# Patient Record
Sex: FEMALE | Race: WHITE
[De-identification: names, ages, dates, MRNs, and addresses within clinical notes are randomized per-mention and may not be internally consistent; named-entity substitution may affect disease eponyms.]

---

## 2017-01-11 ENCOUNTER — HOSPITAL ENCOUNTER (OUTPATIENT)
Dept: HOSPITAL 84 - D.FANS | Age: 57
Discharge: HOME | End: 2017-01-11
Attending: SURGERY
Payer: MEDICARE

## 2017-01-11 VITALS — BODY MASS INDEX: 45.7 KG/M2

## 2017-01-11 DIAGNOSIS — Z01.818: Primary | ICD-10-CM

## 2017-01-11 NOTE — NUR
Nutrition education for bariatric surgery (gastric bypass):
S: Pt unable to walk to office for education; requested a wheelchair.  Pt
states her blood sugar was low and asked me to buy her a coke.  Pt did provide
the money.  Pt reports she drinks regular Sprite, some water and SF drinks.
Pt states she skips meals and does not eat very much when she does eat.
Pt states she is unable to exercise due to leg pain.
 
O:  Dx:  morbid obesity, DMT2
    Ht: 5'8"   Wt: 308#   IBW: 140# +/-10%   BMI: 46.8
    PMH:  DMT2, DJD with knee replacement
 
A: Pt in denial re: how much soda she is drinking in a day;  Pt is also in
denial re: how much food she is consuming daily.  Pt food recall for last 24
hours reveals pt is only eating ~1200 kcal/day; however, pt reports she
continues to gain wt.  RDLOIS was standing behind pt in the dining room and she
ordered Lasagna, mashed potatoes, fried zucchini, gravy, large Dr. Pepper for
lunch.  Pt did not see me behind her.  Pt did not even attempt to practice and
of the homework she had just learned to prepare herself for bariatric surgery.
 
P: Reviewed pre/post op dietary control; clear liquid/full
liquid/puree/soft/regular diet progression and reviewed sample menus;
discussed no liquids with meals; 1/2 cup meal size; dumping syndrome; no
straws or carbonated drinks; protein requirements; clear liquid protein drinks
and protein powder, premixed protein supplements; reviewed sample menus; daily
multivitamin; pouch stretching and provided pt with printed diet information
and RDN name and phone number.
OSWALDON feels pt is not ready to make the necessary changes to be successful for
the long-term.
RDN will be available if needed.
Thank you for the consult.

## 2017-03-22 ENCOUNTER — HOSPITAL ENCOUNTER (OUTPATIENT)
Dept: HOSPITAL 84 - D.RT | Age: 57
Discharge: HOME | End: 2017-03-22
Attending: INTERNAL MEDICINE
Payer: MEDICARE

## 2017-03-22 VITALS — BODY MASS INDEX: 45.7 KG/M2

## 2017-03-22 DIAGNOSIS — R06.09: Primary | ICD-10-CM

## 2017-10-24 ENCOUNTER — HOSPITAL ENCOUNTER (OUTPATIENT)
Dept: HOSPITAL 84 - D.CATH | Age: 57
Discharge: HOME | End: 2017-10-24
Attending: INTERNAL MEDICINE
Payer: MEDICARE

## 2017-10-24 VITALS — DIASTOLIC BLOOD PRESSURE: 54 MMHG | SYSTOLIC BLOOD PRESSURE: 130 MMHG

## 2017-10-24 VITALS — BODY MASS INDEX: 47.5 KG/M2

## 2017-10-24 DIAGNOSIS — I10: ICD-10-CM

## 2017-10-24 DIAGNOSIS — E78.5: ICD-10-CM

## 2017-10-24 DIAGNOSIS — Z01.812: ICD-10-CM

## 2017-10-24 DIAGNOSIS — I25.119: Primary | ICD-10-CM

## 2017-10-24 LAB
ANION GAP SERPL CALC-SCNC: 14.5 MMOL/L (ref 8–16)
BASOPHILS NFR BLD AUTO: 0.4 % (ref 0–2)
BUN SERPL-MCNC: 34 MG/DL (ref 7–18)
CALCIUM SERPL-MCNC: 8.9 MG/DL (ref 8.5–10.1)
CHLORIDE SERPL-SCNC: 99 MMOL/L (ref 98–107)
CO2 SERPL-SCNC: 26.8 MMOL/L (ref 21–32)
CREAT SERPL-MCNC: 1.5 MG/DL (ref 0.6–1.3)
EOSINOPHIL NFR BLD: 5.8 % (ref 0–7)
ERYTHROCYTE [DISTWIDTH] IN BLOOD BY AUTOMATED COUNT: 13.1 % (ref 11.5–14.5)
GLUCOSE SERPL-MCNC: 316 MG/DL (ref 74–106)
HCT VFR BLD CALC: 35 % (ref 36–48)
HGB BLD-MCNC: 11.8 G/DL (ref 12–16)
IMM GRANULOCYTES NFR BLD: 0.4 % (ref 0–5)
LYMPHOCYTES NFR BLD AUTO: 24.6 % (ref 15–50)
MCH RBC QN AUTO: 32.7 PG (ref 26–34)
MCHC RBC AUTO-ENTMCNC: 33.7 G/DL (ref 31–37)
MCV RBC: 97 FL (ref 80–100)
MONOCYTES NFR BLD: 8.3 % (ref 2–11)
NEUTROPHILS NFR BLD AUTO: 60.5 % (ref 40–80)
OSMOLALITY SERPL CALC.SUM OF ELEC: 291 MOSM/KG (ref 275–300)
PLATELET # BLD: 222 10X3/UL (ref 130–400)
PMV BLD AUTO: 9.3 FL (ref 7.4–10.4)
POTASSIUM SERPL-SCNC: 4.3 MMOL/L (ref 3.5–5.1)
RBC # BLD AUTO: 3.61 10X6/UL (ref 4–5.4)
SODIUM SERPL-SCNC: 136 MMOL/L (ref 136–145)
WBC # BLD AUTO: 8 10X3/UL (ref 4.8–10.8)

## 2017-10-24 PROCEDURE — 93458 L HRT ARTERY/VENTRICLE ANGIO: CPT

## 2017-10-24 NOTE — NUR
2L NC, NO RESP DISTRESS. RIGHT WRIST TR BAND CDI, NO BLEEDING OR
HEMATOMA NOTED. NO C/O CHEST PAIN OR NAUSEA. VSS. FAMILY AT BEDSIDE,
CALL LIGHT WITHIN REACH.

## 2017-10-24 NOTE — NUR
LEFT HAND PIV D/C'D WITH CATHETER INTACT, BAND AID TO SITE. 3CC OF
AIR REMOVED FROM TR BAND, NO BLEEDING NOTED. UP TO BEDSIDE TO GET
DRESSED.

## 2017-10-24 NOTE — HEMODYNAMI
PATIENT:MIREYA BENOIT                               MEDICAL RECORD: [id number]
: 64                                            LOCATION:D.CAT          
ACCT# [id number]                                       ADMISSION DATE: 10/24/17
 
 
 Generatedon:10/24/660624:17
Patient name: MIREYA BENOIT Patient #: [id number] Visit #: [id number] SSN: [id number]
- :
1960 Date of study: 10/24/2017
Page: Of
Hemodynamic Procedure Report
****************************
Patient Data
Patient Demographics
Procedure consent was obtained
First Name: MIREYA          Gender: Female
Last Name: KAYCEE           : 1960
Middle Initial: ASHOK        Age: 57 year(s)
Patient #: [id number]       Race: 
Visit #: [id number]
SSN: 
Accession #:
[id number]
Additional ID: [id number]
Contact details
Address: [address] [place] [address]       Phone: [phone]
State: [place]
City: [place]
Zip code: [id number]
Past Medical History
Allergies
Allergen        Reaction        Date         Comments
Reported
Codeine                         2016
Other allergy                   2016    TYLENOL
Admission
Admission Data
Admission Date: 10/24/2017  Admission Time: 9:31
Arrival Date: 10/24/2017    Arrival Time: 11:30
Admit Source: Other         Insurance Payor: Medicare
Height (in.): 68            BSA: 2.47 (m2)
Height (cm.): 172.72        BMI: 47.44 (kg/m2)
Weight (lbs.): 312
Weight (kg.): 141.52
Lab Results
Lab Result Date: 10/24/2017 Lab Result Time: 0:00
Biochemistry
Name         Units    Result                Min      Max
BUN          mg/dl    34       --(----)-*   7        18
Creatinine   mg/dl    1.5      --(----)-*   0.6      1.3
CBC
Name         Units    Result                Min      Max
Hemoglobin   g/dl     11.8     *-(----)--   13.5     17.5
Procedure
Procedure Types
Cath Procedure
Diagnostic Procedure
LHC
LHC w/Coronaries
 
PCI Procedure
Coronary Stent Initial
Miscellaneous Procedures
Moderate Sedation up to 15 minutes
Procedure Description
Procedure Date
Procedure Date: 10/24/2017
Procedure Start Time: 13:01
Procedure End Time: 13:15
Procedure Staff
Name                            Function
Nakul Horowitz MD                Performing Physician
Kelly Walls RT               Scrub
Giovani Lewis RN                Nurse
Kelly Pearson RT                    Monitor
Procedure Data
Cath Procedure
Fluoroscopy
Diagnostic fluoroscopy      Total fluoroscopy Time: 2.9
time: 2.9 min               min
Diagnostic fluoroscopy      Total fluoroscopy dose:
dose: 1161 mGy              1161 mGy
Contrast Material
Contrast Material Type                       Amount (ml)
Isovue 300                                   139
Entry Location
Entry     Primary  Successful  Side  Size  Upsize Upsize Entry    Closure     Henao
ccessful  Closure
Location                             (Fr)  1 (Fr) 2 (Fr) Remarks  Device        
          Remarks
Radial                         Right 6 Fr                         Mechanical
artery                               Short                        Compression
Estimated blood loss: 5 ml
Diagnostic catheters
Device Type               Used For           End Catheter
Placement
Diagnostic Terumo 5Fr     Multi-vessel
Tiger 110cm catheter      Angiography
Procedure Complications
No complications
Procedure Medications
Medication           Administration Route Dosage
Oxygen               NC                   2 l/min
Heparin Flush Bag    added to field       2 bags
(1000units/500ml NS)
0.9% NaCl            I.V.                 100 ml/hr
Radial Cocktail      added to field       1 syringe
(Verapomil 2mg/Nitro
400mcg/Heparin
1500units)
Fentanyl             I.V.                 50 mcg
Versed               I.V.                 1 mg
Fentanyl             I.V.                 50 mcg
Versed               I.V.                 1 mg
Fentanyl             I.V.                 50 mcg
Radial Cocktail      I.A.                 1 syringe
(Verapomil 2mg/Nitro
400mcg/Heparin
1500units)
Heparin Bolus        I.V.                 4000 units
 
Hemodynamics
Rest
BSA: 2.47 (m2) HGB: 11.8 (g/dl) O2 Consumption: Estimated: 240.03 (ml/min) O2 Co
nsumption indexed:
Estimated:97.18 (ml/min/m) Heart Rate: 72 (bpm)
Pressure Samples
Time     Site     Value (mmHg) Purpose      Heart      Use
Rate(bpm)
13:03    LV       75/2,17      Snapshot     52
Snapshots
Pre Cath      Intra         NCS           Post Cath
Vital Signs
Time     Heart  Resp   SPO2 etCO2   NIBP (mmHg) Rhythm  Pain    Sedation
Rate   (ipm)  (%)  (mmHg)                      Status  Level
(bpm)
12:44:00 71     18     97   0       128/68(93)  NSR     0 (11)  10(A)
, No
pain
12:48:40 70     19     97   46.6    129/68(107) NSR     0 (11)  10(A)
, No
pain
12:53:21 69     18     95   39.1    122/67(90)  NSR     0 (11)  10(A)
, No
pain
12:58:06 71     18     94   40.6    112/54(80)  NSR     0 (11)  10(A)
, No
pain
13:02:38 71     18     94   42.1    114/66(91)  NSR     0 (11)  10(A)
, No
pain
13:07:16 70     18     95   30.8    107/59(77)  NSR     0 (11)  10(A)
, No
pain
13:12:03 80     18     96   22.5    121/42(71)  NSR     0 (11)  10(A)
, No
pain
13:16:58 70     18     95   33.8    111/48(80)  NSR     0 (11)  10(A)
, No
pain
Medications
Time     Medication       Route  Dose    Verified Delivered Reason          Note
s  Effectiveness
by       by
12:47:24 Oxygen           NC     2 l/min Nakul Matute    Per physician
Lor Lewis RN
12:47:33 Heparin Flush    added  2 bags  Nakul Matute    used for
Bag              to             Lor Lewis RN procedure
(1000units/500ml field
NS)
12:47:44 0.9% NaCl        I.V.   100     Nakul Matute    Per physician
ml/hr   Lor Lewis RN
12:47:51 Radial Cocktail  added  1       Nakul Matute    used for
(Verapomil       to     syringe Lor Lewis RN procedure
2mg/Nitro        field
400mcg/Heparin
1500units)
12:55:19 Fentanyl         I.V.   50 mcg  Nakul Matute    for sedation
Lor Lewis RN
12:55:25 Versed           I.V.   1 mg    Nakul Matute    for sedation
Lor Lewis RN
 
12:56:53 Fentanyl         I.V.   50 mcg  Nakul Matute    for sedation
Lor Lewis RN
12:56:59 Versed           I.V.   1 mg    Nakul Matute    for sedation
Lor Lewis RN
13:03:01 Fentanyl         I.V.   50 mcg  Nakul Crumy    for sedation
Lor Lewis RN
13:03:14 Radial Cocktail  I.A.   1       Nakul Mota   for
(Verapomil              syringe Lor Horowitz MD  vasodilation
2mg/Nitro
400mcg/Heparin
1500units)
13:07:43 Heparin Bolus    I.V.   4000    Nakul Matute    for
units   Lor Lewis RN anticoagulation
Procedure Log
Time     Note
12:25:00 Giovani Lewis RN sent for patient. Start room use.
12:31:18 Informed consent obtained and on chart
12::31 Diagnostic Cath Status : Elective
12:32:01 Time tracking: Regular hours
12:32:06 Plan of Care:Hemodynamics will remain stable., Cardiac
rhythm will remain stable., Comfort level will be
maintained., Respiratory function will remain
adequate., Patient/ family verbilizes understanding of
procedure., Procedure tolerated without complication.,
Recovers from procedure without complications..
12:37:32 Patient received from Pre/Post Procedure Room to [facility] 2
Alert and oriented. Tansferred to table in Supine
position.
12:37:33 Warm blankets applied, and anne marie hugger turned on for
patient comfort.
12:37:34 Correct patient and procedure confirmed by team.
12:37:35 ECG and BP/O2 sat monitors applied to patient.
12:42:17 Vital chart was started
12:47:24 Oxygen 2 l/min NC was administered by Giovani Lewis
RN; Per physician;
12:47:33 Heparin Flush Bag (1000units/500ml NS) 2 bags added to
field was administered by Giovani Lewis RN; used for
procedure;
12:47:33 Baseline sample Acquired.
12:47:36 Rhythm: sinus rhythm
12:47:38 Full Disclosure recording started
12:47:43 H&P Date Dictated: 10/24/2017 Within 30 days and on
chart., H&P Addendum completed by physician on day of
procedure. (MUST COMPLETE FOR ALL OUTPATIENTS).
12:47:44 0.9% NaCl 100 ml/hr I.V. was administered by Giovani Lewis RN; Per physician;
12:47:45 Pre-procedure instructions explained to patient.
12:47:45 Pre-op teaching completed and patient verbalized
understanding.
12:47:46 Family in waiting room.
12:47:48 Patient NPO since Midnight.
12:47:51 Radial Cocktail (Verapomil 2mg/Nitro 400mcg/Heparin
1500units) 1 syringe added to field was administered
by Giovani Lewis RN; used for procedure;
12:47:51 Is the patient allergic to Iodine/contrast media? No.
12:47:52 Was the patient premedicated? No
12:47:55 Is patient on blood thinner?Yes
12:48:04 **ACC** The patient was administered the following
blood thiners within the last 24 hours: **ACC**Plavix
12:48:25 Patient diabetic? Yes.
 
12:48:26 If diabetic: On Metformin? No
12:48:28 Previous problem with sedation/anesthesia? No ?
12:48:30 Snore? Yes
12:48:31 Sleep apnea? Yes
12:48:31 Deviated septum? No
12:48:32 Opens mouth fully? Yes
12:48:33 Sticks out tongue? Yes
12:48:52 Airway obstruction? No ?
12:48:56 Dentures? No ?
12:48:59 Pre procedure: right dorsailis pedis pulse 1+
Palpable, but thready & weak; easily obliterated
12:49:02 Pre procedure: left dorsailis pedis pulse 1+ Palpable,
but thready & weak; easily obliterated
12:49:08 Patient pain scale 0/10 ?.
12:49:22 IV patent on arrival in left forearm with 0.9% NaCl at
[facility].
12:50:03 Lab Result : BUN 34 mg/dl
12:50:03 Lab Result : Creatinine 1.5 mg/dl
12:50:03 Lab Result : Hemoglobin 11.8 g/dl
12:50:06 Lab results completed and on chart.
12:50:10 Right Radial & Right Groin area was prepped with
chlora-prep and draped in sterile fashion
12:50:12 Alarms reviewed by R. N.
12:50:12 Sharps counted by scrub and verified by R.N.
12:50:28 Physician arrived
12:50:29 --------ALL STOP TIME OUT------
12:50:29 Final Timeout: patient, procedure, and site verified
with staff and physician. All members of the team are
in agreement.
12:50:32 Right Radial & Right Groin site verified by team.
12:50:34 Physical assessment completed. ASA score P 2 - A
patient with mild systemic disease as per Nakul Horowitz MD.
12:50:37 Sedation plan: IV Moderate Sedation Versed, Fentanyl
12:50:45 Use device set Radial Dx
12:50:46 Acist Syringe opened to sterile field.
12:50:46 Medline Cath Pack opened to sterile field.
12:50:46 Bag Decanter opened to sterile field.
12:50:47 Terumo 6Fr Slender Glidesheath opened to sterile
field.
12:50:47 St Guillermo 260cm J .035 wire opened to sterile field.
12:50:48 Acist Hand Control opened to sterile field.
12:50:48 Acist Manifold opened to sterile field.
12:50:48 Tegaderm 4 x 4 opened to sterile field.
12:50:49 MBrace Wrist Support opened to sterile field.
12:54:24 Admit Source: Other
12:54:30 Arrival Date: 10/24/2017 11:30:00 AM
12:54:36 Insurance Payor : Medicare
12:54:41 Patient Height : 68 inches
12:54:46 Patient Weight : 312 lbs
12:55:19 Fentanyl 50 mcg I.V. was administered by Giovani Lewis RN; for sedation;
12:55:25 Versed 1 mg I.V. was administered by Giovani Lewis RN;
for sedation;
12:56:06 Zero performed for pressure channel P1
12:56:14 Zero performed for pressure channel P1
12:56:53 Fentanyl 50 mcg I.V. was administered by Giovani Lewis RN; for sedation;
12:56:59 Versed 1 mg I.V. was administered by Giovani Lewis RN;
for sedation;
 
13:00:58 Procedure started.
13:01:04 Local anesthetic to right radial artery with Lidocaine
2% by Nakul Horowitz MD.**INITIAL ACCESS ONLY**
13:02:02 A 6 Fr Short sheath was inserted into the Right Radial
artery
13:02:22 A Diagnostic Terumo 5Fr [facility] 110cm catheter was
advanced over the wire and used for Multi-vessel
Angiography.
13:03:01 Fentanyl 50 mcg I.V. was administered by Giovani Lewis RN; for sedation;
13:03:14 Radial Cocktail (Verapomil 2mg/Nitro 400mcg/Heparin
1500units) 1 syringe I.A. was administered by Nakul Horowitz MD; for vasodilation;
13:03:50 LV hemodynamics recorded.
13:03:51 LV gram done using DELUCA
13:03:55 Injector settings: Ml/sec: 5, Volume: 15,
13:04:00 EF : 55 %
13:04:05 LCA angiography performed.
13:04:09 Injector settings: Ml/sec: 3, Volume: 6,
13:05:29 Liriano Whisper J 300cm 0.014 guide wire opened to
sterile field.
13:05:30 Merit BasixCompak Inflation Kit opened to sterile
field.
13:06:32 Cordis 6FR XBLAD 3.5 guide catheter opened to sterile
field.
13:06:43 RCA angiography performed.
13:06:45 Catheter removed.
13:06:56 6 Fr xblad 3.5 guide catheter was inserted over the
wire
13:07:43 Heparin Bolus 4000 units I.V. was administered by
Giovani Lewis RN; for anticoagulation;
13:08:11 whisper wire advanced.
13:11:00 Inflation Number: 1 A Lucerne RX 3.0 x 38 stent was
prepped and advanced across the Prox LAD. The stent
was deployed at 15 RAJIV for 0:10 (min:sec).
13:11:22 Stent catheter was removed intact over wire.
13:11:22 Wire removed.
13:11:23 Guide catheter removed.
13:12:08 Terumo TR Band Standard opened to sterile field.
13:13:19 Sheath removed intact; hemostasis achieved with
Mechanical Compression to the Right Radial artery.
13:13:32 Procedure ended.(Physican Out)
13:13:49 Fluoroscopy time 02.90 minutes.
13:13:53 Fluoroscopy dose: 1161 mGy
13:13:53 Flurop Dose total: 1161
13:14:11 Contrast amount:Isovue 300 139ml.
13:14:13 Sharps counted by scrub and verified by R.N.
13:14:15 TR band inflated with 10cc of air.
13:14:17 Insertion/operative site no bleeding no hematoma.
13:14:20 Post right radial artery:stable
13:14:21 Post Procedure Pulses reassessed and unchanged
13:14:24 Post procedure rhythm: unchanged.
13:14:27 Estimated blood loss: 5 ml
13:14:28 Post procedure instruction explained to
patient.Patient verbalizes understanding.
13:14:30 Patient needs reinforcement of post procedure
teaching.
13:14:46 Procedure type changed to Cath procedure, Diagnostic
procedure, LHC, LHC w/Coronaries, PCI procedure,
Coronary Stent Initial, Miscellaneous Procedures,
 
Moderate Sedation up to 15 minutes
13:14:47 Procedure and supply charges have been captured,
reviewed, submitted and are correct.
13:14:52 Procedure Complication : No complications
13:14:54 Vital chart was stopped
13:14:59 See physician's report for complete and final results.
13:15:01 Report given to Pre/Post Procedure Room.
13:15:04 Patient transfered to Pre/Post Procedure Room with
Stretcher.
13:15:06 Procedure ended.
13:15:06 Full Disclosure recording stopped
13:15:14 **ACC-PCI Only** Patient was given prescriptions, or
instructed by Nakul Horowitz MD to start/continue the
following medications upon discharge: Plavix
13:15:16 End room use (Document Last)
Intervention Summary
Intervention Notes
Time     ActionType  Lesion and  Equipment Action#  Pressure  Duration
Attributes  Used
13:11:00 Place stent Prox LAD    Andrea RX   1        15        00:10
3.0 x 38
stent
Device Usage
Item Name   Manufacture  Quantity  Catalog       Hospital Part     Current Minim
al Lot# /
Number        Charge   Number   Stock   Stock   Serial#
Code
Acist       Acist        1         23352         333650   727601   967811  20
Syringe     Medical
Systems Inc
Medline     Cardinal     1         [id number]     273430   33916    822101  5
Cath Pack   Health
Bag         Microtek     1         2002S         153327   56891    228350  5
Evver Inc.
Terumo 6Fr  Terumo       1         [id number]    301886   454289   390118  40
Slender
Glidesheath
St Guillermo     St Guillermo      1         933253        616926   922340   426406  30
260cm J
.035 wire
Acist Hand  Acist        1         05059         245605   771029   780805  5
Control     Medical
Systems Inc
Acist       Acist        1         39787         122919   313945   704841  5
Manifold    Medical
Systems Inc
Tegaderm 4  3M           1         1626W         334188   165781   323533  5
x 4
MBrace      Advanced     1         140-0250-00   922491   16163    823398  5
Wrist       Vascular
Support     Dynamics
Diagnostic  Terumo       1                253280   693215   304495  5
Terumo 5Fr
[facility] 110cm
catheter
Liriano      Abbott       1         [id number]     533879   502900   062300  5
Whisper J   Vascular
300cm 0.014
guide wire
Merit       Merit        1         [id number]        144122   008547   461080  15
 
adicate timeadspaKanshu Medical
Inflation
Kit
Cordis 6FR  Cardinal     1         [id number]      189002   193258   715224  10
XBLAD 3.5   Health
guide
catheter
Lucerne RX 3.0 Medtronic    1         [id number]  794068   2729804  344192  5    
   [id number]
x 38 stent
Terumo TR   Terumo       1         [id number]     049646   396242   922488  40
Band
Standard
Signature Audit [place]
Stage           Time        Signature      Unsigned
Intra-Procedure 10/24/2017  Kelly Pearson
1:17:34 PM  RT(R)
Signatures
Monitor : Kelly Pearson RT   Signature :
______________________________
Date : ______________ Time :
______________
 
 
 
 
 
 
 
 
 
 
 
 
 
 
 
 
 
 
 
 
 
 
 
 
 
 
 
 
 
 
 
 
 
[address] [place] [address]

## 2017-10-24 NOTE — NUR
SANDWICH TRAY AND DRINK GIVEN. NO C/O NAUSEA. RIGHT WRIST TR BAND
CDI, NO BLEEDING NOTED. 2L NC, NO RESP DISTRESS. VSS. WILL CONTINUE
TO MONITOR.

## 2017-10-24 NOTE — NUR
2L NC, NO RESP DISTRESS NOTED. RIGHT WRIST TR BAND CDI WITH NO
BLEEDING OR HEMATOMA. VSS. NO C/O NAUSEA OR PAIN. FAMILY AT BEDSIDE,
CALL LIGHT WITHIN REACH.

## 2017-10-24 NOTE — NUR
REMAINING AIR REMOVED FROM TR BAND, DRESSING TO SITE. DISCHARGE
INSTRUCTIONS GIVEN, VERBALIZED UNDERSTANDING.

## 2017-10-24 NOTE — NUR
RESTING QUIETLY WITH EYES CLOSED. RIGHT WRIST TR BAND CDI, NO
BLEEDING NOTED. 2L NC, NO RESP DISTRESS. NO C/O PAIN. VSS. CALL LIGHT
WITHIN REACH.

## 2017-10-27 NOTE — OP
PATIENT NAME:  MIREYA BENOIT                        MEDICAL RECORD: [id number]
:64                                             LOCATION:D.CAT          
                                                         ADMISSION DATE:        
SURGEON:  MUNA SANTOS MD             
 
 
DATE OF OPERATION:  10/24/2017
 
PROCEDURES:
1.  PTCA stent LAD.
2.  Left heart catheterization.
3.  Selective coronary angiography.
4.  Left ventriculogram.
 
INDICATION:  Chest pain compatible with angina and coronary artery disease.
 
PROCEDURE IN DETAIL:  After informed consent was obtained and after a detailed
explanation of risks, benefits as well as alternative therapies, the patient
elected to proceed with angiogram and angioplasty.  The right radial area is
prepped and draped in normal sterile fashion.  The right radial artery was
cannulated via modified Seldinger technique with placement of a 6-Montserratian sheath.
 All catheters exchanged through this sheath.
 
FINDINGS:  The left ventriculogram was performed in the standard 30-degree DELUCA
view reveals good cardiac wall motion throughout all segments.  Overall ejection
fraction estimated at 60%.
 
SELECTIVE CORONARY ANGIOGRAPHY:
1.  Left main showed no significant angiographic disease.
2.  Left anterior descending has a long area of 70% to 75% stenosis proximally,
then a previously placed stent.  This is widely patent and mild irregularities.
3.  Left circumflex shows moderate irregularities, but no flow-limiting
stenosis.
4.  Right coronary is small and diffusely diseased, nondominant.
 
PTCA STENT OF THE LAD:  The stent used is a 3.0 x 38 mm [place] taken to 15
atmospheres.  Result was 0% residual stenosis.
 
OVERALL IMPRESSION:  Successful percutaneous transluminal coronary angioplasty
stent of the left anterior descending going from a long area of 70% to 75%
initial stenosis to 0% residual stenosis.
 
TRANSINT:[id number] Voice Confirmation ID: [id number] DOCUMENT ID: [id number]
                                           
                                           MUNA SANTOS MD             
 
 
 
Electronically Signed by MUNA SANTOS on 10/27/17 at 1656
 
CC:                                                             [id number]
DICTATION DATE: 10/24/17 131     :     10/24/17 1351      [facility] CLI 
                                                                      10/24/17
[address] [address]

## 2017-12-01 ENCOUNTER — HOSPITAL ENCOUNTER (OUTPATIENT)
Dept: HOSPITAL 84 - D.MAMMO | Age: 57
Discharge: HOME | End: 2017-12-01
Attending: FAMILY MEDICINE
Payer: MEDICARE

## 2017-12-01 VITALS — BODY MASS INDEX: 47.5 KG/M2

## 2017-12-01 DIAGNOSIS — Z12.31: Primary | ICD-10-CM

## 2017-12-04 ENCOUNTER — HOSPITAL ENCOUNTER (EMERGENCY)
Dept: HOSPITAL 84 - D.ER | Age: 57
Discharge: HOME | End: 2017-12-04
Payer: MEDICARE

## 2017-12-04 VITALS — BODY MASS INDEX: 47.5 KG/M2
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
## 2017-12-04 DIAGNOSIS — S80.02XA: Primary | ICD-10-CM

## 2017-12-04 DIAGNOSIS — S70.02XA: ICD-10-CM
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
## 2017-12-04 DIAGNOSIS — Z79.4: ICD-10-CM

## 2017-12-04 DIAGNOSIS — E11.9: ICD-10-CM

## 2017-12-27 ENCOUNTER — HOSPITAL ENCOUNTER (INPATIENT)
Dept: HOSPITAL 84 - D.MS | Age: 57
LOS: 9 days | Discharge: HOME HEALTH | DRG: 629 | End: 2018-01-05
Attending: FAMILY MEDICINE | Admitting: FAMILY MEDICINE
Payer: MEDICARE

## 2017-12-27 VITALS
BODY MASS INDEX: 44.41 KG/M2 | HEIGHT: 68 IN | HEIGHT: 68 IN | WEIGHT: 293 LBS | BODY MASS INDEX: 44.41 KG/M2 | WEIGHT: 293 LBS | BODY MASS INDEX: 44.41 KG/M2 | BODY MASS INDEX: 44.41 KG/M2

## 2017-12-27 DIAGNOSIS — E88.81: ICD-10-CM

## 2017-12-27 DIAGNOSIS — B95.61: ICD-10-CM

## 2017-12-27 DIAGNOSIS — E87.5: ICD-10-CM

## 2017-12-27 DIAGNOSIS — E66.01: ICD-10-CM

## 2017-12-27 DIAGNOSIS — L03.012: ICD-10-CM

## 2017-12-27 DIAGNOSIS — E11.69: Primary | ICD-10-CM

## 2017-12-27 DIAGNOSIS — M86.9: ICD-10-CM

## 2017-12-27 DIAGNOSIS — E11.40: ICD-10-CM

## 2017-12-27 DIAGNOSIS — M65.18: ICD-10-CM

## 2017-12-27 DIAGNOSIS — N17.9: ICD-10-CM

## 2017-12-27 LAB
ANION GAP SERPL CALC-SCNC: 14.7 MMOL/L (ref 8–16)
BASOPHILS NFR BLD AUTO: 0.2 % (ref 0–2)
BUN SERPL-MCNC: 38 MG/DL (ref 7–18)
CALCIUM SERPL-MCNC: 9.2 MG/DL (ref 8.5–10.1)
CHLORIDE SERPL-SCNC: 93 MMOL/L (ref 98–107)
CO2 SERPL-SCNC: 26.6 MMOL/L (ref 21–32)
CREAT SERPL-MCNC: 1.6 MG/DL (ref 0.6–1.3)
EOSINOPHIL NFR BLD: 1.4 % (ref 0–7)
ERYTHROCYTE [DISTWIDTH] IN BLOOD BY AUTOMATED COUNT: 12.8 % (ref 11.5–14.5)
ERYTHROCYTE [SEDIMENTATION RATE] IN BLOOD: 15 MM/HR (ref 0–30)
GLUCOSE SERPL-MCNC: 306 MG/DL (ref 74–106)
HCT VFR BLD CALC: 32.4 % (ref 36–48)
HGB BLD-MCNC: 10.7 G/DL (ref 12–16)
IMM GRANULOCYTES NFR BLD: 0.3 % (ref 0–5)
LYMPHOCYTES NFR BLD AUTO: 10.6 % (ref 15–50)
MCH RBC QN AUTO: 31.8 PG (ref 26–34)
MCHC RBC AUTO-ENTMCNC: 33 G/DL (ref 31–37)
MCV RBC: 96.1 FL (ref 80–100)
MONOCYTES NFR BLD: 9.1 % (ref 2–11)
NEUTROPHILS NFR BLD AUTO: 78.4 % (ref 40–80)
OSMOLALITY SERPL CALC.SUM OF ELEC: 279 MOSM/KG (ref 275–300)
PLATELET # BLD: 295 10X3/UL (ref 130–400)
PMV BLD AUTO: 9.2 FL (ref 7.4–10.4)
POTASSIUM SERPL-SCNC: 5.3 MMOL/L (ref 3.5–5.1)
RBC # BLD AUTO: 3.37 10X6/UL (ref 4–5.4)
SODIUM SERPL-SCNC: 129 MMOL/L (ref 136–145)
WBC # BLD AUTO: 13.2 10X3/UL (ref 4.8–10.8)

## 2017-12-28 VITALS — DIASTOLIC BLOOD PRESSURE: 51 MMHG | SYSTOLIC BLOOD PRESSURE: 125 MMHG

## 2017-12-28 VITALS — DIASTOLIC BLOOD PRESSURE: 64 MMHG | SYSTOLIC BLOOD PRESSURE: 137 MMHG

## 2017-12-28 VITALS — DIASTOLIC BLOOD PRESSURE: 39 MMHG | SYSTOLIC BLOOD PRESSURE: 112 MMHG

## 2017-12-28 VITALS — SYSTOLIC BLOOD PRESSURE: 120 MMHG | DIASTOLIC BLOOD PRESSURE: 64 MMHG

## 2017-12-28 VITALS — DIASTOLIC BLOOD PRESSURE: 50 MMHG | SYSTOLIC BLOOD PRESSURE: 102 MMHG

## 2017-12-28 LAB
ANION GAP SERPL CALC-SCNC: 13.1 MMOL/L (ref 8–16)
BUN SERPL-MCNC: 37 MG/DL (ref 7–18)
CALCIUM SERPL-MCNC: 8.6 MG/DL (ref 8.5–10.1)
CHLORIDE SERPL-SCNC: 97 MMOL/L (ref 98–107)
CO2 SERPL-SCNC: 26.5 MMOL/L (ref 21–32)
CREAT SERPL-MCNC: 1.4 MG/DL (ref 0.6–1.3)
GLUCOSE SERPL-MCNC: 248 MG/DL (ref 74–106)
OSMOLALITY SERPL CALC.SUM OF ELEC: 281 MOSM/KG (ref 275–300)
POTASSIUM SERPL-SCNC: 4.6 MMOL/L (ref 3.5–5.1)
SODIUM SERPL-SCNC: 132 MMOL/L (ref 136–145)

## 2017-12-28 PROCEDURE — 0PBV0ZZ EXCISION OF LEFT FINGER PHALANX, OPEN APPROACH: ICD-10-PCS | Performed by: ORTHOPAEDIC SURGERY

## 2017-12-29 VITALS — SYSTOLIC BLOOD PRESSURE: 110 MMHG | DIASTOLIC BLOOD PRESSURE: 60 MMHG

## 2017-12-29 VITALS — SYSTOLIC BLOOD PRESSURE: 134 MMHG | DIASTOLIC BLOOD PRESSURE: 71 MMHG

## 2017-12-29 VITALS — SYSTOLIC BLOOD PRESSURE: 106 MMHG | DIASTOLIC BLOOD PRESSURE: 55 MMHG

## 2017-12-29 VITALS — SYSTOLIC BLOOD PRESSURE: 159 MMHG | DIASTOLIC BLOOD PRESSURE: 69 MMHG

## 2017-12-29 LAB
ANION GAP SERPL CALC-SCNC: 12 MMOL/L (ref 8–16)
BUN SERPL-MCNC: 27 MG/DL (ref 7–18)
CALCIUM SERPL-MCNC: 8.7 MG/DL (ref 8.5–10.1)
CHLORIDE SERPL-SCNC: 101 MMOL/L (ref 98–107)
CO2 SERPL-SCNC: 26.4 MMOL/L (ref 21–32)
CREAT SERPL-MCNC: 1.3 MG/DL (ref 0.6–1.3)
ERYTHROCYTE [DISTWIDTH] IN BLOOD BY AUTOMATED COUNT: 13 % (ref 11.5–14.5)
GLUCOSE SERPL-MCNC: 165 MG/DL (ref 74–106)
HCT VFR BLD CALC: 29.1 % (ref 36–48)
HGB BLD-MCNC: 9.5 G/DL (ref 12–16)
MCH RBC QN AUTO: 31.4 PG (ref 26–34)
MCHC RBC AUTO-ENTMCNC: 32.6 G/DL (ref 31–37)
MCV RBC: 96 FL (ref 80–100)
OSMOLALITY SERPL CALC.SUM OF ELEC: 278 MOSM/KG (ref 275–300)
PLATELET # BLD: 287 10X3/UL (ref 130–400)
PMV BLD AUTO: 8.8 FL (ref 7.4–10.4)
POTASSIUM SERPL-SCNC: 4.4 MMOL/L (ref 3.5–5.1)
RBC # BLD AUTO: 3.03 10X6/UL (ref 4–5.4)
SODIUM SERPL-SCNC: 135 MMOL/L (ref 136–145)
WBC # BLD AUTO: 10.2 10X3/UL (ref 4.8–10.8)

## 2017-12-30 VITALS — DIASTOLIC BLOOD PRESSURE: 80 MMHG | SYSTOLIC BLOOD PRESSURE: 169 MMHG

## 2017-12-30 VITALS — SYSTOLIC BLOOD PRESSURE: 135 MMHG | DIASTOLIC BLOOD PRESSURE: 67 MMHG

## 2017-12-30 VITALS — DIASTOLIC BLOOD PRESSURE: 62 MMHG | SYSTOLIC BLOOD PRESSURE: 142 MMHG

## 2017-12-30 VITALS — SYSTOLIC BLOOD PRESSURE: 134 MMHG | DIASTOLIC BLOOD PRESSURE: 69 MMHG

## 2017-12-30 LAB
ANION GAP SERPL CALC-SCNC: 13.8 MMOL/L (ref 8–16)
APPEARANCE UR: CLEAR
BACTERIA #/AREA URNS HPF: (no result) /HPF
BILIRUB SERPL-MCNC: NEGATIVE MG/DL
BUN SERPL-MCNC: 22 MG/DL (ref 7–18)
CALCIUM SERPL-MCNC: 8.8 MG/DL (ref 8.5–10.1)
CHLORIDE SERPL-SCNC: 102 MMOL/L (ref 98–107)
CO2 SERPL-SCNC: 26.7 MMOL/L (ref 21–32)
COLOR UR: (no result)
CREAT SERPL-MCNC: 1.1 MG/DL (ref 0.6–1.3)
ERYTHROCYTE [DISTWIDTH] IN BLOOD BY AUTOMATED COUNT: 12.9 % (ref 11.5–14.5)
GLUCOSE SERPL-MCNC: 184 MG/DL (ref 74–106)
GLUCOSE SERPL-MCNC: 250 MG/DL
HCT VFR BLD CALC: 30.7 % (ref 36–48)
HGB BLD-MCNC: 10 G/DL (ref 12–16)
KETONES UR STRIP-MCNC: NEGATIVE MG/DL
MCH RBC QN AUTO: 31.3 PG (ref 26–34)
MCHC RBC AUTO-ENTMCNC: 32.6 G/DL (ref 31–37)
MCV RBC: 96.2 FL (ref 80–100)
NITRITE UR-MCNC: NEGATIVE MG/ML
OSMOLALITY SERPL CALC.SUM OF ELEC: 283 MOSM/KG (ref 275–300)
PH UR STRIP: 5 [PH] (ref 5–6)
PLATELET # BLD: 301 10X3/UL (ref 130–400)
PMV BLD AUTO: 9 FL (ref 7.4–10.4)
POTASSIUM SERPL-SCNC: 4.5 MMOL/L (ref 3.5–5.1)
PROT UR-MCNC: NEGATIVE MG/DL
RBC # BLD AUTO: 3.19 10X6/UL (ref 4–5.4)
RBC #/AREA URNS HPF: (no result) /HPF (ref 0–5)
SODIUM SERPL-SCNC: 138 MMOL/L (ref 136–145)
SP GR UR STRIP: 1.01 (ref 1–1.02)
SQUAMOUS #/AREA URNS HPF: (no result) /HPF (ref 0–5)
UROBILINOGEN UR-MCNC: NORMAL MG/DL
WBC # BLD AUTO: 8.5 10X3/UL (ref 4.8–10.8)
WBC #/AREA URNS HPF: (no result) /HPF (ref 0–5)

## 2017-12-31 VITALS — SYSTOLIC BLOOD PRESSURE: 157 MMHG | DIASTOLIC BLOOD PRESSURE: 74 MMHG

## 2017-12-31 VITALS — SYSTOLIC BLOOD PRESSURE: 134 MMHG | DIASTOLIC BLOOD PRESSURE: 64 MMHG

## 2017-12-31 VITALS — DIASTOLIC BLOOD PRESSURE: 77 MMHG | SYSTOLIC BLOOD PRESSURE: 173 MMHG

## 2017-12-31 VITALS — DIASTOLIC BLOOD PRESSURE: 88 MMHG | SYSTOLIC BLOOD PRESSURE: 189 MMHG

## 2017-12-31 VITALS — SYSTOLIC BLOOD PRESSURE: 152 MMHG | DIASTOLIC BLOOD PRESSURE: 63 MMHG

## 2018-01-01 VITALS — SYSTOLIC BLOOD PRESSURE: 159 MMHG | DIASTOLIC BLOOD PRESSURE: 61 MMHG

## 2018-01-01 VITALS — SYSTOLIC BLOOD PRESSURE: 176 MMHG | DIASTOLIC BLOOD PRESSURE: 73 MMHG

## 2018-01-01 VITALS — DIASTOLIC BLOOD PRESSURE: 81 MMHG | SYSTOLIC BLOOD PRESSURE: 177 MMHG

## 2018-01-01 VITALS — DIASTOLIC BLOOD PRESSURE: 69 MMHG | SYSTOLIC BLOOD PRESSURE: 159 MMHG

## 2018-01-01 VITALS — DIASTOLIC BLOOD PRESSURE: 70 MMHG | SYSTOLIC BLOOD PRESSURE: 159 MMHG

## 2018-01-01 LAB
ANION GAP SERPL CALC-SCNC: 12.3 MMOL/L (ref 8–16)
BASOPHILS NFR BLD AUTO: 0.1 % (ref 0–2)
BUN SERPL-MCNC: 14 MG/DL (ref 7–18)
CALCIUM SERPL-MCNC: 8.8 MG/DL (ref 8.5–10.1)
CHLORIDE SERPL-SCNC: 104 MMOL/L (ref 98–107)
CO2 SERPL-SCNC: 26.6 MMOL/L (ref 21–32)
CREAT SERPL-MCNC: 0.9 MG/DL (ref 0.6–1.3)
EOSINOPHIL NFR BLD: 3.2 % (ref 0–7)
ERYTHROCYTE [DISTWIDTH] IN BLOOD BY AUTOMATED COUNT: 12.9 % (ref 11.5–14.5)
GLUCOSE SERPL-MCNC: 114 MG/DL (ref 74–106)
HCT VFR BLD CALC: 29.1 % (ref 36–48)
HGB BLD-MCNC: 9.5 G/DL (ref 12–16)
IMM GRANULOCYTES NFR BLD: 0.5 % (ref 0–5)
LYMPHOCYTES NFR BLD AUTO: 22.5 % (ref 15–50)
MCH RBC QN AUTO: 31.3 PG (ref 26–34)
MCHC RBC AUTO-ENTMCNC: 32.6 G/DL (ref 31–37)
MCV RBC: 95.7 FL (ref 80–100)
MONOCYTES NFR BLD: 8.1 % (ref 2–11)
NEUTROPHILS NFR BLD AUTO: 65.6 % (ref 40–80)
OSMOLALITY SERPL CALC.SUM OF ELEC: 279 MOSM/KG (ref 275–300)
PLATELET # BLD: 313 10X3/UL (ref 130–400)
PMV BLD AUTO: 8.7 FL (ref 7.4–10.4)
POTASSIUM SERPL-SCNC: 3.9 MMOL/L (ref 3.5–5.1)
RBC # BLD AUTO: 3.04 10X6/UL (ref 4–5.4)
SODIUM SERPL-SCNC: 139 MMOL/L (ref 136–145)
WBC # BLD AUTO: 8.2 10X3/UL (ref 4.8–10.8)

## 2018-01-02 VITALS — SYSTOLIC BLOOD PRESSURE: 195 MMHG | DIASTOLIC BLOOD PRESSURE: 71 MMHG

## 2018-01-02 VITALS — DIASTOLIC BLOOD PRESSURE: 70 MMHG | SYSTOLIC BLOOD PRESSURE: 174 MMHG

## 2018-01-02 VITALS — DIASTOLIC BLOOD PRESSURE: 76 MMHG | SYSTOLIC BLOOD PRESSURE: 175 MMHG

## 2018-01-02 VITALS — SYSTOLIC BLOOD PRESSURE: 166 MMHG | DIASTOLIC BLOOD PRESSURE: 70 MMHG

## 2018-01-02 VITALS — DIASTOLIC BLOOD PRESSURE: 81 MMHG | SYSTOLIC BLOOD PRESSURE: 169 MMHG

## 2018-01-03 VITALS — DIASTOLIC BLOOD PRESSURE: 79 MMHG | SYSTOLIC BLOOD PRESSURE: 148 MMHG

## 2018-01-03 VITALS — DIASTOLIC BLOOD PRESSURE: 77 MMHG | SYSTOLIC BLOOD PRESSURE: 181 MMHG

## 2018-01-03 VITALS — DIASTOLIC BLOOD PRESSURE: 99 MMHG | SYSTOLIC BLOOD PRESSURE: 189 MMHG

## 2018-01-03 VITALS — DIASTOLIC BLOOD PRESSURE: 82 MMHG | SYSTOLIC BLOOD PRESSURE: 155 MMHG

## 2018-01-03 VITALS — SYSTOLIC BLOOD PRESSURE: 159 MMHG | DIASTOLIC BLOOD PRESSURE: 77 MMHG

## 2018-01-03 VITALS — DIASTOLIC BLOOD PRESSURE: 73 MMHG | SYSTOLIC BLOOD PRESSURE: 187 MMHG

## 2018-01-03 PROCEDURE — B548ZZA ULTRASONOGRAPHY OF SUPERIOR VENA CAVA, GUIDANCE: ICD-10-PCS | Performed by: ORTHOPAEDIC SURGERY

## 2018-01-03 PROCEDURE — 02HV33Z INSERTION OF INFUSION DEVICE INTO SUPERIOR VENA CAVA, PERCUTANEOUS APPROACH: ICD-10-PCS | Performed by: ORTHOPAEDIC SURGERY

## 2018-01-04 VITALS — SYSTOLIC BLOOD PRESSURE: 190 MMHG | DIASTOLIC BLOOD PRESSURE: 88 MMHG

## 2018-01-04 VITALS — DIASTOLIC BLOOD PRESSURE: 77 MMHG | SYSTOLIC BLOOD PRESSURE: 149 MMHG

## 2018-01-04 VITALS — DIASTOLIC BLOOD PRESSURE: 87 MMHG | SYSTOLIC BLOOD PRESSURE: 126 MMHG

## 2018-01-04 VITALS — SYSTOLIC BLOOD PRESSURE: 163 MMHG | DIASTOLIC BLOOD PRESSURE: 69 MMHG

## 2018-01-04 VITALS — DIASTOLIC BLOOD PRESSURE: 68 MMHG | SYSTOLIC BLOOD PRESSURE: 118 MMHG

## 2018-01-04 LAB
ANION GAP SERPL CALC-SCNC: 10.4 MMOL/L (ref 8–16)
BASOPHILS NFR BLD AUTO: 0.2 % (ref 0–2)
BUN SERPL-MCNC: 19 MG/DL (ref 7–18)
CALCIUM SERPL-MCNC: 8.3 MG/DL (ref 8.5–10.1)
CHLORIDE SERPL-SCNC: 107 MMOL/L (ref 98–107)
CO2 SERPL-SCNC: 29.5 MMOL/L (ref 21–32)
CREAT SERPL-MCNC: 0.9 MG/DL (ref 0.6–1.3)
EOSINOPHIL NFR BLD: 2.8 % (ref 0–7)
ERYTHROCYTE [DISTWIDTH] IN BLOOD BY AUTOMATED COUNT: 13.2 % (ref 11.5–14.5)
GLUCOSE SERPL-MCNC: 85 MG/DL (ref 74–106)
HCT VFR BLD CALC: 28.7 % (ref 36–48)
HGB BLD-MCNC: 9.3 G/DL (ref 12–16)
IMM GRANULOCYTES NFR BLD: 1.1 % (ref 0–5)
LYMPHOCYTES NFR BLD AUTO: 30.2 % (ref 15–50)
MCH RBC QN AUTO: 31.4 PG (ref 26–34)
MCHC RBC AUTO-ENTMCNC: 32.4 G/DL (ref 31–37)
MCV RBC: 97 FL (ref 80–100)
MONOCYTES NFR BLD: 8 % (ref 2–11)
NEUTROPHILS NFR BLD AUTO: 57.7 % (ref 40–80)
OSMOLALITY SERPL CALC.SUM OF ELEC: 285 MOSM/KG (ref 275–300)
PLATELET # BLD: 312 10X3/UL (ref 130–400)
PMV BLD AUTO: 8.7 FL (ref 7.4–10.4)
POTASSIUM SERPL-SCNC: 3.9 MMOL/L (ref 3.5–5.1)
RBC # BLD AUTO: 2.96 10X6/UL (ref 4–5.4)
SODIUM SERPL-SCNC: 143 MMOL/L (ref 136–145)
WBC # BLD AUTO: 8.3 10X3/UL (ref 4.8–10.8)

## 2018-01-04 PROCEDURE — 0PBV0ZZ EXCISION OF LEFT FINGER PHALANX, OPEN APPROACH: ICD-10-PCS | Performed by: ORTHOPAEDIC SURGERY

## 2018-01-05 VITALS — SYSTOLIC BLOOD PRESSURE: 144 MMHG | DIASTOLIC BLOOD PRESSURE: 60 MMHG

## 2018-01-05 VITALS — SYSTOLIC BLOOD PRESSURE: 98 MMHG | DIASTOLIC BLOOD PRESSURE: 58 MMHG

## 2018-01-05 LAB
BASOPHILS NFR BLD AUTO: 0.1 % (ref 0–2)
EOSINOPHIL NFR BLD: 2.8 % (ref 0–7)
ERYTHROCYTE [DISTWIDTH] IN BLOOD BY AUTOMATED COUNT: 13.4 % (ref 11.5–14.5)
HCT VFR BLD CALC: 27.3 % (ref 36–48)
HGB BLD-MCNC: 8.8 G/DL (ref 12–16)
IMM GRANULOCYTES NFR BLD: 0.6 % (ref 0–5)
LYMPHOCYTES NFR BLD AUTO: 25.4 % (ref 15–50)
MCH RBC QN AUTO: 31.4 PG (ref 26–34)
MCHC RBC AUTO-ENTMCNC: 32.2 G/DL (ref 31–37)
MCV RBC: 97.5 FL (ref 80–100)
MONOCYTES NFR BLD: 7.1 % (ref 2–11)
NEUTROPHILS NFR BLD AUTO: 64 % (ref 40–80)
PLATELET # BLD: 286 10X3/UL (ref 130–400)
PMV BLD AUTO: 8.6 FL (ref 7.4–10.4)
RBC # BLD AUTO: 2.8 10X6/UL (ref 4–5.4)
WBC # BLD AUTO: 8.4 10X3/UL (ref 4.8–10.8)

## 2018-01-15 ENCOUNTER — HOSPITAL ENCOUNTER (OUTPATIENT)
Dept: HOSPITAL 84 - D.LABREF | Age: 58
Discharge: HOME | End: 2018-01-15
Attending: FAMILY MEDICINE
Payer: MEDICARE

## 2018-01-15 VITALS — BODY MASS INDEX: 45.6 KG/M2

## 2018-01-15 DIAGNOSIS — L03.114: Primary | ICD-10-CM

## 2018-01-15 LAB
BASOPHILS NFR BLD AUTO: 0.5 % (ref 0–2)
BUN SERPL-MCNC: 20 MG/DL (ref 7–18)
CREAT SERPL-MCNC: 0.9 MG/DL (ref 0.6–1.3)
CRP SERPL-MCNC: < 0.2 MG/DL (ref 0–0.9)
EOSINOPHIL NFR BLD: 4.6 % (ref 0–7)
ERYTHROCYTE [DISTWIDTH] IN BLOOD BY AUTOMATED COUNT: 13.8 % (ref 11.5–14.5)
ERYTHROCYTE [SEDIMENTATION RATE] IN BLOOD: 42 MM/HR (ref 0–30)
HCT VFR BLD CALC: 32 % (ref 36–48)
HGB BLD-MCNC: 10.1 G/DL (ref 12–16)
IMM GRANULOCYTES NFR BLD: 0.2 % (ref 0–5)
LYMPHOCYTES NFR BLD AUTO: 30.6 % (ref 15–50)
MCH RBC QN AUTO: 31.1 PG (ref 26–34)
MCHC RBC AUTO-ENTMCNC: 31.6 G/DL (ref 31–37)
MCV RBC: 98.5 FL (ref 80–100)
MONOCYTES NFR BLD: 10.4 % (ref 2–11)
NEUTROPHILS NFR BLD AUTO: 53.7 % (ref 40–80)
PLATELET # BLD: 220 10X3/UL (ref 130–400)
PMV BLD AUTO: 9.4 FL (ref 7.4–10.4)
RBC # BLD AUTO: 3.25 10X6/UL (ref 4–5.4)
WBC # BLD AUTO: 6.5 10X3/UL (ref 4.8–10.8)

## 2018-01-22 ENCOUNTER — HOSPITAL ENCOUNTER (OUTPATIENT)
Dept: HOSPITAL 84 - D.LABREF | Age: 58
Discharge: HOME | End: 2018-01-22
Attending: FAMILY MEDICINE
Payer: MEDICARE

## 2018-01-22 VITALS — BODY MASS INDEX: 45.6 KG/M2

## 2018-01-22 DIAGNOSIS — L03.012: Primary | ICD-10-CM

## 2018-01-22 LAB
BASOPHILS NFR BLD AUTO: 0 % (ref 0–2)
BUN SERPL-MCNC: 20 MG/DL (ref 7–18)
CREAT SERPL-MCNC: 0.9 MG/DL (ref 0.6–1.3)
CRP SERPL-MCNC: 1.8 MG/DL (ref 0–0.9)
EOSINOPHIL NFR BLD: 6.8 % (ref 0–7)
ERYTHROCYTE [DISTWIDTH] IN BLOOD BY AUTOMATED COUNT: 13.8 % (ref 11.5–14.5)
ERYTHROCYTE [SEDIMENTATION RATE] IN BLOOD: 5 MM/HR (ref 0–30)
HCT VFR BLD CALC: 34.8 % (ref 36–48)
HGB BLD-MCNC: 10.7 G/DL (ref 12–16)
IMM GRANULOCYTES NFR BLD: 0.5 % (ref 0–5)
LYMPHOCYTES NFR BLD AUTO: 36 % (ref 15–50)
MCH RBC QN AUTO: 30.4 PG (ref 26–34)
MCHC RBC AUTO-ENTMCNC: 30.7 G/DL (ref 31–37)
MCV RBC: 98.9 FL (ref 80–100)
MONOCYTES NFR BLD: 11 % (ref 2–11)
NEUTROPHILS NFR BLD AUTO: 45.7 % (ref 40–80)
PLATELET # BLD: 143 10X3/UL (ref 130–400)
PMV BLD AUTO: 9.8 FL (ref 7.4–10.4)
RBC # BLD AUTO: 3.52 10X6/UL (ref 4–5.4)
WBC # BLD AUTO: 4 10X3/UL (ref 4.8–10.8)

## 2018-01-29 ENCOUNTER — HOSPITAL ENCOUNTER (OUTPATIENT)
Dept: HOSPITAL 84 - D.LABREF | Age: 58
Discharge: HOME | End: 2018-01-29
Attending: INTERNAL MEDICINE
Payer: MEDICARE

## 2018-01-29 VITALS — BODY MASS INDEX: 45.6 KG/M2

## 2018-01-29 DIAGNOSIS — I10: ICD-10-CM

## 2018-01-29 DIAGNOSIS — Z79.2: ICD-10-CM

## 2018-01-29 DIAGNOSIS — L03.012: Primary | ICD-10-CM

## 2018-01-29 DIAGNOSIS — Z51.81: ICD-10-CM

## 2018-01-29 LAB
BASOPHILS NFR BLD AUTO: 0.2 % (ref 0–2)
BUN SERPL-MCNC: 18 MG/DL (ref 7–18)
CREAT SERPL-MCNC: 0.8 MG/DL (ref 0.6–1.3)
CRP SERPL-MCNC: 1.3 MG/DL (ref 0–0.9)
EOSINOPHIL NFR BLD: 5.2 % (ref 0–7)
ERYTHROCYTE [DISTWIDTH] IN BLOOD BY AUTOMATED COUNT: 13.9 % (ref 11.5–14.5)
ERYTHROCYTE [SEDIMENTATION RATE] IN BLOOD: 49 MM/HR (ref 0–30)
HCT VFR BLD CALC: 31.7 % (ref 36–48)
HGB BLD-MCNC: 10 G/DL (ref 12–16)
IMM GRANULOCYTES NFR BLD: 0.3 % (ref 0–5)
LYMPHOCYTES NFR BLD AUTO: 27.3 % (ref 15–50)
MCH RBC QN AUTO: 31.1 PG (ref 26–34)
MCHC RBC AUTO-ENTMCNC: 31.5 G/DL (ref 31–37)
MCV RBC: 98.4 FL (ref 80–100)
MONOCYTES NFR BLD: 10 % (ref 2–11)
NEUTROPHILS NFR BLD AUTO: 57 % (ref 40–80)
PLATELET # BLD: 220 10X3/UL (ref 130–400)
PMV BLD AUTO: 9.4 FL (ref 7.4–10.4)
RBC # BLD AUTO: 3.22 10X6/UL (ref 4–5.4)
WBC # BLD AUTO: 6.4 10X3/UL (ref 4.8–10.8)

## 2018-02-02 ENCOUNTER — HOSPITAL ENCOUNTER (OUTPATIENT)
Dept: HOSPITAL 84 - D.LABREF | Age: 58
Discharge: HOME | End: 2018-02-02
Attending: INTERNAL MEDICINE
Payer: MEDICARE

## 2018-02-02 VITALS — BODY MASS INDEX: 45.6 KG/M2

## 2018-02-02 DIAGNOSIS — Z51.81: ICD-10-CM

## 2018-02-02 DIAGNOSIS — Z79.2: ICD-10-CM

## 2018-02-02 DIAGNOSIS — M19.042: Primary | ICD-10-CM

## 2018-02-02 LAB
BASOPHILS NFR BLD AUTO: 0.2 % (ref 0–2)
BUN SERPL-MCNC: 17 MG/DL (ref 7–18)
CREAT SERPL-MCNC: 1 MG/DL (ref 0.6–1.3)
CRP SERPL-MCNC: 0.4 MG/DL (ref 0–0.9)
EOSINOPHIL NFR BLD: 2.7 % (ref 0–7)
ERYTHROCYTE [DISTWIDTH] IN BLOOD BY AUTOMATED COUNT: 13.9 % (ref 11.5–14.5)
ERYTHROCYTE [SEDIMENTATION RATE] IN BLOOD: 47 MM/HR (ref 0–30)
HCT VFR BLD CALC: 34 % (ref 36–48)
HGB BLD-MCNC: 10.9 G/DL (ref 12–16)
IMM GRANULOCYTES NFR BLD: 0.4 % (ref 0–5)
LYMPHOCYTES NFR BLD AUTO: 19.9 % (ref 15–50)
MCH RBC QN AUTO: 31.4 PG (ref 26–34)
MCHC RBC AUTO-ENTMCNC: 32.1 G/DL (ref 31–37)
MCV RBC: 98 FL (ref 80–100)
MONOCYTES NFR BLD: 7.5 % (ref 2–11)
NEUTROPHILS NFR BLD AUTO: 69.3 % (ref 40–80)
PLATELET # BLD: 245 10X3/UL (ref 130–400)
PMV BLD AUTO: 9.6 FL (ref 7.4–10.4)
RBC # BLD AUTO: 3.47 10X6/UL (ref 4–5.4)
WBC # BLD AUTO: 8 10X3/UL (ref 4.8–10.8)

## 2018-03-27 ENCOUNTER — HOSPITAL ENCOUNTER (INPATIENT)
Dept: HOSPITAL 84 - D.ER | Age: 58
LOS: 3 days | Discharge: HOME | DRG: 683 | End: 2018-03-30
Attending: FAMILY MEDICINE | Admitting: FAMILY MEDICINE
Payer: MEDICARE

## 2018-03-27 VITALS
HEIGHT: 68 IN | BODY MASS INDEX: 44.41 KG/M2 | WEIGHT: 293 LBS | WEIGHT: 293 LBS | HEIGHT: 68 IN | BODY MASS INDEX: 44.41 KG/M2 | BODY MASS INDEX: 44.41 KG/M2

## 2018-03-27 DIAGNOSIS — I25.10: ICD-10-CM

## 2018-03-27 DIAGNOSIS — N39.0: ICD-10-CM

## 2018-03-27 DIAGNOSIS — E11.51: ICD-10-CM

## 2018-03-27 DIAGNOSIS — R41.0: ICD-10-CM

## 2018-03-27 DIAGNOSIS — N17.9: Primary | ICD-10-CM

## 2018-03-27 DIAGNOSIS — I95.1: ICD-10-CM

## 2018-03-27 DIAGNOSIS — D64.9: ICD-10-CM

## 2018-03-27 DIAGNOSIS — I10: ICD-10-CM

## 2018-03-27 LAB
ALBUMIN SERPL-MCNC: 3.2 G/DL (ref 3.4–5)
ALP SERPL-CCNC: 113 U/L (ref 46–116)
ALT SERPL-CCNC: 50 U/L (ref 10–68)
ANION GAP SERPL CALC-SCNC: 18.1 MMOL/L (ref 8–16)
APPEARANCE UR: CLEAR
BACTERIA #/AREA URNS HPF: (no result) /HPF
BASOPHILS NFR BLD AUTO: 0.5 % (ref 0–2)
BILIRUB SERPL-MCNC: 0.43 MG/DL (ref 0.2–1.3)
BILIRUB SERPL-MCNC: NEGATIVE MG/DL
BUN SERPL-MCNC: 48 MG/DL (ref 7–18)
CALCIUM SERPL-MCNC: 7.3 MG/DL (ref 8.5–10.1)
CHLORIDE SERPL-SCNC: 101 MMOL/L (ref 98–107)
CK MB SERPL-MCNC: 18.5 U/L (ref 0–3.6)
CO2 SERPL-SCNC: 22.1 MMOL/L (ref 21–32)
COLOR UR: YELLOW
CREAT SERPL-MCNC: 2.7 MG/DL (ref 0.6–1.3)
EOSINOPHIL NFR BLD: 5.3 % (ref 0–7)
ERYTHROCYTE [DISTWIDTH] IN BLOOD BY AUTOMATED COUNT: 13.9 % (ref 11.5–14.5)
GLOBULIN SER-MCNC: 3.4 G/L
GLUCOSE SERPL-MCNC: 115 MG/DL (ref 74–106)
GLUCOSE SERPL-MCNC: NEGATIVE MG/DL
HCT VFR BLD CALC: 33.4 % (ref 36–48)
HGB BLD-MCNC: 11.1 G/DL (ref 12–16)
IMM GRANULOCYTES NFR BLD: 0.3 % (ref 0–5)
KETONES UR STRIP-MCNC: NEGATIVE MG/DL
LYMPHOCYTES NFR BLD AUTO: 28.7 % (ref 15–50)
MCH RBC QN AUTO: 31.3 PG (ref 26–34)
MCHC RBC AUTO-ENTMCNC: 33.2 G/DL (ref 31–37)
MCV RBC: 94.1 FL (ref 80–100)
MONOCYTES NFR BLD: 8.3 % (ref 2–11)
NEUTROPHILS NFR BLD AUTO: 56.9 % (ref 40–80)
NITRITE UR-MCNC: NEGATIVE MG/ML
OSMOLALITY SERPL CALC.SUM OF ELEC: 287 MOSM/KG (ref 275–300)
PH UR STRIP: 5 [PH] (ref 5–6)
PLATELET # BLD: 201 10X3/UL (ref 130–400)
PMV BLD AUTO: 9.1 FL (ref 7.4–10.4)
POTASSIUM SERPL-SCNC: 4.2 MMOL/L (ref 3.5–5.1)
PROT SERPL-MCNC: 6.6 G/DL (ref 6.4–8.2)
PROT UR-MCNC: NEGATIVE MG/DL
RBC # BLD AUTO: 3.55 10X6/UL (ref 4–5.4)
RBC #/AREA URNS HPF: (no result) /HPF (ref 0–5)
SODIUM SERPL-SCNC: 137 MMOL/L (ref 136–145)
SP GR UR STRIP: 1.02 (ref 1–1.02)
SQUAMOUS #/AREA URNS HPF: (no result) /HPF (ref 0–5)
TROPONIN I SERPL-MCNC: < 0.017 NG/ML (ref 0–0.06)
UROBILINOGEN UR-MCNC: NORMAL MG/DL
WBC # BLD AUTO: 8.8 10X3/UL (ref 4.8–10.8)
WBC #/AREA URNS HPF: (no result) /HPF (ref 0–5)

## 2018-03-28 VITALS — DIASTOLIC BLOOD PRESSURE: 38 MMHG | SYSTOLIC BLOOD PRESSURE: 110 MMHG

## 2018-03-28 VITALS — SYSTOLIC BLOOD PRESSURE: 102 MMHG | DIASTOLIC BLOOD PRESSURE: 40 MMHG

## 2018-03-28 VITALS — SYSTOLIC BLOOD PRESSURE: 125 MMHG | DIASTOLIC BLOOD PRESSURE: 53 MMHG

## 2018-03-28 VITALS — SYSTOLIC BLOOD PRESSURE: 110 MMHG | DIASTOLIC BLOOD PRESSURE: 38 MMHG

## 2018-03-28 LAB
ALBUMIN SERPL-MCNC: 3 G/DL (ref 3.4–5)
ALP SERPL-CCNC: 97 U/L (ref 46–116)
ALT SERPL-CCNC: 45 U/L (ref 10–68)
ANION GAP SERPL CALC-SCNC: 16.4 MMOL/L (ref 8–16)
BASOPHILS NFR BLD AUTO: 0.5 % (ref 0–2)
BILIRUB SERPL-MCNC: 0.4 MG/DL (ref 0.2–1.3)
BUN SERPL-MCNC: 52 MG/DL (ref 7–18)
CALCIUM SERPL-MCNC: 7.1 MG/DL (ref 8.5–10.1)
CHLORIDE SERPL-SCNC: 99 MMOL/L (ref 98–107)
CO2 SERPL-SCNC: 20.6 MMOL/L (ref 21–32)
CREAT SERPL-MCNC: 3 MG/DL (ref 0.6–1.3)
EOSINOPHIL NFR BLD: 4.5 % (ref 0–7)
ERYTHROCYTE [DISTWIDTH] IN BLOOD BY AUTOMATED COUNT: 13.8 % (ref 11.5–14.5)
GLOBULIN SER-MCNC: 3.1 G/L
GLUCOSE SERPL-MCNC: 270 MG/DL (ref 74–106)
HCT VFR BLD CALC: 31.7 % (ref 36–48)
HGB BLD-MCNC: 10.6 G/DL (ref 12–16)
IMM GRANULOCYTES NFR BLD: 0.4 % (ref 0–5)
LYMPHOCYTES NFR BLD AUTO: 28.4 % (ref 15–50)
MCH RBC QN AUTO: 31.5 PG (ref 26–34)
MCHC RBC AUTO-ENTMCNC: 33.4 G/DL (ref 31–37)
MCV RBC: 94.3 FL (ref 80–100)
MONOCYTES NFR BLD: 8.8 % (ref 2–11)
NEUTROPHILS NFR BLD AUTO: 57.4 % (ref 40–80)
OSMOLALITY SERPL CALC.SUM OF ELEC: 286 MOSM/KG (ref 275–300)
PLATELET # BLD: 176 10X3/UL (ref 130–400)
PMV BLD AUTO: 9.6 FL (ref 7.4–10.4)
POTASSIUM SERPL-SCNC: 5 MMOL/L (ref 3.5–5.1)
PROT SERPL-MCNC: 6.1 G/DL (ref 6.4–8.2)
RBC # BLD AUTO: 3.36 10X6/UL (ref 4–5.4)
SODIUM SERPL-SCNC: 131 MMOL/L (ref 136–145)
WBC # BLD AUTO: 7.8 10X3/UL (ref 4.8–10.8)

## 2018-03-29 VITALS — DIASTOLIC BLOOD PRESSURE: 45 MMHG | SYSTOLIC BLOOD PRESSURE: 152 MMHG

## 2018-03-29 VITALS — DIASTOLIC BLOOD PRESSURE: 65 MMHG | SYSTOLIC BLOOD PRESSURE: 174 MMHG

## 2018-03-29 VITALS — DIASTOLIC BLOOD PRESSURE: 43 MMHG | SYSTOLIC BLOOD PRESSURE: 124 MMHG

## 2018-03-29 VITALS — SYSTOLIC BLOOD PRESSURE: 147 MMHG | DIASTOLIC BLOOD PRESSURE: 57 MMHG

## 2018-03-29 VITALS — DIASTOLIC BLOOD PRESSURE: 72 MMHG | SYSTOLIC BLOOD PRESSURE: 134 MMHG

## 2018-03-29 LAB
ALBUMIN SERPL-MCNC: 2.8 G/DL (ref 3.4–5)
ALP SERPL-CCNC: 98 U/L (ref 46–116)
ALT SERPL-CCNC: 41 U/L (ref 10–68)
ANION GAP SERPL CALC-SCNC: 12.5 MMOL/L (ref 8–16)
BASOPHILS NFR BLD AUTO: 0.4 % (ref 0–2)
BILIRUB SERPL-MCNC: 0.2 MG/DL (ref 0.2–1.3)
BUN SERPL-MCNC: 48 MG/DL (ref 7–18)
CALCIUM SERPL-MCNC: 7.2 MG/DL (ref 8.5–10.1)
CHLORIDE SERPL-SCNC: 104 MMOL/L (ref 98–107)
CO2 SERPL-SCNC: 23.5 MMOL/L (ref 21–32)
CREAT SERPL-MCNC: 1.8 MG/DL (ref 0.6–1.3)
EOSINOPHIL NFR BLD: 5.7 % (ref 0–7)
ERYTHROCYTE [DISTWIDTH] IN BLOOD BY AUTOMATED COUNT: 13.7 % (ref 11.5–14.5)
GLOBULIN SER-MCNC: 3.4 G/L
GLUCOSE SERPL-MCNC: 308 MG/DL (ref 74–106)
HCT VFR BLD CALC: 32.3 % (ref 36–48)
HGB BLD-MCNC: 10.5 G/DL (ref 12–16)
IMM GRANULOCYTES NFR BLD: 0.6 % (ref 0–5)
LYMPHOCYTES NFR BLD AUTO: 37.3 % (ref 15–50)
MCH RBC QN AUTO: 30.5 PG (ref 26–34)
MCHC RBC AUTO-ENTMCNC: 32.5 G/DL (ref 31–37)
MCV RBC: 93.9 FL (ref 80–100)
MONOCYTES NFR BLD: 11.7 % (ref 2–11)
NEUTROPHILS NFR BLD AUTO: 44.3 % (ref 40–80)
OSMOLALITY SERPL CALC.SUM OF ELEC: 294 MOSM/KG (ref 275–300)
PLATELET # BLD: 183 10X3/UL (ref 130–400)
PMV BLD AUTO: 9.6 FL (ref 7.4–10.4)
POTASSIUM SERPL-SCNC: 5 MMOL/L (ref 3.5–5.1)
PROT SERPL-MCNC: 6.2 G/DL (ref 6.4–8.2)
RBC # BLD AUTO: 3.44 10X6/UL (ref 4–5.4)
SODIUM SERPL-SCNC: 135 MMOL/L (ref 136–145)
WBC # BLD AUTO: 5.1 10X3/UL (ref 4.8–10.8)

## 2018-03-30 VITALS — SYSTOLIC BLOOD PRESSURE: 149 MMHG | DIASTOLIC BLOOD PRESSURE: 68 MMHG

## 2018-03-30 VITALS — SYSTOLIC BLOOD PRESSURE: 145 MMHG | DIASTOLIC BLOOD PRESSURE: 49 MMHG

## 2018-03-30 VITALS — DIASTOLIC BLOOD PRESSURE: 57 MMHG | SYSTOLIC BLOOD PRESSURE: 150 MMHG

## 2018-03-30 LAB
ALBUMIN SERPL-MCNC: 2.8 G/DL (ref 3.4–5)
ALP SERPL-CCNC: 95 U/L (ref 46–116)
ALT SERPL-CCNC: 35 U/L (ref 10–68)
ANION GAP SERPL CALC-SCNC: 15 MMOL/L (ref 8–16)
BASOPHILS NFR BLD AUTO: 0.4 % (ref 0–2)
BILIRUB SERPL-MCNC: 0.2 MG/DL (ref 0.2–1.3)
BUN SERPL-MCNC: 34 MG/DL (ref 7–18)
CALCIUM SERPL-MCNC: 7.4 MG/DL (ref 8.5–10.1)
CHLORIDE SERPL-SCNC: 106 MMOL/L (ref 98–107)
CO2 SERPL-SCNC: 22.6 MMOL/L (ref 21–32)
CREAT SERPL-MCNC: 1.2 MG/DL (ref 0.6–1.3)
EOSINOPHIL NFR BLD: 6 % (ref 0–7)
ERYTHROCYTE [DISTWIDTH] IN BLOOD BY AUTOMATED COUNT: 13.9 % (ref 11.5–14.5)
GLOBULIN SER-MCNC: 3.2 G/L
GLUCOSE SERPL-MCNC: 277 MG/DL (ref 74–106)
HCT VFR BLD CALC: 30.5 % (ref 36–48)
HGB BLD-MCNC: 10.1 G/DL (ref 12–16)
IMM GRANULOCYTES NFR BLD: 0.2 % (ref 0–5)
LYMPHOCYTES NFR BLD AUTO: 39.6 % (ref 15–50)
MCH RBC QN AUTO: 31.1 PG (ref 26–34)
MCHC RBC AUTO-ENTMCNC: 33.1 G/DL (ref 31–37)
MCV RBC: 93.8 FL (ref 80–100)
MONOCYTES NFR BLD: 11.1 % (ref 2–11)
NEUTROPHILS NFR BLD AUTO: 42.7 % (ref 40–80)
OSMOLALITY SERPL CALC.SUM OF ELEC: 295 MOSM/KG (ref 275–300)
PLATELET # BLD: 152 10X3/UL (ref 130–400)
PMV BLD AUTO: 9.5 FL (ref 7.4–10.4)
POTASSIUM SERPL-SCNC: 4.6 MMOL/L (ref 3.5–5.1)
PROT SERPL-MCNC: 6 G/DL (ref 6.4–8.2)
RBC # BLD AUTO: 3.25 10X6/UL (ref 4–5.4)
SODIUM SERPL-SCNC: 139 MMOL/L (ref 136–145)
WBC # BLD AUTO: 4.7 10X3/UL (ref 4.8–10.8)

## 2018-09-20 ENCOUNTER — HOSPITAL ENCOUNTER (EMERGENCY)
Dept: HOSPITAL 84 - D.ER | Age: 58
Discharge: HOME | End: 2018-09-20
Payer: MEDICARE

## 2018-09-20 VITALS — BODY MASS INDEX: 44.41 KG/M2 | WEIGHT: 293 LBS | HEIGHT: 68 IN

## 2018-09-20 VITALS — DIASTOLIC BLOOD PRESSURE: 72 MMHG | SYSTOLIC BLOOD PRESSURE: 178 MMHG

## 2018-09-20 DIAGNOSIS — F17.200: ICD-10-CM

## 2018-09-20 DIAGNOSIS — G47.30: ICD-10-CM

## 2018-09-20 DIAGNOSIS — Z79.4: ICD-10-CM

## 2018-09-20 DIAGNOSIS — E11.65: Primary | ICD-10-CM

## 2018-09-20 DIAGNOSIS — I10: ICD-10-CM

## 2018-09-20 LAB
ALBUMIN SERPL-MCNC: 2.8 G/DL (ref 3.4–5)
ALP SERPL-CCNC: 118 U/L (ref 46–116)
ALT SERPL-CCNC: 69 U/L (ref 10–68)
ANION GAP SERPL CALC-SCNC: 13.3 MMOL/L (ref 8–16)
APPEARANCE UR: CLEAR
BACTERIA #/AREA URNS HPF: (no result) /HPF
BASOPHILS NFR BLD AUTO: 0.2 % (ref 0–2)
BILIRUB SERPL-MCNC: 0.25 MG/DL (ref 0.2–1.3)
BILIRUB SERPL-MCNC: NEGATIVE MG/DL
BUN SERPL-MCNC: 28 MG/DL (ref 7–18)
CALCIUM SERPL-MCNC: 8.5 MG/DL (ref 8.5–10.1)
CHLORIDE SERPL-SCNC: 100 MMOL/L (ref 98–107)
CK MB SERPL-MCNC: 5 U/L (ref 0–3.6)
CK SERPL-CCNC: 224 UL (ref 21–215)
CO2 SERPL-SCNC: 28.5 MMOL/L (ref 21–32)
COLOR UR: YELLOW
CREAT SERPL-MCNC: 1.3 MG/DL (ref 0.6–1.3)
EOSINOPHIL NFR BLD: 4 % (ref 0–7)
ERYTHROCYTE [DISTWIDTH] IN BLOOD BY AUTOMATED COUNT: 13.4 % (ref 11.5–14.5)
GLOBULIN SER-MCNC: 3.4 G/L
GLUCOSE SERPL-MCNC: 1000 MG/DL
GLUCOSE SERPL-MCNC: 422 MG/DL (ref 74–106)
HCT VFR BLD CALC: 31.6 % (ref 36–48)
HGB BLD-MCNC: 10.3 G/DL (ref 12–16)
IMM GRANULOCYTES NFR BLD: 0.3 % (ref 0–5)
KETONES SERPL-MCNC: NEGATIVE MG/DL
KETONES UR STRIP-MCNC: NEGATIVE MG/DL
LYMPHOCYTES NFR BLD AUTO: 26 % (ref 15–50)
MCH RBC QN AUTO: 31.5 PG (ref 26–34)
MCHC RBC AUTO-ENTMCNC: 32.6 G/DL (ref 31–37)
MCV RBC: 96.6 FL (ref 80–100)
MONOCYTES NFR BLD: 10.8 % (ref 2–11)
NEUTROPHILS NFR BLD AUTO: 58.7 % (ref 40–80)
NITRITE UR-MCNC: NEGATIVE MG/ML
OSMOLALITY SERPL CALC.SUM OF ELEC: 297 MOSM/KG (ref 275–300)
PH UR STRIP: 5 [PH] (ref 5–6)
PLATELET # BLD: 201 10X3/UL (ref 130–400)
PMV BLD AUTO: 9 FL (ref 7.4–10.4)
POTASSIUM SERPL-SCNC: 4.8 MMOL/L (ref 3.5–5.1)
PROT SERPL-MCNC: 6.2 G/DL (ref 6.4–8.2)
PROT UR-MCNC: (no result) MG/DL
RBC # BLD AUTO: 3.27 10X6/UL (ref 4–5.4)
RBC #/AREA URNS HPF: (no result) /HPF (ref 0–5)
SODIUM SERPL-SCNC: 137 MMOL/L (ref 136–145)
SP GR UR STRIP: 1.01 (ref 1–1.02)
SQUAMOUS #/AREA URNS HPF: (no result) /HPF (ref 0–5)
TROPONIN I SERPL-MCNC: < 0.017 NG/ML (ref 0–0.06)
UROBILINOGEN UR-MCNC: NORMAL MG/DL
WBC # BLD AUTO: 6.2 10X3/UL (ref 4.8–10.8)
WBC #/AREA URNS HPF: (no result) /HPF (ref 0–5)

## 2018-11-17 ENCOUNTER — HOSPITAL ENCOUNTER (INPATIENT)
Dept: HOSPITAL 84 - D.ER | Age: 58
LOS: 3 days | Discharge: HOME | DRG: 638 | End: 2018-11-20
Attending: FAMILY MEDICINE | Admitting: FAMILY MEDICINE
Payer: COMMERCIAL

## 2018-11-17 VITALS
BODY MASS INDEX: 44.41 KG/M2 | WEIGHT: 293 LBS | BODY MASS INDEX: 44.41 KG/M2 | HEIGHT: 68 IN | HEIGHT: 68 IN | BODY MASS INDEX: 44.41 KG/M2 | WEIGHT: 293 LBS

## 2018-11-17 VITALS — SYSTOLIC BLOOD PRESSURE: 86 MMHG | DIASTOLIC BLOOD PRESSURE: 45 MMHG

## 2018-11-17 VITALS — SYSTOLIC BLOOD PRESSURE: 103 MMHG | DIASTOLIC BLOOD PRESSURE: 53 MMHG

## 2018-11-17 VITALS — SYSTOLIC BLOOD PRESSURE: 90 MMHG | DIASTOLIC BLOOD PRESSURE: 46 MMHG

## 2018-11-17 DIAGNOSIS — E11.622: ICD-10-CM

## 2018-11-17 DIAGNOSIS — L03.116: ICD-10-CM

## 2018-11-17 DIAGNOSIS — E11.628: Primary | ICD-10-CM

## 2018-11-17 DIAGNOSIS — I12.9: ICD-10-CM

## 2018-11-17 DIAGNOSIS — L97.212: ICD-10-CM

## 2018-11-17 DIAGNOSIS — N18.9: ICD-10-CM

## 2018-11-17 DIAGNOSIS — Z87.891: ICD-10-CM

## 2018-11-17 DIAGNOSIS — E11.22: ICD-10-CM

## 2018-11-17 DIAGNOSIS — Z79.4: ICD-10-CM

## 2018-11-17 LAB
ALBUMIN SERPL-MCNC: 3 G/DL (ref 3.4–5)
ALP SERPL-CCNC: 142 U/L (ref 46–116)
ALT SERPL-CCNC: 38 U/L (ref 10–68)
ANION GAP SERPL CALC-SCNC: 12.2 MMOL/L (ref 8–16)
BASOPHILS NFR BLD AUTO: 0.2 % (ref 0–2)
BILIRUB SERPL-MCNC: 0.44 MG/DL (ref 0.2–1.3)
BUN SERPL-MCNC: 64 MG/DL (ref 7–18)
CALCIUM SERPL-MCNC: 9.2 MG/DL (ref 8.5–10.1)
CHLORIDE SERPL-SCNC: 94 MMOL/L (ref 98–107)
CO2 SERPL-SCNC: 26.5 MMOL/L (ref 21–32)
CREAT SERPL-MCNC: 2.2 MG/DL (ref 0.6–1.3)
CRP SERPL-MCNC: 18.9 MG/DL (ref 0–0.9)
EOSINOPHIL NFR BLD: 7.5 % (ref 0–7)
ERYTHROCYTE [DISTWIDTH] IN BLOOD BY AUTOMATED COUNT: 13.2 % (ref 11.5–14.5)
GLOBULIN SER-MCNC: 4.2 G/L
GLUCOSE SERPL-MCNC: 396 MG/DL (ref 74–106)
HCT VFR BLD CALC: 32.1 % (ref 36–48)
HGB BLD-MCNC: 10.6 G/DL (ref 12–16)
IMM GRANULOCYTES NFR BLD: 0.5 % (ref 0–5)
LYMPHOCYTES NFR BLD AUTO: 17.5 % (ref 15–50)
MCH RBC QN AUTO: 30.9 PG (ref 26–34)
MCHC RBC AUTO-ENTMCNC: 33 G/DL (ref 31–37)
MCV RBC: 93.6 FL (ref 80–100)
MONOCYTES NFR BLD: 7 % (ref 2–11)
NEUTROPHILS NFR BLD AUTO: 67.3 % (ref 40–80)
OSMOLALITY SERPL CALC.SUM OF ELEC: 291 MOSM/KG (ref 275–300)
PLATELET # BLD: 259 10X3/UL (ref 130–400)
PMV BLD AUTO: 9.6 FL (ref 7.4–10.4)
POTASSIUM SERPL-SCNC: 4.7 MMOL/L (ref 3.5–5.1)
PROT SERPL-MCNC: 7.2 G/DL (ref 6.4–8.2)
RBC # BLD AUTO: 3.43 10X6/UL (ref 4–5.4)
SODIUM SERPL-SCNC: 128 MMOL/L (ref 136–145)
WBC # BLD AUTO: 12.7 10X3/UL (ref 4.8–10.8)

## 2018-11-18 VITALS — SYSTOLIC BLOOD PRESSURE: 92 MMHG | DIASTOLIC BLOOD PRESSURE: 46 MMHG

## 2018-11-18 VITALS — DIASTOLIC BLOOD PRESSURE: 46 MMHG | SYSTOLIC BLOOD PRESSURE: 90 MMHG

## 2018-11-18 VITALS — DIASTOLIC BLOOD PRESSURE: 53 MMHG | SYSTOLIC BLOOD PRESSURE: 107 MMHG

## 2018-11-18 VITALS — DIASTOLIC BLOOD PRESSURE: 72 MMHG | SYSTOLIC BLOOD PRESSURE: 139 MMHG

## 2018-11-18 VITALS — SYSTOLIC BLOOD PRESSURE: 100 MMHG | DIASTOLIC BLOOD PRESSURE: 49 MMHG

## 2018-11-18 LAB
ALBUMIN SERPL-MCNC: 2.6 G/DL (ref 3.4–5)
ALP SERPL-CCNC: 122 U/L (ref 46–116)
ALT SERPL-CCNC: 29 U/L (ref 10–68)
ANION GAP SERPL CALC-SCNC: 12.3 MMOL/L (ref 8–16)
APPEARANCE UR: (no result)
BASOPHILS NFR BLD AUTO: 0.1 % (ref 0–2)
BILIRUB SERPL-MCNC: 0.44 MG/DL (ref 0.2–1.3)
BILIRUB SERPL-MCNC: NEGATIVE MG/DL
BUN SERPL-MCNC: 60 MG/DL (ref 7–18)
CALCIUM SERPL-MCNC: 8.2 MG/DL (ref 8.5–10.1)
CHLORIDE SERPL-SCNC: 96 MMOL/L (ref 98–107)
CO2 SERPL-SCNC: 25.8 MMOL/L (ref 21–32)
COLOR UR: YELLOW
CREAT SERPL-MCNC: 2.1 MG/DL (ref 0.6–1.3)
EOSINOPHIL NFR BLD: 9.1 % (ref 0–7)
ERYTHROCYTE [DISTWIDTH] IN BLOOD BY AUTOMATED COUNT: 13.5 % (ref 11.5–14.5)
GLOBULIN SER-MCNC: 3.2 G/L
GLUCOSE SERPL-MCNC: 250 MG/DL
GLUCOSE SERPL-MCNC: 392 MG/DL (ref 74–106)
HCT VFR BLD CALC: 29.6 % (ref 36–48)
HGB BLD-MCNC: 9.7 G/DL (ref 12–16)
IMM GRANULOCYTES NFR BLD: 0.6 % (ref 0–5)
KETONES UR STRIP-MCNC: NEGATIVE MG/DL
LYMPHOCYTES NFR BLD AUTO: 19.6 % (ref 15–50)
MCH RBC QN AUTO: 30.9 PG (ref 26–34)
MCHC RBC AUTO-ENTMCNC: 32.8 G/DL (ref 31–37)
MCV RBC: 94.3 FL (ref 80–100)
MONOCYTES NFR BLD: 8.6 % (ref 2–11)
NEUTROPHILS NFR BLD AUTO: 62 % (ref 40–80)
NITRITE UR-MCNC: NEGATIVE MG/ML
OSMOLALITY SERPL CALC.SUM OF ELEC: 291 MOSM/KG (ref 275–300)
PH UR STRIP: 5 [PH] (ref 5–6)
PLATELET # BLD: 242 10X3/UL (ref 130–400)
PMV BLD AUTO: 9.9 FL (ref 7.4–10.4)
POTASSIUM SERPL-SCNC: 5.1 MMOL/L (ref 3.5–5.1)
PROT SERPL-MCNC: 5.8 G/DL (ref 6.4–8.2)
PROT UR-MCNC: NEGATIVE MG/DL
RBC # BLD AUTO: 3.14 10X6/UL (ref 4–5.4)
SODIUM SERPL-SCNC: 129 MMOL/L (ref 136–145)
SP GR UR STRIP: 1.01 (ref 1–1.02)
UROBILINOGEN UR-MCNC: NORMAL MG/DL
WBC # BLD AUTO: 12.3 10X3/UL (ref 4.8–10.8)

## 2018-11-19 VITALS — DIASTOLIC BLOOD PRESSURE: 74 MMHG | SYSTOLIC BLOOD PRESSURE: 152 MMHG

## 2018-11-19 VITALS — DIASTOLIC BLOOD PRESSURE: 56 MMHG | SYSTOLIC BLOOD PRESSURE: 133 MMHG

## 2018-11-19 VITALS — SYSTOLIC BLOOD PRESSURE: 111 MMHG | DIASTOLIC BLOOD PRESSURE: 45 MMHG

## 2018-11-19 VITALS — DIASTOLIC BLOOD PRESSURE: 62 MMHG | SYSTOLIC BLOOD PRESSURE: 131 MMHG

## 2018-11-19 VITALS — DIASTOLIC BLOOD PRESSURE: 39 MMHG | SYSTOLIC BLOOD PRESSURE: 140 MMHG

## 2018-11-19 VITALS — SYSTOLIC BLOOD PRESSURE: 129 MMHG | DIASTOLIC BLOOD PRESSURE: 63 MMHG

## 2018-11-19 LAB
ANION GAP SERPL CALC-SCNC: 10.6 MMOL/L (ref 8–16)
BASOPHILS NFR BLD AUTO: 0.1 % (ref 0–2)
BUN SERPL-MCNC: 62 MG/DL (ref 7–18)
CALCIUM SERPL-MCNC: 8.3 MG/DL (ref 8.5–10.1)
CHLORIDE SERPL-SCNC: 98 MMOL/L (ref 98–107)
CO2 SERPL-SCNC: 27.2 MMOL/L (ref 21–32)
CREAT SERPL-MCNC: 1.8 MG/DL (ref 0.6–1.3)
EOSINOPHIL NFR BLD: 13.7 % (ref 0–7)
ERYTHROCYTE [DISTWIDTH] IN BLOOD BY AUTOMATED COUNT: 13.5 % (ref 11.5–14.5)
GLUCOSE SERPL-MCNC: 426 MG/DL (ref 74–106)
HCT VFR BLD CALC: 29.4 % (ref 36–48)
HGB BLD-MCNC: 9.4 G/DL (ref 12–16)
IMM GRANULOCYTES NFR BLD: 0.9 % (ref 0–5)
LYMPHOCYTES NFR BLD AUTO: 25.1 % (ref 15–50)
MCH RBC QN AUTO: 30.7 PG (ref 26–34)
MCHC RBC AUTO-ENTMCNC: 32 G/DL (ref 31–37)
MCV RBC: 96.1 FL (ref 80–100)
MONOCYTES NFR BLD: 8.3 % (ref 2–11)
NEUTROPHILS NFR BLD AUTO: 51.9 % (ref 40–80)
OSMOLALITY SERPL CALC.SUM OF ELEC: 298 MOSM/KG (ref 275–300)
PLATELET # BLD: 229 10X3/UL (ref 130–400)
PMV BLD AUTO: 9.8 FL (ref 7.4–10.4)
POTASSIUM SERPL-SCNC: 4.8 MMOL/L (ref 3.5–5.1)
RBC # BLD AUTO: 3.06 10X6/UL (ref 4–5.4)
SODIUM SERPL-SCNC: 131 MMOL/L (ref 136–145)
WBC # BLD AUTO: 8.6 10X3/UL (ref 4.8–10.8)

## 2018-11-20 VITALS — DIASTOLIC BLOOD PRESSURE: 65 MMHG | SYSTOLIC BLOOD PRESSURE: 117 MMHG

## 2018-11-20 VITALS — SYSTOLIC BLOOD PRESSURE: 125 MMHG | DIASTOLIC BLOOD PRESSURE: 62 MMHG

## 2018-11-20 VITALS — DIASTOLIC BLOOD PRESSURE: 56 MMHG | SYSTOLIC BLOOD PRESSURE: 145 MMHG

## 2018-11-20 VITALS — SYSTOLIC BLOOD PRESSURE: 156 MMHG | DIASTOLIC BLOOD PRESSURE: 61 MMHG

## 2019-04-08 ENCOUNTER — HOSPITAL ENCOUNTER (INPATIENT)
Dept: HOSPITAL 84 - D.ER | Age: 59
LOS: 5 days | Discharge: HOME | DRG: 871 | End: 2019-04-13
Attending: FAMILY MEDICINE | Admitting: FAMILY MEDICINE
Payer: COMMERCIAL

## 2019-04-08 VITALS — SYSTOLIC BLOOD PRESSURE: 92 MMHG | DIASTOLIC BLOOD PRESSURE: 71 MMHG

## 2019-04-08 VITALS — SYSTOLIC BLOOD PRESSURE: 84 MMHG | DIASTOLIC BLOOD PRESSURE: 70 MMHG

## 2019-04-08 VITALS — SYSTOLIC BLOOD PRESSURE: 142 MMHG | DIASTOLIC BLOOD PRESSURE: 53 MMHG

## 2019-04-08 VITALS — DIASTOLIC BLOOD PRESSURE: 41 MMHG | SYSTOLIC BLOOD PRESSURE: 121 MMHG

## 2019-04-08 VITALS — DIASTOLIC BLOOD PRESSURE: 46 MMHG | SYSTOLIC BLOOD PRESSURE: 91 MMHG

## 2019-04-08 VITALS — DIASTOLIC BLOOD PRESSURE: 55 MMHG | SYSTOLIC BLOOD PRESSURE: 95 MMHG

## 2019-04-08 VITALS — DIASTOLIC BLOOD PRESSURE: 54 MMHG | SYSTOLIC BLOOD PRESSURE: 127 MMHG

## 2019-04-08 VITALS — DIASTOLIC BLOOD PRESSURE: 60 MMHG | SYSTOLIC BLOOD PRESSURE: 111 MMHG

## 2019-04-08 VITALS — SYSTOLIC BLOOD PRESSURE: 110 MMHG | DIASTOLIC BLOOD PRESSURE: 45 MMHG

## 2019-04-08 VITALS — DIASTOLIC BLOOD PRESSURE: 55 MMHG | SYSTOLIC BLOOD PRESSURE: 125 MMHG

## 2019-04-08 VITALS — DIASTOLIC BLOOD PRESSURE: 42 MMHG | SYSTOLIC BLOOD PRESSURE: 109 MMHG

## 2019-04-08 VITALS — DIASTOLIC BLOOD PRESSURE: 47 MMHG | SYSTOLIC BLOOD PRESSURE: 113 MMHG

## 2019-04-08 VITALS — DIASTOLIC BLOOD PRESSURE: 54 MMHG | SYSTOLIC BLOOD PRESSURE: 96 MMHG

## 2019-04-08 VITALS — DIASTOLIC BLOOD PRESSURE: 64 MMHG | SYSTOLIC BLOOD PRESSURE: 97 MMHG

## 2019-04-08 VITALS — DIASTOLIC BLOOD PRESSURE: 57 MMHG | SYSTOLIC BLOOD PRESSURE: 111 MMHG

## 2019-04-08 VITALS — DIASTOLIC BLOOD PRESSURE: 54 MMHG | SYSTOLIC BLOOD PRESSURE: 115 MMHG

## 2019-04-08 VITALS
BODY MASS INDEX: 39.57 KG/M2 | HEIGHT: 68 IN | WEIGHT: 261.11 LBS | HEIGHT: 68 IN | BODY MASS INDEX: 39.57 KG/M2 | WEIGHT: 261.11 LBS | BODY MASS INDEX: 39.57 KG/M2

## 2019-04-08 VITALS — DIASTOLIC BLOOD PRESSURE: 70 MMHG | SYSTOLIC BLOOD PRESSURE: 146 MMHG

## 2019-04-08 VITALS — DIASTOLIC BLOOD PRESSURE: 37 MMHG | SYSTOLIC BLOOD PRESSURE: 85 MMHG

## 2019-04-08 VITALS — SYSTOLIC BLOOD PRESSURE: 104 MMHG | DIASTOLIC BLOOD PRESSURE: 73 MMHG

## 2019-04-08 VITALS — DIASTOLIC BLOOD PRESSURE: 45 MMHG | SYSTOLIC BLOOD PRESSURE: 87 MMHG

## 2019-04-08 VITALS — DIASTOLIC BLOOD PRESSURE: 54 MMHG | SYSTOLIC BLOOD PRESSURE: 130 MMHG

## 2019-04-08 VITALS — SYSTOLIC BLOOD PRESSURE: 80 MMHG | DIASTOLIC BLOOD PRESSURE: 63 MMHG

## 2019-04-08 VITALS — DIASTOLIC BLOOD PRESSURE: 54 MMHG | SYSTOLIC BLOOD PRESSURE: 108 MMHG

## 2019-04-08 VITALS — DIASTOLIC BLOOD PRESSURE: 63 MMHG | SYSTOLIC BLOOD PRESSURE: 91 MMHG

## 2019-04-08 VITALS — DIASTOLIC BLOOD PRESSURE: 66 MMHG | SYSTOLIC BLOOD PRESSURE: 129 MMHG

## 2019-04-08 VITALS — SYSTOLIC BLOOD PRESSURE: 107 MMHG | DIASTOLIC BLOOD PRESSURE: 45 MMHG

## 2019-04-08 VITALS — SYSTOLIC BLOOD PRESSURE: 107 MMHG | DIASTOLIC BLOOD PRESSURE: 51 MMHG

## 2019-04-08 VITALS — DIASTOLIC BLOOD PRESSURE: 53 MMHG | SYSTOLIC BLOOD PRESSURE: 106 MMHG

## 2019-04-08 VITALS — DIASTOLIC BLOOD PRESSURE: 48 MMHG | SYSTOLIC BLOOD PRESSURE: 110 MMHG

## 2019-04-08 VITALS — SYSTOLIC BLOOD PRESSURE: 94 MMHG | DIASTOLIC BLOOD PRESSURE: 42 MMHG

## 2019-04-08 VITALS — DIASTOLIC BLOOD PRESSURE: 61 MMHG | SYSTOLIC BLOOD PRESSURE: 82 MMHG

## 2019-04-08 VITALS — SYSTOLIC BLOOD PRESSURE: 108 MMHG | DIASTOLIC BLOOD PRESSURE: 54 MMHG

## 2019-04-08 VITALS — SYSTOLIC BLOOD PRESSURE: 103 MMHG | DIASTOLIC BLOOD PRESSURE: 52 MMHG

## 2019-04-08 VITALS — DIASTOLIC BLOOD PRESSURE: 64 MMHG | SYSTOLIC BLOOD PRESSURE: 142 MMHG

## 2019-04-08 VITALS — DIASTOLIC BLOOD PRESSURE: 43 MMHG | SYSTOLIC BLOOD PRESSURE: 89 MMHG

## 2019-04-08 VITALS — DIASTOLIC BLOOD PRESSURE: 60 MMHG | SYSTOLIC BLOOD PRESSURE: 120 MMHG

## 2019-04-08 VITALS — SYSTOLIC BLOOD PRESSURE: 107 MMHG | DIASTOLIC BLOOD PRESSURE: 72 MMHG

## 2019-04-08 VITALS — SYSTOLIC BLOOD PRESSURE: 141 MMHG | DIASTOLIC BLOOD PRESSURE: 71 MMHG

## 2019-04-08 VITALS — DIASTOLIC BLOOD PRESSURE: 57 MMHG | SYSTOLIC BLOOD PRESSURE: 106 MMHG

## 2019-04-08 VITALS — SYSTOLIC BLOOD PRESSURE: 118 MMHG | DIASTOLIC BLOOD PRESSURE: 50 MMHG

## 2019-04-08 VITALS — SYSTOLIC BLOOD PRESSURE: 74 MMHG | DIASTOLIC BLOOD PRESSURE: 40 MMHG

## 2019-04-08 VITALS — DIASTOLIC BLOOD PRESSURE: 51 MMHG | SYSTOLIC BLOOD PRESSURE: 116 MMHG

## 2019-04-08 VITALS — DIASTOLIC BLOOD PRESSURE: 56 MMHG | SYSTOLIC BLOOD PRESSURE: 81 MMHG

## 2019-04-08 VITALS — SYSTOLIC BLOOD PRESSURE: 127 MMHG | DIASTOLIC BLOOD PRESSURE: 50 MMHG

## 2019-04-08 VITALS — SYSTOLIC BLOOD PRESSURE: 95 MMHG | DIASTOLIC BLOOD PRESSURE: 74 MMHG

## 2019-04-08 VITALS — SYSTOLIC BLOOD PRESSURE: 69 MMHG | DIASTOLIC BLOOD PRESSURE: 34 MMHG

## 2019-04-08 VITALS — DIASTOLIC BLOOD PRESSURE: 31 MMHG | SYSTOLIC BLOOD PRESSURE: 111 MMHG

## 2019-04-08 VITALS — DIASTOLIC BLOOD PRESSURE: 70 MMHG | SYSTOLIC BLOOD PRESSURE: 113 MMHG

## 2019-04-08 VITALS — SYSTOLIC BLOOD PRESSURE: 126 MMHG | DIASTOLIC BLOOD PRESSURE: 57 MMHG

## 2019-04-08 VITALS — SYSTOLIC BLOOD PRESSURE: 112 MMHG | DIASTOLIC BLOOD PRESSURE: 92 MMHG

## 2019-04-08 VITALS — DIASTOLIC BLOOD PRESSURE: 66 MMHG | SYSTOLIC BLOOD PRESSURE: 98 MMHG

## 2019-04-08 VITALS — DIASTOLIC BLOOD PRESSURE: 72 MMHG | SYSTOLIC BLOOD PRESSURE: 98 MMHG

## 2019-04-08 VITALS — SYSTOLIC BLOOD PRESSURE: 139 MMHG | DIASTOLIC BLOOD PRESSURE: 55 MMHG

## 2019-04-08 VITALS — DIASTOLIC BLOOD PRESSURE: 73 MMHG | SYSTOLIC BLOOD PRESSURE: 126 MMHG

## 2019-04-08 VITALS — DIASTOLIC BLOOD PRESSURE: 40 MMHG | SYSTOLIC BLOOD PRESSURE: 87 MMHG

## 2019-04-08 VITALS — SYSTOLIC BLOOD PRESSURE: 151 MMHG | DIASTOLIC BLOOD PRESSURE: 74 MMHG

## 2019-04-08 VITALS — DIASTOLIC BLOOD PRESSURE: 39 MMHG | SYSTOLIC BLOOD PRESSURE: 72 MMHG

## 2019-04-08 VITALS — DIASTOLIC BLOOD PRESSURE: 38 MMHG | SYSTOLIC BLOOD PRESSURE: 90 MMHG

## 2019-04-08 VITALS — SYSTOLIC BLOOD PRESSURE: 104 MMHG | DIASTOLIC BLOOD PRESSURE: 53 MMHG

## 2019-04-08 VITALS — SYSTOLIC BLOOD PRESSURE: 90 MMHG | DIASTOLIC BLOOD PRESSURE: 37 MMHG

## 2019-04-08 VITALS — DIASTOLIC BLOOD PRESSURE: 45 MMHG | SYSTOLIC BLOOD PRESSURE: 120 MMHG

## 2019-04-08 VITALS — SYSTOLIC BLOOD PRESSURE: 74 MMHG | DIASTOLIC BLOOD PRESSURE: 45 MMHG

## 2019-04-08 VITALS — DIASTOLIC BLOOD PRESSURE: 32 MMHG | SYSTOLIC BLOOD PRESSURE: 90 MMHG

## 2019-04-08 VITALS — DIASTOLIC BLOOD PRESSURE: 55 MMHG | SYSTOLIC BLOOD PRESSURE: 92 MMHG

## 2019-04-08 VITALS — DIASTOLIC BLOOD PRESSURE: 42 MMHG | SYSTOLIC BLOOD PRESSURE: 106 MMHG

## 2019-04-08 VITALS — DIASTOLIC BLOOD PRESSURE: 45 MMHG | SYSTOLIC BLOOD PRESSURE: 88 MMHG

## 2019-04-08 VITALS — DIASTOLIC BLOOD PRESSURE: 39 MMHG | SYSTOLIC BLOOD PRESSURE: 111 MMHG

## 2019-04-08 VITALS — SYSTOLIC BLOOD PRESSURE: 110 MMHG | DIASTOLIC BLOOD PRESSURE: 64 MMHG

## 2019-04-08 VITALS — DIASTOLIC BLOOD PRESSURE: 55 MMHG | SYSTOLIC BLOOD PRESSURE: 117 MMHG

## 2019-04-08 VITALS — DIASTOLIC BLOOD PRESSURE: 38 MMHG | SYSTOLIC BLOOD PRESSURE: 99 MMHG

## 2019-04-08 VITALS — SYSTOLIC BLOOD PRESSURE: 99 MMHG | DIASTOLIC BLOOD PRESSURE: 44 MMHG

## 2019-04-08 VITALS — SYSTOLIC BLOOD PRESSURE: 90 MMHG | DIASTOLIC BLOOD PRESSURE: 56 MMHG

## 2019-04-08 VITALS — SYSTOLIC BLOOD PRESSURE: 109 MMHG | DIASTOLIC BLOOD PRESSURE: 42 MMHG

## 2019-04-08 VITALS — SYSTOLIC BLOOD PRESSURE: 81 MMHG | DIASTOLIC BLOOD PRESSURE: 56 MMHG

## 2019-04-08 VITALS — DIASTOLIC BLOOD PRESSURE: 40 MMHG | SYSTOLIC BLOOD PRESSURE: 79 MMHG

## 2019-04-08 VITALS — SYSTOLIC BLOOD PRESSURE: 110 MMHG | DIASTOLIC BLOOD PRESSURE: 51 MMHG

## 2019-04-08 VITALS — DIASTOLIC BLOOD PRESSURE: 71 MMHG | SYSTOLIC BLOOD PRESSURE: 119 MMHG

## 2019-04-08 VITALS — DIASTOLIC BLOOD PRESSURE: 48 MMHG | SYSTOLIC BLOOD PRESSURE: 107 MMHG

## 2019-04-08 VITALS — SYSTOLIC BLOOD PRESSURE: 114 MMHG | DIASTOLIC BLOOD PRESSURE: 50 MMHG

## 2019-04-08 VITALS — SYSTOLIC BLOOD PRESSURE: 135 MMHG | DIASTOLIC BLOOD PRESSURE: 48 MMHG

## 2019-04-08 DIAGNOSIS — E11.622: ICD-10-CM

## 2019-04-08 DIAGNOSIS — Z79.4: ICD-10-CM

## 2019-04-08 DIAGNOSIS — A41.9: Primary | ICD-10-CM

## 2019-04-08 DIAGNOSIS — N28.9: ICD-10-CM

## 2019-04-08 DIAGNOSIS — E11.10: ICD-10-CM

## 2019-04-08 DIAGNOSIS — B37.2: ICD-10-CM

## 2019-04-08 DIAGNOSIS — L05.01: ICD-10-CM

## 2019-04-08 DIAGNOSIS — L97.212: ICD-10-CM

## 2019-04-08 DIAGNOSIS — E11.00: ICD-10-CM

## 2019-04-08 LAB
ALBUMIN SERPL-MCNC: 2.5 G/DL (ref 3.4–5)
ALP SERPL-CCNC: 132 U/L (ref 46–116)
ALT SERPL-CCNC: 40 U/L (ref 10–68)
ANION GAP SERPL CALC-SCNC: 12.8 MMOL/L (ref 8–16)
ANION GAP SERPL CALC-SCNC: 13 MMOL/L (ref 8–16)
ANION GAP SERPL CALC-SCNC: 14.7 MMOL/L (ref 8–16)
ANION GAP SERPL CALC-SCNC: 15.6 MMOL/L (ref 8–16)
ANION GAP SERPL CALC-SCNC: 17.8 MMOL/L (ref 8–16)
APPEARANCE UR: CLEAR
APTT BLD: 28.7 SECONDS (ref 22.8–39.4)
BASOPHILS NFR BLD AUTO: 0.4 % (ref 0–2)
BILIRUB SERPL-MCNC: 0.15 MG/DL (ref 0.2–1.3)
BILIRUB SERPL-MCNC: NEGATIVE MG/DL
BUN SERPL-MCNC: 82 MG/DL (ref 7–18)
BUN SERPL-MCNC: 83 MG/DL (ref 7–18)
BUN SERPL-MCNC: 86 MG/DL (ref 7–18)
CALCIUM SERPL-MCNC: 7.1 MG/DL (ref 8.5–10.1)
CALCIUM SERPL-MCNC: 7.3 MG/DL (ref 8.5–10.1)
CALCIUM SERPL-MCNC: 7.4 MG/DL (ref 8.5–10.1)
CALCIUM SERPL-MCNC: 7.5 MG/DL (ref 8.5–10.1)
CALCIUM SERPL-MCNC: 7.5 MG/DL (ref 8.5–10.1)
CHLORIDE SERPL-SCNC: 100 MMOL/L (ref 98–107)
CHLORIDE SERPL-SCNC: 89 MMOL/L (ref 98–107)
CHLORIDE SERPL-SCNC: 93 MMOL/L (ref 98–107)
CHLORIDE SERPL-SCNC: 96 MMOL/L (ref 98–107)
CHLORIDE SERPL-SCNC: 98 MMOL/L (ref 98–107)
CK MB SERPL-MCNC: 22.9 U/L (ref 0–3.6)
CK SERPL-CCNC: 1119 UL (ref 21–215)
CO2 SERPL-SCNC: 22 MMOL/L (ref 21–32)
CO2 SERPL-SCNC: 22.4 MMOL/L (ref 21–32)
CO2 SERPL-SCNC: 23.2 MMOL/L (ref 21–32)
CO2 SERPL-SCNC: 24.2 MMOL/L (ref 21–32)
CO2 SERPL-SCNC: 24.5 MMOL/L (ref 21–32)
COLOR UR: YELLOW
CREAT SERPL-MCNC: 4.7 MG/DL (ref 0.6–1.3)
CREAT SERPL-MCNC: 5.1 MG/DL (ref 0.6–1.3)
CREAT SERPL-MCNC: 5.2 MG/DL (ref 0.6–1.3)
EOSINOPHIL NFR BLD: 2.3 % (ref 0–7)
ERYTHROCYTE [DISTWIDTH] IN BLOOD BY AUTOMATED COUNT: 13.9 % (ref 11.5–14.5)
GLOBULIN SER-MCNC: 3.8 G/L
GLUCOSE SERPL-MCNC: 229 MG/DL (ref 74–106)
GLUCOSE SERPL-MCNC: 393 MG/DL (ref 74–106)
GLUCOSE SERPL-MCNC: 428 MG/DL (ref 74–106)
GLUCOSE SERPL-MCNC: 500 MG/DL
GLUCOSE SERPL-MCNC: 532 MG/DL (ref 74–106)
GLUCOSE SERPL-MCNC: 625 MG/DL (ref 74–106)
HCT VFR BLD CALC: 31.2 % (ref 36–48)
HGB BLD-MCNC: 10.3 G/DL (ref 12–16)
IMM GRANULOCYTES NFR BLD: 0.3 % (ref 0–5)
INR PPP: 1.03 (ref 0.85–1.17)
KETONES UR STRIP-MCNC: NEGATIVE MG/DL
LYMPHOCYTES NFR BLD AUTO: 22.5 % (ref 15–50)
MAGNESIUM SERPL-MCNC: 2.3 MG/DL (ref 1.8–2.4)
MAGNESIUM SERPL-MCNC: 2.4 MG/DL (ref 1.8–2.4)
MAGNESIUM SERPL-MCNC: 2.6 MG/DL (ref 1.8–2.4)
MCH RBC QN AUTO: 30.6 PG (ref 26–34)
MCHC RBC AUTO-ENTMCNC: 33 G/DL (ref 31–37)
MCV RBC: 92.6 FL (ref 80–100)
MONOCYTES NFR BLD: 10.3 % (ref 2–11)
NEUTROPHILS NFR BLD AUTO: 64.2 % (ref 40–80)
NITRITE UR-MCNC: NEGATIVE MG/ML
OSMOLALITY SERPL CALC.SUM OF ELEC: 294 MOSM/KG (ref 275–300)
OSMOLALITY SERPL CALC.SUM OF ELEC: 300 MOSM/KG (ref 275–300)
OSMOLALITY SERPL CALC.SUM OF ELEC: 301 MOSM/KG (ref 275–300)
OSMOLALITY SERPL CALC.SUM OF ELEC: 302 MOSM/KG (ref 275–300)
OSMOLALITY SERPL CALC.SUM OF ELEC: 307 MOSM/KG (ref 275–300)
PH UR STRIP: 5 [PH] (ref 5–6)
PLATELET # BLD: 239 10X3/UL (ref 130–400)
PMV BLD AUTO: 10 FL (ref 7.4–10.4)
POTASSIUM SERPL-SCNC: 4.9 MMOL/L (ref 3.5–5.1)
POTASSIUM SERPL-SCNC: 5 MMOL/L (ref 3.5–5.1)
POTASSIUM SERPL-SCNC: 5 MMOL/L (ref 3.5–5.1)
POTASSIUM SERPL-SCNC: 5.5 MMOL/L (ref 3.5–5.1)
POTASSIUM SERPL-SCNC: 5.8 MMOL/L (ref 3.5–5.1)
PROT SERPL-MCNC: 6.3 G/DL (ref 6.4–8.2)
PROT UR-MCNC: NEGATIVE MG/DL
PROTHROMBIN TIME: 13 SECONDS (ref 11.6–15)
RBC # BLD AUTO: 3.37 10X6/UL (ref 4–5.4)
SODIUM SERPL-SCNC: 123 MMOL/L (ref 136–145)
SODIUM SERPL-SCNC: 125 MMOL/L (ref 136–145)
SODIUM SERPL-SCNC: 129 MMOL/L (ref 136–145)
SODIUM SERPL-SCNC: 131 MMOL/L (ref 136–145)
SODIUM SERPL-SCNC: 132 MMOL/L (ref 136–145)
SP GR UR STRIP: 1.02 (ref 1–1.02)
TROPONIN I SERPL-MCNC: < 0.017 NG/ML (ref 0–0.06)
UROBILINOGEN UR-MCNC: NORMAL MG/DL
WBC # BLD AUTO: 9 10X3/UL (ref 4.8–10.8)

## 2019-04-08 PROCEDURE — 0HBKXZZ EXCISION OF RIGHT LOWER LEG SKIN, EXTERNAL APPROACH: ICD-10-PCS | Performed by: PODIATRIST

## 2019-04-09 VITALS — SYSTOLIC BLOOD PRESSURE: 82 MMHG | DIASTOLIC BLOOD PRESSURE: 41 MMHG

## 2019-04-09 VITALS — SYSTOLIC BLOOD PRESSURE: 92 MMHG | DIASTOLIC BLOOD PRESSURE: 45 MMHG

## 2019-04-09 VITALS — SYSTOLIC BLOOD PRESSURE: 120 MMHG | DIASTOLIC BLOOD PRESSURE: 53 MMHG

## 2019-04-09 VITALS — DIASTOLIC BLOOD PRESSURE: 59 MMHG | SYSTOLIC BLOOD PRESSURE: 107 MMHG

## 2019-04-09 VITALS — DIASTOLIC BLOOD PRESSURE: 56 MMHG | SYSTOLIC BLOOD PRESSURE: 81 MMHG

## 2019-04-09 VITALS — DIASTOLIC BLOOD PRESSURE: 48 MMHG | SYSTOLIC BLOOD PRESSURE: 119 MMHG

## 2019-04-09 VITALS — SYSTOLIC BLOOD PRESSURE: 99 MMHG | DIASTOLIC BLOOD PRESSURE: 49 MMHG

## 2019-04-09 VITALS — SYSTOLIC BLOOD PRESSURE: 74 MMHG | DIASTOLIC BLOOD PRESSURE: 44 MMHG

## 2019-04-09 VITALS — DIASTOLIC BLOOD PRESSURE: 50 MMHG | SYSTOLIC BLOOD PRESSURE: 101 MMHG

## 2019-04-09 VITALS — DIASTOLIC BLOOD PRESSURE: 50 MMHG | SYSTOLIC BLOOD PRESSURE: 108 MMHG

## 2019-04-09 VITALS — DIASTOLIC BLOOD PRESSURE: 69 MMHG | SYSTOLIC BLOOD PRESSURE: 126 MMHG

## 2019-04-09 VITALS — SYSTOLIC BLOOD PRESSURE: 88 MMHG | DIASTOLIC BLOOD PRESSURE: 33 MMHG

## 2019-04-09 VITALS — SYSTOLIC BLOOD PRESSURE: 104 MMHG | DIASTOLIC BLOOD PRESSURE: 60 MMHG

## 2019-04-09 VITALS — DIASTOLIC BLOOD PRESSURE: 62 MMHG | SYSTOLIC BLOOD PRESSURE: 112 MMHG

## 2019-04-09 VITALS — DIASTOLIC BLOOD PRESSURE: 58 MMHG | SYSTOLIC BLOOD PRESSURE: 136 MMHG

## 2019-04-09 VITALS — SYSTOLIC BLOOD PRESSURE: 81 MMHG | DIASTOLIC BLOOD PRESSURE: 60 MMHG

## 2019-04-09 VITALS — DIASTOLIC BLOOD PRESSURE: 45 MMHG | SYSTOLIC BLOOD PRESSURE: 97 MMHG

## 2019-04-09 VITALS — SYSTOLIC BLOOD PRESSURE: 15 MMHG | DIASTOLIC BLOOD PRESSURE: 50 MMHG

## 2019-04-09 VITALS — DIASTOLIC BLOOD PRESSURE: 58 MMHG | SYSTOLIC BLOOD PRESSURE: 77 MMHG

## 2019-04-09 VITALS — SYSTOLIC BLOOD PRESSURE: 102 MMHG | DIASTOLIC BLOOD PRESSURE: 32 MMHG

## 2019-04-09 VITALS — DIASTOLIC BLOOD PRESSURE: 56 MMHG | SYSTOLIC BLOOD PRESSURE: 109 MMHG

## 2019-04-09 VITALS — DIASTOLIC BLOOD PRESSURE: 49 MMHG | SYSTOLIC BLOOD PRESSURE: 81 MMHG

## 2019-04-09 VITALS — SYSTOLIC BLOOD PRESSURE: 110 MMHG | DIASTOLIC BLOOD PRESSURE: 41 MMHG

## 2019-04-09 VITALS — SYSTOLIC BLOOD PRESSURE: 100 MMHG | DIASTOLIC BLOOD PRESSURE: 43 MMHG

## 2019-04-09 VITALS — SYSTOLIC BLOOD PRESSURE: 81 MMHG | DIASTOLIC BLOOD PRESSURE: 53 MMHG

## 2019-04-09 VITALS — DIASTOLIC BLOOD PRESSURE: 56 MMHG | SYSTOLIC BLOOD PRESSURE: 110 MMHG

## 2019-04-09 VITALS — DIASTOLIC BLOOD PRESSURE: 51 MMHG | SYSTOLIC BLOOD PRESSURE: 90 MMHG

## 2019-04-09 VITALS — SYSTOLIC BLOOD PRESSURE: 100 MMHG | DIASTOLIC BLOOD PRESSURE: 46 MMHG

## 2019-04-09 VITALS — SYSTOLIC BLOOD PRESSURE: 109 MMHG | DIASTOLIC BLOOD PRESSURE: 58 MMHG

## 2019-04-09 VITALS — SYSTOLIC BLOOD PRESSURE: 109 MMHG | DIASTOLIC BLOOD PRESSURE: 53 MMHG

## 2019-04-09 VITALS — SYSTOLIC BLOOD PRESSURE: 63 MMHG | DIASTOLIC BLOOD PRESSURE: 28 MMHG

## 2019-04-09 VITALS — SYSTOLIC BLOOD PRESSURE: 130 MMHG | DIASTOLIC BLOOD PRESSURE: 58 MMHG

## 2019-04-09 VITALS — DIASTOLIC BLOOD PRESSURE: 56 MMHG | SYSTOLIC BLOOD PRESSURE: 94 MMHG

## 2019-04-09 VITALS — DIASTOLIC BLOOD PRESSURE: 58 MMHG | SYSTOLIC BLOOD PRESSURE: 112 MMHG

## 2019-04-09 VITALS — SYSTOLIC BLOOD PRESSURE: 95 MMHG | DIASTOLIC BLOOD PRESSURE: 72 MMHG

## 2019-04-09 LAB
ALBUMIN SERPL-MCNC: 2.3 G/DL (ref 3.4–5)
ALP SERPL-CCNC: 115 U/L (ref 46–116)
ALT SERPL-CCNC: 33 U/L (ref 10–68)
ANION GAP SERPL CALC-SCNC: 13.9 MMOL/L (ref 8–16)
ANION GAP SERPL CALC-SCNC: 15.2 MMOL/L (ref 8–16)
BILIRUB SERPL-MCNC: 0.22 MG/DL (ref 0.2–1.3)
BUN SERPL-MCNC: 77 MG/DL (ref 7–18)
BUN SERPL-MCNC: 80 MG/DL (ref 7–18)
CALCIUM SERPL-MCNC: 7.2 MG/DL (ref 8.5–10.1)
CALCIUM SERPL-MCNC: 7.4 MG/DL (ref 8.5–10.1)
CHLORIDE SERPL-SCNC: 102 MMOL/L (ref 98–107)
CHLORIDE SERPL-SCNC: 103 MMOL/L (ref 98–107)
CO2 SERPL-SCNC: 21.8 MMOL/L (ref 21–32)
CO2 SERPL-SCNC: 22.4 MMOL/L (ref 21–32)
CREAT SERPL-MCNC: 3.9 MG/DL (ref 0.6–1.3)
CREAT SERPL-MCNC: 4.3 MG/DL (ref 0.6–1.3)
ERYTHROCYTE [DISTWIDTH] IN BLOOD BY AUTOMATED COUNT: 13.8 % (ref 11.5–14.5)
GLOBULIN SER-MCNC: 3.9 G/L
GLUCOSE SERPL-MCNC: 154 MG/DL (ref 74–106)
GLUCOSE SERPL-MCNC: 97 MG/DL (ref 74–106)
HCT VFR BLD CALC: 32.7 % (ref 36–48)
HGB BLD-MCNC: 10.8 G/DL (ref 12–16)
LYMPHOCYTES NFR BLD AUTO: 20 % (ref 15–50)
MAGNESIUM SERPL-MCNC: 2.3 MG/DL (ref 1.8–2.4)
MCH RBC QN AUTO: 30.3 PG (ref 26–34)
MCHC RBC AUTO-ENTMCNC: 33 G/DL (ref 31–37)
MCV RBC: 91.9 FL (ref 80–100)
MONOCYTES NFR BLD: 9 % (ref 2–11)
NEUTROPHILS NFR BLD AUTO: 71 % (ref 40–80)
OSMOLALITY SERPL CALC.SUM OF ELEC: 292 MOSM/KG (ref 275–300)
OSMOLALITY SERPL CALC.SUM OF ELEC: 292 MOSM/KG (ref 275–300)
PLAT MORPH BLD: (no result)
PLATELET # BLD EST: NORMAL 10*3/UL
PLATELET # BLD: 251 10X3/UL (ref 130–400)
PMV BLD AUTO: 9.6 FL (ref 7.4–10.4)
POTASSIUM SERPL-SCNC: 5 MMOL/L (ref 3.5–5.1)
POTASSIUM SERPL-SCNC: 5.3 MMOL/L (ref 3.5–5.1)
PROT SERPL-MCNC: 6.2 G/DL (ref 6.4–8.2)
RBC # BLD AUTO: 3.56 10X6/UL (ref 4–5.4)
SODIUM SERPL-SCNC: 133 MMOL/L (ref 136–145)
SODIUM SERPL-SCNC: 135 MMOL/L (ref 136–145)
WBC # BLD AUTO: 8.9 10X3/UL (ref 4.8–10.8)

## 2019-04-10 VITALS — DIASTOLIC BLOOD PRESSURE: 54 MMHG | SYSTOLIC BLOOD PRESSURE: 139 MMHG

## 2019-04-10 VITALS — DIASTOLIC BLOOD PRESSURE: 66 MMHG | SYSTOLIC BLOOD PRESSURE: 134 MMHG

## 2019-04-10 VITALS — SYSTOLIC BLOOD PRESSURE: 132 MMHG | DIASTOLIC BLOOD PRESSURE: 55 MMHG

## 2019-04-10 VITALS — DIASTOLIC BLOOD PRESSURE: 50 MMHG | SYSTOLIC BLOOD PRESSURE: 111 MMHG

## 2019-04-10 VITALS — SYSTOLIC BLOOD PRESSURE: 145 MMHG | DIASTOLIC BLOOD PRESSURE: 47 MMHG

## 2019-04-10 LAB
ALBUMIN SERPL-MCNC: 2.3 G/DL (ref 3.4–5)
ALP SERPL-CCNC: 108 U/L (ref 46–116)
ALT SERPL-CCNC: 30 U/L (ref 10–68)
ANION GAP SERPL CALC-SCNC: 11.8 MMOL/L (ref 8–16)
BASOPHILS NFR BLD AUTO: 0.2 % (ref 0–2)
BILIRUB SERPL-MCNC: 0.26 MG/DL (ref 0.2–1.3)
BUN SERPL-MCNC: 52 MG/DL (ref 7–18)
CALCIUM SERPL-MCNC: 8 MG/DL (ref 8.5–10.1)
CHLORIDE SERPL-SCNC: 105 MMOL/L (ref 98–107)
CO2 SERPL-SCNC: 24.1 MMOL/L (ref 21–32)
CREAT SERPL-MCNC: 2 MG/DL (ref 0.6–1.3)
EOSINOPHIL NFR BLD: 0.8 % (ref 0–7)
ERYTHROCYTE [DISTWIDTH] IN BLOOD BY AUTOMATED COUNT: 14.4 % (ref 11.5–14.5)
GLOBULIN SER-MCNC: 3.8 G/L
GLUCOSE SERPL-MCNC: 174 MG/DL (ref 74–106)
HCT VFR BLD CALC: 28.9 % (ref 36–48)
HGB BLD-MCNC: 9.3 G/DL (ref 12–16)
IMM GRANULOCYTES NFR BLD: 0 % (ref 0–5)
LYMPHOCYTES NFR BLD AUTO: 32.5 % (ref 15–50)
MCH RBC QN AUTO: 29.8 PG (ref 26–34)
MCHC RBC AUTO-ENTMCNC: 32.2 G/DL (ref 31–37)
MCV RBC: 92.6 FL (ref 80–100)
MONOCYTES NFR BLD: 10.4 % (ref 2–11)
NEUTROPHILS NFR BLD AUTO: 56.1 % (ref 40–80)
OSMOLALITY SERPL CALC.SUM OF ELEC: 289 MOSM/KG (ref 275–300)
PLATELET # BLD: 222 10X3/UL (ref 130–400)
PMV BLD AUTO: 9.8 FL (ref 7.4–10.4)
POTASSIUM SERPL-SCNC: 4.9 MMOL/L (ref 3.5–5.1)
PROT SERPL-MCNC: 6.1 G/DL (ref 6.4–8.2)
RBC # BLD AUTO: 3.12 10X6/UL (ref 4–5.4)
SODIUM SERPL-SCNC: 136 MMOL/L (ref 136–145)
WBC # BLD AUTO: 6.4 10X3/UL (ref 4.8–10.8)

## 2019-04-10 NOTE — MORECARE
CASE MANAGEMENT DISCHARGE SUMMARY
 
 
PATIENT: MIREYA BENOIT                  UNIT: [id number]
ACCOUNT#: [id number]                       ADM DATE: 19
AGE: 58     : [birth year]  SEX: F            ROOM/BED: D.1202    
AUTHOR: TEO FIELDS                             PHYSICIAN:                               
 
REFERRING PHYSICIAN: JOAQUINA BARFIELD MD    
DATE OF SERVICE: 04/10/19
Discharge Plan
 
 
Patient Name: MIREYA BENOIT
Facility: [facility]
Encounter #: [id number]
Medical Record #: [id number]
: 1960
Planned Disposition: Home
Anticipated Discharge Date: 
 
Discharge Date: 
Expected LOS: 
Initial Reviewer: [id number]
Initial Review Date: 2019
Generated: 4/10/19   2:18 pm 
  
 
 
 
 
 
 
 
Patient Name: MIREYA BENOIT
 
Encounter #: [id number]
Page [id number]
 
 
 
 
 
Electronically Signed by TEO FIELDS on 04/10/19 at 1318
 
 
 
 
 
 
**All edits/amendments must be made on the electronic document**
 
DICTATION DATE: 04/10/19 1317     : ROSALES  04/10/19 1317     
RPT#: [id number]                                DC DATE:        
                                               STATUS: ADM IN  
[facility]
[address] [place] [address]
***END OF REPORT***

## 2019-04-10 NOTE — MORECARE
CASE MANAGEMENT DISCHARGE SUMMARY
 
 
PATIENT: MIREYA BENOIT                  UNIT: [id number]
ACCOUNT#: [id number]                       ADM DATE: 19
AGE: 58     : [birth year]  SEX: F            ROOM/BED: D.1202    
AUTHOR: DIAMOND,DOC                             PHYSICIAN:                               
 
REFERRING PHYSICIAN: JOAQUINA BARFIELD MD    
DATE OF SERVICE: 04/10/19
Discharge Plan
 
 
Patient Name: MIREYA BENOIT
Facility: [facility]:[place]
Encounter #: [id number]
Medical Record #: [id number]
: 1960
Planned Disposition: Home
Anticipated Discharge Date: 
 
Discharge Date: 
Expected LOS: 
Initial Reviewer: [id number]
Initial Review Date: 2019
Generated: 4/10/19   2:26 pm 
Comments
 
DCP- Discharge Planning
 
Updated by [id number]: Emili Louise on 4/10/19  12:23 pm CT
LATE ENTRY  19 @ 1230  
  
Patient Name: MIREYA BENOIT                                     
Admission Status: ER   
Accout number: [id number]                              
Admission Date: 2019   
: 1960                                                        
Admission Diagnosis:   
Attending: JOAQUINA BARFIELD                                                
Current LOS:  2   
  
Anticipated DC Date:    
Planned Disposition: Home   
Primary Insurance: [employer]   
  
  
Discharge Planning Comments: CM met with patient at bedside after explaining 
CM role and obtaining verbal consent. Patient lives at home with her 19 yr 
old daughter and plans to return there upon discharge.  Patient feels this 
 
would be a safe discharge.  CM discussed availability / needs of home health 
and medical equipment. Patient states she has a CPAP, walker and a cane. 
Patient also states that she has 10 steps going into her home.  Patient 
denies any discharge needs at this time. Patient states he will have her 
family drive her home upon discharge.  CM will continue to follow and assist 
as needed with discharge planning / needs.  
  
  
  
  
  
  
: Emili Louise
 DCPIA - Discharge Planning Initial Assessment
 
Updated by [email or web address]: Emili Louise on 4/10/19   1:18 pm
*  Is the patient Alert and Oriented?
Yes
*  How many steps to enter\exit or inside your home? 10 *  PCP [facility] *  Pharmacy
[facility]
*  Preadmission Environment
Home with Family
*  ADLs
Independent
*  Other Equipment
CPAP, WALKER, CANE
*  List name and contact numbers for known caregivers / representatives who 
currently or will assist patient after discharge:
NERY PATEL [phone], [phone] SON AND DAUGHTER-N-LAW
*  Verbal permission to speak to the caregivers and representatives has been 
obtained from the patient.
Yes
*  Community resources currently utilized
None
*  Additional services required to return to the preadmission environment?
No
*  Can the patient safely return to the preadmission environment?
Yes
*  Has this patient been hospitalized within the prior 30 days at any 
hospital?
No
 
 
 
 
 
 
 
Last DP export: 4/10/19  12:18 p
Patient Name: MIREYA BENOIT
 
Encounter #: [id number]
Page [id number]
 
 
 
 
 
Electronically Signed by TEO FIELDS on 04/10/19 at 1327
 
 
 
 
 
 
**All edits/amendments must be made on the electronic document**
 
DICTATION DATE: 04/10/19 1326     : ROSALES  04/10/19 1326     
RPT#: [id number]                                DC DATE:        
                                               STATUS: ADM IN  
[facility]
 [place] [address]
***END OF REPORT***

## 2019-04-11 VITALS — SYSTOLIC BLOOD PRESSURE: 121 MMHG | DIASTOLIC BLOOD PRESSURE: 43 MMHG

## 2019-04-11 VITALS — SYSTOLIC BLOOD PRESSURE: 131 MMHG | DIASTOLIC BLOOD PRESSURE: 51 MMHG

## 2019-04-11 VITALS — SYSTOLIC BLOOD PRESSURE: 150 MMHG | DIASTOLIC BLOOD PRESSURE: 61 MMHG

## 2019-04-11 VITALS — DIASTOLIC BLOOD PRESSURE: 50 MMHG | SYSTOLIC BLOOD PRESSURE: 117 MMHG

## 2019-04-11 VITALS — SYSTOLIC BLOOD PRESSURE: 142 MMHG | DIASTOLIC BLOOD PRESSURE: 46 MMHG

## 2019-04-11 VITALS — DIASTOLIC BLOOD PRESSURE: 63 MMHG | SYSTOLIC BLOOD PRESSURE: 142 MMHG

## 2019-04-11 LAB
ALBUMIN SERPL-MCNC: 2.3 G/DL (ref 3.4–5)
ALP SERPL-CCNC: 98 U/L (ref 46–116)
ALT SERPL-CCNC: 29 U/L (ref 10–68)
ANION GAP SERPL CALC-SCNC: 10.3 MMOL/L (ref 8–16)
BASOPHILS NFR BLD AUTO: 0.2 % (ref 0–2)
BILIRUB SERPL-MCNC: 0.29 MG/DL (ref 0.2–1.3)
BUN SERPL-MCNC: 37 MG/DL (ref 7–18)
CALCIUM SERPL-MCNC: 8.3 MG/DL (ref 8.5–10.1)
CHLORIDE SERPL-SCNC: 108 MMOL/L (ref 98–107)
CO2 SERPL-SCNC: 26.5 MMOL/L (ref 21–32)
CREAT SERPL-MCNC: 1.4 MG/DL (ref 0.6–1.3)
EOSINOPHIL NFR BLD: 1.4 % (ref 0–7)
ERYTHROCYTE [DISTWIDTH] IN BLOOD BY AUTOMATED COUNT: 14.1 % (ref 11.5–14.5)
GLOBULIN SER-MCNC: 3.7 G/L
GLUCOSE SERPL-MCNC: 108 MG/DL (ref 74–106)
HCT VFR BLD CALC: 28.8 % (ref 36–48)
HGB BLD-MCNC: 9.4 G/DL (ref 12–16)
IMM GRANULOCYTES NFR BLD: 0.2 % (ref 0–5)
LYMPHOCYTES NFR BLD AUTO: 36.2 % (ref 15–50)
MCH RBC QN AUTO: 29.8 PG (ref 26–34)
MCHC RBC AUTO-ENTMCNC: 32.6 G/DL (ref 31–37)
MCV RBC: 91.4 FL (ref 80–100)
MONOCYTES NFR BLD: 9.6 % (ref 2–11)
NEUTROPHILS NFR BLD AUTO: 52.4 % (ref 40–80)
OSMOLALITY SERPL CALC.SUM OF ELEC: 288 MOSM/KG (ref 275–300)
PLATELET # BLD: 232 10X3/UL (ref 130–400)
PMV BLD AUTO: 9.3 FL (ref 7.4–10.4)
POTASSIUM SERPL-SCNC: 4.8 MMOL/L (ref 3.5–5.1)
PROT SERPL-MCNC: 6 G/DL (ref 6.4–8.2)
RBC # BLD AUTO: 3.15 10X6/UL (ref 4–5.4)
SODIUM SERPL-SCNC: 140 MMOL/L (ref 136–145)
WBC # BLD AUTO: 6.2 10X3/UL (ref 4.8–10.8)

## 2019-04-12 VITALS — DIASTOLIC BLOOD PRESSURE: 63 MMHG | SYSTOLIC BLOOD PRESSURE: 161 MMHG

## 2019-04-12 VITALS — SYSTOLIC BLOOD PRESSURE: 148 MMHG | DIASTOLIC BLOOD PRESSURE: 55 MMHG

## 2019-04-12 VITALS — SYSTOLIC BLOOD PRESSURE: 149 MMHG | DIASTOLIC BLOOD PRESSURE: 72 MMHG

## 2019-04-12 VITALS — SYSTOLIC BLOOD PRESSURE: 136 MMHG | DIASTOLIC BLOOD PRESSURE: 57 MMHG

## 2019-04-12 LAB
ALBUMIN SERPL-MCNC: 2.4 G/DL (ref 3.4–5)
ALP SERPL-CCNC: 101 U/L (ref 46–116)
ALT SERPL-CCNC: 34 U/L (ref 10–68)
ANION GAP SERPL CALC-SCNC: 15.3 MMOL/L (ref 8–16)
BASOPHILS NFR BLD AUTO: 0.2 % (ref 0–2)
BILIRUB SERPL-MCNC: 0.23 MG/DL (ref 0.2–1.3)
BUN SERPL-MCNC: 26 MG/DL (ref 7–18)
CALCIUM SERPL-MCNC: 8.2 MG/DL (ref 8.5–10.1)
CHLORIDE SERPL-SCNC: 107 MMOL/L (ref 98–107)
CO2 SERPL-SCNC: 23.4 MMOL/L (ref 21–32)
CREAT SERPL-MCNC: 1.2 MG/DL (ref 0.6–1.3)
EOSINOPHIL NFR BLD: 1.4 % (ref 0–7)
ERYTHROCYTE [DISTWIDTH] IN BLOOD BY AUTOMATED COUNT: 14.2 % (ref 11.5–14.5)
GLOBULIN SER-MCNC: 4 G/L
GLUCOSE SERPL-MCNC: 90 MG/DL (ref 74–106)
HCT VFR BLD CALC: 31.5 % (ref 36–48)
HGB BLD-MCNC: 10.2 G/DL (ref 12–16)
IMM GRANULOCYTES NFR BLD: 0.3 % (ref 0–5)
LYMPHOCYTES NFR BLD AUTO: 42.5 % (ref 15–50)
MCH RBC QN AUTO: 29.9 PG (ref 26–34)
MCHC RBC AUTO-ENTMCNC: 32.4 G/DL (ref 31–37)
MCV RBC: 92.4 FL (ref 80–100)
MONOCYTES NFR BLD: 8.5 % (ref 2–11)
NEUTROPHILS NFR BLD AUTO: 47.1 % (ref 40–80)
OSMOLALITY SERPL CALC.SUM OF ELEC: 285 MOSM/KG (ref 275–300)
PLATELET # BLD: 280 10X3/UL (ref 130–400)
PMV BLD AUTO: 9.4 FL (ref 7.4–10.4)
POTASSIUM SERPL-SCNC: 4.7 MMOL/L (ref 3.5–5.1)
PROT SERPL-MCNC: 6.4 G/DL (ref 6.4–8.2)
RBC # BLD AUTO: 3.41 10X6/UL (ref 4–5.4)
SODIUM SERPL-SCNC: 141 MMOL/L (ref 136–145)
WBC # BLD AUTO: 9.5 10X3/UL (ref 4.8–10.8)

## 2019-04-13 VITALS — DIASTOLIC BLOOD PRESSURE: 63 MMHG | SYSTOLIC BLOOD PRESSURE: 126 MMHG

## 2019-04-13 VITALS — SYSTOLIC BLOOD PRESSURE: 157 MMHG | DIASTOLIC BLOOD PRESSURE: 49 MMHG

## 2019-04-13 VITALS — DIASTOLIC BLOOD PRESSURE: 88 MMHG | SYSTOLIC BLOOD PRESSURE: 129 MMHG

## 2019-04-13 VITALS — DIASTOLIC BLOOD PRESSURE: 56 MMHG | SYSTOLIC BLOOD PRESSURE: 148 MMHG

## 2019-04-13 LAB
ALBUMIN SERPL-MCNC: 2.3 G/DL (ref 3.4–5)
ALP SERPL-CCNC: 89 U/L (ref 46–116)
ALT SERPL-CCNC: 32 U/L (ref 10–68)
ANION GAP SERPL CALC-SCNC: 10.8 MMOL/L (ref 8–16)
BASOPHILS NFR BLD AUTO: 0.1 % (ref 0–2)
BILIRUB SERPL-MCNC: 0.18 MG/DL (ref 0.2–1.3)
BUN SERPL-MCNC: 20 MG/DL (ref 7–18)
CALCIUM SERPL-MCNC: 8.3 MG/DL (ref 8.5–10.1)
CHLORIDE SERPL-SCNC: 110 MMOL/L (ref 98–107)
CO2 SERPL-SCNC: 28 MMOL/L (ref 21–32)
CREAT SERPL-MCNC: 1.1 MG/DL (ref 0.6–1.3)
EOSINOPHIL NFR BLD: 1 % (ref 0–7)
ERYTHROCYTE [DISTWIDTH] IN BLOOD BY AUTOMATED COUNT: 13.8 % (ref 11.5–14.5)
GLOBULIN SER-MCNC: 3.6 G/L
GLUCOSE SERPL-MCNC: 93 MG/DL (ref 74–106)
HCT VFR BLD CALC: 28.4 % (ref 36–48)
HGB BLD-MCNC: 9.2 G/DL (ref 12–16)
IMM GRANULOCYTES NFR BLD: 0.4 % (ref 0–5)
LYMPHOCYTES NFR BLD AUTO: 41.4 % (ref 15–50)
MCH RBC QN AUTO: 30 PG (ref 26–34)
MCHC RBC AUTO-ENTMCNC: 32.4 G/DL (ref 31–37)
MCV RBC: 92.5 FL (ref 80–100)
MONOCYTES NFR BLD: 8.2 % (ref 2–11)
NEUTROPHILS NFR BLD AUTO: 48.9 % (ref 40–80)
OSMOLALITY SERPL CALC.SUM OF ELEC: 289 MOSM/KG (ref 275–300)
PLATELET # BLD: 230 10X3/UL (ref 130–400)
PMV BLD AUTO: 9.2 FL (ref 7.4–10.4)
POTASSIUM SERPL-SCNC: 4.8 MMOL/L (ref 3.5–5.1)
PROT SERPL-MCNC: 5.9 G/DL (ref 6.4–8.2)
RBC # BLD AUTO: 3.07 10X6/UL (ref 4–5.4)
SODIUM SERPL-SCNC: 144 MMOL/L (ref 136–145)
WBC # BLD AUTO: 7.8 10X3/UL (ref 4.8–10.8)

## 2019-04-13 NOTE — MORECARE
CASE MANAGEMENT DISCHARGE SUMMARY
 
 
PATIENT: MIREYA BENOIT                  UNIT: [id number]
ACCOUNT#: [id number]                       ADM DATE: 19
AGE: 58     : 64  SEX: F            ROOM/BED: D.1202    
AUTHOR: DIAMOND,DOC                             PHYSICIAN:                               
 
REFERRING PHYSICIAN: JOAQUINA BARFIELD MD    
DATE OF SERVICE: 19
Discharge Plan
 
 
Patient Name: MIREYA BENOIT
Facility: [facility]:[place]
Encounter #: [id number]
Medical Record #: [id number]
: 1960
Planned Disposition: Home
Anticipated Discharge Date: 19
 
Discharge Date: 
Expected LOS: 5
Initial Reviewer: [id number]
Initial Review Date: 2019
Generated: 19   5:18 pm 
Comments
 
DCP- Discharge Planning
 
Updated by [email or web address]: Arianna Kearns on 19   3:12 pm CT
CM RECEIVED A TELEPHONE CALL FROM CARLY , THAT PATIENT WAS 
BEING DISCHARGED TO HOME W/ HOME HEALTH. MD STATED SHE HAD HOME HEALTH 
HOWEVER PATIENT STATED THEY NEVER CONTACTED HER.  
TC TO [facility]- SPOKE W/ ON CALL. RAUL - NO REFERRAL  
DIXIE HAD SEEN THE PATIENT PREVIOUSLY AND SHE WAS DISCHARGED FROM SERVICE IN 
JANUARY.  
TC TO [facility] - SPOKE W/ ON CALL, ANAIS - NO REFERRAL.  
ME[facility] VISITED W/ THE PATIENT. DISCUSSED HOME HEALTH. PROVIDED THE HOME HEALTH 
PROVIDERS LIST. [facility] SINCE SHE HAD THEM PREVIOUSLY.  
[facility] THE VISIT WILL BE MONDAY.  
PATIENT CHOICE FORM SIGNED. ORIGINAL COPY TO THE PATIENT. ORIGINAL COPY TO 
HARD COVER CHART.  
DISCHARGE IMM EXPLAINED. SIGNATURE OBTAINED. COPY TO THE PATIENT. SIGNED COPY 
TO  
THE HARD COVER CHART.  
ADVISED  OF PATIENT CHOICE FOR H/H.  
ADVISED PATIENT THE VISIT WOULD BE ON MONDAY.  
SPOKE WITH PRIMARY NURSE, BRIAN. REQUESTED SHE SEND DRESSINGS FOR 4 DAYS OF 
CHANGES.
 
DCP- Discharge Planning
 
Updated by [id number]: Emili Louise on 4/10/19  12:23 pm CT
LATE ENTRY  19 @ 1230  
  
Patient Name: MIREYA BENOIT                                     
Admission Status: ER   
Accout number: [id number]                              
Admission Date: 2019   
: 1960                                                        
Admission Diagnosis:   
Attending: JOAQUINA BARFIELD                                                
Current LOS:  2   
  
Anticipated DC Date:    
Planned Disposition: Home   
Primary Insurance: [employer]   
  
  
Discharge Planning Comments: CM met with patient at bedside after explaining 
CM role and obtaining verbal consent. Patient lives at home with her 19 yr 
old daughter and plans to return there upon discharge.  Patient feels this 
would be a safe discharge.  CM discussed availability / needs of home health 
and medical equipment. Patient states she has a CPAP, walker and a cane. 
Patient also states that she has 10 steps going into her home.  Patient 
denies any discharge needs at this time. Patient states he will have her 
family drive her home upon discharge.  CM will continue to follow and assist 
as needed with discharge planning / needs.  
  
  
  
  
  
  
: Emili Louise
 DCPIA - Discharge Planning Initial Assessment
 
Updated by [id number]: Emili Louise on 4/10/19   1:18 pm
*  Is the patient Alert and Oriented?
Yes
*  How many steps to enter\exit or inside your home? 10 *  [facility] *  Pharmacy
[facility]
*  Preadmission Environment
Home with Family
*  ADLs
Independent
*  Other Equipment
CPAP, WALKER, CANE
*  List name and contact numbers for known caregivers / representatives who 
currently or will assist patient after discharge:
NERY PATEL [phone], [phone] SON AND DAUGHTER-N-LAW
*  Verbal permission to speak to the caregivers and representatives has been 
 
obtained from the patient.
Yes
*  Community resources currently utilized
None
*  Additional services required to return to the preadmission environment?
No
*  Can the patient safely return to the preadmission environment?
Yes
*  Has this patient been hospitalized within the prior 30 days at any 
hospital?
No
 
 
 
 
 
Coverage Notice
 
Reviewer: [email or web address] Obdulio Kearns
 
Notice Issued Date-Time: 2019  15:55
Notice Type: IM Discharge Notice
 
Notice Delivered To: Patient
Relationship to Patient: Self
Representative Name: 
 
Delivery Method: HAND - Hand Delivered
Elba Days:
Prior Verbal Notification: 
 
Recipient Understood Notice: Yes
Recipient Signature: Yes
Med Rec Note Co-signed by Attending:
 
Coverage Notice Comment:  DISCHARGE IMM SERVED.
 
Last DP export: 19   2:57 p
Patient Name: MIREYA BENOIT
 
Encounter #: [id number]
Page [phone]
 
 
 
 
 
Electronically Signed by TEO FIELDS on 19 at 1618
 
 
 
 
 
 
**All edits/amendments must be made on the electronic document**
 
DICTATION DATE: 19     : ROSALES  19     
RPT#: [id number]                                DC DATE:        
                                               STATUS: ADM IN  
[facility]
 [place] [address]
***END OF REPORT***

## 2019-04-13 NOTE — MORECARE
CASE MANAGEMENT DISCHARGE SUMMARY
 
 
PATIENT: MIREYA BENOIT                  UNIT: [id number]
ACCOUNT#: [id number]                       ADM DATE: 19
AGE: 58     : 64  SEX: F            ROOM/BED: D.1202    
AUTHOR: DIAMOND,DOC                             PHYSICIAN:                               
 
REFERRING PHYSICIAN: JOAQUINA BARFIELD MD    
DATE OF SERVICE: 19
Discharge Plan
 
 
Patient Name: MIREYA BENOIT
Facility: [facility]:[place]
Encounter #: [id number]
Medical Record #: [id number]
: 1960
Planned Disposition: Home
Anticipated Discharge Date: 19
 
Discharge Date: 
Expected LOS: 5
Initial Reviewer: [id number]
Initial Review Date: 2019
Generated: 19   4:57 pm 
Comments
 
DCP- Discharge Planning
 
Updated by [id number]: Emili Louise on 4/10/19  12:23 pm CT
LATE ENTRY  19 @ 1230  
  
Patient Name: MIREYA BENOIT                                     
Admission Status: ER   
Accout number: [id number]                              
Admission Date: 2019   
: 1960                                                        
Admission Diagnosis:   
Attending: JOAQUINA BARFIELD                                                
Current LOS:  2   
  
Anticipated DC Date:    
Planned Disposition: Home   
Primary Insurance: [employer]   
  
  
Discharge Planning Comments: CM met with patient at bedside after explaining 
CM role and obtaining verbal consent. Patient lives at home with her 19 yr 
old daughter and plans to return there upon discharge.  Patient feels this 
 
would be a safe discharge.  CM discussed availability / needs of home health 
and medical equipment. Patient states she has a CPAP, walker and a cane. 
Patient also states that she has 10 steps going into her home.  Patient 
denies any discharge needs at this time. Patient states he will have her 
family drive her home upon discharge.  CM will continue to follow and assist 
as needed with discharge planning / needs.  
  
  
  
  
  
  
: Emili Louise
 DCPIA - Discharge Planning Initial Assessment
 
Updated by [email or web address]: Emili Louise on 4/10/19   1:18 pm
*  Is the patient Alert and Oriented?
Yes
*  How many steps to enter\exit or inside your home? 10 *  [facility] *  Pharmacy
[facility]
*  Preadmission Environment
Home with Family
*  ADLs
Independent
*  Other Equipment
CPAP, WALKER, CANE
*  List name and contact numbers for known caregivers / representatives who 
currently or will assist patient after discharge:
NERY PATEL [phone], [phone] SON AND DAUGHTER-N-LAW
*  Verbal permission to speak to the caregivers and representatives has been 
obtained from the patient.
Yes
*  Community resources currently utilized
None
*  Additional services required to return to the preadmission environment?
No
*  Can the patient safely return to the preadmission environment?
Yes
*  Has this patient been hospitalized within the prior 30 days at any 
hospital?
No
 
 
 
 
 
 
 
Last DP export: 4/10/19  12:26 p
Patient Name: MIREYA BENOIT
 
Encounter #: [id number]
Page [id number]
 
 
 
 
 
Electronically Signed by TEO FIELDS on 19 at 1558
 
 
 
 
 
 
**All edits/amendments must be made on the electronic document**
 
DICTATION DATE: 19     : ROSALES  19     
RPT#: [id number]                                DC DATE:        
                                               STATUS: ADM IN  
[facility]
 [place] [address]
***END OF REPORT***

## 2019-04-15 NOTE — MORECARE
CASE MANAGEMENT DISCHARGE SUMMARY
 
 
PATIENT: MIREYA BENOIT                  UNIT: [id number]
ACCOUNT#: [id number]                       ADM DATE: 19
AGE: 58     : 64  SEX: F            ROOM/BED: D.1202    
AUTHOR: DIAMOND,DOC                             PHYSICIAN:                               
 
REFERRING PHYSICIAN: JOAQUINA BARFIELD MD    
DATE OF SERVICE: 04/15/19
Discharge Plan
 
 
Patient Name: MIREYA BENOIT
Facility: [facility]:[place]
Encounter #: [id number]
Medical Record #: [id number]
: 1960
Planned Disposition: Home
Anticipated Discharge Date: 19
 
Discharge Date: 2019
Expected LOS: 5
Initial Reviewer: [id number]
Initial Review Date: 2019
Generated: 4/15/19  10:25 am 
Comments
 
DCP- Discharge Planning
 
Updated by [id number]: Arianna Kearns on 19   3:12 pm CT
CM RECEIVED A TELEPHONE CALL FROM CARLY , THAT PATIENT WAS 
BEING DISCHARGED TO HOME W/ HOME HEALTH. MD STATED SHE HAD HOME HEALTH 
HOWEVER PATIENT STATED THEY NEVER CONTACTED HER.  
TC TO [facility]- SPOKE W/ ON CALL. RAUL - NO REFERRAL  
DIXIE HAD SEEN THE PATIENT PREVIOUSLY AND SHE WAS DISCHARGED FROM SERVICE IN 
JANUARY.  
TC TO [facility] - SPOKE W/ ON CALL, ANAIS - NO REFERRAL.  
CM VISITED W/ THE PATIENT. DISCUSSED HOME HEALTH. PROVIDED THE HOME HEALTH 
PROVIDERS LIST. [facility]CE SHE [facility] THEM PREVIOUSLY.  
[facility] THE VISIT WILL BE MONDAY.  
PATIENT CHOICE FORM SIGNED. ORIGINAL COPY TO THE PATIENT. ORIGINAL COPY TO 
HARD COVER CHART.  
DISCHARGE IMM EXPLAINED. SIGNATURE OBTAINED. COPY TO THE PATIENT. SIGNED COPY 
TO  
THE HARD COVER CHART.  
ADVISED  OF PATIENT CHOICE FOR H/H.  
ADVISED PATIENT THE VISIT WOULD BE ON MONDAY.  
SPOKE WITH PRIMARY NURSE, BRIAN. REQUESTED SHE SEND DRESSINGS FOR 4 DAYS OF 
CHANGES.
 
DCP- Discharge Planning
 
Updated by [id number]: Emili Louise on 4/10/19  12:23 pm CT
LATE ENTRY  19 @ 1230  
  
Patient Name: MIREYA BENOIT                                     
Admission Status: ER   
Accout number: [id number]                              
Admission Date: 2019   
: 1960                                                        
Admission Diagnosis:   
Attending: JOAQUINA BARFIELD                                                
Current LOS:  2   
  
Anticipated DC Date:    
Planned Disposition: Home   
Primary Insurance: [employer]   
  
  
Discharge Planning Comments: CM met with patient at bedside after explaining 
CM role and obtaining verbal consent. Patient lives at home with her 19 yr 
old daughter and plans to return there upon discharge.  Patient feels this 
would be a safe discharge.  CM discussed availability / needs of home health 
and medical equipment. Patient states she has a CPAP, walker and a cane. 
Patient also states that she has 10 steps going into her home.  Patient 
denies any discharge needs at this time. Patient states he will have her 
family drive her home upon discharge.  CM will continue to follow and assist 
as needed with discharge planning / needs.  
  
  
  
  
  
  
: Emili Louise
 DCPIA - Discharge Planning Initial Assessment
 
Updated by [email or web address]: Emili Louise on 4/10/19   1:18 pm
*  Is the patient Alert and Oriented?
Yes
*  How many steps to enter\exit or inside your home? 10 *  [facility] *  Pharmacy
[facility]
*  Preadmission Environment
Home with Family
*  ADLs
Independent
*  Other Equipment
CPAP, WALKER, CANE
*  List name and contact numbers for known caregivers / representatives who 
currently or will assist patient after discharge:
ALEXANDR Porter ANDRESSAAUDELIA PATEL [phone], [phone] SON AND DAUGHTER-N-LAW
*  Verbal permission to speak to the caregivers and representatives has been 
 
obtained from the patient.
Yes
*  Community resources currently utilized
None
*  Additional services required to return to the preadmission environment?
No
*  Can the patient safely return to the preadmission environment?
Yes
*  Has this patient been hospitalized within the prior 30 days at any 
hospital?
No
 
 
 
 
 
Coverage Notice
 
Reviewer: [email or web address] Obdulio Kearns
 
Notice Issued Date-Time: 2019  15:55
Notice Type: IM Discharge Notice
 
Notice Delivered To: Patient
Relationship to Patient: Self
Representative Name: 
 
Delivery Method: HAND - Hand Delivered
Elba Days:
Prior Verbal Notification: 
 
Recipient Understood Notice: Yes
Recipient Signature: Yes
Med Rec Note Co-signed by Attending:
 
Coverage Notice Comment:  DISCHARGE IMM SERVED.
 
Last DP export: 19   3:18 p
Patient Name: MIREYA BENOIT
 
Encounter #: [id number]
Page [phone]
 
 
 
 
 
Electronically Signed by TEO FIELDS on 04/15/19 at 0926
 
 
 
 
 
 
**All edits/amendments must be made on the electronic document**
 
DICTATION DATE: 04/15/19 0925     : ROSALES  04/15/19 0925     
RPT#: [id number]                                DC DATE:19
                                               STATUS: DIS IN  
[facility]
 [place] [address]
***END OF REPORT***

## 2019-06-18 ENCOUNTER — HOSPITAL ENCOUNTER (OUTPATIENT)
Dept: HOSPITAL 84 - D.LAB | Age: 59
Discharge: HOME | End: 2019-06-18
Attending: SURGERY
Payer: COMMERCIAL

## 2019-06-18 VITALS — BODY MASS INDEX: 45.6 KG/M2

## 2019-06-18 DIAGNOSIS — Z01.812: ICD-10-CM

## 2019-06-18 DIAGNOSIS — E66.01: ICD-10-CM

## 2019-06-18 DIAGNOSIS — M10.9: ICD-10-CM

## 2019-06-18 DIAGNOSIS — E11.9: Primary | ICD-10-CM

## 2019-06-18 LAB
ALBUMIN SERPL-MCNC: 3.3 G/DL (ref 3.4–5)
ALP SERPL-CCNC: 135 U/L (ref 46–116)
ALT SERPL-CCNC: 30 U/L (ref 10–68)
ANION GAP SERPL CALC-SCNC: 16.2 MMOL/L (ref 8–16)
APTT BLD: 25.4 SECONDS (ref 22.8–39.4)
BASOPHILS NFR BLD AUTO: 0.2 % (ref 0–2)
BILIRUB DIRECT SERPL-MCNC: 0.12 MG/DL (ref 0–0.3)
BILIRUB INDIRECT SERPL-MCNC: 0.35 MG/DL (ref 0–1)
BILIRUB SERPL-MCNC: 0.47 MG/DL (ref 0.2–1.3)
BUN SERPL-MCNC: 42 MG/DL (ref 7–18)
CALCIUM SERPL-MCNC: 8.5 MG/DL (ref 8.5–10.1)
CHLORIDE SERPL-SCNC: 91 MMOL/L (ref 98–107)
CHOLEST/HDLC SERPL: 6.3 RATIO (ref 2.3–4.1)
CO2 SERPL-SCNC: 26.1 MMOL/L (ref 21–32)
CREAT SERPL-MCNC: 1.7 MG/DL (ref 0.6–1.3)
EOSINOPHIL NFR BLD: 0.5 % (ref 0–7)
ERYTHROCYTE [DISTWIDTH] IN BLOOD BY AUTOMATED COUNT: 14.3 % (ref 11.5–14.5)
GLOBULIN SER-MCNC: 3.7 G/L
GLUCOSE SERPL-MCNC: 696 MG/DL (ref 74–106)
H PYLORI IGG SER IA-ACNC: NEGATIVE
HCT VFR BLD CALC: 33.9 % (ref 36–48)
HDLC SERPL-MCNC: 30 MG/DL (ref 32–96)
HGB BLD-MCNC: 11.5 G/DL (ref 12–16)
IMM GRANULOCYTES NFR BLD: 0.2 % (ref 0–5)
INR PPP: 1.04 (ref 0.85–1.17)
LDLC SERPL-MCNC: (no result) MG/DL (ref 0–100)
LYMPHOCYTES NFR BLD AUTO: 23.4 % (ref 15–50)
MCH RBC QN AUTO: 31.1 PG (ref 26–34)
MCHC RBC AUTO-ENTMCNC: 33.9 G/DL (ref 31–37)
MCV RBC: 91.6 FL (ref 80–100)
MONOCYTES NFR BLD: 5.8 % (ref 2–11)
NEUTROPHILS NFR BLD AUTO: 69.9 % (ref 40–80)
OSMOLALITY SERPL CALC.SUM OF ELEC: 302 MOSM/KG (ref 275–300)
PLATELET # BLD: 215 10X3/UL (ref 130–400)
PMV BLD AUTO: 9.8 FL (ref 7.4–10.4)
POTASSIUM SERPL-SCNC: 4.3 MMOL/L (ref 3.5–5.1)
PROT SERPL-MCNC: 7 G/DL (ref 6.4–8.2)
PROTHROMBIN TIME: 13.1 SECONDS (ref 11.6–15)
RBC # BLD AUTO: 3.7 10X6/UL (ref 4–5.4)
SODIUM SERPL-SCNC: 129 MMOL/L (ref 136–145)
T4 FREE SERPL-MCNC: 1.06 NG/DL (ref 0.76–1.46)
TRIGL SERPL-MCNC: 594 MG/DL (ref 30–200)
TSH SERPL-ACNC: 3.62 UIU/ML (ref 0.36–3.74)
WBC # BLD AUTO: 8.1 10X3/UL (ref 4.8–10.8)

## 2019-06-19 ENCOUNTER — HOSPITAL ENCOUNTER (EMERGENCY)
Dept: HOSPITAL 84 - D.ER | Age: 59
Discharge: HOME | End: 2019-06-19
Payer: COMMERCIAL

## 2019-06-19 VITALS — DIASTOLIC BLOOD PRESSURE: 75 MMHG | SYSTOLIC BLOOD PRESSURE: 129 MMHG

## 2019-06-19 VITALS
WEIGHT: 288.6 LBS | BODY MASS INDEX: 43.74 KG/M2 | BODY MASS INDEX: 43.74 KG/M2 | WEIGHT: 288.6 LBS | HEIGHT: 68 IN | HEIGHT: 68 IN

## 2019-06-19 DIAGNOSIS — Z86.79: ICD-10-CM

## 2019-06-19 DIAGNOSIS — N28.9: ICD-10-CM

## 2019-06-19 DIAGNOSIS — E11.9: Primary | ICD-10-CM

## 2019-06-19 DIAGNOSIS — I10: ICD-10-CM

## 2019-06-19 LAB
ALBUMIN SERPL-MCNC: 3.3 G/DL (ref 3.4–5)
ALP SERPL-CCNC: 136 U/L (ref 46–116)
ALT SERPL-CCNC: 31 U/L (ref 10–68)
ANION GAP SERPL CALC-SCNC: 13.9 MMOL/L (ref 8–16)
APPEARANCE UR: CLEAR
BASOPHILS NFR BLD AUTO: 0.2 % (ref 0–2)
BILIRUB SERPL-MCNC: 0.36 MG/DL (ref 0.2–1.3)
BILIRUB SERPL-MCNC: NEGATIVE MG/DL
BUN SERPL-MCNC: 43 MG/DL (ref 7–18)
CALCIUM SERPL-MCNC: 8.6 MG/DL (ref 8.5–10.1)
CHLORIDE SERPL-SCNC: 95 MMOL/L (ref 98–107)
CO2 SERPL-SCNC: 26.9 MMOL/L (ref 21–32)
COLOR UR: YELLOW
CREAT SERPL-MCNC: 1.7 MG/DL (ref 0.6–1.3)
EOSINOPHIL NFR BLD: 0.7 % (ref 0–7)
ERYTHROCYTE [DISTWIDTH] IN BLOOD BY AUTOMATED COUNT: 14.3 % (ref 11.5–14.5)
GLOBULIN SER-MCNC: 3.2 G/L
GLUCOSE SERPL-MCNC: 1000 MG/DL
GLUCOSE SERPL-MCNC: 542 MG/DL (ref 74–106)
HCT VFR BLD CALC: 33.6 % (ref 36–48)
HGB BLD-MCNC: 11.4 G/DL (ref 12–16)
IMM GRANULOCYTES NFR BLD: 0.2 % (ref 0–5)
KETONES SERPL-MCNC: NEGATIVE MG/DL
KETONES UR STRIP-MCNC: NEGATIVE MG/DL
LYMPHOCYTES NFR BLD AUTO: 20.4 % (ref 15–50)
MAGNESIUM SERPL-MCNC: 1.8 MG/DL (ref 1.8–2.4)
MCH RBC QN AUTO: 30.8 PG (ref 26–34)
MCHC RBC AUTO-ENTMCNC: 33.9 G/DL (ref 31–37)
MCV RBC: 90.8 FL (ref 80–100)
MONOCYTES NFR BLD: 6.2 % (ref 2–11)
NEUTROPHILS NFR BLD AUTO: 72.3 % (ref 40–80)
NITRITE UR-MCNC: NEGATIVE MG/ML
OSMOLALITY SERPL CALC.SUM OF ELEC: 298 MOSM/KG (ref 275–300)
PH UR STRIP: 5 [PH] (ref 5–6)
PLATELET # BLD: 199 10X3/UL (ref 130–400)
PMV BLD AUTO: 9.4 FL (ref 7.4–10.4)
POTASSIUM SERPL-SCNC: 4.8 MMOL/L (ref 3.5–5.1)
PROT SERPL-MCNC: 6.5 G/DL (ref 6.4–8.2)
PROT UR-MCNC: (no result) MG/DL
RBC # BLD AUTO: 3.7 10X6/UL (ref 4–5.4)
SODIUM SERPL-SCNC: 131 MMOL/L (ref 136–145)
SP GR UR STRIP: 1.01 (ref 1–1.02)
UROBILINOGEN UR-MCNC: NORMAL MG/DL
WBC # BLD AUTO: 9.6 10X3/UL (ref 4.8–10.8)

## 2019-06-20 LAB
25(OH)D3 SERPL-MCNC: <4 NG/ML (ref 30–100)
T3FREE SERPL-MCNC: 2 PG/ML (ref 2–4.4)

## 2019-08-24 ENCOUNTER — HOSPITAL ENCOUNTER (INPATIENT)
Dept: HOSPITAL 84 - D.ER | Age: 59
LOS: 5 days | Discharge: HOME | DRG: 854 | End: 2019-08-29
Attending: FAMILY MEDICINE | Admitting: FAMILY MEDICINE
Payer: COMMERCIAL

## 2019-08-24 VITALS
HEIGHT: 68 IN | BODY MASS INDEX: 42.93 KG/M2 | WEIGHT: 283.3 LBS | BODY MASS INDEX: 42.93 KG/M2 | HEIGHT: 68 IN | BODY MASS INDEX: 42.93 KG/M2 | WEIGHT: 283.3 LBS

## 2019-08-24 DIAGNOSIS — I12.9: ICD-10-CM

## 2019-08-24 DIAGNOSIS — E66.01: ICD-10-CM

## 2019-08-24 DIAGNOSIS — N18.9: ICD-10-CM

## 2019-08-24 DIAGNOSIS — N76.4: ICD-10-CM

## 2019-08-24 DIAGNOSIS — E11.22: ICD-10-CM

## 2019-08-24 DIAGNOSIS — E11.65: ICD-10-CM

## 2019-08-24 DIAGNOSIS — N17.9: ICD-10-CM

## 2019-08-24 DIAGNOSIS — I25.10: ICD-10-CM

## 2019-08-24 DIAGNOSIS — A41.9: Primary | ICD-10-CM

## 2019-08-24 LAB
ALBUMIN SERPL-MCNC: 2.7 G/DL (ref 3.4–5)
ALP SERPL-CCNC: 147 U/L (ref 46–116)
ALT SERPL-CCNC: 17 U/L (ref 10–68)
ANION GAP SERPL CALC-SCNC: 16.8 MMOL/L (ref 8–16)
APPEARANCE UR: (no result)
APTT BLD: 28.3 SECONDS (ref 22.8–39.4)
BACTERIA #/AREA URNS HPF: (no result) /HPF
BASOPHILS NFR BLD AUTO: 0.3 % (ref 0–2)
BILIRUB SERPL-MCNC: 0.28 MG/DL (ref 0.2–1.3)
BILIRUB SERPL-MCNC: NEGATIVE MG/DL
BUN SERPL-MCNC: 58 MG/DL (ref 7–18)
CALCIUM SERPL-MCNC: 8.1 MG/DL (ref 8.5–10.1)
CHLORIDE SERPL-SCNC: 94 MMOL/L (ref 98–107)
CK SERPL-CCNC: 174 UL (ref 21–215)
CO2 SERPL-SCNC: 22.5 MMOL/L (ref 21–32)
COLOR UR: YELLOW
CREAT SERPL-MCNC: 4.9 MG/DL (ref 0.6–1.3)
CRP SERPL-MCNC: 20.4 MG/DL (ref 0–0.9)
EOSINOPHIL NFR BLD: 2 % (ref 0–7)
ERYTHROCYTE [DISTWIDTH] IN BLOOD BY AUTOMATED COUNT: 13 % (ref 11.5–14.5)
GLOBULIN SER-MCNC: 4.4 G/L
GLUCOSE SERPL-MCNC: 218 MG/DL (ref 74–106)
GLUCOSE SERPL-MCNC: NEGATIVE MG/DL
HCT VFR BLD CALC: 34.6 % (ref 36–48)
HGB BLD-MCNC: 11.5 G/DL (ref 12–16)
IMM GRANULOCYTES NFR BLD: 0.3 % (ref 0–5)
INR PPP: 1.12 (ref 0.85–1.17)
KETONES UR STRIP-MCNC: (no result) MG/DL
LYMPHOCYTES NFR BLD AUTO: 16.4 % (ref 15–50)
MCH RBC QN AUTO: 30.6 PG (ref 26–34)
MCHC RBC AUTO-ENTMCNC: 33.2 G/DL (ref 31–37)
MCV RBC: 92 FL (ref 80–100)
MONOCYTES NFR BLD: 8.8 % (ref 2–11)
NEUTROPHILS NFR BLD AUTO: 72.2 % (ref 40–80)
NITRITE UR-MCNC: POSITIVE MG/ML
OSMOLALITY SERPL CALC.SUM OF ELEC: 281 MOSM/KG (ref 275–300)
PH UR STRIP: 5 [PH] (ref 5–6)
PLATELET # BLD: 254 10X3/UL (ref 130–400)
PMV BLD AUTO: 9.7 FL (ref 7.4–10.4)
POTASSIUM SERPL-SCNC: 4.3 MMOL/L (ref 3.5–5.1)
PROT SERPL-MCNC: 7.1 G/DL (ref 6.4–8.2)
PROT UR-MCNC: (no result) MG/DL
PROTHROMBIN TIME: 13.9 SECONDS (ref 11.6–15)
RBC # BLD AUTO: 3.76 10X6/UL (ref 4–5.4)
RBC #/AREA URNS HPF: (no result) /HPF (ref 0–5)
SODIUM SERPL-SCNC: 129 MMOL/L (ref 136–145)
SP GR UR STRIP: 1.02 (ref 1–1.02)
SQUAMOUS #/AREA URNS HPF: (no result) /HPF (ref 0–5)
TROPONIN I SERPL-MCNC: < 0.017 NG/ML (ref 0–0.06)
UROBILINOGEN UR-MCNC: NORMAL MG/DL
WBC # BLD AUTO: 15.7 10X3/UL (ref 4.8–10.8)
WBC #/AREA URNS HPF: >50 /HPF (ref 0–5)

## 2019-08-25 VITALS — SYSTOLIC BLOOD PRESSURE: 96 MMHG | DIASTOLIC BLOOD PRESSURE: 41 MMHG

## 2019-08-25 VITALS — SYSTOLIC BLOOD PRESSURE: 116 MMHG | DIASTOLIC BLOOD PRESSURE: 67 MMHG

## 2019-08-25 VITALS — DIASTOLIC BLOOD PRESSURE: 55 MMHG | SYSTOLIC BLOOD PRESSURE: 99 MMHG

## 2019-08-25 VITALS — SYSTOLIC BLOOD PRESSURE: 127 MMHG | DIASTOLIC BLOOD PRESSURE: 62 MMHG

## 2019-08-25 VITALS — SYSTOLIC BLOOD PRESSURE: 90 MMHG | DIASTOLIC BLOOD PRESSURE: 54 MMHG

## 2019-08-25 VITALS — DIASTOLIC BLOOD PRESSURE: 53 MMHG | SYSTOLIC BLOOD PRESSURE: 108 MMHG

## 2019-08-25 VITALS — DIASTOLIC BLOOD PRESSURE: 66 MMHG | SYSTOLIC BLOOD PRESSURE: 110 MMHG

## 2019-08-25 VITALS — SYSTOLIC BLOOD PRESSURE: 107 MMHG | DIASTOLIC BLOOD PRESSURE: 50 MMHG

## 2019-08-25 VITALS — SYSTOLIC BLOOD PRESSURE: 86 MMHG | DIASTOLIC BLOOD PRESSURE: 64 MMHG

## 2019-08-25 VITALS — DIASTOLIC BLOOD PRESSURE: 96 MMHG | SYSTOLIC BLOOD PRESSURE: 120 MMHG

## 2019-08-25 VITALS — DIASTOLIC BLOOD PRESSURE: 57 MMHG | SYSTOLIC BLOOD PRESSURE: 88 MMHG

## 2019-08-25 VITALS — DIASTOLIC BLOOD PRESSURE: 50 MMHG | SYSTOLIC BLOOD PRESSURE: 94 MMHG

## 2019-08-25 VITALS — SYSTOLIC BLOOD PRESSURE: 99 MMHG | DIASTOLIC BLOOD PRESSURE: 66 MMHG

## 2019-08-25 VITALS — SYSTOLIC BLOOD PRESSURE: 98 MMHG | DIASTOLIC BLOOD PRESSURE: 61 MMHG

## 2019-08-25 VITALS — SYSTOLIC BLOOD PRESSURE: 92 MMHG | DIASTOLIC BLOOD PRESSURE: 66 MMHG

## 2019-08-25 VITALS — DIASTOLIC BLOOD PRESSURE: 65 MMHG | SYSTOLIC BLOOD PRESSURE: 119 MMHG

## 2019-08-25 VITALS — SYSTOLIC BLOOD PRESSURE: 133 MMHG | DIASTOLIC BLOOD PRESSURE: 100 MMHG

## 2019-08-25 VITALS — DIASTOLIC BLOOD PRESSURE: 53 MMHG | SYSTOLIC BLOOD PRESSURE: 91 MMHG

## 2019-08-25 VITALS — DIASTOLIC BLOOD PRESSURE: 88 MMHG | SYSTOLIC BLOOD PRESSURE: 111 MMHG

## 2019-08-25 VITALS — DIASTOLIC BLOOD PRESSURE: 72 MMHG | SYSTOLIC BLOOD PRESSURE: 114 MMHG

## 2019-08-25 VITALS — DIASTOLIC BLOOD PRESSURE: 49 MMHG | SYSTOLIC BLOOD PRESSURE: 114 MMHG

## 2019-08-25 VITALS — SYSTOLIC BLOOD PRESSURE: 94 MMHG | DIASTOLIC BLOOD PRESSURE: 43 MMHG

## 2019-08-25 VITALS — SYSTOLIC BLOOD PRESSURE: 120 MMHG | DIASTOLIC BLOOD PRESSURE: 61 MMHG

## 2019-08-25 NOTE — NUR
PT REPOSITIONED UP IN BED, C/O PAIN AT L LABIA SITE, DANIEL-CARE AND ABD PAD
PLACED.  ADMIN PRN NORCO - SEE EMAR.  PT DENIES OTHER NEEDS.

## 2019-08-25 NOTE — NUR
SPOKE TO DR. HSIEH CONCERNING PATIENT RATING PAIN 8/10, ADVISED TO RESTART
PATIENTS HOME MED HYDROCODONE 10/325 Q6H PRN. PATIENT DURING VOIDING C/O PAIN
TO ABCESS THAT IS ON LEFT EXTERNAL LABIA/GROIN. ADVISED TO INSERT DEVINE CATH.
ORDER PLACED.

## 2019-08-25 NOTE — NUR
CONSENT OBTAINED FROM PATIENT FOR EXCISIONAL DEBRIDEMENT OF LEFT LABIAL AND
PERINEAL ABSCESS WITH PACKING AND PLACED ON CHART.

## 2019-08-25 NOTE — NUR
REC'D TO CARE, RN ASSESSMENT PER FLOWSHEET.  PT RESTING QUIETLY, VSS. GROIN
AREA REDNESS/SORE AS NOTED.  PT VERBALIZES NPO AFTER MN FOR O.R. IN AM.
DENIES NEEDS.  C/L IN REACH.

## 2019-08-25 NOTE — NUR
RECEIVED PATIENT FROM ED VIA BED ACCOMPANIED BY ED RN. TRANSFERRED TO ICU
BED 2300. MONITORS CONNECTED TO PATIENT WITH ALARMS SET. VSS. ADMIT ASSESSMENT
COMPLETED AT THIS TIME PER FLOW SHEET. NO ACUTE DISTRESS OBSERVED AT THIS
TIME. PATIENT ORIENTED TO ICU ROOM/SCHEDULE/CALL LIGHT. CALL LIGHT WITHIN
REACH AND ABLE TO UTILIZE TO MAKE NEEDS KNOWN.

## 2019-08-25 NOTE — NUR
REASSESSMENT PER FLOWSHEET, NO ACUTE CHANGES.  PT REPORTS ADEQUATE PAIN
RELIEF.  SMALL AMT BROWN/PINK DRAINAGE ON ABD PAD NOTED.  C/L IN REACH.

## 2019-08-26 VITALS — SYSTOLIC BLOOD PRESSURE: 111 MMHG | DIASTOLIC BLOOD PRESSURE: 53 MMHG

## 2019-08-26 VITALS — DIASTOLIC BLOOD PRESSURE: 58 MMHG | SYSTOLIC BLOOD PRESSURE: 105 MMHG

## 2019-08-26 VITALS — SYSTOLIC BLOOD PRESSURE: 118 MMHG | DIASTOLIC BLOOD PRESSURE: 44 MMHG

## 2019-08-26 VITALS — SYSTOLIC BLOOD PRESSURE: 115 MMHG | DIASTOLIC BLOOD PRESSURE: 57 MMHG

## 2019-08-26 VITALS — DIASTOLIC BLOOD PRESSURE: 80 MMHG | SYSTOLIC BLOOD PRESSURE: 130 MMHG

## 2019-08-26 VITALS — SYSTOLIC BLOOD PRESSURE: 157 MMHG | DIASTOLIC BLOOD PRESSURE: 73 MMHG

## 2019-08-26 VITALS — DIASTOLIC BLOOD PRESSURE: 62 MMHG | SYSTOLIC BLOOD PRESSURE: 132 MMHG

## 2019-08-26 VITALS — DIASTOLIC BLOOD PRESSURE: 63 MMHG | SYSTOLIC BLOOD PRESSURE: 109 MMHG

## 2019-08-26 VITALS — DIASTOLIC BLOOD PRESSURE: 43 MMHG | SYSTOLIC BLOOD PRESSURE: 105 MMHG

## 2019-08-26 VITALS — SYSTOLIC BLOOD PRESSURE: 132 MMHG | DIASTOLIC BLOOD PRESSURE: 66 MMHG

## 2019-08-26 VITALS — SYSTOLIC BLOOD PRESSURE: 108 MMHG | DIASTOLIC BLOOD PRESSURE: 55 MMHG

## 2019-08-26 VITALS — SYSTOLIC BLOOD PRESSURE: 127 MMHG | DIASTOLIC BLOOD PRESSURE: 54 MMHG

## 2019-08-26 VITALS — DIASTOLIC BLOOD PRESSURE: 56 MMHG | SYSTOLIC BLOOD PRESSURE: 123 MMHG

## 2019-08-26 LAB
ALBUMIN SERPL-MCNC: 2 G/DL (ref 3.4–5)
ALP SERPL-CCNC: 107 U/L (ref 46–116)
ALT SERPL-CCNC: 10 U/L (ref 10–68)
ANION GAP SERPL CALC-SCNC: 10.6 MMOL/L (ref 8–16)
BASOPHILS NFR BLD AUTO: 0.1 % (ref 0–2)
BILIRUB SERPL-MCNC: 0.26 MG/DL (ref 0.2–1.3)
BUN SERPL-MCNC: 48 MG/DL (ref 7–18)
CALCIUM SERPL-MCNC: 7.5 MG/DL (ref 8.5–10.1)
CHLORIDE SERPL-SCNC: 106 MMOL/L (ref 98–107)
CO2 SERPL-SCNC: 25.8 MMOL/L (ref 21–32)
CREAT SERPL-MCNC: 2.1 MG/DL (ref 0.6–1.3)
EOSINOPHIL NFR BLD: 6.5 % (ref 0–7)
ERYTHROCYTE [DISTWIDTH] IN BLOOD BY AUTOMATED COUNT: 12.9 % (ref 11.5–14.5)
GLOBULIN SER-MCNC: 3.5 G/L
GLUCOSE SERPL-MCNC: 148 MG/DL (ref 74–106)
HCT VFR BLD CALC: 29.4 % (ref 36–48)
HGB BLD-MCNC: 9.7 G/DL (ref 12–16)
IMM GRANULOCYTES NFR BLD: 0.4 % (ref 0–5)
LYMPHOCYTES NFR BLD AUTO: 24.1 % (ref 15–50)
MCH RBC QN AUTO: 30 PG (ref 26–34)
MCHC RBC AUTO-ENTMCNC: 33 G/DL (ref 31–37)
MCV RBC: 91 FL (ref 80–100)
MONOCYTES NFR BLD: 9.4 % (ref 2–11)
NEUTROPHILS NFR BLD AUTO: 59.5 % (ref 40–80)
OSMOLALITY SERPL CALC.SUM OF ELEC: 291 MOSM/KG (ref 275–300)
PLATELET # BLD: 200 10X3/UL (ref 130–400)
PMV BLD AUTO: 9.1 FL (ref 7.4–10.4)
POTASSIUM SERPL-SCNC: 4.4 MMOL/L (ref 3.5–5.1)
PROT SERPL-MCNC: 5.5 G/DL (ref 6.4–8.2)
RBC # BLD AUTO: 3.23 10X6/UL (ref 4–5.4)
SODIUM SERPL-SCNC: 138 MMOL/L (ref 136–145)
TROPONIN I SERPL-MCNC: < 0.017 NG/ML (ref 0–0.06)
VANCOMYCIN SERPL-MCNC: 5.8 UG/ML (ref 10–20)
WBC # BLD AUTO: 8.5 10X3/UL (ref 4.8–10.8)

## 2019-08-26 PROCEDURE — 0UBM0ZZ EXCISION OF VULVA, OPEN APPROACH: ICD-10-PCS | Performed by: SURGERY

## 2019-08-26 NOTE — NUR
PATIENT RESTING. STATES RELIEF OF PAIN WITH NORCO GIVEN. NO OTHER COMPLAINS AT
THIS TIME. NO DISTRESS NOTED. NO CHANGES. WILL CONTINUE TO MONITOR.

## 2019-08-26 NOTE — NUR
A&O X 4. FAMILY AT BEDSIDE. ORIENTED X 4, PT IS VERY LETHARGIC. PACKING
TO LEFT GROIN C/D/I. DENIES
NEEDS
AT THIS TIME. WILL CONTINUE TO MONITOR.

## 2019-08-26 NOTE — NUR
PATIENT IS ALERT AND ORIENTED. NO PAIN AT THIS TIME. DENIES NEEDS AT THIS
TIME. PATIENT NPO. CALL LIGHT WITHIN REACH. BED LOW AND LOCKED. AWAITING PREOP
ORDRES. WILL CONITINUE TO MONITOR. SHIFT ASSESSMENT COMPLETED.

## 2019-08-26 NOTE — NUR
RECEIVED PT FROM ICU. ALERT AND ORIENTED. NO C/O PAIN. NO S/S OF ACUTE
DISTRESS NOTED. BROUGHT BY BED. IV IN LEFT HAND OUT. IV IN RIGHT FOREARM
INFILTRATED. DISCONTINUED BOTH IV'S, CATHETER TIP INTACT. PT DENIES ANY NEEDS.

## 2019-08-26 NOTE — NUR
PATIENT SLEEPING. EASILY AROUSED. VSS. NO COMPLAINTS AT THIS TIME. NO DISTRESS
NOTED. NO CHANGES. REASSESSMENT COMPLTED.

## 2019-08-26 NOTE — NUR
PATIENT SLEEPING. NPO. CLEAR LUNGS BILAT. PALP PULSES. NO DISTRESS NOTED. NO
CHANGES FROM SHIFT ASSESSMENT. DRAINAGE ON ABD PAD ON PERINEAL AREA. CHANGED
ABD PAD. VSS. WILL CONTINUE TO MONITOR

## 2019-08-27 VITALS — SYSTOLIC BLOOD PRESSURE: 159 MMHG | DIASTOLIC BLOOD PRESSURE: 58 MMHG

## 2019-08-27 VITALS — SYSTOLIC BLOOD PRESSURE: 143 MMHG | DIASTOLIC BLOOD PRESSURE: 69 MMHG

## 2019-08-27 VITALS — SYSTOLIC BLOOD PRESSURE: 121 MMHG | DIASTOLIC BLOOD PRESSURE: 49 MMHG

## 2019-08-27 VITALS — DIASTOLIC BLOOD PRESSURE: 48 MMHG | SYSTOLIC BLOOD PRESSURE: 133 MMHG

## 2019-08-27 VITALS — SYSTOLIC BLOOD PRESSURE: 136 MMHG | DIASTOLIC BLOOD PRESSURE: 47 MMHG

## 2019-08-27 VITALS — DIASTOLIC BLOOD PRESSURE: 51 MMHG | SYSTOLIC BLOOD PRESSURE: 152 MMHG

## 2019-08-27 NOTE — NUR
PT RESTING IN BED WITH EYES OPEN, ALERT AND ORIENTED. IV LOCATED TO LEFT
FOREARM RUNNING NS @ 125ML. DENIES ANY NEEDS AT THIS TIME, WILL CONTINUE TO
MONITOR. BED LOW, CALL LIGHT IN REACH, RAILS UP X 2.

## 2019-08-27 NOTE — NUR
RESTING IN BED EYES CLOSED, RESP UNLABORED, AROUSES TO VOICE AND TOUCH, BUT
APPEARS VERY DROWSY WILL MONITOR, SEE SHIFT ASSESSMENT, CALL LIGHT IN REACH

## 2019-08-28 VITALS — SYSTOLIC BLOOD PRESSURE: 180 MMHG | DIASTOLIC BLOOD PRESSURE: 64 MMHG

## 2019-08-28 VITALS — SYSTOLIC BLOOD PRESSURE: 152 MMHG | DIASTOLIC BLOOD PRESSURE: 45 MMHG

## 2019-08-28 VITALS — SYSTOLIC BLOOD PRESSURE: 130 MMHG | DIASTOLIC BLOOD PRESSURE: 44 MMHG

## 2019-08-28 VITALS — SYSTOLIC BLOOD PRESSURE: 155 MMHG | DIASTOLIC BLOOD PRESSURE: 60 MMHG

## 2019-08-28 VITALS — DIASTOLIC BLOOD PRESSURE: 69 MMHG | SYSTOLIC BLOOD PRESSURE: 150 MMHG

## 2019-08-28 NOTE — NUR
Nutrition follow-up:
Diet: ADA consistent CHO
PO poor to fair; pt has been NPO for surgery 8/26 and has had periods of
lethagy per nursing.
labs reviewed
Wt: 283#
NS @ 125 ml/hr
Pt would benefit from ProcalAmine PPN @ 125 ml/hr due to poor po intake after
surgery.
RDN following.

## 2019-08-28 NOTE — OP
PATIENT NAME:  MIREYA BENOIT                        MEDICAL RECORD: [id number]
:[birth year]                                             LOCATION:[id number]
                                                         ADMISSION DATE:19
SURGEON:  EDU HORAN MD            
 
 
DATE OF OPERATION:  2019
 
PREOPERATIVE DIAGNOSIS:  Left labial abscess.
 
POSTOPERATIVE DIAGNOSIS:  Left labial abscess.
 
PROCEDURES:  Excisional debridement with marsupialization and packing of left
labial abscess.  Dimensions of debridement, including margins, measured 3.2 x
3.4 cm included skin and subcutaneous tissue as well as abscess cavity.
 
SURGEON:  Edu Horan MD
 
ASSISTANT:  None.
 
BLOOD LOSS:  Minimal.
 
ANESTHESIA:  General.
 
COMPLICATIONS:  None.
 
OPERATIVE COURSE:  The patient was conveyed to the operating room electively on
2019.  General anesthesia was induced by the anesthesia staff.  The
patient was positioned in the lithotomy position.  The perineum was sterilely
prepped and draped.
 
Utilizing a scalpel, I excised a roughly circular defect overlying the labia
majora on the left.  I dissected down to an abscess cavity.  Cultures were
obtained.  The excised dimensions are listed above.  I curetted out the abscess
cavity.  I then irrigated with hydrogen peroxide.  I marsupialized the wound
with a running locking 3-0 Vicryl Rapide suture.  I then packed the wound with
Kerlix that was soaked in quarter-strength Dakin's.  A sterile dressing was
applied.
 
The patient was then extubated and conveyed to post-anesthesia care unit where
she was in stable condition.
 
TRANSINT:[id number] Voice Confirmation ID: [id number] DOCUMENT ID: [id number]
                                           
                                           EDU HORAN MD            
 
 
 
Electronically Signed by EDU HORAN on 19 at 1034
 
CC: JOAQUINA BARFIELD MD                                  [phone]
DICTATION DATE: 19 1627     :     19 2243      ADM IN  
                                                                              
[place] [address]

## 2019-08-28 NOTE — NUR
LYING IN BED WITH TV ON, ISOLATION PRECAUTIONS IN PLACE. REQUESTS PHONE TO BE
PLUGGED UP, IT WAS, REQUESTS LARGE CUP OF ICE WATER, GRANTED. WILL NOTE ANY
FURTHER CHANGE.

## 2019-08-28 NOTE — MORECARE
CASE MANAGEMENT DISCHARGE SUMMARY
 
 
PATIENT: MIREYA BENOIT                  UNIT: [id number]
ACCOUNT#: [id number]                       ADM DATE: 19
AGE: 58     : [birth year]  SEX: F            ROOM/BED: D.2239    
AUTHOR: DIAMOND,DOC                             PHYSICIAN:                               
 
REFERRING PHYSICIAN: JOAQUINA BARFIELD MD    
DATE OF SERVICE: 19
Discharge Plan
 
 
Patient Name: MIREYA BENOIT
Facility: [facility]:[place]
Encounter #: [id number]
Medical Record #: [id number]
: 1960
Planned Disposition: Home with Home Health
Anticipated Discharge Date: 19
 
Discharge Date: 
Expected LOS: 5
Initial Reviewer: [email or web address]
Initial Review Date: 2019
Generated: 19   5:38 pm 
Comments
 
DCP- Discharge Planning
 
Updated by [id number]: Emily Whiting on 19   3:35 pm CT
Patient Name: MIREYA BENOIT                                     
Admission Status: ER   
Accout number: [id number]                              
Admission Date: 2019   
: 1960                                                        
Admission Diagnosis:   
Attending: JOAQUINA BARFIELD                                                
Current LOS:  3   
  
Anticipated DC Date: 2019   
Planned Disposition: Home with Home Health   
Primary Insurance: [employer]   
  
  
Discharge Planning Comments: CM met with patient to discuss discharge 
planning/needs. She states her daughter (Franci) is living with her at this 
time and would be a teachable care giver for home health to teach dressing 
changes. She states she uses her cane for ambulation, otherwise is 
independent with all ADL's and AIDL's. I discussed availability of rehab, SNF,
 
 home health and DME. She states she would like to use [facility] for dressing 
changes. She denies other needs at this time. CM will continue to follow and 
assist with discharge planning/needs.  
  
  
  
  
  
  
: Emily Whiting
DCP- Discharge Planning
 
Updated by [id number]: Emili Louise on 19   4:23 pm CT
CM attempted to see patient for discharge planning. Patient is currently in 
surgery CM will come back at a later time for d/c planning eval. CM will 
continue to follow and assist as needed with discharge planning / needs.
 DCPIA - Discharge Planning Initial Assessment
 
Updated by [email or web address]: Emily Whiting on 19   4:32 pm
*  Is the patient Alert and Oriented?
Yes
*  How many steps to enter\exit or inside your home? 10/1 *  PCP Dr. Barfield * 
Pharmacy
[facility]
*  Preadmission Environment
Home with Family
*  ADLs
Partial Dependent
*  Partial ADLs (Assistance needed)
Ambulation
*  Equipment
Cane
CPAP
Walker
*  List name and contact numbers for known caregivers / representatives who 
currently or will assist patient after discharge:
Frank Angel - [phone] or [phone]  Franci - DTR - no phone
 
*  Verbal permission to speak to the caregivers and representatives has been 
obtained from the patient.
Yes
*  Community resources currently utilized
None
*  Additional services required to return to the preadmission environment?
Yes
*  Can the patient safely return to the preadmission environment?
Yes
*  Has this patient been hospitalized within the prior 30 days at any 
hospital?
No
 
External Providers
 
External Provider: [facility]
 
Next Contact Date: 
Service Request Date: 
Service Type: 
Resolution: 
 
Reviewer: 
Comments: 
 
 
 
 
Coverage Notice
 
Reviewer: [email or web address] Whiting
 
Notice Issued Date-Time: 2019  16:28
Notice Type: Patient Choice Letter
 
Notice Delivered To: Patient
Relationship to Patient: Self
Representative Name: 
 
Delivery Method: HAND - Hand Delivered
Elba Days:
Prior Verbal Notification: 
 
Recipient Understood Notice: Yes
Recipient Signature: Yes
Med Rec Note Co-signed by Attending:
 
Coverage Notice Comment:  [facility] for [facility]
Reviewer: [email or web address] Whiting
 
Notice Issued Date-Time: 2019  16:28
Notice Type: IM Discharge Notice
 
Notice Delivered To: Patient
Relationship to Patient: Self
Representative Name: 
 
Delivery Method: HAND - Hand Delivered
Elba Days:
Prior Verbal Notification: 
 
Recipient Understood Notice: Yes
Recipient Signature: Yes
Med Rec Note Co-signed by Attending:
 
Coverage Notice Comment:  IMM explained, signed, given, copy placed in Mr
Patient Name: MIREYA BENOIT
 
Encounter #: [id number]
Page [id number]
 
 
 
 
 
Electronically Signed by TEO FIELDS on 19 at 1638
 
 
 
 
 
 
**All edits/amendments must be made on the electronic document**
 
DICTATION DATE: 19     : ROSALES  198     
RPT#: [id number]                                DC DATE:        
                                               STATUS: ADM IN  
[facility]
 [facility] [place] [address]
***END OF REPORT***

## 2019-08-29 VITALS — SYSTOLIC BLOOD PRESSURE: 148 MMHG | DIASTOLIC BLOOD PRESSURE: 53 MMHG

## 2019-08-29 VITALS — DIASTOLIC BLOOD PRESSURE: 61 MMHG | SYSTOLIC BLOOD PRESSURE: 155 MMHG

## 2019-08-29 VITALS — SYSTOLIC BLOOD PRESSURE: 131 MMHG | DIASTOLIC BLOOD PRESSURE: 59 MMHG

## 2019-08-29 LAB
ANION GAP SERPL CALC-SCNC: 11.1 MMOL/L (ref 8–16)
BASOPHILS NFR BLD AUTO: 0.3 % (ref 0–2)
BUN SERPL-MCNC: 12 MG/DL (ref 7–18)
CALCIUM SERPL-MCNC: 7.5 MG/DL (ref 8.5–10.1)
CHLORIDE SERPL-SCNC: 110 MMOL/L (ref 98–107)
CO2 SERPL-SCNC: 28.5 MMOL/L (ref 21–32)
CREAT SERPL-MCNC: 0.9 MG/DL (ref 0.6–1.3)
EOSINOPHIL NFR BLD: 4.7 % (ref 0–7)
ERYTHROCYTE [DISTWIDTH] IN BLOOD BY AUTOMATED COUNT: 13.1 % (ref 11.5–14.5)
GLUCOSE SERPL-MCNC: 101 MG/DL (ref 74–106)
HCT VFR BLD CALC: 28.5 % (ref 36–48)
HGB BLD-MCNC: 9.2 G/DL (ref 12–16)
IMM GRANULOCYTES NFR BLD: 0.4 % (ref 0–5)
LYMPHOCYTES NFR BLD AUTO: 33.1 % (ref 15–50)
MCH RBC QN AUTO: 30.1 PG (ref 26–34)
MCHC RBC AUTO-ENTMCNC: 32.3 G/DL (ref 31–37)
MCV RBC: 93.1 FL (ref 80–100)
MONOCYTES NFR BLD: 9.2 % (ref 2–11)
NEUTROPHILS NFR BLD AUTO: 52.3 % (ref 40–80)
OSMOLALITY SERPL CALC.SUM OF ELEC: 288 MOSM/KG (ref 275–300)
PLATELET # BLD: 182 10X3/UL (ref 130–400)
PMV BLD AUTO: 8.9 FL (ref 7.4–10.4)
POTASSIUM SERPL-SCNC: 4.6 MMOL/L (ref 3.5–5.1)
RBC # BLD AUTO: 3.06 10X6/UL (ref 4–5.4)
SODIUM SERPL-SCNC: 145 MMOL/L (ref 136–145)
WBC # BLD AUTO: 7.4 10X3/UL (ref 4.8–10.8)

## 2019-08-29 NOTE — NUR
CALL WAS PLACED TO ON CALL ABOUT PT BEING DC HOME AND SPOKE WITH DR. KILPATRICK
AND REC'D VERBAL ORDERS FOR PT TO BE DC HOME AND THAT FOR HER TO CALL DR. GUILLEN ON TOMORROW TO ASK IF HE WANTS HER ON ANY ABTX THERAPY.

## 2019-08-29 NOTE — NUR
PT DISCHARGED VIA FAMILY MEMBER, ALL INSTRUCTIONS REGARDING DISCHARGE WERE
VERBALLY UNDERSTOOD, ALL BELONGINGS TOOK WITH.

## 2019-08-29 NOTE — NUR
UPON REASSESSMENT, PT IS RESTING COMFORTABLY IN BED WITH NO S/S OF ANY ACUTE
DISTRESS OR DISCOMFORT. WILL NOTE ANY CHANGE.

## 2019-08-29 NOTE — NUR
C/O PERINEAL PAIN RATING  6/10 ON PAIN SCALE. MEDICATED WITH NORCO PER ORDERS.
C/L IN REACH AT BEDSIDE

## 2019-08-29 NOTE — MORECARE
CASE MANAGEMENT DISCHARGE SUMMARY
 
 
PATIENT: MIREYA BENOIT                  UNIT: [id number]
ACCOUNT#: [id number]                       ADM DATE: 19
AGE: 58     : [birth year]  SEX: F            ROOM/BED: D.2239    
AUTHOR: DIAMOND,DOC                             PHYSICIAN:                               
 
REFERRING PHYSICIAN: JOAQUINA BARFIELD MD    
DATE OF SERVICE: 19
Discharge Plan
 
 
Patient Name: MIREYA BENOIT
Facility: [facility]:[place]
Encounter #: [id number]
Medical Record #: [id number]
: 1960
Planned Disposition: Home with Home Health
Anticipated Discharge Date: 19
 
Discharge Date: 
Expected LOS: 5
Initial Reviewer: [email or web address]
Initial Review Date: 2019
Generated: 19   4:57 pm 
Comments
 
DCP- Discharge Planning
 
Updated by [id number]: Emily Vasquezuriah on 19   2:52 pm CT
Received discharge orders, she is no longer needing packing. She states she 
no longer needs home health. I called Juliana with "[employer] [facility] and informed her 
that she no longer wants home health.
DCP- Discharge Planning
 
Updated by [id number]: Emily Whiting on 19   3:35 pm CT
Patient Name: MIREYA BENOIT                                     
Admission Status: ER   
Accout number: [id number]                              
Admission Date: 2019   
: 1960                                                        
Admission Diagnosis:   
Attending: JOAQUINA BARFIELD                                                
Current LOS:  3   
  
Anticipated DC Date: 2019   
Planned Disposition: Home with Home Health   
Primary Insurance: [facility]   
  
 
  
Discharge Planning Comments: CM met with patient to discuss discharge 
planning/needs. She states her daughter (Franci) is living with her at this 
time and would be a teachable care giver for home health to teach dressing 
changes. She states she uses her cane for ambulation, otherwise is 
independent with all ADL's and AIDL's. I discussed availability of rehab, SNF,
 home health and DME. She states she would like to use [facility] for dressing 
changes. She denies other needs at this time. CM will continue to follow and 
assist with discharge planning/needs.  
  
  
  
  
  
  
: Emily Whiting
DCP- Discharge Planning
 
Updated by [id number]: Emili Louise on 19   4:23 pm CT
CM attempted to see patient for discharge planning. Patient is currently in 
surgery CM will come back at a later time for d/c planning eval. CM will 
continue to follow and assist as needed with discharge planning / needs.
 DCPIA - Discharge Planning Initial Assessment
 
Updated by [email or web address]: Emily Whiting on 19   4:32 pm
*  Is the patient Alert and Oriented?
Yes
*  How many steps to enter\exit or inside your home? 10/1 *  PCP Dr. Barfield * 
Pharmacy
[facility]
*  Preadmission Environment
Home with Family
*  ADLs
Partial Dependent
*  Partial ADLs (Assistance needed)
Ambulation
*  Equipment
Cane
CPAP
Walker
*  List name and contact numbers for known caregivers / representatives who 
currently or will assist patient after discharge:
Frank Ortiz - [phone] or [phone]  Franci - DTR - no phone
 
*  Verbal permission to speak to the caregivers and representatives has been 
obtained from the patient.
Yes
*  Community resources currently utilized
None
*  Additional services required to return to the preadmission environment?
Yes
*  Can the patient safely return to the preadmission environment?
 
Yes
*  Has this patient been hospitalized within the prior 30 days at any 
hospital?
No
 
 
 
 
 
Coverage Notice
 
Reviewer: [email or web address] Obdulio Whiting
 
Notice Issued Date-Time: 2019  16:28
Notice Type: Patient Choice Letter
 
Notice Delivered To: Patient
Relationship to Patient: Self
Representative Name: 
 
Delivery Method: HAND - Hand Delivered
Elba Days:
Prior Verbal Notification: 
 
Recipient Understood Notice: Yes
Recipient Signature: Yes
Med Rec Note Co-signed by Attending:
 
Coverage Notice Comment:  [facility] for [facility]
Reviewer: [id number] Obdulio Whiting
 
Notice Issued Date-Time: 2019  16:28
Notice Type: IM Discharge Notice
 
Notice Delivered To: Patient
Relationship to Patient: Self
Representative Name: 
 
Delivery Method: HAND - Hand Delivered
Elba Days:
Prior Verbal Notification: 
 
Recipient Understood Notice: Yes
Recipient Signature: Yes
Med Rec Note Co-signed by Attending:
 
Coverage Notice Comment:  IMM explained, signed, given, copy placed in Mr
 
Last DP export: 19   3:38 p
Patient Name: MIREYA BENOIT
 
Encounter #: [id number]
Page [id number]
 
 
 
 
 
Electronically Signed by TEO FIELDS on 19 at 1557
 
 
 
 
 
 
**All edits/amendments must be made on the electronic document**
 
DICTATION DATE: 19     : ROSALES  19     
RPT#: [id number]                                DC DATE:        
                                               STATUS: ADM IN  
[facility]
 [facility] [place] [address]
***END OF REPORT***

## 2019-08-29 NOTE — NUR
HAS RESTED WELL THIS SHIFT, IS IN BED AT THIS TIME, EASILY AROUSED, IV TO LEFT
FOREARM IS INFUSING NS  PER ORDERS, SHOWS NO S/S OF ANY ACUTE DISTRESS.
WILL NOTE ANY CHANGE.

## 2019-08-31 NOTE — MORECARE
CASE MANAGEMENT DISCHARGE SUMMARY
 
 
PATIENT: MIREYA BENOIT                  UNIT: [id number]
ACCOUNT#: [id number]                       ADM DATE: 19
AGE: 58     : 64  SEX: F            ROOM/BED: D.2239    
AUTHOR: DIAMOND,DOC                             PHYSICIAN:                               
 
REFERRING PHYSICIAN: JOAQUINA BARFIELD MD    
DATE OF SERVICE: 19
Discharge Plan
 
 
Patient Name: MIREYA BENOIT
Facility: [facility]:[place]
Encounter #: [id number]
Medical Record #: [id number]
: 1960
Planned Disposition: Home with Home Health
Anticipated Discharge Date: 19
 
Discharge Date: 2019
Expected LOS: 5
Initial Reviewer: [email or web address]
Initial Review Date: 2019
Generated: 19   1:06 pm 
Comments
 
DCP- Discharge Planning
 
Updated by [id number]: Emily Whiting on 19   2:52 pm CT
Received discharge orders, she is no longer needing packing. She states she 
no longer needs home health. I called Juliana with [facility] and informed her 
that she no longer wants home health.
DCP- Discharge Planning
 
Updated by [id number]: Emily Whiting on 19   3:35 pm CT
Patient Name: MIREYA BENOIT                                     
Admission Status: ER   
Accout number: [id number]                              
Admission Date: 2019   
: 1960                                                        
Admission Diagnosis:   
Attending: JOAQUINA BARFIELD                                                
Current LOS:  3   
  
Anticipated DC Date: 2019   
Planned Disposition: Home with Home Health   
Primary Insurance: [facility]   
  
 
  
Discharge Planning Comments: CM met with patient to discuss discharge 
planning/needs. She states her daughter (Franci) is living with her at this 
time and would be a teachable care giver for home health to teach dressing 
changes. She states she uses her cane for ambulation, otherwise is 
independent with all ADL's and AIDL's. I discussed availability of rehab, SNF,
 home health and DME. She states she would like to use [facility] for dressing 
changes. She denies other needs at this time. CM will continue to follow and 
assist with discharge planning/needs.  
  
  
  
  
  
  
: Emily Whiting
DCP- Discharge Planning
 
Updated by [id number]: Emili Louise on 19   4:23 pm CT
CM attempted to see patient for discharge planning. Patient is currently in 
surgery CM will come back at a later time for d/c planning eval. CM will 
continue to follow and assist as needed with discharge planning / needs.
 DCPIA - Discharge Planning Initial Assessment
 
Updated by [id number]: Emily Whiting on 19   4:32 pm
*  Is the patient Alert and Oriented?
Yes
*  How many steps to enter\exit or inside your home? 10/1 *  PCP Dr. Barfield * 
Pharmacy
[facility]
*  Preadmission Environment
Home with Family
*  ADLs
Partial Dependent
*  Partial ADLs (Assistance needed)
Ambulation
*  Equipment
Cane
CPAP
Walker
*  List name and contact numbers for known caregivers / representatives who 
currently or will assist patient after discharge:
Frank Ortiz - [phone] or [phone]  Franci - DTR - no phone
 
*  Verbal permission to speak to the caregivers and representatives has been 
obtained from the patient.
Yes
*  Community resources currently utilized
None
*  Additional services required to return to the preadmission environment?
Yes
*  Can the patient safely return to the preadmission environment?
 
Yes
*  Has this patient been hospitalized within the prior 30 days at any 
hospital?
No
 
 
 
 
 
Coverage Notice
 
Reviewer: [email or web address] Obdulio Whiting
 
Notice Issued Date-Time: 2019  16:28
Notice Type: Patient Choice Letter
 
Notice Delivered To: Patient
Relationship to Patient: Self
Representative Name: 
 
Delivery Method: HAND - Hand Delivered
Elba Days:
Prior Verbal Notification: 
 
Recipient Understood Notice: Yes
Recipient Signature: Yes
Med Rec Note Co-signed by Attending:
 
Coverage Notice Comment:  [facility] for [facility]
Reviewer: [email or web address] Obdulio Whiting
 
Notice Issued Date-Time: 2019  16:28
Notice Type: IM Discharge Notice
 
Notice Delivered To: Patient
Relationship to Patient: Self
Representative Name: 
 
Delivery Method: HAND - Hand Delivered
Elba Days:
Prior Verbal Notification: 
 
Recipient Understood Notice: Yes
Recipient Signature: Yes
Med Rec Note Co-signed by Attending:
 
Coverage Notice Comment:  IMM explained, signed, given, copy placed in Mr
 
Last DP export: 19   2:57 p
Patient Name: MIREYA BENOIT
 
Encounter #: [id number]
Page [id number]
 
 
 
 
 
Electronically Signed by TEO FIELDS on 19 at 1206
 
 
 
 
 
 
**All edits/amendments must be made on the electronic document**
 
DICTATION DATE: 19     : ROSALES  196     
RPT#: [id number]                                DC DATE:19
                                               STATUS: DIS IN  
[facility]
 [place] [place] [address]
***END OF REPORT***

## 2019-09-05 LAB — HBA1C MFR BLD HPLC: 14.1 %

## 2019-09-21 ENCOUNTER — HOSPITAL ENCOUNTER (EMERGENCY)
Dept: HOSPITAL 84 - D.ER | Age: 59
Discharge: HOME | End: 2019-09-21
Payer: COMMERCIAL

## 2019-09-21 VITALS — BODY MASS INDEX: 44.41 KG/M2 | HEIGHT: 68 IN | WEIGHT: 293 LBS

## 2019-09-21 VITALS — DIASTOLIC BLOOD PRESSURE: 50 MMHG | SYSTOLIC BLOOD PRESSURE: 128 MMHG

## 2019-09-21 DIAGNOSIS — I10: ICD-10-CM

## 2019-09-21 DIAGNOSIS — E11.9: ICD-10-CM

## 2019-09-21 DIAGNOSIS — R42: Primary | ICD-10-CM

## 2019-09-21 LAB
ALBUMIN SERPL-MCNC: 3.6 G/DL (ref 3.4–5)
ALP SERPL-CCNC: 160 U/L (ref 46–116)
ALT SERPL-CCNC: 20 U/L (ref 10–68)
ANION GAP SERPL CALC-SCNC: 15.5 MMOL/L (ref 8–16)
APTT BLD: 25.3 SECONDS (ref 22.8–39.4)
BASOPHILS NFR BLD AUTO: 0.5 % (ref 0–2)
BILIRUB SERPL-MCNC: 0.3 MG/DL (ref 0.2–1.3)
BUN SERPL-MCNC: 42 MG/DL (ref 7–18)
CALCIUM SERPL-MCNC: 8.2 MG/DL (ref 8.5–10.1)
CHLORIDE SERPL-SCNC: 99 MMOL/L (ref 98–107)
CK MB SERPL-MCNC: 3.7 U/L (ref 0–3.6)
CK SERPL-CCNC: 162 UL (ref 21–215)
CO2 SERPL-SCNC: 27.1 MMOL/L (ref 21–32)
CREAT SERPL-MCNC: 3 MG/DL (ref 0.6–1.3)
EOSINOPHIL NFR BLD: 2 % (ref 0–7)
ERYTHROCYTE [DISTWIDTH] IN BLOOD BY AUTOMATED COUNT: 13.6 % (ref 11.5–14.5)
GLOBULIN SER-MCNC: 3.5 G/L
GLUCOSE SERPL-MCNC: 388 MG/DL (ref 74–106)
HCT VFR BLD CALC: 37.9 % (ref 36–48)
HGB BLD-MCNC: 13 G/DL (ref 12–16)
IMM GRANULOCYTES NFR BLD: 0.5 % (ref 0–5)
INR PPP: 1.01 (ref 0.85–1.17)
LYMPHOCYTES NFR BLD AUTO: 25.6 % (ref 15–50)
MAGNESIUM SERPL-MCNC: 1.8 MG/DL (ref 1.8–2.4)
MCH RBC QN AUTO: 30.7 PG (ref 26–34)
MCHC RBC AUTO-ENTMCNC: 34.3 G/DL (ref 31–37)
MCV RBC: 89.4 FL (ref 80–100)
MONOCYTES NFR BLD: 8.2 % (ref 2–11)
NEUTROPHILS NFR BLD AUTO: 63.2 % (ref 40–80)
OSMOLALITY SERPL CALC.SUM OF ELEC: 302 MOSM/KG (ref 275–300)
PLATELET # BLD: 276 10X3/UL (ref 130–400)
PMV BLD AUTO: 9.5 FL (ref 7.4–10.4)
POTASSIUM SERPL-SCNC: 3.6 MMOL/L (ref 3.5–5.1)
PROT SERPL-MCNC: 7.1 G/DL (ref 6.4–8.2)
PROTHROMBIN TIME: 12.8 SECONDS (ref 11.6–15)
RBC # BLD AUTO: 4.24 10X6/UL (ref 4–5.4)
SODIUM SERPL-SCNC: 138 MMOL/L (ref 136–145)
TROPONIN I SERPL-MCNC: < 0.017 NG/ML (ref 0–0.06)
TSH SERPL-ACNC: 2.77 UIU/ML (ref 0.36–3.74)
WBC # BLD AUTO: 13.2 10X3/UL (ref 4.8–10.8)

## 2019-11-09 ENCOUNTER — HOSPITAL ENCOUNTER (INPATIENT)
Dept: HOSPITAL 84 - D.ER | Age: 59
LOS: 1 days | Discharge: ANOTHER HOSPITAL | DRG: 746 | End: 2019-11-10
Attending: FAMILY MEDICINE | Admitting: FAMILY MEDICINE
Payer: COMMERCIAL

## 2019-11-09 VITALS — DIASTOLIC BLOOD PRESSURE: 59 MMHG | SYSTOLIC BLOOD PRESSURE: 135 MMHG

## 2019-11-09 VITALS
WEIGHT: 284.6 LBS | HEIGHT: 68 IN | WEIGHT: 284.6 LBS | HEIGHT: 68 IN | BODY MASS INDEX: 43.13 KG/M2 | BODY MASS INDEX: 43.13 KG/M2 | BODY MASS INDEX: 43.13 KG/M2

## 2019-11-09 VITALS — DIASTOLIC BLOOD PRESSURE: 56 MMHG | SYSTOLIC BLOOD PRESSURE: 127 MMHG

## 2019-11-09 VITALS — SYSTOLIC BLOOD PRESSURE: 125 MMHG | DIASTOLIC BLOOD PRESSURE: 50 MMHG

## 2019-11-09 VITALS — SYSTOLIC BLOOD PRESSURE: 116 MMHG | DIASTOLIC BLOOD PRESSURE: 51 MMHG

## 2019-11-09 VITALS — DIASTOLIC BLOOD PRESSURE: 57 MMHG | SYSTOLIC BLOOD PRESSURE: 114 MMHG

## 2019-11-09 VITALS — SYSTOLIC BLOOD PRESSURE: 142 MMHG | DIASTOLIC BLOOD PRESSURE: 66 MMHG

## 2019-11-09 DIAGNOSIS — I10: ICD-10-CM

## 2019-11-09 DIAGNOSIS — G82.20: ICD-10-CM

## 2019-11-09 DIAGNOSIS — E66.01: ICD-10-CM

## 2019-11-09 DIAGNOSIS — N76.4: Primary | ICD-10-CM

## 2019-11-09 DIAGNOSIS — L97.519: ICD-10-CM

## 2019-11-09 DIAGNOSIS — E11.621: ICD-10-CM

## 2019-11-09 DIAGNOSIS — G37.3: ICD-10-CM

## 2019-11-09 DIAGNOSIS — E11.40: ICD-10-CM

## 2019-11-09 DIAGNOSIS — E11.65: ICD-10-CM

## 2019-11-09 DIAGNOSIS — E11.51: ICD-10-CM

## 2019-11-09 LAB
ALBUMIN SERPL-MCNC: 2.6 G/DL (ref 3.4–5)
ALP SERPL-CCNC: 162 U/L (ref 46–116)
ALT SERPL-CCNC: 10 U/L (ref 10–68)
ANION GAP SERPL CALC-SCNC: 15.5 MMOL/L (ref 8–16)
APPEARANCE UR: CLEAR
BASOPHILS NFR BLD AUTO: 0.2 % (ref 0–2)
BILIRUB SERPL-MCNC: 0.3 MG/DL (ref 0.2–1.3)
BILIRUB SERPL-MCNC: NEGATIVE MG/DL
BUN SERPL-MCNC: 36 MG/DL (ref 7–18)
CALCIUM SERPL-MCNC: 8.3 MG/DL (ref 8.5–10.1)
CHLORIDE SERPL-SCNC: 90 MMOL/L (ref 98–107)
CK SERPL-CCNC: 158 UL (ref 21–215)
CO2 SERPL-SCNC: 28.5 MMOL/L (ref 21–32)
COLOR UR: YELLOW
CREAT SERPL-MCNC: 1.8 MG/DL (ref 0.6–1.3)
CRP SERPL-MCNC: 13.3 MG/DL (ref 0–0.9)
EOSINOPHIL NFR BLD: 4.2 % (ref 0–7)
ERYTHROCYTE [DISTWIDTH] IN BLOOD BY AUTOMATED COUNT: 13.7 % (ref 11.5–14.5)
GLOBULIN SER-MCNC: 4.4 G/L
GLUCOSE SERPL-MCNC: 1000 MG/DL
GLUCOSE SERPL-MCNC: 476 MG/DL (ref 74–106)
HCT VFR BLD CALC: 32.4 % (ref 36–48)
HGB BLD-MCNC: 10.7 G/DL (ref 12–16)
IMM GRANULOCYTES NFR BLD: 0.4 % (ref 0–5)
KETONES UR STRIP-MCNC: NEGATIVE MG/DL
LYMPHOCYTES NFR BLD AUTO: 18.6 % (ref 15–50)
MCH RBC QN AUTO: 30.1 PG (ref 26–34)
MCHC RBC AUTO-ENTMCNC: 33 G/DL (ref 31–37)
MCV RBC: 91.3 FL (ref 80–100)
MONOCYTES NFR BLD: 7.8 % (ref 2–11)
NEUTROPHILS NFR BLD AUTO: 68.8 % (ref 40–80)
NITRITE UR-MCNC: NEGATIVE MG/ML
OSMOLALITY SERPL CALC.SUM OF ELEC: 289 MOSM/KG (ref 275–300)
PH UR STRIP: 5 [PH] (ref 5–6)
PLATELET # BLD: 306 10X3/UL (ref 130–400)
PMV BLD AUTO: 8.9 FL (ref 7.4–10.4)
POTASSIUM SERPL-SCNC: 4 MMOL/L (ref 3.5–5.1)
PROT SERPL-MCNC: 7 G/DL (ref 6.4–8.2)
PROT UR-MCNC: (no result) MG/DL
RBC # BLD AUTO: 3.55 10X6/UL (ref 4–5.4)
RBC #/AREA URNS HPF: (no result) /HPF (ref 0–5)
SODIUM SERPL-SCNC: 130 MMOL/L (ref 136–145)
SP GR UR STRIP: 1.01 (ref 1–1.02)
UROBILINOGEN UR-MCNC: NORMAL MG/DL
WBC # BLD AUTO: 9.1 10X3/UL (ref 4.8–10.8)
WBC #/AREA URNS HPF: (no result) /HPF

## 2019-11-09 PROCEDURE — 0JBQ0ZZ EXCISION OF RIGHT FOOT SUBCUTANEOUS TISSUE AND FASCIA, OPEN APPROACH: ICD-10-PCS | Performed by: PODIATRIST

## 2019-11-09 PROCEDURE — 0UBM0ZZ EXCISION OF VULVA, OPEN APPROACH: ICD-10-PCS | Performed by: SURGERY

## 2019-11-09 NOTE — NUR
RECEIVED PATIENT. STABLE. MINIMAL COMPLAINTS OF PAIN. INCISION TO THE RIGHT
SIDED DANIEL AREA/GROIN. PACKED WITH KERLEX AND 4X4S OVERLAPPING WITH MESH
UNDERWEAR. RIGHT FOREARM IV RESTARTED WITH NS. PATIENT ALERT AND ORIENTED.
FAMILY AT BEDSIDE. POST OP VITAL SIGNS IN Anabaptism. DENIES FURTHER NEEDS AT THIS
TIME. CALL LIGHT IN REACH. CPOC.

## 2019-11-09 NOTE — NUR
HOUSE SUPERVISOR ON FLOOR. SPOKE WITH HOUSE SUPERVISOR AND CHARGE NURSE ABOUT
PATIENT COMPLAINS OF LEG NUMBNESS. ASKED SUPERVISOR IF THIS NURSE SHOULD CALL
DOCTOR BREVING OVER SITUATION. HOUSE SUPERVISOR STATED NOT TO AT THIS TIME, TO
INSTRUCT PATIENT TO TAKE IT UP WITH DOCTOR WHEN HE MAKES ROUNDS. REASSESSED
PATIENT. SLEEPING WITH NO DISTRESS NOTED. ASSESSED PATIENT AND PATIENT
RESPONDED TO PHYSICAL STIMULI WHEN ASSESSING BILATERAL LOWER EXTREMETIES.
PATIENT STATED "I DIDN'T KNOW YOU WERE THERE." BUT WOKE AND JERKED WHEN
TOUCHING LEGS FOR ASSESSMENT.

## 2019-11-09 NOTE — NUR
PT RESTING IN BED. NO SIGNS OF DISTRESS. IV TO RIGHT FORARM PATENT NO REDNESS
OR TENDERNESS. ON TELEMETRY 64 SR. HAS REDENDDED AREA TO GRION, GARCIA AND RIGHT
GREAT TOE. DRESSING ON TOE CLEAN AND INTACT. DENIES ANY FURTHER NEED AT THIS
TIME. CALL LIGHT IN REACH. BED LOW POSITION. NO FAMILY AT BEDSIDE AT THIS
TIME.

## 2019-11-10 VITALS — DIASTOLIC BLOOD PRESSURE: 74 MMHG | SYSTOLIC BLOOD PRESSURE: 156 MMHG

## 2019-11-10 VITALS — SYSTOLIC BLOOD PRESSURE: 140 MMHG | DIASTOLIC BLOOD PRESSURE: 42 MMHG

## 2019-11-10 VITALS — DIASTOLIC BLOOD PRESSURE: 49 MMHG | SYSTOLIC BLOOD PRESSURE: 100 MMHG

## 2019-11-10 NOTE — NUR
HOUSE SUPERVISOR ON FLOOR. TOLD HS OVER PATIENT COMPLAINTS AND IF THIS NURSE
SHOULD INFORM DOCTOR. HOUSE SUPERVISOR INSTRUCTED THIS NURSE NOT TO AND
INSTRUCTED THIS NURSE TO TELL PATIENT TO "TAKE IT UP WITH THE DOCTOR". WENT
BACK TO ASSESS PATIENT AND PATIENT SLEEPING WITH NO SIGNS OF DISTRESS.
ASSESSED PATIENT BILATERAL LOWER EXTREMETIES WHERE PATIENT WAS COMPLAINING.
PATIENT WOKE AND RESPONDED TO PHYSICAL STIMULI. PATIENT STATED "I DIDN'T KNOW
YOU WERE THERE BUT I FEEL IT." DENIES ANY OTHER DISTRESS AT THIS TIME. CALL
LIGHT IN REACH.

## 2019-11-10 NOTE — NUR
SPOKE WITH TRELL AT PHYSICIAN CALL CENTER FOR [facility] FOR TRANSFER OF PT. NO
FURTHER ACTION NEEDED FROM [facility] AT THIS TIME PER TRELL. WILL WAIT FOR ROOM
NUMBER FOR TRANSFER.

## 2019-11-10 NOTE — NUR
MRI TECH CALLED AND NOTIFIED THIS NURSE OF PATIENT INABILITY TO STAY STILL. DR KILPATRICK PAGED TO NOTIFY OF INABILITY TO PROPERLY COMPLETE MRI DUE TO PT
"UNCONTROLLABLE MOVEMENTS". TELEPHONE ORDERS RECD ARE 1MG IV ATIVAN ONE TIME
NOW. WILL PLACE ORDERS.

## 2019-11-10 NOTE — NUR
PIV TO RIGHT FA LEAKING. PIV REMOVED WITH CATHETER TIP INTACT. DRESSING
APPLIED. 22G PIV RESITED TO LOWER RIGHT FA X 1 ATTEMPT. PT TOLERATED WELL.
BANANA BAG INFUSING WITHOUT DIFFICULTY. BED IS IN THE LOWEST POSITION. CALL
LIGHT AND BEDSIDE TABLE ARE WITHIN REACH. SIDE RAILS X 2. FAMILY AT BEDSIDE.

## 2019-11-10 NOTE — NUR
PT SLEEPING. VITALS SIGNS STABLE. FAXED TRANSFER BACK AGREEMENT AND CALLED
TRANSFER CENTER FOR CONFIRMATION. AROUSED PT TO SIGN FORMS FOR TRANSFER.
COMPLETE ASSESSMENT PER FLOW-SHEET. NO OTHER NEEDS. WILL CONTINUE TO MONITOR.

## 2019-11-10 NOTE — NUR
[facility] ARRIVED TO TRANSPORT PT TO [facility]. PT ALERT & ORIENTED. VITALS
SIGNS STABLE. PT'S PERSONNEL ITEMS WITH CELL PHONE, , GLASSESS AND CANE
CHECKED BY NURSE, EMS AND PT ARE IN BAG AND LEFT FLOOR WITH PT.

## 2019-11-10 NOTE — NUR
ASSESSING PATIENT. PATIENT APPEARS TO HAVE ANXIETY. ASSESSED VITAL SIGNS.
STABLE. SUGARS STABLE. ASSESSED PULSES, BILATERAL PEDAL PULSES PALPABLE.
BILATERAL LOWER EXTREMETIES WARM TO TOUCH AND CAPILLARY REFILL LESS THAN THREE
SECONDS. PATIENT MOVED TO SIDE OF BED AND WANTED TO URINATE. PATIENT STATES
SHE IS HAVING TROUBLE FEELING HER LEGS. ASKED CHARGE NURSE TO COME IN ROOM.
CHARGE NURSE IN ROOM ASSESSED PATIENT. PATIENT STATES "I HAVE NEUROPATHY."
PATIENT ASKED TO CALL SON. ASSISTED PATIENT IN CALLING SON. SPOKE WITH SON
ABOUT VITAL SIGNS AND BLOOD SUGARS. PATIENT TALKED WITH SON FOR SEVERAL
MINUTES. AFTER PHONE CALL HELPED PATIENT READJUST IN BED. PLACED COLD RAG ON
HEAD. PATIENT THANKED. CALL LIGHT IN REACH. CPOC

## 2019-11-10 NOTE — NUR
16 FR DEVINE CATHETER INSERTED USING STERILE TECHNIQUE. 10ML USED TO INFLATE
STABILIZATION BALLOON. 700ML CLEAR YELLOW URINE NOTED TO COLLECTION BAG WITH
CLEAR YELLOW URINE STILL FILLING TUBING. CATHETER TUBING CLAMPED AT THIS TIME.
STAT LOCK INPLACE SECURING DEVINE TUBING. PT TOLERATED WELL. FAMILY IS AT
BEDSIDE. BED IS IN THE LOWEST POSITION. CALL LIGHT AND BEDSIDE TABLE ARE
WITHIN REACH. SIDE RAILS X 2. PT DENIES FURTHER NEEDS.

## 2019-11-10 NOTE — NUR
PT IS RESTING IN BED WTIH EYES OPEN. PT WITH TEARS AND STATING THAT SHE IS
UNABLE TO "FEEL" HER LEGS. PT REPORTS THAT SHE IS UNABLE TO MOVE RLE AND LLE
WILL "JERK". PT DOES NOT RESPOND TO PAINFUL STIMULI TO BLE. PEDAL PULSES ARE
PALPABLE. CAP REFILL TO BLE IS <3. SKIN IS WARM TO TOUCH. NOTED 2ND TOE ON
LEFT FOOT AMPUTATED PT REPORTS "BECAUSE OF STAPH". PT WITH MODERATE LEVEL OF
ANXIETY AND TACHYPNEA STATING "I CAN'T MOVE. I CAN'T FEEL MY LEGS AND I FEEL
LIKE IT IS GOING UP MY BODY. MY TAIL BONE IS KIND OF HURTING". VSS. /65
HEARTRATE SINUS RHYTHM 79. PT IS AAO X 4. DR ENGLISH PAGED AND NOTIFIED OF PT
STATUS. NO NEW ORDERS RECD AT THIS TIME.

## 2019-11-10 NOTE — NUR
PT ENCOURAGED TO TAKE RELAXING BREATHS PT STARTS CRYING STATING "I JUST LOST
MY MOTHER 2 WEEKS AGO AND GARRICK NEVER HAD THIS FEELING BEFORE. IT FEELS LIKE I
AM FLOATING OR LIKE MY BODY IS ON JELL-O". PT IS AAO X 4 AND IS TALING ON CELL
PHONE TO FAMILY MEMBER PER PT. PT NOTIFIED THAT DR KILPATRICK IS ON HIS WAY TO
ASSESS PT. PT THANKS ME FOR ASSISTANCE AND DENIES FURTHER NEEDS AT THIS TIME.
BED IS IN THE LOWEST POSITION. CALL LIGHT AND BEDSIDE TABLE ARE WITHIN REACH.
SIDE RAILS X 2. WILL CONT TO MONITOR.

## 2019-11-10 NOTE — NUR
ATIVAN ADMINISTERED AT MRI WITH MRI TECH AT BEDSIDE. PT TOLERATED WELL. PT
RESPIRATIONS ARE EVEN AND UNLABORED.

## 2019-11-10 NOTE — HP
PATIENT: MIREYA BENOIT                              MEDICAL RECORD: [id number]
ACCOUNT: [id number]                                    LOCATION:D.MS [id number]
: 64                                            ADMISSION DATE: 19
                                                         PCP: JOAQUINA BARFIELD
 
                             HISTORY AND PHYSICAL EXAMINATION
 
 
CHIEF COMPLAINT:  Right groin pain and draining wound and uncontrolled diabetes.
 
HISTORY OF PRESENT ILLNESS:  The patient is a 59-year-old  female,
followed by Dr. Radames Barfield, who had increasing erythema and pain in her
right groin for the last week.  She saw him in the office yesterday and was felt
to have cellulitis versus early furuncle.  She had an I and D on the left side
back in August of this year and had a complicated medical course.  She was
placed on clindamycin and consult was made to Dr. Shankar, but the patient's
pain became worse last night.  For that reason, presented to the ED this
morning.  She denies fever and chills, shortness of breath or chest pain.  Her
blood sugar in the ED was over 400, but she was nonketotic.
 
PAST MEDICAL HISTORY:  Cellulitis of the left third finger in 2017. 
Diabetic foot ulcer, right great toe and left second toe.  Type 2 diabetes
mellitus, poorly controlled with A1c most recently of 14.  Essential
hypertension.  History of asthma . Depression.  Arthritis.  Hyperlipidemia. 
Morbid obesity.  Umbilical hernia.  Coronary artery disease with a stent in the
left circumflex in 2016.  PTCA of the LAD in 2016.  Total
occlusion of the RCA remotely.  Recurrent staph infections of her skin.
 
ALLERGIES:  None known.
 
FAMILY HISTORY:  Mother recently , had history of CAD, chronic pain syndrome
and  in hospice.  One brother and father both have diabetes.
 
SOCIAL HISTORY:  Former smoker.  Does not use drugs or alcohol.
 
PAST SURGICAL HISTORY:  Cholecystectomy, hysterectomy, I&D of left groin for
Jose Antonio's, cellulitis, PTCA times 2, left and right knee replacement.  She has
had tendon surgery on the right great toe and amputation of the left second toe.
 
HOME MEDICATIONS:  Lantus insulin 60 units in the morning and 100 in the
evening.  Clindamycin 300 mg q.i.d.  Diflucan 150 mg p.o. daily.  Narcan 10/325
one every 6 hours for severe pain.  Januvia 100 mg a day.  Allopurinol 300 mg a
day.  Olanzapine 5 mg tablets at bedtime.  Lisinopril HCT 10/12.5 one p.o. at
bedtime.  Atorvastatin 20 mg at bedtime.  Citalopram 40 mg a day.  Benadryl 25
mg every 4 hours p.r.n. sleep.  Aspirin 81 mg p.o. daily.  Coreg 6.25 mg p.o.
twice a day.  Gabapentin 300 mg 2 capsules by mouth t.i.d.  NovoLog sliding
scale.  Nitrostat 0.4 sublingual p.r.n. chest pain.
 
REVIEW OF SYSTEMS:
GENERAL:  She is chronically fatigued, has been somewhat depressed due to her
mother's recent death.  No recent fever.
HEENT:  No recent visual change, sinus congestion, or sore throat.
RESPIRATORY:  No SOB or cough.
CARDIAC:  No exertional chest pain, claudication, or edema.
GASTROINTESTINAL:  No nausea, vomiting, change in stools, blood per rectum.
GENITOURINARY:  Pain, swelling, and erythema, now draining wound at her right
femoral triangle and groin.  Denies vaginal bleeding.
MUSCULOSKELETAL:  Chronic arthralgia in knees and hips.
 
 
 
HISTORY AND PHYSICAL                           [id number]    MIREYA BENOIT      
 
 
PSYCHIATRIC:  Admits to depressed mood.  No suicidal ideation.
SKIN:  As mentioned above.
 
PHYSICAL EXAMINATION:
VITAL SIGNS:  Temperature 97.9, pulse 77 and regular, respirations are 15, blood
pressure 102/66 with a sat of 97% on room air.
GENERAL:  The patient is alert and oriented times 3.  Eyes are clear. 
Oropharynx unremarkable.
NECK:  Supple.
CHEST:  Distant breath sounds without wheeze or rales.
HEART:  Regular rate and rhythm.
ABDOMEN:  Morbidly obese, soft, nontender throughout.
GENITOURINARY:  She has marked erythema.  A firm right perivulvar abscess that
is draining bloody discharge.
LOWER EXTREMITIES:  Show 2+ pretibial edema bilaterally.  She has amputation of
left second toe.  Her right great toe shows an ulcer on the tip and plantar
aspect.
NEUROLOGIC:  She is oriented to person, place, and time.  Cranial nerves are
intact.  Sensory, decreased sensation to touch in both feet bilaterally.
 
LABORATORY DATA:  White count of 9000, H&H of 10.7 and 32.4, reflecting her
chronic anemia.  Sodium was 130, creatinine is 1.8 and her baseline 1.3, BUN is
36, glucose is 476.  Lactic acid was 2.1.  C-reactive protein is 13.3, alkaline
phosphatase 162.  CT of the abdomen is currently pending.
 
ASSESSMENT:  Probable Jose Antonio cellulitis in right groin, uncontrolled diabetes
mellitus type 2, morbid obesity, CAD clinically stable, chronic pain,
osteoarthritis, hyperlipidemia, depression, diabetic neuropathy.
 
PLAN:  The patient is admitted to the floor, has been placed on IV antibiotics. 
Dr. Shankar of surgery has been consulted.  We will place on high dose sliding
scale insulin.  Further workup pending clinical course.
 
TRANSINT:[id number] Voice Confirmation ID: [id number] DOCUMENT ID: [id number]
 
 
                                           
                                           ALLY KILPATRICK MD           
 
 
 
Electronically Signed by ALLY KILPATRICK on 11/10/19 at 0845
 
 
 
 
 
CC:                                                             [id number]
DICTATION DATE: 19     :     19 0746      ADM IN  
                                                                              
[id number] [place] [id number]

## 2019-11-10 NOTE — NUR
REASSESSED PATIENT. PATIENT COMPLANIED OF LEGS AGAIN. NO CHANGES IN VITAL
SIGNS OR SUGARS AT THIS TIME. BILATERAL EXTREMETIES REMAIN WARM TO TOUCH AND
PATIENT RESPONDS WHEN TOUCHED AT ANKLE AREA. SPOKE WITH CHARGE NURSE, DIXON CARBONE
ABOUT PATIENT AGAIN. CHARGE NURSE STATED ITS POSSIBLE MEDICATIONS THAT HAS
PATIENT UPSET.

## 2019-11-10 NOTE — OP
PATIENT NAME:  MIREYA BENOIT                        MEDICAL RECORD: [id number]
:64                                             LOCATION:D.MS [id number]
                                                         ADMISSION DATE:19
SURGEON:  MARILUZ HORAN MD            
 
 
DATE OF OPERATION:  2019
 
PREOPERATIVE DIAGNOSIS:  Right labial abscess, rule out Jose Antonio's gangrene.
 
POSTOPERATIVE DIAGNOSES:  Right labial abscess without Jose Antonio's gangrene.
 
PROCEDURE:  Excisional debridement of right labial abscess.
 
The dimensions of debridement, including margins, measured 3.5 x 4.0 cm included
skin and subcutaneous tissue as well as a portion of the abscess cavity.
 
I then marsupialized and packed the wound.
 
OPERATIVE COURSE:  The patient was conveyed to the operating room electively on
2019.  General anesthesia was induced by anesthesia staff.  The patient
was placed in the lithotomy position.  The perineum was sterilely prepped and
draped.  Utilizing scalpel, a roughly circular incision was accomplished around
the area that was pointing overlying the right labia majora.
 
I dissected down to a purulent cavity.  Cultures were obtained.  I then excised
the cap.  The dimensions of the excision are listed above.  I excised back to
healthy bleeding tissue.
 
Meticulous hemostasis was achieved with the electrocautery.  I then curetted out
the abscess cavity with some bone curettes.  I marsupialized the wound with a
running locking 3-0 Vicryl Rapide suture.  I irrigated with hydrogen peroxide. 
Meticulous hemostasis was achieved with the electrocautery.  I then packed the
wound with a Kerlix soaked in Dakin's solution.  A sterile dressing was applied.
 
The patient was then extubated and conveyed to post-anesthesia care unit where
she was in stable condition.
 
TRANSINT:[id number] Voice Confirmation ID: [id number] DOCUMENT ID: [id number]
                                           
                                           MARILUZ HORAN MD            
 
 
 
Electronically Signed by MARILUZ HORAN on 11/10/19 at 1112
 
 
 
 
 
CC: JOAQUINA BARFIELD MD                                  [phone]
DICTATION DATE: 19     :     11/10/19 0022      ADM IN  
                                                                              
[id number] [place] [address]

## 2019-11-10 NOTE — NUR
[facility]SFER [facility]ENTER CALLED WITH ROOM NUMBER FOR [facility]. CALLED AND GAVE REPORT TO
LUCIO DEWEY RN. CALLED [facility]. [facility] HAS TRUCK DOWN AND IS SENDING
[facility] TO DO TRANSPORT. [facility] CALLED AND SAID [facility] IS ON THE
WAY.

## 2019-11-10 NOTE — NUR
PT HAS NOT VOIDED DURING THIS SHIFT. SPOKE WITH DR KILPATRICK TELEPHONE ORDERS
RECD TO INSERT DEVINE CATHETER.

## 2019-11-10 NOTE — NUR
TELEPHONE ORDERS RECD FROM DR KILPATRICK ARE ORDER BANANA BAG CONTINUOUS RUNNING
/HR. HALF THE PATIENTS LANTUS DOSE THAT IS ORDERED. ONE TIME DOSE OF VIT
B12 1ML SUBQ. ORDERS PLACED.

## 2019-11-13 ENCOUNTER — HOSPITAL ENCOUNTER (INPATIENT)
Dept: HOSPITAL 84 - D.MS | Age: 59
LOS: 9 days | Discharge: NURSING, REHAB OR LONG-TERM CARE | DRG: 982 | End: 2019-11-22
Attending: FAMILY MEDICINE | Admitting: FAMILY MEDICINE
Payer: COMMERCIAL

## 2019-11-13 VITALS — DIASTOLIC BLOOD PRESSURE: 62 MMHG | SYSTOLIC BLOOD PRESSURE: 166 MMHG

## 2019-11-13 VITALS
BODY MASS INDEX: 43.04 KG/M2 | HEIGHT: 68 IN | BODY MASS INDEX: 43.04 KG/M2 | WEIGHT: 284 LBS | BODY MASS INDEX: 43.04 KG/M2 | WEIGHT: 284 LBS | HEIGHT: 68 IN

## 2019-11-13 VITALS — DIASTOLIC BLOOD PRESSURE: 61 MMHG | SYSTOLIC BLOOD PRESSURE: 169 MMHG

## 2019-11-13 DIAGNOSIS — M86.9: ICD-10-CM

## 2019-11-13 DIAGNOSIS — K31.84: ICD-10-CM

## 2019-11-13 DIAGNOSIS — E11.65: ICD-10-CM

## 2019-11-13 DIAGNOSIS — E11.43: ICD-10-CM

## 2019-11-13 DIAGNOSIS — I25.10: ICD-10-CM

## 2019-11-13 DIAGNOSIS — N18.9: ICD-10-CM

## 2019-11-13 DIAGNOSIS — E66.01: ICD-10-CM

## 2019-11-13 DIAGNOSIS — E11.51: ICD-10-CM

## 2019-11-13 DIAGNOSIS — K59.09: ICD-10-CM

## 2019-11-13 DIAGNOSIS — M50.123: ICD-10-CM

## 2019-11-13 DIAGNOSIS — N76.89: Primary | ICD-10-CM

## 2019-11-13 DIAGNOSIS — E11.69: ICD-10-CM

## 2019-11-13 DIAGNOSIS — L97.519: ICD-10-CM

## 2019-11-13 DIAGNOSIS — E11.22: ICD-10-CM

## 2019-11-13 DIAGNOSIS — I12.9: ICD-10-CM

## 2019-11-13 DIAGNOSIS — E11.621: ICD-10-CM

## 2019-11-13 NOTE — NUR
PT SITTING UP IN BED WITHOUT DISTRESS, AOX4. IV RIGHT FA SL, FLUSHES EASILY.
WEANKESS TO BILAT LOWER EXT. DENIES PAIN AT THIS TIME. CL IN REACH, WILL CTM

## 2019-11-13 NOTE — NUR
PATIENT RECIEVED BACK FORM [facility] FOLLOWING TRANSFER TO THEIR FACILITY 2 DAYS
AGO FOR UNEXPLAINED WEAKNESS TO BLE. PATIENT CONTINUES TO HAVE WEAKNESS WITH
MINIMAL IMPROVEMENT. IV RESTARTED TO RIGHT FOREARM DUE TO UNABLE TO FLUSH IV
PATIENT HAD ON TRANSFER.

## 2019-11-14 VITALS — DIASTOLIC BLOOD PRESSURE: 65 MMHG | SYSTOLIC BLOOD PRESSURE: 143 MMHG

## 2019-11-14 VITALS — SYSTOLIC BLOOD PRESSURE: 116 MMHG | DIASTOLIC BLOOD PRESSURE: 74 MMHG

## 2019-11-14 VITALS — SYSTOLIC BLOOD PRESSURE: 138 MMHG | DIASTOLIC BLOOD PRESSURE: 57 MMHG

## 2019-11-14 VITALS — SYSTOLIC BLOOD PRESSURE: 133 MMHG | DIASTOLIC BLOOD PRESSURE: 66 MMHG

## 2019-11-14 LAB
ANION GAP SERPL CALC-SCNC: 11.1 MMOL/L (ref 8–16)
BASOPHILS NFR BLD AUTO: 0.2 % (ref 0–2)
BUN SERPL-MCNC: 20 MG/DL (ref 7–18)
CALCIUM SERPL-MCNC: 8.4 MG/DL (ref 8.5–10.1)
CHLORIDE SERPL-SCNC: 102 MMOL/L (ref 98–107)
CO2 SERPL-SCNC: 27.8 MMOL/L (ref 21–32)
CREAT SERPL-MCNC: 1.1 MG/DL (ref 0.6–1.3)
EOSINOPHIL NFR BLD: 4.7 % (ref 0–7)
ERYTHROCYTE [DISTWIDTH] IN BLOOD BY AUTOMATED COUNT: 14.2 % (ref 11.5–14.5)
GLUCOSE SERPL-MCNC: 220 MG/DL (ref 74–106)
HCT VFR BLD CALC: 32.4 % (ref 36–48)
HGB BLD-MCNC: 10.7 G/DL (ref 12–16)
IMM GRANULOCYTES NFR BLD: 0.5 % (ref 0–5)
LYMPHOCYTES NFR BLD AUTO: 31.4 % (ref 15–50)
MCH RBC QN AUTO: 30.9 PG (ref 26–34)
MCHC RBC AUTO-ENTMCNC: 33 G/DL (ref 31–37)
MCV RBC: 93.6 FL (ref 80–100)
MONOCYTES NFR BLD: 7.3 % (ref 2–11)
NEUTROPHILS NFR BLD AUTO: 55.9 % (ref 40–80)
OSMOLALITY SERPL CALC.SUM OF ELEC: 283 MOSM/KG (ref 275–300)
PLATELET # BLD: 281 10X3/UL (ref 130–400)
PMV BLD AUTO: 9.1 FL (ref 7.4–10.4)
POTASSIUM SERPL-SCNC: 3.9 MMOL/L (ref 3.5–5.1)
RBC # BLD AUTO: 3.46 10X6/UL (ref 4–5.4)
SODIUM SERPL-SCNC: 137 MMOL/L (ref 136–145)
WBC # BLD AUTO: 8.3 10X3/UL (ref 4.8–10.8)

## 2019-11-14 NOTE — NUR
PT RESTING IN BED. NO SIGNS OF DISTRESS. IV TO RIGHT FORARM PATENT NO REDNESS
OR TENDERNESS. HAS DEVINE NO KINKS PATENT. DENIES ANY FURTHER NEED AT THIS
TIME. CALL LIGHT IN REACH. BED LOW POSITION. NO FAMILY AT BEDSIDE AT THIS
TIME.

## 2019-11-14 NOTE — NUR
PT STATES [facility] PULLED HER DEVINE AT 1PM 11/13/19 AND SHE HAS NO GONE TO
BATHROOM SINCE. ASSISTED PT ONTO BEDPAN TO VOID. COULD NOT VOID BUT FELT URGE
TO GO. LOWER ABD TENDER TO TOUCH, SLIGHLTY DISTENDED. BLADDER SCAN 229ML BUT
PT ALMOST IN TEARS IN PAIN. CALLED DR HSIEH, ORDERS TO PLACE DEVINE. 18F DEVINE
PLACED. 1200ML IMMEDIATE OUTPUT. PT STATES SHE FEELS MUCH BETTER. WILL CTM

## 2019-11-15 VITALS — DIASTOLIC BLOOD PRESSURE: 63 MMHG | SYSTOLIC BLOOD PRESSURE: 109 MMHG

## 2019-11-15 VITALS — DIASTOLIC BLOOD PRESSURE: 60 MMHG | SYSTOLIC BLOOD PRESSURE: 124 MMHG

## 2019-11-15 VITALS — DIASTOLIC BLOOD PRESSURE: 55 MMHG | SYSTOLIC BLOOD PRESSURE: 159 MMHG

## 2019-11-15 VITALS — DIASTOLIC BLOOD PRESSURE: 53 MMHG | SYSTOLIC BLOOD PRESSURE: 120 MMHG

## 2019-11-15 VITALS — DIASTOLIC BLOOD PRESSURE: 64 MMHG | SYSTOLIC BLOOD PRESSURE: 168 MMHG

## 2019-11-15 VITALS — DIASTOLIC BLOOD PRESSURE: 58 MMHG | SYSTOLIC BLOOD PRESSURE: 143 MMHG

## 2019-11-15 LAB
ANION GAP SERPL CALC-SCNC: 11.6 MMOL/L (ref 8–16)
BASOPHILS NFR BLD AUTO: 0.2 % (ref 0–2)
BUN SERPL-MCNC: 24 MG/DL (ref 7–18)
CALCIUM SERPL-MCNC: 8.2 MG/DL (ref 8.5–10.1)
CHLORIDE SERPL-SCNC: 100 MMOL/L (ref 98–107)
CO2 SERPL-SCNC: 30.1 MMOL/L (ref 21–32)
CREAT SERPL-MCNC: 1.4 MG/DL (ref 0.6–1.3)
EOSINOPHIL NFR BLD: 5.4 % (ref 0–7)
ERYTHROCYTE [DISTWIDTH] IN BLOOD BY AUTOMATED COUNT: 14.2 % (ref 11.5–14.5)
GLUCOSE SERPL-MCNC: 219 MG/DL (ref 74–106)
HCT VFR BLD CALC: 33.4 % (ref 36–48)
HGB BLD-MCNC: 10.4 G/DL (ref 12–16)
IMM GRANULOCYTES NFR BLD: 0.9 % (ref 0–5)
LYMPHOCYTES NFR BLD AUTO: 25.4 % (ref 15–50)
MCH RBC QN AUTO: 29.4 PG (ref 26–34)
MCHC RBC AUTO-ENTMCNC: 31.1 G/DL (ref 31–37)
MCV RBC: 94.4 FL (ref 80–100)
MONOCYTES NFR BLD: 7.4 % (ref 2–11)
NEUTROPHILS NFR BLD AUTO: 60.7 % (ref 40–80)
OSMOLALITY SERPL CALC.SUM OF ELEC: 286 MOSM/KG (ref 275–300)
PLATELET # BLD: 361 10X3/UL (ref 130–400)
PMV BLD AUTO: 8.9 FL (ref 7.4–10.4)
POTASSIUM SERPL-SCNC: 3.7 MMOL/L (ref 3.5–5.1)
RBC # BLD AUTO: 3.54 10X6/UL (ref 4–5.4)
SODIUM SERPL-SCNC: 138 MMOL/L (ref 136–145)
WBC # BLD AUTO: 8.8 10X3/UL (ref 4.8–10.8)

## 2019-11-15 NOTE — MORECARE
CASE MANAGEMENT DISCHARGE SUMMARY
 
 
PATIENT: MIREYA BENOIT                  UNIT: [id number]
ACCOUNT#: [id number]                       ADM DATE: 19
AGE: 59     : 64  SEX: F            ROOM/BED: D.2217    
AUTHOR: TEO FIELDS                             PHYSICIAN:                               
 
REFERRING PHYSICIAN: JOAQUINA BARFIELD MD    
DATE OF SERVICE: 11/15/19
Discharge Plan
 
 
Patient Name: MIREYA BENOIT
Facility: [facility]:[place]
Encounter #: [id number]
Medical Record #: [id number]
: 1960
Planned Disposition: Inpatient Rehab
Anticipated Discharge Date: 
 
Discharge Date: 
Expected LOS: 
Initial Reviewer: [id number]
Initial Review Date: 2019
Generated: 11/15/19  11:56 am 
  
 
 
 
 
 
 
 
Patient Name: MIREYA BENOIT
 
Encounter #: [id number]
Page [id number]
 
 
 
 
 
Electronically Signed by TEO FIELDS on 11/15/19 at 1057
 
 
 
 
 
 
**All edits/amendments must be made on the electronic document**
 
DICTATION DATE: 11/15/19 1056     : ROSALES  11/15/19 1056     
RPT#: [id number]                                DC DATE:        
                                               STATUS: ADM IN  
[facility]
 [place] [address]
***END OF REPORT***

## 2019-11-15 NOTE — MORECARE
CASE MANAGEMENT DISCHARGE SUMMARY
 
 
PATIENT: MIREYA BENOIT                  UNIT: [id number]
ACCOUNT#: [id number]                       ADM DATE: 19
AGE: 59     : 64  SEX: F            ROOM/BED: D.2217    
AUTHOR: TEO FIELDS                             PHYSICIAN:                               
 
REFERRING PHYSICIAN: JOAQUINA BARFIELD MD    
DATE OF SERVICE: 11/15/19
Discharge Plan
 
 
Patient Name: MIREYA BENOIT
Facility: [facility]:[place]
Encounter #: [id number]
Medical Record #: [id number]
: 1960
Planned Disposition: Inpatient Rehab
Anticipated Discharge Date: 
 
Discharge Date: 
Expected LOS: 
Initial Reviewer: [id number]
Initial Review Date: 2019
Generated: 11/15/19  12:06 pm 
 DCPIA - Discharge Planning Initial Assessment
 
Updated by [id number]: Jaci Pavon on 11/15/19  11:00 am
*  Is the patient Alert and Oriented?
Yes
*  How many steps to enter\exit or inside your home? RAMP *  PCP [facility] *  Pharmacy
[facility]
*  Preadmission Environment
Home with Family
*  ADLs
Partial Dependent
*  Partial ADLs (Assistance needed)
Ambulation
*  Equipment
Glucometer
Rolling Walker
Shower Chair
*  List name and contact numbers for known caregivers / representatives who 
currently or will assist patient after discharge:
ANDRESSA (SON) [phone]
*  Verbal permission to speak to the caregivers and representatives has been 
obtained from the patient.
N/A
*  Community resources currently utilized
 
None
*  Additional services required to return to the preadmission environment?
Yes
*  Can the patient safely return to the preadmission environment?
No
*  Has this patient been hospitalized within the prior 30 days at any 
hospital?
Yes
 
 
 
 
 
 
 
Last DP export: 11/15/19   9:57 
Patient Name: MIREYA BENOIT
Encounter #: [id number]
Page [id number]
 
 
 
 
 
Electronically Signed by TEO FIELDS on 11/15/19 at 1107
 
 
 
 
 
 
**All edits/amendments must be made on the electronic document**
 
DICTATION DATE: 11/15/19 1106     : ROSALES  11/15/19 1106     
RPT#: [id number]                                DC DATE:        
                                               STATUS: ADM IN  
[facility]
[address] [place] [address]
***END OF REPORT***

## 2019-11-15 NOTE — NUR
Rehab Prescreening Consult recieved and the chart has been reviewed.  She is
[employer] and will require a preauth for the ARU.  Discussed with
the CM Almita Pavon RN.
Aisha Kang RN
Clinical Liaison, Rehab

## 2019-11-15 NOTE — NUR
PT CRYING AND COMPLAINING OF "DEVINE" PAIN, ITCHING AND BURNING. PT DEMANDS THE
DEVINE BE TAKEN OUT. EXPLAINED TO THE PT THAT THE DEVINE WAS PLACED DUE TO HER
INABILITY TO VOID AND THAT WE MAY NOT BE ABLE TO GET HER UP (PT WAS HARD FOR 2
MALES TO GET HER UP ON DAYSHIFT), THAT SHE WOULD POSSIBLY HAVE TO USE BEDPAN.
PT STATED HER ACCEPTANCE AND STATED SHE STILL WANTED DEVINE OUT. REMOVED DEVINE
CATHETER AT THIS TIME AND EMPTIED 800 CC'S URINE FROM CATHETER. GAVE SCHEDULED
MEDS AND PAIN MEDICINE. NO OTHER NEEDS. COMPLETE ASSESSMENT PER FLOW-SHEET.
WILL REASSESS AND CONTINUE TO MONITOR.

## 2019-11-15 NOTE — MORECARE
CASE MANAGEMENT DISCHARGE SUMMARY
 
 
PATIENT: MIREYA BENOIT                  UNIT: [id number]
ACCOUNT#: [id number]                       ADM DATE: 19
AGE: 59     : 64  SEX: F            ROOM/BED: D.2217    
AUTHOR: TEO FIELDS                             PHYSICIAN:                               
 
REFERRING PHYSICIAN: JOAQUINA BARFIELD MD    
DATE OF SERVICE: 11/15/19
Discharge Plan
 
 
Patient Name: MIREYA BENOIT
Facility: [facility]:[place]
Encounter #: [id number]
Medical Record #: [id number]
: 1960
Planned Disposition: Inpatient Rehab
Anticipated Discharge Date: 
 
Discharge Date: 
Expected LOS: 
Initial Reviewer: [id number]
Initial Review Date: 2019
Generated: 11/15/19  12:13 pm 
Comments
 
DCP- Discharge Planning
 
Updated by [id number]: Jaci Pavon on 11/15/19  10:08 am CT
Patient Name: MIREYA BENOIT                                     
Admission Status: Urgent   
Accout number: [id number]                              
Admission Date: 2019   
: 1960                                                        
Admission Diagnosis:   
Attending: JOAQUINA BARFIELD                                                
Current LOS:  2   
  
Anticipated DC Date:    
Planned Disposition: Inpatient Rehab   
Primary Insurance: [employer]   
  
  
Discharge Planning Comments:   
  
CM met with patient to complete initial dc planning assessment.  CM educated 
patient on the CM role and verbal consent given by patient to complete 
assessment.   Patient lives at home with her great niece and her  
 
where she WAS independent with her care.  At discharge patient needs some 
type of rehab & feels this is a safe discharge.  CM discussed availability 
of 
home health, rehab services, and medical equipment. She would like to know 
why her legs are not working. PT and OT are in there, we will wait to see 
what they have to say to have a good discharge plan. She has a walker, shower 
chair, ramp at home. She had a cane, but has lost it between the transfers to 
and from [facility].  She stated when she does get to go home either her son or 
nephew, Kirill will be her  home.  Patient denied known discharge needs 
at this time. CM will continue to follow and will assist as needed with dc 
plans/needs.    
  
  
  
  
: Jaci Pavon
 DCPIA - Discharge Planning Initial Assessment
 
Updated by [id number]: Jaci Pavon on 11/15/19  11:00 am
*  Is the patient Alert and Oriented?
Yes
*  How many steps to enter\exit or inside your home? RAMP *  PCP [facility] *  Pharmacy
[facility]
*  Preadmission Environment
Home with Family
*  ADLs
Partial Dependent
*  Partial ADLs (Assistance needed)
Ambulation
*  Equipment
Glucometer
Rolling Walker
Shower Chair
*  List name and contact numbers for known caregivers / representatives who 
currently or will assist patient after discharge:
ANDRESSA (SON) [phone]
*  Verbal permission to speak to the caregivers and representatives has been 
obtained from the patient.
N/A
*  Community resources currently utilized
None
*  Additional services required to return to the preadmission environment?
Yes
*  Can the patient safely return to the preadmission environment?
No
*  Has this patient been hospitalized within the prior 30 days at any 
hospital?
Yes
 
 
 
 
 
 
 
 
Last DP export: 11/15/19  10:07 
Patient Name: MIREYA BENOIT
Encounter #: [id number]
Page [phone]
 
 
 
 
 
Electronically Signed by TEO FIELDS on 11/15/19 at 1113
 
 
 
 
 
 
**All edits/amendments must be made on the electronic document**
 
DICTATION DATE: 11/15/19 1113     : ROSALES  11/15/19 1113     
RPT#: [id number]                                DC DATE:        
                                               STATUS: ADM IN  
[facility]
 [place] [address]
***END OF REPORT***

## 2019-11-16 VITALS — SYSTOLIC BLOOD PRESSURE: 122 MMHG | DIASTOLIC BLOOD PRESSURE: 62 MMHG

## 2019-11-16 VITALS — DIASTOLIC BLOOD PRESSURE: 50 MMHG | SYSTOLIC BLOOD PRESSURE: 143 MMHG

## 2019-11-16 VITALS — DIASTOLIC BLOOD PRESSURE: 50 MMHG | SYSTOLIC BLOOD PRESSURE: 157 MMHG

## 2019-11-16 VITALS — DIASTOLIC BLOOD PRESSURE: 55 MMHG | SYSTOLIC BLOOD PRESSURE: 105 MMHG

## 2019-11-16 VITALS — DIASTOLIC BLOOD PRESSURE: 51 MMHG | SYSTOLIC BLOOD PRESSURE: 108 MMHG

## 2019-11-16 LAB
ANION GAP SERPL CALC-SCNC: 11.2 MMOL/L (ref 8–16)
BASOPHILS NFR BLD AUTO: 0.2 % (ref 0–2)
BUN SERPL-MCNC: 27 MG/DL (ref 7–18)
CALCIUM SERPL-MCNC: 8.2 MG/DL (ref 8.5–10.1)
CHLORIDE SERPL-SCNC: 101 MMOL/L (ref 98–107)
CO2 SERPL-SCNC: 27.9 MMOL/L (ref 21–32)
CREAT SERPL-MCNC: 1.2 MG/DL (ref 0.6–1.3)
EOSINOPHIL NFR BLD: 4.2 % (ref 0–7)
ERYTHROCYTE [DISTWIDTH] IN BLOOD BY AUTOMATED COUNT: 14.4 % (ref 11.5–14.5)
GLUCOSE SERPL-MCNC: 233 MG/DL (ref 74–106)
HCT VFR BLD CALC: 32 % (ref 36–48)
HGB BLD-MCNC: 10.2 G/DL (ref 12–16)
IMM GRANULOCYTES NFR BLD: 0.7 % (ref 0–5)
LYMPHOCYTES NFR BLD AUTO: 22 % (ref 15–50)
MCH RBC QN AUTO: 29.9 PG (ref 26–34)
MCHC RBC AUTO-ENTMCNC: 31.9 G/DL (ref 31–37)
MCV RBC: 93.8 FL (ref 80–100)
MONOCYTES NFR BLD: 6.9 % (ref 2–11)
NEUTROPHILS NFR BLD AUTO: 66 % (ref 40–80)
OSMOLALITY SERPL CALC.SUM OF ELEC: 283 MOSM/KG (ref 275–300)
PLATELET # BLD: 345 10X3/UL (ref 130–400)
PMV BLD AUTO: 8.9 FL (ref 7.4–10.4)
POTASSIUM SERPL-SCNC: 4.1 MMOL/L (ref 3.5–5.1)
RBC # BLD AUTO: 3.41 10X6/UL (ref 4–5.4)
SODIUM SERPL-SCNC: 136 MMOL/L (ref 136–145)
WBC # BLD AUTO: 9.1 10X3/UL (ref 4.8–10.8)

## 2019-11-16 NOTE — NUR
PT C/O PAIN 8/10. GAVE NORCO-10 PO AND SCHEDULED MEDS. FSBS 404 - TREATED WITH
28 UNITS REG INSULIN PER SS. GAVE SNACK. PT STATES SHE "FEELS MUCH BETTER
TODAY." NO OTHER NEEDS. COMPLETE ASSESSMENT PER FLOW-SHEET. WILL REASSESS AND
CONTINUE TO MONITOR.

## 2019-11-17 VITALS — DIASTOLIC BLOOD PRESSURE: 51 MMHG | SYSTOLIC BLOOD PRESSURE: 140 MMHG

## 2019-11-17 VITALS — DIASTOLIC BLOOD PRESSURE: 45 MMHG | SYSTOLIC BLOOD PRESSURE: 112 MMHG

## 2019-11-17 VITALS — SYSTOLIC BLOOD PRESSURE: 94 MMHG | DIASTOLIC BLOOD PRESSURE: 53 MMHG

## 2019-11-17 VITALS — DIASTOLIC BLOOD PRESSURE: 56 MMHG | SYSTOLIC BLOOD PRESSURE: 148 MMHG

## 2019-11-17 LAB
ANION GAP SERPL CALC-SCNC: 12.5 MMOL/L (ref 8–16)
APPEARANCE UR: CLEAR
BACTERIA #/AREA URNS HPF: (no result) /HPF
BASOPHILS NFR BLD AUTO: 0.1 % (ref 0–2)
BILIRUB SERPL-MCNC: NEGATIVE MG/DL
BUN SERPL-MCNC: 26 MG/DL (ref 7–18)
CALCIUM SERPL-MCNC: 8.4 MG/DL (ref 8.5–10.1)
CHLORIDE SERPL-SCNC: 103 MMOL/L (ref 98–107)
CO2 SERPL-SCNC: 26.7 MMOL/L (ref 21–32)
COLOR UR: (no result)
CREAT SERPL-MCNC: 1.1 MG/DL (ref 0.6–1.3)
EOSINOPHIL NFR BLD: 4 % (ref 0–7)
ERYTHROCYTE [DISTWIDTH] IN BLOOD BY AUTOMATED COUNT: 14.3 % (ref 11.5–14.5)
GLUCOSE SERPL-MCNC: 192 MG/DL (ref 74–106)
GLUCOSE SERPL-MCNC: 250 MG/DL
HCT VFR BLD CALC: 32 % (ref 36–48)
HGB BLD-MCNC: 10.1 G/DL (ref 12–16)
IMM GRANULOCYTES NFR BLD: 0.8 % (ref 0–5)
KETONES UR STRIP-MCNC: NEGATIVE MG/DL
LYMPHOCYTES NFR BLD AUTO: 28.6 % (ref 15–50)
MCH RBC QN AUTO: 29.6 PG (ref 26–34)
MCHC RBC AUTO-ENTMCNC: 31.6 G/DL (ref 31–37)
MCV RBC: 93.8 FL (ref 80–100)
MONOCYTES NFR BLD: 7.3 % (ref 2–11)
NEUTROPHILS NFR BLD AUTO: 59.2 % (ref 40–80)
NITRITE UR-MCNC: NEGATIVE MG/ML
OSMOLALITY SERPL CALC.SUM OF ELEC: 285 MOSM/KG (ref 275–300)
PH UR STRIP: 5 [PH] (ref 5–6)
PLATELET # BLD: 334 10X3/UL (ref 130–400)
PMV BLD AUTO: 8.4 FL (ref 7.4–10.4)
POTASSIUM SERPL-SCNC: 4.2 MMOL/L (ref 3.5–5.1)
PROT UR-MCNC: (no result) MG/DL
RBC # BLD AUTO: 3.41 10X6/UL (ref 4–5.4)
SODIUM SERPL-SCNC: 138 MMOL/L (ref 136–145)
SP GR UR STRIP: 1.01 (ref 1–1.02)
UROBILINOGEN UR-MCNC: NORMAL MG/DL
WBC # BLD AUTO: 8.9 10X3/UL (ref 4.8–10.8)
WBC #/AREA URNS HPF: (no result) /HPF

## 2019-11-17 NOTE — NUR
PT C/O DEVINE IRRITATING HER, STATES SHE HAS A BURNING FEELING THAT IS
UNCOMFORTABLE, DEVINE PLACED YESTERDAY AFTERNOON WITH NO COMPLICATION.
ENCOURAGED PT TO MENTION BURNING SENSATION TO DR WILL GET [facility] IF NEEDED.
CONTINUE WITH PLAN OF CARE. CL IN REACH

## 2019-11-17 NOTE — NUR
FSBS 505 - PT HAS HAD SNACK AND DRINKING COLA. GAVE 28 UNITS REG INSULIN PER
SS AND SCHEDULED 100 UNITS LANTUS. GAVE DIABETIC SNACK. PT C/O PAIN 7/10. GAVE
NORCO-10 1 TAB PO AND SCHEDULED MEDS. NO OTHER NEEDS. ASSESSMENT COMPLETE PER
FLOW-SHEET. WILL REASSESS AND CONTINUE TO MONITOR.

## 2019-11-18 VITALS — DIASTOLIC BLOOD PRESSURE: 51 MMHG | SYSTOLIC BLOOD PRESSURE: 102 MMHG

## 2019-11-18 VITALS — SYSTOLIC BLOOD PRESSURE: 97 MMHG | DIASTOLIC BLOOD PRESSURE: 40 MMHG

## 2019-11-18 VITALS — DIASTOLIC BLOOD PRESSURE: 47 MMHG | SYSTOLIC BLOOD PRESSURE: 135 MMHG

## 2019-11-18 VITALS — SYSTOLIC BLOOD PRESSURE: 127 MMHG | DIASTOLIC BLOOD PRESSURE: 65 MMHG

## 2019-11-18 VITALS — DIASTOLIC BLOOD PRESSURE: 54 MMHG | SYSTOLIC BLOOD PRESSURE: 128 MMHG

## 2019-11-18 VITALS — SYSTOLIC BLOOD PRESSURE: 141 MMHG | DIASTOLIC BLOOD PRESSURE: 46 MMHG

## 2019-11-18 LAB
ANION GAP SERPL CALC-SCNC: 9.9 MMOL/L (ref 8–16)
BASOPHILS NFR BLD AUTO: 0.1 % (ref 0–2)
BUN SERPL-MCNC: 27 MG/DL (ref 7–18)
CALCIUM SERPL-MCNC: 8.1 MG/DL (ref 8.5–10.1)
CHLORIDE SERPL-SCNC: 101 MMOL/L (ref 98–107)
CO2 SERPL-SCNC: 28.8 MMOL/L (ref 21–32)
CREAT SERPL-MCNC: 1 MG/DL (ref 0.6–1.3)
EOSINOPHIL NFR BLD: 4.7 % (ref 0–7)
ERYTHROCYTE [DISTWIDTH] IN BLOOD BY AUTOMATED COUNT: 14.1 % (ref 11.5–14.5)
GLUCOSE SERPL-MCNC: 326 MG/DL (ref 74–106)
HCT VFR BLD CALC: 31.1 % (ref 36–48)
HGB BLD-MCNC: 10.2 G/DL (ref 12–16)
IMM GRANULOCYTES NFR BLD: 0.7 % (ref 0–5)
LYMPHOCYTES NFR BLD AUTO: 29.9 % (ref 15–50)
MCH RBC QN AUTO: 30.4 PG (ref 26–34)
MCHC RBC AUTO-ENTMCNC: 32.8 G/DL (ref 31–37)
MCV RBC: 92.8 FL (ref 80–100)
MONOCYTES NFR BLD: 9.5 % (ref 2–11)
NEUTROPHILS NFR BLD AUTO: 55.1 % (ref 40–80)
OSMOLALITY SERPL CALC.SUM OF ELEC: 287 MOSM/KG (ref 275–300)
PLATELET # BLD: 317 10X3/UL (ref 130–400)
PMV BLD AUTO: 9.2 FL (ref 7.4–10.4)
POTASSIUM SERPL-SCNC: 4.7 MMOL/L (ref 3.5–5.1)
RBC # BLD AUTO: 3.35 10X6/UL (ref 4–5.4)
SODIUM SERPL-SCNC: 135 MMOL/L (ref 136–145)
WBC # BLD AUTO: 7.7 10X3/UL (ref 4.8–10.8)

## 2019-11-18 NOTE — NUR
PATIENT IN BED WITH IV INTACT. NO COMPLAINT OR SIGNS OF DISTRESS. FAMILY AT
BEDSIDE. CALL LIGHT WITHIN REACH.

## 2019-11-18 NOTE — NUR
OT NOTE: PT COMPLETED BED MOB TASKS WITH SPV. PT COMPLETED EOB SITTING WITH
SBA. PT COMPLETED [facility] HYGIENE TASK FACE/HAND WASH WITH SET UP.
THANK YOU, KENIA OTTO

## 2019-11-18 NOTE — NUR
PT C/O OF ITCHING AND BURNING IN PERIAREA. CNA PERFORMED DEVINE CARE. RIGHT
GROIN INCISION MODERATE TAN AND PINK TINGED DRAINAGE. CHANGED DRESSING WET TO
DRY. NO OTHER NEEDS. WILL CONTINUE TO MONITOR.

## 2019-11-18 NOTE — NUR
TRIED TO FIX NORCO CHARTING ON EMAR. DID NOT GIVE AT 1305 WAS ONLY GIVEN AT
1040. UNABLE TO FIX. CALLED PHARMACIST AND THEY STATED THERE WAS NO WAY TO
DELETE IT. WAS UNABLE TO CHART AT 1040 IN HER ROOM ON COMPUTER BECAUSE IT WAS
NOT WORKING AT THAT TIME.

## 2019-11-18 NOTE — NUR
OT NOTE: PT PERFOREMD WELL TODAY. BED MOB WITH SPV; ABLE TO SIT UP ON EOB WITH
GOOD SITTING BALANCE. ABLE TO WASH FACE, HANDS , AND CHEST WITH WASHCLOTH; MOD
ASSIST TO BATHE BACK AND PERINEAL AREA. MAX ASSIST TO DELANO SOCKS. ABLE TO
TRANSFER WITH WALKER AND MOD ASSIST X 2 DUE TO POTENTIAL KNEE BUCKLING. PT WAS
ABLE TO MOVE R FOOT TODAY WITH INCREASED UE SUPPORT.
DI SANTOS, OTR/L

## 2019-11-18 NOTE — NUR
PT SITTING UP ON SIDE OF BED WITHOUT DISTRESS, AOX4. IV LEFT AC SL, FLUSHES
EASILY. DRESSING TO RIGHT GROIN CDI. REMINDED PT SHE IS NPO AFTER MN. CONSENTS
SIGNED FOR SURGERY. DENIES OTHER NEEDS. CL IN REACH, WILL CTM

## 2019-11-19 VITALS — DIASTOLIC BLOOD PRESSURE: 71 MMHG | SYSTOLIC BLOOD PRESSURE: 151 MMHG

## 2019-11-19 VITALS — SYSTOLIC BLOOD PRESSURE: 129 MMHG | DIASTOLIC BLOOD PRESSURE: 64 MMHG

## 2019-11-19 VITALS — SYSTOLIC BLOOD PRESSURE: 154 MMHG | DIASTOLIC BLOOD PRESSURE: 77 MMHG

## 2019-11-19 VITALS — DIASTOLIC BLOOD PRESSURE: 66 MMHG | SYSTOLIC BLOOD PRESSURE: 133 MMHG

## 2019-11-19 VITALS — SYSTOLIC BLOOD PRESSURE: 130 MMHG | DIASTOLIC BLOOD PRESSURE: 64 MMHG

## 2019-11-19 VITALS — DIASTOLIC BLOOD PRESSURE: 67 MMHG | SYSTOLIC BLOOD PRESSURE: 147 MMHG

## 2019-11-19 VITALS — SYSTOLIC BLOOD PRESSURE: 150 MMHG | DIASTOLIC BLOOD PRESSURE: 74 MMHG

## 2019-11-19 VITALS — SYSTOLIC BLOOD PRESSURE: 123 MMHG | DIASTOLIC BLOOD PRESSURE: 64 MMHG

## 2019-11-19 VITALS — SYSTOLIC BLOOD PRESSURE: 162 MMHG | DIASTOLIC BLOOD PRESSURE: 77 MMHG

## 2019-11-19 VITALS — DIASTOLIC BLOOD PRESSURE: 72 MMHG | SYSTOLIC BLOOD PRESSURE: 146 MMHG

## 2019-11-19 VITALS — SYSTOLIC BLOOD PRESSURE: 154 MMHG | DIASTOLIC BLOOD PRESSURE: 74 MMHG

## 2019-11-19 VITALS — DIASTOLIC BLOOD PRESSURE: 62 MMHG | SYSTOLIC BLOOD PRESSURE: 121 MMHG

## 2019-11-19 VITALS — DIASTOLIC BLOOD PRESSURE: 70 MMHG | SYSTOLIC BLOOD PRESSURE: 147 MMHG

## 2019-11-19 VITALS — SYSTOLIC BLOOD PRESSURE: 126 MMHG | DIASTOLIC BLOOD PRESSURE: 63 MMHG

## 2019-11-19 VITALS — DIASTOLIC BLOOD PRESSURE: 66 MMHG | SYSTOLIC BLOOD PRESSURE: 139 MMHG

## 2019-11-19 VITALS — DIASTOLIC BLOOD PRESSURE: 46 MMHG | SYSTOLIC BLOOD PRESSURE: 109 MMHG

## 2019-11-19 VITALS — DIASTOLIC BLOOD PRESSURE: 72 MMHG | SYSTOLIC BLOOD PRESSURE: 155 MMHG

## 2019-11-19 VITALS — SYSTOLIC BLOOD PRESSURE: 127 MMHG | DIASTOLIC BLOOD PRESSURE: 64 MMHG

## 2019-11-19 VITALS — DIASTOLIC BLOOD PRESSURE: 58 MMHG | SYSTOLIC BLOOD PRESSURE: 135 MMHG

## 2019-11-19 LAB
ANION GAP SERPL CALC-SCNC: 11.8 MMOL/L (ref 8–16)
BASOPHILS NFR BLD AUTO: 0.3 % (ref 0–2)
BUN SERPL-MCNC: 27 MG/DL (ref 7–18)
CALCIUM SERPL-MCNC: 8.1 MG/DL (ref 8.5–10.1)
CHLORIDE SERPL-SCNC: 100 MMOL/L (ref 98–107)
CO2 SERPL-SCNC: 26.7 MMOL/L (ref 21–32)
CREAT SERPL-MCNC: 1.1 MG/DL (ref 0.6–1.3)
EOSINOPHIL NFR BLD: 4.2 % (ref 0–7)
ERYTHROCYTE [DISTWIDTH] IN BLOOD BY AUTOMATED COUNT: 14 % (ref 11.5–14.5)
GLUCOSE SERPL-MCNC: 252 MG/DL (ref 74–106)
HCT VFR BLD CALC: 30.7 % (ref 36–48)
HGB BLD-MCNC: 9.9 G/DL (ref 12–16)
IMM GRANULOCYTES NFR BLD: 0.4 % (ref 0–5)
LYMPHOCYTES NFR BLD AUTO: 31.6 % (ref 15–50)
MCH RBC QN AUTO: 29.6 PG (ref 26–34)
MCHC RBC AUTO-ENTMCNC: 32.2 G/DL (ref 31–37)
MCV RBC: 91.6 FL (ref 80–100)
MONOCYTES NFR BLD: 9 % (ref 2–11)
NEUTROPHILS NFR BLD AUTO: 54.5 % (ref 40–80)
OSMOLALITY SERPL CALC.SUM OF ELEC: 281 MOSM/KG (ref 275–300)
PLATELET # BLD: 310 10X3/UL (ref 130–400)
PMV BLD AUTO: 9.1 FL (ref 7.4–10.4)
POTASSIUM SERPL-SCNC: 4.5 MMOL/L (ref 3.5–5.1)
RBC # BLD AUTO: 3.35 10X6/UL (ref 4–5.4)
SODIUM SERPL-SCNC: 134 MMOL/L (ref 136–145)
WBC # BLD AUTO: 6.9 10X3/UL (ref 4.8–10.8)

## 2019-11-19 PROCEDURE — 0RG10A0 FUSION OF CERVICAL VERTEBRAL JOINT WITH INTERBODY FUSION DEVICE, ANTERIOR APPROACH, ANTERIOR COLUMN, OPEN APPROACH: ICD-10-PCS | Performed by: NEUROLOGICAL SURGERY

## 2019-11-19 PROCEDURE — 0RB30ZZ EXCISION OF CERVICAL VERTEBRAL DISC, OPEN APPROACH: ICD-10-PCS | Performed by: NEUROLOGICAL SURGERY

## 2019-11-19 NOTE — NUR
Rehab continues to follow this patient.  She is having surgery today.  She is
[employer] managed [employer] and will require a preauth for the acute rehab. Post
OP she will need to be re-evaluated by PT and OT to submit to the insurance
for their review.
Aisha Kang RN CL

## 2019-11-19 NOTE — NUR
PATEINT RESTING QUIETLY, SNORING, VSS. PATIENT EASILY AWAKENED BY VOICE,
STATES DOES NOT WANT LUNCH TRAY.

## 2019-11-19 NOTE — NUR
----- Message from Jeanne Martin sent at 11/7/2018  3:09 PM EST -----  Regarding: /Telephone  Contact: [phone]  Pt is requesting a refill on  Alprazolam 0.5mg and uses the [facility] on file. Best contact [phone]. Corby Menjivar   Requested Prescriptions     Pending Prescriptions Disp Refills    lisinopril (PRINIVIL, ZESTRIL) 10 mg tablet [Pharmacy Med Name: LISINOPRIL 10MG TABLETS] 90 Tab 5     Sig: TAKE 1 TABLET BY MOUTH DAILY    ALPRAZolam (XANAX) 0.5 mg tablet 60 Tab 0 PATIENT GIVEN DINNER TRAY AND ASSISTED IN SET UP. VSS.

## 2019-11-19 NOTE — NUR
TRANSFERRED TO ICU PER ORDER. PT AWAKE AND RESTING QUIETLY. CALLED
PTS ROOM AND NO FAMILY IN THERE OR IN OUTPATIENT SURGERY WAITING.
WILL LET ICU KNOW AND TRY TO CONTACT THEM FOR ROOM NUMBER CHANGE.

## 2019-11-19 NOTE — MORECARE
CASE MANAGEMENT DISCHARGE SUMMARY
 
 
PATIENT: MIREYA BENOIT                  UNIT: [id number]
ACCOUNT#: [id number]                       ADM DATE: 19
AGE: 59     : [birth year]  SEX: F            ROOM/BED: D.2303    
AUTHOR: TEO FIELDS                             PHYSICIAN:                               
 
REFERRING PHYSICIAN: JOAQUINA BARFIELD MD    
DATE OF SERVICE: 19
Discharge Plan
 
 
Patient Name: MIREYA BENOIT
Facility: [facility]:[place]
Encounter #: [id number]
Medical Record #: [id number]
: 1960
Planned Disposition: Inpatient Rehab
Anticipated Discharge Date: 
 
Discharge Date: 
Expected LOS: 
Initial Reviewer: [email or web address]
Initial Review Date: 2019
Generated: 19   8:47 pm 
DCP- Discharge Planning
 
Updated by [id number]: Jaci Pavon on 11/15/19  10:08 am CT
Patient Name: MIREYA BENOIT                                     
Admission Status: Urgent   
Accout number: [id number]                              
Admission Date: 2019   
: 1960                                                        
Admission Diagnosis:   
Attending: JOAQUINA BARFIELD                                                
Current LOS:  2   
  
Anticipated DC Date:    
Planned Disposition: Inpatient Rehab   
Primary Insurance: [employer]   
  
  
Discharge Planning Comments:   
  
CM met with patient to complete initial dc planning assessment.  CM educated 
patient on the CM role and verbal consent given by patient to complete 
assessment.   Patient lives at home with her great niece and her  
where she WAS independent with her care.  At discharge patient needs some 
type of rehab & feels this is a safe discharge.  CM discussed availability 
 
of 
home health, rehab services, and medical equipment. She would like to know 
why her legs are not working. PT and OT are in there, we will wait to see 
what they have to say to have a good discharge plan. She has a walker, shower 
chair, ramp at home. She had a cane, but has lost it between the transfers to 
and from [facility].  She stated when she does get to go home either her son or 
nephew, Kirill will be her  home.  Patient denied known discharge needs 
at this time. CM will continue to follow and will assist as needed with dc 
plans/needs.    
  
  
  
  
: Jaci Pavon
 DCPIA - Discharge Planning Initial Assessment
 
Updated by [id number]: Jaci Pavon on 11/15/19  11:00 am
*  Is the patient Alert and Oriented?
Yes
*  How many steps to enter\exit or inside your home? RAMP *  PCP LICO *  Pharmacy
[facility]
*  Preadmission Environment
Home with Family
*  ADLs
Partial Dependent
*  Partial ADLs (Assistance needed)
Ambulation
*  Equipment
Glucometer
Rolling Walker
Shower Chair
*  List name and contact numbers for known caregivers / representatives who 
currently or will assist patient after discharge:
ANDRESSA (SON) [phone]
*  Verbal permission to speak to the caregivers and representatives has been 
obtained from the patient.
N/A
*  Community resources currently utilized
None
*  Additional services required to return to the preadmission environment?
Yes
*  Can the patient safely return to the preadmission environment?
No
*  Has this patient been hospitalized within the prior 30 days at any 
hospital?
Yes
 
 
 
 
 
 
 
 
Last DP export: 11/15/19  10:13 
Patient Name: MIREYA BENOIT
Encounter #: [id number]
Page [id number]
 
 
 
 
 
Electronically Signed by TEO FIELDS on 19 at 1947
 
 
 
 
 
 
**All edits/amendments must be made on the electronic document**
 
DICTATION DATE: 19     : ROSALES  19     
RPT#: [id number]                                DC DATE:        
                                               STATUS: ADM IN  
[facility]
[address] [place] [address]
***END OF REPORT***

## 2019-11-19 NOTE — NUR
REASSESSMENT COMPLETE, SEE FLOWSHEET. MEDS GIVEN PER MAR. REPOSITIONED SELF IN
BED, HOB LOWERED PER PT REQUEST. DENIES ANY OTHER NEEDS, CALL LIGHT IN REACH,
BED IN LOWEST POSITION. WILL MONITOR.

## 2019-11-19 NOTE — NUR
MEDS GIVEN PER MAR AND TOLERATED BY PT. REPOSITIONED SELF IN BED. EDUCATION ON
PROTECTING L NECK INCISION COMPLETE. CALL LIGHT IN REACH, WILL MONITOR.

## 2019-11-19 NOTE — NUR
PATIENT RECEIVED FROM PACU VIA BED, ALERT AND ORIENTED X 4, S/P 2 LEVEL ACF.
PATIENT C/O PAIN 7/10 TO NECK, NO SWELLING NOTED, INCISION WELL APPROXIMATED
AND NO DRAINAGE. VSS. SPO2 - 94% ON RA, CM SHOWS NSR RATE 64. BBS - CLEAR AND
EQUAL, DIMINISHE IN THE BASES. IV 20 GA TO LEFT FA, NSL, FLUSHES EASILY WITH
POSITIVE BLOOD RETURN NOTED. DEVINE CATH IN PLACE WITH 1450 ML YELLOW CLEAR
UOP. HEAD TO TOE ASSESSMENT COMPLETED UPON ADMISSION TO ICU.

## 2019-11-19 NOTE — NUR
PATIENT TAKEN TO SURGERYBEFORE REPORT TAKEN, ASSUME PATIENT CARE The patient is a 42y Female complaining of back pain general.

## 2019-11-20 VITALS — DIASTOLIC BLOOD PRESSURE: 56 MMHG | SYSTOLIC BLOOD PRESSURE: 101 MMHG

## 2019-11-20 VITALS — SYSTOLIC BLOOD PRESSURE: 100 MMHG | DIASTOLIC BLOOD PRESSURE: 64 MMHG

## 2019-11-20 VITALS — SYSTOLIC BLOOD PRESSURE: 116 MMHG | DIASTOLIC BLOOD PRESSURE: 59 MMHG

## 2019-11-20 VITALS — SYSTOLIC BLOOD PRESSURE: 134 MMHG | DIASTOLIC BLOOD PRESSURE: 67 MMHG

## 2019-11-20 VITALS — DIASTOLIC BLOOD PRESSURE: 58 MMHG | SYSTOLIC BLOOD PRESSURE: 99 MMHG

## 2019-11-20 VITALS — DIASTOLIC BLOOD PRESSURE: 64 MMHG | SYSTOLIC BLOOD PRESSURE: 171 MMHG

## 2019-11-20 VITALS — SYSTOLIC BLOOD PRESSURE: 141 MMHG | DIASTOLIC BLOOD PRESSURE: 65 MMHG

## 2019-11-20 VITALS — SYSTOLIC BLOOD PRESSURE: 109 MMHG | DIASTOLIC BLOOD PRESSURE: 60 MMHG

## 2019-11-20 VITALS — SYSTOLIC BLOOD PRESSURE: 138 MMHG | DIASTOLIC BLOOD PRESSURE: 61 MMHG

## 2019-11-20 VITALS — SYSTOLIC BLOOD PRESSURE: 97 MMHG | DIASTOLIC BLOOD PRESSURE: 55 MMHG

## 2019-11-20 VITALS — DIASTOLIC BLOOD PRESSURE: 56 MMHG | SYSTOLIC BLOOD PRESSURE: 116 MMHG

## 2019-11-20 VITALS — SYSTOLIC BLOOD PRESSURE: 145 MMHG | DIASTOLIC BLOOD PRESSURE: 62 MMHG

## 2019-11-20 VITALS — SYSTOLIC BLOOD PRESSURE: 137 MMHG | DIASTOLIC BLOOD PRESSURE: 66 MMHG

## 2019-11-20 VITALS — SYSTOLIC BLOOD PRESSURE: 167 MMHG | DIASTOLIC BLOOD PRESSURE: 89 MMHG

## 2019-11-20 VITALS — SYSTOLIC BLOOD PRESSURE: 133 MMHG | DIASTOLIC BLOOD PRESSURE: 72 MMHG

## 2019-11-20 VITALS — SYSTOLIC BLOOD PRESSURE: 122 MMHG | DIASTOLIC BLOOD PRESSURE: 61 MMHG

## 2019-11-20 VITALS — SYSTOLIC BLOOD PRESSURE: 116 MMHG | DIASTOLIC BLOOD PRESSURE: 55 MMHG

## 2019-11-20 NOTE — NUR
AWAKE AND ALERT. LEFT NECK INCISION INTACT. NO REDNESS OR DRAINAGE. STATES HER
THROAT IS SORE. RIGHT GROIN DRESSING DRY AND INTACT. IV LEFT FOREARM INFUSING
WITH NS AT 5 ML HOUR FIR IVPB. NO REDNESS OR SWELLING. DEVINE CATH PATENT
DRAINING CLEAR YELLOW URINE. MOVING LEGS AND ARMS DENIES ANY NUMBNESS OR
TINGLING IN EXTREMITITES. HEAD OF BED ELEVATED 30 DEGREES.

## 2019-11-20 NOTE — NUR
RECEIVED PATIENT FROM ICU. ALERT AND ORIENTED, VIA BED ACCOMPANIED BY STAFF.
NO C/O PAIN. NO S/S OF ACUTE DISTRESS NOTED. DEVINE CATHETER PRESENT. IV TO
LEFT FOREARM, SL. SITE PATENT WITHOUT REDNESS OR SWELLING. ON CONTACT
ISOLATION FOR WOUND. DENIES ANY NEEDS AT THIS TIME. CALL LIGHT IN REACH. WILL
CONTINUE TO MONITOR.

## 2019-11-20 NOTE — NUR
BREAKFAST SERVED ATE WELL. DR. GAMBOA HERE STATES PATIENT CAN GO TO REHAB TODAY
IF OK WITH PRIMARY PHYSICAN.

## 2019-11-20 NOTE — NUR
COMPLETE BED BATH GIVEN WITH HIBCLENS. PATIENT TOLERATED WELL DEVINE CATH CARE
DONE. RIGHT GROIN WOUND DRESSING REMOVED FOUL ODOR NOTED. HALF DOLLAR SIZE
AREA FLAT NO DEPTH PINK RED IN COLOR YELLOW DRAINAGE NOTED ON DRESSING, CLEAN
WITH NS, WET TO DRY DRESSING APPLIED SECURE WITH TAPE. PATIENT TOLERATED WELL.

## 2019-11-20 NOTE — NUR
ALERT AND ORIENTED, RESTING IN BED EYES OPEN. NO C/O PAIN. NO S/S OF ACUTE
DISTRESS NOTED. CALL LIGHT IN REACH. DENIES ANY NEEDS AT THIS TIME. WILL
CONTINUE TO MONITOR.

## 2019-11-20 NOTE — NUR
Rehab Note- PreAuth initiated & clinicals faxed to Nereida/Mee at
[phone], pending Auth#[id number]. Will continue to await determiniation
for possible inpatient acute rehab stay. Thank you for this referral!
Mayra Goldstein RN
Clinical Liaison, [facility]

## 2019-11-20 NOTE — NUR
Nutrition follow-up:
Pt s/p ACDF
Diet: Consistent CHO
PO Intake 100% of most meals
Labs reviewed; glucose elevated
Will continue to provide food choices with selective menus and honor food
preferences within diet restrictions.
RDN following.

## 2019-11-20 NOTE — NUR
Rehab Note- We have been following the patient since referral on 11/15, was
awaiting treatment plan. She has [employer] insurance and will require a PreAuth
so we will begin the PreAuth process. She has a pending PT & OT Evals post
procedure, will also need these for PreAuth process. WIll follow at this time.
Thank you for this referral!
Mayra Goldstein RN
Clinical Liaison, [facility]

## 2019-11-21 VITALS — DIASTOLIC BLOOD PRESSURE: 61 MMHG | SYSTOLIC BLOOD PRESSURE: 136 MMHG

## 2019-11-21 VITALS — DIASTOLIC BLOOD PRESSURE: 72 MMHG | SYSTOLIC BLOOD PRESSURE: 160 MMHG

## 2019-11-21 VITALS — SYSTOLIC BLOOD PRESSURE: 125 MMHG | DIASTOLIC BLOOD PRESSURE: 66 MMHG

## 2019-11-21 VITALS — SYSTOLIC BLOOD PRESSURE: 112 MMHG | DIASTOLIC BLOOD PRESSURE: 67 MMHG

## 2019-11-21 VITALS — SYSTOLIC BLOOD PRESSURE: 128 MMHG | DIASTOLIC BLOOD PRESSURE: 73 MMHG

## 2019-11-21 VITALS — DIASTOLIC BLOOD PRESSURE: 55 MMHG | SYSTOLIC BLOOD PRESSURE: 101 MMHG

## 2019-11-21 LAB
ALBUMIN SERPL-MCNC: 2.3 G/DL (ref 3.4–5)
ALP SERPL-CCNC: 106 U/L (ref 46–116)
ALT SERPL-CCNC: 15 U/L (ref 10–68)
ANION GAP SERPL CALC-SCNC: 10.3 MMOL/L (ref 8–16)
BASOPHILS NFR BLD AUTO: 0.1 % (ref 0–2)
BILIRUB SERPL-MCNC: 0.27 MG/DL (ref 0.2–1.3)
BUN SERPL-MCNC: 32 MG/DL (ref 7–18)
CALCIUM SERPL-MCNC: 8.3 MG/DL (ref 8.5–10.1)
CHLORIDE SERPL-SCNC: 98 MMOL/L (ref 98–107)
CO2 SERPL-SCNC: 28.3 MMOL/L (ref 21–32)
CREAT SERPL-MCNC: 1.3 MG/DL (ref 0.6–1.3)
EOSINOPHIL NFR BLD: 3.5 % (ref 0–7)
ERYTHROCYTE [DISTWIDTH] IN BLOOD BY AUTOMATED COUNT: 14.1 % (ref 11.5–14.5)
GLOBULIN SER-MCNC: 3.9 G/L
GLUCOSE SERPL-MCNC: 326 MG/DL (ref 74–106)
HCT VFR BLD CALC: 30.4 % (ref 36–48)
HGB BLD-MCNC: 9.6 G/DL (ref 12–16)
IMM GRANULOCYTES NFR BLD: 0.5 % (ref 0–5)
LYMPHOCYTES NFR BLD AUTO: 26.3 % (ref 15–50)
MCH RBC QN AUTO: 29.4 PG (ref 26–34)
MCHC RBC AUTO-ENTMCNC: 31.6 G/DL (ref 31–37)
MCV RBC: 93 FL (ref 80–100)
MONOCYTES NFR BLD: 11.7 % (ref 2–11)
NEUTROPHILS NFR BLD AUTO: 57.9 % (ref 40–80)
OSMOLALITY SERPL CALC.SUM OF ELEC: 284 MOSM/KG (ref 275–300)
PLATELET # BLD: 292 10X3/UL (ref 130–400)
PMV BLD AUTO: 9.4 FL (ref 7.4–10.4)
POTASSIUM SERPL-SCNC: 4.6 MMOL/L (ref 3.5–5.1)
PROT SERPL-MCNC: 6.2 G/DL (ref 6.4–8.2)
RBC # BLD AUTO: 3.27 10X6/UL (ref 4–5.4)
SODIUM SERPL-SCNC: 132 MMOL/L (ref 136–145)
WBC # BLD AUTO: 7.8 10X3/UL (ref 4.8–10.8)

## 2019-11-21 NOTE — NUR
PT FSBS HAS BEEN ELEVATED TODAY. FOUND PT ONGLYZA TABLET IN THE BED. DISPOSED
OF TABLET DUE TO PROXIMITY OF NEXT DOSE.

## 2019-11-21 NOTE — NUR
PT ALERT X 4. BREATH SOUNDS CLEAR BILAT. BOWEL SOUNDS HYPO X 4. IV TO LEFT
FOREARM, SALINE AJ. PT REPORTING PAIN OF 5/10, MEDICATED PER ORDERS, WILL
MONITOR. REDDENED AREA TO ABDOMINAL FOLD. DRESSING TO LABIAL AREA CDI. BED
LOW, CALL LIGHT IN REACH. NO OTHER NEEDS AT THIS TIME.

## 2019-11-21 NOTE — NUR
OT NOTE:  PT PERFORMED VERY WELL TODAY. PT DESPERATELY WANTING TO GET BETTER
SO THAT SHE CAN GO HOME AND TAKE CARE OF HERSELF AND HER BILLS. PT ABLE TO
PERFORM UE/LE AROM EXS IN AM.... IN PM, PT ABLE TO DOFF AND DELANO SOCKS. ABLE
TO PERFORM FEEDING WITH SET UP OF TRAY. ABLE TO WASH FACE AND HANDS WITH WASH
CLOTH. MIN ASSIST WITH UE DRESSING BUT UNABLE TO FASTEN SNAPS ON GOWN. UNABLE
TO OPEN CAN OF DRINK OR CERTAIN PKGS DUE TO WEAKNESS IN HANDS AND DECREASED
FINE MOTOR SKILLS. PERFORMED SIT TO STAND WITH WALKER AND MOD ASSIST X 4
TRIALS. AMB WITH OT AND PT WITH WALKER AND GAIT BELT AND MOD ASSIST X 40 FT.
PT REMAINS WEAK IN B UE/LE . PT IS VERY MOTIVATED AND WILL DO WELL IN IP
REHAB.
DI SANTOS,  OTR/L

## 2019-11-21 NOTE — NUR
OT NOTE: PT COMPLETED ADL MOB WITH CGA. PT COMPLETED BED MOB WITH CGA/MIN A.
PT COMPLETED UE AROM AXS. PT COMPLETED HYGIENE TASK WITH MIN A.
THANK YOU, KENIA OTTO

## 2019-11-22 ENCOUNTER — HOSPITAL ENCOUNTER (INPATIENT)
Dept: HOSPITAL 84 - D.REHAB | Age: 59
LOS: 13 days | Discharge: HOME HEALTH | DRG: 98 | End: 2019-12-05
Attending: FAMILY MEDICINE | Admitting: FAMILY MEDICINE
Payer: MEDICARE

## 2019-11-22 VITALS — DIASTOLIC BLOOD PRESSURE: 60 MMHG | SYSTOLIC BLOOD PRESSURE: 164 MMHG

## 2019-11-22 VITALS — DIASTOLIC BLOOD PRESSURE: 64 MMHG | SYSTOLIC BLOOD PRESSURE: 174 MMHG

## 2019-11-22 VITALS — SYSTOLIC BLOOD PRESSURE: 146 MMHG | DIASTOLIC BLOOD PRESSURE: 67 MMHG

## 2019-11-22 VITALS
WEIGHT: 284 LBS | WEIGHT: 284 LBS | BODY MASS INDEX: 43.04 KG/M2 | BODY MASS INDEX: 43.04 KG/M2 | HEIGHT: 68 IN | HEIGHT: 68 IN | BODY MASS INDEX: 43.04 KG/M2

## 2019-11-22 VITALS — SYSTOLIC BLOOD PRESSURE: 139 MMHG | DIASTOLIC BLOOD PRESSURE: 64 MMHG

## 2019-11-22 DIAGNOSIS — E11.65: ICD-10-CM

## 2019-11-22 DIAGNOSIS — R53.83: ICD-10-CM

## 2019-11-22 DIAGNOSIS — R33.9: ICD-10-CM

## 2019-11-22 DIAGNOSIS — D64.9: ICD-10-CM

## 2019-11-22 DIAGNOSIS — E87.1: ICD-10-CM

## 2019-11-22 DIAGNOSIS — R53.1: ICD-10-CM

## 2019-11-22 DIAGNOSIS — E11.43: ICD-10-CM

## 2019-11-22 DIAGNOSIS — G04.89: Primary | ICD-10-CM

## 2019-11-22 DIAGNOSIS — Z73.6: ICD-10-CM

## 2019-11-22 DIAGNOSIS — Z48.811: ICD-10-CM

## 2019-11-22 DIAGNOSIS — E11.621: ICD-10-CM

## 2019-11-22 DIAGNOSIS — E11.69: ICD-10-CM

## 2019-11-22 DIAGNOSIS — I25.10: ICD-10-CM

## 2019-11-22 DIAGNOSIS — M50.123: ICD-10-CM

## 2019-11-22 DIAGNOSIS — M86.9: ICD-10-CM

## 2019-11-22 DIAGNOSIS — L97.519: ICD-10-CM

## 2019-11-22 DIAGNOSIS — I10: ICD-10-CM

## 2019-11-22 DIAGNOSIS — E66.01: ICD-10-CM

## 2019-11-22 DIAGNOSIS — N76.89: ICD-10-CM

## 2019-11-22 LAB
%HYPO/RBC NFR BLD AUTO: (no result) %
ALBUMIN SERPL-MCNC: 2.3 G/DL (ref 3.4–5)
ALP SERPL-CCNC: 107 U/L (ref 46–116)
ALT SERPL-CCNC: 16 U/L (ref 10–68)
ANION GAP SERPL CALC-SCNC: 8.2 MMOL/L (ref 8–16)
ANISOCYTOSIS BLD QL SMEAR: (no result)
BILIRUB SERPL-MCNC: 0.25 MG/DL (ref 0.2–1.3)
BUN SERPL-MCNC: 35 MG/DL (ref 7–18)
CALCIUM SERPL-MCNC: 8.7 MG/DL (ref 8.5–10.1)
CHLORIDE SERPL-SCNC: 100 MMOL/L (ref 98–107)
CO2 SERPL-SCNC: 30.2 MMOL/L (ref 21–32)
CREAT SERPL-MCNC: 1.4 MG/DL (ref 0.6–1.3)
EOSINOPHIL NFR BLD: 4 % (ref 0–7)
ERYTHROCYTE [DISTWIDTH] IN BLOOD BY AUTOMATED COUNT: 14 % (ref 11.5–14.5)
GLOBULIN SER-MCNC: 4.1 G/L
GLUCOSE SERPL-MCNC: 299 MG/DL (ref 74–106)
HCT VFR BLD CALC: 30.6 % (ref 36–48)
HGB BLD-MCNC: 9.7 G/DL (ref 12–16)
LYMPHOCYTES NFR BLD AUTO: 24 % (ref 15–50)
MCH RBC QN AUTO: 29.3 PG (ref 26–34)
MCHC RBC AUTO-ENTMCNC: 31.7 G/DL (ref 31–37)
MCV RBC: 92.4 FL (ref 80–100)
MONOCYTES NFR BLD: 8 % (ref 2–11)
NEUTROPHILS NFR BLD AUTO: 62 % (ref 40–80)
OSMOLALITY SERPL CALC.SUM OF ELEC: 286 MOSM/KG (ref 275–300)
PLATELET # BLD EST: NORMAL 10*3/UL
PLATELET # BLD: 260 10X3/UL (ref 130–400)
PMV BLD AUTO: 9.3 FL (ref 7.4–10.4)
POTASSIUM SERPL-SCNC: 4.4 MMOL/L (ref 3.5–5.1)
PROT SERPL-MCNC: 6.4 G/DL (ref 6.4–8.2)
RBC # BLD AUTO: 3.31 10X6/UL (ref 4–5.4)
SMUDGE CELLS # BLD: (no result) 10*3/UL
SODIUM SERPL-SCNC: 134 MMOL/L (ref 136–145)
WBC # BLD AUTO: 6.6 10X3/UL (ref 4.8–10.8)

## 2019-11-22 NOTE — NUR
OT NOTE: PT DOING WELL; ABLE TO AMB TO BATHROOM WITH MIN ASSIST, HOWEVER, GAIT
BELT AND PREPARED FOR MOD ASSIST DUE TO 1 INCIDENT OF LEGS BUCKLING. TOILET
TRANSFER WITH MIN ASSIST; HYGIENE WITH SET UP. PTS GROSS AND FINE MOTOR
COORDINATION ARE IMPROVING. SET UP TO DELANO SOCKS. AMB 12 FT FROM TOIILET TO
OTHER SIDE OF BED IWTH MIN/MOD ASSIST AND WALKER. REQUIRED APPROX 5 MIN REST
BREAK, THEN AMB ANOTHER 50 FT INTO HALLWAY. PT ABLE TO WASH FACE AND HANDS
WITH SET UP. PT EAGER TO GO TO REHAB. WILL PROVIDE THERABAND FOR UE
STRENGTHENING EXS.
DI SANTOS, OTR/L

## 2019-11-22 NOTE — NUR
PT SITTING UP IN WHEELCHAIR. VISITORS IN ROOM. PT ARRIVED TO UNIT ABOUT 1830
AND PT IS ON CONTACT ISOLATION. ISOLATIONS PRECAUTIONS EDUCATION PROVIDED TO
VISITORS AND PROTOCOL WAS FOLLOWED BY THEM. DENIES NEEDS AT THIS TIME. CL IN
REACH. WILL CONTINUE TO MONITOR. A/O X4.

## 2019-11-22 NOTE — NUR
PT TEARFUL FROM PAIN IN HER BLADDER AREA FROM NOT BEING ABLE TO URINATE. DEVINE
PLACED BACK IN AND 1500CC OF URINE IN DEVINE BAG. STERILE TECHNIQUE USED. 16
GUAGE DEVINE ADMINISTERED. URINE COLOR WNL. NO FOUL ODOR. WILL CONTINUE TO
MONITOR. CL IN REACH.

## 2019-11-22 NOTE — MORECARE
CASE MANAGEMENT DISCHARGE SUMMARY
 
 
PATIENT: MIREYA BENOIT                  UNIT: [id number]
ACCOUNT#: [id number]                       ADM DATE: 19
AGE: 59     : 64  SEX: F            ROOM/BED: D.2236    
AUTHOR: TEO FIELDS                             PHYSICIAN:                               
 
REFERRING PHYSICIAN: JOAQUINA BARFIELD MD    
DATE OF SERVICE: 19
Discharge Plan
 
 
Patient Name: MIREYA BENOIT
Facility: [facility]:[place]
Encounter #: [id number]
Medical Record #: [id number]
: 1960
Planned Disposition: Inpatient Rehab
Anticipated Discharge Date: 
 
Discharge Date: 
Expected LOS: 
Initial Reviewer: [id number]
Initial Review Date: 2019
Generated: 19   2:26 pm 
Comments
 
DCP- Discharge Planning
 
Updated by [id number]: Emily Whiting on 19  12:24 pm CT
Patient Name:  MIREYA BENOIT   
Encounter No:  [id number]   
:  1960   
Primary Insurance:  [employer]  
Anticipated DC Date:    
Planned Disposition:  Inpatient Rehab  
External Planned Provider: :   
  
  
DCP follow-up note: Patient  in agreement with discharge plan. No changes to 
plan. Discharging to inpatient rehab today. Marci in inpatient rehab 
informed. Case management will follow and assist as needed.  
Emily Whiting
DCP- Discharge Planning
 
Updated by [email or web address]: Emily Whiting on 19  10:05 am CT
Aisha from inpatient rehab states they have insurance authorization for 
inpatient rehab. I called and informed Dr. Barfield. He states he will be 
here at lunch time to write discharge orders. I informed the patient and she 
 
is agreeable to discharge to inpatient rehab today.
DCP- Discharge Planning
 
Updated by [id number]: Jaci Pavon on 11/15/19  10:08 am CT
Patient Name: MIREYA BENOIT                                     
Admission Status: Urgent   
Accout number: [id number]                              
Admission Date: 2019   
: 1960                                                        
Admission Diagnosis:   
Attending: JOAQUINA BARFIELD                                                
Current LOS:  2   
  
Anticipated DC Date:    
Planned Disposition: Inpatient Rehab   
Primary Insurance: [employer]   
  
  
Discharge Planning Comments:   
  
CM met with patient to complete initial dc planning assessment.  CM educated 
patient on the CM role and verbal consent given by patient to complete 
assessment.   Patient lives at home with her great niece and her  
where she WAS independent with her care.  At discharge patient needs some 
type of rehab & feels this is a safe discharge.  CM discussed availability 
of 
home health, rehab services, and medical equipment. She would like to know 
why her legs are not working. PT and OT are in there, we will wait to see 
what they have to say to have a good discharge plan. She has a walker, shower 
chair, ramp at home. She had a cane, but has lost it between the transfers to 
and from [facility].  She stated when she does get to go home either her son or 
nephew, Kirill will be her  home.  Patient denied known discharge needs 
at this time. CM will continue to follow and will assist as needed with dc 
plans/needs.    
  
  
  
  
: Jaci Pavon
 DCPIA - Discharge Planning Initial Assessment
 
Updated by [email or web address]: Jaci Pavon on 11/15/19  11:00 am
*  Is the patient Alert and Oriented?
Yes
*  How many steps to enter\exit or inside your home? RAMP *  PCP [facility] *  Pharmacy
[facility]
*  Preadmission Environment
Home with Family
*  ADLs
Partial Dependent
*  Partial ADLs (Assistance needed)
Ambulation
 
*  Equipment
Glucometer
Rolling Walker
Shower Chair
*  List name and contact numbers for known caregivers / representatives who 
currently or will assist patient after discharge:
ANDRESSA (SON) [phone]
*  Verbal permission to speak to the caregivers and representatives has been 
obtained from the patient.
N/A
*  Community resources currently utilized
None
*  Additional services required to return to the preadmission environment?
Yes
*  Can the patient safely return to the preadmission environment?
No
*  Has this patient been hospitalized within the prior 30 days at any 
hospital?
Yes
 
 
 
 
 
Coverage Notice
 
Reviewer: [email or web address] Obdulio Whiting
 
Notice Issued Date-Time: 2019  11:01
Notice Type: IM Discharge Notice
 
Notice Delivered To: Patient
Relationship to Patient: Self
Representative Name: 
 
Delivery Method: HAND - Hand Delivered
Elba Days:
Prior Verbal Notification: 
 
Recipient Understood Notice: Yes
Recipient Signature: Yes
Med Rec Note Co-signed by Attending:
 
Coverage Notice Comment:  IMM explained, signed, given, copy placed in MR
 
Last DP export: 19  10:07 
Patient Name: MIREYA BENOIT
 
Encounter #: [id number]
Page [id number]
 
 
 
 
 
Electronically Signed by TEO FIELDS on 19 at 1326
 
 
 
 
 
 
**All edits/amendments must be made on the electronic document**
 
DICTATION DATE: 19     : ROSALES  196     
RPT#: [id number]                                DC DATE:        
                                               STATUS: ADM IN  
[facility]
 [place] [address]
***END OF REPORT***

## 2019-11-22 NOTE — MORECARE
CASE MANAGEMENT DISCHARGE SUMMARY
 
 
PATIENT: MIREYA BENOIT                  UNIT: [id number]
ACCOUNT#: [id number]                       ADM DATE: 19
AGE: 59     : 64  SEX: F            ROOM/BED: D.2236    
AUTHOR: DIAMOND,DOC                             PHYSICIAN:                               
 
REFERRING PHYSICIAN: JOAQUINA BARFIELD MD    
DATE OF SERVICE: 19
Discharge Plan
 
 
Patient Name: MIREYA BENOIT
Facility: [facility]:[place]
Encounter #: [id number]
Medical Record #: [id number]
: 1960
Planned Disposition: Inpatient Rehab
Anticipated Discharge Date: 
 
Discharge Date: 
Expected LOS: 
Initial Reviewer: [email or web address]
Initial Review Date: 2019
Generated: 19  12:07 pm 
Comments
 
DCP- Discharge Planning
 
Updated by [id number]: Emily Whiting on 19  10:05 am CT
Aisha from inpatient rehab states they have insurance authorization for 
inpatient rehab. I called and informed Dr. Barfield. He states he will be 
here at lunch time to write discharge orders. I informed the patient and she 
is agreeable to discharge to inpatient rehab today.
DCP- Discharge Planning
 
Updated by [id number]: Jaci Pavon on 11/15/19  10:08 am CT
Patient Name: MIREYA BENOIT                                     
Admission Status: Urgent   
Accout number: [id number]                              
Admission Date: 2019   
: 1960                                                        
Admission Diagnosis:   
Attending: JOAQUINA BARFIELD                                                
Current LOS:  2   
  
Anticipated DC Date:    
Planned Disposition: Inpatient Rehab   
[facility]   
 
  
  
Discharge Planning Comments:   
  
CM met with patient to complete initial dc planning assessment.  CM educated 
patient on the CM role and verbal consent given by patient to complete 
assessment.   Patient lives at home with her great niece and her  
where she WAS independent with her care.  At discharge patient needs some 
type of rehab & feels this is a safe discharge.  CM discussed availability 
of 
home health, rehab services, and medical equipment. She would like to know 
why her legs are not working. PT and OT are in there, we will wait to see 
what they have to say to have a good discharge plan. She has a walker, shower 
chair, ramp at home. She had a cane, but has lost it between the transfers to 
and from [facility].  She stated when she does get to go home either her son or 
nephew, Kirill will be her  home.  Patient denied known discharge needs 
at this time. CM will continue to follow and will assist as needed with dc 
plans/needs.    
  
  
  
  
: Jaci Pavon
 DCPIA - Discharge Planning Initial Assessment
 
Updated by [id number]: Jaci Pavon on 11/15/19  11:00 am
*  Is the patient Alert and Oriented?
Yes
*  How many steps to enter\exit or inside your home? RAMP *  PCP LICO *  Pharmacy
[facility]
*  Preadmission Environment
Home with Family
*  ADLs
Partial Dependent
*  Partial ADLs (Assistance needed)
Ambulation
*  Equipment
Glucometer
Rolling Walker
Shower Chair
*  List name and contact numbers for known caregivers / representatives who 
currently or will assist patient after discharge:
ANDRESSA (SON) [phone]
*  Verbal permission to speak to the caregivers and representatives has been 
obtained from the patient.
N/A
*  Community resources currently utilized
None
*  Additional services required to return to the preadmission environment?
Yes
*  Can the patient safely return to the preadmission environment?
No
 
*  Has this patient been hospitalized within the prior 30 days at any 
hospital?
Yes
 
 
 
 
 
Coverage Notice
 
Reviewer: [email or web address] Obdulio Whiting
 
Notice Issued Date-Time: 2019  11:01
Notice Type: IM Discharge Notice
 
Notice Delivered To: Patient
Relationship to Patient: Self
Representative Name: 
 
Delivery Method: HAND - Hand Delivered
Elba Days:
Prior Verbal Notification: 
 
Recipient Understood Notice: Yes
Recipient Signature: Yes
Med Rec Note Co-signed by Attending:
 
Coverage Notice Comment:  IMM explained, signed, given, copy placed in MR
 
Last DP export: 19   6:47 
Patient Name: MIREYA BENOIT
Encounter #: [id number]
Page 60043
 
 
 
 
 
Electronically Signed by TEO FIELDS on 19 at 1107
 
 
 
 
 
 
**All edits/amendments must be made on the electronic document**
 
DICTATION DATE: 19     : ROSALES  19     
RPT#: [id number]                                DC DATE:        
                                               STATUS: ADM IN  
[facility]
 [place] [address]
***END OF REPORT***

## 2019-11-22 NOTE — NUR
PT ATTEMPTED TO URINATE TWICE SINCE 1930 AND HAS NOT HAD ANY SUCCESS. PT IS
IN PAIN FROM NOT BEING ABLE TO URINATE SINCE DEVINE WAS REMOVED EARLIER TODAY
AT 1345. AT 2145 IT WILL BE 8 HRS SINCE PT HAD NOT URINATED WILL CONTINUE TO
MONITOR UNTIL THEN. PT IS ON COMMODE NOW ATTEMPTING TO URINATE.

## 2019-11-22 NOTE — NUR
LEFT FA 20G IV DC'D WITH CATH INTACT. DISCHARGE INSTRUCTIONS GIVEN TO PT. PT
HAS NO FURTHER QUESTIONS. CHART COPY SIGNED. CALLED REPORT TO SIM CARBONE IN
REHAB. PT IS GOING TO ROOM 1116. PT IS GOING TO EAT DINNER THEN TRANSFER
DOWN TO REHAB.

## 2019-11-22 NOTE — NUR
WET TO DRY DRESSING DONE ON RIGHT GROIN ABSCESS. OLD DRESSING HAD A MODERATE
AMOUNT OF BLOOD. DC'D DEVINE CATHETER. PT TOLERATED WELL. PT STATES SHE FELT
LIKE SHE NEEDED TO USE THE BATHROOM. ASSISTED PT TO BATHROOM. STATED TO PT TO
PULL CORD WHEN SHE IS FINISHED. PT VERBALIZED UNDERSTANDING. WILL MONITOR FOR
PT TO URINATE.

## 2019-11-23 VITALS — SYSTOLIC BLOOD PRESSURE: 142 MMHG | DIASTOLIC BLOOD PRESSURE: 61 MMHG

## 2019-11-23 VITALS — SYSTOLIC BLOOD PRESSURE: 174 MMHG | DIASTOLIC BLOOD PRESSURE: 64 MMHG

## 2019-11-23 VITALS — DIASTOLIC BLOOD PRESSURE: 67 MMHG | SYSTOLIC BLOOD PRESSURE: 125 MMHG

## 2019-11-23 LAB
ANION GAP SERPL CALC-SCNC: 11.5 MMOL/L (ref 8–16)
BASOPHILS NFR BLD AUTO: 0.4 % (ref 0–2)
BUN SERPL-MCNC: 35 MG/DL (ref 7–18)
CALCIUM SERPL-MCNC: 9 MG/DL (ref 8.5–10.1)
CHLORIDE SERPL-SCNC: 100 MMOL/L (ref 98–107)
CO2 SERPL-SCNC: 29.4 MMOL/L (ref 21–32)
CREAT SERPL-MCNC: 1.2 MG/DL (ref 0.6–1.3)
EOSINOPHIL NFR BLD: 4.6 % (ref 0–7)
ERYTHROCYTE [DISTWIDTH] IN BLOOD BY AUTOMATED COUNT: 14.1 % (ref 11.5–14.5)
GLUCOSE SERPL-MCNC: 280 MG/DL (ref 74–106)
HCT VFR BLD CALC: 29.2 % (ref 36–48)
HGB BLD-MCNC: 9.4 G/DL (ref 12–16)
IMM GRANULOCYTES NFR BLD: 0.1 % (ref 0–5)
LYMPHOCYTES NFR BLD AUTO: 22 % (ref 15–50)
MCH RBC QN AUTO: 29.8 PG (ref 26–34)
MCHC RBC AUTO-ENTMCNC: 32.2 G/DL (ref 31–37)
MCV RBC: 92.7 FL (ref 80–100)
MONOCYTES NFR BLD: 12.9 % (ref 2–11)
NEUTROPHILS NFR BLD AUTO: 60 % (ref 40–80)
OSMOLALITY SERPL CALC.SUM OF ELEC: 289 MOSM/KG (ref 275–300)
PLATELET # BLD: 248 10X3/UL (ref 130–400)
PMV BLD AUTO: 9.2 FL (ref 7.4–10.4)
POTASSIUM SERPL-SCNC: 4.9 MMOL/L (ref 3.5–5.1)
RBC # BLD AUTO: 3.15 10X6/UL (ref 4–5.4)
SODIUM SERPL-SCNC: 136 MMOL/L (ref 136–145)
WBC # BLD AUTO: 7.4 10X3/UL (ref 4.8–10.8)

## 2019-11-23 NOTE — NUR
HAS BEEN UP WORKING WITH THERAPY THIS AM. WEARS SOFT C-COLLAR WHEN UP F/C
PATENT WITH CLOUDY URINE. DSG INTACT TO RT GROIN. USES WC FOR MOTION ASST

## 2019-11-23 NOTE — NUR
PT SITTING UP IN WHEELCHAIR. CL IN REACH. PT EYES CLOSED. NO DISTRESS NOTED.
CHAIR ALARM ON. WHEELCHAIR LOCKED. DEVINE INTACT, URINE COLOR WNL WITH NO ODOR.
RESP EVEN AND UNLABORED. LUNGS CLEAR. BOWEL ACTIVE X4. WILL CONTINUE TO
MONITOR.

## 2019-11-23 NOTE — NUR
LAYING IN BED RESTING QUIETLY. HAS BEEN UP MOST OF DAY. HAS WORKED WITH
PT AND OT. C/O TO NECK AND GROIN AREA. DSG IN PLACE TO GROIN AREA. WEARS SOFT
C-COLLAR WHEN UP. USES WC FOR NAVIGATION. F/C PATENT WITH CLOUDY YELLOW URINE.
CALL LIGHT IN REACH

## 2019-11-23 NOTE — NUR
ADMISSION ASSESSMENT COMPLETED. DRESSING ON DANIEL AREA WOUND REMOVED AND
CLEANED AND NEW WET TO DRY DRESSING APPLIED. PT. TOLERATED PROCEDURE.

## 2019-11-23 NOTE — MORECARE
CASE MANAGEMENT DISCHARGE SUMMARY
 
 
PATIENT: MIREYA BENOIT                  UNIT: [id number]
ACCOUNT#: [id number]                       ADM DATE: 19
AGE: 59     : [birth year]  SEX: F            ROOM/BED: D.2236    
AUTHOR: TEO FIELDS                             PHYSICIAN:                               
 
REFERRING PHYSICIAN: JOAQUINA BARFIELD MD    
DATE OF SERVICE: 19
Discharge Plan
 
 
Patient Name: MIREYA BENOIT
Facility: [facility]:[place]
Encounter #: [id number]
Medical Record #: [id number]
: 1960
Planned Disposition: Inpatient Rehab
Anticipated Discharge Date: 
 
Discharge Date: 2019
Expected LOS: 
Initial Reviewer: [id number]
Initial Review Date: 2019
Generated: 19   4:23 pm 
Comments
 
DCP- Discharge Planning
 
Updated by [id number]: Emily Whiting on 19  12:24 pm CT
Patient Name:  MIREYA BENOIT   
Encounter No:  [id number]   
:  1960   
Primary Insurance:  NOV[employer]MCR  
Anticipated DC Date:    
Planned Disposition:  Inpatient Rehab  
External Planned Provider: :   
  
  
DCP follow-up note: Patient  in agreement with discharge plan. No changes to 
plan. Discharging to inpatient rehab today. Marci in inpatient rehab 
informed. Case management will follow and assist as needed.  
Emily Whiting
DCP- Discharge Planning
 
Updated by [id number]: Emily Whiting on 19  10:05 am CT
Aisha from inpatient rehab states they have insurance authorization for 
inpatient rehab. I called and informed Dr. Barfield. He states he will be 
here at lunch time to write discharge orders. I informed the patient and she 
 
is agreeable to discharge to inpatient rehab today.
DCP- Discharge Planning
 
Updated by [email or web address]: Jaci Pavon on 11/15/19  10:08 am CT
Patient Name: MIREYA BENOIT                                     
Admission Status: Urgent   
Accout number: [id number]                              
Admission Date: 2019   
: 1960                                                        
Admission Diagnosis:   
Attending: JOAQUINA BARFIELD                                                
Current LOS:  2   
  
Anticipated DC Date:    
Planned Disposition: Inpatient Rehab   
Primary Insurance: NOVASYSMCR   
  
  
Discharge Planning Comments:   
  
CM met with patient to complete initial dc planning assessment.  CM educated 
patient on the CM role and verbal consent given by patient to complete 
assessment.   Patient lives at home with her great niece and her  
where she WAS independent with her care.  At discharge patient needs some 
type of rehab & feels this is a safe discharge.  CM discussed availability 
of 
home health, rehab services, and medical equipment. She would like to know 
why her legs are not working. PT and OT are in there, we will wait to see 
what they have to say to have a good discharge plan. She has a walker, shower 
chair, ramp at home. She had a cane, but has lost it between the transfers to 
and from [facility].  She stated when she does get to go home either her son or 
nephew, Kirill will be her  home.  Patient denied known discharge needs 
at this time. CM will continue to follow and will assist as needed with dc 
plans/needs.    
  
  
  
  
: Jaci Pavon
 DCPIA - Discharge Planning Initial Assessment
 
Updated by [id number]: Jaci Pavon on 11/15/19  11:00 am
*  Is the patient Alert and Oriented?
Yes
*  How many steps to enter\exit or inside your home? RAMP *  PCP [facility] *  Pharmacy
[facility]
*  Preadmission Environment
Home with Family
*  ADLs
Partial Dependent
*  Partial ADLs (Assistance needed)
Ambulation
 
*  Equipment
Glucometer
Rolling Walker
Shower Chair
*  List name and contact numbers for known caregivers / representatives who 
currently or will assist patient after discharge:
ANDRESSA (SON) [phone]
*  Verbal permission to speak to the caregivers and representatives has been 
obtained from the patient.
N/A
*  Community resources currently utilized
None
*  Additional services required to return to the preadmission environment?
Yes
*  Can the patient safely return to the preadmission environment?
No
*  Has this patient been hospitalized within the prior 30 days at any 
hospital?
Yes
 
 
 
 
 
Coverage Notice
 
Reviewer: [email or web address] - Emily Whiting
 
Notice Issued Date-Time: 2019  11:01
Notice Type: IM Discharge Notice
 
Notice Delivered To: Patient
Relationship to Patient: Self
Representative Name: 
 
Delivery Method: HAND - Hand Delivered
Elba Days:
Prior Verbal Notification: 
 
Recipient Understood Notice: Yes
Recipient Signature: Yes
Med Rec Note Co-signed by Attending:
 
Coverage Notice Comment:  IMM explained, signed, given, copy placed in MR
 
Last DP export: 19  12:26 
Patient Name: MIREYA BENOIT
 
Encounter #: [id number]
Page [id number]
 
 
 
 
 
Electronically Signed by TEO FIELDS on 19 at 1523
 
 
 
 
 
 
**All edits/amendments must be made on the electronic document**
 
DICTATION DATE: 19     : ROSALES  19     
RPT#: [id number]                                DC DATE:19
                                               STATUS: DIS IN  
[facility]
[address] [place] [address]
***END OF REPORT***

## 2019-11-24 VITALS — SYSTOLIC BLOOD PRESSURE: 143 MMHG | DIASTOLIC BLOOD PRESSURE: 74 MMHG

## 2019-11-24 VITALS — SYSTOLIC BLOOD PRESSURE: 143 MMHG | DIASTOLIC BLOOD PRESSURE: 72 MMHG

## 2019-11-24 NOTE — NUR
QUIET HOURS. PT SITTING UP IN W/C WATCHING TV. DENIES ANY NEEDS OR PAIN. NO
SIGNS OF ACUTE DISTRESS NOTED. CL IN REACH

## 2019-11-24 NOTE — NUR
PT SITTING UP IN WHEELCHAIR WATCHING TV. CL IN REACH. DENIES NEEDS AT THIS
TIME. PT IS COMPLAINING OF URGENCY AND PAIN LIKE NEEDING TO URINATE BUT PT HAS
DEVINE. WILL CHECK DEVINE FOR KINKS AND IRRIGATE IF NEEDED. URINE COLOR WNL.
RESP EVEN AND UNLABORED. A/O X4. LUNGS CLEAR. BOWEL ACTIVE X4. WILL CONTINUE
TO MONITOR.

## 2019-11-24 NOTE — NUR
PT TEARFUL AND IN PAIN FROM DEVINE CATH, IRRIGATED CATH TWICE AND PT STATED IT
WAS VERY PAINFUL AND ASKED NOT TO DO IT AGAIN. ADJUSTED DEVINE AND PT
COMPLAINED OF PAIN WHEN MOVING CATHETAR. CHARGE NURSE ASSISTED THIS NURSE AND
DECIDED TO REMOVE DEVINE DUE TO PREVIOUS DAY NURSE CLEANING AROUND THE WOUND
AND DEVINE EARLIER TODAY, DEVINE CATH MIGHT HAVE BEEN NOT IN THE BLADDER
ANYMORE. DEVINE REMOVED INTACT. PT HAD 1200CC IN COLLECTION BAG. PT DOES STATE
IT FEELS A LITTLE BETTER JUST FEELS LIKE SHE STILL HAS TO URINATE WILL
CONTINUE TO MONITOR.

## 2019-11-25 VITALS — SYSTOLIC BLOOD PRESSURE: 137 MMHG | DIASTOLIC BLOOD PRESSURE: 58 MMHG

## 2019-11-25 VITALS — SYSTOLIC BLOOD PRESSURE: 104 MMHG | DIASTOLIC BLOOD PRESSURE: 53 MMHG

## 2019-11-25 LAB
ANION GAP SERPL CALC-SCNC: 11.9 MMOL/L (ref 8–16)
BASOPHILS NFR BLD AUTO: 0.4 % (ref 0–2)
BUN SERPL-MCNC: 38 MG/DL (ref 7–18)
CALCIUM SERPL-MCNC: 8.5 MG/DL (ref 8.5–10.1)
CHLORIDE SERPL-SCNC: 100 MMOL/L (ref 98–107)
CO2 SERPL-SCNC: 27.2 MMOL/L (ref 21–32)
CREAT SERPL-MCNC: 1.3 MG/DL (ref 0.6–1.3)
EOSINOPHIL NFR BLD: 9.1 % (ref 0–7)
ERYTHROCYTE [DISTWIDTH] IN BLOOD BY AUTOMATED COUNT: 13.9 % (ref 11.5–14.5)
EST. AVERAGE GLUCOSE BLD GHB EST-MCNC: 349 MG/DL (ref 74–154)
GLUCOSE SERPL-MCNC: 300 MG/DL (ref 74–106)
HCT VFR BLD CALC: 29.7 % (ref 36–48)
HGB BLD-MCNC: 9.4 G/DL (ref 12–16)
IMM GRANULOCYTES NFR BLD: 0 % (ref 0–5)
LYMPHOCYTES NFR BLD AUTO: 26.1 % (ref 15–50)
MCH RBC QN AUTO: 29.2 PG (ref 26–34)
MCHC RBC AUTO-ENTMCNC: 31.6 G/DL (ref 31–37)
MCV RBC: 92.2 FL (ref 80–100)
MONOCYTES NFR BLD: 9.5 % (ref 2–11)
NEUTROPHILS NFR BLD AUTO: 54.9 % (ref 40–80)
OSMOLALITY SERPL CALC.SUM OF ELEC: 287 MOSM/KG (ref 275–300)
PLATELET # BLD: 263 10X3/UL (ref 130–400)
PMV BLD AUTO: 9.2 FL (ref 7.4–10.4)
POTASSIUM SERPL-SCNC: 5.1 MMOL/L (ref 3.5–5.1)
RBC # BLD AUTO: 3.22 10X6/UL (ref 4–5.4)
SODIUM SERPL-SCNC: 134 MMOL/L (ref 136–145)
WBC # BLD AUTO: 4.8 10X3/UL (ref 4.8–10.8)

## 2019-11-25 NOTE — NUR
PATIENT ADMITTED TO REHAB FROM [facility]. DME AT HOME IS A WALKER, SHOWER
CHAIR AND SHE DOES HAVE A RAMP. HER PCP IS DR. BARFIELD AND AT DISCHARGE
SHE PLANS ON RETURNING HOME WITH HER FAMILY. WILL CONTINUE TO FOLLOW WITH
PATIENT

## 2019-11-25 NOTE — NUR
Wound located on right groin measures 3cm x 3cm. There is no depth. The wound
bed is red and beefy.
Recommended puracol plus for daily dressing changes.
Wound care will monitor.

## 2019-11-25 NOTE — NUR
PT SITTING UP IN WHEELCHAIR. CL IN REACH. DENIES NEEDS AT THIS TIME. BED IN
LOW SIDE RAILS X2. A/O X4. LUNGS CLEAR. BOWEL ACTIVE X4. RESP EVEN AND
UNLABORED. WILL CONTINUE TO MONITOR.

## 2019-11-25 NOTE — NUR
PT SITTING IN HER WC IN HER ROOM WATCHING TV. HAD C/O CONSTIPATION AND
INABILITY TO VOID TILL EMPTY. MD NOTIFIED, ORDERS GIVEN. RIGHT BEFORE PT WAS
TO GET MEDS TO HELP WITH BM SHE REPORTED SHE VOIDED TILL EMPTY AND HAD LARGE
BM. SOME DRAINAGE NOTED FROM RT GROIN. WOUND CARE NURSE HAS BEEN CONSULTED. DR LOMBARDI HAS BEEN CONSLULTED FOR LEFT BIG TOE WOUND.

## 2019-11-25 NOTE — NUR
SITTING UP IN WC VISITING WITH FAMILY AND EATING SUPPER. STILL VOIDING AND
HAVING BM'S. DENIES NEED FOR INTERVENTION. WOUND CARE NURSE CAME BY AND SAW PT
AND WROTE ORDERS.

## 2019-11-26 VITALS — SYSTOLIC BLOOD PRESSURE: 102 MMHG | DIASTOLIC BLOOD PRESSURE: 50 MMHG

## 2019-11-26 VITALS — DIASTOLIC BLOOD PRESSURE: 53 MMHG | SYSTOLIC BLOOD PRESSURE: 113 MMHG

## 2019-11-26 NOTE — NUR
GREETED PATIENT AND INTRODUCED MYSELF AS HER NURSE. PATIENT IS SITTING IN
WHEELCHAIR WATCHING TV. RESPIRATIONS EVEN. NO S/S OF DISTRESS. CALL LIGHT IN
REACH. DENIES ANY NEEDS AT THIS TIME.

## 2019-11-26 NOTE — NUR
CLINICAL UPDTAES FAXED TO [employer]. [phone] , AUTH. # [id number] WITH
CONFORMATION RECIEVED.

## 2019-11-26 NOTE — NUR
Nutrition Follow-up:
Pt remains on Isolation at this time. Chart reviewed. Noted wound care note:
DM foot ulcer to right big toe, debridement yesterday. Blood sugars running
higher d/t infection per MD notes. MRSA.
Diet: Diabetic
PO intake: ~89% average x last 9 meals
Last BM: 11/25/19 x 2. Wt: 284# (11/23/19)
Significant meds: bactrim, lantus, onglyza. Labs noted: Glu 251
Continue diabetic diet. Will order Ashish BID to nutritionally aid in wound
healing. RD Following.

## 2019-11-27 VITALS — SYSTOLIC BLOOD PRESSURE: 120 MMHG | DIASTOLIC BLOOD PRESSURE: 57 MMHG

## 2019-11-27 VITALS — DIASTOLIC BLOOD PRESSURE: 57 MMHG | SYSTOLIC BLOOD PRESSURE: 150 MMHG

## 2019-11-27 LAB
ANION GAP SERPL CALC-SCNC: 12.6 MMOL/L (ref 8–16)
BASOPHILS NFR BLD AUTO: 0.3 % (ref 0–2)
BUN SERPL-MCNC: 47 MG/DL (ref 7–18)
CALCIUM SERPL-MCNC: 8.7 MG/DL (ref 8.5–10.1)
CHLORIDE SERPL-SCNC: 102 MMOL/L (ref 98–107)
CO2 SERPL-SCNC: 26 MMOL/L (ref 21–32)
CREAT SERPL-MCNC: 1.7 MG/DL (ref 0.6–1.3)
EOSINOPHIL NFR BLD: 10.1 % (ref 0–7)
ERYTHROCYTE [DISTWIDTH] IN BLOOD BY AUTOMATED COUNT: 14.1 % (ref 11.5–14.5)
GLUCOSE SERPL-MCNC: 164 MG/DL (ref 74–106)
HCT VFR BLD CALC: 29.9 % (ref 36–48)
HGB BLD-MCNC: 9.5 G/DL (ref 12–16)
IMM GRANULOCYTES NFR BLD: 0.2 % (ref 0–5)
LYMPHOCYTES NFR BLD AUTO: 35 % (ref 15–50)
MCH RBC QN AUTO: 29.5 PG (ref 26–34)
MCHC RBC AUTO-ENTMCNC: 31.8 G/DL (ref 31–37)
MCV RBC: 92.9 FL (ref 80–100)
MONOCYTES NFR BLD: 8.5 % (ref 2–11)
NEUTROPHILS NFR BLD AUTO: 45.9 % (ref 40–80)
OSMOLALITY SERPL CALC.SUM OF ELEC: 285 MOSM/KG (ref 275–300)
PLATELET # BLD: 256 10X3/UL (ref 130–400)
PMV BLD AUTO: 9.1 FL (ref 7.4–10.4)
POTASSIUM SERPL-SCNC: 5.6 MMOL/L (ref 3.5–5.1)
RBC # BLD AUTO: 3.22 10X6/UL (ref 4–5.4)
SODIUM SERPL-SCNC: 135 MMOL/L (ref 136–145)
WBC # BLD AUTO: 5.9 10X3/UL (ref 4.8–10.8)

## 2019-11-27 NOTE — NUR
PATIENT IS IN CONTACT ISOLATION. ALERT/ORIENT. CALL LIGHT WITHIN REACH. VOICES
NO NEEDS AT THIS TIME. WILL CONTINUE WITH PLAN OF CARE

## 2019-11-27 NOTE — NUR
DR LOMBARDI INTO SEE PATIENT. CHANGED DRESSING TO RIGHT BIG TOE. STATED THAT IS
PATIENT HAS A SHOWER, DRESSING CAN BE CHANGED. DR LOMBARDI STATED DRESSING CAN
BE CHANGED EVERY TWO TO THREE DAYS. STATED HE WILL COME DOWN AND SEE PATIENT
NEXT WEEK

## 2019-11-27 NOTE — NUR
GREETED PATIENT AND INTRODUCED MYSELF AS HIS NURSE. PATIENT IS SITTING IN
WHEELCHAIR AT THIS TIME. RESPIRATIONS EVEN. NO S/S OF DISTRESS. STATES THAT
PAIN IS 8/10 IN BACK AND SHOULDERS. CALL LIGHT IN REACH.

## 2019-11-27 NOTE — NUR
PT. RESTING QUIETLY WITH EYES CLOSED. RESPIRATIONS EVEN. NO S/S OF DISTRESS.
SR UP X 2. BED IN LOWEST POSITION. CALL LIGHT IN REACH.

## 2019-11-27 NOTE — NUR
CARE TEAM MEETING:
PATIENT DOING WELL IN THERAPY. TENATIVE DSICHARGE DATE IS 12/5/19. WILL
CONTINUE TO FOLLOW WITH PATIENT.

## 2019-11-28 VITALS — DIASTOLIC BLOOD PRESSURE: 67 MMHG | SYSTOLIC BLOOD PRESSURE: 154 MMHG

## 2019-11-28 VITALS — DIASTOLIC BLOOD PRESSURE: 45 MMHG | SYSTOLIC BLOOD PRESSURE: 126 MMHG

## 2019-11-28 LAB
ANION GAP SERPL CALC-SCNC: 13 MMOL/L (ref 8–16)
BUN SERPL-MCNC: 45 MG/DL (ref 7–18)
CALCIUM SERPL-MCNC: 8.3 MG/DL (ref 8.5–10.1)
CHLORIDE SERPL-SCNC: 104 MMOL/L (ref 98–107)
CO2 SERPL-SCNC: 26.5 MMOL/L (ref 21–32)
CREAT SERPL-MCNC: 1.4 MG/DL (ref 0.6–1.3)
GLUCOSE SERPL-MCNC: 146 MG/DL (ref 74–106)
OSMOLALITY SERPL CALC.SUM OF ELEC: 290 MOSM/KG (ref 275–300)
POTASSIUM SERPL-SCNC: 5.5 MMOL/L (ref 3.5–5.1)
SODIUM SERPL-SCNC: 138 MMOL/L (ref 136–145)

## 2019-11-29 VITALS — DIASTOLIC BLOOD PRESSURE: 62 MMHG | SYSTOLIC BLOOD PRESSURE: 151 MMHG

## 2019-11-29 VITALS — SYSTOLIC BLOOD PRESSURE: 124 MMHG | DIASTOLIC BLOOD PRESSURE: 42 MMHG

## 2019-11-29 NOTE — NUR
SITTING UP IN WC IN ROOM FINISHING LUNCH. DENIES NEEDS OR C/O. REMAINS ON
CONTACT ISOLATION. WOUND TO RT GROIN IS STILL DRAINING BUT ODOR IS LESS. NO
S/S WORSENING WOUND CONDITION. SHE USES WC FOR NAVIGATION MOST OF TIME. CALL
LIGHT IN REACH

## 2019-11-29 NOTE — NUR
PT RESTING IN ROOM WITH EYES OPEN. ALERT AND ORIENTED X 3. DENIES ACUTE
DISCOMFORT AT THIS TIME. DIEGO RN IS GOING TO CHANGE HER DRESSING TO HER
GROIN. ISOLATION PRECAUTIONS OBSERVED. SR'S ARE UP X 2 WHILE IN BED. CALL
LIGHT AND BEDSIDE TABLE ARE WITHIN EASY REACH.

## 2019-11-30 VITALS — SYSTOLIC BLOOD PRESSURE: 143 MMHG | DIASTOLIC BLOOD PRESSURE: 51 MMHG

## 2019-11-30 VITALS — DIASTOLIC BLOOD PRESSURE: 72 MMHG | SYSTOLIC BLOOD PRESSURE: 138 MMHG

## 2019-11-30 NOTE — NUR
PT RANG CALL LIGHT TO SAY SOMETHING ON HER BED RAIL BROKE. UPON INSPECTION, I
NOTED THE UPPER LEFT RAIL HAD COME OFF THE LOWER HINGE. PT STATED IT DID NOT
CAUSE HER TO FALL, BUT WAS WORRIED ABOUT IT. BED IMMEDIATELY EXCHANGED. PT
SATISFIED WITH RESULTS.

## 2019-12-01 VITALS — DIASTOLIC BLOOD PRESSURE: 74 MMHG | SYSTOLIC BLOOD PRESSURE: 141 MMHG

## 2019-12-01 VITALS — DIASTOLIC BLOOD PRESSURE: 77 MMHG | SYSTOLIC BLOOD PRESSURE: 148 MMHG

## 2019-12-01 NOTE — NUR
PT. SITTING IN DINNER AND VISITING WITH FAMILY MEMBERS. DENIES ANY NEEDS AT
THIS TIME. CALL LIGHT IN REACH.

## 2019-12-01 NOTE — NUR
GREETED PATIENT AND INTRODUCED MYSELF AS HER NURSE. PATIENT IS LAYING IN BED
RESTING AT THIS TIME. RESPIRATIONS EVEN. NO S/S OF DISTRESS. CALL LIGHT IN
REACH.

## 2019-12-02 VITALS — SYSTOLIC BLOOD PRESSURE: 133 MMHG | DIASTOLIC BLOOD PRESSURE: 56 MMHG

## 2019-12-02 VITALS — SYSTOLIC BLOOD PRESSURE: 140 MMHG | DIASTOLIC BLOOD PRESSURE: 51 MMHG

## 2019-12-02 LAB
ANION GAP SERPL CALC-SCNC: 14.1 MMOL/L (ref 8–16)
BASOPHILS NFR BLD AUTO: 0.2 % (ref 0–2)
BUN SERPL-MCNC: 47 MG/DL (ref 7–18)
CALCIUM SERPL-MCNC: 8.6 MG/DL (ref 8.5–10.1)
CHLORIDE SERPL-SCNC: 104 MMOL/L (ref 98–107)
CO2 SERPL-SCNC: 24 MMOL/L (ref 21–32)
CREAT SERPL-MCNC: 1.2 MG/DL (ref 0.6–1.3)
EOSINOPHIL NFR BLD: 5.8 % (ref 0–7)
ERYTHROCYTE [DISTWIDTH] IN BLOOD BY AUTOMATED COUNT: 14.3 % (ref 11.5–14.5)
GLUCOSE SERPL-MCNC: 162 MG/DL (ref 74–106)
HCT VFR BLD CALC: 30.6 % (ref 36–48)
HGB BLD-MCNC: 9.7 G/DL (ref 12–16)
IMM GRANULOCYTES NFR BLD: 0.2 % (ref 0–5)
LYMPHOCYTES NFR BLD AUTO: 40 % (ref 15–50)
MCH RBC QN AUTO: 29.5 PG (ref 26–34)
MCHC RBC AUTO-ENTMCNC: 31.7 G/DL (ref 31–37)
MCV RBC: 93 FL (ref 80–100)
MONOCYTES NFR BLD: 8.4 % (ref 2–11)
NEUTROPHILS NFR BLD AUTO: 45.4 % (ref 40–80)
OSMOLALITY SERPL CALC.SUM OF ELEC: 287 MOSM/KG (ref 275–300)
PLATELET # BLD: 277 10X3/UL (ref 130–400)
PMV BLD AUTO: 9.2 FL (ref 7.4–10.4)
POTASSIUM SERPL-SCNC: 6.1 MMOL/L (ref 3.5–5.1)
RBC # BLD AUTO: 3.29 10X6/UL (ref 4–5.4)
SODIUM SERPL-SCNC: 136 MMOL/L (ref 136–145)
WBC # BLD AUTO: 4.6 10X3/UL (ref 4.8–10.8)

## 2019-12-02 NOTE — NUR
SITTING UP IN  ROOM FOR LUNCH. HAS BEEN UP ALL MORNING VISITING WITH FAMILY
AND DOIGN THERAPY. DENIES INCREASED PAIN. CALL LIGHT IN REACH

## 2019-12-02 NOTE — NUR
LATE ENTRY FOR 11/27/19,
CLINICAL UPDTES FAXED TO [phone], AUTH . # [id number], FAXED ONE DAYS
NOTE PER CHANNING DUE TO HOLIDAY. CONFORMATION RECIEVED

## 2019-12-02 NOTE — NUR
PATIENT RECEIVED SITTING UP IN WHEELCHAIR AT BEDSIDE. FRIEND IN ROOM.
ASSESMENT & VITAL SIGNS DONE. NO C/O PAIN OR DISTRESS AT THIS TIME. CALL LIGHT
WITHIN REACH. WILL CONTINUE TO MONITOR.

## 2019-12-02 NOTE — NUR
NUTRITION F/U
PT REMAINS IN CONTACT ISOLATION. CHART REVIEWED. 100% INTAKE RECENT MEALS.
BM RECORDED ON 12/1/19. WILL CONTINUE TO PROVIDE DIABETIC DIET, MONITOR PO
INTAKE.
RD FOLLOWING

## 2019-12-02 NOTE — NUR
ASSISTED PT WITH SHOWER. DSG CHANGED TO RT GROIN/DANIEL AREA. WOUND BED IS FLUSH
WITH SKIN AND IS BEEFY RED. NO S/S POOR SKIN ATTACHMENT.

## 2019-12-02 NOTE — NUR
PT OFFERED A SHOWER THIS AM. HER ROOM IS VERY COLD, AND SHE STATED SHE DID
NOT WANT TO TAKE ONE AND FREEZE. THE HEATER IS BLOWING COLD AIR ONLY. WORK
ORDER PLACED. PT STATES SHE WILL ASK THE DAY NURSE TO LET HER SHOWER LATER.

## 2019-12-03 VITALS — SYSTOLIC BLOOD PRESSURE: 144 MMHG | DIASTOLIC BLOOD PRESSURE: 66 MMHG

## 2019-12-03 VITALS — SYSTOLIC BLOOD PRESSURE: 132 MMHG | DIASTOLIC BLOOD PRESSURE: 74 MMHG

## 2019-12-03 LAB
ANION GAP SERPL CALC-SCNC: 13.9 MMOL/L (ref 8–16)
BUN SERPL-MCNC: 42 MG/DL (ref 7–18)
CALCIUM SERPL-MCNC: 8.5 MG/DL (ref 8.5–10.1)
CHLORIDE SERPL-SCNC: 104 MMOL/L (ref 98–107)
CO2 SERPL-SCNC: 25.5 MMOL/L (ref 21–32)
CREAT SERPL-MCNC: 1.4 MG/DL (ref 0.6–1.3)
GLUCOSE SERPL-MCNC: 111 MG/DL (ref 74–106)
OSMOLALITY SERPL CALC.SUM OF ELEC: 287 MOSM/KG (ref 275–300)
POTASSIUM SERPL-SCNC: 5.4 MMOL/L (ref 3.5–5.1)
SODIUM SERPL-SCNC: 138 MMOL/L (ref 136–145)

## 2019-12-03 NOTE — NUR
DSG CHANGED AS ORDERED. WOUND BED IS STILL BEEFY RED. SHE HAS MODERATE
DRAINAGE ON HER DRESSING. NO INCREASED ODOR NOTED. SKIN STILL HEALTHY LOOKING.
CALL LIGHT IN REACH.

## 2019-12-03 NOTE — NUR
SITTING UP IN WC TALKING ON PHONE IN HER ROOM. DENIES NEEDS OR C/O. ROLLS
HERSELF AROUND IN ROOM. BALANCE AND ENDURANCE ARE POOR WHEN STANDING. NO
DRESSING TO LEFT BIG TOE. NO S/S WORSENING SKIN BREAKDOWN TO LEFT BIG TOE BUT
IT STILL HAS A CIRCULAR SCAB ON END OF TOE. HER TOE IS PERMANTELY BENT SO END
OF TOE STRIKES FLOOR.

## 2019-12-03 NOTE — RHP
PATIENT: MIREYA BENOIT                              MEDICAL RECORD: [id number]
ACCOUNT: [id number]                                    LOCATION:[facility]
: [birth year]                                            ADMISSION DATE: 19
                                                         
 
                     REHABILITATION HISTORY AND PHYSICAL EXAMINATION
                         POST ADMISSION PHYSICIAN EXAMINATION
 
 
ADMITTING DIAGNOSES:  Right inguinal abscess and developing gangrene.
 
HISTORY OF PRESENT ILLNESS:  The patient is a 59-year-old female patient with
poorly controlled diabetes and has had some noncompliance with treatment, who
came in, had a status post incision and drainage of a labial abscess on
2019.  The wound culture was positive for MRSA.  She then developed a
neurological assistance and inability to move or feel her legs.  She was
transferred to [facility] for neurology consult on 2019.  EMS performed an MRI
of the spine and pelvis, found an area of osteomyelitis in the pelvis that
seemed to communicate with the open abscess.  The impression was that her
neurological symptoms were due to infection.  She was transferred back to
[place] for continued IV therapy.  The patient continues to have weakness
and tingling and numbness in all extremities; however, some better since IV
antibiotics.  Dr. Chapman was consulted.  He did do an MRI of her C-spine.  She is
able to stand with assistance, but has weakness, unable to dress herself or feed
herself independently.  MRI of her C-spine showed bulging discs and central
protrusion of C5-C6 and cord effacement.  She was taken to the OR for an ACDF at
C5-C7 on 2019.  On postop day, she had improvement in motor and sensory
function in all 4 extremities.  Previously, the weakness in her lower
extremities were affecting her tolerance to PT.  She is very fatigued, has
limited flexion and extension of her lower extremities.  Proximal muscle
strength is decreased and mod-to-max assist for ADLs, mod-to-max assist for
sit-to-stand and bed-to-chair.  She is highly motivated and has good family
support to regain her strength and actually return home.
 
COMORBIDITIES:  Include a Jose Antonio gangrene with infection extending to the bone
and osteomyelitis, labial abscess, osteomyelitis, diabetic foot ulcers, chronic
renal insufficiency, morbid obesity, diabetes, atherosclerosis, disc herniation
status post ACDF, coronary artery disease, hypertension, candidal skin
infection, urinary tract infections, hyperglycemia, elevated renal functions,
cervical myopathy and fatigue and weakness.
 
PAST MEDICAL HISTORY:  Significant for diabetes, got a history of noncompliance,
got a history of acid reflux, arthritis, depression and anxiety.
 
PAST SURGICAL HISTORY:  Includes gallbladder surgery, hysterectomy.  She has had
stents.  She has had knee replacement, carpal tunnel, trigger finger release,
anterior cervical fusion, exploratory surgeries, DC and tubal.
 
ALLERGIES:  CODEINE.
 
CURRENT MEDICATIONS:  Include insulin 60 units daily of the Lantus, she is on
Coreg 3.125 mg b.i.d. with meals, Zyprexa 5 mg at bedtime, hydrochlorothiazide
12.5 mg at bedtime, lisinopril 10 mg daily, she is on Onglyza 5 mg at bedtime,
Enulose 30 cc b.i.d., she is on Humalog low-resistant sliding scale, Neurontin
600 mg t.i.d., Klonopin 1 mg b.i.d., citalopram 40 mg at bedtime, atorvastatin
20 mg at bedtime, aspirin 81 mg daily, allopurinol 300 mg at bedtime, is on a
glucose replacement protocol, nitroglycerin 0.4 mg every 5 minutes p.r.n., Norco
10/325 one tab every 6 hours p.r.n. and Benadryl 25 mg every 4 hours p.r.n.
 
 
 
HISTORY AND PHYSICAL                           [id number]    MIREYA BENOIT      
 
 
 
HABITS:  No current alcohol or tobacco use.
 
FAMILY HISTORY:  Noncontributory.
 
SOCIAL HISTORY:  The patient hopes to return back home and get back to her prior
level of functioning.
 
REVIEW OF SYSTEMS:
GENERAL:  Does complain of weakness and fatigue, but has actually improved since
recent surgery.
HEENT:  Denies cold, cough, or congestion.
CARDIOVASCULAR:  Denies any chest pain.
LUNGS:  Does not complain of any shortness of breath.
 
PHYSICAL EXAMINATION:
VITAL SIGNS:  Stable.  She is afebrile.
GENERAL:  A morbidly obese female in no acute distress, alert upon exam.
HEENT:  Normocephalic and atraumatic.  Mucosa moist.
NECK:  Supple.  No lymphadenopathy.
LUNGS:  Clear at this time.  No wheezing, rhonchi or rales.
HEART:  Regular rate and rhythm.  No murmurs, rubs or gallops.
ABDOMEN:  Soft, obese.
EXTREMITIES:  No clubbing, cyanosis or edema.
NEUROLOGIC:  She does have noted weakness, it is diffuse, but does have 3/5
strength in her upper extremities and 2/5 in her lower.
 
LABORATORY DATA:  White count is 7.4, H&H of 9.4 and 29.2 and platelet count is
248.  Sodium is 136, potassium 4.9, BUN and creatinine of 35 and 1.2, and blood
sugar is noted to be 280.
 
ASSESSMENT:  This 59-year-old female patient admitted to rehab with a working
diagnosis of a labial abscess that turned into a transverse myelitis secondary
to osteomyelitis.  The patient has potential to make improvement.  We instituted
the following multidisciplinary therapies including, but not limited to
physical, occupational, respiratory, speech, nutritional services, prosthetics
and orthotics.  Given her complex medical condition and risk for more
complications, rehabilitation services cannot be provided at a low level of care
such as skilled nurse facility.
 
PLAN:
1.  Admit to [facility] for intensive inpatient therapy to include the
following disciplines:
A.  Physical therapy to improve gait, all transfer skills and bed mobility to a
modified independent level.
B.  Occupational therapy to a modified independent level.
C.  Case management to assist with discharge planning and placement options.
D.  Nutrition to assist with nutritional needs.
E.  Rehabilitation nursing to assist in monitoring the patient's underlying
medical condition and to assist with any type of bowel or bladder management.
2.  We will go ahead and watch her blood sugars closely.  Treat appropriately. 
Keep on current medications and I will see again in the a.m. on Monday.
 
TRANSINT:[id number] Voice Confirmation ID: [id number] DOCUMENT ID: [id number]
 
 
 
 
HISTORY AND PHYSICAL                           [id number]    MIREYA BENOIT notes whether there has been none or any medical/functional
change since admission:
- No change since preadmission screen.                                  
 
 
BRAYDEN attests patient continues to be appropriate for IRF:
- Continues to be appropriate.                                          
 
 
                                           
                                           JOAQUINA EL MD           
 
 
 
Electronically Signed by JOAQUINA EL on 19 at 0958
 
 
 
 
 
 
 
 
 
 
 
 
 
 
 
 
 
 
 
 
 
 
 
 
 
 
 
 
 
 
 
 
CC:                                                             [id number]
DICTATION DATE: 19     :     19      ADM IN  
                                                                              
[id number] [place] [address]

## 2019-12-03 NOTE — NUR
SITTING UP IN  FOR LUNCH. DOES OWN TASKS FOR HERSELF (OPENING LIDS, CLOSING
DRAWERS AND WASHING HANDS...ETC). DENIES INCREASED PAIN.

## 2019-12-03 NOTE — NUR
PATIENT FSBS 89. JOVANNYBERT GIVEN PER PATIENT REQUEST. TOILETED & HAD VOID. CALL
LIGHT WITHIN REACH. WILL CONTINUE TO MONITOR.

## 2019-12-03 NOTE — NUR
PATIENT RECEIVED SITTING UP IN WHEELCHAIR. FRIEND IN ROOM. VITAL SIGNS &
ASSESSMENT DONE. BED LOW. CALL LIGHT WITHIN REACH. WILL CONTINUE TO MONITOR.

## 2019-12-04 VITALS — SYSTOLIC BLOOD PRESSURE: 156 MMHG | DIASTOLIC BLOOD PRESSURE: 59 MMHG

## 2019-12-04 VITALS — SYSTOLIC BLOOD PRESSURE: 136 MMHG | DIASTOLIC BLOOD PRESSURE: 50 MMHG

## 2019-12-04 LAB
ANION GAP SERPL CALC-SCNC: 13.4 MMOL/L (ref 8–16)
BUN SERPL-MCNC: 35 MG/DL (ref 7–18)
CALCIUM SERPL-MCNC: 8.9 MG/DL (ref 8.5–10.1)
CHLORIDE SERPL-SCNC: 105 MMOL/L (ref 98–107)
CO2 SERPL-SCNC: 26.6 MMOL/L (ref 21–32)
CREAT SERPL-MCNC: 1.3 MG/DL (ref 0.6–1.3)
ERYTHROCYTE [DISTWIDTH] IN BLOOD BY AUTOMATED COUNT: 14.5 % (ref 11.5–14.5)
GLUCOSE SERPL-MCNC: 154 MG/DL (ref 74–106)
HCT VFR BLD CALC: 29.7 % (ref 36–48)
HGB BLD-MCNC: 9.9 G/DL (ref 12–16)
LYMPHOCYTES NFR BLD AUTO: 37 % (ref 15–50)
MCH RBC QN AUTO: 30.6 PG (ref 26–34)
MCHC RBC AUTO-ENTMCNC: 33.3 G/DL (ref 31–37)
MCV RBC: 91.7 FL (ref 80–100)
NEUTROPHILS NFR BLD AUTO: 49.9 % (ref 40–80)
OSMOLALITY SERPL CALC.SUM OF ELEC: 288 MOSM/KG (ref 275–300)
PLATELET # BLD: 293 10X3/UL (ref 130–400)
PMV BLD AUTO: 8.2 FL (ref 7.4–10.4)
POTASSIUM SERPL-SCNC: 6 MMOL/L (ref 3.5–5.1)
RBC # BLD AUTO: 3.24 10X6/UL (ref 4–5.4)
SODIUM SERPL-SCNC: 139 MMOL/L (ref 136–145)
WBC # BLD AUTO: 4.7 10X3/UL (ref 4.8–10.8)

## 2019-12-04 NOTE — NUR
PATIENT EYES CLOSED. RESPIRATIONS 18 & EVEN. BED LOW. ALARM ON. CALL LIGHT
WITHIN REACH. WILL CONTINUE TO MONITOR.

## 2019-12-04 NOTE — NUR
CONTACTED LAB FOR THE SECOND TIME TO COME DRAW BLOOD GLUCOSE. LAB RESPONDED
THAT THEY WOULD BE THERE SHORTLY BUT THAT THEY WHERE SHORT HANDED AT THIS
TIME.

## 2019-12-04 NOTE — NUR
GREETED PATIENTAND INTRODUCED MYSELF AS HER NURSE. PATIENT IS SITTIN IN
WHEELCHAIR WATCHING TV. RESPIRATIONS EVEN. NO S/S OF DISTRESS. STATES THAT
PAIN IS 6/10 IN BILATERAL SHOULDERS AND LOWER BACK. DENIES ANY FURTHER NEEDS
AT THIS TIME. CALL LIGHT IN REACH.

## 2019-12-04 NOTE — NUR
KARINA GROSSMAN RETURNED MY PAGE. HE INSTRUCTED THIS NURSE TO ADMINISTER 14
UNITS OF HUMALOG  UNITS OF LANTUS AND RECHECK BS AT 0100 AT TREAT
ACCORDING TO SLIDING SCALE.

## 2019-12-05 VITALS — DIASTOLIC BLOOD PRESSURE: 46 MMHG | SYSTOLIC BLOOD PRESSURE: 129 MMHG

## 2019-12-05 NOTE — NUR
PATIENT IS ALERT/ORIENT. SITTING UP AT BEDSIDE TO EAT BREAKFAST. CALL LIGHT
WITHIN REACH. VOICES NO NEEDS AT THIS TIME. PLAN IS TO DISCHARGE PATIENT HOME
TODAY

## 2019-12-05 NOTE — NUR
DISCHARGE INSTRUCTIONS GONE OVER WITH PATIENT. DISCHARGE MEDICATIONS CALLED
[facility]TO [facility]. FAMILY TOOK PATIENT HOME.

## 2019-12-05 NOTE — NUR
PATIENT IS ALERT/ORIENT. CALL LIGHT WITHIN REACH. VOICES NO NEEDS AT THIS
TIME. PLAN IS TO DISCHARGE TO THE [facility] AND REHAB TODAY

## 2019-12-05 NOTE — NUR
PATIENT DISCHARGING HOME TODAY WITH FAMILY. [facility] WILL PROVIDE
THERAPY AT HOME.NO NEW DME NEEDED AT THIS TIME. DR. BARFIELD 12/11/19 @
10:30, DR. CHEUNG 12/11/19 @ 1:00. PATIENT CHOICE FORM WITH COMPARE DATA
GIVEN AND REFVIEWED WITH PATIENT AND PATIENT VOICED UNDERSTANDING, [facility] FORM
SIGNED, COPY GIVEN TO PATIENT AND FILED IN CHART. DISCHARGE INSTRUCTIONS FAXED
TO PCP, HOME HEALTH TO PATIENT INSURANCE, [phone], AUTH. #
[id number], AND REVIEWED WITH PATIENT.

## 2019-12-06 NOTE — OP
PATIENT NAME:  MIREYA BENOIT                        MEDICAL RECORD: [id number]
:64                                             LOCATION:[id number]
                                                         ADMISSION DATE:19
SURGEON:  JOAQUINA GAMBOA MD                 
 
 
DATE OF OPERATION:  2019
 
SURGEON:  Joaquina Gamboa MD
 
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
compression.
 
PROCEDURE:  Anterior cervical discectomy and fusion with Zavation anterior
cervical plate and screws, Shauna bone allograft, separate PEEK interbody cage.
 
DESCRIPTION OF TECHNIQUE:  After induction of general endotracheal anesthesia,
the patient was positioned supine on the operating table.  Neck was prepped and
draped in usual sterile fashion.  Fluoroscopic x-ray and Stinnett dissector
localized the C6-C7 interspace.  After infiltration of 1:100,000 epinephrine and
1% lidocaine, a transverse skin incision was carried out from the midline to the
sternocleidomastoid muscle.  The platysma was divided with #15-blade.  Using
blunt and sharp dissection with Metzenbaum scissors, I proceeded in the
avascular plane medial to the carotid sheath.  The C6-C7 interspace was now
identified with fluoroscopic x-ray and a spinal needle.  The longus colli
muscles were elevated from bodies of C6 and C7.  Osteophytes were removed
anteriorly with Adson rongeurs.  The self-retaining retractor was placed deep to
the longus colli muscles.  Mize distracting pins placed in body of C6 and C7. 
Disc space was incised with #11 blade, and pituitary rongeurs and curettes were
used to clear the disc space of disc material.  The bony endplates were prepared
with curettes.  Posteriorly osteophytes were drilled away under microscopic
illumination with a Midas-Lester drill.  The posterior longitudinal ligament was
removed with Cloward rongeurs.  A large fragment of disc material was removed
from the chinedu hole in the posterior longitudinal ligament.  This decompressed
the dura well.  The foraminotomies were carried out bilaterally with Cloward
rongeurs.  Following this, the nerve roots were decompressed as well as the
central dura.  A PEEK interbody cage 7 mm in height was filled with Shauna bone
allograft and placed in the disc space under distraction.  The Mize pins
removed and a 16-mm plate was used to span the C6-C7 interspace.  The 16-mm
screws were placed in the holes in the plate.  The locking cams were tightened
down over the screw heads.  Good position of hardware was confirmed with
fluoroscopic x-ray.  Meticulous hemostasis was maintained throughout the wound. 
Wound was irrigated with copious amounts of Ancef irrigant solution.  The
platysma and subdermal layer were closed with interrupted 3-0 Vicryl suture. 
The skin was reapproximated with Steri-Strips and benzoin.  A sterile dressing
was applied to the wound.  The patient was awakened in good condition and taken
to recovery.  All counts were reported as correct.  Estimated blood loss was
minimal.
 
TRANSINT:[id number] Voice Confirmation ID: [id number] DOCUMENT ID: [id number]
 
 
 
OPERATIVE REPORT                               [id number]    MIREYA BENOIT JOHN MD                 
 
 
 
Electronically Signed by JOAQUINA GAMBOA on 19 at 0947
 
 
 
 
 
 
 
 
 
 
 
 
 
 
 
 
 
 
 
 
 
 
 
 
 
 
 
 
 
 
 
 
 
 
 
 
 
 
 
 
 
CC:                                                             [id number]
DICTATION DATE: 19     :     19 0651      DIS IN  
                                                                      19
[address] [place] [address]

## 2019-12-16 LAB — HBA1C MFR BLD HPLC: 11.5 %

## 2020-03-19 LAB — HBA1C MFR BLD HPLC: 12.7 %

## 2020-05-04 ENCOUNTER — HOSPITAL ENCOUNTER (INPATIENT)
Dept: HOSPITAL 84 - D.MS | Age: 60
LOS: 8 days | Discharge: HOME HEALTH | DRG: 600 | End: 2020-05-12
Attending: FAMILY MEDICINE | Admitting: FAMILY MEDICINE
Payer: COMMERCIAL

## 2020-05-04 VITALS
WEIGHT: 293 LBS | BODY MASS INDEX: 44.41 KG/M2 | WEIGHT: 293 LBS | HEIGHT: 68 IN | BODY MASS INDEX: 44.41 KG/M2 | BODY MASS INDEX: 44.41 KG/M2 | HEIGHT: 68 IN | BODY MASS INDEX: 44.41 KG/M2

## 2020-05-04 VITALS — DIASTOLIC BLOOD PRESSURE: 61 MMHG | SYSTOLIC BLOOD PRESSURE: 109 MMHG

## 2020-05-04 VITALS — SYSTOLIC BLOOD PRESSURE: 118 MMHG | DIASTOLIC BLOOD PRESSURE: 53 MMHG

## 2020-05-04 DIAGNOSIS — I12.9: ICD-10-CM

## 2020-05-04 DIAGNOSIS — E11.22: ICD-10-CM

## 2020-05-04 DIAGNOSIS — E86.9: ICD-10-CM

## 2020-05-04 DIAGNOSIS — I25.10: ICD-10-CM

## 2020-05-04 DIAGNOSIS — E87.1: ICD-10-CM

## 2020-05-04 DIAGNOSIS — N18.3: ICD-10-CM

## 2020-05-04 DIAGNOSIS — N17.0: ICD-10-CM

## 2020-05-04 DIAGNOSIS — N61.1: Primary | ICD-10-CM

## 2020-05-04 DIAGNOSIS — E66.01: ICD-10-CM

## 2020-05-04 LAB
ANION GAP SERPL CALC-SCNC: 14.9 MMOL/L (ref 8–16)
BASOPHILS NFR BLD AUTO: 0.1 % (ref 0–2)
BUN SERPL-MCNC: 47 MG/DL (ref 7–18)
CALCIUM SERPL-MCNC: 7.1 MG/DL (ref 8.5–10.1)
CHLORIDE SERPL-SCNC: 93 MMOL/L (ref 98–107)
CO2 SERPL-SCNC: 23.6 MMOL/L (ref 21–32)
CREAT SERPL-MCNC: 2.5 MG/DL (ref 0.6–1.3)
EOSINOPHIL NFR BLD: 0.5 % (ref 0–7)
ERYTHROCYTE [DISTWIDTH] IN BLOOD BY AUTOMATED COUNT: 13.1 % (ref 11.5–14.5)
GLUCOSE SERPL-MCNC: 370 MG/DL (ref 74–106)
HCT VFR BLD CALC: 28 % (ref 36–48)
HGB BLD-MCNC: 8.9 G/DL (ref 12–16)
IMM GRANULOCYTES NFR BLD: 1.2 % (ref 0–5)
LYMPHOCYTES NFR BLD AUTO: 10.7 % (ref 15–50)
MCH RBC QN AUTO: 29.3 PG (ref 26–34)
MCHC RBC AUTO-ENTMCNC: 31.8 G/DL (ref 31–37)
MCV RBC: 92.1 FL (ref 80–100)
MONOCYTES NFR BLD: 9.4 % (ref 2–11)
NEUTROPHILS NFR BLD AUTO: 78.1 % (ref 40–80)
OSMOLALITY SERPL CALC.SUM OF ELEC: 281 MOSM/KG (ref 275–300)
PLATELET # BLD: 276 10X3/UL (ref 130–400)
PMV BLD AUTO: 9 FL (ref 7.4–10.4)
POTASSIUM SERPL-SCNC: 4.5 MMOL/L (ref 3.5–5.1)
RBC # BLD AUTO: 3.04 10X6/UL (ref 4–5.4)
SODIUM SERPL-SCNC: 127 MMOL/L (ref 136–145)
WBC # BLD AUTO: 18.1 10X3/UL (ref 4.8–10.8)

## 2020-05-05 VITALS — DIASTOLIC BLOOD PRESSURE: 60 MMHG | SYSTOLIC BLOOD PRESSURE: 124 MMHG

## 2020-05-05 VITALS — SYSTOLIC BLOOD PRESSURE: 102 MMHG | DIASTOLIC BLOOD PRESSURE: 51 MMHG

## 2020-05-05 VITALS — SYSTOLIC BLOOD PRESSURE: 105 MMHG | DIASTOLIC BLOOD PRESSURE: 63 MMHG

## 2020-05-05 VITALS — DIASTOLIC BLOOD PRESSURE: 51 MMHG | SYSTOLIC BLOOD PRESSURE: 100 MMHG

## 2020-05-05 VITALS — DIASTOLIC BLOOD PRESSURE: 54 MMHG | SYSTOLIC BLOOD PRESSURE: 133 MMHG

## 2020-05-05 VITALS — SYSTOLIC BLOOD PRESSURE: 151 MMHG | DIASTOLIC BLOOD PRESSURE: 61 MMHG

## 2020-05-05 LAB
ALBUMIN SERPL-MCNC: 1.8 G/DL (ref 3.4–5)
ALP SERPL-CCNC: 146 U/L (ref 30–120)
ALT SERPL-CCNC: 16 U/L (ref 10–68)
ANION GAP SERPL CALC-SCNC: 12.7 MMOL/L (ref 8–16)
APTT BLD: 37.7 SECONDS (ref 22.8–39.4)
BILIRUB SERPL-MCNC: 0.34 MG/DL (ref 0.2–1.3)
BUN SERPL-MCNC: 50 MG/DL (ref 7–18)
CALCIUM SERPL-MCNC: 7.8 MG/DL (ref 8.5–10.1)
CHLORIDE SERPL-SCNC: 95 MMOL/L (ref 98–107)
CO2 SERPL-SCNC: 25.2 MMOL/L (ref 21–32)
CREAT SERPL-MCNC: 2.2 MG/DL (ref 0.6–1.3)
GLOBULIN SER-MCNC: 4.5 G/L
GLUCOSE SERPL-MCNC: 233 MG/DL (ref 74–106)
INR PPP: 1.13 (ref 0.85–1.17)
OSMOLALITY SERPL CALC.SUM OF ELEC: 278 MOSM/KG (ref 275–300)
POTASSIUM SERPL-SCNC: 3.9 MMOL/L (ref 3.5–5.1)
PROT SERPL-MCNC: 6.3 G/DL (ref 6.4–8.2)
PROTHROMBIN TIME: 14.4 SECONDS (ref 11.6–15)
SODIUM SERPL-SCNC: 129 MMOL/L (ref 136–145)

## 2020-05-05 PROCEDURE — 0H95XZZ DRAINAGE OF CHEST SKIN, EXTERNAL APPROACH: ICD-10-PCS | Performed by: SURGERY

## 2020-05-05 NOTE — NUR
PT LYING IN BED SLEEPING WITHOUT DISTRESS, VSS. IV RIGHT FA INFUSING NS @ 200.
O2 1L/NC. LEFT BREAST DRESSING CDI. SPOKE WITH DAUGHTER OVER PHONE, UPDATE
GIVEN. WILL CTM

## 2020-05-06 VITALS — DIASTOLIC BLOOD PRESSURE: 46 MMHG | SYSTOLIC BLOOD PRESSURE: 123 MMHG

## 2020-05-06 VITALS — DIASTOLIC BLOOD PRESSURE: 51 MMHG | SYSTOLIC BLOOD PRESSURE: 111 MMHG

## 2020-05-06 VITALS — DIASTOLIC BLOOD PRESSURE: 67 MMHG | SYSTOLIC BLOOD PRESSURE: 132 MMHG

## 2020-05-06 VITALS — DIASTOLIC BLOOD PRESSURE: 67 MMHG | SYSTOLIC BLOOD PRESSURE: 117 MMHG

## 2020-05-06 LAB
ANION GAP SERPL CALC-SCNC: 11.6 MMOL/L (ref 8–16)
BASOPHILS NFR BLD AUTO: 0.1 % (ref 0–2)
BUN SERPL-MCNC: 59 MG/DL (ref 7–18)
CALCIUM SERPL-MCNC: 7.7 MG/DL (ref 8.5–10.1)
CHLORIDE SERPL-SCNC: 99 MMOL/L (ref 98–107)
CO2 SERPL-SCNC: 25.6 MMOL/L (ref 21–32)
CREAT SERPL-MCNC: 3.1 MG/DL (ref 0.6–1.3)
EOSINOPHIL NFR BLD: 3.4 % (ref 0–7)
ERYTHROCYTE [DISTWIDTH] IN BLOOD BY AUTOMATED COUNT: 13.4 % (ref 11.5–14.5)
GLUCOSE SERPL-MCNC: 202 MG/DL (ref 74–106)
HCT VFR BLD CALC: 26.7 % (ref 36–48)
HGB BLD-MCNC: 8.2 G/DL (ref 12–16)
IMM GRANULOCYTES NFR BLD: 1 % (ref 0–5)
LYMPHOCYTES NFR BLD AUTO: 10.5 % (ref 15–50)
MCH RBC QN AUTO: 28.6 PG (ref 26–34)
MCHC RBC AUTO-ENTMCNC: 30.7 G/DL (ref 31–37)
MCV RBC: 93 FL (ref 80–100)
MONOCYTES NFR BLD: 8.2 % (ref 2–11)
NEUTROPHILS NFR BLD AUTO: 76.8 % (ref 40–80)
OSMOLALITY SERPL CALC.SUM OF ELEC: 287 MOSM/KG (ref 275–300)
PLATELET # BLD: 278 10X3/UL (ref 130–400)
PMV BLD AUTO: 8.9 FL (ref 7.4–10.4)
POTASSIUM SERPL-SCNC: 4.2 MMOL/L (ref 3.5–5.1)
RBC # BLD AUTO: 2.87 10X6/UL (ref 4–5.4)
SODIUM SERPL-SCNC: 132 MMOL/L (ref 136–145)
WBC # BLD AUTO: 13.9 10X3/UL (ref 4.8–10.8)

## 2020-05-06 NOTE — OP
PATIENT NAME:  MIREYA BENOIT                        MEDICAL RECORD: [id number]
:64                                             LOCATION:[id number]
                                                         ADMISSION DATE:20
SURGEON:  MÓNICA BOURNE MD             
 
 
DATE OF OPERATION:  2020
 
SURGEON:  Mónica Bourne MD
 
PREOPERATIVE DIAGNOSIS:  Left breast abscess with cellulitis.
 
POSTOPERATIVE DIAGNOSIS:  Left breast abscess with cellulitis.
 
PROCEDURE PERFORMED:  Incision and drainage of complex multiloculated left
breast abscess 10 x 6 x 5 cm.
 
ANESTHESIA:  General.
 
COMPLICATIONS:  None.
 
SPECIMENS:
1.  Skin biopsy, left breast.
2.  Anaerobic and aerobic Gram stains.
 
Case was grossly contaminated.
 
OPERATIVE COURSE:  Consent was obtained, the patient was taken to the operating
room and placed in supine position on the operating table.  Next, general
anesthesia was given via endotracheal intubation after a timeout was performed
that confirmed the correct patient and procedure.  The left breast was prepped
and draped in typical sterile fashion.  An elliptical skin incision was made
with a 15-blade scalpel over the area of greatest fluctuance.  Once the skin was
excised, it was sent for permanent pathology to rule out malignancy. 
Immediately thereafter approximately 150 cc of purulent brown fluid were
expressed from the abscess cavity.  Culture swabs were obtained for Gram stain,
anaerobic, and aerobic culture with sensitivity.  The wound was copiously
irrigated with saline.  The multiloculated abscess cavity was broken with blunt
finger dissection.  Once the wound was copiously irrigated, cleaned, it was
packed with Kerlix soaked in peroxide and Betadine.  The wound was then covered
with sterile gauze dressings and tape.  At the end of procedure, all needle and
instrument counts were correct.  No complications occurred.  The patient was
extubated and transferred to the PACU in stable condition.
 
TRANSINT:[id number] Voice Confirmation ID: [id number] DOCUMENT ID: [id number]
                                           
                                           MÓNICA BOURNE MD             
 
 
 
Electronically Signed by MÓNICA BOURNE on 20 at 0843
CC:                                                             [id number]
DICTATION DATE: 20 142     :     20      ADM IN  
                                                                              
[id number] [place] [address]

## 2020-05-06 NOTE — NUR
PT SITTING UP IN BED WITHOUT DISTRESS, AOX4. LEFT BREAST DRESSING CDI. LEFT
UPPER ARM IV INFUSING NS @ 200. DENIES PAIN OR NEEDS AT THIS TIME. CL IN
REACH, WILL CTM

## 2020-05-06 NOTE — NUR
FSBS 165, COVERAGE PER SS. SEE MAR. ASSISTED PT WITH CANE TO BATHROOM. PT
UNABLE TO VOID. PT STATES IT HAS BEEN ALMOST 24 HOURS SINCE VOIDING. ABD NON
TENDER, DOES NOT FEEL URGE BUT SHE WORRIES IT HAS BEEN TOO LONG WITH GETTING
SO MANY FLUIDS AND DRINKING SO MUCH WATER. UNABLE TO GET BLADDER SCANNER. PT
IN AND OUT CATH D/T ALMOST BEING 24 HOURS SINCE VOID. 400ML OUT. PT STATES SHE
FEELS "LIGHTER" AFTER. WILL CTM

## 2020-05-06 NOTE — NUR
PT C/O PAIN IN RT ARM. PATIENT IV INFILTRATED AND ARM IS SWOLLEN, REMOVED IV
WITH CATHETER INTACT, ELEVATED PT ARM AND APPLIED WARM PACK TO SITE,
ADMINISTER PRN MEDICATIONS WITH SCHEDULED MEDICATIONS, CONTINUE WITH PLAN OF
CARE

## 2020-05-06 NOTE — NUR
PT LYING IN BED ASLEEP, CL N REACH, DRESSING ON LT BREAST INTACT, IV TO RT FA,
CDI, LUNG SOUNDS CTA, NO NEEDS VOICED AT THIS TIME, ASSUME PT CARE

## 2020-05-07 VITALS — DIASTOLIC BLOOD PRESSURE: 55 MMHG | SYSTOLIC BLOOD PRESSURE: 149 MMHG

## 2020-05-07 VITALS — SYSTOLIC BLOOD PRESSURE: 155 MMHG | DIASTOLIC BLOOD PRESSURE: 75 MMHG

## 2020-05-07 VITALS — SYSTOLIC BLOOD PRESSURE: 143 MMHG | DIASTOLIC BLOOD PRESSURE: 65 MMHG

## 2020-05-07 VITALS — SYSTOLIC BLOOD PRESSURE: 145 MMHG | DIASTOLIC BLOOD PRESSURE: 64 MMHG

## 2020-05-07 VITALS — DIASTOLIC BLOOD PRESSURE: 55 MMHG | SYSTOLIC BLOOD PRESSURE: 111 MMHG

## 2020-05-07 LAB
ANION GAP SERPL CALC-SCNC: 16.3 MMOL/L (ref 8–16)
BASOPHILS NFR BLD AUTO: 0.1 % (ref 0–2)
BUN SERPL-MCNC: 58 MG/DL (ref 7–18)
CALCIUM SERPL-MCNC: 7.2 MG/DL (ref 8.5–10.1)
CHLORIDE SERPL-SCNC: 101 MMOL/L (ref 98–107)
CO2 SERPL-SCNC: 21.1 MMOL/L (ref 21–32)
CREAT SERPL-MCNC: 3.9 MG/DL (ref 0.6–1.3)
EOSINOPHIL NFR BLD: 2.2 % (ref 0–7)
ERYTHROCYTE [DISTWIDTH] IN BLOOD BY AUTOMATED COUNT: 13.6 % (ref 11.5–14.5)
GLUCOSE SERPL-MCNC: 92 MG/DL (ref 74–106)
HCT VFR BLD CALC: 27.8 % (ref 36–48)
HGB BLD-MCNC: 8.6 G/DL (ref 12–16)
IMM GRANULOCYTES NFR BLD: 0.8 % (ref 0–5)
LYMPHOCYTES NFR BLD AUTO: 12.8 % (ref 15–50)
MCH RBC QN AUTO: 29 PG (ref 26–34)
MCHC RBC AUTO-ENTMCNC: 30.9 G/DL (ref 31–37)
MCV RBC: 93.6 FL (ref 80–100)
MONOCYTES NFR BLD: 7.2 % (ref 2–11)
NEUTROPHILS NFR BLD AUTO: 76.9 % (ref 40–80)
OSMOLALITY SERPL CALC.SUM OF ELEC: 283 MOSM/KG (ref 275–300)
PLATELET # BLD: 342 10X3/UL (ref 130–400)
PMV BLD AUTO: 9.1 FL (ref 7.4–10.4)
POTASSIUM SERPL-SCNC: 4.4 MMOL/L (ref 3.5–5.1)
RBC # BLD AUTO: 2.97 10X6/UL (ref 4–5.4)
SODIUM SERPL-SCNC: 134 MMOL/L (ref 136–145)
VANCOMYCIN SERPL-MCNC: 28.7 UG/ML (ref 10–20)
WBC # BLD AUTO: 10 10X3/UL (ref 4.8–10.8)

## 2020-05-07 NOTE — NUR
BLADDER SCANNED PT AFTER SHE VOIDED, PT STATED SHE NEEDED OR FELT THE NEED TO
URINATE AND DID NOT FEEL AS IF SHE EMPTIED HER BLADDER, SCAN SHOWED 0, ADVISED
PT I WILL DO ANOTHER SCAN IN ABOUT 3 HOURS TO SEE WHERE WE ARE AT. CL IN
REACH, CONTINUE WITH PLAN OF CARE

## 2020-05-07 NOTE — NUR
PT HAS SORE ON RT GREAT TOE THAT IS OPEN AND STARTED TO BLEED, CLEANED WOUND
AND APPLIED DRESSING. CONTINUE WITH PLAN OF CARE

## 2020-05-07 NOTE — NUR
PLACED PT DEVINE AS ORDERED AND RECEIVED OUTPUT , PT STATED SHE FELT MUCH
BETTER, NO OTHER NEEDS VOICED AT THIS TIME, CONTINUE WITH PLAN OF CARE

## 2020-05-08 VITALS — DIASTOLIC BLOOD PRESSURE: 67 MMHG | SYSTOLIC BLOOD PRESSURE: 144 MMHG

## 2020-05-08 VITALS — SYSTOLIC BLOOD PRESSURE: 130 MMHG | DIASTOLIC BLOOD PRESSURE: 65 MMHG

## 2020-05-08 VITALS — SYSTOLIC BLOOD PRESSURE: 136 MMHG | DIASTOLIC BLOOD PRESSURE: 76 MMHG

## 2020-05-08 VITALS — SYSTOLIC BLOOD PRESSURE: 155 MMHG | DIASTOLIC BLOOD PRESSURE: 76 MMHG

## 2020-05-08 VITALS — SYSTOLIC BLOOD PRESSURE: 145 MMHG | DIASTOLIC BLOOD PRESSURE: 60 MMHG

## 2020-05-08 VITALS — SYSTOLIC BLOOD PRESSURE: 165 MMHG | DIASTOLIC BLOOD PRESSURE: 76 MMHG

## 2020-05-08 LAB
ANION GAP SERPL CALC-SCNC: 19.5 MMOL/L (ref 8–16)
BACTERIA #/AREA URNS HPF: (no result) /HPF
BASOPHILS NFR BLD AUTO: 0 % (ref 0–2)
BILIRUB SERPL-MCNC: NEGATIVE MG/DL
BUN SERPL-MCNC: 60 MG/DL (ref 7–18)
CALCIUM SERPL-MCNC: 6.7 MG/DL (ref 8.5–10.1)
CHLORIDE SERPL-SCNC: 101 MMOL/L (ref 98–107)
CO2 SERPL-SCNC: 20 MMOL/L (ref 21–32)
CREAT SERPL-MCNC: 3.9 MG/DL (ref 0.6–1.3)
CREATININE - URINE: 57.3 MG/DL (ref 30–125)
EOSINOPHIL NFR BLD: 3.4 % (ref 0–7)
ERYTHROCYTE [DISTWIDTH] IN BLOOD BY AUTOMATED COUNT: 13.6 % (ref 11.5–14.5)
GLUCOSE SERPL-MCNC: 137 MG/DL (ref 74–106)
GLUCOSE SERPL-MCNC: NEGATIVE MG/DL
HCT VFR BLD CALC: 26.1 % (ref 36–48)
HGB BLD-MCNC: 8.2 G/DL (ref 12–16)
IMM GRANULOCYTES NFR BLD: 1 % (ref 0–5)
KETONES UR STRIP-MCNC: NEGATIVE MG/DL
LYMPHOCYTES NFR BLD AUTO: 11.2 % (ref 15–50)
MCH RBC QN AUTO: 29.1 PG (ref 26–34)
MCHC RBC AUTO-ENTMCNC: 31.4 G/DL (ref 31–37)
MCV RBC: 92.6 FL (ref 80–100)
MONOCYTES NFR BLD: 6.6 % (ref 2–11)
NEUTROPHILS NFR BLD AUTO: 77.8 % (ref 40–80)
NITRITE UR-MCNC: NEGATIVE MG/ML
OSMOLALITY SERPL CALC.SUM OF ELEC: 290 MOSM/KG (ref 275–300)
PH UR STRIP: 5 [PH] (ref 5–6)
PLATELET # BLD: 357 10X3/UL (ref 130–400)
PMV BLD AUTO: 8.6 FL (ref 7.4–10.4)
POTASSIUM SERPL-SCNC: 4.5 MMOL/L (ref 3.5–5.1)
PROT UR-MCNC: 35.2 MG/DL (ref 0–11.9)
RBC # BLD AUTO: 2.82 10X6/UL (ref 4–5.4)
RBC #/AREA URNS HPF: (no result) /HPF (ref 0–5)
SODIUM SERPL-SCNC: 136 MMOL/L (ref 136–145)
SP GR UR STRIP: 1.01 (ref 1–1.02)
SQUAMOUS #/AREA URNS HPF: (no result) /HPF (ref 0–5)
UROBILINOGEN UR-MCNC: NORMAL MG/DL
VANCOMYCIN SERPL-MCNC: 19 UG/ML (ref 10–20)
WBC # BLD AUTO: 9.4 10X3/UL (ref 4.8–10.8)
WBC #/AREA URNS HPF: (no result) /HPF
YEAST #/AREA URNS HPF: (no result) /HPF

## 2020-05-08 NOTE — NUR
LAB CALLED PATIENT HAS CALCIUM 6.7 THIS AM. CALL FOR ON CALL WAS DR KILPATRICK.
DR KILPATRICK STATED DR BARFIELD WILL LOOK AT IT WHEN HE ROUNDS THIS AM.

## 2020-05-08 NOTE — NUR
PATIENT RESTING IN BED WITH EYES OPEN. NO S/S OF ACUTE DISTRESS. NO C/O AT
THIS TIME. PATIENT HAS IV IN LEFT UPPER ARM, NORMAL SALINE @ 200 ML/HR. IV IS
PATENT WITHOUT REDNESS, SWELLING, OR TENDERNESS. PATIENT HAS TELEMTRY: 58
SINUS ELIUD BBB. PATIENT IS POST-OP DAY 3 OF A LEFT BREAST I&D, DRESSING IS
C/D/I. PATIENT HAS DRESSING C/D/I TO RIGHT GREATER TOE. PATIENT HAS SCABS, AND
SORES BILAT LOWER LEGS. PATIENT CAN AMUBLATE WITH CANE AND PARTIAL ASSIST,
PATIENT IS VERY SHAKEY THOUGH. PATIENT HAS DEVINE. CALL LIGHT WITHIN REACH.
WILL CONTINUE TO MONITOR.

## 2020-05-08 NOTE — NUR
RESTING IN BED WITH EYES CLOSED. RESPIRATIONS EVEN AND UNLABORED. UP WITH
CANE. SCDS PRESENT. DRESSING TO LEFT BREAST, C/D/I. POD #3 I&D LEFT BREAST.
DEVINE CATHETER PRESENT. DRESSING TO RIGHT FOOT BIG TOE. IV TO LEFT UPPER ARM,
NS INFUSING @ 200ML/HR. SITE PATENT WITHOUT REDNESS OR SWELLING. DENIES ANY
NEEDS AT THIS TIME. CALL LIGHT IN REACH. WILL CONTINUE TO MONITOR.

## 2020-05-08 NOTE — NUR
Nutrition follow-up:
Diet: ADA consistent CHO
PO intake poor at this time
Labs reviewed
Will offer nutritional supplements and encourage increased po intake
RDN following.

## 2020-05-09 VITALS — DIASTOLIC BLOOD PRESSURE: 74 MMHG | SYSTOLIC BLOOD PRESSURE: 160 MMHG

## 2020-05-09 VITALS — DIASTOLIC BLOOD PRESSURE: 70 MMHG | SYSTOLIC BLOOD PRESSURE: 165 MMHG

## 2020-05-09 VITALS — SYSTOLIC BLOOD PRESSURE: 139 MMHG | DIASTOLIC BLOOD PRESSURE: 47 MMHG

## 2020-05-09 VITALS — DIASTOLIC BLOOD PRESSURE: 52 MMHG | SYSTOLIC BLOOD PRESSURE: 179 MMHG

## 2020-05-09 VITALS — DIASTOLIC BLOOD PRESSURE: 64 MMHG | SYSTOLIC BLOOD PRESSURE: 183 MMHG

## 2020-05-09 VITALS — DIASTOLIC BLOOD PRESSURE: 70 MMHG | SYSTOLIC BLOOD PRESSURE: 172 MMHG

## 2020-05-09 LAB
ANION GAP SERPL CALC-SCNC: 15.5 MMOL/L (ref 8–16)
BUN SERPL-MCNC: 55 MG/DL (ref 7–18)
CALCIUM SERPL-MCNC: 7.2 MG/DL (ref 8.5–10.1)
CHLORIDE SERPL-SCNC: 108 MMOL/L (ref 98–107)
CO2 SERPL-SCNC: 19.1 MMOL/L (ref 21–32)
CREAT SERPL-MCNC: 3.3 MG/DL (ref 0.6–1.3)
ERYTHROCYTE [DISTWIDTH] IN BLOOD BY AUTOMATED COUNT: 13.5 % (ref 11.5–14.5)
GLUCOSE SERPL-MCNC: 155 MG/DL (ref 74–106)
HCT VFR BLD CALC: 26.4 % (ref 36–48)
HGB BLD-MCNC: 8.6 G/DL (ref 12–16)
LYMPHOCYTES NFR BLD AUTO: 16.7 % (ref 15–50)
MCH RBC QN AUTO: 29.9 PG (ref 26–34)
MCHC RBC AUTO-ENTMCNC: 32.6 G/DL (ref 31–37)
MCV RBC: 91.7 FL (ref 80–100)
NEUTROPHILS NFR BLD AUTO: 73.3 % (ref 40–80)
OSMOLALITY SERPL CALC.SUM OF ELEC: 293 MOSM/KG (ref 275–300)
PLATELET # BLD: 361 10X3/UL (ref 130–400)
PMV BLD AUTO: 8.3 FL (ref 7.4–10.4)
POTASSIUM SERPL-SCNC: 4.6 MMOL/L (ref 3.5–5.1)
RBC # BLD AUTO: 2.88 10X6/UL (ref 4–5.4)
SODIUM SERPL-SCNC: 138 MMOL/L (ref 136–145)
VANCOMYCIN SERPL-MCNC: 13.7 UG/ML (ref 10–20)
WBC # BLD AUTO: 8.6 10X3/UL (ref 4.8–10.8)

## 2020-05-09 NOTE — NUR
ALERT AND ORIENTED, RESTING IN BED WITH EYES OPEN. NO C/O PAIN. NO S/S OF
ACUTE DISTRESS NOTED. UP WITH ASSIST. POD #4 I&D LEFT BREAST, DRESSING C/D/I.
IV TO LEFT UPPER ARM, NS INFUSING @ 200ML/HR. SITE PATENT WITHOUT REDNESS OR
SWELLING. ON TELEMETRY 56 SB. DEVINE CATHETER PRESENT. DENIES ANY NEEDS AT THIS
TIME. CALL LIGHT IN REACH. WILL CONTINUE TO MONITOR.

## 2020-05-10 VITALS — DIASTOLIC BLOOD PRESSURE: 59 MMHG | SYSTOLIC BLOOD PRESSURE: 174 MMHG

## 2020-05-10 VITALS — SYSTOLIC BLOOD PRESSURE: 137 MMHG | DIASTOLIC BLOOD PRESSURE: 62 MMHG

## 2020-05-10 VITALS — DIASTOLIC BLOOD PRESSURE: 60 MMHG | SYSTOLIC BLOOD PRESSURE: 170 MMHG

## 2020-05-10 VITALS — DIASTOLIC BLOOD PRESSURE: 64 MMHG | SYSTOLIC BLOOD PRESSURE: 187 MMHG

## 2020-05-10 VITALS — DIASTOLIC BLOOD PRESSURE: 60 MMHG | SYSTOLIC BLOOD PRESSURE: 177 MMHG

## 2020-05-10 VITALS — SYSTOLIC BLOOD PRESSURE: 180 MMHG | DIASTOLIC BLOOD PRESSURE: 63 MMHG

## 2020-05-10 LAB
ANION GAP SERPL CALC-SCNC: 16.1 MMOL/L (ref 8–16)
BUN SERPL-MCNC: 50 MG/DL (ref 7–18)
CALCIUM SERPL-MCNC: 7.7 MG/DL (ref 8.5–10.1)
CHLORIDE SERPL-SCNC: 109 MMOL/L (ref 98–107)
CO2 SERPL-SCNC: 20.3 MMOL/L (ref 21–32)
CREAT SERPL-MCNC: 2.8 MG/DL (ref 0.6–1.3)
ERYTHROCYTE [DISTWIDTH] IN BLOOD BY AUTOMATED COUNT: 13.3 % (ref 11.5–14.5)
GLUCOSE SERPL-MCNC: 137 MG/DL (ref 74–106)
HCT VFR BLD CALC: 27.8 % (ref 36–48)
HGB BLD-MCNC: 9 G/DL (ref 12–16)
LYMPHOCYTES NFR BLD AUTO: 18.5 % (ref 15–50)
MCH RBC QN AUTO: 29.5 PG (ref 26–34)
MCHC RBC AUTO-ENTMCNC: 32.4 G/DL (ref 31–37)
MCV RBC: 91.1 FL (ref 80–100)
NEUTROPHILS NFR BLD AUTO: 73.3 % (ref 40–80)
OSMOLALITY SERPL CALC.SUM OF ELEC: 295 MOSM/KG (ref 275–300)
PLATELET # BLD: 450 10X3/UL (ref 130–400)
PMV BLD AUTO: 8.2 FL (ref 7.4–10.4)
POTASSIUM SERPL-SCNC: 4.4 MMOL/L (ref 3.5–5.1)
RBC # BLD AUTO: 3.05 10X6/UL (ref 4–5.4)
SODIUM SERPL-SCNC: 141 MMOL/L (ref 136–145)
VANCOMYCIN SERPL-MCNC: 11.2 UG/ML (ref 10–20)
WBC # BLD AUTO: 9.9 10X3/UL (ref 4.8–10.8)

## 2020-05-10 NOTE — NUR
ALERT AND ORIENTED, RESTING IN BED WITH EYES OPEN. NO C/O PAIN. NO S/S OF
ACUTE DISTRESS NOTED. UP WITH ASSIST. POD #5 I&D TO LEFT BREAST. DEVINE
CATHETER. SCDS. IV TO LEFT UPPER ARM, NS INFUSING @ 75ML/HR. SITE PATENT
WITHOUT REDNESS OR SWELLING. DRESSING TO LEFT BREAST C/D/I. DENIES ANY NEEDS
AT THIS TIME. CALL LIGHT IN REACH. WILL CONTINUE TO MONITOR.

## 2020-05-10 NOTE — NUR
RESTING IN BED WITH EYES CLOSED. RESPIRATIONS EVEN AND UNLABORED. NO S/S OF
ACUTE DISTRESS NOTED. CALL LIGHT IN REACH. WILL CONTINUE TO MONITOR.

## 2020-05-10 NOTE — NUR
CHANGED DRESSING TO LEFT BREAST. REMOVED OLD DRESSING. REMOVED PACKING.
CLEANSED WOUND. PACKED WOUND WITH WET KERLEX. APPLIED DRY 4X4 AND TAPED OVER.

## 2020-05-11 VITALS — DIASTOLIC BLOOD PRESSURE: 68 MMHG | SYSTOLIC BLOOD PRESSURE: 174 MMHG

## 2020-05-11 VITALS — DIASTOLIC BLOOD PRESSURE: 59 MMHG | SYSTOLIC BLOOD PRESSURE: 182 MMHG

## 2020-05-11 VITALS — SYSTOLIC BLOOD PRESSURE: 169 MMHG | DIASTOLIC BLOOD PRESSURE: 63 MMHG

## 2020-05-11 VITALS — SYSTOLIC BLOOD PRESSURE: 161 MMHG | DIASTOLIC BLOOD PRESSURE: 59 MMHG

## 2020-05-11 VITALS — SYSTOLIC BLOOD PRESSURE: 142 MMHG | DIASTOLIC BLOOD PRESSURE: 50 MMHG

## 2020-05-11 VITALS — DIASTOLIC BLOOD PRESSURE: 51 MMHG | SYSTOLIC BLOOD PRESSURE: 159 MMHG

## 2020-05-11 LAB
ANION GAP SERPL CALC-SCNC: 13.2 MMOL/L (ref 8–16)
BUN SERPL-MCNC: 43 MG/DL (ref 7–18)
CALCIUM SERPL-MCNC: 7.8 MG/DL (ref 8.5–10.1)
CHLORIDE SERPL-SCNC: 110 MMOL/L (ref 98–107)
CO2 SERPL-SCNC: 23.5 MMOL/L (ref 21–32)
CREAT SERPL-MCNC: 2.3 MG/DL (ref 0.6–1.3)
ERYTHROCYTE [DISTWIDTH] IN BLOOD BY AUTOMATED COUNT: 14 % (ref 11.5–14.5)
GLUCOSE SERPL-MCNC: 142 MG/DL (ref 74–106)
HCT VFR BLD CALC: 26.5 % (ref 36–48)
HGB BLD-MCNC: 8.6 G/DL (ref 12–16)
LYMPHOCYTES NFR BLD AUTO: 24.2 % (ref 15–50)
MCH RBC QN AUTO: 29.5 PG (ref 26–34)
MCHC RBC AUTO-ENTMCNC: 32.5 G/DL (ref 31–37)
MCV RBC: 90.8 FL (ref 80–100)
NEUTROPHILS NFR BLD AUTO: 67.1 % (ref 40–80)
OSMOLALITY SERPL CALC.SUM OF ELEC: 295 MOSM/KG (ref 275–300)
PLATELET # BLD: 358 10X3/UL (ref 130–400)
PMV BLD AUTO: 7.8 FL (ref 7.4–10.4)
POTASSIUM SERPL-SCNC: 4.7 MMOL/L (ref 3.5–5.1)
RBC # BLD AUTO: 2.92 10X6/UL (ref 4–5.4)
SODIUM SERPL-SCNC: 142 MMOL/L (ref 136–145)
WBC # BLD AUTO: 8.3 10X3/UL (ref 4.8–10.8)

## 2020-05-11 NOTE — NUR
Nuitrition follow-up:
Diet: ADA consistent CHO
PO intake ~50% average of last 6 meals; improved
Labs reviewed
Wt: 295#
RDN following.

## 2020-05-11 NOTE — NUR
SITTING UP IN BED WATCHING TV. ALERT AND ORIENTED X4. RESP EVEN AND
NONLABORED. NONPROD COUGH NOTED. SCDS IN USE BILAT. DRSG C/D/I TO LT BREAST.
RATES PAIN AS 4 IN LT BREAST WOUND. DRSG NOTED TO RT GREAT TOE IS C/D/I. 2ND
TOE ON LT FOOT IS AMPUTATES. EDEMA NOTED TO BLE. AMB WITH CANE WITH ASSIST.
SALINE LOCK NOTED TO LT UPPER ARM. SR ELEVATED X2. CL IN REACH.

## 2020-05-11 NOTE — NUR
PATIENT STATES FEELS URGE TO VOID. DEVINE UNCLAMPED. ABOUT 50CC DRAINED. DEVINE
REMOVED PER ORDER. WILL CONTINUE TO MONITOR.

## 2020-05-11 NOTE — NUR
SPOKE WITH DR BARFIELD WHO STATES HOPES TO GET PATIENT DISCHARGED TOMORROW
MORNING. NOTIFIED THAT PATIENT SEES DR LOMBARDI OUTSIDE OF HOSPITAL FOR RIGHT
GREAT TOE AND WANTS TO SEE DR LOMBARDI IF STAYING LONGER THAN TONIGHT. DR BARFIELD STATES OK AND TO START BLADDER TRAINING TO REMOVE PATIENT'S DEVINE
PRIOR TO DC.

## 2020-05-11 NOTE — NUR
ALERT AND ORIENTED. LUNGS CLEAR BILATERALLY. HEART SOUNDS S1 AND S2 HEARD IN
ALL FIELDS. BOWEL SOUNDS ACTIVE X 4. SKIN INTACT WITHOUT REDNESS. IV TO LEILA
PATENT WITHOUT REDNESS. DRSG TO LEFT BREAST C/D/I. WILL CHANGE TODAY. DENIES
PAIN. DENIES NEEDS. BED LOW. CALL LAMB AND PERSONAL ITEMS IN REACH. WILL
CONTINUE TO MONITOR.

## 2020-05-11 NOTE — NUR
DRSG CHANGED TO LEFT BREAST PER ORDER. DRSG CHANGED TO RIGHT GREAT TOE.
PATIENT NORMAL SEES DR LOMBARDI FOR NECROTIC AREA TO RIGHT GREAT TOE. WILL
SPEAK WITH MD ABOUT CONSULTING DR LOMBARDI IF STAYING ANOTHER NIGHT PER PATIENT
REQUEST.

## 2020-05-12 VITALS — SYSTOLIC BLOOD PRESSURE: 137 MMHG | DIASTOLIC BLOOD PRESSURE: 59 MMHG

## 2020-05-12 VITALS — SYSTOLIC BLOOD PRESSURE: 159 MMHG | DIASTOLIC BLOOD PRESSURE: 67 MMHG

## 2020-05-12 VITALS — DIASTOLIC BLOOD PRESSURE: 50 MMHG | SYSTOLIC BLOOD PRESSURE: 148 MMHG

## 2020-05-12 LAB
ALBUMIN SERPL-MCNC: 2.3 G/DL (ref 3.4–5)
ALP SERPL-CCNC: 120 U/L (ref 30–120)
ALT SERPL-CCNC: 22 U/L (ref 10–68)
ANION GAP SERPL CALC-SCNC: 15.7 MMOL/L (ref 8–16)
BILIRUB SERPL-MCNC: 0.29 MG/DL (ref 0.2–1.3)
BUN SERPL-MCNC: 38 MG/DL (ref 7–18)
CALCIUM SERPL-MCNC: 8.3 MG/DL (ref 8.5–10.1)
CHLORIDE SERPL-SCNC: 107 MMOL/L (ref 98–107)
CO2 SERPL-SCNC: 23.7 MMOL/L (ref 21–32)
CREAT SERPL-MCNC: 2 MG/DL (ref 0.6–1.3)
ERYTHROCYTE [DISTWIDTH] IN BLOOD BY AUTOMATED COUNT: 14 % (ref 11.5–14.5)
GLOBULIN SER-MCNC: 4.8 G/L
GLUCOSE SERPL-MCNC: 95 MG/DL (ref 74–106)
HCT VFR BLD CALC: 31.6 % (ref 36–48)
HGB BLD-MCNC: 10.3 G/DL (ref 12–16)
LYMPHOCYTES NFR BLD AUTO: 23.6 % (ref 15–50)
MCH RBC QN AUTO: 29.3 PG (ref 26–34)
MCHC RBC AUTO-ENTMCNC: 32.6 G/DL (ref 31–37)
MCV RBC: 90 FL (ref 80–100)
NEUTROPHILS NFR BLD AUTO: 66.4 % (ref 40–80)
OSMOLALITY SERPL CALC.SUM OF ELEC: 291 MOSM/KG (ref 275–300)
PLATELET # BLD: 534 10X3/UL (ref 130–400)
PMV BLD AUTO: 7.9 FL (ref 7.4–10.4)
POTASSIUM SERPL-SCNC: 4.4 MMOL/L (ref 3.5–5.1)
PROT SERPL-MCNC: 7.1 G/DL (ref 6.4–8.2)
RBC # BLD AUTO: 3.51 10X6/UL (ref 4–5.4)
SODIUM SERPL-SCNC: 142 MMOL/L (ref 136–145)
WBC # BLD AUTO: 12.2 10X3/UL (ref 4.8–10.8)

## 2020-05-12 NOTE — NUR
PATIENT'S IV OCCLUDED TO LEILA. ABX UNABLE TO RUN. SPOKE WITH DR IQBAL ON UNIT
WHO IS ON CALL FOR DR BARFIELD. STATES PATIENT DISCHARGING TODAY AND NO
NEED FOR NEW IV. STATES PLACING DC ORDER NOW.

## 2020-05-12 NOTE — NUR
ASSISTED UP TO BR TO VOID AND THEN BACK TO BED. C/O "SHAKINESS. FSBS 98. ISNT
USED TO IT BEING WNL. REQUESTS SNACK. INSULIN WAS HELD LAST NIGHT DUE TO FEAR
OF DROPPING THIS A.M.

## 2020-05-12 NOTE — NUR
ALERT AND ORIENTED. LUNGS CLEAR BILATERALLY. HEART SOUNDS S1 AND S2 HEARD IN
ALL FIELDS. BOWEL SOUNDS ACTIVE X 4. DRSG TO LEFT BREAST C/D/I. WILL CHANGE
TODAY. DRSG TO RIGHT GREAT TOE C/D/I. IV TO LEILA PATENT WITOUT REDNESS. DENIES
PAIN. DENIES NEEDS. BED LOW. CALL LAMB AND PERSONAL ITEMS IN REACH. WILL
CONTINUE TO MONITOR.

## 2020-05-12 NOTE — NUR
DISCHARGE EDUCATION PROVIDED BOTH WRITTEN AND VERBAL. VERBALIZED
UNDERSTANDING. DENIES FURTHER QUESTIONS. PATIENT WAITING RIDE.

## 2020-05-15 ENCOUNTER — HOSPITAL ENCOUNTER (INPATIENT)
Dept: HOSPITAL 84 - D.ER | Age: 60
LOS: 5 days | Discharge: HOME | DRG: 280 | End: 2020-05-20
Attending: FAMILY MEDICINE | Admitting: FAMILY MEDICINE
Payer: COMMERCIAL

## 2020-05-15 VITALS
BODY MASS INDEX: 43.65 KG/M2 | BODY MASS INDEX: 43.65 KG/M2 | HEIGHT: 68 IN | HEIGHT: 68 IN | WEIGHT: 288.04 LBS | BODY MASS INDEX: 43.65 KG/M2 | WEIGHT: 288.04 LBS

## 2020-05-15 DIAGNOSIS — E11.22: ICD-10-CM

## 2020-05-15 DIAGNOSIS — I25.10: ICD-10-CM

## 2020-05-15 DIAGNOSIS — N18.9: ICD-10-CM

## 2020-05-15 DIAGNOSIS — E83.42: ICD-10-CM

## 2020-05-15 DIAGNOSIS — E66.01: ICD-10-CM

## 2020-05-15 DIAGNOSIS — N17.9: ICD-10-CM

## 2020-05-15 DIAGNOSIS — I21.4: Primary | ICD-10-CM

## 2020-05-15 DIAGNOSIS — I12.9: ICD-10-CM

## 2020-05-15 DIAGNOSIS — E86.0: ICD-10-CM

## 2020-05-15 DIAGNOSIS — E78.5: ICD-10-CM

## 2020-05-15 DIAGNOSIS — E11.40: ICD-10-CM

## 2020-05-15 DIAGNOSIS — G93.41: ICD-10-CM

## 2020-05-15 LAB
ALBUMIN SERPL-MCNC: 2.9 G/DL (ref 3.4–5)
ALP SERPL-CCNC: 120 U/L (ref 30–120)
ALT SERPL-CCNC: 21 U/L (ref 10–68)
AMPHETAMINES UR QL SCN: NEGATIVE QUAL
ANION GAP SERPL CALC-SCNC: 19.2 MMOL/L (ref 8–16)
APTT BLD: 33 SECONDS (ref 22.8–39.4)
BACTERIA #/AREA URNS HPF: (no result) /HPF
BARBITURATES UR QL SCN: NEGATIVE QUAL
BASOPHILS NFR BLD AUTO: 0.1 % (ref 0–2)
BENZODIAZ UR QL SCN: NEGATIVE QUAL
BILIRUB SERPL-MCNC: 0.58 MG/DL (ref 0.2–1.3)
BILIRUB SERPL-MCNC: NEGATIVE MG/DL
BUN SERPL-MCNC: 30 MG/DL (ref 7–18)
BZE UR QL SCN: NEGATIVE QUAL
CALCIUM SERPL-MCNC: 8.4 MG/DL (ref 8.5–10.1)
CANNABINOIDS UR QL SCN: NEGATIVE QUAL
CHLORIDE SERPL-SCNC: 100 MMOL/L (ref 98–107)
CK MB SERPL-MCNC: 5.2 U/L (ref 0–3.6)
CK SERPL-CCNC: 308 UL (ref 21–215)
CO2 SERPL-SCNC: 21.8 MMOL/L (ref 21–32)
CREAT SERPL-MCNC: 1.7 MG/DL (ref 0.6–1.3)
EOSINOPHIL NFR BLD: 0.3 % (ref 0–7)
ERYTHROCYTE [DISTWIDTH] IN BLOOD BY AUTOMATED COUNT: 14 % (ref 11.5–14.5)
GLOBULIN SER-MCNC: 4.4 G/L
GLUCOSE SERPL-MCNC: 1000 MG/DL
GLUCOSE SERPL-MCNC: 345 MG/DL (ref 74–106)
HCT VFR BLD CALC: 30.7 % (ref 36–48)
HGB BLD-MCNC: 9.9 G/DL (ref 12–16)
IMM GRANULOCYTES NFR BLD: 0.6 % (ref 0–5)
INR PPP: 1.11 (ref 0.85–1.17)
KETONES UR STRIP-MCNC: (no result) MG/DL
LYMPHOCYTES NFR BLD AUTO: 15 % (ref 15–50)
MAGNESIUM SERPL-MCNC: 1.1 MG/DL (ref 1.8–2.4)
MCH RBC QN AUTO: 29.3 PG (ref 26–34)
MCHC RBC AUTO-ENTMCNC: 32.2 G/DL (ref 31–37)
MCV RBC: 90.8 FL (ref 80–100)
MONOCYTES NFR BLD: 5.4 % (ref 2–11)
NEUTROPHILS NFR BLD AUTO: 78.6 % (ref 40–80)
NITRITE UR-MCNC: NEGATIVE MG/ML
OPIATES UR QL SCN: POSITIVE QUAL
OSMOLALITY SERPL CALC.SUM OF ELEC: 293 MOSM/KG (ref 275–300)
PCP UR QL SCN: NEGATIVE QUAL
PH UR STRIP: 7 [PH] (ref 5–6)
PLATELET # BLD: 314 10X3/UL (ref 130–400)
PMV BLD AUTO: 8.3 FL (ref 7.4–10.4)
POTASSIUM SERPL-SCNC: 4 MMOL/L (ref 3.5–5.1)
PROT SERPL-MCNC: 7.3 G/DL (ref 6.4–8.2)
PROTHROMBIN TIME: 14.2 SECONDS (ref 11.6–15)
RBC # BLD AUTO: 3.38 10X6/UL (ref 4–5.4)
RBC #/AREA URNS HPF: (no result) /HPF (ref 0–5)
SODIUM SERPL-SCNC: 137 MMOL/L (ref 136–145)
SP GR UR STRIP: 1.01 (ref 1–1.02)
TROPONIN I SERPL-MCNC: < 0.017 NG/ML (ref 0–0.06)
TSH SERPL-ACNC: 4.91 UIU/ML (ref 0.36–3.74)
UROBILINOGEN UR-MCNC: NORMAL MG/DL
WBC # BLD AUTO: 14.1 10X3/UL (ref 4.8–10.8)
WBC #/AREA URNS HPF: (no result) /HPF

## 2020-05-15 NOTE — NUR
PT STATES "I NEED TO USE THE RESTROOM." PT ASSISTED WITH BEDPAN. CLEAN DRY
GOWN AND BRIEF PLACED ON PT. PT TOLERATED WELL.

## 2020-05-15 NOTE — HEMODYNAMI
PATIENT:MIREYA BENOIT                               MEDICAL RECORD: [id number]
: 64                                            LOCATION:D.     D.2109
[facility]# [id number]                                       ADMISSION DATE: 20
 
 
 Generatedon:20209:38
Patient name: MIREYA BENOIT Patient #: [id number] Visit #: [id number] SSN: [id number]
- :
1960 Date of study: 2020
Page: Of
Hemodynamic Procedure Report
****************************
Patient Data
Patient Demographics
Procedure consent was obtained
First Name: MIREYA          Gender: Female
Last Name: KAYCEE           : 1960
[facility] Initial: ASHOK        Age: 59 year(s)
Patient #: [id number]       Race: 
Visit #: [id number]
SSN: 
Accession #:
[id number]
Additional ID: [id number]
Contact details
Address: [address] [place] [address]       Phone: [phone]
State: [place]
City: [place]
Zip code: [address]
Past Medical History
Allergies
Allergen        Reaction        Date         Comments
Reported
Codeine                         2016
Other allergy                   2016    TYLENOL
Codeine                         2020
Admission
Admission Data
Admission Date: 2020   Admission Time: 0:56
Arrival Date: 2020     Arrival Time: 0:00
Admit Source: Other         Insurance Payor: Private
Room #: D.2109              health insurance
[facility] #: [id number]
Height (in.): 67.72         BSA: 2.37 (m2)
Height (cm.): 172           BMI: 43.6 (kg/m2)
Weight (lbs.): 284.4
Weight (kg.): 129
Lab Results
Lab Result Date: 2020  Lab Result Time: 0:00
Biochemistry
Name         Units    Result                Min      Max
BUN          mg/dl    35       --(----)-*   7        18
Creatinine   mg/dl    1.4      --(----)*-   0.6      1.3
CBC
Name         Units    Result                Min      Max
Hemoglobin   g/dl     9.6      *-(----)--   13.5     17.5
Procedure
Procedure Types
Cath Procedure
 
Diagnostic Procedure
LTAC, located within [facility] w/Coronaries
Sedation Charges
Moderate Sedation up to 15 minutes
Procedure Description
Procedure Date
Procedure Date: 2020
Procedure Start Time: 9:21
Procedure End Time: 9:37
Procedure Staff
Name                            Function
Rich Carranza MD              Performing Physician
Daisy Palmer RN                Monitor
Aliza Rios RT               Monitor
Diasy Palmer RN                Nurse
Kelly Walls RT               Scrub
Procedure Data
Cath Procedure
Fluoroscopy
Diagnostic fluoroscopy      Total fluoroscopy Time: 3.3
time: 3.3 min               min
Diagnostic fluoroscopy      Total fluoroscopy dose: 844
dose: 844 mGy               mGy
Contrast Material
Contrast Material Type                       Amount (ml)
Isovue 300                                   68
Entry Location
Entry     Primary  Successful  Side  Size  Upsize Upsize Entry    Closure     Henao
ccessful  Closure
Location                             (Fr)  1 (Fr) 2 (Fr) Remarks  Device        
          Remarks
Radial                         Right 6 Fr                         Mechanical
artery                               Short                        Compression
Estimated blood loss: 5 ml
Diagnostic catheters
Device Type               Used For           End Catheter
Placement
DIAGNOSTIC AR2 MOD 5 Fr   Procedure
catheter ([id number])
DIAGNOSTIC Tiger 110cm 5  Procedure
Fr catheter (712310)
Procedure Complications
No complications
Procedure Medications
Medication           Administration Route Dosage
0.9% NaCl            I.V.                 100 ml/hr
Oxygen               etCO2 Nasal cannula  2 l/min
Lidocaine 2%         added to field       20
Heparin Flush Bag    added to field       2 bags
(1000units/500ml NS)
Radial Cocktail      added to field       1 syringe
(Verapamil 2mg/Nitro
400mcg/Heparin
1500units)
Versed               I.V.                 2 mg
Fentanyl             I.V.                 50 mcg
Fentanyl             I.V.                 50 mcg
Hemodynamics
Rest
 
BSA: 2.37 (m2) HGB: 9.6 (g/dl) O2 Consumption: Estimated: 218.36 (ml/min) O2 Con
sumption indexed:
Estimated:92.14 (ml/min/m) Heart Rate: 61 (bpm)
Pressure Samples
Time     Site     Value (mmHg) Purpose      Heart      Use
Rate(bpm)
9:26     LV       165/14,24    Snapshot     51
Gradients
Valve  Time  Site Site Mean    SEP/DFP    Peak To Heart  Use
1    2    (mmHg)  (sec/min)  Peak    Rate
(mmHg)  (bpm)
Aortic 9:27  LV   AO                              61
Snapshots
Pre Cath      Intra         NCS           Post Cath
Vital Signs
Time    Heart  Resp   SPO2 etCO2   NIBP (mmHg) Rhythm  Pain    Sedation
Rate   (ipm)  (%)  (mmHg)                      Status  Level
(bpm)
9:15:30 59     15     100  32.1    Measuring   SB      0 (11)  10(A)
, No
pain
9:15:50 59     15     100  34.3    186/80(150) SB      0 (11)  10(A)
, No
pain
9:20:49 54     13     97   40.3    Measuring   SB      0 (11)  10(A)
, No
pain
9:21:14 54     13     97   31.4    168/72(137) SB      0 (11)  10(A)
, No
pain
9:26:09 61     11     98   38      144/62(113) NSR     0 (11)  10(A)
, No
pain
9:31:02 55     15     97   38.8    166/73(139) NSR     0 (11)  10(A)
, No
pain
9:36:01 56     11     95   38.9    Measuring   NSR     0 (11)  10(A)
, No
pain
9:36:07 56     10     96   38.8    157/68(120) NSR     0 (11)  10(A)
, No
pain
Medications
Time    Medication       Route   Dose    Verified Delivered Reason     Notes  Ef
fectiveness
by       by
9:13:51 0.9% NaCl        I.V.    100     Rich Villa     used for
ml/hr   Tere  Spencer   procedure
MD CARBONE
9:13:58 Oxygen           etCO2   2 l/min Rich Villa     used for
Nasal           Tere  Spencer   procedure
cannula         MD       RN
9:14:03 Lidocaine 2%     added   20ml    Rich Villanueva   for local
to      vial    Tere  Tere   anesthetic
field           MD       MD
9:14:07 Heparin Flush    added   2 bags  Rich Villanueva   used for
Bag              to              Tere  Tere   procedure
(1000units/500ml field MD SMART
NS)
9:14:13 Radial Cocktail  added   1       Rich Villanueva   used for
 
(Verapamil       to      syringe Tere  Tere   procedure
2mg/Nitro        field           MD       MD
400mcg/Heparin
1500units)
9:19:01 Fentanyl         I.V.    50 mcg  Rich Arevaloa     for
Tere  Spencer   sedation
MD CARBONE
9:19:51 Versed           I.V.    2 mg    Rich Arevaloa     for
Tere  Spencer   sedation
MD CARBONE
9:27:32 Fentanyl         I.V.    50 mcg  Rich Romeroyla     for
Tere  Spencer   sedation
MD CARBONE
Procedure Log
Time     Note
8:44:06  Arrival Date: 2020 12:00:00 AM
8:44:28  Insurance Payor : Private health insurance
8:44:30  Admit Source: Other
8:44:35  Patient Height : 67.72 inches
8:44:42  Patient Weight : 284.4 lbs
8:45:15  Lab Result : Hemoglobin 9.6 g/dl
8:45:15  Lab Result : Creatinine 1.4 mg/dl
8:45:15  Lab Result : BUN 35 mg/dl
8:46:00  Procedure Status Urgent Heart Cath (IP).
8:49:40  Kelly Katzur RT(R) sent for patient. Start room use.
9::40  Vital chart was started
9:13:41  Time tracking: Regular hours (M-F 7:00 - 5:00)
9::45  Plan of Care:Hemodynamics will remain stable., Cardiac
rhythm will remain stable., Comfort level will be
maintained., Respiratory function will remain
adequate., Patient/ family verbilizes understanding of
procedure., Procedure tolerated without complication.,
Recovers from procedure without complications..
9:13:50  Patient received from Med II to CCL 1 Alert and
oriented. Tansferred to table in Supine position.
9:13:51  0.9% NaCl 100 ml/hr I.V. was administered by Daisy Palmer RN; used for procedure; Verbal order read back
and verified.
9:13:53  Signed procedure consent form obtained from patient.
9:13:58  Oxygen 2 l/min etCO2 Nasal cannula was administered by
Daisy Palmer RN; used for procedure; Verbal order
read back and verified.
9:14:03  Lidocaine 2% 20ml vial added to field was administered
by Rich Carranza MD; for local anesthetic; Verbal
order read back and verified.
9:14:07  Heparin Flush Bag (1000units/500ml NS) 2 bags added to
field was administered by Rich Carranza MD; used for
procedure; Verbal order read back and verified.
9:14:13  Radial Cocktail (Verapamil 2mg/Nitro 400mcg/Heparin
1500units) 1 syringe added to field was administered
by Rich Carranza MD; used for procedure; Verbal
order read back and verified.
9:15:13  Warm blankets applied, and anne marie hugger turned on for
patient comfort.
9:15:13  Correct patient and procedure confirmed by team.
9:15:14  Baseline sample Acquired.
9:15:14  ECG and BP/O2 sat monitors applied to patient.
9:15:19  Rhythm: sinus bradycardia
9:15:25  Full Disclosure recording started
9:15:28  H&P Date Dictated: 2020 ER History on chart..
 
9:15:28  Pre-procedure instructions explained to patient.
9:15:28  Pre-op teaching completed and patient verbalized
understanding.
9:15:30  Family unavailable.
9:15:35  Patient NPO since Midnight.
9:15:44  Patient allergic to Codeine
9:15:48  Is the patient allergic to Iodine/contrast media? No.
9:15:50  Is patient on blood thinner?No
9:15:52  Patient diabetic? Yes.
9:15:55  Previous problem with sedation/anesthesia? No ?
9:15:55  Snore? Yes
9:15:57  Sleep apnea? No
9:15:59  Deviated septum? No
9:16:00  Opens mouth fully? Yes
9:16:00  Sticks out tongue? Yes
9:16:02  Airway obstruction? No ?
9:16:05  Dentures? No ?
9:16:08  Pre procedure: right dorsailis pedis pulse 1+
Palpable, but thready & weak; easily obliterated
9:16:10  Modified Kyle's test Ulnar < 7 seconds
9:16:12  Patient pain scale 0/10 ?.
9:16:16  IV patent on arrival in left forearm with 0.9% NaCl at
O.
9:16:19  Lab results completed and on chart.
9:16:22  Right Radial & Right Groin area was prepped with
chlora-prep and draped in sterile fashion
9:16:23  Alarms reviewed by R. N.
9:16:23  Sharps counted by scrub and verified by R.N.
9:18:27  --------ALL STOP TIME OUT------
9:18:28  Final Timeout: patient, procedure, and site verified
with staff and physician. All members of the team are
in agreement.
9:18:29  Right Radial & Right Groin site verified by team.
9:18:38  Fire Safety Assessment: A--An alcohol-based skin
anteseptic being used preoperatively., C--Open oxygen
or nitrous oxide is being used., D--An ESU, laser, or
fiber-optic light is being used.
9:18:41  Physical assessment completed. ASA score P 2 - A
patient with mild systemic disease as per Rich Carranza MD.
9:18:45  3b) 30-44 Moderately reduced kidney function.
9:18:47  Maximum allowable contrast dose (3.7 X eGFR X 0.75)114
ml.
9:18:50  Sedation plan: IV Moderate Sedation Medication:Versed,
Fentanyl
9:19:01  Fentanyl 50 mcg I.V. was administered by Daisy Palmer
RN; for sedation; Verbal order read back and verified.
9:19:51  Versed 2 mg I.V. was administered by Daisy Palmer RN;
for sedation; Verbal order read back and verified.
9:21:23  Zero performed for pressure channel P1
9:21:40  Procedure started.
9:21:42  Use device set Radial Dx or PCI
9:21:44  ACIST Syringe (65769) opened to sterile field.
9:21:44  Bag Decanter (2002S) opened to sterile field.
9:21:45  ACIST Hand Control (44704) opened to sterile field.
9:21:45  ACIST Manifold (42771) opened to sterile field.
9:21:46  Tegaderm 4 x 4 (1626W) opened to sterile field.
9:21:47  Medline Cath Pack ([id number]) opened to sterile field.
9:21:48  MBrace Wrist Support ([id number]) opened to sterile
field.
 
9:21:49  EMERALD Guide Wire (062-478) opened to sterile field.
9:21:50  SHEATH 6FR RAIN (4170192) opened to sterile field.
9:21:59  Local anesthetic to right radial artery with Lidocaine
2% by Rich Carranza MD.**INITIAL ACCESS ONLY**
9:22:59  A 6 Fr Short sheath was inserted into the Right Radial
artery
9:23:30  A DIAGNOSTIC Tiger 110cm 5 Fr catheter (471849) was
advanced over the wire and used for Procedure.
9:24:27  LV gram done using DELUCA
9:24:30  Injector settings: Ml/sec: 5, Volume: 15,
9:26:37  LV hemodynamics recorded.
9:27:16  EF : 55 %
9:27:32  Fentanyl 50 mcg I.V. was administered by Daisy Palmer
RN; for sedation; Verbal order read back and verified.
9:28:51  LCA angiography performed.
9:30:18  Catheter exchanged over wire.
9:30:26  UNABLE TO ENGAGE RCA
9:32:14  A DIAGNOSTIC AR2 MOD 5 Fr catheter ([id number]) was
advanced over the wire and used for Procedure.
9:32:19  RCA angiography performed.
9:32:20  **ACC**Dominant side:Left
9:32:22  Catheter removed.
9:32:33  Procedure ended.(Physican Out)
9:33:36  ZEPHYR REGULAR TR BAND (405690) opened to sterile
field.
9:33:49  Sheath removed intact; hemostasis achieved with
Mechanical Compression to the Right Radial artery.
9:34:18  Fluoroscopy time 03.30 minutes.
9:34:25  Contrast amount:Isovue 300 68ml.
9:34:30  Flurop Dose total: 844
9:34:30  Fluoroscopy dose: 844 mGy
9:34:36  Dose Area Product 18513 mGy/cm.
9:34:39  Maximum allowable dose exceeded? No.
9:34:40  Sharps counted by scrub and verified by R.N.
9:34:53  Crestline band inflated with 10cc of air.
9:35:00  Insertion/operative site no bleeding no hematoma.
9:35:04  Post-procedure physical assessment completed. ASA
score P 2 - A patient with mild systemic disease as
per Rich Carranza MD.
9:35:08  Post procedure rhythm: sinus bradycardia
9:35:10  Estimated blood loss: 5 ml
9:35:11  Post procedure instruction explained to
patient.Patient verbalizes understanding.
9:35:11  Patient needs reinforcement of post procedure
teaching.
9:35:24  Patient needs reinforcement of post procedure
teaching.
9:35:30  Procedure type changed to Cath procedure, Diagnostic
procedure, LHC, C w/Coronaries, Sedation Charges,
Moderate Sedation up to 15 minutes
9:36:35  Procedure and supply charges have been captured,
reviewed, submitted and are correct.
9:36:37  Procedure Complication : No complications
9:36:39  Vital chart was stopped
9:36:40  [facility] Findings: mild to moderate CAD (<70%)
9:36:42  Operative report dictated upon procedure completion.
9:36:43  See physician's report for complete and final results.
9:36:52  Report given to [facility].
9:37:09  Patient transfered to [facility] with Bed.
9:37:10  Procedure ended.
 
9:37:10  Full Disclosure recording stopped
9:37:13  End room use (Document Last)
9:37:53  End room use (Document Last)
9:38:12  End room use (Document Last)
Device Usage
Item Name   Manufacture  Quantity  Catalog      Hospital Part     Current Minima
l Lot# /
Number       Charge   Number   Stock   Stock   Serial#
Code
ACIST       Acist        1         57075        075910   106150   144782  20
Syringe     Medical
(70091)     Systems Inc
Bag         Microtek     1         S        539751   36316    965843  5
Decanter    Medical Inc.
()
ACIST Hand  Acist        1         69949        001394   843003   954638  5
Control     Medical
(92762)     Systems Inc
ACIST       Acist        1         51428        669240   481507   375785  5
Manifold    Medical
(09486)     Systems Inc
Tegaderm 4  3M           1         1626W        812908   647488   137946  5
x 4 (1626W)
Medline     Medline      1         [id number]    698216   87858    292555  5
Cath Pack
([id number])
MBrace      Advanced     1         [id number]  682899   38322    378374  5
Wrist       Vascular
Support     Dynamics
([id number])
EMERALD     Cardinal     1         502-455      983592   812873   220548  5
Guide Wire  Health
(502-455)
SHEATH 6FR  Cardinal     1         5720657      655145   7869300  060469  5
RAIN        Health
(0074699)
DIAGNOSTIC  Cardinal     1         715012Q      508245   186312   371960  20
AR2 MOD 5   Health
Fr catheter
(140642M)
ZEPHYR      Cardinal     1         762651       191429   8974470  800906  5
REGULAR TR  Health
BAND
(662771)
DIAGNOSTIC  Terumo       1               868209   636342   176811  5
Tiger 110cm
5 Fr
catheter
(595867)
Signature Audit [place]
Stage           Time        Signature      Unsigned
Intra-Procedure 2020   Aliza Rios
9:37:53 AM  RT(R)
Intra-Procedure 2020   Daisy Palmer
9:38:12 AM  RN
 
Intra-Procedure 2020   Rich Shah
9:38:48 AM  Gary SMART
 
 
 
 
 
 
 
 
 
 
 
 
 
 
 
 
 
 
 
 
 
 
 
 
 
 
 
 
 
 
 
 
 
 
 
 
 
 
 
 
 
 
 
 
 
 
 
 
 
 
 
 
 
[facility]                                          
[address] [place] [address]

## 2020-05-16 VITALS — DIASTOLIC BLOOD PRESSURE: 77 MMHG | SYSTOLIC BLOOD PRESSURE: 182 MMHG

## 2020-05-16 VITALS — DIASTOLIC BLOOD PRESSURE: 77 MMHG | SYSTOLIC BLOOD PRESSURE: 200 MMHG

## 2020-05-16 VITALS — DIASTOLIC BLOOD PRESSURE: 59 MMHG | SYSTOLIC BLOOD PRESSURE: 164 MMHG

## 2020-05-16 VITALS — DIASTOLIC BLOOD PRESSURE: 87 MMHG | SYSTOLIC BLOOD PRESSURE: 182 MMHG

## 2020-05-16 VITALS — SYSTOLIC BLOOD PRESSURE: 158 MMHG | DIASTOLIC BLOOD PRESSURE: 77 MMHG

## 2020-05-16 VITALS — SYSTOLIC BLOOD PRESSURE: 141 MMHG | DIASTOLIC BLOOD PRESSURE: 60 MMHG

## 2020-05-16 VITALS — SYSTOLIC BLOOD PRESSURE: 114 MMHG | DIASTOLIC BLOOD PRESSURE: 48 MMHG

## 2020-05-16 LAB
ALBUMIN SERPL-MCNC: 2.6 G/DL (ref 3.4–5)
ALP SERPL-CCNC: 113 U/L (ref 30–120)
ALT SERPL-CCNC: 22 U/L (ref 10–68)
ANION GAP SERPL CALC-SCNC: 19.1 MMOL/L (ref 8–16)
BASOPHILS NFR BLD AUTO: 0.1 % (ref 0–2)
BILIRUB SERPL-MCNC: 0.55 MG/DL (ref 0.2–1.3)
BUN SERPL-MCNC: 36 MG/DL (ref 7–18)
CALCIUM SERPL-MCNC: 7.7 MG/DL (ref 8.5–10.1)
CHLORIDE SERPL-SCNC: 98 MMOL/L (ref 98–107)
CK MB SERPL-MCNC: 3.9 U/L (ref 0–3.6)
CK SERPL-CCNC: 222 UL (ref 21–215)
CO2 SERPL-SCNC: 20.3 MMOL/L (ref 21–32)
CREAT SERPL-MCNC: 1.8 MG/DL (ref 0.6–1.3)
EOSINOPHIL NFR BLD: 0 % (ref 0–7)
ERYTHROCYTE [DISTWIDTH] IN BLOOD BY AUTOMATED COUNT: 14 % (ref 11.5–14.5)
GLOBULIN SER-MCNC: 3.8 G/L
GLUCOSE SERPL-MCNC: 488 MG/DL (ref 74–106)
HCT VFR BLD CALC: 27.5 % (ref 36–48)
HGB BLD-MCNC: 8.9 G/DL (ref 12–16)
IMM GRANULOCYTES NFR BLD: 0.5 % (ref 0–5)
LYMPHOCYTES NFR BLD AUTO: 4.8 % (ref 15–50)
MAGNESIUM SERPL-MCNC: 1.4 MG/DL (ref 1.8–2.4)
MCH RBC QN AUTO: 29.1 PG (ref 26–34)
MCHC RBC AUTO-ENTMCNC: 32.4 G/DL (ref 31–37)
MCV RBC: 89.9 FL (ref 80–100)
MONOCYTES NFR BLD: 1.4 % (ref 2–11)
NEUTROPHILS NFR BLD AUTO: 93.2 % (ref 40–80)
OSMOLALITY SERPL CALC.SUM OF ELEC: 295 MOSM/KG (ref 275–300)
PHOSPHATE SERPL-MCNC: 4.1 MG/DL (ref 2.5–4.9)
PLATELET # BLD: 252 10X3/UL (ref 130–400)
PMV BLD AUTO: 8.3 FL (ref 7.4–10.4)
POTASSIUM SERPL-SCNC: 4.4 MMOL/L (ref 3.5–5.1)
PROT SERPL-MCNC: 6.4 G/DL (ref 6.4–8.2)
RBC # BLD AUTO: 3.06 10X6/UL (ref 4–5.4)
SODIUM SERPL-SCNC: 133 MMOL/L (ref 136–145)
TROPONIN I SERPL-MCNC: 0.12 NG/ML (ref 0–0.06)
WBC # BLD AUTO: 16.5 10X3/UL (ref 4.8–10.8)

## 2020-05-16 NOTE — NUR
ROUNDING DONE WITH PATIENT UNABLE TO TELL ME HER DATE OF BIRTH OR WHAT YEAR IT
IS. SPILLED WATER ON HERSELF, GOWN CHANGED. LEFT AC PIV SEEN WITH NS INFUSING
 CC/HR. BILATERAL SCD'S ARE ON ALONG WITH ALL FALL RISK. BED ALARM ON.
LAB TO CALL WITH CRITICAL GLUCOSE  AND TROPOIN OF 0.120. PAGE INTO DR IQBAL, AWAITING CALL BACK. MORBID OBESE PATIENT. DRESSING DRY AND INTACT SEEN
TO LEFT BREAST. UMBILICAL HERNIA SEEN. ON 2L PER NC. SMALL HEALING SCABS SEEN
TO BILATERAL LOWER EXTREMITES. CALL LIGHT IN PLACE.

## 2020-05-16 NOTE — NUR
UNABLE TO COMPLETE ASSESSMENTS. CONFUSED TO PERSON, TIME AND SITUATION. KNOWS
SHE IS IN HOSPITAL. REPORTED RECENTLY DISCHARGED ON 5/12/20. WHEN ASKED HER
FIRST AND LAST NAME SHE STARTS SPELLING FIRST NAME. STATES "I DON'T KNOW WHEN
ASKED THE YEAR AND MUMBLES WHEN ASKED WHY SHE IS HERE. INCONT OF URINE WHEN
TRANSFERED FROM [facility]. INSISTED ON GOING TO B/R LATER ON. UNSTEADY ON HER
FEET. DID USE TOILET TO URINATE. BED ALARM IN PLACE FOR SAFTY. SCD,S IN PLACE.
SCABED AREA TO KRISTINE ARMS AND LEFT LEG. DSG TO LT BREAST REMOVED AND PACKING
WAS DRY. AREA CLEANSED WITH WOUND CLEANSED. WET TO DRY DSG PLACED USING NS FOR
PACKING. SLOUGH ON ISIDE OF WOUND. DSG NOT DATED OR SIGNED. IV TO LT ARM WITH
NS INFUSING AT 125CC/HR. NO REDNESS OR SWELLING TO SITE. DSG INTACT. NO S/S OF
DISTRESS OBSERVED. UNABLE TO ANSWER QUESTIONS AT THIS TIME.

## 2020-05-16 NOTE — NUR
PT REQUESTING WATER, EDP INFORMED, STATES SHE MAY HAVE IF SHE CAN TOLERATE IT.
PT GIVEN ICE WATER, TOLERATED WELL.

## 2020-05-16 NOTE — NUR
RECEIVED BEDSIDE SHIFT REPORT. UP IN BED WITH EYES OPEN. ALERT AND CONT TO BE
CONFUSED. SPEECH MUCH CLEARER. O2@ 2 LITERS PER N/C. IV TO LT AC WITH NS AT
125CC/HR. UMBILICAL HERNIA AND SCABS TO ARMS AND RT LEG. SCD'S OFF AT THIS
TIME. BEDSIDE COMMODE IN PLACE. WILL CONT. POC.

## 2020-05-16 NOTE — HP
PATIENT: MIREYA BENOIT                              MEDICAL RECORD: [id number]
ACCOUNT: [id number]                                    LOCATION:[address]
: 64                                            ADMISSION DATE: 20
                                                         PCP: JOAQUINA BARFIELD
 
                             HISTORY AND PHYSICAL EXAMINATION
 
 
DATE OF ADMISSION:  2020
 
CHIEF COMPLAINT:  Confusion, high sugar.
 
HISTORY OF PRESENT ILLNESS:  This is a 59-year-old female followed by Dr. Barfield in our clinic.  She has a history of uncontrolled diabetes, obesity,
hypertension.  She was just admitted for over a week here with a breast abscess
that required I&D by surgery.  She was discharged home on 2020 where she
lives with her daughter.  She was brought to the ER, the evening of 05/15/2020,
daughter had noticed that she had decreased p.o. intake during the day, then
started refusing medicines, then became confused.  The story is that when this
happens her kidneys are usually "shutting down."  Actually, in the ER, her
creatinine was 1.7, which is really pretty good for her.  It is noted that when
she was in the hospital with her breast abscess, her kidney function actually
was as low as 2.0 and as high as 3.9, so the 1.7 is actually pretty good.  Her
magnesium was down to 1.1.  Her glucose was high at 379.  White count was a
little high.  CAT scan of her head did not show any acute abnormalities.  She is
admitted with uncontrolled diabetes, hypomagnesemia, metabolic encephalopathy.
 
PAST MEDICAL AND SURGICAL HISTORY:  Gained from her chart and speaking to her
son, Ran, on the phone shows she has diabetes (last A1c in the office was 12.7
in March), morbid obesity, hypertension, hyperlipidemia, osteoarthritis,
obstructive sleep apnea and depression.
 
PAST SURGICAL HISTORY:  Hysterectomy, cholecystectomy, right total knee
arthroplasty, coronary artery stents, toe amputation, bilateral carpal tunnel
release, trigger finger release,  section, anterior cervical fusion.
 
HABITS:  Former smoker, no alcohol or drugs.
 
ALLERGIES:  No known drug allergies.
 
MEDICATIONS:  (These were gained from the discharge orders on 2020)
Augmentin 875 b.i.d., olanzapine 5 mg once a day, clonazepam 1 mg twice a day,
atorvastatin 20 mg once a day, gabapentin 600 mg t.i.d., lisinopril
10/hydrochlorothiazide 12.5 once a day, aspirin 81 mg a day, citalopram 40 mg a
day, carvedilol 3.125 mg twice a day, Jardiance 10 mg once a day (when I looked
up her medications on the PBM through our office, she was on venlafaxine instead
of citalopram, she was on Januvia instead of Jardiance, she was on allopurinol,
but that has been stopped).  Other active medication is Lantus 60 units every
morning.
 
FAMILY HISTORY:  Unknown.
 
SOCIAL HISTORY:  She is disabled, lives with her daughter.
 
REVIEW OF SYSTEMS:  Unable to obtain because the patient is confused.
 
PHYSICAL EXAMINATION:
VITAL SIGNS:  Today, temperature 98.1, pulse 78, respirations 20, blood pressure
 
 
 
HISTORY AND PHYSICAL                           [id number]    MIREYA BENOIT      
 
 
182/87, O2 sats 97%.
GENERAL:  The patient is awake.  She appears lethargic.  She will look at me. 
She will follow commands.  When I asked where she is, does she know where she
is, she states yes, but she would not tell me that she is in the hospital.
SKIN:  Warm and dry.
HEENT:  Grossly within normal limits.
NECK:  Supple.
HEART:  Regular rate and rhythm without murmur.
LUNGS:  Clear.
ABDOMEN:  Soft, obese.
EXTREMITIES:  No edema.
NEUROLOGIC:  Again, lethargic, confused.  She is not oriented to time or place.
 
LABORATORY AND DIAGNOSTIC DATA:  Urinalysis is okay.  Urine drug screen is
positive for opiates (patient is on hydrocodone 10 four times a day).  Ammonia
level was 4.  CBC with a white count of 14,100, hemoglobin 9.9, hematocrit 30.7.
 Basic metabolic panel; sodium 137, potassium 4.0, chloride 100, CO2 21.8, BUN
30, creatinine 1.7, glucose 345, calcium 8.4.  Liver functions are all normal. 
Her ammonia level is 4.  Magnesium level was 1.1 down in the ER, troponin was
less than 0.017 in the ER, but this morning her troponin is elevated at 0.120. 
TSH a little elevated at 4.91.  INR 1.11.  CT of the head was done in the ER
showing no acute process.  Chest x-ray showing no acute process.
 
ASSESSMENT:
1.  Metabolic encephalopathy.
2.  Uncontrolled diabetes.
3.  Hypomagnesemia.
4.  Chronic kidney disease.
5.  Morbid obesity.
 
PLAN:  Cardiology has been consulted for her elevated troponin.  We will give
her fluids.  We will monitor her sugar and put her on sliding scale, replace
magnesium.  She is on an electrolyte protocol.  Other tests or procedures as
warranted.
 
TRANSINT:[id number] Voice Confirmation ID: [id number] DOCUMENT ID: [id number]
 
 
                                           
                                           YECENIA IQBAL MD              
 
 
 
Electronically Signed by YECENIA IQBAL on 20 at 1826
 
 
 
CC:                                                             [id number]
DICTATION DATE: 20 1151     :     20 1317      ADM IN  
                                                                              
[facility]                                          
[address] [id number]

## 2020-05-17 VITALS — DIASTOLIC BLOOD PRESSURE: 88 MMHG | SYSTOLIC BLOOD PRESSURE: 187 MMHG

## 2020-05-17 VITALS — SYSTOLIC BLOOD PRESSURE: 183 MMHG | DIASTOLIC BLOOD PRESSURE: 66 MMHG

## 2020-05-17 VITALS — SYSTOLIC BLOOD PRESSURE: 177 MMHG | DIASTOLIC BLOOD PRESSURE: 84 MMHG

## 2020-05-17 VITALS — SYSTOLIC BLOOD PRESSURE: 161 MMHG | DIASTOLIC BLOOD PRESSURE: 74 MMHG

## 2020-05-17 VITALS — SYSTOLIC BLOOD PRESSURE: 163 MMHG | DIASTOLIC BLOOD PRESSURE: 75 MMHG

## 2020-05-17 VITALS — DIASTOLIC BLOOD PRESSURE: 68 MMHG | SYSTOLIC BLOOD PRESSURE: 157 MMHG

## 2020-05-17 LAB
ALT SERPL-CCNC: 20 U/L (ref 10–68)
ANION GAP SERPL CALC-SCNC: 14.2 MMOL/L (ref 8–16)
BASOPHILS NFR BLD AUTO: 0.1 % (ref 0–2)
BUN SERPL-MCNC: 40 MG/DL (ref 7–18)
CALCIUM SERPL-MCNC: 7.3 MG/DL (ref 8.5–10.1)
CHLORIDE SERPL-SCNC: 103 MMOL/L (ref 98–107)
CHOLEST/HDLC SERPL: 5.2 RATIO (ref 2.3–4.1)
CO2 SERPL-SCNC: 24.4 MMOL/L (ref 21–32)
CREAT SERPL-MCNC: 1.8 MG/DL (ref 0.6–1.3)
EOSINOPHIL NFR BLD: 0.1 % (ref 0–7)
ERYTHROCYTE [DISTWIDTH] IN BLOOD BY AUTOMATED COUNT: 14.5 % (ref 11.5–14.5)
GLUCOSE SERPL-MCNC: 289 MG/DL (ref 74–106)
HCT VFR BLD CALC: 27.6 % (ref 36–48)
HDLC SERPL-MCNC: 38 MG/DL (ref 32–96)
HGB BLD-MCNC: 8.9 G/DL (ref 12–16)
IMM GRANULOCYTES NFR BLD: 0.5 % (ref 0–5)
LDL-HDL RATIO: 3.4 RATIO (ref 1.5–3.5)
LDLC SERPL-MCNC: 130 MG/DL (ref 0–100)
LYMPHOCYTES NFR BLD AUTO: 14.7 % (ref 15–50)
MAGNESIUM SERPL-MCNC: 1.9 MG/DL (ref 1.8–2.4)
MCH RBC QN AUTO: 29.1 PG (ref 26–34)
MCHC RBC AUTO-ENTMCNC: 32.2 G/DL (ref 31–37)
MCV RBC: 90.2 FL (ref 80–100)
MONOCYTES NFR BLD: 6.6 % (ref 2–11)
NEUTROPHILS NFR BLD AUTO: 78 % (ref 40–80)
OSMOLALITY SERPL CALC.SUM OF ELEC: 296 MOSM/KG (ref 275–300)
PLATELET # BLD: 224 10X3/UL (ref 130–400)
PMV BLD AUTO: 8.8 FL (ref 7.4–10.4)
POTASSIUM SERPL-SCNC: 3.6 MMOL/L (ref 3.5–5.1)
RBC # BLD AUTO: 3.06 10X6/UL (ref 4–5.4)
SODIUM SERPL-SCNC: 138 MMOL/L (ref 136–145)
TRIGL SERPL-MCNC: 146 MG/DL (ref 30–200)
WBC # BLD AUTO: 15.5 10X3/UL (ref 4.8–10.8)

## 2020-05-17 NOTE — NUR
ALERT AND ORIENTED X4. SITTING UP IN BED WATCHING TV. CONSENTS FOR CATH LAB
SIGNED ON CHART. DENIES ANY NEEDS AT THIS TIME. CONTINUE PLAN OF CARE AND
SAFETY PRECAUTIONS.

## 2020-05-17 NOTE — NUR
RECIEVED UP IN BED WITH EYES OPEN AND TV ON. ALERT AND ORIETNED AT THIS TIME.
UNABLE TO REMEMBER WHY SHE IS HERE. IV TO LT AC WITH NS /HR. TELEMETRY
IN PLACE. DSG TO LT BREAST. WILL CHANGE THIS SHIFT. RT GREAT TOE NECROTIC AREA
AND LT SECOND TO PATIAL AMP..WHITE COLOR SCAB TO LT HAND MIDDLE FINGER RED AND
SWOLLEN. EDUCATED ON NPO STATUS AFTER MN AND CATH SCHEDULED. DENIES ANY NEEDS
AT THIS TIME.

## 2020-05-17 NOTE — NUR
RECIEVE REPORT. RESTING IN BED WITH EYES CLOSED. NO SIGNS OF DISTRESS.
CONTINUE PLAN OF CARE AND SAFETY PRECAUTIONS.

## 2020-05-18 VITALS — DIASTOLIC BLOOD PRESSURE: 73 MMHG | SYSTOLIC BLOOD PRESSURE: 157 MMHG

## 2020-05-18 VITALS — DIASTOLIC BLOOD PRESSURE: 73 MMHG | SYSTOLIC BLOOD PRESSURE: 178 MMHG

## 2020-05-18 VITALS — SYSTOLIC BLOOD PRESSURE: 154 MMHG | DIASTOLIC BLOOD PRESSURE: 83 MMHG

## 2020-05-18 VITALS — SYSTOLIC BLOOD PRESSURE: 161 MMHG | DIASTOLIC BLOOD PRESSURE: 51 MMHG

## 2020-05-18 VITALS — DIASTOLIC BLOOD PRESSURE: 72 MMHG | SYSTOLIC BLOOD PRESSURE: 191 MMHG

## 2020-05-18 VITALS — DIASTOLIC BLOOD PRESSURE: 86 MMHG | SYSTOLIC BLOOD PRESSURE: 162 MMHG

## 2020-05-18 LAB
ANION GAP SERPL CALC-SCNC: 15.2 MMOL/L (ref 8–16)
BASOPHILS NFR BLD AUTO: 0.2 % (ref 0–2)
BUN SERPL-MCNC: 35 MG/DL (ref 7–18)
CALCIUM SERPL-MCNC: 7.4 MG/DL (ref 8.5–10.1)
CHLORIDE SERPL-SCNC: 109 MMOL/L (ref 98–107)
CO2 SERPL-SCNC: 21.3 MMOL/L (ref 21–32)
CREAT SERPL-MCNC: 1.4 MG/DL (ref 0.6–1.3)
EOSINOPHIL NFR BLD: 1.3 % (ref 0–7)
ERYTHROCYTE [DISTWIDTH] IN BLOOD BY AUTOMATED COUNT: 14.6 % (ref 11.5–14.5)
GLUCOSE SERPL-MCNC: 160 MG/DL (ref 74–106)
HCT VFR BLD CALC: 30.9 % (ref 36–48)
HGB BLD-MCNC: 9.6 G/DL (ref 12–16)
IMM GRANULOCYTES NFR BLD: 0.3 % (ref 0–5)
LYMPHOCYTES NFR BLD AUTO: 20.3 % (ref 15–50)
MCH RBC QN AUTO: 28.7 PG (ref 26–34)
MCHC RBC AUTO-ENTMCNC: 31.1 G/DL (ref 31–37)
MCV RBC: 92.5 FL (ref 80–100)
MONOCYTES NFR BLD: 7.6 % (ref 2–11)
NEUTROPHILS NFR BLD AUTO: 70.3 % (ref 40–80)
OSMOLALITY SERPL CALC.SUM OF ELEC: 293 MOSM/KG (ref 275–300)
PLATELET # BLD: 210 10X3/UL (ref 130–400)
PMV BLD AUTO: 9.1 FL (ref 7.4–10.4)
POTASSIUM SERPL-SCNC: 3.5 MMOL/L (ref 3.5–5.1)
RBC # BLD AUTO: 3.34 10X6/UL (ref 4–5.4)
SODIUM SERPL-SCNC: 142 MMOL/L (ref 136–145)
WBC # BLD AUTO: 12 10X3/UL (ref 4.8–10.8)

## 2020-05-18 PROCEDURE — B2151ZZ FLUOROSCOPY OF LEFT HEART USING LOW OSMOLAR CONTRAST: ICD-10-PCS | Performed by: INTERNAL MEDICINE

## 2020-05-18 PROCEDURE — 4A023N7 MEASUREMENT OF CARDIAC SAMPLING AND PRESSURE, LEFT HEART, PERCUTANEOUS APPROACH: ICD-10-PCS | Performed by: INTERNAL MEDICINE

## 2020-05-18 PROCEDURE — B2111ZZ FLUOROSCOPY OF MULTIPLE CORONARY ARTERIES USING LOW OSMOLAR CONTRAST: ICD-10-PCS | Performed by: INTERNAL MEDICINE

## 2020-05-18 NOTE — EC
PATIENT:MIREYA BENOIT              DATE OF SERVICE: 05/16/20
SEX: F                                  MEDICAL RECORD: [id number]
DATE OF BIRTH: [birth year]                        LOCATION:D.M2      D.210
AGE OF PATIENT: 59                             ADMISSION DATE: 05/16/20
 
REFERRING PHYSICIAN:                               
 
INTERPRETING PHYSICIAN: WILD STEVEN MD          
 
 
 
                             ECHOCARDIOGRAM REPORT
  ECHO CHARGES 4               ECHO COMPLETE                 Date: 05/15/20
 
 
 
CLINICAL DIAGNOSIS: NSTEMI                        
 
                         ECHOCARDIOGRAPHIC MEASUREMENTS
      (adult normal given)
   AC root (d.<3.7cm) 2.4  cm   LV Septum d (<1.2 cm> 1.3  cm
      Valve Excursion 1.0  cm     LV Septum (systole) 1.8  cm
Left Atria (s.<4.0cm> 3.8  cm          LVPW d(<1.2cm) 0.9  cm
        RV (d.<2.3cm) 2.7  cm           LVPW (sytole) 1.0  cm
  LV diastole(<5.6CM) 5.6  cm       MV E-F(>70mm/sec)      cm
           LV systole 4.2  cm           LVOT Diameter 2.0  cm
       MV exc.(>10mm)      cm
Est.ejection fraction (50-75%)     %
 
   DOPPLER:
     LVIT      cm/sec A 131  cm/sec E 121   cm/sec
       LA      cm/sec      RVSP 24.8 mmHg
     LVOT 128  cm/sec   AOP1/2T      m/s
  Asc. Ao 213  cm/sec
     RVOT 95   cm/sec
       RA      cm/sec
       PA 97   cm/sec
 AV Gradient Peak 18.1 mmHg  AV Mean 9.2  mmHg  AV Area 2.0  cm
 MV Gradient Peak 7.1  mmHg  MV Mean 4.3  mmHg  MV Area      cm
   COMMENTS:                                              
 
 
 Cardiac Sonographer: Lynn REYES             
      Cardiologist: 3          Dr. Lott             
             TAPE# PACS           
                                       Pericardial Effusion N                        
 
 
DATE OF SERVICE:  
 
Adequate 2D, color flow imaging, spectral Doppler, and M-Mode.
 
Borderline LVH.  LV internal dimensions appears normal.  LV function appears to
be lower limits of normal, mildly reduced.  Estimated EF 45% to 50%.  Aortic
valve is tricuspid.  No evidence of stenosis by Doppler interrogation.  Left
atrium is normal at 3.8 cm.  Mitral valve shows no prolapse.  Trace MR. 
Right-sided chambers are grossly normal.  Trace TR.
 
 
 
 
ECHOCARDIOGRAM REPORT                          [id number]    MIREYA BENOIT      
 
 
TRANSINT:[id number] Voice Confirmation ID: [id number] DOCUMENT ID: [id number]
                                           
                                           WILD STEVEN MD          
 
 
 
Electronically Signed by WILD STEVEN on 05/18/20 at 0858
 
 
 
 
 
 
 
 
 
 
 
 
 
 
 
 
 
 
 
 
 
 
 
 
 
 
 
 
 
 
 
 
 
 
 
 
 
 
 
CC:                                                             [id number]
DICTATION DATE: 05/17/20 1016     :     05/17/20 1358      ADM IN  
                                                                              
[facility]                                          
[address] [place] [address]

## 2020-05-18 NOTE — NUR
SURGICAL INCISION LEFT BREAST.  CURRENT TX IS WET TO DRY DRESSINGS WITH
SALINE.
WOUND CARE CONTINUES MONITORING.

## 2020-05-18 NOTE — NUR
ALERT AND ORIENTED X4. SITTING UP IN BED. RT WRIST FREE FROM BLEEDING. FREE
FROM HEMATOME. DRESSING C/D/I. SINUS ELIUD ON TELEMETRY. DENIES ANY NEEDS AT
THIS TIME. CONTINUE PLAN OF CARE AND SAFETY PRECAUTIONS.

## 2020-05-18 NOTE — NUR
ASSESSMENT COMPLETE, PT A&O.  RESPERATIONS EVEN ON RA.  IV TO LEFT AC SL, SITE
CLEAN AND DRY, NO SWELLING OR BLEEDING NOTED.  DRSG TO RIGHT WRIST FROM HEART
CATH, C/D/I.  NO SWELLING, BLEEDING OR HEMATOMA NOTED.   SPLINT STILL IN PLACE
TO HELP REMIND PT NOT TO BEND WRIST.   DRSG TO LEFT BREAST C/D/I.  NO DRAINAGE
NOTED.   PT CURRENTLY DENIES PAIN.  UP WITH ASSIST TO BR.

## 2020-05-18 NOTE — NUR
RETURN TO ROOM VIA BED FROM CATH LAB. ALERT AND ORIENTED X4. ZYTHROBAND RT
WRIST CLEAN DRY INTACT. FREE FROM BLEEDING. FREE FROM HEMATOMA. BP-162/83,
HR-54 SINUS ELIUD, O2-98% 2L NC. CLEAN CATH. ENCOURAGE TO REMAIN IN BED FOR
NEXT 2 HOURS. CONTINUE PLAN OF CARE AND SAFETY PRECAUTIONS.

## 2020-05-18 NOTE — NUR
HS MEDS GIVEN  WITH FRESH ICE WATER.  , COVERED PER S/S.  PT DENIES PAIN
OR NEEDS.  HS SNACK PROVIDED.

## 2020-05-18 NOTE — CN
PATIENT NAME:MIREYA BENOIT                            MEDICAL RECORD: [id number]
: 64                                              LOCATION:D.M2 D.2109
ADMIT DATE: 20                                       ACCOUNT: [id number]
CONSULTING PHYSICIAN:    WILD STEVEN MD          
                                               
REFERRING PHYSICIAN:     YECENIA IQBAL MD              
 
 
DATE OF CONSULTATION:  2020
 
HISTORY OF PRESENT ILLNESS:  A 59-year-old female with a history of coronary
artery disease, status post intervention, most recently had LAD with Dr. Horowitz
in 2017, was recently discharged from the hospital with breast abscess, admitted
with confusion, generalized malaise, found to have a marked electrolyte
abnormalities as well as hyperglycemia, had an increase in troponin consistent
with NSTEMI, decreased troponin despite the improvement in BUN and creatinine
with IV hydration.  We are asked to see her concerning her cardiovascular
status.
 
PAST MEDICAL HISTORY:  Includes;
1.  History of diabetes mellitus.
2.  Hypertension.
3.  Hyperlipidemia.
4.  Recent breast abscess.
5.  Coronary artery disease as described above.
6.  Diabetic neuropathy.
 
MEDICATIONS:  Include insulin Jardiance 10 mg p.o. every day, Klonopin 1 mg p.o.
b.i.d., Neurontin 600 p.o. t.i.d., Norco 10/325 every 6 hours p.r.n., Zyprexa 5
mg p.o. at bedtime, Celexa 40 mg p.o. at bedtime, aspirin 81 every day,
atorvastatin 20 every day, carvedilol 3.125 every day, lisinopril 10/12.5 every
day.
 
SOCIAL HISTORY:  Lives with daughter.  Nonsmoker, nondrinker.  Typically able to
care of all her ADLs up until current illness.
 
PHYSICAL EXAMINATION:
GENERAL:  Pleasant female in no acute distress, appears stated age.  Answers
questions, but somewhat slowly.
VITAL SIGNS:  Blood pressure 141/60, pulse is 70 and regular.
HEENT:  Normocephalic, atraumatic.
NECK:  No JVD or bruit.
HEART:  Regular.  Questionable S4 gallop.
LUNGS:  Good air excursion.
ABDOMEN:  Soft, nontender.
EXTREMITIES:  Pulses 2+.  No edema.
 
IMPRESSION:  Non-ST segment elevation myocardial infarction with elevation in
troponins despite improvement in creatinine.  At this point in time, we will
continue IV hydration, correction of underlying electrolyte abnormalities.  We
will plan for diagnostic angiography.  Further recommendations based on the
above.
 
TRANSINT:[id number] Voice Confirmation ID: [id number] DOCUMENT ID: [id number]
 
 
 
CONSULT REPORT                                 [id number]    MIREYA BENOIT GREGORY A MD          
 
 
 
Electronically Signed by WILD STEVEN on 20 at 0858
 
 
 
 
 
 
 
 
 
 
 
 
 
 
 
 
 
 
 
 
 
 
 
 
 
 
 
 
 
 
 
 
 
 
 
 
 
 
 
 
 
CC:                                                             [id number]
DICTATION DATE: 20 1218     :     20 1725      ADM IN  
                                                                              
[address] [place] [address]

## 2020-05-18 NOTE — NUR
RECIEVE REPORT. RESTING IN BED WITH EYES CLOSED. NO SIGNS OF DISTRESS. SINUS
RYTHM ON TELEMETRY. CONTINUE PLAN OF CARE NAD SAFETY PRECAUTIONS.

## 2020-05-19 VITALS — SYSTOLIC BLOOD PRESSURE: 189 MMHG | DIASTOLIC BLOOD PRESSURE: 77 MMHG

## 2020-05-19 VITALS — DIASTOLIC BLOOD PRESSURE: 78 MMHG | SYSTOLIC BLOOD PRESSURE: 153 MMHG

## 2020-05-19 VITALS — DIASTOLIC BLOOD PRESSURE: 90 MMHG | SYSTOLIC BLOOD PRESSURE: 138 MMHG

## 2020-05-19 VITALS — SYSTOLIC BLOOD PRESSURE: 174 MMHG | DIASTOLIC BLOOD PRESSURE: 75 MMHG

## 2020-05-19 VITALS — SYSTOLIC BLOOD PRESSURE: 193 MMHG | DIASTOLIC BLOOD PRESSURE: 75 MMHG

## 2020-05-19 VITALS — DIASTOLIC BLOOD PRESSURE: 76 MMHG | SYSTOLIC BLOOD PRESSURE: 198 MMHG

## 2020-05-19 VITALS — SYSTOLIC BLOOD PRESSURE: 179 MMHG | DIASTOLIC BLOOD PRESSURE: 56 MMHG

## 2020-05-19 LAB
ANION GAP SERPL CALC-SCNC: 13.4 MMOL/L (ref 8–16)
BASOPHILS NFR BLD AUTO: 0.3 % (ref 0–2)
BUN SERPL-MCNC: 29 MG/DL (ref 7–18)
CALCIUM SERPL-MCNC: 7.9 MG/DL (ref 8.5–10.1)
CHLORIDE SERPL-SCNC: 109 MMOL/L (ref 98–107)
CO2 SERPL-SCNC: 24.2 MMOL/L (ref 21–32)
CREAT SERPL-MCNC: 1.5 MG/DL (ref 0.6–1.3)
EOSINOPHIL NFR BLD: 0.7 % (ref 0–7)
ERYTHROCYTE [DISTWIDTH] IN BLOOD BY AUTOMATED COUNT: 14.6 % (ref 11.5–14.5)
GLUCOSE SERPL-MCNC: 189 MG/DL (ref 74–106)
HCT VFR BLD CALC: 29 % (ref 36–48)
HGB BLD-MCNC: 9.2 G/DL (ref 12–16)
IMM GRANULOCYTES NFR BLD: 0.3 % (ref 0–5)
LYMPHOCYTES NFR BLD AUTO: 21 % (ref 15–50)
MCH RBC QN AUTO: 29.1 PG (ref 26–34)
MCHC RBC AUTO-ENTMCNC: 31.7 G/DL (ref 31–37)
MCV RBC: 91.8 FL (ref 80–100)
MONOCYTES NFR BLD: 7.2 % (ref 2–11)
NEUTROPHILS NFR BLD AUTO: 70.5 % (ref 40–80)
OSMOLALITY SERPL CALC.SUM OF ELEC: 295 MOSM/KG (ref 275–300)
PLATELET # BLD: 217 10X3/UL (ref 130–400)
PMV BLD AUTO: 9.2 FL (ref 7.4–10.4)
POTASSIUM SERPL-SCNC: 3.6 MMOL/L (ref 3.5–5.1)
RBC # BLD AUTO: 3.16 10X6/UL (ref 4–5.4)
SODIUM SERPL-SCNC: 143 MMOL/L (ref 136–145)
WBC # BLD AUTO: 9.7 10X3/UL (ref 4.8–10.8)

## 2020-05-19 NOTE — NUR
Nutrition Follow-up:
Poor appetite. Breakfast tray appeared mostly untouched. C/o slight nausea
without vomiting. Agreed to try Glucerna.
Diet: Diabetic
PO intake: 0-20%
Wt: 288# (5/19); 286# (5/16)
Last BM: possibly yesterday per pt
Labs noted: Glu 189, Ca 7.9
Meds noted: Lantus, Humalog, HCTZ, Protonix, electrolyte protocol
-Encourage PO intake and honor food preferences within diet restrictions.
-Glucerna sent with lunch today for pt trial.
-Monitor wt; noted daily wts ordered.
-RD following.

## 2020-05-19 NOTE — NUR
RECIEVE REPORT. RESTING IN BED WITH EYES CLOSED. SINUS ELIUD 59 ON TELEMETRY.
RT WRIST IN IMMOBILIZER. NO SIGNS OF DISTRESS. CONTINUE PLAN OF CARE AND
SAFETY PRECAUTIONS.

## 2020-05-19 NOTE — NUR
REPORT RECEIVED, WILL CONTINUE POC. PATIENT IS AAOX4, LYING IN SEMI-FOWLERS
POSITION. NO S/S OF DISTRESS OBSERVED, RR EVEN AND UNLABORED ON ROOM AIR. PIV
TO LT AC SL, DRSG C/D/I. DRSG UNDERNEATH LT BREAST C/D/I. PATIENT DENIES NEEDS
AT THIS TIME. CL IN REACH, BED LOCKED AND LOWERED. WILL CTM.

## 2020-05-19 NOTE — NUR
ALERT AND ORIENTED X4. SITTING UP IN BED. REPORTED BP-193/75. MANUALLY CHECK
BP-182/90. REPORT BP TO .

## 2020-05-20 VITALS — SYSTOLIC BLOOD PRESSURE: 160 MMHG | DIASTOLIC BLOOD PRESSURE: 58 MMHG

## 2020-05-20 VITALS — SYSTOLIC BLOOD PRESSURE: 172 MMHG | DIASTOLIC BLOOD PRESSURE: 65 MMHG

## 2020-05-20 VITALS — SYSTOLIC BLOOD PRESSURE: 189 MMHG | DIASTOLIC BLOOD PRESSURE: 66 MMHG

## 2020-05-20 VITALS — DIASTOLIC BLOOD PRESSURE: 82 MMHG | SYSTOLIC BLOOD PRESSURE: 196 MMHG

## 2020-05-20 VITALS — DIASTOLIC BLOOD PRESSURE: 81 MMHG | SYSTOLIC BLOOD PRESSURE: 123 MMHG

## 2020-05-20 LAB
ANION GAP SERPL CALC-SCNC: 12 MMOL/L (ref 8–16)
BASOPHILS NFR BLD AUTO: 0.4 % (ref 0–2)
BUN SERPL-MCNC: 26 MG/DL (ref 7–18)
CALCIUM SERPL-MCNC: 7.9 MG/DL (ref 8.5–10.1)
CHLORIDE SERPL-SCNC: 109 MMOL/L (ref 98–107)
CO2 SERPL-SCNC: 25 MMOL/L (ref 21–32)
CREAT SERPL-MCNC: 1.4 MG/DL (ref 0.6–1.3)
EOSINOPHIL NFR BLD: 0.6 % (ref 0–7)
ERYTHROCYTE [DISTWIDTH] IN BLOOD BY AUTOMATED COUNT: 14.8 % (ref 11.5–14.5)
GLUCOSE SERPL-MCNC: 91 MG/DL (ref 74–106)
HCT VFR BLD CALC: 31.4 % (ref 36–48)
HGB BLD-MCNC: 10 G/DL (ref 12–16)
IMM GRANULOCYTES NFR BLD: 0.3 % (ref 0–5)
LYMPHOCYTES NFR BLD AUTO: 17.6 % (ref 15–50)
MCH RBC QN AUTO: 29.2 PG (ref 26–34)
MCHC RBC AUTO-ENTMCNC: 31.8 G/DL (ref 31–37)
MCV RBC: 91.8 FL (ref 80–100)
MONOCYTES NFR BLD: 9.4 % (ref 2–11)
NEUTROPHILS NFR BLD AUTO: 71.7 % (ref 40–80)
OSMOLALITY SERPL CALC.SUM OF ELEC: 289 MOSM/KG (ref 275–300)
PLATELET # BLD: 206 10X3/UL (ref 130–400)
PMV BLD AUTO: 9.4 FL (ref 7.4–10.4)
POTASSIUM SERPL-SCNC: 3 MMOL/L (ref 3.5–5.1)
RBC # BLD AUTO: 3.42 10X6/UL (ref 4–5.4)
SODIUM SERPL-SCNC: 143 MMOL/L (ref 136–145)
WBC # BLD AUTO: 10.2 10X3/UL (ref 4.8–10.8)

## 2020-05-20 NOTE — NUR
PATIENT SITTING UP IN BED TALKING ON PHONE. PATIENT DENIES ANY NEEDS OR PAIN.
ASSESMENT COMPLETED. WILL CONTINUE WITH PLAN OF CARE. SR UP X 2 BED IN LOW
POSITION AND CALL LIGHT IN REACH.

## 2020-05-20 NOTE — MORECARE
CASE MANAGEMENT DISCHARGE SUMMARY
 
 
PATIENT: MIREYA BENOIT                  UNIT: [id number]
ACCOUNT#: [id number]                       ADM DATE: 20
AGE: 59     : [birth year]  SEX: F            ROOM/BED: D.2104    
AUTHOR: TEO FIELDS                             PHYSICIAN:                               
 
REFERRING PHYSICIAN: JOAQUINA BARFIELD MD    
DATE OF SERVICE: 20
Discharge Plan
 
 
Patient Name: MIREYA BENOIT
Facility: [facility]:[place]
Encounter #: [id number]
Medical Record #: [id number]
: 1960
Planned Disposition: Home
Anticipated Discharge Date: 20
 
Discharge Date: 
Expected LOS: 4
Initial Reviewer: [email or web address]
Initial Review Date: 2020
Generated: 20   5:16 pm 
 DCPIA - Discharge Planning Initial Assessment
 
Updated by [id number]: Emily Whiting on 20   4:15 pm
*  Is the patient Alert and Oriented?
Yes
*  How many steps to enter\exit or inside your home? 8-10/0 *  PCP Dr. Barfield * 
Pharmacy
[facility] [address]
*  Preadmission Environment
Home with Family
*  ADLs
Independent
*  Equipment
Cane
Glucometer
Shower Chair
Walker
*  List name and contact numbers for known caregivers / representatives who 
currently or will assist patient after discharge:
Franci Benoit [facility] - [phone]  Kelly Ryanmery  [phone]
 
*  Verbal permission to speak to the caregivers and representatives has been 
obtained from the patient.
Yes
 
*  Community resources currently utilized
None
*  Additional services required to return to the preadmission environment?
No
*  Can the patient safely return to the preadmission environment?
Yes
*  Has this patient been hospitalized within the prior 30 days at any 
hospital?
Yes
 
 
 
 
 
 
Patient Name: MIREYA BENOIT
Encounter #: [id number]
Page [id number]
 
 
 
 
 
Electronically Signed by TEO FIELDS on 20 at 1616
 
 
 
 
 
 
**All edits/amendments must be made on the electronic document**
 
DICTATION DATE: 20     : ROSALES  20     
RPT#: [id number]                                DC DATE:        
                                               STATUS: ADM IN  
[facility]
 [address] [id number]
***END OF REPORT***

## 2020-05-20 NOTE — NUR
PATIENT LAYING IN BED SON AT BS. ANSWERED QUESTIONS TO PT AND SON
SATISFACTION. PATIENT DENIES ANY NEEDS OR  PAIN. ASESSMENT COMPLETED. WILL
CONTINUE TO MONITOR. SR UPX 2 BED IN LOW POSITION AND CALL LIGHT IN REACH.

## 2020-05-20 NOTE — MORECARE
CASE MANAGEMENT DISCHARGE SUMMARY
 
 
PATIENT: MIREYA BENOIT                  UNIT: [id number]
ACCOUNT#: [id number]                       ADM DATE: 20
AGE: 59     : 64  SEX: F            ROOM/BED: D.2563    
AUTHOR: DIAMONDDOC                             PHYSICIAN:                               
 
REFERRING PHYSICIAN: JOAQUINA BARFIELD MD    
DATE OF SERVICE: 20
Discharge Plan
 
 
Patient Name: MIREYA BENOIT
Facility: [facility]:[place]
Encounter #: [id number]
Medical Record #: [id number]
: 1960
Planned Disposition: Home
Anticipated Discharge Date: 20
 
Discharge Date: 
Expected LOS: 4
Initial Reviewer: [email or web address]
Initial Review Date: 2020
Generated: 20   5:26 pm 
Comments
 
DCP- Discharge Planning
 
Updated by [id number]: Emily Whiting on 20   3:20 pm CT
Patient Name: MIREYA BENOIT                                     
Admission Status: ER   
Accout number: [id number]                              
Admission Date: 2020   
: 1960                                                        
Admission Diagnosis:DISORIENTATION, UNSPECIFIED   
Attending: JOAQUINA BARFIELD                                                
Current LOS:  4   
  
Anticipated DC Date: 2020   
Planned Disposition: Home   
[facility]   
  
  
Discharge Planning Comments: CM met with patient to discuss discharge 
planning/needs. States she lives with her daughter, Franci. States Franci assists 
her with her care. She states she is independent with her care, but then 
states Franci brings her medication to her and helps with wound care. I 
discussed availability of home health, DME and rehab and she declines. I 
 
informed her that Dr. Barfield ordered home health and she states her 
daughter can help her with her care. I called [facility] and spoke with 
Nora (she was set up with home health last week according to notes) and 
Nora states that they tried several times to admit the patient, but the 
patient kept refusing for them to come out. Declination for home health 
signed by patient. She states she will have someone pick her up for discharge,
 she isn't sure whom yet. Declines needs.   
  
  
  
  
  
  
: Emily Whiting
 DCPIA - Discharge Planning Initial Assessment
 
Updated by [email or web address]: Emily Whiting on 20   4:15 pm
*  Is the patient Alert and Oriented?
Yes
*  How many steps to enter\exit or inside your home? 8-100 *  PCP Dr. Barfield * 
Pharmacy
[facility] [address]
*  Preadmission Environment
Home with Family
*  ADLs
Independent
*  Equipment
Cane
Glucometer
Shower Chair
Walker
*  List name and contact numbers for known caregivers / representatives who 
currently or will assist patient after discharge:
Franci Benoit [facility] - [phone]  Kelly Ryanmery  [phone]
 
*  Verbal permission to speak to the caregivers and representatives has been 
obtained from the patient.
Yes
*  Community resources currently utilized
None
*  Additional services required to return to the preadmission environment?
No
*  Can the patient safely return to the preadmission environment?
Yes
*  Has this patient been hospitalized within the prior 30 days at any 
hospital?
Yes
 
 
 
 
 
 
Coverage Notice
 
Reviewer: [email or web address] Whiting
 
Notice Issued Date-Time: 2020  16:20
Notice Type: IM Discharge Notice
 
Notice Delivered To: Patient
Relationship to Patient: Self
Representative Name: 
 
Delivery Method: HAND - Hand Delivered
Elba Days:
Prior Verbal Notification: 
 
Recipient Understood Notice: Yes
Recipient Signature: Yes
Med Rec Note Co-signed by Attending:
 
Coverage Notice Comment:  IMM explained, signed, given, copy placed in MR
Reviewer: [email or web address] Whiting
 
Notice Issued Date-Time: 2020  16:20
Notice Type: Patient Choice Letter
 
Notice Delivered To: Patient
Relationship to Patient: Self
Representative Name: 
 
Delivery Method: HAND - Hand Delivered
Elba Days:
Prior Verbal Notification: 
 
Recipient Understood Notice: Yes
Recipient Signature: Yes
Med Rec Note Co-signed by Attending:
 
Coverage Notice Comment:  JACKY FOR DECLINATION OF HHS SIGNED
 
Last DP export: 20   3:16 p
Patient Name: MIREYA BENOIT
 
Encounter #: [id number]
Page [id number]
 
 
 
 
 
Electronically Signed by TEO FIELDS on 20 at 1627
 
 
 
 
 
 
**All edits/amendments must be made on the electronic document**
 
DICTATION DATE: 20     : ROSALES  20     
RPT#: [id number]                                DC DATE:        
                                               STATUS: ADM IN  
[facility]
 [place] [address]
***END OF REPORT***

## 2020-05-20 NOTE — NUR
PATEINT IS STABLE AND VSS. PATIENT DENIES ANY NEEDS OR PAIN. ORDERS RECEIVED
FOR DC. WRITTEN AND VERBAL INSTRUCTIONS GIVEN . PATIENT VERBALIZED
UNDERSTANDING AND SIGNED PAPERWORK. IV DCD WITHOUT DIFFICULTY AND PRESSURE
DRSG APPLIED. PATIENT TO FRONT DOOR VIA WC TO PRIVATE VEHICLE DRIVEN BY
DAUGHTER.

## 2020-05-20 NOTE — NUR
REPORT RECEIVED FROM NIGHT SHIFT AND PATEINT CARE ASSUMED.PATIENT LAYING IN
BED ON RT SIDE WITH EYES CLOSED AND BREATHING EVENLY. WILL CONTINUE WITH PLAN
OF CARE. SR UPX 2 BED IN LOW POSITION AND CALL LIGHT IN REACH.

## 2020-05-21 ENCOUNTER — HOSPITAL ENCOUNTER (EMERGENCY)
Dept: HOSPITAL 84 - D.ER | Age: 60
LOS: 1 days | Discharge: HOME | End: 2020-05-22
Payer: COMMERCIAL

## 2020-05-21 VITALS — HEIGHT: 68 IN | WEIGHT: 293 LBS | BODY MASS INDEX: 44.41 KG/M2

## 2020-05-21 DIAGNOSIS — E87.6: ICD-10-CM

## 2020-05-21 DIAGNOSIS — E11.9: ICD-10-CM

## 2020-05-21 DIAGNOSIS — G93.41: Primary | ICD-10-CM

## 2020-05-21 DIAGNOSIS — R11.0: ICD-10-CM

## 2020-05-21 DIAGNOSIS — Z79.4: ICD-10-CM

## 2020-05-21 LAB
ALBUMIN SERPL-MCNC: 2.7 G/DL (ref 3.4–5)
ALP SERPL-CCNC: 122 U/L (ref 30–120)
ALT SERPL-CCNC: 20 U/L (ref 10–68)
AMYLASE SERPL-CCNC: 25 U/L (ref 25–115)
ANION GAP SERPL CALC-SCNC: 9.6 MMOL/L (ref 8–16)
BACTERIA #/AREA URNS HPF: (no result) /HPF
BASOPHILS NFR BLD AUTO: 0.2 % (ref 0–2)
BILIRUB SERPL-MCNC: 0.76 MG/DL (ref 0.2–1.3)
BILIRUB SERPL-MCNC: NEGATIVE MG/DL
BUN SERPL-MCNC: 22 MG/DL (ref 7–18)
CALCIUM SERPL-MCNC: 8.6 MG/DL (ref 8.5–10.1)
CHLORIDE SERPL-SCNC: 106 MMOL/L (ref 98–107)
CO2 SERPL-SCNC: 28.6 MMOL/L (ref 21–32)
CREAT SERPL-MCNC: 1.5 MG/DL (ref 0.6–1.3)
EOSINOPHIL NFR BLD: 0.7 % (ref 0–7)
ERYTHROCYTE [DISTWIDTH] IN BLOOD BY AUTOMATED COUNT: 15 % (ref 11.5–14.5)
GLOBULIN SER-MCNC: 3.9 G/L
GLUCOSE SERPL-MCNC: 160 MG/DL (ref 74–106)
GLUCOSE SERPL-MCNC: NEGATIVE MG/DL
HCT VFR BLD CALC: 31.9 % (ref 36–48)
HGB BLD-MCNC: 10.2 G/DL (ref 12–16)
IMM GRANULOCYTES NFR BLD: 0.2 % (ref 0–5)
KETONES UR STRIP-MCNC: NEGATIVE MG/DL
LIPASE SERPL-CCNC: 36 U/L (ref 73–393)
LYMPHOCYTES NFR BLD AUTO: 22.6 % (ref 15–50)
MCH RBC QN AUTO: 29.1 PG (ref 26–34)
MCHC RBC AUTO-ENTMCNC: 32 G/DL (ref 31–37)
MCV RBC: 91.1 FL (ref 80–100)
MONOCYTES NFR BLD: 8.5 % (ref 2–11)
NEUTROPHILS NFR BLD AUTO: 67.8 % (ref 40–80)
NITRITE UR-MCNC: NEGATIVE MG/ML
OSMOLALITY SERPL CALC.SUM OF ELEC: 286 MOSM/KG (ref 275–300)
PH UR STRIP: 6 [PH] (ref 5–6)
PLATELET # BLD: 182 10X3/UL (ref 130–400)
PMV BLD AUTO: 9.1 FL (ref 7.4–10.4)
POTASSIUM SERPL-SCNC: 3.2 MMOL/L (ref 3.5–5.1)
PROT SERPL-MCNC: 6.6 G/DL (ref 6.4–8.2)
RBC # BLD AUTO: 3.5 10X6/UL (ref 4–5.4)
RBC #/AREA URNS HPF: (no result) /HPF (ref 0–5)
SODIUM SERPL-SCNC: 141 MMOL/L (ref 136–145)
SP GR UR STRIP: 1.01 (ref 1–1.02)
SQUAMOUS #/AREA URNS HPF: (no result) /HPF (ref 0–5)
TROPONIN I SERPL-MCNC: < 0.017 NG/ML (ref 0–0.06)
UROBILINOGEN UR-MCNC: NORMAL MG/DL
WBC # BLD AUTO: 10.5 10X3/UL (ref 4.8–10.8)
WBC #/AREA URNS HPF: (no result) /HPF

## 2020-05-21 NOTE — OP
PATIENT NAME:  MIREYA BENOIT                        MEDICAL RECORD: [id number]
:[birth year]                                             LOCATION:D.M2     D.2109
                                                         ADMISSION DATE:20
SURGEON:  WILD STEVEN MD          
 
 
DATE OF OPERATION:  2020
 
PROCEDURE:  Left heart catheterization, selective coronary angiography, right
radial approach.
 
CATHETERS:  Radial sheath, Tiger catheter.
 
The procedure was well tolerated.  The patient returned to the minor.  Sheath
removed.  TR band was placed.
 
FINDINGS:  Left ventriculography in 30-degree DELUCA view:  Normal wall motion. 
Normal systolic function.
 
CORONARY ANATOMY:
Left main:  Left main is free of disease.
LAD:  LAD An in the area of previous stenting, both distally and proximally
widely patent.  No evidence of progression of disease.
CIRCUMFLEX:  Left dominant system with circumflex gives rise to PDA.  This is
free of disease.
Right coronary is rudimentary and has luminal irregularities.
 
IMPRESSION:  Widely patent stents.  No significant progression of previous
coronary artery disease.  Medical management.  Suspect troponin elevation
secondary to enzyme leak and renal insufficiency.
 
TRANSINT:[id number] Voice Confirmation ID: [id number] DOCUMENT ID: [id number]
                                           
                                           WILD STEVEN MD          
 
 
 
Electronically Signed by WILD STEVEN on 20 at 0804
 
 
 
 
 
 
 
 
 
 
 
 
CC:                                                             [id number]
DICTATION DATE: 20 0938     :     20 1527      DIS IN  
                                                                      20
[facility]                                          
[address] [place] [address]

## 2020-05-22 VITALS — SYSTOLIC BLOOD PRESSURE: 148 MMHG | DIASTOLIC BLOOD PRESSURE: 86 MMHG

## 2020-05-22 NOTE — MORECARE
CASE MANAGEMENT DISCHARGE SUMMARY
 
 
PATIENT: MIREYA BENOIT                  UNIT: [id number]
ACCOUNT#: [id number]                       ADM DATE: 20
AGE: 59     : 64  SEX: F            ROOM/BED: D.2369    
AUTHOR: DIAMONDDOC                             PHYSICIAN:                               
 
REFERRING PHYSICIAN: JOAQUINA BARFIELD MD    
DATE OF SERVICE: 20
Discharge Plan
 
 
Patient Name: MIREYA BENOIT
Facility: [facility]:[place]
Encounter #: [id number]
Medical Record #: [id number]
: 1960
Planned Disposition: Home
Anticipated Discharge Date: 20
 
Discharge Date: 2020
Expected LOS: 4
Initial Reviewer: [email or web address]
Initial Review Date: 2020
Generated: 20   5:49 pm 
Comments
 
DCP- Discharge Planning
 
Updated by [id number]: Emily Whiting on 20   3:20 pm CT
Patient Name: MIREYA BENOIT                                     
Admission Status: ER   
Accout number: [id number]                              
Admission Date: 2020   
: 1960                                                        
Admission Diagnosis:DISORIENTATION, UNSPECIFIED   
Attending: JOAQUINA BARFIELD                                                
Current LOS:  4   
  
Anticipated DC Date: 2020   
Planned Disposition: Home   
[facility] Insurance: [facility]   
  
  
Discharge Planning Comments: CM met with patient to discuss discharge 
planning/needs. States she lives with her daughter, Franci. States Franci assists 
her with her care. She states she is independent with her care, but then 
states Franci brings her medication to her and helps with wound care. I 
discussed availability of home health, DME and rehab and she declines. I 
 
informed her that Dr. Barfield ordered home health and she states her 
daughter can help her with her care. I called [facility] and spoke with 
Nora (she was set up with home health last week according to notes) and 
Nora states that they tried several times to admit the patient, but the 
patient kept refusing for them to come out. Declination for home health 
signed by patient. She states she will have someone pick her up for discharge,
 she isn't sure whom yet. Declines needs.   
  
  
  
  
  
  
: Emily Whiting
 DCPIA - Discharge Planning Initial Assessment
 
Updated by [email or web address]: Emily Whiting on 20   4:15 pm
*  Is the patient Alert and Oriented?
Yes
*  How many steps to enter\exit or inside your home? 8-10/0 *  PCP Dr. Barfield * 
Pharmacy
[facility] [address]
*  Preadmission Environment
Home with Family
*  ADLs
Independent
*  Equipment
Cane
Glucometer
Shower Chair
Walker
*  List name and contact numbers for known caregivers / representatives who 
currently or will assist patient after discharge:
Franci Benoit [facility] - [phone]  Kelly LamaOrtiz  [phone]
 
*  Verbal permission to speak to the caregivers and representatives has been 
obtained from the patient.
Yes
*  Community resources currently utilized
None
*  Additional services required to return to the preadmission environment?
No
*  Can the patient safely return to the preadmission environment?
Yes
*  Has this patient been hospitalized within the prior 30 days at any 
hospital?
Yes
 
 
 
 
 
 
Coverage Notice
 
Reviewer: [email or web address] Whiting
 
Notice Issued Date-Time: 2020  16:20
Notice Type: IM Discharge Notice
 
Notice Delivered To: Patient
Relationship to Patient: Self
Representative Name: 
 
Delivery Method: HAND - Hand Delivered
Elba Days:
Prior Verbal Notification: 
 
Recipient Understood Notice: Yes
Recipient Signature: Yes
Med Rec Note Co-signed by Attending:
 
Coverage Notice Comment:  IMM explained, signed, given, copy placed in MR
Reviewer: [id number] Obdulio Whiting
 
Notice Issued Date-Time: 2020  16:20
Notice Type: Patient Choice Letter
 
Notice Delivered To: Patient
Relationship to Patient: Self
Representative Name: 
 
Delivery Method: HAND - Hand Delivered
Elba Days:
Prior Verbal Notification: 
 
Recipient Understood Notice: Yes
Recipient Signature: Yes
Med Rec Note Co-signed by Attending:
 
Coverage Notice Comment:  JACKY FOR DECLINATION OF HHS SIGNED
 
Last DP export: 20   3:27 p
Patient Name: MIREYA BENOIT
 
Encounter #: [id number]
Page [phone]
 
 
 
 
 
Electronically Signed by TEO FIELDS on 20 at 1650
 
 
 
 
 
 
**All edits/amendments must be made on the electronic document**
 
DICTATION DATE: 20     : ROSALES  20     
RPT#: [id number]                                DC DATE:20
                                               STATUS: DIS IN  
[facility]
[address] [place] [address]
***END OF REPORT***

## 2020-07-14 ENCOUNTER — HOSPITAL ENCOUNTER (INPATIENT)
Dept: HOSPITAL 84 - D.ER | Age: 60
LOS: 6 days | Discharge: HOME | DRG: 640 | End: 2020-07-20
Attending: FAMILY MEDICINE | Admitting: FAMILY MEDICINE
Payer: COMMERCIAL

## 2020-07-14 VITALS
BODY MASS INDEX: 34.86 KG/M2 | HEIGHT: 68 IN | BODY MASS INDEX: 34.86 KG/M2 | WEIGHT: 230 LBS | HEIGHT: 68 IN | WEIGHT: 230 LBS | BODY MASS INDEX: 34.86 KG/M2

## 2020-07-14 VITALS — DIASTOLIC BLOOD PRESSURE: 69 MMHG | SYSTOLIC BLOOD PRESSURE: 160 MMHG

## 2020-07-14 DIAGNOSIS — G93.41: ICD-10-CM

## 2020-07-14 DIAGNOSIS — E66.01: ICD-10-CM

## 2020-07-14 DIAGNOSIS — E11.65: ICD-10-CM

## 2020-07-14 DIAGNOSIS — E87.6: Primary | ICD-10-CM

## 2020-07-14 DIAGNOSIS — K72.90: ICD-10-CM

## 2020-07-14 DIAGNOSIS — R10.31: ICD-10-CM

## 2020-07-14 DIAGNOSIS — K21.9: ICD-10-CM

## 2020-07-14 DIAGNOSIS — N17.9: ICD-10-CM

## 2020-07-14 DIAGNOSIS — I10: ICD-10-CM

## 2020-07-14 DIAGNOSIS — I25.10: ICD-10-CM

## 2020-07-14 LAB
ALBUMIN SERPL-MCNC: 2.8 G/DL (ref 3.4–5)
ALP SERPL-CCNC: 154 U/L (ref 30–120)
ALT SERPL-CCNC: 20 U/L (ref 10–68)
AMPHETAMINES UR QL SCN: NEGATIVE QUAL
ANION GAP SERPL CALC-SCNC: 13.9 MMOL/L (ref 8–16)
APTT BLD: 28.9 SECONDS (ref 22.8–39.4)
BACTERIA #/AREA URNS HPF: (no result) /HPF
BARBITURATES UR QL SCN: NEGATIVE QUAL
BENZODIAZ UR QL SCN: NEGATIVE QUAL
BILIRUB SERPL-MCNC: 0.78 MG/DL (ref 0.2–1.3)
BILIRUB SERPL-MCNC: NEGATIVE MG/DL
BUN SERPL-MCNC: 21 MG/DL (ref 7–18)
BZE UR QL SCN: NEGATIVE QUAL
CALCIUM SERPL-MCNC: 8 MG/DL (ref 8.5–10.1)
CANNABINOIDS UR QL SCN: NEGATIVE QUAL
CHLORIDE SERPL-SCNC: 98 MMOL/L (ref 98–107)
CK MB SERPL-MCNC: 8.7 U/L (ref 0–3.6)
CK SERPL-CCNC: 766 UL (ref 21–215)
CO2 SERPL-SCNC: 27 MMOL/L (ref 21–32)
CREAT SERPL-MCNC: 1.5 MG/DL (ref 0.6–1.3)
ERYTHROCYTE [DISTWIDTH] IN BLOOD BY AUTOMATED COUNT: 14.7 % (ref 11.5–14.5)
GLOBULIN SER-MCNC: 3.2 G/L
GLUCOSE SERPL-MCNC: 276 MG/DL (ref 74–106)
GLUCOSE SERPL-MCNC: NEGATIVE MG/DL
HCT VFR BLD CALC: 36.3 % (ref 36–48)
HGB BLD-MCNC: 12.1 G/DL (ref 12–16)
INR PPP: 0.99 (ref 0.85–1.17)
KETONES UR STRIP-MCNC: NEGATIVE MG/DL
LYMPHOCYTES NFR BLD AUTO: 17.1 % (ref 15–50)
MAGNESIUM SERPL-MCNC: 1.5 MG/DL (ref 1.8–2.4)
MCH RBC QN AUTO: 29.6 PG (ref 26–34)
MCHC RBC AUTO-ENTMCNC: 33.3 G/DL (ref 31–37)
MCV RBC: 88.8 FL (ref 80–100)
NEUTROPHILS NFR BLD AUTO: 77.1 % (ref 40–80)
NITRITE UR-MCNC: NEGATIVE MG/ML
OPIATES UR QL SCN: POSITIVE QUAL
OSMOLALITY SERPL CALC.SUM OF ELEC: 280 MOSM/KG (ref 275–300)
PCP UR QL SCN: NEGATIVE QUAL
PH UR STRIP: 8 [PH] (ref 5–6)
PLATELET # BLD: 389 10X3/UL (ref 130–400)
PMV BLD AUTO: 8.7 FL (ref 7.4–10.4)
POTASSIUM SERPL-SCNC: 4.9 MMOL/L (ref 3.5–5.1)
PROT SERPL-MCNC: 6 G/DL (ref 6.4–8.2)
PROTHROMBIN TIME: 13.1 SECONDS (ref 11.6–15)
RBC # BLD AUTO: 4.09 10X6/UL (ref 4–5.4)
RBC #/AREA URNS HPF: (no result) /HPF (ref 0–5)
SODIUM SERPL-SCNC: 134 MMOL/L (ref 136–145)
SP GR UR STRIP: 1.01 (ref 1–1.02)
SQUAMOUS #/AREA URNS HPF: (no result) /HPF (ref 0–5)
TROPONIN I SERPL-MCNC: 0.02 NG/ML (ref 0–0.06)
TSH SERPL-ACNC: 5.6 UIU/ML (ref 0.36–3.74)
UROBILINOGEN UR-MCNC: NORMAL MG/DL
WBC # BLD AUTO: 13.4 10X3/UL (ref 4.8–10.8)
WBC #/AREA URNS HPF: (no result) /HPF

## 2020-07-15 VITALS — DIASTOLIC BLOOD PRESSURE: 73 MMHG | SYSTOLIC BLOOD PRESSURE: 150 MMHG

## 2020-07-15 VITALS — SYSTOLIC BLOOD PRESSURE: 158 MMHG | DIASTOLIC BLOOD PRESSURE: 79 MMHG

## 2020-07-15 VITALS — DIASTOLIC BLOOD PRESSURE: 90 MMHG | SYSTOLIC BLOOD PRESSURE: 147 MMHG

## 2020-07-15 VITALS — SYSTOLIC BLOOD PRESSURE: 193 MMHG | DIASTOLIC BLOOD PRESSURE: 83 MMHG

## 2020-07-15 VITALS — DIASTOLIC BLOOD PRESSURE: 91 MMHG | SYSTOLIC BLOOD PRESSURE: 119 MMHG

## 2020-07-15 VITALS — SYSTOLIC BLOOD PRESSURE: 150 MMHG | DIASTOLIC BLOOD PRESSURE: 64 MMHG

## 2020-07-15 VITALS — DIASTOLIC BLOOD PRESSURE: 67 MMHG | SYSTOLIC BLOOD PRESSURE: 157 MMHG

## 2020-07-15 VITALS — DIASTOLIC BLOOD PRESSURE: 75 MMHG | SYSTOLIC BLOOD PRESSURE: 177 MMHG

## 2020-07-15 VITALS — SYSTOLIC BLOOD PRESSURE: 167 MMHG | DIASTOLIC BLOOD PRESSURE: 81 MMHG

## 2020-07-15 LAB
ALBUMIN SERPL-MCNC: 2.9 G/DL (ref 3.4–5)
ALP SERPL-CCNC: 148 U/L (ref 30–120)
ALT SERPL-CCNC: 17 U/L (ref 10–68)
ANION GAP SERPL CALC-SCNC: 11.2 MMOL/L (ref 8–16)
BILIRUB SERPL-MCNC: 0.54 MG/DL (ref 0.2–1.3)
BUN SERPL-MCNC: 24 MG/DL (ref 7–18)
CALCIUM SERPL-MCNC: 8.1 MG/DL (ref 8.5–10.1)
CHLORIDE SERPL-SCNC: 102 MMOL/L (ref 98–107)
CO2 SERPL-SCNC: 30 MMOL/L (ref 21–32)
CREAT SERPL-MCNC: 2.1 MG/DL (ref 0.6–1.3)
ERYTHROCYTE [DISTWIDTH] IN BLOOD BY AUTOMATED COUNT: 14.6 % (ref 11.5–14.5)
GLOBULIN SER-MCNC: 2.8 G/L
GLUCOSE SERPL-MCNC: 166 MG/DL (ref 74–106)
HCT VFR BLD CALC: 33.6 % (ref 36–48)
HGB BLD-MCNC: 11.4 G/DL (ref 12–16)
LYMPHOCYTES NFR BLD AUTO: 25.3 % (ref 15–50)
MCH RBC QN AUTO: 30.6 PG (ref 26–34)
MCHC RBC AUTO-ENTMCNC: 33.9 G/DL (ref 31–37)
MCV RBC: 90.1 FL (ref 80–100)
NEUTROPHILS NFR BLD AUTO: 67.2 % (ref 40–80)
OSMOLALITY SERPL CALC.SUM OF ELEC: 286 MOSM/KG (ref 275–300)
PLATELET # BLD: 365 10X3/UL (ref 130–400)
PMV BLD AUTO: 8.7 FL (ref 7.4–10.4)
POTASSIUM SERPL-SCNC: 3.2 MMOL/L (ref 3.5–5.1)
PROT SERPL-MCNC: 5.7 G/DL (ref 6.4–8.2)
RBC # BLD AUTO: 3.73 10X6/UL (ref 4–5.4)
SODIUM SERPL-SCNC: 140 MMOL/L (ref 136–145)
WBC # BLD AUTO: 12.9 10X3/UL (ref 4.8–10.8)

## 2020-07-15 NOTE — NUR
RECEIVED PATIENT FROM ER. ALERT AND ORIENTED X3. NO C/O PAIN. NO S/S OF ACUTE
DISTRESS NOTED. IV TO RIGHT FOREARM, SL. SITE PATENT WITHOUT REDNESS OR
SWELLING. UP WITH ASSIST. VITALS STABLE, EXCEPT /83. DENIES ANY NEEDS AT
THIS TIME. CALL LIGHT IN REACH. POSEY ALARM ON. WILL CONTINUE TO MONITOR.

## 2020-07-15 NOTE — NUR
PT STATES SHE IS TIRED NOW. ASSISTED PT BACK INTO BED, VSS. PT REQUESTING
LIGHTS OFF. DENIES ANY OTHER NEEDS. RESPIRATIONS EVEN AND UNLABORED.

## 2020-07-15 NOTE — NUR
PT GIVEN TURKEY SANDWICH AND ICE WATER WITH ORANGE SLICES. PT PICKED AT ORANGE
SLICES AND ATE MOST OF THEM. TOOK ONE BITE OUT OF SANDWICH. 
PT ORIENTED TO SELF AND PLACE. UNAWARE OF DATE OR WHY SHE IS HERE. SHE DOES
KNOW WHO OUR PRESIDENT IS.

## 2020-07-15 NOTE — NUR
RESTING IN BED WITH EYES OPEN. NO C/O PAIN. NO S/S OF ACUTE DISTRESS NOTED.
DENIES ANY NEEDS AT THIS TIME. CALL LIGHT IN REACH. WILL CONTINUE TO MONITOR.

## 2020-07-15 NOTE — NUR
PT RESTING RIGHT SIDE LYING, ARROUSES TO VERBAL AND TACTILE STIMULI. SHE
ANSWERS QUESTIONS APPROPRIATELY AND DENIES NEEDS. STILL ORIENTED TO SELF AND
PLACE. NO SIGNS DISTRESS NOTED.

## 2020-07-16 VITALS — SYSTOLIC BLOOD PRESSURE: 148 MMHG | DIASTOLIC BLOOD PRESSURE: 57 MMHG

## 2020-07-16 VITALS — SYSTOLIC BLOOD PRESSURE: 173 MMHG | DIASTOLIC BLOOD PRESSURE: 99 MMHG

## 2020-07-16 VITALS — SYSTOLIC BLOOD PRESSURE: 168 MMHG | DIASTOLIC BLOOD PRESSURE: 73 MMHG

## 2020-07-16 VITALS — DIASTOLIC BLOOD PRESSURE: 76 MMHG | SYSTOLIC BLOOD PRESSURE: 165 MMHG

## 2020-07-16 VITALS — DIASTOLIC BLOOD PRESSURE: 75 MMHG | SYSTOLIC BLOOD PRESSURE: 163 MMHG

## 2020-07-16 VITALS — DIASTOLIC BLOOD PRESSURE: 70 MMHG | SYSTOLIC BLOOD PRESSURE: 151 MMHG

## 2020-07-16 NOTE — NUR
PT RESTING IN BED. EYES CLOSED. NO SIGNS OF DISTRESS. BREATHING EVEN AND
UNLABORED. IV SITE RT FA DRESSING CLEAN DRY AND INTACT. NO SIGNS OF INFECTION
OR INFULTRATION. LUNG SOUNDS CLEAR. BOWEL SOUNDS ACTIVE. NAUSEA AT TIMES. SKIN
CLEAN DRY AND INTACT. SKIN CLEAN DRY AND INTACT. WILL CONTINUE PLAN OF CARE.
CALL LIGHT IN REACH. BED LOWERED AND LOCKED. BED RAILS UPX2.

## 2020-07-16 NOTE — NUR
PT RESTING IN BED WITH EYES CLOSED.  EASILY AROUSED WITH NAME CALLED.  RESP
EVEN AND UNLABORED.  SALINE LOC TO RIGHT FOREARM SITE WITHOUT REDNESS OR
EDEMA.  DENIES FURTHER NEEDS AT THIS TIME.  CL WITHIN REACH.  ENCOURAGED TO
CALL WITH NEEDS.  CONTINUE POC

## 2020-07-17 VITALS — DIASTOLIC BLOOD PRESSURE: 81 MMHG | SYSTOLIC BLOOD PRESSURE: 178 MMHG

## 2020-07-17 VITALS — SYSTOLIC BLOOD PRESSURE: 193 MMHG | DIASTOLIC BLOOD PRESSURE: 77 MMHG

## 2020-07-17 VITALS — SYSTOLIC BLOOD PRESSURE: 191 MMHG | DIASTOLIC BLOOD PRESSURE: 84 MMHG

## 2020-07-17 VITALS — DIASTOLIC BLOOD PRESSURE: 89 MMHG | SYSTOLIC BLOOD PRESSURE: 149 MMHG

## 2020-07-17 VITALS — DIASTOLIC BLOOD PRESSURE: 67 MMHG | SYSTOLIC BLOOD PRESSURE: 156 MMHG

## 2020-07-17 LAB
ALBUMIN SERPL-MCNC: 2.5 G/DL (ref 3.4–5)
ALP SERPL-CCNC: 132 U/L (ref 30–120)
ALT SERPL-CCNC: 20 U/L (ref 10–68)
ANION GAP SERPL CALC-SCNC: 11.1 MMOL/L (ref 8–16)
BILIRUB SERPL-MCNC: 0.39 MG/DL (ref 0.2–1.3)
BUN SERPL-MCNC: 28 MG/DL (ref 7–18)
CALCIUM SERPL-MCNC: 7.6 MG/DL (ref 8.5–10.1)
CHLORIDE SERPL-SCNC: 102 MMOL/L (ref 98–107)
CO2 SERPL-SCNC: 27.8 MMOL/L (ref 21–32)
CREAT SERPL-MCNC: 2.3 MG/DL (ref 0.6–1.3)
ERYTHROCYTE [DISTWIDTH] IN BLOOD BY AUTOMATED COUNT: 14.8 % (ref 11.5–14.5)
GLOBULIN SER-MCNC: 3.5 G/L
GLUCOSE SERPL-MCNC: 170 MG/DL (ref 74–106)
HCT VFR BLD CALC: 33.8 % (ref 36–48)
HGB BLD-MCNC: 11 G/DL (ref 12–16)
LYMPHOCYTES NFR BLD AUTO: 28.5 % (ref 15–50)
MAGNESIUM SERPL-MCNC: 2 MG/DL (ref 1.8–2.4)
MCH RBC QN AUTO: 30.1 PG (ref 26–34)
MCHC RBC AUTO-ENTMCNC: 32.5 G/DL (ref 31–37)
MCV RBC: 92.3 FL (ref 80–100)
NEUTROPHILS NFR BLD AUTO: 63.4 % (ref 40–80)
OSMOLALITY SERPL CALC.SUM OF ELEC: 285 MOSM/KG (ref 275–300)
PLATELET # BLD: 321 10X3/UL (ref 130–400)
PMV BLD AUTO: 9.1 FL (ref 7.4–10.4)
POTASSIUM SERPL-SCNC: 2.9 MMOL/L (ref 3.5–5.1)
PROT SERPL-MCNC: 6 G/DL (ref 6.4–8.2)
RBC # BLD AUTO: 3.66 10X6/UL (ref 4–5.4)
SODIUM SERPL-SCNC: 138 MMOL/L (ref 136–145)
WBC # BLD AUTO: 12.7 10X3/UL (ref 4.8–10.8)

## 2020-07-17 NOTE — NUR
HER IV IS OUT, REMOVED. 2 ATTEMPTS TO START ANOTHER ONE. VASCULAR ACCESS NURSE
CONSULTED. THE CALL LIGHT IS WITHIN REACH AND THE BED ALARM IS ON.

## 2020-07-17 NOTE — NUR
PT RESTING IN BED. EYES CLOSED. NO SIGNS OF DISTRESS. BREATHING EVEN AND
UNLABORED. IV SITE LRT FA DRESSING CLEAN DRY AND INTACT. NO SIGNS OF INFECTION
OR INFULTRATION. LUNG SOUNDS CLEAR. BOWEL SOUNDS ACTIVE. SKIN CLEAN DRY AND
INTACT. WILL CONTINUE PLAN OF CARE. CALL LIGHT IN REACH. BED LOWERED AND
LOCKED. BED RAILS UPX1.

## 2020-07-18 VITALS — DIASTOLIC BLOOD PRESSURE: 76 MMHG | SYSTOLIC BLOOD PRESSURE: 152 MMHG

## 2020-07-18 VITALS — DIASTOLIC BLOOD PRESSURE: 81 MMHG | SYSTOLIC BLOOD PRESSURE: 172 MMHG

## 2020-07-18 VITALS — DIASTOLIC BLOOD PRESSURE: 78 MMHG | SYSTOLIC BLOOD PRESSURE: 153 MMHG

## 2020-07-18 VITALS — SYSTOLIC BLOOD PRESSURE: 148 MMHG | DIASTOLIC BLOOD PRESSURE: 74 MMHG

## 2020-07-18 VITALS — DIASTOLIC BLOOD PRESSURE: 86 MMHG | SYSTOLIC BLOOD PRESSURE: 177 MMHG

## 2020-07-18 VITALS — SYSTOLIC BLOOD PRESSURE: 191 MMHG | DIASTOLIC BLOOD PRESSURE: 76 MMHG

## 2020-07-18 LAB
ALBUMIN SERPL-MCNC: 2.5 G/DL (ref 3.4–5)
ALP SERPL-CCNC: 140 U/L (ref 30–120)
ALT SERPL-CCNC: 19 U/L (ref 10–68)
ANION GAP SERPL CALC-SCNC: 12.4 MMOL/L (ref 8–16)
BASOPHILS NFR BLD AUTO: 0.2 % (ref 0–2)
BILIRUB SERPL-MCNC: 0.52 MG/DL (ref 0.2–1.3)
BUN SERPL-MCNC: 22 MG/DL (ref 7–18)
CALCIUM SERPL-MCNC: 8.1 MG/DL (ref 8.5–10.1)
CHLORIDE SERPL-SCNC: 103 MMOL/L (ref 98–107)
CO2 SERPL-SCNC: 25.8 MMOL/L (ref 21–32)
CREAT SERPL-MCNC: 1.7 MG/DL (ref 0.6–1.3)
EOSINOPHIL NFR BLD: 1.6 % (ref 0–7)
ERYTHROCYTE [DISTWIDTH] IN BLOOD BY AUTOMATED COUNT: 14.6 % (ref 11.5–14.5)
GLOBULIN SER-MCNC: 3.4 G/L
GLUCOSE SERPL-MCNC: 237 MG/DL (ref 74–106)
HCT VFR BLD CALC: 33.7 % (ref 36–48)
HGB BLD-MCNC: 10.8 G/DL (ref 12–16)
IMM GRANULOCYTES NFR BLD: 0.2 % (ref 0–5)
LYMPHOCYTES NFR BLD AUTO: 24.7 % (ref 15–50)
MAGNESIUM SERPL-MCNC: 2 MG/DL (ref 1.8–2.4)
MCH RBC QN AUTO: 29.4 PG (ref 26–34)
MCHC RBC AUTO-ENTMCNC: 32 G/DL (ref 31–37)
MCV RBC: 91.8 FL (ref 80–100)
MONOCYTES NFR BLD: 7 % (ref 2–11)
NEUTROPHILS NFR BLD AUTO: 66.3 % (ref 40–80)
OSMOLALITY SERPL CALC.SUM OF ELEC: 286 MOSM/KG (ref 275–300)
PLATELET # BLD: 301 10X3/UL (ref 130–400)
PMV BLD AUTO: 9.1 FL (ref 7.4–10.4)
POTASSIUM SERPL-SCNC: 3.2 MMOL/L (ref 3.5–5.1)
PROT SERPL-MCNC: 5.9 G/DL (ref 6.4–8.2)
RBC # BLD AUTO: 3.67 10X6/UL (ref 4–5.4)
SODIUM SERPL-SCNC: 138 MMOL/L (ref 136–145)
WBC # BLD AUTO: 12.5 10X3/UL (ref 4.8–10.8)

## 2020-07-18 NOTE — NUR
PT ALERT X 4. BREATH SOUNDS CLEAR BILAT. IV TO LEFT HAND, PATENT, DRESSING
CDI. SORES TO LOWER EXTREMITIES. PT REPORTING PAIN OF 5/10, MEDICATED PER
ORDERS, WILL CONTINUE TO MONITOR. BED LOW, CALL LIGHT IN REACH. NO OTHER NEEDS
AT THIS TIME.

## 2020-07-19 VITALS — SYSTOLIC BLOOD PRESSURE: 180 MMHG | DIASTOLIC BLOOD PRESSURE: 79 MMHG

## 2020-07-19 VITALS — SYSTOLIC BLOOD PRESSURE: 176 MMHG | DIASTOLIC BLOOD PRESSURE: 81 MMHG

## 2020-07-19 VITALS — SYSTOLIC BLOOD PRESSURE: 118 MMHG | DIASTOLIC BLOOD PRESSURE: 67 MMHG

## 2020-07-19 VITALS — SYSTOLIC BLOOD PRESSURE: 116 MMHG | DIASTOLIC BLOOD PRESSURE: 57 MMHG

## 2020-07-19 VITALS — SYSTOLIC BLOOD PRESSURE: 183 MMHG | DIASTOLIC BLOOD PRESSURE: 65 MMHG

## 2020-07-19 VITALS — SYSTOLIC BLOOD PRESSURE: 123 MMHG | DIASTOLIC BLOOD PRESSURE: 61 MMHG

## 2020-07-19 LAB
ANION GAP SERPL CALC-SCNC: 11.4 MMOL/L (ref 8–16)
BASOPHILS NFR BLD AUTO: 0.2 % (ref 0–2)
BUN SERPL-MCNC: 19 MG/DL (ref 7–18)
CALCIUM SERPL-MCNC: 8.4 MG/DL (ref 8.5–10.1)
CHLORIDE SERPL-SCNC: 105 MMOL/L (ref 98–107)
CO2 SERPL-SCNC: 25.8 MMOL/L (ref 21–32)
CREAT SERPL-MCNC: 1.5 MG/DL (ref 0.6–1.3)
EOSINOPHIL NFR BLD: 2.9 % (ref 0–7)
ERYTHROCYTE [DISTWIDTH] IN BLOOD BY AUTOMATED COUNT: 14.5 % (ref 11.5–14.5)
GLUCOSE SERPL-MCNC: 251 MG/DL (ref 74–106)
HCT VFR BLD CALC: 32 % (ref 36–48)
HGB BLD-MCNC: 10.3 G/DL (ref 12–16)
IMM GRANULOCYTES NFR BLD: 0.3 % (ref 0–5)
LYMPHOCYTES NFR BLD AUTO: 27.9 % (ref 15–50)
MAGNESIUM SERPL-MCNC: 2 MG/DL (ref 1.8–2.4)
MCH RBC QN AUTO: 29.4 PG (ref 26–34)
MCHC RBC AUTO-ENTMCNC: 32.2 G/DL (ref 31–37)
MCV RBC: 91.4 FL (ref 80–100)
MONOCYTES NFR BLD: 7.8 % (ref 2–11)
NEUTROPHILS NFR BLD AUTO: 60.9 % (ref 40–80)
OSMOLALITY SERPL CALC.SUM OF ELEC: 287 MOSM/KG (ref 275–300)
PLATELET # BLD: 268 10X3/UL (ref 130–400)
PMV BLD AUTO: 9.2 FL (ref 7.4–10.4)
POTASSIUM SERPL-SCNC: 3.2 MMOL/L (ref 3.5–5.1)
RBC # BLD AUTO: 3.5 10X6/UL (ref 4–5.4)
SODIUM SERPL-SCNC: 139 MMOL/L (ref 136–145)
WBC # BLD AUTO: 9.7 10X3/UL (ref 4.8–10.8)

## 2020-07-19 NOTE — NUR
WATCHING TV. NO DISTRESS NOTED. SL TO LEFT HAND INTACT WIHTOUT REDNESS OR
EDEMA NOTED.MILD EDEMA NOTED TO EXTREMITIES. CL IN REACH

## 2020-07-20 VITALS — SYSTOLIC BLOOD PRESSURE: 70 MMHG | DIASTOLIC BLOOD PRESSURE: 50 MMHG

## 2020-07-20 VITALS — SYSTOLIC BLOOD PRESSURE: 135 MMHG | DIASTOLIC BLOOD PRESSURE: 62 MMHG

## 2020-07-20 VITALS — SYSTOLIC BLOOD PRESSURE: 156 MMHG | DIASTOLIC BLOOD PRESSURE: 68 MMHG

## 2020-07-20 LAB
ANION GAP SERPL CALC-SCNC: 7.2 MMOL/L (ref 8–16)
BASOPHILS NFR BLD AUTO: 0.2 % (ref 0–2)
BUN SERPL-MCNC: 22 MG/DL (ref 7–18)
CALCIUM SERPL-MCNC: 7.8 MG/DL (ref 8.5–10.1)
CHLORIDE SERPL-SCNC: 103 MMOL/L (ref 98–107)
CO2 SERPL-SCNC: 28.1 MMOL/L (ref 21–32)
CREAT SERPL-MCNC: 1.9 MG/DL (ref 0.6–1.3)
EOSINOPHIL NFR BLD: 2.6 % (ref 0–7)
ERYTHROCYTE [DISTWIDTH] IN BLOOD BY AUTOMATED COUNT: 14.6 % (ref 11.5–14.5)
GLUCOSE SERPL-MCNC: 240 MG/DL (ref 74–106)
HCT VFR BLD CALC: 30.7 % (ref 36–48)
HGB BLD-MCNC: 9.7 G/DL (ref 12–16)
IMM GRANULOCYTES NFR BLD: 0.2 % (ref 0–5)
LYMPHOCYTES NFR BLD AUTO: 28.3 % (ref 15–50)
MAGNESIUM SERPL-MCNC: 1.9 MG/DL (ref 1.8–2.4)
MCH RBC QN AUTO: 29.3 PG (ref 26–34)
MCHC RBC AUTO-ENTMCNC: 31.6 G/DL (ref 31–37)
MCV RBC: 92.7 FL (ref 80–100)
MONOCYTES NFR BLD: 8.6 % (ref 2–11)
NEUTROPHILS NFR BLD AUTO: 60.1 % (ref 40–80)
OSMOLALITY SERPL CALC.SUM OF ELEC: 280 MOSM/KG (ref 275–300)
PLATELET # BLD: 268 10X3/UL (ref 130–400)
PMV BLD AUTO: 9.5 FL (ref 7.4–10.4)
POTASSIUM SERPL-SCNC: 3.3 MMOL/L (ref 3.5–5.1)
RBC # BLD AUTO: 3.31 10X6/UL (ref 4–5.4)
SODIUM SERPL-SCNC: 135 MMOL/L (ref 136–145)
WBC # BLD AUTO: 9.6 10X3/UL (ref 4.8–10.8)

## 2020-07-20 NOTE — MORECARE
CASE MANAGEMENT DISCHARGE SUMMARY
 
 
PATIENT: MIREYA BENOIT                  UNIT: [id number]
ACCOUNT#: [id number]                       ADM DATE: 20
AGE: 59     : 64  SEX: F            ROOM/BED: D.2211    
AUTHOR: DIAMOND,DOC                             PHYSICIAN:                               
 
REFERRING PHYSICIAN: JOAQUINA BARFIELD MD    
DATE OF SERVICE: 20
Discharge Plan
 
 
Patient Name: MIREYA BENOIT
Facility: [facility]:[place]
Encounter #: [id number]
Medical Record #: [id number]
: 1960
Planned Disposition: Home or Self Care
Anticipated Discharge Date: 
 
Discharge Date: 
Expected LOS: 
Initial Reviewer: [email or web address]
Initial Review Date: 07/15/2020
Generated: 20   4:56 pm 
Comments
 
DCP- Discharge Planning
 
Updated by [id number]: Jaci Pavon on 20   2:50 pm CT
Patient Name: MIREYA BENOIT                                     
Admission Status: ER   
Accout number: [id number]                              
Admission Date: 2020   
: 1960                                                        
Admission Diagnosis:METABOLIC ENCEPHALOPATHY   
Attending: JOAQUINA BARFIELD                                                
Current LOS:  4   
  
Anticipated DC Date:    
Planned Disposition: Home or Self Care   
Primary Insurance: [employer]   
  
  
Discharge Planning Comments:   
  
CM met with patient to complete initial dc planning assessment.  CM educated 
patient on the CM role and verbal consent given by patient to complete 
assessment.   Patient lives at home with her daughter and  roommate where she 
 
states she is independent with her care.  At discharge patient plans to 
return home and feels this is a safe discharge. Patient plans to take a UBER 
home.   CM discussed availability of home health, rehab services, and medical 
equipment. Patient did not want home health, refusal signed. IMM served and 
explained.  Patient has a CPAP, walker, cane and shower chair at home. 
Patient denied known discharge needs at this time. CM will continue to follow 
and will assist as needed with dc plans/needs.    
  
  
  
  
: Jaci Pavon
 DCPIA - Discharge Planning Initial Assessment
 
Updated by [id number]: Jaci Pavon on 20   3:47 pm
*  Is the patient Alert and Oriented?
Yes
*  How many steps to enter\exit or inside your home? 8/10 *  PCP [facility] *  Pharmacy
[facility]
*  Preadmission Environment
Home with Family
*  ADLs
Independent
*  Equipment
Cane
CPAP
Rolling Walker
Shower Chair
*  List name and contact numbers for known caregivers / representatives who 
currently or will assist patient after discharge:
RAUDEL ( DAUGHTER) [phone]
*  Verbal permission to speak to the caregivers and representatives has been 
obtained from the patient.
N/A
*  Community resources currently utilized
None
*  Additional services required to return to the preadmission environment?
No
*  Can the patient safely return to the preadmission environment?
Yes
*  Has this patient been hospitalized within the prior 30 days at any 
hospital?
No
 
 
 
 
 
Coverage Notice
 
Reviewer: [email or web address] Obdulio Butler
 
 
Notice Issued Date-Time: 07/15/2020  17:24
Notice Type: Medicare Outpatient Observation Notice
 
Notice Delivered To: Patient
Relationship to Patient: 
Representative Name: 
 
Delivery Method: HAND - Hand Delivered
Elba Days:
Prior Verbal Notification: 
 
Recipient Understood Notice: Yes
Recipient Signature: Yes
Med Rec Note Co-signed by Attending:
 
Coverage Notice Comment:  
Reviewer: [email or web address] Obdulio Pavon
 
Notice Issued Date-Time: 2020  15:30
Notice Type: IM Discharge Notice
 
Notice Delivered To: Patient
Relationship to Patient: 
Representative Name: 
 
Delivery Method: HAND - Hand Delivered
Elba Days:
Prior Verbal Notification: 
 
Recipient Understood Notice: Yes
Recipient Signature: Yes
Med Rec Note Co-signed by Attending:
 
Coverage Notice Comment:  
Reviewer: [email or web address] Obdulio Pavon
 
Notice Issued Date-Time: 2020  15:30
Notice Type: Patient Choice Letter
 
Notice Delivered To: Patient
Relationship to Patient: 
Representative Name: 
 
Delivery Method: HAND - Hand Delivered
Elba Days:
Prior Verbal Notification: 
 
Recipient Understood Notice: Yes
Recipient Signature: Yes
Med Rec Note Co-signed by Attending:
 
Coverage Notice Comment:  refusal of hh
 
 
Last DP export: 20   2:48 p
Patient Name: MIREYA BENOIT
Encounter #: [id number]
Page [id number]
 
 
 
 
 
Electronically Signed by TEO FIELDS on 20 at 1557
 
 
 
 
 
 
**All edits/amendments must be made on the electronic document**
 
DICTATION DATE: 20     : ROSALES  20     
RPT#: [id number]                                DC DATE:        
                                               STATUS: ADM IN  
[facility]
[address] [place] [address]
***END OF REPORT***

## 2020-07-20 NOTE — MORECARE
CASE MANAGEMENT DISCHARGE SUMMARY
 
 
PATIENT: MIREYA BENOIT                  UNIT: [id number]
ACCOUNT#: [id number]                       ADM DATE: 20
AGE: 59     : 64  SEX: F            ROOM/BED: D.2211    
AUTHOR: TEO FIELDS                             PHYSICIAN:                               
 
REFERRING PHYSICIAN: JOAQUINA BARFIELD MD    
DATE OF SERVICE: 20
Discharge Plan
 
 
Patient Name: MIREYA BENOIT
Facility: [facility]
Encounter #: [id number]
Medical Record #: [id number]
: 1960
Planned Disposition: Home or Self Care
Anticipated Discharge Date: 
 
Discharge Date: 
Expected LOS: 
Initial Reviewer: [email or web address]
Initial Review Date: 07/15/2020
Generated: 20   4:47 pm 
 DCPIA - Discharge Planning Initial Assessment
 
Updated by [id number]: Jaci Pavon on 20   3:46 pm
*  Is the patient Alert and Oriented?
Yes
*  How many steps to enter\exit or inside your home? 8/10 *  [facility]P [facility] *  Pharmacy
[facility]
*  Preadmission Environment
Home with Family
*  ADLs
Independent
*  Equipment
Cane
CPAP
Rolling Walker
Shower Chair
*  List name and contact numbers for known caregivers / representatives who 
currently or will assist patient after discharge:
RAUDEL ( DAUGHTER) [phone]
*  Verbal permission to speak to the caregivers and representatives has been 
obtained from the patient.
N/A
*  Community resources currently utilized
None
 
*  Additional services required to return to the preadmission environment?
No
*  Can the patient safely return to the preadmission environment?
Yes
*  Has this patient been hospitalized within the prior 30 days at any 
hospital?
Yes
 
 
 
 
 
Coverage Notice
 
Reviewer: [email or web address] Obdulio Butler
 
Notice Issued Date-Time: 07/15/2020  17:24
Notice Type: Medicare Outpatient Observation Notice
 
Notice Delivered To: Patient
Relationship to Patient: 
Representative Name: 
 
Delivery Method: HAND - Hand Delivered
Elba Days:
Prior Verbal Notification: 
 
Recipient Understood Notice: Yes
Recipient Signature: Yes
Med Rec Note Co-signed by Attending:
 
Coverage Notice Comment:  
Patient Name: MIREYA BENOIT
Encounter #: [id number]
Page [id number]
 
 
 
 
 
Electronically Signed by TEO FIELDS on 20 at 1548
 
 
 
 
 
 
**All edits/amendments must be made on the electronic document**
 
DICTATION DATE: 20 154     : DM  20 1547     
RPT#: [id number]                                DC DATE:        
                                               STATUS: ADM IN  
[facility]
 [place] [address]
***END OF REPORT***

## 2020-07-22 NOTE — MORECARE
CASE MANAGEMENT DISCHARGE SUMMARY
 
 
PATIENT: MIREYA BENOIT                  UNIT: [id number]
ACCOUNT#: [id number]                       ADM DATE: 20
AGE: 59     : 64  SEX: F            ROOM/BED: D.2211    
AUTHOR: DIAMOND,DOC                             PHYSICIAN:                               
 
REFERRING PHYSICIAN: JOAQUINA BARFIELD MD    
DATE OF SERVICE: 20
Discharge Plan
 
 
Patient Name: MIREYA BENOIT
Facility: [facility]:[place]
Encounter #: [id number]
Medical Record #: [id number]
: 1960
Planned Disposition: Home or Self Care
Anticipated Discharge Date: 
 
Discharge Date: 2020
Expected LOS: 
Initial Reviewer: [email or web address]
Initial Review Date: 07/15/2020
Generated: 20   5:54 pm 
Comments
 
DCP- Discharge Planning
 
Updated by [id number]: Jaci Pavon on 20   2:50 pm CT
Patient Name: MIREYA BENOIT                                     
Admission Status: ER   
Accout number: [id number]                              
Admission Date: 2020   
: 1960                                                        
Admission Diagnosis:METABOLIC ENCEPHALOPATHY   
Attending: JOAQUINA BARFIELD                                                
Current LOS:  4   
  
Anticipated DC Date:    
Planned Disposition: Home or Self Care   
Primary Insurance: [employer]   
  
  
Discharge Planning Comments:   
  
CM met with patient to complete initial dc planning assessment.  CM educated 
patient on the CM role and verbal consent given by patient to complete 
assessment.   Patient lives at home with her daughter and  roommate where she 
 
states she is independent with her care.  At discharge patient plans to 
return home and feels this is a safe discharge. Patient plans to take a UBER 
home.   CM discussed availability of home health, rehab services, and medical 
equipment. Patient did not want home health, refusal signed. IMM served and 
explained.  Patient has a CPAP, walker, cane and shower chair at home. 
Patient denied known discharge needs at this time. CM will continue to follow 
and will assist as needed with dc plans/needs.    
  
  
  
  
: Jaci Pavon
 DCPIA - Discharge Planning Initial Assessment
 
Updated by [email or web address]: Jaci Pavon on 20   3:47 pm
*  Is the patient Alert and Oriented?
Yes
*  How many steps to enter\exit or inside your home? 8/10 *  PCP [facility] *  Pharmacy
[facility]
*  Preadmission Environment
Home with Family
*  ADLs
Independent
*  Equipment
Cane
CPAP
Rolling Walker
Shower Chair
*  List name and contact numbers for known caregivers / representatives who 
currently or will assist patient after discharge:
RAUDEL ( DAUGHTER) [phone]
*  Verbal permission to speak to the caregivers and representatives has been 
obtained from the patient.
N/A
*  Community resources currently utilized
None
*  Additional services required to return to the preadmission environment?
No
*  Can the patient safely return to the preadmission environment?
Yes
*  Has this patient been hospitalized within the prior 30 days at any 
hospital?
No
 
 
 
 
 
Coverage Notice
 
Reviewer: [id number] Obdulio Butler
 
 
Notice Issued Date-Time: 07/15/2020  17:24
Notice Type: Medicare Outpatient Observation Notice
 
Notice Delivered To: Patient
Relationship to Patient: 
Representative Name: 
 
Delivery Method: HAND - Hand Delivered
Elba Days:
Prior Verbal Notification: 
 
Recipient Understood Notice: Yes
Recipient Signature: Yes
Med Rec Note Co-signed by Attending:
 
Coverage Notice Comment:  
Reviewer: [email or web address] Obdulio Pavon
 
Notice Issued Date-Time: 2020  15:30
Notice Type: IM Discharge Notice
 
Notice Delivered To: Patient
Relationship to Patient: 
Representative Name: 
 
Delivery Method: HAND - Hand Delivered
Elba Days:
Prior Verbal Notification: 
 
Recipient Understood Notice: Yes
Recipient Signature: Yes
Med Rec Note Co-signed by Attending:
 
Coverage Notice Comment:  
Reviewer: [id number] Pavon
 
Notice Issued Date-Time: 2020  15:30
Notice Type: Patient Choice Letter
 
Notice Delivered To: Patient
Relationship to Patient: 
Representative Name: 
 
Delivery Method: HAND - Hand Delivered
Elba Days:
Prior Verbal Notification: 
 
Recipient Understood Notice: Yes
Recipient Signature: Yes
Med Rec Note Co-signed by Attending:
 
Coverage Notice Comment:  refusal of hh
 
 
Last DP export: 20   2:57 p
Patient Name: MIREYA BENOIT
Encounter #: [id number]
Page [phone]
 
 
 
 
 
Electronically Signed by TEO FIELDS on 20 at 1654
 
 
 
 
 
 
**All edits/amendments must be made on the electronic document**
 
DICTATION DATE: 20     : ROSALES  20     
RPT#: [id number]                                DC DATE:20
                                               STATUS: DIS IN  
[address] [place] [address]
***END OF REPORT***

## 2020-08-05 ENCOUNTER — HOSPITAL ENCOUNTER (INPATIENT)
Dept: HOSPITAL 84 - D.ER | Age: 60
LOS: 2 days | Discharge: HOME | DRG: 683 | End: 2020-08-07
Attending: FAMILY MEDICINE | Admitting: FAMILY MEDICINE
Payer: COMMERCIAL

## 2020-08-05 VITALS
HEIGHT: 68 IN | HEIGHT: 68 IN | WEIGHT: 261 LBS | BODY MASS INDEX: 39.56 KG/M2 | BODY MASS INDEX: 39.56 KG/M2 | WEIGHT: 261 LBS | BODY MASS INDEX: 39.56 KG/M2

## 2020-08-05 VITALS — DIASTOLIC BLOOD PRESSURE: 74 MMHG | SYSTOLIC BLOOD PRESSURE: 152 MMHG

## 2020-08-05 VITALS — DIASTOLIC BLOOD PRESSURE: 63 MMHG | SYSTOLIC BLOOD PRESSURE: 120 MMHG

## 2020-08-05 VITALS — SYSTOLIC BLOOD PRESSURE: 121 MMHG | DIASTOLIC BLOOD PRESSURE: 67 MMHG

## 2020-08-05 DIAGNOSIS — E86.0: ICD-10-CM

## 2020-08-05 DIAGNOSIS — E11.40: ICD-10-CM

## 2020-08-05 DIAGNOSIS — R53.1: ICD-10-CM

## 2020-08-05 DIAGNOSIS — H72.92: ICD-10-CM

## 2020-08-05 DIAGNOSIS — N17.9: Primary | ICD-10-CM

## 2020-08-05 DIAGNOSIS — N39.0: ICD-10-CM

## 2020-08-05 DIAGNOSIS — Z91.81: ICD-10-CM

## 2020-08-05 LAB
ALBUMIN SERPL-MCNC: 2.7 G/DL (ref 3.4–5)
ALP SERPL-CCNC: 142 U/L (ref 30–120)
ALT SERPL-CCNC: 17 U/L (ref 10–68)
AMORPHOUS SEDIMENT: (no result) /LPF
ANION GAP SERPL CALC-SCNC: 14 MMOL/L (ref 8–16)
BACTERIA #/AREA URNS HPF: (no result) /HPF
BASOPHILS NFR BLD AUTO: 0.2 % (ref 0–2)
BILIRUB SERPL-MCNC: 0.15 MG/DL (ref 0.2–1.3)
BILIRUB SERPL-MCNC: NEGATIVE MG/DL
BUN SERPL-MCNC: 48 MG/DL (ref 7–18)
CALCIUM SERPL-MCNC: 8.9 MG/DL (ref 8.5–10.1)
CHLORIDE SERPL-SCNC: 98 MMOL/L (ref 98–107)
CO2 SERPL-SCNC: 25.8 MMOL/L (ref 21–32)
CREAT SERPL-MCNC: 3.3 MG/DL (ref 0.6–1.3)
EOSINOPHIL NFR BLD: 2.1 % (ref 0–7)
ERYTHROCYTE [DISTWIDTH] IN BLOOD BY AUTOMATED COUNT: 14.3 % (ref 11.5–14.5)
GLOBULIN SER-MCNC: 3.7 G/L
GLUCOSE SERPL-MCNC: 1000 MG/DL
GLUCOSE SERPL-MCNC: 226 MG/DL (ref 74–106)
HCT VFR BLD CALC: 33.9 % (ref 36–48)
HGB BLD-MCNC: 10.7 G/DL (ref 12–16)
IMM GRANULOCYTES NFR BLD: 0.2 % (ref 0–5)
KETONES UR STRIP-MCNC: NEGATIVE MG/DL
LYMPHOCYTES NFR BLD AUTO: 11.1 % (ref 15–50)
MCH RBC QN AUTO: 29.3 PG (ref 26–34)
MCHC RBC AUTO-ENTMCNC: 31.6 G/DL (ref 31–37)
MCV RBC: 92.9 FL (ref 80–100)
MONOCYTES NFR BLD: 5.1 % (ref 2–11)
NEUTROPHILS NFR BLD AUTO: 81.3 % (ref 40–80)
NITRITE UR-MCNC: NEGATIVE MG/ML
OSMOLALITY SERPL CALC.SUM OF ELEC: 287 MOSM/KG (ref 275–300)
PH UR STRIP: 6 [PH] (ref 5–6)
PLATELET # BLD: 279 10X3/UL (ref 130–400)
PMV BLD AUTO: 10 FL (ref 7.4–10.4)
POTASSIUM SERPL-SCNC: 3.8 MMOL/L (ref 3.5–5.1)
PROT SERPL-MCNC: 6.4 G/DL (ref 6.4–8.2)
RBC # BLD AUTO: 3.65 10X6/UL (ref 4–5.4)
RBC #/AREA URNS HPF: (no result) /HPF (ref 0–5)
SODIUM SERPL-SCNC: 134 MMOL/L (ref 136–145)
SQUAMOUS #/AREA URNS HPF: (no result) /HPF (ref 0–5)
UROBILINOGEN UR-MCNC: NORMAL MG/DL
WBC # BLD AUTO: 13.8 10X3/UL (ref 4.8–10.8)
WBC #/AREA URNS HPF: (no result) /HPF

## 2020-08-06 VITALS — DIASTOLIC BLOOD PRESSURE: 55 MMHG | SYSTOLIC BLOOD PRESSURE: 107 MMHG

## 2020-08-06 VITALS — DIASTOLIC BLOOD PRESSURE: 44 MMHG | SYSTOLIC BLOOD PRESSURE: 109 MMHG

## 2020-08-06 VITALS — DIASTOLIC BLOOD PRESSURE: 56 MMHG | SYSTOLIC BLOOD PRESSURE: 110 MMHG

## 2020-08-06 VITALS — DIASTOLIC BLOOD PRESSURE: 63 MMHG | SYSTOLIC BLOOD PRESSURE: 110 MMHG

## 2020-08-06 VITALS — SYSTOLIC BLOOD PRESSURE: 131 MMHG | DIASTOLIC BLOOD PRESSURE: 53 MMHG

## 2020-08-06 VITALS — SYSTOLIC BLOOD PRESSURE: 144 MMHG | DIASTOLIC BLOOD PRESSURE: 72 MMHG

## 2020-08-06 LAB
ANION GAP SERPL CALC-SCNC: 13.1 MMOL/L (ref 8–16)
BASOPHILS NFR BLD AUTO: 1 % (ref 0–2)
BUN SERPL-MCNC: 43 MG/DL (ref 7–18)
CALCIUM SERPL-MCNC: 8.1 MG/DL (ref 8.5–10.1)
CHLORIDE SERPL-SCNC: 101 MMOL/L (ref 98–107)
CO2 SERPL-SCNC: 22.7 MMOL/L (ref 21–32)
CREAT SERPL-MCNC: 2.2 MG/DL (ref 0.6–1.3)
ERYTHROCYTE [DISTWIDTH] IN BLOOD BY AUTOMATED COUNT: 14.2 % (ref 11.5–14.5)
GLUCOSE SERPL-MCNC: 151 MG/DL (ref 74–106)
HCT VFR BLD CALC: 30 % (ref 36–48)
HGB BLD-MCNC: 9.6 G/DL (ref 12–16)
LYMPHOCYTES NFR BLD AUTO: 22 % (ref 15–50)
MCH RBC QN AUTO: 29.2 PG (ref 26–34)
MCHC RBC AUTO-ENTMCNC: 32 G/DL (ref 31–37)
MCV RBC: 91.2 FL (ref 80–100)
NEUTROPHILS NFR BLD AUTO: 77 % (ref 40–80)
OSMOLALITY SERPL CALC.SUM OF ELEC: 279 MOSM/KG (ref 275–300)
PLATELET # BLD EST: NORMAL 10*3/UL
PLATELET # BLD: 252 10X3/UL (ref 130–400)
PMV BLD AUTO: 10.3 FL (ref 7.4–10.4)
POTASSIUM SERPL-SCNC: 3.8 MMOL/L (ref 3.5–5.1)
RBC # BLD AUTO: 3.29 10X6/UL (ref 4–5.4)
SODIUM SERPL-SCNC: 133 MMOL/L (ref 136–145)
WBC # BLD AUTO: 11.1 10X3/UL (ref 4.8–10.8)

## 2020-08-06 NOTE — HP
PATIENT: MIREYA BENOIT                              MEDICAL RECORD: [id number]
ACCOUNT: [id number]                                    LOCATION:D.MS [id number]
: 64                                            ADMISSION DATE: 20
                                                         PCP: No PCP                 
 
                             HISTORY AND PHYSICAL EXAMINATION
 
 
CHIEF COMPLAINT:  Frequent falling and acute renal failure.
 
HISTORY OF PRESENT ILLNESS:  This is a 59-year-old morbidly obese female
followed by Dr. Ward, who has had frequent falls recently.  She fell last
night and this morning, she got up and felt real good, went to the bathroom, sat
on the commode.  Apparently, the commode seat slipped and she fell and hit her
left ear and left knee on the bathtub, also has other aches and pains on elbow
and hip.  She reports a decreased p.o. intake, but states she has been drinking
plenty of fluids.  In the Emergency Department, her lactic acid level was 5.2
initially.  Urine was yellow and clear with 3+ protein, few bacteria, 5-10 white
blood cells.  Her CBC showed a white count of 13,800, hemoglobin 10.7.  Basic
metabolic panel is okay except glucose was 226, BUN 48, and creatinine 3.3
(baseline creatinine around 1.7 to 1.9).  Liver functions were normal.  CT of
the head and spine showed nothing acute.  X-rays of the hip, elbow, chest, and
knee showed no acute fractures.  She is admitted for frequent falls and
dehydration with acute kidney injury.
 
PAST MEDICAL HISTORY:  Diabetes with neuropathy, hypertension, morbid obesity,
metabolic encephalopathy.
 
PAST SURGICAL HISTORY:  Cholecystectomy, hysterectomy, total knee replacement. 
She has had toe amputations.  She has had bilateral carpal tunnel release,
trigger finger release,  times 2, anterior cervical fusion, and
exploratory surgeries.
 
ALLERGIES:  CODEINE.
 
HOME MEDICATIONS:  Lantus 60 units in the morning and 100 units in the evening,
gabapentin 600 mg t.i.d., aspirin 81 mg at bedtime, citalopram 40 mg at bedtime,
atorvastatin 20 mg once a day, Jardiance 10 mg once a day, Mag-Ox 500 mg once a
day, allopurinol 300 mg once a day, venlafaxine 75 mg once a day, carvedilol
6.25 mg twice a day, lisinopril 10 mg once a day, lactulose 2 table spoons once
a day, potassium K-Dur 20 mEq once a day, Norco 10 four times a day.
 
ALLERGIES:  CODEINE.
 
SOCIAL HISTORY:  She lives with family.
 
HABITS:  No tobacco, alcohol or drugs.
 
REVIEW OF SYSTEMS:
GENERAL:  No major weight changes.
HEENT:  No particular sinus or allergy problems.
RESPIRATORY:  No history of emphysema or asthma.
CARDIAC:  No known coronary disease.
GASTROINTESTINAL:  She has had some heartburn.
GENITOURINARY:  Occasional urinary tract infections.
MUSCULOSKELETAL:  Has chronic back and neck pains.
NEUROLOGIC:  She has had metabolic encephalopathy.  No seizures.  No migraines.
PSYCHIATRIC:  She has depression.
 
 
 
HISTORY AND PHYSICAL                           [id number]    MIREYA BENOIT      
 
 
 
PHYSICAL EXAMINATION:
VITAL SIGNS:  Temperature 98.6, pulse 87, respirations 20, blood pressure
152/74, and O2 sat 96%.
GENERAL:  Morbidly obese white female, does not appear in acute distress.  She
is awake and alert.
SKIN:  Warm and dry.
HEENT:  Grossly within normal limits.
NECK:  Supple.  No JVD or bruit.
HEART:  Regular rate and rhythm.
LUNGS:  Fairly clear.
ABDOMEN:  Soft, flat, nontender.
EXTREMITIES:  No significant pitting edema.
 
LABORATORY DATA:  Lactic acid initially was 5.2, now down to 1.1.  Urinalysis;
trace blood, 3+ protein, trace leukocyte esterase, few bacteria, and 5-10 white
blood cells.  CBC with a white count of 13,800, hemoglobin 10.7, hematocrit
33.9.  Basic metabolic panel:  Sodium 134, potassium 3.8, chloride 98, CO2 25.8,
BUN 48, creatinine 3.3, glucose 226, calcium 8.9.  Liver functions were normal.
 
DIAGNOSTIC DATA:  EKG normal sinus rhythm.  X-ray of the hip, elbow, chest, and
knee showed no acute fracture is seen.  CT of the head showed no acute
abnormalities seen.  CT of the cervical spine did not show anything acute.
 
ASSESSMENT:
1.  Weakness with frequent falls.
2.  Acute renal insufficiency.
3.  Diabetes with peripheral neuropathy
4.  History of encephalopathy.
 
PLAN:  Blood cultures and urine cultures have been done.  Started on IV fluids
and IV antibiotics.  We will check an ammonia level tomorrow.  Other tests or
procedures as warranted.
 
TRANSINT:[id number] Voice Confirmation ID: [id number] DOCUMENT ID: [id number]
 
 
                                           
                                           YECENIA IQBAL MD              
 
 
 
Electronically Signed by YECENIA IQBAL on 20 at 1308
 
 
 
 
CC:                                                             [id number]
DICTATION DATE: 20 2251     :     20 0935      ADM IN  
                                                                              
[facility]                                          
[address] [id number]

## 2020-08-06 NOTE — NUR
ALERT AND ORIENTED X3. DENIES ANY PAIN OR DISCOMFORT AT THIS TIME. IVF
INFUSING AT PRESCRIBEDRATE TO LEFT F/A WITH NO S/S OF INFECTION/INFILTRATION.
DEVINE CATH PATENT WITH CLEAR GUSTAVO URINE NOTED. BED ALARM IN PLACE AT THIS
TIME AND ENCOURAGED TO USE CALL LIGHT FOR ASSSIT.

## 2020-08-06 NOTE — NUR
DR. IQBAL'S OFFICE CALLED REGARDING PATIENT BLOOD SUGAR  W/O SLIDING
SCALE OR BLOOD SUGAR MONITORING. STATED WOULD PUT IN ORDERS LATER.

## 2020-08-06 NOTE — NUR
IV DISCONTINUED AND VERBALIZED UNDERSTANDING OF DISCHARGE INSTRUCTIONS. STABLE
AT TIME OF DISCHARGE.

## 2020-08-06 NOTE — NUR
. SCD'S ON WITH NO S/S OF DVT NOTED. PERCOCET GIVEN FOR 7/10 BACK PAIN.
ENCOURAGED TO USE CALL LIGHT FOR ASSIST.

## 2020-08-07 VITALS — DIASTOLIC BLOOD PRESSURE: 54 MMHG | SYSTOLIC BLOOD PRESSURE: 124 MMHG

## 2020-08-07 VITALS — SYSTOLIC BLOOD PRESSURE: 137 MMHG | DIASTOLIC BLOOD PRESSURE: 71 MMHG

## 2020-08-07 VITALS — SYSTOLIC BLOOD PRESSURE: 130 MMHG | DIASTOLIC BLOOD PRESSURE: 52 MMHG

## 2020-08-07 LAB
ANION GAP SERPL CALC-SCNC: 10.5 MMOL/L (ref 8–16)
BASOPHILS NFR BLD AUTO: 0.3 % (ref 0–2)
BUN SERPL-MCNC: 36 MG/DL (ref 7–18)
CALCIUM SERPL-MCNC: 7.6 MG/DL (ref 8.5–10.1)
CHLORIDE SERPL-SCNC: 107 MMOL/L (ref 98–107)
CO2 SERPL-SCNC: 24.3 MMOL/L (ref 21–32)
CREAT SERPL-MCNC: 1.8 MG/DL (ref 0.6–1.3)
EOSINOPHIL NFR BLD: 4.7 % (ref 0–7)
ERYTHROCYTE [DISTWIDTH] IN BLOOD BY AUTOMATED COUNT: 14.5 % (ref 11.5–14.5)
GLUCOSE SERPL-MCNC: 99 MG/DL (ref 74–106)
HCT VFR BLD CALC: 28.2 % (ref 36–48)
HGB BLD-MCNC: 8.9 G/DL (ref 12–16)
IMM GRANULOCYTES NFR BLD: 0.3 % (ref 0–5)
LYMPHOCYTES NFR BLD AUTO: 32.6 % (ref 15–50)
MCH RBC QN AUTO: 29 PG (ref 26–34)
MCHC RBC AUTO-ENTMCNC: 31.6 G/DL (ref 31–37)
MCV RBC: 91.9 FL (ref 80–100)
MONOCYTES NFR BLD: 10.1 % (ref 2–11)
NEUTROPHILS NFR BLD AUTO: 52 % (ref 40–80)
OSMOLALITY SERPL CALC.SUM OF ELEC: 283 MOSM/KG (ref 275–300)
PLATELET # BLD: 239 10X3/UL (ref 130–400)
PMV BLD AUTO: 9.7 FL (ref 7.4–10.4)
POTASSIUM SERPL-SCNC: 3.8 MMOL/L (ref 3.5–5.1)
RBC # BLD AUTO: 3.07 10X6/UL (ref 4–5.4)
SODIUM SERPL-SCNC: 138 MMOL/L (ref 136–145)
WBC # BLD AUTO: 6.3 10X3/UL (ref 4.8–10.8)

## 2020-08-07 NOTE — MORECARE
CASE MANAGEMENT DISCHARGE SUMMARY
 
 
PATIENT: MIREYA BENOIT                  UNIT: [id number]
ACCOUNT#: [id number]                       ADM DATE: 20
AGE: 59     : 64  SEX: F            ROOM/BED: D.2200    
AUTHOR: DIAMONDDOC                             PHYSICIAN:                               
 
REFERRING PHYSICIAN: YECENIA IQBAL MD              
DATE OF SERVICE: 20
Discharge Plan
 
 
Patient Name: MIREYA BENOIT
Facility: [facility]:[place]
Encounter #: [id number]
Medical Record #: [id number]
: 1960
Planned Disposition: Home or Self Care
Anticipated Discharge Date: 
 
Discharge Date: 
Expected LOS: 
Initial Reviewer: [id number]
Initial Review Date: 2020
Generated: 20   2:09 pm 
Comments
 
DCP- Discharge Planning
 
Updated by [email or web address]: Jaci Pavon on 20  12:07 pm CT
Patient Name: MIREYA BENOIT                                     
Admission Status: ER   
Accout number: [id number]                              
Admission Date: 2020   
: 1960                                                        
Admission Diagnosis:   
Attending: YECENIA IQBAL                                                
Current LOS:  2   
  
Anticipated DC Date:    
Planned Disposition: Home or Self Care   
Primary Insurance: [employer]   
  
  
Discharge Planning Comments:   
  
  
CM met with patient to complete initial dc planning assessment.  CM educated 
patient on the CM role and verbal consent given by patient to complete 
 
assessment.   Patient lives at home with her daughter where she is 
independent with her care.  At discharge patient plans to return home and 
feels this is a safe discharge.  CM discussed availability of home health, 
rehab services, and medical equipment. She stated that last time she was here 
she was in a different mind set and now she wants to get better. She stated 
that her daughter Franci are going to be moving to [place] next month. Patient has a 
cane, rolling walker and shower chair and CPAP.   Patient denied known 
discharge needs at this time. CM will continue to follow and will assist as 
needed with dc plans/needs.    
  
  
  
: Jcai Pavon
 DCPIA - Discharge Planning Initial Assessment
 
Updated by [email or web address]: Jaci Pavon on 20  12:57 pm
*  Is the patient Alert and Oriented?
Yes
*  How many steps to enter\exit or inside your home? 8/10 *  PCP [facility] *  Pharmacy
[facility]
*  Preadmission Environment
Home with Family
*  ADLs
Independent
*  Equipment
Cane
CPAP
Rolling Walker
Shower Chair
*  List name and contact numbers for known caregivers / representatives who 
currently or will assist patient after discharge:
FRANCI [phone]
*  Verbal permission to speak to the caregivers and representatives has been 
obtained from the patient.
Yes
*  Community resources currently utilized
None
*  Additional services required to return to the preadmission environment?
No
*  Can the patient safely return to the preadmission environment?
Yes
*  Has this patient been hospitalized within the prior 30 days at any 
hospital?
Yes
 
 
 
 
 
 
 
Last DP export: 20  12:01 pm
 
Patient Name: MIREYA BENOIT
Encounter #: [id number]
Page [id number]
 
 
 
 
 
Electronically Signed by TEO FIELDS on 20 at 1309
 
 
 
 
 
 
**All edits/amendments must be made on the electronic document**
 
DICTATION DATE: 20 1307     : ROSALES  20 1309     
RPT#: [id number]                                DC DATE:        
                                               STATUS: ADM IN  
[facility]
 [place] [address]
***END OF REPORT***

## 2020-08-07 NOTE — NUR
PT RESTING QUIETLY IN BED.  RESP EVEN AND UNLABORED.  REPORTS PAIN 8/10 AT
THIS TIME, PAIN MEDICATION TO BE ADMINISTERED.  IV TO LEFT FOREARM WITH NS @
100ML/HR INFUSING VIA PUMP.  SITE WITHOUT REDNESS OR EDEMA.  F/C PATENT TO
GRAVITY, DRAINING YELLOW URINE.  DENIES FURTHER NEEDS AT THIS TIME.  CL WITHIN
REACH.  ENCOURAGED TO CALL WITH NEEDS.  CONTINUE POC

## 2020-08-07 NOTE — NUR
ASSESSED AT THE BEGINNING OF THE SHIFT. PT IS ALERT AND ORIENTED, AABLE TO
VERBALIZE NEEDS. SHE WAS ASSISTED UP TO THE BATHROOM TO SHOWER AND HAD HER
LINENS CHANGED EARY IN THE EVENING. SHE HAS A DEVINE AND HAS BEEN C/O BACK AND
KNEE PAIN EARLIER BUT AFTER HER ONE PAIN MED SHE HAS NOT REQUESTED AGAIN. SHE
DID SAY SHE HAD SLEPT TOO MUCH DURING THE DAY AND WAS NOW AWAKE WATCHING TV.

## 2020-08-07 NOTE — MORECARE
CASE MANAGEMENT DISCHARGE SUMMARY
 
 
PATIENT: MIREYA BENOIT                  UNIT: [id number]
ACCOUNT#: [id number]                       ADM DATE: 20
AGE: 59     : 64  SEX: F            ROOM/BED: D.2207    
AUTHOR: TEO FIELDS                             PHYSICIAN:                               
 
REFERRING PHYSICIAN: YECENIA IQBAL MD              
DATE OF SERVICE: 20
Discharge Plan
 
 
Patient Name: MIREYA BENOIT
Facility: [facility]:[place]
Encounter #: [id number]
Medical Record #: [id number]
: 1960
Planned Disposition: Home or Self Care
Anticipated Discharge Date: 
 
Discharge Date: 
Expected LOS: 
Initial Reviewer: [email or web address]
Initial Review Date: 2020
Generated: 20   2:01 pm 
 DCPIA - Discharge Planning Initial Assessment
 
Updated by [email or web address]: Jaci Pavon on 20  12:57 pm
*  Is the patient Alert and Oriented?
Yes
*  How many steps to enter\exit or inside your home? 8/10 *  PCP [facility] *  Pharmacy
[facility]
*  Preadmission Environment
Home with Family
*  ADLs
Independent
*  Equipment
Cane
CPAP
Rolling Walker
Shower Chair
*  List name and contact numbers for known caregivers / representatives who 
currently or will assist patient after discharge:
RAUDEL [phone]
*  Verbal permission to speak to the caregivers and representatives has been 
obtained from the patient.
Yes
*  Community resources currently utilized
None
 
*  Additional services required to return to the preadmission environment?
No
*  Can the patient safely return to the preadmission environment?
Yes
*  Has this patient been hospitalized within the prior 30 days at any 
hospital?
Yes
 
 
 
 
 
 
 
Last DP export: 20  11:52 am
Patient Name: MIREYA BENOIT
Encounter #: [id number]
Page [phone]
 
 
 
 
 
Electronically Signed by TEO FIELDS on 20 at 1301
 
 
 
 
 
 
**All edits/amendments must be made on the electronic document**
 
DICTATION DATE: 20 1301     : ROSALES  20 1301     
RPT#: [id number]                                DC DATE:        
                                               STATUS: ADM IN  
[facility]
[address] [place] [address]
***END OF REPORT***

## 2020-08-07 NOTE — MORECARE
CASE MANAGEMENT DISCHARGE SUMMARY
 
 
PATIENT: MIREYA BENOIT                  UNIT: [id number]
ACCOUNT#: [id number]                       ADM DATE: 20
AGE: 59     : 64  SEX: F            ROOM/BED: D.2207    
AUTHOR: TEO FIELDS                             PHYSICIAN:                               
 
REFERRING PHYSICIAN: YECENIA IQBAL MD              
DATE OF SERVICE: 20
Discharge Plan
 
 
Patient Name: MIREYA BENOIT
Facility: [facility]:[place]
Encounter #: [id number]
Medical Record #: [id number]
: 1960
Planned Disposition: Home or Self Care
Anticipated Discharge Date: 
 
Discharge Date: 
Expected LOS: 
Initial Reviewer: [email or web address]
Initial Review Date: 2020
Generated: 20   1:51 pm 
  
 
 
 
 
 
 
 
Patient Name: MIREYA BENOIT
 
Encounter #: [id number]
Page [phone]
 
 
 
 
 
Electronically Signed by TEO FIELDS on 20 at 1252
 
 
 
 
 
 
**All edits/amendments must be made on the electronic document**
 
DICTATION DATE: 20 1251     : ROSALES  20 1251     
RPT#: [id number]                                DC DATE:        
                                               STATUS: ADM IN  
[facility]
[address] [place] [address]
***END OF REPORT***

## 2020-08-08 NOTE — MORECARE
CASE MANAGEMENT DISCHARGE SUMMARY
 
 
PATIENT: MIREYA BENOIT                  UNIT: [id number]
ACCOUNT#: [id number]                       ADM DATE: 20
AGE: 59     : 64  SEX: F            ROOM/BED: D.9675    
AUTHOR: TEO FIELDS                             PHYSICIAN:                               
 
REFERRING PHYSICIAN: YECENIA IQBAL MD              
DATE OF SERVICE: 20
Discharge Plan
 
 
Patient Name: MIREYA BENOIT
Facility: [facility]:[place]
Encounter #: [id number]
Medical Record #: [id number]
: 1960
Planned Disposition: Home or Self Care
Anticipated Discharge Date: 
 
Discharge Date: 2020
Expected LOS: 
Initial Reviewer: [email or web address]
Initial Review Date: 2020
Generated: 20   2:09 pm 
Comments
 
DCP- Discharge Planning
 
Updated by [email or web address]: Jaci Pavon on 20  12:07 pm CT
Patient Name: MIREYA BENOIT                                     
Admission Status: ER   
Accout number: [id number]                              
Admission Date: 2020   
: 1960                                                        
Admission Diagnosis:   
Attending: YECENIA IQBAL                                                
Current LOS:  2   
  
Anticipated DC Date:    
Planned Disposition: Home or Self Care   
Primary Insurance: [employer]   
  
  
Discharge Planning Comments:   
  
  
CM met with patient to complete initial dc planning assessment.  CM educated 
patient on the CM role and verbal consent given by patient to complete 
 
assessment.   Patient lives at home with her daughter where she is 
independent with her care.  At discharge patient plans to return home and 
feels this is a safe discharge.  CM discussed availability of home health, 
rehab services, and medical equipment. She stated that last time she was here 
she was in a different mind set and now she wants to get better. She stated 
that her daughter Franci are going to be moving to [place] next month. Patient has a 
cane, rolling walker and shower chair and CPAP.   Patient denied known 
discharge needs at this time. CM will continue to follow and will assist as 
needed with dc plans/needs.    
  
  
  
: Jaci Pavon
 DCPIA - Discharge Planning Initial Assessment
 
Updated by [id number]: Jaci Pavon on 20  12:57 pm
*  Is the patient Alert and Oriented?
Yes
*  How many steps to enter\exit or inside your home? 8/10 *  [facility] *  Pharmacy
[facility]
*  Preadmission Environment
Home with Family
*  ADLs
Independent
*  Equipment
Cane
CPAP
Rolling Walker
Shower Chair
*  List name and contact numbers for known caregivers / representatives who 
currently or will assist patient after discharge:
FRANCI [phone]
*  Verbal permission to speak to the caregivers and representatives has been 
obtained from the patient.
Yes
*  Community resources currently utilized
None
*  Additional services required to return to the preadmission environment?
No
*  Can the patient safely return to the preadmission environment?
Yes
*  Has this patient been hospitalized within the prior 30 days at any 
hospital?
Yes
 
 
 
 
 
 
 
Last DP export: 20  12:09 pm
 
Patient Name: MIREYA BENOIT
Encounter #: [id number]
Page [phone]
 
 
 
 
 
Electronically Signed by TEO FIELDS on 20 at 1310
 
 
 
 
 
 
**All edits/amendments must be made on the electronic document**
 
DICTATION DATE: 20 1309     : ROSALES  20 1309     
RPT#: [id number]                                DC DATE:20
                                               STATUS: DIS IN  
[facility]
 [place] [address]
***END OF REPORT***

## 2020-08-14 LAB — HBA1C MFR BLD HPLC: 8.7 %

## 2020-08-24 ENCOUNTER — HOSPITAL ENCOUNTER (EMERGENCY)
Facility: HOSPITAL | Age: 60
Discharge: HOME | End: 2020-08-24
Attending: EMERGENCY MEDICINE | Admitting: EMERGENCY MEDICINE
Payer: MEDICARE

## 2020-08-24 VITALS
BODY MASS INDEX: 39.99 KG/M2 | WEIGHT: 263.9 LBS | OXYGEN SATURATION: 98 % | SYSTOLIC BLOOD PRESSURE: 125 MMHG | HEART RATE: 87 BPM | TEMPERATURE: 98.8 F | HEIGHT: 68 IN | RESPIRATION RATE: 18 BRPM | DIASTOLIC BLOOD PRESSURE: 67 MMHG

## 2020-08-24 DIAGNOSIS — L03.011 CELLULITIS OF FINGER OF RIGHT HAND: Primary | ICD-10-CM

## 2020-08-24 LAB
ALBUMIN SERPL BCP-MCNC: 3.5 G/DL (ref 3.4–4.8)
ALP SERPL-CCNC: 121.1 U/L (ref 35–140)
ALT SERPL W P-5'-P-CCNC: 9 U/L (ref 5–54)
ANION GAP SERPL CALCULATED.3IONS-SCNC: 10 MMOL/L (ref 4–13)
AST SERPL W P-5'-P-CCNC: 12 U/L (ref 15–41)
BASOPHILS # BLD AUTO: 0.04 THOUSANDS/ΜL (ref 0–0.1)
BASOPHILS NFR BLD AUTO: 0 % (ref 0–1)
BILIRUB SERPL-MCNC: 0.44 MG/DL (ref 0.3–1.2)
BUN SERPL-MCNC: 25 MG/DL (ref 6–20)
CALCIUM SERPL-MCNC: 9.1 MG/DL (ref 8.4–10.2)
CHLORIDE SERPL-SCNC: 102 MMOL/L (ref 96–108)
CO2 SERPL-SCNC: 26 MMOL/L (ref 22–33)
CREAT SERPL-MCNC: 1.64 MG/DL (ref 0.4–1.1)
CRP SERPL QL: 6.9 MG/L (ref 0–1)
EOSINOPHIL # BLD AUTO: 0.13 THOUSAND/ΜL (ref 0–0.61)
EOSINOPHIL NFR BLD AUTO: 1 % (ref 0–6)
ERYTHROCYTE [DISTWIDTH] IN BLOOD BY AUTOMATED COUNT: 14.3 % (ref 11.6–15.1)
ERYTHROCYTE [SEDIMENTATION RATE] IN BLOOD: 52 MM/HOUR (ref 0–30)
GFR SERPL CREATININE-BSD FRML MDRD: 34 ML/MIN/1.73SQ M
GLUCOSE SERPL-MCNC: 196 MG/DL (ref 65–140)
HCT VFR BLD AUTO: 34.5 % (ref 34.8–46.1)
HGB BLD-MCNC: 11.3 G/DL (ref 11.5–15.4)
IMM GRANULOCYTES # BLD AUTO: 0.05 THOUSAND/UL (ref 0–0.2)
IMM GRANULOCYTES NFR BLD AUTO: 0 % (ref 0–2)
LYMPHOCYTES # BLD AUTO: 2.3 THOUSANDS/ΜL (ref 0.6–4.47)
LYMPHOCYTES NFR BLD AUTO: 18 % (ref 14–44)
MCH RBC QN AUTO: 29.7 PG (ref 26.8–34.3)
MCHC RBC AUTO-ENTMCNC: 32.8 G/DL (ref 31.4–37.4)
MCV RBC AUTO: 91 FL (ref 82–98)
MONOCYTES # BLD AUTO: 1.04 THOUSAND/ΜL (ref 0.17–1.22)
MONOCYTES NFR BLD AUTO: 8 % (ref 4–12)
NEUTROPHILS # BLD AUTO: 9.3 THOUSANDS/ΜL (ref 1.85–7.62)
NEUTS SEG NFR BLD AUTO: 73 % (ref 43–75)
PLATELET # BLD AUTO: 339 THOUSANDS/UL (ref 149–390)
PMV BLD AUTO: 9.5 FL (ref 8.9–12.7)
POTASSIUM SERPL-SCNC: 4.4 MMOL/L (ref 3.5–5)
PROT SERPL-MCNC: 6.5 G/DL (ref 6.4–8.3)
RBC # BLD AUTO: 3.81 MILLION/UL (ref 3.81–5.12)
SODIUM SERPL-SCNC: 138 MMOL/L (ref 133–145)
WBC # BLD AUTO: 12.86 THOUSAND/UL (ref 4.31–10.16)

## 2020-08-24 PROCEDURE — 87147 CULTURE TYPE IMMUNOLOGIC: CPT | Performed by: EMERGENCY MEDICINE

## 2020-08-24 PROCEDURE — 99284 EMERGENCY DEPT VISIT MOD MDM: CPT | Performed by: EMERGENCY MEDICINE

## 2020-08-24 PROCEDURE — 87070 CULTURE OTHR SPECIMN AEROBIC: CPT | Performed by: EMERGENCY MEDICINE

## 2020-08-24 PROCEDURE — 87186 SC STD MICRODIL/AGAR DIL: CPT | Performed by: EMERGENCY MEDICINE

## 2020-08-24 PROCEDURE — 85652 RBC SED RATE AUTOMATED: CPT | Performed by: EMERGENCY MEDICINE

## 2020-08-24 PROCEDURE — 85025 COMPLETE CBC W/AUTO DIFF WBC: CPT | Performed by: EMERGENCY MEDICINE

## 2020-08-24 PROCEDURE — 80053 COMPREHEN METABOLIC PANEL: CPT | Performed by: EMERGENCY MEDICINE

## 2020-08-24 PROCEDURE — 99283 EMERGENCY DEPT VISIT LOW MDM: CPT

## 2020-08-24 PROCEDURE — 36415 COLL VENOUS BLD VENIPUNCTURE: CPT | Performed by: EMERGENCY MEDICINE

## 2020-08-24 PROCEDURE — 86140 C-REACTIVE PROTEIN: CPT | Performed by: EMERGENCY MEDICINE

## 2020-08-24 PROCEDURE — 87205 SMEAR GRAM STAIN: CPT | Performed by: EMERGENCY MEDICINE

## 2020-08-24 PROCEDURE — 96365 THER/PROPH/DIAG IV INF INIT: CPT

## 2020-08-24 PROCEDURE — 96361 HYDRATE IV INFUSION ADD-ON: CPT

## 2020-08-24 RX ORDER — DOXYCYCLINE HYCLATE 100 MG/1
100 CAPSULE ORAL ONCE
Status: DISCONTINUED | OUTPATIENT
Start: 2020-08-24 | End: 2021-06-04

## 2020-08-24 RX ORDER — INSULIN GLARGINE 100 [IU]/ML
40 INJECTION, SOLUTION SUBCUTANEOUS 2 TIMES DAILY
COMMUNITY
End: 2020-11-10 | Stop reason: SDUPTHER

## 2020-08-24 RX ORDER — LISINOPRIL 10 MG/1
10 TABLET ORAL DAILY
COMMUNITY
End: 2020-11-10 | Stop reason: SDUPTHER

## 2020-08-24 RX ORDER — HYDROCODONE BITARTRATE AND ACETAMINOPHEN 5; 325 MG/1; MG/1
1 TABLET ORAL ONCE
Status: COMPLETED | OUTPATIENT
Start: 2020-08-24 | End: 2020-08-24

## 2020-08-24 RX ADMIN — HYDROCODONE BITARTRATE AND ACETAMINOPHEN 1 TABLET: 5; 325 TABLET ORAL at 17:14

## 2020-08-24 RX ADMIN — Medication 100 MG: at 18:46

## 2020-08-24 RX ADMIN — SODIUM CHLORIDE 1000 ML: 0.9 INJECTION, SOLUTION INTRAVENOUS at 17:14

## 2020-08-24 NOTE — ED PROVIDER NOTES
History  Chief Complaint   Patient presents with    Finger Swelling     [facility] of right 2nd digit cellulitis and swelling with pain radiating to right mid forearm     51-year-old female presenting with right finger redness and discharge  Patient cut her finger approximately 2 weeks ago  Since then, the redness and discharged under the nail bed has gotten worse  The nail did fall off  She denies fevers  She states she has a history of chronic kidney disease and diabetes  She went to urgent care for evaluation and they sent her to the emergency department          Prior to Admission Medications   Prescriptions Last Dose Informant Patient Reported? Taking?   insulin glargine (LANTUS) 100 units/mL subcutaneous injection 8/24/2020 at Unknown time  Yes Yes   Sig: Inject 60 Units under the skin 2 (two) times a day 60 units AM, 100 units PM   insulin lispro (HumaLOG) 100 units/mL injection 8/24/2020 at Unknown time  Yes Yes   Sig: Inject under the skin 3 (three) times a day before meals Between 10-20 units, sliding scale  lisinopril (ZESTRIL) 10 mg tablet 8/24/2020 at Unknown time  Yes Yes   Sig: Take 10 mg by mouth daily   magnesium oxide (MAG-OX) 400 mg 8/24/2020 at Unknown time  Yes Yes   Sig: Take 400 mg by mouth 2 (two) times a day      Facility-Administered Medications: None       Past Medical History:   Diagnosis Date    Anemia     Diabetes mellitus ([address] )     Hypertension     Renal disorder        History reviewed  No pertinent surgical history  History reviewed  No pertinent family history  I have reviewed and agree with the history as documented  E-Cigarette/Vaping    E-Cigarette Use Never User      E-Cigarette/Vaping Substances     Social History     Tobacco Use    Smoking status: Former Smoker    Smokeless tobacco: Never Used   Substance Use Topics    Alcohol use: Not Currently    Drug use: Never       Review of Systems   Constitutional: Negative for fever     HENT: Negative for congestion  Eyes: Negative for visual disturbance  Respiratory: Negative for shortness of breath  Cardiovascular: Negative for chest pain  Gastrointestinal: Negative for abdominal pain  Musculoskeletal: Positive for myalgias (Finger swelling)  Skin: Negative for rash  Neurological: Negative for headaches  Psychiatric/Behavioral: The patient is not nervous/anxious  Physical Exam  Physical Exam  Vitals signs and nursing note reviewed  Constitutional:       General: She is not in acute distress  HENT:      Head: Normocephalic and atraumatic  Mouth/Throat:      Pharynx: No posterior oropharyngeal erythema  Eyes:      Conjunctiva/sclera: Conjunctivae normal    Neck:      Musculoskeletal: No neck rigidity  Cardiovascular:      Rate and Rhythm: Regular rhythm  Pulmonary:      Effort: Pulmonary effort is normal  No respiratory distress  Abdominal:      General: There is no distension  Musculoskeletal:         General: Swelling (Swelling to right index finger with pus draining from nail bed  Pulp soft  No fusiform swelling or Pain with extension) present  Skin:     General: Skin is warm and dry  Neurological:      Mental Status: She is alert  Mental status is at baseline     Psychiatric:         Mood and Affect: Mood normal          Vital Signs  ED Triage Vitals [08/24/20 1648]   Temperature Pulse Respirations Blood Pressure SpO2   98 8 °F (37 1 °C) 95 18 146/70 100 %      Temp Source Heart Rate Source Patient Position - Orthostatic VS BP Location FiO2 (%)   Tympanic Monitor Sitting Left arm --      Pain Score       7           Vitals:    08/24/20 1648 08/24/20 1829   BP: 146/70 125/67   Pulse: 95 87   Patient Position - Orthostatic VS: Sitting Sitting         Visual Acuity      ED Medications  Medications   doxycycline (VIBRAMYCIN) 100 mg in sodium chloride 0 9 % 100 mL IVPB (100 mg Intravenous New Bag 8/24/20 3883)   sodium chloride 0 9 % bolus 1,000 mL (1,000 mL Intravenous New Bag 8/24/20 1714)   HYDROcodone-acetaminophen (NORCO) 5-325 mg per tablet 1 tablet (1 tablet Oral Given 8/24/20 1714)       Diagnostic Studies  Results Reviewed     Procedure Component Value Units Date/Time    C-reactive protein [[id number]]  (Abnormal) Collected:  08/24/20 1710    Lab Status:  Final result Specimen:  Blood from Arm, Left Updated:  08/24/20 1835     CRP 6 9 mg/L     Sedimentation rate, automated [[id number]]  (Abnormal) Collected:  08/24/20 1710    Lab Status:  Final result Specimen:  Blood from Arm, Left Updated:  08/24/20 1807     Sed Rate 52 mm/hour     Narrative:       New method- Test performed using  automated Rheology Technology  If following a patient's inflammatory disease during treatment, a new baseline should be established      Comprehensive metabolic panel [[id number]]  (Abnormal) Collected:  08/24/20 1710    Lab Status:  Final result Specimen:  Blood from Arm, Left Updated:  08/24/20 1744     Sodium 138 mmol/L      Potassium 4 4 mmol/L      Chloride 102 mmol/L      CO2 26 mmol/L      ANION GAP 10 mmol/L      BUN 25 mg/dL      Creatinine 1 64 mg/dL      Glucose 196 mg/dL      Calcium 9 1 mg/dL      AST 12 U/L      ALT 9 U/L      Alkaline Phosphatase 121 1 U/L      Total Protein 6 5 g/dL      Albumin 3 5 g/dL      Total Bilirubin 0 44 mg/dL      eGFR 34 ml/min/1 73sq m     Narrative:       National Kidney Disease Foundation guidelines for Chronic Kidney Disease (CKD):     Stage 1 with normal or high GFR (GFR > 90 mL/min/1 73 square meters)    Stage 2 Mild CKD (GFR = 60-89 mL/min/1 73 square meters)    Stage 3A Moderate CKD (GFR = 45-59 mL/min/1 73 square meters)    Stage 3B Moderate CKD (GFR = 30-44 mL/min/1 73 square meters)    Stage 4 Severe CKD (GFR = 15-29 mL/min/1 73 square meters)    Stage 5 End Stage CKD (GFR <15 mL/min/1 73 square meters)  Note: GFR calculation is accurate only with a steady state creatinine    CBC and differential [[id number]]  (Abnormal) Collected:  08/24/20 1712    Lab Status:  Final result Specimen:  Blood from Arm, Left Updated:  08/24/20 1725     WBC 12 86 Thousand/uL      RBC 3 81 Million/uL      Hemoglobin 11 3 g/dL      Hematocrit 34 5 %      MCV 91 fL      MCH 29 7 pg      MCHC 32 8 g/dL      RDW 14 3 %      MPV 9 5 fL      Platelets 646 Thousands/uL      Neutrophils Relative 73 %      Immat GRANS % 0 %      Lymphocytes Relative 18 %      Monocytes Relative 8 %      Eosinophils Relative 1 %      Basophils Relative 0 %      Neutrophils Absolute 9 30 Thousands/µL      Immature Grans Absolute 0 05 Thousand/uL      Lymphocytes Absolute 2 30 Thousands/µL      Monocytes Absolute 1 04 Thousand/µL      Eosinophils Absolute 0 13 Thousand/µL      Basophils Absolute 0 04 Thousands/µL     Wound culture and Gram stain [[id number]] Collected:  08/24/20 1710    Lab Status: In process Specimen:  Wound from Hand, Left Updated:  08/24/20 1717                 No orders to display              Procedures  Procedures         ED Course  ED Course as of Aug 24 1851   [facility] Aug 24, 2020   4758 Creatinine(!): 1 64       US AUDIT      Most Recent Value   Initial Alcohol Screen: US AUDIT-C    1  How often do you have a drink containing alcohol?  0 Filed at: 08/24/2020 1650   2  How many drinks containing alcohol do you have on a typical day you are drinking? 0 Filed at: 08/24/2020 1650   3b  FEMALE Any Age, or MALE 65+: How often do you have 4 or more drinks on one occassion? 0 Filed at: 08/24/2020 1650   Audit-C Score  0 Filed at: 08/24/2020 1650                  FAISAL/DAST-10      Most Recent Value   How many times in the past year have you    Used an illegal drug or used a prescription medication for non-medical reasons? Never Filed at: 08/24/2020 1650                                MDM  Number of Diagnoses or Management Options  Diagnosis management comments: 51-year-old female presenting with right index finger redness and swelling    Patient cut her nail bed and the nail fell off  The exposed nail bed is erythematous going along her right index finger  No evidence to suggest  flexor tenosynovitis  Pus is drained from the nail bed  Culture obtained  The pulp is soft and no indication of felon or paronychia        Disposition  Final diagnoses:   Cellulitis of finger of right hand     Time reflects when diagnosis was documented in both MDM as applicable and the Disposition within this note     Time User Action Codes Description Comment    8/24/2020  6:43 PM Bridger Dire Add [L03 011] Cellulitis of finger of right hand       ED Disposition     ED Disposition Condition Date/Time Comment    Discharge Stable Mon Aug 24, 2020  6:42 PM Luana Galaviz discharge to home/self care  Follow-up Information     Follow up With Specialties Details Why Contact Info Additional [facility] Schedule an appointment as soon as possible for a visit in 2 days  [address] [phone]  [address]  , [place], [place], [address]    Doreen Han MD Plastic Surgery, Hand Surgery Schedule an appointment as soon as possible for a visit in 3 days  [address]  [address] [facility] [address]/ [address]             Patient's Medications   Discharge Prescriptions    No medications on file     No discharge procedures on file      PDMP Review       Value Time User    PDMP Reviewed  Yes 8/24/2020  6:43 PM Ying Brown DO          ED Provider  Electronically Signed by           Ying Brown DO  08/24/20 2577

## 2020-08-24 NOTE — DISCHARGE INSTRUCTIONS
Please take the antibiotics as prescribed  You must follow-up with a hand surgeon Dr Alvina Carrasco  Call for an appointment  He said he could see you in the office this week    Please return if the swelling or pain gets worse

## 2020-08-25 RX ORDER — OXYCODONE HYDROCHLORIDE AND ACETAMINOPHEN 5; 325 MG/1; MG/1
1 TABLET ORAL EVERY 4 HOURS PRN
Qty: 14 TABLET | Refills: 0 | Status: SHIPPED | OUTPATIENT
Start: 2020-08-25 | End: 2020-09-02 | Stop reason: ALTCHOICE

## 2020-08-26 ENCOUNTER — OFFICE VISIT (OUTPATIENT)
Dept: FAMILY MEDICINE CLINIC | Facility: CLINIC | Age: 60
End: 2020-08-26
Payer: MEDICARE

## 2020-08-26 VITALS
SYSTOLIC BLOOD PRESSURE: 110 MMHG | BODY MASS INDEX: 40.47 KG/M2 | TEMPERATURE: 99.3 F | HEIGHT: 68 IN | HEART RATE: 86 BPM | WEIGHT: 267 LBS | OXYGEN SATURATION: 96 % | DIASTOLIC BLOOD PRESSURE: 68 MMHG

## 2020-08-26 DIAGNOSIS — L03.011 CELLULITIS OF FINGER OF RIGHT HAND: Primary | ICD-10-CM

## 2020-08-26 LAB
BACTERIA WND AEROBE CULT: ABNORMAL
GRAM STN SPEC: ABNORMAL
GRAM STN SPEC: ABNORMAL

## 2020-08-26 PROCEDURE — 3008F BODY MASS INDEX DOCD: CPT | Performed by: FAMILY MEDICINE

## 2020-08-26 PROCEDURE — 1036F TOBACCO NON-USER: CPT | Performed by: FAMILY MEDICINE

## 2020-08-26 PROCEDURE — 99214 OFFICE O/P EST MOD 30 MIN: CPT | Performed by: FAMILY MEDICINE

## 2020-08-26 RX ORDER — DOXYCYCLINE HYCLATE 100 MG/1
100 CAPSULE ORAL EVERY 12 HOURS SCHEDULED
COMMUNITY
End: 2020-11-10

## 2020-08-26 RX ORDER — GABAPENTIN 300 MG/1
600 CAPSULE ORAL 3 TIMES DAILY
COMMUNITY
End: 2020-11-10 | Stop reason: SDUPTHER

## 2020-08-26 RX ORDER — CITALOPRAM 40 MG/1
40 TABLET ORAL DAILY
COMMUNITY
End: 2020-11-10 | Stop reason: SDUPTHER

## 2020-08-26 RX ORDER — ALLOPURINOL 300 MG/1
300 TABLET ORAL DAILY
COMMUNITY
End: 2021-06-04 | Stop reason: SDUPTHER

## 2020-08-26 RX ORDER — OLANZAPINE 5 MG/1
5 TABLET ORAL
COMMUNITY
End: 2021-03-10 | Stop reason: SDUPTHER

## 2020-08-26 RX ORDER — POT CHLORIDE/POT BICARB/CIT AC 25 MEQ
TABLET, EFFERVESCENT ORAL
COMMUNITY
End: 2021-02-18

## 2020-08-26 RX ORDER — ATORVASTATIN CALCIUM 20 MG/1
20 TABLET, FILM COATED ORAL DAILY
COMMUNITY
End: 2020-09-02 | Stop reason: SDUPTHER

## 2020-08-26 RX ORDER — EMPAGLIFLOZIN 10 MG/1
10 TABLET, FILM COATED ORAL EVERY MORNING
COMMUNITY
End: 2020-09-11 | Stop reason: ALTCHOICE

## 2020-08-26 RX ORDER — CARVEDILOL 6.25 MG/1
6.25 TABLET ORAL 2 TIMES DAILY WITH MEALS
COMMUNITY
End: 2020-11-10 | Stop reason: SDUPTHER

## 2020-08-26 RX ORDER — HYDROCODONE BITARTRATE AND ACETAMINOPHEN 10; 325 MG/15ML; MG/15ML
SOLUTION ORAL EVERY 6 HOURS PRN
COMMUNITY
End: 2020-09-02 | Stop reason: ALTCHOICE

## 2020-08-26 RX ORDER — DIPHENHYDRAMINE HCL 25 MG
25 TABLET ORAL EVERY 6 HOURS PRN
COMMUNITY
End: 2020-11-11

## 2020-08-26 RX ORDER — LACTULOSE 10 G/15ML
20 SOLUTION ORAL
COMMUNITY
End: 2021-09-01 | Stop reason: HOSPADM

## 2020-08-26 RX ORDER — CLONAZEPAM 1 MG/1
1 TABLET ORAL 2 TIMES DAILY
COMMUNITY
End: 2020-11-11

## 2020-08-26 RX ORDER — VENLAFAXINE HYDROCHLORIDE 75 MG/1
75 CAPSULE, EXTENDED RELEASE ORAL DAILY
COMMUNITY
End: 2020-11-10

## 2020-08-26 NOTE — PROGRESS NOTES
Family Medicine New Patient Office Visit  Katelynn Lei 64 y o  female   MRN: [id number] : 1960  ENCOUNTER: 2020 5:44 PM    Assessment and Plan   Cellulitis of finger of right hand  - Pt seen in ED on  for increased redness swelling in the 1st digit her right hand  - wound was cultured and grew MRSA  - patient discharged on 10 days doxycycline 100 mg b i d   - continue course of antibiotics until completed  - patient seen in office today erythema appears improved, there is no discharge, the nailbed is missing, positive for crusting, positive for warmth  - patient to follow-up with hand surgeon at appointment    - patient will follow-up in office with me in 2 weeks  - ED precautions given to patient-should she have increasing pain, swelling, discharge, loss of function in the in the finger, fevers chills return to ED  Chief Complaint     Chief Complaint   Patient presents with    Follow-up       History of Present Illness   Katelynn Lei is a 64y o -year-old female with past medical history of diabetes, hypertension, hyperlipidemia, anxiety, bipolar who presents today for establishment of care since moving from [place]  Patient had trouble with Medicare upon arrival appointment switched to an acute care visit for her recent ED discharge  Patient states cut her finger opening a diet [employer]ke can 2 weeks ago  Patient's nail fell off couple days after that  She has moved up to [place] in to be with family  For patient noticed increased redness streaking up to her elbow, increased pain, discharge and decided to go to the emergency department  Patient will follow-up in the office for new patient visit and establishment in 2 weeks  Patient denies fevers, chills, nausea, vomiting, diarrhea, constipation, dizziness, pain or continued discharge from the finger  Patient states redness has decreased significantly      Review of Systems   Review of Systems   Constitutional: Negative for chills, fatigue and fever  HENT: Negative for facial swelling, rhinorrhea, sinus pain, sneezing and sore throat  Eyes: Negative for pain and redness  Respiratory: Negative for cough, chest tightness, shortness of breath and wheezing  Cardiovascular: Negative for chest pain, palpitations and leg swelling  Gastrointestinal: Negative for abdominal pain, constipation, diarrhea, nausea and vomiting  Endocrine: Negative for polydipsia and polyuria  Genitourinary: Negative for difficulty urinating, dysuria and hematuria  Musculoskeletal: Negative for neck pain and neck stiffness  Skin: Positive for color change and wound  Neurological: Positive for numbness  Negative for dizziness, weakness and headaches  Active Problem List     Patient Active Problem List   Diagnosis    Cellulitis of finger of right hand       Past Medical History, Past Surgical History, Family History, and Social History were reviewed and updated today as appropriate  Objective   /68   Pulse 86   Temp 99 3 °F (37 4 °C)   Ht 5' 8" (1 727 m)   Wt 121 kg (267 lb)   SpO2 96%   BMI 40 60 kg/m²     Physical Exam  Vitals signs and nursing note reviewed  Constitutional:       General: She is not in acute distress  Appearance: She is obese  She is not diaphoretic  HENT:      Head: Normocephalic and atraumatic  Right Ear: External ear normal       Left Ear: External ear normal       Nose: Nose normal       Mouth/Throat:      Mouth: Mucous membranes are moist       Pharynx: No posterior oropharyngeal erythema  Eyes:      Extraocular Movements: Extraocular movements intact  Conjunctiva/sclera: Conjunctivae normal       Pupils: Pupils are equal, round, and reactive to light  Neck:      Musculoskeletal: Normal range of motion  Cardiovascular:      Rate and Rhythm: Normal rate and regular rhythm  Pulses: Normal pulses  Heart sounds: No murmur  No friction rub  No gallop      Pulmonary: Effort: Pulmonary effort is normal  No respiratory distress  Breath sounds: Normal breath sounds  No wheezing, rhonchi or rales  Abdominal:      General: Bowel sounds are normal  There is no distension  Palpations: Abdomen is soft  Tenderness: There is no abdominal tenderness  There is no guarding or rebound  Musculoskeletal:         General: Swelling and tenderness present  Comments: Right 1st digit- negative Knavels sign, negative tenderness to palpation down tendon sheath, no catching, patient had reduced range of motion in flexion due to swelling, full range of motion in extension, positive crepitus at distal portion, strength at 5/5  Skin:     General: Skin is warm  Capillary Refill: Capillary refill takes less than 2 seconds  Coloration: Skin is not pale  Findings: Erythema present  No rash  Neurological:      General: No focal deficit present  Mental Status: She is alert and oriented to person, place, and time  Cranial Nerves: No cranial nerve deficit  Psychiatric:         Mood and Affect: Mood normal        Diabetic Foot Exam    Pertinent Laboratory/Diagnostic Studies:  Lab Results   Component Value Date    BUN 25 (H) 08/24/2020    CREATININE 1 64 (H) 08/24/2020    CALCIUM 9 1 08/24/2020    K 4 4 08/24/2020    CO2 26 08/24/2020     08/24/2020     Lab Results   Component Value Date    ALT 9 08/24/2020    AST 12 (L) 08/24/2020    ALKPHOS 121 1 08/24/2020       Lab Results   Component Value Date    WBC 12 86 (H) 08/24/2020    HGB 11 3 (L) 08/24/2020    HCT 34 5 (L) 08/24/2020    MCV 91 08/24/2020     08/24/2020       No results found for: TSH    No results found for: CHOL  No results found for: TRIG  No results found for: HDL  No results found for: LDLCALC  No results found for: HGBA1C    Results for orders placed or performed during the hospital encounter of 08/24/20   Wound culture and Gram stain    Specimen: Hand, Left;  Wound   Result Value Ref Range    Wound Culture (A)      4+ Growth of Methicillin Resistant Staphylococcus aureus    Gram Stain Result No polys seen (A)     Gram Stain Result 4+ Gram positive cocci in clusters (A)        Susceptibility    Methicillin Resistant Staphylococcus aureus - ELVIN     Ampicillin ($$) >8 00 Resistant ug/ml     Cefazolin ($) <=4 00 Resistant ug/ml     Clindamycin ($) >4 00 Resistant ug/ml     Erythromycin ($$$$) >4 00 Resistant ug/ml     Gentamicin ($$) <=1 Susceptible ug/ml     Oxacillin >2 00 Resistant ug/ml     Tetracycline <=2 Susceptible ug/ml     Trimethoprim + Sulfamethoxazole ($$$) <=0 5/9 5 Susceptible ug/ml     Vancomycin ($) 1 00 Susceptible ug/ml   Comprehensive metabolic panel   Result Value Ref Range    Sodium 138 133 - 145 mmol/L    Potassium 4 4 3 5 - 5 0 mmol/L    Chloride 102 96 - 108 mmol/L    CO2 26 22 - 33 mmol/L    ANION GAP 10 4 - 13 mmol/L    BUN 25 (H) 6 - 20 mg/dL    Creatinine 1 64 (H) 0 40 - 1 10 mg/dL    Glucose 196 (H) 65 - 140 mg/dL    Calcium 9 1 8 4 - 10 2 mg/dL    AST 12 (L) 15 - 41 U/L    ALT 9 5 - 54 U/L    Alkaline Phosphatase 121 1 35 - 140 U/L    Total Protein 6 5 6 4 - 8 3 g/dL    Albumin 3 5 3 4 - 4 8 g/dL    Total Bilirubin 0 44 0 30 - 1 20 mg/dL    eGFR 34 ml/min/1 73sq m   CBC and differential   Result Value Ref Range    WBC 12 86 (H) 4 31 - 10 16 Thousand/uL    RBC 3 81 3 81 - 5 12 Million/uL    Hemoglobin 11 3 (L) 11 5 - 15 4 g/dL    Hematocrit 34 5 (L) 34 8 - 46 1 %    MCV 91 82 - 98 fL    MCH 29 7 26 8 - 34 3 pg    MCHC 32 8 31 4 - 37 4 g/dL    RDW 14 3 11 6 - 15 1 %    MPV 9 5 8 9 - 12 7 fL    Platelets 517 444 - 914 Thousands/uL    Neutrophils Relative 73 43 - 75 %    Immat GRANS % 0 0 - 2 %    Lymphocytes Relative 18 14 - 44 %    Monocytes Relative 8 4 - 12 %    Eosinophils Relative 1 0 - 6 %    Basophils Relative 0 0 - 1 %    Neutrophils Absolute 9 30 (H) 1 85 - 7 62 Thousands/µL    Immature Grans Absolute 0 05 0 00 - 0 20 Thousand/uL    Lymphocytes Absolute 2 30 0 60 - 4 47 Thousands/µL    Monocytes Absolute 1 04 0 17 - 1 22 Thousand/µL    Eosinophils Absolute 0 13 0 00 - 0 61 Thousand/µL    Basophils Absolute 0 04 0 00 - 0 10 Thousands/µL   Sedimentation rate, automated   Result Value Ref Range    Sed Rate 52 (H) 0 - 30 mm/hour   C-reactive protein   Result Value Ref Range    CRP 6 9 (H) 0 0 - 1 0 mg/L       No orders of the defined types were placed in this encounter          Current Medications     Current Outpatient Medications   Medication Sig Dispense Refill    allopurinol (ZYLOPRIM) 300 mg tablet Take 300 mg by mouth daily      atorvastatin (LIPITOR) 20 mg tablet Take 20 mg by mouth daily      carvedilol (COREG) 6 25 mg tablet Take 6 25 mg by mouth 2 (two) times a day with meals Take 1/2 tablet twice daily      citalopram (CeleXA) 40 mg tablet Take 40 mg by mouth daily      clonazePAM (KlonoPIN) 1 mg tablet Take 1 mg by mouth 2 (two) times a day      diphenhydrAMINE (BENADRYL) 25 mg tablet Take 25 mg by mouth every 6 (six) hours as needed for itching      doxycycline hyclate (VIBRAMYCIN) 100 mg capsule Take 100 mg by mouth every 12 (twelve) hours      Empagliflozin (Jardiance) 10 MG TABS Take 10 mg by mouth every morning      gabapentin (NEURONTIN) 300 mg capsule Take 600 mg by mouth 3 (three) times a day      HYDROcodone-acetaminophen (ZAMCET)  mg/15mL solution Take by mouth every 6 (six) hours as needed for moderate pain      lactulose (CHRONULAC) 10 g/15 mL solution Take 20 g by mouth daily at bedtime      magnesium oxide (MAG-OX) 400 mg Take 500 mg by mouth once       OLANZapine (ZyPREXA) 5 mg tablet Take 5 mg by mouth daily at bedtime      oxyCODONE-acetaminophen (PERCOCET) 5-325 mg per tablet Take 1 tablet by mouth every 4 (four) hours as needed for moderate pain for up to 14 dosesMax Daily Amount: 4 tablets 14 tablet 0    POT BICARB-POT CHLORIDE,25MEQ, 25 MEQ TBEF Take by mouth      sitaGLIPtin (JANUVIA) 100 mg tablet Take 100 mg by mouth daily      venlafaxine (EFFEXOR-XR) 75 mg 24 hr capsule Take 75 mg by mouth daily      insulin glargine (LANTUS) 100 units/mL subcutaneous injection Inject 60 Units under the skin 2 (two) times a day 60 units AM, 100 units PM      insulin lispro (HumaLOG) 100 units/mL injection Inject under the skin 3 (three) times a day before meals Between 10-20 units, sliding scale   lisinopril (ZESTRIL) 10 mg tablet Take 10 mg by mouth daily       No current facility-administered medications for this visit  Facility-Administered Medications Ordered in Other Visits   Medication Dose Route Frequency Provider Last Rate Last Dose    doxycycline hyclate (VIBRAMYCIN) capsule 100 mg  100 mg Oral Once Kirk Pulliam MD           ALLERGIES:  No Known Allergies    Health Maintenance     Health Maintenance   Topic Date Due    Hepatitis C Screening  1960    Medicare Annual Wellness Visit (AWV)  1960    MAMMOGRAM  1960    Depression Screening PHQ  09/28/1974    HIV Screening  09/28/1977    BMI: Followup Plan  09/28/1980    DTaP,Tdap,and Td Vaccines (1 - Tdap) 09/28/1983    Cervical Cancer Screening  09/28/1983    Colorectal Cancer Screening  09/28/2012    Influenza Vaccine  07/01/2020    BMI: Adult  08/26/2021    Pneumococcal Vaccine: Pediatrics (0 to 5 Years) and At-Risk Patients (6 to 59 Years)  Aged Out    HIB Vaccine  Aged Out    Hepatitis B Vaccine  Aged Out    IPV Vaccine  Aged Out    Hepatitis A Vaccine  Aged Out    Meningococcal ACWY Vaccine  Aged Out    HPV Vaccine  Aged Out       There is no immunization history on file for this patient  Pooja Carlson DO   [address]  8/26/2020  5:44 PM    Parts of this note were dictated using [employer] dictation software and may have sounds-like errors due to variation in pronunciation

## 2020-08-26 NOTE — ASSESSMENT & PLAN NOTE
- Pt seen in ED on 8/24 for increased redness swelling in the 1st digit her right hand  - wound was cultured and grew MRSA  - patient discharged on 10 days doxycycline 100 mg b i d   - continue course of antibiotics until completed  - patient seen in office today erythema appears improved, there is no discharge, the nailbed is missing, positive for crusting, positive for warmth  - patient to follow-up with hand surgeon at appointment 8/27   - patient will follow-up in office with me in 2 weeks  - ED precautions given to patient-should she have increasing pain, swelling, discharge, loss of function in the in the finger, fevers chills return to ED

## 2020-08-28 ENCOUNTER — TELEPHONE (OUTPATIENT)
Dept: FAMILY MEDICINE CLINIC | Facility: CLINIC | Age: 60
End: 2020-08-28

## 2020-08-28 NOTE — TELEPHONE ENCOUNTER
Patient needs office visit for evaluation of candidacy for home health services, as well as her chronic conditions that were not addressed at the acute visit for cellulitis  Please bring her back for a long appt with Dr Ever Resendez so they can review the paperwork at the time of the visit and obtain all of the information needed   She will also likely need a separate visit for her diabetes, etc  Thanks!  -Dr June Sloan

## 2020-08-28 NOTE — TELEPHONE ENCOUNTER
Fax received for Physician Certification Form for completion  Patient saw Dr Juan J Carvalho but needs attending signature  Form placed in Dr Juan J Carvalho' folder in precept room  Please advise  Thank you

## 2020-09-01 RX ORDER — HYDROCODONE BITARTRATE AND ACETAMINOPHEN 10; 325 MG/15ML; MG/15ML
SOLUTION ORAL EVERY 6 HOURS PRN
Status: CANCELLED | OUTPATIENT
Start: 2020-09-01

## 2020-09-02 ENCOUNTER — OFFICE VISIT (OUTPATIENT)
Dept: FAMILY MEDICINE CLINIC | Facility: CLINIC | Age: 60
End: 2020-09-02
Payer: MEDICARE

## 2020-09-02 VITALS
BODY MASS INDEX: 40.16 KG/M2 | TEMPERATURE: 99.7 F | SYSTOLIC BLOOD PRESSURE: 130 MMHG | WEIGHT: 265 LBS | OXYGEN SATURATION: 94 % | HEART RATE: 93 BPM | HEIGHT: 68 IN | DIASTOLIC BLOOD PRESSURE: 70 MMHG

## 2020-09-02 DIAGNOSIS — L03.011 CELLULITIS OF FINGER OF RIGHT HAND: ICD-10-CM

## 2020-09-02 DIAGNOSIS — M54.42 CHRONIC MIDLINE LOW BACK PAIN WITH BILATERAL SCIATICA: ICD-10-CM

## 2020-09-02 DIAGNOSIS — F41.9 ANXIETY: ICD-10-CM

## 2020-09-02 DIAGNOSIS — E11.42 TYPE 2 DIABETES MELLITUS WITH DIABETIC POLYNEUROPATHY, WITH LONG-TERM CURRENT USE OF INSULIN (HCC): ICD-10-CM

## 2020-09-02 DIAGNOSIS — G89.29 CHRONIC MIDLINE LOW BACK PAIN WITH BILATERAL SCIATICA: ICD-10-CM

## 2020-09-02 DIAGNOSIS — Z87.891 PERSONAL HISTORY OF NICOTINE DEPENDENCE: ICD-10-CM

## 2020-09-02 DIAGNOSIS — Z79.4 TYPE 2 DIABETES MELLITUS WITH DIABETIC POLYNEUROPATHY, WITH LONG-TERM CURRENT USE OF INSULIN (HCC): ICD-10-CM

## 2020-09-02 DIAGNOSIS — M17.12 PRIMARY OSTEOARTHRITIS OF LEFT KNEE: ICD-10-CM

## 2020-09-02 DIAGNOSIS — Z12.2 ENCOUNTER FOR SCREENING FOR LUNG CANCER: ICD-10-CM

## 2020-09-02 DIAGNOSIS — M54.41 CHRONIC MIDLINE LOW BACK PAIN WITH BILATERAL SCIATICA: ICD-10-CM

## 2020-09-02 DIAGNOSIS — Z12.31 ENCOUNTER FOR SCREENING MAMMOGRAM FOR MALIGNANT NEOPLASM OF BREAST: ICD-10-CM

## 2020-09-02 DIAGNOSIS — F33.2 SEVERE EPISODE OF RECURRENT MAJOR DEPRESSIVE DISORDER, WITHOUT PSYCHOTIC FEATURES (HCC): Primary | ICD-10-CM

## 2020-09-02 DIAGNOSIS — Z12.11 COLON CANCER SCREENING: ICD-10-CM

## 2020-09-02 DIAGNOSIS — I25.10 CORONARY ARTERY DISEASE INVOLVING NATIVE HEART WITHOUT ANGINA PECTORIS, UNSPECIFIED VESSEL OR LESION TYPE: ICD-10-CM

## 2020-09-02 DIAGNOSIS — Z12.39 BREAST CANCER SCREENING: ICD-10-CM

## 2020-09-02 PROBLEM — Z00.00 HEALTHCARE MAINTENANCE: Status: ACTIVE | Noted: 2020-09-02

## 2020-09-02 PROBLEM — E11.9 TYPE 2 DIABETES MELLITUS, WITH LONG-TERM CURRENT USE OF INSULIN (HCC): Status: ACTIVE | Noted: 2020-09-02

## 2020-09-02 PROBLEM — F32.9 MAJOR DEPRESSIVE DISORDER: Status: ACTIVE | Noted: 2020-09-02

## 2020-09-02 LAB
CREAT UR-MCNC: 86.5 MG/DL
MICROALBUMIN UR-MCNC: 2350 MG/L (ref 0–20)
MICROALBUMIN/CREAT 24H UR: 2717 MG/G CREATININE (ref 0–30)
SL AMB POCT HEMOGLOBIN AIC: 8.8 (ref ?–6.5)

## 2020-09-02 PROCEDURE — 82043 UR ALBUMIN QUANTITATIVE: CPT | Performed by: FAMILY MEDICINE

## 2020-09-02 PROCEDURE — 99214 OFFICE O/P EST MOD 30 MIN: CPT | Performed by: FAMILY MEDICINE

## 2020-09-02 PROCEDURE — 83036 HEMOGLOBIN GLYCOSYLATED A1C: CPT | Performed by: FAMILY MEDICINE

## 2020-09-02 PROCEDURE — 82570 ASSAY OF URINE CREATININE: CPT | Performed by: FAMILY MEDICINE

## 2020-09-02 RX ORDER — HYDROCODONE BITARTRATE AND ACETAMINOPHEN 10; 325 MG/1; MG/1
1 TABLET ORAL EVERY 8 HOURS PRN
Qty: 21 TABLET | Refills: 0 | Status: SHIPPED | OUTPATIENT
Start: 2020-09-02 | End: 2020-09-09 | Stop reason: ALTCHOICE

## 2020-09-02 RX ORDER — ATORVASTATIN CALCIUM 40 MG/1
40 TABLET, FILM COATED ORAL DAILY
Qty: 90 TABLET | Refills: 3 | Status: SHIPPED | OUTPATIENT
Start: 2020-09-02 | End: 2020-11-10 | Stop reason: SDUPTHER

## 2020-09-02 NOTE — ASSESSMENT & PLAN NOTE
- patient had encounter with hand surgeon who continued antibiotic treatment with clindamycin for another 10 days  - in office today erythema appears improved, there is no discharge from the finger, there is crusting, positive warmth, no loss of function  - patient to follow-up with hand surgeon at her next appointment  - continue current course of antibiotics until complete  - ED precautions given to the patient should she have increasing pain, swelling, discharge, redness, fevers, or chills please return to the ED

## 2020-09-02 NOTE — PROGRESS NOTES
Family Medicine New Patient Office Visit  Saulo Ross 64 y o  female   MRN: [id number] : 1960  ENCOUNTER: 2020 5:56 PM    Assessment and Plan   Major depressive disorder  -   PHQ-9 Depression Screening    PHQ-9:    Frequency of the following problems over the past two weeks:       Little interest or pleasure in doing things:  2 - more than half the days  Feeling down, depressed, or hopeless:  1 - several days  Trouble falling or staying asleep, or sleeping too much:  3 - nearly every day  Feeling tired or having little energy:  3 - nearly every day  Poor appetite or overeating:  3 - nearly every day  Feeling bad about yourself - or that you are a failure or have let yourself or your family down:  3 - nearly every day  Trouble concentrating on things, such as reading the newspaper or watching television:  2 - more than half the days  Moving or speaking so slowly that other people could have noticed  Or the opposite - being so fidgety or restless that you have been moving around a lot more than usual:  2 - more than half the days  Thoughts that you would be better off dead, or of hurting yourself in some way:  0 - not at all  PHQ-2 Score:  3  PHQ-9 Score:  19       - patient with history of major depressive disorder in [place] currently on Celexa 40 mg tabs daily and Zyprexa 5 mg   - patient feels depression is better controlled than it was in [place]  - patient does not want referral to psych or any therapist as she feels it did not help her   - patient denies any SI or HI  Osteoarthritis of left knee  - patient was following with Orthopedic surgery while in [place] for her osteoarthritis of left knee  - patient is status post knee replacement in on the right side in 2017   - patient signed for record release to be sent to us  - referral for [facility] orthopedics given today      Anxiety  - Patient with general anxiety and difficulty falling asleep was placed on Effexor extended release 75 mg daily and Klonopin 1 mg at night in [place] by her PCP there  - records release requested  - continue Klonopin 1 mg at bedtime as needed  - follow-up in office in 1 week  Cellulitis of finger of right hand  - patient had encounter with hand surgeon who continued antibiotic treatment with clindamycin for another 10 days  - in office today erythema appears improved, there is no discharge from the finger, there is crusting, positive warmth, no loss of function  - patient to follow-up with hand surgeon at her next appointment  - continue current course of antibiotics until complete  - ED precautions given to the patient should she have increasing pain, swelling, discharge, redness, fevers, or chills please return to the ED  Type 2 diabetes mellitus, with long-term current use of insulin ([facility])    Lab Results   Component Value Date    HGBA1C 8 8 (A) 09/02/2020     - Pt currently on 100 Units Lantus at night and 60 units in AM  Pt was not splitting the doses into multiple locations and likely was not absorbing full amount    - Decreased night time Lantus to 50 units and morning to 50 units   - Continue oral medications as prescribed  - Glucose numbers at home reportedly in the 200's  - POC A1c at 8 8 today  - Microalbumin/Creatnine ratio collected in clinic today  - Education on monitoring once when fasted in the morning and then once 2 hours after biggest meal was discussed  - Referral to endocrinology given  - Follow-up in office in 1 week  Chief Complaint     Chief Complaint   Patient presents with    Follow-up       History of Present Illness   Jeanne Brooke is a 64y o -year-old female w with past medical history type 2 diabetes on insulin, coronary artery disease status post stents in 2012 revised in 2017, major depressive disorder, anxiety, hypertension, lumbar radiculopathy and osteoarthritis  She presents to the office today to establish care    Patient recently moved to [place] in from [place]  She has requested her records released from previous PCP to us  Patient ambulates to the office today using cane due to pain in her left knee from osteoarthritis  Patient was supposed to have left knee replacement in [place] needs to be set with orthopedics here  Patient also complaining back pain which is chronic for her, patient was on hydrocodone in [place] and needs a week's worth before we can get her on pain contract moving forward  Patient states she has not been good about keeping up on her annual screenings or healthcare maintenance  Patient denies any fevers, chills, nausea, vomiting, diarrhea, constipation, shortness of breath, chest pain  Review of Systems   Review of Systems   Constitutional: Negative for chills, fatigue, fever and unexpected weight change  HENT: Negative for ear pain, postnasal drip, rhinorrhea, sinus pain and tinnitus  Eyes: Negative for pain, redness and itching  Respiratory: Negative for cough, chest tightness, shortness of breath and wheezing  Cardiovascular: Positive for leg swelling  Negative for chest pain and palpitations  Gastrointestinal: Negative for constipation, diarrhea, nausea and vomiting  Endocrine: Negative for polydipsia and polyuria  Genitourinary: Negative for difficulty urinating, dysuria and hematuria  Musculoskeletal: Positive for back pain and gait problem  Negative for neck pain and neck stiffness  Skin: Negative for rash  Redness in R index finger  Neurological: Negative for dizziness, weakness and headaches  Psychiatric/Behavioral: Negative for confusion and suicidal ideas  The patient is nervous/anxious          Active Problem List     Patient Active Problem List   Diagnosis    Cellulitis of finger of right hand    Major depressive disorder    Type 2 diabetes mellitus, with long-term current use of insulin (HCC)    Anxiety    CAD (coronary artery disease)    Osteoarthritis of left knee   Optimus3 maintenance       Past Medical History, Past Surgical History, Family History, and Social History were reviewed and updated today as appropriate  Objective   /70   Pulse 93   Temp 99 7 °F (37 6 °C)   Ht 5' 8" (1 727 m)   Wt 120 kg (265 lb)   SpO2 94%   BMI 40 29 kg/m²     Physical Exam  Vitals signs and nursing note reviewed  Constitutional:       General: She is not in acute distress  Appearance: Normal appearance  She is obese  She is not ill-appearing  HENT:      Head: Normocephalic and atraumatic  Right Ear: Tympanic membrane and external ear normal       Left Ear: Tympanic membrane and external ear normal       Nose: Nose normal  No congestion or rhinorrhea  Mouth/Throat:      Mouth: Mucous membranes are moist       Pharynx: No oropharyngeal exudate or posterior oropharyngeal erythema  Eyes:      General: No scleral icterus  Extraocular Movements: Extraocular movements intact  Conjunctiva/sclera: Conjunctivae normal       Pupils: Pupils are equal, round, and reactive to light  Neck:      Musculoskeletal: Normal range of motion  Vascular: No carotid bruit  Cardiovascular:      Rate and Rhythm: Normal rate and regular rhythm  Pulses: Normal pulses  Heart sounds: No murmur  No friction rub  No gallop  Pulmonary:      Effort: Pulmonary effort is normal  No respiratory distress  Breath sounds: Normal breath sounds  No wheezing, rhonchi or rales  Abdominal:      General: Bowel sounds are normal  There is no distension  Palpations: Abdomen is soft  Tenderness: There is no abdominal tenderness  There is no guarding or rebound  Musculoskeletal: Normal range of motion  Skin:     General: Skin is warm and dry  Findings: Erythema present  Comments: R index finger:  Positive erythema, positive crusting, positive warmth, negative discharge, full range of motion  Neurological:      General: No focal deficit present        Mental Status: She is alert and oriented to person, place, and time  Cranial Nerves: No cranial nerve deficit     Psychiatric:         Mood and Affect: Mood normal        Diabetic Foot Exam    Pertinent Laboratory/Diagnostic Studies:  Lab Results   Component Value Date    BUN 25 (H) 08/24/2020    CREATININE 1 64 (H) 08/24/2020    CALCIUM 9 1 08/24/2020    K 4 4 08/24/2020    CO2 26 08/24/2020     08/24/2020     Lab Results   Component Value Date    ALT 9 08/24/2020    AST 12 (L) 08/24/2020    ALKPHOS 121 1 08/24/2020       Lab Results   Component Value Date    WBC 12 86 (H) 08/24/2020    HGB 11 3 (L) 08/24/2020    HCT 34 5 (L) 08/24/2020    MCV 91 08/24/2020     08/24/2020       No results found for: TSH    No results found for: CHOL  No results found for: TRIG  No results found for: HDL  No results found for: [facility]  Lab Results   Component Value Date    HGBA1C 8 8 (A) 09/02/2020       Results for orders placed or performed in visit on 09/02/20   POCT hemoglobin A1c   Result Value Ref Range    Hemoglobin A1C 8 8 (A) 6 5       Orders Placed This Encounter   Procedures    Mammo screening bilateral w cad    CT lung screening program    Microalbumin / creatinine urine ratio    Ambulatory referral to Orthopedic Surgery    Ambulatory referral to Gastroenterology    Ambulatory referral to Endocrinology    POCT hemoglobin A1c         Current Medications     Current Outpatient Medications   Medication Sig Dispense Refill    allopurinol (ZYLOPRIM) 300 mg tablet Take 300 mg by mouth daily      atorvastatin (LIPITOR) 40 mg tablet Take 1 tablet (40 mg total) by mouth daily 90 tablet 3    carvedilol (COREG) 6 25 mg tablet Take 6 25 mg by mouth 2 (two) times a day with meals Take 1/2 tablet twice daily      citalopram (CeleXA) 40 mg tablet Take 40 mg by mouth daily      clonazePAM (KlonoPIN) 1 mg tablet Take 1 mg by mouth 2 (two) times a day      diphenhydrAMINE (BENADRYL) 25 mg tablet Take 25 mg by mouth every 6 (six) hours as needed for itching      doxycycline hyclate (VIBRAMYCIN) 100 mg capsule Take 100 mg by mouth every 12 (twelve) hours      Empagliflozin (Jardiance) 10 MG TABS Take 10 mg by mouth every morning      gabapentin (NEURONTIN) 300 mg capsule Take 600 mg by mouth 3 (three) times a day      HYDROcodone-acetaminophen (NORCO)  mg per tablet Take 1 tablet by mouth every 8 (eight) hours as needed for moderate pain for up to 7 daysMax Daily Amount: 3 tablets 21 tablet 0    insulin glargine (LANTUS) 100 units/mL subcutaneous injection Inject 60 Units under the skin 2 (two) times a day 60 units AM, 100 units PM      insulin lispro (HumaLOG) 100 units/mL injection Inject under the skin 3 (three) times a day before meals Between 10-20 units, sliding scale   lactulose (CHRONULAC) 10 g/15 mL solution Take 20 g by mouth daily at bedtime      lisinopril (ZESTRIL) 10 mg tablet Take 10 mg by mouth daily      magnesium oxide (MAG-OX) 400 mg Take 500 mg by mouth once       OLANZapine (ZyPREXA) 5 mg tablet Take 5 mg by mouth daily at bedtime      POT BICARB-POT CHLORIDE,25MEQ, 25 MEQ TBEF Take by mouth      sitaGLIPtin (JANUVIA) 100 mg tablet Take 100 mg by mouth daily      venlafaxine (EFFEXOR-XR) 75 mg 24 hr capsule Take 75 mg by mouth daily       No current facility-administered medications for this visit        Facility-Administered Medications Ordered in Other Visits   Medication Dose Route Frequency Provider Last Rate Last Dose    doxycycline hyclate (VIBRAMYCIN) capsule 100 mg  100 mg Oral Once eBau Mullen MD           ALLERGIES:  No Known Allergies    Health Maintenance     Health Maintenance   Topic Date Due    Hepatitis C Screening  1960    Medicare Annual Wellness Visit (AWV)  1960    MAMMOGRAM  1960    HEMOGLOBIN A1C  1960    Pneumococcal Vaccine: Pediatrics (0 to 5 Years) and At-Risk Patients (6 to 59 Years) (1 of 1 - PPSV23) 09/28/1968    Diabetic Foot Exam  09/28/1972    DM Eye Exam  09/28/1972    HIV Screening  09/28/1977    BMI: Followup Plan  09/28/1980    DTaP,Tdap,and Td Vaccines (1 - Tdap) 09/28/1983    Cervical Cancer Screening  09/28/1983    Colorectal Cancer Screening  09/28/2012    Influenza Vaccine  07/01/2020    BMI: Adult  09/02/2021    HIB Vaccine  Aged Out    Hepatitis B Vaccine  Aged Out    IPV Vaccine  Aged Out    Hepatitis A Vaccine  Aged Out    Meningococcal ACWY Vaccine  Aged Out    HPV Vaccine  Aged Out       There is no immunization history on file for this patient  Cezar Garcia DO   [address]  9/2/2020  5:56 PM    Parts of this note were dictated using [employer] dictation software and may have sounds-like errors due to variation in pronunciation

## 2020-09-02 NOTE — ASSESSMENT & PLAN NOTE
- Patient with general anxiety and difficulty falling asleep was placed on Effexor extended release 75 mg daily and Klonopin 1 mg at night in [place] by her PCP there  - records release requested  - continue Klonopin 1 mg at bedtime as needed  - follow-up in office in 1 week

## 2020-09-02 NOTE — ASSESSMENT & PLAN NOTE
Lab Results   Component Value Date    HGBA1C 8 8 (A) 09/02/2020     - Pt currently on 100 Units Lantus at night and 60 units in AM  Pt was not splitting the doses into multiple locations and likely was not absorbing full amount    - Decreased night time Lantus to 50 units and morning to 50 units   - Continue oral medications as prescribed  - Glucose numbers at home reportedly in the 200's  - POC A1c at 8 8 today  - Microalbumin/Creatnine ratio collected in clinic today  - Education on monitoring once when fasted in the morning and then once 2 hours after biggest meal was discussed  - Referral to endocrinology given  - Follow-up in office in 1 week

## 2020-09-02 NOTE — ASSESSMENT & PLAN NOTE
-   PHQ-9 Depression Screening    PHQ-9:    Frequency of the following problems over the past two weeks:       Little interest or pleasure in doing things:  2 - more than half the days  Feeling down, depressed, or hopeless:  1 - several days  Trouble falling or staying asleep, or sleeping too much:  3 - nearly every day  Feeling tired or having little energy:  3 - nearly every day  Poor appetite or overeating:  3 - nearly every day  Feeling bad about yourself - or that you are a failure or have let yourself or your family down:  3 - nearly every day  Trouble concentrating on things, such as reading the newspaper or watching television:  2 - more than half the days  Moving or speaking so slowly that other people could have noticed  Or the opposite - being so fidgety or restless that you have been moving around a lot more than usual:  2 - more than half the days  Thoughts that you would be better off dead, or of hurting yourself in some way:  0 - not at all  PHQ-2 Score:  3  PHQ-9 Score:  19       - patient with history of major depressive disorder in [place] currently on Celexa 40 mg tabs daily and Zyprexa 5 mg   - patient feels depression is better controlled than it was in [place]  - patient does not want referral to psych or any therapist as she feels it did not help her   - patient denies any SI or HI

## 2020-09-04 ENCOUNTER — TELEPHONE (OUTPATIENT)
Dept: INTERNAL MEDICINE CLINIC | Facility: CLINIC | Age: 60
End: 2020-09-04

## 2020-09-04 NOTE — TELEPHONE ENCOUNTER
----- Message from Tapan Foreman RN sent at 9/4/2020  8:55 AM EDT -----  Hi I was hoping this  patient could get set up for Endo clinic for next available new patient appointment   ASAP  Thank you

## 2020-09-09 ENCOUNTER — OFFICE VISIT (OUTPATIENT)
Dept: FAMILY MEDICINE CLINIC | Facility: CLINIC | Age: 60
End: 2020-09-09
Payer: MEDICARE

## 2020-09-09 VITALS
HEART RATE: 92 BPM | HEIGHT: 68 IN | WEIGHT: 264 LBS | RESPIRATION RATE: 16 BRPM | BODY MASS INDEX: 40.01 KG/M2 | TEMPERATURE: 97.8 F | SYSTOLIC BLOOD PRESSURE: 142 MMHG | DIASTOLIC BLOOD PRESSURE: 70 MMHG

## 2020-09-09 DIAGNOSIS — Z11.59 ENCOUNTER FOR HEPATITIS C VIRUS SCREENING TEST FOR HIGH RISK PATIENT: ICD-10-CM

## 2020-09-09 DIAGNOSIS — M17.12 PRIMARY OSTEOARTHRITIS OF LEFT KNEE: ICD-10-CM

## 2020-09-09 DIAGNOSIS — Z98.1 S/P CERVICAL SPINAL FUSION: ICD-10-CM

## 2020-09-09 DIAGNOSIS — E11.42 TYPE 2 DIABETES MELLITUS WITH DIABETIC POLYNEUROPATHY, WITH LONG-TERM CURRENT USE OF INSULIN (HCC): Primary | ICD-10-CM

## 2020-09-09 DIAGNOSIS — N18.30 CKD (CHRONIC KIDNEY DISEASE) STAGE 3, GFR 30-59 ML/MIN (HCC): ICD-10-CM

## 2020-09-09 DIAGNOSIS — D64.9 NORMOCHROMIC NORMOCYTIC ANEMIA: ICD-10-CM

## 2020-09-09 DIAGNOSIS — R79.89 ABNORMAL LFTS: ICD-10-CM

## 2020-09-09 DIAGNOSIS — Z91.89 ENCOUNTER FOR HEPATITIS C VIRUS SCREENING TEST FOR HIGH RISK PATIENT: ICD-10-CM

## 2020-09-09 DIAGNOSIS — Z79.4 TYPE 2 DIABETES MELLITUS WITH DIABETIC POLYNEUROPATHY, WITH LONG-TERM CURRENT USE OF INSULIN (HCC): Primary | ICD-10-CM

## 2020-09-09 PROCEDURE — 99214 OFFICE O/P EST MOD 30 MIN: CPT | Performed by: FAMILY MEDICINE

## 2020-09-09 NOTE — ASSESSMENT & PLAN NOTE
- Last CBC done in ER in August   - Will repeat CBC today  - Iron studies ordered  - Follow up in 1 week

## 2020-09-09 NOTE — PROGRESS NOTES
Family Medicine Follow-Up Office Visit  Randa Gates 64 y o  female   MRN: [id number] : 1960  ENCOUNTER: 2020 5:13 PM    Assessment and Plan   Type 2 diabetes mellitus, with long-term current use of insulin (Formerly [facility])    Lab Results   Component Value Date    HGBA1C 8 8 (A) 2020     - Pt taking her Lantus as 50 units in AM and 50 units PM and tolerating well  She states her sugars the last week on that regimen have stayed in the 200's  - Pt continues to take her Jardiance and Januvia as prescribed  - Microalbumin/Cr - 5693  - Pt is currently on Lisinopril 10 mg daily  Referral to Nephrology placed, will consider increasing dose  - Pt has Endocrine appointment on    - Pt given referral for Podiatry  - Follow up next week after Endocrine visit  CKD (chronic kidney disease) stage 3, GFR 30-59 ml/min (Formerly [facility])  - Pt with last CMP done in ER on   No prior hx to compare  Records requested from [place]  Pt denies any decrease in urine production and says she is always drinking plenty of water  - Cr - 1 6, eGFR at 31  - Pt currently on Lisinopril 10 mg, will consider increasing dose after referral to Nephrology  - Referred to Nephrology  - CMP ordered  Abnormal LFTs  - Pt with past hx of abnormal LFTs, remote hx of hospitalization with increased Ammonia and and placed on lactulose  - No records to compare   - LFTs ordered  - INR ordered  - Hepatitis C screen ordered  - Follow up in one week  Normochromic normocytic anemia  - Last CBC done in ER in August   - Will repeat CBC today  - Iron studies ordered  - Follow up in 1 week  S/P cervical spinal fusion  -S/p cervical fusion back in [place]   -Pt had been on Hydrocodone 10 mg(Norco) 4 times per day  -Prescribed Diclofenac gel   -Pt referred to Pain management for follow up  - Please follow up with our office if your appointment with pain management is delayed and your pain is really poorly controlled  Chief Complaint     Chief Complaint   Patient presents with    Follow-up       History of Present Illness   Jacolyn Sever is a 64y o -year-old female who presents today for follow up  Pt has been tolerating her new insulin regiment as prescribed without any issues  She states her sugars have maintained in the 200's  She admits to not taking her fasting levels in the morning but will try to remember to do so  Pt also concerned about refill of her pain medications  She was advised today that we will refer her to pain management to establish her relationship there  We also have asked her to call about her record release as we have no history as she has recently moved from [place]  Pt denies any fevers, chills, nausea, vomiting, diarrhea, increased fatigue, dizziness, palpitations, sob, chest pains today  Review of Systems   Review of Systems   Constitutional: Negative for chills, fatigue, fever and unexpected weight change  HENT: Negative for ear pain, postnasal drip, rhinorrhea, sneezing, sore throat and tinnitus  Eyes: Negative for pain and redness  Respiratory: Negative for cough, chest tightness, shortness of breath and wheezing  Cardiovascular: Negative for chest pain and palpitations  Gastrointestinal: Negative for abdominal pain, constipation, diarrhea, nausea and vomiting  Endocrine: Negative for polydipsia and polyuria  Genitourinary: Negative for difficulty urinating, dysuria, hematuria and vaginal pain  Musculoskeletal: Positive for back pain, neck pain and neck stiffness  Skin: Negative for pallor and rash  Neurological: Positive for numbness  Negative for syncope, weakness and headaches  Psychiatric/Behavioral: Negative for suicidal ideas  The patient is nervous/anxious          Active Problem List     Patient Active Problem List   Diagnosis    Cellulitis of finger of right hand    Major depressive disorder    Type 2 diabetes mellitus, with long-term current use of insulin (HCC)    Anxiety    CAD (coronary artery disease)    Osteoarthritis of left knee    Healthcare maintenance    Normochromic normocytic anemia    CKD (chronic kidney disease) stage 3, GFR 30-59 ml/min ([facility])    Abnormal LFTs    S/P cervical spinal fusion       Past Medical History, Past Surgical History, Family History, and Social History were reviewed and updated today as appropriate  Objective   /70   Pulse 92   Temp 97 8 °F (36 6 °C)   Resp 16   Ht 5' 8" (1 727 m)   Wt 120 kg (264 lb)   BMI 40 14 kg/m²     Physical Exam  Vitals signs and nursing note reviewed  Constitutional:       Appearance: Normal appearance  She is obese  She is not ill-appearing  HENT:      Head: Normocephalic and atraumatic  Right Ear: External ear normal       Left Ear: External ear normal       Nose: Nose normal       Mouth/Throat:      Mouth: Mucous membranes are dry  Eyes:      Extraocular Movements: Extraocular movements intact  Conjunctiva/sclera: Conjunctivae normal       Pupils: Pupils are equal, round, and reactive to light  Neck:      Musculoskeletal: Muscular tenderness present  Vascular: No carotid bruit  Comments: Decreased ROM in Flexion and Extension  Cardiovascular:      Rate and Rhythm: Normal rate and regular rhythm  Pulses: Normal pulses  Dorsalis pedis pulses are 2+ on the right side and 2+ on the left side  Posterior tibial pulses are 2+ on the right side and 2+ on the left side  Heart sounds: Normal heart sounds  No murmur  No friction rub  No gallop  Pulmonary:      Effort: Pulmonary effort is normal  No respiratory distress  Breath sounds: Normal breath sounds  No wheezing, rhonchi or rales  Abdominal:      General: Abdomen is flat  Bowel sounds are normal  There is no distension  Palpations: Abdomen is soft  Tenderness: There is no abdominal tenderness  There is no guarding or rebound     Musculoskeletal: General: Tenderness present  Right lower leg: No edema  Left lower leg: No edema  Skin:     General: Skin is warm and dry  Capillary Refill: Capillary refill takes less than 2 seconds  Coloration: Skin is not pale  Findings: No erythema or rash  Neurological:      General: No focal deficit present  Mental Status: She is alert and oriented to person, place, and time  Psychiatric:         Mood and Affect: Mood normal        Right Foot/Ankle   Right Foot Inspection  Skin Exam:                        Amputation: (Comments: R-2nd toe)    Sensory   Vibration: absent  Proprioception: absent   Monofilament testing: absent  Vascular    The right DP pulse is 2+  The right PT pulse is 2+  Left Foot/Ankle  Left Foot Inspection                                           Sensory   Vibration: absent  Proprioception: absent  Monofilament: absent  Vascular    The left DP pulse is 2+  The left PT pulse is 2+  Assign Risk Category:  Deformity present; Loss of protective sensation;        Risk: 3      Pertinent Laboratory/Diagnostic Studies:  Lab Results   Component Value Date    BUN 25 (H) 08/24/2020    CREATININE 1 64 (H) 08/24/2020    CALCIUM 9 1 08/24/2020    K 4 4 08/24/2020    CO2 26 08/24/2020     08/24/2020     Lab Results   Component Value Date    ALT 9 08/24/2020    AST 12 (L) 08/24/2020    ALKPHOS 121 1 08/24/2020       Lab Results   Component Value Date    WBC 12 86 (H) 08/24/2020    HGB 11 3 (L) 08/24/2020    HCT 34 5 (L) 08/24/2020    MCV 91 08/24/2020     08/24/2020       No results found for: TSH    No results found for: CHOL  No results found for: TRIG  No results found for: HDL  No results found for: [address]  Lab Results   Component Value Date    HGBA1C 8 8 (A) 09/02/2020       Results for orders placed or performed in visit on 09/02/20   Microalbumin / creatinine urine ratio   Result Value Ref Range    Creatinine, Ur 86 5 mg/dL    Microalbum  ,U,Random 2,350 0 (H) 0 0 - 20 0 mg/L    Microalb Creat Ratio 2,717 (H) 0 - 30 mg/g creatinine   POCT hemoglobin A1c   Result Value Ref Range    Hemoglobin A1C 8 8 (A) 6 5       Orders Placed This Encounter   Procedures    US liver    Hepatitis C antibody    Protime-INR    Comprehensive metabolic panel    Ambulatory referral to Nephrology    Ambulatory referral to Atilio Rodrigues Ambulatory referral to Pain Management         Current Medications     Current Outpatient Medications   Medication Sig Dispense Refill    allopurinol (ZYLOPRIM) 300 mg tablet Take 300 mg by mouth daily      atorvastatin (LIPITOR) 40 mg tablet Take 1 tablet (40 mg total) by mouth daily 90 tablet 3    carvedilol (COREG) 6 25 mg tablet Take 6 25 mg by mouth 2 (two) times a day with meals Take 1/2 tablet twice daily      citalopram (CeleXA) 40 mg tablet Take 40 mg by mouth daily      clonazePAM (KlonoPIN) 1 mg tablet Take 1 mg by mouth 2 (two) times a day      diclofenac sodium (VOLTAREN) 1 % Apply 2 g topically 4 (four) times a day 150 g 0    diphenhydrAMINE (BENADRYL) 25 mg tablet Take 25 mg by mouth every 6 (six) hours as needed for itching      doxycycline hyclate (VIBRAMYCIN) 100 mg capsule Take 100 mg by mouth every 12 (twelve) hours      Empagliflozin (Jardiance) 10 MG TABS Take 10 mg by mouth every morning      gabapentin (NEURONTIN) 300 mg capsule Take 600 mg by mouth 3 (three) times a day      HYDROcodone-acetaminophen (NORCO)  mg per tablet Take 1 tablet by mouth every 8 (eight) hours as needed for moderate pain for up to 7 daysMax Daily Amount: 3 tablets 21 tablet 0    insulin glargine (LANTUS) 100 units/mL subcutaneous injection Inject 60 Units under the skin 2 (two) times a day 60 units AM, 100 units PM      insulin lispro (HumaLOG) 100 units/mL injection Inject under the skin 3 (three) times a day before meals Between 10-20 units, sliding scale        lactulose (CHRONULAC) 10 g/15 mL solution Take 20 g by mouth daily at bedtime  lisinopril (ZESTRIL) 10 mg tablet Take 10 mg by mouth daily      magnesium oxide (MAG-OX) 400 mg Take 500 mg by mouth once       OLANZapine (ZyPREXA) 5 mg tablet Take 5 mg by mouth daily at bedtime      POT BICARB-POT CHLORIDE,25MEQ, 25 MEQ TBEF Take by mouth      sitaGLIPtin (JANUVIA) 100 mg tablet Take 100 mg by mouth daily      venlafaxine (EFFEXOR-XR) 75 mg 24 hr capsule Take 75 mg by mouth daily       No current facility-administered medications for this visit  Facility-Administered Medications Ordered in Other Visits   Medication Dose Route Frequency Provider Last Rate Last Dose    doxycycline hyclate (VIBRAMYCIN) capsule 100 mg  100 mg Oral Once Krystle Raza MD           ALLERGIES:  No Known Allergies    Health Maintenance     Health Maintenance   Topic Date Due    Hepatitis C Screening  1960    Medicare Annual Wellness Visit (AWV)  1960    MAMMOGRAM  1960    Pneumococcal Vaccine: Pediatrics (0 to 5 Years) and At-Risk Patients (6 to 59 Years) (1 of 1 - PPSV23) 09/28/1968    Diabetic Foot Exam  09/28/1972    DM Eye Exam  09/28/1972    HIV Screening  09/28/1977    BMI: Followup Plan  09/28/1980    Cervical Cancer Screening  09/28/1983    Colorectal Cancer Screening  09/28/2012    Influenza Vaccine  07/01/2020    DTaP,Tdap,and Td Vaccines (1 - Tdap) 09/09/2021 (Originally 9/28/1983)    HEMOGLOBIN A1C  03/02/2021    BMI: Adult  09/09/2021    HIB Vaccine  Aged Out    Hepatitis B Vaccine  Aged Out    IPV Vaccine  Aged Out    Hepatitis A Vaccine  Aged Out    Meningococcal ACWY Vaccine  Aged Out    HPV Vaccine  Aged Out       There is no immunization history on file for this patient  Tom Toledo DO   [address]  9/9/2020  5:13 PM    Parts of this note were dictated using [employer] dictation software and may have sounds-like errors due to variation in pronunciation

## 2020-09-09 NOTE — ASSESSMENT & PLAN NOTE
Lab Results   Component Value Date    HGBA1C 8 8 (A) 09/02/2020     - Pt taking her Lantus as 50 units in AM and 50 units PM and tolerating well  She states her sugars the last week on that regimen have stayed in the 200's  - Pt advised to take her fasting numbers in the AM as she has not been doing that  - Pt continues to take her Jardiance and Januvia as prescribed  - Microalbumin/Cr - 8487  - Pt is currently on Lisinopril 10 mg daily  Referral to Nephrology placed, will consider increasing dose  - Pt has Endocrine appointment on 9/11   - Pt given referral for Podiatry  - Follow up next week after Endocrine visit

## 2020-09-09 NOTE — ASSESSMENT & PLAN NOTE
- Pt with past hx of abnormal LFTs, remote hx of hospitalization with increased Ammonia and and placed on lactulose  - No records to compare   - LFTs ordered  - INR ordered  - Hepatitis C screen ordered  - Follow up in one week

## 2020-09-09 NOTE — ASSESSMENT & PLAN NOTE
-S/p cervical fusion back in [place]   -Pt had been on Hydrocodone 10 mg(Norco) 4 times per day  -Prescribed Diclofenac gel   -Pt referred to Pain management for follow up  - Please follow up with our office if your appointment with pain management is delayed and your pain is really poorly controlled

## 2020-09-09 NOTE — ASSESSMENT & PLAN NOTE
- Pt with last CMP done in ER on 8/28  No prior hx to compare  Records requested from [place]  Pt denies any decrease in urine production and says she is always drinking plenty of water  - Cr - 1 6, eGFR at 31  - Pt currently on Lisinopril 10 mg, will consider increasing dose after referral to Nephrology  - Referred to Nephrology  - CMP ordered

## 2020-09-11 ENCOUNTER — CONSULT (OUTPATIENT)
Dept: INTERNAL MEDICINE CLINIC | Facility: CLINIC | Age: 60
End: 2020-09-11
Payer: MEDICARE

## 2020-09-11 VITALS
WEIGHT: 263 LBS | DIASTOLIC BLOOD PRESSURE: 82 MMHG | HEIGHT: 68 IN | OXYGEN SATURATION: 98 % | SYSTOLIC BLOOD PRESSURE: 140 MMHG | BODY MASS INDEX: 39.86 KG/M2 | HEART RATE: 104 BPM | TEMPERATURE: 98.7 F

## 2020-09-11 DIAGNOSIS — Z79.4 TYPE 2 DIABETES MELLITUS WITH DIABETIC POLYNEUROPATHY, WITH LONG-TERM CURRENT USE OF INSULIN (HCC): Primary | ICD-10-CM

## 2020-09-11 DIAGNOSIS — E11.42 TYPE 2 DIABETES MELLITUS WITH DIABETIC POLYNEUROPATHY, WITH LONG-TERM CURRENT USE OF INSULIN (HCC): Primary | ICD-10-CM

## 2020-09-11 DIAGNOSIS — Z79.4 CURRENT USE OF INSULIN (HCC): ICD-10-CM

## 2020-09-11 PROCEDURE — 99205 OFFICE O/P NEW HI 60 MIN: CPT | Performed by: INTERNAL MEDICINE

## 2020-09-11 RX ORDER — LANCETS 30 GAUGE
EACH MISCELLANEOUS
Qty: 100 EACH | Refills: 2 | Status: SHIPPED | OUTPATIENT
Start: 2020-09-11 | End: 2020-11-10 | Stop reason: SDUPTHER

## 2020-09-11 NOTE — PATIENT INSTRUCTIONS
Please stop taking the Januvia and Jardiance  Please test your glucose level 3 times a day prior to meals  Take your short acting insulin (Humoolg) 10 units prior to meals  Lower the lantus to 40 twice a day  Start the trulicity 1 94 once a week  Remember to log your glucose levels for 2-3 weeks and send them to: [email or web address]

## 2020-09-11 NOTE — PROGRESS NOTES
Assessment/Plan:    Type 2 diabetes mellitus, with long-term current use of insulin (formerly [facility])    Lab Results   Component Value Date    HGBA1C 8 8 (A) 09/02/2020     Medications   Discontinue Jardiance due to CKD and low GFR  Discontinue Januvia in favor to Trulicity with history of CAD  Discussed about the risk of thyroid cancer and pancreatitis with this medication  Denies any history of pancreatitis  Basal insulin:decreased to 40 units BID  Prandial insulin: 10 units of Humalog 3 times before meals    SMBG frequency: 3 times daily  Stressed the importance of checking glucose level before meals    Exercise -limited due to left knee pain    Referral to Optho, podiatry,      Recommend cardiology referral based on history    Recommend vaccination HM     Rx for meter/supplies, etc   -refill  Running low         Diagnoses and all orders for this visit:    Type 2 diabetes mellitus with diabetic polyneuropathy, with long-term current use of insulin (formerly [facility])  -     Lipid panel; Future  -     Dulaglutide 0 75 MG/0 5ML SOPN; Inject 0 5 mL (0 75 mg total) under the skin once a week  -     glucose blood test strip; Use to test 3 times daily  -     OneTouch Delica Lancets 08N MISC; Use to test 3 times daily    Current use of insulin (formerly [facility])  -     Lipid panel; Future  -     Dulaglutide 0 75 MG/0 5ML SOPN; Inject 0 5 mL (0 75 mg total) under the skin once a week  -     glucose blood test strip; Use to test 3 times daily  -     OneTouch Delica Lancets 81K MISC; Use to test 3 times daily          Subjective:      Patient ID: Jose Lancaster is a 64 y o  female  46-year-old female with a history of type 2 diabetes, coronary artery disease, CKD and major depressive disorder who presents to the clinic on referral by PCP for uncontrolled diabetes  Type:  Type 2 diabetes  Onset: 27 years     Meds:   Unsure of what medication she takes  Daughter usually sets out her medication for her    As per chart review, patient is taking:   [place] 10 mg every morning  Januvia 100 mg daily  Basal insulin: Currently on 50 units BID  Was on 100 units in the morning 60 units in the evening of Lantus twice a day  Appears to have been developing multiple episodes of hypogylycemia on this regimen which she relates to inconsistent diet  Rerports about 5 episodes of this in the last 1 month  Patient has recently moved to [place] from [address] [place] [address] about 3 weeks ago  She currently is living with friends and has limited access to meals  Prandial insulin:   10 units of Humalog 3 times a day after meals  Has only been taking it about once a day because of limited meals  Blood sugars:   Frequency: Inconsistent  Checks when she remembers, usually checks 2 hours after meals  Time of the day: Usually after a meal, about once or twice a day  Range: 200 to 500 range in the last 1 month  Last night 250    Meter:  Brand: Unkown-thinks maybe ultratouch  Year obtained: 2019    Diabetes complications: Reports hisotry of multiple cardiac stents  Does complain of leg pain with ambulation  Lost a toe to infection      Ophthamology  Has not followed up with about 10 years  Does complain of worsening vision    Podiatry/Foot care  Used to follow up with a foot doctor   Last visit was about one year ago    Dentist:   Last followed up with a dentist about 2-3  No teeth on the top, about 8 teeth in the bottom    Vaccination:  Denies receiving this last Infulenza and pneumococcal          The following portions of the patient's history were reviewed and updated as appropriate: allergies, current medications, past family history, past medical history, past social history, past surgical history and problem list     Family History   Problem Relation Age of Onset    Stroke Father     Heart disease Father      Active Ambulatory Problems     Diagnosis Date Noted    Cellulitis of finger of right hand 08/26/2020    Major depressive disorder 09/02/2020    Type 2 diabetes mellitus, with long-term current use of insulin ([facility] [address] ) 09/02/2020    Anxiety 09/02/2020    CAD (coronary artery disease) 09/02/2020    Osteoarthritis of left knee 09/02/2020    Healthcare maintenance 09/02/2020    Normochromic normocytic anemia 09/09/2020    CKD (chronic kidney disease) stage 3, GFR 30-59 ml/min [facility] 09/09/2020    Abnormal LFTs 09/09/2020    S/P cervical spinal fusion 09/09/2020     Resolved Ambulatory Problems     Diagnosis Date Noted    No Resolved Ambulatory Problems     Past Medical History:   Diagnosis Date    Anemia     Chronic kidney disease     Coronary artery disease     Diabetes mellitus (HCC)     GERD (gastroesophageal reflux disease)     Hypertension     Renal disorder        Current Outpatient Medications:     allopurinol (ZYLOPRIM) 300 mg tablet, Take 300 mg by mouth daily, Disp: , Rfl:     atorvastatin (LIPITOR) 40 mg tablet, Take 1 tablet (40 mg total) by mouth daily, Disp: 90 tablet, Rfl: 3    carvedilol (COREG) 6 25 mg tablet, Take 6 25 mg by mouth 2 (two) times a day with meals Take 1/2 tablet twice daily, Disp: , Rfl:     citalopram (CeleXA) 40 mg tablet, Take 40 mg by mouth daily, Disp: , Rfl:     clonazePAM (KlonoPIN) 1 mg tablet, Take 1 mg by mouth 2 (two) times a day, Disp: , Rfl:     diclofenac sodium (VOLTAREN) 1 %, Apply 2 g topically 4 (four) times a day, Disp: 150 g, Rfl: 0    diphenhydrAMINE (BENADRYL) 25 mg tablet, Take 25 mg by mouth every 6 (six) hours as needed for itching, Disp: , Rfl:     gabapentin (NEURONTIN) 300 mg capsule, Take 600 mg by mouth 3 (three) times a day, Disp: , Rfl:     insulin glargine (LANTUS) 100 units/mL subcutaneous injection, Inject 40 Units under the skin 2 (two) times a day 40 units AM, 40 units PM, Disp: , Rfl:     insulin lispro (HumaLOG) 100 units/mL injection, Inject under the skin 3 (three) times a day before meals Between 10-20 units, sliding scale , Disp: , Rfl:     lactulose (CHRONULAC) 10 g/15 mL solution, Take 20 g by mouth daily at bedtime, Disp: , Rfl:     lisinopril (ZESTRIL) 10 mg tablet, Take 10 mg by mouth daily, Disp: , Rfl:     magnesium oxide (MAG-OX) 400 mg, Take 500 mg by mouth once , Disp: , Rfl:     OLANZapine (ZyPREXA) 5 mg tablet, Take 5 mg by mouth daily at bedtime, Disp: , Rfl:     POT BICARB-POT CHLORIDE,25MEQ, 25 MEQ TBEF, Take by mouth, Disp: , Rfl:     venlafaxine (EFFEXOR-XR) 75 mg 24 hr capsule, Take 75 mg by mouth daily, Disp: , Rfl:     doxycycline hyclate (VIBRAMYCIN) 100 mg capsule, Take 100 mg by mouth every 12 (twelve) hours, Disp: , Rfl:     Dulaglutide 0 75 MG/0 5ML SOPN, Inject 0 5 mL (0 75 mg total) under the skin once a week, Disp: 4 pen, Rfl: 2    glucose blood test strip, Use to test 3 times daily, Disp: 100 each, Rfl: 2    OneTouch Delica Lancets 39W MISC, Use to test 3 times daily, Disp: 100 each, Rfl: 2  No current facility-administered medications for this visit  Facility-Administered Medications Ordered in Other Visits:     doxycycline hyclate (VIBRAMYCIN) capsule 100 mg, 100 mg, Oral, Once, Sarah Hernandez MD    Review of Systems   Constitutional: Positive for activity change  Negative for appetite change, chills, diaphoresis, fatigue and fever  Respiratory: Positive for shortness of breath  Negative for cough, choking and wheezing  Cardiovascular: Negative for chest pain, palpitations and leg swelling  Gastrointestinal: Negative for abdominal distention, abdominal pain, diarrhea, nausea and vomiting  Musculoskeletal: Positive for arthralgias and back pain  Neurological: Positive for weakness and numbness  Negative for light-headedness and headaches  Objective:      /82 (BP Location: Left arm, Patient Position: Sitting, Cuff Size: Large)   Pulse 104   Temp 98 7 °F (37 1 °C)   Ht 5' 8" (1 727 m)   Wt 119 kg (263 lb)   SpO2 98%   BMI 39 99 kg/m²          Physical Exam  Vitals signs and nursing note reviewed     Constitutional:       General: She is not in acute distress  Appearance: She is well-developed  She is not diaphoretic  HENT:      Head: Normocephalic and atraumatic  Cardiovascular:      Rate and Rhythm: Normal rate and regular rhythm  Heart sounds: Murmur present  Pulmonary:      Effort: Pulmonary effort is normal       Breath sounds: Normal breath sounds  No wheezing or rales  Abdominal:      General: Bowel sounds are normal  There is no distension  Palpations: Abdomen is soft  Tenderness: There is no abdominal tenderness  Musculoskeletal:      Comments: Champagne bottle shaped legs  Healing ulcer on the 1st toe of the right foot  Decreased sensation bilaterally on the plantar and dorsal aspect of bilateral feet  Decreased sensation in the upper extremity involving the hands bilaterally  Skin:     General: Skin is warm and dry  Neurological:      Mental Status: She is alert and oriented to person, place, and time

## 2020-09-11 NOTE — ASSESSMENT & PLAN NOTE
Lab Results   Component Value Date    HGBA1C 8 8 (A) 09/02/2020     Medications   Discontinue Jardiance due to CKD and low GFR  Discontinue Januvia in favor to Trulicity with history of CAD  Discussed about the risk of thyroid cancer and pancreatitis with this medication  Denies any history of pancreatitis  Basal insulin:decreased to 40 units BID  Prandial insulin: 10 units of Humalog 3 times before meals    SMBG frequency: 3 times daily  Stressed the importance of checking glucose level before meals    Exercise -limited due to left knee pain    Referral to Optho, podiatry,      Recommend cardiology referral based on history    Recommend vaccination HM     Rx for meter/supplies, etc   -refill   Running low

## 2020-09-13 DIAGNOSIS — E11.42 TYPE 2 DIABETES MELLITUS WITH DIABETIC POLYNEUROPATHY, WITH LONG-TERM CURRENT USE OF INSULIN (HCC): ICD-10-CM

## 2020-09-13 DIAGNOSIS — Z79.4 CURRENT USE OF INSULIN (HCC): ICD-10-CM

## 2020-09-13 DIAGNOSIS — Z79.4 TYPE 2 DIABETES MELLITUS WITH DIABETIC POLYNEUROPATHY, WITH LONG-TERM CURRENT USE OF INSULIN (HCC): ICD-10-CM

## 2020-09-14 RX ORDER — BLOOD SUGAR DIAGNOSTIC
STRIP MISCELLANEOUS
Qty: 100 EACH | Refills: 2 | Status: SHIPPED | OUTPATIENT
Start: 2020-09-14 | End: 2020-11-10 | Stop reason: SDUPTHER

## 2020-09-15 ENCOUNTER — TELEPHONE (OUTPATIENT)
Dept: OBGYN CLINIC | Facility: CLINIC | Age: 60
End: 2020-09-15

## 2020-09-17 ENCOUNTER — TELEPHONE (OUTPATIENT)
Dept: FAMILY MEDICINE CLINIC | Facility: CLINIC | Age: 60
End: 2020-09-17

## 2020-09-22 ENCOUNTER — TELEPHONE (OUTPATIENT)
Dept: FAMILY MEDICINE CLINIC | Facility: CLINIC | Age: 60
End: 2020-09-22

## 2020-09-29 ENCOUNTER — PREP FOR PROCEDURE (OUTPATIENT)
Dept: GASTROENTEROLOGY | Facility: CLINIC | Age: 60
End: 2020-09-29

## 2020-09-29 DIAGNOSIS — Z12.11 ENCOUNTER FOR SCREENING COLONOSCOPY: Primary | ICD-10-CM

## 2020-10-08 RX ORDER — AMOXICILLIN AND CLAVULANATE POTASSIUM 875; 125 MG/1; MG/1
1 TABLET, FILM COATED ORAL DAILY
COMMUNITY
End: 2020-11-10

## 2020-10-08 RX ORDER — FLUCONAZOLE 100 MG/1
100 TABLET ORAL DAILY
COMMUNITY
End: 2020-11-10

## 2020-10-08 RX ORDER — HYDROCHLOROTHIAZIDE 12.5 MG/1
12.5 CAPSULE, GELATIN COATED ORAL DAILY
COMMUNITY
End: 2020-11-10 | Stop reason: SDUPTHER

## 2020-10-08 RX ORDER — FLUCONAZOLE 150 MG/1
150 TABLET ORAL DAILY
COMMUNITY
End: 2020-11-10

## 2020-10-08 RX ORDER — AMOXICILLIN AND CLAVULANATE POTASSIUM 500; 125 MG/1; MG/1
500 TABLET, FILM COATED ORAL DAILY
COMMUNITY
End: 2020-11-10

## 2020-10-08 RX ORDER — CLOPIDOGREL BISULFATE 75 MG/1
75 TABLET ORAL DAILY
COMMUNITY
End: 2020-11-10 | Stop reason: SDUPTHER

## 2020-10-08 RX ORDER — CLINDAMYCIN HYDROCHLORIDE 300 MG/1
300 CAPSULE ORAL DAILY
COMMUNITY
End: 2020-11-10

## 2020-10-08 RX ORDER — ASPIRIN 81 MG/1
81 TABLET ORAL DAILY
COMMUNITY
End: 2020-11-10 | Stop reason: SDUPTHER

## 2020-10-08 RX ORDER — CEPHALEXIN 500 MG/1
500 CAPSULE ORAL DAILY
COMMUNITY
End: 2020-11-10

## 2020-10-22 ENCOUNTER — TELEPHONE (OUTPATIENT)
Dept: GASTROENTEROLOGY | Facility: CLINIC | Age: 60
End: 2020-10-22

## 2020-10-27 ENCOUNTER — OFFICE VISIT (OUTPATIENT)
Dept: OBGYN CLINIC | Facility: MEDICAL CENTER | Age: 60
End: 2020-10-27
Payer: MEDICARE

## 2020-10-27 ENCOUNTER — APPOINTMENT (OUTPATIENT)
Dept: RADIOLOGY | Facility: MEDICAL CENTER | Age: 60
End: 2020-10-27
Payer: MEDICARE

## 2020-10-27 VITALS — DIASTOLIC BLOOD PRESSURE: 79 MMHG | SYSTOLIC BLOOD PRESSURE: 139 MMHG | HEART RATE: 89 BPM

## 2020-10-27 DIAGNOSIS — M17.12 PRIMARY OSTEOARTHRITIS OF LEFT KNEE: ICD-10-CM

## 2020-10-27 DIAGNOSIS — Z00.00 HEALTHCARE MAINTENANCE: ICD-10-CM

## 2020-10-27 DIAGNOSIS — M25.562 LEFT KNEE PAIN, UNSPECIFIED CHRONICITY: ICD-10-CM

## 2020-10-27 DIAGNOSIS — M25.562 LEFT KNEE PAIN, UNSPECIFIED CHRONICITY: Primary | ICD-10-CM

## 2020-10-27 DIAGNOSIS — Z01.89 ENCOUNTER FOR LOWER EXTREMITY COMPARISON IMAGING STUDY: ICD-10-CM

## 2020-10-27 PROCEDURE — 99203 OFFICE O/P NEW LOW 30 MIN: CPT | Performed by: ORTHOPAEDIC SURGERY

## 2020-10-27 PROCEDURE — 73562 X-RAY EXAM OF KNEE 3: CPT

## 2020-10-27 PROCEDURE — 73564 X-RAY EXAM KNEE 4 OR MORE: CPT

## 2020-11-10 ENCOUNTER — OFFICE VISIT (OUTPATIENT)
Dept: FAMILY MEDICINE CLINIC | Facility: CLINIC | Age: 60
End: 2020-11-10

## 2020-11-10 VITALS
RESPIRATION RATE: 18 BRPM | DIASTOLIC BLOOD PRESSURE: 80 MMHG | HEART RATE: 88 BPM | TEMPERATURE: 97.6 F | OXYGEN SATURATION: 97 % | SYSTOLIC BLOOD PRESSURE: 118 MMHG | WEIGHT: 247 LBS | BODY MASS INDEX: 37.56 KG/M2

## 2020-11-10 DIAGNOSIS — R22.0 HEAD LUMP: ICD-10-CM

## 2020-11-10 DIAGNOSIS — E11.42 TYPE 2 DIABETES MELLITUS WITH DIABETIC POLYNEUROPATHY, WITH LONG-TERM CURRENT USE OF INSULIN (HCC): Primary | ICD-10-CM

## 2020-11-10 DIAGNOSIS — R79.89 ABNORMAL LFTS: ICD-10-CM

## 2020-11-10 DIAGNOSIS — L03.011 CELLULITIS OF RIGHT FINGER: ICD-10-CM

## 2020-11-10 DIAGNOSIS — Z79.4 TYPE 2 DIABETES MELLITUS WITH DIABETIC POLYNEUROPATHY, WITH LONG-TERM CURRENT USE OF INSULIN (HCC): Primary | ICD-10-CM

## 2020-11-10 DIAGNOSIS — Z00.00 HEALTHCARE MAINTENANCE: ICD-10-CM

## 2020-11-10 DIAGNOSIS — N18.32 STAGE 3B CHRONIC KIDNEY DISEASE (HCC): ICD-10-CM

## 2020-11-10 DIAGNOSIS — Z79.4 CURRENT USE OF INSULIN (HCC): ICD-10-CM

## 2020-11-10 DIAGNOSIS — Z23 NEED FOR VACCINATION: ICD-10-CM

## 2020-11-10 DIAGNOSIS — I25.10 CORONARY ARTERY DISEASE INVOLVING NATIVE HEART WITHOUT ANGINA PECTORIS, UNSPECIFIED VESSEL OR LESION TYPE: ICD-10-CM

## 2020-11-10 PROCEDURE — 99205 OFFICE O/P NEW HI 60 MIN: CPT | Performed by: NURSE PRACTITIONER

## 2020-11-10 PROCEDURE — G0008 ADMIN INFLUENZA VIRUS VAC: HCPCS | Performed by: NURSE PRACTITIONER

## 2020-11-10 PROCEDURE — 90682 RIV4 VACC RECOMBINANT DNA IM: CPT | Performed by: NURSE PRACTITIONER

## 2020-11-10 RX ORDER — GABAPENTIN 300 MG/1
600 CAPSULE ORAL 3 TIMES DAILY
Qty: 180 CAPSULE | Refills: 0 | Status: SHIPPED | OUTPATIENT
Start: 2020-11-10 | End: 2020-11-25 | Stop reason: SDUPTHER

## 2020-11-10 RX ORDER — CITALOPRAM 40 MG/1
40 TABLET ORAL DAILY
Qty: 30 TABLET | Refills: 0 | Status: SHIPPED | OUTPATIENT
Start: 2020-11-10 | End: 2020-11-25 | Stop reason: SDUPTHER

## 2020-11-10 RX ORDER — CARVEDILOL 6.25 MG/1
6.25 TABLET ORAL 2 TIMES DAILY WITH MEALS
Qty: 60 TABLET | Refills: 0 | Status: SHIPPED | OUTPATIENT
Start: 2020-11-10 | End: 2020-11-25 | Stop reason: SDUPTHER

## 2020-11-10 RX ORDER — HYDROCHLOROTHIAZIDE 12.5 MG/1
12.5 CAPSULE, GELATIN COATED ORAL DAILY
Qty: 30 CAPSULE | Refills: 0 | Status: SHIPPED | OUTPATIENT
Start: 2020-11-10 | End: 2020-11-25 | Stop reason: SDUPTHER

## 2020-11-10 RX ORDER — LANCETS 30 GAUGE
EACH MISCELLANEOUS
Qty: 100 EACH | Refills: 2 | Status: SHIPPED | OUTPATIENT
Start: 2020-11-10 | End: 2021-02-15

## 2020-11-10 RX ORDER — INSULIN GLARGINE 100 [IU]/ML
40 INJECTION, SOLUTION SUBCUTANEOUS 2 TIMES DAILY
Qty: 10 ML | Refills: 0 | Status: SHIPPED | OUTPATIENT
Start: 2020-11-10 | End: 2020-11-25

## 2020-11-10 RX ORDER — ASPIRIN 81 MG/1
81 TABLET ORAL ONCE
Qty: 30 TABLET | Refills: 0 | Status: SHIPPED | OUTPATIENT
Start: 2020-11-10 | End: 2020-11-25 | Stop reason: SDUPTHER

## 2020-11-10 RX ORDER — CLOPIDOGREL BISULFATE 75 MG/1
75 TABLET ORAL DAILY
Qty: 30 TABLET | Refills: 0 | Status: SHIPPED | OUTPATIENT
Start: 2020-11-10 | End: 2020-11-25 | Stop reason: SDUPTHER

## 2020-11-10 RX ORDER — ATORVASTATIN CALCIUM 40 MG/1
40 TABLET, FILM COATED ORAL DAILY
Qty: 30 TABLET | Refills: 0 | Status: SHIPPED | OUTPATIENT
Start: 2020-11-10 | End: 2020-11-25 | Stop reason: SDUPTHER

## 2020-11-10 RX ORDER — BLOOD SUGAR DIAGNOSTIC
1 STRIP MISCELLANEOUS DAILY
Qty: 100 EACH | Refills: 2 | Status: SHIPPED | OUTPATIENT
Start: 2020-11-10 | End: 2021-01-21 | Stop reason: SDUPTHER

## 2020-11-10 RX ORDER — LISINOPRIL 10 MG/1
10 TABLET ORAL DAILY
Qty: 30 TABLET | Refills: 0 | Status: SHIPPED | OUTPATIENT
Start: 2020-11-10 | End: 2020-11-25 | Stop reason: SDUPTHER

## 2020-11-11 ENCOUNTER — PATIENT OUTREACH (OUTPATIENT)
Dept: FAMILY MEDICINE CLINIC | Facility: CLINIC | Age: 60
End: 2020-11-11

## 2020-11-11 PROBLEM — R22.0 HEAD LUMP: Status: ACTIVE | Noted: 2020-11-11

## 2020-11-11 PROBLEM — Z78.9 NEED FOR FOLLOW-UP BY SOCIAL WORKER: Status: ACTIVE | Noted: 2020-11-11

## 2020-11-11 RX ORDER — DULAGLUTIDE 0.75 MG/.5ML
0.75 INJECTION, SOLUTION SUBCUTANEOUS WEEKLY
Qty: 4 PEN | Refills: 1 | Status: SHIPPED | OUTPATIENT
Start: 2020-11-11 | End: 2020-11-25 | Stop reason: SDUPTHER

## 2020-11-17 ENCOUNTER — PATIENT OUTREACH (OUTPATIENT)
Dept: FAMILY MEDICINE CLINIC | Facility: CLINIC | Age: 60
End: 2020-11-17

## 2020-11-17 DIAGNOSIS — E11.42 TYPE 2 DIABETES MELLITUS WITH DIABETIC POLYNEUROPATHY, WITH LONG-TERM CURRENT USE OF INSULIN (HCC): Primary | ICD-10-CM

## 2020-11-17 DIAGNOSIS — Z79.4 TYPE 2 DIABETES MELLITUS WITH DIABETIC POLYNEUROPATHY, WITH LONG-TERM CURRENT USE OF INSULIN (HCC): Primary | ICD-10-CM

## 2020-11-18 ENCOUNTER — PATIENT OUTREACH (OUTPATIENT)
Dept: FAMILY MEDICINE CLINIC | Facility: CLINIC | Age: 60
End: 2020-11-18

## 2020-11-18 DIAGNOSIS — E11.42 TYPE 2 DIABETES MELLITUS WITH DIABETIC POLYNEUROPATHY, WITH LONG-TERM CURRENT USE OF INSULIN (HCC): Primary | ICD-10-CM

## 2020-11-18 DIAGNOSIS — Z79.4 TYPE 2 DIABETES MELLITUS WITH DIABETIC POLYNEUROPATHY, WITH LONG-TERM CURRENT USE OF INSULIN (HCC): Primary | ICD-10-CM

## 2020-11-20 ENCOUNTER — HOSPITAL ENCOUNTER (OUTPATIENT)
Dept: ULTRASOUND IMAGING | Facility: HOSPITAL | Age: 60
Discharge: HOME | End: 2020-11-20
Payer: MEDICARE

## 2020-11-20 DIAGNOSIS — R22.0 HEAD LUMP: ICD-10-CM

## 2020-11-20 PROCEDURE — 76536 US EXAM OF HEAD AND NECK: CPT

## 2020-11-23 ENCOUNTER — PATIENT OUTREACH (OUTPATIENT)
Dept: FAMILY MEDICINE CLINIC | Facility: CLINIC | Age: 60
End: 2020-11-23

## 2020-11-24 ENCOUNTER — HOSPITAL ENCOUNTER (OUTPATIENT)
Dept: MAMMOGRAPHY | Facility: MEDICAL CENTER | Age: 60
Discharge: HOME | End: 2020-11-24
Payer: MEDICARE

## 2020-11-24 VITALS — HEIGHT: 68 IN | WEIGHT: 247 LBS | BODY MASS INDEX: 37.44 KG/M2

## 2020-11-24 DIAGNOSIS — Z12.31 ENCOUNTER FOR SCREENING MAMMOGRAM FOR MALIGNANT NEOPLASM OF BREAST: ICD-10-CM

## 2020-11-24 DIAGNOSIS — M17.12 PRIMARY OSTEOARTHRITIS OF LEFT KNEE: ICD-10-CM

## 2020-11-24 DIAGNOSIS — E11.42 TYPE 2 DIABETES MELLITUS WITH DIABETIC POLYNEUROPATHY, WITH LONG-TERM CURRENT USE OF INSULIN (HCC): ICD-10-CM

## 2020-11-24 DIAGNOSIS — Z79.4 TYPE 2 DIABETES MELLITUS WITH DIABETIC POLYNEUROPATHY, WITH LONG-TERM CURRENT USE OF INSULIN (HCC): ICD-10-CM

## 2020-11-24 DIAGNOSIS — Z79.4 CURRENT USE OF INSULIN (HCC): ICD-10-CM

## 2020-11-24 DIAGNOSIS — I25.10 CORONARY ARTERY DISEASE INVOLVING NATIVE HEART WITHOUT ANGINA PECTORIS, UNSPECIFIED VESSEL OR LESION TYPE: ICD-10-CM

## 2020-11-24 DIAGNOSIS — N18.32 STAGE 3B CHRONIC KIDNEY DISEASE (HCC): ICD-10-CM

## 2020-11-24 DIAGNOSIS — Z12.39 BREAST CANCER SCREENING: ICD-10-CM

## 2020-11-24 PROCEDURE — 77063 BREAST TOMOSYNTHESIS BI: CPT

## 2020-11-24 PROCEDURE — 77067 SCR MAMMO BI INCL CAD: CPT

## 2020-11-24 RX ORDER — INSULIN GLARGINE 100 [IU]/ML
40 INJECTION, SOLUTION SUBCUTANEOUS 2 TIMES DAILY
Qty: 10 ML | Refills: 0 | Status: CANCELLED | OUTPATIENT
Start: 2020-11-24

## 2020-11-25 ENCOUNTER — TELEMEDICINE (OUTPATIENT)
Dept: FAMILY MEDICINE CLINIC | Facility: CLINIC | Age: 60
End: 2020-11-25

## 2020-11-25 VITALS
HEART RATE: 82 BPM | WEIGHT: 266 LBS | DIASTOLIC BLOOD PRESSURE: 80 MMHG | TEMPERATURE: 97.6 F | SYSTOLIC BLOOD PRESSURE: 140 MMHG | BODY MASS INDEX: 40.45 KG/M2 | OXYGEN SATURATION: 98 %

## 2020-11-25 DIAGNOSIS — S16.1XXA STRAIN OF NECK MUSCLE, INITIAL ENCOUNTER: ICD-10-CM

## 2020-11-25 DIAGNOSIS — N18.32 STAGE 3B CHRONIC KIDNEY DISEASE (HCC): ICD-10-CM

## 2020-11-25 DIAGNOSIS — L97.515 DIABETIC ULCER OF TOE OF RIGHT FOOT ASSOCIATED WITH TYPE 2 DIABETES MELLITUS, WITH MUSCLE INVOLVEMENT WITHOUT EVIDENCE OF NECROSIS (HCC): ICD-10-CM

## 2020-11-25 DIAGNOSIS — Z79.4 CURRENT USE OF INSULIN (HCC): ICD-10-CM

## 2020-11-25 DIAGNOSIS — I25.10 CORONARY ARTERY DISEASE INVOLVING NATIVE HEART WITHOUT ANGINA PECTORIS, UNSPECIFIED VESSEL OR LESION TYPE: ICD-10-CM

## 2020-11-25 DIAGNOSIS — E11.42 TYPE 2 DIABETES MELLITUS WITH DIABETIC POLYNEUROPATHY, WITH LONG-TERM CURRENT USE OF INSULIN (HCC): ICD-10-CM

## 2020-11-25 DIAGNOSIS — R22.0 HEAD LUMP: Primary | ICD-10-CM

## 2020-11-25 DIAGNOSIS — M17.12 PRIMARY OSTEOARTHRITIS OF LEFT KNEE: ICD-10-CM

## 2020-11-25 DIAGNOSIS — E11.621 DIABETIC ULCER OF TOE OF RIGHT FOOT ASSOCIATED WITH TYPE 2 DIABETES MELLITUS, WITH MUSCLE INVOLVEMENT WITHOUT EVIDENCE OF NECROSIS (HCC): ICD-10-CM

## 2020-11-25 DIAGNOSIS — Z79.4 TYPE 2 DIABETES MELLITUS WITH DIABETIC POLYNEUROPATHY, WITH LONG-TERM CURRENT USE OF INSULIN (HCC): ICD-10-CM

## 2020-11-25 PROCEDURE — 99214 OFFICE O/P EST MOD 30 MIN: CPT | Performed by: NURSE PRACTITIONER

## 2020-11-25 RX ORDER — INSULIN GLARGINE 100 [IU]/ML
40 INJECTION, SOLUTION SUBCUTANEOUS 2 TIMES DAILY
Qty: 10 ML | Refills: 0 | Status: CANCELLED | OUTPATIENT
Start: 2020-11-25

## 2020-11-25 RX ORDER — HYDROCODONE BITARTRATE AND ACETAMINOPHEN 5; 325 MG/1; MG/1
1 TABLET ORAL
Qty: 30 TABLET | Refills: 0 | Status: SHIPPED | OUTPATIENT
Start: 2020-11-25 | End: 2021-01-18 | Stop reason: SDUPTHER

## 2020-11-25 RX ORDER — CARVEDILOL 6.25 MG/1
6.25 TABLET ORAL 2 TIMES DAILY WITH MEALS
Qty: 60 TABLET | Refills: 0 | Status: ON HOLD | OUTPATIENT
Start: 2020-11-25 | End: 2020-12-21

## 2020-11-25 RX ORDER — INSULIN GLARGINE 100 [IU]/ML
45 INJECTION, SOLUTION SUBCUTANEOUS 2 TIMES DAILY
Qty: 30 ML | Refills: 1 | Status: SHIPPED | OUTPATIENT
Start: 2020-11-25 | End: 2020-12-21

## 2020-11-25 RX ORDER — LISINOPRIL 10 MG/1
10 TABLET ORAL DAILY
Qty: 30 TABLET | Refills: 0 | Status: SHIPPED | OUTPATIENT
Start: 2020-11-25 | End: 2020-12-05

## 2020-11-25 RX ORDER — CLOPIDOGREL BISULFATE 75 MG/1
75 TABLET ORAL DAILY
Qty: 30 TABLET | Refills: 0 | Status: SHIPPED | OUTPATIENT
Start: 2020-11-25 | End: 2021-01-05

## 2020-11-25 RX ORDER — CARVEDILOL 6.25 MG/1
6.25 TABLET ORAL 2 TIMES DAILY WITH MEALS
Qty: 60 TABLET | Refills: 0 | Status: SHIPPED | OUTPATIENT
Start: 2020-11-25 | End: 2020-11-25

## 2020-11-25 RX ORDER — GABAPENTIN 300 MG/1
600 CAPSULE ORAL 3 TIMES DAILY
Qty: 180 CAPSULE | Refills: 0 | Status: SHIPPED | OUTPATIENT
Start: 2020-11-25 | End: 2020-12-15 | Stop reason: SDUPTHER

## 2020-11-25 RX ORDER — CITALOPRAM 40 MG/1
40 TABLET ORAL DAILY
Qty: 30 TABLET | Refills: 0 | Status: SHIPPED | OUTPATIENT
Start: 2020-11-25 | End: 2020-12-05

## 2020-11-25 RX ORDER — POVIDONE-IODINE 10 MG/ML
SOLUTION TOPICAL ONCE AS NEEDED
Qty: 100 EACH | Refills: 0 | Status: SHIPPED | OUTPATIENT
Start: 2020-11-25 | End: 2021-06-04

## 2020-11-25 RX ORDER — ASPIRIN 81 MG/1
81 TABLET ORAL ONCE
Qty: 30 TABLET | Refills: 0 | Status: SHIPPED | OUTPATIENT
Start: 2020-11-25 | End: 2020-12-05

## 2020-11-25 RX ORDER — HYDROCODONE BITARTRATE AND ACETAMINOPHEN 5; 325 MG/1; MG/1
1 TABLET ORAL
Qty: 30 TABLET | Refills: 0 | OUTPATIENT
Start: 2020-11-25

## 2020-11-25 RX ORDER — ALLOPURINOL 300 MG/1
300 TABLET ORAL DAILY
Status: CANCELLED | OUTPATIENT
Start: 2020-11-25

## 2020-11-25 RX ORDER — DULAGLUTIDE 0.75 MG/.5ML
0.75 INJECTION, SOLUTION SUBCUTANEOUS WEEKLY
Qty: 4 PEN | Refills: 1 | Status: SHIPPED | OUTPATIENT
Start: 2020-11-25 | End: 2021-01-18

## 2020-11-25 RX ORDER — ATORVASTATIN CALCIUM 40 MG/1
40 TABLET, FILM COATED ORAL DAILY
Qty: 30 TABLET | Refills: 0 | Status: ON HOLD | OUTPATIENT
Start: 2020-11-25 | End: 2020-12-21

## 2020-11-25 RX ORDER — HYDROCHLOROTHIAZIDE 12.5 MG/1
12.5 CAPSULE, GELATIN COATED ORAL DAILY
Qty: 30 CAPSULE | Refills: 0 | Status: SHIPPED | OUTPATIENT
Start: 2020-11-25 | End: 2021-01-05

## 2020-12-01 ENCOUNTER — PATIENT OUTREACH (OUTPATIENT)
Dept: FAMILY MEDICINE CLINIC | Facility: CLINIC | Age: 60
End: 2020-12-01

## 2020-12-03 ENCOUNTER — PATIENT OUTREACH (OUTPATIENT)
Dept: FAMILY MEDICINE CLINIC | Facility: CLINIC | Age: 60
End: 2020-12-03

## 2020-12-05 DIAGNOSIS — E11.42 TYPE 2 DIABETES MELLITUS WITH DIABETIC POLYNEUROPATHY, WITH LONG-TERM CURRENT USE OF INSULIN (HCC): ICD-10-CM

## 2020-12-05 DIAGNOSIS — Z79.4 TYPE 2 DIABETES MELLITUS WITH DIABETIC POLYNEUROPATHY, WITH LONG-TERM CURRENT USE OF INSULIN (HCC): ICD-10-CM

## 2020-12-05 DIAGNOSIS — I25.10 CORONARY ARTERY DISEASE INVOLVING NATIVE HEART WITHOUT ANGINA PECTORIS, UNSPECIFIED VESSEL OR LESION TYPE: ICD-10-CM

## 2020-12-05 DIAGNOSIS — N18.32 STAGE 3B CHRONIC KIDNEY DISEASE (HCC): ICD-10-CM

## 2020-12-05 DIAGNOSIS — Z79.4 CURRENT USE OF INSULIN (HCC): ICD-10-CM

## 2020-12-05 RX ORDER — CITALOPRAM 40 MG/1
TABLET ORAL
Qty: 30 TABLET | Refills: 0 | Status: SHIPPED | OUTPATIENT
Start: 2020-12-05 | End: 2021-01-05

## 2020-12-05 RX ORDER — LISINOPRIL 10 MG/1
TABLET ORAL
Qty: 30 TABLET | Refills: 0 | Status: SHIPPED | OUTPATIENT
Start: 2020-12-05 | End: 2020-12-18 | Stop reason: SDUPTHER

## 2020-12-05 RX ORDER — ASPIRIN 81 MG/1
TABLET, COATED ORAL
Qty: 30 TABLET | Refills: 0 | Status: SHIPPED | OUTPATIENT
Start: 2020-12-05 | End: 2021-01-05

## 2020-12-07 ENCOUNTER — PATIENT OUTREACH (OUTPATIENT)
Dept: FAMILY MEDICINE CLINIC | Facility: CLINIC | Age: 60
End: 2020-12-07

## 2020-12-07 RX ORDER — CLOPIDOGREL BISULFATE 75 MG/1
TABLET ORAL
Qty: 30 TABLET | Refills: 0 | OUTPATIENT
Start: 2020-12-07

## 2020-12-07 RX ORDER — HYDROCHLOROTHIAZIDE 12.5 MG/1
CAPSULE, GELATIN COATED ORAL
Qty: 30 CAPSULE | Refills: 0 | OUTPATIENT
Start: 2020-12-07

## 2020-12-08 ENCOUNTER — CONSULT (OUTPATIENT)
Dept: SURGERY | Facility: CLINIC | Age: 60
End: 2020-12-08
Payer: MEDICARE

## 2020-12-08 VITALS
DIASTOLIC BLOOD PRESSURE: 86 MMHG | WEIGHT: 281 LBS | TEMPERATURE: 98.2 F | HEIGHT: 68 IN | SYSTOLIC BLOOD PRESSURE: 152 MMHG | BODY MASS INDEX: 42.59 KG/M2 | HEART RATE: 83 BPM

## 2020-12-08 DIAGNOSIS — D23.4 DERMOID CYST OF SCALP: Primary | ICD-10-CM

## 2020-12-08 DIAGNOSIS — R22.0 HEAD LUMP: ICD-10-CM

## 2020-12-08 PROCEDURE — 99204 OFFICE O/P NEW MOD 45 MIN: CPT | Performed by: SPECIALIST

## 2020-12-08 RX ORDER — LISINOPRIL AND HYDROCHLOROTHIAZIDE 12.5; 1 MG/1; MG/1
TABLET ORAL DAILY
COMMUNITY
End: 2020-12-15 | Stop reason: ALTCHOICE

## 2020-12-08 RX ORDER — RANITIDINE 300 MG/1
300 TABLET ORAL
COMMUNITY
End: 2020-12-15 | Stop reason: ALTCHOICE

## 2020-12-08 RX ORDER — INSULIN GLARGINE 100 [IU]/ML
INJECTION, SOLUTION SUBCUTANEOUS
COMMUNITY
End: 2020-12-18

## 2020-12-08 RX ORDER — OLANZAPINE 5 MG/1
5 TABLET ORAL DAILY
COMMUNITY
End: 2020-12-15 | Stop reason: ALTCHOICE

## 2020-12-08 RX ORDER — GABAPENTIN 600 MG/1
600 TABLET ORAL 3 TIMES DAILY
COMMUNITY
End: 2021-01-18 | Stop reason: SDUPTHER

## 2020-12-08 RX ORDER — OXYCODONE HYDROCHLORIDE 10 MG/1
10 TABLET ORAL EVERY 6 HOURS PRN
COMMUNITY
End: 2020-12-15 | Stop reason: ALTCHOICE

## 2020-12-08 RX ORDER — CITALOPRAM 40 MG/1
TABLET ORAL DAILY
COMMUNITY
End: 2020-12-15 | Stop reason: SDUPTHER

## 2020-12-08 RX ORDER — CARVEDILOL 3.12 MG/1
3.12 TABLET ORAL 2 TIMES DAILY
COMMUNITY
End: 2020-12-15 | Stop reason: ALTCHOICE

## 2020-12-08 RX ORDER — NITROGLYCERIN 0.4 MG/1
TABLET SUBLINGUAL
Status: ON HOLD | COMMUNITY
End: 2020-12-21

## 2020-12-08 RX ORDER — DIPHENHYDRAMINE HCL 25 MG
25 CAPSULE ORAL EVERY 4 HOURS PRN
COMMUNITY
End: 2022-03-29

## 2020-12-08 RX ORDER — CLONAZEPAM 1 MG/1
TABLET ORAL DAILY
COMMUNITY
End: 2020-12-15 | Stop reason: ALTCHOICE

## 2020-12-08 RX ORDER — HYDROCODONE BITARTRATE AND ACETAMINOPHEN 10; 325 MG/1; MG/1
TABLET ORAL
COMMUNITY
End: 2020-12-18

## 2020-12-08 RX ORDER — ATORVASTATIN CALCIUM 20 MG/1
40 TABLET, FILM COATED ORAL DAILY
COMMUNITY
End: 2020-12-15 | Stop reason: SDUPTHER

## 2020-12-09 ENCOUNTER — PATIENT OUTREACH (OUTPATIENT)
Dept: FAMILY MEDICINE CLINIC | Facility: CLINIC | Age: 60
End: 2020-12-09

## 2020-12-10 ENCOUNTER — TELEPHONE (OUTPATIENT)
Dept: FAMILY MEDICINE CLINIC | Facility: CLINIC | Age: 60
End: 2020-12-10

## 2020-12-11 ENCOUNTER — PATIENT OUTREACH (OUTPATIENT)
Dept: FAMILY MEDICINE CLINIC | Facility: CLINIC | Age: 60
End: 2020-12-11

## 2020-12-11 DIAGNOSIS — Z78.9 NEEDS ASSISTANCE WITH COMMUNITY RESOURCES: Primary | ICD-10-CM

## 2020-12-13 ENCOUNTER — NURSE TRIAGE (OUTPATIENT)
Dept: OTHER | Facility: OTHER | Age: 60
End: 2020-12-13

## 2020-12-13 DIAGNOSIS — E11.42 TYPE 2 DIABETES MELLITUS WITH DIABETIC POLYNEUROPATHY, WITH LONG-TERM CURRENT USE OF INSULIN (HCC): Primary | ICD-10-CM

## 2020-12-13 DIAGNOSIS — Z79.4 TYPE 2 DIABETES MELLITUS WITH DIABETIC POLYNEUROPATHY, WITH LONG-TERM CURRENT USE OF INSULIN (HCC): Primary | ICD-10-CM

## 2020-12-14 ENCOUNTER — OFFICE VISIT (OUTPATIENT)
Dept: FAMILY MEDICINE CLINIC | Facility: CLINIC | Age: 60
End: 2020-12-14

## 2020-12-14 VITALS
SYSTOLIC BLOOD PRESSURE: 170 MMHG | WEIGHT: 288 LBS | RESPIRATION RATE: 16 BRPM | HEIGHT: 68 IN | TEMPERATURE: 98 F | OXYGEN SATURATION: 95 % | HEART RATE: 82 BPM | BODY MASS INDEX: 43.65 KG/M2 | DIASTOLIC BLOOD PRESSURE: 90 MMHG

## 2020-12-14 DIAGNOSIS — Z79.4 TYPE 2 DIABETES MELLITUS WITH FOOT ULCER, WITH LONG-TERM CURRENT USE OF INSULIN (HCC): Primary | ICD-10-CM

## 2020-12-14 DIAGNOSIS — L97.515 DIABETIC ULCER OF TOE OF RIGHT FOOT ASSOCIATED WITH TYPE 2 DIABETES MELLITUS, WITH MUSCLE INVOLVEMENT WITHOUT EVIDENCE OF NECROSIS (HCC): ICD-10-CM

## 2020-12-14 DIAGNOSIS — E11.621 DIABETIC ULCER OF TOE OF RIGHT FOOT ASSOCIATED WITH TYPE 2 DIABETES MELLITUS, WITH MUSCLE INVOLVEMENT WITHOUT EVIDENCE OF NECROSIS (HCC): ICD-10-CM

## 2020-12-14 DIAGNOSIS — L97.509 TYPE 2 DIABETES MELLITUS WITH FOOT ULCER, WITH LONG-TERM CURRENT USE OF INSULIN (HCC): Primary | ICD-10-CM

## 2020-12-14 DIAGNOSIS — E11.621 TYPE 2 DIABETES MELLITUS WITH FOOT ULCER, WITH LONG-TERM CURRENT USE OF INSULIN (HCC): Primary | ICD-10-CM

## 2020-12-14 RX ORDER — SYRING-NEEDL,DISP,INSUL,0.3 ML 31GX15/64"
SYRINGE, EMPTY DISPOSABLE MISCELLANEOUS
Qty: 100 EACH | Refills: 2 | Status: SHIPPED | OUTPATIENT
Start: 2020-12-14 | End: 2021-01-20 | Stop reason: SDUPTHER

## 2020-12-16 ENCOUNTER — ANESTHESIA EVENT (OUTPATIENT)
Dept: PERIOP | Facility: HOSPITAL | Age: 60
End: 2020-12-16
Payer: MEDICARE

## 2020-12-16 ENCOUNTER — PATIENT OUTREACH (OUTPATIENT)
Dept: FAMILY MEDICINE CLINIC | Facility: CLINIC | Age: 60
End: 2020-12-16

## 2020-12-17 ENCOUNTER — PATIENT OUTREACH (OUTPATIENT)
Dept: FAMILY MEDICINE CLINIC | Facility: CLINIC | Age: 60
End: 2020-12-17

## 2020-12-18 ENCOUNTER — LAB (OUTPATIENT)
Dept: LAB | Facility: HOSPITAL | Age: 60
End: 2020-12-18
Payer: MEDICARE

## 2020-12-18 ENCOUNTER — CONSULT (OUTPATIENT)
Dept: CARDIOLOGY CLINIC | Facility: CLINIC | Age: 60
End: 2020-12-18
Payer: MEDICARE

## 2020-12-18 VITALS
DIASTOLIC BLOOD PRESSURE: 80 MMHG | SYSTOLIC BLOOD PRESSURE: 140 MMHG | BODY MASS INDEX: 42.44 KG/M2 | TEMPERATURE: 97.1 F | HEIGHT: 68 IN | WEIGHT: 280 LBS | HEART RATE: 76 BPM

## 2020-12-18 DIAGNOSIS — D23.4 DERMOID CYST OF SCALP: ICD-10-CM

## 2020-12-18 DIAGNOSIS — I25.10 CORONARY ARTERY DISEASE INVOLVING NATIVE HEART WITHOUT ANGINA PECTORIS, UNSPECIFIED VESSEL OR LESION TYPE: ICD-10-CM

## 2020-12-18 DIAGNOSIS — N18.32 STAGE 3B CHRONIC KIDNEY DISEASE (HCC): ICD-10-CM

## 2020-12-18 DIAGNOSIS — Z79.4 TYPE 2 DIABETES MELLITUS WITH DIABETIC POLYNEUROPATHY, WITH LONG-TERM CURRENT USE OF INSULIN (HCC): ICD-10-CM

## 2020-12-18 DIAGNOSIS — I10 HYPERTENSION, UNSPECIFIED TYPE: ICD-10-CM

## 2020-12-18 DIAGNOSIS — I25.10 CORONARY ARTERY DISEASE INVOLVING NATIVE HEART WITHOUT ANGINA PECTORIS, UNSPECIFIED VESSEL OR LESION TYPE: Primary | ICD-10-CM

## 2020-12-18 DIAGNOSIS — E11.42 TYPE 2 DIABETES MELLITUS WITH DIABETIC POLYNEUROPATHY, WITH LONG-TERM CURRENT USE OF INSULIN (HCC): ICD-10-CM

## 2020-12-18 DIAGNOSIS — Z79.4 CURRENT USE OF INSULIN (HCC): ICD-10-CM

## 2020-12-18 DIAGNOSIS — R79.89 ABNORMAL LFTS: ICD-10-CM

## 2020-12-18 DIAGNOSIS — R94.31 ABNORMAL EKG: ICD-10-CM

## 2020-12-18 LAB
ALBUMIN SERPL BCP-MCNC: 3.3 G/DL (ref 3–5.2)
ALP SERPL-CCNC: 116 U/L (ref 43–122)
ALT SERPL W P-5'-P-CCNC: 15 U/L (ref 9–52)
AMMONIA PLAS-SCNC: <9 UMOL/L (ref 9–33)
ANION GAP SERPL CALCULATED.3IONS-SCNC: 5 MMOL/L (ref 5–14)
AST SERPL W P-5'-P-CCNC: 20 U/L (ref 14–36)
BACTERIA UR QL AUTO: ABNORMAL /HPF
BILIRUB SERPL-MCNC: 0.7 MG/DL
BILIRUB UR QL STRIP: NEGATIVE
BUN SERPL-MCNC: 34 MG/DL (ref 5–25)
CALCIUM ALBUM COR SERPL-MCNC: 9.7 MG/DL (ref 8.3–10.1)
CALCIUM SERPL-MCNC: 9.1 MG/DL (ref 8.4–10.2)
CHLORIDE SERPL-SCNC: 105 MMOL/L (ref 97–108)
CHOLEST SERPL-MCNC: 199 MG/DL
CLARITY UR: CLEAR
CO2 SERPL-SCNC: 31 MMOL/L (ref 22–30)
COLOR UR: ABNORMAL
CREAT SERPL-MCNC: 1.4 MG/DL (ref 0.6–1.2)
GFR SERPL CREATININE-BSD FRML MDRD: 41 ML/MIN/1.73SQ M
GLUCOSE P FAST SERPL-MCNC: 165 MG/DL (ref 70–99)
GLUCOSE UR STRIP-MCNC: ABNORMAL MG/DL
HDLC SERPL-MCNC: 45 MG/DL
HGB UR QL STRIP.AUTO: 25
KETONES UR STRIP-MCNC: NEGATIVE MG/DL
LDLC SERPL CALC-MCNC: 122 MG/DL
LEUKOCYTE ESTERASE UR QL STRIP: NEGATIVE
NITRITE UR QL STRIP: NEGATIVE
NON-SQ EPI CELLS URNS QL MICRO: ABNORMAL /HPF
NONHDLC SERPL-MCNC: 154 MG/DL
PH UR STRIP.AUTO: 6.5 [PH]
POTASSIUM SERPL-SCNC: 4.6 MMOL/L (ref 3.6–5)
PROT SERPL-MCNC: 6.2 G/DL (ref 5.9–8.4)
PROT UR STRIP-MCNC: >=500 MG/DL
RBC #/AREA URNS AUTO: ABNORMAL /HPF
SODIUM SERPL-SCNC: 141 MMOL/L (ref 137–147)
SP GR UR STRIP.AUTO: 1.01 (ref 1–1.04)
TRIGL SERPL-MCNC: 162 MG/DL
TSH SERPL DL<=0.05 MIU/L-ACNC: 3.71 UIU/ML (ref 0.47–4.68)
UROBILINOGEN UA: NEGATIVE MG/DL
WBC #/AREA URNS AUTO: ABNORMAL /HPF

## 2020-12-18 PROCEDURE — 81003 URINALYSIS AUTO W/O SCOPE: CPT | Performed by: SPECIALIST

## 2020-12-18 PROCEDURE — 82570 ASSAY OF URINE CREATININE: CPT | Performed by: NURSE PRACTITIONER

## 2020-12-18 PROCEDURE — 99204 OFFICE O/P NEW MOD 45 MIN: CPT

## 2020-12-18 PROCEDURE — 84443 ASSAY THYROID STIM HORMONE: CPT

## 2020-12-18 PROCEDURE — 82140 ASSAY OF AMMONIA: CPT

## 2020-12-18 PROCEDURE — 82043 UR ALBUMIN QUANTITATIVE: CPT | Performed by: NURSE PRACTITIONER

## 2020-12-18 PROCEDURE — 83036 HEMOGLOBIN GLYCOSYLATED A1C: CPT

## 2020-12-18 PROCEDURE — 80061 LIPID PANEL: CPT

## 2020-12-18 PROCEDURE — 80053 COMPREHEN METABOLIC PANEL: CPT

## 2020-12-18 PROCEDURE — 36415 COLL VENOUS BLD VENIPUNCTURE: CPT

## 2020-12-18 PROCEDURE — 81001 URINALYSIS AUTO W/SCOPE: CPT | Performed by: SPECIALIST

## 2020-12-18 PROCEDURE — 93000 ELECTROCARDIOGRAM COMPLETE: CPT

## 2020-12-18 RX ORDER — LISINOPRIL 20 MG/1
20 TABLET ORAL DAILY
Qty: 30 TABLET | Refills: 6 | Status: SHIPPED | OUTPATIENT
Start: 2020-12-18 | End: 2021-01-05 | Stop reason: SDUPTHER

## 2020-12-19 LAB
CREAT UR-MCNC: 41.1 MG/DL
EST. AVERAGE GLUCOSE BLD GHB EST-MCNC: 306 MG/DL
HBA1C MFR BLD: 12.3 %
MICROALBUMIN UR-MCNC: 3340 MG/L (ref 0–20)
MICROALBUMIN/CREAT 24H UR: 8127 MG/G CREATININE (ref 0–30)

## 2020-12-20 DIAGNOSIS — Z79.4 TYPE 2 DIABETES MELLITUS WITH DIABETIC POLYNEUROPATHY, WITH LONG-TERM CURRENT USE OF INSULIN (HCC): ICD-10-CM

## 2020-12-20 DIAGNOSIS — E11.42 TYPE 2 DIABETES MELLITUS WITH DIABETIC POLYNEUROPATHY, WITH LONG-TERM CURRENT USE OF INSULIN (HCC): ICD-10-CM

## 2020-12-20 DIAGNOSIS — I25.10 CORONARY ARTERY DISEASE INVOLVING NATIVE HEART WITHOUT ANGINA PECTORIS, UNSPECIFIED VESSEL OR LESION TYPE: ICD-10-CM

## 2020-12-20 DIAGNOSIS — N18.32 STAGE 3B CHRONIC KIDNEY DISEASE (HCC): ICD-10-CM

## 2020-12-20 DIAGNOSIS — Z79.4 CURRENT USE OF INSULIN (HCC): ICD-10-CM

## 2020-12-21 ENCOUNTER — ANESTHESIA (OUTPATIENT)
Dept: PERIOP | Facility: HOSPITAL | Age: 60
End: 2020-12-21
Payer: MEDICARE

## 2020-12-21 ENCOUNTER — HOSPITAL ENCOUNTER (OUTPATIENT)
Facility: HOSPITAL | Age: 60
Setting detail: OUTPATIENT SURGERY
Discharge: HOME | End: 2020-12-21
Attending: SPECIALIST | Admitting: SPECIALIST
Payer: MEDICARE

## 2020-12-21 VITALS
RESPIRATION RATE: 18 BRPM | SYSTOLIC BLOOD PRESSURE: 134 MMHG | OXYGEN SATURATION: 91 % | TEMPERATURE: 97.7 F | DIASTOLIC BLOOD PRESSURE: 65 MMHG | HEART RATE: 74 BPM

## 2020-12-21 VITALS — HEART RATE: 77 BPM

## 2020-12-21 DIAGNOSIS — Z79.4 CURRENT USE OF INSULIN (HCC): ICD-10-CM

## 2020-12-21 DIAGNOSIS — E11.42 TYPE 2 DIABETES MELLITUS WITH DIABETIC POLYNEUROPATHY, WITH LONG-TERM CURRENT USE OF INSULIN (HCC): ICD-10-CM

## 2020-12-21 DIAGNOSIS — I25.10 CORONARY ARTERY DISEASE INVOLVING NATIVE HEART WITHOUT ANGINA PECTORIS, UNSPECIFIED VESSEL OR LESION TYPE: ICD-10-CM

## 2020-12-21 DIAGNOSIS — Z79.4 TYPE 2 DIABETES MELLITUS WITH DIABETIC POLYNEUROPATHY, WITH LONG-TERM CURRENT USE OF INSULIN (HCC): ICD-10-CM

## 2020-12-21 DIAGNOSIS — N18.32 STAGE 3B CHRONIC KIDNEY DISEASE (HCC): ICD-10-CM

## 2020-12-21 DIAGNOSIS — D23.4 DERMOID CYST OF SCALP: ICD-10-CM

## 2020-12-21 PROBLEM — I10 HTN (HYPERTENSION): Status: ACTIVE | Noted: 2020-12-21

## 2020-12-21 LAB
GLUCOSE SERPL-MCNC: 152 MG/DL (ref 65–140)
GLUCOSE SERPL-MCNC: 205 MG/DL (ref 65–140)
GLUCOSE SERPL-MCNC: 224 MG/DL (ref 65–140)

## 2020-12-21 PROCEDURE — 88305 TISSUE EXAM BY PATHOLOGIST: CPT | Performed by: PATHOLOGY

## 2020-12-21 PROCEDURE — 11421 EXC H-F-NK-SP B9+MARG 0.6-1: CPT | Performed by: SPECIALIST

## 2020-12-21 PROCEDURE — 82948 REAGENT STRIP/BLOOD GLUCOSE: CPT

## 2020-12-21 PROCEDURE — 11422 EXC H-F-NK-SP B9+MARG 1.1-2: CPT | Performed by: SPECIALIST

## 2020-12-21 RX ORDER — INSULIN GLARGINE 100 [IU]/ML
45 INJECTION, SOLUTION SUBCUTANEOUS 2 TIMES DAILY
Qty: 10 PEN | Refills: 1 | Status: SHIPPED | OUTPATIENT
Start: 2020-12-21 | End: 2021-01-18

## 2020-12-21 RX ORDER — LIDOCAINE HYDROCHLORIDE 10 MG/ML
INJECTION, SOLUTION EPIDURAL; INFILTRATION; INTRACAUDAL; PERINEURAL AS NEEDED
Status: DISCONTINUED | OUTPATIENT
Start: 2020-12-21 | End: 2020-12-21 | Stop reason: HOSPADM

## 2020-12-21 RX ORDER — FENTANYL CITRATE 50 UG/ML
INJECTION, SOLUTION INTRAMUSCULAR; INTRAVENOUS AS NEEDED
Status: DISCONTINUED | OUTPATIENT
Start: 2020-12-21 | End: 2020-12-21

## 2020-12-21 RX ORDER — FENTANYL CITRATE/PF 50 MCG/ML
50 SYRINGE (ML) INJECTION
Status: DISCONTINUED | OUTPATIENT
Start: 2020-12-21 | End: 2020-12-21 | Stop reason: HOSPADM

## 2020-12-21 RX ORDER — EPHEDRINE SULFATE 50 MG/ML
INJECTION INTRAVENOUS AS NEEDED
Status: DISCONTINUED | OUTPATIENT
Start: 2020-12-21 | End: 2020-12-21

## 2020-12-21 RX ORDER — LIDOCAINE HYDROCHLORIDE AND EPINEPHRINE 10; 10 MG/ML; UG/ML
INJECTION, SOLUTION INFILTRATION; PERINEURAL AS NEEDED
Status: DISCONTINUED | OUTPATIENT
Start: 2020-12-21 | End: 2020-12-21 | Stop reason: HOSPADM

## 2020-12-21 RX ORDER — DEXAMETHASONE SODIUM PHOSPHATE 4 MG/ML
INJECTION, SOLUTION INTRA-ARTICULAR; INTRALESIONAL; INTRAMUSCULAR; INTRAVENOUS; SOFT TISSUE AS NEEDED
Status: DISCONTINUED | OUTPATIENT
Start: 2020-12-21 | End: 2020-12-21

## 2020-12-21 RX ORDER — MAGNESIUM HYDROXIDE 1200 MG/15ML
LIQUID ORAL AS NEEDED
Status: DISCONTINUED | OUTPATIENT
Start: 2020-12-21 | End: 2020-12-21 | Stop reason: HOSPADM

## 2020-12-21 RX ORDER — CARVEDILOL 6.25 MG/1
6.25 TABLET ORAL 2 TIMES DAILY WITH MEALS
Qty: 60 TABLET | Refills: 0 | Status: SHIPPED | OUTPATIENT
Start: 2020-12-21 | End: 2021-01-18 | Stop reason: SDUPTHER

## 2020-12-21 RX ORDER — ATORVASTATIN CALCIUM 40 MG/1
TABLET, FILM COATED ORAL
Qty: 30 TABLET | Refills: 0 | Status: SHIPPED | OUTPATIENT
Start: 2020-12-21 | End: 2021-01-13

## 2020-12-21 RX ORDER — LIDOCAINE HYDROCHLORIDE 10 MG/ML
INJECTION, SOLUTION EPIDURAL; INFILTRATION; INTRACAUDAL; PERINEURAL AS NEEDED
Status: DISCONTINUED | OUTPATIENT
Start: 2020-12-21 | End: 2020-12-21

## 2020-12-21 RX ORDER — MIDAZOLAM HYDROCHLORIDE 2 MG/2ML
INJECTION, SOLUTION INTRAMUSCULAR; INTRAVENOUS AS NEEDED
Status: DISCONTINUED | OUTPATIENT
Start: 2020-12-21 | End: 2020-12-21

## 2020-12-21 RX ORDER — SODIUM CHLORIDE, SODIUM LACTATE, POTASSIUM CHLORIDE, CALCIUM CHLORIDE 600; 310; 30; 20 MG/100ML; MG/100ML; MG/100ML; MG/100ML
INJECTION, SOLUTION INTRAVENOUS CONTINUOUS PRN
Status: DISCONTINUED | OUTPATIENT
Start: 2020-12-21 | End: 2020-12-21

## 2020-12-21 RX ORDER — IBUPROFEN 400 MG/1
400 TABLET ORAL EVERY 6 HOURS PRN
Status: DISCONTINUED | OUTPATIENT
Start: 2020-12-21 | End: 2020-12-21 | Stop reason: HOSPADM

## 2020-12-21 RX ORDER — ONDANSETRON 2 MG/ML
4 INJECTION INTRAMUSCULAR; INTRAVENOUS EVERY 8 HOURS PRN
Status: DISCONTINUED | OUTPATIENT
Start: 2020-12-21 | End: 2020-12-21 | Stop reason: HOSPADM

## 2020-12-21 RX ORDER — PROPOFOL 10 MG/ML
INJECTION, EMULSION INTRAVENOUS CONTINUOUS PRN
Status: DISCONTINUED | OUTPATIENT
Start: 2020-12-21 | End: 2020-12-21

## 2020-12-21 RX ORDER — CEFAZOLIN SODIUM 2 G/50ML
2000 SOLUTION INTRAVENOUS ONCE
Status: COMPLETED | OUTPATIENT
Start: 2020-12-21 | End: 2020-12-21

## 2020-12-21 RX ORDER — METOCLOPRAMIDE HYDROCHLORIDE 5 MG/ML
10 INJECTION INTRAMUSCULAR; INTRAVENOUS ONCE AS NEEDED
Status: DISCONTINUED | OUTPATIENT
Start: 2020-12-21 | End: 2020-12-21 | Stop reason: HOSPADM

## 2020-12-21 RX ORDER — NITROGLYCERIN 0.4 MG/1
0.4 TABLET SUBLINGUAL
Qty: 25 TABLET | Refills: 1 | Status: SHIPPED | OUTPATIENT
Start: 2020-12-21 | End: 2021-04-26 | Stop reason: SDUPTHER

## 2020-12-21 RX ORDER — LABETALOL 20 MG/4 ML (5 MG/ML) INTRAVENOUS SYRINGE
10
Status: DISCONTINUED | OUTPATIENT
Start: 2020-12-21 | End: 2020-12-21 | Stop reason: HOSPADM

## 2020-12-21 RX ORDER — ONDANSETRON 2 MG/ML
4 INJECTION INTRAMUSCULAR; INTRAVENOUS ONCE AS NEEDED
Status: DISCONTINUED | OUTPATIENT
Start: 2020-12-21 | End: 2020-12-21 | Stop reason: HOSPADM

## 2020-12-21 RX ORDER — ONDANSETRON 2 MG/ML
INJECTION INTRAMUSCULAR; INTRAVENOUS AS NEEDED
Status: DISCONTINUED | OUTPATIENT
Start: 2020-12-21 | End: 2020-12-21

## 2020-12-21 RX ADMIN — ONDANSETRON HYDROCHLORIDE 4 MG: 2 INJECTION, SOLUTION INTRAMUSCULAR; INTRAVENOUS at 13:52

## 2020-12-21 RX ADMIN — INSULIN LISPRO 7 UNITS: 100 INJECTION, SOLUTION INTRAVENOUS; SUBCUTANEOUS at 11:50

## 2020-12-21 RX ADMIN — FENTANYL CITRATE 50 MCG: 50 INJECTION, SOLUTION INTRAMUSCULAR; INTRAVENOUS at 14:54

## 2020-12-21 RX ADMIN — SODIUM CHLORIDE, SODIUM LACTATE, POTASSIUM CHLORIDE, AND CALCIUM CHLORIDE: .6; .31; .03; .02 INJECTION, SOLUTION INTRAVENOUS at 13:22

## 2020-12-21 RX ADMIN — EPHEDRINE SULFATE 10 MG: 50 INJECTION, SOLUTION INTRAVENOUS at 14:14

## 2020-12-21 RX ADMIN — FENTANYL CITRATE 50 MCG: 50 INJECTION, SOLUTION INTRAMUSCULAR; INTRAVENOUS at 15:07

## 2020-12-21 RX ADMIN — LIDOCAINE HYDROCHLORIDE 25 MG: 10 INJECTION, SOLUTION EPIDURAL; INFILTRATION; INTRACAUDAL; PERINEURAL at 13:27

## 2020-12-21 RX ADMIN — MIDAZOLAM HYDROCHLORIDE 1 MG: 1 INJECTION, SOLUTION INTRAMUSCULAR; INTRAVENOUS at 13:28

## 2020-12-21 RX ADMIN — DEXAMETHASONE SODIUM PHOSPHATE 4 MG: 4 INJECTION, SOLUTION INTRA-ARTICULAR; INTRALESIONAL; INTRAMUSCULAR; INTRAVENOUS; SOFT TISSUE at 13:32

## 2020-12-21 RX ADMIN — CEFAZOLIN SODIUM 3000 MG: 2 SOLUTION INTRAVENOUS at 13:26

## 2020-12-21 RX ADMIN — PROPOFOL 50 MCG/KG/MIN: 10 INJECTION, EMULSION INTRAVENOUS at 13:30

## 2020-12-21 RX ADMIN — FENTANYL CITRATE 100 MCG: 50 INJECTION INTRAMUSCULAR; INTRAVENOUS at 13:25

## 2020-12-21 RX ADMIN — MIDAZOLAM HYDROCHLORIDE 1 MG: 1 INJECTION, SOLUTION INTRAMUSCULAR; INTRAVENOUS at 13:25

## 2020-12-22 ENCOUNTER — PATIENT OUTREACH (OUTPATIENT)
Dept: FAMILY MEDICINE CLINIC | Facility: CLINIC | Age: 60
End: 2020-12-22

## 2020-12-22 DIAGNOSIS — L97.509 TYPE 2 DIABETES MELLITUS WITH FOOT ULCER, WITH LONG-TERM CURRENT USE OF INSULIN (HCC): Primary | ICD-10-CM

## 2020-12-22 DIAGNOSIS — E11.621 TYPE 2 DIABETES MELLITUS WITH FOOT ULCER, WITH LONG-TERM CURRENT USE OF INSULIN (HCC): Primary | ICD-10-CM

## 2020-12-22 DIAGNOSIS — Z79.4 TYPE 2 DIABETES MELLITUS WITH FOOT ULCER, WITH LONG-TERM CURRENT USE OF INSULIN (HCC): Primary | ICD-10-CM

## 2020-12-24 DIAGNOSIS — Z79.4 TYPE 2 DIABETES MELLITUS WITH FOOT ULCER, WITH LONG-TERM CURRENT USE OF INSULIN (HCC): ICD-10-CM

## 2020-12-24 DIAGNOSIS — L97.509 TYPE 2 DIABETES MELLITUS WITH FOOT ULCER, WITH LONG-TERM CURRENT USE OF INSULIN (HCC): ICD-10-CM

## 2020-12-24 DIAGNOSIS — E11.621 TYPE 2 DIABETES MELLITUS WITH FOOT ULCER, WITH LONG-TERM CURRENT USE OF INSULIN (HCC): ICD-10-CM

## 2020-12-28 DIAGNOSIS — L97.509 TYPE 2 DIABETES MELLITUS WITH FOOT ULCER, WITH LONG-TERM CURRENT USE OF INSULIN (HCC): Primary | ICD-10-CM

## 2020-12-28 DIAGNOSIS — E11.621 TYPE 2 DIABETES MELLITUS WITH FOOT ULCER, WITH LONG-TERM CURRENT USE OF INSULIN (HCC): ICD-10-CM

## 2020-12-28 DIAGNOSIS — L97.509 TYPE 2 DIABETES MELLITUS WITH FOOT ULCER, WITH LONG-TERM CURRENT USE OF INSULIN (HCC): ICD-10-CM

## 2020-12-28 DIAGNOSIS — Z79.4 TYPE 2 DIABETES MELLITUS WITH FOOT ULCER, WITH LONG-TERM CURRENT USE OF INSULIN (HCC): ICD-10-CM

## 2020-12-28 DIAGNOSIS — Z79.4 TYPE 2 DIABETES MELLITUS WITH FOOT ULCER, WITH LONG-TERM CURRENT USE OF INSULIN (HCC): Primary | ICD-10-CM

## 2020-12-28 DIAGNOSIS — E11.621 TYPE 2 DIABETES MELLITUS WITH FOOT ULCER, WITH LONG-TERM CURRENT USE OF INSULIN (HCC): Primary | ICD-10-CM

## 2020-12-28 RX ORDER — INSULIN ASPART 100 [IU]/ML
13 INJECTION, SOLUTION INTRAVENOUS; SUBCUTANEOUS
Qty: 5 PEN | Refills: 2 | Status: SHIPPED | OUTPATIENT
Start: 2020-12-28 | End: 2021-01-18

## 2020-12-29 ENCOUNTER — OFFICE VISIT (OUTPATIENT)
Dept: SURGERY | Facility: CLINIC | Age: 60
End: 2020-12-29
Payer: MEDICARE

## 2020-12-29 ENCOUNTER — PATIENT OUTREACH (OUTPATIENT)
Dept: FAMILY MEDICINE CLINIC | Facility: CLINIC | Age: 60
End: 2020-12-29

## 2020-12-29 VITALS — TEMPERATURE: 97.2 F

## 2020-12-29 DIAGNOSIS — D23.4 DERMOID CYST OF SCALP: Primary | ICD-10-CM

## 2020-12-29 DIAGNOSIS — K42.0 UMBILICAL HERNIA WITH OBSTRUCTION, WITHOUT GANGRENE: ICD-10-CM

## 2020-12-29 DIAGNOSIS — K42.0 UMBILICAL HERNIA WITH OBSTRUCTION, WITHOUT GANGRENE: Primary | ICD-10-CM

## 2020-12-29 PROCEDURE — 99024 POSTOP FOLLOW-UP VISIT: CPT | Performed by: SPECIALIST

## 2020-12-29 PROCEDURE — 99214 OFFICE O/P EST MOD 30 MIN: CPT | Performed by: SPECIALIST

## 2020-12-30 ENCOUNTER — PATIENT OUTREACH (OUTPATIENT)
Dept: FAMILY MEDICINE CLINIC | Facility: CLINIC | Age: 60
End: 2020-12-30

## 2020-12-30 ENCOUNTER — OFFICE VISIT (OUTPATIENT)
Dept: OBGYN CLINIC | Facility: OTHER | Age: 60
End: 2020-12-30
Payer: MEDICARE

## 2020-12-30 VITALS
SYSTOLIC BLOOD PRESSURE: 158 MMHG | DIASTOLIC BLOOD PRESSURE: 80 MMHG | WEIGHT: 280 LBS | BODY MASS INDEX: 42.57 KG/M2 | HEART RATE: 80 BPM

## 2020-12-30 DIAGNOSIS — M17.12 PRIMARY OSTEOARTHRITIS OF LEFT KNEE: Primary | ICD-10-CM

## 2020-12-30 PROCEDURE — 99213 OFFICE O/P EST LOW 20 MIN: CPT | Performed by: ORTHOPAEDIC SURGERY

## 2021-01-05 ENCOUNTER — PATIENT OUTREACH (OUTPATIENT)
Dept: FAMILY MEDICINE CLINIC | Facility: CLINIC | Age: 61
End: 2021-01-05

## 2021-01-05 DIAGNOSIS — I25.10 CORONARY ARTERY DISEASE INVOLVING NATIVE HEART WITHOUT ANGINA PECTORIS, UNSPECIFIED VESSEL OR LESION TYPE: ICD-10-CM

## 2021-01-05 DIAGNOSIS — Z79.4 TYPE 2 DIABETES MELLITUS WITH DIABETIC POLYNEUROPATHY, WITH LONG-TERM CURRENT USE OF INSULIN (HCC): ICD-10-CM

## 2021-01-05 DIAGNOSIS — N18.32 STAGE 3B CHRONIC KIDNEY DISEASE (HCC): ICD-10-CM

## 2021-01-05 DIAGNOSIS — E11.42 TYPE 2 DIABETES MELLITUS WITH DIABETIC POLYNEUROPATHY, WITH LONG-TERM CURRENT USE OF INSULIN (HCC): ICD-10-CM

## 2021-01-05 DIAGNOSIS — Z79.4 CURRENT USE OF INSULIN (HCC): ICD-10-CM

## 2021-01-05 RX ORDER — LISINOPRIL 20 MG/1
20 TABLET ORAL DAILY
Qty: 30 TABLET | Refills: 6 | Status: SHIPPED | OUTPATIENT
Start: 2021-01-05 | End: 2021-01-21 | Stop reason: SDUPTHER

## 2021-01-05 RX ORDER — ASPIRIN 81 MG/1
TABLET, COATED ORAL
Qty: 30 TABLET | Refills: 0 | Status: SHIPPED | OUTPATIENT
Start: 2021-01-05 | End: 2021-02-04

## 2021-01-05 RX ORDER — CITALOPRAM 40 MG/1
TABLET ORAL
Qty: 30 TABLET | Refills: 0 | Status: SHIPPED | OUTPATIENT
Start: 2021-01-05 | End: 2021-01-18 | Stop reason: SDUPTHER

## 2021-01-05 RX ORDER — LISINOPRIL 10 MG/1
TABLET ORAL
Qty: 30 TABLET | Refills: 0 | OUTPATIENT
Start: 2021-01-05

## 2021-01-05 RX ORDER — CLOPIDOGREL BISULFATE 75 MG/1
TABLET ORAL
Qty: 30 TABLET | Refills: 0 | Status: SHIPPED | OUTPATIENT
Start: 2021-01-05 | End: 2021-02-04

## 2021-01-05 RX ORDER — HYDROCHLOROTHIAZIDE 12.5 MG/1
CAPSULE, GELATIN COATED ORAL
Qty: 30 CAPSULE | Refills: 0 | Status: SHIPPED | OUTPATIENT
Start: 2021-01-05 | End: 2021-01-18 | Stop reason: SDUPTHER

## 2021-01-05 NOTE — PROGRESS NOTES
Outgoing Call:  1/5/21    CHW did call pt and reminded her of upcoming appointment with her PCP on 1/7/21  Pt will meet with CHW after her appointment, at [facility] will provide pt with new [employer] consent forms for signatures  Pt denied receiving her copies which CHW had mailed to her in December  CHW did discuss housing details with pt  CHW asked pt to describe what she meant by "assisted living" facility  Previously pt had stated that she wanted to live in an assisted living facility  CHW explained to pt that an assisted living facility would be a nursing home  Pt stated that she was actually looking for financial assisted type housing  CHW explained that would be a subsidized housing program  Pt agreed  CHW did explain to pt that all senior high rises in the area require an applicant to be at least 58years old, and CHW explained that [employer] is closed to On license of [facility]  CHW also informed pt that she is not able to apply at any of the neighboring [facility] or [facility]'s subsidized  programs since she is a resident of [facility]  CHW informed pt that she would have to seek regular housing at a standard rate and that CHW could assist in seeking apartments for rent  Pt stated that she would give it some thought as she does not think that she could afford living on her own and pay a full rent  CHW informed pt that she would call [employer] and inquire about any other subsidized housing programs  Next outreach is scheduled for 1/7/21

## 2021-01-07 ENCOUNTER — PATIENT OUTREACH (OUTPATIENT)
Dept: FAMILY MEDICINE CLINIC | Facility: CLINIC | Age: 61
End: 2021-01-07

## 2021-01-07 NOTE — PROGRESS NOTES
Outgoing Call:  1/7/21    CHW did call pt at 12:35pm to ask if she had arrived early for her 1pm appointment with her PCP  Pt stated that she did not know she had an appointment today  CHW did remind pt that she had called her on 1/5/21, to remind her of her PCP appointment, and pt had agreed to meet with CHW and complete a [employer]anta application  Pt apologized and stated that she forgot about this appointment  CHW asked pt to please call [facility] to reschedule her appointment  Pt agreed and stated that she would meet with CHW at next PCP appointment  Next outreach is scheduled for 1/14/21

## 2021-01-13 ENCOUNTER — PATIENT OUTREACH (OUTPATIENT)
Dept: FAMILY MEDICINE CLINIC | Facility: CLINIC | Age: 61
End: 2021-01-13

## 2021-01-13 ENCOUNTER — TELEPHONE (OUTPATIENT)
Dept: CARDIOLOGY CLINIC | Facility: CLINIC | Age: 61
End: 2021-01-13

## 2021-01-13 DIAGNOSIS — I25.10 CORONARY ARTERY DISEASE INVOLVING NATIVE HEART WITHOUT ANGINA PECTORIS, UNSPECIFIED VESSEL OR LESION TYPE: ICD-10-CM

## 2021-01-13 DIAGNOSIS — N18.32 STAGE 3B CHRONIC KIDNEY DISEASE (HCC): ICD-10-CM

## 2021-01-13 DIAGNOSIS — Z79.4 CURRENT USE OF INSULIN (HCC): ICD-10-CM

## 2021-01-13 DIAGNOSIS — Z79.4 TYPE 2 DIABETES MELLITUS WITH DIABETIC POLYNEUROPATHY, WITH LONG-TERM CURRENT USE OF INSULIN (HCC): ICD-10-CM

## 2021-01-13 DIAGNOSIS — E11.42 TYPE 2 DIABETES MELLITUS WITH DIABETIC POLYNEUROPATHY, WITH LONG-TERM CURRENT USE OF INSULIN (HCC): ICD-10-CM

## 2021-01-13 RX ORDER — ATORVASTATIN CALCIUM 40 MG/1
TABLET, FILM COATED ORAL
Qty: 30 TABLET | Refills: 0 | Status: SHIPPED | OUTPATIENT
Start: 2021-01-13 | End: 2021-01-18 | Stop reason: SDUPTHER

## 2021-01-13 NOTE — PROGRESS NOTES
I called the patient to follow up before her upcoming surgery on Friday, 1/15  She states she is well although she was coughed a few times during our conversation  She states she gets a "tickle" in her throat every year at this time which causes her to cough  She also reported post nasal drip but denies any fever  Patient notes her blood sugars remain elevated with fasting readings 270-300 and afternoon readings ~200  She states she is trying to make changes but it is difficult  I will follow up with the patient after her surgery  She was grateful for this call

## 2021-01-13 NOTE — TELEPHONE ENCOUNTER
Fax received from [facility] preadmission testing requesting medication hold for Plavix and asa for upcoming hernia surgery 1/15/21    Phone call to preadmission testing ([phone]) we need this request to come from the performing surgeon  They will notify surgeon

## 2021-01-14 ENCOUNTER — PATIENT OUTREACH (OUTPATIENT)
Dept: FAMILY MEDICINE CLINIC | Facility: CLINIC | Age: 61
End: 2021-01-14

## 2021-01-14 ENCOUNTER — TELEPHONE (OUTPATIENT)
Dept: SURGERY | Facility: CLINIC | Age: 61
End: 2021-01-14

## 2021-01-14 NOTE — PROGRESS NOTES
Outgoing Call:  1/14/21    CHW did call pt to inquire about status of Chrissy Buck consent forms sent to her  Pt stated that she still had not received them  Pt looked over her mail while on the phone with CHW  CHW offered to mail out another set of consents for her to sign  CHW reviewed pt's address with her and noticed that the incorrect apt number was listed in her chart  Pt stated that this could be why she did not receive the letter  CHW did update pt's address in her chart  New consents mailed today  CHW reminded pt of her upcoming PCP appointment on 1/18/21  Next outreach is scheduled for 1/21/21

## 2021-01-14 NOTE — TELEPHONE ENCOUNTER
Phone call to patient  Per Dr Anabella Fagan, A1C is 12 3 and is too high for surgery  Called pt to let her know surgery will be cancelled for tomorrow and she must contact PCP today to get her blood sugar under control prior to rescheduling surgery

## 2021-01-18 ENCOUNTER — TELEMEDICINE (OUTPATIENT)
Dept: FAMILY MEDICINE CLINIC | Facility: CLINIC | Age: 61
End: 2021-01-18

## 2021-01-18 DIAGNOSIS — Z79.4 CURRENT USE OF INSULIN (HCC): ICD-10-CM

## 2021-01-18 DIAGNOSIS — Z98.1 S/P CERVICAL SPINAL FUSION: Primary | ICD-10-CM

## 2021-01-18 DIAGNOSIS — L97.509 TYPE 2 DIABETES MELLITUS WITH FOOT ULCER, WITH LONG-TERM CURRENT USE OF INSULIN (HCC): ICD-10-CM

## 2021-01-18 DIAGNOSIS — E11.42 TYPE 2 DIABETES MELLITUS WITH DIABETIC POLYNEUROPATHY, WITH LONG-TERM CURRENT USE OF INSULIN (HCC): ICD-10-CM

## 2021-01-18 DIAGNOSIS — M17.12 PRIMARY OSTEOARTHRITIS OF LEFT KNEE: ICD-10-CM

## 2021-01-18 DIAGNOSIS — I25.10 CORONARY ARTERY DISEASE INVOLVING NATIVE HEART WITHOUT ANGINA PECTORIS, UNSPECIFIED VESSEL OR LESION TYPE: ICD-10-CM

## 2021-01-18 DIAGNOSIS — F33.2 SEVERE EPISODE OF RECURRENT MAJOR DEPRESSIVE DISORDER, WITHOUT PSYCHOTIC FEATURES (HCC): ICD-10-CM

## 2021-01-18 DIAGNOSIS — E11.621 TYPE 2 DIABETES MELLITUS WITH FOOT ULCER, WITH LONG-TERM CURRENT USE OF INSULIN (HCC): ICD-10-CM

## 2021-01-18 DIAGNOSIS — Z79.4 TYPE 2 DIABETES MELLITUS WITH FOOT ULCER, WITH LONG-TERM CURRENT USE OF INSULIN (HCC): ICD-10-CM

## 2021-01-18 DIAGNOSIS — Z79.4 TYPE 2 DIABETES MELLITUS WITH DIABETIC POLYNEUROPATHY, WITH LONG-TERM CURRENT USE OF INSULIN (HCC): ICD-10-CM

## 2021-01-18 DIAGNOSIS — Q78.2 SEVERE OSTEOPETROSIS: ICD-10-CM

## 2021-01-18 DIAGNOSIS — N18.32 STAGE 3B CHRONIC KIDNEY DISEASE (HCC): ICD-10-CM

## 2021-01-18 PROCEDURE — G2025 DIS SITE TELE SVCS RHC/FQHC: HCPCS | Performed by: NURSE PRACTITIONER

## 2021-01-18 RX ORDER — GABAPENTIN 600 MG/1
600 TABLET ORAL 3 TIMES DAILY
Qty: 90 TABLET | Refills: 2 | Status: SHIPPED | OUTPATIENT
Start: 2021-01-18 | End: 2021-03-10 | Stop reason: SDUPTHER

## 2021-01-18 RX ORDER — ATORVASTATIN CALCIUM 40 MG/1
40 TABLET, FILM COATED ORAL DAILY
Qty: 30 TABLET | Refills: 2 | Status: SHIPPED | OUTPATIENT
Start: 2021-01-18 | End: 2021-03-10 | Stop reason: SDUPTHER

## 2021-01-18 RX ORDER — HYDROCODONE BITARTRATE AND ACETAMINOPHEN 5; 325 MG/1; MG/1
1 TABLET ORAL
Qty: 30 TABLET | Refills: 0 | Status: SHIPPED | OUTPATIENT
Start: 2021-01-18 | End: 2021-01-18

## 2021-01-18 RX ORDER — HYDROCODONE BITARTRATE AND ACETAMINOPHEN 5; 325 MG/1; MG/1
2 TABLET ORAL 2 TIMES DAILY PRN
Qty: 120 TABLET | Refills: 0 | Status: SHIPPED | OUTPATIENT
Start: 2021-01-18 | End: 2021-03-10 | Stop reason: SDUPTHER

## 2021-01-18 RX ORDER — INSULIN GLARGINE 100 [IU]/ML
50 INJECTION, SOLUTION SUBCUTANEOUS 2 TIMES DAILY
Qty: 10 PEN | Refills: 1 | Status: SHIPPED | OUTPATIENT
Start: 2021-01-18 | End: 2021-03-10 | Stop reason: SDUPTHER

## 2021-01-18 RX ORDER — CARVEDILOL 6.25 MG/1
6.25 TABLET ORAL 2 TIMES DAILY WITH MEALS
Qty: 60 TABLET | Refills: 2 | Status: SHIPPED | OUTPATIENT
Start: 2021-01-18 | End: 2021-03-10 | Stop reason: SDUPTHER

## 2021-01-18 RX ORDER — INSULIN ASPART 100 [IU]/ML
15 INJECTION, SOLUTION INTRAVENOUS; SUBCUTANEOUS
Qty: 5 PEN | Refills: 2 | Status: SHIPPED | OUTPATIENT
Start: 2021-01-18 | End: 2021-02-16

## 2021-01-18 RX ORDER — CITALOPRAM 40 MG/1
40 TABLET ORAL DAILY
Qty: 30 TABLET | Refills: 2 | Status: SHIPPED | OUTPATIENT
Start: 2021-01-18 | End: 2021-01-21

## 2021-01-18 RX ORDER — HYDROCHLOROTHIAZIDE 12.5 MG/1
12.5 CAPSULE, GELATIN COATED ORAL DAILY
Qty: 30 CAPSULE | Refills: 2 | Status: SHIPPED | OUTPATIENT
Start: 2021-01-18 | End: 2021-05-10

## 2021-01-18 NOTE — PROGRESS NOTES
Virtual Brief Visit    Assessment/Plan:    Problem List Items Addressed This Visit        Endocrine    Type 2 diabetes mellitus, with long-term current use of insulin (HCC)    Relevant Medications    Dulaglutide 1 5 MG/0 5ML SOPN    insulin glargine (Basaglar KwikPen) 100 units/mL injection pen    insulin aspart (NovoLOG FlexPen) 100 UNIT/ML injection pen    atorvastatin (LIPITOR) 40 mg tablet    carvedilol (COREG) 6 25 mg tablet    citalopram (CeleXA) 40 mg tablet    hydrochlorothiazide (MICROZIDE) 12 5 mg capsule       Cardiovascular and Mediastinum    CAD (coronary artery disease)    Relevant Medications    insulin glargine (Basaglar KwikPen) 100 units/mL injection pen    atorvastatin (LIPITOR) 40 mg tablet    carvedilol (COREG) 6 25 mg tablet    citalopram (CeleXA) 40 mg tablet    hydrochlorothiazide (MICROZIDE) 12 5 mg capsule       Musculoskeletal and Integument    Osteoarthritis of left knee    Relevant Medications    HYDROcodone-acetaminophen (NORCO) 5-325 mg per tablet       Genitourinary    CKD (chronic kidney disease) stage 3, GFR 30-59 ml/min    Relevant Medications    insulin glargine (Basaglar KwikPen) 100 units/mL injection pen    atorvastatin (LIPITOR) 40 mg tablet    carvedilol (COREG) 6 25 mg tablet    citalopram (CeleXA) 40 mg tablet    hydrochlorothiazide (MICROZIDE) 12 5 mg capsule       Other    Major depressive disorder    Relevant Medications    citalopram (CeleXA) 40 mg tablet    Other Relevant Orders    Ambulatory referral to social work care management program    S/P cervical spinal fusion - Primary    Relevant Medications    gabapentin (NEURONTIN) 600 MG tablet    HYDROcodone-acetaminophen (NORCO) 5-325 mg per tablet      Other Visit Diagnoses     Current use of insulin (HCC)        Relevant Medications    insulin glargine (Basaglar KwikPen) 100 units/mL injection pen    atorvastatin (LIPITOR) 40 mg tablet    carvedilol (COREG) 6 25 mg tablet    citalopram (CeleXA) 40 mg tablet hydrochlorothiazide (MICROZIDE) 12 5 mg capsule    Severe osteopetrosis            Adjustments made to insulins (increased)  Blood sugars are improving  Discussed diet changes  Patient admits that she often is not eating the best due to financial difficulties- referral placed to   Recommend FU in 1-2 weeks to check FBG  Reason for visit is   Chief Complaint   Patient presents with    Virtual Brief Visit        Encounter provider Purvi Macdonald    Provider located at [address] [place] [address]  [address] [address] [phone]    Recent Visits  No visits were found meeting these conditions  Showing recent visits within past 7 days and meeting all other requirements     Today's Visits  Date Type Provider Dept   01/18/21 Telemedicine Isra Macdonald 50 today's visits and meeting all other requirements     Future Appointments  No visits were found meeting these conditions  Showing future appointments within next 150 days and meeting all other requirements        After connecting through telephone, the patient was identified by name and date of birth  Danny Clark was informed that this is a telemedicine visit and that the visit is being conducted through telephone and patient was informed that this is not a secure, HIPAA-compliant platform  She agrees to proceed     My office door was closed  No one else was in the room  She acknowledged consent and understanding of privacy and security of the platform  The patient has agreed to participate and understands she can discontinue the visit at any time  It was my intention to perform this visit via video technology but the patient was not able to do a video connection so the visit was completed via telephone audio only  Patient is aware this is a billable service  Subjective    Danny Clark is a 64 y o  female who presents today for virtual follow up   Patient is a known poorly controlled diabetic with most recent A1c in December at 12 3  Today the patient reports that she is worried because her FBG has typically been around 200's - 300  She is taking all of her medications as prescribed  She does report that she eats a poor diet due to financial difficulties       Past Medical History:   Diagnosis Date    Abnormal liver function     Anemia     Anxiety     Arthritis     Chronic kidney disease     stage 3    Chronic narcotic dependence (HCC)     Chronic pain disorder     lower back, hands , neck and knees    Coronary artery disease     Depression     Diabetes mellitus ([address] )     GERD (gastroesophageal reflux disease)     no meds at present    Heart murmur     murmur    Hyperlipidemia     Hypertension     Open toe wound 12/2020    right big toe open calus but is dry at present    Renal disorder     Shortness of breath     exertion    Sleep apnea     doesn't use cpap       Past Surgical History:   Procedure Laterality Date    AMPUTATION      ANGIOPLASTY  2017 5    BREAST EXCISIONAL BIOPSY Left     BREAST SURGERY      CARDIAC CATHETERIZATION      CARPAL TUNNEL RELEASE Bilateral     CERVICAL FUSION      HYSTERECTOMY  2008    KNEE SURGERY      OOPHORECTOMY  2008    RI EXC SKIN BENIG 3 1-4 CM REMAINDR BODY N/A 12/21/2020    Procedure: EXCISION SEBACEOUS CYST X 5 SCALP;  Surgeon: Isaac Gupta MD;  Location: [address] [place] [address];  Service: General    TOE AMPUTATION Left     TRIGGER FINGER RELEASE Right     4th Finger       Current Outpatient Medications   Medication Sig Dispense Refill    allopurinol (ZYLOPRIM) 300 mg tablet Take 300 mg by mouth daily      Aspirin Low Dose 81 MG EC tablet TAKE 1 TABLET (81 MG TOTAL) BY MOUTH ONCE FOR 1 DOSE 30 tablet 0    atorvastatin (LIPITOR) 40 mg tablet Take 1 tablet (40 mg total) by mouth daily 30 tablet 2    BD Veo Insulin Syringe U/F 31G X 15/64" 0 5 ML MISC USE TO INJECT INSULIN DAILY 100 each 2    carvedilol (COREG) 6 25 mg tablet Take 1 tablet (6 25 mg total) by mouth 2 (two) times a day with meals 60 tablet 2    citalopram (CeleXA) 40 mg tablet Take 1 tablet (40 mg total) by mouth daily 30 tablet 2    clopidogrel (PLAVIX) 75 mg tablet TAKE 1 TABLET BY MOUTH EVERY DAY 30 tablet 0    Diclofenac Sodium (VOLTAREN) 1 % Apply 2 g topically 4 (four) times a day 100 g 1    diphenhydrAMINE (BENADRYL) 25 mg capsule Take 25 mg by mouth every 4 (four) hours as needed for allergies or sleep       Dulaglutide 1 5 MG/0 5ML SOPN Inject 0 5 mL (1 5 mg total) under the skin once a week 4 pen 3    gabapentin (NEURONTIN) 600 MG tablet Take 1 tablet (600 mg total) by mouth 3 (three) times a day 90 tablet 2    glucose blood (OneTouch Ultra) test strip Use 1 each daily Use as instructed 100 each 2    hydrochlorothiazide (MICROZIDE) 12 5 mg capsule Take 1 capsule (12 5 mg total) by mouth daily 30 capsule 2    HYDROcodone-acetaminophen (NORCO) 5-325 mg per tablet Take 2 tablets by mouth 2 (two) times a day as needed for painMax Daily Amount: 4 tablets 120 tablet 0    insulin aspart (NovoLOG FlexPen) 100 UNIT/ML injection pen Inject 15 Units under the skin 3 (three) times a day with meals 5 pen 2    insulin glargine (Basaglar KwikPen) 100 units/mL injection pen Inject 50 Units under the skin 2 (two) times a day 10 pen 1    lactulose (CHRONULAC) 10 g/15 mL solution Take 20 g by mouth daily at bedtime For elev Ammonia level      lisinopril (ZESTRIL) 20 mg tablet Take 1 tablet (20 mg total) by mouth daily 30 tablet 6    naloxone (NARCAN) 4 mg/0 1 mL nasal spray Administer 1 spray into a nostril  If breathing does not return to normal or if breathing difficulty resumes after 2-3 minutes, give another dose in the other nostril using a new spray   1 each 1    nitroglycerin (NITROSTAT) 0 4 mg SL tablet Place 1 tablet (0 4 mg total) under the tongue every 5 (five) minutes as needed for chest pain 25 tablet 1    OLANZapine (ZyPREXA) 5 mg tablet Take 5 mg by mouth daily at bedtime      OneTouch Delica Lancets 47Z MISC Use to test 3 times daily 100 each 2    POT BICARB-POT CHLORIDE,25MEQ, 25 MEQ TBEF Take by mouth      povidone-iodine 10 % Apply topically once as needed for wound care for up to 1 dose 100 each 0     No current facility-administered medications for this visit  Facility-Administered Medications Ordered in Other Visits   Medication Dose Route Frequency Provider Last Rate Last Admin    doxycycline hyclate (VIBRAMYCIN) capsule 100 mg  100 mg Oral Once Vilma Robertson MD            Allergies   Allergen Reactions    Codeine      Other reaction(s): Nausea and Vomiting       Review of Systems   Constitutional: Negative for chills and fever  HENT: Negative for ear pain and sore throat  Eyes: Negative for pain and visual disturbance  Respiratory: Negative for cough and shortness of breath  Cardiovascular: Negative for chest pain and palpitations  Gastrointestinal: Negative for abdominal pain and vomiting  Genitourinary: Negative for dysuria and hematuria  Musculoskeletal: Positive for arthralgias, back pain, gait problem and myalgias  Skin: Negative for color change and rash  Neurological: Negative for seizures and syncope  All other systems reviewed and are negative  There were no vitals filed for this visit  I spent 15 minutes directly with the patient during this visit    VIRTUAL VISIT DISCLAIMER    Messi Stack acknowledges that she has consented to an online visit or consultation  She understands that the online visit is based solely on information provided by her, and that, in the absence of a face-to-face physical evaluation by the physician, the diagnosis she receives is both limited and provisional in terms of accuracy and completeness  This is not intended to replace a full medical face-to-face evaluation by the physician  Messi Stack understands and accepts these terms

## 2021-01-20 DIAGNOSIS — L97.509 TYPE 2 DIABETES MELLITUS WITH FOOT ULCER, WITH LONG-TERM CURRENT USE OF INSULIN (HCC): ICD-10-CM

## 2021-01-20 DIAGNOSIS — Z79.4 CURRENT USE OF INSULIN (HCC): ICD-10-CM

## 2021-01-20 DIAGNOSIS — I25.10 CORONARY ARTERY DISEASE INVOLVING NATIVE HEART WITHOUT ANGINA PECTORIS, UNSPECIFIED VESSEL OR LESION TYPE: ICD-10-CM

## 2021-01-20 DIAGNOSIS — Z79.4 TYPE 2 DIABETES MELLITUS WITH DIABETIC POLYNEUROPATHY, WITH LONG-TERM CURRENT USE OF INSULIN (HCC): ICD-10-CM

## 2021-01-20 DIAGNOSIS — E11.621 TYPE 2 DIABETES MELLITUS WITH FOOT ULCER, WITH LONG-TERM CURRENT USE OF INSULIN (HCC): ICD-10-CM

## 2021-01-20 DIAGNOSIS — E11.42 TYPE 2 DIABETES MELLITUS WITH DIABETIC POLYNEUROPATHY, WITH LONG-TERM CURRENT USE OF INSULIN (HCC): ICD-10-CM

## 2021-01-20 DIAGNOSIS — N18.32 STAGE 3B CHRONIC KIDNEY DISEASE (HCC): ICD-10-CM

## 2021-01-20 DIAGNOSIS — Z79.4 TYPE 2 DIABETES MELLITUS WITH FOOT ULCER, WITH LONG-TERM CURRENT USE OF INSULIN (HCC): ICD-10-CM

## 2021-01-20 RX ORDER — INSULIN ASPART 100 [IU]/ML
15 INJECTION, SOLUTION INTRAVENOUS; SUBCUTANEOUS
Qty: 5 PEN | Refills: 2 | Status: CANCELLED | OUTPATIENT
Start: 2021-01-20

## 2021-01-20 RX ORDER — SYRING-NEEDL,DISP,INSUL,0.3 ML 31GX15/64"
SYRINGE, EMPTY DISPOSABLE MISCELLANEOUS 3 TIMES DAILY
Qty: 100 EACH | Refills: 2 | Status: SHIPPED | OUTPATIENT
Start: 2021-01-20 | End: 2021-03-10 | Stop reason: SDUPTHER

## 2021-01-20 RX ORDER — INSULIN GLARGINE 100 [IU]/ML
50 INJECTION, SOLUTION SUBCUTANEOUS 2 TIMES DAILY
Qty: 10 PEN | Refills: 1 | Status: CANCELLED | OUTPATIENT
Start: 2021-01-20

## 2021-01-21 ENCOUNTER — PATIENT OUTREACH (OUTPATIENT)
Dept: FAMILY MEDICINE CLINIC | Facility: CLINIC | Age: 61
End: 2021-01-21

## 2021-01-21 DIAGNOSIS — I25.10 CORONARY ARTERY DISEASE INVOLVING NATIVE HEART WITHOUT ANGINA PECTORIS, UNSPECIFIED VESSEL OR LESION TYPE: ICD-10-CM

## 2021-01-21 DIAGNOSIS — N18.32 STAGE 3B CHRONIC KIDNEY DISEASE (HCC): ICD-10-CM

## 2021-01-21 DIAGNOSIS — E11.42 TYPE 2 DIABETES MELLITUS WITH DIABETIC POLYNEUROPATHY, WITH LONG-TERM CURRENT USE OF INSULIN (HCC): ICD-10-CM

## 2021-01-21 DIAGNOSIS — Z79.4 TYPE 2 DIABETES MELLITUS WITH DIABETIC POLYNEUROPATHY, WITH LONG-TERM CURRENT USE OF INSULIN (HCC): ICD-10-CM

## 2021-01-21 DIAGNOSIS — Z79.4 CURRENT USE OF INSULIN (HCC): ICD-10-CM

## 2021-01-21 RX ORDER — LISINOPRIL 10 MG/1
TABLET ORAL
Qty: 30 TABLET | Refills: 0 | OUTPATIENT
Start: 2021-01-21

## 2021-01-21 RX ORDER — LISINOPRIL 20 MG/1
20 TABLET ORAL DAILY
Qty: 30 TABLET | Refills: 6 | Status: SHIPPED | OUTPATIENT
Start: 2021-01-21 | End: 2021-03-10 | Stop reason: SDUPTHER

## 2021-01-21 RX ORDER — CITALOPRAM 40 MG/1
TABLET ORAL
Qty: 30 TABLET | Refills: 2 | Status: SHIPPED | OUTPATIENT
Start: 2021-01-21 | End: 2021-03-10 | Stop reason: SDUPTHER

## 2021-01-21 RX ORDER — BLOOD SUGAR DIAGNOSTIC
1 STRIP MISCELLANEOUS DAILY
Qty: 100 EACH | Refills: 2 | Status: SHIPPED | OUTPATIENT
Start: 2021-01-21 | End: 2021-03-26 | Stop reason: SDUPTHER

## 2021-01-21 NOTE — PROGRESS NOTES
I called the patient to follow up  She states she is "working really hard" to get her blood sugars under control  She notes she was disappointed that her hernia surgery had to be cancelled because of her elevated sugar  She reports her fasting levels have improved after the recent increase of her insulin with most in the 100's (one was 304)  She notes she had a low of 69 one day  The patient expressed frustration, stating "it bothers me", that healthy foods/diabetic foods are so expensive and she is on a limited income  She notes she completed a SNAP benefit application but did not return it as she is unsure what to do  I will send a note to [facility], CHW, to contact the patient to assist since she has helped her in the past  Note: I spoke to [facility]  We discussed the case and she will reach out to the patient  Patient notes she would like to have bariatric surgery at some point in the future  She realizes she needs her sugars under control to have any major surgery  She reports she is stopping herself from cheating and trying to better her health so she can "live longer"  I will continue to support and provide encouragement  I reminded the patient of her Echo appointment next week and she plans on attending  She states her son will provide transportation  The patient was very grateful for this call  I will continue to follow

## 2021-01-21 NOTE — PROGRESS NOTES
Outgoing Call:  1/21/21    CHW did call pt and discussed [employer] application and SNAP application mentioned by Evelia Mcmillan, RN  Pt stated that she did receive CHW's letter yesterday with Franklyn Sandifer consent forms and signed them  Pt also stated that she completed a SNAP benefits application and needs to submit to [facility]  CHW agreed to meet with pt on 1/25/21 at [facility] , prior to her appointment at [address] [place] [address] will bring all documents mentioned  Pt thanked CHW for call  Next outreach is scheduled for 1/25/21

## 2021-01-22 ENCOUNTER — TELEPHONE (OUTPATIENT)
Dept: FAMILY MEDICINE CLINIC | Facility: CLINIC | Age: 61
End: 2021-01-22

## 2021-01-22 DIAGNOSIS — Z79.4 TYPE 2 DIABETES MELLITUS WITH FOOT ULCER, WITH LONG-TERM CURRENT USE OF INSULIN (HCC): Primary | ICD-10-CM

## 2021-01-22 DIAGNOSIS — L97.509 TYPE 2 DIABETES MELLITUS WITH FOOT ULCER, WITH LONG-TERM CURRENT USE OF INSULIN (HCC): Primary | ICD-10-CM

## 2021-01-22 DIAGNOSIS — E11.621 TYPE 2 DIABETES MELLITUS WITH FOOT ULCER, WITH LONG-TERM CURRENT USE OF INSULIN (HCC): Primary | ICD-10-CM

## 2021-01-22 RX ORDER — PEN NEEDLE, DIABETIC 32 GX 1/4"
NEEDLE, DISPOSABLE MISCELLANEOUS 3 TIMES DAILY
Qty: 100 EACH | Refills: 5 | Status: SHIPPED | OUTPATIENT
Start: 2021-01-22 | End: 2021-03-10 | Stop reason: SDUPTHER

## 2021-01-22 NOTE — TELEPHONE ENCOUNTER
Pt called in states syringe tips were sent to the pharmacy instead of the insulin pen tip, pt needs it to be sent as soon as possible as she does not have any tips for the weekend

## 2021-01-25 ENCOUNTER — PATIENT OUTREACH (OUTPATIENT)
Dept: FAMILY MEDICINE CLINIC | Facility: CLINIC | Age: 61
End: 2021-01-25

## 2021-01-25 ENCOUNTER — HOSPITAL ENCOUNTER (OUTPATIENT)
Dept: NON INVASIVE DIAGNOSTICS | Facility: HOSPITAL | Age: 61
Discharge: HOME | End: 2021-01-25
Payer: COMMERCIAL

## 2021-01-25 DIAGNOSIS — I25.10 CORONARY ARTERY DISEASE INVOLVING NATIVE HEART WITHOUT ANGINA PECTORIS, UNSPECIFIED VESSEL OR LESION TYPE: ICD-10-CM

## 2021-01-25 DIAGNOSIS — R94.31 ABNORMAL EKG: ICD-10-CM

## 2021-01-25 DIAGNOSIS — I10 HYPERTENSION, UNSPECIFIED TYPE: ICD-10-CM

## 2021-01-25 PROCEDURE — 93306 TTE W/DOPPLER COMPLETE: CPT

## 2021-01-25 PROCEDURE — 93306 TTE W/DOPPLER COMPLETE: CPT | Performed by: INTERNAL MEDICINE

## 2021-01-25 NOTE — PROGRESS NOTES
In Person:  1/25/21    CHW did meet with pt at [address] [place] [address], located at [address] [place] [address], to provide her with a social security card replacement application  Pt is unable to apply for Kana Vick since she has a [place] stated ID  Pt must be able to provide proof of [place] ID in order to apply  Pt informed CHW that she lost her SS card and has not been able to get a [place] state ID due to this  CHW did assist pt in completing the application and CHW did mail out on behalf of pt  Pt did also provide CHW with a completed SNAP benefits application  CHW did review the application and faxed it to [facility]  Fax confirmation was received  Next outreach is scheduled for 2/1/21

## 2021-02-01 ENCOUNTER — PATIENT OUTREACH (OUTPATIENT)
Dept: FAMILY MEDICINE CLINIC | Facility: CLINIC | Age: 61
End: 2021-02-01

## 2021-02-01 NOTE — PROGRESS NOTES
I called the patient to follow up  She states she is feeling "ok"  She notes she is in the process of relocating  She lives with her daughter and her boyfriend and they are moving to a nearby location (address changed in the chart)  Patient reports her blood sugars are "ok I guess"  She notes her fasting readings range from 100-upper 200's  She states she is trying to make changes but it is difficult  She is taking her medications as prescribed  The patient does not need any medication refills at this time and has no upcoming appointments  I will continue to follow  The patient was appreciative of this call
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
## 2021-02-04 DIAGNOSIS — E11.42 TYPE 2 DIABETES MELLITUS WITH DIABETIC POLYNEUROPATHY, WITH LONG-TERM CURRENT USE OF INSULIN (HCC): ICD-10-CM

## 2021-02-04 DIAGNOSIS — N18.32 STAGE 3B CHRONIC KIDNEY DISEASE (HCC): ICD-10-CM

## 2021-02-04 DIAGNOSIS — Z79.4 TYPE 2 DIABETES MELLITUS WITH DIABETIC POLYNEUROPATHY, WITH LONG-TERM CURRENT USE OF INSULIN (HCC): ICD-10-CM

## 2021-02-04 DIAGNOSIS — I25.10 CORONARY ARTERY DISEASE INVOLVING NATIVE HEART WITHOUT ANGINA PECTORIS, UNSPECIFIED VESSEL OR LESION TYPE: ICD-10-CM

## 2021-02-04 DIAGNOSIS — Z79.4 CURRENT USE OF INSULIN (HCC): ICD-10-CM

## 2021-02-04 RX ORDER — ASPIRIN 81 MG/1
TABLET ORAL
Qty: 30 TABLET | Refills: 2 | Status: SHIPPED | OUTPATIENT
Start: 2021-02-04 | End: 2021-03-10 | Stop reason: SDUPTHER

## 2021-02-04 RX ORDER — CLOPIDOGREL BISULFATE 75 MG/1
TABLET ORAL
Qty: 30 TABLET | Refills: 2 | Status: SHIPPED | OUTPATIENT
Start: 2021-02-04 | End: 2021-03-10 | Stop reason: SDUPTHER

## 2021-02-08 ENCOUNTER — PATIENT OUTREACH (OUTPATIENT)
Dept: FAMILY MEDICINE CLINIC | Facility: CLINIC | Age: 61
End: 2021-02-08

## 2021-02-08 NOTE — PROGRESS NOTES
Outgoing Call:  2/8/21    CHW did call pt to discuss status of DPW and SSA applications  Pt stated that she does not believe she has received anything as of yet  CHW offered pt to facilitate a conference call with both agencies to check on status  Pt asked for a call back in a couple of days as she was actively in the process of moving to her daughters home at the time of call  CHW agreed to call pt on 2/10/21 to check on status of [place] benefits and [place] card  Next outreach is scheduled for 2/10/21

## 2021-02-11 ENCOUNTER — PATIENT OUTREACH (OUTPATIENT)
Dept: FAMILY MEDICINE CLINIC | Facility: CLINIC | Age: 61
End: 2021-02-11

## 2021-02-11 NOTE — PROGRESS NOTES
Outgoing Call:  2/11/21    CHW did call pt to follow up on status of DPW and SSA applications submitted  CHW had called pt earlier this week but pt was unable to speak as she was moving from her son's home to her daughter's home  Pt agreed CHW should call her back later in the week  CHW did leave a voice message requesting a call in return  Next outreach is scheduled for 2/12/21

## 2021-02-12 ENCOUNTER — PATIENT OUTREACH (OUTPATIENT)
Dept: FAMILY MEDICINE CLINIC | Facility: CLINIC | Age: 61
End: 2021-02-12

## 2021-02-12 NOTE — PROGRESS NOTES
Outgoing Call:  2/12/21    CHW did call pt to follow up on status of DPW and SSA applications submitted  CHW did leave a voice message requesting a call in return as pt did not answer call  Next outreach is scheduled for 2/19/21

## 2021-02-15 ENCOUNTER — PATIENT OUTREACH (OUTPATIENT)
Dept: FAMILY MEDICINE CLINIC | Facility: CLINIC | Age: 61
End: 2021-02-15

## 2021-02-15 DIAGNOSIS — Z79.4 CURRENT USE OF INSULIN (HCC): ICD-10-CM

## 2021-02-15 DIAGNOSIS — E11.42 TYPE 2 DIABETES MELLITUS WITH DIABETIC POLYNEUROPATHY, WITH LONG-TERM CURRENT USE OF INSULIN (HCC): ICD-10-CM

## 2021-02-15 DIAGNOSIS — N18.32 STAGE 3B CHRONIC KIDNEY DISEASE (HCC): ICD-10-CM

## 2021-02-15 DIAGNOSIS — I25.10 CORONARY ARTERY DISEASE INVOLVING NATIVE HEART WITHOUT ANGINA PECTORIS, UNSPECIFIED VESSEL OR LESION TYPE: ICD-10-CM

## 2021-02-15 DIAGNOSIS — Z79.4 TYPE 2 DIABETES MELLITUS WITH DIABETIC POLYNEUROPATHY, WITH LONG-TERM CURRENT USE OF INSULIN (HCC): ICD-10-CM

## 2021-02-15 RX ORDER — LANCETS 33 GAUGE
EACH MISCELLANEOUS
Qty: 100 EACH | Refills: 2 | Status: SHIPPED | OUTPATIENT
Start: 2021-02-15 | End: 2021-03-10 | Stop reason: SDUPTHER

## 2021-02-15 NOTE — PROGRESS NOTES
Outgoing Call:  2/15/2021    CHW did call pt to discuss status of SS card and [place] application  Pt stated that she has not checked her mail at her son's home in a couple of days and that she does not believe she has received her new social security card  CHW offered to assist pt in calling tomorrow since government offices are closed today due to holiday  Pt agreed that would be a good idea and asked CHW if she could call around [address] agreed  Next outreach is scheduled for 2/16/2021

## 2021-02-16 ENCOUNTER — PATIENT OUTREACH (OUTPATIENT)
Dept: FAMILY MEDICINE CLINIC | Facility: CLINIC | Age: 61
End: 2021-02-16

## 2021-02-16 DIAGNOSIS — E11.621 TYPE 2 DIABETES MELLITUS WITH FOOT ULCER, WITH LONG-TERM CURRENT USE OF INSULIN (HCC): ICD-10-CM

## 2021-02-16 DIAGNOSIS — L97.509 TYPE 2 DIABETES MELLITUS WITH FOOT ULCER, WITH LONG-TERM CURRENT USE OF INSULIN (HCC): Primary | ICD-10-CM

## 2021-02-16 DIAGNOSIS — E11.42 TYPE 2 DIABETES MELLITUS WITH DIABETIC POLYNEUROPATHY, WITH LONG-TERM CURRENT USE OF INSULIN (HCC): ICD-10-CM

## 2021-02-16 DIAGNOSIS — I25.10 CORONARY ARTERY DISEASE INVOLVING NATIVE HEART WITHOUT ANGINA PECTORIS, UNSPECIFIED VESSEL OR LESION TYPE: ICD-10-CM

## 2021-02-16 DIAGNOSIS — L97.509 TYPE 2 DIABETES MELLITUS WITH FOOT ULCER, WITH LONG-TERM CURRENT USE OF INSULIN (HCC): ICD-10-CM

## 2021-02-16 DIAGNOSIS — N18.32 STAGE 3B CHRONIC KIDNEY DISEASE (HCC): ICD-10-CM

## 2021-02-16 DIAGNOSIS — Z79.4 TYPE 2 DIABETES MELLITUS WITH FOOT ULCER, WITH LONG-TERM CURRENT USE OF INSULIN (HCC): Primary | ICD-10-CM

## 2021-02-16 DIAGNOSIS — Z79.4 TYPE 2 DIABETES MELLITUS WITH FOOT ULCER, WITH LONG-TERM CURRENT USE OF INSULIN (HCC): ICD-10-CM

## 2021-02-16 DIAGNOSIS — Z79.4 CURRENT USE OF INSULIN (HCC): ICD-10-CM

## 2021-02-16 DIAGNOSIS — E11.621 TYPE 2 DIABETES MELLITUS WITH FOOT ULCER, WITH LONG-TERM CURRENT USE OF INSULIN (HCC): Primary | ICD-10-CM

## 2021-02-16 DIAGNOSIS — Z79.4 TYPE 2 DIABETES MELLITUS WITH DIABETIC POLYNEUROPATHY, WITH LONG-TERM CURRENT USE OF INSULIN (HCC): ICD-10-CM

## 2021-02-16 RX ORDER — INSULIN ASPART 100 [IU]/ML
INJECTION, SOLUTION INTRAVENOUS; SUBCUTANEOUS
Refills: 2 | OUTPATIENT
Start: 2021-02-16

## 2021-02-16 RX ORDER — INSULIN GLARGINE 100 [IU]/ML
50 INJECTION, SOLUTION SUBCUTANEOUS 2 TIMES DAILY
Qty: 5 PEN | Refills: 1 | OUTPATIENT
Start: 2021-02-16

## 2021-02-16 RX ORDER — INSULIN LISPRO 100 [IU]/ML
15 INJECTION, SOLUTION INTRAVENOUS; SUBCUTANEOUS
Qty: 5 PEN | Refills: 1 | Status: SHIPPED | OUTPATIENT
Start: 2021-02-16 | End: 2021-03-10 | Stop reason: SDUPTHER

## 2021-02-16 NOTE — PROGRESS NOTES
Outgoing Calls:  2/16/2021    CHW did call pt and was able to facilitate a conference call with LANRE SOLIZ  We were assisted by Hui Walker who did inform pt and CHW that the [place] application for pt which CHW scanned to [facility] on 1/25/21 was processed and approved  Pt will continue to have medical assistance coverage  Currently pt is not eligible for SNAP benefits until her daughter reaches the age of 25  Pt expressed understanding  Pt asked CHW if she could call back tomorrow for the call to [employer]  Pt was tired as we were on the phone for over half an hour waiting for a rep to answer  CHW agreed  Next outreach is scheduled for 2/17/2021

## 2021-02-17 ENCOUNTER — PATIENT OUTREACH (OUTPATIENT)
Dept: FAMILY MEDICINE CLINIC | Facility: CLINIC | Age: 61
End: 2021-02-17

## 2021-02-17 ENCOUNTER — OFFICE VISIT (OUTPATIENT)
Dept: FAMILY MEDICINE CLINIC | Facility: CLINIC | Age: 61
End: 2021-02-17

## 2021-02-17 ENCOUNTER — APPOINTMENT (OUTPATIENT)
Dept: LAB | Facility: HOSPITAL | Age: 61
End: 2021-02-17
Payer: COMMERCIAL

## 2021-02-17 VITALS
SYSTOLIC BLOOD PRESSURE: 140 MMHG | HEART RATE: 78 BPM | DIASTOLIC BLOOD PRESSURE: 76 MMHG | TEMPERATURE: 98 F | WEIGHT: 280 LBS | RESPIRATION RATE: 16 BRPM | OXYGEN SATURATION: 98 % | BODY MASS INDEX: 42.57 KG/M2

## 2021-02-17 DIAGNOSIS — R41.0 CONFUSION: ICD-10-CM

## 2021-02-17 DIAGNOSIS — R42 DIZZINESS: ICD-10-CM

## 2021-02-17 DIAGNOSIS — R42 DIZZINESS: Primary | ICD-10-CM

## 2021-02-17 LAB
ALBUMIN SERPL BCP-MCNC: 3.5 G/DL (ref 3–5.2)
ALP SERPL-CCNC: 107 U/L (ref 43–122)
ALT SERPL W P-5'-P-CCNC: 11 U/L (ref 9–52)
AMMONIA PLAS-SCNC: <9 UMOL/L (ref 9–33)
ANION GAP SERPL CALCULATED.3IONS-SCNC: 5 MMOL/L (ref 5–14)
AST SERPL W P-5'-P-CCNC: 29 U/L (ref 14–36)
BASOPHILS # BLD AUTO: 0.1 THOUSANDS/ΜL (ref 0–0.1)
BASOPHILS NFR BLD AUTO: 1 % (ref 0–1)
BILIRUB SERPL-MCNC: 0.3 MG/DL
BUN SERPL-MCNC: 38 MG/DL (ref 5–25)
CALCIUM SERPL-MCNC: 9.1 MG/DL (ref 8.4–10.2)
CHLORIDE SERPL-SCNC: 109 MMOL/L (ref 97–108)
CO2 SERPL-SCNC: 28 MMOL/L (ref 22–30)
CREAT SERPL-MCNC: 1.79 MG/DL (ref 0.6–1.2)
EOSINOPHIL # BLD AUTO: 0.3 THOUSAND/ΜL (ref 0–0.4)
EOSINOPHIL NFR BLD AUTO: 4 % (ref 0–6)
ERYTHROCYTE [DISTWIDTH] IN BLOOD BY AUTOMATED COUNT: 14.9 %
GFR SERPL CREATININE-BSD FRML MDRD: 31 ML/MIN/1.73SQ M
GLUCOSE SERPL-MCNC: 95 MG/DL (ref 70–99)
HCT VFR BLD AUTO: 33.1 % (ref 36–46)
HGB BLD-MCNC: 10.7 G/DL (ref 12–16)
LYMPHOCYTES # BLD AUTO: 2.4 THOUSANDS/ΜL (ref 0.5–4)
LYMPHOCYTES NFR BLD AUTO: 28 % (ref 25–45)
MAGNESIUM SERPL-MCNC: 2 MG/DL (ref 1.6–2.3)
MCH RBC QN AUTO: 30.6 PG (ref 26–34)
MCHC RBC AUTO-ENTMCNC: 32.2 G/DL (ref 31–36)
MCV RBC AUTO: 95 FL (ref 80–100)
MONOCYTES # BLD AUTO: 0.7 THOUSAND/ΜL (ref 0.2–0.9)
MONOCYTES NFR BLD AUTO: 8 % (ref 1–10)
NEUTROPHILS # BLD AUTO: 5.1 THOUSANDS/ΜL (ref 1.8–7.8)
NEUTS SEG NFR BLD AUTO: 60 % (ref 45–65)
PLATELET # BLD AUTO: 323 THOUSANDS/UL (ref 150–450)
PMV BLD AUTO: 7.8 FL (ref 8.9–12.7)
POTASSIUM SERPL-SCNC: 5.6 MMOL/L (ref 3.6–5)
PROT SERPL-MCNC: 6.4 G/DL (ref 5.9–8.4)
RBC # BLD AUTO: 3.48 MILLION/UL (ref 4–5.2)
SODIUM SERPL-SCNC: 142 MMOL/L (ref 137–147)
TSH SERPL DL<=0.05 MIU/L-ACNC: 6.03 UIU/ML (ref 0.47–4.68)
WBC # BLD AUTO: 8.5 THOUSAND/UL (ref 4.5–11)

## 2021-02-17 PROCEDURE — 82728 ASSAY OF FERRITIN: CPT

## 2021-02-17 PROCEDURE — 83735 ASSAY OF MAGNESIUM: CPT

## 2021-02-17 PROCEDURE — 83550 IRON BINDING TEST: CPT

## 2021-02-17 PROCEDURE — 36415 COLL VENOUS BLD VENIPUNCTURE: CPT

## 2021-02-17 PROCEDURE — 80053 COMPREHEN METABOLIC PANEL: CPT

## 2021-02-17 PROCEDURE — 82140 ASSAY OF AMMONIA: CPT

## 2021-02-17 PROCEDURE — 83540 ASSAY OF IRON: CPT

## 2021-02-17 PROCEDURE — 84439 ASSAY OF FREE THYROXINE: CPT

## 2021-02-17 PROCEDURE — 85025 COMPLETE CBC W/AUTO DIFF WBC: CPT

## 2021-02-17 PROCEDURE — 84443 ASSAY THYROID STIM HORMONE: CPT

## 2021-02-17 PROCEDURE — 99213 OFFICE O/P EST LOW 20 MIN: CPT | Performed by: PHYSICIAN ASSISTANT

## 2021-02-17 RX ORDER — LISINOPRIL 10 MG/1
10 TABLET ORAL DAILY
COMMUNITY
Start: 2020-12-29 | End: 2021-03-10

## 2021-02-17 NOTE — PROGRESS NOTES
Assessment/Plan:    Dizziness  - Advised to complete previously order labs by PCP-  CBC, CMP, TSH, iron panel, magnesium, ammonia to rule out causes of dizziness     - Advised to continue to use cane at all times to prevent falls  - Advised to follow-up with eye doctor as scheduled in April  Diagnoses and all orders for this visit:    Dizziness    Other orders  -     lisinopril (ZESTRIL) 10 mg tablet; Take 10 mg by mouth daily          All of patients questions were answered  Patient understands and agrees with the above plan  Return in about 4 weeks (around 3/17/2021) for Next scheduled follow up with PCP- dizziness, T2DM, memory difficulties in person  Yany Childress PA-C  02/17/21  [facility]           Subjective:     Patient ID: Mary Solis  is a 64 y o  female  has a past medical history of Abnormal liver function, Anemia, Anxiety, Arthritis, Chronic kidney disease, Chronic narcotic dependence ([facility] ), Chronic pain disorder, Coronary artery disease, Depression, Diabetes mellitus ([facility] [address] ), GERD (gastroesophageal reflux disease), Heart murmur, Hyperlipidemia, Hypertension, Open toe wound, Renal disorder, Shortness of breath, and Sleep apnea  who presents today in office for same-day visit for Dizziness      - Patient is a 64 y o  female who presents today for same-day visit for dizziness  Patient notes for the past few weeks she has been experiencing some dizziness and lightheadedness when walking  Patient notes she has also been unsteady on her feet  Patient notes usually she could walk around her apartment without using a cane, but more recently she has had to use the cane to steady herself  Patient notes she experiences the dizziness and lightheadedness when she is walking  Patient notes if she stops walking, then the symptoms resolved  Patient denies experiencing any symptoms while sitting  Patient denies any falls    Patient notes she does have chronic pain in her back, legs, knees due to fibromyalgia  Patient is a diabetic and has been working to improve her blood sugars  Patient notes her fasting blood sugar readings in the morning have been under 200, which is an improvement for her  Patient does follow with endocrinology  Patient notes it has been 4-5 years since her last eye exam   Patient notes she definitely needs new glasses  Patient notes she has a scheduled appointment with her eye doctor in April  The following portions of the patient's history were reviewed and updated as appropriate: allergies, current medications, past family history, past medical history, past social history, past surgical history and problem list         Review of Systems   Constitutional: Negative for chills, fatigue and fever  HENT: Negative for congestion and sore throat  Eyes: Negative for pain  Respiratory: Negative for cough, chest tightness and shortness of breath  Cardiovascular: Negative for chest pain  Gastrointestinal: Negative for abdominal pain, constipation, diarrhea, nausea and vomiting  Genitourinary: Negative for difficulty urinating and dysuria  Musculoskeletal: Positive for arthralgias and back pain  Skin: Negative for rash  Neurological: Positive for dizziness and light-headedness  Negative for headaches  Unsteady on feet                 Objective:   Vitals:    02/17/21 1608   BP: 140/76   BP Location: Right arm   Patient Position: Sitting   Cuff Size: Large   Pulse: 78   Resp: 16   Temp: 98 °F (36 7 °C)   TempSrc: Temporal   SpO2: 98%   Weight: 127 kg (280 lb)         Physical Exam  Vitals signs and nursing note reviewed  Constitutional:       Appearance: She is well-developed  Comments: Using cane with ambulation  HENT:      Head: Normocephalic and atraumatic        Right Ear: External ear normal       Left Ear: External ear normal       Nose: Nose normal    Eyes:      Conjunctiva/sclera: Conjunctivae normal    Neck:      Musculoskeletal: Normal range of motion and neck supple  Cardiovascular:      Rate and Rhythm: Normal rate and regular rhythm  Heart sounds: Normal heart sounds  No murmur  Pulmonary:      Effort: Pulmonary effort is normal       Breath sounds: Normal breath sounds  No wheezing  Skin:     General: Skin is warm and dry  Neurological:      Mental Status: She is alert and oriented to person, place, and time     Psychiatric:         Speech: Speech normal

## 2021-02-17 NOTE — PROGRESS NOTES
Chart Review:  2/17/2021    CHW did complete chart review and did see that pt had communicated with Valentin Barnes RN, that she was not feeling well  [place] was able to secure an appointment for pt to be seen today  Martha did advise CHW to hold off on calling pt today due to current health concern  CHW agreed  Next outreach is scheduled for 2/19/2021

## 2021-02-17 NOTE — PROGRESS NOTES
I called the patient to inform her she would need an in-office appointment to address her concern of dementia  She was relieved to hear this and proceeded to tell me her dizziness is slightly worse since I spoke with her 2 days ago  She notes she is now walking everywhere with her cane, where previously she would not need to  She also reports her left hand and left ankle are slightly swollen  She denies any numbness, tingling or any other symptoms  The patient had no slurring of words during our conversation and her speech was not impaired  I informed the patient if at any time she develops more symptoms or feels something does not feel right, to have her daughter take her to the ED or call the ambulance and she understood  In the meantime, I strongly urged her to consistently use her cane, avoid throw rugs and elevate her left leg if possible  I will continue to follow

## 2021-02-18 DIAGNOSIS — E03.9 HYPOTHYROIDISM, UNSPECIFIED TYPE: Primary | ICD-10-CM

## 2021-02-18 DIAGNOSIS — E87.5 HYPERKALEMIA: ICD-10-CM

## 2021-02-18 LAB
FERRITIN SERPL-MCNC: 32 NG/ML (ref 8–388)
IRON SATN MFR SERPL: 35 %
IRON SERPL-MCNC: 83 UG/DL (ref 50–170)
T4 FREE SERPL-MCNC: 0.7 NG/DL (ref 0.76–1.46)
TIBC SERPL-MCNC: 235 UG/DL (ref 250–450)

## 2021-02-18 RX ORDER — LEVOTHYROXINE SODIUM 0.05 MG/1
50 TABLET ORAL DAILY
Qty: 60 TABLET | Refills: 0 | Status: SHIPPED | OUTPATIENT
Start: 2021-02-18 | End: 2021-04-19

## 2021-02-18 RX ORDER — SODIUM POLYSTYRENE SULFONATE 15 G/60ML
15 SUSPENSION ORAL; RECTAL ONCE
Qty: 60 ML | Refills: 0 | Status: SHIPPED | OUTPATIENT
Start: 2021-02-18 | End: 2021-02-18

## 2021-02-23 ENCOUNTER — PATIENT OUTREACH (OUTPATIENT)
Dept: FAMILY MEDICINE CLINIC | Facility: CLINIC | Age: 61
End: 2021-02-23

## 2021-02-23 NOTE — PROGRESS NOTES
Outgoing Call:  2/23/2021    CHW did call pt to discuss status of social security card  Pt was not available at time of call  CHW did leave a detailed voice message requesting a call in return  Next outreach is scheduled for 2/25/2021

## 2021-02-24 ENCOUNTER — PATIENT OUTREACH (OUTPATIENT)
Dept: FAMILY MEDICINE CLINIC | Facility: CLINIC | Age: 61
End: 2021-02-24

## 2021-02-24 NOTE — PROGRESS NOTES
I called the patient but received voicemail  Message was left asking for a return call  I will make further outreach attempts

## 2021-02-25 ENCOUNTER — OFFICE VISIT (OUTPATIENT)
Dept: FAMILY MEDICINE CLINIC | Facility: CLINIC | Age: 61
End: 2021-02-25

## 2021-02-25 ENCOUNTER — PATIENT OUTREACH (OUTPATIENT)
Dept: FAMILY MEDICINE CLINIC | Facility: CLINIC | Age: 61
End: 2021-02-25

## 2021-02-25 ENCOUNTER — HOSPITAL ENCOUNTER (OUTPATIENT)
Dept: RADIOLOGY | Facility: HOSPITAL | Age: 61
Discharge: HOME | End: 2021-02-25
Payer: COMMERCIAL

## 2021-02-25 VITALS
HEART RATE: 82 BPM | WEIGHT: 279 LBS | SYSTOLIC BLOOD PRESSURE: 150 MMHG | BODY MASS INDEX: 42.42 KG/M2 | OXYGEN SATURATION: 95 % | DIASTOLIC BLOOD PRESSURE: 90 MMHG | RESPIRATION RATE: 16 BRPM | TEMPERATURE: 98 F

## 2021-02-25 DIAGNOSIS — L03.012 CELLULITIS OF FINGER OF LEFT HAND: Primary | ICD-10-CM

## 2021-02-25 DIAGNOSIS — L98.491 SKIN ULCER OF HAND, LIMITED TO BREAKDOWN OF SKIN (HCC): ICD-10-CM

## 2021-02-25 DIAGNOSIS — L03.012 CELLULITIS OF FINGER OF LEFT HAND: ICD-10-CM

## 2021-02-25 PROCEDURE — 99214 OFFICE O/P EST MOD 30 MIN: CPT | Performed by: PHYSICIAN ASSISTANT

## 2021-02-25 PROCEDURE — 73140 X-RAY EXAM OF FINGER(S): CPT

## 2021-02-25 RX ORDER — CEPHALEXIN 500 MG/1
500 CAPSULE ORAL EVERY 12 HOURS SCHEDULED
Qty: 20 CAPSULE | Refills: 0 | Status: SHIPPED | OUTPATIENT
Start: 2021-02-25 | End: 2021-03-07

## 2021-02-25 NOTE — ASSESSMENT & PLAN NOTE
Referred to wound care  Recommend keeping wound clean and dry   Xray ordered now to r/o osteomyelitis

## 2021-02-25 NOTE — PROGRESS NOTES
In Person:  2/25/2021    CHW did meet with pt at [facility] prior to her PCP appointment  CHW informed pt that she has attempted to contact her twice in the past week  Pt stated that she has not received her messages  CHW asked pt if she had received her social security card and pt stated that she had not  CHW asked pt if she would be available tomorrow to call [facility] and inquire about it, with CHW  Pt stated that it would be ok to call her after 1PM  CHW agreed to call and facilitate a conference call with [facility]  Next outreach is scheduled for 2/26/2021

## 2021-02-25 NOTE — PROGRESS NOTES
Assessment/Plan:    Skin ulcer of hand, limited to breakdown of skin ([address] )  Referred to wound care  Recommend keeping wound clean and dry  Xray ordered now to r/o osteomyelitis         Problem List Items Addressed This Visit        Musculoskeletal and Integument    Skin ulcer of hand, limited to breakdown of skin ([address] )     Referred to wound care  Recommend keeping wound clean and dry  Xray ordered now to r/o osteomyelitis         Relevant Orders    Ambulatory referral to Wound Care      Other Visit Diagnoses     Cellulitis of finger of left hand    -  Primary    Relevant Medications    cephalexin (KEFLEX) 500 mg capsule    Other Relevant Orders    XR finger left third digit-middle    Ambulatory referral to Wound Care            Subjective:      Patient ID: Messi Stack is a 64 y o  female  HPI  32-year-old female with type 2 diabetes with history skin complications here for a problem with her left middle finger  For about 2 weeks she has had redness, swelling and pain on left 3rd finger  She had prior surgery on this finger in the past   She does get frequent burns due to neuropathy and she thinks she could have burned herself on the oven  She had a blister in the area first and then it became a wound  No f/c  She is diabetic and BS is typically in the 200s  The following portions of the patient's history were reviewed and updated as appropriate:   She  has a past medical history of Abnormal liver function, Anemia, Anxiety, Arthritis, Chronic kidney disease, Chronic narcotic dependence ([address] ), Chronic pain disorder, Coronary artery disease, Depression, Diabetes mellitus ([address] ), GERD (gastroesophageal reflux disease), Heart murmur, Hyperlipidemia, Hypertension, Open toe wound (12/2020), Renal disorder, Shortness of breath, and Sleep apnea    She   Patient Active Problem List    Diagnosis Date Noted    Skin ulcer of hand, limited to breakdown of skin ([address] ) 02/25/2021    HTN (hypertension) 12/21/2020    Diabetic ulcer of toe of right foot associated with type 2 diabetes mellitus, with muscle involvement without evidence of necrosis ([facility] ) 11/25/2020    Head lump 11/11/2020    Need for follow-up by  11/11/2020    Normochromic normocytic anemia 09/09/2020    CKD (chronic kidney disease) stage 3, GFR 30-59 ml/min 09/09/2020    Abnormal LFTs 09/09/2020    S/P cervical spinal fusion 09/09/2020    Major depressive disorder 09/02/2020    Type 2 diabetes mellitus, with long-term current use of insulin ([facility] ) 09/02/2020    Anxiety 09/02/2020    CAD (coronary artery disease) 09/02/2020    Osteoarthritis of left knee 09/02/2020    Healthcare maintenance 09/02/2020    Cellulitis of finger of right hand 08/26/2020     She  has a past surgical history that includes Knee surgery; Carpal tunnel release (Bilateral); Trigger finger release (Right); Toe amputation (Left); Hysterectomy (2008); Oophorectomy (2008); Breast excisional biopsy (Left); Amputation; Breast surgery; Cardiac catheterization; Cervical fusion; Angioplasty (2017); and pr exc skin benig 3 1-4 cm remaindr body (N/A, 12/21/2020)  Her family history includes Heart disease in her father; No Known Problems in her daughter, maternal aunt, maternal aunt, maternal aunt, maternal aunt, maternal aunt, maternal aunt, maternal grandfather, maternal grandmother, mother, paternal aunt, paternal grandfather, paternal grandmother, and sister; Stroke in her father  She  reports that she quit smoking about 9 years ago  Her smoking use included cigarettes  She has a 35 00 pack-year smoking history  She has never used smokeless tobacco  She reports previous alcohol use  She reports that she does not use drugs    Current Outpatient Medications   Medication Sig Dispense Refill    allopurinol (ZYLOPRIM) 300 mg tablet Take 300 mg by mouth daily      aspirin (ECOTRIN LOW STRENGTH) 81 mg EC tablet TAKE 1 TABLET (81 MG TOTAL) BY MOUTH ONCE FOR 1 DOSE 30 tablet 2  atorvastatin (LIPITOR) 40 mg tablet Take 1 tablet (40 mg total) by mouth daily 30 tablet 2    carvedilol (COREG) 6 25 mg tablet Take 1 tablet (6 25 mg total) by mouth 2 (two) times a day with meals 60 tablet 2    citalopram (CeleXA) 40 mg tablet TAKE 1 TABLET BY MOUTH EVERY DAY 30 tablet 2    clopidogrel (PLAVIX) 75 mg tablet TAKE 1 TABLET BY MOUTH EVERY DAY 30 tablet 2    Diclofenac Sodium (VOLTAREN) 1 % Apply 2 g topically 4 (four) times a day 100 g 1    diphenhydrAMINE (BENADRYL) 25 mg capsule Take 25 mg by mouth every 4 (four) hours as needed for allergies or sleep       Dulaglutide 1 5 MG/0 5ML SOPN Inject 0 5 mL (1 5 mg total) under the skin once a week 4 pen 3    gabapentin (NEURONTIN) 600 MG tablet Take 1 tablet (600 mg total) by mouth 3 (three) times a day 90 tablet 2    glucose blood (OneTouch Ultra) test strip Use 1 each daily Use as instructed 100 each 2    hydrochlorothiazide (MICROZIDE) 12 5 mg capsule Take 1 capsule (12 5 mg total) by mouth daily 30 capsule 2    HYDROcodone-acetaminophen (NORCO) 5-325 mg per tablet Take 2 tablets by mouth 2 (two) times a day as needed for painMax Daily Amount: 4 tablets 120 tablet 0    insulin glargine (Basaglar KwikPen) 100 units/mL injection pen Inject 50 Units under the skin 2 (two) times a day 10 pen 1    insulin lispro (HumaLOG KwikPen) 100 units/mL injection pen Inject 15 Units under the skin 3 (three) times a day with meals 5 pen 1    Insulin Pen Needle (BD Pen Needle Micro U/F) 32G X 6 MM MISC Use 3 (three) times a day 100 each 5    Insulin Syringe-Needle U-100 (BD Veo Insulin Syringe U/F) 31G X 15/64" 0 5 ML MISC Inject under the skin 3 (three) times a day 100 each 2    Lancets (OneTouch Delica Plus [id number]) MISC USE TO TEST 3 TIMES DAILY 100 each 2    levothyroxine 50 mcg tablet Take 1 tablet (50 mcg total) by mouth daily 60 tablet 0    lisinopril (ZESTRIL) 10 mg tablet Take 10 mg by mouth daily      lisinopril (ZESTRIL) 20 mg tablet Take 1 tablet (20 mg total) by mouth daily 30 tablet 6    naloxone (NARCAN) 4 mg/0 1 mL nasal spray Administer 1 spray into a nostril  If breathing does not return to normal or if breathing difficulty resumes after 2-3 minutes, give another dose in the other nostril using a new spray  1 each 1    nitroglycerin (NITROSTAT) 0 4 mg SL tablet Place 1 tablet (0 4 mg total) under the tongue every 5 (five) minutes as needed for chest pain 25 tablet 1    OLANZapine (ZyPREXA) 5 mg tablet Take 5 mg by mouth daily at bedtime      cephalexin (KEFLEX) 500 mg capsule Take 1 capsule (500 mg total) by mouth every 12 (twelve) hours for 10 days 20 capsule 0    lactulose (CHRONULAC) 10 g/15 mL solution Take 20 g by mouth daily at bedtime For elev Ammonia level      povidone-iodine 10 % Apply topically once as needed for wound care for up to 1 dose (Patient not taking: Reported on 2/17/2021) 100 each 0     No current facility-administered medications for this visit        Facility-Administered Medications Ordered in Other Visits   Medication Dose Route Frequency Provider Last Rate Last Admin    doxycycline hyclate (VIBRAMYCIN) capsule 100 mg  100 mg Oral Once Mirella Craig MD         Current Outpatient Medications on File Prior to Visit   Medication Sig    allopurinol (ZYLOPRIM) 300 mg tablet Take 300 mg by mouth daily    aspirin (ECOTRIN LOW STRENGTH) 81 mg EC tablet TAKE 1 TABLET (81 MG TOTAL) BY MOUTH ONCE FOR 1 DOSE    atorvastatin (LIPITOR) 40 mg tablet Take 1 tablet (40 mg total) by mouth daily    carvedilol (COREG) 6 25 mg tablet Take 1 tablet (6 25 mg total) by mouth 2 (two) times a day with meals    citalopram (CeleXA) 40 mg tablet TAKE 1 TABLET BY MOUTH EVERY DAY    clopidogrel (PLAVIX) 75 mg tablet TAKE 1 TABLET BY MOUTH EVERY DAY    Diclofenac Sodium (VOLTAREN) 1 % Apply 2 g topically 4 (four) times a day    diphenhydrAMINE (BENADRYL) 25 mg capsule Take 25 mg by mouth every 4 (four) hours as needed for allergies or sleep     Dulaglutide 1 5 MG/0 5ML SOPN Inject 0 5 mL (1 5 mg total) under the skin once a week    gabapentin (NEURONTIN) 600 MG tablet Take 1 tablet (600 mg total) by mouth 3 (three) times a day    glucose blood (OneTouch Ultra) test strip Use 1 each daily Use as instructed    hydrochlorothiazide (MICROZIDE) 12 5 mg capsule Take 1 capsule (12 5 mg total) by mouth daily    HYDROcodone-acetaminophen (NORCO) 5-325 mg per tablet Take 2 tablets by mouth 2 (two) times a day as needed for painMax Daily Amount: 4 tablets    insulin glargine (Basaglar KwikPen) 100 units/mL injection pen Inject 50 Units under the skin 2 (two) times a day    insulin lispro (HumaLOG KwikPen) 100 units/mL injection pen Inject 15 Units under the skin 3 (three) times a day with meals    Insulin Pen Needle (BD Pen Needle Micro U/F) 32G X 6 MM MISC Use 3 (three) times a day    Insulin Syringe-Needle U-100 (BD Veo Insulin Syringe U/F) 31G X 15/64" 0 5 ML MISC Inject under the skin 3 (three) times a day    Lancets (OneTouch Delica Plus [id number]) MISC USE TO TEST 3 TIMES DAILY    levothyroxine 50 mcg tablet Take 1 tablet (50 mcg total) by mouth daily    lisinopril (ZESTRIL) 10 mg tablet Take 10 mg by mouth daily    lisinopril (ZESTRIL) 20 mg tablet Take 1 tablet (20 mg total) by mouth daily    naloxone (NARCAN) 4 mg/0 1 mL nasal spray Administer 1 spray into a nostril  If breathing does not return to normal or if breathing difficulty resumes after 2-3 minutes, give another dose in the other nostril using a new spray      nitroglycerin (NITROSTAT) 0 4 mg SL tablet Place 1 tablet (0 4 mg total) under the tongue every 5 (five) minutes as needed for chest pain    OLANZapine (ZyPREXA) 5 mg tablet Take 5 mg by mouth daily at bedtime    lactulose (CHRONULAC) 10 g/15 mL solution Take 20 g by mouth daily at bedtime For elev Ammonia level    povidone-iodine 10 % Apply topically once as needed for wound care for up to 1 dose (Patient not taking: Reported on 2/17/2021)     Current Facility-Administered Medications on File Prior to Visit   Medication    doxycycline hyclate (VIBRAMYCIN) capsule 100 mg     She is allergic to codeine       Review of Systems   Constitutional: Negative for chills and fever  HENT: Negative for sore throat  Respiratory: Negative for cough and shortness of breath  Cardiovascular: Negative for chest pain  Musculoskeletal: Positive for arthralgias  Skin: Positive for rash and wound  Objective:      /90 (BP Location: Left arm, Patient Position: Sitting, Cuff Size: Large)   Pulse 82   Temp 98 °F (36 7 °C) (Temporal)   Resp 16   Wt 127 kg (279 lb)   SpO2 95%   BMI 42 42 kg/m²          Physical Exam  Nursing note reviewed  Exam conducted with a chaperone present  Constitutional:       Appearance: She is obese  Pulmonary:      Effort: Pulmonary effort is normal    Skin:     Findings: Rash (left 3rd finger 1 5x1cm ulcer wtih surrounding redness/swelling/warmth) present  Neurological:      Mental Status: She is alert     Psychiatric:         Mood and Affect: Mood normal          Behavior: Behavior normal

## 2021-02-25 NOTE — PATIENT INSTRUCTIONS
Cellulitis   WHAT YOU NEED TO KNOW:   Cellulitis is a skin infection caused by bacteria  Cellulitis may go away on its own or you may need treatment  Your healthcare provider may draw a [place] around the outside edges of your cellulitis  If your cellulitis spreads, your healthcare provider will see it outside of the [place]  DISCHARGE INSTRUCTIONS:   Call 911 if:   · You have sudden trouble breathing or chest pain  Return to the emergency department if:   · Your wound gets larger and more painful  · You feel a crackling under your skin when you touch it  · You have purple dots or bumps on your skin, or you see bleeding under your skin  · You have new swelling and pain in your legs  · The red, warm, swollen area gets larger  · You see red streaks coming from the infected area  Contact your healthcare provider if:   · You have a fever  · Your fever or pain does not go away or gets worse  · The area does not get smaller after 2 days of antibiotics  · Your skin is flaking or peeling off  · You have questions or concerns about your condition or care  Medicines:   · Antibiotics  help treat the bacterial infection  · NSAIDs , such as ibuprofen, help decrease swelling, pain, and fever  NSAIDs can cause stomach bleeding or kidney problems in certain people  If you take blood thinner medicine, always ask if NSAIDs are safe for you  Always read the medicine label and follow directions  Do not give these medicines to children under 10months of age without direction from your child's healthcare provider  · Acetaminophen  decreases pain and fever  It is available without a doctor's order  Ask how much to take and how often to take it  Follow directions  Read the labels of all other medicines you are using to see if they also contain acetaminophen, or ask your doctor or pharmacist  Acetaminophen can cause liver damage if not taken correctly   Do not use more than 4 grams (4,000 milligrams) total of acetaminophen in one day  · Take your medicine as directed  Contact your healthcare provider if you think your medicine is not helping or if you have side effects  Tell him or her if you are allergic to any medicine  Keep a list of the medicines, vitamins, and herbs you take  Include the amounts, and when and why you take them  Bring the list or the pill bottles to follow-up visits  Carry your medicine list with you in case of an emergency  Self-care:   · Elevate the area above the level of your heart  as often as you can  This will help decrease swelling and pain  Prop the area on pillows or blankets to keep it elevated comfortably  · Clean the area daily until the wound scabs over  Gently wash the area with soap and water  Pat dry  Use dressings as directed  · Place cool or warm, wet cloths on the area as directed  Use clean cloths and clean water  Leave it on the area until the cloth is room temperature  Pat the area dry with a clean, dry cloth  The cloths may help decrease pain  Prevent cellulitis:   · Do not scratch bug bites or areas of injury  You increase your risk for cellulitis by scratching these areas  · Do not share personal items, such as towels, clothing, and razors  · Clean exercise equipment  with germ-killing  before and after you use it  · Wash your hands often  Use soap and water  Wash your hands after you use the bathroom, change a child's diapers, or sneeze  Wash your hands before you prepare or eat food  Use lotion to prevent dry, cracked skin  · Wear pressure stockings as directed  You may be told to wear the stockings if you have peripheral edema  The stockings improve blood flow and decrease swelling  · Treat athlete's foot  This can help prevent the spread of a bacterial skin infection  Follow up with your healthcare provider within 3 days, or as directed:   Your healthcare provider will check if your cellulitis is getting better  You may need different medicine  Write down your questions so you remember to ask them during your visits  © Copyright [address] Information is for End User's use only and may not be sold, redistributed or otherwise used for commercial purposes  All illustrations and images included in CareNotes® are the copyrighted property of A D A M , Inc  or [facility]   The above information is an  only  It is not intended as medical advice for individual conditions or treatments  Talk to your doctor, nurse or pharmacist before following any medical regimen to see if it is safe and effective for you

## 2021-02-25 NOTE — PROGRESS NOTES
I called the patient to follow up  She notes her dizziness has improved slightly and may attribute it to not moving as quickly  I asked her to continue taking her time to avoid any falls  The patient reports her blood sugars are remaining in the 200's  She states she continues to watch her diet  The patient was reminded to check her feet daily for any skin breakdown and to thoroughly dry her feet before putting on socks  She denies any skin changes at this time  The patient did not take the one time dose of kayexalate that was prescribed last week nor did she  her thyroid meds  She states she plans on obtaining them today  I educated her on taking her levothyroxine first thing in the morning, 30 minutes before she eats  The patient notes she has an appointment today for her finger  She reports she had a blister-like lesion on her finger for a few weeks which progressively worsened  She states it is now red and swollen  She does not know what may have precipitated this  I informed her that [place], CHW, plans on meeting up with the patient at her visit today and she was in agreement  She notes she has not yet received her SS card  I will continue to follow

## 2021-02-26 ENCOUNTER — PATIENT OUTREACH (OUTPATIENT)
Dept: FAMILY MEDICINE CLINIC | Facility: CLINIC | Age: 61
End: 2021-02-26

## 2021-02-26 NOTE — PROGRESS NOTES
Outgoing Calls:  2/26/2021    CHW did call Social Security Admin to inquire about status of pt's social security replacement card  which was ordered on 1/25/21 via mail  CHW was placed on hold for approximately 21 minutes and at this point CHW did add pt to the call via phone conference  CHW and pt were informed that pt will not be issued a new card unless she submits original proof of identity  Pt provided a copy of her ID and [employer] requires the original to be mailed to them  Pt is concerned by this as it is an out of state ID and does not want to risk it being lost in the mail  CHW did ask if pt could walk in or schedule an in person visit to complete this  We were informed that all SS offices are closed to the public due to [place]  Pt will need to wait until all offices are open to the public again  Currently they do not have a tentative date for re-opening to the public  CHW will not be able to assist pt in completing [employer] application since she needs to have copy of [place] state ID of which she does not have and is unable to get one until she gets a new social security card  CHW did offer to begin working on [employer]  Pt agreed to meet with CHW at [place] on 3/3/21 at 2:30PM, prior to her PCP appointment  Next outreach is scheduled for 3/3/2021

## 2021-03-02 ENCOUNTER — PATIENT OUTREACH (OUTPATIENT)
Dept: FAMILY MEDICINE CLINIC | Facility: CLINIC | Age: 61
End: 2021-03-02

## 2021-03-02 NOTE — PROGRESS NOTES
I called the patient to follow up on the ulcer on her finger   She states she continues taking her antibiotics and notes her finger looks "better"  The patient was reminded she has an appointment tomorrow to follow up on the finger and she plans on attending  The patient states she started taking her levothyroxine over the weekend and will be due for repeat TFT's ~4/10  I reminded her to take this medication in the morning about 30 minutes before she eats  Patient notes her blood sugars are "so-so" and are remaining in the 200's  I will continue educating on follow up calls

## 2021-03-03 ENCOUNTER — PATIENT OUTREACH (OUTPATIENT)
Dept: FAMILY MEDICINE CLINIC | Facility: CLINIC | Age: 61
End: 2021-03-03

## 2021-03-03 RX ORDER — SODIUM POLYSTYRENE SULFONATE 15 G/60ML
SUSPENSION ORAL; RECTAL
COMMUNITY
Start: 2021-02-18 | End: 2021-09-01 | Stop reason: HOSPADM

## 2021-03-03 NOTE — PROGRESS NOTES
Outgoing Call:  3/3/2021    CHW did call pt to f/u with her  Pt had previously agreed to meet with CHW at [facility] prior to her PCP appointment which was scheduled for today  Pt had cancelled her PCP appointment and rescheduled for 3/10/21  CHW did speak with pt and asked if she was well  Pt stated that she has not been feeling motivated and has been stuck in her bedroom all day  CHW did inform pt that she will mail her a [employer] packet so that she can review it and complete it if she feels comfortable doing this  CHW informed her that if she could not complete it on her own, CHW could assist in completing it  CHW did also inform pt that a copy of her birth certificate or statement of age letter from [employer] would be sufficient enough to apply for [employer]  services  Pt stated that she does not know if she has either of these documents but will look for them throughout the week and let CHW know if she finds them  CHW informed pt that she could call [employer] and request the statement of age letter in the event that she does not find it  Pt agreed she will do this if needed  Next outreach is scheduled for 3/8/21

## 2021-03-04 ENCOUNTER — PATIENT OUTREACH (OUTPATIENT)
Dept: FAMILY MEDICINE CLINIC | Facility: CLINIC | Age: 61
End: 2021-03-04

## 2021-03-04 NOTE — PROGRESS NOTES
CANDACE SNOW outreached to Pt by phone today to follow up  CANDACE SNOW received message of concern as Pt has expressed feelings of being "down" or unmotivated lately  Pt presented well by phone today  CANDACE Snow asked Pt how she was doing and she reported that she was doing well  Pt stated that she always struggles with depression and it is her baseline  CANDACE SNOW asked Pt if she has ever had therapy for her depression symptoms and Pt stated she had it a long time ago however at this time she is not interested  CANDACE SNOW expressed understanding  CANDACE SNOW asked Pt how she usually louann with her symptoms and she reported that she will talk with her daughter or call her friend from [place]  CANDACE SNOW provided emotional support through active listening and validation and Pt was thankful  CANDACE SNOW reminded Pt that if she was interested in therapy to let CANDACE SNOW know and educated that most clinics are still providing virtual services at this time  CANDACE SNOW encouraged Pt to reach out anytime she would like to talk or is feeling down and Pt expressed gratitude and understanding  CANDACE SNOW will continue to follow Pt

## 2021-03-08 ENCOUNTER — PATIENT OUTREACH (OUTPATIENT)
Dept: FAMILY MEDICINE CLINIC | Facility: CLINIC | Age: 61
End: 2021-03-08

## 2021-03-08 NOTE — PROGRESS NOTES
Outgoing Call:  3/8/2021    CHW did call pt to discuss [employer] and [employer] services  Pt stated that she did not receive the [employer] packet and that she has not located her birth certificate for [employer] application  CHW informed pt that if she did not receive it by her next PCP appointment on 3/10/21, CHW could meet wit her and complete at the practice  Pt agreed  Pt also stated that she did not find her [facility] but knows she has it somewhere in her home  She agreed to bring it with her on Wednesday to provide CHW with a copy for her [employer] application  Next outreach is scheduled for 3/10/21

## 2021-03-10 ENCOUNTER — OFFICE VISIT (OUTPATIENT)
Dept: FAMILY MEDICINE CLINIC | Facility: CLINIC | Age: 61
End: 2021-03-10

## 2021-03-10 ENCOUNTER — PATIENT OUTREACH (OUTPATIENT)
Dept: FAMILY MEDICINE CLINIC | Facility: CLINIC | Age: 61
End: 2021-03-10

## 2021-03-10 VITALS
RESPIRATION RATE: 18 BRPM | HEIGHT: 68 IN | BODY MASS INDEX: 39.57 KG/M2 | OXYGEN SATURATION: 98 % | SYSTOLIC BLOOD PRESSURE: 132 MMHG | HEART RATE: 82 BPM | TEMPERATURE: 96.5 F | WEIGHT: 261.1 LBS | DIASTOLIC BLOOD PRESSURE: 80 MMHG

## 2021-03-10 DIAGNOSIS — N18.32 STAGE 3B CHRONIC KIDNEY DISEASE (HCC): ICD-10-CM

## 2021-03-10 DIAGNOSIS — I25.10 CORONARY ARTERY DISEASE INVOLVING NATIVE HEART WITHOUT ANGINA PECTORIS, UNSPECIFIED VESSEL OR LESION TYPE: ICD-10-CM

## 2021-03-10 DIAGNOSIS — Z79.4 CURRENT USE OF INSULIN (HCC): ICD-10-CM

## 2021-03-10 DIAGNOSIS — L97.509 TYPE 2 DIABETES MELLITUS WITH FOOT ULCER, WITH LONG-TERM CURRENT USE OF INSULIN (HCC): Primary | ICD-10-CM

## 2021-03-10 DIAGNOSIS — S16.1XXA STRAIN OF NECK MUSCLE, INITIAL ENCOUNTER: ICD-10-CM

## 2021-03-10 DIAGNOSIS — L98.491 SKIN ULCER OF HAND, LIMITED TO BREAKDOWN OF SKIN (HCC): ICD-10-CM

## 2021-03-10 DIAGNOSIS — E11.42 TYPE 2 DIABETES MELLITUS WITH DIABETIC POLYNEUROPATHY, WITH LONG-TERM CURRENT USE OF INSULIN (HCC): ICD-10-CM

## 2021-03-10 DIAGNOSIS — M17.12 PRIMARY OSTEOARTHRITIS OF LEFT KNEE: ICD-10-CM

## 2021-03-10 DIAGNOSIS — F51.05 INSOMNIA SECONDARY TO ANXIETY: ICD-10-CM

## 2021-03-10 DIAGNOSIS — E11.621 TYPE 2 DIABETES MELLITUS WITH FOOT ULCER, WITH LONG-TERM CURRENT USE OF INSULIN (HCC): Primary | ICD-10-CM

## 2021-03-10 DIAGNOSIS — Z79.4 TYPE 2 DIABETES MELLITUS WITH DIABETIC POLYNEUROPATHY, WITH LONG-TERM CURRENT USE OF INSULIN (HCC): ICD-10-CM

## 2021-03-10 DIAGNOSIS — Z79.4 TYPE 2 DIABETES MELLITUS WITH FOOT ULCER, WITH LONG-TERM CURRENT USE OF INSULIN (HCC): Primary | ICD-10-CM

## 2021-03-10 DIAGNOSIS — F41.9 INSOMNIA SECONDARY TO ANXIETY: ICD-10-CM

## 2021-03-10 DIAGNOSIS — Z98.1 S/P CERVICAL SPINAL FUSION: ICD-10-CM

## 2021-03-10 LAB — SL AMB POCT HEMOGLOBIN AIC: 9.3 (ref ?–6.5)

## 2021-03-10 PROCEDURE — 83036 HEMOGLOBIN GLYCOSYLATED A1C: CPT | Performed by: NURSE PRACTITIONER

## 2021-03-10 PROCEDURE — 3046F HEMOGLOBIN A1C LEVEL >9.0%: CPT | Performed by: NURSE PRACTITIONER

## 2021-03-10 PROCEDURE — 3725F SCREEN DEPRESSION PERFORMED: CPT | Performed by: NURSE PRACTITIONER

## 2021-03-10 PROCEDURE — 4010F ACE/ARB THERAPY RXD/TAKEN: CPT | Performed by: NURSE PRACTITIONER

## 2021-03-10 PROCEDURE — 99214 OFFICE O/P EST MOD 30 MIN: CPT | Performed by: NURSE PRACTITIONER

## 2021-03-10 RX ORDER — PEN NEEDLE, DIABETIC 32 GX 1/4"
NEEDLE, DISPOSABLE MISCELLANEOUS 3 TIMES DAILY
Qty: 100 EACH | Refills: 5 | Status: SHIPPED | OUTPATIENT
Start: 2021-03-10 | End: 2021-12-14 | Stop reason: SDUPTHER

## 2021-03-10 RX ORDER — CLOPIDOGREL BISULFATE 75 MG/1
75 TABLET ORAL DAILY
Qty: 30 TABLET | Refills: 2 | Status: SHIPPED | OUTPATIENT
Start: 2021-03-10 | End: 2021-07-08

## 2021-03-10 RX ORDER — SYRING-NEEDL,DISP,INSUL,0.3 ML 31GX15/64"
SYRINGE, EMPTY DISPOSABLE MISCELLANEOUS 3 TIMES DAILY
Qty: 100 EACH | Refills: 2 | Status: SHIPPED | OUTPATIENT
Start: 2021-03-10 | End: 2022-03-29 | Stop reason: SDUPTHER

## 2021-03-10 RX ORDER — INSULIN LISPRO 100 [IU]/ML
15 INJECTION, SOLUTION INTRAVENOUS; SUBCUTANEOUS
Qty: 15 ML | Refills: 5 | Status: SHIPPED | OUTPATIENT
Start: 2021-03-10 | End: 2021-06-04

## 2021-03-10 RX ORDER — ATORVASTATIN CALCIUM 40 MG/1
40 TABLET, FILM COATED ORAL DAILY
Qty: 30 TABLET | Refills: 2 | Status: SHIPPED | OUTPATIENT
Start: 2021-03-10 | End: 2021-09-07

## 2021-03-10 RX ORDER — INSULIN GLARGINE 100 [IU]/ML
50 INJECTION, SOLUTION SUBCUTANEOUS 2 TIMES DAILY
Qty: 15 ML | Refills: 5 | Status: SHIPPED | OUTPATIENT
Start: 2021-03-10 | End: 2021-03-10

## 2021-03-10 RX ORDER — HYDROCODONE BITARTRATE AND ACETAMINOPHEN 5; 325 MG/1; MG/1
2 TABLET ORAL 2 TIMES DAILY PRN
Qty: 30 TABLET | Refills: 0 | Status: SHIPPED | OUTPATIENT
Start: 2021-03-10 | End: 2021-05-21 | Stop reason: SDUPTHER

## 2021-03-10 RX ORDER — NALOXONE HYDROCHLORIDE 4 MG/.1ML
SPRAY NASAL
Qty: 1 EACH | Refills: 1 | Status: SHIPPED | OUTPATIENT
Start: 2021-03-10

## 2021-03-10 RX ORDER — CARVEDILOL 6.25 MG/1
6.25 TABLET ORAL 2 TIMES DAILY WITH MEALS
Qty: 60 TABLET | Refills: 2 | Status: SHIPPED | OUTPATIENT
Start: 2021-03-10 | End: 2021-04-26 | Stop reason: SDUPTHER

## 2021-03-10 RX ORDER — CITALOPRAM 40 MG/1
40 TABLET ORAL DAILY
Qty: 30 TABLET | Refills: 2 | Status: SHIPPED | OUTPATIENT
Start: 2021-03-10 | End: 2021-06-14 | Stop reason: SDUPTHER

## 2021-03-10 RX ORDER — LANCETS 33 GAUGE
EACH MISCELLANEOUS
Qty: 100 EACH | Refills: 2 | Status: SHIPPED | OUTPATIENT
Start: 2021-03-10

## 2021-03-10 RX ORDER — LISINOPRIL 20 MG/1
20 TABLET ORAL DAILY
Qty: 30 TABLET | Refills: 6 | Status: SHIPPED | OUTPATIENT
Start: 2021-03-10 | End: 2021-06-04

## 2021-03-10 RX ORDER — ASPIRIN 81 MG/1
81 TABLET ORAL DAILY
Qty: 30 TABLET | Refills: 2 | Status: SHIPPED | OUTPATIENT
Start: 2021-03-10 | End: 2021-05-10

## 2021-03-10 RX ORDER — INSULIN GLARGINE 100 [IU]/ML
50 INJECTION, SOLUTION SUBCUTANEOUS 2 TIMES DAILY
Qty: 15 ML | Refills: 5 | Status: SHIPPED | OUTPATIENT
Start: 2021-03-10 | End: 2021-03-22

## 2021-03-10 RX ORDER — GABAPENTIN 600 MG/1
600 TABLET ORAL 3 TIMES DAILY
Qty: 90 TABLET | Refills: 2 | Status: SHIPPED | OUTPATIENT
Start: 2021-03-10 | End: 2021-03-17

## 2021-03-10 RX ORDER — OLANZAPINE 5 MG/1
5 TABLET ORAL
Qty: 30 TABLET | Refills: 2 | Status: SHIPPED | OUTPATIENT
Start: 2021-03-10 | End: 2021-06-07

## 2021-03-10 NOTE — ASSESSMENT & PLAN NOTE
A1C today is 9 3  3 point drop since 12/18/21  Lab Results   Component Value Date    HGBA1C 12 3 (H) 12/18/2020   -will keep same insulin regimen which was increased in September  -pt compliant with Trulicity - stay on 5 7ZJ weekly  -advised to continue to limit sugar/carb intake  -Endo visit on 4/1

## 2021-03-10 NOTE — PATIENT INSTRUCTIONS
Second-Degree Burn   WHAT YOU NEED TO KNOW:   A second-degree burn is also called a partial-thickness burn  A second-degree burn occurs when the first layer and some of the second layer of skin are burned  A superficial second-degree burn usually heals within 2 to 3 weeks with some scarring  A deep second-degree burn can take longer to heal  A second-degree burn can also get worse after a few days and become a third-degree burn  DISCHARGE INSTRUCTIONS:   Return to the emergency department if:   · You have a fast heartbeat or breathing  · You are not urinating  Call your doctor or burn specialist if:   · You have a fever  · You have increased redness, numbness, or swelling in the burn area  · Your wound or bandage is leaking pus and has a bad smell  · Your pain does not get better, or gets worse, even after you take pain medicine  · You have a dry mouth or eyes  · You are overly thirsty or tired  · You have dark yellow urine or urinate less than usual     · You have a headache or feel dizzy  · You have questions or concerns about your condition or care  Medicines:   · Medicines  may be given to decrease pain, prevent infection, or help your burn heal  They may be given as a pill or as an ointment applied to your skin  · Take your medicine as directed  Contact your healthcare provider if you think your medicine is not helping or if you have side effects  Tell him or her if you are allergic to any medicine  Keep a list of the medicines, vitamins, and herbs you take  Include the amounts, and when and why you take them  Bring the list or the pill bottles to follow-up visits  Carry your medicine list with you in case of an emergency  Burn care:   · Wash your hands with soap and water  Dry your hands with a clean towel or paper towel  · Remove old bandages  You may need to soak the bandage in water before you remove it so it will not stick to your wound      · Gently clean the burned area daily with mild soap and water  Pat the area dry  Look for any swelling or redness around the burn  Do not break closed blisters  You may cause a skin infection  · Apply cream or ointment to the burn with a cotton swab  Place a nonstick bandage over your burn  · Wrap a layer of gauze around the bandage to hold it in place  The wrap should be snug but not tight  It is too tight if you feel tingling or lose feeling in that area  · Apply gentle pressure for a few minutes if bleeding occurs  · Elevate your burned arm or leg above the level of your heart as often as you can  This will help decrease swelling and pain  Prop your burned arm or leg on pillows or blankets to keep it elevated comfortably  Self-care:   · Drink liquids as directed  You may need to drink extra liquid to help prevent dehydration  Ask how much liquid to drink each day and which liquids are best for you  · Go to physical therapy, if directed  Your muscles and joints may not work well after a second-degree burn  A physical therapist teaches you exercises to help improve movement and strength, and to decrease pain  Prevent second-degree burns:   · Do not leave cups, mugs, or bowls containing hot liquids at the edge of a table  Keep pot handles turned away from the stove front  · Do not leave a lit cigarette  Make sure it is no longer lit  Then dispose of it safely  · Store dangerous items out of the reach of children  Store cigarette lighters, matches, and chemicals where children cannot reach them  Use child safety latches on the door of the safe storage area  · Keep your water heater setting to low or medium  (90°F to 120°F, or 32°C to 48°C)  · Wear sunscreen that has a sun protectant factor (SPF) of 15 or higher  The sunscreen should also have ultraviolet A (UVA) and ultraviolet B (UVB) protection  Follow the directions on the label when you use sunscreen   Put on more sunscreen if you are in the sun for more than an hour  Reapply sunscreen often if you go swimming or are sweating  Follow up with your doctor or burn specialist as directed: You may need to return to have your wound checked and your bandage changed  Write down your questions so you remember to ask them during your visits  © Copyright [address] Information is for End User's use only and may not be sold, redistributed or otherwise used for commercial purposes  All illustrations and images included in CareNotes® are the copyrighted property of [employer]  or [facility] [employer]   The above information is an  only  It is not intended as medical advice for individual conditions or treatments  Talk to your doctor, nurse or pharmacist before following any medical regimen to see if it is safe and effective for you

## 2021-03-10 NOTE — ASSESSMENT & PLAN NOTE
F/u from two weeks ago  Healing, scabbed over (middle finger)  New burn on index and medial fourth (ring) finger  Index finger - scabbed over, no SOI  Ring finger - blister, open, no SOI  Suspect delayed healing due to diabetes  Wound care consult pending     -Silver cream and bandaid applied to open blistered fourth digit (ring finger)  -Appt with optometry to recheck glasses, may need update as she keeps burning herself on objects right in front of her

## 2021-03-10 NOTE — PROGRESS NOTES
Assessment/Plan:    Problem List Items Addressed This Visit        Endocrine    Type 2 diabetes mellitus, with long-term current use of insulin ([facility] ) - Primary     A1C today is 9 3  3 point drop since 12/18/21  Lab Results   Component Value Date    HGBA1C 12 3 (H) 12/18/2020   -will keep same insulin regimen which was increased in September  -pt compliant with Trulicity - stay on 6 0WJ weekly  -advised to continue to limit sugar/carb intake  -Endo visit on 4/1         Relevant Medications    aspirin (ECOTRIN LOW STRENGTH) 81 mg EC tablet    atorvastatin (LIPITOR) 40 mg tablet    carvedilol (COREG) 6 25 mg tablet    citalopram (CeleXA) 40 mg tablet    clopidogrel (PLAVIX) 75 mg tablet    Dulaglutide 1 5 MG/0 5ML SOPN    insulin lispro (HumaLOG KwikPen) 100 units/mL injection pen    Insulin Pen Needle (BD Pen Needle Micro U/F) 32G X 6 MM MISC    Insulin Syringe-Needle U-100 (BD Veo Insulin Syringe U/F) 31G X 15/64" 0 5 ML MISC    Lancets (OneTouch Delica Plus [id number]) MISC    lisinopril (ZESTRIL) 20 mg tablet    Other Relevant Orders    POCT hemoglobin A1c (Completed)       Cardiovascular and Mediastinum    CAD (coronary artery disease)    Relevant Medications    aspirin (ECOTRIN LOW STRENGTH) 81 mg EC tablet    atorvastatin (LIPITOR) 40 mg tablet    carvedilol (COREG) 6 25 mg tablet    citalopram (CeleXA) 40 mg tablet    clopidogrel (PLAVIX) 75 mg tablet    Lancets (OneTouch Delica Plus [id number]) MISC    lisinopril (ZESTRIL) 20 mg tablet       Musculoskeletal and Integument    Osteoarthritis of left knee    Relevant Medications    naloxone (NARCAN) 4 mg/0 1 mL nasal spray    HYDROcodone-acetaminophen (NORCO) 5-325 mg per tablet    Skin ulcer of hand, limited to breakdown of skin ([facility] )     F/u from two weeks ago  Healing, scabbed over (middle finger)  New burn on index and medial fourth (ring) finger  Index finger - scabbed over, no SOI  Ring finger - blister, open, no SOI    Suspect delayed healing due to diabetes  Wound care consult pending     -Silver cream and bandaid applied to open blistered fourth digit (ring finger)  -Appt with optometry to recheck glasses, may need update as she keeps burning herself on objects right in front of her  Relevant Medications    Diclofenac Sodium (VOLTAREN) 1 %    silver sulfadiazine (SILVADENE,SSD) 1 % cream    Other Relevant Orders    Ambulatory referral to Wound Care       Genitourinary    CKD (chronic kidney disease) stage 3, GFR 30-59 ml/min    Relevant Medications    aspirin (ECOTRIN LOW STRENGTH) 81 mg EC tablet    atorvastatin (LIPITOR) 40 mg tablet    carvedilol (COREG) 6 25 mg tablet    citalopram (CeleXA) 40 mg tablet    clopidogrel (PLAVIX) 75 mg tablet    Lancets (OneTouch Delica Plus [id number]) MISC    lisinopril (ZESTRIL) 20 mg tablet       Other    S/P cervical spinal fusion    Relevant Medications    gabapentin (NEURONTIN) 600 MG tablet    HYDROcodone-acetaminophen (NORCO) 5-325 mg per tablet      Other Visit Diagnoses     Current use of insulin (HCC)        Relevant Medications    aspirin (ECOTRIN LOW STRENGTH) 81 mg EC tablet    atorvastatin (LIPITOR) 40 mg tablet    carvedilol (COREG) 6 25 mg tablet    citalopram (CeleXA) 40 mg tablet    clopidogrel (PLAVIX) 75 mg tablet    Lancets (OneTouch Delica Plus [id number]) MISC    lisinopril (ZESTRIL) 20 mg tablet    Strain of neck muscle, initial encounter        Relevant Medications    Diclofenac Sodium (VOLTAREN) 1 %    Insomnia secondary to anxiety        Relevant Medications    citalopram (CeleXA) 40 mg tablet    OLANZapine (ZyPREXA) 5 mg tablet            Return for Next scheduled follow up        Subjective:     HPI: Lc Sanderson is a 64 y o  female who  has a past medical history of Abnormal liver function, Anemia, Anxiety, Arthritis, Chronic kidney disease, Chronic narcotic dependence ([address] ), Chronic pain disorder, Coronary artery disease, Depression, Diabetes mellitus ([address] ), GERD (gastroesophageal reflux disease), Heart murmur, Hyperlipidemia, Hypertension, Open toe wound, Renal disorder, Shortness of breath, and Sleep apnea  who presented to the office today for   A follow-up on a burn she received under middle finger 2 weeks ago  She was seen in office for this problem and referred to wound care advised to keep it clean and dry  Wound has been healing however delayed likely due to her diabetes  She now states she incurred to additional burns, 1 on the add Arturo and index finger and another burn on the adjacent ring finger  She states these were from a hot plate  She has what appears to be very old glasses however she does have an eye doctor's appointment in 3 weeks to update her prescription and possibly obtain new glasses  There are no signs of infection on these new wounds when she states they are not painful  Other than the wounds Sheryle Handing has no other new health concerns or questions but would like medication refills for most of her prescriptions  Her Norco was refilled in January and she states she has been able to make this last up until yesterday and she requests another refill  She has a history of opioid pain medication for chronic pain and has been titrated down from 10 mg to 5 mg by PCP  PDMP was reviewed  She will follow up with PCP at scheduled appointment next week      The following portions of the patient's history were reviewed and updated as appropriate: allergies, current medications, past family history, past medical history, past social history, past surgical history, and problem list     Current Outpatient Medications on File Prior to Visit   Medication Sig Dispense Refill    allopurinol (ZYLOPRIM) 300 mg tablet Take 300 mg by mouth daily      diphenhydrAMINE (BENADRYL) 25 mg capsule Take 25 mg by mouth every 4 (four) hours as needed for allergies or sleep       glucose blood (OneTouch Ultra) test strip Use 1 each daily Use as instructed 100 each 2    hydrochlorothiazide (MICROZIDE) 12 5 mg capsule Take 1 capsule (12 5 mg total) by mouth daily 30 capsule 2    levothyroxine 50 mcg tablet Take 1 tablet (50 mcg total) by mouth daily 60 tablet 0    nitroglycerin (NITROSTAT) 0 4 mg SL tablet Place 1 tablet (0 4 mg total) under the tongue every 5 (five) minutes as needed for chest pain 25 tablet 1    povidone-iodine 10 % Apply topically once as needed for wound care for up to 1 dose 100 each 0    [DISCONTINUED] aspirin (ECOTRIN LOW STRENGTH) 81 mg EC tablet TAKE 1 TABLET (81 MG TOTAL) BY MOUTH ONCE FOR 1 DOSE 30 tablet 2    [DISCONTINUED] atorvastatin (LIPITOR) 40 mg tablet Take 1 tablet (40 mg total) by mouth daily 30 tablet 2    [DISCONTINUED] carvedilol (COREG) 6 25 mg tablet Take 1 tablet (6 25 mg total) by mouth 2 (two) times a day with meals 60 tablet 2    [DISCONTINUED] citalopram (CeleXA) 40 mg tablet TAKE 1 TABLET BY MOUTH EVERY DAY 30 tablet 2    [DISCONTINUED] clopidogrel (PLAVIX) 75 mg tablet TAKE 1 TABLET BY MOUTH EVERY DAY 30 tablet 2    [DISCONTINUED] Diclofenac Sodium (VOLTAREN) 1 % Apply 2 g topically 4 (four) times a day 100 g 1    [DISCONTINUED] Dulaglutide 1 5 MG/0 5ML SOPN Inject 0 5 mL (1 5 mg total) under the skin once a week 4 pen 3    [DISCONTINUED] gabapentin (NEURONTIN) 600 MG tablet Take 1 tablet (600 mg total) by mouth 3 (three) times a day 90 tablet 2    [DISCONTINUED] HYDROcodone-acetaminophen (NORCO) 5-325 mg per tablet Take 2 tablets by mouth 2 (two) times a day as needed for painMax Daily Amount: 4 tablets 120 tablet 0    [DISCONTINUED] insulin glargine (Basaglar KwikPen) 100 units/mL injection pen Inject 50 Units under the skin 2 (two) times a day 10 pen 1    [DISCONTINUED] insulin lispro (HumaLOG KwikPen) 100 units/mL injection pen Inject 15 Units under the skin 3 (three) times a day with meals 5 pen 1    [DISCONTINUED] Insulin Pen Needle (BD Pen Needle Micro U/F) 32G X 6 MM MISC Use 3 (three) times a day 100 each 5    [DISCONTINUED] Insulin Syringe-Needle U-100 (BD Veo Insulin Syringe U/F) 31G X 15/64" 0 5 ML MISC Inject under the skin 3 (three) times a day 100 each 2    [DISCONTINUED] Lancets (OneTouch Delica Plus [id number]) MISC USE TO TEST 3 TIMES DAILY 100 each 2    [DISCONTINUED] lisinopril (ZESTRIL) 10 mg tablet Take 10 mg by mouth daily      [DISCONTINUED] lisinopril (ZESTRIL) 20 mg tablet Take 1 tablet (20 mg total) by mouth daily 30 tablet 6    [DISCONTINUED] naloxone (NARCAN) 4 mg/0 1 mL nasal spray Administer 1 spray into a nostril  If breathing does not return to normal or if breathing difficulty resumes after 2-3 minutes, give another dose in the other nostril using a new spray  1 each 1    [DISCONTINUED] OLANZapine (ZyPREXA) 5 mg tablet Take 5 mg by mouth daily at bedtime      lactulose (CHRONULAC) 10 g/15 mL solution Take 20 g by mouth daily at bedtime For elev Ammonia level      SPS 15 GM/60ML suspension        Current Facility-Administered Medications on File Prior to Visit   Medication Dose Route Frequency Provider Last Rate Last Admin    doxycycline hyclate (VIBRAMYCIN) capsule 100 mg  100 mg Oral Once Lorne Starks MD           Review of Systems   Constitutional: Positive for fatigue  Negative for fever and unexpected weight change  HENT: Negative for congestion, ear pain, rhinorrhea and sore throat  Eyes: Positive for visual disturbance  Respiratory: Negative for cough and shortness of breath  Cardiovascular: Positive for chest pain  Gastrointestinal: Positive for abdominal pain  Negative for blood in stool, constipation, diarrhea, nausea and vomiting  Endocrine: Negative  Genitourinary: Negative for dysuria and hematuria  Musculoskeletal: Positive for arthralgias, back pain, gait problem and myalgias  Negative for neck stiffness  Skin: Positive for wound  Negative for rash  Allergic/Immunologic: Negative  Neurological: Negative for dizziness, syncope, light-headedness and headaches  Hematological: Negative  Psychiatric/Behavioral: Negative  All other systems reviewed and are negative  Objective:    /80 (BP Location: Right arm, Patient Position: Sitting, Cuff Size: Standard)   Pulse 82   Temp (!) 96 5 °F (35 8 °C) (Temporal)   Resp 18   Ht 5' 8" (1 727 m)   Wt 118 kg (261 lb 1 6 oz)   SpO2 98%   Breastfeeding No   BMI 39 70 kg/m²     Physical Exam  Constitutional:       Appearance: She is obese  HENT:      Nose: Nose normal    Eyes:      Extraocular Movements: Extraocular movements intact  Pupils: Pupils are equal, round, and reactive to light  Cardiovascular:      Rate and Rhythm: Normal rate and regular rhythm  Heart sounds: Normal heart sounds  Pulmonary:      Effort: Pulmonary effort is normal       Breath sounds: Normal breath sounds  Musculoskeletal:         General: Tenderness present  Right ankle: She exhibits no swelling  Left ankle: She exhibits no swelling  Thoracic back: She exhibits pain  Lumbar back: She exhibits pain  Skin:     Findings: Wound present  Comments: Scabbed wounds lateral second and third left fingers (index and middle) No SOI  Open blister wound on fourth finger (ring), no SOI  Neurological:      Mental Status: She is alert  CHERELLE Hinkle  03/10/21  5:00 PM    Patient Instructions     Second-Degree Burn   WHAT YOU NEED TO KNOW:   A second-degree burn is also called a partial-thickness burn  A second-degree burn occurs when the first layer and some of the second layer of skin are burned  A superficial second-degree burn usually heals within 2 to 3 weeks with some scarring  A deep second-degree burn can take longer to heal  A second-degree burn can also get worse after a few days and become a third-degree burn  DISCHARGE INSTRUCTIONS:   Return to the emergency department if:   · You have a fast heartbeat or breathing  · You are not urinating      Call your doctor or burn specialist if:   · You have a fever  · You have increased redness, numbness, or swelling in the burn area  · Your wound or bandage is leaking pus and has a bad smell  · Your pain does not get better, or gets worse, even after you take pain medicine  · You have a dry mouth or eyes  · You are overly thirsty or tired  · You have dark yellow urine or urinate less than usual     · You have a headache or feel dizzy  · You have questions or concerns about your condition or care  Medicines:   · Medicines  may be given to decrease pain, prevent infection, or help your burn heal  They may be given as a pill or as an ointment applied to your skin  · Take your medicine as directed  Contact your healthcare provider if you think your medicine is not helping or if you have side effects  Tell him or her if you are allergic to any medicine  Keep a list of the medicines, vitamins, and herbs you take  Include the amounts, and when and why you take them  Bring the list or the pill bottles to follow-up visits  Carry your medicine list with you in case of an emergency  Burn care:   · Wash your hands with soap and water  Dry your hands with a clean towel or paper towel  · Remove old bandages  You may need to soak the bandage in water before you remove it so it will not stick to your wound  · Gently clean the burned area daily with mild soap and water  Pat the area dry  Look for any swelling or redness around the burn  Do not break closed blisters  You may cause a skin infection  · Apply cream or ointment to the burn with a cotton swab  Place a nonstick bandage over your burn  · Wrap a layer of gauze around the bandage to hold it in place  The wrap should be snug but not tight  It is too tight if you feel tingling or lose feeling in that area  · Apply gentle pressure for a few minutes if bleeding occurs      · Elevate your burned arm or leg above the level of your heart as often as you can   This will help decrease swelling and pain  Prop your burned arm or leg on pillows or blankets to keep it elevated comfortably  Self-care:   · Drink liquids as directed  You may need to drink extra liquid to help prevent dehydration  Ask how much liquid to drink each day and which liquids are best for you  · Go to physical therapy, if directed  Your muscles and joints may not work well after a second-degree burn  A physical therapist teaches you exercises to help improve movement and strength, and to decrease pain  Prevent second-degree burns:   · Do not leave cups, mugs, or bowls containing hot liquids at the edge of a table  Keep pot handles turned away from the stove front  · Do not leave a lit cigarette  Make sure it is no longer lit  Then dispose of it safely  · Store dangerous items out of the reach of children  Store cigarette lighters, matches, and chemicals where children cannot reach them  Use child safety latches on the door of the safe storage area  · Keep your water heater setting to low or medium  (90°F to 120°F, or 32°C to 48°C)  · Wear sunscreen that has a sun protectant factor (SPF) of 15 or higher  The sunscreen should also have ultraviolet A (UVA) and ultraviolet B (UVB) protection  Follow the directions on the label when you use sunscreen  Put on more sunscreen if you are in the sun for more than an hour  Reapply sunscreen often if you go swimming or are sweating  Follow up with your doctor or burn specialist as directed: You may need to return to have your wound checked and your bandage changed  Write down your questions so you remember to ask them during your visits  © Copyright [address] Information is for End User's use only and may not be sold, redistributed or otherwise used for commercial purposes   All illustrations and images included in CareNotes® are the copyrighted property of [employer]  or Yadira Malin  The above information is an  only  It is not intended as medical advice for individual conditions or treatments  Talk to your doctor, nurse or pharmacist before following any medical regimen to see if it is safe and effective for you  1) PCP Contacted on Admission: (Y/N) --> Name & Phone #: will need follow up with Dr. Rosado on discharge; contact in AM   2) Date of Contact with PCP:  3) PCP Contacted at Discharge: (Y/N)  4) Summary of Handoff Given to PCP:   5) Post-Discharge Appointment Date and Location:

## 2021-03-10 NOTE — PROGRESS NOTES
In Person:  3/10/2021    CHW did meet with pt while she was visiting with Dylan Pérez, [address]  Pt stated that she did forget to bring her birth certificate with her and apologized  CHW will need this to complete [employer] application  CHW informed her that she will call pt on 3/16/21 and remind her to bring it with her on her 3/17/21 appointment with Radha Rooney, [address]  Pt agreed she will bring it with her  Next outreach is scheduled for 3/16/2021

## 2021-03-11 ENCOUNTER — PATIENT OUTREACH (OUTPATIENT)
Dept: FAMILY MEDICINE CLINIC | Facility: CLINIC | Age: 61
End: 2021-03-11

## 2021-03-11 NOTE — PROGRESS NOTES
I called the patient to follow up and congratulate her on reducing her A1C 3 points from 12 3 to 9 3  The patient reported she also lost 20 pounds  I asked her to keep up the good work and stay on track  She notes her dizziness has decreased slightly but she is still afraid to go anywhere alone and going up and down stairs  She continues using her cane  The patient notes the ulcer on her finger has healed but she burned herself again on 2 other fingers  She states she has neuropathy and does not realize she burns herself until later in the day when she sees her fingers swollen  She notes she is trying to be extra alert when handling hot foods, plates, etc       Patient reports her fasting blood sugars have greatly improved  She notes today's reading was 154 and others this week were 114 and 117  She has made some changes to her diet and trying to adhere to restrictions  She is happy with the results and hopefully this will encourage her to continue on this path  Patient has a PCP appointment next week which she is aware  She is also aware she will be meeting with KATE Toro, on that day  The patient states she needs her toenails trimmed again  I advised her when she makes her follow up appointment with her PCP, to schedule it on a Monday when Podiatry is there and she states she will  I will continue to follow

## 2021-03-16 ENCOUNTER — PATIENT OUTREACH (OUTPATIENT)
Dept: FAMILY MEDICINE CLINIC | Facility: CLINIC | Age: 61
End: 2021-03-16

## 2021-03-16 NOTE — PROGRESS NOTES
Outgoing Call:  3/16/2021    CHW did call pt to remind her to bring her [facility] with her tomorrow when she visits with her PCP at [address] [place] [address] stated that she still has not located her [facility] and will have her daughter look for it today  Pt will need this in order to complete a [employer] application  Next outreach is scheduled for 3/17/2021  CHW will meet with pt at 3:30, prior to pt seeing PCP

## 2021-03-17 ENCOUNTER — OFFICE VISIT (OUTPATIENT)
Dept: FAMILY MEDICINE CLINIC | Facility: CLINIC | Age: 61
End: 2021-03-17

## 2021-03-17 ENCOUNTER — PATIENT OUTREACH (OUTPATIENT)
Dept: FAMILY MEDICINE CLINIC | Facility: CLINIC | Age: 61
End: 2021-03-17

## 2021-03-17 VITALS
HEIGHT: 68 IN | WEIGHT: 283 LBS | DIASTOLIC BLOOD PRESSURE: 80 MMHG | OXYGEN SATURATION: 95 % | BODY MASS INDEX: 42.89 KG/M2 | TEMPERATURE: 97.8 F | SYSTOLIC BLOOD PRESSURE: 140 MMHG | RESPIRATION RATE: 18 BRPM | HEART RATE: 93 BPM

## 2021-03-17 DIAGNOSIS — Z98.1 S/P CERVICAL SPINAL FUSION: ICD-10-CM

## 2021-03-17 DIAGNOSIS — E11.42 TYPE 2 DIABETES MELLITUS WITH DIABETIC POLYNEUROPATHY, WITH LONG-TERM CURRENT USE OF INSULIN (HCC): Primary | ICD-10-CM

## 2021-03-17 DIAGNOSIS — Z79.4 TYPE 2 DIABETES MELLITUS WITH DIABETIC POLYNEUROPATHY, WITH LONG-TERM CURRENT USE OF INSULIN (HCC): Primary | ICD-10-CM

## 2021-03-17 DIAGNOSIS — L03.011 CELLULITIS OF FINGER OF RIGHT HAND: ICD-10-CM

## 2021-03-17 DIAGNOSIS — R39.198 DIFFICULTY URINATING: ICD-10-CM

## 2021-03-17 LAB — SL AMB POCT GLUCOSE BLD: 96

## 2021-03-17 PROCEDURE — 1036F TOBACCO NON-USER: CPT | Performed by: NURSE PRACTITIONER

## 2021-03-17 PROCEDURE — 82948 REAGENT STRIP/BLOOD GLUCOSE: CPT | Performed by: NURSE PRACTITIONER

## 2021-03-17 PROCEDURE — 99214 OFFICE O/P EST MOD 30 MIN: CPT | Performed by: NURSE PRACTITIONER

## 2021-03-17 PROCEDURE — 3008F BODY MASS INDEX DOCD: CPT | Performed by: NURSE PRACTITIONER

## 2021-03-17 RX ORDER — GABAPENTIN 600 MG/1
600 TABLET ORAL 4 TIMES DAILY
Qty: 120 TABLET | Refills: 2 | Status: SHIPPED | OUTPATIENT
Start: 2021-03-17 | End: 2021-07-11

## 2021-03-17 RX ORDER — CEPHALEXIN 500 MG/1
500 CAPSULE ORAL EVERY 6 HOURS SCHEDULED
Qty: 28 CAPSULE | Refills: 0 | Status: SHIPPED | OUTPATIENT
Start: 2021-03-17 | End: 2021-03-24
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
## 2021-03-17 NOTE — PROGRESS NOTES
Incoming Call / In Person:  3/17/2021    CHW did meet with pt at [facility] while she was waiting to be seen by her PCP  CHW had received a call from pt earlier in the day stating that she did not find her BC  CHW informed pt that they could take time to call [employer] together and request a statement of age letter  Pt agreed we can work on this together tomorrow  Pt asked CHW if she knew if her daughter could claim her on her taxes  CHW offered to provide her with information from the IRS website and local IRS office  Pt also asked about how she could go about ordering a new BC  CHW offered to print out a [facility] application for pt  CHW did provide pt with a BC application, information to local IRS office and their website  Pt was appreciative of this  Pt also asked CHW if her daughter could work to care for her at home and be paid for it  CHW asked pt if she was interested or seeking waiver services  Pt stated that she has applied for the waiver sometime ago and was approved but has never received the services  CHW agreed to look into this with pt tomorrow  If pt has been approved for the waiver it is possible that her daughter could become her paid caretaker  Next outreach is scheduled for 3/18/2021

## 2021-03-17 NOTE — PATIENT INSTRUCTIONS
Call to schedule cardiology appointment tomorrow         Heart Healthy Diet   WHAT YOU NEED TO KNOW:   A heart healthy diet is an eating plan low in unhealthy fats and sodium (salt)  The plan is high in healthy fats and fiber  A heart healthy diet helps improve your cholesterol levels and lowers your risk for heart disease and stroke  A dietitian will teach you how to read and understand food labels  DISCHARGE INSTRUCTIONS:   Heart healthy diet guidelines to follow:   · Choose foods that contain healthy fats  ? Unsaturated fats  include monounsaturated and polyunsaturated fats  Unsaturated fat is found in foods such as soybean, canola, olive, corn, and safflower oils  It is also found in soft tub margarine that is made with liquid vegetable oil  ? Omega-3 fat  is found in certain fish, such as salmon, tuna, and trout, and in walnuts and flaxseed  Eat fish high in omega-3 fats at least 2 times a week  · Get 20 to 30 grams of fiber each day  Fruits, vegetables, whole-grain foods, and legumes (cooked beans) are good sources of fiber  · Limit or do not have unhealthy fats  ? Cholesterol  is found in animal foods, such as eggs and lobster, and in dairy products made from whole milk  Limit cholesterol to less than 200 mg each day  ? Saturated fat  is found in meats, such as monique and hamburger  It is also found in chicken or turkey skin, whole milk, and butter  Limit saturated fat to less than 7% of your total daily calories  ? Trans fat  is found in packaged foods, such as potato chips and cookies  It is also in hard margarine, some fried foods, and shortening  Do not eat foods that contain trans fats  · Limit sodium as directed  You may be told to limit sodium to 2,000 to 2,300 mg each day  Choose low-sodium or no-salt-added foods  Add little or no salt to food you prepare  Use herbs and spices in place of salt         Include the following in your heart healthy plan:  Ask your dietitian or healthcare provider how many servings to have from each of the following food groups:  · Grains:      ? Whole-wheat breads, cereals, and pastas, and brown rice    ? Low-fat, low-sodium crackers and chips    · Vegetables:      ? Broccoli, green beans, green peas, and spinach    ? Collards, kale, and lima beans    ? Carrots, sweet potatoes, tomatoes, and peppers    ? Canned vegetables with no salt added    · Fruits:      ? Bananas, peaches, pears, and pineapple    ? Grapes, raisins, and dates    ? Oranges, tangerines, grapefruit, orange juice, and grapefruit juice    ? Apricots, mangoes, melons, and papaya    ? Raspberries and strawberries    ? Canned fruit with no added sugar    · Low-fat dairy:      ? Nonfat (skim) milk, 1% milk, and low-fat almond, cashew, or soy milks fortified with calcium    ? Low-fat cheese, regular or frozen yogurt, and cottage cheese    · Meats and proteins:      ? Lean cuts of beef and pork (loin, leg, round), skinless chicken and turkey    ? Legumes, soy products, egg whites, or nuts    Limit or do not include the following in your heart healthy plan:   · Unhealthy fats and oils:      ? Whole or 2% milk, cream cheese, sour cream, or cheese    ? High-fat cuts of beef (T-bone steaks, ribs), chicken or turkey with skin, and organ meats such as liver    ? Butter, stick margarine, shortening, and cooking oils such as coconut or palm oil    · Foods and liquids high in sodium:      ? Packaged foods, such as frozen dinners, cookies, macaroni and cheese, and cereals with more than 300 mg of sodium per serving    ? Vegetables with added sodium, such as instant potatoes, vegetables with added sauces, or regular canned vegetables    ? Cured or smoked meats, such as hot dogs, monique, and sausage    ? High-sodium ketchup, barbecue sauce, salad dressing, pickles, olives, soy sauce, or miso    · Foods and liquids high in sugar:      ? Candy, cake, cookies, pies, or doughnuts    ?  Soft drinks (soda), sports drinks, or sweetened tea    ? Canned or dry mixes for cakes, soups, sauces, or gravies    Other healthy heart guidelines:   · Do not smoke  Nicotine and other chemicals in cigarettes and cigars can cause lung and heart damage  Ask your healthcare provider for information if you currently smoke and need help to quit  E-cigarettes or smokeless tobacco still contain nicotine  Talk to your healthcare provider before you use these products  · Limit or do not drink alcohol as directed  Alcohol can damage your heart and raise your blood pressure  Your healthcare provider may give you specific daily and weekly limits  The general recommended limit is 1 drink a day for women 21 or older and for men 72 or older  Do not have more than 3 drinks in a day or 7 in a week  The recommended limit is 2 drinks a day for men 24to 59years of age  Do not have more than 4 drinks in a day or 14 in a week  A drink of alcohol is 12 ounces of beer, 5 ounces of wine, or 1½ ounces of liquor  · Exercise regularly  Exercise can help you maintain a healthy weight and improve your blood pressure and cholesterol levels  Regular exercise can also decrease your risk for heart problems  Ask your healthcare provider about the best exercise plan for you  Do not start an exercise program without asking your healthcare provider  Follow up with your doctor or cardiologist as directed:  Write down your questions so you remember to ask them during your visits  © Copyright [address] Information is for End User's use only and may not be sold, redistributed or otherwise used for commercial purposes  All illustrations and images included in CareNotes® are the copyrighted property of A D A M , Inc  or [address] [place], [place] [address]   The above information is an  only  It is not intended as medical advice for individual conditions or treatments   Talk to your doctor, nurse or pharmacist before following any medical regimen to see if it is safe and effective for you

## 2021-03-18 ENCOUNTER — PATIENT OUTREACH (OUTPATIENT)
Dept: FAMILY MEDICINE CLINIC | Facility: CLINIC | Age: 61
End: 2021-03-18

## 2021-03-18 NOTE — ASSESSMENT & PLAN NOTE
Lab Results   Component Value Date    HGBA1C 9 3 (A) 03/10/2021     Current FBG around 100- 140 per patient   In office glucose 96   Continue current regimen Basaglar 50 units bid, Humalog 15 units tid, Trulicity 1 5 mg weekly

## 2021-03-18 NOTE — PROGRESS NOTES
Outgoing Call:  3/18/2021    CHW did call pt and did facilitate a conference call to [address] [place] [address]  CHW did inquire about waiver and if pt had previously been approved  Pt did give Mariano verbal consent for CHW to have full access to pt's waiver referral/account  CHW was informed that pt did begin the waiver application process but case was closed due to missing Physician's Certification form  CHW will forward this to her PCP  Pt was given an appointment for a re-assessment on 3/22/21 to apply for Community Based MA  Pt will receive a call from LANRE PEREIRA at [facility]      Pt was assessed by LANRE COSME on 8/22/20 and had her Functional Eligibility Assessment on 9/1/20  Next outreach is scheduled for 3/22/21

## 2021-03-18 NOTE — ASSESSMENT & PLAN NOTE
Blood pressure at goal 140/80   Continue with current regimen lisinopril 20 mg daily, hctz 12 5 mg daily, carvedilol 6 25 mg bid

## 2021-03-18 NOTE — ASSESSMENT & PLAN NOTE
Multiple stents placed in 2012 with revision in 2017  Currently taking ASA, plavix, statin   Following with cardiology   With episode of chest pain requiring ntg recommend she contact cardiology for follow up  In office EKG today unchanged from last done which showed Sinus rhythm with anteroseptal myocardial infarction age undetermined, 85 bpm   May need stress test

## 2021-03-19 ENCOUNTER — TELEPHONE (OUTPATIENT)
Dept: CARDIOLOGY CLINIC | Facility: CLINIC | Age: 61
End: 2021-03-19

## 2021-03-19 NOTE — TELEPHONE ENCOUNTER
Pt called fro return OV was given 4/26/21 but she has had 2 episodes of chest pain /discomfort once last week once this week occurring when just walking did take 1 nitro each time with relief--pain free today--described discomfort as a cramping (differnet than pain before prior stenting) Pt knows to go to ED if pain returns and not relieved by nitro  Did question her how her BP have been she states highest 140/80 and usually a bit lower  Please advise if any tests needed prior or needs sooner OV Thanks

## 2021-03-21 DIAGNOSIS — Z79.4 TYPE 2 DIABETES MELLITUS WITH DIABETIC POLYNEUROPATHY, WITH LONG-TERM CURRENT USE OF INSULIN (HCC): ICD-10-CM

## 2021-03-21 DIAGNOSIS — Z79.4 CURRENT USE OF INSULIN (HCC): ICD-10-CM

## 2021-03-21 DIAGNOSIS — I25.10 CORONARY ARTERY DISEASE INVOLVING NATIVE HEART WITHOUT ANGINA PECTORIS, UNSPECIFIED VESSEL OR LESION TYPE: ICD-10-CM

## 2021-03-21 DIAGNOSIS — N18.32 STAGE 3B CHRONIC KIDNEY DISEASE (HCC): ICD-10-CM

## 2021-03-21 DIAGNOSIS — E11.42 TYPE 2 DIABETES MELLITUS WITH DIABETIC POLYNEUROPATHY, WITH LONG-TERM CURRENT USE OF INSULIN (HCC): ICD-10-CM

## 2021-03-22 ENCOUNTER — PATIENT OUTREACH (OUTPATIENT)
Dept: FAMILY MEDICINE CLINIC | Facility: CLINIC | Age: 61
End: 2021-03-22

## 2021-03-22 DIAGNOSIS — L97.509 TYPE 2 DIABETES MELLITUS WITH FOOT ULCER, WITH LONG-TERM CURRENT USE OF INSULIN (HCC): Primary | ICD-10-CM

## 2021-03-22 DIAGNOSIS — E11.621 TYPE 2 DIABETES MELLITUS WITH FOOT ULCER, WITH LONG-TERM CURRENT USE OF INSULIN (HCC): Primary | ICD-10-CM

## 2021-03-22 DIAGNOSIS — Z79.4 TYPE 2 DIABETES MELLITUS WITH FOOT ULCER, WITH LONG-TERM CURRENT USE OF INSULIN (HCC): Primary | ICD-10-CM

## 2021-03-22 RX ORDER — INSULIN GLARGINE 100 [IU]/ML
50 INJECTION, SOLUTION SUBCUTANEOUS EVERY 12 HOURS SCHEDULED
Qty: 15 ML | Refills: 3 | Status: SHIPPED | OUTPATIENT
Start: 2021-03-22 | End: 2021-05-27

## 2021-03-22 RX ORDER — INSULIN GLARGINE 100 [IU]/ML
50 INJECTION, SOLUTION SUBCUTANEOUS 2 TIMES DAILY
Qty: 15 ML | Refills: 5 | Status: SHIPPED | OUTPATIENT
Start: 2021-03-22 | End: 2021-03-22

## 2021-03-22 NOTE — PROGRESS NOTES
Outgoing Call:  3/22/2021    CHW did call pt to discuss if she was able to complete her re-assessment today for the waiver program  Pt stated that she did complete it and was told that they will be in touch with her if they need any further information  CHW informed pt that she will call [employer] in three days for an update  Pt thanked CHW  CHW did ask pt if she has made any progress on getting a new BC and pt stated that she has been distracted but will start working on that again, this week  Pt needs her BC to complete [employer] application  Next outreach is scheduled for 3/25/2021

## 2021-03-24 ENCOUNTER — TELEPHONE (OUTPATIENT)
Dept: FAMILY MEDICINE CLINIC | Facility: CLINIC | Age: 61
End: 2021-03-24

## 2021-03-25 ENCOUNTER — OFFICE VISIT (OUTPATIENT)
Dept: WOUND CARE | Facility: HOSPITAL | Age: 61
End: 2021-03-25
Payer: COMMERCIAL

## 2021-03-25 ENCOUNTER — PATIENT OUTREACH (OUTPATIENT)
Dept: FAMILY MEDICINE CLINIC | Facility: CLINIC | Age: 61
End: 2021-03-25

## 2021-03-25 VITALS
HEART RATE: 88 BPM | DIASTOLIC BLOOD PRESSURE: 82 MMHG | TEMPERATURE: 98.9 F | SYSTOLIC BLOOD PRESSURE: 148 MMHG | RESPIRATION RATE: 18 BRPM

## 2021-03-25 DIAGNOSIS — Z79.4 TYPE 2 DIABETES MELLITUS WITH OTHER SKIN ULCER, WITH LONG-TERM CURRENT USE OF INSULIN (HCC): Primary | ICD-10-CM

## 2021-03-25 DIAGNOSIS — E11.622 TYPE 2 DIABETES MELLITUS WITH OTHER SKIN ULCER, WITH LONG-TERM CURRENT USE OF INSULIN (HCC): Primary | ICD-10-CM

## 2021-03-25 DIAGNOSIS — E11.42 TYPE 2 DIABETES MELLITUS WITH POLYNEUROPATHY (HCC): ICD-10-CM

## 2021-03-25 DIAGNOSIS — L98.491 SKIN ULCER OF HAND, LIMITED TO BREAKDOWN OF SKIN (HCC): ICD-10-CM

## 2021-03-25 PROCEDURE — 97597 DBRDMT OPN WND 1ST 20 CM/<: CPT | Performed by: FAMILY MEDICINE

## 2021-03-25 PROCEDURE — 99204 OFFICE O/P NEW MOD 45 MIN: CPT | Performed by: FAMILY MEDICINE

## 2021-03-25 PROCEDURE — 99213 OFFICE O/P EST LOW 20 MIN: CPT | Performed by: FAMILY MEDICINE

## 2021-03-25 RX ORDER — LIDOCAINE 40 MG/G
CREAM TOPICAL ONCE
Status: COMPLETED | OUTPATIENT
Start: 2021-03-25 | End: 2021-03-25

## 2021-03-25 RX ADMIN — LIDOCAINE 1 APPLICATION: 40 CREAM TOPICAL at 14:21

## 2021-03-25 NOTE — PATIENT INSTRUCTIONS
Orders Placed This Encounter   Procedures    Wound cleansing and dressings     Wash your hands with soap and water  Remove old dressing, discard into plastic bag and place in trash  Cleanse the wound with mild soap and water prior to applying a clean dressing  Do not use tissue or cotton balls  Do not scrub the wound  Pat dry using gauze    Shower yes   Apply mupirocin to the burn of left 3rd finger wound and other blisters on fingers   Secure with bandaid or gauze with tape and wear the white cotton glove liners at night after applying ointment to skin   Change dressing daily    This treatment was performed at [facility]     Use moiturizer on your skin every day, especially after your shower with aquaphor  Try using oven mits or gloves, white cotton glove liners can be soft and not restricting when using anything that is hot in the kitchen         Follow up in 1 week     Standing Status:   Future     Standing Expiration Date:   3/25/2022

## 2021-03-26 ENCOUNTER — PATIENT OUTREACH (OUTPATIENT)
Dept: FAMILY MEDICINE CLINIC | Facility: CLINIC | Age: 61
End: 2021-03-26

## 2021-03-26 DIAGNOSIS — Z79.4 TYPE 2 DIABETES MELLITUS WITH DIABETIC POLYNEUROPATHY, WITH LONG-TERM CURRENT USE OF INSULIN (HCC): ICD-10-CM

## 2021-03-26 DIAGNOSIS — Z79.4 CURRENT USE OF INSULIN (HCC): ICD-10-CM

## 2021-03-26 DIAGNOSIS — N18.32 STAGE 3B CHRONIC KIDNEY DISEASE (HCC): ICD-10-CM

## 2021-03-26 DIAGNOSIS — I25.10 CORONARY ARTERY DISEASE INVOLVING NATIVE HEART WITHOUT ANGINA PECTORIS, UNSPECIFIED VESSEL OR LESION TYPE: ICD-10-CM

## 2021-03-26 DIAGNOSIS — E11.42 TYPE 2 DIABETES MELLITUS WITH DIABETIC POLYNEUROPATHY, WITH LONG-TERM CURRENT USE OF INSULIN (HCC): ICD-10-CM

## 2021-03-26 RX ORDER — BLOOD SUGAR DIAGNOSTIC
1 STRIP MISCELLANEOUS DAILY
Qty: 100 EACH | Refills: 2 | Status: SHIPPED | OUTPATIENT
Start: 2021-03-26 | End: 2021-12-16 | Stop reason: SDUPTHER

## 2021-03-26 NOTE — PROGRESS NOTES
I called the patient to follow up  She states she is feeling well  She notes the wounds on her fingers are healing  She is using the Bactroban as instructed and has a follow up with Wound Care 4/1  Patient reports her blood sugars are hovering around 200  She notes she has only a few test strips remaining and the pharmacy will not allow her a refill until April; will send note to PCP  The patient states she is checking her sugars 2-4 times a day, depending on how she feels  I reminded the patient she also has an Endo appointment after her Wound Care appointment on 4/1  She was aware and plans on attending  I will continue to follow

## 2021-04-01 ENCOUNTER — TELEMEDICINE (OUTPATIENT)
Dept: ENDOCRINOLOGY | Facility: CLINIC | Age: 61
End: 2021-04-01
Payer: COMMERCIAL

## 2021-04-01 ENCOUNTER — PATIENT OUTREACH (OUTPATIENT)
Dept: FAMILY MEDICINE CLINIC | Facility: CLINIC | Age: 61
End: 2021-04-01

## 2021-04-01 DIAGNOSIS — E03.9 ACQUIRED HYPOTHYROIDISM: ICD-10-CM

## 2021-04-01 DIAGNOSIS — E78.2 MIXED HYPERLIPIDEMIA: ICD-10-CM

## 2021-04-01 DIAGNOSIS — Z79.4 TYPE 2 DIABETES MELLITUS WITH STAGE 4 CHRONIC KIDNEY DISEASE, WITH LONG-TERM CURRENT USE OF INSULIN (HCC): ICD-10-CM

## 2021-04-01 DIAGNOSIS — Z79.4 TYPE 2 DIABETES MELLITUS WITH HYPERGLYCEMIA, WITH LONG-TERM CURRENT USE OF INSULIN (HCC): Primary | ICD-10-CM

## 2021-04-01 DIAGNOSIS — E11.65 TYPE 2 DIABETES MELLITUS WITH HYPERGLYCEMIA, WITH LONG-TERM CURRENT USE OF INSULIN (HCC): ICD-10-CM

## 2021-04-01 DIAGNOSIS — Z79.4 TYPE 2 DIABETES MELLITUS WITH DIABETIC POLYNEUROPATHY, WITH LONG-TERM CURRENT USE OF INSULIN (HCC): ICD-10-CM

## 2021-04-01 DIAGNOSIS — E11.42 TYPE 2 DIABETES MELLITUS WITH DIABETIC POLYNEUROPATHY, WITH LONG-TERM CURRENT USE OF INSULIN (HCC): ICD-10-CM

## 2021-04-01 DIAGNOSIS — I10 ESSENTIAL HYPERTENSION: ICD-10-CM

## 2021-04-01 DIAGNOSIS — N18.4 TYPE 2 DIABETES MELLITUS WITH STAGE 4 CHRONIC KIDNEY DISEASE, WITH LONG-TERM CURRENT USE OF INSULIN (HCC): ICD-10-CM

## 2021-04-01 DIAGNOSIS — E11.22 TYPE 2 DIABETES MELLITUS WITH STAGE 4 CHRONIC KIDNEY DISEASE, WITH LONG-TERM CURRENT USE OF INSULIN (HCC): ICD-10-CM

## 2021-04-01 DIAGNOSIS — Z79.4 TYPE 2 DIABETES MELLITUS WITH HYPERGLYCEMIA, WITH LONG-TERM CURRENT USE OF INSULIN (HCC): ICD-10-CM

## 2021-04-01 DIAGNOSIS — E11.65 TYPE 2 DIABETES MELLITUS WITH HYPERGLYCEMIA, WITH LONG-TERM CURRENT USE OF INSULIN (HCC): Primary | ICD-10-CM

## 2021-04-01 PROBLEM — E11.9 TYPE 2 DIABETES MELLITUS, WITH LONG-TERM CURRENT USE OF INSULIN (HCC): Status: RESOLVED | Noted: 2020-09-02 | Resolved: 2021-04-01

## 2021-04-01 PROCEDURE — 99204 OFFICE O/P NEW MOD 45 MIN: CPT | Performed by: INTERNAL MEDICINE

## 2021-04-01 PROCEDURE — 3066F NEPHROPATHY DOC TX: CPT | Performed by: NURSE PRACTITIONER

## 2021-04-01 RX ORDER — FLASH GLUCOSE SENSOR
KIT MISCELLANEOUS
Qty: 2 EACH | Refills: 3 | Status: SHIPPED | OUTPATIENT
Start: 2021-04-01 | End: 2022-03-29

## 2021-04-01 RX ORDER — FLASH GLUCOSE SCANNING READER
EACH MISCELLANEOUS
Qty: 1 EACH | Refills: 0 | Status: SHIPPED | OUTPATIENT
Start: 2021-04-01 | End: 2022-03-29

## 2021-04-01 RX ORDER — FLASH GLUCOSE SENSOR
KIT MISCELLANEOUS
Qty: 2 EACH | Refills: 3 | Status: SHIPPED | OUTPATIENT
Start: 2021-04-01 | End: 2021-04-01

## 2021-04-01 NOTE — PROGRESS NOTES
Outgoing Call:  4/1/2021    CHW did call pt to discuss status of waiver and BC  Pt stated that she did receive her new BC in the mail and will submit a copy to CHW via email  CHW will need this to forward to [facility] for ride share services  CHW did ask pt if she received any calls from [facility] in reference to waiver  Pt stated that she did and forgot to call CHW  They are requesting copies of all bank statements from 2016 - current date  Pt will need to call her bank and request them  Pt agreed to do this  Pt is frustrated and stated that her health insurance is not covering all of her scripts and is considering changing her provider  CHW offered to assist  Pt stated that she appreciates that  Next outreach is scheduled for 4/2/2021, to begin seeking new health insurance provider

## 2021-04-01 NOTE — PATIENT INSTRUCTIONS
INSULIN DOSAGE INSTRUCTIONS    Name: Oscar Loomis                        : 1960  MRN #: [id number]    Your Current Insulin  and dose is: Before Breakfast Before Lunch Before Evening Meal Bedtime   Humalog    15 units      15 units    15 units     Regular, Apidra, Humalog orNovolog Sliding Scale:   <80              151-200 + 2 +2 +2    201-250 + 4 +4 +4 +   251-300 + 6 +6 +6 +   301-350 +8 +8 +8 +   >350 +10 +10 +10 +     Novolog 70/30 Insulin  Humalog Mix 75/25 Insulin  Novolin 70/30 Insulin                Lantus Insulin   50 units    50 units      Additional Instructions:   Please test your blood sugar:  _4_ Times per day  X_ Before Breakfast                _ Alternate Testing  X_ Before Lunch                _ 2 Hours After  Meal  X_ Before Evening Meal               _ 3 a m   x_ Before Bedtime Snack     Target Blood sugar range _70_to _140__  Call if your Jamee Ends  blood sugar is less than _60_ or greater than _400__  Today's Date: 2021      Hypoglycemia instructions   Oscar Loomis  2021  [id number]    Low Blood Sugar    Steps to treat low blood sugar  1  Test blood sugar if you have symptoms of low blood sugar:   Low Blood Sugar Symptoms:  o Sweaty  o Dizzy  o Rapid heartbeat  o Shaky    o Bad mood  o Hungry      2  Treat blood sugar less than 70 with 15 grams of fast-acting carbohydrate:   Examples of 15 grams Fast-Acting Carbohydrate:  o 4 oz juice  o 4 oz regular soda  o 3-4 glucose tablets (chew)  o 3-4 hard candies (chew)              3    Wait 15 minutes and test your blood sugar again           4   If blood sugar is less than 100, repeat steps 2-3       5  When your blood sugar is 100 or more, eat a snack if it will be longer than one hour until your next meal  The snack should be 15 grams of carbohydrate and a protein:   Examples of snacks:  o ½ sandwich  o 6 crackers with cheese  o Piece of fruit with cheese or peanut butter  o 6 crackers with peanut butter

## 2021-04-01 NOTE — PROGRESS NOTES
Virtual Regular Visit      Assessment/Plan:    Elidia Bates was seen today for virtual regular visit  Diagnoses and all orders for this visit:    Type 2 diabetes mellitus with hyperglycemia, with long-term current use of insulin ([address] )  Lab Results   Component Value Date    HGBA1C 9 3 (A) 03/10/2021   A1c is 9 3%   Blood sugars as per patient are in 150-250 range  Discussed about checking blood sugar before breakfast, before lunch and before dinner and keep log and send it in 1 week so we can review and adjust insulin dosing  Until then continue Lantus 50 units twice a day, Humalog 15 units with meals  I have also given her a sliding scale if blood sugar is above 150  Scale - 150-200 -2 units, 201-250-4 units, 251-300 -6 units, 301-350-8 units, > 350- 10 units   -will refer to nutrition therapy/diabetes education for carb consistency   -discussed hypoglycemia symptoms and treatment  -discussed importance of continues glucose monitor sensor in improving glycemic control as well as for fingerstick monitoring  Patient is agreeable to try continues glucose monitor sensor by [facility]   Discuss hypoglycemia symptoms and treatment  Discussed importance of follow-up with Ophthalmology and podiatry  -     Continuous Blood Gluc Sensor (FreeStyle [place] 14 Day Sensor) MISC; Use 1 sensor every 2 weeks  -     Continuous Blood Gluc  ([facility] [place]) YOUNG; Use as directed  -     Ambulatory referral to Diabetic Education; Future    Type 2 diabetes mellitus with diabetic polyneuropathy, with long-term current use of insulin ([address] )  -     Ambulatory referral to Endocrinology  -     Continuous Blood Gluc Sensor (FreeStyle [place] 14 Day Sensor) MISC; Use 1 sensor every 2 weeks  -     Continuous Blood Gluc  ([facility] [place]) YOUNG; Use as directed  -     Ambulatory referral to Diabetic Education; Future  -     Basic metabolic panel;  Future  -     Hemoglobin A1C; Future  -     Microalbumin / creatinine urine ratio; Future    Essential hypertension  As per patient blood pressure is usually controlled  Continue current management    Mixed hyperlipidemia  Continue statins  Goal for LDL is less than 100  Managed by PCP  -     Lipid panel; Future    Type 2 diabetes mellitus with stage 4 chronic kidney disease, with long-term current use of insulin ([facility])    Lab Results   Component Value Date    HGBA1C 9 3 (A) 03/10/2021   A1c is 9 3%  Educated about monitoring blood sugar 3-4 times daily and sending log to make further adjustment  Also discussed the goal for blood sugar is 80 to 160 mg/dL    Acquired hypothyroidism  Patient was recently started on levothyroxine 50 mcg daily  Educated to take levothyroxine on empty stomach 1 hour before breakfast   Continue levothyroxine 50 mcg daily for now  -     T4, free; Future  -     TSH, 3rd generation; Future  -     T4, free; Future  -     TSH, 3rd generation;  Future      Problem List Items Addressed This Visit        Endocrine    RESOLVED: Type 2 diabetes mellitus, with long-term current use of insulin ([facility])    Relevant Medications    Continuous Blood Gluc Sensor (FreeStyle [place] 14 Day Sensor) MISC    Continuous Blood Gluc  (FreeStyle [place] [address] [place]) YOUNG    Other Relevant Orders    Ambulatory referral to Diabetic Education    Ambulatory referral to Diabetic Education    Basic metabolic panel    Hemoglobin A1C    Microalbumin / creatinine urine ratio       Cardiovascular and Mediastinum    HTN (hypertension)      Other Visit Diagnoses     Type 2 diabetes mellitus with hyperglycemia, with long-term current use of insulin ([facility])    -  Primary    Relevant Medications    Continuous Blood Gluc Sensor (FreeStyle [place] 14 Day Sensor) MISC    Continuous Blood Gluc  (FreeStyle [place] [address] [place]) YOUNG    Other Relevant Orders    Ambulatory referral to Diabetic Education    Mixed hyperlipidemia        Relevant Orders    Lipid panel    Type 2 diabetes mellitus with stage 4 chronic kidney disease, with long-term current use of insulin (HCC)        Acquired hypothyroidism        Relevant Orders    T4, free    TSH, 3rd generation    T4, free    TSH, 3rd generation               Reason for visit is   Chief Complaint   Patient presents with    Virtual Regular Visit        Encounter provider Wilian Mcbride MD    Provider located at [address] [place] [id number]      Recent Visits  No visits were found meeting these conditions  Showing recent visits within past 7 days and meeting all other requirements     Today's Visits  Date Type Provider Dept   04/01/21 Telemedicine Wilian Mcbride MD [facility]   Showing today's visits and meeting all other requirements     Future Appointments  No visits were found meeting these conditions  Showing future appointments within next 150 days and meeting all other requirements        The patient was identified by name and date of birth  Jose Canada was informed that this is a telemedicine visit and that the visit is being conducted through [facility] and patient was informed that this is a secure, HIPAA-compliant platform  She agrees to proceed     My office door was closed  No one else was in the room  She acknowledged consent and understanding of privacy and security of the video platform  The patient has agreed to participate and understands they can discontinue the visit at any time  Patient is aware this is a billable service  Subjective  Jose Canada is a 64 y o  female  With medical HX of type 2 DM, for 30 yrs, and currently on insulin regimen for few years, hyperlipidemia and hypertension is here for evaluation of type 2 diabetes and management       Current insulin regimen-  Insulin lantus 50 units twice a day and humalog 15 units with meals +scale   She takes Trulicity 1 5 mg SQ once a week   She denies missing doses     She checks blood sugars 2-3 times daily , did not bring the log  With her   Fasting - 120- 180 mg/dl   During the day- 150-250 mg/dl     She denies hypoglycemia or hyperglycemia requiring ER admission or hospitalization     She feels symptoms of hypoglycemia , shakiness and dizziness       She takes Lipitor 20 mg   She takes Microzide , for hypertension     She was diagnosed with hypothyroidism, in Feb 2021, and was started on levothyroxine, she takes 50 mcg po daily 1 hour before breakfast      Reviewed blood work results   NA- 142, -in-6, chloride 109, bicarb 28, Na a, BUN 88 and creatinine 1 7, glucose 95 calcium 9 1, AST 29, ALT 11, alkaline phosphatase 107, albumin 3 5 GFR 31   Magnesium 2 0  Cholesterol 199, triglycerides 162, HDL 45,       Lab Results   Component Value Date    [id number] 6 030 (H) 02/17/2021       Lab Results   Component Value Date    HGBA1C 9 3 (A) 03/10/2021     No results found for: SMOOTHMUSCAB, MITOAB    Lab Results   Component Value Date    CREATININE 1 79 (H) 02/17/2021       Past Medical History:   Diagnosis Date    Abnormal liver function     Anemia     Anxiety     Arthritis     Chronic kidney disease     stage 3    Chronic narcotic dependence (HCC)     Chronic pain disorder     lower back, hands , neck and knees    Coronary artery disease     Depression     Diabetes mellitus ([address] )     GERD (gastroesophageal reflux disease)     no meds at present    Heart murmur     murmur    Hyperlipidemia     Hypertension     Open toe wound 12/2020    right big toe open calus but is dry at present    Renal disorder     Shortness of breath     exertion    Sleep apnea     doesn't use cpap       Past Surgical History:   Procedure Laterality Date    AMPUTATION      ANGIOPLASTY  2017    5    BREAST EXCISIONAL BIOPSY Left     BREAST SURGERY      CARDIAC CATHETERIZATION      CARPAL TUNNEL RELEASE Bilateral     CERVICAL FUSION      HYSTERECTOMY 2008    KNEE SURGERY      OOPHORECTOMY  2008    AZ EXC SKIN BENIG 3 1-4 CM REMAINDR BODY N/A 12/21/2020    Procedure: EXCISION SEBACEOUS CYST X 5 SCALP;  Surgeon: Jace King MD;  Location: [address] [place] [address] OR;  Service: General    TOE AMPUTATION Left     TRIGGER FINGER RELEASE Right     4th Finger       Current Outpatient Medications   Medication Sig Dispense Refill    allopurinol (ZYLOPRIM) 300 mg tablet Take 300 mg by mouth daily      aspirin (ECOTRIN LOW STRENGTH) 81 mg EC tablet Take 1 tablet (81 mg total) by mouth daily 30 tablet 2    atorvastatin (LIPITOR) 40 mg tablet Take 1 tablet (40 mg total) by mouth daily 30 tablet 2    carvedilol (COREG) 6 25 mg tablet Take 1 tablet (6 25 mg total) by mouth 2 (two) times a day with meals 60 tablet 2    citalopram (CeleXA) 40 mg tablet Take 1 tablet (40 mg total) by mouth daily 30 tablet 2    clopidogrel (PLAVIX) 75 mg tablet Take 1 tablet (75 mg total) by mouth daily 30 tablet 2    Continuous Blood Gluc  (FreeStyle [place] [address] [place]) YOUNG Use as directed 1 each 0    Continuous Blood Gluc Sensor (XunleiStyle Iraida 14 Day Sensor) MISC Use 1 sensor every 2 weeks 2 each 3    Diclofenac Sodium (VOLTAREN) 1 % Apply 2 g topically 4 (four) times a day 100 g 1    diphenhydrAMINE (BENADRYL) 25 mg capsule Take 25 mg by mouth every 4 (four) hours as needed for allergies or sleep       Dulaglutide 1 5 MG/0 5ML SOPN Inject 0 5 mL (1 5 mg total) under the skin once a week 4 pen 3    gabapentin (NEURONTIN) 600 MG tablet Take 1 tablet (600 mg total) by mouth 4 (four) times a day 120 tablet 2    glucose blood (OneTouch Ultra) test strip Use 1 each daily Use as instructed 100 each 2    hydrochlorothiazide (MICROZIDE) 12 5 mg capsule Take 1 capsule (12 5 mg total) by mouth daily 30 capsule 2    HYDROcodone-acetaminophen (NORCO) 5-325 mg per tablet Take 2 tablets by mouth 2 (two) times a day as needed for painMax Daily Amount: 4 tablets 30 tablet 0    insulin glargine (Lantus SoloStar) 100 units/mL injection pen Inject 50 Units under the skin every 12 (twelve) hours 15 mL 3    insulin lispro (HumaLOG KwikPen) 100 units/mL injection pen Inject 15 Units under the skin 3 (three) times a day with meals 15 mL 5    Insulin Pen Needle (BD Pen Needle Micro U/F) 32G X 6 MM MISC Use 3 (three) times a day 100 each 5    Insulin Syringe-Needle U-100 (BD Veo Insulin Syringe U/F) 31G X 15/64" 0 5 ML MISC Inject under the skin 3 (three) times a day 100 each 2    lactulose (CHRONULAC) 10 g/15 mL solution Take 20 g by mouth daily at bedtime For elev Ammonia level      Lancets (OneTouch Delica Plus GGHSDL89I) MISC USE TO TEST 3 TIMES DAILY 100 each 2    levothyroxine 50 mcg tablet Take 1 tablet (50 mcg total) by mouth daily 60 tablet 0    lisinopril (ZESTRIL) 20 mg tablet Take 1 tablet (20 mg total) by mouth daily 30 tablet 6    mupirocin (BACTROBAN) 2 % ointment Apply topically daily 22 g 0    naloxone (NARCAN) 4 mg/0 1 mL nasal spray Administer 1 spray into a nostril  If breathing does not return to normal or if breathing difficulty resumes after 2-3 minutes, give another dose in the other nostril using a new spray  1 each 1    nitroglycerin (NITROSTAT) 0 4 mg SL tablet Place 1 tablet (0 4 mg total) under the tongue every 5 (five) minutes as needed for chest pain 25 tablet 1    OLANZapine (ZyPREXA) 5 mg tablet Take 1 tablet (5 mg total) by mouth daily at bedtime 30 tablet 2    povidone-iodine 10 % Apply topically once as needed for wound care for up to 1 dose 100 each 0    silver sulfadiazine (SILVADENE,SSD) 1 % cream Apply topically daily To burns on fingers, cover w/band-aid  50 g 0    SPS 15 GM/60ML suspension        No current facility-administered medications for this visit        Facility-Administered Medications Ordered in Other Visits   Medication Dose Route Frequency Provider Last Rate Last Admin    doxycycline hyclate (VIBRAMYCIN) capsule 100 mg  100 mg Oral Once Sara GALLO MD [place]            Allergies   Allergen Reactions    Codeine      Other reaction(s): Nausea and Vomiting       Review of Systems   Constitutional: Negative  HENT: Negative  Respiratory: Negative  Cardiovascular: Negative  Gastrointestinal: Negative  Endocrine: Negative for cold intolerance, heat intolerance, polydipsia, polyphagia and polyuria  Genitourinary: Negative  Musculoskeletal: Positive for arthralgias, back pain and myalgias  Neurological: Positive for numbness  Hematological: Negative  Psychiatric/Behavioral: The patient is nervous/anxious  Video Exam    There were no vitals filed for this visit  Physical Exam  Constitutional:       General: She is not in acute distress  Appearance: Normal appearance  She is not ill-appearing  HENT:      Head: Normocephalic and atraumatic  Nose: Nose normal    Eyes:      Extraocular Movements: Extraocular movements intact  Conjunctiva/sclera: Conjunctivae normal    Neck:      Musculoskeletal: Normal range of motion  Pulmonary:      Effort: No respiratory distress  Neurological:      General: No focal deficit present  Mental Status: She is alert and oriented to person, place, and time  Psychiatric:         Mood and Affect: Mood normal          Behavior: Behavior normal           I spent 40 minutes directly with the patient during this visit      VIRTUAL VISIT DISCLAIMER    Enmanuel Smith acknowledges that she has consented to an online visit or consultation  She understands that the online visit is based solely on information provided by her, and that, in the absence of a face-to-face physical evaluation by the physician, the diagnosis she receives is both limited and provisional in terms of accuracy and completeness  This is not intended to replace a full medical face-to-face evaluation by the physician  Enmanuel Smith understands and accepts these terms

## 2021-04-02 ENCOUNTER — PATIENT OUTREACH (OUTPATIENT)
Dept: FAMILY MEDICINE CLINIC | Facility: CLINIC | Age: 61
End: 2021-04-02

## 2021-04-02 NOTE — PROGRESS NOTES
Outgoing Call:  4/2/2021    CHW did call pt to complete Medicare search for new insurance coverage  Pt not available  Next outreach is scheduled for 4/5/2021

## 2021-04-05 ENCOUNTER — PATIENT OUTREACH (OUTPATIENT)
Dept: FAMILY MEDICINE CLINIC | Facility: CLINIC | Age: 61
End: 2021-04-05

## 2021-04-05 NOTE — PROGRESS NOTES
Outgoing Calls:  4/5/2021    CHW did attempt to call Medicare to discuss a new prescription plan  After being placed on hold for 35 minutes call was disconnected  CHW did call pt and informed her that she should try calling early in the morning  CHW did provide Medicare number for pt  Pt agreed to try in the morning  CHW did discuss meeting with pt on 4/7/21 at [facility] when pt will be getting her COVID vaccine  CHW needs to scan a copy of pt's [facility] and get signatures for [employer] application  Pt agreed to call CHW as soon as she arrives at [facility]  Next outreach is scheduled for 4/7/2021

## 2021-04-06 ENCOUNTER — PATIENT OUTREACH (OUTPATIENT)
Dept: FAMILY MEDICINE CLINIC | Facility: CLINIC | Age: 61
End: 2021-04-06

## 2021-04-06 NOTE — PROGRESS NOTES
I called the patient to follow up  She states she is doing well but reports her blood sugar this morning was 296  She states she "owns it" and realizes it was her fault  She notes she placed food deliveries for Easter but they did not arrive on time which made her order again and ultimately received all the orders instead of just one  She states she will get back on track  She realizes she needs to keep her sugars under control because she has an eye exam on Friday, 4/9, and does not want to have any vision changes  The patient states she is trying to be more active but notes it is difficult because she needs something to hold onto and her neuropathy interferes with grabbing things  The patient states she tends to get depressed  I offered support and encouragement and asked her to take one day at a time  I reminded the patient she is due for lab work next week (recheck TFTs)  I provided her with the [email or web address] phone number because it is difficult for her to find transportation  She notes she will need to get an Lourdes Moss for her eye appointment on Friday  I will continue to follow

## 2021-04-07 ENCOUNTER — IMMUNIZATIONS (OUTPATIENT)
Dept: FAMILY MEDICINE CLINIC | Facility: HOSPITAL | Age: 61
End: 2021-04-07

## 2021-04-07 ENCOUNTER — PATIENT OUTREACH (OUTPATIENT)
Dept: FAMILY MEDICINE CLINIC | Facility: CLINIC | Age: 61
End: 2021-04-07

## 2021-04-07 DIAGNOSIS — Z23 ENCOUNTER FOR IMMUNIZATION: Primary | ICD-10-CM

## 2021-04-07 PROCEDURE — 91301 SARS-COV-2 / COVID-19 MRNA VACCINE (MODERNA) 100 MCG: CPT

## 2021-04-07 PROCEDURE — 0011A SARS-COV-2 / COVID-19 MRNA VACCINE (MODERNA) 100 MCG: CPT

## 2021-04-07 NOTE — PROGRESS NOTES
In Person / Leif Rehman Calls:  4/7/2021    CHW did meet with pt at [facility] after she received her COVID vaccine  CHW was able to collect signature from pt for [employer] application  Pt did also provide a copy of her [facility] and insurance cards for the application  CHW did complete application for pt  CHW did review a letter which pt did bring in with her from [address]  Pt is unsure of what she needs to provide [place] [employer] with to complete her waiver application  CHW did review letter with pt and informed pt that they are requesting 5 years worth of bank statements, proof of rental payments, proof of health insurance cards, and proof of any resources exceeding $500  CHW did inform pt that she would need to call her bank and request the statements  Pt agreed to do this  CHW did provide list of all things needed to complete waiver  Pt stated that this is all overwhelming for her and CHW reminded her that she would assist her throughout the entire process  Pt thanked CHW  CHW did discuss upcoming appointments with pt and lack of transportation  CHW did discuss with pt Michael Polina and [place] services of which pt stated that she could not afford to pay for  CHW offered to call her insurance company to inquire about any transportation services which she may be eligible for  CHW did call [employer] and was informed that pt is entitled to 50 one way trips per calendar year which is provided by [employer]  Pt would need to call [phone], three days in advance to schedule transportation  CHW did inform pt of this and she was grateful for the information  CHW did scan [employer] application and emailed it to Jose  Next outreach is scheduled for 4/14/2021

## 2021-04-09 LAB
LEFT EYE DIABETIC RETINOPATHY: NORMAL
RIGHT EYE DIABETIC RETINOPATHY: NORMAL

## 2021-04-13 ENCOUNTER — PATIENT OUTREACH (OUTPATIENT)
Dept: FAMILY MEDICINE CLINIC | Facility: CLINIC | Age: 61
End: 2021-04-13

## 2021-04-13 ENCOUNTER — TELEPHONE (OUTPATIENT)
Dept: LAB | Facility: HOSPITAL | Age: 61
End: 2021-04-13

## 2021-04-13 NOTE — PROGRESS NOTES
I called the patient to remind her she is due for labs (TFT's)  She forgot to schedule for the [facility] but has their number and states she will call  She was happy for the reminder  The patient reports she had an eye exam last week and was told she needs surgery for a new lens  She has a follow up appointment 5/5 and at that time she will receive the date of surgery  I will call the patient next week for my regular outreach

## 2021-04-14 ENCOUNTER — PATIENT OUTREACH (OUTPATIENT)
Dept: FAMILY MEDICINE CLINIC | Facility: CLINIC | Age: 61
End: 2021-04-14

## 2021-04-14 NOTE — PROGRESS NOTES
Patient called again stating she awoke early today because she had severe back pain and was crying  She tried CBD which made her "mellow"  She asked about the referral to Pain Management that her PCP ordered  I reviewed the chart and noted PM attempted to reach the patient  I provided her with the phone number for Pain Management and she will call to schedule

## 2021-04-14 NOTE — PROGRESS NOTES
I returned the patient's call  She stated she needed a medication refill  I reviewed the patient's med list and the med had 2 refills remaining but the patient states her bottle has zero refills  I called the pharmacy who informed me they dispensed #90 last month (not #30 with 2 refills as ordered)  The patient checked her bottle again and it states quantity 30/90  She states she will search if she has 2 other bottles in the home and call me back  I await her call  The patient notes she has her mobile lab draw scheduled for next week

## 2021-04-14 NOTE — PROGRESS NOTES
Outgoing Call / Email:  4/14/2021    CHW did call [employer] to discuss status of pt's [employer] application  CHW was informed that pt's [employer] number is missing  CHW did review previlously submitted application and was able to add the number and resubmit to [employer]  CHW did call pt to update her on this  Pt did not sound well  Pt informed CHW that she has been up since 4:30 this morning and has been crying in pain  Pt stated that she was recently informed that pain medication will not be prescribed to her however she has the option to receive injections  Pt stated that her mother dealt with the same thing for many years and it did not help her  Pt stated that she did not want to go through the same suffering her mother went through  Pt informed CHW that Marivel Ascencio RN, had called her earlier and made her feel better  Pt also stated that she believes she will eventually apply for a medical marijuana card  Pt stated that she has mentioned it to her PCP but never received any feedback in reference to this  Pt also stated that she did call a doctor that can prescribe her a medical marijuana card however she does not have a [place] state ID which is keeping her form applying at the moment  CHW expressed understanding and informed her that she will follow up with [employer] application next week  Pt thanked CHW for listening  Next outreach is scheduled for 4/21/2021

## 2021-04-14 NOTE — PROGRESS NOTES
The patient called back stating she found the 2 additional bottles and was quite relieved  She was very appreciative of my help

## 2021-04-18 DIAGNOSIS — E03.9 HYPOTHYROIDISM, UNSPECIFIED TYPE: ICD-10-CM

## 2021-04-19 RX ORDER — LEVOTHYROXINE SODIUM 0.05 MG/1
TABLET ORAL
Qty: 60 TABLET | Refills: 0 | Status: SHIPPED | OUTPATIENT
Start: 2021-04-19 | End: 2021-06-14

## 2021-04-20 ENCOUNTER — LAB (OUTPATIENT)
Dept: LAB | Facility: HOSPITAL | Age: 61
End: 2021-04-20
Attending: INTERNAL MEDICINE
Payer: COMMERCIAL

## 2021-04-20 DIAGNOSIS — E03.9 ACQUIRED HYPOTHYROIDISM: ICD-10-CM

## 2021-04-20 LAB
T4 FREE SERPL-MCNC: 0.94 NG/DL (ref 0.76–1.46)
TSH SERPL DL<=0.05 MIU/L-ACNC: 3.36 UIU/ML (ref 0.36–3.74)

## 2021-04-20 PROCEDURE — 36415 COLL VENOUS BLD VENIPUNCTURE: CPT

## 2021-04-20 PROCEDURE — 84439 ASSAY OF FREE THYROXINE: CPT

## 2021-04-20 PROCEDURE — 84443 ASSAY THYROID STIM HORMONE: CPT

## 2021-04-20 PROCEDURE — 86800 THYROGLOBULIN ANTIBODY: CPT

## 2021-04-20 PROCEDURE — 86376 MICROSOMAL ANTIBODY EACH: CPT

## 2021-04-21 ENCOUNTER — PATIENT OUTREACH (OUTPATIENT)
Dept: FAMILY MEDICINE CLINIC | Facility: CLINIC | Age: 61
End: 2021-04-21

## 2021-04-21 ENCOUNTER — TELEPHONE (OUTPATIENT)
Dept: ENDOCRINOLOGY | Facility: CLINIC | Age: 61
End: 2021-04-21

## 2021-04-21 LAB
THYROGLOB AB SERPL-ACNC: <1 IU/ML (ref 0–0.9)
THYROPEROXIDASE AB SERPL-ACNC: <9 IU/ML (ref 0–34)

## 2021-04-21 NOTE — TELEPHONE ENCOUNTER
----- Message from Morteza Kim MD sent at 4/21/2021 11:48 AM EDT -----  Please inform patient that thyroid blood work is normal, continue current dose of levothyroxine

## 2021-04-21 NOTE — PROGRESS NOTES
Outgoing Call:  4/21/2021    CHW did call pt to discuss status of [employer] application  Pt informed CHW that she does have a scheduled evaluation with Kun tomorrow at noon  CHW asked pt if she had received her bank statements  Pt stated that she had not  CHW offered to meet with her and attempt to create an account online so that she can retrieve statements needed to complete waiver application   Pt agreed to meet next week at [facility]     Next outreach is scheduled for 4/26/2021, at [facility] at [facility]

## 2021-04-23 ENCOUNTER — PATIENT OUTREACH (OUTPATIENT)
Dept: FAMILY MEDICINE CLINIC | Facility: CLINIC | Age: 61
End: 2021-04-23

## 2021-04-23 NOTE — PROGRESS NOTES
I called the patient but received voicemail  Message was left reminding her of her Cardiology appointment on Monday, 4/26 at 3pm   I informed her she was also supposed to meet with KATE Mcdonough, at this exact time  I left the message stating Mac Burgos agreed to meet her after her PCP appointment on 4/29 to avoid missing her Cardiology appointment on 4/26  If she has further questions, she has my contact information to call back

## 2021-04-26 ENCOUNTER — OFFICE VISIT (OUTPATIENT)
Dept: CARDIOLOGY CLINIC | Facility: CLINIC | Age: 61
End: 2021-04-26
Payer: COMMERCIAL

## 2021-04-26 ENCOUNTER — PATIENT OUTREACH (OUTPATIENT)
Dept: FAMILY MEDICINE CLINIC | Facility: CLINIC | Age: 61
End: 2021-04-26

## 2021-04-26 VITALS
SYSTOLIC BLOOD PRESSURE: 150 MMHG | HEART RATE: 75 BPM | BODY MASS INDEX: 42.89 KG/M2 | WEIGHT: 283 LBS | HEIGHT: 68 IN | DIASTOLIC BLOOD PRESSURE: 68 MMHG

## 2021-04-26 DIAGNOSIS — E11.42 TYPE 2 DIABETES MELLITUS WITH DIABETIC POLYNEUROPATHY, WITH LONG-TERM CURRENT USE OF INSULIN (HCC): ICD-10-CM

## 2021-04-26 DIAGNOSIS — Z79.4 TYPE 2 DIABETES MELLITUS WITH DIABETIC POLYNEUROPATHY, WITH LONG-TERM CURRENT USE OF INSULIN (HCC): ICD-10-CM

## 2021-04-26 DIAGNOSIS — I20.0 UNSTABLE ANGINA (HCC): ICD-10-CM

## 2021-04-26 DIAGNOSIS — Z79.4 CURRENT USE OF INSULIN (HCC): ICD-10-CM

## 2021-04-26 DIAGNOSIS — N18.32 STAGE 3B CHRONIC KIDNEY DISEASE (HCC): ICD-10-CM

## 2021-04-26 DIAGNOSIS — I25.10 CORONARY ARTERY DISEASE INVOLVING NATIVE HEART WITHOUT ANGINA PECTORIS, UNSPECIFIED VESSEL OR LESION TYPE: Primary | ICD-10-CM

## 2021-04-26 PROCEDURE — 3078F DIAST BP <80 MM HG: CPT

## 2021-04-26 PROCEDURE — 1036F TOBACCO NON-USER: CPT

## 2021-04-26 PROCEDURE — 99215 OFFICE O/P EST HI 40 MIN: CPT

## 2021-04-26 PROCEDURE — 3077F SYST BP >= 140 MM HG: CPT

## 2021-04-26 PROCEDURE — 3008F BODY MASS INDEX DOCD: CPT | Performed by: NURSE PRACTITIONER

## 2021-04-26 PROCEDURE — 3008F BODY MASS INDEX DOCD: CPT

## 2021-04-26 RX ORDER — CARVEDILOL 12.5 MG/1
12.5 TABLET ORAL 2 TIMES DAILY WITH MEALS
Qty: 60 TABLET | Refills: 3 | Status: SHIPPED | OUTPATIENT
Start: 2021-04-26 | End: 2021-06-04

## 2021-04-26 RX ORDER — NITROGLYCERIN 0.4 MG/1
0.4 TABLET SUBLINGUAL
Qty: 25 TABLET | Refills: 1 | Status: SHIPPED | OUTPATIENT
Start: 2021-04-26

## 2021-04-26 NOTE — PROGRESS NOTES
Cardiology   MD Abran Henley MD Delman Perkins, DO, [facility] MD Ngoc Thomas DO, Zack Rich DO, [facility] [place]  -------------------------------------------------------------------  [facility], [place] [place] [id number]  [phone]  [phone]  [phone]  04/26/21  Oscar Loomis  YOB: 1960   MRN: [id number]      Referring Physician: No referring provider defined for this encounter  HPI: Oscar Loomis is a 64 y o  female with coronary artery disease status post stents to unknown vessels in 2012 as well as 2017, diabetes, hypertension, dyslipidemia, osteoarthritis, obesity who presents today for re-evaluation  She states that she has 5 stents in her heart  Over the past several months she has had some episodes of chest pain/pressure that occurs at rest and with minor exertion and is relieved immediately with sublingual nitroglycerin  She states this feels similar to how it has felt in the past when she presented for care and ultimately required a stent to be placed  She states most recent episode was several days ago but was not bad enough for her to take a nitroglycerin  This has her very concerned  She had echo in January shows EF 60%    Review of Systems   Constitutional: Negative for chills and fever  HENT: Negative for facial swelling and sore throat  Eyes: Negative for visual disturbance  Respiratory: Negative for cough, chest tightness, shortness of breath and wheezing  Cardiovascular: Positive for chest pain  Negative for palpitations and leg swelling  Gastrointestinal: Negative for abdominal pain, blood in stool, constipation, diarrhea, nausea and vomiting  Endocrine: Negative for cold intolerance and heat intolerance  Genitourinary: Negative for decreased urine volume, difficulty urinating, dysuria and hematuria  Musculoskeletal: Negative for arthralgias, back pain and myalgias  Skin: Negative for rash  Neurological: Negative for dizziness, syncope, weakness and numbness  Psychiatric/Behavioral: Negative for agitation, behavioral problems and confusion  The patient is not nervous/anxious  OBJECTIVE  Vitals:    04/26/21 1450   BP: 150/68   Pulse: 75       Physical Exam  Constitutional: awake, alert and oriented, in no acute distress, no obvious deformities, obese female  Vital signs as above  Head: Normocephalic, without obvious abnormality, atraumatic  Eyes: conjunctivae clear and moist  Sclera anicteric  No xanthelasmas  Pupils equal bilaterally  Extraocular motions are full  Ear nose mouth and throat: ears are symmetrical bilaterally, hearing appears to be equal bilaterally, no nasal discharge or epistaxis, oropharynx is clear with moist mucous membranes  Neck:  Trachea is midline, neck is supple, no thyromegaly or significant lymphadenopathy, there is full range of motion  Lungs: clear to auscultation bilaterally, no wheezes, no rales, no rhonchi, no accessory muscle use, breathing is nonlabored  Heart: regular rate and rhythm, S1, S2 normal, no murmur, click, rub or gallop, no lower extremity edema  Abdomen:  Obese, soft, non-tender; bowel sounds normal; no masses,  no organomegaly  Psychiatric:  Patient is oriented to time, place, person, mood/affect is negative for depression, anxiety, agitation, appears to have appropriate insight  Skin: Skin is warm, dry, intact  No obvious rashes or lesions on exposed extremities  Nail beds are pink with no cyanosis or clubbing  EKG:  No results found for this visit on 04/26/21  IMPRESSION:  1  Coronary artery disease status post PCI x5 to unknown vessels in 2012 and 2017 in [place]   2  Diabetes, uncontrolled   3  Hypertension, uncontrolled   4  Dyslipidemia    DISCUSSION/RECOMMENDATIONS:  Berta Franklin She is now having symptoms are concerning for unstable angina    Chest pain occurring at rest, relieved immediately with sublingual nitroglycerin  This has happened now on 3 separate occasions, most recently several days ago  She has no chest pain today  She has limited ambulation with a cane   She describes her symptoms as similar to those which were card to stent in the past    She has uncontrolled diabetes as well as hypertension    Increase carvedilol to 12 5 mg b i d  today    Continue aspirin 81, Lipitor 40, Plavix 75, hydrochlorothiazide 12 5, lisinopril 20   Given her risk factors obesity, diabetes uncontrolled, hypertension uncontrolled, dyslipidemia, prior history of coronary disease with 5 stents, I believe her pretest probability of having significant underlying CAD is high given the symptoms that she describes which seemed to be consistent with unstable angina   She is not having symptoms today so I did not immediately refer her to the hospital this moment but I did describe to her if she does get the symptoms again she should immediately present to the emergency department   I believe we should investigate further given how typical the symptoms are and her history and risk factors with a cardiac catheterization and direct coronary visualization for further evaluation  Zoila Palacios DO, [facility] [place]  --------------------------------------------------------------------------------  TREADMILL STRESS  No results found for this or any previous visit    ----------------------------------------------------------------------------------------------  NUCLEAR STRESS TEST: No results found for this or any previous visit    No results found for this or any previous visit     --------------------------------------------------------------------------------  CATH:  No results found for this or any previous visit   --------------------------------------------------------------------------------  ECHO:   Results for orders placed during the hospital encounter of 01/25/21   Echo complete with contrast if indicated    Narrative [facility] [address]    Transthoracic Echocardiogram  2D, M-mode, Doppler, and Color Doppler    Study date:  2021    Patient: Lidia Izquierdo  MR number: [id number]  Account number: [de-identified]  : 1960  Age: 64 years  Gender: Female  Status: Outpatient  Location: [facility]  Height: 68 in  Weight: 279 4 lb  BP: 140/ 80 mmHg    Indications: CAD    Diagnoses: I25 83 - Coronary atherosclerosis due to lipid rich plaque    Sonographer:  Tereso Huerta RDCS  Interpreting Physician:  Patricia Huizar MD  Primary Physician:  NICOLE Briones  Referring Physician:  CHERELLE Zee  Group:  [facility]    IMPRESSIONS:  Technical quality: Good  Cardiac rhythm: Sinus with interventricular conduction delay    1  Normal left ventricular size and systolic function  Grade 1 diastolic dysfunction  EF around 60%  2  Normal right ventricular size and systolic function  3  Normal left and right atrial size  Aneurysmal interatrial septum without color-flow Doppler evidence of interatrial shunts  4  Mild aortic valve sclerosis, no aortic valve stenosis or regurgitation  5  Trace mitral and tricuspid valve regurgitation  6  No obvious pulmonary hypertension  7  Trace pericardial effusion  No previous echocardiogram is available for comparison  SUMMARY    LEFT VENTRICLE:  Normal left ventricular cavity size, mild concentric left ventricular hypertrophy, normal left ventricular systolic function and normal regional wall motion  Ejection fraction is estimated as around 60%  Doppler evaluation of left  ventricular suggests grade 1 diastolic dysfunction with normal estimated left atrial pressure  RIGHT VENTRICLE:  Normal right ventricular size and systolic function  Normal estimated right ventricular systolic pressure, 20 mmHg  LEFT ATRIUM:  Normal left atrial cavity size  Mild interatrial septal aneurysm with bidirectional motion  No color-flow Doppler evidence of interatrial shunts  RIGHT ATRIUM:  Normal right atrial cavity size  MITRAL VALVE:  Mild mitral valve leaflet sclerosis, adequate leaflet mobility  Trace mitral valve regurgitation  AORTIC VALVE:  Tricuspid aortic valve with mild sclerosis, adequate cuspal separation  No aortic valve stenosis or regurgitation  TRICUSPID VALVE:  Normal tricuspid valve morphology and leaflet separation  Trace tricuspid valve regurgitation  PULMONIC VALVE:  No significant pulmonic valve regurgitation  AORTA:  Aortic root and proximal ascending aorta are normal in size on 2D imaging  IVC, HEPATIC VEINS:  Inferior vena cava is normal in size and demonstrates appropriate respiratory phasic changes in diameter  PERICARDIUM:  Trace, hemodynamically insignificant pericardial effusion  HISTORY: PRIOR HISTORY: DM, CAD, HTN, CKD, Dyslipidemia Risk factors: morbid obesity  PROCEDURE: The study was performed in the [facility] OP  This was a routine study  The transthoracic approach was used  The study included complete 2D imaging, M-mode, complete spectral Doppler, and color Doppler  The heart rate was 84  bpm, at the start of the study  Image quality was adequate  LEFT VENTRICLE: Normal left ventricular cavity size, mild concentric left ventricular hypertrophy, normal left ventricular systolic function and normal regional wall motion  Ejection fraction is estimated as around 60%  Doppler evaluation  of left ventricular suggests grade 1 diastolic dysfunction with normal estimated left atrial pressure  RIGHT VENTRICLE: Normal right ventricular size and systolic function  Normal estimated right ventricular systolic pressure, 20 mmHg  LEFT ATRIUM: Normal left atrial cavity size  Mild interatrial septal aneurysm with bidirectional motion  No color-flow Doppler evidence of interatrial shunts  RIGHT ATRIUM: Normal right atrial cavity size      MITRAL VALVE: Mild mitral valve leaflet sclerosis, adequate leaflet mobility  Trace mitral valve regurgitation  AORTIC VALVE: Tricuspid aortic valve with mild sclerosis, adequate cuspal separation  No aortic valve stenosis or regurgitation  TRICUSPID VALVE: Normal tricuspid valve morphology and leaflet separation  Trace tricuspid valve regurgitation  PULMONIC VALVE: No significant pulmonic valve regurgitation  PERICARDIUM: Trace, hemodynamically insignificant pericardial effusion  AORTA: Aortic root and proximal ascending aorta are normal in size on 2D imaging  SYSTEMIC VEINS: IVC: Inferior vena cava is normal in size and demonstrates appropriate respiratory phasic changes in diameter  SYSTEM MEASUREMENT TABLES    2D  %FS: 31 08 %  Ao Diam: 3 02 cm  EDV(Teich): 112 85 ml  EF(Teich): 58 64 %  ESV(Teich): 46 68 ml  IVSd: 1 27 cm  LA Area: 16 71 cm2  LA Diam: 3 47 cm  LVEDV MOD A4C: 136 65 ml  LVEF MOD A4C: 61 17 %  LVESV MOD A4C: 53 06 ml  LVIDd: 4 9 cm  LVIDs: 3 38 cm  LVLd A4C: 9 8 cm  LVLs A4C: 8 41 cm  LVPWd: 1 33 cm  RA Area: 12 72 cm2  RVIDd: 2 73 cm  SV MOD A4C: 83 59 ml  SV(Teich): 66 17 ml    CW  TR Vmax: 1 6 m/s  TR maxPG: 10 22 mmHg    MM  TAPSE: 2 02 cm    PW  E' Sept: 0 08 m/s  E/E' Sept: 8 76  MV A Santosh: 1 19 m/s  MV Dec [place]: 2 26 m/s2  MV DecT: 329 84 ms  MV E Santosh: 0 74 m/s  MV E/A Ratio: 0 63  MV PHT: 95 65 ms  MVA By PHT: 2 3 cm2    [facility]    Prepared and electronically signed by    Grabiel Cazares MD  Signed 25-Jan-2021 18:10:36       No results found for this or any previous visit   --------------------------------------------------------------------------------  HOLTER  No results found for this or any previous visit  No results found for this or any previous visit   --------------------------------------------------------------------------------  CAROTIDS  No results found for this or any previous visit  --------------------------------------------------------------------------------  Diagnoses and all orders for this visit:    Coronary artery disease involving native heart without angina pectoris, unspecified vessel or lesion type  -     Cancel: NM myocardial perfusion spect (rx stress and/or rest); Future  -     Cardiac catheterization; Future  -     CBC and differential  -     Basic metabolic panel  -     Protime-INR; Future  -     carvedilol (COREG) 12 5 mg tablet; Take 1 tablet (12 5 mg total) by mouth 2 (two) times a day with meals  -     nitroglycerin (NITROSTAT) 0 4 mg SL tablet; Place 1 tablet (0 4 mg total) under the tongue every 5 (five) minutes as needed for chest pain    Unstable angina (HCC)  -     Cardiac catheterization; Future  -     CBC and differential  -     Basic metabolic panel  -     Protime-INR; Future    Type 2 diabetes mellitus with diabetic polyneuropathy, with long-term current use of insulin ([facility])  -     carvedilol (COREG) 12 5 mg tablet; Take 1 tablet (12 5 mg total) by mouth 2 (two) times a day with meals    Stage 3b chronic kidney disease (HCC)  -     carvedilol (COREG) 12 5 mg tablet; Take 1 tablet (12 5 mg total) by mouth 2 (two) times a day with meals    Current use of insulin ([facility])  -     carvedilol (COREG) 12 5 mg tablet;  Take 1 tablet (12 5 mg total) by mouth 2 (two) times a day with meals       ======================================================    Past Medical History:   Diagnosis Date    Abnormal liver function     Anemia     Anxiety     Arthritis     Chronic kidney disease     stage 3    Chronic narcotic dependence ([facility])     Chronic pain disorder     lower back, hands , neck and knees    Coronary artery disease     Depression     Diabetes mellitus ([facility] [address] )     GERD (gastroesophageal reflux disease)     no meds at present    Heart murmur     murmur    Hyperlipidemia     Hypertension     Open toe wound 12/2020    right big toe open calus but is dry at present  Renal disorder     Shortness of breath     exertion    Sleep apnea     doesn't use cpap     Past Surgical History:   Procedure Laterality Date    AMPUTATION      ANGIOPLASTY  2017    5    BREAST EXCISIONAL BIOPSY Left     BREAST SURGERY      CARDIAC CATHETERIZATION      CARPAL TUNNEL RELEASE Bilateral     CERVICAL FUSION      HYSTERECTOMY  2008    KNEE SURGERY      OOPHORECTOMY  2008    RI EXC SKIN BENIG 3 1-4 CM REMAINDR BODY N/A 12/21/2020    Procedure: EXCISION SEBACEOUS CYST X 5 SCALP;  Surgeon: Gerda Knutson MD;  Location: [address] [place] [address] MAIN OR;  Service: General    TOE AMPUTATION Left     TRIGGER FINGER RELEASE Right     4th Finger         Medications  Current Outpatient Medications   Medication Sig Dispense Refill    allopurinol (ZYLOPRIM) 300 mg tablet Take 300 mg by mouth daily      aspirin (ECOTRIN LOW STRENGTH) 81 mg EC tablet Take 1 tablet (81 mg total) by mouth daily 30 tablet 2    atorvastatin (LIPITOR) 40 mg tablet Take 1 tablet (40 mg total) by mouth daily 30 tablet 2    carvedilol (COREG) 12 5 mg tablet Take 1 tablet (12 5 mg total) by mouth 2 (two) times a day with meals 60 tablet 3    citalopram (CeleXA) 40 mg tablet Take 1 tablet (40 mg total) by mouth daily 30 tablet 2    clopidogrel (PLAVIX) 75 mg tablet Take 1 tablet (75 mg total) by mouth daily 30 tablet 2    Diclofenac Sodium (VOLTAREN) 1 % Apply 2 g topically 4 (four) times a day 100 g 1    diphenhydrAMINE (BENADRYL) 25 mg capsule Take 25 mg by mouth every 4 (four) hours as needed for allergies or sleep       gabapentin (NEURONTIN) 600 MG tablet Take 1 tablet (600 mg total) by mouth 4 (four) times a day 120 tablet 2    hydrochlorothiazide (MICROZIDE) 12 5 mg capsule Take 1 capsule (12 5 mg total) by mouth daily 30 capsule 2    HYDROcodone-acetaminophen (NORCO) 5-325 mg per tablet Take 2 tablets by mouth 2 (two) times a day as needed for painMax Daily Amount: 4 tablets 30 tablet 0    insulin glargine (Lantus SoloStar) 100 units/mL injection pen Inject 50 Units under the skin every 12 (twelve) hours 15 mL 3    insulin lispro (HumaLOG KwikPen) 100 units/mL injection pen Inject 15 Units under the skin 3 (three) times a day with meals 15 mL 5    levothyroxine 50 mcg tablet TAKE 1 TABLET BY MOUTH EVERY DAY 60 tablet 0    lisinopril (ZESTRIL) 20 mg tablet Take 1 tablet (20 mg total) by mouth daily 30 tablet 6    mupirocin (BACTROBAN) 2 % ointment Apply topically daily 22 g 0    nitroglycerin (NITROSTAT) 0 4 mg SL tablet Place 1 tablet (0 4 mg total) under the tongue every 5 (five) minutes as needed for chest pain 25 tablet 1    OLANZapine (ZyPREXA) 5 mg tablet Take 1 tablet (5 mg total) by mouth daily at bedtime 30 tablet 2    Continuous Blood Gluc  (5th Finger 2 [place]) YOUNG Use as directed 1 each 0    Continuous Blood Gluc Sensor (TARIS Biomed[employer]yle Iraida 14 Day Sensor) MISC USE 1 SENSOR EVERY 2 WEEKS 2 each 3    Dulaglutide 1 5 MG/0 5ML SOPN Inject 0 5 mL (1 5 mg total) under the skin once a week 4 pen 3    glucose blood (OneTouch Ultra) test strip Use 1 each daily Use as instructed 100 each 2    Insulin Pen Needle (BD Pen Needle Micro U/F) 32G X 6 MM MISC Use 3 (three) times a day 100 each 5    Insulin Syringe-Needle U-100 (BD Veo Insulin Syringe U/F) 31G X 15/64" 0 5 ML MISC Inject under the skin 3 (three) times a day 100 each 2    lactulose (CHRONULAC) 10 g/15 mL solution Take 20 g by mouth daily at bedtime For elev Ammonia level      Lancets (OneTouch Delica Plus [id number]) MISC USE TO TEST 3 TIMES DAILY 100 each 2    naloxone (NARCAN) 4 mg/0 1 mL nasal spray Administer 1 spray into a nostril  If breathing does not return to normal or if breathing difficulty resumes after 2-3 minutes, give another dose in the other nostril using a new spray   (Patient not taking: Reported on 4/26/2021) 1 each 1    povidone-iodine 10 % Apply topically once as needed for wound care for up to 1 dose (Patient not taking: Reported on 2021) 100 each 0    silver sulfadiazine (SILVADENE,SSD) 1 % cream Apply topically daily To burns on fingers, cover w/band-aid  (Patient not taking: Reported on 2021) 50 g 0    SPS 15 GM/60ML suspension        No current facility-administered medications for this visit        Facility-Administered Medications Ordered in Other Visits   Medication Dose Route Frequency Provider Last Rate Last Admin    doxycycline hyclate (VIBRAMYCIN) capsule 100 mg  100 mg Oral Once Jack Bonilla MD            Allergies   Allergen Reactions    Codeine      Other reaction(s): Nausea and Vomiting       Social History     Socioeconomic History    Marital status:      Spouse name: Not on file    Number of children: Not on file    Years of education: Not on file    Highest education level: Not on file   Occupational History    Not on file   Social Needs    Financial resource strain: Not on file    Food insecurity     Worry: Not on file     Inability: Not on file    Transportation needs     Medical: Not on file     Non-medical: Not on file   Tobacco Use    Smoking status: Former Smoker     Packs/day: 1 00     Years: 35 00     Pack years: 35 00     Types: Cigarettes     Quit date:      Years since quittin 3    Smokeless tobacco: Never Used   Substance and Sexual Activity    Alcohol use: Not Currently    Drug use: Never    Sexual activity: Not Currently   Lifestyle    Physical activity     Days per week: Not on file     Minutes per session: Not on file    Stress: Not on file   Relationships    Social connections     Talks on phone: Not on file     Gets together: Not on file     Attends Latter-day service: Not on file     Active member of club or organization: Not on file     Attends meetings of clubs or organizations: Not on file     Relationship status: Not on file    Intimate partner violence     Fear of current or ex partner: Not on file     Emotionally abused: Not on file     Physically abused: Not on file     Forced sexual activity: Not on file   Other Topics Concern    Not on file   Social History Narrative    Not on file        Family History   Problem Relation Age of Onset    Stroke Father     Heart disease Father     No Known Problems Mother     No Known Problems Sister     No Known Problems Daughter     No Known Problems Maternal Grandmother     No Known Problems Maternal Grandfather     No Known Problems Paternal Grandmother     No Known Problems Paternal Grandfather     No Known Problems Maternal Aunt     No Known Problems Maternal Aunt     No Known Problems Maternal Aunt     No Known Problems Maternal Aunt     No Known Problems Maternal Aunt     No Known Problems Maternal Aunt     No Known Problems Paternal Aunt        Lab Results   Component Value Date    WBC 8 50 02/17/2021    HGB 10 7 (L) 02/17/2021    HCT 33 1 (L) 02/17/2021    MCV 95 02/17/2021     02/17/2021      Lab Results   Component Value Date    SODIUM 142 02/17/2021    K 5 6 (H) 02/17/2021     (H) 02/17/2021    CO2 28 02/17/2021    BUN 38 (H) 02/17/2021    CREATININE 1 79 (H) 02/17/2021    GLUC 96 03/17/2021    CALCIUM 9 1 02/17/2021      Lab Results   Component Value Date    HGBA1C 9 3 (A) 03/10/2021      No results found for: CHOL  Lab Results   Component Value Date    HDL 45 12/18/2020     Lab Results   Component Value Date    LDLCALC 122 12/18/2020     Lab Results   Component Value Date    TRIG 162 (H) 12/18/2020     No results found for: CHOLHDL   No results found for: INR, PROTIME       Patient Active Problem List    Diagnosis Date Noted    Skin ulcer of hand, limited to breakdown of skin ([facility] [place] [address] ) 02/25/2021    HTN (hypertension) 12/21/2020    Diabetic ulcer of toe of right foot associated with type 2 diabetes mellitus, with muscle involvement without evidence of necrosis ([facility] [address] ) 11/25/2020    Head lump 11/11/2020    Need for follow-up by  11/11/2020    Normochromic normocytic anemia 09/09/2020    CKD (chronic kidney disease) stage 3, GFR 30-59 ml/min (HCC) 09/09/2020    Abnormal LFTs 09/09/2020    S/P cervical spinal fusion 09/09/2020    Major depressive disorder 09/02/2020    Anxiety 09/02/2020    CAD (coronary artery disease) 09/02/2020    Osteoarthritis of left knee 09/02/2020    Healthcare maintenance 09/02/2020    Cellulitis of finger of right hand 08/26/2020       Portions of the record may have been created with voice recognition software  Occasional wrong word or "sound a like" substitutions may have occurred due to the inherent limitations of voice recognition software  Read the chart carefully and recognize, using context, where substitutions have occurred          Anabela Contreras DO, [facility] [place]  4/26/2021 4:00 PM

## 2021-04-26 NOTE — PROGRESS NOTES
Outgoing Call:  4/26/2021    CHW did call pt to discuss meeting with her later this week as she has an appt with her Cardiologist today at the same time she had scheduled with CHW  Pt stated that she was not aware she had an appt today  CHW had informed her that her Nurse, Blanche Roper, had called her last week and left her a detailed message reminding her  Pt stated that she has not listened to her messages yet  Pt confirmed that she will be attending her cardiology appointment today  CHW was able to reschedule for this Wednesday       Next outreach is scheduled for 4/29/21 at 3:30PM, at [place]

## 2021-04-28 ENCOUNTER — TELEPHONE (OUTPATIENT)
Dept: CARDIOLOGY CLINIC | Facility: CLINIC | Age: 61
End: 2021-04-28

## 2021-04-28 ENCOUNTER — PATIENT OUTREACH (OUTPATIENT)
Dept: FAMILY MEDICINE CLINIC | Facility: CLINIC | Age: 61
End: 2021-04-28

## 2021-04-28 NOTE — TELEPHONE ENCOUNTER
Cath scheduled for 5/3/21 and all covid assessment questions answered "no"  by patient  Patient reminded to get labs done ASAP, but her response was "my cath is a ways off yet, I'll get it soon"  Called two more times to remind patient to get labs done  States she needs ride to get to laboratory  Second time patient states "oops, yeah I didn't go yet, I magdi forgot, but really don't have a ride " She is very nonchalant about the whole thing  As of Fri, 4/30, labs still not done  Per Waymond Gaucher in cath lab, Joselo Allan is going to bring her in early and do them morning of cath  We don't think she will attempt to go on her own

## 2021-04-28 NOTE — PROGRESS NOTES
I called the patient to remind her of her PCP appointment tomorrow, 4/29 at 4:20  I reminded the patient that KATE Chau plans on meeting her prior to her appointment so she is to arrive at 330 and she agreed  The patient is scheduled to have a cardiac catheterization on Monday, 5/3  She notes she has 5 stents and wants to make sure they are working properly  She states both her parents had cardiac disease and she wants to be proactive in making sure her heart is functioning as it should be  The patient notes she occasionally would experience chest pain and had to take Nitroglycerin which caused her concern  I will follow up next week after her procedure

## 2021-04-29 ENCOUNTER — PATIENT OUTREACH (OUTPATIENT)
Dept: FAMILY MEDICINE CLINIC | Facility: CLINIC | Age: 61
End: 2021-04-29

## 2021-04-29 NOTE — PROGRESS NOTES
Outgoing Call:  4/29/2021    CHW did call pt to confirm she had arrived at to [facility] for our scheduled meeting  Pt was upset and crying when CHW called  CHW asked pt if she was ok and pt stated that she was upset due to family issues back home  CHW asked if she would be ok and asked if she had someone with her for support  Pt stated that she would be fine and was just feeling emotional  Pt also stated that she was not alone and with her daughter who is being supportive  CHW asked pt if she would be attending her 4:20 appt today with CHERELLE Givens  Pt stated that she did not know she had an appointment  CHW informed her that she would reach out to clerical staff and have someone call her to reschedule this appointment  Pt thanked CHW  CHW did Rachaelrp to inform her pt has cancelled  Next outreach is scheduled for 5/6/2021

## 2021-04-30 ENCOUNTER — TELEPHONE (OUTPATIENT)
Dept: LAB | Facility: HOSPITAL | Age: 61
End: 2021-04-30

## 2021-04-30 ENCOUNTER — TELEPHONE (OUTPATIENT)
Dept: SURGERY | Facility: HOSPITAL | Age: 61
End: 2021-04-30

## 2021-04-30 RX ORDER — CLOPIDOGREL BISULFATE 75 MG/1
300 TABLET ORAL ONCE
Status: CANCELLED | OUTPATIENT
Start: 2021-04-30 | End: 2021-04-30

## 2021-04-30 RX ORDER — ASPIRIN 81 MG/1
324 TABLET, CHEWABLE ORAL ONCE
Status: CANCELLED | OUTPATIENT
Start: 2021-04-30 | End: 2021-04-30

## 2021-04-30 RX ORDER — SODIUM CHLORIDE 9 MG/ML
125 INJECTION, SOLUTION INTRAVENOUS CONTINUOUS
Status: CANCELLED | OUTPATIENT
Start: 2021-04-30

## 2021-04-30 NOTE — PROGRESS NOTES
Since she was so emotional, she must have forgotten that I spoke to her on 4/28 and reminded her of her PCP appointment

## 2021-05-03 ENCOUNTER — TELEPHONE (OUTPATIENT)
Dept: SURGERY | Facility: HOSPITAL | Age: 61
End: 2021-05-03

## 2021-05-03 ENCOUNTER — PATIENT OUTREACH (OUTPATIENT)
Dept: FAMILY MEDICINE CLINIC | Facility: CLINIC | Age: 61
End: 2021-05-03

## 2021-05-03 NOTE — PROGRESS NOTES
CANDACE SNOW outreached Pt by phone today as Pt was tearful with CHW during last outreach  CANDACE SNOW spoke with Pt, Pt states that she is doing much better today and is no longer sad  Pt had no other questions or concerns but was very appreciative of CANDACE SNOW outreach and communication  CANDACE SNOW encouraged Pt to reach out at anytime and Pt was thankful  CANDACE SNOW will remain available as needed for further assistance

## 2021-05-04 ENCOUNTER — PATIENT OUTREACH (OUTPATIENT)
Dept: FAMILY MEDICINE CLINIC | Facility: CLINIC | Age: 61
End: 2021-05-04

## 2021-05-04 NOTE — TELEPHONE ENCOUNTER
Called patient who is "thinking about it"  She is going to try to set up transportation and call back and give a few dates to pick from in the future  She does not have rides for procedures  She was  told to get the labs done, also, which she states she has no ride  Told patient about mobile lab that can come out to draw labs, prior to cath   Patient will call back         +  +

## 2021-05-04 NOTE — PROGRESS NOTES
I called the patient to follow up  I asked why the cardiac cath was not performed and she stated she cancelled it noting, "I wasn't ready"  When I spoke to the patient last week she was looking forward to have this done but she is now apprehensive  The patient was scheduled with the Mobile Lab to have labs drawn today but she stated they did not show up  I asked that she call back and reschedule  The patient's tone was somber during our conversation, unlike her usual cheery self  I asked if she was not feeling well and she did not respond right away, then finally answered "not really"  I asked if she did not feel well physically or emotionally and she stated "a little of both"  She denies SI or HI  I offered for someone to call and talk to her but she declined at this time  The patient did not check her blood sugar this morning but notes it had been running in the 200's  I reminded her of her Ophthalmology appointment for tomorrow, 5/5  I will continue to follow

## 2021-05-06 ENCOUNTER — PATIENT OUTREACH (OUTPATIENT)
Dept: FAMILY MEDICINE CLINIC | Facility: CLINIC | Age: 61
End: 2021-05-06

## 2021-05-06 NOTE — PROGRESS NOTES
Outgoing Call:  5/6/2021    CHW did call pt to discuss status of [facility] evaluation for [facility] services and also waiver application status  Pt informed CHW that she had not attended her [facility] evaluation and did not reschedule it  CHW informed pt she will need to call and reschedule as they will not call her to do this  Pt stated that she would  Pt also stated that she has not heard anything back from LANRE COSME in reference to her waiver application  CHW informed pt they can call next week if they had not reached out by then  Pt stated that would be fine  Net outreach is scheduled for 5/13/2021

## 2021-05-07 ENCOUNTER — PATIENT OUTREACH (OUTPATIENT)
Dept: FAMILY MEDICINE CLINIC | Facility: CLINIC | Age: 61
End: 2021-05-07
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
## 2021-05-08 DIAGNOSIS — Z79.4 TYPE 2 DIABETES MELLITUS WITH DIABETIC POLYNEUROPATHY, WITH LONG-TERM CURRENT USE OF INSULIN (HCC): ICD-10-CM

## 2021-05-08 DIAGNOSIS — E11.42 TYPE 2 DIABETES MELLITUS WITH DIABETIC POLYNEUROPATHY, WITH LONG-TERM CURRENT USE OF INSULIN (HCC): ICD-10-CM

## 2021-05-08 DIAGNOSIS — I25.10 CORONARY ARTERY DISEASE INVOLVING NATIVE HEART WITHOUT ANGINA PECTORIS, UNSPECIFIED VESSEL OR LESION TYPE: ICD-10-CM

## 2021-05-08 DIAGNOSIS — N18.32 STAGE 3B CHRONIC KIDNEY DISEASE (HCC): ICD-10-CM

## 2021-05-08 DIAGNOSIS — Z79.4 CURRENT USE OF INSULIN (HCC): ICD-10-CM

## 2021-05-09 DIAGNOSIS — E11.42 TYPE 2 DIABETES MELLITUS WITH DIABETIC POLYNEUROPATHY, WITH LONG-TERM CURRENT USE OF INSULIN (HCC): ICD-10-CM

## 2021-05-09 DIAGNOSIS — Z79.4 CURRENT USE OF INSULIN (HCC): ICD-10-CM

## 2021-05-09 DIAGNOSIS — Z79.4 TYPE 2 DIABETES MELLITUS WITH DIABETIC POLYNEUROPATHY, WITH LONG-TERM CURRENT USE OF INSULIN (HCC): ICD-10-CM

## 2021-05-09 DIAGNOSIS — N18.32 STAGE 3B CHRONIC KIDNEY DISEASE (HCC): ICD-10-CM

## 2021-05-09 DIAGNOSIS — I25.10 CORONARY ARTERY DISEASE INVOLVING NATIVE HEART WITHOUT ANGINA PECTORIS, UNSPECIFIED VESSEL OR LESION TYPE: ICD-10-CM

## 2021-05-10 ENCOUNTER — IMMUNIZATIONS (OUTPATIENT)
Dept: FAMILY MEDICINE CLINIC | Facility: HOSPITAL | Age: 61
End: 2021-05-10

## 2021-05-10 ENCOUNTER — PATIENT OUTREACH (OUTPATIENT)
Dept: FAMILY MEDICINE CLINIC | Facility: CLINIC | Age: 61
End: 2021-05-10

## 2021-05-10 DIAGNOSIS — Z23 ENCOUNTER FOR IMMUNIZATION: Primary | ICD-10-CM

## 2021-05-10 PROCEDURE — 91301 SARS-COV-2 / COVID-19 MRNA VACCINE (MODERNA) 100 MCG: CPT

## 2021-05-10 PROCEDURE — 0012A SARS-COV-2 / COVID-19 MRNA VACCINE (MODERNA) 100 MCG: CPT

## 2021-05-10 RX ORDER — ASPIRIN 81 MG/1
TABLET, COATED ORAL
Qty: 90 TABLET | Refills: 0 | Status: SHIPPED | OUTPATIENT
Start: 2021-05-10 | End: 2022-01-03

## 2021-05-10 RX ORDER — HYDROCHLOROTHIAZIDE 12.5 MG/1
CAPSULE, GELATIN COATED ORAL
Qty: 90 CAPSULE | Refills: 0 | Status: SHIPPED | OUTPATIENT
Start: 2021-05-10 | End: 2021-06-04

## 2021-05-10 NOTE — PROGRESS NOTES
In Person:  5/10/2021    CHW did meet with pt at [place] [id number]  Pt requested this location as she was being vaccinated at [facility]  Pt was with her daughter [place]  Pt did appear to be well and was positive in attitude  Pt did provide CHW with a letter received from [address] [place], [place] [address]es [place] which stated that she will no longer be eligible for MA if she does not produce documents needed prior to 5/28/21  CHW did make a list for pt and her daughter who agreed to assist in this process  Pt agreed that she will work on obtaining all the needed documents this week  Pt will need to provide copies of bank statements for the past five years, copies of both divorce decrees, letter of expenses and letter stating when she last filed her taxes with IRS  Next outreach is scheduled for 5/14/2021

## 2021-05-11 ENCOUNTER — PATIENT OUTREACH (OUTPATIENT)
Dept: FAMILY MEDICINE CLINIC | Facility: CLINIC | Age: 61
End: 2021-05-11

## 2021-05-11 NOTE — PROGRESS NOTES
CANDACE SNOW outreached to Pt by phone to follow up on Pts expressed interest in therapy  Pt has denied therapy in the past however recently expressed that she would like to connect to therapy in the community and stated she has been putting off her depression for a long time  CANDACE Snow expressed understanding  Pt stated she was just about to leave the house with her daughter but was in agreement for CANDACE SNOW to schedule an appt for her and contact her with the date and time  CANDACE SNOW outreached to [facility] and scheduled Pt intake appointment for  May 24th at 10:00am  CANDACE SNOW informed Pt that she will contact Pt the following day with information regarding the appointment and Pt was in agreement with this plan  CANDACE SNOW will remain available as needed  Update: 5/12/21 CANDACE SNOW contacted Pt to provide location and apt information and Pt expressed gratitude and understanding  CANDACE SNOW will remain available for further assistance as needed

## 2021-05-12 ENCOUNTER — PATIENT OUTREACH (OUTPATIENT)
Dept: FAMILY MEDICINE CLINIC | Facility: CLINIC | Age: 61
End: 2021-05-12

## 2021-05-12 ENCOUNTER — TELEPHONE (OUTPATIENT)
Dept: CARDIOLOGY CLINIC | Facility: CLINIC | Age: 61
End: 2021-05-12

## 2021-05-12 ENCOUNTER — TELEPHONE (OUTPATIENT)
Dept: LAB | Facility: HOSPITAL | Age: 61
End: 2021-05-12

## 2021-05-12 NOTE — TELEPHONE ENCOUNTER
Patient rescheduled for her heart cath on thurs 5/27/21  Quinn Nereyda answered "no" to all covid assessment questions and patient is having the mobile home lab to draw her blood work  Directions mailed to the patient on 5/12/21

## 2021-05-12 NOTE — PROGRESS NOTES
Outgoing Call:  2021    CHW did call pt and discussed status of paperwork needed to complete MA application  Pt stated that she did get a hold of someone in an [place] courthouse and ordered copies of both her divorce decrees  Pt stated that they are being mailed to her and she should receive them by 21  CHW asked about setting up an online account with her bank  Pt stated that she attempted to do this last night for some time and was unable to figure out how to do this  Pt will work on it tonight with her daughter's boyfriend  CHW informed pt that CANDACE Serrano, did schedule an appointment for her at [facility]  CHW provided time and date for this appointment  CHW also discussed other appointments that pt had missed in the past few weeks  Pt agreed to call to reschedule her missed diabetic education, cardiac cath, and PCP f/u appointments  Pt apologized for not keeping up with her appointments and stated that she is afraid to go to the cardio cath appointment because it reminds her of when her   during an angioplasty procedure  CHW expressed understanding  CHW did discuss setting up a My Chart account for pt to keep track of her appointments  CHW also offered to assist her in setting up an account and informed her that her daughter Kendy Dunn could call CHW and help to set up the fabiana on her smart phone  Pt agreed to speak to her daughter about this  After call with pt, CHW did call Tom at [facility] and rescheduled pt's missed evaluations  New appointment was rescheduled for 21 at 10:30AM  CHW did request round-trip transportation  CHW did call pt and informed her of this  Pt thanked CHW and agreed to attend appointment  Next outreach is scheduled for 2021

## 2021-05-18 ENCOUNTER — PATIENT OUTREACH (OUTPATIENT)
Dept: FAMILY MEDICINE CLINIC | Facility: CLINIC | Age: 61
End: 2021-05-18

## 2021-05-18 NOTE — PROGRESS NOTES
I called the patient to follow up  She states she is "ok I guess"  Her mood was much improved today  She is happy she is getting some things accomplished, ie, making appointments and getting needed paperwork together  The patient notes "I hurt so much"  She states she has arthritis, has a bulging disk in her back, neuropathy of her hands, along with knee and back pain  She states she will not see PM because all they offered for her was back injections which she refuses  She currently takes Tylenol without relief; will send note to PCP  The patient states in the past she took Norco 5mg  It was prescribed to take a few times during the day but she states she did not take it if she did not need it  She states she never abused it  She states she is willing to try anything for pain relief  The patient spoke of her depression  She states her chronic pain exacerbates her depression and vice versa  She admits she withdraws and stays in her room when she is depressed  She notes she is taking medication for her depression but does not think it is working; note sent to PCP  She is aware of her [facility]- appointment next week but would like me to call her to remind her  The patient was happy to report her fasting blood sugar this morning was 107  She admits she was off track recently but is trying her best to keep her numbers stable  The patient rescheduled her cardiac cath for next week  The patient spoke extensively about being "scared" about the cath  She stated "something is wrong" and is afraid of the outcome  She noted her mother had cardiac problems and her  passed away during the same procedure  I offered supportive listening  The patient had to reschedule her eye exam to next month  She has her [facility] evaluation tomorrow and they are providing transportation  I will continue to follow

## 2021-05-19 ENCOUNTER — TELEPHONE (OUTPATIENT)
Dept: LAB | Facility: HOSPITAL | Age: 61
End: 2021-05-19

## 2021-05-19 ENCOUNTER — PATIENT OUTREACH (OUTPATIENT)
Dept: FAMILY MEDICINE CLINIC | Facility: CLINIC | Age: 61
End: 2021-05-19

## 2021-05-19 NOTE — PROGRESS NOTES
[address] [place] [address] Call:  5/19/2021    CHW did call pt for f/u  Pt confirmed she was up and ready to go to her [facility] evaluation for [facility] services  Pt was waiting for Lanta to pick her up  Pt was positive in attitude and appeared to be motivated  Pt stated that she did receive her divorce decrees and would like to meet with CHW to provide copies and also to retrieve bank statements for her waiver as she has now created an online account to access her bank statements  Pt needs to provide last 5 years worth of statements for waiver application  Pt agreed to meet with CHW next week prior to her appointment with her PCP on 5/25/21  CHW will call her day prior to remind her  Next outreach is scheduled for 5/24/2021

## 2021-05-20 ENCOUNTER — PREP FOR PROCEDURE (OUTPATIENT)
Dept: INTERVENTIONAL RADIOLOGY/VASCULAR | Facility: CLINIC | Age: 61
End: 2021-05-20

## 2021-05-20 ENCOUNTER — OFFICE VISIT (OUTPATIENT)
Dept: UROLOGY | Facility: CLINIC | Age: 61
End: 2021-05-20
Payer: COMMERCIAL

## 2021-05-20 VITALS
BODY MASS INDEX: 43.35 KG/M2 | DIASTOLIC BLOOD PRESSURE: 100 MMHG | SYSTOLIC BLOOD PRESSURE: 160 MMHG | HEART RATE: 76 BPM | HEIGHT: 68 IN | WEIGHT: 286 LBS | RESPIRATION RATE: 20 BRPM

## 2021-05-20 DIAGNOSIS — R33.9 URINARY RETENTION: Primary | ICD-10-CM

## 2021-05-20 DIAGNOSIS — R39.11 URINARY HESITANCY: ICD-10-CM

## 2021-05-20 DIAGNOSIS — R39.198 DIFFICULTY URINATING: ICD-10-CM

## 2021-05-20 LAB
POST-VOID RESIDUAL VOLUME, ML POC: 479 ML
SL AMB  POCT GLUCOSE, UA: NORMAL
SL AMB LEUKOCYTE ESTERASE,UA: NORMAL
SL AMB POCT BILIRUBIN,UA: NORMAL
SL AMB POCT BLOOD,UA: NORMAL
SL AMB POCT CLARITY,UA: NORMAL
SL AMB POCT COLOR,UA: YELLOW
SL AMB POCT KETONES,UA: NORMAL
SL AMB POCT NITRITE,UA: NORMAL
SL AMB POCT PH,UA: 5
SL AMB POCT SPECIFIC GRAVITY,UA: 1.03
SL AMB POCT URINE PROTEIN: 300
SL AMB POCT UROBILINOGEN: NORMAL

## 2021-05-20 PROCEDURE — 99204 OFFICE O/P NEW MOD 45 MIN: CPT | Performed by: UROLOGY

## 2021-05-20 PROCEDURE — 51798 US URINE CAPACITY MEASURE: CPT | Performed by: UROLOGY

## 2021-05-20 PROCEDURE — 81002 URINALYSIS NONAUTO W/O SCOPE: CPT | Performed by: UROLOGY

## 2021-05-20 PROCEDURE — 3061F NEG MICROALBUMINURIA REV: CPT
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
## 2021-05-21 ENCOUNTER — TELEPHONE (OUTPATIENT)
Dept: UROLOGY | Facility: MEDICAL CENTER | Age: 61
End: 2021-05-21

## 2021-05-21 ENCOUNTER — PATIENT OUTREACH (OUTPATIENT)
Dept: FAMILY MEDICINE CLINIC | Facility: CLINIC | Age: 61
End: 2021-05-21

## 2021-05-21 DIAGNOSIS — S16.1XXA STRAIN OF NECK MUSCLE, INITIAL ENCOUNTER: ICD-10-CM

## 2021-05-21 DIAGNOSIS — M17.12 PRIMARY OSTEOARTHRITIS OF LEFT KNEE: ICD-10-CM

## 2021-05-21 DIAGNOSIS — Z98.1 S/P CERVICAL SPINAL FUSION: ICD-10-CM

## 2021-05-21 DIAGNOSIS — N32.89 BLADDER SPASM: Primary | ICD-10-CM

## 2021-05-21 RX ORDER — LIDOCAINE 50 MG/G
1 PATCH TOPICAL DAILY
Qty: 90 PATCH | Refills: 1 | Status: SHIPPED | OUTPATIENT
Start: 2021-05-21 | End: 2021-09-01 | Stop reason: HOSPADM

## 2021-05-21 RX ORDER — HYDROCODONE BITARTRATE AND ACETAMINOPHEN 5; 325 MG/1; MG/1
1 TABLET ORAL 2 TIMES DAILY PRN
Qty: 60 TABLET | Refills: 0 | Status: SHIPPED | OUTPATIENT
Start: 2021-05-21 | End: 2021-07-13 | Stop reason: SDUPTHER

## 2021-05-21 RX ORDER — LIDOCAINE 50 MG/G
1 PATCH TOPICAL DAILY
Qty: 90 PATCH | Refills: 1 | OUTPATIENT
Start: 2021-05-21

## 2021-05-21 NOTE — TELEPHONE ENCOUNTER
Patient of Dr Cazares Record seen in the [address] office  Patient is scheduled for a US and because she has a cathter she would like to know how she is suppose to have a full bladder for the US  Please advise

## 2021-05-21 NOTE — TELEPHONE ENCOUNTER
Called and spoke with patient  Informed patient to ignore the full bladder part since she has a morales catheter  Patient verbalized understanding

## 2021-05-21 NOTE — PROGRESS NOTES
I called the patient to inform her that pain patches and [place] were sent to the pharmacy  She was relieved to hear this and states she will use the Norco sparingly  I did mention to her about returning to PM but she again stated all they offered her were injections to her back and she does not want this  I suggested they could have alternative treatments as well  The patient had a catheter placed yesterday by Urology  She states it is bulky and will have to get used to it  I will be calling her Monday morning to remind her of her Bet-[facility] appointment and she agreed

## 2021-05-24 ENCOUNTER — PATIENT OUTREACH (OUTPATIENT)
Dept: FAMILY MEDICINE CLINIC | Facility: CLINIC | Age: 61
End: 2021-05-24

## 2021-05-24 ENCOUNTER — TELEPHONE (OUTPATIENT)
Dept: CARDIOLOGY CLINIC | Facility: CLINIC | Age: 61
End: 2021-05-24

## 2021-05-24 NOTE — PROGRESS NOTES
Outgoing Calls:  5/24/2021    CHW did call [place] to inquire about status of pt's [employer] application  Howie informed CHW that pt has been approved for [employer] and will not have to pay for any medical appointment trips  CHW did call pt and informed her of this and also explained to her how to utilize the service  CHW did also inform pt that she should be receiving a welcome packet from [facility]  Pt thanked CHW  CHW reminded pt of her appointments with her PCP and CHW tomorrow  Pt sounded surprised by this but then stated that she would be there for her appointments  CHW reminded pt to bring her user ID and password for her online banking account so that CHW can assist pt in downloading copies of 68 bank statements needed to complete waiver application requirements  Pt agreed       Next outreach is scheduled for 5/25/2021 at 2:45PM, at [place]

## 2021-05-24 NOTE — TELEPHONE ENCOUNTER
Called patient for the third time in one and a half weeks  Patient is to get labs done prior to cath  I gave her phone number for mobile home draw lab that is free of charge and totally available  Patient has yet to set up labs, and cath will need to be cancelled if she does not comply  Last cath scheduled, special arrangements were made for labs to be done day of cath and patient did not show for cath at all  Patient has transportation issues but promises to have labs done and show up this time, but again, labs are not done and it is three days prior to procedure

## 2021-05-24 NOTE — PROGRESS NOTES
I called the patient to remind her of her [facility] appointment this morning but she stated she was unable to make it because she did not have transportation  I provided her with Ilan's phone number to call and reschedule  I reminded her she can use [employer] for transportation for appointments  I also reminded the patient of her PCP appointment tomorrow as well as meeting with Sarthak Rodrigues, prior to the appointment; time confirmed at 245  She plans on attending  I will call the patient in 2 days to remind her of her cardiac cath

## 2021-05-24 NOTE — PROGRESS NOTES
I called the patient to remind her to call the [address] to have her labs drawn for her cardiac cath  I asked that if they cannot come to her home in time, she will need to go to a lab for the draw  She stated she will call now

## 2021-05-25 ENCOUNTER — PATIENT OUTREACH (OUTPATIENT)
Dept: FAMILY MEDICINE CLINIC | Facility: CLINIC | Age: 61
End: 2021-05-25

## 2021-05-25 ENCOUNTER — OFFICE VISIT (OUTPATIENT)
Dept: FAMILY MEDICINE CLINIC | Facility: CLINIC | Age: 61
End: 2021-05-25

## 2021-05-25 VITALS
BODY MASS INDEX: 42.27 KG/M2 | HEIGHT: 68 IN | SYSTOLIC BLOOD PRESSURE: 140 MMHG | OXYGEN SATURATION: 97 % | DIASTOLIC BLOOD PRESSURE: 82 MMHG | TEMPERATURE: 96 F | HEART RATE: 83 BPM | RESPIRATION RATE: 18 BRPM | WEIGHT: 278.9 LBS

## 2021-05-25 DIAGNOSIS — E11.42 TYPE 2 DIABETES MELLITUS WITH DIABETIC POLYNEUROPATHY, WITH LONG-TERM CURRENT USE OF INSULIN (HCC): Primary | ICD-10-CM

## 2021-05-25 DIAGNOSIS — R41.3 MEMORY IMPAIRMENT: ICD-10-CM

## 2021-05-25 DIAGNOSIS — R41.0 CONFUSION: ICD-10-CM

## 2021-05-25 DIAGNOSIS — Z79.4 TYPE 2 DIABETES MELLITUS WITH DIABETIC POLYNEUROPATHY, WITH LONG-TERM CURRENT USE OF INSULIN (HCC): Primary | ICD-10-CM

## 2021-05-25 DIAGNOSIS — J42 CHRONIC BRONCHITIS, UNSPECIFIED CHRONIC BRONCHITIS TYPE (HCC): ICD-10-CM

## 2021-05-25 DIAGNOSIS — F33.2 SEVERE EPISODE OF RECURRENT MAJOR DEPRESSIVE DISORDER, WITHOUT PSYCHOTIC FEATURES (HCC): ICD-10-CM

## 2021-05-25 LAB — SL AMB POCT GLUCOSE BLD: 80

## 2021-05-25 PROCEDURE — 82948 REAGENT STRIP/BLOOD GLUCOSE: CPT | Performed by: NURSE PRACTITIONER

## 2021-05-25 PROCEDURE — 99214 OFFICE O/P EST MOD 30 MIN: CPT | Performed by: NURSE PRACTITIONER

## 2021-05-25 NOTE — PROGRESS NOTES
I returned the patient's call  She could not remember why she left a message on my voicemail yesterday  We discussed the need to have labs drawn for her cardiac cath on Thursday  She did not recall if she contacted the [facility] to schedule  Upon further review of the chart, there is an appointment scheduled for a home lab draw tomorrow  I called  Mobile Lab and confirmed the patient is scheduled for a draw tomorrow morning at 1030  Johnan Cassette Patient informed me she is having leaking issues with her catheter and she wants to take it out  I suggested she call Urology so they are aware and hopefully provide assistance  The patient is aware of her PCP appointment later this afternoon and will be meeting [place], CHW, prior to the appointment; time confirmed at 245  [facility] inform [facility] patient of the patient's lab draw tomorrow at 1030

## 2021-05-25 NOTE — PROGRESS NOTES
The patient is currently meeting with KATE Garvey  The patient is here with her daughter, Laurie Lugo  Both mentioned that the patient has been falling out of bed; will send note to PCP  The patient denies any recollection of the events except for 1 time    Per her daughter, the events occur around 2 am

## 2021-05-25 NOTE — PROGRESS NOTES
I received a call from the patient stating she is having trouble with her catheter as it is leaking and asked what to do  I asked her if she remembered me speaking with her this morning when I advised her to call Urology  She stated she did not recall our conversation  She notes she could have been sleeping while she spoke to me  She denies any other symptoms or complaints  I provided Urology's phone number and she stated she will call right after our call ended  I again reminded her that she will be seeing KATE Guerra, this afternoon at 245 and then her PCP  I also informed her that I confirmed her lab draw for tomorrow morning at 1030  I will be calling her prior to then to remind her and to be sure she is awake
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
## 2021-05-25 NOTE — TELEPHONE ENCOUNTER
Called and spoke with patient  Patient states she took a nap and woke up and she was soaked in urine  Patient believes catheter may be out a little as she keeps leaking  Patient to head to [facility] office for 1:30 for nurse to check morales

## 2021-05-25 NOTE — TELEPHONE ENCOUNTER
Patient called stating her catheter came out somewhat  She states its leaking and wants to know how to proceed      She can be reached at [phone] (M)

## 2021-05-26 ENCOUNTER — TELEPHONE (OUTPATIENT)
Dept: SURGERY | Facility: HOSPITAL | Age: 61
End: 2021-05-26

## 2021-05-26 ENCOUNTER — TRANSCRIBE ORDERS (OUTPATIENT)
Dept: LAB | Facility: HOSPITAL | Age: 61
End: 2021-05-26

## 2021-05-26 ENCOUNTER — TELEPHONE (OUTPATIENT)
Dept: NON INVASIVE DIAGNOSTICS | Facility: HOSPITAL | Age: 61
End: 2021-05-26

## 2021-05-26 ENCOUNTER — APPOINTMENT (OUTPATIENT)
Dept: LAB | Facility: HOSPITAL | Age: 61
End: 2021-05-26
Payer: COMMERCIAL

## 2021-05-26 ENCOUNTER — TELEPHONE (OUTPATIENT)
Dept: CARDIOLOGY CLINIC | Facility: CLINIC | Age: 61
End: 2021-05-26

## 2021-05-26 ENCOUNTER — TELEPHONE (OUTPATIENT)
Dept: LAB | Facility: HOSPITAL | Age: 61
End: 2021-05-26

## 2021-05-26 ENCOUNTER — PATIENT OUTREACH (OUTPATIENT)
Dept: FAMILY MEDICINE CLINIC | Facility: CLINIC | Age: 61
End: 2021-05-26

## 2021-05-26 DIAGNOSIS — N18.32 STAGE 3B CHRONIC KIDNEY DISEASE (HCC): Primary | ICD-10-CM

## 2021-05-26 PROBLEM — R41.3 MEMORY IMPAIRMENT: Status: ACTIVE | Noted: 2021-05-26

## 2021-05-26 PROBLEM — E11.42 TYPE 2 DIABETES MELLITUS WITH DIABETIC POLYNEUROPATHY, WITH LONG-TERM CURRENT USE OF INSULIN (HCC): Status: ACTIVE | Noted: 2020-09-02

## 2021-05-26 LAB
ANION GAP SERPL CALCULATED.3IONS-SCNC: 12 MMOL/L (ref 4–13)
BASOPHILS # BLD AUTO: 0.07 THOUSANDS/ΜL (ref 0–0.1)
BASOPHILS NFR BLD AUTO: 1 % (ref 0–1)
BUN SERPL-MCNC: 44 MG/DL (ref 5–25)
CALCIUM SERPL-MCNC: 8.7 MG/DL (ref 8.3–10.1)
CHLORIDE SERPL-SCNC: 108 MMOL/L (ref 100–108)
CO2 SERPL-SCNC: 28 MMOL/L (ref 21–32)
CREAT SERPL-MCNC: 2.63 MG/DL (ref 0.6–1.3)
EOSINOPHIL # BLD AUTO: 0.28 THOUSAND/ΜL (ref 0–0.61)
EOSINOPHIL NFR BLD AUTO: 3 % (ref 0–6)
ERYTHROCYTE [DISTWIDTH] IN BLOOD BY AUTOMATED COUNT: 13.8 % (ref 11.6–15.1)
GFR SERPL CREATININE-BSD FRML MDRD: 19 ML/MIN/1.73SQ M
GLUCOSE SERPL-MCNC: 86 MG/DL (ref 65–140)
HCT VFR BLD AUTO: 33.5 % (ref 34.8–46.1)
HGB BLD-MCNC: 10.5 G/DL (ref 11.5–15.4)
IMM GRANULOCYTES # BLD AUTO: 0.05 THOUSAND/UL (ref 0–0.2)
IMM GRANULOCYTES NFR BLD AUTO: 1 % (ref 0–2)
LYMPHOCYTES # BLD AUTO: 2.89 THOUSANDS/ΜL (ref 0.6–4.47)
LYMPHOCYTES NFR BLD AUTO: 28 % (ref 14–44)
MCH RBC QN AUTO: 31.5 PG (ref 26.8–34.3)
MCHC RBC AUTO-ENTMCNC: 31.3 G/DL (ref 31.4–37.4)
MCV RBC AUTO: 101 FL (ref 82–98)
MONOCYTES # BLD AUTO: 0.84 THOUSAND/ΜL (ref 0.17–1.22)
MONOCYTES NFR BLD AUTO: 8 % (ref 4–12)
NEUTROPHILS # BLD AUTO: 6.38 THOUSANDS/ΜL (ref 1.85–7.62)
NEUTS SEG NFR BLD AUTO: 59 % (ref 43–75)
NRBC BLD AUTO-RTO: 0 /100 WBCS
PLATELET # BLD AUTO: 263 THOUSANDS/UL (ref 149–390)
PMV BLD AUTO: 10 FL (ref 8.9–12.7)
POTASSIUM SERPL-SCNC: 5.3 MMOL/L (ref 3.5–5.3)
RBC # BLD AUTO: 3.33 MILLION/UL (ref 3.81–5.12)
SODIUM SERPL-SCNC: 148 MMOL/L (ref 136–145)
WBC # BLD AUTO: 10.51 THOUSAND/UL (ref 4.31–10.16)

## 2021-05-26 PROCEDURE — 85025 COMPLETE CBC W/AUTO DIFF WBC: CPT

## 2021-05-26 PROCEDURE — 36415 COLL VENOUS BLD VENIPUNCTURE: CPT

## 2021-05-26 PROCEDURE — 80048 BASIC METABOLIC PNL TOTAL CA: CPT

## 2021-05-26 RX ORDER — ASPIRIN 81 MG/1
324 TABLET, CHEWABLE ORAL ONCE
Status: CANCELLED | OUTPATIENT
Start: 2021-06-03

## 2021-05-26 RX ORDER — CLOPIDOGREL BISULFATE 75 MG/1
300 TABLET ORAL ONCE
Status: CANCELLED | OUTPATIENT
Start: 2021-06-03

## 2021-05-26 NOTE — TELEPHONE ENCOUNTER
Labs drawn today by mobile lab and completed in epic system  Patient can proceed with heart cath on 5/27/21

## 2021-05-26 NOTE — TELEPHONE ENCOUNTER
Received a message about the patient's blood work today  Baseline creatinine is around 1 5 and today it is 2 63  I discussed the case with the [facility] cath lab and the patient's primary cardiologist   With her current renal function cardiac catheterization needs to be canceled for tomorrow and she needs to establish with a nephrologist prior to proceeding  I called the patient's primary number twice ([phone]) and left to voicemails reiterating the above explaining that her catheterization needs to be canceled and we are giving her a referral to Nephrology  If she has chest discomfort she should of course immediately come to the emergency department instead  I called the number listed for her daughter ([phone]) and there was no answer and the voice mailbox was full so I could not leave a message  I called the number listed as "home" ([phone]) and a male into the phone saying this was her "back up" number and requested that I call the 501 line as noted above  The patient's cardiac catheterization needs to be canceled  Discussed with cath lab referral placed for Nephrology

## 2021-05-26 NOTE — ASSESSMENT & PLAN NOTE
Lab Results   Component Value Date    HGBA1C 9 3 (A) 03/10/2021     In office glucose 83 today  Glucose levels have been much better controlled   Continue with current regimen: Basaglar 50 units bid, Humalog 15 units tid, Trulicity 1 5 mg weekly

## 2021-05-26 NOTE — PROGRESS NOTES
In Person / Rojelio Bran / Venkat Gomez / Email:  5/25/2021    CHW did meet with pt and her daughter Nicole Strange at [place] for scheduled appointment  CHW worked on retrieving 72 bank statements from [employer] account  CHW assisted pt in calling her bank to reset her online account as she provided inaccurate information to access account  Once this was completed it took CHW about one hour to retrieve and print all statements  CHW did also make copies of pt's divorce decrees needed to complete financial piece to complete waiver application  CHW did also type a letter for pt which states the amount of rent she is responsible for, that she is responsible for providing her own meals, and that she has not filed income taxes in numerous years  Once CHW completed mentioned tasks and after meeting with pt and her daughter, CHW did stamp each document (approx  205 pages) with pt's SSN  CHW did scan all documents in sections as the file would be too large to scan in one file, and did email to [facility]  Pt stated that she will need assistance in seeking health insurance for her daughter [place]  CHW informed pt that it can be worked on at next outreach  Next outreach is scheduled for 6/1/2021

## 2021-05-26 NOTE — ASSESSMENT & PLAN NOTE
Patient with progressively increasing forgetfulness   Based on encounters with complex case manager patient does appear to have some short term memory loss  MMSE exam administered in office today; patient scored 29/30  Will check ammonia level  Advised to journal throughout the day events, phone calls, etc so that she may look back for recall and to jog her memory

## 2021-05-26 NOTE — PROGRESS NOTES
I called [facility] to see if they racheal the patient's labs  I was told they would be at the home at some point between 10-12 but were unable to tell me if they were there  I called the patient and asked her if she remembers speaking with me earlier this morning and she replied "vaguely"  I again informed her the lab will be coming to her home this morning and she stated someone was already there  She noted they racheal about 3 vials (nothing is showing 'in process' at the time of this writing)  I reminded the patient of 2 appointments scheduled for tomorrow  I asked that she review them on her AVS from yesterday's PCP visit but she did not know where this was  I provided the time and address of both appointments and she wrote the info down  She stated her daughter and daughter's boyfriend will be taking her to these appointments  I will continue to follow  Case discussed with KATE Lee

## 2021-05-26 NOTE — PROGRESS NOTES
I called the patient to see if she can have the lab drawn that was ordered by her PCP yesterday ([address] unable to draw this morning since they did not have the proper tubes and it needed to be on ice) when she is at the hospital tomorrow for her procedure  I will continue to follow

## 2021-05-26 NOTE — ASSESSMENT & PLAN NOTE
Blood pressure at goal 140/82  Continue with current regimen lisinopril 20 mg daily, hctz 12 5 mg daily, carvedilol 6 25 mg bid

## 2021-05-27 ENCOUNTER — HOSPITAL ENCOUNTER (OUTPATIENT)
Dept: ULTRASOUND IMAGING | Facility: HOSPITAL | Age: 61
Discharge: HOME | End: 2021-05-27
Payer: COMMERCIAL

## 2021-05-27 DIAGNOSIS — Z79.4 TYPE 2 DIABETES MELLITUS WITH FOOT ULCER, WITH LONG-TERM CURRENT USE OF INSULIN (HCC): ICD-10-CM

## 2021-05-27 DIAGNOSIS — E11.621 TYPE 2 DIABETES MELLITUS WITH FOOT ULCER, WITH LONG-TERM CURRENT USE OF INSULIN (HCC): ICD-10-CM

## 2021-05-27 DIAGNOSIS — R29.6 FALLS FREQUENTLY: Primary | ICD-10-CM

## 2021-05-27 DIAGNOSIS — R33.9 URINARY RETENTION: ICD-10-CM

## 2021-05-27 DIAGNOSIS — L97.509 TYPE 2 DIABETES MELLITUS WITH FOOT ULCER, WITH LONG-TERM CURRENT USE OF INSULIN (HCC): ICD-10-CM

## 2021-05-27 PROCEDURE — 76770 US EXAM ABDO BACK WALL COMP: CPT

## 2021-05-27 RX ORDER — INSULIN GLARGINE 100 [IU]/ML
50 INJECTION, SOLUTION SUBCUTANEOUS EVERY 12 HOURS SCHEDULED
Qty: 15 ML | Refills: 3 | Status: SHIPPED | OUTPATIENT
Start: 2021-05-27 | End: 2021-06-04

## 2021-06-01 ENCOUNTER — PATIENT OUTREACH (OUTPATIENT)
Dept: FAMILY MEDICINE CLINIC | Facility: CLINIC | Age: 61
End: 2021-06-01

## 2021-06-01 RX ORDER — OXYBUTYNIN CHLORIDE 10 MG/1
10 TABLET, EXTENDED RELEASE ORAL
Qty: 30 TABLET | Refills: 3 | Status: ON HOLD | OUTPATIENT
Start: 2021-06-01 | End: 2021-08-27

## 2021-06-01 NOTE — PROGRESS NOTES
Outgoing Calls:  6/1/2021    CHW did call pt to discuss status of waiver documents CHW had submitted last week to [facility]  CHW did assist in calling to be sure all has been received and moving along well  CHW did facilitate a three-way call to [place] [employer]  CHW and pt were informed that they have not received or uploaded documents to their system and CHW should resubmit to both [facility]  CHW did forward all five emails previously submitted to both agencies  CHW did inquire about pt's daughter wanting to apply for health insurance  We were informed that she could apply and should apply on her own as she is currently 26 y o  and may be denied if she applies with pt due to income  CHW offered pt to have [place] call CHW and it could be done on [employer]  CHW did receive a call from pt stating that she needed the number for her Urologist as her catheter is leaking  CHW did provide this number  Pt also informed CHW that [place] is not interested in applying for health insurance at this time as she would rather focus on seeing if pt will be approved for waiver  Next outreach is scheduled for 6/8/2021

## 2021-06-01 NOTE — TELEPHONE ENCOUNTER
Called and spoke to pt she is having leakage from her cath and she stated it is a quick drip  Last cath change was two days ago  Pt noted there is times there is puddles   Please advise what pt should go

## 2021-06-01 NOTE — TELEPHONE ENCOUNTER
Patient called stating she is having issues with catheter  It continues to leak  She is not sure what else to do  She would like a call back      Patient can be reached at [phone] (M)

## 2021-06-02 ENCOUNTER — PATIENT OUTREACH (OUTPATIENT)
Dept: FAMILY MEDICINE CLINIC | Facility: CLINIC | Age: 61
End: 2021-06-02
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
## 2021-06-02 NOTE — TELEPHONE ENCOUNTER
Informed patient that she can start the oxybutynin that was sent to her pharmacy on file  Informed patient that it can cause dry mouth and constipation and to make sure she is staying hydrated  Patient verbalized understanding    Patient questioned whether a follow up was needed  I did not see that she was scheduled for anything at this time  She stated there her daughter changes her catheter at home  Please advise on follow up

## 2021-06-03 ENCOUNTER — HOSPITAL ENCOUNTER (OUTPATIENT)
Dept: CT IMAGING | Facility: HOSPITAL | Age: 61
Discharge: HOME | End: 2021-06-03
Attending: RADIOLOGY
Payer: COMMERCIAL

## 2021-06-03 DIAGNOSIS — R33.9 URINARY RETENTION: ICD-10-CM

## 2021-06-03 PROCEDURE — G1004 CDSM NDSC: HCPCS

## 2021-06-03 PROCEDURE — 74176 CT ABD & PELVIS W/O CONTRAST: CPT

## 2021-06-04 ENCOUNTER — APPOINTMENT (OUTPATIENT)
Dept: LAB | Facility: HOSPITAL | Age: 61
End: 2021-06-04
Payer: COMMERCIAL

## 2021-06-04 ENCOUNTER — OFFICE VISIT (OUTPATIENT)
Dept: FAMILY MEDICINE CLINIC | Facility: CLINIC | Age: 61
End: 2021-06-04

## 2021-06-04 VITALS
OXYGEN SATURATION: 97 % | HEIGHT: 68 IN | RESPIRATION RATE: 20 BRPM | SYSTOLIC BLOOD PRESSURE: 130 MMHG | DIASTOLIC BLOOD PRESSURE: 72 MMHG | BODY MASS INDEX: 42.83 KG/M2 | WEIGHT: 282.6 LBS | HEART RATE: 68 BPM | TEMPERATURE: 97.4 F

## 2021-06-04 DIAGNOSIS — R51.9 INTRACTABLE HEADACHE, UNSPECIFIED CHRONICITY PATTERN, UNSPECIFIED HEADACHE TYPE: ICD-10-CM

## 2021-06-04 DIAGNOSIS — L97.509 TYPE 2 DIABETES MELLITUS WITH FOOT ULCER, WITH LONG-TERM CURRENT USE OF INSULIN (HCC): Primary | ICD-10-CM

## 2021-06-04 DIAGNOSIS — R41.0 CONFUSION: ICD-10-CM

## 2021-06-04 DIAGNOSIS — R42 DIZZINESS: ICD-10-CM

## 2021-06-04 DIAGNOSIS — Z79.4 CURRENT USE OF INSULIN (HCC): ICD-10-CM

## 2021-06-04 DIAGNOSIS — I25.10 CORONARY ARTERY DISEASE INVOLVING NATIVE HEART WITHOUT ANGINA PECTORIS, UNSPECIFIED VESSEL OR LESION TYPE: ICD-10-CM

## 2021-06-04 DIAGNOSIS — E11.621 TYPE 2 DIABETES MELLITUS WITH FOOT ULCER, WITH LONG-TERM CURRENT USE OF INSULIN (HCC): Primary | ICD-10-CM

## 2021-06-04 DIAGNOSIS — Z79.4 TYPE 2 DIABETES MELLITUS WITH DIABETIC POLYNEUROPATHY, WITH LONG-TERM CURRENT USE OF INSULIN (HCC): ICD-10-CM

## 2021-06-04 DIAGNOSIS — N18.4 ACUTE RENAL FAILURE SUPERIMPOSED ON STAGE 4 CHRONIC KIDNEY DISEASE, UNSPECIFIED ACUTE RENAL FAILURE TYPE (HCC): ICD-10-CM

## 2021-06-04 DIAGNOSIS — N18.32 STAGE 3B CHRONIC KIDNEY DISEASE (HCC): ICD-10-CM

## 2021-06-04 DIAGNOSIS — N18.4 CHRONIC RENAL DISEASE, STAGE IV (HCC): ICD-10-CM

## 2021-06-04 DIAGNOSIS — E11.42 TYPE 2 DIABETES MELLITUS WITH DIABETIC POLYNEUROPATHY, WITH LONG-TERM CURRENT USE OF INSULIN (HCC): ICD-10-CM

## 2021-06-04 DIAGNOSIS — M10.9 GOUT, UNSPECIFIED CAUSE, UNSPECIFIED CHRONICITY, UNSPECIFIED SITE: ICD-10-CM

## 2021-06-04 DIAGNOSIS — Z79.4 TYPE 2 DIABETES MELLITUS WITH FOOT ULCER, WITH LONG-TERM CURRENT USE OF INSULIN (HCC): Primary | ICD-10-CM

## 2021-06-04 DIAGNOSIS — N17.9 ACUTE RENAL FAILURE SUPERIMPOSED ON STAGE 4 CHRONIC KIDNEY DISEASE, UNSPECIFIED ACUTE RENAL FAILURE TYPE (HCC): ICD-10-CM

## 2021-06-04 DIAGNOSIS — R26.89 BALANCE DISORDER: ICD-10-CM

## 2021-06-04 LAB
ALBUMIN SERPL BCP-MCNC: 3.6 G/DL (ref 3–5.2)
ALP SERPL-CCNC: 97 U/L (ref 43–122)
ALT SERPL W P-5'-P-CCNC: 22 U/L
AMMONIA PLAS-SCNC: <9 UMOL/L (ref 9–33)
ANION GAP SERPL CALCULATED.3IONS-SCNC: 8 MMOL/L (ref 5–14)
AST SERPL W P-5'-P-CCNC: 30 U/L (ref 14–36)
BASOPHILS # BLD AUTO: 0.1 THOUSANDS/ΜL (ref 0–0.1)
BASOPHILS NFR BLD AUTO: 1 % (ref 0–1)
BILIRUB SERPL-MCNC: 0.34 MG/DL
BUN SERPL-MCNC: 54 MG/DL (ref 5–25)
CALCIUM SERPL-MCNC: 8.9 MG/DL (ref 8.4–10.2)
CHLORIDE SERPL-SCNC: 110 MMOL/L (ref 97–108)
CO2 SERPL-SCNC: 23 MMOL/L (ref 22–30)
CREAT SERPL-MCNC: 2.44 MG/DL (ref 0.6–1.2)
EOSINOPHIL # BLD AUTO: 0.3 THOUSAND/ΜL (ref 0–0.4)
EOSINOPHIL NFR BLD AUTO: 3 % (ref 0–6)
ERYTHROCYTE [DISTWIDTH] IN BLOOD BY AUTOMATED COUNT: 14.2 %
GFR SERPL CREATININE-BSD FRML MDRD: 21 ML/MIN/1.73SQ M
GLUCOSE SERPL-MCNC: 50 MG/DL (ref 70–99)
HCT VFR BLD AUTO: 30.6 % (ref 36–46)
HGB BLD-MCNC: 10.4 G/DL (ref 12–16)
LYMPHOCYTES # BLD AUTO: 2.1 THOUSANDS/ΜL (ref 0.5–4)
LYMPHOCYTES NFR BLD AUTO: 20 % (ref 25–45)
MCH RBC QN AUTO: 32.1 PG (ref 26–34)
MCHC RBC AUTO-ENTMCNC: 33.8 G/DL (ref 31–36)
MCV RBC AUTO: 95 FL (ref 80–100)
MONOCYTES # BLD AUTO: 0.9 THOUSAND/ΜL (ref 0.2–0.9)
MONOCYTES NFR BLD AUTO: 9 % (ref 1–10)
NEUTROPHILS # BLD AUTO: 7 THOUSANDS/ΜL (ref 1.8–7.8)
NEUTS SEG NFR BLD AUTO: 67 % (ref 45–65)
PLATELET # BLD AUTO: 297 THOUSANDS/UL (ref 150–450)
PMV BLD AUTO: 7.7 FL (ref 8.9–12.7)
POTASSIUM SERPL-SCNC: 5.2 MMOL/L (ref 3.6–5)
PROT SERPL-MCNC: 6.4 G/DL (ref 5.9–8.4)
RBC # BLD AUTO: 3.23 MILLION/UL (ref 4–5.2)
SL AMB POCT GLUCOSE BLD: 67
SODIUM SERPL-SCNC: 141 MMOL/L (ref 137–147)
WBC # BLD AUTO: 10.3 THOUSAND/UL (ref 4.5–11)

## 2021-06-04 PROCEDURE — 80053 COMPREHEN METABOLIC PANEL: CPT

## 2021-06-04 PROCEDURE — 82140 ASSAY OF AMMONIA: CPT

## 2021-06-04 PROCEDURE — 3066F NEPHROPATHY DOC TX: CPT

## 2021-06-04 PROCEDURE — 99214 OFFICE O/P EST MOD 30 MIN: CPT | Performed by: NURSE PRACTITIONER

## 2021-06-04 PROCEDURE — 82948 REAGENT STRIP/BLOOD GLUCOSE: CPT | Performed by: NURSE PRACTITIONER

## 2021-06-04 PROCEDURE — 85025 COMPLETE CBC W/AUTO DIFF WBC: CPT

## 2021-06-04 PROCEDURE — 36415 COLL VENOUS BLD VENIPUNCTURE: CPT

## 2021-06-04 RX ORDER — INSULIN GLARGINE 100 [IU]/ML
40 INJECTION, SOLUTION SUBCUTANEOUS EVERY 12 HOURS SCHEDULED
Qty: 15 ML | Refills: 3 | Status: SHIPPED | OUTPATIENT
Start: 2021-06-04 | End: 2021-08-23

## 2021-06-04 RX ORDER — INSULIN LISPRO 100 [IU]/ML
10 INJECTION, SOLUTION INTRAVENOUS; SUBCUTANEOUS
Qty: 15 ML | Refills: 5 | Status: SHIPPED | OUTPATIENT
Start: 2021-06-04 | End: 2021-10-14

## 2021-06-04 RX ORDER — CARVEDILOL 25 MG/1
25 TABLET ORAL 2 TIMES DAILY WITH MEALS
Qty: 60 TABLET | Refills: 0 | Status: SHIPPED | OUTPATIENT
Start: 2021-06-04 | End: 2021-06-28

## 2021-06-04 RX ORDER — ALLOPURINOL 300 MG/1
300 TABLET ORAL DAILY
Qty: 90 TABLET | Refills: 1 | Status: SHIPPED | OUTPATIENT
Start: 2021-06-04 | End: 2021-11-29

## 2021-06-04 NOTE — ASSESSMENT & PLAN NOTE
Lab Results   Component Value Date    EGFR 21 (L) 06/04/2021    EGFR 19 05/26/2021    EGFR 31 (L) 02/17/2021    CREATININE 2 44 (H) 06/04/2021    CREATININE 2 63 (H) 05/26/2021    CREATININE 1 79 (H) 02/17/2021      On chart review labs drawn for cardiac cath showed decrease in kidney function form baseline   Advise patient to have stat labs repeated, discussed possible need to present to hospital if kidney function has not worsened or improved due to acute on chronic kidney disease

## 2021-06-04 NOTE — ASSESSMENT & PLAN NOTE
Lab Results   Component Value Date    HGBA1C 9 3 (A) 03/10/2021     Glucose often low 80's, today 60 then 59- required multiple glucose tabs and soda to get to 85  Decrease lantus to 40 units bid, decrease Humalog to 10 units TID w/ meals if WD>501  Continue Trulicity 1 5 mg weekly

## 2021-06-04 NOTE — LETTER
June 4, 2021     Patient: Narda Charles   YOB: 1960   Date of Visit: 6/4/2021       To Whom it May Concern:    Narda Charles was seen in my clinic on 6/4/2021  She was accompanied by Hong Sahu for safety  If you have any questions or concerns, please don't hesitate to call           Sincerely,          CHERELLE Elkins        CC: No Recipients
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
## 2021-06-04 NOTE — ASSESSMENT & PLAN NOTE
Blood pressure at goal 130/72  Given her declining kidney fx will hold lisinopril and hctz at this time and increase Coreg to 25 mg bid  Advise daughter and patient careful BP monitoring and to inform me of persistently >140/90

## 2021-06-05 DIAGNOSIS — F41.9 INSOMNIA SECONDARY TO ANXIETY: ICD-10-CM

## 2021-06-05 DIAGNOSIS — F51.05 INSOMNIA SECONDARY TO ANXIETY: ICD-10-CM

## 2021-06-07 RX ORDER — OLANZAPINE 5 MG/1
TABLET ORAL
Qty: 90 TABLET | Refills: 0 | Status: SHIPPED | OUTPATIENT
Start: 2021-06-07 | End: 2021-09-07

## 2021-06-07 NOTE — TELEPHONE ENCOUNTER
Spoke to patient and she is scheduled to see Gregor at [facility] on 9/7/21 at 2:30  Rifampin Pregnancy And Lactation Text: This medication is Pregnancy Category C and it isn't know if it is safe during pregnancy. It is also excreted in breast milk and should not be used if you are breast feeding.

## 2021-06-08 ENCOUNTER — PATIENT OUTREACH (OUTPATIENT)
Dept: FAMILY MEDICINE CLINIC | Facility: CLINIC | Age: 61
End: 2021-06-08

## 2021-06-08 ENCOUNTER — TELEPHONE (OUTPATIENT)
Dept: RADIOLOGY | Facility: HOSPITAL | Age: 61
End: 2021-06-08

## 2021-06-08 RX ORDER — SODIUM CHLORIDE 9 MG/ML
75 INJECTION, SOLUTION INTRAVENOUS CONTINUOUS
Status: CANCELLED | OUTPATIENT
Start: 2021-06-08

## 2021-06-08 NOTE — PROGRESS NOTES
Outgoing Calls:  6/8/2021    CHW did call [place] [facility] to inquire about emails/documents sent to them on behalf of pt for review and in reference to waiver services  CHW was on hold for 43 minutes and was informed that they are in receipt of the documents and were reviewed and pt has been financially approved for waiver services  CHW did call pt to discuss this and pt confirmed she also received a call yesterday informing her that her  will complete a phone interview with her today at [address] [place] [address] and hr daughter will be present for this call  CHW did explain what to expect moving forward  CHW did speak to pt in reference to [address]  Pt stated that she did complete [employer] packet but needs to look for it  Pt stated that she downloaded the [employer] but has not created an account  Pt stated that she will work on this with her daughter this week  Pt stated that she received her welcome packet from [place] [address] services  CHW reminded pt how to use the service and that she also has services through [facility] for free transportation as well  Next outreach is scheduled for 6/15/2021

## 2021-06-09 ENCOUNTER — PATIENT OUTREACH (OUTPATIENT)
Dept: FAMILY MEDICINE CLINIC | Facility: CLINIC | Age: 61
End: 2021-06-09

## 2021-06-09 NOTE — PROGRESS NOTES
I called the patient to remind her of Cardiology appointment tomorrow; she was aware and plans on attending  She states her daughter will be going with her  The patient reports sinus drainage which is causing her to have a sore throat  She denies any cough or fever  She has been using Claritin with some relief and using lozenges  I asked that if she worsens or feels her current meds are not helping adequately to contact us  The patient states her blood sugars are elevated again and went back to her usual dose of insulin  She states she had a reading in the 300's but now most are still in the 200's  The patient states she feels fine after lisinopril and HCTZ were discontinued  She notes her urine output is surprisingly large even after stopping her diuretic  She is scheduled for a suprapubic catheter next week and would like a reminder call on Monday  The patient questioned whether her placard has been completed noting it was requested but she has not heard an update; will send note to PCP  I will continue to follow

## 2021-06-10 ENCOUNTER — OFFICE VISIT (OUTPATIENT)
Dept: CARDIOLOGY CLINIC | Facility: CLINIC | Age: 61
End: 2021-06-10
Payer: COMMERCIAL

## 2021-06-10 ENCOUNTER — PATIENT OUTREACH (OUTPATIENT)
Dept: FAMILY MEDICINE CLINIC | Facility: CLINIC | Age: 61
End: 2021-06-10

## 2021-06-10 ENCOUNTER — TELEPHONE (OUTPATIENT)
Dept: FAMILY MEDICINE CLINIC | Facility: CLINIC | Age: 61
End: 2021-06-10

## 2021-06-10 VITALS
BODY MASS INDEX: 44.41 KG/M2 | DIASTOLIC BLOOD PRESSURE: 76 MMHG | WEIGHT: 293 LBS | HEIGHT: 68 IN | RESPIRATION RATE: 18 BRPM | HEART RATE: 76 BPM | SYSTOLIC BLOOD PRESSURE: 128 MMHG

## 2021-06-10 DIAGNOSIS — I25.10 CORONARY ARTERY DISEASE INVOLVING NATIVE HEART WITHOUT ANGINA PECTORIS, UNSPECIFIED VESSEL OR LESION TYPE: Primary | ICD-10-CM

## 2021-06-10 DIAGNOSIS — I20.8 STABLE ANGINA (HCC): ICD-10-CM

## 2021-06-10 PROCEDURE — 99214 OFFICE O/P EST MOD 30 MIN: CPT

## 2021-06-10 PROCEDURE — 3008F BODY MASS INDEX DOCD: CPT

## 2021-06-10 RX ORDER — ISOSORBIDE MONONITRATE 30 MG/1
30 TABLET, EXTENDED RELEASE ORAL DAILY
Qty: 30 TABLET | Refills: 6 | Status: SHIPPED | OUTPATIENT
Start: 2021-06-10 | End: 2021-09-15 | Stop reason: SDUPTHER
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
## 2021-06-10 NOTE — PROGRESS NOTES
Incoming Call:  6/10/2021    CHW did receive a call from pt stating that she has been expecting an email from her  but has not received it  The list should contain a list of A providers that would hire her daughter [place]  CHW asked her if she checked in her spam mail and pt stated that she did not know how to look into that part of her email  CHW asked if [place] could assist and she stated that she was still sleeping  CHW [facility] pt to [facility] and asked them who her SC is and what her direct number is as pt does not know this information  Pt agreed to call them and call CHW to inform her of any updates  Next outreach is scheduled for 6/11/2021

## 2021-06-10 NOTE — PROGRESS NOTES
Cardiology   MD Hector Fiore MD Enrico Lango, DO, MD Vinny Aranda DO, Lilian Kowalski DO, [facility] [place]  -------------------------------------------------------------------  [facility]  [address] [place] [id number]  [phone]  [phone]  06/10/21  Judy Nicole  YOB: 1960   MRN: [id number]      Referring Physician: Shayla Abdi, [facility] [address]     HPI: Judy Nicole is a 64 y o  female with coronary artery disease status post stents to unknown vessels in 2012 as well as 2017, diabetes, hypertension, dyslipidemia, osteoarthritis, obesity who presents today for re-evaluation  She states that she has 5 stents in her heart  Over the past several months she has had some episodes of chest pain/pressure that occurs at rest and with minor exertion and is relieved immediately with sublingual nitroglycerin  She states this feels similar to how it has felt in the past when she presented for care and ultimately required a stent to be placed  Because of her symptoms I had recommended a cardiac catheterization  Unfortunately her kidney function had worsened and she was having urinary retention  She has been seen by Urology and now has a [place] catheter placed and is wearing a bag  It seems like she is going to need a suprapubic catheter however  She has appointment with Nephrology scheduled in about 2 weeks  The thought from urology is that she has a neurogenic bladder secondary to her diabetes with diabetic neuropathy  She is still having significant dyspnea with exertion  Review of Systems   Constitutional: Negative for chills and fever  HENT: Negative for facial swelling and sore throat  Eyes: Negative for visual disturbance  Respiratory: Positive for chest tightness and shortness of breath  Negative for cough and wheezing      Cardiovascular: Positive for chest pain  Negative for palpitations and leg swelling  Gastrointestinal: Negative for abdominal pain, blood in stool, constipation, diarrhea, nausea and vomiting  Endocrine: Negative for cold intolerance and heat intolerance  Genitourinary: Negative for decreased urine volume, difficulty urinating, dysuria and hematuria  Musculoskeletal: Negative for arthralgias, back pain and myalgias  Skin: Negative for rash  Neurological: Negative for dizziness, syncope, weakness and numbness  Psychiatric/Behavioral: Negative for agitation, behavioral problems and confusion  The patient is not nervous/anxious  OBJECTIVE  Vitals:    06/10/21 1400   BP: 128/76   Pulse: 76   Resp: 18       Physical Exam  Constitutional: awake, alert and oriented, in no acute distress, no obvious deformities, obese female  Head: Normocephalic, without obvious abnormality, atraumatic  Eyes: conjunctivae clear and moist  Sclera anicteric  No xanthelasmas  Pupils equal bilaterally  Extraocular motions are full  Ear nose mouth and throat: ears are symmetrical bilaterally, hearing appears to be equal bilaterally, no nasal discharge or epistaxis, oropharynx is clear with moist mucous membranes  Neck:  Trachea is midline, neck is supple, no thyromegaly or significant lymphadenopathy, there is full range of motion  Lungs: clear to auscultation bilaterally, no wheezes, no rales, no rhonchi, no accessory muscle use, breathing is nonlabored  Heart: regular rate and rhythm, S1, S2 normal, no murmur, click, rub or gallop, no lower extremity edema  Abdomen:  Obese, soft, non-tender; bowel sounds normal; no masses,  no organomegaly  Psychiatric:  Patient is oriented to time, place, person, mood/affect is negative for depression, anxiety, agitation, appears to have appropriate insight  Skin: Skin is warm, dry, intact  No obvious rashes or lesions on exposed extremities    Nail beds are pink with no cyanosis or clubbing  EKG:  No results found for this visit on 06/10/21  IMPRESSION:  1  Coronary artery disease status post PCI x5 to unknown vessels in 2012 and 2017 in [place] , with symptoms that are concerning for angina  2  Diabetes, uncontrolled   3  Hypertension, uncontrolled   4  Dyslipidemia    DISCUSSION/RECOMMENDATIONS:  Armando Madrigal She states that she has not had to take her nitroglycerin since her last visit with me back in April  She does still continue to have symptoms of stable angina with dyspnea on exertion  She is currently on aspirin 81, Lipitor 40, carvedilol 25, Plavix 75 as well as sublingual nitroglycerin   She needs a cardiac catheterization however she has significant issues with her renal function and she is also going to need a suprapubic catheter placement  For now would continue with medical therapy for coronary disease while we have her renal function evaluated and optimized  Would add Imdur 30 mg daily today  May need to up titrate this dose depending on her symptoms and/or consider adding Ranexa in the future  Given her symptoms and the high likelihood of coronary disease I would like to keep a very close eye on her, plan for follow-up in about 1 month after she is evaluated again with urology and nephrology, with ultimate goal of cardiac catheterization occurring hopefully sometime in July as long as she remains stable  Reeta Cockayne, DO, [facility] [place]  --------------------------------------------------------------------------------  TREADMILL STRESS  No results found for this or any previous visit    ----------------------------------------------------------------------------------------------  NUCLEAR STRESS TEST: No results found for this or any previous visit    No results found for this or any previous visit     --------------------------------------------------------------------------------  CATH:  No results found for this or any previous visit   --------------------------------------------------------------------------------  ECHO:   Results for orders placed during the hospital encounter of 21   Echo complete with contrast if indicated    Narrative [facility] [address]    Transthoracic Echocardiogram  2D, M-mode, Doppler, and Color Doppler    Study date:  2021    Patient: Corine Viramontes  MR number: [id number]  Account number: [de-identified]  : 1960  Age: 64 years  Gender: Female  Status: Outpatient  Location: [facility]  Height: 68 in  Weight: 279 4 lb  BP: 140/ 80 mmHg    Indications: CAD    Diagnoses: I25 83 - Coronary atherosclerosis due to lipid rich plaque    Sonographer:  Consuelo Pérez RDCS  Interpreting Physician:  Bee Ortiz MD  Primary Physician:  NICOLE Zhang  Referring Physician:  CHERELLE Villanueva  Group:  [facility]    IMPRESSIONS:  Technical quality: Good  Cardiac rhythm: Sinus with interventricular conduction delay    1  Normal left ventricular size and systolic function  Grade 1 diastolic dysfunction  EF around 60%  2  Normal right ventricular size and systolic function  3  Normal left and right atrial size  Aneurysmal interatrial septum without color-flow Doppler evidence of interatrial shunts  4  Mild aortic valve sclerosis, no aortic valve stenosis or regurgitation  5  Trace mitral and tricuspid valve regurgitation  6  No obvious pulmonary hypertension  7  Trace pericardial effusion  No previous echocardiogram is available for comparison  SUMMARY    LEFT VENTRICLE:  Normal left ventricular cavity size, mild concentric left ventricular hypertrophy, normal left ventricular systolic function and normal regional wall motion  Ejection fraction is estimated as around 60%  Doppler evaluation of left  ventricular suggests grade 1 diastolic dysfunction with normal estimated left atrial pressure      RIGHT VENTRICLE:  Normal right ventricular size and systolic function  Normal estimated right ventricular systolic pressure, 20 mmHg  LEFT ATRIUM:  Normal left atrial cavity size  Mild interatrial septal aneurysm with bidirectional motion  No color-flow Doppler evidence of interatrial shunts  RIGHT ATRIUM:  Normal right atrial cavity size  MITRAL VALVE:  Mild mitral valve leaflet sclerosis, adequate leaflet mobility  Trace mitral valve regurgitation  AORTIC VALVE:  Tricuspid aortic valve with mild sclerosis, adequate cuspal separation  No aortic valve stenosis or regurgitation  TRICUSPID VALVE:  Normal tricuspid valve morphology and leaflet separation  Trace tricuspid valve regurgitation  PULMONIC VALVE:  No significant pulmonic valve regurgitation  AORTA:  Aortic root and proximal ascending aorta are normal in size on 2D imaging  IVC, HEPATIC VEINS:  Inferior vena cava is normal in size and demonstrates appropriate respiratory phasic changes in diameter  PERICARDIUM:  Trace, hemodynamically insignificant pericardial effusion  HISTORY: PRIOR HISTORY: DM, CAD, HTN, CKD, Dyslipidemia Risk factors: morbid obesity  PROCEDURE: The study was performed in the [facility] OP  This was a routine study  The transthoracic approach was used  The study included complete 2D imaging, M-mode, complete spectral Doppler, and color Doppler  The heart rate was 84  bpm, at the start of the study  Image quality was adequate  LEFT VENTRICLE: Normal left ventricular cavity size, mild concentric left ventricular hypertrophy, normal left ventricular systolic function and normal regional wall motion  Ejection fraction is estimated as around 60%  Doppler evaluation  of left ventricular suggests grade 1 diastolic dysfunction with normal estimated left atrial pressure  RIGHT VENTRICLE: Normal right ventricular size and systolic function  Normal estimated right ventricular systolic pressure, 20 mmHg      LEFT ATRIUM: Normal left atrial cavity size  Mild interatrial septal aneurysm with bidirectional motion  No color-flow Doppler evidence of interatrial shunts  RIGHT ATRIUM: Normal right atrial cavity size  MITRAL VALVE: Mild mitral valve leaflet sclerosis, adequate leaflet mobility  Trace mitral valve regurgitation  AORTIC VALVE: Tricuspid aortic valve with mild sclerosis, adequate cuspal separation  No aortic valve stenosis or regurgitation  TRICUSPID VALVE: Normal tricuspid valve morphology and leaflet separation  Trace tricuspid valve regurgitation  PULMONIC VALVE: No significant pulmonic valve regurgitation  PERICARDIUM: Trace, hemodynamically insignificant pericardial effusion  AORTA: Aortic root and proximal ascending aorta are normal in size on 2D imaging  SYSTEMIC VEINS: IVC: Inferior vena cava is normal in size and demonstrates appropriate respiratory phasic changes in diameter      SYSTEM MEASUREMENT TABLES    2D  %FS: 31 08 %  Ao Diam: 3 02 cm  EDV(Teich): 112 85 ml  EF(Teich): 58 64 %  ESV(Teich): 46 68 ml  IVSd: 1 27 cm  LA Area: 16 71 cm2  LA Diam: 3 47 cm  LVEDV MOD A4C: 136 65 ml  LVEF MOD A4C: 61 17 %  LVESV MOD A4C: 53 06 ml  LVIDd: 4 9 cm  LVIDs: 3 38 cm  LVLd A4C: 9 8 cm  LVLs A4C: 8 41 cm  LVPWd: 1 33 cm  RA Area: 12 72 cm2  RVIDd: 2 73 cm  SV MOD A4C: 83 59 ml  SV(Teich): 66 17 ml    CW  TR Vmax: 1 6 m/s  TR maxPG: 10 22 mmHg    MM  TAPSE: 2 02 cm    PW  E' Sept: 0 08 m/s  E/E' Sept: 8 76  MV A Santosh: 1 19 m/s  MV Dec Woodruff: 2 26 m/s2  MV DecT: 329 84 ms  MV E Santosh: 0 74 m/s  MV E/A Ratio: 0 63  MV PHT: 95 65 ms  MVA By PHT: 2 3 cm2    [facility]    Prepared and electronically signed by    Nica Fontana MD  Signed 25-Jan-2021 18:10:36       No results found for this or any previous visit   --------------------------------------------------------------------------------  HOLTER  No results found for this or any previous visit    No results found for this or any previous visit   --------------------------------------------------------------------------------  CAROTIDS  No results found for this or any previous visit    --------------------------------------------------------------------------------  There are no diagnoses linked to this encounter    ======================================================    Past Medical History:   Diagnosis Date    Abnormal liver function     Anemia     Anxiety     Arthritis     Chronic kidney disease     stage 3    Chronic narcotic dependence [facility]     Chronic pain disorder     lower back, hands , neck and knees    Coronary artery disease     Depression     Diabetes mellitus ([facility] )     GERD (gastroesophageal reflux disease)     no meds at present    Heart murmur     murmur    Hyperlipidemia     Hypertension     Open toe wound 12/2020    right big toe open calus but is dry at present    Renal disorder     Shortness of breath     exertion    Sleep apnea     doesn't use cpap     Past Surgical History:   Procedure Laterality Date    AMPUTATION      ANGIOPLASTY  2017 5    BREAST EXCISIONAL BIOPSY Left     BREAST SURGERY      CARDIAC CATHETERIZATION      CARPAL TUNNEL RELEASE Bilateral     CERVICAL FUSION      HYSTERECTOMY  2008    KNEE SURGERY      OOPHORECTOMY  2008    GA EXC SKIN BENIG 3 1-4 CM REMAINDR BODY N/A 12/21/2020    Procedure: EXCISION SEBACEOUS CYST X 5 SCALP;  Surgeon: Basia Covarrubias MD;  Location:  MAIN OR;  Service: General    TOE AMPUTATION Left     TRIGGER FINGER RELEASE Right     4th Finger         Medications  Current Outpatient Medications   Medication Sig Dispense Refill    allopurinol (ZYLOPRIM) 300 mg tablet Take 1 tablet (300 mg total) by mouth daily 90 tablet 1    Aspirin Low Dose 81 MG EC tablet TAKE 1 TABLET (81 MG TOTAL) BY MOUTH ONCE FOR 1 DOSE 90 tablet 0    atorvastatin (LIPITOR) 40 mg tablet Take 1 tablet (40 mg total) by mouth daily 30 tablet 2    carvedilol (COREG) 25 mg tablet Take 1 tablet (25 mg total) by mouth 2 (two) times a day with meals 60 tablet 0    citalopram (CeleXA) 40 mg tablet Take 1 tablet (40 mg total) by mouth daily 30 tablet 2    clopidogrel (PLAVIX) 75 mg tablet Take 1 tablet (75 mg total) by mouth daily 30 tablet 2    Continuous Blood Gluc  (FreeStyle [place] 2 [place]) YOUNG Use as directed 1 each 0    Continuous Blood Gluc Sensor (FreeStyle [place] 14 Day Sensor) MISC USE 1 SENSOR EVERY 2 WEEKS 2 each 3    Diclofenac Sodium (VOLTAREN) 1 % Apply 2 g topically 4 (four) times a day 100 g 1    diphenhydrAMINE (BENADRYL) 25 mg capsule Take 25 mg by mouth every 4 (four) hours as needed for allergies or sleep       Dulaglutide 1 5 MG/0 5ML SOPN Inject 0 5 mL (1 5 mg total) under the skin once a week 4 pen 3    gabapentin (NEURONTIN) 600 MG tablet Take 1 tablet (600 mg total) by mouth 4 (four) times a day 120 tablet 2    glucose blood (OneTouch Ultra) test strip Use 1 each daily Use as instructed 100 each 2    HYDROcodone-acetaminophen (NORCO) 5-325 mg per tablet Take 1 tablet by mouth 2 (two) times a day as needed for painMax Daily Amount: 2 tablets 60 tablet 0    insulin glargine (Lantus SoloStar) 100 units/mL injection pen Inject 40 Units under the skin every 12 (twelve) hours 15 mL 3    insulin lispro (HumaLOG KwikPen) 100 units/mL injection pen Inject 10 Units under the skin 3 (three) times a day with meals If blood glucose >150 15 mL 5    Insulin Pen Needle (BD Pen Needle Micro U/F) 32G X 6 MM MISC Use 3 (three) times a day 100 each 5    Insulin Syringe-Needle U-100 (BD Veo Insulin Syringe U/F) 31G X 15/64" 0 5 ML MISC Inject under the skin 3 (three) times a day 100 each 2    lactulose (CHRONULAC) 10 g/15 mL solution Take 20 g by mouth daily at bedtime For elev Ammonia level      Lancets (OneTouch Delica Plus NLODHF11O) MISC USE TO TEST 3 TIMES DAILY 100 each 2    levothyroxine 50 mcg tablet TAKE 1 TABLET BY MOUTH EVERY DAY 60 tablet 0    lidocaine (LIDODERM) 5 % Apply 1 patch topically daily Remove & Discard patch within 12 hours or as directed by MD Salazar patch 1    mupirocin (BACTROBAN) 2 % ointment Apply topically daily 22 g 0    naloxone (NARCAN) 4 mg/0 1 mL nasal spray Administer 1 spray into a nostril  If breathing does not return to normal or if breathing difficulty resumes after 2-3 minutes, give another dose in the other nostril using a new spray  1 each 1    nitroglycerin (NITROSTAT) 0 4 mg SL tablet Place 1 tablet (0 4 mg total) under the tongue every 5 (five) minutes as needed for chest pain 25 tablet 1    OLANZapine (ZyPREXA) 5 mg tablet TAKE 1 TABLET BY MOUTH AT BEDTIME 90 tablet 0    oxybutynin (DITROPAN-XL) 10 MG 24 hr tablet Take 1 tablet (10 mg total) by mouth daily at bedtime 30 tablet 3    silver sulfadiazine (SILVADENE,SSD) 1 % cream Apply topically daily To burns on fingers, cover w/band-aid  50 g 0    SPS 15 GM/60ML suspension        No current facility-administered medications for this visit           Allergies   Allergen Reactions    Codeine      Other reaction(s): Nausea and Vomiting       Social History     Socioeconomic History    Marital status:      Spouse name: Not on file    Number of children: Not on file    Years of education: Not on file    Highest education level: Not on file   Occupational History    Not on file   Social Needs    Financial resource strain: Not on file    Food insecurity     Worry: Not on file     Inability: Not on file    Transportation needs     Medical: Not on file     Non-medical: Not on file   Tobacco Use    Smoking status: Former Smoker     Packs/day: 1 00     Years: 35 00     Pack years: 35 00     Types: Cigarettes     Quit date:      Years since quittin 4    Smokeless tobacco: Never Used   Substance and Sexual Activity    Alcohol use: Not Currently    Drug use: Never    Sexual activity: Not Currently   Lifestyle    Physical activity     Days per week: Not on file     Minutes per session: Not on file    Stress: Not on file   Relationships    Social connections     Talks on phone: Not on file     Gets together: Not on file     Attends Yazidi service: Not on file     Active member of club or organization: Not on file     Attends meetings of clubs or organizations: Not on file     Relationship status: Not on file    Intimate partner violence     Fear of current or ex partner: Not on file     Emotionally abused: Not on file     Physically abused: Not on file     Forced sexual activity: Not on file   Other Topics Concern    Not on file   Social History Narrative    Not on file        Family History   Problem Relation Age of Onset    Stroke Father     Heart disease Father     No Known Problems Mother     No Known Problems Sister     No Known Problems Daughter     No Known Problems Maternal Grandmother     No Known Problems Maternal Grandfather     No Known Problems Paternal Grandmother     No Known Problems Paternal Grandfather     No Known Problems Maternal Aunt     No Known Problems Maternal Aunt     No Known Problems Maternal Aunt     No Known Problems Maternal Aunt     No Known Problems Maternal Aunt     No Known Problems Maternal Aunt     No Known Problems Paternal Aunt        Lab Results   Component Value Date    WBC 10 30 06/04/2021    HGB 10 4 (L) 06/04/2021    HCT 30 6 (L) 06/04/2021    MCV 95 06/04/2021     06/04/2021      Lab Results   Component Value Date    SODIUM 141 06/04/2021    K 5 2 (H) 06/04/2021     (H) 06/04/2021    CO2 23 06/04/2021    BUN 54 (H) 06/04/2021    CREATININE 2 44 (H) 06/04/2021    GLUC 50 (L) 06/04/2021    CALCIUM 8 9 06/04/2021      Lab Results   Component Value Date    HGBA1C 9 3 (A) 03/10/2021      No results found for: CHOL  Lab Results   Component Value Date    HDL 45 12/18/2020     Lab Results   Component Value Date    LDLCALC 122 12/18/2020     Lab Results Component Value Date    TRIG 162 (H) 12/18/2020     No results found for: [place], [place]   Lab Results   Component Value Date    INR 0 93 05/26/2021    PROTIME 12 3 05/26/2021          Patient Active Problem List    Diagnosis Date Noted    [facility], [facility] IV [facility] 06/04/2021    Acute renal failure superimposed on stage 4 chronic kidney disease ([facility] ) 06/04/2021    Memory impairment 05/26/2021    Chronic bronchitis ([facility] ) 05/25/2021    Severe episode of recurrent major depressive disorder, without psychotic features ([facility] ) 05/25/2021    Skin ulcer of hand, limited to breakdown of skin ([facility] ) 02/25/2021    HTN (hypertension) 12/21/2020    Diabetic ulcer of toe of right foot associated with type 2 diabetes mellitus, with muscle involvement without evidence of necrosis ([facility] ) 11/25/2020    Head lump 11/11/2020    Need for follow-up by  11/11/2020    Normochromic normocytic anemia 09/09/2020    CKD (chronic kidney disease) stage 3, GFR 30-59 ml/min ([facility]) 09/09/2020    Abnormal LFTs 09/09/2020    S/P cervical spinal fusion 09/09/2020    Major depressive disorder 09/02/2020    Type 2 diabetes mellitus with diabetic polyneuropathy, with long-term current use of insulin ([facility] ) 09/02/2020    Anxiety 09/02/2020    CAD (coronary artery disease) 09/02/2020    Osteoarthritis of left knee 09/02/2020    Healthcare maintenance 09/02/2020    Cellulitis of finger of right hand 08/26/2020       Portions of the record may have been created with voice recognition software  Occasional wrong word or "sound a like" substitutions may have occurred due to the inherent limitations of voice recognition software  Read the chart carefully and recognize, using context, where substitutions have occurred      Emmy Hinton DO, [facility] [place]  6/10/2021 2:31 PM

## 2021-06-11 ENCOUNTER — PATIENT OUTREACH (OUTPATIENT)
Dept: FAMILY MEDICINE CLINIC | Facility: CLINIC | Age: 61
End: 2021-06-11

## 2021-06-11 NOTE — PROGRESS NOTES
Outgoing Call:  6/11/2021    CHW did call pt to discuss status of [facility] to have them call CHW and provide list of [address] [place] agencies  Pt stated that she did call and provided all of CHW's infromation  CHW did not receive a call  Pt does have an appointment scheduled with the [facility] on Monday and pt will be able to request the documents from her at that time  CHW did ask pt if she was able to request assistance form her daughter Best Meyer, to set up her [employer] account  Pt stated that she did not get around to it but would ask her daughter's boyfriend as he is better able to assist with this  Pt is seeking a code via email in order to complete setting up her [employer] account  Pt agreed to call [facility]W after her evaluation with [facility] on Monday  Next outreach is scheduled for 6/14/2021

## 2021-06-11 NOTE — PROGRESS NOTES
I received a call from the patient stating she needs to return to [place] to "finish up some business"  She notes the trip is not planned until September 18 and wanted to know if she would be approved to travel  I informed her that would be up to her specialists/PCP to provide clearance  I asked her not to worry or be concerned at this point since the trip is not for a few months and she agreed  The patient noted she saw Cardiology yesterday and the doctor is reportedly concerned about her cardiac status  She is to have a cardiac cath in July  The patient requests to speak with KATE Chaney; message sent to Edgardo Hamilton to call the patient

## 2021-06-13 DIAGNOSIS — E03.9 HYPOTHYROIDISM, UNSPECIFIED TYPE: ICD-10-CM

## 2021-06-13 RX ORDER — SODIUM CHLORIDE 9 MG/ML
125 INJECTION, SOLUTION INTRAVENOUS CONTINUOUS
Status: CANCELLED | OUTPATIENT
Start: 2021-06-13

## 2021-06-13 RX ORDER — ONDANSETRON 2 MG/ML
4 INJECTION INTRAMUSCULAR; INTRAVENOUS ONCE AS NEEDED
Status: CANCELLED | OUTPATIENT
Start: 2021-06-13

## 2021-06-13 RX ORDER — FENTANYL CITRATE/PF 50 MCG/ML
25 SYRINGE (ML) INJECTION
Status: CANCELLED | OUTPATIENT
Start: 2021-06-13

## 2021-06-14 ENCOUNTER — PATIENT OUTREACH (OUTPATIENT)
Dept: FAMILY MEDICINE CLINIC | Facility: CLINIC | Age: 61
End: 2021-06-14

## 2021-06-14 ENCOUNTER — TELEPHONE (OUTPATIENT)
Dept: SURGERY | Facility: HOSPITAL | Age: 61
End: 2021-06-14

## 2021-06-14 ENCOUNTER — TELEPHONE (OUTPATIENT)
Dept: RADIOLOGY | Facility: HOSPITAL | Age: 61
End: 2021-06-14

## 2021-06-14 DIAGNOSIS — I25.10 CORONARY ARTERY DISEASE INVOLVING NATIVE HEART WITHOUT ANGINA PECTORIS, UNSPECIFIED VESSEL OR LESION TYPE: ICD-10-CM

## 2021-06-14 DIAGNOSIS — Z79.4 CURRENT USE OF INSULIN (HCC): ICD-10-CM

## 2021-06-14 DIAGNOSIS — E11.42 TYPE 2 DIABETES MELLITUS WITH DIABETIC POLYNEUROPATHY, WITH LONG-TERM CURRENT USE OF INSULIN (HCC): ICD-10-CM

## 2021-06-14 DIAGNOSIS — Z79.4 TYPE 2 DIABETES MELLITUS WITH DIABETIC POLYNEUROPATHY, WITH LONG-TERM CURRENT USE OF INSULIN (HCC): ICD-10-CM

## 2021-06-14 DIAGNOSIS — N18.32 STAGE 3B CHRONIC KIDNEY DISEASE (HCC): ICD-10-CM

## 2021-06-14 RX ORDER — CITALOPRAM 40 MG/1
40 TABLET ORAL DAILY
Qty: 30 TABLET | Refills: 2 | Status: SHIPPED | OUTPATIENT
Start: 2021-06-14 | End: 2022-02-25

## 2021-06-14 RX ORDER — LEVOTHYROXINE SODIUM 0.05 MG/1
TABLET ORAL
Qty: 60 TABLET | Refills: 0 | Status: SHIPPED | OUTPATIENT
Start: 2021-06-14 | End: 2021-08-06

## 2021-06-14 NOTE — PROGRESS NOTES
Incoming / Gurvinder Hun:  6/14/2021    Morning:  CHW did receive a call from Donovan Gray, [employer], from [facility]  Donovan Gray was returning a call in reference to missing email with a list of [facility] agencies  Pt stated last week that she had not received it  Donovan Gray stated that she had sent the email to [facility], pt's daughter's email  CHW asked Donovan Gray if she would be able to provide a list to pt today when she visits with pt  She agreed to do this  CHW did speak to Donovan Gray in reference to care [facility] will be providing to pt  Donovan Gray did inform CHW that pt has the right to choose any person or agency as her provider  Donovan Gray will monitor pt's case and ensure that services are being rendered and benefiting the pt  Should there be a decline in pt's health due to negligence or lack of care, SC will address the matter with pt and pt's caretaker  If pt is not satisfied with services she could always request another caretaker/agency to provide her care  Afternoon:  CHW did speak with pt as she was on her way to [facility]  Pt was excited for her trip  CHW asked pt if she met with her [facility] and pt confirmed that she did  Pt was informed that she should know by end of week how many hours she has been approved for  Once this has been completed [facility] should be able to begin applying with [facility] agencies and request she be hired  Pt was given a list by SC, today  CHW asked pt if she was able to complete her MyChart setup and also her Five Wishes packet  Pt stated that she did complete MyChart and was actually able to view test results  Pt also stated that she did complete her Five Wishes packet and will drop it off to CHW this week  Next outreach is scheduled for 6/21/2021

## 2021-06-14 NOTE — PROGRESS NOTES
I received a call from the patient requesting a medication refill; will submit to PCP  The patient asked again about receiving the shingles vaccine; will send note to PCP  The patient states she received a call asking her to go in earlier tomorrow for her suprapubic catheter placement  She will be going in for 0600  She states her daughter and boyfriend will be providing transportation  I will continue to follow

## 2021-06-15 ENCOUNTER — ANESTHESIA (OUTPATIENT)
Dept: RADIOLOGY | Facility: HOSPITAL | Age: 61
End: 2021-06-15

## 2021-06-15 ENCOUNTER — HOSPITAL ENCOUNTER (OUTPATIENT)
Dept: RADIOLOGY | Facility: HOSPITAL | Age: 61
Discharge: HOME | End: 2021-06-15
Attending: RADIOLOGY | Admitting: RADIOLOGY
Payer: COMMERCIAL

## 2021-06-15 ENCOUNTER — ANESTHESIA EVENT (OUTPATIENT)
Dept: RADIOLOGY | Facility: HOSPITAL | Age: 61
End: 2021-06-15

## 2021-06-15 VITALS
DIASTOLIC BLOOD PRESSURE: 57 MMHG | HEART RATE: 71 BPM | TEMPERATURE: 97.6 F | OXYGEN SATURATION: 96 % | HEIGHT: 68 IN | SYSTOLIC BLOOD PRESSURE: 117 MMHG | WEIGHT: 293 LBS | RESPIRATION RATE: 18 BRPM | BODY MASS INDEX: 44.41 KG/M2

## 2021-06-15 DIAGNOSIS — R33.9 URINARY RETENTION: Primary | ICD-10-CM

## 2021-06-15 PROBLEM — Z95.5 HISTORY OF HEART ARTERY STENT: Status: ACTIVE | Noted: 2021-06-15

## 2021-06-15 PROBLEM — E66.01 MORBID OBESITY WITH BMI OF 45.0-49.9, ADULT (HCC): Status: ACTIVE | Noted: 2021-06-15

## 2021-06-15 LAB
GLUCOSE SERPL-MCNC: 114 MG/DL (ref 65–140)
GLUCOSE SERPL-MCNC: 156 MG/DL (ref 65–140)

## 2021-06-15 PROCEDURE — 82948 REAGENT STRIP/BLOOD GLUCOSE: CPT

## 2021-06-15 PROCEDURE — C1769 GUIDE WIRE: HCPCS

## 2021-06-15 PROCEDURE — 51102 DRAIN BL W/CATH INSERTION: CPT | Performed by: RADIOLOGY

## 2021-06-15 PROCEDURE — 51102 DRAIN BL W/CATH INSERTION: CPT

## 2021-06-15 PROCEDURE — 76942 ECHO GUIDE FOR BIOPSY: CPT | Performed by: RADIOLOGY

## 2021-06-15 PROCEDURE — C1725 CATH, TRANSLUMIN NON-LASER: HCPCS

## 2021-06-15 PROCEDURE — 76937 US GUIDE VASCULAR ACCESS: CPT

## 2021-06-15 RX ORDER — FENTANYL CITRATE/PF 50 MCG/ML
25 SYRINGE (ML) INJECTION
Status: DISCONTINUED | OUTPATIENT
Start: 2021-06-15 | End: 2021-06-15 | Stop reason: HOSPADM

## 2021-06-15 RX ORDER — PROPOFOL 10 MG/ML
INJECTION, EMULSION INTRAVENOUS AS NEEDED
Status: DISCONTINUED | OUTPATIENT
Start: 2021-06-15 | End: 2021-06-15

## 2021-06-15 RX ORDER — EPHEDRINE SULFATE 50 MG/ML
INJECTION INTRAVENOUS AS NEEDED
Status: DISCONTINUED | OUTPATIENT
Start: 2021-06-15 | End: 2021-06-15

## 2021-06-15 RX ORDER — SODIUM CHLORIDE 9 MG/ML
125 INJECTION, SOLUTION INTRAVENOUS CONTINUOUS
Status: DISCONTINUED | OUTPATIENT
Start: 2021-06-15 | End: 2021-06-16 | Stop reason: HOSPADM

## 2021-06-15 RX ORDER — LIDOCAINE HYDROCHLORIDE 20 MG/ML
INJECTION, SOLUTION EPIDURAL; INFILTRATION; INTRACAUDAL; PERINEURAL AS NEEDED
Status: DISCONTINUED | OUTPATIENT
Start: 2021-06-15 | End: 2021-06-15

## 2021-06-15 RX ORDER — MIDAZOLAM HYDROCHLORIDE 2 MG/2ML
INJECTION, SOLUTION INTRAMUSCULAR; INTRAVENOUS AS NEEDED
Status: DISCONTINUED | OUTPATIENT
Start: 2021-06-15 | End: 2021-06-15

## 2021-06-15 RX ORDER — ONDANSETRON 2 MG/ML
INJECTION INTRAMUSCULAR; INTRAVENOUS AS NEEDED
Status: DISCONTINUED | OUTPATIENT
Start: 2021-06-15 | End: 2021-06-15

## 2021-06-15 RX ORDER — SODIUM CHLORIDE 9 MG/ML
INJECTION, SOLUTION INTRAVENOUS CONTINUOUS PRN
Status: DISCONTINUED | OUTPATIENT
Start: 2021-06-15 | End: 2021-06-15

## 2021-06-15 RX ORDER — KETAMINE HYDROCHLORIDE 50 MG/ML
INJECTION, SOLUTION, CONCENTRATE INTRAMUSCULAR; INTRAVENOUS AS NEEDED
Status: DISCONTINUED | OUTPATIENT
Start: 2021-06-15 | End: 2021-06-15

## 2021-06-15 RX ORDER — ONDANSETRON 2 MG/ML
4 INJECTION INTRAMUSCULAR; INTRAVENOUS ONCE AS NEEDED
Status: DISCONTINUED | OUTPATIENT
Start: 2021-06-15 | End: 2021-06-15 | Stop reason: HOSPADM

## 2021-06-15 RX ADMIN — KETAMINE HYDROCHLORIDE 20 MG: 50 INJECTION INTRAMUSCULAR; INTRAVENOUS at 08:22

## 2021-06-15 RX ADMIN — PROPOFOL 200 MG: 10 INJECTION, EMULSION INTRAVENOUS at 07:50

## 2021-06-15 RX ADMIN — IOHEXOL 10 ML: 350 INJECTION, SOLUTION INTRAVENOUS at 08:10

## 2021-06-15 RX ADMIN — EPHEDRINE SULFATE 20 MG: 50 INJECTION, SOLUTION INTRAVENOUS at 07:54

## 2021-06-15 RX ADMIN — KETAMINE HYDROCHLORIDE 30 MG: 50 INJECTION INTRAMUSCULAR; INTRAVENOUS at 08:09

## 2021-06-15 RX ADMIN — SODIUM CHLORIDE: 0.9 INJECTION, SOLUTION INTRAVENOUS at 07:42

## 2021-06-15 RX ADMIN — MIDAZOLAM 2 MG: 1 INJECTION INTRAMUSCULAR; INTRAVENOUS at 08:06

## 2021-06-15 RX ADMIN — FENTANYL CITRATE 25 MCG: 50 INJECTION INTRAMUSCULAR; INTRAVENOUS at 08:56

## 2021-06-15 RX ADMIN — Medication 3000 MG: at 07:36

## 2021-06-15 RX ADMIN — SODIUM CHLORIDE 125 ML/HR: 0.9 INJECTION, SOLUTION INTRAVENOUS at 09:12

## 2021-06-15 RX ADMIN — EPHEDRINE SULFATE 50 MG: 50 INJECTION, SOLUTION INTRAVENOUS at 08:12

## 2021-06-15 RX ADMIN — LIDOCAINE HYDROCHLORIDE 100 MG: 20 INJECTION, SOLUTION EPIDURAL; INFILTRATION; INTRACAUDAL; PERINEURAL at 07:50

## 2021-06-15 RX ADMIN — FENTANYL CITRATE 25 MCG: 50 INJECTION INTRAMUSCULAR; INTRAVENOUS at 08:48

## 2021-06-15 RX ADMIN — CEFAZOLIN SODIUM 3000 MG: 10 INJECTION, POWDER, FOR SOLUTION INTRAVENOUS at 07:11

## 2021-06-15 RX ADMIN — EPHEDRINE SULFATE 10 MG: 50 INJECTION, SOLUTION INTRAVENOUS at 08:04

## 2021-06-15 RX ADMIN — SODIUM CHLORIDE 125 ML/HR: 0.9 INJECTION, SOLUTION INTRAVENOUS at 06:48

## 2021-06-15 RX ADMIN — EPHEDRINE SULFATE 20 MG: 50 INJECTION, SOLUTION INTRAVENOUS at 08:00

## 2021-06-15 RX ADMIN — ONDANSETRON 4 MG: 2 INJECTION INTRAMUSCULAR; INTRAVENOUS at 07:59

## 2021-06-15 NOTE — CASE MANAGEMENT
As requested by attending and patient Eric Fonseca referral completed to [facility]

## 2021-06-15 NOTE — INTERVAL H&P NOTE
Update: (This section must be completed if the H&P was completed greater than 24 hrs to procedure or admission)    H&P reviewed  After examining the patient, I find no changed to the H&P since it had been written  /70   Pulse 71   Temp 98 2 °F (36 8 °C) (Temporal)   Resp 16   Ht 5' 8" (1 727 m)   Wt 136 kg (300 lb)   SpO2 97%   BMI 45 61 kg/m²      Patient re-evaluated   Accept as history and physical     Milena Hyman MD/Lubna 15, 2021/8:31 AM

## 2021-06-15 NOTE — DISCHARGE INSTRUCTIONS
Procedural Sedation   WHAT YOU NEED TO KNOW:   Procedural sedation is medicine used during procedures to help you feel relaxed and calm  You will remember little to none of the procedure  After sedation you may feel tired, weak, or unsteady on your feet  You may also have trouble concentrating or short-term memory loss  These symptoms should go away in 24 hours or less  DISCHARGE INSTRUCTIONS:   Call 911 or have someone else call for any of the following:   · You have sudden trouble breathing      · You cannot be woken  ·    Contact Interventional Radiology at [phone] PATIENTS: Contact Interventional Radiology at [phone] [facility] PATIENTS: Contact Interventional Radiology at [phone]) if any of the following occur:      · You have a severe headache or dizziness      · Your heart is beating faster than usual     · You have a fever or chills      · Your skin is itchy, swollen, or you have a rash      · You have nausea or are vomiting for more than 8 hours after the procedure       · You have questions or concerns about your condition or care  Self-care:   · Have someone stay with you for 24 hours  This person can drive you to errands and help you do things around the house  This person can also watch for problems       · Rest and do quiet activities for 24 hours  Do not exercise, ride a bike, or play sports  Stand up slowly to prevent dizziness and falls  Take short walks around the house with another person  Slowly return to your usual activities the next day       · Do not drive or use dangerous machines or tools for 24 hours  You may injure yourself or others  Examples include a lawnmower, saw, or drill  Do not return to work for 24 hours if you use dangerous machines or tools for work       · Do not make important decisions for 24 hours  For example, do not sign important papers or invest money       · Drink liquids as directed  Liquids help flush the sedation medicine out of your body  Ask how much liquid to drink each day and which liquids are best for you       · Eat small, frequent meals to prevent nausea and vomiting  Start with clear liquids such as juice or broth  If you do not vomit after clear liquids, you can eat your usual foods       · Do not drink alcohol or take medicines that make you drowsy  This includes medicines that help you sleep and anxiety medicines  Ask your healthcare provider if it is safe for you to take pain medicine  Follow up with your healthcare provider as directed: Write down your questions so you remember to ask them during your visits  Suprapubic Cystostomy     WHAT YOU NEED TO KNOW:   Suprapubic cystostomy is surgery to create a stoma (opening) through your abdomen into your bladder  This opening is where a catheter is inserted to drain urine  You may need a cystostomy if your urine flow is blocked  DISCHARGE INSTRUCTIONS:   Resume your normal diet  Small sips of flat soda will help with mild nausea  Follow up with your healthcare provider as directed: You may need to return to have your suprapubic catheter changed or removed  Write down your questions so you remember to ask them during your visits  Empty your urine drainage bag: Empty your urine drainage bag when it is ½ to ? full, or every 8 hours  If you have a smaller leg bag, empty it every 3 to 4 hours  Do the following when you empty your urine drainage bag:  · Hold the urine bag over a toilet or large container  · Remove the drain spout from its sleeve at the bottom of the urine bag  Do not touch the tip of the drain spout  Open the slide valve on the spout  · Let the urine flow out of the urine bag into the toilet or container  Do not let the drainage tube touch anything  · Clean the end of the drain spout with alcohol when the bag is empty  Ask which cleaning solution is best to use  · Close the slide valve and put the drain spout into its sleeve at the bottom of the urine bag  Write down how much urine was in your bag if you were asked to keep a record  Prevent an infection:   · Clean the stoma and skin around it daily  Wash your hands before and after cystostomy care  Put on a new pair of clean medical gloves  · Change your urine bag or clean reusable bags  Ask how often you need to change or clean your urine drainage bag  You may need to change your reusable bag at least once a week  · Keep the bag below your waist  This will prevent urine from flowing back into your bladder and causing an infection or other problems  Also, keep the tube free of kinks so the urine will drain properly  Do not pull on the catheter  This can cause pain and bleeding and may cause the catheter to come out  Contact Interventional Radiology at [phone] [place] PATIENTS: Contact Interventional Radiology at [phone]) Laura Santiago PATIENTS: Contact Interventional Radiology at [phone]) if any of the following occur:  · You have a fever or chills  · Persistent nausea or vomiting  · You have severe pain  · You have a burning pain in your stoma  · Your stoma is red, swollen, or draining pus  · You have blood in your urine  · You have questions or concerns about your condition or care    Seek care immediately or call 911 if:   · No urine is draining into the urine bag

## 2021-06-15 NOTE — BRIEF OP NOTE (RAD/CATH)
INTERVENTIONAL RADIOLOGY PROCEDURE NOTE    Date: 6/15/2021    Procedure: IR SUPRAPUBIC CATHETER PLACEMENT    Preoperative diagnosis:   1  Urinary retention         Postoperative diagnosis: Same  Surgeon: Milena Hymna MD     Assistant: None  No qualified resident was available  Blood loss: none    Specimens: none     Findings: 16 Fr suprapubic catheter inserted    Complications: None immediate      Anesthesia: local and general

## 2021-06-17 ENCOUNTER — DOCUMENTATION (OUTPATIENT)
Dept: SOCIAL WORK | Facility: HOSPITAL | Age: 61
End: 2021-06-17

## 2021-06-18 ENCOUNTER — TELEPHONE (OUTPATIENT)
Dept: UROLOGY | Facility: MEDICAL CENTER | Age: 61
End: 2021-06-18

## 2021-06-18 NOTE — PROGRESS NOTES
Admission Report at [facility] has admitted your patient to [facility] service with the following disciplines:      SN  This report is informational only, no responses is needed  Primary focus of home health care sp catheter management  diabetes management  Patient stated goals of care I would like to be more independent knowing I have this catheter the rest of my life I would like to be able to take care of it and not to have infections  Anticipated visit pattern and next visit date 2x/week then 1x/week 6 22 21  Significant clinical findings pt not consisitent with blood sugar checks   Request for additional disciplines none  Request for medication clarification pt has all medications in home  Request for other order clarifications no current orders for VNA to change sp catheter  Will need to get orders for catheter change frequency from urology  Needs follow up physician appointments scheduled all follow up appts made  Potential barriers to goal achievement memory impairment  Other pertinent information    Thank you for allowing us to participate in the care of your patient        Rashmi Malin RN

## 2021-06-18 NOTE — TELEPHONE ENCOUNTER
Pt managed by Dr Taavrez [facility], [facility] calling for orders,pt had recent IR sp catheter placement,please contact her

## 2021-06-21 ENCOUNTER — PATIENT OUTREACH (OUTPATIENT)
Dept: FAMILY MEDICINE CLINIC | Facility: CLINIC | Age: 61
End: 2021-06-21

## 2021-06-21 NOTE — PROGRESS NOTES
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  CHW did explain what she should expect moving forward with them and what the responsibilities as an HHA worker entail  CHW also informed her that she will be required to complete a TB test before being officially hired through them  Yenny expressed understanding  Pt informed CHW that she will be leaving to [place] at end of September for one week and [employer] [employer] [place] will be present with her  Pt asked if [employer] [place] [employer] [place] will still be paid for services  CHW informed her she would need to directly ask this at her interview tomorrow as CHW is not sure of this  CHW informed pt she will reach out at end of week to see if pt has dropped off Five Wishes packet  Next outreach is scheduled for 6/25/2001

## 2021-06-22 ENCOUNTER — PATIENT OUTREACH (OUTPATIENT)
Dept: FAMILY MEDICINE CLINIC | Facility: CLINIC | Age: 61
End: 2021-06-22

## 2021-06-22 NOTE — PROGRESS NOTES
I called the patient to remind her of her Neph appointment tomorrow; she was aware and plans on attending  She notes her catheterization went well  VNA was at her home today and will be seeing the patient once a week for a few weeks  The patient denies any fever or skin changes at the site of the catheter  The patient could not remember what her blood sugar was this morning but believes it was in the 100's  I informed the patient that I mailed her the order for the shingles vaccine which she can take to a local pharmacy  I will continue to follow

## 2021-06-23 ENCOUNTER — CONSULT (OUTPATIENT)
Dept: NEPHROLOGY | Facility: CLINIC | Age: 61
End: 2021-06-23
Payer: COMMERCIAL

## 2021-06-23 VITALS
HEART RATE: 70 BPM | HEIGHT: 68 IN | SYSTOLIC BLOOD PRESSURE: 132 MMHG | WEIGHT: 291 LBS | DIASTOLIC BLOOD PRESSURE: 78 MMHG | BODY MASS INDEX: 44.1 KG/M2 | RESPIRATION RATE: 18 BRPM

## 2021-06-23 DIAGNOSIS — N18.4 ACUTE RENAL FAILURE SUPERIMPOSED ON STAGE 4 CHRONIC KIDNEY DISEASE, UNSPECIFIED ACUTE RENAL FAILURE TYPE (HCC): Primary | ICD-10-CM

## 2021-06-23 DIAGNOSIS — N17.9 ACUTE RENAL FAILURE SUPERIMPOSED ON STAGE 4 CHRONIC KIDNEY DISEASE, UNSPECIFIED ACUTE RENAL FAILURE TYPE (HCC): Primary | ICD-10-CM

## 2021-06-23 DIAGNOSIS — N18.9 CHRONIC KIDNEY DISEASE, UNSPECIFIED CKD STAGE: ICD-10-CM

## 2021-06-23 DIAGNOSIS — N17.9 ACUTE RENAL FAILURE, UNSPECIFIED ACUTE RENAL FAILURE TYPE (HCC): ICD-10-CM

## 2021-06-23 DIAGNOSIS — N18.32 STAGE 3B CHRONIC KIDNEY DISEASE (HCC): ICD-10-CM

## 2021-06-23 LAB
SL AMB  POCT GLUCOSE, UA: NEGATIVE
SL AMB LEUKOCYTE ESTERASE,UA: ABNORMAL
SL AMB POCT BILIRUBIN,UA: NEGATIVE
SL AMB POCT BLOOD,UA: NEGATIVE
SL AMB POCT CLARITY,UA: CLEAR
SL AMB POCT COLOR,UA: YELLOW
SL AMB POCT KETONES,UA: NEGATIVE
SL AMB POCT NITRITE,UA: NEGATIVE
SL AMB POCT PH,UA: 6
SL AMB POCT SPECIFIC GRAVITY,UA: 1.01
SL AMB POCT URINE PROTEIN: ABNORMAL
SL AMB POCT UROBILINOGEN: 0.2

## 2021-06-23 PROCEDURE — 81002 URINALYSIS NONAUTO W/O SCOPE: CPT | Performed by: INTERNAL MEDICINE

## 2021-06-23 PROCEDURE — 99204 OFFICE O/P NEW MOD 45 MIN: CPT | Performed by: INTERNAL MEDICINE

## 2021-06-23 NOTE — PROGRESS NOTES
Consultation - Nephrology   Reilly Reyes 64 y o  female MRN: [id number]  Unit/Bed#:  Encounter: [id number]      Assessment/Plan     Assessment / Plan:  1  Renal    The patient is chronic kidney disease and was living in another [place] until about a year ago  In December of last year her creatinine was 1 4 and then in February of this year was 1 7  Reviewing her labs as well as talking to her she had significant proteinuria in the past and she is a diabetic so I suspect she has underlying diabetic nephropathy  Then in May her creatinine increased to 2 6  Somewhere in that time frame she was discovered to have urine retention and large postvoid residuals she had a [place] catheter in and now has a suprapubic tube in  Her last creatinine was 2 4  The question is whether not the bladder outlet dysfunction had something to do with increased creatinine or whether not this represents progression of her underlying kidney disease  One big difference is that her protein estimation had increased significantly to over 8 g so if the underlying intrinsic process has worsened it is possible she may have had a more rapid decline in kidney function resulting in increasing her creatinine  I am glad to see that between May and June it stabilized  I am going to give her slip to repeat labs in a couple weeks to see if the creatinine is improved now the suprapubic tube is placed  In talking to her and her daughter she had been on lisinopril in the past but this was held when they noticed the creatinine was elevated and for now I would continue to hold it  At some point once we determine her true baseline creatinine a likely will introduce that class of medication to help reduce proteinuria to delay progression  In the meantime I told her to focus on sugar control blood pressure control which is excellent she is on treatment for cholesterol      Will check SPEP, UPEP  Repeat BMP  Prior to visit will check urine protein to creatinine ratio and BMP as well              History of Present Illness   Physician Requesting Consult: No att  providers found  Reason for Consult / Principal Problem:  Acute on chronic kidney disease  Hx and PE limited by:   HPI: Rhys Villatoro is a 64y o  year old female who presents for her 1st visit  The patient is a longstanding diabetic and also has history of ischemic heart disease with stents placed in the past   She moved to this state last August and was noted that her creatinine had increased above her baseline over the last 5 months or so and she is here for evaluation  History obtained from chart review and the patient and her daughter who was present for the visit  Consults    Review of Systems   Constitutional: Negative for chills, diaphoresis, fatigue and fever  HENT: Negative  Eyes: Negative  Respiratory: Negative  Negative for cough, chest tightness, shortness of breath and wheezing  Cardiovascular: Negative  Negative for chest pain, palpitations and leg swelling  Gastrointestinal: Negative  Negative for abdominal distention, abdominal pain, diarrhea, nausea and vomiting  Genitourinary: Negative for hematuria  Suprapubic tube in place   Neurological: Negative  Negative for dizziness, syncope, weakness and headaches  Psychiatric/Behavioral: Negative  Negative for agitation, behavioral problems, confusion and decreased concentration         Historical Information   Patient Active Problem List   Diagnosis    Cellulitis of finger of right hand    Major depressive disorder    Type 2 diabetes mellitus with diabetic polyneuropathy, with long-term current use of insulin ([facility])    Anxiety    CAD (coronary artery disease)    Osteoarthritis of left knee    Healthcare maintenance    Normochromic normocytic anemia    CKD (chronic kidney disease) stage 3, GFR 30-59 ml/min ([facility])    Abnormal LFTs    S/P cervical spinal fusion    Head lump    Need for follow-up by     Diabetic ulcer of toe of right foot associated with type 2 diabetes mellitus, with muscle involvement without evidence of necrosis ([place] [address] )    HTN (hypertension)    Skin ulcer of hand, limited to breakdown of skin ([place] [address] )    Chronic bronchitis ([place] [address] )    Severe episode of recurrent major depressive disorder, without psychotic features ([place] [address] )    Memory impairment    Morbid obesity with BMI of 45 0-49 9, adult ([place] [address] )    History of heart artery stent    ARF (acute renal failure) ([place] [address] )     Past Medical History:   Diagnosis Date    Abnormal liver function     Anemia     Anxiety     Arthritis     Chronic kidney disease     stage 3    Chronic narcotic dependence (HCC)     Chronic pain disorder     lower back, hands , neck and knees    Coronary artery disease     Depression     Diabetes mellitus ([place] [address] )     GERD (gastroesophageal reflux disease)     no meds at present    Heart murmur     murmur    Hyperlipidemia     Hypertension     Open toe wound 12/2020    right big toe open calus but is dry at present    Renal disorder     Shortness of breath     exertion    Sleep apnea     doesn't use cpap     Past Surgical History:   Procedure Laterality Date    AMPUTATION      ANGIOPLASTY  2017    5    BREAST EXCISIONAL BIOPSY Left     BREAST SURGERY      CARDIAC CATHETERIZATION      CARPAL TUNNEL RELEASE Bilateral     CERVICAL FUSION      HYSTERECTOMY  2008    IR SUPRAPUBIC CATHETER PLACEMENT  6/15/2021    KNEE SURGERY      OOPHORECTOMY  2008    TN EXC SKIN BENIG 3 1-4 CM REMAINDR BODY N/A 12/21/2020    Procedure: EXCISION SEBACEOUS CYST X 5 SCALP;  Surgeon: Brett Concecpion MD;  Location:  MAIN OR;  Service: General    TOE AMPUTATION Left     TRIGGER FINGER RELEASE Right     4th Finger     Social History   Social History     Substance and Sexual Activity   Alcohol Use Not Currently     Social History     Substance and Sexual Activity   Drug Use Never     Social History     Tobacco Use Smoking Status Former Smoker    Packs/day: 1 00    Years: 35 00    Pack years: 35 00    Types: Cigarettes    Quit date:     Years since quittin 4   Smokeless Tobacco Never Used     Family History   Problem Relation Age of Onset    Stroke Father     Heart disease Father     No Known Problems Mother     No Known Problems Sister     No Known Problems Daughter     No Known Problems Maternal Grandmother     No Known Problems Maternal Grandfather     No Known Problems Paternal Grandmother     No Known Problems Paternal Grandfather     No Known Problems Maternal Aunt     No Known Problems Maternal Aunt     No Known Problems Maternal Aunt     No Known Problems Maternal Aunt     No Known Problems Maternal Aunt     No Known Problems Maternal Aunt     No Known Problems Paternal Aunt        Meds/Allergies   current meds:   No current facility-administered medications for this visit  Allergies   Allergen Reactions    Codeine      Other reaction(s): Nausea and Vomiting       Objective   [unfilled]  Body mass index is 44 25 kg/m²  Invasive Devices:        PHYSICAL EXAM:  /78 (BP Location: Left arm, Patient Position: Sitting, Cuff Size: Standard)   Pulse 70   Resp 18   Ht 5' 8" (1 727 m)   Wt 132 kg (291 lb)   BMI 44 25 kg/m²     Physical Exam  Constitutional:       General: She is not in acute distress  Appearance: She is not toxic-appearing or diaphoretic  HENT:      Head: Normocephalic and atraumatic  Nose:      Comments: No mask     Mouth/Throat:      Comments: No mask  Eyes:      General: No scleral icterus  Extraocular Movements: Extraocular movements intact  Cardiovascular:      Rate and Rhythm: Normal rate and regular rhythm  Heart sounds: No friction rub  No gallop  Comments: Mild edema  Pulmonary:      Effort: Pulmonary effort is normal  No respiratory distress  Breath sounds: Normal breath sounds  No wheezing, rhonchi or rales  Abdominal:      General: Bowel sounds are normal  There is no distension  Palpations: Abdomen is soft  Tenderness: There is no abdominal tenderness  There is no rebound  Musculoskeletal:      Cervical back: Normal range of motion and neck supple  Neurological:      General: No focal deficit present  Mental Status: She is alert and oriented to person, place, and time  Mental status is at baseline  Psychiatric:         Mood and Affect: Mood normal          Behavior: Behavior normal          Thought Content:  Thought content normal          Judgment: Judgment normal            Current Weight: Weight - Scale: 132 kg (291 lb)  First Weight: Weight - Scale: 132 kg (291 lb)    Lab Results:              Invalid input(s): LABGLOM        Invalid input(s): LABALBU

## 2021-06-23 NOTE — TELEPHONE ENCOUNTER
Patient had SPT placed on 6/15/21  Patient will be due to for first SPT change on or after 7/27/21  Returned call to Jose Appiah with [facility]  Advised SPT was just placed last week, catheter should remain in place a last 6 weeks to ensure track healing  Provided verbal orders for irrigation as needed and if catheter would become blocked and unable to irrigate prior to that 6 week matias, to place urethral catheter  Advised, first change needs to be done in the office, but after that first change, can be managed by VNA if they will still be seeing the patient going forward  Jose Appiah verbalized understanding  Requested written orders also be faxed to [facility]  Left message for patient to return call to schedule first SPT change in the office

## 2021-06-23 NOTE — LETTER
June 23, 2021     Kenny Patino, [address]  [address] [facility] [address]    Patient: Umair Montaño   YOB: 1960   Date of Visit: 6/23/2021       Dear Dr Kayode Maldonado: Thank you for referring Umair Montaño to me for evaluation  Below are my notes for this consultation  If you have questions, please do not hesitate to call me  I look forward to following your patient along with you  Sincerely,        Hung Au MD        CC: No Recipients  Hung Au MD  6/23/2021 12:26 PM  Sign when Signing Visit  Consultation - Nephrology   Umair Montaño 64 y o  female MRN: [id number]  Unit/Bed#:  Encounter: [id number]      Assessment/Plan     Assessment / Plan:  1  Renal    The patient is chronic kidney disease and [place] about [place]  In December of last year her creatinine was 1 4 and then in February of this year was 1 7  Reviewing her labs as well as talking to her she had significant proteinuria in the past and she is a diabetic so I suspect she has underlying diabetic nephropathy  Then in May her creatinine increased to 2 6  Somewhere in that time frame she was discovered to have urine retention and large postvoid residuals she had a [place] catheter in and now has a suprapubic tube in  Her last creatinine was 2 4  The question is whether not the bladder outlet dysfunction had something to do with increased creatinine or whether not this represents progression of her underlying kidney disease  One big difference is that her protein estimation had increased significantly to over 8 g so if the underlying intrinsic process has worsened it is possible she may have had a more rapid decline in kidney function resulting in increasing her creatinine  I am glad to see that between May and June it stabilized  I am going to give her slip to repeat labs in a couple weeks to see if the creatinine is improved now the suprapubic tube is placed      In talking to her and her daughter she had been on lisinopril in the past but this was held when they noticed the creatinine was elevated and for now I would continue to hold it  At some point once we determine her true baseline creatinine a likely will introduce that class of medication to help reduce proteinuria to delay progression  In the meantime I told her to focus on sugar control blood pressure control which is excellent she is on treatment for cholesterol  Will check SPEP, UPEP  Repeat BMP  Prior to visit will check urine protein to creatinine ratio and BMP as well              History of Present Illness   Physician Requesting Consult: No att  providers found  Reason for Consult / Principal Problem:  Acute on chronic kidney disease  Hx and PE limited by:   HPI: Narda Charles is a 64y o  year old female who presents for her 1st visit  The patient is a longstanding diabetic and also has history of ischemic heart disease with stents placed in the past   She moved to this state last August and was noted that her creatinine had increased above her baseline over the last 5 months or so and she is here for evaluation  History obtained from chart review and the patient and her daughter who was present for the visit  Consults    Review of Systems   Constitutional: Negative for chills, diaphoresis, fatigue and fever  HENT: Negative  Eyes: Negative  Respiratory: Negative  Negative for cough, chest tightness, shortness of breath and wheezing  Cardiovascular: Negative  Negative for chest pain, palpitations and leg swelling  Gastrointestinal: Negative  Negative for abdominal distention, abdominal pain, diarrhea, nausea and vomiting  Genitourinary: Negative for hematuria  Suprapubic tube in place   Neurological: Negative  Negative for dizziness, syncope, weakness and headaches  Psychiatric/Behavioral: Negative    Negative for agitation, behavioral problems, confusion and decreased concentration         Historical Information   Patient Active Problem List   Diagnosis    Cellulitis of finger of right hand    Major depressive disorder    Type 2 diabetes mellitus with diabetic polyneuropathy, with long-term current use of insulin ([facility])    Anxiety    CAD (coronary artery disease)    Osteoarthritis of left knee    Healthcare maintenance    Normochromic normocytic anemia    CKD (chronic kidney disease) stage 3, GFR 30-59 ml/min ([facility])    Abnormal LFTs    S/P cervical spinal fusion    Head lump    Need for follow-up by     Diabetic ulcer of toe of right foot associated with type 2 diabetes mellitus, with muscle involvement without evidence of necrosis ([address] )    HTN (hypertension)    Skin ulcer of hand, limited to breakdown of skin ([facility])    Chronic bronchitis ([address] )    Severe episode of recurrent major depressive disorder, without psychotic features ([address] )    Memory impairment    Morbid obesity with BMI of 45 0-49 9, adult ([address] )    History of heart artery stent    ARF (acute renal failure) ([address] )     Past Medical History:   Diagnosis Date    Abnormal liver function     Anemia     Anxiety     Arthritis     Chronic kidney disease     stage 3    Chronic narcotic dependence ([facility])     Chronic pain disorder     lower back, hands , neck and knees    Coronary artery disease     Depression     Diabetes mellitus ([address] )     GERD (gastroesophageal reflux disease)     no meds at present    Heart murmur     murmur    Hyperlipidemia     Hypertension     Open toe wound 12/2020    right big toe open calus but is dry at present    Renal disorder     Shortness of breath     exertion    Sleep apnea     doesn't use cpap     Past Surgical History:   Procedure Laterality Date    AMPUTATION      ANGIOPLASTY  2017    5    BREAST EXCISIONAL BIOPSY Left     BREAST SURGERY      CARDIAC CATHETERIZATION      CARPAL TUNNEL RELEASE Bilateral     CERVICAL FUSION      HYSTERECTOMY 2008    IR SUPRAPUBIC CATHETER PLACEMENT  6/15/2021    KNEE SURGERY      OOPHORECTOMY  2008    CT EXC SKIN BENIG 3 1-4 CM REMAINDR BODY N/A 2020    Procedure: EXCISION SEBACEOUS CYST X 5 SCALP;  Surgeon: Rosie Short MD;  Location:  MAIN OR;  Service: General    TOE AMPUTATION Left     TRIGGER FINGER RELEASE Right     4th Finger     Social History   Social History     Substance and Sexual Activity   Alcohol Use Not Currently     Social History     Substance and Sexual Activity   Drug Use Never     Social History     Tobacco Use   Smoking Status Former Smoker    Packs/day: 1 00    Years: 35 00    Pack years: 35 00    Types: Cigarettes    Quit date:     Years since quittin 4   Smokeless Tobacco Never Used     Family History   Problem Relation Age of Onset    Stroke Father     Heart disease Father     No Known Problems Mother     No Known Problems Sister     No Known Problems Daughter     No Known Problems Maternal Grandmother     No Known Problems Maternal Grandfather     No Known Problems Paternal Grandmother     No Known Problems Paternal Grandfather     No Known Problems Maternal Aunt     No Known Problems Maternal Aunt     No Known Problems Maternal Aunt     No Known Problems Maternal Aunt     No Known Problems Maternal Aunt     No Known Problems Maternal Aunt     No Known Problems Paternal Aunt        Meds/Allergies   current meds:   No current facility-administered medications for this visit  Allergies   Allergen Reactions    Codeine      Other reaction(s): Nausea and Vomiting       Objective   [unfilled]  Body mass index is 44 25 kg/m²  Invasive Devices:        PHYSICAL EXAM:  /78 (BP Location: Left arm, Patient Position: Sitting, Cuff Size: Standard)   Pulse 70   Resp 18   Ht 5' 8" (1 727 m)   Wt 132 kg (291 lb)   BMI 44 25 kg/m²     Physical Exam  Constitutional:       General: She is not in acute distress       Appearance: She is not toxic-appearing or diaphoretic  HENT:      Head: Normocephalic and atraumatic  Nose:      Comments: No mask     Mouth/Throat:      Comments: No mask  Eyes:      General: No scleral icterus  Extraocular Movements: Extraocular movements intact  Cardiovascular:      Rate and Rhythm: Normal rate and regular rhythm  Heart sounds: No friction rub  No gallop  Comments: Mild edema  Pulmonary:      Effort: Pulmonary effort is normal  No respiratory distress  Breath sounds: Normal breath sounds  No wheezing, rhonchi or rales  Abdominal:      General: Bowel sounds are normal  There is no distension  Palpations: Abdomen is soft  Tenderness: There is no abdominal tenderness  There is no rebound  Musculoskeletal:      Cervical back: Normal range of motion and neck supple  Neurological:      General: No focal deficit present  Mental Status: She is alert and oriented to person, place, and time  Mental status is at baseline  Psychiatric:         Mood and Affect: Mood normal          Behavior: Behavior normal          Thought Content:  Thought content normal          Judgment: Judgment normal            Current Weight: Weight - Scale: 132 kg (291 lb)  First Weight: Weight - Scale: 132 kg (291 lb)    Lab Results:              Invalid input(s): LABGLOM        Invalid input(s): LABALBU

## 2021-06-23 NOTE — TELEPHONE ENCOUNTER
Patient returned call   Scheduled for 7/29/21 at 1:00 in the [address] [facility] office for first SPT change

## 2021-06-25 ENCOUNTER — PATIENT OUTREACH (OUTPATIENT)
Dept: FAMILY MEDICINE CLINIC | Facility: CLINIC | Age: 61
End: 2021-06-25

## 2021-06-25 NOTE — PROGRESS NOTES
Outgoing Call:  6/25/2021    CHW did call pt to discuss status of waiver services  Pt stated that her daughter Galina Mcrae had her TB test completed and now needs to work on her completing FBI finger prints and Child Abuse Clearance  CHW informed her that she will need to have this completed before [facility] will hire her  Yenny expressed understanding  CHW did ask pt if she was able to complete her [employer] W[employer] packet and drop it off  Pt stated that it is completed and will drop it off on Monday after her CT scan at [facility]  Next outreach is scheduled for 6/28/2021

## 2021-06-26 DIAGNOSIS — N18.32 STAGE 3B CHRONIC KIDNEY DISEASE (HCC): ICD-10-CM

## 2021-06-26 DIAGNOSIS — Z79.4 CURRENT USE OF INSULIN (HCC): ICD-10-CM

## 2021-06-26 DIAGNOSIS — Z79.4 TYPE 2 DIABETES MELLITUS WITH DIABETIC POLYNEUROPATHY, WITH LONG-TERM CURRENT USE OF INSULIN (HCC): ICD-10-CM

## 2021-06-26 DIAGNOSIS — E11.42 TYPE 2 DIABETES MELLITUS WITH DIABETIC POLYNEUROPATHY, WITH LONG-TERM CURRENT USE OF INSULIN (HCC): ICD-10-CM

## 2021-06-26 DIAGNOSIS — I25.10 CORONARY ARTERY DISEASE INVOLVING NATIVE HEART WITHOUT ANGINA PECTORIS, UNSPECIFIED VESSEL OR LESION TYPE: ICD-10-CM

## 2021-06-28 ENCOUNTER — HOSPITAL ENCOUNTER (OUTPATIENT)
Dept: CT IMAGING | Facility: HOSPITAL | Age: 61
Discharge: HOME | End: 2021-06-28
Payer: COMMERCIAL

## 2021-06-28 ENCOUNTER — PATIENT OUTREACH (OUTPATIENT)
Dept: FAMILY MEDICINE CLINIC | Facility: CLINIC | Age: 61
End: 2021-06-28

## 2021-06-28 DIAGNOSIS — E27.9 LESION OF ADRENAL GLAND (HCC): ICD-10-CM

## 2021-06-28 DIAGNOSIS — R26.89 BALANCE DISORDER: ICD-10-CM

## 2021-06-28 DIAGNOSIS — R42 DIZZINESS: ICD-10-CM

## 2021-06-28 DIAGNOSIS — R51.9 INTRACTABLE HEADACHE, UNSPECIFIED CHRONICITY PATTERN, UNSPECIFIED HEADACHE TYPE: ICD-10-CM

## 2021-06-28 DIAGNOSIS — E27.8 ADRENAL NODULE (HCC): ICD-10-CM

## 2021-06-28 PROCEDURE — G1004 CDSM NDSC: HCPCS

## 2021-06-28 PROCEDURE — 74170 CT ABD WO CNTRST FLWD CNTRST: CPT

## 2021-06-28 PROCEDURE — 70450 CT HEAD/BRAIN W/O DYE: CPT

## 2021-06-28 RX ORDER — CARVEDILOL 25 MG/1
TABLET ORAL
Qty: 60 TABLET | Refills: 0 | Status: SHIPPED | OUTPATIENT
Start: 2021-06-28 | End: 2021-07-21

## 2021-06-28 RX ADMIN — IOHEXOL 100 ML: 350 INJECTION, SOLUTION INTRAVENOUS at 16:00

## 2021-06-28 NOTE — PROGRESS NOTES
Outgoing Call:  6/28/2021    CHW did call pt as she was leaving [facility]  Pt had completed her CT Scan  CHW did ask pt if she would be dropping off her Five Wishes packet  Pt stated that her daughter forgot to remind her  Pt stated that she will call CHW this week and drop off at her next appointment  Next outreach is scheduled for 7/6/2021

## 2021-06-29 ENCOUNTER — TELEPHONE (OUTPATIENT)
Dept: FAMILY MEDICINE CLINIC | Facility: CLINIC | Age: 61
End: 2021-06-29

## 2021-07-01 ENCOUNTER — TELEPHONE (OUTPATIENT)
Dept: FAMILY MEDICINE CLINIC | Facility: CLINIC | Age: 61
End: 2021-07-01

## 2021-07-01 NOTE — TELEPHONE ENCOUNTER
[facility] LM stating pt BP is 172/88, she has a slight HA and has been taking 4 benadryl every night to help with her stuffy nose  Taking other meds as directed    Spoke to pt, she states she's been doing that for years  I advised her not to take more than 50mg at a time but if she's needing to take that many to get relief to sleep, we should look at other treatments she could try (nasal spray, etc)  She has no other allergy meds on board  I requested she check her BP a little bit later and call us with the reading  ER precautions given

## 2021-07-02 ENCOUNTER — TELEPHONE (OUTPATIENT)
Dept: FAMILY MEDICINE CLINIC | Facility: CLINIC | Age: 61
End: 2021-07-02

## 2021-07-02 NOTE — TELEPHONE ENCOUNTER
SIGNATURES NEEDED FOR WOUND CARE ORDER  FORM RECEIVED VIA FAX  WILL BE PLACED IN FORM BIN FOR MARGARET Mccormack

## 2021-07-06 ENCOUNTER — PATIENT OUTREACH (OUTPATIENT)
Dept: FAMILY MEDICINE CLINIC | Facility: CLINIC | Age: 61
End: 2021-07-06

## 2021-07-06 NOTE — PROGRESS NOTES
Outgoing Call Chart Review:  7/6/2021    CHW did call pt and discussed status of pt's Five Wishes packet  Pt stated that she will drop it off tomorrow  CHW reviewed goals accomplished while working with pt  Pt agreed needs have been met for this referral  Transportation has been secured with [employer] and also with [employer] Medicare Complete  Waiver program has been approved  Medicare and SNAP benefits have been re-certified  [facility] as been created  Pt thanked CHW for her time and stated that she did appreciate all that CHW had assisted her with  CHW informed her she will continue to hear from her Nurse, Lydia Duval  Pt thanked CHW  His referral will be closed today

## 2021-07-08 DIAGNOSIS — N18.32 STAGE 3B CHRONIC KIDNEY DISEASE (HCC): ICD-10-CM

## 2021-07-08 DIAGNOSIS — E11.42 TYPE 2 DIABETES MELLITUS WITH DIABETIC POLYNEUROPATHY, WITH LONG-TERM CURRENT USE OF INSULIN (HCC): ICD-10-CM

## 2021-07-08 DIAGNOSIS — I25.10 CORONARY ARTERY DISEASE INVOLVING NATIVE HEART WITHOUT ANGINA PECTORIS, UNSPECIFIED VESSEL OR LESION TYPE: ICD-10-CM

## 2021-07-08 DIAGNOSIS — Z79.4 CURRENT USE OF INSULIN (HCC): ICD-10-CM

## 2021-07-08 DIAGNOSIS — Z79.4 TYPE 2 DIABETES MELLITUS WITH DIABETIC POLYNEUROPATHY, WITH LONG-TERM CURRENT USE OF INSULIN (HCC): ICD-10-CM

## 2021-07-08 RX ORDER — CLOPIDOGREL BISULFATE 75 MG/1
TABLET ORAL
Qty: 90 TABLET | Refills: 1 | Status: SHIPPED | OUTPATIENT
Start: 2021-07-08 | End: 2022-01-03

## 2021-07-09 ENCOUNTER — TELEPHONE (OUTPATIENT)
Dept: UROLOGY | Facility: AMBULATORY SURGERY CENTER | Age: 61
End: 2021-07-09

## 2021-07-09 ENCOUNTER — PATIENT OUTREACH (OUTPATIENT)
Dept: FAMILY MEDICINE CLINIC | Facility: CLINIC | Age: 61
End: 2021-07-09

## 2021-07-09 NOTE — TELEPHONE ENCOUNTER
Patient has upcoming appointment for nurse visit on 7/29/21 but she is having trouble keeping tube from leg to bag in place  She stated it keeps falling out  Needs advice

## 2021-07-09 NOTE — PROGRESS NOTES
I called the patient to remind her of her Endo appointment on Monday 7/12; PCP and Cards on Tuesday 7/13  She is aware and plans on attending all  The patient states she is feeling well  She reports an elevated blood sugar this morning of 290 which she attributes to eating pizza last night  She is drinking sugar free sprite and flavored water  I asked her to cut portion sizes and continue to keep strict watch of her carb intake  The patient expresses frustration with her catheter but is dealing with it the best she can  She does note the tubing coming apart at times  I asked her to have [facility] check this when they come to the home next week  The patient asked about the order for the shingles vaccine  I informed her I mailed it to her 6/22  She states she will continue looking for it  I told her if she cannot locate it, to have the office re-print the order when she goes to her PCP appointment next week  The patient is looking forward to her vacation in September and is hoping she does not have any serious cardiac issues to keep her from going  I offered support and encouragement  I will continue to follow

## 2021-07-09 NOTE — TELEPHONE ENCOUNTER
I called pt to see how she has been doing, pt states she is doing much better   I also reminded pt of her upcoming appt

## 2021-07-09 NOTE — TELEPHONE ENCOUNTER
Called and spoke with patient  Patient states that the tube that connects from her belly to the bag keeps coming undone  Patient given number for IR and will call Monday for new bag

## 2021-07-09 NOTE — PROGRESS NOTES
I received a call from the patient asking for her Urologist's phone number because she continues to have trouble with her catheter tubing; number provided  She was very appreciative of the help

## 2021-07-10 DIAGNOSIS — Z98.1 S/P CERVICAL SPINAL FUSION: ICD-10-CM

## 2021-07-11 RX ORDER — GABAPENTIN 600 MG/1
600 TABLET ORAL 4 TIMES DAILY
Qty: 120 TABLET | Refills: 2 | Status: SHIPPED | OUTPATIENT
Start: 2021-07-11 | End: 2021-10-25

## 2021-07-13 ENCOUNTER — OFFICE VISIT (OUTPATIENT)
Dept: FAMILY MEDICINE CLINIC | Facility: CLINIC | Age: 61
End: 2021-07-13

## 2021-07-13 ENCOUNTER — PATIENT OUTREACH (OUTPATIENT)
Dept: FAMILY MEDICINE CLINIC | Facility: CLINIC | Age: 61
End: 2021-07-13

## 2021-07-13 ENCOUNTER — OFFICE VISIT (OUTPATIENT)
Dept: CARDIOLOGY CLINIC | Facility: CLINIC | Age: 61
End: 2021-07-13
Payer: COMMERCIAL

## 2021-07-13 VITALS
DIASTOLIC BLOOD PRESSURE: 76 MMHG | SYSTOLIC BLOOD PRESSURE: 136 MMHG | BODY MASS INDEX: 43.38 KG/M2 | OXYGEN SATURATION: 92 % | HEIGHT: 68 IN | HEART RATE: 76 BPM | TEMPERATURE: 97.6 F | WEIGHT: 286.2 LBS | RESPIRATION RATE: 18 BRPM

## 2021-07-13 VITALS
HEART RATE: 77 BPM | SYSTOLIC BLOOD PRESSURE: 130 MMHG | OXYGEN SATURATION: 96 % | HEIGHT: 68 IN | WEIGHT: 283.2 LBS | DIASTOLIC BLOOD PRESSURE: 76 MMHG | BODY MASS INDEX: 42.92 KG/M2

## 2021-07-13 DIAGNOSIS — R29.6 FREQUENT FALLS: ICD-10-CM

## 2021-07-13 DIAGNOSIS — Z79.4 TYPE 2 DIABETES MELLITUS WITH DIABETIC POLYNEUROPATHY, WITH LONG-TERM CURRENT USE OF INSULIN (HCC): ICD-10-CM

## 2021-07-13 DIAGNOSIS — Z98.1 S/P CERVICAL SPINAL FUSION: ICD-10-CM

## 2021-07-13 DIAGNOSIS — K59.00 CONSTIPATION, UNSPECIFIED CONSTIPATION TYPE: ICD-10-CM

## 2021-07-13 DIAGNOSIS — M17.12 PRIMARY OSTEOARTHRITIS OF LEFT KNEE: ICD-10-CM

## 2021-07-13 DIAGNOSIS — I25.10 CORONARY ARTERY DISEASE INVOLVING NATIVE HEART WITHOUT ANGINA PECTORIS, UNSPECIFIED VESSEL OR LESION TYPE: Primary | ICD-10-CM

## 2021-07-13 DIAGNOSIS — Z79.4 TYPE 2 DIABETES MELLITUS WITH DIABETIC POLYNEUROPATHY, WITH LONG-TERM CURRENT USE OF INSULIN (HCC): Primary | ICD-10-CM

## 2021-07-13 DIAGNOSIS — N18.32 STAGE 3B CHRONIC KIDNEY DISEASE (HCC): ICD-10-CM

## 2021-07-13 DIAGNOSIS — E11.42 TYPE 2 DIABETES MELLITUS WITH DIABETIC POLYNEUROPATHY, WITH LONG-TERM CURRENT USE OF INSULIN (HCC): Primary | ICD-10-CM

## 2021-07-13 DIAGNOSIS — E11.621 DIABETIC ULCER OF TOE OF RIGHT FOOT ASSOCIATED WITH TYPE 2 DIABETES MELLITUS, WITH MUSCLE INVOLVEMENT WITHOUT EVIDENCE OF NECROSIS (HCC): ICD-10-CM

## 2021-07-13 DIAGNOSIS — L97.515 DIABETIC ULCER OF TOE OF RIGHT FOOT ASSOCIATED WITH TYPE 2 DIABETES MELLITUS, WITH MUSCLE INVOLVEMENT WITHOUT EVIDENCE OF NECROSIS (HCC): ICD-10-CM

## 2021-07-13 DIAGNOSIS — E11.42 TYPE 2 DIABETES MELLITUS WITH DIABETIC POLYNEUROPATHY, WITH LONG-TERM CURRENT USE OF INSULIN (HCC): ICD-10-CM

## 2021-07-13 DIAGNOSIS — I10 ESSENTIAL HYPERTENSION: ICD-10-CM

## 2021-07-13 LAB — SL AMB POCT HEMOGLOBIN AIC: 7.1 (ref ?–6.5)

## 2021-07-13 PROCEDURE — 99214 OFFICE O/P EST MOD 30 MIN: CPT

## 2021-07-13 PROCEDURE — 83036 HEMOGLOBIN GLYCOSYLATED A1C: CPT | Performed by: NURSE PRACTITIONER

## 2021-07-13 PROCEDURE — 99214 OFFICE O/P EST MOD 30 MIN: CPT | Performed by: NURSE PRACTITIONER

## 2021-07-13 RX ORDER — DOCUSATE SODIUM 100 MG/1
100 CAPSULE, LIQUID FILLED ORAL 2 TIMES DAILY
Qty: 10 CAPSULE | Refills: 0 | Status: SHIPPED | OUTPATIENT
Start: 2021-07-13 | End: 2022-03-29 | Stop reason: SDUPTHER

## 2021-07-13 RX ORDER — HYDROCODONE BITARTRATE AND ACETAMINOPHEN 5; 325 MG/1; MG/1
1 TABLET ORAL 2 TIMES DAILY PRN
Qty: 60 TABLET | Refills: 0 | Status: SHIPPED | OUTPATIENT
Start: 2021-07-13 | End: 2021-09-10 | Stop reason: SDUPTHER

## 2021-07-13 NOTE — PATIENT INSTRUCTIONS
Heart Healthy Diet   AMBULATORY CARE:   A heart healthy diet  is an eating plan low in unhealthy fats and sodium (salt)  The plan is high in healthy fats and fiber  A heart healthy diet helps improve your cholesterol levels and lowers your risk for heart disease and stroke  A dietitian will teach you how to read and understand food labels  Heart healthy diet guidelines to follow:   · Choose foods that contain healthy fats  ? Unsaturated fats  include monounsaturated and polyunsaturated fats  Unsaturated fat is found in foods such as soybean, canola, olive, corn, and safflower oils  It is also found in soft tub margarine that is made with liquid vegetable oil  ? Omega-3 fat  is found in certain fish, such as salmon, tuna, and trout, and in walnuts and flaxseed  Eat fish high in omega-3 fats at least 2 times a week  · Get 20 to 30 grams of fiber each day  Fruits, vegetables, whole-grain foods, and legumes (cooked beans) are good sources of fiber  · Limit or do not have unhealthy fats  ? Cholesterol  is found in animal foods, such as eggs and lobster, and in dairy products made from whole milk  Limit cholesterol to less than 200 mg each day  ? Saturated fat  is found in meats, such as monique and hamburger  It is also found in chicken or turkey skin, whole milk, and butter  Limit saturated fat to less than 7% of your total daily calories  ? Trans fat  is found in packaged foods, such as potato chips and cookies  It is also in hard margarine, some fried foods, and shortening  Do not eat foods that contain trans fats  · Limit sodium as directed  You may be told to limit sodium to 2,000 to 2,300 mg each day  Choose low-sodium or no-salt-added foods  Add little or no salt to food you prepare  Use herbs and spices in place of salt         Include the following in your heart healthy plan:  Ask your dietitian or healthcare provider how many servings to have from each of the following food groups:  · Grains:      ? Whole-wheat breads, cereals, and pastas, and brown rice    ? Low-fat, low-sodium crackers and chips    · Vegetables:      ? Broccoli, green beans, green peas, and spinach    ? Collards, kale, and lima beans    ? Carrots, sweet potatoes, tomatoes, and peppers    ? Canned vegetables with no salt added    · Fruits:      ? Bananas, peaches, pears, and pineapple    ? Grapes, raisins, and dates    ? Oranges, tangerines, grapefruit, orange juice, and grapefruit juice    ? Apricots, mangoes, melons, and papaya    ? Raspberries and strawberries    ? Canned fruit with no added sugar    · Low-fat dairy:      ? Nonfat (skim) milk, 1% milk, and low-fat almond, cashew, or soy milks fortified with calcium    ? Low-fat cheese, regular or frozen yogurt, and cottage cheese    · Meats and proteins:      ? Lean cuts of beef and pork (loin, leg, round), skinless chicken and turkey    ? Legumes, soy products, egg whites, or nuts    Limit or do not include the following in your heart healthy plan:   · Unhealthy fats and oils:      ? Whole or 2% milk, cream cheese, sour cream, or cheese    ? High-fat cuts of beef (T-bone steaks, ribs), chicken or turkey with skin, and organ meats such as liver    ? Butter, stick margarine, shortening, and cooking oils such as coconut or palm oil    · Foods and liquids high in sodium:      ? Packaged foods, such as frozen dinners, cookies, macaroni and cheese, and cereals with more than 300 mg of sodium per serving    ? Vegetables with added sodium, such as instant potatoes, vegetables with added sauces, or regular canned vegetables    ? Cured or smoked meats, such as hot dogs, monique, and sausage    ? High-sodium ketchup, barbecue sauce, salad dressing, pickles, olives, soy sauce, or miso    · Foods and liquids high in sugar:      ? Candy, cake, cookies, pies, or doughnuts    ? Soft drinks (soda), sports drinks, or sweetened tea    ?  Canned or dry mixes for cakes, soups, sauces, or gravies    Other healthy heart guidelines:   · Do not smoke  Nicotine and other chemicals in cigarettes and cigars can cause lung and heart damage  Ask your healthcare provider for information if you currently smoke and need help to quit  E-cigarettes or smokeless tobacco still contain nicotine  Talk to your healthcare provider before you use these products  · Limit or do not drink alcohol as directed  Alcohol can damage your heart and raise your blood pressure  Your healthcare provider may give you specific daily and weekly limits  The general recommended limit is 1 drink a day for women 21 or older and for men 72 or older  Do not have more than 3 drinks in a day or 7 in a week  The recommended limit is 2 drinks a day for men 24to 59years of age  Do not have more than 4 drinks in a day or 14 in a week  A drink of alcohol is 12 ounces of beer, 5 ounces of wine, or 1½ ounces of liquor  · Exercise regularly  Exercise can help you maintain a healthy weight and improve your blood pressure and cholesterol levels  Regular exercise can also decrease your risk for heart problems  Ask your healthcare provider about the best exercise plan for you  Do not start an exercise program without asking your healthcare provider  Follow up with your doctor or cardiologist as directed:  Write down your questions so you remember to ask them during your visits  © Copyright [address] Information is for End User's use only and may not be sold, redistributed or otherwise used for commercial purposes  All illustrations and images included in CareNotes® are the copyrighted property of [employer]  or [employer]  The above information is an  only  It is not intended as medical advice for individual conditions or treatments  Talk to your doctor, nurse or pharmacist before following any medical regimen to see if it is safe and effective for you
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
## 2021-07-13 NOTE — ASSESSMENT & PLAN NOTE
Lab Results   Component Value Date    HGBA1C 7 1 (A) 07/13/2021     Glucose has stabilized since last visit - generally around 100   She is taking Lantus 50 units bid and Humalog 12-20 units based on carb intake - advised her to take 12 units of Humalog at meals to avoid hypoglycemia which was previously frequently occurring   Continue with Trulicity 1 5 mg weekly   She has appointment with endocrinology in October

## 2021-07-13 NOTE — PROGRESS NOTES
CANDACE Snow completed chart review and noted that CHW has completed all social goals with Pt  CANDACE SNOW will close referral at this time and remain available as needed for further assistance
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
## 2021-07-14 NOTE — ASSESSMENT & PLAN NOTE
Blood pressure at goal 136/76  Continue to hold lisinopril and hctz at this time and continue with increased dose of Coreg 25 mg bid  Advise daughter and patient careful BP monitoring and to inform me of persistently >140/90

## 2021-07-15 ENCOUNTER — PATIENT OUTREACH (OUTPATIENT)
Dept: FAMILY MEDICINE CLINIC | Facility: CLINIC | Age: 61
End: 2021-07-15

## 2021-07-15 NOTE — PROGRESS NOTES
Incoming Mail / Chart Review / Danna Aguirre Call / Danna Aguirre Mail:  7/15/2021    CHW did receive [employer] packet from pt  CHW did review packet and noticed three pages have not been completed and pt did not sign off at end of packet  CHW did highlight areas that needed to be completed and did call pt to discuss with her  CHW did go over some of the questions that pt did not complete and gave her examples of what someone might say as she was confused and asked her to give it thought and complete it as she wishes  Pt agreed to complete  CHW did mail it out to pt and asked she returns via mail or in person when completed  Pt agreed and thanked CHW

## 2021-07-16 ENCOUNTER — TELEPHONE (OUTPATIENT)
Dept: FAMILY MEDICINE CLINIC | Facility: CLINIC | Age: 61
End: 2021-07-16

## 2021-07-16 NOTE — TELEPHONE ENCOUNTER
SIGNATURES NEEDED FOR [facility] patient care solution ( ) ()  FORM RECEIVED VIA FAX  WILL BE PLACED IN FORM BIN FOR MA PICKUP

## 2021-07-21 DIAGNOSIS — Z79.4 TYPE 2 DIABETES MELLITUS WITH DIABETIC POLYNEUROPATHY, WITH LONG-TERM CURRENT USE OF INSULIN (HCC): ICD-10-CM

## 2021-07-21 DIAGNOSIS — I25.10 CORONARY ARTERY DISEASE INVOLVING NATIVE HEART WITHOUT ANGINA PECTORIS, UNSPECIFIED VESSEL OR LESION TYPE: ICD-10-CM

## 2021-07-21 DIAGNOSIS — N18.32 STAGE 3B CHRONIC KIDNEY DISEASE (HCC): ICD-10-CM

## 2021-07-21 DIAGNOSIS — Z79.4 CURRENT USE OF INSULIN (HCC): ICD-10-CM

## 2021-07-21 DIAGNOSIS — E11.42 TYPE 2 DIABETES MELLITUS WITH DIABETIC POLYNEUROPATHY, WITH LONG-TERM CURRENT USE OF INSULIN (HCC): ICD-10-CM

## 2021-07-21 RX ORDER — CARVEDILOL 25 MG/1
TABLET ORAL
Qty: 60 TABLET | Refills: 0 | Status: SHIPPED | OUTPATIENT
Start: 2021-07-21 | End: 2021-08-13

## 2021-07-27 ENCOUNTER — PATIENT OUTREACH (OUTPATIENT)
Dept: FAMILY MEDICINE CLINIC | Facility: CLINIC | Age: 61
End: 2021-07-27

## 2021-07-27 NOTE — PROGRESS NOTES
I called the patient to follow up  She states she is doing well  I congratulated her on continuing to reduce her A1C steadily over the past 6 months  I offered continued encouragement and asked her to stay on this track  The patient reports her fasting sugar this morning was 127  The patient notes in the past her catheter tubing was coming apart; this has since been rectified to the point it is so tight she cannot loosen it  She has a follow up appointment next week with Urology and hopes to have this issue addressed  She also has a PCP the same day; she asks that I call her with a reminder

## 2021-08-01 DIAGNOSIS — I25.10 CORONARY ARTERY DISEASE INVOLVING NATIVE HEART WITHOUT ANGINA PECTORIS, UNSPECIFIED VESSEL OR LESION TYPE: ICD-10-CM

## 2021-08-01 DIAGNOSIS — Z79.4 TYPE 2 DIABETES MELLITUS WITH DIABETIC POLYNEUROPATHY, WITH LONG-TERM CURRENT USE OF INSULIN (HCC): ICD-10-CM

## 2021-08-01 DIAGNOSIS — Z79.4 CURRENT USE OF INSULIN (HCC): ICD-10-CM

## 2021-08-01 DIAGNOSIS — E11.42 TYPE 2 DIABETES MELLITUS WITH DIABETIC POLYNEUROPATHY, WITH LONG-TERM CURRENT USE OF INSULIN (HCC): ICD-10-CM

## 2021-08-01 DIAGNOSIS — N18.32 STAGE 3B CHRONIC KIDNEY DISEASE (HCC): ICD-10-CM

## 2021-08-01 RX ORDER — CARVEDILOL 12.5 MG/1
TABLET ORAL
Qty: 180 TABLET | Refills: 1 | Status: SHIPPED | OUTPATIENT
Start: 2021-08-01 | End: 2021-08-03

## 2021-08-02 ENCOUNTER — PATIENT OUTREACH (OUTPATIENT)
Dept: FAMILY MEDICINE CLINIC | Facility: CLINIC | Age: 61
End: 2021-08-02

## 2021-08-02 NOTE — PROGRESS NOTES
I called the patient to remind her of her Uro and PCP appointments scheduled for tomorrow; she was aware and plans on attending  The patient states her PCP appointment is a pre-op physical for cataract removal scheduled on 8/9 for OD and 8/23 for OS  The patient states hopefully after the surgeries she can get a new pair of eyeglasses noting the one lens "popped out" and she cannot locate it  I reminded the patient of having the staff re-print her shingles vaccine order at her appointment tomorrow  The patient states she is concerned about going to [place] next month because of the increasing Covid numbers  She spoke in detail about a family friend from [place] who passed away at 29 from [address] I will follow up with the patient after her surgery

## 2021-08-03 ENCOUNTER — LAB (OUTPATIENT)
Dept: LAB | Facility: HOSPITAL | Age: 61
End: 2021-08-03
Payer: COMMERCIAL

## 2021-08-03 ENCOUNTER — TELEPHONE (OUTPATIENT)
Dept: FAMILY MEDICINE CLINIC | Facility: CLINIC | Age: 61
End: 2021-08-03

## 2021-08-03 ENCOUNTER — TELEPHONE (OUTPATIENT)
Dept: UROLOGY | Facility: CLINIC | Age: 61
End: 2021-08-03

## 2021-08-03 ENCOUNTER — PROCEDURE VISIT (OUTPATIENT)
Dept: UROLOGY | Facility: CLINIC | Age: 61
End: 2021-08-03
Payer: COMMERCIAL

## 2021-08-03 ENCOUNTER — CONSULT (OUTPATIENT)
Dept: FAMILY MEDICINE CLINIC | Facility: CLINIC | Age: 61
End: 2021-08-03

## 2021-08-03 VITALS
DIASTOLIC BLOOD PRESSURE: 80 MMHG | SYSTOLIC BLOOD PRESSURE: 160 MMHG | WEIGHT: 290 LBS | BODY MASS INDEX: 44.09 KG/M2 | HEART RATE: 81 BPM | TEMPERATURE: 97.1 F | RESPIRATION RATE: 19 BRPM | OXYGEN SATURATION: 95 %

## 2021-08-03 VITALS
WEIGHT: 291 LBS | DIASTOLIC BLOOD PRESSURE: 90 MMHG | SYSTOLIC BLOOD PRESSURE: 160 MMHG | HEART RATE: 92 BPM | BODY MASS INDEX: 44.1 KG/M2 | RESPIRATION RATE: 20 BRPM | HEIGHT: 68 IN

## 2021-08-03 DIAGNOSIS — E03.9 HYPOTHYROIDISM, UNSPECIFIED TYPE: ICD-10-CM

## 2021-08-03 DIAGNOSIS — E11.42 TYPE 2 DIABETES MELLITUS WITH DIABETIC POLYNEUROPATHY, WITH LONG-TERM CURRENT USE OF INSULIN (HCC): ICD-10-CM

## 2021-08-03 DIAGNOSIS — Z79.4 TYPE 2 DIABETES MELLITUS WITH DIABETIC POLYNEUROPATHY, WITH LONG-TERM CURRENT USE OF INSULIN (HCC): ICD-10-CM

## 2021-08-03 DIAGNOSIS — E78.2 MIXED HYPERLIPIDEMIA: ICD-10-CM

## 2021-08-03 DIAGNOSIS — N31.9 NEUROGENIC BLADDER: Primary | ICD-10-CM

## 2021-08-03 DIAGNOSIS — E87.5 HYPERKALEMIA: ICD-10-CM

## 2021-08-03 DIAGNOSIS — E03.9 ACQUIRED HYPOTHYROIDISM: ICD-10-CM

## 2021-08-03 DIAGNOSIS — N18.9 CHRONIC KIDNEY DISEASE, UNSPECIFIED CKD STAGE: ICD-10-CM

## 2021-08-03 DIAGNOSIS — Z23 ENCOUNTER FOR IMMUNIZATION: Primary | ICD-10-CM

## 2021-08-03 DIAGNOSIS — I10 ESSENTIAL HYPERTENSION: ICD-10-CM

## 2021-08-03 LAB
ANION GAP SERPL CALCULATED.3IONS-SCNC: 9 MMOL/L (ref 5–14)
BUN SERPL-MCNC: 51 MG/DL (ref 5–25)
CALCIUM SERPL-MCNC: 9.2 MG/DL (ref 8.4–10.2)
CHLORIDE SERPL-SCNC: 107 MMOL/L (ref 97–108)
CHOLEST SERPL-MCNC: 152 MG/DL
CO2 SERPL-SCNC: 27 MMOL/L (ref 22–30)
CREAT SERPL-MCNC: 2.26 MG/DL (ref 0.6–1.2)
EST. AVERAGE GLUCOSE BLD GHB EST-MCNC: 157 MG/DL
GFR SERPL CREATININE-BSD FRML MDRD: 23 ML/MIN/1.73SQ M
GLUCOSE P FAST SERPL-MCNC: 88 MG/DL (ref 70–99)
HBA1C MFR BLD: 7.1 %
HDLC SERPL-MCNC: 30 MG/DL
LDLC SERPL CALC-MCNC: 86 MG/DL
NONHDLC SERPL-MCNC: 122 MG/DL
POTASSIUM SERPL-SCNC: 5.1 MMOL/L (ref 3.6–5)
SODIUM SERPL-SCNC: 143 MMOL/L (ref 137–147)
T4 FREE SERPL-MCNC: 0.8 NG/DL (ref 0.76–1.46)
TRIGL SERPL-MCNC: 180 MG/DL
TSH SERPL DL<=0.05 MIU/L-ACNC: 4.43 UIU/ML (ref 0.47–4.68)

## 2021-08-03 PROCEDURE — 84166 PROTEIN E-PHORESIS/URINE/CSF: CPT | Performed by: PATHOLOGY

## 2021-08-03 PROCEDURE — 51705 CHANGE OF BLADDER TUBE: CPT

## 2021-08-03 PROCEDURE — 83036 HEMOGLOBIN GLYCOSYLATED A1C: CPT

## 2021-08-03 PROCEDURE — 80061 LIPID PANEL: CPT

## 2021-08-03 PROCEDURE — 84439 ASSAY OF FREE THYROXINE: CPT

## 2021-08-03 PROCEDURE — 84156 ASSAY OF PROTEIN URINE: CPT

## 2021-08-03 PROCEDURE — 82043 UR ALBUMIN QUANTITATIVE: CPT

## 2021-08-03 PROCEDURE — 80048 BASIC METABOLIC PNL TOTAL CA: CPT

## 2021-08-03 PROCEDURE — 84166 PROTEIN E-PHORESIS/URINE/CSF: CPT | Performed by: INTERNAL MEDICINE

## 2021-08-03 PROCEDURE — 82570 ASSAY OF URINE CREATININE: CPT

## 2021-08-03 PROCEDURE — 86334 IMMUNOFIX E-PHORESIS SERUM: CPT | Performed by: PATHOLOGY

## 2021-08-03 PROCEDURE — 84165 PROTEIN E-PHORESIS SERUM: CPT

## 2021-08-03 PROCEDURE — 36415 COLL VENOUS BLD VENIPUNCTURE: CPT

## 2021-08-03 PROCEDURE — 99215 OFFICE O/P EST HI 40 MIN: CPT | Performed by: NURSE PRACTITIONER

## 2021-08-03 PROCEDURE — 84443 ASSAY THYROID STIM HORMONE: CPT

## 2021-08-03 PROCEDURE — 86334 IMMUNOFIX E-PHORESIS SERUM: CPT

## 2021-08-03 PROCEDURE — 84165 PROTEIN E-PHORESIS SERUM: CPT | Performed by: PATHOLOGY

## 2021-08-03 RX ORDER — AMLODIPINE BESYLATE 5 MG/1
5 TABLET ORAL DAILY
Qty: 30 TABLET | Refills: 0 | Status: ON HOLD | OUTPATIENT
Start: 2021-08-03 | End: 2021-08-26

## 2021-08-03 RX ORDER — ZOSTER VACCINE RECOMBINANT, ADJUVANTED 50 MCG/0.5
0.5 KIT INTRAMUSCULAR ONCE
Qty: 1 EACH | Refills: 1 | Status: SHIPPED | OUTPATIENT
Start: 2021-08-03 | End: 2021-08-03

## 2021-08-03 NOTE — TELEPHONE ENCOUNTER
Patient managed by Dr Roz Boxer for neurogenic bladder  Patient seen today for initial SPT change  Visiting nurse will assume SPT care and monthly changes  Patient placed on recall list for one year  Will send message to [facility] asking if imaging is needed prior to yearly appointment

## 2021-08-03 NOTE — PROGRESS NOTES
8/3/2021    Jim Porras  1960  [id number]    Diagnosis  Chief Complaint     Neurogenic Bladder; SPT CHANGE          Patient presents for SPT chnage managed by Dr Jean-Paul Perez  Visiting nurse will assume morales care including monthly changes  Patient placed on recall list for one year    Procedure: Suprapubic Tube Change         Cystostomy tube change     Date/Time 8/3/2021 1:49 PM     Performed by  Tere Vaughn RN     Authorized by Nae Acevedo MD      Universal Protocol   Consent: Verbal consent obtained  Consent given by: patient       Procedure Details   Patient tolerance: patient tolerated the procedure well with no immediate complications               Current catheter removed without difficulty after deflation of an intact balloon  Site prepped with Betadine, new 23O  silicone spt change via aseptic technique without incident, 10 ml balloon inflated with sterile water  irrigated easily for pink-tinged return, mild spasm noted  Patient tolerated well  Attached to drainage bag       Vitals:    08/03/21 1311   BP: 160/90   Pulse: 92   Resp: 20   Weight: 132 kg (291 lb)   Height: 5' 8" (1 727 m)         Tere Vaughn RN

## 2021-08-03 NOTE — PROGRESS NOTES
FAMILY PRACTICE PRE-OPERATIVE EVALUATION  [facility] PHYSICIAN GROUP [facility] MARYJANE    NAME: Venkata Hernandez  AGE: 64 y o  SEX: female  : 1960     DATE: 2021    Family Practice Pre-Operative Evaluation      Chief Complaint: Pre-operative Evaluation     Surgery: cataract extraction BL   Anticipated Date of Surgery: 2021 right eye, 2021 left eye      History of Present Illness:     Venkata Hernandez is a 64 y o  female who  has a past medical history of Abnormal liver function, Anemia, Anxiety, Arthritis, Chronic kidney disease, Chronic narcotic dependence ([address] ), Chronic pain disorder, Coronary artery disease, Depression, Diabetes mellitus ([facility] [address] ), GERD (gastroesophageal reflux disease), Heart murmur, Hyperlipidemia, Hypertension, Open toe wound, Renal disorder, Shortness of breath, and Sleep apnea  who presented to the office today for pre-operative evaluation  Planned procedure is BL cataract extraction performed under MAC anesthesia by [facility]  Patient has a bleeding risk of: no recent abnormal bleeding  Patient does not have objections to receiving blood products if needed  Current anti-platelet/anti-coagulation medications that the patient is prescribed includes: Aspirin, Plavix     Assessment of Chronic Conditions:   - Diabetes Mellitus: stable- recent A1c 7 1    - Coronary Artery Disease: hx of PCI x 5 in  and , following with cardiology 2/2 angina; plan is for CABG after she FU with renal and urology   - Renal Failure with serum creatinine >2mg/dL: following with nephrology   - COPD: stable  - Hypertension: currently elevated, her lisinopril and hctz were previously d/c'd due to worsening renal function  start amlodipine today       Assessment of Cardiac Risk:  · Denies unstable or severe angina or MI in the last 6 weeks or history of stent placement in the last year   · Denies decompensated heart failure (e g  New onset heart failure, NYHA functional class IV heart failure, or worsening existing heart failure)  · Denies significant arrhythmias such as high grade AV block, symptomatic ventricular arrhythmia, newly recognized ventricular tachycardia, supraventricular tachycardia with resting heart rate >100, or symptomatic bradycardia  Denies severe heart valve disease including aortic stenosis or symptomatic mitral stenosis     Exercise Capacity:  · Able to walk 4 blocks without symptoms?: No  · Able to walk 2 flights without symptoms?: No     Prior Anesthesia Reactions: No     Personal history of venous thromboembolic disease? No     History of steroid use for >2 weeks within last year? No          Data:      Pre-operative work-up     Laboratory Results: I have personally reviewed the pertinent laboratory results/reports        BMP     Ref Range & Units 8/3/21  4:13 PM   Sodium 137 - 147 mmol/L 143    Potassium 3 6 - 5 0 mmol/L 5 1High     Chloride 97 - 108 mmol/L 107    CO2 22 - 30 mmol/L 27    ANION GAP 5 - 14 mmol/L 9    BUN 5 - 25 mg/dL 51High     Creatinine 0 60 - 1 20 mg/dL 2  26High        Glucose, Fasting 70 - 99 mg/dL 88       Calcium 8 4 - 10 2 mg/dL 9 2    eGFR >60 ml/min/1 73sq m 23Low             EKG: I have personally reviewed pertinent reports  Chest x-ray: I have personally reviewed pertinent reports  Previous cardiopulmonary studies within the past year:  Echocardiogram: 1/2021   IMPRESSIONS:  Technical quality: Good  Cardiac rhythm: Sinus with interventricular conduction delay  1  Normal left ventricular size and systolic function  Grade 1 diastolic dysfunction  EF around 60%  2  Normal right ventricular size and systolic function  3  Normal left and right atrial size  Aneurysmal interatrial septum without color-flow Doppler evidence of interatrial shunts  4  Mild aortic valve sclerosis, no aortic valve stenosis or regurgitation  5  Trace mitral and tricuspid valve regurgitation    6  No obvious pulmonary hypertension  7  Trace pericardial effusion  · Cardiac Catheterization:none  · Stress Test: none  · Pulmonary Function Testing: none         Review of Systems:     Review of Systems   Constitutional: Negative for chills and fever  HENT: Negative for ear pain and sore throat  Eyes: Negative for pain and visual disturbance  Respiratory: Negative for cough and shortness of breath  Cardiovascular: Negative for chest pain and palpitations  Gastrointestinal: Negative for abdominal pain and vomiting  Genitourinary: Negative for dysuria and hematuria  Musculoskeletal: Positive for back pain  Negative for arthralgias  Skin: Negative for color change and rash  Neurological: Negative for dizziness, seizures and syncope  All other systems reviewed and are negative  Current Problem List:     Patient Active Problem List   Diagnosis    Cellulitis of finger of right hand    Major depressive disorder    Type 2 diabetes mellitus with diabetic polyneuropathy, with long-term current use of insulin ([facility])    Anxiety    CAD (coronary artery disease)    Osteoarthritis of left knee    Healthcare maintenance    Normochromic normocytic anemia    CKD (chronic kidney disease) stage 3, GFR 30-59 ml/min ([facility])    Abnormal LFTs    S/P cervical spinal fusion    Head lump    Need for follow-up by     Diabetic ulcer of toe of right foot associated with type 2 diabetes mellitus, with muscle involvement without evidence of necrosis ([facility] [address] )    HTN (hypertension)    Skin ulcer of hand, limited to breakdown of skin ([facility])    Chronic bronchitis ([facility] [address] )    Severe episode of recurrent major depressive disorder, without psychotic features ([facility] [address] )    Memory impairment    Morbid obesity with BMI of 45 0-49 9, adult ([facility] [address] )    History of heart artery stent    ARF (acute renal failure) ([facility] [address] )       Allergies:      Allergies   Allergen Reactions    Codeine      Other reaction(s): Nausea and Vomiting    Latex Itching       Current Medications:       Current Outpatient Medications:     allopurinol (ZYLOPRIM) 300 mg tablet, Take 1 tablet (300 mg total) by mouth daily, Disp: 90 tablet, Rfl: 1    Aspirin Low Dose 81 MG EC tablet, TAKE 1 TABLET (81 MG TOTAL) BY MOUTH ONCE FOR 1 DOSE, Disp: 90 tablet, Rfl: 0    atorvastatin (LIPITOR) 40 mg tablet, Take 1 tablet (40 mg total) by mouth daily, Disp: 30 tablet, Rfl: 2    carvedilol (COREG) 25 mg tablet, TAKE 1 TABLET BY MOUTH TWICE A DAY WITH FOOD, Disp: 60 tablet, Rfl: 0    citalopram (CeleXA) 40 mg tablet, Take 1 tablet (40 mg total) by mouth daily, Disp: 30 tablet, Rfl: 2    clopidogrel (PLAVIX) 75 mg tablet, TAKE 1 TABLET BY MOUTH EVERY DAY, Disp: 90 tablet, Rfl: 1    Continuous Blood Gluc  (FreeStyle [place] 2 [place]) YOUNG, Use as directed, Disp: 1 each, Rfl: 0    Continuous Blood Gluc Sensor (NetradaStyle Iraida 14 Day Sensor) MISC, USE 1 SENSOR EVERY 2 WEEKS, Disp: 2 each, Rfl: 3    Diclofenac Sodium (VOLTAREN) 1 %, Apply 2 g topically 4 (four) times a day (Patient taking differently: Apply 2 g topically as needed ), Disp: 100 g, Rfl: 1    diphenhydrAMINE (BENADRYL) 25 mg capsule, Take 25 mg by mouth every 4 (four) hours as needed for allergies or sleep , Disp: , Rfl:     docusate sodium (COLACE) 100 mg capsule, Take 1 capsule (100 mg total) by mouth 2 (two) times a day, Disp: 10 capsule, Rfl: 0    Dulaglutide 1 5 MG/0 5ML SOPN, Inject 0 5 mL (1 5 mg total) under the skin once a week, Disp: 4 pen, Rfl: 3    gabapentin (NEURONTIN) 600 MG tablet, TAKE 1 TABLET (600 MG TOTAL) BY MOUTH 4 (FOUR) TIMES A DAY, Disp: 120 tablet, Rfl: 2    glucose blood (OneTouch Ultra) test strip, Use 1 each daily Use as instructed, Disp: 100 each, Rfl: 2    HYDROcodone-acetaminophen (NORCO) 5-325 mg per tablet, Take 1 tablet by mouth 2 (two) times a day as needed for painMax Daily Amount: 2 tablets, Disp: 60 tablet, Rfl: 0    insulin glargine (Lantus SoloStar) 100 units/mL injection pen, Inject 40 Units under the skin every 12 (twelve) hours (Patient taking differently: Inject 50 Units under the skin every 12 (twelve) hours ), Disp: 15 mL, Rfl: 3    insulin lispro (HumaLOG KwikPen) 100 units/mL injection pen, Inject 10 Units under the skin 3 (three) times a day with meals If blood glucose >150, Disp: 15 mL, Rfl: 5    Insulin Pen Needle (BD Pen Needle Micro U/F) 32G X 6 MM MISC, Use 3 (three) times a day, Disp: 100 each, Rfl: 5    Insulin Syringe-Needle U-100 (BD Veo Insulin Syringe U/F) 31G X 15/64" 0 5 ML MISC, Inject under the skin 3 (three) times a day, Disp: 100 each, Rfl: 2    isosorbide mononitrate (IMDUR) 30 mg 24 hr tablet, Take 1 tablet (30 mg total) by mouth daily, Disp: 30 tablet, Rfl: 6    Lancets (OneTouch Delica Plus [id number]) MISC, USE TO TEST 3 TIMES DAILY, Disp: 100 each, Rfl: 2    levothyroxine 50 mcg tablet, TAKE 1 TABLET BY MOUTH EVERY DAY, Disp: 60 tablet, Rfl: 0    OLANZapine (ZyPREXA) 5 mg tablet, TAKE 1 TABLET BY MOUTH AT BEDTIME, Disp: 90 tablet, Rfl: 0    oxybutynin (DITROPAN-XL) 10 MG 24 hr tablet, Take 1 tablet (10 mg total) by mouth daily at bedtime, Disp: 30 tablet, Rfl: 3    amLODIPine (NORVASC) 5 mg tablet, Take 1 tablet (5 mg total) by mouth daily, Disp: 30 tablet, Rfl: 0    lactulose (CHRONULAC) 10 g/15 mL solution, Take 20 g by mouth daily at bedtime For elev Ammonia level (Patient not taking: Reported on 6/23/2021), Disp: , Rfl:     lidocaine (LIDODERM) 5 %, Apply 1 patch topically daily Remove & Discard patch within 12 hours or as directed by MD (Patient not taking: Reported on 7/13/2021), Disp: 90 patch, Rfl: 1    mupirocin (BACTROBAN) 2 % ointment, Apply topically daily (Patient not taking: Reported on 8/3/2021), Disp: 22 g, Rfl: 0    naloxone (NARCAN) 4 mg/0 1 mL nasal spray, Administer 1 spray into a nostril   If breathing does not return to normal or if breathing difficulty resumes after 2-3 minutes, give another dose in the other nostril using a new spray  (Patient not taking: Reported on 7/13/2021), Disp: 1 each, Rfl: 1    nitroglycerin (NITROSTAT) 0 4 mg SL tablet, Place 1 tablet (0 4 mg total) under the tongue every 5 (five) minutes as needed for chest pain (Patient not taking: Reported on 8/3/2021), Disp: 25 tablet, Rfl: 1    silver sulfadiazine (SILVADENE,SSD) 1 % cream, Apply topically daily To burns on fingers, cover w/band-aid   (Patient not taking: Reported on 8/3/2021), Disp: 50 g, Rfl: 0    SPS 15 GM/60ML suspension, , Disp: , Rfl:     Past Medical History:       Past Medical History:   Diagnosis Date    Abnormal liver function     Anemia     Anxiety     Arthritis     Chronic kidney disease     stage 3    Chronic narcotic dependence (HCC)     Chronic pain disorder     lower back, hands , neck and knees    Coronary artery disease     Depression     Diabetes mellitus ([facility] [address] )     GERD (gastroesophageal reflux disease)     no meds at present    Heart murmur     murmur    Hyperlipidemia     Hypertension     Neurogenic bladder     Open toe wound 12/2020    right big toe open calus but is dry at present    Renal disorder     Shortness of breath     exertion    Sleep apnea     doesn't use cpap    Suprapubic catheter [facility])         Past Surgical History:   Procedure Laterality Date    AMPUTATION      ANGIOPLASTY  2017    5    BREAST EXCISIONAL BIOPSY Left     BREAST SURGERY      CARDIAC CATHETERIZATION      CARPAL TUNNEL RELEASE Bilateral     CERVICAL FUSION      HYSTERECTOMY  2008    IR SUPRAPUBIC CATHETER PLACEMENT  6/15/2021    KNEE SURGERY      OOPHORECTOMY  2008    PA EXC SKIN BENIG 3 1-4 CM REMAINDR BODY N/A 12/21/2020    Procedure: EXCISION SEBACEOUS CYST X 5 SCALP;  Surgeon: Ye Michelle MD;  Location:  MAIN OR;  Service: General    TOE AMPUTATION Left     TRIGGER FINGER RELEASE Right     4th Finger        Family History   Problem Relation Age of Onset    Stroke Father     Heart disease Father  No Known Problems Mother     No Known Problems Sister     No Known Problems Daughter     No Known Problems Maternal Grandmother     No Known Problems Maternal Grandfather     No Known Problems Paternal Grandmother     No Known Problems Paternal Grandfather     No Known Problems Maternal Aunt     No Known Problems Maternal Aunt     No Known Problems Maternal Aunt     No Known Problems Maternal Aunt     No Known Problems Maternal Aunt     No Known Problems Maternal Aunt     No Known Problems Paternal Aunt         Social History     Socioeconomic History    Marital status:      Spouse name: Not on file    Number of children: Not on file    Years of education: Not on file    Highest education level: Not on file   Occupational History    Not on file   Tobacco Use    Smoking status: Former Smoker     Packs/day: 1 00     Years: 35 00     Pack years: 35 00     Types: Cigarettes     Quit date:      Years since quittin 5    Smokeless tobacco: Never Used   Vaping Use    Vaping Use: Never used   Substance and Sexual Activity    Alcohol use: Not Currently    Drug use: Never    Sexual activity: Not Currently   Other Topics Concern    Not on file   Social History Narrative    Not on file     Social Determinants of Health     Financial Resource Strain:     Difficulty of Paying Living Expenses:    Food Insecurity:     Worried About Running Out of Food in the Last Year:     Ran Out of Food in the Last Year:    Transportation Needs:     Lack of Transportation (Medical):      Lack of Transportation (Non-Medical):    Physical Activity:     Days of Exercise per Week:     Minutes of Exercise per Session:    Stress:     Feeling of Stress :    Social Connections:     Frequency of Communication with Friends and Family:     Frequency of Social Gatherings with Friends and Family:     Attends Sikhism Services:     Active Member of Clubs or Organizations:     Attends Club or Organization Meetings:     Marital Status:    Intimate Partner Violence:     Fear of Current or Ex-Partner:     Emotionally Abused:     Physically Abused:     Sexually Abused:         Physical Exam:     /80 (BP Location: Left arm, Patient Position: Sitting, Cuff Size: Adult)   Pulse 81   Temp (!) 97 1 °F (36 2 °C) (Temporal)   Resp 19   Wt 132 kg (290 lb)   SpO2 95%   BMI 44 09 kg/m²     Physical Exam  Vitals and nursing note reviewed  Constitutional:       General: She is not in acute distress  Appearance: She is well-developed  She is obese  She is not diaphoretic  HENT:      Head: Normocephalic and atraumatic  Right Ear: External ear normal       Left Ear: External ear normal    Eyes:      Conjunctiva/sclera: Conjunctivae normal       Pupils: Pupils are equal, round, and reactive to light  Cardiovascular:      Rate and Rhythm: Normal rate and regular rhythm  Pulmonary:      Effort: Pulmonary effort is normal  No respiratory distress  Breath sounds: Normal breath sounds  No wheezing  Abdominal:      General: Bowel sounds are normal  There is no distension  Palpations: Abdomen is soft  Tenderness: There is no abdominal tenderness  There is no guarding or rebound  Musculoskeletal:         General: No deformity  Cervical back: Neck supple  Tenderness present  Decreased range of motion  Thoracic back: Tenderness present  Decreased range of motion  Lumbar back: Tenderness present  Decreased range of motion  Lymphadenopathy:      Cervical: No cervical adenopathy  Skin:     General: Skin is warm and dry  Capillary Refill: Capillary refill takes less than 2 seconds  Findings: No rash  Neurological:      Mental Status: She is alert and oriented to person, place, and time  Mental status is at baseline     Psychiatric:         Mood and Affect: Mood normal          Behavior: Behavior normal          Operative site has been examined and clear of skin infection and inflammation  Assessment & Recommendations:     Pre-Op Evaluation Assessment  41 y o  female with planned surgery: cataract extractions   Known risk factors for perioperative complications: Coronary disease  Chronic pulmonary disease  Diabetes mellitus  Renal dysfunction  Pre-Op Evaluation Plan  1  Further preoperative workup as follows:   - she requires cardiac clearance by her cardiologist per surgery center's instructions      2  Medication Management/Recommendations:   - Patient has been instructed to avoid herbs or non-directed vitamins the week prior to surgery to ensure no drug interactions with perioperative surgical and anesthetic medications  - Patient has been instructed to avoid aspirin containing medications, Plavix, or non-steroidal anti-inflammatory drugs for the week preceding surgery  3  Patient requires further consultation with: As above, cardiology          Surgical Procedure risk category: low risk     Patient specific operative risk categegory: High Risk       All of patients questions were answered  Patient understands and agrees with the above plan       CHERELLE Villarreal  08/04/21

## 2021-08-03 NOTE — PATIENT INSTRUCTIONS
Hypertension   AMBULATORY CARE:   Hypertension  is high blood pressure  Your blood pressure is the force of your blood moving against the walls of your arteries  Hypertension causes your blood pressure to get so high that your heart has to work much harder than normal  This can damage your heart  The cause of hypertension may not be known  This is called essential or primary hypertension  Hypertension caused by another medical condition, such as kidney disease, is called secondary hypertension  Common symptoms include the following:   · Headache    · Blurred vision    · Chest pain    · Dizziness or weakness    · Trouble breathing    · Nosebleeds    Call your local emergency number (911 in the [address] [facility]) or have someone call if:   · You have chest pain  · You have any of the following signs of a heart attack:      ? Squeezing, pressure, or pain in your chest    ? You may  also have any of the following:     § Discomfort or pain in your back, neck, jaw, stomach, or arm    § Shortness of breath    § Nausea or vomiting    § Lightheadedness or a sudden cold sweat    · You become confused or have trouble speaking  · You suddenly feel lightheaded or have trouble breathing  Seek care immediately if:   · You have a severe headache or vision loss  · You have weakness in an arm or leg  Call your doctor if:   · You feel faint, dizzy, confused, or drowsy  · You have been taking your blood pressure medicine but your pressure is higher than your provider says it should be  · You have questions or concerns about your condition or care  What you need to know about the stages of hypertension:       · Normal blood pressure is 119/79 or lower   Your healthcare provider may only check your blood pressure each year if it stays at a normal level  · Elevated blood pressure is 120/79 to 129/79   This is sometimes called prehypertension   Your healthcare provider may suggest lifestyle changes to help lower your blood pressure to a normal level  He or she may then check it again in 3 to 6 months  · Stage 1 hypertension is 130/80  to 139/89   Your provider may recommend lifestyle changes, medication, and checks every 3 to 6 months until your blood pressure is controlled  · Stage 2 hypertension is 140/90 or higher   Your provider will recommend lifestyle changes and have you take 2 kinds of hypertension medicines  You will also need to have your blood pressure checked monthly until it is controlled  Treatment for hypertension  may include medicine to lower your blood pressure and lower your cholesterol level  A low cholesterol level helps prevent heart disease and makes it easier to control your blood pressure  Take your medicine exactly as directed  You may also need to make lifestyle changes  Manage hypertension:   · Check your blood pressure at home  Avoid smoking, caffeine, and exercise at least 30 minutes before checking your blood pressure  Sit and rest for 5 minutes before you take your blood pressure  Extend your arm and support it on a flat surface  Your arm should be at the same level as your heart  Follow the directions that came with your blood pressure monitor  Check your blood pressure 2 times, 1 minute apart, before you take your medicine in the morning  Also check your blood pressure before your evening meal  Keep a record of your readings and bring it to your follow-up visits  Ask your healthcare provider what your blood pressure should be  · Manage any other health conditions you have  Health conditions such as diabetes can increase your risk for hypertension  Follow your healthcare provider's instructions and take all your medicines as directed  · Ask about all medicines  Certain medicines can increase your blood pressure  Examples include oral birth control pills, decongestants, herbal supplements, and NSAIDs, such as ibuprofen   Your healthcare provider can tell you which medicines are safe for you to take  This includes prescription and over-the-counter medicines  Lifestyle changes you can make to manage hypertension:  Your healthcare provider may recommend you work with a team to manage hypertension  The team may include medical experts such as a dietitian, an exercise or physical therapist, and a behavior therapist  Your family members may be included in helping you create lifestyle changes  · Limit sodium (salt) as directed  Too much sodium can affect your fluid balance  Check labels to find low-sodium or no-salt-added foods  Some low-sodium foods use potassium salts for flavor  Too much potassium can also cause health problems  Your healthcare provider will tell you how much sodium and potassium are safe for you to have in a day  He or she may recommend that you limit sodium to 2,300 mg a day  · Follow the meal plan recommended by your healthcare provider  A dietitian or your provider can give you more information on low-sodium plans or the DASH (Dietary Approaches to Stop Hypertension) eating plan  The DASH plan is low in sodium, processed sugar, unhealthy fats, and total fat  It is high in potassium, calcium, and fiber  These can be found in vegetables, fruit, and whole-grain foods  · Be physically active throughout the day  Physical activity, such as exercise, can help control your blood pressure and your weight  Be physically active for at least 30 minutes per day, on most days of the week  Include aerobic activity, such as walking or riding a bicycle  Also include strength training at least 2 times each week  Your healthcare providers can help you create a physical activity plan  · Decrease stress  This may help lower your blood pressure  Learn ways to relax, such as deep breathing or listening to music  · Limit alcohol as directed  Alcohol can increase your blood pressure   A drink of alcohol is 12 ounces of beer, 5 ounces of wine, or 1½ ounces of liquor  · Do not smoke  Nicotine and other chemicals in cigarettes and cigars can increase your blood pressure and also cause lung damage  Ask your healthcare provider for information if you currently smoke and need help to quit  E-cigarettes or smokeless tobacco still contain nicotine  Talk to your healthcare provider before you use these products  Follow up with your doctor as directed: You will need to return to have your blood pressure checked and to have other lab tests done  Write down your questions so you remember to ask them during your visits  © Copyright [employer] 2021 Information is for End User's use only and may not be sold, redistributed or otherwise used for commercial purposes  All illustrations and images included in CareNotes® are the copyrighted property of [employer]  or [facility] [employer]   The above information is an  only  It is not intended as medical advice for individual conditions or treatments  Talk to your doctor, nurse or pharmacist before following any medical regimen to see if it is safe and effective for you

## 2021-08-04 ENCOUNTER — TELEPHONE (OUTPATIENT)
Dept: NEPHROLOGY | Facility: CLINIC | Age: 61
End: 2021-08-04

## 2021-08-04 LAB
CREAT UR-MCNC: 101 MG/DL
CREAT UR-MCNC: 105 MG/DL
MICROALBUMIN UR-MCNC: 5510 MG/L (ref 0–20)
MICROALBUMIN/CREAT 24H UR: 5248 MG/G CREATININE (ref 0–30)
PROT UR-MCNC: 580 MG/DL
PROT/CREAT UR: 5.74 MG/G{CREAT} (ref 0–0.1)

## 2021-08-04 NOTE — TELEPHONE ENCOUNTER
----- Message from Ambrosio Gaston MD sent at 8/4/2021  7:45 AM EDT -----  Please call and let her know creatinine a little better then last one and likely at baseline   Will review when I see her   ----- Message -----  From: Lab, Background User  Sent: 8/3/2021   5:50 PM EDT  To: Ambrosio Gaston MD

## 2021-08-04 NOTE — RESULT ENCOUNTER NOTE
This patient has upcoming appointment, no urgent lab results, will review with patient at that time  Patient has massive proteinuria, for which she is following with Nephrology  A1c improved to 7 1%  Will discuss in detail  during appointment

## 2021-08-05 ENCOUNTER — TELEPHONE (OUTPATIENT)
Dept: FAMILY MEDICINE CLINIC | Facility: CLINIC | Age: 61
End: 2021-08-05

## 2021-08-05 NOTE — TELEPHONE ENCOUNTER
SIGNATURES NEEDED FOR  [facility]  FORM RECEIVED VIA FAX  WILL BE PLACED IN FORM BIN FOR MA PICKUP

## 2021-08-06 DIAGNOSIS — E03.9 HYPOTHYROIDISM, UNSPECIFIED TYPE: ICD-10-CM

## 2021-08-06 LAB
ALBUMIN SERPL ELPH-MCNC: 3.08 G/DL (ref 3.5–5)
ALBUMIN SERPL ELPH-MCNC: 54.1 % (ref 52–65)
ALBUMIN UR ELPH-MCNC: 81.9 %
ALPHA1 GLOB MFR UR ELPH: 4.3 %
ALPHA1 GLOB SERPL ELPH-MCNC: 0.38 G/DL (ref 0.1–0.4)
ALPHA1 GLOB SERPL ELPH-MCNC: 6.7 % (ref 2.5–5)
ALPHA2 GLOB MFR UR ELPH: 4.3 %
ALPHA2 GLOB SERPL ELPH-MCNC: 1.13 G/DL (ref 0.4–1.2)
ALPHA2 GLOB SERPL ELPH-MCNC: 19.8 % (ref 7–13)
B-GLOBULIN MFR UR ELPH: 5.9 %
BETA GLOB ABNORMAL SERPL ELPH-MCNC: 0.3 G/DL (ref 0.4–0.8)
BETA1 GLOB SERPL ELPH-MCNC: 5.3 % (ref 5–13)
BETA2 GLOB SERPL ELPH-MCNC: 5.7 % (ref 2–8)
BETA2+GAMMA GLOB SERPL ELPH-MCNC: 0.32 G/DL (ref 0.2–0.5)
GAMMA GLOB ABNORMAL SERPL ELPH-MCNC: 0.48 G/DL (ref 0.5–1.6)
GAMMA GLOB MFR UR ELPH: 3.6 %
GAMMA GLOB SERPL ELPH-MCNC: 8.4 % (ref 12–22)
IGG/ALB SER: 1.18 {RATIO} (ref 1.1–1.8)
INTERPRETATION UR IFE-IMP: NORMAL
PROT PATTERN SERPL ELPH-IMP: ABNORMAL
PROT PATTERN UR ELPH-IMP: ABNORMAL
PROT SERPL-MCNC: 5.7 G/DL (ref 6.4–8.2)
PROT UR-MCNC: 593 MG/DL

## 2021-08-06 RX ORDER — LEVOTHYROXINE SODIUM 0.05 MG/1
TABLET ORAL
Qty: 60 TABLET | Refills: 0 | Status: SHIPPED | OUTPATIENT
Start: 2021-08-06 | End: 2021-10-11

## 2021-08-09 ENCOUNTER — TELEPHONE (OUTPATIENT)
Dept: NEPHROLOGY | Facility: CLINIC | Age: 61
End: 2021-08-09

## 2021-08-09 NOTE — TELEPHONE ENCOUNTER
Spoke with patient and made her aware recent labs results creatinine has improved a bit to 2 2  No changes at this time  Patient has no questions or concerns  Patient has apt scheduled for 9/14

## 2021-08-09 NOTE — TELEPHONE ENCOUNTER
----- Message from Mart Brar MD sent at 8/7/2021  9:46 AM EDT -----  Let her know creatinine was a little lower or better at 2 2  When is she on call back?

## 2021-08-12 ENCOUNTER — TELEPHONE (OUTPATIENT)
Dept: CARDIOLOGY CLINIC | Facility: CLINIC | Age: 61
End: 2021-08-12

## 2021-08-12 NOTE — TELEPHONE ENCOUNTER
Phone call for [facility]  She had faxed our office a cardiac risk assessment form 8/6/21  Patient sched for left cataract surgery Monday and need status of risk assessment  [place] texted Dr Marli Portillo  Per Dr Marli Portillo:   I wouldnt clear her to stop her meds or to have eye surgery right now    Notified [address] [facility] and informed Samira Ort I would have Dr Marli Portillo complete risk assessment tomorrow (Friday) when he is in office again

## 2021-08-13 ENCOUNTER — PATIENT OUTREACH (OUTPATIENT)
Dept: FAMILY MEDICINE CLINIC | Facility: CLINIC | Age: 61
End: 2021-08-13

## 2021-08-13 DIAGNOSIS — E11.42 TYPE 2 DIABETES MELLITUS WITH DIABETIC POLYNEUROPATHY, WITH LONG-TERM CURRENT USE OF INSULIN (HCC): ICD-10-CM

## 2021-08-13 DIAGNOSIS — Z79.4 CURRENT USE OF INSULIN (HCC): ICD-10-CM

## 2021-08-13 DIAGNOSIS — Z79.4 TYPE 2 DIABETES MELLITUS WITH DIABETIC POLYNEUROPATHY, WITH LONG-TERM CURRENT USE OF INSULIN (HCC): ICD-10-CM

## 2021-08-13 DIAGNOSIS — N18.32 STAGE 3B CHRONIC KIDNEY DISEASE (HCC): ICD-10-CM

## 2021-08-13 DIAGNOSIS — I25.10 CORONARY ARTERY DISEASE INVOLVING NATIVE HEART WITHOUT ANGINA PECTORIS, UNSPECIFIED VESSEL OR LESION TYPE: ICD-10-CM

## 2021-08-13 RX ORDER — CARVEDILOL 25 MG/1
TABLET ORAL
Qty: 60 TABLET | Refills: 0 | Status: SHIPPED | OUTPATIENT
Start: 2021-08-13 | End: 2021-09-13

## 2021-08-13 NOTE — PROGRESS NOTES
I called the patient to follow up  She reports her eye surgery was cancelled because Cardiology would not clear her  The patient is very upset about this  She notes again that her eyeglasses have only 1 lens as the other one popped out and cannot be located  She stated she will call Cards to see if she can have the cardiac cath rescheduled  She is willing to do what it takes in order to be cleared for the eye surgery  In the meantime I asked that she proceed with getting new eyeglasses since we do not know when her eye surgery will take place  The patient reports her fasting blood sugar was 103 this morning and is happy to finally have it under control  I will continue to follow

## 2021-08-17 ENCOUNTER — TELEPHONE (OUTPATIENT)
Dept: NEPHROLOGY | Facility: CLINIC | Age: 61
End: 2021-08-17

## 2021-08-17 NOTE — TELEPHONE ENCOUNTER
Patient has follow up scheduled with dr Donny Carmen on 9/14  Patient called stating she needs clearance from nephrology for cardiac cath placement  I have attempted to move patient's apt up but unfortunately Dr Donny Carmen has no availability sooner than 9/14  Made patient aware I will reach out to Dr Donny Carmen to discuss what he would like to do in order to determine clearance  Patient will await phone call

## 2021-08-20 NOTE — TELEPHONE ENCOUNTER
Patient called asking for an update  I have relayed the message that she does not need a sooner appointment and She had stated that her cardiologist is Dr Gayathri Nguyen  I also informed her that her creatinine is slightly better than before

## 2021-08-21 DIAGNOSIS — E11.621 TYPE 2 DIABETES MELLITUS WITH FOOT ULCER, WITH LONG-TERM CURRENT USE OF INSULIN (HCC): ICD-10-CM

## 2021-08-21 DIAGNOSIS — L97.509 TYPE 2 DIABETES MELLITUS WITH FOOT ULCER, WITH LONG-TERM CURRENT USE OF INSULIN (HCC): ICD-10-CM

## 2021-08-21 DIAGNOSIS — Z79.4 TYPE 2 DIABETES MELLITUS WITH FOOT ULCER, WITH LONG-TERM CURRENT USE OF INSULIN (HCC): ICD-10-CM

## 2021-08-23 ENCOUNTER — PATIENT OUTREACH (OUTPATIENT)
Dept: FAMILY MEDICINE CLINIC | Facility: CLINIC | Age: 61
End: 2021-08-23

## 2021-08-23 ENCOUNTER — APPOINTMENT (EMERGENCY)
Dept: RADIOLOGY | Facility: HOSPITAL | Age: 61
DRG: 698 | End: 2021-08-23
Payer: COMMERCIAL

## 2021-08-23 ENCOUNTER — HOSPITAL ENCOUNTER (INPATIENT)
Facility: HOSPITAL | Age: 61
LOS: 6 days | DRG: 698 | End: 2021-08-29
Attending: EMERGENCY MEDICINE | Admitting: FAMILY MEDICINE
Payer: COMMERCIAL

## 2021-08-23 DIAGNOSIS — I50.9 ACUTE EXACERBATION OF CHF (CONGESTIVE HEART FAILURE) (HCC): ICD-10-CM

## 2021-08-23 DIAGNOSIS — N18.9 CHRONIC KIDNEY DISEASE: ICD-10-CM

## 2021-08-23 DIAGNOSIS — I50.83 HIGH OUTPUT CONGESTIVE HEART FAILURE (HCC): ICD-10-CM

## 2021-08-23 DIAGNOSIS — I10 ESSENTIAL HYPERTENSION: ICD-10-CM

## 2021-08-23 DIAGNOSIS — R06.00 DYSPNEA ON EXERTION: ICD-10-CM

## 2021-08-23 DIAGNOSIS — N39.0 UTI (URINARY TRACT INFECTION): ICD-10-CM

## 2021-08-23 DIAGNOSIS — N18.4 CKD (CHRONIC KIDNEY DISEASE) STAGE 4, GFR 15-29 ML/MIN (HCC): ICD-10-CM

## 2021-08-23 DIAGNOSIS — I25.10 CORONARY ARTERY DISEASE INVOLVING NATIVE HEART WITHOUT ANGINA PECTORIS, UNSPECIFIED VESSEL OR LESION TYPE: ICD-10-CM

## 2021-08-23 DIAGNOSIS — I50.9 CHF (CONGESTIVE HEART FAILURE) (HCC): Primary | ICD-10-CM

## 2021-08-23 DIAGNOSIS — N18.32 STAGE 3B CHRONIC KIDNEY DISEASE (HCC): ICD-10-CM

## 2021-08-23 PROBLEM — T83.510A URINARY TRACT INFECTION ASSOCIATED WITH CYSTOSTOMY CATHETER (HCC): Status: ACTIVE | Noted: 2021-08-23

## 2021-08-23 PROBLEM — N31.9 NEUROGENIC BLADDER: Status: ACTIVE | Noted: 2021-08-23

## 2021-08-23 LAB
ALBUMIN SERPL BCP-MCNC: 3.6 G/DL (ref 3–5.2)
ALP SERPL-CCNC: 98 U/L (ref 43–122)
ALT SERPL W P-5'-P-CCNC: 16 U/L
ANION GAP SERPL CALCULATED.3IONS-SCNC: 8 MMOL/L (ref 5–14)
APTT PPP: 33 SECONDS (ref 23–37)
AST SERPL W P-5'-P-CCNC: 24 U/L (ref 14–36)
BACTERIA UR QL AUTO: ABNORMAL /HPF
BASOPHILS # BLD AUTO: 0.1 THOUSANDS/ΜL (ref 0–0.1)
BASOPHILS NFR BLD AUTO: 1 % (ref 0–1)
BILIRUB SERPL-MCNC: 0.33 MG/DL
BILIRUB UR QL STRIP: NEGATIVE
BUN SERPL-MCNC: 56 MG/DL (ref 5–25)
CALCIUM SERPL-MCNC: 8.3 MG/DL (ref 8.4–10.2)
CHLORIDE SERPL-SCNC: 111 MMOL/L (ref 97–108)
CLARITY UR: ABNORMAL
CO2 SERPL-SCNC: 22 MMOL/L (ref 22–30)
COARSE GRAN CASTS URNS QL MICRO: ABNORMAL /LPF
COLOR UR: YELLOW
CREAT SERPL-MCNC: 2.37 MG/DL (ref 0.6–1.2)
EOSINOPHIL # BLD AUTO: 0.3 THOUSAND/ΜL (ref 0–0.4)
EOSINOPHIL NFR BLD AUTO: 3 % (ref 0–6)
ERYTHROCYTE [DISTWIDTH] IN BLOOD BY AUTOMATED COUNT: 17 %
FLUAV RNA RESP QL NAA+PROBE: NEGATIVE
FLUBV RNA RESP QL NAA+PROBE: NEGATIVE
GFR SERPL CREATININE-BSD FRML MDRD: 22 ML/MIN/1.73SQ M
GLUCOSE SERPL-MCNC: 52 MG/DL (ref 70–99)
GLUCOSE UR STRIP-MCNC: NEGATIVE MG/DL
HCT VFR BLD AUTO: 25.7 % (ref 36–46)
HGB BLD-MCNC: 8.6 G/DL (ref 12–16)
HGB UR QL STRIP.AUTO: 250
INR PPP: 1.03 (ref 0.84–1.19)
KETONES UR STRIP-MCNC: NEGATIVE MG/DL
LEUKOCYTE ESTERASE UR QL STRIP: 500
LYMPHOCYTES # BLD AUTO: 1.4 THOUSANDS/ΜL (ref 0.5–4)
LYMPHOCYTES NFR BLD AUTO: 12 % (ref 25–45)
MAGNESIUM SERPL-MCNC: 2 MG/DL (ref 1.6–2.3)
MCH RBC QN AUTO: 32.2 PG (ref 26–34)
MCHC RBC AUTO-ENTMCNC: 33.3 G/DL (ref 31–36)
MCV RBC AUTO: 97 FL (ref 80–100)
MONOCYTES # BLD AUTO: 0.9 THOUSAND/ΜL (ref 0.2–0.9)
MONOCYTES NFR BLD AUTO: 8 % (ref 1–10)
MUCOUS THREADS UR QL AUTO: ABNORMAL
NEUTROPHILS # BLD AUTO: 8.4 THOUSANDS/ΜL (ref 1.8–7.8)
NEUTS SEG NFR BLD AUTO: 76 % (ref 45–65)
NITRITE UR QL STRIP: POSITIVE
NON-SQ EPI CELLS URNS QL MICRO: ABNORMAL /HPF
NT-PROBNP SERPL-MCNC: 985 PG/ML (ref 0–299)
PH UR STRIP.AUTO: 6 [PH]
PLATELET # BLD AUTO: 230 THOUSANDS/UL (ref 150–450)
PMV BLD AUTO: 7.2 FL (ref 8.9–12.7)
POTASSIUM SERPL-SCNC: 4.9 MMOL/L (ref 3.6–5)
PROT SERPL-MCNC: 6.4 G/DL (ref 5.9–8.4)
PROT UR STRIP-MCNC: >=500 MG/DL
PROTHROMBIN TIME: 13.6 SECONDS (ref 11.6–14.5)
RBC # BLD AUTO: 2.66 MILLION/UL (ref 4–5.2)
RBC #/AREA URNS AUTO: ABNORMAL /HPF
RSV RNA RESP QL NAA+PROBE: NEGATIVE
SARS-COV-2 RNA RESP QL NAA+PROBE: NEGATIVE
SODIUM SERPL-SCNC: 141 MMOL/L (ref 137–147)
SP GR UR STRIP.AUTO: 1.02 (ref 1–1.04)
TROPONIN I SERPL-MCNC: <0.01 NG/ML (ref 0–0.03)
UROBILINOGEN UA: NEGATIVE MG/DL
WBC # BLD AUTO: 11 THOUSAND/UL (ref 4.5–11)
WBC #/AREA URNS AUTO: ABNORMAL /HPF

## 2021-08-23 PROCEDURE — 81003 URINALYSIS AUTO W/O SCOPE: CPT | Performed by: PHYSICIAN ASSISTANT

## 2021-08-23 PROCEDURE — 87077 CULTURE AEROBIC IDENTIFY: CPT | Performed by: PHYSICIAN ASSISTANT

## 2021-08-23 PROCEDURE — 36415 COLL VENOUS BLD VENIPUNCTURE: CPT | Performed by: PHYSICIAN ASSISTANT

## 2021-08-23 PROCEDURE — 85730 THROMBOPLASTIN TIME PARTIAL: CPT | Performed by: PHYSICIAN ASSISTANT

## 2021-08-23 PROCEDURE — 85610 PROTHROMBIN TIME: CPT | Performed by: PHYSICIAN ASSISTANT

## 2021-08-23 PROCEDURE — 0241U HB NFCT DS VIR RESP RNA 4 TRGT: CPT | Performed by: PHYSICIAN ASSISTANT

## 2021-08-23 PROCEDURE — 93005 ELECTROCARDIOGRAM TRACING: CPT

## 2021-08-23 PROCEDURE — 83735 ASSAY OF MAGNESIUM: CPT | Performed by: PHYSICIAN ASSISTANT

## 2021-08-23 PROCEDURE — 71045 X-RAY EXAM CHEST 1 VIEW: CPT

## 2021-08-23 PROCEDURE — 99285 EMERGENCY DEPT VISIT HI MDM: CPT | Performed by: PHYSICIAN ASSISTANT

## 2021-08-23 PROCEDURE — 99285 EMERGENCY DEPT VISIT HI MDM: CPT

## 2021-08-23 PROCEDURE — 96374 THER/PROPH/DIAG INJ IV PUSH: CPT

## 2021-08-23 PROCEDURE — 83880 ASSAY OF NATRIURETIC PEPTIDE: CPT | Performed by: PHYSICIAN ASSISTANT

## 2021-08-23 PROCEDURE — 87186 SC STD MICRODIL/AGAR DIL: CPT | Performed by: PHYSICIAN ASSISTANT

## 2021-08-23 PROCEDURE — 87086 URINE CULTURE/COLONY COUNT: CPT | Performed by: PHYSICIAN ASSISTANT

## 2021-08-23 PROCEDURE — 85025 COMPLETE CBC W/AUTO DIFF WBC: CPT | Performed by: PHYSICIAN ASSISTANT

## 2021-08-23 PROCEDURE — 81001 URINALYSIS AUTO W/SCOPE: CPT | Performed by: PHYSICIAN ASSISTANT

## 2021-08-23 PROCEDURE — 84484 ASSAY OF TROPONIN QUANT: CPT | Performed by: PHYSICIAN ASSISTANT

## 2021-08-23 PROCEDURE — 80053 COMPREHEN METABOLIC PANEL: CPT | Performed by: PHYSICIAN ASSISTANT

## 2021-08-23 RX ORDER — NITROGLYCERIN 0.4 MG/1
0.4 TABLET SUBLINGUAL
Status: DISCONTINUED | OUTPATIENT
Start: 2021-08-23 | End: 2021-08-29 | Stop reason: HOSPADM

## 2021-08-23 RX ORDER — ISOSORBIDE MONONITRATE 30 MG/1
30 TABLET, EXTENDED RELEASE ORAL DAILY
Status: DISCONTINUED | OUTPATIENT
Start: 2021-08-24 | End: 2021-08-29 | Stop reason: HOSPADM

## 2021-08-23 RX ORDER — CLOPIDOGREL BISULFATE 75 MG/1
75 TABLET ORAL DAILY
Status: DISCONTINUED | OUTPATIENT
Start: 2021-08-24 | End: 2021-08-29 | Stop reason: HOSPADM

## 2021-08-23 RX ORDER — HEPARIN SODIUM 5000 [USP'U]/ML
5000 INJECTION, SOLUTION INTRAVENOUS; SUBCUTANEOUS EVERY 8 HOURS SCHEDULED
Status: DISCONTINUED | OUTPATIENT
Start: 2021-08-24 | End: 2021-08-29 | Stop reason: HOSPADM

## 2021-08-23 RX ORDER — AMLODIPINE BESYLATE 5 MG/1
5 TABLET ORAL DAILY
Status: DISCONTINUED | OUTPATIENT
Start: 2021-08-24 | End: 2021-08-25

## 2021-08-23 RX ORDER — INSULIN GLARGINE 100 [IU]/ML
50 INJECTION, SOLUTION SUBCUTANEOUS EVERY 12 HOURS SCHEDULED
Qty: 30 ML | Refills: 1 | Status: SHIPPED | OUTPATIENT
Start: 2021-08-23 | End: 2021-11-30 | Stop reason: SDUPTHER

## 2021-08-23 RX ORDER — FUROSEMIDE 10 MG/ML
40 INJECTION INTRAMUSCULAR; INTRAVENOUS ONCE
Status: COMPLETED | OUTPATIENT
Start: 2021-08-23 | End: 2021-08-23

## 2021-08-23 RX ORDER — INSULIN GLARGINE 100 [IU]/ML
50 INJECTION, SOLUTION SUBCUTANEOUS EVERY 12 HOURS SCHEDULED
Status: DISCONTINUED | OUTPATIENT
Start: 2021-08-24 | End: 2021-08-24

## 2021-08-23 RX ORDER — ATORVASTATIN CALCIUM 40 MG/1
40 TABLET, FILM COATED ORAL
Status: DISCONTINUED | OUTPATIENT
Start: 2021-08-24 | End: 2021-08-29 | Stop reason: HOSPADM

## 2021-08-23 RX ORDER — ASPIRIN 81 MG/1
81 TABLET ORAL DAILY
Status: DISCONTINUED | OUTPATIENT
Start: 2021-08-24 | End: 2021-08-29 | Stop reason: HOSPADM

## 2021-08-23 RX ORDER — CEFTRIAXONE 1 G/50ML
1000 INJECTION, SOLUTION INTRAVENOUS ONCE
Status: COMPLETED | OUTPATIENT
Start: 2021-08-23 | End: 2021-08-23

## 2021-08-23 RX ORDER — DOCUSATE SODIUM 100 MG/1
100 CAPSULE, LIQUID FILLED ORAL 2 TIMES DAILY
Status: DISCONTINUED | OUTPATIENT
Start: 2021-08-24 | End: 2021-08-29 | Stop reason: HOSPADM

## 2021-08-23 RX ORDER — ALLOPURINOL 300 MG/1
300 TABLET ORAL DAILY
Status: DISCONTINUED | OUTPATIENT
Start: 2021-08-24 | End: 2021-08-24

## 2021-08-23 RX ORDER — LEVOTHYROXINE SODIUM 0.05 MG/1
50 TABLET ORAL DAILY
Status: DISCONTINUED | OUTPATIENT
Start: 2021-08-24 | End: 2021-08-29 | Stop reason: HOSPADM

## 2021-08-23 RX ADMIN — CEFTRIAXONE 1000 MG: 1 INJECTION, SOLUTION INTRAVENOUS at 22:30

## 2021-08-23 RX ADMIN — FUROSEMIDE 40 MG: 10 INJECTION, SOLUTION INTRAVENOUS at 20:55

## 2021-08-23 NOTE — PROGRESS NOTES
I received a call from the patient asking for a Lantus refill  She states she ran out yesterday and today her blood sugar was >200  After chart review, I see the medication was already filled earlier today  The patient reports her left hand is swollen; denies any fall  She also reports her legs are "puffy for a few days"  The patient denies chest pain but notes she has shortness of breath with exertion  She notes she was "panting" after walking the stairs  She states she took a Nitro "to calm down"  The patient notes HCTZ was discontinued due to worsening kidney function; will send note to PCP  The patient had no conversational dyspnea  I asked the patient to elevate her legs and keep a strict watch on low sodium foods and she agreed  I will continue to follow

## 2021-08-23 NOTE — PROGRESS NOTES
I called the patient to inform her to go to the ED per PCP instruction  She states she will be going

## 2021-08-24 ENCOUNTER — APPOINTMENT (INPATIENT)
Dept: NON INVASIVE DIAGNOSTICS | Facility: HOSPITAL | Age: 61
DRG: 698 | End: 2021-08-24
Payer: COMMERCIAL

## 2021-08-24 PROBLEM — I50.9 ACUTE EXACERBATION OF CHF (CONGESTIVE HEART FAILURE) (HCC): Status: ACTIVE | Noted: 2021-08-24

## 2021-08-24 PROBLEM — N18.4 ANEMIA DUE TO STAGE 4 CHRONIC KIDNEY DISEASE (HCC): Status: ACTIVE | Noted: 2021-08-24

## 2021-08-24 PROBLEM — D63.1 ANEMIA DUE TO STAGE 4 CHRONIC KIDNEY DISEASE (HCC): Status: RESOLVED | Noted: 2021-08-24 | Resolved: 2021-08-24

## 2021-08-24 PROBLEM — N18.4 ANEMIA DUE TO STAGE 4 CHRONIC KIDNEY DISEASE (HCC): Status: RESOLVED | Noted: 2021-08-24 | Resolved: 2021-08-24

## 2021-08-24 PROBLEM — D63.1 ANEMIA DUE TO STAGE 4 CHRONIC KIDNEY DISEASE (HCC): Status: ACTIVE | Noted: 2021-08-24

## 2021-08-24 PROBLEM — I50.83 HIGH OUTPUT CONGESTIVE HEART FAILURE (HCC): Status: RESOLVED | Noted: 2021-08-23 | Resolved: 2021-08-24

## 2021-08-24 PROBLEM — E83.51 HYPOCALCEMIA: Status: ACTIVE | Noted: 2021-08-24

## 2021-08-24 PROBLEM — R94.31 PROLONGED QT INTERVAL: Status: ACTIVE | Noted: 2021-08-24

## 2021-08-24 LAB
25(OH)D3 SERPL-MCNC: 23.6 NG/ML (ref 30–100)
ALBUMIN SERPL BCP-MCNC: 3.5 G/DL (ref 3–5.2)
ALP SERPL-CCNC: 100 U/L (ref 43–122)
ALT SERPL W P-5'-P-CCNC: 16 U/L
ANION GAP SERPL CALCULATED.3IONS-SCNC: 8 MMOL/L (ref 5–14)
AST SERPL W P-5'-P-CCNC: 35 U/L (ref 14–36)
ATRIAL RATE: 71 BPM
ATRIAL RATE: 73 BPM
ATRIAL RATE: 73 BPM
ATRIAL RATE: 74 BPM
BASOPHILS # BLD AUTO: 0.1 THOUSANDS/ΜL (ref 0–0.1)
BASOPHILS NFR BLD AUTO: 1 % (ref 0–1)
BILIRUB SERPL-MCNC: 0.34 MG/DL
BUN SERPL-MCNC: 57 MG/DL (ref 5–25)
CA-I BLD-SCNC: 1.09 MMOL/L (ref 1.12–1.32)
CALCIUM SERPL-MCNC: 8.3 MG/DL (ref 8.4–10.2)
CHLORIDE SERPL-SCNC: 109 MMOL/L (ref 97–108)
CO2 SERPL-SCNC: 22 MMOL/L (ref 22–30)
CREAT SERPL-MCNC: 2.35 MG/DL (ref 0.6–1.2)
EOSINOPHIL # BLD AUTO: 0.3 THOUSAND/ΜL (ref 0–0.4)
EOSINOPHIL NFR BLD AUTO: 3 % (ref 0–6)
ERYTHROCYTE [DISTWIDTH] IN BLOOD BY AUTOMATED COUNT: 16.7 %
FERRITIN SERPL-MCNC: 44 NG/ML (ref 8–388)
GFR SERPL CREATININE-BSD FRML MDRD: 22 ML/MIN/1.73SQ M
GLUCOSE SERPL-MCNC: 155 MG/DL (ref 65–140)
GLUCOSE SERPL-MCNC: 155 MG/DL (ref 70–99)
GLUCOSE SERPL-MCNC: 167 MG/DL (ref 65–140)
GLUCOSE SERPL-MCNC: 212 MG/DL (ref 65–140)
GLUCOSE SERPL-MCNC: 244 MG/DL (ref 65–140)
GLUCOSE SERPL-MCNC: 251 MG/DL (ref 65–140)
HCT VFR BLD AUTO: 25.1 % (ref 36–46)
HGB BLD-MCNC: 8.5 G/DL (ref 12–16)
IRON SATN MFR SERPL: 20 %
IRON SERPL-MCNC: 46 UG/DL (ref 50–170)
LYMPHOCYTES # BLD AUTO: 1.7 THOUSANDS/ΜL (ref 0.5–4)
LYMPHOCYTES NFR BLD AUTO: 18 % (ref 25–45)
MAGNESIUM SERPL-MCNC: 2.2 MG/DL (ref 1.6–2.3)
MCH RBC QN AUTO: 32.4 PG (ref 26–34)
MCHC RBC AUTO-ENTMCNC: 33.9 G/DL (ref 31–36)
MCV RBC AUTO: 96 FL (ref 80–100)
MONOCYTES # BLD AUTO: 0.7 THOUSAND/ΜL (ref 0.2–0.9)
MONOCYTES NFR BLD AUTO: 7 % (ref 1–10)
NEUTROPHILS # BLD AUTO: 6.8 THOUSANDS/ΜL (ref 1.8–7.8)
NEUTS SEG NFR BLD AUTO: 71 % (ref 45–65)
P AXIS: 18 DEGREES
P AXIS: 35 DEGREES
P AXIS: 38 DEGREES
P AXIS: 5 DEGREES
PHOSPHATE SERPL-MCNC: 5.1 MG/DL (ref 2.5–4.8)
PLATELET # BLD AUTO: 220 THOUSANDS/UL (ref 150–450)
PMV BLD AUTO: 7.6 FL (ref 8.9–12.7)
POTASSIUM SERPL-SCNC: 4.5 MMOL/L (ref 3.6–5)
PR INTERVAL: 142 MS
PR INTERVAL: 152 MS
PR INTERVAL: 156 MS
PR INTERVAL: 162 MS
PROT SERPL-MCNC: 6.2 G/DL (ref 5.9–8.4)
PTH-INTACT SERPL-MCNC: 107.6 PG/ML (ref 18.4–80.1)
QRS AXIS: -5 DEGREES
QRS AXIS: -8 DEGREES
QRS AXIS: -8 DEGREES
QRS AXIS: 8 DEGREES
QRSD INTERVAL: 144 MS
QRSD INTERVAL: 152 MS
QT INTERVAL: 446 MS
QT INTERVAL: 446 MS
QT INTERVAL: 448 MS
QT INTERVAL: 450 MS
QTC INTERVAL: 486 MS
QTC INTERVAL: 491 MS
QTC INTERVAL: 495 MS
QTC INTERVAL: 495 MS
RBC # BLD AUTO: 2.62 MILLION/UL (ref 4–5.2)
SODIUM SERPL-SCNC: 139 MMOL/L (ref 137–147)
T WAVE AXIS: 38 DEGREES
T WAVE AXIS: 69 DEGREES
T WAVE AXIS: 75 DEGREES
T WAVE AXIS: 83 DEGREES
TIBC SERPL-MCNC: 234 UG/DL (ref 250–450)
TROPONIN I SERPL-MCNC: <0.01 NG/ML (ref 0–0.03)
TROPONIN I SERPL-MCNC: <0.01 NG/ML (ref 0–0.03)
VENTRICULAR RATE: 71 BPM
VENTRICULAR RATE: 73 BPM
VENTRICULAR RATE: 73 BPM
VENTRICULAR RATE: 74 BPM
WBC # BLD AUTO: 9.6 THOUSAND/UL (ref 4.5–11)

## 2021-08-24 PROCEDURE — 93005 ELECTROCARDIOGRAM TRACING: CPT

## 2021-08-24 PROCEDURE — 85025 COMPLETE CBC W/AUTO DIFF WBC: CPT | Performed by: STUDENT IN AN ORGANIZED HEALTH CARE EDUCATION/TRAINING PROGRAM

## 2021-08-24 PROCEDURE — 99222 1ST HOSP IP/OBS MODERATE 55: CPT

## 2021-08-24 PROCEDURE — 83540 ASSAY OF IRON: CPT | Performed by: FAMILY MEDICINE

## 2021-08-24 PROCEDURE — 84484 ASSAY OF TROPONIN QUANT: CPT | Performed by: STUDENT IN AN ORGANIZED HEALTH CARE EDUCATION/TRAINING PROGRAM

## 2021-08-24 PROCEDURE — 82306 VITAMIN D 25 HYDROXY: CPT

## 2021-08-24 PROCEDURE — 83550 IRON BINDING TEST: CPT | Performed by: FAMILY MEDICINE

## 2021-08-24 PROCEDURE — 99223 1ST HOSP IP/OBS HIGH 75: CPT | Performed by: FAMILY MEDICINE

## 2021-08-24 PROCEDURE — 93306 TTE W/DOPPLER COMPLETE: CPT

## 2021-08-24 PROCEDURE — 83970 ASSAY OF PARATHORMONE: CPT | Performed by: FAMILY MEDICINE

## 2021-08-24 PROCEDURE — 93010 ELECTROCARDIOGRAM REPORT: CPT

## 2021-08-24 PROCEDURE — 83735 ASSAY OF MAGNESIUM: CPT | Performed by: STUDENT IN AN ORGANIZED HEALTH CARE EDUCATION/TRAINING PROGRAM

## 2021-08-24 PROCEDURE — 97167 OT EVAL HIGH COMPLEX 60 MIN: CPT

## 2021-08-24 PROCEDURE — 80053 COMPREHEN METABOLIC PANEL: CPT | Performed by: STUDENT IN AN ORGANIZED HEALTH CARE EDUCATION/TRAINING PROGRAM

## 2021-08-24 PROCEDURE — 84100 ASSAY OF PHOSPHORUS: CPT | Performed by: STUDENT IN AN ORGANIZED HEALTH CARE EDUCATION/TRAINING PROGRAM

## 2021-08-24 PROCEDURE — 82728 ASSAY OF FERRITIN: CPT | Performed by: FAMILY MEDICINE

## 2021-08-24 PROCEDURE — 82330 ASSAY OF CALCIUM: CPT | Performed by: STUDENT IN AN ORGANIZED HEALTH CARE EDUCATION/TRAINING PROGRAM

## 2021-08-24 PROCEDURE — 82948 REAGENT STRIP/BLOOD GLUCOSE: CPT

## 2021-08-24 RX ORDER — FUROSEMIDE 10 MG/ML
40 INJECTION INTRAMUSCULAR; INTRAVENOUS ONCE
Status: COMPLETED | OUTPATIENT
Start: 2021-08-24 | End: 2021-08-24

## 2021-08-24 RX ORDER — CEFTRIAXONE 1 G/50ML
1000 INJECTION, SOLUTION INTRAVENOUS EVERY 24 HOURS
Status: DISCONTINUED | OUTPATIENT
Start: 2021-08-24 | End: 2021-08-28

## 2021-08-24 RX ORDER — INSULIN GLARGINE 100 [IU]/ML
30 INJECTION, SOLUTION SUBCUTANEOUS EVERY 12 HOURS SCHEDULED
Status: DISCONTINUED | OUTPATIENT
Start: 2021-08-24 | End: 2021-08-26

## 2021-08-24 RX ORDER — FUROSEMIDE 10 MG/ML
40 INJECTION INTRAMUSCULAR; INTRAVENOUS
Status: DISCONTINUED | OUTPATIENT
Start: 2021-08-24 | End: 2021-08-27

## 2021-08-24 RX ORDER — INSULIN GLARGINE 100 [IU]/ML
26 INJECTION, SOLUTION SUBCUTANEOUS EVERY 12 HOURS SCHEDULED
Status: DISCONTINUED | OUTPATIENT
Start: 2021-08-24 | End: 2021-08-24

## 2021-08-24 RX ORDER — CARVEDILOL 12.5 MG/1
25 TABLET ORAL 2 TIMES DAILY WITH MEALS
Status: DISCONTINUED | OUTPATIENT
Start: 2021-08-24 | End: 2021-08-29 | Stop reason: HOSPADM

## 2021-08-24 RX ORDER — HYDROCODONE BITARTRATE AND ACETAMINOPHEN 5; 325 MG/1; MG/1
1 TABLET ORAL 2 TIMES DAILY PRN
Status: DISCONTINUED | OUTPATIENT
Start: 2021-08-24 | End: 2021-08-29 | Stop reason: HOSPADM

## 2021-08-24 RX ORDER — CALCIUM GLUCONATE 20 MG/ML
1 INJECTION, SOLUTION INTRAVENOUS ONCE
Status: COMPLETED | OUTPATIENT
Start: 2021-08-24 | End: 2021-08-24

## 2021-08-24 RX ADMIN — INSULIN LISPRO 4 UNITS: 100 INJECTION, SOLUTION INTRAVENOUS; SUBCUTANEOUS at 12:11

## 2021-08-24 RX ADMIN — CEFTRIAXONE 1000 MG: 1 INJECTION, SOLUTION INTRAVENOUS at 22:10

## 2021-08-24 RX ADMIN — ASPIRIN 81 MG: 81 TABLET, COATED ORAL at 08:50

## 2021-08-24 RX ADMIN — CARVEDILOL 25 MG: 12.5 TABLET, FILM COATED ORAL at 08:57

## 2021-08-24 RX ADMIN — INSULIN LISPRO 2 UNITS: 100 INJECTION, SOLUTION INTRAVENOUS; SUBCUTANEOUS at 08:50

## 2021-08-24 RX ADMIN — DOCUSATE SODIUM 100 MG: 100 CAPSULE, LIQUID FILLED ORAL at 08:50

## 2021-08-24 RX ADMIN — HEPARIN SODIUM 5000 UNITS: 5000 INJECTION INTRAVENOUS; SUBCUTANEOUS at 06:16

## 2021-08-24 RX ADMIN — CARVEDILOL 25 MG: 12.5 TABLET, FILM COATED ORAL at 15:30

## 2021-08-24 RX ADMIN — DOCUSATE SODIUM 100 MG: 100 CAPSULE, LIQUID FILLED ORAL at 18:01

## 2021-08-24 RX ADMIN — AMLODIPINE BESYLATE 5 MG: 5 TABLET ORAL at 08:50

## 2021-08-24 RX ADMIN — ATORVASTATIN CALCIUM 40 MG: 40 TABLET, FILM COATED ORAL at 18:01

## 2021-08-24 RX ADMIN — FUROSEMIDE 40 MG: 10 INJECTION, SOLUTION INTRAVENOUS at 08:50

## 2021-08-24 RX ADMIN — ISOSORBIDE MONONITRATE 30 MG: 30 TABLET, EXTENDED RELEASE ORAL at 08:50

## 2021-08-24 RX ADMIN — CALCIUM GLUCONATE 1 G: 20 INJECTION, SOLUTION INTRAVENOUS at 18:01

## 2021-08-24 RX ADMIN — INSULIN GLARGINE 30 UNITS: 100 INJECTION, SOLUTION SUBCUTANEOUS at 22:10

## 2021-08-24 RX ADMIN — INSULIN LISPRO 6 UNITS: 100 INJECTION, SOLUTION INTRAVENOUS; SUBCUTANEOUS at 18:01

## 2021-08-24 RX ADMIN — HEPARIN SODIUM 5000 UNITS: 5000 INJECTION INTRAVENOUS; SUBCUTANEOUS at 00:29

## 2021-08-24 RX ADMIN — LEVOTHYROXINE SODIUM 50 MCG: 50 TABLET ORAL at 06:16

## 2021-08-24 RX ADMIN — HEPARIN SODIUM 5000 UNITS: 5000 INJECTION INTRAVENOUS; SUBCUTANEOUS at 22:10

## 2021-08-24 RX ADMIN — CLOPIDOGREL BISULFATE 75 MG: 75 TABLET ORAL at 08:50

## 2021-08-24 RX ADMIN — INSULIN GLARGINE 26 UNITS: 100 INJECTION, SOLUTION SUBCUTANEOUS at 08:49

## 2021-08-24 RX ADMIN — HYDROCODONE BITARTRATE AND ACETAMINOPHEN 1 TABLET: 5; 325 TABLET ORAL at 10:52

## 2021-08-24 RX ADMIN — FUROSEMIDE 40 MG: 10 INJECTION, SOLUTION INTRAVENOUS at 15:30

## 2021-08-24 RX ADMIN — HEPARIN SODIUM 5000 UNITS: 5000 INJECTION INTRAVENOUS; SUBCUTANEOUS at 15:28

## 2021-08-24 NOTE — H&P
History and Physical - Marcia Guadarrama    Patient Information: Jasmin Gentile 64 y o  female MRN: [id number]  Unit/Bed#: 7T [facility] 708-01 Encounter: [id number]  Admitting Physician: Jack Ramos MD  PCP: CHERELLE Phan  Date of Admission:  08/24/21    Assessment and Plan    Prolonged QT interval  Assessment & Plan  Mild at 486  Will continue to monitor and replete electrolytes as necessary  Will continue to monitor on telemetry  Hypocalcemia  Assessment & Plan  Lab Results   Component Value Date    CALCIUM 8 3 (L) 08/23/2021     Corrected to 8 6 with albumin levels of 3 6  Follow up ionized calcium and replete as necessary  Continue to monitor on tele  Neurogenic bladder  Assessment & Plan  Suspected secondary to diabetes  Has suprapubic catheter placed in June 2021 changed Qmonthly  Last changed 08/03/2021 by [facility] services  Catheter site clean with no dressing  Urinary tract infection associated with cystostomy catheter [facility])  Assessment & Plan  Urinalysis positive for blood, nitrates, white blood cells and bacteria  In ED:  Given ceftriaxone 1 g IV  Will follow-up urine culture  Continue ceftriaxone 1 g IV daily    CKD (chronic kidney disease) stage 4, GFR 15-29 ml/min [facility])  Assessment & Plan  Lab Results   Component Value Date    EGFR 22 (L) 08/23/2021    EGFR 23 (L) 08/03/2021    EGFR 21 (L) 06/04/2021    CREATININE 2 37 (H) 08/23/2021    CREATININE 2 26 (H) 08/03/2021    CREATININE 2 44 (H) 06/04/2021     Baseline creatinine 2 4 - currently at baseline creatinine 2 37  Follows with Urology as outpatient    Will avoid all nephrotoxic medications and procedures as possible  Will continue diuresis given CHF exacerbation with Lasix IV 40 mg daily  Monitor urine output closely        HTN (hypertension)  Assessment & Plan  Blood Pressure: 149/79  currently not at goal (JNC 8:  BP less than 130/80)    Will hold beta-blocker at this time due to CHF exacerbation  Will continue amlodipine 5 mg daily and IV Lasix 40 mg daily  Low-salt diet  Monitor blood pressure t i d     Normochromic normocytic anemia  Assessment & Plan  Normocytic normochromic  Likely secondary to CKD  Will repeat CBC in a m  Low threshold for blood transfusion with hemoglobin less than 8  Consider non benign cause of anemia given drop from 10 in 2 months - fobt and to assess risk for Colon Ca  CAD (coronary artery disease)  Assessment & Plan  Status post PCI x5 with stents placed in 2012 and 2017 [facility])  History of abnormal EKG reported by patient in the past  (no baseline EKG seen on file)  Troponin negative x 2  EKG:  NSR, LBBB    Plan  -serial EKG with troponin (x2)  -GTN 0 4 mg sublingual p r n   -will hold beta-blocker at this time due to acute exacerbation of heart failure  - continue amlodipine 5 mg daily, isosorbide mononitrate 30 mg daily, clopidogrel 75 mg daily, aspirin 81 mg daily  -consult Cardiology      Type 2 diabetes mellitus with diabetic polyneuropathy, with long-term current use of insulin (HCC)  Assessment & Plan  Lab Results   Component Value Date    HGBA1C 7 1 (H) 08/03/2021       No results for input(s): POCGLU in the last 72 hours  Blood Sugar Average: Last 72 hrs:     POA glucose 57  Patient asymptomatic at the time  Resolved to 150s after given meal     Home meds:   Liraglutide weekly (last dose 08/22/2021), Lantus 50 units b i d , Humalog 10 units t i d      While in hospital, given likely reduction in caloric and glucose intake, will adjust medications to following:  - Give Lantus 26 units bid  - Insulin Sliding Scale @ weight based Algorithm 3 with accuchecks ACHS  - Diabetic Diet with card restrcition level 3      * New onset of congestive heart failure (HCC)  Assessment & Plan  Wt Readings from Last 3 Encounters:   08/24/21 (!) 142 kg (312 lb 9 8 oz)   08/03/21 132 kg (290 lb)   08/03/21 132 kg (291 lb)     Likely secondary to UTI vs excessive fluid intake vs discontinuation of diuretic therapy due to worsening kidney function   Dry weight:  128 kg  Last Echo: (January 2021) EF 58%  - no systolic or diastolic dysfunction noted  Home meds:  Carvedilol 25 mg b i d , amlodipine 5 mg daily, isosorbide mononitrate 30 mg daily    TSH (August 2021) normal Troponin:  Negative x 2 WBC:  11  EKG:  NSR, LBBB CXR:  Unremarkable    NYHA Functional Class:  III  ACC/AHA Structural Stage: A    In ED:  IV Lasix 40 mg IV    Plan to:   - Admit to Telemetry  - Supplemental O2 to maintain saturation > 92%  - Continue Lasix 40 mg IV daily  - Fluid restriction @ 1200 cc/day and low salt diet  - Continue other home medications , hold carvedilol  - ASA  - Consult Cardiology + Repeat Echo  - Strict I&O and Daily Weights   - CBC, CMP, Mg, Phos in am          VTE Prophylaxis: Heparin  Code Status: Level 1 - Full Code  Anticipated Length of Stay:  Patient will be admitted on an Inpatient basis with an anticipated length of stay of  less than 2 midnights  Justification for Hospital Stay: CHF exacerbation  Total Time for Visit, including Counseling / Coordination of Care: 45 mins  Greater than 50% of this total time spent on direct patient counseling and coordination of care  Chief Complaint:     Chief Complaint   Patient presents with    Shortness of Breath     pt complains of sob and weakness for few days increasing  EMS found pt with oxygen saturation of 91% bilateral lower ext swelling  History of Present Illness:    Samuel Mcdonald is a 64 y o  female who presents with increasing shortness of breath  Past medical history of type 2 diabetes with insulin, hypertension, dyslipidemia, coronary artery disease (5 stent placements in [place] in 2012 and 2017), CKD stage 4, and suprapubic catheter  Patient states she felt pressure on her chest and increased shortness of breath with exertion    Patient was unable to walk toward car before arriving to ED, described weakness of lower extremities and lightheadedness but never lost consciousness  Pressure was 10/10 upon arrival but currently 0/10 after ED medication administered  Shortness of breath is alleviated with immobility and nitroglycerin 0 4 mg  Patient states she drinks 16 oz of fluid 8-10 times a day and sodium is restricted with help of her daughter  Patient usually uses cane for ambulation at home  Patient denying coughing, wheezing, nausea, vomiting, and belly pain  Patient does note some constipation  Patient was last seen by Cardio on 07/13/2021  Patient at that time was also complaining of increased shortness of breath  Cardiology gave patient aspirin 81, Lipitor 40, carvedilol 25, and Plavix 75  Patient had also been started on isosorbide mononitrate 30 mg daily  Also recommended cardiac catheterization after patient clearance by Nephrology  Patient's last echo, January 2021, showed an ejection fraction of 58 6%  Patient also has suprapubic catheter in place, placed 06/15/2021  Reason for placement was neurogenic bladder secondary to diabetic polyneuropathy  Patient currently denying fever  No blood nor purple discoloration noted in catheter bag  Patient does note some strong scent from bag and some slight discomfort  Patient reports allergy to codeine, reaction being nausea  Denying alcohol and drug use  Quit smoking 2012 after 1st stent placement  ED course- Patient was given Lasix 40 mg IV, ceftriaxone 1000 mg IV  Admitted to -IP for continued management  Review of Systems:  Review of Systems   Constitutional: Negative for chills and fever  HENT: Negative for sore throat  Respiratory: Positive for shortness of breath  Negative for cough and wheezing  Cardiovascular: Positive for chest pain and leg swelling  Negative for palpitations  Gastrointestinal: Positive for constipation  Negative for abdominal pain, diarrhea, nausea and vomiting     Genitourinary: Negative for dysuria and hematuria  Neurological: Negative for syncope  All other systems reviewed and are negative  Past Medical and Surgical History:   Past Medical History:   Diagnosis Date    Abnormal liver function     Anemia     Anxiety     Arthritis     Chronic kidney disease     stage 3    Chronic narcotic dependence (HCC)     Chronic pain disorder     lower back, hands , neck and knees    Coronary artery disease     Depression     Diabetes mellitus ([address] )     GERD (gastroesophageal reflux disease)     no meds at present    Heart murmur     murmur    Hyperlipidemia     Hypertension     Neurogenic bladder     Open toe wound 12/2020    right big toe open calus but is dry at present    Renal disorder     Shortness of breath     exertion    Sleep apnea     doesn't use cpap    Suprapubic catheter [facility])      Past Surgical History:   Procedure Laterality Date    AMPUTATION      ANGIOPLASTY  2017    5    BREAST EXCISIONAL BIOPSY Left     BREAST SURGERY      CARDIAC CATHETERIZATION      CARPAL TUNNEL RELEASE Bilateral     CERVICAL FUSION      HYSTERECTOMY  2008    IR SUPRAPUBIC CATHETER PLACEMENT  6/15/2021    KNEE SURGERY      OOPHORECTOMY  2008    WI EXC SKIN BENIG 3 1-4 CM REMAINDR BODY N/A 12/21/2020    Procedure: EXCISION SEBACEOUS CYST X 5 SCALP;  Surgeon: Ye Michelle MD;  Location:  MAIN OR;  Service: General    TOE AMPUTATION Left     TRIGGER FINGER RELEASE Right     4th Finger     Meds/Allergies: Allergies: Allergies   Allergen Reactions    Codeine      Other reaction(s): Nausea and Vomiting    Latex Itching     Prior to Admission Medications   Prescriptions Last Dose Informant Patient Reported? Taking?    Aspirin Low Dose 81 MG EC tablet 8/22/2021 at Unknown time Self No Yes   Sig: TAKE 1 TABLET (81 MG TOTAL) BY MOUTH ONCE FOR 1 DOSE   Continuous Blood Gluc  (UCampusyle Ira[place] 2 [place]) Mavis Harness 8/22/2021 at Unknown time Self No Yes   Sig: Use as directed Continuous Blood Gluc Sensor (FreeStyle Iraida 14 Day Sensor) MISC 8/22/2021 at Unknown time Self No Yes   Sig: USE 1 SENSOR EVERY 2 WEEKS   Diclofenac Sodium (VOLTAREN) 1 % More than a month at Unknown time Self No No   Sig: Apply 2 g topically 4 (four) times a day   Patient taking differently: Apply 2 g topically as needed    Dulaglutide 1 5 MG/0 5ML SOPN Past Week at Unknown time Self No Yes   Sig: Inject 0 5 mL (1 5 mg total) under the skin once a week   HYDROcodone-acetaminophen (NORCO) 5-325 mg per tablet 8/23/2021 at Unknown time Self No Yes   Sig: Take 1 tablet by mouth 2 (two) times a day as needed for painMax Daily Amount: 2 tablets   Insulin Pen Needle (BD Pen Needle Micro U/F) 32G X 6 MM MISC 8/23/2021 at Unknown time Self No Yes   Sig: Use 3 (three) times a day   Insulin Syringe-Needle U-100 (BD Veo Insulin Syringe U/F) 31G X 15/64" 0 5 ML MISC 8/23/2021 at Unknown time Self No Yes   Sig: Inject under the skin 3 (three) times a day   Lancets (OneTouch Delica Plus [id number]) MISC 8/23/2021 at Unknown time Self No Yes   Sig: USE TO TEST 3 TIMES DAILY   OLANZapine (ZyPREXA) 5 mg tablet 8/22/2021 at Unknown time Self No Yes   Sig: TAKE 1 TABLET BY MOUTH AT BEDTIME   SPS 15 GM/60ML suspension Unknown at Unknown time Self Yes No   Patient not taking: Reported on 8/3/2021   allopurinol (ZYLOPRIM) 300 mg tablet 8/22/2021 at Unknown time Self No Yes   Sig: Take 1 tablet (300 mg total) by mouth daily   amLODIPine (NORVASC) 5 mg tablet 8/22/2021 at Unknown time  No Yes   Sig: Take 1 tablet (5 mg total) by mouth daily   atorvastatin (LIPITOR) 40 mg tablet 8/22/2021 at Unknown time Self No Yes   Sig: Take 1 tablet (40 mg total) by mouth daily   carvedilol (COREG) 25 mg tablet 8/23/2021 at Unknown time  No Yes   Sig: TAKE 1 TABLET BY MOUTH TWICE A DAY WITH FOOD   citalopram (CeleXA) 40 mg tablet 8/22/2021 at Unknown time Self No Yes   Sig: Take 1 tablet (40 mg total) by mouth daily   clopidogrel (PLAVIX) 75 mg tablet 8/22/2021 at Unknown time Self No Yes   Sig: TAKE 1 TABLET BY MOUTH EVERY DAY   diphenhydrAMINE (BENADRYL) 25 mg capsule 8/22/2021 at Unknown time Self Yes Yes   Sig: Take 25 mg by mouth every 4 (four) hours as needed for allergies or sleep    docusate sodium (COLACE) 100 mg capsule Past Week at Unknown time Self No Yes   Sig: Take 1 capsule (100 mg total) by mouth 2 (two) times a day   gabapentin (NEURONTIN) 600 MG tablet 8/23/2021 at Unknown time Self No Yes   Sig: TAKE 1 TABLET (600 MG TOTAL) BY MOUTH 4 (FOUR) TIMES A DAY   glucose blood (OneTouch Ultra) test strip 8/22/2021 at Unknown time Self No Yes   Sig: Use 1 each daily Use as instructed   insulin glargine (Lantus SoloStar) 100 units/mL injection pen 8/22/2021 at Unknown time  No Yes   Sig: Inject 50 Units under the skin every 12 (twelve) hours   insulin lispro (HumaLOG KwikPen) 100 units/mL injection pen 8/23/2021 at Unknown time Self No Yes   Sig: Inject 10 Units under the skin 3 (three) times a day with meals If blood glucose >150   isosorbide mononitrate (IMDUR) 30 mg 24 hr tablet 8/23/2021 at Unknown time Self No Yes   Sig: Take 1 tablet (30 mg total) by mouth daily   lactulose (CHRONULAC) 10 g/15 mL solution Not Taking at Unknown time Self Yes No   Sig: Take 20 g by mouth daily at bedtime For elev Ammonia level   Patient not taking: Reported on 6/23/2021   levothyroxine 50 mcg tablet 8/23/2021 at Unknown time  No Yes   Sig: TAKE 1 TABLET BY MOUTH EVERY DAY   lidocaine (LIDODERM) 5 % Not Taking at Unknown time Self No No   Sig: Apply 1 patch topically daily Remove & Discard patch within 12 hours or as directed by MD   Patient not taking: Reported on 7/13/2021   mupirocin (BACTROBAN) 2 % ointment Not Taking at Unknown time Self No No   Sig: Apply topically daily   Patient not taking: Reported on 8/3/2021   naloxone (NARCAN) 4 mg/0 1 mL nasal spray Not Taking at Unknown time Self No No   Sig: Administer 1 spray into a nostril   If breathing does not return to normal or if breathing difficulty resumes after 2-3 minutes, give another dose in the other nostril using a new spray  Patient not taking: Reported on 2021   nitroglycerin (NITROSTAT) 0 4 mg SL tablet 2021 at Unknown time Self No Yes   Sig: Place 1 tablet (0 4 mg total) under the tongue every 5 (five) minutes as needed for chest pain   oxybutynin (DITROPAN-XL) 10 MG 24 hr tablet 2021 at Unknown time Self No Yes   Sig: Take 1 tablet (10 mg total) by mouth daily at bedtime   silver sulfadiazine (SILVADENE,SSD) 1 % cream Not Taking at Unknown time Self No No   Sig: Apply topically daily To burns on fingers, cover w/band-aid  Patient not taking: Reported on 8/3/2021      Facility-Administered Medications: None     Social History:     Social History     Socioeconomic History    Marital status:      Spouse name: Not on file    Number of children: Not on file    Years of education: Not on file    Highest education level: Not on file   Occupational History    Not on file   Tobacco Use    Smoking status: Former Smoker     Packs/day: 1 00     Years: 35 00     Pack years: 35 00     Types: Cigarettes     Quit date:      Years since quittin 6    Smokeless tobacco: Never Used   Vaping Use    Vaping Use: Never used   Substance and Sexual Activity    Alcohol use: Not Currently    Drug use: Never    Sexual activity: Not Currently   Other Topics Concern    Not on file   Social History Narrative    Not on file     Social Determinants of Health     Financial Resource Strain:     Difficulty of Paying Living Expenses:    Food Insecurity:     Worried About [address] in the Last Year:     [address] [place] [employer] in the Last Year:    Transportation Needs:     Lack of Transportation (Medical):      Lack of Transportation (Non-Medical):    Physical Activity:     Days of Exercise per Week:     Minutes of Exercise per Session:    Stress:     Feeling of Stress :    Social Connections:     Frequency of Communication with Friends and Family:     Frequency of Social Gatherings with Friends and Family:     Attends Oriental orthodox Services:     Active Member of Clubs or Organizations:     Attends Club or Organization Meetings:     Marital Status:    Intimate Partner Violence:     Fear of Current or Ex-Partner:     Emotionally Abused:     Physically Abused:     Sexually Abused:      Patient Pre-hospital Living Situation: Lives with family  Patient Pre-hospital Level of Mobility: Ambulates with cane  Patient Pre-hospital Diet Restrictions:  Sodium restriction, diabetic nutrition    Family History:  Family History   Problem Relation Age of Onset    Stroke Father     Heart disease Father     No Known Problems Mother     No Known Problems Sister     No Known Problems Daughter     No Known Problems Maternal Grandmother     No Known Problems Maternal Grandfather     No Known Problems Paternal Grandmother     No Known Problems Paternal Grandfather     No Known Problems Maternal Aunt     No Known Problems Maternal Aunt     No Known Problems Maternal Aunt     No Known Problems Maternal Aunt     No Known Problems Maternal Aunt     No Known Problems Maternal Aunt     No Known Problems Paternal Aunt        Physical Exam:   Vitals:   Blood Pressure: 149/79 (08/24/21 0002)  Pulse: 77 (08/24/21 0002)  Temperature: (!) 97 °F (36 1 °C) (08/24/21 0002)  Temp Source: Temporal (08/24/21 0002)  Respirations: 18 (08/24/21 0002)  Height: 5' 8" (172 7 cm) (08/24/21 0002)  Weight - Scale: (!) 142 kg (313 lb 0 9 oz) (08/24/21 0536)  SpO2: 96 % (08/24/21 0002)    Physical Exam  Vitals and nursing note reviewed  Constitutional:       General: She is not in acute distress  Appearance: Normal appearance  She is obese  She is not ill-appearing, toxic-appearing or diaphoretic  HENT:      Head: Normocephalic  Nose: Nose normal  No congestion or rhinorrhea        Mouth/Throat:      Mouth: Mucous membranes are moist       Pharynx: Oropharynx is clear  No oropharyngeal exudate or posterior oropharyngeal erythema  Eyes:      General: No scleral icterus  Right eye: No discharge  Left eye: No discharge  Conjunctiva/sclera: Conjunctivae normal    Cardiovascular:      Rate and Rhythm: Normal rate and regular rhythm  Pulses: Normal pulses  Heart sounds: Normal heart sounds  No murmur heard  No friction rub  No gallop  Pulmonary:      Effort: Pulmonary effort is normal  No respiratory distress  Breath sounds: Normal breath sounds  No stridor  No wheezing, rhonchi or rales  Chest:      Chest wall: No tenderness  Abdominal:      General: Bowel sounds are normal  There is no distension  Palpations: Abdomen is soft  There is no mass  Tenderness: There is no abdominal tenderness  There is no guarding or rebound  Hernia: A hernia (Umbilical hernia noted) is present  Musculoskeletal:         General: No tenderness, deformity or signs of injury  Right lower leg: No tenderness  3+ Pitting Edema present  Left lower leg: No tenderness  3+ Pitting Edema present  Legs:       Comments: Bilateral pitting edema to the level of the knee  Erythematous patch of skin noted on right shin  Feet:      Right foot:      Skin integrity: No ulcer  Left foot:      Skin integrity: No ulcer  Skin:     General: Skin is warm  Coloration: Skin is not jaundiced or pale  Findings: No bruising or lesion  Neurological:      General: No focal deficit present  Mental Status: She is alert  Psychiatric:         Mood and Affect: Mood normal          Behavior: Behavior normal          Lab Results: I have personally reviewed pertinent reports      Results from last 7 days   Lab Units 08/24/21  0430   WBC Thousand/uL 9 60   HEMOGLOBIN g/dL 8 5*   HEMATOCRIT % 25 1*   PLATELETS Thousands/uL 220   NEUTROS PCT % 71*   LYMPHS PCT % 18*   MONOS PCT % 7   EOS PCT % 3     Results from last 7 days   Lab Units 08/24/21  0430 08/23/21 2046   POTASSIUM mmol/L 4 5 4 9   CHLORIDE mmol/L 109* 111*   CO2 mmol/L 22 22   BUN mg/dL 57* 56*   CREATININE mg/dL 2 35* 2 37*   CALCIUM mg/dL 8 3* 8 3*   ALK PHOS U/L 100 98   ALT U/L 16 16   AST U/L 35 24   EGFR ml/min/1 73sq m 22* 22*   MAGNESIUM mg/dL 2 2 2 0   PHOSPHORUS mg/dL 5 1*  --      Results from last 7 days   Lab Units 08/23/21 2046   INR  1 03     Results from last 7 days   Lab Units 08/24/21  0430 08/24/21  0042 08/23/21 2046   TROPONIN I ng/mL <0 01 <0 01 <0 01             Results from last 7 days   Lab Units 08/23/21 2046   NT-PRO BNP pg/mL 985*      Results from last 7 days   Lab Units 08/23/21 2051   COLOR UA  Yellow   CLARITY UA  Cloudy*   SPEC GRAV UA  1 020   PH UA  6 0   LEUKOCYTES UA  500 0*   NITRITE UA  Positive*   GLUCOSE UA mg/dl Negative   KETONES UA mg/dl Negative   BILIRUBIN UA  Negative   BLOOD UA  250 0*      Results from last 7 days   Lab Units 08/23/21 2051   RBC UA /hpf 20-30*   WBC UA /hpf 30-50*   EPITHELIAL CELLS WET PREP /hpf Occasional   BACTERIA UA /hpf Innumerable*        Imaging: I have personally reviewed pertinent reports  No results found      EKG, Pathology, and Other Studies Reviewed on Admission:   EKG  Result Date: 08/24/21  Impression:  Normal sinus rhythm, left bundle-branch block (likely old feature)    Entire H&P was discussed with Dr Austyn Dunn who agreed to what is noted above    Roslyn Meade MD  08/24/21  7:19 AM

## 2021-08-24 NOTE — ED PROVIDER NOTES
History  Chief Complaint   Patient presents with    Shortness of Breath     pt complains of sob and weakness for few days increasing  EMS found pt with oxygen saturation of 91% bilateral lower ext swelling  Patient presents for an evaluation of shortness of breath, chest pain for the past several days  States chest pain has now lessened to "a dull ache" at this time  Shortness of breath is worse with exertion  She reports weight gain and lower extremity swelling worsening over the past few days as well  Denies any fevers, chills, abdominal pain, nausea, vomiting, headache, dizziness, syncope, palpitations  No other complaints  Prior to Admission Medications   Prescriptions Last Dose Informant Patient Reported? Taking?    Aspirin Low Dose 81 MG EC tablet  Self No No   Sig: TAKE 1 TABLET (81 MG TOTAL) BY MOUTH ONCE FOR 1 DOSE   Continuous Blood Gluc  (Automation [place]) YOUNG  Self No No   Sig: Use as directed   Continuous Blood Gluc Sensor (FreeStyle [place] 14 Day Sensor) MISC  Self No No   Sig: USE 1 SENSOR EVERY 2 WEEKS   Diclofenac Sodium (VOLTAREN) 1 %  Self No No   Sig: Apply 2 g topically 4 (four) times a day   Patient taking differently: Apply 2 g topically as needed    Dulaglutide 1 5 MG/0 5ML SOPN  Self No No   Sig: Inject 0 5 mL (1 5 mg total) under the skin once a week   HYDROcodone-acetaminophen (NORCO) 5-325 mg per tablet  Self No No   Sig: Take 1 tablet by mouth 2 (two) times a day as needed for painMax Daily Amount: 2 tablets   Insulin Pen Needle (BD Pen Needle Micro U/F) 32G X 6 MM MISC  Self No No   Sig: Use 3 (three) times a day   Insulin Syringe-Needle U-100 (BD Veo Insulin Syringe U/F) 31G X 15/64" 0 5 ML MISC  Self No No   Sig: Inject under the skin 3 (three) times a day   Lancets (OneTouch Delica Plus [id number]) MISC  Self No No   Sig: USE TO TEST 3 TIMES DAILY   OLANZapine (ZyPREXA) 5 mg tablet  Self No No   Sig: TAKE 1 TABLET BY MOUTH AT BEDTIME   SPS 15 GM/60ML suspension  Self Yes No   Patient not taking: Reported on 8/3/2021   allopurinol (ZYLOPRIM) 300 mg tablet  Self No No   Sig: Take 1 tablet (300 mg total) by mouth daily   amLODIPine (NORVASC) 5 mg tablet   No No   Sig: Take 1 tablet (5 mg total) by mouth daily   atorvastatin (LIPITOR) 40 mg tablet  Self No No   Sig: Take 1 tablet (40 mg total) by mouth daily   carvedilol (COREG) 25 mg tablet   No No   Sig: TAKE 1 TABLET BY MOUTH TWICE A DAY WITH FOOD   citalopram (CeleXA) 40 mg tablet  Self No No   Sig: Take 1 tablet (40 mg total) by mouth daily   clopidogrel (PLAVIX) 75 mg tablet  Self No No   Sig: TAKE 1 TABLET BY MOUTH EVERY DAY   diphenhydrAMINE (BENADRYL) 25 mg capsule  Self Yes No   Sig: Take 25 mg by mouth every 4 (four) hours as needed for allergies or sleep    docusate sodium (COLACE) 100 mg capsule  Self No No   Sig: Take 1 capsule (100 mg total) by mouth 2 (two) times a day   gabapentin (NEURONTIN) 600 MG tablet  Self No No   Sig: TAKE 1 TABLET (600 MG TOTAL) BY MOUTH 4 (FOUR) TIMES A DAY   glucose blood (OneTouch Ultra) test strip  Self No No   Sig: Use 1 each daily Use as instructed   insulin glargine (Lantus SoloStar) 100 units/mL injection pen   No No   Sig: Inject 50 Units under the skin every 12 (twelve) hours   insulin lispro (HumaLOG KwikPen) 100 units/mL injection pen  Self No No   Sig: Inject 10 Units under the skin 3 (three) times a day with meals If blood glucose >150   isosorbide mononitrate (IMDUR) 30 mg 24 hr tablet  Self No No   Sig: Take 1 tablet (30 mg total) by mouth daily   lactulose (CHRONULAC) 10 g/15 mL solution  Self Yes No   Sig: Take 20 g by mouth daily at bedtime For elev Ammonia level   Patient not taking: Reported on 6/23/2021   levothyroxine 50 mcg tablet   No No   Sig: TAKE 1 TABLET BY MOUTH EVERY DAY   lidocaine (LIDODERM) 5 %  Self No No   Sig: Apply 1 patch topically daily Remove & Discard patch within 12 hours or as directed by MD   Patient not taking: Reported on 7/13/2021   mupirocin (BACTROBAN) 2 % ointment  Self No No   Sig: Apply topically daily   Patient not taking: Reported on 8/3/2021   naloxone (NARCAN) 4 mg/0 1 mL nasal spray  Self No No   Sig: Administer 1 spray into a nostril  If breathing does not return to normal or if breathing difficulty resumes after 2-3 minutes, give another dose in the other nostril using a new spray  Patient not taking: Reported on 7/13/2021   nitroglycerin (NITROSTAT) 0 4 mg SL tablet  Self No No   Sig: Place 1 tablet (0 4 mg total) under the tongue every 5 (five) minutes as needed for chest pain   Patient not taking: Reported on 8/3/2021   oxybutynin (DITROPAN-XL) 10 MG 24 hr tablet  Self No No   Sig: Take 1 tablet (10 mg total) by mouth daily at bedtime   silver sulfadiazine (SILVADENE,SSD) 1 % cream  Self No No   Sig: Apply topically daily To burns on fingers, cover w/band-aid     Patient not taking: Reported on 8/3/2021      Facility-Administered Medications: None       Past Medical History:   Diagnosis Date    Abnormal liver function     Anemia     Anxiety     Arthritis     Chronic kidney disease     stage 3    Chronic narcotic dependence (HCC)     Chronic pain disorder     lower back, hands , neck and knees    Coronary artery disease     Depression     Diabetes mellitus ([facility] [address] )     GERD (gastroesophageal reflux disease)     no meds at present    Heart murmur     murmur    Hyperlipidemia     Hypertension     Neurogenic bladder     Open toe wound 12/2020    right big toe open calus but is dry at present    Renal disorder     Shortness of breath     exertion    Sleep apnea     doesn't use cpap    Suprapubic catheter [facility])        Past Surgical History:   Procedure Laterality Date    AMPUTATION      ANGIOPLASTY  2017    5    BREAST EXCISIONAL BIOPSY Left     BREAST SURGERY      CARDIAC CATHETERIZATION      CARPAL TUNNEL RELEASE Bilateral     CERVICAL FUSION      HYSTERECTOMY  2008    IR SUPRAPUBIC CATHETER PLACEMENT  6/15/2021    KNEE SURGERY      OOPHORECTOMY  2008    [place] EXC SKIN BENIG 3 1-4 CM REMAINDR BODY N/A 2020    Procedure: EXCISION SEBACEOUS CYST X 5 SCALP;  Surgeon: Rommel Dela Cruz MD;  Location:  MAIN OR;  Service: General    TOE AMPUTATION Left     TRIGGER FINGER RELEASE Right     4th Finger       Family History   Problem Relation Age of Onset    Stroke Father     Heart disease Father     No Known Problems Mother     No Known Problems Sister     No Known Problems Daughter     No Known Problems Maternal Grandmother     No Known Problems Maternal Grandfather     No Known Problems Paternal Grandmother     No Known Problems Paternal Grandfather     No Known Problems Maternal Aunt     No Known Problems Maternal Aunt     No Known Problems Maternal Aunt     No Known Problems Maternal Aunt     No Known Problems Maternal Aunt     No Known Problems Maternal Aunt     No Known Problems Paternal Aunt      I have reviewed and agree with the history as documented  E-Cigarette/Vaping    E-Cigarette Use Never User      E-Cigarette/Vaping Substances    Nicotine No     THC No     CBD No     Flavoring No     Other No     Unknown No      Social History     Tobacco Use    Smoking status: Former Smoker     Packs/day: 1 00     Years: 35 00     Pack years: 35 00     Types: Cigarettes     Quit date:      Years since quittin 6    Smokeless tobacco: Never Used   Vaping Use    Vaping Use: Never used   Substance Use Topics    Alcohol use: Not Currently    Drug use: Never       Review of Systems   Constitutional: Negative for chills and fever  HENT: Negative for congestion, ear pain and sore throat  Eyes: Negative for pain  Respiratory: Positive for shortness of breath  Negative for cough  Cardiovascular: Positive for chest pain and leg swelling  Negative for palpitations  Gastrointestinal: Negative for abdominal pain, nausea and vomiting     Genitourinary: Negative for dysuria  Musculoskeletal: Negative for back pain  Skin: Negative for rash  Neurological: Negative for dizziness and numbness  Psychiatric/Behavioral: Negative for suicidal ideas  All other systems reviewed and are negative  Physical Exam  Physical Exam  Vitals reviewed  Constitutional:       General: She is not in acute distress  Appearance: She is well-developed  She is obese  HENT:      Head: Normocephalic and atraumatic  Right Ear: External ear normal       Left Ear: External ear normal       Nose: Nose normal       Mouth/Throat:      Mouth: Mucous membranes are moist       Pharynx: Oropharynx is clear  Eyes:      Extraocular Movements: Extraocular movements intact  Pupils: Pupils are equal, round, and reactive to light  Cardiovascular:      Rate and Rhythm: Normal rate and regular rhythm  Heart sounds: Normal heart sounds  Pulmonary:      Effort: Pulmonary effort is normal       Breath sounds: Rales present  No decreased breath sounds  Comments: Crackles noted in b/l lung bases  Abdominal:      General: Bowel sounds are normal       Palpations: Abdomen is soft  Tenderness: There is no abdominal tenderness  Musculoskeletal:         General: Normal range of motion  Cervical back: Normal range of motion and neck supple  Right lower leg: Edema present  Left lower leg: Edema present  Right ankle: Swelling present  Left ankle: Swelling present  Right foot: Swelling present  Left foot: Swelling present  Comments: 3+ pitting edema noted to b/l feet, lower extremities   Skin:     General: Skin is warm and dry  Capillary Refill: Capillary refill takes less than 2 seconds  Neurological:      Mental Status: She is alert and oriented to person, place, and time     Psychiatric:         Behavior: Behavior normal          Vital Signs  ED Triage Vitals   Temperature Pulse Respirations Blood Pressure SpO2   08/23/21 2034 08/23/21 2024 08/23/21 2024 08/23/21 2024 08/23/21 2024   (!) 97 °F (36 1 °C) 73 (!) 24 135/53 94 %      Temp Source Heart Rate Source Patient Position - Orthostatic VS BP Location FiO2 (%)   08/23/21 2034 08/23/21 2024 08/23/21 2024 08/23/21 2024 --   Tympanic Monitor Sitting Left arm       Pain Score       08/23/21 2024       7           Vitals:    08/23/21 2024 08/23/21 2057 08/23/21 2102 08/23/21 2134   BP: 135/53 135/61 144/57 124/55   Pulse: 73 69 70 71   Patient Position - Orthostatic VS: Sitting Lying Sitting Sitting         Visual Acuity      ED Medications  Medications   cefTRIAXone (ROCEPHIN) IVPB (premix in dextrose) 1,000 mg 50 mL (has no administration in time range)   furosemide (LASIX) injection 40 mg (40 mg Intravenous Given 8/23/21 2055)       Diagnostic Studies  Results Reviewed     Procedure Component Value Units Date/Time    COVID19, Influenza A/B, RSV PCR, [facility] [[id number]]  (Normal) Collected: 08/23/21 2046    Lab Status: Final result Specimen: Nares from Nose Updated: 08/23/21 2135     SARS-CoV-2 Negative     INFLUENZA A PCR Negative     INFLUENZA B PCR Negative     RSV PCR Negative    Narrative: This test has been authorized by FDA under an EUA (Emergency Use Assay) for use by authorized laboratories  Clinical caution and judgement should be used with the interpretation of these results with consideration of the clinical impression and other laboratory testing  Testing reported as "Positive" or "Negative" has been proven to be accurate according to standard laboratory validation requirements  All testing is performed with control materials showing appropriate reactivity at standard intervals      NT-BNP PRO [[id number]]  (Abnormal) Collected: 08/23/21 2046    Lab Status: Final result Specimen: Blood from Arm, Left Updated: 08/23/21 2119     NT-proBNP 985 pg/mL     Troponin I [[id number]]  (Normal) Collected: 08/23/21 2046    Lab Status: Final result Specimen: Blood from Arm, Left Updated: 08/23/21 2119     Troponin I <0 01 ng/mL     Urine Microscopic [[id number]]  (Abnormal) Collected: 08/23/21 2051    Lab Status: Final result Specimen: Urine, Suprapubic catheter Updated: 08/23/21 2114     RBC, UA 20-30 /hpf      WBC, UA 30-50 /hpf      Epithelial Cells Occasional /hpf      Bacteria, UA Innumerable /hpf      COARSE GRANULAR CASTS 0-1 /lpf      MUCUS THREADS Occasional    Urine culture [[id number]] Collected: 08/23/21 2051    Lab Status:  In process Specimen: Urine, Suprapubic catheter Updated: 08/23/21 2114    Comprehensive metabolic panel [[id number]]  (Abnormal) Collected: 08/23/21 2046    Lab Status: Final result Specimen: Blood from Arm, Left Updated: 08/23/21 2108     Sodium 141 mmol/L      Potassium 4 9 mmol/L      Chloride 111 mmol/L      CO2 22 mmol/L      ANION GAP 8 mmol/L      BUN 56 mg/dL      Creatinine 2 37 mg/dL      Glucose 52 mg/dL      Calcium 8 3 mg/dL      AST 24 U/L      ALT 16 U/L      Alkaline Phosphatase 98 U/L      Total Protein 6 4 g/dL      Albumin 3 6 g/dL      Total Bilirubin 0 33 mg/dL      eGFR 22 ml/min/1 73sq m     Narrative:      [place] guidelines for Chronic Kidney Disease (CKD):     Stage 1 with normal or high GFR (GFR > 90 mL/min/1 73 square meters)    Stage 2 Mild CKD (GFR = 60-89 mL/min/1 73 square meters)    Stage 3A Moderate CKD (GFR = 45-59 mL/min/1 73 square meters)    Stage 3B Moderate CKD (GFR = 30-44 mL/min/1 73 square meters)    Stage 4 Severe CKD (GFR = 15-29 mL/min/1 73 square meters)    Stage 5 End Stage CKD (GFR <15 mL/min/1 73 square meters)  Note: GFR calculation is accurate only with a steady state creatinine    Magnesium [[id number]]  (Normal) Collected: 08/23/21 2046    Lab Status: Final result Specimen: Blood from Arm, Left Updated: 08/23/21 2108     Magnesium 2 0 mg/dL     Protime-INR [[id number]]  (Normal) Collected: 08/23/21 2046    Lab Status: Final result Specimen: Blood from Arm, Left Updated: 08/23/21 2107 Protime 13 6 seconds      INR 1 03    APTT [[id number]]  (Normal) Collected: 08/23/21 2046    Lab Status: Final result Specimen: Blood from Arm, Left Updated: 08/23/21 2107     PTT 33 seconds     CBC and differential [[id number]]  (Abnormal) Collected: 08/23/21 2046    Lab Status: Final result Specimen: Blood from Arm, Left Updated: 08/23/21 2100     WBC 11 00 Thousand/uL      RBC 2 66 Million/uL      Hemoglobin 8 6 g/dL      Hematocrit 25 7 %      MCV 97 fL      MCH 32 2 pg      MCHC 33 3 g/dL      RDW 17 0 %      MPV 7 2 fL      Platelets 397 Thousands/uL      Neutrophils Relative 76 %      Lymphocytes Relative 12 %      Monocytes Relative 8 %      Eosinophils Relative 3 %      Basophils Relative 1 %      Neutrophils Absolute 8 40 Thousands/µL      Lymphocytes Absolute 1 40 Thousands/µL      Monocytes Absolute 0 90 Thousand/µL      Eosinophils Absolute 0 30 Thousand/µL      Basophils Absolute 0 10 Thousands/µL     UA w Reflex to Microscopic w Reflex to Culture [[id number]]  (Abnormal) Collected: 08/23/21 2051    Lab Status: Final result Specimen: Urine, Suprapubic catheter Updated: 08/23/21 2059     Color, UA Yellow     Clarity, UA Cloudy     Specific [place], UA 1 020     pH, UA 6 0     Leukocytes,  0     Nitrite, UA Positive     Protein, UA >=500 mg/dl      Glucose, UA Negative mg/dl      Ketones, UA Negative mg/dl      Bilirubin, UA Negative     Blood,  0     UROBILINOGEN UA Negative mg/dL                  XR chest portable    (Results Pending)              Procedures  Procedures         ED Course  ED Course as of Aug 23 2222   Mon Aug 23, 2021   2037 Procedure Note: EKG  Date/Time: 08/23/21 8:37 PM   Performed by: Anahi Coy  Authorized by: Anahi Coy  ECG interpreted by me, the ED Provider: yes   The EKG demonstrates:  Rate 71  Rhythm normal sinus with LBBB noted  QTc 486  No ST elevations/depressions          2147 TT FM                                SBIRT 22yo+      Most Recent Value   SBIRT (21 yo +)   In order to provide better care to our patients, we are screening all of our patients for alcohol and drug use  Would it be okay to ask you these screening questions? Yes Filed at: 08/23/2021 2100   Initial Alcohol Screen: US AUDIT-C    1  How often do you have a drink containing alcohol?  0 Filed at: 08/23/2021 2100   2  How many drinks containing alcohol do you have on a typical day you are drinking? 0 Filed at: 08/23/2021 2100   3b  FEMALE Any Age, or MALE 65+: How often do you have 4 or more drinks on one occassion? 0 Filed at: 08/23/2021 2100   Audit-C Score  0 Filed at: 08/23/2021 2100   FAISAL: How many times in the past year have you    Used an illegal drug or used a prescription medication for non-medical reasons? Never Filed at: 08/23/2021 2100                    MDM  Number of Diagnoses or Management Options      Disposition  Final diagnoses:   CHF (congestive heart failure) ([facility])   Dyspnea on exertion   UTI (urinary tract infection)   Chronic kidney disease     Time reflects when diagnosis was documented in both MDM as applicable and the Disposition within this note     Time User Action Codes Description Comment    8/23/2021  9:55 PM Zahra Gutierres Add [I50 9] CHF (congestive heart failure) ([facility] [address] )     8/23/2021  9:55 PM Zahar Gutierres Add [R06 00] Dyspnea on exertion     8/23/2021  9:56 PM Zahra Gutierres Add [N39 0] UTI (urinary tract infection)     8/23/2021  9:56 PM Zahra Gutierres Add [N18 9] Chronic kidney disease       ED Disposition     ED Disposition Condition Date/Time Comment    Admit Stable Mon Aug 23, 2021  9:56 PM Case was discussed with family medicine and the patient's admission status was agreed to be Admission Status: inpatient status to the service of Dr Erika Sales   Follow-up Information    None         Patient's Medications   Discharge Prescriptions    No medications on file     No discharge procedures on file      PDMP Review       Value Time User    PDMP Reviewed  Yes 6/13/2021 7:28 PM Diane Burr DO          ED Provider  Electronically Signed by           Jeffrey Hess PA-C  08/23/21 4906

## 2021-08-24 NOTE — OCCUPATIONAL THERAPY NOTE
Occupational Therapy Evaluation     Patient Name: Kurt ACEVEDO Date: 8/24/2021  Problem List  Principal Problem:    New onset of congestive heart failure ([facility] [address] )  Active Problems:    Type 2 diabetes mellitus with diabetic polyneuropathy, with long-term current use of insulin (HCC)    CAD (coronary artery disease)    Normochromic normocytic anemia    HTN (hypertension)    CKD (chronic kidney disease) stage 4, GFR 15-29 ml/min ([facility])    Urinary tract infection associated with cystostomy catheter ([facility])    Neurogenic bladder    Hypocalcemia    Prolonged QT interval    Past Medical History  Past Medical History:   Diagnosis Date    Abnormal liver function     Anemia     Anxiety     Arthritis     Chronic kidney disease     stage 3    Chronic narcotic dependence ([facility])     Chronic pain disorder     lower back, hands , neck and knees    Coronary artery disease     Depression     Diabetes mellitus ([facility] [address] )     GERD (gastroesophageal reflux disease)     no meds at present    Heart murmur     murmur    Hyperlipidemia     Hypertension     Neurogenic bladder     Open toe wound 12/2020    right big toe open calus but is dry at present    Renal disorder     Shortness of breath     exertion    Sleep apnea     doesn't use cpap    Suprapubic catheter [facility])      Past Surgical History  Past Surgical History:   Procedure Laterality Date    AMPUTATION      ANGIOPLASTY  2017    5    BREAST EXCISIONAL BIOPSY Left     BREAST SURGERY      CARDIAC CATHETERIZATION      CARPAL TUNNEL RELEASE Bilateral     CERVICAL FUSION      HYSTERECTOMY  2008    IR SUPRAPUBIC CATHETER PLACEMENT  6/15/2021    KNEE SURGERY      OOPHORECTOMY  2008    [place] EXC SKIN BENIG 3 1-4 CM REMAINDR BODY N/A 12/21/2020    Procedure: EXCISION SEBACEOUS CYST X 5 SCALP;  Surgeon: Alison Conrad MD;  Location:  MAIN OR;  Service: General    TOE AMPUTATION Left     TRIGGER FINGER RELEASE Right     4th Finger             08/24/21 1105   OT Last Visit   OT Visit Date 08/24/21   Note Type   Note type Evaluation   Restrictions/Precautions   Weight Bearing Precautions Per Order No   Other Precautions   (catheter )   Pain Assessment   Pain Assessment Tool Pain Assessment not indicated - pt denies pain   Home Living   Type of Home Apartment   Home Layout One level   Bathroom Shower/Tub Walk-in shower   Bathroom Toilet Standard   Bathroom Accessibility Accessible via walker   Home Equipment Walker;Cane   Prior Function   Level of [place] Needs assistance with IADLs; Needs assistance with ADLs and functional mobility   Lives With Daughter   Receives Help From Family   ADL Assistance Needs assistance   IADLs Needs assistance   Falls in the last 6 months 0   Comments Pt reports that her daughter is her official caregiver and assist with all IADLs and ADLs as needed  Psychosocial   Psychosocial (WDL) WDL   Subjective   Subjective "I would feel better with the walker now"   ADL   Eating Assistance 7  Independent   Grooming Assistance 5  Supervision/Setup   Grooming Deficit Wash/dry hands; Wash/dry face   UB Bathing Assistance 5  Supervision/Setup   LB Bathing Assistance 4  Minimal Assistance   UB Dressing Assistance 5  Supervision/Setup   LB [place]  4  Minimal Assistance   Toileting Deficit Clothing management up;Clothing management down   Bed Mobility   Supine to Sit 5  Supervision   Transfers   Sit to Stand   (CGA)   Stand to Sit   (CGA)   Toilet transfer   (CGA)   Additional items Standard toilet   Functional Mobility   Functional Mobility   (CGA)   Additional Comments short household distances    Additional items Rolling walker   Balance   Static Sitting Good   Dynamic Sitting Fair +   Static Standing Fair   Dynamic Standing Fair -   Ambulatory Fair -   Activity Tolerance   Activity Tolerance Patient tolerated treatment well   RUE Assessment   RUE Assessment WFL   LUE Assessment   LUE Assessment [facility]   Perception   Inattention/Neglect Appears intact   Cognition   Overall Cognitive Status WFL   Arousal/Participation Alert; Cooperative   Attention Within functional limits   Orientation Level Oriented X4   Memory Within functional limits   Following Commands Follows all commands and directions without difficulty   Assessment   Limitation Decreased high-level ADLs; Decreased UE strength;Decreased ADL status; Decreased endurance   Prognosis Good   Assessment   Pt is a 64 y o  female seen for OT evaluation s/p admit to [facility] on 8/23/2021 w/ New onset of congestive heart failure ([facility] [address] )  See above for extensive list of comorbidities affecting Pt's functional performance at time of assessment  Personal factors affecting Pt at time of IE include:difficulty performing ADLS, difficulty performing IADLS  and health management   Prior to admission, Pt was living at home with family, reports her daughter assists with all IADLs, ADLs as needed including toileting, bathing, and dressing  Pt reports typically using a cane, however does have a walker as well  Upon evaluation: Pt requires CGA for transfers to standing and toilet transfer, Jose Carlos for hygiene following bowel movement and clothing management  Pt's primary limitation at this time is decreased endurance and activity tolerance  The following deficits impact occupational performance: weakness, decreased strength, decreased balance and decreased tolerance  Pt to benefit from continued skilled OT services while in the hospital to address deficits as defined above and maximize level of functional independence w ADL's and functional mobility  Occupational performance areas to address include: bathing/shower, toilet hygiene, dressing, health maintenance, functional mobility and clothing management  From OT standpoint, recommendation at time of d/c would be home with resumed assist from family  Goals   STG Time Frame   (1 week)   Short Term Goals 1   Pt will complete functional ambulation Mariusz  2  Pt will complete toilet transfer Mariusz  3  Pt will complete ~ 10 minutes of OOB activity with good activity tolerance  Plan   Treatment Interventions ADL retraining;Functional transfer training;UE strengthening/ROM; Endurance training;Continued evaluation; Energy conservation; Activityengagement   Goal Expiration Date 08/31/21   OT Frequency 2-3x/wk   Recommendation   OT Discharge Recommendation No rehabilitation needs

## 2021-08-24 NOTE — PLAN OF CARE
Problem: Potential for Falls  Goal: Patient will remain free of falls  Description: INTERVENTIONS:  - Educate patient/family on patient safety including physical limitations  - Instruct patient to call for assistance with activity   - Consult OT/PT to assist with strengthening/mobility   - Keep Call bell within reach  - Keep bed low and locked with side rails adjusted as appropriate  - Keep care items and personal belongings within reach  - Initiate and maintain comfort rounds  - Make Fall Risk Sign visible to staff  - Apply yellow socks and bracelet for high fall risk patients  - Consider moving patient to room near nurses station  Outcome: Progressing     Problem: MOBILITY - ADULT  Goal: Maintain or return to baseline ADL function  Description: INTERVENTIONS:  -  Assess patient's ability to carry out ADLs; assess patient's baseline for ADL function and identify physical deficits which impact ability to perform ADLs (bathing, care of mouth/teeth, toileting, grooming, dressing, etc )  - Assess/evaluate cause of self-care deficits   - Assess range of motion  - Assess patient's mobility; develop plan if impaired  - Assess patient's need for assistive devices and provide as appropriate  - Encourage maximum independence but intervene and supervise when necessary  - Involve family in performance of ADLs  - Assess for home care needs following discharge   - Consider OT consult to assist with ADL evaluation and planning for discharge  - Provide patient education as appropriate  Outcome: Progressing  Goal: Maintains/Returns to pre admission functional level  Description: INTERVENTIONS:  - Perform BMAT or MOVE assessment daily    - Set and communicate daily mobility goal to care team and patient/family/caregiver     - Collaborate with rehabilitation services on mobility goals if consulted  - Out of bed for toileting  - Record patient progress and toleration of activity level   Outcome: Progressing     Problem: PAIN - ADULT  Goal: Verbalizes/displays adequate comfort level or baseline comfort level  Description: Interventions:  - Encourage patient to monitor pain and request assistance  - Assess pain using appropriate pain scale  - Administer analgesics based on type and severity of pain and evaluate response  - Implement non-pharmacological measures as appropriate and evaluate response  - Consider cultural and social influences on pain and pain management  - Notify physician/advanced practitioner if interventions unsuccessful or patient reports new pain  Outcome: Progressing     Problem: INFECTION - ADULT  Goal: Absence or prevention of progression during hospitalization  Description: INTERVENTIONS:  - Assess and monitor for signs and symptoms of infection  - Monitor lab/diagnostic results  - Monitor all insertion sites, i e  indwelling lines, tubes, and drains  - Monitor endotracheal if appropriate and nasal secretions for changes in amount and color  - [place] appropriate cooling/warming therapies per order  - Administer medications as ordered  - Instruct and encourage patient and family to use good hand hygiene technique  - Identify and instruct in appropriate isolation precautions for identified infection/condition  Outcome: Progressing  Goal: Absence of fever/infection during neutropenic period  Description: INTERVENTIONS:  - Monitor WBC    Outcome: Progressing     Problem: SAFETY ADULT  Goal: Patient will remain free of falls  Description: INTERVENTIONS:  - Educate patient/family on patient safety including physical limitations  - Instruct patient to call for assistance with activity   - Consult OT/PT to assist with strengthening/mobility   - Keep Call bell within reach  - Keep bed low and locked with side rails adjusted as appropriate  - Keep care items and personal belongings within reach  - Initiate and maintain comfort rounds  - Make Fall Risk Sign visible to staff  - Apply yellow socks and bracelet for high fall risk patients  - Consider moving patient to room near nurses station  Outcome: Progressing  Goal: Maintain or return to baseline ADL function  Description: INTERVENTIONS:  -  Assess patient's ability to carry out ADLs; assess patient's baseline for ADL function and identify physical deficits which impact ability to perform ADLs (bathing, care of mouth/teeth, toileting, grooming, dressing, etc )  - Assess/evaluate cause of self-care deficits   - Assess range of motion  - Assess patient's mobility; develop plan if impaired  - Assess patient's need for assistive devices and provide as appropriate  - Encourage maximum independence but intervene and supervise when necessary  - Involve family in performance of ADLs  - Assess for home care needs following discharge   - Consider OT consult to assist with ADL evaluation and planning for discharge  - Provide patient education as appropriate  Outcome: Progressing  Goal: Maintains/Returns to pre admission functional level  Description: INTERVENTIONS:  - Perform BMAT or MOVE assessment daily    - Set and communicate daily mobility goal to care team and patient/family/caregiver     - Collaborate with rehabilitation services on mobility goals if consulted  - Out of bed for toileting  - Record patient progress and toleration of activity level   Outcome: Progressing     Problem: DISCHARGE PLANNING  Goal: Discharge to home or other facility with appropriate resources  Description: INTERVENTIONS:  - Identify barriers to discharge w/patient and caregiver  - Arrange for needed discharge resources and transportation as appropriate  - Identify discharge learning needs (meds, wound care, etc )  - Arrange for interpretive services to assist at discharge as needed  - Refer to Case Management Department for coordinating discharge planning if the patient needs post-hospital services based on physician/advanced practitioner order or complex needs related to functional status, cognitive ability, or social support system  Outcome: Progressing     Problem: Knowledge Deficit  Goal: Patient/family/caregiver demonstrates understanding of disease process, treatment plan, medications, and discharge instructions  Description: Complete learning assessment and assess knowledge base    Interventions:  - Provide teaching at level of understanding  - Provide teaching via preferred learning methods  Outcome: Progressing     Problem: CARDIOVASCULAR - ADULT  Goal: Maintains optimal cardiac output and hemodynamic stability  Description: INTERVENTIONS:  - Monitor I/O, vital signs and rhythm  - Monitor for S/S and trends of decreased cardiac output  - Administer and titrate ordered vasoactive medications to optimize hemodynamic stability  - Assess quality of pulses, skin color and temperature  - Assess for signs of decreased coronary artery perfusion  - Instruct patient to report change in severity of symptoms  Outcome: Progressing  Goal: Absence of cardiac dysrhythmias or at baseline rhythm  Description: INTERVENTIONS:  - Continuous cardiac monitoring, vital signs, obtain 12 lead EKG if ordered  - Administer antiarrhythmic and heart rate control medications as ordered  - Monitor electrolytes and administer replacement therapy as ordered  Outcome: Progressing     Problem: GENITOURINARY - ADULT  Goal: Urinary catheter remains patent  Description: INTERVENTIONS:  - Assess patency of urinary catheter  - If patient has a chronic morales, consider changing catheter if non-functioning  - Follow guidelines for intermittent irrigation of non-functioning urinary catheter  Outcome: Progressing

## 2021-08-24 NOTE — ASSESSMENT & PLAN NOTE
- According with JNC guidelines, patient has been reaching the goal of <140/90 during admission   - Outpatient management: Amlodipine 5 mg daily and Coreg 25 mg Bid  Plan   · Will continue amlodipine 5 mg daily and COREG 25 mg Bid    · Low-salt diet  · Monitor blood pressure t i d

## 2021-08-24 NOTE — ASSESSMENT & PLAN NOTE
Patient presented with a mildly prolonged QT interval of 486     - Continue to monitor on Telemetry and replete electrolytes as necessary

## 2021-08-24 NOTE — ASSESSMENT & PLAN NOTE
Lab Results   Component Value Date    HGBA1C 7 1 (H) 08/03/2021       Recent Labs     08/27/21  1118 08/27/21  1544 08/27/21 2001 08/28/21  0520   POCGLU 222* 208* 229* 124     - Home regiment: Lantus 50 units b i d , Humalog 10 units t i d , Trulicity once weekly  - During admission, patient was started on Lantus 40 units Bid, SS and diabetic diet  However, glycemic levels were above goal with that regimen   On Friday, Lantus was increased to 50 units Bid  Glycemic control has been improving since then  - Currently at goal as per ADA (-180)     Plan   · Continue Lantus 50 units BID    · Continue SSI   · Insulin Sliding Scale @ weight based Algorithm 3 with accuchecks ACHS  · Diabetic Diet with card restrcition level 3  · F/u with Endocrinology as outpatient

## 2021-08-24 NOTE — PLAN OF CARE
Problem: PAIN - ADULT  Goal: Verbalizes/displays adequate comfort level or baseline comfort level  Description: Interventions:  - Encourage patient to monitor pain and request assistance  - Assess pain using appropriate pain scale  - Administer analgesics based on type and severity of pain and evaluate response  - Implement non-pharmacological measures as appropriate and evaluate response  - Consider cultural and social influences on pain and pain management  - Notify physician/advanced practitioner if interventions unsuccessful or patient reports new pain  Outcome: Progressing     Problem: INFECTION - ADULT  Goal: Absence or prevention of progression during hospitalization  Description: INTERVENTIONS:  - Assess and monitor for signs and symptoms of infection  - Monitor lab/diagnostic results  - Monitor all insertion sites, i e  indwelling lines, tubes, and drains  - Monitor endotracheal if appropriate and nasal secretions for changes in amount and color  - [place] appropriate cooling/warming therapies per order  - Administer medications as ordered  - Instruct and encourage patient and family to use good hand hygiene technique  - Identify and instruct in appropriate isolation precautions for identified infection/condition  Outcome: Progressing

## 2021-08-24 NOTE — ASSESSMENT & PLAN NOTE
- Resolved  - Calcium levels since admission: 8 3 > 8 9 > 8 6  - Due to high PTH, high Phosphorus, low Vit D and low Ca levels, hypocalcemia is likely related to secondary hypoparathyroidism, as a result of CKD  - Hypocalcemia has resolved after administration of Calcium Gluconate 1 g   Plan  · Continue Vitamin D3 2000 units daily   · Continue to monitor Creatinine and Calcium levels with daily CMP

## 2021-08-24 NOTE — ASSESSMENT & PLAN NOTE
Normocytic normochromic  Likely secondary to CKD  Will repeat CBC in a m  Low threshold for blood transfusion with hemoglobin less than 8

## 2021-08-24 NOTE — ASSESSMENT & PLAN NOTE
Weight readings since admission (Kg): 154 > 142 > 141 > 135 > 133  Baseline weight (8/3/2021, in Kg): 132    - Presented with new onset of acute decompensated heart failure  Possibly secondary to UTI vs cardiac ischemia vs excessive fluid intake  - Started treatment with IV Lasix 40 mg and switched to PO Torsemide 20 mg on Friday as per Cardiology recommendation  A cath is planned at [facility] on Monday and patient will be transferred today  - Clinically improved, volume status closer to baseline  Diuresed approx  12 liters  Approaching her normative weight  Plan:   · Continue Telemetry  · Supplemental O2 to maintain saturation > 92%  · Continue torsemide 20mg BID  · Fluid restriction @ 1200 cc/day and low salt diet  · Strict I&O and Daily Weights   · Continue other home medications  · Cath on Monday as per Cardiology at [facility]  Hold diuretic in am of catherization (Monday morning)  · NPO Sunday night  · Hold diuretic on the morning of the procedure    · CBC, CMP, Mg, Phos in am

## 2021-08-24 NOTE — PLAN OF CARE
Problem: OCCUPATIONAL THERAPY ADULT  Goal: Performs self-care activities at highest level of function for planned discharge setting  See evaluation for individualized goals  Description: Treatment Interventions: ADL retraining, Functional transfer training, UE strengthening/ROM, Endurance training, Continued evaluation, Energy conservation, Activityengagement          See flowsheet documentation for full assessment, interventions and recommendations  Note: Limitation: Decreased high-level ADLs, Decreased UE strength, Decreased ADL status, Decreased endurance  Prognosis: Good  Assessment: Pt is a 64 y o  female seen for OT evaluation s/p admit to [facility] on 8/23/2021 w/ New onset of congestive heart failure ([facility] [address] )  See above for extensive list of comorbidities affecting Pt's functional performance at time of assessment  Personal factors affecting Pt at time of IE include:difficulty performing ADLS, difficulty performing IADLS  and health management   Prior to admission, Pt was living at home with family, reports her daughter assists with all IADLs, ADLs as needed including toileting, bathing, and dressing  Pt reports typically using a cane, however does have a walker as well  Upon evaluation: Pt requires CGA for transfers to standing and toilet transfer, Jose Carlos for hygiene following bowel movement and clothing management  Pt's primary limitation at this time is decreased endurance and activity tolerance  The following deficits impact occupational performance: weakness, decreased strength, decreased balance and decreased tolerance  Pt to benefit from continued skilled OT services while in the hospital to address deficits as defined above and maximize level of functional independence w ADL's and functional mobility  Occupational performance areas to address include: bathing/shower, toilet hygiene, dressing, health maintenance, functional mobility and clothing management   From OT standpoint, recommendation at time of d/c would be home with resumed assist from family         OT Discharge Recommendation: No rehabilitation needs

## 2021-08-24 NOTE — ASSESSMENT & PLAN NOTE
- Creatinine levels since admission: 2 37 (CrCL 58)>>2 4 >2 3 >2 13  > 2 22 > 2 44 (Baseline= 2 2-2 6 )      Patient is back to baseline creatinine  Baseline creatinine 2 3-2 6 from May  Patient had detailed discussion with nephrology and cardiology regarding risk of ALFRED with contrast exposure and small risk of dialysis is she undergoes catheterization and verbalized understanding  Plan   - Avoid all nephrotoxic medications as possible  - Closely monitor renal function while diuresing   - Avoid ACE-inhibitor or ARB for time being   - Monitor urine output closely   - Mild increase in creatinine today since admission  Recommend Nephrology consult for adjustment of diuretics if creatinine continues to increase

## 2021-08-24 NOTE — ASSESSMENT & PLAN NOTE
- Likely secondary to diabetes  - Has suprapubic catheter placed in June 2021 and it is changed monthly     - Last changed on 08/03/2021 by [facility]  - Catheter site clean with no dressing

## 2021-08-24 NOTE — ASSESSMENT & PLAN NOTE
Status post PCI x5, with stents placed in 2012 and 2017 ([place])  History of abnormal EKG reported by patient in the past (no baseline EKG seen on file)  Troponin negative x 3  EKG:  NSR, LBBB     Plan  - Continue isosorbide mononitrate 30 mg daily, clopidogrel 75 mg daily, aspirin 81 mg daily, Metoprolol 25 mg daily andamlodipine 5 mg daily   - GTN 0 4 mg sublingual p r n   - Pt will be transferred today [place] [facility] [place] on Monday (8/30) as per Cardiology recommendation

## 2021-08-24 NOTE — UTILIZATION REVIEW
Inpatient Admission Authorization Request   NOTIFICATION OF INPATIENT ADMISSION/INPATIENT AUTHORIZATION REQUEST   SERVICING FACILITY:   [address] [place] [address]  [address] , [facility], [address] [place] [address]  Tax ID: [id number]  NPI: [id number]  Place of Service: Inpatient [address]  Place of Service Code: 24     ATTENDING PROVIDER:  Attending Name and NPI#: Law Stephenson Md [[id number]]  Address: [address] , [facility], [address] [place] [address]  Phone: [phone]     UTILIZATION REVIEW CONTACT:  Willie Cunha, Utilization   Network Utilization Review Department  Phone: [phone]  Fax [phone]  Email: [email or web address]     PHYSICIAN ADVISORY SERVICES:  FOR [id number] REVIEW - MEDICAL NECESSITY DENIAL  Phone: [phone]  Fax: [phone]  Email: [email or web address]     TYPE OF REQUEST:  Inpatient Status     ADMISSION INFORMATION:  ADMISSION DATE/TIME: 8/23/21  9:57 PM  PATIENT DIAGNOSIS CODE/DESCRIPTION:  Shortness of breath [R06 02]  CHF (congestive heart failure) ([facility]) [I50 9]  UTI (urinary tract infection) [N39 0]  Essential hypertension [I10]  Dyspnea on exertion [R06 00]  Chronic kidney disease [N18 9]  CKD (chronic kidney disease) stage 4, GFR 15-29 ml/min ([facility]) [N18 4]  Acute exacerbation of CHF (congestive heart failure) ([facility]) [I50 9]  Coronary artery disease involving native heart without angina pectoris, unspecified vessel or lesion type [I25 10]  High output congestive heart failure ([address] ) [I50 83]  DISCHARGE DATE/TIME: No discharge date for patient encounter  DISCHARGE DISPOSITION (IF DISCHARGED): Home/Self Care     IMPORTANT INFORMATION:  Please contact the Willie Cunha directly with any questions or concerns regarding this request  Department voicemails are confidential     Send requests for admission clinical reviews, concurrent reviews, approvals, and administrative denials due to lack of clinical to fax [phone]

## 2021-08-24 NOTE — ED ATTENDING ATTESTATION
I was the attending physician on duty at the time the patient visited the emergency department  The patient was evaluated and dispositioned by the APC  I was personally available for consultation  I am administratively signing the chart after the fact      Rebecca Costello MD

## 2021-08-24 NOTE — CONSULTS
Cardiology Consultation  Nickie Hey, MD Junnie Grave, MD Chase Late, DO, Karyle Cleveland Mansoor, MD Izora Moore, DO, Meir Jay DO, [facility] [place]  ----------------------------------------------------------------  [address] [facility]  [address] [place] [address], [address] [facility] 64 y o  female MRN: [id number]  Unit/Bed#: 7T [facility] 953-18 Encounter: [id number]      08/24/21    Referring Physician: Stefanie Scott MD    Chief Complain/Reason for Referal: Heart Failure    IMPRESSION:  1  Acute exacerbation heart failure, suspect may be new systolic heart failure-echo pending  2  Coronary artery disease status post PCI x5 to unknown vessels in 2012 and 2017 in [address] symptoms that are concerning for angina  3  Diabetes, uncontrolled   4  Hypertension, uncontrolled   5  Dyslipidemia  6  Chronic kidney disease  7  Neurogenic bladder status post suprapubic catheter      DISCUSSION/RECOMMENDATIONS:   She was doing well since starting Imdur however she subsequently has had accumulation of excess volume and presents today with acute exacerbation of heart failure  She has about 20-25 lb over her baseline weight   She has bilateral lower extremity pitting edema   Fortunately troponins are negative   Agree with IV Lasix 40 twice a day    She currently has greater than 1 L of urine in her suprapubic catheter bag   Continue to monitor urine output, renal function, electrolytes   If renal function worsens any further from baseline, would have Nephrology see the patient as well   We will check new echocardiogram today to re-evaluate her LV systolic function and diastolic function   Restart beta-blocker, tele has been on the more tachycardic side since this was withheld on admission   Continue aspirin 81, Lipitor 40, amlodipine, Plavix, Imdur    She has been having symptoms of angina and catheterization has been recommended, her underlying renal dysfunction was presenting us from pursuing this further however she states she spoke with her nephrologist who has indicated to her she would be cleared for catheterization at this point  Would plan to diurese her with IV Lasix until euvolemic and ultimately plan for transfer to [place] for cardiac catheterization given this acute exacerbation of heart failure, with prior significant obstructive epicardial coronary stenosis and 5 stents in the past   There is a high likelihood that she may have InStent restenoses and may need more advanced PCI therapy such as rotational atherectomy  Gayathri Nguyen DO, [facility] [place]  -------------------------------------------  EKG:  Sinus rhythm Anterior infarct with left bundle-branch block  TELE:  Sinus tachycardia    ECHO:  EF 60%    ======================================================    HPI:  I am seeing this patient in cardiology consultation for:  Heart failure    Max Danielle is a 64 y o  female with coronary artery disease status post stents to unknown vessels in 2012 as well as 2017, diabetes, hypertension, dyslipidemia, osteoarthritis, obesity who presents today for re-evaluation   She states that she has 5 stents in her heart   Over the past several months she has had some episodes of chest pain/pressure that occurs at rest and with minor exertion and is relieved immediately with sublingual nitroglycerin   She states this feels similar to how it has felt in the past when she presented for care and ultimately required a stent to be placed        Because of her symptoms I had recommended a cardiac catheterization   Unfortunately her kidney function had worsened and she was having urinary retention  Olivia Durant has been seen by Urology and now has a suprapubic catheter in place  At her last visit I started her on isosorbide mononitrate 30 milligrams daily  Over the past 2-3 weeks she noticed that she has been gaining weight has been most noticeable over the past 4 days or so    She noticed that she was having significant lower extremity swelling and worsening dyspnea with exertion to the point where she can only go up 1 flight of steps before being extremely out of breath  Because of this she presented to the hospital, she was found to be acutely volume overloaded with an acute exacerbation heart failure and admitted placed on IV Lasix  She does note associated chest tightness and chest pressure with her dyspnea on exertion as well  She has been taking sublingual nitro    She states that this did not really help her symptoms        Past Medical History:   Diagnosis Date    Abnormal liver function     Anemia     Anxiety     Arthritis     Chronic kidney disease     stage 3    Chronic narcotic dependence (HCC)     Chronic pain disorder     lower back, hands , neck and knees    Coronary artery disease     Depression     Diabetes mellitus ([address] )     GERD (gastroesophageal reflux disease)     no meds at present    Heart murmur     murmur    Hyperlipidemia     Hypertension     Neurogenic bladder     Open toe wound 12/2020    right big toe open calus but is dry at present    Renal disorder     Shortness of breath     exertion    Sleep apnea     doesn't use cpap    Suprapubic catheter ([facility] )          Scheduled Meds:  Current Facility-Administered Medications   Medication Dose Route Frequency Provider Last Rate    amLODIPine  5 mg Oral Daily Catarino Mendosa MD      aspirin  81 mg Oral Daily Catarino Mendosa MD      atorvastatin  40 mg Oral After Orvel Doyle Phillip MD      carvedilol  25 mg Oral BID With Meals Payton Dozier MD      cefTRIAXone  1,000 mg Intravenous Q24H Catarino Mendosa MD      clopidogrel  75 mg Oral Daily Trevor Phillip MD      docusate sodium  100 mg Oral BID Catarino Mendosa MD      furosemide  40 mg Intravenous Once Payton Dozier MD      furosemide  40 mg Intravenous BID (diuretic) Nilson Ramirez MD  heparin (porcine)  5,000 Units Subcutaneous Q8H [address] Gold Phillip MD      insulin glargine  26 Units Subcutaneous Q12H [address] Gold Phillip MD      insulin lispro  2-12 Units Subcutaneous TID AC Catarino Stinson MD      isosorbide mononitrate  30 mg Oral Daily Catarino Stinson MD      levothyroxine  50 mcg Oral Daily Catarino Stinson MD      nitroglycerin  0 4 mg Sublingual Q5 Min PRN Catarino Phillip MD       Continuous Infusions:   PRN Meds: nitroglycerin  Allergies   Allergen Reactions    Codeine      Other reaction(s): Nausea and Vomiting    Latex Itching     I reviewed the Home Medication list in the chart       Family History   Problem Relation Age of Onset    Stroke Father     Heart disease Father     No Known Problems Mother     No Known Problems Sister     No Known Problems Daughter     No Known Problems Maternal Grandmother     No Known Problems Maternal Grandfather     No Known Problems Paternal Grandmother     No Known Problems Paternal Grandfather     No Known Problems Maternal Aunt     No Known Problems Maternal Aunt     No Known Problems Maternal Aunt     No Known Problems Maternal Aunt     No Known Problems Maternal Aunt     No Known Problems Maternal Aunt     No Known Problems Paternal Aunt        Social History     Socioeconomic History    Marital status:      Spouse name: Not on file    Number of children: Not on file    Years of education: Not on file    Highest education level: Not on file   Occupational History    Not on file   Tobacco Use    Smoking status: Former Smoker     Packs/day: 1 00     Years: 35 00     Pack years: 35 00     Types: Cigarettes     Quit date:      Years since quittin 6    Smokeless tobacco: Never Used   Vaping Use    Vaping Use: Never used   Substance and Sexual Activity    Alcohol use: Not Currently    Drug use: Never    Sexual activity: Not Currently Other Topics Concern    Not on file   Social History Narrative    Not on file     Social Determinants of Health     Financial Resource Strain:     Difficulty of Paying Living Expenses:    Food Insecurity:     Worried About Running Out of Food in the Last Year:     [address] in the Last Year:    Transportation Needs:     Lack of Transportation (Medical):  Lack of Transportation (Non-Medical):    Physical Activity:     Days of Exercise per Week:     Minutes of Exercise per Session:    Stress:     Feeling of Stress :    Social Connections:     Frequency of Communication with Friends and Family:     Frequency of Social Gatherings with Friends and Family:     Attends Anabaptism Services:     Active Member of Clubs or Organizations:     Attends Club or Organization Meetings:     Marital Status:    Intimate Partner Violence:     Fear of Current or Ex-Partner:     Emotionally Abused:     Physically Abused:     Sexually Abused:        Review of Systems   Review of Systems   Constitutional: Negative for chills and fever  HENT: Negative for facial swelling and sore throat  Eyes: Negative for visual disturbance  Respiratory: Positive for chest tightness and shortness of breath  Negative for cough and wheezing  Cardiovascular: Positive for chest pain and leg swelling  Negative for palpitations  Gastrointestinal: Negative for abdominal pain, blood in stool, constipation, diarrhea, nausea and vomiting  Endocrine: Negative for cold intolerance and heat intolerance  Genitourinary: Negative for decreased urine volume, difficulty urinating, dysuria and hematuria  Musculoskeletal: Negative for arthralgias, back pain and myalgias  Skin: Negative for rash  Neurological: Negative for dizziness, syncope, weakness and numbness  Psychiatric/Behavioral: Negative for agitation, behavioral problems and confusion  The patient is not nervous/anxious         Vitals:    08/24/21 0700   BP: 168/65 Pulse: 74   Resp: 18   Temp: (!) 96 °F (35 6 °C)   SpO2: 92%     I/O       08/22 0701 - 08/23 0700 08/23 0701 - 08/24 0700 08/24 0701 - 08/25 0700    P  O   240     Total Intake(mL/kg)  240 (1 7)     Urine (mL/kg/hr)  1850     Total Output  1850     Net  -1610                Weight (last 2 days)     Date/Time   Weight    08/24/21 0536   (!) 142 (313 05)    08/24/21 0002   (!) 142 (312 61)    08/23/21 2024   (!) 154 (339 25)              Physical Exam  Constitutional: awake, alert and oriented, in no acute distress, no obvious deformities, obese female  Head: Normocephalic, without obvious abnormality, atraumatic  Eyes: conjunctivae clear and moist  Sclera anicteric  No xanthelasmas  Pupils equal bilaterally  Extraocular motions are full  Ear nose mouth and throat: ears are symmetrical bilaterally, hearing appears to be equal bilaterally, no nasal discharge or epistaxis, oropharynx is clear with moist mucous membranes  Neck:  Trachea is midline, neck is supple, no thyromegaly or significant lymphadenopathy, there is full range of motion  Lungs:  Decreased breath sounds bilaterally, no wheezes, positive rales at the bases bilaterally, no rhonchi, no accessory muscle use, breathing is nonlabored  Heart: regular rate and rhythm, S1, S2 normal, no murmur, no click, no rub and no gallop, 3+ bilateral lower extremity pitting edema  Abdomen:  Obese, soft, non-tender; bowel sounds normal; no masses,  no organomegaly  Psychiatric:  Patient is oriented to time, place, person, mood/affect is negative for depression, anxiety, agitation, appears to have appropriate insight  Skin: Skin is warm, dry, intact  No obvious rashes or lesions on exposed extremities  Nail beds are pink with no cyanosis or clubbing      Results from last 7 days   Lab Units 08/24/21  0430 08/23/21  2046   WBC Thousand/uL 9 60 11 00   HEMOGLOBIN g/dL 8 5* 8 6*   HEMATOCRIT % 25 1* 25 7*   PLATELETS Thousands/uL 220 230   NEUTROS PCT % 71* 76*   MONOS PCT % 7 8     Results from last 7 days   Lab Units 08/24/21  0430 08/23/21  2046   POTASSIUM mmol/L 4 5 4 9   CHLORIDE mmol/L 109* 111*   CO2 mmol/L 22 22   BUN mg/dL 57* 56*   CREATININE mg/dL 2 35* 2 37*   CALCIUM mg/dL 8 3* 8 3*     Results from last 7 days   Lab Units 08/24/21  0430 08/23/21  2046   POTASSIUM mmol/L 4 5 4 9   CHLORIDE mmol/L 109* 111*   CO2 mmol/L 22 22   BUN mg/dL 57* 56*   CREATININE mg/dL 2 35* 2 37*   CALCIUM mg/dL 8 3* 8 3*   ALK PHOS U/L 100 98   ALT U/L 16 16   AST U/L 35 24     No results found for: TROPONINT      Results from last 7 days   Lab Units 08/24/21  0430   TROPONIN I ng/mL <0 01         Results from last 7 days   Lab Units 08/23/21  2046   INR  1 03               I have personally reviewed the EKG, CXR and Telemetry images directly        Patient Active Problem List    Diagnosis Date Noted    New onset of congestive heart failure ([place] [address] ) 08/24/2021    Hypocalcemia 08/24/2021    Prolonged QT interval 08/24/2021    Urinary tract infection associated with cystostomy catheter ([place] [address] ) 08/23/2021    Neurogenic bladder 08/23/2021    ARF (acute renal failure) ([place] [address] ) 06/23/2021    Morbid obesity with BMI of 45 0-49 9, adult ([place] [address] ) 06/15/2021    History of heart artery stent 06/15/2021    CKD (chronic kidney disease) stage 4, GFR 15-29 ml/min ([place] [address] ) 06/04/2021    Memory impairment 05/26/2021    Chronic bronchitis ([place] [address] ) 05/25/2021    Severe episode of recurrent major depressive disorder, without psychotic features ([place] [address] ) 05/25/2021    Skin ulcer of hand, limited to breakdown of skin ([place] [address] ) 02/25/2021    HTN (hypertension) 12/21/2020    Diabetic ulcer of toe of right foot associated with type 2 diabetes mellitus, with muscle involvement without evidence of necrosis ([place] [address] ) 11/25/2020    Head lump 11/11/2020    Need for follow-up by  11/11/2020    Normochromic normocytic anemia 09/09/2020    CKD (chronic kidney disease) stage 3, GFR 30-59 ml/min ([facility] ) 09/09/2020    Abnormal LFTs 09/09/2020    S/P cervical spinal fusion 09/09/2020    Major depressive disorder 09/02/2020    Type 2 diabetes mellitus with diabetic polyneuropathy, with long-term current use of insulin ([facility] ) 09/02/2020    Anxiety 09/02/2020    CAD (coronary artery disease) 09/02/2020    Osteoarthritis of left knee 09/02/2020    Healthcare maintenance 09/02/2020    Cellulitis of finger of right hand 08/26/2020       Portions of the record may have been created with voice recognition software  Occasional wrong word or "sound a like" substitutions may have occurred due to the inherent limitations of voice recognition software  Read the chart carefully and recognize, using context, where substitutions have occurred      Viri Olson DO, [facility]  8/24/2021 8:44 AM

## 2021-08-24 NOTE — ED NOTES
[place] bag changed from leg bag to larger bag per patient request       Cinthia Hodgkins, RN  08/23/21 2139

## 2021-08-24 NOTE — ED NOTES
Cardiac monitor intact, continous pulse ox  Pt without complaints of SOB at present  Call bell in reach        Iza Haddad RN  08/23/21 9074

## 2021-08-24 NOTE — ASSESSMENT & PLAN NOTE
Urinalysis positive for blood, nitrates, white blood cells and bacteria  Cultures were positive for >100,000 cfu/ml Klebsiella susceptible to Ceftriaxone  Plan   · Continue Ceftriaxone 1 g IV daily  · Urology recommends maintain ABX treatment untilcatheter can be replaced  Urologist on call today was contacted and their team will replace it on Monday morning at [facility]

## 2021-08-24 NOTE — DISCHARGE SUMMARY
Discharge Summary - Marcia Guadarrama    Patient Information: Angie Handley 64 y o  female MRN: [id number]  Unit/Bed#: 7T [facility] 708-01 Encounter: [id number]    Discharging Physician / Practitioner: Jen Peralta MD  PCP: Patsy Lindsey [place]  Admission Date:   Admission Orders (From admission, onward)     Ordered        08/23/21 2158  Inpatient Admission  Once                   Discharge Date: 08/29/2021    Reason for Admission: New onset Heart Failure with complicated UTI    Discharge Diagnoses:     Principal Problem:    New onset of congestive heart failure ([facility] )  Active Problems:    CAD (coronary artery disease)    Type 2 diabetes mellitus with diabetic polyneuropathy, with long-term current use of insulin ([facility])    CKD (chronic kidney disease) stage 4, GFR 15-29 ml/min ([facility])    Urinary tract infection associated with cystostomy catheter ([facility])    Normochromic normocytic anemia    HTN (hypertension)    Hypocalcemia    Neurogenic bladder    Prolonged QT interval  Resolved Problems:    High output congestive heart failure ([facility] )    Anemia due to stage 4 chronic kidney disease ([facility] )        * New onset of congestive heart failure ([facility] )  Assessment & Plan  Weight readings since admission (Kg): 154 > 142 > 141 > 135 > 133  Baseline weight (8/3/2021, in Kg): 132    - Presented with new onset of acute decompensated heart failure  Possibly secondary to UTI vs cardiac ischemia vs excessive fluid intake  - Started treatment with IV Lasix 40 mg and switched to PO Torsemide 20 mg on Friday as per Cardiology recommendation  A cath is planned at [facility] on Monday and patient will be transferred today  - Clinically improved, volume status closer to baseline  Diuresed approx  12 liters  Approaching her normative weight          Plan:   · Continue Telemetry  · Supplemental O2 to maintain saturation > 92%  · Continue torsemide 20mg BID  · Fluid restriction @ 1200 cc/day and low salt diet  · Strict I&O and Daily Weights   · Continue other home medications  · Cath on Monday as per Cardiology at [address] [place] [address]  Hold diuretic in am of catherization (Monday morning)  · NPO Sunday night  · Hold diuretic on the morning of the procedure  · CBC, CMP, Mg, Phos in am      CAD (coronary artery disease)  Assessment & Plan  Status post PCI x5, with stents placed in 2012 and 2017 ([place])  History of abnormal EKG reported by patient in the past (no baseline EKG seen on file)  Troponin negative x 3  EKG:  NSR, LBBB     Plan  - Continue isosorbide mononitrate 30 mg daily, clopidogrel 75 mg daily, aspirin 81 mg daily, Metoprolol 25 mg daily andamlodipine 5 mg daily   - GTN 0 4 mg sublingual p r n   - Pt will be transferred today to [address] [place] [address] for a Cath on Monday (8/30) as per Cardiology recommendation  Type 2 diabetes mellitus with diabetic polyneuropathy, with long-term current use of insulin [facility])  Assessment & Plan  Lab Results   Component Value Date    HGBA1C 7 1 (H) 08/03/2021       Recent Labs     08/27/21  1118 08/27/21  1544 08/27/21 2001 08/28/21  0520   POCGLU 222* 208* 229* 124     - Home regiment: Lantus 50 units b i d , Humalog 10 units t i d , Trulicity once weekly  - During admission, patient was started on Lantus 40 units Bid, SS and diabetic diet  However, glycemic levels were above goal with that regimen   On Friday, Lantus was increased to 50 units Bid  Glycemic control has been improving since then  - Currently at goal as per ADA (-180)     Plan   · Continue Lantus 50 units BID  · Continue SSI   · Insulin Sliding Scale @ weight based Algorithm 3 with accuchecks ACHS  · Diabetic Diet with card restrcition level 3  · F/u with Endocrinology as outpatient      CKD (chronic kidney disease) stage 4, GFR 15-29 ml/min ([facility])  Assessment & Plan  - Creatinine levels since admission: 2 37 (CrCL 58)>>2 4 >2 3 >2 13  > 2 22 > 2 44 (Baseline= 2 2-2 6 )      Patient is back to baseline creatinine  Baseline creatinine 2 3-2 6 from May  Patient had detailed discussion with nephrology and cardiology regarding risk of ALFRED with contrast exposure and small risk of dialysis is she undergoes catheterization and verbalized understanding  Plan   - Avoid all nephrotoxic medications as possible  - Closely monitor renal function while diuresing   - Avoid ACE-inhibitor or ARB for time being   - Monitor urine output closely   - Mild increase in creatinine today since admission  Recommend Nephrology consult for adjustment of diuretics if creatinine continues to increase  Urinary tract infection associated with cystostomy catheter [facility])  Assessment & Plan  Urinalysis positive for blood, nitrates, white blood cells and bacteria  Cultures were positive for >100,000 cfu/ml Klebsiella susceptible to Ceftriaxone  Plan   · Continue Ceftriaxone 1 g IV daily  · Urology recommends maintain ABX treatment untilcatheter can be replaced  Urologist on call today was contacted and their team will replace it on Monday morning at [facility]  Normochromic normocytic anemia  Assessment & Plan  Most recent CBC shows: Hb 8 8, Ht 26 1 and MCV 95  Labs were also significant for low iron levels at 26 and TIBC at 234  - Normocytic normochromic, likely secondary to CKD  Daily CBCs were ordered during admission for f/u on anemia   - No obvious source of bleeding, however no prior colonoscopy done  Plan:  - Ferrous sulfate 325mg every other day   - Follow up on FOBT ordered on 8/24  - Low threshold for blood transfusion with hemoglobin less than 8    - Recommend colonoscopy to be done as outpatient     HTN (hypertension)  Assessment & Plan  - According with JNC guidelines, patient has been reaching the goal of <140/90 during admission   - Outpatient management: Amlodipine 5 mg daily and Coreg 25 mg Bid  Plan   · Will continue amlodipine 5 mg daily and COREG 25 mg Bid    · Low-salt diet  · Monitor blood pressure t i d  Hypocalcemia  Assessment & Plan  - Resolved  - Calcium levels since admission: 8 3 > 8 9 > 8 6  - Due to high PTH, high Phosphorus, low Vit D and low Ca levels, hypocalcemia is likely related to secondary hypoparathyroidism, as a result of CKD  - Hypocalcemia has resolved after administration of Calcium Gluconate 1 g   Plan  · Continue Vitamin D3 2000 units daily   · Continue to monitor Creatinine and Calcium levels with daily CMP  Neurogenic bladder  Assessment & Plan  - Likely secondary to diabetes  - Has suprapubic catheter placed in June 2021 and it is changed monthly     - Last changed on 08/03/2021 by [facility] services  - Catheter site clean with no dressing  Prolonged QT interval  Assessment & Plan  Patient presented with a mildly prolonged QT interval of 486     - Continue to monitor on Telemetry and replete electrolytes as necessary  Consultations During Hospital Stay:  · Cardiology, Nephrology and Urology  Procedures Performed:   · None    Significant Findings / Test Results:   ·   · Calcium 8 3  · Hemoglobin 8 6 (normocytic normochromic  · WBC 11 with neutrophilia 76%  · Urinalysis positive blood, nitrites, bacteria, white blood cell  · Oxygen saturation 91% on presentation    Incidental Findings:   · Low Hemoglobin and Hypocalcemia  Test Results Pending at Discharge (will require follow up):   · FOBT  Outpatient Tests Requested:  · Colonoscopy  Outpatient follow-up Requested:  [facility] Endocrinology, Cardiology and Nephrology  Complications:  None  Hospital Course:     Kurt Perkins is a 64 y o  female patient who originally presented to the hospital on 8/23/2021 due to increasing shortness of breath and pressure-like chest pain  She has a PMH of T2DM with insulin, HTN, Hyperlipidemia, CAD (5 stent placements in [place] in 2012 and 2017 to unknown vessels), CKD stage 4, obesity and urinary retention with SPC   Patient reports 10/10 pressure on her chest and increased shortness of breath with exertion  She described weakness of lower extremities and lightheadedness without syncope  Symptoms improved after rest and Nitroglycerin medication  Denied cough, wheezes, nausea, vomiting and abdominal pain  Reported approximately 3 L fluid intake per day  Patient f/u as outpatient with Cardiologist (Dr Radha Reyes) due to previsou history of symptoms of SOB and chest pain/chest pressure on minimal exertion        In the ED, as part of treatment, she received IV Lasix 40 mg for cardiac symptoms   During admission, Cardiology was consulted and recommended to continue  treatment with IV Lasix 40 mg Bid and planned a Catheterization procedure on same hospitalization  Echo done during admission showed decreased EF of 45% from previous EF of 58 6% in January 2021, in addition to LBBB  On 8/27, diuretic treatment was switched to PO Torsemide 20 mg Bid and given the risk associated with contrast induced nephropathy in the setting of her stage IV CKD, Nephrology was consulted for clearance of the procedure and they recommended to continue diuresis until she approaches her baseline weight of 132 kg  Patient also has a suprapubic catheter placed in June 2021 due to neurogenic bladder secondary to diabetic neuropathy  Upon arrival, she reported foul-smelling urine without pyuria  She was treated with IV Ceftriaxone and continues with this regimen until a new suprapubic catheter is in place, as per Urology recommendation  It will occur on Monday, 8/30, before the cardiac procedure  During this hospitalization, patient also presented with normochromic normocytic anemia and was treated with Ferrous Sulfate 325 mg every other day  She was found to be in Hypocalcemia, likely due to Secondary Hypoparathyroidism in the setting of CKD, and was treated with Calcium Gluconate, reaching normal levels after that       To manage T2DM on insulin, Lantus 40 UI Bid with ISS was first tried, but glycemic levels were above goal; for that reason, Lantus was increased to 50 UI bid and glycemia has been improving since then  Upon discharge, patient was asymptomatic, reporting no symptoms of SOB, cough or chest pain  Pulmonary and cardiac examination were unremarkable and Lower legs edema are +1, with improvement since admission  There is an erythematous foul-smelling rash under abdominal skin fold, which is being treated with Nystatin Powder  During this admission, Cardiology and Nephrology discussed with the patient the risks and benefits of the catheterization procedure  She showed understanding and agreement with current plan  Condition at Discharge: good     Discharge Day Visit / Exam:     Vitals: Blood Pressure: 139/65 (08/28/21 2300)  Pulse: 62 (08/29/21 0714)  Temperature: (!) 96 1 °F (35 6 °C) (08/29/21 0714)  Temp Source: Temporal (08/29/21 0714)  Respirations: 17 (08/29/21 0714)  Height: 5' 8" (172 7 cm) (08/24/21 1100)  Weight - Scale: 133 kg (292 lb 15 9 oz) (08/27/21 0600)  SpO2: 93 % (08/29/21 0714)  Exam:   Physical Exam  Constitutional:       General: She is not in acute distress  Appearance: Normal appearance  She is obese  She is not ill-appearing, toxic-appearing or diaphoretic  HENT:      Head: Normocephalic and atraumatic  Nose: Nose normal  No congestion or rhinorrhea  Eyes:      General: No scleral icterus  Right eye: No discharge  Left eye: No discharge  Extraocular Movements: Extraocular movements intact  Conjunctiva/sclera: Conjunctivae normal    Cardiovascular:      Rate and Rhythm: Normal rate and regular rhythm  Pulses: Normal pulses  Heart sounds: Normal heart sounds  No murmur heard  No friction rub  No gallop  Pulmonary:      Effort: Pulmonary effort is normal  No respiratory distress  Breath sounds: No stridor  Rales present  No wheezing or rhonchi  Chest:      Chest wall: No tenderness  Abdominal:      General: Bowel sounds are normal  There is no distension  Palpations: Abdomen is soft  There is no mass  Tenderness: There is abdominal tenderness (Epigastric region  )  There is no guarding or rebound  Hernia: No hernia is present  Musculoskeletal:      Cervical back: Normal range of motion  Skin:     General: Skin is warm and dry  Coloration: Skin is not jaundiced or pale  Findings: Erythema and rash present  Rash is not crusting, macular, nodular, papular, purpuric, pustular, scaling, urticarial or vesicular  Neurological:      General: No focal deficit present  Mental Status: She is alert and oriented to person, place, and time  Cranial Nerves: No cranial nerve deficit  Sensory: No sensory deficit  Motor: No weakness  Psychiatric:         Mood and Affect: Mood normal          Behavior: Behavior normal          Thought Content: Thought content normal          Judgment: Judgment normal          Discussion with Family: Information discussed with patient, who didn't want us to call her daughter  She reported that informed her son  Discharge instructions/Information to patient and family:   See after visit summary for information provided to patient and family        Discharge Medications:    Current Facility-Administered Medications:     amLODIPine (NORVASC) tablet 5 mg, 5 mg, Oral, Daily    aspirin (ECOTRIN LOW STRENGTH) EC tablet 81 mg, 81 mg, Oral, Daily,     atorvastatin (LIPITOR) tablet 40 mg, 40 mg, Oral, After Dinner    carvedilol (COREG) tablet 25 mg, 25 mg, Oral, BID With Meals    cefTRIAXone (ROCEPHIN) IVPB (premix in dextrose) 1,000 mg 50 mL, 1,000 mg, Intravenous, Q24H    cholecalciferol (VITAMIN D3) tablet 2,000 Units, 2,000 Units, Oral, Daily    clopidogrel (PLAVIX) tablet 75 mg, 75 mg, Oral, Daily    dicyclomine (BENTYL) capsule 10 mg, 10 mg, Oral, 4x Daily (AC & HS)    docusate sodium (COLACE) capsule 100 mg, 100 mg, Oral, BID    ferrous sulfate tablet 325 mg, 325 mg, Oral, Every Other Day    heparin (porcine) subcutaneous injection 5,000 Units, 5,000 Units, Subcutaneous, Q8H LIAM    HYDROcodone-acetaminophen (NORCO) 5-325 mg per tablet 1 tablet, 1 tablet, Oral, BID PRN    insulin glargine (LANTUS) subcutaneous injection 50 Units 0 5 mL, 50 Units, Subcutaneous, Q12H LIAM    insulin lispro (HumaLOG) 100 units/mL subcutaneous injection 2-12 Units, 2-12 Units, Subcutaneous, TID AC, 2 Units at 08/28/21 1618 **AND** Fingerstick Glucose (POCT), , , 4x Daily AC and at bedtime, Catarino Mcdonald MD    isosorbide mononitrate (IMDUR) 24 hr tablet 30 mg, 30 mg, Oral, Daily    levothyroxine tablet 50 mcg, 50 mcg, Oral, Daily    nitroglycerin (NITROSTAT) SL tablet 0 4 mg, 0 4 mg, Sublingual, Q5 Min PRN    nystatin (MYCOSTATIN) powder, , Topical, BID    ondansetron (ZOFRAN-ODT) dispersible tablet 4 mg, 4 mg, Oral, Q6H PRN    promethazine (PHENERGAN) injection 25 mg, 25 mg, Intravenous, Q12H PRN    saccharomyces boulardii (FLORASTOR) capsule 250 mg, 250 mg, Oral, BID    torsemide (DEMADEX) tablet 20 mg, 20 mg, Oral, BID (diuretic)    Provisions for Follow-Up Care:  See after visit summary for information related to follow-up care and any pertinent home health orders  Disposition:     [address] Transfer to [facility]  For Discharges to [facility] SNF:   · Not Applicable to this Patient - Not Applicable to this Patient    Planned Readmission: None  Discharge Statement:  I spent 60 minutes discharging the patient  This time was spent on the day of discharge  I had direct contact with the patient on the day of discharge  Greater than 50% of the total time was spent examining patient, answering all patient questions, arranging and discussing plan of care with patient as well as directly providing post-discharge instructions  Additional time then spent on discharge activities      ** Please Note: This note has been constructed using a voice recognition system **    Froilan Jaimes MD  08/29/21  1:44 PM

## 2021-08-24 NOTE — ASSESSMENT & PLAN NOTE
Most recent CBC shows: Hb 8 8, Ht 26 1 and MCV 95  Labs were also significant for low iron levels at 26 and TIBC at 234  - Normocytic normochromic, likely secondary to CKD  Daily CBCs were ordered during admission for f/u on anemia   - No obvious source of bleeding, however no prior colonoscopy done  Plan:  - Ferrous sulfate 325mg every other day   - Follow up on FOBT ordered on 8/24    - Low threshold for blood transfusion with hemoglobin less than 8    - Recommend colonoscopy to be done as outpatient

## 2021-08-24 NOTE — UTILIZATION REVIEW
Initial Clinical Review    Admission: Date/Time/Statement:   Admission Orders (From admission, onward)     Ordered        08/23/21 2158  Inpatient Admission  Once                   Orders Placed This Encounter   Procedures    Inpatient Admission     Standing Status:   Standing     Number of Occurrences:   1     Order Specific Question:   Level of Care     Answer:   Med Surg [16]     Order Specific Question:   Estimated length of stay     Answer:   More than 2 Midnights     Order Specific Question:   Certification     Answer:   I certify that inpatient services are medically necessary for this patient for a duration of greater than two midnights  See H&P and MD Progress Notes for additional information about the patient's course of treatment  ED Arrival Information     Expected Arrival Acuity    - 8/23/2021 20:22 Emergent         Means of arrival Escorted by Service Admission type    Ambulance [facility] Medicine Emergency         Arrival complaint    SOB; Weakness        Chief Complaint   Patient presents with    Shortness of Breath     pt complains of sob and weakness for few days increasing  EMS found pt with oxygen saturation of 91% bilateral lower ext swelling  Initial Presentation:   65y Female to ED presents with increasing shortness of breath on exertion and chest pressure  Also c/o difficulty ambulating to car before arriving to ED, lower extremities weakness and lightheadedness  PMH for DM Type 2, HTN, Dyslipidemia, CAD (5 stent placements in [place] in 2012 and 2017), ), CKD stage 4, and suprapubic catheter  In ED, shortness of breath improved with nitroglycerin 0 4 mg  Also given Iv Lasix 40 mg and Iv antibiotics in ED  Per pt, she drinks 16 oz of fluid 8-10 times a day and sodium is restricted  Uses cane for ambulation at baseline  Notes constipation  Seen by Cardiology on 7/13/21 and had increase shortness of breath at that time   She was given aspirin 81 mg, Lipitor 40 mg, Carvedilol 25 mg and Plavix 75 mg  Started on Isosorbide mononitrate 30 mg daily  Cardiac cath recommended pending clearance by Nephrology  Admit Inpatient level of care for Prolonged QT interval, New onset of CHF, Hypocalcemia, Neurogenic bladder, UTI with cystotomy catheter  Tele monitoring  Continue Iv Lasix 40 mg daily and aspirin  Fluid restriction @ 1200 cc/day and low salt diet  Supplemental O2 to maintain saturation > 92%  Continue other home medication , hold carvedilol  Cardiology consult  Repeat Echo  Strict I&O and daily wts  Cbc, CMp, Mg and Phos in am  On exam; +3 pitting edema to BLE  Wt Readings from Last 3 Encounters:   08/24/21 (!) 142 kg (312 lb 9 8 oz)   08/03/21 132 kg (290 lb)   08/03/21 132 kg (291 lb)        Date: 8/24   Day 2:   Cardiology cons; Acute exacerbation heart failure, suspect may be new systolic heart failure-echo pending  CAD S/p post PCI x5 to unknown vessels in 2012 and 2017 in [address] symptoms that are concerning for angina  Per pt, since starting Imdur however she subsequently has had accumulation of excess volume and presents today with acute exacerbation of heart failure  She has about 20-25 lb over her baseline weight  BLE pitting edema  Continue Iv Lasix  Check new Echo today to re-evaluate her LV systolic function and diastolic function  Restart beta-blocker, tele has been on the more tachycardic side since this was withheld on admit  Continue aspirin 81, Lipitor 40, amlodipine, Plavix and Imdur       ED Triage Vitals   Temperature Pulse Respirations Blood Pressure SpO2   08/23/21 2034 08/23/21 2024 08/23/21 2024 08/23/21 2024 08/23/21 2024   (!) 97 °F (36 1 °C) 73 (!) 24 135/53 94 %      Temp Source Heart Rate Source Patient Position - Orthostatic VS BP Location FiO2 (%)   08/23/21 2034 08/23/21 2024 08/23/21 2024 08/23/21 2024 --   Tympanic Monitor Sitting Left arm       Pain Score       08/23/21 2024       7          Wt Readings from Last 1 Encounters:   08/24/21 (!) 142 kg (313 lb 0 9 oz)     Additional Vital Signs:   08/24/21 0700  96 °F (35 6 °C)  Abnormal   74  18  168/65  92 %  None (Room air)  Lying   08/24/21 0002  97 °F (36 1 °C)  Abnormal   77  18  149/79  96 %  None (Room air)  Lying   08/23/21 2309  97 7 °F (36 5 °C)  69  18  152/68  94 %  None (Room air)  Sitting   08/23/21 2231  --  72  16  151/74  95 %  None (Room air)  Sitting   08/23/21 2134  --  71  16  124/55  96 %  None (Room air)  Sitting   08/23/21 2102  --  70  20  144/57  95 %  None (Room air)         Pertinent Labs/Diagnostic Test Results:   8/23  PCXR - Small left pleural effusion      EKG - NSR    Results from last 7 days   Lab Units 08/23/21  2046   SARS-COV-2  Negative     Results from last 7 days   Lab Units 08/24/21  0430 08/23/21  2046   WBC Thousand/uL 9 60 11 00   HEMOGLOBIN g/dL 8 5* 8 6*   HEMATOCRIT % 25 1* 25 7*   PLATELETS Thousands/uL 220 230   NEUTROS ABS Thousands/µL 6 80 8 40*         Results from last 7 days   Lab Units 08/24/21  0430 08/23/21  2046   SODIUM mmol/L 139 141   POTASSIUM mmol/L 4 5 4 9   CHLORIDE mmol/L 109* 111*   CO2 mmol/L 22 22   ANION GAP mmol/L 8 8   BUN mg/dL 57* 56*   CREATININE mg/dL 2 35* 2 37*   EGFR ml/min/1 73sq m 22* 22*   CALCIUM mg/dL 8 3* 8 3*   MAGNESIUM mg/dL 2 2 2 0   PHOSPHORUS mg/dL 5 1*  --      Results from last 7 days   Lab Units 08/24/21  0430 08/23/21  2046   AST U/L 35 24   ALT U/L 16 16   ALK PHOS U/L 100 98   TOTAL PROTEIN g/dL 6 2 6 4   ALBUMIN g/dL 3 5 3 6   TOTAL BILIRUBIN mg/dL 0 34 0 33     Results from last 7 days   Lab Units 08/24/21  1129 08/24/21  0545 08/24/21  0034   POC GLUCOSE mg/dl 244* 167* 155*     Results from last 7 days   Lab Units 08/24/21  0430 08/23/21  2046   GLUCOSE RANDOM mg/dL 155* 52*       Results from last 7 days   Lab Units 08/24/21  0430 08/24/21  0042 08/23/21 2046   TROPONIN I ng/mL <0 01 <0 01 <0 01         Results from last 7 days   Lab Units 08/23/21  2046   PROTIME seconds 13 6 INR  1 03   PTT seconds 33       Results from last 7 days   Lab Units 08/23/21 2046   NT-PRO BNP pg/mL 985*       Results from last 7 days   Lab Units 08/23/21 2051   CLARITY UA  Cloudy*   COLOR UA  Yellow   SPEC GRAV UA  1 020   PH UA  6 0   GLUCOSE UA mg/dl Negative   KETONES UA mg/dl Negative   BLOOD UA  250 0*   PROTEIN UA mg/dl >=500*   NITRITE UA  Positive*   BILIRUBIN UA  Negative   UROBILINOGEN UA mg/dL Negative   LEUKOCYTES UA  500 0*   WBC UA /hpf 30-50*   RBC UA /hpf 20-30*   BACTERIA UA /hpf Innumerable*   EPITHELIAL CELLS WET PREP /hpf Occasional   MUCUS THREADS  Occasional*     Results from last 7 days   Lab Units 08/23/21 2046   INFLUENZA A PCR  Negative   INFLUENZA B PCR  Negative   RSV PCR  Negative     ED Treatment:   Medication Administration from 08/23/2021 2022 to 08/23/2021 2356       Date/Time Order Dose Route Action     08/23/2021 2055 furosemide (LASIX) injection 40 mg 40 mg Intravenous Given     08/23/2021 2230 cefTRIAXone (ROCEPHIN) IVPB (premix in dextrose) 1,000 mg 50 mL 1,000 mg Intravenous New Bag        Past Medical History:   Diagnosis Date    Abnormal liver function     Anemia     Anxiety     Arthritis     Chronic kidney disease     stage 3    Chronic narcotic dependence (HCC)     Chronic pain disorder     lower back, hands , neck and knees    Coronary artery disease     Depression     Diabetes mellitus (HCC)     GERD (gastroesophageal reflux disease)     no meds at present    Heart murmur     murmur    Hyperlipidemia     Hypertension     Neurogenic bladder     Open toe wound 12/2020    right big toe open calus but is dry at present    Renal disorder     Shortness of breath     exertion    Sleep apnea     doesn't use cpap    Suprapubic catheter ([facility] [address] )      Present on Admission:   CAD (coronary artery disease)   HTN (hypertension)   (Resolved) Anemia due to stage 4 chronic kidney disease (HCC)   Normochromic normocytic anemia   New onset of congestive heart failure (HCC)   Hypocalcemia   Prolonged QT interval      Admitting Diagnosis: Shortness of breath [R06 02]  CHF (congestive heart failure) ([facility]) [I50 9]  UTI (urinary tract infection) [N39 0]  Essential hypertension [I10]  Dyspnea on exertion [R06 00]  Chronic kidney disease [N18 9]  CKD (chronic kidney disease) stage 4, GFR 15-29 ml/min ([facility]) [N18 4]  Acute exacerbation of CHF (congestive heart failure) ([facility]) [I50 9]  Coronary artery disease involving native heart without angina pectoris, unspecified vessel or lesion type [I25 10]  High output congestive heart failure (HCC) [I50 83]  Age/Sex: 64 y o  female     Admission Orders:  Scheduled Medications:  amLODIPine, 5 mg, Oral, Daily  aspirin, 81 mg, Oral, Daily  atorvastatin, 40 mg, Oral, After Dinner  carvedilol, 25 mg, Oral, BID With Meals  cefTRIAXone, 1,000 mg, Intravenous, Q24H  clopidogrel, 75 mg, Oral, Daily  docusate sodium, 100 mg, Oral, BID  furosemide, 40 mg, Intravenous, BID (diuretic)  heparin (porcine), 5,000 Units, Subcutaneous, Q8H LIAM  insulin glargine, 26 Units, Subcutaneous, Q12H LIAM  insulin lispro, 2-12 Units, Subcutaneous, TID AC  isosorbide mononitrate, 30 mg, Oral, Daily  levothyroxine, 50 mcg, Oral, Daily      Continuous IV Infusions: None     PRN Meds:  HYDROcodone-acetaminophen, 1 tablet, Oral, BID PRN 8/24 x1  nitroglycerin, 0 4 mg, Sublingual, Q5 Min PRN      Echo  Urine culture  Tele monitoring  IP CONSULT TO CARDIOLOGY    Network Utilization Review Department  ATTENTION: Please call with any questions or concerns to [phone] and carefully listen to the prompts so that you are directed to the right person  All voicemails are confidential   Self Fetch all requests for admission clinical reviews, approved or denied determinations and any other requests to dedicated fax number below belonging to the campus where the patient is receiving treatment   List of dedicated fax numbers for the Facilities:  [facility] ADMISSION DENIALS (Administrative/Medical Necessity) [phone]   [address] (Maternity/NICU/Pediatrics) [address] [facility] [facility] [address]in Limael [place] [id number] [place] [address] [address] Godinez [address] [facility] [address] [facility] [phone]

## 2021-08-25 LAB
ALBUMIN SERPL BCP-MCNC: 3.4 G/DL (ref 3–5.2)
ALP SERPL-CCNC: 99 U/L (ref 43–122)
ALT SERPL W P-5'-P-CCNC: 14 U/L
ANION GAP SERPL CALCULATED.3IONS-SCNC: 6 MMOL/L (ref 5–14)
AST SERPL W P-5'-P-CCNC: 21 U/L (ref 14–36)
ATRIAL RATE: 73 BPM
BASOPHILS # BLD AUTO: 0.1 THOUSANDS/ΜL (ref 0–0.1)
BASOPHILS NFR BLD AUTO: 1 % (ref 0–1)
BILIRUB SERPL-MCNC: 0.39 MG/DL
BUN SERPL-MCNC: 57 MG/DL (ref 5–25)
CALCIUM ALBUM COR SERPL-MCNC: 9.4 MG/DL (ref 8.3–10.1)
CALCIUM SERPL-MCNC: 8.9 MG/DL (ref 8.4–10.2)
CHLORIDE SERPL-SCNC: 109 MMOL/L (ref 97–108)
CO2 SERPL-SCNC: 26 MMOL/L (ref 22–30)
CREAT SERPL-MCNC: 2.41 MG/DL (ref 0.6–1.2)
EOSINOPHIL # BLD AUTO: 0.3 THOUSAND/ΜL (ref 0–0.4)
EOSINOPHIL NFR BLD AUTO: 4 % (ref 0–6)
ERYTHROCYTE [DISTWIDTH] IN BLOOD BY AUTOMATED COUNT: 16.9 %
GFR SERPL CREATININE-BSD FRML MDRD: 21 ML/MIN/1.73SQ M
GLUCOSE SERPL-MCNC: 149 MG/DL (ref 70–99)
GLUCOSE SERPL-MCNC: 151 MG/DL (ref 65–140)
GLUCOSE SERPL-MCNC: 204 MG/DL (ref 65–140)
GLUCOSE SERPL-MCNC: 240 MG/DL (ref 65–140)
GLUCOSE SERPL-MCNC: 260 MG/DL (ref 65–140)
HCT VFR BLD AUTO: 26 % (ref 36–46)
HGB BLD-MCNC: 8.7 G/DL (ref 12–16)
LYMPHOCYTES # BLD AUTO: 1.6 THOUSANDS/ΜL (ref 0.5–4)
LYMPHOCYTES NFR BLD AUTO: 19 % (ref 25–45)
MCH RBC QN AUTO: 32.4 PG (ref 26–34)
MCHC RBC AUTO-ENTMCNC: 33.6 G/DL (ref 31–36)
MCV RBC AUTO: 96 FL (ref 80–100)
MONOCYTES # BLD AUTO: 0.8 THOUSAND/ΜL (ref 0.2–0.9)
MONOCYTES NFR BLD AUTO: 10 % (ref 1–10)
NEUTROPHILS # BLD AUTO: 5.5 THOUSANDS/ΜL (ref 1.8–7.8)
NEUTS SEG NFR BLD AUTO: 66 % (ref 45–65)
P AXIS: 50 DEGREES
PLATELET # BLD AUTO: 213 THOUSANDS/UL (ref 150–450)
PMV BLD AUTO: 7.2 FL (ref 8.9–12.7)
POTASSIUM SERPL-SCNC: 4.8 MMOL/L (ref 3.6–5)
PR INTERVAL: 170 MS
PROT SERPL-MCNC: 6 G/DL (ref 5.9–8.4)
QRS AXIS: -4 DEGREES
QRSD INTERVAL: 148 MS
QT INTERVAL: 440 MS
QTC INTERVAL: 484 MS
RBC # BLD AUTO: 2.7 MILLION/UL (ref 4–5.2)
SODIUM SERPL-SCNC: 141 MMOL/L (ref 137–147)
T WAVE AXIS: 85 DEGREES
VENTRICULAR RATE: 73 BPM
WBC # BLD AUTO: 8.2 THOUSAND/UL (ref 4.5–11)

## 2021-08-25 PROCEDURE — 93005 ELECTROCARDIOGRAM TRACING: CPT

## 2021-08-25 PROCEDURE — 93010 ELECTROCARDIOGRAM REPORT: CPT | Performed by: INTERNAL MEDICINE

## 2021-08-25 PROCEDURE — 99232 SBSQ HOSP IP/OBS MODERATE 35: CPT | Performed by: INTERNAL MEDICINE

## 2021-08-25 PROCEDURE — 80053 COMPREHEN METABOLIC PANEL: CPT | Performed by: FAMILY MEDICINE

## 2021-08-25 PROCEDURE — 85025 COMPLETE CBC W/AUTO DIFF WBC: CPT | Performed by: FAMILY MEDICINE

## 2021-08-25 PROCEDURE — 99223 1ST HOSP IP/OBS HIGH 75: CPT | Performed by: INTERNAL MEDICINE

## 2021-08-25 PROCEDURE — 99232 SBSQ HOSP IP/OBS MODERATE 35: CPT | Performed by: FAMILY MEDICINE

## 2021-08-25 PROCEDURE — 82948 REAGENT STRIP/BLOOD GLUCOSE: CPT

## 2021-08-25 RX ORDER — MELATONIN
2000 DAILY
Status: DISCONTINUED | OUTPATIENT
Start: 2021-08-25 | End: 2021-08-29 | Stop reason: HOSPADM

## 2021-08-25 RX ORDER — AMLODIPINE BESYLATE 5 MG/1
5 TABLET ORAL DAILY
Status: DISCONTINUED | OUTPATIENT
Start: 2021-08-26 | End: 2021-08-29 | Stop reason: HOSPADM

## 2021-08-25 RX ORDER — FERROUS SULFATE 325(65) MG
325 TABLET ORAL EVERY OTHER DAY
Status: DISCONTINUED | OUTPATIENT
Start: 2021-08-26 | End: 2021-08-29 | Stop reason: HOSPADM

## 2021-08-25 RX ORDER — ONDANSETRON 4 MG/1
4 TABLET, ORALLY DISINTEGRATING ORAL EVERY 6 HOURS PRN
Status: DISCONTINUED | OUTPATIENT
Start: 2021-08-25 | End: 2021-08-29 | Stop reason: HOSPADM

## 2021-08-25 RX ORDER — FERROUS SULFATE 325(65) MG
325 TABLET ORAL
Status: DISCONTINUED | OUTPATIENT
Start: 2021-08-25 | End: 2021-08-25

## 2021-08-25 RX ADMIN — LEVOTHYROXINE SODIUM 50 MCG: 50 TABLET ORAL at 06:08

## 2021-08-25 RX ADMIN — CARVEDILOL 25 MG: 12.5 TABLET, FILM COATED ORAL at 16:07

## 2021-08-25 RX ADMIN — ASPIRIN 81 MG: 81 TABLET, COATED ORAL at 08:10

## 2021-08-25 RX ADMIN — HYDROCODONE BITARTRATE AND ACETAMINOPHEN 1 TABLET: 5; 325 TABLET ORAL at 08:20

## 2021-08-25 RX ADMIN — DOCUSATE SODIUM 100 MG: 100 CAPSULE, LIQUID FILLED ORAL at 08:10

## 2021-08-25 RX ADMIN — ATORVASTATIN CALCIUM 40 MG: 40 TABLET, FILM COATED ORAL at 17:45

## 2021-08-25 RX ADMIN — FERROUS SULFATE TAB 325 MG (65 MG ELEMENTAL FE) 325 MG: 325 (65 FE) TAB at 08:10

## 2021-08-25 RX ADMIN — INSULIN LISPRO 4 UNITS: 100 INJECTION, SOLUTION INTRAVENOUS; SUBCUTANEOUS at 13:05

## 2021-08-25 RX ADMIN — Medication 2000 UNITS: at 08:10

## 2021-08-25 RX ADMIN — ONDANSETRON 4 MG: 4 TABLET, ORALLY DISINTEGRATING ORAL at 11:10

## 2021-08-25 RX ADMIN — INSULIN GLARGINE 30 UNITS: 100 INJECTION, SOLUTION SUBCUTANEOUS at 08:09

## 2021-08-25 RX ADMIN — CLOPIDOGREL BISULFATE 75 MG: 75 TABLET ORAL at 08:10

## 2021-08-25 RX ADMIN — INSULIN LISPRO 4 UNITS: 100 INJECTION, SOLUTION INTRAVENOUS; SUBCUTANEOUS at 17:46

## 2021-08-25 RX ADMIN — HEPARIN SODIUM 5000 UNITS: 5000 INJECTION INTRAVENOUS; SUBCUTANEOUS at 21:29

## 2021-08-25 RX ADMIN — FUROSEMIDE 40 MG: 10 INJECTION, SOLUTION INTRAVENOUS at 08:09

## 2021-08-25 RX ADMIN — INSULIN LISPRO 2 UNITS: 100 INJECTION, SOLUTION INTRAVENOUS; SUBCUTANEOUS at 06:08

## 2021-08-25 RX ADMIN — INSULIN GLARGINE 30 UNITS: 100 INJECTION, SOLUTION SUBCUTANEOUS at 21:30

## 2021-08-25 RX ADMIN — CARVEDILOL 25 MG: 12.5 TABLET, FILM COATED ORAL at 08:09

## 2021-08-25 RX ADMIN — HEPARIN SODIUM 5000 UNITS: 5000 INJECTION INTRAVENOUS; SUBCUTANEOUS at 06:08

## 2021-08-25 RX ADMIN — DOCUSATE SODIUM 100 MG: 100 CAPSULE, LIQUID FILLED ORAL at 17:46

## 2021-08-25 RX ADMIN — AMLODIPINE BESYLATE 5 MG: 5 TABLET ORAL at 08:09

## 2021-08-25 RX ADMIN — ISOSORBIDE MONONITRATE 30 MG: 30 TABLET, EXTENDED RELEASE ORAL at 08:09

## 2021-08-25 RX ADMIN — HEPARIN SODIUM 5000 UNITS: 5000 INJECTION INTRAVENOUS; SUBCUTANEOUS at 13:07

## 2021-08-25 RX ADMIN — FUROSEMIDE 40 MG: 10 INJECTION, SOLUTION INTRAVENOUS at 16:07

## 2021-08-25 RX ADMIN — CEFTRIAXONE 1000 MG: 1 INJECTION, SOLUTION INTRAVENOUS at 21:29

## 2021-08-25 NOTE — PLAN OF CARE
Problem: Potential for Falls  Goal: Patient will remain free of falls  Description: INTERVENTIONS:  - Educate patient/family on patient safety including physical limitations  - Instruct patient to call for assistance with activity   - Consult OT/PT to assist with strengthening/mobility   - Keep Call bell within reach  - Keep bed low and locked with side rails adjusted as appropriate  - Keep care items and personal belongings within reach  - Initiate and maintain comfort rounds  - Make Fall Risk Sign visible to staff  - Apply yellow socks and bracelet for high fall risk patients  - Consider moving patient to room near nurses station  Outcome: Progressing     Problem: MOBILITY - ADULT  Goal: Maintain or return to baseline ADL function  Description: INTERVENTIONS:  -  Assess patient's ability to carry out ADLs; assess patient's baseline for ADL function and identify physical deficits which impact ability to perform ADLs (bathing, care of mouth/teeth, toileting, grooming, dressing, etc )  - Assess/evaluate cause of self-care deficits   - Assess range of motion  - Assess patient's mobility; develop plan if impaired  - Assess patient's need for assistive devices and provide as appropriate  - Encourage maximum independence but intervene and supervise when necessary  - Involve family in performance of ADLs  - Assess for home care needs following discharge   - Consider OT consult to assist with ADL evaluation and planning for discharge  - Provide patient education as appropriate  Outcome: Progressing  Goal: Maintains/Returns to pre admission functional level  Description: INTERVENTIONS:  - Perform BMAT or MOVE assessment daily    - Set and communicate daily mobility goal to care team and patient/family/caregiver     - Collaborate with rehabilitation services on mobility goals if consulted  - Out of bed for toileting  - Record patient progress and toleration of activity level   Outcome: Progressing     Problem: PAIN - ADULT  Goal: Verbalizes/displays adequate comfort level or baseline comfort level  Description: Interventions:  - Encourage patient to monitor pain and request assistance  - Assess pain using appropriate pain scale  - Administer analgesics based on type and severity of pain and evaluate response  - Implement non-pharmacological measures as appropriate and evaluate response  - Consider cultural and social influences on pain and pain management  - Notify physician/advanced practitioner if interventions unsuccessful or patient reports new pain  Outcome: Progressing     Problem: INFECTION - ADULT  Goal: Absence or prevention of progression during hospitalization  Description: INTERVENTIONS:  - Assess and monitor for signs and symptoms of infection  - Monitor lab/diagnostic results  - Monitor all insertion sites, i e  indwelling lines, tubes, and drains  - Monitor endotracheal if appropriate and nasal secretions for changes in amount and color  - [place] appropriate cooling/warming therapies per order  - Administer medications as ordered  - Instruct and encourage patient and family to use good hand hygiene technique  - Identify and instruct in appropriate isolation precautions for identified infection/condition  Outcome: Progressing  Goal: Absence of fever/infection during neutropenic period  Description: INTERVENTIONS:  - Monitor WBC    Outcome: Progressing     Problem: SAFETY ADULT  Goal: Patient will remain free of falls  Description: INTERVENTIONS:  - Educate patient/family on patient safety including physical limitations  - Instruct patient to call for assistance with activity   - Consult OT/PT to assist with strengthening/mobility   - Keep Call bell within reach  - Keep bed low and locked with side rails adjusted as appropriate  - Keep care items and personal belongings within reach  - Initiate and maintain comfort rounds  - Make Fall Risk Sign visible to staff  - Apply yellow socks and bracelet for high fall risk patients  - Consider moving patient to room near nurses station  Outcome: Progressing  Goal: Maintain or return to baseline ADL function  Description: INTERVENTIONS:  -  Assess patient's ability to carry out ADLs; assess patient's baseline for ADL function and identify physical deficits which impact ability to perform ADLs (bathing, care of mouth/teeth, toileting, grooming, dressing, etc )  - Assess/evaluate cause of self-care deficits   - Assess range of motion  - Assess patient's mobility; develop plan if impaired  - Assess patient's need for assistive devices and provide as appropriate  - Encourage maximum independence but intervene and supervise when necessary  - Involve family in performance of ADLs  - Assess for home care needs following discharge   - Consider OT consult to assist with ADL evaluation and planning for discharge  - Provide patient education as appropriate  Outcome: Progressing  Goal: Maintains/Returns to pre admission functional level  Description: INTERVENTIONS:  - Perform BMAT or MOVE assessment daily    - Set and communicate daily mobility goal to care team and patient/family/caregiver     - Collaborate with rehabilitation services on mobility goals if consulted  - Out of bed for toileting  - Record patient progress and toleration of activity level   Outcome: Progressing     Problem: DISCHARGE PLANNING  Goal: Discharge to home or other facility with appropriate resources  Description: INTERVENTIONS:  - Identify barriers to discharge w/patient and caregiver  - Arrange for needed discharge resources and transportation as appropriate  - Identify discharge learning needs (meds, wound care, etc )  - Arrange for interpretive services to assist at discharge as needed  - Refer to Case Management Department for coordinating discharge planning if the patient needs post-hospital services based on physician/advanced practitioner order or complex needs related to functional status, cognitive ability, or social support system  Outcome: Progressing     Problem: Knowledge Deficit  Goal: Patient/family/caregiver demonstrates understanding of disease process, treatment plan, medications, and discharge instructions  Description: Complete learning assessment and assess knowledge base    Interventions:  - Provide teaching at level of understanding  - Provide teaching via preferred learning methods  Outcome: Progressing     Problem: CARDIOVASCULAR - ADULT  Goal: Maintains optimal cardiac output and hemodynamic stability  Description: INTERVENTIONS:  - Monitor I/O, vital signs and rhythm  - Monitor for S/S and trends of decreased cardiac output  - Administer and titrate ordered vasoactive medications to optimize hemodynamic stability  - Assess quality of pulses, skin color and temperature  - Assess for signs of decreased coronary artery perfusion  - Instruct patient to report change in severity of symptoms  Outcome: Progressing  Goal: Absence of cardiac dysrhythmias or at baseline rhythm  Description: INTERVENTIONS:  - Continuous cardiac monitoring, vital signs, obtain 12 lead EKG if ordered  - Administer antiarrhythmic and heart rate control medications as ordered  - Monitor electrolytes and administer replacement therapy as ordered  Outcome: Progressing     Problem: GENITOURINARY - ADULT  Goal: Urinary catheter remains patent  Description: INTERVENTIONS:  - Assess patency of urinary catheter  - If patient has a chronic morales, consider changing catheter if non-functioning  - Follow guidelines for intermittent irrigation of non-functioning urinary catheter  Outcome: Progressing     Problem: Prexisting or High Potential for Compromised Skin Integrity  Goal: Skin integrity is maintained or improved  Description: INTERVENTIONS:  - Identify patients at risk for skin breakdown  - Assess and monitor skin integrity  - Assess and monitor nutrition and hydration status  - Monitor labs   - Assess for incontinence   - Turn and reposition patient  - Assist with mobility/ambulation  - Relieve pressure over bony prominences  - Avoid friction and shearing  - Provide appropriate hygiene as needed including keeping skin clean and dry  - Evaluate need for skin moisturizer/barrier cream  - Collaborate with interdisciplinary team   - Patient/family teaching  - Consider wound care consult   Outcome: Progressing     Problem: Nutrition/Hydration-ADULT  Goal: Nutrient/Hydration intake appropriate for improving, restoring or maintaining nutritional needs  Description: Monitor and assess patient's nutrition/hydration status for malnutrition  Collaborate with interdisciplinary team and initiate plan and interventions as ordered  Monitor patient's weight and dietary intake as ordered or per policy  Utilize nutrition screening tool and intervene as necessary  Determine patient's food preferences and provide high-protein, high-caloric foods as appropriate       INTERVENTIONS:  - Monitor oral intake, urinary output, labs, and treatment plans  - Assess nutrition and hydration status and recommend course of action  - Evaluate amount of meals eaten  - Assist patient with eating if necessary   - Allow adequate time for meals  - Recommend/ encourage appropriate diets, oral nutritional supplements, and vitamin/mineral supplements  - Order, calculate, and assess calorie counts as needed  - Recommend, monitor, and adjust tube feedings and TPN/PPN based on assessed needs  - Assess need for intravenous fluids  - Provide specific nutrition/hydration education as appropriate  - Include patient/family/caregiver in decisions related to nutrition  Outcome: Progressing

## 2021-08-25 NOTE — PROGRESS NOTES
Progress Note    Rosa Scott 64 y o  female MRN: [id number]  Unit/Bed#: 7T [facility] 708-01 Encounter: [id number]  Admitting Physician: Leopoldo Hans, MD  PCP: CHERELLE Lanza  Date of Admission:  8/23/2021  8:23 PM    Assessment and Plan    * New onset of congestive heart failure ([address] )  Assessment & Plan  Wt Readings from Last 3 Encounters:   08/25/21 (!) 141 kg (311 lb 15 2 oz)   08/03/21 132 kg (290 lb)   08/03/21 132 kg (291 lb)     Likely secondary to UTI vs excessive fluid intake vs discontinuation of diuretic therapy due to worsening kidney function   Dry weight:  132 kg  Echo this admission :Ejection fraction was estimated to be 45 %  There were regional wall motion abnormalities  Home meds:  Carvedilol 25 mg b i d , amlodipine 5 mg daily, isosorbide mononitrate 30 mg daily    TSH (August 2021) normal Troponin:  Negative x3  EKG:  NSR, LBBB CXR:  Unremarkable  NYHA Functional Class:  III        Plan to:   - Continue Telemetry  - Supplemental O2 to maintain saturation > 92%  - Continue Lasix 40 mg IV BID  Per cardiology recommendation   - Fluid restriction @ 1200 cc/day and low salt diet  - Continue other home medications  - Will likely plan for a catherization to be done this admission once patient gets close to her baseline dry weight  - Strict I&O and Daily Weights   - CBC, CMP, Mg, Phos in am        CAD (coronary artery disease)  Assessment & Plan  Status post PCI x5 with stents placed in 2012 and 2017 [facility])  History of abnormal EKG reported by patient in the past  (no baseline EKG seen on file)     Troponin negative x 2  EKG:  NSR, LBBB    Plan  -GTN 0 4 mg sublingual p r n ilure  - continue amlodipine 5 mg daily, isosorbide mononitrate 30 mg daily, clopidogrel 75 mg daily, aspirin 81 mg daily, Metoprolol 25 mg daily   -cardiology recommend heart cath after diureses is complete    CKD (chronic kidney disease) stage 4, GFR 15-29 ml/min [facility])  Assessment & Plan  Lab Results   Component Value Date    EGFR 21 (L) 08/25/2021    EGFR 22 (L) 08/24/2021    EGFR 22 (L) 08/23/2021    CREATININE 2 41 (H) 08/25/2021    CREATININE 2 35 (H) 08/24/2021    CREATININE 2 37 (H) 08/23/2021     Baseline creatinine 2 4   Follows with Urology as outpatient    Will avoid all nephrotoxic medications and procedures as possible  Will continue diuresis given CHF exacerbation with Lasix IV 40 mg BID   Monitor urine output closely  Type 2 diabetes mellitus with diabetic polyneuropathy, with long-term current use of insulin ([facility])  Assessment & Plan  Lab Results   Component Value Date    HGBA1C 7 1 (H) 08/03/2021       No results for input(s): POCGLU in the last 72 hours  Blood Sugar Average: Last 72 hrs:     POA glucose 57  On admission  Home regiment include  Lantus 50 units b i d , Humalog 10 units t i  d  Recently more compliant with home medication explaining the rapid  change in her A1C levels  Thought surrounding that her insulin requirements might over estimate her current needs while patient is complaint  Short acting was removed from her inpatient regiment and  started on glargine 26 BID  With the increase in her glucose 244, changes was made to increase her glargine to 30 units BID     Plan     - Continue Glargine 30 units BID   - Continue SSI   - Insulin Sliding Scale @ weight based Algorithm 3 with accuchecks ACHS  - Diabetic Diet with card restrcition level 3      Prolonged QT interval  Assessment & Plan  Mild at 486  Will continue to monitor and replete electrolytes as necessary  Will continue to monitor on telemetry  Hypocalcemia  Assessment & Plan  Lab Results   Component Value Date    CALCIUM 8 9 08/25/2021    PHOS 5 1 (H) 08/24/2021     Corrected to 8 6 with albumin levels of 3 6  Ionized calcium level came back at 1 09  1 g of calcium gluconate was given  Morning draw calcium resulted 8 9  PTH levels elevated 107 and Vit D at 23 6   This indicates hypocalcemia likely  In the setting of secondary hypoparathyroidism as a result of chronic kidney disease      Plan  Will  Start Vitamin D3 2000 units daily   -will continue to monitor creatinine  -will continue to follow on morning CMP       Neurogenic bladder  Assessment & Plan  Suspected secondary to diabetes  Has suprapubic catheter placed in June 2021 changed Qmonthly  Last changed 08/03/2021 by [facility] services  Catheter site clean with no dressing  Urinary tract infection associated with cystostomy catheter [facility])  Assessment & Plan  Urinalysis positive for blood, nitrates, white blood cells and bacteria  Plan     Will follow-up urine culture- negative to date   Continue ceftriaxone 1 g IV daily  Urology recommends maintain ABX until morales can be replaced which will occur at the [facility]     HTN (hypertension)  Assessment & Plan  Blood Pressure: 149/79  currently not at goal ([facility] 8:  BP less than 130/80)    Plan     Will continue amlodipine 5 mg daily and IV Lasix 40 mg daily  Low-salt diet  Monitor blood pressure t i d     Normochromic normocytic anemia  Assessment & Plan  Normocytic normochromic  Likely secondary to CKD  However no prior colonoscopy done  Labs were significant for low iron levels at 26 and TIBC at 234  Plan   Will continue to follow CBC   Ferrous sulfate 325mg every other day   Follow up on FOBT   Low threshold for blood transfusion with hemoglobin less than 8  Will recommend colonoscopy to be done outpatient       VTE Pharmacologic Prophylaxis:   Pharmacologic: Heparin  Mechanical VTE Prophylaxis in Place: Yes    Patient Centered Rounds: I have performed bedside rounds with nursing staff today  Discussions with Specialists or Other Care Team Provider:  Cardiologist    Education and Discussions with Family / Patient:  Discussed with patient regarding treatment plan  Patient understands that she has to be diuresed in the next couple days and will be headed to cath lab during this admission    In addition, abnormal findings were explained to the patient  Patient agrees and understands the plan at this time  Time Spent for Care: 30 minutes  More than 50% of total time spent on counseling and coordination of care as described above  Current Length of Stay: 2 day(s)    Current Patient Status: Inpatient   Certification Statement: The patient will continue to require additional inpatient hospital stay due to Continued diurese this patient  In addition, patient will be transferred to [facility] for cath lab procedure to be done  Discharge Plan:  Patient will be transferred to [facility] for cath lab  Code Status: Level 1 - Full Code      Subjective:   Patient reports to be feeling well at this time  Patient reports to be feeling better than she was feeling yesterday  Patient was also informed of abnormal lab findings which include hyperparathyroidism, iron deficiency and hypocalcemia  Patient continues to agree and understand day teams plan     Objective:     Vitals:   Temp (24hrs), Av 4 °F (36 3 °C), Min:96 5 °F (35 8 °C), Max:98 4 °F (36 9 °C)    Temp:  [96 5 °F (35 8 °C)-98 4 °F (36 9 °C)] 96 5 °F (35 8 °C)  HR:  [69-74] 69  Resp:  [18] 18  BP: (118-178)/(64-77) 178/77  SpO2:  [92 %-98 %] 98 %  Body mass index is 47 43 kg/m²  Input and Output Summary (last 24 hours): Intake/Output Summary (Last 24 hours) at 2021 1211  Last data filed at 2021 0900  Gross per 24 hour   Intake 840 ml   Output 2675 ml   Net -1835 ml       Physical Exam:     Physical Exam  Vitals and nursing note reviewed  Constitutional:       General: She is not in acute distress  Appearance: Normal appearance  She is obese  She is not ill-appearing, toxic-appearing or diaphoretic  HENT:      Head: Normocephalic  Nose: Nose normal  No congestion or rhinorrhea  Mouth/Throat:      Mouth: Mucous membranes are moist       Pharynx: Oropharynx is clear   No oropharyngeal exudate or posterior oropharyngeal erythema  Eyes:      General: No scleral icterus  Right eye: No discharge  Left eye: No discharge  Conjunctiva/sclera: Conjunctivae normal    Cardiovascular:      Rate and Rhythm: Normal rate and regular rhythm  Pulses: Normal pulses  Heart sounds: Normal heart sounds  No murmur heard  No friction rub  No gallop  Pulmonary:      Effort: Pulmonary effort is normal  No respiratory distress  Breath sounds: Normal breath sounds  No stridor  No wheezing, rhonchi or rales  Chest:      Chest wall: No tenderness  Abdominal:      General: Bowel sounds are normal  There is no distension  Palpations: Abdomen is soft  There is no mass  Tenderness: There is no abdominal tenderness  There is no guarding or rebound  Hernia: A hernia (Umbilical hernia noted) is present  Musculoskeletal:         General: No tenderness, deformity or signs of injury  Right lower leg: No tenderness  3+ Pitting Edema present  Left lower leg: No tenderness  3+ Pitting Edema present  Legs:       Comments: Bilateral pitting edema to the level of the knee  Erythematous patch of skin noted on right shin  Feet:      Right foot:      Skin integrity: No ulcer  Left foot:      Skin integrity: No ulcer  Skin:     General: Skin is warm  Coloration: Skin is not jaundiced or pale  Findings: No bruising or lesion  Neurological:      General: No focal deficit present  Mental Status: She is alert     Psychiatric:         Mood and Affect: Mood normal          Behavior: Behavior normal            Additional Data:     Labs:    Results from last 7 days   Lab Units 08/25/21  0507   WBC Thousand/uL 8 20   HEMOGLOBIN g/dL 8 7*   HEMATOCRIT % 26 0*   PLATELETS Thousands/uL 213   NEUTROS PCT % 66*   LYMPHS PCT % 19*   MONOS PCT % 10   EOS PCT % 4     Results from last 7 days   Lab Units 08/25/21  0507   POTASSIUM mmol/L 4 8   CHLORIDE mmol/L 109*   CO2 mmol/L 26   BUN mg/dL 57*   CREATININE mg/dL 2 41*   CALCIUM mg/dL 8 9   ALK PHOS U/L 99   ALT U/L 14   AST U/L 21     Results from last 7 days   Lab Units 08/23/21  2046   INR  1 03     Results from last 7 days   Lab Units 08/25/21  1118 08/25/21  0538 08/24/21  1958 08/24/21  1558 08/24/21  1129 08/24/21  0545 08/24/21  0034   POC GLUCOSE mg/dl 204* 151* 212* 251* 244* 167* 155*             * I Have Reviewed All Lab Data Listed Above  * Additional Pertinent Lab Tests Reviewed: All Labs For Current Hospital Admission Reviewed    Imaging:      Imaging Personally Reviewed by Myself Includes:  ECHO results Systolic function was mildly reduced by visual assessment  Ejection fraction was estimated to be 45 %  There were regional wall motion abnormalities that suggest coronary artery disease  Recent Cultures (last 7 days): urine culture is pending at this time  Results from last 7 days   Lab Units 08/23/21 2051   URINE CULTURE  Culture results to follow         Last 24 Hours Medication List:   Current Facility-Administered Medications   Medication Dose Route Frequency Provider Last Rate    amLODIPine  5 mg Oral Daily Catarino London MD      aspirin  81 mg Oral Daily Catarino London MD      atorvastatin  40 mg Oral After 65 Meenu Phillip MD      carvedilol  25 mg Oral BID With Meals Payton Bernabe MD      cefTRIAXone  1,000 mg Intravenous Q24H Bre Resendez MD 1,000 mg (08/24/21 2210)    cholecalciferol  2,000 Units Oral Daily Jessica Jaime MD      clopidogrel  75 mg Oral Daily Catarino London MD      docusate sodium  100 mg Oral BID Catarino London MD      [START ON 8/26/2021] ferrous sulfate  325 mg Oral Every Other Day Payton Bernabe MD      furosemide  40 mg Intravenous BID (diuretic) Payton Bernabe MD      heparin (porcine)  5,000 Units Subcutaneous Q8H [facility] MD Roxana Henson HYDROcodone-acetaminophen  1 tablet Oral BID PRN Payton Ponce MD      insulin glargine  30 Units Subcutaneous Q12H [facility] Payton Fulton MD      insulin lispro  2-12 Units Subcutaneous TID  Catarino Phillip MD      isosorbide mononitrate  30 mg Oral Daily Catarino Hernandez MD      levothyroxine  50 mcg Oral Daily Catarino Hernandez MD      nitroglycerin  0 4 mg Sublingual Q5 Min PRN Catarino Hernandez MD      ondansetron  4 mg Oral Q6H PRN Payton Ponce MD          ** Please Note: Dictation voice to text software may have been used in the creation of this document   **    Parker Moreno MD  08/25/21  12:11 PM

## 2021-08-25 NOTE — PLAN OF CARE
Problem: PAIN - ADULT  Goal: Verbalizes/displays adequate comfort level or baseline comfort level  Description: Interventions:  - Encourage patient to monitor pain and request assistance  - Assess pain using appropriate pain scale  - Administer analgesics based on type and severity of pain and evaluate response  - Implement non-pharmacological measures as appropriate and evaluate response  - Consider cultural and social influences on pain and pain management  - Notify physician/advanced practitioner if interventions unsuccessful or patient reports new pain  Outcome: Progressing     Problem: INFECTION - ADULT  Goal: Absence or prevention of progression during hospitalization  Description: INTERVENTIONS:  - Assess and monitor for signs and symptoms of infection  - Monitor lab/diagnostic results  - Monitor all insertion sites, i e  indwelling lines, tubes, and drains  - Monitor endotracheal if appropriate and nasal secretions for changes in amount and color  - [place] appropriate cooling/warming therapies per order  - Administer medications as ordered  - Instruct and encourage patient and family to use good hand hygiene technique  - Identify and instruct in appropriate isolation precautions for identified infection/condition  Outcome: Progressing

## 2021-08-25 NOTE — PROGRESS NOTES
Progress Note - Cardiology   Jim Porras 64 y o  female MRN: [id number]  Unit/Bed#: 7T [facility] 708-01 Encounter: [id number]    Assessment:  1  Combined systolic and diastolic heart failure, acute on chronic  2  Mild mitral stenosis  3  Borderline elevated pulmonary artery pressures, estimated 34 mmHg systolic   4  Hypertension not well controlled  5  Acute on chronic kidney disease, stage IV  6  Hyperlipidemia  7  Neurogenic bladder, status post suprapubic catheter      Plan:  1  Recommend Nephrology consult with the severity of her chronic kidney disease  2  Continue present diuretics and defer on future diuretics to Nephrology  3  Consider transfer to [place] when euvolemic for cardiac catheterization as per Dr Jericho Pedro who is her outpatient cardiologist       Interval history:  Patient has no complaints of chest discomfort or shortness of breath today  She states that she is not feeling herself for quite right but cannot describe any specific abnormalities  Weight is down 1-2 lb      PROBLEM LIST:    Patient Active Problem List    Diagnosis Date Noted    New onset of congestive heart failure ([facility] ) 08/24/2021    Hypocalcemia 08/24/2021    Prolonged QT interval 08/24/2021    Urinary tract infection associated with cystostomy catheter ([facility] ) 08/23/2021    Neurogenic bladder 08/23/2021    ARF (acute renal failure) ([facility] ) 06/23/2021    Morbid obesity with BMI of 45 0-49 9, adult ([facility] ) 06/15/2021    History of heart artery stent 06/15/2021    CKD (chronic kidney disease) stage 4, GFR 15-29 ml/min ([facility] ) 06/04/2021    Memory impairment 05/26/2021    Chronic bronchitis ([facility] ) 05/25/2021    Severe episode of recurrent major depressive disorder, without psychotic features ([facility] ) 05/25/2021    Skin ulcer of hand, limited to breakdown of skin ([facility] ) 02/25/2021    HTN (hypertension) 12/21/2020    Diabetic ulcer of toe of right foot associated with type 2 diabetes mellitus, with muscle involvement without evidence of necrosis ([facility] ) 11/25/2020    Head lump 11/11/2020    Need for follow-up by  11/11/2020    Normochromic normocytic anemia 09/09/2020    CKD (chronic kidney disease) stage 3, GFR 30-59 ml/min ([facility]) 09/09/2020    Abnormal LFTs 09/09/2020    S/P cervical spinal fusion 09/09/2020    Major depressive disorder 09/02/2020    Type 2 diabetes mellitus with diabetic polyneuropathy, with long-term current use of insulin ([facility] ) 09/02/2020    Anxiety 09/02/2020    CAD (coronary artery disease) 09/02/2020    Osteoarthritis of left knee 09/02/2020    Healthcare maintenance 09/02/2020    Cellulitis of finger of right hand 08/26/2020       Vitals: BP (!) 178/77 (BP Location: Right arm)   Pulse 69   Temp (!) 96 5 °F (35 8 °C) (Temporal)   Resp 18   Ht 5' 8" (1 727 m)   Wt (!) 141 kg (311 lb 15 2 oz)   SpO2 98%   BMI 47 43 kg/m²   PREVIOUS WEIGHTS:   Wt Readings from Last 5 Encounters:   08/25/21 (!) 141 kg (311 lb 15 2 oz)   08/03/21 132 kg (290 lb)   08/03/21 132 kg (291 lb)   07/13/21 130 kg (286 lb 3 2 oz)   07/13/21 128 kg (283 lb 3 2 oz)        Labs/Data:        Results from last 7 days   Lab Units 08/25/21  0507 08/24/21  0430 08/23/21  2046   WBC Thousand/uL 8 20 9 60 11 00   HEMOGLOBIN g/dL 8 7* 8 5* 8 6*   HEMATOCRIT % 26 0* 25 1* 25 7*   MCV fL 96 96 97   MCH pg 32 4 32 4 32 2   MCHC g/dL 33 6 33 9 33 3   PLATELETS Thousands/uL 213 220 230     Results from last 7 days   Lab Units 08/25/21  0507 08/24/21  0430 08/23/21  2046   EGFR ml/min/1 73sq m 21* 22* 22*   SODIUM mmol/L 141 139 141   POTASSIUM mmol/L 4 8 4 5 4 9   CHLORIDE mmol/L 109* 109* 111*   CO2 mmol/L 26 22 22   BUN mg/dL 57* 57* 56*   CREATININE mg/dL 2 41* 2 35* 2 37*     Results from last 7 days   Lab Units 08/25/21  0507 08/24/21  0430 08/23/21  2046   CALCIUM mg/dL 8 9 8 3* 8 3*   AST U/L 21 35 24   ALT U/L 14 16 16   ALK PHOS U/L 99 100 98   MAGNESIUM mg/dL  --  2 2 2 0     Results from last 7 days   Lab Units 08/24/21  0430 08/24/21  0042 08/23/21 2046   TROPONIN I ng/mL <0 01 <0 01 <0 01     Lab Results   Component Value Date    NTBNP 985 (H) 08/23/2021     Lab Results   Component Value Date    CHOLESTEROL 152 08/03/2021    TRIG 180 (H) 08/03/2021    HDL 30 (L) 08/03/2021    LDLCALC 86 08/03/2021    HGBA1C 7 1 (H) 08/03/2021       Results from last 7 days   Lab Units 08/23/21 2046   INR  1 03   PROTIME seconds 13 6          Current Facility-Administered Medications:     amLODIPine (NORVASC) tablet 5 mg, 5 mg, Oral, Daily, Marta Phillip MD, 5 mg at 08/25/21 0809    aspirin (ECOTRIN LOW STRENGTH) EC tablet 81 mg, 81 mg, Oral, Daily, Marta Phillip MD, 81 mg at 08/25/21 0810    atorvastatin (LIPITOR) tablet 40 mg, 40 mg, Oral, After Dinner, Arun Chapman MD, 40 mg at 08/24/21 1801    carvedilol (COREG) tablet 25 mg, 25 mg, Oral, BID With Meals, Payton Fulton MD, 25 mg at 08/25/21 0809    cefTRIAXone (ROCEPHIN) IVPB (premix in dextrose) 1,000 mg 50 mL, 1,000 mg, Intravenous, Q24H, Marta Phillip MD, Last Rate: 100 mL/hr at 08/24/21 2210, 1,000 mg at 08/24/21 2210    cholecalciferol (VITAMIN D3) tablet 2,000 Units, 2,000 Units, Oral, Daily, Carter Taveras MD, 2,000 Units at 08/25/21 0810    clopidogrel (PLAVIX) tablet 75 mg, 75 mg, Oral, Daily, Marta Phillip MD, 75 mg at 08/25/21 0810    docusate sodium (COLACE) capsule 100 mg, 100 mg, Oral, BID, Marta Phillip MD, 100 mg at 08/25/21 0810    [START ON 8/26/2021] ferrous sulfate tablet 325 mg, 325 mg, Oral, Every Other Day, Payton Fulton MD    furosemide (LASIX) injection 40 mg, 40 mg, Intravenous, BID (diuretic), Payton Fulton MD, 40 mg at 08/25/21 0809    heparin (porcine) subcutaneous injection 5,000 Units, 5,000 Units, Subcutaneous, Q8H [facility], Arun Chapman MD, 5,000 Units at 08/25/21 0608    HYDROcodone-acetaminophen (NORCO) 5-325 mg per tablet 1 tablet, 1 tablet, Oral, BID PRN, Amando Fournier MD, 1 tablet at 08/25/21 0820    insulin glargine (LANTUS) subcutaneous injection 30 Units 0 3 mL, 30 Units, Subcutaneous, Q12H [facility], Payton Macedo MD, 30 Units at 08/25/21 0809    insulin lispro (HumaLOG) 100 units/mL subcutaneous injection 2-12 Units, 2-12 Units, Subcutaneous, TID AC, 2 Units at 08/25/21 0608 **AND** Fingerstick Glucose (POCT), , , 4x Daily AC and at bedtime, Catarino Alan MD    isosorbide mononitrate (IMDUR) 24 hr tablet 30 mg, 30 mg, Oral, Daily, Joanna Phillip MD, 30 mg at 08/25/21 0809    levothyroxine tablet 50 mcg, 50 mcg, Oral, Daily, Tracy Moeller MD, 50 mcg at 08/25/21 0608    nitroglycerin (NITROSTAT) SL tablet 0 4 mg, 0 4 mg, Sublingual, Q5 Min PRN, Catarino Alan MD  Allergies   Allergen Reactions    Codeine      Other reaction(s): Nausea and Vomiting    Latex Itching         Intake/Output Summary (Last 24 hours) at 8/25/2021 0856  Last data filed at 8/25/2021 0500  Gross per 24 hour   Intake 900 ml   Output 2675 ml   Net -1775 ml       Invasive Devices     Peripheral Intravenous Line            Peripheral IV 08/24/21 Left Antecubital <1 day          Drain            Suprapubic Catheter Non-latex 16 Fr  21 days                Review of Systems   Respiratory: Negative for cough, choking, chest tightness, shortness of breath and wheezing  Cardiovascular: Negative for chest pain, palpitations and leg swelling  Musculoskeletal: Negative for gait problem  Skin: Negative for rash  Neurological: Negative for dizziness, tremors, syncope, weakness, light-headedness, numbness and headaches  Psychiatric/Behavioral: Negative for agitation and behavioral problems  The patient is not hyperactive  Physical Exam  Constitutional:       General: She is not in acute distress  Appearance: She is well-developed  Comments: Morbidly obese, BMI 47 4   HENT:      Head: Normocephalic and atraumatic  Neck:      Thyroid: No thyromegaly  Vascular: No carotid bruit or JVD  Trachea: No tracheal deviation  Cardiovascular:      Rate and Rhythm: Normal rate and regular rhythm  Pulses: Normal pulses  Heart sounds: Normal heart sounds  No murmur heard  No friction rub  No gallop  Pulmonary:      Effort: Pulmonary effort is normal  No respiratory distress  Breath sounds: Normal breath sounds  No wheezing, rhonchi or rales  Chest:      Chest wall: No tenderness  Musculoskeletal:         General: Normal range of motion  Cervical back: Normal range of motion and neck supple  Right lower leg: Edema present  Comments: 2+ bilateral pretibial edema   Skin:     General: Skin is warm and dry  Neurological:      General: No focal deficit present  Mental Status: She is alert and oriented to person, place, and time  Gait: Gait normal    Psychiatric:         Mood and Affect: Mood normal          Behavior: Behavior normal          Thought Content: Thought content normal          Judgment: Judgment normal            --------------------------------------------------------------------------------  ECHO:   Results for orders placed during the hospital encounter of 21    Echo complete with contrast if indicated    [place] [address] [facility] [address]    Transthoracic Echocardiogram  2D, M-mode, Doppler, and Color Doppler    Study date:  24-Aug-2021    Patient: Gabrielle Caruso  MR number: [id number]  Account number: [de-identified]  : 1960  Age: 64 years  Gender: Female  Status: Inpatient  Location: [facility]  Height: 68 in  Weight: 312 4 lb  BP: 149/ 79 mmHg    Indications: Heart failure    Diagnoses: I50 9 - Heart failure, unspecified    Sonographer:  CASANDRA Ruiz  Interpreting Physician:  NICOLE Georges    Primary Physician:  Celina Camp MD  Referring Physician:  Renzo Tariq CRNP  Group:  [facility]    SUMMARY    LEFT VENTRICLE:  Systolic function was mildly reduced by visual assessment  Ejection fraction was estimated to be 45 %  There were regional wall motion abnormalities that suggest coronary artery disease  There was severe hypokinesis of the basal-mid anteroseptal and basal-mid inferoseptal wall(s)  There was moderate hypokinesis of the basal-mid inferior wall(s)  Wall thickness was mildly increased  There was mild concentric hypertrophy  Features were consistent with grade 2 diastolic dysfunction  Doppler parameters were consistent with high ventricular filling pressure  E/E' was > 19    VENTRICULAR SEPTUM:  There was dyssynergic motion  These changes are consistent with LBBB  MITRAL VALVE:  There was mild "progressive" mitral stenosis  Mean gradient of 5 mmHg at 73 bpm  MVA by Doppler continuity equation is 2 15 cm2  TRICUSPID VALVE:  There was mild regurgitation  PULMONIC VALVE:  There was trace regurgitation  PERICARDIUM:  A trace pericardial effusion was identified  HISTORY: PRIOR HISTORY: CAD, CKD Risk factors: hypertension and diabetes  PROCEDURE: The study was performed in the [place] [address]  This was a routine study  The transthoracic approach was used  The study included complete 2D imaging, M-mode, complete spectral Doppler, and color Doppler  The heart rate was 76  bpm, at the start of the study  Images were obtained from the parasternal, apical, subcostal, and suprasternal notch acoustic windows  Image quality was adequate  LEFT VENTRICLE: Size was normal  Systolic function was mildly reduced by visual assessment  Ejection fraction was estimated to be 45 %  There were regional wall motion abnormalities that suggest coronary artery disease  There was severe hypokinesis of the basal-mid anteroseptal and basal-mid inferoseptal wall(s)  There was moderate hypokinesis of the basal-mid inferior wall(s)   Wall thickness was mildly increased  There was mild concentric hypertrophy  DOPPLER: Features were consistent with grade 2 diastolic dysfunction  Doppler parameters were consistent with high ventricular filling pressure  E/E' was > 19    VENTRICULAR SEPTUM: There was dyssynergic motion  These changes are consistent with LBBB  RIGHT VENTRICLE: The size was normal  Systolic function was normal  Wall thickness was normal     LEFT ATRIUM: Size was within the limits of normal when indexed for body surface area  LA volume index 31 ml/m2  RIGHT ATRIUM: Size was normal     MITRAL VALVE: Valve structure was normal  There was mild thickening  There was normal leaflet separation  There was mildly restricted mobility of the anterior leaflet  DOPPLER: The transmitral velocity was within the normal range  There  was mild "progressive" mitral stenosis  Mean gradient of 5 mmHg at 73 bpm  MVA by Doppler continuity equation is 2 15 cm2  There was no regurgitation  AORTIC VALVE: The valve was trileaflet  Leaflets exhibited normal thickness and normal cuspal separation  DOPPLER: Transaortic velocity was within the normal range  There was no evidence for stenosis  There was no regurgitation  TRICUSPID VALVE: The valve structure was normal  There was normal leaflet separation  DOPPLER: The transtricuspid velocity was within the normal range  There was no evidence for stenosis  There was mild regurgitation  Estimated peak PA  pressure was 34 mmHg  PULMONIC VALVE: Not well visualized  DOPPLER: There was no evidence for stenosis  There was trace regurgitation  PERICARDIUM: A trace pericardial effusion was identified  The pericardium was normal in appearance  AORTA: The root exhibited normal size  SYSTEMIC VEINS: IVC: The inferior vena cava was normal in size and course   Respirophasic changes were normal     SYSTEM MEASUREMENT TABLES    2D  %FS: 25 15 %  Ao Diam: 2 77 cm  EDV(Teich): 136 76 ml  EF(Teich): 49 26 %  ESV(Teich): 69 39 ml  IVSd: 1 07 cm  LA Area: 30 26 cm2  LA Diam: 4 91 cm  LVEDV MOD A4C: 210 21 ml  LVEF MOD A4C: 46 53 %  LVESV MOD A4C: 112 41 ml  LVIDd: 5 32 cm  LVIDs: 3 99 cm  LVLd A4C: 9 67 cm  LVLs A4C: 8 21 cm  LVPWd: 1 1 cm  RA Area: 18 38 cm2  RVIDd: 3 28 cm  SV MOD A4C: 97 8 ml  SV(Teich): 67 38 ml    CW  TR Vmax: 2 78 m/s  TR maxP 99 mmHg    MM  TAPSE: 2 42 cm    PW  E' Sept: 0 06 m/s  E/E' Sept: 19 84  MV A Santosh: 1 49 m/s  MV Dec Cuming: 9 08 m/s2  MV DecT: 137 82 ms  MV E Santosh: 1 25 m/s  MV E/A Ratio: 0 84  MV PHT: 39 97 ms  MVA By PHT: 5 5 cm2    [facility]    Prepared and electronically signed by    Colletta Skipper, D O  Signed 24-Aug-2021 14:54:43      ======================================================     Imaging:   I have personally reviewed pertinent reports          EKG:  Normal sinus rhythm

## 2021-08-25 NOTE — CONSULTS
Consultation - Nephrology   Mariano Aleman 64 y o  female MRN: [id number]  Unit/Bed#: 7T [facility] 708-01 Encounter: [id number]    ASSESSMENT AND PLAN:  Patient is 53-year-old female with significant medical issues of diabetes for more than 20 years, hypertension for many years, progressive CKD stage 4, neurogenic bladder, status post suprapubic catheter placed, combined CHF, hyperlipidemia, CAD, status post PTCA, presented with shortness of breath, weight gain  We are consulted for CKD management  Progressive CKD stage 4, baseline creatinine 2 3 to 2 6 since May 2021, prior 1 6 to 1 7 since August 2020, follows with Dr Juan Deleon  -last creatinine 2 4 today relatively stable at recent baseline  -CKD suspect in the setting of uncontrolled diabetes for long-term, hypertension, in the setting of morbidly obese  -UA which is SPC specimen shows 20 to 30 RBCs/30 to 50 WBCs, innumerable bacteria, 3+ proteinuria  -avoid nephrotoxins or NSAIDs  -eventually patient needs cardiac catheterization  I had detailed discussion with patient regarding risk of ALFRED with contrast exposure and small risk of dialysis  She verbalized understanding of my discussion with her   -closely monitor renal function while aggressively diuresing   -renal ultrasound in May 2021 shows normal size kidneys, normal echogenicity, no hydronephrosis or stones  Hypertension  -outpatient regimen includes amlodipine 5 mg daily, Coreg 25 mg p o  B i d , Imdur 30 mg daily   -continue same regimen   -blood pressure somewhat fluctuating with occasional high readings  Continue to monitor with aggressive diuresis as also suspect volume mediated component contributing to elevated BP   -goal SBP 130s  -avoiding ACE-inhibitor or ARB for time being given progressive CKD and while aggressively diuresing with anticipated cardiac catheterization in the near future      Nephrotic range proteinuria  -last UPC ratio 5 7 g in August 2021   -suspect in the setting of prior uncontrolled diabetes for many years, 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 morbid obesity  -last hemoglobin A1c 7 1 although prior values have been anywhere from 8 to 14 going back to 2019  -avoiding Ace inhibitor or ARB for time being although this may need to be considered in the future as outpatient once renal function remains stable   -serum immunofixation shows no monoclonal gammopathy  UPEP negative    Combined CHF, echo shows EF 65%, grade 2 diastolic dysfunction, normal IVC  -currently diuresing well with Lasix 40 mg IV b i d  With negative balance  S daily standing weight, accurate intake and output   -goal to keep negative balance  -she has weight gain about 20 lb, recent weight 290 lb at office visit earlier in August 2021    -eventual plan for cardiac cath once more close to euvolemia  Anemia in CKD, hemoglobin below goal, transfuse p r n  For hemoglobin less than seven  Continue to closely monitor, iron saturation 20% with ferritin 44  Neurogenic bladder, status post suprapubic catheter placement  Discussed above plan in detail with primary team     HISTORY OF PRESENT ILLNESS:  Requesting Physician: Stefanie Scott MD  Reason for Consult:  CKD    Tamera Hdz is a 64y o  year old female who was admitted to [facility] [place] [facility] after presenting with shortness of breath, weight gain  A renal consultation is requested today for assistance in the management of CKD  Old medical records including prior creatinine values from [address] [place] [address] records were reviewed  Prior Nephrology notes were reviewed  Patient has overall progressive CKD with progression of baseline creatinine as mentioned above  She started having worsening shortness of breath, weight gain over last several weeks and presented to the hospital for further evaluation  She had about 20 lb weight gain  Also has worsening leg edema  She has chronic suprapubic catheter  She denies any nausea, vomiting    Since arrival she has been aggressively diuresed with IV Lasix and seems to be feeling better with her shortness of breath      PAST MEDICAL HISTORY:  Past Medical History:   Diagnosis Date    Abnormal liver function     Anemia     Anxiety     Arthritis     Chronic kidney disease     stage 3    Chronic narcotic dependence (HCC)     Chronic pain disorder     lower back, hands , neck and knees    Coronary artery disease     Depression     Diabetes mellitus ([address] )     GERD (gastroesophageal reflux disease)     no meds at present    Heart murmur     murmur    Hyperlipidemia     Hypertension     Neurogenic bladder     Open toe wound 2020    right big toe open calus but is dry at present    Renal disorder     Shortness of breath     exertion    Sleep apnea     doesn't use cpap    [address] [address] [address]        PAST SURGICAL HISTORY:  Past Surgical History:   Procedure Laterality Date    AMPUTATION      ANGIOPLASTY      5    BREAST EXCISIONAL BIOPSY Left     BREAST SURGERY      CARDIAC CATHETERIZATION      CARPAL TUNNEL RELEASE Bilateral     CERVICAL FUSION      HYSTERECTOMY  2008    IR SUPRAPUBIC CATHETER PLACEMENT  6/15/2021    KNEE SURGERY      OOPHORECTOMY      MD EXC SKIN BENIG 3 1-4 CM REMAINDR BODY N/A 2020    Procedure: EXCISION SEBACEOUS CYST X 5 SCALP;  Surgeon: Louise Santos MD;  Location:  MAIN OR;  Service: General    TOE AMPUTATION Left     TRIGGER FINGER RELEASE Right     4th Finger       ALLERGIES:  Allergies   Allergen Reactions    Codeine      Other reaction(s): Nausea and Vomiting    Latex Itching       SOCIAL HISTORY:  Social History     Substance and Sexual Activity   Alcohol Use Not Currently     Social History     Substance and Sexual Activity   Drug Use Never     Social History     Tobacco Use   Smoking Status Former Smoker    Packs/day: 1 00    Years: 35 00    Pack years: 35 00    Types: Cigarettes    Quit date:     Years since quittin 6   Smokeless Tobacco Never Used       FAMILY HISTORY:  Family History   Problem Relation Age of Onset    Stroke Father     Heart disease Father     No Known Problems Mother     No Known Problems Sister     No Known Problems Daughter     No Known Problems Maternal Grandmother     No Known Problems Maternal Grandfather     No Known Problems Paternal Grandmother     No Known Problems Paternal Grandfather     No Known Problems Maternal Aunt     No Known Problems Maternal Aunt     No Known Problems Maternal Aunt     No Known Problems Maternal Aunt     No Known Problems Maternal Aunt     No Known Problems Maternal Aunt     No Known Problems Paternal Aunt        MEDICATIONS:    Current Facility-Administered Medications:     amLODIPine (NORVASC) tablet 5 mg, 5 mg, Oral, Daily, June Phillip MD, 5 mg at 08/25/21 0809    aspirin (ECOTRIN LOW STRENGTH) EC tablet 81 mg, 81 mg, Oral, Daily, June Phillip MD, 81 mg at 08/25/21 0810    atorvastatin (LIPITOR) tablet 40 mg, 40 mg, Oral, After Dinner, Mary Fishman MD, 40 mg at 08/24/21 1801    carvedilol (COREG) tablet 25 mg, 25 mg, Oral, BID With Meals, Payton Fulton MD, 25 mg at 08/25/21 0809    cefTRIAXone (ROCEPHIN) IVPB (premix in dextrose) 1,000 mg 50 mL, 1,000 mg, Intravenous, Q24H, June Phillip MD, Last Rate: 100 mL/hr at 08/24/21 2210, 1,000 mg at 08/24/21 2210    cholecalciferol (VITAMIN D3) tablet 2,000 Units, 2,000 Units, Oral, Daily, Charlie De Jesus MD, 2,000 Units at 08/25/21 0810    clopidogrel (PLAVIX) tablet 75 mg, 75 mg, Oral, Daily, June Phillip MD, 75 mg at 08/25/21 0810    docusate sodium (COLACE) capsule 100 mg, 100 mg, Oral, BID, June Phillip MD, 100 mg at 08/25/21 0810    [START ON 8/26/2021] ferrous sulfate tablet 325 mg, 325 mg, Oral, Every Other Day, Payton Fulton MD    furosemide (LASIX) injection 40 mg, 40 mg, Intravenous, BID (diuretic), Eusebio Joseph MD, 40 mg at 08/25/21 0809    heparin (porcine) subcutaneous injection 5,000 Units, 5,000 Units, Subcutaneous, Q8H [address], Sony Plasencia MD, 5,000 Units at 08/25/21 1307    HYDROcodone-acetaminophen (NORCO) 5-325 mg per tablet 1 tablet, 1 tablet, Oral, BID PRN, Payton Jeong MD, 1 tablet at 08/25/21 0820    insulin glargine (LANTUS) subcutaneous injection 30 Units 0 3 mL, 30 Units, Subcutaneous, Q12H [address], Payton Fulton MD, 30 Units at 08/25/21 0809    insulin lispro (HumaLOG) 100 units/mL subcutaneous injection 2-12 Units, 2-12 Units, Subcutaneous, TID AC, 4 Units at 08/25/21 1305 **AND** Fingerstick Glucose (POCT), , , 4x Daily AC and at bedtime, Catarino Snyder MD    isosorbide mononitrate (IMDUR) 24 hr tablet 30 mg, 30 mg, Oral, Daily, Christine Phillip MD, 30 mg at 08/25/21 0809    levothyroxine tablet 50 mcg, 50 mcg, Oral, Daily, Christine Phillip MD, 50 mcg at 08/25/21 0608    nitroglycerin (NITROSTAT) SL tablet 0 4 mg, 0 4 mg, Sublingual, Q5 Min PRN, Catarino Phillip MD    ondansetron (ZOFRAN-ODT) dispersible tablet 4 mg, 4 mg, Oral, Q6H PRN, Payton Fulton MD, 4 mg at 08/25/21 1110    REVIEW OF SYSTEMS:  Complete 10 point review of systems were obtained and discussed in length with the patient  Complete review of systems were negative / unremarkable except mentioned in the HPI section       PHYSICAL EXAM:  Current Weight: Weight - Scale: (!) 141 kg (311 lb 15 2 oz)  First Weight: Weight - Scale: (!) 154 kg (339 lb 4 oz)  Vitals:    08/25/21 0722   BP: (!) 178/77   Pulse: 69   Resp: 18   Temp: (!) 96 5 °F (35 8 °C)   SpO2: 98%       Intake/Output Summary (Last 24 hours) at 8/25/2021 1459  Last data filed at 8/25/2021 1300  Gross per 24 hour   Intake 480 ml   Output 2350 ml   Net -1870 ml     Wt Readings from Last 3 Encounters:   08/25/21 (!) 141 kg (311 lb 15 2 oz)   08/03/21 132 kg (290 lb)   08/03/21 132 kg (291 lb)     Temp Readings from Last 3 Encounters:   08/25/21 (!) 96 5 °F (35 8 °C) (Temporal)   08/03/21 (!) 97 1 °F (36 2 °C) (Temporal)   07/13/21 97 6 °F (36 4 °C) (Temporal)     BP Readings from Last 3 Encounters:   08/25/21 (!) 178/77   08/03/21 160/80   08/03/21 160/90     Pulse Readings from Last 3 Encounters:   08/25/21 69   08/03/21 81   08/03/21 92        Physical Examination:  General:  Sitting in chair, no acute distress  Eyes:  Mild conjunctival pallor present  ENT:  External examination of ears and nose unremarkable  Neck:  No obvious lymphadenopathy appreciated  Respiratory:  Bilateral air entry present  CVS:  S1, S2 present  GI:  Soft, nondistended, status post SPC present  CNS:  Active alert oriented x3  Extremities:  2+ edema in legs  Psych:  Conscious, coherent, oriented  Skin:  No new rash in legs    Invasive Devices:      Lab Results:   Results from last 7 days   Lab Units 08/25/21  0507 08/24/21  0430 08/23/21  2046   WBC Thousand/uL 8 20 9 60 11 00   HEMOGLOBIN g/dL 8 7* 8 5* 8 6*   HEMATOCRIT % 26 0* 25 1* 25 7*   PLATELETS Thousands/uL 213 220 230   POTASSIUM mmol/L 4 8 4 5 4 9   CHLORIDE mmol/L 109* 109* 111*   CO2 mmol/L 26 22 22   BUN mg/dL 57* 57* 56*   CREATININE mg/dL 2 41* 2 35* 2 37*   CALCIUM mg/dL 8 9 8 3* 8 3*   MAGNESIUM mg/dL  --  2 2 2 0   PHOSPHORUS mg/dL  --  5 1*  --        Other Studies:   XR chest portable   Final Result by Yomaira Clark MD (08/24 0551)      Small left pleural effusion  Workstation performed: [id number]         Chest x-ray images personally reviewed which shows left pleural effusion  Portions of the record may have been created with voice recognition software  Occasional wrong word or "sound a like" substitutions may have occurred due to the inherent limitations of voice recognition software  Read the chart carefully and recognize, using context, where substitutions have occurred

## 2021-08-26 DIAGNOSIS — I10 ESSENTIAL HYPERTENSION: ICD-10-CM

## 2021-08-26 LAB
ANION GAP SERPL CALCULATED.3IONS-SCNC: 10 MMOL/L (ref 5–14)
ATRIAL RATE: 74 BPM
ATRIAL RATE: 82 BPM
BACTERIA UR CULT: ABNORMAL
BASOPHILS # BLD AUTO: 0.1 THOUSANDS/ΜL (ref 0–0.1)
BASOPHILS NFR BLD AUTO: 1 % (ref 0–1)
BUN SERPL-MCNC: 56 MG/DL (ref 5–25)
CALCIUM SERPL-MCNC: 8.9 MG/DL (ref 8.4–10.2)
CHLORIDE SERPL-SCNC: 106 MMOL/L (ref 97–108)
CO2 SERPL-SCNC: 24 MMOL/L (ref 22–30)
CREAT SERPL-MCNC: 2.31 MG/DL (ref 0.6–1.2)
EOSINOPHIL # BLD AUTO: 0.3 THOUSAND/ΜL (ref 0–0.4)
EOSINOPHIL NFR BLD AUTO: 3 % (ref 0–6)
ERYTHROCYTE [DISTWIDTH] IN BLOOD BY AUTOMATED COUNT: 16.9 %
GFR SERPL CREATININE-BSD FRML MDRD: 23 ML/MIN/1.73SQ M
GLUCOSE SERPL-MCNC: 185 MG/DL (ref 65–140)
GLUCOSE SERPL-MCNC: 224 MG/DL (ref 65–140)
GLUCOSE SERPL-MCNC: 251 MG/DL (ref 70–99)
GLUCOSE SERPL-MCNC: 252 MG/DL (ref 65–140)
GLUCOSE SERPL-MCNC: 253 MG/DL (ref 65–140)
HCT VFR BLD AUTO: 26.7 % (ref 36–46)
HGB BLD-MCNC: 9 G/DL (ref 12–16)
LYMPHOCYTES # BLD AUTO: 1.5 THOUSANDS/ΜL (ref 0.5–4)
LYMPHOCYTES NFR BLD AUTO: 19 % (ref 25–45)
MAGNESIUM SERPL-MCNC: 1.9 MG/DL (ref 1.6–2.3)
MCH RBC QN AUTO: 32.3 PG (ref 26–34)
MCHC RBC AUTO-ENTMCNC: 33.7 G/DL (ref 31–36)
MCV RBC AUTO: 96 FL (ref 80–100)
MONOCYTES # BLD AUTO: 0.8 THOUSAND/ΜL (ref 0.2–0.9)
MONOCYTES NFR BLD AUTO: 9 % (ref 1–10)
NEUTROPHILS # BLD AUTO: 5.7 THOUSANDS/ΜL (ref 1.8–7.8)
NEUTS SEG NFR BLD AUTO: 68 % (ref 45–65)
P AXIS: 33 DEGREES
P AXIS: 39 DEGREES
PHOSPHATE SERPL-MCNC: 5.6 MG/DL (ref 2.5–4.8)
PLATELET # BLD AUTO: 220 THOUSANDS/UL (ref 150–450)
PMV BLD AUTO: 7.5 FL (ref 8.9–12.7)
POTASSIUM SERPL-SCNC: 4.5 MMOL/L (ref 3.6–5)
PR INTERVAL: 142 MS
PR INTERVAL: 156 MS
QRS AXIS: -22 DEGREES
QRS AXIS: -26 DEGREES
QRSD INTERVAL: 144 MS
QRSD INTERVAL: 154 MS
QT INTERVAL: 434 MS
QT INTERVAL: 448 MS
QTC INTERVAL: 481 MS
QTC INTERVAL: 523 MS
RBC # BLD AUTO: 2.8 MILLION/UL (ref 4–5.2)
SODIUM SERPL-SCNC: 140 MMOL/L (ref 137–147)
T WAVE AXIS: 104 DEGREES
T WAVE AXIS: 90 DEGREES
VENTRICULAR RATE: 74 BPM
VENTRICULAR RATE: 82 BPM
WBC # BLD AUTO: 8.3 THOUSAND/UL (ref 4.5–11)

## 2021-08-26 PROCEDURE — 82948 REAGENT STRIP/BLOOD GLUCOSE: CPT

## 2021-08-26 PROCEDURE — 84100 ASSAY OF PHOSPHORUS: CPT | Performed by: STUDENT IN AN ORGANIZED HEALTH CARE EDUCATION/TRAINING PROGRAM

## 2021-08-26 PROCEDURE — 83735 ASSAY OF MAGNESIUM: CPT | Performed by: STUDENT IN AN ORGANIZED HEALTH CARE EDUCATION/TRAINING PROGRAM

## 2021-08-26 PROCEDURE — 99232 SBSQ HOSP IP/OBS MODERATE 35: CPT | Performed by: INTERNAL MEDICINE

## 2021-08-26 PROCEDURE — 80048 BASIC METABOLIC PNL TOTAL CA: CPT | Performed by: INTERNAL MEDICINE

## 2021-08-26 PROCEDURE — 99232 SBSQ HOSP IP/OBS MODERATE 35: CPT | Performed by: FAMILY MEDICINE

## 2021-08-26 PROCEDURE — 93010 ELECTROCARDIOGRAM REPORT: CPT | Performed by: INTERNAL MEDICINE

## 2021-08-26 PROCEDURE — 85025 COMPLETE CBC W/AUTO DIFF WBC: CPT | Performed by: FAMILY MEDICINE

## 2021-08-26 PROCEDURE — 97163 PT EVAL HIGH COMPLEX 45 MIN: CPT

## 2021-08-26 RX ORDER — INSULIN GLARGINE 100 [IU]/ML
32 INJECTION, SOLUTION SUBCUTANEOUS
Status: DISCONTINUED | OUTPATIENT
Start: 2021-08-26 | End: 2021-08-27

## 2021-08-26 RX ORDER — AMLODIPINE BESYLATE 5 MG/1
TABLET ORAL
Qty: 30 TABLET | Refills: 0 | Status: SHIPPED | OUTPATIENT
Start: 2021-08-26 | End: 2021-09-24

## 2021-08-26 RX ORDER — INSULIN GLARGINE 100 [IU]/ML
38 INJECTION, SOLUTION SUBCUTANEOUS EVERY MORNING
Status: DISCONTINUED | OUTPATIENT
Start: 2021-08-26 | End: 2021-08-27

## 2021-08-26 RX ADMIN — AMLODIPINE BESYLATE 5 MG: 5 TABLET ORAL at 09:04

## 2021-08-26 RX ADMIN — DOCUSATE SODIUM 100 MG: 100 CAPSULE, LIQUID FILLED ORAL at 09:04

## 2021-08-26 RX ADMIN — FUROSEMIDE 40 MG: 10 INJECTION, SOLUTION INTRAVENOUS at 16:25

## 2021-08-26 RX ADMIN — INSULIN GLARGINE 32 UNITS: 100 INJECTION, SOLUTION SUBCUTANEOUS at 22:00

## 2021-08-26 RX ADMIN — DOCUSATE SODIUM 100 MG: 100 CAPSULE, LIQUID FILLED ORAL at 17:37

## 2021-08-26 RX ADMIN — ISOSORBIDE MONONITRATE 30 MG: 30 TABLET, EXTENDED RELEASE ORAL at 09:04

## 2021-08-26 RX ADMIN — HEPARIN SODIUM 5000 UNITS: 5000 INJECTION INTRAVENOUS; SUBCUTANEOUS at 06:11

## 2021-08-26 RX ADMIN — Medication 2000 UNITS: at 09:05

## 2021-08-26 RX ADMIN — ASPIRIN 81 MG: 81 TABLET, COATED ORAL at 09:05

## 2021-08-26 RX ADMIN — CEFTRIAXONE 1000 MG: 1 INJECTION, SOLUTION INTRAVENOUS at 22:05

## 2021-08-26 RX ADMIN — HEPARIN SODIUM 5000 UNITS: 5000 INJECTION INTRAVENOUS; SUBCUTANEOUS at 13:07

## 2021-08-26 RX ADMIN — INSULIN LISPRO 6 UNITS: 100 INJECTION, SOLUTION INTRAVENOUS; SUBCUTANEOUS at 13:04

## 2021-08-26 RX ADMIN — CARVEDILOL 25 MG: 12.5 TABLET, FILM COATED ORAL at 09:08

## 2021-08-26 RX ADMIN — FUROSEMIDE 40 MG: 10 INJECTION, SOLUTION INTRAVENOUS at 09:04

## 2021-08-26 RX ADMIN — ATORVASTATIN CALCIUM 40 MG: 40 TABLET, FILM COATED ORAL at 17:37

## 2021-08-26 RX ADMIN — FERROUS SULFATE TAB 325 MG (65 MG ELEMENTAL FE) 325 MG: 325 (65 FE) TAB at 09:05

## 2021-08-26 RX ADMIN — INSULIN LISPRO 2 UNITS: 100 INJECTION, SOLUTION INTRAVENOUS; SUBCUTANEOUS at 16:32

## 2021-08-26 RX ADMIN — HYDROCODONE BITARTRATE AND ACETAMINOPHEN 1 TABLET: 5; 325 TABLET ORAL at 13:07

## 2021-08-26 RX ADMIN — CLOPIDOGREL BISULFATE 75 MG: 75 TABLET ORAL at 09:04

## 2021-08-26 RX ADMIN — ONDANSETRON 4 MG: 4 TABLET, ORALLY DISINTEGRATING ORAL at 09:03

## 2021-08-26 RX ADMIN — CARVEDILOL 25 MG: 12.5 TABLET, FILM COATED ORAL at 16:25

## 2021-08-26 RX ADMIN — INSULIN GLARGINE 38 UNITS: 100 INJECTION, SOLUTION SUBCUTANEOUS at 09:04

## 2021-08-26 RX ADMIN — INSULIN LISPRO 6 UNITS: 100 INJECTION, SOLUTION INTRAVENOUS; SUBCUTANEOUS at 09:05

## 2021-08-26 RX ADMIN — HEPARIN SODIUM 5000 UNITS: 5000 INJECTION INTRAVENOUS; SUBCUTANEOUS at 22:04

## 2021-08-26 RX ADMIN — LEVOTHYROXINE SODIUM 50 MCG: 50 TABLET ORAL at 06:11

## 2021-08-26 NOTE — PROGRESS NOTES
Progress Note    Baljeet Clemons 64 y o  female MRN: [id number]  Unit/Bed#: 7T [facility] 708-01 Encounter: [id number]  Admitting Physician: Duncan Lee MD  PCP: CHERELLE Monson  Date of Admission:  8/23/2021  8:23 PM    Assessment and Plan    * New onset of congestive heart failure ([address] )  Assessment & Plan  Wt Readings from Last 3 Encounters:   08/25/21 (!) 141 kg (311 lb 15 2 oz)   08/03/21 132 kg (290 lb)   08/03/21 132 kg (291 lb)     Likely secondary to UTI vs excessive fluid intake vs discontinuation of diuretic therapy due to worsening kidney function   Dry weight:  132 kg  Echo this admission :Ejection fraction was estimated to be 45 %  There were regional wall motion abnormalities  Home meds:  Carvedilol 25 mg b i d , amlodipine 5 mg daily, isosorbide mononitrate 30 mg daily    TSH (August 2021) normal Troponin:  Negative x3  EKG:  NSR, LBBB CXR:  Unremarkable  NYHA Functional Class:  III    Bicarb decreasing in spite of diuresis which is an indicator that patient still as volume to remove  Creatinine decreasing in the setting of diuretic use  Though  questionable accuracy of weight reported since she's had 7kg drop in her weight with only 2L of fluid deficit  Plan to:   - Continue Telemetry  - Supplemental O2 to maintain saturation > 92%  - Continue Lasix 40 mg IV BID  Per cardiology recommendation   - Fluid restriction @ 1200 cc/day and low salt diet  - Continue other home medications  - Will likely plan for a catherization to be done this admission once patient gets close to her baseline dry weight  - Strict I&O and Daily Weights   - CBC, CMP, Mg, Phos in am        CAD (coronary artery disease)  Assessment & Plan  Status post PCI x5 with stents placed in 2012 and 2017 [facility])  History of abnormal EKG reported by patient in the past  (no baseline EKG seen on file)     Troponin negative x 2  EKG:  NSR, LBBB    Plan  -GTN 0 4 mg sublingual p r n ilure  - continue amlodipine 5 mg daily, isosorbide mononitrate 30 mg daily, clopidogrel 75 mg daily, aspirin 81 mg daily, Metoprolol 25 mg daily   -cardiology recommend heart cath after diureses is complete    CKD (chronic kidney disease) stage 4, GFR 15-29 ml/min [facility])  Assessment & Plan  Lab Results   Component Value Date    EGFR 23 (L) 08/26/2021    EGFR 21 (L) 08/25/2021    EGFR 22 (L) 08/24/2021    CREATININE 2 31 (H) 08/26/2021    CREATININE 2 41 (H) 08/25/2021    CREATININE 2 35 (H) 08/24/2021     Baseline creatinine 2 3-2 6 from May and 1 6-1 7 from Augste 2020 UA which is [facility] specimen shows 20 to 30 RBCs/30 to 50 WBCs, innumerable bacteria, 3+ proteinuria  Patient had  Detailed discussion with nephrology and cardiology  regarding risk of ALFRED with contrast exposure and small risk of dialysis is she undergoes catheterization  and verbalized understanding  Nephrology colleagues on board appreciate recs      Plan     -Will avoid all nephrotoxic medications and procedures as possible   -Will continue diuresis given CHF exacerbation with Lasix IV 40 mg BID   -closely monitor renal function while aggressively diuresing   -avoid ACE-inhibitor or ARB for time being   -Monitor urine output closely  Type 2 diabetes mellitus with diabetic polyneuropathy, with long-term current use of insulin [facility])  Assessment & Plan  Lab Results   Component Value Date    HGBA1C 7 1 (H) 08/03/2021       Recent Labs     08/25/21  1603 08/25/21  2022 08/26/21  0555 08/26/21  1101   POCGLU 240* 260* 252* 253*       Blood Sugar Average: Last 72 hrs:  (P) 217 [id number]   POA glucose 57  On admission  Home regiment include  Lantus 50 units b i d , Humalog 10 units t i  d  Recently more compliant with home medication explaining the rapid  change in her A1C levels  Thought surrounding that her insulin requirements might over estimate her current needs while patient is complaint   Patient glucose remains high inspite on 30 units BID and 10 units of correctional through out the day yesterday  Plan     - increase lantus to 38 units in the Am and 32 units in th  PM    - Continue SSI   - Insulin Sliding Scale @ weight based Algorithm 3 with accuchecks ACHS  - Diabetic Diet with card restrcition level 3      Prolonged QT interval  Assessment & Plan  Mild at 486  Will continue to monitor and replete electrolytes as necessary  Will continue to monitor on telemetry  Hypocalcemia  Assessment & Plan  Lab Results   Component Value Date    CALCIUM 8 9 08/25/2021    PHOS 5 1 (H) 08/24/2021     Corrected to 8 6 with albumin levels of 3 6  Ionized calcium level came back at 1 09  1 g of calcium gluconate was given  Morning draw calcium resulted 8 9  PTH levels elevated 107 and Vit D at 23 6  This indicates hypocalcemia likely  In the setting of secondary hypoparathyroidism as a result of chronic kidney disease      Plan  Will  Start Vitamin D3 2000 units daily   -will continue to monitor creatinine  -will continue to follow on morning CMP       Neurogenic bladder  Assessment & Plan  Suspected secondary to diabetes  Has suprapubic catheter placed in June 2021 changed Qmonthly  Last changed 08/03/2021 by [facility]  Catheter site clean with no dressing  Urinary tract infection associated with cystostomy catheter [facility])  Assessment & Plan  Urinalysis positive for blood, nitrates, white blood cells and bacteria  Plan     Will follow-up urine culture- negative to date   Continue ceftriaxone 1 g IV daily  Urology recommends maintain ABX until morales can be replaced which will occur at the [facility]  Will touch base with them as patient is likely to remain here for the weekend and obtain recs for abx and catheter management  HTN (hypertension)  Assessment & Plan  Blood Pressure: 149/79  currently not at goal (JNC 8:  BP less than 130/80)    Plan     Will continue amlodipine 5 mg daily and IV Lasix 40 mg daily    Low-salt diet  Monitor blood pressure t i d     Normochromic normocytic anemia  Assessment & Plan  Normocytic normochromic  Likely secondary to CKD  However no prior colonoscopy done  Labs were significant for low iron levels at 26 and TIBC at 234  Plan   Will continue to follow CBC   Ferrous sulfate 325mg every other day   Follow up on FOBT   Low threshold for blood transfusion with hemoglobin less than 8  Will recommend colonoscopy to be done outpatient         VTE Pharmacologic Prophylaxis:   Pharmacologic: Heparin  Mechanical VTE Prophylaxis in Place: Yes    Patient Centered Rounds: I have performed bedside rounds with nursing staff today  Discussions with Specialists or Other Care Team Provider:  Cardiology, Nephrology  Education and Discussions with Family / Patient:  Extensive discussion had regarding patient possible pending cath lab  Specifically, discussion regarding risk of additional kidney injury possibly requiring dialysis  Conversation was had with Dr Ana Chong the cardiologist     Time Spent for Care: 30 minutes  More than 50% of total time spent on counseling and coordination of care as described above  Current Length of Stay: 3 day(s)    Current Patient Status: Inpatient   Certification Statement: The patient will continue to require additional inpatient hospital stay due to Continuous diuresis    Discharge Plan:  Per cardiology recommendations as well as Nephrology  Consulting services will continue to follow throughout the admission  Code Status: Level 1 - Full Code      Subjective:   Patient reports to be doing better though has some reported nausea that is relieved with Zofran  From a respiratory perspective, patient reports to be breathing better after diureses  She denies any chest pain or shortness of breath        Objective:     Vitals:   Temp (24hrs), Av 3 °F (35 7 °C), Min:96 °F (35 6 °C), Max:96 6 °F (35 9 °C)    Temp:  [96 °F (35 6 °C)-96 6 °F (35 9 °C)] 96 6 °F (35 9 °C)  HR:  [66-75] 75  Resp:  [18] 18  BP: (131-164)/(64-85) 164/85  SpO2:  [91 %-93 %] 91 %  Body mass index is 45 15 kg/m²  Input and Output Summary (last 24 hours): Intake/Output Summary (Last 24 hours) at 8/26/2021 1310  Last data filed at 8/26/2021 0900  Gross per 24 hour   Intake 360 ml   Output 2450 ml   Net -2090 ml       Physical Exam:     Physical Exam  Vitals and nursing note reviewed  Constitutional:       General: She is not in acute distress  Appearance: Normal appearance  She is obese  She is not ill-appearing, toxic-appearing or diaphoretic  HENT:      Head: Normocephalic  Nose: Nose normal  No congestion or rhinorrhea  Mouth/Throat:      Mouth: Mucous membranes are moist       Pharynx: Oropharynx is clear  No oropharyngeal exudate or posterior oropharyngeal erythema  Eyes:      General: No scleral icterus  Right eye: No discharge  Left eye: No discharge  Conjunctiva/sclera: Conjunctivae normal    Cardiovascular:      Rate and Rhythm: Normal rate and regular rhythm  Pulses: Normal pulses  Heart sounds: Normal heart sounds  No murmur heard  No friction rub  No gallop  Pulmonary:      Effort: Pulmonary effort is normal  No respiratory distress  Breath sounds: Normal breath sounds  No stridor  No wheezing, rhonchi or rales  Chest:      Chest wall: No tenderness  Abdominal:      General: Bowel sounds are normal  There is no distension  Palpations: Abdomen is soft  There is no mass  Tenderness: There is no abdominal tenderness  There is no guarding or rebound  Hernia: A hernia (Umbilical hernia noted) is present  Musculoskeletal:         General: No tenderness, deformity or signs of injury  Right lower leg: No tenderness  3+ Pitting Edema present  Left lower leg: No tenderness  3+ Pitting Edema present  Legs:       Comments: Bilateral pitting edema to the level of the knee  Erythematous patch of skin noted on right shin     Feet: Right foot:      Skin integrity: No ulcer  Left foot:      Skin integrity: No ulcer  Skin:     General: Skin is warm  Coloration: Skin is not jaundiced or pale  Findings: No bruising or lesion  Neurological:      General: No focal deficit present  Mental Status: She is alert  Psychiatric:         Mood and Affect: Mood normal          Behavior: Behavior normal      Additional Data:     Labs:    Results from last 7 days   Lab Units 08/26/21  0444   WBC Thousand/uL 8 30   HEMOGLOBIN g/dL 9 0*   HEMATOCRIT % 26 7*   PLATELETS Thousands/uL 220   NEUTROS PCT % 68*   LYMPHS PCT % 19*   MONOS PCT % 9   EOS PCT % 3     Results from last 7 days   Lab Units 08/26/21  0444 08/25/21  0507   POTASSIUM mmol/L 4 5 4 8   CHLORIDE mmol/L 106 109*   CO2 mmol/L 24 26   BUN mg/dL 56* 57*   CREATININE mg/dL 2 31* 2 41*   CALCIUM mg/dL 8 9 8 9   ALK PHOS U/L  --  99   ALT U/L  --  14   AST U/L  --  21     Results from last 7 days   Lab Units 08/23/21  2046   INR  1 03     Results from last 7 days   Lab Units 08/26/21  1101 08/26/21  0555 08/25/21  2022 08/25/21  1603 08/25/21  1118 08/25/21  0538 08/24/21  1958 08/24/21  1558 08/24/21  1129 08/24/21  0545 08/24/21  0034   POC GLUCOSE mg/dl 253* 252* 260* 240* 204* 151* 212* 251* 244* 167* 155*             * I Have Reviewed All Lab Data Listed Above  * Additional Pertinent Lab Tests Reviewed:  [facility] Admission Reviewed    Imaging:    Imaging Reports Reviewed Today Include:  No imaging obtained and last 24 hours      Recent Cultures (last 7 days):     Results from last 7 days   Lab Units 08/23/21  2051   URINE CULTURE  >100,000 cfu/ml Klebsiella oxytoca*       Last 24 Hours Medication List:   Current Facility-Administered Medications   Medication Dose Route Frequency Provider Last Rate    amLODIPine  5 mg Oral Daily Concha Day MD      aspirin  81 mg Oral Daily Catarino Love MD      atorvastatin  40 mg Oral After [employer] Catarino Hernandez MD      carvedilol  25 mg Oral BID With Meals Payton Ponce MD      cefTRIAXone  1,000 mg Intravenous Q24H Rogers Rinne, MD 1,000 mg (08/25/21 2129)    cholecalciferol  2,000 Units Oral Daily Sean Guillaume MD      clopidogrel  75 mg Oral Daily Catarino Hernandez MD      docusate sodium  100 mg Oral BID Catarino Hernandez MD      ferrous sulfate  325 mg Oral Every Other Day Payton Ponce MD      furosemide  40 mg Intravenous BID (diuretic) Payton Ponce MD      heparin (porcine)  5,000 Units Subcutaneous Q8H [facility] Catarino Hernandez MD      HYDROcodone-acetaminophen  1 tablet Oral BID PRN Payton Ponce MD      insulin glargine  32 Units Subcutaneous HS Payton Fulton MD      insulin glargine  38 Units Subcutaneous QAM Payton Fulton MD      insulin lispro  2-12 Units Subcutaneous TID AC Benjamen Manoj Phillip MD      isosorbide mononitrate  30 mg Oral Daily Catarino Hernandez MD      levothyroxine  50 mcg Oral Daily Catarino Hernandez MD      nitroglycerin  0 4 mg Sublingual Q5 Min PRN Catarino Hernandez MD      ondansetron  4 mg Oral Q6H PRN Payton Ponce MD          ** Please Note: Dictation voice to text software may have been used in the creation of this document   **    Payton Ponce MD  08/26/21  1:10 PM

## 2021-08-26 NOTE — PLAN OF CARE
Problem: PHYSICAL THERAPY ADULT  Goal: Performs mobility at highest level of function for planned discharge setting  See evaluation for individualized goals  Description: Treatment/Interventions: Functional transfer training, LE strengthening/ROM, Therapeutic exercise, Endurance training, Elevations, Bed mobility, Gait training, Equipment eval/education, Compensatory technique education, Spoke to nursing, Spoke to case management          See flowsheet documentation for full assessment, interventions and recommendations  Note: Prognosis: Good  Problem List: Impaired sensation, Decreased strength, Decreased endurance, Decreased mobility     Barriers to Discharge: Inaccessible home environment        PT Discharge Recommendation: Home with home health rehabilitation          See flowsheet documentation for full assessment

## 2021-08-26 NOTE — PROGRESS NOTES
NEPHROLOGY PROGRESS NOTE   Kurt Perkins 64 y o  female MRN: [id number]  Unit/Bed#: 7T [facility] 708-01 Encounter: [id number]  Reason for Consult: ALFRED    ASSESSMENT AND PLAN:  Patient is 57-year-old female with significant medical issues of diabetes for more than 20 years, hypertension for many years, progressive CKD stage 4, neurogenic bladder, status post suprapubic catheter placed, combined CHF, hyperlipidemia, CAD, status post PTCA, presented with shortness of breath, weight gain  We are consulted for CKD management      Progressive CKD stage 4, baseline creatinine 2 3 to 2 6 since May 2021, prior 1 6 to 1 7 since August 2020, follows with Dr Donny Carmen  -last creatinine 2 3 overall stable at baseline  -CKD suspect in the setting of uncontrolled diabetes for long-term, hypertension, in the setting of morbidly obese  -UA which is [facility] specimen shows 20 to 30 RBCs/30 to 50 WBCs, innumerable bacteria, 3+ proteinuria  -avoid nephrotoxins or NSAIDs  -eventually patient needs cardiac catheterization  Tentatively on Monday as per cardiology note  I had detailed discussion with patient regarding risk of ALFRED with contrast exposure and small risk of dialysis  She verbalized understanding of my discussion with her   -if her volume status over next 2 to 3 days, would like to prep her with very gentle IV fluid total 500 mL at 50 mL/hour 6 hours prior to IV contrast with cardiac catheterization and continue for at least 4 hours afterwards starting Sunday night   -closely monitor renal function while aggressively diuresing   -renal ultrasound in May 2021 shows normal size kidneys, normal echogenicity, no hydronephrosis or stones      Hypertension  -outpatient regimen includes amlodipine 5 mg daily, Coreg 25 mg p o  B i d , Imdur 30 mg daily   -continue same regimen   -blood pressure overall acceptable    Continue to monitor with aggressive diuresis as also suspect volume mediated component contributing to elevated BP   -goal SBP 130s  -avoiding ACE-inhibitor or ARB for time being given progressive CKD and while aggressively diuresing with anticipated cardiac catheterization     Nephrotic range proteinuria  -last UPC ratio 5 7 g in August 2021   -suspect in the setting of prior uncontrolled diabetes for many years, 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 morbid obesity  -last hemoglobin A1c 7 1 although prior values have been anywhere from 8 to 14 going back to 2019  -avoiding Ace inhibitor or ARB for time being although this may need to be considered in the future as outpatient once renal function remains stable   -serum immunofixation shows no monoclonal gammopathy  UPEP negative     Combined CHF, echo shows EF 04%, grade 2 diastolic dysfunction, normal IVC  -currently diuresing well with Lasix 40 mg IV b i d  With negative balance  daily standing weight, accurate intake and output   -goal to keep negative balance  -she weight overall improving    -eventual plan for cardiac cath once more close to euvolemia      Anemia in CKD, hemoglobin below goal, transfuse p r n  For hemoglobin less than seven  Continue to closely monitor, iron saturation 20% with ferritin 44      Neurogenic bladder, status post suprapubic catheter placement       Discussed above plan in detail with primary team     SUBJECTIVE:  Patient seen and examined at bedside   No chest pain, denies worsening shortness of breath, nausea, vomiting, abdominal pain    OBJECTIVE:  Current Weight: Weight - Scale: 135 kg (296 lb 15 4 oz)  Vitals:    08/26/21 1530   BP: 133/61   Pulse: 65   Resp: 18   Temp: (!) 96 8 °F (36 °C)   SpO2: 91%       Intake/Output Summary (Last 24 hours) at 8/26/2021 1541  Last data filed at 8/26/2021 1307  Gross per 24 hour   Intake 360 ml   Output 3000 ml   Net -2640 ml     Wt Readings from Last 3 Encounters:   08/26/21 135 kg (296 lb 15 4 oz)   08/03/21 132 kg (290 lb)   08/03/21 132 kg (291 lb)     Temp Readings from Last 3 Encounters:   08/26/21 (!) 96 8 °F (36 °C) (Temporal)   08/03/21 (!) 97 1 °F (36 2 °C) (Temporal)   07/13/21 97 6 °F (36 4 °C) (Temporal)     BP Readings from Last 3 Encounters:   08/26/21 133/61   08/03/21 160/80   08/03/21 160/90     Pulse Readings from Last 3 Encounters:   08/26/21 65   08/03/21 81   08/03/21 92        Physical Examination:  General:  Sitting in chair, no acute distress   Eyes:  Mild conjunctival pallor present  ENT:  External examination of ears and nose unremarkable  Neck:  No obvious lymphadenopathy appreciated  Respiratory:  Bilateral air entry present  CVS:  S1, S2 present  GI:  Soft, nondistended  CNS:  Active alert oriented x3  Skin:  No new rash in legs  Musculoskeletal:  No Obvious new gross deformity noted    Medications:    Current Facility-Administered Medications:     amLODIPine (NORVASC) tablet 5 mg, 5 mg, Oral, Daily, Claudio Schmidt MD, 5 mg at 08/26/21 0904    aspirin (ECOTRIN LOW STRENGTH) EC tablet 81 mg, 81 mg, Oral, Daily, Vane Phillip MD, 81 mg at 08/26/21 0905    atorvastatin (LIPITOR) tablet 40 mg, 40 mg, Oral, After Dinner, Kristen Mitchell MD, 40 mg at 08/25/21 1745    carvedilol (COREG) tablet 25 mg, 25 mg, Oral, BID With Meals, Payton Fulton MD, 25 mg at 08/26/21 0908    cefTRIAXone (ROCEPHIN) IVPB (premix in dextrose) 1,000 mg 50 mL, 1,000 mg, Intravenous, Q24H, Vane Phillip MD, Last Rate: 100 mL/hr at 08/25/21 2129, 1,000 mg at 08/25/21 2129    cholecalciferol (VITAMIN D3) tablet 2,000 Units, 2,000 Units, Oral, Daily, Kisha Lr MD, 2,000 Units at 08/26/21 0905    clopidogrel (PLAVIX) tablet 75 mg, 75 mg, Oral, Daily, Vane Phillip MD, 75 mg at 08/26/21 0904    docusate sodium (COLACE) capsule 100 mg, 100 mg, Oral, BID, Vane Phillip, MD, 100 mg at 08/26/21 2542    ferrous sulfate tablet 325 mg, 325 mg, Oral, Every Other Day, Payton Fulton MD, 325 mg at 08/26/21 0905    furosemide (LASIX) injection 40 mg, 40 mg, Intravenous, BID (diuretic), Payton Chaney MD, 40 mg at 08/26/21 0904    heparin (porcine) subcutaneous injection 5,000 Units, 5,000 Units, Subcutaneous, Q8H [address], Theo Veras MD, 5,000 Units at 08/26/21 1307    HYDROcodone-acetaminophen (NORCO) 5-325 mg per tablet 1 tablet, 1 tablet, Oral, BID PRN, Payton Chaney MD, 1 tablet at 08/26/21 1307    insulin glargine (LANTUS) subcutaneous injection 32 Units 0 32 mL, 32 Units, Subcutaneous, HS, Payton Fulton MD    insulin glargine (LANTUS) subcutaneous injection 38 Units 0 38 mL, 38 Units, Subcutaneous, QAM, Payton Chaney MD, 38 Units at 08/26/21 0904    insulin lispro (HumaLOG) 100 units/mL subcutaneous injection 2-12 Units, 2-12 Units, Subcutaneous, TID AC, 6 Units at 08/26/21 1304 **AND** Fingerstick Glucose (POCT), , , 4x Daily AC and at bedtime, Catarino De Leon MD    isosorbide mononitrate (IMDUR) 24 hr tablet 30 mg, 30 mg, Oral, Daily, Bob Phillip MD, 30 mg at 08/26/21 1915    levothyroxine tablet 50 mcg, 50 mcg, Oral, Daily, Theo Veras MD, 50 mcg at 08/26/21 0611    nitroglycerin (NITROSTAT) SL tablet 0 4 mg, 0 4 mg, Sublingual, Q5 Min PRN, Catarino Phillip MD    ondansetron (ZOFRAN-ODT) dispersible tablet 4 mg, 4 mg, Oral, Q6H PRN, Payton Chaney MD, 4 mg at 08/26/21 8287    Laboratory Results:  Results from last 7 days   Lab Units 08/26/21  0444 08/25/21  0507 08/24/21  0430 08/23/21  2046   WBC Thousand/uL 8 30 8 20 9 60 11 00   HEMOGLOBIN g/dL 9 0* 8 7* 8 5* 8 6*   HEMATOCRIT % 26 7* 26 0* 25 1* 25 7*   PLATELETS Thousands/uL 220 213 220 230   SODIUM mmol/L 140 141 139 141   POTASSIUM mmol/L 4 5 4 8 4 5 4 9   CHLORIDE mmol/L 106 109* 109* 111*   CO2 mmol/L 24 26 22 22   BUN mg/dL 56* 57* 57* 56*   CREATININE mg/dL 2 31* 2 41* 2 35* 2 37*   CALCIUM mg/dL 8 9 8 9 8 3* 8 3*   MAGNESIUM mg/dL 1 9  --  2 2 2 0   PHOSPHORUS mg/dL 5 6*  --  5 1*  --        XR chest portable Final Result by Santiago Flowers MD (08/02 9276)      Small left pleural effusion  Workstation performed: [id number]             Portions of the record may have been created with voice recognition software  Occasional wrong word or "sound a like" substitutions may have occurred due to the inherent limitations of voice recognition software  Read the chart carefully and recognize, using context, where substitutions have occurred
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
## 2021-08-26 NOTE — PHYSICAL THERAPY NOTE
Physical Therapy Evaluation    Patient's Name: Stephanie Feng    Admitting Diagnosis  Shortness of breath [R06 02]  CHF (congestive heart failure) ([facility]) [I50 9]  UTI (urinary tract infection) [N39 0]  Essential hypertension [I10]  Dyspnea on exertion [R06 00]  Chronic kidney disease [N18 9]  CKD (chronic kidney disease) stage 4, GFR 15-29 ml/min ([facility]) [N18 4]  Acute exacerbation of CHF (congestive heart failure) ([facility]) [I50 9]  Coronary artery disease involving native heart without angina pectoris, unspecified vessel or lesion type [I25 10]  High output congestive heart failure ([facility]) [I50 83]    Problem List  Patient Active Problem List   Diagnosis    Cellulitis of finger of right hand    Major depressive disorder    Type 2 diabetes mellitus with diabetic polyneuropathy, with long-term current use of insulin ([address] )    Anxiety    CAD (coronary artery disease)    Osteoarthritis of left knee    Healthcare maintenance    Normochromic normocytic anemia    CKD (chronic kidney disease) stage 3, GFR 30-59 ml/min ([facility])    Abnormal LFTs    S/P cervical spinal fusion    Head lump    Need for follow-up by     Diabetic ulcer of toe of right foot associated with type 2 diabetes mellitus, with muscle involvement without evidence of necrosis ([address] )    HTN (hypertension)    Skin ulcer of hand, limited to breakdown of skin ([address] )    Chronic bronchitis ([address] )    Severe episode of recurrent major depressive disorder, without psychotic features ([address] )    Memory impairment    CKD (chronic kidney disease) stage 4, GFR 15-29 ml/min ([facility])    Morbid obesity with BMI of 45 0-49 9, adult ([address] )    History of heart artery stent    ARF (acute renal failure) ([address] )    Urinary tract infection associated with cystostomy catheter ([address] )    Neurogenic bladder    New onset of congestive heart failure (HCC)    Hypocalcemia    Prolonged QT interval       Past Medical History  Past Medical History:   Diagnosis Date    Abnormal liver function     Anemia     Anxiety     Arthritis     Chronic kidney disease     stage 3    Chronic narcotic dependence (HCC)     Chronic pain disorder     lower back, hands , neck and knees    Coronary artery disease     Depression     Diabetes mellitus ([address] )     GERD (gastroesophageal reflux disease)     no meds at present    Heart murmur     murmur    Hyperlipidemia     Hypertension     Neurogenic bladder     Open toe wound 12/2020    right big toe open calus but is dry at present    Renal disorder     Shortness of breath     exertion    Sleep apnea     doesn't use cpap    Suprapubic catheter [facility])        Past Surgical History  Past Surgical History:   Procedure Laterality Date    AMPUTATION      ANGIOPLASTY  2017    5    BREAST EXCISIONAL BIOPSY Left     BREAST SURGERY      CARDIAC CATHETERIZATION      CARPAL TUNNEL RELEASE Bilateral     CERVICAL FUSION      HYSTERECTOMY  2008    IR SUPRAPUBIC CATHETER PLACEMENT  6/15/2021    KNEE SURGERY      OOPHORECTOMY  2008    FL EXC SKIN BENIG 3 1-4 CM REMAINDR BODY N/A 12/21/2020    Procedure: EXCISION SEBACEOUS CYST X 5 SCALP;  Surgeon: Adrián Michael MD;  Location: [address] [place] [address];  Service: General    TOE AMPUTATION Left     TRIGGER FINGER RELEASE Right     4th Finger       Recent Imaging  XR chest portable   Final Result by Cliff Sol MD (08/24 6170)      Small left pleural effusion                    Workstation performed: [id number]             Recent Vital Signs  Vitals:    08/26/21 0600 08/26/21 0708 08/26/21 1047 08/26/21 1530   BP:  164/85  133/61   BP Location:  Right arm  Left arm   Pulse:  75  65   Resp:  18  18   Temp:  (!) 96 6 °F (35 9 °C)  (!) 96 8 °F (36 °C)   TempSrc:  Temporal  Temporal   SpO2:  91%  91%   Weight: 135 kg (298 lb 11 6 oz)  135 kg (296 lb 15 4 oz)    Height:            08/26/21 1205   PT Last Visit   PT Visit Date 08/26/21   Note Type   Note type Evaluation   Pain Assessment   Pain Assessment Tool 0-10   Pain Score 6   Pain Location/Orientation Location: Back; Location: Leg   Home Living   Type of [address] One level   Bathroom Shower/Tub Walk-in shower   Bathroom Toilet Standard   Bathroom Accessibility Accessible via walker   [address] [facility]; Other (Comment)  (Rollator)   Prior Function   Level of [place] Needs assistance with ADLs and functional mobility   Lives With Daughter   Celeste Jaimes in the last 6 months 0   Restrictions/Precautions   Weight Bearing Precautions Per Order No   Other Precautions Multiple lines; Fluid restriction   General   Family/Caregiver Present No   Cognition   Overall Cognitive Status WFL   Attention Within functional limits   Orientation Level Oriented X4   Memory Within functional limits   Following Commands Follows all commands and directions without difficulty   RLE Assessment   RLE Assessment WFL   LLE Assessment   LLE Assessment WFL   Coordination   Movements are Fluid and Coordinated 1   Sensation X   Light Touch   RLE Light Touch Impaired   RLE Light Touch Comments Lateral lower leg and foot decreased d/t neuropathy   LLE Light Touch Impaired   LLE Light Touch Comments   (Lateral leg and foot decreased d/t neuropathy)   Sharp/Dull   RLE Sharp/Dull Not tested   LLE Sharp/Dull Not tested   Proprioception   RLE Proprioception Not tested   LLE Proprioception Not tested   Bed Mobility   Supine to Sit 5  Supervision   Transfers   Sit to Stand 5  Supervision   Stand to Sit 5  Supervision   Ambulation/Elevation   Gait pattern WNL   Gait Assistance   (CGA)   Assistive Device Rolling walker   Distance 50 feet   Balance   Static Sitting Good   Dynamic Sitting Fair +   Static Standing Fair   Dynamic Standing Fair   Ambulatory Fair   Activity Tolerance   Activity Tolerance Patient tolerated treatment well   Assessment   Prognosis Good   Problem List Impaired sensation;Decreased strength;Decreased endurance;Decreased mobility   Barriers to Discharge Inaccessible home environment   Goals   STG Expiration Date 09/05/21   Short Term Goal #1 Patient will perform bed mobility MOD I, transfers, ambulation w/  feet, and navigate FF of stairs MOD I to increase funcitonal independence    Plan   Treatment/Interventions Functional transfer training;LE strengthening/ROM; Therapeutic exercise; Endurance training;Elevations; Bed mobility;Gait training;Equipment eval/education; Compensatory technique education;Spoke to nursing;Spoke to case management   PT Frequency 2-3x/wk   Recommendation   PT Discharge Recommendation Home with home health rehabilitation   [address] [place] [address] Mobility Inpatient   Turning in Bed Without Bedrails 4   Lying on Back to Sitting on Edge of Flat Bed 4   Moving Bed to Chair 3   Standing Up From Chair 4   Walk in Room 3   Climb 3-5 Stairs 3   Basic Mobility Inpatient Raw Score 21   Basic Mobility Standardized Score 45 55         ASSESSMENT                                                                                                                     Jameson Li is a 64 y o  female admitted to [facility] on 8/23/2021 for New onset of congestive heart failure ([facility] )  Pt  has a past medical history of Abnormal liver function, Anemia, Anxiety, Arthritis, Chronic kidney disease, Chronic narcotic dependence ([address] ), Chronic pain disorder, Coronary artery disease, Depression, Diabetes mellitus ([address] ), GERD (gastroesophageal reflux disease), Heart murmur, Hyperlipidemia, Hypertension, Neurogenic bladder, Open toe wound (12/2020), Renal disorder, Shortness of breath, Sleep apnea, and Suprapubic catheter ([facility] )    PT was consulted and pt was seen on 8/26/2021 for mobility assessment and d/c planning  Pt presents semi-recumbent in bed, agreeable to PT  Pt is currently functioning at a supervision assistance x1 level for bed mobility, supervision assistance x1 level for transfers, CGA level for ambulation with Rolling Walker   The patient's AM-PAC Basic Mobility Inpatient Short Form Raw Score is 21, Standardized Score is 45 55  A standardized score greater than 42 9 suggests the patient may benefit from discharge to home  Please also refer to the recommendation of the Physical Therapist for safe discharge planning  Recommendations                                                                                                              Pt will benefit from continued skilled IP PT to address the above mentioned impairments in order to maximize recovery and increase functional independence when completing mobility and ADLs  See flow sheet for goals and POC       DME:  straight cane and rolling walker    Discharge Disposition:  Home with [address] , PT

## 2021-08-26 NOTE — PLAN OF CARE
Problem: PAIN - ADULT  Goal: Verbalizes/displays adequate comfort level or baseline comfort level  Description: Interventions:  - Encourage patient to monitor pain and request assistance  - Assess pain using appropriate pain scale  - Administer analgesics based on type and severity of pain and evaluate response  - Implement non-pharmacological measures as appropriate and evaluate response  - Consider cultural and social influences on pain and pain management  - Notify physician/advanced practitioner if interventions unsuccessful or patient reports new pain  Outcome: Progressing     Problem: INFECTION - ADULT  Goal: Absence or prevention of progression during hospitalization  Description: INTERVENTIONS:  - Assess and monitor for signs and symptoms of infection  - Monitor lab/diagnostic results  - Monitor all insertion sites, i e  indwelling lines, tubes, and drains  - Monitor endotracheal if appropriate and nasal secretions for changes in amount and color  - [place] appropriate cooling/warming therapies per order  - Administer medications as ordered  - Instruct and encourage patient and family to use good hand hygiene technique  - Identify and instruct in appropriate isolation precautions for identified infection/condition  Outcome: Progressing

## 2021-08-26 NOTE — PLAN OF CARE
Problem: Potential for Falls  Goal: Patient will remain free of falls  Description: INTERVENTIONS:  - Educate patient/family on patient safety including physical limitations  - Instruct patient to call for assistance with activity   - Consult OT/PT to assist with strengthening/mobility   - Keep Call bell within reach  - Keep bed low and locked with side rails adjusted as appropriate  - Keep care items and personal belongings within reach  - Initiate and maintain comfort rounds  - Make Fall Risk Sign visible to staff  - Apply yellow socks and bracelet for high fall risk patients  - Consider moving patient to room near nurses station  Outcome: Progressing     Problem: MOBILITY - ADULT  Goal: Maintain or return to baseline ADL function  Description: INTERVENTIONS:  -  Assess patient's ability to carry out ADLs; assess patient's baseline for ADL function and identify physical deficits which impact ability to perform ADLs (bathing, care of mouth/teeth, toileting, grooming, dressing, etc )  - Assess/evaluate cause of self-care deficits   - Assess range of motion  - Assess patient's mobility; develop plan if impaired  - Assess patient's need for assistive devices and provide as appropriate  - Encourage maximum independence but intervene and supervise when necessary  - Involve family in performance of ADLs  - Assess for home care needs following discharge   - Consider OT consult to assist with ADL evaluation and planning for discharge  - Provide patient education as appropriate  Outcome: Progressing  Goal: Maintains/Returns to pre admission functional level  Description: INTERVENTIONS:  - Perform BMAT or MOVE assessment daily    - Set and communicate daily mobility goal to care team and patient/family/caregiver  - Collaborate with rehabilitation services on mobility goals if consulted  - Perform Range of Motion 4 times a day  - Reposition patient every 4 hours    - Dangle patient 4 times a day  - Stand patient 4 times a day  - Ambulate patient 4 times a day  - Out of bed to chair 4 times a day   - Out of bed for meals 4 times a day  - Out of bed for toileting  - Record patient progress and toleration of activity level   Outcome: Progressing     Problem: PAIN - ADULT  Goal: Verbalizes/displays adequate comfort level or baseline comfort level  Description: Interventions:  - Encourage patient to monitor pain and request assistance  - Assess pain using appropriate pain scale  - Administer analgesics based on type and severity of pain and evaluate response  - Implement non-pharmacological measures as appropriate and evaluate response  - Consider cultural and social influences on pain and pain management  - Notify physician/advanced practitioner if interventions unsuccessful or patient reports new pain  Outcome: Progressing     Problem: INFECTION - ADULT  Goal: Absence or prevention of progression during hospitalization  Description: INTERVENTIONS:  - Assess and monitor for signs and symptoms of infection  - Monitor lab/diagnostic results  - Monitor all insertion sites, i e  indwelling lines, tubes, and drains  - Monitor endotracheal if appropriate and nasal secretions for changes in amount and color  - Rosalia appropriate cooling/warming therapies per order  - Administer medications as ordered  - Instruct and encourage patient and family to use good hand hygiene technique  - Identify and instruct in appropriate isolation precautions for identified infection/condition  Outcome: Progressing  Goal: Absence of fever/infection during neutropenic period  Description: INTERVENTIONS:  - Monitor WBC    Outcome: Progressing     Problem: SAFETY ADULT  Goal: Patient will remain free of falls  Description: INTERVENTIONS:  - Educate patient/family on patient safety including physical limitations  - Instruct patient to call for assistance with activity   - Consult OT/PT to assist with strengthening/mobility   - Keep Call bell within reach  - Keep bed low and locked with side rails adjusted as appropriate  - Keep care items and personal belongings within reach  - Initiate and maintain comfort rounds  - Make Fall Risk Sign visible to staff  - Apply yellow socks and bracelet for high fall risk patients  - Consider moving patient to room near nurses station  Outcome: Progressing  Goal: Maintain or return to baseline ADL function  Description: INTERVENTIONS:  -  Assess patient's ability to carry out ADLs; assess patient's baseline for ADL function and identify physical deficits which impact ability to perform ADLs (bathing, care of mouth/teeth, toileting, grooming, dressing, etc )  - Assess/evaluate cause of self-care deficits   - Assess range of motion  - Assess patient's mobility; develop plan if impaired  - Assess patient's need for assistive devices and provide as appropriate  - Encourage maximum independence but intervene and supervise when necessary  - Involve family in performance of ADLs  - Assess for home care needs following discharge   - Consider OT consult to assist with ADL evaluation and planning for discharge  - Provide patient education as appropriate  Outcome: Progressing  Goal: Maintains/Returns to pre admission functional level  Description: INTERVENTIONS:  - Perform BMAT or MOVE assessment daily    - Set and communicate daily mobility goal to care team and patient/family/caregiver  - Collaborate with rehabilitation services on mobility goals if consulted  - Perform Range of Motion 4 times a day  - Reposition patient every 4 hours    - Dangle patient 4 times a day  - Stand patient 4 times a day  - Ambulate patient 4 times a day  - Out of bed to chair 4 times a day   - Out of bed for meals 4 times a day  - Out of bed for toileting  - Record patient progress and toleration of activity level   Outcome: Progressing     Problem: DISCHARGE PLANNING  Goal: Discharge to home or other facility with appropriate resources  Description: INTERVENTIONS:  - Identify barriers to discharge w/patient and caregiver  - Arrange for needed discharge resources and transportation as appropriate  - Identify discharge learning needs (meds, wound care, etc )  - Arrange for interpretive services to assist at discharge as needed  - Refer to Case Management Department for coordinating discharge planning if the patient needs post-hospital services based on physician/advanced practitioner order or complex needs related to functional status, cognitive ability, or social support system  Outcome: Progressing     Problem: Knowledge Deficit  Goal: Patient/family/caregiver demonstrates understanding of disease process, treatment plan, medications, and discharge instructions  Description: Complete learning assessment and assess knowledge base    Interventions:  - Provide teaching at level of understanding  - Provide teaching via preferred learning methods  Outcome: Progressing     Problem: CARDIOVASCULAR - ADULT  Goal: Maintains optimal cardiac output and hemodynamic stability  Description: INTERVENTIONS:  - Monitor I/O, vital signs and rhythm  - Monitor for S/S and trends of decreased cardiac output  - Administer and titrate ordered vasoactive medications to optimize hemodynamic stability  - Assess quality of pulses, skin color and temperature  - Assess for signs of decreased coronary artery perfusion  - Instruct patient to report change in severity of symptoms  Outcome: Progressing  Goal: Absence of cardiac dysrhythmias or at baseline rhythm  Description: INTERVENTIONS:  - Continuous cardiac monitoring, vital signs, obtain 12 lead EKG if ordered  - Administer antiarrhythmic and heart rate control medications as ordered  - Monitor electrolytes and administer replacement therapy as ordered  Outcome: Progressing     Problem: GENITOURINARY - ADULT  Goal: Urinary catheter remains patent  Description: INTERVENTIONS:  - Assess patency of urinary catheter  - If patient has a chronic morales, consider changing catheter if non-functioning  - Follow guidelines for intermittent irrigation of non-functioning urinary catheter  Outcome: Progressing     Problem: Prexisting or High Potential for Compromised Skin Integrity  Goal: Skin integrity is maintained or improved  Description: INTERVENTIONS:  - Identify patients at risk for skin breakdown  - Assess and monitor skin integrity  - Assess and monitor nutrition and hydration status  - Monitor labs   - Assess for incontinence   - Turn and reposition patient  - Assist with mobility/ambulation  - Relieve pressure over bony prominences  - Avoid friction and shearing  - Provide appropriate hygiene as needed including keeping skin clean and dry  - Evaluate need for skin moisturizer/barrier cream  - Collaborate with interdisciplinary team   - Patient/family teaching  - Consider wound care consult   Outcome: Progressing     Problem: Nutrition/Hydration-ADULT  Goal: Nutrient/Hydration intake appropriate for improving, restoring or maintaining nutritional needs  Description: Monitor and assess patient's nutrition/hydration status for malnutrition  Collaborate with interdisciplinary team and initiate plan and interventions as ordered  Monitor patient's weight and dietary intake as ordered or per policy  Utilize nutrition screening tool and intervene as necessary  Determine patient's food preferences and provide high-protein, high-caloric foods as appropriate       INTERVENTIONS:  - Monitor oral intake, urinary output, labs, and treatment plans  - Assess nutrition and hydration status and recommend course of action  - Evaluate amount of meals eaten  - Assist patient with eating if necessary   - Allow adequate time for meals  - Recommend/ encourage appropriate diets, oral nutritional supplements, and vitamin/mineral supplements  - Order, calculate, and assess calorie counts as needed  - Recommend, monitor, and adjust tube feedings and TPN/PPN based on assessed needs  - Assess need for intravenous fluids  - Provide specific nutrition/hydration education as appropriate  - Include patient/family/caregiver in decisions related to nutrition  Outcome: Progressing

## 2021-08-27 DIAGNOSIS — N32.89 BLADDER SPASM: ICD-10-CM

## 2021-08-27 LAB
ALBUMIN SERPL BCP-MCNC: 3.5 G/DL (ref 3–5.2)
ALP SERPL-CCNC: 98 U/L (ref 43–122)
ALT SERPL W P-5'-P-CCNC: 16 U/L
ANION GAP SERPL CALCULATED.3IONS-SCNC: 8 MMOL/L (ref 5–14)
AST SERPL W P-5'-P-CCNC: 27 U/L (ref 14–36)
BASOPHILS # BLD AUTO: 0 THOUSANDS/ΜL (ref 0–0.1)
BASOPHILS NFR BLD AUTO: 1 % (ref 0–1)
BILIRUB SERPL-MCNC: 0.39 MG/DL
BUN SERPL-MCNC: 54 MG/DL (ref 5–25)
CALCIUM SERPL-MCNC: 9 MG/DL (ref 8.4–10.2)
CHLORIDE SERPL-SCNC: 106 MMOL/L (ref 97–108)
CO2 SERPL-SCNC: 28 MMOL/L (ref 22–30)
CREAT SERPL-MCNC: 2.13 MG/DL (ref 0.6–1.2)
EOSINOPHIL # BLD AUTO: 0.2 THOUSAND/ΜL (ref 0–0.4)
EOSINOPHIL NFR BLD AUTO: 3 % (ref 0–6)
ERYTHROCYTE [DISTWIDTH] IN BLOOD BY AUTOMATED COUNT: 16.5 %
GFR SERPL CREATININE-BSD FRML MDRD: 25 ML/MIN/1.73SQ M
GLUCOSE SERPL-MCNC: 167 MG/DL (ref 70–99)
GLUCOSE SERPL-MCNC: 190 MG/DL (ref 65–140)
GLUCOSE SERPL-MCNC: 208 MG/DL (ref 65–140)
GLUCOSE SERPL-MCNC: 222 MG/DL (ref 65–140)
GLUCOSE SERPL-MCNC: 229 MG/DL (ref 65–140)
HCT VFR BLD AUTO: 26.5 % (ref 36–46)
HGB BLD-MCNC: 8.7 G/DL (ref 12–16)
LYMPHOCYTES # BLD AUTO: 1.7 THOUSANDS/ΜL (ref 0.5–4)
LYMPHOCYTES NFR BLD AUTO: 21 % (ref 25–45)
MAGNESIUM SERPL-MCNC: 1.7 MG/DL (ref 1.6–2.3)
MCH RBC QN AUTO: 31.5 PG (ref 26–34)
MCHC RBC AUTO-ENTMCNC: 33 G/DL (ref 31–36)
MCV RBC AUTO: 96 FL (ref 80–100)
MONOCYTES # BLD AUTO: 0.7 THOUSAND/ΜL (ref 0.2–0.9)
MONOCYTES NFR BLD AUTO: 9 % (ref 1–10)
NEUTROPHILS # BLD AUTO: 5.3 THOUSANDS/ΜL (ref 1.8–7.8)
NEUTS SEG NFR BLD AUTO: 67 % (ref 45–65)
PHOSPHATE SERPL-MCNC: 5.5 MG/DL (ref 2.5–4.8)
PLATELET # BLD AUTO: 210 THOUSANDS/UL (ref 150–450)
PMV BLD AUTO: 7.4 FL (ref 8.9–12.7)
POTASSIUM SERPL-SCNC: 4.2 MMOL/L (ref 3.6–5)
PROT SERPL-MCNC: 6.2 G/DL (ref 5.9–8.4)
RBC # BLD AUTO: 2.77 MILLION/UL (ref 4–5.2)
SODIUM SERPL-SCNC: 142 MMOL/L (ref 137–147)
WBC # BLD AUTO: 8 THOUSAND/UL (ref 4.5–11)

## 2021-08-27 PROCEDURE — 99232 SBSQ HOSP IP/OBS MODERATE 35: CPT | Performed by: FAMILY MEDICINE

## 2021-08-27 PROCEDURE — 83735 ASSAY OF MAGNESIUM: CPT | Performed by: FAMILY MEDICINE

## 2021-08-27 PROCEDURE — 82948 REAGENT STRIP/BLOOD GLUCOSE: CPT

## 2021-08-27 PROCEDURE — 85025 COMPLETE CBC W/AUTO DIFF WBC: CPT | Performed by: FAMILY MEDICINE

## 2021-08-27 PROCEDURE — 80053 COMPREHEN METABOLIC PANEL: CPT | Performed by: FAMILY MEDICINE

## 2021-08-27 PROCEDURE — 99232 SBSQ HOSP IP/OBS MODERATE 35: CPT | Performed by: INTERNAL MEDICINE

## 2021-08-27 PROCEDURE — 84100 ASSAY OF PHOSPHORUS: CPT | Performed by: FAMILY MEDICINE

## 2021-08-27 RX ORDER — TORSEMIDE 20 MG/1
20 TABLET ORAL
Status: DISCONTINUED | OUTPATIENT
Start: 2021-08-27 | End: 2021-08-29 | Stop reason: HOSPADM

## 2021-08-27 RX ORDER — OXYBUTYNIN CHLORIDE 10 MG/1
TABLET, EXTENDED RELEASE ORAL
Qty: 90 TABLET | Refills: 1 | Status: SHIPPED | OUTPATIENT
Start: 2021-08-27 | End: 2021-12-16

## 2021-08-27 RX ORDER — PROMETHAZINE HYDROCHLORIDE 25 MG/ML
25 INJECTION, SOLUTION INTRAMUSCULAR; INTRAVENOUS EVERY 12 HOURS PRN
Status: DISCONTINUED | OUTPATIENT
Start: 2021-08-27 | End: 2021-08-27

## 2021-08-27 RX ORDER — INSULIN GLARGINE 100 [IU]/ML
50 INJECTION, SOLUTION SUBCUTANEOUS EVERY 12 HOURS SCHEDULED
Status: DISCONTINUED | OUTPATIENT
Start: 2021-08-27 | End: 2021-08-29 | Stop reason: HOSPADM

## 2021-08-27 RX ORDER — INSULIN GLARGINE 100 [IU]/ML
40 INJECTION, SOLUTION SUBCUTANEOUS
Status: DISCONTINUED | OUTPATIENT
Start: 2021-08-27 | End: 2021-08-27

## 2021-08-27 RX ORDER — PROMETHAZINE HYDROCHLORIDE 25 MG/ML
25 INJECTION, SOLUTION INTRAMUSCULAR; INTRAVENOUS EVERY 12 HOURS PRN
Status: CANCELLED | OUTPATIENT
Start: 2021-08-27

## 2021-08-27 RX ORDER — PROMETHAZINE HYDROCHLORIDE 25 MG/ML
25 INJECTION, SOLUTION INTRAMUSCULAR; INTRAVENOUS EVERY 12 HOURS PRN
Status: DISCONTINUED | OUTPATIENT
Start: 2021-08-27 | End: 2021-08-29 | Stop reason: HOSPADM

## 2021-08-27 RX ORDER — INSULIN GLARGINE 100 [IU]/ML
40 INJECTION, SOLUTION SUBCUTANEOUS EVERY MORNING
Status: DISCONTINUED | OUTPATIENT
Start: 2021-08-27 | End: 2021-08-27

## 2021-08-27 RX ORDER — PROMETHAZINE HYDROCHLORIDE 25 MG/ML
25 INJECTION, SOLUTION INTRAMUSCULAR; INTRAVENOUS EVERY 12 HOURS PRN
Status: DISCONTINUED | OUTPATIENT
Start: 2021-08-27 | End: 2021-08-27 | Stop reason: SDUPTHER

## 2021-08-27 RX ADMIN — ISOSORBIDE MONONITRATE 30 MG: 30 TABLET, EXTENDED RELEASE ORAL at 09:18

## 2021-08-27 RX ADMIN — INSULIN LISPRO 2 UNITS: 100 INJECTION, SOLUTION INTRAVENOUS; SUBCUTANEOUS at 07:46

## 2021-08-27 RX ADMIN — CARVEDILOL 25 MG: 12.5 TABLET, FILM COATED ORAL at 07:41

## 2021-08-27 RX ADMIN — DOCUSATE SODIUM 100 MG: 100 CAPSULE, LIQUID FILLED ORAL at 17:34

## 2021-08-27 RX ADMIN — AMLODIPINE BESYLATE 5 MG: 5 TABLET ORAL at 09:18

## 2021-08-27 RX ADMIN — ONDANSETRON 4 MG: 4 TABLET, ORALLY DISINTEGRATING ORAL at 07:41

## 2021-08-27 RX ADMIN — ASPIRIN 81 MG: 81 TABLET, COATED ORAL at 09:18

## 2021-08-27 RX ADMIN — CLOPIDOGREL BISULFATE 75 MG: 75 TABLET ORAL at 09:18

## 2021-08-27 RX ADMIN — INSULIN GLARGINE 40 UNITS: 100 INJECTION, SOLUTION SUBCUTANEOUS at 09:18

## 2021-08-27 RX ADMIN — HEPARIN SODIUM 5000 UNITS: 5000 INJECTION INTRAVENOUS; SUBCUTANEOUS at 06:24

## 2021-08-27 RX ADMIN — DOCUSATE SODIUM 100 MG: 100 CAPSULE, LIQUID FILLED ORAL at 09:18

## 2021-08-27 RX ADMIN — HEPARIN SODIUM 5000 UNITS: 5000 INJECTION INTRAVENOUS; SUBCUTANEOUS at 14:30

## 2021-08-27 RX ADMIN — ATORVASTATIN CALCIUM 40 MG: 40 TABLET, FILM COATED ORAL at 17:34

## 2021-08-27 RX ADMIN — TORSEMIDE 20 MG: 20 TABLET ORAL at 16:29

## 2021-08-27 RX ADMIN — CARVEDILOL 25 MG: 12.5 TABLET, FILM COATED ORAL at 16:29

## 2021-08-27 RX ADMIN — INSULIN GLARGINE 50 UNITS: 100 INJECTION, SOLUTION SUBCUTANEOUS at 21:05

## 2021-08-27 RX ADMIN — INSULIN LISPRO 4 UNITS: 100 INJECTION, SOLUTION INTRAVENOUS; SUBCUTANEOUS at 16:28

## 2021-08-27 RX ADMIN — INSULIN LISPRO 4 UNITS: 100 INJECTION, SOLUTION INTRAVENOUS; SUBCUTANEOUS at 11:59

## 2021-08-27 RX ADMIN — HEPARIN SODIUM 5000 UNITS: 5000 INJECTION INTRAVENOUS; SUBCUTANEOUS at 21:05

## 2021-08-27 RX ADMIN — CEFTRIAXONE 1000 MG: 1 INJECTION, SOLUTION INTRAVENOUS at 21:14

## 2021-08-27 RX ADMIN — Medication 2000 UNITS: at 09:18

## 2021-08-27 RX ADMIN — LEVOTHYROXINE SODIUM 50 MCG: 50 TABLET ORAL at 06:24
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
## 2021-08-27 NOTE — PROGRESS NOTES
Progress Note    Adan Ragland 64 y o  female MRN: [id number]  Unit/Bed#: 7T [facility] 708-01 Encounter: [id number]  Admitting Physician: Néstor Flores MD  PCP: CHERELLE Hernandez  Date of Admission:  8/23/2021  8:23 PM    Assessment and Plan    * New onset of congestive heart failure ([address] )  Assessment & Plan  Wt Readings from Last 3 Encounters:   08/27/21 133 kg (292 lb 15 9 oz)   08/03/21 132 kg (290 lb)   08/03/21 132 kg (291 lb)     Likely secondary to UTI vs excessive fluid intake vs discontinuation of diuretic therapy due to worsening kidney function   Dry weight:  132 kg  Echo this admission :Ejection fraction was estimated to be 45 %  There were regional wall motion abnormalities  Home meds:  Carvedilol 25 mg b i d , amlodipine 5 mg daily, isosorbide mononitrate 30 mg daily    TSH (August 2021) normal Troponin:  Negative x3  EKG:  NSR, LBBB CXR:  Unremarkable  NYHA Functional Class:  III    Bicarb increasing indicating contraction alkalosis which is appropriate    Creatinine decreasing in the setting of diuretic use  Approaching her normative weight  Plan to:   - Continue Telemetry  - Supplemental O2 to maintain saturation > 92%  - Continue Lasix 40 mg IV BID  Per cardiology recommendation ->will switch to furosemide 20mg BID starting this pm per cardiology   - Fluid restriction @ 1200 cc/day and low salt diet  - Strict I&O and Daily Weights   - Continue other home medications  - Will likely plan for a catherization to be done this admission once patient gets close to her baseline dry weight which will likely be on Monday, will plan to transfer patient to [place] on Sunday   -NPO Sunday night   - hold diuretic in am of catherization ( Monday morning)     - CBC, CMP, Mg, Phos in am        CAD (coronary artery disease)  Assessment & Plan  Status post PCI x5 with stents placed in 2012 and 2017 Omar Robertson)  History of abnormal EKG reported by patient in the past  (no baseline EKG seen on file)  Troponin negative x 2  EKG:  NSR, LBBB    Plan  -GTN 0 4 mg sublingual p r n ilure  - continue amlodipine 5 mg daily, isosorbide mononitrate 30 mg daily, clopidogrel 75 mg daily, aspirin 81 mg daily, Metoprolol 25 mg daily   -cardiology recommend heart cath after diureses is complete    CKD (chronic kidney disease) stage 4, GFR 15-29 ml/min [facility])  Assessment & Plan  Lab Results   Component Value Date    EGFR 23 (L) 08/26/2021    EGFR 21 (L) 08/25/2021    EGFR 22 (L) 08/24/2021    CREATININE 2 31 (H) 08/26/2021    CREATININE 2 41 (H) 08/25/2021    CREATININE 2 35 (H) 08/24/2021     Baseline creatinine 2 3-2 6 from May and 1 6-1 7 from Augste 2020 UA which is [facility] specimen shows 20 to 30 RBCs/30 to 50 WBCs, innumerable bacteria, 3+ proteinuria  Patient had  Detailed discussion with nephrology and cardiology  regarding risk of ALFRED with contrast exposure and small risk of dialysis is she undergoes catheterization  and verbalized understanding  Nephrology colleagues states she will be ready for cath on Monday and recommend holding diuretics on that day  Plan     -Will avoid all nephrotoxic medications and procedures as possible   -Will continue diuresis given CHF exacerbation with Lasix IV 40 mg BID --> will switch to PO this afternoon  -closely monitor renal function while aggressively diuresing   -avoid ACE-inhibitor or ARB for time being   -Monitor urine output closely  Type 2 diabetes mellitus with diabetic polyneuropathy, with long-term current use of insulin [facility])  Assessment & Plan  Lab Results   Component Value Date    HGBA1C 7 1 (H) 08/03/2021       Recent Labs     08/26/21  1101 08/26/21  1545 08/26/21 2008 08/27/21  0537   POCGLU 253* 185* 224* 190*       Blood Sugar Average: Last 72 hrs:  (P) 213 [id number]   POA glucose 57  On admission  Home regiment include  Lantus 50 units b i d , Humalog 10 units t i  d  Recently more compliant with home medication explaining the rapid  change in her A1C levels  Thoughts  that her insulin requirements might over estimate her current needs while patient is complaint  Patient still received 16 units of correctional on top  of her regimented change yesterday  Plan     - increase lantus to 40 units BID   - Continue SSI   - Insulin Sliding Scale @ weight based Algorithm 3 with accuchecks ACHS  - Diabetic Diet with card restrcition level 3      Prolonged QT interval  Assessment & Plan  Mild at 486  Will continue to monitor and replete electrolytes as necessary  Will continue to monitor on telemetry  Hypocalcemia  Assessment & Plan  Lab Results   Component Value Date    CALCIUM 8 9 08/25/2021    PHOS 5 1 (H) 08/24/2021      PTH levels elevated 107 and Vit D at 23 6  This indicates hypocalcemia likely  In the setting of secondary hypoparathyroidism as a result of chronic kidney disease      Plan  Will  Start Vitamin D3 2000 units daily   -will continue to monitor creatinine  -will continue to follow on morning CMP       Neurogenic bladder  Assessment & Plan  Suspected secondary to diabetes  Has suprapubic catheter placed in June 2021 changed Qmonthly  Last changed 08/03/2021 by [facility]  Catheter site clean with no dressing  Urinary tract infection associated with cystostomy catheter [facility])  Assessment & Plan  Urinalysis positive for blood, nitrates, white blood cells and bacteria  Cultures were positive for >100,000 cfu/ml Klebsiella susceptible to Ceftriaxone     Plan     Continue ceftriaxone 1 g IV daily  Urology recommends maintain ABX until morales can be replaced which [facility] occur at the [facility]  Will touch base with them as patient is likely to remain here for the weekend and obtain recs for abx and catheter management  HTN (hypertension)  Assessment & Plan  Blood Pressure: 149/79  currently not at goal (JNC 8:  BP less than 130/80)    Plan     Will continue amlodipine 5 mg daily and IV Lasix 40 mg BID daily    Low-salt diet  Monitor blood pressure t i d     Normochromic normocytic anemia  Assessment & Plan  Normocytic normochromic  Likely secondary to CKD  However no prior colonoscopy done  Labs were significant for low iron levels at 26 and TIBC at 234  Plan   Will continue to follow CBC   Ferrous sulfate 325mg every other day   Follow up on FOBT   Low threshold for blood transfusion with hemoglobin less than 8  Will recommend colonoscopy to be done outpatient       VTE Pharmacologic Prophylaxis:   Pharmacologic: Heparin  Mechanical VTE Prophylaxis in Place: Yes    Patient Centered Rounds: I have performed bedside rounds with nursing staff today  Discussions with Specialists or Other Care Team Provider:  Cardiology, Nephrology  Education and Discussions with Family / Patient:  Had extensive talk with the patient regarding treatment plan for this hospital stay  She seems to be agreeable and understandable to the current treatment plan  Time Spent for Care: 30 minutes  More than 50% of total time spent on counseling and coordination of care as described above  Current Length of Stay: 4 day(s)    Current Patient Status: Inpatient   Certification Statement: The patient will continue to require additional inpatient hospital stay due to Continual  diuresis as well as pending cath lab on Monday    Discharge Plan: Will likely be transferred to [place]  for cath lab procedure on Monday     Code Status: Level 1 - Full Code      Subjective:   Patient reports to be feeling overall better  She states that she has some nausea that is relieved with Zofran  Patient reports that she is nervous regarding her current medical condition    All information regarding her treatment    Objective:     Vitals:   Temp (24hrs), Av 3 °F (36 3 °C), Min:96 8 °F (36 °C), Max:97 6 °F (36 4 °C)    Temp:  [96 8 °F (36 °C)-97 6 °F (36 4 °C)] 97 6 °F (36 4 °C)  HR:  [63-72] 72  Resp:  [18] 18  BP: (123-168)/(61-92) 168/92  SpO2:  [91 %-97 %] 97 %  Body mass index is 44 55 kg/m²  Input and Output Summary (last 24 hours): Intake/Output Summary (Last 24 hours) at 8/27/2021 1353  Last data filed at 8/27/2021 0900  Gross per 24 hour   Intake 360 ml   Output 2000 ml   Net -1640 ml       Physical Exam:     Physical Exam  Vitals and nursing note reviewed  Constitutional:       General: She is not in acute distress  Appearance: Normal appearance  She is obese  She is not ill-appearing, toxic-appearing or diaphoretic  HENT:      Head: Normocephalic  Nose: Nose normal  No congestion or rhinorrhea  Mouth/Throat:      Mouth: Mucous membranes are moist       Pharynx: Oropharynx is clear  No oropharyngeal exudate or posterior oropharyngeal erythema  Eyes:      General: No scleral icterus  Right eye: No discharge  Left eye: No discharge  Conjunctiva/sclera: Conjunctivae normal    Cardiovascular:      Rate and Rhythm: Normal rate and regular rhythm  Pulses: Normal pulses  Heart sounds: Normal heart sounds  No murmur heard  No friction rub  No gallop  Pulmonary:      Effort: Pulmonary effort is normal  No respiratory distress  Breath sounds: Normal breath sounds  No stridor  No wheezing, rhonchi or rales  Chest:      Chest wall: No tenderness  Abdominal:      General: Bowel sounds are normal  There is no distension  Palpations: Abdomen is soft  There is no mass  Tenderness: There is no abdominal tenderness  There is no guarding or rebound  Hernia: A hernia (Umbilical hernia noted) is present  Musculoskeletal:         General: No tenderness, deformity or signs of injury  Right lower leg: No tenderness  3+ Pitting Edema present  Left lower leg: No tenderness  3+ Pitting Edema present  Legs:       Comments: Bilateral pitting edema to the level of the knee  Erythematous patch of skin noted on right shin  Feet:      Right foot:      Skin integrity: No ulcer  Left foot:      Skin integrity: No ulcer  Skin:     General: Skin is warm  Coloration: Skin is not jaundiced or pale  Findings: No bruising or lesion  Neurological:      General: No focal deficit present  Mental Status: She is alert  Psychiatric:         Mood and Affect: Mood normal          Behavior: Behavior normal            Labs:    Results from last 7 days   Lab Units 08/27/21  0454   WBC Thousand/uL 8 00   HEMOGLOBIN g/dL 8 7*   HEMATOCRIT % 26 5*   PLATELETS Thousands/uL 210   NEUTROS PCT % 67*   LYMPHS PCT % 21*   MONOS PCT % 9   EOS PCT % 3     Results from last 7 days   Lab Units 08/27/21  0454   POTASSIUM mmol/L 4 2   CHLORIDE mmol/L 106   CO2 mmol/L 28   BUN mg/dL 54*   CREATININE mg/dL 2 13*   CALCIUM mg/dL 9 0   ALK PHOS U/L 98   ALT U/L 16   AST U/L 27     Results from last 7 days   Lab Units 08/23/21  2046   INR  1 03     Results from last 7 days   Lab Units 08/27/21  1118 08/27/21  0537 08/26/21  2008 08/26/21  1545 08/26/21  1101 08/26/21  0555 08/25/21  2022 08/25/21  1603 08/25/21  1118 08/25/21  0538 08/24/21  1958 08/24/21  1558   POC GLUCOSE mg/dl 222* 190* 224* 185* 253* 252* 260* 240* 204* 151* 212* 251*             * I Have Reviewed All Lab Data Listed Above  * Additional Pertinent Lab Tests Reviewed:  [facility] Admission Reviewed    Imaging:    Imaging Reports Reviewed Today Include: none     Recent Cultures (last 7 days):     Results from last 7 days   Lab Units 08/23/21 2051   URINE CULTURE  >100,000 cfu/ml Klebsiella oxytoca*  10,000-19,000 cfu/ml Corynebacterium striatum group*  10,000-19,000 cfu/ml Corynebacterium amycolatum*       Last 24 Hours Medication List:   Current Facility-Administered Medications   Medication Dose Route Frequency Provider Last Rate    amLODIPine  5 mg Oral Daily Jose Soares MD      aspirin  81 mg Oral Daily Catarino Hernandez MD      atorvastatin  40 mg Oral After Dinner Rogers Rinne, MD  carvedilol  25 mg Oral BID With Meals Payton Rosen MD      cefTRIAXone  1,000 mg Intravenous Q24H Suzy Toney MD 1,000 mg (08/26/21 2205)    cholecalciferol  2,000 Units Oral Daily Ana Roque MD      clopidogrel  75 mg Oral Daily Catarino Robertson MD      docusate sodium  100 mg Oral BID Catarino Robertson MD      ferrous sulfate  325 mg Oral Every Other Day Payton Rosen MD      heparin (porcine)  5,000 Units Subcutaneous Q8H [facility] Catarino Robertson MD      HYDROcodone-acetaminophen  1 tablet Oral BID PRN Payton Rosen MD      insulin glargine  40 Units Subcutaneous HS Payton Fulton MD      insulin glargine  40 Units Subcutaneous QAM Payton Fulton MD      insulin lispro  2-12 Units Subcutaneous TID AC Chris Phillip MD      isosorbide mononitrate  30 mg Oral Daily Catarino Robertson MD      levothyroxine  50 mcg Oral Daily Catarino Robertson MD      nitroglycerin  0 4 mg Sublingual Q5 Min PRN Catarino Robertson MD      ondansetron  4 mg Oral Q6H PRN Payton Rosen MD      promethazine  25 mg Intravenous Q12H PRN Candice Pearl MD      torsemide  20 mg Oral BID (diuretic) Krystal Cazares DO          ** Please Note: Dictation voice to text software may have been used in the creation of this document   **    Payton Rosen MD  08/27/21  1:53 PM

## 2021-08-27 NOTE — PROGRESS NOTES
NEPHROLOGY PROGRESS NOTE   Norbert Overton 64 y o  female MRN: [id number]  Unit/Bed#: 7T [facility] 708-01 Encounter: [id number]  Reason for Consult: CKD    ASSESSMENT AND PLAN:  Patient is 16-year-old female with significant medical issues of diabetes for more than 20 years, hypertension for many years, progressive CKD stage 4, neurogenic bladder, status post suprapubic catheter placed, combined CHF, hyperlipidemia, CAD, status post PTCA, presented with shortness of breath, weight gain   We are consulted for CKD management      Progressive CKD stage 4, baseline creatinine 2 3 to 2 6 since May 2021, prior 1 6 to 1 7 since August 2020, follows with Dr Roach  -last creatinine 2 1 overall stable at baseline    -CKD suspect in the setting of uncontrolled diabetes for long-term, hypertension, in the setting of morbidly obese  -UA which is SPC specimen shows 20 to 30 RBCs/30 to 50 WBCs, innumerable bacteria, 3+ proteinuria  -avoid nephrotoxins or NSAIDs   -tentative plan for cardiac catheterization on Monday noted  -hold diuretic on the day of cardiac catheterization   -if her volume status continue to improve over the weekend, would like to prep her with very gentle IV fluid total 500 mL at 50 mL/hour 6 hours prior to IV contrast with cardiac catheterization and continue for at least 4 hours afterwards starting Sunday night   -closely monitor renal function while aggressively diuresing   -renal ultrasound in May 2021 shows normal size kidneys, normal echogenicity, no hydronephrosis or stones      Hypertension  -outpatient regimen includes amlodipine 5 mg daily, Coreg 25 mg p o  B i d , Imdur 30 mg daily   -continue same regimen   -blood pressure fluctuating   Continue to monitor with aggressive diuresis as also suspect volume mediated component contributing to elevated BP   -goal SBP 130s  -avoiding ACE-inhibitor or ARB for time being given progressive CKD and while aggressively diuresing with anticipated cardiac catheterization     Nephrotic range proteinuria  -last UPC ratio 5 7 g in August 2021   -suspect in the setting of prior uncontrolled diabetes for many years, 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 morbid obesity  -last hemoglobin A1c 7 1 although prior values have been anywhere from 8 to 14 going back to 2019  -avoiding Ace inhibitor or ARB for time being although this may need to be considered in the future as outpatient once renal function remains stable   -serum immunofixation shows no monoclonal gammopathy   UPEP negative     Combined CHF, echo shows EF 66%, grade 2 diastolic dysfunction, normal IVC  -status post IV diuresis with good response, weight reducing  Now switched to torsemide 20 mg p o  b i d  as per Cardiology    -daily standing weight, accurate intake and output   -goal to keep negative balancea      Anemia in CKD, hemoglobin below goal, transfuse p r n  For hemoglobin less than seven  Continue to closely monitor, iron saturation 20% with ferritin 44      Neurogenic bladder, status post suprapubic catheter placement       Discussed above plan in detail with primary team     SUBJECTIVE:  Patient seen and examined at bedside  Shortness of breath seems to be overall improving  Denies chest pain, nausea or vomiting        OBJECTIVE:  Current Weight: Weight - Scale: 133 kg (292 lb 15 9 oz)  Vitals:    08/27/21 1457   BP: 157/72   Pulse: 69   Resp: 18   Temp: 97 6 °F (36 4 °C)   SpO2: 95%       Intake/Output Summary (Last 24 hours) at 8/27/2021 1930  Last data filed at 8/27/2021 1700  Gross per 24 hour   Intake 360 ml   Output 2000 ml   Net -1640 ml     Wt Readings from Last 3 Encounters:   08/27/21 133 kg (292 lb 15 9 oz)   08/03/21 132 kg (290 lb)   08/03/21 132 kg (291 lb)     Temp Readings from Last 3 Encounters:   08/27/21 97 6 °F (36 4 °C) (Temporal)   08/03/21 (!) 97 1 °F (36 2 °C) (Temporal)   07/13/21 97 6 °F (36 4 °C) (Temporal)     BP Readings from Last 3 Encounters:   08/27/21 157/72   08/03/21 160/80   08/03/21 160/90     Pulse Readings from Last 3 Encounters:   08/27/21 69   08/03/21 81   08/03/21 92        Physical Examination:  General:  Sitting in bed, no acute distress   Eyes:  Mild conjunctival pallor present  ENT:  External examination of ears and nose unremarkable  Neck:  No obvious lymphadenopathy appreciated  Respiratory:  Bilateral air entry present  CVS:  S1, S2 present  GI:  Soft, nondistended  CNS:  Active alert oriented x3  Skin:  No new rash in legs  Musculoskeletal:  No obvious new gross deformity noted    Medications:    Current Facility-Administered Medications:     amLODIPine (NORVASC) tablet 5 mg, 5 mg, Oral, Daily, Heidi Aguirre MD, 5 mg at 08/27/21 7842    aspirin (ECOTRIN LOW STRENGTH) EC tablet 81 mg, 81 mg, Oral, Daily, Whitney Phillip MD, 81 mg at 08/27/21 4959    atorvastatin (LIPITOR) tablet 40 mg, 40 mg, Oral, After Dinner, Radha Cole MD, 40 mg at 08/27/21 1734    carvedilol (COREG) tablet 25 mg, 25 mg, Oral, BID With Meals, Payton Fulton MD, 25 mg at 08/27/21 1629    cefTRIAXone (ROCEPHIN) IVPB (premix in dextrose) 1,000 mg 50 mL, 1,000 mg, Intravenous, Q24H, Whitney Phillip MD, Last Rate: 100 mL/hr at 08/26/21 2205, 1,000 mg at 08/26/21 2205    cholecalciferol (VITAMIN D3) tablet 2,000 Units, 2,000 Units, Oral, Daily, Efrain Whitfield MD, 2,000 Units at 08/27/21 0918    clopidogrel (PLAVIX) tablet 75 mg, 75 mg, Oral, Daily, Whitney Phillip MD, 75 mg at 08/27/21 0918    docusate sodium (COLACE) capsule 100 mg, 100 mg, Oral, BID, Whitney Phillip MD, 100 mg at 08/27/21 1734    ferrous sulfate tablet 325 mg, 325 mg, Oral, Every Other Day, Payton Fulton MD, 325 mg at 08/26/21 0905    heparin (porcine) subcutaneous injection 5,000 Units, 5,000 Units, Subcutaneous, Q8H [facility], Calhoun Ashley MD Marjan, 5,000 Units at 08/27/21 1430    HYDROcodone-acetaminophen (NORCO) 5-325 mg per tablet 1 tablet, 1 tablet, Oral, BID PRN, Payton Pruett MD, 1 tablet at 08/26/21 1307    insulin glargine (LANTUS) subcutaneous injection 40 Units 0 4 mL, 40 Units, Subcutaneous, HS, Payton Fulton MD    insulin glargine (LANTUS) subcutaneous injection 40 Units 0 4 mL, 40 Units, Subcutaneous, QAM, Payton Pruett MD, 40 Units at 08/27/21 0918    insulin lispro (HumaLOG) 100 units/mL subcutaneous injection 2-12 Units, 2-12 Units, Subcutaneous, TID AC, 4 Units at 08/27/21 1628 **AND** Fingerstick Glucose (POCT), , , 4x Daily AC and at bedtime, Catarino Swain MD    isosorbide mononitrate (IMDUR) 24 hr tablet 30 mg, 30 mg, Oral, Daily, Maida Phillip MD, 30 mg at 08/27/21 0194    levothyroxine tablet 50 mcg, 50 mcg, Oral, Daily, Theodore Webber MD, 50 mcg at 08/27/21 0624    nitroglycerin (NITROSTAT) SL tablet 0 4 mg, 0 4 mg, Sublingual, Q5 Min PRN, Maida Phillip MD    ondansetron (ZOFRAN-ODT) dispersible tablet 4 mg, 4 mg, Oral, Q6H PRN, Payton Pruett MD, 4 mg at 08/27/21 0741    promethazine (PHENERGAN) injection 25 mg, 25 mg, Intravenous, Q12H PRN, Lise Gold MD    torsemide [facility]) tablet 20 mg, 20 mg, Oral, BID (diuretic), Manus Larve, DO, 20 mg at 08/27/21 1629    Laboratory Results:  Results from last 7 days   Lab Units 08/27/21  0454 08/26/21  0444 08/25/21  0507 08/24/21  0430 08/23/21  2046   WBC Thousand/uL 8 00 8 30 8 20 9 60 11 00   HEMOGLOBIN g/dL 8 7* 9 0* 8 7* 8 5* 8 6*   HEMATOCRIT % 26 5* 26 7* 26 0* 25 1* 25 7*   PLATELETS Thousands/uL 210 220 213 220 230   SODIUM mmol/L 142 140 141 139 141   POTASSIUM mmol/L 4 2 4 5 4 8 4 5 4 9   CHLORIDE mmol/L 106 106 109* 109* 111*   CO2 mmol/L 28 24 26 22 22   BUN mg/dL 54* 56* 57* 57* 56*   CREATININE mg/dL 2 13* 2 31* 2 41* 2 35* 2 37*   CALCIUM mg/dL 9 0 8 9 8 9 8 3* 8 3*   MAGNESIUM mg/dL 1 7 1 9  --  2 2 2 0   PHOSPHORUS mg/dL 5 5* 5 6*  --  5 1*  --        XR chest portable   Final Result by Linda More Meghan Ribera MD (08/24 1499)      Small left pleural effusion  Workstation performed: [id number]             Portions of the record may have been created with voice recognition software  Occasional wrong word or "sound a like" substitutions may have occurred due to the inherent limitations of voice recognition software  Read the chart carefully and recognize, using context, where substitutions have occurred

## 2021-08-28 LAB
ALBUMIN SERPL BCP-MCNC: 3.6 G/DL (ref 3–5.2)
ALP SERPL-CCNC: 101 U/L (ref 43–122)
ALT SERPL W P-5'-P-CCNC: 15 U/L
ANION GAP SERPL CALCULATED.3IONS-SCNC: 9 MMOL/L (ref 5–14)
AST SERPL W P-5'-P-CCNC: 28 U/L (ref 14–36)
BASOPHILS # BLD AUTO: 0 THOUSANDS/ΜL (ref 0–0.1)
BASOPHILS NFR BLD AUTO: 1 % (ref 0–1)
BILIRUB SERPL-MCNC: 0.4 MG/DL
BUN SERPL-MCNC: 54 MG/DL (ref 5–25)
CALCIUM SERPL-MCNC: 8.9 MG/DL (ref 8.4–10.2)
CHLORIDE SERPL-SCNC: 105 MMOL/L (ref 97–108)
CO2 SERPL-SCNC: 28 MMOL/L (ref 22–30)
CREAT SERPL-MCNC: 2.22 MG/DL (ref 0.6–1.2)
EOSINOPHIL # BLD AUTO: 0.3 THOUSAND/ΜL (ref 0–0.4)
EOSINOPHIL NFR BLD AUTO: 3 % (ref 0–6)
ERYTHROCYTE [DISTWIDTH] IN BLOOD BY AUTOMATED COUNT: 16.9 %
GFR SERPL CREATININE-BSD FRML MDRD: 24 ML/MIN/1.73SQ M
GLUCOSE SERPL-MCNC: 107 MG/DL (ref 70–99)
GLUCOSE SERPL-MCNC: 124 MG/DL (ref 65–140)
GLUCOSE SERPL-MCNC: 138 MG/DL (ref 65–140)
GLUCOSE SERPL-MCNC: 161 MG/DL (ref 65–140)
GLUCOSE SERPL-MCNC: 208 MG/DL (ref 65–140)
HCT VFR BLD AUTO: 28.3 % (ref 36–46)
HGB BLD-MCNC: 9.2 G/DL (ref 12–16)
LYMPHOCYTES # BLD AUTO: 2.1 THOUSANDS/ΜL (ref 0.5–4)
LYMPHOCYTES NFR BLD AUTO: 22 % (ref 25–45)
MAGNESIUM SERPL-MCNC: 1.7 MG/DL (ref 1.6–2.3)
MCH RBC QN AUTO: 31.7 PG (ref 26–34)
MCHC RBC AUTO-ENTMCNC: 32.4 G/DL (ref 31–36)
MCV RBC AUTO: 98 FL (ref 80–100)
MONOCYTES # BLD AUTO: 1 THOUSAND/ΜL (ref 0.2–0.9)
MONOCYTES NFR BLD AUTO: 11 % (ref 1–10)
NEUTROPHILS # BLD AUTO: 6.2 THOUSANDS/ΜL (ref 1.8–7.8)
NEUTS SEG NFR BLD AUTO: 64 % (ref 45–65)
PHOSPHATE SERPL-MCNC: 5.4 MG/DL (ref 2.5–4.8)
PLATELET # BLD AUTO: 219 THOUSANDS/UL (ref 150–450)
PMV BLD AUTO: 7.5 FL (ref 8.9–12.7)
POTASSIUM SERPL-SCNC: 4 MMOL/L (ref 3.6–5)
PROT SERPL-MCNC: 6.4 G/DL (ref 5.9–8.4)
RBC # BLD AUTO: 2.89 MILLION/UL (ref 4–5.2)
SODIUM SERPL-SCNC: 142 MMOL/L (ref 137–147)
WBC # BLD AUTO: 9.6 THOUSAND/UL (ref 4.5–11)

## 2021-08-28 PROCEDURE — 84100 ASSAY OF PHOSPHORUS: CPT

## 2021-08-28 PROCEDURE — 99232 SBSQ HOSP IP/OBS MODERATE 35: CPT | Performed by: FAMILY MEDICINE

## 2021-08-28 PROCEDURE — 82948 REAGENT STRIP/BLOOD GLUCOSE: CPT

## 2021-08-28 PROCEDURE — 99232 SBSQ HOSP IP/OBS MODERATE 35: CPT | Performed by: PHYSICIAN ASSISTANT

## 2021-08-28 PROCEDURE — 85025 COMPLETE CBC W/AUTO DIFF WBC: CPT | Performed by: FAMILY MEDICINE

## 2021-08-28 PROCEDURE — 80053 COMPREHEN METABOLIC PANEL: CPT | Performed by: FAMILY MEDICINE

## 2021-08-28 PROCEDURE — 83735 ASSAY OF MAGNESIUM: CPT

## 2021-08-28 RX ORDER — CEFTRIAXONE 1 G/50ML
1000 INJECTION, SOLUTION INTRAVENOUS EVERY 24 HOURS
Status: DISCONTINUED | OUTPATIENT
Start: 2021-08-28 | End: 2021-08-29 | Stop reason: HOSPADM

## 2021-08-28 RX ORDER — DICYCLOMINE HYDROCHLORIDE 10 MG/1
10 CAPSULE ORAL
Status: DISCONTINUED | OUTPATIENT
Start: 2021-08-28 | End: 2021-08-29 | Stop reason: HOSPADM

## 2021-08-28 RX ORDER — SACCHAROMYCES BOULARDII 250 MG
250 CAPSULE ORAL 2 TIMES DAILY
Status: DISCONTINUED | OUTPATIENT
Start: 2021-08-28 | End: 2021-08-29 | Stop reason: HOSPADM

## 2021-08-28 RX ADMIN — ASPIRIN 81 MG: 81 TABLET, COATED ORAL at 08:19

## 2021-08-28 RX ADMIN — ISOSORBIDE MONONITRATE 30 MG: 30 TABLET, EXTENDED RELEASE ORAL at 08:21

## 2021-08-28 RX ADMIN — Medication 250 MG: at 21:47

## 2021-08-28 RX ADMIN — HEPARIN SODIUM 5000 UNITS: 5000 INJECTION INTRAVENOUS; SUBCUTANEOUS at 15:30

## 2021-08-28 RX ADMIN — LEVOTHYROXINE SODIUM 50 MCG: 50 TABLET ORAL at 05:45

## 2021-08-28 RX ADMIN — ONDANSETRON 4 MG: 4 TABLET, ORALLY DISINTEGRATING ORAL at 08:19

## 2021-08-28 RX ADMIN — Medication 2000 UNITS: at 08:21

## 2021-08-28 RX ADMIN — DICYCLOMINE HYDROCHLORIDE 10 MG: 10 CAPSULE ORAL at 21:47

## 2021-08-28 RX ADMIN — TORSEMIDE 20 MG: 20 TABLET ORAL at 16:17

## 2021-08-28 RX ADMIN — CLOPIDOGREL BISULFATE 75 MG: 75 TABLET ORAL at 08:21

## 2021-08-28 RX ADMIN — DOCUSATE SODIUM 100 MG: 100 CAPSULE, LIQUID FILLED ORAL at 08:21

## 2021-08-28 RX ADMIN — INSULIN GLARGINE 50 UNITS: 100 INJECTION, SOLUTION SUBCUTANEOUS at 21:05

## 2021-08-28 RX ADMIN — CEFTRIAXONE 1000 MG: 1 INJECTION, SOLUTION INTRAVENOUS at 21:03

## 2021-08-28 RX ADMIN — FERROUS SULFATE TAB 325 MG (65 MG ELEMENTAL FE) 325 MG: 325 (65 FE) TAB at 08:21

## 2021-08-28 RX ADMIN — HEPARIN SODIUM 5000 UNITS: 5000 INJECTION INTRAVENOUS; SUBCUTANEOUS at 21:05

## 2021-08-28 RX ADMIN — ONDANSETRON 4 MG: 4 TABLET, ORALLY DISINTEGRATING ORAL at 19:50

## 2021-08-28 RX ADMIN — ATORVASTATIN CALCIUM 40 MG: 40 TABLET, FILM COATED ORAL at 18:36

## 2021-08-28 RX ADMIN — INSULIN LISPRO 2 UNITS: 100 INJECTION, SOLUTION INTRAVENOUS; SUBCUTANEOUS at 16:18

## 2021-08-28 RX ADMIN — HEPARIN SODIUM 5000 UNITS: 5000 INJECTION INTRAVENOUS; SUBCUTANEOUS at 05:45

## 2021-08-28 RX ADMIN — AMLODIPINE BESYLATE 5 MG: 5 TABLET ORAL at 08:21

## 2021-08-28 RX ADMIN — CARVEDILOL 25 MG: 12.5 TABLET, FILM COATED ORAL at 08:21

## 2021-08-28 RX ADMIN — DOCUSATE SODIUM 100 MG: 100 CAPSULE, LIQUID FILLED ORAL at 18:36

## 2021-08-28 RX ADMIN — TORSEMIDE 20 MG: 20 TABLET ORAL at 08:21

## 2021-08-28 RX ADMIN — INSULIN GLARGINE 50 UNITS: 100 INJECTION, SOLUTION SUBCUTANEOUS at 08:20

## 2021-08-28 RX ADMIN — CARVEDILOL 25 MG: 12.5 TABLET, FILM COATED ORAL at 16:17

## 2021-08-28 NOTE — QUICK NOTE
I examined the patient at bedside and she was laying comfortably in the bed, with no complains  Patient denies chest pain or SOB  Cardiac and pulmonary auscultation were unremarkable, LLE and RLE are +1/+4  She hasn't tried to sit in the chair today and agrees with the plan of trying to do it tomorrow  I modified her Lantus insulin regimen from 40 UI BID to 50 UI BID, since she has used 10 UI of SS today and her glycemia continues to be elevated  First dose of oral Torsemide was today in the afternoon  She is aware that will likely be transferred to [place] on Sunday

## 2021-08-28 NOTE — PROGRESS NOTES
NEPHROLOGY PROGRESS NOTE   Rosa Scott 64 y o  female MRN: [id number]  Unit/Bed#: 7T [facility] 708-01 Encounter: [id number]      ASSESSMENT/PLAN:  1  CKD stage 4:  Baseline creatinine 2 3-2 6 since May 2021 and follows with Dr Low Covarrubias   · Creatinine today is 2 2 which is stable and in her baseline  · CKD due to diabetes, hypertension and obesity  · Plan for cardiac catheterization on Monday  · Patient aware of the risk of contrast induced nephropathy  · Recommend holding diuretics on Monday  · Will start gentle IV fluids on Sunday night  · Check a m  BMP  2  Hypertension:  Blood pressure acceptable  3  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:  Status post IV diuretics and now on torsemide 20 mg p o  B i d  Per Cardiology  · Getting cardiac catheterization on Monday  4  Anemia chronic disease:  Hemoglobin stable at 9 2  5  Neurogenic bladder:  Status post suprapubic catheter  6  Nephrotic range proteinuria:  Due to diabetic nephropathy and possible FSGS in the setting of obesity  Workup without monoclonal gammopathy  7  History CAD status post PCI x5 in 2012 and 2017    Plan Summary:    Cardiac cath Monday--hold torsemide Monday am and start gentle IVF 4-6 hours pre and post cath    Check am BMP     SUBJECTIVE:  Patient feeling well and denies any chest pain, shortness of breath, nausea, vomiting or diarrhea  She is a little anxious for the upcoming catheterization      OBJECTIVE:  Current Weight: Weight - Scale: 133 kg (292 lb 15 9 oz)  Vitals:    08/28/21 1458   BP: 155/72   Pulse: 74   Resp: 18   Temp: (!) 96 7 °F (35 9 °C)   SpO2: 97%       Intake/Output Summary (Last 24 hours) at 8/28/2021 1605  Last data filed at 8/28/2021 1242  Gross per 24 hour   Intake 600 ml   Output 3000 ml   Net -2400 ml       General:  No acute distress  Skin:  No rash  Eyes:  Sclerae anicteric  ENT:  Moist mucous membranes  Neck:  Trachea midline with no JVD  Chest:  Clear to auscultation bilaterally  CVS:  Regular rate and rhythm  Abdomen:  Soft, nontender, nondistended  Extremities:  No edema  Neuro:  Awake and alert  Psych:  Appropriate affect      Medications:  Scheduled Meds:  Current Facility-Administered Medications   Medication Dose Route Frequency Provider Last Rate    amLODIPine  5 mg Oral Daily Theodore Ly MD      aspirin  81 mg Oral Daily Catarino Preciado MD      atorvastatin  40 mg Oral After Carolina Phillip MD      carvedilol  25 mg Oral BID With Meals Payton Quiroz MD      cefTRIAXone  1,000 mg Intravenous Q24H Payton Quiroz MD      cholecalciferol  2,000 Units Oral Daily Edward Bass MD      clopidogrel  75 mg Oral Daily Catarino Preciado MD      docusate sodium  100 mg Oral BID Catarino Preciado MD      ferrous sulfate  325 mg Oral Every Other Day Payton Quiroz MD      heparin (porcine)  5,000 Units Subcutaneous Q8H [address] Catarino Preciado MD      HYDROcodone-acetaminophen  1 tablet Oral BID PRN Payton Quiroz MD      insulin glargine  50 Units Subcutaneous Q12H [address] [place] Duncan Callaway MD      insulin lispro  2-12 Units Subcutaneous TID AC Catarino Preciado MD      isosorbide mononitrate  30 mg Oral Daily Catarino Preciado MD      levothyroxine  50 mcg Oral Daily Catarino Preciado MD      nitroglycerin  0 4 mg Sublingual Q5 Min PRN Catarino Preciado MD      ondansetron  4 mg Oral Q6H PRN Payton Quiroz MD      promethazine  25 mg Intravenous Q12H PRN Martín Holloway MD      torsemide  20 mg Oral BID (diuretic) Nani Back DO         PRN Meds:  HYDROcodone-acetaminophen    nitroglycerin    ondansetron    promethazine      Laboratory Results:  Results from last 7 days   Lab Units 08/28/21  0447 08/27/21  0454 08/26/21  0444 08/25/21  0507 08/24/21  0430 08/23/21  2046   WBC Thousand/uL 9 60 8 00 8 30 8 20 9 60 11 00   HEMOGLOBIN g/dL 9 2* 8 7* 9  0* 8 7* 8 5* 8 6*   HEMATOCRIT % 28 3* 26 5* 26 7* 26 0* 25 1* 25 7*   PLATELETS Thousands/uL 219 210 220 213 220 230   SODIUM mmol/L 142 142 140 141 139 141   POTASSIUM mmol/L 4 0 4 2 4 5 4 8 4 5 4 9   CHLORIDE mmol/L 105 106 106 109* 109* 111*   CO2 mmol/L 28 28 24 26 22 22   BUN mg/dL 54* 54* 56* 57* 57* 56*   CREATININE mg/dL 2 22* 2 13* 2 31* 2 41* 2 35* 2 37*   CALCIUM mg/dL 8 9 9 0 8 9 8 9 8 3* 8 3*   MAGNESIUM mg/dL 1 7 1 7 1 9  --  2 2 2 0   PHOSPHORUS mg/dL 5 4* 5 5* 5 6*  --  5 1*  --

## 2021-08-28 NOTE — PLAN OF CARE
Problem: Potential for Falls  Goal: Patient will remain free of falls  Description: INTERVENTIONS:  - Educate patient/family on patient safety including physical limitations  - Instruct patient to call for assistance with activity   - Consult OT/PT to assist with strengthening/mobility   - Keep Call bell within reach  - Keep bed low and locked with side rails adjusted as appropriate  - Keep care items and personal belongings within reach  - Initiate and maintain comfort rounds  - Make Fall Risk Sign visible to staff  - Offer Toileting every  Hours, in advance of need  - Initiate/Maintain alarm  - Obtain necessary fall risk management equipment:   - Apply yellow socks and bracelet for high fall risk patients  - Consider moving patient to room near nurses station  Outcome: Progressing     Problem: MOBILITY - ADULT  Goal: Maintain or return to baseline ADL function  Description: INTERVENTIONS:  -  Assess patient's ability to carry out ADLs; assess patient's baseline for ADL function and identify physical deficits which impact ability to perform ADLs (bathing, care of mouth/teeth, toileting, grooming, dressing, etc )  - Assess/evaluate cause of self-care deficits   - Assess range of motion  - Assess patient's mobility; develop plan if impaired  - Assess patient's need for assistive devices and provide as appropriate  - Encourage maximum independence but intervene and supervise when necessary  - Involve family in performance of ADLs  - Assess for home care needs following discharge   - Consider OT consult to assist with ADL evaluation and planning for discharge  - Provide patient education as appropriate  Outcome: Progressing  Goal: Maintains/Returns to pre admission functional level  Description: INTERVENTIONS:  - Perform BMAT or MOVE assessment daily    - Set and communicate daily mobility goal to care team and patient/family/caregiver     - Collaborate with rehabilitation services on mobility goals if consulted  - Perform Range of Motion  times a day  - Reposition patient every  hours    - Dangle patient  times a day  - Stand patient  times a day  - Ambulate patient  times a day  - Out of bed to chair  times a day   - Out of bed for meals  times a day  - Out of bed for toileting  - Record patient progress and toleration of activity level   Outcome: Progressing     Problem: PAIN - ADULT  Goal: Verbalizes/displays adequate comfort level or baseline comfort level  Description: Interventions:  - Encourage patient to monitor pain and request assistance  - Assess pain using appropriate pain scale  - Administer analgesics based on type and severity of pain and evaluate response  - Implement non-pharmacological measures as appropriate and evaluate response  - Consider cultural and social influences on pain and pain management  - Notify physician/advanced practitioner if interventions unsuccessful or patient reports new pain  Outcome: Progressing     Problem: INFECTION - ADULT  Goal: Absence or prevention of progression during hospitalization  Description: INTERVENTIONS:  - Assess and monitor for signs and symptoms of infection  - Monitor lab/diagnostic results  - Monitor all insertion sites, i e  indwelling lines, tubes, and drains  - Monitor endotracheal if appropriate and nasal secretions for changes in amount and color  - [place] appropriate cooling/warming therapies per order  - Administer medications as ordered  - Instruct and encourage patient and family to use good hand hygiene technique  - Identify and instruct in appropriate isolation precautions for identified infection/condition  Outcome: Progressing  Goal: Absence of fever/infection during neutropenic period  Description: INTERVENTIONS:  - Monitor WBC    Outcome: Progressing     Problem: SAFETY ADULT  Goal: Patient will remain free of falls  Description: INTERVENTIONS:  - Educate patient/family on patient safety including physical limitations  - Instruct patient to call for assistance with activity   - Consult OT/PT to assist with strengthening/mobility   - Keep Call bell within reach  - Keep bed low and locked with side rails adjusted as appropriate  - Keep care items and personal belongings within reach  - Initiate and maintain comfort rounds  - Make Fall Risk Sign visible to staff  - Offer Toileting every  Hours, in advance of need  - Initiate/Maintain alarm  - Obtain necessary fall risk management equipment:   - Apply yellow socks and bracelet for high fall risk patients  - Consider moving patient to room near nurses station  Outcome: Progressing  Goal: Maintain or return to baseline ADL function  Description: INTERVENTIONS:  -  Assess patient's ability to carry out ADLs; assess patient's baseline for ADL function and identify physical deficits which impact ability to perform ADLs (bathing, care of mouth/teeth, toileting, grooming, dressing, etc )  - Assess/evaluate cause of self-care deficits   - Assess range of motion  - Assess patient's mobility; develop plan if impaired  - Assess patient's need for assistive devices and provide as appropriate  - Encourage maximum independence but intervene and supervise when necessary  - Involve family in performance of ADLs  - Assess for home care needs following discharge   - Consider OT consult to assist with ADL evaluation and planning for discharge  - Provide patient education as appropriate  Outcome: Progressing  Goal: Maintains/Returns to pre admission functional level  Description: INTERVENTIONS:  - Perform BMAT or MOVE assessment daily    - Set and communicate daily mobility goal to care team and patient/family/caregiver  - Collaborate with rehabilitation services on mobility goals if consulted  - Perform Range of Motion  times a day  - Reposition patient every  hours    - Dangle patient  times a day  - Stand patient  times a day  - Ambulate patient  times a day  - Out of bed to chair  times a day   - Out of bed for meals times a day  - Out of bed for toileting  - Record patient progress and toleration of activity level   Outcome: Progressing     Problem: DISCHARGE PLANNING  Goal: Discharge to home or other facility with appropriate resources  Description: INTERVENTIONS:  - Identify barriers to discharge w/patient and caregiver  - Arrange for needed discharge resources and transportation as appropriate  - Identify discharge learning needs (meds, wound care, etc )  - Arrange for interpretive services to assist at discharge as needed  - Refer to Case Management Department for coordinating discharge planning if the patient needs post-hospital services based on physician/advanced practitioner order or complex needs related to functional status, cognitive ability, or social support system  Outcome: Progressing     Problem: Knowledge Deficit  Goal: Patient/family/caregiver demonstrates understanding of disease process, treatment plan, medications, and discharge instructions  Description: Complete learning assessment and assess knowledge base    Interventions:  - Provide teaching at level of understanding  - Provide teaching via preferred learning methods  Outcome: Progressing     Problem: CARDIOVASCULAR - ADULT  Goal: Maintains optimal cardiac output and hemodynamic stability  Description: INTERVENTIONS:  - Monitor I/O, vital signs and rhythm  - Monitor for S/S and trends of decreased cardiac output  - Administer and titrate ordered vasoactive medications to optimize hemodynamic stability  - Assess quality of pulses, skin color and temperature  - Assess for signs of decreased coronary artery perfusion  - Instruct patient to report change in severity of symptoms  Outcome: Progressing  Goal: Absence of cardiac dysrhythmias or at baseline rhythm  Description: INTERVENTIONS:  - Continuous cardiac monitoring, vital signs, obtain 12 lead EKG if ordered  - Administer antiarrhythmic and heart rate control medications as ordered  - Monitor electrolytes and administer replacement therapy as ordered  Outcome: Progressing     Problem: GENITOURINARY - ADULT  Goal: Urinary catheter remains patent  Description: INTERVENTIONS:  - Assess patency of urinary catheter  - If patient has a chronic morales, consider changing catheter if non-functioning  - Follow guidelines for intermittent irrigation of non-functioning urinary catheter  Outcome: Progressing     Problem: Prexisting or High Potential for Compromised Skin Integrity  Goal: Skin integrity is maintained or improved  Description: INTERVENTIONS:  - Identify patients at risk for skin breakdown  - Assess and monitor skin integrity  - Assess and monitor nutrition and hydration status  - Monitor labs   - Assess for incontinence   - Turn and reposition patient  - Assist with mobility/ambulation  - Relieve pressure over bony prominences  - Avoid friction and shearing  - Provide appropriate hygiene as needed including keeping skin clean and dry  - Evaluate need for skin moisturizer/barrier cream  - Collaborate with interdisciplinary team   - Patient/family teaching  - Consider wound care consult   Outcome: Progressing     Problem: Nutrition/Hydration-ADULT  Goal: Nutrient/Hydration intake appropriate for improving, restoring or maintaining nutritional needs  Description: Monitor and assess patient's nutrition/hydration status for malnutrition  Collaborate with interdisciplinary team and initiate plan and interventions as ordered  Monitor patient's weight and dietary intake as ordered or per policy  Utilize nutrition screening tool and intervene as necessary  Determine patient's food preferences and provide high-protein, high-caloric foods as appropriate       INTERVENTIONS:  - Monitor oral intake, urinary output, labs, and treatment plans  - Assess nutrition and hydration status and recommend course of action  - Evaluate amount of meals eaten  - Assist patient with eating if necessary   - Allow adequate time for meals  - Recommend/ encourage appropriate diets, oral nutritional supplements, and vitamin/mineral supplements  - Order, calculate, and assess calorie counts as needed  - Recommend, monitor, and adjust tube feedings and TPN/PPN based on assessed needs  - Assess need for intravenous fluids  - Provide specific nutrition/hydration education as appropriate  - Include patient/family/caregiver in decisions related to nutrition  Outcome: Progressing

## 2021-08-28 NOTE — PROGRESS NOTES
Progress Note    Gloria Marquez 64 y o  female MRN: [id number]  Unit/Bed#: 7T [facility] 708-01 Encounter: [id number]  Admitting Physician: Cielo Nova MD  PCP: CHERELLE Jose  Date of Admission:  8/23/2021  8:23 PM    Assessment and Plan  * New onset of congestive heart failure ([address] )  Assessment & Plan  Wt Readings from Last 3 Encounters:   08/27/21 133 kg (292 lb 15 9 oz)   08/03/21 132 kg (290 lb)   08/03/21 132 kg (291 lb)       Presented with new onset of acute decompensated heart failure  Possibly secondary to UTI vs cardiac ischemia vs excessive fluid intake    Clinically improved, volume status closer to baseline  Diuresed approx  12 liters  Approaching her normative weight  Plan:   · Continue Telemetry  · Supplemental O2 to maintain saturation > 92%  · Continue torsemide 20mg BID switched from IV lasix last night per cardiology   · Fluid restriction @ 1200 cc/day and low salt diet  · Strict I&O and Daily Weights   · Continue other home medications  · Cath on Monday as per Cardiology   · will plan to transfer patient to [place] on Sunday   · hold diuretic in am of catherization (Monday morning)   · NPO Sunday night   · CBC, CMP, Mg, Phos in am      Type 2 diabetes mellitus with diabetic polyneuropathy, with long-term current use of insulin [facility])  Assessment & Plan  Lab Results   Component Value Date    HGBA1C 7 1 (H) 08/03/2021       Recent Labs     08/27/21  1118 08/27/21  1544 08/27/21 2001 08/28/21  0520   POCGLU 222* 208* 229* 124     Home regiment: Lantus 50 units b i d , Humalog 10 units t i d , Trulicity once weekly  Glycemic control has been improving from admission     Currently at goal as per ADA (-180)     Plan   · increased lantus to from 40 units to 50 units BID today  · Consider adding meal time lispro coverage as correctional insulin has been between 10-16 units for past few days   · Continue SSI   · Insulin Sliding Scale @ weight based Algorithm 3 with accuchecks ACHS  · Diabetic Diet with card restrcition level 3      CKD (chronic kidney disease) stage 4, GFR 15-29 ml/min [facility])  Assessment & Plan  Lab Results   Component Value Date    EGFR 24 (L) 08/28/2021    EGFR 25 (L) 08/27/2021    EGFR 23 (L) 08/26/2021    CREATININE 2 22 (H) 08/28/2021    CREATININE 2 13 (H) 08/27/2021    CREATININE 2 31 (H) 08/26/2021     Patient is back to baseline creatinine  Baseline creatinine 2 3-2 6 from May  Patient had detailed discussion with nephrology and cardiology regarding risk of ALFRED with contrast exposure and small risk of dialysis is she undergoes catheterization  and verbalized understanding  Plan   · Will avoid all nephrotoxic medications as possible  · Will continue torsemide 20 mg BID   · Hold on day of Cath   · closely monitor renal function while aggressively diuresing  · avoid ACE-inhibitor or ARB for time being   · Monitor urine output closely   · Mild increase in creatinine today, reach out to nephrology to consider adjustment of diuretics if creatinine continues to increase  CAD (coronary artery disease)  Assessment & Plan  Status post PCI x5 with stents placed in 2012 and 2017 [facility])  History of abnormal EKG reported by patient in the past  (no baseline EKG seen on file)  Troponin negative x 3  EKG:  NSR, LBBB    Plan  · GTN 0 4 mg sublingual p r n ilure  · continue amlodipine 5 mg daily, isosorbide mononitrate 30 mg daily, clopidogrel 75 mg daily, aspirin 81 mg daily, Metoprolol 25 mg daily   · cardiology recommend heart cath Monday (8/30/2021)    Prolonged QT interval  Assessment & Plan  Mild at 486  · Will continue to monitor and replete electrolytes as necessary  · Will continue to monitor on telemetry  Hypocalcemia  Assessment & Plan  Lab Results   Component Value Date    CALCIUM 8 9 08/28/2021    PHOS 5 5 (H) 08/27/2021     Hypocalcemia has resolved  Corrected calcium is 9 2  PTH levels elevated 107 and Vit D at 23 6   This indicates hypocalcemia likely In the setting of secondary hypoparathyroidism as a result of chronic kidney disease    Plan  · Will continue Vitamin D3 2000 units daily   · will continue to monitor creatinine  · will continue to follow on morning CMP     Neurogenic bladder  Assessment & Plan  Suspected secondary to diabetes  Has suprapubic catheter placed in June 2021 changed Qmonthly  Last changed 08/03/2021 by [facility]  Catheter site clean with no dressing  Urinary tract infection associated with cystostomy catheter [facility])  Assessment & Plan  Urinalysis positive for blood, nitrates, white blood cells and bacteria  Cultures were positive for >100,000 cfu/ml Klebsiella susceptible to Ceftriaxone     Plan   · Continue ceftriaxone 1 g IV daily  · Urology recommends maintain ABX until morales can be replaced which will occur at the [facility] touch base with them as patient is likely to remain here for the weekend and obtain recs for abx and catheter management  HTN (hypertension)  Assessment & Plan  Blood Pressure: 161/87 Has not received morning medications  Plan   · Will continue amlodipine 5 mg daily and COREG 25 mg BID   · Low-salt diet  · Monitor blood pressure t i d     Normochromic normocytic anemia  Assessment & Plan  Normocytic normochromic  Likely secondary to CKD  However no prior colonoscopy done  Labs were significant for low iron levels at 26 and TIBC at 234  Plan   · Will continue to follow CBC   · Ferrous sulfate 325mg every other day   · Follow up on FOBT   · Low threshold for blood transfusion with hemoglobin less than 8    · Will recommend colonoscopy to be done outpatient       VTE Pharmacologic Prophylaxis:   Pharmacologic: Heparin  Mechanical VTE Prophylaxis in Place: Yes    Patient Centered Rounds: I have performed bedside rounds with nursing staff today  Discussions with Specialists or Other Care Team Provider: none today       Education and Discussions with Family / Patient: discussed plan with patient and answered all questions and concerns    Time Spent for Care: 30 minutes  More than 50% of total time spent on counseling and coordination of care as described above  Current Length of Stay: 5 day(s)    Current Patient Status: Inpatient   Certification Statement: The patient will continue to require additional inpatient hospital stay due to continual diuresis and as per cardiology cath procedure on 21  Discharge Plan: Will be transferred to [facility] on  evening for cardiac cath procedure on Monday  Code Status: Level 1 - Full Code    Subjective:   Patient reports that she is feeling much better  Has not had any difficulty breathing, eating and sleeping well  Spoke to pt today about setting the gooal today to get out of bed and into chair to help with mobilization and avoid deconditioning  Objective:     Vitals:   Temp (24hrs), Av 3 °F (36 3 °C), Min:96 9 °F (36 1 °C), Max:97 6 °F (36 4 °C)    Temp:  [96 9 °F (36 1 °C)-97 6 °F (36 4 °C)] 97 5 °F (36 4 °C)  HR:  [69-76] 74  Resp:  [18] 18  BP: (138-161)/(72-87) 161/87  SpO2:  [95 %-98 %] 98 %  Body mass index is 44 55 kg/m²  Input and Output Summary (last 24 hours): Intake/Output Summary (Last 24 hours) at 2021 1235  Last data filed at 2021 0900  Gross per 24 hour   Intake 360 ml   Output 2300 ml   Net -1940 ml       Physical Exam:     Physical Exam  Vitals reviewed  Constitutional:       General: She is not in acute distress  Appearance: She is obese  HENT:      Head: Normocephalic and atraumatic  Right Ear: Tympanic membrane, ear canal and external ear normal       Left Ear: Tympanic membrane, ear canal and external ear normal    Eyes:      Conjunctiva/sclera: Conjunctivae normal       Pupils: Pupils are equal, round, and reactive to light  Cardiovascular:      Rate and Rhythm: Normal rate and regular rhythm  Pulses: Normal pulses        Heart sounds: Normal heart sounds  No murmur heard  No friction rub  No gallop  Pulmonary:      Effort: Pulmonary effort is normal  No respiratory distress  Breath sounds: Normal breath sounds  No stridor  No wheezing, rhonchi or rales  Musculoskeletal:         General: Normal range of motion  Right lower le+ Pitting Edema present  Left lower le+ Pitting Edema present  Comments: Right lower leg - erythematic patch with skin breakdown  Skin:     General: Skin is warm  Neurological:      General: No focal deficit present  Mental Status: She is alert and oriented to person, place, and time  Psychiatric:         Mood and Affect: Mood normal          Behavior: Behavior normal        Additional Data:     Labs:    Results from last 7 days   Lab Units 21  0447   WBC Thousand/uL 9 60   HEMOGLOBIN g/dL 9 2*   HEMATOCRIT % 28 3*   PLATELETS Thousands/uL 219   NEUTROS PCT % 64   LYMPHS PCT % 22*   MONOS PCT % 11*   EOS PCT % 3     Results from last 7 days   Lab Units 21  0447   POTASSIUM mmol/L 4 0   CHLORIDE mmol/L 105   CO2 mmol/L 28   BUN mg/dL 54*   CREATININE mg/dL 2 22*   CALCIUM mg/dL 8 9   ALK PHOS U/L 101   ALT U/L 15   AST U/L 28     Results from last 7 days   Lab Units 21  2046   INR  1 03     Results from last 7 days   Lab Units 21  1109 21  0520 21  1544 21  1118 21  0537 21  1545 21  1101 21  0555 21  2022 21  1603   POC GLUCOSE mg/dl 138 124 229* 208* 222* 190* 224* 185* 253* 252* 260* 240*         * I Have Reviewed All Lab Data Listed Above  * Additional Pertinent Lab Tests Reviewed:  All Labs Within Last 24 Hours Reviewed    Imaging:    Imaging Reports Reviewed Today Include: None   Imaging Personally Reviewed by Myself Includes: None    Recent Cultures (last 7 days):     Results from last 7 days   Lab Units 21  2051   URINE CULTURE  >100,000 cfu/ml Klebsiella oxytoca*  10,000-19,000 cfu/ml Corynebacterium striatum group*  10,000-19,000 cfu/ml Corynebacterium amycolatum*       Last 24 Hours Medication List:   Current Facility-Administered Medications   Medication Dose Route Frequency Provider Last Rate    amLODIPine  5 mg Oral Daily Bridger Arzola MD      aspirin  81 mg Oral Daily Catarino Duval MD      atorvastatin  40 mg Oral After Carlos Manuelon Hugo Phillip MD      carvedilol  25 mg Oral BID With Meals Payton Paez MD      cefTRIAXone  1,000 mg Intravenous Q24H Payton Paez MD      cholecalciferol  2,000 Units Oral Daily Jennifer Szymanski MD      clopidogrel  75 mg Oral Daily Catarino Duval MD      docusate sodium  100 mg Oral BID Catarino Duval MD      ferrous sulfate  325 mg Oral Every Other Day Payton Paez MD      heparin (porcine)  5,000 Units Subcutaneous Q8H [address] Catarino Duval MD      HYDROcodone-acetaminophen  1 tablet Oral BID PRN Payton Paez MD      insulin glargine  50 Units Subcutaneous Q12H [address] Omkar Elder MD      insulin lispro  2-12 Units Subcutaneous TID AC Catarino Duval MD      isosorbide mononitrate  30 mg Oral Daily Catarino Duval MD      levothyroxine  50 mcg Oral Daily Catarino Duval MD      nitroglycerin  0 4 mg Sublingual Q5 Min PRN Catarino Duval MD      ondansetron  4 mg Oral Q6H PRN Payton Paez MD      promethazine  25 mg Intravenous Q12H PRN Christopher Melara MD      torsemide  20 mg Oral BID (diuretic) Milagros Singleton DO          ** Please Note: Dictation voice to text software may have been used in the creation of this document   Lakshmi Hagen MD  08/28/21  12:35 PM

## 2021-08-29 ENCOUNTER — HOSPITAL ENCOUNTER (INPATIENT)
Facility: HOSPITAL | Age: 61
LOS: 3 days | Discharge: HOME HEALTH | DRG: 286 | End: 2021-09-01
Attending: STUDENT IN AN ORGANIZED HEALTH CARE EDUCATION/TRAINING PROGRAM | Admitting: STUDENT IN AN ORGANIZED HEALTH CARE EDUCATION/TRAINING PROGRAM
Payer: COMMERCIAL

## 2021-08-29 VITALS
SYSTOLIC BLOOD PRESSURE: 139 MMHG | HEART RATE: 62 BPM | DIASTOLIC BLOOD PRESSURE: 65 MMHG | RESPIRATION RATE: 17 BRPM | TEMPERATURE: 96.1 F | WEIGHT: 292.99 LBS | HEIGHT: 68 IN | OXYGEN SATURATION: 93 % | BODY MASS INDEX: 44.41 KG/M2

## 2021-08-29 DIAGNOSIS — R41.3 MEMORY IMPAIRMENT: ICD-10-CM

## 2021-08-29 DIAGNOSIS — I50.9 NEW ONSET OF CONGESTIVE HEART FAILURE (HCC): Primary | ICD-10-CM

## 2021-08-29 DIAGNOSIS — N39.0 UTI (URINARY TRACT INFECTION): ICD-10-CM

## 2021-08-29 DIAGNOSIS — D64.9 NORMOCHROMIC NORMOCYTIC ANEMIA: ICD-10-CM

## 2021-08-29 DIAGNOSIS — N18.4 CKD (CHRONIC KIDNEY DISEASE) STAGE 4, GFR 15-29 ML/MIN (HCC): ICD-10-CM

## 2021-08-29 DIAGNOSIS — Z79.4 TYPE 2 DIABETES MELLITUS WITH DIABETIC POLYNEUROPATHY, WITH LONG-TERM CURRENT USE OF INSULIN (HCC): ICD-10-CM

## 2021-08-29 DIAGNOSIS — E11.42 TYPE 2 DIABETES MELLITUS WITH DIABETIC POLYNEUROPATHY, WITH LONG-TERM CURRENT USE OF INSULIN (HCC): ICD-10-CM

## 2021-08-29 DIAGNOSIS — Z98.1 S/P CERVICAL SPINAL FUSION: ICD-10-CM

## 2021-08-29 DIAGNOSIS — N31.9 NEUROGENIC BLADDER: ICD-10-CM

## 2021-08-29 LAB
ANION GAP SERPL CALCULATED.3IONS-SCNC: 8 MMOL/L (ref 5–14)
BASOPHILS # BLD AUTO: 0.1 THOUSANDS/ΜL (ref 0–0.1)
BASOPHILS NFR BLD AUTO: 1 % (ref 0–1)
BUN SERPL-MCNC: 57 MG/DL (ref 5–25)
CALCIUM SERPL-MCNC: 8.6 MG/DL (ref 8.4–10.2)
CHLORIDE SERPL-SCNC: 104 MMOL/L (ref 97–108)
CO2 SERPL-SCNC: 29 MMOL/L (ref 22–30)
CREAT SERPL-MCNC: 2.44 MG/DL (ref 0.6–1.2)
EOSINOPHIL # BLD AUTO: 0.3 THOUSAND/ΜL (ref 0–0.4)
EOSINOPHIL NFR BLD AUTO: 3 % (ref 0–6)
ERYTHROCYTE [DISTWIDTH] IN BLOOD BY AUTOMATED COUNT: 16.3 %
GFR SERPL CREATININE-BSD FRML MDRD: 21 ML/MIN/1.73SQ M
GLUCOSE SERPL-MCNC: 128 MG/DL (ref 65–140)
GLUCOSE SERPL-MCNC: 130 MG/DL (ref 70–99)
GLUCOSE SERPL-MCNC: 132 MG/DL (ref 65–140)
GLUCOSE SERPL-MCNC: 134 MG/DL (ref 65–140)
GLUCOSE SERPL-MCNC: 184 MG/DL (ref 65–140)
HCT VFR BLD AUTO: 26.1 % (ref 36–46)
HEMOCCULT STL QL: NEGATIVE
HGB BLD-MCNC: 8.8 G/DL (ref 12–16)
LYMPHOCYTES # BLD AUTO: 1.9 THOUSANDS/ΜL (ref 0.5–4)
LYMPHOCYTES NFR BLD AUTO: 19 % (ref 25–45)
MAGNESIUM SERPL-MCNC: 1.7 MG/DL (ref 1.6–2.3)
MCH RBC QN AUTO: 32 PG (ref 26–34)
MCHC RBC AUTO-ENTMCNC: 33.7 G/DL (ref 31–36)
MCV RBC AUTO: 95 FL (ref 80–100)
MONOCYTES # BLD AUTO: 0.8 THOUSAND/ΜL (ref 0.2–0.9)
MONOCYTES NFR BLD AUTO: 8 % (ref 1–10)
NEUTROPHILS # BLD AUTO: 6.9 THOUSANDS/ΜL (ref 1.8–7.8)
NEUTS SEG NFR BLD AUTO: 69 % (ref 45–65)
PHOSPHATE SERPL-MCNC: 5.8 MG/DL (ref 2.5–4.8)
PLATELET # BLD AUTO: 224 THOUSANDS/UL (ref 150–450)
PMV BLD AUTO: 7.3 FL (ref 8.9–12.7)
POTASSIUM SERPL-SCNC: 3.8 MMOL/L (ref 3.6–5)
RBC # BLD AUTO: 2.75 MILLION/UL (ref 4–5.2)
SODIUM SERPL-SCNC: 141 MMOL/L (ref 137–147)
WBC # BLD AUTO: 10 THOUSAND/UL (ref 4.5–11)

## 2021-08-29 PROCEDURE — 82272 OCCULT BLD FECES 1-3 TESTS: CPT | Performed by: PHYSICIAN ASSISTANT

## 2021-08-29 PROCEDURE — 99239 HOSP IP/OBS DSCHRG MGMT >30: CPT | Performed by: FAMILY MEDICINE

## 2021-08-29 PROCEDURE — 80048 BASIC METABOLIC PNL TOTAL CA: CPT | Performed by: PHYSICIAN ASSISTANT

## 2021-08-29 PROCEDURE — NC001 PR NO CHARGE: Performed by: INTERNAL MEDICINE

## 2021-08-29 PROCEDURE — 82948 REAGENT STRIP/BLOOD GLUCOSE: CPT

## 2021-08-29 PROCEDURE — 85025 COMPLETE CBC W/AUTO DIFF WBC: CPT

## 2021-08-29 PROCEDURE — 83735 ASSAY OF MAGNESIUM: CPT

## 2021-08-29 PROCEDURE — 99232 SBSQ HOSP IP/OBS MODERATE 35: CPT | Performed by: PHYSICIAN ASSISTANT

## 2021-08-29 PROCEDURE — 99223 1ST HOSP IP/OBS HIGH 75: CPT | Performed by: PHYSICIAN ASSISTANT

## 2021-08-29 PROCEDURE — 84100 ASSAY OF PHOSPHORUS: CPT

## 2021-08-29 RX ORDER — INSULIN GLARGINE 100 [IU]/ML
50 INJECTION, SOLUTION SUBCUTANEOUS EVERY 12 HOURS SCHEDULED
Status: CANCELLED | OUTPATIENT
Start: 2021-08-29

## 2021-08-29 RX ORDER — AMLODIPINE BESYLATE 5 MG/1
5 TABLET ORAL DAILY
Status: CANCELLED | OUTPATIENT
Start: 2021-08-30

## 2021-08-29 RX ORDER — ASPIRIN 81 MG/1
81 TABLET ORAL DAILY
Status: DISCONTINUED | OUTPATIENT
Start: 2021-08-30 | End: 2021-09-01 | Stop reason: HOSPADM

## 2021-08-29 RX ORDER — HEPARIN SODIUM 5000 [USP'U]/ML
5000 INJECTION, SOLUTION INTRAVENOUS; SUBCUTANEOUS EVERY 8 HOURS SCHEDULED
Status: DISCONTINUED | OUTPATIENT
Start: 2021-08-29 | End: 2021-09-01 | Stop reason: HOSPADM

## 2021-08-29 RX ORDER — ISOSORBIDE MONONITRATE 30 MG/1
30 TABLET, EXTENDED RELEASE ORAL DAILY
Status: CANCELLED | OUTPATIENT
Start: 2021-08-30

## 2021-08-29 RX ORDER — SACCHAROMYCES BOULARDII 250 MG
250 CAPSULE ORAL 2 TIMES DAILY
Status: DISCONTINUED | OUTPATIENT
Start: 2021-08-29 | End: 2021-09-01 | Stop reason: HOSPADM

## 2021-08-29 RX ORDER — LEVOTHYROXINE SODIUM 0.05 MG/1
50 TABLET ORAL
Status: DISCONTINUED | OUTPATIENT
Start: 2021-08-30 | End: 2021-09-01 | Stop reason: HOSPADM

## 2021-08-29 RX ORDER — NYSTATIN 100000 [USP'U]/G
POWDER TOPICAL 2 TIMES DAILY
Status: DISCONTINUED | OUTPATIENT
Start: 2021-08-29 | End: 2021-09-01 | Stop reason: HOSPADM

## 2021-08-29 RX ORDER — ONDANSETRON 4 MG/1
4 TABLET, ORALLY DISINTEGRATING ORAL EVERY 6 HOURS PRN
Status: CANCELLED | OUTPATIENT
Start: 2021-08-29

## 2021-08-29 RX ORDER — INSULIN GLARGINE 100 [IU]/ML
50 INJECTION, SOLUTION SUBCUTANEOUS EVERY 12 HOURS SCHEDULED
Status: DISCONTINUED | OUTPATIENT
Start: 2021-08-29 | End: 2021-09-01 | Stop reason: HOSPADM

## 2021-08-29 RX ORDER — HYDROCODONE BITARTRATE AND ACETAMINOPHEN 5; 325 MG/1; MG/1
1 TABLET ORAL 2 TIMES DAILY PRN
Status: CANCELLED | OUTPATIENT
Start: 2021-08-29

## 2021-08-29 RX ORDER — ISOSORBIDE MONONITRATE 30 MG/1
30 TABLET, EXTENDED RELEASE ORAL DAILY
Status: DISCONTINUED | OUTPATIENT
Start: 2021-08-30 | End: 2021-09-01 | Stop reason: HOSPADM

## 2021-08-29 RX ORDER — NYSTATIN 100000 [USP'U]/G
POWDER TOPICAL 2 TIMES DAILY
Status: DISCONTINUED | OUTPATIENT
Start: 2021-08-29 | End: 2021-08-29 | Stop reason: HOSPADM

## 2021-08-29 RX ORDER — CARVEDILOL 12.5 MG/1
25 TABLET ORAL 2 TIMES DAILY WITH MEALS
Status: CANCELLED | OUTPATIENT
Start: 2021-08-29

## 2021-08-29 RX ORDER — DOCUSATE SODIUM 100 MG/1
100 CAPSULE, LIQUID FILLED ORAL 2 TIMES DAILY
Status: DISCONTINUED | OUTPATIENT
Start: 2021-08-29 | End: 2021-09-01 | Stop reason: HOSPADM

## 2021-08-29 RX ORDER — CLOPIDOGREL BISULFATE 75 MG/1
75 TABLET ORAL DAILY
Status: DISCONTINUED | OUTPATIENT
Start: 2021-08-30 | End: 2021-09-01 | Stop reason: HOSPADM

## 2021-08-29 RX ORDER — DICYCLOMINE HYDROCHLORIDE 10 MG/1
10 CAPSULE ORAL
Status: DISCONTINUED | OUTPATIENT
Start: 2021-08-29 | End: 2021-09-01 | Stop reason: HOSPADM

## 2021-08-29 RX ORDER — SACCHAROMYCES BOULARDII 250 MG
250 CAPSULE ORAL 2 TIMES DAILY
Status: CANCELLED | OUTPATIENT
Start: 2021-08-29

## 2021-08-29 RX ORDER — PROMETHAZINE HYDROCHLORIDE 25 MG/ML
25 INJECTION, SOLUTION INTRAMUSCULAR; INTRAVENOUS EVERY 12 HOURS PRN
Status: DISCONTINUED | OUTPATIENT
Start: 2021-08-29 | End: 2021-09-01 | Stop reason: HOSPADM

## 2021-08-29 RX ORDER — TORSEMIDE 20 MG/1
20 TABLET ORAL
Status: DISCONTINUED | OUTPATIENT
Start: 2021-08-29 | End: 2021-08-29

## 2021-08-29 RX ORDER — SODIUM CHLORIDE 9 MG/ML
50 INJECTION, SOLUTION INTRAVENOUS CONTINUOUS
Status: DISCONTINUED | OUTPATIENT
Start: 2021-08-30 | End: 2021-08-30

## 2021-08-29 RX ORDER — TORSEMIDE 20 MG/1
20 TABLET ORAL
Status: CANCELLED | OUTPATIENT
Start: 2021-08-29

## 2021-08-29 RX ORDER — AMLODIPINE BESYLATE 5 MG/1
5 TABLET ORAL DAILY
Status: DISCONTINUED | OUTPATIENT
Start: 2021-08-30 | End: 2021-09-01 | Stop reason: HOSPADM

## 2021-08-29 RX ORDER — SODIUM CHLORIDE 9 MG/ML
50 INJECTION, SOLUTION INTRAVENOUS CONTINUOUS
Status: DISCONTINUED | OUTPATIENT
Start: 2021-08-30 | End: 2021-08-29

## 2021-08-29 RX ORDER — DICYCLOMINE HYDROCHLORIDE 10 MG/1
10 CAPSULE ORAL
Status: CANCELLED | OUTPATIENT
Start: 2021-08-29

## 2021-08-29 RX ORDER — MELATONIN
2000 DAILY
Status: CANCELLED | OUTPATIENT
Start: 2021-08-30

## 2021-08-29 RX ORDER — FERROUS SULFATE 325(65) MG
325 TABLET ORAL EVERY OTHER DAY
Status: DISCONTINUED | OUTPATIENT
Start: 2021-08-30 | End: 2021-09-01 | Stop reason: HOSPADM

## 2021-08-29 RX ORDER — HEPARIN SODIUM 5000 [USP'U]/ML
5000 INJECTION, SOLUTION INTRAVENOUS; SUBCUTANEOUS EVERY 8 HOURS SCHEDULED
Status: CANCELLED | OUTPATIENT
Start: 2021-08-29

## 2021-08-29 RX ORDER — NITROGLYCERIN 0.4 MG/1
0.4 TABLET SUBLINGUAL
Status: DISCONTINUED | OUTPATIENT
Start: 2021-08-29 | End: 2021-09-01 | Stop reason: HOSPADM

## 2021-08-29 RX ORDER — CEFTRIAXONE 1 G/50ML
1000 INJECTION, SOLUTION INTRAVENOUS EVERY 24 HOURS
Status: CANCELLED | OUTPATIENT
Start: 2021-08-29

## 2021-08-29 RX ORDER — CLOPIDOGREL BISULFATE 75 MG/1
75 TABLET ORAL DAILY
Status: CANCELLED | OUTPATIENT
Start: 2021-08-30

## 2021-08-29 RX ORDER — MELATONIN
2000 DAILY
Status: DISCONTINUED | OUTPATIENT
Start: 2021-08-30 | End: 2021-09-01 | Stop reason: HOSPADM

## 2021-08-29 RX ORDER — NYSTATIN 100000 [USP'U]/G
POWDER TOPICAL 2 TIMES DAILY
Status: CANCELLED | OUTPATIENT
Start: 2021-08-29

## 2021-08-29 RX ORDER — ACETAMINOPHEN 325 MG/1
650 TABLET ORAL EVERY 6 HOURS PRN
Status: DISCONTINUED | OUTPATIENT
Start: 2021-08-29 | End: 2021-09-01 | Stop reason: HOSPADM

## 2021-08-29 RX ORDER — DOCUSATE SODIUM 100 MG/1
100 CAPSULE, LIQUID FILLED ORAL 2 TIMES DAILY
Status: CANCELLED | OUTPATIENT
Start: 2021-08-29

## 2021-08-29 RX ORDER — PROMETHAZINE HYDROCHLORIDE 25 MG/ML
25 INJECTION, SOLUTION INTRAMUSCULAR; INTRAVENOUS EVERY 12 HOURS PRN
Status: CANCELLED | OUTPATIENT
Start: 2021-08-29

## 2021-08-29 RX ORDER — NITROGLYCERIN 0.4 MG/1
0.4 TABLET SUBLINGUAL
Status: CANCELLED | OUTPATIENT
Start: 2021-08-29

## 2021-08-29 RX ORDER — ATORVASTATIN CALCIUM 40 MG/1
40 TABLET, FILM COATED ORAL
Status: DISCONTINUED | OUTPATIENT
Start: 2021-08-29 | End: 2021-09-01 | Stop reason: HOSPADM

## 2021-08-29 RX ORDER — CARVEDILOL 25 MG/1
25 TABLET ORAL 2 TIMES DAILY WITH MEALS
Status: DISCONTINUED | OUTPATIENT
Start: 2021-08-29 | End: 2021-09-01 | Stop reason: HOSPADM

## 2021-08-29 RX ORDER — ONDANSETRON 4 MG/1
4 TABLET, ORALLY DISINTEGRATING ORAL EVERY 6 HOURS PRN
Status: DISCONTINUED | OUTPATIENT
Start: 2021-08-29 | End: 2021-09-01 | Stop reason: HOSPADM

## 2021-08-29 RX ORDER — LEVOTHYROXINE SODIUM 0.05 MG/1
50 TABLET ORAL DAILY
Status: CANCELLED | OUTPATIENT
Start: 2021-08-30

## 2021-08-29 RX ORDER — FERROUS SULFATE 325(65) MG
325 TABLET ORAL EVERY OTHER DAY
Status: CANCELLED | OUTPATIENT
Start: 2021-08-30

## 2021-08-29 RX ORDER — HYDROCODONE BITARTRATE AND ACETAMINOPHEN 5; 325 MG/1; MG/1
1 TABLET ORAL 2 TIMES DAILY PRN
Status: DISCONTINUED | OUTPATIENT
Start: 2021-08-29 | End: 2021-09-01 | Stop reason: HOSPADM

## 2021-08-29 RX ORDER — ATORVASTATIN CALCIUM 40 MG/1
40 TABLET, FILM COATED ORAL
Status: CANCELLED | OUTPATIENT
Start: 2021-08-29

## 2021-08-29 RX ORDER — ASPIRIN 81 MG/1
81 TABLET ORAL DAILY
Status: CANCELLED | OUTPATIENT
Start: 2021-08-30

## 2021-08-29 RX ADMIN — CARVEDILOL 25 MG: 25 TABLET, FILM COATED ORAL at 17:03

## 2021-08-29 RX ADMIN — LEVOTHYROXINE SODIUM 50 MCG: 50 TABLET ORAL at 06:04

## 2021-08-29 RX ADMIN — DICYCLOMINE HYDROCHLORIDE 10 MG: 10 CAPSULE ORAL at 17:03

## 2021-08-29 RX ADMIN — ASPIRIN 81 MG: 81 TABLET, COATED ORAL at 08:25

## 2021-08-29 RX ADMIN — DOCUSATE SODIUM 100 MG: 100 CAPSULE, LIQUID FILLED ORAL at 08:25

## 2021-08-29 RX ADMIN — Medication 250 MG: at 17:03

## 2021-08-29 RX ADMIN — HEPARIN SODIUM 5000 UNITS: 5000 INJECTION INTRAVENOUS; SUBCUTANEOUS at 13:24

## 2021-08-29 RX ADMIN — INSULIN GLARGINE 50 UNITS: 100 INJECTION, SOLUTION SUBCUTANEOUS at 21:13

## 2021-08-29 RX ADMIN — CEFTRIAXONE 1000 MG: 1 INJECTION, POWDER, FOR SOLUTION INTRAMUSCULAR; INTRAVENOUS at 22:28

## 2021-08-29 RX ADMIN — DICYCLOMINE HYDROCHLORIDE 10 MG: 10 CAPSULE ORAL at 21:14

## 2021-08-29 RX ADMIN — DICYCLOMINE HYDROCHLORIDE 10 MG: 10 CAPSULE ORAL at 11:39

## 2021-08-29 RX ADMIN — ACETAMINOPHEN 650 MG: 325 TABLET, FILM COATED ORAL at 21:13

## 2021-08-29 RX ADMIN — ISOSORBIDE MONONITRATE 30 MG: 30 TABLET, EXTENDED RELEASE ORAL at 08:25

## 2021-08-29 RX ADMIN — HYDROCODONE BITARTRATE AND ACETAMINOPHEN 1 TABLET: 5; 325 TABLET ORAL at 00:51

## 2021-08-29 RX ADMIN — NYSTATIN: 100000 POWDER TOPICAL at 11:39

## 2021-08-29 RX ADMIN — INSULIN GLARGINE 50 UNITS: 100 INJECTION, SOLUTION SUBCUTANEOUS at 08:24

## 2021-08-29 RX ADMIN — DICYCLOMINE HYDROCHLORIDE 10 MG: 10 CAPSULE ORAL at 06:04

## 2021-08-29 RX ADMIN — HEPARIN SODIUM 5000 UNITS: 5000 INJECTION INTRAVENOUS; SUBCUTANEOUS at 21:13

## 2021-08-29 RX ADMIN — ATORVASTATIN CALCIUM 40 MG: 40 TABLET, FILM COATED ORAL at 17:03

## 2021-08-29 RX ADMIN — DOCUSATE SODIUM 100 MG: 100 CAPSULE, LIQUID FILLED ORAL at 17:03

## 2021-08-29 RX ADMIN — HEPARIN SODIUM 5000 UNITS: 5000 INJECTION INTRAVENOUS; SUBCUTANEOUS at 06:04

## 2021-08-29 RX ADMIN — CLOPIDOGREL BISULFATE 75 MG: 75 TABLET ORAL at 08:24

## 2021-08-29 RX ADMIN — AMLODIPINE BESYLATE 5 MG: 5 TABLET ORAL at 08:24

## 2021-08-29 RX ADMIN — NYSTATIN: 100000 POWDER TOPICAL at 17:03

## 2021-08-29 RX ADMIN — Medication 2000 UNITS: at 08:25

## 2021-08-29 RX ADMIN — Medication 250 MG: at 08:24

## 2021-08-29 RX ADMIN — TORSEMIDE 20 MG: 20 TABLET ORAL at 07:44

## 2021-08-29 RX ADMIN — CARVEDILOL 25 MG: 12.5 TABLET, FILM COATED ORAL at 07:44

## 2021-08-29 NOTE — ASSESSMENT & PLAN NOTE
Wt Readings from Last 3 Encounters:   08/27/21 133 kg (292 lb 15 9 oz)   08/03/21 132 kg (290 lb)   08/03/21 132 kg (291 lb)     · Patient presented to the ED with shortness of breath and weight gain   · Initially required IV Lasix, switch to p o  torsemide 20 mg b i d   · Continue cardiac diet with fluid and salt restriction  · Plan for cardiac catheterization tomorrow  · NPO after midnight  · Hold diuretic tomorrow morning  · Gentle IV fluids pre and post catheterization  · Echocardiogram showing EF of 45%, mild left ventricular hypertrophy, grade 2 diastolic dysfunction

## 2021-08-29 NOTE — ASSESSMENT & PLAN NOTE
Lab Results   Component Value Date    HGBA1C 7 1 (H) 08/03/2021       Recent Labs     08/28/21  1608 08/28/21  2056 08/29/21  0606 08/29/21  1056   POCGLU 161* 208* 134 128       Blood Sugar Average: Last 72 hrs:  ·  home regimen Lantus 50 units b i d , Humalog 10 units t i d  with meals and Trulicity once weekly   · Continue Lantus 50 units b i d  while inpatient  · Sliding-scale insulin coverage with Accu-Cheks  · Diabetic diet

## 2021-08-29 NOTE — PLAN OF CARE
Problem: Potential for Falls  Goal: Patient will remain free of falls  Description: INTERVENTIONS:  - Educate patient/family on patient safety including physical limitations  - Instruct patient to call for assistance with activity   - Consult OT/PT to assist with strengthening/mobility   - Keep Call bell within reach  - Keep bed low and locked with side rails adjusted as appropriate  - Keep care items and personal belongings within reach  - Initiate and maintain comfort rounds  - Make Fall Risk Sign visible to staff  - Offer Toileting every  Hours, in advance of need  - Initiate/Maintain alarm  - Obtain necessary fall risk management equipment:   - Apply yellow socks and bracelet for high fall risk patients  - Consider moving patient to room near nurses station  Outcome: Progressing     Problem: CARDIOVASCULAR - ADULT  Goal: Maintains optimal cardiac output and hemodynamic stability  Description: INTERVENTIONS:  - Monitor I/O, vital signs and rhythm  - Monitor for S/S and trends of decreased cardiac output  - Administer and titrate ordered vasoactive medications to optimize hemodynamic stability  - Assess quality of pulses, skin color and temperature  - Assess for signs of decreased coronary artery perfusion  - Instruct patient to report change in severity of symptoms  Outcome: Progressing  Goal: Absence of cardiac dysrhythmias or at baseline rhythm  Description: INTERVENTIONS:  - Continuous cardiac monitoring, vital signs, obtain 12 lead EKG if ordered  - Administer antiarrhythmic and heart rate control medications as ordered  - Monitor electrolytes and administer replacement therapy as ordered  Outcome: Progressing     Problem: RESPIRATORY - ADULT  Goal: Achieves optimal ventilation and oxygenation  Description: INTERVENTIONS:  - Assess for changes in respiratory status  - Assess for changes in mentation and behavior  - Position to facilitate oxygenation and minimize respiratory effort  - Oxygen administered by appropriate delivery if ordered  - Initiate smoking cessation education as indicated  - Encourage broncho-pulmonary hygiene including cough, deep breathe, Incentive Spirometry  - Assess the need for suctioning and aspirate as needed  - Assess and instruct to report SOB or any respiratory difficulty  - Respiratory Therapy support as indicated  Outcome: Progressing     Problem: GASTROINTESTINAL - ADULT  Goal: Minimal or absence of nausea and/or vomiting  Description: INTERVENTIONS:  - Administer IV fluids if ordered to ensure adequate hydration  - Maintain NPO status until nausea and vomiting are resolved  - Nasogastric tube if ordered  - Administer ordered antiemetic medications as needed  - Provide nonpharmacologic comfort measures as appropriate  - Advance diet as tolerated, if ordered  - Consider nutrition services referral to assist patient with adequate nutrition and appropriate food choices  Outcome: Progressing  Goal: Maintains or returns to baseline bowel function  Description: INTERVENTIONS:  - Assess bowel function  - Encourage oral fluids to ensure adequate hydration  - Administer IV fluids if ordered to ensure adequate hydration  - Administer ordered medications as needed  - Encourage mobilization and activity  - Consider nutritional services referral to assist patient with adequate nutrition and appropriate food choices  Outcome: Progressing  Goal: Maintains adequate nutritional intake  Description: INTERVENTIONS:  - Monitor percentage of each meal consumed  - Identify factors contributing to decreased intake, treat as appropriate  - Assist with meals as needed  - Monitor I&O, weight, and lab values if indicated  - Obtain nutrition services referral as needed  Outcome: Progressing  Goal: Establish and maintain optimal ostomy function  Description: INTERVENTIONS:  - Assess bowel function  - Encourage oral fluids to ensure adequate hydration  - Administer IV fluids if ordered to ensure adequate hydration - Administer ordered medications as needed  - Encourage mobilization and activity  - Nutrition services referral to assist patient with appropriate food choices  - Assess stoma site  - Consider wound care consult   Outcome: Progressing  Goal: Oral mucous membranes remain intact  Description: INTERVENTIONS  - Assess oral mucosa and hygiene practices  - Implement preventative oral hygiene regimen  - Implement oral medicated treatments as ordered  - Initiate Nutrition services referral as needed  Outcome: Progressing     Problem: GENITOURINARY - ADULT  Goal: Maintains or returns to baseline urinary function  Description: INTERVENTIONS:  - Assess urinary function  - Encourage oral fluids to ensure adequate hydration if ordered  - Administer IV fluids as ordered to ensure adequate hydration  - Administer ordered medications as needed  - Offer frequent toileting  - Follow urinary retention protocol if ordered  Outcome: Progressing  Goal: Absence of urinary retention  Description: INTERVENTIONS:  - Assess patients ability to void and empty bladder  - Monitor I/O  - Bladder scan as needed  - Discuss with physician/AP medications to alleviate retention as needed  - Discuss catheterization for long term situations as appropriate  Outcome: Progressing  Goal: Urinary catheter remains patent  Description: INTERVENTIONS:  - Assess patency of urinary catheter  - If patient has a chronic morales, consider changing catheter if non-functioning  - Follow guidelines for intermittent irrigation of non-functioning urinary catheter  Outcome: Progressing     Problem: METABOLIC, FLUID AND ELECTROLYTES - ADULT  Goal: Electrolytes maintained within normal limits  Description: INTERVENTIONS:  - Monitor labs and assess patient for signs and symptoms of electrolyte imbalances  - Administer electrolyte replacement as ordered  - Monitor response to electrolyte replacements, including repeat lab results as appropriate  - Instruct patient on fluid and nutrition as appropriate  Outcome: Progressing  Goal: Fluid balance maintained  Description: INTERVENTIONS:  - Monitor labs   - Monitor I/O and WT  - Instruct patient on fluid and nutrition as appropriate  - Assess for signs & symptoms of volume excess or deficit  Outcome: Progressing  Goal: Glucose maintained within target range  Description: INTERVENTIONS:  - Monitor Blood Glucose as ordered  - Assess for signs and symptoms of hyperglycemia and hypoglycemia  - Administer ordered medications to maintain glucose within target range  - Assess nutritional intake and initiate nutrition service referral as needed  Outcome: Progressing     Problem: HEMATOLOGIC - ADULT  Goal: Maintains hematologic stability  Description: INTERVENTIONS  - Assess for signs and symptoms of bleeding or hemorrhage  - Monitor labs  - Administer supportive blood products/factors as ordered and appropriate  Outcome: Progressing     Problem: MUSCULOSKELETAL - ADULT  Goal: Maintain or return mobility to safest level of function  Description: INTERVENTIONS:  - Assess patient's ability to carry out ADLs; assess patient's baseline for ADL function and identify physical deficits which impact ability to perform ADLs (bathing, care of mouth/teeth, toileting, grooming, dressing, etc )  - Assess/evaluate cause of self-care deficits   - Assess range of motion  - Assess patient's mobility  - Assess patient's need for assistive devices and provide as appropriate  - Encourage maximum independence but intervene and supervise when necessary  - Involve family in performance of ADLs  - Assess for home care needs following discharge   - Consider OT consult to assist with ADL evaluation and planning for discharge  - Provide patient education as appropriate  Outcome: Progressing  Goal: Maintain proper alignment of affected body part  Description: INTERVENTIONS:  - Support, maintain and protect limb and body alignment  - Provide patient/ family with appropriate education  Outcome: Progressing     Problem: PAIN - ADULT  Goal: Verbalizes/displays adequate comfort level or baseline comfort level  Description: Interventions:  - Encourage patient to monitor pain and request assistance  - Assess pain using appropriate pain scale  - Administer analgesics based on type and severity of pain and evaluate response  - Implement non-pharmacological measures as appropriate and evaluate response  - Consider cultural and social influences on pain and pain management  - Notify physician/advanced practitioner if interventions unsuccessful or patient reports new pain  Outcome: Progressing     Problem: INFECTION - ADULT  Goal: Absence or prevention of progression during hospitalization  Description: INTERVENTIONS:  - Assess and monitor for signs and symptoms of infection  - Monitor lab/diagnostic results  - Monitor all insertion sites, i e  indwelling lines, tubes, and drains  - Monitor endotracheal if appropriate and nasal secretions for changes in amount and color  - [place] appropriate cooling/warming therapies per order  - Administer medications as ordered  - Instruct and encourage patient and family to use good hand hygiene technique  - Identify and instruct in appropriate isolation precautions for identified infection/condition  Outcome: Progressing  Goal: Absence of fever/infection during neutropenic period  Description: INTERVENTIONS:  - Monitor WBC    Outcome: Progressing     Problem: SAFETY ADULT  Goal: Patient will remain free of falls  Description: INTERVENTIONS:  - Educate patient/family on patient safety including physical limitations  - Instruct patient to call for assistance with activity   - Consult OT/PT to assist with strengthening/mobility   - Keep Call bell within reach  - Keep bed low and locked with side rails adjusted as appropriate  - Keep care items and personal belongings within reach  - Initiate and maintain comfort rounds  - Make Fall Risk Sign visible to staff  - Offer Toileting every  Hours, in advance of need  - Initiate/Maintain alarm  - Obtain necessary fall risk management equipment:   - Apply yellow socks and bracelet for high fall risk patients  - Consider moving patient to room near nurses station  Outcome: Progressing  Goal: Maintain or return to baseline ADL function  Description: INTERVENTIONS:  -  Assess patient's ability to carry out ADLs; assess patient's baseline for ADL function and identify physical deficits which impact ability to perform ADLs (bathing, care of mouth/teeth, toileting, grooming, dressing, etc )  - Assess/evaluate cause of self-care deficits   - Assess range of motion  - Assess patient's mobility; develop plan if impaired  - Assess patient's need for assistive devices and provide as appropriate  - Encourage maximum independence but intervene and supervise when necessary  - Involve family in performance of ADLs  - Assess for home care needs following discharge   - Consider OT consult to assist with ADL evaluation and planning for discharge  - Provide patient education as appropriate  Outcome: Progressing  Goal: Maintains/Returns to pre admission functional level  Description: INTERVENTIONS:  - Perform BMAT or MOVE assessment daily    - Set and communicate daily mobility goal to care team and patient/family/caregiver  - Collaborate with rehabilitation services on mobility goals if consulted  - Perform Range of Motion  times a day  - Reposition patient every  hours    - Dangle patient  times a day  - Stand patient  times a day  - Ambulate patient  times a day  - Out of bed to chair  times a day   - Out of bed for meals times a day  - Out of bed for toileting  - Record patient progress and toleration of activity level   Outcome: Progressing     Problem: DISCHARGE PLANNING  Goal: Discharge to home or other facility with appropriate resources  Description: INTERVENTIONS:  - Identify barriers to discharge w/patient and caregiver  - Arrange for needed discharge resources and transportation as appropriate  - Identify discharge learning needs (meds, wound care, etc )  - Arrange for interpretive services to assist at discharge as needed  - Refer to Case Management Department for coordinating discharge planning if the patient needs post-hospital services based on physician/advanced practitioner order or complex needs related to functional status, cognitive ability, or social support system  Outcome: Progressing     Problem: Knowledge Deficit  Goal: Patient/family/caregiver demonstrates understanding of disease process, treatment plan, medications, and discharge instructions  Description: Complete learning assessment and assess knowledge base    Interventions:  - Provide teaching at level of understanding  - Provide teaching via preferred learning methods  Outcome: Progressing
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
## 2021-08-29 NOTE — PROGRESS NOTES
NEPHROLOGY PROGRESS NOTE   Milena Rivera 64 y o  female MRN: [id number]  Unit/Bed#: 7T [facility] 708-01 Encounter: [id number]      ASSESSMENT/PLAN:  1  CKD stage 4:  Baseline creatinine 2 3-2 6 since May 2021 and follows with Dr Rosi Garrido   · Creatinine today is 2 4 which is trending up slightly but still in her baseline  · CKD due to diabetes, hypertension and obesity  · Plan for cardiac catheterization on Monday  · Patient aware of the risk of contrast induced nephropathy  · Will hold diuretics tomorrow morning  · Will start saline at 50 cc/hour 6 hours pre and post cardiac catheterization  · Check a m  BMP  2  Hypertension:  Blood pressure acceptable  3  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:  Status post IV diuretics and now on torsemide 20 mg p o  B i d  Per Cardiology  · Getting cardiac catheterization on Monday  4  Anemia chronic disease:  Hemoglobin stable at 8 8  5  Neurogenic bladder:  Status post suprapubic catheter  6  Nephrotic range proteinuria:  Due to diabetic nephropathy and possible FSGS in the setting of obesity  Workup without monoclonal gammopathy  7  History CAD status post PCI x5 in 2012 and 2017    Plan Summary:    [facility] tomorrow    Hold torsemide tomorrow am    Start saline at 50cc/h 6 hours pre cath and continue 4-6h post cath    Check am BMP     SUBJECTIVE:  Feeling well today  No chest pain, shortness of breath, nausea, vomiting or diarrhea  She was walking around the room and getting cleaned up and had no dyspnea with exertion      OBJECTIVE:  Current Weight: Weight - Scale: 133 kg (292 lb 15 9 oz)  Vitals:    08/29/21 0714   BP:    Pulse: 62   Resp: 17   Temp: (!) 96 1 °F (35 6 °C)   SpO2: 93%       Intake/Output Summary (Last 24 hours) at 8/29/2021 1407  Last data filed at 8/29/2021 1316  Gross per 24 hour   Intake 1000 ml   Output 2300 ml   Net -1300 ml     General:  No acute distress  Skin:  No rash  Eyes:  Sclerae anicteric  ENT:  Moist mucous membranes  Neck:  Trachea midline with no JVD  Chest:  Clear to auscultation bilaterally  CVS:  Regular rate and rhythm  Abdomen:  Soft, nontender, nondistended  Extremities:  trace edema  Neuro:  Awake and alert  Psych:  Appropriate affect      Medications:  Scheduled Meds:  Current Facility-Administered Medications   Medication Dose Route Frequency Provider Last Rate    amLODIPine  5 mg Oral Daily Kera Quiroz MD      aspirin  81 mg Oral Daily Catarino Washington MD      atorvastatin  40 mg Oral After Dinner Catarino Washington MD      carvedilol  25 mg Oral BID With Meals Payton Champion MD      cefTRIAXone  1,000 mg Intravenous Q24H Payton Champion MD Stopped (08/28/21 2133)    cholecalciferol  2,000 Units Oral Daily Duane Rankin MD      clopidogrel  75 mg Oral Daily Catarino Washington MD      dicyclomine  10 mg Oral 4x Daily (AC & HS) Naeem Brooks MD      docusate sodium  100 mg Oral BID Catarino Washington MD      ferrous sulfate  325 mg Oral Every Other Day Payton Champion MD      heparin (porcine)  5,000 Units Subcutaneous Q8H [facility] Catarino Washington MD      HYDROcodone-acetaminophen  1 tablet Oral BID PRN Payton Champion MD      insulin glargine  50 Units Subcutaneous Q12H [facility] Rossi Hughes MD      insulin lispro  2-12 Units Subcutaneous TID AC Catarino Washington MD      isosorbide mononitrate  30 mg Oral Daily Catarino Washington MD      levothyroxine  50 mcg Oral Daily Catarino Washington MD      nitroglycerin  0 4 mg Sublingual Q5 Min PRN Catarino Washington MD      nystatin   Topical BID Rossi Hughes MD      ondansetron  4 mg Oral Q6H PRN Payton Champion MD      promethazine  25 mg Intravenous Q12H PRN Law Stephenson MD      saccharomyces boulardii  250 mg Oral BID Naeem Brooks MD      torsemide  20 mg Oral BID (diuretic) Carriegricelda Marmolejo DO         PRN Meds:  HYDROcodone-acetaminophen    nitroglycerin    ondansetron    promethazine      Laboratory Results:  Results from last 7 days   Lab Units 08/29/21  0750 08/29/21  0503 08/28/21  0447 08/27/21  0454 08/26/21  0444 08/25/21  0507 08/24/21  0430 08/23/21  2046   WBC Thousand/uL 10 00  --  9 60 8 00 8 30 8 20 9 60 11 00   HEMOGLOBIN g/dL 8 8*  --  9 2* 8 7* 9 0* 8 7* 8 5* 8 6*   HEMATOCRIT % 26 1*  --  28 3* 26 5* 26 7* 26 0* 25 1* 25 7*   PLATELETS Thousands/uL 224  --  219 210 220 213 220 230   SODIUM mmol/L  --  141 142 142 140 141 139 141   POTASSIUM mmol/L  --  3 8 4 0 4 2 4 5 4 8 4 5 4 9   CHLORIDE mmol/L  --  104 105 106 106 109* 109* 111*   CO2 mmol/L  --  29 28 28 24 26 22 22   BUN mg/dL  --  57* 54* 54* 56* 57* 57* 56*   CREATININE mg/dL  --  2 44* 2 22* 2 13* 2 31* 2 41* 2 35* 2 37*   CALCIUM mg/dL  --  8 6 8 9 9 0 8 9 8 9 8 3* 8 3*   MAGNESIUM mg/dL  --  1 7 1 7 1 7 1 9  --  2 2 2 0   PHOSPHORUS mg/dL  --  5 8* 5 4* 5 5* 5 6*  --  5 1*  --

## 2021-08-29 NOTE — ASSESSMENT & PLAN NOTE
· History of CAD with multiple stents placed in 2012 and 2017  · Continue Imdur, Plavix, aspirin and Coreg  · Continue daily statin  · Plan for cardiac catheterization tomorrow

## 2021-08-29 NOTE — PLAN OF CARE
Problem: Potential for Falls  Goal: Patient will remain free of falls  Description: INTERVENTIONS:  - Educate patient/family on patient safety including physical limitations  - Instruct patient to call for assistance with activity   - Consult OT/PT to assist with strengthening/mobility   - Keep Call bell within reach  - Keep bed low and locked with side rails adjusted as appropriate  - Keep care items and personal belongings within reach  - Initiate and maintain comfort rounds  - Make Fall Risk Sign visible to staff  - Apply yellow socks and bracelet for high fall risk patients  - Consider moving patient to room near nurses station  Outcome: Progressing     Problem: MOBILITY - ADULT  Goal: Maintain or return to baseline ADL function  Description: INTERVENTIONS:  -  Assess patient's ability to carry out ADLs; assess patient's baseline for ADL function and identify physical deficits which impact ability to perform ADLs (bathing, care of mouth/teeth, toileting, grooming, dressing, etc )  - Assess/evaluate cause of self-care deficits   - Assess range of motion  - Assess patient's mobility; develop plan if impaired  - Assess patient's need for assistive devices and provide as appropriate  - Encourage maximum independence but intervene and supervise when necessary  - Involve family in performance of ADLs  - Assess for home care needs following discharge   - Consider OT consult to assist with ADL evaluation and planning for discharge  - Provide patient education as appropriate  Outcome: Progressing     Problem: MOBILITY - ADULT  Goal: Maintains/Returns to pre admission functional level  Description: INTERVENTIONS:  - Perform BMAT or MOVE assessment daily    - Set and communicate daily mobility goal to care team and patient/family/caregiver     - Collaborate with rehabilitation services on mobility goals if consulted  - Out of bed for toileting  - Record patient progress and toleration of activity level   Outcome: Progressing     Problem: PAIN - ADULT  Goal: Verbalizes/displays adequate comfort level or baseline comfort level  Description: Interventions:  - Encourage patient to monitor pain and request assistance  - Assess pain using appropriate pain scale  - Administer analgesics based on type and severity of pain and evaluate response  - Implement non-pharmacological measures as appropriate and evaluate response  - Consider cultural and social influences on pain and pain management  - Notify physician/advanced practitioner if interventions unsuccessful or patient reports new pain  Outcome: Progressing     Problem: INFECTION - ADULT  Goal: Absence or prevention of progression during hospitalization  Description: INTERVENTIONS:  - Assess and monitor for signs and symptoms of infection  - Monitor lab/diagnostic results  - Monitor all insertion sites, i e  indwelling lines, tubes, and drains  - Monitor endotracheal if appropriate and nasal secretions for changes in amount and color  - Lolo appropriate cooling/warming therapies per order  - Administer medications as ordered  - Instruct and encourage patient and family to use good hand hygiene technique  - Identify and instruct in appropriate isolation precautions for identified infection/condition  Outcome: Progressing     Problem: DISCHARGE PLANNING  Goal: Discharge to home or other facility with appropriate resources  Description: INTERVENTIONS:  - Identify barriers to discharge w/patient and caregiver  - Arrange for needed discharge resources and transportation as appropriate  - Identify discharge learning needs (meds, wound care, etc )  - Arrange for interpretive services to assist at discharge as needed  - Refer to Case Management Department for coordinating discharge planning if the patient needs post-hospital services based on physician/advanced practitioner order or complex needs related to functional status, cognitive ability, or social support system  Outcome: Progressing     Problem: Knowledge Deficit  Goal: Patient/family/caregiver demonstrates understanding of disease process, treatment plan, medications, and discharge instructions  Description: Complete learning assessment and assess knowledge base    Interventions:  - Provide teaching at level of understanding  - Provide teaching via preferred learning methods  Outcome: Progressing     Problem: CARDIOVASCULAR - ADULT  Goal: Maintains optimal cardiac output and hemodynamic stability  Description: INTERVENTIONS:  - Monitor I/O, vital signs and rhythm  - Monitor for S/S and trends of decreased cardiac output  - Administer and titrate ordered vasoactive medications to optimize hemodynamic stability  - Assess quality of pulses, skin color and temperature  - Assess for signs of decreased coronary artery perfusion  - Instruct patient to report change in severity of symptoms  Outcome: Progressing     Problem: GENITOURINARY - ADULT  Goal: Urinary catheter remains patent  Description: INTERVENTIONS:  - Assess patency of urinary catheter  - If patient has a chronic morales, consider changing catheter if non-functioning  - Follow guidelines for intermittent irrigation of non-functioning urinary catheter  Outcome: Progressing

## 2021-08-29 NOTE — PROGRESS NOTES
The patient was seen and examined by me  I have reviewed and discussed the case with Magdalena Mujica, and agree with findings and assessment and plan  Please see below for addendum and comments  Please see previous progress note as documented at [facility]  [place] and [place] [facility]  1  CKD stage 4 baseline creatinine 2 3-2 6, follows with Dr Kandy Meyers  2  Hypertension, blood pressure stable  3  Cardiomyopathy ejection fraction 45%, status post diuresis, volume status appears stable  4  Nephrotic range proteinuria most likely secondary to diabetic nephropathy plus focal segmental glomerulosclerosis given obesity  5  Coronary artery disease with history of PCI, planning for repeat cardiac catheterization    Volume status overall appears stable  Hold torsemide  Start IV fluids pre cardiac catheterization, would discontinue 6 hours post procedure  No other changes in current regimen    /58 (BP Location: Right arm)   Pulse 67   Temp 97 7 °F (36 5 °C) (Temporal)   Resp 16   SpO2 94%       General:  Comfortable, no acute distress, does not appear short of breath  Currently sitting in a chair    Extremities:  No significant edema

## 2021-08-29 NOTE — ASSESSMENT & PLAN NOTE
· Hemoglobin 8 8 today  · Likely component of CKD stage 4  · Continue iron  · Fecal occult blood pending  · Continue daily monitoring

## 2021-08-29 NOTE — ASSESSMENT & PLAN NOTE
Lab Results   Component Value Date    EGFR 21 (L) 08/29/2021    EGFR 24 (L) 08/28/2021    EGFR 25 (L) 08/27/2021    CREATININE 2 44 (H) 08/29/2021    CREATININE 2 22 (H) 08/28/2021    CREATININE 2 13 (H) 08/27/2021     · History of CKD stage 4   · Creatinine stable at 2 44, baseline 2 2-2 6   · Nephrology following  · Will need close monitoring after cardiac catheterization  · Gentle IV fluids pre and post catheterization

## 2021-08-29 NOTE — QUICK NOTE
Subjective:   Patient was seen at beside with nurse  Patient has been having nausea and "abdominal spasms" for the last few hours  Pain is located in the lower abdomen/pelvic area  Patient reports that she had one episode of loose stool  Patient denies any bloating, diaphoresis, chest pain or shortness of breath  Objective:   Patient was examined in her room, she was sitting comfortably in her chair  Consitutional: She is in no acute distress  Cardiac: Normal Rate and rhythm, no murmurs, gallops or rubs  Abdominal: Bowel sounds present in all four quadrants  Soft and not distended  Tender to palpation in lower left and right abdomen region     : Urinary catheter     Assessment:   Antibiotic associated GI discomfort vs bladder spasm     Plan:   · Bentyl 10 mg 4 times daily  · Florastor 250 mg BID for GI prophylaxis due to continued Rochephin usage  · If does not improve on Bentyl consider using Ditropan for bladder spasm   · Monitor diarrheal symptoms

## 2021-08-29 NOTE — ASSESSMENT & PLAN NOTE
· History of suprapubic catheter with neurogenic bladder   · Was found to have positive UA at time of admission  · Received IV ceftriaxone  · Urology consult for possible catheter exchange

## 2021-08-30 LAB
ANION GAP SERPL CALCULATED.3IONS-SCNC: 10 MMOL/L (ref 4–13)
BUN SERPL-MCNC: 57 MG/DL (ref 5–25)
CALCIUM SERPL-MCNC: 8.2 MG/DL (ref 8.3–10.1)
CHLORIDE SERPL-SCNC: 105 MMOL/L (ref 100–108)
CO2 SERPL-SCNC: 26 MMOL/L (ref 21–32)
CREAT SERPL-MCNC: 2.6 MG/DL (ref 0.6–1.3)
GFR SERPL CREATININE-BSD FRML MDRD: 20 ML/MIN/1.73SQ M
GLUCOSE SERPL-MCNC: 102 MG/DL (ref 65–140)
GLUCOSE SERPL-MCNC: 132 MG/DL (ref 65–140)
GLUCOSE SERPL-MCNC: 132 MG/DL (ref 65–140)
GLUCOSE SERPL-MCNC: 170 MG/DL (ref 65–140)
GLUCOSE SERPL-MCNC: 236 MG/DL (ref 65–140)
POTASSIUM SERPL-SCNC: 3.5 MMOL/L (ref 3.5–5.3)
SODIUM SERPL-SCNC: 141 MMOL/L (ref 136–145)

## 2021-08-30 PROCEDURE — 80048 BASIC METABOLIC PNL TOTAL CA: CPT | Performed by: PHYSICIAN ASSISTANT

## 2021-08-30 PROCEDURE — 82948 REAGENT STRIP/BLOOD GLUCOSE: CPT

## 2021-08-30 PROCEDURE — 99223 1ST HOSP IP/OBS HIGH 75: CPT | Performed by: INTERNAL MEDICINE

## 2021-08-30 PROCEDURE — 99232 SBSQ HOSP IP/OBS MODERATE 35: CPT | Performed by: INTERNAL MEDICINE

## 2021-08-30 PROCEDURE — 99222 1ST HOSP IP/OBS MODERATE 55: CPT | Performed by: PHYSICIAN ASSISTANT

## 2021-08-30 PROCEDURE — 99222 1ST HOSP IP/OBS MODERATE 55: CPT | Performed by: INTERNAL MEDICINE

## 2021-08-30 RX ORDER — ACETYLCYSTEINE 200 MG/ML
600 SOLUTION ORAL; RESPIRATORY (INHALATION) 2 TIMES DAILY
Status: COMPLETED | OUTPATIENT
Start: 2021-08-30 | End: 2021-09-01

## 2021-08-30 RX ORDER — SODIUM CHLORIDE 9 MG/ML
50 INJECTION, SOLUTION INTRAVENOUS CONTINUOUS
Status: DISCONTINUED | OUTPATIENT
Start: 2021-08-31 | End: 2021-08-31

## 2021-08-30 RX ORDER — POTASSIUM CHLORIDE 20 MEQ/1
20 TABLET, EXTENDED RELEASE ORAL ONCE
Status: COMPLETED | OUTPATIENT
Start: 2021-08-30 | End: 2021-08-30

## 2021-08-30 RX ADMIN — HEPARIN SODIUM 5000 UNITS: 5000 INJECTION INTRAVENOUS; SUBCUTANEOUS at 22:03

## 2021-08-30 RX ADMIN — DICYCLOMINE HYDROCHLORIDE 10 MG: 10 CAPSULE ORAL at 06:36

## 2021-08-30 RX ADMIN — SODIUM CHLORIDE 50 ML/HR: 0.9 INJECTION, SOLUTION INTRAVENOUS at 00:25

## 2021-08-30 RX ADMIN — ACETYLCYSTEINE 600 MG: 200 SOLUTION ORAL; RESPIRATORY (INHALATION) at 16:25

## 2021-08-30 RX ADMIN — INSULIN LISPRO 2 UNITS: 100 INJECTION, SOLUTION INTRAVENOUS; SUBCUTANEOUS at 16:33

## 2021-08-30 RX ADMIN — INSULIN GLARGINE 25 UNITS: 100 INJECTION, SOLUTION SUBCUTANEOUS at 08:20

## 2021-08-30 RX ADMIN — NYSTATIN: 100000 POWDER TOPICAL at 16:32

## 2021-08-30 RX ADMIN — DOCUSATE SODIUM 100 MG: 100 CAPSULE, LIQUID FILLED ORAL at 16:31

## 2021-08-30 RX ADMIN — Medication 250 MG: at 16:32

## 2021-08-30 RX ADMIN — POTASSIUM CHLORIDE 20 MEQ: 1500 TABLET, EXTENDED RELEASE ORAL at 11:46

## 2021-08-30 RX ADMIN — HYDROCODONE BITARTRATE AND ACETAMINOPHEN 1 TABLET: 5; 325 TABLET ORAL at 08:22

## 2021-08-30 RX ADMIN — ACETAMINOPHEN 650 MG: 325 TABLET, FILM COATED ORAL at 20:17

## 2021-08-30 RX ADMIN — HEPARIN SODIUM 5000 UNITS: 5000 INJECTION INTRAVENOUS; SUBCUTANEOUS at 06:36

## 2021-08-30 RX ADMIN — DICYCLOMINE HYDROCHLORIDE 10 MG: 10 CAPSULE ORAL at 11:46

## 2021-08-30 RX ADMIN — Medication 2000 UNITS: at 08:21

## 2021-08-30 RX ADMIN — INSULIN GLARGINE 50 UNITS: 100 INJECTION, SOLUTION SUBCUTANEOUS at 20:17

## 2021-08-30 RX ADMIN — ISOSORBIDE MONONITRATE 30 MG: 30 TABLET, EXTENDED RELEASE ORAL at 08:22

## 2021-08-30 RX ADMIN — DICYCLOMINE HYDROCHLORIDE 10 MG: 10 CAPSULE ORAL at 22:03

## 2021-08-30 RX ADMIN — CEFTRIAXONE 1000 MG: 1 INJECTION, POWDER, FOR SOLUTION INTRAMUSCULAR; INTRAVENOUS at 20:16

## 2021-08-30 RX ADMIN — AMLODIPINE BESYLATE 5 MG: 5 TABLET ORAL at 08:23

## 2021-08-30 RX ADMIN — FERROUS SULFATE TAB 325 MG (65 MG ELEMENTAL FE) 325 MG: 325 (65 FE) TAB at 11:46

## 2021-08-30 RX ADMIN — CARVEDILOL 25 MG: 25 TABLET, FILM COATED ORAL at 08:22

## 2021-08-30 RX ADMIN — ATORVASTATIN CALCIUM 40 MG: 40 TABLET, FILM COATED ORAL at 16:31

## 2021-08-30 RX ADMIN — CARVEDILOL 25 MG: 25 TABLET, FILM COATED ORAL at 16:25

## 2021-08-30 RX ADMIN — LEVOTHYROXINE SODIUM 50 MCG: 50 TABLET ORAL at 06:36

## 2021-08-30 RX ADMIN — CLOPIDOGREL BISULFATE 75 MG: 75 TABLET ORAL at 08:23

## 2021-08-30 RX ADMIN — NYSTATIN: 100000 POWDER TOPICAL at 08:24

## 2021-08-30 RX ADMIN — HEPARIN SODIUM 5000 UNITS: 5000 INJECTION INTRAVENOUS; SUBCUTANEOUS at 14:30

## 2021-08-30 RX ADMIN — DOCUSATE SODIUM 100 MG: 100 CAPSULE, LIQUID FILLED ORAL at 08:22

## 2021-08-30 RX ADMIN — ASPIRIN 81 MG: 81 TABLET ORAL at 08:21

## 2021-08-30 RX ADMIN — Medication 250 MG: at 08:22

## 2021-08-30 RX ADMIN — DICYCLOMINE HYDROCHLORIDE 10 MG: 10 CAPSULE ORAL at 16:25

## 2021-08-30 NOTE — CONSULTS
NEPHROLOGY PROGRESS NOTE   Tamera Hdz 64 y o  female MRN: [id number]  Unit/Bed#: E4 -01 Encounter: [id number]  Reason for Consult:  CKD stage IV    Plan:  -CKD stage 4, for cardiac catheterization today  Monitoring for RAMESH  Currently receiving preparation with IV hydration  Holding diuretic   -neurogenic bladder, with suprapubic catheter  Urology team consulted for symptomatic UA  -hypertension, continue antihypertensives as below   -combined CHF, newly diagnosed with EF of 45%  For cardiac catheterization later today  Cardiology team continues to follow  Previously diuresed with IV diuretics  Transitioned to torsemide 20 mg p o  B i d  (currently on hold for cardiac cath)    ASSESSMENT/PLAN:  CKD stage 4:  Etiology of chronic disease likely secondary to diabetic nephropathy, hypertensive nephrosclerosis, and obesity related FSGS  -baseline creatinine 2 3-2 6 since May 2021   -follows with Dr Fani Ayala   -planning on cardiac cath today   -will monitor for RAMESH  Check BMP 48 hours after contrast administration  Hold diuretics this morning   -started on saline 50 cc/hour 6 hours pre and post catheterization  -recommend minimizing contrast load   -most recent UA showed nitrites, greater than 500 protein, 20-30 RBCs, 30-50 WBCs, innumerable bacteria   -most recent CT showed vascular calcifications versus small stones   -avoid further nephrotoxins, hypotension   -strict I/O  Neurogenic bladder:  With suprapubic catheter   -most recent urine microalbumin to creatinine ratio 5 2 g    -urology team consulted for symptomatic UA  ? Need for catheter exchange  Nephrotic range proteinuria:  Suspected secondary to diabetic nephropathy and FSGS  -previous workup negative for monoclonal gammopathy   -will continue to follow as outpatient  Hypertension:  Blood pressure remains stable and acceptable    Above goal this morning   -home medications:  Amlodipine 5 mg every day, isosorbide 30 mg daily, Coreg 25 mg 2 times per day  -recommend avoiding hypotension or high fluctuations in blood pressure   -recommend holding parameters for antihypertensive for systolic blood pressure less than 130 mmHg  Combined CHF: (newly diagnosed) with EF of 45% and grade 2 diastolic dysfunction  -previously on IV diuretics, now on torsemide 20 mg p o  B i d  -will hold diuretics this morning in preparation for cardiac catheterization   -cardiology team following   -recommend daily weights, fluid restriction, I/O  History of CAD:  Status post PCI x5 in 2012 and 2017    -planning repeat cardiac catheterization today  Disposition:  Requiring additional stay due to medical needs  SUBJECTIVE:  The patient is resting in her bed  She appears to be comfortable  She reports feeling anxious for her procedure later today  She denies chest discomfort or shortness of breath  She states she is slightly nauseous and attributes this to her nerves      OBJECTIVE:  Current Weight: Weight - Scale: 127 kg (279 lb 12 2 oz)  Vitals:    08/29/21 2257 08/30/21 0300 08/30/21 0600 08/30/21 0700   BP: 142/62   (!) 173/76   BP Location: Left arm   Left arm   Pulse: 69   68   Resp: 16   18   Temp: 97 6 °F (36 4 °C)   97 8 °F (36 6 °C)   TempSrc: Temporal   Temporal   SpO2: 93%   96%   Weight:   127 kg (279 lb 12 2 oz)    Height:  5' 8" (1 727 m)         Intake/Output Summary (Last 24 hours) at 8/30/2021 0815  Last data filed at 8/30/2021 0700  Gross per 24 hour   Intake 810 ml   Output 1575 ml   Net -765 ml     General: NAD  Skin: warm, dry, intact, no rash  HEENT: Moist mucous membranes, sclera anicteric, normocephalic, atraumatic  Neck: No apparent JVD appreciated  Chest: lung sounds clear B/L, on RA   CVS:Regular rate and rhythm, no murmer   Abdomen: Soft, round, non-tender, +BS, obese  Extremities: trace B/L LE edema present  Neuro: alert and oriented  Psych: appropriate mood and affect, anxious     Medications:    Current Facility-Administered Medications:     acetaminophen (TYLENOL) tablet 650 mg, 650 mg, Oral, Q6H PRN, Javier Lovell PA-C, 650 mg at 08/29/21 2113    amLODIPine (NORVASC) tablet 5 mg, 5 mg, Oral, Daily, Kasey Bonilla PA-C    aspirin (ECOTRIN LOW STRENGTH) EC tablet 81 mg, 81 mg, Oral, Daily, Kasey Bonilla PA-C    atorvastatin (LIPITOR) tablet 40 mg, 40 mg, Oral, After Dinner, Kasey Bonilla PA-C, 40 mg at 08/29/21 1703    carvedilol (COREG) tablet 25 mg, 25 mg, Oral, BID With Meals, Kasey Bonilla PA-C, 25 mg at 08/29/21 1703    cefTRIAXone (ROCEPHIN) 1,000 mg in dextrose 5 % 50 mL IVPB, 1,000 mg, Intravenous, Q24H, Kasey Bonilla PA-C, Last Rate: 100 mL/hr at 08/29/21 2228, 1,000 mg at 08/29/21 2228    cholecalciferol (VITAMIN D3) tablet 2,000 Units, 2,000 Units, Oral, Daily, Kasey Bonilla PA-C    clopidogrel (PLAVIX) tablet 75 mg, 75 mg, Oral, Daily, Kasey Bonilla PA-C    dicyclomine (BENTYL) capsule 10 mg, 10 mg, Oral, 4x Daily (AC & HS), Kasey Bonilla PA-C, 10 mg at 08/30/21 0636    docusate sodium (COLACE) capsule 100 mg, 100 mg, Oral, BID, Kasey Bonilla PA-C, 100 mg at 08/29/21 1703    ferrous sulfate tablet 325 mg, 325 mg, Oral, Every Other Day, Kasey Bonilla PA-C    heparin (porcine) subcutaneous injection 5,000 Units, 5,000 Units, Subcutaneous, Q8H [address], Kasey Bonilla PA-C, 5,000 Units at 08/30/21 0636    HYDROcodone-acetaminophen (NORCO) 5-325 mg per tablet 1 tablet, 1 tablet, Oral, BID PRN, Kasey Bonilla PA-C    insulin glargine (LANTUS) subcutaneous injection 50 Units 0 5 mL, 50 Units, Subcutaneous, Q12H [address], Kasey Bonilla PA-C, 50 Units at 08/29/21 2113    insulin lispro (HumaLOG) 100 units/mL subcutaneous injection 2-12 Units, 2-12 Units, Subcutaneous, TID AC **AND** Fingerstick Glucose (POCT), , , 4x Daily AC and at bedtime, Kasey Bonilla PA-C    isosorbide mononitrate (IMDUR) 24 hr tablet 30 mg, 30 mg, Oral, Daily, Kasey Bonilla, SETH    levothyroxine tablet 50 mcg, 50 mcg, Oral, Early Morning, Asher De Anda PA-C, 50 mcg at 08/30/21 0636    nitroglycerin (NITROSTAT) SL tablet 0 4 mg, 0 4 mg, Sublingual, Q5 Min PRN, Asher De Anda PA-C    nystatin (MYCOSTATIN) powder, , Topical, BID, Asher De Anda PA-C, Given at 08/29/21 1703    ondansetron (ZOFRAN-ODT) dispersible tablet 4 mg, 4 mg, Oral, Q6H PRN, Asher De Anda PA-C    promethazine (PHENERGAN) injection 25 mg, 25 mg, Intravenous, Q12H PRN, Asher De Anda PA-C    saccharomyces boulardii (FLORASTOR) capsule 250 mg, 250 mg, Oral, BID, Asher De Anda PA-C, 250 mg at 08/29/21 1703    sodium chloride 0 9 % infusion, 50 mL/hr, Intravenous, Continuous, Dayana Henson DO, Last Rate: 50 mL/hr at 08/30/21 0025, 50 mL/hr at 08/30/21 0025    Laboratory Results:  Results from last 7 days   Lab Units 08/30/21  0516 08/29/21  0750 08/29/21  0503 08/28/21  0447 08/27/21  0454 08/25/21  0507   WBC Thousand/uL  --  10 00  --  9 60 8 00 8 20   HEMOGLOBIN g/dL  --  8 8*  --  9 2* 8 7* 8 7*   HEMATOCRIT %  --  26 1*  --  28 3* 26 5* 26 0*   PLATELETS Thousands/uL  --  224  --  219 210 213   SODIUM mmol/L 141  --  141 142 142 141   POTASSIUM mmol/L 3 5  --  3 8 4 0 4 2 4 8   CHLORIDE mmol/L 105  --  104 105 106 109*   CO2 mmol/L 26  --  29 28 28 26   BUN mg/dL 57*  --  57* 54* 54* 57*   CREATININE mg/dL 2 60*  --  2 44* 2 22* 2 13* 2 41*   CALCIUM mg/dL 8 2*  --  8 6 8 9 9 0 8 9   MAGNESIUM mg/dL  --   --  1 7 1 7 1 7  --    PHOSPHORUS mg/dL  --   --  5 8* 5 4* 5 5*  --    ALK PHOS U/L  --   --   --  101 98 99   ALT U/L  --   --   --  15 16 14   AST U/L  --   --   --  28 27 21

## 2021-08-30 NOTE — CONSULTS
Consultation - Urology  Jasmin Gentile 64 y o  female MRN: [id number]  Unit/Bed#: E4 -01 Encounter: [id number]    Reason for Consult:  Urinary tract infection, suprapubic catheter tube exchange      Assessment/Plan:  21-year-old female admitted for new onset of congestive heart failure, CKD stage 4, CAD, HTN, chronic anemia with insulin-dependent type 2 DM with polyneuropathy and resulting neurogenic bladder    Afebrile, VSS  Urine output over 1 5 L recorded  No leukocytosis, hemoglobin 8 8 around baseline  Creatinine 2 60 (2 44) (1 92)    16 Fr silicone catheter exchanged at bedside using Betadine prep and sterile technique  200 cc of straw yellow urine recorded from the old [place] bag  Patient tolerated well    Continue IV antibiotics  Continue to monitor creatinine and CBC, recommend nephrology consult if creatinine continues to increase  Will discuss with attending    HPI:    Jasmin Gentile is a 64 y o  female who presents with new onset congestive heart failure  Patient with history of insulin-dependent DM2, neurogenic bladder with suprapubic urinary catheter  Patient states she has had this catheter for number of months, with the current 1 being in place for about 1 month  She denies any pain or irritation to the tube site, other than her current yeast infection in her intertriginous folds  Currently denies any fevers, chills, chest pain, shortness of breath  No other associated symptoms      Review of Systems:  History obtained from the patient  General ROS: negative for - chills or fever  Respiratory ROS: negative for - cough, shortness of breath or wheezing  Cardiovascular ROS: negative for - chest pain or palpitations  Gastrointestinal ROS: negative for - abdominal pain, blood in stools, change in bowel habits, melena or nausea/vomiting  Genito-Urinary ROS: no dysuria, trouble voiding, or hematuria   All other ROS negative    Historical Information   Past Medical History:   Diagnosis Date    Abnormal liver function     Anemia     Anxiety     Arthritis     Chronic kidney disease     stage 3    Chronic narcotic dependence (HCC)     Chronic pain disorder     lower back, hands , neck and knees    Coronary artery disease     Depression     Diabetes mellitus ([address] )     GERD (gastroesophageal reflux disease)     no meds at present    Heart murmur     murmur    Hyperlipidemia     Hypertension     Neurogenic bladder     Open toe wound 2020    right big toe open calus but is dry at present    Renal disorder     Shortness of breath     exertion    Sleep apnea     doesn't use cpap    Suprapubic catheter [facility])      Past Surgical History:   Procedure Laterality Date    AMPUTATION      ANGIOPLASTY      5    BREAST EXCISIONAL BIOPSY Left     BREAST SURGERY      CARDIAC CATHETERIZATION      CARPAL TUNNEL RELEASE Bilateral     CERVICAL FUSION      HYSTERECTOMY  2008    IR SUPRAPUBIC CATHETER PLACEMENT  6/15/2021    KNEE SURGERY      OOPHORECTOMY  2008    CT EXC SKIN BENIG 3 1-4 CM REMAINDR BODY N/A 2020    Procedure: EXCISION SEBACEOUS CYST X 5 SCALP;  Surgeon: Latosha Weldon MD;  Location:  MAIN OR;  Service: General    TOE AMPUTATION Left     TRIGGER FINGER RELEASE Right     4th Finger     Social History   Social History     Substance and Sexual Activity   Alcohol Use Not Currently     Social History     Substance and Sexual Activity   Drug Use Never     Social History     Tobacco Use   Smoking Status Former Smoker    Packs/day: 1 00    Years: 35 00    Pack years: 35 00    Types: Cigarettes    Quit date:     Years since quittin 6   Smokeless Tobacco Never Used     Family History   Problem Relation Age of Onset    Stroke Father     Heart disease Father     No Known Problems Mother     No Known Problems Sister     No Known Problems Daughter     No Known Problems Maternal Grandmother     No Known Problems Maternal Grandfather     No Known Problems Paternal Grandmother     No Known Problems Paternal Grandfather     No Known Problems Maternal Aunt     No Known Problems Maternal Aunt     No Known Problems Maternal Aunt     No Known Problems Maternal Aunt     No Known Problems Maternal Aunt     No Known Problems Maternal Aunt     No Known Problems Paternal Aunt        Medications:  Current Facility-Administered Medications   Medication Dose Route Frequency    acetaminophen (TYLENOL) tablet 650 mg  650 mg Oral Q6H PRN    amLODIPine (NORVASC) tablet 5 mg  5 mg Oral Daily    aspirin (ECOTRIN LOW STRENGTH) EC tablet 81 mg  81 mg Oral Daily    atorvastatin (LIPITOR) tablet 40 mg  40 mg Oral After Dinner    carvedilol (COREG) tablet 25 mg  25 mg Oral BID With Meals    cefTRIAXone (ROCEPHIN) 1,000 mg in dextrose 5 % 50 mL IVPB  1,000 mg Intravenous Q24H    cholecalciferol (VITAMIN D3) tablet 2,000 Units  2,000 Units Oral Daily    clopidogrel (PLAVIX) tablet 75 mg  75 mg Oral Daily    dicyclomine (BENTYL) capsule 10 mg  10 mg Oral 4x Daily (AC & HS)    docusate sodium (COLACE) capsule 100 mg  100 mg Oral BID    ferrous sulfate tablet 325 mg  325 mg Oral Every Other Day    heparin (porcine) subcutaneous injection 5,000 Units  5,000 Units Subcutaneous Q8H [address]    HYDROcodone-acetaminophen (NORCO) 5-325 mg per tablet 1 tablet  1 tablet Oral BID PRN    insulin glargine (LANTUS) subcutaneous injection 50 Units 0 5 mL  50 Units Subcutaneous Q12H LIAM    insulin lispro (HumaLOG) 100 units/mL subcutaneous injection 2-12 Units  2-12 Units Subcutaneous TID AC    isosorbide mononitrate (IMDUR) 24 hr tablet 30 mg  30 mg Oral Daily    levothyroxine tablet 50 mcg  50 mcg Oral Early Morning    nitroglycerin (NITROSTAT) SL tablet 0 4 mg  0 4 mg Sublingual Q5 Min PRN    nystatin (MYCOSTATIN) powder   Topical BID    ondansetron (ZOFRAN-ODT) dispersible tablet 4 mg  4 mg Oral Q6H PRN    promethazine (PHENERGAN) injection 25 mg  25 mg Intravenous Q12H PRN    saccharomyces boulardii (FLORASTOR) capsule 250 mg  250 mg Oral BID    [START ON 8/31/2021] sodium chloride 0 9 % infusion  50 mL/hr Intravenous Continuous       Allergies   Allergen Reactions    Codeine      Other reaction(s): Nausea and Vomiting    Latex Itching       Physical Examination:  Vitals:   Vitals:    08/30/21 0300 08/30/21 0600 08/30/21 0700 08/30/21 1100   BP:   (!) 173/76 128/64   BP Location:   Left arm Left arm   Pulse:   68 61   Resp:   18 18   Temp:   97 8 °F (36 6 °C) 97 6 °F (36 4 °C)   TempSrc:   Temporal Temporal   SpO2:   96% 97%   Weight:  127 kg (279 lb 12 2 oz)     Height: 5' 8" (1 727 m)        Temp  Min: 96 °F (35 6 °C)  Max: 98 4 °F (36 9 °C)  IBW (Ideal Body Weight): 63 9 kg      /64 (BP Location: Left arm)   Pulse 61   Temp 97 6 °F (36 4 °C) (Temporal)   Resp 18   Ht 5' 8" (1 727 m)   Wt 127 kg (279 lb 12 2 oz)   SpO2 97%   BMI 42 54 kg/m²   General appearance: alert and oriented, in no acute distress  Head: Normocephalic, without obvious abnormality, atraumatic  Eyes: Sclerae anicteric  Neck: supple, symmetrical, trachea midline  Lungs: clear to auscultation bilaterally  Heart: regular rate and rhythm, S1, S2 normal, no murmur, click, rub or gallop  Abdomen: soft, non-tender; bowel sounds normal; no masses,  no organomegaly  Extremities: extremities normal, warm and well-perfused; no cyanosis, clubbing, or edema  Skin: Raw intertriginous Candida infection under abdominal pannus, pink healthy suprapubic tube tract, skin otherwise normal  Neurologic: Grossly normal    Diagnostic Data:  Lab:   I have personally reviewed pertinent lab results  , CBC: No results found for: WBC, HGB, HCT, MCV, PLT, ADJUSTEDWBC, MCH, MCHC, RDW, MPV, NRBC, CMP:   Lab Results   Component Value Date    SODIUM 141 08/30/2021    K 3 5 08/30/2021     08/30/2021    CO2 26 08/30/2021    BUN 57 (H) 08/30/2021    CREATININE 2 60 (H) 08/30/2021    CALCIUM 8 2 (L) 08/30/2021    EGFR 20 08/30/2021 Results from last 7 days   Lab Units 08/29/21  0750   WBC Thousand/uL 10 00   HEMOGLOBIN g/dL 8 8*   HEMATOCRIT % 26 1*   PLATELETS Thousands/uL 224     Results from last 7 days   Lab Units 08/30/21  0516 08/28/21  0447   POTASSIUM mmol/L 3 5 4 0   CHLORIDE mmol/L 105 105   CO2 mmol/L 26 28   BUN mg/dL 57* 54*   CREATININE mg/dL 2 60* 2 22*   CALCIUM mg/dL 8 2* 8 9   ALK PHOS U/L  --  101   ALT U/L  --  15   AST U/L  --  28       Imaging: I have personally reviewed the pertinent imaging studies on the PACS system  No results found  Active medications: The patients active medications were reviewed and modified as appropriate  VTE Prophylaxis: Heparin      Code Status: Level 1 - Full Code      Counseling / Coordination of Care  Total floor / unit time spent today 30 minutes  Greater than 50% of total time was spent with the patient and / or family counseling and / or coordination of care         Jayda Adams PA-C  8/30/2021

## 2021-08-30 NOTE — ASSESSMENT & PLAN NOTE
Lab Results   Component Value Date    EGFR 20 08/30/2021    EGFR 21 (L) 08/29/2021    EGFR 24 (L) 08/28/2021    CREATININE 2 60 (H) 08/30/2021    CREATININE 2 44 (H) 08/29/2021    CREATININE 2 22 (H) 08/28/2021     · History of CKD stage 4   · Creatinine slightly worsened today to 2 6, baseline 2 2-2 6   · Nephrology following  · Will need close monitoring after cardiac catheterization  · Gentle IV fluids pre and post catheterization

## 2021-08-30 NOTE — UTILIZATION REVIEW
Initial Clinical Review    Admission: Date/Time/Statement:   Admission Orders (From admission, onward)     Ordered        08/29/21 1452  Inpatient Admission  Once                   Orders Placed This Encounter   Procedures    Inpatient Admission     Standing Status:   Standing     Number of Occurrences:   1     Order Specific Question:   Level of Care     Answer:   Med Surg [16]     Order Specific Question:   Estimated length of stay     Answer:   More than 2 Midnights     Order Specific Question:   Certification     Answer:   I certify that inpatient services are medically necessary for this patient for a duration of greater than two midnights  See H&P and MD Progress Notes for additional information about the patient's course of treatment  Initial Presentation:   Ms Isac Dale is a 65 yo female who presents as a transfer from [address] [place] [address] to [facility] for treatment of new onset CHF  She has c/o weight gain, SOB and was started on IV diuretics  She had an Echo with EF of 45% with grade 2 diastolic dysfunction and regional wall motion abnormalities  She is in need of cardiac cath  She also has a h/o CKD and needs Nephrology consult  She also had a UTI and was on IV antibiotics  She has a chronic suprapubic catheter  PMH: diabetes, HTN, HLD, CAD  She is admitted to INPATIENT status with New onset CHF - Oral Torsemide, cardiac diet with fluid restriction, cardiac cath on 8/30, NPO p MN, hold diuretic in AM, IV fluids pre and post cath  UTI with cystostomy catheter d/t neurogenic bladder - IV antibiotic, Urology consult for possible catheter change  CKD stage 4 - fluids pre and post cath, nephrology consult  HTN - Norvasc, Coreg, Demadex  Normocytic anemia - Iron, fecal occult blood  CAD - Imdur, Plavix, ASA, Coreg  IDDM - SSI cover  8/29 Nephrology Consult - stable volume status, hold Torsemide, IV fluids pre and post cath - CAD stage 4 with baseline creat 2 3-2 6, HTN with stable BP, CM with 45% EF  Date: 8/30   Day 2:   Cardiac cath on hold today d/t increased creat to 2 60  Stop Lasix  Continues on Low dose IV fluids  Monitoring creat  8/30 Cardio Consult - Coronary artery disease, status post PCI with placement of 5 stents in unknown vessels in 2012 in 2017 in [place], Acute combined systolic and diastolic heart failure, Chronic kidney disease, stage IV,  Hypertension - pt had increase in creat despite fluid hydration - holding off on Cardiac cath until 8/31  Will not receive diuretics until after cath  Continue IV NSS at 50 cc/hr starting at MN, NPO p MN, follow electrolytes and renal function  DATE: 8/31 day 3 -   No issues with suprapubic catheter  Creat jump has resolved today currently at 2 13       8/31 cardiac Cath done - CORONARY CIRCULATION: Proximal RCA: There was a 100 % stenosis  This lesion is a chronic total occlusion  INDICATIONS: --  Possible CAD: myocardial infarction without ST elevation (NSTEMI)  --  Congestive heart failure with ischemic cardiomyopathy  PROCEDURES PERFORMED:     --  Left heart catheterization without ventriculogram   --  Left coronary angiography  --  Right coronary angiography  --  Inpatient  --  Mod Sedation Same Physician Initial 15min  --  Coronary Catheterization (w/ LHC)       Pain Score       08/29/21 2000       No Pain          Wt Readings from Last 1 Encounters:   08/30/21 127 kg (279 lb 12 2 oz)     Additional Vital Signs:   08/31/21 1515  97 5 °F (36 4 °C)  65  18  150/67  --  93 %  --  --  None (Room air)  Lying   08/31/21 14:46:44  --  --  --  --  --  97 %  --  --  None (Room air)  --   08/31/21 14:30:46  --  --  --  --  --  --  28  2 L/min   Nasal cannula  --   Nasal Cannula O2 Flow Rate (L/min): applied with sedation at 08/31/21 1430   08/31/21 1100  96 9 °F (36 1 °C)Abnormal   63  18  170/71  102  95 %  --  --  None (Room air)  Lying   08/31/21 0844  96 9 °F (36 1 °C)Abnormal   64  16  174/76Abnormal   --  97 %  --  --  None (Room air)  Lying   08/31/21 0300  97 9 °F (36 6 °C)  65  16  158/80  11  96 %  --  --  None (Room air)  Lying   08/30/21 2230  97 3 °F (36 3 °C)Abnormal   65  18  155/86  106  94 %  --  --  None (Room air)  Lying   08/30/21 1920  96 8 °F (36 °C)Abnormal   66  18  153/70  100  96 %  --  --  None (Room air)  Lying   08/30/21 1515  96 8 °F (36 °C)Abnormal   64  18  164/71  102  95 %  --  --  None (Room air)  Lying     08/30/21 1100  97 6 °F (36 4 °C)  61  18  128/64  77  97 %  None (Room air)  Sitting   08/30/21 0700  97 8 °F (36 6 °C)  68  18  173/76Abnormal   --  96 %  None (Room air)  Lying   08/29/21 2257  97 6 °F (36 4 °C)  69  16  142/62  80  93 %  --  Lying   08/29/21 1933  97 3 °F (36 3 °C)Abnormal   66  16  136/64  78  96 %  None (Room air)  Sitting   08/29/21 1524  97 7 °F (36 5 °C)  67  16  117/58  --  94 %  None (Room air)  Lying     Pertinent Labs/Diagnostic Test Results:         Results from last 7 days   Lab Units 08/29/21  0750 08/28/21  0447 08/27/21  0454 08/26/21  0444 08/25/21  0507   WBC Thousand/uL 10 00 9 60 8 00 8 30 8 20   HEMOGLOBIN g/dL 8 8* 9 2* 8 7* 9 0* 8 7*   HEMATOCRIT % 26 1* 28 3* 26 5* 26 7* 26 0*   PLATELETS Thousands/uL 224 219 210 220 213   NEUTROS ABS Thousands/µL 6 90 6 20 5 30 5 70 5 50         Results from last 7 days   Lab Units 08/31/21  0743 08/30/21  0516 08/29/21  0503 08/28/21  0447 08/27/21  0454 08/26/21  0444   SODIUM mmol/L 141 141 141 142 142 140   POTASSIUM mmol/L 3 8 3 5 3 8 4 0 4 2 4 5   CHLORIDE mmol/L 106 105 104 105 106 106   CO2 mmol/L 27 26 29 28 28 24   ANION GAP mmol/L 8 10 8 9 8 10   BUN mg/dL 53* 57* 57* 54* 54* 56*   CREATININE mg/dL 2 13* 2 60* 2 44* 2 22* 2 13* 2 31*   EGFR ml/min/1 73sq m 25 20 21* 24* 25* 23*   CALCIUM mg/dL 8 4 8 2* 8 6 8 9 9 0 8 9   MAGNESIUM mg/dL  --   --  1 7 1 7 1 7 1 9   PHOSPHORUS mg/dL  --   --  5 8* 5 4* 5 5* 5 6*     Results from last 7 days   Lab Units 08/28/21  0447 08/27/21  0454 08/25/21  0507   AST U/L 28 27 21   ALT U/L 15 16 14   ALK PHOS U/L 101 98 99   TOTAL PROTEIN g/dL 6 4 6 2 6 0   ALBUMIN g/dL 3 6 3 5 3 4   TOTAL BILIRUBIN mg/dL 0 40 0 39 0 39     Results from last 7 days   Lab Units 08/31/21  1114 08/31/21  0726 08/30/21  2053 08/30/21  1631 08/30/21  1103 08/30/21  0720 08/29/21  2115 08/29/21  1605 08/29/21  1056 08/29/21  0606 08/28/21  2056 08/28/21  1608   POC GLUCOSE mg/dl 122 129 236* 170* 132 102 184* 132 128 134 208* 161*     Results from last 7 days   Lab Units 08/31/21  0743 08/30/21  0516 08/29/21  0503 08/28/21  0447 08/27/21  0454 08/26/21  0444 08/25/21  0507   GLUCOSE RANDOM mg/dL 138 132 130* 107* 167* 251* 149*     Past Medical History:   Diagnosis Date    Abnormal liver function     Anemia     Anxiety     Arthritis     Chronic kidney disease     stage 3    Chronic narcotic dependence (HCC)     Chronic pain disorder     lower back, hands , neck and knees    Coronary artery disease     Depression     Diabetes mellitus (HCC)     GERD (gastroesophageal reflux disease)     no meds at present    Heart murmur     murmur    Hyperlipidemia     Hypertension     Neurogenic bladder     Open toe wound 12/2020    right big toe open calus but is dry at present    Renal disorder     Shortness of breath     exertion    Sleep apnea     doesn't use cpap    Suprapubic catheter ([facility] )      Present on Admission:   CAD (coronary artery disease)   Normochromic normocytic anemia   New onset of congestive heart failure (HCC)   Urinary tract infection associated with cystostomy catheter (HCC)   CKD (chronic kidney disease) stage 4, GFR 15-29 ml/min ([facility])   HTN (hypertension)    Admitting Diagnosis: [facility] ([facility] [I50 9]     Age/Sex: 64 y o  female     Admission Orders:    Scheduled Medications:  acetylcysteine, 600 mg, Oral, BID  amLODIPine, 5 mg, Oral, Daily  aspirin, 81 mg, Oral, Daily  atorvastatin, 40 mg, Oral, After Dinner  carvedilol, 25 mg, Oral, BID With Meals  cholecalciferol, 2,000 Units, Oral, Daily  clopidogrel, 75 mg, Oral, Daily  dicyclomine, 10 mg, Oral, 4x Daily (AC & HS)  docusate sodium, 100 mg, Oral, BID  ferrous sulfate, 325 mg, Oral, Every Other Day  heparin (porcine), 5,000 Units, Subcutaneous, Q8H LIAM  insulin glargine, 50 Units, Subcutaneous, Q12H LIAM  insulin lispro, 2-12 Units, Subcutaneous, TID AC  isosorbide mononitrate, 30 mg, Oral, Daily  levothyroxine, 50 mcg, Oral, Early Morning  nystatin, , Topical, BID  saccharomyces boulardii, 250 mg, Oral, BID      Continuous IV Infusions:  sodium chloride, 75 mL/hr, Intravenous, Continuous      PRN Meds:  acetaminophen, 650 mg, Oral, Q6H PRN -x 1 8/29  HYDROcodone-acetaminophen, 1 tablet, Oral, BID PRN -x 1 8/30  nitroglycerin, 0 4 mg, Sublingual, Q5 Min PRN  ondansetron, 4 mg, Oral, Q6H PRN  promethazine, 25 mg, Intravenous, Q12H PRN    SCDs  OOB as korin and ambulate 4 x daily  POC GLUCOSE AC/HS WITH SSI COVERAGE   NPO p MN 8/31 for cardiac cath  IP CONSULT TO UROLOGY  IP CONSULT TO CARDIOLOGY  IP CONSULT TO NEPHROLOGY    Network Utilization Review Department  ATTENTION: Please call with any questions or concerns to [phone] and carefully listen to the prompts so that you are directed to the right person  All voicemails are confidential   Cloyde Havers all requests for admission clinical reviews, approved or denied determinations and any other requests to dedicated fax number below belonging to the campus where the patient is receiving treatment   List of dedicated fax numbers for the Facilities:  [address] DENIALS (Administrative/Medical Necessity) [phone]   [address] [place] [address] (Maternity/NICU/Pediatrics) 261 [facility] [place] [address] [address] [place] [address] [address] [facility] [address] [phone]

## 2021-08-30 NOTE — ASSESSMENT & PLAN NOTE
· Hemoglobin 8 8   · Likely component of CKD stage 4  · Continue iron  · Fecal occult blood negative  · Continue daily monitoring

## 2021-08-30 NOTE — PLAN OF CARE
Problem: Potential for Falls  Goal: Patient will remain free of falls  Description: INTERVENTIONS:  - Educate patient/family on patient safety including physical limitations  - Instruct patient to call for assistance with activity   - Consult OT/PT to assist with strengthening/mobility   - Keep Call bell within reach  - Keep bed low and locked with side rails adjusted as appropriate  - Keep care items and personal belongings within reach  - Initiate and maintain comfort rounds  - Make Fall Risk Sign visible to staff  - Offer Toileting every 2 Hours, in advance of need  - Initiate/Maintain bed alarm  - Apply yellow socks and bracelet for high fall risk patients  - Consider moving patient to room near nurses station  Outcome: Progressing     Problem: CARDIOVASCULAR - ADULT  Goal: Maintains optimal cardiac output and hemodynamic stability  Description: INTERVENTIONS:  - Monitor I/O, vital signs and rhythm  - Monitor for S/S and trends of decreased cardiac output  - Administer and titrate ordered vasoactive medications to optimize hemodynamic stability  - Assess quality of pulses, skin color and temperature  - Assess for signs of decreased coronary artery perfusion  - Instruct patient to report change in severity of symptoms  Outcome: Progressing  Goal: Absence of cardiac dysrhythmias or at baseline rhythm  Description: INTERVENTIONS:  - Continuous cardiac monitoring, vital signs, obtain 12 lead EKG if ordered  - Administer antiarrhythmic and heart rate control medications as ordered  - Monitor electrolytes and administer replacement therapy as ordered  Outcome: Progressing     Problem: RESPIRATORY - ADULT  Goal: Achieves optimal ventilation and oxygenation  Description: INTERVENTIONS:  - Assess for changes in respiratory status  - Assess for changes in mentation and behavior  - Position to facilitate oxygenation and minimize respiratory effort  - Oxygen administered by appropriate delivery if ordered  - Initiate smoking cessation education as indicated  - Encourage broncho-pulmonary hygiene including cough, deep breathe, Incentive Spirometry  - Assess the need for suctioning and aspirate as needed  - Assess and instruct to report SOB or any respiratory difficulty  - Respiratory Therapy support as indicated  Outcome: Progressing     Problem: GASTROINTESTINAL - ADULT  Goal: Minimal or absence of nausea and/or vomiting  Description: INTERVENTIONS:  - Administer IV fluids if ordered to ensure adequate hydration  - Maintain NPO status until nausea and vomiting are resolved  - Nasogastric tube if ordered  - Administer ordered antiemetic medications as needed  - Provide nonpharmacologic comfort measures as appropriate  - Advance diet as tolerated, if ordered  - Consider nutrition services referral to assist patient with adequate nutrition and appropriate food choices  Outcome: Progressing  Goal: Maintains or returns to baseline bowel function  Description: INTERVENTIONS:  - Assess bowel function  - Encourage oral fluids to ensure adequate hydration  - Administer IV fluids if ordered to ensure adequate hydration  - Administer ordered medications as needed  - Encourage mobilization and activity  - Consider nutritional services referral to assist patient with adequate nutrition and appropriate food choices  Outcome: Progressing  Goal: Maintains adequate nutritional intake  Description: INTERVENTIONS:  - Monitor percentage of each meal consumed  - Identify factors contributing to decreased intake, treat as appropriate  - Assist with meals as needed  - Monitor I&O, weight, and lab values if indicated  - Obtain nutrition services referral as needed  Outcome: Progressing  Goal: Establish and maintain optimal ostomy function  Description: INTERVENTIONS:  - Assess bowel function  - Encourage oral fluids to ensure adequate hydration  - Administer IV fluids if ordered to ensure adequate hydration   - Administer ordered medications as needed  - Encourage mobilization and activity  - Nutrition services referral to assist patient with appropriate food choices  - Assess stoma site  - Consider wound care consult   Outcome: Progressing  Goal: Oral mucous membranes remain intact  Description: INTERVENTIONS  - Assess oral mucosa and hygiene practices  - Implement preventative oral hygiene regimen  - Implement oral medicated treatments as ordered  - Initiate Nutrition services referral as needed  Outcome: Progressing     Problem: GENITOURINARY - ADULT  Goal: Maintains or returns to baseline urinary function  Description: INTERVENTIONS:  - Assess urinary function  - Encourage oral fluids to ensure adequate hydration if ordered  - Administer IV fluids as ordered to ensure adequate hydration  - Administer ordered medications as needed  - Offer frequent toileting  - Follow urinary retention protocol if ordered  Outcome: Progressing  Goal: Absence of urinary retention  Description: INTERVENTIONS:  - Assess patients ability to void and empty bladder  - Monitor I/O  - Bladder scan as needed  - Discuss with physician/AP medications to alleviate retention as needed  - Discuss catheterization for long term situations as appropriate  Outcome: Progressing  Goal: Urinary catheter remains patent  Description: INTERVENTIONS:  - Assess patency of urinary catheter  - If patient has a chronic morales, consider changing catheter if non-functioning  - Follow guidelines for intermittent irrigation of non-functioning urinary catheter  Outcome: Progressing     Problem: METABOLIC, FLUID AND ELECTROLYTES - ADULT  Goal: Electrolytes maintained within normal limits  Description: INTERVENTIONS:  - Monitor labs and assess patient for signs and symptoms of electrolyte imbalances  - Administer electrolyte replacement as ordered  - Monitor response to electrolyte replacements, including repeat lab results as appropriate  - Instruct patient on fluid and nutrition as appropriate  Outcome: Progressing  Goal: Fluid balance maintained  Description: INTERVENTIONS:  - Monitor labs   - Monitor I/O and WT  - Instruct patient on fluid and nutrition as appropriate  - Assess for signs & symptoms of volume excess or deficit  Outcome: Progressing  Goal: Glucose maintained within target range  Description: INTERVENTIONS:  - Monitor Blood Glucose as ordered  - Assess for signs and symptoms of hyperglycemia and hypoglycemia  - Administer ordered medications to maintain glucose within target range  - Assess nutritional intake and initiate nutrition service referral as needed  Outcome: Progressing     Problem: HEMATOLOGIC - ADULT  Goal: Maintains hematologic stability  Description: INTERVENTIONS  - Assess for signs and symptoms of bleeding or hemorrhage  - Monitor labs  - Administer supportive blood products/factors as ordered and appropriate  Outcome: Progressing     Problem: MUSCULOSKELETAL - ADULT  Goal: Maintain or return mobility to safest level of function  Description: INTERVENTIONS:  - Assess patient's ability to carry out ADLs; assess patient's baseline for ADL function and identify physical deficits which impact ability to perform ADLs (bathing, care of mouth/teeth, toileting, grooming, dressing, etc )  - Assess/evaluate cause of self-care deficits   - Assess range of motion  - Assess patient's mobility  - Assess patient's need for assistive devices and provide as appropriate  - Encourage maximum independence but intervene and supervise when necessary  - Involve family in performance of ADLs  - Assess for home care needs following discharge   - Consider OT consult to assist with ADL evaluation and planning for discharge  - Provide patient education as appropriate  Outcome: Progressing  Goal: Maintain proper alignment of affected body part  Description: INTERVENTIONS:  - Support, maintain and protect limb and body alignment  - Provide patient/ family with appropriate education  Outcome: Progressing Problem: PAIN - ADULT  Goal: Verbalizes/displays adequate comfort level or baseline comfort level  Description: Interventions:  - Encourage patient to monitor pain and request assistance  - Assess pain using appropriate pain scale  - Administer analgesics based on type and severity of pain and evaluate response  - Implement non-pharmacological measures as appropriate and evaluate response  - Consider cultural and social influences on pain and pain management  - Notify physician/advanced practitioner if interventions unsuccessful or patient reports new pain  Outcome: Progressing     Problem: INFECTION - ADULT  Goal: Absence or prevention of progression during hospitalization  Description: INTERVENTIONS:  - Assess and monitor for signs and symptoms of infection  - Monitor lab/diagnostic results  - Monitor all insertion sites, i e  indwelling lines, tubes, and drains  - Monitor endotracheal if appropriate and nasal secretions for changes in amount and color  - [place] appropriate cooling/warming therapies per order  - Administer medications as ordered  - Instruct and encourage patient and family to use good hand hygiene technique  - Identify and instruct in appropriate isolation precautions for identified infection/condition  Outcome: Progressing  Goal: Absence of fever/infection during neutropenic period  Description: INTERVENTIONS:  - Monitor WBC    Outcome: Progressing     Problem: SAFETY ADULT  Goal: Patient will remain free of falls  Description: INTERVENTIONS:  - Educate patient/family on patient safety including physical limitations  - Instruct patient to call for assistance with activity   - Consult OT/PT to assist with strengthening/mobility   - Keep Call bell within reach  - Keep bed low and locked with side rails adjusted as appropriate  - Keep care items and personal belongings within reach  - Initiate and maintain comfort rounds  - Make Fall Risk Sign visible to staff  - Offer Toileting every 2 Hours, in advance of need  - Initiate/Maintain bed alarm  - Apply yellow socks and bracelet for high fall risk patients  - Consider moving patient to room near nurses station  Outcome: Progressing  Goal: Maintain or return to baseline ADL function  Description: INTERVENTIONS:  -  Assess patient's ability to carry out ADLs; assess patient's baseline for ADL function and identify physical deficits which impact ability to perform ADLs (bathing, care of mouth/teeth, toileting, grooming, dressing, etc )  - Assess/evaluate cause of self-care deficits   - Assess range of motion  - Assess patient's mobility; develop plan if impaired  - Assess patient's need for assistive devices and provide as appropriate  - Encourage maximum independence but intervene and supervise when necessary  - Involve family in performance of ADLs  - Assess for home care needs following discharge   - Consider OT consult to assist with ADL evaluation and planning for discharge  - Provide patient education as appropriate  Outcome: Progressing  Goal: Maintains/Returns to pre admission functional level  Description: INTERVENTIONS:  - Perform BMAT or MOVE assessment daily    - Set and communicate daily mobility goal to care team and patient/family/caregiver  - Collaborate with rehabilitation services on mobility goals if consulted  - Perform Range of Motion 3 times a day  - Reposition patient every 2 hours    - Out of bed for toileting  - Record patient progress and toleration of activity level   Outcome: Progressing     Problem: DISCHARGE PLANNING  Goal: Discharge to home or other facility with appropriate resources  Description: INTERVENTIONS:  - Identify barriers to discharge w/patient and caregiver  - Arrange for needed discharge resources and transportation as appropriate  - Identify discharge learning needs (meds, wound care, etc )  - Arrange for interpretive services to assist at discharge as needed  - Refer to Case Management Department for coordinating discharge planning if the patient needs post-hospital services based on physician/advanced practitioner order or complex needs related to functional status, cognitive ability, or social support system  Outcome: Progressing     Problem: Knowledge Deficit  Goal: Patient/family/caregiver demonstrates understanding of disease process, treatment plan, medications, and discharge instructions  Description: Complete learning assessment and assess knowledge base  Interventions:  - Provide teaching at level of understanding  - Provide teaching via preferred learning methods  Outcome: Progressing     Problem: MOBILITY - ADULT  Goal: Maintain or return to baseline ADL function  Description: INTERVENTIONS:  -  Assess patient's ability to carry out ADLs; assess patient's baseline for ADL function and identify physical deficits which impact ability to perform ADLs (bathing, care of mouth/teeth, toileting, grooming, dressing, etc )  - Assess/evaluate cause of self-care deficits   - Assess range of motion  - Assess patient's mobility; develop plan if impaired  - Assess patient's need for assistive devices and provide as appropriate  - Encourage maximum independence but intervene and supervise when necessary  - Involve family in performance of ADLs  - Assess for home care needs following discharge   - Consider OT consult to assist with ADL evaluation and planning for discharge  - Provide patient education as appropriate  Outcome: Progressing  Goal: Maintains/Returns to pre admission functional level  Description: INTERVENTIONS:  - Perform BMAT or MOVE assessment daily    - Set and communicate daily mobility goal to care team and patient/family/caregiver     - Collaborate with rehabilitation services on mobility goals if consulted  - Out of bed for toileting  - Record patient progress and toleration of activity level   Outcome: Progressing

## 2021-08-30 NOTE — CONSULTS
Consultation - Cardiology   Ankit Hanna 64 y o  female MRN: [id number]  Unit/Bed#: E4 -01 Encounter: [id number]    Physician Requesting Consult: Leeanna Sacks, DO  Reason for Consult / Principal Problem:    Transferred for cardiac catheterizationConsulting physician:  Catrina Waters MD      Assessment/Plan     Assessment:  1  Coronary artery disease, status post PCI with placement of 5 stents in unknown vessels in 2012 in 2017 in [place]  2  Acute combined systolic and diastolic heart failure  3  Chronic kidney disease, stage IV  4  Hypertension  5  Diabetes mellitus, type 2  6  hyperlipidemia  7  Neurogenic bladder with suprapubic catheter    Plan:  1  Patient was scheduled for cardiac catheterization today but had an increase in creatinine since yesterday despite fluid hydration  Cardiac catheterization postponed until tomorrow  2  Patient not to receive diuretics until after her cardiac catheterization  3  Discontinue present IV fluid and restart normal saline at 50 cc an hour at midnight  4  Discontinue NPO status until midnight and resume diet presently  5  Continue to monitor electrolytes and renal function     History of Present Illness   HPI: Ankit Hanna is a 64y o  year old female who presents to [facility] in congestive heart failure, combined systolic and diastolic  She has a history of coronary artery disease with PCI in 2012 in 2017 in [place]  Patient states that she has had a total of 5 stents placed  We do not know what vessels  She also has chronic kidney disease which has limited aggressive diuresis  At the present time she appears euvolemic and was transferred for cardiac catheterization    However the creatinine increased overnight despite fluid hydration which began at midnight    Patient Active Problem List    Diagnosis Date Noted    New onset of congestive heart failure ([facility] [address] ) 08/24/2021    Hypocalcemia 08/24/2021    Prolonged QT interval 08/24/2021    Urinary tract infection associated with cystostomy catheter ([place] [address] ) 08/23/2021    Neurogenic bladder 08/23/2021    ARF (acute renal failure) ([place] [address] ) 06/23/2021    Morbid obesity with BMI of 45 0-49 9, adult ([place] [address] ) 06/15/2021    History of heart artery stent 06/15/2021    CKD (chronic kidney disease) stage 4, GFR 15-29 ml/min ([facility]) 06/04/2021    Memory impairment 05/26/2021    Chronic bronchitis ([place] [address] ) 05/25/2021    Severe episode of recurrent major depressive disorder, without psychotic features ([place] [address] ) 05/25/2021    Skin ulcer of hand, limited to breakdown of skin ([place] [address] ) 02/25/2021    HTN (hypertension) 12/21/2020    Diabetic ulcer of toe of right foot associated with type 2 diabetes mellitus, with muscle involvement without evidence of necrosis ([place] [address] ) 11/25/2020    Head lump 11/11/2020    Need for follow-up by  11/11/2020    Normochromic normocytic anemia 09/09/2020    CKD (chronic kidney disease) stage 3, GFR 30-59 ml/min ([facility]) 09/09/2020    Abnormal LFTs 09/09/2020    S/P cervical spinal fusion 09/09/2020    Major depressive disorder 09/02/2020    Type 2 diabetes mellitus with diabetic polyneuropathy, with long-term current use of insulin ([place] [address] ) 09/02/2020    Anxiety 09/02/2020    CAD (coronary artery disease) 09/02/2020    Osteoarthritis of left knee 09/02/2020    Healthcare maintenance 09/02/2020    Cellulitis of finger of right hand 08/26/2020       Vitals: BP (!) 173/76 (BP Location: Left arm)   Pulse 68   Temp 97 8 °F (36 6 °C) (Temporal)   Resp 18   Ht 5' 8" (1 727 m)   Wt 127 kg (279 lb 12 2 oz)   SpO2 96%   BMI 42 54 kg/m²   PREVIOUS WEIGHTS:   Wt Readings from Last 5 Encounters:   08/30/21 127 kg (279 lb 12 2 oz)   08/27/21 133 kg (292 lb 15 9 oz)   08/03/21 132 kg (290 lb)   08/03/21 132 kg (291 lb)   07/13/21 130 kg (286 lb 3 2 oz)        Labs/Data:        Results from last 7 days   Lab Units 08/29/21  0750 08/28/21  0447 08/27/21  0454   WBC Thousand/uL 10 00 9 60 8 00 HEMOGLOBIN g/dL 8 8* 9 2* 8 7*   HEMATOCRIT % 26 1* 28 3* 26 5*   MCV fL 95 98 96   MCH pg 32 0 31 7 31 5   MCHC g/dL 33 7 32 4 33 0   PLATELETS Thousands/uL 224 219 210     Results from last 7 days   Lab Units 08/30/21  0516 08/29/21  0503 08/28/21  0447   EGFR ml/min/1 73sq m 20 21* 24*   SODIUM mmol/L 141 141 142   POTASSIUM mmol/L 3 5 3 8 4 0   CHLORIDE mmol/L 105 104 105   CO2 mmol/L 26 29 28   BUN mg/dL 57* 57* 54*   CREATININE mg/dL 2 60* 2 44* 2 22*     Results from last 7 days   Lab Units 08/30/21  0516 08/29/21  0503 08/28/21  0447 08/27/21  0454 08/25/21  0507   CALCIUM mg/dL 8 2* 8 6 8 9 9 0 8 9   AST U/L  --   --  28 27 21   ALT U/L  --   --  15 16 14   ALK PHOS U/L  --   --  101 98 99   MAGNESIUM mg/dL  --  1 7 1 7 1 7  --      Results from last 7 days   Lab Units 08/24/21  0430 08/24/21  0042 08/23/21 2046   TROPONIN I ng/mL <0 01 <0 01 <0 01     Lab Results   Component Value Date    NTBNP 985 (H) 08/23/2021     Lab Results   Component Value Date    CHOLESTEROL 152 08/03/2021    TRIG 180 (H) 08/03/2021    HDL 30 (L) 08/03/2021    LDLCALC 86 08/03/2021    HGBA1C 7 1 (H) 08/03/2021       Results from last 7 days   Lab Units 08/23/21  2046   INR  1 03   PROTIME seconds 13 6          Current Facility-Administered Medications:     acetaminophen (TYLENOL) tablet 650 mg, 650 mg, Oral, Q6H PRN, Pearl Yoder PA-C, 650 mg at 08/29/21 2113    amLODIPine (NORVASC) tablet 5 mg, 5 mg, Oral, Daily, Asher De Anda PA-C, 5 mg at 08/30/21 5904    aspirin (ECOTRIN LOW STRENGTH) EC tablet 81 mg, 81 mg, Oral, Daily, Asher De Anda PA-C, 81 mg at 08/30/21 5766    atorvastatin (LIPITOR) tablet 40 mg, 40 mg, Oral, After Dinner, Asher De Anda PA-C, 40 mg at 08/29/21 1703    carvedilol (COREG) tablet 25 mg, 25 mg, Oral, BID With Meals, Asher De Anda PA-C, 25 mg at 08/30/21 1910    cefTRIAXone (ROCEPHIN) 1,000 mg in dextrose 5 % 50 mL IVPB, 1,000 mg, Intravenous, Q24H, Asher De Anda PA-C, Last Rate: 100 mL/hr at 08/29/21 2228, 1,000 mg at 08/29/21 2228    cholecalciferol (VITAMIN D3) tablet 2,000 Units, 2,000 Units, Oral, Daily, LARNE Ag-C, 2,000 Units at 08/30/21 0433    clopidogrel (PLAVIX) tablet 75 mg, 75 mg, Oral, Daily, Sienna Lei PA-C, 75 mg at 08/30/21 7358    dicyclomine (BENTYL) capsule 10 mg, 10 mg, Oral, 4x Daily (AC & HS), Sienna Lei PA-C, 10 mg at 08/30/21 0636    docusate sodium (COLACE) capsule 100 mg, 100 mg, Oral, BID, Sienna Lei PA-C, 100 mg at 08/30/21 6594    ferrous sulfate tablet 325 mg, 325 mg, Oral, Every Other Day, Sienna Lei PA-C    heparin (porcine) subcutaneous injection 5,000 Units, 5,000 Units, Subcutaneous, Q8H [address], Sienna Lei PA-C, 5,000 Units at 08/30/21 0636    HYDROcodone-acetaminophen (NORCO) 5-325 mg per tablet 1 tablet, 1 tablet, Oral, BID PRN, LANRE Ag-C, 1 tablet at 08/30/21 0086    insulin glargine (LANTUS) subcutaneous injection 50 Units 0 5 mL, 50 Units, Subcutaneous, Q12H [address], Sienna Lei PA-C, 25 Units at 08/30/21 0820    insulin lispro (HumaLOG) 100 units/mL subcutaneous injection 2-12 Units, 2-12 Units, Subcutaneous, TID AC **AND** Fingerstick Glucose (POCT), , , 4x Daily AC and at bedtime, Sienna Lei PA-C    isosorbide mononitrate (IMDUR) 24 hr tablet 30 mg, 30 mg, Oral, Daily, Sienna Lei PA-C, 30 mg at 08/30/21 5511    levothyroxine tablet 50 mcg, 50 mcg, Oral, Early Morning, LANRE Ag-C, 50 mcg at 08/30/21 0636    nitroglycerin (NITROSTAT) SL tablet 0 4 mg, 0 4 mg, Sublingual, Q5 Min PRN, Sienna Lei PA-C    nystatin (MYCOSTATIN) powder, , Topical, BID, Sienna Lei PA-C, Given at 08/30/21 0939    ondansetron (ZOFRAN-ODT) dispersible tablet 4 mg, 4 mg, Oral, Q6H PRN, Sienna Lei PA-C    promethazine (PHENERGAN) injection 25 mg, 25 mg, Intravenous, Q12H PRN, Sienna Lei PA-C    saccharomyces boulardii (FLORASTOR) capsule 250 mg, 250 mg, Oral, BID, Reji Ca PA-C, 250 mg at 08/30/21 8131    [START ON 8/31/2021] sodium chloride 0 9 % infusion, 50 mL/hr, Intravenous, Continuous, Rachel Dakin, MD  Allergies   Allergen Reactions    Codeine      Other reaction(s): Nausea and Vomiting    Latex Itching       Historical Information   Past Medical History:   Diagnosis Date    Abnormal liver function     Anemia     Anxiety     Arthritis     Chronic kidney disease     stage 3    Chronic narcotic dependence (HCC)     Chronic pain disorder     lower back, hands , neck and knees    Coronary artery disease     Depression     Diabetes mellitus ([address] )     GERD (gastroesophageal reflux disease)     no meds at present    Heart murmur     murmur    Hyperlipidemia     Hypertension     Neurogenic bladder     Open toe wound 12/2020    right big toe open calus but is dry at present    Renal disorder     Shortness of breath     exertion    Sleep apnea     doesn't use cpap    Suprapubic catheter [facility])      Past Surgical History:   Procedure Laterality Date    AMPUTATION      ANGIOPLASTY  2017 5    BREAST EXCISIONAL BIOPSY Left     BREAST SURGERY      CARDIAC CATHETERIZATION      CARPAL TUNNEL RELEASE Bilateral     CERVICAL FUSION      HYSTERECTOMY  2008    IR SUPRAPUBIC CATHETER PLACEMENT  6/15/2021    KNEE SURGERY      OOPHORECTOMY  2008    MT EXC SKIN BENIG 3 1-4 CM REMAINDR BODY N/A 12/21/2020    Procedure: EXCISION SEBACEOUS CYST X 5 SCALP;  Surgeon: Latosha Weldon MD;  Location: [address] [place] [address];  Service: General    TOE AMPUTATION Left     TRIGGER FINGER RELEASE Right     4th Finger     Past Surgical History:   Procedure Laterality Date    AMPUTATION      ANGIOPLASTY  2017 5    BREAST EXCISIONAL BIOPSY Left     BREAST SURGERY      CARDIAC CATHETERIZATION      CARPAL TUNNEL RELEASE Bilateral     CERVICAL FUSION      HYSTERECTOMY  2008    IR SUPRAPUBIC CATHETER PLACEMENT  6/15/2021    KNEE SURGERY      OOPHORECTOMY  2008    WI EXC SKIN BENIG 3 1-4 CM REMAINDR BODY N/A 2020    Procedure: EXCISION SEBACEOUS CYST X 5 SCALP;  Surgeon: Ben Rivera MD;  Location:  MAIN OR;  Service: General    TOE AMPUTATION Left     TRIGGER FINGER RELEASE Right     4th Finger     Social History:  Social History     Substance and Sexual Activity   Alcohol Use Not Currently     Social History     Substance and Sexual Activity   Drug Use Never     Social History     Tobacco Use   Smoking Status Former Smoker    Packs/day: 1 00    Years: 35 00    Pack years: 35 00    Types: Cigarettes    Quit date:     Years since quittin 6   Smokeless Tobacco Never Used     Social History     Socioeconomic History    Marital status:      Spouse name: Not on file    Number of children: Not on file    Years of education: Not on file    Highest education level: Not on file   Occupational History    Not on file   Tobacco Use    Smoking status: Former Smoker     Packs/day: 1 00     Years: 35 00     Pack years: 35 00     Types: Cigarettes     Quit date:      Years since quittin 6    Smokeless tobacco: Never Used   Vaping Use    Vaping Use: Never used   Substance and Sexual Activity    Alcohol use: Not Currently    Drug use: Never    Sexual activity: Not Currently   Other Topics Concern    Not on file   Social History Narrative    Not on file     Social Determinants of Health     Financial Resource Strain:     Difficulty of Paying Living Expenses:    Food Insecurity:     Worried About Running Out of Food in the Last Year:     [address] in the Last Year:    Transportation Needs:     Lack of Transportation (Medical):      Lack of Transportation (Non-Medical):    Physical Activity:     Days of Exercise per Week:     Minutes of Exercise per Session:    Stress:     Feeling of Stress :    Social Connections:     Frequency of Communication with Friends and Family:     Frequency of Social Gatherings with Friends and Family:     Attends [employer] Services:     Active Member of Clubs or Organizations:     Attends Club or Organization Meetings:     Marital Status:    Intimate Partner Violence:     Fear of Current or Ex-Partner:     Emotionally Abused:     Physically Abused:     Sexually Abused:       Family History:   family history includes Heart disease in her father; No Known Problems in her daughter, maternal aunt, maternal aunt, maternal aunt, maternal aunt, maternal aunt, maternal aunt, maternal grandfather, maternal grandmother, mother, paternal aunt, paternal grandfather, paternal grandmother, and sister; Stroke in her father  Intake/Output Summary (Last 24 hours) at 8/30/2021 1005  Last data filed at 8/30/2021 0700  Gross per 24 hour   Intake 810 ml   Output 1575 ml   Net -765 ml       Invasive Devices     Peripheral Intravenous Line            Peripheral IV 08/29/21 Dorsal (posterior); Right Forearm <1 day          Drain            Suprapubic Catheter Non-latex 16 Fr  26 days                Review of Systems   Constitutional: Negative  HENT: Negative  Respiratory: Negative for chest tightness and shortness of breath  Cardiovascular: Negative for chest pain and leg swelling  Gastrointestinal: Negative  Endocrine: Negative  Genitourinary: Negative  Musculoskeletal: Negative  Skin: Negative  Allergic/Immunologic: Negative  Neurological: Negative  Hematological: Negative  Psychiatric/Behavioral: Negative  Physical Exam  Constitutional:       Appearance: She is well-developed  Comments: Morbidly obese, BMI 42 5   HENT:      Head: Normocephalic and atraumatic  Neck:      Thyroid: No thyromegaly  Vascular: No carotid bruit or JVD  Trachea: No tracheal deviation  Cardiovascular:      Rate and Rhythm: Normal rate and regular rhythm  Heart sounds: Normal heart sounds  No murmur heard  No friction rub  No gallop      Pulmonary: Effort: Pulmonary effort is normal  No respiratory distress  Breath sounds: Normal breath sounds  No wheezing, rhonchi or rales  Abdominal:      General: Bowel sounds are normal       Palpations: Abdomen is soft  Musculoskeletal:         General: Normal range of motion  Cervical back: Normal range of motion and neck supple  Right lower leg: No edema  Left lower leg: No edema  Skin:     General: Skin is warm and dry  Neurological:      Mental Status: She is alert and oriented to person, place, and time  Psychiatric:         Mood and Affect: Mood normal          Behavior: Behavior normal          Thought Content: Thought content normal          Judgment: Judgment normal            --------------------------------------------------------------------------------  ECHO:   Results for orders placed during the hospital encounter of 21    Echo complete with contrast if indicated    [place] [address] [address]    Transthoracic Echocardiogram  2D, M-mode, Doppler, and Color Doppler    Study date:  24-Aug-2021    Patient: Tosha Evans  MR number: [id number]  Account number: [de-identified]  : 1960  Age: 64 years  Gender: Female  Status: Inpatient  Location: [facility]  Height: 68 in  Weight: 312 4 lb  BP: 149/ 79 mmHg    Indications: Heart failure    Diagnoses: I50 9 - Heart failure, unspecified    Sonographer:  Doak Simmonds, RCS  Interpreting Physician:  NICOLE Rodriguez  Primary Physician:  Son Camejo MD  Referring Physician:  CHERELLE Porter  Group:  [facility]    SUMMARY    LEFT VENTRICLE:  Systolic function was mildly reduced by visual assessment  Ejection fraction was estimated to be 45 %  There were regional wall motion abnormalities that suggest coronary artery disease    There was severe hypokinesis of the basal-mid anteroseptal and basal-mid inferoseptal wall(s)  There was moderate hypokinesis of the basal-mid inferior wall(s)  Wall thickness was mildly increased  There was mild concentric hypertrophy  Features were consistent with grade 2 diastolic dysfunction  Doppler parameters were consistent with high ventricular filling pressure  E/E' was > 19    VENTRICULAR SEPTUM:  There was dyssynergic motion  These changes are consistent with LBBB  MITRAL VALVE:  There was mild "progressive" mitral stenosis  Mean gradient of 5 mmHg at 73 bpm  MVA by Doppler continuity equation is 2 15 cm2  TRICUSPID VALVE:  There was mild regurgitation  PULMONIC VALVE:  There was trace regurgitation  PERICARDIUM:  A trace pericardial effusion was identified  HISTORY: PRIOR HISTORY: CAD, CKD Risk factors: hypertension and diabetes  PROCEDURE: The study was performed in the [place] [address]  This was a routine study  The transthoracic approach was used  The study included complete 2D imaging, M-mode, complete spectral Doppler, and color Doppler  The heart rate was 76  bpm, at the start of the study  Images were obtained from the parasternal, apical, subcostal, and suprasternal notch acoustic windows  Image quality was adequate  LEFT VENTRICLE: Size was normal  Systolic function was mildly reduced by visual assessment  Ejection fraction was estimated to be 45 %  There were regional wall motion abnormalities that suggest coronary artery disease  There was severe hypokinesis of the basal-mid anteroseptal and basal-mid inferoseptal wall(s)  There was moderate hypokinesis of the basal-mid inferior wall(s)  Wall thickness was mildly increased  There was mild concentric hypertrophy  DOPPLER: Features were consistent with grade 2 diastolic dysfunction  Doppler parameters were consistent with high ventricular filling pressure  E/E' was > 19    VENTRICULAR SEPTUM: There was dyssynergic motion  These changes are consistent with LBBB      RIGHT VENTRICLE: The size was normal  Systolic function was normal  Wall thickness was normal     LEFT ATRIUM: Size was within the limits of normal when indexed for body surface area  LA volume index 31 ml/m2  RIGHT ATRIUM: Size was normal     MITRAL VALVE: Valve structure was normal  There was mild thickening  There was normal leaflet separation  There was mildly restricted mobility of the anterior leaflet  DOPPLER: The transmitral velocity was within the normal range  There  was mild "progressive" mitral stenosis  Mean gradient of 5 mmHg at 73 bpm  MVA by Doppler continuity equation is 2 15 cm2  There was no regurgitation  AORTIC VALVE: The valve was trileaflet  Leaflets exhibited normal thickness and normal cuspal separation  DOPPLER: Transaortic velocity was within the normal range  There was no evidence for stenosis  There was no regurgitation  TRICUSPID VALVE: The valve structure was normal  There was normal leaflet separation  DOPPLER: The transtricuspid velocity was within the normal range  There was no evidence for stenosis  There was mild regurgitation  Estimated peak PA  pressure was 34 mmHg  PULMONIC VALVE: Not well visualized  DOPPLER: There was no evidence for stenosis  There was trace regurgitation  PERICARDIUM: A trace pericardial effusion was identified  The pericardium was normal in appearance  AORTA: The root exhibited normal size  SYSTEMIC VEINS: IVC: The inferior vena cava was normal in size and course   Respirophasic changes were normal     SYSTEM MEASUREMENT TABLES    2D  %FS: 25 15 %  Ao Diam: 2 77 cm  EDV(Teich): 136 76 ml  EF(Teich): 49 26 %  ESV(Teich): 69 39 ml  IVSd: 1 07 cm  LA Area: 30 26 cm2  LA Diam: 4 91 cm  LVEDV MOD A4C: 210 21 ml  LVEF MOD A4C: 46 53 %  LVESV MOD A4C: 112 41 ml  LVIDd: 5 32 cm  LVIDs: 3 99 cm  LVLd A4C: 9 67 cm  LVLs A4C: 8 21 cm  LVPWd: 1 1 cm  RA Area: 18 38 cm2  RVIDd: 3 28 cm  SV MOD A4C: 97 8 ml  SV(Teich): 67 38 ml    CW  TR Vmax: 2 78 m/s  TR maxP 99 mmHg    MM  TAPSE: 2 42 cm    PW  E' Sept: 0 06 m/s  E/E' Sept: 19 84  MV A [place]: 1 49 m/s  MV Dec [place]: 9 08 m/s2  MV DecT: 137 82 ms  MV E [place]: 1 25 m/s  MV E/A Ratio: 0 84  MV PHT: 39 97 ms  MVA By PHT: 5 5 cm2    [facility] Accredited [facility]    Prepared and electronically signed by    NICOLE Scott Ace  Signed 24-Aug-2021 14:54:43      ======================================================     Imaging:   I have personally reviewed pertinent reports  EKG:  Normal sinus rhythm, interventricular conduction delay    Portions of the record may have been created with voice recognition software  Occasional wrong word or "sound a like" substitutions may have occurred due to the inherent limitations of voice recognition software  Read the chart carefully and recognize, using context, where substitutions have occurred      Harmeet Garcia MD

## 2021-08-30 NOTE — ASSESSMENT & PLAN NOTE
· History of CAD with multiple stents placed in 2012 and 2017  · Continue Imdur, Plavix, aspirin and Coreg  · Continue daily statin  · Plan now for cardiac catheterization tomorrow

## 2021-08-30 NOTE — ASSESSMENT & PLAN NOTE
Wt Readings from Last 3 Encounters:   08/30/21 127 kg (279 lb 12 2 oz)   08/27/21 133 kg (292 lb 15 9 oz)   08/03/21 132 kg (290 lb)     · Patient presented to the ED with shortness of breath and weight gain   · Initially required IV Lasix, switch to p o  torsemide 20 mg b i d   · Continue cardiac diet with fluid and salt restriction  · Plan was for cardiac catheterization today but given rising creatinine will plan for cardiac catheterization tomorrow  · Continue holding diuretics  · Gentle IV fluids pre and post catheterization  · Echocardiogram showing EF of 45%, mild left ventricular hypertrophy, grade 2 diastolic dysfunction  · Cardiology following

## 2021-08-30 NOTE — PROGRESS NOTES
[address] [facility]  Progress Note - Ninfa Barrios 1960, 64 y o  female MRN: [id number]  Unit/Bed#: E4 -01 Encounter: [id number]  Primary Care Provider: CHERELLE Villarreal   Date and time admitted to hospital: 8/29/2021  2:49 PM    * New onset of congestive heart failure (HCC)  Assessment & Plan  Wt Readings from Last 3 Encounters:   08/30/21 127 kg (279 lb 12 2 oz)   08/27/21 133 kg (292 lb 15 9 oz)   08/03/21 132 kg (290 lb)     · Patient presented to the ED with shortness of breath and weight gain   · Initially required IV Lasix, switch to p o  torsemide 20 mg b i d   · Continue cardiac diet with fluid and salt restriction  · Plan was for cardiac catheterization today but given rising creatinine will plan for cardiac catheterization tomorrow  · Continue holding diuretics  · Gentle IV fluids pre and post catheterization  · Echocardiogram showing EF of 45%, mild left ventricular hypertrophy, grade 2 diastolic dysfunction  · Cardiology following    Urinary tract infection associated with cystostomy catheter ([facility] [address] )  Assessment & Plan  · History of suprapubic catheter with neurogenic bladder   · Was found to have positive UA at time of admission  · Received IV ceftriaxone  · Urology consult for possible catheter exchange    CKD (chronic kidney disease) stage 4, GFR 15-29 ml/min ([facility])  Assessment & Plan  Lab Results   Component Value Date    EGFR 20 08/30/2021    EGFR 21 (L) 08/29/2021    EGFR 24 (L) 08/28/2021    CREATININE 2 60 (H) 08/30/2021    CREATININE 2 44 (H) 08/29/2021    CREATININE 2 22 (H) 08/28/2021     · History of CKD stage 4   · Creatinine slightly worsened today to 2 6, baseline 2 2-2 6   · Nephrology following  · Will need close monitoring after cardiac catheterization  · Gentle IV fluids pre and post catheterization    HTN (hypertension)  Assessment & Plan  · Blood pressure controlled today  · Continue Norvasc, Coreg and Demadex    Normochromic normocytic anemia  Assessment & Plan  · Hemoglobin 8 8   · Likely component of CKD stage 4  · Continue iron  · Fecal occult blood negative  · Continue daily monitoring    CAD (coronary artery disease)  Assessment & Plan  · History of CAD with multiple stents placed in 2012 and 2017  · Continue Imdur, Plavix, aspirin and Coreg  · Continue daily statin  · Plan now for cardiac catheterization tomorrow    Type 2 diabetes mellitus with diabetic polyneuropathy, with long-term current use of insulin [facility])  Assessment & Plan  Lab Results   Component Value Date    HGBA1C 7 1 (H) 08/03/2021       Recent Labs     08/29/21  1605 08/29/21  2115 08/30/21  0720 08/30/21  1103   POCGLU 132 184* 102 132       Blood Sugar Average: Last 72 hrs:  · (P) 539 [id number] home regimen Lantus 50 units b i d , Humalog 10 units t i d  with meals and Trulicity once weekly   · Continue Lantus 50 units b i d  while inpatient  · Sliding-scale insulin coverage with Accu-Cheks  · Diabetic diet        VTE Pharmacologic Prophylaxis:   High Risk (Score >/= 5) - Pharmacological DVT Prophylaxis Ordered: heparin  Sequential Compression Devices Ordered  Patient Centered Rounds: I performed bedside rounds with nursing staff today  Discussions with Specialists or Other Care Team Provider:  None    Education and Discussions with Family / Patient: Patient declined call to   Time Spent for Care: 30 minutes  More than 50% of total time spent on counseling and coordination of care as described above  Current Length of Stay: 1 day(s)  Current Patient Status: Inpatient   Certification Statement: The patient will continue to require additional inpatient hospital stay due to New onset CHF requiring cardiac catheterization  Discharge Plan: Anticipate discharge in 48-72 hrs to home  Code Status: Level 1 - Full Code    Subjective:   Patient reports she is doing well today  Denies any complaints    Denies any chest pain or shortness of breath  Objective:     Vitals:   Temp (24hrs), Av 6 °F (36 4 °C), Min:97 3 °F (36 3 °C), Max:97 8 °F (36 6 °C)    Temp:  [97 3 °F (36 3 °C)-97 8 °F (36 6 °C)] 97 6 °F (36 4 °C)  HR:  [61-69] 61  Resp:  [16-18] 18  BP: (117-173)/(58-76) 128/64  SpO2:  [93 %-97 %] 97 %  Body mass index is 42 54 kg/m²  Input and Output Summary (last 24 hours): Intake/Output Summary (Last 24 hours) at 2021 1127  Last data filed at 2021 0700  Gross per 24 hour   Intake 810 ml   Output 1575 ml   Net -765 ml       Physical Exam:   Physical Exam  Vitals reviewed  Constitutional:       General: She is not in acute distress  HENT:      Head: Normocephalic and atraumatic  Eyes:      General: No scleral icterus  Conjunctiva/sclera: Conjunctivae normal    Cardiovascular:      Rate and Rhythm: Normal rate and regular rhythm  Heart sounds: No murmur heard  Pulmonary:      Effort: Pulmonary effort is normal  No respiratory distress  Breath sounds: Normal breath sounds  Abdominal:      General: Bowel sounds are normal  There is no distension  Palpations: Abdomen is soft  Tenderness: There is no abdominal tenderness  Musculoskeletal:      Cervical back: Neck supple  Right lower leg: Edema (trace) present  Left lower leg: Edema (trace) present  Skin:     General: Skin is warm and dry  Neurological:      Mental Status: She is alert and oriented to person, place, and time     Psychiatric:         Mood and Affect: Mood normal          Behavior: Behavior normal           Additional Data:     Labs:  Results from last 7 days   Lab Units 21  0750   WBC Thousand/uL 10 00   HEMOGLOBIN g/dL 8 8*   HEMATOCRIT % 26 1*   PLATELETS Thousands/uL 224   NEUTROS PCT % 69*   LYMPHS PCT % 19*   MONOS PCT % 8   EOS PCT % 3     Results from last 7 days   Lab Units 21  0516 21  0447   SODIUM mmol/L 141 142   POTASSIUM mmol/L 3 5 4 0   CHLORIDE mmol/L 105 105   CO2 mmol/L 26 28   BUN mg/dL 57* 54*   CREATININE mg/dL 2 60* 2 22*   ANION GAP mmol/L 10 9   CALCIUM mg/dL 8 2* 8 9   ALBUMIN g/dL  --  3 6   TOTAL BILIRUBIN mg/dL  --  0 40   ALK PHOS U/L  --  101   ALT U/L  --  15   AST U/L  --  28   GLUCOSE RANDOM mg/dL 132 107*     Results from last 7 days   Lab Units 08/23/21  2046   INR  1 03     Results from last 7 days   Lab Units 08/30/21  1103 08/30/21  0720 08/29/21  2115 08/29/21  1605 08/29/21  1056 08/29/21  0606 08/28/21  2056 08/28/21  1608 08/28/21  1109 08/28/21  0520 08/27/21  2001 08/27/21  1544   POC GLUCOSE mg/dl 132 102 184* 132 128 134 208* 161* 138 124 229* 208*               Lines/Drains:  Invasive Devices     Peripheral Intravenous Line            Peripheral IV 08/29/21 Dorsal (posterior); Right Forearm <1 day          Drain            Suprapubic Catheter Non-latex 16 Fr  26 days                      Imaging: No pertinent imaging reviewed      Recent Cultures (last 7 days):   Results from last 7 days   Lab Units 08/23/21 2051   URINE CULTURE  >100,000 cfu/ml Klebsiella oxytoca*  10,000-19,000 cfu/ml Corynebacterium striatum group*  10,000-19,000 cfu/ml Corynebacterium amycolatum*       Last 24 Hours Medication List:   Current Facility-Administered Medications   Medication Dose Route Frequency Provider Last Rate    acetaminophen  650 mg Oral Q6H PRN Enzo Contreras PA-C      amLODIPine  5 mg Oral Daily Asher De Anda PA-C      aspirin  81 mg Oral Daily Asher De Anda PA-C      atorvastatin  40 mg Oral After RIDDHI Monsalve PA-C      carvedilol  25 mg Oral BID With Meals Asher De Anda PA-C      cefTRIAXone  1,000 mg Intravenous Q24H Asher De Anda PA-C 1,000 mg (08/29/21 2228)    cholecalciferol  2,000 Units Oral Daily Asher De Anda PA-C      clopidogrel  75 mg Oral Daily Asher De Anda PA-C      dicyclomine  10 mg Oral 4x Daily (AC & HS) Asher De Anda PA-C      docusate sodium  100 mg Oral BID Asher De Anda PA-C      ferrous sulfate  325 mg Oral Every Other Day Monse Leonardo PA-C      heparin (porcine)  5,000 Units Subcutaneous [facility] Nanci Ayala      HYDROcodone-acetaminophen  1 tablet Oral BID PRN Monse Leonardo PA-C      insulin glargine  50 Units Subcutaneous Q12H [facility] Monse Leonardo PA-C      insulin lispro  2-12 Units Subcutaneous TID AC Monse Leonardo PA-C      isosorbide mononitrate  30 mg Oral Daily Monse Leonardo PA-C      levothyroxine  50 mcg Oral Early Morning Monse Leonardo PA-C      nitroglycerin  0 4 mg Sublingual Q5 Min PRN Monse Leonardo PA-C      nystatin   Topical BID Monse Leonardo PA-C      ondansetron  4 mg Oral Q6H PRN Monse Leonardo PA-C      potassium chloride  20 mEq Oral Once Riki Cherry MD      promethazine  25 mg Intravenous Q12H PRN Monse Leonardo PA-C      saccharomyces boulardii  250 mg Oral BID Monse Leonardo PA-C      [START ON 8/31/2021] sodium chloride  50 mL/hr Intravenous Continuous Riki Cherry MD          Today, Patient Was Seen By: Magali Montgomery PA-C    **Please Note: This note may have been constructed using a voice recognition system  **

## 2021-08-31 ENCOUNTER — APPOINTMENT (OUTPATIENT)
Dept: NON INVASIVE DIAGNOSTICS | Facility: HOSPITAL | Age: 61
DRG: 286 | End: 2021-08-31
Attending: INTERNAL MEDICINE
Payer: COMMERCIAL

## 2021-08-31 LAB
ANION GAP SERPL CALCULATED.3IONS-SCNC: 8 MMOL/L (ref 4–13)
BUN SERPL-MCNC: 53 MG/DL (ref 5–25)
CALCIUM SERPL-MCNC: 8.4 MG/DL (ref 8.3–10.1)
CHLORIDE SERPL-SCNC: 106 MMOL/L (ref 100–108)
CO2 SERPL-SCNC: 27 MMOL/L (ref 21–32)
CREAT SERPL-MCNC: 2.13 MG/DL (ref 0.6–1.3)
GFR SERPL CREATININE-BSD FRML MDRD: 25 ML/MIN/1.73SQ M
GLUCOSE SERPL-MCNC: 122 MG/DL (ref 65–140)
GLUCOSE SERPL-MCNC: 129 MG/DL (ref 65–140)
GLUCOSE SERPL-MCNC: 138 MG/DL (ref 65–140)
GLUCOSE SERPL-MCNC: 170 MG/DL (ref 65–140)
GLUCOSE SERPL-MCNC: 86 MG/DL (ref 65–140)
POTASSIUM SERPL-SCNC: 3.8 MMOL/L (ref 3.5–5.3)
SODIUM SERPL-SCNC: 141 MMOL/L (ref 136–145)

## 2021-08-31 PROCEDURE — 99232 SBSQ HOSP IP/OBS MODERATE 35: CPT | Performed by: INTERNAL MEDICINE

## 2021-08-31 PROCEDURE — 99152 MOD SED SAME PHYS/QHP 5/>YRS: CPT | Performed by: INTERNAL MEDICINE

## 2021-08-31 PROCEDURE — 99232 SBSQ HOSP IP/OBS MODERATE 35: CPT | Performed by: PHYSICIAN ASSISTANT

## 2021-08-31 PROCEDURE — 82948 REAGENT STRIP/BLOOD GLUCOSE: CPT

## 2021-08-31 PROCEDURE — 4A0335C MEASUREMENT OF ARTERIAL FLOW, CORONARY, PERCUTANEOUS APPROACH: ICD-10-PCS | Performed by: INTERNAL MEDICINE

## 2021-08-31 PROCEDURE — C1769 GUIDE WIRE: HCPCS | Performed by: INTERNAL MEDICINE

## 2021-08-31 PROCEDURE — 4A023N7 MEASUREMENT OF CARDIAC SAMPLING AND PRESSURE, LEFT HEART, PERCUTANEOUS APPROACH: ICD-10-PCS | Performed by: INTERNAL MEDICINE

## 2021-08-31 PROCEDURE — 80048 BASIC METABOLIC PNL TOTAL CA: CPT | Performed by: INTERNAL MEDICINE

## 2021-08-31 PROCEDURE — B215YZZ FLUOROSCOPY OF LEFT HEART USING OTHER CONTRAST: ICD-10-PCS | Performed by: INTERNAL MEDICINE

## 2021-08-31 PROCEDURE — 93458 L HRT ARTERY/VENTRICLE ANGIO: CPT | Performed by: INTERNAL MEDICINE

## 2021-08-31 PROCEDURE — C1894 INTRO/SHEATH, NON-LASER: HCPCS | Performed by: INTERNAL MEDICINE

## 2021-08-31 RX ORDER — SODIUM CHLORIDE 9 MG/ML
75 INJECTION, SOLUTION INTRAVENOUS CONTINUOUS
Status: DISPENSED | OUTPATIENT
Start: 2021-08-31 | End: 2021-08-31

## 2021-08-31 RX ORDER — HEPARIN SODIUM 1000 [USP'U]/ML
INJECTION, SOLUTION INTRAVENOUS; SUBCUTANEOUS CODE/TRAUMA/SEDATION MEDICATION
Status: COMPLETED | OUTPATIENT
Start: 2021-08-31 | End: 2021-08-31

## 2021-08-31 RX ORDER — MIDAZOLAM HYDROCHLORIDE 2 MG/2ML
INJECTION, SOLUTION INTRAMUSCULAR; INTRAVENOUS CODE/TRAUMA/SEDATION MEDICATION
Status: COMPLETED | OUTPATIENT
Start: 2021-08-31 | End: 2021-08-31

## 2021-08-31 RX ORDER — ONDANSETRON 2 MG/ML
INJECTION INTRAMUSCULAR; INTRAVENOUS CODE/TRAUMA/SEDATION MEDICATION
Status: COMPLETED | OUTPATIENT
Start: 2021-08-31 | End: 2021-08-31

## 2021-08-31 RX ORDER — LIDOCAINE HYDROCHLORIDE 10 MG/ML
INJECTION, SOLUTION EPIDURAL; INFILTRATION; INTRACAUDAL; PERINEURAL CODE/TRAUMA/SEDATION MEDICATION
Status: COMPLETED | OUTPATIENT
Start: 2021-08-31 | End: 2021-08-31

## 2021-08-31 RX ORDER — FENTANYL CITRATE 50 UG/ML
INJECTION, SOLUTION INTRAMUSCULAR; INTRAVENOUS CODE/TRAUMA/SEDATION MEDICATION
Status: COMPLETED | OUTPATIENT
Start: 2021-08-31 | End: 2021-08-31

## 2021-08-31 RX ORDER — NITROGLYCERIN 20 MG/100ML
INJECTION INTRAVENOUS CODE/TRAUMA/SEDATION MEDICATION
Status: COMPLETED | OUTPATIENT
Start: 2021-08-31 | End: 2021-08-31

## 2021-08-31 RX ORDER — VERAPAMIL HYDROCHLORIDE 2.5 MG/ML
INJECTION, SOLUTION INTRAVENOUS CODE/TRAUMA/SEDATION MEDICATION
Status: COMPLETED | OUTPATIENT
Start: 2021-08-31 | End: 2021-08-31

## 2021-08-31 RX ADMIN — HEPARIN SODIUM 5000 UNITS: 5000 INJECTION INTRAVENOUS; SUBCUTANEOUS at 14:06

## 2021-08-31 RX ADMIN — FENTANYL CITRATE 50 MCG: 50 INJECTION INTRAMUSCULAR; INTRAVENOUS at 14:30

## 2021-08-31 RX ADMIN — HEPARIN SODIUM 5000 UNITS: 5000 INJECTION INTRAVENOUS; SUBCUTANEOUS at 21:50

## 2021-08-31 RX ADMIN — CLOPIDOGREL BISULFATE 75 MG: 75 TABLET ORAL at 08:48

## 2021-08-31 RX ADMIN — CARVEDILOL 25 MG: 25 TABLET, FILM COATED ORAL at 08:44

## 2021-08-31 RX ADMIN — MIDAZOLAM 2 MG: 1 INJECTION INTRAMUSCULAR; INTRAVENOUS at 14:30

## 2021-08-31 RX ADMIN — ASPIRIN 81 MG: 81 TABLET ORAL at 08:45

## 2021-08-31 RX ADMIN — INSULIN GLARGINE 50 UNITS: 100 INJECTION, SOLUTION SUBCUTANEOUS at 21:50

## 2021-08-31 RX ADMIN — INSULIN GLARGINE 50 UNITS: 100 INJECTION, SOLUTION SUBCUTANEOUS at 08:42

## 2021-08-31 RX ADMIN — NYSTATIN: 100000 POWDER TOPICAL at 08:51

## 2021-08-31 RX ADMIN — LEVOTHYROXINE SODIUM 50 MCG: 50 TABLET ORAL at 06:27

## 2021-08-31 RX ADMIN — CARVEDILOL 25 MG: 25 TABLET, FILM COATED ORAL at 16:21

## 2021-08-31 RX ADMIN — DICYCLOMINE HYDROCHLORIDE 10 MG: 10 CAPSULE ORAL at 11:49

## 2021-08-31 RX ADMIN — Medication 250 MG: at 17:14

## 2021-08-31 RX ADMIN — DOCUSATE SODIUM 100 MG: 100 CAPSULE, LIQUID FILLED ORAL at 17:14

## 2021-08-31 RX ADMIN — ISOSORBIDE MONONITRATE 30 MG: 30 TABLET, EXTENDED RELEASE ORAL at 08:48

## 2021-08-31 RX ADMIN — DICYCLOMINE HYDROCHLORIDE 10 MG: 10 CAPSULE ORAL at 06:27

## 2021-08-31 RX ADMIN — ACETYLCYSTEINE 600 MG: 200 SOLUTION ORAL; RESPIRATORY (INHALATION) at 17:14

## 2021-08-31 RX ADMIN — Medication 250 MG: at 08:48

## 2021-08-31 RX ADMIN — ACETYLCYSTEINE 600 MG: 200 SOLUTION ORAL; RESPIRATORY (INHALATION) at 08:43

## 2021-08-31 RX ADMIN — ATORVASTATIN CALCIUM 40 MG: 40 TABLET, FILM COATED ORAL at 17:14

## 2021-08-31 RX ADMIN — DICYCLOMINE HYDROCHLORIDE 10 MG: 10 CAPSULE ORAL at 21:50

## 2021-08-31 RX ADMIN — DICYCLOMINE HYDROCHLORIDE 10 MG: 10 CAPSULE ORAL at 16:21

## 2021-08-31 RX ADMIN — SODIUM CHLORIDE 50 ML/HR: 0.9 INJECTION, SOLUTION INTRAVENOUS at 01:00

## 2021-08-31 RX ADMIN — ONDANSETRON 4 MG: 2 INJECTION INTRAMUSCULAR; INTRAVENOUS at 14:35

## 2021-08-31 RX ADMIN — DOCUSATE SODIUM 100 MG: 100 CAPSULE, LIQUID FILLED ORAL at 08:48

## 2021-08-31 RX ADMIN — LIDOCAINE HYDROCHLORIDE 2 ML: 10 INJECTION, SOLUTION EPIDURAL; INFILTRATION; INTRACAUDAL; PERINEURAL at 14:35

## 2021-08-31 RX ADMIN — VERAPAMIL HYDROCHLORIDE 5 MG: 2.5 INJECTION INTRAVENOUS at 14:37

## 2021-08-31 RX ADMIN — HEPARIN SODIUM 4000 UNITS: 1000 INJECTION INTRAVENOUS; SUBCUTANEOUS at 14:37

## 2021-08-31 RX ADMIN — HYDROCODONE BITARTRATE AND ACETAMINOPHEN 1 TABLET: 5; 325 TABLET ORAL at 08:44

## 2021-08-31 RX ADMIN — SODIUM CHLORIDE 50 ML/HR: 0.9 INJECTION, SOLUTION INTRAVENOUS at 08:49

## 2021-08-31 RX ADMIN — IODIXANOL 30 ML: 270 INJECTION, SOLUTION INTRAVASCULAR at 14:46

## 2021-08-31 RX ADMIN — NYSTATIN: 100000 POWDER TOPICAL at 17:14

## 2021-08-31 RX ADMIN — HEPARIN SODIUM 5000 UNITS: 5000 INJECTION INTRAVENOUS; SUBCUTANEOUS at 06:27

## 2021-08-31 RX ADMIN — NITROGLYCERIN 200 MCG: 20 INJECTION INTRAVENOUS at 14:36

## 2021-08-31 RX ADMIN — Medication 2000 UNITS: at 08:44

## 2021-08-31 RX ADMIN — SODIUM CHLORIDE 75 ML/HR: 0.9 INJECTION, SOLUTION INTRAVENOUS at 15:10

## 2021-08-31 RX ADMIN — AMLODIPINE BESYLATE 5 MG: 5 TABLET ORAL at 08:45

## 2021-08-31 NOTE — ASSESSMENT & PLAN NOTE
Lab Results   Component Value Date    HGBA1C 7 1 (H) 08/03/2021       Recent Labs     08/30/21  1631 08/30/21  2053 08/31/21  0726 08/31/21  1114   POCGLU 170* 236* 129 122       Blood Sugar Average: Last 72 hrs:  · (P) 153 [id number] home regimen Lantus 50 units b i d , Humalog 10 units t i d  with meals and Trulicity once weekly   · Continue Lantus 50 units b i d  while inpatient  · Sliding-scale insulin coverage with Accu-Cheks  · Diabetic diet

## 2021-08-31 NOTE — ASSESSMENT & PLAN NOTE
· History of CAD with multiple stents placed in 2012 and 2017  · Continue Imdur, Plavix, aspirin and Coreg  · Continue daily statin  · Follow-up cardiac catheterization

## 2021-08-31 NOTE — ASSESSMENT & PLAN NOTE
Wt Readings from Last 3 Encounters:   08/30/21 127 kg (279 lb 12 2 oz)   08/27/21 133 kg (292 lb 15 9 oz)   08/03/21 132 kg (290 lb)     · Patient presented to the ED with shortness of breath and weight gain   · Initially required IV Lasix, switch to p o  torsemide 20 mg b i d   · Continue cardiac diet with fluid and salt restriction  · Plan for cardiac catheterization today  · Continue holding diuretics  · Gentle IV fluids pre and post catheterization  · Echocardiogram showing EF of 45%, mild left ventricular hypertrophy, grade 2 diastolic dysfunction  · Cardiology following-appreciate recommendations following cardiac catheterization

## 2021-08-31 NOTE — PLAN OF CARE
Problem: Potential for Falls  Goal: Patient will remain free of falls  Description: INTERVENTIONS:  - Educate patient/family on patient safety including physical limitations  - Instruct patient to call for assistance with activity   - Consult OT/PT to assist with strengthening/mobility   - Keep Call bell within reach  - Keep bed low and locked with side rails adjusted as appropriate  - Keep care items and personal belongings within reach  - Initiate and maintain comfort rounds  - Make Fall Risk Sign visible to staff  - Offer Toileting every 2 Hours, in advance of need  - Initiate/Maintain bed alarm  - Apply yellow socks and bracelet for high fall risk patients  - Consider moving patient to room near nurses station  Outcome: Progressing     Problem: CARDIOVASCULAR - ADULT  Goal: Maintains optimal cardiac output and hemodynamic stability  Description: INTERVENTIONS:  - Monitor I/O, vital signs and rhythm  - Monitor for S/S and trends of decreased cardiac output  - Administer and titrate ordered vasoactive medications to optimize hemodynamic stability  - Assess quality of pulses, skin color and temperature  - Assess for signs of decreased coronary artery perfusion  - Instruct patient to report change in severity of symptoms  Outcome: Progressing  Goal: Absence of cardiac dysrhythmias or at baseline rhythm  Description: INTERVENTIONS:  - Continuous cardiac monitoring, vital signs, obtain 12 lead EKG if ordered  - Administer antiarrhythmic and heart rate control medications as ordered  - Monitor electrolytes and administer replacement therapy as ordered  Outcome: Progressing     Problem: RESPIRATORY - ADULT  Goal: Achieves optimal ventilation and oxygenation  Description: INTERVENTIONS:  - Assess for changes in respiratory status  - Assess for changes in mentation and behavior  - Position to facilitate oxygenation and minimize respiratory effort  - Oxygen administered by appropriate delivery if ordered  - Initiate smoking cessation education as indicated  - Encourage broncho-pulmonary hygiene including cough, deep breathe, Incentive Spirometry  - Assess the need for suctioning and aspirate as needed  - Assess and instruct to report SOB or any respiratory difficulty  - Respiratory Therapy support as indicated  Outcome: Progressing     Problem: GASTROINTESTINAL - ADULT  Goal: Minimal or absence of nausea and/or vomiting  Description: INTERVENTIONS:  - Administer IV fluids if ordered to ensure adequate hydration  - Maintain NPO status until nausea and vomiting are resolved  - Nasogastric tube if ordered  - Administer ordered antiemetic medications as needed  - Provide nonpharmacologic comfort measures as appropriate  - Advance diet as tolerated, if ordered  - Consider nutrition services referral to assist patient with adequate nutrition and appropriate food choices  Outcome: Progressing  Goal: Maintains or returns to baseline bowel function  Description: INTERVENTIONS:  - Assess bowel function  - Encourage oral fluids to ensure adequate hydration  - Administer IV fluids if ordered to ensure adequate hydration  - Administer ordered medications as needed  - Encourage mobilization and activity  - Consider nutritional services referral to assist patient with adequate nutrition and appropriate food choices  Outcome: Progressing  Goal: Maintains adequate nutritional intake  Description: INTERVENTIONS:  - Monitor percentage of each meal consumed  - Identify factors contributing to decreased intake, treat as appropriate  - Assist with meals as needed  - Monitor I&O, weight, and lab values if indicated  - Obtain nutrition services referral as needed  Outcome: Progressing  Goal: Establish and maintain optimal ostomy function  Description: INTERVENTIONS:  - Assess bowel function  - Encourage oral fluids to ensure adequate hydration  - Administer IV fluids if ordered to ensure adequate hydration   - Administer ordered medications as needed  - Encourage mobilization and activity  - Nutrition services referral to assist patient with appropriate food choices  - Assess stoma site  - Consider wound care consult   Outcome: Progressing  Goal: Oral mucous membranes remain intact  Description: INTERVENTIONS  - Assess oral mucosa and hygiene practices  - Implement preventative oral hygiene regimen  - Implement oral medicated treatments as ordered  - Initiate Nutrition services referral as needed  Outcome: Progressing     Problem: GENITOURINARY - ADULT  Goal: Maintains or returns to baseline urinary function  Description: INTERVENTIONS:  - Assess urinary function  - Encourage oral fluids to ensure adequate hydration if ordered  - Administer IV fluids as ordered to ensure adequate hydration  - Administer ordered medications as needed  - Offer frequent toileting  - Follow urinary retention protocol if ordered  Outcome: Progressing  Goal: Absence of urinary retention  Description: INTERVENTIONS:  - Assess patients ability to void and empty bladder  - Monitor I/O  - Bladder scan as needed  - Discuss with physician/AP medications to alleviate retention as needed  - Discuss catheterization for long term situations as appropriate  Outcome: Progressing  Goal: Urinary catheter remains patent  Description: INTERVENTIONS:  - Assess patency of urinary catheter  - If patient has a chronic morales, consider changing catheter if non-functioning  - Follow guidelines for intermittent irrigation of non-functioning urinary catheter  Outcome: Progressing     Problem: METABOLIC, FLUID AND ELECTROLYTES - ADULT  Goal: Electrolytes maintained within normal limits  Description: INTERVENTIONS:  - Monitor labs and assess patient for signs and symptoms of electrolyte imbalances  - Administer electrolyte replacement as ordered  - Monitor response to electrolyte replacements, including repeat lab results as appropriate  - Instruct patient on fluid and nutrition as appropriate  Outcome: Progressing  Goal: Fluid balance maintained  Description: INTERVENTIONS:  - Monitor labs   - Monitor I/O and WT  - Instruct patient on fluid and nutrition as appropriate  - Assess for signs & symptoms of volume excess or deficit  Outcome: Progressing  Goal: Glucose maintained within target range  Description: INTERVENTIONS:  - Monitor Blood Glucose as ordered  - Assess for signs and symptoms of hyperglycemia and hypoglycemia  - Administer ordered medications to maintain glucose within target range  - Assess nutritional intake and initiate nutrition service referral as needed  Outcome: Progressing     Problem: HEMATOLOGIC - ADULT  Goal: Maintains hematologic stability  Description: INTERVENTIONS  - Assess for signs and symptoms of bleeding or hemorrhage  - Monitor labs  - Administer supportive blood products/factors as ordered and appropriate  Outcome: Progressing     Problem: MUSCULOSKELETAL - ADULT  Goal: Maintain or return mobility to safest level of function  Description: INTERVENTIONS:  - Assess patient's ability to carry out ADLs; assess patient's baseline for ADL function and identify physical deficits which impact ability to perform ADLs (bathing, care of mouth/teeth, toileting, grooming, dressing, etc )  - Assess/evaluate cause of self-care deficits   - Assess range of motion  - Assess patient's mobility  - Assess patient's need for assistive devices and provide as appropriate  - Encourage maximum independence but intervene and supervise when necessary  - Involve family in performance of ADLs  - Assess for home care needs following discharge   - Consider OT consult to assist with ADL evaluation and planning for discharge  - Provide patient education as appropriate  Outcome: Progressing  Goal: Maintain proper alignment of affected body part  Description: INTERVENTIONS:  - Support, maintain and protect limb and body alignment  - Provide patient/ family with appropriate education  Outcome: Progressing Problem: PAIN - ADULT  Goal: Verbalizes/displays adequate comfort level or baseline comfort level  Description: Interventions:  - Encourage patient to monitor pain and request assistance  - Assess pain using appropriate pain scale  - Administer analgesics based on type and severity of pain and evaluate response  - Implement non-pharmacological measures as appropriate and evaluate response  - Consider cultural and social influences on pain and pain management  - Notify physician/advanced practitioner if interventions unsuccessful or patient reports new pain  Outcome: Progressing     Problem: INFECTION - ADULT  Goal: Absence or prevention of progression during hospitalization  Description: INTERVENTIONS:  - Assess and monitor for signs and symptoms of infection  - Monitor lab/diagnostic results  - Monitor all insertion sites, i e  indwelling lines, tubes, and drains  - Monitor endotracheal if appropriate and nasal secretions for changes in amount and color  - [place] appropriate cooling/warming therapies per order  - Administer medications as ordered  - Instruct and encourage patient and family to use good hand hygiene technique  - Identify and instruct in appropriate isolation precautions for identified infection/condition  Outcome: Progressing  Goal: Absence of fever/infection during neutropenic period  Description: INTERVENTIONS:  - Monitor WBC    Outcome: Progressing     Problem: SAFETY ADULT  Goal: Patient will remain free of falls  Description: INTERVENTIONS:  - Educate patient/family on patient safety including physical limitations  - Instruct patient to call for assistance with activity   - Consult OT/PT to assist with strengthening/mobility   - Keep Call bell within reach  - Keep bed low and locked with side rails adjusted as appropriate  - Keep care items and personal belongings within reach  - Initiate and maintain comfort rounds  - Make Fall Risk Sign visible to staff  - Offer Toileting every 2 Hours, in advance of need  - Initiate/Maintain bed alarm  - Apply yellow socks and bracelet for high fall risk patients  - Consider moving patient to room near nurses station  Outcome: Progressing  Goal: Maintain or return to baseline ADL function  Description: INTERVENTIONS:  -  Assess patient's ability to carry out ADLs; assess patient's baseline for ADL function and identify physical deficits which impact ability to perform ADLs (bathing, care of mouth/teeth, toileting, grooming, dressing, etc )  - Assess/evaluate cause of self-care deficits   - Assess range of motion  - Assess patient's mobility; develop plan if impaired  - Assess patient's need for assistive devices and provide as appropriate  - Encourage maximum independence but intervene and supervise when necessary  - Involve family in performance of ADLs  - Assess for home care needs following discharge   - Consider OT consult to assist with ADL evaluation and planning for discharge  - Provide patient education as appropriate  Outcome: Progressing  Goal: Maintains/Returns to pre admission functional level  Description: INTERVENTIONS:  - Perform BMAT or MOVE assessment daily    - Set and communicate daily mobility goal to care team and patient/family/caregiver     - Collaborate with rehabilitation services on mobility goals if consulted  - Out of bed for toileting  - Record patient progress and toleration of activity level   Outcome: Progressing     Problem: DISCHARGE PLANNING  Goal: Discharge to home or other facility with appropriate resources  Description: INTERVENTIONS:  - Identify barriers to discharge w/patient and caregiver  - Arrange for needed discharge resources and transportation as appropriate  - Identify discharge learning needs (meds, wound care, etc )  - Arrange for interpretive services to assist at discharge as needed  - Refer to Case Management Department for coordinating discharge planning if the patient needs post-hospital services based on physician/advanced practitioner order or complex needs related to functional status, cognitive ability, or social support system  Outcome: Progressing     Problem: Knowledge Deficit  Goal: Patient/family/caregiver demonstrates understanding of disease process, treatment plan, medications, and discharge instructions  Description: Complete learning assessment and assess knowledge base  Interventions:  - Provide teaching at level of understanding  - Provide teaching via preferred learning methods  Outcome: Progressing     Problem: MOBILITY - ADULT  Goal: Maintain or return to baseline ADL function  Description: INTERVENTIONS:  -  Assess patient's ability to carry out ADLs; assess patient's baseline for ADL function and identify physical deficits which impact ability to perform ADLs (bathing, care of mouth/teeth, toileting, grooming, dressing, etc )  - Assess/evaluate cause of self-care deficits   - Assess range of motion  - Assess patient's mobility; develop plan if impaired  - Assess patient's need for assistive devices and provide as appropriate  - Encourage maximum independence but intervene and supervise when necessary  - Involve family in performance of ADLs  - Assess for home care needs following discharge   - Consider OT consult to assist with ADL evaluation and planning for discharge  - Provide patient education as appropriate  Outcome: Progressing  Goal: Maintains/Returns to pre admission functional level  Description: INTERVENTIONS:  - Perform BMAT or MOVE assessment daily    - Set and communicate daily mobility goal to care team and patient/family/caregiver     - Collaborate with rehabilitation services on mobility goals if consulted  - Out of bed for toileting  - Record patient progress and toleration of activity level   Outcome: Progressing
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
## 2021-08-31 NOTE — ASSESSMENT & PLAN NOTE
Lab Results   Component Value Date    EGFR 25 08/31/2021    EGFR 20 08/30/2021    EGFR 21 (L) 08/29/2021    CREATININE 2 13 (H) 08/31/2021    CREATININE 2 60 (H) 08/30/2021    CREATININE 2 44 (H) 08/29/2021     · History of CKD stage 4   · Creatinine improved today to 2 13, baseline 2 2-2 6   · Nephrology following  · Will need close monitoring after cardiac catheterization  · Gentle IV fluids pre and post catheterization

## 2021-08-31 NOTE — ASSESSMENT & PLAN NOTE
· Hemoglobin 8 8   · Likely component of CKD stage 4  · Continue iron  · Fecal occult blood negative  · Repeat CBC in a m

## 2021-08-31 NOTE — PLAN OF CARE
Problem: Potential for Falls  Goal: Patient will remain free of falls  Description: INTERVENTIONS:  - Educate patient/family on patient safety including physical limitations  - Instruct patient to call for assistance with activity   - Consult OT/PT to assist with strengthening/mobility   - Keep Call bell within reach  - Keep bed low and locked with side rails adjusted as appropriate  - Keep care items and personal belongings within reach  - Initiate and maintain comfort rounds  - Make Fall Risk Sign visible to staff  - Offer Toileting every  Hours, in advance of need  - Initiate/Maintain alarm  - Obtain necessary fall risk management equipment:   - Apply yellow socks and bracelet for high fall risk patients  - Consider moving patient to room near nurses station  Outcome: Progressing     Problem: CARDIOVASCULAR - ADULT  Goal: Maintains optimal cardiac output and hemodynamic stability  Description: INTERVENTIONS:  - Monitor I/O, vital signs and rhythm  - Monitor for S/S and trends of decreased cardiac output  - Administer and titrate ordered vasoactive medications to optimize hemodynamic stability  - Assess quality of pulses, skin color and temperature  - Assess for signs of decreased coronary artery perfusion  - Instruct patient to report change in severity of symptoms  Outcome: Progressing  Goal: Absence of cardiac dysrhythmias or at baseline rhythm  Description: INTERVENTIONS:  - Continuous cardiac monitoring, vital signs, obtain 12 lead EKG if ordered  - Administer antiarrhythmic and heart rate control medications as ordered  - Monitor electrolytes and administer replacement therapy as ordered  Outcome: Progressing     Problem: RESPIRATORY - ADULT  Goal: Achieves optimal ventilation and oxygenation  Description: INTERVENTIONS:  - Assess for changes in respiratory status  - Assess for changes in mentation and behavior  - Position to facilitate oxygenation and minimize respiratory effort  - Oxygen administered by appropriate delivery if ordered  - Initiate smoking cessation education as indicated  - Encourage broncho-pulmonary hygiene including cough, deep breathe, Incentive Spirometry  - Assess the need for suctioning and aspirate as needed  - Assess and instruct to report SOB or any respiratory difficulty  - Respiratory Therapy support as indicated  Outcome: Progressing     Problem: GASTROINTESTINAL - ADULT  Goal: Minimal or absence of nausea and/or vomiting  Description: INTERVENTIONS:  - Administer IV fluids if ordered to ensure adequate hydration  - Maintain NPO status until nausea and vomiting are resolved  - Nasogastric tube if ordered  - Administer ordered antiemetic medications as needed  - Provide nonpharmacologic comfort measures as appropriate  - Advance diet as tolerated, if ordered  - Consider nutrition services referral to assist patient with adequate nutrition and appropriate food choices  Outcome: Progressing  Goal: Maintains or returns to baseline bowel function  Description: INTERVENTIONS:  - Assess bowel function  - Encourage oral fluids to ensure adequate hydration  - Administer IV fluids if ordered to ensure adequate hydration  - Administer ordered medications as needed  - Encourage mobilization and activity  - Consider nutritional services referral to assist patient with adequate nutrition and appropriate food choices  Outcome: Progressing  Goal: Maintains adequate nutritional intake  Description: INTERVENTIONS:  - Monitor percentage of each meal consumed  - Identify factors contributing to decreased intake, treat as appropriate  - Assist with meals as needed  - Monitor I&O, weight, and lab values if indicated  - Obtain nutrition services referral as needed  Outcome: Progressing  Goal: Establish and maintain optimal ostomy function  Description: INTERVENTIONS:  - Assess bowel function  - Encourage oral fluids to ensure adequate hydration  - Administer IV fluids if ordered to ensure adequate hydration - Administer ordered medications as needed  - Encourage mobilization and activity  - Nutrition services referral to assist patient with appropriate food choices  - Assess stoma site  - Consider wound care consult   Outcome: Progressing  Goal: Oral mucous membranes remain intact  Description: INTERVENTIONS  - Assess oral mucosa and hygiene practices  - Implement preventative oral hygiene regimen  - Implement oral medicated treatments as ordered  - Initiate Nutrition services referral as needed  Outcome: Progressing     Problem: GENITOURINARY - ADULT  Goal: Maintains or returns to baseline urinary function  Description: INTERVENTIONS:  - Assess urinary function  - Encourage oral fluids to ensure adequate hydration if ordered  - Administer IV fluids as ordered to ensure adequate hydration  - Administer ordered medications as needed  - Offer frequent toileting  - Follow urinary retention protocol if ordered  Outcome: Progressing  Goal: Absence of urinary retention  Description: INTERVENTIONS:  - Assess patients ability to void and empty bladder  - Monitor I/O  - Bladder scan as needed  - Discuss with physician/AP medications to alleviate retention as needed  - Discuss catheterization for long term situations as appropriate  Outcome: Progressing  Goal: Urinary catheter remains patent  Description: INTERVENTIONS:  - Assess patency of urinary catheter  - If patient has a chronic morales, consider changing catheter if non-functioning  - Follow guidelines for intermittent irrigation of non-functioning urinary catheter  Outcome: Progressing     Problem: METABOLIC, FLUID AND ELECTROLYTES - ADULT  Goal: Electrolytes maintained within normal limits  Description: INTERVENTIONS:  - Monitor labs and assess patient for signs and symptoms of electrolyte imbalances  - Administer electrolyte replacement as ordered  - Monitor response to electrolyte replacements, including repeat lab results as appropriate  - Instruct patient on fluid and nutrition as appropriate  Outcome: Progressing  Goal: Fluid balance maintained  Description: INTERVENTIONS:  - Monitor labs   - Monitor I/O and WT  - Instruct patient on fluid and nutrition as appropriate  - Assess for signs & symptoms of volume excess or deficit  Outcome: Progressing  Goal: Glucose maintained within target range  Description: INTERVENTIONS:  - Monitor Blood Glucose as ordered  - Assess for signs and symptoms of hyperglycemia and hypoglycemia  - Administer ordered medications to maintain glucose within target range  - Assess nutritional intake and initiate nutrition service referral as needed  Outcome: Progressing     Problem: HEMATOLOGIC - ADULT  Goal: Maintains hematologic stability  Description: INTERVENTIONS  - Assess for signs and symptoms of bleeding or hemorrhage  - Monitor labs  - Administer supportive blood products/factors as ordered and appropriate  Outcome: Progressing     Problem: MUSCULOSKELETAL - ADULT  Goal: Maintain or return mobility to safest level of function  Description: INTERVENTIONS:  - Assess patient's ability to carry out ADLs; assess patient's baseline for ADL function and identify physical deficits which impact ability to perform ADLs (bathing, care of mouth/teeth, toileting, grooming, dressing, etc )  - Assess/evaluate cause of self-care deficits   - Assess range of motion  - Assess patient's mobility  - Assess patient's need for assistive devices and provide as appropriate  - Encourage maximum independence but intervene and supervise when necessary  - Involve family in performance of ADLs  - Assess for home care needs following discharge   - Consider OT consult to assist with ADL evaluation and planning for discharge  - Provide patient education as appropriate  Outcome: Progressing  Goal: Maintain proper alignment of affected body part  Description: INTERVENTIONS:  - Support, maintain and protect limb and body alignment  - Provide patient/ family with appropriate education  Outcome: Progressing     Problem: PAIN - ADULT  Goal: Verbalizes/displays adequate comfort level or baseline comfort level  Description: Interventions:  - Encourage patient to monitor pain and request assistance  - Assess pain using appropriate pain scale  - Administer analgesics based on type and severity of pain and evaluate response  - Implement non-pharmacological measures as appropriate and evaluate response  - Consider cultural and social influences on pain and pain management  - Notify physician/advanced practitioner if interventions unsuccessful or patient reports new pain  Outcome: Progressing     Problem: INFECTION - ADULT  Goal: Absence or prevention of progression during hospitalization  Description: INTERVENTIONS:  - Assess and monitor for signs and symptoms of infection  - Monitor lab/diagnostic results  - Monitor all insertion sites, i e  indwelling lines, tubes, and drains  - Monitor endotracheal if appropriate and nasal secretions for changes in amount and color  - [place] appropriate cooling/warming therapies per order  - Administer medications as ordered  - Instruct and encourage patient and family to use good hand hygiene technique  - Identify and instruct in appropriate isolation precautions for identified infection/condition  Outcome: Progressing  Goal: Absence of fever/infection during neutropenic period  Description: INTERVENTIONS:  - Monitor WBC    Outcome: Progressing     Problem: SAFETY ADULT  Goal: Patient will remain free of falls  Description: INTERVENTIONS:  - Educate patient/family on patient safety including physical limitations  - Instruct patient to call for assistance with activity   - Consult OT/PT to assist with strengthening/mobility   - Keep Call bell within reach  - Keep bed low and locked with side rails adjusted as appropriate  - Keep care items and personal belongings within reach  - Initiate and maintain comfort rounds  - Make Fall Risk Sign visible to staff  - Offer Toileting every  Hours, in advance of need  - Initiate/Maintain alarm  - Obtain necessary fall risk management equipment:   - Apply yellow socks and bracelet for high fall risk patients  - Consider moving patient to room near nurses station  Outcome: Progressing  Goal: Maintain or return to baseline ADL function  Description: INTERVENTIONS:  -  Assess patient's ability to carry out ADLs; assess patient's baseline for ADL function and identify physical deficits which impact ability to perform ADLs (bathing, care of mouth/teeth, toileting, grooming, dressing, etc )  - Assess/evaluate cause of self-care deficits   - Assess range of motion  - Assess patient's mobility; develop plan if impaired  - Assess patient's need for assistive devices and provide as appropriate  - Encourage maximum independence but intervene and supervise when necessary  - Involve family in performance of ADLs  - Assess for home care needs following discharge   - Consider OT consult to assist with ADL evaluation and planning for discharge  - Provide patient education as appropriate  Outcome: Progressing  Goal: Maintains/Returns to pre admission functional level  Description: INTERVENTIONS:  - Perform BMAT or MOVE assessment daily    - Set and communicate daily mobility goal to care team and patient/family/caregiver  - Collaborate with rehabilitation services on mobility goals if consulted  - Perform Range of Motion  times a day  - Reposition patient every  hours    - Dangle patient  times a day  - Stand patient  times a day  - Ambulate patient  times a day  - Out of bed to chair  times a day   - Out of bed for meals  times a day  - Out of bed for toileting  - Record patient progress and toleration of activity level   Outcome: Progressing     Problem: DISCHARGE PLANNING  Goal: Discharge to home or other facility with appropriate resources  Description: INTERVENTIONS:  - Identify barriers to discharge w/patient and caregiver  - Arrange for needed discharge resources and transportation as appropriate  - Identify discharge learning needs (meds, wound care, etc )  - Arrange for interpretive services to assist at discharge as needed  - Refer to Case Management Department for coordinating discharge planning if the patient needs post-hospital services based on physician/advanced practitioner order or complex needs related to functional status, cognitive ability, or social support system  Outcome: Progressing     Problem: Knowledge Deficit  Goal: Patient/family/caregiver demonstrates understanding of disease process, treatment plan, medications, and discharge instructions  Description: Complete learning assessment and assess knowledge base  Interventions:  - Provide teaching at level of understanding  - Provide teaching via preferred learning methods  Outcome: Progressing     Problem: MOBILITY - ADULT  Goal: Maintain or return to baseline ADL function  Description: INTERVENTIONS:  -  Assess patient's ability to carry out ADLs; assess patient's baseline for ADL function and identify physical deficits which impact ability to perform ADLs (bathing, care of mouth/teeth, toileting, grooming, dressing, etc )  - Assess/evaluate cause of self-care deficits   - Assess range of motion  - Assess patient's mobility; develop plan if impaired  - Assess patient's need for assistive devices and provide as appropriate  - Encourage maximum independence but intervene and supervise when necessary  - Involve family in performance of ADLs  - Assess for home care needs following discharge   - Consider OT consult to assist with ADL evaluation and planning for discharge  - Provide patient education as appropriate  Outcome: Progressing  Goal: Maintains/Returns to pre admission functional level  Description: INTERVENTIONS:  - Perform BMAT or MOVE assessment daily    - Set and communicate daily mobility goal to care team and patient/family/caregiver     - Collaborate with rehabilitation services on mobility goals if consulted  - Perform Range of Motion  times a day  - Reposition patient every  hours    - Dangle patient  times a day  - Stand patient  times a day  - Ambulate patient  times a day  - Out of bed to chair  times a day   - Out of bed for meals  times a day  - Out of bed for toileting  - Record patient progress and toleration of activity level   Outcome: Progressing

## 2021-08-31 NOTE — PROGRESS NOTES
Progress Note - Nephrology   Milena Rivera 64 y o  female MRN: [id number]  Unit/Bed#: E4 -01 Encounter: [id number]    LATE NOTE  - patient seen by me at 1:00pm   Assessment / Plan:  1  CKD stage 4 likely d/t DM/HTN/obesity related FSGS - b/l sCr 2 3-2 6, currently below baseline, follows outpatient with Dr Rosi Garrido, plan for cardiac cath today  On IVF/NAC for RAMESH prevention  Continue holding diuretics  Monitor renal function for RAMESH  F/u am BMP  2  HTN - BP well controlled on current regimen, continue amlodipine 5 mg p o  daily, carvedilol 25 mg p o  b i d , Imdur 30 mg p o  daily with hold parameters, avoid relative hypotension   3  Anemia-continue oral iron, hemoglobin 8 8, monitor CBC, transfuse p r n   4  Neurogenic bladder with suprapubic catheter-urology consult   5  Nephrotic range proteinuria likely due to diabetic nephropathy and FSGS-previous workup negative for monoclonal gammopathy, follow outpatient, avoiding ACE-inhibitor/Arb right now in setting of IV dye administration   6  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%, grade 2 diastolic dysfunction, previously on IV diuretics, torsemide on hold, consider re-initiation status post cardiac catheterization based on volume status, cardiology follows  7  History of CAD-status post PCI x5 in  and 2017, for cardiac catheterization today, cardiology follows        Subjective:   She denies chest pain or shortness of breath  She is for cardiac catheterization today  No complaints  Objective:     Vitals: Blood pressure 142/95, pulse 61, temperature 97 5 °F (36 4 °C), temperature source Temporal, resp  rate 18, height 5' 8" (1 727 m), weight 127 kg (279 lb 12 2 oz), SpO2 96 %, not currently breastfeeding  ,Body mass index is 42 54 kg/m²  Temp (24hrs), Av 2 °F (36 2 °C), Min:96 8 °F (36 °C), Max:97 9 °F (36 6 °C)      Weight (last 2 days)     Date/Time   Weight    21 0600   127 (279 76)                Intake/Output Summary (Last 24 hours) at 2021 [address] filed at 8/31/2021 1515  Gross per 24 hour   Intake 800 83 ml   Output 1775 ml   Net -974 17 ml     I/O last 24 hours: In: 800 8 [P O :100; I V :700 8]  Out: 1975 [Urine:1975]        Physical Exam:   Physical Exam  Vitals and nursing note reviewed  Constitutional:       General: She is not in acute distress  Appearance: She is well-developed  She is obese  She is not diaphoretic  HENT:      Head: Normocephalic and atraumatic  Mouth/Throat:      Pharynx: No oropharyngeal exudate  Eyes:      General: No scleral icterus  Right eye: No discharge  Left eye: No discharge  Comments: eyeglasses   Neck:      Thyroid: No thyromegaly  Cardiovascular:      Rate and Rhythm: Normal rate and regular rhythm  Heart sounds: No murmur heard  Pulmonary:      Effort: Pulmonary effort is normal  No respiratory distress  Breath sounds: Normal breath sounds  No wheezing  Abdominal:      General: Bowel sounds are normal  There is no distension  Palpations: Abdomen is soft  Genitourinary:     Comments: SPT  Musculoskeletal:      Cervical back: Normal range of motion and neck supple  Skin:     General: Skin is warm and dry  Findings: No rash  Neurological:      General: No focal deficit present  Mental Status: She is alert  Motor: No abnormal muscle tone  Comments: awake   Psychiatric:         Mood and Affect: Mood normal          Behavior: Behavior normal          Invasive Devices     Peripheral Intravenous Line            Peripheral IV 08/29/21 Dorsal (posterior); Right Forearm 1 day          Drain            Suprapubic Catheter Non-latex 16 Fr  1 day                Medications:    Scheduled Meds:  Current Facility-Administered Medications   Medication Dose Route Frequency Provider Last Rate    acetaminophen  650 mg Oral Q6H PRN Enzo Contreras PA-C      acetylcysteine  600 mg Oral BID Jose Reyes DO      amLODIPine  5 mg Oral Daily Naveen Plasencia PA-C      aspirin  81 mg Oral Daily Naveen Plasencia PA-C      atorvastatin  40 mg Oral After Dinner Naveen Plasencia PA-C      carvedilol  25 mg Oral BID With Meals Naveen Plasencia [place]      cholecalciferol  2,000 Units Oral Daily Naveen Plasencia PA-C      clopidogrel  75 mg Oral Daily Naveen Plasencia PA-C      dicyclomine  10 mg Oral 4x Daily (AC & HS) Naveen Plasencia PA-C      docusate sodium  100 mg Oral BID Naveen Plasencia PA-C      ferrous sulfate  325 mg Oral Every Other Day Naveen Plasencia PA-C      heparin (porcine)  5,000 Units Subcutaneous [address] Naveen Plasencia [place]      HYDROcodone-acetaminophen  1 tablet Oral BID PRN Naveen Plasencia PA-C      insulin glargine  50 Units Subcutaneous Q12H [address] Naveen Plasencia PA-C      insulin lispro  2-12 Units Subcutaneous TID AC Naveen Plasencia PA-C      isosorbide mononitrate  30 mg Oral Daily Naveen Plasencia PA-C      levothyroxine  50 mcg Oral Early Morning Naveen Plasencia PA-C      nitroglycerin  0 4 mg Sublingual Q5 Min PRN Naveen Plasencia PA-C      nystatin   Topical BID Naveen Plasencia PA-C      ondansetron  4 mg Oral Q6H PRN Naveen Plasencia PA-C      promethazine  25 mg Intravenous Q12H PRN Naveen Plasencia PA-C      saccharomyces boulardii  250 mg Oral BID Naveen Plasencia PA-C      sodium chloride  75 mL/hr Intravenous Continuous Avatr Galvez MD 75 mL/hr (08/31/21 1510)       PRN Meds:   acetaminophen    HYDROcodone-acetaminophen    nitroglycerin    ondansetron    promethazine    Continuous Infusions:sodium chloride, 75 mL/hr, Last Rate: 75 mL/hr (08/31/21 1510)            LAB RESULTS:      Results from last 7 days   Lab Units 08/31/21  0743 08/30/21  0516 08/29/21  0750 08/29/21  0503 08/28/21  0447 08/27/21  0454 08/26/21  0444 08/25/21  0507   WBC Thousand/uL  --   --  10 00  --  9 60 8 00 8 30 8 20   HEMOGLOBIN g/dL  --   --  8 8*  --  9 2* 8 7* 9 0* 8 7*   HEMATOCRIT %  -- --  26 1*  --  28 3* 26 5* 26 7* 26 0*   PLATELETS Thousands/uL  --   --  224  --  219 210 220 213   NEUTROS PCT %  --   --  69*  --  64 67* 68* 66*   LYMPHS PCT %  --   --  19*  --  22* 21* 19* 19*   MONOS PCT %  --   --  8  --  11* 9 9 10   EOS PCT %  --   --  3  --  3 3 3 4   POTASSIUM mmol/L 3 8 3 5  --  3 8 4 0 4 2 4 5 4 8   CHLORIDE mmol/L 106 105  --  104 105 106 106 109*   CO2 mmol/L 27 26  --  29 28 28 24 26   BUN mg/dL 53* 57*  --  57* 54* 54* 56* 57*   CREATININE mg/dL 2 13* 2 60*  --  2 44* 2 22* 2 13* 2 31* 2 41*   CALCIUM mg/dL 8 4 8 2*  --  8 6 8 9 9 0 8 9 8 9   ALK PHOS U/L  --   --   --   --  101 98  --  99   ALT U/L  --   --   --   --  15 16  --  14   AST U/L  --   --   --   --  28 27  --  21   MAGNESIUM mg/dL  --   --   --  1 7 1 7 1 7 1 9  --    PHOSPHORUS mg/dL  --   --   --  5 8* 5 4* 5 5* 5 6*  --        CUTURES:  Lab Results   Component Value Date    URINECX >100,000 cfu/ml Klebsiella oxytoca (A) 08/23/2021    URINECX (A) 08/23/2021     10,000-19,000 cfu/ml Corynebacterium striatum group    URINECX 10,000-19,000 cfu/ml Corynebacterium amycolatum (A) 08/23/2021                 Portions of the record may have been created with voice recognition software  Occasional wrong word or "sound a like" substitutions may have occurred due to the inherent limitations of voice recognition software  Read the chart carefully and recognize, using context, where substitutions have occurred  If you have any questions, please contact the dictating provider

## 2021-08-31 NOTE — PROGRESS NOTES
[address] [facility]  Progress Note - Max Danielle 1960, 64 y o  female MRN: [id number]  Unit/Bed#: E4 -01 Encounter: [id number]  Primary Care Provider: CHERELLE Romo   Date and time admitted to hospital: 8/29/2021  2:49 PM    * New onset of congestive heart failure ([facility] [address] )  Assessment & Plan  Wt Readings from Last 3 Encounters:   08/30/21 127 kg (279 lb 12 2 oz)   08/27/21 133 kg (292 lb 15 9 oz)   08/03/21 132 kg (290 lb)     · Patient presented to the ED with shortness of breath and weight gain   · Initially required IV Lasix, switch to p o  torsemide 20 mg b i d   · Continue cardiac diet with fluid and salt restriction  · Plan for cardiac catheterization today  · Continue holding diuretics  · Gentle IV fluids pre and post catheterization  · Echocardiogram showing EF of 45%, mild left ventricular hypertrophy, grade 2 diastolic dysfunction  · Cardiology following-appreciate recommendations following cardiac catheterization    Urinary tract infection associated with cystostomy catheter ([facility] )  Assessment & Plan  · History of suprapubic catheter with neurogenic bladder   · Was found to have positive UA at time of admission  · Received IV ceftriaxone, will discontinue as patient has completed 7 day course  · Urology performed catheter exchanged yesterday    CKD (chronic kidney disease) stage 4, GFR 15-29 ml/min [facility])  Assessment & Plan  Lab Results   Component Value Date    EGFR 25 08/31/2021    EGFR 20 08/30/2021    EGFR 21 (L) 08/29/2021    CREATININE 2 13 (H) 08/31/2021    CREATININE 2 60 (H) 08/30/2021    CREATININE 2 44 (H) 08/29/2021     · History of CKD stage 4   · Creatinine improved today to 2 13, baseline 2 2-2 6   · Nephrology following  · Will need close monitoring after cardiac catheterization  · Gentle IV fluids pre and post catheterization    HTN (hypertension)  Assessment & Plan  · BP elevated today  · Continue Norvasc, Coreg   · Demadex on hold given cardiac catheterization    Normochromic normocytic anemia  Assessment & Plan  · Hemoglobin 8 8   · Likely component of CKD stage 4  · Continue iron  · Fecal occult blood negative  · Repeat CBC in a m     CAD (coronary artery disease)  Assessment & Plan  · History of CAD with multiple stents placed in 2012 and 2017  · Continue Imdur, Plavix, aspirin and Coreg  · Continue daily statin  · Follow-up cardiac catheterization    Type 2 diabetes mellitus with diabetic polyneuropathy, with long-term current use of insulin [facility])  Assessment & Plan  Lab Results   Component Value Date    HGBA1C 7 1 (H) 08/03/2021       Recent Labs     08/30/21  1631 08/30/21  2053 08/31/21  0726 08/31/21  1114   POCGLU 170* 236* 129 122       Blood Sugar Average: Last 72 hrs:  · (P) 153 [id number] home regimen Lantus 50 units b i d , Humalog 10 units t i d  with meals and Trulicity once weekly   · Continue Lantus 50 units b i d  while inpatient  · Sliding-scale insulin coverage with Accu-Cheks  · Diabetic diet        VTE Pharmacologic Prophylaxis:   High Risk (Score >/= 5) - Pharmacological DVT Prophylaxis Ordered: heparin  Sequential Compression Devices Ordered  Patient Centered Rounds: I performed bedside rounds with nursing staff today  Discussions with Specialists or Other Care Team Provider:  None    Education and Discussions with Family / Patient: Patient declined call to   Time Spent for Care: 30 minutes  More than 50% of total time spent on counseling and coordination of care as described above  Current Length of Stay: 2 day(s)  Current Patient Status: Inpatient   Certification Statement: The patient will continue to require additional inpatient hospital stay due to CHF requiring cardiac catheterization today  Discharge Plan: Anticipate discharge in 24-48 hrs to home  Code Status: Level 1 - Full Code    Subjective:   Patient reports he is doing well today  Aware of plan for cardiac catheterization    Denies any chest pain or shortness of breath  Objective:     Vitals:   Temp (24hrs), Av 1 °F (36 2 °C), Min:96 8 °F (36 °C), Max:97 9 °F (36 6 °C)    Temp:  [96 8 °F (36 °C)-97 9 °F (36 6 °C)] 96 9 °F (36 1 °C)  HR:  [63-66] 63  Resp:  [16-18] 18  BP: (153-174)/(70-86) 170/71  SpO2:  [94 %-97 %] 95 %  Body mass index is 42 54 kg/m²  Input and Output Summary (last 24 hours): Intake/Output Summary (Last 24 hours) at 2021 1250  Last data filed at 2021 0601  Gross per 24 hour   Intake 100 ml   Output 1800 ml   Net -1700 ml       Physical Exam:   Physical Exam  Vitals reviewed  Constitutional:       General: She is not in acute distress  HENT:      Head: Normocephalic and atraumatic  Eyes:      General: No scleral icterus  Conjunctiva/sclera: Conjunctivae normal    Cardiovascular:      Rate and Rhythm: Normal rate and regular rhythm  Heart sounds: No murmur heard  Pulmonary:      Effort: Pulmonary effort is normal  No respiratory distress  Breath sounds: Normal breath sounds  Abdominal:      General: Bowel sounds are normal  There is no distension  Palpations: Abdomen is soft  Tenderness: There is no abdominal tenderness  Musculoskeletal:      Cervical back: Neck supple  Right lower leg: No edema  Left lower leg: No edema  Skin:     General: Skin is warm and dry  Neurological:      Mental Status: She is alert and oriented to person, place, and time     Psychiatric:         Mood and Affect: Mood normal          Behavior: Behavior normal           Additional Data:     Labs:  Results from last 7 days   Lab Units 21  0750   WBC Thousand/uL 10 00   HEMOGLOBIN g/dL 8 8*   HEMATOCRIT % 26 1*   PLATELETS Thousands/uL 224   NEUTROS PCT % 69*   LYMPHS PCT % 19*   MONOS PCT % 8   EOS PCT % 3     Results from last 7 days   Lab Units 21  0743 21  0447   SODIUM mmol/L 141 142   POTASSIUM mmol/L 3 8 4 0   CHLORIDE mmol/L 106 105   CO2 mmol/L 27 28   BUN mg/dL 53* 54*   CREATININE mg/dL 2 13* 2 22*   ANION GAP mmol/L 8 9   CALCIUM mg/dL 8 4 8 9   ALBUMIN g/dL  --  3 6   TOTAL BILIRUBIN mg/dL  --  0 40   ALK PHOS U/L  --  101   ALT U/L  --  15   AST U/L  --  28   GLUCOSE RANDOM mg/dL 138 107*         Results from last 7 days   Lab Units 08/31/21  1114 08/31/21  0726 08/30/21  2053 08/30/21  1631 08/30/21  1103 08/30/21  0720 08/29/21  2115 08/29/21  1605 08/29/21  1056 08/29/21  0606 08/28/21  2056 08/28/21  1608   POC GLUCOSE mg/dl 122 129 236* 170* 132 102 184* 132 128 134 208* 161*               Lines/Drains:  Invasive Devices     Peripheral Intravenous Line            Peripheral IV 08/29/21 Dorsal (posterior); Right Forearm 1 day          Drain            Suprapubic Catheter Non-latex 16 Fr  <1 day                      Imaging: No pertinent imaging reviewed      Recent Cultures (last 7 days):         Last 24 Hours Medication List:   Current Facility-Administered Medications   Medication Dose Route Frequency Provider Last Rate    acetaminophen  650 mg Oral Q6H PRN Anupam SETH Vigil      acetylcysteine  600 mg Oral BID Jose Reyes DO      amLODIPine  5 mg Oral Daily Sangeetha Maldonado PA-C      aspirin  81 mg Oral Daily Sangeetha Maldonado PA-C      atorvastatin  40 mg Oral After [facility] SETH Monsalve      carvedilol  25 mg Oral BID With Meals Sangeetha Maldonado PA-C      cholecalciferol  2,000 Units Oral Daily Sangeetha Maldonado PA-C      clopidogrel  75 mg Oral Daily Sangeetha Maldonado PA-C      dicyclomine  10 mg Oral 4x Daily (AC & HS) Sangeetha Maldonado PA-C      docusate sodium  100 mg Oral BID Sangeetha Maldonado PA-C      ferrous sulfate  325 mg Oral Every Other Day Sangeetha Maldonado PA-C      heparin (porcine)  5,000 Units Subcutaneous [place], [place]      HYDROcodone-acetaminophen  1 tablet Oral BID PRN Sangeetha Maldonado PA-C      insulin glargine  50 Units Subcutaneous Q12H [address] Sangeetha Maldonado PA-C      insulin lispro 2-12 Units Subcutaneous TID AC Laura Ross PA-C      isosorbide mononitrate  30 mg Oral Daily Laura Ross PA-C      levothyroxine  50 mcg Oral Early Morning Laura Ross PA-C      nitroglycerin  0 4 mg Sublingual Q5 Min PRN Laura Ross PA-C      nystatin   Topical BID Laura Ross PA-C      ondansetron  4 mg Oral Q6H PRN Laura Ross PA-C      promethazine  25 mg Intravenous Q12H PRN Laura Ross PA-C      saccharomyces boulardii  250 mg Oral BID Laura Ross PA-C      sodium chloride  50 mL/hr Intravenous Continuous Pedro Linda MD 50 mL/hr (08/31/21 0849)        Today, Patient Was Seen By: Fátima Mcallister PA-C    **Please Note: This note may have been constructed using a voice recognition system  **

## 2021-08-31 NOTE — PROGRESS NOTES
Progress Note - Urology  Norbert Overton 1960, 64 y o  female MRN: [id number]    Unit/Bed#: E4 -01 Encounter: [id number]    Urinary tract infection associated with cystostomy catheter ([facility] [address] )  Assessment & Plan  · Insulin-dependent type 2 diabetic with polyneuropathy and neurogenic bladder managed with suprapubic tube  · 16 Fr SPT exchanged yesterday by Lina Bernal PA-C   · 1200 cc output overnight  Currently patent draining clear yellow urine  · Creatinine trending down from 2 6 yesterday to 2 1 today  · US kidney and bladder ordered yesterday by Dr Ely Joseph for further evaluation of upper tract secondary to bump in creatinine  · Will follow up with results  · Continue to monitor  · Nephrology following  · Patient due for cardiac catheterization today  · Postponed from yesterday secondary to bump in creatinine  · Continue IV Abx per primary team for positive urine culture 8/23  Subjective:   HPI:  Patient doing well this morning  She denies any flank or abdominal pain  She denies any issues with her suprapubic tube following exchange yesterday  Is currently patent, draining clear yellow urine  She denies any fevers or chills overnight  She denies any nausea or vomiting  Review of Systems   Constitutional: Negative for chills and fever  HENT: Negative  Respiratory: Negative  Cardiovascular: Negative  Gastrointestinal: Negative for abdominal pain, nausea and vomiting  Genitourinary: Negative for decreased urine volume, flank pain and hematuria  Musculoskeletal: Negative  Skin: Negative  Neurological: Negative  Psychiatric/Behavioral:        "No pain, just nerves"       Objective:  Nursing Rounds:  No issues reported overnight  Vitals: Blood pressure (!) 174/76, pulse 64, temperature (!) 96 9 °F (36 1 °C), temperature source Temporal, resp  rate 16, height 5' 8" (1 727 m), weight 127 kg (279 lb 12 2 oz), SpO2 97 %, not currently breastfeeding  ,Body mass index is 42 54 kg/m²  Physical Exam  Vitals reviewed  Constitutional:       General: She is not in acute distress  Appearance: Normal appearance  She is obese  She is not ill-appearing, toxic-appearing or diaphoretic  Comments: Sitting up, resting comfortably in her bed  HENT:      Head: Normocephalic and atraumatic  Eyes:      Conjunctiva/sclera: Conjunctivae normal    Cardiovascular:      Rate and Rhythm: Normal rate and regular rhythm  Heart sounds: Normal heart sounds  Pulmonary:      Effort: Pulmonary effort is normal  No respiratory distress  Breath sounds: Normal breath sounds  Abdominal:      General: Bowel sounds are normal  There is no distension  Palpations: Abdomen is soft  Tenderness: There is no abdominal tenderness  There is no right CVA tenderness, left CVA tenderness, guarding or rebound  Comments: SPT patent, draining clear yellow urine  Musculoskeletal:      Cervical back: Normal range of motion  Skin:     General: Skin is warm and dry  Neurological:      General: No focal deficit present  Mental Status: She is alert and oriented to person, place, and time  Psychiatric:         Mood and Affect: Mood normal          Behavior: Behavior normal          Thought Content: Thought content normal          Judgment: Judgment normal          Imaging:  US kidney and bladder ordered by Dr Kasie Boothe       Labs:  Recent Labs     08/29/21  0750   WBC 10 00       Recent Labs     08/29/21  0750   HGB 8 8*     Recent Labs     08/29/21  0750   HCT 26 1*     Recent Labs     08/29/21  0503 08/30/21  0516 08/31/21  0743   CREATININE 2 44* 2 60* 2 13*     Urine Culture 8/23/21 >100,000 cfu/ml Klebsiella oxytocaAbnormal        10,000-19,000 cfu/ml Corynebacterium striatum groupAbnormal        10,000-19,000 cfu/ml Corynebacterium amycolatumAbnormal            History:    Past Medical History:   Diagnosis Date    Abnormal liver function     Anemia     Anxiety     Arthritis     Chronic kidney disease     stage 3    Chronic narcotic dependence (HCC)     Chronic pain disorder     lower back, hands , neck and knees    Coronary artery disease     Depression     Diabetes mellitus ([facility] [address] [facility]     GERD (gastroesophageal reflux disease)     no meds at present    Heart murmur     murmur    Hyperlipidemia     Hypertension     Neurogenic bladder     Open toe wound 2020    right big toe open calus but is dry at present    Renal disorder     Shortness of breath     exertion    Sleep apnea     doesn't use cpap    Suprapubic catheter ([facility] )      Social History     Socioeconomic History    Marital status:      Spouse name: None    Number of children: None    Years of education: None    Highest education level: None   Occupational History    None   Tobacco Use    Smoking status: Former Smoker     Packs/day: 1 00     Years: 35 00     Pack years: 35 00     Types: Cigarettes     Quit date:      Years since quittin 6    Smokeless tobacco: Never Used   Vaping Use    Vaping Use: Never used   Substance and Sexual Activity    Alcohol use: Not Currently    Drug use: Never    Sexual activity: Not Currently   Other Topics Concern    None   Social History Narrative    None     Social Determinants of Health     Financial Resource Strain:     Difficulty of Paying Living Expenses:    Food Insecurity:     Worried About Running Out of Food in the Last Year:     Ran Out of Food in the Last Year:    Transportation Needs:     Lack of Transportation (Medical):      Lack of Transportation (Non-Medical):    Physical Activity:     Days of Exercise per Week:     Minutes of Exercise per Session:    Stress:     Feeling of Stress :    Social Connections:     Frequency of Communication with Friends and Family:     Frequency of Social Gatherings with Friends and Family:     Attends Jew Services:     Active Member of Clubs or Organizations:     Attends Club or Organization Meetings:     Marital Status:    Intimate Partner Violence:     Fear of Current or Ex-Partner:     Emotionally Abused:     Physically Abused:     Sexually Abused:      Past Surgical History:   Procedure Laterality Date    AMPUTATION      ANGIOPLASTY  2017    5    BREAST EXCISIONAL BIOPSY Left     BREAST SURGERY      CARDIAC CATHETERIZATION      CARPAL TUNNEL RELEASE Bilateral     CERVICAL FUSION      HYSTERECTOMY  2008    IR SUPRAPUBIC CATHETER PLACEMENT  6/15/2021    KNEE SURGERY      OOPHORECTOMY  2008    NC EXC SKIN BENIG 3 1-4 CM REMAINDR BODY N/A 12/21/2020    Procedure: EXCISION SEBACEOUS CYST X 5 SCALP;  Surgeon: Rafaela Keane MD;  Location[facility];  Service: General    TOE AMPUTATION Left     TRIGGER FINGER RELEASE Right     4th Finger     Family History   Problem Relation Age of Onset    Stroke Father     Heart disease Father     No Known Problems Mother     No Known Problems Sister     No Known Problems Daughter     No Known Problems Maternal Grandmother     No Known Problems Maternal Grandfather     No Known Problems Paternal Grandmother     No Known Problems Paternal Grandfather     No Known Problems Maternal Aunt     No Known Problems Maternal Aunt     No Known Problems Maternal Aunt     No Known Problems Maternal Aunt     No Known Problems Maternal Aunt     No Known Problems Maternal Aunt     No Known Problems Paternal Aunt        Nanci Ashby  Date: 8/31/2021 Time: 10:28 AM

## 2021-08-31 NOTE — ASSESSMENT & PLAN NOTE
· History of suprapubic catheter with neurogenic bladder   · Was found to have positive UA at time of admission  · Received IV ceftriaxone, will discontinue as patient has completed 7 day course  · Urology performed catheter exchanged yesterday

## 2021-09-01 VITALS
WEIGHT: 279.76 LBS | SYSTOLIC BLOOD PRESSURE: 129 MMHG | HEIGHT: 68 IN | TEMPERATURE: 97.9 F | BODY MASS INDEX: 42.4 KG/M2 | DIASTOLIC BLOOD PRESSURE: 60 MMHG | HEART RATE: 59 BPM | RESPIRATION RATE: 18 BRPM | OXYGEN SATURATION: 95 %

## 2021-09-01 LAB
ANION GAP SERPL CALCULATED.3IONS-SCNC: 10 MMOL/L (ref 4–13)
BASOPHILS # BLD AUTO: 0.05 THOUSANDS/ΜL (ref 0–0.1)
BASOPHILS NFR BLD AUTO: 1 % (ref 0–1)
BUN SERPL-MCNC: 45 MG/DL (ref 5–25)
CALCIUM SERPL-MCNC: 8.4 MG/DL (ref 8.3–10.1)
CHLORIDE SERPL-SCNC: 107 MMOL/L (ref 100–108)
CO2 SERPL-SCNC: 26 MMOL/L (ref 21–32)
CREAT SERPL-MCNC: 1.82 MG/DL (ref 0.6–1.3)
EOSINOPHIL # BLD AUTO: 0.31 THOUSAND/ΜL (ref 0–0.61)
EOSINOPHIL NFR BLD AUTO: 3 % (ref 0–6)
ERYTHROCYTE [DISTWIDTH] IN BLOOD BY AUTOMATED COUNT: 15.9 % (ref 11.6–15.1)
GFR SERPL CREATININE-BSD FRML MDRD: 30 ML/MIN/1.73SQ M
GLUCOSE SERPL-MCNC: 81 MG/DL (ref 65–140)
GLUCOSE SERPL-MCNC: 93 MG/DL (ref 65–140)
HCT VFR BLD AUTO: 28 % (ref 34.8–46.1)
HGB BLD-MCNC: 9 G/DL (ref 11.5–15.4)
IMM GRANULOCYTES # BLD AUTO: 0.03 THOUSAND/UL (ref 0–0.2)
IMM GRANULOCYTES NFR BLD AUTO: 0 % (ref 0–2)
LYMPHOCYTES # BLD AUTO: 1.83 THOUSANDS/ΜL (ref 0.6–4.47)
LYMPHOCYTES NFR BLD AUTO: 20 % (ref 14–44)
MCH RBC QN AUTO: 31 PG (ref 26.8–34.3)
MCHC RBC AUTO-ENTMCNC: 32.1 G/DL (ref 31.4–37.4)
MCV RBC AUTO: 97 FL (ref 82–98)
MONOCYTES # BLD AUTO: 0.71 THOUSAND/ΜL (ref 0.17–1.22)
MONOCYTES NFR BLD AUTO: 8 % (ref 4–12)
NEUTROPHILS # BLD AUTO: 6.09 THOUSANDS/ΜL (ref 1.85–7.62)
NEUTS SEG NFR BLD AUTO: 68 % (ref 43–75)
NRBC BLD AUTO-RTO: 0 /100 WBCS
PLATELET # BLD AUTO: 217 THOUSANDS/UL (ref 149–390)
PMV BLD AUTO: 9.5 FL (ref 8.9–12.7)
POTASSIUM SERPL-SCNC: 3.7 MMOL/L (ref 3.5–5.3)
RBC # BLD AUTO: 2.9 MILLION/UL (ref 3.81–5.12)
SODIUM SERPL-SCNC: 143 MMOL/L (ref 136–145)
WBC # BLD AUTO: 9.02 THOUSAND/UL (ref 4.31–10.16)

## 2021-09-01 PROCEDURE — 85025 COMPLETE CBC W/AUTO DIFF WBC: CPT | Performed by: PHYSICIAN ASSISTANT

## 2021-09-01 PROCEDURE — 99232 SBSQ HOSP IP/OBS MODERATE 35: CPT | Performed by: INTERNAL MEDICINE

## 2021-09-01 PROCEDURE — 97163 PT EVAL HIGH COMPLEX 45 MIN: CPT

## 2021-09-01 PROCEDURE — 99239 HOSP IP/OBS DSCHRG MGMT >30: CPT | Performed by: INTERNAL MEDICINE

## 2021-09-01 PROCEDURE — 97166 OT EVAL MOD COMPLEX 45 MIN: CPT

## 2021-09-01 PROCEDURE — 82948 REAGENT STRIP/BLOOD GLUCOSE: CPT

## 2021-09-01 PROCEDURE — 80048 BASIC METABOLIC PNL TOTAL CA: CPT | Performed by: INTERNAL MEDICINE

## 2021-09-01 RX ORDER — TORSEMIDE 20 MG/1
20 TABLET ORAL DAILY PRN
Qty: 60 TABLET | Refills: 0 | Status: SHIPPED | OUTPATIENT
Start: 2021-09-01 | End: 2022-01-06

## 2021-09-01 RX ORDER — TORSEMIDE 20 MG/1
20 TABLET ORAL 2 TIMES DAILY
Qty: 60 TABLET | Refills: 0 | Status: SHIPPED | OUTPATIENT
Start: 2021-09-01 | End: 2021-09-01 | Stop reason: SDUPTHER

## 2021-09-01 RX ORDER — FERROUS SULFATE 325(65) MG
325 TABLET ORAL EVERY OTHER DAY
Qty: 30 TABLET | Refills: 0 | Status: SHIPPED | OUTPATIENT
Start: 2021-09-01 | End: 2021-12-14 | Stop reason: SDUPTHER

## 2021-09-01 RX ADMIN — CARVEDILOL 25 MG: 25 TABLET, FILM COATED ORAL at 09:05

## 2021-09-01 RX ADMIN — ASPIRIN 81 MG: 81 TABLET ORAL at 09:05

## 2021-09-01 RX ADMIN — INSULIN GLARGINE 50 UNITS: 100 INJECTION, SOLUTION SUBCUTANEOUS at 09:13

## 2021-09-01 RX ADMIN — DOCUSATE SODIUM 100 MG: 100 CAPSULE, LIQUID FILLED ORAL at 09:05

## 2021-09-01 RX ADMIN — LEVOTHYROXINE SODIUM 50 MCG: 50 TABLET ORAL at 06:27

## 2021-09-01 RX ADMIN — HEPARIN SODIUM 5000 UNITS: 5000 INJECTION INTRAVENOUS; SUBCUTANEOUS at 06:27

## 2021-09-01 RX ADMIN — CLOPIDOGREL BISULFATE 75 MG: 75 TABLET ORAL at 09:06

## 2021-09-01 RX ADMIN — AMLODIPINE BESYLATE 5 MG: 5 TABLET ORAL at 09:06

## 2021-09-01 RX ADMIN — Medication 250 MG: at 09:05

## 2021-09-01 RX ADMIN — NYSTATIN: 100000 POWDER TOPICAL at 09:06

## 2021-09-01 RX ADMIN — DICYCLOMINE HYDROCHLORIDE 10 MG: 10 CAPSULE ORAL at 06:27

## 2021-09-01 RX ADMIN — ISOSORBIDE MONONITRATE 30 MG: 30 TABLET, EXTENDED RELEASE ORAL at 09:05

## 2021-09-01 RX ADMIN — ACETYLCYSTEINE 600 MG: 200 SOLUTION ORAL; RESPIRATORY (INHALATION) at 09:06

## 2021-09-01 RX ADMIN — Medication 2000 UNITS: at 09:05

## 2021-09-01 NOTE — ASSESSMENT & PLAN NOTE
· History of suprapubic catheter with neurogenic bladder   · Was found to have positive UA at time of admission  · Received IV ceftriaxone, completed 7 days of antibiotics  · Urology performed catheter exchanged while hospitalized

## 2021-09-01 NOTE — ASSESSMENT & PLAN NOTE
· Hemoglobin 8 8 ; at baseline   · Likely component of CKD stage 4  · Continue iron supplementation   · Fecal occult blood negative

## 2021-09-01 NOTE — PROGRESS NOTES
Progress Note - Cardiology   Marcela Edwards 64 y o  female MRN: [id number]  Unit/Bed#: E4 -01 Encounter: [id number]    Assessment:  1  Acute combined systolic and diastolic congestive heart failure, resolved  2  Chronic kidney disease, stage IIIB-stage IV  Creatinine improved following hydration post cardiac catheterization from 2 13-> 1 82  3  Coronary artery disease, status post cardiac catheterization demonstrating patent stents as below  4  Diabetes mellitus, type 2  5  Hyperlipidemia  6  Neurogenic bladder with suprapubic catheter   7  Hypertension    Plan:  1  Patient with congestive heart failure, systolic and diastolic, appears euvolemic today  2  No significant obstructive coronary artery disease, status post 4-5 stents  3  Chronic kidney disease, stable  4  Hypertension well controlled  5  From a cardiac status, patient may be discharged today  Will send a note to our office to arrange follow-up  Only question on discharge which is not cardiac, patient has been hospitalized for number of days and has been inactive, would she benefit by physical therapy at [address] [place] [address] for rehab [facility] question    6  Will sign off, call if further cardiac help is needed  Interval history:  Patient underwent cardiac catheterization yesterday  Stents in the proximal LAD, mid LAD, distal LAD and circumflex artery were widely patent  The right coronary artery was non dominant in distribution in appeared to be a chronic total obstruction  Patient feels good and would like to go home today      PROBLEM LIST:    Patient Active Problem List    Diagnosis Date Noted    New onset of congestive heart failure ([address] ) 08/24/2021    Hypocalcemia 08/24/2021    Prolonged QT interval 08/24/2021    Urinary tract infection associated with cystostomy catheter ([address] ) 08/23/2021    Neurogenic bladder 08/23/2021    ARF (acute renal failure) ([address] ) 06/23/2021    Morbid obesity with BMI of 45 0-49 9, adult [facility]) 06/15/2021    History of heart artery stent 06/15/2021    CKD (chronic kidney disease) stage 4, GFR 15-29 ml/min ([facility]) 06/04/2021    Memory impairment 05/26/2021    Chronic bronchitis ([place] [address] ) 05/25/2021    Severe episode of recurrent major depressive disorder, without psychotic features ([place] [address] ) 05/25/2021    Skin ulcer of hand, limited to breakdown of skin ([place] [address] ) 02/25/2021    HTN (hypertension) 12/21/2020    Diabetic ulcer of toe of right foot associated with type 2 diabetes mellitus, with muscle involvement without evidence of necrosis ([place] [address] ) 11/25/2020    Head lump 11/11/2020    Need for follow-up by  11/11/2020    Normochromic normocytic anemia 09/09/2020    CKD (chronic kidney disease) stage 3, GFR 30-59 ml/min ([facility]) 09/09/2020    Abnormal LFTs 09/09/2020    S/P cervical spinal fusion 09/09/2020    Major depressive disorder 09/02/2020    Type 2 diabetes mellitus with diabetic polyneuropathy, with long-term current use of insulin ([place] [address] ) 09/02/2020    Anxiety 09/02/2020    CAD (coronary artery disease) 09/02/2020    Osteoarthritis of left knee 09/02/2020    Healthcare maintenance 09/02/2020    Cellulitis of finger of right hand 08/26/2020       Vitals: /60 (BP Location: Left arm)   Pulse 59   Temp 97 9 °F (36 6 °C) (Tympanic)   Resp 18   Ht 5' 8" (1 727 m)   Wt 127 kg (279 lb 12 2 oz)   SpO2 95%   BMI 42 54 kg/m²   PREVIOUS WEIGHTS:   Wt Readings from Last 5 Encounters:   09/01/21 127 kg (279 lb 12 2 oz)   08/27/21 133 kg (292 lb 15 9 oz)   08/03/21 132 kg (290 lb)   08/03/21 132 kg (291 lb)   07/13/21 130 kg (286 lb 3 2 oz)        Labs/Data:        Results from last 7 days   Lab Units 09/01/21  0623 08/29/21  0750 08/28/21  0447   WBC Thousand/uL 9 02 10 00 9 60   HEMOGLOBIN g/dL 9 0* 8 8* 9 2*   HEMATOCRIT % 28 0* 26 1* 28 3*   MCV fL 97 95 98   MCH pg 31 0 32 0 31 7   MCHC g/dL 32 1 33 7 32 4   PLATELETS Thousands/uL 217 224 219     Results from last 7 days Lab Units 09/01/21  0623 08/31/21  0743 08/30/21  0516   EGFR ml/min/1 73sq m 30 25 20   SODIUM mmol/L 143 141 141   POTASSIUM mmol/L 3 7 3 8 3 5   CHLORIDE mmol/L 107 106 105   CO2 mmol/L 26 27 26   BUN mg/dL 45* 53* 57*   CREATININE mg/dL 1 82* 2 13* 2 60*     Results from last 7 days   Lab Units 09/01/21  0623 08/31/21  0743 08/30/21  0516 08/29/21  0503 08/28/21  0447 08/27/21  0454   CALCIUM mg/dL 8 4 8 4 8 2* 8 6 8 9 9 0   AST U/L  --   --   --   --  28 27   ALT U/L  --   --   --   --  15 16   ALK PHOS U/L  --   --   --   --  101 98   MAGNESIUM mg/dL  --   --   --  1 7 1 7 1 7         Lab Results   Component Value Date    NTBNP 985 (H) 08/23/2021     Lab Results   Component Value Date    CHOLESTEROL 152 08/03/2021    TRIG 180 (H) 08/03/2021    HDL 30 (L) 08/03/2021    LDLCALC 86 08/03/2021    HGBA1C 7 1 (H) 08/03/2021         Current Facility-Administered Medications:     acetaminophen (TYLENOL) tablet 650 mg, 650 mg, Oral, Q6H PRN, Joanna Ceballos PA-C, 650 mg at 08/30/21 2017    acetylcysteine (MUCOMYST) 200 mg/mL oral solution 600 mg, 600 mg, Oral, BID, Jose Reyes DO, 600 mg at 08/31/21 1714    amLODIPine (NORVASC) tablet 5 mg, 5 mg, Oral, Daily, Laura Ross PA-C, 5 mg at 09/01/21 0906    aspirin (ECOTRIN LOW STRENGTH) EC tablet 81 mg, 81 mg, Oral, Daily, Laura Ross PA-C, 81 mg at 09/01/21 0905    atorvastatin (LIPITOR) tablet 40 mg, 40 mg, Oral, After Dinner, Laura Ross PA-C, 40 mg at 08/31/21 1714    carvedilol (COREG) tablet 25 mg, 25 mg, Oral, BID With Meals, Laura Ross PA-C, 25 mg at 09/01/21 0905    cholecalciferol (VITAMIN D3) tablet 2,000 Units, 2,000 Units, Oral, Daily, Laura Ross PA-C, 2,000 Units at 09/01/21 0905    clopidogrel (PLAVIX) tablet 75 mg, 75 mg, Oral, Daily, Laura Ross PA-C, 75 mg at 09/01/21 0906    dicyclomine (BENTYL) capsule 10 mg, 10 mg, Oral, 4x Daily (AC & HS), Laura Ross PA-C, 10 mg at 09/01/21 9306    docusate sodium (COLACE) capsule 100 mg, 100 mg, Oral, BID, Tita Flatness, PA-C, 100 mg at 09/01/21 7104    ferrous sulfate tablet 325 mg, 325 mg, Oral, Every Other Day, Tita Flatness, PA-C, 325 mg at 08/30/21 1146    heparin (porcine) subcutaneous injection 5,000 Units, 5,000 Units, Subcutaneous, Q8H [facility], Tita Flatness, PA-C, 5,000 Units at 09/01/21 4060    HYDROcodone-acetaminophen (NORCO) 5-325 mg per tablet 1 tablet, 1 tablet, Oral, BID PRN, Tita Flatness, PA-C, 1 tablet at 08/31/21 0844    insulin glargine (LANTUS) subcutaneous injection 50 Units 0 5 mL, 50 Units, Subcutaneous, Q12H [facility], Tita Flatness, PA-C, 50 Units at 08/31/21 2150    insulin lispro (HumaLOG) 100 units/mL subcutaneous injection 2-12 Units, 2-12 Units, Subcutaneous, TID AC, 2 Units at 08/30/21 1633 **AND** Fingerstick Glucose (POCT), , , 4x Daily AC and at bedtime, Tita Flatness, PA-C    isosorbide mononitrate (IMDUR) 24 hr tablet 30 mg, 30 mg, Oral, Daily, Tita Flatness, PA-C, 30 mg at 09/01/21 0905    levothyroxine tablet 50 mcg, 50 mcg, Oral, Early Morning, Tita Flatness, PA-C, 50 mcg at 09/01/21 9231    nitroglycerin (NITROSTAT) SL tablet 0 4 mg, 0 4 mg, Sublingual, Q5 Min PRN, Tita Flatness, PA-C    nystatin (MYCOSTATIN) powder, , Topical, BID, Tita Flatness, PA-C, Given at 09/01/21 0906    ondansetron (ZOFRAN-ODT) dispersible tablet 4 mg, 4 mg, Oral, Q6H PRN, Tita Flatness, PA-C    promethazine (PHENERGAN) injection 25 mg, 25 mg, Intravenous, Q12H PRN, Tita Flatness, PA-C    saccharomyces boulardii (FLORASTOR) capsule 250 mg, 250 mg, Oral, BID, LANRE Lang-C, 250 mg at 09/01/21 0781  Allergies   Allergen Reactions    Codeine      Other reaction(s): Nausea and Vomiting    Latex Itching         Intake/Output Summary (Last 24 hours) at 9/1/2021 0906  Last data filed at 9/1/2021 0630  Gross per 24 hour   Intake 940 83 ml   Output 1875 ml   Net -934 17 ml       Invasive Devices     Peripheral Intravenous Line            Peripheral IV 08/29/21 Dorsal (posterior); Right Forearm 2 days          Drain            Suprapubic Catheter Non-latex 16 Fr  1 day                Review of Systems   Respiratory: Negative for cough, choking, chest tightness, shortness of breath and wheezing  Cardiovascular: Negative for chest pain, palpitations and leg swelling  Musculoskeletal: Negative for gait problem  Skin: Negative for rash  Neurological: Negative for dizziness, tremors, syncope, weakness, light-headedness, numbness and headaches  Psychiatric/Behavioral: Negative for agitation and behavioral problems  The patient is not hyperactive  Physical Exam  Constitutional:       General: She is not in acute distress  Appearance: She is well-developed  She is obese  HENT:      Head: Normocephalic and atraumatic  Neck:      Thyroid: No thyromegaly  Vascular: No carotid bruit or JVD  Trachea: No tracheal deviation  Cardiovascular:      Rate and Rhythm: Normal rate and regular rhythm  Pulses: Normal pulses  Heart sounds: Normal heart sounds  No murmur heard  No friction rub  No gallop  Pulmonary:      Effort: Pulmonary effort is normal  No respiratory distress  Breath sounds: Normal breath sounds  No wheezing, rhonchi or rales  Chest:      Chest wall: No tenderness  Abdominal:      General: Bowel sounds are normal  There is no distension  Palpations: Abdomen is soft  Tenderness: There is no abdominal tenderness  Genitourinary:     Comments: Suprapubic catheter for neurogenic bladder  Musculoskeletal:         General: Normal range of motion  Cervical back: Normal range of motion and neck supple  Right lower leg: No edema  Left lower leg: No edema  Skin:     General: Skin is warm and dry  Neurological:      General: No focal deficit present  Mental Status: She is alert and oriented to person, place, and time        Gait: Gait normal  Psychiatric:         Mood and Affect: Mood normal          Behavior: Behavior normal          Thought Content: Thought content normal          Judgment: Judgment normal          --------------------------------------------------------------------------------  CATH:  Results for orders placed during the hospital encounter of 21    Cardiac catheterization    Narrative  [address] [place] [address]  [place], [address]  [phone]    [facility]    Invasive Cardiovascular Lab Complete Report    Patient: Ambika Duval  MR number: [id number]  Account number: [de-identified]  Study date: 2021  Gender: Female  : 1960  Height: 68 1 in  Weight: 279 4 lb  BSA: 2 36 mï¾²    Allergies: CODEINE, LATEX    Diagnostic Cardiologist:  Brenna Abraham MD  Primary Physician:  Roderick Richard    SUMMARY    CORONARY CIRCULATION:  Proximal RCA: There was a 100 % stenosis  This lesion is a chronic total occlusion  INDICATIONS:  --  Possible CAD: myocardial infarction without ST elevation (NSTEMI)  --  Congestive heart failure with ischemic cardiomyopathy  PROCEDURES PERFORMED    --  Left heart catheterization without ventriculogram   --  Left coronary angiography  --  Right coronary angiography  --  Inpatient  --  Mod Sedation Same Physician Initial 15min  --  Coronary Catheterization (w/ LHC)  PROCEDURE: The risks and alternatives of the procedures and conscious sedation were explained to the patient and informed consent was obtained  The patient was brought to the cath lab and placed on the table  The planned puncture sites  were prepped and draped in the usual sterile fashion  --  Right radial artery access  After performing an Erasto's test to verify adequate ulnar artery supply to the hand, the radial site was prepped  The puncture site was infiltrated with local anesthetic   The vessel was accessed using the  modified Seldinger technique, a wire was advanced into the vessel, and a sheath was advanced over the wire into the vessel  --  Left heart catheterization without ventriculogram  A catheter was advanced over a guidewire into the ascending aorta  After recording ascending aortic pressure, the catheter was advanced across the aortic valve and left ventricular  pressure was recorded  The catheter was pulled back across the aortic valve and into the ascending aorta and pullback pressures were obtained  --  Left coronary artery angiography  A catheter was advanced over a guidewire into the aorta and positioned in the left coronary artery ostium under fluoroscopic guidance  Angiography was performed  --  Right coronary artery angiography  A catheter was advanced over a guidewire into the aorta and positioned in the right coronary artery ostium under fluoroscopic guidance  Angiography was performed  --  Inpatient  --  Mod Sedation Same Physician Initial 15min  --  Coronary Catheterization (w/ [facility])  PROCEDURE COMPLETION: The patient tolerated the procedure well and was discharged from the cath lab  TIMING: Test started at 14:33  Test concluded at 14:45  HEMOSTASIS: The sheath was removed  The site was compressed with a Hemoband  device  Hemostasis was obtained  MEDICATIONS GIVEN: Fentanyl (1OOmcg/2 ml), 50 mcg, IV, at 14:30  Versed (2mg/2ml), 2 mg, IV, at 14:30  Zofran (Ondansetron), 4 mg, IV, at 14:34  1% Lidocaine, 2 ml, subcutaneously, at 14:35  Nitroglycerin  (200mcg/ml), 200 mcg, at 14:37  Verapamil (5mg/2ml), 5 mg, IV, at 14:37  Heparin 1000 units/ml, 4,000 units, IV, at 14:37  CONTRAST GIVEN: 30 ml Visipaque (320mg I /ml)  RADIATION EXPOSURE: Fluoroscopy time: 1 32 min  HEMODYNAMICS: Hemodynamic assessment demonstrated normal LVEDP  12-14    CORONARY VESSELS:   --  The coronary circulation is left dominant  --  Left main: The vessel was medium to large sized  Angiography showed mild atherosclerosis  --  LAD: The vessel was large sized   Angiography showed no evidence of disease  --  Proximal LAD: There was a discrete 30 % stenosis at the site of a prior stent  --  Mid LAD: There was a 0 % stenosis at the site of a prior stent  --  Distal LAD: There was a 0 % stenosis at the site of a prior stent  --  Proximal circumflex: There was a 0 % stenosis at the site of a prior stent  --  RCA: The vessel was small (non-dominant)  --  Proximal RCA: There was a 100 % stenosis  This lesion is a chronic total occlusion  IMPRESSIONS:  Type 2 MI  There is significant single vessel coronary artery disease  RECOMMENDATIONS  The patient should continue with the present medications  DISPOSITION:  The patient left the catheterization laboratory in stable condition  Prepared and signed by    Marcella Calderon MD  Signed 2021 14:55:54    Study diagram    Angiographic findings  Native coronary lesions:  ï¾·Proximal LAD: Lesion 1: discrete, 30 % stenosis, site of prior stent  ï¾·Mid LAD: Lesion 1: 0 % stenosis, site of prior stent  ï¾·Distal LAD: Lesion 1: 0 % stenosis, site of prior stent  ï¾·Proximal circumflex: Lesion 1: 0 % stenosis, site of prior stent  ï¾·Proximal RCA: Lesion 1: 100 % stenosis      Hemodynamic tables    Pressures:  Baseline  Pressures:  - HR: 66  Pressures:  - Rhythm:  Pressures:  -- Aortic Pressure (S/D/M): 153/61/76  Pressures:  -- Left Ventricle (s/edp): 139/12/--  Pressures:  -- Left Ventricle (s/edp): 139/19/--    Outputs:  Baseline  Outputs:  -- CALCULATIONS: [age]  Outputs:  -- CALCULATIONS: Body Surface Area: 2 36  Outputs:  -- CALCULATIONS: Height in cm: 173 00  Outputs:  -- CALCULATIONS: Sex: Female  Outputs:  -- CALCULATIONS: Weight in k 00    --------------------------------------------------------------------------------  ECHO:   Results for orders placed during the hospital encounter of 21    Echo complete with contrast if indicated    Narrative  [address] [facility]    Transthoracic Echocardiogram  2D, M-mode, Doppler, and Color Doppler    Study date:  24-Aug-2021    Patient: Gabrielle Caruso  MR number: [id number]  Account number: [de-identified]  : 1960  Age: 64 years  Gender: Female  Status: Inpatient  [facility]: [facility]  Height: 68 in  Weight: 312 4 lb  BP: 149/ 79 mmHg    Indications: Heart failure    Diagnoses: I50 9 - Heart failure, unspecified    Sonographer:  CASANDRA Ruiz  Interpreting Physician:  NICOLE Georges  Primary Physician:  Celina Camp MD  Referring Physician:  CHERELLE Pryor  Group:  [facility]    SUMMARY    LEFT VENTRICLE:  Systolic function was mildly reduced by visual assessment  Ejection fraction was estimated to be 45 %  There were regional wall motion abnormalities that suggest coronary artery disease  There was severe hypokinesis of the basal-mid anteroseptal and basal-mid inferoseptal wall(s)  There was moderate hypokinesis of the basal-mid inferior wall(s)  Wall thickness was mildly increased  There was mild concentric hypertrophy  Features were consistent with grade 2 diastolic dysfunction  Doppler parameters were consistent with high ventricular filling pressure  E/E' was > 19    VENTRICULAR SEPTUM:  There was dyssynergic motion  These changes are consistent with LBBB  MITRAL VALVE:  There was mild "progressive" mitral stenosis  Mean gradient of 5 mmHg at 73 bpm  MVA by Doppler continuity equation is 2 15 cm2  TRICUSPID VALVE:  There was mild regurgitation  PULMONIC VALVE:  There was trace regurgitation  PERICARDIUM:  A trace pericardial effusion was identified  HISTORY: PRIOR HISTORY: CAD, CKD Risk factors: hypertension and diabetes  PROCEDURE: The study was performed in the [place] [address]  This was a routine study  The transthoracic approach was used   The study included complete 2D imaging, M-mode, complete spectral Doppler, and color Doppler  The heart rate was 76  bpm, at the start of the study  Images were obtained from the parasternal, apical, subcostal, and suprasternal notch acoustic windows  Image quality was adequate  LEFT VENTRICLE: Size was normal  Systolic function was mildly reduced by visual assessment  Ejection fraction was estimated to be 45 %  There were regional wall motion abnormalities that suggest coronary artery disease  There was severe hypokinesis of the basal-mid anteroseptal and basal-mid inferoseptal wall(s)  There was moderate hypokinesis of the basal-mid inferior wall(s)  Wall thickness was mildly increased  There was mild concentric hypertrophy  DOPPLER: Features were consistent with grade 2 diastolic dysfunction  Doppler parameters were consistent with high ventricular filling pressure  E/E' was > 19    VENTRICULAR SEPTUM: There was dyssynergic motion  These changes are consistent with LBBB  RIGHT VENTRICLE: The size was normal  Systolic function was normal  Wall thickness was normal     LEFT ATRIUM: Size was within the limits of normal when indexed for body surface area  LA volume index 31 ml/m2  RIGHT ATRIUM: Size was normal     MITRAL VALVE: Valve structure was normal  There was mild thickening  There was normal leaflet separation  There was mildly restricted mobility of the anterior leaflet  DOPPLER: The transmitral velocity was within the normal range  There  was mild "progressive" mitral stenosis  Mean gradient of 5 mmHg at 73 bpm  MVA by Doppler continuity equation is 2 15 cm2  There was no regurgitation  AORTIC VALVE: The valve was trileaflet  Leaflets exhibited normal thickness and normal cuspal separation  DOPPLER: Transaortic velocity was within the normal range  There was no evidence for stenosis  There was no regurgitation  TRICUSPID VALVE: The valve structure was normal  There was normal leaflet separation  DOPPLER: The transtricuspid velocity was within the normal range  There was no evidence for stenosis  There was mild regurgitation  Estimated peak PA  pressure was 34 mmHg  PULMONIC VALVE: Not well visualized  DOPPLER: There was no evidence for stenosis  There was trace regurgitation  PERICARDIUM: A trace pericardial effusion was identified  The pericardium was normal in appearance  AORTA: The root exhibited normal size  SYSTEMIC VEINS: IVC: The inferior vena cava was normal in size and course  Respirophasic changes were normal     SYSTEM MEASUREMENT TABLES    2D  %FS: 25 15 %  Ao Diam: 2 77 cm  EDV(Teich): 136 76 ml  EF(Teich): 49 26 %  ESV(Teich): 69 39 ml  IVSd: 1 07 cm  LA Area: 30 26 cm2  LA Diam: 4 91 cm  LVEDV MOD A4C: 210 21 ml  LVEF MOD A4C: 46 53 %  LVESV MOD A4C: 112 41 ml  LVIDd: 5 32 cm  LVIDs: 3 99 cm  LVLd A4C: 9 67 cm  LVLs A4C: 8 21 cm  LVPWd: 1 1 cm  RA Area: 18 38 cm2  RVIDd: 3 28 cm  SV MOD A4C: 97 8 ml  SV(Teich): 67 38 ml    CW  TR Vmax: 2 78 m/s  TR maxP 99 mmHg    MM  TAPSE: 2 42 cm    PW  E' Sept: 0 06 m/s  E/E' Sept: 19 84  MV A [place]: 1 49 m/s  MV Dec [place]: 9 08 m/s2  MV DecT: 137 82 ms  MV E [place]: 1 25 m/s  MV E/A Ratio: 0 84  MV PHT: 39 97 ms  MVA By PHT: 5 5 cm2    [facility] Accredited Echocardi[facility]    Prepared and electronically signed by    INCOLE Johnson  Signed 24-Aug-2021 14:54:43    ======================================================     Imaging:   I have personally reviewed pertinent reports          EKG:  Normal sinus rhythm

## 2021-09-01 NOTE — ASSESSMENT & PLAN NOTE
Lab Results   Component Value Date    HGBA1C 7 1 (H) 08/03/2021       Recent Labs     08/31/21  1114 08/31/21  1617 08/31/21 2114 09/01/21  0726   POCGLU 122 86 170* 93       Blood Sugar Average: Last 72 hrs:  · (P) 142 4   · home regimen Lantus 50 units b i d , Humalog 10 units t i d  with meals and Trulicity once weekly

## 2021-09-01 NOTE — PLAN OF CARE
Problem: PHYSICAL THERAPY ADULT  Goal: Performs mobility at highest level of function for planned discharge setting  See evaluation for individualized goals  Description: Treatment/Interventions: Functional transfer training, LE strengthening/ROM, Elevations, Therapeutic exercise, Endurance training, Patient/family training, Equipment eval/education, Bed mobility, Gait training, Compensatory technique education, Continued evaluation, Spoke to nursing, OT, Spoke to advanced practitioner  Equipment Recommended:  (has rollator)       See flowsheet documentation for full assessment, interventions and recommendations  Outcome: Progressing  Note: Prognosis: Good  Problem List: Decreased strength, Decreased endurance, Impaired balance, Decreased mobility, Impaired sensation, Obesity  Assessment: Hermelindo Bello is a 63 y/o female admitted with acute CHF, UTI, possible NSTEMI  Suprapubic cath exchange on 8/30  S/P Cardiac cath on 8/31  PT consulted for d/c planning  Prior to admission resides in apt with 6+3 DOROTHEA  Ambulates with cane, however also has rollator walker if needed  S for ambulation prior  Daughter is caregiver and resides with patient providing 25* support  Provides assistance for ADLS  Denies hx of falls  Currently presents with functional limitations related to decreased activity tolerance, balance, sensation with hx of neuropathy, strength, functional mobility and locomotion requiring more restrictive AD of RW support  Gait slowed, decreased foot clearance and step length  Min A needed for transfers and ambulation  Risk for falls given impairments  Will benefit from skilled PT in order to progress and optimize outcomes  The patient's AM-PAC Basic Mobility Inpatient Short Form Raw Score is 19, Standardized Score is 42 48  A standardized score less than 42 9 suggests the patient may benefit from discharge to post-acute rehabilitation services   Please also refer to the recommendation of the Physical Therapist for safe discharge planning  May benefit from STR to optimize outcomes, however has 24* support from daughter therefore at least HHPT with 24* assistance if rehab deferred  Will follow and progress  Barriers to Discharge: Inaccessible home environment  Barriers to Discharge Comments: 3+ 6 DOROTHEA     PT Discharge Recommendation: Post acute rehabilitation services (vs hhpt- has 24* care from daughter )          See flowsheet documentation for full assessment

## 2021-09-01 NOTE — OCCUPATIONAL THERAPY NOTE
Occupational Therapy Evaluation     Patient Name: Dieter Richardson  RCQKE'N Date: 9/1/2021  Problem List  Principal Problem:    New onset of congestive heart failure ([facility] )  Active Problems:    Type 2 diabetes mellitus with diabetic polyneuropathy, with long-term current use of insulin ([facility])    CAD (coronary artery disease)    Normochromic normocytic anemia    HTN (hypertension)    CKD (chronic kidney disease) stage 4, GFR 15-29 ml/min ([facility])    Urinary tract infection associated with cystostomy catheter ([facility] )    Past Medical History  Past Medical History:   Diagnosis Date    Abnormal liver function     Anemia     Anxiety     Arthritis     Chronic kidney disease     stage 3    Chronic narcotic dependence ([facility])     Chronic pain disorder     lower back, hands , neck and knees    Coronary artery disease     Depression     Diabetes mellitus ([facility] )     GERD (gastroesophageal reflux disease)     no meds at present    Heart murmur     murmur    Hyperlipidemia     Hypertension     Neurogenic bladder     Open toe wound 12/2020    right big toe open calus but is dry at present    Renal disorder     Shortness of breath     exertion    Sleep apnea     doesn't use cpap    Suprapubic catheter [facility])      Past Surgical History  Past Surgical History:   Procedure Laterality Date    AMPUTATION      ANGIOPLASTY  2017    5    BREAST EXCISIONAL BIOPSY Left     BREAST SURGERY      CARDIAC CATHETERIZATION      CARPAL TUNNEL RELEASE Bilateral     CERVICAL FUSION      HYSTERECTOMY  2008    IR SUPRAPUBIC CATHETER PLACEMENT  6/15/2021    KNEE SURGERY      OOPHORECTOMY  2008    CT EXC SKIN BENIG 3 1-4 CM REMAINDR BODY N/A 12/21/2020    Procedure: EXCISION SEBACEOUS CYST X 5 SCALP;  Surgeon: Arin Marx MD;  Location: [address] [place] [address];  Service: General    TOE AMPUTATION Left     TRIGGER FINGER RELEASE Right     4th Finger             09/01/21 1019   OT Last Visit   OT Visit Date 09/01/21   Note Type   Note type Evaluation   Restrictions/Precautions   Weight Bearing Precautions Per Order No   Other Precautions Fall Risk;Telemetry   Pain Assessment   Pain Assessment Tool 0-10   Pain Score No Pain   Home Living   Type of Home Apartment   Home Layout One level  (6 steps down and 3 steps up )   Bathroom Shower/Tub Walk-in shower   Bathroom Toilet Standard   Bathroom Equipment Grab bars in shower   [address]  (rollator walker)   Additional Comments pt daughter is her paid caregiver for 43 hours and pt daughter provides 24/7 support/assist   Prior Function   Level of [place] Needs assistance with IADLs; Needs assistance with ADLs and functional mobility  (setup-min assist UB ADLs, MOD assist LB ADLs, MODItoileting)   Lives With Daughter   Receives Help From Family   ADL Assistance Needs assistance   IADLs Needs assistance   Falls in the last 6 months 0   Vocational On disability   Comments pt reports use of SPC in apt and rollator in community, daughter always providing SBA for mobility; daughter does medication management   Lifestyle   Autonomy per pt setup grooming, setup-min asssist UB ADLs, MIN-MOD assist LB ADLs, MOD I toileting, supervision functional transfers and mobility w/ SPC or rollator   Reciprocal Relationships daughter   Service to Others on disability   Intrinsic Gratification watching tv, mysteries   Subjective   Subjective "I am okay"   ADL   Where Assessed Chair   Eating Assistance 5  Supervision/Setup   Grooming Assistance 5  Supervision/Setup   UB Bathing Assistance 5  Supervision/Setup   LB Bathing Assistance 4  [address] 5  Supervision/Setup   LB Dressing Assistance 3  Moderate [address]  4  Minimal [address] 4  Minimal Assistance   Bed Mobility   Supine to Sit 5  Supervision   Additional items HOB elevated; Increased time required   Transfers   Sit to Stand 4  Minimal assistance   Additional items Assist x 1; Increased time required;Verbal cues;Armrests   Stand to Sit 4  Minimal assistance   Additional items Assist x 1; Increased time required;Verbal cues;Armrests   Additional Comments increased time, cues for walker safety   Functional Mobility   Functional Mobility 4  Minimal assistance   Additional Comments assist x1 w/ RW   Additional items Rolling walker   Balance   Static Sitting Fair +   Dynamic Sitting Fair   Static Standing Fair   Dynamic Standing Fair -   Activity Tolerance   Activity Tolerance Patient tolerated treatment well;Patient limited by fatigue   Nurse Made Aware appropriate to see per Karlo BROWN   RUE Assessment   RUE Assessment WFL  (4-/5)   LUE Assessment   LUE Assessment WFL  (4-/5)   Hand Function   Gross Motor Coordination Functional   Fine Motor Coordination Functional   Sensation   Light Touch No apparent deficits   Proprioception   Proprioception No apparent deficits   Vision-Basic Assessment   Current Vision Wears glasses all the time   Vision - Complex Assessment   Ocular Range of Motion [facility]   Acuity Able to read clock/calendar on wall without difficulty   Perception   Inattention/Neglect Appears intact   Cognition   Overall Cognitive Status Impaired   Arousal/Participation Responsive; Cooperative   Attention Within functional limits   Orientation Level Oriented to person;Oriented to place;Oriented to time  (oriented to month, not oriented to specific date/year)   Memory Decreased short term memory;Decreased recall of recent events;Decreased recall of precautions   Following Commands Follows one step commands with increased time or repetition   Comments pt pleasant and cooperative   Assessment   Limitation Decreased ADL status; Decreased Safe judgement during ADL;Decreased UE strength;Decreased endurance;Decreased cognition;Decreased self-care trans;Decreased high-level ADLs   Prognosis Good   Assessment Pt is a 64 y o  female seen for OT evaluation s/p admit to [facility] on 8/29/2021 w/ New onset of congestive heart failure ([facility] )  Comorbidities affecting pt's functional performance at time of assessment include: CKD III, CAD w/ recent cardiac cath, DM II, hyperlipidemia, UTI, neurogenic bladder w/ suprapubic catheter, HTN  Personal factors affecting pt at time of IE include: lives w/ daughter who is her caregiver and provides 24/7 support/assist  Prior to admission, pt was per pt setup grooming, setup-min asssist UB ADLs, MIN-MOD assist LB ADLs, MOD I toileting, supervision functional transfers and mobility w/ SPC or rollator, assist w/ IADLs Upon evaluation: Pt requires supervision bed mobility, MIN assist sit<>stand w/ VCs for hand placement and positioning, MIN assist x1 functional mobility w/ RW, MIN-MOD assist LB ADLs, setup UB ADLS 2* the following deficits impacting occupational performance: decreased strength and endurance, impaired balance, impaired activity tolerance, fall risk, sTM deficits  Pt and daughter receptive to CHF education of weighing self daily after using bathroom and before eating and what weight gains to look for and eating a low sodium diet  Pt to benefit from continued skilled OT tx while in the hospital to address deficits as defined above and maximize level of functional independence w ADL's and functional mobility  Occupational Performance areas to address include: grooming, bathing/shower, toilet hygiene, dressing, health maintenance, functional mobility and clothing management, CHF education  From OT standpoint, recommendation at time of d/c would be home w/ HOME OT and support; STR is beneficial pt and daughter prefer home  The patient's raw score on the AM-PAC Daily Activity inpatient short form is 18, standardized score is 38 66, less than 39 4  Patients at this level are likely to benefit from discharge to post-acute rehabilitation services   Please refer to the recommendation of the Occupational Therapist for safe discharge planning  Goals   Patient Goals "to go home"   LTG Time Frame 10-14   Long Term Goal please see below goals   Plan   Treatment Interventions ADL retraining;Functional transfer training;UE strengthening/ROM; Endurance training;Cognitive reorientation;Patient/family training;Equipment evaluation/education; Compensatory technique education; Energy conservation; Activityengagement   Goal Expiration Date 09/15/21   OT Frequency 2-3x/wk   Recommendation   OT Discharge Recommendation Home with home health rehabilitation  (hOME OT, may benefit from STR)   OT - OK to Discharge   (when medically stable)   AM-PAC Daily Activity Inpatient   Lower Body Dressing 2   Bathing 3   Toileting 3   Upper Body Dressing 3   Grooming 3   Eating 4   Daily Activity Raw Score 18   Daily Activity Standardized Score (Calc for Raw Score >=11) 38 66   AM-PAC Applied Cognition Inpatient   Following a Speech/Presentation 4   Understanding Ordinary Conversation 4   Taking Medications 2   Remembering Where Things Are Placed or Put Away 2   Remembering List of 4-5 Errands 2   Taking Care of Complicated Tasks 1   Applied Cognition Raw Score 15   Applied Cognition Standardized Score 33 54     Occupational Therapy Goals to be met in 10-14 days:  1) Pt will improve activity tolerance to G for 30 min txment sessions  2) Pt will complete ADLs/self care w/ supervision w/ [facility]  3) Pt will complete toileting w/ supervision w/ G hygiene/thoroughness using DME PRN  4) Pt will improve functional transfers on/off all surfaces using DME PRN w/ G balance/safety including toileting w/ supervision  5) Pt will improve fx'l mobility during I/ADl/leisure tasks using DME PRN w/ g balance/safety w/ supervision  6) Pt will engage in ongoing cognitive assessment w/ G participation to A w/ safe d/c planning/recommendations  7) Pt will demonstrate G carryover of pt/caregiver education and training as appropriate w/ mod I  w/ G tolerance  8) Pt will engage in depression screen/leisure interest checklist w/ G participation to monitor s/s depression and ID 3 positive coping strategies to A w/ emotional regulation and management  9) Pt will demonstrate 100% carryover of E C  techniques w/ mod I t/o fx'l I/ADL/leisure tasks w/o cues s/p skilled education  10) Pt will demonstrate improved standing tolerance to 3-5 minutes during functional tasks w/ Good - dynamic standing balance to enhance ADL performance  11) Pt will demonstrate improved b/l UE strength by 1 MMT grade to enhance ADLS and functional transfers  12) Pt will demonstrate and recall self-monitoring techniques for CHF (such as daily weights on scale, reading scale, modified diets) at MOD I level s/p education and training to enhance health maintenance routines at home       Documentation completed by: Jaja Gambino MS, OTR/L

## 2021-09-01 NOTE — PHYSICAL THERAPY NOTE
PT EVALUATION 09:59-10:19 ( 20 minutes)    64 y o     [id number]    CHF (congestive heart failure) ([facility]) [I50 9]    Past Medical History:   Diagnosis Date    Abnormal liver function     Anemia     Anxiety     Arthritis     Chronic kidney disease     stage 3    Chronic narcotic dependence (HCC)     Chronic pain disorder     lower back, hands , neck and knees    Coronary artery disease     Depression     Diabetes mellitus ([address] )     GERD (gastroesophageal reflux disease)     no meds at present    Heart murmur     murmur    Hyperlipidemia     Hypertension     Neurogenic bladder     Open toe wound 12/2020    right big toe open calus but is dry at present    Renal disorder     Shortness of breath     exertion    Sleep apnea     doesn't use cpap    Suprapubic catheter [facility])          Past Surgical History:   Procedure Laterality Date    AMPUTATION      ANGIOPLASTY  2017    5    BREAST EXCISIONAL BIOPSY Left     BREAST SURGERY      CARDIAC CATHETERIZATION      CARPAL TUNNEL RELEASE Bilateral     CERVICAL FUSION      HYSTERECTOMY  2008    IR SUPRAPUBIC CATHETER PLACEMENT  6/15/2021    KNEE SURGERY      OOPHORECTOMY  2008    CT EXC SKIN BENIG 3 1-4 CM REMAINDR BODY N/A 12/21/2020    Procedure: EXCISION SEBACEOUS CYST X 5 SCALP;  Surgeon: Latosha Weldon MD;  Location:  MAIN OR;  Service: General    TOE AMPUTATION Left     TRIGGER FINGER RELEASE Right     4th Finger      09/01/21 0959   PT Last Visit   PT Visit Date 09/01/21   Note Type   Note type Evaluation   Pain Assessment   Pain Score No Pain   Home Living   Type of Home Apartment   Home Layout One level   Bathroom Shower/Tub Walk-in shower   Bathroom Toilet Standard   Bathroom Equipment Grab bars in shower   [address]; Other (Comment)  (rollator walker)   Additional Comments 6 steps down, then 3 DOROTHEA apt through rear of home    Daughter always with her   Prior Function   Level of [place] Needs assistance with ADLs and functional mobility   Lives With Daughter   Receives Help From Family   ADL Assistance Needs assistance   IADLs Needs assistance   Falls in the last 6 months 0   Vocational On disability   Comments A for ADLS and IADLS from daughter  Daughter is her caregier and always  Primarily household distances, limited by SOB  uses cane primarily in home  Restrictions/Precautions   Weight Bearing Precautions Per Order No   Other Precautions Fall Risk;Telemetry   General   Family/Caregiver Present Yes  (daughter present )   Cognition   Overall Cognitive Status Impaired   Arousal/Participation Alert   Attention Within functional limits   Orientation Level Oriented to person;Oriented to place;Oriented to time  (+ month only )   Following Commands Follows one step commands with increased time or repetition   Comments Pleasant  Expresses desire to return home  RUE Assessment   RUE Assessment WFL  (as observed with functional reach and grasp  )   LUE Assessment   LUE Assessment WFL  (as observed with functional reach and grasp  )   RLE Assessment   RLE Assessment WFL  (grossly 4-/5)   LLE Assessment   LLE Assessment WFL  (grossly 4-/5)   Coordination   Movements are Fluid and Coordinated 1   Light Touch   RLE Light Touch Absent   RLE Light Touch Comments hx of neuropathy   LLE Light Touch Absent   LLE Light Touch Comments hx of neuropathy   Bed Mobility   Supine to Sit 5  Supervision   Additional items Increased time required;HOB elevated; Bedrails   Transfers   Sit to Stand 4  Minimal assistance   Additional items Assist x 1; Increased time required;Verbal cues   Stand to Sit 4  Minimal assistance   Additional items Assist x 1; Increased time required;Verbal cues;Armrests  (cues for hand placement)   Additional Comments increased time, cues for hand placement  Ambulation/Elevation   Gait pattern Decreased foot clearance; Improper Weight shift; Inconsistent marilyn; Short stride   Gait Assistance 4  Minimal assist   Additional items Assist x 1;Verbal cues; Tactile cues   Assistive Device Rolling walker   Distance AMB with RW 25'x1, seated rest, then additional 15'x1  RA O2 sat 95%  Balance   Static Sitting Fair +   Dynamic Sitting Fair   Static Standing Fair   Dynamic Standing Fair -   Ambulatory Poor +   Endurance Deficit   Endurance Deficit Yes   Endurance Deficit Description fatigue   Activity Tolerance   Activity Tolerance Patient tolerated treatment well;Patient limited by fatigue   Medical Staff Made Aware Nurse, Shira Berger  Update given to Zora Wallace, PAC re:  d/c planning  Assessment   Prognosis Good   Problem List Decreased strength;Decreased endurance; Impaired balance;Decreased mobility; Impaired sensation;Obesity   Assessment Samuel Mcdonald is a 65 y/o female admitted with acute CHF, UTI, possible NSTEMI  Suprapubic cath exchange on 8/30  S/P Cardiac cath on 8/31  PT consulted for d/c planning  Prior to admission resides in apt with 6+3 DOROTHEA  Ambulates with cane, however also has rollator walker if needed  S for ambulation prior  Daughter is caregiver and resides with patient providing 25* support  Provides assistance for ADLS  Denies hx of falls  Currently presents with functional limitations related to decreased activity tolerance, balance, sensation with hx of neuropathy, strength, functional mobility and locomotion requiring more restrictive AD of RW support  Gait slowed, decreased foot clearance and step length  Min A needed for transfers and ambulation  Risk for falls given impairments  Will benefit from skilled PT in order to progress and optimize outcomes  The patient's AM-PAC Basic Mobility Inpatient Short Form Raw Score is 19, Standardized Score is 42 48  A standardized score less than 42 9 suggests the patient may benefit from discharge to post-acute rehabilitation services   Please also refer to the recommendation of the Physical Therapist for safe discharge planning  May benefit from STR to optimize outcomes, however has 24* support from daughter therefore at least HHPT with 24* assistance if rehab deferred  Will follow and progress  Barriers to Discharge Inaccessible home environment   Barriers to Discharge Comments 3+ 6 DOROTHEA   Goals   Patient Goals go home   STG Expiration Date 09/11/21   Short Term Goal #1 10 days: 1)  Pt will perform bed mobility with Mariusz demonstrating appropriate technique 100% of the time in order to improve function  2)  Perform all transfers with Mariusz demonstrating safe and appropriate technique 100% of the time in order to improve ability to negotiate safely in home environment  3) Amb with least restrictive AD > 80'x1 with mod I in order to demonstrate ability to negotiate in home environment  4)  Improve overall strength and balance 1/2 grade in order to optimize ability to perform functional tasks and reduce fall risk  5) Increase activity tolerance to 30 minutes in order to improve endurance to functional tasks  6)  Negotiate stairs using most appropriate technique and Jose Carlos in order to be able to negotiate safely in home environment  7) PT for ongoing patient and family/caregiver education, DME needs and d/c planning in order to promote highest level of function in least restrictive environment  Plan   Treatment/Interventions Functional transfer training;LE strengthening/ROM; Elevations; Therapeutic exercise; Endurance training;Patient/family training;Equipment eval/education; Bed mobility;Gait training; Compensatory technique education;Continued evaluation;Spoke to nursing;OT;Spoke to advanced practitioner   PT Frequency Other (Comment)  (3-5x/wk)   Recommendation   PT Discharge Recommendation Post acute rehabilitation services  (vs hhpt- has 24* care from daughter )   Equipment Recommended   (has rollator)   AM-PAC Basic Mobility Inpatient   Turning in Bed Without Bedrails 4   Lying on Back to Sitting on Edge of Flat Bed 3   Moving Bed to Chair 3   Standing Up From Chair 3   Walk in Room 3   Climb 3-5 Stairs 3   Basic Mobility Inpatient Raw Score 19   Basic Mobility Standardized Score 42 48     Hx/personal factors: co-morbidities, telemetry, use of AD, fall risk, assist w/ ADL's and obesity  Examination: dec mobility, dec balance, dec endurance, dec amb, risk for falls, assessed body system, balance, endurance, amb, D/C disposition & fall risk, impairements in locomotion, musculoskeletal, balance, endurance, posture, coordination  Clinical: unpredictable (ongoing medical status and risk for falls)  Complexity: high      Epifanio Lab, PT

## 2021-09-01 NOTE — DISCHARGE SUMMARY
[address] [facility]  Discharge- Kurt Pekrins 1960, 64 y o  female MRN: [id number]  Unit/Bed#: E4 -01 Encounter: [id number]  Primary Care Provider: CHERELLE Young   Date and time admitted to hospital: 8/29/2021  2:49 PM    * New onset of congestive heart failure (HCC)  Assessment & Plan  Wt Readings from Last 3 Encounters:   09/01/21 127 kg (279 lb 12 2 oz)   08/27/21 133 kg (292 lb 15 9 oz)   08/03/21 132 kg (290 lb)     · Patient presented to the ED with shortness of breath and weight gain   · Initially required IV Lasix, switch to p o  torsemide 20 mg b i d  -yesterday for cardiac catheterization  · Continue cardiac diet with fluid and salt restriction  · Status post cardiac catheterization 8/31 100% chronic total occlusion proximal RCA  · Echocardiogram showing EF of 45%, mild left ventricular hypertrophy, grade 2 diastolic dysfunction  · Outpatient Cardiology follow-up  · Continue medical management with Imdur, BB, aspirin, statin, plavix  · Per nephrology recommendations torsemide 20 mg prn for weight gain of 3 lbs in one day or 5 lb over 3 days, increased leg swelling or shortness of breath    Urinary tract infection associated with cystostomy catheter ([facility] [address] )  Assessment & Plan  · History of suprapubic catheter with neurogenic bladder   · Was found to have positive UA at time of admission  · Received IV ceftriaxone, completed 7 days of antibiotics  · Urology performed catheter exchanged while hospitalized    CKD (chronic kidney disease) stage 4, GFR 15-29 ml/min [facility])  Assessment & Plan  Lab Results   Component Value Date    EGFR 30 09/01/2021    EGFR 25 08/31/2021    EGFR 20 08/30/2021    CREATININE 1 82 (H) 09/01/2021    CREATININE 2 13 (H) 08/31/2021    CREATININE 2 60 (H) 08/30/2021     · History of CKD stage 4   · Nephrology following, Cr stable post cath   · Repeat BMP in 2-3 days with follow up with Dr Donny Carmen outpatient   · Okay to resume torsemide 20 mg prn for weight gain of 3 lb in 1 day or 5 lb over 3 days or increased leg swelling      HTN (hypertension)  Assessment & Plan  · Continue norvasc, Coreg, Imdur     Normochromic normocytic anemia  Assessment & Plan  · Hemoglobin 8 8 ; at baseline   · Likely component of CKD stage 4  · Continue iron supplementation   · Fecal occult blood negative      CAD (coronary artery disease)  Assessment & Plan  · History of CAD with multiple stents placed in 2012 and 2017  · Continue Imdur, Plavix, aspirin and Coreg  · Continue daily statin      Type 2 diabetes mellitus with diabetic polyneuropathy, with long-term current use of insulin [facility])  Assessment & Plan  Lab Results   Component Value Date    HGBA1C 7 1 (H) 08/03/2021       Recent Labs     08/31/21  1114 08/31/21  1617 08/31/21  2114 09/01/21  0726   POCGLU 122 86 170* 93       Blood Sugar Average: Last 72 hrs:  · (P) 142 4   · home regimen Lantus 50 units b i d , Humalog 10 units t i d  with meals and Trulicity once weekly         Medical Problems     Resolved Problems  Date Reviewed: 9/1/2021    None              Discharging Physician / Practitioner: Kelby Flores PA-C  PCP: Purvi Jama  Admission Date:   Admission Orders (From admission, onward)     Ordered        08/29/21 1452  Inpatient Admission  Once                   Discharge Date: 09/01/21    Consultations During Hospital Stay:  · Nephrology  · Urology  · Cardiology    Procedures Performed:   · Suprapubic Camargo exchange on 08/30/2021  · Cardiac catheterization 08/31/2021  SUMMARY     CORONARY CIRCULATION:  Proximal RCA: There was a 100 % stenosis  This lesion is a chronic total occlusion      INDICATIONS:  --  Possible CAD: myocardial infarction without ST elevation (NSTEMI)  --  Congestive heart failure with ischemic cardiomyopathy      PROCEDURES PERFORMED     --  Left heart catheterization without ventriculogram   --  Left coronary angiography  --  Right coronary angiography  --  Inpatient    --  Mod Sedation Same Physician Initial 15min  --  Coronary Catheterization (w/ [facility])     PROCEDURE: The risks and alternatives of the procedures and conscious sedation were explained to the patient and informed consent was obtained  The patient was brought to the cath lab and placed on the table  The planned puncture sites  were prepped and draped in the usual sterile fashion      --  Right radial artery access  After performing an Erasto's test to verify adequate ulnar artery supply to the hand, the radial site was prepped  The puncture site was infiltrated with local anesthetic  The vessel was accessed using the  modified Seldinger technique, a wire was advanced into the vessel, and a sheath was advanced over the wire into the vessel      --  Left heart catheterization without ventriculogram  A catheter was advanced over a guidewire into the ascending aorta  After recording ascending aortic pressure, the catheter was advanced across the aortic valve and left ventricular  pressure was recorded  The catheter was pulled back across the aortic valve and into the ascending aorta and pullback pressures were obtained      --  Left coronary artery angiography  A catheter was advanced over a guidewire into the aorta and positioned in the left coronary artery ostium under fluoroscopic guidance  Angiography was performed      --  Right coronary artery angiography  A catheter was advanced over a guidewire into the aorta and positioned in the right coronary artery ostium under fluoroscopic guidance  Angiography was performed      --  Inpatient      --  Mod Sedation Same Physician Initial 15min      --  Coronary Catheterization (w/ [facility])     PROCEDURE COMPLETION: The patient tolerated the procedure well and was discharged from the cath lab  TIMING: Test started at 14:33  Test concluded at 14:45  HEMOSTASIS: The sheath was removed  The site was compressed with a Hemoband  device  Hemostasis was obtained   MEDICATIONS GIVEN: Fentanyl (1OOmcg/2 ml), 50 mcg, IV, at 14:30  Versed (2mg/2ml), 2 mg, IV, at 14:30  Zofran (Ondansetron), 4 mg, IV, at 14:34  1% Lidocaine, 2 ml, subcutaneously, at 14:35  Nitroglycerin  (200mcg/ml), 200 mcg, at 14:37  Verapamil (5mg/2ml), 5 mg, IV, at 14:37  Heparin 1000 units/ml, 4,000 units, IV, at 14:37  CONTRAST GIVEN: 30 ml Visipaque (320mg I /ml)  RADIATION EXPOSURE: Fluoroscopy time: 1 32 min      HEMODYNAMICS: Hemodynamic assessment demonstrated normal LVEDP  12-14     CORONARY VESSELS:   --  The coronary circulation is left dominant  --  Left main: The vessel was medium to large sized  Angiography showed mild atherosclerosis  --  LAD: The vessel was large sized  Angiography showed no evidence of disease  --  Proximal LAD: There was a discrete 30 % stenosis at the site of a prior stent  --  Mid LAD: There was a 0 % stenosis at the site of a prior stent  --  Distal LAD: There was a 0 % stenosis at the site of a prior stent  --  Proximal circumflex: There was a 0 % stenosis at the site of a prior stent  --  RCA: The vessel was small (non-dominant)  --  Proximal RCA: There was a 100 % stenosis  This lesion is a chronic total occlusion      IMPRESSIONS:  Type 2 MI  There is significant single vessel coronary artery disease      RECOMMENDATIONS  The patient should continue with the present medications      Significant Findings / Test Results:   · Transthoracic echocardiogram  SUMMARY     LEFT VENTRICLE:  Systolic function was mildly reduced by visual assessment  Ejection fraction was estimated to be 45 %  There were regional wall motion abnormalities that suggest coronary artery disease  There was severe hypokinesis of the basal-mid anteroseptal and basal-mid inferoseptal wall(s)  There was moderate hypokinesis of the basal-mid inferior wall(s)  Wall thickness was mildly increased  There was mild concentric hypertrophy  Features were consistent with grade 2 diastolic dysfunction    Doppler parameters were consistent with high ventricular filling pressure  E/E' was > 19     VENTRICULAR SEPTUM:  There was dyssynergic motion  These changes are consistent with LBBB      MITRAL VALVE:  There was mild "progressive" mitral stenosis  Mean gradient of 5 mmHg at 73 bpm  MVA by Doppler continuity equation is 2 15 cm2      TRICUSPID VALVE:  There was mild regurgitation      PULMONIC VALVE:  There was trace regurgitation      PERICARDIUM:  A trace pericardial effusion was identified  Test Results Pending at Discharge (will require follow up): · Repeat BMP in 2-3 days     Outpatient Tests Requested:  · Nephrology  · Cardiology  · Urology  · PCP    Complications:  Stage 4 chronic kidney disease, urinary tract infection    Reason for Admission: new onset CHF     Hospital Course:   Ej Cardenas is a 64 y o  female patient who originally presented to the hospital on 8/29/2021   Patient was transferred to [facility] from [address] [place] [address] due to new onset heart failure for cardiology evaluation  She originally presented to the emergency department with shortness of breath and weight gain  She underwent echocardiogram on 08/24 which revealed ejection fraction 45%, there were regional wall motion abnormalities to suggest coronary artery disease, severe hypokinesis of the basal mid anterior septal and basal mid inferior septal walls, grade 2 diastolic dysfunction  Patient was originally started on IV diuretics  She underwent cardiac catheterization on 08/31/2021 which revealed significant single-vessel coronary artery disease with 100% chronic total occlusion of the proximal RCA  Patient should continue on medical management and will continue on aspirin, Plavix, statin, Coreg, Imdur  She will need close follow-up with Cardiology outpatient setting  Patient was seen by PT/OT  She has 24/7 care at home and will have resumption of care upon discharge         Patient was seen in consultation with Nephrology for stage 4 chronic kidney disease  Her renal function was stable post cardiac catheterization and she was stable for discharge from Nephrology standpoint with repeat BMP in 2-3 days with follow-up with Dr Kris Leger outpatient  Patient was seen in consultation with Urology with suprapubic Morales exchange on 08/30  Patient received 7 days of antibiotics while hospitalized for urinary tract infection  Patient will need outpatient ultrasound kidney and bladder with Urology follow-up in outpatient setting  Patient will continue follow-up with Urology for monthly tube exchanges in the urology office  Please see above list of diagnoses and related plan for additional information  Condition at Discharge: stable    Discharge Day Visit / Exam:   Subjective:  Patient resting in bed without any acute complaints  No chest pain or shortness of breath  No other acute complaints  No fevers or chills  Vitals: Blood Pressure: 129/60 (09/01/21 0700)  Pulse: 59 (09/01/21 0700)  Temperature: 97 9 °F (36 6 °C) (09/01/21 0700)  Temp Source: Tympanic (09/01/21 0700)  Respirations: 18 (09/01/21 0700)  Height: 5' 8" (172 7 cm) (08/30/21 0300)  Weight - Scale: 127 kg (279 lb 12 2 oz) (09/01/21 0600)  SpO2: 95 % (09/01/21 0700)  Exam:   Physical Exam  Vitals and nursing note reviewed  Constitutional:       General: She is not in acute distress  Appearance: She is obese  She is not ill-appearing or toxic-appearing  HENT:      Head: Normocephalic  Eyes:      Conjunctiva/sclera: Conjunctivae normal    Cardiovascular:      Rate and Rhythm: Normal rate and regular rhythm  Pulmonary:      Breath sounds: Normal breath sounds  Abdominal:      General: Bowel sounds are normal       Comments: Suprapubic morales    Musculoskeletal:      Right lower leg: No edema  Left lower leg: No edema  Skin:     Coloration: Skin is pale  Neurological:      Mental Status: She is alert  Mental status is at baseline     Psychiatric:         Mood and Affect: Mood normal          Discussion with Family: Updated  (daughter) via phone  Discharge instructions/Information to patient and family:   See after visit summary for information provided to patient and family  Provisions for Follow-Up Care:  See after visit summary for information related to follow-up care and any pertinent home health orders  Disposition:   Home with [facility] Services (Reminder: Complete face to face encounter)    Planned Readmission:      Discharge Statement:  I spent 45 minutes discharging the patient  This time was spent on the day of discharge  I had direct contact with the patient on the day of discharge  Greater than 50% of the total time was spent examining patient, answering all patient questions, arranging and discussing plan of care with patient as well as directly providing post-discharge instructions  Additional time then spent on discharge activities  Discharge Medications:  See after visit summary for reconciled discharge medications provided to patient and/or family        **Please Note: This note may have been constructed using a voice recognition system**

## 2021-09-01 NOTE — PLAN OF CARE
Problem: Potential for Falls  Goal: Patient will remain free of falls  Description: INTERVENTIONS:  - Educate patient/family on patient safety including physical limitations  - Instruct patient to call for assistance with activity   - Consult OT/PT to assist with strengthening/mobility   - Keep Call bell within reach  - Keep bed low and locked with side rails adjusted as appropriate  - Keep care items and personal belongings within reach  - Initiate and maintain comfort rounds  - Make Fall Risk Sign visible to staff  - Offer Toileting every 1 Hours, in advance of need  - Initiate/Maintain bed alarm  - Obtain necessary fall risk management equipment: bed alarm  - Apply yellow socks and bracelet for high fall risk patients  - Consider moving patient to room near nurses station  Outcome: Progressing     Problem: CARDIOVASCULAR - ADULT  Goal: Maintains optimal cardiac output and hemodynamic stability  Description: INTERVENTIONS:  - Monitor I/O, vital signs and rhythm  - Monitor for S/S and trends of decreased cardiac output  - Administer and titrate ordered vasoactive medications to optimize hemodynamic stability  - Assess quality of pulses, skin color and temperature  - Assess for signs of decreased coronary artery perfusion  - Instruct patient to report change in severity of symptoms  Outcome: Progressing  Goal: Absence of cardiac dysrhythmias or at baseline rhythm  Description: INTERVENTIONS:  - Continuous cardiac monitoring, vital signs, obtain 12 lead EKG if ordered  - Administer antiarrhythmic and heart rate control medications as ordered  - Monitor electrolytes and administer replacement therapy as ordered  Outcome: Progressing     Problem: RESPIRATORY - ADULT  Goal: Achieves optimal ventilation and oxygenation  Description: INTERVENTIONS:  - Assess for changes in respiratory status  - Assess for changes in mentation and behavior  - Position to facilitate oxygenation and minimize respiratory effort  - Oxygen administered by appropriate delivery if ordered  - Initiate smoking cessation education as indicated  - Encourage broncho-pulmonary hygiene including cough, deep breathe, Incentive Spirometry  - Assess the need for suctioning and aspirate as needed  - Assess and instruct to report SOB or any respiratory difficulty  - Respiratory Therapy support as indicated  Outcome: Progressing     Problem: GASTROINTESTINAL - ADULT  Goal: Minimal or absence of nausea and/or vomiting  Description: INTERVENTIONS:  - Administer IV fluids if ordered to ensure adequate hydration  - Maintain NPO status until nausea and vomiting are resolved  - Nasogastric tube if ordered  - Administer ordered antiemetic medications as needed  - Provide nonpharmacologic comfort measures as appropriate  - Advance diet as tolerated, if ordered  - Consider nutrition services referral to assist patient with adequate nutrition and appropriate food choices  Outcome: Progressing  Goal: Maintains or returns to baseline bowel function  Description: INTERVENTIONS:  - Assess bowel function  - Encourage oral fluids to ensure adequate hydration  - Administer IV fluids if ordered to ensure adequate hydration  - Administer ordered medications as needed  - Encourage mobilization and activity  - Consider nutritional services referral to assist patient with adequate nutrition and appropriate food choices  Outcome: Progressing  Goal: Maintains adequate nutritional intake  Description: INTERVENTIONS:  - Monitor percentage of each meal consumed  - Identify factors contributing to decreased intake, treat as appropriate  - Assist with meals as needed  - Monitor I&O, weight, and lab values if indicated  - Obtain nutrition services referral as needed  Outcome: Progressing  Goal: Establish and maintain optimal ostomy function  Description: INTERVENTIONS:  - Assess bowel function  - Encourage oral fluids to ensure adequate hydration  - Administer IV fluids if ordered to ensure adequate hydration   - Administer ordered medications as needed  - Encourage mobilization and activity  - Nutrition services referral to assist patient with appropriate food choices  - Assess stoma site  - Consider wound care consult   Outcome: Progressing  Goal: Oral mucous membranes remain intact  Description: INTERVENTIONS  - Assess oral mucosa and hygiene practices  - Implement preventative oral hygiene regimen  - Implement oral medicated treatments as ordered  - Initiate Nutrition services referral as needed  Outcome: Progressing     Problem: GENITOURINARY - ADULT  Goal: Maintains or returns to baseline urinary function  Description: INTERVENTIONS:  - Assess urinary function  - Encourage oral fluids to ensure adequate hydration if ordered  - Administer IV fluids as ordered to ensure adequate hydration  - Administer ordered medications as needed  - Offer frequent toileting  - Follow urinary retention protocol if ordered  Outcome: Progressing  Goal: Absence of urinary retention  Description: INTERVENTIONS:  - Assess patients ability to void and empty bladder  - Monitor I/O  - Bladder scan as needed  - Discuss with physician/AP medications to alleviate retention as needed  - Discuss catheterization for long term situations as appropriate  Outcome: Progressing  Goal: Urinary catheter remains patent  Description: INTERVENTIONS:  - Assess patency of urinary catheter  - If patient has a chronic morales, consider changing catheter if non-functioning  - Follow guidelines for intermittent irrigation of non-functioning urinary catheter  Outcome: Progressing     Problem: METABOLIC, FLUID AND ELECTROLYTES - ADULT  Goal: Electrolytes maintained within normal limits  Description: INTERVENTIONS:  - Monitor labs and assess patient for signs and symptoms of electrolyte imbalances  - Administer electrolyte replacement as ordered  - Monitor response to electrolyte replacements, including repeat lab results as appropriate  - Instruct patient on fluid and nutrition as appropriate  Outcome: Progressing  Goal: Fluid balance maintained  Description: INTERVENTIONS:  - Monitor labs   - Monitor I/O and WT  - Instruct patient on fluid and nutrition as appropriate  - Assess for signs & symptoms of volume excess or deficit  Outcome: Progressing  Goal: Glucose maintained within target range  Description: INTERVENTIONS:  - Monitor Blood Glucose as ordered  - Assess for signs and symptoms of hyperglycemia and hypoglycemia  - Administer ordered medications to maintain glucose within target range  - Assess nutritional intake and initiate nutrition service referral as needed  Outcome: Progressing     Problem: HEMATOLOGIC - ADULT  Goal: Maintains hematologic stability  Description: INTERVENTIONS  - Assess for signs and symptoms of bleeding or hemorrhage  - Monitor labs  - Administer supportive blood products/factors as ordered and appropriate  Outcome: Progressing     Problem: MUSCULOSKELETAL - ADULT  Goal: Maintain or return mobility to safest level of function  Description: INTERVENTIONS:  - Assess patient's ability to carry out ADLs; assess patient's baseline for ADL function and identify physical deficits which impact ability to perform ADLs (bathing, care of mouth/teeth, toileting, grooming, dressing, etc )  - Assess/evaluate cause of self-care deficits   - Assess range of motion  - Assess patient's mobility  - Assess patient's need for assistive devices and provide as appropriate  - Encourage maximum independence but intervene and supervise when necessary  - Involve family in performance of ADLs  - Assess for home care needs following discharge   - Consider OT consult to assist with ADL evaluation and planning for discharge  - Provide patient education as appropriate  Outcome: Progressing  Goal: Maintain proper alignment of affected body part  Description: INTERVENTIONS:  - Support, maintain and protect limb and body alignment  - Provide patient/ family with appropriate education  Outcome: Progressing     Problem: PAIN - ADULT  Goal: Verbalizes/displays adequate comfort level or baseline comfort level  Description: Interventions:  - Encourage patient to monitor pain and request assistance  - Assess pain using appropriate pain scale  - Administer analgesics based on type and severity of pain and evaluate response  - Implement non-pharmacological measures as appropriate and evaluate response  - Consider cultural and social influences on pain and pain management  - Notify physician/advanced practitioner if interventions unsuccessful or patient reports new pain  Outcome: Progressing     Problem: INFECTION - ADULT  Goal: Absence or prevention of progression during hospitalization  Description: INTERVENTIONS:  - Assess and monitor for signs and symptoms of infection  - Monitor lab/diagnostic results  - Monitor all insertion sites, i e  indwelling lines, tubes, and drains  - Monitor endotracheal if appropriate and nasal secretions for changes in amount and color  - [place] appropriate cooling/warming therapies per order  - Administer medications as ordered  - Instruct and encourage patient and family to use good hand hygiene technique  - Identify and instruct in appropriate isolation precautions for identified infection/condition  Outcome: Progressing  Goal: Absence of fever/infection during neutropenic period  Description: INTERVENTIONS:  - Monitor WBC    Outcome: Progressing     Problem: SAFETY ADULT  Goal: Patient will remain free of falls  Description: INTERVENTIONS:  - Educate patient/family on patient safety including physical limitations  - Instruct patient to call for assistance with activity   - Consult OT/PT to assist with strengthening/mobility   - Keep Call bell within reach  - Keep bed low and locked with side rails adjusted as appropriate  - Keep care items and personal belongings within reach  - Initiate and maintain comfort rounds  - Make Fall Risk Sign visible to staff  - Offer Toileting every 2 Hours, in advance of need  - Initiate/Maintain bed alarm  - Obtain necessary fall risk management equipment: bed alarm  - Apply yellow socks and bracelet for high fall risk patients  - Consider moving patient to room near nurses station  Outcome: Progressing  Goal: Maintain or return to baseline ADL function  Description: INTERVENTIONS:  -  Assess patient's ability to carry out ADLs; assess patient's baseline for ADL function and identify physical deficits which impact ability to perform ADLs (bathing, care of mouth/teeth, toileting, grooming, dressing, etc )  - Assess/evaluate cause of self-care deficits   - Assess range of motion  - Assess patient's mobility; develop plan if impaired  - Assess patient's need for assistive devices and provide as appropriate  - Encourage maximum independence but intervene and supervise when necessary  - Involve family in performance of ADLs  - Assess for home care needs following discharge   - Consider OT consult to assist with ADL evaluation and planning for discharge  - Provide patient education as appropriate  Outcome: Progressing  Goal: Maintains/Returns to pre admission functional level  Description: INTERVENTIONS:  - Perform BMAT or MOVE assessment daily    - Set and communicate daily mobility goal to care team and patient/family/caregiver  - Collaborate with rehabilitation services on mobility goals if consulted  - Perform Range of Motion 3 times a day  - Reposition patient every 2 hours    - Dangle patient 3 times a day  - Stand patient 3 times a day  - Ambulate patient 3 times a day  - Out of bed to chair 3 times a day   - Out of bed for meals 3 times a day  - Out of bed for toileting  - Record patient progress and toleration of activity level   Outcome: Progressing     Problem: DISCHARGE PLANNING  Goal: Discharge to home or other facility with appropriate resources  Description: INTERVENTIONS:  - Identify barriers to discharge w/patient and caregiver  - Arrange for needed discharge resources and transportation as appropriate  - Identify discharge learning needs (meds, wound care, etc )  - Arrange for interpretive services to assist at discharge as needed  - Refer to Case Management Department for coordinating discharge planning if the patient needs post-hospital services based on physician/advanced practitioner order or complex needs related to functional status, cognitive ability, or social support system  Outcome: Progressing     Problem: Knowledge Deficit  Goal: Patient/family/caregiver demonstrates understanding of disease process, treatment plan, medications, and discharge instructions  Description: Complete learning assessment and assess knowledge base    Interventions:  - Provide teaching at level of understanding  - Provide teaching via preferred learning methods  Outcome: Progressing     Problem: MOBILITY - ADULT  Goal: Maintain or return to baseline ADL function  Description: INTERVENTIONS:  -  Assess patient's ability to carry out ADLs; assess patient's baseline for ADL function and identify physical deficits which impact ability to perform ADLs (bathing, care of mouth/teeth, toileting, grooming, dressing, etc )  - Assess/evaluate cause of self-care deficits   - Assess range of motion  - Assess patient's mobility; develop plan if impaired  - Assess patient's need for assistive devices and provide as appropriate  - Encourage maximum independence but intervene and supervise when necessary  - Involve family in performance of ADLs  - Assess for home care needs following discharge   - Consider OT consult to assist with ADL evaluation and planning for discharge  - Provide patient education as appropriate  Outcome: Progressing  Goal: Maintains/Returns to pre admission functional level  Description: INTERVENTIONS:  - Perform BMAT or MOVE assessment daily    - Set and communicate daily mobility goal to care team and patient/family/caregiver  - Collaborate with rehabilitation services on mobility goals if consulted  - Perform Range of Motion 3 times a day  - Reposition patient every 2 hours    - Dangle patient 3 times a day  - Stand patient 3 times a day  - Ambulate patient 3 times a day  - Out of bed to chair 3 times a day   - Out of bed for meals 3 times a day  - Out of bed for toileting  - Record patient progress and toleration of activity level   Outcome: Progressing

## 2021-09-01 NOTE — ASSESSMENT & PLAN NOTE
· History of CAD with multiple stents placed in 2012 and 2017  · Continue Imdur, Plavix, aspirin and Coreg  · Continue daily statin

## 2021-09-01 NOTE — PLAN OF CARE
Problem: OCCUPATIONAL THERAPY ADULT  Goal: Performs self-care activities at highest level of function for planned discharge setting  See evaluation for individualized goals  Description: Treatment Interventions: ADL retraining, Functional transfer training, UE strengthening/ROM, Endurance training, Cognitive reorientation, Patient/family training, Equipment evaluation/education, Compensatory technique education, Energy conservation, Activityengagement          See flowsheet documentation for full assessment, interventions and recommendations  9/1/2021 1211 by Jose Enrique Huff OT  Note: Limitation: Decreased ADL status, Decreased Safe judgement during ADL, Decreased UE strength, Decreased endurance, Decreased cognition, Decreased self-care trans, Decreased high-level ADLs  Prognosis: Good  Assessment: Pt is a 64 y o  female seen for OT evaluation s/p admit to [facility] on 8/29/2021 w/ New onset of congestive heart failure ([facility] [address] )  Comorbidities affecting pt's functional performance at time of assessment include: CKD III, CAD w/ recent cardiac cath, DM II, hyperlipidemia, UTI, neurogenic bladder w/ suprapubic catheter, HTN  Personal factors affecting pt at time of IE include: lives w/ daughter who is her caregiver and provides 24/7 support/assist  Prior to admission, pt was per pt setup grooming, setup-min asssist UB ADLs, MIN-MOD assist LB ADLs, MOD I toileting, supervision functional transfers and mobility w/ SPC or rollator, assist w/ IADLs Upon evaluation: Pt requires supervision bed mobility, MIN assist sit<>stand w/ VCs for hand placement and positioning, MIN assist x1 functional mobility w/ RW, MIN-MOD assist LB ADLs, setup UB ADLS 2* the following deficits impacting occupational performance: decreased strength and endurance, impaired balance, impaired activity tolerance, fall risk, sTM deficits   Pt and daughter receptive to CHF education of weighing self daily after using bathroom and before eating and what weight gains to look for and eating a low sodium diet  Pt to benefit from continued skilled OT tx while in the hospital to address deficits as defined above and maximize level of functional independence w ADL's and functional mobility  Occupational Performance areas to address include: grooming, bathing/shower, toilet hygiene, dressing, health maintenance, functional mobility and clothing management, CHF education  From OT standpoint, recommendation at time of d/c would be home w/ HOME OT and support; STR is beneficial pt and daughter prefer home  The patient's raw score on the AM-PAC Daily Activity inpatient short form is 18, standardized score is 38 66, less than 39 4  Patients at this level are likely to benefit from discharge to post-acute rehabilitation services  Please refer to the recommendation of the Occupational Therapist for safe discharge planning  OT Discharge Recommendation: Home with home health rehabilitation (hOME OT, may benefit from STR)  OT - OK to Discharge:  (when medically stable)    9/1/2021 1211 by Norine Galeazzi, OT  Note: Limitation: Decreased ADL status, Decreased Safe judgement during ADL, Decreased UE strength, Decreased endurance, Decreased cognition, Decreased self-care trans, Decreased high-level ADLs  Prognosis: Good  Assessment: Pt is a 64 y o  female seen for OT evaluation s/p admit to [facility] on 8/29/2021 w/ New onset of congestive heart failure ([facility] [address] )  Comorbidities affecting pt's functional performance at time of assessment include: CKD III, CAD w/ recent cardiac cath, DM II, hyperlipidemia, UTI, neurogenic bladder w/ suprapubic catheter, HTN   Personal factors affecting pt at time of IE include: lives w/ daughter who is her caregiver and provides 24/7 support/assist  Prior to admission, pt was per pt setup grooming, setup-min asssist UB ADLs, MIN-MOD assist LB ADLs, MOD I toileting, supervision functional [facility] and [facility], assist w/ IADLs Upon evaluation: Pt requires supervision bed mobility, MIN assist sit<>stand w/ VCs for hand placement and positioning, MIN assist x1 functional mobility w/ RW, MIN-MOD assist LB ADLs, setup UB ADLS 2* the following deficits impacting occupational performance: decreased strength and endurance, impaired balance, impaired activity tolerance, fall risk, sTM deficits  Pt and daughter receptive to CHF education of weighing self daily after using bathroom and before eating and what weight gains to look for and eating a low sodium diet  Pt to benefit from continued skilled OT tx while in the hospital to address deficits as defined above and maximize level of functional independence w ADL's and functional mobility  Occupational Performance areas to address include: grooming, bathing/shower, toilet hygiene, dressing, health maintenance, functional mobility and clothing management, CHF education  From OT standpoint, recommendation at time of d/c would be home w/ HOME OT and support; STR is beneficial pt and daughter prefer home  The patient's raw score on the AM-PAC Daily Activity inpatient short form is 18, standardized score is 38 66, less than 39 4  Patients at this level are likely to benefit from discharge to post-acute rehabilitation services  Please refer to the recommendation of the Occupational Therapist for safe discharge planning       OT Discharge Recommendation: Home with home health rehabilitation (hOME OT, may benefit from STR)  OT - OK to Discharge:  (when medically stable)

## 2021-09-01 NOTE — ASSESSMENT & PLAN NOTE
Wt Readings from Last 3 Encounters:   09/01/21 127 kg (279 lb 12 2 oz)   08/27/21 133 kg (292 lb 15 9 oz)   08/03/21 132 kg (290 lb)     · Patient presented to the ED with shortness of breath and weight gain   · Initially required IV Lasix, switch to p o  torsemide 20 mg b i d  -yesterday for cardiac catheterization  · Continue cardiac diet with fluid and salt restriction  · Status post cardiac catheterization 8/31 100% chronic total occlusion proximal RCA  · Echocardiogram showing EF of 45%, mild left ventricular hypertrophy, grade 2 diastolic dysfunction  · Outpatient Cardiology follow-up  · Continue medical management with Imdur, BB, aspirin, statin, plavix, torsemide

## 2021-09-01 NOTE — QUICK NOTE
Lawrence Judge feels well today no complaints  SPT was exchanged 8/30 during this hospitalization and functioning well, urine output is clear  US k/b ordered for monitoring upper urinary tracts in chronically catheterized patient, in light of her recent ALFRED  Can have imaging inpatient or outpatient I will follow results  She will followup for monthly tube changes in our office or with [facility] as scheduled  (Initial dx/tx May 2021, spt originally placed June 2021)  Urology will sign off but remain available for any further inpatient needs  Please feel free to contact the provider currently covering the Urology [facility] role for this campus with questions or concerns      Cherie Goel PA-C  09/01/21  9:54 AM

## 2021-09-01 NOTE — NURSING NOTE
IV and AVS reviewed with patient  Cardiac discharge paperwork reviewed with patient also  DTR came to  patient  Suprapubic cath remains

## 2021-09-01 NOTE — PLAN OF CARE
Problem: Potential for Falls  Goal: Patient will remain free of falls  Description: INTERVENTIONS:  - Educate patient/family on patient safety including physical limitations  - Instruct patient to call for assistance with activity   - Consult OT/PT to assist with strengthening/mobility   - Keep Call bell within reach  - Keep bed low and locked with side rails adjusted as appropriate  - Keep care items and personal belongings within reach  - Initiate and maintain comfort rounds  - Make Fall Risk Sign visible to staff  - Offer Toileting every  Hours, in advance of need  - Initiate/Maintain alarm  - Obtain necessary fall risk management equipment:   - Apply yellow socks and bracelet for high fall risk patients  - Consider moving patient to room near nurses station  Outcome: Progressing     Problem: CARDIOVASCULAR - ADULT  Goal: Maintains optimal cardiac output and hemodynamic stability  Description: INTERVENTIONS:  - Monitor I/O, vital signs and rhythm  - Monitor for S/S and trends of decreased cardiac output  - Administer and titrate ordered vasoactive medications to optimize hemodynamic stability  - Assess quality of pulses, skin color and temperature  - Assess for signs of decreased coronary artery perfusion  - Instruct patient to report change in severity of symptoms  Outcome: Progressing  Goal: Absence of cardiac dysrhythmias or at baseline rhythm  Description: INTERVENTIONS:  - Continuous cardiac monitoring, vital signs, obtain 12 lead EKG if ordered  - Administer antiarrhythmic and heart rate control medications as ordered  - Monitor electrolytes and administer replacement therapy as ordered  Outcome: Progressing     Problem: RESPIRATORY - ADULT  Goal: Achieves optimal ventilation and oxygenation  Description: INTERVENTIONS:  - Assess for changes in respiratory status  - Assess for changes in mentation and behavior  - Position to facilitate oxygenation and minimize respiratory effort  - Oxygen administered by appropriate delivery if ordered  - Initiate smoking cessation education as indicated  - Encourage broncho-pulmonary hygiene including cough, deep breathe, Incentive Spirometry  - Assess the need for suctioning and aspirate as needed  - Assess and instruct to report SOB or any respiratory difficulty  - Respiratory Therapy support as indicated  Outcome: Progressing     Problem: GASTROINTESTINAL - ADULT  Goal: Minimal or absence of nausea and/or vomiting  Description: INTERVENTIONS:  - Administer IV fluids if ordered to ensure adequate hydration  - Maintain NPO status until nausea and vomiting are resolved  - Nasogastric tube if ordered  - Administer ordered antiemetic medications as needed  - Provide nonpharmacologic comfort measures as appropriate  - Advance diet as tolerated, if ordered  - Consider nutrition services referral to assist patient with adequate nutrition and appropriate food choices  Outcome: Progressing  Goal: Maintains or returns to baseline bowel function  Description: INTERVENTIONS:  - Assess bowel function  - Encourage oral fluids to ensure adequate hydration  - Administer IV fluids if ordered to ensure adequate hydration  - Administer ordered medications as needed  - Encourage mobilization and activity  - Consider nutritional services referral to assist patient with adequate nutrition and appropriate food choices  Outcome: Progressing  Goal: Maintains adequate nutritional intake  Description: INTERVENTIONS:  - Monitor percentage of each meal consumed  - Identify factors contributing to decreased intake, treat as appropriate  - Assist with meals as needed  - Monitor I&O, weight, and lab values if indicated  - Obtain nutrition services referral as needed  Outcome: Progressing  Goal: Establish and maintain optimal ostomy function  Description: INTERVENTIONS:  - Assess bowel function  - Encourage oral fluids to ensure adequate hydration  - Administer IV fluids if ordered to ensure adequate hydration - Administer ordered medications as needed  - Encourage mobilization and activity  - Nutrition services referral to assist patient with appropriate food choices  - Assess stoma site  - Consider wound care consult   Outcome: Progressing  Goal: Oral mucous membranes remain intact  Description: INTERVENTIONS  - Assess oral mucosa and hygiene practices  - Implement preventative oral hygiene regimen  - Implement oral medicated treatments as ordered  - Initiate Nutrition services referral as needed  Outcome: Progressing     Problem: GENITOURINARY - ADULT  Goal: Maintains or returns to baseline urinary function  Description: INTERVENTIONS:  - Assess urinary function  - Encourage oral fluids to ensure adequate hydration if ordered  - Administer IV fluids as ordered to ensure adequate hydration  - Administer ordered medications as needed  - Offer frequent toileting  - Follow urinary retention protocol if ordered  Outcome: Progressing  Goal: Absence of urinary retention  Description: INTERVENTIONS:  - Assess patients ability to void and empty bladder  - Monitor I/O  - Bladder scan as needed  - Discuss with physician/AP medications to alleviate retention as needed  - Discuss catheterization for long term situations as appropriate  Outcome: Progressing  Goal: Urinary catheter remains patent  Description: INTERVENTIONS:  - Assess patency of urinary catheter  - If patient has a chronic morales, consider changing catheter if non-functioning  - Follow guidelines for intermittent irrigation of non-functioning urinary catheter  Outcome: Progressing     Problem: METABOLIC, FLUID AND ELECTROLYTES - ADULT  Goal: Electrolytes maintained within normal limits  Description: INTERVENTIONS:  - Monitor labs and assess patient for signs and symptoms of electrolyte imbalances  - Administer electrolyte replacement as ordered  - Monitor response to electrolyte replacements, including repeat lab results as appropriate  - Instruct patient on fluid and nutrition as appropriate  Outcome: Progressing  Goal: Fluid balance maintained  Description: INTERVENTIONS:  - Monitor labs   - Monitor I/O and WT  - Instruct patient on fluid and nutrition as appropriate  - Assess for signs & symptoms of volume excess or deficit  Outcome: Progressing  Goal: Glucose maintained within target range  Description: INTERVENTIONS:  - Monitor Blood Glucose as ordered  - Assess for signs and symptoms of hyperglycemia and hypoglycemia  - Administer ordered medications to maintain glucose within target range  - Assess nutritional intake and initiate nutrition service referral as needed  Outcome: Progressing     Problem: HEMATOLOGIC - ADULT  Goal: Maintains hematologic stability  Description: INTERVENTIONS  - Assess for signs and symptoms of bleeding or hemorrhage  - Monitor labs  - Administer supportive blood products/factors as ordered and appropriate  Outcome: Progressing     Problem: MUSCULOSKELETAL - ADULT  Goal: Maintain or return mobility to safest level of function  Description: INTERVENTIONS:  - Assess patient's ability to carry out ADLs; assess patient's baseline for ADL function and identify physical deficits which impact ability to perform ADLs (bathing, care of mouth/teeth, toileting, grooming, dressing, etc )  - Assess/evaluate cause of self-care deficits   - Assess range of motion  - Assess patient's mobility  - Assess patient's need for assistive devices and provide as appropriate  - Encourage maximum independence but intervene and supervise when necessary  - Involve family in performance of ADLs  - Assess for home care needs following discharge   - Consider OT consult to assist with ADL evaluation and planning for discharge  - Provide patient education as appropriate  Outcome: Progressing  Goal: Maintain proper alignment of affected body part  Description: INTERVENTIONS:  - Support, maintain and protect limb and body alignment  - Provide patient/ family with appropriate education  Outcome: Progressing     Problem: PAIN - ADULT  Goal: Verbalizes/displays adequate comfort level or baseline comfort level  Description: Interventions:  - Encourage patient to monitor pain and request assistance  - Assess pain using appropriate pain scale  - Administer analgesics based on type and severity of pain and evaluate response  - Implement non-pharmacological measures as appropriate and evaluate response  - Consider cultural and social influences on pain and pain management  - Notify physician/advanced practitioner if interventions unsuccessful or patient reports new pain  Outcome: Progressing     Problem: INFECTION - ADULT  Goal: Absence or prevention of progression during hospitalization  Description: INTERVENTIONS:  - Assess and monitor for signs and symptoms of infection  - Monitor lab/diagnostic results  - Monitor all insertion sites, i e  indwelling lines, tubes, and drains  - Monitor endotracheal if appropriate and nasal secretions for changes in amount and color  - [place] appropriate cooling/warming therapies per order  - Administer medications as ordered  - Instruct and encourage patient and family to use good hand hygiene technique  - Identify and instruct in appropriate isolation precautions for identified infection/condition  Outcome: Progressing  Goal: Absence of fever/infection during neutropenic period  Description: INTERVENTIONS:  - Monitor WBC    Outcome: Progressing     Problem: SAFETY ADULT  Goal: Patient will remain free of falls  Description: INTERVENTIONS:  - Educate patient/family on patient safety including physical limitations  - Instruct patient to call for assistance with activity   - Consult OT/PT to assist with strengthening/mobility   - Keep Call bell within reach  - Keep bed low and locked with side rails adjusted as appropriate  - Keep care items and personal belongings within reach  - Initiate and maintain comfort rounds  - Make Fall Risk Sign visible to staff  - Offer Toileting every  Hours, in advance of need  - Initiate/Maintain alarm  - Obtain necessary fall risk management equipment:   - Apply yellow socks and bracelet for high fall risk patients  - Consider moving patient to room near nurses station  Outcome: Progressing  Goal: Maintain or return to baseline ADL function  Description: INTERVENTIONS:  -  Assess patient's ability to carry out ADLs; assess patient's baseline for ADL function and identify physical deficits which impact ability to perform ADLs (bathing, care of mouth/teeth, toileting, grooming, dressing, etc )  - Assess/evaluate cause of self-care deficits   - Assess range of motion  - Assess patient's mobility; develop plan if impaired  - Assess patient's need for assistive devices and provide as appropriate  - Encourage maximum independence but intervene and supervise when necessary  - Involve family in performance of ADLs  - Assess for home care needs following discharge   - Consider OT consult to assist with ADL evaluation and planning for discharge  - Provide patient education as appropriate  Outcome: Progressing  Goal: Maintains/Returns to pre admission functional level  Description: INTERVENTIONS:  - Perform BMAT or MOVE assessment daily    - Set and communicate daily mobility goal to care team and patient/family/caregiver  - Collaborate with rehabilitation services on mobility goals if consulted  - Perform Range of Motion  times a day  - Reposition patient every  hours    - Dangle patient  times a day  - Stand patient  times a day  - Ambulate patient  times a day  - Out of bed to chair  times a day   - Out of bed for meals  times a day  - Out of bed for toileting  - Record patient progress and toleration of activity level   Outcome: Progressing     Problem: DISCHARGE PLANNING  Goal: Discharge to home or other facility with appropriate resources  Description: INTERVENTIONS:  - Identify barriers to discharge w/patient and caregiver  - Arrange for needed discharge resources and transportation as appropriate  - Identify discharge learning needs (meds, wound care, etc )  - Arrange for interpretive services to assist at discharge as needed  - Refer to Case Management Department for coordinating discharge planning if the patient needs post-hospital services based on physician/advanced practitioner order or complex needs related to functional status, cognitive ability, or social support system  Outcome: Progressing     Problem: Knowledge Deficit  Goal: Patient/family/caregiver demonstrates understanding of disease process, treatment plan, medications, and discharge instructions  Description: Complete learning assessment and assess knowledge base  Interventions:  - Provide teaching at level of understanding  - Provide teaching via preferred learning methods  Outcome: Progressing     Problem: MOBILITY - ADULT  Goal: Maintain or return to baseline ADL function  Description: INTERVENTIONS:  -  Assess patient's ability to carry out ADLs; assess patient's baseline for ADL function and identify physical deficits which impact ability to perform ADLs (bathing, care of mouth/teeth, toileting, grooming, dressing, etc )  - Assess/evaluate cause of self-care deficits   - Assess range of motion  - Assess patient's mobility; develop plan if impaired  - Assess patient's need for assistive devices and provide as appropriate  - Encourage maximum independence but intervene and supervise when necessary  - Involve family in performance of ADLs  - Assess for home care needs following discharge   - Consider OT consult to assist with ADL evaluation and planning for discharge  - Provide patient education as appropriate  Outcome: Progressing  Goal: Maintains/Returns to pre admission functional level  Description: INTERVENTIONS:  - Perform BMAT or MOVE assessment daily    - Set and communicate daily mobility goal to care team and patient/family/caregiver     - Collaborate with rehabilitation services on mobility goals if consulted  - Perform Range of Motion times a day  - Reposition patient every  hours    - Dangle patient  times a day  - Stand patient  times a day  - Ambulate patient  times a day  - Out of bed to chair  times a day   - Out of bed for meals  times a day  - Out of bed for toileting  - Record patient progress and toleration of activity level   Outcome: Progressing

## 2021-09-01 NOTE — ASSESSMENT & PLAN NOTE
Lab Results   Component Value Date    EGFR 30 09/01/2021    EGFR 25 08/31/2021    EGFR 20 08/30/2021    CREATININE 1 82 (H) 09/01/2021    CREATININE 2 13 (H) 08/31/2021    CREATININE 2 60 (H) 08/30/2021     · History of CKD stage 4   · Nephrology following, Cr stable post cath   · Repeat BMP in 2-3 days with follow up with Dr Juan Deleon outpatient   · Okay to resume torsemide 20 mg bid

## 2021-09-01 NOTE — QUICK NOTE
Okay for discharge from renal perspective  Patient's serum creatinine remains below baseline  Patient should have outpatient follow-up with Dr Claudette Perdue upon discharge with repeat BMP in 2-3 days  Continue holding diuretics

## 2021-09-02 ENCOUNTER — PATIENT OUTREACH (OUTPATIENT)
Dept: FAMILY MEDICINE CLINIC | Facility: CLINIC | Age: 61
End: 2021-09-02

## 2021-09-02 NOTE — PROGRESS NOTES
I called the patient to follow up after her recent hospitalization  She states she is "better, not as tired"  She notes it has been "chaotic" with so much that has happened over the past few days  I explained to the patient she will need to start checking her weight daily; I will provide her with a scale at her next appointment  I informed her she will need to keep track of her weight and if she sees an increase of 3 lbs in a day or 5 lbs in a week she is to call the Cardiologist   I reminded her to keep a strict watch on her sodium and she states she does not use much salt  I will provide further low sodium information  The patient is unsure if she will proceed with her trip to [place] at the end of the month  I suggested she ask her PCP/specialists if she would be cleared and to also consider the increasing Covid numbers  The patient will be due for a TCM; will send note to clerical team   The patient is aware to call if she has any questions or concerns  Chart reviewed

## 2021-09-04 DIAGNOSIS — F41.9 INSOMNIA SECONDARY TO ANXIETY: ICD-10-CM

## 2021-09-04 DIAGNOSIS — F51.05 INSOMNIA SECONDARY TO ANXIETY: ICD-10-CM

## 2021-09-07 RX ORDER — OLANZAPINE 5 MG/1
TABLET ORAL
Qty: 90 TABLET | Refills: 0 | Status: SHIPPED | OUTPATIENT
Start: 2021-09-07 | End: 2021-09-28

## 2021-09-08 ENCOUNTER — TELEPHONE (OUTPATIENT)
Dept: FAMILY MEDICINE CLINIC | Facility: CLINIC | Age: 61
End: 2021-09-08

## 2021-09-08 ENCOUNTER — PATIENT OUTREACH (OUTPATIENT)
Dept: FAMILY MEDICINE CLINIC | Facility: CLINIC | Age: 61
End: 2021-09-08

## 2021-09-08 ENCOUNTER — TELEPHONE (OUTPATIENT)
Dept: LAB | Facility: HOSPITAL | Age: 61
End: 2021-09-08

## 2021-09-08 DIAGNOSIS — N18.4 CKD (CHRONIC KIDNEY DISEASE) STAGE 4, GFR 15-29 ML/MIN (HCC): Primary | ICD-10-CM

## 2021-09-08 NOTE — TELEPHONE ENCOUNTER
[place] visiting nurse was wondering if pt could continue vitamins that she was taking prior to hospital admission verbal performed with PCP and informed it is okay to continue with vitamins at this time will also address at upcoming appt on 9/10 with Tim Valverde PA-C

## 2021-09-08 NOTE — PROGRESS NOTES
Spoke with patient and made her aware of repeat BMP needed for HFU on 9/14  Patient will complete prior to apt

## 2021-09-08 NOTE — PROGRESS NOTES
The patient called stating she cannot locate the placard  I provided her with [employer]'s phone number to call and ask for the placard number so we can proceed in applying for a replacement  The patient will call back when she obtains the number

## 2021-09-08 NOTE — PROGRESS NOTES
I called the patient to follow up  She states she notices a "marked difference" in how she feels post-catheterization  She notes "I can breathe better"  The patient states she is receiving home PT twice a week which is helping  The patient reports her blood sugars were elevated yesterday (288) because she ate "the wrong foods"  She admitted to eating fast food  The patient did state otherwise her sugars have been stable: 116/154/103  I informed her fast food is very high in sodium and carbs  She states she will get back on track and is eager to start weighing herself daily  I informed her I have a scale/log sheets for her that she can get at her appointment on Friday, 9/10  She is asking to pick it up tomorrow because she wants to start asap; spoke with Kathleen Lorenzana who will provide the patient with the scale  The patient reports minimal edema currently and notes when she does become swollen, her left side (hand/leg) is always more than her right side  The patient notes she decided to go to [place] at the end of the month for a week  I stressed to her not to indulge while on vacation as well as it being her birthday  She states she will be careful with her food choices because she wants to be healthy in order to have eye surgery  I offered supportive listening and encouragement  The patient states her placard became wet and is unsure if she knows where it is; she will search for it and let me know if she needs a replacement  The patient would also like the shingles vaccine order so she can obtain it at a pharmacy  I will be calling the patient next week per her request to remind her of an upcoming appointment

## 2021-09-09 NOTE — PROGRESS NOTES
I will put in order for shingrix when she comes in   Since she is new to me I want to make sure no contraindications

## 2021-09-10 ENCOUNTER — TELEPHONE (OUTPATIENT)
Dept: FAMILY MEDICINE CLINIC | Facility: CLINIC | Age: 61
End: 2021-09-10

## 2021-09-10 ENCOUNTER — OFFICE VISIT (OUTPATIENT)
Dept: FAMILY MEDICINE CLINIC | Facility: CLINIC | Age: 61
End: 2021-09-10

## 2021-09-10 VITALS
RESPIRATION RATE: 18 BRPM | WEIGHT: 293 LBS | HEART RATE: 81 BPM | SYSTOLIC BLOOD PRESSURE: 140 MMHG | DIASTOLIC BLOOD PRESSURE: 62 MMHG | BODY MASS INDEX: 44.41 KG/M2 | OXYGEN SATURATION: 97 % | TEMPERATURE: 97.2 F | HEIGHT: 68 IN

## 2021-09-10 DIAGNOSIS — Z23 NEED FOR SHINGLES VACCINE: ICD-10-CM

## 2021-09-10 DIAGNOSIS — M17.12 PRIMARY OSTEOARTHRITIS OF LEFT KNEE: ICD-10-CM

## 2021-09-10 DIAGNOSIS — Z98.1 S/P CERVICAL SPINAL FUSION: ICD-10-CM

## 2021-09-10 DIAGNOSIS — N18.4 CKD (CHRONIC KIDNEY DISEASE) STAGE 4, GFR 15-29 ML/MIN (HCC): ICD-10-CM

## 2021-09-10 DIAGNOSIS — L30.4 INTERTRIGO: Primary | ICD-10-CM

## 2021-09-10 DIAGNOSIS — E11.42 TYPE 2 DIABETES MELLITUS WITH DIABETIC POLYNEUROPATHY, WITH LONG-TERM CURRENT USE OF INSULIN (HCC): ICD-10-CM

## 2021-09-10 DIAGNOSIS — I50.9 NEW ONSET OF CONGESTIVE HEART FAILURE (HCC): ICD-10-CM

## 2021-09-10 DIAGNOSIS — L98.491 SKIN ULCER, LIMITED TO BREAKDOWN OF SKIN (HCC): ICD-10-CM

## 2021-09-10 DIAGNOSIS — Z79.4 TYPE 2 DIABETES MELLITUS WITH DIABETIC POLYNEUROPATHY, WITH LONG-TERM CURRENT USE OF INSULIN (HCC): ICD-10-CM

## 2021-09-10 PROCEDURE — 99215 OFFICE O/P EST HI 40 MIN: CPT | Performed by: PHYSICIAN ASSISTANT

## 2021-09-10 RX ORDER — HYDROCODONE BITARTRATE AND ACETAMINOPHEN 5; 325 MG/1; MG/1
1 TABLET ORAL 2 TIMES DAILY PRN
Qty: 60 TABLET | Refills: 0 | Status: SHIPPED | OUTPATIENT
Start: 2021-09-10 | End: 2021-10-13

## 2021-09-10 RX ORDER — ZOSTER VACCINE RECOMBINANT, ADJUVANTED 50 MCG/0.5
0.5 KIT INTRAMUSCULAR ONCE
Qty: 1 EACH | Refills: 1 | Status: SHIPPED | OUTPATIENT
Start: 2021-09-10 | End: 2021-09-10

## 2021-09-10 RX ORDER — NYSTATIN 100000 [USP'U]/G
POWDER TOPICAL 2 TIMES DAILY
Qty: 30 G | Refills: 1 | Status: SHIPPED | OUTPATIENT
Start: 2021-09-10

## 2021-09-10 NOTE — PROGRESS NOTES
Assessment/Plan:    No problem-specific Assessment & Plan notes found for this encounter  Problem List Items Addressed This Visit     None          I have spent 50 minutes with Patient and family today in which greater than 50% of this time was spent in counseling/coordination of care regarding Diagnostic results, Prognosis, Risks and benefits of tx options and Intructions for management  Subjective:      Patient ID: Venkata Hernandez is a 64 y o  female  HPI 64year old F here for new onset combo sytolic/diastolic CHF  She went to ED with SOB and weight gain  She was diuresed with IV lasix and switched to po torsemide 20BID  She was recommended to continue curent medicaitons  She also underwent cath on 8/31 and had 100% occlusion of proximal RCA  She had ECHO showing EF of 4% with mild LVH and grade 2 diastolic dysfunction  She did not have a scale  She is having limited sodium in her diet and limits fluid intake  She has noticed some mild swelling since she got home  She is getting more swelling in left than right  Daughter has been helping with massage and also with elevation  She needs to get a recliner for CHF  She did take [id number]  BID  She has not been doing it based on weight  She is still getting SOB but can get it under control  It is usually when walking more than normal like if she leaves the jennifer  She relates it more to in activity  She used to have CPAP machine but now does not   She is sleeping on 2 pillows shich has been consistent  She also has a rash on right boby eof butt since she was in the hospital        A1C yesterday was 6 1  VNA is coming 2 times a week  She has a rash under belly skin and also needs something to hold her cath in place  she has suprapubic cath since June  She also needs to get a hospital bed  She would like to get a recliner  Also found to have UTI and put on IV ceftriaxone  She has CKD stage 4 as well and needed to repeaet BMP now  Baseline creatinine 2 2-2 6      She is starting to get a sore on her back   TCM Call (since 8/10/2021)     None      TCM Call (since 8/10/2021)     None          The following portions of the patient's history were reviewed and updated as appropriate: She  has a past medical history of Abnormal liver function, Anemia, Anxiety, Arthritis, Chronic kidney disease, Chronic narcotic dependence ([place] [address] ), Chronic pain disorder, Coronary artery disease, Depression, Diabetes mellitus ([place] [address] ), GERD (gastroesophageal reflux disease), Heart murmur, Hyperlipidemia, Hypertension, Neurogenic bladder, Open toe wound (12/2020), Renal disorder, Shortness of breath, Sleep apnea, and Suprapubic catheter ([place] [address] )    She   Patient Active Problem List    Diagnosis Date Noted    New onset of congestive heart failure ([place] [address] ) 08/24/2021    Hypocalcemia 08/24/2021    Prolonged QT interval 08/24/2021    Urinary tract infection associated with cystostomy catheter ([place] [address] ) 08/23/2021    Neurogenic bladder 08/23/2021    ARF (acute renal failure) ([place] [address] ) 06/23/2021    Morbid obesity with BMI of 45 0-49 9, adult ([place] [address] ) 06/15/2021    History of heart artery stent 06/15/2021    CKD (chronic kidney disease) stage 4, GFR 15-29 ml/min ([place] [address] ) 06/04/2021    Memory impairment 05/26/2021    Chronic bronchitis ([place] [address] ) 05/25/2021    Severe episode of recurrent major depressive disorder, without psychotic features ([place] [address] ) 05/25/2021    Skin ulcer of hand, limited to breakdown of skin ([place] [address] ) 02/25/2021    HTN (hypertension) 12/21/2020    Diabetic ulcer of toe of right foot associated with type 2 diabetes mellitus, with muscle involvement without evidence of necrosis ([place] [address] ) 11/25/2020    Head lump 11/11/2020    Need for follow-up by  11/11/2020    Normochromic normocytic anemia 09/09/2020    CKD (chronic kidney disease) stage 3, GFR 30-59 ml/min ([facility]) 09/09/2020    Abnormal LFTs 09/09/2020    S/P cervical spinal fusion 09/09/2020    Major depressive disorder 09/02/2020    Type 2 diabetes mellitus with diabetic polyneuropathy, with long-term current use of insulin ([facility] [address] ) 09/02/2020    Anxiety 09/02/2020    CAD (coronary artery disease) 09/02/2020    Osteoarthritis of left knee 09/02/2020    Healthcare maintenance 09/02/2020    Cellulitis of finger of right hand 08/26/2020     She  has a past surgical history that includes Knee surgery; Carpal tunnel release (Bilateral); Trigger finger release (Right); Toe amputation (Left); Hysterectomy (2008); Oophorectomy (2008); Breast excisional biopsy (Left); Amputation; Breast surgery; Cardiac catheterization; Cervical fusion; Angioplasty (2017); pr exc skin benig 3 1-4 cm remaindr body (N/A, 12/21/2020); and IR suprapubic catheter placement (6/15/2021)  Her family history includes Heart disease in her father; No Known Problems in her daughter, maternal aunt, maternal aunt, maternal aunt, maternal aunt, maternal aunt, maternal aunt, maternal grandfather, maternal grandmother, mother, paternal aunt, paternal grandfather, paternal grandmother, and sister; Stroke in her father  She  reports that she quit smoking about 9 years ago  Her smoking use included cigarettes  She has a 35 00 pack-year smoking history  She has never used smokeless tobacco  She reports previous alcohol use  She reports that she does not use drugs    Current Outpatient Medications   Medication Sig Dispense Refill    allopurinol (ZYLOPRIM) 300 mg tablet Take 1 tablet (300 mg total) by mouth daily 90 tablet 1    amLODIPine (NORVASC) 5 mg tablet TAKE 1 TABLET BY MOUTH EVERY DAY 30 tablet 0    Aspirin Low Dose 81 MG EC tablet TAKE 1 TABLET (81 MG TOTAL) BY MOUTH ONCE FOR 1 DOSE 90 tablet 0    atorvastatin (LIPITOR) 40 mg tablet TAKE 1 TABLET BY MOUTH EVERY DAY 90 tablet 0    carvedilol (COREG) 25 mg tablet TAKE 1 TABLET BY MOUTH TWICE A DAY WITH FOOD 60 tablet 0    citalopram (CeleXA) 40 mg tablet Take 1 tablet (40 mg total) by mouth daily 30 tablet 2    clopidogrel (PLAVIX) 75 mg tablet TAKE 1 TABLET BY MOUTH EVERY DAY 90 tablet 1    Continuous Blood Gluc  (FreeStyle [place] 2 [place]) YOUNG Use as directed 1 each 0    Continuous Blood Gluc Sensor (FreeStyle [place] 14 Day Sensor) MISC USE 1 SENSOR EVERY 2 WEEKS 2 each 3    diphenhydrAMINE (BENADRYL) 25 mg capsule Take 25 mg by mouth every 4 (four) hours as needed for allergies or sleep       docusate sodium (COLACE) 100 mg capsule Take 1 capsule (100 mg total) by mouth 2 (two) times a day 10 capsule 0    Dulaglutide 1 5 MG/0 5ML SOPN Inject 0 5 mL (1 5 mg total) under the skin once a week 4 pen 3    ferrous sulfate 325 (65 Fe) mg tablet Take 1 tablet (325 mg total) by mouth every other day 30 tablet 0    gabapentin (NEURONTIN) 600 MG tablet TAKE 1 TABLET (600 MG TOTAL) BY MOUTH 4 (FOUR) TIMES A  tablet 2    glucose blood (OneTouch Ultra) test strip Use 1 each daily Use as instructed 100 each 2    HYDROcodone-acetaminophen (NORCO) 5-325 mg per tablet Take 1 tablet by mouth 2 (two) times a day as needed for painMax Daily Amount: 2 tablets 60 tablet 0    insulin glargine (Lantus SoloStar) 100 units/mL injection pen Inject 50 Units under the skin every 12 (twelve) hours 30 mL 1    insulin lispro (HumaLOG KwikPen) 100 units/mL injection pen Inject 10 Units under the skin 3 (three) times a day with meals If blood glucose >150 15 mL 5    Insulin Pen Needle (BD Pen Needle Micro U/F) 32G X 6 MM MISC Use 3 (three) times a day 100 each 5    Insulin Syringe-Needle U-100 (BD Veo Insulin Syringe U/F) 31G X 15/64" 0 5 ML MISC Inject under the skin 3 (three) times a day 100 each 2    isosorbide mononitrate (IMDUR) 30 mg 24 hr tablet Take 1 tablet (30 mg total) by mouth daily 30 tablet 6    Lancets (OneTouch Delica Plus [id number]) MISC USE TO TEST 3 TIMES DAILY 100 each 2    levothyroxine 50 mcg tablet TAKE 1 TABLET BY MOUTH EVERY DAY 60 tablet 0    nitroglycerin (NITROSTAT) 0 4 mg SL tablet Place 1 tablet (0 4 mg total) under the tongue every 5 (five) minutes as needed for chest pain 25 tablet 1    OLANZapine (ZyPREXA) 5 mg tablet TAKE 1 TABLET BY MOUTH EVERYDAY AT BEDTIME 90 tablet 0    oxybutynin (DITROPAN-XL) 10 MG 24 hr tablet TAKE 1 TABLET BY MOUTH DAILY AT BEDTIME 90 tablet 1    torsemide (DEMADEX) 20 mg tablet Take 1 tablet (20 mg total) by mouth daily as needed (for weight gain of 3 lbs in one day or 5 lbs over 3 days, increased leg swelling, shortness of breath) 60 tablet 0    Diclofenac Sodium (VOLTAREN) 1 % Apply 2 g topically 4 (four) times a day (Patient not taking: Reported on 9/10/2021) 100 g 1    naloxone (NARCAN) 4 mg/0 1 mL nasal spray Administer 1 spray into a nostril  If breathing does not return to normal or if breathing difficulty resumes after 2-3 minutes, give another dose in the other nostril using a new spray  (Patient not taking: Reported on 7/13/2021) 1 each 1    silver sulfadiazine (SILVADENE,SSD) 1 % cream Apply topically daily To burns on fingers, cover w/band-aid  (Patient not taking: Reported on 8/3/2021) 50 g 0     No current facility-administered medications for this visit       Current Outpatient Medications on File Prior to Visit   Medication Sig    allopurinol (ZYLOPRIM) 300 mg tablet Take 1 tablet (300 mg total) by mouth daily    amLODIPine (NORVASC) 5 mg tablet TAKE 1 TABLET BY MOUTH EVERY DAY    Aspirin Low Dose 81 MG EC tablet TAKE 1 TABLET (81 MG TOTAL) BY MOUTH ONCE FOR 1 DOSE    atorvastatin (LIPITOR) 40 mg tablet TAKE 1 TABLET BY MOUTH EVERY DAY    carvedilol (COREG) 25 mg tablet TAKE 1 TABLET BY MOUTH TWICE A DAY WITH FOOD    citalopram (CeleXA) 40 mg tablet Take 1 tablet (40 mg total) by mouth daily    clopidogrel (PLAVIX) 75 mg tablet TAKE 1 TABLET BY MOUTH EVERY DAY    Continuous Blood Gluc  (FreeStyle [place] 2 [place]) YOUNG Use as directed    Continuous Blood Gluc Sensor (AudioEyeStyle [place] 14 Day Sensor) [facility] [place] 1 SENSOR EVERY 2 WEEKS    diphenhydrAMINE (BENADRYL) 25 mg capsule Take 25 mg by mouth every 4 (four) hours as needed for allergies or sleep     docusate sodium (COLACE) 100 mg capsule Take 1 capsule (100 mg total) by mouth 2 (two) times a day    Dulaglutide 1 5 MG/0 5ML SOPN Inject 0 5 mL (1 5 mg total) under the skin once a week    ferrous sulfate 325 (65 Fe) mg tablet Take 1 tablet (325 mg total) by mouth every other day    gabapentin (NEURONTIN) 600 MG tablet TAKE 1 TABLET (600 MG TOTAL) BY MOUTH 4 (FOUR) TIMES A DAY    glucose blood (OneTouch Ultra) test strip Use 1 each daily Use as instructed    HYDROcodone-acetaminophen (NORCO) 5-325 mg per tablet Take 1 tablet by mouth 2 (two) times a day as needed for painMax Daily Amount: 2 tablets    insulin glargine (Lantus SoloStar) 100 units/mL injection pen Inject 50 Units under the skin every 12 (twelve) hours    insulin lispro (HumaLOG KwikPen) 100 units/mL injection pen Inject 10 Units under the skin 3 (three) times a day with meals If blood glucose >150    Insulin Pen Needle (BD Pen Needle Micro U/F) 32G X 6 MM MISC Use 3 (three) times a day    Insulin Syringe-Needle U-100 (BD Veo Insulin Syringe U/F) 31G X 15/64" 0 5 ML MISC Inject under the skin 3 (three) times a day    isosorbide mononitrate (IMDUR) 30 mg 24 hr tablet Take 1 tablet (30 mg total) by mouth daily    Lancets (OneTouch Delica Plus [id number]) MISC USE TO TEST 3 TIMES DAILY    levothyroxine 50 mcg tablet TAKE 1 TABLET BY MOUTH EVERY DAY    nitroglycerin (NITROSTAT) 0 4 mg SL tablet Place 1 tablet (0 4 mg total) under the tongue every 5 (five) minutes as needed for chest pain    OLANZapine (ZyPREXA) 5 mg tablet TAKE 1 TABLET BY MOUTH EVERYDAY AT BEDTIME    oxybutynin (DITROPAN-XL) 10 MG 24 hr tablet TAKE 1 TABLET BY MOUTH DAILY AT BEDTIME    torsemide (DEMADEX) 20 mg tablet Take 1 tablet (20 mg total) by mouth daily as needed (for weight gain of 3 lbs in one day or 5 lbs over 3 days, increased leg swelling, shortness of breath)    Diclofenac Sodium (VOLTAREN) 1 % Apply 2 g topically 4 (four) times a day (Patient not taking: Reported on 9/10/2021)    naloxone (NARCAN) 4 mg/0 1 mL nasal spray Administer 1 spray into a nostril  If breathing does not return to normal or if breathing difficulty resumes after 2-3 minutes, give another dose in the other nostril using a new spray  (Patient not taking: Reported on 7/13/2021)    silver sulfadiazine (SILVADENE,SSD) 1 % cream Apply topically daily To burns on fingers, cover w/band-aid  (Patient not taking: Reported on 8/3/2021)     No current facility-administered medications on file prior to visit  She is allergic to codeine and latex       Review of Systems   Constitutional: Positive for unexpected weight change  Negative for chills and fever  HENT: Negative for ear pain and sore throat  Eyes: Negative for pain and visual disturbance  Respiratory: Positive for shortness of breath  Negative for cough  Cardiovascular: Positive for leg swelling  Negative for chest pain and palpitations  Gastrointestinal: Negative for abdominal pain and vomiting  Genitourinary: Negative for dysuria and hematuria  Musculoskeletal: Negative for arthralgias and back pain  Skin: Positive for rash  Negative for color change  Neurological: Positive for dizziness  Negative for seizures and syncope  All other systems reviewed and are negative  Objective:      /62 (BP Location: Left arm, Patient Position: Sitting, Cuff Size: Standard)   Pulse 81   Temp (!) 97 2 °F (36 2 °C) (Temporal)   Resp 18   Ht 5' 8" (1 727 m)   Wt 134 kg (294 lb 6 4 oz)   SpO2 97%   BMI 44 76 kg/m²          Physical Exam  Vitals and nursing note reviewed  Constitutional:       Appearance: She is well-developed  She is obese  HENT:      Head: Normocephalic and atraumatic        Right Ear: External ear normal       Left Ear: External ear normal       Nose: Nose normal    Eyes:      Conjunctiva/sclera: Conjunctivae normal    Neck:      Thyroid: No thyromegaly  Cardiovascular:      Rate and Rhythm: Normal rate and regular rhythm  Heart sounds: Normal heart sounds  No murmur heard  Pulmonary:      Effort: Pulmonary effort is normal  No respiratory distress  Breath sounds: Normal breath sounds  Comments: +3 pitting edema to below knees    Abdominal:      General: Bowel sounds are normal       Palpations: Abdomen is soft  There is no mass  Tenderness: There is no abdominal tenderness  There is no guarding  Musculoskeletal:         General: Normal range of motion  Cervical back: Normal range of motion and neck supple  Lymphadenopathy:      Cervical: No cervical adenopathy  Skin:     General: Skin is warm  Findings: Rash (right gluttus red patch with 1 macule) present  Neurological:      Mental Status: She is alert and oriented to person, place, and time     Psychiatric:         Mood and Affect: Mood normal          Behavior: Behavior normal

## 2021-09-10 NOTE — PATIENT INSTRUCTIONS
Heart Failure   WHAT YOU NEED TO KNOW:   Heart failure is a condition that does not allow your heart to fill or pump properly  Not enough oxygen in your blood gets to your organs and tissues  Fluid may not move through your body properly  Fluid builds up and causes swelling and trouble breathing  This is known as congestive heart failure  Heart failure may start in the left or right ventricle  Heart failure is often caused by damage or injury to your heart  The damage may be caused by other heart problems, diabetes, or high blood pressure  The damage may have also been caused by an infection  Heart failure is a long-term condition that tends to get worse over time  It is important to manage your health to improve your quality of life  DISCHARGE INSTRUCTIONS:   Call your local emergency number (911 in the [address] [facility]) if:   · You have any of the following signs of a heart attack:      ? Squeezing, pressure, or pain in your chest    ? You may  also have any of the following:     § Discomfort or pain in your back, neck, jaw, stomach, or arm    § Shortness of breath    § Nausea or vomiting    § Lightheadedness or a sudden cold sweat      Call your doctor if:   · Your heartbeat is fast, slow, or uneven all the time  · You have symptoms of worsening heart failure:      ? Shortness of breath at rest, at night, or that is getting worse in any way    ? Weight gain of 3 or more pounds (1 4 kg) in a day, or more than your healthcare provider says is okay    ? More swelling in your legs or ankles    ? Abdominal pain or swelling    ? More coughing    ? Loss of appetite    ? Feeling tired all the time    · You feel hopeless or depressed, or you have lost interest in things you used to enjoy  · You often feel worried or afraid  · You have questions or concerns about your condition or care  Medicines:   · Medicines  may be given to help regulate your heart rhythm and lower your blood pressure   You may also need medicines to help decrease extra fluids  Medicines, such as NSAIDs, may be stopped if they are causing your heart failure to become worse  Do not stop any of your medicines on your own  · Take your medicine as directed  Contact your healthcare provider if you think your medicine is not helping or if you have side effects  Tell him or her if you are allergic to any medicine  Keep a list of the medicines, vitamins, and herbs you take  Include the amounts, and when and why you take them  Bring the list or the pill bottles to follow-up visits  Carry your medicine list with you in case of an emergency  Go to cardiac rehab if directed:  Cardiac rehab is a program run by specialists who will help you safely strengthen your heart  In the program you will learn about exercise, relaxation, stress management, and heart-healthy nutrition  Manage swelling from extra fluid:   · Elevate (raise) your legs above the level of your heart  This will help with fluid that builds up in your legs or ankles  Elevate your legs as often as possible during the day  Prop your legs on pillows or blankets to keep them elevated comfortably  Try not to stand for long periods of time during the day  Move around to keep your blood circulating  · Limit sodium (salt)  Ask how much sodium you can have each day  Your healthcare provider may give you a limit, such as 2,300 milligrams (mg) a day  Your provider or a dietitian can teach you how to read food labels for the number of mg in a food  He or she can also help you find ways to have less salt  For example, if you add salt to food as you cook, do not add more at the table  · Drink liquids as directed  You may need to limit the amount of liquid you drink within 24 hours  Your healthcare provider will tell you how much liquid to have and which liquids are best for you  He or she may tell you to limit liquid to 1 5 to 2 liters in a day   He or she will also tell you how often to drink liquid throughout the day  · Weigh yourself every morning  Use the same scale, in the same spot  Do this after you use the bathroom, but before you eat or drink  Wear the same type of clothing each time  Write down your weight and call your healthcare provider if you have a sudden weight gain  Swelling and weight gain are signs of fluid buildup  Manage heart failure: Your quality of life may improve with treatment and the following:  · Do not smoke  Nicotine and other chemicals in cigarettes and cigars can cause lung and heart damage  Ask your healthcare provider for information if you currently smoke and need help to quit  E-cigarettes or smokeless tobacco still contain nicotine  Talk to your healthcare provider before you use these products  · Do not drink alcohol or use illegal drugs  Alcohol and drugs can increase your risk for high blood pressure, diabetes, and coronary artery disease  · Eat heart-healthy foods  Heart-healthy foods include fruits, vegetables, lean meat (such as beef, chicken, or pork), and low-fat dairy products  Fatty fish such as salmon and tuna are also heart healthy  Other heart-healthy foods include walnuts, whole-grain breads, beans, and cooked beans  Replace butter and margarine with heart-healthy oils such as olive oil or canola oil  Your provider or a dietitian can help you create heart-healthy meal plans  · Manage any chronic health conditions you have  These include high blood pressure, diabetes, obesity, high cholesterol, metabolic syndrome, and COPD  You will have fewer symptoms if you manage these health conditions  Follow your healthcare provider's recommendations and follow up with him or her regularly  · Maintain a healthy weight  Being overweight can increase your risk for high blood pressure, diabetes, and coronary artery disease  These conditions can make your symptoms worse  Ask your healthcare provider how much you should weigh   Ask him or her to help you create a weight loss plan if you are overweight  · Stay active  Activity can help keep your symptoms from getting worse  Walking is a type of physical activity that helps maintain your strength and improve your mood  Physical activity also helps you manage your weight  Work with your healthcare provider to create an exercise plan that is right for you  · Get vaccines as directed  The flu and pneumonia can be severe for a person who has heart failure  Vaccines protect you from these infections  Get a flu shot every year as soon as it is recommended, usually in September or October  You may also need the pneumonia vaccine  Your healthcare provider can tell you if you need other vaccines, and when to get them  Follow up with your doctor within 7 days and as directed: You may need to return for other tests  You may need home health care  A healthcare provider will monitor your vital signs, weight, and make sure your medicines are working  Write down your questions so you remember to ask them during your visits  Join a support group:  Heart failure can be difficult to manage  It may be helpful to talk with others who have heart failure  You may learn how to better manage your condition or get emotional support  For more information:  · [address]  [place] , [place]   Phone: [phone]  Web Address: https://www strong [place] [employer]/  [address] 2021 Information is for End User's use only and may not be sold, redistributed or otherwise used for commercial purposes  All illustrations and images included in CareNotes® are the copyrighted property of A D A M , Inc  or [facility] [employer]   The above information is an  only  It is not intended as medical advice for individual conditions or treatments   Talk to your doctor, nurse or pharmacist before following any medical regimen to see if it is safe and effective for you

## 2021-09-11 DIAGNOSIS — I25.10 CORONARY ARTERY DISEASE INVOLVING NATIVE HEART WITHOUT ANGINA PECTORIS, UNSPECIFIED VESSEL OR LESION TYPE: ICD-10-CM

## 2021-09-11 DIAGNOSIS — Z79.4 TYPE 2 DIABETES MELLITUS WITH DIABETIC POLYNEUROPATHY, WITH LONG-TERM CURRENT USE OF INSULIN (HCC): ICD-10-CM

## 2021-09-11 DIAGNOSIS — Z79.4 CURRENT USE OF INSULIN (HCC): ICD-10-CM

## 2021-09-11 DIAGNOSIS — N18.32 STAGE 3B CHRONIC KIDNEY DISEASE (HCC): ICD-10-CM

## 2021-09-11 DIAGNOSIS — E11.42 TYPE 2 DIABETES MELLITUS WITH DIABETIC POLYNEUROPATHY, WITH LONG-TERM CURRENT USE OF INSULIN (HCC): ICD-10-CM

## 2021-09-11 NOTE — ASSESSMENT & PLAN NOTE
Wt Readings from Last 3 Encounters:   09/10/21 134 kg (294 lb 6 4 oz)   09/01/21 127 kg (279 lb 12 2 oz)   08/27/21 133 kg (292 lb 15 9 oz)         She has had significant weight gain since hospitalization  Per patient though edema is not as bad nor is beathing  Recommend she get BMP done asap and continue with toresemide BID  Discussed that if any symptoms get worse to go to ED   She just got her scale today and will start weighing herself   Continue low sodium diet

## 2021-09-11 NOTE — ASSESSMENT & PLAN NOTE
Lab Results   Component Value Date    EGFR 30 09/01/2021    EGFR 25 08/31/2021    EGFR 20 08/30/2021    CREATININE 1 82 (H) 09/01/2021    CREATININE 2 13 (H) 08/31/2021    CREATININE 2 60 (H) 08/30/2021   due to renal function to keep torsemide dose the same    She is going to try to get bmp done tomorrow

## 2021-09-13 ENCOUNTER — PATIENT OUTREACH (OUTPATIENT)
Dept: FAMILY MEDICINE CLINIC | Facility: CLINIC | Age: 61
End: 2021-09-13

## 2021-09-13 ENCOUNTER — APPOINTMENT (OUTPATIENT)
Dept: LAB | Facility: HOSPITAL | Age: 61
End: 2021-09-13
Attending: INTERNAL MEDICINE
Payer: COMMERCIAL

## 2021-09-13 DIAGNOSIS — N18.4 CKD (CHRONIC KIDNEY DISEASE) STAGE 4, GFR 15-29 ML/MIN (HCC): ICD-10-CM

## 2021-09-13 LAB
ANION GAP SERPL CALCULATED.3IONS-SCNC: 12 MMOL/L (ref 5–14)
BUN SERPL-MCNC: 70 MG/DL (ref 5–25)
CALCIUM SERPL-MCNC: 8.8 MG/DL (ref 8.4–10.2)
CHLORIDE SERPL-SCNC: 103 MMOL/L (ref 97–108)
CO2 SERPL-SCNC: 27 MMOL/L (ref 22–30)
CREAT SERPL-MCNC: 2.63 MG/DL (ref 0.6–1.2)
GFR SERPL CREATININE-BSD FRML MDRD: 19 ML/MIN/1.73SQ M
GLUCOSE SERPL-MCNC: 50 MG/DL (ref 70–99)
POTASSIUM SERPL-SCNC: 4.5 MMOL/L (ref 3.6–5)
SODIUM SERPL-SCNC: 142 MMOL/L (ref 137–147)

## 2021-09-13 PROCEDURE — 36415 COLL VENOUS BLD VENIPUNCTURE: CPT

## 2021-09-13 PROCEDURE — 80048 BASIC METABOLIC PNL TOTAL CA: CPT

## 2021-09-13 RX ORDER — CARVEDILOL 25 MG/1
TABLET ORAL
Qty: 60 TABLET | Refills: 0 | Status: SHIPPED | OUTPATIENT
Start: 2021-09-13 | End: 2021-10-12

## 2021-09-13 NOTE — PROGRESS NOTES
I called the patient but received voicemail  Message was left reminding her of her Neph appointment for tomorrow at 3 pm   I will continue further outreach

## 2021-09-14 ENCOUNTER — OFFICE VISIT (OUTPATIENT)
Dept: NEPHROLOGY | Facility: CLINIC | Age: 61
End: 2021-09-14
Payer: COMMERCIAL

## 2021-09-14 ENCOUNTER — PATIENT OUTREACH (OUTPATIENT)
Dept: FAMILY MEDICINE CLINIC | Facility: CLINIC | Age: 61
End: 2021-09-14

## 2021-09-14 VITALS
SYSTOLIC BLOOD PRESSURE: 124 MMHG | HEART RATE: 80 BPM | WEIGHT: 293 LBS | DIASTOLIC BLOOD PRESSURE: 70 MMHG | HEIGHT: 68 IN | BODY MASS INDEX: 44.41 KG/M2

## 2021-09-14 DIAGNOSIS — I50.9 NEW ONSET OF CONGESTIVE HEART FAILURE (HCC): ICD-10-CM

## 2021-09-14 DIAGNOSIS — N18.9 CHRONIC KIDNEY DISEASE, UNSPECIFIED CKD STAGE: ICD-10-CM

## 2021-09-14 DIAGNOSIS — I10 ESSENTIAL HYPERTENSION: Primary | ICD-10-CM

## 2021-09-14 DIAGNOSIS — N18.4 CKD (CHRONIC KIDNEY DISEASE) STAGE 4, GFR 15-29 ML/MIN (HCC): ICD-10-CM

## 2021-09-14 DIAGNOSIS — R80.8 OTHER PROTEINURIA: ICD-10-CM

## 2021-09-14 PROBLEM — N18.30 CKD (CHRONIC KIDNEY DISEASE) STAGE 3, GFR 30-59 ML/MIN (HCC): Status: RESOLVED | Noted: 2020-09-09 | Resolved: 2021-09-14

## 2021-09-14 PROCEDURE — 99214 OFFICE O/P EST MOD 30 MIN: CPT | Performed by: INTERNAL MEDICINE

## 2021-09-14 NOTE — PROGRESS NOTES
I called the patient to remind her of her Neph appointment for this afternoon and her Cardiology appointment for tomorrow; times provided and she plans on attending stating "these are 2 I don't want to miss"  I informed the patient the shingles vaccine was sent to her pharmacy and she can go at her convenience  The patient stated she called [employer] for the number of her parking placard but was on hold for too long and did not reach them  She states she will try again  I will follow up with the patient before she leaves on her vacation at the end of the month

## 2021-09-14 NOTE — PROGRESS NOTES
NEPHROLOGY PROGRESS NOTE    Josy Ramon 64 y o  female MRN: [id number]  Unit/Bed#:  Encounter: [id number]  Reason for Consult:  Chronic kidney disease    The patient is awake and alert  Since I have last seen her she actually got admitted to [facility] with shortness of breath was found have congestive heart failure  Her cardiologist was planning a cardiac catheterization [facility]  after she was stabilized and underwent cardiac catheterization  She has now been placed on daily diuretic and she is here for routine follow-up and states that she is doing well breathing comfortably  ASSESSMENT/PLAN:  1  Renal    The patient is chronic kidney disease and workups revealed that she has 5 g of proteinuria so she likely has diabetic nephropathy  Creatinine had been running in the low 2 range and most recent 1 is 2 6  She was hospitalized and she has been placed on twice a day loop diuretic and she was diuresed for heart failure sore creatinine likely is higher due to the effect of the diuretic but it is necessary to help avoid volume overload  She had an SPEP and UPEP that were negative for M spike and they did do immunoelectrophoresis on the serum sample on was negative  She has a suprapubic cath as well  She has made dramatic improvement in her hemoglobin A1c with improved diet  I told her that it is really important to control her sugars to help delay progression  Blood pressure is well controlled and she is on a statin  At this point I am holding off on starting an ACE-inhibitor or ARB as her creatinine slightly above baseline  I would prefer to be stable for couple months make sure the creatinine is stable at her baseline and not increasing on diuretic and then likely will try an implement this to try reduce proteinuria      For now continue current medications  We will call her to make sure we have the accurate dose of diuretic she is on  BMP in follow-up in December 2  Cardiac    Patient was found to have systolic dysfunction  Cardiac catheterization revealed 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 that was chronic with fairly pain left-sided coronary vessels  She will follow-up with Cardiology for medical management therapy  Overall her symptoms of heart failure improved and will continue with her current diuretic dose  SUBJECTIVE:  Review of Systems   Constitutional: Negative for chills, diaphoresis, fever and malaise/fatigue  HENT: Negative  Eyes: Negative  Cardiovascular: Positive for leg swelling  Negative for chest pain, dyspnea on exertion and orthopnea  Respiratory: Negative for cough, shortness of breath, sputum production and wheezing  Gastrointestinal: Negative for abdominal pain, diarrhea, nausea and vomiting  Genitourinary:        Suprapubic tube  No hematuria  Neurological: Negative for dizziness, focal weakness, headaches and light-headedness  Psychiatric/Behavioral: Negative for altered mental status, depression, hallucinations and hypervigilance  OBJECTIVE:  Current Weight: Weight - Scale: 133 kg (294 lb)  [email or web address]:     Blood pressure 124/70, pulse 80, height 5' 8" (1 727 m), weight 133 kg (294 lb), not currently breastfeeding  , Body mass index is 44 7 kg/m²  [unfilled]    Physical Exam: /70 (BP Location: Right arm, Patient Position: Sitting, Cuff Size: Standard)   Pulse 80   Ht 5' 8" (1 727 m)   Wt 133 kg (294 lb)   BMI 44 70 kg/m²   Physical Exam  Constitutional:       General: She is not in acute distress  Appearance: She is not toxic-appearing or diaphoretic  HENT:      Head: Normocephalic and atraumatic  Nose:      Comments: Wearing mask     Mouth/Throat:      Comments: Wearing mask  Eyes:      General: No scleral icterus  Extraocular Movements: Extraocular movements intact  Cardiovascular:      Rate and Rhythm: Normal rate and regular rhythm        Heart sounds: No friction rub  No gallop  Comments: Positive edema  Pulmonary:      Effort: Pulmonary effort is normal  No respiratory distress  Breath sounds: Normal breath sounds  No wheezing, rhonchi or rales  Abdominal:      General: Bowel sounds are normal  There is no distension  Palpations: Abdomen is soft  Tenderness: There is no abdominal tenderness  There is no rebound  Comments: Suprapubic tube present  Musculoskeletal:      Cervical back: Normal range of motion and neck supple  Neurological:      General: No focal deficit present  Mental Status: She is alert and oriented to person, place, and time  Mental status is at baseline  Comments: Walks with cane  Psychiatric:         Mood and Affect: Mood normal          Behavior: Behavior normal          Thought Content:  Thought content normal          Judgment: Judgment normal          Medications:    Current Outpatient Medications:     allopurinol (ZYLOPRIM) 300 mg tablet, Take 1 tablet (300 mg total) by mouth daily, Disp: 90 tablet, Rfl: 1    amLODIPine (NORVASC) 5 mg tablet, TAKE 1 TABLET BY MOUTH EVERY DAY, Disp: 30 tablet, Rfl: 0    Aspirin Low Dose 81 MG EC tablet, TAKE 1 TABLET (81 MG TOTAL) BY MOUTH ONCE FOR 1 DOSE, Disp: 90 tablet, Rfl: 0    atorvastatin (LIPITOR) 40 mg tablet, TAKE 1 TABLET BY MOUTH EVERY DAY, Disp: 90 tablet, Rfl: 0    carvedilol (COREG) 25 mg tablet, TAKE 1 TABLET BY MOUTH TWICE A DAY WITH FOOD, Disp: 60 tablet, Rfl: 0    citalopram (CeleXA) 40 mg tablet, Take 1 tablet (40 mg total) by mouth daily, Disp: 30 tablet, Rfl: 2    clopidogrel (PLAVIX) 75 mg tablet, TAKE 1 TABLET BY MOUTH EVERY DAY, Disp: 90 tablet, Rfl: 1    collagenase (SANTYL) ointment, Apply topically daily (Patient taking differently: Apply topically as needed ), Disp: 15 g, Rfl: 0    Continuous Blood Gluc  (FreeStyle Iraida 2 [place]) YOUNG, Use as directed, Disp: 1 each, Rfl: 0    Continuous Blood Gluc Sensor (FreeStyle Iraida 14 Day Sensor) MISC, USE 1 SENSOR EVERY 2 WEEKS, Disp: 2 each, Rfl: 3    Diclofenac Sodium (VOLTAREN) 1 %, Apply 2 g topically 4 (four) times a day (Patient taking differently: Apply 2 g topically as needed ), Disp: 100 g, Rfl: 1    diphenhydrAMINE (BENADRYL) 25 mg capsule, Take 25 mg by mouth every 4 (four) hours as needed for allergies or sleep , Disp: , Rfl:     docusate sodium (COLACE) 100 mg capsule, Take 1 capsule (100 mg total) by mouth 2 (two) times a day, Disp: 10 capsule, Rfl: 0    Dulaglutide 1 5 MG/0 5ML SOPN, Inject 0 5 mL (1 5 mg total) under the skin once a week, Disp: 4 pen, Rfl: 3    ferrous sulfate 325 (65 Fe) mg tablet, Take 1 tablet (325 mg total) by mouth every other day, Disp: 30 tablet, Rfl: 0    gabapentin (NEURONTIN) 600 MG tablet, TAKE 1 TABLET (600 MG TOTAL) BY MOUTH 4 (FOUR) TIMES A DAY, Disp: 120 tablet, Rfl: 2    glucose blood (OneTouch Ultra) test strip, Use 1 each daily Use as instructed, Disp: 100 each, Rfl: 2    HYDROcodone-acetaminophen (NORCO) 5-325 mg per tablet, Take 1 tablet by mouth 2 (two) times a day as needed for pain For ongoing painMax Daily Amount: 2 tablets, Disp: 60 tablet, Rfl: 0    insulin glargine (Lantus SoloStar) 100 units/mL injection pen, Inject 50 Units under the skin every 12 (twelve) hours, Disp: 30 mL, Rfl: 1    insulin lispro (HumaLOG KwikPen) 100 units/mL injection pen, Inject 10 Units under the skin 3 (three) times a day with meals If blood glucose >150, Disp: 15 mL, Rfl: 5    Insulin Pen Needle (BD Pen Needle Micro U/F) 32G X 6 MM MISC, Use 3 (three) times a day, Disp: 100 each, Rfl: 5    Insulin Syringe-Needle U-100 (BD Veo Insulin Syringe U/F) 31G X 15/64" 0 5 ML MISC, Inject under the skin 3 (three) times a day, Disp: 100 each, Rfl: 2    isosorbide mononitrate (IMDUR) 30 mg 24 hr tablet, Take 1 tablet (30 mg total) by mouth daily, Disp: 30 tablet, Rfl: 6    Lancets (OneTouch Delica Plus [id number]) MISC, USE TO TEST 3 TIMES DAILY, Disp: 100 each, Rfl: 2    levothyroxine 50 mcg tablet, TAKE 1 TABLET BY MOUTH EVERY DAY, Disp: 60 tablet, Rfl: 0    nitroglycerin (NITROSTAT) 0 4 mg SL tablet, Place 1 tablet (0 4 mg total) under the tongue every 5 (five) minutes as needed for chest pain, Disp: 25 tablet, Rfl: 1    nystatin (MYCOSTATIN) powder, Apply topically 2 (two) times a day, Disp: 30 g, Rfl: 1    OLANZapine (ZyPREXA) 5 mg tablet, TAKE 1 TABLET BY MOUTH EVERYDAY AT BEDTIME, Disp: 90 tablet, Rfl: 0    oxybutynin (DITROPAN-XL) 10 MG 24 hr tablet, TAKE 1 TABLET BY MOUTH DAILY AT BEDTIME, Disp: 90 tablet, Rfl: 1    silver sulfadiazine (SILVADENE,SSD) 1 % cream, Apply topically daily To burns on fingers, cover w/band-aid  (Patient taking differently: Apply topically as needed To burns on fingers, cover w/band-aid ), Disp: 50 g, Rfl: 0    torsemide (DEMADEX) 20 mg tablet, Take 1 tablet (20 mg total) by mouth daily as needed (for weight gain of 3 lbs in one day or 5 lbs over 3 days, increased leg swelling, shortness of breath), Disp: 60 tablet, Rfl: 0    naloxone (NARCAN) 4 mg/0 1 mL nasal spray, Administer 1 spray into a nostril  If breathing does not return to normal or if breathing difficulty resumes after 2-3 minutes, give another dose in the other nostril using a new spray   (Patient not taking: Reported on 7/13/2021), Disp: 1 each, Rfl: 1    Laboratory Results:  Lab Results   Component Value Date    WBC 9 02 09/01/2021    HGB 9 0 (L) 09/01/2021    HCT 28 0 (L) 09/01/2021    MCV 97 09/01/2021     09/01/2021     Lab Results   Component Value Date    SODIUM 142 09/13/2021    K 4 5 09/13/2021     09/13/2021    CO2 27 09/13/2021    BUN 70 (H) 09/13/2021    CREATININE 2 63 (H) 09/13/2021    GLUC 50 (L) 09/13/2021    CALCIUM 8 8 09/13/2021     Lab Results   Component Value Date    CALCIUM 8 8 09/13/2021    PHOS 5 8 (H) 08/29/2021     No results found for: LABPROT

## 2021-09-14 NOTE — LETTER
September 14, 2021     Kannan Esquivel, [address]  [address] [facility]  [place] [facility]    Patient: Katarina Coronado   YOB: 1960   Date of Visit: 9/14/2021       Dear Dr Jose Lei: Thank you for referring Katarina Coronado to me for evaluation  Below are my notes for this consultation  If you have questions, please do not hesitate to call me  I look forward to following your patient along with you  Sincerely,        Isra Pedroza MD        CC: DO Isra Quinteros MD  9/14/2021  3:59 PM  Sign when Signing Visit  NEPHROLOGY PROGRESS NOTE    Katarina Coronado 64 y o  female MRN: [id number]  Unit/Bed#:  Encounter: [id number]  Reason for Consult:  Chronic kidney disease    The patient is awake and alert  Since I have last seen her she actually got admitted to [facility] with shortness of breath was found have congestive heart failure  Her cardiologist was planning a cardiac catheterization she was then transferred to [facility] after she was stabilized and underwent cardiac catheterization  She has now been placed on daily diuretic and she is here for routine follow-up and states that she is doing well breathing comfortably  ASSESSMENT/PLAN:  1  Renal    The patient is chronic kidney disease and workups revealed that she has 5 g of proteinuria so she likely has diabetic nephropathy  Creatinine had been running in the low 2 range and most recent 1 is 2 6  She was hospitalized and she has been placed on twice a day loop diuretic and she was diuresed for heart failure sore creatinine likely is higher due to the effect of the diuretic but it is necessary to help avoid volume overload  She had an SPEP and UPEP that were negative for M spike and they did do immunoelectrophoresis on the serum sample on was negative  She has a suprapubic cath as well  She has made dramatic improvement in her hemoglobin A1c with improved diet    I told her that it is really important to control her sugars to help delay progression  Blood pressure is well controlled and she is on a statin  At this point I am holding off on starting an ACE-inhibitor or ARB as her creatinine slightly above baseline  I would prefer to be stable for couple months make sure the creatinine is stable at her baseline and not increasing on diuretic and then likely will try an implement this to try reduce proteinuria  For now continue current medications  We will call her to make sure we have the accurate dose of diuretic she is on  BMP in follow-up in December 2  Cardiac    Patient was found to have systolic dysfunction  Cardiac catheterization revealed 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 that was chronic with fairly pain left-sided coronary vessels  She will follow-up with Cardiology for medical management therapy  Overall her symptoms of heart failure improved and will continue with her current diuretic dose  SUBJECTIVE:  Review of Systems   Constitutional: Negative for chills, diaphoresis, fever and malaise/fatigue  HENT: Negative  Eyes: Negative  Cardiovascular: Positive for leg swelling  Negative for chest pain, dyspnea on exertion and orthopnea  Respiratory: Negative for cough, shortness of breath, sputum production and wheezing  Gastrointestinal: Negative for abdominal pain, diarrhea, nausea and vomiting  Genitourinary:        Suprapubic tube  No hematuria  Neurological: Negative for dizziness, focal weakness, headaches and light-headedness  Psychiatric/Behavioral: Negative for altered mental status, depression, hallucinations and hypervigilance  OBJECTIVE:  Current Weight: Weight - Scale: 133 kg (294 lb)  [email or web address]:     Blood pressure 124/70, pulse 80, height 5' 8" (1 727 m), weight 133 kg (294 lb), not currently breastfeeding  , Body mass index is 44 7 kg/m²      [unfilled]    Physical Exam: /70 (BP Location: Right arm, Patient Position: Sitting, Cuff Size: Standard)   Pulse 80   Ht 5' 8" (1 727 m)   Wt 133 kg (294 lb)   BMI 44 70 kg/m²   Physical Exam  Constitutional:       General: She is not in acute distress  Appearance: She is not toxic-appearing or diaphoretic  HENT:      Head: Normocephalic and atraumatic  Nose:      Comments: Wearing mask     Mouth/Throat:      Comments: Wearing mask  Eyes:      General: No scleral icterus  Extraocular Movements: Extraocular movements intact  Cardiovascular:      Rate and Rhythm: Normal rate and regular rhythm  Heart sounds: No friction rub  No gallop  Comments: Positive edema  Pulmonary:      Effort: Pulmonary effort is normal  No respiratory distress  Breath sounds: Normal breath sounds  No wheezing, rhonchi or rales  Abdominal:      General: Bowel sounds are normal  There is no distension  Palpations: Abdomen is soft  Tenderness: There is no abdominal tenderness  There is no rebound  Comments: Suprapubic tube present  Musculoskeletal:      Cervical back: Normal range of motion and neck supple  Neurological:      General: No focal deficit present  Mental Status: She is alert and oriented to person, place, and time  Mental status is at baseline  Comments: Walks with cane  Psychiatric:         Mood and Affect: Mood normal          Behavior: Behavior normal          Thought Content:  Thought content normal          Judgment: Judgment normal          Medications:    Current Outpatient Medications:     allopurinol (ZYLOPRIM) 300 mg tablet, Take 1 tablet (300 mg total) by mouth daily, Disp: 90 tablet, Rfl: 1    amLODIPine (NORVASC) 5 mg tablet, TAKE 1 TABLET BY MOUTH EVERY DAY, Disp: 30 tablet, Rfl: 0    Aspirin Low Dose 81 MG EC tablet, TAKE 1 TABLET (81 MG TOTAL) BY MOUTH ONCE FOR 1 DOSE, Disp: 90 tablet, Rfl: 0    atorvastatin (LIPITOR) 40 mg tablet, TAKE 1 TABLET BY MOUTH EVERY DAY, Disp: 90 tablet, Rfl: 0    carvedilol (COREG) 25 mg tablet, TAKE 1 TABLET BY MOUTH TWICE A DAY WITH FOOD, Disp: 60 tablet, Rfl: 0    citalopram (CeleXA) 40 mg tablet, Take 1 tablet (40 mg total) by mouth daily, Disp: 30 tablet, Rfl: 2    clopidogrel (PLAVIX) 75 mg tablet, TAKE 1 TABLET BY MOUTH EVERY DAY, Disp: 90 tablet, Rfl: 1    collagenase (SANTYL) ointment, Apply topically daily (Patient taking differently: Apply topically as needed ), Disp: 15 g, Rfl: 0    Continuous Blood Gluc  (FreeStyle Iraida 2 [place]) YOUNG, Use as directed, Disp: 1 each, Rfl: 0    Continuous Blood Gluc Sensor (FreeStyle Iraida 14 Day Sensor) MISC, USE 1 SENSOR EVERY 2 WEEKS, Disp: 2 each, Rfl: 3    Diclofenac Sodium (VOLTAREN) 1 %, Apply 2 g topically 4 (four) times a day (Patient taking differently: Apply 2 g topically as needed ), Disp: 100 g, Rfl: 1    diphenhydrAMINE (BENADRYL) 25 mg capsule, Take 25 mg by mouth every 4 (four) hours as needed for allergies or sleep , Disp: , Rfl:     docusate sodium (COLACE) 100 mg capsule, Take 1 capsule (100 mg total) by mouth 2 (two) times a day, Disp: 10 capsule, Rfl: 0    Dulaglutide 1 5 MG/0 5ML SOPN, Inject 0 5 mL (1 5 mg total) under the skin once a week, Disp: 4 pen, Rfl: 3    ferrous sulfate 325 (65 Fe) mg tablet, Take 1 tablet (325 mg total) by mouth every other day, Disp: 30 tablet, Rfl: 0    gabapentin (NEURONTIN) 600 MG tablet, TAKE 1 TABLET (600 MG TOTAL) BY MOUTH 4 (FOUR) TIMES A DAY, Disp: 120 tablet, Rfl: 2    glucose blood (OneTouch Ultra) test strip, Use 1 each daily Use as instructed, Disp: 100 each, Rfl: 2    HYDROcodone-acetaminophen (NORCO) 5-325 mg per tablet, Take 1 tablet by mouth 2 (two) times a day as needed for pain For ongoing painMax Daily Amount: 2 tablets, Disp: 60 tablet, Rfl: 0    insulin glargine (Lantus SoloStar) 100 units/mL injection pen, Inject 50 Units under the skin every 12 (twelve) hours, Disp: 30 mL, Rfl: 1    insulin lispro (HumaLOG KwikPen) 100 units/mL injection pen, Inject 10 Units under the skin 3 (three) times a day with meals If blood glucose >150, Disp: 15 mL, Rfl: 5    Insulin Pen Needle (BD Pen Needle Micro U/F) 32G X 6 MM MISC, Use 3 (three) times a day, Disp: 100 each, Rfl: 5    Insulin Syringe-Needle U-100 (BD Veo Insulin Syringe U/F) 31G X 15/64" 0 5 ML MISC, Inject under the skin 3 (three) times a day, Disp: 100 each, Rfl: 2    isosorbide mononitrate (IMDUR) 30 mg 24 hr tablet, Take 1 tablet (30 mg total) by mouth daily, Disp: 30 tablet, Rfl: 6    Lancets (OneTouch Delica Plus [id number]) MISC, USE TO TEST 3 TIMES DAILY, Disp: 100 each, Rfl: 2    levothyroxine 50 mcg tablet, TAKE 1 TABLET BY MOUTH EVERY DAY, Disp: 60 tablet, Rfl: 0    nitroglycerin (NITROSTAT) 0 4 mg SL tablet, Place 1 tablet (0 4 mg total) under the tongue every 5 (five) minutes as needed for chest pain, Disp: 25 tablet, Rfl: 1    nystatin (MYCOSTATIN) powder, Apply topically 2 (two) times a day, Disp: 30 g, Rfl: 1    OLANZapine (ZyPREXA) 5 mg tablet, TAKE 1 TABLET BY MOUTH EVERYDAY AT BEDTIME, Disp: 90 tablet, Rfl: 0    oxybutynin (DITROPAN-XL) 10 MG 24 hr tablet, TAKE 1 TABLET BY MOUTH DAILY AT BEDTIME, Disp: 90 tablet, Rfl: 1    silver sulfadiazine (SILVADENE,SSD) 1 % cream, Apply topically daily To burns on fingers, cover w/band-aid  (Patient taking differently: Apply topically as needed To burns on fingers, cover w/band-aid ), Disp: 50 g, Rfl: 0    torsemide (DEMADEX) 20 mg tablet, Take 1 tablet (20 mg total) by mouth daily as needed (for weight gain of 3 lbs in one day or 5 lbs over 3 days, increased leg swelling, shortness of breath), Disp: 60 tablet, Rfl: 0    naloxone (NARCAN) 4 mg/0 1 mL nasal spray, Administer 1 spray into a nostril  If breathing does not return to normal or if breathing difficulty resumes after 2-3 minutes, give another dose in the other nostril using a new spray   (Patient not taking: Reported on 7/13/2021), Disp: 1 each, Rfl: 1    Laboratory Results:  Lab Results   Component Value Date    WBC 9 02 09/01/2021    HGB 9 0 (L) 09/01/2021    HCT 28 0 (L) 09/01/2021    MCV 97 09/01/2021     09/01/2021     Lab Results   Component Value Date    SODIUM 142 09/13/2021    K 4 5 09/13/2021     09/13/2021    CO2 27 09/13/2021    BUN 70 (H) 09/13/2021    CREATININE 2 63 (H) 09/13/2021    GLUC 50 (L) 09/13/2021    CALCIUM 8 8 09/13/2021     Lab Results   Component Value Date    CALCIUM 8 8 09/13/2021    PHOS 5 8 (H) 08/29/2021     No results found for: LABPROT

## 2021-09-14 NOTE — PATIENT INSTRUCTIONS
You are here for follow-up  We reviewed what happened recently that you develop fluid buildup in were admitted to the hospital with congestive heart failure your treated medically and then you underwent cardiac catheterization and it revealed 1 blood vessels blocks but the others were open  You then had some medications added specifically the water pill  We think it is either furosemide or torsemide but I will have my office contact you to find out what is for the record  As result the creatinine level which is the blood test for the kidney function is a little bit higher than it had been but that is due to the water pill medicine to keep fluid down  You do have diabetic kidney disease and I have to say you have done amazing job with reducing her blood sugars and that certainly will help  Blood pressure is under good control  For now continue current medications with no changes  In the future I may consider adding a medicine called lisinopril to see if we can reduce protein but since your recently L the hospital in your kidney number was a little bit up I am going to hold off on it for now  Labs and follow-up as scheduled    As instructed I would weigh yourself every day if you can if you start to gained a couple lb each day that needs her starting to retain fluid and he should contact me or your cardiologist for advice

## 2021-09-15 ENCOUNTER — OFFICE VISIT (OUTPATIENT)
Dept: CARDIOLOGY CLINIC | Facility: CLINIC | Age: 61
End: 2021-09-15
Payer: COMMERCIAL

## 2021-09-15 VITALS
HEIGHT: 68 IN | HEART RATE: 70 BPM | BODY MASS INDEX: 44.16 KG/M2 | SYSTOLIC BLOOD PRESSURE: 130 MMHG | DIASTOLIC BLOOD PRESSURE: 60 MMHG | WEIGHT: 291.4 LBS

## 2021-09-15 DIAGNOSIS — Z79.4 TYPE 2 DIABETES MELLITUS WITH DIABETIC POLYNEUROPATHY, WITH LONG-TERM CURRENT USE OF INSULIN (HCC): ICD-10-CM

## 2021-09-15 DIAGNOSIS — I20.8 STABLE ANGINA (HCC): ICD-10-CM

## 2021-09-15 DIAGNOSIS — E11.42 TYPE 2 DIABETES MELLITUS WITH DIABETIC POLYNEUROPATHY, WITH LONG-TERM CURRENT USE OF INSULIN (HCC): ICD-10-CM

## 2021-09-15 DIAGNOSIS — I25.10 CORONARY ARTERY DISEASE INVOLVING NATIVE HEART WITHOUT ANGINA PECTORIS, UNSPECIFIED VESSEL OR LESION TYPE: ICD-10-CM

## 2021-09-15 DIAGNOSIS — F41.9 ANXIETY: ICD-10-CM

## 2021-09-15 DIAGNOSIS — I10 ESSENTIAL HYPERTENSION: ICD-10-CM

## 2021-09-15 DIAGNOSIS — I50.9 NEW ONSET OF CONGESTIVE HEART FAILURE (HCC): Primary | ICD-10-CM

## 2021-09-15 PROCEDURE — 99214 OFFICE O/P EST MOD 30 MIN: CPT

## 2021-09-15 PROCEDURE — 93000 ELECTROCARDIOGRAM COMPLETE: CPT

## 2021-09-15 RX ORDER — ISOSORBIDE MONONITRATE 60 MG/1
60 TABLET, EXTENDED RELEASE ORAL DAILY
Qty: 30 TABLET | Refills: 6 | Status: SHIPPED | OUTPATIENT
Start: 2021-09-15 | End: 2022-03-01

## 2021-09-15 NOTE — PROGRESS NOTES
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 Maternal Grandmother     No Known Problems Maternal Grandfather     No Known Problems Paternal Grandmother     No Known Problems Paternal Grandfather     No Known Problems Maternal Aunt     No Known Problems Maternal Aunt     No Known Problems Maternal Aunt     No Known Problems Maternal Aunt     No Known Problems Maternal Aunt     No Known Problems Maternal Aunt     No Known Problems Paternal Aunt        Lab Results   Component Value Date    WBC 9 02 09/01/2021    HGB 9 0 (L) 09/01/2021    HCT 28 0 (L) 09/01/2021    MCV 97 09/01/2021     09/01/2021      Lab Results   Component Value Date    SODIUM 142 09/13/2021    K 4 5 09/13/2021     09/13/2021    CO2 27 09/13/2021    BUN 70 (H) 09/13/2021 CREATININE 2 63 (H) 09/13/2021    GLUC 50 (L) 09/13/2021    CALCIUM 8 8 09/13/2021      Lab Results   Component Value Date    HGBA1C 7 1 (H) 08/03/2021      No results found for: CHOL  Lab Results   Component Value Date    HDL 30 (L) 08/03/2021    HDL 45 12/18/2020     Lab Results   Component Value Date    LDLCALC 86 08/03/2021    LDLCALC 122 12/18/2020     Lab Results   Component Value Date    TRIG 180 (H) 08/03/2021    TRIG 162 (H) 12/18/2020     No results found for: [place], [place]   Lab Results   Component Value Date    INR 1 03 08/23/2021    INR 0 93 05/26/2021    PROTIME 13 6 08/23/2021    PROTIME 12 3 05/26/2021          Patient Active Problem List    Diagnosis Date Noted    New onset of congestive heart failure ([facility] [address] ) 08/24/2021    Hypocalcemia 08/24/2021    Prolonged QT interval 08/24/2021    Urinary tract infection associated with cystostomy catheter ([facility] [address] ) 08/23/2021    Neurogenic bladder 08/23/2021    ARF (acute renal failure) ([address] ) 06/23/2021    Morbid obesity with BMI of 45 0-49 9, adult ([address] ) 06/15/2021    History of heart artery stent 06/15/2021    Memory impairment 05/26/2021    Chronic bronchitis ([address] ) 05/25/2021    Severe episode of recurrent major depressive disorder, without psychotic features ([address] ) 05/25/2021    Skin ulcer of hand, limited to breakdown of skin ([facility] [address] ) 02/25/2021    HTN (hypertension) 12/21/2020    Diabetic ulcer of toe of right foot associated with type 2 diabetes mellitus, with muscle involvement without evidence of necrosis ([address] ) 11/25/2020    Head lump 11/11/2020    Need for follow-up by  11/11/2020    Normochromic normocytic anemia 09/09/2020    Abnormal LFTs 09/09/2020    S/P cervical spinal fusion 09/09/2020    Major depressive disorder 09/02/2020    Type 2 diabetes mellitus with diabetic polyneuropathy, with long-term current use of insulin ([address] ) 09/02/2020    Anxiety 09/02/2020    CAD (coronary artery disease) 09/02/2020    Osteoarthritis of left knee 09/02/2020    Healthcare maintenance 09/02/2020    Cellulitis of finger of right hand 08/26/2020    Other proteinuria 09/14/2021    CKD (chronic kidney disease) 09/14/2021       Portions of the record may have been created with voice recognition software  Occasional wrong word or "sound a like" substitutions may have occurred due to the inherent limitations of voice recognition software  Read the chart carefully and recognize, using context, where substitutions have occurred      Starla Spurling, DO, [facility] [place]  9/15/2021 2:18 PM

## 2021-09-21 ENCOUNTER — DOCUMENTATION (OUTPATIENT)
Dept: NEPHROLOGY | Facility: CLINIC | Age: 61
End: 2021-09-21

## 2021-09-24 ENCOUNTER — TELEPHONE (OUTPATIENT)
Dept: UROLOGY | Facility: MEDICAL CENTER | Age: 61
End: 2021-09-24

## 2021-09-24 ENCOUNTER — NURSE TRIAGE (OUTPATIENT)
Dept: OTHER | Facility: OTHER | Age: 61
End: 2021-09-24

## 2021-09-24 DIAGNOSIS — I10 ESSENTIAL HYPERTENSION: ICD-10-CM

## 2021-09-24 RX ORDER — AMLODIPINE BESYLATE 5 MG/1
TABLET ORAL
Qty: 30 TABLET | Refills: 0 | Status: SHIPPED | OUTPATIENT
Start: 2021-09-24 | End: 2021-10-28

## 2021-09-24 NOTE — TELEPHONE ENCOUNTER
Regarding: Trouble changing catheter  ----- Message from Maria E Cote sent at 9/24/2021  3:00 PM EDT -----  "I had a visiting nurse come to change my catheter today, and it wouldn't come out    She wanted me to call the office "

## 2021-09-24 NOTE — TELEPHONE ENCOUNTER
Reason for Disposition   [1] Catheter is broken or cracked AND [2] is still usable    Answer Assessment - Initial Assessment Questions  1  SYMPTOMS: "What symptoms are you concerned about?"      Unable to change/remove suprapubic catheter  2   ONSET:  "When did the symptoms start?"  today    Protocols used: URINARY CATHETER SYMPTOMS AND QUESTIONS-ADULT-AH

## 2021-09-24 NOTE — TELEPHONE ENCOUNTER
Home health RN unable to remove catheter to change today @ 1400  Per pt, the RN did remove NS from bulb  Pt denies any leaking urine, hematuria or pain  She is flying on Tuesday to see family for 1 week  Please contact

## 2021-09-24 NOTE — TELEPHONE ENCOUNTER
Contacted and spoke with patient  Patient states the VN left SPT indwelling and reinflated the balloon  Patient unable to come to office  Has no transportation  Patient leaving for [place] on Tuesday the 09/28/2021  Patient states the SPT is draining and wishes to come to office when she returns home  VN wishes for patient to come to office too per patient  Patient scheduled 10/6/2021 at 130 pm for SPT change at the [facility] office  I advised patient if the SPT stops draining she needs to proceed to ER

## 2021-09-27 ENCOUNTER — PATIENT OUTREACH (OUTPATIENT)
Dept: FAMILY MEDICINE CLINIC | Facility: CLINIC | Age: 61
End: 2021-09-27

## 2021-09-27 DIAGNOSIS — F51.05 INSOMNIA SECONDARY TO ANXIETY: ICD-10-CM

## 2021-09-27 DIAGNOSIS — F41.9 INSOMNIA SECONDARY TO ANXIETY: ICD-10-CM

## 2021-09-28 RX ORDER — OLANZAPINE 5 MG/1
TABLET ORAL
Qty: 90 TABLET | Refills: 0 | Status: ON HOLD | OUTPATIENT
Start: 2021-09-28 | End: 2022-02-14

## 2021-10-05 ENCOUNTER — PATIENT OUTREACH (OUTPATIENT)
Dept: FAMILY MEDICINE CLINIC | Facility: CLINIC | Age: 61
End: 2021-10-05

## 2021-10-06 ENCOUNTER — PROCEDURE VISIT (OUTPATIENT)
Dept: UROLOGY | Facility: CLINIC | Age: 61
End: 2021-10-06
Payer: COMMERCIAL

## 2021-10-06 VITALS
WEIGHT: 283 LBS | DIASTOLIC BLOOD PRESSURE: 70 MMHG | HEIGHT: 68 IN | SYSTOLIC BLOOD PRESSURE: 126 MMHG | RESPIRATION RATE: 20 BRPM | BODY MASS INDEX: 42.89 KG/M2 | HEART RATE: 72 BPM

## 2021-10-06 DIAGNOSIS — N31.9 NEUROGENIC BLADDER: Primary | ICD-10-CM

## 2021-10-06 PROCEDURE — 51705 CHANGE OF BLADDER TUBE: CPT

## 2021-10-11 DIAGNOSIS — E03.9 HYPOTHYROIDISM, UNSPECIFIED TYPE: ICD-10-CM

## 2021-10-11 RX ORDER — LEVOTHYROXINE SODIUM 0.05 MG/1
TABLET ORAL
Qty: 60 TABLET | Refills: 0 | Status: SHIPPED | OUTPATIENT
Start: 2021-10-11 | End: 2021-12-06

## 2021-10-12 ENCOUNTER — PATIENT OUTREACH (OUTPATIENT)
Dept: FAMILY MEDICINE CLINIC | Facility: CLINIC | Age: 61
End: 2021-10-12

## 2021-10-12 DIAGNOSIS — Z79.4 CURRENT USE OF INSULIN (HCC): ICD-10-CM

## 2021-10-12 DIAGNOSIS — E11.42 TYPE 2 DIABETES MELLITUS WITH DIABETIC POLYNEUROPATHY, WITH LONG-TERM CURRENT USE OF INSULIN (HCC): ICD-10-CM

## 2021-10-12 DIAGNOSIS — Z79.4 TYPE 2 DIABETES MELLITUS WITH DIABETIC POLYNEUROPATHY, WITH LONG-TERM CURRENT USE OF INSULIN (HCC): ICD-10-CM

## 2021-10-12 DIAGNOSIS — I25.10 CORONARY ARTERY DISEASE INVOLVING NATIVE HEART WITHOUT ANGINA PECTORIS, UNSPECIFIED VESSEL OR LESION TYPE: ICD-10-CM

## 2021-10-12 DIAGNOSIS — N18.32 STAGE 3B CHRONIC KIDNEY DISEASE (HCC): ICD-10-CM

## 2021-10-13 ENCOUNTER — OFFICE VISIT (OUTPATIENT)
Dept: FAMILY MEDICINE CLINIC | Facility: CLINIC | Age: 61
End: 2021-10-13

## 2021-10-13 VITALS
RESPIRATION RATE: 18 BRPM | BODY MASS INDEX: 43.19 KG/M2 | DIASTOLIC BLOOD PRESSURE: 70 MMHG | SYSTOLIC BLOOD PRESSURE: 136 MMHG | OXYGEN SATURATION: 98 % | HEIGHT: 68 IN | HEART RATE: 72 BPM | TEMPERATURE: 97.5 F | WEIGHT: 285 LBS

## 2021-10-13 DIAGNOSIS — E11.42 TYPE 2 DIABETES MELLITUS WITH DIABETIC POLYNEUROPATHY, WITH LONG-TERM CURRENT USE OF INSULIN (HCC): ICD-10-CM

## 2021-10-13 DIAGNOSIS — Z79.4 TYPE 2 DIABETES MELLITUS WITH DIABETIC POLYNEUROPATHY, WITH LONG-TERM CURRENT USE OF INSULIN (HCC): ICD-10-CM

## 2021-10-13 DIAGNOSIS — M17.12 PRIMARY OSTEOARTHRITIS OF LEFT KNEE: ICD-10-CM

## 2021-10-13 DIAGNOSIS — Z23 FLU VACCINE NEED: ICD-10-CM

## 2021-10-13 DIAGNOSIS — E11.621 DIABETIC ULCER OF TOE OF RIGHT FOOT ASSOCIATED WITH TYPE 2 DIABETES MELLITUS, WITH MUSCLE INVOLVEMENT WITHOUT EVIDENCE OF NECROSIS (HCC): Primary | ICD-10-CM

## 2021-10-13 DIAGNOSIS — Z98.1 S/P CERVICAL SPINAL FUSION: ICD-10-CM

## 2021-10-13 DIAGNOSIS — K21.9 GASTROESOPHAGEAL REFLUX DISEASE WITHOUT ESOPHAGITIS: ICD-10-CM

## 2021-10-13 DIAGNOSIS — L97.515 DIABETIC ULCER OF TOE OF RIGHT FOOT ASSOCIATED WITH TYPE 2 DIABETES MELLITUS, WITH MUSCLE INVOLVEMENT WITHOUT EVIDENCE OF NECROSIS (HCC): Primary | ICD-10-CM

## 2021-10-13 PROCEDURE — 99214 OFFICE O/P EST MOD 30 MIN: CPT | Performed by: NURSE PRACTITIONER

## 2021-10-13 PROCEDURE — 90682 RIV4 VACC RECOMBINANT DNA IM: CPT | Performed by: NURSE PRACTITIONER

## 2021-10-13 PROCEDURE — G0008 ADMIN INFLUENZA VIRUS VAC: HCPCS | Performed by: NURSE PRACTITIONER

## 2021-10-13 RX ORDER — SUCRALFATE ORAL 1 G/10ML
1 SUSPENSION ORAL 4 TIMES DAILY
Qty: 280 ML | Refills: 0 | Status: SHIPPED | OUTPATIENT
Start: 2021-10-13 | End: 2022-03-29

## 2021-10-13 RX ORDER — FAMOTIDINE 20 MG/1
20 TABLET, FILM COATED ORAL 2 TIMES DAILY
Qty: 60 TABLET | Refills: 0 | Status: SHIPPED | OUTPATIENT
Start: 2021-10-13 | End: 2021-11-05

## 2021-10-13 RX ORDER — HYDROCODONE BITARTRATE AND ACETAMINOPHEN 5; 325 MG/1; MG/1
1 TABLET ORAL 3 TIMES DAILY PRN
Qty: 90 TABLET | Refills: 0 | Status: SHIPPED | OUTPATIENT
Start: 2021-10-13 | End: 2021-12-01 | Stop reason: SDUPTHER

## 2021-10-13 RX ORDER — CARVEDILOL 25 MG/1
TABLET ORAL
Qty: 60 TABLET | Refills: 3 | Status: SHIPPED | OUTPATIENT
Start: 2021-10-13 | End: 2022-01-11

## 2021-10-14 ENCOUNTER — OFFICE VISIT (OUTPATIENT)
Dept: ENDOCRINOLOGY | Facility: CLINIC | Age: 61
End: 2021-10-14
Payer: COMMERCIAL

## 2021-10-14 VITALS
TEMPERATURE: 98.6 F | SYSTOLIC BLOOD PRESSURE: 130 MMHG | HEIGHT: 68 IN | BODY MASS INDEX: 43.04 KG/M2 | DIASTOLIC BLOOD PRESSURE: 66 MMHG | HEART RATE: 72 BPM | WEIGHT: 284 LBS

## 2021-10-14 DIAGNOSIS — E78.2 MIXED HYPERLIPIDEMIA: ICD-10-CM

## 2021-10-14 DIAGNOSIS — E11.42 TYPE 2 DIABETES MELLITUS WITH DIABETIC POLYNEUROPATHY, WITH LONG-TERM CURRENT USE OF INSULIN (HCC): Primary | ICD-10-CM

## 2021-10-14 DIAGNOSIS — Z79.4 TYPE 2 DIABETES MELLITUS WITH DIABETIC POLYNEUROPATHY, WITH LONG-TERM CURRENT USE OF INSULIN (HCC): Primary | ICD-10-CM

## 2021-10-14 DIAGNOSIS — I10 PRIMARY HYPERTENSION: ICD-10-CM

## 2021-10-14 DIAGNOSIS — E03.9 ACQUIRED HYPOTHYROIDISM: ICD-10-CM

## 2021-10-14 PROCEDURE — 99214 OFFICE O/P EST MOD 30 MIN: CPT | Performed by: PHYSICIAN ASSISTANT

## 2021-10-14 RX ORDER — INSULIN LISPRO 100 [IU]/ML
15 INJECTION, SOLUTION INTRAVENOUS; SUBCUTANEOUS
Qty: 15 ML | Refills: 5
Start: 2021-10-14 | End: 2021-12-14 | Stop reason: SDUPTHER

## 2021-10-20 ENCOUNTER — APPOINTMENT (OUTPATIENT)
Dept: LAB | Facility: HOSPITAL | Age: 61
End: 2021-10-20
Attending: INTERNAL MEDICINE
Payer: COMMERCIAL

## 2021-10-20 ENCOUNTER — PATIENT OUTREACH (OUTPATIENT)
Dept: FAMILY MEDICINE CLINIC | Facility: CLINIC | Age: 61
End: 2021-10-20

## 2021-10-20 ENCOUNTER — OFFICE VISIT (OUTPATIENT)
Dept: LAB | Facility: HOSPITAL | Age: 61
End: 2021-10-20
Attending: OPHTHALMOLOGY
Payer: COMMERCIAL

## 2021-10-20 ENCOUNTER — TELEPHONE (OUTPATIENT)
Dept: FAMILY MEDICINE CLINIC | Facility: CLINIC | Age: 61
End: 2021-10-20

## 2021-10-20 DIAGNOSIS — Z01.810 PRE-OPERATIVE CARDIOVASCULAR EXAMINATION: ICD-10-CM

## 2021-10-20 DIAGNOSIS — N18.4 CKD (CHRONIC KIDNEY DISEASE) STAGE 4, GFR 15-29 ML/MIN (HCC): ICD-10-CM

## 2021-10-20 LAB
ANION GAP SERPL CALCULATED.3IONS-SCNC: 8 MMOL/L (ref 5–14)
ATRIAL RATE: 61 BPM
BUN SERPL-MCNC: 67 MG/DL (ref 5–25)
CALCIUM SERPL-MCNC: 8.9 MG/DL (ref 8.4–10.2)
CHLORIDE SERPL-SCNC: 109 MMOL/L (ref 97–108)
CO2 SERPL-SCNC: 23 MMOL/L (ref 22–30)
CREAT SERPL-MCNC: 2.96 MG/DL (ref 0.6–1.2)
GFR SERPL CREATININE-BSD FRML MDRD: 17 ML/MIN/1.73SQ M
GLUCOSE P FAST SERPL-MCNC: 176 MG/DL (ref 70–99)
P AXIS: 39 DEGREES
POTASSIUM SERPL-SCNC: 4.9 MMOL/L (ref 3.6–5)
PR INTERVAL: 170 MS
QRS AXIS: -22 DEGREES
QRSD INTERVAL: 140 MS
QT INTERVAL: 500 MS
QTC INTERVAL: 503 MS
SODIUM SERPL-SCNC: 140 MMOL/L (ref 137–147)
T WAVE AXIS: 87 DEGREES
VENTRICULAR RATE: 61 BPM

## 2021-10-20 PROCEDURE — 93010 ELECTROCARDIOGRAM REPORT: CPT | Performed by: INTERNAL MEDICINE

## 2021-10-20 PROCEDURE — 36415 COLL VENOUS BLD VENIPUNCTURE: CPT

## 2021-10-20 PROCEDURE — 93005 ELECTROCARDIOGRAM TRACING: CPT

## 2021-10-20 PROCEDURE — 80048 BASIC METABOLIC PNL TOTAL CA: CPT

## 2021-10-21 ENCOUNTER — PATIENT OUTREACH (OUTPATIENT)
Dept: FAMILY MEDICINE CLINIC | Facility: CLINIC | Age: 61
End: 2021-10-21

## 2021-10-23 DIAGNOSIS — E11.42 TYPE 2 DIABETES MELLITUS WITH DIABETIC POLYNEUROPATHY, WITH LONG-TERM CURRENT USE OF INSULIN (HCC): ICD-10-CM

## 2021-10-23 DIAGNOSIS — Z79.4 TYPE 2 DIABETES MELLITUS WITH DIABETIC POLYNEUROPATHY, WITH LONG-TERM CURRENT USE OF INSULIN (HCC): ICD-10-CM

## 2021-10-25 DIAGNOSIS — Z98.1 S/P CERVICAL SPINAL FUSION: ICD-10-CM

## 2021-10-25 RX ORDER — DULAGLUTIDE 1.5 MG/.5ML
INJECTION, SOLUTION SUBCUTANEOUS
Qty: 9 ML | Refills: 1 | Status: SHIPPED | OUTPATIENT
Start: 2021-10-25 | End: 2021-12-13

## 2021-10-25 RX ORDER — GABAPENTIN 600 MG/1
600 TABLET ORAL 4 TIMES DAILY
Qty: 120 TABLET | Refills: 2 | Status: SHIPPED | OUTPATIENT
Start: 2021-10-25 | End: 2022-02-21

## 2021-10-26 ENCOUNTER — PATIENT OUTREACH (OUTPATIENT)
Dept: FAMILY MEDICINE CLINIC | Facility: CLINIC | Age: 61
End: 2021-10-26

## 2021-10-28 DIAGNOSIS — I10 ESSENTIAL HYPERTENSION: ICD-10-CM

## 2021-10-28 RX ORDER — AMLODIPINE BESYLATE 5 MG/1
TABLET ORAL
Qty: 30 TABLET | Refills: 0 | Status: SHIPPED | OUTPATIENT
Start: 2021-10-28 | End: 2021-12-03

## 2021-11-04 NOTE — PATIENT INSTRUCTIONS
Heart Healthy Diet   WHAT YOU NEED TO KNOW:   A heart healthy diet is an eating plan low in unhealthy fats and sodium (salt)  The plan is high in healthy fats and fiber  A heart healthy diet helps improve your cholesterol levels and lowers your risk for heart disease and stroke  A dietitian will teach you how to read and understand food labels  DISCHARGE INSTRUCTIONS:   Heart healthy diet guidelines to follow:   · Choose foods that contain healthy fats  ? Unsaturated fats  include monounsaturated and polyunsaturated fats  Unsaturated fat is found in foods such as soybean, canola, olive, corn, and safflower oils  It is also found in soft tub margarine that is made with liquid vegetable oil  ? Omega-3 fat  is found in certain fish, such as salmon, tuna, and trout, and in walnuts and flaxseed  Eat fish high in omega-3 fats at least 2 times a week  · Get 20 to 30 grams of fiber each day  Fruits, vegetables, whole-grain foods, and legumes (cooked beans) are good sources of fiber  · Limit or do not have unhealthy fats  ? Cholesterol  is found in animal foods, such as eggs and lobster, and in dairy products made from whole milk  Limit cholesterol to less than 200 mg each day  ? Saturated fat  is found in meats, such as monique and hamburger  It is also found in chicken or turkey skin, whole milk, and butter  Limit saturated fat to less than 7% of your total daily calories  ? Trans fat  is found in packaged foods, such as potato chips and cookies  It is also in hard margarine, some fried foods, and shortening  Do not eat foods that contain trans fats  · Limit sodium as directed  You may be told to limit sodium to 2,000 to 2,300 mg each day  Choose low-sodium or no-salt-added foods  Add little or no salt to food you prepare  Use herbs and spices in place of salt         Include the following in your heart healthy plan:  Ask your dietitian or healthcare provider how many servings to have Patient is fully vaccinated against Covid-19. Vaccine documented in Epic. No pre-op testing needed. from each of the following food groups:  · Grains:      ? Whole-wheat breads, cereals, and pastas, and brown rice    ? Low-fat, low-sodium crackers and chips    · Vegetables:      ? Broccoli, green beans, green peas, and spinach    ? Collards, kale, and lima beans    ? Carrots, sweet potatoes, tomatoes, and peppers    ? Canned vegetables with no salt added    · Fruits:      ? Bananas, peaches, pears, and pineapple    ? Grapes, raisins, and dates    ? Oranges, tangerines, grapefruit, orange juice, and grapefruit juice    ? Apricots, mangoes, melons, and papaya    ? Raspberries and strawberries    ? Canned fruit with no added sugar    · Low-fat dairy:      ? Nonfat (skim) milk, 1% milk, and low-fat almond, cashew, or soy milks fortified with calcium    ? Low-fat cheese, regular or frozen yogurt, and cottage cheese    · Meats and proteins:      ? Lean cuts of beef and pork (loin, leg, round), skinless chicken and turkey    ? Legumes, soy products, egg whites, or nuts    Limit or do not include the following in your heart healthy plan:   · Unhealthy fats and oils:      ? Whole or 2% milk, cream cheese, sour cream, or cheese    ? High-fat cuts of beef (T-bone steaks, ribs), chicken or turkey with skin, and organ meats such as liver    ? Butter, stick margarine, shortening, and cooking oils such as coconut or palm oil    · Foods and liquids high in sodium:      ? Packaged foods, such as frozen dinners, cookies, macaroni and cheese, and cereals with more than 300 mg of sodium per serving    ? Vegetables with added sodium, such as instant potatoes, vegetables with added sauces, or regular canned vegetables    ? Cured or smoked meats, such as hot dogs, monique, and sausage    ? High-sodium ketchup, barbecue sauce, salad dressing, pickles, olives, soy sauce, or miso    · Foods and liquids high in sugar:      ? Candy, cake, cookies, pies, or doughnuts    ? Soft drinks (soda), sports drinks, or sweetened tea    ?  Canned or dry mixes for cakes, soups, sauces, or gravies    Other healthy heart guidelines:   · Do not smoke  Nicotine and other chemicals in cigarettes and cigars can cause lung and heart damage  Ask your healthcare provider for information if you currently smoke and need help to quit  E-cigarettes or smokeless tobacco still contain nicotine  Talk to your healthcare provider before you use these products  · Limit or do not drink alcohol as directed  Alcohol can damage your heart and raise your blood pressure  Your healthcare provider may give you specific daily and weekly limits  The general recommended limit is 1 drink a day for women 21 or older and for men 72 or older  Do not have more than 3 drinks in a day or 7 in a week  The recommended limit is 2 drinks a day for men 24to 59years of age  Do not have more than 4 drinks in a day or 14 in a week  A drink of alcohol is 12 ounces of beer, 5 ounces of wine, or 1½ ounces of liquor  · Exercise regularly  Exercise can help you maintain a healthy weight and improve your blood pressure and cholesterol levels  Regular exercise can also decrease your risk for heart problems  Ask your healthcare provider about the best exercise plan for you  Do not start an exercise program without asking your healthcare provider  Follow up with your doctor or cardiologist as directed:  Write down your questions so you remember to ask them during your visits  © Copyright [address] Information is for End User's use only and may not be sold, redistributed or otherwise used for commercial purposes  All illustrations and images included in CareNotes® are the copyrighted property of A D A M [employer]  or [address] [place] [address]  The above information is an  only  It is not intended as medical advice for individual conditions or treatments  Talk to your doctor, nurse or pharmacist before following any medical regimen to see if it is safe and effective for you

## 2021-11-12 ENCOUNTER — PATIENT OUTREACH (OUTPATIENT)
Dept: FAMILY MEDICINE CLINIC | Facility: CLINIC | Age: 61
End: 2021-11-12

## 2021-11-27 DIAGNOSIS — M10.9 GOUT, UNSPECIFIED CAUSE, UNSPECIFIED CHRONICITY, UNSPECIFIED SITE: ICD-10-CM

## 2021-11-29 RX ORDER — ALLOPURINOL 300 MG/1
TABLET ORAL
Qty: 90 TABLET | Refills: 1 | Status: SHIPPED | OUTPATIENT
Start: 2021-11-29 | End: 2022-03-14

## 2021-11-30 ENCOUNTER — PATIENT OUTREACH (OUTPATIENT)
Dept: FAMILY MEDICINE CLINIC | Facility: CLINIC | Age: 61
End: 2021-11-30

## 2021-11-30 DIAGNOSIS — Z98.1 S/P CERVICAL SPINAL FUSION: ICD-10-CM

## 2021-11-30 DIAGNOSIS — L97.509 TYPE 2 DIABETES MELLITUS WITH FOOT ULCER, WITH LONG-TERM CURRENT USE OF INSULIN (HCC): ICD-10-CM

## 2021-11-30 DIAGNOSIS — M17.12 PRIMARY OSTEOARTHRITIS OF LEFT KNEE: ICD-10-CM

## 2021-11-30 DIAGNOSIS — E11.621 TYPE 2 DIABETES MELLITUS WITH FOOT ULCER, WITH LONG-TERM CURRENT USE OF INSULIN (HCC): ICD-10-CM

## 2021-11-30 DIAGNOSIS — Z79.4 TYPE 2 DIABETES MELLITUS WITH FOOT ULCER, WITH LONG-TERM CURRENT USE OF INSULIN (HCC): ICD-10-CM

## 2021-11-30 RX ORDER — INSULIN GLARGINE 100 [IU]/ML
50 INJECTION, SOLUTION SUBCUTANEOUS EVERY 12 HOURS SCHEDULED
Qty: 30 ML | Refills: 2 | Status: SHIPPED | OUTPATIENT
Start: 2021-11-30 | End: 2022-03-29 | Stop reason: SDUPTHER

## 2021-11-30 RX ORDER — HYDROCODONE BITARTRATE AND ACETAMINOPHEN 5; 325 MG/1; MG/1
1 TABLET ORAL 3 TIMES DAILY PRN
Qty: 90 TABLET | Refills: 0 | OUTPATIENT
Start: 2021-11-30

## 2021-12-01 DIAGNOSIS — I20.8 STABLE ANGINA (HCC): ICD-10-CM

## 2021-12-01 DIAGNOSIS — I25.10 CORONARY ARTERY DISEASE INVOLVING NATIVE HEART WITHOUT ANGINA PECTORIS, UNSPECIFIED VESSEL OR LESION TYPE: ICD-10-CM

## 2021-12-03 DIAGNOSIS — I10 ESSENTIAL HYPERTENSION: ICD-10-CM

## 2021-12-03 DIAGNOSIS — K21.9 GASTROESOPHAGEAL REFLUX DISEASE WITHOUT ESOPHAGITIS: ICD-10-CM

## 2021-12-03 RX ORDER — ISOSORBIDE MONONITRATE 30 MG/1
TABLET, EXTENDED RELEASE ORAL
Qty: 90 TABLET | Refills: 2 | Status: SHIPPED | OUTPATIENT
Start: 2021-12-03 | End: 2022-02-22

## 2021-12-03 RX ORDER — FAMOTIDINE 20 MG/1
TABLET, FILM COATED ORAL
Qty: 60 TABLET | Refills: 0 | Status: SHIPPED | OUTPATIENT
Start: 2021-12-03 | End: 2021-12-16

## 2021-12-03 RX ORDER — AMLODIPINE BESYLATE 5 MG/1
TABLET ORAL
Qty: 30 TABLET | Refills: 0 | Status: SHIPPED | OUTPATIENT
Start: 2021-12-03 | End: 2022-01-05

## 2021-12-05 DIAGNOSIS — N18.32 STAGE 3B CHRONIC KIDNEY DISEASE (HCC): ICD-10-CM

## 2021-12-05 DIAGNOSIS — E03.9 HYPOTHYROIDISM, UNSPECIFIED TYPE: ICD-10-CM

## 2021-12-05 DIAGNOSIS — Z79.4 TYPE 2 DIABETES MELLITUS WITH DIABETIC POLYNEUROPATHY, WITH LONG-TERM CURRENT USE OF INSULIN (HCC): ICD-10-CM

## 2021-12-05 DIAGNOSIS — I25.10 CORONARY ARTERY DISEASE INVOLVING NATIVE HEART WITHOUT ANGINA PECTORIS, UNSPECIFIED VESSEL OR LESION TYPE: ICD-10-CM

## 2021-12-05 DIAGNOSIS — Z79.4 CURRENT USE OF INSULIN (HCC): ICD-10-CM

## 2021-12-05 DIAGNOSIS — E11.42 TYPE 2 DIABETES MELLITUS WITH DIABETIC POLYNEUROPATHY, WITH LONG-TERM CURRENT USE OF INSULIN (HCC): ICD-10-CM

## 2021-12-06 RX ORDER — LEVOTHYROXINE SODIUM 0.05 MG/1
TABLET ORAL
Qty: 60 TABLET | Refills: 0 | Status: SHIPPED | OUTPATIENT
Start: 2021-12-06 | End: 2022-02-04

## 2021-12-06 RX ORDER — ATORVASTATIN CALCIUM 40 MG/1
TABLET, FILM COATED ORAL
Qty: 90 TABLET | Refills: 0 | Status: SHIPPED | OUTPATIENT
Start: 2021-12-06 | End: 2022-02-25

## 2021-12-13 ENCOUNTER — PATIENT OUTREACH (OUTPATIENT)
Dept: FAMILY MEDICINE CLINIC | Facility: CLINIC | Age: 61
End: 2021-12-13

## 2021-12-13 DIAGNOSIS — E11.42 TYPE 2 DIABETES MELLITUS WITH DIABETIC POLYNEUROPATHY, WITH LONG-TERM CURRENT USE OF INSULIN (HCC): ICD-10-CM

## 2021-12-13 DIAGNOSIS — Z79.4 TYPE 2 DIABETES MELLITUS WITH DIABETIC POLYNEUROPATHY, WITH LONG-TERM CURRENT USE OF INSULIN (HCC): ICD-10-CM

## 2021-12-13 RX ORDER — DULAGLUTIDE 1.5 MG/.5ML
INJECTION, SOLUTION SUBCUTANEOUS
Qty: 6 ML | Refills: 1 | Status: SHIPPED | OUTPATIENT
Start: 2021-12-13 | End: 2022-03-29 | Stop reason: SDUPTHER

## 2021-12-14 ENCOUNTER — PATIENT OUTREACH (OUTPATIENT)
Dept: FAMILY MEDICINE CLINIC | Facility: CLINIC | Age: 61
End: 2021-12-14

## 2021-12-14 DIAGNOSIS — Z79.4 TYPE 2 DIABETES MELLITUS WITH DIABETIC POLYNEUROPATHY, WITH LONG-TERM CURRENT USE OF INSULIN (HCC): ICD-10-CM

## 2021-12-14 DIAGNOSIS — D64.9 NORMOCHROMIC NORMOCYTIC ANEMIA: ICD-10-CM

## 2021-12-14 DIAGNOSIS — E11.42 TYPE 2 DIABETES MELLITUS WITH DIABETIC POLYNEUROPATHY, WITH LONG-TERM CURRENT USE OF INSULIN (HCC): ICD-10-CM

## 2021-12-14 DIAGNOSIS — Z79.4 TYPE 2 DIABETES MELLITUS WITH FOOT ULCER, WITH LONG-TERM CURRENT USE OF INSULIN (HCC): ICD-10-CM

## 2021-12-14 DIAGNOSIS — E11.621 TYPE 2 DIABETES MELLITUS WITH FOOT ULCER, WITH LONG-TERM CURRENT USE OF INSULIN (HCC): ICD-10-CM

## 2021-12-14 DIAGNOSIS — L97.509 TYPE 2 DIABETES MELLITUS WITH FOOT ULCER, WITH LONG-TERM CURRENT USE OF INSULIN (HCC): ICD-10-CM

## 2021-12-14 RX ORDER — PEN NEEDLE, DIABETIC 32 GX 1/4"
NEEDLE, DISPOSABLE MISCELLANEOUS 3 TIMES DAILY
Qty: 100 EACH | Refills: 5 | Status: SHIPPED | OUTPATIENT
Start: 2021-12-14 | End: 2022-03-29

## 2021-12-14 RX ORDER — INSULIN LISPRO 100 [IU]/ML
15 INJECTION, SOLUTION INTRAVENOUS; SUBCUTANEOUS
Qty: 15 ML | Refills: 5
Start: 2021-12-14 | End: 2022-02-25

## 2021-12-14 RX ORDER — FERROUS SULFATE 325(65) MG
325 TABLET ORAL EVERY OTHER DAY
Qty: 30 TABLET | Refills: 1 | Status: SHIPPED | OUTPATIENT
Start: 2021-12-14 | End: 2022-02-21

## 2021-12-15 ENCOUNTER — APPOINTMENT (OUTPATIENT)
Dept: LAB | Facility: HOSPITAL | Age: 61
End: 2021-12-15
Attending: INTERNAL MEDICINE
Payer: COMMERCIAL

## 2021-12-15 ENCOUNTER — OFFICE VISIT (OUTPATIENT)
Dept: NEPHROLOGY | Facility: CLINIC | Age: 61
End: 2021-12-15
Payer: COMMERCIAL

## 2021-12-15 VITALS
DIASTOLIC BLOOD PRESSURE: 78 MMHG | BODY MASS INDEX: 41.22 KG/M2 | HEART RATE: 70 BPM | WEIGHT: 272 LBS | HEIGHT: 68 IN | SYSTOLIC BLOOD PRESSURE: 130 MMHG | RESPIRATION RATE: 16 BRPM

## 2021-12-15 DIAGNOSIS — R80.8 OTHER PROTEINURIA: ICD-10-CM

## 2021-12-15 DIAGNOSIS — N18.9 CHRONIC KIDNEY DISEASE, UNSPECIFIED CKD STAGE: Primary | ICD-10-CM

## 2021-12-15 LAB
ANION GAP SERPL CALCULATED.3IONS-SCNC: 5 MMOL/L (ref 5–14)
BUN SERPL-MCNC: 70 MG/DL (ref 5–25)
CALCIUM SERPL-MCNC: 8.2 MG/DL (ref 8.4–10.2)
CHLORIDE SERPL-SCNC: 113 MMOL/L (ref 97–108)
CO2 SERPL-SCNC: 23 MMOL/L (ref 22–30)
CREAT SERPL-MCNC: 2.53 MG/DL (ref 0.6–1.2)
GFR SERPL CREATININE-BSD FRML MDRD: 20 ML/MIN/1.73SQ M
GLUCOSE P FAST SERPL-MCNC: 79 MG/DL (ref 70–99)
POTASSIUM SERPL-SCNC: 5 MMOL/L (ref 3.6–5)
SODIUM SERPL-SCNC: 141 MMOL/L (ref 137–147)

## 2021-12-15 PROCEDURE — 99214 OFFICE O/P EST MOD 30 MIN: CPT | Performed by: INTERNAL MEDICINE

## 2021-12-15 PROCEDURE — 80048 BASIC METABOLIC PNL TOTAL CA: CPT

## 2021-12-15 PROCEDURE — 36415 COLL VENOUS BLD VENIPUNCTURE: CPT

## 2021-12-16 ENCOUNTER — OFFICE VISIT (OUTPATIENT)
Dept: FAMILY MEDICINE CLINIC | Facility: CLINIC | Age: 61
End: 2021-12-16

## 2021-12-16 ENCOUNTER — TELEPHONE (OUTPATIENT)
Dept: NEPHROLOGY | Facility: CLINIC | Age: 61
End: 2021-12-16

## 2021-12-16 VITALS
WEIGHT: 276.3 LBS | SYSTOLIC BLOOD PRESSURE: 120 MMHG | OXYGEN SATURATION: 97 % | DIASTOLIC BLOOD PRESSURE: 86 MMHG | HEIGHT: 68 IN | TEMPERATURE: 97.3 F | RESPIRATION RATE: 20 BRPM | BODY MASS INDEX: 41.88 KG/M2 | HEART RATE: 84 BPM

## 2021-12-16 DIAGNOSIS — Z11.59 ENCOUNTER FOR HEPATITIS C SCREENING TEST FOR LOW RISK PATIENT: ICD-10-CM

## 2021-12-16 DIAGNOSIS — K21.9 GASTROESOPHAGEAL REFLUX DISEASE WITHOUT ESOPHAGITIS: ICD-10-CM

## 2021-12-16 DIAGNOSIS — E66.01 MORBID OBESITY WITH BMI OF 45.0-49.9, ADULT (HCC): ICD-10-CM

## 2021-12-16 DIAGNOSIS — R11.0 NAUSEA: ICD-10-CM

## 2021-12-16 DIAGNOSIS — Z98.1 S/P CERVICAL SPINAL FUSION: ICD-10-CM

## 2021-12-16 DIAGNOSIS — Z12.31 ENCOUNTER FOR SCREENING MAMMOGRAM FOR MALIGNANT NEOPLASM OF BREAST: ICD-10-CM

## 2021-12-16 DIAGNOSIS — Z11.4 ENCOUNTER FOR SCREENING FOR HIV: ICD-10-CM

## 2021-12-16 DIAGNOSIS — I10 PRIMARY HYPERTENSION: ICD-10-CM

## 2021-12-16 DIAGNOSIS — I25.10 CORONARY ARTERY DISEASE INVOLVING NATIVE HEART WITHOUT ANGINA PECTORIS, UNSPECIFIED VESSEL OR LESION TYPE: ICD-10-CM

## 2021-12-16 DIAGNOSIS — E11.42 TYPE 2 DIABETES MELLITUS WITH DIABETIC POLYNEUROPATHY, WITH LONG-TERM CURRENT USE OF INSULIN (HCC): Primary | ICD-10-CM

## 2021-12-16 DIAGNOSIS — Z23 ENCOUNTER FOR IMMUNIZATION: ICD-10-CM

## 2021-12-16 DIAGNOSIS — Z79.4 CURRENT USE OF INSULIN (HCC): ICD-10-CM

## 2021-12-16 DIAGNOSIS — N18.9 CHRONIC KIDNEY DISEASE, UNSPECIFIED CKD STAGE: ICD-10-CM

## 2021-12-16 DIAGNOSIS — N18.32 STAGE 3B CHRONIC KIDNEY DISEASE (HCC): ICD-10-CM

## 2021-12-16 DIAGNOSIS — Z79.4 TYPE 2 DIABETES MELLITUS WITH DIABETIC POLYNEUROPATHY, WITH LONG-TERM CURRENT USE OF INSULIN (HCC): Primary | ICD-10-CM

## 2021-12-16 PROBLEM — Z00.00 HEALTHCARE MAINTENANCE: Status: RESOLVED | Noted: 2020-09-02 | Resolved: 2021-12-16

## 2021-12-16 PROBLEM — E83.51 HYPOCALCEMIA: Status: RESOLVED | Noted: 2021-08-24 | Resolved: 2021-12-16

## 2021-12-16 LAB — SL AMB POCT HEMOGLOBIN AIC: 8.5 (ref ?–6.5)

## 2021-12-16 PROCEDURE — 99214 OFFICE O/P EST MOD 30 MIN: CPT | Performed by: NURSE PRACTITIONER

## 2021-12-16 PROCEDURE — 90670 PCV13 VACCINE IM: CPT | Performed by: NURSE PRACTITIONER

## 2021-12-16 PROCEDURE — G0009 ADMIN PNEUMOCOCCAL VACCINE: HCPCS | Performed by: NURSE PRACTITIONER

## 2021-12-16 PROCEDURE — 83036 HEMOGLOBIN GLYCOSYLATED A1C: CPT | Performed by: NURSE PRACTITIONER

## 2021-12-16 RX ORDER — TIZANIDINE 2 MG/1
2 TABLET ORAL EVERY 8 HOURS PRN
Qty: 90 TABLET | Refills: 0 | Status: SHIPPED | OUTPATIENT
Start: 2021-12-16 | End: 2022-02-21

## 2021-12-16 RX ORDER — BLOOD SUGAR DIAGNOSTIC
1 STRIP MISCELLANEOUS DAILY
Qty: 100 EACH | Refills: 2 | Status: SHIPPED | OUTPATIENT
Start: 2021-12-16 | End: 2022-02-21

## 2021-12-16 RX ORDER — PANTOPRAZOLE SODIUM 40 MG/1
40 TABLET, DELAYED RELEASE ORAL DAILY
Qty: 90 TABLET | Refills: 0 | Status: SHIPPED | OUTPATIENT
Start: 2021-12-16 | End: 2022-01-11

## 2021-12-16 RX ORDER — ONDANSETRON 4 MG/1
4 TABLET, FILM COATED ORAL EVERY 8 HOURS PRN
Qty: 20 TABLET | Refills: 0 | Status: SHIPPED | OUTPATIENT
Start: 2021-12-16 | End: 2022-07-02

## 2021-12-30 ENCOUNTER — PATIENT OUTREACH (OUTPATIENT)
Dept: FAMILY MEDICINE CLINIC | Facility: CLINIC | Age: 61
End: 2021-12-30

## 2021-12-31 DIAGNOSIS — E11.42 TYPE 2 DIABETES MELLITUS WITH DIABETIC POLYNEUROPATHY, WITH LONG-TERM CURRENT USE OF INSULIN (HCC): ICD-10-CM

## 2021-12-31 DIAGNOSIS — Z79.4 CURRENT USE OF INSULIN (HCC): ICD-10-CM

## 2021-12-31 DIAGNOSIS — N18.32 STAGE 3B CHRONIC KIDNEY DISEASE (HCC): ICD-10-CM

## 2021-12-31 DIAGNOSIS — Z79.4 TYPE 2 DIABETES MELLITUS WITH DIABETIC POLYNEUROPATHY, WITH LONG-TERM CURRENT USE OF INSULIN (HCC): ICD-10-CM

## 2021-12-31 DIAGNOSIS — I25.10 CORONARY ARTERY DISEASE INVOLVING NATIVE HEART WITHOUT ANGINA PECTORIS, UNSPECIFIED VESSEL OR LESION TYPE: ICD-10-CM

## 2022-01-01 DIAGNOSIS — I25.10 CORONARY ARTERY DISEASE INVOLVING NATIVE HEART WITHOUT ANGINA PECTORIS, UNSPECIFIED VESSEL OR LESION TYPE: ICD-10-CM

## 2022-01-01 DIAGNOSIS — Z79.4 TYPE 2 DIABETES MELLITUS WITH DIABETIC POLYNEUROPATHY, WITH LONG-TERM CURRENT USE OF INSULIN (HCC): ICD-10-CM

## 2022-01-01 DIAGNOSIS — E11.42 TYPE 2 DIABETES MELLITUS WITH DIABETIC POLYNEUROPATHY, WITH LONG-TERM CURRENT USE OF INSULIN (HCC): ICD-10-CM

## 2022-01-01 DIAGNOSIS — N18.32 STAGE 3B CHRONIC KIDNEY DISEASE (HCC): ICD-10-CM

## 2022-01-01 DIAGNOSIS — Z79.4 CURRENT USE OF INSULIN (HCC): ICD-10-CM

## 2022-01-03 RX ORDER — ASPIRIN 81 MG/1
TABLET ORAL
Qty: 90 TABLET | Refills: 0 | Status: SHIPPED | OUTPATIENT
Start: 2022-01-03 | End: 2022-03-29 | Stop reason: SDUPTHER

## 2022-01-03 RX ORDER — CLOPIDOGREL BISULFATE 75 MG/1
TABLET ORAL
Qty: 90 TABLET | Refills: 1 | Status: SHIPPED | OUTPATIENT
Start: 2022-01-03 | End: 2022-03-29 | Stop reason: SDUPTHER

## 2022-01-05 DIAGNOSIS — I10 ESSENTIAL HYPERTENSION: ICD-10-CM

## 2022-01-05 RX ORDER — AMLODIPINE BESYLATE 5 MG/1
TABLET ORAL
Qty: 30 TABLET | Refills: 0 | Status: SHIPPED | OUTPATIENT
Start: 2022-01-05 | End: 2022-01-31

## 2022-01-06 ENCOUNTER — TELEMEDICINE (OUTPATIENT)
Dept: FAMILY MEDICINE CLINIC | Facility: CLINIC | Age: 62
End: 2022-01-06

## 2022-01-06 ENCOUNTER — PATIENT OUTREACH (OUTPATIENT)
Dept: FAMILY MEDICINE CLINIC | Facility: CLINIC | Age: 62
End: 2022-01-06

## 2022-01-06 DIAGNOSIS — I50.9 NEW ONSET OF CONGESTIVE HEART FAILURE (HCC): ICD-10-CM

## 2022-01-06 PROCEDURE — 99213 OFFICE O/P EST LOW 20 MIN: CPT | Performed by: NURSE PRACTITIONER

## 2022-01-06 RX ORDER — TORSEMIDE 20 MG/1
20 TABLET ORAL DAILY
Qty: 90 TABLET | Refills: 0 | Status: SHIPPED | OUTPATIENT
Start: 2022-01-06 | End: 2022-02-16 | Stop reason: HOSPADM

## 2022-01-06 NOTE — PROGRESS NOTES
I received a call from the patient stating she has a scar on her right shin which was from a sore that did not heal   She notes there is now a blister at the site which is oozing clear fluid for 2 days  She did report her legs are swollen  She has not been keeping track of her daily weights but notes yesterday's reading was 288 (12/16 office visit weight was 276)  The patient denies chest pain or shortness of breath  She states she is taking her diuretic as prescribed (torsemide 20mg qd)  The patient reports constipation  She notes she had a small bowel movement this morning but this was the first time in a week  She stated she took Colace yesterday  The patient stated she still needs to get her Covid booster and shingles vaccine and will try to get them at her pharmacy  I will continue to follow

## 2022-01-06 NOTE — PROGRESS NOTES
Virtual Brief Visit    Patient is located in the following state in which I hold an active license [place]      Assessment/Plan:    Problem List Items Addressed This Visit        Cardiovascular and Mediastinum    New onset of congestive heart failure (HCC)    Relevant Medications    torsemide (DEMADEX) 20 mg tablet        Patient presents for virtual visit due to edema of the BLLE  She weighed herself today and is 10 pounds heavier than last office visit  On chart review it appears that she is out of torsemide  Asked her to check the medications she has in her cabinet, she indeed does not have torsemide  This is likely the explanation for her increased lower extremity edema  Medication sent to the pharmacy  Discussed with patient that if sx worsen to proceed to the ED  She states that she understands and agrees with plan  Recent Visits  No visits were found meeting these conditions  Showing recent visits within past 7 days and meeting all other requirements  Today's Visits  Date Type Provider Dept   01/06/22 [address] [place] [address] today's visits and meeting all other requirements  Future Appointments  No visits were found meeting these conditions  Showing future appointments within next 150 days and meeting all other requirements       Review of Systems   Constitutional: Negative for fatigue and fever  HENT: Negative for congestion  Respiratory: Negative for cough and shortness of breath  Cardiovascular: Positive for leg swelling  Negative for chest pain and palpitations  Neurological: Negative for dizziness  Physical Exam  Constitutional:       General: She is not in acute distress  Pulmonary:      Effort: No respiratory distress  Neurological:      Mental Status: She is alert and oriented to person, place, and time             I spent 15 minutes directly with the patient during this visit

## 2022-01-09 DIAGNOSIS — I25.10 CORONARY ARTERY DISEASE INVOLVING NATIVE HEART WITHOUT ANGINA PECTORIS, UNSPECIFIED VESSEL OR LESION TYPE: ICD-10-CM

## 2022-01-09 DIAGNOSIS — N18.32 STAGE 3B CHRONIC KIDNEY DISEASE (HCC): ICD-10-CM

## 2022-01-09 DIAGNOSIS — E11.42 TYPE 2 DIABETES MELLITUS WITH DIABETIC POLYNEUROPATHY, WITH LONG-TERM CURRENT USE OF INSULIN (HCC): ICD-10-CM

## 2022-01-09 DIAGNOSIS — Z79.4 TYPE 2 DIABETES MELLITUS WITH DIABETIC POLYNEUROPATHY, WITH LONG-TERM CURRENT USE OF INSULIN (HCC): ICD-10-CM

## 2022-01-09 DIAGNOSIS — Z79.4 CURRENT USE OF INSULIN (HCC): ICD-10-CM

## 2022-01-10 ENCOUNTER — PATIENT OUTREACH (OUTPATIENT)
Dept: FAMILY MEDICINE CLINIC | Facility: CLINIC | Age: 62
End: 2022-01-10

## 2022-01-10 NOTE — PROGRESS NOTES
I called the patient to remind her of her GI appointment for tomorrow at 1120; she was aware and plans on attending  The patient reports her leg "blister" is improving stating "it's drying out"  She notes her legs are "a little swollen"  I again recommended she elevate her legs often and keep a strict watch on her salt intake  She did not check her weight yet today but reports yesterday's showed a decrease of 1 lb  The patient denies chest pain or shortness of breath  She states she thought she had been taking a diuretic as per last outreach  The patient started her torsemide on Saturday  She will call with any questions or concerns  I will follow up next week

## 2022-01-11 ENCOUNTER — TELEPHONE (OUTPATIENT)
Dept: GASTROENTEROLOGY | Facility: MEDICAL CENTER | Age: 62
End: 2022-01-11

## 2022-01-11 ENCOUNTER — CONSULT (OUTPATIENT)
Dept: GASTROENTEROLOGY | Facility: MEDICAL CENTER | Age: 62
End: 2022-01-11
Payer: COMMERCIAL

## 2022-01-11 VITALS — TEMPERATURE: 97 F | DIASTOLIC BLOOD PRESSURE: 66 MMHG | HEART RATE: 71 BPM | SYSTOLIC BLOOD PRESSURE: 127 MMHG

## 2022-01-11 DIAGNOSIS — K59.00 CONSTIPATION, UNSPECIFIED CONSTIPATION TYPE: ICD-10-CM

## 2022-01-11 DIAGNOSIS — K21.9 GASTROESOPHAGEAL REFLUX DISEASE WITHOUT ESOPHAGITIS: Primary | ICD-10-CM

## 2022-01-11 DIAGNOSIS — D64.9 ANEMIA, UNSPECIFIED TYPE: ICD-10-CM

## 2022-01-11 PROCEDURE — 99204 OFFICE O/P NEW MOD 45 MIN: CPT | Performed by: INTERNAL MEDICINE

## 2022-01-11 RX ORDER — POLYETHYLENE GLYCOL 3350 17 G/17G
17 POWDER, FOR SOLUTION ORAL DAILY
Qty: 255 G | Refills: 1 | Status: SHIPPED | OUTPATIENT
Start: 2022-01-11 | End: 2022-03-29 | Stop reason: SDUPTHER

## 2022-01-11 RX ORDER — CARVEDILOL 25 MG/1
TABLET ORAL
Qty: 180 TABLET | Refills: 1 | Status: SHIPPED | OUTPATIENT
Start: 2022-01-11 | End: 2022-02-16 | Stop reason: HOSPADM

## 2022-01-11 RX ORDER — PANTOPRAZOLE SODIUM 40 MG/1
40 TABLET, DELAYED RELEASE ORAL 2 TIMES DAILY
Qty: 60 TABLET | Refills: 3 | Status: SHIPPED | OUTPATIENT
Start: 2022-01-11 | End: 2022-02-21

## 2022-01-11 NOTE — PROGRESS NOTES
Kendra Celaya [facility] - Outpatient Consultation  Eric Peña 63 y o  female MRN: [id number]  Encounter: [id number]          ASSESSMENT AND PLAN:  61-year-old female with history of GERD, type 2 diabetes, coronary artery disease status post PCI on aspirin Plavix, hypertension, CHF who presents for evaluation    1  Gastroesophageal reflux disease without esophagitis  She reports history of chronic reflux, nausea, intermittent abdominal bloating  She is currently on pantoprazole once daily  I discussed dietary/lifestyle anti-reflux measures with her today  I recommended she increase her pantoprazole twice daily to be taken 30-60 minutes before breakfast and dinner  Symptoms may be related to gastroesophageal reflux, gastritis or gastroparesis given her type 2 insulin-dependent diabetes  EGD will be performed for evaluation for screening Burr's esophagus and to obtain gastric biopsies  If her symptoms are not improved on twice daily PPI EGD is unremarkable consider gastric emptying study  - EGD; Future  - pantoprazole (PROTONIX) 40 mg tablet; Take 1 tablet (40 mg total) by mouth 2 (two) times a day  Dispense: 60 tablet; Refill: 3    2  Constipation, unspecified constipation type  She reports history of chronic constipation  I discussed constipation management with her today  I recommended she start MiraLax once daily and increase or decrease as needed to have a soft, formed bowel movement per day  - polyethylene glycol (GLYCOLAX) 17 GM/SCOOP powder; Take 17 g by mouth daily  Dispense: 255 g; Refill: 1    3  Anemia, unspecified type  She had normocytic anemia seen on labs from July 2021  She has no iron studies available for review  She has multiple risk factors for anemia including chronic disease but also consider slow GI blood loss  EGD colonoscopy performed for assessment for anemia to obtain gastric, duodenal biopsies    She is also overdue for colonoscopy for colon cancer screening and this will be performed at the same time  - polyethylene glycol (GOLYTELY) 4000 mL solution; Take as instructed on bowel preparation instructions  Dispense: 4000 mL; Refill: 0  - bisacodyl (DULCOLAX) 5 mg EC tablet; Take as instructed on bowel preparation instructions  Dispense: 2 tablet; Refill: 0  - Colonoscopy; Future  - EGD; Future    Follow-up after procedures  ______________________________________________________________________    HPI:  44-year-old female with history of GERD, type 2 diabetes, coronary artery disease status post PCI on aspirin Plavix, hypertension, CHF who presents for evaluation    She reports chronic symptoms of gastroesophageal reflux, nausea upon waking  Her nausea was occurring with certain types of foods like spicy and fatty foods which she has tried to avoid  She has substernal chest discomfort and intermittent regurgitation  She has nausea but does not vomit  She takes Protonix once daily in the morning  Her appetite is good now but she previously lost 35 pounds intentionally 3 months ago and then regained the weight  She reports chronic constipation with hard difficult to pass bowel movements usually once per week  She denies melena or hematochezia  At times she uses stool softeners with relief of her symptoms  Her past surgical history includes cholecystectomy   She reports having a endoscopy 2 times the past many years ago and believes he were normal   She has no prior colonoscopy   She reports an aunt with history of a gastrointestinal cancer but is unsure what kind    12/21 hemoglobin A1c is 8 5  09/2021 hemoglobin is 9 with MCV of 97  08/2021 liver function tests are normal     06/2021 CT abdomen pelvis shows benign left adrenal adenoma    REVIEW OF SYSTEMS:    CONSTITUTIONAL: Denies any fever, chills, rigors, and weight loss  HEENT: No earache or tinnitus  Denies hearing loss or visual disturbances  CARDIOVASCULAR: No chest pain or palpitations  RESPIRATORY: Denies any cough, hemoptysis, shortness of breath or dyspnea on exertion  GASTROINTESTINAL: As noted in the History of Present Illness  GENITOURINARY: No problems with urination  Denies any hematuria or dysuria  NEUROLOGIC: No dizziness or vertigo, denies headaches  MUSCULOSKELETAL: Denies any muscle or joint pain  SKIN: Denies skin rashes or itching  ENDOCRINE: Denies excessive thirst  Denies intolerance to heat or cold  PSYCHOSOCIAL: Denies depression or anxiety  Denies any recent memory loss         Historical Information   Past Medical History:   Diagnosis Date    Abnormal liver function     Anemia     Anxiety     Arthritis     Chronic kidney disease     stage 3    Chronic narcotic dependence (HCC)     Chronic pain disorder     lower back, hands , neck and knees    Coronary artery disease     Depression     Diabetes mellitus ([facility] [address] )     GERD (gastroesophageal reflux disease)     no meds at present    Heart murmur     murmur    Hyperlipidemia     Hypertension     Neurogenic bladder     Open toe wound 12/2020    right big toe open calus but is dry at present    Renal disorder     Shortness of breath     exertion    Sleep apnea     doesn't use cpap    Suprapubic catheter [facility])      Past Surgical History:   Procedure Laterality Date    AMPUTATION      ANGIOPLASTY  2017    5    BREAST EXCISIONAL BIOPSY Left     BREAST SURGERY      CARDIAC CATHETERIZATION      CARPAL TUNNEL RELEASE Bilateral     CERVICAL FUSION      HYSTERECTOMY  2008    IR SUPRAPUBIC CATHETER PLACEMENT  6/15/2021    KNEE SURGERY      OOPHORECTOMY  2008    VT EXC SKIN BENIG 3 1-4 CM REMAINDR BODY N/A 12/21/2020    Procedure: EXCISION SEBACEOUS CYST X 5 SCALP;  Surgeon: Devendra Simon MD;  Location:  MAIN OR;  Service: General    TOE AMPUTATION Left     TRIGGER FINGER RELEASE Right     4th Finger     Social History   Social History     Substance and Sexual Activity   Alcohol Use Not Currently Social History     Substance and Sexual Activity   Drug Use Never     Social History     Tobacco Use   Smoking Status Former Smoker    Packs/day: 1 00    Years: 35 00    Pack years: 35 00    Types: Cigarettes    Quit date: 2012    Years since quitting: 10 0   Smokeless Tobacco Never Used     Family History   Problem Relation Age of Onset    Stroke Father     Heart disease Father     No Known Problems Mother     No Known Problems Sister     No Known Problems Daughter     No Known Problems Maternal Grandmother     No Known Problems Maternal Grandfather     No Known Problems Paternal Grandmother     No Known Problems Paternal Grandfather     No Known Problems Maternal Aunt     No Known Problems Maternal Aunt     No Known Problems Maternal Aunt     No Known Problems Maternal Aunt     No Known Problems Maternal Aunt     No Known Problems Maternal Aunt     No Known Problems Paternal Aunt        Meds/Allergies       Current Outpatient Medications:     allopurinol (ZYLOPRIM) 300 mg tablet    amLODIPine (NORVASC) 5 mg tablet    aspirin (ECOTRIN LOW STRENGTH) 81 mg EC tablet    atorvastatin (LIPITOR) 40 mg tablet    carvedilol (COREG) 25 mg tablet    citalopram (CeleXA) 40 mg tablet    clopidogrel (PLAVIX) 75 mg tablet    collagenase (SANTYL) ointment    Continuous Blood Gluc  (My Damn Channelyle Ira[place] 2 [place]) YOUNG    Continuous Blood Gluc Sensor ([email or web address] Iraida 14 Day Sensor) MISC    Diclofenac Sodium (VOLTAREN) 1 %    diphenhydrAMINE (BENADRYL) 25 mg capsule    docusate sodium (COLACE) 100 mg capsule    ferrous sulfate 325 (65 Fe) mg tablet    gabapentin (NEURONTIN) 600 MG tablet    glucose blood (OneTouch Ultra) test strip    HYDROcodone-acetaminophen (NORCO) 5-325 mg per tablet    insulin glargine (Lantus SoloStar) 100 units/mL injection pen    insulin lispro (HumaLOG KwikPen) 100 units/mL injection pen    Insulin Pen Needle (BD Pen Needle Micro U/F) 32G X 6 MM MISC   Insulin Syringe-Needle U-100 (BD Veo Insulin Syringe U/F) 31G X 15/64" 0 5 ML MISC    isosorbide mononitrate (IMDUR) 30 mg 24 hr tablet    isosorbide mononitrate (IMDUR) 60 mg 24 hr tablet    Lancets (OneTouch Delica Plus [id number]) MISC    levothyroxine 50 mcg tablet    naloxone (NARCAN) 4 mg/0 1 mL nasal spray    nitroglycerin (NITROSTAT) 0 4 mg SL tablet    nystatin (MYCOSTATIN) powder    OLANZapine (ZyPREXA) 5 mg tablet    ondansetron (ZOFRAN) 4 mg tablet    pantoprazole (PROTONIX) 40 mg tablet    silver sulfadiazine (SILVADENE,SSD) 1 % cream    sucralfate (CARAFATE) 1 g/10 mL suspension    tiZANidine (ZANAFLEX) 2 mg tablet    torsemide (DEMADEX) 20 mg tablet    Trulicity 1 5 PG/4 6OA SOPN    Allergies   Allergen Reactions    Codeine      Other reaction(s): Nausea and Vomiting    Latex Itching           Objective     Blood pressure 127/66, pulse 71, temperature (!) 97 °F (36 1 °C), temperature source Tympanic, not currently breastfeeding  There is no height or weight on file to calculate BMI  PHYSICAL EXAM:      General Appearance:   Alert, a older, chronically ill-appearing cooperative, no distress   HEENT:   Normocephalic, atraumatic, anicteric  Neck:  Supple, symmetrical, trachea midline   Lungs:   Clear to auscultation bilaterally; no rales, rhonchi or wheezing; respirations unlabored    Heart[de-identified]   Regular rate and rhythm; no murmur, rub, or gallop  Abdomen:   Soft, non-tender, non-distended; normal bowel sounds; no masses, no organomegaly    Genitalia:   Deferred    Rectal:   Deferred    Extremities:  No cyanosis, clubbing or edema    Pulses:  2+ and symmetric    Skin:  No jaundice, rashes, or lesions    Lymph nodes:  No palpable cervical lymphadenopathy        Lab Results:   No visits with results within 1 Day(s) from this visit     Latest known visit with results is:   Office Visit on 12/16/2021   Component Date Value    Hemoglobin A1C 12/16/2021 8 5*         Radiology Results:   No results found

## 2022-01-11 NOTE — TELEPHONE ENCOUNTER
Our mutual patient is scheduled for procedure: colon and EGD    On:  03/08/2022     With: Dr Marie Pitts    Patient is in need of cardiac clearance for upcoming procedure      Physician Approving clearance: ________________________

## 2022-01-11 NOTE — PATIENT INSTRUCTIONS
Procedure: colon & EGD  Scheduled date of procedure (as of today): 03/08/2022  Physician performing procedure: Dr Dusty Miller  Location of procedure: [facility]  Instructions reviewed with patient by: [email or web address]   Clearances: cardiac clearance sent to Dr Nelma Phoenix

## 2022-01-17 NOTE — TELEPHONE ENCOUNTER
She has underlying renal dysfunction, diabetes, hypertension and coronary artery disease with a chronic 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  Was having angina type symptoms prior, however last time I saw in September 2021 her were under control with medical therapy  Would plan for follow-up in the office for preoperative risk assessment and ECG prior to colonoscopy and EGD    I messaged my office staff to schedule her for follow-up appointment prior to EGD and colonoscopy

## 2022-01-17 NOTE — TELEPHONE ENCOUNTER
Hi Dr Odessa Greenberg,    Just an [address] on this patient scheduled for 3/8 -- I will follow up on this after her appointment at Dr Hugo Cotto office for risk assessment  Thanks!

## 2022-01-18 ENCOUNTER — PATIENT OUTREACH (OUTPATIENT)
Dept: FAMILY MEDICINE CLINIC | Facility: CLINIC | Age: 62
End: 2022-01-18

## 2022-01-18 DIAGNOSIS — Z59.9 INADEQUATE COMMUNITY RESOURCES: Primary | ICD-10-CM

## 2022-01-18 SDOH — ECONOMIC STABILITY - INCOME SECURITY: PROBLEM RELATED TO HOUSING AND ECONOMIC CIRCUMSTANCES, UNSPECIFIED: Z59.9

## 2022-01-18 NOTE — PROGRESS NOTES
I called the patient to remind her of her Endo appointment for tomorrow which she was unaware but plans on attending  The patient noted her insurance informed her that it does not accept our office any longer and the patient would need to find another PCP  Referral placed for CHW to assist     The patient just woke up a bit ago and did not check her blood sugar or weight  I will continue to follow

## 2022-01-24 ENCOUNTER — PATIENT OUTREACH (OUTPATIENT)
Dept: FAMILY MEDICINE CLINIC | Facility: CLINIC | Age: 62
End: 2022-01-24

## 2022-01-24 NOTE — PROGRESS NOTES
Outgoing Call:  1/24/2022    CHW did call pt to discuss referral received for interest in switching insurance companies or seeking a new PCP  CHW has previously worked with pt and she did remember CHW  CHW assessment was completed  CHW informed pt she will be in touch with her next as she is away for remainder of week  Pt agreed  Next outreach is scheduled for 1/31/2022

## 2022-01-29 DIAGNOSIS — I10 ESSENTIAL HYPERTENSION: ICD-10-CM

## 2022-01-31 RX ORDER — AMLODIPINE BESYLATE 5 MG/1
TABLET ORAL
Qty: 30 TABLET | Refills: 0 | Status: ON HOLD | OUTPATIENT
Start: 2022-01-31 | End: 2022-02-14

## 2022-02-03 ENCOUNTER — PATIENT OUTREACH (OUTPATIENT)
Dept: FAMILY MEDICINE CLINIC | Facility: CLINIC | Age: 62
End: 2022-02-03

## 2022-02-03 NOTE — PROGRESS NOTES
I called the patient but received voicemail  Message was left stating I will call her back at another time

## 2022-02-03 NOTE — PROGRESS NOTES
Outgoing Call / Lien Bouchra:  2/3/2022    CHW did call pt to discuss health insurance / PCP concerns  Voice message left and letter sent  Next outreach is scheduled for 2/7/2022

## 2022-02-03 NOTE — LETTER
02/03/22    Dear Rhys Villatoro,    I am a [facility] with [address]  [facility]  [address] [address]    I have made several attempts to call you by phone  It is important that you contact me back at [phone] so that I can assist with your care needs       Sincerely,         Jose Luis Fulton, DEANW

## 2022-02-04 ENCOUNTER — PATIENT OUTREACH (OUTPATIENT)
Dept: FAMILY MEDICINE CLINIC | Facility: CLINIC | Age: 62
End: 2022-02-04

## 2022-02-04 DIAGNOSIS — E03.9 HYPOTHYROIDISM, UNSPECIFIED TYPE: ICD-10-CM

## 2022-02-04 RX ORDER — LEVOTHYROXINE SODIUM 0.05 MG/1
TABLET ORAL
Qty: 60 TABLET | Refills: 0 | Status: SHIPPED | OUTPATIENT
Start: 2022-02-04

## 2022-02-04 NOTE — PROGRESS NOTES
I called the patient to follow up  She states she has been feeling well  She reports her fasting sugars have been stable with yesterday's reading of 123  She notes she is taking her medications as prescribed and she is watching her diet  The patient also reports her weight is remaining stable ~278  She states she is watching her sodium and elevating her legs frequently  The patient notes she is saving up for a recliner so she can elevate her legs more often  She reports the "blister" on her leg continues to heal     The patient is concerned about losing her PCP because of insurance issues  KATE Guerra, will be contacting the patient next week to assist the patient  The patient agreed for me to call her prior to her Cardiology appointment in a few weeks as a reminder

## 2022-02-07 ENCOUNTER — PATIENT OUTREACH (OUTPATIENT)
Dept: FAMILY MEDICINE CLINIC | Facility: CLINIC | Age: 62
End: 2022-02-07

## 2022-02-07 NOTE — PROGRESS NOTES
Outgoing Calls:  2/7/2022    CHW did call pt to discuss concerns about her health insurance provider covering costs to continue meeting with Isabelle Marlow  CHW did facilitate a three way call to [address] and were assisted by Cherie Field who stated that currently pt will be able to continue receiving her care at [facility]  They are within network  Pt was happy to hear this  Pt was also informed that every year she is entitled to 60 one way trips to all medical appointments, $35 food card benefits every month, $200 in OTC medications every three months  Pt is aware of this and thanked Polina for explaining this  A welcome packet has been mailed to pt  This referral will be closed as pt no longer has concerns about health insurance  No further outreach is scheduled

## 2022-02-08 ENCOUNTER — PATIENT OUTREACH (OUTPATIENT)
Dept: FAMILY MEDICINE CLINIC | Facility: CLINIC | Age: 62
End: 2022-02-08

## 2022-02-08 NOTE — PROGRESS NOTES
I returned the patient's call  She states she has "bad indigestion"  She notes she is already taking pantoprazole twice daily without any relief  The patient reports burning and infrequent nausea  She states she is able to eat and drink  She is requesting an appointment with her PCP; note sent to clerical  In the meantime, I asked her to avoid eating spicy and acidic foods and she states she has been  Of note, patient is scheduled for an EGD next month  I will continue to follow

## 2022-02-09 ENCOUNTER — PATIENT OUTREACH (OUTPATIENT)
Dept: FAMILY MEDICINE CLINIC | Facility: CLINIC | Age: 62
End: 2022-02-09

## 2022-02-09 NOTE — PROGRESS NOTES
I called the patient to inform her that PCP did not want to increase diuretics at this time due to no recent check of patient's kidney function  PCP advised the patient to go to the ED which I again stressed to the patient  In the meantime the patient will continue taking her torsemide, elevating her legs and watching her sodium intake  The patient is also scheduled for a PCP appointment for tomorrow morning but does understand ED precautions

## 2022-02-09 NOTE — PROGRESS NOTES
I received a call from the patient stating she weighed herself today and it was 298  She states this is high and admits to not keeping up on daily weights  I asked her if the weight she gave me on 2/4 (278) was accurate and she was unsure  The patient is requesting to take extra diuretics; will send note to PCP  The patient reports leg swelling but denies chest pain  She noted she did have shortness of breath yesterday but denies any currently  She stated she ate [employer] food yesterday which I informed her is full of sodium and she needs to keep a strict watch of this  I provided ED precautions and she understood  I will continue to follow

## 2022-02-10 ENCOUNTER — APPOINTMENT (EMERGENCY)
Dept: RADIOLOGY | Facility: HOSPITAL | Age: 62
DRG: 682 | End: 2022-02-10
Payer: COMMERCIAL

## 2022-02-10 ENCOUNTER — OFFICE VISIT (OUTPATIENT)
Dept: FAMILY MEDICINE CLINIC | Facility: CLINIC | Age: 62
End: 2022-02-10

## 2022-02-10 ENCOUNTER — HOSPITAL ENCOUNTER (INPATIENT)
Facility: HOSPITAL | Age: 62
LOS: 6 days | Discharge: HOME HEALTH | DRG: 682 | End: 2022-02-16
Attending: EMERGENCY MEDICINE | Admitting: INTERNAL MEDICINE
Payer: COMMERCIAL

## 2022-02-10 VITALS
DIASTOLIC BLOOD PRESSURE: 90 MMHG | RESPIRATION RATE: 18 BRPM | WEIGHT: 293 LBS | BODY MASS INDEX: 44.41 KG/M2 | HEART RATE: 85 BPM | SYSTOLIC BLOOD PRESSURE: 142 MMHG | OXYGEN SATURATION: 96 % | TEMPERATURE: 97.6 F | HEIGHT: 68 IN

## 2022-02-10 DIAGNOSIS — E87.70 VOLUME OVERLOAD: ICD-10-CM

## 2022-02-10 DIAGNOSIS — Z93.6 OTHER ARTIFICIAL OPENINGS OF URINARY TRACT STATUS (HCC): ICD-10-CM

## 2022-02-10 DIAGNOSIS — I20.8 STABLE ANGINA (HCC): ICD-10-CM

## 2022-02-10 DIAGNOSIS — F11.20 CONTINUOUS OPIOID DEPENDENCE (HCC): ICD-10-CM

## 2022-02-10 DIAGNOSIS — L97.515 DIABETIC ULCER OF TOE OF RIGHT FOOT ASSOCIATED WITH TYPE 2 DIABETES MELLITUS, WITH MUSCLE INVOLVEMENT WITHOUT EVIDENCE OF NECROSIS (HCC): ICD-10-CM

## 2022-02-10 DIAGNOSIS — F33.2 SEVERE EPISODE OF RECURRENT MAJOR DEPRESSIVE DISORDER, WITHOUT PSYCHOTIC FEATURES (HCC): ICD-10-CM

## 2022-02-10 DIAGNOSIS — N18.32 STAGE 3B CHRONIC KIDNEY DISEASE (HCC): ICD-10-CM

## 2022-02-10 DIAGNOSIS — I50.43 ACUTE ON CHRONIC COMBINED SYSTOLIC AND DIASTOLIC CONGESTIVE HEART FAILURE (HCC): ICD-10-CM

## 2022-02-10 DIAGNOSIS — N17.9 ACUTE KIDNEY INJURY SUPERIMPOSED ON CHRONIC KIDNEY DISEASE (HCC): ICD-10-CM

## 2022-02-10 DIAGNOSIS — J42 CHRONIC BRONCHITIS, UNSPECIFIED CHRONIC BRONCHITIS TYPE (HCC): ICD-10-CM

## 2022-02-10 DIAGNOSIS — R06.02 SHORTNESS OF BREATH: ICD-10-CM

## 2022-02-10 DIAGNOSIS — E27.8 ADRENAL NODULE (HCC): ICD-10-CM

## 2022-02-10 DIAGNOSIS — Z79.4 TYPE 2 DIABETES MELLITUS WITH DIABETIC POLYNEUROPATHY, WITH LONG-TERM CURRENT USE OF INSULIN (HCC): ICD-10-CM

## 2022-02-10 DIAGNOSIS — N18.9 ACUTE KIDNEY INJURY SUPERIMPOSED ON CHRONIC KIDNEY DISEASE (HCC): ICD-10-CM

## 2022-02-10 DIAGNOSIS — E11.621 DIABETIC ULCER OF TOE OF RIGHT FOOT ASSOCIATED WITH TYPE 2 DIABETES MELLITUS, WITH MUSCLE INVOLVEMENT WITHOUT EVIDENCE OF NECROSIS (HCC): ICD-10-CM

## 2022-02-10 DIAGNOSIS — E66.01 MORBID OBESITY WITH BMI OF 45.0-49.9, ADULT (HCC): ICD-10-CM

## 2022-02-10 DIAGNOSIS — E11.42 TYPE 2 DIABETES MELLITUS WITH DIABETIC POLYNEUROPATHY, WITH LONG-TERM CURRENT USE OF INSULIN (HCC): ICD-10-CM

## 2022-02-10 DIAGNOSIS — I50.9 ACUTE ON CHRONIC CONGESTIVE HEART FAILURE, UNSPECIFIED HEART FAILURE TYPE (HCC): Primary | ICD-10-CM

## 2022-02-10 DIAGNOSIS — I50.9 CHF (CONGESTIVE HEART FAILURE) (HCC): Primary | ICD-10-CM

## 2022-02-10 PROBLEM — E11.22 TYPE 2 DIABETES MELLITUS WITH STAGE 4 CHRONIC KIDNEY DISEASE, WITH LONG-TERM CURRENT USE OF INSULIN (HCC): Status: ACTIVE | Noted: 2020-09-02

## 2022-02-10 PROBLEM — E27.9 ADRENAL NODULE (HCC): Status: ACTIVE | Noted: 2022-02-10

## 2022-02-10 PROBLEM — N18.4 TYPE 2 DIABETES MELLITUS WITH STAGE 4 CHRONIC KIDNEY DISEASE, WITH LONG-TERM CURRENT USE OF INSULIN (HCC): Status: ACTIVE | Noted: 2020-09-02

## 2022-02-10 PROBLEM — I20.89 STABLE ANGINA: Status: ACTIVE | Noted: 2022-02-10

## 2022-02-10 LAB
2HR DELTA HS TROPONIN: 210 NG/L
4HR DELTA HS TROPONIN: 232 NG/L
ALBUMIN SERPL BCP-MCNC: 3.1 G/DL (ref 3.5–5)
ALP SERPL-CCNC: 109 U/L (ref 46–116)
ALT SERPL W P-5'-P-CCNC: 19 U/L (ref 12–78)
ANION GAP SERPL CALCULATED.3IONS-SCNC: 12 MMOL/L (ref 4–13)
AST SERPL W P-5'-P-CCNC: 17 U/L (ref 5–45)
ATRIAL RATE: 68 BPM
ATRIAL RATE: 69 BPM
ATRIAL RATE: 70 BPM
BASOPHILS # BLD AUTO: 0.05 THOUSANDS/ΜL (ref 0–0.1)
BASOPHILS NFR BLD AUTO: 1 % (ref 0–1)
BILIRUB SERPL-MCNC: 0.27 MG/DL (ref 0.2–1)
BUN SERPL-MCNC: 84 MG/DL (ref 5–25)
CALCIUM ALBUM COR SERPL-MCNC: 8.8 MG/DL (ref 8.3–10.1)
CALCIUM SERPL-MCNC: 8.1 MG/DL (ref 8.3–10.1)
CARDIAC TROPONIN I PNL SERPL HS: 186 NG/L
CARDIAC TROPONIN I PNL SERPL HS: 396 NG/L
CARDIAC TROPONIN I PNL SERPL HS: 418 NG/L
CHLORIDE SERPL-SCNC: 107 MMOL/L (ref 100–108)
CO2 SERPL-SCNC: 21 MMOL/L (ref 21–32)
CREAT SERPL-MCNC: 3.13 MG/DL (ref 0.6–1.3)
EOSINOPHIL # BLD AUTO: 0.32 THOUSAND/ΜL (ref 0–0.61)
EOSINOPHIL NFR BLD AUTO: 3 % (ref 0–6)
ERYTHROCYTE [DISTWIDTH] IN BLOOD BY AUTOMATED COUNT: 16.2 % (ref 11.6–15.1)
GFR SERPL CREATININE-BSD FRML MDRD: 15 ML/MIN/1.73SQ M
GLUCOSE SERPL-MCNC: 128 MG/DL (ref 65–140)
GLUCOSE SERPL-MCNC: 145 MG/DL (ref 65–140)
GLUCOSE SERPL-MCNC: 53 MG/DL (ref 65–140)
GLUCOSE SERPL-MCNC: 86 MG/DL (ref 65–140)
HCT VFR BLD AUTO: 28.1 % (ref 34.8–46.1)
HGB BLD-MCNC: 9.1 G/DL (ref 11.5–15.4)
IMM GRANULOCYTES # BLD AUTO: 0.06 THOUSAND/UL (ref 0–0.2)
IMM GRANULOCYTES NFR BLD AUTO: 1 % (ref 0–2)
LYMPHOCYTES # BLD AUTO: 1.5 THOUSANDS/ΜL (ref 0.6–4.47)
LYMPHOCYTES NFR BLD AUTO: 14 % (ref 14–44)
MCH RBC QN AUTO: 32.9 PG (ref 26.8–34.3)
MCHC RBC AUTO-ENTMCNC: 32.4 G/DL (ref 31.4–37.4)
MCV RBC AUTO: 101 FL (ref 82–98)
MONOCYTES # BLD AUTO: 0.82 THOUSAND/ΜL (ref 0.17–1.22)
MONOCYTES NFR BLD AUTO: 7 % (ref 4–12)
NEUTROPHILS # BLD AUTO: 8.31 THOUSANDS/ΜL (ref 1.85–7.62)
NEUTS SEG NFR BLD AUTO: 74 % (ref 43–75)
NRBC BLD AUTO-RTO: 0 /100 WBCS
NT-PROBNP SERPL-MCNC: 2094 PG/ML
P AXIS: 29 DEGREES
P AXIS: 36 DEGREES
P AXIS: 40 DEGREES
PLATELET # BLD AUTO: 219 THOUSANDS/UL (ref 149–390)
PLATELET # BLD AUTO: 227 THOUSANDS/UL (ref 149–390)
PMV BLD AUTO: 9.8 FL (ref 8.9–12.7)
PMV BLD AUTO: 9.8 FL (ref 8.9–12.7)
POTASSIUM SERPL-SCNC: 4.5 MMOL/L (ref 3.5–5.3)
PR INTERVAL: 150 MS
PR INTERVAL: 152 MS
PR INTERVAL: 170 MS
PROT SERPL-MCNC: 6.6 G/DL (ref 6.4–8.2)
QRS AXIS: 167 DEGREES
QRS AXIS: 170 DEGREES
QRS AXIS: 192 DEGREES
QRSD INTERVAL: 148 MS
QRSD INTERVAL: 148 MS
QRSD INTERVAL: 150 MS
QT INTERVAL: 436 MS
QT INTERVAL: 440 MS
QT INTERVAL: 456 MS
QTC INTERVAL: 463 MS
QTC INTERVAL: 475 MS
QTC INTERVAL: 488 MS
RBC # BLD AUTO: 2.77 MILLION/UL (ref 3.81–5.12)
SODIUM SERPL-SCNC: 140 MMOL/L (ref 136–145)
T WAVE AXIS: 57 DEGREES
T WAVE AXIS: 73 DEGREES
T WAVE AXIS: 78 DEGREES
VENTRICULAR RATE: 68 BPM
VENTRICULAR RATE: 69 BPM
VENTRICULAR RATE: 70 BPM
WBC # BLD AUTO: 11.06 THOUSAND/UL (ref 4.31–10.16)

## 2022-02-10 PROCEDURE — 93010 ELECTROCARDIOGRAM REPORT: CPT | Performed by: INTERNAL MEDICINE

## 2022-02-10 PROCEDURE — 99214 OFFICE O/P EST MOD 30 MIN: CPT | Performed by: NURSE PRACTITIONER

## 2022-02-10 PROCEDURE — 99285 EMERGENCY DEPT VISIT HI MDM: CPT

## 2022-02-10 PROCEDURE — 93010 ELECTROCARDIOGRAM REPORT: CPT

## 2022-02-10 PROCEDURE — 82948 REAGENT STRIP/BLOOD GLUCOSE: CPT

## 2022-02-10 PROCEDURE — 85025 COMPLETE CBC W/AUTO DIFF WBC: CPT | Performed by: EMERGENCY MEDICINE

## 2022-02-10 PROCEDURE — 84484 ASSAY OF TROPONIN QUANT: CPT | Performed by: EMERGENCY MEDICINE

## 2022-02-10 PROCEDURE — 36415 COLL VENOUS BLD VENIPUNCTURE: CPT | Performed by: EMERGENCY MEDICINE

## 2022-02-10 PROCEDURE — 83880 ASSAY OF NATRIURETIC PEPTIDE: CPT | Performed by: EMERGENCY MEDICINE

## 2022-02-10 PROCEDURE — 80053 COMPREHEN METABOLIC PANEL: CPT | Performed by: EMERGENCY MEDICINE

## 2022-02-10 PROCEDURE — 3066F NEPHROPATHY DOC TX: CPT

## 2022-02-10 PROCEDURE — 71046 X-RAY EXAM CHEST 2 VIEWS: CPT

## 2022-02-10 PROCEDURE — 99285 EMERGENCY DEPT VISIT HI MDM: CPT | Performed by: EMERGENCY MEDICINE

## 2022-02-10 PROCEDURE — 93005 ELECTROCARDIOGRAM TRACING: CPT

## 2022-02-10 PROCEDURE — 85049 AUTOMATED PLATELET COUNT: CPT | Performed by: INTERNAL MEDICINE

## 2022-02-10 PROCEDURE — 99223 1ST HOSP IP/OBS HIGH 75: CPT | Performed by: INTERNAL MEDICINE

## 2022-02-10 RX ORDER — ASPIRIN 81 MG/1
81 TABLET ORAL DAILY
Status: DISCONTINUED | OUTPATIENT
Start: 2022-02-10 | End: 2022-02-16 | Stop reason: HOSPADM

## 2022-02-10 RX ORDER — ALLOPURINOL 100 MG/1
100 TABLET ORAL DAILY
Status: DISCONTINUED | OUTPATIENT
Start: 2022-02-10 | End: 2022-02-16 | Stop reason: HOSPADM

## 2022-02-10 RX ORDER — OLANZAPINE 5 MG/1
5 TABLET ORAL
Status: DISCONTINUED | OUTPATIENT
Start: 2022-02-10 | End: 2022-02-16 | Stop reason: HOSPADM

## 2022-02-10 RX ORDER — HYDROCODONE BITARTRATE AND ACETAMINOPHEN 5; 325 MG/1; MG/1
1 TABLET ORAL EVERY 8 HOURS PRN
Status: DISCONTINUED | OUTPATIENT
Start: 2022-02-10 | End: 2022-02-16 | Stop reason: HOSPADM

## 2022-02-10 RX ORDER — ATORVASTATIN CALCIUM 40 MG/1
40 TABLET, FILM COATED ORAL
Status: DISCONTINUED | OUTPATIENT
Start: 2022-02-10 | End: 2022-02-16 | Stop reason: HOSPADM

## 2022-02-10 RX ORDER — GABAPENTIN 300 MG/1
300 CAPSULE ORAL 2 TIMES DAILY
Status: DISCONTINUED | OUTPATIENT
Start: 2022-02-10 | End: 2022-02-16 | Stop reason: HOSPADM

## 2022-02-10 RX ORDER — ACETAMINOPHEN 325 MG/1
650 TABLET ORAL EVERY 4 HOURS PRN
Status: DISCONTINUED | OUTPATIENT
Start: 2022-02-10 | End: 2022-02-16 | Stop reason: HOSPADM

## 2022-02-10 RX ORDER — CITALOPRAM 20 MG/1
40 TABLET ORAL DAILY
Status: DISCONTINUED | OUTPATIENT
Start: 2022-02-10 | End: 2022-02-16 | Stop reason: HOSPADM

## 2022-02-10 RX ORDER — TIZANIDINE 4 MG/1
2 TABLET ORAL EVERY 8 HOURS PRN
Status: DISCONTINUED | OUTPATIENT
Start: 2022-02-10 | End: 2022-02-16 | Stop reason: HOSPADM

## 2022-02-10 RX ORDER — HEPARIN SODIUM 5000 [USP'U]/ML
5000 INJECTION, SOLUTION INTRAVENOUS; SUBCUTANEOUS EVERY 8 HOURS SCHEDULED
Status: DISCONTINUED | OUTPATIENT
Start: 2022-02-10 | End: 2022-02-16 | Stop reason: HOSPADM

## 2022-02-10 RX ORDER — PANTOPRAZOLE SODIUM 40 MG/1
40 TABLET, DELAYED RELEASE ORAL 2 TIMES DAILY
Status: DISCONTINUED | OUTPATIENT
Start: 2022-02-10 | End: 2022-02-16 | Stop reason: HOSPADM

## 2022-02-10 RX ORDER — CLOPIDOGREL BISULFATE 75 MG/1
75 TABLET ORAL DAILY
Status: DISCONTINUED | OUTPATIENT
Start: 2022-02-10 | End: 2022-02-16 | Stop reason: HOSPADM

## 2022-02-10 RX ORDER — FUROSEMIDE 10 MG/ML
40 INJECTION INTRAMUSCULAR; INTRAVENOUS 2 TIMES DAILY
Status: DISCONTINUED | OUTPATIENT
Start: 2022-02-10 | End: 2022-02-13

## 2022-02-10 RX ORDER — ISOSORBIDE MONONITRATE 60 MG/1
60 TABLET, EXTENDED RELEASE ORAL DAILY
Status: DISCONTINUED | OUTPATIENT
Start: 2022-02-10 | End: 2022-02-16 | Stop reason: HOSPADM

## 2022-02-10 RX ORDER — LEVOTHYROXINE SODIUM 0.05 MG/1
50 TABLET ORAL DAILY
Status: DISCONTINUED | OUTPATIENT
Start: 2022-02-11 | End: 2022-02-16 | Stop reason: HOSPADM

## 2022-02-10 RX ORDER — INSULIN GLARGINE 100 [IU]/ML
50 INJECTION, SOLUTION SUBCUTANEOUS
Status: DISCONTINUED | OUTPATIENT
Start: 2022-02-10 | End: 2022-02-14

## 2022-02-10 RX ORDER — FUROSEMIDE 10 MG/ML
80 INJECTION INTRAMUSCULAR; INTRAVENOUS ONCE
Status: COMPLETED | OUTPATIENT
Start: 2022-02-10 | End: 2022-02-10

## 2022-02-10 RX ADMIN — FUROSEMIDE 80 MG: 10 INJECTION, SOLUTION INTRAMUSCULAR; INTRAVENOUS at 14:44

## 2022-02-10 RX ADMIN — HYDROCODONE BITARTRATE AND ACETAMINOPHEN 1 TABLET: 5; 325 TABLET ORAL at 22:19

## 2022-02-10 RX ADMIN — PANTOPRAZOLE SODIUM 40 MG: 40 TABLET, DELAYED RELEASE ORAL at 16:18

## 2022-02-10 RX ADMIN — FUROSEMIDE 40 MG: 10 INJECTION, SOLUTION INTRAMUSCULAR; INTRAVENOUS at 17:54

## 2022-02-10 RX ADMIN — CITALOPRAM HYDROBROMIDE 40 MG: 20 TABLET ORAL at 16:19

## 2022-02-10 RX ADMIN — TIZANIDINE 2 MG: 4 TABLET ORAL at 22:19

## 2022-02-10 RX ADMIN — HEPARIN SODIUM 5000 UNITS: 5000 INJECTION INTRAVENOUS; SUBCUTANEOUS at 16:20

## 2022-02-10 RX ADMIN — OLANZAPINE 5 MG: 5 TABLET, FILM COATED ORAL at 22:19

## 2022-02-10 RX ADMIN — ASPIRIN 81 MG: 81 TABLET, COATED ORAL at 16:19

## 2022-02-10 RX ADMIN — INSULIN GLARGINE 50 UNITS: 100 INJECTION, SOLUTION SUBCUTANEOUS at 22:19

## 2022-02-10 RX ADMIN — ATORVASTATIN CALCIUM 40 MG: 40 TABLET, FILM COATED ORAL at 17:54

## 2022-02-10 RX ADMIN — ALLOPURINOL 100 MG: 100 TABLET ORAL at 16:28

## 2022-02-10 RX ADMIN — ISOSORBIDE MONONITRATE 60 MG: 60 TABLET, EXTENDED RELEASE ORAL at 16:19

## 2022-02-10 RX ADMIN — CLOPIDOGREL BISULFATE 75 MG: 75 TABLET ORAL at 16:19

## 2022-02-10 RX ADMIN — GABAPENTIN 300 MG: 300 CAPSULE ORAL at 17:54
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
## 2022-02-10 NOTE — ASSESSMENT & PLAN NOTE
History of multiple stents to the LAD and recent stenting to the RCA and August 2021  Continue dual anti-platelet with aspirin and Plavix  Continue Lipitor  Continue Imdur  Hold Coreg due to bradycardia and compensated CHF

## 2022-02-10 NOTE — ASSESSMENT & PLAN NOTE
· 20 lb weight gain, dyspnea, vascular congestion on imaging and peripheral edema secondary to both congestive heart failure and renal failure    · See plan for CHF/acute kidney injury below

## 2022-02-10 NOTE — ASSESSMENT & PLAN NOTE
Acute renal failure secondary to cardiorenal syndrome  Check bladder scan rule out retention  Monitor renal function closely while she is on IV diuretics    Consult nephrology if her creatinine starts to rise  Decrease maintenance allopurinol to 100 mg daily due to current renal function

## 2022-02-10 NOTE — ASSESSMENT & PLAN NOTE
Lab Results   Component Value Date    HGBA1C 8 5 (A) 12/16/2021     Placed on Lantus 50 units at night with Humalog 15 units with meals and sliding scale  Watch for hypoglycemia given elevated creatinine and decreased clearance of Lantus  Adjust Neurontin dose to 600 b i d  according to current renal function

## 2022-02-10 NOTE — ED NOTES
Pt  After glucose check was provided meal tray and juice  Pt  Is alert and oriented x4       Carlos Coulter RN  02/10/22 5686

## 2022-02-10 NOTE — ED PROVIDER NOTES
History  Chief Complaint   Patient presents with    Shortness of Breath     pt with hx of CHF  comes from office after 20lb weight gain  pt with catheter and also noticed decreased urine output  62 yo F with CHF, IDDM, HTN, h/o CAD with stent, referred to ED from PCP office for increasing shortness of breath, with PADILLA, increasing BLE edema and weight gain, about 20# over 2-3 weeks  She takes diuretics but affirms the response has not been as vigorous  Wt Readings from Last 3 Encounters:  02/10/22 : (!) 137 kg (302 lb)  12/16/21 : 125 kg (276 lb 4 8 oz)  12/15/21 : 123 kg (272 lb)      History provided by:  Patient      Prior to Admission Medications   Prescriptions Last Dose Informant Patient Reported? Taking?    Continuous Blood Gluc  (BioStable 2 Bee) YOUNG  Self No No   Sig: Use as directed   Continuous Blood Gluc Sensor (FreeStyle Iraida 14 Day Sensor) MISC  Self No No   Sig: USE 1 SENSOR EVERY 2 WEEKS   Diclofenac Sodium (VOLTAREN) 1 %  Self No No   Sig: Apply 2 g topically 4 (four) times a day   Patient taking differently: Apply 2 g topically as needed    HYDROcodone-acetaminophen (NORCO) 5-325 mg per tablet   No No   Sig: Take 1 tablet by mouth 3 (three) times a day as needed for pain For ongoing pain Max Daily Amount: 3 tablets   Insulin Pen Needle (BD Pen Needle Micro U/F) 32G X 6 MM MISC   No No   Sig: Use 3 (three) times a day   Insulin Syringe-Needle U-100 (BD Veo Insulin Syringe U/F) 31G X 15/64" 0 5 ML MISC  Self No No   Sig: Inject under the skin 3 (three) times a day   Lancets (OneTouch Delica Plus [id number]) MISC  Self No No   Sig: USE TO TEST 3 TIMES DAILY   OLANZapine (ZyPREXA) 5 mg tablet 2/9/2022 at Unknown time Self No Yes   Sig: TAKE 1 TABLET BY MOUTH EVERYDAY AT BEDTIME   Trulicity 1 5 XG/5 0KA SOPN 2/9/2022 at Unknown time  No Yes   Sig: INJECT 0 5 ML (1 5 MG TOTAL) UNDER THE SKIN ONCE A WEEK   allopurinol (ZYLOPRIM) 300 mg tablet   No No   Sig: TAKE 1 TABLET BY MOUTH EVERY DAY   amLODIPine (NORVASC) 5 mg tablet 2/9/2022 at Unknown time  No Yes   Sig: TAKE 1 TABLET BY MOUTH EVERY DAY   aspirin (ECOTRIN LOW STRENGTH) 81 mg EC tablet 2/9/2022 at Unknown time  No Yes   Sig: TAKE 1 TABLET (81 MG TOTAL) BY MOUTH ONCE DAILY   atorvastatin (LIPITOR) 40 mg tablet 2/9/2022 at Unknown time  No Yes   Sig: TAKE 1 TABLET BY MOUTH EVERY DAY   bisacodyl (DULCOLAX) 5 mg EC tablet   No Yes   Sig: Take as instructed on bowel preparation instructions   carvedilol (COREG) 25 mg tablet 2/9/2022 at Unknown time  No Yes   Sig: TAKE 1 TABLET BY MOUTH TWICE A DAY WITH FOOD   citalopram (CeleXA) 40 mg tablet 2/9/2022 at Unknown time Self No Yes   Sig: Take 1 tablet (40 mg total) by mouth daily   clopidogrel (PLAVIX) 75 mg tablet 2/9/2022 at Unknown time  No Yes   Sig: TAKE 1 TABLET BY MOUTH EVERY DAY   collagenase (SANTYL) ointment  Self No No   Sig: Apply topically daily   diphenhydrAMINE (BENADRYL) 25 mg capsule  Self Yes Yes   Sig: Take 25 mg by mouth every 4 (four) hours as needed for allergies or sleep    docusate sodium (COLACE) 100 mg capsule 2/9/2022 at Unknown time Self No Yes   Sig: Take 1 capsule (100 mg total) by mouth 2 (two) times a day   ferrous sulfate 325 (65 Fe) mg tablet 2/9/2022 at Unknown time  No Yes   Sig: Take 1 tablet (325 mg total) by mouth every other day   gabapentin (NEURONTIN) 600 MG tablet 2/10/2022 at Unknown time  No Yes   Sig: TAKE 1 TABLET (600 MG TOTAL) BY MOUTH 4 (FOUR) TIMES A DAY   glucose blood (OneTouch Ultra) test strip   No No   Sig: Use 1 each daily Use as instructed   insulin glargine (Lantus SoloStar) 100 units/mL injection pen   No No   Sig: Inject 50 Units under the skin every 12 (twelve) hours   insulin lispro (HumaLOG KwikPen) 100 units/mL injection pen   No No   Sig: Inject 15 Units under the skin 3 (three) times a day with meals (for large meals - high carb, use 18 units)   Patient taking differently: Inject 20 Units under the skin 3 (three) times a day with meals (for large meals - high carb, use 18 units)    isosorbide mononitrate (IMDUR) 30 mg 24 hr tablet Not Taking at Unknown time  No No   Sig: TAKE 1 TABLET BY MOUTH EVERY DAY   Patient not taking: Reported on 2/10/2022   isosorbide mononitrate (IMDUR) 60 mg 24 hr tablet 2/10/2022 at Unknown time Self No Yes   Sig: Take 1 tablet (60 mg total) by mouth daily   levothyroxine 50 mcg tablet 2/10/2022 at Unknown time  No Yes   Sig: TAKE 1 TABLET BY MOUTH EVERY DAY   naloxone (NARCAN) 4 mg/0 1 mL nasal spray  Self No No   Sig: Administer 1 spray into a nostril  If breathing does not return to normal or if breathing difficulty resumes after 2-3 minutes, give another dose in the other nostril using a new spray  nitroglycerin (NITROSTAT) 0 4 mg SL tablet  Self No Yes   Sig: Place 1 tablet (0 4 mg total) under the tongue every 5 (five) minutes as needed for chest pain   nystatin (MYCOSTATIN) powder  Self No No   Sig: Apply topically 2 (two) times a day   ondansetron (ZOFRAN) 4 mg tablet 2/9/2022 at Unknown time  No Yes   Sig: Take 1 tablet (4 mg total) by mouth every 8 (eight) hours as needed for nausea or vomiting   pantoprazole (PROTONIX) 40 mg tablet 2/10/2022 at Unknown time  No Yes   Sig: Take 1 tablet (40 mg total) by mouth 2 (two) times a day   polyethylene glycol (GLYCOLAX) 17 GM/SCOOP powder   No No   Sig: Take 17 g by mouth daily   polyethylene glycol (GOLYTELY) 4000 mL solution Not Taking at Unknown time  No No   Sig: Take as instructed on bowel preparation instructions   Patient not taking: Reported on 2/10/2022    silver sulfadiazine (SILVADENE,SSD) 1 % cream  Self No No   Sig: Apply topically daily To burns on fingers, cover w/band-aid  Patient taking differently: Apply topically as needed To burns on fingers, cover w/band-aid     sucralfate (CARAFATE) 1 g/10 mL suspension   No No   Sig: Take 10 mL (1 g total) by mouth 4 (four) times a day for 7 days   Patient not taking: Reported on 12/15/2021 tiZANidine (ZANAFLEX) 2 mg tablet 2/9/2022 at Unknown time  No Yes   Sig: Take 1 tablet (2 mg total) by mouth every 8 (eight) hours as needed for muscle spasms   torsemide (DEMADEX) 20 mg tablet 2/10/2022 at Unknown time  No Yes   Sig: Take 1 tablet (20 mg total) by mouth daily      Facility-Administered Medications: None       Past Medical History:   Diagnosis Date    Abnormal liver function     Anemia     Anxiety     Arthritis     Chronic kidney disease     stage 3    Chronic narcotic dependence (HCC)     Chronic pain disorder     lower back, hands , neck and knees    Coronary artery disease     Depression     Diabetes mellitus ([facility] [address] )     GERD (gastroesophageal reflux disease)     no meds at present    Heart murmur     murmur    Hyperlipidemia     Hypertension     Neurogenic bladder     Open toe wound 12/2020    right big toe open calus but is dry at present    Renal disorder     Shortness of breath     exertion    Sleep apnea     doesn't use cpap    Suprapubic catheter [facility])        Past Surgical History:   Procedure Laterality Date    AMPUTATION      ANGIOPLASTY  2017    5    BREAST EXCISIONAL BIOPSY Left     BREAST SURGERY      CARDIAC CATHETERIZATION      CARPAL TUNNEL RELEASE Bilateral     CERVICAL FUSION      HYSTERECTOMY  2008    IR SUPRAPUBIC CATHETER PLACEMENT  6/15/2021    KNEE SURGERY      OOPHORECTOMY  2008    NC EXC SKIN BENIG 3 1-4 CM REMAINDR BODY N/A 12/21/2020    Procedure: EXCISION SEBACEOUS CYST X 5 SCALP;  Surgeon: Stevie Veliz MD;  Location:  MAIN OR;  Service: General    TOE AMPUTATION Left     TRIGGER FINGER RELEASE Right     4th Finger       Family History   Problem Relation Age of Onset    Stroke Father     Heart disease Father     No Known Problems Mother     No Known Problems Sister     No Known Problems Daughter     No Known Problems Maternal Grandmother     No Known Problems Maternal Grandfather     No Known Problems Paternal Grandmother  No Known Problems Paternal Grandfather     No Known Problems Maternal Aunt     No Known Problems Maternal Aunt     No Known Problems Maternal Aunt     No Known Problems Maternal Aunt     No Known Problems Maternal Aunt     No Known Problems Maternal Aunt     No Known Problems Paternal Aunt      I have reviewed and agree with the history as documented  E-Cigarette/Vaping    E-Cigarette Use Never User      E-Cigarette/Vaping Substances    Nicotine No     THC No     CBD No     Flavoring No     Other No     Unknown No      Social History     Tobacco Use    Smoking status: Former Smoker     Packs/day: 1 00     Years: 35 00     Pack years: 35 00     Types: Cigarettes     Quit date: 2012     Years since quitting: 10 1    Smokeless tobacco: Never Used   Vaping Use    Vaping Use: Never used   Substance Use Topics    Alcohol use: Not Currently    Drug use: Never       Review of Systems   Constitutional: Negative for appetite change, chills and fever  HENT: Negative for sore throat  Respiratory: Negative for cough, shortness of breath and wheezing  Cardiovascular: Positive for chest pain and leg swelling  Negative for palpitations  Gastrointestinal: Negative for abdominal pain, diarrhea, nausea and vomiting  Genitourinary: Negative for dysuria and hematuria  Musculoskeletal: Negative for neck pain  Skin: Negative for rash  Neurological: Negative for dizziness, weakness and headaches  Psychiatric/Behavioral: Negative for suicidal ideas  All other systems reviewed and are negative  Physical Exam  Physical Exam  Vitals and nursing note reviewed  Constitutional:       Appearance: She is well-developed  She is obese  She is not toxic-appearing or diaphoretic  HENT:      Head: Normocephalic and atraumatic        Right Ear: Tympanic membrane and external ear normal       Left Ear: Tympanic membrane and external ear normal       Nose: Nose normal    Eyes:      Conjunctiva/sclera: Conjunctivae normal       Pupils: Pupils are equal, round, and reactive to light  Neck:      Meningeal: Brudzinski's sign and Kernig's sign absent  Cardiovascular:      Rate and Rhythm: Normal rate and regular rhythm  Pulses: Normal pulses  Heart sounds: Normal heart sounds  No murmur heard  Pulmonary:      Effort: Pulmonary effort is normal  No tachypnea or respiratory distress  Breath sounds: Normal breath sounds  No wheezing  Abdominal:      General: Bowel sounds are normal  There is no distension  Palpations: Abdomen is soft  Abdomen is not rigid  Tenderness: There is no abdominal tenderness  There is no guarding or rebound  Musculoskeletal:         General: Normal range of motion  Cervical back: Full passive range of motion without pain, normal range of motion and neck supple  Right lower leg: No swelling  3+ Pitting Edema present  Left lower leg: No swelling  3+ Pitting Edema present  Lymphadenopathy:      Cervical: No cervical adenopathy  Skin:     General: Skin is warm and dry  Coloration: Skin is not pale  Findings: No rash  Neurological:      Mental Status: She is alert and oriented to person, place, and time  GCS: GCS eye subscore is 4  GCS verbal subscore is 5  GCS motor subscore is 6  Cranial Nerves: No cranial nerve deficit  Sensory: No sensory deficit  Coordination: Coordination normal       Gait: Gait normal       Deep Tendon Reflexes: Reflexes are normal and symmetric  Psychiatric:         Speech: Speech normal          Behavior: Behavior normal          Thought Content:  Thought content normal          Judgment: Judgment normal          Vital Signs  ED Triage Vitals   Temperature Pulse Respirations Blood Pressure SpO2   02/10/22 1255 02/10/22 1255 02/10/22 1255 02/10/22 1255 02/10/22 1255   98 3 °F (36 8 °C) 56 16 130/60 96 %      Temp Source Heart Rate Source Patient Position - Orthostatic VS BP Location FiO2 (%)   02/10/22 1255 02/10/22 1539 -- -- --   Oral Monitor         Pain Score       02/10/22 1539       7           Vitals:    02/10/22 1255 02/10/22 1539   BP: 130/60 155/68   Pulse: 56 69         Visual Acuity      ED Medications  Medications   allopurinol (ZYLOPRIM) tablet 100 mg (100 mg Oral Given 2/10/22 1628)   aspirin (ECOTRIN LOW STRENGTH) EC tablet 81 mg (81 mg Oral Given 2/10/22 1619)   atorvastatin (LIPITOR) tablet 40 mg (has no administration in time range)   citalopram (CeleXA) tablet 40 mg (40 mg Oral Given 2/10/22 1619)   clopidogrel (PLAVIX) tablet 75 mg (75 mg Oral Given 2/10/22 1619)   gabapentin (NEURONTIN) capsule 300 mg (has no administration in time range)   insulin glargine (LANTUS) subcutaneous injection 50 Units 0 5 mL (has no administration in time range)   insulin lispro (HumaLOG) 100 units/mL subcutaneous injection 15 Units (0 Units Subcutaneous Hold 2/10/22 1628)   isosorbide mononitrate (IMDUR) 24 hr tablet 60 mg (60 mg Oral Given 2/10/22 1619)   levothyroxine tablet 50 mcg (has no administration in time range)   OLANZapine (ZyPREXA) tablet 5 mg (has no administration in time range)   pantoprazole (PROTONIX) EC tablet 40 mg (40 mg Oral Given 2/10/22 1618)   tiZANidine (ZANAFLEX) tablet 2 mg (has no administration in time range)   furosemide (LASIX) injection 40 mg (has no administration in time range)   heparin (porcine) subcutaneous injection 5,000 Units (5,000 Units Subcutaneous Given 2/10/22 1620)   insulin lispro (HumaLOG) 100 units/mL subcutaneous injection 2-12 Units (0 Units Subcutaneous Hold 2/10/22 1628)   furosemide (LASIX) injection 80 mg (80 mg Intravenous Given 2/10/22 1444)       Diagnostic Studies  Results Reviewed     Procedure Component Value Units Date/Time    Fingerstick Glucose (POCT) [[id number]]  (Abnormal) Collected: 02/10/22 1625    Lab Status: Final result Updated: 02/10/22 1628     POC Glucose 53 mg/dl     HS Troponin I 2hr [[id number]]  (Abnormal) Collected: 02/10/22 1538    Lab Status: Final result Specimen: Blood from Arm, Right Updated: 02/10/22 1613     hs TnI 2hr 396 ng/L      Delta 2hr hsTnI 210 ng/L     Platelet count [[id number]]     Lab Status: No result Specimen: Blood     HS Troponin I 4hr [[id number]]     Lab Status: No result Specimen: Blood     HS Troponin 0hr (reflex protocol) [[id number]]  (Abnormal) Collected: 02/10/22 1324    Lab Status: Final result Specimen: Blood from Arm, Right Updated: 02/10/22 1407     hs TnI 0hr 186 ng/L     NT-BNP PRO [[id number]]  (Abnormal) Collected: 02/10/22 1324    Lab Status: Final result Specimen: Blood from Arm, Right Updated: 02/10/22 1405     NT-proBNP 2,094 pg/mL     Comprehensive metabolic panel [[id number]]  (Abnormal) Collected: 02/10/22 1324    Lab Status: Final result Specimen: Blood from Arm, Right Updated: 02/10/22 1358     Sodium 140 mmol/L      Potassium 4 5 mmol/L      Chloride 107 mmol/L      CO2 21 mmol/L      ANION GAP 12 mmol/L      BUN 84 mg/dL      Creatinine 3 13 mg/dL      Glucose 145 mg/dL      Calcium 8 1 mg/dL      Corrected Calcium 8 8 mg/dL      AST 17 U/L      ALT 19 U/L      Alkaline Phosphatase 109 U/L      Total Protein 6 6 g/dL      Albumin 3 1 g/dL      Total Bilirubin 0 27 mg/dL      eGFR 15 ml/min/1 73sq m     Narrative:      [facility] Chronic [facility] Disease (CKD):     Stage 1 with normal or high GFR (GFR > 90 mL/min/1 73 square meters)    Stage 2 Mild CKD (GFR = 60-89 mL/min/1 73 square meters)    Stage 3A Moderate CKD (GFR = 45-59 mL/min/1 73 square meters)    Stage 3B Moderate CKD (GFR = 30-44 mL/min/1 73 square meters)    Stage 4 Severe CKD (GFR = 15-29 mL/min/1 73 square meters)    Stage 5 End Stage CKD (GFR <15 mL/min/1 73 square meters)  Note: GFR calculation is accurate only with a steady state creatinine    CBC and differential [[id number]]  (Abnormal) Collected: 02/10/22 1324    Lab Status: Final result Specimen: Blood from Arm, Right Updated: 02/10/22 1329     WBC 11 06 Thousand/uL      RBC 2 77 Million/uL      Hemoglobin 9 1 g/dL      Hematocrit 28 1 %       fL      MCH 32 9 pg      MCHC 32 4 g/dL      RDW 16 2 %      MPV 9 8 fL      Platelets 759 Thousands/uL      nRBC 0 /100 WBCs      Neutrophils Relative 74 %      Immat GRANS % 1 %      Lymphocytes Relative 14 %      Monocytes Relative 7 %      Eosinophils Relative 3 %      Basophils Relative 1 %      Neutrophils Absolute 8 31 Thousands/µL      Immature Grans Absolute 0 06 Thousand/uL      Lymphocytes Absolute 1 50 Thousands/µL      Monocytes Absolute 0 82 Thousand/µL      Eosinophils Absolute 0 32 Thousand/µL      Basophils Absolute 0 05 Thousands/µL                  XR chest 2 views   Final Result by Bronson Casanova MD (02/10 1515)      Possible trace left pleural effusion  No consolidation or edema  Workstation performed: [id number]                    Procedures  ECG 12 Lead Documentation Only    Date/Time: 2/10/2022 12:51 PM  Performed by: Andrés Bearden MD  Authorized by: Andrés Bearden MD     Previous ECG:     Previous ECG:  Compared to current    Comparison ECG info: Oct 2021    Similarity:  No change  Rate:     ECG rate:  70  Rhythm:     Rhythm: sinus rhythm    Ectopy:     Ectopy: none    Conduction:     Conduction: abnormal      Abnormal conduction: complete LBBB               ED Course  ED Course as of 02/10/22 1715   Thu Feb 10, 2022   1330 Hemoglobin(!): 9 1  Stable from recent baseline 9/2021   1404 Creatinine(!): 3 13   1407 hs TnI 0hr(!): 186   1407 NT-proBNP(!): 2,094   1422 Reviewed results with patient at bedside and updated on the plan  Labwork indicates worsening renal function, exacerbating CHF, and she needs to be admitted                                                 MDM    Disposition  Final diagnoses:   CHF (congestive heart failure) (HCC)   Volume overload   Acute kidney injury superimposed on chronic kidney disease ([facility] [address] ) Time reflects when diagnosis was documented in both MDM as applicable and the Disposition within this note     Time User Action Codes Description Comment    2/10/2022  2:23 PM Donnamae Sonia Add [I50 9] CHF (congestive heart failure) ([facility] [address] )     2/10/2022  2:23 PM Donnamae Sonia Add [E87 70] Volume overload     2/10/2022  2:23 PM Donnamae Sonia Add [N17 9,  N18 9] Acute kidney injury superimposed on chronic kidney disease [facility])       ED Disposition     ED Disposition Condition Date/Time Comment    Admit Stable u Feb 10, 2022  2:43 PM Case was discussed with Dr Jeanne Hogan and the patient's admission status was agreed to be Admission Status: inpatient status to the service of Dr Jeanen Hogan   Follow-up Information    None         Patient's Medications   Discharge Prescriptions    No medications on file       No discharge procedures on file      PDMP Review       Value Time User    PDMP Reviewed  Yes 12/16/2021  4:40 PM Isabelle Ortega          ED Provider  Electronically Signed by           Rena Kocher, MD  02/10/22 7460

## 2022-02-10 NOTE — H&P
[address] [facility]  [facility] 1960, 63 [facility] o  female MRN: [id number]  Unit/Bed#: ED 30 Encounter: [id number]  Primary Care Provider: CHERELLE Elkins   Date and time admitted to hospital: 2/10/2022 12:46 PM    * Volume overload  Assessment & Plan  · 20 lb weight gain, dyspnea, vascular congestion on imaging and peripheral edema secondary to both congestive heart failure and renal failure  · See plan for CHF/acute kidney injury below    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 heart failure [facility])  Assessment & Plan  Wt Readings from Last 3 Encounters:   02/10/22 (!) 137 kg (302 lb)   12/16/21 125 kg (276 lb 4 8 oz)   12/15/21 123 kg (272 lb)     Acute on chronic combined congestive heart failure with last known EF of 45% and grade 2 diastolic dysfunction  Maintained on low-dose torsemide 20 mg daily prior to admission  Due to CKD 4 in low GFR, she likely needs higher dose of maintenance diuretics on discharge  For now Start Lasix 40 mg IV b i d  Hold Coreg secondary to bradycardia on telemetry and decompensated CHF  Acute renal failure superimposed on stage 4 chronic kidney disease ([facility] [address] )  Assessment & Plan  Acute renal failure secondary to cardiorenal syndrome  Check bladder scan rule out retention  Monitor renal function closely while she is on IV diuretics    Consult nephrology if her creatinine starts to rise  Decrease maintenance allopurinol to 100 mg daily due to current renal function    Type 2 diabetes mellitus with stage 4 chronic kidney disease, with long-term current use of insulin [facility])  Assessment & Plan  Lab Results   Component Value Date    HGBA1C 8 5 (A) 12/16/2021     Placed on Lantus 50 units at night with Humalog 15 units with meals and sliding scale  Watch for hypoglycemia given elevated creatinine and decreased clearance of Lantus  Adjust Neurontin dose to 600 b i d  according to current renal function    CAD (coronary artery disease)  Assessment & Plan  History of multiple stents to the LAD and recent stenting to the RCA and August 2021  Continue dual anti-platelet with aspirin and Plavix  Continue Lipitor  Continue Imdur  Hold Coreg due to bradycardia and compensated CHF  VTE Prophylaxis: Heparin  / sequential compression device   Code Status:  Full code  POLST: There is no POLST form on file for this patient (pre-hospital)    Anticipated Length of Stay:  Patient will be admitted on an Inpatient basis with an anticipated length of stay of  at least 2 midnights  Justification for Hospital Stay:  CHF and acute kidney injury    Total Time for Visit, including Counseling / Coordination of Care: 45 minutes  Greater than 50% of this total time spent on direct patient counseling and coordination of care  Chief Complaint:   Weight gain, swelling and shortness of breath    History of Present Illness:    Kelsie Eugene is a 63 y o  female who with known history of chronic combined congestive heart failure and chronic kidney disease stage 4 who presented to the hospital due to shortness of breath, 20 lb weight gain weight gain and swelling for the past 2 weeks  She developed shortness of breath on minimal exertion such as walking around her house or going up steps  The shortness of breath improves with rest   She also noticed worsening swelling of both legs up to her thighs with mild reddish discoloration  She noticed that the torsemide she was taking was not effective and she was not urinating as much  At night she had to use 2 pillows to sleep due to difficulty laying flat  She denied fever, chills, cough or sputum production  She admits compliance with her medications  She denies eating salty foods  However she does drink diet soda frequently   She saw her PCP today and was directed to the ER due to her symptoms and volume overload on physical exam     Review of Systems:    Review of Systems   Constitutional: Positive for activity change, fatigue and unexpected weight change  Negative for chills and fever  HENT: Negative  Eyes: Negative  Respiratory: Positive for shortness of breath  Cardiovascular: Positive for leg swelling  Negative for palpitations  Gastrointestinal: Negative  Genitourinary: Positive for decreased urine volume  Musculoskeletal: Negative  Skin: Negative  Neurological: Negative  Psychiatric/Behavioral: Negative  All other systems reviewed and are negative  Past Medical and Surgical History:     Past Medical History:   Diagnosis Date    Abnormal liver function     Anemia     Anxiety     Arthritis     Chronic kidney disease     stage 3    Chronic narcotic dependence (HCC)     Chronic pain disorder     lower back, hands , neck and knees    Coronary artery disease     Depression     Diabetes mellitus ([address] )     GERD (gastroesophageal reflux disease)     no meds at present    Heart murmur     murmur    Hyperlipidemia     Hypertension     Neurogenic bladder     Open toe wound 12/2020    right big toe open calus but is dry at present    Renal disorder     Shortness of breath     exertion    Sleep apnea     doesn't use cpap    Suprapubic catheter [facility])        Past Surgical History:   Procedure Laterality Date    AMPUTATION      ANGIOPLASTY  2017    5    BREAST EXCISIONAL BIOPSY Left     BREAST SURGERY      CARDIAC CATHETERIZATION      CARPAL TUNNEL RELEASE Bilateral     CERVICAL FUSION      HYSTERECTOMY  2008    IR SUPRAPUBIC CATHETER PLACEMENT  6/15/2021    KNEE SURGERY      OOPHORECTOMY  2008    SD EXC SKIN BENIG 3 1-4 CM REMAINDR BODY N/A 12/21/2020    Procedure: EXCISION SEBACEOUS CYST X 5 SCALP;  Surgeon: Cookie Conklin MD;  Location:  MAIN OR;  Service: General    TOE AMPUTATION Left     TRIGGER FINGER RELEASE Right     4th Finger       Meds/Allergies:    Prior to Admission medications    Medication Sig Start Date End Date Taking?  Authorizing Provider   amLODIPine (NORVASC) 5 mg tablet TAKE 1 TABLET BY MOUTH EVERY DAY 1/31/22  CHERELLE Tristan   aspirin (ECOTRIN LOW STRENGTH) 81 mg EC tablet TAKE 1 TABLET (81 MG TOTAL) BY MOUTH ONCE DAILY 1/3/22  Yes CHERELLE Jacobsen   atorvastatin (LIPITOR) 40 mg tablet TAKE 1 TABLET BY MOUTH EVERY DAY 12/6/21  CHERELLE Tristan   bisacodyl (DULCOLAX) 5 mg EC tablet Take as instructed on bowel preparation instructions 1/11/22  Yes John Paul Mulligan MD   carvedilol (COREG) 25 mg tablet TAKE 1 TABLET BY MOUTH TWICE A DAY WITH FOOD 1/11/22  Yes Jace Elena DO   citalopram (CeleXA) 40 mg tablet Take 1 tablet (40 mg total) by mouth daily 6/14/21  CHERELLE Tristan   clopidogrel (PLAVIX) 75 mg tablet TAKE 1 TABLET BY MOUTH EVERY DAY 1/3/22  CHERELLE Tristan   diphenhydrAMINE (BENADRYL) 25 mg capsule Take 25 mg by mouth every 4 (four) hours as needed for allergies or sleep    Yes Historical Provider, MD   docusate sodium (COLACE) 100 mg capsule Take 1 capsule (100 mg total) by mouth 2 (two) times a day 7/13/21  CHERELLE Tristan   ferrous sulfate 325 (65 Fe) mg tablet Take 1 tablet (325 mg total) by mouth every other day 12/14/21  CHERELLE Tristan   gabapentin (NEURONTIN) 600 MG tablet TAKE 1 TABLET (600 MG TOTAL) BY MOUTH 4 (FOUR) TIMES A DAY 10/25/21  CHERELLE Tristan   isosorbide mononitrate (IMDUR) 60 mg 24 hr tablet Take 1 tablet (60 mg total) by mouth daily 9/15/21  Yes Jace Elena DO   levothyroxine 50 mcg tablet TAKE 1 TABLET BY MOUTH EVERY DAY 2/4/22  CHERELLE Tristan   nitroglycerin (NITROSTAT) 0 4 mg SL tablet Place 1 tablet (0 4 mg total) under the tongue every 5 (five) minutes as needed for chest pain 4/26/21  Yes Jace Elena DO   OLANZapine (ZyPREXA) 5 mg tablet TAKE 1 TABLET BY MOUTH EVERYDAY AT BEDTIME 9/28/21  JOSE TristanNP   ondansetron (ZOFRAN) 4 mg tablet Take 1 tablet (4 mg total) by mouth every 8 (eight) hours as needed for nausea or vomiting 12/16/21  Yes CHERELLE Armstrong   pantoprazole (PROTONIX) 40 mg tablet Take 1 tablet (40 mg total) by mouth 2 (two) times a day 1/11/22  Yes Nelli Barger MD   tiZANidine (ZANAFLEX) 2 mg tablet Take 1 tablet (2 mg total) by mouth every 8 (eight) hours as needed for muscle spasms 12/16/21  Yes CHERELLE Armstrong   torsemide (DEMADEX) 20 mg tablet Take 1 tablet (20 mg total) by mouth daily 1/6/22 2/10/22 Yes CHERELLE Armstrong   Trulicity 1 5 OR/1 6PW SOPN INJECT 0 5 ML (1 5 MG TOTAL) UNDER THE SKIN ONCE A WEEK 12/13/21  Yes CHERELLE Armstrong   allopurinol (ZYLOPRIM) 300 mg tablet TAKE 1 TABLET BY MOUTH EVERY DAY 11/29/21   CHERELLE Armstrong   collagenase (SANTYL) ointment Apply topically daily 9/10/21   Tim Valverde PA-C   Continuous Blood Gluc  (Bar Pass 2 Lipan) YOUNG Use as directed 4/1/21   Nelida Leavitt MD   Continuous Blood Gluc Sensor (FreeStyle [place] 14 Day Sensor) MISC USE 1 SENSOR EVERY 2 WEEKS 4/1/21   Nelida Leavitt MD   Diclofenac Sodium (VOLTAREN) 1 % Apply 2 g topically 4 (four) times a day  Patient taking differently: Apply 2 g topically as needed  5/21/21   CHERELLE Armstrong   glucose blood (OneTouch Ultra) test strip Use 1 each daily Use as instructed 12/16/21   CHERELLE Armstrong   HYDROcodone-acetaminophen ([facility]) 5-325 mg per tablet Take 1 tablet by mouth 3 (three) times a day as needed for pain For ongoing pain Max Daily Amount: 3 tablets 12/1/21   CHERELLE Armstrong   insulin glargine (Lantus SoloStar) 100 units/mL injection pen Inject 50 Units under the skin every 12 (twelve) hours 11/30/21   CHERELLE Armstrong   insulin lispro (HumaLOG KwikPen) 100 units/mL injection pen Inject 15 Units under the skin 3 (three) times a day with meals (for large meals - high carb, use 18 units)  Patient taking differently: Inject 20 Units under the skin 3 (three) times a day with meals (for large meals - high carb, use 18 units)  12/14/21   CHERELLE Marlow   Insulin Pen Needle (BD Pen Needle Micro U/F) 32G X 6 MM MISC Use 3 (three) times a day 12/14/21   CHERELLE Marlow   Insulin Syringe-Needle U-100 (BD Veo Insulin Syringe U/F) 31G X 15/64" 0 5 ML MISC Inject under the skin 3 (three) times a day 3/10/21   CHERELLE Osorio   isosorbide mononitrate (IMDUR) 30 mg 24 hr tablet TAKE 1 TABLET BY MOUTH EVERY DAY  Patient not taking: Reported on 2/10/2022 12/3/21   Sheri Martin,    Lancets (OneTouch Delica Plus [id number]) MISC USE TO TEST 3 TIMES DAILY 3/10/21   CHERELLE Osorio   naloxone [facility]) 4 mg/0 1 mL nasal spray Administer 1 spray into a nostril  If breathing does not return to normal or if breathing difficulty resumes after 2-3 minutes, give another dose in the other nostril using a new spray  3/10/21   CHERELLE Osorio   nystatin (MYCOSTATIN) powder Apply topically 2 (two) times a day 9/10/21   Tim Valverde PA-C   polyethylene glycol (GLYCOLAX) 17 GM/SCOOP powder Take 17 g by mouth daily 1/11/22   Lea Linda MD   polyethylene glycol (GOLYTELY) 4000 mL solution Take as instructed on bowel preparation instructions  Patient not taking: Reported on 2/10/2022  1/11/22   Lea Linda MD   silver sulfadiazine (SILVADENE,SSD) 1 % cream Apply topically daily To burns on fingers, cover w/band-aid  Patient taking differently: Apply topically as needed To burns on fingers, cover w/band-aid  3/10/21   CHERELLE Osorio   sucralfate (CARAFATE) 1 g/10 mL suspension Take 10 mL (1 g total) by mouth 4 (four) times a day for 7 days  Patient not taking: Reported on 12/15/2021  10/13/21 12/15/21  CHERELLE Marlow     I have reviewed home medications with a medical source (PCP, Pharmacy, other)  Allergies:    Allergies   Allergen Reactions    Codeine      Other reaction(s): Nausea and Vomiting    Latex Itching       Social History:     Marital Status:    Patient Pre-hospital Living Situation:  Lives with family  Patient Pre-hospital Level of Mobility:  Independent  Patient Pre-hospital Diet Restrictions:  Diabetic  Substance Use History:   Social History     Substance and Sexual Activity   Alcohol Use Not Currently     Social History     Tobacco Use   Smoking Status Former Smoker    Packs/day: 1 00    Years: 35 00    Pack years: 35 00    Types: Cigarettes    Quit date: 2012    Years since quitting: 10 1   Smokeless Tobacco Never Used     Social History     Substance and Sexual Activity   Drug Use Never       Family History:    Family History   Problem Relation Age of Onset    Stroke Father     Heart disease Father     No Known Problems Mother     No Known Problems Sister     No Known Problems Daughter     No Known Problems Maternal Grandmother     No Known Problems Maternal Grandfather     No Known Problems Paternal Grandmother     No Known Problems Paternal Grandfather     No Known Problems Maternal Aunt     No Known Problems Maternal Aunt     No Known Problems Maternal Aunt     No Known Problems Maternal Aunt     No Known Problems Maternal Aunt     No Known Problems Maternal Aunt     No Known Problems Paternal Aunt        Physical Exam:     Vitals:   Blood Pressure: 130/60 (02/10/22 1255)  Pulse: 56 (02/10/22 1255)  Temperature: 98 3 °F (36 8 °C) (02/10/22 1255)  Temp Source: Oral (02/10/22 1255)  Respirations: 16 (02/10/22 1255)  Height: 5' 8" (172 7 cm) (02/10/22 1255)  SpO2: 96 % (02/10/22 1255)    Physical Exam  Constitutional:       Appearance: She is obese  She is not ill-appearing or diaphoretic  HENT:      Head: Normocephalic and atraumatic  Nose: No rhinorrhea  Eyes:      General: No scleral icterus  Neck:      Comments: Thick neck  Could not appreciate JV  Cardiovascular:      Rate and Rhythm: Regular rhythm  Bradycardia present  Heart sounds: No murmur heard  No gallop  Pulmonary:      Effort: Pulmonary effort is normal  No respiratory distress        Breath sounds: No wheezing or rales  Abdominal:      General: Abdomen is flat  There is no distension  Palpations: Abdomen is soft  Tenderness: There is no abdominal tenderness  Musculoskeletal:      Comments: Plus three pitting edema on both legs   Skin:     Comments: Venous stasis dermatitis on both legs   Neurological:      Mental Status: She is alert and oriented to person, place, and time  Psychiatric:         Mood and Affect: Mood normal          Behavior: Behavior normal      Additional Data:     Lab Results: I have personally reviewed pertinent reports  Results from last 7 days   Lab Units 02/10/22  1324   WBC Thousand/uL 11 06*   HEMOGLOBIN g/dL 9 1*   HEMATOCRIT % 28 1*   PLATELETS Thousands/uL 227   NEUTROS PCT % 74   LYMPHS PCT % 14   MONOS PCT % 7   EOS PCT % 3     Results from last 7 days   Lab Units 02/10/22  1324   SODIUM mmol/L 140   POTASSIUM mmol/L 4 5   CHLORIDE mmol/L 107   CO2 mmol/L 21   BUN mg/dL 84*   CREATININE mg/dL 3 13*   ANION GAP mmol/L 12   CALCIUM mg/dL 8 1*   ALBUMIN g/dL 3 1*   TOTAL BILIRUBIN mg/dL 0 27   ALK PHOS U/L 109   ALT U/L 19   AST U/L 17   GLUCOSE RANDOM mg/dL 145*                       Imaging: I have personally reviewed pertinent films in PACS    XR chest 2 views   Final Result by Loan Chisholm MD (02/10 7685)      Possible trace left pleural effusion  No consolidation or edema  Workstation performed: [id number]             EKG, Pathology, and Other Studies Reviewed on Admission:   · EKG:  Sinus rhythm    Allscripts / Epic Records Reviewed: No     ** Please Note: This note has been constructed using a voice recognition system   **

## 2022-02-10 NOTE — ASSESSMENT & PLAN NOTE
Wt Readings from Last 3 Encounters:   02/10/22 (!) 137 kg (302 lb)   12/16/21 125 kg (276 lb 4 8 oz)   12/15/21 123 kg (272 lb)     Acute on chronic combined congestive heart failure with last known EF of 45% and grade 2 diastolic dysfunction  Maintained on low-dose torsemide 20 mg daily prior to admission  Due to CKD 4 in low GFR, she likely needs higher dose of maintenance diuretics on discharge  For now Start Lasix 40 mg IV b i d  Hold Coreg secondary to bradycardia on telemetry and decompensated CHF

## 2022-02-11 PROBLEM — R79.89 ELEVATED TROPONIN: Status: ACTIVE | Noted: 2022-02-11

## 2022-02-11 PROBLEM — R77.8 ELEVATED TROPONIN: Status: ACTIVE | Noted: 2022-02-11

## 2022-02-11 LAB
ANION GAP SERPL CALCULATED.3IONS-SCNC: 9 MMOL/L (ref 4–13)
BACTERIA UR QL AUTO: ABNORMAL /HPF
BILIRUB UR QL STRIP: NEGATIVE
BUN SERPL-MCNC: 80 MG/DL (ref 5–25)
CALCIUM SERPL-MCNC: 8.3 MG/DL (ref 8.3–10.1)
CHLORIDE SERPL-SCNC: 108 MMOL/L (ref 100–108)
CLARITY UR: CLEAR
CO2 SERPL-SCNC: 25 MMOL/L (ref 21–32)
COLOR UR: YELLOW
CREAT SERPL-MCNC: 3.07 MG/DL (ref 0.6–1.3)
ERYTHROCYTE [DISTWIDTH] IN BLOOD BY AUTOMATED COUNT: 16.3 % (ref 11.6–15.1)
FERRITIN SERPL-MCNC: 42 NG/ML (ref 8–388)
GFR SERPL CREATININE-BSD FRML MDRD: 15 ML/MIN/1.73SQ M
GLUCOSE SERPL-MCNC: 103 MG/DL (ref 65–140)
GLUCOSE SERPL-MCNC: 104 MG/DL (ref 65–140)
GLUCOSE SERPL-MCNC: 187 MG/DL (ref 65–140)
GLUCOSE SERPL-MCNC: 206 MG/DL (ref 65–140)
GLUCOSE SERPL-MCNC: 206 MG/DL (ref 65–140)
GLUCOSE UR STRIP-MCNC: NEGATIVE MG/DL
HCT VFR BLD AUTO: 23.6 % (ref 34.8–46.1)
HGB BLD-MCNC: 7.6 G/DL (ref 11.5–15.4)
HGB UR QL STRIP.AUTO: ABNORMAL
IRON SATN MFR SERPL: 19 % (ref 15–50)
IRON SERPL-MCNC: 41 UG/DL (ref 50–170)
KETONES UR STRIP-MCNC: NEGATIVE MG/DL
LEUKOCYTE ESTERASE UR QL STRIP: ABNORMAL
MCH RBC QN AUTO: 32.6 PG (ref 26.8–34.3)
MCHC RBC AUTO-ENTMCNC: 32.2 G/DL (ref 31.4–37.4)
MCV RBC AUTO: 101 FL (ref 82–98)
NITRITE UR QL STRIP: POSITIVE
NON-SQ EPI CELLS URNS QL MICRO: ABNORMAL /HPF
PH UR STRIP.AUTO: 6 [PH]
PLATELET # BLD AUTO: 186 THOUSANDS/UL (ref 149–390)
PMV BLD AUTO: 9.5 FL (ref 8.9–12.7)
POTASSIUM SERPL-SCNC: 3.8 MMOL/L (ref 3.5–5.3)
PROT UR STRIP-MCNC: ABNORMAL MG/DL
RBC # BLD AUTO: 2.33 MILLION/UL (ref 3.81–5.12)
RBC #/AREA URNS AUTO: ABNORMAL /HPF
SODIUM SERPL-SCNC: 142 MMOL/L (ref 136–145)
SP GR UR STRIP.AUTO: 1.02 (ref 1–1.03)
TIBC SERPL-MCNC: 216 UG/DL (ref 250–450)
UROBILINOGEN UR QL STRIP.AUTO: 0.2 E.U./DL
WBC # BLD AUTO: 7.79 THOUSAND/UL (ref 4.31–10.16)
WBC #/AREA URNS AUTO: ABNORMAL /HPF

## 2022-02-11 PROCEDURE — 80048 BASIC METABOLIC PNL TOTAL CA: CPT | Performed by: INTERNAL MEDICINE

## 2022-02-11 PROCEDURE — 99232 SBSQ HOSP IP/OBS MODERATE 35: CPT | Performed by: INTERNAL MEDICINE

## 2022-02-11 PROCEDURE — 83550 IRON BINDING TEST: CPT | Performed by: NURSE PRACTITIONER

## 2022-02-11 PROCEDURE — 99223 1ST HOSP IP/OBS HIGH 75: CPT

## 2022-02-11 PROCEDURE — 82728 ASSAY OF FERRITIN: CPT | Performed by: NURSE PRACTITIONER

## 2022-02-11 PROCEDURE — 82948 REAGENT STRIP/BLOOD GLUCOSE: CPT

## 2022-02-11 PROCEDURE — 83540 ASSAY OF IRON: CPT | Performed by: NURSE PRACTITIONER

## 2022-02-11 PROCEDURE — 85027 COMPLETE CBC AUTOMATED: CPT | Performed by: INTERNAL MEDICINE

## 2022-02-11 PROCEDURE — 81001 URINALYSIS AUTO W/SCOPE: CPT | Performed by: INTERNAL MEDICINE

## 2022-02-11 PROCEDURE — 99223 1ST HOSP IP/OBS HIGH 75: CPT | Performed by: INTERNAL MEDICINE

## 2022-02-11 RX ADMIN — OLANZAPINE 5 MG: 5 TABLET, FILM COATED ORAL at 21:17

## 2022-02-11 RX ADMIN — CLOPIDOGREL BISULFATE 75 MG: 75 TABLET ORAL at 09:48

## 2022-02-11 RX ADMIN — LEVOTHYROXINE SODIUM 50 MCG: 50 TABLET ORAL at 05:08

## 2022-02-11 RX ADMIN — HYDROCODONE BITARTRATE AND ACETAMINOPHEN 1 TABLET: 5; 325 TABLET ORAL at 21:18

## 2022-02-11 RX ADMIN — ASPIRIN 81 MG: 81 TABLET, COATED ORAL at 09:48

## 2022-02-11 RX ADMIN — INSULIN LISPRO 15 UNITS: 100 INJECTION, SOLUTION INTRAVENOUS; SUBCUTANEOUS at 16:54

## 2022-02-11 RX ADMIN — TIZANIDINE 2 MG: 4 TABLET ORAL at 21:17

## 2022-02-11 RX ADMIN — GABAPENTIN 300 MG: 300 CAPSULE ORAL at 09:48

## 2022-02-11 RX ADMIN — INSULIN LISPRO 15 UNITS: 100 INJECTION, SOLUTION INTRAVENOUS; SUBCUTANEOUS at 12:22

## 2022-02-11 RX ADMIN — HEPARIN SODIUM 5000 UNITS: 5000 INJECTION INTRAVENOUS; SUBCUTANEOUS at 05:08

## 2022-02-11 RX ADMIN — HEPARIN SODIUM 5000 UNITS: 5000 INJECTION INTRAVENOUS; SUBCUTANEOUS at 21:18

## 2022-02-11 RX ADMIN — ISOSORBIDE MONONITRATE 60 MG: 60 TABLET, EXTENDED RELEASE ORAL at 09:47

## 2022-02-11 RX ADMIN — PANTOPRAZOLE SODIUM 40 MG: 40 TABLET, DELAYED RELEASE ORAL at 06:28

## 2022-02-11 RX ADMIN — HYDROCODONE BITARTRATE AND ACETAMINOPHEN 1 TABLET: 5; 325 TABLET ORAL at 05:08

## 2022-02-11 RX ADMIN — FUROSEMIDE 40 MG: 10 INJECTION, SOLUTION INTRAMUSCULAR; INTRAVENOUS at 09:48

## 2022-02-11 RX ADMIN — ATORVASTATIN CALCIUM 40 MG: 40 TABLET, FILM COATED ORAL at 16:55

## 2022-02-11 RX ADMIN — INSULIN LISPRO 4 UNITS: 100 INJECTION, SOLUTION INTRAVENOUS; SUBCUTANEOUS at 12:21

## 2022-02-11 RX ADMIN — CITALOPRAM HYDROBROMIDE 40 MG: 20 TABLET ORAL at 09:48

## 2022-02-11 RX ADMIN — INSULIN LISPRO 2 UNITS: 100 INJECTION, SOLUTION INTRAVENOUS; SUBCUTANEOUS at 16:54

## 2022-02-11 RX ADMIN — GABAPENTIN 300 MG: 300 CAPSULE ORAL at 16:55

## 2022-02-11 RX ADMIN — HEPARIN SODIUM 5000 UNITS: 5000 INJECTION INTRAVENOUS; SUBCUTANEOUS at 14:42

## 2022-02-11 RX ADMIN — INSULIN GLARGINE 50 UNITS: 100 INJECTION, SOLUTION SUBCUTANEOUS at 21:18

## 2022-02-11 RX ADMIN — FUROSEMIDE 40 MG: 10 INJECTION, SOLUTION INTRAMUSCULAR; INTRAVENOUS at 16:55

## 2022-02-11 RX ADMIN — PANTOPRAZOLE SODIUM 40 MG: 40 TABLET, DELAYED RELEASE ORAL at 16:54

## 2022-02-11 RX ADMIN — TIZANIDINE 2 MG: 4 TABLET ORAL at 05:08

## 2022-02-11 RX ADMIN — ALLOPURINOL 100 MG: 100 TABLET ORAL at 09:48

## 2022-02-11 NOTE — PROGRESS NOTES
[address] [facility]  Progress Note - Lidaalysonshantel Members 1960, 63 y o  female MRN: [id number]  Unit/Bed#: E5 -01 Encounter: [id number]  Primary Care Provider: CHERELLE Peñaloza   Date and time admitted to hospital: 2/10/2022 12:46 PM    * 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 heart failure ([facility] [address] )  Assessment & Plan  Wt Readings from Last 3 Encounters:   02/11/22 135 kg (296 lb 11 8 oz)   02/10/22 (!) 137 kg (302 lb)   12/16/21 125 kg (276 lb 4 8 oz)     This is a 61-year-old female with history of CKD stage 4, depression, diabetes mellitus, hypertension, hyperlipidemia, CHF presented with progressive worsening dyspnea and lower extremity edema for the past 2 weeks  · NT proBNP 2094, chest x-ray with trace left pleural effusion, significant lower extremity edema  · Cardiology follow-up appreciated  · Continue with furosemide IV 40 mg b i d   · Continue aspirin, Plavix, statin, Imdur or  · Monitor I and O, daily weight, fluid restriction  · Maintained on low-dose torsemide 20 mg daily prior to admission  Acute renal failure superimposed on stage 4 [facility]  Assessment & Plan  · Acute renal failure on CKD stage 4  · Baseline creatinine in the mid 2s  · Creatinine 3 1 on admission  · Acute kidney injury likely secondary to cardiorenal and volume overload  · Nephrology consultation appreciated  · Urinalysis, bladder scan  · Continue with IV diuretics  · Closely monitor renal function    CAD (coronary artery disease)  Assessment & Plan  · History of multiple stents to the LAD and recent stenting to the RCA and August 2021  · Continue dual anti-platelet with aspirin and Plavix, statin, Imdur or  · Hold Coreg due to bradycardia and compensated CHF        Type 2 diabetes mellitus with stage 4 chronic kidney disease, with long-term current use of insulin [facility])  Assessment & Plan  Lab Results   Component Value Date    HGBA1C 8 5 (A) 12/16/2021 · Continue Lantus 50 units at night with Humalog 15 units with meals and sliding scale  · Monitor Accu-Cheks, sliding scale for coverage          VTE Pharmacologic Prophylaxis:   Pharmacologic:  Heparin    Patient Centered Rounds: I have performed bedside rounds with nursing staff today  Education and Discussions with Family / Patient: Updated daughter-in-law Priya as per patients request    Time Spent for Care: 20 minutes  More than 50% of total time spent on counseling and coordination of care as described above  Current Length of Stay: 1 day(s)    Current Patient Status: Inpatient   Certification Statement: The patient will continue to require additional inpatient hospital stay due to IV diuretics    Discharge Plan / Estimated Discharge Date: TBD    Code Status: Level 1 - Full Code      Subjective:   Patient seen and examined at bedside, complaining of having dyspnea with exertion, lower extremity swelling    Objective:     Vitals:   Temp (24hrs), Av 8 °F (36 6 °C), Min:97 7 °F (36 5 °C), Max:97 9 °F (36 6 °C)    Temp:  [97 7 °F (36 5 °C)-97 9 °F (36 6 °C)] 97 7 °F (36 5 °C)  HR:  [62-69] 64  Resp:  [14-18] 14  BP: (129-159)/(56-78) 155/59  SpO2:  [95 %-98 %] 95 %  Body mass index is 45 12 kg/m²  Input and Output Summary (last 24 hours): Intake/Output Summary (Last 24 hours) at 2022 1638  Last data filed at 2022 1416  Gross per 24 hour   Intake 582 ml   Output 3000 ml   Net -2418 ml       Physical Exam:    Constitutional: Patient is oriented to person, place and time, no acute distress  HEENT:  Normocephalic, atraumatic  Cardiovascular: Normal S1S2, RRR, No murmurs/rubs/gallops appreciated    Pulmonary:  Bilateral air entry, No rhonchi/rales/wheezing appreciated  Abdominal: Soft, Bowel sounds present, Non-tender, Non-distended  Extremities:  Bilateral lower extremity pitting edema  Neurological: Cranial nerves II-XII grossly intact, sensation intact, otherwise no focal neurological symptoms  Skin:  Warm, dry  Suprapubic catheter in place    Additional Data:     Labs:    Results from last 7 days   Lab Units 02/11/22  0633 02/10/22  1817 02/10/22  1324   WBC Thousand/uL 7 79   < > 11 06*   HEMOGLOBIN g/dL 7 6*   < > 9 1*   HEMATOCRIT % 23 6*   < > 28 1*   PLATELETS Thousands/uL 186   < > 227   NEUTROS PCT %  --   --  74   LYMPHS PCT %  --   --  14   MONOS PCT %  --   --  7   EOS PCT %  --   --  3    < > = values in this interval not displayed  Results from last 7 days   Lab Units 02/11/22  0633 02/10/22  1324 02/10/22  1324   POTASSIUM mmol/L 3 8   < > 4 5   CHLORIDE mmol/L 108   < > 107   CO2 mmol/L 25   < > 21   BUN mg/dL 80*   < > 84*   CREATININE mg/dL 3 07*   < > 3 13*   CALCIUM mg/dL 8 3   < > 8 1*   ALK PHOS U/L  --   --  109   ALT U/L  --   --  19   AST U/L  --   --  17    < > = values in this interval not displayed  I Have Reviewed All Lab Data Listed Above          Recent Cultures (last 7 days):           Last 24 Hours Medication List:   Current Facility-Administered Medications   Medication Dose Route Frequency Provider Last Rate    acetaminophen  650 mg Oral Q4H PRN Shannon Turner PA-C      allopurinol  100 mg Oral Daily Rachel Muhammad MD      aspirin  81 mg Oral Daily Rachel Muhammad MD      atorvastatin  40 mg Oral After Nicolette Mcdonald MD      citalopram  40 mg Oral Daily Rachel Muhammad MD      clopidogrel  75 mg Oral Daily Rachel Muhammad MD      furosemide  40 mg Intravenous BID Rachel Muhammad MD      gabapentin  300 mg Oral BID Rachel Muhammad MD      heparin (porcine)  5,000 Units Subcutaneous [facility] Rachel Muhammad MD      HYDROcodone-acetaminophen  1 tablet Oral Q8H PRN Shannon Turner PA-C      insulin glargine  50 Units Subcutaneous HS Rachel Muhammad MD      insulin lispro  15 Units Subcutaneous TID With Meals Betsy Guerrero Nick Gamboa MD      insulin lispro  2-12 Units Subcutaneous TID [facility] Rachel Muhammad MD      isosorbide mononitrate  60 mg Oral Daily Rachel Muhammad MD      levothyroxine  50 mcg Oral Daily Rachel Muhammad MD      OLANZapine  5 mg Oral HS Rachel Muhammad MD      pantoprazole  40 mg Oral BID Rachel Muhammad MD      tiZANidine  2 mg Oral Q8H PRN Rachel Muhammad MD          Today, Patient Was Seen By: Aram Kearney MD

## 2022-02-11 NOTE — ASSESSMENT & PLAN NOTE
Wt Readings from Last 3 Encounters:   02/11/22 135 kg (296 lb 11 8 oz)   02/10/22 (!) 137 kg (302 lb)   12/16/21 125 kg (276 lb 4 8 oz)     This is a 61-year-old female with history of CKD stage 4, depression, diabetes mellitus, hypertension, hyperlipidemia, CHF presented with progressive worsening dyspnea and lower extremity edema for the past 2 weeks  · NT proBNP 2094, chest x-ray with trace left pleural effusion, significant lower extremity edema  · Cardiology follow-up appreciated  · Continue with furosemide IV 40 mg b i d   · Continue aspirin, Plavix, statin, Imdur or  · Monitor I and O, daily weight, fluid restriction  · Maintained on low-dose torsemide 20 mg daily prior to admission

## 2022-02-11 NOTE — ASSESSMENT & PLAN NOTE
· Acute renal failure on CKD stage 4  · Baseline creatinine in the mid 2s  · Creatinine 3 1 on admission  · Acute kidney injury likely secondary to cardiorenal and volume overload  · Nephrology consultation appreciated  · Urinalysis, bladder scan  · Continue with IV diuretics  · Closely monitor renal function

## 2022-02-11 NOTE — ASSESSMENT & PLAN NOTE
· History of multiple stents to the LAD and recent stenting to the RCA and August 2021  · Continue dual anti-platelet with aspirin and Plavix, statin, Imdur or  · Hold Coreg due to bradycardia and compensated CHF

## 2022-02-11 NOTE — CONSULTS
Consultation - Nephrology   Yasir Greco 63 y o  female MRN: [id number]  Unit/Bed#: E5 -01 Encounter: [id number]    ASSESSMENT AND PLAN:  Patient is 82-year-old female with significant medical issues of uncontrolled diabetes, progressive CKD stage 4, CHF, presented with significantly worsening dyspnea leg edema weight gain  We are consulted for ALFRED/CKD management  ALFRED POA on CKD stage 4, baseline creatinine seems to be low to mid 2s, follows with Dr Boo Poster  -creatinine 3 1 on admission slightly improved 3 0 today  -ALFRED suspect in the setting of cardiorenal, volume overload  -IV diuresis as below as per Cardiology  -check bladder scan for PVR  -check UA with microscopy  -avoid nephrotoxins or NSAIDs  -CKD suspect in the setting of long-term uncontrolled diabetes, morbid obesity    Systolic/diastolic CHF exacerbation  -prior echo shows EF 71%, grade 2 diastolic dysfunction  -clinically seems volume overloaded  -currently on Lasix 40 mg IV b i d  as per Cardiology   -daily standing scale weight, accurate intake and output  -currently remains on room air    Nephrotic range Proteinuria  -prior urine microalbumin/creatinine ratio 5 2 g in August 2021  -suspected secondary to uncontrolled diabetes, morbid obesity can cause secondary FSGS  -not on ACE-inhibitor/ARB as outpatient, continue to avoid for now given ALFRED    Hypertension  -BP fluctuating with occasional high readings  -currently on Imdur, IV Lasix  -also suspect volume mediated component, continue to monitor with aggressive diuresis  -goal SBP 130s    Discussed above plan in detail with primary team     HISTORY OF PRESENT ILLNESS:  Requesting Physician: Alexa Graham MD  Reason for Consult:  ALFRED/CKD    Yasir Greco is a 63y o  year old female who was admitted to [facility] after presenting with worsening shortness of breath, leg edema, weight gain  A renal consultation is requested today for assistance in the management of ALFRED/CKD    Old medical records including prior creatinine values were reviewed from  [facility] records  Outpatient nephrology notes were reviewed  Patient started having worsening shortness of breath along with leg edema and about 15 to 20 lb weight gain over last several weeks  She presented to the hospital due to same  She was also found to have ALFRED on CKD  Currently she denies any nausea, vomiting  Denies any recent NSAID use  Denies any urinary complaint      PAST MEDICAL HISTORY:  Past Medical History:   Diagnosis Date    Abnormal liver function     Anemia     Anxiety     Arthritis     Chronic kidney disease     stage 3    Chronic narcotic dependence (HCC)     Chronic pain disorder     lower back, hands , neck and knees    Coronary artery disease     Depression     Diabetes mellitus ([address] )     GERD (gastroesophageal reflux disease)     no meds at present    Heart murmur     murmur    Hyperlipidemia     Hypertension     Neurogenic bladder     Open toe wound 12/2020    right big toe open calus but is dry at present    Renal disorder     Shortness of breath     exertion    Sleep apnea     doesn't use cpap    Suprapubic catheter ([address] )        PAST SURGICAL HISTORY:  Past Surgical History:   Procedure Laterality Date    AMPUTATION      ANGIOPLASTY  2017    5    BREAST EXCISIONAL BIOPSY Left     BREAST SURGERY      CARDIAC CATHETERIZATION      CARPAL TUNNEL RELEASE Bilateral     CERVICAL FUSION      HYSTERECTOMY  2008    IR SUPRAPUBIC CATHETER PLACEMENT  6/15/2021    KNEE SURGERY      OOPHORECTOMY  2008    IA EXC SKIN BENIG 3 1-4 CM REMAINDR BODY N/A 12/21/2020    Procedure: EXCISION SEBACEOUS CYST X 5 SCALP;  Surgeon: Stevie Veliz MD;  Location: [address] [place] [address];  Service: General    TOE AMPUTATION Left     TRIGGER FINGER RELEASE Right     4th Finger       ALLERGIES:  Allergies   Allergen Reactions    Codeine      Other reaction(s): Nausea and Vomiting    Latex Itching       SOCIAL HISTORY:  Social History     Substance and Sexual Activity   Alcohol Use Not Currently     Social History     Substance and Sexual Activity   Drug Use Never     Social History     Tobacco Use   Smoking Status Former Smoker    Packs/day: 1 00    Years: 35 00    Pack years: 35 00    Types: Cigarettes    Quit date: 2012    Years since quitting: 10 1   Smokeless Tobacco Never Used       FAMILY HISTORY:  Family History   Problem Relation Age of Onset    Stroke Father     Heart disease Father     No Known Problems Mother     No Known Problems Sister     No Known Problems Daughter     No Known Problems Maternal Grandmother     No Known Problems Maternal Grandfather     No Known Problems Paternal Grandmother     No Known Problems Paternal Grandfather     No Known Problems Maternal Aunt     No Known Problems Maternal Aunt     No Known Problems Maternal Aunt     No Known Problems Maternal Aunt     No Known Problems Maternal Aunt     No Known Problems Maternal Aunt     No Known Problems Paternal Aunt        MEDICATIONS:    Current Facility-Administered Medications:     acetaminophen (TYLENOL) tablet 650 mg, 650 mg, Oral, Q4H PRN, Vilma Alanis PA-C    allopurinol (ZYLOPRIM) tablet 100 mg, 100 mg, Oral, Daily, Della Valverde MD, 100 mg at 02/11/22 0948    aspirin (ECOTRIN LOW STRENGTH) EC tablet 81 mg, 81 mg, Oral, Daily, Della Valverde MD, 81 mg at 02/11/22 0948    atorvastatin (LIPITOR) tablet 40 mg, 40 mg, Oral, After Dinner, Della Valverde MD, 40 mg at 02/10/22 1754    citalopram (CeleXA) tablet 40 mg, 40 mg, Oral, Daily, Della Valverde MD, 40 mg at 02/11/22 0948    clopidogrel (PLAVIX) tablet 75 mg, 75 mg, Oral, Daily, Della Valverde MD, 75 mg at 02/11/22 0948    furosemide (LASIX) injection 40 mg, 40 mg, Intravenous, BID, Della Valverde MD, 40 mg at 02/11/22 0948    gabapentin (NEURONTIN) capsule 300 mg, 300 mg, Oral, BID, Bree Staples MD, 300 mg at 02/11/22 7225    heparin (porcine) subcutaneous injection 5,000 Units, 5,000 Units, Subcutaneous, Q8H [address], 5,000 Units at 02/11/22 1442 **AND** [COMPLETED] Platelet count, , , Once, Bree Staples MD    HYDROcodone-acetaminophen [facility]) 5-325 mg per tablet 1 tablet, 1 tablet, Oral, Q8H PRN, Freddy Sloan PA-C, 1 tablet at 02/11/22 0508    insulin glargine (LANTUS) subcutaneous injection 50 Units 0 5 mL, 50 Units, Subcutaneous, HS, Bree Staples MD, 50 Units at 02/10/22 2219    insulin lispro (HumaLOG) 100 units/mL subcutaneous injection 15 Units, 15 Units, Subcutaneous, TID With Meals, Bree Staples MD, 15 Units at 02/11/22 1222    insulin lispro (HumaLOG) 100 units/mL subcutaneous injection 2-12 Units, 2-12 Units, Subcutaneous, TID AC, 4 Units at 02/11/22 1221 **AND** Fingerstick Glucose (POCT), , , TID AC, Bree Staples MD    isosorbide mononitrate (IMDUR) 24 hr tablet 60 mg, 60 mg, Oral, Daily, Bree Staples MD, 60 mg at 02/11/22 6048    levothyroxine tablet 50 mcg, 50 mcg, Oral, Daily, Bree Staples MD, 50 mcg at 02/11/22 0508    OLANZapine (ZyPREXA) tablet 5 mg, 5 mg, Oral, HS, Bree Staples MD, 5 mg at 02/10/22 2219    pantoprazole (PROTONIX) EC tablet 40 mg, 40 mg, Oral, BID, Bree Staples MD, 40 mg at 02/11/22 4460    tiZANidine (ZANAFLEX) tablet 2 mg, 2 mg, Oral, Q8H PRN, Bree Staples MD, 2 mg at 02/11/22 8420    REVIEW OF SYSTEMS:  Complete 10 point review of systems were obtained and discussed in length with the patient  Complete review of systems were negative / unremarkable except mentioned in the HPI section       PHYSICAL EXAM:  Current Weight: Weight - Scale: 135 kg (296 lb 11 8 oz)  First Weight: Weight - Scale: 135 kg (296 lb 11 8 oz)  Vitals:    02/11/22 1517   BP: 155/59   Pulse: 64   Resp: 14   Temp: 97 7 °F (36 5 °C)   SpO2: 95%       Intake/Output Summary (Last 24 hours) at 2/11/2022 1558  Last data filed at 2/11/2022 1416  Gross per 24 hour   Intake 582 ml   Output 3000 ml   Net -2418 ml     Wt Readings from Last 3 Encounters:   02/11/22 135 kg (296 lb 11 8 oz)   02/10/22 (!) 137 kg (302 lb)   12/16/21 125 kg (276 lb 4 8 oz)     Temp Readings from Last 3 Encounters:   02/11/22 97 7 °F (36 5 °C)   02/10/22 97 6 °F (36 4 °C) (Temporal)   01/11/22 (!) 97 °F (36 1 °C) (Tympanic)     BP Readings from Last 3 Encounters:   02/11/22 155/59   02/10/22 142/90   01/11/22 127/66     Pulse Readings from Last 3 Encounters:   02/11/22 64   02/10/22 85   01/11/22 71        Physical Examination:  General:  Lying in bed, no acute distress  Eyes:  Mild conjunctival pallor present  ENT:  External examination of ears and nose unremarkable  Neck:  No obvious lymphadenopathy appreciated  Respiratory:  Bilateral entry present, occasional inspiratory crackles present  CVS:  S1, S2 present  GI:  Soft, nondistended  CNS:  Active alert oriented x3  Extremities:  2+ edema in both legs  Psych:  Conscious, coherent, oriented  Skin:  No new rash in legs    Invasive Devices:      Lab Results:   Results from last 7 days   Lab Units 02/11/22  0633 02/10/22  1817 02/10/22  1324   WBC Thousand/uL 7 79  --  11 06*   HEMOGLOBIN g/dL 7 6*  --  9 1*   HEMATOCRIT % 23 6*  --  28 1*   PLATELETS Thousands/uL 186 219 227   POTASSIUM mmol/L 3 8  --  4 5   CHLORIDE mmol/L 108  --  107   CO2 mmol/L 25  --  21   BUN mg/dL 80*  --  84*   CREATININE mg/dL 3 07*  --  3 13*   CALCIUM mg/dL 8 3  --  8 1*       Other Studies:   XR chest 2 views   Final Result by Barry Stewart MD (02/10 1515)      Possible trace left pleural effusion  No consolidation or edema  Workstation performed: [id number]         Chest x-ray images personally reviewed which shows possible small left pleural effusion      Portions of the record may have been created with voice recognition software  Occasional wrong word or "sound a like" substitutions may have occurred due to the inherent limitations of voice recognition software  Read the chart carefully and recognize, using context, where substitutions have occurred

## 2022-02-11 NOTE — CONSULTS
Consult - Cardiology   Eric Peña 63 y o  female MRN: [id number]  Unit/Bed#: E5 -01 Encounter: [id number]        Reason For Consult:  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 heart failure               ASSESSMENT:  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 heart failure   - LVEF 45%, regional wall motion abnormalities, severe hypokinesis of the basal-mid anteroseptal and basal-mid inferoseptal walls, moderate hypokinesis of the basal-mid inferior walls, wall thickness mildly increased, mild concentric hypertrophy, grade 2 diastolic dysfunction, dyssynergic ventricular septal motion consistent with LBBB, mild progressive mitral stenosis (mean gradient 5 mmHg at 73 bpm), mild TR, trace NE, trace pericardial effusion, 8/24/21    - OP diuretic Rx, torsemide 20 mg daily  - most recent office weight 132 kg on 9/15/21  Elevated troponin  Acute renal failure on CKD stage 4  Acute anemia on anemia of chronic disease  Coronary artery disease   - S/P PCI x 5 with prior stents to LAD and the circumflex artery  - most recent cardiac catheterization 8/31/21: Proximal LAD discrete 30% stenosis at site of prior stent, proximal % stenosis- chronic total lesion    - Rx, isosorbide mononitrate 60 mg daily, carvedilol 25 mg BID, Plavix 75 mg daily, aspirin 81 mg daily  Hypertension  Dyslipidemia   - OP Rx, atorvastatin 40 mg daily  - most recent lipid panel 8/3/21: Cholesterol 152, triglycerides 180, HDL 30, LDL 86      Other:  - neurogenic bladder status post suprapubic catheter  - obesity   - type 2 diabetes mellitus  - chronic narcotic dependence  - depression    PLAN/ DISCUSSION:     · Chest x-ray upon admission revealed possible trace left pleural effusion, NT pro BNP 2094 + approximately 20 lb weight gain; S/P furosemide 80 mg IV x 1 upon arrival, currently on furosemide 40 mg IV BID, will continue same and monitor response     · Elevated troponin with high sensitivity troponin trend 186, 396, 418; suspect non- MI troponin elevation in the setting of acute heart failure with acute renal dysfunction on advanced kidney disease with CKD stage 4, acute on chronic anemia upon underlying CAD  · Currently on aspirin 81 daily, Plavix 75 mg daily, atorvastatin 40 mg daily, isosorbide mononitrate 60 mg daily  Carvedilol on hold for now  · Recommend to check iron panel; may warrant PRBC transfusion in light of anemia, would provide additional diuretics post transfusion to prevent volume overload  · Monitor volume status closely with strict intake/output, daily weight  · Monitor renal function and electrolytes closely; maintain potassium > 4, magnesium > 2   · Recommend nephrology consultation in light of advanced kidney disease while receiving diuresis  History Of Present Illness:  56-year-old female presented to [facility] per recommendation of PCP due to shortness of breath, increased lower extremity edema in addition to 20 lb weight gain over the past 2 weeks  Upon assessment and evaluation, patient resting at the edge of the bed comfortably  She states that her breathing has improved since admission  Per patient, she has had increased shortness of breath over the past 2 weeks  She endorses worsening dyspnea on exertion especially when she is walking up the stairs  When she has significant shortness of breath, she endorses associated chest tightness  Additionally, patient states she will feel her heart racing at times and also will feel dizzy and lightheaded; these will not always occur with exertion, they occur at random times as well per her  Patient notes that she has been watching her weight increase and also has noticed a decrease in amount of urine production with her current outpatient diuretic regimen  She is compliant with her medications  Please see significant cardiac history and problems enumerated above   Patient follows with Dr Smith Citikaci with Cardiology as an outpatient with most recent visit on 9/15/21  Office note reviewed; patient relatively stable but did still have symptoms of stable angina which have improved since initiating isosorbide mononitrate thus isosorbide mononitrate increased to 60 mg daily  Past Medical History:        Past Medical History:   Diagnosis Date    Abnormal liver function     Anemia     Anxiety     Arthritis     Chronic kidney disease     stage 3    Chronic narcotic dependence (HCC)     Chronic pain disorder     lower back, hands , neck and knees    Coronary artery disease     Depression     Diabetes mellitus (HCC)     GERD (gastroesophageal reflux disease)     no meds at present    Heart murmur     murmur    Hyperlipidemia     Hypertension     Neurogenic bladder     Open toe wound 12/2020    right big toe open calus but is dry at present    Renal disorder     Shortness of breath     exertion    Sleep apnea     doesn't use cpap    Suprapubic catheter [facility])       Past Surgical History:   Procedure Laterality Date    AMPUTATION      ANGIOPLASTY  2017    5    BREAST EXCISIONAL BIOPSY Left     BREAST SURGERY      CARDIAC CATHETERIZATION      CARPAL TUNNEL RELEASE Bilateral     CERVICAL FUSION      HYSTERECTOMY  2008    IR SUPRAPUBIC CATHETER PLACEMENT  6/15/2021    KNEE SURGERY      OOPHORECTOMY  2008    CO EXC SKIN BENIG 3 1-4 CM REMAINDR BODY N/A 12/21/2020    Procedure: EXCISION SEBACEOUS CYST X 5 SCALP;  Surgeon: Vimal Spangler MD;  Location: [address] [place] [address];  Service: General    TOE AMPUTATION Left     TRIGGER FINGER RELEASE Right     4th Finger        Allergy:        Allergies   Allergen Reactions    Codeine      Other reaction(s): Nausea and Vomiting    Latex Itching       Medications:       Prior to Admission medications    Medication Sig Start Date End Date Taking?  Authorizing Provider   amLODIPine (NORVASC) 5 mg tablet TAKE 1 TABLET BY MOUTH EVERY DAY 1/31/22  Yes Rashad Cueva CHERELLE   aspirin (ECOTRIN LOW STRENGTH) 81 mg EC tablet TAKE 1 TABLET (81 MG TOTAL) BY MOUTH ONCE DAILY 1/3/22  Yes CHERELLE Ortiz   atorvastatin (LIPITOR) 40 mg tablet TAKE 1 TABLET BY MOUTH EVERY DAY 12/6/21  Yes CHERELLE Ortiz   bisacodyl (DULCOLAX) 5 mg EC tablet Take as instructed on bowel preparation instructions 1/11/22  Yes Danial Zuñiga MD   carvedilol (COREG) 25 mg tablet TAKE 1 TABLET BY MOUTH TWICE A DAY WITH FOOD 1/11/22  Yes Arlin Murguia DO   citalopram (CeleXA) 40 mg tablet Take 1 tablet (40 mg total) by mouth daily 6/14/21  Yes CHERELLE Ortiz   clopidogrel (PLAVIX) 75 mg tablet TAKE 1 TABLET BY MOUTH EVERY DAY 1/3/22  Yes CHERELLE Ortiz   diphenhydrAMINE (BENADRYL) 25 mg capsule Take 25 mg by mouth every 4 (four) hours as needed for allergies or sleep    Yes Historical MD Bety   docusate sodium (COLACE) 100 mg capsule Take 1 capsule (100 mg total) by mouth 2 (two) times a day 7/13/21  Yes CHERELLE Ortiz   ferrous sulfate 325 (65 Fe) mg tablet Take 1 tablet (325 mg total) by mouth every other day 12/14/21  Yes CHERELLE Ortiz   gabapentin (NEURONTIN) 600 MG tablet TAKE 1 TABLET (600 MG TOTAL) BY MOUTH 4 (FOUR) TIMES A DAY 10/25/21  Yes CHERELLE Ortiz   isosorbide mononitrate (IMDUR) 60 mg 24 hr tablet Take 1 tablet (60 mg total) by mouth daily 9/15/21  Yes Arlin Murguia DO   levothyroxine 50 mcg tablet TAKE 1 TABLET BY MOUTH EVERY DAY 2/4/22  Yes CHERELLE Ortiz   nitroglycerin (NITROSTAT) 0 4 mg SL tablet Place 1 tablet (0 4 mg total) under the tongue every 5 (five) minutes as needed for chest pain 4/26/21  Yes Arlin Murguia DO   OLANZapine (ZyPREXA) 5 mg tablet TAKE 1 TABLET BY MOUTH EVERYDAY AT BEDTIME 9/28/21  Yes CHERELLE Ortiz   ondansetron (ZOFRAN) 4 mg tablet Take 1 tablet (4 mg total) by mouth every 8 (eight) hours as needed for nausea or vomiting 12/16/21  Yes CHERELLE Ortiz   pantoprazole (PROTONIX) 40 mg tablet Take 1 tablet (40 mg total) by mouth 2 (two) times a day 1/11/22  Yes Jason Armijo MD   tiZANidine (ZANAFLEX) 2 mg tablet Take 1 tablet (2 mg total) by mouth every 8 (eight) hours as needed for muscle spasms 12/16/21  Yes CHERELLE Buitrago   torsemide (DEMADEX) 20 mg tablet Take 1 tablet (20 mg total) by mouth daily 1/6/22 2/10/22 Yes CHERELLE Buitrago   Trulicity 1 5 AK/6 5YR SOPN INJECT 0 5 ML (1 5 MG TOTAL) UNDER THE SKIN ONCE A WEEK 12/13/21  Yes CHERELLE Buitrago   allopurinol (ZYLOPRIM) 300 mg tablet TAKE 1 TABLET BY MOUTH EVERY DAY 11/29/21   CHERELLE Buitrago   collagenase (SANTYL) ointment Apply topically daily 9/10/21   Tim Valverde PA-C   Continuous Blood Gluc  (FreeStyle Kristi Kanner 2 Plummer) YOUNG Use as directed 4/1/21   Kristin Faulkner MD   Continuous Blood Gluc Sensor (FreeStyle Iraida 14 Day Sensor) MISC USE 1 SENSOR EVERY 2 WEEKS 4/1/21   Kristin Faulkner MD   Diclofenac Sodium (VOLTAREN) 1 % Apply 2 g topically 4 (four) times a day  Patient taking differently: Apply 2 g topically as needed  5/21/21   CHERELLE Buitrago   glucose blood (OneTouch Ultra) test strip Use 1 each daily Use as instructed 12/16/21   CHERELLE Buitrago   HYDROcodone-acetaminophen ([facility]) 5-325 mg per tablet Take 1 tablet by mouth 3 (three) times a day as needed for pain For ongoing pain Max Daily Amount: 3 tablets 12/1/21   CHERELLE Buitrago   insulin glargine (Lantus SoloStar) 100 units/mL injection pen Inject 50 Units under the skin every 12 (twelve) hours 11/30/21   CHERELLE Buitrago   insulin lispro (HumaLOG KwikPen) 100 units/mL injection pen Inject 15 Units under the skin 3 (three) times a day with meals (for large meals - high carb, use 18 units)  Patient taking differently: Inject 20 Units under the skin 3 (three) times a day with meals (for large meals - high carb, use 18 units)  12/14/21   CHERELLE Buitrago   Insulin Pen Needle (BD Pen Needle Micro U/F) 32G X 6 MM MISC Use 3 (three) times a day 12/14/21   CHERELLE Galarza   Insulin Syringe-Needle U-100 (BD Veo Insulin Syringe U/F) 31G X 15/64" 0 5 ML MISC Inject under the skin 3 (three) times a day 3/10/21   CHERELLE Paige   isosorbide mononitrate (IMDUR) 30 mg 24 hr tablet TAKE 1 TABLET BY MOUTH EVERY DAY  Patient not taking: Reported on 2/10/2022 12/3/21   Gerhardt Matt, DO   Lancets (OneTouch Delica Plus MUEFPR36Q) MISC USE TO TEST 3 TIMES DAILY 3/10/21   CHERELLE Paige   naloxone [facility]) 4 mg/0 1 mL nasal spray Administer 1 spray into a nostril  If breathing does not return to normal or if breathing difficulty resumes after 2-3 minutes, give another dose in the other nostril using a new spray  3/10/21   CHERELLE Paige   nystatin (MYCOSTATIN) powder Apply topically 2 (two) times a day 9/10/21   Tim Valverde PA-C   polyethylene glycol (GLYCOLAX) 17 GM/SCOOP powder Take 17 g by mouth daily 1/11/22   Khadar Dixon MD   polyethylene glycol (GOLYTELY) 4000 mL solution Take as instructed on bowel preparation instructions  Patient not taking: Reported on 2/10/2022  1/11/22   Khadar Dixon MD   silver sulfadiazine (SILVADENE,SSD) 1 % cream Apply topically daily To burns on fingers, cover w/band-aid  Patient taking differently: Apply topically as needed To burns on fingers, cover w/band-aid   3/10/21   CHERELLE Paige   sucralfate (CARAFATE) 1 g/10 mL suspension Take 10 mL (1 g total) by mouth 4 (four) times a day for 7 days  Patient not taking: Reported on 12/15/2021  10/13/21 12/15/21  CHERELLE Galarza       Family History:     Family History   Problem Relation Age of Onset    Stroke Father     Heart disease Father     No Known Problems Mother     No Known Problems Sister     No Known Problems Daughter     No Known Problems Maternal Grandmother     No Known Problems Maternal Grandfather     No Known Problems Paternal Grandmother     No Known Problems Paternal Grandfather     No Known Problems Maternal Aunt     No Known Problems Maternal Aunt     No Known Problems Maternal Aunt     No Known Problems Maternal Aunt     No Known Problems Maternal Aunt     No Known Problems Maternal Aunt     No Known Problems Paternal Aunt         Social History:       Social History     Socioeconomic History    Marital status:      Spouse name: None    Number of children: None    Years of education: None    Highest education level: None   Occupational History    None   Tobacco Use    Smoking status: Former Smoker     Packs/day: 1 00     Years: 35 00     Pack years: 35 00     Types: Cigarettes     Quit date: 2012     Years since quitting: 10 1    Smokeless tobacco: Never Used   Vaping Use    Vaping Use: Never used   Substance and Sexual Activity    Alcohol use: Not Currently    Drug use: Never    Sexual activity: Not Currently   Other Topics Concern    None   Social History Narrative    None     Social Determinants of Health     Financial Resource Strain: Not on file   Food Insecurity: Not on file   Transportation Needs: Not on file   Physical Activity: Not on file   Stress: Not on file   Social Connections: Not on file   Intimate Partner Violence: Not on file   Housing Stability: Not on file       ROS:  Negative except otherwise aforementioned above  Remainder review of systems is negative    Exam:  General:  alert, oriented and in no distress, cooperative  Head: Normocephalic, atraumatic  Eyes:  EOMI  Pupils - equal, round, reactive to accomodation  No icterus  Normal Conjunctiva     Oropharynx: moist and normal-appearing mucosa  Neck: supple, symmetrical, trachea midline   Heart: regular rate, regular rhythm   Respiratory effort / Chest Inspection: unlabored upon exam   Lungs: diminished breath sounds bilateral bases  Abdomen: obese, rounded, normoactive bowel sounds   Lower Limbs: +1 bilateral lower extremity edema     DATA:      ECG:                       Telemetry: NSR with LBBB Echocardiogram completed on 8/24/21:        LEFT VENTRICLE:  Systolic function was mildly reduced by visual assessment  Ejection fraction was estimated to be 45 %  There were regional wall motion abnormalities that suggest coronary artery disease  There was severe hypokinesis of the basal-mid anteroseptal and basal-mid inferoseptal wall(s)  There was moderate hypokinesis of the basal-mid inferior wall(s)  Wall thickness was mildly increased  There was mild concentric hypertrophy  Features were consistent with grade 2 diastolic dysfunction  Doppler parameters were consistent with high ventricular filling pressure  E/E' was > 19     VENTRICULAR SEPTUM:  There was dyssynergic motion  These changes are consistent with LBBB      MITRAL VALVE:  There was mild "progressive" mitral stenosis  Mean gradient of 5 mmHg at 73 bpm  MVA by Doppler continuity equation is 2 15 cm2      TRICUSPID VALVE:  There was mild regurgitation      PULMONIC VALVE:  There was trace regurgitation      PERICARDIUM:  A trace pericardial effusion was identified  Ischemic Testing/cardiac catheterization completed on 8/31/21:  CORONARY VESSELS:   --  The coronary circulation is left dominant  --  Left main: The vessel was medium to large sized  Angiography showed mild atherosclerosis  --  LAD: The vessel was large sized  Angiography showed no evidence of disease  --  Proximal LAD: There was a discrete 30 % stenosis at the site of a prior stent  --  Mid LAD: There was a 0 % stenosis at the site of a prior stent  --  Distal LAD: There was a 0 % stenosis at the site of a prior stent  --  Proximal circumflex: There was a 0 % stenosis at the site of a prior stent  --  RCA: The vessel was small (non-dominant)  --  Proximal RCA: There was a 100 % stenosis  This lesion is a chronic total occlusion  Weights:     Wt Readings from Last 3 Encounters:   02/11/22 135 kg (296 lb 11 8 oz)   02/10/22 (!) 137 kg (302 lb)   12/16/21 125 kg (276 lb 4 8 oz)   , Body mass index is 45 12 kg/m²           Lab Studies:             Results from last 7 days   Lab Units 02/11/22  0633 02/10/22  1817 02/10/22  1324   WBC Thousand/uL 7 79  --  11 06*   HEMOGLOBIN g/dL 7 6*  --  9 1*   HEMATOCRIT % 23 6*  --  28 1*   PLATELETS Thousands/uL 186 219 227   ,   Results from last 7 days   Lab Units 02/11/22  0633 02/10/22  1324   POTASSIUM mmol/L 3 8 4 5   CHLORIDE mmol/L 108 107   CO2 mmol/L 25 21   BUN mg/dL 80* 84*   CREATININE mg/dL 3 07* 3 13*   CALCIUM mg/dL 8 3 8 1*   ALK PHOS U/L  --  109   ALT U/L  --  19   AST U/L  --  17

## 2022-02-11 NOTE — PLAN OF CARE
Problem: GENITOURINARY - ADULT  Goal: Maintains or returns to baseline urinary function  Description: INTERVENTIONS:  - Assess urinary function  - Encourage oral fluids to ensure adequate hydration if ordered  - Administer IV fluids as ordered to ensure adequate hydration  - Administer ordered medications as needed  - Offer frequent toileting  - Follow urinary retention protocol if ordered  Outcome: Progressing  Goal: Absence of urinary retention  Description: INTERVENTIONS:  - Assess patients ability to void and empty bladder  - Monitor I/O  - Bladder scan as needed  - Discuss with physician/AP medications to alleviate retention as needed  - Discuss catheterization for long term situations as appropriate  Outcome: Progressing  Goal: Urinary catheter remains patent  Description: INTERVENTIONS:  - Assess patency of urinary catheter  - If patient has a chronic morales, consider changing catheter if non-functioning  - Follow guidelines for intermittent irrigation of non-functioning urinary catheter  Outcome: Progressing     Problem: METABOLIC, FLUID AND ELECTROLYTES - ADULT  Goal: Electrolytes maintained within normal limits  Description: INTERVENTIONS:  - Monitor labs and assess patient for signs and symptoms of electrolyte imbalances  - Administer electrolyte replacement as ordered  - Monitor response to electrolyte replacements, including repeat lab results as appropriate  - Instruct patient on fluid and nutrition as appropriate  Outcome: Progressing  Goal: Fluid balance maintained  Description: INTERVENTIONS:  - Monitor labs   - Monitor I/O and WT  - Instruct patient on fluid and nutrition as appropriate  - Assess for signs & symptoms of volume excess or deficit  Outcome: Progressing  Goal: Glucose maintained within target range  Description: INTERVENTIONS:  - Monitor Blood Glucose as ordered  - Assess for signs and symptoms of hyperglycemia and hypoglycemia  - Administer ordered medications to maintain glucose within target range  - Assess nutritional intake and initiate nutrition service referral as needed            Skin Care:  -Avoid use of baby powder, tape, friction and shearing, hot water or constrictive clothing      Outcome: Progressing  Goal: Incision(s), wounds(s) or drain site(s) healing without S/S of infection  Description: INTERVENTIONS  - Assess and document dressing, incision, wound bed, drain sites and surrounding tissue  - Provide patient and family education    Outcome: Progressing       Problem: MUSCULOSKELETAL - ADULT  Goal: Maintain or return mobility to safest level of function  Description: INTERVENTIONS:  - Assess patient's ability to carry out ADLs; assess patient's baseline for ADL function and identify physical deficits which impact ability to perform ADLs (bathing, care of mouth/teeth, toileting, grooming, dressing, etc )  - Assess/evaluate cause of self-care deficits   - Assess range of motion  - Assess patient's mobility  - Assess patient's need for assistive devices and provide as appropriate  - Encourage maximum independence but intervene and supervise when necessary  - Involve family in performance of ADLs  - Assess for home care needs following discharge   - Consider OT consult to assist with ADL evaluation and planning for discharge  - Provide patient education as appropriate  Outcome: Progressing  Goal: Maintain proper alignment of affected body part  Description: INTERVENTIONS:  - Support, maintain and protect limb and body alignment  - Provide patient/ family with appropriate education  Outcome: Progressing     Problem: Potential for Falls  Goal: Patient will remain free of falls  Description: INTERVENTIONS:  - Educate patient/family on patient safety including physical limitations  - Instruct patient to call for assistance with activity   - Consult OT/PT to assist with strengthening/mobility   - Keep Call bell within reach  - Keep bed low and locked with side rails adjusted as appropriate  - Keep care items and personal belongings within reach  - Initiate and maintain comfort rounds  - Make Fall Risk Sign visible to staff  - Apply yellow socks and bracelet for high fall risk patients  - Consider moving patient to room near nurses station  Outcome: Progressing

## 2022-02-11 NOTE — UTILIZATION REVIEW
Initial Clinical Review    Admission: Date/Time/Statement:   Admission Orders (From admission, onward)     Ordered        02/10/22 1443  Inpatient Admission  Once                      Orders Placed This Encounter   Procedures    Inpatient Admission     Standing Status:   Standing     Number of Occurrences:   1     Order Specific Question:   Level of Care     Answer:   Med Surg [16]     Order Specific Question:   Estimated length of stay     Answer:   More than 2 Midnights     Order Specific Question:   Certification     Answer:   I certify that inpatient services are medically necessary for this patient for a duration of greater than two midnights  See H&P and MD Progress Notes for additional information about the patient's course of treatment  ED Arrival Information     Expected Arrival Acuity    - 2/10/2022 12:46 Urgent         Means of arrival Escorted by Service Admission type    [facility] [place] ([address]) Hospitalist Urgent         Arrival complaint    Chf        Chief Complaint   Patient presents with    Shortness of Breath     pt with hx of CHF  comes from office after 20lb weight gain  pt with catheter and also noticed decreased urine output  Initial Presentation: 63 Y O female presents to ED via  EMS from home with shortness of breath, swelling and 20 lb weight gain in past 2 weeks  Has shortness of breath with minimal exertion such as walking around her house or  Going up stairs  Shortness of breath improves with rest   Has noticed worsening swelling of both legs up to thighs with reddish discoloration  Has noticed torsemide not effective, not urinating much  Needs  2 pillows at night due to difficulty sleeping flat  Compliant with meds  Saw PCP the day of admission and ED recommended  Due to volume overload on exam  PMH  Is chronic combined CHF,  CAD, S/P multiple stents,  and CKD  Stage 4      Admit  Ip with Volume Overload, Acute/chronic CHF and ARF on CKD  and plan is  IV lasix  BID, monitor labs, tele, hold coreg due to bradycardia, cardiology  Consult, possibly nephrology consult,  Continue other home meds  Date:     2/11   Day 2:   Cardiology consult  Appears to be in  Decompensated heart failure  Has bilateral lower extremity pitting edema  3 +  Continue IV lasix  BID  Troponin elevation is non MI  Monitor urine output, I & O, daily weight  Recommending nephrology consult  ED Triage Vitals   Temperature Pulse Respirations Blood Pressure SpO2   02/10/22 1255 02/10/22 1255 02/10/22 1255 02/10/22 1255 02/10/22 1255   98 3 °F (36 8 °C) 56 16 130/60 96 %      Temp Source Heart Rate Source Patient Position - Orthostatic VS BP Location FiO2 (%)   02/10/22 1255 02/10/22 1539 02/10/22 1759 02/10/22 1759 --   Oral Monitor Lying Left arm       Pain Score       02/10/22 1539       7          Wt Readings from Last 1 Encounters:   02/11/22 135 kg (296 lb 11 8 oz)     Additional Vital Signs:   -- -- -- -- -- -- None (Room air) --    02/11/22 07:14:37 97 8 °F (36 6 °C) 62 -- 129/56 80 95 % -- --   02/10/22 23:31:38 97 9 °F (36 6 °C) -- 18 139/71 94 -- -- --   02/10/22 20:08:16 97 7 °F (36 5 °C) 69 18 159/78 105 97 % None (Room air) --   02/10/22 1759 -- 68 18 150/65 -- 98 % None (Room air) Lying   02/10/22 1539 -- 69 -- 155/68 -- 96 % None (Room air) --   02/10/22 1255 98 3 °F (36 8 °C) 56 16 130/60 -- 96 % None (Room air) --       Pertinent Labs/Diagnostic Test Results:   CXR   ( 2/10)  Possible trace left pleural effusion   No consolidation or edema       EKG   ( 2/10)      SR  HR  70     Complete LBBB  Results from last 7 days   Lab Units 02/11/22  0633 02/10/22  1817 02/10/22  1324   WBC Thousand/uL 7 79  --  11 06*   HEMOGLOBIN g/dL 7 6*  --  9 1*   HEMATOCRIT % 23 6*  --  28 1*   PLATELETS Thousands/uL 186 219 227   NEUTROS ABS Thousands/µL  --   --  8 31*         Results from last 7 days   Lab Units 02/11/22  0633 02/10/22  1324   SODIUM mmol/L 142 140   POTASSIUM mmol/L 3 8 4 5   CHLORIDE mmol/L 108 107   CO2 mmol/L 25 21   ANION GAP mmol/L 9 12   BUN mg/dL 80* 84*   CREATININE mg/dL 3 07* 3 13*   EGFR ml/min/1 73sq m 15 15   CALCIUM mg/dL 8 3 8 1*     Results from last 7 days   Lab Units 02/10/22  1324   AST U/L 17   ALT U/L 19   ALK PHOS U/L 109   TOTAL PROTEIN g/dL 6 6   ALBUMIN g/dL 3 1*   TOTAL BILIRUBIN mg/dL 0 27     Results from last 7 days   Lab Units 02/11/22  1108 02/11/22  0802 02/10/22  2101 02/10/22  1720 02/10/22  1625   POC GLUCOSE mg/dl 206* 103 128 86 53*     Results from last 7 days   Lab Units 02/11/22  0633 02/10/22  1324   GLUCOSE RANDOM mg/dL 104 145*               Results from last 7 days   Lab Units 02/10/22  1817 02/10/22  1538 02/10/22  1324   HS TNI 0HR ng/L  --   --  186*   HS TNI 2HR ng/L  --  396*  --    HSTNI D2 ng/L  --  210*  --    HS TNI 4HR ng/L 418*  --   --    HSTNI D4 ng/L 232*  --   --            Results from last 7 days   Lab Units 02/10/22  1324   NT-PRO BNP pg/mL 2,094*         ED Treatment:   Medication Administration from 02/10/2022 1246 to 02/10/2022 2010       Date/Time Order Dose Route Action Comments     02/10/2022 1444 furosemide (LASIX) injection 80 mg 80 mg Intravenous Given      02/10/2022 1628 allopurinol (ZYLOPRIM) tablet 100 mg 100 mg Oral Given      02/10/2022 1619 aspirin (ECOTRIN LOW STRENGTH) EC tablet 81 mg 81 mg Oral Given      02/10/2022 1754 atorvastatin (LIPITOR) tablet 40 mg 40 mg Oral Given      02/10/2022 1619 citalopram (CeleXA) tablet 40 mg 40 mg Oral Given      02/10/2022 1619 clopidogrel (PLAVIX) tablet 75 mg 75 mg Oral Given      02/10/2022 1754 gabapentin (NEURONTIN) capsule 300 mg 300 mg Oral Given      02/10/2022 1628 insulin lispro (HumaLOG) 100 units/mL subcutaneous injection 15 Units 0 Units Subcutaneous Hold      02/10/2022 1619 isosorbide mononitrate (IMDUR) 24 hr tablet 60 mg 60 mg Oral Given      02/10/2022 1618 pantoprazole (PROTONIX) EC tablet 40 mg 40 mg Oral Given      02/10/2022 1750 furosemide (LASIX) injection 40 mg 40 mg Intravenous Given      02/10/2022 1620 heparin (porcine) subcutaneous injection 5,000 Units 5,000 Units Subcutaneous Given      02/10/2022 1628 insulin lispro (HumaLOG) 100 units/mL subcutaneous injection 2-12 Units 0 Units Subcutaneous Hold         Present on Admission:   CAD (coronary artery disease)   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 heart failure (HCC)   Acute renal failure superimposed on stage 4 chronic kidney disease ([facility])      Admitting Diagnosis: CHF (congestive heart failure) ([facility]) [I50 9]  Chest pain [R07 9]  Volume overload [E87 70]  Acute kidney injury superimposed on chronic kidney disease ([facility] [address] ) [N17 9, N18 9]  Age/Sex: 63 y o  female  Admission Orders:  Scheduled Medications:  allopurinol, 100 mg, Oral, Daily  aspirin, 81 mg, Oral, Daily  atorvastatin, 40 mg, Oral, After Dinner  citalopram, 40 mg, Oral, Daily  clopidogrel, 75 mg, Oral, Daily  furosemide, 40 mg, Intravenous, BID  gabapentin, 300 mg, Oral, BID  heparin (porcine), 5,000 Units, Subcutaneous, Q8H LIAM  insulin glargine, 50 Units, Subcutaneous, HS  insulin lispro, 15 Units, Subcutaneous, TID With Meals  insulin lispro, 2-12 Units, Subcutaneous, TID AC  isosorbide mononitrate, 60 mg, Oral, Daily  levothyroxine, 50 mcg, Oral, Daily  OLANZapine, 5 mg, Oral, HS  pantoprazole, 40 mg, Oral, BID      Continuous IV Infusions:     PRN Meds:  acetaminophen, 650 mg, Oral, Q4H PRN  HYDROcodone-acetaminophen, 1 tablet, Oral, Q8H PRN  tiZANidine, 2 mg, Oral, Q8H PRN        IP CONSULT TO CARDIOLOGY    Network Utilization Review Department  ATTENTION: Please call with any questions or concerns to [phone] and carefully listen to the prompts so that you are directed to the right person   All voicemails are confidential   Amelie Amin all requests for admission clinical reviews, approved or denied determinations and any other requests to dedicated fax number below belonging to the campus where the patient is receiving treatment   List of dedicated fax numbers for the Facilities:  [address] DENIALS (Administrative/Medical Necessity) [phone]   [address]  (Maternity/NICU/Pediatrics) [phone] [address] [place] [address]  [address] [facility] [place] [address] [place] [address] [place] [address] [address] [facility] [place] [address] [phone]

## 2022-02-11 NOTE — ASSESSMENT & PLAN NOTE
Lab Results   Component Value Date    HGBA1C 8 5 (A) 12/16/2021     · Continue Lantus 50 units at night with Humalog 15 units with meals and sliding scale  · Monitor Accu-Cheks, sliding scale for coverage

## 2022-02-11 NOTE — ASSESSMENT & PLAN NOTE
· Non MI troponin elevation likely secondary to CHF exacerbation and CKD stage 4  · Cardiology input appreciated

## 2022-02-11 NOTE — PLAN OF CARE
Problem: GENITOURINARY - ADULT  Goal: Maintains or returns to baseline urinary function  Description: INTERVENTIONS:  - Assess urinary function  - Encourage oral fluids to ensure adequate hydration if ordered  - Administer IV fluids as ordered to ensure adequate hydration  - Administer ordered medications as needed  - Offer frequent toileting  - Follow urinary retention protocol if ordered  Outcome: Progressing  Goal: Absence of urinary retention  Description: INTERVENTIONS:  - Assess patients ability to void and empty bladder  - Monitor I/O  - Bladder scan as needed  - Discuss with physician/AP medications to alleviate retention as needed  - Discuss catheterization for long term situations as appropriate  Outcome: Progressing  Goal: Urinary catheter remains patent  Description: INTERVENTIONS:  - Assess patency of urinary catheter  - If patient has a chronic morales, consider changing catheter if non-functioning  - Follow guidelines for intermittent irrigation of non-functioning urinary catheter  Outcome: Progressing none known

## 2022-02-12 DIAGNOSIS — F51.05 INSOMNIA SECONDARY TO ANXIETY: ICD-10-CM

## 2022-02-12 DIAGNOSIS — F41.9 INSOMNIA SECONDARY TO ANXIETY: ICD-10-CM

## 2022-02-12 LAB
ANION GAP SERPL CALCULATED.3IONS-SCNC: 10 MMOL/L (ref 4–13)
BASOPHILS # BLD AUTO: 0.03 THOUSANDS/ΜL (ref 0–0.1)
BASOPHILS NFR BLD AUTO: 0 % (ref 0–1)
BUN SERPL-MCNC: 82 MG/DL (ref 5–25)
CALCIUM SERPL-MCNC: 8.7 MG/DL (ref 8.3–10.1)
CHLORIDE SERPL-SCNC: 108 MMOL/L (ref 100–108)
CO2 SERPL-SCNC: 24 MMOL/L (ref 21–32)
CREAT SERPL-MCNC: 2.86 MG/DL (ref 0.6–1.3)
EOSINOPHIL # BLD AUTO: 0.32 THOUSAND/ΜL (ref 0–0.61)
EOSINOPHIL NFR BLD AUTO: 5 % (ref 0–6)
ERYTHROCYTE [DISTWIDTH] IN BLOOD BY AUTOMATED COUNT: 16.1 % (ref 11.6–15.1)
GFR SERPL CREATININE-BSD FRML MDRD: 17 ML/MIN/1.73SQ M
GLUCOSE SERPL-MCNC: 173 MG/DL (ref 65–140)
GLUCOSE SERPL-MCNC: 190 MG/DL (ref 65–140)
GLUCOSE SERPL-MCNC: 214 MG/DL (ref 65–140)
GLUCOSE SERPL-MCNC: 85 MG/DL (ref 65–140)
GLUCOSE SERPL-MCNC: 95 MG/DL (ref 65–140)
HCT VFR BLD AUTO: 24.6 % (ref 34.8–46.1)
HGB BLD-MCNC: 8 G/DL (ref 11.5–15.4)
IMM GRANULOCYTES # BLD AUTO: 0.03 THOUSAND/UL (ref 0–0.2)
IMM GRANULOCYTES NFR BLD AUTO: 0 % (ref 0–2)
LYMPHOCYTES # BLD AUTO: 1.79 THOUSANDS/ΜL (ref 0.6–4.47)
LYMPHOCYTES NFR BLD AUTO: 25 % (ref 14–44)
MAGNESIUM SERPL-MCNC: 1.9 MG/DL (ref 1.6–2.6)
MCH RBC QN AUTO: 32.9 PG (ref 26.8–34.3)
MCHC RBC AUTO-ENTMCNC: 32.5 G/DL (ref 31.4–37.4)
MCV RBC AUTO: 101 FL (ref 82–98)
MONOCYTES # BLD AUTO: 0.67 THOUSAND/ΜL (ref 0.17–1.22)
MONOCYTES NFR BLD AUTO: 9 % (ref 4–12)
NEUTROPHILS # BLD AUTO: 4.27 THOUSANDS/ΜL (ref 1.85–7.62)
NEUTS SEG NFR BLD AUTO: 61 % (ref 43–75)
NRBC BLD AUTO-RTO: 0 /100 WBCS
PLATELET # BLD AUTO: 192 THOUSANDS/UL (ref 149–390)
PMV BLD AUTO: 9.8 FL (ref 8.9–12.7)
POTASSIUM SERPL-SCNC: 3.8 MMOL/L (ref 3.5–5.3)
RBC # BLD AUTO: 2.43 MILLION/UL (ref 3.81–5.12)
SODIUM SERPL-SCNC: 142 MMOL/L (ref 136–145)
WBC # BLD AUTO: 7.11 THOUSAND/UL (ref 4.31–10.16)

## 2022-02-12 PROCEDURE — 99232 SBSQ HOSP IP/OBS MODERATE 35: CPT | Performed by: STUDENT IN AN ORGANIZED HEALTH CARE EDUCATION/TRAINING PROGRAM

## 2022-02-12 PROCEDURE — 99232 SBSQ HOSP IP/OBS MODERATE 35: CPT | Performed by: INTERNAL MEDICINE

## 2022-02-12 PROCEDURE — 83735 ASSAY OF MAGNESIUM: CPT | Performed by: NURSE PRACTITIONER

## 2022-02-12 PROCEDURE — 82948 REAGENT STRIP/BLOOD GLUCOSE: CPT

## 2022-02-12 PROCEDURE — 85025 COMPLETE CBC W/AUTO DIFF WBC: CPT | Performed by: INTERNAL MEDICINE

## 2022-02-12 PROCEDURE — 80048 BASIC METABOLIC PNL TOTAL CA: CPT | Performed by: INTERNAL MEDICINE

## 2022-02-12 RX ORDER — METOLAZONE 5 MG/1
5 TABLET ORAL ONCE
Status: COMPLETED | OUTPATIENT
Start: 2022-02-12 | End: 2022-02-12

## 2022-02-12 RX ORDER — FERROUS SULFATE 325(65) MG
325 TABLET ORAL
Status: DISCONTINUED | OUTPATIENT
Start: 2022-02-12 | End: 2022-02-16 | Stop reason: HOSPADM

## 2022-02-12 RX ORDER — POTASSIUM CHLORIDE 20 MEQ/1
20 TABLET, EXTENDED RELEASE ORAL ONCE
Status: COMPLETED | OUTPATIENT
Start: 2022-02-12 | End: 2022-02-12

## 2022-02-12 RX ORDER — ECHINACEA PURPUREA EXTRACT 125 MG
1 TABLET ORAL
Status: DISCONTINUED | OUTPATIENT
Start: 2022-02-12 | End: 2022-02-16 | Stop reason: HOSPADM

## 2022-02-12 RX ADMIN — CITALOPRAM HYDROBROMIDE 40 MG: 20 TABLET ORAL at 07:36

## 2022-02-12 RX ADMIN — FUROSEMIDE 40 MG: 10 INJECTION, SOLUTION INTRAMUSCULAR; INTRAVENOUS at 07:36

## 2022-02-12 RX ADMIN — HYDROCODONE BITARTRATE AND ACETAMINOPHEN 1 TABLET: 5; 325 TABLET ORAL at 15:40

## 2022-02-12 RX ADMIN — HEPARIN SODIUM 5000 UNITS: 5000 INJECTION INTRAVENOUS; SUBCUTANEOUS at 05:20

## 2022-02-12 RX ADMIN — ATORVASTATIN CALCIUM 40 MG: 40 TABLET, FILM COATED ORAL at 17:25

## 2022-02-12 RX ADMIN — OLANZAPINE 5 MG: 5 TABLET, FILM COATED ORAL at 22:04

## 2022-02-12 RX ADMIN — PANTOPRAZOLE SODIUM 40 MG: 40 TABLET, DELAYED RELEASE ORAL at 06:50

## 2022-02-12 RX ADMIN — POTASSIUM CHLORIDE 20 MEQ: 1500 TABLET, EXTENDED RELEASE ORAL at 10:53

## 2022-02-12 RX ADMIN — INSULIN GLARGINE 50 UNITS: 100 INJECTION, SOLUTION SUBCUTANEOUS at 22:04

## 2022-02-12 RX ADMIN — ASPIRIN 81 MG: 81 TABLET, COATED ORAL at 07:36

## 2022-02-12 RX ADMIN — PANTOPRAZOLE SODIUM 40 MG: 40 TABLET, DELAYED RELEASE ORAL at 15:40

## 2022-02-12 RX ADMIN — HEPARIN SODIUM 5000 UNITS: 5000 INJECTION INTRAVENOUS; SUBCUTANEOUS at 15:40

## 2022-02-12 RX ADMIN — FERROUS SULFATE TAB 325 MG (65 MG ELEMENTAL FE) 325 MG: 325 (65 FE) TAB at 17:25

## 2022-02-12 RX ADMIN — GABAPENTIN 300 MG: 300 CAPSULE ORAL at 17:25

## 2022-02-12 RX ADMIN — INSULIN LISPRO 2 UNITS: 100 INJECTION, SOLUTION INTRAVENOUS; SUBCUTANEOUS at 10:53

## 2022-02-12 RX ADMIN — INSULIN LISPRO 15 UNITS: 100 INJECTION, SOLUTION INTRAVENOUS; SUBCUTANEOUS at 15:43

## 2022-02-12 RX ADMIN — GABAPENTIN 300 MG: 300 CAPSULE ORAL at 07:36

## 2022-02-12 RX ADMIN — CLOPIDOGREL BISULFATE 75 MG: 75 TABLET ORAL at 07:36

## 2022-02-12 RX ADMIN — ALLOPURINOL 100 MG: 100 TABLET ORAL at 07:36

## 2022-02-12 RX ADMIN — HYDROCODONE BITARTRATE AND ACETAMINOPHEN 1 TABLET: 5; 325 TABLET ORAL at 05:21

## 2022-02-12 RX ADMIN — TIZANIDINE 2 MG: 4 TABLET ORAL at 05:20

## 2022-02-12 RX ADMIN — INSULIN LISPRO 2 UNITS: 100 INJECTION, SOLUTION INTRAVENOUS; SUBCUTANEOUS at 15:43

## 2022-02-12 RX ADMIN — ISOSORBIDE MONONITRATE 60 MG: 60 TABLET, EXTENDED RELEASE ORAL at 07:36

## 2022-02-12 RX ADMIN — INSULIN LISPRO 15 UNITS: 100 INJECTION, SOLUTION INTRAVENOUS; SUBCUTANEOUS at 11:57

## 2022-02-12 RX ADMIN — LEVOTHYROXINE SODIUM 50 MCG: 50 TABLET ORAL at 05:21

## 2022-02-12 RX ADMIN — FUROSEMIDE 40 MG: 10 INJECTION, SOLUTION INTRAMUSCULAR; INTRAVENOUS at 17:25

## 2022-02-12 RX ADMIN — METOLAZONE 5 MG: 5 TABLET ORAL at 11:57

## 2022-02-12 RX ADMIN — HEPARIN SODIUM 5000 UNITS: 5000 INJECTION INTRAVENOUS; SUBCUTANEOUS at 22:04

## 2022-02-12 NOTE — PROGRESS NOTES
Progress Note - Nephrology   Saulo Ross 63 y o  female MRN: [id number]  Unit/Bed#: E5 -01 Encounter: [id number]    61-year-old female with past history of progressive Chronic Kidney Disease IV, uncontrolled diabetes, systolic/diastolic CHF, hypertension who presented with worsening shortness of breath, leg edema and weight gain  Nephrology consulted for acute kidney injury on top of Chronic Kidney Disease    ASSESSMENT and PLAN:  Acute kidney injury (POA):  On top of Chronic Kidney Disease IV  Etiology ALFRED: Suspect cardiorenal in the setting of volume overload  Chronic Kidney Disease etiology:  Suspect diabetic kidney disease, cardiorenal hypertensive nephrosclerosis and morbid obesity  Assessment:   After review of medical records through [address] [place] [address] it appears that the patient has a baseline Creatinine of mid two range   Follows with Dr Roach as outpatient   Patient was admitted with a creatinine of 3 1   Patient's creatinine today is at 2 86   Currently on IV diuretics 40 mg b i d  per Cardiology   Acid base and lytes stable    Has Camargo catheter   Clinically, patient is not uremic and there is no acute indication for renal replacement therapy (dialysis)  Workup:   Urinalysis with micro reports:  Small blood, positive nitrates, trace leukocytes, plus two protein, 2-4 RBCs, 2-4 WBCs  Plan:   Avoid nephrotoxins, adjust meds to appropriate GFR   Optimize hemodynamic status to avoid delay in renal recovery   Continue with diuretic regimen per Cardiology   Continue with Camargo catheter   Need accurate I&O   Continue trend I/O, lab values volume status closely   Check labs in a m      Blood pressure/Hypertension:  Assessment and Plan:   Current blood pressure less fluctuation   Current medications include:  Lasix 40 mg IV 2 times daily, Imdur 60 mg daily   Maximize hemodynamics to maintain MAP >65   Avoid hypotension or fluctuations in blood pressure   Will continue to trend    Systolic and diastolic CHF exacerbation:  Assessment and Plan:   Cardiology following   Recent echocardiogram revealed EF of 45% with grade 2 diastolic dysfunction   Improving volume status   Remains on IV Lasix 40 mg b i d  per Cardiology   Standing scale and accurate I&O   Currently on room air    Nephrotic range proteinuria  Assessment and Plan:   Previous urine micro creatinine ratio was 5 2 in August 2021   Suspect etiology likely secondary to uncontrolled diabetes, morbid obesity related FSGS   Not on Ace/Arb as outpatient-continue to avoid given ALFRED   Will repeat in steady state as outpatient     Anemia of Chronic Kidney Disease  Assessment and Plan:  · Current hemoglobin 8 0  · Iron stores on 02/11/2022 reveals iron 41, ferritin 42, iron saturation 19, TIBC 260  · Will start ferrous sulfate one tab daily  · Continue trend  · For primary team      SUBJECTIVE:  Patient seen and examined at bedside  Overall feeling better  Having more energy    Denies chest pain or shortness of breath at rest    OBJECTIVE:  Current Weight: Weight - Scale: 135 kg (296 lb 15 4 oz)  Vitals:    02/12/22 0600 02/12/22 0731 02/12/22 0801 02/12/22 1446   BP:  150/57 138/59 150/55   Pulse:  62 61 68   Resp:    13   Temp:  98 3 °F (36 8 °C) 97 7 °F (36 5 °C) 97 9 °F (36 6 °C)   TempSrc:       SpO2:  94% 95% 92%   Weight: 135 kg (296 lb 15 4 oz)      Height:           Intake/Output Summary (Last 24 hours) at 2/12/2022 1624  Last data filed at 2/12/2022 1446  Gross per 24 hour   Intake 1079 5 ml   Output 1000 ml   Net 79 5 ml     General:  No acute distress, cooperative, out of bed, obese  Skin:  Warm and dry without rash  ENMT:  Mucous membranes moist, sclera anicteric  Respiratory:  Essentially clear on auscultation without crackles, rhonchi, wheezes, decreased bases  Cardiac:  Regular rate and rhythm without rub, or murmur, no noted JVD,, plus two bilateral lower extremity edema  GI:  Soft, nontender, no distention, active bowel sounds  Neuro:  Alert oriented and awake  Psych:  Appropriate affect  Muscle skeletal:  No obvious deformities noted    Medications:    Current Facility-Administered Medications:     acetaminophen (TYLENOL) tablet 650 mg, 650 mg, Oral, Q4H PRN, Matti Ledbetter PA-C    allopurinol (ZYLOPRIM) tablet 100 mg, 100 mg, Oral, Daily, Ottoniel Webster MD, 100 mg at 02/12/22 0736    aspirin (ECOTRIN LOW STRENGTH) EC tablet 81 mg, 81 mg, Oral, Daily, Sandi Saba MD, 81 mg at 02/12/22 0736    atorvastatin (LIPITOR) tablet 40 mg, 40 mg, Oral, After Obdulia Waters MD, 40 mg at 02/11/22 1655    citalopram (CeleXA) tablet 40 mg, 40 mg, Oral, Daily, Ottoniel Webster MD, 40 mg at 02/12/22 0736    clopidogrel (PLAVIX) tablet 75 mg, 75 mg, Oral, Daily, Ottoniel Webster MD, 75 mg at 02/12/22 0736    furosemide (LASIX) injection 40 mg, 40 mg, Intravenous, BID, Ottoniel Webster MD, 40 mg at 02/12/22 0736    gabapentin (NEURONTIN) capsule 300 mg, 300 mg, Oral, BID, Ottoniel Webster MD, 300 mg at 02/12/22 0736    heparin (porcine) subcutaneous injection 5,000 Units, 5,000 Units, Subcutaneous, Q8H [address], 5,000 Units at 02/12/22 1540 **AND** [COMPLETED] Platelet count, , , Once, Ottoniel Webster MD    HYDROcodone-acetaminophen [facility]) 5-325 mg per tablet 1 tablet, 1 tablet, Oral, Q8H PRN, Matti Ledbetter PA-C, 1 tablet at 02/12/22 1540    insulin glargine (LANTUS) subcutaneous injection 50 Units 0 5 mL, 50 Units, Subcutaneous, HS, Ottoniel Webster MD, 50 Units at 02/11/22 2118    insulin lispro (HumaLOG) 100 units/mL subcutaneous injection 15 Units, 15 Units, Subcutaneous, TID With Meals, Ottoniel Webster MD, 15 Units at 02/12/22 1543    insulin lispro (HumaLOG) 100 units/mL subcutaneous injection 2-12 Units, 2-12 Units, Subcutaneous, TID AC, 2 Units at 02/12/22 1543 **AND** Fingerstick Glucose (POCT), , , TID AC, Karyn Marley MD    isosorbide mononitrate (IMDUR) 24 hr tablet 60 mg, 60 mg, Oral, Daily, Karyn Marley MD, 60 mg at 02/12/22 0736    levothyroxine tablet 50 mcg, 50 mcg, Oral, Daily, Karyn Marley MD, 50 mcg at 02/12/22 0521    OLANZapine (ZyPREXA) tablet 5 mg, 5 mg, Oral, HS, Karyn Marley MD, 5 mg at 02/11/22 2117    pantoprazole (PROTONIX) EC tablet 40 mg, 40 mg, Oral, BID, Karyn Marley MD, 40 mg at 02/12/22 1540    sodium chloride (OCEAN) 0 65 % nasal spray 1 spray, 1 spray, Each Nare, Q1H PRN, Zayra Bangura MD    tiZANidine (ZANAFLEX) tablet 2 mg, 2 mg, Oral, Q8H PRN, Karyn Marley MD, 2 mg at 02/12/22 6341    Laboratory Results:  Results from last 7 days   Lab Units 02/12/22  0625 02/11/22  0633 02/10/22  1817 02/10/22  1324   WBC Thousand/uL 7 11 7 79  --  11 06*   HEMOGLOBIN g/dL 8 0* 7 6*  --  9 1*   HEMATOCRIT % 24 6* 23 6*  --  28 1*   PLATELETS Thousands/uL 192 186 219 227   SODIUM mmol/L 142 142  --  140   POTASSIUM mmol/L 3 8 3 8  --  4 5   CHLORIDE mmol/L 108 108  --  107   CO2 mmol/L 24 25  --  21   BUN mg/dL 82* 80*  --  84*   CREATININE mg/dL 2 86* 3 07*  --  3 13*   CALCIUM mg/dL 8 7 8 3  --  8 1*   MAGNESIUM mg/dL 1 9  --   --   --

## 2022-02-12 NOTE — PROGRESS NOTES
[facility]  Progress Note - Chu Avelar 1960, 63 y o  female MRN: [id number]  Unit/Bed#: E5 -01 Encounter: [id number]  Primary Care Provider: CHERELLE Armstrong   Date and time admitted to hospital: 2/10/2022 12:46 PM    * 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 heart failure ([facility] )  Assessment & Plan  Wt Readings from Last 3 Encounters:   02/12/22 135 kg (296 lb 15 4 oz)   02/10/22 (!) 137 kg (302 lb)   12/16/21 125 kg (276 lb 4 8 oz)     This is a 61-year-old female with history of CKD stage 4, depression, diabetes mellitus, hypertension, hyperlipidemia, CHF presented with progressive worsening dyspnea and lower extremity edema for the past 2 weeks  · NT proBNP 2094, chest x-ray with trace left pleural effusion, significant lower extremity edema  · Cardiology follow-up appreciated  · Continue with furosemide IV 40 mg b i d   · Continue aspirin, Plavix, statin, Imdur   · Weight unchanged  · Cardiology to dose metolazone today  · Monitor I and O, daily weight, fluid restriction  · Maintained on low-dose torsemide 20 mg daily prior to admission  Elevated troponin  Assessment & Plan  · Non MI troponin elevation likely secondary to CHF exacerbation and CKD stage 4  · Cardiology input appreciated    Acute renal failure superimposed on stage 4 chronic kidney disease ([facility] )  Assessment & Plan  · Acute renal failure on CKD stage 4  · Baseline creatinine in the mid 2s  · Creatinine 3 1 on admission  · Acute kidney injury likely secondary to cardiorenal and volume overload  · Nephrology consultation appreciated  · Urinalysis, bladder scan  · Continue with IV diuretics  · Closely monitor renal function    CAD (coronary artery disease)  Assessment & Plan  · History of multiple stents to the LAD and recent stenting to the RCA and August 2021    · Continue dual anti-platelet with aspirin and Plavix, statin, Imdur or  · Hold Coreg due to bradycardia and compensated CHF  Type 2 diabetes mellitus with stage 4 chronic kidney disease, with long-term current use of insulin (HCC)  Assessment & Plan  Lab Results   Component Value Date    HGBA1C 8 5 (A) 2021     · Continue Lantus 50 units at night with Humalog 15 units with meals and sliding scale  · Monitor Accu-Cheks, sliding scale for coverage          VTE Pharmacologic Prophylaxis:   Pharmacologic: heparin    Patient Centered Rounds: I have performed bedside rounds with nursing staff today  Education and Discussions with Family / Patient: patient    Time Spent for Care: 20 minutes  More than 50% of total time spent on counseling and coordination of care as described above  Current Length of Stay: 2 day(s)    Current Patient Status: Inpatient   Certification Statement: The patient will continue to require additional inpatient hospital stay due to IV diuretics    Discharge Plan / Estimated Discharge Date: TBD    Code Status: Level 1 - Full Code      Subjective:   Patient seen and examined at bedside, denies any chest pain, denies any dyspnea    Objective:     Vitals:   Temp (24hrs), Av °F (36 7 °C), Min:97 7 °F (36 5 °C), Max:98 3 °F (36 8 °C)    Temp:  [97 7 °F (36 5 °C)-98 3 °F (36 8 °C)] 97 9 °F (36 6 °C)  HR:  [61-69] 68  Resp:  [13-18] 13  BP: (138-165)/(55-63) 150/55  SpO2:  [91 %-95 %] 92 %  Body mass index is 45 15 kg/m²  Input and Output Summary (last 24 hours): Intake/Output Summary (Last 24 hours) at 2022 1527  Last data filed at 2022 0801  Gross per 24 hour   Intake 833 ml   Output 1000 ml   Net -167 ml       Physical Exam:    Constitutional: Patient is oriented to person, place and time, no acute distress  HEENT:  Normocephalic, atraumatic  Cardiovascular: Normal S1S2, RRR, No murmurs/rubs/gallops appreciated    Pulmonary:  Bilateral air entry, No rhonchi/rales/wheezing appreciated  Abdominal: Soft, Bowel sounds present, Non-tender, Non-distended  Extremities:  Lower extremity edema  Neurological: Cranial nerves II-XII grossly intact, sensation intact, otherwise no focal neurological symptoms  Skin:  Warm, dry    Additional Data:     Labs:    Results from last 7 days   Lab Units 02/12/22  0625   WBC Thousand/uL 7 11   HEMOGLOBIN g/dL 8 0*   HEMATOCRIT % 24 6*   PLATELETS Thousands/uL 192   NEUTROS PCT % 61   LYMPHS PCT % 25   MONOS PCT % 9   EOS PCT % 5     Results from last 7 days   Lab Units 02/12/22  0625 02/11/22  0633 02/10/22  1324   POTASSIUM mmol/L 3 8   < > 4 5   CHLORIDE mmol/L 108   < > 107   CO2 mmol/L 24   < > 21   BUN mg/dL 82*   < > 84*   CREATININE mg/dL 2 86*   < > 3 13*   CALCIUM mg/dL 8 7   < > 8 1*   ALK PHOS U/L  --   --  109   ALT U/L  --   --  19   AST U/L  --   --  17    < > = values in this interval not displayed  I Have Reviewed All Lab Data Listed Above          Recent Cultures (last 7 days):           Last 24 Hours Medication List:   Current Facility-Administered Medications   Medication Dose Route Frequency Provider Last Rate    acetaminophen  650 mg Oral Q4H PRN Mat Collins PA-C      allopurinol  100 mg Oral Daily Evan Zelaya MD      aspirin  81 mg Oral Daily Evan Zelaya MD      atorvastatin  40 mg Oral After Socrates Officer, MD      citalopram  40 mg Oral Daily Evan Zelaya MD      clopidogrel  75 mg Oral Daily Evan Zelaya MD      furosemide  40 mg Intravenous BID Evan Zelaya MD      gabapentin  300 mg Oral BID Evan Zelaya MD      heparin (porcine)  5,000 Units Subcutaneous [facility] Evan Zelaya MD      HYDROcodone-acetaminophen  1 tablet Oral Q8H PRN Mat Collins PA-C      insulin glargine  50 Units Subcutaneous HS Evan Zelaya MD      insulin lispro  15 Units Subcutaneous TID With Meals Evan Zelaya MD      insulin lispro  2-12 Units Subcutaneous TID [facility] Aleksey La MD      isosorbide mononitrate  60 mg Oral Daily Aleksey La MD      levothyroxine  50 mcg Oral Daily Aleksey La MD      OLANZapine  5 mg Oral HS Aleksey La MD      pantoprazole  40 mg Oral BID Aleksey La MD      sodium chloride  1 spray Each Nare Q1H PRN Tahmina Howard MD      tiZANidine  2 mg Oral Q8H PRN Aleksey La MD          Today, Patient Was Seen By: Tahmina Howard MD

## 2022-02-12 NOTE — PLAN OF CARE
Problem: GENITOURINARY - ADULT  Goal: Maintains or returns to baseline urinary function  Description: INTERVENTIONS:  - Assess urinary function  - Encourage oral fluids to ensure adequate hydration if ordered  - Administer IV fluids as ordered to ensure adequate hydration  - Administer ordered medications as needed  - Offer frequent toileting  - Follow urinary retention protocol if ordered  Outcome: Progressing  Goal: Absence of urinary retention  Description: INTERVENTIONS:  - Assess patients ability to void and empty bladder  - Monitor I/O  - Bladder scan as needed  - Discuss with physician/AP medications to alleviate retention as needed  - Discuss catheterization for long term situations as appropriate  Outcome: Progressing  Goal: Urinary catheter remains patent  Description: INTERVENTIONS:  - Assess patency of urinary catheter  - If patient has a chronic morales, consider changing catheter if non-functioning  - Follow guidelines for intermittent irrigation of non-functioning urinary catheter  Outcome: Progressing     Problem: METABOLIC, FLUID AND ELECTROLYTES - ADULT  Goal: Electrolytes maintained within normal limits  Description: INTERVENTIONS:  - Monitor labs and assess patient for signs and symptoms of electrolyte imbalances  - Administer electrolyte replacement as ordered  - Monitor response to electrolyte replacements, including repeat lab results as appropriate  - Instruct patient on fluid and nutrition as appropriate  Outcome: Progressing  Goal: Fluid balance maintained  Description: INTERVENTIONS:  - Monitor labs   - Monitor I/O and WT  - Instruct patient on fluid and nutrition as appropriate  - Assess for signs & symptoms of volume excess or deficit  Outcome: Progressing  Goal: Glucose maintained within target range  Description: INTERVENTIONS:  - Monitor Blood Glucose as ordered  - Assess for signs and symptoms of hyperglycemia and hypoglycemia  - Administer ordered medications to maintain glucose within target range  - Assess nutritional intake and initiate nutrition service referral as needed  Outcome: Progressing         Problem: MUSCULOSKELETAL - ADULT  Goal: Maintain or return mobility to safest level of function  Description: INTERVENTIONS:  - Assess patient's ability to carry out ADLs; assess patient's baseline for ADL function and identify physical deficits which impact ability to perform ADLs (bathing, care of mouth/teeth, toileting, grooming, dressing, etc )  - Assess/evaluate cause of self-care deficits   - Assess range of motion  - Assess patient's mobility  - Assess patient's need for assistive devices and provide as appropriate  - Encourage maximum independence but intervene and supervise when necessary  - Involve family in performance of ADLs  - Assess for home care needs following discharge   - Consider OT consult to assist with ADL evaluation and planning for discharge  - Provide patient education as appropriate  Outcome: Progressing  Goal: Maintain proper alignment of affected body part  Description: INTERVENTIONS:  - Support, maintain and protect limb and body alignment  - Provide patient/ family with appropriate education  Outcome: Progressing     Problem: Potential for Falls  Goal: Patient will remain free of falls  Description: INTERVENTIONS:  - Educate patient/family on patient safety including physical limitations  - Instruct patient to call for assistance with activity   - Consult OT/PT to assist with strengthening/mobility   - Keep Call bell within reach  - Keep bed low and locked with side rails adjusted as appropriate  - Keep care items and personal belongings within reach  - Initiate and maintain comfort rounds  - Make Fall Risk Sign visible to staff  - Apply yellow socks and bracelet for high fall risk patients  - Consider moving patient to room near nurses station  Outcome: Progressing

## 2022-02-12 NOTE — PLAN OF CARE
Problem: GENITOURINARY - ADULT  Goal: Maintains or returns to baseline urinary function  Description: INTERVENTIONS:  - Assess urinary function  - Encourage oral fluids to ensure adequate hydration if ordered  - Administer IV fluids as ordered to ensure adequate hydration  - Administer ordered medications as needed  - Offer frequent toileting  - Follow urinary retention protocol if ordered  Outcome: Progressing  Goal: Absence of urinary retention  Description: INTERVENTIONS:  - Assess patients ability to void and empty bladder  - Monitor I/O  - Bladder scan as needed  - Discuss with physician/AP medications to alleviate retention as needed  - Discuss catheterization for long term situations as appropriate  Outcome: Progressing  Goal: Urinary catheter remains patent  Description: INTERVENTIONS:  - Assess patency of urinary catheter  - If patient has a chronic morales, consider changing catheter if non-functioning  - Follow guidelines for intermittent irrigation of non-functioning urinary catheter  Outcome: Progressing

## 2022-02-12 NOTE — PROGRESS NOTES
Progress Note - Cardiology   Jeanne Brooke 63 y o  female MRN: [id number]  Unit/Bed#: E5 -01 Encounter: [id number]      Asessment:  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 heart failure              - LVEF 45%, regional wall motion abnormalities, severe hypokinesis of the basal-mid anteroseptal and basal-mid inferoseptal walls, moderate hypokinesis of the basal-mid inferior walls, wall thickness mildly increased, mild concentric hypertrophy, grade 2 diastolic dysfunction, dyssynergic ventricular septal motion consistent with LBBB, mild progressive mitral stenosis (mean gradient 5 mmHg at 73 bpm), mild TR, trace WA, trace pericardial effusion, 8/24/21               - OP diuretic Rx, torsemide 20 mg daily  - most recent office weight 132 kg on 9/15/21  Non MI troponin elevation  Acute renal failure on CKD stage 4  Acute anemia on anemia of chronic disease  Coronary artery disease              - S/P PCI x 5 with prior stents to LAD and the circumflex artery  - most recent cardiac catheterization 8/31/21: Proximal LAD discrete 30% stenosis at site of prior stent, proximal % stenosis- chronic total lesion               - OP Rx, isosorbide mononitrate 60 mg daily, carvedilol 25 mg BID, Plavix 75 mg daily, aspirin 81 mg daily  Hypertension  Dyslipidemia              - OP Rx, atorvastatin 40 mg daily  - most recent lipid panel 8/3/21: Cholesterol 152, triglycerides 180, HDL 30, LDL 86       Other:  - neurogenic bladder status post suprapubic catheter  - obesity   - type 2 diabetes mellitus  - chronic narcotic dependence  - depression    Plan/ Discussion:  · Weight appears unchanged, intake/output not accurately documented  Renal function improving today to 2 86 mg/dL  Will continue furosemide 40 mg IV BID and monitor response  · Continue aspirin 81 mg daily, Plavix 75 mg daily, atorvastatin 40 mg daily, isosorbide mononitrate 60 mg daily  Beta-blocker remains on hold for now  · Monitor volume status with strict intake/output, daily weight  · Monitor renal function and electrolytes closely; maintain potassium > 4, magnesium > 2  Most recent potassium 3 8, will provide K-Dur 20 mEq today  Will add a magnesium level to a m  Labs  Subjective:  Patient resting in bed comfortably  Patient is without acute shortness of breath or chest pain  She feels as though her bilateral lower extremity edema is improving      Vitals:  Vitals:    02/11/22 0600 02/12/22 0600   Weight: 135 kg (296 lb 11 8 oz) 135 kg (296 lb 15 4 oz)   ,  Vitals:    02/11/22 2326 02/12/22 0600 02/12/22 0731 02/12/22 0801   BP: 165/63  150/57 138/59   Pulse: 69  62 61   Resp: 18      Temp: 98 °F (36 7 °C)  98 3 °F (36 8 °C) 97 7 °F (36 5 °C)   TempSrc:       SpO2: 93%  94% 95%   Weight:  135 kg (296 lb 15 4 oz)     Height:           Exam:  General: Alert awake and oriented, no acute distress  Heart:  Regular rate and rhythm, S1, S2  Respiratory effort/ Lungs:  Breathing comfortably on room air, rales noted bilateral bases  Abdominal: Obese, rounded, nontender  Lower Limbs: Bilateral lower extremity edema improving, left > right            Telemetry:       NSR with LBBB    Medications:    Current Facility-Administered Medications:     acetaminophen (TYLENOL) tablet 650 mg, 650 mg, Oral, Q4H PRN, Liberty Thayer PA-C    allopurinol (ZYLOPRIM) tablet 100 mg, 100 mg, Oral, Daily, Alem Grant MD, 100 mg at 02/12/22 0736    aspirin (ECOTRIN LOW STRENGTH) EC tablet 81 mg, 81 mg, Oral, Daily, Alem Grant MD, 81 mg at 02/12/22 0736    atorvastatin (LIPITOR) tablet 40 mg, 40 mg, Oral, After Adriana Rubio MD, 40 mg at 02/11/22 1655    citalopram (CeleXA) tablet 40 mg, 40 mg, Oral, Daily, Alem Grant MD, 40 mg at 02/12/22 0736    clopidogrel (PLAVIX) tablet 75 mg, 75 mg, Oral, Daily, Alem Grant MD, 75 mg at 02/12/22 0736    furosemide (LASIX) injection 40 mg, 40 mg, Intravenous, BID, Kellie Walters MD, 40 mg at 02/12/22 0736    gabapentin (NEURONTIN) capsule 300 mg, 300 mg, Oral, BID, Kellie Walters MD, 300 mg at 02/12/22 0736    heparin (porcine) subcutaneous injection 5,000 Units, 5,000 Units, Subcutaneous, Q8H [address], 5,000 Units at 02/12/22 0520 **AND** [COMPLETED] Platelet count, , , Once, Kellie Walters MD    HYDROcodone-acetaminophen [facility]) 5-325 mg per tablet 1 tablet, 1 tablet, Oral, Q8H PRN, Yennifer Ha PA-C, 1 tablet at 02/12/22 0521    insulin glargine (LANTUS) subcutaneous injection 50 Units 0 5 mL, 50 Units, Subcutaneous, HS, Kellie Walters MD, 50 Units at 02/11/22 2118    insulin lispro (HumaLOG) 100 units/mL subcutaneous injection 15 Units, 15 Units, Subcutaneous, TID With Meals, Kellie Walters MD, 15 Units at 02/11/22 1654    insulin lispro (HumaLOG) 100 units/mL subcutaneous injection 2-12 Units, 2-12 Units, Subcutaneous, TID AC, 2 Units at 02/11/22 1654 **AND** Fingerstick Glucose (POCT), , , TID AC, Kellie Walters MD    isosorbide mononitrate (IMDUR) 24 hr tablet 60 mg, 60 mg, Oral, Daily, Kellie Walters MD, 60 mg at 02/12/22 0736    levothyroxine tablet 50 mcg, 50 mcg, Oral, Daily, Kellie Walters MD, 50 mcg at 02/12/22 0521    OLANZapine (ZyPREXA) tablet 5 mg, 5 mg, Oral, HS, Kellie Walters MD, 5 mg at 02/11/22 2117    pantoprazole (PROTONIX) EC tablet 40 mg, 40 mg, Oral, BID, Kellie Walters MD, 40 mg at 02/12/22 0650    tiZANidine (ZANAFLEX) tablet 2 mg, 2 mg, Oral, Q8H PRN, Kellie Walters MD, 2 mg at 02/12/22 0520      Labs/Data:        Results from last 7 days   Lab Units 02/12/22  0625 02/11/22  0633 02/10/22  1817 02/10/22  1324 02/10/22  1324   WBC Thousand/uL 7 11 7 79  --   --  11 06*   HEMOGLOBIN g/dL 8 0* 7 6*  --   -- 9 1*   HEMATOCRIT % 24 6* 23 6*  --   --  28 1*   PLATELETS Thousands/uL 192 186 219   < > 227    < > = values in this interval not displayed       Results from last 7 days   Lab Units 02/12/22  0625 02/11/22  0633 02/10/22  1324   POTASSIUM mmol/L 3 8 3 8 4 5   CHLORIDE mmol/L 108 108 107   CO2 mmol/L 24 25 21   BUN mg/dL 82* 80* 84*

## 2022-02-12 NOTE — ASSESSMENT & PLAN NOTE
Wt Readings from Last 3 Encounters:   02/12/22 135 kg (296 lb 15 4 oz)   02/10/22 (!) 137 kg (302 lb)   12/16/21 125 kg (276 lb 4 8 oz)     This is a 73-year-old female with history of CKD stage 4, depression, diabetes mellitus, hypertension, hyperlipidemia, CHF presented with progressive worsening dyspnea and lower extremity edema for the past 2 weeks  · NT proBNP 2094, chest x-ray with trace left pleural effusion, significant lower extremity edema  · Cardiology follow-up appreciated  · Continue with furosemide IV 40 mg b i d   · Continue aspirin, Plavix, statin, Imdur   · Weight unchanged  · Cardiology to dose metolazone today  · Monitor I and O, daily weight, fluid restriction  · Maintained on low-dose torsemide 20 mg daily prior to admission

## 2022-02-13 LAB
ANION GAP SERPL CALCULATED.3IONS-SCNC: 10 MMOL/L (ref 4–13)
BASOPHILS # BLD AUTO: 0.04 THOUSANDS/ΜL (ref 0–0.1)
BASOPHILS NFR BLD AUTO: 1 % (ref 0–1)
BUN SERPL-MCNC: 90 MG/DL (ref 5–25)
CALCIUM SERPL-MCNC: 8.5 MG/DL (ref 8.3–10.1)
CHLORIDE SERPL-SCNC: 106 MMOL/L (ref 100–108)
CO2 SERPL-SCNC: 26 MMOL/L (ref 21–32)
CREAT SERPL-MCNC: 2.93 MG/DL (ref 0.6–1.3)
EOSINOPHIL # BLD AUTO: 0.33 THOUSAND/ΜL (ref 0–0.61)
EOSINOPHIL NFR BLD AUTO: 5 % (ref 0–6)
ERYTHROCYTE [DISTWIDTH] IN BLOOD BY AUTOMATED COUNT: 15.9 % (ref 11.6–15.1)
GFR SERPL CREATININE-BSD FRML MDRD: 16 ML/MIN/1.73SQ M
GLUCOSE SERPL-MCNC: 119 MG/DL (ref 65–140)
GLUCOSE SERPL-MCNC: 119 MG/DL (ref 65–140)
GLUCOSE SERPL-MCNC: 206 MG/DL (ref 65–140)
GLUCOSE SERPL-MCNC: 214 MG/DL (ref 65–140)
GLUCOSE SERPL-MCNC: 265 MG/DL (ref 65–140)
HCT VFR BLD AUTO: 25.5 % (ref 34.8–46.1)
HGB BLD-MCNC: 8.2 G/DL (ref 11.5–15.4)
IMM GRANULOCYTES # BLD AUTO: 0.02 THOUSAND/UL (ref 0–0.2)
IMM GRANULOCYTES NFR BLD AUTO: 0 % (ref 0–2)
LYMPHOCYTES # BLD AUTO: 1.55 THOUSANDS/ΜL (ref 0.6–4.47)
LYMPHOCYTES NFR BLD AUTO: 22 % (ref 14–44)
MCH RBC QN AUTO: 31.5 PG (ref 26.8–34.3)
MCHC RBC AUTO-ENTMCNC: 32.2 G/DL (ref 31.4–37.4)
MCV RBC AUTO: 98 FL (ref 82–98)
MONOCYTES # BLD AUTO: 0.65 THOUSAND/ΜL (ref 0.17–1.22)
MONOCYTES NFR BLD AUTO: 9 % (ref 4–12)
NEUTROPHILS # BLD AUTO: 4.45 THOUSANDS/ΜL (ref 1.85–7.62)
NEUTS SEG NFR BLD AUTO: 63 % (ref 43–75)
NRBC BLD AUTO-RTO: 0 /100 WBCS
PLATELET # BLD AUTO: 204 THOUSANDS/UL (ref 149–390)
PMV BLD AUTO: 9.6 FL (ref 8.9–12.7)
POTASSIUM SERPL-SCNC: 3.9 MMOL/L (ref 3.5–5.3)
RBC # BLD AUTO: 2.6 MILLION/UL (ref 3.81–5.12)
SODIUM SERPL-SCNC: 142 MMOL/L (ref 136–145)
WBC # BLD AUTO: 7.04 THOUSAND/UL (ref 4.31–10.16)

## 2022-02-13 PROCEDURE — 99232 SBSQ HOSP IP/OBS MODERATE 35: CPT | Performed by: INTERNAL MEDICINE

## 2022-02-13 PROCEDURE — 80048 BASIC METABOLIC PNL TOTAL CA: CPT | Performed by: INTERNAL MEDICINE

## 2022-02-13 PROCEDURE — 82948 REAGENT STRIP/BLOOD GLUCOSE: CPT

## 2022-02-13 PROCEDURE — 85025 COMPLETE CBC W/AUTO DIFF WBC: CPT | Performed by: INTERNAL MEDICINE

## 2022-02-13 RX ORDER — METOLAZONE 5 MG/1
5 TABLET ORAL ONCE
Status: COMPLETED | OUTPATIENT
Start: 2022-02-13 | End: 2022-02-13

## 2022-02-13 RX ORDER — FUROSEMIDE 10 MG/ML
60 INJECTION INTRAMUSCULAR; INTRAVENOUS
Status: DISCONTINUED | OUTPATIENT
Start: 2022-02-13 | End: 2022-02-15

## 2022-02-13 RX ADMIN — HYDROCODONE BITARTRATE AND ACETAMINOPHEN 1 TABLET: 5; 325 TABLET ORAL at 09:16

## 2022-02-13 RX ADMIN — INSULIN LISPRO 15 UNITS: 100 INJECTION, SOLUTION INTRAVENOUS; SUBCUTANEOUS at 11:15

## 2022-02-13 RX ADMIN — ASPIRIN 81 MG: 81 TABLET, COATED ORAL at 09:17

## 2022-02-13 RX ADMIN — GABAPENTIN 300 MG: 300 CAPSULE ORAL at 09:17

## 2022-02-13 RX ADMIN — HEPARIN SODIUM 5000 UNITS: 5000 INJECTION INTRAVENOUS; SUBCUTANEOUS at 05:23

## 2022-02-13 RX ADMIN — LEVOTHYROXINE SODIUM 50 MCG: 50 TABLET ORAL at 05:20

## 2022-02-13 RX ADMIN — HEPARIN SODIUM 5000 UNITS: 5000 INJECTION INTRAVENOUS; SUBCUTANEOUS at 15:54

## 2022-02-13 RX ADMIN — FUROSEMIDE 40 MG: 10 INJECTION, SOLUTION INTRAMUSCULAR; INTRAVENOUS at 09:18

## 2022-02-13 RX ADMIN — OLANZAPINE 5 MG: 5 TABLET, FILM COATED ORAL at 22:55

## 2022-02-13 RX ADMIN — ISOSORBIDE MONONITRATE 60 MG: 60 TABLET, EXTENDED RELEASE ORAL at 05:20

## 2022-02-13 RX ADMIN — ATORVASTATIN CALCIUM 40 MG: 40 TABLET, FILM COATED ORAL at 17:39

## 2022-02-13 RX ADMIN — ALLOPURINOL 100 MG: 100 TABLET ORAL at 09:17

## 2022-02-13 RX ADMIN — HEPARIN SODIUM 5000 UNITS: 5000 INJECTION INTRAVENOUS; SUBCUTANEOUS at 22:54

## 2022-02-13 RX ADMIN — INSULIN LISPRO 15 UNITS: 100 INJECTION, SOLUTION INTRAVENOUS; SUBCUTANEOUS at 15:54

## 2022-02-13 RX ADMIN — FUROSEMIDE 60 MG: 10 INJECTION, SOLUTION INTRAMUSCULAR; INTRAVENOUS at 15:54

## 2022-02-13 RX ADMIN — METOLAZONE 5 MG: 5 TABLET ORAL at 11:56

## 2022-02-13 RX ADMIN — INSULIN LISPRO 4 UNITS: 100 INJECTION, SOLUTION INTRAVENOUS; SUBCUTANEOUS at 11:14

## 2022-02-13 RX ADMIN — PANTOPRAZOLE SODIUM 40 MG: 40 TABLET, DELAYED RELEASE ORAL at 05:20

## 2022-02-13 RX ADMIN — CLOPIDOGREL BISULFATE 75 MG: 75 TABLET ORAL at 09:17

## 2022-02-13 RX ADMIN — INSULIN LISPRO 4 UNITS: 100 INJECTION, SOLUTION INTRAVENOUS; SUBCUTANEOUS at 15:54

## 2022-02-13 RX ADMIN — GABAPENTIN 300 MG: 300 CAPSULE ORAL at 17:39

## 2022-02-13 RX ADMIN — CITALOPRAM HYDROBROMIDE 40 MG: 20 TABLET ORAL at 09:17

## 2022-02-13 RX ADMIN — SALINE NASAL SPRAY 1 SPRAY: 1.5 SOLUTION NASAL at 09:21

## 2022-02-13 RX ADMIN — FERROUS SULFATE TAB 325 MG (65 MG ELEMENTAL FE) 325 MG: 325 (65 FE) TAB at 09:17

## 2022-02-13 RX ADMIN — PANTOPRAZOLE SODIUM 40 MG: 40 TABLET, DELAYED RELEASE ORAL at 15:54

## 2022-02-13 RX ADMIN — INSULIN GLARGINE 50 UNITS: 100 INJECTION, SOLUTION SUBCUTANEOUS at 22:54

## 2022-02-13 NOTE — PROGRESS NOTES
[facility] [facility]  Progress Note - [place] Noreen 1960, 63 y o  female MRN: [id number]  Unit/Bed#: E5 -01 Encounter: [id number]  Primary Care Provider: CHERELLE Chun   Date and time admitted to hospital: 2/10/2022 12:46 PM    * 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 heart failure ([address] )  Assessment & Plan  Wt Readings from Last 3 Encounters:   02/13/22 135 kg (297 lb 9 9 oz)   02/10/22 (!) 137 kg (302 lb)   12/16/21 125 kg (276 lb 4 8 oz)     This is a 65-year-old female with history of CKD stage 4, depression, diabetes mellitus, hypertension, hyperlipidemia, CHF presented with progressive worsening dyspnea and lower extremity edema for the past 2 weeks  · NT proBNP 2094, chest x-ray with trace left pleural effusion, significant lower extremity edema  · Cardiology follow-up appreciated  · Continue with furosemide IV 40 mg b i d   · Continue aspirin, Plavix, statin, Imdur   · Weight unchanged  · Cardiology to re-dose metolazone today  · Monitor I and O, daily weight, fluid restriction  · Maintained on low-dose torsemide 20 mg daily prior to admission  Elevated troponin  Assessment & Plan  · Non MI troponin elevation likely secondary to CHF exacerbation and CKD stage 4  · Cardiology input appreciated    Volume overload  Assessment & Plan  · 20 lb weight gain, dyspnea, vascular congestion on imaging and peripheral edema secondary to both congestive heart failure and renal failure    · See plan for CHF/acute kidney injury below    Acute renal failure superimposed on stage 4 chronic kidney disease (HCC)  Assessment & Plan  · Acute renal failure on CKD stage 4  · Baseline creatinine in the mid 2s  · Creatinine 3 1 on admission, improved to 2 96  · Acute kidney injury likely secondary to cardiorenal and volume overload  · Nephrology consultation appreciated  · Urinalysis, bladder scan  · Continue with IV diuretics  · Closely monitor renal function    CAD (coronary artery disease)  Assessment & Plan  · History of multiple stents to the LAD and recent stenting to the RCA and 2021  · Continue dual anti-platelet with aspirin and Plavix, statin, Imdur or  · Hold Coreg due to bradycardia and compensated CHF  Type 2 diabetes mellitus with stage 4 chronic kidney disease, with long-term current use of insulin (HCC)  Assessment & Plan  Lab Results   Component Value Date    HGBA1C 8 5 (A) 2021     · Continue Lantus 50 units at night with Humalog 15 units with meals and sliding scale  · Monitor Accu-Cheks, sliding scale for coverage        VTE Pharmacologic Prophylaxis:   Pharmacologic: heparin    Patient Centered Rounds: I have performed bedside rounds with nursing staff today  Education and Discussions with Family / Patient:  Patient    Time Spent for Care: 20 minutes  More than 50% of total time spent on counseling and coordination of care as described above  Current Length of Stay: 3 day(s)    Current Patient Status: Inpatient   Certification Statement: The patient will continue to require additional inpatient hospital stay due to IV diuresis    Discharge Plan / Estimated Discharge Date: TBD    Code Status: Level 1 - Full Code      Subjective:   Patient seen and examined at bedside, sitting upright, denies any chest pain, dyspnea    Objective:     Vitals:   Temp (24hrs), Av 9 °F (36 6 °C), Min:97 8 °F (36 6 °C), Max:98 °F (36 7 °C)    Temp:  [97 8 °F (36 6 °C)-98 °F (36 7 °C)] 97 8 °F (36 6 °C)  HR:  [63-69] 63  Resp:  [13-18] 18  BP: (142-164)/(55-69) 142/67  SpO2:  [90 %-97 %] 97 %  Body mass index is 45 25 kg/m²  Input and Output Summary (last 24 hours):        Intake/Output Summary (Last 24 hours) at 2022 1401  Last data filed at 2022 0201  Gross per 24 hour   Intake 837 7 ml   Output 2250 ml   Net -1412 3 ml       Physical Exam:    Constitutional: Patient is oriented to person, place and time, no acute distress  HEENT: Normocephalic, atraumatic  Cardiovascular: Normal S1S2, RRR, No murmurs/rubs/gallops appreciated  Pulmonary:  Bilateral air entry, No rhonchi/rales/wheezing appreciated  Abdominal: Soft, Bowel sounds present, Non-tender, Non-distended  Extremities:  Bilateral lower extremity edema  Neurological: Cranial nerves II-XII grossly intact, sensation intact, otherwise no focal neurological symptoms  Skin:  Warm, dry    Additional Data:     Labs:    Results from last 7 days   Lab Units 02/13/22  0537   WBC Thousand/uL 7 04   HEMOGLOBIN g/dL 8 2*   HEMATOCRIT % 25 5*   PLATELETS Thousands/uL 204   NEUTROS PCT % 63   LYMPHS PCT % 22   MONOS PCT % 9   EOS PCT % 5     Results from last 7 days   Lab Units 02/13/22  0537 02/11/22  0633 02/10/22  1324   POTASSIUM mmol/L 3 9   < > 4 5   CHLORIDE mmol/L 106   < > 107   CO2 mmol/L 26   < > 21   BUN mg/dL 90*   < > 84*   CREATININE mg/dL 2 93*   < > 3 13*   CALCIUM mg/dL 8 5   < > 8 1*   ALK PHOS U/L  --   --  109   ALT U/L  --   --  19   AST U/L  --   --  17    < > = values in this interval not displayed  I Have Reviewed All Lab Data Listed Above          Recent Cultures (last 7 days):           Last 24 Hours Medication List:   Current Facility-Administered Medications   Medication Dose Route Frequency Provider Last Rate    acetaminophen  650 mg Oral Q4H PRN Shannon Turner PA-C      allopurinol  100 mg Oral Daily Rachel Muhammad MD      aspirin  81 mg Oral Daily Rachel Muhammad MD      atorvastatin  40 mg Oral After Nicolette Mcdonald MD      citalopram  40 mg Oral Daily Rachel Muhammad MD      clopidogrel  75 mg Oral Daily Rachel Muhammad MD      ferrous sulfate  325 mg Oral Daily With Breakfast CHERELLE Pérez      furosemide  40 mg Intravenous BID Rachel Muhammad MD      gabapentin  300 mg Oral BID Rachel Muhammad MD      heparin (porcine)  5,000 Units Subcutaneous [facility] Chaparrita Hope MD      HYDROcodone-acetaminophen  1 tablet Oral Q8H PRN Damon Langston PA-C      insulin glargine  50 Units Subcutaneous HS Chaparrita Hope MD      insulin lispro  15 Units Subcutaneous TID With Meals Chaparrita Hope MD      insulin lispro  2-12 Units Subcutaneous TID [facility] Chaparrita Hope MD      isosorbide mononitrate  60 mg Oral Daily Chaparrita Hope MD      levothyroxine  50 mcg Oral Daily Chaparrita Hope MD      OLANZapine  5 mg Oral HS Chaparrita Hope MD      pantoprazole  40 mg Oral BID Chaparrita Hope MD      sodium chloride  1 spray Each Nare Q1H PRN Shandra Tompkins MD      tiZANidine  2 mg Oral Q8H PRN Chaparrita Hope MD          Today, Patient Was Seen By: Shandra Tompkins MD

## 2022-02-13 NOTE — PROGRESS NOTES
Progress Note - Nephrology   Saulo Ross 63 y o  female MRN: [id number]  Unit/Bed#: E5 -01 Encounter: [id number]    53-year-old female with past history of progressive Chronic Kidney Disease IV, uncontrolled diabetes, systolic/diastolic CHF, hypertension who presented with worsening shortness of breath, leg edema and weight gain    Nephrology consulted for acute kidney injury on top of Chronic Kidney Disease     ASSESSMENT and PLAN:  Acute kidney injury (POA):  On top of Chronic Kidney Disease IV  Etiology ALFRED: Suspect cardiorenal in the setting of volume overload  Chronic Kidney Disease etiology:  Suspect diabetic kidney disease, cardiorenal hypertensive nephrosclerosis and morbid obesity  Assessment:  · After review of medical records through [address] [place] [address] it appears that the patient  has a baseline Creatinine of mid two range  · Follows with Dr Roach as outpatient  · Patient was admitted with a creatinine of 3 1  · Patient's creatinine today is at 2 96  · Currently on IV diuretics 40 mg b i d  per Cardiology  · Likely requires higher creatinine to keep euvolemic  · Acid base and lytes stable   · Has Camargo catheter  · Clinically, patient is not uremic and there is no acute indication for renal replacement therapy   Workup:  · Urinalysis with micro reports:  Small blood, positive nitrates, trace leukocytes, plus two protein, 2-4  RBCs, 2-4 WBCs  Plan:  · Avoid nephrotoxins, adjust meds to appropriate GFR  · Optimize hemodynamic status to avoid delay in renal recovery  · Continue with diuretic regimen per Cardiology  · Continue with Camargo catheter  · Need accurate I&O  · Continue trend I/O, lab values volume status closely  · Check labs in a m      Blood pressure/Hypertension:  Assessment and Plan:  · Current blood pressure acceptable  · Current medications include:  Lasix 40 mg IV 2 times daily, Imdur 60 mg daily  · Maximize hemodynamics to maintain MAP >65  · Avoid hypotension or fluctuations in blood pressure  · Will continue to trend     Systolic and diastolic CHF exacerbation:  Assessment and Plan:  · Cardiology following  · Recent echocardiogram revealed EF of 45% with grade 2 diastolic dysfunction  · Improving volume status improving  · Remains on IV Lasix 40 mg b i d  per Cardiology  · Standing scale and accurate I&O  · Remain on room air     Nephrotic range proteinuria  Assessment and Plan:  · Previous urine micro creatinine ratio was 5 2 in August 2021  · Suspect etiology likely secondary to uncontrolled diabetes, morbid obesity related FSGS  · Not on Ace/Arb as outpatient-continue to avoid given ALFRED  · Will repeat in steady state as outpatient     Anemia of Chronic Kidney Disease  Assessment and Plan:  · Current hemoglobin 8 2  · Iron stores on 02/11/2022 reveals iron 41, ferritin 42, iron saturation 19, TIBC 260  · ferrous sulfate one tab daily started yesterday  · Continue trend  · For primary team      SUBJECTIVE:  Patient seen and examined at bedside  Patient denies chest pain or shortness of breath    Feels okay overall    OBJECTIVE:  Current Weight: Weight - Scale: 135 kg (297 lb 9 9 oz)  Vitals:    02/12/22 2257 02/13/22 0239 02/13/22 0537 02/13/22 0709   BP: 164/66 153/69  142/67   Pulse: 69 67  63   Resp: 14 18     Temp: 98 °F (36 7 °C) 97 9 °F (36 6 °C)  97 8 °F (36 6 °C)   TempSrc:       SpO2: 92% 90%  97%   Weight:   135 kg (297 lb 9 9 oz)    Height:           Intake/Output Summary (Last 24 hours) at 2/13/2022 1223  Last data filed at 2/13/2022 0201  Gross per 24 hour   Intake 837 7 ml   Output 2250 ml   Net -1412 3 ml     General:  No acute distress, cooperative, obese, resting in bed  Skin:  Warm and dry without rash  ENMT:  Mucous membranes moist, sclera anicteric  Respiratory:  Essentially clear on auscultation without crackles, rhonchi, wheezes, creases based  Cardiac:  Regular rate and rhythm without rub, or murmur, no JVD,, improved lower extremity edema +1  GI: Soft, nontender, no distention, active bowel sounds  :  Camargo leg bag-for clear yellow urine  Neuro:  Alert oriented and awake  Psych:  Appropriate affect    Medications:    Current Facility-Administered Medications:     acetaminophen (TYLENOL) tablet 650 mg, 650 mg, Oral, Q4H PRN, Farrukh Lopez PA-C    allopurinol (ZYLOPRIM) tablet 100 mg, 100 mg, Oral, Daily, Nandini Ramires MD, 100 mg at 02/13/22 4909    aspirin (ECOTRIN LOW STRENGTH) EC tablet 81 mg, 81 mg, Oral, Daily, Wonda Cea Ambika Meyer MD, 81 mg at 02/13/22 8721    atorvastatin (LIPITOR) tablet 40 mg, 40 mg, Oral, After Trina Hazel MD, 40 mg at 02/12/22 1725    citalopram (CeleXA) tablet 40 mg, 40 mg, Oral, Daily, Nandini Ramires MD, 40 mg at 02/13/22 0058    clopidogrel (PLAVIX) tablet 75 mg, 75 mg, Oral, Daily, Nandini Ramires MD, 75 mg at 02/13/22 5033    ferrous sulfate tablet 325 mg, 325 mg, Oral, Daily With Breakfast, CHERELLE Abernathy, 325 mg at 02/13/22 3542    furosemide (LASIX) injection 40 mg, 40 mg, Intravenous, BID, Nandini Ramires MD, 40 mg at 02/13/22 8176    gabapentin (NEURONTIN) capsule 300 mg, 300 mg, Oral, BID, Nandini Ramires MD, 300 mg at 02/13/22 0406    heparin (porcine) subcutaneous injection 5,000 Units, 5,000 Units, Subcutaneous, Q8H [address], 5,000 Units at 02/13/22 0523 **AND** [COMPLETED] Platelet count, , , Once, Nandini Ramires MD    HYDROcodone-acetaminophen [facility]) 5-325 mg per tablet 1 tablet, 1 tablet, Oral, Q8H PRN, Farrukh Lopez PA-C, 1 tablet at 02/13/22 0916    insulin glargine (LANTUS) subcutaneous injection 50 Units 0 5 mL, 50 Units, Subcutaneous, HS, Nandini Ramires MD, 50 Units at 02/12/22 2204    insulin lispro (HumaLOG) 100 units/mL subcutaneous injection 15 Units, 15 Units, Subcutaneous, TID With Meals, Nandini Ramires MD, 15 Units at 02/13/22 1115    insulin lispro (HumaLOG) 100 units/mL subcutaneous injection 2-12 Units, 2-12 Units, Subcutaneous, TID AC, 4 Units at 02/13/22 1114 **AND** Fingerstick Glucose (POCT), , , TID AC, Pastora Fournier MD    isosorbide mononitrate (IMDUR) 24 hr tablet 60 mg, 60 mg, Oral, Daily, Pastora Fournier MD, 60 mg at 02/13/22 4054    levothyroxine tablet 50 mcg, 50 mcg, Oral, Daily, Pastora Fournier MD, 50 mcg at 02/13/22 0520    OLANZapine (ZyPREXA) tablet 5 mg, 5 mg, Oral, HS, Pastora Fournier MD, 5 mg at 02/12/22 2204    pantoprazole (PROTONIX) EC tablet 40 mg, 40 mg, Oral, BID, Pastora Fournier MD, 40 mg at 02/13/22 0520    sodium chloride (OCEAN) 0 65 % nasal spray 1 spray, 1 spray, Each Nare, Q1H PRN, Rossi Vasquez MD, 1 spray at 02/13/22 0921    tiZANidine (ZANAFLEX) tablet 2 mg, 2 mg, Oral, Q8H PRN, Pastora Fournier MD, 2 mg at 02/12/22 0520    Laboratory Results:  Results from last 7 days   Lab Units 02/13/22  0537 02/12/22  0625 02/11/22  0633 02/10/22  1817 02/10/22  1324   WBC Thousand/uL 7 04 7 11 7 79  --  11 06*   HEMOGLOBIN g/dL 8 2* 8 0* 7 6*  --  9 1*   HEMATOCRIT % 25 5* 24 6* 23 6*  --  28 1*   PLATELETS Thousands/uL 204 192 186 219 227   SODIUM mmol/L 142 142 142  --  140   POTASSIUM mmol/L 3 9 3 8 3 8  --  4 5   CHLORIDE mmol/L 106 108 108  --  107   CO2 mmol/L 26 24 25  --  21   BUN mg/dL 90* 82* 80*  --  84*   CREATININE mg/dL 2 93* 2 86* 3 07*  --  3 13*   CALCIUM mg/dL 8 5 8 7 8 3  --  8 1*   MAGNESIUM mg/dL  --  1 9  --   --   --

## 2022-02-13 NOTE — ASSESSMENT & PLAN NOTE
Wt Readings from Last 3 Encounters:   02/13/22 135 kg (297 lb 9 9 oz)   02/10/22 (!) 137 kg (302 lb)   12/16/21 125 kg (276 lb 4 8 oz)     This is a 62-year-old female with history of CKD stage 4, depression, diabetes mellitus, hypertension, hyperlipidemia, CHF presented with progressive worsening dyspnea and lower extremity edema for the past 2 weeks  · NT proBNP 2094, chest x-ray with trace left pleural effusion, significant lower extremity edema  · Cardiology follow-up appreciated  · Continue with furosemide IV 40 mg b i d   · Continue aspirin, Plavix, statin, Imdur   · Weight unchanged  · Cardiology to re-dose metolazone today  · Monitor I and O, daily weight, fluid restriction  · Maintained on low-dose torsemide 20 mg daily prior to admission

## 2022-02-13 NOTE — PLAN OF CARE
Problem: GENITOURINARY - ADULT  Goal: Maintains or returns to baseline urinary function  Description: INTERVENTIONS:  - Assess urinary function  - Encourage oral fluids to ensure adequate hydration if ordered  - Administer IV fluids as ordered to ensure adequate hydration  - Administer ordered medications as needed  - Offer frequent toileting  - Follow urinary retention protocol if ordered  Outcome: Progressing  Goal: Absence of urinary retention  Description: INTERVENTIONS:  - Assess patients ability to void and empty bladder  - Monitor I/O  - Bladder scan as needed  - Discuss with physician/AP medications to alleviate retention as needed  - Discuss catheterization for long term situations as appropriate  Outcome: Progressing  Goal: Urinary catheter remains patent  Description: INTERVENTIONS:  - Assess patency of urinary catheter  - If patient has a chronic morales, consider changing catheter if non-functioning  - Follow guidelines for intermittent irrigation of non-functioning urinary catheter  Outcome: Progressing     Problem: METABOLIC, FLUID AND ELECTROLYTES - ADULT  Goal: Electrolytes maintained within normal limits  Description: INTERVENTIONS:  - Monitor labs and assess patient for signs and symptoms of electrolyte imbalances  - Administer electrolyte replacement as ordered  - Monitor response to electrolyte replacements, including repeat lab results as appropriate  - Instruct patient on fluid and nutrition as appropriate  Outcome: Progressing  Goal: Fluid balance maintained  Description: INTERVENTIONS:  - Monitor labs   - Monitor I/O and WT  - Instruct patient on fluid and nutrition as appropriate  - Assess for signs & symptoms of volume excess or deficit  Outcome: Progressing  Goal: Glucose maintained within target range  Description: INTERVENTIONS:  - Monitor Blood Glucose as ordered  - Assess for signs and symptoms of hyperglycemia and hypoglycemia  - Administer ordered medications to maintain glucose within target range  - Assess nutritional intake and initiate nutrition service referral as needed  Outcome: Progressing       Bed Management:  -Have minimal linens on bed & keep smooth, unwrinkled  -Change linens as needed when moist or perspiring      Problem: MUSCULOSKELETAL - ADULT  Goal: Maintain or return mobility to safest level of function  Description: INTERVENTIONS:  - Assess patient's ability to carry out ADLs; assess patient's baseline for ADL function and identify physical deficits which impact ability to perform ADLs (bathing, care of mouth/teeth, toileting, grooming, dressing, etc )  - Assess/evaluate cause of self-care deficits   - Assess range of motion  - Assess patient's mobility  - Assess patient's need for assistive devices and provide as appropriate  - Encourage maximum independence but intervene and supervise when necessary  - Involve family in performance of ADLs  - Assess for home care needs following discharge   - Consider OT consult to assist with ADL evaluation and planning for discharge  - Provide patient education as appropriate  Outcome: Progressing  Goal: Maintain proper alignment of affected body part  Description: INTERVENTIONS:  - Support, maintain and protect limb and body alignment  - Provide patient/ family with appropriate education  Outcome: Progressing     Problem: Potential for Falls  Goal: Patient will remain free of falls  Description: INTERVENTIONS:  - Educate patient/family on patient safety including physical limitations  - Instruct patient to call for assistance with activity   - Consult OT/PT to assist with strengthening/mobility   - Keep Call bell within reach  - Keep bed low and locked with side rails adjusted as appropriate  - Keep care items and personal belongings within reach  - Initiate and maintain comfort rounds  - Make Fall Risk Sign visible to staff    - Apply yellow socks and bracelet for high fall risk patients  - Consider moving patient to room near nurses station  Outcome: Progressing

## 2022-02-13 NOTE — PLAN OF CARE
Problem: GENITOURINARY - ADULT  Goal: Maintains or returns to baseline urinary function  Description: INTERVENTIONS:  - Assess urinary function  - Encourage oral fluids to ensure adequate hydration if ordered  - Administer IV fluids as ordered to ensure adequate hydration  - Administer ordered medications as needed  - Offer frequent toileting  - Follow urinary retention protocol if ordered  Outcome: Progressing  Goal: Absence of urinary retention  Description: INTERVENTIONS:  - Assess patients ability to void and empty bladder  - Monitor I/O  - Bladder scan as needed  - Discuss with physician/AP medications to alleviate retention as needed  - Discuss catheterization for long term situations as appropriate  Outcome: Progressing  Goal: Urinary catheter remains patent  Description: INTERVENTIONS:  - Assess patency of urinary catheter  - If patient has a chronic morales, consider changing catheter if non-functioning  - Follow guidelines for intermittent irrigation of non-functioning urinary catheter  Outcome: Progressing     Problem: METABOLIC, FLUID AND ELECTROLYTES - ADULT  Goal: Electrolytes maintained within normal limits  Description: INTERVENTIONS:  - Monitor labs and assess patient for signs and symptoms of electrolyte imbalances  - Administer electrolyte replacement as ordered  - Monitor response to electrolyte replacements, including repeat lab results as appropriate  - Instruct patient on fluid and nutrition as appropriate  Outcome: Progressing  Goal: Fluid balance maintained  Description: INTERVENTIONS:  - Monitor labs   - Monitor I/O and WT  - Instruct patient on fluid and nutrition as appropriate  - Assess for signs & symptoms of volume excess or deficit  Outcome: Progressing  Goal: Glucose maintained within target range  Description: INTERVENTIONS:  - Monitor Blood Glucose as ordered  - Assess for signs and symptoms of hyperglycemia and hypoglycemia  - Administer ordered medications to maintain glucose within target range  - Assess nutritional intake and initiate nutrition service referral as needed  Outcome: Progressing     Problem: SKIN/TISSUE INTEGRITY - ADULT  Goal: Skin Integrity remains intact(Skin Breakdown Prevention)  Description: Assess:  -Perform [place] assessment every   -Clean and moisturize skin every   -Inspect skin when repositioning, toileting, and assisting with ADLS  -Assess under medical devices such as  every   -Assess extremities for adequate circulation and sensation     Bed Management:  -Have minimal linens on bed & keep smooth, unwrinkled  -Change linens as needed when moist or perspiring  -Avoid sitting or lying in one position for more than  hours while in bed  -Keep HOB at degrees     Toileting:  -Offer bedside commode  -Assess for incontinence every   -Use incontinent care products after each incontinent episode such as     Activity:  -Mobilize patient  times a day  -Encourage activity and walks on unit  -Encourage or provide ROM exercises   -Turn and reposition patient every  Hours  -Use appropriate equipment to lift or move patient in bed  -Instruct/ Assist with weight shifting every  when out of bed in chair  -Consider limitation of chair time  hour intervals    Skin Care:  -Avoid use of baby powder, tape, friction and shearing, hot water or constrictive clothing  -Relieve pressure over bony prominences using   -Do not massage red bony areas    Next Steps:  -Teach patient strategies to minimize risks such as    -Consider consults to  interdisciplinary teams such as   Outcome: Progressing  Goal: Incision(s), wounds(s) or drain site(s) healing without S/S of infection  Description: INTERVENTIONS  - Assess and document dressing, incision, wound bed, drain sites and surrounding tissue  - Provide patient and family education  - Perform skin care/dressing changes every   Outcome: Progressing  Goal: Pressure injury heals and does not worsen  Description: Interventions:  - Implement low air loss mattress or specialty surface (Criteria met)  - Apply silicone foam dressing  - Instruct/assist with weight shifting every  minutes when in chair   - Limit chair time to  hour intervals  - Use special pressure reducing interventions such as  when in chair   - Apply fecal or urinary incontinence containment device   - Perform passive or active ROM every   - Turn and reposition patient & offload bony prominences every  hours   - Utilize friction reducing device or surface for transfers   - Consider consults to  interdisciplinary teams such as   - Use incontinent care products after each incontinent episode such as   - Consider nutrition services referral as needed  Outcome: Progressing     Problem: MUSCULOSKELETAL - ADULT  Goal: Maintain or return mobility to safest level of function  Description: INTERVENTIONS:  - Assess patient's ability to carry out ADLs; assess patient's baseline for ADL function and identify physical deficits which impact ability to perform ADLs (bathing, care of mouth/teeth, toileting, grooming, dressing, etc )  - Assess/evaluate cause of self-care deficits   - Assess range of motion  - Assess patient's mobility  - Assess patient's need for assistive devices and provide as appropriate  - Encourage maximum independence but intervene and supervise when necessary  - Involve family in performance of ADLs  - Assess for home care needs following discharge   - Consider OT consult to assist with ADL evaluation and planning for discharge  - Provide patient education as appropriate  Outcome: Progressing  Goal: Maintain proper alignment of affected body part  Description: INTERVENTIONS:  - Support, maintain and protect limb and body alignment  - Provide patient/ family with appropriate education  Outcome: Progressing     Problem: Potential for Falls  Goal: Patient will remain free of falls  Description: INTERVENTIONS:  - Educate patient/family on patient safety including physical limitations  - Instruct patient to call for assistance with activity   - Consult OT/PT to assist with strengthening/mobility   - Keep Call bell within reach  - Keep bed low and locked with side rails adjusted as appropriate  - Keep care items and personal belongings within reach  - Initiate and maintain comfort rounds  - Make Fall Risk Sign visible to staff  - Offer Toileting every  Hours, in advance of need  - Initiate/Maintain alarm  - Obtain necessary fall risk management equipment:   - Apply yellow socks and bracelet for high fall risk patients  - Consider moving patient to room near nurses station  Outcome: Progressing

## 2022-02-13 NOTE — PROGRESS NOTES
Cardiology         Progress Note - Cardiology   Yasir Greco 63 y o  female MRN: [id number]  Unit/Bed#: E5 -01 Encounter: [id number]          Assessment/Recommendations/Discussion:   1  Acute on chronic systolic and diastolic CHF  2  Nonischemic myocardial injury secondary to above  3  AKA and CKD  4  CAD, prior PCI x5 to the LAD and circumflex, CT of the RCA noted on prior cardiac catheterization 08/2021  5  Hypertension  6  Dyslipidemia  7  Diabetes    · Very good urine output  Will re-dose metolazone today  Continue IV Lasix  Prior weight in December was 276 lb    · Continue aspirin, Plavix, statin, isosorbide        Subjective:  Patient seen and examined, feels well, reports good urine output          Physical Exam:  GEN:  NAD  HEENT:  MMM, NCAT, pink conjunctiva, EOMI, nonicteric sclera  CV:  NO JVD/HJR, RR, NO M/R/G, +S1/S2, NO PARASTERNAL HEAVE/THRILL, ++LE EDEMA, NO HEPATIC SYSTOLIC PULSATION, WARM EXTREMITIES  RESP:  CTAB/L  ABD:  SOFT, NT, NO GROSS ORGANOMEGALY        Vitals:   /67   Pulse 63   Temp 97 8 °F (36 6 °C)   Resp 18   Ht 5' 8" (1 727 m)   Wt 135 kg (297 lb 9 9 oz)   SpO2 97%   BMI 45 25 kg/m²   Vitals:    02/12/22 0600 02/13/22 0537   Weight: 135 kg (296 lb 15 4 oz) 135 kg (297 lb 9 9 oz)       Intake/Output Summary (Last 24 hours) at 2/13/2022 1121  Last data filed at 2/13/2022 0201  Gross per 24 hour   Intake 837 7 ml   Output 2250 ml   Net -1412 3 ml       TELEMETRY:  Off  Lab Results:  Results from last 7 days   Lab Units 02/13/22  0537   WBC Thousand/uL 7 04   HEMOGLOBIN g/dL 8 2*   HEMATOCRIT % 25 5*   PLATELETS Thousands/uL 204     Results from last 7 days   Lab Units 02/13/22  0537 02/11/22  0633 02/10/22  1324   POTASSIUM mmol/L 3 9   < > 4 5   CHLORIDE mmol/L 106   < > 107   CO2 mmol/L 26   < > 21   BUN mg/dL 90*   < > 84*   CREATININE mg/dL 2 93*   < > 3 13*   CALCIUM mg/dL 8 5   < > 8 1*   ALK PHOS U/L  --   --  109   ALT U/L  --   --  19   AST U/L  --   -- 17    < > = values in this interval not displayed       Results from last 7 days   Lab Units 02/13/22  0537   POTASSIUM mmol/L 3 9   CHLORIDE mmol/L 106   CO2 mmol/L 26   BUN mg/dL 90*   CREATININE mg/dL 2 93*   CALCIUM mg/dL 8 5           Medications:    Current Facility-Administered Medications:     acetaminophen (TYLENOL) tablet 650 mg, 650 mg, Oral, Q4H PRN, Yasemin Rucker PA-C    allopurinol (ZYLOPRIM) tablet 100 mg, 100 mg, Oral, Daily, Cliff Blackman MD, 100 mg at 02/13/22 2830    aspirin (ECOTRIN LOW STRENGTH) EC tablet 81 mg, 81 mg, Oral, Daily, Cliff Blackman MD, 81 mg at 02/13/22 5700    atorvastatin (LIPITOR) tablet 40 mg, 40 mg, Oral, After Eleonora Foster MD, 40 mg at 02/12/22 1725    citalopram (CeleXA) tablet 40 mg, 40 mg, Oral, Daily, Cliff Blackman MD, 40 mg at 02/13/22 5352    clopidogrel (PLAVIX) tablet 75 mg, 75 mg, Oral, Daily, Cliff Blackman MD, 75 mg at 02/13/22 3429    ferrous sulfate tablet 325 mg, 325 mg, Oral, Daily With Breakfast, Roselee Goodell, CRNP, 325 mg at 02/13/22 4212    furosemide (LASIX) injection 40 mg, 40 mg, Intravenous, BID, Cliff Blackman MD, 40 mg at 02/13/22 6437    gabapentin (NEURONTIN) capsule 300 mg, 300 mg, Oral, BID, Cliff Blackman MD, 300 mg at 02/13/22 2894    heparin (porcine) subcutaneous injection 5,000 Units, 5,000 Units, Subcutaneous, Q8H [address], 5,000 Units at 02/13/22 0523 **AND** [COMPLETED] Platelet count, , , Once, Cliff Blackman MD    HYDROcodone-acetaminophen [facility]) 5-325 mg per tablet 1 tablet, 1 tablet, Oral, Q8H PRN, Yasemin Rucker PA-C, 1 tablet at 02/13/22 0916    insulin glargine (LANTUS) subcutaneous injection 50 Units 0 5 mL, 50 Units, Subcutaneous, HS, Cliff Blackman MD, 50 Units at 02/12/22 2200    insulin lispro (HumaLOG) 100 units/mL subcutaneous injection 15 Units, 15 Units, Subcutaneous, TID With Meals, Melina Diamond MD, 15 Units at 02/13/22 1115    insulin lispro (HumaLOG) 100 units/mL subcutaneous injection 2-12 Units, 2-12 Units, Subcutaneous, TID AC, 4 Units at 02/13/22 1114 **AND** Fingerstick Glucose (POCT), , , TID AC, Melina Diamond MD    isosorbide mononitrate (IMDUR) 24 hr tablet 60 mg, 60 mg, Oral, Daily, Melina Diamond MD, 60 mg at 02/13/22 1546    levothyroxine tablet 50 mcg, 50 mcg, Oral, Daily, Melina Diamond MD, 50 mcg at 02/13/22 0520    OLANZapine (ZyPREXA) tablet 5 mg, 5 mg, Oral, HS, Melina Diamond MD, 5 mg at 02/12/22 2204    pantoprazole (PROTONIX) EC tablet 40 mg, 40 mg, Oral, BID, Melina Diamond MD, 40 mg at 02/13/22 0520    sodium chloride (OCEAN) 0 65 % nasal spray 1 spray, 1 spray, Each Nare, Q1H PRN, Luis Stephenson MD, 1 spray at 02/13/22 0921    tiZANidine (ZANAFLEX) tablet 2 mg, 2 mg, Oral, Q8H PRN, Melina Diamond MD, 2 mg at 02/12/22 3936    This note was completed in part utilizing M-[employer] Flu[employer] Direct Software  Grammatical errors, random word insertions, spelling mistakes, and incomplete sentences may be an occasional consequence of this system secondary to software limitations, ambient noise, and hardware issues  If you have any questions or concerns about the content, text, or information contained within the body of this dictation, please contact the provider for clarification

## 2022-02-13 NOTE — ASSESSMENT & PLAN NOTE
· Acute renal failure on CKD stage 4  · Baseline creatinine in the mid 2s  · Creatinine 3 1 on admission, improved to 2 96  · Acute kidney injury likely secondary to cardiorenal and volume overload  · Nephrology consultation appreciated  · Urinalysis, bladder scan  · Continue with IV diuretics  · Closely monitor renal function

## 2022-02-14 DIAGNOSIS — I10 ESSENTIAL HYPERTENSION: ICD-10-CM

## 2022-02-14 LAB
ANION GAP SERPL CALCULATED.3IONS-SCNC: 11 MMOL/L (ref 4–13)
BASOPHILS # BLD AUTO: 0.03 THOUSANDS/ΜL (ref 0–0.1)
BASOPHILS NFR BLD AUTO: 1 % (ref 0–1)
BUN SERPL-MCNC: 95 MG/DL (ref 5–25)
CALCIUM SERPL-MCNC: 8.8 MG/DL (ref 8.3–10.1)
CHLORIDE SERPL-SCNC: 103 MMOL/L (ref 100–108)
CO2 SERPL-SCNC: 26 MMOL/L (ref 21–32)
CREAT SERPL-MCNC: 2.94 MG/DL (ref 0.6–1.3)
EOSINOPHIL # BLD AUTO: 0.24 THOUSAND/ΜL (ref 0–0.61)
EOSINOPHIL NFR BLD AUTO: 4 % (ref 0–6)
ERYTHROCYTE [DISTWIDTH] IN BLOOD BY AUTOMATED COUNT: 15.9 % (ref 11.6–15.1)
GFR SERPL CREATININE-BSD FRML MDRD: 16 ML/MIN/1.73SQ M
GLUCOSE SERPL-MCNC: 225 MG/DL (ref 65–140)
GLUCOSE SERPL-MCNC: 235 MG/DL (ref 65–140)
GLUCOSE SERPL-MCNC: 258 MG/DL (ref 65–140)
GLUCOSE SERPL-MCNC: 266 MG/DL (ref 65–140)
GLUCOSE SERPL-MCNC: 312 MG/DL (ref 65–140)
HCT VFR BLD AUTO: 25.1 % (ref 34.8–46.1)
HGB BLD-MCNC: 7.8 G/DL (ref 11.5–15.4)
IMM GRANULOCYTES # BLD AUTO: 0.02 THOUSAND/UL (ref 0–0.2)
IMM GRANULOCYTES NFR BLD AUTO: 0 % (ref 0–2)
LYMPHOCYTES # BLD AUTO: 1.44 THOUSANDS/ΜL (ref 0.6–4.47)
LYMPHOCYTES NFR BLD AUTO: 25 % (ref 14–44)
MAGNESIUM SERPL-MCNC: 2 MG/DL (ref 1.6–2.6)
MCH RBC QN AUTO: 31.5 PG (ref 26.8–34.3)
MCHC RBC AUTO-ENTMCNC: 31.1 G/DL (ref 31.4–37.4)
MCV RBC AUTO: 101 FL (ref 82–98)
MONOCYTES # BLD AUTO: 0.42 THOUSAND/ΜL (ref 0.17–1.22)
MONOCYTES NFR BLD AUTO: 7 % (ref 4–12)
NEUTROPHILS # BLD AUTO: 3.57 THOUSANDS/ΜL (ref 1.85–7.62)
NEUTS SEG NFR BLD AUTO: 63 % (ref 43–75)
NRBC BLD AUTO-RTO: 0 /100 WBCS
PLATELET # BLD AUTO: 167 THOUSANDS/UL (ref 149–390)
PMV BLD AUTO: 10.2 FL (ref 8.9–12.7)
POTASSIUM SERPL-SCNC: 3.8 MMOL/L (ref 3.5–5.3)
RBC # BLD AUTO: 2.48 MILLION/UL (ref 3.81–5.12)
SODIUM SERPL-SCNC: 140 MMOL/L (ref 136–145)
WBC # BLD AUTO: 5.72 THOUSAND/UL (ref 4.31–10.16)

## 2022-02-14 PROCEDURE — 83735 ASSAY OF MAGNESIUM: CPT | Performed by: NURSE PRACTITIONER

## 2022-02-14 PROCEDURE — 85025 COMPLETE CBC W/AUTO DIFF WBC: CPT | Performed by: INTERNAL MEDICINE

## 2022-02-14 PROCEDURE — 99232 SBSQ HOSP IP/OBS MODERATE 35: CPT | Performed by: INTERNAL MEDICINE

## 2022-02-14 PROCEDURE — 80048 BASIC METABOLIC PNL TOTAL CA: CPT | Performed by: NURSE PRACTITIONER

## 2022-02-14 PROCEDURE — 82948 REAGENT STRIP/BLOOD GLUCOSE: CPT

## 2022-02-14 RX ORDER — AMLODIPINE BESYLATE 5 MG/1
TABLET ORAL
Qty: 90 TABLET | Refills: 1 | Status: SHIPPED | OUTPATIENT
Start: 2022-02-14 | End: 2022-03-29 | Stop reason: SDUPTHER

## 2022-02-14 RX ORDER — OLANZAPINE 5 MG/1
TABLET ORAL
Qty: 90 TABLET | Refills: 0 | Status: SHIPPED | OUTPATIENT
Start: 2022-02-14 | End: 2022-03-29 | Stop reason: SDUPTHER

## 2022-02-14 RX ORDER — INSULIN GLARGINE 100 [IU]/ML
35 INJECTION, SOLUTION SUBCUTANEOUS
Status: DISCONTINUED | OUTPATIENT
Start: 2022-02-14 | End: 2022-02-16 | Stop reason: HOSPADM

## 2022-02-14 RX ADMIN — ASPIRIN 81 MG: 81 TABLET, COATED ORAL at 08:24

## 2022-02-14 RX ADMIN — INSULIN LISPRO 15 UNITS: 100 INJECTION, SOLUTION INTRAVENOUS; SUBCUTANEOUS at 12:01

## 2022-02-14 RX ADMIN — HEPARIN SODIUM 5000 UNITS: 5000 INJECTION INTRAVENOUS; SUBCUTANEOUS at 22:26

## 2022-02-14 RX ADMIN — INSULIN LISPRO 8 UNITS: 100 INJECTION, SOLUTION INTRAVENOUS; SUBCUTANEOUS at 17:27

## 2022-02-14 RX ADMIN — PANTOPRAZOLE SODIUM 40 MG: 40 TABLET, DELAYED RELEASE ORAL at 15:18

## 2022-02-14 RX ADMIN — OLANZAPINE 5 MG: 5 TABLET, FILM COATED ORAL at 22:25

## 2022-02-14 RX ADMIN — GABAPENTIN 300 MG: 300 CAPSULE ORAL at 17:27

## 2022-02-14 RX ADMIN — INSULIN LISPRO 15 UNITS: 100 INJECTION, SOLUTION INTRAVENOUS; SUBCUTANEOUS at 17:28

## 2022-02-14 RX ADMIN — FUROSEMIDE 60 MG: 10 INJECTION, SOLUTION INTRAMUSCULAR; INTRAVENOUS at 15:25

## 2022-02-14 RX ADMIN — INSULIN LISPRO 15 UNITS: 100 INJECTION, SOLUTION INTRAVENOUS; SUBCUTANEOUS at 08:26

## 2022-02-14 RX ADMIN — FUROSEMIDE 60 MG: 10 INJECTION, SOLUTION INTRAMUSCULAR; INTRAVENOUS at 08:25

## 2022-02-14 RX ADMIN — INSULIN GLARGINE 35 UNITS: 100 INJECTION, SOLUTION SUBCUTANEOUS at 22:25

## 2022-02-14 RX ADMIN — INSULIN LISPRO 6 UNITS: 100 INJECTION, SOLUTION INTRAVENOUS; SUBCUTANEOUS at 12:00

## 2022-02-14 RX ADMIN — ALLOPURINOL 100 MG: 100 TABLET ORAL at 08:24

## 2022-02-14 RX ADMIN — PANTOPRAZOLE SODIUM 40 MG: 40 TABLET, DELAYED RELEASE ORAL at 06:58

## 2022-02-14 RX ADMIN — GABAPENTIN 300 MG: 300 CAPSULE ORAL at 08:24

## 2022-02-14 RX ADMIN — HEPARIN SODIUM 5000 UNITS: 5000 INJECTION INTRAVENOUS; SUBCUTANEOUS at 05:29

## 2022-02-14 RX ADMIN — ISOSORBIDE MONONITRATE 60 MG: 60 TABLET, EXTENDED RELEASE ORAL at 06:59

## 2022-02-14 RX ADMIN — LEVOTHYROXINE SODIUM 50 MCG: 50 TABLET ORAL at 05:29

## 2022-02-14 RX ADMIN — CITALOPRAM HYDROBROMIDE 40 MG: 20 TABLET ORAL at 08:23

## 2022-02-14 RX ADMIN — CLOPIDOGREL BISULFATE 75 MG: 75 TABLET ORAL at 08:24

## 2022-02-14 RX ADMIN — HEPARIN SODIUM 5000 UNITS: 5000 INJECTION INTRAVENOUS; SUBCUTANEOUS at 15:22

## 2022-02-14 RX ADMIN — INSULIN LISPRO 4 UNITS: 100 INJECTION, SOLUTION INTRAVENOUS; SUBCUTANEOUS at 08:24

## 2022-02-14 RX ADMIN — FERROUS SULFATE TAB 325 MG (65 MG ELEMENTAL FE) 325 MG: 325 (65 FE) TAB at 08:24

## 2022-02-14 RX ADMIN — ATORVASTATIN CALCIUM 40 MG: 40 TABLET, FILM COATED ORAL at 17:27

## 2022-02-14 NOTE — ASSESSMENT & PLAN NOTE
· Acute renal failure on CKD stage 4  · Baseline creatinine in the mid 2s  · Creatinine 3 1 on admission, improved to 2 94  · Acute kidney injury likely secondary to cardiorenal and volume overload  · Nephrology consultation appreciated  · Urinalysis, bladder scan  · Continue with IV diuretics  · Closely monitor renal function

## 2022-02-14 NOTE — ASSESSMENT & PLAN NOTE
· Baseline hemoglobin 8-9  · Hemoglobin today 7 8  · No evidence of active bleeding  · Possibly secondary to CKD  · Will check iron panel, B12, folate  · Monitor H&H, transfuse if less than 7 5

## 2022-02-14 NOTE — PROGRESS NOTES
Progress Note - Cardiology   Luana Galaviz 63 y o  female MRN: [id number]  Unit/Bed#: E5 -01 Encounter: [id number]    Assessment:     Acute on chronic systolic and diastolic congestive Heart failure, appears compensated and euvolemic   Chronic kidney disease, stage IV and approaching stage 5     Non myocardial infarction troponin elevation   Coronary artery disease, PCI x5 to the LAD and circumflex,  of the RCA noted on prior catheterization   Hypertension   Hyperlipidemia   Diabetes mellitus type 2    Plan:     Patient appears euvolemic  If electrolytes are okay tomorrow, patient could be discharged from a cardiac perspective  Interval history:  Patient in good spirits  Her lungs are clear and she appears to be edema free    Trace lower extremity    PROBLEM LIST:    Patient Active Problem List    Diagnosis Date Noted    Elevated troponin 02/11/2022    Other artificial openings of urinary tract status ([facility] ) 02/10/2022    [facility] opioid dependence ([facility] ) 02/10/2022    Adrenal nodule ([facility] ) 02/10/2022    Stable angina ([facility] ) 02/10/2022    Volume overload 02/10/2022    Acquired hypothyroidism 10/14/2021    Mixed hyperlipidemia 10/14/2021    Gastroesophageal reflux disease without esophagitis 10/13/2021    Other proteinuria 09/14/2021    CKD (chronic kidney disease) 09/14/2021    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 heart failure ([facility] ) 08/24/2021    Prolonged QT interval 08/24/2021    Urinary tract infection associated with cystostomy catheter ([facility] ) 08/23/2021    Neurogenic bladder 08/23/2021    Morbid obesity with BMI of 45 0-49 9, adult ([facility] ) 06/15/2021    History of heart artery stent 06/15/2021    Acute renal failure superimposed on stage 4 chronic kidney disease ([facility] ) 06/04/2021    Memory impairment 05/26/2021    Chronic bronchitis ([facility] ) 05/25/2021    Severe episode of recurrent major depressive disorder, without psychotic features ([facility] ) 05/25/2021  Skin ulcer of hand, limited to breakdown of skin ([facility] ) 02/25/2021    HTN (hypertension) 12/21/2020    Diabetic ulcer of toe of right foot associated with type 2 diabetes mellitus, with muscle involvement without evidence of necrosis ([facility] ) 11/25/2020    Head lump 11/11/2020    Need for follow-up by  11/11/2020    Anemia 09/09/2020    Abnormal LFTs 09/09/2020    S/P cervical spinal fusion 09/09/2020    Major depressive disorder 09/02/2020    Type 2 diabetes mellitus with stage 4 chronic kidney disease, with long-term current use of insulin ([facility] ) 09/02/2020    Anxiety 09/02/2020    CAD (coronary artery disease) 09/02/2020    Osteoarthritis of left knee 09/02/2020    Cellulitis of finger of right hand 08/26/2020       Vitals: /55   Pulse 58   Temp (!) 97 3 °F (36 3 °C)   Resp 19   Ht 5' 8" (1 727 m)   Wt 135 kg (297 lb 9 9 oz)   SpO2 96%   BMI 45 25 kg/m²   PREVIOUS WEIGHTS:   Weight (last 2 days)     Date/Time Weight    02/14/22 0600 135 (297 62)    02/13/22 0537 135 (297 62)    02/12/22 0600 135 (296 96)          Wt Readings from Last 2 Encounters:   02/14/22 135 kg (297 lb 9 9 oz)   02/10/22 (!) 137 kg (302 lb)       Labs/Data:        Results from last 7 days   Lab Units 02/14/22  0523 02/13/22  0537 02/12/22  0625   WBC Thousand/uL 5 72 7 04 7 11   HEMOGLOBIN g/dL 7 8* 8 2* 8 0*   HEMATOCRIT % 25 1* 25 5* 24 6*   MCV fL 101* 98 101*   MCH pg 31 5 31 5 32 9   MCHC g/dL 31 1* 32 2 32 5   PLATELETS Thousands/uL 167 204 192     Results from last 7 days   Lab Units 02/14/22  0523 02/13/22  0537 02/12/22  0625   EGFR ml/min/1 73sq m 16 16 17   SODIUM mmol/L 140 142 142   POTASSIUM mmol/L 3 8 3 9 3 8   CHLORIDE mmol/L 103 106 108   CO2 mmol/L 26 26 24   BUN mg/dL 95* 90* 82*   CREATININE mg/dL 2 94* 2 93* 2 86*     Results from last 7 days   Lab Units 02/14/22  0523 02/13/22  0537 02/12/22  0625 02/11/22  0633 02/10/22  1324   CALCIUM mg/dL 8 8 8 5 8 7   < > 8 1*   AST U/L  -- --   --   --  17   ALT U/L  --   --   --   --  19   ALK PHOS U/L  --   --   --   --  109   MAGNESIUM mg/dL 2 0  --  1 9  --   --     < > = values in this interval not displayed           Lab Results   Component Value Date    NTBNP 2,094 (H) 02/10/2022    NTBNP 985 (H) 08/23/2021     Lab Results   Component Value Date    CHOLESTEROL 152 08/03/2021    TRIG 180 (H) 08/03/2021    HDL 30 (L) 08/03/2021    LDLCALC 86 08/03/2021    HGBA1C 8 5 (A) 12/16/2021         Current Facility-Administered Medications:     acetaminophen (TYLENOL) tablet 650 mg, 650 mg, Oral, Q4H PRN, Gabe Don PA-C    allopurinol (ZYLOPRIM) tablet 100 mg, 100 mg, Oral, Daily, Chad Finch MD, 100 mg at 02/14/22 8883    aspirin (ECOTRIN LOW STRENGTH) EC tablet 81 mg, 81 mg, Oral, Daily, Chad Finch MD, 81 mg at 02/14/22 6429    atorvastatin (LIPITOR) tablet 40 mg, 40 mg, Oral, After Trevor Delaney MD, 40 mg at 02/13/22 1739    citalopram (CeleXA) tablet 40 mg, 40 mg, Oral, Daily, Chad Finch MD, 40 mg at 02/14/22 5907    clopidogrel (PLAVIX) tablet 75 mg, 75 mg, Oral, Daily, Chad Finch MD, 75 mg at 02/14/22 7620    ferrous sulfate tablet 325 mg, 325 mg, Oral, Daily With Breakfast, [employer], 325 mg at 02/14/22 7052    furosemide (LASIX) injection 60 mg, 60 mg, Intravenous, BID (diuretic), Taryn Henson DO, 60 mg at 02/14/22 0825    gabapentin (NEURONTIN) capsule 300 mg, 300 mg, Oral, BID, Chad Finch MD, 300 mg at 02/14/22 2980    heparin (porcine) subcutaneous injection 5,000 Units, 5,000 Units, Subcutaneous, Q8H [address], 5,000 Units at 02/14/22 0529 **AND** [COMPLETED] Platelet count, , , Once, Chad Finch MD    HYDROcodone-acetaminophen [facility]) 5-325 mg per tablet 1 tablet, 1 tablet, Oral, Q8H PRN, Gabe Don PA-C, 1 tablet at 02/13/22 0916    insulin glargine (LANTUS) subcutaneous injection 35 Units 0 35 mL, 35 Units, Subcutaneous, HS, Karthikeyan Rey MD    insulin lispro (HumaLOG) 100 units/mL subcutaneous injection 15 Units, 15 Units, Subcutaneous, TID With Meals, Bree Staples MD, 15 Units at 02/14/22 1201    insulin lispro (HumaLOG) 100 units/mL subcutaneous injection 2-12 Units, 2-12 Units, Subcutaneous, TID AC, 6 Units at 02/14/22 1200 **AND** Fingerstick Glucose (POCT), , , TID AC, Bree Staples MD    isosorbide mononitrate (IMDUR) 24 hr tablet 60 mg, 60 mg, Oral, Daily, Bree Staples MD, 60 mg at 02/14/22 8029    levothyroxine tablet 50 mcg, 50 mcg, Oral, Daily, Bree Staples MD, 50 mcg at 02/14/22 0529    OLANZapine (ZyPREXA) tablet 5 mg, 5 mg, Oral, HS, Bree Staples MD, 5 mg at 02/13/22 2255    pantoprazole (PROTONIX) EC tablet 40 mg, 40 mg, Oral, BID, Bree Staples MD, 40 mg at 02/14/22 9692    sodium chloride (OCEAN) 0 65 % nasal spray 1 spray, 1 spray, Each Nare, Q1H PRN, Karthikeyan Rey MD, 1 spray at 02/13/22 0921    tiZANidine (ZANAFLEX) tablet 2 mg, 2 mg, Oral, Q8H PRN, Bree Staples MD, 2 mg at 02/12/22 8552  Allergies   Allergen Reactions    Codeine      Other reaction(s): Nausea and Vomiting    Latex Itching         Intake/Output Summary (Last 24 hours) at 2/14/2022 1337  Last data filed at 2/13/2022 2001  Gross per 24 hour   Intake 540 ml   Output 1400 ml   Net -860 ml       Invasive Devices  Report    Peripheral Intravenous Line            Peripheral IV 02/14/22 Right Forearm <1 day          Drain            Suprapubic Catheter Non-latex 16 Fr  131 days                Review of Systems   Respiratory: Negative for cough, choking, chest tightness, shortness of breath and wheezing  Cardiovascular: Negative for chest pain, palpitations and leg swelling  Musculoskeletal: Negative for gait problem  Skin: Negative for rash     Neurological: Negative for dizziness, tremors, syncope, weakness, light-headedness, numbness and headaches  Psychiatric/Behavioral: Negative for agitation and behavioral problems  The patient is not hyperactive  Physical Exam  Constitutional:       General: She is not in acute distress  Appearance: She is well-developed  HENT:      Head: Normocephalic and atraumatic  Neck:      Thyroid: No thyromegaly  Vascular: No carotid bruit or JVD  Trachea: No tracheal deviation  Cardiovascular:      Rate and Rhythm: Normal rate and regular rhythm  Pulses: Normal pulses  Heart sounds: Normal heart sounds  No murmur heard  No friction rub  No gallop  Pulmonary:      Effort: Pulmonary effort is normal  No respiratory distress  Breath sounds: Normal breath sounds  No wheezing, rhonchi or rales  Chest:      Chest wall: No tenderness  Abdominal:      General: Bowel sounds are normal       Palpations: Abdomen is soft  Musculoskeletal:         General: Normal range of motion  Cervical back: Normal range of motion and neck supple  Right lower leg: No edema  Left lower leg: No edema  Comments: Trace edema at most   Skin:     General: Skin is warm and dry  Neurological:      General: No focal deficit present  Mental Status: She is alert and oriented to person, place, and time  Psychiatric:         Mood and Affect: Mood normal          Behavior: Behavior normal          Thought Content: Thought content normal          Judgment: Judgment normal          ======================================================     Imaging:   I have personally reviewed pertinent reports  EKG:  Normal sinus rhythm on monitor      Portions of the record may have been created with voice recognition software  Occasional wrong word or "sound a like" substitutions may have occurred due to the inherent limitations of voice recognition software   Read the chart carefully and recognize, using context, where substitutions have occurred

## 2022-02-14 NOTE — PLAN OF CARE
Problem: GENITOURINARY - ADULT  Goal: Maintains or returns to baseline urinary function  Description: INTERVENTIONS:  - Assess urinary function  - Encourage oral fluids to ensure adequate hydration if ordered  - Administer IV fluids as ordered to ensure adequate hydration  - Administer ordered medications as needed  - Offer frequent toileting  - Follow urinary retention protocol if ordered  Outcome: Progressing  Goal: Absence of urinary retention  Description: INTERVENTIONS:  - Assess patients ability to void and empty bladder  - Monitor I/O  - Bladder scan as needed  - Discuss with physician/AP medications to alleviate retention as needed  - Discuss catheterization for long term situations as appropriate  Outcome: Progressing  Goal: Urinary catheter remains patent  Description: INTERVENTIONS:  - Assess patency of urinary catheter  - If patient has a chronic morales, consider changing catheter if non-functioning  - Follow guidelines for intermittent irrigation of non-functioning urinary catheter  Outcome: Progressing     Problem: METABOLIC, FLUID AND ELECTROLYTES - ADULT  Goal: Electrolytes maintained within normal limits  Description: INTERVENTIONS:  - Monitor labs and assess patient for signs and symptoms of electrolyte imbalances  - Administer electrolyte replacement as ordered  - Monitor response to electrolyte replacements, including repeat lab results as appropriate  - Instruct patient on fluid and nutrition as appropriate  Outcome: Progressing  Goal: Fluid balance maintained  Description: INTERVENTIONS:  - Monitor labs   - Monitor I/O and WT  - Instruct patient on fluid and nutrition as appropriate  - Assess for signs & symptoms of volume excess or deficit  Outcome: Progressing  Goal: Glucose maintained within target range  Description: INTERVENTIONS:  - Monitor Blood Glucose as ordered  - Assess for signs and symptoms of hyperglycemia and hypoglycemia  - Administer ordered medications to maintain glucose within target range  - Assess nutritional intake and initiate nutrition service referral as needed  Outcome: Progressing     Problem: SKIN/TISSUE INTEGRITY - ADULT  Goal: Skin Integrity remains intact(Skin Breakdown Prevention)  Description: Assess:  -Perform [place] assessment every shift  -Clean and moisturize skin every 6hr  -Inspect skin when repositioning, toileting, and assisting with ADLS  -Assess under medical devices such as  every   -Assess extremities for adequate circulation and sensation     Bed Management:  -Have minimal linens on bed & keep smooth, unwrinkled  -Change linens as needed when moist or perspiring  -Avoid sitting or lying in one position for more than 2 hours while in bed  -Keep HOB at 30degrees     Toileting:  -Offer bedside commode  -Assess for incontinence every 2hr   -Use incontinent care products after each incontinent episode such as     Activity:  -Mobilize patient 3 times a day  -Encourage activity and walks on unit  -Encourage or provide ROM exercises   -Turn and reposition patient every 2 Hours  -Use appropriate equipment to lift or move patient in bed  -Instruct/ Assist with weight shifting every 2 when out of bed in chair  -Consider limitation of chair time 2 hour intervals    Skin Care:  -Avoid use of baby powder, tape, friction and shearing, hot water or constrictive clothing  -Relieve pressure over bony prominences using pillow  -Do not massage red bony areas    Next Steps:  -Teach patient strategies to minimize risks such as    -Consider consults to  interdisciplinary teams such as   Outcome: Progressing  Goal: Incision(s), wounds(s) or drain site(s) healing without S/S of infection  Description: INTERVENTIONS  - Assess and document dressing, incision, wound bed, drain sites and surrounding tissue  - Provide patient and family education  - Perform skin care/dressing changes every   Outcome: Progressing  Goal: Pressure injury heals and does not worsen  Description: Interventions:  - Implement low air loss mattress or specialty surface (Criteria met)  - Apply silicone foam dressing  - Instruct/assist with weight shifting every 120 minutes when in chair   - Limit chair time to 2 hour intervals  - Use special pressure reducing interventions such as shifting when in chair   - Apply fecal or urinary incontinence containment device   - Perform passive or active ROM every 5  - Turn and reposition patient & offload bony prominences every 2 hours   - Utilize friction reducing device or surface for transfers   - Consider consults to  interdisciplinary teams such as   - Use incontinent care products after each incontinent episode such as   - Consider nutrition services referral as needed  Outcome: Progressing     Problem: MUSCULOSKELETAL - ADULT  Goal: Maintain or return mobility to safest level of function  Description: INTERVENTIONS:  - Assess patient's ability to carry out ADLs; assess patient's baseline for ADL function and identify physical deficits which impact ability to perform ADLs (bathing, care of mouth/teeth, toileting, grooming, dressing, etc )  - Assess/evaluate cause of self-care deficits   - Assess range of motion  - Assess patient's mobility  - Assess patient's need for assistive devices and provide as appropriate  - Encourage maximum independence but intervene and supervise when necessary  - Involve family in performance of ADLs  - Assess for home care needs following discharge   - Consider OT consult to assist with ADL evaluation and planning for discharge  - Provide patient education as appropriate  Outcome: Progressing  Goal: Maintain proper alignment of affected body part  Description: INTERVENTIONS:  - Support, maintain and protect limb and body alignment  - Provide patient/ family with appropriate education  Outcome: Progressing     Problem: Potential for Falls  Goal: Patient will remain free of falls  Description: INTERVENTIONS:  - Educate patient/family on patient safety including physical limitations  - Instruct patient to call for assistance with activity   - Consult OT/PT to assist with strengthening/mobility   - Keep Call bell within reach  - Keep bed low and locked with side rails adjusted as appropriate  - Keep care items and personal belongings within reach  - Initiate and maintain comfort rounds  - Make Fall Risk Sign visible to staff  - Offer Toileting every 2 Hours, in advance of need  - Initiate/Maintain bed alarm  - Obtain necessary fall risk management equipment:   - Apply yellow socks and bracelet for high fall risk patients  - Consider moving patient to room near nurses station  Outcome: Progressing     Problem: MOBILITY - ADULT  Goal: Maintain or return to baseline ADL function  Description: INTERVENTIONS:  -  Assess patient's ability to carry out ADLs; assess patient's baseline for ADL function and identify physical deficits which impact ability to perform ADLs (bathing, care of mouth/teeth, toileting, grooming, dressing, etc )  - Assess/evaluate cause of self-care deficits   - Assess range of motion  - Assess patient's mobility; develop plan if impaired  - Assess patient's need for assistive devices and provide as appropriate  - Encourage maximum independence but intervene and supervise when necessary  - Involve family in performance of ADLs  - Assess for home care needs following discharge   - Consider OT consult to assist with ADL evaluation and planning for discharge  - Provide patient education as appropriate  Outcome: Progressing  Goal: Maintains/Returns to pre admission functional level  Description: INTERVENTIONS:  - Perform BMAT or MOVE assessment daily    - Set and communicate daily mobility goal to care team and patient/family/caregiver  - Collaborate with rehabilitation services on mobility goals if consulted  - Perform Range of Motion 5 times a day  - Reposition patient every 2 hours    - Dangle patient 3 times a day  - Stand patient 3 times a day  - Ambulate patient 3 times a day  - Out of bed to chair 3 times a day   - Out of bed for meals 3 times a day  - Out of bed for toileting  - Record patient progress and toleration of activity level   Outcome: Progressing

## 2022-02-14 NOTE — PLAN OF CARE
Problem: GENITOURINARY - ADULT  Goal: Maintains or returns to baseline urinary function  Description: INTERVENTIONS:  - Assess urinary function  - Encourage oral fluids to ensure adequate hydration if ordered  - Administer IV fluids as ordered to ensure adequate hydration  - Administer ordered medications as needed  - Offer frequent toileting  - Follow urinary retention protocol if ordered  Outcome: Progressing  Goal: Absence of urinary retention  Description: INTERVENTIONS:  - Assess patients ability to void and empty bladder  - Monitor I/O  - Bladder scan as needed  - Discuss with physician/AP medications to alleviate retention as needed  - Discuss catheterization for long term situations as appropriate  Outcome: Progressing  Goal: Urinary catheter remains patent  Description: INTERVENTIONS:  - Assess patency of urinary catheter  - If patient has a chronic morales, consider changing catheter if non-functioning  - Follow guidelines for intermittent irrigation of non-functioning urinary catheter  Outcome: Progressing     Problem: METABOLIC, FLUID AND ELECTROLYTES - ADULT  Goal: Electrolytes maintained within normal limits  Description: INTERVENTIONS:  - Monitor labs and assess patient for signs and symptoms of electrolyte imbalances  - Administer electrolyte replacement as ordered  - Monitor response to electrolyte replacements, including repeat lab results as appropriate  - Instruct patient on fluid and nutrition as appropriate  Outcome: Progressing  Goal: Fluid balance maintained  Description: INTERVENTIONS:  - Monitor labs   - Monitor I/O and WT  - Instruct patient on fluid and nutrition as appropriate  - Assess for signs & symptoms of volume excess or deficit  Outcome: Progressing  Goal: Glucose maintained within target range  Description: INTERVENTIONS:  - Monitor Blood Glucose as ordered  - Assess for signs and symptoms of hyperglycemia and hypoglycemia  - Administer ordered medications to maintain glucose within target range  - Assess nutritional intake and initiate nutrition service referral as needed  Outcome: Progressing     Problem: SKIN/TISSUE INTEGRITY - ADULT  Goal: Skin Integrity remains intact(Skin Breakdown Prevention)  Description: Assess:  -Perform [place] assessment every shift  -Clean and moisturize skin every shift  -Inspect skin when repositioning, toileting, and assisting with ADLS    Problem: GENITOURINARY - ADULT  Goal: Absence of urinary retention  Description: INTERVENTIONS:  - Assess patients ability to void and empty bladder  - Monitor I/O  - Bladder scan as needed  - Discuss with physician/AP medications to alleviate retention as needed  - Discuss catheterization for long term situations as appropriate  Outcome: Progressing     Outcome: Progressing  Goal: Incision(s), wounds(s) or drain site(s) healing without S/S of infection  Description: INTERVENTIONS  - Assess and document dressing, incision, wound bed, drain sites and surrounding tissue  - Provide patient and family education  Outcome: Progressing    Problem: MUSCULOSKELETAL - ADULT  Goal: Maintain or return mobility to safest level of function  Description: INTERVENTIONS:  - Assess patient's ability to carry out ADLs; assess patient's baseline for ADL function and identify physical deficits which impact ability to perform ADLs (bathing, care of mouth/teeth, toileting, grooming, dressing, etc )  - Assess/evaluate cause of self-care deficits   - Assess range of motion  - Assess patient's mobility  - Assess patient's need for assistive devices and provide as appropriate  - Encourage maximum independence but intervene and supervise when necessary  - Involve family in performance of ADLs  - Assess for home care needs following discharge   - Consider OT consult to assist with ADL evaluation and planning for discharge  - Provide patient education as appropriate  Outcome: Progressing  Goal: Maintain proper alignment of affected body part  Description: INTERVENTIONS:  - Support, maintain and protect limb and body alignment  - Provide patient/ family with appropriate education  Outcome: Progressing     Problem: Potential for Falls  Goal: Patient will remain free of falls  Description: INTERVENTIONS:  - Educate patient/family on patient safety including physical limitations  - Instruct patient to call for assistance with activity   - Consult OT/PT to assist with strengthening/mobility   - Keep Call bell within reach  - Keep bed low and locked with side rails adjusted as appropriate  - Keep care items and personal belongings within reach  - Initiate and maintain comfort rounds  - Make Fall Risk Sign visible to staff     Problem: MOBILITY - ADULT  Goal: Maintain or return to baseline ADL function  Description: INTERVENTIONS:  -  Assess patient's ability to carry out ADLs; assess patient's baseline for ADL function and identify physical deficits which impact ability to perform ADLs (bathing, care of mouth/teeth, toileting, grooming, dressing, etc )  - Assess/evaluate cause of self-care deficits   - Assess range of motion  - Assess patient's mobility; develop plan if impaired  - Assess patient's need for assistive devices and provide as appropriate  - Encourage maximum independence but intervene and supervise when necessary  - Involve family in performance of ADLs  - Assess for home care needs following discharge   - Consider OT consult to assist with ADL evaluation and planning for discharge  - Provide patient education as appropriate  Outcome: Progressing  Goal: Maintains/Returns to pre admission functional level  Description: INTERVENTIONS:  - Perform BMAT or MOVE assessment daily    - Set and communicate daily mobility goal to care team and patient/family/caregiver  - Collaborate with rehabilitation services on mobility goals if consulted  - Perform Range of Motion 3 times a day  - Reposition patient every 2 hours    - Dangle patient 3 times a day  - Stand patient 3 times a day  - Ambulate patient 3 times a day  - Out of bed to chair 3 times a day   - Out of bed for meals 3 times a day  - Out of bed for toileting  - Record patient progress and toleration of activity level   Outcome: Progressing     Problem: GENITOURINARY - ADULT  Goal: Maintains or returns to baseline urinary function  Description: INTERVENTIONS:  - Assess urinary function  - Encourage oral fluids to ensure adequate hydration if ordered  - Administer IV fluids as ordered to ensure adequate hydration  - Administer ordered medications as needed  - Offer frequent toileting  - Follow urinary retention protocol if ordered  Outcome: Progressing  Goal: Absence of urinary retention  Description: INTERVENTIONS:  - Assess patients ability to void and empty bladder  - Monitor I/O  - Bladder scan as needed  - Discuss with physician/AP medications to alleviate retention as needed  - Discuss catheterization for long term situations as appropriate  Outcome: Progressing  Goal: Urinary catheter remains patent  Description: INTERVENTIONS:  - Assess patency of urinary catheter  - If patient has a chronic morales, consider changing catheter if non-functioning  - Follow guidelines for intermittent irrigation of non-functioning urinary catheter  Outcome: Progressing     Problem: METABOLIC, FLUID AND ELECTROLYTES - ADULT  Goal: Electrolytes maintained within normal limits  Description: INTERVENTIONS:  - Monitor labs and assess patient for signs and symptoms of electrolyte imbalances  - Administer electrolyte replacement as ordered  - Monitor response to electrolyte replacements, including repeat lab results as appropriate  - Instruct patient on fluid and nutrition as appropriate  Outcome: Progressing  Goal: Fluid balance maintained  Description: INTERVENTIONS:  - Monitor labs   - Monitor I/O and WT  - Instruct patient on fluid and nutrition as appropriate  - Assess for signs & symptoms of volume excess or deficit  Outcome: Progressing  Goal: Glucose maintained within target range  Description: INTERVENTIONS:  - Monitor Blood Glucose as ordered  - Assess for signs and symptoms of hyperglycemia and hypoglycemia  - Administer ordered medications to maintain glucose within target range  - Assess nutritional intake and initiate nutrition service referral as needed  Outcome: Progressing     Problem: SKIN/TISSUE INTEGRITY - ADULT    Bed Management:  -Have minimal linens on bed & keep smooth, unwrinkled  -Change linens as needed when moist or perspiring  -Avoid sitting or lying in one position for more than  hours while in bed  -Keep HOB at degrees     Toileting:  -Offer bedside commode  -Assess for incontinence every   -Use incontinent care products after each incontinent episode such as     Activity:  -Mobilize patient  times a day  -Encourage activity and walks on unit  -Encourage or provide ROM exercises   -Turn and reposition patient every  Hours  -Use appropriate equipment to lift or move patient in bed  -Instruct/ Assist with weight shifting every  when out of bed in chair  -Consider limitation of chair time  hour intervals    Skin Care:  -Avoid use of baby powder, tape, friction and shearing, hot water or constrictive clothing  -Relieve pressure over bony prominences using   -Do not massage red bony areas    Next Steps:  -Teach patient strategies to minimize risks such as    -Consider consults to  interdisciplinary teams such as   Outcome: Progressing  Goal: Incision(s), wounds(s) or drain site(s) healing without S/S of infection  Description: INTERVENTIONS  - Assess and document dressing, incision, wound bed, drain sites and surrounding tissue  - Provide patient and family education  - Perform skin care/dressing changes every   Outcome: Progressing  Goal: Pressure injury heals and does not worsen  Description: Interventions:  - Implement low air loss mattress or specialty surface (Criteria met)  - Apply silicone foam dressing  - Instruct/assist with weight shifting every  minutes when in chair   - Limit chair time to  hour intervals  - Use special pressure reducing interventions such as  when in chair   - Apply fecal or urinary incontinence containment device   - Perform passive or active ROM every   - Turn and reposition patient & offload bony prominences every  hours   - Utilize friction reducing device or surface for transfers   - Consider consults to  interdisciplinary teams such as   - Use incontinent care products after each incontinent episode such as   - Consider nutrition services referral as needed  Outcome: Progressing     Problem: MUSCULOSKELETAL - ADULT  Goal: Maintain or return mobility to safest level of function  Description: INTERVENTIONS:  - Assess patient's ability to carry out ADLs; assess patient's baseline for ADL function and identify physical deficits which impact ability to perform ADLs (bathing, care of mouth/teeth, toileting, grooming, dressing, etc )  - Assess/evaluate cause of self-care deficits   - Assess range of motion  - Assess patient's mobility  - Assess patient's need for assistive devices and provide as appropriate  - Encourage maximum independence but intervene and supervise when necessary  - Involve family in performance of ADLs  - Assess for home care needs following discharge   - Consider OT consult to assist with ADL evaluation and planning for discharge  - Provide patient education as appropriate  Outcome: Progressing  Goal: Maintain proper alignment of affected body part  Description: INTERVENTIONS:  - Support, maintain and protect limb and body alignment  - Provide patient/ family with appropriate education  Outcome: Progressing     Problem: Potential for Falls  Goal: Patient will remain free of falls  Description: INTERVENTIONS:  - Educate patient/family on patient safety including physical limitations  - Instruct patient to call for assistance with activity   - Consult OT/PT to assist with strengthening/mobility   - Keep Call bell within reach  - Keep bed low and locked with side rails adjusted as appropriate  - Keep care items and personal belongings within reach  - Initiate and maintain comfort rounds  - Make Fall Risk Sign visible to staff  - Offer Toileting every  Hours, in advance of need  - Initiate/Maintain alarm  - Obtain necessary fall risk management equipment:   - Apply yellow socks and bracelet for high fall risk patients  - Consider moving patient to room near nurses station  Outcome: Progressing     Problem: MOBILITY - ADULT  Goal: Maintain or return to baseline ADL function  Description: INTERVENTIONS:  -  Assess patient's ability to carry out ADLs; assess patient's baseline for ADL function and identify physical deficits which impact ability to perform ADLs (bathing, care of mouth/teeth, toileting, grooming, dressing, etc )  - Assess/evaluate cause of self-care deficits   - Assess range of motion  - Assess patient's mobility; develop plan if impaired  - Assess patient's need for assistive devices and provide as appropriate  - Encourage maximum independence but intervene and supervise when necessary  - Involve family in performance of ADLs  - Assess for home care needs following discharge   - Consider OT consult to assist with ADL evaluation and planning for discharge  - Provide patient education as appropriate  Outcome: Progressing  Goal: Maintains/Returns to pre admission functional level  Description: INTERVENTIONS:  - Perform BMAT or MOVE assessment daily    - Set and communicate daily mobility goal to care team and patient/family/caregiver  - Collaborate with rehabilitation services on mobility goals if consulted  - Perform Range of Motion times a day  - Reposition patient every 2 hours    - Dangle patient 3 times a day  - Stand patient 3 times a day  - Ambulate patient 3 times a day  - Out of bed to chair 3 times a day   - Out of bed for meals 3 times a day  - Out of bed for toileting  - Record patient progress and toleration of activity level   Outcome: Progressing

## 2022-02-14 NOTE — PROGRESS NOTES
NEPHROLOGY PROGRESS NOTE   Stanton Mandel 63 y o  female MRN: [id number]  Unit/Bed#: E5 -01 Encounter: [id number]  Reason for Consult: ALFRED      SUMMARY:    61-year-old female with past history of progressive Chronic Kidney Disease IV, uncontrolled diabetes, systolic/diastolic CHF, hypertension who presented with worsening shortness of breath, leg edema and weight gain   Nephrology consulted for acute kidney injury on top of Chronic Kidney Disease    ASSESSMENT and PLAN:    Acute kidney injury  --renal function has now stabilized  --admission creatinine 3 1 mg/dL  --creatinine now plateaued in the high 2s to low threes  --will likely need to accept a higher baseline creatinine to keep out of volume overload and congestive heart failure  --she has underlying chronic kidney disease and have poor renal reserve  --nonoliguric, chronic suprapubic catheter  --continue furosemide 60 mg IV b i d , aim for net negative fluid balance  --creatinine stable at 2 9 mg/dL today    Chronic kidney disease stage IV  --outpatient nephrologist Dr Vandana Jones  --likely be at a new baseline creatinine, historically had been in the mid 2s  --suspect she will need to be in the high 2s to low threes to maintain close to euvolemic status    Acute on chronic combined congestive heart failure  --combined with cardiorenal  --echocardiogram shows ejection fraction 45% with grade 2 diastolic dysfunction  --volume status improving  --continue IV Lasix 60 mg b i d  Which was recently increased  --clinically feeling better  --look to transition to oral diuretics next 24-48 hours    Anemia  --chronic  --hemoglobin stable below goal    Hypertension  --volume mediated  --controlled      SUBJECTIVE / INTERVAL HISTORY:    Saturating well on room air  No shortness of breath at rest mild on exertion  Swelling is much improved and has been better than and has recently    Tolerating oral intake well    OBJECTIVE:  Current Weight: Weight - Scale: 135 kg (297 lb 9 9 oz)  Vitals:    02/14/22 0255 02/14/22 0600 02/14/22 0654 02/14/22 0716   BP: 134/58  126/53 129/55   Pulse: 73  60 58   Resp:    19   Temp: 97 7 °F (36 5 °C)   (!) 97 3 °F (36 3 °C)   TempSrc:       SpO2: 93%  97% 96%   Weight:  135 kg (297 lb 9 9 oz)     Height:           Intake/Output Summary (Last 24 hours) at 2/14/2022 1222  Last data filed at 2/13/2022 2001  Gross per 24 hour   Intake 540 ml   Output 1400 ml   Net -860 ml       Review of Systems:    12 point ROS has been reviewed  Physical Exam  Vitals and nursing note reviewed  Constitutional:       General: She is not in acute distress  Appearance: She is well-developed  She is obese  HENT:      Head: Normocephalic and atraumatic  Eyes:      General: No scleral icterus  Conjunctiva/sclera: Conjunctivae normal       Pupils: Pupils are equal, round, and reactive to light  Cardiovascular:      Rate and Rhythm: Normal rate and regular rhythm  Heart sounds: S1 normal and S2 normal  No murmur heard  No friction rub  No gallop  Pulmonary:      Effort: Pulmonary effort is normal  No respiratory distress  Breath sounds: Normal breath sounds  No wheezing or rales  Abdominal:      General: Bowel sounds are normal       Palpations: Abdomen is soft  Tenderness: There is no abdominal tenderness  There is no rebound  Musculoskeletal:         General: Normal range of motion  Cervical back: Normal range of motion and neck supple  Right lower leg: Edema present  Left lower leg: Edema present  Skin:     Findings: No rash  Neurological:      Mental Status: She is alert and oriented to person, place, and time     Psychiatric:         Behavior: Behavior normal          Medications:    Current Facility-Administered Medications:     acetaminophen (TYLENOL) tablet 650 mg, 650 mg, Oral, Q4H PRN, Ifeoma Ruelas PA-C    allopurinol (ZYLOPRIM) tablet 100 mg, 100 mg, Oral, Daily, Della Valverde MD, 100 mg at 02/14/22 0824    aspirin (ECOTRIN LOW STRENGTH) EC tablet 81 mg, 81 mg, Oral, Daily, Mary Eller MD, 81 mg at 02/14/22 0824    atorvastatin (LIPITOR) tablet 40 mg, 40 mg, Oral, After Chanell Corbett MD, 40 mg at 02/13/22 1739    citalopram (CeleXA) tablet 40 mg, 40 mg, Oral, Daily, Mary Eller MD, 40 mg at 02/14/22 6182    clopidogrel (PLAVIX) tablet 75 mg, 75 mg, Oral, Daily, Mary Eller MD, 75 mg at 02/14/22 0853    ferrous sulfate tablet 325 mg, 325 mg, Oral, Daily With Breakfast, CHERELLE Price, 325 mg at 02/14/22 2450    furosemide (LASIX) injection 60 mg, 60 mg, Intravenous, BID (diuretic), Ema Henson DO, 60 mg at 02/14/22 0825    gabapentin (NEURONTIN) capsule 300 mg, 300 mg, Oral, BID, Mary Eller MD, 300 mg at 02/14/22 4728    heparin (porcine) subcutaneous injection 5,000 Units, 5,000 Units, Subcutaneous, Q8H [address], 5,000 Units at 02/14/22 0529 **AND** [COMPLETED] Platelet count, , , Once, Mary Eller MD    HYDROcodone-acetaminophen [facility]) 5-325 mg per tablet 1 tablet, 1 tablet, Oral, Q8H PRN, Christiano Law PA-C, 1 tablet at 02/13/22 0916    insulin glargine (LANTUS) subcutaneous injection 35 Units 0 35 mL, 35 Units, Subcutaneous, HS, Ainsley Hatchet, MD    insulin lispro (HumaLOG) 100 units/mL subcutaneous injection 15 Units, 15 Units, Subcutaneous, TID With Meals, Mary Eller MD, 15 Units at 02/14/22 1201    insulin lispro (HumaLOG) 100 units/mL subcutaneous injection 2-12 Units, 2-12 Units, Subcutaneous, TID AC, 6 Units at 02/14/22 1200 **AND** Fingerstick Glucose (POCT), , , TID AC, Mary Eller MD    isosorbide mononitrate (IMDUR) 24 hr tablet 60 mg, 60 mg, Oral, Daily, Mary Eller MD, 60 mg at 02/14/22 8221    levothyroxine tablet 50 mcg, 50 mcg, Oral, Daily, Mary Eller MD, 50 mcg at 02/14/22 0529    OLANZapine (ZyPREXA) tablet 5 mg, 5 mg, Oral, HS, Bree Staples MD, 5 mg at 02/13/22 2255    pantoprazole (PROTONIX) EC tablet 40 mg, 40 mg, Oral, BID, Bree Staples MD, 40 mg at 02/14/22 0658    sodium chloride (OCEAN) 0 65 % nasal spray 1 spray, 1 spray, Each Nare, Q1H PRN, Karthikeyan Rey MD, 1 spray at 02/13/22 0921    tiZANidine (ZANAFLEX) tablet 2 mg, 2 mg, Oral, Q8H PRN, Bree Staples MD, 2 mg at 02/12/22 5674    Laboratory Results:  Results from last 7 days   Lab Units 02/14/22  0523 02/13/22  0537 02/12/22  0625 02/11/22  0633 02/10/22  1817 02/10/22  1324   WBC Thousand/uL 5 72 7 04 7 11 7 79  --  11 06*   HEMOGLOBIN g/dL 7 8* 8 2* 8 0* 7 6*  --  9 1*   HEMATOCRIT % 25 1* 25 5* 24 6* 23 6*  --  28 1*   PLATELETS Thousands/uL 167 204 192 186 219 227   POTASSIUM mmol/L 3 8 3 9 3 8 3 8  --  4 5   CHLORIDE mmol/L 103 106 108 108  --  107   CO2 mmol/L 26 26 24 25  --  21   BUN mg/dL 95* 90* 82* 80*  --  84*   CREATININE mg/dL 2 94* 2 93* 2 86* 3 07*  --  3 13*   CALCIUM mg/dL 8 8 8 5 8 7 8 3  --  8 1*   MAGNESIUM mg/dL 2 0  --  1 9  --   --   --

## 2022-02-14 NOTE — PROGRESS NOTES
[address] [facility]  Progress Note - Olena Rios 1960, 63 y o  female MRN: [id number]  Unit/Bed#: E5 -01 Encounter: [id number]  Primary Care Provider: CHERELLE Gregorio   Date and time admitted to hospital: 2/10/2022 12:46 PM    * 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 heart failure ([address] )  Assessment & Plan  Wt Readings from Last 3 Encounters:   02/14/22 135 kg (297 lb 9 9 oz)   02/10/22 (!) 137 kg (302 lb)   12/16/21 125 kg (276 lb 4 8 oz)     This is a 57-year-old female with history of CKD stage 4, depression, diabetes mellitus, hypertension, hyperlipidemia, CHF presented with progressive worsening dyspnea and lower extremity edema for the past 2 weeks  · NT proBNP 2094, chest x-ray with trace left pleural effusion, significant lower extremity edema  · -4L since admission  · Continue with furosemide IV 60 mg b i d   · Continue aspirin, Plavix, statin, Imdur   · Monitor I and O, daily weight, fluid restriction    Elevated troponin  Assessment & Plan  · Non MI troponin elevation likely secondary to CHF exacerbation and CKD stage 4  · Cardiology input appreciated    Volume overload  Assessment & Plan  · 20 lb weight gain, dyspnea, vascular congestion on imaging and peripheral edema secondary to both congestive heart failure and renal failure    · See plan for CHF/acute kidney injury below    Acute renal failure superimposed on stage 4 chronic kidney disease (HCC)  Assessment & Plan  · Acute renal failure on CKD stage 4  · Baseline creatinine in the mid 2s  · Creatinine 3 1 on admission, improved to 2 94  · Acute kidney injury likely secondary to cardiorenal and volume overload  · Nephrology consultation appreciated  · Urinalysis, bladder scan  · Continue with IV diuretics  · Closely monitor renal function    Anemia  Assessment & Plan  · Baseline hemoglobin 8-9  · Hemoglobin today 7 8  · No evidence of active bleeding  · Possibly secondary to CKD  · Will check iron panel, B12, folate  · Monitor H&H, transfuse if less than 7 5    CAD (coronary artery disease)  Assessment & Plan  · History of multiple stents to the LAD and recent stenting to the RCA and 2021  · Continue dual anti-platelet with aspirin and Plavix, statin, Imdur or  · Hold Coreg due to bradycardia and compensated CHF  Type 2 diabetes mellitus with stage 4 chronic kidney disease, with long-term current use of insulin [facility])  Assessment & Plan  Lab Results   Component Value Date    HGBA1C 8 5 (A) 2021     · At home patient takes Lantus 50 units b i d  And Humalog 15 units t i d  With meals  · Patient was started on Lantus 50 on admission, will will increase and divided doses to 35 b i d  While inpatient and on monitored diet  · Continue Humalog 15 units t i d  With meals  · Monitor Accu-Cheks, sliding scale for coverage        VTE Pharmacologic Prophylaxis:   Pharmacologic: heparin    Patient Centered Rounds: I have performed bedside rounds with nursing staff today  Education and Discussions with Family / Patient: Updated daughter-in-law Priya    Time Spent for Care: 20 minutes  More than 50% of total time spent on counseling and coordination of care as described above  Current Length of Stay: 4 day(s)    Current Patient Status: Inpatient   Certification Statement: The patient will continue to require additional inpatient hospital stay due to IV diuretics    Discharge Plan / Estimated Discharge Date: TBD    Code Status: Level 1 - Full Code      Subjective:   Patient seen and examined at bedside, denies any dyspnea or chest pain    Objective:     Vitals:   Temp (24hrs), Av 8 °F (36 6 °C), Min:97 3 °F (36 3 °C), Max:98 2 °F (36 8 °C)    Temp:  [97 3 °F (36 3 °C)-98 2 °F (36 8 °C)] 97 3 °F (36 3 °C)  HR:  [58-73] 58  Resp:  [12-19] 19  BP: (104-161)/(53-77) 129/55  SpO2:  [93 %-97 %] 96 %  Body mass index is 45 25 kg/m²       Input and Output Summary (last 24 hours): Intake/Output Summary (Last 24 hours) at 2/14/2022 1211  Last data filed at 2/13/2022 2001  Gross per 24 hour   Intake 540 ml   Output 1400 ml   Net -860 ml       Physical Exam:    Constitutional: Patient is oriented to person, place and time, no acute distress  HEENT:  Normocephalic, atraumatic  Cardiovascular: Normal S1S2, RRR, No murmurs/rubs/gallops appreciated  Pulmonary:  Bilateral air entry, No rhonchi/rales/wheezing appreciated  Abdominal: Soft, Bowel sounds present, Non-tender, Non-distended  Extremities:  Bilateral lower extremity edema improving  Neurological: Cranial nerves II-XII grossly intact, sensation intact, otherwise no focal neurological symptoms  Skin:  Warm, dry    Additional Data:     Labs:    Results from last 7 days   Lab Units 02/14/22  0523   WBC Thousand/uL 5 72   HEMOGLOBIN g/dL 7 8*   HEMATOCRIT % 25 1*   PLATELETS Thousands/uL 167   NEUTROS PCT % 63   LYMPHS PCT % 25   MONOS PCT % 7   EOS PCT % 4     Results from last 7 days   Lab Units 02/14/22  0523 02/11/22  0633 02/10/22  1324   POTASSIUM mmol/L 3 8   < > 4 5   CHLORIDE mmol/L 103   < > 107   CO2 mmol/L 26   < > 21   BUN mg/dL 95*   < > 84*   CREATININE mg/dL 2 94*   < > 3 13*   CALCIUM mg/dL 8 8   < > 8 1*   ALK PHOS U/L  --   --  109   ALT U/L  --   --  19   AST U/L  --   --  17    < > = values in this interval not displayed  I Have Reviewed All Lab Data Listed Above          Recent Cultures (last 7 days):           Last 24 Hours Medication List:   Current Facility-Administered Medications   Medication Dose Route Frequency Provider Last Rate    acetaminophen  650 mg Oral Q4H PRN Matti Ledbetter PA-C      allopurinol  100 mg Oral Daily Ottoniel Webster MD      aspirin  81 mg Oral Daily Ottoniel Webster MD      atorvastatin  40 mg Oral After Dru Flores MD      citalopram  40 mg Oral Daily Ottoniel Webster MD      clopidogrel  75 mg Oral Daily Kelle Palomares Lianna Torres MD      ferrous sulfate  325 mg Oral Daily With Breakfast CHERELLE Maynard      furosemide  60 mg Intravenous BID (diuretic) Earl Russo DO      gabapentin  300 mg Oral BID Jeanne Treviño MD      heparin (porcine)  5,000 Units Subcutaneous [facility] Jeanne Treviño MD      HYDROcodone-acetaminophen  1 tablet Oral Q8H PRN Devon Benito PA-C      insulin glargine  35 Units Subcutaneous HS Nadiya Del Toro MD      insulin lispro  15 Units Subcutaneous TID With Meals Jeanne Treviño MD      insulin lispro  2-12 Units Subcutaneous TID [facility] Jeanne Treviño MD      isosorbide mononitrate  60 mg Oral Daily Jeanne Treviño MD      levothyroxine  50 mcg Oral Daily Jeanne Treviño MD      OLANZapine  5 mg Oral HS Jeanne Treviño MD      pantoprazole  40 mg Oral BID Jeanne Treviño MD      sodium chloride  1 spray Each Nare Q1H PRN Nadiya Del Toro MD      tiZANidine  2 mg Oral Q8H PRN Jeanne Treviño MD          Today, Patient Was Seen By: Nadiya Del Toro MD

## 2022-02-14 NOTE — ASSESSMENT & PLAN NOTE
Wt Readings from Last 3 Encounters:   02/14/22 135 kg (297 lb 9 9 oz)   02/10/22 (!) 137 kg (302 lb)   12/16/21 125 kg (276 lb 4 8 oz)     This is a 56-year-old female with history of CKD stage 4, depression, diabetes mellitus, hypertension, hyperlipidemia, CHF presented with progressive worsening dyspnea and lower extremity edema for the past 2 weeks      · NT proBNP 2094, chest x-ray with trace left pleural effusion, significant lower extremity edema  · -4L since admission  · Continue with furosemide IV 60 mg b i d   · Continue aspirin, Plavix, statin, Imdur   · Monitor I and O, daily weight, fluid restriction

## 2022-02-14 NOTE — ASSESSMENT & PLAN NOTE
Lab Results   Component Value Date    HGBA1C 8 5 (A) 12/16/2021     · At home patient takes Lantus 50 units b i d  And Humalog 15 units t i d  With meals  · Patient was started on Lantus 50 on admission, will will increase and divided doses to 35 b i d  While inpatient and on monitored diet  · Continue Humalog 15 units t i d   With meals  · Monitor Accu-Cheks, sliding scale for coverage

## 2022-02-15 PROBLEM — R79.89 ELEVATED TROPONIN: Status: RESOLVED | Noted: 2022-02-11 | Resolved: 2022-02-15

## 2022-02-15 PROBLEM — R77.8 ELEVATED TROPONIN: Status: RESOLVED | Noted: 2022-02-11 | Resolved: 2022-02-15

## 2022-02-15 LAB
ANION GAP SERPL CALCULATED.3IONS-SCNC: 9 MMOL/L (ref 4–13)
BASOPHILS # BLD AUTO: 0.03 THOUSANDS/ΜL (ref 0–0.1)
BASOPHILS NFR BLD AUTO: 0 % (ref 0–1)
BUN SERPL-MCNC: 88 MG/DL (ref 5–25)
CALCIUM SERPL-MCNC: 8.8 MG/DL (ref 8.3–10.1)
CHLORIDE SERPL-SCNC: 103 MMOL/L (ref 100–108)
CO2 SERPL-SCNC: 28 MMOL/L (ref 21–32)
CREAT SERPL-MCNC: 2.79 MG/DL (ref 0.6–1.3)
EOSINOPHIL # BLD AUTO: 0.31 THOUSAND/ΜL (ref 0–0.61)
EOSINOPHIL NFR BLD AUTO: 4 % (ref 0–6)
ERYTHROCYTE [DISTWIDTH] IN BLOOD BY AUTOMATED COUNT: 15.3 % (ref 11.6–15.1)
FERRITIN SERPL-MCNC: 51 NG/ML (ref 8–388)
FOLATE SERPL-MCNC: >20 NG/ML (ref 3.1–17.5)
GFR SERPL CREATININE-BSD FRML MDRD: 17 ML/MIN/1.73SQ M
GLUCOSE SERPL-MCNC: 177 MG/DL (ref 65–140)
GLUCOSE SERPL-MCNC: 179 MG/DL (ref 65–140)
GLUCOSE SERPL-MCNC: 231 MG/DL (ref 65–140)
GLUCOSE SERPL-MCNC: 234 MG/DL (ref 65–140)
GLUCOSE SERPL-MCNC: 238 MG/DL (ref 65–140)
HCT VFR BLD AUTO: 27.1 % (ref 34.8–46.1)
HGB BLD-MCNC: 8.8 G/DL (ref 11.5–15.4)
IMM GRANULOCYTES # BLD AUTO: 0.03 THOUSAND/UL (ref 0–0.2)
IMM GRANULOCYTES NFR BLD AUTO: 0 % (ref 0–2)
IRON SATN MFR SERPL: 23 % (ref 15–50)
IRON SERPL-MCNC: 54 UG/DL (ref 50–170)
LYMPHOCYTES # BLD AUTO: 1.93 THOUSANDS/ΜL (ref 0.6–4.47)
LYMPHOCYTES NFR BLD AUTO: 26 % (ref 14–44)
MCH RBC QN AUTO: 31.4 PG (ref 26.8–34.3)
MCHC RBC AUTO-ENTMCNC: 32.5 G/DL (ref 31.4–37.4)
MCV RBC AUTO: 97 FL (ref 82–98)
MONOCYTES # BLD AUTO: 0.68 THOUSAND/ΜL (ref 0.17–1.22)
MONOCYTES NFR BLD AUTO: 9 % (ref 4–12)
NEUTROPHILS # BLD AUTO: 4.36 THOUSANDS/ΜL (ref 1.85–7.62)
NEUTS SEG NFR BLD AUTO: 61 % (ref 43–75)
NRBC BLD AUTO-RTO: 0 /100 WBCS
PLATELET # BLD AUTO: 225 THOUSANDS/UL (ref 149–390)
PMV BLD AUTO: 9.4 FL (ref 8.9–12.7)
POTASSIUM SERPL-SCNC: 3.3 MMOL/L (ref 3.5–5.3)
RBC # BLD AUTO: 2.8 MILLION/UL (ref 3.81–5.12)
SODIUM SERPL-SCNC: 140 MMOL/L (ref 136–145)
TIBC SERPL-MCNC: 234 UG/DL (ref 250–450)
VIT B12 SERPL-MCNC: 400 PG/ML (ref 100–900)
WBC # BLD AUTO: 7.34 THOUSAND/UL (ref 4.31–10.16)

## 2022-02-15 PROCEDURE — 82948 REAGENT STRIP/BLOOD GLUCOSE: CPT

## 2022-02-15 PROCEDURE — 83550 IRON BINDING TEST: CPT | Performed by: INTERNAL MEDICINE

## 2022-02-15 PROCEDURE — 85025 COMPLETE CBC W/AUTO DIFF WBC: CPT | Performed by: INTERNAL MEDICINE

## 2022-02-15 PROCEDURE — 99232 SBSQ HOSP IP/OBS MODERATE 35: CPT | Performed by: INTERNAL MEDICINE

## 2022-02-15 PROCEDURE — 83540 ASSAY OF IRON: CPT | Performed by: INTERNAL MEDICINE

## 2022-02-15 PROCEDURE — 99233 SBSQ HOSP IP/OBS HIGH 50: CPT | Performed by: STUDENT IN AN ORGANIZED HEALTH CARE EDUCATION/TRAINING PROGRAM

## 2022-02-15 PROCEDURE — 82607 VITAMIN B-12: CPT | Performed by: INTERNAL MEDICINE

## 2022-02-15 PROCEDURE — 82746 ASSAY OF FOLIC ACID SERUM: CPT | Performed by: INTERNAL MEDICINE

## 2022-02-15 PROCEDURE — 80048 BASIC METABOLIC PNL TOTAL CA: CPT | Performed by: INTERNAL MEDICINE

## 2022-02-15 PROCEDURE — 82728 ASSAY OF FERRITIN: CPT | Performed by: INTERNAL MEDICINE

## 2022-02-15 RX ORDER — POTASSIUM CHLORIDE 20 MEQ/1
40 TABLET, EXTENDED RELEASE ORAL 2 TIMES DAILY
Status: DISCONTINUED | OUTPATIENT
Start: 2022-02-15 | End: 2022-02-16 | Stop reason: HOSPADM

## 2022-02-15 RX ORDER — TORSEMIDE 20 MG/1
40 TABLET ORAL DAILY
Status: DISCONTINUED | OUTPATIENT
Start: 2022-02-16 | End: 2022-02-16 | Stop reason: HOSPADM

## 2022-02-15 RX ADMIN — INSULIN GLARGINE 35 UNITS: 100 INJECTION, SOLUTION SUBCUTANEOUS at 22:44

## 2022-02-15 RX ADMIN — GABAPENTIN 300 MG: 300 CAPSULE ORAL at 09:06

## 2022-02-15 RX ADMIN — POTASSIUM CHLORIDE 40 MEQ: 1500 TABLET, EXTENDED RELEASE ORAL at 17:24

## 2022-02-15 RX ADMIN — INSULIN LISPRO 15 UNITS: 100 INJECTION, SOLUTION INTRAVENOUS; SUBCUTANEOUS at 16:10

## 2022-02-15 RX ADMIN — ALLOPURINOL 100 MG: 100 TABLET ORAL at 09:06

## 2022-02-15 RX ADMIN — FUROSEMIDE 60 MG: 10 INJECTION, SOLUTION INTRAMUSCULAR; INTRAVENOUS at 07:44

## 2022-02-15 RX ADMIN — HEPARIN SODIUM 5000 UNITS: 5000 INJECTION INTRAVENOUS; SUBCUTANEOUS at 22:44

## 2022-02-15 RX ADMIN — OLANZAPINE 5 MG: 5 TABLET, FILM COATED ORAL at 22:44

## 2022-02-15 RX ADMIN — CLOPIDOGREL BISULFATE 75 MG: 75 TABLET ORAL at 09:06

## 2022-02-15 RX ADMIN — HEPARIN SODIUM 5000 UNITS: 5000 INJECTION INTRAVENOUS; SUBCUTANEOUS at 05:21

## 2022-02-15 RX ADMIN — INSULIN LISPRO 2 UNITS: 100 INJECTION, SOLUTION INTRAVENOUS; SUBCUTANEOUS at 07:43

## 2022-02-15 RX ADMIN — ASPIRIN 81 MG: 81 TABLET, COATED ORAL at 09:06

## 2022-02-15 RX ADMIN — HYDROCODONE BITARTRATE AND ACETAMINOPHEN 1 TABLET: 5; 325 TABLET ORAL at 22:53

## 2022-02-15 RX ADMIN — INSULIN LISPRO 4 UNITS: 100 INJECTION, SOLUTION INTRAVENOUS; SUBCUTANEOUS at 11:37

## 2022-02-15 RX ADMIN — INSULIN LISPRO 4 UNITS: 100 INJECTION, SOLUTION INTRAVENOUS; SUBCUTANEOUS at 16:09

## 2022-02-15 RX ADMIN — FERROUS SULFATE TAB 325 MG (65 MG ELEMENTAL FE) 325 MG: 325 (65 FE) TAB at 07:44

## 2022-02-15 RX ADMIN — INSULIN LISPRO 15 UNITS: 100 INJECTION, SOLUTION INTRAVENOUS; SUBCUTANEOUS at 11:37

## 2022-02-15 RX ADMIN — ISOSORBIDE MONONITRATE 60 MG: 60 TABLET, EXTENDED RELEASE ORAL at 06:11

## 2022-02-15 RX ADMIN — INSULIN LISPRO 15 UNITS: 100 INJECTION, SOLUTION INTRAVENOUS; SUBCUTANEOUS at 07:44

## 2022-02-15 RX ADMIN — LEVOTHYROXINE SODIUM 50 MCG: 50 TABLET ORAL at 05:21

## 2022-02-15 RX ADMIN — HEPARIN SODIUM 5000 UNITS: 5000 INJECTION INTRAVENOUS; SUBCUTANEOUS at 14:58

## 2022-02-15 RX ADMIN — PANTOPRAZOLE SODIUM 40 MG: 40 TABLET, DELAYED RELEASE ORAL at 06:11

## 2022-02-15 RX ADMIN — CITALOPRAM HYDROBROMIDE 40 MG: 20 TABLET ORAL at 09:06

## 2022-02-15 RX ADMIN — ATORVASTATIN CALCIUM 40 MG: 40 TABLET, FILM COATED ORAL at 17:24

## 2022-02-15 RX ADMIN — GABAPENTIN 300 MG: 300 CAPSULE ORAL at 17:24

## 2022-02-15 RX ADMIN — PANTOPRAZOLE SODIUM 40 MG: 40 TABLET, DELAYED RELEASE ORAL at 15:00

## 2022-02-15 RX ADMIN — POTASSIUM CHLORIDE 40 MEQ: 1500 TABLET, EXTENDED RELEASE ORAL at 11:36

## 2022-02-15 NOTE — PLAN OF CARE
Problem: GENITOURINARY - ADULT  Goal: Maintains or returns to baseline urinary function  Description: INTERVENTIONS:  - Assess urinary function  - Encourage oral fluids to ensure adequate hydration if ordered  - Administer IV fluids as ordered to ensure adequate hydration  - Administer ordered medications as needed  - Offer frequent toileting  - Follow urinary retention protocol if ordered  Outcome: Progressing  Goal: Absence of urinary retention  Description: INTERVENTIONS:  - Assess patients ability to void and empty bladder  - Monitor I/O  - Bladder scan as needed  - Discuss with physician/AP medications to alleviate retention as needed  - Discuss catheterization for long term situations as appropriate  Outcome: Progressing  Goal: Urinary catheter remains patent  Description: INTERVENTIONS:  - Assess patency of urinary catheter  - If patient has a chronic morales, consider changing catheter if non-functioning  - Follow guidelines for intermittent irrigation of non-functioning urinary catheter  Outcome: Progressing     Problem: METABOLIC, FLUID AND ELECTROLYTES - ADULT  Goal: Electrolytes maintained within normal limits  Description: INTERVENTIONS:  - Monitor labs and assess patient for signs and symptoms of electrolyte imbalances  - Administer electrolyte replacement as ordered  - Monitor response to electrolyte replacements, including repeat lab results as appropriate  - Instruct patient on fluid and nutrition as appropriate  Outcome: Progressing  Goal: Fluid balance maintained  Description: INTERVENTIONS:  - Monitor labs   - Monitor I/O and WT  - Instruct patient on fluid and nutrition as appropriate  - Assess for signs & symptoms of volume excess or deficit  Outcome: Progressing  Goal: Glucose maintained within target range  Description: INTERVENTIONS:  - Monitor Blood Glucose as ordered  - Assess for signs and symptoms of hyperglycemia and hypoglycemia  - Administer ordered medications to maintain glucose within target range  - Assess nutritional intake and initiate nutrition service referral as needed  Outcome: Progressing     Problem: MOBILITY - ADULT  Goal: Maintain or return to baseline ADL function  Description: INTERVENTIONS:  -  Assess patient's ability to carry out ADLs; assess patient's baseline for ADL function and identify physical deficits which impact ability to perform ADLs (bathing, care of mouth/teeth, toileting, grooming, dressing, etc )  - Assess/evaluate cause of self-care deficits   - Assess range of motion  - Assess patient's mobility; develop plan if impaired  - Assess patient's need for assistive devices and provide as appropriate  - Encourage maximum independence but intervene and supervise when necessary  - Involve family in performance of ADLs  - Assess for home care needs following discharge   - Consider OT consult to assist with ADL evaluation and planning for discharge  - Provide patient education as appropriate  Outcome: Progressing  Goal: Maintains/Returns to pre admission functional level  Description: INTERVENTIONS:  - Perform BMAT or MOVE assessment daily    - Set and communicate daily mobility goal to care team and patient/family/caregiver     - Collaborate with rehabilitation services on mobility goals if consulted  - Out of bed for toileting  - Record patient progress and toleration of activity level   Outcome: Progressing        Problem: MUSCULOSKELETAL - ADULT  Goal: Maintain or return mobility to safest level of function  Description: INTERVENTIONS:  - Assess patient's ability to carry out ADLs; assess patient's baseline for ADL function and identify physical deficits which impact ability to perform ADLs (bathing, care of mouth/teeth, toileting, grooming, dressing, etc )  - Assess/evaluate cause of self-care deficits   - Assess range of motion  - Assess patient's mobility  - Assess patient's need for assistive devices and provide as appropriate  - Encourage maximum independence but intervene and supervise when necessary  - Involve family in performance of ADLs  - Assess for home care needs following discharge   - Consider OT consult to assist with ADL evaluation and planning for discharge  - Provide patient education as appropriate  Outcome: Progressing  Goal: Maintain proper alignment of affected body part  Description: INTERVENTIONS:  - Support, maintain and protect limb and body alignment  - Provide patient/ family with appropriate education  Outcome: Progressing

## 2022-02-15 NOTE — ASSESSMENT & PLAN NOTE
· Non chest pain associated elevation in troponin secondary to chf exacerbation and joao  · No further intervention currently

## 2022-02-15 NOTE — UTILIZATION REVIEW
Inpatient Admission Authorization Request   NOTIFICATION OF INPATIENT ADMISSION/INPATIENT AUTHORIZATION REQUEST   SERVICING FACILITY:   [address] [place] [address], [facility], [facility]  Tax ID: [id number]  NPI: [id number]  Place of Service: Inpatient [address] [facility]  [address]  Place of Service Code: 24     ATTENDING PROVIDER:  Attending Name and NPI#: Zuly Prieto [[id number]]  Address: [address] [place] [address], [facility], [facility]  Phone: [phone]     UTILIZATION REVIEW CONTACT:  Vinita Preciado, Utilization   Network Utilization Review Department  Phone: [phone]  Fax: [phone]  Email: [email or web address]     PHYSICIAN ADVISORY SERVICES:  FOR UMYL-TV-YFTL REVIEW - MEDICAL NECESSITY DENIAL  Phone: [phone]  Fax: [phone]  Email: [email or web address]     TYPE OF REQUEST:  Inpatient Status     ADMISSION INFORMATION:  ADMISSION DATE/TIME: 2/10/22  2:43 PM  PATIENT DIAGNOSIS CODE/DESCRIPTION:  CHF (congestive heart failure) (HCC) [I50 9]  Chest pain [R07 9]  Volume overload [E87 70]  Acute kidney injury superimposed on chronic kidney disease ([facility] ) [N17 9, N18 9]  DISCHARGE DATE/TIME: No discharge date for patient encounter  DISCHARGE DISPOSITION (IF DISCHARGED): Home with Home Health Care     IMPORTANT INFORMATION:  Please contact the Vinita Preciado directly with any questions or concerns regarding this request  Department voicemails are confidential     Send requests for admission clinical reviews, concurrent reviews, approvals, and administrative denials due to lack of clinical to fax [phone]         Initial Clinical Review     Admission: Date/Time/Statement:       Admission Orders (From admission, onward)              Ordered          02/10/22 1443   Inpatient Admission  Once                                Orders Placed This Encounter   Procedures    Inpatient Admission       Standing Status:   Standing       Number of Occurrences:   1       Order Specific Question:   Level of Care       Answer:   Med Surg [16]       Order Specific Question:   Estimated length of stay       Answer:   More than 2 Midnights       Order Specific Question:   Certification       Answer:   I certify that inpatient services are medically necessary for this patient for a duration of greater than two midnights  See H&P and MD Progress Notes for additional information about the patient's course of treatment             ED Arrival Information      Expected Arrival Acuity     - 2/10/2022 12:46 Urgent           Means of arrival Escorted by Service Admission type     Ambulance [facility] ([address]) Hospitalist Urgent           Arrival complaint     Chf               Chief Complaint   Patient presents with    Shortness of Breath       pt with hx of CHF  comes from office after 20lb weight gain  pt with catheter and also noticed decreased urine output            Initial Presentation: 63 Y O female presents to ED via  EMS from home with shortness of breath, swelling and 20 lb weight gain in past 2 weeks  Has shortness of breath with minimal exertion such as walking around her house or  Going up stairs  Shortness of breath improves with rest   Has noticed worsening swelling of both legs up to thighs with reddish discoloration  Has noticed torsemide not effective, not urinating much  Needs  2 pillows at night due to difficulty sleeping flat  Compliant with meds  Saw PCP the day of admission and ED recommended  Due to volume overload on exam  PMH  Is chronic combined CHF,  CAD, S/P multiple stents,  and CKD  Stage 4  Admit  Ip with Volume Overload, Acute/chronic CHF and ARF on CKD  and plan is  IV lasix  BID, monitor labs, tele, hold coreg due to bradycardia, cardiology  Consult, possibly nephrology consult,  Continue other home meds           Date:     2/11   Day 2:   Cardiology consult  Appears to be in  Decompensated heart failure  Has bilateral lower extremity pitting edema  3 +  Continue IV lasix  BID  Troponin elevation is non MI  Monitor urine output, I & O, daily weight  Recommending nephrology consult             ED Triage Vitals   Temperature Pulse Respirations Blood Pressure SpO2   02/10/22 1255 02/10/22 1255 02/10/22 1255 02/10/22 1255 02/10/22 1255   98 3 °F (36 8 °C) 56 16 130/60 96 %       Temp Source Heart Rate Source Patient Position - Orthostatic VS BP Location FiO2 (%)   02/10/22 1255 02/10/22 1539 02/10/22 1759 02/10/22 1759 --   Oral Monitor Lying Left arm         Pain Score           02/10/22 1539           7                  Wt Readings from Last 1 Encounters:   02/11/22 135 kg (296 lb 11 8 oz)      Additional Vital Signs:   -- -- -- -- -- -- None (Room air) --     02/11/22 07:14:37 97 8 °F (36 6 °C) 62 -- 129/56 80 95 % -- --   02/10/22 23:31:38 97 9 °F (36 6 °C) -- 18 139/71 94 -- -- --   02/10/22 20:08:16 97 7 °F (36 5 °C) 69 18 159/78 105 97 % None (Room air) --   02/10/22 1759 -- 68 18 150/65 -- 98 % None (Room air) Lying   02/10/22 1539 -- 69 -- 155/68 -- 96 % None (Room air) --   02/10/22 1255 98 3 °F (36 8 °C) 56 16 130/60 -- 96 % None (Room air) --         Pertinent Labs/Diagnostic Test Results:   CXR   ( 2/10)  Possible trace left pleural effusion   No consolidation or edema       EKG   ( 2/10)      SR  HR  70     Complete LBBB         Results from last 7 days   Lab Units 02/11/22  0633 02/10/22  1817 02/10/22  1324   WBC Thousand/uL 7 79  --  11 06*   HEMOGLOBIN g/dL 7 6*  --  9 1*   HEMATOCRIT % 23 6*  --  28 1*   PLATELETS Thousands/uL 186 219 227   NEUTROS ABS Thousands/µL  --   --  8 31*                Results from last 7 days   Lab Units 02/11/22  0633 02/10/22  1324   SODIUM mmol/L 142 140   POTASSIUM mmol/L 3 8 4 5   CHLORIDE mmol/L 108 107   CO2 mmol/L 25 21   ANION GAP mmol/L 9 12   BUN mg/dL 80* 84*   CREATININE mg/dL 3 07* 3 13*   EGFR ml/min/1 73sq m 15 15   CALCIUM mg/dL 8 3 8 1*     Results from last 7 days   Lab Units 02/10/22  1324   AST U/L 17   ALT U/L 19   ALK PHOS U/L 109   TOTAL PROTEIN g/dL 6 6   ALBUMIN g/dL 3 1*   TOTAL BILIRUBIN mg/dL 0 27               Results from last 7 days   Lab Units 02/11/22  1108 02/11/22  0802 02/10/22  2101 02/10/22  1720 02/10/22  1625   POC GLUCOSE mg/dl 206* 103 128 86 53*            Results from last 7 days   Lab Units 02/11/22  0633 02/10/22  1324   GLUCOSE RANDOM mg/dL 104 145*                       Results from last 7 days   Lab Units 02/10/22  1817 02/10/22  1538 02/10/22  1324   HS TNI 0HR ng/L  --   --  186*   HS TNI 2HR ng/L  --  396*  --    HSTNI D2 ng/L  --  210*  --    HS TNI 4HR ng/L 418*  --   --    HSTNI D4 ng/L 232*  --   --                  Results from last 7 days   Lab Units 02/10/22  1324   NT-PRO BNP pg/mL 2,094*          ED Treatment:              Medication Administration from 02/10/2022 1246 to 02/10/2022 2010        Date/Time Order Dose Route Action Comments       02/10/2022 1444 furosemide (LASIX) injection 80 mg 80 mg Intravenous Given         02/10/2022 1628 allopurinol (ZYLOPRIM) tablet 100 mg 100 mg Oral Given         02/10/2022 1619 aspirin (ECOTRIN LOW STRENGTH) EC tablet 81 mg 81 mg Oral Given         02/10/2022 1754 atorvastatin (LIPITOR) tablet 40 mg 40 mg Oral Given         02/10/2022 1619 citalopram (CeleXA) tablet 40 mg 40 mg Oral Given         02/10/2022 1619 clopidogrel (PLAVIX) tablet 75 mg 75 mg Oral Given         02/10/2022 1754 gabapentin (NEURONTIN) capsule 300 mg 300 mg Oral Given         02/10/2022 1628 insulin lispro (HumaLOG) 100 units/mL subcutaneous injection 15 Units 0 Units Subcutaneous Hold         02/10/2022 1619 isosorbide mononitrate (IMDUR) 24 hr tablet 60 mg 60 mg Oral Given         02/10/2022 1618 pantoprazole (PROTONIX) EC tablet 40 mg 40 mg Oral Given         02/10/2022 1754 furosemide (LASIX) injection 40 mg 40 mg Intravenous Given         02/10/2022 1620 heparin (porcine) subcutaneous injection 5,000 Units 5,000 Units Subcutaneous Given         02/10/2022 1628 insulin lispro (HumaLOG) 100 units/mL subcutaneous injection 2-12 Units 0 Units Subcutaneous Hold            Present on Admission:   CAD (coronary artery disease)   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 heart failure (HCC)   Acute renal failure superimposed on stage 4 chronic kidney disease ([facility])        Admitting Diagnosis: CHF (congestive heart failure) ([facility]) [I50 9]  Chest pain [R07 9]  Volume overload [E87 70]  Acute kidney injury superimposed on chronic kidney disease ([facility] [address] ) [N17 9, N18 9]  Age/Sex: 63 y o  female  Admission Orders:  Scheduled Medications:  allopurinol, 100 mg, Oral, Daily  aspirin, 81 mg, Oral, Daily  atorvastatin, 40 mg, Oral, After Dinner  citalopram, 40 mg, Oral, Daily  clopidogrel, 75 mg, Oral, Daily  furosemide, 40 mg, Intravenous, BID  gabapentin, 300 mg, Oral, BID  heparin (porcine), 5,000 Units, Subcutaneous, Q8H LIAM  insulin glargine, 50 Units, Subcutaneous, HS  insulin lispro, 15 Units, Subcutaneous, TID With Meals  insulin lispro, 2-12 Units, Subcutaneous, TID AC  isosorbide mononitrate, 60 mg, Oral, Daily  levothyroxine, 50 mcg, Oral, Daily  OLANZapine, 5 mg, Oral, HS  pantoprazole, 40 mg, Oral, BID        Continuous IV Infusions:  PRN Meds:  acetaminophen, 650 mg, Oral, Q4H PRN  HYDROcodone-acetaminophen, 1 tablet, Oral, Q8H PRN  tiZANidine, 2 mg, Oral, Q8H PRN           IP CONSULT TO CARDIOLOGY     Network Utilization Review Department  ATTENTION: Please call with any questions or concerns to [phone] and carefully listen to the prompts so that you are directed to the right person  All voicemails are confidential   Amelie Amin all requests for admission clinical reviews, approved or denied determinations and any other requests to dedicated fax number below belonging to the campus where the patient is receiving treatment   List of dedicated fax numbers for the Facilities:  FACILITY NAME UR FAX NUMBER   ADMISSION DENIALS (Administrative/Medical Necessity) [phone]   PARENT CHILD HEALTH (Maternity/NICU/Pediatrics) [facility]  [phone]   [address] [place] [address] [place] [id number] [address] Godinez [address] [facility] [address] [facility] [phone]

## 2022-02-15 NOTE — PROGRESS NOTES
NEPHROLOGY PROGRESS NOTE   Umair Montaño 63 y o  female MRN: [id number]  Unit/Bed#: E5 -01 Encounter: [id number]  Reason for Consult: ALFRED      SUMMARY:    24-year-old female with past history of progressive Chronic Kidney Disease IV, uncontrolled diabetes, systolic/diastolic CHF, hypertension who presented with worsening shortness of breath, leg edema and weight gain   Nephrology consulted for acute kidney injury on top of Chronic Kidney Disease     ASSESSMENT and PLAN:     Acute kidney injury--stabilized and improved  --admission creatinine 3 1 mg/dL  --creatinine now plateaued in the high 2s to low threes  --will likely need to accept a higher baseline creatinine to keep out of volume overload and congestive heart failure  --she has underlying chronic kidney disease and have poor renal reserve  --nonoliguric, chronic suprapubic catheter  --currently on furosemide 60 mg IV b i d   --renal function improved creatinine down to 2 79 mg/dL with diuresis  --can transition IV diuretics to oral diuretics, Cardiology transition to oral torsemide which I agree with  --stable from renal standpoint for discharge     Chronic kidney disease stage IV  --outpatient nephrologist Dr Jocelin Vegas  --likely be at a new baseline creatinine, historically had been in the mid 2s  --suspect she will need to be in the high 2s to low threes to maintain close to euvolemic status     Acute on chronic combined congestive heart failure  --combined with cardiorenal  --echocardiogram shows ejection fraction 45% with grade 2 diastolic dysfunction  --volume status improving  --clinically feeling better  --transition to oral diuretic     Anemia  --chronic  --hemoglobin stable below goal     Hypertension  --volume mediated  --controlled    Hypokalemia  --transitioning IV diuretics to oral diuretic  --potassium replaced      SUBJECTIVE / INTERVAL HISTORY:    Swelling and shortness of breath improved    OBJECTIVE:  Current Weight: Weight - Scale: 125 kg (276 lb 3 8 oz)  Vitals:    02/14/22 2328 02/15/22 0600 02/15/22 0610 02/15/22 0728   BP: (!) 125/33  (!) 133/41 125/60   Pulse: 62  60 64   Resp:    18   Temp: 98 °F (36 7 °C)   (!) 97 1 °F (36 2 °C)   TempSrc:       SpO2: 93%  94% 95%   Weight:  125 kg (276 lb 3 8 oz)     Height:           Intake/Output Summary (Last 24 hours) at 2/15/2022 1202  Last data filed at 2/15/2022 0610  Gross per 24 hour   Intake 1100 ml   Output 4350 ml   Net -3250 ml       Review of Systems:    12 point ROS has been reviewed  Physical Exam  Vitals and nursing note reviewed  Constitutional:       General: She is not in acute distress  Appearance: She is well-developed  HENT:      Head: Normocephalic and atraumatic  Eyes:      General: No scleral icterus  Conjunctiva/sclera: Conjunctivae normal       Pupils: Pupils are equal, round, and reactive to light  Cardiovascular:      Rate and Rhythm: Normal rate and regular rhythm  Heart sounds: S1 normal and S2 normal  No murmur heard  No friction rub  No gallop  Pulmonary:      Effort: Pulmonary effort is normal  No respiratory distress  Breath sounds: Normal breath sounds  No wheezing or rales  Abdominal:      General: Bowel sounds are normal       Palpations: Abdomen is soft  Tenderness: There is no abdominal tenderness  There is no rebound  Musculoskeletal:         General: Normal range of motion  Cervical back: Normal range of motion and neck supple  Skin:     Findings: No rash  Neurological:      Mental Status: She is alert and oriented to person, place, and time     Psychiatric:         Behavior: Behavior normal          Medications:    Current Facility-Administered Medications:     acetaminophen (TYLENOL) tablet 650 mg, 650 mg, Oral, Q4H PRN, Don White PA-C    allopurinol (ZYLOPRIM) tablet 100 mg, 100 mg, Oral, Daily, Nataly Gillette MD, 100 mg at 02/15/22 0906    aspirin (ECOTRIN LOW STRENGTH) EC tablet 81 mg, 81 mg, Oral, Daily, Nataly Gillette MD, 81 mg at 02/15/22 3162    atorvastatin (LIPITOR) tablet 40 mg, 40 mg, Oral, After Jose Guadalupe Barraza MD, 40 mg at 02/14/22 1727    citalopram (CeleXA) tablet 40 mg, 40 mg, Oral, Daily, Nataly Gillette MD, 40 mg at 02/15/22 0906    clopidogrel (PLAVIX) tablet 75 mg, 75 mg, Oral, Daily, Nataly Gillette MD, 75 mg at 02/15/22 0787    ferrous sulfate tablet 325 mg, 325 mg, Oral, Daily With Breakfast, Kervin Setting, CRNP, 325 mg at 02/15/22 0744    gabapentin (NEURONTIN) capsule 300 mg, 300 mg, Oral, BID, Nataly Gillette MD, 300 mg at 02/15/22 9401    heparin (porcine) subcutaneous injection 5,000 Units, 5,000 Units, Subcutaneous, Q8H [facility], 5,000 Units at 02/15/22 0521 **AND** [COMPLETED] Platelet count, , , Once, Nataly Gillette MD    HYDROcodone-acetaminophen [facility]) 5-325 mg per tablet 1 tablet, 1 tablet, Oral, Q8H PRN, Don White PA-C, 1 tablet at 02/13/22 0916    insulin glargine (LANTUS) subcutaneous injection 35 Units 0 35 mL, 35 Units, Subcutaneous, HS, Alexa Graham MD, 35 Units at 02/14/22 2225    insulin lispro (HumaLOG) 100 units/mL subcutaneous injection 15 Units, 15 Units, Subcutaneous, TID With Meals, Nataly Gillette MD, 15 Units at 02/15/22 1137    insulin lispro (HumaLOG) 100 units/mL subcutaneous injection 2-12 Units, 2-12 Units, Subcutaneous, TID AC, 4 Units at 02/15/22 1137 **AND** Fingerstick Glucose (POCT), , , TID AC, Nataly Gillette MD    isosorbide mononitrate (IMDUR) 24 hr tablet 60 mg, 60 mg, Oral, Daily, Nataly Gillette MD, 60 mg at 02/15/22 3321    levothyroxine tablet 50 mcg, 50 mcg, Oral, Daily, Natlay Gillette MD, 50 mcg at 02/15/22 0521    OLANZapine (ZyPREXA) tablet 5 mg, 5 mg, Oral, HS, Nataly Gillette MD, 5 mg at 02/14/22 2225    pantoprazole (PROTONIX) EC tablet 40 mg, 40 mg, Oral, BID, Vincent Baig MD, 40 mg at 02/15/22 2408    potassium chloride (K-DUR,KLOR-CON) CR tablet 40 mEq, 40 mEq, Oral, BID, Isacc Belcher MD, 40 mEq at 02/15/22 1136    sodium chloride (OCEAN) 0 65 % nasal spray 1 spray, 1 spray, Each Nare, Q1H PRN, Fransisco Carranza MD, 1 spray at 02/13/22 0921    tiZANidine (ZANAFLEX) tablet 2 mg, 2 mg, Oral, Q8H PRN, Vincent Baig MD, 2 mg at 02/12/22 0520    [START ON 2/16/2022] torsemide [facility]) tablet 40 mg, 40 mg, Oral, Daily, Trace Dugan MD    Laboratory Results:  Results from last 7 days   Lab Units 02/15/22  0449 02/14/22  0523 02/13/22  0537 02/12/22  0625 02/11/22  0633 02/10/22  1817 02/10/22  1324   WBC Thousand/uL 7 34 5 72 7 04 7 11 7 79  --  11 06*   HEMOGLOBIN g/dL 8 8* 7 8* 8 2* 8 0* 7 6*  --  9 1*   HEMATOCRIT % 27 1* 25 1* 25 5* 24 6* 23 6*  --  28 1*   PLATELETS Thousands/uL 225 167 204 192 186 219 227   POTASSIUM mmol/L 3 3* 3 8 3 9 3 8 3 8  --  4 5   CHLORIDE mmol/L 103 103 106 108 108  --  107   CO2 mmol/L 28 26 26 24 25  --  21   BUN mg/dL 88* 95* 90* 82* 80*  --  84*   CREATININE mg/dL 2 79* 2 94* 2 93* 2 86* 3 07*  --  3 13*   CALCIUM mg/dL 8 8 8 8 8 5 8 7 8 3  --  8 1*   MAGNESIUM mg/dL  --  2 0  --  1 9  --   --   --

## 2022-02-15 NOTE — PLAN OF CARE
Problem: GENITOURINARY - ADULT  Goal: Maintains or returns to baseline urinary function  Description: INTERVENTIONS:  - Assess urinary function  - Encourage oral fluids to ensure adequate hydration if ordered  - Administer IV fluids as ordered to ensure adequate hydration  - Administer ordered medications as needed  - Offer frequent toileting  - Follow urinary retention protocol if ordered  Outcome: Progressing  Goal: Absence of urinary retention  Description: INTERVENTIONS:  - Assess patients ability to void and empty bladder  - Monitor I/O  - Bladder scan as needed  - Discuss with physician/AP medications to alleviate retention as needed  - Discuss catheterization for long term situations as appropriate  Outcome: Progressing  Goal: Urinary catheter remains patent  Description: INTERVENTIONS:  - Assess patency of urinary catheter  - If patient has a chronic morales, consider changing catheter if non-functioning  - Follow guidelines for intermittent irrigation of non-functioning urinary catheter  Outcome: Progressing     Problem: METABOLIC, FLUID AND ELECTROLYTES - ADULT  Goal: Electrolytes maintained within normal limits  Description: INTERVENTIONS:  - Monitor labs and assess patient for signs and symptoms of electrolyte imbalances  - Administer electrolyte replacement as ordered  - Monitor response to electrolyte replacements, including repeat lab results as appropriate  - Instruct patient on fluid and nutrition as appropriate  Outcome: Progressing  Goal: Fluid balance maintained  Description: INTERVENTIONS:  - Monitor labs   - Monitor I/O and WT  - Instruct patient on fluid and nutrition as appropriate  - Assess for signs & symptoms of volume excess or deficit  Outcome: Progressing  Goal: Glucose maintained within target range  Description: INTERVENTIONS:  - Monitor Blood Glucose as ordered  - Assess for signs and symptoms of hyperglycemia and hypoglycemia  - Administer ordered medications to maintain glucose within target range  - Assess nutritional intake and initiate nutrition service referral as needed  Outcome: Progressing     Problem: MUSCULOSKELETAL - ADULT  Goal: Maintain or return mobility to safest level of function  Description: INTERVENTIONS:  - Assess patient's ability to carry out ADLs; assess patient's baseline for ADL function and identify physical deficits which impact ability to perform ADLs (bathing, care of mouth/teeth, toileting, grooming, dressing, etc )  - Assess/evaluate cause of self-care deficits   - Assess range of motion  - Assess patient's mobility  - Assess patient's need for assistive devices and provide as appropriate  - Encourage maximum independence but intervene and supervise when necessary  - Involve family in performance of ADLs  - Assess for home care needs following discharge   - Consider OT consult to assist with ADL evaluation and planning for discharge  - Provide patient education as appropriate  Outcome: Progressing  Goal: Maintain proper alignment of affected body part  Description: INTERVENTIONS:  - Support, maintain and protect limb and body alignment  - Provide patient/ family with appropriate education  Outcome: Progressing     Problem: Potential for Falls  Goal: Patient will remain free of falls  Description: INTERVENTIONS:  - Educate patient/family on patient safety including physical limitations  - Instruct patient to call for assistance with activity   - Consult OT/PT to assist with strengthening/mobility   - Keep Call bell within reach  - Keep bed low and locked with side rails adjusted as appropriate  - Keep care items and personal belongings within reach  - Initiate and maintain comfort rounds  - Make Fall Risk Sign visible to staff  - Offer Toileting every 2 Hours, in advance of need  - Initiate/Maintain BED alarm  - Obtain necessary fall risk management equipment: ALARM  - Apply yellow socks and bracelet for high fall risk patients  - Consider moving patient to room near nurses station  Outcome: Progressing     Problem: MOBILITY - ADULT  Goal: Maintain or return to baseline ADL function  Description: INTERVENTIONS:  -  Assess patient's ability to carry out ADLs; assess patient's baseline for ADL function and identify physical deficits which impact ability to perform ADLs (bathing, care of mouth/teeth, toileting, grooming, dressing, etc )  - Assess/evaluate cause of self-care deficits   - Assess range of motion  - Assess patient's mobility; develop plan if impaired  - Assess patient's need for assistive devices and provide as appropriate  - Encourage maximum independence but intervene and supervise when necessary  - Involve family in performance of ADLs  - Assess for home care needs following discharge   - Consider OT consult to assist with ADL evaluation and planning for discharge  - Provide patient education as appropriate  Outcome: Progressing  Goal: Maintains/Returns to pre admission functional level  Description: INTERVENTIONS:  - Perform BMAT or MOVE assessment daily    - Set and communicate daily mobility goal to care team and patient/family/caregiver  - Collaborate with rehabilitation services on mobility goals if consulted  - Perform Range of Motion 3 times a day  - Reposition patient every 2 hours    - Dangle patient 3 times a day  - Stand patient 3 times a day  - Ambulate patient 3 times a day  - Out of bed to chair 3 times a day   - Out of bed for meals 3 times a day  - Out of bed for toileting  - Record patient progress and toleration of activity level   Outcome: Progressing     Problem: CARDIOVASCULAR - ADULT  Goal: Maintains optimal cardiac output and hemodynamic stability  Description: INTERVENTIONS:  - Monitor I/O, vital signs and rhythm  - Monitor for S/S and trends of decreased cardiac output  - Administer and titrate ordered vasoactive medications to optimize hemodynamic stability  - Assess quality of pulses, skin color and temperature  - Assess for signs of decreased coronary artery perfusion  - Instruct patient to report change in severity of symptoms  Outcome: Progressing  Goal: Absence of cardiac dysrhythmias or at baseline rhythm  Description: INTERVENTIONS:  - Continuous cardiac monitoring, vital signs, obtain 12 lead EKG if ordered  - Administer antiarrhythmic and heart rate control medications as ordered  - Monitor electrolytes and administer replacement therapy as ordered  Outcome: Progressing

## 2022-02-15 NOTE — ASSESSMENT & PLAN NOTE
Lab Results   Component Value Date    HGBA1C 8 5 (A) 12/16/2021     · Adequately controlled today  · Continue current regimen

## 2022-02-15 NOTE — ASSESSMENT & PLAN NOTE
Wt Readings from Last 3 Encounters:   02/15/22 125 kg (276 lb 3 8 oz)   02/10/22 (!) 137 kg (302 lb)   12/16/21 125 kg (276 lb 4 8 oz)     This is a 72-year-old female with history of CKD stage 4, depression, diabetes mellitus, hypertension, hyperlipidemia, CHF presented with progressive worsening dyspnea and lower extremity edema for the past 2 weeks      · Continue IV diuresis per nephrology recommendations  · Monitor lytes, weight, I/O  · Last ECHO 8/2021 with reduced EF and grade 2 diastolic dysfunction

## 2022-02-15 NOTE — PROGRESS NOTES
Progress Note - Cardiology   Lyndon Sorto 63 y o  female MRN: [id number]  Unit/Bed#: E5 -01 Encounter: [id number]    Assessment:    · Acute on chronic systolic and diastolic congestive Heart failure, appears compensated and euvolemic  · Hypokalemia  · Chronic kidney disease stage 4  · Non myocardial infarction troponin elevation  · Coronary artery disease, PCI x5 to the LAD and circumflex,  of the RCA noted on prior catheterization  · Hypertension  · Hyperlipidemia  · Diabetes mellitus type 2      Plan:     Discontinue IV furosemide 60 mg b i d    Patient on potassium replacement 40 mEq b i d , will need to carefully watch potassium replacement due to chronic kidney disease   Begin torsemide 40 mg daily tomorrow although long-term patient may require 60 mg daily (due to patient's chronic kidney disease, will begin with 40 mg daily and have dose re adjusted as an outpatient)      Interval history:  Patient feels good and denies shortness of breath  She is anxious to go home  Her potassium is 3 3  Will also discontinue IV Lasix and begin torsemide tomorrow morning  Possible discharge tomorrow      PROBLEM LIST:    Patient Active Problem List    Diagnosis Date Noted    Other artificial openings of urinary tract status ([facility] ) 02/10/2022    Continuous opioid dependence ([facility] ) 02/10/2022    Adrenal nodule ([facility] ) 02/10/2022    Stable angina ([facility] ) 02/10/2022    Volume overload 02/10/2022    Acquired hypothyroidism 10/14/2021    Mixed hyperlipidemia 10/14/2021    Gastroesophageal reflux disease without esophagitis 10/13/2021    Other proteinuria 09/14/2021    CKD (chronic kidney disease) 09/14/2021    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 heart failure ([facility] ) 08/24/2021    Prolonged QT interval 08/24/2021    Urinary tract infection associated with cystostomy catheter ([facility] ) 08/23/2021    Neurogenic bladder 08/23/2021    Morbid obesity with BMI of 45 0-49 9, adult ([facility] ) 06/15/2021    History of heart artery stent 06/15/2021    Stage 4 chronic kidney disease ([place] [address] ) 06/04/2021    Memory impairment 05/26/2021    Chronic bronchitis ([place] [address] ) 05/25/2021    Severe episode of recurrent major depressive disorder, without psychotic features ([place] [address] ) 05/25/2021    Skin ulcer of hand, limited to breakdown of skin ([place] [address] ) 02/25/2021    HTN (hypertension) 12/21/2020    Diabetic ulcer of toe of right foot associated with type 2 diabetes mellitus, with muscle involvement without evidence of necrosis ([place] [address] ) 11/25/2020    Head lump 11/11/2020    Need for follow-up by  11/11/2020    Anemia 09/09/2020    Abnormal LFTs 09/09/2020    S/P cervical spinal fusion 09/09/2020    Major depressive disorder 09/02/2020    Type 2 diabetes mellitus with stage 4 chronic kidney disease, with long-term current use of insulin ([place] [address] ) 09/02/2020    Anxiety 09/02/2020    CAD (coronary artery disease) 09/02/2020    Osteoarthritis of left knee 09/02/2020    Cellulitis of finger of right hand 08/26/2020       Vitals: /60   Pulse 64   Temp (!) 97 1 °F (36 2 °C)   Resp 18   Ht 5' 8" (1 727 m)   Wt 125 kg (276 lb 3 8 oz)   SpO2 95%   BMI 42 00 kg/m²   PREVIOUS WEIGHTS:   Weight (last 2 days)     Date/Time Weight    02/15/22 0600 125 (276 24)    02/14/22 0600 135 (297 62)    02/13/22 0537 135 (297 62)          Wt Readings from Last 2 Encounters:   02/15/22 125 kg (276 lb 3 8 oz)   02/10/22 (!) 137 kg (302 lb)       Labs/Data:        Results from last 7 days   Lab Units 02/15/22  0449 02/14/22  0523 02/13/22  0537   WBC Thousand/uL 7 34 5 72 7 04   HEMOGLOBIN g/dL 8 8* 7 8* 8 2*   HEMATOCRIT % 27 1* 25 1* 25 5*   MCV fL 97 101* 98   MCH pg 31 4 31 5 31 5   MCHC g/dL 32 5 31 1* 32 2   PLATELETS Thousands/uL 225 167 204     Results from last 7 days   Lab Units 02/15/22  0449 02/14/22  0523 02/13/22  0537   EGFR ml/min/1 73sq m 17 16 16   SODIUM mmol/L 140 140 142   POTASSIUM mmol/L 3 3* 3 8 3 9 CHLORIDE mmol/L 103 103 106   CO2 mmol/L 28 26 26   BUN mg/dL 88* 95* 90*   CREATININE mg/dL 2 79* 2 94* 2 93*     Results from last 7 days   Lab Units 02/15/22  0449 02/14/22  0523 02/13/22  0537 02/12/22  0625 02/12/22  0625 02/11/22  0633 02/10/22  1324   CALCIUM mg/dL 8 8 8 8 8 5   < > 8 7   < > 8 1*   AST U/L  --   --   --   --   --   --  17   ALT U/L  --   --   --   --   --   --  19   ALK PHOS U/L  --   --   --   --   --   --  109   MAGNESIUM mg/dL  --  2 0  --   --  1 9  --   --     < > = values in this interval not displayed           Lab Results   Component Value Date    NTBNP 2,094 (H) 02/10/2022    NTBNP 985 (H) 08/23/2021     Lab Results   Component Value Date    CHOLESTEROL 152 08/03/2021    TRIG 180 (H) 08/03/2021    HDL 30 (L) 08/03/2021    LDLCALC 86 08/03/2021    HGBA1C 8 5 (A) 12/16/2021         Current Facility-Administered Medications:     acetaminophen (TYLENOL) tablet 650 mg, 650 mg, Oral, Q4H PRN, Ifeoma Ruelas PA-C    allopurinol (ZYLOPRIM) tablet 100 mg, 100 mg, Oral, Daily, Della Valverde MD, 100 mg at 02/15/22 8238    aspirin (ECOTRIN LOW STRENGTH) EC tablet 81 mg, 81 mg, Oral, Daily, Della Valverde MD, 81 mg at 02/15/22 7398    atorvastatin (LIPITOR) tablet 40 mg, 40 mg, Oral, After Amalia Etienne MD, 40 mg at 02/14/22 1727    citalopram (CeleXA) tablet 40 mg, 40 mg, Oral, Daily, Della Valverde MD, 40 mg at 02/15/22 0906    clopidogrel (PLAVIX) tablet 75 mg, 75 mg, Oral, Daily, Della Valverde MD, 75 mg at 02/15/22 3332    ferrous sulfate tablet 325 mg, 325 mg, Oral, Daily With Breakfast, CHERELLE Manzo, 325 mg at 02/15/22 0744    furosemide (LASIX) injection 60 mg, 60 mg, Intravenous, BID (diuretic), Fadi Henson DO, 60 mg at 02/15/22 0744    gabapentin (NEURONTIN) capsule 300 mg, 300 mg, Oral, BID, Della Valverde MD, 300 mg at 02/15/22 0906    heparin (porcine) subcutaneous injection 5,000 Units, 5,000 Units, Subcutaneous, Q8H [address], 5,000 Units at 02/15/22 0521 **AND** [COMPLETED] Platelet count, , , Once, Evan Zelaya MD    HYDROcodone-acetaminophen [facility]) 5-325 mg per tablet 1 tablet, 1 tablet, Oral, Q8H PRN, Mat Collins PA-C, 1 tablet at 02/13/22 0916    insulin glargine (LANTUS) subcutaneous injection 35 Units 0 35 mL, 35 Units, Subcutaneous, HS, Nina Mcginnis MD, 35 Units at 02/14/22 2225    insulin lispro (HumaLOG) 100 units/mL subcutaneous injection 15 Units, 15 Units, Subcutaneous, TID With Meals, Evan Zelaya MD, 15 Units at 02/15/22 1137    insulin lispro (HumaLOG) 100 units/mL subcutaneous injection 2-12 Units, 2-12 Units, Subcutaneous, TID AC, 4 Units at 02/15/22 1137 **AND** Fingerstick Glucose (POCT), , , TID AC, Evan Zelaya MD    isosorbide mononitrate (IMDUR) 24 hr tablet 60 mg, 60 mg, Oral, Daily, Evan Zelaya MD, 60 mg at 02/15/22 0941    levothyroxine tablet 50 mcg, 50 mcg, Oral, Daily, Evan Zelaya MD, 50 mcg at 02/15/22 0521    OLANZapine (ZyPREXA) tablet 5 mg, 5 mg, Oral, HS, Evan Zelaya MD, 5 mg at 02/14/22 2225    pantoprazole (PROTONIX) EC tablet 40 mg, 40 mg, Oral, BID, Evan Zelaya MD, 40 mg at 02/15/22 1268    potassium chloride (K-DUR,KLOR-CON) CR tablet 40 mEq, 40 mEq, Oral, BID, Dragan Mccullough MD, 40 mEq at 02/15/22 1136    sodium chloride (OCEAN) 0 65 % nasal spray 1 spray, 1 spray, Each Nare, Q1H PRN, Nina Mcginnis MD, 1 spray at 02/13/22 0921    tiZANidine (ZANAFLEX) tablet 2 mg, 2 mg, Oral, Q8H PRN, Evan Zelaya MD, 2 mg at 02/12/22 8007  Allergies   Allergen Reactions    Codeine      Other reaction(s): Nausea and Vomiting    Latex Itching         Intake/Output Summary (Last 24 hours) at 2/15/2022 1151  Last data filed at 2/15/2022 0610  Gross per 24 hour   Intake 1100 ml   Output 4350 ml   Net -3250 ml Invasive Devices  Report    Peripheral Intravenous Line            Peripheral IV 02/14/22 Right Forearm 1 day          Drain            Suprapubic Catheter Non-latex 16 Fr  131 days                Review of Systems   Respiratory: Negative for cough, choking, chest tightness, shortness of breath and wheezing  Cardiovascular: Negative for chest pain, palpitations and leg swelling  Musculoskeletal: Negative for gait problem  Skin: Negative for rash  Neurological: Negative for dizziness, tremors, syncope, weakness, light-headedness, numbness and headaches  Psychiatric/Behavioral: Negative for agitation and behavioral problems  The patient is not hyperactive  Physical Exam  Constitutional:       General: She is not in acute distress  Appearance: She is well-developed  HENT:      Head: Normocephalic and atraumatic  Neck:      Thyroid: No thyromegaly  Vascular: No carotid bruit or JVD  Trachea: No tracheal deviation  Cardiovascular:      Rate and Rhythm: Normal rate and regular rhythm  Pulses: Normal pulses  Heart sounds: Normal heart sounds  No murmur heard  No friction rub  No gallop  Pulmonary:      Effort: Pulmonary effort is normal  No respiratory distress  Breath sounds: Normal breath sounds  No wheezing, rhonchi or rales  Chest:      Chest wall: No tenderness  Musculoskeletal:         General: Normal range of motion  Cervical back: Normal range of motion and neck supple  Comments: Trace at most bilateral pretibial edema   Skin:     General: Skin is warm and dry  Neurological:      General: No focal deficit present  Mental Status: She is alert and oriented to person, place, and time  Psychiatric:         Mood and Affect: Mood normal          Behavior: Behavior normal          Thought Content: Thought content normal          Judgment: Judgment normal          ======================================================     Imaging:    I have personally reviewed pertinent reports  Portions of the record may have been created with voice recognition software  Occasional wrong word or "sound a like" substitutions may have occurred due to the inherent limitations of voice recognition software  Read the chart carefully and recognize, using context, where substitutions have occurred

## 2022-02-15 NOTE — ASSESSMENT & PLAN NOTE
· Acute renal failure on CKD stage 4 now with progression of CKD   · Currently at new baseline  · Progression of CKD likely cardiorenal   · Nephrology and cardiology following

## 2022-02-15 NOTE — PROGRESS NOTES
[facility]  Progress Note - Jose Lancaster 1960, 63 y o  female MRN: [id number]  Unit/Bed#: E5 -01 Encounter: [id number]  Primary Care Provider: CHERELLE Jamison   Date and time admitted to hospital: 2/10/2022 12:46 PM    * 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 heart failure ([facility] )  Assessment & Plan  Wt Readings from Last 3 Encounters:   02/15/22 125 kg (276 lb 3 8 oz)   02/10/22 (!) 137 kg (302 lb)   12/16/21 125 kg (276 lb 4 8 oz)     This is a 72-year-old female with history of CKD stage 4, depression, diabetes mellitus, hypertension, hyperlipidemia, CHF presented with progressive worsening dyspnea and lower extremity edema for the past 2 weeks  · Continue IV diuresis per nephrology recommendations  · Monitor lytes, weight, I/O  · Last ECHO 8/2021 with reduced EF and grade 2 diastolic dysfunction    Stage 4 chronic kidney disease ([facility] )  Assessment & Plan  · Acute renal failure on CKD stage 4 now with progression of CKD   · Currently at new baseline  · Progression of CKD likely cardiorenal   · Nephrology and cardiology following    Anemia  Assessment & Plan  · Currently at baseline  Hemoglobin  · Likely anemia of chronic disease and CKD  · Monitor daily     CAD (coronary artery disease)  Assessment & Plan  · History of multiple stents to the LAD and recent stenting to the RCA and August 2021  · Continue dual anti-platelet with aspirin and Plavix, statin, Imdur or  · Hold Coreg due to bradycardia and compensated CHF        Type 2 diabetes mellitus with stage 4 chronic kidney disease, with long-term current use of insulin ([facility] )  Assessment & Plan  Lab Results   Component Value Date    HGBA1C 8 5 (A) 12/16/2021     · Adequately controlled today  · Continue current regimen    Elevated troponin-resolved as of 2/15/2022  Assessment & Plan  · Non chest pain associated elevation in troponin secondary to chf exacerbation and joao  · No further intervention currently           VTE Pharmacologic Prophylaxis: VTE Score: 4 Moderate Risk (Score 3-4) - Pharmacological DVT Prophylaxis Ordered: heparin  Patient Centered Rounds: I performed bedside rounds with nursing staff today  Discussions with Specialists or Other Care Team Provider: cardiology, nephrology     Education and Discussions with Family / Patient: Patient declined call to   Time Spent for Care: 30 minutes  More than 50% of total time spent on counseling and coordination of care as described above  Current Length of Stay: 5 day(s)  Current Patient Status: Inpatient   Certification Statement: The patient will continue to require additional inpatient hospital stay due to IV diuresis  Discharge Plan: Anticipate discharge in 24-48 hrs to rehab facility  Code Status: Level 1 - Full Code    Subjective:   No chest pain or chest palpitations  No shortness of breath  Appetite is good  Objective:     Vitals:   Temp (24hrs), Av 8 °F (36 6 °C), Min:97 1 °F (36 2 °C), Max:98 °F (36 7 °C)    Temp:  [97 1 °F (36 2 °C)-98 °F (36 7 °C)] 97 1 °F (36 2 °C)  HR:  [60-68] 64  Resp:  [18] 18  BP: (125-156)/(33-65) 125/60  SpO2:  [92 %-95 %] 95 %  Body mass index is 42 kg/m²  Input and Output Summary (last 24 hours): Intake/Output Summary (Last 24 hours) at 2/15/2022 1002  Last data filed at 2/15/2022 0610  Gross per 24 hour   Intake 1100 ml   Output 4350 ml   Net -3250 ml       Physical Exam:   Physical Exam  Vitals and nursing note reviewed  Constitutional:       Appearance: Normal appearance  HENT:      Head: Normocephalic and atraumatic  Right Ear: External ear normal       Left Ear: External ear normal       Nose: No congestion or rhinorrhea  Mouth/Throat:      Mouth: Mucous membranes are dry  Pharynx: Oropharynx is clear  Eyes:      General:         Right eye: No discharge  Left eye: No discharge     Cardiovascular:      Rate and Rhythm: Normal rate and regular rhythm  Pulmonary:      Effort: Pulmonary effort is normal  No respiratory distress  Abdominal:      General: Abdomen is flat  Palpations: Abdomen is soft  Musculoskeletal:      Cervical back: Normal range of motion and neck supple  Right lower leg: No edema  Left lower leg: No edema  Skin:     General: Skin is warm and dry  Neurological:      General: No focal deficit present  Mental Status: She is alert  Mental status is at baseline  Psychiatric:         Mood and Affect: Mood normal          Behavior: Behavior normal           Additional Data:     Labs:  Results from last 7 days   Lab Units 02/15/22  0449   WBC Thousand/uL 7 34   HEMOGLOBIN g/dL 8 8*   HEMATOCRIT % 27 1*   PLATELETS Thousands/uL 225   NEUTROS PCT % 61   LYMPHS PCT % 26   MONOS PCT % 9   EOS PCT % 4     Results from last 7 days   Lab Units 02/15/22  0449 02/11/22  0633 02/10/22  1324   SODIUM mmol/L 140   < > 140   POTASSIUM mmol/L 3 3*   < > 4 5   CHLORIDE mmol/L 103   < > 107   CO2 mmol/L 28   < > 21   BUN mg/dL 88*   < > 84*   CREATININE mg/dL 2 79*   < > 3 13*   ANION GAP mmol/L 9   < > 12   CALCIUM mg/dL 8 8   < > 8 1*   ALBUMIN g/dL  --   --  3 1*   TOTAL BILIRUBIN mg/dL  --   --  0 27   ALK PHOS U/L  --   --  109   ALT U/L  --   --  19   AST U/L  --   --  17   GLUCOSE RANDOM mg/dL 177*   < > 145*    < > = values in this interval not displayed           Results from last 7 days   Lab Units 02/15/22  0730 02/14/22  2100 02/14/22  1559 02/14/22  1111 02/14/22  0718 02/13/22  2104 02/13/22  1552 02/13/22  1047 02/13/22  0711 02/12/22  2136 02/12/22  1542 02/12/22  1028   POC GLUCOSE mg/dl 179* 258* 312* 266* 235* 265* 206* 214* 119 214* 173* 190*               Lines/Drains:  Invasive Devices  Report    Peripheral Intravenous Line            Peripheral IV 02/14/22 Right Forearm 1 day          Drain            Suprapubic Catheter Non-latex 16 Fr  131 days                      Imaging: No pertinent imaging reviewed  Recent Cultures (last 7 days):         Last 24 Hours Medication List:   Current Facility-Administered Medications   Medication Dose Route Frequency Provider Last Rate    acetaminophen  650 mg Oral Q4H PRN Idris Gill PA-C      allopurinol  100 mg Oral Daily Ilda Camarena MD      aspirin  81 mg Oral Daily Ilda Camarena MD      atorvastatin  40 mg Oral After Marielle Avila MD      citalopram  40 mg Oral Daily Ilda Camarena MD      clopidogrel  75 mg Oral Daily Ilda Camarena MD      ferrous sulfate  325 mg Oral Daily With Breakfast CHERELLE Mayen      furosemide  60 mg Intravenous BID (diuretic) Oscar Novel, DO      gabapentin  300 mg Oral BID Ilda Camarena MD      heparin (porcine)  5,000 Units Subcutaneous [facility] Ilda Camarena MD      HYDROcodone-acetaminophen  1 tablet Oral Q8H PRN Idris Gill PA-C      insulin glargine  35 Units Subcutaneous HS Avila Villa MD      insulin lispro  15 Units Subcutaneous TID With Meals Ilda Camarena MD      insulin lispro  2-12 Units Subcutaneous TID [facility] Ilda Camarena MD      isosorbide mononitrate  60 mg Oral Daily Ilda Camarena MD      levothyroxine  50 mcg Oral Daily Ilda Camarena MD      OLANZapine  5 mg Oral HS Ilda Camarena MD      pantoprazole  40 mg Oral BID Ilda Camarena MD      potassium chloride  40 mEq Oral BID Bridget Melgar MD      sodium chloride  1 spray Each Nare Q1H PRN Avila Villa MD      tiZANidine  2 mg Oral Q8H PRN Ilda Camarena MD          Today, Patient Was Seen By: Candace Ferraro MD    **Please Note: This note may have been constructed using a voice recognition system  **

## 2022-02-16 VITALS
DIASTOLIC BLOOD PRESSURE: 63 MMHG | SYSTOLIC BLOOD PRESSURE: 135 MMHG | TEMPERATURE: 97.6 F | BODY MASS INDEX: 41.9 KG/M2 | OXYGEN SATURATION: 97 % | HEART RATE: 65 BPM | RESPIRATION RATE: 18 BRPM | HEIGHT: 68 IN | WEIGHT: 276.46 LBS

## 2022-02-16 LAB
ANION GAP SERPL CALCULATED.3IONS-SCNC: 8 MMOL/L (ref 4–13)
BUN SERPL-MCNC: 86 MG/DL (ref 5–25)
CALCIUM SERPL-MCNC: 8.7 MG/DL (ref 8.3–10.1)
CHLORIDE SERPL-SCNC: 103 MMOL/L (ref 100–108)
CO2 SERPL-SCNC: 28 MMOL/L (ref 21–32)
CREAT SERPL-MCNC: 2.73 MG/DL (ref 0.6–1.3)
GFR SERPL CREATININE-BSD FRML MDRD: 18 ML/MIN/1.73SQ M
GLUCOSE SERPL-MCNC: 217 MG/DL (ref 65–140)
GLUCOSE SERPL-MCNC: 217 MG/DL (ref 65–140)
GLUCOSE SERPL-MCNC: 222 MG/DL (ref 65–140)
MAGNESIUM SERPL-MCNC: 1.8 MG/DL (ref 1.6–2.6)
POTASSIUM SERPL-SCNC: 3.8 MMOL/L (ref 3.5–5.3)
SODIUM SERPL-SCNC: 139 MMOL/L (ref 136–145)

## 2022-02-16 PROCEDURE — 80048 BASIC METABOLIC PNL TOTAL CA: CPT | Performed by: STUDENT IN AN ORGANIZED HEALTH CARE EDUCATION/TRAINING PROGRAM

## 2022-02-16 PROCEDURE — 82948 REAGENT STRIP/BLOOD GLUCOSE: CPT

## 2022-02-16 PROCEDURE — 83735 ASSAY OF MAGNESIUM: CPT | Performed by: STUDENT IN AN ORGANIZED HEALTH CARE EDUCATION/TRAINING PROGRAM

## 2022-02-16 PROCEDURE — 99239 HOSP IP/OBS DSCHRG MGMT >30: CPT | Performed by: STUDENT IN AN ORGANIZED HEALTH CARE EDUCATION/TRAINING PROGRAM

## 2022-02-16 PROCEDURE — 99232 SBSQ HOSP IP/OBS MODERATE 35: CPT | Performed by: INTERNAL MEDICINE

## 2022-02-16 RX ORDER — TORSEMIDE 20 MG/1
40 TABLET ORAL DAILY
Qty: 60 TABLET | Refills: 0 | Status: SHIPPED | OUTPATIENT
Start: 2022-02-17 | End: 2022-03-16

## 2022-02-16 RX ORDER — POTASSIUM CHLORIDE 20 MEQ/1
20 TABLET, EXTENDED RELEASE ORAL 2 TIMES DAILY
Qty: 14 TABLET | Refills: 0 | Status: SHIPPED | OUTPATIENT
Start: 2022-02-16 | End: 2022-03-04 | Stop reason: ALTCHOICE

## 2022-02-16 RX ADMIN — INSULIN LISPRO 4 UNITS: 100 INJECTION, SOLUTION INTRAVENOUS; SUBCUTANEOUS at 07:36

## 2022-02-16 RX ADMIN — FERROUS SULFATE TAB 325 MG (65 MG ELEMENTAL FE) 325 MG: 325 (65 FE) TAB at 07:36

## 2022-02-16 RX ADMIN — ALLOPURINOL 100 MG: 100 TABLET ORAL at 08:50

## 2022-02-16 RX ADMIN — ACETAMINOPHEN 650 MG: 325 TABLET, FILM COATED ORAL at 11:17

## 2022-02-16 RX ADMIN — ISOSORBIDE MONONITRATE 60 MG: 60 TABLET, EXTENDED RELEASE ORAL at 06:28

## 2022-02-16 RX ADMIN — ASPIRIN 81 MG: 81 TABLET, COATED ORAL at 08:49

## 2022-02-16 RX ADMIN — INSULIN LISPRO 4 UNITS: 100 INJECTION, SOLUTION INTRAVENOUS; SUBCUTANEOUS at 11:18

## 2022-02-16 RX ADMIN — INSULIN LISPRO 15 UNITS: 100 INJECTION, SOLUTION INTRAVENOUS; SUBCUTANEOUS at 11:18

## 2022-02-16 RX ADMIN — PANTOPRAZOLE SODIUM 40 MG: 40 TABLET, DELAYED RELEASE ORAL at 06:27

## 2022-02-16 RX ADMIN — POTASSIUM CHLORIDE 40 MEQ: 1500 TABLET, EXTENDED RELEASE ORAL at 08:49

## 2022-02-16 RX ADMIN — INSULIN LISPRO 15 UNITS: 100 INJECTION, SOLUTION INTRAVENOUS; SUBCUTANEOUS at 07:36

## 2022-02-16 RX ADMIN — CLOPIDOGREL BISULFATE 75 MG: 75 TABLET ORAL at 08:50

## 2022-02-16 RX ADMIN — HEPARIN SODIUM 5000 UNITS: 5000 INJECTION INTRAVENOUS; SUBCUTANEOUS at 05:26

## 2022-02-16 RX ADMIN — GABAPENTIN 300 MG: 300 CAPSULE ORAL at 08:49

## 2022-02-16 RX ADMIN — LEVOTHYROXINE SODIUM 50 MCG: 50 TABLET ORAL at 05:26

## 2022-02-16 RX ADMIN — TORSEMIDE 40 MG: 20 TABLET ORAL at 08:52

## 2022-02-16 RX ADMIN — CITALOPRAM HYDROBROMIDE 40 MG: 20 TABLET ORAL at 08:50

## 2022-02-16 NOTE — ASSESSMENT & PLAN NOTE
Wt Readings from Last 3 Encounters:   02/16/22 125 kg (276 lb 7 3 oz)   02/10/22 (!) 137 kg (302 lb)   12/16/21 125 kg (276 lb 4 8 oz)     This is a 51-year-old female with history of CKD stage 4, depression, diabetes mellitus, hypertension, hyperlipidemia, CHF presented with progressive worsening dyspnea and lower extremity edema for the past 2 weeks      · Last ECHO 8/2021 with reduced EF and grade 2 diastolic dysfunction  · Status post IV diuresis, net output 8 67 liters  · Transitioned to PO torsemide, stable for discharge  · Follow up with nephrology and cardiology as an outpatient

## 2022-02-16 NOTE — PLAN OF CARE
Problem: GENITOURINARY - ADULT  Goal: Maintains or returns to baseline urinary function  Description: INTERVENTIONS:  - Assess urinary function  - Encourage oral fluids to ensure adequate hydration if ordered  - Administer IV fluids as ordered to ensure adequate hydration  - Administer ordered medications as needed  - Offer frequent toileting  - Follow urinary retention protocol if ordered  Outcome: Progressing  Goal: Absence of urinary retention  Description: INTERVENTIONS:  - Assess patients ability to void and empty bladder  - Monitor I/O  - Bladder scan as needed  - Discuss with physician/AP medications to alleviate retention as needed  - Discuss catheterization for long term situations as appropriate  Outcome: Progressing  Goal: Urinary catheter remains patent  Description: INTERVENTIONS:  - Assess patency of urinary catheter  - If patient has a chronic morales, consider changing catheter if non-functioning  - Follow guidelines for intermittent irrigation of non-functioning urinary catheter  Outcome: Progressing     Problem: METABOLIC, FLUID AND ELECTROLYTES - ADULT  Goal: Electrolytes maintained within normal limits  Description: INTERVENTIONS:  - Monitor labs and assess patient for signs and symptoms of electrolyte imbalances  - Administer electrolyte replacement as ordered  - Monitor response to electrolyte replacements, including repeat lab results as appropriate  - Instruct patient on fluid and nutrition as appropriate  Outcome: Progressing  Goal: Fluid balance maintained  Description: INTERVENTIONS:  - Monitor labs   - Monitor I/O and WT  - Instruct patient on fluid and nutrition as appropriate  - Assess for signs & symptoms of volume excess or deficit  Outcome: Progressing  Goal: Glucose maintained within target range  Description: INTERVENTIONS:  - Monitor Blood Glucose as ordered  - Assess for signs and symptoms of hyperglycemia and hypoglycemia  - Administer ordered medications to maintain glucose within target range  - Assess nutritional intake and initiate nutrition service referral as needed  Outcome: Progressing     Problem: MUSCULOSKELETAL - ADULT  Goal: Maintain or return mobility to safest level of function  Description: INTERVENTIONS:  - Assess patient's ability to carry out ADLs; assess patient's baseline for ADL function and identify physical deficits which impact ability to perform ADLs (bathing, care of mouth/teeth, toileting, grooming, dressing, etc )  - Assess/evaluate cause of self-care deficits   - Assess range of motion  - Assess patient's mobility  - Assess patient's need for assistive devices and provide as appropriate  - Encourage maximum independence but intervene and supervise when necessary  - Involve family in performance of ADLs  - Assess for home care needs following discharge   - Consider OT consult to assist with ADL evaluation and planning for discharge  - Provide patient education as appropriate  Outcome: Progressing  Goal: Maintain proper alignment of affected body part  Description: INTERVENTIONS:  - Support, maintain and protect limb and body alignment  - Provide patient/ family with appropriate education  Outcome: Progressing     Problem: Potential for Falls  Goal: Patient will remain free of falls  Description: INTERVENTIONS:  - Educate patient/family on patient safety including physical limitations  - Instruct patient to call for assistance with activity   - Consult OT/PT to assist with strengthening/mobility   - Keep Call bell within reach  - Keep bed low and locked with side rails adjusted as appropriate  - Keep care items and personal belongings within reach  - Initiate and maintain comfort rounds  - Make Fall Risk Sign visible to staff  - Offer Toileting every 2 Hours, in advance of need  - Initiate/Maintain bed alarm  - Obtain necessary fall risk management equipment: Call bell  - Apply yellow socks and bracelet for high fall risk patients  - Consider moving patient to room near nurses station  Outcome: Progressing     Problem: MOBILITY - ADULT  Goal: Maintain or return to baseline ADL function  Description: INTERVENTIONS:  -  Assess patient's ability to carry out ADLs; assess patient's baseline for ADL function and identify physical deficits which impact ability to perform ADLs (bathing, care of mouth/teeth, toileting, grooming, dressing, etc )  - Assess/evaluate cause of self-care deficits   - Assess range of motion  - Assess patient's mobility; develop plan if impaired  - Assess patient's need for assistive devices and provide as appropriate  - Encourage maximum independence but intervene and supervise when necessary  - Involve family in performance of ADLs  - Assess for home care needs following discharge   - Consider OT consult to assist with ADL evaluation and planning for discharge  - Provide patient education as appropriate  Outcome: Progressing  Goal: Maintains/Returns to pre admission functional level  Description: INTERVENTIONS:  - Perform BMAT or MOVE assessment daily    - Set and communicate daily mobility goal to care team and patient/family/caregiver  - Collaborate with rehabilitation services on mobility goals if consulted  - Perform Range of Motion 3 times a day  - Reposition patient every 2 hours    - Dangle patient 3 times a day  - Stand patient 3 times a day  - Ambulate patient 3 times a day  - Out of bed to chair 3 times a day   - Out of bed for meals 3 times a day  - Out of bed for toileting  - Record patient progress and toleration of activity level   Outcome: Progressing     Problem: CARDIOVASCULAR - ADULT  Goal: Maintains optimal cardiac output and hemodynamic stability  Description: INTERVENTIONS:  - Monitor I/O, vital signs and rhythm  - Monitor for S/S and trends of decreased cardiac output  - Administer and titrate ordered vasoactive medications to optimize hemodynamic stability  - Assess quality of pulses, skin color and temperature  - Assess for signs of decreased coronary artery perfusion  - Instruct patient to report change in severity of symptoms  Outcome: Progressing  Goal: Absence of cardiac dysrhythmias or at baseline rhythm  Description: INTERVENTIONS:  - Continuous cardiac monitoring, vital signs, obtain 12 lead EKG if ordered  - Administer antiarrhythmic and heart rate control medications as ordered  - Monitor electrolytes and administer replacement therapy as ordered  Outcome: Progressing

## 2022-02-16 NOTE — DISCHARGE SUMMARY
[facility]  Discharge- Roselia Hubbard 1960, 63 y o  female MRN: [id number]  Unit/Bed#: E5 -01 Encounter: [id number]  Primary Care Provider: CHERELLE Calderon   Date and time admitted to hospital: 2/10/2022 12:46 PM    * 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 heart failure ([facility] [address] )  Assessment & Plan  Wt Readings from Last 3 Encounters:   02/16/22 125 kg (276 lb 7 3 oz)   02/10/22 (!) 137 kg (302 lb)   12/16/21 125 kg (276 lb 4 8 oz)     This is a 65-year-old female with history of CKD stage 4, depression, diabetes mellitus, hypertension, hyperlipidemia, CHF presented with progressive worsening dyspnea and lower extremity edema for the past 2 weeks  · Last ECHO 8/2021 with reduced EF and grade 2 diastolic dysfunction  · Status post IV diuresis, net output 8 67 liters  · Transitioned to PO torsemide, stable for discharge  · Follow up with nephrology and cardiology as an outpatient    Stage 4 chronic kidney disease ([facility] )  Assessment & Plan  · Acute renal failure on CKD, ALFRED has resolved  · Currently at new baseline  · Progression of CKD likely cardiorenal   · Nephrology and cardiology will follow outpatient    Anemia  Assessment & Plan  · Currently at baseline  Hemoglobin  · Likely anemia of chronic disease and CKD  · Monitor as an outpatient     CAD (coronary artery disease)  Assessment & Plan  · History of multiple stents to the LAD and recent stenting to the RCA and August 2021    · Continue dual anti-platelet with aspirin and Plavix, statin, Imdur or  · Coreg discontinued due to bradycardia     Type 2 diabetes mellitus with stage 4 chronic kidney disease, with long-term current use of insulin ([facility])  Assessment & Plan  Lab Results   Component Value Date    HGBA1C 8 5 (A) 12/16/2021     · Adequately controlled today  · Continue home regimen on discharge     Elevated troponin-resolved as of 2/15/2022  Assessment & Plan  · Non chest pain associated elevation in troponin secondary to chf exacerbation and joao  · No further intervention currently         Medical Problems             Resolved Problems  Date Reviewed: 2/14/2022          Resolved    Elevated troponin 2/15/2022     Resolved by  Deuce Ornelas MD              Discharging Physician / Practitioner: Vic Huffman MD  PCP: Lawson Garcia, [facility]   Admission Date:   Admission Orders (From admission, onward)     Ordered        02/10/22 1443  Inpatient Admission  Once                      Discharge Date: 02/16/22    Consultations During Hospital Stay:  [address]  · IP CONSULT TO NEPHROLOGY  ·     Procedures Performed:   · imaging    Significant Findings / Test Results:   Principal Problem:    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 heart failure (HCC)  Active Problems:    Type 2 diabetes mellitus with stage 4 chronic kidney disease, with long-term current use of insulin ([facility])    CAD (coronary artery disease)    Anemia    Stage 4 chronic kidney disease ([facility] [address] )  Resolved Problems:    Elevated troponin  ·   ·     Incidental Findings:   · See above      Test Results Pending at Discharge (will require follow up): · Na      Outpatient Tests Requested:  · Follow up with pcp, nephrology, cardiology     Complications:  Na     Reason for Admission: joao, decompensated heart failure     Hospital Course:   Jose Canada is a 63 y o  female patient who originally presented to the hospital on 2/10/2022 due to decompensated heart failure with acute kidney injury  Nephrology and cardiology were consulted  Patient underwent IV diuresis now stable for discharge on PO torsemide  Condition at Discharge: good    Discharge Day Visit / Exam:   * Please refer to separate progress note for these details *    Discussion with Family: Patient declined call to        Discharge instructions/Information to patient and family:   See after visit summary for information provided to patient and family  Provisions for Follow-Up Care:  See after visit summary for information related to follow-up care and any pertinent home health orders  Disposition:   Home    Planned Readmission: none      Discharge Statement:  I spent 30+ minutes discharging the patient  This time was spent on the day of discharge  I had direct contact with the patient on the day of discharge  Greater than 50% of the total time was spent examining patient, answering all patient questions, arranging and discussing plan of care with patient as well as directly providing post-discharge instructions  Additional time then spent on discharge activities  Discharge Medications:  See after visit summary for reconciled discharge medications provided to patient and/or family        **Please Note: This note may have been constructed using a voice recognition system**

## 2022-02-16 NOTE — ASSESSMENT & PLAN NOTE
· History of multiple stents to the LAD and recent stenting to the RCA and August 2021    · Continue dual anti-platelet with aspirin and Plavix, statin, Imdur or  · Coreg discontinued due to bradycardia

## 2022-02-16 NOTE — ASSESSMENT & PLAN NOTE
Lab Results   Component Value Date    HGBA1C 8 5 (A) 12/16/2021     · Adequately controlled today  · Continue home regimen on discharge

## 2022-02-16 NOTE — ASSESSMENT & PLAN NOTE
· Acute renal failure on CKD, ALFRED has resolved  · Currently at new baseline  · Progression of CKD likely cardiorenal   · Nephrology and cardiology will follow outpatient

## 2022-02-16 NOTE — ASSESSMENT & PLAN NOTE
· Currently at baseline  Hemoglobin  · Likely anemia of chronic disease and CKD  · Monitor as an outpatient

## 2022-02-16 NOTE — PLAN OF CARE
Problem: GENITOURINARY - ADULT  Goal: Maintains or returns to baseline urinary function  Description: INTERVENTIONS:  - Assess urinary function  - Encourage oral fluids to ensure adequate hydration if ordered  - Administer IV fluids as ordered to ensure adequate hydration  - Administer ordered medications as needed  - Offer frequent toileting  - Follow urinary retention protocol if ordered  Outcome: Progressing  Goal: Absence of urinary retention  Description: INTERVENTIONS:  - Assess patients ability to void and empty bladder  - Monitor I/O  - Bladder scan as needed  - Discuss with physician/AP medications to alleviate retention as needed  - Discuss catheterization for long term situations as appropriate  Outcome: Progressing  Goal: Urinary catheter remains patent  Description: INTERVENTIONS:  - Assess patency of urinary catheter  - If patient has a chronic morales, consider changing catheter if non-functioning  - Follow guidelines for intermittent irrigation of non-functioning urinary catheter  Outcome: Progressing     Problem: METABOLIC, FLUID AND ELECTROLYTES - ADULT  Goal: Electrolytes maintained within normal limits  Description: INTERVENTIONS:  - Monitor labs and assess patient for signs and symptoms of electrolyte imbalances  - Administer electrolyte replacement as ordered  - Monitor response to electrolyte replacements, including repeat lab results as appropriate  - Instruct patient on fluid and nutrition as appropriate  Outcome: Progressing  Goal: Fluid balance maintained  Description: INTERVENTIONS:  - Monitor labs   - Monitor I/O and WT  - Instruct patient on fluid and nutrition as appropriate  - Assess for signs & symptoms of volume excess or deficit  Outcome: Progressing  Goal: Glucose maintained within target range  Description: INTERVENTIONS:  - Monitor Blood Glucose as ordered  - Assess for signs and symptoms of hyperglycemia and hypoglycemia  - Administer ordered medications to maintain glucose within target range  - Assess nutritional intake and initiate nutrition service referral as needed  Outcome: Progressing     Problem: MUSCULOSKELETAL - ADULT  Goal: Maintain or return mobility to safest level of function  Description: INTERVENTIONS:  - Assess patient's ability to carry out ADLs; assess patient's baseline for ADL function and identify physical deficits which impact ability to perform ADLs (bathing, care of mouth/teeth, toileting, grooming, dressing, etc )  - Assess/evaluate cause of self-care deficits   - Assess range of motion  - Assess patient's mobility  - Assess patient's need for assistive devices and provide as appropriate  - Encourage maximum independence but intervene and supervise when necessary  - Involve family in performance of ADLs  - Assess for home care needs following discharge   - Consider OT consult to assist with ADL evaluation and planning for discharge  - Provide patient education as appropriate  Outcome: Progressing  Goal: Maintain proper alignment of affected body part  Description: INTERVENTIONS:  - Support, maintain and protect limb and body alignment  - Provide patient/ family with appropriate education  Outcome: Progressing     Problem: Potential for Falls  Goal: Patient will remain free of falls  Description: INTERVENTIONS:  - Educate patient/family on patient safety including physical limitations  - Instruct patient to call for assistance with activity   - Consult OT/PT to assist with strengthening/mobility   - Keep Call bell within reach  - Keep bed low and locked with side rails adjusted as appropriate  - Keep care items and personal belongings within reach  - Initiate and maintain comfort rounds  - Make Fall Risk Sign visible to staff  - Offer Toileting every 2 Hours, in advance of need  - Initiate/Maintain bed alarm  - Obtain necessary fall risk management equipment:   - Apply yellow socks and bracelet for high fall risk patients  - Consider moving patient to room near nurses station  Outcome: Progressing     Problem: MOBILITY - ADULT  Goal: Maintain or return to baseline ADL function  Description: INTERVENTIONS:  -  Assess patient's ability to carry out ADLs; assess patient's baseline for ADL function and identify physical deficits which impact ability to perform ADLs (bathing, care of mouth/teeth, toileting, grooming, dressing, etc )  - Assess/evaluate cause of self-care deficits   - Assess range of motion  - Assess patient's mobility; develop plan if impaired  - Assess patient's need for assistive devices and provide as appropriate  - Encourage maximum independence but intervene and supervise when necessary  - Involve family in performance of ADLs  - Assess for home care needs following discharge   - Consider OT consult to assist with ADL evaluation and planning for discharge  - Provide patient education as appropriate  Outcome: Progressing  Goal: Maintains/Returns to pre admission functional level  Description: INTERVENTIONS:  - Perform BMAT or MOVE assessment daily    - Set and communicate daily mobility goal to care team and patient/family/caregiver  - Collaborate with rehabilitation services on mobility goals if consulted  - Perform Range of Motion 5 times a day  - Reposition patient every 2 hours    - Dangle patient 3 times a day  - Stand patient 3 times a day  - Ambulate patient 3 times a day  - Out of bed to chair 3 times a day   - Out of bed for meals 3 times a day  - Out of bed for toileting  - Record patient progress and toleration of activity level   Outcome: Progressing     Problem: CARDIOVASCULAR - ADULT  Goal: Maintains optimal cardiac output and hemodynamic stability  Description: INTERVENTIONS:  - Monitor I/O, vital signs and rhythm  - Monitor for S/S and trends of decreased cardiac output  - Administer and titrate ordered vasoactive medications to optimize hemodynamic stability  - Assess quality of pulses, skin color and temperature  - Assess for signs of decreased coronary artery perfusion  - Instruct patient to report change in severity of symptoms  Outcome: Progressing  Goal: Absence of cardiac dysrhythmias or at baseline rhythm  Description: INTERVENTIONS:  - Continuous cardiac monitoring, vital signs, obtain 12 lead EKG if ordered  - Administer antiarrhythmic and heart rate control medications as ordered  - Monitor electrolytes and administer replacement therapy as ordered  Outcome: Progressing

## 2022-02-16 NOTE — NURSING NOTE
Discharge instructions reviewed with patient  Belongings reviewed and taken with  IV and Harryimo removed  SP bag changed to leg bag for transport  Patient discharged via wheelchair with PCA at side

## 2022-02-16 NOTE — PROGRESS NOTES
NEPHROLOGY PROGRESS NOTE   Oscar Loomis 63 y o  female MRN: [id number]  Unit/Bed#: E5 -01 Encounter: [id number]  Reason for Consult: ALFRED      SUMMARY:    45-year-old female with past history of progressive Chronic Kidney Disease IV, uncontrolled diabetes, systolic/diastolic CHF, hypertension who presented with worsening shortness of breath, leg edema and weight gain   Nephrology consulted for acute kidney injury on top of Chronic Kidney Disease     ASSESSMENT and PLAN:     Acute kidney injury--stabilized and improved  --admission creatinine 3 1 mg/dL  --creatinine now plateaued in the high 2s to low threes  --will likely need to accept a higher baseline creatinine to keep out of volume overload and congestive heart failure  --she has underlying chronic kidney disease and have poor renal reserve  --nonoliguric, chronic suprapubic catheter  --renal function stable at 2 73 mg/dL  --transition to torsemide  --stable from renal standpoint for discharge, discussed with hospital  --stable from renal standpoint for discharge, message sent to the office for follow-up     Chronic kidney disease stage IV  --outpatient nephrologist Dr Roach  --likely be at a new baseline creatinine, historically had been in the mid 2s  --suspect she will need to be in the high 2s to low threes to maintain close to euvolemic status     Acute on chronic combined congestive heart failure  --combined with cardiorenal  --echocardiogram shows ejection fraction 45% with grade 2 diastolic dysfunction  --volume status improving  --clinically feeling better  --continue torsemide     Anemia  --chronic  --hemoglobin stable below goal     Hypertension  --volume mediated  --controlled     Hypokalemia  --transitioning IV diuretics to oral diuretic  --potassium replaced      SUBJECTIVE / INTERVAL HISTORY:    No chest pain or shortness of breath    OBJECTIVE:  Current Weight: Weight - Scale: 125 kg (276 lb 7 3 oz)  Vitals:    02/16/22 0622 02/16/22 0725 02/16/22 0847 02/16/22 1110   BP: 129/56 149/59 130/53 135/63   Pulse: 57 65 59 65   Resp:  18     Temp:  97 6 °F (36 4 °C)  97 6 °F (36 4 °C)   TempSrc:       SpO2: 95% 95% 93% 97%   Weight:       Height:           Intake/Output Summary (Last 24 hours) at 2/16/2022 1137  Last data filed at 2/16/2022 0530  Gross per 24 hour   Intake 180 ml   Output 1350 ml   Net -1170 ml       Review of Systems:    12 point ROS has been reviewed  Physical Exam  Vitals and nursing note reviewed  Constitutional:       General: She is not in acute distress  Appearance: She is well-developed  HENT:      Head: Normocephalic and atraumatic  Eyes:      General: No scleral icterus  Conjunctiva/sclera: Conjunctivae normal       Pupils: Pupils are equal, round, and reactive to light  Cardiovascular:      Rate and Rhythm: Normal rate and regular rhythm  Heart sounds: S1 normal and S2 normal  No murmur heard  No friction rub  No gallop  Pulmonary:      Effort: Pulmonary effort is normal  No respiratory distress  Breath sounds: Normal breath sounds  No wheezing or rales  Abdominal:      General: Bowel sounds are normal       Palpations: Abdomen is soft  Tenderness: There is no abdominal tenderness  There is no rebound  Genitourinary:     Comments: Chronic suprapubic catheter with yellow urine slightly cloudy  Musculoskeletal:         General: Normal range of motion  Cervical back: Normal range of motion and neck supple  Comments: Trace edema   Skin:     Findings: No rash  Neurological:      Mental Status: She is alert and oriented to person, place, and time     Psychiatric:         Behavior: Behavior normal          Medications:    Current Facility-Administered Medications:     acetaminophen (TYLENOL) tablet 650 mg, 650 mg, Oral, Q4H PRN, Vilma Alanis PA-C, 650 mg at 02/16/22 1117    allopurinol (ZYLOPRIM) tablet 100 mg, 100 mg, Oral, Daily, Karyn Marley MD, 100 mg at 02/16/22 0850    aspirin (ECOTRIN LOW STRENGTH) EC tablet 81 mg, 81 mg, Oral, Daily, Jaki Meyer MD, 81 mg at 02/16/22 0849    atorvastatin (LIPITOR) tablet 40 mg, 40 mg, Oral, After Trina Hazel MD, 40 mg at 02/15/22 1724    citalopram (CeleXA) tablet 40 mg, 40 mg, Oral, Daily, Nandini Ramires MD, 40 mg at 02/16/22 0850    clopidogrel (PLAVIX) tablet 75 mg, 75 mg, Oral, Daily, Nandini Ramires MD, 75 mg at 02/16/22 2189    ferrous sulfate tablet 325 mg, 325 mg, Oral, Daily With Breakfast, CHERELLE Abernathy, 325 mg at 02/16/22 0736    gabapentin (NEURONTIN) capsule 300 mg, 300 mg, Oral, BID, Nandini Ramires MD, 300 mg at 02/16/22 0849    heparin (porcine) subcutaneous injection 5,000 Units, 5,000 Units, Subcutaneous, Q8H [facility], 5,000 Units at 02/16/22 0526 **AND** [COMPLETED] Platelet count, , , Once, Nandini Ramires MD    HYDROcodone-acetaminophen [facility]) 5-325 mg per tablet 1 tablet, 1 tablet, Oral, Q8H PRN, Farrukh Lopez PA-C, 1 tablet at 02/15/22 2253    insulin glargine (LANTUS) subcutaneous injection 35 Units 0 35 mL, 35 Units, Subcutaneous, HS, Althea Camacho MD, 35 Units at 02/15/22 2244    insulin lispro (HumaLOG) 100 units/mL subcutaneous injection 15 Units, 15 Units, Subcutaneous, TID With Meals, Nandini Ramires MD, 15 Units at 02/16/22 1118    insulin lispro (HumaLOG) 100 units/mL subcutaneous injection 2-12 Units, 2-12 Units, Subcutaneous, TID AC, 4 Units at 02/16/22 1118 **AND** Fingerstick Glucose (POCT), , , TID AC, Nandini Ramires MD    isosorbide mononitrate (IMDUR) 24 hr tablet 60 mg, 60 mg, Oral, Daily, Nandini Ramires MD, 60 mg at 02/16/22 8178    levothyroxine tablet 50 mcg, 50 mcg, Oral, Daily, Nandini Ramires MD, 50 mcg at 02/16/22 0526    OLANZapine (ZyPREXA) tablet 5 mg, 5 mg, Oral, HS, Nandini Ramires MD, 5 mg at 02/15/22 2244    pantoprazole (PROTONIX) EC tablet 40 mg, 40 mg, Oral, BID, Chaparrita Hope MD, 40 mg at 02/16/22 5978    potassium chloride (K-DUR,KLOR-CON) CR tablet 40 mEq, 40 mEq, Oral, BID, Pham Soto MD, 40 mEq at 02/16/22 0849    sodium chloride (OCEAN) 0 65 % nasal spray 1 spray, 1 spray, Each Nare, Q1H PRN, Shandra Tompkins MD, 1 spray at 02/13/22 0921    tiZANidine (ZANAFLEX) tablet 2 mg, 2 mg, Oral, Q8H PRN, Chaparrita Hope MD, 2 mg at 02/12/22 0520    torsemide [facility]) tablet 40 mg, 40 mg, Oral, Daily, Ryan Arguelles MD, 40 mg at 02/16/22 9274    Laboratory Results:  Results from last 7 days   Lab Units 02/16/22  0841 02/15/22  0449 02/14/22  0523 02/13/22  0537 02/12/22  0625 02/11/22  0633 02/10/22  1817 02/10/22  1324   WBC Thousand/uL  --  7 34 5 72 7 04 7 11 7 79  --  11 06*   HEMOGLOBIN g/dL  --  8 8* 7 8* 8 2* 8 0* 7 6*  --  9 1*   HEMATOCRIT %  --  27 1* 25 1* 25 5* 24 6* 23 6*  --  28 1*   PLATELETS Thousands/uL  --  225 167 204 192 186 219 227   POTASSIUM mmol/L 3 8 3 3* 3 8 3 9 3 8 3 8  --  4 5   CHLORIDE mmol/L 103 103 103 106 108 108  --  107   CO2 mmol/L 28 28 26 26 24 25  --  21   BUN mg/dL 86* 88* 95* 90* 82* 80*  --  84*   CREATININE mg/dL 2 73* 2 79* 2 94* 2 93* 2 86* 3 07*  --  3 13*   CALCIUM mg/dL 8 7 8 8 8 8 8 5 8 7 8 3  --  8 1*   MAGNESIUM mg/dL 1 8  --  2 0  --  1 9  --   --   --

## 2022-02-17 ENCOUNTER — TRANSITIONAL CARE MANAGEMENT (OUTPATIENT)
Dept: FAMILY MEDICINE CLINIC | Facility: CLINIC | Age: 62
End: 2022-02-17

## 2022-02-17 ENCOUNTER — PATIENT OUTREACH (OUTPATIENT)
Dept: FAMILY MEDICINE CLINIC | Facility: CLINIC | Age: 62
End: 2022-02-17

## 2022-02-17 DIAGNOSIS — N18.4 STAGE 4 CHRONIC KIDNEY DISEASE (HCC): Primary | ICD-10-CM

## 2022-02-17 NOTE — PROGRESS NOTES
I called the patient to let her know an antibiotic was ordered for her and that she should call [facility] to inform them; she agreed  She did ask for Hydrocodone for the pain  Chart reviewed showing an order was placed in December for #90  I asked her to see if she has any pills remaining before we would order more  She was very thankful

## 2022-02-17 NOTE — PROGRESS NOTES
I called the patient to follow up after her recent hospitalization  She states she is not feeling well as she believes she has a UTI  She started with symptoms yesterday of a "burning feeling" and urgency  She denies fever but admits to right flank pain; will send note to PCP  Even with the [facility] she feels the sense of urgency  She believes it happened because the hospital limited her fluid intake  The patient was not up for more questions  I did remind her of her Cardiology appointment for tomorrow  She would like to attend and hopes to feel better by then  I will follow up next week  Chart reviewed

## 2022-02-21 ENCOUNTER — PATIENT OUTREACH (OUTPATIENT)
Dept: FAMILY MEDICINE CLINIC | Facility: CLINIC | Age: 62
End: 2022-02-21

## 2022-02-21 DIAGNOSIS — K21.9 GASTROESOPHAGEAL REFLUX DISEASE WITHOUT ESOPHAGITIS: ICD-10-CM

## 2022-02-21 DIAGNOSIS — Z79.4 CURRENT USE OF INSULIN (HCC): ICD-10-CM

## 2022-02-21 DIAGNOSIS — I25.10 CORONARY ARTERY DISEASE INVOLVING NATIVE HEART WITHOUT ANGINA PECTORIS, UNSPECIFIED VESSEL OR LESION TYPE: ICD-10-CM

## 2022-02-21 DIAGNOSIS — Z79.4 TYPE 2 DIABETES MELLITUS WITH DIABETIC POLYNEUROPATHY, WITH LONG-TERM CURRENT USE OF INSULIN (HCC): ICD-10-CM

## 2022-02-21 DIAGNOSIS — Z98.1 S/P CERVICAL SPINAL FUSION: ICD-10-CM

## 2022-02-21 DIAGNOSIS — E11.42 TYPE 2 DIABETES MELLITUS WITH DIABETIC POLYNEUROPATHY, WITH LONG-TERM CURRENT USE OF INSULIN (HCC): ICD-10-CM

## 2022-02-21 DIAGNOSIS — D64.9 NORMOCHROMIC NORMOCYTIC ANEMIA: ICD-10-CM

## 2022-02-21 DIAGNOSIS — N18.32 STAGE 3B CHRONIC KIDNEY DISEASE (HCC): ICD-10-CM

## 2022-02-21 RX ORDER — PANTOPRAZOLE SODIUM 40 MG/1
TABLET, DELAYED RELEASE ORAL
Qty: 180 TABLET | Refills: 1 | Status: SHIPPED | OUTPATIENT
Start: 2022-02-21

## 2022-02-21 RX ORDER — TIZANIDINE 2 MG/1
TABLET ORAL
Qty: 90 TABLET | Refills: 0 | Status: SHIPPED | OUTPATIENT
Start: 2022-02-21 | End: 2022-02-25

## 2022-02-21 RX ORDER — BLOOD SUGAR DIAGNOSTIC
1 STRIP MISCELLANEOUS DAILY
Qty: 100 STRIP | Refills: 2 | Status: SHIPPED | OUTPATIENT
Start: 2022-02-21

## 2022-02-21 RX ORDER — GABAPENTIN 600 MG/1
TABLET ORAL
Qty: 120 TABLET | Refills: 2 | Status: SHIPPED | OUTPATIENT
Start: 2022-02-21 | End: 2022-03-29

## 2022-02-21 RX ORDER — FERROUS SULFATE 325(65) MG
TABLET ORAL
Qty: 45 TABLET | Refills: 1 | Status: SHIPPED | OUTPATIENT
Start: 2022-02-21 | End: 2022-03-29 | Stop reason: SDUPTHER

## 2022-02-22 ENCOUNTER — TELEPHONE (OUTPATIENT)
Dept: PREADMISSION TESTING | Facility: HOSPITAL | Age: 62
End: 2022-02-22

## 2022-02-22 ENCOUNTER — OFFICE VISIT (OUTPATIENT)
Dept: CARDIOLOGY CLINIC | Facility: CLINIC | Age: 62
End: 2022-02-22
Payer: COMMERCIAL

## 2022-02-22 ENCOUNTER — OFFICE VISIT (OUTPATIENT)
Dept: FAMILY MEDICINE CLINIC | Facility: CLINIC | Age: 62
End: 2022-02-22

## 2022-02-22 VITALS
DIASTOLIC BLOOD PRESSURE: 62 MMHG | SYSTOLIC BLOOD PRESSURE: 126 MMHG | OXYGEN SATURATION: 96 % | BODY MASS INDEX: 42.25 KG/M2 | HEART RATE: 82 BPM | HEIGHT: 68 IN | WEIGHT: 278.8 LBS

## 2022-02-22 VITALS
HEIGHT: 68 IN | HEART RATE: 91 BPM | RESPIRATION RATE: 18 BRPM | DIASTOLIC BLOOD PRESSURE: 80 MMHG | WEIGHT: 277 LBS | TEMPERATURE: 96.5 F | BODY MASS INDEX: 41.98 KG/M2 | OXYGEN SATURATION: 95 % | SYSTOLIC BLOOD PRESSURE: 142 MMHG

## 2022-02-22 DIAGNOSIS — E78.2 MIXED HYPERLIPIDEMIA: ICD-10-CM

## 2022-02-22 DIAGNOSIS — I25.10 CORONARY ARTERY DISEASE INVOLVING NATIVE CORONARY ARTERY OF NATIVE HEART WITHOUT ANGINA PECTORIS: Primary | ICD-10-CM

## 2022-02-22 DIAGNOSIS — Z98.1 S/P CERVICAL SPINAL FUSION: ICD-10-CM

## 2022-02-22 DIAGNOSIS — N18.4 TYPE 2 DIABETES MELLITUS WITH STAGE 4 CHRONIC KIDNEY DISEASE, WITH LONG-TERM CURRENT USE OF INSULIN (HCC): ICD-10-CM

## 2022-02-22 DIAGNOSIS — M17.12 PRIMARY OSTEOARTHRITIS OF LEFT KNEE: ICD-10-CM

## 2022-02-22 DIAGNOSIS — Z09 ENCOUNTER FOR EXAMINATION FOLLOWING TREATMENT AT HOSPITAL: Primary | ICD-10-CM

## 2022-02-22 DIAGNOSIS — E66.01 MORBID OBESITY WITH BMI OF 45.0-49.9, ADULT (HCC): ICD-10-CM

## 2022-02-22 DIAGNOSIS — I50.43 ACUTE ON CHRONIC COMBINED SYSTOLIC AND DIASTOLIC CONGESTIVE HEART FAILURE (HCC): ICD-10-CM

## 2022-02-22 DIAGNOSIS — E11.22 TYPE 2 DIABETES MELLITUS WITH STAGE 4 CHRONIC KIDNEY DISEASE, WITH LONG-TERM CURRENT USE OF INSULIN (HCC): ICD-10-CM

## 2022-02-22 DIAGNOSIS — G89.29 CHRONIC LEFT SHOULDER PAIN: ICD-10-CM

## 2022-02-22 DIAGNOSIS — N18.4 STAGE 4 CHRONIC KIDNEY DISEASE (HCC): ICD-10-CM

## 2022-02-22 DIAGNOSIS — I10 PRIMARY HYPERTENSION: ICD-10-CM

## 2022-02-22 DIAGNOSIS — Z79.4 TYPE 2 DIABETES MELLITUS WITH STAGE 4 CHRONIC KIDNEY DISEASE, WITH LONG-TERM CURRENT USE OF INSULIN (HCC): ICD-10-CM

## 2022-02-22 DIAGNOSIS — M25.512 CHRONIC LEFT SHOULDER PAIN: ICD-10-CM

## 2022-02-22 PROBLEM — Z78.9 NEED FOR FOLLOW-UP BY SOCIAL WORKER: Status: RESOLVED | Noted: 2020-11-11 | Resolved: 2022-02-22

## 2022-02-22 PROCEDURE — 1036F TOBACCO NON-USER: CPT

## 2022-02-22 PROCEDURE — 1111F DSCHRG MED/CURRENT MED MERGE: CPT

## 2022-02-22 PROCEDURE — 3008F BODY MASS INDEX DOCD: CPT

## 2022-02-22 PROCEDURE — 3074F SYST BP LT 130 MM HG: CPT

## 2022-02-22 PROCEDURE — 99496 TRANSJ CARE MGMT HIGH F2F 7D: CPT

## 2022-02-22 PROCEDURE — 3078F DIAST BP <80 MM HG: CPT

## 2022-02-22 PROCEDURE — 99214 OFFICE O/P EST MOD 30 MIN: CPT

## 2022-02-22 RX ORDER — BISOPROLOL FUMARATE 5 MG/1
5 TABLET ORAL DAILY
Qty: 90 TABLET | Refills: 3 | Status: SHIPPED | OUTPATIENT
Start: 2022-02-22 | End: 2022-07-02

## 2022-02-22 RX ORDER — HYDROCODONE BITARTRATE AND ACETAMINOPHEN 5; 325 MG/1; MG/1
1 TABLET ORAL 3 TIMES DAILY PRN
Qty: 90 TABLET | Refills: 0 | Status: SHIPPED | OUTPATIENT
Start: 2022-02-22 | End: 2022-03-29 | Stop reason: SDUPTHER

## 2022-02-22 NOTE — PROGRESS NOTES
Assessment/Plan:    Encounter for examination following treatment at hospital  Wt Readings from Last 3 Encounters:   02/22/22 126 kg (277 lb)   02/16/22 125 kg (276 lb 7 3 oz)   02/10/22 (!) 137 kg (302 lb)     Reports compliance with all her medications  She is seeing cardiology later today   & nephrology in about 2 weeks for her CKD stage 4  -Reviewed the importance of monitoring weights at home & reporting an increase of 2-3 lbs in one day or 5 lbs in one week       Diagnoses and all orders for this visit:    Encounter for examination following treatment at hospital    S/P cervical spinal fusion  -     HYDROcodone-acetaminophen (NORCO) 5-325 mg per tablet; Take 1 tablet by mouth 3 (three) times a day as needed for pain For ongoing pain Max Daily Amount: 3 tablets    Primary osteoarthritis of left knee  -     HYDROcodone-acetaminophen (NORCO) 5-325 mg per tablet; Take 1 tablet by mouth 3 (three) times a day as needed for pain For ongoing pain Max Daily Amount: 3 tablets    Chronic left shoulder pain  -     XR shoulder 2+ vw left; Future          Subjective:      Patient ID: Samia Garcia is a 63 y o  female  Samia Garcia is a 63 y o  female  has a past medical history of Abnormal liver function, Anemia, Anxiety, Arthritis, Chronic kidney disease, Chronic narcotic dependence ([address] ), Chronic pain disorder, Coronary artery disease, Depression, Diabetes mellitus ([address] ), Elevated troponin, GERD (gastroesophageal reflux disease), Heart murmur, Hyperlipidemia, Hypertension, Neurogenic bladder, Open toe wound, Renal disorder, Shortness of breath, Sleep apnea, and Suprapubic catheter ([address] )  has a past surgical history that includes Knee surgery; Carpal tunnel release (Bilateral); Trigger finger release (Right); Toe amputation (Left); Hysterectomy (2008); Oophorectomy (2008); Breast excisional biopsy (Left); Amputation; Breast surgery; Cardiac catheterization; Cervical fusion;  Angioplasty (2017); pr exc skin benig 3 1-4 cm remaindr body (N/A, 12/21/2020); and IR suprapubic catheter placement (6/15/2021)  She presents today for a TCM follow-up   TCM Call (since 1/22/2022)     Date and time call was made  2/17/2022 11:51 AM    Hospital care reviewed  Records not available        [facility]        Date of Admission  02/10/22    Date of discharge  02/16/22    Diagnosis  congestive heart failure    Disposition  Home    Were the patients medications reviewed and updated  Yes    Current Symptoms  None      TCM Call (since 1/22/2022)     Post hospital issues  None    Scheduled for follow up? Yes    Did you obtain your prescribed medications  Yes    Do you need help managing your prescriptions or medications  No    Is transportation to your appointment needed  No    I have advised the patient to call PCP with any new or worsening symptoms    [facility] [place] Hospital Course:   Jacolyn Sever is a 63 y o  female patient who originally presented to the hospital on 2/10/2022 due to decompensated heart failure with acute kidney injury  Nephrology and cardiology were consulted  Patient underwent IV diuresis now stable for discharge on PO torsemide  The following portions of the patient's history were reviewed and updated as appropriate: allergies, current medications, past family history, past medical history, past social history, past surgical history and problem list     Review of Systems   Constitutional: Negative for chills and fever  HENT: Negative for ear pain and sore throat  Eyes: Negative for pain and visual disturbance  Respiratory: Negative for cough and shortness of breath  Cardiovascular: Negative for chest pain and palpitations  Gastrointestinal: Negative for abdominal pain and vomiting  Genitourinary: Negative for dysuria and hematuria     Musculoskeletal: Negative for arthralgias and back pain    Skin: Negative for color change and rash  Neurological: Negative for seizures and syncope  All other systems reviewed and are negative  Objective:      /80 (BP Location: Left arm, Patient Position: Sitting, Cuff Size: Large)   Pulse 91   Temp (!) 96 5 °F (35 8 °C) (Temporal)   Resp 18   Ht 5' 8" (1 727 m)   Wt 126 kg (277 lb)   SpO2 95%   BMI 42 12 kg/m²          Physical Exam  Vitals and nursing note reviewed  Constitutional:       Appearance: She is obese  HENT:      Head: Normocephalic and atraumatic  Right Ear: External ear normal       Left Ear: External ear normal       Nose: Nose normal    Eyes:      Extraocular Movements: Extraocular movements intact  Conjunctiva/sclera: Conjunctivae normal       Pupils: Pupils are equal, round, and reactive to light  Cardiovascular:      Rate and Rhythm: Normal rate and regular rhythm  Pulses: Normal pulses  Heart sounds: Murmur heard  Pulmonary:      Effort: Pulmonary effort is normal       Breath sounds: Normal breath sounds  Abdominal:      General: Bowel sounds are normal       Palpations: Abdomen is soft  Tenderness: There is no abdominal tenderness  Musculoskeletal:         General: Normal range of motion  Cervical back: Normal range of motion  Skin:     General: Skin is warm and dry  Neurological:      General: No focal deficit present  Mental Status: She is alert and oriented to person, place, and time  Mental status is at baseline  Psychiatric:         Mood and Affect: Mood normal          Behavior: Behavior normal          Thought Content:  Thought content normal

## 2022-02-22 NOTE — ASSESSMENT & PLAN NOTE
Wt Readings from Last 3 Encounters:   02/22/22 126 kg (277 lb)   02/16/22 125 kg (276 lb 7 3 oz)   02/10/22 (!) 137 kg (302 lb)     Reports compliance with all her medications     She is seeing cardiology later today   & nephrology in about 2 weeks for her CKD stage 4  -Reviewed the importance of monitoring weights at home & reporting an increase of 2-3 lbs in one day or 5 lbs in one week

## 2022-02-22 NOTE — PROGRESS NOTES
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 Known Problems Daughter     No Known Problems Maternal Grandmother     No Known Problems Maternal Grandfather     No Known Problems Paternal Grandmother     No Known Problems Paternal Grandfather     No Known Problems Maternal Aunt     No Known Problems Maternal Aunt     No Known Problems Maternal Aunt     No Known Problems Maternal Aunt     No Known Problems Maternal Aunt     No Known Problems Maternal Aunt     No Known Problems Paternal Aunt        Lab Results   Component Value Date    WBC 7 34 02/15/2022    HGB 8 8 (L) 02/15/2022    HCT 27 1 (L) 02/15/2022    MCV 97 02/15/2022     02/15/2022      Lab Results   Component Value Date    SODIUM 139 02/16/2022    K 3 8 02/16/2022     02/16/2022    CO2 28 02/16/2022    BUN 86 (H) 02/16/2022    CREATININE 2 73 (H) 02/16/2022    GLUC 217 (H) 02/16/2022    CALCIUM 8 7 02/16/2022      Lab Results   Component Value Date    HGBA1C 8 5 (A) 12/16/2021      No results found for: CHOL  Lab Results   Component Value Date    HDL 30 (L) 08/03/2021    HDL 45 12/18/2020     Lab Results   Component Value Date    LDLCALC 86 08/03/2021    LDLCALC 122 12/18/2020     Lab Results   Component Value Date    TRIG 180 (H) 08/03/2021    TRIG 162 (H) 12/18/2020     No results found for: [place], [place]   Lab Results   Component Value Date    INR 1 03 08/23/2021    INR 0 93 05/26/2021    PROTIME 13 6 08/23/2021    PROTIME 12 3 05/26/2021          Patient Active Problem List    Diagnosis Date Noted    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 heart failure ([facility] ) 08/24/2021    Prolonged QT interval 08/24/2021    Urinary tract infection associated with cystostomy catheter ([facility] ) 08/23/2021    Neurogenic bladder 08/23/2021    Morbid obesity with BMI of 45 0-49 9, adult ([facility] ) 06/15/2021    History of heart artery stent 06/15/2021    Memory impairment 05/26/2021    Chronic bronchitis ([facility] ) 05/25/2021    Severe episode of recurrent major depressive disorder, without psychotic features ([facility] ) 05/25/2021    Skin ulcer of hand, limited to breakdown of skin ([facility] ) 02/25/2021    HTN (hypertension) 12/21/2020    Diabetic ulcer of toe of right foot associated with type 2 diabetes mellitus, with muscle involvement without evidence of necrosis ([facility] ) 11/25/2020    Head lump 11/11/2020    Anemia 09/09/2020    Abnormal LFTs 09/09/2020    S/P cervical spinal fusion 09/09/2020    Major depressive disorder 09/02/2020    Type 2 diabetes mellitus with stage 4 chronic kidney disease, with long-term current use of insulin ([facility] ) 09/02/2020    Anxiety 09/02/2020    CAD (coronary artery disease) 09/02/2020    Osteoarthritis of left knee 09/02/2020    Cellulitis of finger of right hand 08/26/2020    Encounter for examination following treatment at hospital 02/22/2022    Other artificial openings of urinary tract status ([facility] ) 02/10/2022    Continuous opioid dependence ([facility] ) 02/10/2022    Adrenal nodule ([facility] ) 02/10/2022    Stable angina ([facility] ) 02/10/2022    Volume overload 02/10/2022    Acquired hypothyroidism 10/14/2021    Mixed hyperlipidemia 10/14/2021    Gastroesophageal reflux disease without esophagitis 10/13/2021    Other proteinuria 09/14/2021    CKD (chronic kidney disease) 09/14/2021    Stage 4 chronic kidney disease ([facility] ) 06/04/2021       Portions of the record may have been created with voice recognition software  Occasional wrong word or "sound a like" substitutions may have occurred due to the inherent limitations of voice recognition software  Read the chart carefully and recognize, using context, where substitutions have occurred      Sherren Reasoner, DO, [facility]  2/22/2022 4:22 PM

## 2022-02-24 ENCOUNTER — APPOINTMENT (OUTPATIENT)
Dept: LAB | Facility: HOSPITAL | Age: 62
End: 2022-02-24
Attending: STUDENT IN AN ORGANIZED HEALTH CARE EDUCATION/TRAINING PROGRAM
Payer: COMMERCIAL

## 2022-02-24 DIAGNOSIS — N18.4 STAGE 4 CHRONIC KIDNEY DISEASE (HCC): ICD-10-CM

## 2022-02-24 DIAGNOSIS — I50.43 ACUTE ON CHRONIC COMBINED SYSTOLIC AND DIASTOLIC CONGESTIVE HEART FAILURE (HCC): ICD-10-CM

## 2022-02-24 LAB
ANION GAP SERPL CALCULATED.3IONS-SCNC: 7 MMOL/L (ref 5–14)
BUN SERPL-MCNC: 69 MG/DL (ref 5–25)
CALCIUM SERPL-MCNC: 8.5 MG/DL (ref 8.4–10.2)
CHLORIDE SERPL-SCNC: 107 MMOL/L (ref 97–108)
CO2 SERPL-SCNC: 28 MMOL/L (ref 22–30)
CREAT SERPL-MCNC: 3.47 MG/DL (ref 0.6–1.2)
GFR SERPL CREATININE-BSD FRML MDRD: 13 ML/MIN/1.73SQ M
GLUCOSE P FAST SERPL-MCNC: 216 MG/DL (ref 70–99)
POTASSIUM SERPL-SCNC: 4.8 MMOL/L (ref 3.6–5)
SODIUM SERPL-SCNC: 142 MMOL/L (ref 137–147)

## 2022-02-24 PROCEDURE — 80048 BASIC METABOLIC PNL TOTAL CA: CPT

## 2022-02-24 PROCEDURE — 36415 COLL VENOUS BLD VENIPUNCTURE: CPT

## 2022-02-25 DIAGNOSIS — Z79.4 CURRENT USE OF INSULIN (HCC): ICD-10-CM

## 2022-02-25 DIAGNOSIS — E11.42 TYPE 2 DIABETES MELLITUS WITH DIABETIC POLYNEUROPATHY, WITH LONG-TERM CURRENT USE OF INSULIN (HCC): ICD-10-CM

## 2022-02-25 DIAGNOSIS — Z98.1 S/P CERVICAL SPINAL FUSION: ICD-10-CM

## 2022-02-25 DIAGNOSIS — I20.8 STABLE ANGINA (HCC): ICD-10-CM

## 2022-02-25 DIAGNOSIS — I25.10 CORONARY ARTERY DISEASE INVOLVING NATIVE HEART WITHOUT ANGINA PECTORIS, UNSPECIFIED VESSEL OR LESION TYPE: ICD-10-CM

## 2022-02-25 DIAGNOSIS — N18.32 STAGE 3B CHRONIC KIDNEY DISEASE (HCC): ICD-10-CM

## 2022-02-25 DIAGNOSIS — Z79.4 TYPE 2 DIABETES MELLITUS WITH DIABETIC POLYNEUROPATHY, WITH LONG-TERM CURRENT USE OF INSULIN (HCC): ICD-10-CM

## 2022-02-25 RX ORDER — CITALOPRAM 40 MG/1
TABLET ORAL
Qty: 90 TABLET | Refills: 0 | Status: SHIPPED | OUTPATIENT
Start: 2022-02-25 | End: 2022-03-14

## 2022-02-25 RX ORDER — INSULIN LISPRO 100 [IU]/ML
20 INJECTION, SOLUTION INTRAVENOUS; SUBCUTANEOUS
Qty: 15 ML | Refills: 0 | Status: SHIPPED | OUTPATIENT
Start: 2022-02-25 | End: 2022-03-14

## 2022-02-25 RX ORDER — ATORVASTATIN CALCIUM 40 MG/1
TABLET, FILM COATED ORAL
Qty: 90 TABLET | Refills: 0 | Status: SHIPPED | OUTPATIENT
Start: 2022-02-25 | End: 2022-03-14

## 2022-02-25 RX ORDER — TIZANIDINE 2 MG/1
TABLET ORAL
Qty: 90 TABLET | Refills: 0 | Status: SHIPPED | OUTPATIENT
Start: 2022-02-25 | End: 2022-03-21

## 2022-03-01 RX ORDER — ISOSORBIDE MONONITRATE 60 MG/1
TABLET, EXTENDED RELEASE ORAL
Qty: 90 TABLET | Refills: 2 | Status: SHIPPED | OUTPATIENT
Start: 2022-03-01

## 2022-03-03 ENCOUNTER — TELEPHONE (OUTPATIENT)
Dept: CARDIOLOGY CLINIC | Facility: CLINIC | Age: 62
End: 2022-03-03

## 2022-03-03 ENCOUNTER — TELEPHONE (OUTPATIENT)
Dept: NEPHROLOGY | Facility: HOSPITAL | Age: 62
End: 2022-03-03

## 2022-03-03 ENCOUNTER — PATIENT OUTREACH (OUTPATIENT)
Dept: FAMILY MEDICINE CLINIC | Facility: CLINIC | Age: 62
End: 2022-03-03

## 2022-03-03 PROBLEM — N17.9 AKI (ACUTE KIDNEY INJURY) (HCC): Status: ACTIVE | Noted: 2022-03-03

## 2022-03-03 PROBLEM — N18.9 CKD (CHRONIC KIDNEY DISEASE): Status: RESOLVED | Noted: 2021-09-14 | Resolved: 2022-03-03

## 2022-03-03 PROCEDURE — 3066F NEPHROPATHY DOC TX: CPT | Performed by: PHYSICIAN ASSISTANT

## 2022-03-03 NOTE — TELEPHONE ENCOUNTER
----- Message from Hector Greer PA-C sent at 3/3/2022 12:08 PM EST -----  This patient is scheduled for hospital f/u with me tomorrow for ALFRED  Looks like her f/u creat after discharge on 2/24 is significantly elevated  Please ask pt to get repeat BMP today for reevaluation  Order in [address]  Thanks

## 2022-03-03 NOTE — PATIENT INSTRUCTIONS
 Your kidney function remains stable  We will continue routine surveillance as outlined below   Avoid all NSAIDs to include ibuprofen, Motrin, Aleve, Advil, Naproxen, Celebrex, Indomethacin, Toradol   Stay well hydrated   Continue all prescribed medications as directed  diuretics as per your cardiologist   Call if blood pressure consistently more than 140/90 or less than 110/50s  High and low blood pressures may affect your kidney function   Recommend low salt diet (2 gm sodium diet)   Please let us know if you are scheduled for any studies with IV contrast (ex: CT scan, arteriogram or cardiac catheterization)    Repeat blood work in 1 week  Office will call you with abnormal results that require further action   Follow up in 2 months with Dr Cates Marker with repeat labs prior to appointment   Please contact the office with new symptoms or concerns

## 2022-03-03 NOTE — TELEPHONE ENCOUNTER
Called dick and left a voicemail asking to please have a repeat BMP before tomorrows appt  I left the office number to please call back in the case of questions

## 2022-03-03 NOTE — PROGRESS NOTES
I called the patient to remind her of her Neph appointment for tomorrow; she states she needs to use an Mena Kirt for this visit  The patient reports her weight is increasing; yesterday's reading was 287 and today's 289  Last weight check at her 2/22 Cards appointment was 279  I advised her to call the Cards office as their notes state if her weight has been increasing they may increase the torsemide dose  Cards also placed the patient on bisoprolol 5mg  The patient states she has been elevating her legs often  She had no conversational dyspnea during the call  The patient reports her blood sugars are stable with this morning's reading of 151  The patient noted she spoke with her PCP regarding obtaining a referral to [facility]; will send a note as no referral was placed  The patient states she has been hungry and she needs help  I asked her to eat healthy snacks but she states she cannot afford it  She notes she has been eating green beans and cucumbers which I encouraged her to continue  She did state once her daughter turns 25 (3/17) she will be able to apply for SNAP benefits; she agreed to have [facility] assist       The patient requests a reminder call for her 3/8 GI appointment  I informed her this is for her colonoscopy and she should call their office to confirm as she needed a pre-procedure exam for it  I will continue to follow

## 2022-03-03 NOTE — PROGRESS NOTES
Assessment and Plan:  ALFRED on CKD IV:  -suspect related to cardiorenal syndrome  -admission and peak creatinine 3 13   creatinine 2 73 at discharge  -repeat creat 3 47 on 2/24/22  -UA:  Small blood, plus nitrates, trace leukocytes, 2+ protein, 2-4 RBCs  -suprapubic catheter in place for neurogenic bladder  nonoliguric  -avoid nephrotoxins, hypotension, NSAIDs and IV contrast  -will repeat BMP in 1 week and call pt with results  Will then recheck BMP in 1 month  -return to office in 2 months with Dr Kathy Parra with repeat blood and urine studies for f/u of ALFRED and underlying CKD  Chronic kidney disease IV:    -Etiology:  Suspect related to diabetic nephropathy as well as likely hypertensive nephrosclerosis, cardiorenal syndrome and obesity contributing  -Baseline creatinine 2 5-2 9  May need to except higher baseline to maintain euvolemia  -Follows with Dr Kathy Parra    Hypertension/Volume status:  -BP controlled and adequate  -clinically, examines mildly hypervolemic with BLE edema but lungs clear   -Current medications:  Amlodipine 5 mg daily, bisoprolol 5 mg daily, torsemide 20 mg daily  Torsemide increased to 20 mg BID x 2 days starting today by cardiology due to recent weight gain of 10 lbs over 2 weeks since discharge     -continue diuretic dosing per cardiology  -Avoid hypotension or fluctuations in blood pressure    -low sodium (2 gm) diet  Encourage regular exercise and weight management  -recommend monitoring BP at home daily and daily weights    Nephrotic range proteinuria:  -UA with 2+ protein during recent hospitalization  Hx chronic proteinuria  -UPC ratio 5 74 on 8/3/2021  -reluctant to start ACE inhibitor/ARB previously and now due to recent ALFRED  -continue to monitor    Repeat UPC ratio prior to next appt    Electrolytes/Acid base:  -stable and within normal limits  -continue to monitor    Anemia of CKD:  -Hgb 8 8 on 2/15/2022 and below goal   Goal >10  -Iron studies 2/15/2022:  Iron 54, ferritin 51, iron saturation 23%, TIBC 234  Folate >20  -current meds:  Iron started during recent hospitalization   -will continue to monitor  Repeat CBC prior to next appointment  -Transfuse for Hgb <7 0    Bone Mineral Disease of CKD:  -phosphorus 5 8,  6, Vitamin D 25 23 6 on 8/24/21  -will repeat phosphorus, PTH prior to next appt  Consider Vit D supplement   -continue to monitor    DM II:  -uncontrolled  -HgbA1C 8 5 on 12/16/21  -continue to optimize blood sugar control to slow progression of chronic kidney disease  Continue to closely monitor blood sugars  -management per PCP    Chronic combined diastolic/systolic congestive heart failure:  -EF 45% with grade 2 diastolic dysfunction  -clinically, examines mildly hypervolemic but not overloaded  -continue torsemide, beta blockers  Torsemide increased to BID x 2 days starting today by Cardiology due to 10 lb weight gain  -daily weights, low-sodium diet, fluid restriction  -may need to access higher baseline creatinine to maintain euvolemia  -management and follow-up per Cardiology  Call if weight gain >2 lbs in 1 day or 3 lbs in 2-3 days  Mylene Padron was seen today for follow-up and chronic kidney disease  Diagnoses and all orders for this visit:    ALFRED (acute kidney injury) ([facility] )  -     Comprehensive metabolic panel; Future  -     Urinalysis with microscopic; Future    Stage 4 chronic kidney disease (HCC)  -     CBC; Future  -     Comprehensive metabolic panel; Future  -     Protein / creatinine ratio, urine; Future  -     Phosphorus; Future  -     Urinalysis with microscopic; Future  -     Magnesium; Future    Neurogenic bladder    Morbid obesity with BMI of 45 0-49 9, adult (HCC)    Other proteinuria  -     Protein / creatinine ratio, urine; Future  -     Urinalysis with microscopic;  Future    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 heart failure (HCC)    Primary hypertension    Type 2 diabetes mellitus with stage 4 chronic kidney disease, with long-term current use of insulin (HCC)    Anemia due to stage 4 chronic kidney disease (HCC)  -     CBC; Future        Follow up with Dr Chelsea Phillip in 2 months with repeat blood and urine studies  Repeat BMP in 1 week  Please call the office with any questions or concerns  Reason for Visit: Follow-up (ALFRED) and Chronic Kidney Disease    HPI: Chu Avelar is a 63 y o  female with CKD  IV w/baseline creat 2 5-2 9 w/nephrotic range proteinuria, chronic anemia, uncontrolled DM II, HTN, HLD, GERD , hypothyroidism, CAD, s/p PCI/stent, chronic combined diastolic/systolic congestive heart failure, OA, depression, morbid obesity and neurogenic bladder with suprapubic catheter who presents for hospital follow up of ALFRED on CKD IV  Patient followed by Dr Chelsea Phillip and last seen in office 12/15/21  Baseline creatinine has been in the mid 2s with fluctuations related to various acute illnesses  Patient was recently admitted to [facility] with decompensated CHF with ALFRED  Creatinine 3 13 on admission which also represent peak  Creatinine 2 73 on discharge with suspicion that may need to accept a higher baseline to maintain euvolemia  Repeat BMP 2/24/2022 reviewed with creatinine 3 47/GFR 13  Patient denies nausea, vomiting, abdominal pain, chest pain, dyspnea, orthopnea, PND, poor appetite, hematuria, dysuria or foamy urine  She denies increase in LE edema  She reports 12 lb weight gain since hospital discharge but denies new dyspnea  Torsemide increased by cardiology today for next 2 days  ROS: A complete review of systems was performed and was negative unless otherwise noted in the history of present illness      Allergies:   Codeine and Latex    Medications:     Current Outpatient Medications:     allopurinol (ZYLOPRIM) 300 mg tablet, TAKE 1 TABLET BY MOUTH EVERY DAY, Disp: 90 tablet, Rfl: 1    amLODIPine (NORVASC) 5 mg tablet, TAKE 1 TABLET BY MOUTH EVERY DAY, Disp: 90 tablet, Rfl: 1    aspirin (ECOTRIN LOW STRENGTH) 81 mg EC tablet, TAKE 1 TABLET (81 MG TOTAL) BY MOUTH ONCE DAILY, Disp: 90 tablet, Rfl: 0    atorvastatin (LIPITOR) 40 mg tablet, TAKE 1 TABLET BY MOUTH EVERY DAY, Disp: 90 tablet, Rfl: 0    bisoprolol (ZEBETA) 5 mg tablet, Take 1 tablet (5 mg total) by mouth daily, Disp: 90 tablet, Rfl: 3    citalopram (CeleXA) 40 mg tablet, TAKE 1 TABLET BY MOUTH EVERY DAY, Disp: 90 tablet, Rfl: 0    clopidogrel (PLAVIX) 75 mg tablet, TAKE 1 TABLET BY MOUTH EVERY DAY, Disp: 90 tablet, Rfl: 1    collagenase (SANTYL) ointment, Apply topically daily, Disp: 15 g, Rfl: 0    Continuous Blood Gluc  (FreeStyle [place] 2 [place]) YOUNG, Use as directed, Disp: 1 each, Rfl: 0    Continuous Blood Gluc Sensor (FreeStyle [place] 14 Day Sensor) MISC, USE 1 SENSOR EVERY 2 WEEKS, Disp: 2 each, Rfl: 3    Diclofenac Sodium (VOLTAREN) 1 %, Apply 2 g topically 4 (four) times a day (Patient taking differently: Apply 2 g topically as needed ), Disp: 100 g, Rfl: 1    diphenhydrAMINE (BENADRYL) 25 mg capsule, Take 25 mg by mouth every 4 (four) hours as needed for allergies or sleep , Disp: , Rfl:     docusate sodium (COLACE) 100 mg capsule, Take 1 capsule (100 mg total) by mouth 2 (two) times a day, Disp: 10 capsule, Rfl: 0    ferrous sulfate 325 (65 Fe) mg tablet, TAKE 1 TABLET BY MOUTH EVERY OTHER DAY, Disp: 45 tablet, Rfl: 1    gabapentin (NEURONTIN) 600 MG tablet, TAKE 1 TABLET BY MOUTH 4 TIMES A DAY , Disp: 120 tablet, Rfl: 2    HYDROcodone-acetaminophen (NORCO) 5-325 mg per tablet, Take 1 tablet by mouth 3 (three) times a day as needed for pain For ongoing pain Max Daily Amount: 3 tablets, Disp: 90 tablet, Rfl: 0    insulin glargine (Lantus SoloStar) 100 units/mL injection pen, Inject 50 Units under the skin every 12 (twelve) hours, Disp: 30 mL, Rfl: 2    insulin lispro (HumaLOG) 100 units/mL injection pen, Inject 20 Units under the skin 3 (three) times a day with meals (for large meals - high carb, use 18 units), Disp: 15 mL, Rfl: 0    Insulin Pen Needle (BD Pen Needle Micro U/F) 32G X 6 MM MISC, Use 3 (three) times a day, Disp: 100 each, Rfl: 5    Insulin Syringe-Needle U-100 (BD Veo Insulin Syringe U/F) 31G X 15/64" 0 5 ML MISC, Inject under the skin 3 (three) times a day, Disp: 100 each, Rfl: 2    isosorbide mononitrate (IMDUR) 60 mg 24 hr tablet, TAKE 1 TABLET BY MOUTH EVERY DAY, Disp: 90 tablet, Rfl: 2    Lancets (OneTouch Delica Plus CKLGMM46M) MISC, USE TO TEST 3 TIMES DAILY, Disp: 100 each, Rfl: 2    levothyroxine 50 mcg tablet, TAKE 1 TABLET BY MOUTH EVERY DAY, Disp: 60 tablet, Rfl: 0    naloxone (NARCAN) 4 mg/0 1 mL nasal spray, Administer 1 spray into a nostril  If breathing does not return to normal or if breathing difficulty resumes after 2-3 minutes, give another dose in the other nostril using a new spray , Disp: 1 each, Rfl: 1    nitroglycerin (NITROSTAT) 0 4 mg SL tablet, Place 1 tablet (0 4 mg total) under the tongue every 5 (five) minutes as needed for chest pain, Disp: 25 tablet, Rfl: 1    nystatin (MYCOSTATIN) powder, Apply topically 2 (two) times a day, Disp: 30 g, Rfl: 1    OLANZapine (ZyPREXA) 5 mg tablet, TAKE 1 TABLET BY MOUTH EVERYDAY AT BEDTIME, Disp: 90 tablet, Rfl: 0    ondansetron (ZOFRAN) 4 mg tablet, Take 1 tablet (4 mg total) by mouth every 8 (eight) hours as needed for nausea or vomiting, Disp: 20 tablet, Rfl: 0    OneTouch Ultra test strip, USE 1 EACH DAILY USE AS INSTRUCTED, Disp: 100 strip, Rfl: 2    pantoprazole (PROTONIX) 40 mg tablet, TAKE 1 TABLET BY MOUTH TWICE A DAY, Disp: 180 tablet, Rfl: 1    polyethylene glycol (GLYCOLAX) 17 GM/SCOOP powder, Take 17 g by mouth daily, Disp: 255 g, Rfl: 1    silver sulfadiazine (SILVADENE,SSD) 1 % cream, Apply topically daily To burns on fingers, cover w/band-aid   (Patient taking differently: Apply topically as needed To burns on fingers, cover w/band-aid ), Disp: 50 g, Rfl: 0    tiZANidine (ZANAFLEX) 2 mg tablet, TAKE 1 TABLET BY MOUTH EVERY 8 HOURS AS NEEDED FOR MUSCLE SPASM, Disp: 90 tablet, Rfl: 0    torsemide (DEMADEX) 20 mg tablet, Take 2 tablets (40 mg total) by mouth daily, Disp: 60 tablet, Rfl: 0    Trulicity 1 5 NH/9 7YB SOPN, INJECT 0 5 ML (1 5 MG TOTAL) UNDER THE SKIN ONCE A WEEK, Disp: 6 mL, Rfl: 1    sucralfate (CARAFATE) 1 g/10 mL suspension, Take 10 mL (1 g total) by mouth 4 (four) times a day for 7 days (Patient not taking: Reported on 12/15/2021 ), Disp: 280 mL, Rfl: 0    Past Medical History:   Diagnosis Date    Abnormal liver function     Anemia     Anxiety     Arthritis     Chronic kidney disease     stage 3    Chronic narcotic dependence (HCC)     Chronic pain disorder     lower back, hands , neck and knees    Coronary artery disease     Depression     Diabetes mellitus [facility]     Elevated troponin 2/11/2022    GERD (gastroesophageal reflux disease)     no meds at present    Heart murmur     murmur    Hyperlipidemia     Hypertension     Neurogenic bladder     Open toe wound 12/2020    right big toe open calus but is dry at present    Renal disorder     Shortness of breath     exertion    Sleep apnea     doesn't use cpap    Suprapubic catheter [facility])      Past Surgical History:   Procedure Laterality Date    AMPUTATION      ANGIOPLASTY  2017    5    BREAST EXCISIONAL BIOPSY Left     BREAST SURGERY      CARDIAC CATHETERIZATION      CARPAL TUNNEL RELEASE Bilateral     CERVICAL FUSION      HYSTERECTOMY  2008    IR SUPRAPUBIC CATHETER PLACEMENT  6/15/2021    KNEE SURGERY      OOPHORECTOMY  2008    AZ EXC SKIN BENIG 3 1-4 CM REMAINDR BODY N/A 12/21/2020    Procedure: EXCISION SEBACEOUS CYST X 5 SCALP;  Surgeon: Evelyn Boss MD;  Location: [address] [place] [address];  Service: General    TOE AMPUTATION Left     TRIGGER FINGER RELEASE Right     4th Finger     Family History   Problem Relation Age of Onset    Stroke Father     Heart disease Father     No Known Problems Mother     No Known Problems Sister  No Known Problems Daughter     No Known Problems Maternal Grandmother     No Known Problems Maternal Grandfather     No Known Problems Paternal Grandmother     No Known Problems Paternal Grandfather     No Known Problems Maternal Aunt     No Known Problems Maternal Aunt     No Known Problems Maternal Aunt     No Known Problems Maternal Aunt     No Known Problems Maternal Aunt     No Known Problems Maternal Aunt     No Known Problems Paternal Aunt       reports that she quit smoking about 10 years ago  Her smoking use included cigarettes  She has a 35 00 pack-year smoking history  She has never used smokeless tobacco  She reports previous alcohol use  She reports that she does not use drugs  Physical Exam:   Vitals:    03/04/22 1532 03/04/22 1558   BP: 160/94 138/78   BP Location: Left arm Right arm   Patient Position: Sitting Sitting   Cuff Size: Large Standard   Weight: 132 kg (290 lb)    Height: 5' 8" (1 727 m)      Body mass index is 44 09 kg/m²  General:  Awake, alert, appears comfortable and in no acute distress  Nontoxic  Skin:  No rash, warm, good skin turgor   Eyes:  PERRL, EOMI, sclerae nonicteric   no periorbital edema   ENT:  Moist mucous membranes  Neck:  Trachea midline, symmetric  No JVD  No carotid bruits  Chest:  Clear to auscultation bilaterally without wheezes, crackles or rhonchi  CVS:  Regular rate and rhythm without murmur, gallop or rub  S1 and S2 identified and normal   No S3, S4    Abdomen:  Soft, nontender, nondistended without masses  Normal bowel sounds x 4 quadrants  No bruit  Extremities:  Warm, pink, motor and sensory intact and well perfused  No cyanosis, pallor  1 BLE edema with chronic stasis changes bilateral   + lipodermatosclerosis  Neuro:  Awake, alert, oriented x3  Grossly intact  Psych:  Appropriate affect  Mentating appropriately    Normal mental status exam  :  +suprapubic catheter in place and draining clear yellow urine      Procedure:  No results found for this or any previous visit  Lab Results   Component Value Date    CALCIUM 8 5 02/24/2022    K 4 8 02/24/2022    CO2 28 02/24/2022     02/24/2022    BUN 69 (H) 02/24/2022    CREATININE 3 47 (H) 02/24/2022             Invalid input(s): ALBUMIN    I have personally reviewed the blood work as stated above and in my note  I have personally reviewed PCP, Cardiology, inpatient progress notes from recent admission and prior nephrology notes

## 2022-03-03 NOTE — TELEPHONE ENCOUNTER
Phone call from patient who was to call with any weight gain  Since appt on 2/22/22 she has gained 10 lbs  She went from 279 lbs to 289 lbs  She gained two pounds over night but had pizza last night for supper  In last office visit note, Dr Daya Patten was going to increase torsemide if weight went up  Patient denies SOB, but does have a lot of edema in hands and legs, up to calves  She takes torsemide 40 mgs daily  Please advise

## 2022-03-04 ENCOUNTER — OFFICE VISIT (OUTPATIENT)
Dept: NEPHROLOGY | Facility: CLINIC | Age: 62
End: 2022-03-04
Payer: COMMERCIAL

## 2022-03-04 VITALS
DIASTOLIC BLOOD PRESSURE: 78 MMHG | WEIGHT: 290 LBS | BODY MASS INDEX: 43.95 KG/M2 | SYSTOLIC BLOOD PRESSURE: 138 MMHG | HEIGHT: 68 IN

## 2022-03-04 DIAGNOSIS — N18.4 TYPE 2 DIABETES MELLITUS WITH STAGE 4 CHRONIC KIDNEY DISEASE, WITH LONG-TERM CURRENT USE OF INSULIN (HCC): ICD-10-CM

## 2022-03-04 DIAGNOSIS — N31.9 NEUROGENIC BLADDER: ICD-10-CM

## 2022-03-04 DIAGNOSIS — I50.43 ACUTE ON CHRONIC COMBINED SYSTOLIC AND DIASTOLIC CONGESTIVE HEART FAILURE (HCC): ICD-10-CM

## 2022-03-04 DIAGNOSIS — D63.1 ANEMIA DUE TO STAGE 4 CHRONIC KIDNEY DISEASE (HCC): ICD-10-CM

## 2022-03-04 DIAGNOSIS — I10 PRIMARY HYPERTENSION: ICD-10-CM

## 2022-03-04 DIAGNOSIS — E11.22 TYPE 2 DIABETES MELLITUS WITH STAGE 4 CHRONIC KIDNEY DISEASE, WITH LONG-TERM CURRENT USE OF INSULIN (HCC): ICD-10-CM

## 2022-03-04 DIAGNOSIS — N18.4 ANEMIA DUE TO STAGE 4 CHRONIC KIDNEY DISEASE (HCC): ICD-10-CM

## 2022-03-04 DIAGNOSIS — N17.9 AKI (ACUTE KIDNEY INJURY) (HCC): Primary | ICD-10-CM

## 2022-03-04 DIAGNOSIS — R80.8 OTHER PROTEINURIA: ICD-10-CM

## 2022-03-04 DIAGNOSIS — E66.01 MORBID OBESITY WITH BMI OF 45.0-49.9, ADULT (HCC): ICD-10-CM

## 2022-03-04 DIAGNOSIS — N18.4 STAGE 4 CHRONIC KIDNEY DISEASE (HCC): ICD-10-CM

## 2022-03-04 DIAGNOSIS — Z79.4 TYPE 2 DIABETES MELLITUS WITH STAGE 4 CHRONIC KIDNEY DISEASE, WITH LONG-TERM CURRENT USE OF INSULIN (HCC): ICD-10-CM

## 2022-03-04 PROCEDURE — 1036F TOBACCO NON-USER: CPT | Performed by: PHYSICIAN ASSISTANT

## 2022-03-04 PROCEDURE — 1111F DSCHRG MED/CURRENT MED MERGE: CPT | Performed by: PHYSICIAN ASSISTANT

## 2022-03-04 PROCEDURE — 99214 OFFICE O/P EST MOD 30 MIN: CPT | Performed by: PHYSICIAN ASSISTANT

## 2022-03-04 NOTE — LETTER
March 4, 2022     Patient: Milagros Cooper       Date of Visit: 3/4/2022       To Whom it May Concern:    Milagros Cooper is under my professional care  She was seen in my office on 3/4/2022  She was a companied by Tracy Shepherd today, please excuse him from work 3/4/2022  If you have any questions or concerns, please don't hesitate to call           Sincerely,          Mati Rodgers PA-C

## 2022-03-04 NOTE — TELEPHONE ENCOUNTER
Called her twice and left a message on her answering machine with Dr Doug Goins torsemide directions   I never did get to talk to her personally

## 2022-03-07 ENCOUNTER — TELEPHONE (OUTPATIENT)
Dept: GASTROENTEROLOGY | Facility: CLINIC | Age: 62
End: 2022-03-07

## 2022-03-07 ENCOUNTER — PATIENT OUTREACH (OUTPATIENT)
Dept: FAMILY MEDICINE CLINIC | Facility: CLINIC | Age: 62
End: 2022-03-07

## 2022-03-07 NOTE — TELEPHONE ENCOUNTER
Our mutual patient is scheduled for procedure: EGD/Colonoscopy     On:  5/24/22      With: Dr Aly Burnette  Patient is in need of cardiac clearance for upcoming procedure        Physician Approving clearance: ________________________

## 2022-03-07 NOTE — TELEPHONE ENCOUNTER
Procedure scheduled with Mita Owen      Scheduled date of EGD/colonoscopy (as of today): 5/24/22  Physician performing EGD/colonoscopy: Dr Cordon  Location of EGD/colonoscopy: [address] [place] [address]  Desired bowel prep reviewed with patient: [facility] INPATIENT  Instructions reviewed with patient by: Rhianna/mailed  Clearances: Cardiac Clearance message sent to Dr England

## 2022-03-07 NOTE — TELEPHONE ENCOUNTER
Patients GI provider:  Dr Melina Burger    Number to return call: [phone]    Reason for call: Alf aVnce from [facility] called to r/s pt's procedures   Please call Alf Vance at the above number to r/s procedures    Scheduled procedure/appointment date if applicable: Procedure - 03/08/22

## 2022-03-07 NOTE — PROGRESS NOTES
I received a call from [facility]  The patient's colonoscopy/EGD has been scheduled for 5/24; they will call the day prior with the time  I called the patient to inform her  I also asked her to call Cards regarding her weight increase as well as asking them to change the 4/13 appointment to a procedure clearance and she agreed  I will continue to follow

## 2022-03-07 NOTE — TELEPHONE ENCOUNTER
Our mutual patient is scheduled for procedure: EGD/Colonoscopy    On: 5/24/22  With: Dr Brenda Gonsalez    She is taking the following blood thinner: Plavix    Can this be stopped __5____ days prior to the procedure?       Physician Approving clearance: ________________________

## 2022-03-07 NOTE — PROGRESS NOTES
I received a call from the patient questioning if her GI procedure is still on for tomorrow  Chart was reviewed and I saw a note from Cards last week who increased the patient's diuretic for 2 days  I asked if she lost weight with the increased dose and she reports she lost 4 lbs however today's weight showed a 4 lb increase; patient is back to 289 lbs  The patient denies chest pain and has shortness of breath only with exertion  She notes her edema has improved slightly  I asked the patient to call Cardiology again to report the 4 lb weight gain and ask if it is appropriate to have the GI procedure tomorrow; she will call me back with an update  I asked her to continue watching her sodium intake  The patient also reports 2 nights ago, she had diarrhea in her sleep  She denies feeling ill or eating something that may have caused this  She did note she usually has some sort of GI issue  The patient reports increased "deep belching" as well  I informed the patient having the EGD/colonoscopy would be beneficial and we will wait to her if Cardiology approves this  I received a call from the patient stating Cardiology never received pre-procedure paperwork to clear the patient for her procedures for tomorrow  She also did not stop ASA or Plavix  I called the GI office to reschedule but they will be calling me back  I called the patient to inform her I have not received a call from GI to reschedule her procedures and they call her directly  The patient stated she has not yet heard any further instruction from Cards regarding her weight gain  I reminded the patient she needs a BMP drawn for Neph; she plans on calling the  [facility] to have this drawn  I will continue to follow

## 2022-03-09 ENCOUNTER — TELEPHONE (OUTPATIENT)
Dept: FAMILY MEDICINE CLINIC | Facility: CLINIC | Age: 62
End: 2022-03-09

## 2022-03-09 NOTE — TELEPHONE ENCOUNTER
lvm for pt to return phone call and reschedule applacy Mejia will be unavailable for the afternoon   If pt returns phone call please assist with rescheduling

## 2022-03-11 ENCOUNTER — TELEPHONE (OUTPATIENT)
Dept: CARDIOLOGY CLINIC | Facility: CLINIC | Age: 62
End: 2022-03-11

## 2022-03-11 NOTE — TELEPHONE ENCOUNTER
Pt called states weight is up three labs feels fine    questioned her about what she ate yesterday had chicken nuggets and french fries   told her sure the fries had salt on---she states added no extra though   she will monitor weight cand callif any more weight gained  Stressed to avoid salted foods

## 2022-03-12 DIAGNOSIS — E11.42 TYPE 2 DIABETES MELLITUS WITH DIABETIC POLYNEUROPATHY, WITH LONG-TERM CURRENT USE OF INSULIN (HCC): ICD-10-CM

## 2022-03-12 DIAGNOSIS — I25.10 CORONARY ARTERY DISEASE INVOLVING NATIVE HEART WITHOUT ANGINA PECTORIS, UNSPECIFIED VESSEL OR LESION TYPE: ICD-10-CM

## 2022-03-12 DIAGNOSIS — Z79.4 CURRENT USE OF INSULIN (HCC): ICD-10-CM

## 2022-03-12 DIAGNOSIS — M10.9 GOUT, UNSPECIFIED CAUSE, UNSPECIFIED CHRONICITY, UNSPECIFIED SITE: ICD-10-CM

## 2022-03-12 DIAGNOSIS — N18.32 STAGE 3B CHRONIC KIDNEY DISEASE (HCC): ICD-10-CM

## 2022-03-12 DIAGNOSIS — Z79.4 TYPE 2 DIABETES MELLITUS WITH DIABETIC POLYNEUROPATHY, WITH LONG-TERM CURRENT USE OF INSULIN (HCC): ICD-10-CM

## 2022-03-14 RX ORDER — CITALOPRAM 40 MG/1
TABLET ORAL
Qty: 90 TABLET | Refills: 0 | Status: SHIPPED | OUTPATIENT
Start: 2022-03-14 | End: 2022-03-29 | Stop reason: SDUPTHER

## 2022-03-14 RX ORDER — ATORVASTATIN CALCIUM 40 MG/1
TABLET, FILM COATED ORAL
Qty: 90 TABLET | Refills: 0 | Status: SHIPPED | OUTPATIENT
Start: 2022-03-14 | End: 2022-03-29 | Stop reason: SDUPTHER

## 2022-03-14 RX ORDER — ALLOPURINOL 300 MG/1
TABLET ORAL
Qty: 90 TABLET | Refills: 1 | Status: SHIPPED | OUTPATIENT
Start: 2022-03-14 | End: 2022-03-29 | Stop reason: SDUPTHER

## 2022-03-14 RX ORDER — INSULIN LISPRO 100 [IU]/ML
INJECTION, SOLUTION INTRAVENOUS; SUBCUTANEOUS
Qty: 15 ML | Refills: 2 | Status: SHIPPED | OUTPATIENT
Start: 2022-03-14 | End: 2022-03-29 | Stop reason: SDUPTHER

## 2022-03-16 ENCOUNTER — TELEPHONE (OUTPATIENT)
Dept: GASTROENTEROLOGY | Facility: CLINIC | Age: 62
End: 2022-03-16

## 2022-03-16 NOTE — TELEPHONE ENCOUNTER
Our mutual patient is scheduled for procedure: EGD/Colonoscopy  On: 5/24/22      With: Dr Alexandro Montes  Patient is in need of cardiac clearance for upcoming procedure        Physician Approving clearance: ________________________

## 2022-03-17 ENCOUNTER — PATIENT OUTREACH (OUTPATIENT)
Dept: FAMILY MEDICINE CLINIC | Facility: CLINIC | Age: 62
End: 2022-03-17

## 2022-03-17 DIAGNOSIS — Z78.9 NEED FOR COMMUNITY RESOURCE: Primary | ICD-10-CM

## 2022-03-17 NOTE — PROGRESS NOTES
Referral was placed for [facility] as patient is requesting to apply for SNAP benefits since her daughter turned 24 today

## 2022-03-18 ENCOUNTER — PATIENT OUTREACH (OUTPATIENT)
Dept: FAMILY MEDICINE CLINIC | Facility: CLINIC | Age: 62
End: 2022-03-18

## 2022-03-18 NOTE — PROGRESS NOTES
Outgoing Call:  3/18/2022    [facility] did call pt to discuss referral received for interest in applying for SNAP benefits  Pt agreed to services and [facility] assessment was completed  [facility] did complete a SNAP application on [employer]  Pt is aware she needs to provide most recent bank statement and copy of ID  [facility] will get copy of ID from her chart  Pt to call her bank and request statement be faxed to [facility]  Next outreach is scheduled for 3/21/2022

## 2022-03-20 DIAGNOSIS — Z98.1 S/P CERVICAL SPINAL FUSION: ICD-10-CM

## 2022-03-21 ENCOUNTER — PATIENT OUTREACH (OUTPATIENT)
Dept: FAMILY MEDICINE CLINIC | Facility: CLINIC | Age: 62
End: 2022-03-21

## 2022-03-21 RX ORDER — TIZANIDINE 2 MG/1
TABLET ORAL
Qty: 90 TABLET | Refills: 0 | Status: SHIPPED | OUTPATIENT
Start: 2022-03-21 | End: 2022-03-29

## 2022-03-21 NOTE — PROGRESS NOTES
Chart Review / Outgoing Call:  3/21/2022    [facility] did complete a chart review and submitted pt's ID to [facility] via [place] [employer] website  [facility] did call pt and asked her if she called her bank to request recent bank statement  Pt stated that she had not  [facility] informed her she will be denied SNAP benefits if she does not provide this  Pt agreed to call her bank today and have them forward recent copy to [facility] via fax  Next outreach is scheduled for 3/23/2022  Incoming Text / Scan:    [facility] did receive 4 text messages from pt's phone with copies of her bank statements  [facility] did scan to [employer] for review  This was needed to complete SNAP application

## 2022-03-22 ENCOUNTER — TELEPHONE (OUTPATIENT)
Dept: FAMILY MEDICINE CLINIC | Facility: CLINIC | Age: 62
End: 2022-03-22

## 2022-03-22 ENCOUNTER — PATIENT OUTREACH (OUTPATIENT)
Dept: FAMILY MEDICINE CLINIC | Facility: CLINIC | Age: 62
End: 2022-03-22

## 2022-03-22 NOTE — TELEPHONE ENCOUNTER
[employer] LETTER form received BY MAIL to be completed by PCP  Copy made and placed in PCP folder  Forms to be delivered to PCP mailbox at assigned time

## 2022-03-22 NOTE — PROGRESS NOTES
Outgoing Calls:  3/22/2022    [facility] did check on status of pt's SNAP application  SNAP benefits have been approved in the amount of $214 a month and an extra COVID allotment of $95 a month  [facility] ordered an EBT crad for pt  [facility] was informed that two cards were accidently sent to pt  She will need to dispose of card that ends in 288 and activate card that ends in 296  [facility] did call pt and informed her of this  Pt was very appreciative of [facility]'s assistance  This referral will be closed today as needs have been met  No further outreach is scheduled

## 2022-03-28 ENCOUNTER — PATIENT OUTREACH (OUTPATIENT)
Dept: FAMILY MEDICINE CLINIC | Facility: CLINIC | Age: 62
End: 2022-03-28

## 2022-03-28 PROCEDURE — 4010F ACE/ARB THERAPY RXD/TAKEN: CPT | Performed by: PHYSICIAN ASSISTANT

## 2022-03-28 NOTE — PROGRESS NOTES
I called the patient to remind her of 2 appointments for tomorrow: GI at 130 and PCP at 245  Patient was provided with the address for the GI appointment  She was thankful for the call as she was unaware of the appointments  I will continue to follow

## 2022-03-29 ENCOUNTER — OFFICE VISIT (OUTPATIENT)
Dept: GASTROENTEROLOGY | Facility: MEDICAL CENTER | Age: 62
End: 2022-03-29
Payer: COMMERCIAL

## 2022-03-29 ENCOUNTER — OFFICE VISIT (OUTPATIENT)
Dept: FAMILY MEDICINE CLINIC | Facility: CLINIC | Age: 62
End: 2022-03-29

## 2022-03-29 VITALS — HEART RATE: 59 BPM | SYSTOLIC BLOOD PRESSURE: 139 MMHG | DIASTOLIC BLOOD PRESSURE: 72 MMHG | TEMPERATURE: 95.8 F

## 2022-03-29 VITALS
HEART RATE: 75 BPM | HEIGHT: 68 IN | TEMPERATURE: 97.5 F | SYSTOLIC BLOOD PRESSURE: 124 MMHG | BODY MASS INDEX: 42.69 KG/M2 | WEIGHT: 281.7 LBS | RESPIRATION RATE: 18 BRPM | DIASTOLIC BLOOD PRESSURE: 84 MMHG | OXYGEN SATURATION: 98 %

## 2022-03-29 DIAGNOSIS — R60.0 EDEMA OF LOWER EXTREMITY: ICD-10-CM

## 2022-03-29 DIAGNOSIS — K59.00 CONSTIPATION, UNSPECIFIED CONSTIPATION TYPE: ICD-10-CM

## 2022-03-29 DIAGNOSIS — F41.9 INSOMNIA SECONDARY TO ANXIETY: ICD-10-CM

## 2022-03-29 DIAGNOSIS — R10.11 RIGHT UPPER QUADRANT ABDOMINAL PAIN: Primary | ICD-10-CM

## 2022-03-29 DIAGNOSIS — G89.4 CHRONIC PAIN SYNDROME: ICD-10-CM

## 2022-03-29 DIAGNOSIS — F17.210 SMOKING GREATER THAN 20 PACK YEARS: ICD-10-CM

## 2022-03-29 DIAGNOSIS — Z12.2 ENCOUNTER FOR SCREENING FOR LUNG CANCER: ICD-10-CM

## 2022-03-29 DIAGNOSIS — E66.01 MORBID OBESITY WITH BMI OF 45.0-49.9, ADULT (HCC): ICD-10-CM

## 2022-03-29 DIAGNOSIS — M17.12 PRIMARY OSTEOARTHRITIS OF LEFT KNEE: ICD-10-CM

## 2022-03-29 DIAGNOSIS — G89.29 CHRONIC RLQ PAIN: ICD-10-CM

## 2022-03-29 DIAGNOSIS — L97.509 TYPE 2 DIABETES MELLITUS WITH FOOT ULCER, WITH LONG-TERM CURRENT USE OF INSULIN (HCC): ICD-10-CM

## 2022-03-29 DIAGNOSIS — M10.9 GOUT, UNSPECIFIED CAUSE, UNSPECIFIED CHRONICITY, UNSPECIFIED SITE: ICD-10-CM

## 2022-03-29 DIAGNOSIS — E11.621 TYPE 2 DIABETES MELLITUS WITH FOOT ULCER, WITH LONG-TERM CURRENT USE OF INSULIN (HCC): ICD-10-CM

## 2022-03-29 DIAGNOSIS — Z79.4 TYPE 2 DIABETES MELLITUS WITH FOOT ULCER, WITH LONG-TERM CURRENT USE OF INSULIN (HCC): ICD-10-CM

## 2022-03-29 DIAGNOSIS — I10 ESSENTIAL HYPERTENSION: ICD-10-CM

## 2022-03-29 DIAGNOSIS — F51.05 INSOMNIA SECONDARY TO ANXIETY: ICD-10-CM

## 2022-03-29 DIAGNOSIS — N18.32 STAGE 3B CHRONIC KIDNEY DISEASE (HCC): ICD-10-CM

## 2022-03-29 DIAGNOSIS — Z79.4 TYPE 2 DIABETES MELLITUS WITH DIABETIC POLYNEUROPATHY, WITH LONG-TERM CURRENT USE OF INSULIN (HCC): Primary | ICD-10-CM

## 2022-03-29 DIAGNOSIS — Z12.31 ENCOUNTER FOR SCREENING MAMMOGRAM FOR MALIGNANT NEOPLASM OF BREAST: ICD-10-CM

## 2022-03-29 DIAGNOSIS — E11.42 TYPE 2 DIABETES MELLITUS WITH DIABETIC POLYNEUROPATHY, WITH LONG-TERM CURRENT USE OF INSULIN (HCC): Primary | ICD-10-CM

## 2022-03-29 DIAGNOSIS — Z98.1 S/P CERVICAL SPINAL FUSION: ICD-10-CM

## 2022-03-29 DIAGNOSIS — I25.10 CORONARY ARTERY DISEASE INVOLVING NATIVE HEART WITHOUT ANGINA PECTORIS, UNSPECIFIED VESSEL OR LESION TYPE: ICD-10-CM

## 2022-03-29 DIAGNOSIS — Z79.4 CURRENT USE OF INSULIN (HCC): ICD-10-CM

## 2022-03-29 DIAGNOSIS — D64.9 NORMOCHROMIC NORMOCYTIC ANEMIA: ICD-10-CM

## 2022-03-29 DIAGNOSIS — D64.9 ANEMIA, UNSPECIFIED TYPE: ICD-10-CM

## 2022-03-29 DIAGNOSIS — K21.9 GASTROESOPHAGEAL REFLUX DISEASE, UNSPECIFIED WHETHER ESOPHAGITIS PRESENT: ICD-10-CM

## 2022-03-29 DIAGNOSIS — R10.31 CHRONIC RLQ PAIN: ICD-10-CM

## 2022-03-29 LAB — SL AMB POCT HEMOGLOBIN AIC: 7.3 (ref ?–6.5)

## 2022-03-29 PROCEDURE — 3051F HG A1C>EQUAL 7.0%<8.0%: CPT | Performed by: PHYSICIAN ASSISTANT

## 2022-03-29 PROCEDURE — 3008F BODY MASS INDEX DOCD: CPT | Performed by: PHYSICIAN ASSISTANT

## 2022-03-29 PROCEDURE — G0439 PPPS, SUBSEQ VISIT: HCPCS | Performed by: NURSE PRACTITIONER

## 2022-03-29 PROCEDURE — 99214 OFFICE O/P EST MOD 30 MIN: CPT | Performed by: PHYSICIAN ASSISTANT

## 2022-03-29 PROCEDURE — 83036 HEMOGLOBIN GLYCOSYLATED A1C: CPT | Performed by: NURSE PRACTITIONER

## 2022-03-29 RX ORDER — INSULIN LISPRO 100 [IU]/ML
20 INJECTION, SOLUTION INTRAVENOUS; SUBCUTANEOUS
Qty: 20 ML | Refills: 2 | Status: SHIPPED | OUTPATIENT
Start: 2022-03-29 | End: 2022-07-06

## 2022-03-29 RX ORDER — CLOPIDOGREL BISULFATE 75 MG/1
75 TABLET ORAL DAILY
Qty: 90 TABLET | Refills: 1 | Status: SHIPPED | OUTPATIENT
Start: 2022-03-29

## 2022-03-29 RX ORDER — ATORVASTATIN CALCIUM 40 MG/1
40 TABLET, FILM COATED ORAL DAILY
Qty: 90 TABLET | Refills: 1 | Status: SHIPPED | OUTPATIENT
Start: 2022-03-29

## 2022-03-29 RX ORDER — CITALOPRAM 40 MG/1
40 TABLET ORAL DAILY
Qty: 90 TABLET | Refills: 1 | Status: SHIPPED | OUTPATIENT
Start: 2022-03-29

## 2022-03-29 RX ORDER — FERROUS SULFATE 325(65) MG
1 TABLET ORAL EVERY OTHER DAY
Qty: 45 TABLET | Refills: 1 | Status: SHIPPED | OUTPATIENT
Start: 2022-03-29 | End: 2022-07-28

## 2022-03-29 RX ORDER — HYDROCODONE BITARTRATE AND ACETAMINOPHEN 5; 325 MG/1; MG/1
1 TABLET ORAL 3 TIMES DAILY PRN
Qty: 90 TABLET | Refills: 0 | Status: SHIPPED | OUTPATIENT
Start: 2022-03-29 | End: 2022-05-02 | Stop reason: SDUPTHER

## 2022-03-29 RX ORDER — AMLODIPINE BESYLATE 5 MG/1
5 TABLET ORAL DAILY
Qty: 90 TABLET | Refills: 1 | Status: SHIPPED | OUTPATIENT
Start: 2022-03-29 | End: 2022-07-02

## 2022-03-29 RX ORDER — DULAGLUTIDE 1.5 MG/.5ML
0.5 INJECTION, SOLUTION SUBCUTANEOUS WEEKLY
Qty: 6 ML | Refills: 1 | Status: SHIPPED | OUTPATIENT
Start: 2022-03-29

## 2022-03-29 RX ORDER — DOCUSATE SODIUM 100 MG/1
100 CAPSULE, LIQUID FILLED ORAL 2 TIMES DAILY
Qty: 10 CAPSULE | Refills: 0 | Status: SHIPPED | OUTPATIENT
Start: 2022-03-29 | End: 2022-07-02

## 2022-03-29 RX ORDER — ALLOPURINOL 300 MG/1
300 TABLET ORAL DAILY
Qty: 90 TABLET | Refills: 1 | Status: SHIPPED | OUTPATIENT
Start: 2022-03-29

## 2022-03-29 RX ORDER — PEN NEEDLE, DIABETIC 32 GX 1/4"
NEEDLE, DISPOSABLE MISCELLANEOUS
Qty: 200 EACH | Refills: 5 | Status: SHIPPED | OUTPATIENT
Start: 2022-03-29

## 2022-03-29 RX ORDER — GABAPENTIN 800 MG/1
800 TABLET ORAL 4 TIMES DAILY
Qty: 120 TABLET | Refills: 2 | Status: ON HOLD | OUTPATIENT
Start: 2022-03-29 | End: 2022-07-19 | Stop reason: SDUPTHER

## 2022-03-29 RX ORDER — ASPIRIN 81 MG/1
81 TABLET ORAL DAILY
Qty: 90 TABLET | Refills: 1 | Status: SHIPPED | OUTPATIENT
Start: 2022-03-29

## 2022-03-29 RX ORDER — OLANZAPINE 5 MG/1
5 TABLET ORAL
Qty: 90 TABLET | Refills: 0 | Status: SHIPPED | OUTPATIENT
Start: 2022-03-29 | End: 2022-07-21 | Stop reason: SDUPTHER

## 2022-03-29 RX ORDER — SYRING-NEEDL,DISP,INSUL,0.3 ML 31GX15/64"
SYRINGE, EMPTY DISPOSABLE MISCELLANEOUS 3 TIMES DAILY
Qty: 100 EACH | Refills: 2 | Status: SHIPPED | OUTPATIENT
Start: 2022-03-29

## 2022-03-29 RX ORDER — INSULIN GLARGINE 100 [IU]/ML
50 INJECTION, SOLUTION SUBCUTANEOUS EVERY 12 HOURS SCHEDULED
Qty: 30 ML | Refills: 2 | Status: SHIPPED | OUTPATIENT
Start: 2022-03-29 | End: 2022-05-02 | Stop reason: SDUPTHER

## 2022-03-29 RX ORDER — TIZANIDINE 4 MG/1
4 TABLET ORAL EVERY 8 HOURS PRN
Qty: 90 TABLET | Refills: 0 | Status: SHIPPED | OUTPATIENT
Start: 2022-03-29 | End: 2022-04-25

## 2022-03-29 RX ORDER — POLYETHYLENE GLYCOL 3350 17 G/17G
17 POWDER, FOR SOLUTION ORAL DAILY
Qty: 255 G | Refills: 1 | Status: SHIPPED | OUTPATIENT
Start: 2022-03-29 | End: 2022-07-02

## 2022-03-29 NOTE — PROGRESS NOTES
Assessment and Plan:     Problem List Items Addressed This Visit        Cardiovascular and Mediastinum    CAD (coronary artery disease)    Relevant Medications    amLODIPine (NORVASC) 5 mg tablet    aspirin (ECOTRIN LOW STRENGTH) 81 mg EC tablet    atorvastatin (LIPITOR) 40 mg tablet    citalopram (CeleXA) 40 mg tablet    clopidogrel (PLAVIX) 75 mg tablet       Musculoskeletal and Integument    Osteoarthritis of left knee    Relevant Medications    HYDROcodone-acetaminophen (NORCO) 5-325 mg per tablet       Other    S/P cervical spinal fusion    Relevant Medications    HYDROcodone-acetaminophen (NORCO) 5-325 mg per tablet    tiZANidine (ZANAFLEX) 4 mg tablet    gabapentin (NEURONTIN) 800 mg tablet    Morbid obesity with BMI of 45 0-49 9, adult ([facility])    Relevant Orders    Ambulatory Referral to Weight Management    Vitamin D 25 hydroxy    Vitamin B12    PTH, intact      Other Visit Diagnoses     Type 2 diabetes mellitus with diabetic polyneuropathy, with long-term current use of insulin ([facility])    -  Primary    Relevant Medications    Dulaglutide (Trulicity) 1 5 JT/1 3PC SOPN    Insulin Syringe-Needle U-100 (BD Veo Insulin Syringe U/F) 31G X 15/64" 0 5 ML MISC    insulin glargine (Lantus SoloStar) 100 units/mL injection pen    insulin lispro (HumaLOG) 100 units/mL injection pen    aspirin (ECOTRIN LOW STRENGTH) 81 mg EC tablet    atorvastatin (LIPITOR) 40 mg tablet    citalopram (CeleXA) 40 mg tablet    clopidogrel (PLAVIX) 75 mg tablet    Other Relevant Orders    POCT hemoglobin A1c (Completed)    Lipid panel    Constipation, unspecified constipation type        Relevant Medications    polyethylene glycol (GLYCOLAX) 17 GM/SCOOP powder    docusate sodium (COLACE) 100 mg capsule    Insomnia secondary to anxiety        Relevant Medications    OLANZapine (ZyPREXA) 5 mg tablet    citalopram (CeleXA) 40 mg tablet    Type 2 diabetes mellitus with foot ulcer, with long-term current use of insulin ([facility])        Relevant Medications    Dulaglutide (Trulicity) 1 5 OL/2 9ZW SOPN    Insulin Pen Needle (BD Pen Needle Micro U/F) 32G X 6 MM MISC    insulin glargine (Lantus SoloStar) 100 units/mL injection pen    insulin lispro (HumaLOG) 100 units/mL injection pen    Normochromic normocytic anemia        Relevant Medications    ferrous sulfate 325 (65 Fe) mg tablet    Gout, unspecified cause, unspecified chronicity, unspecified site        Relevant Medications    allopurinol (ZYLOPRIM) 300 mg tablet    Essential hypertension        Relevant Medications    amLODIPine (NORVASC) 5 mg tablet    Stage 3b chronic kidney disease (HCC)        Relevant Medications    aspirin (ECOTRIN LOW STRENGTH) 81 mg EC tablet    atorvastatin (LIPITOR) 40 mg tablet    citalopram (CeleXA) 40 mg tablet    clopidogrel (PLAVIX) 75 mg tablet    Other Relevant Orders    Vitamin D 25 hydroxy    Vitamin B12    PTH, intact    Current use of insulin (HCC)        Relevant Medications    aspirin (ECOTRIN LOW STRENGTH) 81 mg EC tablet    atorvastatin (LIPITOR) 40 mg tablet    citalopram (CeleXA) 40 mg tablet    clopidogrel (PLAVIX) 75 mg tablet    Edema of lower extremity        Relevant Orders    Compression Stocking    Chronic pain syndrome        Relevant Orders    Ambulatory Referral to Pain Management    Encounter for screening mammogram for malignant neoplasm of breast        Relevant Orders    Mammo screening bilateral w 3d & cad    Smoking greater than 20 pack years        Relevant Orders    CT lung screening program    Encounter for screening for lung cancer        Relevant Orders    CT lung screening program        BMI Counseling: Body mass index is 42 83 kg/m²  The BMI is above normal  Nutrition recommendations include decreasing portion sizes, encouraging healthy choices of fruits and vegetables, decreasing fast food intake, consuming healthier snacks and limiting drinks that contain sugar  Exercise recommendations include exercising 3-5 times per week   Rationale for BMI follow-up plan is due to patient being overweight or obese  Preventive health issues were discussed with patient, and age appropriate screening tests were ordered as noted in patient's After Visit Summary  Personalized health advice and appropriate referrals for health education or preventive services given if needed, as noted in patient's After Visit Summary  History of Present Illness:     Patient presents for Welcome to Medicare visit  Patient Care Team:  Cristi Gama as PCP - General (Family Medicine)  Donald Jacinto MD as PCP - [address] [place] [address] (RTE)  Mahendra Reyes RN as Lead OP Care Mgr     Review of Systems:     Review of Systems   Constitutional: Negative for chills and fever  HENT: Negative for ear pain and sore throat  Eyes: Negative for pain and visual disturbance  Respiratory: Negative for cough and shortness of breath  Cardiovascular: Negative for chest pain and palpitations  Gastrointestinal: Negative for abdominal pain and vomiting  Genitourinary: Negative for dysuria and hematuria  Musculoskeletal: Positive for arthralgias, back pain and myalgias  Skin: Negative for color change and rash  Neurological: Negative for seizures and syncope  All other systems reviewed and are negative       Problem List:     Patient Active Problem List   Diagnosis    Cellulitis of finger of right hand    Major depressive disorder    Type 2 diabetes mellitus with stage 4 chronic kidney disease, with long-term current use of insulin (HCC)    Anxiety    CAD (coronary artery disease)    Osteoarthritis of left knee    Anemia    Abnormal LFTs    S/P cervical spinal fusion    Head lump    Diabetic ulcer of toe of right foot associated with type 2 diabetes mellitus, with muscle involvement without evidence of necrosis (HCC)    HTN (hypertension)    Skin ulcer of hand, limited to breakdown of skin (HCC)    Chronic bronchitis (HCC)    Severe episode of recurrent major depressive disorder, without psychotic features ([place] [address] )    Memory impairment    Stage 4 chronic kidney disease ([place] [address] )    Morbid obesity with BMI of 45 0-49 9, adult ([place] [address] )    History of heart artery stent    Urinary tract infection associated with cystostomy catheter ([place] [address] )    Neurogenic bladder    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 heart failure (HCC)    Prolonged QT interval    Other proteinuria    Gastroesophageal reflux disease without esophagitis    Acquired hypothyroidism    Mixed hyperlipidemia    Other artificial openings of urinary tract status (HCC)    Continuous opioid dependence ([place] [address] )    Adrenal nodule (HCC)    Stable angina (HCC)    Volume overload    Encounter for examination following treatment at hospital    ALFRED (acute kidney injury) [facility])      Past Medical and Surgical History:     Past Medical History:   Diagnosis Date    Abnormal liver function     Anemia     Anxiety     Arthritis     Chronic kidney disease     stage 3    Chronic narcotic dependence ([facility])     Chronic pain disorder     lower back, hands , neck and knees    Coronary artery disease     Depression     Diabetes mellitus ([place] [address] )     Elevated troponin 2/11/2022    GERD (gastroesophageal reflux disease)     no meds at present    Heart murmur     murmur    Hyperlipidemia     Hypertension     Neurogenic bladder     Open toe wound 12/2020    right big toe open calus but is dry at present    Renal disorder     Shortness of breath     exertion    Sleep apnea     doesn't use cpap    Suprapubic catheter [facility])      Past Surgical History:   Procedure Laterality Date    AMPUTATION      ANGIOPLASTY  2017    5    BREAST EXCISIONAL BIOPSY Left     BREAST SURGERY      CARDIAC CATHETERIZATION      CARPAL TUNNEL RELEASE Bilateral     CERVICAL FUSION      HYSTERECTOMY  2008    IR SUPRAPUBIC CATHETER PLACEMENT  6/15/2021    KNEE SURGERY      OOPHORECTOMY  2008    NV EXC SKIN BENIG 3 1-4 CM REMAINDR BODY N/A 12/21/2020    Procedure: EXCISION SEBACEOUS CYST X 5 SCALP;  Surgeon: Stevie Veliz MD;  Location:  MAIN OR;  Service: General    TOE AMPUTATION Left     TRIGGER FINGER RELEASE Right     4th Finger      Family History:     Family History   Problem Relation Age of Onset    Stroke Father     Heart disease Father     No Known Problems Mother     No Known Problems Sister     No Known Problems Daughter     No Known Problems Maternal Grandmother     No Known Problems Maternal Grandfather     No Known Problems Paternal Grandmother     No Known Problems Paternal Grandfather     No Known Problems Maternal Aunt     No Known Problems Maternal Aunt     No Known Problems Maternal Aunt     No Known Problems Maternal Aunt     No Known Problems Maternal Aunt     No Known Problems Maternal Aunt     No Known Problems Paternal Aunt       Social History:     Social History     Socioeconomic History    Marital status:      Spouse name: None    Number of children: None    Years of education: None    Highest education level: None   Occupational History    None   Tobacco Use    Smoking status: Former Smoker     Packs/day: 1 00     Years: 35 00     Pack years: 35 00     Types: Cigarettes     Quit date: 2012     Years since quitting: 10 2    Smokeless tobacco: Never Used   Vaping Use    Vaping Use: Never used   Substance and Sexual Activity    Alcohol use: Not Currently    Drug use: Never    Sexual activity: Not Currently   Other Topics Concern    None   Social History Narrative    None     Social Determinants of Health     Financial Resource Strain: Low Risk     Difficulty of Paying Living Expenses: Not hard at all   Food Insecurity: No Food Insecurity    Worried About Running Out of Food in the Last Year: Never true    Henry of Food in the Last Year: Never true   Transportation Needs: No Transportation Needs    Lack of Transportation (Medical):  No    Lack of Transportation (Non-Medical):  No   Physical Activity: Not on file   Stress: Not on file   Social Connections: Not on file   Intimate Partner Violence: Not on file   Housing Stability: Low Risk     Unable to Pay for Housing in the Last Year: No    Number of Places Lived in the Last Year: 1    Unstable Housing in the Last Year: No      Medications and Allergies:     Current Outpatient Medications   Medication Sig Dispense Refill    allopurinol (ZYLOPRIM) 300 mg tablet Take 1 tablet (300 mg total) by mouth daily 90 tablet 1    amLODIPine (NORVASC) 5 mg tablet Take 1 tablet (5 mg total) by mouth daily 90 tablet 1    aspirin (ECOTRIN LOW STRENGTH) 81 mg EC tablet Take 1 tablet (81 mg total) by mouth daily 90 tablet 1    atorvastatin (LIPITOR) 40 mg tablet Take 1 tablet (40 mg total) by mouth daily 90 tablet 1    bisoprolol (ZEBETA) 5 mg tablet Take 1 tablet (5 mg total) by mouth daily 90 tablet 3    citalopram (CeleXA) 40 mg tablet Take 1 tablet (40 mg total) by mouth daily 90 tablet 1    clopidogrel (PLAVIX) 75 mg tablet Take 1 tablet (75 mg total) by mouth daily 90 tablet 1    docusate sodium (COLACE) 100 mg capsule Take 1 capsule (100 mg total) by mouth 2 (two) times a day 10 capsule 0    Dulaglutide (Trulicity) 1 5 GZ/6 5RK SOPN Inject 0 5 mL (1 5 mg total) under the skin once a week 6 mL 1    ferrous sulfate 325 (65 Fe) mg tablet Take 1 tablet (325 mg total) by mouth every other day 45 tablet 1    gabapentin (NEURONTIN) 800 mg tablet Take 1 tablet (800 mg total) by mouth 4 (four) times a day 120 tablet 2    HYDROcodone-acetaminophen (NORCO) 5-325 mg per tablet Take 1 tablet by mouth 3 (three) times a day as needed for pain For ongoing pain Max Daily Amount: 3 tablets 90 tablet 0    insulin glargine (Lantus SoloStar) 100 units/mL injection pen Inject 50 Units under the skin every 12 (twelve) hours 30 mL 2    insulin lispro (HumaLOG) 100 units/mL injection pen Inject 20 Units under the skin 3 (three) times a day with meals 20 mL 2    Insulin Pen Needle (BD Pen Needle Micro U/F) 32G X 6 MM MISC Use 5 (five) times a day 200 each 5    Insulin Syringe-Needle U-100 (BD Veo Insulin Syringe U/F) 31G X 15/64" 0 5 ML MISC Inject under the skin 3 (three) times a day 100 each 2    isosorbide mononitrate (IMDUR) 60 mg 24 hr tablet TAKE 1 TABLET BY MOUTH EVERY DAY 90 tablet 2    Lancets (OneTouch Delica Plus [id number]) MISC USE TO TEST 3 TIMES DAILY 100 each 2    levothyroxine 50 mcg tablet TAKE 1 TABLET BY MOUTH EVERY DAY 60 tablet 0    naloxone (NARCAN) 4 mg/0 1 mL nasal spray Administer 1 spray into a nostril  If breathing does not return to normal or if breathing difficulty resumes after 2-3 minutes, give another dose in the other nostril using a new spray  1 each 1    nitroglycerin (NITROSTAT) 0 4 mg SL tablet Place 1 tablet (0 4 mg total) under the tongue every 5 (five) minutes as needed for chest pain 25 tablet 1    nystatin (MYCOSTATIN) powder Apply topically 2 (two) times a day 30 g 1    OLANZapine (ZyPREXA) 5 mg tablet Take 1 tablet (5 mg total) by mouth daily at bedtime 90 tablet 0    ondansetron (ZOFRAN) 4 mg tablet Take 1 tablet (4 mg total) by mouth every 8 (eight) hours as needed for nausea or vomiting 20 tablet 0    OneTouch Ultra test strip USE 1 EACH DAILY USE AS INSTRUCTED 100 strip 2    pantoprazole (PROTONIX) 40 mg tablet TAKE 1 TABLET BY MOUTH TWICE A  tablet 1    polyethylene glycol (GLYCOLAX) 17 GM/SCOOP powder Take 17 g by mouth daily 255 g 1    tiZANidine (ZANAFLEX) 4 mg tablet Take 1 tablet (4 mg total) by mouth every 8 (eight) hours as needed for muscle spasms 90 tablet 0    torsemide (DEMADEX) 20 mg tablet Take 2 tablets (40 mg total) by mouth daily 180 tablet 2     No current facility-administered medications for this visit       Allergies   Allergen Reactions    Codeine      Other reaction(s): Nausea and Vomiting    Latex Itching      Immunizations: Immunization History   Administered Date(s) Administered    COVID-19 MODERNA VACC 0 5 ML IM 04/07/2021, 05/10/2021    INFLUENZA 10/01/2017    Influenza Quadrivalent Preservative Free 3 years and older IM 11/11/2019    Influenza Split High Dose Preservative Free IM 10/01/2016    Influenza, recombinant, quadrivalent,injectable, preservative free 11/10/2020, 10/13/2021    Influenza, seasonal, injectable, preservative free 02/15/2013    Pneumococcal Conjugate 13-Valent 12/16/2021    Pneumococcal Polysaccharide PPV23 01/01/2012, 02/15/2013      Health Maintenance:         Topic Date Due    Hepatitis C Screening  Never done    HIV Screening  Never done    Lung Cancer Screening  Never done    Breast Cancer Screening: Mammogram  11/24/2021    Cervical Cancer Screening  10/13/2022 (Originally 9/28/1981)    Colorectal Cancer Screening  08/29/2022         Topic Date Due    DTaP,Tdap,and Td Vaccines (1 - Tdap) Never done    COVID-19 Vaccine (3 - Booster for Alexis Longest series) 10/10/2021      Medicare Screening Tests and Risk Assessments:     Shannon Bass is here for her Subsequent Wellness visit  Health Risk Assessment:   Patient rates overall health as fair  Patient feels that their physical health rating is slightly better  Patient is satisfied with their life  Eyesight was rated as slightly worse  Hearing was rated as same  Patient feels that their emotional and mental health rating is slightly worse  Patients states they are never, rarely angry  Patient states they are often unusually tired/fatigued  Pain experienced in the last 7 days has been a lot  Patient's pain rating has been 7/10  Patient states that she has experienced no weight loss or gain in last 6 months  Depression Screening:   PHQ-9 Score: 6      Fall Risk Screening:    In the past year, patient has experienced: history of falling in past year    Number of falls: 1  Injured during fall?: No    Feels unsteady when standing or walking?: Yes Worried about falling?: Yes      Urinary Incontinence Screening:   Patient has not leaked urine accidently in the last six months  Home Safety:  Patient has trouble with stairs inside or outside of their home  Patient has working smoke alarms and has no working carbon monoxide detector  Home safety hazards include: medications that cause fatigue  Nutrition:   Current diet is Diabetic and Limited junk food  Medications:   Patient is currently taking over-the-counter supplements  OTC medications include: see medication list  Patient is able to manage medications  Multi vitamin & other vitamins    Activities of Daily Living (ADLs)/Instrumental Activities of Daily Living (IADLs):   Walk and transfer into and out of bed and chair?: Yes  Dress and groom yourself?: Yes    Bathe or shower yourself?: Yes    Feed yourself?  Yes  Do your laundry/housekeeping?: No  Manage your money, pay your bills and track your expenses?: Yes  Make your own meals?: No    Do your own shopping?: Yes    Previous Hospitalizations:   Any hospitalizations or ED visits within the last 12 months?: Yes    How many hospitalizations have you had in the last year?: 1-2    Advance Care Planning:     Five wishes given: Yes      Cognitive Screening:   Provider or family/friend/caregiver concerned regarding cognition?: No    PREVENTIVE SCREENINGS      Cardiovascular Screening:    General: History Lipid Disorder    Due for: Lipid Panel      Diabetes Screening:     General: Screening Not Indicated and History Diabetes      Colorectal Cancer Screening:     General: Screening Current      Breast Cancer Screening:     General: Risks and Benefits Discussed    Due for: Mammogram        Cervical Cancer Screening:    General: Screening Not Indicated      Osteoporosis Screening:    General: Risks and Benefits Discussed    Due for: DXA Axial      Abdominal Aortic Aneurysm (AAA) Screening:        General: Screening Not Indicated      Lung Cancer Screening: General: Risks and Benefits Discussed    Due for: Low Dose CT (LDCT)      Hepatitis C Screening:    General: Screening Not Indicated    Screening, Brief Intervention, and Referral to Treatment (SBIRT)    Screening  Typical number of drinks in a day: 0  Typical number of drinks in a week: 0  Interpretation: Low risk drinking behavior  Single Item Drug Screening:  How often have you used an illegal drug (including marijuana) or a prescription medication for non-medical reasons in the past year? never    Single Item Drug Screen Score: 0  Interpretation: Negative screen for possible drug use disorder    Review of Current Opioid Use    Opioid Risk Tool (ORT) Interpretation: Complete Opioid Risk Tool (ORT)    No exam data present     Physical Exam:     /84 (BP Location: Right arm, Patient Position: Sitting, Cuff Size: Standard)   Pulse 75   Temp 97 5 °F (36 4 °C) (Temporal)   Resp 18   Ht 5' 8" (1 727 m)   Wt 128 kg (281 lb 11 2 oz)   SpO2 98%   BMI 42 83 kg/m²     Physical Exam  Vitals and nursing note reviewed  Constitutional:       General: She is not in acute distress  Appearance: She is well-developed  HENT:      Head: Normocephalic and atraumatic  Right Ear: External ear normal       Left Ear: External ear normal    Eyes:      General:         Right eye: No discharge  Left eye: No discharge  Conjunctiva/sclera: Conjunctivae normal    Cardiovascular:      Rate and Rhythm: Normal rate and regular rhythm  Pulmonary:      Effort: Pulmonary effort is normal  No respiratory distress  Breath sounds: Normal breath sounds  Musculoskeletal:      Cervical back: Neck supple  Right lower leg: Edema present  Left lower leg: Edema present  Skin:     General: Skin is warm and dry  Neurological:      Mental Status: She is alert and oriented to person, place, and time     Psychiatric:         Behavior: Behavior normal           CHERELLE Ortega

## 2022-03-29 NOTE — PROGRESS NOTES
[facility] - Outpatient Follow-up Note  Antione Cook 63 y o  female MRN: [id number]  Encounter: [id number]          ASSESSMENT AND PLAN:      1  Right upper quadrant abdominal pain  2  Chronic RLQ pain:  She admits to right-sided abdominal pain in both her upper and lower quadrant  She does not know of anything that makes this better or worse  She is planned to have EGD and colonoscopy, will plan for CT scan abdomen and pelvis to rule out underlying pathology  - CT abdomen pelvis wo contrast; Future    3  Gastroesophageal reflux disease, unspecified whether esophagitis present  4  Constipation, unspecified constipation type  5  Anemia, unspecified type: She has chronic acid reflux, nausea and abdominal bloating  She was previously on pantoprazole once daily but her last visit this was increased to twice daily dosing  She thinks that this did help her symptoms  She also suffers from constipation  She is not currently taking MiraLax as recommended but states that it does help when she uses it  She has normocytic anemia from July of 2021  She does have a history of iron deficiency anemia that was present most recently up to a February 2022 repeat lab shows iron level 54, iron sat of 23 and a ferritin of 51  She does have multiple underlying issues that could be contributing including chronic kidney disease but cannot rule out GI loss  She was plan for EGD and colonoscopy but had not gotten cardiac clearance  These are now scheduled for May 2022   -continue protonix 40mg bid  -continue miralax 17 g daily   -EGD with gastric and duodenal biopsies  -colonoscopy     ______________________________________________________________________    SUBJECTIVE:  Antione Cook is a 28-year-old female with a past medical history of GERD, diabetes type 2, CAD status post PCI on aspirin Plavix, hypertension, CHF, kidney disease who is here for follow-up after EGD and colonoscopy    This appointment was scheduled to follow-up after her procedures, which were canceled  Her procedures were canceled due to needing cardiac clearance and she did not have this done  She did see her cardiologist last month and her procedures are now scheduled for May 2022  She continues to complain of chronic symptoms of acid reflux, nausea, right-sided abdominal pain including the right upper quadrant and right lower quadrant  She was previously on Protonix 40 milligrams once daily in the morning but her symptoms persisted  Last visit she was recommended to increase this to twice daily and start MiraLax for constipation  She is not using MiraLax but admits that her symptoms of constipation seemed to be improved lately  She also has a history of normocytic anemia  And she was recommended to have EGD and colonoscopy  And she reports having an endoscopy twice in the past that were normal   No history of colonoscopy in the past       REVIEW OF SYSTEMS IS OTHERWISE NEGATIVE        Historical Information   Past Medical History:   Diagnosis Date    Abnormal liver function     Anemia     Anxiety     Arthritis     Chronic kidney disease     stage 3    Chronic narcotic dependence (HCC)     Chronic pain disorder     lower back, hands , neck and knees    Coronary artery disease     Depression     Diabetes mellitus ([facility] [address] )     Elevated troponin 2/11/2022    GERD (gastroesophageal reflux disease)     no meds at present    Heart murmur     murmur    Hyperlipidemia     Hypertension     Neurogenic bladder     Open toe wound 12/2020    right big toe open calus but is dry at present    Renal disorder     Shortness of breath     exertion    Sleep apnea     doesn't use cpap    Suprapubic catheter [facility])      Past Surgical History:   Procedure Laterality Date    AMPUTATION      ANGIOPLASTY  2017    5    BREAST EXCISIONAL BIOPSY Left     BREAST SURGERY      CARDIAC CATHETERIZATION      CARPAL TUNNEL RELEASE Bilateral     CERVICAL FUSION      HYSTERECTOMY  2008    IR SUPRAPUBIC CATHETER PLACEMENT  6/15/2021    KNEE SURGERY      OOPHORECTOMY  2008    MA EXC SKIN BENIG 3 1-4 CM REMAINDR BODY N/A 12/21/2020    Procedure: EXCISION SEBACEOUS CYST X 5 SCALP;  Surgeon: Rafael Mcdonald MD;  Location:  MAIN OR;  Service: General    TOE AMPUTATION Left     TRIGGER FINGER RELEASE Right     4th Finger     Social History   Social History     Substance and Sexual Activity   Alcohol Use Not Currently     Social History     Substance and Sexual Activity   Drug Use Never     Social History     Tobacco Use   Smoking Status Former Smoker    Packs/day: 1 00    Years: 35 00    Pack years: 35 00    Types: Cigarettes    Quit date: 2012    Years since quitting: 10 2   Smokeless Tobacco Never Used     Family History   Problem Relation Age of Onset    Stroke Father     Heart disease Father     No Known Problems Mother     No Known Problems Sister     No Known Problems Daughter     No Known Problems Maternal Grandmother     No Known Problems Maternal Grandfather     No Known Problems Paternal Grandmother     No Known Problems Paternal Grandfather     No Known Problems Maternal Aunt     No Known Problems Maternal Aunt     No Known Problems Maternal Aunt     No Known Problems Maternal Aunt     No Known Problems Maternal Aunt     No Known Problems Maternal Aunt     No Known Problems Paternal Aunt        Meds/Allergies       Current Outpatient Medications:     allopurinol (ZYLOPRIM) 300 mg tablet    amLODIPine (NORVASC) 5 mg tablet    aspirin (ECOTRIN LOW STRENGTH) 81 mg EC tablet    atorvastatin (LIPITOR) 40 mg tablet    bisoprolol (ZEBETA) 5 mg tablet    citalopram (CeleXA) 40 mg tablet    clopidogrel (PLAVIX) 75 mg tablet    collagenase (SANTYL) ointment    Continuous Blood Gluc  (FreeStyle [place]) YOUNG    Continuous Blood Gluc Sensor (RoobiqStyle [place] 14 Day Sensor) MISC    Diclofenac Sodium (VOLTAREN) 1 %    diphenhydrAMINE (BENADRYL) 25 mg capsule    docusate sodium (COLACE) 100 mg capsule    ferrous sulfate 325 (65 Fe) mg tablet    gabapentin (NEURONTIN) 600 MG tablet    HYDROcodone-acetaminophen (NORCO) 5-325 mg per tablet    insulin glargine (Lantus SoloStar) 100 units/mL injection pen    insulin lispro (HumaLOG) 100 units/mL injection pen    Insulin Pen Needle (BD Pen Needle Micro U/F) 32G X 6 MM MISC    Insulin Syringe-Needle U-100 (BD Veo Insulin Syringe U/F) 31G X 15/64" 0 5 ML MISC    isosorbide mononitrate (IMDUR) 60 mg 24 hr tablet    Lancets (OneTouch Delica Plus [id number]) MISC    levothyroxine 50 mcg tablet    naloxone (NARCAN) 4 mg/0 1 mL nasal spray    nitroglycerin (NITROSTAT) 0 4 mg SL tablet    nystatin (MYCOSTATIN) powder    OLANZapine (ZyPREXA) 5 mg tablet    ondansetron (ZOFRAN) 4 mg tablet    OneTouch Ultra test strip    pantoprazole (PROTONIX) 40 mg tablet    polyethylene glycol (GLYCOLAX) 17 GM/SCOOP powder    silver sulfadiazine (SILVADENE,SSD) 1 % cream    tiZANidine (ZANAFLEX) 2 mg tablet    torsemide (DEMADEX) 20 mg tablet    Trulicity 1 5 DH/0 4GY SOPN    sucralfate (CARAFATE) 1 g/10 mL suspension    Allergies   Allergen Reactions    Codeine      Other reaction(s): Nausea and Vomiting    Latex Itching           Objective     Blood pressure 139/72, pulse 59, temperature (!) 95 8 °F (35 4 °C), not currently breastfeeding  There is no height or weight on file to calculate BMI  PHYSICAL EXAM:      General Appearance:   Alert, cooperative, no distress   HEENT:   Normocephalic, atraumatic, anicteric      Neck:  Supple, symmetrical, trachea midline   Lungs:   Clear to auscultation bilaterally; no rales, rhonchi or wheezing; respirations unlabored    Heart[de-identified]   Regular rate and rhythm; no murmur, rub, or gallop     Abdomen:   Soft, non-tender, non-distended; normal bowel sounds; no masses, no organomegaly    Genitalia:   Deferred    Rectal:   Deferred    Extremities:  No cyanosis, clubbing or edema    Pulses:  2+ and symmetric    Skin:  No jaundice, rashes, or lesions    Lymph nodes:  No palpable cervical lymphadenopathy        Lab Results:   No visits with results within 1 Day(s) from this visit  Latest known visit with results is:   Appointment on 02/24/2022   Component Date Value    Sodium 02/24/2022 142     Potassium 02/24/2022 4 8     Chloride 02/24/2022 107     CO2 02/24/2022 28     ANION GAP 02/24/2022 7     BUN 02/24/2022 69*    Creatinine 02/24/2022 3 47*    Glucose, Fasting 02/24/2022 216*    Calcium 02/24/2022 8 5     eGFR 02/24/2022 13          Radiology Results:   No results found

## 2022-03-29 NOTE — ASSESSMENT & PLAN NOTE
Wt Readings from Last 3 Encounters:   03/29/22 128 kg (281 lb 11 2 oz)   03/04/22 132 kg (290 lb)   02/22/22 126 kg (278 lb 12 8 oz)

## 2022-03-29 NOTE — PATIENT INSTRUCTIONS
Medicare Preventive Visit Patient Instructions  Thank you for completing your Welcome to Medicare Visit or Medicare Annual Wellness Visit today  Your next wellness visit will be due in one year (3/30/2023)  The screening/preventive services that you may require over the next 5-10 years are detailed below  Some tests may not apply to you based off risk factors and/or age  Screening tests ordered at today's visit but not completed yet may show as past due  Also, please note that scanned in results may not display below  Preventive Screenings:  Service Recommendations Previous Testing/Comments   Colorectal Cancer Screening  * Colonoscopy    * Fecal Occult Blood Test (FOBT)/Fecal Immunochemical Test (FIT)  * Fecal DNA/Cologuard Test  * Flexible Sigmoidoscopy Age: 54-65 years old   Colonoscopy: every 10 years (may be performed more frequently if at higher risk)  OR  FOBT/FIT: every 1 year  OR  Cologuard: every 3 years  OR  Sigmoidoscopy: every 5 years  Screening may be recommended earlier than age 48 if at higher risk for colorectal cancer  Also, an individualized decision between you and your healthcare provider will decide whether screening between the ages of 74-80 would be appropriate  Colonoscopy: Not on file  FOBT/FIT: 08/29/2021  Cologuard: Not on file  Sigmoidoscopy: Not on file    Screening Current     Breast Cancer Screening Age: 36 years old  Frequency: every 1-2 years  Not required if history of left and right mastectomy Mammogram: 11/24/2020    Screening Current   Cervical Cancer Screening Between the ages of 21-29, pap smear recommended once every 3 years  Between the ages of 33-67, can perform pap smear with HPV co-testing every 5 years     Recommendations may differ for women with a history of total hysterectomy, cervical cancer, or abnormal pap smears in past  Pap Smear: Not on file        Hepatitis C Screening Once for adults born between 1945 and 1965  More frequently in patients at high risk for Hepatitis C Hep C Antibody: Not on file        Diabetes Screening 1-2 times per year if you're at risk for diabetes or have pre-diabetes Fasting glucose: 216 mg/dL   A1C: 8 5    Screening Not Indicated  History Diabetes   Cholesterol Screening Once every 5 years if you don't have a lipid disorder  May order more often based on risk factors  Lipid panel: 08/03/2021    Screening Not Indicated  History Lipid Disorder     Other Preventive Screenings Covered by Medicare:  1  Abdominal Aortic Aneurysm (AAA) Screening: covered once if your at risk  You're considered to be at risk if you have a family history of AAA  2  Lung Cancer Screening: covers low dose CT scan once per year if you meet all of the following conditions: (1) Age 50-69; (2) No signs or symptoms of lung cancer; (3) Current smoker or have quit smoking within the last 15 years; (4) You have a tobacco smoking history of at least 30 pack years (packs per day multiplied by number of years you smoked); (5) You get a written order from a healthcare provider  3  Glaucoma Screening: covered annually if you're considered high risk: (1) You have diabetes OR (2) Family history of glaucoma OR (3)  aged 48 and older OR (3)  American aged 72 and older  3  Osteoporosis Screening: covered every 2 years if you meet one of the following conditions: (1) You're estrogen deficient and at risk for osteoporosis based off medical history and other findings; (2) Have a vertebral abnormality; (3) On glucocorticoid therapy for more than 3 months; (4) Have primary hyperparathyroidism; (5) On osteoporosis medications and need to assess response to drug therapy  · Last bone density test (DXA Scan): Not on file  5  HIV Screening: covered annually if you're between the age of 12-76  Also covered annually if you are younger than 13 and older than 72 with risk factors for HIV infection   For pregnant patients, it is covered up to 3 times per pregnancy  Immunizations:  Immunization Recommendations   Influenza Vaccine Annual influenza vaccination during flu season is recommended for all persons aged >= 6 months who do not have contraindications   Pneumococcal Vaccine (Prevnar and Pneumovax)  * Prevnar = PCV13  * Pneumovax = PPSV23   Adults 25-60 years old: 1-3 doses may be recommended based on certain risk factors  Adults 72 years old: Prevnar (PCV13) vaccine recommended followed by Pneumovax (PPSV23) vaccine  If already received PPSV23 since turning 65, then PCV13 recommended at least one year after PPSV23 dose  Hepatitis B Vaccine 3 dose series if at intermediate or high risk (ex: diabetes, end stage renal disease, liver disease)   Tetanus (Td) Vaccine - COST NOT COVERED BY MEDICARE PART B Following completion of primary series, a booster dose should be given every 10 years to maintain immunity against tetanus  Td may also be given as tetanus wound prophylaxis  Tdap Vaccine - COST NOT COVERED BY MEDICARE PART B Recommended at least once for all adults  For pregnant patients, recommended with each pregnancy  Shingles Vaccine (Shingrix) - COST NOT COVERED BY MEDICARE PART B  2 shot series recommended in those aged 48 and above     Health Maintenance Due:      Topic Date Due    Hepatitis C Screening  Never done    HIV Screening  Never done    Lung Cancer Screening  Never done    Breast Cancer Screening: Mammogram  11/24/2021    Cervical Cancer Screening  10/13/2022 (Originally [birth year]1983)    Colorectal Cancer Screening  08/29/2022     Immunizations Due:      Topic Date Due    DTaP,Tdap,and Td Vaccines (1 - Tdap) Never done    COVID-19 Vaccine (3 - Booster for Lizz Greulich series) 10/10/2021     Advance Directives   What are advance directives? Advance directives are legal documents that state your wishes and plans for medical care  These plans are made ahead of time in case you lose your ability to make decisions for yourself   Advance directives can apply to any medical decision, such as the treatments you want, and if you want to donate organs  What are the types of advance directives? There are many types of advance directives, and each state has rules about how to use them  You may choose a combination of any of the following:  · Living will: This is a written record of the treatment you want  You can also choose which treatments you do not want, which to limit, and which to stop at a certain time  This includes surgery, medicine, IV fluid, and tube feedings  · Durable power of  for healthcare [facility]): This is a written record that states who you want to make healthcare choices for you when you are unable to make them for yourself  This person, called a proxy, is usually a family member or a friend  You may choose more than 1 proxy  · Do not resuscitate (DNR) order:  A DNR order is used in case your heart stops beating or you stop breathing  It is a request not to have certain forms of treatment, such as CPR  A DNR order may be included in other types of advance directives  · Medical directive: This covers the care that you want if you are in a coma, near death, or unable to make decisions for yourself  You can list the treatments you want for each condition  Treatment may include pain medicine, surgery, blood transfusions, dialysis, IV or tube feedings, and a ventilator (breathing machine)  · Values history: This document has questions about your views, beliefs, and how you feel and think about life  This information can help others choose the care that you would choose  Why are advance directives important? An advance directive helps you control your care  Although spoken wishes may be used, it is better to have your wishes written down  Spoken wishes can be misunderstood, or not followed  Treatments may be given even if you do not want them   An advance directive may make it easier for your family to make difficult choices about your care  Weight Management   Why it is important to manage your weight:  Being overweight increases your risk of health conditions such as heart disease, high blood pressure, type 2 diabetes, and certain types of cancer  It can also increase your risk for osteoarthritis, sleep apnea, and other respiratory problems  Aim for a slow, steady weight loss  Even a small amount of weight loss can lower your risk of health problems  How to lose weight safely:  A safe and healthy way to lose weight is to eat fewer calories and get regular exercise  You can lose up about 1 pound a week by decreasing the number of calories you eat by 500 calories each day  Healthy meal plan for weight management:  A healthy meal plan includes a variety of foods, contains fewer calories, and helps you stay healthy  A healthy meal plan includes the following:  · Eat whole-grain foods more often  A healthy meal plan should contain fiber  Fiber is the part of grains, fruits, and vegetables that is not broken down by your body  Whole-grain foods are healthy and provide extra fiber in your diet  Some examples of whole-grain foods are whole-wheat breads and pastas, oatmeal, brown rice, and bulgur  · Eat a variety of vegetables every day  Include dark, leafy greens such as spinach, kale, debbie greens, and mustard greens  Eat yellow and orange vegetables such as carrots, sweet potatoes, and winter squash  · Eat a variety of fruits every day  Choose fresh or canned fruit (canned in its own juice or light syrup) instead of juice  Fruit juice has very little or no fiber  · Eat low-fat dairy foods  Drink fat-free (skim) milk or 1% milk  Eat fat-free yogurt and low-fat cottage cheese  Try low-fat cheeses such as mozzarella and other reduced-fat cheeses  · Choose meat and other protein foods that are low in fat  Choose beans or other legumes such as split peas or lentils   Choose fish, skinless poultry (chicken or turkey), or lean cuts of red meat (beef or pork)  Before you cook meat or poultry, cut off any visible fat  · Use less fat and oil  Try baking foods instead of frying them  Add less fat, such as margarine, sour cream, regular salad dressing and mayonnaise to foods  Eat fewer high-fat foods  Some examples of high-fat foods include french fries, doughnuts, ice cream, and cakes  · Eat fewer sweets  Limit foods and drinks that are high in sugar  This includes candy, cookies, regular soda, and sweetened drinks  Exercise:  Exercise at least 30 minutes per day on most days of the week  Some examples of exercise include walking, biking, dancing, and swimming  You can also fit in more physical activity by taking the stairs instead of the elevator or parking farther away from stores  Ask your healthcare provider about the best exercise plan for you  Narcotic (Opioid) Safety    Use narcotics safely:  · Take prescribed narcotics exactly as directed  · Do not give narcotics to others or take narcotics that belong to someone else  · Do not mix narcotics without medicines or alcohol  · Do not drive or operate heavy machinery after you take the narcotic  · Monitor for side effects and notify your healthcare provider if you experienced side effects such as nausea, sleepiness, itching, or trouble thinking clearly  Manage constipation:    Constipation is the most common side effect of narcotic medicine  Constipation is when you have hard, dry bowel movements, or you go longer than usual between bowel movements  Tell your healthcare provider about all changes in your bowel movements while you are taking narcotics  He or she may recommend laxative medicine to help you have a bowel movement  He or she may also change the kind of narcotic you are taking, or change when you take it  The following are more ways you can prevent or relieve constipation:    · Drink liquids as directed    You may need to drink extra liquids to help soften and move your bowels  Ask how much liquid to drink each day and which liquids are best for you  · Eat high-fiber foods  This may help decrease constipation by adding bulk to your bowel movements  High-fiber foods include fruits, vegetables, whole-grain breads and cereals, and beans  Your healthcare provider or dietitian can help you create a high-fiber meal plan  Your provider may also recommend a fiber supplement if you cannot get enough fiber from food  · Exercise regularly  Regular physical activity can help stimulate your intestines  Walking is a good exercise to prevent or relieve constipation  Ask which exercises are best for you  · Schedule a time each day to have a bowel movement  This may help train your body to have regular bowel movements  Bend forward while you are on the toilet to help move the bowel movement out  Sit on the toilet for at least 10 minutes, even if you do not have a bowel movement  Store narcotics safely:   · Store narcotics where others cannot easily get them  Keep them in a locked cabinet or secure area  Do not  keep them in a purse or other bag you carry with you  A person may be looking for something else and find the narcotics  · Make sure narcotics are stored out of the reach of children  A child can easily overdose on narcotics  Narcotics may look like candy to a small child  The best way to dispose of narcotics: The laws vary by country and area  In the [place], the best way is to return the narcotics through a take-back program  This program is offered by the [employer])  The following are options for using the program:  · Take the narcotics to a EUF[employer]IA collection site  The site is often a law enforcement center  Call your local law enforcement center for scheduled take-back days in your area  You will be given information on where to go if the collection site is in a different location    · Take the narcotics to an approved pharmacy or hospital   A pharmacy or hospital may be set up as a collection site  You will need to ask if it is a EUF[employer]IA collection site if you were not directed there  A pharmacy or doctor's office may not be able to take back narcotics unless it is a EUFEMIA site  · Use a mail-back system  This means you are given containers to put the narcotics into  You will then mail them in the containers  · Use a take-back drop box  This is a place to leave the narcotics at any time  People and animals will not be able to get into the box  Your local law enforcement agency can tell you where to find a drop box in your area  Other ways to manage pain:   · Ask your healthcare provider about non-narcotic medicines to control pain  Nonprescription medicines include NSAIDs (such as ibuprofen) and acetaminophen  Prescription medicines include muscle relaxers, antidepressants, and steroids  · Pain may be managed without any medicines  Some ways to relieve pain include massage, aromatherapy, or meditation  Physical or occupational therapy may also help  For more information:   · Drug Enforcement Administration  [address] [place] [address] [address] Phipps [address]  Phone: 9- 205 - 083-4312  Web Address: [facility]/drug_disposal/    · [address]  and Drug Administration  [facility] , [address] [facility]  Phone: 5- 274 - 582-1587  Web Address: http://[employer]/     © Copyright [employer] 2018 Information is for End User's use only and may not be sold, redistributed or otherwise used for commercial purposes   All illustrations and images included in CareNotes® are the copyrighted property of A D A M , Inc  or [address] [place] [address] [employer]

## 2022-04-13 ENCOUNTER — OFFICE VISIT (OUTPATIENT)
Dept: CARDIOLOGY CLINIC | Facility: CLINIC | Age: 62
End: 2022-04-13
Payer: COMMERCIAL

## 2022-04-13 VITALS
HEART RATE: 74 BPM | WEIGHT: 282 LBS | BODY MASS INDEX: 42.74 KG/M2 | DIASTOLIC BLOOD PRESSURE: 66 MMHG | HEIGHT: 68 IN | SYSTOLIC BLOOD PRESSURE: 134 MMHG

## 2022-04-13 DIAGNOSIS — Z79.4 TYPE 2 DIABETES MELLITUS WITH STAGE 4 CHRONIC KIDNEY DISEASE, WITH LONG-TERM CURRENT USE OF INSULIN (HCC): ICD-10-CM

## 2022-04-13 DIAGNOSIS — N18.4 STAGE 4 CHRONIC KIDNEY DISEASE (HCC): ICD-10-CM

## 2022-04-13 DIAGNOSIS — I25.10 CORONARY ARTERY DISEASE INVOLVING NATIVE CORONARY ARTERY OF NATIVE HEART WITHOUT ANGINA PECTORIS: ICD-10-CM

## 2022-04-13 DIAGNOSIS — L97.515 DIABETIC ULCER OF TOE OF RIGHT FOOT ASSOCIATED WITH TYPE 2 DIABETES MELLITUS, WITH MUSCLE INVOLVEMENT WITHOUT EVIDENCE OF NECROSIS (HCC): ICD-10-CM

## 2022-04-13 DIAGNOSIS — Z01.810 ENCOUNTER FOR PREPROCEDURAL CARDIOVASCULAR EXAMINATION: Primary | ICD-10-CM

## 2022-04-13 DIAGNOSIS — N18.4 TYPE 2 DIABETES MELLITUS WITH STAGE 4 CHRONIC KIDNEY DISEASE, WITH LONG-TERM CURRENT USE OF INSULIN (HCC): ICD-10-CM

## 2022-04-13 DIAGNOSIS — E66.01 MORBID OBESITY WITH BMI OF 45.0-49.9, ADULT (HCC): ICD-10-CM

## 2022-04-13 DIAGNOSIS — I10 HYPERTENSION, UNSPECIFIED TYPE: ICD-10-CM

## 2022-04-13 DIAGNOSIS — I20.8 STABLE ANGINA (HCC): ICD-10-CM

## 2022-04-13 DIAGNOSIS — E11.621 DIABETIC ULCER OF TOE OF RIGHT FOOT ASSOCIATED WITH TYPE 2 DIABETES MELLITUS, WITH MUSCLE INVOLVEMENT WITHOUT EVIDENCE OF NECROSIS (HCC): ICD-10-CM

## 2022-04-13 DIAGNOSIS — E11.22 TYPE 2 DIABETES MELLITUS WITH STAGE 4 CHRONIC KIDNEY DISEASE, WITH LONG-TERM CURRENT USE OF INSULIN (HCC): ICD-10-CM

## 2022-04-13 PROCEDURE — 3066F NEPHROPATHY DOC TX: CPT | Performed by: NURSE PRACTITIONER

## 2022-04-13 PROCEDURE — 99214 OFFICE O/P EST MOD 30 MIN: CPT

## 2022-04-13 PROCEDURE — 3066F NEPHROPATHY DOC TX: CPT

## 2022-04-13 PROCEDURE — 3075F SYST BP GE 130 - 139MM HG: CPT

## 2022-04-13 PROCEDURE — 3008F BODY MASS INDEX DOCD: CPT | Performed by: NURSE PRACTITIONER

## 2022-04-13 PROCEDURE — 3008F BODY MASS INDEX DOCD: CPT

## 2022-04-13 PROCEDURE — 1036F TOBACCO NON-USER: CPT

## 2022-04-13 PROCEDURE — 3078F DIAST BP <80 MM HG: CPT

## 2022-04-13 NOTE — PROGRESS NOTES
Cardiology   MD Jossue Lancaster MD Belia Deter, DO, MD Tori Hargrove DO, Bull Rivera DO, [facility] - [place]  -------------------------------------------------------------------  [facility]  [address], [place] [address]  [phone]  [phone]  04/13/22  Jocelin Myers  YOB: 1960   MRN: [id number]      Referring Physician: Chichi Rice, [address] [address],  [address] [place] [address]     HPI: Jocelin Myers is a 63 y o  female with:   · Coronary artery disease status post PCI x5 with prior stents seen in the LAD and the circumflex artery  · Coronary artery disease with a total occlusion of the RCA at the ostium  · Chronic Renal failure  · Neurogenic bladder status post suprapubic catheter   · Hypertension  · Diabetes  · Dyslipidemia   · Recent hospitalization for acute CHF Feb 2022    She states for the most part she is feeling well  She denies any chest pain type feelings that her out of the ordinary for her today  She does note lower extremity swelling but she states that this is at its chronic level for her  She has follow-up with Nephrology next month  No angina type symptoms  She is scheduled for EGD and colonoscopy next month  Review of Systems   Constitutional: Negative for chills and fever  HENT: Negative for facial swelling and sore throat  Eyes: Negative for visual disturbance  Respiratory: Negative for cough, chest tightness, shortness of breath and wheezing  Cardiovascular: Positive for leg swelling  Negative for chest pain and palpitations  Gastrointestinal: Negative for abdominal pain, blood in stool, constipation, diarrhea, nausea and vomiting  Endocrine: Negative for cold intolerance and heat intolerance  Genitourinary: Negative for decreased urine volume, difficulty urinating, dysuria and hematuria     Musculoskeletal: Negative for arthralgias, back pain and myalgias  Skin: Negative for rash  Neurological: Negative for dizziness, syncope, weakness and numbness  Psychiatric/Behavioral: Negative for agitation, behavioral problems and confusion  The patient is not nervous/anxious  OBJECTIVE  Vitals:    04/13/22 1559   BP: 134/66   Pulse: 74       Physical Exam  Constitutional: awake, alert and oriented, in no acute distress, no obvious deformities, obese female  Head: Normocephalic, without obvious abnormality, atraumatic  Eyes: conjunctivae clear and moist  Sclera anicteric  No xanthelasmas  Pupils equal bilaterally  Extraocular motions are full  Ear nose mouth and throat: ears are symmetrical bilaterally, hearing appears to be equal bilaterally, no nasal discharge or epistaxis, oropharynx is clear with moist mucous membranes  Neck:  Trachea is midline, neck is supple, no thyromegaly or significant lymphadenopathy, there is full range of motion  Lungs: clear to auscultation bilaterally, no wheezes, no rales, no rhonchi, no accessory muscle use, breathing is nonlabored  Heart: regular rate and rhythm, S1, S2 normal, no murmur, no click, no rub and no gallop, 2+ lower extremity edema  Abdomen:  Obese, soft, non-tender; bowel sounds normal; no masses,  no organomegaly  Psychiatric:  Patient is oriented to time, place, person, mood/affect is negative for depression, anxiety, agitation, appears to have appropriate insight  Skin:  She does have a skin ulcer right lower extremity near her ankle    EKG:  No results found for this visit on 04/13/22       IMPRESSION:  · Coronary artery disease status post PCI x5 with prior stents seen in the LAD and the circumflex artery  · Coronary artery disease with a total occlusion of the RCA at the ostium  · Chronic Renal failure  · Neurogenic bladder status post suprapubic catheter   · Hypertension  · Diabetes  · Dyslipidemia   · Recent hospitalization for acute CHF Feb 2022    DISCUSSION/RECOMMENDATIONS:  Chasity Zhou Today, from a cardiovascular disease standpoint she appears stable    Blood pressure is controlled    Her angina symptoms are under good control, she notes no significant symptoms at this time    She does have lower extremity edema but this is at its chronic level  I suspect her underlying renal disease is playing a role with this   Would continue her current doses of medications today   If She does get recurrent chest pain, would consider adding Ranexa at that time   She may hold Plavix as needed prior to EGD/colonoscopy  Overall will be moderate risk for this procedure given her comorbidities    Neil Wolf DO, [facility] - [place]  --------------------------------------------------------------------------------  TREADMILL STRESS  No results found for this or any previous visit      ----------------------------------------------------------------------------------------------  NUCLEAR STRESS TEST: No results found for this or any previous visit  No results found for this or any previous visit       --------------------------------------------------------------------------------  CATH:  Results for orders placed during the hospital encounter of 21    Cardiac catheterization    Narrative  [facility]  Kermitaznely Yeboah [address] [place] [address], [address] [facility]  [phone]    [facility]    Invasive Cardiovascular Lab Complete Report    Patient: Roderick Ly  MR number: [id number]  Account number: [de-identified]  Study date: 2021  Gender: Female  : 1960  Height: 68 1 in  Weight: 279 4 lb  BSA: 2 36 mï¾²    Allergies: CODEINE, LATEX    Diagnostic Cardiologist:  Angel Duffy MD  Primary Physician:  Read Friends    SUMMARY    CORONARY CIRCULATION:  Proximal RCA: There was a 100 % stenosis  This lesion is a chronic total occlusion  INDICATIONS:  --  Possible CAD: myocardial infarction without ST elevation (NSTEMI)  --  Congestive heart failure with ischemic cardiomyopathy      PROCEDURES PERFORMED    --  Left heart catheterization without ventriculogram   --  Left coronary angiography  --  Right coronary angiography  --  Inpatient  --  Mod Sedation Same Physician Initial 15min  --  Coronary Catheterization (w/ LHC)  PROCEDURE: The risks and alternatives of the procedures and conscious sedation were explained to the patient and informed consent was obtained  The patient was brought to the cath lab and placed on the table  The planned puncture sites  were prepped and draped in the usual sterile fashion  --  Right radial artery access  After performing an Erasto's test to verify adequate ulnar artery supply to the hand, the radial site was prepped  The puncture site was infiltrated with local anesthetic  The vessel was accessed using the  modified Seldinger technique, a wire was advanced into the vessel, and a sheath was advanced over the wire into the vessel  --  Left heart catheterization without ventriculogram  A catheter was advanced over a guidewire into the ascending aorta  After recording ascending aortic pressure, the catheter was advanced across the aortic valve and left ventricular  pressure was recorded  The catheter was pulled back across the aortic valve and into the ascending aorta and pullback pressures were obtained  --  Left coronary artery angiography  A catheter was advanced over a guidewire into the aorta and positioned in the left coronary artery ostium under fluoroscopic guidance  Angiography was performed  --  Right coronary artery angiography  A catheter was advanced over a guidewire into the aorta and positioned in the right coronary artery ostium under fluoroscopic guidance  Angiography was performed  --  Inpatient  --  Mod Sedation Same Physician Initial 15min  --  Coronary Catheterization (w/ LHC)  PROCEDURE COMPLETION: The patient tolerated the procedure well and was discharged from the cath lab  TIMING: Test started at 14:33   Test concluded at 14:45  HEMOSTASIS: The sheath was removed  The site was compressed with a Hemoband  device  Hemostasis was obtained  MEDICATIONS GIVEN: Fentanyl (1OOmcg/2 ml), 50 mcg, IV, at 14:30  Versed (2mg/2ml), 2 mg, IV, at 14:30  Zofran (Ondansetron), 4 mg, IV, at 14:34  1% Lidocaine, 2 ml, subcutaneously, at 14:35  Nitroglycerin  (200mcg/ml), 200 mcg, at 14:37  Verapamil (5mg/2ml), 5 mg, IV, at 14:37  Heparin 1000 units/ml, 4,000 units, IV, at 14:37  CONTRAST GIVEN: 30 ml Visipaque (320mg I /ml)  RADIATION EXPOSURE: Fluoroscopy time: 1 32 min  HEMODYNAMICS: Hemodynamic assessment demonstrated normal LVEDP  12-14    CORONARY VESSELS:   --  The coronary circulation is left dominant  --  Left main: The vessel was medium to large sized  Angiography showed mild atherosclerosis  --  LAD: The vessel was large sized  Angiography showed no evidence of disease  --  Proximal LAD: There was a discrete 30 % stenosis at the site of a prior stent  --  Mid LAD: There was a 0 % stenosis at the site of a prior stent  --  Distal LAD: There was a 0 % stenosis at the site of a prior stent  --  Proximal circumflex: There was a 0 % stenosis at the site of a prior stent  --  RCA: The vessel was small (non-dominant)  --  Proximal RCA: There was a 100 % stenosis  This lesion is a chronic total occlusion  IMPRESSIONS:  Type 2 MI  There is significant single vessel coronary artery disease  RECOMMENDATIONS  The patient should continue with the present medications  DISPOSITION:  The patient left the catheterization laboratory in stable condition  Prepared and signed by    Ray Morgan MD  Signed 08/31/2021 14:55:54    Study diagram    Angiographic findings  Native coronary lesions:  ï¾·Proximal LAD: Lesion 1: discrete, 30 % stenosis, site of prior stent  ï¾·Mid LAD: Lesion 1: 0 % stenosis, site of prior stent  ï¾·Distal LAD: Lesion 1: 0 % stenosis, site of prior stent    ï¾·Proximal circumflex: Lesion 1: 0 % stenosis, site of prior stent  ï¾·Proximal RCA: Lesion 1: 100 % stenosis  Hemodynamic tables    Pressures:  Baseline  Pressures:  - HR: 66  Pressures:  - Rhythm:  Pressures:  -- Aortic Pressure (S/D/M): 153/61/76  Pressures:  -- Left Ventricle (s/edp): 139/12/--  Pressures:  -- Left Ventricle (s/edp): 139/19/--    Outputs:  Baseline  Outputs:  -- CALCULATIONS: [age]  Outputs:  -- CALCULATIONS: Body Surface Area: 2 36  Outputs:  -- CALCULATIONS: Height in cm: 173 00  Outputs:  -- CALCULATIONS: Sex: Female  Outputs:  -- CALCULATIONS: Weight in k 00    --------------------------------------------------------------------------------  ECHO:   Results for orders placed during the hospital encounter of 21    Echo complete with contrast if indicated    Narrative  [facility] [address]    Transthoracic Echocardiogram  2D, M-mode, Doppler, and Color Doppler    Study date:  24-Aug-2021    Patient: Cory Rdz  MR number: [id number]  Account number: [de-identified]  : 1960  Age: 64 years  Gender: Female  Status: Inpatient  Location: [facility]  Height: 68 in  Weight: 312 4 lb  BP: 149/ 79 mmHg    Indications: Heart failure    Diagnoses: I50 9 - Heart failure, unspecified    Sonographer:  CASANDRA Mcfadden  Interpreting Physician:  NICOLE Villafana  Primary Physician:  Elier Dudley MD  Referring Physician:  CHERELLE Springer  Group:  [facility]    SUMMARY    LEFT VENTRICLE:  Systolic function was mildly reduced by visual assessment  Ejection fraction was estimated to be 45 %  There were regional wall motion abnormalities that suggest coronary artery disease  There was severe hypokinesis of the basal-mid anteroseptal and basal-mid inferoseptal wall(s)  There was moderate hypokinesis of the basal-mid inferior wall(s)  Wall thickness was mildly increased    There was mild concentric hypertrophy  Features were consistent with grade 2 diastolic dysfunction  Doppler parameters were consistent with high ventricular filling pressure  E/E' was > 19    VENTRICULAR SEPTUM:  There was dyssynergic motion  These changes are consistent with LBBB  MITRAL VALVE:  There was mild "progressive" mitral stenosis  Mean gradient of 5 mmHg at 73 bpm  MVA by Doppler continuity equation is 2 15 cm2  TRICUSPID VALVE:  There was mild regurgitation  PULMONIC VALVE:  There was trace regurgitation  PERICARDIUM:  A trace pericardial effusion was identified  HISTORY: PRIOR HISTORY: CAD, CKD Risk factors: hypertension and diabetes  PROCEDURE: The study was performed in the [place] [address]  This was a routine study  The transthoracic approach was used  The study included complete 2D imaging, M-mode, complete spectral Doppler, and color Doppler  The heart rate was 76  bpm, at the start of the study  Images were obtained from the parasternal, apical, subcostal, and suprasternal notch acoustic windows  Image quality was adequate  LEFT VENTRICLE: Size was normal  Systolic function was mildly reduced by visual assessment  Ejection fraction was estimated to be 45 %  There were regional wall motion abnormalities that suggest coronary artery disease  There was severe hypokinesis of the basal-mid anteroseptal and basal-mid inferoseptal wall(s)  There was moderate hypokinesis of the basal-mid inferior wall(s)  Wall thickness was mildly increased  There was mild concentric hypertrophy  DOPPLER: Features were consistent with grade 2 diastolic dysfunction  Doppler parameters were consistent with high ventricular filling pressure  E/E' was > 19    VENTRICULAR SEPTUM: There was dyssynergic motion  These changes are consistent with LBBB      RIGHT VENTRICLE: The size was normal  Systolic function was normal  Wall thickness was normal     LEFT ATRIUM: Size was within the limits of normal when indexed for body surface area  LA volume index 31 ml/m2  RIGHT ATRIUM: Size was normal     MITRAL VALVE: Valve structure was normal  There was mild thickening  There was normal leaflet separation  There was mildly restricted mobility of the anterior leaflet  DOPPLER: The transmitral velocity was within the normal range  There  was mild "progressive" mitral stenosis  Mean gradient of 5 mmHg at 73 bpm  MVA by Doppler continuity equation is 2 15 cm2  There was no regurgitation  AORTIC VALVE: The valve was trileaflet  Leaflets exhibited normal thickness and normal cuspal separation  DOPPLER: Transaortic velocity was within the normal range  There was no evidence for stenosis  There was no regurgitation  TRICUSPID VALVE: The valve structure was normal  There was normal leaflet separation  DOPPLER: The transtricuspid velocity was within the normal range  There was no evidence for stenosis  There was mild regurgitation  Estimated peak PA  pressure was 34 mmHg  PULMONIC VALVE: Not well visualized  DOPPLER: There was no evidence for stenosis  There was trace regurgitation  PERICARDIUM: A trace pericardial effusion was identified  The pericardium was normal in appearance  AORTA: The root exhibited normal size  SYSTEMIC VEINS: IVC: The inferior vena cava was normal in size and course   Respirophasic changes were normal     SYSTEM MEASUREMENT TABLES    2D  %FS: 25 15 %  Ao Diam: 2 77 cm  EDV(Teich): 136 76 ml  EF(Teich): 49 26 %  ESV(Teich): 69 39 ml  IVSd: 1 07 cm  LA Area: 30 26 cm2  LA Diam: 4 91 cm  LVEDV MOD A4C: 210 21 ml  LVEF MOD A4C: 46 53 %  LVESV MOD A4C: 112 41 ml  LVIDd: 5 32 cm  LVIDs: 3 99 cm  LVLd A4C: 9 67 cm  LVLs A4C: 8 21 cm  LVPWd: 1 1 cm  RA Area: 18 38 cm2  RVIDd: 3 28 cm  SV MOD A4C: 97 8 ml  SV(Teich): 67 38 ml    CW  TR Vmax: 2 78 m/s  TR maxP 99 mmHg    MM  TAPSE: 2 42 cm    PW  E' Sept: 0 06 m/s  E/E' Sept: 19 84  MV A [place]: 1 49 m/s  MV Dec [place]: 9 08 m/s2  MV DecT: 137 82 ms  MV E Sant[place]: 1 25 m/s  MV E/A Ratio: 0 84  MV PHT: 39 97 ms  MVA By PHT: 5 5 cm2    [employer] Accredited Echocardiography Laboratory    Prepared and electronically signed by    NICOLE Bonilla  Signed 24-Aug-2021 14:54:43    No results found for this or any previous visit     --------------------------------------------------------------------------------  HOLTER  No results found for this or any previous visit      No results found for this or any previous visit     --------------------------------------------------------------------------------  CAROTIDS  No results found for this or any previous visit      --------------------------------------------------------------------------------  There are no diagnoses linked to this encounter    ======================================================    Past Medical History:   Diagnosis Date    Abnormal liver function     Anemia     Anxiety     Arthritis     Chronic kidney disease     stage 3    Chronic narcotic dependence ([facility] )     Chronic pain disorder     lower back, hands , neck and knees    Coronary artery disease     Depression     Diabetes mellitus ([facility] )     Elevated troponin 2/11/2022    GERD (gastroesophageal reflux disease)     no meds at present    Heart murmur     murmur    Hyperlipidemia     Hypertension     Neurogenic bladder     Open toe wound 12/2020    right big toe open calus but is dry at present    Renal disorder     Shortness of breath     exertion    Sleep apnea     doesn't use cpap    Suprapubic catheter [facility])      Past Surgical History:   Procedure Laterality Date    AMPUTATION      ANGIOPLASTY  2017    5    BREAST EXCISIONAL BIOPSY Left     BREAST SURGERY      CARDIAC CATHETERIZATION      CARPAL TUNNEL RELEASE Bilateral     CERVICAL FUSION      HYSTERECTOMY  2008    IR SUPRAPUBIC CATHETER PLACEMENT  6/15/2021    KNEE SURGERY      OOPHORECTOMY  2008    MS EXC SKIN BENIG 3 1-4 CM REMAINDR BODY N/A 12/21/2020    Procedure: EXCISION SEBACEOUS CYST X 5 SCALP;  Surgeon: Cruz Phipps MD;  Location: [address] [place] [address] MAIN OR;  Service: General    TOE AMPUTATION Left     TRIGGER FINGER RELEASE Right     4th Finger         Medications  Current Outpatient Medications   Medication Sig Dispense Refill    allopurinol (ZYLOPRIM) 300 mg tablet Take 1 tablet (300 mg total) by mouth daily 90 tablet 1    amLODIPine (NORVASC) 5 mg tablet Take 1 tablet (5 mg total) by mouth daily 90 tablet 1    aspirin (ECOTRIN LOW STRENGTH) 81 mg EC tablet Take 1 tablet (81 mg total) by mouth daily 90 tablet 1    atorvastatin (LIPITOR) 40 mg tablet Take 1 tablet (40 mg total) by mouth daily 90 tablet 1    bisoprolol (ZEBETA) 5 mg tablet Take 1 tablet (5 mg total) by mouth daily 90 tablet 3    citalopram (CeleXA) 40 mg tablet Take 1 tablet (40 mg total) by mouth daily 90 tablet 1    clopidogrel (PLAVIX) 75 mg tablet Take 1 tablet (75 mg total) by mouth daily 90 tablet 1    docusate sodium (COLACE) 100 mg capsule Take 1 capsule (100 mg total) by mouth 2 (two) times a day 10 capsule 0    Dulaglutide (Trulicity) 1 5 BY/1 5RQ SOPN Inject 0 5 mL (1 5 mg total) under the skin once a week 6 mL 1    ferrous sulfate 325 (65 Fe) mg tablet Take 1 tablet (325 mg total) by mouth every other day 45 tablet 1    gabapentin (NEURONTIN) 800 mg tablet Take 1 tablet (800 mg total) by mouth 4 (four) times a day 120 tablet 2    HYDROcodone-acetaminophen (NORCO) 5-325 mg per tablet Take 1 tablet by mouth 3 (three) times a day as needed for pain For ongoing pain Max Daily Amount: 3 tablets 90 tablet 0    insulin glargine (Lantus SoloStar) 100 units/mL injection pen Inject 50 Units under the skin every 12 (twelve) hours 30 mL 2    insulin lispro (HumaLOG) 100 units/mL injection pen Inject 20 Units under the skin 3 (three) times a day with meals 20 mL 2    Insulin Pen Needle (BD Pen Needle Micro U/F) 32G X 6 MM MISC Use 5 (five) times a day 200 each 5    Insulin Syringe-Needle U-100 (BD Veo Insulin Syringe U/F) 31G X 15/64" 0 5 ML MISC Inject under the skin 3 (three) times a day 100 each 2    isosorbide mononitrate (IMDUR) 60 mg 24 hr tablet TAKE 1 TABLET BY MOUTH EVERY DAY 90 tablet 2    Lancets (OneTouch Delica Plus [id number]) MISC USE TO TEST 3 TIMES DAILY 100 each 2    levothyroxine 50 mcg tablet TAKE 1 TABLET BY MOUTH EVERY DAY 60 tablet 0    naloxone (NARCAN) 4 mg/0 1 mL nasal spray Administer 1 spray into a nostril  If breathing does not return to normal or if breathing difficulty resumes after 2-3 minutes, give another dose in the other nostril using a new spray  1 each 1    nitroglycerin (NITROSTAT) 0 4 mg SL tablet Place 1 tablet (0 4 mg total) under the tongue every 5 (five) minutes as needed for chest pain 25 tablet 1    nystatin (MYCOSTATIN) powder Apply topically 2 (two) times a day 30 g 1    OLANZapine (ZyPREXA) 5 mg tablet Take 1 tablet (5 mg total) by mouth daily at bedtime 90 tablet 0    ondansetron (ZOFRAN) 4 mg tablet Take 1 tablet (4 mg total) by mouth every 8 (eight) hours as needed for nausea or vomiting 20 tablet 0    OneTouch Ultra test strip USE 1 EACH DAILY USE AS INSTRUCTED 100 strip 2    pantoprazole (PROTONIX) 40 mg tablet TAKE 1 TABLET BY MOUTH TWICE A  tablet 1    polyethylene glycol (GLYCOLAX) 17 GM/SCOOP powder Take 17 g by mouth daily 255 g 1    tiZANidine (ZANAFLEX) 4 mg tablet Take 1 tablet (4 mg total) by mouth every 8 (eight) hours as needed for muscle spasms 90 tablet 0    torsemide (DEMADEX) 20 mg tablet Take 2 tablets (40 mg total) by mouth daily 180 tablet 2     No current facility-administered medications for this visit          Allergies   Allergen Reactions    Codeine      Other reaction(s): Nausea and Vomiting    Latex Itching       Social History     Socioeconomic History    Marital status:      Spouse name: Not on file    Number of children: Not on file    Years of education: Not on file    Highest education level: Not on file   Occupational History    Not on file   Tobacco Use    Smoking status: Former Smoker     Packs/day: 1 00     Years: 35 00     Pack years: 35 00     Types: Cigarettes     Quit date: 2012     Years since quitting: 10 2    Smokeless tobacco: Never Used   Vaping Use    Vaping Use: Never used   Substance and Sexual Activity    Alcohol use: Not Currently    Drug use: Never    Sexual activity: Not Currently   Other Topics Concern    Not on file   Social History Narrative    Not on file     Social Determinants of Health     Financial Resource Strain: Low Risk     Difficulty of Paying Living Expenses: Not hard at all   Food Insecurity: No Food Insecurity    Worried About [address] in the Last Year: Never true    [address] Real-Time in the Last Year: Never true   Transportation Needs: No Transportation Needs    Lack of Transportation (Medical): No    Lack of Transportation (Non-Medical):  No   Physical Activity: Not on file   Stress: Not on file   Social Connections: Not on file   Intimate Partner Violence: Not on file   Housing Stability: Low Risk     Unable to Pay for Housing in the Last Year: No    Number of Places Lived in the Last Year: 1    Unstable Housing in the Last Year: No        Family History   Problem Relation Age of Onset    Stroke Father     Heart disease Father     No Known Problems Mother     No Known Problems Sister     No Known Problems Daughter     No Known Problems Maternal Grandmother     No Known Problems Maternal Grandfather     No Known Problems Paternal Grandmother     No Known Problems Paternal Grandfather     No Known Problems Maternal Aunt     No Known Problems Maternal Aunt     No Known Problems Maternal Aunt     No Known Problems Maternal Aunt     No Known Problems Maternal Aunt     No Known Problems Maternal Aunt     No Known Problems Paternal Aunt        Lab Results   Component Value Date    WBC 7 34 02/15/2022    HGB 8 8 (L) 02/15/2022    HCT 27 1 (L) 02/15/2022    MCV 97 02/15/2022     02/15/2022      Lab Results   Component Value Date    SODIUM 142 02/24/2022    K 4 8 02/24/2022     02/24/2022    CO2 28 02/24/2022    BUN 69 (H) 02/24/2022    CREATININE 3 47 (H) 02/24/2022    GLUC 217 (H) 02/16/2022    CALCIUM 8 5 02/24/2022      Lab Results   Component Value Date    HGBA1C 7 3 (A) 03/29/2022      No results found for: CHOL  Lab Results   Component Value Date    HDL 30 (L) 08/03/2021    HDL 45 12/18/2020     Lab Results   Component Value Date    LDLCALC 86 08/03/2021    LDLCALC 122 12/18/2020     Lab Results   Component Value Date    TRIG 180 (H) 08/03/2021    TRIG 162 (H) 12/18/2020     No results found for: [place], [place]   Lab Results   Component Value Date    INR 1 03 08/23/2021    INR 0 93 05/26/2021    PROTIME 13 6 08/23/2021    PROTIME 12 3 05/26/2021          Patient Active Problem List    Diagnosis Date Noted    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 [facility] failure [facility] 08/24/2021    Prolonged QT interval 08/24/2021    Urinary tract infection associated with cystostomy catheter ([facility] ) 08/23/2021    Neurogenic bladder 08/23/2021    Morbid obesity with BMI of 45 0-49 9, adult ([facility] ) 06/15/2021    History of heart artery stent 06/15/2021    Memory impairment 05/26/2021    Chronic bronchitis ([facility] ) 05/25/2021    Severe episode of recurrent major depressive disorder, without psychotic features ([facility] ) 05/25/2021    Skin ulcer of hand, limited to breakdown of skin ([facility] ) 02/25/2021    HTN (hypertension) 12/21/2020    Diabetic ulcer of toe of right foot associated with type 2 diabetes mellitus, with muscle involvement without evidence of necrosis ([facility] ) 11/25/2020    Head lump 11/11/2020    Anemia 09/09/2020    Abnormal LFTs 09/09/2020    S/P cervical spinal fusion 09/09/2020    Major depressive disorder 09/02/2020    Type 2 diabetes mellitus with stage 4 chronic kidney disease, with long-term current use of insulin ([facility] ) 09/02/2020    Anxiety 09/02/2020    CAD (coronary artery disease) 09/02/2020    Osteoarthritis of left knee 09/02/2020    Cellulitis of finger of right hand 08/26/2020    ALFRED (acute kidney injury) ([facility] ) 03/03/2022    Encounter for examination following treatment at hospital 02/22/2022    Other artificial openings of urinary tract status ([facility] ) 02/10/2022    Continuous opioid dependence ([facility] ) 02/10/2022    Adrenal nodule ([facility] ) 02/10/2022    Stable angina ([facility] ) 02/10/2022    Volume overload 02/10/2022    Acquired hypothyroidism 10/14/2021    Mixed hyperlipidemia 10/14/2021    Gastroesophageal reflux disease without esophagitis 10/13/2021    Other proteinuria 09/14/2021    Stage 4 chronic kidney disease ([facility] ) 06/04/2021       Portions of the record may have been created with voice recognition software  Occasional wrong word or "sound a like" substitutions may have occurred due to the inherent limitations of voice recognition software  Read the chart carefully and recognize, using context, where substitutions have occurred      Halley Martínez DO, [facility]  4/13/2022 5:05 PM

## 2022-04-15 ENCOUNTER — PATIENT OUTREACH (OUTPATIENT)
Dept: FAMILY MEDICINE CLINIC | Facility: CLINIC | Age: 62
End: 2022-04-15

## 2022-04-15 NOTE — PROGRESS NOTES
I called the patient to follow up  She states she relocated and was in the middle of unpacking so I kept the call brief  She stated she moved in yesterday (address change had already been made in the chart)  The patient reports her blood sugars have been all <200  She states she is checking her feet daily and denies any skin breakdown  The patient weighed herself yesterday but could not recall the reading  She denies any extremity edema but notes she did have slight edema yesterday because she was on her feet with the move  I asked her to take breaks and elevate her feet during the day  I informed the patient if she plans on eating ham for [place] to be mindful of the portion size as it is high in sodium and she agreed  The patient's daughter was in the background asking questions about the waiver program; I will send a note to [facility] as she has assisted the patient in the past       I reminded the patient of her CT appointments on Monday starting at 1230; she plans on attending  I will continue to follow

## 2022-04-25 DIAGNOSIS — Z98.1 S/P CERVICAL SPINAL FUSION: ICD-10-CM

## 2022-04-25 RX ORDER — TIZANIDINE 4 MG/1
4 TABLET ORAL EVERY 8 HOURS PRN
Qty: 90 TABLET | Refills: 0 | Status: SHIPPED | OUTPATIENT
Start: 2022-04-25 | End: 2022-05-27

## 2022-05-02 ENCOUNTER — PATIENT OUTREACH (OUTPATIENT)
Dept: FAMILY MEDICINE CLINIC | Facility: CLINIC | Age: 62
End: 2022-05-02

## 2022-05-02 DIAGNOSIS — L97.509 TYPE 2 DIABETES MELLITUS WITH FOOT ULCER, WITH LONG-TERM CURRENT USE OF INSULIN (HCC): ICD-10-CM

## 2022-05-02 DIAGNOSIS — Z98.1 S/P CERVICAL SPINAL FUSION: ICD-10-CM

## 2022-05-02 DIAGNOSIS — Z79.4 TYPE 2 DIABETES MELLITUS WITH FOOT ULCER, WITH LONG-TERM CURRENT USE OF INSULIN (HCC): ICD-10-CM

## 2022-05-02 DIAGNOSIS — M17.12 PRIMARY OSTEOARTHRITIS OF LEFT KNEE: ICD-10-CM

## 2022-05-02 DIAGNOSIS — E11.621 TYPE 2 DIABETES MELLITUS WITH FOOT ULCER, WITH LONG-TERM CURRENT USE OF INSULIN (HCC): ICD-10-CM

## 2022-05-02 NOTE — PROGRESS NOTES
I called the patient to follow up  She states she is doing well  She reports a fasting blood sugar today of 191  Regarding her diet, she states she has "good days and bad days"  The patient notes some days she does not have much of an appetite  The patient has not been checking her daily weights for weeks  She recently moved and things are in disarray  I strongly encouraged her to start checking her weight daily and she agreed  She denies chest pain, shortness of breath or edema  She does not at times her left ankle becomes "puffy" but improves with elevation  She states she is watching her sodium intake  The patient cancelled her 2 CT's  I provided Central Scheduling's number for her to reschedule these  The patient denies any issues with her catheter except for occasional "crimping" of the tube  The patient is in need of medication refills which I will submit to the PCP  I will continue to follow

## 2022-05-03 RX ORDER — INSULIN GLARGINE 100 [IU]/ML
50 INJECTION, SOLUTION SUBCUTANEOUS EVERY 12 HOURS SCHEDULED
Qty: 30 ML | Refills: 3 | Status: ON HOLD | OUTPATIENT
Start: 2022-05-03 | End: 2022-07-19 | Stop reason: SDUPTHER

## 2022-05-03 RX ORDER — HYDROCODONE BITARTRATE AND ACETAMINOPHEN 5; 325 MG/1; MG/1
1 TABLET ORAL 3 TIMES DAILY PRN
Qty: 90 TABLET | Refills: 0 | Status: SHIPPED | OUTPATIENT
Start: 2022-05-03 | End: 2022-08-01

## 2022-05-17 ENCOUNTER — PATIENT OUTREACH (OUTPATIENT)
Dept: FAMILY MEDICINE CLINIC | Facility: CLINIC | Age: 62
End: 2022-05-17

## 2022-05-17 NOTE — PROGRESS NOTES
I called the patient who states she has been nauseated for 2 days  She stated "I am not interested in eating"  She has been able to take small sips of fluid  The patient did not check her blood sugar today  I asked if her daughter was there to help her and she stated she will be leaving soon for her night job  I asked that before her daughter leaves to check her blood sugar and she stated she will ask her  The patient denies any extremity edema, chest pain or shortness of breath  She notes her breathing is "ok"  I encouraged her to go to the ED as she sounded weak but she declined  I strongly advised her to call the ambulance if she worsens during the night when her daughter is not home and she agreed  I will follow up tomorrow

## 2022-05-17 NOTE — NUR
QUIET HOURS. PT LYING IN BED EYES CLOSED RESTING QUIETLY. RR EVEN AND
UNLABROED. CL IN REACH normal...

## 2022-05-18 ENCOUNTER — PATIENT OUTREACH (OUTPATIENT)
Dept: FAMILY MEDICINE CLINIC | Facility: CLINIC | Age: 62
End: 2022-05-18

## 2022-05-18 ENCOUNTER — TELEPHONE (OUTPATIENT)
Dept: NEPHROLOGY | Facility: CLINIC | Age: 62
End: 2022-05-18

## 2022-05-18 NOTE — PROGRESS NOTES
I called the patient to follow up  She states she continues with "a little" nausea today but notes it has improved from yesterday  She notes she is still able to eat and drink  The patient states the nurse was at her home this morning to change her catheter tubing  Reportedly the patient's blood pressure was taken but the patient does not recall what the reading was  The patient has not checked her blood sugar in at least 2 days  I asked that either she or her daughter check her level today and call me with the reading; she agreed  The patient denies any changes or new symptoms since yesterday  I informed the patient she missed her Bariatrics appointment that was at 1pm today; she states she will call to reschedule  I await her call with a blood sugar reading

## 2022-05-23 ENCOUNTER — PATIENT OUTREACH (OUTPATIENT)
Dept: FAMILY MEDICINE CLINIC | Facility: CLINIC | Age: 62
End: 2022-05-23

## 2022-05-23 RX ORDER — SODIUM CHLORIDE 9 MG/ML
100 INJECTION, SOLUTION INTRAVENOUS CONTINUOUS
Status: CANCELLED | OUTPATIENT
Start: 2022-05-23

## 2022-05-23 NOTE — PROGRESS NOTES
I called the patient but received voicemail  Message was left reminding her of her procedures for tomorrow and to be sure she had items needed for her prep  I stated if she has any questions regarding the prep, to contact the GI office  I will continue further outreach

## 2022-05-24 ENCOUNTER — TELEPHONE (OUTPATIENT)
Dept: NEPHROLOGY | Facility: CLINIC | Age: 62
End: 2022-05-24

## 2022-05-24 ENCOUNTER — PATIENT OUTREACH (OUTPATIENT)
Dept: FAMILY MEDICINE CLINIC | Facility: CLINIC | Age: 62
End: 2022-05-24

## 2022-05-24 NOTE — TELEPHONE ENCOUNTER
Appointment Confirmation   Person confirmed appointment with  If not patient, name of the person Patient     Date and time of appointment 05/25 12   Patient acknowledged and will be at appointment?  yes   Did you advise the patient that they will need a urine sample if they are a new patient? no   Did you advise the patient to bring their current medications for verification? (including any OTC) Yes    Additional Information

## 2022-05-24 NOTE — PROGRESS NOTES
I called the patient to follow up as she no-showed for her GI procedures this morning as well as her mammogram appointment yesterday  The patient reports continued nausea, headache and "very lethargic"  I stressed to the patient multiple times to go to the ED but she declined  Her daughter Nicole Chong was not home at the time as I wanted to speak with her  I urged the patient to call the ambulance but she stated "not today" and that she wanted to take a shower  The patient noted she did not check her blood sugar today and could not recall what yesterday's reading was  She stated she took her insulin and has been eating crackers  She denies vomiting or other symptoms  (Of note, the patient had similar symptoms last week)  I again stressed for her to go to the ED and she asked if she could get an appointment at the office tomorrow instead  I advised against this and also told her she has a Neph appointment tomorrow which she forgot about even though their office spoke with the patient this morning  I will continue to follow

## 2022-05-27 DIAGNOSIS — Z98.1 S/P CERVICAL SPINAL FUSION: ICD-10-CM

## 2022-05-27 RX ORDER — TIZANIDINE 4 MG/1
4 TABLET ORAL EVERY 8 HOURS PRN
Qty: 270 TABLET | Refills: 1 | Status: SHIPPED | OUTPATIENT
Start: 2022-05-27

## 2022-05-31 ENCOUNTER — TELEPHONE (OUTPATIENT)
Dept: NEPHROLOGY | Facility: CLINIC | Age: 62
End: 2022-05-31

## 2022-05-31 ENCOUNTER — PATIENT OUTREACH (OUTPATIENT)
Dept: FAMILY MEDICINE CLINIC | Facility: CLINIC | Age: 62
End: 2022-05-31

## 2022-05-31 NOTE — TELEPHONE ENCOUNTER
Patient no showed 5/25 appt  Spoke with Patient and schedule appointment for 9/6 with Dr Jerrod Cueva in the [facility]

## 2022-05-31 NOTE — PROGRESS NOTES
I called the patient but received voicemail  Message was left stating I will call back at another time

## 2022-06-06 ENCOUNTER — PATIENT OUTREACH (OUTPATIENT)
Dept: FAMILY MEDICINE CLINIC | Facility: CLINIC | Age: 62
End: 2022-06-06

## 2022-06-06 NOTE — PROGRESS NOTES
I received a call from the patient asking for an appointment  She reports continued nausea for nearly 3 weeks  She denies vomiting and states she "fights it off"  I asked her to go to the ED but she declined and prefers an appointment for tomorrow; note sent to clerical     The patient also reports a "sore" on the tip of her middle finger  She notes the nail is "crushed" but denies any trauma or injury to the area  The patient reports her blood sugar this morning of 190  She notes she has been able to eat and drink with the nausea  I advised the patient to call the ambulance if she worsens and she agreed

## 2022-06-10 ENCOUNTER — HOME HEALTH ADMISSION (OUTPATIENT)
Dept: HOME HEALTH SERVICES | Facility: HOME HEALTHCARE | Age: 62
End: 2022-06-10

## 2022-06-10 ENCOUNTER — TRANSCRIBE ORDERS (OUTPATIENT)
Dept: HOME HEALTH SERVICES | Facility: HOME HEALTHCARE | Age: 62
End: 2022-06-10

## 2022-06-10 DIAGNOSIS — Z43.5 ENCOUNTER FOR ATTENTION TO CYSTOSTOMY (HCC): Primary | ICD-10-CM

## 2022-06-16 ENCOUNTER — PATIENT OUTREACH (OUTPATIENT)
Dept: FAMILY MEDICINE CLINIC | Facility: CLINIC | Age: 62
End: 2022-06-16

## 2022-06-16 ENCOUNTER — HOME CARE VISIT (OUTPATIENT)
Dept: HOME HEALTH SERVICES | Facility: HOME HEALTHCARE | Age: 62
End: 2022-06-16

## 2022-06-16 NOTE — PROGRESS NOTES
I called the patient to follow up  She states she is "not too good"  The patient reports continued nausea (of note: the patient has been nauseated since 5/15 and on my 5/2 call she reported decreased appetite)  I urged the patient to go to the ED but she declined  She states she continues to eat and drink and has been taking all her medications  The patient noted [facility] was at her home today to change her catheter  I asked if any vitals were taken and she stated her blood pressure was "a little elevated"  The patient also complains of a headache that started today  The patient reported her blood sugar today was in the 100's but could not recall the exact reading  She notes the ulcer on her finger is "a lot better" and states there are "no signs of infection"  The patient notes the nail is "broke up" and she is unsure how this happened  I again stressed to the patient to go to the ED; she refused but agreed to schedule an appointment when her daughter is not working; note sent to clerical to schedule  I will continue to follow

## 2022-06-17 ENCOUNTER — HOME CARE VISIT (OUTPATIENT)
Dept: HOME HEALTH SERVICES | Facility: HOME HEALTHCARE | Age: 62
End: 2022-06-17

## 2022-06-17 ENCOUNTER — PATIENT OUTREACH (OUTPATIENT)
Dept: FAMILY MEDICINE CLINIC | Facility: CLINIC | Age: 62
End: 2022-06-17

## 2022-06-17 PROCEDURE — 400014 VN F/U

## 2022-06-17 NOTE — PROGRESS NOTES
I called the patient's daughter/care taker, Yenny, to follow up  She states the patient continues with nausea  She has been trying to urge the patient to go to the ED but the patient refuses  Yenny reports the patient also has diarrhea but denies the patient seeing any blood  Usama Carnes denies the patient having any vomiting, chest pain or shortness of breath  She notes the patient has only slight extremity edema  Usama Carnes states the patient has not been checking daily weights and has not been consistent in checking her blood sugars  She notes the last blood sugar she recalls was in the 100's  I stressed to Usama Carnes to take the patient to the ED or call the ambulance if she worsens or develops new symptoms and she agreed  I will continue to follow

## 2022-06-19 ENCOUNTER — HOME CARE VISIT (OUTPATIENT)
Dept: HOME HEALTH SERVICES | Facility: HOME HEALTHCARE | Age: 62
End: 2022-06-19

## 2022-06-19 ENCOUNTER — HOSPITAL ENCOUNTER (EMERGENCY)
Facility: HOSPITAL | Age: 62
Discharge: HOME | End: 2022-06-19
Attending: EMERGENCY MEDICINE
Payer: COMMERCIAL

## 2022-06-19 VITALS
HEART RATE: 80 BPM | DIASTOLIC BLOOD PRESSURE: 70 MMHG | SYSTOLIC BLOOD PRESSURE: 158 MMHG | TEMPERATURE: 98.6 F | RESPIRATION RATE: 18 BRPM | OXYGEN SATURATION: 95 %

## 2022-06-19 VITALS
DIASTOLIC BLOOD PRESSURE: 89 MMHG | WEIGHT: 282 LBS | OXYGEN SATURATION: 98 % | HEART RATE: 85 BPM | TEMPERATURE: 97.9 F | RESPIRATION RATE: 20 BRPM | BODY MASS INDEX: 42.88 KG/M2 | SYSTOLIC BLOOD PRESSURE: 181 MMHG

## 2022-06-19 DIAGNOSIS — R19.7 NAUSEA VOMITING AND DIARRHEA: Primary | ICD-10-CM

## 2022-06-19 DIAGNOSIS — N28.9 RENAL INSUFFICIENCY: ICD-10-CM

## 2022-06-19 DIAGNOSIS — R11.2 NAUSEA VOMITING AND DIARRHEA: Primary | ICD-10-CM

## 2022-06-19 DIAGNOSIS — R51.9 HEADACHE: ICD-10-CM

## 2022-06-19 LAB
ALBUMIN SERPL BCP-MCNC: 3.9 G/DL (ref 3–5.2)
ALP SERPL-CCNC: 93 U/L (ref 43–122)
ALT SERPL W P-5'-P-CCNC: 21 U/L
ANION GAP SERPL CALCULATED.3IONS-SCNC: 8 MMOL/L (ref 5–14)
AST SERPL W P-5'-P-CCNC: 52 U/L (ref 14–36)
ATRIAL RATE: 78 BPM
BACTERIA UR QL AUTO: ABNORMAL /HPF
BASOPHILS # BLD AUTO: 0.04 THOUSANDS/ΜL (ref 0–0.1)
BASOPHILS NFR BLD AUTO: 0 % (ref 0–1)
BILIRUB SERPL-MCNC: 0.84 MG/DL
BILIRUB UR QL STRIP: NEGATIVE
BUN SERPL-MCNC: 44 MG/DL (ref 5–25)
CALCIUM SERPL-MCNC: 8.7 MG/DL (ref 8.4–10.2)
CHLORIDE SERPL-SCNC: 109 MMOL/L (ref 97–108)
CLARITY UR: ABNORMAL
CO2 SERPL-SCNC: 22 MMOL/L (ref 22–30)
COLOR UR: YELLOW
CREAT SERPL-MCNC: 2.14 MG/DL (ref 0.6–1.2)
EOSINOPHIL # BLD AUTO: 0.19 THOUSAND/ΜL (ref 0–0.61)
EOSINOPHIL NFR BLD AUTO: 2 % (ref 0–6)
ERYTHROCYTE [DISTWIDTH] IN BLOOD BY AUTOMATED COUNT: 15.1 % (ref 11.6–15.1)
GFR SERPL CREATININE-BSD FRML MDRD: 24 ML/MIN/1.73SQ M
GLUCOSE SERPL-MCNC: 143 MG/DL (ref 70–99)
GLUCOSE UR STRIP-MCNC: ABNORMAL MG/DL
HCT VFR BLD AUTO: 30.5 % (ref 34.8–46.1)
HGB BLD-MCNC: 9.7 G/DL (ref 11.5–15.4)
HGB UR QL STRIP.AUTO: 150
HYALINE CASTS #/AREA URNS LPF: ABNORMAL /LPF
IMM GRANULOCYTES # BLD AUTO: 0.06 THOUSAND/UL (ref 0–0.2)
IMM GRANULOCYTES NFR BLD AUTO: 1 % (ref 0–2)
KETONES UR STRIP-MCNC: NEGATIVE MG/DL
LEUKOCYTE ESTERASE UR QL STRIP: 100
LYMPHOCYTES # BLD AUTO: 1.92 THOUSANDS/ΜL (ref 0.6–4.47)
LYMPHOCYTES NFR BLD AUTO: 17 % (ref 14–44)
MAGNESIUM SERPL-MCNC: 2.5 MG/DL (ref 1.6–2.3)
MCH RBC QN AUTO: 31.4 PG (ref 26.8–34.3)
MCHC RBC AUTO-ENTMCNC: 31.8 G/DL (ref 31.4–37.4)
MCV RBC AUTO: 99 FL (ref 82–98)
MONOCYTES # BLD AUTO: 0.94 THOUSAND/ΜL (ref 0.17–1.22)
MONOCYTES NFR BLD AUTO: 9 % (ref 4–12)
MUCOUS THREADS UR QL AUTO: ABNORMAL
NEUTROPHILS # BLD AUTO: 7.89 THOUSANDS/ΜL (ref 1.85–7.62)
NEUTS SEG NFR BLD AUTO: 71 % (ref 43–75)
NITRITE UR QL STRIP: NEGATIVE
NON-SQ EPI CELLS URNS QL MICRO: ABNORMAL /HPF
NRBC BLD AUTO-RTO: 0 /100 WBCS
P AXIS: 51 DEGREES
PH UR STRIP.AUTO: 7 [PH]
PLATELET # BLD AUTO: 216 THOUSANDS/UL (ref 149–390)
PMV BLD AUTO: 9.7 FL (ref 8.9–12.7)
POTASSIUM SERPL-SCNC: 4.9 MMOL/L (ref 3.6–5)
PR INTERVAL: 156 MS
PROT SERPL-MCNC: 7.3 G/DL (ref 5.9–8.4)
PROT UR STRIP-MCNC: >=500 MG/DL
QRS AXIS: 126 DEGREES
QRSD INTERVAL: 146 MS
QT INTERVAL: 426 MS
QTC INTERVAL: 485 MS
RBC # BLD AUTO: 3.09 MILLION/UL (ref 3.81–5.12)
RBC #/AREA URNS AUTO: ABNORMAL /HPF
SODIUM SERPL-SCNC: 139 MMOL/L (ref 137–147)
SP GR UR STRIP.AUTO: 1.01 (ref 1–1.04)
T WAVE AXIS: -34 DEGREES
UROBILINOGEN UA: NEGATIVE MG/DL
VENTRICULAR RATE: 78 BPM
WBC # BLD AUTO: 11.04 THOUSAND/UL (ref 4.31–10.16)
WBC #/AREA URNS AUTO: ABNORMAL /HPF

## 2022-06-19 PROCEDURE — 96374 THER/PROPH/DIAG INJ IV PUSH: CPT

## 2022-06-19 PROCEDURE — 36415 COLL VENOUS BLD VENIPUNCTURE: CPT | Performed by: EMERGENCY MEDICINE

## 2022-06-19 PROCEDURE — 99284 EMERGENCY DEPT VISIT MOD MDM: CPT

## 2022-06-19 PROCEDURE — 83735 ASSAY OF MAGNESIUM: CPT | Performed by: EMERGENCY MEDICINE

## 2022-06-19 PROCEDURE — 81001 URINALYSIS AUTO W/SCOPE: CPT | Performed by: EMERGENCY MEDICINE

## 2022-06-19 PROCEDURE — 99285 EMERGENCY DEPT VISIT HI MDM: CPT | Performed by: EMERGENCY MEDICINE

## 2022-06-19 PROCEDURE — 93010 ELECTROCARDIOGRAM REPORT: CPT | Performed by: INTERNAL MEDICINE

## 2022-06-19 PROCEDURE — 87636 SARSCOV2 & INF A&B AMP PRB: CPT | Performed by: EMERGENCY MEDICINE

## 2022-06-19 PROCEDURE — 80053 COMPREHEN METABOLIC PANEL: CPT | Performed by: EMERGENCY MEDICINE

## 2022-06-19 PROCEDURE — 96361 HYDRATE IV INFUSION ADD-ON: CPT

## 2022-06-19 PROCEDURE — 93005 ELECTROCARDIOGRAM TRACING: CPT

## 2022-06-19 PROCEDURE — 96375 TX/PRO/DX INJ NEW DRUG ADDON: CPT

## 2022-06-19 PROCEDURE — 85025 COMPLETE CBC W/AUTO DIFF WBC: CPT | Performed by: EMERGENCY MEDICINE

## 2022-06-19 RX ORDER — LIDOCAINE 50 MG/G
1 PATCH TOPICAL ONCE
Status: DISCONTINUED | OUTPATIENT
Start: 2022-06-19 | End: 2022-06-19 | Stop reason: HOSPADM

## 2022-06-19 RX ORDER — ONDANSETRON 4 MG/1
4 TABLET, ORALLY DISINTEGRATING ORAL EVERY 8 HOURS PRN
Qty: 20 TABLET | Refills: 0 | Status: SHIPPED | OUTPATIENT
Start: 2022-06-19

## 2022-06-19 RX ORDER — DIPHENHYDRAMINE HYDROCHLORIDE 50 MG/ML
25 INJECTION INTRAMUSCULAR; INTRAVENOUS ONCE
Status: COMPLETED | OUTPATIENT
Start: 2022-06-19 | End: 2022-06-19

## 2022-06-19 RX ORDER — LOPERAMIDE HYDROCHLORIDE 2 MG/1
2 CAPSULE ORAL 4 TIMES DAILY PRN
Qty: 12 CAPSULE | Refills: 0 | Status: SHIPPED | OUTPATIENT
Start: 2022-06-19 | End: 2022-08-01

## 2022-06-19 RX ORDER — ACETAMINOPHEN 325 MG/1
650 TABLET ORAL ONCE
Status: COMPLETED | OUTPATIENT
Start: 2022-06-19 | End: 2022-06-19

## 2022-06-19 RX ORDER — METOCLOPRAMIDE HYDROCHLORIDE 5 MG/ML
10 INJECTION INTRAMUSCULAR; INTRAVENOUS ONCE
Status: COMPLETED | OUTPATIENT
Start: 2022-06-19 | End: 2022-06-19

## 2022-06-19 RX ADMIN — DIPHENHYDRAMINE HYDROCHLORIDE 25 MG: 50 INJECTION, SOLUTION INTRAMUSCULAR; INTRAVENOUS at 03:22

## 2022-06-19 RX ADMIN — LIDOCAINE 1 PATCH: 50 PATCH TOPICAL at 03:51

## 2022-06-19 RX ADMIN — METOCLOPRAMIDE 10 MG: 5 INJECTION, SOLUTION INTRAMUSCULAR; INTRAVENOUS at 03:22

## 2022-06-19 RX ADMIN — ACETAMINOPHEN 650 MG: 325 TABLET ORAL at 03:22

## 2022-06-19 RX ADMIN — SODIUM CHLORIDE 1000 ML: 0.9 INJECTION, SOLUTION INTRAVENOUS at 03:17

## 2022-06-19 NOTE — CASE COMMUNICATION
Ship to    Home   Insurance  [facility]    [employer] Supp[employer]  16fr 5cc cath [id number]      1  10cc cath tray [id number]     2  Drain bag 2000cc [id number]   2  Leg bag large [id number]        2

## 2022-06-19 NOTE — CASE COMMUNICATION
Home Health need continues for: chronic suprapubic catheter mainatnance  Primary diagnoses/co-morbidities/recent procedures in past 60 days that impact current episode: neurogenic bladder requiring chronic suprapubic  catheter   Current level of functional ability: moderate assist with ADLs and IADLs  Homebound status and living arrangements: pt is home bound due to ambulation dysfunctionrisks for falls and chronic pain  Goals accomplis hed and/or measurable progress toward unmet goals in past 60 days: ongoing services for suprqapubic catheter  Focus of care for next 60 days for each discipline ordered: sn services for ongoing suprapubic catheter  Skin integrity/wound status: fair  pt is at risk sn to continue monotring and instructions  Code status: full  Most recent fall risk: at risk  Plan of Care confirmed with representative for dr Jarrett Banuelos  on 6 16 22

## 2022-06-19 NOTE — ED PROVIDER NOTES
History  Chief Complaint   Patient presents with    Diarrhea     Pt c/o diarrhea for 1 week, headache for 3 days, and nausea  Denies fevers/vomiting  51-year-old female presents with complaint of diarrhea for the past week or so  She also has had nausea and decreased p o  Intake  Over the past three days she has had fluctuating headache  She complains of light sensitivity  She also has had abdominal cramping which improves temporarily after having a bowel movement and when taking Mylanta  She has had no fevers or chills or other acute issues  She is unaware of any sick contacts  She reports that she did not take any of her blood pressure medications last night  Diarrhea  Quality:  Watery  Severity:  Moderate  Onset quality:  Gradual  Timing:  Intermittent  Progression:  Unchanged  Relieved by:  Nothing  Worsened by:  Nothing  Ineffective treatments:  None tried  Associated symptoms: abdominal pain (cramping) and headaches    Associated symptoms: no chills, no recent cough, no fever, no myalgias, no URI and no vomiting    Abdominal pain:     Location:  Generalized    Quality: cramping      Severity:  Mild    Onset quality:  Gradual    Timing:  Intermittent    Progression:  Waxing and waning    Chronicity:  New  Headaches:     Severity:  Moderate    Onset quality:  Gradual    Duration:  3 days    Timing:  Constant    Progression:  Waxing and waning    Chronicity:  New  Risk factors: no sick contacts    Headache  Associated symptoms: abdominal pain (cramping), diarrhea and nausea    Associated symptoms: no back pain, no dizziness, no drainage, no fatigue, no fever, no myalgias, no numbness, no URI and no vomiting        Prior to Admission Medications   Prescriptions Last Dose Informant Patient Reported? Taking?    Dulaglutide (Trulicity) 1 5 EB/8 6XF SOPN   No No   Sig: Inject 0 5 mL (1 5 mg total) under the skin once a week   HYDROcodone-acetaminophen (NORCO) 5-325 mg per tablet   No No   Sig: Take 1 tablet by mouth 3 (three) times a day as needed for pain For ongoing pain Max Daily Amount: 3 tablets   Insulin Pen Needle (BD Pen Needle Micro U/F) 32G X 6 MM MISC   No No   Sig: Use 5 (five) times a day   Insulin Syringe-Needle U-100 (BD Veo Insulin Syringe U/F) 31G X 15/64" 0 5 ML MISC   No No   Sig: Inject under the skin 3 (three) times a day   Lancets (OneTouch Delica Plus [id number]) MISC  Self No No   Sig: USE TO TEST 3 TIMES DAILY   OLANZapine (ZyPREXA) 5 mg tablet   No No   Sig: Take 1 tablet (5 mg total) by mouth daily at bedtime   OneTouch Ultra test strip  Self No No   Sig: USE 1 EACH DAILY USE AS INSTRUCTED   allopurinol (ZYLOPRIM) 300 mg tablet   No No   Sig: Take 1 tablet (300 mg total) by mouth daily   amLODIPine (NORVASC) 5 mg tablet   No No   Sig: Take 1 tablet (5 mg total) by mouth daily   aspirin (ECOTRIN LOW STRENGTH) 81 mg EC tablet   No No   Sig: Take 1 tablet (81 mg total) by mouth daily   atorvastatin (LIPITOR) 40 mg tablet   No No   Sig: Take 1 tablet (40 mg total) by mouth daily   bisoprolol (ZEBETA) 5 mg tablet   No No   Sig: Take 1 tablet (5 mg total) by mouth daily   citalopram (CeleXA) 40 mg tablet   No No   Sig: Take 1 tablet (40 mg total) by mouth daily   clopidogrel (PLAVIX) 75 mg tablet   No No   Sig: Take 1 tablet (75 mg total) by mouth daily   docusate sodium (COLACE) 100 mg capsule   No No   Sig: Take 1 capsule (100 mg total) by mouth 2 (two) times a day   ferrous sulfate 325 (65 Fe) mg tablet   No No   Sig: Take 1 tablet (325 mg total) by mouth every other day   gabapentin (NEURONTIN) 800 mg tablet   No No   Sig: Take 1 tablet (800 mg total) by mouth 4 (four) times a day   insulin glargine (Lantus SoloStar) 100 units/mL injection pen   No No   Sig: Inject 50 Units under the skin every 12 (twelve) hours   insulin lispro (HumaLOG) 100 units/mL injection pen   No No   Sig: Inject 20 Units under the skin 3 (three) times a day with meals   isosorbide mononitrate (IMDUR) 60 mg 24 hr tablet   No No   Sig: TAKE 1 TABLET BY MOUTH EVERY DAY   levothyroxine 50 mcg tablet  Self No No   Sig: TAKE 1 TABLET BY MOUTH EVERY DAY   naloxone (NARCAN) 4 mg/0 1 mL nasal spray  Self No No   Sig: Administer 1 spray into a nostril  If breathing does not return to normal or if breathing difficulty resumes after 2-3 minutes, give another dose in the other nostril using a new spray     nitroglycerin (NITROSTAT) 0 4 mg SL tablet  Self No No   Sig: Place 1 tablet (0 4 mg total) under the tongue every 5 (five) minutes as needed for chest pain   nystatin (MYCOSTATIN) powder  Self No No   Sig: Apply topically 2 (two) times a day   ondansetron (ZOFRAN) 4 mg tablet  Self No No   Sig: Take 1 tablet (4 mg total) by mouth every 8 (eight) hours as needed for nausea or vomiting   pantoprazole (PROTONIX) 40 mg tablet  Self No No   Sig: TAKE 1 TABLET BY MOUTH TWICE A DAY   polyethylene glycol (GLYCOLAX) 17 GM/SCOOP powder   No No   Sig: Take 17 g by mouth daily   tiZANidine (ZANAFLEX) 4 mg tablet   No No   Sig: TAKE 1 TABLET (4 MG TOTAL) BY MOUTH EVERY 8 (EIGHT) HOURS AS NEEDED FOR MUSCLE SPASMS   torsemide (DEMADEX) 20 mg tablet   No No   Sig: Take 2 tablets (40 mg total) by mouth daily      Facility-Administered Medications: None       Past Medical History:   Diagnosis Date    Abnormal liver function     Anemia     Anxiety     Arthritis     Chronic kidney disease     stage 3    Chronic narcotic dependence (HCC)     Chronic pain disorder     lower back, hands , neck and knees    Coronary artery disease     Depression     Diabetes mellitus (HCC)     Elevated troponin 2/11/2022    GERD (gastroesophageal reflux disease)     no meds at present    Heart murmur     murmur    Hyperlipidemia     Hypertension     Neurogenic bladder     Open toe wound 12/2020    right big toe open calus but is dry at present    Renal disorder     Shortness of breath     exertion    Sleep apnea     doesn't use cpap    Suprapubic catheter [facility])        Past Surgical History:   Procedure Laterality Date    AMPUTATION      ANGIOPLASTY  2017    5    BREAST EXCISIONAL BIOPSY Left     BREAST SURGERY      CARDIAC CATHETERIZATION      CARPAL TUNNEL RELEASE Bilateral     CERVICAL FUSION      HYSTERECTOMY  2008    IR SUPRAPUBIC CATHETER PLACEMENT  6/15/2021    KNEE SURGERY      OOPHORECTOMY  2008    MS EXC SKIN BENIG 3 1-4 CM REMAINDR BODY N/A 12/21/2020    Procedure: EXCISION SEBACEOUS CYST X 5 SCALP;  Surgeon: Sarahi Kearney MD;  Location:  MAIN OR;  Service: General    TOE AMPUTATION Left     TRIGGER FINGER RELEASE Right     4th Finger       Family History   Problem Relation Age of Onset    Stroke Father     Heart disease Father     No Known Problems Mother     No Known Problems Sister     No Known Problems Daughter     No Known Problems Maternal Grandmother     No Known Problems Maternal Grandfather     No Known Problems Paternal Grandmother     No Known Problems Paternal Grandfather     No Known Problems Maternal Aunt     No Known Problems Maternal Aunt     No Known Problems Maternal Aunt     No Known Problems Maternal Aunt     No Known Problems Maternal Aunt     No Known Problems Maternal Aunt     No Known Problems Paternal Aunt      I have reviewed and agree with the history as documented  E-Cigarette/Vaping    E-Cigarette Use Never User      E-Cigarette/Vaping Substances    Nicotine No     THC No     CBD No     Flavoring No     Other No     Unknown No      Social History     Tobacco Use    Smoking status: Former Smoker     Packs/day: 1 00     Years: 35 00     Pack years: 35 00     Types: Cigarettes     Quit date: 2012     Years since quitting: 10 4    Smokeless tobacco: Never Used   Vaping Use    Vaping Use: Never used   Substance Use Topics    Alcohol use: Not Currently    Drug use: Never       Review of Systems   Constitutional: Positive for activity change and appetite change   Negative for chills, fatigue and fever  HENT: Negative for postnasal drip, sinus pain and trouble swallowing  Eyes: Negative for redness and itching  Respiratory: Negative for chest tightness, shortness of breath and wheezing  Cardiovascular: Negative for leg swelling  Gastrointestinal: Positive for abdominal pain (cramping), diarrhea and nausea  Negative for abdominal distention, constipation and vomiting  Endocrine: Negative  Genitourinary: Negative for difficulty urinating and dysuria  Musculoskeletal: Negative for back pain and myalgias  Skin: Negative for rash  Allergic/Immunologic: Negative  Neurological: Positive for headaches  Negative for dizziness and numbness  Hematological: Negative  Psychiatric/Behavioral: Negative  All other systems reviewed and are negative  Physical Exam  Physical Exam  Vitals and nursing note reviewed  Constitutional:       General: She is not in acute distress  Appearance: Normal appearance  She is well-developed  She is obese  She is not ill-appearing or toxic-appearing  HENT:      Head: Normocephalic and atraumatic  Right Ear: External ear normal       Left Ear: External ear normal       Nose: Nose normal  No congestion  Mouth/Throat:      Mouth: Mucous membranes are moist       Pharynx: Oropharynx is clear  Eyes:      Conjunctiva/sclera: Conjunctivae normal       Pupils: Pupils are equal, round, and reactive to light  Cardiovascular:      Rate and Rhythm: Normal rate and regular rhythm  Heart sounds: Normal heart sounds  Pulmonary:      Effort: Pulmonary effort is normal  No respiratory distress  Breath sounds: Normal breath sounds  No wheezing  Abdominal:      General: Bowel sounds are normal       Palpations: Abdomen is soft  Tenderness: There is abdominal tenderness (diffuse)  There is no guarding     Genitourinary:     Comments: Camargo in place - draining clear urine    Musculoskeletal:         General: No tenderness or deformity  Cervical back: Normal range of motion and neck supple  No rigidity  Skin:     General: Skin is warm and dry  Capillary Refill: Capillary refill takes less than 2 seconds  Findings: No rash  Neurological:      General: No focal deficit present  Mental Status: She is alert and oriented to person, place, and time     Psychiatric:         Mood and Affect: Mood normal          Behavior: Behavior normal          Vital Signs  ED Triage Vitals   Temperature Pulse Respirations Blood Pressure SpO2   06/19/22 0315 06/19/22 0303 06/19/22 0303 06/19/22 0303 06/19/22 0303   97 9 °F (36 6 °C) 85 20 (!) 195/89 98 %      Temp src Heart Rate Source Patient Position - Orthostatic VS BP Location FiO2 (%)   -- -- -- -- --             Pain Score       --                  Vitals:    06/19/22 0303 06/19/22 0447   BP: (!) 195/89 (!) 181/89   Pulse: 85          Visual Acuity      ED Medications  Medications   lidocaine (LIDODERM) 5 % patch 1 patch (1 patch Topical Medication Applied 6/19/22 0351)   sodium chloride 0 9 % bolus 1,000 mL (1,000 mL Intravenous New Bag 6/19/22 0317)   metoclopramide (REGLAN) injection 10 mg (10 mg Intravenous Given 6/19/22 0322)   diphenhydrAMINE (BENADRYL) injection 25 mg (25 mg Intravenous Given 6/19/22 0322)   acetaminophen (TYLENOL) tablet 650 mg (650 mg Oral Given 6/19/22 0322)       Diagnostic Studies  Results Reviewed     Procedure Component Value Units Date/Time    Urine Microscopic [[id number]]  (Abnormal) Collected: 06/19/22 0352    Lab Status: Final result Specimen: Urine, Indwelling Camargo Catheter Updated: 06/19/22 0419     RBC, UA 20-30 /hpf      WBC, UA 20-30 /hpf      Epithelial Cells Moderate /hpf      Bacteria, UA Occasional /hpf      Hyaline Casts, UA 0-1 /lpf      MUCUS THREADS Occasional    UA (URINE) with reflex to Scope [[id number]]  (Abnormal) Collected: 06/19/22 0352    Lab Status: Final result Specimen: Urine, Indwelling Camargo Catheter Updated: 06/19/22 0405     Color, UA Yellow     Clarity, UA Slightly Cloudy     Specific [place], UA 1 010     pH, UA 7 0     Leukocytes,  0     Nitrite, UA Negative     Protein, UA >=500 mg/dl      Glucose,  (1/10%) mg/dl      Ketones, UA Negative mg/dl      Bilirubin, UA Negative     Blood,  0     UROBILINOGEN UA Negative mg/dL     Magnesium [[id number]]  (Abnormal) Collected: 06/19/22 0315    Lab Status: Final result Specimen: Blood from Arm, Right Updated: 06/19/22 0335     Magnesium 2 5 mg/dL     Narrative:      Hemolysis    Comprehensive metabolic panel [[id number]]  (Abnormal) Collected: 06/19/22 0315    Lab Status: Final result Specimen: Blood from Arm, Right Updated: 06/19/22 0335     Sodium 139 mmol/L      Potassium 4 9 mmol/L      Chloride 109 mmol/L      CO2 22 mmol/L      ANION GAP 8 mmol/L      BUN 44 mg/dL      Creatinine 2 14 mg/dL      Glucose 143 mg/dL      Calcium 8 7 mg/dL      AST 52 U/L      ALT 21 U/L      Alkaline Phosphatase 93 U/L      Total Protein 7 3 g/dL      Albumin 3 9 g/dL      Total Bilirubin 0 84 mg/dL      eGFR 24 ml/min/1 73sq m     Narrative:      Hemolysis  National Kid[employer] guidelines for Chronic Kidney Disease (CKD):     Stage 1 with normal or high GFR (GFR > 90 mL/min/1 73 square meters)    Stage 2 Mild CKD (GFR = 60-89 mL/min/1 73 square meters)    Stage 3A Moderate CKD (GFR = 45-59 mL/min/1 73 square meters)    Stage 3B Moderate CKD (GFR = 30-44 mL/min/1 73 square meters)    Stage 4 Severe CKD (GFR = 15-29 mL/min/1 73 square meters)    Stage 5 End Stage CKD (GFR <15 mL/min/1 73 square meters)  Note: GFR calculation is accurate only with a steady state creatinine    CBC and differential [[id number]]  (Abnormal) Collected: 06/19/22 0315    Lab Status: Final result Specimen: Blood from Arm, Right Updated: 06/19/22 0324     WBC 11 04 Thousand/uL      RBC 3 09 Million/uL      Hemoglobin 9 7 g/dL      Hematocrit 30 5 %      MCV 99 fL MCH 31 4 pg      MCHC 31 8 g/dL      RDW 15 1 %      MPV 9 7 fL      Platelets 187 Thousands/uL      nRBC 0 /100 WBCs      Neutrophils Relative 71 %      Immat GRANS % 1 %      Lymphocytes Relative 17 %      Monocytes Relative 9 %      Eosinophils Relative 2 %      Basophils Relative 0 %      Neutrophils Absolute 7 89 Thousands/µL      Immature Grans Absolute 0 06 Thousand/uL      Lymphocytes Absolute 1 92 Thousands/µL      Monocytes Absolute 0 94 Thousand/µL      Eosinophils Absolute 0 19 Thousand/µL      Basophils Absolute 0 04 Thousands/µL     COVID/FLU - 24 hour TAT [[id number]] Collected: 06/19/22 0315    Lab Status: In process Specimen: Nares from Nose Updated: 06/19/22 0322                 No orders to display              Procedures  ECG 12 Lead Documentation Only    Date/Time: 6/19/2022 3:36 AM  Performed by: Dipak Mccormack DO  Authorized by: Dipak Mccormack DO     ECG reviewed by me, the ED Provider: yes    Patient location:  ED  Rate:     ECG rate:  78    ECG rate assessment: normal    Rhythm:     Rhythm: sinus rhythm    Ectopy:     Ectopy: none    QRS:     QRS axis:  Right  Conduction:     Conduction: normal    ST segments:     ST segments:  Normal  T waves:     T waves: non-specific               ED Course  ED Course as of 06/19/22 0457   Sun Jun 19, 2022   0448 Symptoms improved  Will discharge home with outpatient follow-up                                               MDM    Disposition  Final diagnoses:   Nausea vomiting and diarrhea   Headache   Renal insufficiency     Time reflects when diagnosis was documented in both MDM as applicable and the Disposition within this note     Time User Action Codes Description Comment    6/19/2022  4:45 AM Sherl Pouch Add [R11 2,  R19 7] Nausea vomiting and diarrhea     6/19/2022  4:45 AM Sherl Pouch Add [R51 9] Headache     6/19/2022  4:45 AM Sherl Pouch Add [N28 9] Renal insufficiency       ED Disposition     ED Disposition   Discharge    Condition Stable    Date/Time   Sun Jun 19, 2022  4:45 AM    Comment   Gillian Nolan discharge to home/self care  Follow-up Information    None         Patient's Medications   Discharge Prescriptions    LOPERAMIDE (IMODIUM) 2 MG CAPSULE    Take 1 capsule (2 mg total) by mouth 4 (four) times a day as needed for diarrhea       Start Date: 6/19/2022 End Date: --       Order Dose: 2 mg       Quantity: 12 capsule    Refills: 0    ONDANSETRON (ZOFRAN-ODT) 4 MG DISINTEGRATING TABLET    Take 1 tablet (4 mg total) by mouth every 8 (eight) hours as needed for nausea or vomiting       Start Date: 6/19/2022 End Date: --       Order Dose: 4 mg       Quantity: 20 tablet    Refills: 0       No discharge procedures on file      PDMP Review       Value Time User    PDMP Reviewed  Yes 5/3/2022  9:30 AM CHERELLE Andrews          ED Provider  Electronically Signed by           Dipak Mccormack DO  06/19/22 5453

## 2022-06-20 ENCOUNTER — PATIENT OUTREACH (OUTPATIENT)
Dept: FAMILY MEDICINE CLINIC | Facility: CLINIC | Age: 62
End: 2022-06-20

## 2022-06-20 LAB
FLUAV RNA RESP QL NAA+PROBE: NEGATIVE
FLUBV RNA RESP QL NAA+PROBE: NEGATIVE
SARS-COV-2 RNA RESP QL NAA+PROBE: NEGATIVE

## 2022-06-20 NOTE — PROGRESS NOTES
I called the patient to follow up after her recent ED visit for diarrhea and nausea  The patient states she was told she had a "bug" which she disagrees with  She noted the past 3-4 days she was very ill (patient has had nausea for a month)  The patient states that both the nausea and diarrhea have improved slightly with the medications she was prescribed  She reports continued fatigue as she has been unable to sleep more than an hour at a time  The patient notes she had 1/2 piece of toast this morning and has been drinking water  She reports her weight from 2 days ago was 270 (last noted weight was on 4/13 at a Cardiology appointment and was 282)  The patient stated she checked her blood sugar this morning but could not recall what it was  I asked the patient if she wanted an appointment but she states she is having difficulty walking and will wait to be seen until next week at her PCP appointment  The patient did agree to return to the ED if her symptoms worsen or do not improve  She will call with any questions or concerns  I will continue to follow  Chart reviewed

## 2022-06-21 ENCOUNTER — HOSPITAL ENCOUNTER (INPATIENT)
Facility: HOSPITAL | Age: 62
LOS: 10 days | Discharge: HOME HEALTH | DRG: 247 | End: 2022-07-02
Attending: EMERGENCY MEDICINE | Admitting: INTERNAL MEDICINE
Payer: COMMERCIAL

## 2022-06-21 DIAGNOSIS — I21.4 NSTEMI (NON-ST ELEVATED MYOCARDIAL INFARCTION) (HCC): Primary | ICD-10-CM

## 2022-06-21 DIAGNOSIS — R55 SYNCOPE: ICD-10-CM

## 2022-06-21 DIAGNOSIS — R77.8 ELEVATED TROPONIN I LEVEL: ICD-10-CM

## 2022-06-21 DIAGNOSIS — D64.9 CHRONIC ANEMIA: ICD-10-CM

## 2022-06-21 DIAGNOSIS — I25.10 CORONARY ARTERY DISEASE INVOLVING NATIVE CORONARY ARTERY OF NATIVE HEART WITHOUT ANGINA PECTORIS: ICD-10-CM

## 2022-06-21 DIAGNOSIS — N17.9 ACUTE RENAL FAILURE SUPERIMPOSED ON CHRONIC KIDNEY DISEASE (HCC): ICD-10-CM

## 2022-06-21 DIAGNOSIS — R00.1 BRADYCARDIA: ICD-10-CM

## 2022-06-21 DIAGNOSIS — I10 HYPERTENSION, UNSPECIFIED TYPE: ICD-10-CM

## 2022-06-21 DIAGNOSIS — N18.9 ACUTE RENAL FAILURE SUPERIMPOSED ON CHRONIC KIDNEY DISEASE (HCC): ICD-10-CM

## 2022-06-21 DIAGNOSIS — N17.9 AKI (ACUTE KIDNEY INJURY) (HCC): ICD-10-CM

## 2022-06-21 DIAGNOSIS — I50.43 ACUTE ON CHRONIC COMBINED SYSTOLIC AND DIASTOLIC CONGESTIVE HEART FAILURE (HCC): ICD-10-CM

## 2022-06-21 DIAGNOSIS — I42.9 CARDIOMYOPATHY, UNSPECIFIED TYPE (HCC): ICD-10-CM

## 2022-06-21 DIAGNOSIS — N31.9 NEUROGENIC BLADDER: ICD-10-CM

## 2022-06-21 DIAGNOSIS — I95.9 HYPOTENSION: ICD-10-CM

## 2022-06-21 LAB
ANION GAP SERPL CALCULATED.3IONS-SCNC: 9 MMOL/L (ref 4–13)
BASOPHILS # BLD AUTO: 0.06 THOUSANDS/ΜL (ref 0–0.1)
BASOPHILS NFR BLD AUTO: 1 % (ref 0–1)
BUN SERPL-MCNC: 78 MG/DL (ref 5–25)
CALCIUM SERPL-MCNC: 7.9 MG/DL (ref 8.3–10.1)
CARDIAC TROPONIN I PNL SERPL HS: 9361 NG/L
CHLORIDE SERPL-SCNC: 96 MMOL/L (ref 100–108)
CO2 SERPL-SCNC: 24 MMOL/L (ref 21–32)
CREAT SERPL-MCNC: 4.23 MG/DL (ref 0.6–1.3)
EOSINOPHIL # BLD AUTO: 0.17 THOUSAND/ΜL (ref 0–0.61)
EOSINOPHIL NFR BLD AUTO: 1 % (ref 0–6)
ERYTHROCYTE [DISTWIDTH] IN BLOOD BY AUTOMATED COUNT: 15.2 % (ref 11.6–15.1)
GFR SERPL CREATININE-BSD FRML MDRD: 10 ML/MIN/1.73SQ M
GLUCOSE SERPL-MCNC: 111 MG/DL (ref 65–140)
GLUCOSE SERPL-MCNC: 165 MG/DL (ref 65–140)
HCT VFR BLD AUTO: 26.3 % (ref 34.8–46.1)
HGB BLD-MCNC: 8.4 G/DL (ref 11.5–15.4)
IMM GRANULOCYTES # BLD AUTO: 0.11 THOUSAND/UL (ref 0–0.2)
IMM GRANULOCYTES NFR BLD AUTO: 1 % (ref 0–2)
LYMPHOCYTES # BLD AUTO: 2.52 THOUSANDS/ΜL (ref 0.6–4.47)
LYMPHOCYTES NFR BLD AUTO: 19 % (ref 14–44)
MCH RBC QN AUTO: 31.6 PG (ref 26.8–34.3)
MCHC RBC AUTO-ENTMCNC: 31.9 G/DL (ref 31.4–37.4)
MCV RBC AUTO: 99 FL (ref 82–98)
MONOCYTES # BLD AUTO: 1.07 THOUSAND/ΜL (ref 0.17–1.22)
MONOCYTES NFR BLD AUTO: 8 % (ref 4–12)
NEUTROPHILS # BLD AUTO: 9.13 THOUSANDS/ΜL (ref 1.85–7.62)
NEUTS SEG NFR BLD AUTO: 70 % (ref 43–75)
NRBC BLD AUTO-RTO: 0 /100 WBCS
PLATELET # BLD AUTO: 276 THOUSANDS/UL (ref 149–390)
PMV BLD AUTO: 10.3 FL (ref 8.9–12.7)
POTASSIUM SERPL-SCNC: 4.2 MMOL/L (ref 3.5–5.3)
RBC # BLD AUTO: 2.66 MILLION/UL (ref 3.81–5.12)
SODIUM SERPL-SCNC: 129 MMOL/L (ref 136–145)
WBC # BLD AUTO: 13.06 THOUSAND/UL (ref 4.31–10.16)

## 2022-06-21 PROCEDURE — 99285 EMERGENCY DEPT VISIT HI MDM: CPT

## 2022-06-21 PROCEDURE — 80048 BASIC METABOLIC PNL TOTAL CA: CPT | Performed by: EMERGENCY MEDICINE

## 2022-06-21 PROCEDURE — 82550 ASSAY OF CK (CPK): CPT | Performed by: PHYSICIAN ASSISTANT

## 2022-06-21 PROCEDURE — 84484 ASSAY OF TROPONIN QUANT: CPT | Performed by: EMERGENCY MEDICINE

## 2022-06-21 PROCEDURE — 93005 ELECTROCARDIOGRAM TRACING: CPT

## 2022-06-21 PROCEDURE — 82553 CREATINE MB FRACTION: CPT | Performed by: PHYSICIAN ASSISTANT

## 2022-06-21 PROCEDURE — 36415 COLL VENOUS BLD VENIPUNCTURE: CPT | Performed by: EMERGENCY MEDICINE

## 2022-06-21 PROCEDURE — 80076 HEPATIC FUNCTION PANEL: CPT | Performed by: PHYSICIAN ASSISTANT

## 2022-06-21 PROCEDURE — 85025 COMPLETE CBC W/AUTO DIFF WBC: CPT | Performed by: EMERGENCY MEDICINE

## 2022-06-21 PROCEDURE — 99291 CRITICAL CARE FIRST HOUR: CPT | Performed by: EMERGENCY MEDICINE

## 2022-06-21 PROCEDURE — 82948 REAGENT STRIP/BLOOD GLUCOSE: CPT

## 2022-06-21 RX ORDER — HEPARIN SODIUM 1000 [USP'U]/ML
2000 INJECTION, SOLUTION INTRAVENOUS; SUBCUTANEOUS
Status: DISCONTINUED | OUTPATIENT
Start: 2022-06-21 | End: 2022-06-24

## 2022-06-21 RX ORDER — ASPIRIN 81 MG/1
324 TABLET, CHEWABLE ORAL ONCE
Status: COMPLETED | OUTPATIENT
Start: 2022-06-21 | End: 2022-06-22

## 2022-06-21 RX ORDER — HEPARIN SODIUM 1000 [USP'U]/ML
4000 INJECTION, SOLUTION INTRAVENOUS; SUBCUTANEOUS ONCE
Status: COMPLETED | OUTPATIENT
Start: 2022-06-21 | End: 2022-06-22

## 2022-06-21 RX ORDER — HEPARIN SODIUM 1000 [USP'U]/ML
4000 INJECTION, SOLUTION INTRAVENOUS; SUBCUTANEOUS
Status: DISCONTINUED | OUTPATIENT
Start: 2022-06-21 | End: 2022-06-24

## 2022-06-21 RX ORDER — HEPARIN SODIUM 10000 [USP'U]/100ML
3-20 INJECTION, SOLUTION INTRAVENOUS
Status: DISCONTINUED | OUTPATIENT
Start: 2022-06-21 | End: 2022-06-24

## 2022-06-21 RX ORDER — SODIUM CHLORIDE, SODIUM LACTATE, POTASSIUM CHLORIDE, CALCIUM CHLORIDE 600; 310; 30; 20 MG/100ML; MG/100ML; MG/100ML; MG/100ML
1000 INJECTION, SOLUTION INTRAVENOUS CONTINUOUS
Status: DISCONTINUED | OUTPATIENT
Start: 2022-06-21 | End: 2022-06-22

## 2022-06-22 ENCOUNTER — APPOINTMENT (INPATIENT)
Dept: RADIOLOGY | Facility: HOSPITAL | Age: 62
DRG: 247 | End: 2022-06-22
Payer: COMMERCIAL

## 2022-06-22 ENCOUNTER — APPOINTMENT (INPATIENT)
Dept: ULTRASOUND IMAGING | Facility: HOSPITAL | Age: 62
DRG: 247 | End: 2022-06-22
Payer: COMMERCIAL

## 2022-06-22 ENCOUNTER — APPOINTMENT (INPATIENT)
Dept: NON INVASIVE DIAGNOSTICS | Facility: HOSPITAL | Age: 62
DRG: 247 | End: 2022-06-22
Payer: COMMERCIAL

## 2022-06-22 PROBLEM — R07.9 CHEST PAIN: Status: ACTIVE | Noted: 2022-06-22

## 2022-06-22 PROBLEM — R79.89 ELEVATED TROPONIN I LEVEL: Status: ACTIVE | Noted: 2022-06-22

## 2022-06-22 PROBLEM — R77.8 ELEVATED TROPONIN I LEVEL: Status: ACTIVE | Noted: 2022-06-22

## 2022-06-22 PROBLEM — R55 SYNCOPE: Status: ACTIVE | Noted: 2022-06-22

## 2022-06-22 PROBLEM — N17.9 ACUTE RENAL FAILURE SUPERIMPOSED ON CHRONIC KIDNEY DISEASE (HCC): Status: ACTIVE | Noted: 2022-06-22

## 2022-06-22 PROBLEM — I95.9 HYPOTENSION: Status: ACTIVE | Noted: 2022-06-22

## 2022-06-22 PROBLEM — I50.42 CHRONIC COMBINED SYSTOLIC AND DIASTOLIC CHF (CONGESTIVE HEART FAILURE) (HCC): Status: ACTIVE | Noted: 2021-08-24

## 2022-06-22 PROBLEM — K52.9 GASTROENTERITIS: Status: ACTIVE | Noted: 2022-06-22

## 2022-06-22 PROBLEM — N18.9 ACUTE RENAL FAILURE SUPERIMPOSED ON CHRONIC KIDNEY DISEASE (HCC): Status: ACTIVE | Noted: 2022-06-22

## 2022-06-22 LAB
2HR DELTA HS TROPONIN: -819 NG/L
4HR DELTA HS TROPONIN: -1458 NG/L
ALBUMIN SERPL BCP-MCNC: 2.6 G/DL (ref 3.5–5)
ALBUMIN SERPL BCP-MCNC: 2.7 G/DL (ref 3.5–5)
ALP SERPL-CCNC: 100 U/L (ref 46–116)
ALP SERPL-CCNC: 103 U/L (ref 46–116)
ALT SERPL W P-5'-P-CCNC: 110 U/L (ref 12–78)
ALT SERPL W P-5'-P-CCNC: 137 U/L (ref 12–78)
ANION GAP SERPL CALCULATED.3IONS-SCNC: 11 MMOL/L (ref 4–13)
APICAL FOUR CHAMBER EJECTION FRACTION: 46 %
APTT PPP: 36 SECONDS (ref 23–37)
APTT PPP: 36 SECONDS (ref 23–37)
APTT PPP: 50 SECONDS (ref 23–37)
APTT PPP: 56 SECONDS (ref 23–37)
AST SERPL W P-5'-P-CCNC: 110 U/L (ref 5–45)
AST SERPL W P-5'-P-CCNC: 92 U/L (ref 5–45)
ATRIAL RATE: 47 BPM
ATRIAL RATE: 48 BPM
ATRIAL RATE: 48 BPM
BASOPHILS # BLD AUTO: 0.07 THOUSANDS/ΜL (ref 0–0.1)
BASOPHILS NFR BLD AUTO: 1 % (ref 0–1)
BILIRUB DIRECT SERPL-MCNC: 0.1 MG/DL (ref 0–0.2)
BILIRUB SERPL-MCNC: 0.22 MG/DL (ref 0.2–1)
BILIRUB SERPL-MCNC: 0.24 MG/DL (ref 0.2–1)
BUN SERPL-MCNC: 81 MG/DL (ref 5–25)
CALCIUM ALBUM COR SERPL-MCNC: 8.9 MG/DL (ref 8.3–10.1)
CALCIUM SERPL-MCNC: 7.9 MG/DL (ref 8.3–10.1)
CARDIAC TROPONIN I PNL SERPL HS: 7903 NG/L
CARDIAC TROPONIN I PNL SERPL HS: 8542 NG/L
CHLORIDE SERPL-SCNC: 97 MMOL/L (ref 100–108)
CK MB SERPL-MCNC: 16.4 NG/ML (ref 0–5)
CK MB SERPL-MCNC: 4.3 % (ref 0–2.5)
CK SERPL-CCNC: 378 U/L (ref 26–192)
CO2 SERPL-SCNC: 22 MMOL/L (ref 21–32)
CREAT SERPL-MCNC: 4.37 MG/DL (ref 0.6–1.3)
EOSINOPHIL # BLD AUTO: 0.19 THOUSAND/ΜL (ref 0–0.61)
EOSINOPHIL NFR BLD AUTO: 2 % (ref 0–6)
ERYTHROCYTE [DISTWIDTH] IN BLOOD BY AUTOMATED COUNT: 15.2 % (ref 11.6–15.1)
GFR SERPL CREATININE-BSD FRML MDRD: 10 ML/MIN/1.73SQ M
GLUCOSE SERPL-MCNC: 122 MG/DL (ref 65–140)
GLUCOSE SERPL-MCNC: 127 MG/DL (ref 65–140)
GLUCOSE SERPL-MCNC: 130 MG/DL (ref 65–140)
GLUCOSE SERPL-MCNC: 227 MG/DL (ref 65–140)
GLUCOSE SERPL-MCNC: 48 MG/DL (ref 65–140)
GLUCOSE SERPL-MCNC: 85 MG/DL (ref 65–140)
HCT VFR BLD AUTO: 24.8 % (ref 34.8–46.1)
HGB BLD-MCNC: 8.1 G/DL (ref 11.5–15.4)
IMM GRANULOCYTES # BLD AUTO: 0.11 THOUSAND/UL (ref 0–0.2)
IMM GRANULOCYTES NFR BLD AUTO: 1 % (ref 0–2)
INR PPP: 1.21 (ref 0.84–1.19)
LEFT VENTRICLE DIASTOLIC VOLUME (MOD BIPLANE): 246 ML
LEFT VENTRICLE SYSTOLIC VOLUME (MOD BIPLANE): 130 ML
LV EF: 47 %
LYMPHOCYTES # BLD AUTO: 3.35 THOUSANDS/ΜL (ref 0.6–4.47)
LYMPHOCYTES NFR BLD AUTO: 26 % (ref 14–44)
MCH RBC QN AUTO: 32.1 PG (ref 26.8–34.3)
MCHC RBC AUTO-ENTMCNC: 32.7 G/DL (ref 31.4–37.4)
MCV RBC AUTO: 98 FL (ref 82–98)
MONOCYTES # BLD AUTO: 0.99 THOUSAND/ΜL (ref 0.17–1.22)
MONOCYTES NFR BLD AUTO: 8 % (ref 4–12)
NEUTROPHILS # BLD AUTO: 8.26 THOUSANDS/ΜL (ref 1.85–7.62)
NEUTS SEG NFR BLD AUTO: 62 % (ref 43–75)
NRBC BLD AUTO-RTO: 0 /100 WBCS
P AXIS: 17 DEGREES
P AXIS: 18 DEGREES
P AXIS: 26 DEGREES
P AXIS: 32 DEGREES
P AXIS: 39 DEGREES
PLATELET # BLD AUTO: 265 THOUSANDS/UL (ref 149–390)
PMV BLD AUTO: 9.8 FL (ref 8.9–12.7)
POTASSIUM SERPL-SCNC: 4.2 MMOL/L (ref 3.5–5.3)
PR INTERVAL: 156 MS
PR INTERVAL: 158 MS
PR INTERVAL: 158 MS
PR INTERVAL: 168 MS
PR INTERVAL: 170 MS
PROT SERPL-MCNC: 6.2 G/DL (ref 6.4–8.2)
PROT SERPL-MCNC: 6.2 G/DL (ref 6.4–8.2)
PROTHROMBIN TIME: 14.9 SECONDS (ref 11.6–14.5)
QRS AXIS: 140 DEGREES
QRS AXIS: 144 DEGREES
QRS AXIS: 144 DEGREES
QRS AXIS: 148 DEGREES
QRS AXIS: 148 DEGREES
QRSD INTERVAL: 148 MS
QRSD INTERVAL: 148 MS
QRSD INTERVAL: 152 MS
QRSD INTERVAL: 156 MS
QRSD INTERVAL: 158 MS
QT INTERVAL: 500 MS
QT INTERVAL: 506 MS
QT INTERVAL: 506 MS
QT INTERVAL: 510 MS
QT INTERVAL: 514 MS
QTC INTERVAL: 446 MS
QTC INTERVAL: 447 MS
QTC INTERVAL: 451 MS
QTC INTERVAL: 452 MS
QTC INTERVAL: 454 MS
RA PRESSURE ESTIMATED: 15 MMHG
RBC # BLD AUTO: 2.52 MILLION/UL (ref 3.81–5.12)
RV PSP: 39 MMHG
SL CV LV EF: 30
SODIUM SERPL-SCNC: 130 MMOL/L (ref 136–145)
T WAVE AXIS: -67 DEGREES
T WAVE AXIS: -70 DEGREES
T WAVE AXIS: -74 DEGREES
T WAVE AXIS: 249 DEGREES
T WAVE AXIS: 264 DEGREES
TR MAX PG: 24 MMHG
TR PEAK VELOCITY: 2.4 M/S
TRICUSPID VALVE PEAK REGURGITATION VELOCITY: 2.44 M/S
VENTRICULAR RATE: 47 BPM
VENTRICULAR RATE: 48 BPM
VENTRICULAR RATE: 48 BPM
WBC # BLD AUTO: 12.97 THOUSAND/UL (ref 4.31–10.16)

## 2022-06-22 PROCEDURE — 80053 COMPREHEN METABOLIC PANEL: CPT | Performed by: PHYSICIAN ASSISTANT

## 2022-06-22 PROCEDURE — 99223 1ST HOSP IP/OBS HIGH 75: CPT | Performed by: INTERNAL MEDICINE

## 2022-06-22 PROCEDURE — 93325 DOPPLER ECHO COLOR FLOW MAPG: CPT

## 2022-06-22 PROCEDURE — 82948 REAGENT STRIP/BLOOD GLUCOSE: CPT

## 2022-06-22 PROCEDURE — 85730 THROMBOPLASTIN TIME PARTIAL: CPT | Performed by: FAMILY MEDICINE

## 2022-06-22 PROCEDURE — 84484 ASSAY OF TROPONIN QUANT: CPT | Performed by: EMERGENCY MEDICINE

## 2022-06-22 PROCEDURE — 99223 1ST HOSP IP/OBS HIGH 75: CPT | Performed by: PHYSICIAN ASSISTANT

## 2022-06-22 PROCEDURE — 85730 THROMBOPLASTIN TIME PARTIAL: CPT | Performed by: PHYSICIAN ASSISTANT

## 2022-06-22 PROCEDURE — 99223 1ST HOSP IP/OBS HIGH 75: CPT

## 2022-06-22 PROCEDURE — 93321 DOPPLER ECHO F-UP/LMTD STD: CPT

## 2022-06-22 PROCEDURE — 85730 THROMBOPLASTIN TIME PARTIAL: CPT | Performed by: EMERGENCY MEDICINE

## 2022-06-22 PROCEDURE — 93005 ELECTROCARDIOGRAM TRACING: CPT

## 2022-06-22 PROCEDURE — 93308 TTE F-UP OR LMTD: CPT

## 2022-06-22 PROCEDURE — 85025 COMPLETE CBC W/AUTO DIFF WBC: CPT | Performed by: PHYSICIAN ASSISTANT

## 2022-06-22 PROCEDURE — 36415 COLL VENOUS BLD VENIPUNCTURE: CPT | Performed by: EMERGENCY MEDICINE

## 2022-06-22 PROCEDURE — 93010 ELECTROCARDIOGRAM REPORT: CPT | Performed by: INTERNAL MEDICINE

## 2022-06-22 PROCEDURE — 85610 PROTHROMBIN TIME: CPT | Performed by: EMERGENCY MEDICINE

## 2022-06-22 PROCEDURE — 71045 X-RAY EXAM CHEST 1 VIEW: CPT

## 2022-06-22 RX ORDER — OLANZAPINE 5 MG/1
5 TABLET ORAL
Status: DISCONTINUED | OUTPATIENT
Start: 2022-06-22 | End: 2022-07-02 | Stop reason: HOSPADM

## 2022-06-22 RX ORDER — ONDANSETRON 2 MG/ML
4 INJECTION INTRAMUSCULAR; INTRAVENOUS EVERY 6 HOURS PRN
Status: DISCONTINUED | OUTPATIENT
Start: 2022-06-22 | End: 2022-07-02 | Stop reason: HOSPADM

## 2022-06-22 RX ORDER — DEXTROSE MONOHYDRATE 25 G/50ML
25 INJECTION, SOLUTION INTRAVENOUS ONCE
Status: DISCONTINUED | OUTPATIENT
Start: 2022-06-22 | End: 2022-06-22

## 2022-06-22 RX ORDER — CITALOPRAM 20 MG/1
40 TABLET ORAL DAILY
Status: DISCONTINUED | OUTPATIENT
Start: 2022-06-22 | End: 2022-07-02 | Stop reason: HOSPADM

## 2022-06-22 RX ORDER — HYDROCODONE BITARTRATE AND ACETAMINOPHEN 5; 325 MG/1; MG/1
1 TABLET ORAL ONCE
Status: COMPLETED | OUTPATIENT
Start: 2022-06-22 | End: 2022-06-22

## 2022-06-22 RX ORDER — ATORVASTATIN CALCIUM 40 MG/1
40 TABLET, FILM COATED ORAL
Status: DISCONTINUED | OUTPATIENT
Start: 2022-06-22 | End: 2022-07-02

## 2022-06-22 RX ORDER — INSULIN LISPRO 100 [IU]/ML
1-6 INJECTION, SOLUTION INTRAVENOUS; SUBCUTANEOUS
Status: DISCONTINUED | OUTPATIENT
Start: 2022-06-22 | End: 2022-07-02 | Stop reason: HOSPADM

## 2022-06-22 RX ORDER — ALLOPURINOL 100 MG/1
300 TABLET ORAL DAILY
Status: DISCONTINUED | OUTPATIENT
Start: 2022-06-22 | End: 2022-07-02 | Stop reason: HOSPADM

## 2022-06-22 RX ORDER — CLOPIDOGREL BISULFATE 75 MG/1
75 TABLET ORAL DAILY
Status: DISCONTINUED | OUTPATIENT
Start: 2022-06-22 | End: 2022-07-02 | Stop reason: HOSPADM

## 2022-06-22 RX ORDER — ISOSORBIDE MONONITRATE 60 MG/1
60 TABLET, EXTENDED RELEASE ORAL DAILY
Status: DISCONTINUED | OUTPATIENT
Start: 2022-06-22 | End: 2022-07-02 | Stop reason: HOSPADM

## 2022-06-22 RX ORDER — ACETAMINOPHEN 325 MG/1
650 TABLET ORAL EVERY 6 HOURS PRN
Status: DISCONTINUED | OUTPATIENT
Start: 2022-06-22 | End: 2022-07-02 | Stop reason: HOSPADM

## 2022-06-22 RX ORDER — INSULIN GLARGINE 100 [IU]/ML
25 INJECTION, SOLUTION SUBCUTANEOUS EVERY 12 HOURS SCHEDULED
Status: DISCONTINUED | OUTPATIENT
Start: 2022-06-22 | End: 2022-06-24

## 2022-06-22 RX ORDER — SODIUM CHLORIDE 9 MG/ML
100 INJECTION, SOLUTION INTRAVENOUS CONTINUOUS
Status: DISCONTINUED | OUTPATIENT
Start: 2022-06-22 | End: 2022-06-22

## 2022-06-22 RX ORDER — SODIUM CHLORIDE 9 MG/ML
50 INJECTION, SOLUTION INTRAVENOUS CONTINUOUS
Status: DISCONTINUED | OUTPATIENT
Start: 2022-06-22 | End: 2022-06-23

## 2022-06-22 RX ORDER — LEVOTHYROXINE SODIUM 0.05 MG/1
50 TABLET ORAL
Status: DISCONTINUED | OUTPATIENT
Start: 2022-06-22 | End: 2022-07-02 | Stop reason: HOSPADM

## 2022-06-22 RX ADMIN — ONDANSETRON 4 MG: 2 INJECTION INTRAMUSCULAR; INTRAVENOUS at 06:31

## 2022-06-22 RX ADMIN — SODIUM CHLORIDE 100 ML/HR: 0.9 INJECTION, SOLUTION INTRAVENOUS at 06:38

## 2022-06-22 RX ADMIN — HYDROCODONE BITARTRATE AND ACETAMINOPHEN 1 TABLET: 5; 325 TABLET ORAL at 21:03

## 2022-06-22 RX ADMIN — ASPIRIN 81 MG CHEWABLE TABLET 243 MG: 81 TABLET CHEWABLE at 00:24

## 2022-06-22 RX ADMIN — INSULIN LISPRO 2 UNITS: 100 INJECTION, SOLUTION INTRAVENOUS; SUBCUTANEOUS at 21:03

## 2022-06-22 RX ADMIN — ONDANSETRON 4 MG: 2 INJECTION INTRAMUSCULAR; INTRAVENOUS at 17:55

## 2022-06-22 RX ADMIN — HEPARIN SODIUM 2000 UNITS: 1000 INJECTION INTRAVENOUS; SUBCUTANEOUS at 07:44

## 2022-06-22 RX ADMIN — HEPARIN SODIUM 17.1 UNITS/KG/HR: 10000 INJECTION, SOLUTION INTRAVENOUS at 22:06

## 2022-06-22 RX ADMIN — OLANZAPINE 5 MG: 5 TABLET, FILM COATED ORAL at 21:03

## 2022-06-22 RX ADMIN — INSULIN GLARGINE 25 UNITS: 100 INJECTION, SOLUTION SUBCUTANEOUS at 10:04

## 2022-06-22 RX ADMIN — INSULIN GLARGINE 25 UNITS: 100 INJECTION, SOLUTION SUBCUTANEOUS at 21:03

## 2022-06-22 RX ADMIN — ISOSORBIDE MONONITRATE 60 MG: 60 TABLET, EXTENDED RELEASE ORAL at 09:58

## 2022-06-22 RX ADMIN — CLOPIDOGREL BISULFATE 75 MG: 75 TABLET ORAL at 09:58

## 2022-06-22 RX ADMIN — CITALOPRAM HYDROBROMIDE 40 MG: 20 TABLET ORAL at 09:59

## 2022-06-22 RX ADMIN — SODIUM CHLORIDE, SODIUM LACTATE, POTASSIUM CHLORIDE, AND CALCIUM CHLORIDE 1000 ML: .6; .31; .03; .02 INJECTION, SOLUTION INTRAVENOUS at 00:21

## 2022-06-22 RX ADMIN — ALLOPURINOL 300 MG: 300 TABLET ORAL at 09:58

## 2022-06-22 RX ADMIN — HEPARIN SODIUM 4000 UNITS: 1000 INJECTION INTRAVENOUS; SUBCUTANEOUS at 00:21

## 2022-06-22 RX ADMIN — ACETAMINOPHEN 325MG 650 MG: 325 TABLET ORAL at 17:54

## 2022-06-22 RX ADMIN — HEPARIN SODIUM 11.1 UNITS/KG/HR: 10000 INJECTION, SOLUTION INTRAVENOUS at 00:22

## 2022-06-22 RX ADMIN — HEPARIN SODIUM 4000 UNITS: 1000 INJECTION INTRAVENOUS; SUBCUTANEOUS at 22:07

## 2022-06-22 RX ADMIN — ATORVASTATIN CALCIUM 40 MG: 40 TABLET, FILM COATED ORAL at 15:42

## 2022-06-22 RX ADMIN — LEVOTHYROXINE SODIUM 50 MCG: 50 TABLET ORAL at 06:31

## 2022-06-22 RX ADMIN — SODIUM CHLORIDE 75 ML/HR: 0.9 INJECTION, SOLUTION INTRAVENOUS at 11:43

## 2022-06-22 NOTE — ASSESSMENT & PLAN NOTE
Wt Readings from Last 3 Encounters:   06/21/22 130 kg (286 lb 9 6 oz)   06/19/22 128 kg (282 lb)   04/13/22 128 kg (282 lb)

## 2022-06-22 NOTE — CONSULTS
Consultation - Nephrology   Aliza Cazares 63 y o  female MRN: [id number]  Unit/Bed#: ED 17 Encounter: [id number]    ASSESSMENT/PLAN:  ALFRED (POA) on CKD IV:  Suspect prerenal due to volume depletion possibly progressing to ATN in setting of GI losses  -Admission creatinine 4 23  Today's creatinine 4 37  -Peak creatinine 4 37, trending  -baseline creatinine recently 2 5-2 9  Most recent creat 2 14 on 6/19/22  -history of ALFRED in Feb '22 related to cardiorenal syndrome with peak creat 3 13  -workup: UA with 100 glucose, blood, leukocytes, >500 protein, 20-30 RBC, 20-30 WBC, 0-1 hyaline casts  -Imaging:  CXR with mild cardiomegaly and right basilar infiltrate/atelectasis  -nonoliguric  Suprapubic catheter in place  -Plan:   Clinically, patient is not uremic and there is no acute indication for renal replacement therapy (dialysis)   Continue IVF currently, decrease rate given underlying cardiac function   Hold diuretics   Check renal ultrasound   Strict I/Os, daily weights   Avoid nephrotoxins, NSAIDs, IV contrast if possible   Avoid hypotension  Maintain MAP >65   Trend BMP   Adjust meds to appropriate GFR   Optimize hemodynamic status to avoid delay in renal recovery   Will d/w Dr Rob Class    Chronic kidney disease IV:    -Etiology:  Suspect related to diabetic nephropathy, hypertensive nephrosclerosis, cardiorenal syndrome, multiple episodes of ALFRED and obesity  -Baseline creatinine 2 5-2 9 recently but most recent creatinine 2 1 on 6/19/2022  -Follows with Dr Mine Delacruz    Pt no show for last scheduled appt 5/25/22  -Will need follow-up on discharge for long-term management    Hypertension/Volume status:  -BP soft  -clinically, examines near euvolemic  -Home medications:  Bisoprolol 5 mg daily, per side 40 mg daily, amlodipine 5 mg daily, Imdur 60 mg daily  -Current medications:  Imdur 60 mg daily  -hold antihypertensives in the setting of hypotension  -Optimize hemodynamic status to avoid delay in renal recovery  -recommend hold parameters on antihypertensive's for SBP <130 mmHg  -Avoid hypotension or fluctuations in blood pressure  Maintain MAP >65  -continue to trend    Hyponatremia:  -likely related to volume depletion  -serum sodium 129 on admission increased to 130 with IV fluids  -continue IVF  -trend BMP in a m   -continue to monitor    Acid/base:  -stable  -current bicarb 22, AG 11  -continue to monitor    Nephrotic range proteinuria:  -hx chronic proteinuria  -UA with >500 protein on admission  -UPC ratio by 0 74 on 8/3/2021  -no ACE/ARB due to ALFRED  -continue to monitor    Chronic combined diastolic/systolic congestive heart failure:  -EF 45% with grade 2 diastolic dysfunction  -volume status appears euvolemic presently  -on beta-blockers and torsemide 40 mg daily at home  -diuretics on hold due to ALFRED  -repeat echo  -cardiology following  -management per cardiology and primary team    Chest pain with elevated troponin:  -repeat echocardiogram pending  -on heparin drip  -cardiology following  -management per Cardiology    Anemia of CKD:  -Hgb 8 1 and below goal   Goal >10  -Iron studies 2/15/2022:  Iron 54, ferritin 51, iron saturation 23%, TIBC 234   Folate >20  -on oral iron  -continue to trend  -Transfuse for Hgb <7 0  -management per primary team    Bone Mineral Disease of CKD:  -continue to monitor and follow phosphorus, PTH, magnesium, calcium, vitamin-D 25 as outpatient    DM II:  -HgbA1C 7 3 on 3/29/22  -continue to optimize blood sugar control to slow progression of chronic kidney disease  -management per primary team    HISTORY OF PRESENT ILLNESS:  Requesting Physician: Jessica Patton MD  Reason for Consult: ALFRED,POA on CKD IV    Magdalena Baig is a 63y o  year old female with CKD 4 with recent baseline creatinine 2 5-2 9 followed by Dr Maribell Abreu, nephrotic range proteinuria, chronic anemia, uncontrolled DM 2, HTN, HLD, GERD, hypothyroidism, CAD, s/p PCI/stent, chronic combined diastolic/systolic congestive heart failure, OA, depression, morbid obesity and neurogenic bladder with suprapubic catheter who was admitted to [facility] after presenting with syncope, chest pain and hypertension  Patient reports 62 history of generalized malaise, diarrhea, progressive dyspnea on exertion and shortness of breath or development of chest pain x2 days  Heparin drip initiated  Patient admitted with ALFRED with creatinine 4 23, increased to 4 37 today and hyponatremia with serum sodium 129, 130 today  Patient on torsemide 40 mg daily at home  She has been taking all her medications  She denies NSAID use  A renal consultation is requested today for assistance in the management of ALFRED, POA on CKD IV      PAST MEDICAL HISTORY:  Past Medical History:   Diagnosis Date    Abnormal liver function     Anemia     Anxiety     Arthritis     Chronic kidney disease     stage 3    Chronic narcotic dependence (HCC)     Chronic pain disorder     lower back, hands , neck and knees    Coronary artery disease     Depression     Diabetes mellitus ([address] )     Elevated troponin 2/11/2022    GERD (gastroesophageal reflux disease)     no meds at present    Heart murmur     murmur    Hyperlipidemia     Hypertension     Neurogenic bladder     Open toe wound 12/2020    right big toe open calus but is dry at present    Renal disorder     Shortness of breath     exertion    Sleep apnea     doesn't use cpap    Suprapubic catheter ([facility] [address] )        PAST SURGICAL HISTORY:  Past Surgical History:   Procedure Laterality Date    AMPUTATION      ANGIOPLASTY  2017    5    BREAST EXCISIONAL BIOPSY Left     BREAST SURGERY      CARDIAC CATHETERIZATION      CARPAL TUNNEL RELEASE Bilateral     CERVICAL FUSION      HYSTERECTOMY  2008    IR SUPRAPUBIC CATHETER PLACEMENT  6/15/2021    KNEE SURGERY      OOPHORECTOMY  2008    MO EXC SKIN BENIG 3 1-4 CM REMAINDR BODY N/A 12/21/2020    Procedure: EXCISION SEBACEOUS CYST X 5 SCALP;  Surgeon: Brett Concepcion MD;  Location:  MAIN OR;  Service: General    TOE AMPUTATION Left     TRIGGER FINGER RELEASE Right     4th Finger       ALLERGIES:  Allergies   Allergen Reactions    Codeine      Other reaction(s): Nausea and Vomiting    Latex Itching       SOCIAL HISTORY:  Social History     Substance and Sexual Activity   Alcohol Use Not Currently     Social History     Substance and Sexual Activity   Drug Use Never     Social History     Tobacco Use   Smoking Status Former Smoker    Packs/day: 1 00    Years: 35 00    Pack years: 35 00    Types: Cigarettes    Quit date: 2012    Years since quitting: 10 4   Smokeless Tobacco Never Used       FAMILY HISTORY:  Family History   Problem Relation Age of Onset    Stroke Father     Heart disease Father     No Known Problems Mother     No Known Problems Sister     No Known Problems Daughter     No Known Problems Maternal Grandmother     No Known Problems Maternal Grandfather     No Known Problems Paternal Grandmother     No Known Problems Paternal Grandfather     No Known Problems Maternal Aunt     No Known Problems Maternal Aunt     No Known Problems Maternal Aunt     No Known Problems Maternal Aunt     No Known Problems Maternal Aunt     No Known Problems Maternal Aunt     No Known Problems Paternal Aunt        MEDICATIONS:    Current Facility-Administered Medications:     allopurinol (ZYLOPRIM) tablet 300 mg, 300 mg, Oral, Daily, Debi House PA-C, 300 mg at 06/22/22 2050    atorvastatin (LIPITOR) tablet 40 mg, 40 mg, Oral, Daily With Dinner, Debi House PA-C    citalopram (CeleXA) tablet 40 mg, 40 mg, Oral, Daily, Debi House PA-C, 40 mg at 06/22/22 0959    clopidogrel (PLAVIX) tablet 75 mg, 75 mg, Oral, Daily, Debi House PA-C, 75 mg at 06/22/22 0958    heparin (porcine) 25,000 units in 0 45% NaCl 250 mL infusion (premix), 3-20 Units/kg/hr (Order-Specific), Intravenous, Titrated, Debi HERNANDEZ SETH House, Last Rate: 11 8 mL/hr at 06/22/22 0747, 13 1 Units/kg/hr at 06/22/22 0747    heparin (porcine) injection 2,000 Units, 2,000 Units, Intravenous, Q1H PRN, Bhargavi Saini PA-C, 2,000 Units at 06/22/22 0744    heparin (porcine) injection 4,000 Units, 4,000 Units, Intravenous, Q1H PRN, Debi House PA-C    insulin glargine (LANTUS) subcutaneous injection 25 Units 0 25 mL, 25 Units, Subcutaneous, Q12H [address], Debi House PA-C, 25 Units at 06/22/22 1004    insulin lispro (HumaLOG) 100 units/mL subcutaneous injection 1-6 Units, 1-6 Units, Subcutaneous, 4x Daily (AC & HS) **AND** Fingerstick Glucose (POCT), , , 4x Daily AC and at bedtime, Debi House PA-C    isosorbide mononitrate (IMDUR) 24 hr tablet 60 mg, 60 mg, Oral, Daily, Debi House PA-C, 60 mg at 06/22/22 1568    levothyroxine tablet 50 mcg, 50 mcg, Oral, Early Morning, Debi House PA-C, 50 mcg at 06/22/22 0631    OLANZapine (ZyPREXA) tablet 5 mg, 5 mg, Oral, HS, Debi House PA-C    ondansetron (ZOFRAN) injection 4 mg, 4 mg, Intravenous, Q6H PRN, Debi House PA-C, 4 mg at 06/22/22 0631    sodium chloride 0 9 % infusion, 100 mL/hr, Intravenous, Continuous, Debi House PA-C, Last Rate: 100 mL/hr at 06/22/22 0638, 100 mL/hr at 06/22/22 0026    Current Outpatient Medications:     allopurinol (ZYLOPRIM) 300 mg tablet, Take 1 tablet (300 mg total) by mouth daily, Disp: 90 tablet, Rfl: 1    amLODIPine (NORVASC) 5 mg tablet, Take 1 tablet (5 mg total) by mouth daily, Disp: 90 tablet, Rfl: 1    aspirin (ECOTRIN LOW STRENGTH) 81 mg EC tablet, Take 1 tablet (81 mg total) by mouth daily, Disp: 90 tablet, Rfl: 1    atorvastatin (LIPITOR) 40 mg tablet, Take 1 tablet (40 mg total) by mouth daily, Disp: 90 tablet, Rfl: 1    bisoprolol (ZEBETA) 5 mg tablet, Take 1 tablet (5 mg total) by mouth daily, Disp: 90 tablet, Rfl: 3    citalopram (CeleXA) 40 mg tablet, Take 1 tablet (40 mg total) by mouth daily, Disp: 90 tablet, Rfl: 1    clopidogrel (PLAVIX) 75 mg tablet, Take 1 tablet (75 mg total) by mouth daily, Disp: 90 tablet, Rfl: 1    docusate sodium (COLACE) 100 mg capsule, Take 1 capsule (100 mg total) by mouth 2 (two) times a day, Disp: 10 capsule, Rfl: 0    Dulaglutide (Trulicity) 1 5 CX/1 6DS SOPN, Inject 0 5 mL (1 5 mg total) under the skin once a week, Disp: 6 mL, Rfl: 1    ferrous sulfate 325 (65 Fe) mg tablet, Take 1 tablet (325 mg total) by mouth every other day, Disp: 45 tablet, Rfl: 1    gabapentin (NEURONTIN) 800 mg tablet, Take 1 tablet (800 mg total) by mouth 4 (four) times a day, Disp: 120 tablet, Rfl: 2    HYDROcodone-acetaminophen (NORCO) 5-325 mg per tablet, Take 1 tablet by mouth 3 (three) times a day as needed for pain For ongoing pain Max Daily Amount: 3 tablets, Disp: 90 tablet, Rfl: 0    insulin glargine (Lantus SoloStar) 100 units/mL injection pen, Inject 50 Units under the skin every 12 (twelve) hours, Disp: 30 mL, Rfl: 3    insulin lispro (HumaLOG) 100 units/mL injection pen, Inject 20 Units under the skin 3 (three) times a day with meals, Disp: 20 mL, Rfl: 2    Insulin Pen Needle (BD Pen Needle Micro U/F) 32G X 6 MM MISC, Use 5 (five) times a day, Disp: 200 each, Rfl: 5    Insulin Syringe-Needle U-100 (BD Veo Insulin Syringe U/F) 31G X 15/64" 0 5 ML MISC, Inject under the skin 3 (three) times a day, Disp: 100 each, Rfl: 2    isosorbide mononitrate (IMDUR) 60 mg 24 hr tablet, TAKE 1 TABLET BY MOUTH EVERY DAY, Disp: 90 tablet, Rfl: 2    Lancets (OneTouch Delica Plus [id number]) MISC, USE TO TEST 3 TIMES DAILY, Disp: 100 each, Rfl: 2    levothyroxine 50 mcg tablet, TAKE 1 TABLET BY MOUTH EVERY DAY, Disp: 60 tablet, Rfl: 0    loperamide (IMODIUM) 2 mg capsule, Take 1 capsule (2 mg total) by mouth 4 (four) times a day as needed for diarrhea, Disp: 12 capsule, Rfl: 0    naloxone (NARCAN) 4 mg/0 1 mL nasal spray, Administer 1 spray into a nostril   If breathing does not return to normal or if breathing difficulty resumes after 2-3 minutes, give another dose in the other nostril using a new spray , Disp: 1 each, Rfl: 1    nitroglycerin (NITROSTAT) 0 4 mg SL tablet, Place 1 tablet (0 4 mg total) under the tongue every 5 (five) minutes as needed for chest pain, Disp: 25 tablet, Rfl: 1    nystatin (MYCOSTATIN) powder, Apply topically 2 (two) times a day, Disp: 30 g, Rfl: 1    OLANZapine (ZyPREXA) 5 mg tablet, Take 1 tablet (5 mg total) by mouth daily at bedtime, Disp: 90 tablet, Rfl: 0    ondansetron (ZOFRAN) 4 mg tablet, Take 1 tablet (4 mg total) by mouth every 8 (eight) hours as needed for nausea or vomiting, Disp: 20 tablet, Rfl: 0    ondansetron (ZOFRAN-ODT) 4 mg disintegrating tablet, Take 1 tablet (4 mg total) by mouth every 8 (eight) hours as needed for nausea or vomiting, Disp: 20 tablet, Rfl: 0    OneTouch Ultra test strip, USE 1 EACH DAILY USE AS INSTRUCTED, Disp: 100 strip, Rfl: 2    pantoprazole (PROTONIX) 40 mg tablet, TAKE 1 TABLET BY MOUTH TWICE A DAY, Disp: 180 tablet, Rfl: 1    polyethylene glycol (GLYCOLAX) 17 GM/SCOOP powder, Take 17 g by mouth daily, Disp: 255 g, Rfl: 1    tiZANidine (ZANAFLEX) 4 mg tablet, TAKE 1 TABLET (4 MG TOTAL) BY MOUTH EVERY 8 (EIGHT) HOURS AS NEEDED FOR MUSCLE SPASMS, Disp: 270 tablet, Rfl: 1    torsemide (DEMADEX) 20 mg tablet, Take 2 tablets (40 mg total) by mouth daily, Disp: 180 tablet, Rfl: 2    REVIEW OF SYSTEMS:  A complete 10 point review of systems was performed and found to be negative unless otherwise noted in the history of present illness  General: No fevers, chills  Cardiovascular:  + chest pain, + leg edema  Respiratory: No cough, sputum production,  + shortness of breath  Gastrointestinal:  + nausea/vomiting,  + diarrhea,  No abdominal pain  Genitourinary: No hematuria  No foamy urine    No dysuria    PHYSICAL EXAM:  Current Weight: Weight - Scale: 130 kg (286 lb 9 6 oz)  First Weight: Weight - Scale: 130 kg (286 lb 9 6 oz)  Vitals:    06/22/22 0351 06/22/22 0500 06/22/22 0624 06/22/22 0750   BP: 106/54 104/60 128/62 100/59   BP Location: Right arm Right arm Right arm Right arm   Pulse: (!) 48 (!) 46 (!) 48 (!) 47   Resp: 15 14 16 16   Temp:    97 7 °F (36 5 °C)   TempSrc:    Oral   SpO2: 96% 94% 94% 95%   Weight:       Height:           Intake/Output Summary (Last 24 hours) at 6/22/2022 1056  Last data filed at 6/22/2022 0813  Gross per 24 hour   Intake --   Output 550 ml   Net -550 ml     General:  Awake, alert, appears comfortable and in no acute distress  Nontoxic  Skin:  No rash, warm, good skin turgor   Eyes:  PERRL, EOMI, sclerae nonicteric   no periorbital edema   ENT:  Moist mucous membranes  Neck:  Trachea midline, symmetric  No JVD  No carotid bruits  Chest:  Clear to auscultation bilaterally without wheezes, crackles or rhonchi  CVS:  Regular rate and rhythm without murmur, gallop or rub  S1 and S2 identified and normal   No S3, S4    Abdomen:  Soft, nontender, nondistended without masses  Normal bowel sounds x 4 quadrants  No bruit  Extremities:  Warm, pink, motor and sensory intact and well perfused  No cyanosis, pallor  No BLE edema  Neuro:  Awake, alert, oriented x3  Grossly intact  Psych:  Appropriate affect  Mentating appropriately    Normal mental status exam  :  Suprapubic catheter in place    Invasive Devices:        Lab Results:   Results from last 7 days   Lab Units 06/22/22  0637 06/21/22  2249 06/19/22  0315   WBC Thousand/uL 12 97* 13 06* 11 04*   HEMOGLOBIN g/dL 8 1* 8 4* 9 7*   HEMATOCRIT % 24 8* 26 3* 30 5*   PLATELETS Thousands/uL 265 276 216   SODIUM mmol/L 130* 129* 139   POTASSIUM mmol/L 4 2 4 2 4 9   CHLORIDE mmol/L 97* 96* 109*   CO2 mmol/L 22 24 22   BUN mg/dL 81* 78* 44*   CREATININE mg/dL 4 37* 4 23* 2 14*   CALCIUM mg/dL 7 9* 7 9* 8 7   MAGNESIUM mg/dL  --   --  2 5*   ALK PHOS U/L 100 103 93   ALT U/L 137* 110* 21   AST U/L 110* 92* 52*       Imaging Studies:  CXR 6/21:  Imaging study reviewed mild cardiomegaly and right basal infiltrate/atelectasis  EMR, including [facility] Care Everywhere, reviewed  I have personally reviewed the blood work as stated above and in my note  I have personally reviewed CXR imaging studies  I have personally reviewed internal Medicine, co-consultants and prior nephrology notes

## 2022-06-22 NOTE — H&P
[facility]  H&P- [place] 1960, 63 y o  female MRN: [id number]  Unit/Bed#: ED 17 Encounter: [id number]  Primary Care Provider: CHERELLE Buitrago   Date and time admitted to hospital: 6/21/2022 10:21 PM    * Syncope  Assessment & Plan  Witnessed 2* hypotension of unclear etiology  Monitor on telemetry, ivf treat joao and elevated troponin I    Hypotension  Assessment & Plan  Possibly due to acute illness and hypoalbuminemia although pt on antihypertensives prehospital   Given elevated troponin I monitor volume status closely  Hold oral antihypertensives  Start ivf, q4h vs   Low threshold for higher level of care if recurrence or further cp    Elevated troponin I level  Assessment & Plan  Pt did have cp earlier and was seen in ed for n/v on 6/19/22 as well as diarrhea  Does have hx of cardiac stenting however  Cp now improved troponin I in setting of joao hypotension and acute illness 9300 with delta of 8500  S/p asa load heparin gtt in ed  ivf consult cardiology and nephrology given significant joao as will likely need optimization if invasive intervention pursued    Chest pain  Assessment & Plan  Poorly described in setting of abd cramping n/v/d  Occurred in AM of day of admission now improved  Monitor for recurrence in setting of troponin elevation and known cad    Gastroenteritis  Assessment & Plan  Recent nausea and diarrheaand decreased appetite    Likely etiology for joao and possibly hypotension  Supportive care, monitor off abx    Acute renal failure superimposed on chronic kidney disease [facility])  Assessment & Plan  Lab Results   Component Value Date    EGFR 10 06/21/2022    EGFR 24 06/19/2022    EGFR 13 02/24/2022    CREATININE 4 23 (H) 06/21/2022    CREATININE 2 14 (H) 06/19/2022    CREATININE 3 47 (H) 02/24/2022   likely 2* n/v/d and decreased po intake  Check pvr I/os and ua  Hold nephrotoxins  Consul nephrology given cp and coordination of any further diagnostics of elevated troponin w/cards    Chronic combined systolic and diastolic CHF (congestive heart failure) (HCC)  Assessment & Plan  Wt Readings from Last 3 Encounters:   06/21/22 130 kg (286 lb 9 6 oz)   06/19/22 128 kg (282 lb)   04/13/22 128 kg (282 lb)             CAD (coronary artery disease)  Assessment & Plan  Hx of stenting in [facility] on asa/statin/plavix w/100% chronic RCA total occlusion on catheterization in 08/21  Hold bb given hypotension  Continue op regimen  Consult cardiology    Type 2 diabetes mellitus with stage 4 chronic kidney disease, with long-term current use of insulin ([facility])  Assessment & Plan  Lab Results   Component Value Date    HGBA1C 7 3 (A) 03/29/2022       Recent Labs     06/21/22  2227   POCGLU 165*       Blood Sugar Average: Last 72 hrs:  (P) 165   On lantus 50 iu bid and scheduled humalog  Hold scheduled humalog reduce lantus given acute illness top 20 iu bid and start ssi/poc bs    VTE Prophylaxis: Heparin Drip  / sequential compression device   Code Status:   POLST: There is no POLST form on file for this patient (pre-hospital)  Discussion with family:     Anticipated Length of Stay:  Patient will be admitted on an Inpatient basis with an anticipated length of stay of  Greater than 2 midnights  Justification for Hospital Stay: hypotension/syncope    Total Time for Visit, including Counseling / Coordination of Care: 45 minutes  Greater than 50% of this total time spent on direct patient counseling and coordination of care  Chief Complaint:   'I feel off and my bp was high'    History of Present Illness:    Maryana Manrique is a 63 y o  female who presents with pmh of cad, chronic combined systolic/diastolic chf, htn, dm, ckd stage 3, hld, neurogenic bladder coming to hospital for syncope and hypotension  Pt notes several days of n but no vomiting and diarrhea and feeling ill/malaise  Decreased appetite  Came to [facility] ED for this on 6/19/22    She was significantly hypertensive but hadn't taken her bp meds  Pt was evaluated and discharged home  She notes persistent s/sx but was not feeling well on 6/21/22  She felt 'off' and had some left sided chest discomfort earlier in the day now improved and called her daughter  Her bp was rather elevated reportedly in 160s  She took her night meds and continued to feel bad  She was evaluated by EMS and became hypotensive in front of them and syncopized  She was brought in hypotensive w/significant joao and troponin I elevation  Admission was requested  Review of Systems:    Review of Systems   Constitutional: Positive for appetite change and diaphoresis  Cardiovascular: Positive for chest pain  Gastrointestinal: Positive for abdominal pain, diarrhea and nausea  Neurological: Positive for syncope and light-headedness  All other systems reviewed and are negative        Past Medical and Surgical History:     Past Medical History:   Diagnosis Date    Abnormal liver function     Anemia     Anxiety     Arthritis     Chronic kidney disease     stage 3    Chronic narcotic dependence (HCC)     Chronic pain disorder     lower back, hands , neck and knees    Coronary artery disease     Depression     Diabetes mellitus ([facility] [address] )     Elevated troponin 2/11/2022    GERD (gastroesophageal reflux disease)     no meds at present    Heart murmur     murmur    Hyperlipidemia     Hypertension     Neurogenic bladder     Open toe wound 12/2020    right big toe open calus but is dry at present    Renal disorder     Shortness of breath     exertion    Sleep apnea     doesn't use cpap    Suprapubic catheter [facility])        Past Surgical History:   Procedure Laterality Date    AMPUTATION      ANGIOPLASTY  2017    5    BREAST EXCISIONAL BIOPSY Left     BREAST SURGERY      CARDIAC CATHETERIZATION      CARPAL TUNNEL RELEASE Bilateral     CERVICAL FUSION      HYSTERECTOMY  2008    IR SUPRAPUBIC CATHETER PLACEMENT 6/15/2021    KNEE SURGERY      OOPHORECTOMY  2008    AR EXC SKIN BENIG 3 1-4 CM REMAINDR BODY N/A 12/21/2020    Procedure: EXCISION SEBACEOUS CYST X 5 SCALP;  Surgeon: Vimal Spangler MD;  Location: [address] [place] [address] MAIN OR;  Service: General    TOE AMPUTATION Left     TRIGGER FINGER RELEASE Right     4th Finger       Meds/Allergies:    Prior to Admission medications    Medication Sig Start Date End Date Taking?  Authorizing Provider   allopurinol (ZYLOPRIM) 300 mg tablet Take 1 tablet (300 mg total) by mouth daily 3/29/22   CHERELLE Chun   amLODIPine (NORVASC) 5 mg tablet Take 1 tablet (5 mg total) by mouth daily 3/29/22   CHERELLE Chun   aspirin (ECOTRIN LOW STRENGTH) 81 mg EC tablet Take 1 tablet (81 mg total) by mouth daily 3/29/22   CHERELLE Chun   atorvastatin (LIPITOR) 40 mg tablet Take 1 tablet (40 mg total) by mouth daily 3/29/22   CHERELLE Chun   bisoprolol (ZEBETA) 5 mg tablet Take 1 tablet (5 mg total) by mouth daily 2/22/22   Chet London DO   citalopram (CeleXA) 40 mg tablet Take 1 tablet (40 mg total) by mouth daily 3/29/22   CHERELLE Chun   clopidogrel (PLAVIX) 75 mg tablet Take 1 tablet (75 mg total) by mouth daily 3/29/22   CHERELLE Chun   docusate sodium (COLACE) 100 mg capsule Take 1 capsule (100 mg total) by mouth 2 (two) times a day 3/29/22   CHERELLE Chun   Dulaglutide (Trulicity) 1 5 ER/6 6QH SOPN Inject 0 5 mL (1 5 mg total) under the skin once a week 3/29/22   CHERELLE Chun   ferrous sulfate 325 (65 Fe) mg tablet Take 1 tablet (325 mg total) by mouth every other day 3/29/22   CHERELLE Chun   gabapentin (NEURONTIN) 800 mg tablet Take 1 tablet (800 mg total) by mouth 4 (four) times a day 3/29/22   CHERELLE Chun   HYDROcodone-acetaminophen [facility]) 5-325 mg per tablet Take 1 tablet by mouth 3 (three) times a day as needed for pain For ongoing pain Max Daily Amount: 3 tablets 5/3/22   CHERELLE Chun   insulin glargine (Lantus SoloStar) 100 units/mL injection pen Inject 50 Units under the skin every 12 (twelve) hours 5/3/22   CHERELLE Marlow   insulin lispro (HumaLOG) 100 units/mL injection pen Inject 20 Units under the skin 3 (three) times a day with meals 3/29/22   CHERELLE Marlow   Insulin Pen Needle (BD Pen Needle Micro U/F) 32G X 6 MM MISC Use 5 (five) times a day 3/29/22   CHERELLE Marlow   Insulin Syringe-Needle U-100 (BD Veo Insulin Syringe U/F) 31G X 15/64" 0 5 ML MISC Inject under the skin 3 (three) times a day 3/29/22   CHERELLE Marlow   isosorbide mononitrate (IMDUR) 60 mg 24 hr tablet TAKE 1 TABLET BY MOUTH EVERY DAY 3/1/22   Sheri Martin DO   Lancets (OneTouch Delica Plus [id number]) MISC USE TO TEST 3 TIMES DAILY 3/10/21   CHERELLE Osorio   levothyroxine 50 mcg tablet TAKE 1 TABLET BY MOUTH EVERY DAY 2/4/22   CHERELLE Marlow   loperamide (IMODIUM) 2 mg capsule Take 1 capsule (2 mg total) by mouth 4 (four) times a day as needed for diarrhea 6/19/22   DO lata Olivera [facility]) 4 mg/0 1 mL nasal spray Administer 1 spray into a nostril  If breathing does not return to normal or if breathing difficulty resumes after 2-3 minutes, give another dose in the other nostril using a new spray   3/10/21   CHERELLE Osorio   nitroglycerin (NITROSTAT) 0 4 mg SL tablet Place 1 tablet (0 4 mg total) under the tongue every 5 (five) minutes as needed for chest pain 4/26/21   Sheri Martin DO   nystatin (MYCOSTATIN) powder Apply topically 2 (two) times a day 9/10/21   Tim Valverde PA-C   OLANZapine (ZyPREXA) 5 mg tablet Take 1 tablet (5 mg total) by mouth daily at bedtime 3/29/22   CHERELLE Marlow   ondansetron [facility] 4 mg tablet Take 1 tablet (4 mg total) by mouth every 8 (eight) hours as needed for nausea or vomiting 12/16/21   CHERELLE Marlow   ondansetron (ZOFRAN-ODT) 4 mg disintegrating tablet Take 1 tablet (4 mg total) by mouth every 8 (eight) hours as needed for nausea or vomiting 6/19/22   Jordynine Nap, DO   OneTouch Ultra test strip USE 1 EACH DAILY USE AS INSTRUCTED 2/21/22   CHERELLE Ellis   pantoprazole (PROTONIX) 40 mg tablet TAKE 1 TABLET BY MOUTH TWICE A DAY 2/21/22   Yaritza Wood MD   polyethylene glycol [facility]) 17 GM/SCOOP powder Take 17 g by mouth daily 3/29/22   CHERELLE Ellis   tiZANidine (ZANAFLEX) 4 mg tablet TAKE 1 TABLET (4 MG TOTAL) BY MOUTH EVERY 8 (EIGHT) HOURS AS NEEDED FOR MUSCLE SPASMS 5/27/22   CHERELLE Ellis   torsemide (DEMADEX) 20 mg tablet Take 2 tablets (40 mg total) by mouth daily 3/16/22 4/15/22  CHERELLE Ellis   oxygen gas Inhale 2 L/min continuous  Indications: Respiratory Failure 1/1/20 6/11/22  Historical Provider, MD     I have reviewed home medications with patient personally  Allergies:    Allergies   Allergen Reactions    Codeine      Other reaction(s): Nausea and Vomiting    Latex Itching       Social History:     Marital Status:    Occupation:   Patient Pre-hospital Living Situation:   Patient Pre-hospital Level of Mobility:   Patient Pre-hospital Diet Restrictions:   Substance Use History:   Social History     Substance and Sexual Activity   Alcohol Use Not Currently     Social History     Tobacco Use   Smoking Status Former Smoker    Packs/day: 1 00    Years: 35 00    Pack years: 35 00    Types: Cigarettes    Quit date: 2012    Years since quitting: 10 4   Smokeless Tobacco Never Used     Social History     Substance and Sexual Activity   Drug Use Never       Family History:    Family History   Problem Relation Age of Onset    Stroke Father     Heart disease Father     No Known Problems Mother     No Known Problems Sister     No Known Problems Daughter     No Known Problems Maternal Grandmother     No Known Problems Maternal Grandfather     No Known Problems Paternal Grandmother     No Known Problems Paternal Grandfather     No Known Problems Maternal Aunt     No Known Problems Maternal Aunt     No Known Problems Maternal Aunt     No Known Problems Maternal Aunt     No Known Problems Maternal Aunt     No Known Problems Maternal Aunt     No Known Problems Paternal Aunt        Physical Exam:     Vitals:   Blood Pressure: 101/57 (06/22/22 0230)  Pulse: (!) 49 (06/22/22 0049)  Temperature: 97 7 °F (36 5 °C) (06/21/22 2225)  Temp Source: Oral (06/21/22 2225)  Respirations: 16 (06/22/22 0049)  Height: 5' 8" (172 7 cm) (06/21/22 2225)  Weight - Scale: 130 kg (286 lb 9 6 oz) (06/21/22 2225)  SpO2: 94 % (06/22/22 0049)    Physical Exam  Vitals reviewed  Constitutional:       Appearance: She is obese  She is ill-appearing  HENT:      Head: Normocephalic and atraumatic  Right Ear: External ear normal       Left Ear: External ear normal       Nose: Nose normal    Eyes:      Extraocular Movements: Extraocular movements intact  Cardiovascular:      Rate and Rhythm: Regular rhythm  Bradycardia present  Pulmonary:      Breath sounds: No wheezing, rhonchi or rales  Abdominal:      General: There is no distension  Palpations: Abdomen is soft  There is no mass  Tenderness: There is no guarding or rebound  Hernia: No hernia is present  Musculoskeletal:      Cervical back: Normal range of motion  Right lower leg: No edema  Left lower leg: No edema  Skin:     General: Skin is warm and dry  Neurological:      Mental Status: She is alert  Mental status is at baseline  (  Be Sure to Include Physical Exam: Delete this entire line when you have entered your exam)    Additional Data:     Lab Results: I have personally reviewed pertinent reports        Results from last 7 days   Lab Units 06/21/22  2249   WBC Thousand/uL 13 06*   HEMOGLOBIN g/dL 8 4*   HEMATOCRIT % 26 3*   PLATELETS Thousands/uL 276   NEUTROS PCT % 70   LYMPHS PCT % 19   MONOS PCT % 8   EOS PCT % 1     Results from last 7 days   Lab Units 06/21/22  2249   SODIUM mmol/L 129*   POTASSIUM mmol/L 4  2   CHLORIDE mmol/L 96*   CO2 mmol/L 24   BUN mg/dL 78*   CREATININE mg/dL 4 23*   ANION GAP mmol/L 9   CALCIUM mg/dL 7 9*   ALBUMIN g/dL 2 6*   TOTAL BILIRUBIN mg/dL 0 24   ALK PHOS U/L 103   ALT U/L 110*   AST U/L 92*   GLUCOSE RANDOM mg/dL 111     Results from last 7 days   Lab Units 06/22/22  0027   INR  1 21*     Results from last 7 days   Lab Units 06/21/22  2227   POC GLUCOSE mg/dl 165*               Imaging: I have personally reviewed pertinent reports  Cardiomegaly  Questionable RLL central congestion  XR chest portable    (Results Pending)       EKG, Pathology, and Other Studies Reviewed on Admission:   · EKG:     Allscripts / Epic Records Reviewed: Yes    ** Please Note: This note has been constructed using a voice recognition system   **

## 2022-06-22 NOTE — ASSESSMENT & PLAN NOTE
Possibly due to acute illness and hypoalbuminemia although pt on antihypertensives prehospital   Given elevated troponin I monitor volume status closely  Hold oral antihypertensives  Start ivf, q4h vs   Low threshold for higher level of care if recurrence or further cp

## 2022-06-22 NOTE — PROGRESS NOTES
[facility]  Progress Note - Annmarie Anne 1960, 63 y o  female MRN: [id number]  Unit/Bed#: ED 17 Encounter: [id number]  Primary Care Provider: CHERELLE Caraballo   Date and time admitted to hospital: 6/21/2022 10:21 PM    * Syncope  Assessment & Plan  Witnessed 2* hypotension of unclear etiology  Monitor on telemetry, ivf treat joao and elevated troponin I    Gastroenteritis  Assessment & Plan  Recent nausea and diarrheaand decreased appetite  Likely etiology for joao and possibly hypotension  Supportive care, monitor off abx    Hypotension  Assessment & Plan  Possibly due to acute illness and hypoalbuminemia although pt on antihypertensives prehospital   Given elevated troponin I monitor volume status closely  Hold oral antihypertensives  Start ivf, q4h vs   Low threshold for higher level of care if recurrence or further cp    Elevated troponin I level  Assessment & Plan  Pt did have cp earlier and was seen in ed for n/v on 6/19/22 as well as diarrhea  Does have hx of cardiac stenting however  Cp now improved troponin I in setting of joao hypotension and acute illness 9300 with delta of 8500  S/p asa load heparin gtt in ed  ivf consult cardiology and nephrology given significant jooa as will likely need optimization if invasive intervention pursued    Chest pain  Assessment & Plan  Poorly described in setting of abd cramping n/v/d    Occurred in AM of day of admission now improved  Monitor for recurrence in setting of troponin elevation and known cad    Acute renal failure superimposed on chronic kidney disease [facility])  Assessment & Plan  Lab Results   Component Value Date    EGFR 10 06/22/2022    EGFR 10 06/21/2022    EGFR 24 06/19/2022    CREATININE 4 37 (H) 06/22/2022    CREATININE 4 23 (H) 06/21/2022    CREATININE 2 14 (H) 06/19/2022   likely 2* n/v/d and decreased po intake  Check pvr I/os and ua  Hold nephrotoxins  Consul nephrology given cp and coordination of any further diagnostics of elevated troponin w/cards    Chronic combined systolic and diastolic CHF (congestive heart failure) (HCC)  Assessment & Plan  Wt Readings from Last 3 Encounters:   22 130 kg (286 lb 9 6 oz)   22 128 kg (282 lb)   22 128 kg (282 lb)         Continue home torsemide    CAD (coronary artery disease)  Assessment & Plan  Hx of stenting in [place] on asa/statin/plavix w/100% chronic RCA total occlusion on catheterization in   Hold bb given hypotension  Continue op regimen  Consult cardiology    Type 2 diabetes mellitus with stage 4 chronic kidney disease, with long-term current use of insulin [facility])  Assessment & Plan  Lab Results   Component Value Date    HGBA1C 7 3 (A) 2022       Recent Labs     227 22  0746   POCGLU 165* 85       Blood Sugar Average: Last 72 hrs:  (P) 125   On lantus 50 iu bid and scheduled humalog  Hold scheduled humalog reduce lantus given acute illness top 20 iu bid and start ssi/poc bs    VTE Prophylaxis:  Heparin    Patient Centered Rounds: I have performed bedside rounds with nursing staff today      Discussions with Specialists or Other Care Team Provider:  Cm and nursing  Education and Discussions with Family / Patient:  With patient, patient declined cold family members    Current Length of Stay: 0 day(s)    Current Patient Status: Inpatient   Certification Statement: The patient will continue to require additional inpatient hospital stay due to Pending cardiology nephrology consult, IV fluids    Discharge Plan:  More than 48 hours    Code Status: Level 1 - Full Code    Subjective:   Patient seen in consult for no chest pain shortness of breath, no abdominal    Objective:     Vitals:   Temp (24hrs), Av 7 °F (36 5 °C), Min:97 7 °F (36 5 °C), Max:97 7 °F (36 5 °C)    Temp:  [97 7 °F (36 5 °C)] 97 7 °F (36 5 °C)  HR:  [46-49] 47  Resp:  [14-18] 16  BP: ()/(51-89) 100/59  SpO2:  [94 %-96 %] 95 %  Body mass index is 43 58 kg/m²  Input and Output Summary (last 24 hours): Intake/Output Summary (Last 24 hours) at 6/22/2022 0910  Last data filed at 6/22/2022 0813  Gross per 24 hour   Intake --   Output 550 ml   Net -550 ml       Physical Exam:   Physical Exam  Vitals and nursing note reviewed  Constitutional:       General: She is not in acute distress  Appearance: She is obese  HENT:      Head: Normocephalic  Nose: Nose normal       Mouth/Throat:      Pharynx: Oropharynx is clear  Eyes:      Conjunctiva/sclera: Conjunctivae normal    Cardiovascular:      Rate and Rhythm: Normal rate and regular rhythm  Heart sounds: No murmur heard  Pulmonary:      Effort: Pulmonary effort is normal  No respiratory distress  Breath sounds: Normal breath sounds  No wheezing  Abdominal:      General: There is no distension  Tenderness: There is no abdominal tenderness  There is no guarding  Musculoskeletal:         General: No swelling  Normal range of motion  Right lower leg: No edema  Skin:     General: Skin is warm  Capillary Refill: Capillary refill takes less than 2 seconds  Neurological:      General: No focal deficit present  Mental Status: She is alert and oriented to person, place, and time  Cranial Nerves: No cranial nerve deficit     Psychiatric:         Mood and Affect: Mood normal          Additional Data:     Labs:    Results from last 7 days   Lab Units 06/22/22  0637   WBC Thousand/uL 12 97*   HEMOGLOBIN g/dL 8 1*   HEMATOCRIT % 24 8*   PLATELETS Thousands/uL 265   NEUTROS PCT % 62   LYMPHS PCT % 26   MONOS PCT % 8   EOS PCT % 2     Results from last 7 days   Lab Units 06/22/22  0637   SODIUM mmol/L 130*   POTASSIUM mmol/L 4 2   CHLORIDE mmol/L 97*   CO2 mmol/L 22   BUN mg/dL 81*   CREATININE mg/dL 4 37*   CALCIUM mg/dL 7 9*   ALK PHOS U/L 100   ALT U/L 137*   AST U/L 110*     Results from last 7 days   Lab Units 06/22/22  0027   INR  1 21*     Results from last 7 days Lab Units 06/22/22  0746 06/21/22  2227   POC GLUCOSE mg/dl 85 165*           * I Have Reviewed All Lab Data Listed Above  * Additional Pertinent Lab Tests Reviewed: [facility] Admission  Reviewed    Imaging:  Imaging Reports Reviewed Today Include:  Chest x-ray    Recent Cultures (last 7 days):           Last 24 Hours Medication List:   Current Facility-Administered Medications   Medication Dose Route Frequency Provider Last Rate    allopurinol  300 mg Oral Daily Debi House PA-C      atorvastatin  40 mg Oral Daily With SETH Sy      citalopram  40 mg Oral Daily Debi House PA-C      clopidogrel  75 mg Oral Daily Debi House PA-C      heparin (porcine)  3-20 Units/kg/hr (Order-Specific) Intravenous Titrated Debi House PA-C 13 1 Units/kg/hr (06/22/22 0747)    heparin (porcine)  2,000 Units Intravenous Q1H PRN Debi House PA-C      heparin (porcine)  4,000 Units Intravenous Q1H PRN Debi House PA-C      insulin glargine  25 Units Subcutaneous Q12H [facility] Debi House PA-C      insulin lispro  1-6 Units Subcutaneous 4x Daily (AC & HS) Debi House PA-C      isosorbide mononitrate  60 mg Oral Daily Debi House PA-C      levothyroxine  50 mcg Oral Early Morning Debi House PA-C      OLANZapine  5 mg Oral HS Debi House PA-C      ondansetron  4 mg Intravenous Q6H PRN Debi House PA-C      sodium chloride  100 mL/hr Intravenous Continuous Debi House PA-C 100 mL/hr (06/22/22 2182)        Today, Patient Was Seen By: Rudy Oliveros MD    ** Please Note: Dictation voice to text software may have been used in the creation of this document   **

## 2022-06-22 NOTE — ASSESSMENT & PLAN NOTE
Recent nausea and diarrheaand decreased appetite    Likely etiology for joao and possibly hypotension  Supportive care, monitor off abx

## 2022-06-22 NOTE — ASSESSMENT & PLAN NOTE
Pt did have cp earlier and was seen in ed for n/v on 6/19/22 as well as diarrhea  Does have hx of cardiac stenting however  Cp now improved troponin I in setting of joao hypotension and acute illness 9300 with delta of 8500  S/p asa load heparin gtt in ed    ivf consult cardiology and nephrology given significant joao as will likely need optimization if invasive intervention pursued

## 2022-06-22 NOTE — ASSESSMENT & PLAN NOTE
Poorly described in setting of abd cramping n/v/d    Occurred in AM of day of admission now improved  Monitor for recurrence in setting of troponin elevation and known cad

## 2022-06-22 NOTE — CONSULTS
Consult - Cardiology   Max Mitchell 63 y o  female MRN: [id number]  Unit/Bed#: ED 17 Encounter: [id number]        Reason For Consult:  Abnormal troponin                 Assessment:  Nausea, diarrhea, anorexia - probable gastroenteritis  CKD, ALFRED POA   Baseline creatinine looksto be around 2 3-2 5    Presenting creatinine 4 2 with most recent prior value of 2 14 (6/19/2022)  Near-syncope:  History suggests brief orthostatic hypotension  Hx HTN: now with low normal to hypotensive trend   O/p Rx:  Norvasc 5 mg daily, bisoprolol 5 mg daily, torsemide 40 mg daily, isosorbide mononitrate 60 mg daily  Elevated troponin   HS troponin 9361,  8542,  7903  Chest discomfort  CAD   Hx PCI  W/ stents x5 LAD & circumflex vessels   [facility] 8/21:  pRCA chronic total occlusion, patent stents in the LAD and circumflex  HFmEF:  Currently seems compensated   Echo 8/24/2021 LVEF 45% - RWMA:  Severe hypokinesia of basal-mid anteroseptal and basal-mid inferoseptal walls, moderate hypokinesia basal-mid inferior wall  Dyslipidemia  Chronic anemia   Baseline hemoglobin low-mid 8s  Other    * Diabetes    * Polycystic ovarian syndrome    * Fibromyalgia    * History neurogenic bladder, suprapubic catheter    * Obstructive sleep apnea:  Patient reports positive testing while living in [place] - though she does not use CPAP     Discussion / Plan:  #  patient presents with signs of ALFRED with pre renal pattern in the setting of signs of gastroenteritis with volume loss  #  abnormal high sensitivity troponin without angina and nondiagnostic ECG (left bundle branch block) with suspicion for noncardiac MI though troponin level is somewhat disproportionate to presentation - likely in part due to decreased renal clearance  #  sinus bradycardia present on arrival with history of same though presently more pronounced and seemingly asymptomatic           #  reports of near-syncope prior to admission with clinical scenario and objective data suggesting probable orthostasis     No recommendation for anticoagulation at this point  · Check echocardiogram for reassessment of LVEF, possible new wall motion abnormalities  · Further recommendations regarding ischemic testing guided by the above  · For dyslipidemia and CAD continue pre-hospital dose of Lipitor  · Agree with holding of pre-hospital antihypertensives at this point ~~> will follow trend possibly resuming medications in stepwise fashion  · Presently without absolute need for pacemaker noting asymptomatic sinus bradycardia ~~> however with bradyarrhythmia, left bundle branch block, and other need for beta-blocker candidacy could be considered with further comment forthcoming guided by her clinical course  History Of Present Illness:  Stacey Ross is 65-year-old patient of [place] cardiologist Dr Luis Burks with medical problems as highlighted above  In February of this year she was hospitalized for some decompensated CHF and was last seen in our office on 4/13/2022  At that time the patient felt she was doing overall well and at her baseline denying any chest pain  She did have some mild lower extremity edema which is chronic  She was normotensive (134/66) with rate recorded as 128 kg/282 lb  On 6/19/2022 Ms Lowell Lopez was seen at the [facility] Emergency Department reporting a 1 week history of GI symptoms including anorexia, nausea, diarrhea with decreased oral intake  There chemistry showed normal electrolyte levels with BUN of 44 and creatinine of 2 14  COVID and influenza PCR test were negative  Personal review of vital signs from that encounter note her to have been hypertensive with systolic pressure in the 772Z though it is reported that she had not taken her antihypertensives before coming to the emergency department  She reportedly had an unremarkable ECG and no troponin levels were checked    She was given Reglan and IV fluids and later discharged to home with a prescription for Zofran and loperamide  In the aftermath of her ED visit and use of Zofran and loperamide the patient states she did feel some improvement in her nausea though she continued to be anorexic with limited intake of solids though she did feel she was doing a good job of drinking liquids  Stooling had decreased to 1-2 per day    Yesterday the patient felt generally weak with a headache and some occasional feelings of modest chest discomfort which he described as a sharp or pinching sensation lasting only seconds and not clearly reminiscent of prior angina  She denied any perception of cardiac ectopy/tachycardia  Last evening after taking her usual medications she states she felt high reminiscent of when she had previously smoked marijuana  With this she had some increased feelings of lightheadedness without any other acute symptoms  At that point her daughter checked her blood pressure which she noted to be slightly below her baseline though not particularly alarming  She was also noted to be bradycardic with heart rate apparently in the 40s which is not like her norm  Because of these, and at the recommendation of her daughter, an ambulance was called  When the ambulance arrived the patient was seated on the edge of the bed  She then stood and was being assisted by them having only made it a step or 2 away from the bed when she felt acutely lightheaded so she was again placed back on the edge of the bed and a seated position  They then stood her for a 2nd time to lay her on a sling they had placed on the ground  With this 2nd attempted standing she became acutely lightheaded for which she was eased to the ground possibly with near-syncope or brief syncope  She describes rapid return of lucidity and no other care being administered by EMS  She was then brought to the hospital for further evaluation      Since her time in the emergency department systolic blood pressures have ranged   Heart rate has been consistently in the upper 40s-low 50s with normal room air saturations  Chemistry shows signs of ALFRED with presenting BUN and creatinine respectively 78 and 4 23  Serial ECG showed sinus bradycardia with left bundle branch block  High sensitivity troponin levels are abnormal with initial value of 9361, then 8542, 7903  She has had no other chest discomfort aside from the atypical symptoms reported above    NT proBNP has not been obtained        Past Medical History:        Past Medical History:   Diagnosis Date    Abnormal liver function     Anemia     Anxiety     Arthritis     Chronic kidney disease     stage 3    Chronic narcotic dependence (HCC)     Chronic pain disorder     lower back, hands , neck and knees    Coronary artery disease     Depression     Diabetes mellitus ([facility] [address] )     Elevated troponin 2/11/2022    GERD (gastroesophageal reflux disease)     no meds at present    Heart murmur     murmur    Hyperlipidemia     Hypertension     Neurogenic bladder     Open toe wound 12/2020    right big toe open calus but is dry at present    Renal disorder     Shortness of breath     exertion    Sleep apnea     doesn't use cpap    Suprapubic catheter [facility])       Past Surgical History:   Procedure Laterality Date    AMPUTATION      ANGIOPLASTY  2017    5    BREAST EXCISIONAL BIOPSY Left     BREAST SURGERY      CARDIAC CATHETERIZATION      CARPAL TUNNEL RELEASE Bilateral     CERVICAL FUSION      HYSTERECTOMY  2008    IR SUPRAPUBIC CATHETER PLACEMENT  6/15/2021    KNEE SURGERY      OOPHORECTOMY  2008    KY EXC SKIN BENIG 3 1-4 CM REMAINDR BODY N/A 12/21/2020    Procedure: EXCISION SEBACEOUS CYST X 5 SCALP;  Surgeon: Oanh Junior MD;  Location:  MAIN OR;  Service: General    TOE AMPUTATION Left     TRIGGER FINGER RELEASE Right     4th Finger        Allergy:        Allergies   Allergen Reactions    Codeine      Other reaction(s): Nausea and Vomiting    Latex Itching       Medications:       Prior to Admission medications    Medication Sig Start Date End Date Taking?  Authorizing Provider   allopurinol (ZYLOPRIM) 300 mg tablet Take 1 tablet (300 mg total) by mouth daily 3/29/22   Tanvir Carter, CHERELLE   amLODIPine (NORVASC) 5 mg tablet Take 1 tablet (5 mg total) by mouth daily 3/29/22   Tanvir Carter, CHERELLE   aspirin (ECOTRIN LOW STRENGTH) 81 mg EC tablet Take 1 tablet (81 mg total) by mouth daily 3/29/22   Tanvir Carter, CHERELLE   atorvastatin (LIPITOR) 40 mg tablet Take 1 tablet (40 mg total) by mouth daily 3/29/22   Tanvir Carter, CHERELLE   bisoprolol (ZEBETA) 5 mg tablet Take 1 tablet (5 mg total) by mouth daily 2/22/22   Jeremie Silva DO   citalopram (CeleXA) 40 mg tablet Take 1 tablet (40 mg total) by mouth daily 3/29/22   Tanvir Carter, CHERELLE   clopidogrel (PLAVIX) 75 mg tablet Take 1 tablet (75 mg total) by mouth daily 3/29/22   CHERELLE Dumont   docusate sodium (COLACE) 100 mg capsule Take 1 capsule (100 mg total) by mouth 2 (two) times a day 3/29/22   CHERELLE Dumont   Dulaglutide (Trulicity) 1 5 MS/2 2AH SOPN Inject 0 5 mL (1 5 mg total) under the skin once a week 3/29/22   CHERELLE Dumont   ferrous sulfate 325 (65 Fe) mg tablet Take 1 tablet (325 mg total) by mouth every other day 3/29/22   CHERELLE Dumont   gabapentin (NEURONTIN) 800 mg tablet Take 1 tablet (800 mg total) by mouth 4 (four) times a day 3/29/22   CHERELLE Dumont   HYDROcodone-acetaminophen (NORCO) 5-325 mg per tablet Take 1 tablet by mouth 3 (three) times a day as needed for pain For ongoing pain Max Daily Amount: 3 tablets 5/3/22   CHERELLE Dumont   insulin glargine (Lantus SoloStar) 100 units/mL injection pen Inject 50 Units under the skin every 12 (twelve) hours 5/3/22   CHERELLE Dumont   insulin lispro (HumaLOG) 100 units/mL injection pen Inject 20 Units under the skin 3 (three) times a day with meals 3/29/22   CHERELLE Dumont   Insulin Tenzin Needle (BD Pen Needle Micro U/F) 32G X 6 MM MISC Use 5 (five) times a day 3/29/22   CHERELLE Ontiveros   Insulin Syringe-Needle U-100 (BD Veo Insulin Syringe U/F) 31G X 15/64" 0 5 ML MISC Inject under the skin 3 (three) times a day 3/29/22   CHERELLE Ontiveros   isosorbide mononitrate (IMDUR) 60 mg 24 hr tablet TAKE 1 TABLET BY MOUTH EVERY DAY 3/1/22   Vaishali Kelley DO   Lancets (OneTouch Delica Plus [id number]) MISC USE TO TEST 3 TIMES DAILY 3/10/21   CHERELLE Barroso   levothyroxine 50 mcg tablet TAKE 1 TABLET BY MOUTH EVERY DAY 2/4/22   CHERELLE Ontiveros   loperamide (IMODIUM) 2 mg capsule Take 1 capsule (2 mg total) by mouth 4 (four) times a day as needed for diarrhea 6/19/22   Kim Han DO   naloxone [facility]) 4 mg/0 1 mL nasal spray Administer 1 spray into a nostril  If breathing does not return to normal or if breathing difficulty resumes after 2-3 minutes, give another dose in the other nostril using a new spray   3/10/21   CHERELLE Barroso   nitroglycerin (NITROSTAT) 0 4 mg SL tablet Place 1 tablet (0 4 mg total) under the tongue every 5 (five) minutes as needed for chest pain 4/26/21   Vaishali Kelley DO   nystatin (MYCOSTATIN) powder Apply topically 2 (two) times a day 9/10/21   Tim Valverde PA-C   OLANZapine (ZyPREXA) 5 mg tablet Take 1 tablet (5 mg total) by mouth daily at bedtime 3/29/22   CHERELLE Ontiveros   ondansetron [facility]) 4 mg tablet Take 1 tablet (4 mg total) by mouth every 8 (eight) hours as needed for nausea or vomiting 12/16/21   CHERELLE Ontiveros   ondansetron (ZOFRAN-ODT) 4 mg disintegrating tablet Take 1 tablet (4 mg total) by mouth every 8 (eight) hours as needed for nausea or vomiting 6/19/22   Kim Han DO   OneTouch Ultra test strip USE 1 EACH DAILY USE AS INSTRUCTED 2/21/22   CHERELLE Ontiveros   pantoprazole (PROTONIX) 40 mg tablet TAKE 1 TABLET BY MOUTH TWICE A DAY 2/21/22   Kami Smith MD   polyethylene glycol [facility]) 17 GM/SCOOP powder Take 17 g by mouth daily 3/29/22   CHERELLE Elkins   tiZANidine (ZANAFLEX) 4 mg tablet TAKE 1 TABLET (4 MG TOTAL) BY MOUTH EVERY 8 (EIGHT) HOURS AS NEEDED FOR MUSCLE SPASMS 5/27/22   CHERELLE Elkins   torsemide [facility]) 20 mg tablet Take 2 tablets (40 mg total) by mouth daily 3/16/22 4/15/22  CHERELLE Elkins   oxygen gas Inhale 2 L/min continuous   Indications: Respiratory Failure 1/1/20 6/11/22  Historical Provider, MD       Family History:     Family History   Problem Relation Age of Onset    Stroke Father     Heart disease Father     No Known Problems Mother     No Known Problems Sister     No Known Problems Daughter     No Known Problems Maternal Grandmother     No Known Problems Maternal Grandfather     No Known Problems Paternal Grandmother     No Known Problems Paternal Grandfather     No Known Problems Maternal Aunt     No Known Problems Maternal Aunt     No Known Problems Maternal Aunt     No Known Problems Maternal Aunt     No Known Problems Maternal Aunt     No Known Problems Maternal Aunt     No Known Problems Paternal Aunt         Social History:       Social History     Socioeconomic History    Marital status:      Spouse name: None    Number of children: None    Years of education: None    Highest education level: None   Occupational History    None   Tobacco Use    Smoking status: Former Smoker     Packs/day: 1 00     Years: 35 00     Pack years: 35 00     Types: Cigarettes     Quit date: 2012     Years since quitting: 10 4    Smokeless tobacco: Never Used   Vaping Use    Vaping Use: Never used   Substance and Sexual Activity    Alcohol use: Not Currently    Drug use: Never    Sexual activity: Not Currently   Other Topics Concern    None   Social History Narrative    None     Social Determinants of Health     Financial Resource Strain: Low Risk     Difficulty of Paying Living Expenses: Not hard at all   Food Insecurity: No Food Insecurity    Worried About [facility] in the Last Year: Never true    [place] of Food in the Last Year: Never true   Transportation Needs: No Transportation Needs    Lack of Transportation (Medical): No    Lack of Transportation (Non-Medical): No   Physical Activity: Not on file   Stress: Not on file   Social Connections: Not on file   Intimate Partner Violence: Not on file   Housing Stability: Low Risk     Unable to Pay for Housing in the Last Year: No    Number of Places Lived in the Last Year: 1    Unstable Housing in the Last Year: No       ROS:  Symptoms per HPI  Typically ambulates with a cane> walker  At baseline she can probably walk a few 100 ft but greater distances are problematic and for which she usually uses a motorized scooter when out of her residence  She can slowly ascending a flight of steps  The remainder of the review of systems is negative    Exam:  General:  Alert, pleasant, normally conversant, and appears overall comfortable though looks to have a headache and is bothered by some photophobia  Head: Normocephalic, atraumatic  Eyes:  EOMI  Pupils - equal, round, reactive to accomodation  No icterus  Normal Conjunctiva  Oropharynx: Moist without lesion  Neck:  No gross bruit, JVD, thyromegaly, or lymphadenopathy  Heart:  Regular with controlled rate  No rub nor pathologic murmur  Lungs:  Clear without rales/rhonchi/wheeze  Abdomen:  Soft and nontender with normal bowel sounds  No organomegaly or mass  Lower Limbs:  No edema  Pulses[de-identified]  RLE - DP:  2+                 LLE - DP:  2+  Musculoskeletal: Independent movement of limbs observed, Formal ROM and strength eval not performed  Neurologic:    Oriented to: person, place, situation  Cranial Nerves: grossly intact - vision, smell, taste, and hearing were not tested       Motor function: grossly normal, symmetric   Sensation: Was not tested    Vitals:    06/22/22 0351 06/22/22 0500 06/22/22 0624 06/22/22 0750   BP: 106/54 104/60 128/62 100/59   BP Location: Right arm Right arm Right arm Right arm   Pulse: (!) 48 (!) 46 (!) 48 (!) 47   Resp: 15 14 16 16   Temp:    97 7 °F (36 5 °C)   TempSrc:    Oral   SpO2: 96% 94% 94% 95%   Weight:       Height:               DATA:      -----------    ECG:                      -----------------------------------------------------------------------------------------------------------------------------------------------  Telemetry:   Normal sinus rhythm with ventricular rate trend mid 40s-mid 50s           -----------------------------------------------------------------------------------------------------------------------------------------------  Echocardiogram    -----------------------------------------------------------------------------------------------------------------------------------------------  Ischemic Testing:  Catheterization, 8/31/2021, ANTOINE  HEMODYNAMICS: Hemodynamic assessment demonstrated normal LVEDP  12-14     CORONARY VESSELS:     --  The coronary circulation is left dominant  --  Left main: The vessel was medium to large sized  Angiography showed mild atherosclerosis  --  LAD: The vessel was large sized  Angiography showed no evidence of disease  --  Proximal LAD: There was a discrete 30 % stenosis at the site of a prior stent  --  Mid LAD: There was a 0 % stenosis at the site of a prior stent  --  Distal LAD: There was a 0 % stenosis at the site of a prior stent  --  Proximal circumflex: There was a 0 % stenosis at the site of a prior stent  --  RCA: The vessel was small (non-dominant)  --  Proximal RCA: There was a 100 % stenosis   This lesion is a chronic total occlusion      IMPRESSIONS:  Type 2 MI  There is significant single vessel coronary artery disease      RECOMMENDATIONS  The patient should continue with the present medications          -----------------------------------------------------------------------------------------------------------------------------------------------  Weights: Wt Readings from Last 20 Encounters:   06/21/22 130 kg (286 lb 9 6 oz)   06/19/22 128 kg (282 lb)   04/13/22 128 kg (282 lb)   03/29/22 128 kg (281 lb 11 2 oz)   03/04/22 132 kg (290 lb)   02/22/22 126 kg (278 lb 12 8 oz)   02/22/22 126 kg (277 lb)   02/16/22 125 kg (276 lb 7 3 oz)   02/10/22 (!) 137 kg (302 lb)   12/16/21 125 kg (276 lb 4 8 oz)   12/15/21 123 kg (272 lb)   10/14/21 129 kg (284 lb)   10/13/21 129 kg (285 lb)   10/06/21 128 kg (283 lb)   09/15/21 132 kg (291 lb 6 4 oz)   09/14/21 133 kg (294 lb)   09/10/21 134 kg (294 lb 6 4 oz)   09/01/21 127 kg (279 lb 12 2 oz)   08/27/21 133 kg (292 lb 15 9 oz)   08/03/21 132 kg (290 lb)   , Body mass index is 43 58 kg/m²           Lab Studies:    Results from last 7 days   Lab Units 06/21/22 2249   CK TOTAL U/L 378*   CK MB INDEX % 4 3*            Results from last 7 days   Lab Units 06/22/22  0637 06/21/22  2249 06/19/22  0315   WBC Thousand/uL 12 97* 13 06* 11 04*   HEMOGLOBIN g/dL 8 1* 8 4* 9 7*   HEMATOCRIT % 24 8* 26 3* 30 5*   PLATELETS Thousands/uL 265 276 216   ,   Results from last 7 days   Lab Units 06/22/22  0637 06/21/22  2249 06/19/22  0315   POTASSIUM mmol/L 4 2 4 2 4 9   CHLORIDE mmol/L 97* 96* 109*   CO2 mmol/L 22 24 22   BUN mg/dL 81* 78* 44*   CREATININE mg/dL 4 37* 4 23* 2 14*   CALCIUM mg/dL 7 9* 7 9* 8 7   ALK PHOS U/L 100 103 93   ALT U/L 137* 110* 21   AST U/L 110* 92* 52*

## 2022-06-22 NOTE — ASSESSMENT & PLAN NOTE
Lab Results   Component Value Date    EGFR 10 06/21/2022    EGFR 24 06/19/2022    EGFR 13 02/24/2022    CREATININE 4 23 (H) 06/21/2022    CREATININE 2 14 (H) 06/19/2022    CREATININE 3 47 (H) 02/24/2022   likely 2* n/v/d and decreased po intake  Check pvr I/os and ua  Hold nephrotoxins  Consul nephrology given cp and coordination of any further diagnostics of elevated troponin w/cards

## 2022-06-22 NOTE — ASSESSMENT & PLAN NOTE
Witnessed 2* hypotension of unclear etiology      Monitor on telemetry, ivf treat joao and elevated troponin I

## 2022-06-22 NOTE — ASSESSMENT & PLAN NOTE
Wt Readings from Last 3 Encounters:   06/21/22 130 kg (286 lb 9 6 oz)   06/19/22 128 kg (282 lb)   04/13/22 128 kg (282 lb)         Continue home torsemide

## 2022-06-22 NOTE — ASSESSMENT & PLAN NOTE
Lab Results   Component Value Date    HGBA1C 7 3 (A) 03/29/2022       Recent Labs     06/21/22  2227   POCGLU 165*       Blood Sugar Average: Last 72 hrs:  (P) 165   On lantus 50 iu bid and scheduled humalog  Hold scheduled humalog reduce lantus given acute illness top 20 iu bid and start ssi/poc bs

## 2022-06-22 NOTE — ASSESSMENT & PLAN NOTE
Hx of stenting in [facility] on asa/statin/plavix w/100% chronic RCA total occlusion on catheterization in 08/21    Hold bb given hypotension  Continue op regimen  Consult cardiology

## 2022-06-22 NOTE — ED PROVIDER NOTES
Emergency Department Note- Angelo Aragon 63 y o  female MRN: [id number]    Unit/Bed#: ED 17 Encounter: [id number]        History of Present Illness     Patient is a 30-year-old female history of chronic indwelling Camargo catheter secondary to neurogenic bladder  She says about 1 week ago she began feeling somewhat tired, fatigued, run down, felt somewhat cold, had some nausea, feeling overall fatigued  Had some episodes of vomiting, no diarrhea  No chest pain or palpitations  Also had the gradual onset of headache  Her symptoms got a bit worse and 2 days ago was seen at an outside emergency department  CBC showed a chronic anemia, slightly improved from February 2022  CMP showed chronic renal sufficiency, creatinine of 2 14, significantly improved from 3 47 in February 2022, COVID, influenza was negative  Urinalysis obtained for the patient's Camargo catheter showed occasional bacteria, moderate epithelials, 20-30 wbc's, 20-30 rbc's  She said she felt much better after given hydration in the department as well as antiemetics  Patient was discharged home with Zofran which she has been using which has been helpful  She still feels somewhat tired weak and run down  This evening she felt very lightheaded, checked her blood pressure, thought it may have been high, she called EMS  EMS as they arrived and found the patient awake but seeming lightheaded, her blood pressure was 80 systolic, then they noticed that she was unresponsive had a syncopal episode for 1 minute  During that time there was no seizure activity  She awoke and there was no postictal state or confusion  She says now she feels a bit better but still feels fatigued overall  She does not have a headache, says her Camargo catheter was last changed 1 week ago    She denies any chest pain or palpitations, does have some mild chronic low back, hands and knee and neck pain which she says was made worse because the last several days she was unable to keep her pain medicines down so she stopped taking them  She is feeling better since the Zofran and was able to take some of her medicines  Patient denies any prolonged travel history, no recent long travel, no immobilizations, or hospitalizations        REVIEW OF SYSTEMS     Constitutional:  No recent weight  gains or losses   Eyes:  No visual changes   ENT:  No tinnitus or hearing changes   Cardiac: No chest pain or palpitations   Respiratory:  No cough or shortness of breath   Abdominal:  No nausea or vomiting   Urinary: No dysuria or hematuria   Hematologic: No easy bruising or bleeding   Skin: No rash   Musculoskeletal: As per HPI   Neurologic: As per HPI   Psychiatric: No mood changes      Historical Information   Past Medical History:   Diagnosis Date    Abnormal liver function     Anemia     Anxiety     Arthritis     Chronic kidney disease     stage 3    Chronic narcotic dependence (HCC)     Chronic pain disorder     lower back, hands , neck and knees    Coronary artery disease     Depression     Diabetes mellitus (HCC)     Elevated troponin 2/11/2022    GERD (gastroesophageal reflux disease)     no meds at present    Heart murmur     murmur    Hyperlipidemia     Hypertension     Neurogenic bladder     Open toe wound 12/2020    right big toe open calus but is dry at present    Renal disorder     Shortness of breath     exertion    Sleep apnea     doesn't use cpap    Suprapubic catheter [facility])      Past Surgical History:   Procedure Laterality Date    AMPUTATION      ANGIOPLASTY  2017    5    BREAST EXCISIONAL BIOPSY Left     BREAST SURGERY      CARDIAC CATHETERIZATION      CARPAL TUNNEL RELEASE Bilateral     CERVICAL FUSION      HYSTERECTOMY  2008    IR SUPRAPUBIC CATHETER PLACEMENT  6/15/2021    KNEE SURGERY      OOPHORECTOMY  2008    HI EXC SKIN BENIG 3 1-4 CM REMAINDR BODY N/A 12/21/2020    Procedure: EXCISION SEBACEOUS CYST X 5 SCALP;  Surgeon: Milena Moulton MD;  Location: [address] [place] [address];  Service: General TOE AMPUTATION Left     TRIGGER FINGER RELEASE Right     4th Finger     Social History   Social History     Substance and Sexual Activity   Alcohol Use Not Currently     Social History     Substance and Sexual Activity   Drug Use Never     Social History     Tobacco Use   Smoking Status Former Smoker    Packs/day: 1 00    Years: 35 00    Pack years: 35 00    Types: Cigarettes    Quit date: 2012    Years since quitting: 10 4   Smokeless Tobacco Never Used     Family History:   Family History   Problem Relation Age of Onset    Stroke Father     Heart disease Father     No Known Problems Mother     No Known Problems Sister     No Known Problems Daughter     No Known Problems Maternal Grandmother     No Known Problems Maternal Grandfather     No Known Problems Paternal Grandmother     No Known Problems Paternal Grandfather     No Known Problems Maternal Aunt     No Known Problems Maternal Aunt     No Known Problems Maternal Aunt     No Known Problems Maternal Aunt     No Known Problems Maternal Aunt     No Known Problems Maternal Aunt     No Known Problems Paternal Aunt        MEDICATIONS:  No current facility-administered medications on file prior to encounter       Current Outpatient Medications on File Prior to Encounter   Medication Sig Dispense Refill    allopurinol (ZYLOPRIM) 300 mg tablet Take 1 tablet (300 mg total) by mouth daily 90 tablet 1    amLODIPine (NORVASC) 5 mg tablet Take 1 tablet (5 mg total) by mouth daily 90 tablet 1    aspirin (ECOTRIN LOW STRENGTH) 81 mg EC tablet Take 1 tablet (81 mg total) by mouth daily 90 tablet 1    atorvastatin (LIPITOR) 40 mg tablet Take 1 tablet (40 mg total) by mouth daily 90 tablet 1    bisoprolol (ZEBETA) 5 mg tablet Take 1 tablet (5 mg total) by mouth daily 90 tablet 3    citalopram (CeleXA) 40 mg tablet Take 1 tablet (40 mg total) by mouth daily 90 tablet 1    clopidogrel (PLAVIX) 75 mg tablet Take 1 tablet (75 mg total) by mouth daily 90 tablet 1    docusate sodium (COLACE) 100 mg capsule Take 1 capsule (100 mg total) by mouth 2 (two) times a day 10 capsule 0    Dulaglutide (Trulicity) 1 5 XD/1 1AT SOPN Inject 0 5 mL (1 5 mg total) under the skin once a week 6 mL 1    ferrous sulfate 325 (65 Fe) mg tablet Take 1 tablet (325 mg total) by mouth every other day 45 tablet 1    gabapentin (NEURONTIN) 800 mg tablet Take 1 tablet (800 mg total) by mouth 4 (four) times a day 120 tablet 2    HYDROcodone-acetaminophen (NORCO) 5-325 mg per tablet Take 1 tablet by mouth 3 (three) times a day as needed for pain For ongoing pain Max Daily Amount: 3 tablets 90 tablet 0    insulin glargine (Lantus SoloStar) 100 units/mL injection pen Inject 50 Units under the skin every 12 (twelve) hours 30 mL 3    insulin lispro (HumaLOG) 100 units/mL injection pen Inject 20 Units under the skin 3 (three) times a day with meals 20 mL 2    Insulin Pen Needle (BD Pen Needle Micro U/F) 32G X 6 MM MISC Use 5 (five) times a day 200 each 5    Insulin Syringe-Needle U-100 (BD Veo Insulin Syringe U/F) 31G X 15/64" 0 5 ML MISC Inject under the skin 3 (three) times a day 100 each 2    isosorbide mononitrate (IMDUR) 60 mg 24 hr tablet TAKE 1 TABLET BY MOUTH EVERY DAY 90 tablet 2    Lancets (OneTouch Delica Plus [id number]) MISC USE TO TEST 3 TIMES DAILY 100 each 2    levothyroxine 50 mcg tablet TAKE 1 TABLET BY MOUTH EVERY DAY 60 tablet 0    loperamide (IMODIUM) 2 mg capsule Take 1 capsule (2 mg total) by mouth 4 (four) times a day as needed for diarrhea 12 capsule 0    naloxone (NARCAN) 4 mg/0 1 mL nasal spray Administer 1 spray into a nostril  If breathing does not return to normal or if breathing difficulty resumes after 2-3 minutes, give another dose in the other nostril using a new spray   1 each 1    nitroglycerin (NITROSTAT) 0 4 mg SL tablet Place 1 tablet (0 4 mg total) under the tongue every 5 (five) minutes as needed for chest pain 25 tablet 1    nystatin (MYCOSTATIN) powder Apply topically 2 (two) times a day 30 g 1    OLANZapine (ZyPREXA) 5 mg tablet Take 1 tablet (5 mg total) by mouth daily at bedtime 90 tablet 0    ondansetron (ZOFRAN) 4 mg tablet Take 1 tablet (4 mg total) by mouth every 8 (eight) hours as needed for nausea or vomiting 20 tablet 0    ondansetron (ZOFRAN-ODT) 4 mg disintegrating tablet Take 1 tablet (4 mg total) by mouth every 8 (eight) hours as needed for nausea or vomiting 20 tablet 0    OneTouch Ultra test strip USE 1 EACH DAILY USE AS INSTRUCTED 100 strip 2    pantoprazole (PROTONIX) 40 mg tablet TAKE 1 TABLET BY MOUTH TWICE A  tablet 1    polyethylene glycol (GLYCOLAX) 17 GM/SCOOP powder Take 17 g by mouth daily 255 g 1    tiZANidine (ZANAFLEX) 4 mg tablet TAKE 1 TABLET (4 MG TOTAL) BY MOUTH EVERY 8 (EIGHT) HOURS AS NEEDED FOR MUSCLE SPASMS 270 tablet 1    torsemide (DEMADEX) 20 mg tablet Take 2 tablets (40 mg total) by mouth daily 180 tablet 2    [DISCONTINUED] oxygen gas Inhale 2 L/min continuous  Indications: Respiratory Failure       ALLERGIES:  Allergies   Allergen Reactions    Codeine      Other reaction(s): Nausea and Vomiting    Latex Itching       Vitals:    06/21/22 2225 06/21/22 2328 06/22/22 0049   BP: 124/89 90/51 98/57   TempSrc: Oral     Pulse: (!) 48 (!) 48 (!) 49   Resp: 18 18 16   Patient Position - Orthostatic VS: Lying Lying Lying   Temp: 97 7 °F (36 5 °C)         PHYSICAL EXAM    General:  Patient is well-appearing  Head:  Atraumatic  Eyes:  Conjunctiva pink, PERRL, EOMI  ENT:  Mucous membranes are moist  Neck:  Supple  Cardiac:  S1-S2, without murmurs  Lungs:  Clear to auscultation bilaterally  Abdomen:  Soft, nontender, normal bowel sounds, no CVA tenderness, no tympany, no rigidity, no guarding, rectal examination performed with female technician on a as a chaperone is guaiac negative, no visible blood or melena  Extremities:  Normal range of motion, none of her joints are warm or red or swollen  No midline thoracic, lumbar, sacral tenderness, deformities, or step-offs  No bony tenderness the arms or legs, no pedal edema or calf asymmetry  Neurologic:  Awake, fluent speech, normal comprehension  AAOx3  Strength is 4/5 in the bilateral arms and legs, normal sensation of the whole body  No slurred speech or facial droop  Cranial nerves 2-12 are intact  No pronator drift or deficit on finger-to-nose testing  Skin:  Pink warm and dry  Psychiatric:  Alert, pleasant, cooperative            Labs Reviewed   BASIC METABOLIC PANEL - Abnormal       Result Value Ref Range Status    Sodium 129 (*) 136 - 145 mmol/L Final    Potassium 4 2  3 5 - 5 3 mmol/L Final    Chloride 96 (*) 100 - 108 mmol/L Final    CO2 24  21 - 32 mmol/L Final    ANION GAP 9  4 - 13 mmol/L Final    BUN 78 (*) 5 - 25 mg/dL Final    Creatinine 4 23 (*) 0 60 - 1 30 mg/dL Final    Comment: Standardized to IDMS reference method    Glucose 111  65 - 140 mg/dL Final    Comment: If the patient is fasting, the ADA then defines impaired fasting glucose as > 100 mg/dL and diabetes as > or equal to 123 mg/dL  Specimen collection should occur prior to Sulfasalazine administration due to the potential for falsely depressed results  Specimen collection should occur prior to Sulfapyridine administration due to the potential for falsely elevated results      Calcium 7 9 (*) 8 3 - 10 1 mg/dL Final    eGFR 10  ml/min/1 73sq m Final    Narrative:     [place] guidelines for Chronic Kidney Disease (CKD):                     Stage 1 with normal or high GFR (GFR > 90 mL/min/1 73 square meters)                    Stage 2 Mild CKD (GFR = 60-89 mL/min/1 73 square meters)                    Stage 3A Moderate CKD (GFR = 45-59 mL/min/1 73 square meters)                    Stage 3B Moderate CKD (GFR = 30-44 mL/min/1 73 square meters)                    Stage 4 Severe CKD (GFR = 15-29 mL/min/1 73 square meters)                    Stage 5 End Stage CKD (GFR <15 mL/min/1 73 square meters)                  Note: GFR calculation is accurate only with a steady state creatinine   CBC AND DIFFERENTIAL - Abnormal    WBC 13 06 (*) 4 31 - 10 16 Thousand/uL Final    RBC 2 66 (*) 3 81 - 5 12 Million/uL Final    Hemoglobin 8 4 (*) 11 5 - 15 4 g/dL Final    Hematocrit 26 3 (*) 34 8 - 46 1 % Final    MCV 99 (*) 82 - 98 fL Final    MCH 31 6  26 8 - 34 3 pg Final    MCHC 31 9  31 4 - 37 4 g/dL Final    RDW 15 2 (*) 11 6 - 15 1 % Final    MPV 10 3  8 9 - 12 7 fL Final    Platelets 716  468 - 390 Thousands/uL Final    nRBC 0  /100 WBCs Final    Neutrophils Relative 70  43 - 75 % Final    Immat GRANS % 1  0 - 2 % Final    Lymphocytes Relative 19  14 - 44 % Final    Monocytes Relative 8  4 - 12 % Final    Eosinophils Relative 1  0 - 6 % Final    Basophils Relative 1  0 - 1 % Final    Neutrophils Absolute 9 13 (*) 1 85 - 7 62 Thousands/µL Final    Immature Grans Absolute 0 11  0 00 - 0 20 Thousand/uL Final    Lymphocytes Absolute 2 52  0 60 - 4 47 Thousands/µL Final    Monocytes Absolute 1 07  0 17 - 1 22 Thousand/µL Final    Eosinophils Absolute 0 17  0 00 - 0 61 Thousand/µL Final    Basophils Absolute 0 06  0 00 - 0 10 Thousands/µL Final   HS TROPONIN I 0HR - Abnormal    hs TnI 0hr 9,361 (*) "Refer to ACS Flowchart"- see link ng/L Final    Comment:                                              Initial (time 0) result  If >=50 ng/L, Myocardial injury suggested ;  Type of myocardial injury and treatment strategy  to be determined  If 5-49 ng/L, a delta result at 2 hours and or 4 hours will be needed to further evaluate  If <4 ng/L, and chest pain has been >3 hours since onset, patient may qualify for discharge based on the HEART score in the ED  If <5 ng/L and <3hours since onset of chest pain, a delta result at 2 hours will be needed to further evaluate  HS Troponin 99th Percentile URL of a Health Population=12 ng/L with a 95% Confidence Interval of 8-18 ng/L      Second Troponin (time 2 hours)  If calculated delta >= 20 ng/L, Myocardial injury suggested ; Type of myocardial injury and treatment strategy to be determined  If 5-49 ng/L and the calculated delta is 5-19 ng/L, consult medical service for evaluation  Continue evaluation for ischemia on ecg and other possible etiology and repeat hs troponin at 4 hours  If delta is <5 ng/L at 2 hours, consider discharge based on risk stratification via the HEART score (if in ED), or DOREEN risk score in IP/Observation  HS Troponin 99th Percentile URL of a Health Population=12 ng/L with a 95% Confidence Interval of 8-18 ng/L    HS TROPONIN I 2HR - Abnormal    hs TnI 2hr 8,542 (*) "Refer to ACS Flowchart"- see link ng/L Final    Comment:                                              Initial (time 0) result  If >=50 ng/L, Myocardial injury suggested ;  Type of myocardial injury and treatment strategy  to be determined  If 5-49 ng/L, a delta result at 2 hours and or 4 hours will be needed to further evaluate  If <4 ng/L, and chest pain has been >3 hours since onset, patient may qualify for discharge based on the HEART score in the ED  If <5 ng/L and <3hours since onset of chest pain, a delta result at 2 hours will be needed to further evaluate  HS Troponin 99th Percentile URL of a Health Population=12 ng/L with a 95% Confidence Interval of 8-18 ng/L  Second Troponin (time 2 hours)  If calculated delta >= 20 ng/L,  Myocardial injury suggested ; Type of myocardial injury and treatment strategy to be determined  If 5-49 ng/L and the calculated delta is 5-19 ng/L, consult medical service for evaluation  Continue evaluation for ischemia on ecg and other possible etiology and repeat hs troponin at 4 hours  If delta is <5 ng/L at 2 hours, consider discharge based on risk stratification via the HEART score (if in ED), or DOREEN risk score in IP/Observation  HS Troponin 99th Percentile URL of a Health Population=12 ng/L with a 95% Confidence Interval of 8-18 ng/L      Delta 2hr hsTnI -819  <20 ng/L Final   PROTIME-INR - Abnormal    Protime 14 9 (*) 11 6 - 14 5 seconds Final    INR 1 21 (*) 0 84 - 1 19 Final   CK - Abnormal    Total  (*) 26 - 192 U/L Final   HEPATIC FUNCTION PANEL - Abnormal    Total Bilirubin 0 24  0 20 - 1 00 mg/dL Final    Comment: Use of this assay is not recommended for patients undergoing treatment with eltrombopag due to the potential for falsely elevated results  Bilirubin, Direct 0 10  0 00 - 0 20 mg/dL Final    Alkaline Phosphatase 103  46 - 116 U/L Final    AST 92 (*) 5 - 45 U/L Final    Comment: Specimen collection should occur prior to Sulfasalazine administration due to the potential for falsely depressed results   (*) 12 - 78 U/L Final    Comment: Specimen collection should occur prior to Sulfasalazine administration due to the potential for falsely depressed results       Total Protein 6 2 (*) 6 4 - 8 2 g/dL Final    Albumin 2 6 (*) 3 5 - 5 0 g/dL Final   CKMB - Abnormal    CK-MB Index 4 3 (*) 0 0 - 2 5 % Final    CK-MB 16 4 (*) 0 0 - 5 0 ng/mL Final   POCT GLUCOSE - Abnormal    POC Glucose 165 (*) 65 - 140 mg/dl Final   APTT - Normal    PTT 36  23 - 37 seconds Final    Comment: Therapeutic Heparin Range =  60-90 seconds   HS TROPONIN I 4HR       Medications   lactated ringers infusion 1,000 mL (1,000 mL Intravenous New Bag 6/22/22 0021)   heparin (porcine) 25,000 units in 0 45% NaCl 250 mL infusion (premix) (11 1 Units/kg/hr × 90 kg (Order-Specific) Intravenous New Bag 6/22/22 0022)   heparin (porcine) injection 4,000 Units (has no administration in time range)   heparin (porcine) injection 2,000 Units (has no administration in time range)   aspirin chewable tablet 324 mg (243 mg Oral Given 6/22/22 0024)   heparin (porcine) injection 4,000 Units (4,000 Units Intravenous Given 6/22/22 0021)       XR chest portable    (Results Pending)       ED Course as of 06/22/22 0145   Tue Jun 21, 2022   6942 ECG performed by nursing staff prior to my assessment, interpreted by me, sinus bradycardia, rate of 48, right axis deviation left bundle-branch block  Other than the rate, no acute change from June 19, 2022   2322 Creatinine 4 23 represents ALFRED when compared with 2 14 two days ago, chronic anemia, was 9 7 two days ago       Assessment/Plan     ED Medical Decision Making:    Patient presented bradycardic with the syncopal episode, initial concern was for cardiac syncope  Workup shows that she has significant elevated troponin consistent with non ST segment elevation MI, also has acute kidney injury  No evidence of rectal bleeding on exam, heparin started, IV fluids started reassessed patient multiple times, remained hemodynamically stable and comfortable in the ED  Critical care time:  32 minutes  Please see separate procedure note for details        Time reflects when diagnosis was documented in both MDM as applicable and the Disposition within this note       Time User Action Codes Description Comment    6/21/2022 11:54 PM Julia Armijo [I21 4] NSTEMI (non-ST elevated myocardial infarction) ([facility] [address] )     6/21/2022 11:54 PM Uyen Martin Add [N17 9] [facility]     6/21/2022 11:54 PM Uyen Martin Add [R00 1] Bradycardia     6/21/2022 11:54 PM Julia Armijo [D64 9] Chronic anemia     6/21/2022 11:54 PM Uyen Martin Add [N31 9] Neurogenic bladder     6/21/2022 11:54 PM Uyen Martin Add [R55] Syncope           ED Disposition       ED Disposition   Admit    Condition   Stable    Date/Time   Wed Jun 22, 2022 12:02 AM    Comment   Case was discussed with Christina Dias and the patient's admission status was agreed to be Admission Status: inpatient status to the service of Dr Kristina Mendez                  Follow-up Information    None         New Prescriptions    No medications on file            Adrian Ventura DO  06/22/22 0147

## 2022-06-22 NOTE — ASSESSMENT & PLAN NOTE
Lab Results   Component Value Date    HGBA1C 7 3 (A) 03/29/2022       Recent Labs     06/21/22  2227 06/22/22  0746   POCGLU 165* 85       Blood Sugar Average: Last 72 hrs:  (P) 125   On lantus 50 iu bid and scheduled humalog  Hold scheduled humalog reduce lantus given acute illness top 20 iu bid and start ssi/poc bs

## 2022-06-22 NOTE — ASSESSMENT & PLAN NOTE
Lab Results   Component Value Date    EGFR 10 06/22/2022    EGFR 10 06/21/2022    EGFR 24 06/19/2022    CREATININE 4 37 (H) 06/22/2022    CREATININE 4 23 (H) 06/21/2022    CREATININE 2 14 (H) 06/19/2022   likely 2* n/v/d and decreased po intake  Check pvr I/os and ua  Hold nephrotoxins  Consul nephrology given cp and coordination of any further diagnostics of elevated troponin w/cards

## 2022-06-22 NOTE — ASSESSMENT & PLAN NOTE
Hx of stenting in [facility] on asa/statin/plavix w/100% chronic RCA total occlusion on catheterization in 08/21    Hold bb given hypotension  Continue op regimen  Consult cardiology

## 2022-06-22 NOTE — ED PROCEDURE NOTE
PROCEDURE  CriticalCare Time  Performed by: Tristen Hayward DO  Authorized by:  Tristen Hayward DO     Critical care provider statement:     Critical care time (minutes):  32    Critical care time was exclusive of:  Separately billable procedures and treating other patients    Critical care was necessary to treat or prevent imminent or life-threatening deterioration of the following conditions:  Cardiac failure (NSTEMI)    Critical care was time spent personally by me on the following activities:  Blood draw for specimens, obtaining history from patient or surrogate, ordering and performing treatments and interventions, ordering and review of laboratory studies, ordering and review of radiographic studies, re-evaluation of patient's condition, review of old charts, evaluation of patient's response to treatment, discussions with primary provider, development of treatment plan with patient or surrogate and examination of patient    I assumed direction of critical care for this patient from another provider in my specialty: no           Tristen Hayward DO  06/22/22 0149

## 2022-06-23 ENCOUNTER — APPOINTMENT (INPATIENT)
Dept: CT IMAGING | Facility: HOSPITAL | Age: 62
DRG: 247 | End: 2022-06-23
Payer: COMMERCIAL

## 2022-06-23 ENCOUNTER — APPOINTMENT (INPATIENT)
Dept: ULTRASOUND IMAGING | Facility: HOSPITAL | Age: 62
DRG: 247 | End: 2022-06-23
Payer: COMMERCIAL

## 2022-06-23 PROBLEM — R00.1 BRADYCARDIA: Status: ACTIVE | Noted: 2022-06-23

## 2022-06-23 PROBLEM — Z86.2 HISTORY OF ANEMIA DUE TO CHRONIC KIDNEY DISEASE: Status: ACTIVE | Noted: 2022-06-23

## 2022-06-23 PROBLEM — R51.9 HEADACHE: Status: ACTIVE | Noted: 2022-06-23

## 2022-06-23 PROBLEM — E87.1 HYPONATREMIA: Status: ACTIVE | Noted: 2022-06-23

## 2022-06-23 PROBLEM — N18.9 HISTORY OF ANEMIA DUE TO CHRONIC KIDNEY DISEASE: Status: ACTIVE | Noted: 2022-06-23

## 2022-06-23 LAB
ANION GAP SERPL CALCULATED.3IONS-SCNC: 11 MMOL/L (ref 4–13)
APTT PPP: 37 SECONDS (ref 23–37)
APTT PPP: 54 SECONDS (ref 23–37)
APTT PPP: 95 SECONDS (ref 23–37)
BASOPHILS # BLD AUTO: 0.08 THOUSANDS/ΜL (ref 0–0.1)
BASOPHILS NFR BLD AUTO: 1 % (ref 0–1)
BUN SERPL-MCNC: 88 MG/DL (ref 5–25)
CALCIUM SERPL-MCNC: 7.5 MG/DL (ref 8.3–10.1)
CHLORIDE SERPL-SCNC: 95 MMOL/L (ref 100–108)
CO2 SERPL-SCNC: 21 MMOL/L (ref 21–32)
CREAT SERPL-MCNC: 4.52 MG/DL (ref 0.6–1.3)
EOSINOPHIL # BLD AUTO: 0.4 THOUSAND/ΜL (ref 0–0.61)
EOSINOPHIL NFR BLD AUTO: 3 % (ref 0–6)
ERYTHROCYTE [DISTWIDTH] IN BLOOD BY AUTOMATED COUNT: 15.1 % (ref 11.6–15.1)
GFR SERPL CREATININE-BSD FRML MDRD: 9 ML/MIN/1.73SQ M
GLUCOSE SERPL-MCNC: 155 MG/DL (ref 65–140)
GLUCOSE SERPL-MCNC: 186 MG/DL (ref 65–140)
GLUCOSE SERPL-MCNC: 214 MG/DL (ref 65–140)
GLUCOSE SERPL-MCNC: 238 MG/DL (ref 65–140)
GLUCOSE SERPL-MCNC: 305 MG/DL (ref 65–140)
HCT VFR BLD AUTO: 24.9 % (ref 34.8–46.1)
HGB BLD-MCNC: 8.1 G/DL (ref 11.5–15.4)
IMM GRANULOCYTES # BLD AUTO: 0.15 THOUSAND/UL (ref 0–0.2)
IMM GRANULOCYTES NFR BLD AUTO: 1 % (ref 0–2)
LYMPHOCYTES # BLD AUTO: 3.41 THOUSANDS/ΜL (ref 0.6–4.47)
LYMPHOCYTES NFR BLD AUTO: 29 % (ref 14–44)
MCH RBC QN AUTO: 32.5 PG (ref 26.8–34.3)
MCHC RBC AUTO-ENTMCNC: 32.5 G/DL (ref 31.4–37.4)
MCV RBC AUTO: 100 FL (ref 82–98)
MONOCYTES # BLD AUTO: 0.94 THOUSAND/ΜL (ref 0.17–1.22)
MONOCYTES NFR BLD AUTO: 8 % (ref 4–12)
NEUTROPHILS # BLD AUTO: 6.83 THOUSANDS/ΜL (ref 1.85–7.62)
NEUTS SEG NFR BLD AUTO: 58 % (ref 43–75)
NRBC BLD AUTO-RTO: 0 /100 WBCS
PLATELET # BLD AUTO: 255 THOUSANDS/UL (ref 149–390)
PMV BLD AUTO: 10.1 FL (ref 8.9–12.7)
POTASSIUM SERPL-SCNC: 4.2 MMOL/L (ref 3.5–5.3)
RBC # BLD AUTO: 2.49 MILLION/UL (ref 3.81–5.12)
SODIUM SERPL-SCNC: 127 MMOL/L (ref 136–145)
WBC # BLD AUTO: 11.81 THOUSAND/UL (ref 4.31–10.16)

## 2022-06-23 PROCEDURE — 85730 THROMBOPLASTIN TIME PARTIAL: CPT | Performed by: FAMILY MEDICINE

## 2022-06-23 PROCEDURE — 76770 US EXAM ABDO BACK WALL COMP: CPT

## 2022-06-23 PROCEDURE — 99232 SBSQ HOSP IP/OBS MODERATE 35: CPT | Performed by: INTERNAL MEDICINE

## 2022-06-23 PROCEDURE — 80048 BASIC METABOLIC PNL TOTAL CA: CPT | Performed by: PHYSICIAN ASSISTANT

## 2022-06-23 PROCEDURE — 99233 SBSQ HOSP IP/OBS HIGH 50: CPT | Performed by: INTERNAL MEDICINE

## 2022-06-23 PROCEDURE — 70450 CT HEAD/BRAIN W/O DYE: CPT

## 2022-06-23 PROCEDURE — 85730 THROMBOPLASTIN TIME PARTIAL: CPT | Performed by: PHYSICIAN ASSISTANT

## 2022-06-23 PROCEDURE — 85025 COMPLETE CBC W/AUTO DIFF WBC: CPT | Performed by: FAMILY MEDICINE

## 2022-06-23 PROCEDURE — 99232 SBSQ HOSP IP/OBS MODERATE 35: CPT

## 2022-06-23 PROCEDURE — 82948 REAGENT STRIP/BLOOD GLUCOSE: CPT

## 2022-06-23 RX ORDER — ASPIRIN 81 MG/1
81 TABLET, CHEWABLE ORAL DAILY
Status: DISCONTINUED | OUTPATIENT
Start: 2022-06-23 | End: 2022-07-02 | Stop reason: HOSPADM

## 2022-06-23 RX ORDER — OXYCODONE HYDROCHLORIDE 5 MG/1
5 TABLET ORAL EVERY 4 HOURS PRN
Status: DISCONTINUED | OUTPATIENT
Start: 2022-06-23 | End: 2022-06-25

## 2022-06-23 RX ADMIN — OLANZAPINE 5 MG: 5 TABLET, FILM COATED ORAL at 21:55

## 2022-06-23 RX ADMIN — ACETAMINOPHEN 325MG 650 MG: 325 TABLET ORAL at 20:07

## 2022-06-23 RX ADMIN — ACETAMINOPHEN 325MG 650 MG: 325 TABLET ORAL at 06:03

## 2022-06-23 RX ADMIN — LEVOTHYROXINE SODIUM 50 MCG: 50 TABLET ORAL at 05:53

## 2022-06-23 RX ADMIN — OXYCODONE HYDROCHLORIDE 5 MG: 5 TABLET ORAL at 11:31

## 2022-06-23 RX ADMIN — INSULIN GLARGINE 25 UNITS: 100 INJECTION, SOLUTION SUBCUTANEOUS at 21:56

## 2022-06-23 RX ADMIN — HEPARIN SODIUM 2000 UNITS: 1000 INJECTION INTRAVENOUS; SUBCUTANEOUS at 14:10

## 2022-06-23 RX ADMIN — INSULIN GLARGINE 25 UNITS: 100 INJECTION, SOLUTION SUBCUTANEOUS at 09:24

## 2022-06-23 RX ADMIN — ALLOPURINOL 300 MG: 300 TABLET ORAL at 09:23

## 2022-06-23 RX ADMIN — CLOPIDOGREL BISULFATE 75 MG: 75 TABLET ORAL at 09:23

## 2022-06-23 RX ADMIN — HEPARIN SODIUM 4000 UNITS: 1000 INJECTION INTRAVENOUS; SUBCUTANEOUS at 22:02

## 2022-06-23 RX ADMIN — ISOSORBIDE MONONITRATE 60 MG: 60 TABLET, EXTENDED RELEASE ORAL at 09:23

## 2022-06-23 RX ADMIN — INSULIN LISPRO 2 UNITS: 100 INJECTION, SOLUTION INTRAVENOUS; SUBCUTANEOUS at 17:25

## 2022-06-23 RX ADMIN — HEPARIN SODIUM 17.1 UNITS/KG/HR: 10000 INJECTION, SOLUTION INTRAVENOUS at 17:24

## 2022-06-23 RX ADMIN — INSULIN LISPRO 3 UNITS: 100 INJECTION, SOLUTION INTRAVENOUS; SUBCUTANEOUS at 12:41

## 2022-06-23 RX ADMIN — ACETAMINOPHEN 325MG 650 MG: 325 TABLET ORAL at 12:45

## 2022-06-23 RX ADMIN — OXYCODONE HYDROCHLORIDE 5 MG: 5 TABLET ORAL at 17:29

## 2022-06-23 RX ADMIN — CITALOPRAM HYDROBROMIDE 40 MG: 20 TABLET ORAL at 09:23

## 2022-06-23 RX ADMIN — ATORVASTATIN CALCIUM 40 MG: 40 TABLET, FILM COATED ORAL at 17:25

## 2022-06-23 RX ADMIN — ACETAMINOPHEN 325MG 650 MG: 325 TABLET ORAL at 00:08

## 2022-06-23 RX ADMIN — INSULIN LISPRO 4 UNITS: 100 INJECTION, SOLUTION INTRAVENOUS; SUBCUTANEOUS at 21:56

## 2022-06-23 RX ADMIN — ASPIRIN 81 MG CHEWABLE TABLET 81 MG: 81 TABLET CHEWABLE at 12:41

## 2022-06-23 RX ADMIN — INSULIN LISPRO 1 UNITS: 100 INJECTION, SOLUTION INTRAVENOUS; SUBCUTANEOUS at 09:24

## 2022-06-23 NOTE — ASSESSMENT & PLAN NOTE
Wt Readings from Last 3 Encounters:   06/23/22 129 kg (285 lb 7 9 oz)   06/19/22 128 kg (282 lb)   04/13/22 128 kg (282 lb)     · Patient has a history of chronic combined systolic and diastolic congestive heart failure  · 2D echocardiogram with left ventricular ejection fraction 30%  Akinesis of basal inferior septal, mid inferior septal, mid inferior, and apical inferior walls    · Compared to previous echocardiogram from 08/21 ejection fraction had been 45%  · Home torsemide on hold due to volume depletion and orthostasis

## 2022-06-23 NOTE — ASSESSMENT & PLAN NOTE
· Patient presented for evaluation of chest pain  · Patient has a history of coronary artery disease, with previous PCI x5, with stents in the LAD and circumflex artery, and chronic total occlusion of the RCA at the ostium  · Follows with the cardiologist Dr Orestes Witt in the office:  Most recently 4/13, notes reviewed, patient was not having any unstable angina  Option of future Ranexa addition to her regimen was entertained if she were to have angina  · Most recent cardiac catheterization 8/21:  Revealed 100% chronic total occlusion of proximal RCA, 30% stenosis proximal LAD at the site of prior stent, mid LAD stent, distal LAD stent, proximal circumflex stent patent  · Patient presented with near syncope, chest pain and elevated troponin that peaked at 8,542  · She was evaluated the cardiology team who recommended echocardiogram   · 2D echo with decreased ejection fraction of 30%, decreased from 45% last year  New wall motion abnormalities  · Continue Plavix, statin, isosorbide    No beta-blocker due to bradycardia  · D/w cardiology- recommend cont heparin drip  · Being evaluated for possible future ischemic evaluation

## 2022-06-23 NOTE — ASSESSMENT & PLAN NOTE
Lab Results   Component Value Date    HGBA1C 7 3 (A) 03/29/2022       Recent Labs     06/22/22  1136 06/22/22  1605 06/22/22 2054 06/23/22  0744   POCGLU 127 122 227* 155*       Blood Sugar Average: Last 72 hrs:  (P) 144 [id number]   · Patient has history of diabetes type 2, with long-term use of insulin, with associated chronic kidney disease stage  · Home regimen includes lantus 50 iu bid and scheduled humalog 20 t i d   Plus Trulicity once a week  · Patient's regimen was decreased while in the hospital to avoid hypoglycemia  · Continue to monitor and titrate as needed

## 2022-06-23 NOTE — ASSESSMENT & PLAN NOTE
· Patient presented with hypotensive, and syncope in the setting of recent GI losses  · Patient is given IV fluids with improvement in blood pressure  · Continue to hold diuretic  · IV fluids under the guidance of the nephrology team  · Beta-blocker on hold

## 2022-06-23 NOTE — ASSESSMENT & PLAN NOTE
· Hypovolemic hyponatremia, likely secondary to recent GI losses  · Continue isotonic IV fluid  · Hold torsemide

## 2022-06-23 NOTE — ASSESSMENT & PLAN NOTE
Lab Results   Component Value Date    EGFR 9 06/23/2022    EGFR 10 06/22/2022    EGFR 10 06/21/2022    CREATININE 4 52 (H) 06/23/2022    CREATININE 4 37 (H) 06/22/2022    CREATININE 4 23 (H) 06/21/2022     · Patient has a history of chronic kidney disease stage 4 with baseline creatinine 2 5-2 9  · Follows with [address] [place] [address] Nephrology as an outpatient  · Patient presented with acute kidney injury  · She was evaluated by the nephrology team   Acute kidney injury felt to be prerenal azotemia, secondary to volume depletion, dehydration due to recent GI losses, in the setting of chronic diuretic use: has progressed to ATN  · Continue IV fluids and hold torsemide  · Renal ultrasound pending

## 2022-06-23 NOTE — QUICK NOTE
Patient complaining of persistent headache despite Tylenol 625 mg x 1  She is requesting a Norco tab 5/325mg adjust generally has helped with her headache in the past   Will order 1 time dose  Toradol contraindicated in setting of end-stage renal disease

## 2022-06-23 NOTE — ASSESSMENT & PLAN NOTE
· Patient notes pain over both maxillary sinuses, sensation similar to previous sinus infection  · No focal neurologic signs symptoms or deficits  · Check CT scan the brain to evaluate sinusitis

## 2022-06-23 NOTE — PROGRESS NOTES
Progress Note - Cardiology   Jarrett Ang 63 y o  female MRN: [id number]  Unit/Bed#: E4 -01 Encounter: [id number]      Assessment/Recommendations/Discussion:   1  Chest pain, atypical, with history of CAD and multiple PCI in the past, chronic total occlusion of the RCA   2  Echo with new reduced ejection fraction of 30% and new regional wall motion abnormalities  3  Coronary artery disease   4  Near syncope  5  Acute renal failure on chronic kidney disease   6  Nausea, diarrhea, anorexia suspect possible gastro enteritis and volume depletion   7  Heart failure with reduced ejection fraction with regional wall motion abnormalities consistent with coronary artery disease on prior echo last year August 2021   8  Dyslipidemia   9  Chronic anemia   10  Diabetes   11  Polycystic ovarian syndrome   12  Fibromyalgia   13  Neurogenic bladder with suprapubic catheter in place   14  Obstructive sleep apnea, not currently being treated     Results from last 7 days   Lab Units 06/22/22  1349   SL CV LV EF  30        PLAN   Renal function is essentially unchanged from yesterday   Echo with new reduced ejection fraction and new regional wall motion abnormalities concerning for myocardial infarction   Would continue with heparin drip another 24 hours   Avoid hypotension and excessive volume administration  Plan to follow-up with Nephrology  Will need optimization underlying renal function and assessment for renal recovery prior to further ischemic evaluation, however would recommend further ischemic evaluation with cath given new reduced EF and new regional wall motion abnormalities with her history of multiple PCIs in the past/CAD  I suspect she may have had a myocardial infarction several days prior to admission, has down trending troponins now with new wall motion abnormality and reduced EF     Continue with Lipitor 40 mg   Imdur   Plavix   Aspirin    Subjective:   HPI  No chest pain today, still feels fatigued, has swelling in her ankles today      Review of Systems: As noted in HPI  Rest of ROS is negative  Vitals:   /83 (BP Location: Left arm)   Pulse (!) 51   Temp (!) 97 1 °F (36 2 °C) (Temporal)   Resp 18   Ht 5' 8" (1 727 m)   Wt 129 kg (285 lb 7 9 oz)   SpO2 96%   BMI 43 41 kg/m²   Vitals:    06/22/22 1342 06/23/22 0600   Weight: 130 kg (286 lb) 129 kg (285 lb 7 9 oz)       Intake/Output Summary (Last 24 hours) at 6/23/2022 1147  Last data filed at 6/23/2022 0600  Gross per 24 hour   Intake --   Output 700 ml   Net -700 ml       Physical Exam   Constitutional: awake, alert and oriented, in no acute distress, no obvious deformities, obese female  Head: Normocephalic, without obvious abnormality, atraumatic  Eyes: conjunctivae clear and moist  Sclera anicteric  No xanthelasmas  Pupils equal bilaterally  Extraocular motions are full  Ear nose mouth and throat: ears are symmetrical bilaterally, hearing appears to be equal bilaterally, no nasal discharge or epistaxis, oropharynx is clear with moist mucous membranes  Neck:  Trachea is midline, neck is supple, no thyromegaly or significant lymphadenopathy, there is full range of motion  Lungs: clear to auscultation bilaterally, no wheezes, no rales, no rhonchi, no accessory muscle use, breathing is nonlabored  Heart: regular rate and rhythm, S1, S2 normal, no murmur, no click, no rub and no gallop, 1+ lower extremity edema  Abdomen:  Obese, soft, non-tender; bowel sounds normal; no masses,  no organomegaly  Psychiatric:  Patient is oriented to time, place, person, mood/affect is negative for depression, anxiety, agitation, appears to have appropriate insight  Skin: Skin is warm, dry, intact  No obvious rashes or lesions on exposed extremities  Nail beds are pink with no cyanosis or clubbing      TELEMETRY:   Lab Results:  Results from last 7 days   Lab Units 06/23/22  0500   WBC Thousand/uL 11 81*   HEMOGLOBIN g/dL 8 1*   HEMATOCRIT % 24 9* PLATELETS Thousands/uL 255     Results from last 7 days   Lab Units 06/23/22  0500 06/22/22  0637   POTASSIUM mmol/L 4 2 4 2   CHLORIDE mmol/L 95* 97*   CO2 mmol/L 21 22   BUN mg/dL 88* 81*   CREATININE mg/dL 4 52* 4 37*   CALCIUM mg/dL 7 5* 7 9*   ALK PHOS U/L  --  100   ALT U/L  --  137*   AST U/L  --  110*     Results from last 7 days   Lab Units 06/23/22  0500   POTASSIUM mmol/L 4 2   CHLORIDE mmol/L 95*   CO2 mmol/L 21   BUN mg/dL 88*   CREATININE mg/dL 4 52*   CALCIUM mg/dL 7 5*           Medications:    Current Facility-Administered Medications:     acetaminophen (TYLENOL) tablet 650 mg, 650 mg, Oral, Q6H PRN, Lauryn Rocha MD, 650 mg at 06/23/22 0603    allopurinol (ZYLOPRIM) tablet 300 mg, 300 mg, Oral, Daily, Debi House PA-C, 300 mg at 06/23/22 4771    atorvastatin (LIPITOR) tablet 40 mg, 40 mg, Oral, Daily With Azalea House PA-C, 40 mg at 06/22/22 1542    citalopram (CeleXA) tablet 40 mg, 40 mg, Oral, Daily, Debi House PA-C, 40 mg at 06/23/22 7929    clopidogrel (PLAVIX) tablet 75 mg, 75 mg, Oral, Daily, Debi House PA-C, 75 mg at 06/23/22 0923    heparin (porcine) 25,000 units in 0 45% NaCl 250 mL infusion (premix), 3-20 Units/kg/hr (Order-Specific), Intravenous, Titrated, Debi House PA-C, Last Rate: 13 6 mL/hr at 06/23/22 0553, 15 1 Units/kg/hr at 06/23/22 0553    heparin (porcine) injection 2,000 Units, 2,000 Units, Intravenous, Q1H PRN, LANRE Shea-C, 2,000 Units at 06/22/22 0744    heparin (porcine) injection 4,000 Units, 4,000 Units, Intravenous, Q1H PRN, Christo Hyde PA-C, 4,000 Units at 06/22/22 2207    insulin glargine (LANTUS) subcutaneous injection 25 Units 0 25 mL, 25 Units, Subcutaneous, Q12H [address], Debi House PA-C, 25 Units at 06/23/22 0924    insulin lispro (HumaLOG) 100 units/mL subcutaneous injection 1-6 Units, 1-6 Units, Subcutaneous, 4x Daily (AC & HS), 1 Units at 06/23/22 0924 **AND** Fingerstick Glucose (POCT), , , 4x Daily AC and at bedtime, Debi House PA-C    isosorbide mononitrate (IMDUR) 24 hr tablet 60 mg, 60 mg, Oral, Daily, Debi House PA-C, 60 mg at 06/23/22 1624    levothyroxine tablet 50 mcg, 50 mcg, Oral, Early Morning, Debi House PA-C, 50 mcg at 06/23/22 0553    OLANZapine (ZyPREXA) tablet 5 mg, 5 mg, Oral, HS, Debi House PA-C, 5 mg at 06/22/22 2103    ondansetron (ZOFRAN) injection 4 mg, 4 mg, Intravenous, Q6H PRN, Debi House PA-C, 4 mg at 06/22/22 1755    oxyCODONE (ROXICODONE) IR tablet 5 mg, 5 mg, Oral, Q4H PRN, Baljeet Gordon MD, 5 mg at 06/23/22 1131    sodium chloride 0 9 % infusion, 50 mL/hr, Intravenous, Continuous, Golden Berumen DO, Last Rate: 50 mL/hr at 06/23/22 0934, 50 mL/hr at 06/23/22 0934    This note was completed in part utilizing M-Modal Fluency Direct Software  Grammatical errors, random word insertions, spelling mistakes, and incomplete sentences may be an occasional consequence of this system secondary to software limitations, ambient noise, and hardware issues  If you have any questions or concerns about the content, text, or information contained within the body of this dictation, please contact the provider for clarification      Aundrea Costello DO, [facility]  6/23/2022 11:47 AM

## 2022-06-23 NOTE — ASSESSMENT & PLAN NOTE
· Patient was admitted after syncopal episode with associated hypotension  · Patient is noted be volume depleted, in acute kidney injury  · Home torsemide on hold  · Gentle IV fluids under the guidance of the cardiology and nephrology team  · Undergo evaluation for possible ischemia with Cardiology due to new wall motion abnormalities on echocardiogram, and elevated troponin of 8000

## 2022-06-23 NOTE — ASSESSMENT & PLAN NOTE
· Patient has a history of anemia secondary to chronic kidney disease  · On oral iron as an outpatient  · Continue monitor

## 2022-06-23 NOTE — ASSESSMENT & PLAN NOTE
· Patient noted to have bradycardia on telemetry,  · Home beta-blocker on hold  · Appreciate cardiology evaluation and recommendations

## 2022-06-23 NOTE — PROGRESS NOTES
NEPHROLOGY PROGRESS NOTE   Aileen Corrigan 63 y o  female MRN: [id number]  Unit/Bed#: E4 -01 Encounter: [id number]        ASSESSMENT & PLAN    53-year-old female with a history of stage 4 chronic kidney disease with a baseline creatinine of around 2 5-2 9, history of nephrotic range proteinuria, uncontrolled type 2 diabetes, hypertension, coronary artery disease and chronic combined systolic and diastolic congestive heart failure, chronic suprapubic catheter who presented with syncope and chest pain complicated by acute kidney injury    1  Acute kidney injury-present on admission likely pre renal azotemia that has transition to ATN versus cardiorenal syndrome   · She has some crackles in the bases on the right side and her blood pressures are stable she is making good urine and tolerating p o  Intake will discontinue IV fluid  · She has no respiratory distress at this point so will continue to hold diuretics although can be given if acutely short of breath  · Her urinalysis does show blood leukocytes but she has no signs an overt infection and she has a suprapubic catheter so will monitor  · Over 1 you L of urine output recorded  · Check BMPs daily  · If catheterization non Urgent would hold until creatinine in steady state as she is in high risk for progression       2  Stage 4 chronic kidney disease  · she has a baseline creatinine at 2 5-2 9   · she is on torsemide as an outpatient   · this is likely secondary to diabetic kidney disease and hypertensive nephrosclerosis plus-minus cardiorenal syndrome and multiple episodes of acute kidney injury  She is at high risk for progression       3  Hypertension  · -she is currently on Imdur 60 mg daily avoid hypotension the setting of acute kidney injury       4  Hyponatremia  · -appears to be hypovolemic at this point remains on normal saline running at 75 cc an hour       5   Chest pain with elevated troponin in the setting of advanced cardiomyopathy and acute kidney injury  · ongoing cardiology workup  will require cardiac catheterization  · Echo shows reduced ejection fraction to 30%        SUBJECTIVE:    Patient was seen today  No chest pain or shortness of breath  No fevers or chills nausea vomiting    12 point review of systems was otherwise negative besides what is mentioned above      Medications:    Current Facility-Administered Medications:     acetaminophen (TYLENOL) tablet 650 mg, 650 mg, Oral, Q6H PRN, Mario Justice MD, 650 mg at 06/23/22 1245    allopurinol (ZYLOPRIM) tablet 300 mg, 300 mg, Oral, Daily, Debi House PA-C, 300 mg at 06/23/22 2028    aspirin chewable tablet 81 mg, 81 mg, Oral, Daily, Елена Agarwal DO, 81 mg at 06/23/22 1241    atorvastatin (LIPITOR) tablet 40 mg, 40 mg, Oral, Daily With Olivier House PA-C, 40 mg at 06/22/22 1542    citalopram (CeleXA) tablet 40 mg, 40 mg, Oral, Daily, Debi House PA-C, 40 mg at 06/23/22 1477    clopidogrel (PLAVIX) tablet 75 mg, 75 mg, Oral, Daily, Debi House PA-C, 75 mg at 06/23/22 0923    heparin (porcine) 25,000 units in 0 45% NaCl 250 mL infusion (premix), 3-20 Units/kg/hr (Order-Specific), Intravenous, Titrated, Debi House PA-C, Last Rate: 13 6 mL/hr at 06/23/22 0553, 15 1 Units/kg/hr at 06/23/22 0553    heparin (porcine) injection 2,000 Units, 2,000 Units, Intravenous, Q1H PRN, Awa Engel PA-C, 2,000 Units at 06/22/22 0744    heparin (porcine) injection 4,000 Units, 4,000 Units, Intravenous, Q1H PRN, Awa Engel PA-C, 4,000 Units at 06/22/22 2207    insulin glargine (LANTUS) subcutaneous injection 25 Units 0 25 mL, 25 Units, Subcutaneous, Q12H [address], Debi House PA-C, 25 Units at 06/23/22 0924    insulin lispro (HumaLOG) 100 units/mL subcutaneous injection 1-6 Units, 1-6 Units, Subcutaneous, 4x Daily (AC & HS), 3 Units at 06/23/22 1241 **AND** Fingerstick Glucose (POCT), , , 4x Daily AC and at bedtime, Awa Engel, PA-PONCHO    isosorbide mononitrate (IMDUR) 24 hr tablet 60 mg, 60 mg, Oral, Daily, Debi House PA-C, 60 mg at 06/23/22 0438    levothyroxine tablet 50 mcg, 50 mcg, Oral, Early Morning, Debi House PA-C, 50 mcg at 06/23/22 0553    OLANZapine (ZyPREXA) tablet 5 mg, 5 mg, Oral, HS, Debi House PA-C, 5 mg at 06/22/22 2103    ondansetron (ZOFRAN) injection 4 mg, 4 mg, Intravenous, Q6H PRN, Debi House PA-C, 4 mg at 06/22/22 1755    oxyCODONE (ROXICODONE) IR tablet 5 mg, 5 mg, Oral, Q4H PRN, Mora Sanchez MD, 5 mg at 06/23/22 1131    OBJECTIVE:    Vitals:    06/23/22 0600 06/23/22 0700 06/23/22 0922 06/23/22 1110   BP:  139/72 128/74 162/83   BP Location:  Left arm Left arm Left arm   Pulse:  (!) 49 (!) 52 (!) 51   Resp:  18  18   Temp:  (!) 97 1 °F (36 2 °C)     TempSrc:  Temporal     SpO2:  96%  96%   Weight: 129 kg (285 lb 7 9 oz)      Height:            Temp:  [95 6 °F (35 3 °C)-97 1 °F (36 2 °C)] 97 1 °F (36 2 °C)  HR:  [46-54] 51  Resp:  [14-18] 18  BP: (109-162)/(59-83) 162/83  SpO2:  [94 %-97 %] 96 %     Body mass index is 43 41 kg/m²  Weight (last 2 days)     Date/Time Weight    06/23/22 0600 129 (285 5)    06/22/22 1342 130 (286)    06/21/22 2225 130 (286 6)          I/O last 3 completed shifts:  In: -   Out: 1250 [Urine:1250]    No intake/output data recorded        Physical exam:    General: no acute distress, cooperative, elevated BMI  Eyes: conjunctivae pink, anicteric sclerae  ENT: lips and mucous membranes moist, no exudates, normal external ears  Neck: ROM intact, no JVD  Chest:  Crackles on the right side  CVS: normal rate, non pericardial friction rub  Abdomen: soft, non-tender, non-distended, normoactive bowel sounds  Extremities:  Trace edema  Skin: no rash  Neuro: awake, alert, oriented, grossly intact  Psych:  Pleasant affect    Invasive Devices:      Lab Results:   Results from last 7 days   Lab Units 06/23/22  0500 06/22/22  0496 06/21/22  2249 06/19/22  0357 06/19/22  0315   WBC Thousand/uL 11 81* 12 97* 13 06*  --  11 04*   HEMOGLOBIN g/dL 8 1* 8 1* 8 4*  --  9 7*   HEMATOCRIT % 24 9* 24 8* 26 3*  --  30 5*   PLATELETS Thousands/uL 255 265 276  --  216   POTASSIUM mmol/L 4 2 4 2 4 2  --  4 9   CHLORIDE mmol/L 95* 97* 96*  --  109*   CO2 mmol/L 21 22 24  --  22   BUN mg/dL 88* 81* 78*  --  44*   CREATININE mg/dL 4 52* 4 37* 4 23*  --  2 14*   CALCIUM mg/dL 7 5* 7 9* 7 9*  --  8 7   MAGNESIUM mg/dL  --   --   --   --  2 5*   ALK PHOS U/L  --  100 103  --  93   ALT U/L  --  137* 110*  --  21   AST U/L  --  110* 92*  --  52*   LEUKOCYTES UA   --   --   --  100 0*  --    BLOOD UA   --   --   --  150 0*  --          Portions of the record may have been created with voice recognition software  Occasional wrong word or "sound a like" substitutions may have occurred due to the inherent limitations of voice recognition software  Read the chart carefully and recognize, using context, where substitutions have occurred  If you have any questions, please contact the dictating provider

## 2022-06-23 NOTE — ASSESSMENT & PLAN NOTE
· Patient presented with recent nausea, poor p o   Intake, and diarrhea  · Likely secondary to viral gastroenteritis  · No evidence of acute abdomen  · Continue supportive measures

## 2022-06-23 NOTE — PLAN OF CARE
Problem: MOBILITY - ADULT  Goal: Maintain or return to baseline ADL function  Description: INTERVENTIONS:  -  Assess patient's ability to carry out ADLs; assess patient's baseline for ADL function and identify physical deficits which impact ability to perform ADLs (bathing, care of mouth/teeth, toileting, grooming, dressing, etc )  - Assess/evaluate cause of self-care deficits   - Assess range of motion  - Assess patient's mobility; develop plan if impaired  - Assess patient's need for assistive devices and provide as appropriate  - Encourage maximum independence but intervene and supervise when necessary  - Involve family in performance of ADLs  - Assess for home care needs following discharge   - Consider OT consult to assist with ADL evaluation and planning for discharge  - Provide patient education as appropriate  Outcome: Progressing  Goal: Maintains/Returns to pre admission functional level  Description: INTERVENTIONS:  - Perform BMAT or MOVE assessment daily    - Set and communicate daily mobility goal to care team and patient/family/caregiver  - Collaborate with rehabilitation services on mobility goals if consulted  - Perform Range of Motion 3 times a day  - Reposition patient every 2 hours  - Dangle patient 3 times a day  - Stand patient 3 times a day  - Ambulate patient 3 times a day  - Out of bed to chair 3 times a day   - Out of bed for meals 3 times a day  - Out of bed for toileting  - Record patient progress and toleration of activity level   Outcome: Progressing     Problem: Nutrition/Hydration-ADULT  Goal: Nutrient/Hydration intake appropriate for improving, restoring or maintaining nutritional needs  Description: Monitor and assess patient's nutrition/hydration status for malnutrition  Collaborate with interdisciplinary team and initiate plan and interventions as ordered  Monitor patient's weight and dietary intake as ordered or per policy   Utilize nutrition screening tool and intervene as necessary  Determine patient's food preferences and provide high-protein, high-caloric foods as appropriate       INTERVENTIONS:  - Monitor oral intake, urinary output, labs, and treatment plans  - Assess nutrition and hydration status and recommend course of action  - Evaluate amount of meals eaten  - Assist patient with eating if necessary   - Allow adequate time for meals  - Recommend/ encourage appropriate diets, oral nutritional supplements, and vitamin/mineral supplements  - Order, calculate, and assess calorie counts as needed  - Recommend, monitor, and adjust tube feedings and TPN/PPN based on assessed needs  - Assess need for intravenous fluids  - Provide specific nutrition/hydration education as appropriate  - Include patient/family/caregiver in decisions related to nutrition  Outcome: Progressing     Problem: Potential for Falls  Goal: Patient will remain free of falls  Description: INTERVENTIONS:  - Educate patient/family on patient safety including physical limitations  - Instruct patient to call for assistance with activity   - Consult OT/PT to assist with strengthening/mobility   - Keep Call bell within reach  - Keep bed low and locked with side rails adjusted as appropriate  - Keep care items and personal belongings within reach  - Initiate and maintain comfort rounds  - Make Fall Risk Sign visible to staff  - Offer Toileting every 2 Hours, in advance of need  - Initiate/Maintain bed alarm  - Obtain necessary fall risk management equipment: bed alarm  - Apply yellow socks and bracelet for high fall risk patients  - Consider moving patient to room near nurses station  Outcome: Progressing

## 2022-06-23 NOTE — CASE MANAGEMENT
Case Management Assessment & Discharge Planning Note    Patient name Erika Cherry  Location East 4 /E4 4455  [facility]-* MRN [id number]  : 1960 Date 2022       Current Admission Date: 2022  Current Admission Diagnosis:Syncope   Patient Active Problem List    Diagnosis Date Noted    Hyponatremia 2022    History of anemia due to chronic kidney disease 2022    Bradycardia 2022    Headache 2022    Syncope 2022    Acute renal failure superimposed on chronic kidney disease ([facility] ) 2022    Chest pain 2022    Elevated troponin I level 2022    Hypotension 2022    Gastroenteritis 2022    ALFRED (acute kidney injury) ([facility] ) 2022    Encounter for examination following treatment at hospital 2022    Other artificial openings of urinary tract status ([facility] ) 02/10/2022    Continuous opioid dependence ([facility] ) 02/10/2022    [facility]al nodule [facility] 02/10/2022    Stable angina ([facility] ) 02/10/2022    Volume overload 02/10/2022    Acquired hypothyroidism 10/14/2021    Mixed hyperlipidemia 10/14/2021    Gastroesophageal reflux disease without esophagitis 10/13/2021    Other proteinuria 2021    Chronic combined systolic and diastolic CHF (congestive heart failure) ([facility] ) 2021    Prolonged QT interval 2021    Urinary tract infection associated with cystostomy catheter ([facility] ) 2021    Neurogenic bladder 2021    Morbid obesity with BMI of 45 0-49 9, adult ([facility] ) 06/15/2021    History of heart artery stent 06/15/2021    Stage 4 chronic kidney disease ([address] ) 2021    Memory impairment 2021    Chronic bronchitis ([facility] ) 2021    Severe episode of recurrent major depressive disorder, without psychotic features ([facility] ) 2021    Skin ulcer of hand, limited to breakdown of skin ([facility] ) 2021    HTN (hypertension) 2020    Diabetic ulcer of toe of right foot associated with type 2 diabetes mellitus, with muscle involvement without evidence of necrosis ([facility] ) 11/25/2020    Head lump 11/11/2020    Anemia 09/09/2020    Abnormal LFTs 09/09/2020    S/P cervical spinal fusion 09/09/2020    Major depressive disorder 09/02/2020    Type 2 diabetes mellitus with stage 4 chronic kidney disease, with long-term current use of insulin ([facility] ) 09/02/2020    Anxiety 09/02/2020    CAD (coronary artery disease) 09/02/2020    Osteoarthritis of left knee 09/02/2020    Cellulitis of finger of right hand 08/26/2020      LOS (days): 1  Geometric Mean LOS (GMLOS) (days): 3 70  Days to GMLOS:2     OBJECTIVE:    Risk of Unplanned Readmission Score: 49 55         Current admission status: Inpatient       Preferred Pharmacy:   [address] [facility], [address] [facility]  [place] [address]  Phone: [phone] Fax: [phone]    Primary Care Provider: CHERELLE Scott    Primary Insurance: [facility]  Secondary Insurance: [address] [facility]    ASSESSMENT:  [address] [facility] Representative - Daughter   Primary Phone: [phone] (Mobile)               Advance Directives  Does patient have a [address]?: No  Was patient offered paperwork?: Yes (declined stating she wishes to go to  so to have all areas completed officially)  Does patient currently have a Health Care decision maker?: Yes, please see Health Care Proxy section  Does patient have Advance Directives?: No  Was patient offered paperwork?: Yes (declined stating she wishes to go to  so to have all areas completed officially)  Primary Contact: Skylar Worley [phone]         Readmission Root Cause  30 Day Readmission: No    Patient Information  Admitted from[de-identified] Home  Mental Status: Alert  During Assessment patient was accompanied by: Not accompanied during assessment  Assessment information provided by[de-identified] Patient  Primary Caregiver: Family  Caregiver's Name[de-identified] [facility] Relationship to Patient[de-identified] Family Member  Support Systems: Family members, Daughter  [place] of Residence: [facility] do you live in?: [place] entry access options   Select all that apply : Stairs  Number of steps to enter home : 5  Do the steps have railings?: Yes (on left going up)  Type of Current Residence: [address] [place] home  Upon entering residence, is there a bedroom on the main floor (no further steps)?: Yes  Upon entering residence, is there a bathroom on the main floor (no further steps)?: Yes (powder room with full bath on 2nd floor- FF to same)  In the last 12 months, was there a time when you were not able to pay the mortgage or rent on time?: No  In the last 12 months, how many places have you lived?: 1  Living Arrangements: Lives w/ Daughter  Is patient a ?: No    Activities of Daily Living Prior to Admission  Functional Status: Assistance (ADL's assistance by pt's Dtr prn with IADL'S done by Dtr and her male friend)  Completes ADLs independently?: Yes (needs asst prn in which Dtr will assist)  Ambulates independently?: Yes (Has FF to 2nd floor bathroom where there's a walk in shower- had been able to navigate the steps independently up until 1 wk ago- now unable to do so)  Does patient use assisted devices?: Yes  Assisted Devices (DME) used: Straight Cane, Other (Comment) (uses a folding chair in the shower)  Does patient currently own DME?: Yes  What DME does the patient currently own?: Heidi Mckeon  Does patient have a history of Outpatient Therapy (PT/OT)?: No  Does the patient have a history of Short-Term Rehab?: No  Does patient have a history of HHC?: No  Does patient currently have [facility]?: Yes    Current Home Health Care  Type of Current Home Care Services: Home health aide (Pt's daughter is a paid caregiver through waiver program (48hrs/wk))    Patient Information Continued  Income Source: SSI/SSD  Does patient have prescription coverage?: Yes (Uses [facility] on [address])  Within the past 12 months, you worried that your food would run out before you got the money to buy more : Never true  Does patient have a history of substance abuse?: No  Does patient have a history of Mental Health Diagnosis?: Yes  Is patient receiving treatment for mental health?: Yes (MDD on Celexa provided by PCP  Had [facility] stay many years ago which was voluntary)         Means of Transportation  Means of Transport to Appts[de-identified] Family transport  In the past 12 months, has lack of transportation kept you from medical appointments or from getting medications?: No        DISCHARGE DETAILS:    Discharge planning discussed with[de-identified] pt (Pt would be open to STR should it be recommended especially in lieu of her recent decline in functioning/abilities within last week    Should rehab be recommended would like possible [facility] vs [facility] (would like to avoid a physical SNF if possible))        CM contacted family/caregiver?: No- see comments (pt states she talks to her daughter about what is going on and would like dispo discussed once therapy has worked with her)  Were Treatment Team discharge recommendations reviewed with patient/caregiver?: Yes  Did patient/caregiver verbalize understanding of patient care needs?: Yes       Contacts  Patient Contacts: Kali Bacon (daughter)  Relationship to Patient[de-identified] Family                   Would you like to participate in our [address] service program?  : No - Declined          Transport at Discharge : Auto with designated , Family        Transported by [employer] and Unit #): daughter or her male friend should pt be d/c home- otherwise will need [facility]CV

## 2022-06-23 NOTE — PROGRESS NOTES
[facility]  Progress Note - Rogenia Bun 1960, 63 y o  female MRN: [id number]  Unit/Bed#: E4 -01 Encounter: [id number]  Primary Care Provider: CHERELLE Jacobsen   Date and time admitted to hospital: 6/21/2022 10:21 PM    * Syncope  Assessment & Plan  · Patient was admitted after syncopal episode with associated hypotension  · Patient is noted be volume depleted, in acute kidney injury  · Home torsemide on hold  · Gentle IV fluids under the guidance of the cardiology and nephrology team  · Undergo evaluation for possible ischemia with Cardiology due to new wall motion abnormalities on echocardiogram, and elevated troponin of 8000    Acute renal failure superimposed on chronic kidney disease [facility])  Assessment & Plan  Lab Results   Component Value Date    EGFR 9 06/23/2022    EGFR 10 06/22/2022    EGFR 10 06/21/2022    CREATININE 4 52 (H) 06/23/2022    CREATININE 4 37 (H) 06/22/2022    CREATININE 4 23 (H) 06/21/2022     · Patient has a history of chronic kidney disease stage 4 with baseline creatinine 2 5-2 9  · Follows with [facility] as an outpatient  · Patient presented with acute kidney injury  · She was evaluated by the nephrology team   Acute kidney injury felt to be prerenal azotemia, secondary to volume depletion, dehydration due to recent GI losses, in the setting of chronic diuretic use: has progressed to ATN  · Continue IV fluids and hold torsemide  · Renal ultrasound pending    CAD (coronary artery disease)  Assessment & Plan  · Patient presented for evaluation of chest pain  · Patient has a history of coronary artery disease, with previous PCI x5, with stents in the LAD and circumflex artery, and chronic total occlusion of the RCA at the ostium  · Follows with the cardiologist Dr Odalys Eagle in the office:  Most recently 4/13, notes reviewed, patient was not having any unstable angina    Option of future Ranexa addition to her regimen was entertained if she were to have angina  · Most recent cardiac catheterization 8/21:  Revealed 100% chronic total occlusion of proximal RCA, 30% stenosis proximal LAD at the site of prior stent, mid LAD stent, distal LAD stent, proximal circumflex stent patent  · Patient presented with near syncope, chest pain and elevated troponin that peaked at 8,542  · She was evaluated the cardiology team who recommended echocardiogram   · 2D echo with decreased ejection fraction of 30%, decreased from 45% last year  New wall motion abnormalities  · Continue Plavix, statin, isosorbide  No beta-blocker due to bradycardia  · D/w cardiology- recommend cont heparin drip  · Being evaluated for possible future ischemic evaluation    Headache  Assessment & Plan  · Patient notes pain over both maxillary sinuses, sensation similar to previous sinus infection  · No focal neurologic signs symptoms or deficits  · Check CT scan the brain to evaluate sinusitis    Bradycardia  Assessment & Plan  · Patient noted to have bradycardia on telemetry,  · Home beta-blocker on hold  · Appreciate cardiology evaluation and recommendations    History of anemia due to chronic kidney disease  Assessment & Plan  · Patient has a history of anemia secondary to chronic kidney disease  · On oral iron as an outpatient  · Continue monitor    Hyponatremia  Assessment & Plan  · Hypovolemic hyponatremia, likely secondary to recent GI losses  · Continue isotonic IV fluid  · Hold torsemide    Gastroenteritis  Assessment & Plan  · Patient presented with recent nausea, poor p o   Intake, and diarrhea  · Likely secondary to viral gastroenteritis  · No evidence of acute abdomen  · Continue supportive measures    Hypotension  Assessment & Plan  · Patient presented with hypotensive, and syncope in the setting of recent GI losses  · Patient is given IV fluids with improvement in blood pressure  · Continue to hold diuretic  · IV fluids under the guidance of the nephrology team  · Beta-blocker on hold    Chronic combined systolic and diastolic CHF (congestive heart failure) ([facility] )  Assessment & Plan  Wt Readings from Last 3 Encounters:   06/23/22 129 kg (285 lb 7 9 oz)   06/19/22 128 kg (282 lb)   04/13/22 128 kg (282 lb)     · Patient has a history of chronic combined systolic and diastolic congestive heart failure  · 2D echocardiogram with left ventricular ejection fraction 30%  Akinesis of basal inferior septal, mid inferior septal, mid inferior, and apical inferior walls  · Compared to previous echocardiogram from 08/21 ejection fraction had been 45%  · Home torsemide on hold due to volume depletion and orthostasis    Type 2 diabetes mellitus with stage 4 chronic kidney disease, with long-term current use of insulin [facility])  Assessment & Plan  Lab Results   Component Value Date    HGBA1C 7 3 (A) 03/29/2022       Recent Labs     06/22/22  1136 06/22/22  1605 06/22/22  2054 06/23/22  0744   POCGLU 127 122 227* 155*       Blood Sugar Average: Last 72 hrs:  (P) 144 [id number]   · Patient has history of diabetes type 2, with long-term use of insulin, with associated chronic kidney disease stage  · Home regimen includes lantus 50 iu bid and scheduled humalog 20 t i d  Plus Trulicity once a week  · Patient's regimen was decreased while in the hospital to avoid hypoglycemia  · Continue to monitor and titrate as needed          Family:  Called pts  Daughter Nicole Tiwari and [facility]-->VM not set up    Unable to leave message    dw pts nurse  madina   Discussed with cardiologist:  Dr Jame Weems:  Continue heparin infusion    VTE Pharmacologic Prophylaxis: Heparin  VTE Mechanical Prophylaxis: sequential compression device        Certification Statement: The patient will continue to require additional inpatient hospital stay due to need for further acute intervention for acute kidney    Status: inpatient ===================================================================    Subjective:  Patient she has a pain over both cheeks, she indicates her maxillary sinus region  She denies any frontal sinus region headache  She notes pain in the roof of her mouth which she attributes to identical to previous sinus infection  She denies any nasal congestion  Denies any rhinorrhea  She notes she had history of migraine years ago but none recently  Denies any visual changes  Denies any focal numbness or weakness  Notes generalized weakness  She notes dehydration with poor p o  Intake and worsening nausea over the past week  She notes chronic GERD  She follows with GI for chronic abdominal pain  She denies any new abdominal pain or worsening abdominal pain  Notes her diarrhea and nausea have resolved  She was able to tolerate lunch today  No current diarrhea since admission  She denies any chest pain or chest discomfort  She denies any shortness of breath at rest   She notes dyspnea on exertion prior to admission  Patient notes prior to admission when she would stand she would feel woozy and lightheaded  She notes when EMS arrived and she stood to get on the stretcher she syncopized  She does not recall any additional details regarding the episode    Patient notes she currently takes hydrocodone for pain at home, with whole body aches  She notes she has taken oxycodone in the past with relief  She is requesting analgesics for her headache  Physical Exam:   Temp:  [95 6 °F (35 3 °C)-97 5 °F (36 4 °C)] 97 1 °F (36 2 °C)  HR:  [46-54] 52  Resp:  [14-18] 18  BP: (109-147)/(59-81) 128/74    Gen:  Pleasant, non-tachypnic, non-dyspnic  Conversant  HEENT:  Sclerae anicteric, EOMI  Tenderness with palpation over maxillary sinuses bilaterally  Heart: regular rate and rhythm, S1S2 present, no murmur, rub or gallop  Lungs: clear to ausculatation bilaterally  No wheezing, crackles, or rhonchi   No accessory muscle use or respiratory distress  Abd: soft, non-tender, non-distended  NABS, no guarding, rebound or peritoneal signs  Extremities: no clubbing, cyanosis or edema  2+pedal pulses bilaterally  Full range of motion  Neuro: awake, alert and oriented  Muscles of facial expression intact  Fluent and goal directed speech  Moving all 4 extremities  Skin: warm and dry: no petechiae, purpura and rash  LABS:   Results from last 7 days   Lab Units 06/23/22  0500 06/22/22  0637 06/21/22  2249   WBC Thousand/uL 11 81* 12 97* 13 06*   HEMOGLOBIN g/dL 8 1* 8 1* 8 4*   HEMATOCRIT % 24 9* 24 8* 26 3*   PLATELETS Thousands/uL 255 265 276     Results from last 7 days   Lab Units 06/23/22  0500 06/22/22  0637 06/21/22  2249   POTASSIUM mmol/L 4 2 4 2 4 2   CHLORIDE mmol/L 95* 97* 96*   CO2 mmol/L 21 22 24   BUN mg/dL 88* 81* 78*   CREATININE mg/dL 4 52* 4 37* 4 23*   CALCIUM mg/dL 7 5* 7 9* 7 [address] [place] [address] Data:  6/23:  Renal ultrasound:  Pending    6/22 echocardiogram:    Left Ventricle: Left ventricle is not well visualized  Wall thickness is normal  The left ventricular ejection fraction is 30% by visual estimation  Systolic function is severely reduced    The following segments are akinetic: basal inferoseptal, mid inferoseptal, mid inferior and apical inferior    The following segments are hypokinetic: basal anteroseptal, basal inferior, mid anteroseptal, mid inferolateral, apical septal, apical lateral and apex    All other segments are normal     Left Atrium: The atrium is mildly dilated (35-41 mL/m2)    Mitral Valve: The mitral valve was not well visualized    Tricuspid Valve: There is mild regurgitation  The right ventricular systolic pressure is mildly elevated  The estimated right ventricular systolic pressure is 60 02 mmHg    Pulmonic Valve: There is mild regurgitation    Compared to prior study the LV function has worsened and there are new regional wall motion abnormalities         6/22:  Chest x-ray:Development of mild cardiomegaly and right basal infiltrate/atelectasis      Microbiology  6/21:  Negative COVID, influenza    ---------------------------------------------------------------------------------------------------------------  This note has been constructed using a voice recognition system

## 2022-06-23 NOTE — UTILIZATION REVIEW
Initial Clinical Review    Admission: Date/Time/Statement:   Admission Orders (From admission, onward)     Ordered        06/22/22 0002  Inpatient Admission  Once                      Orders Placed This Encounter   Procedures    Inpatient Admission     Standing Status:   Standing     Number of Occurrences:   1     Order Specific Question:   Level of Care     Answer:   Med Surg [16]     Order Specific Question:   Estimated length of stay     Answer:   More than 2 Midnights     Order Specific Question:   Certification     Answer:   I certify that inpatient services are medically necessary for this patient for a duration of greater than two midnights  See H&P and MD Progress Notes for additional information about the patient's course of treatment  ED Arrival Information     Expected   -    Arrival   6/21/2022 22:21    Acuity   Emergent            Means of arrival   Ambulance    Escorted by   [facility] ([address])    Service   Hospitalist    Admission type   Emergency            Arrival complaint   syncope           Chief Complaint   Patient presents with    Syncope     Pt recently dx with stomach virus, took meds tonight and didn't feel normal  Took BP and it was high, called EMS  Hypotensive for EMS, witnessed syncopal episode lasting ~1 minute (per EMS)  Pt is bradycardiac on arrival with c/o dizziness  80s/40s in truck  No IV access  Chronic morales         Initial Presentation: 63 y o  female presents to the ED via EMS from home with c/o syncope and hypotension after several days of nausea, diarrhea, malaise, feeling ill and "off" with L side chest discomfort, decreased appetite, was seen in ED for same on 6/19  When EMS arrived she had syncopal episode with hypotension  PMH: cad, chronic combined systolic/diastolic chf, htn, dm, ckd stage 3, hld, neurogenic bladder with suprapubic catheter in place  In the ED she was hypotensive, had significant ALFRED and troponin elevation    She is treated with IV fluids, ASA, Heparin bolus and drip, IV Zofran  On exam she is ill-appearing, bradycardic  She is admitted to INPATIENT status with Syncope - Tele, IV fluids  Hypotension - hold oral antihypertensives, VS q 4 hr  Elevated troponins, h/o CAD and Chest pain - Heparin drip, Consult Cardio  Gastroenteritis - monitor off antibiotics  ARF on CKD - check PVR, UA, hold nephrotoxins, Nephrology consult  IDDM - SSI cover and reduce Lantus, hold Humalog  Pt is c/o persistent headache, treated with Tylenol and requesting Norco - will give 1 x dose  6/22 Cardio Consult - Atypical CP with h/o CAD, PCI, chronic total occlusion RCA, CAD, near syncope, ARF on CKD, heart failure, dyslipidemia - ALFRED from decreased PO intake d/t gastroenteritis  Significant troponin spill but decreasing  Get Echo, continue Lipitor, IV fluids, avoid hypotension, HTN, likely orthostatic syncope, Jeremiah cardia on Tele  6/22 Nephrology Consult - baseline creat 2 5-2 9 - ALFRED POA likely pre-renal, caution with IV fluids, Stage 4 CKD, HTN but now hypotensive, avoid hypotension  Hyponatremia - IV fluids, CP with elevated troponins with cardio following  IV fluids, hold diuretics, renal US, trend BMP, renal dose meds,  Optimize hemodynamics  Date: 6/23   Day 2:   Creat today up to 4 52, remains bradycardic, BP improved with fluids  Sodium down to 127  Continue IV fluids and holding Torsemide  Continues on IV Heparin drip  C/o  pain in both cheeks, CT head without disease  Diarrhea and nausea improved  Renal function unchanged  Cardio recommending further ischemic Work up with cath based on Echo results  If non-urgent recommend holding til creat steady   Still feels fatigued   + BLE swelling on exam       ED Triage Vitals [06/21/22 2225]   Temperature Pulse Respirations Blood Pressure SpO2   97 7 °F (36 5 °C) (!) 48 18 124/89 94 %      Temp Source Heart Rate Source Patient Position - Orthostatic VS BP Location FiO2 (%)   Oral Monitor Lying Right arm --      Pain Score       7          Wt Readings from Last 1 Encounters:   06/23/22 129 kg (285 lb 7 9 oz)     Additional Vital Signs:   06/23/22 0700 97 1 °F (36 2 °C) Abnormal  49 Abnormal  18 139/72 -- 96 % None (Room air) Lying   06/23/22 0300 95 6 °F (35 3 °C) Abnormal  46 Abnormal  16 121/76 87 97 % None (Room air) Lying   06/23/22 0010 95 9 °F (35 5 °C) Abnormal  51 Abnormal  16 131/81 91 96 % None (Room air) Lying   06/22/22 1900 96 6 °F (35 9 °C) Abnormal  52 Abnormal  15 124/75 -- 95 % None (Room air) Lying   06/22/22 1424 -- 54 Abnormal  14 109/59 -- 94 % None (Room air) Lying   06/22/22 1342 -- 53 Abnormal  -- 147/79 -- -- -- --   06/22/22 1132 97 5 °F (36 4 °C) 51 Abnormal  16 147/79 -- 96 % None (Room air) Lying   06/22/22 0750 97 7 °F (36 5 °C) 47 Abnormal  16 100/59 -- 95 % None (Room air) Lying   06/22/22 0624 -- 48 Abnormal  16 128/62 89 94 % None (Room air) Lying   06/22/22 0500 -- 46 Abnormal  14 104/60 76 94 % None (Room air) --   06/22/22 0351 -- 48 Abnormal  15 106/54 74 96 % None (Room air) Lying   06/22/22 0230 -- -- -- 101/57 73 -- -- --   06/22/22 0049 -- 49 Abnormal  16 98/57 -- 94 % None (Room air) Lying   06/21/22 2328 -- 48 Abnormal  18 90/51 -- 95 % None (Room air) Lying     Pertinent Labs/Diagnostic Test Results:     6/21 ECG - Marked sinus bradycardia  Right axis deviation  Left bundle branch block  Abnormal ECG    6/22 ECG - Marked sinus bradycardia  Non-specific intra-ventricular conduction block  Possible Lateral infarct (cited on or before 22-JUN-2022)  Abnormal ECG    6/22 Echo -   Left Ventricle: Left ventricle is not well visualized  Wall thickness is normal  The left ventricular ejection fraction is 30% by visual estimation  Systolic function is severely reduced    The following segments are akinetic: basal inferoseptal, mid inferoseptal, mid inferior and apical inferior      The following segments are hypokinetic: basal anteroseptal, basal inferior, mid anteroseptal, mid inferolateral, apical septal, apical lateral and apex    All other segments are normal     Left Atrium: The atrium is mildly dilated (35-41 mL/m2)    Mitral Valve: The mitral valve was not well visualized    Tricuspid Valve: There is mild regurgitation  The right ventricular systolic pressure is mildly elevated  The estimated right ventricular systolic pressure is 30 76 mmHg    Pulmonic Valve:  There is mild regurgitation    Compared to prior study the LV function has worsened and there are new regional wall motion abnormalities    6/23 CT head - No acute intracranial abnormality    XR chest portable   Final Result by Jade Burris MD (06/22 8972)   Development of mild cardiomegaly and right basal infiltrate/atelectasis               Workstation performed: [id number]          kidney and bladder    (Results Pending)        Results from last 7 days   Lab Units 06/19/22  0315   SARS-COV-2  Negative     Results from last 7 days   Lab Units 06/23/22  0500 06/22/22  0637 06/21/22 2249 06/19/22  0315   WBC Thousand/uL 11 81* 12 97* 13 06* 11 04*   HEMOGLOBIN g/dL 8 1* 8 1* 8 4* 9 7*   HEMATOCRIT % 24 9* 24 8* 26 3* 30 5*   PLATELETS Thousands/uL 255 265 276 216   NEUTROS ABS Thousands/µL 6 83 8 26* 9 13* 7 89*         Results from last 7 days   Lab Units 06/23/22  0500 06/22/22  0637 06/21/22 2249 06/19/22  0315   SODIUM mmol/L 127* 130* 129* 139   POTASSIUM mmol/L 4 2 4 2 4 2 4 9   CHLORIDE mmol/L 95* 97* 96* 109*   CO2 mmol/L 21 22 24 22   ANION GAP mmol/L 11 11 9 8   BUN mg/dL 88* 81* 78* 44*   CREATININE mg/dL 4 52* 4 37* 4 23* 2 14*   EGFR ml/min/1 73sq m 9 10 10 24   CALCIUM mg/dL 7 5* 7 9* 7 9* 8 7   MAGNESIUM mg/dL  --   --   --  2 5*     Results from last 7 days   Lab Units 06/22/22  0637 06/21/22 2249 06/19/22  0315   AST U/L 110* 92* 52*   ALT U/L 137* 110* 21   ALK PHOS U/L 100 103 93   TOTAL PROTEIN g/dL 6 2* 6 2* 7 3   ALBUMIN g/dL 2 7* 2 6* 3 9 TOTAL BILIRUBIN mg/dL 0 22 0 24 0 84   BILIRUBIN DIRECT mg/dL  --  0 10  --      Results from last 7 days   Lab Units 06/23/22  0744 06/22/22  2054 06/22/22  1605 06/22/22  1136 06/22/22  1000 06/22/22  0746 06/21/22  2227   POC GLUCOSE mg/dl 155* 227* 122 127 130 85 165*     Results from last 7 days   Lab Units 06/23/22  0500 06/22/22  0637 06/21/22  2249 06/19/22  0315   GLUCOSE RANDOM mg/dL 186* 48* 111 143*     Results from last 7 days   Lab Units 06/21/22  2249   CK TOTAL U/L 378*   CK MB INDEX % 4 3*   CK MB ng/mL 16 4*     Results from last 7 days   Lab Units 06/22/22  0315 06/22/22  0048 06/21/22  2249   HS TNI 0HR ng/L  --   --  9,361*   HS TNI 2HR ng/L  --  8,542*  --    HSTNI D2 ng/L  --  -819  --    HS TNI 4HR ng/L 7,903*  --   --    HSTNI D4 ng/L -1,458  --   --          Results from last 7 days   Lab Units 06/23/22  0500 06/22/22 2054 06/22/22  1310 06/22/22  0637 06/22/22  0027   PROTIME seconds  --   --   --   --  14 9*   INR   --   --   --   --  1 21*   PTT seconds 95* 36 56*   < > 36    < > = values in this interval not displayed       Results from last 7 days   Lab Units 06/19/22  0352   CLARITY UA  Slightly Cloudy*   COLOR UA  Yellow   SPEC GRAV UA  1 010   PH UA  7 0   GLUCOSE UA mg/dl 100 (1/10%)*   KETONES UA mg/dl Negative   BLOOD UA  150 0*   PROTEIN UA mg/dl >=500*   NITRITE UA  Negative   BILIRUBIN UA  Negative   UROBILINOGEN UA mg/dL Negative   LEUKOCYTES UA  100 0*   WBC UA /hpf 20-30*   RBC UA /hpf 20-30*   BACTERIA UA /hpf Occasional   EPITHELIAL CELLS WET PREP /hpf Moderate*   MUCUS THREADS  Occasional*     Results from last 7 days   Lab Units 06/19/22  0315   INFLUENZA A PCR  Negative   INFLUENZA B PCR  Negative     ED Treatment:   Medication Administration from 06/21/2022 2221 to 06/22/2022 1436    Date/Time Order Dose Route Action   06/22/2022 0021 lactated ringers infusion 1,000 mL 1,000 mL Intravenous New Bag   06/22/2022 0024 aspirin chewable tablet 324 mg 243 mg Oral Given 06/22/2022 0021 heparin (porcine) injection 4,000 Units 4,000 Units Intravenous Given   06/22/2022 0747 heparin (porcine) 25,000 units in 0 45% NaCl 250 mL infusion (premix) 13 1 Units/kg/hr Intravenous Rate/Dose Change   06/22/2022 0022 heparin (porcine) 25,000 units in 0 45% NaCl 250 mL infusion (premix) 11 1 Units/kg/hr Intravenous New Bag   06/22/2022 0744 heparin (porcine) injection 2,000 Units 2,000 Units Intravenous Given   06/22/2022 0958 allopurinol (ZYLOPRIM) tablet 300 mg 300 mg Oral Given   06/22/2022 0959 citalopram (CeleXA) tablet 40 mg 40 mg Oral Given   06/22/2022 0958 clopidogrel (PLAVIX) tablet 75 mg 75 mg Oral Given   06/22/2022 1004 insulin glargine (LANTUS) subcutaneous injection 25 Units 0 25 mL 25 Units Subcutaneous Given   06/22/2022 0631 levothyroxine tablet 50 mcg 50 mcg Oral Given   06/22/2022 0958 isosorbide mononitrate (IMDUR) 24 hr tablet 60 mg 60 mg Oral Given   06/22/2022 0631 ondansetron (ZOFRAN) injection 4 mg 4 mg Intravenous Given   06/22/2022 5994 sodium chloride 0 9 % infusion 100 mL/hr Intravenous New Bag   06/22/2022 1143 sodium chloride 0 9 % infusion 75 mL/hr Intravenous New Bag        Past Medical History:   Diagnosis Date    Abnormal liver function     Anemia     Anxiety     Arthritis     Chronic kidney disease     stage 3    Chronic narcotic dependence (HCC)     Chronic pain disorder     lower back, hands , neck and knees    Coronary artery disease     Depression     Diabetes mellitus ([facility] [address] )     Elevated troponin 2/11/2022    GERD (gastroesophageal reflux disease)     no meds at present    Heart murmur     murmur    Hyperlipidemia     Hypertension     Neurogenic bladder     Open toe wound 12/2020    right big toe open calus but is dry at present    Renal disorder     Shortness of breath     exertion    Sleep apnea     doesn't use cpap    Suprapubic catheter ([facility] [address] )      Present on Admission:   Chronic combined systolic and diastolic CHF (congestive heart failure) ([facility])   CAD (coronary artery disease)      Admitting Diagnosis: Bradycardia [R00 1]  Syncope [R55]  Hypotension [I95 9]  Neurogenic bladder [N31 9]  NSTEMI (non-ST elevated myocardial infarction) ([place] [address] ) [I21 4]  Chronic anemia [D64 9]  ALFRED (acute kidney injury) ([place] [address] ) [N17 9]  Elevated troponin I level [R77 8]  Acute renal failure superimposed on chronic kidney disease ([place] [address] ) [N17 9, N18 9]  Age/Sex: 63 y o  female  Admission Orders:  Scheduled Medications:  allopurinol, 300 mg, Oral, Daily  atorvastatin, 40 mg, Oral, Daily With Dinner  citalopram, 40 mg, Oral, Daily  clopidogrel, 75 mg, Oral, Daily  insulin glargine, 25 Units, Subcutaneous, Q12H [address]  insulin lispro, 1-6 Units, Subcutaneous, 4x Daily (AC & HS)  isosorbide mononitrate, 60 mg, Oral, Daily  levothyroxine, 50 mcg, Oral, Early Morning  OLANZapine, 5 mg, Oral, HS      Continuous IV Infusions:  heparin (porcine), 3-20 Units/kg/hr (Order-Specific), Intravenous, Titrated  sodium chloride, 75 mL/hr, Intravenous, Continuous      PRN Meds:  acetaminophen, 650 mg, Oral, Q6H PRN - x 1 6/22, x 3 6/23  heparin (porcine), 2,000 Units, Intravenous, Q1H PRN - x 1 6/22, 6/23  heparin (porcine), 4,000 Units, Intravenous, Q1H PRN -x 1 6/22  ondansetron, 4 mg, Intravenous, Q6H PRN -x 2 6/22  Oxycodone IR 5 mg PO q 4 hr PRN - x 1 6/23    Tele   Heparin drip  POC GLUCOSE AC/HS WITH SSI COVERAGE   Vitals q 4 hr   Daily wt  IP CONSULT TO CARDIOLOGY  IP CONSULT TO NEPHROLOGY    Network Utilization Review Department  ATTENTION: Please call with any questions or concerns to [phone] and carefully listen to the prompts so that you are directed to the right person  All voicemails are confidential   Shreya Khan all requests for admission clinical reviews, approved or denied determinations and any other requests to dedicated fax number below belonging to the campus where the patient is receiving treatment   List of dedicated fax numbers for the Facilities:  [address] FAX NUMBER   ADMISSION DENIALS (Administrative/Medical Necessity) [phone]   [address] (Maternity/NICU/Pediatrics) [address] [facility],[address]  [phone]   [address] [facility] [place] [address] [place] [address] [id number] [place] [address] [address]inez [address] [facility] [address] [facility] [phone]

## 2022-06-24 ENCOUNTER — RA CDI HCC (OUTPATIENT)
Dept: OTHER | Facility: HOSPITAL | Age: 62
End: 2022-06-24

## 2022-06-24 LAB
ANION GAP SERPL CALCULATED.3IONS-SCNC: 9 MMOL/L (ref 4–13)
APTT PPP: 111 SECONDS (ref 23–37)
BASOPHILS # BLD AUTO: 0.07 THOUSANDS/ΜL (ref 0–0.1)
BASOPHILS NFR BLD AUTO: 1 % (ref 0–1)
BUN SERPL-MCNC: 86 MG/DL (ref 5–25)
CALCIUM SERPL-MCNC: 7.5 MG/DL (ref 8.3–10.1)
CHLORIDE SERPL-SCNC: 97 MMOL/L (ref 100–108)
CHOLEST SERPL-MCNC: 120 MG/DL
CO2 SERPL-SCNC: 22 MMOL/L (ref 21–32)
CREAT SERPL-MCNC: 4.18 MG/DL (ref 0.6–1.3)
EOSINOPHIL # BLD AUTO: 0.36 THOUSAND/ΜL (ref 0–0.61)
EOSINOPHIL NFR BLD AUTO: 3 % (ref 0–6)
ERYTHROCYTE [DISTWIDTH] IN BLOOD BY AUTOMATED COUNT: 15.3 % (ref 11.6–15.1)
GFR SERPL CREATININE-BSD FRML MDRD: 10 ML/MIN/1.73SQ M
GLUCOSE SERPL-MCNC: 199 MG/DL (ref 65–140)
GLUCOSE SERPL-MCNC: 214 MG/DL (ref 65–140)
GLUCOSE SERPL-MCNC: 226 MG/DL (ref 65–140)
GLUCOSE SERPL-MCNC: 264 MG/DL (ref 65–140)
GLUCOSE SERPL-MCNC: 317 MG/DL (ref 65–140)
HCT VFR BLD AUTO: 24.6 % (ref 34.8–46.1)
HDLC SERPL-MCNC: 42 MG/DL
HGB BLD-MCNC: 8.1 G/DL (ref 11.5–15.4)
IMM GRANULOCYTES # BLD AUTO: 0.11 THOUSAND/UL (ref 0–0.2)
IMM GRANULOCYTES NFR BLD AUTO: 1 % (ref 0–2)
LDLC SERPL CALC-MCNC: 50 MG/DL (ref 0–100)
LYMPHOCYTES # BLD AUTO: 1.9 THOUSANDS/ΜL (ref 0.6–4.47)
LYMPHOCYTES NFR BLD AUTO: 15 % (ref 14–44)
MCH RBC QN AUTO: 33.1 PG (ref 26.8–34.3)
MCHC RBC AUTO-ENTMCNC: 32.9 G/DL (ref 31.4–37.4)
MCV RBC AUTO: 100 FL (ref 82–98)
MONOCYTES # BLD AUTO: 1.01 THOUSAND/ΜL (ref 0.17–1.22)
MONOCYTES NFR BLD AUTO: 8 % (ref 4–12)
NEUTROPHILS # BLD AUTO: 9.34 THOUSANDS/ΜL (ref 1.85–7.62)
NEUTS SEG NFR BLD AUTO: 72 % (ref 43–75)
NRBC BLD AUTO-RTO: 0 /100 WBCS
PLATELET # BLD AUTO: 274 THOUSANDS/UL (ref 149–390)
PMV BLD AUTO: 10.3 FL (ref 8.9–12.7)
POTASSIUM SERPL-SCNC: 4.1 MMOL/L (ref 3.5–5.3)
RBC # BLD AUTO: 2.45 MILLION/UL (ref 3.81–5.12)
SODIUM SERPL-SCNC: 128 MMOL/L (ref 136–145)
TRIGL SERPL-MCNC: 138 MG/DL
WBC # BLD AUTO: 12.79 THOUSAND/UL (ref 4.31–10.16)

## 2022-06-24 PROCEDURE — 82948 REAGENT STRIP/BLOOD GLUCOSE: CPT

## 2022-06-24 PROCEDURE — 80061 LIPID PANEL: CPT | Performed by: PHYSICIAN ASSISTANT

## 2022-06-24 PROCEDURE — 85025 COMPLETE CBC W/AUTO DIFF WBC: CPT | Performed by: INTERNAL MEDICINE

## 2022-06-24 PROCEDURE — 99232 SBSQ HOSP IP/OBS MODERATE 35: CPT | Performed by: INTERNAL MEDICINE

## 2022-06-24 PROCEDURE — 85730 THROMBOPLASTIN TIME PARTIAL: CPT | Performed by: INTERNAL MEDICINE

## 2022-06-24 PROCEDURE — 97163 PT EVAL HIGH COMPLEX 45 MIN: CPT

## 2022-06-24 PROCEDURE — 80048 BASIC METABOLIC PNL TOTAL CA: CPT | Performed by: PHYSICIAN ASSISTANT

## 2022-06-24 RX ORDER — HYDROCODONE BITARTRATE AND ACETAMINOPHEN 5; 325 MG/1; MG/1
1 TABLET ORAL ONCE
Status: COMPLETED | OUTPATIENT
Start: 2022-06-24 | End: 2022-06-24

## 2022-06-24 RX ORDER — HEPARIN SODIUM 5000 [USP'U]/ML
5000 INJECTION, SOLUTION INTRAVENOUS; SUBCUTANEOUS EVERY 8 HOURS SCHEDULED
Status: DISCONTINUED | OUTPATIENT
Start: 2022-06-24 | End: 2022-07-02 | Stop reason: HOSPADM

## 2022-06-24 RX ORDER — LIDOCAINE HYDROCHLORIDE 20 MG/ML
10 SOLUTION OROPHARYNGEAL 4 TIMES DAILY PRN
Status: DISCONTINUED | OUTPATIENT
Start: 2022-06-24 | End: 2022-06-25

## 2022-06-24 RX ORDER — INSULIN GLARGINE 100 [IU]/ML
28 INJECTION, SOLUTION SUBCUTANEOUS EVERY 12 HOURS SCHEDULED
Status: DISCONTINUED | OUTPATIENT
Start: 2022-06-24 | End: 2022-06-25

## 2022-06-24 RX ADMIN — ACETAMINOPHEN 325MG 650 MG: 325 TABLET ORAL at 21:39

## 2022-06-24 RX ADMIN — INSULIN LISPRO 2 UNITS: 100 INJECTION, SOLUTION INTRAVENOUS; SUBCUTANEOUS at 12:37

## 2022-06-24 RX ADMIN — CLOPIDOGREL BISULFATE 75 MG: 75 TABLET ORAL at 08:02

## 2022-06-24 RX ADMIN — INSULIN LISPRO 5 UNITS: 100 INJECTION, SOLUTION INTRAVENOUS; SUBCUTANEOUS at 22:43

## 2022-06-24 RX ADMIN — OXYCODONE HYDROCHLORIDE 5 MG: 5 TABLET ORAL at 21:19

## 2022-06-24 RX ADMIN — OXYCODONE HYDROCHLORIDE 5 MG: 5 TABLET ORAL at 15:17

## 2022-06-24 RX ADMIN — HEPARIN SODIUM 5000 UNITS: 5000 INJECTION INTRAVENOUS; SUBCUTANEOUS at 21:39

## 2022-06-24 RX ADMIN — INSULIN GLARGINE 28 UNITS: 100 INJECTION, SOLUTION SUBCUTANEOUS at 21:15

## 2022-06-24 RX ADMIN — ACETAMINOPHEN 325MG 650 MG: 325 TABLET ORAL at 08:02

## 2022-06-24 RX ADMIN — ACETAMINOPHEN 325MG 650 MG: 325 TABLET ORAL at 15:18

## 2022-06-24 RX ADMIN — HYDROCODONE BITARTRATE AND ACETAMINOPHEN 1 TABLET: 5; 325 TABLET ORAL at 01:17

## 2022-06-24 RX ADMIN — INSULIN LISPRO 2 UNITS: 100 INJECTION, SOLUTION INTRAVENOUS; SUBCUTANEOUS at 08:01

## 2022-06-24 RX ADMIN — INSULIN GLARGINE 25 UNITS: 100 INJECTION, SOLUTION SUBCUTANEOUS at 08:08

## 2022-06-24 RX ADMIN — INSULIN LISPRO 3 UNITS: 100 INJECTION, SOLUTION INTRAVENOUS; SUBCUTANEOUS at 16:48

## 2022-06-24 RX ADMIN — LEVOTHYROXINE SODIUM 50 MCG: 50 TABLET ORAL at 06:57

## 2022-06-24 RX ADMIN — OLANZAPINE 5 MG: 5 TABLET, FILM COATED ORAL at 21:15

## 2022-06-24 RX ADMIN — ASPIRIN 81 MG CHEWABLE TABLET 81 MG: 81 TABLET CHEWABLE at 08:02

## 2022-06-24 RX ADMIN — CITALOPRAM HYDROBROMIDE 40 MG: 20 TABLET ORAL at 08:02

## 2022-06-24 RX ADMIN — ALLOPURINOL 300 MG: 300 TABLET ORAL at 08:02

## 2022-06-24 RX ADMIN — ISOSORBIDE MONONITRATE 60 MG: 60 TABLET, EXTENDED RELEASE ORAL at 08:03

## 2022-06-24 RX ADMIN — ATORVASTATIN CALCIUM 40 MG: 40 TABLET, FILM COATED ORAL at 15:40

## 2022-06-24 RX ADMIN — OXYCODONE HYDROCHLORIDE 5 MG: 5 TABLET ORAL at 08:02

## 2022-06-24 NOTE — PLAN OF CARE
Problem: PHYSICAL THERAPY ADULT  Goal: Performs mobility at highest level of function for planned discharge setting  See evaluation for individualized goals  Description: Treatment/Interventions: Functional transfer training, LE strengthening/ROM, Elevations, Therapeutic exercise, Endurance training, Patient/family training, Equipment eval/education, Bed mobility, Gait training, Compensatory technique education, Spoke to nursing  Equipment Recommended:  (pt owns rollator)       See flowsheet documentation for full assessment, interventions and recommendations  Note: Prognosis: Good  Problem List: Decreased strength, Decreased endurance, Impaired balance, Decreased mobility, Obesity  Assessment: Pt is a 63 y o  female seen for PT evaluation s/p admission to [address] [place] [address] on 6/21/2022 with Syncope  Order placed for PT services  Upon evaluation: Pt is presenting with impaired functional mobility due to pain, decreased strength, decreased endurance, impaired balance, gait deviations, and fall risk requiring  supervision assistance for transfers and ambulation with RW   Pt's clinical presentation is currently unstable/unpredictable given the functional mobility deficits above coupled with fall risks as indicated by AM-PAC 6-Clicks: 75/43 as well as hx of falls, impaired balance, polypharmacy, and limited sensation/neuropathy and combined with medical complications of hypertension , abnormal renal lab values, abnormal H&H, abnormal WBCs, abnormal sodium values and need for input for mobility technique/safety  Pt's PMHx and comorbidities that may affect physical performance and progress include: CHF, CKD, DM, HTN, CAD and obesity   Personal factors affecting pt at time of IE include: anxiety, depression, inaccessible home environment, step(s) to enter environment, multi-level environment, inability to perform IADLs, inability to navigate level surfaces without external assistance, inability to navigate community distances and recent fall(s)/fall history  Pt will benefit from continued skilled PT services to address deficits as defined above and to maximize level of functional mobility to facilitate return toward PLOF and improved QOL  From PT/mobility standpoint, recommendation at time of d/c would be Home PT pending progress in order to reduce fall risk and maximize pt's functional independence and consistency with mobility in order to facilitate return to PLOF  Recommend trial with walker next 1-2 sessions and ther ex next 1-2 sessions  Barriers to Discharge: Inaccessible home environment  Barriers to Discharge Comments: 4-6 DOROTHEA     PT Discharge Recommendation: Home with home health rehabilitation          See flowsheet documentation for full assessment

## 2022-06-24 NOTE — PROGRESS NOTES
NEPHROLOGY PROGRESS NOTE   Kalpana Dozier 63 y o  female MRN: [id number]  Unit/Bed#: E4 -01 Encounter: [id number]        ASSESSMENT & PLAN    65-year-old female with a history of stage 4 chronic kidney disease with a baseline creatinine of around 2 5-2 9, history of nephrotic range proteinuria, uncontrolled type 2 diabetes, hypertension, coronary artery disease and chronic combined systolic and diastolic congestive heart failure, chronic suprapubic catheter who presented with syncope and chest pain complicated by acute kidney injury     1  Acute kidney injury-present on admission likely pre renal azotemia that has transition to ATN versus cardiorenal syndrome   · She has some crackles in the bases on the right side and her blood pressures are stable she is making good urine and tolerating p o  Intake   · She has no respiratory distress at this point so will continue to hold diuretics although can be given if acutely short of breath  · Her urinalysis does show blood leukocytes but she has no signs an overt infection and she has a suprapubic catheter so will monitor  · Still making good urine  · Check BMPs daily  · If catheterization non Urgent would hold until creatinine in steady state as she is in high risk for progression  · Creatinine is stabilized and hopefully continues to trend downward      2  Stage 4 chronic kidney disease  · she has a baseline creatinine at 2 5-2 9   · she is on torsemide as an outpatient   · this is likely secondary to diabetic kidney disease and hypertensive nephrosclerosis plus-minus cardiorenal syndrome and multiple episodes of acute kidney injury   She is at high risk for progression       3  Hypertension  · -she is currently on Imdur 60 mg daily avoid hypotension the setting of acute kidney injury       4  Hyponatremia  · -appears to be hypovolemic at this point remains on normal saline running at 75 cc an hour       5   Chest pain with elevated troponin in the setting of advanced cardiomyopathy and acute kidney injury  · ongoing cardiology workup  will require cardiac catheterization  · Echo shows reduced ejection fraction to 30%    SUBJECTIVE:    Patient was seen today  She is sitting in her chair she is tolerating p o  Intake  She has no shortness of breath no fevers or chills  No chest pain    12 point review of systems was otherwise negative besides what is mentioned above      Medications:    Current Facility-Administered Medications:     acetaminophen (TYLENOL) tablet 650 mg, 650 mg, Oral, Q6H PRN, Indira Bay MD, 650 mg at 06/24/22 0802    allopurinol (ZYLOPRIM) tablet 300 mg, 300 mg, Oral, Daily, Debi House PA-C, 300 mg at 06/24/22 0802    aspirin chewable tablet 81 mg, 81 mg, Oral, Daily, Sheri Martin DO, 81 mg at 06/24/22 0802    atorvastatin (LIPITOR) tablet 40 mg, 40 mg, Oral, Daily With Denise House PA-C, 40 mg at 06/23/22 1725    citalopram (CeleXA) tablet 40 mg, 40 mg, Oral, Daily, Debi House PA-C, 40 mg at 06/24/22 0802    clopidogrel (PLAVIX) tablet 75 mg, 75 mg, Oral, Daily, Debi House PA-C, 75 mg at 06/24/22 0802    insulin glargine (LANTUS) subcutaneous injection 25 Units 0 25 mL, 25 Units, Subcutaneous, Q12H [address], Debi House PA-C, 25 Units at 06/24/22 0808    insulin lispro (HumaLOG) 100 units/mL subcutaneous injection 1-6 Units, 1-6 Units, Subcutaneous, 4x Daily (AC & HS), 2 Units at 06/24/22 1237 **AND** Fingerstick Glucose (POCT), , , 4x Daily AC and at bedtime, Debi House PA-C    isosorbide mononitrate (IMDUR) 24 hr tablet 60 mg, 60 mg, Oral, Daily, Debi House PA-C, 60 mg at 06/24/22 0803    levothyroxine tablet 50 mcg, 50 mcg, Oral, Early Morning, Debi House PA-C, 50 mcg at 06/24/22 0657    OLANZapine (ZyPREXA) tablet 5 mg, 5 mg, Oral, HS, Debi House PA-C, 5 mg at 06/23/22 2155    ondansetron (ZOFRAN) injection 4 mg, 4 mg, Intravenous, Q6H PRN, Rogerio Cord SETH House, 4 mg at 06/22/22 1755    oxyCODONE (ROXICODONE) IR tablet 5 mg, 5 mg, Oral, Q4H PRN, Corazon Meyers MD, 5 mg at 06/24/22 0802    OBJECTIVE:    Vitals:    06/24/22 0720 06/24/22 1255 06/24/22 1259 06/24/22 1303   BP: (!) 177/78 112/56 138/63 137/65   BP Location: Left arm Right arm Right arm Right arm   Pulse: 59 61 69 66   Resp: 18 18 18 18   Temp: (!) 96 9 °F (36 1 °C)      TempSrc: Temporal      SpO2: 99% 95% 95% 97%   Weight:       Height:            Temp:  [96 8 °F (36 °C)-98 3 °F (36 8 °C)] 96 9 °F (36 1 °C)  HR:  [51-69] 66  Resp:  [16-18] 18  BP: (112-177)/(56-78) 137/65  SpO2:  [94 %-99 %] 97 %     Body mass index is 43 71 kg/m²      Weight (last 2 days)     Date/Time Weight    06/24/22 0551 130 (287 48)    06/23/22 0600 129 (285 5)    06/22/22 1342 130 (286)          I/O last 3 completed shifts:  In: -   Out: 1600 [Urine:1600]    I/O this shift:  In: -   Out: 1700 [Urine:1700]      Physical exam:    General: no acute distress, cooperative  Eyes: conjunctivae pink, anicteric sclerae  ENT: lips and mucous membranes moist, no exudates, normal external ears  Neck: ROM intact, no JVD  Chest:  Mild crackles in the bases  CVS: normal rate, non pericardial friction rub  Abdomen: soft, non-tender, non-distended, normoactive bowel sounds  Extremities:  Trace edema  Skin:  Chronic skin changes  Neuro: awake, alert, oriented, grossly intact  Psych:  Pleasant affect    Invasive Devices:      Lab Results:   Results from last 7 days   Lab Units 06/24/22  0421 06/23/22  0500 06/22/22  0637 06/21/22  2249 06/19/22  0352 06/19/22  0315   WBC Thousand/uL 12 79* 11 81* 12 97* 13 06*  --  11 04*   HEMOGLOBIN g/dL 8 1* 8 1* 8 1* 8 4*  --  9 7*   HEMATOCRIT % 24 6* 24 9* 24 8* 26 3*  --  30 5*   PLATELETS Thousands/uL 274 255 265 276  --  216   POTASSIUM mmol/L 4 1 4 2 4 2 4 2  --  4 9   CHLORIDE mmol/L 97* 95* 97* 96*  --  109*   CO2 mmol/L 22 21 22 24  --  22   BUN mg/dL 86* 88* 81* 78*  --  44*   CREATININE mg/dL 4 18* 4 52* 4 37* 4 23*  --  2 14*   CALCIUM mg/dL 7 5* 7 5* 7 9* 7 9*  --  8 7   MAGNESIUM mg/dL  --   --   --   --   --  2 5*   ALK PHOS U/L  --   --  100 103  --  93   ALT U/L  --   --  137* 110*  --  21   AST U/L  --   --  110* 92*  --  52*   LEUKOCYTES UA   --   --   --   --  100 0*  --    BLOOD UA   --   --   --   --  150 0*  --          Portions of the record may have been created with voice recognition software  Occasional wrong word or "sound a like" substitutions may have occurred due to the inherent limitations of voice recognition software  Read the chart carefully and recognize, using context, where substitutions have occurred  If you have any questions, please contact the dictating provider

## 2022-06-24 NOTE — ASSESSMENT & PLAN NOTE
· Patient presented with recent nausea, poor p o  Intake, and diarrhea  · Likely secondary to viral gastroenteritis  · No evidence of acute abdomen  · All symptoms resolved  She is currently tolerating p o    · Continue supportive measures

## 2022-06-24 NOTE — ASSESSMENT & PLAN NOTE
Wt Readings from Last 3 Encounters:   06/24/22 130 kg (287 lb 7 7 oz)   06/19/22 128 kg (282 lb)   04/13/22 128 kg (282 lb)     · Patient has a history of chronic combined systolic and diastolic congestive heart failure  · 2D echocardiogram with left ventricular ejection fraction 30%  Akinesis of basal inferior septal, mid inferior septal, mid inferior, and apical inferior walls    · Compared to previous echocardiogram from 08/21 ejection fraction had been 45%  · Home torsemide on hold due to volume depletion and orthostasis  · For cardiology: Repeat echocardiogram on Monday to see if decreased ejection fraction on admission secondary to stress cardiomyopathy  · Patient also being evaluated for possible future cardiac catheterization to evaluate ischemic component, if creatinine improves

## 2022-06-24 NOTE — ASSESSMENT & PLAN NOTE
· Patient presented for evaluation of chest pain  · Patient has a history of coronary artery disease, with previous PCI x5, with stents in the LAD and circumflex artery, and chronic total occlusion of the RCA at the ostium  · Follows with the cardiologist Dr Erika Li in the office:  Most recently 4/13, notes reviewed, patient was not having any unstable angina  Option of future Ranexa addition to her regimen was entertained if she were to have angina  · Most recent cardiac catheterization 8/21:  Revealed 100% chronic total occlusion of proximal RCA, 30% stenosis proximal LAD at the site of prior stent, mid LAD stent, distal LAD stent, proximal circumflex stent patent  · Patient presented with near syncope, chest pain and elevated troponin that peaked at 8,542  · She was evaluated the cardiology team who recommended echocardiogram   · 2D echo with decreased ejection fraction of 30%, decreased from 45% last year  New wall motion abnormalities  · Continue Plavix, statin, isosorbide    No beta-blocker due to bradycardia  · Per Cardiology:  Plan for repeat echocardiogram Monday to see if decreased ejection fraction and wall motion abnormalities were secondary to stress-induced cardiomyopathy  · Due to acute kidney injury, catheterization deferred  · Heparin infusion discontinued  · Cardiology recommends changing Plavix to Brilinta if insurance covered

## 2022-06-24 NOTE — ASSESSMENT & PLAN NOTE
Lab Results   Component Value Date    EGFR 10 06/24/2022    EGFR 9 06/23/2022    EGFR 10 06/22/2022    CREATININE 4 18 (H) 06/24/2022    CREATININE 4 52 (H) 06/23/2022    CREATININE 4 37 (H) 06/22/2022     · Patient has a history of chronic kidney disease stage 4 with baseline creatinine 2 5-2 9  · Follows with [facility] as an outpatient  · Patient presented with acute kidney injury  · She was evaluated by the nephrology team   Acute kidney injury felt to be prerenal azotemia, secondary to volume depletion, dehydration due to recent GI losses, in the setting of chronic diuretic use: has progressed to ATN  · Status post IV fluids and hold torsemide  · Renal ultrasound without significant hydronephrosis  · Creatinine slightly improved today  · Cardiac catheterization on hold

## 2022-06-24 NOTE — ASSESSMENT & PLAN NOTE
Lab Results   Component Value Date    HGBA1C 7 3 (A) 03/29/2022       Recent Labs     06/23/22  2118 06/24/22  0718 06/24/22  1130 06/24/22  1550   POCGLU 305* 199* 226* 264*       Blood Sugar Average: Last 72 hrs:  (P) 189   · Patient has history of diabetes type 2, with long-term use of insulin, with associated chronic kidney disease stage  · Home regimen includes lantus 50 iu bid and scheduled humalog 20 t i d   Plus Trulicity once a week  · Patient's regimen was decreased while in the hospital to avoid hypoglycemia  · Will increase due to hyperglycemia  · Continue to monitor and titrate as needed

## 2022-06-24 NOTE — ASSESSMENT & PLAN NOTE
· Patient presented with hypotensive, and syncope in the setting of recent GI losses  · Patient is given IV fluids with improvement in blood pressure  · Continue to hold diuretic  · IV fluids under the guidance of the nephrology team  · Beta-blocker on hold for hypotension and bradycardia  · Blood pressure adequately controlled today

## 2022-06-24 NOTE — PROGRESS NOTES
[facility]  Progress Note - Osmani Bagley 1960, 63 y o  female MRN: [id number]  Unit/Bed#: E4 -01 Encounter: [id number]  Primary Care Provider: CHERELLE Scott   Date and time admitted to hospital: 6/21/2022 10:21 PM    * Syncope  Assessment & Plan  · Patient was admitted after syncopal episode with associated hypotension  · Patient is noted be volume depleted, in acute kidney injury  · Home torsemide on hold  · Gentle IV fluids under the guidance of the cardiology and nephrology team  · Patient with possible ischemia with due to new wall motion abnormalities on echocardiogram, and elevated troponin of 8000  · No additional symptoms since admission  · Negative orthostatics after fluid    Acute renal failure superimposed on chronic kidney disease [facility])  Assessment & Plan  Lab Results   Component Value Date    EGFR 10 06/24/2022    EGFR 9 06/23/2022    EGFR 10 06/22/2022    CREATININE 4 18 (H) 06/24/2022    CREATININE 4 52 (H) 06/23/2022    CREATININE 4 37 (H) 06/22/2022     · Patient has a history of chronic kidney disease stage 4 with baseline creatinine 2 5-2 9  · Follows with [facility] as an outpatient  · Patient presented with acute kidney injury  · She was evaluated by the nephrology team   Acute kidney injury felt to be prerenal azotemia, secondary to volume depletion, dehydration due to recent GI losses, in the setting of chronic diuretic use: has progressed to ATN  · Status post IV fluids and hold torsemide  · Renal ultrasound without significant hydronephrosis  · Creatinine slightly improved today  · Cardiac catheterization on hold    CAD (coronary artery disease)  Assessment & Plan  · Patient presented for evaluation of chest pain  · Patient has a history of coronary artery disease, with previous PCI x5, with stents in the LAD and circumflex artery, and chronic total occlusion of the RCA at the ostium  · Follows with the cardiologist   Niya Zhu in the office:  Most recently 4/13, notes reviewed, patient was not having any unstable angina  Option of future Ranexa addition to her regimen was entertained if she were to have angina  · Most recent cardiac catheterization 8/21:  Revealed 100% chronic total occlusion of proximal RCA, 30% stenosis proximal LAD at the site of prior stent, mid LAD stent, distal LAD stent, proximal circumflex stent patent  · Patient presented with near syncope, chest pain and elevated troponin that peaked at 8,542  · She was evaluated the cardiology team who recommended echocardiogram   · 2D echo with decreased ejection fraction of 30%, decreased from 45% last year  New wall motion abnormalities  · Continue Plavix, statin, isosorbide    No beta-blocker due to bradycardia  · Per Cardiology:  Plan for repeat echocardiogram Monday to see if decreased ejection fraction and wall motion abnormalities were secondary to stress-induced cardiomyopathy  · Due to acute kidney injury, catheterization deferred  · Heparin infusion discontinued  · Cardiology recommends changing Plavix to Brilinta if insurance covered    Headache  Assessment & Plan  · Patient notes pain over both maxillary sinuses, sensation similar to previous sinus infection  · No focal neurologic signs symptoms or deficits  · CT scan the brain negative for sinusitis    Bradycardia  Assessment & Plan  · Patient noted to have bradycardia on telemetry,  · Home beta-blocker on hold  · Appreciate cardiology evaluation and recommendations    History of anemia due to chronic kidney disease  Assessment & Plan  · Patient has a history of anemia secondary to chronic kidney disease  · On oral iron as an outpatient  · Continue monitor    Hyponatremia  Assessment & Plan  · Hypovolemic hyponatremia, likely secondary to recent GI losses  · Status post isotonic IV fluid  · Hold torsemide  · Appreciate nephrology evaluation and recommendations    Gastroenteritis  Assessment & Plan  · Patient presented with recent nausea, poor p o  Intake, and diarrhea  · Likely secondary to viral gastroenteritis  · No evidence of acute abdomen  · All symptoms resolved  She is currently tolerating p o  · Continue supportive measures    Hypotension  Assessment & Plan  · Patient presented with hypotensive, and syncope in the setting of recent GI losses  · Patient is given IV fluids with improvement in blood pressure  · Continue to hold diuretic  · IV fluids under the guidance of the nephrology team  · Beta-blocker on hold for hypotension and bradycardia  · Blood pressure adequately controlled today    Chronic combined systolic and diastolic CHF (congestive heart failure) ([facility] [address] )  Assessment & Plan  Wt Readings from Last 3 Encounters:   06/24/22 130 kg (287 lb 7 7 oz)   06/19/22 128 kg (282 lb)   04/13/22 128 kg (282 lb)     · Patient has a history of chronic combined systolic and diastolic congestive heart failure  · 2D echocardiogram with left ventricular ejection fraction 30%  Akinesis of basal inferior septal, mid inferior septal, mid inferior, and apical inferior walls    · Compared to previous echocardiogram from 08/21 ejection fraction had been 45%  · Home torsemide on hold due to volume depletion and orthostasis  · For cardiology: Repeat echocardiogram on Monday to see if decreased ejection fraction on admission secondary to stress cardiomyopathy  · Patient also being evaluated for possible future cardiac catheterization to evaluate ischemic component, if creatinine improves    Type 2 diabetes mellitus with stage 4 chronic kidney disease, with long-term current use of insulin [facility])  Assessment & Plan  Lab Results   Component Value Date    HGBA1C 7 3 (A) 03/29/2022       Recent Labs     06/23/22  2118 06/24/22  0718 06/24/22  1130 06/24/22  1550   POCGLU 305* 199* 226* 264*       Blood Sugar Average: Last 72 hrs:  (P) 189   · Patient has history of diabetes type 2, with long-term use of insulin, with associated chronic kidney disease stage  · Home regimen includes lantus 50 iu bid and scheduled humalog 20 t i d  Plus Trulicity once a week  · Patient's regimen was decreased while in the hospital to avoid hypoglycemia  · Will increase due to hyperglycemia  · Continue to monitor and titrate as needed            Family:  Called daughter Elton Monk    dw pts nurse  dw     VTE Pharmacologic Prophylaxis: heparin  VTE Mechanical Prophylaxis: sequential compression device        Certification Statement: The patient will continue to require additional inpatient hospital stay due to need for further acute intervention for ALFRED    Status: inpatient     ===================================================================    Subjective:  Pt denies any chest pain  Denies any sob/rahman  Ambulated w/o dizziness, or lightheadedness  Denies HA  Notes pain on roof of mouth  Tolerating po  No sorethroat, dysphagia, odynophagia  Denies any n/v/d  Notes clear urine in her suprapubic tube      Physical Exam:   Temp:  [96 8 °F (36 °C)-98 3 °F (36 8 °C)] 96 9 °F (36 1 °C)  HR:  [55-69] 64  Resp:  [16-18] 18  BP: (112-177)/(56-78) 158/68    Gen:  Pleasant, non-tachypnic, non-dyspnic  Conversant  Sitting up in a chair at the bedside  Smiling and interactive  Heart: regular rate and rhythm, S1S2 present, no murmur, rub or gallop  Lungs: clear to ausculatation bilaterally  No wheezing, crackles, or rhonchi  No accessory muscle use or respiratory distress  Good air movement  Abd: soft, non-tender, non-distended  NABS, no guarding, rebound or peritoneal signs  :  Suprapubic tube with clear light yellow urine  Extremities: no clubbing, cyanosis or edema  2+pedal pulses bilaterally  Neuro: awake, alert and oriented  Fluent speech  Interactive  Skin: warm and dry: no petechiae, purpura and rash      LABS:   Results from last 7 days   Lab Units 06/24/22  0421 06/23/22  0500 06/22/22  0637   WBC Thousand/uL 12 79* 11 81* 12 97*   HEMOGLOBIN g/dL 8 1* 8 1* 8 1*   HEMATOCRIT % 24 6* 24 9* 24 8*   PLATELETS Thousands/uL 274 255 265     Results from last 7 days   Lab Units 06/24/22  0421 06/23/22  0500 06/22/22  0637   POTASSIUM mmol/L 4 1 4 2 4 2   CHLORIDE mmol/L 97* 95* 97*   CO2 mmol/L 22 21 22   BUN mg/dL 86* 88* 81*   CREATININE mg/dL 4 18* 4 52* 4 37*   CALCIUM mg/dL 7 5* 7 5* 7 [address] [place] [address] Data:  6/23:  Renal ultrasound:     1   No gross hydronephrosis  Please note evaluation of the left kidney is markedly limited  2  Urinary bladder is decompressed around a suprapubic catheter, limiting its evaluation    6/22 echocardiogram:    Left Ventricle: Left ventricle is not well visualized  Wall thickness is normal  The left ventricular ejection fraction is 30% by visual estimation  Systolic function is severely reduced    The following segments are akinetic: basal inferoseptal, mid inferoseptal, mid inferior and apical inferior    The following segments are hypokinetic: basal anteroseptal, basal inferior, mid anteroseptal, mid inferolateral, apical septal, apical lateral and apex    All other segments are normal     Left Atrium: The atrium is mildly dilated (35-41 mL/m2)    Mitral Valve: The mitral valve was not well visualized    Tricuspid Valve: There is mild regurgitation  The right ventricular systolic pressure is mildly elevated  The estimated right ventricular systolic pressure is 60 75 mmHg    Pulmonic Valve: There is mild regurgitation  Compared to prior study the LV function has worsened and there are new regional wall motion abnormalities           6/22:  Chest x-ray:Development of mild cardiomegaly and right basal infiltrate/atelectasis        Microbiology  6/21:  Negative COVID, influenza          ---------------------------------------------------------------------------------------------------------------  This note has been constructed using a voice recognition system

## 2022-06-24 NOTE — PLAN OF CARE
Problem: MOBILITY - ADULT  Goal: Maintain or return to baseline ADL function  Description: INTERVENTIONS:  -  Assess patient's ability to carry out ADLs; assess patient's baseline for ADL function and identify physical deficits which impact ability to perform ADLs (bathing, care of mouth/teeth, toileting, grooming, dressing, etc )  - Assess/evaluate cause of self-care deficits   - Assess range of motion  - Assess patient's mobility; develop plan if impaired  - Assess patient's need for assistive devices and provide as appropriate  - Encourage maximum independence but intervene and supervise when necessary  - Involve family in performance of ADLs  - Assess for home care needs following discharge   - Consider OT consult to assist with ADL evaluation and planning for discharge  - Provide patient education as appropriate  Outcome: Progressing  Goal: Maintains/Returns to pre admission functional level  Description: INTERVENTIONS:  - Perform BMAT or MOVE assessment daily    - Set and communicate daily mobility goal to care team and patient/family/caregiver  - Collaborate with rehabilitation services on mobility goals if consulted  - Perform Range of Motion  times a day  - Reposition patient every  hours  - Dangle patient  times a day  - Stand patient  times a day  - Ambulate patient  times a day  - Out of bed to chair  times a day   - Out of bed for meals  times a day  - Out of bed for toileting  - Record patient progress and toleration of activity level   Outcome: Progressing     Problem: Nutrition/Hydration-ADULT  Goal: Nutrient/Hydration intake appropriate for improving, restoring or maintaining nutritional needs  Description: Monitor and assess patient's nutrition/hydration status for malnutrition  Collaborate with interdisciplinary team and initiate plan and interventions as ordered  Monitor patient's weight and dietary intake as ordered or per policy   Utilize nutrition screening tool and intervene as necessary  Determine patient's food preferences and provide high-protein, high-caloric foods as appropriate       INTERVENTIONS:  - Monitor oral intake, urinary output, labs, and treatment plans  - Assess nutrition and hydration status and recommend course of action  - Evaluate amount of meals eaten  - Assist patient with eating if necessary   - Allow adequate time for meals  - Recommend/ encourage appropriate diets, oral nutritional supplements, and vitamin/mineral supplements  - Order, calculate, and assess calorie counts as needed  - Recommend, monitor, and adjust tube feedings and TPN/PPN based on assessed needs  - Assess need for intravenous fluids  - Provide specific nutrition/hydration education as appropriate  - Include patient/family/caregiver in decisions related to nutrition  Outcome: Progressing     Problem: Potential for Falls  Goal: Patient will remain free of falls  Description: INTERVENTIONS:  - Educate patient/family on patient safety including physical limitations  - Instruct patient to call for assistance with activity   - Consult OT/PT to assist with strengthening/mobility   - Keep Call bell within reach  - Keep bed low and locked with side rails adjusted as appropriate  - Keep care items and personal belongings within reach  - Initiate and maintain comfort rounds  - Make Fall Risk Sign visible to staff  - Offer Toileting every  Hours, in advance of need  - Initiate/Maintain alarm  - Obtain necessary fall risk management equipment:  - Apply yellow socks and bracelet for high fall risk patients  - Consider moving patient to room near nurses station  Outcome: Progressing

## 2022-06-24 NOTE — PROGRESS NOTES
[facility]  coding opportunities     I13 [id number]     Chart Reviewed number of suggestions sent to Provider: 1     Patients Insurance     Medicare Insurance: [employer]

## 2022-06-24 NOTE — ASSESSMENT & PLAN NOTE
· Patient was admitted after syncopal episode with associated hypotension  · Patient is noted be volume depleted, in acute kidney injury  · Home torsemide on hold  · Gentle IV fluids under the guidance of the cardiology and nephrology team  · Patient with possible ischemia with due to new wall motion abnormalities on echocardiogram, and elevated troponin of 8000  · No additional symptoms since admission  · Negative orthostatics after fluid

## 2022-06-24 NOTE — PHYSICAL THERAPY NOTE
PHYSICAL THERAPY EVALUATION  NAME:  Ines Pro  DATE: 06/24/22    AGE:   63 y o   Mrn:   [id number]  ADMIT DX:  Bradycardia [R00 1]  Syncope [R55]  Hypotension [I95 9]  Neurogenic bladder [N31 9]  NSTEMI (non-ST elevated myocardial infarction) (HCC) [I21 4]  Chronic anemia [D64 9]  ALFRED (acute kidney injury) ([facility] ) [N17 9]  Elevated troponin I level [R77 8]  Acute renal failure superimposed on chronic kidney disease ([facility] ) [N17 9, N18 9]    Past Medical History:   Diagnosis Date    Abnormal liver function     Anemia     Anxiety     Arthritis     Chronic kidney disease     stage 3    [facility] narcotic dependence ([facility])     Chronic pain disorder     lower back, hands , neck and knees    Coronary artery disease     Depression     Diabetes mellitus ([facility] )     Elevated troponin 2/11/2022    GERD (gastroesophageal reflux disease)     no meds at present    Heart murmur     murmur    Hyperlipidemia     Hypertension     Neurogenic bladder     Open toe wound 12/2020    right big toe open calus but is dry at present    Renal disorder     Shortness of breath     exertion    Sleep apnea     doesn't use cpap    Suprapubic catheter ([facility] )      Length Of Stay: 2  Performed at least 2 patient identifiers during session: Name and Birthday  PHYSICAL THERAPY EVALUATION :        06/24/22 0950   Note Type   Note type Evaluation   Pain Assessment   Pain Assessment Tool 0-10   Pain Score 5   Pain Location/Orientation Location: Head   Restrictions/Precautions   Other Precautions Fall Risk;Multiple lines   Home Living   Type of Home House  (4-6 DOROTHEA [facility])   Home Layout Two level;1/2 bath on main level;Performs ADLs on one level; Able to live on main level with bedroom/bathroom;Stairs to enter with rails  (second floor to shower)   Bathroom Shower/Tub Walk-in shower   Bathroom Toilet Standard   Bathroom Equipment Shower chair   Home Equipment Cane  (rollator)   Additional Comments Pt lives in [address] [place] [address] with 4-6 DOROTHEA with first floor bed and half bath, pt reports going upstairs to shower  Prior Function   Level of [place] Independent with ADLs and functional mobility   Lives With Daughter  (and dtr's SO)   Receives Help From Family   ADL Assistance Independent  (assist PRN)   IADLs Needs assistance   Falls in the last 6 months 1 to 4   Comments Pt reports her dtr is her full-time caregiver however dtr works out of the home overnight shifts  She completes her ADLs at I with assistance PRN and has assistance for IADLs and transportation   Cognition   Overall Cognitive Status WFL   Orientation Level Oriented X4   Following Commands Follows all commands and directions without difficulty   RLE Assessment   RLE Assessment WFL  (4/5 strength)   LLE Assessment   LLE Assessment WFL  (4/5 strength)   Light Touch   RLE Light Touch Impaired   RLE Light Touch Comments at/distal to knee   LLE Light Touch Impaired   LLE Light Touch Comments at/distal to knee   Bed Mobility   Additional Comments Pt seated OOB in recliner at start and end of session; bed mobility not assessed this session   Transfers   Sit to Stand 5  Supervision   Additional items Increased time required;Verbal cues   Stand to Sit 5  Supervision   Additional items Increased time required;Verbal cues   Additional Comments With RW, VC for hand placement   Ambulation/Elevation   Gait pattern Improper Weight shift; Wide SHELTON; Decreased foot clearance; Short stride; Excessively slow   Gait Assistance 5  Supervision   Additional items Verbal cues   Assistive Device Rolling walker   Distance 40'   Ambulation/Elevation Additional Comments Pt reporting light headedness/nausea during gait trial with BP assessed at 113/55, HR 62, resolving with 2-3 min seated rest break, RN aware   Balance   Static Sitting Good   Dynamic Sitting Fair +   Static Standing Fair   Dynamic Standing Fair -   Ambulatory Fair -   Endurance Deficit   Endurance Deficit Yes   Endurance Deficit Description fatigue Activity Tolerance   Activity Tolerance Patient limited by fatigue   Nurse Made Aware Damaris BROWN   Assessment   Prognosis Good   Problem List Decreased strength;Decreased endurance; Impaired balance;Decreased mobility;Obesity   Barriers to Discharge Inaccessible home environment   Barriers to Discharge Comments 4-6 DOROTHEA   Goals   Patient Goals Get stronger   STG Expiration Date 07/08/22   Plan   Treatment/Interventions Functional transfer training;LE strengthening/ROM; Elevations; Therapeutic exercise; Endurance training;Patient/family training;Equipment eval/education; Bed mobility;Gait training; Compensatory technique education;Spoke to nursing   PT Frequency 3-5x/wk   Recommendation   PT Discharge Recommendation Home with home health rehabilitation   Equipment Recommended   (pt owns rollator)   AM-[facility] Basic Mobility Inpatient   Turning in Bed Without Bedrails 4   Lying on Back to Sitting on Edge of Flat Bed 3   Moving Bed to Chair 3   Standing Up From Chair 3   Walk in Room 3   Climb 3-5 Stairs 2   Basic Mobility Inpatient Raw Score 18   Basic Mobility Standardized Score 41 05   Highest Level Of Mobility   JH-HLM Goal 6: Walk 10 steps or more   JH-HLM Achieved 7: Walk 25 feet or more   End of Consult   Patient Position at End of Consult Bedside chair; All needs within reach     The patient's AM-[facility] Basic Mobility Inpatient Short Form Raw Score is 18    A Raw score of greater than 16 suggests the patient may benefit from discharge to home  Please also refer to the recommendation of the Physical Therapist for safe discharge planning  (Please find full objective findings from PT assessment regarding body systems outlined above)  Assessment: Pt is a 63 y o  female seen for PT evaluation s/p admission to [address] [place] [address] on 6/21/2022 with Syncope  Order placed for PT services    Upon evaluation: Pt is presenting with impaired functional mobility due to pain, decreased strength, decreased endurance, impaired balance, gait deviations, and fall risk requiring  supervision assistance for transfers and ambulation with RW   Pt's clinical presentation is currently unstable/unpredictable given the functional mobility deficits above coupled with fall risks as indicated by AM-PAC 6-Clicks: 06/15 as well as hx of falls, impaired balance, polypharmacy, and limited sensation/neuropathy and combined with medical complications of hypertension , abnormal renal lab values, abnormal H&H, abnormal WBCs, abnormal sodium values and need for input for mobility technique/safety  Pt's PMHx and comorbidities that may affect physical performance and progress include: CHF, CKD, DM, HTN, CAD and obesity  Personal factors affecting pt at time of IE include: anxiety, depression, inaccessible home environment, step(s) to enter environment, multi-level environment, inability to perform IADLs, inability to navigate level surfaces without external assistance, inability to navigate community distances and recent fall(s)/fall history  Pt will benefit from continued skilled PT services to address deficits as defined above and to maximize level of functional mobility to facilitate return toward PLOF and improved QOL  From PT/mobility standpoint, recommendation at time of d/c would be Home PT pending progress in order to reduce fall risk and maximize pt's functional independence and consistency with mobility in order to facilitate return to PLOF  Recommend trial with walker next 1-2 sessions and ther ex next 1-2 sessions  Goals: Pt will: Perform bed mobility tasks with modified I to reposition in bed and prepare for transfers  Pt will perform transfers with modified I to decrease burden of care and prepare for ambulation  Pt will ambulate with RW for >/= 100' with  Supervision  to decrease burden of care and to access home environment   Pt will complete >/= 12 steps with with unilateral handrail with Supervision to increase Indep in home environment and increase indep while others at work  Increase bilateral LE strength 1/2 grade to facilitate independent mobility and Increase all balance 1/2 grade to decrease risk for falls          Mireille Parrish, PT,DPT

## 2022-06-24 NOTE — ASSESSMENT & PLAN NOTE
· Patient notes pain over both maxillary sinuses, sensation similar to previous sinus infection  · No focal neurologic signs symptoms or deficits  · CT scan the brain negative for sinusitis

## 2022-06-24 NOTE — PROGRESS NOTES
Progress Note - Cardiology   Kimberly Saldaña 63 y o  female MRN: [id number]  Unit/Bed#: E4 -01 Encounter: [id number]          Assessment / Plan  Nausea, diarrhea, anorexia - probable gastroenteritis POA  CKD, ALFRED POA             -Baseline creatinine looksto be around 2 5-2 9             -Presenting creatinine 4 2 with most recent prior value of 2 14 (6/19/2022)  Near-syncope:  History suggests brief orthostatic hypotension (see consult note)  Chest discomfort   -Features atypical for angina and possibly noncardiac though with her diabetes abnormal biomarkers, and new wall motion abnormalities coronary insufficiency is plausible  Elevated troponin - probable non-STEMI with delayed presentation   -HS troponin 9361,  8542,  7903   HFmEF:   compensated at time of hospital presentation   -Echo 6/22/2022:  EF 30% - akinetic:  Basal inferoseptal, mid inferoseptal, mid inferior and apical inferior    Hypokinetic:  Basal anteroseptal, basal inferior, mid anteroseptal, mid inferolateral, apical septal & lateral, and true apex             -Echo 8/24/2021 LVEF 45%   CAD             -Hx PCI  W/ stents x5 LAD & circumflex vessels             -[facility] 8/21:  pRCA chronic total occlusion, patent stents in the LAD and circumflex  Hx HTN:  Low normal to hypotensive trend on admission              -O/p Rx:  Norvasc 5 mg daily, bisoprolol 5 mg daily, torsemide 40 mg daily, isosorbide mononitrate 60 mg daily  Dyslipidemia   -FLP 8/3/2021:  Cholesterol 152, triglycerides 180, HDL 30, LDL 86   -Lipitor 40 mg  Chronic anemia             -Baseline hemoglobin low-mid 8s  Other    * Diabetes    * Polycystic ovarian syndrome    * Fibromyalgia    * History neurogenic bladder, suprapubic catheter    * Obstructive sleep apnea:  Pt  reports positive test in [place] - she does not use CPAP     - Sodium 128, potassium 4 1, BUN 86, creatinine 4 18   - WBCs 12 7, hemoglobin 8 1, platelets 665N   - standing weight 130 kg/287 lb    · Echo from this hospitalization with regression in LVEF with new regional wall motion abnormalities concerning for ischemia/infarct  · Cardiac catheterization ultimately recommended though until renal function has sufficiently stabilized this will be deferred - potentially to the outpatient setting   · At this point will discontinue IV heparin ~~> will confer with attending regarding consideration of Xarelto 2 5 mg b i d  (+aspirin) versus continue dual antiplatelet therapy as taken prior to admission  · Recheck lipid profile to assess efficacy of pre-hospital therapy  · Pre-hospital Beta-blocker ongoing ~~> dose escalation limited by  low normal heart rate trend (upper 50s)  · Currently breathing comfortably without supplemental oxygen, with clear lungs and good urinary output with improvement in creatinine since yesterday ~~> continue to hold diuretic closely following exam/symptoms/renal recovery deferring to Nephrology for management  · With some reports of subjective orthostasis - including pre-hospital near-syncope likely related to same, will repeat orthostatic blood pressures to help guide additional therapy - see next comment  · If not opposed by Nephrology colleagues (or demonstration of orthostatic hypotension) would advise consideration to add low-dose of hydralazine to current regimen for betterment of BP and afterload reduction      Subjective: Bothered by headache  Some inconsistent subjective orthostasis in the last 24 hours  No further chest pain and breathing comfortably without supplemental oxygen    Limited ambulation around her room without difficulty      Vitals:  Vitals:    06/23/22 0600 06/24/22 0551   Weight: 129 kg (285 lb 7 9 oz) 130 kg (287 lb 7 7 oz)   ,  Vitals:    06/23/22 2336 06/24/22 0326 06/24/22 0551 06/24/22 0720   BP: 170/76 155/67  (!) 177/78   BP Location: Left arm Left arm  Left arm   Pulse: 58 59  59   Resp: 18 18  18   Temp: (!) 97 °F (36 1 °C) 98 3 °F (36 8 °C)  (!) 96 9 °F (36 1 °C)   TempSrc: Temporal Temporal  Temporal   SpO2: 96% 94%  99%   Weight:   130 kg (287 lb 7 7 oz)    Height:           Exam:  General:  Alert normally conversant and comfortable-appearing  Heart:  Regular with controlled rate and no pathologic murmur or rub  Lungs:  Clear throughout  Lower Limbs:  No edema           Telemetry:       Sinus rhythm with ventricular rate around 50 beats per minute    Medications:    Current Facility-Administered Medications:     acetaminophen (TYLENOL) tablet 650 mg, 650 mg, Oral, Q6H PRN, Virgene Landau, MD, 650 mg at 06/24/22 0802    allopurinol (ZYLOPRIM) tablet 300 mg, 300 mg, Oral, Daily, Debi House PA-C, 300 mg at 06/24/22 0802    aspirin chewable tablet 81 mg, 81 mg, Oral, Daily, Sharron Odonnell DO, 81 mg at 06/24/22 0802    atorvastatin (LIPITOR) tablet 40 mg, 40 mg, Oral, Daily With LANRE Hoffman-C, 40 mg at 06/23/22 1725    citalopram (CeleXA) tablet 40 mg, 40 mg, Oral, Daily, LANRE Pardo-PONCHO, 40 mg at 06/24/22 0802    clopidogrel (PLAVIX) tablet 75 mg, 75 mg, Oral, Daily, LANRE Pardo-PONCHO, 75 mg at 06/24/22 0802    heparin (porcine) 25,000 units in 0 45% NaCl 250 mL infusion (premix), 3-20 Units/kg/hr (Order-Specific), Intravenous, Titrated, Debi House PA-C, Last Rate: 16 3 mL/hr at 06/24/22 0646, 18 1 Units/kg/hr at 06/24/22 0646    heparin (porcine) injection 2,000 Units, 2,000 Units, Intravenous, Q1H PRN, Sancho Jeronimo PA-C, 2,000 Units at 06/23/22 1410    heparin (porcine) injection 4,000 Units, 4,000 Units, Intravenous, Q1H PRN, Sancho Jeronimo PA-C, 4,000 Units at 06/23/22 2202    insulin glargine (LANTUS) subcutaneous injection 25 Units 0 25 mL, 25 Units, Subcutaneous, Q12H [address], Debi House PA-C, 25 Units at 06/24/22 0808    insulin lispro (HumaLOG) 100 units/mL subcutaneous injection 1-6 Units, 1-6 Units, Subcutaneous, 4x Daily (AC & HS), 2 Units at 06/24/22 0801 **AND** Fingerstick Glucose (POCT), , , 4x Daily AC and at bedtime, Debi House PA-C    isosorbide mononitrate (IMDUR) 24 hr tablet 60 mg, 60 mg, Oral, Daily, Debi House PA-C, 60 mg at 06/24/22 0803    levothyroxine tablet 50 mcg, 50 mcg, Oral, Early Morning, Debi House PA-C, 50 mcg at 06/24/22 0657    OLANZapine (ZyPREXA) tablet 5 mg, 5 mg, Oral, HS, Debi House PA-C, 5 mg at 06/23/22 2155    ondansetron (ZOFRAN) injection 4 mg, 4 mg, Intravenous, Q6H PRN, Debi House PA-C, 4 mg at 06/22/22 1755    oxyCODONE (ROXICODONE) IR tablet 5 mg, 5 mg, Oral, Q4H PRN, Serenity Arroyo MD, 5 mg at 06/24/22 0802      Labs/Data:        Results from last 7 days   Lab Units 06/24/22  0421 06/23/22  0500 06/22/22  0637   WBC Thousand/uL 12 79* 11 81* 12 97*   HEMOGLOBIN g/dL 8 1* 8 1* 8 1*   HEMATOCRIT % 24 6* 24 9* 24 8*   PLATELETS Thousands/uL 274 255 265     Results from last 7 days   Lab Units 06/24/22  0421 06/23/22  0500 06/22/22  0637 06/21/22  2249   POTASSIUM mmol/L 4 1 4 2 4 2 4 2   CHLORIDE mmol/L 97* 95* 97* 96*   CO2 mmol/L 22 21 22 24   BUN mg/dL 86* 88* 81* 78*   CK TOTAL U/L  --   --   --  378*   CK MB INDEX %  --   --   --  4 3*

## 2022-06-24 NOTE — ASSESSMENT & PLAN NOTE
· Hypovolemic hyponatremia, likely secondary to recent GI losses  · Status post isotonic IV fluid  · Hold torsemide  · Appreciate nephrology evaluation and recommendations

## 2022-06-25 LAB
ANION GAP SERPL CALCULATED.3IONS-SCNC: 10 MMOL/L (ref 4–13)
ARTERIAL PATENCY WRIST A: YES
BASE EXCESS BLDA CALC-SCNC: -5.5 MMOL/L
BASOPHILS # BLD AUTO: 0.04 THOUSANDS/ΜL (ref 0–0.1)
BASOPHILS NFR BLD AUTO: 0 % (ref 0–1)
BODY TEMPERATURE: 97 DEGREES FEHRENHEIT
BUN SERPL-MCNC: 87 MG/DL (ref 5–25)
CALCIUM SERPL-MCNC: 8.1 MG/DL (ref 8.3–10.1)
CHLORIDE SERPL-SCNC: 100 MMOL/L (ref 100–108)
CO2 SERPL-SCNC: 24 MMOL/L (ref 21–32)
CREAT SERPL-MCNC: 3.68 MG/DL (ref 0.6–1.3)
EOSINOPHIL # BLD AUTO: 0.34 THOUSAND/ΜL (ref 0–0.61)
EOSINOPHIL NFR BLD AUTO: 3 % (ref 0–6)
ERYTHROCYTE [DISTWIDTH] IN BLOOD BY AUTOMATED COUNT: 15.6 % (ref 11.6–15.1)
GFR SERPL CREATININE-BSD FRML MDRD: 12 ML/MIN/1.73SQ M
GLUCOSE SERPL-MCNC: 239 MG/DL (ref 65–140)
GLUCOSE SERPL-MCNC: 244 MG/DL (ref 65–140)
GLUCOSE SERPL-MCNC: 245 MG/DL (ref 65–140)
GLUCOSE SERPL-MCNC: 255 MG/DL (ref 65–140)
GLUCOSE SERPL-MCNC: 280 MG/DL (ref 65–140)
HCO3 BLDA-SCNC: 20.1 MMOL/L (ref 22–28)
HCT VFR BLD AUTO: 26 % (ref 34.8–46.1)
HGB BLD-MCNC: 8.5 G/DL (ref 11.5–15.4)
IMM GRANULOCYTES # BLD AUTO: 0.1 THOUSAND/UL (ref 0–0.2)
IMM GRANULOCYTES NFR BLD AUTO: 1 % (ref 0–2)
LYMPHOCYTES # BLD AUTO: 1.4 THOUSANDS/ΜL (ref 0.6–4.47)
LYMPHOCYTES NFR BLD AUTO: 13 % (ref 14–44)
MCH RBC QN AUTO: 32.9 PG (ref 26.8–34.3)
MCHC RBC AUTO-ENTMCNC: 32.7 G/DL (ref 31.4–37.4)
MCV RBC AUTO: 101 FL (ref 82–98)
MONOCYTES # BLD AUTO: 0.81 THOUSAND/ΜL (ref 0.17–1.22)
MONOCYTES NFR BLD AUTO: 8 % (ref 4–12)
NEUTROPHILS # BLD AUTO: 8.08 THOUSANDS/ΜL (ref 1.85–7.62)
NEUTS SEG NFR BLD AUTO: 75 % (ref 43–75)
NRBC BLD AUTO-RTO: 0 /100 WBCS
O2 CT BLDA-SCNC: 10.6 ML/DL (ref 16–23)
OXYHGB MFR BLDA: 91.9 % (ref 94–97)
PCO2 BLDA: 39.9 MM HG (ref 36–44)
PCO2 TEMP ADJ BLDA: 38.4 MM HG (ref 36–44)
PH BLD: 7.33 [PH] (ref 7.35–7.45)
PH BLDA: 7.32 [PH] (ref 7.35–7.45)
PLATELET # BLD AUTO: 298 THOUSANDS/UL (ref 149–390)
PMV BLD AUTO: 9.5 FL (ref 8.9–12.7)
PO2 BLD: 68.2 MM HG (ref 75–129)
PO2 BLDA: 72.5 MM HG (ref 75–129)
POTASSIUM SERPL-SCNC: 4.5 MMOL/L (ref 3.5–5.3)
RBC # BLD AUTO: 2.58 MILLION/UL (ref 3.81–5.12)
SODIUM SERPL-SCNC: 134 MMOL/L (ref 136–145)
SPECIMEN SOURCE: ABNORMAL
WBC # BLD AUTO: 10.77 THOUSAND/UL (ref 4.31–10.16)

## 2022-06-25 PROCEDURE — 80048 BASIC METABOLIC PNL TOTAL CA: CPT | Performed by: PHYSICIAN ASSISTANT

## 2022-06-25 PROCEDURE — 36600 WITHDRAWAL OF ARTERIAL BLOOD: CPT

## 2022-06-25 PROCEDURE — 99232 SBSQ HOSP IP/OBS MODERATE 35: CPT | Performed by: INTERNAL MEDICINE

## 2022-06-25 PROCEDURE — 97166 OT EVAL MOD COMPLEX 45 MIN: CPT

## 2022-06-25 PROCEDURE — 82948 REAGENT STRIP/BLOOD GLUCOSE: CPT

## 2022-06-25 PROCEDURE — 85025 COMPLETE CBC W/AUTO DIFF WBC: CPT | Performed by: INTERNAL MEDICINE

## 2022-06-25 PROCEDURE — 82805 BLOOD GASES W/O2 SATURATION: CPT | Performed by: INTERNAL MEDICINE

## 2022-06-25 RX ORDER — AMOXICILLIN 250 MG
1 CAPSULE ORAL 2 TIMES DAILY
Status: DISCONTINUED | OUTPATIENT
Start: 2022-06-25 | End: 2022-07-02 | Stop reason: HOSPADM

## 2022-06-25 RX ORDER — SORBITOL SOLUTION 70 %
30 SOLUTION, ORAL MISCELLANEOUS DAILY
Status: DISCONTINUED | OUTPATIENT
Start: 2022-06-25 | End: 2022-07-02 | Stop reason: HOSPADM

## 2022-06-25 RX ORDER — INSULIN GLARGINE 100 [IU]/ML
30 INJECTION, SOLUTION SUBCUTANEOUS EVERY 12 HOURS SCHEDULED
Status: DISCONTINUED | OUTPATIENT
Start: 2022-06-25 | End: 2022-07-02 | Stop reason: HOSPADM

## 2022-06-25 RX ORDER — NALOXONE HYDROCHLORIDE 1 MG/ML
2 INJECTION INTRAMUSCULAR; INTRAVENOUS; SUBCUTANEOUS ONCE
Status: DISCONTINUED | OUTPATIENT
Start: 2022-06-25 | End: 2022-07-02 | Stop reason: HOSPADM

## 2022-06-25 RX ORDER — MAGNESIUM HYDROXIDE/ALUMINUM HYDROXICE/SIMETHICONE 120; 1200; 1200 MG/30ML; MG/30ML; MG/30ML
30 SUSPENSION ORAL EVERY 4 HOURS PRN
Status: DISCONTINUED | OUTPATIENT
Start: 2022-06-25 | End: 2022-07-02 | Stop reason: HOSPADM

## 2022-06-25 RX ADMIN — INSULIN LISPRO 3 UNITS: 100 INJECTION, SOLUTION INTRAVENOUS; SUBCUTANEOUS at 07:54

## 2022-06-25 RX ADMIN — DIPHENHYDRAMINE HYDROCHLORIDE 10 ML: 12.5 LIQUID ORAL at 21:29

## 2022-06-25 RX ADMIN — INSULIN LISPRO 3 UNITS: 100 INJECTION, SOLUTION INTRAVENOUS; SUBCUTANEOUS at 12:05

## 2022-06-25 RX ADMIN — INSULIN LISPRO 4 UNITS: 100 INJECTION, SOLUTION INTRAVENOUS; SUBCUTANEOUS at 21:30

## 2022-06-25 RX ADMIN — ACETAMINOPHEN 325MG 650 MG: 325 TABLET ORAL at 17:40

## 2022-06-25 RX ADMIN — ISOSORBIDE MONONITRATE 60 MG: 60 TABLET, EXTENDED RELEASE ORAL at 08:06

## 2022-06-25 RX ADMIN — INSULIN GLARGINE 30 UNITS: 100 INJECTION, SOLUTION SUBCUTANEOUS at 21:29

## 2022-06-25 RX ADMIN — OXYCODONE HYDROCHLORIDE 5 MG: 5 TABLET ORAL at 07:55

## 2022-06-25 RX ADMIN — OLANZAPINE 5 MG: 5 TABLET, FILM COATED ORAL at 21:28

## 2022-06-25 RX ADMIN — CITALOPRAM HYDROBROMIDE 40 MG: 20 TABLET ORAL at 08:07

## 2022-06-25 RX ADMIN — ASPIRIN 81 MG CHEWABLE TABLET 81 MG: 81 TABLET CHEWABLE at 08:07

## 2022-06-25 RX ADMIN — LEVOTHYROXINE SODIUM 50 MCG: 50 TABLET ORAL at 05:16

## 2022-06-25 RX ADMIN — HEPARIN SODIUM 5000 UNITS: 5000 INJECTION INTRAVENOUS; SUBCUTANEOUS at 21:30

## 2022-06-25 RX ADMIN — INSULIN GLARGINE 28 UNITS: 100 INJECTION, SOLUTION SUBCUTANEOUS at 08:09

## 2022-06-25 RX ADMIN — ALLOPURINOL 300 MG: 300 TABLET ORAL at 08:07

## 2022-06-25 RX ADMIN — CLOPIDOGREL BISULFATE 75 MG: 75 TABLET ORAL at 08:06

## 2022-06-25 RX ADMIN — SORBITOL SOLUTION (BULK) 30 ML: 70 SOLUTION at 13:19

## 2022-06-25 RX ADMIN — LIDOCAINE HYDROCHLORIDE 10 ML: 20 SOLUTION ORAL; TOPICAL at 09:30

## 2022-06-25 RX ADMIN — DOCUSATE SODIUM 50MG AND SENNOSIDES 8.6MG 1 TABLET: 8.6; 5 TABLET, FILM COATED ORAL at 17:37

## 2022-06-25 RX ADMIN — HEPARIN SODIUM 5000 UNITS: 5000 INJECTION INTRAVENOUS; SUBCUTANEOUS at 13:19

## 2022-06-25 RX ADMIN — ATORVASTATIN CALCIUM 40 MG: 40 TABLET, FILM COATED ORAL at 17:37

## 2022-06-25 RX ADMIN — INSULIN LISPRO 3 UNITS: 100 INJECTION, SOLUTION INTRAVENOUS; SUBCUTANEOUS at 17:37

## 2022-06-25 RX ADMIN — HEPARIN SODIUM 5000 UNITS: 5000 INJECTION INTRAVENOUS; SUBCUTANEOUS at 05:16

## 2022-06-25 RX ADMIN — ACETAMINOPHEN 325MG 650 MG: 325 TABLET ORAL at 07:55

## 2022-06-25 RX ADMIN — DOCUSATE SODIUM 50MG AND SENNOSIDES 8.6MG 1 TABLET: 8.6; 5 TABLET, FILM COATED ORAL at 13:19

## 2022-06-25 NOTE — PLAN OF CARE
Problem: OCCUPATIONAL THERAPY ADULT  Goal: Performs self-care activities at highest level of function for planned discharge setting  See evaluation for individualized goals  Description: Treatment Interventions: ADL retraining, Functional transfer training, UE strengthening/ROM, Endurance training, Cognitive reorientation, Patient/family training, Equipment evaluation/education, Compensatory technique education, Activityengagement, Energy conservation          See flowsheet documentation for full assessment, interventions and recommendations  6/25/2022 1431 by Kiera Chery OT  Note: Limitation: Decreased ADL status, Decreased UE strength, Decreased Safe judgement during ADL, Decreased cognition, Decreased sensation, Decreased endurance, Decreased self-care trans, Decreased high-level ADLs  Prognosis: Good  Assessment: Pt is a 63 y o  female seen for OT evaluation s/p admit to [facility] on 6/21/2022 w/ Syncope  Comorbidities affecting pt's functional performance at time of assessment include: CHF, CAD, DM II, HTN, HLD, neurogenic bladder w/ catheter, ALFRED on CKD IV, gastroenteritis, elevated troponin, obstructive sleep apnea  Personal factors affecting pt at time of IE include:steps to enter environment, limited home support, difficulty performing ADLS, difficulty performing IADLS , flat affect and decreased initiation and engagement , alone at night  Prior to admission, pt was living w/ daughter who is her caregiver and reports independent w/ Dressing and assist w/ showers, independent w/ functional transfers and mobility w/ SPC, assist w/ IADLs, assist transport   Upon evaluation: Pt requires supervision supine<>sit bed mobility, supervision sit>Stand, MIN assist stand>sit w/ VCs for hand placement and cues for walker safety, MIN assist toilet transfers, MIN-MOD assist LB ADLs, setup UB ADLs, MIN assist grooming  2* the following deficits impacting occupational performance: increased weakness, dyspnea on exertion, lethargy, fall risk, impaired functional reach, decreased activity tolerance, flat affect, 7/10 on TEJEDA scale of exertion for functional ambulation and toilet transfer/toileting task, fatigue w/ slight dizziness and BP:159/70  Pt reports eating a low sodium diet and that she needs to start weighing herself again at home  Pt to benefit from continued skilled OT tx while in the hospital to address deficits as defined above and maximize level of functional independence w ADL's and functional mobility  Occupational Performance areas to address include: grooming, bathing/shower, toilet hygiene, dressing, health maintenance, functional mobility, clothing management, cleaning and meal prep, EC education  From OT standpoint, recommendation at time of d/c would be short term rehab  The patient's raw score on the AM-PAC Daily Activity inpatient short form is 18, standardized score is 38 66, less than 39 4  Patients at this level are likely to benefit from discharge to post-acute rehabilitation services  Please refer to the recommendation of the Occupational Therapist for safe discharge planning  OT Discharge Recommendation: Post acute rehabilitation services  OT - OK to Discharge:  (when medically stable)    6/25/2022 1431 by Kajal Matta OT  Note: Limitation: Decreased ADL status, Decreased UE strength, Decreased Safe judgement during ADL, Decreased cognition, Decreased sensation, Decreased endurance, Decreased self-care trans, Decreased high-level ADLs  Prognosis: Good  Assessment: Pt is a 63 y o  female seen for OT evaluation s/p admit to [facility] on 6/21/2022 w/ Syncope  Comorbidities affecting pt's functional performance at time of assessment include: CHF, CAD, DM II, HTN, HLD, neurogenic bladder w/ catheter, ALFRED on CKD IV, gastroenteritis, elevated troponin, obstructive sleep apnea   Personal factors affecting pt at time of IE include:steps to enter environment, limited home support, difficulty performing ADLS, difficulty performing IADLS , flat affect and decreased initiation and engagement , alone at night  Prior to admission, pt was living w/ daughter who is her caregiver and reports independent w/ Dressing and assist w/ showers, independent w/ functional transfers and mobility w/ SPC, assist w/ IADLs, assist transport  Upon evaluation: Pt requires supervision supine<>sit bed mobility, supervision sit>Stand, MIN assist stand>sit w/ VCs for hand placement and cues for walker safety, MIN assist toilet transfers, MIN-MOD assist LB ADLs, setup UB ADLs, MIN assist grooming  2* the following deficits impacting occupational performance: increased weakness, dyspnea on exertion, lethargy, fall risk, impaired functional reach, decreased activity tolerance, flat affect, 7/10 on TEJEDA scale of exertion for functional ambulation and toilet transfer/toileting task, fatigue w/ slight dizziness and BP:159/70  Pt reports eating a low sodium diet and that she needs to start weighing herself again at home  Pt to benefit from continued skilled OT tx while in the hospital to address deficits as defined above and maximize level of functional independence w ADL's and functional mobility  Occupational Performance areas to address include: grooming, bathing/shower, toilet hygiene, dressing, health maintenance, functional mobility, clothing management, cleaning and meal prep, EC education  From OT standpoint, recommendation at time of d/c would be short term rehab  The patient's raw score on the AM-PAC Daily Activity inpatient short form is 18, standardized score is 38 66, less than 39 4  Patients at this level are likely to benefit from discharge to post-acute rehabilitation services  Please refer to the recommendation of the Occupational Therapist for safe discharge planning       OT Discharge Recommendation: Post acute rehabilitation services  OT - OK to Discharge:  (when medically stable)

## 2022-06-25 NOTE — PROGRESS NOTES
Progress Note - Nephrology   Cooper Lion 63 y o  female MRN: [id number]  Unit/Bed#: E4 -01 Encounter: [id number]    65-year-old female with history of CKD IV, nephrotic range proteinuria, hypertension, diabetes, uncontrolled, CAD, chronic combined CHF, chronic suprapubic catheter who presented with syncope chest pain and was found to have acute kidney injury  Nephrology consulted and following for ALFRED on top of Chronic Kidney Disease  ASSESSMENT and PLAN:  Acute kidney injury (POA):  On top of Chronic Kidney Disease IV  Etiology: Suspect prerenal in the setting of GI losses, volume depletion leading to ATN  Assessment:   After review of medical records through [address] [place] [address] it appears that the patient has a baseline Creatinine of 2 5-2 9   Admission creatinine 4 23   Peak creatinine 4 52 on 04/23/2022   Current creatinine 3 68   Diuretics remain on hold   IV fluids off   Clinically, patient is not uremic and there is no acute indication for renal replacement therapy   Plan:   Avoid nephrotoxins, adjust meds to appropriate GFR   Optimize hemodynamic status to avoid delay in renal recovery   Avoid hypotension to prevent decreased renal perfusion   Continue to hold diuretics and IV fluids   Continue monitor volume status in the setting of known cardiomyopathy   Monitor I/O, lab values volume status   Check BMP in a m      Chronic kidney disease IV:    Etiology:  Suspect secondary to hypertensive nephrosclerosis, diabetic kidney disease, cardiorenal syndrome, multiple episodes of ALFRED as well as obesity related hypoperfusion  Assessment and Plan:   After review of medical records through [facility] everywhere it appears that the patient has a baseline Creatinine of 2 5-2 9   Patient follows with Dr Audie Cain last seen 05/25/2022   Recommend follow-up on discharge    Blood pressure/Hypertension:  Assessment and Plan:   Current blood pressure:  Current blood pressure elevated   Current medications include:  Imdur 60 mg daily   Home medications all on hold-torsemide   Maximize hemodynamics to maintain MAP >65   Avoid hypotension or fluctuations in blood pressure   Will continue to trend    H&H/anemia: Likely of CKD  Assessment and Plan:   Current hemoglobin 8 5   On oral iron   Per primary team   Transfuse if hemoglobin less than 7 0    Electrolytes:  Assessment and Plan:  Hyponatremia:  Improving slowly likely in the setting of volume depletion  · Current sodium 134>128>127  · Continue to hold diuretics IV fluids for now  · Continue trend closely    Other medical issues:  Diabetes II:  Per primary team  CAD status post multiple PCI/stents-follows cardiology as outpatient  Elevated troponins-decreased EF of 30%-cardiology following  Dyslipidemia  History of neurogenic bladder-status post suprapubic catheter  EUN-does not use CPAP        Review of systems  Patient seen and examined at bedside:  Overall feels better  Review of Systems   Constitutional: Negative  HENT: Negative  Eyes: Negative  Respiratory: Negative  Cardiovascular: Negative  Gastrointestinal: Negative  Endocrine: Negative  Genitourinary: Negative  Musculoskeletal: Negative  Neurological: Negative  Hematological: Negative  Psychiatric/Behavioral: Negative             Physical exam:  Current Weight: Weight - Scale: 129 kg (285 lb 0 9 oz)  Vitals:    06/25/22 0305 06/25/22 0600 06/25/22 0748 06/25/22 1129   BP: (!) 180/72  162/98 161/72   BP Location: Left arm  Left arm Left arm   Pulse: 65  68 67   Resp: 18  18 18   Temp: (!) 97 °F (36 1 °C)  (!) 97 °F (36 1 °C) (!) 97 °F (36 1 °C)   TempSrc: Temporal  Temporal Temporal   SpO2: 95%  96% 94%   Weight:  129 kg (285 lb 0 9 oz)     Height:           Intake/Output Summary (Last 24 hours) at 6/25/2022 1420  Last data filed at 6/25/2022 0701  Gross per 24 hour   Intake --   Output 2700 ml   Net -2700 ml       Physical Exam  Vitals and nursing note reviewed  Constitutional:       Appearance: Normal appearance  Comments: No acute distress, resting in bed   HENT:      Head: Normocephalic and atraumatic  Nose: Nose normal       Mouth/Throat:      Mouth: Mucous membranes are dry  Pharynx: Oropharynx is clear  Eyes:      Extraocular Movements: Extraocular movements intact  Conjunctiva/sclera: Conjunctivae normal    Cardiovascular:      Rate and Rhythm: Regular rhythm  Pulses: Normal pulses  Heart sounds: Normal heart sounds  Pulmonary:      Breath sounds: Normal breath sounds  Abdominal:      General: Bowel sounds are normal       Palpations: Abdomen is soft  Musculoskeletal:         General: Normal range of motion  Cervical back: Normal range of motion and neck supple  Skin:     General: Skin is warm and dry  Neurological:      General: No focal deficit present  Mental Status: She is alert and oriented to person, place, and time  Psychiatric:         Mood and Affect: Mood normal          Behavior: Behavior normal          Thought Content:  Thought content normal          Judgment: Judgment normal             Medications:    Current Facility-Administered Medications:     acetaminophen (TYLENOL) tablet 650 mg, 650 mg, Oral, Q6H PRN, Juan Musa MD, 650 mg at 06/25/22 0755    allopurinol (ZYLOPRIM) tablet 300 mg, 300 mg, Oral, Daily, Debi House PA-C, 300 mg at 06/25/22 0807    aspirin chewable tablet 81 mg, 81 mg, Oral, Daily, Jeremie iSlva DO, 81 mg at 06/25/22 0807    atorvastatin (LIPITOR) tablet 40 mg, 40 mg, Oral, Daily With Patrica House PA-C, 40 mg at 06/24/22 1540    citalopram (CeleXA) tablet 40 mg, 40 mg, Oral, Daily, Debi House PA-C, 40 mg at 06/25/22 0807    clopidogrel (PLAVIX) tablet 75 mg, 75 mg, Oral, Daily, Debi House PA-C, 75 mg at 06/25/22 0806    heparin (porcine) subcutaneous injection 5,000 Units, 5,000 Units, Subcutaneous, Q8H [facility], Nuar Desai MD, 5,000 Units at 06/25/22 1319    insulin glargine (LANTUS) subcutaneous injection 30 Units 0 3 mL, 30 Units, Subcutaneous, Q12H [facility], Adan Rosen DO    insulin lispro (HumaLOG) 100 units/mL subcutaneous injection 1-6 Units, 1-6 Units, Subcutaneous, 4x Daily (AC & HS), 3 Units at 06/25/22 1205 **AND** Fingerstick Glucose (POCT), , , 4x Daily AC and at bedtime, Debi House PA-C    isosorbide mononitrate (IMDUR) 24 hr tablet 60 mg, 60 mg, Oral, Daily, Debi House PA-C, 60 mg at 06/25/22 0806    levothyroxine tablet 50 mcg, 50 mcg, Oral, Early Morning, Debi House PA-C, 50 mcg at 06/25/22 0516    Lidocaine Viscous HCl (XYLOCAINE) 2 % mucosal solution 10 mL, 10 mL, Swish & Spit, 4x Daily PRN, Nura Desai MD, 10 mL at 06/25/22 0930    naloxone [facility]) injection 2 mg, 2 mg, Intravenous, Once, Adan Rosen DO    OLANZapine (ZyPREXA) tablet 5 mg, 5 mg, Oral, HS, Debi House PA-C, 5 mg at 06/24/22 2115    ondansetron (ZOFRAN) injection 4 mg, 4 mg, Intravenous, Q6H PRN, Debi House PA-C, 4 mg at 06/22/22 1755    senna-docusate sodium (SENOKOT S) 8 6-50 mg per tablet 1 tablet, 1 tablet, Oral, BID, Adan Rosen DO, 1 tablet at 06/25/22 1319    sorbitol 70 % solution 30 mL, 30 mL, Oral, Daily, Adan Rosen DO, 30 mL at 06/25/22 1319    Laboratory Results:  Results from last 7 days   Lab Units 06/25/22  0552 06/24/22  0421 06/23/22  0500 06/22/22  0637 06/21/22  2249 06/19/22  0315   WBC Thousand/uL 10 77* 12 79* 11 81* 12 97* 13 06* 11 04*   HEMOGLOBIN g/dL 8 5* 8 1* 8 1* 8 1* 8 4* 9 7*   HEMATOCRIT % 26 0* 24 6* 24 9* 24 8* 26 3* 30 5*   PLATELETS Thousands/uL 298 274 255 265 276 216   SODIUM mmol/L 134* 128* 127* 130* 129* 139   POTASSIUM mmol/L 4 5 4 1 4 2 4 2 4 2 4 9   CHLORIDE mmol/L 100 97* 95* 97* 96* 109*   CO2 mmol/L 24 22 21 22 24 22   BUN mg/dL 87* 86* 88* 81* 78* 44*   CREATININE mg/dL 3 68* 4 18* 4 52* 4 37* 4 23* 2 14*   CALCIUM mg/dL 8 1* 7 5* 7 5* 7 9* 7 9* 8 7   MAGNESIUM mg/dL  --   --   --   --   --  2 5*

## 2022-06-25 NOTE — OCCUPATIONAL THERAPY NOTE
Occupational Therapy Evaluation     Patient Name: Annmarie Anne  [id number] Date: 6/25/2022  Problem List  Principal Problem:    Syncope  Active Problems:    Type 2 diabetes mellitus with stage 4 chronic kidney disease, with long-term current use of insulin (HCC)    CAD (coronary artery disease)    Chronic combined systolic and diastolic CHF (congestive heart failure) (HCC)    Acute renal failure superimposed on chronic kidney disease (HCC)    Elevated troponin I level    Hypotension    Gastroenteritis    Hyponatremia    History of anemia due to chronic kidney disease    Bradycardia    Headache    Past Medical History  Past Medical History:   Diagnosis Date    Abnormal liver function     Anemia     Anxiety     Arthritis     Chronic kidney disease     stage 3    Chronic narcotic dependence (HCC)     Chronic pain disorder     lower back, hands , neck and knees    Coronary artery disease     Depression     Diabetes mellitus (HCC)     Elevated troponin 2/11/2022    GERD (gastroesophageal reflux disease)     no meds at present    Heart murmur     murmur    Hyperlipidemia     Hypertension     Neurogenic bladder     Open toe wound 12/2020    right big toe open calus but is dry at present    Renal disorder     Shortness of breath     exertion    Sleep apnea     doesn't use cpap    Suprapubic catheter [facility])      Past Surgical History  Past Surgical History:   Procedure Laterality Date    AMPUTATION      ANGIOPLASTY  2017    5    BREAST EXCISIONAL BIOPSY Left     BREAST SURGERY      CARDIAC CATHETERIZATION      CARPAL TUNNEL RELEASE Bilateral     CERVICAL FUSION      HYSTERECTOMY  2008    IR SUPRAPUBIC CATHETER PLACEMENT  6/15/2021    KNEE SURGERY      OOPHORECTOMY  2008    TN EXC SKIN BENIG 3 1-4 CM REMAINDR BODY N/A 12/21/2020    Procedure: EXCISION SEBACEOUS CYST X 5 SCALP;  Surgeon: Tee Mcgill MD;  Location:  MAIN OR;  Service: General    TOE AMPUTATION Left     TRIGGER FINGER RELEASE Right     4th Finger 06/25/22 1348   OT Last Visit   OT Visit Date 06/25/22   Note Type   Note type Evaluation   Restrictions/Precautions   Other Precautions Fall Risk;Telemetry;Multiple lines; Chair Alarm; Bed Alarm   Pain Assessment   Pain Assessment Tool 0-10   Pain Score No Pain   Home Living   Type of Home House   Home Layout Two level;1/2 bath on main level; Able to live on main level with bedroom/bathroom; Performs ADLs on one level  (4-6 DOROTHEA; flight to full bath)   Bathroom Shower/Tub Walk-in shower   Bathroom Toilet Standard   Bathroom Equipment Shower chair   Bathroom Accessibility Accessible   Home Equipment Cane;Walker  (rollator)   Additional Comments pt reports typically uses a SPC in home and occaisional use of rollator; stays on 1st floor except daughter helps her up the stairs several times a week to shower   Prior Function   Level of [place] Independent with ADLs and functional mobility   Lives With Daughter   Receives Help From Family   ADL Assistance Independent  (daughter supervises for shower)   IADLs Needs assistance   Falls in the last 6 months 1 to 4   Vocational On disability   Comments daughter is her fulltime caregiver during the day, however at night daughter works other jobs; pt manages own medications, daughter does cooking/cleaning/laundry   Lifestyle   Autonomy per pt independent w/ Dressing and supervision for showers, independent w/ functional transfers and mobility w/ SPC, assist w/ IADLs   Reciprocal Relationships daughter   Service to Others on disability   Intrinsic Gratification watching tv   Subjective   Subjective "I just don't feel right yet"   ADL   Where Assessed Chair   Eating Assistance 5  Supervision/Setup   Grooming Assistance 4  Minimal Assistance   Grooming Deficit Setup;Steadying;Supervision/safety; Increased time to complete   UB Bathing Assistance 4  Minimal Assistance   LB Bathing Assistance 3  Moderate Assistance   [address] Assistance 4  Minimal Assistance   Toileting Assistance  4  Minimal Assistance   Functional Assistance 4  Minimal Assistance   Bed Mobility   Supine to Sit 5  Supervision   Additional items Increased time required;HOB elevated; Bedrails;Verbal cues   Sit to Supine 5  Supervision   Additional items Increased time required;Verbal cues; Bedrails   Additional Comments increased time to complete   Transfers   Sit to Stand 5  Supervision   Additional items Assist x 1; Increased time required;Verbal cues; Bedrails   Stand to Sit 4  Minimal assistance   Additional items Assist x 1; Increased time required;Verbal cues;Armrests  (cues for walker safety)   Toilet transfer 4  Minimal assistance   Additional items Assist x 1; Increased time required;Verbal cues;Standard toilet  (grab bar use)   Additional Comments cues for safe RW positioning and hand placement   Functional Mobility   Functional Mobility 4  Minimal assistance   Additional Comments assist x1 w/ slower pace and increased time, cues for proper breathing techniques   Additional items Rolling walker   Balance   Static Sitting Fair +   Dynamic Sitting Fair   Static Standing Fair -   Dynamic Standing Poor +   Ambulatory Poor +   Activity Tolerance   Activity Tolerance Patient limited by fatigue;Treatment limited secondary to medical complications (Comment)   Medical Staff Made Aware Dr Sedrick Dozier appropriate to see per Damaris BROWN   RUE Assessment   RUE Assessment WFL  (3+/5,  4-/5)   LUE Assessment   LUE Assessment WFL  (3+/5,  4-/5)   Hand Function   Gross Motor Coordination Functional   Fine Motor Coordination Functional   Sensation   Light Touch Partial deficits in the RUE;Partial deficits in the LUE   Additional Comments reports constant numbness and tingling in b/l hands for a few years   Proprioception   Proprioception No apparent deficits   Vision-Basic Assessment   Current Vision Wears glasses all the time   Vision - Complex Assessment   Ocular Range of Motion [facility] [place]   Acuity Able to read clock/calendar on wall without difficulty   Perception   Inattention/Neglect Appears intact   Cognition   Overall Cognitive Status WFL   Arousal/Participation Responsive; Alert; Cooperative   Attention Within functional limits   Orientation Level Oriented to person;Oriented to place;Oriented to time;Oriented to situation;Oriented X4   Memory Within functional limits   Following Commands Follows one step commands without difficulty   Comments pt w/ flat affect, increased processing time, pleasant and cooperative   Assessment   Limitation Decreased ADL status; Decreased UE strength;Decreased Safe judgement during ADL;Decreased cognition;Decreased sensation;Decreased endurance;Decreased self-care trans;Decreased high-level ADLs   Prognosis Good   Assessment Pt is a 63 y o  female seen for OT evaluation s/p admit to [facility] on 6/21/2022 w/ Syncope  Comorbidities affecting pt's functional performance at time of assessment include: CHF, CAD, DM II, HTN, HLD, neurogenic bladder w/ catheter, ALFRED on CKD IV, gastroenteritis, elevated troponin, obstructive sleep apnea  Personal factors affecting pt at time of IE include:steps to enter environment, limited home support, difficulty performing ADLS, difficulty performing IADLS , flat affect and decreased initiation and engagement , alone at night  Prior to admission, pt was living w/ daughter who is her caregiver and reports independent w/ Dressing and assist w/ showers, independent w/ functional transfers and mobility w/ SPC, assist w/ IADLs, assist transport   Upon evaluation: Pt requires supervision supine<>sit bed mobility, supervision sit>Stand, MIN assist stand>sit w/ VCs for hand placement and cues for walker safety, MIN assist toilet transfers, MIN-MOD assist LB ADLs, setup UB ADLs, MIN assist grooming  2* the following deficits impacting occupational performance: increased weakness, dyspnea on exertion, lethargy, fall risk, impaired functional reach, decreased activity tolerance, flat affect, 7/10 on TEJEDA scale of exertion for functional ambulation and toilet transfer/toileting task, fatigue w/ slight dizziness and BP:159/70  Pt reports eating a low sodium diet and that she needs to start weighing herself again at home  Pt to benefit from continued skilled OT tx while in the hospital to address deficits as defined above and maximize level of functional independence w ADL's and functional mobility  Occupational Performance areas to address include: grooming, bathing/shower, toilet hygiene, dressing, health maintenance, functional mobility, clothing management, cleaning and meal prep, EC education  From OT standpoint, recommendation at time of d/c would be short term rehab  The patient's raw score on the AM-PAC Daily Activity inpatient short form is 18, standardized score is 38 66, less than 39 4  Patients at this level are likely to benefit from discharge to post-acute rehabilitation services  Please refer to the recommendation of the Occupational Therapist for safe discharge planning  Goals   Patient Goals "get better and feel myself again"   LTG Time Frame 10-14   Long Term Goal please see below goals   Plan   Treatment Interventions ADL retraining;Functional transfer training;UE strengthening/ROM; Endurance training;Cognitive reorientation;Patient/family training;Equipment evaluation/education; Compensatory technique education; Activityengagement; Energy conservation   Goal Expiration Date 07/09/22   OT Frequency 3-5x/wk   Recommendation   OT Discharge Recommendation Post acute rehabilitation services   OT - OK to Discharge   (when medically stable)   AM-PAC Daily Activity Inpatient   Lower Body Dressing 3   Bathing 2   Toileting 3   Upper Body Dressing 3   Grooming 3   Eating 4   Daily Activity Raw Score 18   Daily Activity Standardized Score (Calc for Raw Score >=11) 38 66   AM-PAC Applied Cognition Inpatient   Following a Speech/Presentation 4   Understanding Ordinary Conversation 4   Taking Medications 4   Remembering Where Things Are Placed or Put Away 4   Remembering List of 4-5 Errands 3   Taking Care of Complicated Tasks 4   Applied Cognition Raw Score 23   Applied Cognition Standardized Score 53 08   Modified Martin Scale   Modified Gilbert Scale 4     Occupational Therapy Goals to be met in 10-14 days:  1) Pt will improve activity tolerance to G for 30 min txment sessions to enhance ADLs  2) Pt will complete ADLs/self care w/ mod I w/ LHAE prn  3) Pt will complete toileting w/ mod I w/ G hygiene/thoroughness using DME PRN  4) Pt will improve functional transfers on/off all surfaces using DME PRN w/ G balance/safety including toileting w/ mod I  5) Pt will improve fx'l mobility during I/ADl/leisure tasks using DME PRN w/ g balance/safety w/ mod I  6) Pt will engage in ongoing cognitive assessment w/ G participation to A w/ safe d/c planning/recommendations  7) Pt will demonstrate G carryover of pt/caregiver education and training as appropriate w/ mod I  w/ G tolerance  8) Pt will engage in depression screen/leisure interest checklist w/ G participation to monitor s/s depression and ID 3 positive coping strategies to A w/ emotional regulation and management  9) Pt will demonstrate 100% carryover of E C  techniques w/ mod I t/o fx'l I/ADL/leisure tasks w/o cues s/p skilled education  10) Pt will engage in activity configuration activity w/ G participation and mod I to increase time management skills and improve participation in a structured routine to improve overall quality of life  11) Pt will demonstrate 100% carryover of LHAE for LB ADLs/self care and leisure s/p skilled education w/ mod I and G participation  12) Pt will demonstrate improved standing tolerance to 3-5 minutes during functional tasks w/ Fair + dynamic standing balance to enhance ADL performance  13) Pt will demonstrate improved b/l UE strength by 1 MMT grade to enhance ADLS and functional transfers  14) Pt will demonstrate and recall self-monitoring techniques for CHF (such as daily weights on scale, reading scale, modified diets) at MOD I level s/p education and training to enhance health maintenance routines at home         Documentation completed by: Ludwin Allison MS, OTR/L

## 2022-06-25 NOTE — PROGRESS NOTES
Progress Note - Hazel Bajwa 63 y o  female MRN: [id number]    Unit/Bed#: E4 -01 Encounter: [id number]    Assessment/Plan:    A KI/CKD 4   baseline creatinine 2 5 to 2 9, current creatinine 3 68, monitor with Nephrology and Cardiology    Chronic combined HF  latest EF 30 percent, holding diuretic with increased creatinine and orthostasis, planned repeat echocardiogram Monday per Cardiology, if creatinine improves considering cardiac catheterization    Diabetes   glucose increase Lantus to 30 units and add NovoLog, continue ADA and sliding scale    Dyslipidemia   continue statin for LDL control    Hypothyroid   continue Synthroid 50 micrograms daily    Subjective:   Still lightheaded with standing and weak no chest pain or shortness of breath resting today no nausea vomiting no fevers chills appetite good      Objective:     Vitals: Blood pressure 162/98, pulse 68, temperature (!) 97 °F (36 1 °C), temperature source Temporal, resp  rate 18, height 5' 8" (1 727 m), weight 129 kg (285 lb 0 9 oz), SpO2 96 %, not currently breastfeeding  ,Body mass index is 43 34 kg/m²  Results from last 7 days   Lab Units 06/25/22  0552 06/22/22  0637 06/22/22  0027   WBC Thousand/uL 10 77*   < >  --    HEMOGLOBIN g/dL 8 5*   < >  --    HEMATOCRIT % 26 0*   < >  --    PLATELETS Thousands/uL 298   < >  --    INR   --   --  1 21*    < > = values in this interval not displayed  Results from last 7 days   Lab Units 06/25/22  0552 06/23/22  0500 06/22/22  0637   POTASSIUM mmol/L 4 5   < > 4 2   CHLORIDE mmol/L 100   < > 97*   CO2 mmol/L 24   < > 22   BUN mg/dL 87*   < > 81*   CREATININE mg/dL 3 68*   < > 4 37*   CALCIUM mg/dL 8 1*   < > 7 9*   ALK PHOS U/L  --   --  100   ALT U/L  --   --  137*   AST U/L  --   --  110*    < > = values in this interval not displayed         Scheduled Meds:  Current Facility-Administered Medications   Medication Dose Route Frequency Provider Last Rate    acetaminophen  650 mg Oral Q6H PRN Adriana Santana MD      allopurinol  300 mg Oral Daily Debi Macedo PA-C      aspirin  81 mg Oral Daily Heena Dempsey DO      atorvastatin  40 mg Oral Daily With SETH Sy      citalopram  40 mg Oral Daily Debi House PA-C      clopidogrel  75 mg Oral Daily Debi House PA-C      heparin (porcine)  5,000 Units Subcutaneous [facility] Sonia Dean MD      insulin glargine  28 Units Subcutaneous Q12H [address] Sonia Dean MD      insulin lispro  1-6 Units Subcutaneous 4x Daily (AC & HS) Debi Macedo PA-C      isosorbide mononitrate  60 mg Oral Daily Debi House PA-C      levothyroxine  50 mcg Oral Early Morning Debi House PA-C      Lidocaine Viscous HCl  10 mL Swish & Spit 4x Daily PRN Sonia Dean MD      OLANZapine  5 mg Oral HS Debi House PA-C      ondansetron  4 mg Intravenous Q6H PRN Debi House PA-C      oxyCODONE  5 mg Oral Q4H PRN Sonia Dean MD         Continuous Infusions:         Physical exam:  General appearance:  Alert no distress interaction appropriate  Head/Eyes:  Nonicteric PERRL EOMI  Neck:  Supple  Lungs:  Decreased BS bilateral basilar crackle  Heart: normal S1 S2 regular  Abdomen:  Obese nontender with bowel sounds  Extremities:  2+ edema  Skin: no rash    Invasive Devices  Report    Peripheral Intravenous Line  Duration           Peripheral IV 06/22/22 Right Antecubital 3 days    Peripheral IV 06/25/22 Distal;Left;Upper;Ventral (anterior) Arm <1 day          Drain  Duration           Suprapubic Catheter Non-latex 16 Fr  261 days                  VTE Pharmacologic Prophylaxis:  Heparin    Counseling / Coordination of Care  Total floor / unit time spent today 30  minutes  Greater than 50% of total time was spent with the patient and / or family counseling and / or coordination of care    A description of the counseling / coordination of care:  Discussed with Cardiology

## 2022-06-25 NOTE — CASE MANAGEMENT
Case Management Discharge Planning Note    Patient name Hannah Hernández  Location [facility] 4 /E4 -* MRN [id number]  : 1960 Date 2022       Current Admission Date: 2022  Current Admission Diagnosis:Syncope   Patient Active Problem List    Diagnosis Date Noted    Hyponatremia 2022    History of anemia due to chronic kidney disease 2022    Bradycardia 2022    Headache 2022    Syncope 2022    Acute renal failure superimposed on chronic kidney disease ([address] ) 2022    Chest pain 2022    Elevated troponin I level 2022    Hypotension 2022    Gastroenteritis 2022    ALFRED (acute kidney injury) ([facility] ) 2022    Encounter for examination following treatment at hospital 2022    Other artificial openings of urinary tract status ([facility] ) 02/10/2022    Continuous opioid dependence ([facility] ) 02/10/2022    Adrenal nodule ([facility] ) 02/10/2022    Stable angina ([facility] ) 02/10/2022    Volume overload 02/10/2022    Acquired hypothyroidism 10/14/2021    Mixed hyperlipidemia 10/14/2021    Gastroesophageal reflux disease without esophagitis 10/13/2021    Other proteinuria 2021    Chronic combined systolic and diastolic CHF (congestive heart failure) ([facility] ) 2021    Prolonged QT interval 2021    Urinary tract infection associated with cystostomy catheter ([facility] ) 2021    Neurogenic bladder 2021    Morbid obesity with BMI of 45 0-49 9, adult ([facility] ) 06/15/2021    History of heart artery stent 06/15/2021    Stage 4 chronic kidney disease ([address] ) 2021    Memory impairment 2021    Chronic bronchitis ([address] ) 2021    Severe episode of recurrent major depressive disorder, without psychotic features ([facility] ) 2021    Skin ulcer of hand, limited to breakdown of skin ([facility] ) 2021    HTN (hypertension) 2020    Diabetic ulcer of toe of right foot associated with type 2 diabetes mellitus, with muscle involvement without evidence of necrosis ([facility] ) 11/25/2020    Head lump 11/11/2020    Anemia 09/09/2020    Abnormal LFTs 09/09/2020    S/P cervical spinal fusion 09/09/2020    Major depressive disorder 09/02/2020    Type 2 diabetes mellitus with stage 4 chronic kidney disease, with long-term current use of insulin ([facility] ) 09/02/2020    Anxiety 09/02/2020    CAD (coronary artery disease) 09/02/2020    Osteoarthritis of left knee 09/02/2020    Cellulitis of finger of right hand 08/26/2020      LOS (days): 3  Geometric Mean LOS (GMLOS) (days): 3 70  Days to GMLOS:0 1     OBJECTIVE:  Risk of Unplanned Readmission Score: 46 75         Current admission status: Inpatient   Preferred Pharmacy:   [address] [place] [address], [address] [place] [address]  5 [address] [address]  Phone: [phone] Fax: [phone]    Primary Care Provider: CHERELLE Chun    Primary Insurance: [facility]  Secondary Insurance: [address] [place] [address],[address]    DISCHARGE DETAILS:    Comments - Freedom of Choice: PT recommending HHPT/OT ref sent -check with pt which agency she is agreeable with     Other Referral/Resources/Interventions Provided:  Interventions: Eric Fonseca    Treatment Team Recommendation: Home with [address]  Discharge Destination Plan[de-identified]  (TBD)

## 2022-06-25 NOTE — PROGRESS NOTES
Progress Note - Cardiology   Osmani Bagley 63 y o  female MRN: [id number]  Unit/Bed#: E4 -01 Encounter: [id number]    Assessment:     Coronary artery disease with multiple PCIs and 5 stents  Intermittent chest discomfort although somewhat atypical   Elevation of troponin on admission to 9361  Drop in ejection fraction in the past year from 45% to 30% with the development of multiple wall motion abnormalities   Acute systolic CHF compensated at the time of hospital admission   Chronic kidney disease stage IV  Baseline creatinine was 2 5-2 9 and present creatinine is 4 2   Near-syncope suggestive of a brief episode of orthostatic hypotension   Nausea, diarrhea, anorexia:  Probable gastritis POA   Hypertension   Dyslipidemia   Chronic anemia   Diabetes mellitus type 2   Polycystic ovary syndrome   Fibromyalgia   History neurogenic bladder with suprapubic catheter   Obstructive sleep apnea, does not use CPAP      Plan:     Nuclear stress test on Monday to further risk stratify her      Interval history:  Patient extremely soft sleepy and hard to keep awake to have a conversation with her  She says that she continues to have some chest discomfort  It is occurring at rest and not with exertion although she is not active  She describes it as a substernal squeezing which only lasts for seconds  The short duration is very atypical of anginal discomfort  On the other hand her troponin peaked at 9361  Her echo shows a decrease in EF from 45% to 30% with multiple wall motion abnormalities  Her creatinine is 4 18 and her BUN 86  It is probably not going to be long in till she is on dialysis  Would be unfortunate if waiting for her to begin dialysis, she would have a massive myocardial infarction or sudden death  Her ejection fraction is in the range where she is a candidate for an ICD if it does not improve        PROBLEM LIST:    Patient Active Problem List    Diagnosis Date Noted    Other artificial openings of urinary tract status ([facility] ) 02/10/2022    Continuous opioid dependence ([facility] ) 02/10/2022    Adrenal nodule ([facility] ) 02/10/2022    Stable angina ([facility] ) 02/10/2022    Volume overload 02/10/2022    Acquired hypothyroidism 10/14/2021    Mixed hyperlipidemia 10/14/2021    Gastroesophageal reflux disease without esophagitis 10/13/2021    Other proteinuria 09/14/2021    Chronic combined systolic and diastolic CHF (congestive heart failure) ([facility] ) 08/24/2021    Prolonged QT interval 08/24/2021    Urinary tract infection associated with cystostomy catheter ([place] [address] ) 08/23/2021    Neurogenic bladder 08/23/2021    Morbid obesity with BMI of 45 0-49 9, adult ([place] [address] ) 06/15/2021    History of heart artery stent 06/15/2021    Stage 4 chronic kidney disease ([place] [address] ) 06/04/2021    Memory impairment 05/26/2021    Chronic bronchitis ([place] [address] ) 05/25/2021    Severe episode of recurrent major depressive disorder, without psychotic features ([place] [address] ) 05/25/2021    Skin ulcer of hand, limited to breakdown of skin ([place] [address] ) 02/25/2021    HTN (hypertension) 12/21/2020    Diabetic ulcer of toe of right foot associated with type 2 diabetes mellitus, with muscle involvement without evidence of necrosis [facility]  ) 11/25/2020    Head lump 11/11/2020    Anemia 09/09/2020    Abnormal LFTs 09/09/2020    S/P cervical spinal fusion 09/09/2020    Major depressive disorder 09/02/2020    Type 2 diabetes mellitus with stage 4 chronic kidney disease, with long-term current use of insulin ([place] [address] ) 09/02/2020    Anxiety 09/02/2020    CAD (coronary artery disease) 09/02/2020    Osteoarthritis of left knee 09/02/2020    Cellulitis of finger of right hand 08/26/2020    Hyponatremia 06/23/2022    History of anemia due to chronic kidney disease 06/23/2022    Bradycardia 06/23/2022    Headache 06/23/2022    Syncope 06/22/2022    Acute renal failure superimposed on chronic kidney disease ([facility] ) 06/22/2022    Chest pain 06/22/2022    Elevated troponin I level 06/22/2022    Hypotension 06/22/2022    Gastroenteritis 06/22/2022    ALFRED (acute kidney injury) ([facility] ) 03/03/2022    Encounter for examination following treatment at hospital 02/22/2022       Vitals: /72 (BP Location: Left arm)   Pulse 67   Temp (!) 97 °F (36 1 °C) (Temporal)   Resp 18   Ht 5' 8" (1 727 m)   Wt 129 kg (285 lb 0 9 oz)   SpO2 94%   BMI 43 34 kg/m²   PREVIOUS WEIGHTS:   Weight (last 2 days)     Date/Time Weight    06/25/22 0600 129 (285 06)    06/24/22 0551 130 (287 48)    06/23/22 0600 129 (285 5)          Wt Readings from Last 2 Encounters:   06/25/22 129 kg (285 lb 0 9 oz)   06/19/22 128 kg (282 lb)       Labs/Data:        Results from last 7 days   Lab Units 06/25/22  0552 06/24/22  0421 06/23/22  0500   WBC Thousand/uL 10 77* 12 79* 11 81*   HEMOGLOBIN g/dL 8 5* 8 1* 8 1*   HEMATOCRIT % 26 0* 24 6* 24 9*   MCV fL 101* 100* 100*   MCH pg 32 9 33 1 32 5   MCHC g/dL 32 7 32 9 32 5   PLATELETS Thousands/uL 298 274 255     Results from last 7 days   Lab Units 06/25/22  0552 06/24/22  0421 06/23/22  0500   EGFR ml/min/1 73sq m 12 10 9   SODIUM mmol/L 134* 128* 127*   POTASSIUM mmol/L 4 5 4 1 4 2   CHLORIDE mmol/L 100 97* 95*   CO2 mmol/L 24 22 21   BUN mg/dL 87* 86* 88*   CREATININE mg/dL 3 68* 4 18* 4 52*     Results from last 7 days   Lab Units 06/25/22  0552 06/24/22  0421 06/23/22  0500 06/22/22  0637 06/21/22  2249 06/19/22  0315   CALCIUM mg/dL 8 1* 7 5* 7 5* 7 9* 7 9* 8 7   AST U/L  --   --   --  110* 92* 52*   ALT U/L  --   --   --  137* 110* 21   ALK PHOS U/L  --   --   --  100 103 93   MAGNESIUM mg/dL  --   --   --   --   --  2 5*         Lab Results   Component Value Date    NTBNP 2,094 (H) 02/10/2022    NTBNP 985 (H) 08/23/2021     Lab Results   Component Value Date    CHOLESTEROL 120 06/24/2022    TRIG 138 06/24/2022    HDL 42 (L) 06/24/2022    LDLCALC 50 06/24/2022    HGBA1C 7 3 (A) 03/29/2022       Results from last 7 days   Lab Units 06/22/22  0027   INR  1 21*   PROTIME seconds 14 9*          Current Facility-Administered Medications:     acetaminophen (TYLENOL) tablet 650 mg, 650 mg, Oral, Q6H PRN, Anli Vasquez MD, 650 mg at 06/25/22 0755    allopurinol (ZYLOPRIM) tablet 300 mg, 300 mg, Oral, Daily, Debi House PA-C, 300 mg at 06/25/22 0807    aspirin chewable tablet 81 mg, 81 mg, Oral, Daily, Cee Arevalo DO, 81 mg at 06/25/22 0807    atorvastatin (LIPITOR) tablet 40 mg, 40 mg, Oral, Daily With Wanda House PA-C, 40 mg at 06/24/22 1540    citalopram (CeleXA) tablet 40 mg, 40 mg, Oral, Daily, Debi House PA-C, 40 mg at 06/25/22 0807    clopidogrel (PLAVIX) tablet 75 mg, 75 mg, Oral, Daily, Debi House PA-C, 75 mg at 06/25/22 0806    heparin (porcine) subcutaneous injection 5,000 Units, 5,000 Units, Subcutaneous, Q8H [address], Wilfredo Moser MD, 5,000 Units at 06/25/22 0516    insulin glargine (LANTUS) subcutaneous injection 30 Units 0 3 mL, 30 Units, Subcutaneous, Q12H [address], Martinez Beck DO    insulin lispro (HumaLOG) 100 units/mL subcutaneous injection 1-6 Units, 1-6 Units, Subcutaneous, 4x Daily (AC & HS), 3 Units at 06/25/22 0754 **AND** Fingerstick Glucose (POCT), , , 4x Daily AC and at bedtime, Debi House PA-C    isosorbide mononitrate (IMDUR) 24 hr tablet 60 mg, 60 mg, Oral, Daily, Debi House PA-C, 60 mg at 06/25/22 0806    levothyroxine tablet 50 mcg, 50 mcg, Oral, Early Morning, Debi House PA-C, 50 mcg at 06/25/22 0516    Lidocaine Viscous HCl (XYLOCAINE) 2 % mucosal solution 10 mL, 10 mL, Swish & Spit, 4x Daily PRN, Wilfredo Moser MD    OLANZapine (ZyPREXA) tablet 5 mg, 5 mg, Oral, HS, Debi House PA-C, 5 mg at 06/24/22 2115    ondansetron (ZOFRAN) injection 4 mg, 4 mg, Intravenous, Q6H PRN, Debi House PA-C, 4 mg at 06/22/22 1755    oxyCODONE (ROXICODONE) IR tablet 5 mg, 5 mg, Oral, Q4H PRN, Wilfredo Moser MD, 5 mg at 06/25/22 1230  Allergies   Allergen Reactions    Codeine      Other reaction(s): Nausea and Vomiting    Latex Itching         Intake/Output Summary (Last 24 hours) at 6/25/2022 1142  Last data filed at 6/25/2022 0701  Gross per 24 hour   Intake --   Output 3100 ml   Net -3100 ml       Invasive Devices  Report    Peripheral Intravenous Line  Duration           Peripheral IV 06/22/22 Right Antecubital 3 days    Peripheral IV 06/25/22 Distal;Left;Upper;Ventral (anterior) Arm <1 day          Drain  Duration           Suprapubic Catheter Non-latex 16 Fr  261 days                Review of Systems   Respiratory: Negative for cough, choking, chest tightness, shortness of breath and wheezing  Cardiovascular: Negative for chest pain, palpitations and leg swelling  Musculoskeletal: Negative for gait problem  Skin: Negative for rash  Neurological: Negative for dizziness, tremors, syncope, weakness, light-headedness, numbness and headaches  Psychiatric/Behavioral: Negative for agitation and behavioral problems  The patient is not hyperactive  Physical Exam  Constitutional:       General: She is not in acute distress  Appearance: She is well-developed  Comments: Morbidly obese, BMI 43 3   HENT:      Head: Normocephalic and atraumatic  Neck:      Thyroid: No thyromegaly  Vascular: No carotid bruit or JVD  Trachea: No tracheal deviation  Cardiovascular:      Rate and Rhythm: Normal rate and regular rhythm  Pulses: Normal pulses  Heart sounds: Normal heart sounds  No murmur heard  No friction rub  No gallop  Pulmonary:      Effort: Pulmonary effort is normal  No respiratory distress  Breath sounds: Normal breath sounds  No wheezing, rhonchi or rales  Chest:      Chest wall: No tenderness  Musculoskeletal:         General: Normal range of motion  Cervical back: Normal range of motion and neck supple  Right lower leg: No edema  Left lower leg: No edema     Skin: General: Skin is warm and dry  Neurological:      General: No focal deficit present  Mental Status: She is alert and oriented to person, place, and time  Gait: Gait normal    Psychiatric:         Mood and Affect: Mood normal          Behavior: Behavior normal          Thought Content: Thought content normal          Judgment: Judgment normal          ======================================================     Imaging:   I have personally reviewed pertinent reports  EKG:  Sinus rhythm with intraventricular conduction delay      Portions of the record may have been created with voice recognition software  Occasional wrong word or "sound a like" substitutions may have occurred due to the inherent limitations of voice recognition software  Read the chart carefully and recognize, using context, where substitutions have occurred

## 2022-06-25 NOTE — NURSING NOTE
Patient was complaining of being groggy this afternoon  Explained to the patient that her kidney function is not all that great, so she's hanging onto the narcotics and feeling groggy  Physician was notified  Ordered blood gas and narcan (if needed)  This evening, patient was complaining of pain  Tylenol given to the patient as well as Lidocaine solution for discomfort of mouth and generalized pain  Patient was asking why not the other medication? Patient was informed that she only had the tylenol because she was sleepy for the past 2 days and now has trouble going to the bathroom  Patient was pleasant to this nurse for the past 3 days up until this point when she became verbally aggressive and began yelling at staff and another individual over the phone  Door was closed to patient's room at her request in order to de-esculate the situation

## 2022-06-26 LAB
ANION GAP SERPL CALCULATED.3IONS-SCNC: 8 MMOL/L (ref 4–13)
BASOPHILS # BLD AUTO: 0.03 THOUSANDS/ΜL (ref 0–0.1)
BASOPHILS NFR BLD AUTO: 0 % (ref 0–1)
BUN SERPL-MCNC: 82 MG/DL (ref 5–25)
CALCIUM SERPL-MCNC: 8.1 MG/DL (ref 8.3–10.1)
CHLORIDE SERPL-SCNC: 105 MMOL/L (ref 100–108)
CO2 SERPL-SCNC: 24 MMOL/L (ref 21–32)
CREAT SERPL-MCNC: 3.22 MG/DL (ref 0.6–1.3)
EOSINOPHIL # BLD AUTO: 0.28 THOUSAND/ΜL (ref 0–0.61)
EOSINOPHIL NFR BLD AUTO: 3 % (ref 0–6)
ERYTHROCYTE [DISTWIDTH] IN BLOOD BY AUTOMATED COUNT: 15.9 % (ref 11.6–15.1)
GFR SERPL CREATININE-BSD FRML MDRD: 15 ML/MIN/1.73SQ M
GLUCOSE SERPL-MCNC: 137 MG/DL (ref 65–140)
GLUCOSE SERPL-MCNC: 144 MG/DL (ref 65–140)
GLUCOSE SERPL-MCNC: 159 MG/DL (ref 65–140)
GLUCOSE SERPL-MCNC: 184 MG/DL (ref 65–140)
GLUCOSE SERPL-MCNC: 206 MG/DL (ref 65–140)
HCT VFR BLD AUTO: 23.8 % (ref 34.8–46.1)
HGB BLD-MCNC: 7.9 G/DL (ref 11.5–15.4)
IMM GRANULOCYTES # BLD AUTO: 0.1 THOUSAND/UL (ref 0–0.2)
IMM GRANULOCYTES NFR BLD AUTO: 1 % (ref 0–2)
LYMPHOCYTES # BLD AUTO: 2.01 THOUSANDS/ΜL (ref 0.6–4.47)
LYMPHOCYTES NFR BLD AUTO: 18 % (ref 14–44)
MAGNESIUM SERPL-MCNC: 3.1 MG/DL (ref 1.6–2.6)
MCH RBC QN AUTO: 33.5 PG (ref 26.8–34.3)
MCHC RBC AUTO-ENTMCNC: 33.2 G/DL (ref 31.4–37.4)
MCV RBC AUTO: 101 FL (ref 82–98)
MONOCYTES # BLD AUTO: 1.01 THOUSAND/ΜL (ref 0.17–1.22)
MONOCYTES NFR BLD AUTO: 9 % (ref 4–12)
NEUTROPHILS # BLD AUTO: 7.53 THOUSANDS/ΜL (ref 1.85–7.62)
NEUTS SEG NFR BLD AUTO: 69 % (ref 43–75)
NRBC BLD AUTO-RTO: 0 /100 WBCS
PHOSPHATE SERPL-MCNC: 5.1 MG/DL (ref 2.7–4.5)
PLATELET # BLD AUTO: 296 THOUSANDS/UL (ref 149–390)
PMV BLD AUTO: 10.1 FL (ref 8.9–12.7)
POTASSIUM SERPL-SCNC: 5 MMOL/L (ref 3.5–5.3)
RBC # BLD AUTO: 2.36 MILLION/UL (ref 3.81–5.12)
SODIUM SERPL-SCNC: 137 MMOL/L (ref 136–145)
WBC # BLD AUTO: 10.96 THOUSAND/UL (ref 4.31–10.16)

## 2022-06-26 PROCEDURE — 99232 SBSQ HOSP IP/OBS MODERATE 35: CPT | Performed by: INTERNAL MEDICINE

## 2022-06-26 PROCEDURE — 80048 BASIC METABOLIC PNL TOTAL CA: CPT | Performed by: INTERNAL MEDICINE

## 2022-06-26 PROCEDURE — 84100 ASSAY OF PHOSPHORUS: CPT | Performed by: NURSE PRACTITIONER

## 2022-06-26 PROCEDURE — 82948 REAGENT STRIP/BLOOD GLUCOSE: CPT

## 2022-06-26 PROCEDURE — 83735 ASSAY OF MAGNESIUM: CPT | Performed by: NURSE PRACTITIONER

## 2022-06-26 PROCEDURE — 85025 COMPLETE CBC W/AUTO DIFF WBC: CPT | Performed by: INTERNAL MEDICINE

## 2022-06-26 RX ORDER — INSULIN LISPRO 100 [IU]/ML
10 INJECTION, SOLUTION INTRAVENOUS; SUBCUTANEOUS
Status: DISCONTINUED | OUTPATIENT
Start: 2022-06-26 | End: 2022-07-02 | Stop reason: HOSPADM

## 2022-06-26 RX ORDER — LABETALOL HYDROCHLORIDE 5 MG/ML
10 INJECTION, SOLUTION INTRAVENOUS ONCE
Status: COMPLETED | OUTPATIENT
Start: 2022-06-26 | End: 2022-06-26

## 2022-06-26 RX ORDER — HYDROCODONE BITARTRATE AND ACETAMINOPHEN 5; 325 MG/1; MG/1
1 TABLET ORAL EVERY 6 HOURS PRN
Status: DISCONTINUED | OUTPATIENT
Start: 2022-06-26 | End: 2022-07-02 | Stop reason: HOSPADM

## 2022-06-26 RX ADMIN — CLOPIDOGREL BISULFATE 75 MG: 75 TABLET ORAL at 09:13

## 2022-06-26 RX ADMIN — INSULIN GLARGINE 30 UNITS: 100 INJECTION, SOLUTION SUBCUTANEOUS at 09:18

## 2022-06-26 RX ADMIN — ISOSORBIDE MONONITRATE 60 MG: 60 TABLET, EXTENDED RELEASE ORAL at 09:13

## 2022-06-26 RX ADMIN — INSULIN LISPRO 2 UNITS: 100 INJECTION, SOLUTION INTRAVENOUS; SUBCUTANEOUS at 12:39

## 2022-06-26 RX ADMIN — INSULIN LISPRO 10 UNITS: 100 INJECTION, SOLUTION INTRAVENOUS; SUBCUTANEOUS at 12:39

## 2022-06-26 RX ADMIN — HYDROCODONE BITARTRATE AND ACETAMINOPHEN 1 TABLET: 5; 325 TABLET ORAL at 09:12

## 2022-06-26 RX ADMIN — DOCUSATE SODIUM 50MG AND SENNOSIDES 8.6MG 1 TABLET: 8.6; 5 TABLET, FILM COATED ORAL at 09:13

## 2022-06-26 RX ADMIN — DOCUSATE SODIUM 50MG AND SENNOSIDES 8.6MG 1 TABLET: 8.6; 5 TABLET, FILM COATED ORAL at 17:40

## 2022-06-26 RX ADMIN — HEPARIN SODIUM 5000 UNITS: 5000 INJECTION INTRAVENOUS; SUBCUTANEOUS at 06:44

## 2022-06-26 RX ADMIN — ACETAMINOPHEN 325MG 650 MG: 325 TABLET ORAL at 01:22

## 2022-06-26 RX ADMIN — ASPIRIN 81 MG CHEWABLE TABLET 81 MG: 81 TABLET CHEWABLE at 09:13

## 2022-06-26 RX ADMIN — SORBITOL SOLUTION (BULK) 30 ML: 70 SOLUTION at 09:14

## 2022-06-26 RX ADMIN — CITALOPRAM HYDROBROMIDE 40 MG: 20 TABLET ORAL at 09:13

## 2022-06-26 RX ADMIN — LEVOTHYROXINE SODIUM 50 MCG: 50 TABLET ORAL at 06:44

## 2022-06-26 RX ADMIN — ALLOPURINOL 300 MG: 300 TABLET ORAL at 09:13

## 2022-06-26 RX ADMIN — LABETALOL HYDROCHLORIDE 10 MG: 5 INJECTION, SOLUTION INTRAVENOUS at 01:22

## 2022-06-26 RX ADMIN — HEPARIN SODIUM 5000 UNITS: 5000 INJECTION INTRAVENOUS; SUBCUTANEOUS at 14:15

## 2022-06-26 RX ADMIN — INSULIN LISPRO 10 UNITS: 100 INJECTION, SOLUTION INTRAVENOUS; SUBCUTANEOUS at 16:54

## 2022-06-26 RX ADMIN — ATORVASTATIN CALCIUM 40 MG: 40 TABLET, FILM COATED ORAL at 16:52

## 2022-06-26 RX ADMIN — INSULIN LISPRO 1 UNITS: 100 INJECTION, SOLUTION INTRAVENOUS; SUBCUTANEOUS at 16:54

## 2022-06-26 RX ADMIN — HYDROCODONE BITARTRATE AND ACETAMINOPHEN 1 TABLET: 5; 325 TABLET ORAL at 17:42

## 2022-06-26 NOTE — PLAN OF CARE
Problem: MOBILITY - ADULT  Goal: Maintain or return to baseline ADL function  Description: INTERVENTIONS:  -  Assess patient's ability to carry out ADLs; assess patient's baseline for ADL function and identify physical deficits which impact ability to perform ADLs (bathing, care of mouth/teeth, toileting, grooming, dressing, etc )  - Assess/evaluate cause of self-care deficits   - Assess range of motion  - Assess patient's mobility; develop plan if impaired  - Assess patient's need for assistive devices and provide as appropriate  - Encourage maximum independence but intervene and supervise when necessary  - Involve family in performance of ADLs  - Assess for home care needs following discharge   - Consider OT consult to assist with ADL evaluation and planning for discharge  - Provide patient education as appropriate  Outcome: Progressing  Goal: Maintains/Returns to pre admission functional level  Description: INTERVENTIONS:  - Perform BMAT or MOVE assessment daily    - Set and communicate daily mobility goal to care team and patient/family/caregiver  - Collaborate with rehabilitation services on mobility goals if consulted  - Perform Range of Motion  times a day  - Reposition patient every  hours  - Dangle patient  times a day  - Stand patient mes a day  - Ambulate patient  times a day  - Out of bed to chair  times a day   - Out of bed for meals  times a day  - Out of bed for toileting  - Record patient progress and toleration of activity level   Outcome: Progressing     Problem: Nutrition/Hydration-ADULT  Goal: Nutrient/Hydration intake appropriate for improving, restoring or maintaining nutritional needs  Description: Monitor and assess patient's nutrition/hydration status for malnutrition  Collaborate with interdisciplinary team and initiate plan and interventions as ordered  Monitor patient's weight and dietary intake as ordered or per policy  Utilize nutrition screening tool and intervene as necessary  Determine patient's food preferences and provide high-protein, high-caloric foods as appropriate       INTERVENTIONS:  - Monitor oral intake, urinary output, labs, and treatment plans  - Assess nutrition and hydration status and recommend course of action  - Evaluate amount of meals eaten  - Assist patient with eating if necessary   - Allow adequate time for meals  - Recommend/ encourage appropriate diets, oral nutritional supplements, and vitamin/mineral supplements  - Order, calculate, and assess calorie counts as needed  - Recommend, monitor, and adjust tube feedings and TPN/PPN based on assessed needs  - Assess need for intravenous fluids  - Provide specific nutrition/hydration education as appropriate  - Include patient/family/caregiver in decisions related to nutrition  Outcome: Progressing     Problem: Potential for Falls  Goal: Patient will remain free of falls  Description: INTERVENTIONS:  - Educate patient/family on patient safety including physical limitations  - Instruct patient to call for assistance with activity   - Consult OT/PT to assist with strengthening/mobility   - Keep Call bell within reach  - Keep bed low and locked with side rails adjusted as appropriate  - Keep care items and personal belongings within reach  - Initiate and maintain comfort rounds  - Make Fall Risk Sign visible to staff  - Offer Toileting every  Hours, in advance of need  - Initiate/Maintain alarm  - Obtain necessary fall risk management equipment:  - Apply yellow socks and bracelet for high fall risk patients  - Consider moving patient to room near nurses station  Outcome: Progressing

## 2022-06-26 NOTE — PROGRESS NOTES
Progress Note - Elham Millard 63 y o  female MRN: [id number]    Unit/Bed#: E4 -01 Encounter: [id number]    Assessment/Plan:    A KI/CKD 4   creatinine slowly improving 3 22 today, holding diuretics and monitoring with Nephrology    Chronic combined HF  currently holding diuretics with increased creatinine/ orthostatic changes, cardiology recommending nuclear stress tomorrow    Morbid obesity   would benefit from weight loss    Diabetes   a m  Glucose 137 continue Lantus and add Humalog before meals with sliding scale    Dyslipidemia   continue statin for LDL control    Hypothyroid   continue Synthroid 50 micrograms daily    Subjective:   Still feels weak and lightheaded with activity, resting no shortness of breath no chest pain no nausea vomiting no fevers chills appetite stable      Objective:     Vitals: Blood pressure 144/79, pulse 70, temperature (!) 97 1 °F (36 2 °C), temperature source Temporal, resp  rate 18, height 5' 8" (1 727 m), weight 129 kg (285 lb 0 9 oz), SpO2 93 %, not currently breastfeeding  ,Body mass index is 43 34 kg/m²  Results from last 7 days   Lab Units 06/26/22  0644 06/22/22  0637 06/22/22  0027   WBC Thousand/uL 10 96*   < >  --    HEMOGLOBIN g/dL 7 9*   < >  --    HEMATOCRIT % 23 8*   < >  --    PLATELETS Thousands/uL 296   < >  --    INR   --   --  1 21*    < > = values in this interval not displayed  Results from last 7 days   Lab Units 06/26/22  0644 06/23/22  0500 06/22/22  0637   POTASSIUM mmol/L 5 0   < > 4 2   CHLORIDE mmol/L 105   < > 97*   CO2 mmol/L 24   < > 22   BUN mg/dL 82*   < > 81*   CREATININE mg/dL 3 22*   < > 4 37*   CALCIUM mg/dL 8 1*   < > 7 9*   ALK PHOS U/L  --   --  100   ALT U/L  --   --  137*   AST U/L  --   --  110*    < > = values in this interval not displayed         Scheduled Meds:  Current Facility-Administered Medications   Medication Dose Route Frequency Provider Last Rate    acetaminophen  650 mg Oral Q6H PRN Natasha Culver MD  al mag oxide-diphenhydramine-lidocaine viscous  10 mL Swish & Swallow Q6H PRN CHERELLE Muñoz      allopurinol  300 mg Oral Daily Debi House PA-C      aluminum-magnesium hydroxide-simethicone  30 mL Oral Q4H PRN Adan Taylor, DO      aspirin  81 mg Oral Daily Zo Flores DO      atorvastatin  40 mg Oral Daily Billy Sy PA-C      citalopram  40 mg Oral Daily Debi House PA-C      clopidogrel  75 mg Oral Daily Debi House PA-C      heparin (porcine)  5,000 Units Subcutaneous [facility] Nura Desai MD      HYDROcodone-acetaminophen  1 tablet Oral Q6H PRN Adan Taylor, DO      insulin glargine  30 Units Subcutaneous Q12H [facility], DO      insulin lispro  1-6 Units Subcutaneous 4x Daily (AC & HS) Debi House PA-C      isosorbide mononitrate  60 mg Oral Daily Debi House PA-C      levothyroxine  50 mcg Oral Early Morning Debi House PA-C      naloxone  2 mg Intravenous Once Adan Taylor, DO      OLANZapine  5 mg Oral HS Debi House PA-C      ondansetron  4 mg Intravenous Q6H PRN Lakeisha Meier PA-C      senna-docusate sodium  1 tablet Oral BID Adan Taylor, DO      sorbitol  30 mL Oral Daily [facility], DO         Continuous Infusions:         Physical exam:  General appearance:  Sleepy, no distress interaction appropriate  Head/Eyes:  Nonicteric PERRL EOMI  Neck:  Supple  Lungs:  Decreased BS bilateral no wheezing rhonchi or rales  Heart: normal S1 S2 regular  Abdomen:  Obese nontender with bowel sounds  Extremities:  Trace edema  Skin: no rash multiple tattoos    Invasive Devices  Report    Peripheral Intravenous Line  Duration           Peripheral IV 06/26/22 Left;Ventral (anterior) Forearm <1 day          Drain  Duration           Suprapubic Catheter Non-latex 16 Fr  262 days                  VTE Pharmacologic Prophylaxis:  Heparin    Counseling / Coordination of Care  Total floor / unit time spent today 30  minutes  Greater than 50% of total time was spent with the patient and / or family counseling and / or coordination of care  A description of the counseling / coordination of care: Shanti Duke

## 2022-06-26 NOTE — PROGRESS NOTES
Progress Note - Nephrology   Kyrie Nina 63 y o  female MRN: [id number]  Unit/Bed#: E4 -01 Encounter: [id number]    82-year-old female with history of CKD IV, nephrotic range proteinuria, hypertension, diabetes, uncontrolled, CAD, chronic combined CHF, chronic suprapubic catheter who presented with syncope chest pain and was found to have acute kidney injury    Nephrology consulted and following for ALFRED on top of Chronic Kidney Disease          ASSESSMENT and PLAN:  Acute kidney injury (POA):  On top of Chronic Kidney Disease IV  Etiology: Suspect prerenal in the setting of GI losses, volume depletion leading to ATN  Assessment:  · After review of medical records through [address] [place] [address] it appears that the patient has a baseline Creatinine of 2 5-2 9  · Admission creatinine 4 23  · Peak creatinine 4 52 on 04/23/2022  · Current creatinine 3 22 improving off diuretics  · Diuretics remain on hold  · Clinically, patient is not uremic and there is no acute indication for renal replacement therapy   Plan:  · Avoid nephrotoxins, adjust meds to appropriate GFR  · Optimize hemodynamic status to avoid delay in renal recovery  · Avoid hypotension to prevent decreased renal perfusion  · Continue to hold diuretics and IV fluids for now  · Creatinine almost back to baseline  · Consider resuming diuretics in the next 24-48 hours continue to monitor for need  · Continue monitor volume status in the setting of known cardiomyopathy  · Monitor I/O, lab values volume status  · Check BMP in a m      Chronic kidney disease IV:    Etiology:  Suspect secondary to hypertensive nephrosclerosis, diabetic kidney disease, cardiorenal syndrome, multiple episodes of ALFRED as well as obesity related hypoperfusion  Assessment and Plan:  · After review of medical records through [facility] everywhere it appears that the patient has a baseline Creatinine of 2 5-2 9  · Patient follows with Dr Vandana Jones last seen 05/25/2022  · Recommend follow-up on discharge     Blood pressure/Hypertension:  Assessment and Plan:  · Current blood pressure:  Current blood pressure elevated  · Current medications include:  Imdur 60 mg daily  · Home medications all on hold-torsemide  · Maximize hemodynamics to maintain MAP >65  · Avoid hypotension or fluctuations in blood pressure  · Will continue to trend     H&H/anemia: Likely of CKD  Assessment and Plan:  · Current hemoglobin 7 9  · On oral iron  · Per primary team  · Transfuse if hemoglobin less than 7 0     Electrolytes:  Assessment and Plan:  Hyponatremia:  Improving slowly likely in the setting of volume depletion  · Current sodium improved to 137 off diuretics  · Continue to hold diuretics IV fluids for now  · Continue trend closely     Elevated troponins-decreased EF of 30%-cardiology following-with previously intermittent chest pain  · Patient for nuclear stress test tomorrow to further risk stratify patient in light of decreased EF and multiple new regional wall abnormalities  · Cardiology following      Other medical issues:  Diabetes II:  Per primary team  CAD status post multiple PCI/stents-follows cardiology as outpatient  Dyslipidemia  History of neurogenic bladder-status post suprapubic catheter  EUN-does not use CPAP           Review of systems  Patient seen and examined at bedside:  No new issues per report patient just wants to sleep today  Review of Systems   Constitutional: Negative  Negative for activity change, appetite change, chills, diaphoresis, fatigue and fever  HENT: Negative  Negative for congestion and facial swelling  Respiratory: Negative  Cardiovascular: Negative  Gastrointestinal: Negative  Endocrine: Negative  Genitourinary: Negative  Musculoskeletal: Negative  Skin: Negative  Allergic/Immunologic: Negative  Neurological: Negative  Hematological: Negative  Psychiatric/Behavioral: Negative             Physical exam:  Current Weight: Weight - Scale: 129 kg (285 lb 0 9 oz)  Vitals:    06/26/22 0053 06/26/22 0122 06/26/22 0736 06/26/22 1122   BP: (!) 176/79 155/72 158/72 144/79   BP Location: Right arm  Left arm Left arm   Pulse: 75  69 70   Resp: 18  18 18   Temp: 97 5 °F (36 4 °C)  (!) 96 9 °F (36 1 °C) (!) 97 1 °F (36 2 °C)   TempSrc: Temporal  Temporal Temporal   SpO2: 96%  96% 93%   Weight:       Height:           Intake/Output Summary (Last 24 hours) at 6/26/2022 1200  Last data filed at 6/26/2022 0737  Gross per 24 hour   Intake --   Output 2600 ml   Net -2600 ml       Physical Exam  Vitals and nursing note reviewed  Constitutional:       Appearance: Normal appearance  She is obese  Comments: No acute distress lying flat   HENT:      Head: Normocephalic and atraumatic  Nose: Nose normal       Mouth/Throat:      Mouth: Mucous membranes are dry  Pharynx: Oropharynx is clear  Eyes:      Extraocular Movements: Extraocular movements intact  Conjunctiva/sclera: Conjunctivae normal    Cardiovascular:      Rate and Rhythm: Normal rate and regular rhythm  Pulses: Normal pulses  Heart sounds: Normal heart sounds  Pulmonary:      Effort: Pulmonary effort is normal       Breath sounds: Normal breath sounds  Abdominal:      General: Bowel sounds are normal       Palpations: Abdomen is soft  Genitourinary:     Comments: Suprapubic catheter through draining clear yellow urine  Musculoskeletal:         General: Normal range of motion  Cervical back: Neck supple  Skin:     General: Skin is warm and dry  Neurological:      General: No focal deficit present  Mental Status: She is alert and oriented to person, place, and time  Psychiatric:         Mood and Affect: Mood normal          Behavior: Behavior normal          Thought Content:  Thought content normal          Judgment: Judgment normal             Medications:    Current Facility-Administered Medications:     acetaminophen (TYLENOL) tablet 650 mg, 650 mg, Oral, Q6H PRN, Jayson Campo MD, 650 mg at 06/26/22 0122    al mag oxide-diphenhydramine-lidocaine viscous (MAGIC MOUTHWASH) suspension 10 mL, 10 mL, Swish & Swallow, Q6H PRN, CHERELLE Del Angel, 10 mL at 06/25/22 2129    allopurinol (ZYLOPRIM) tablet 300 mg, 300 mg, Oral, Daily, Debi House PA-C, 300 mg at 06/26/22 0913    aluminum-magnesium hydroxide-simethicone (MYLANTA) oral suspension 30 mL, 30 mL, Oral, Q4H PRN, James Pop DO    aspirin chewable tablet 81 mg, 81 mg, Oral, Daily, Chet London DO, 81 mg at 06/26/22 0913    atorvastatin (LIPITOR) tablet 40 mg, 40 mg, Oral, Daily With Ryder House PA-C, 40 mg at 06/25/22 1737    citalopram (CeleXA) tablet 40 mg, 40 mg, Oral, Daily, Debi House PA-C, 40 mg at 06/26/22 0913    clopidogrel (PLAVIX) tablet 75 mg, 75 mg, Oral, Daily, Debi House PA-C, 75 mg at 06/26/22 0913    heparin (porcine) subcutaneous injection 5,000 Units, 5,000 Units, Subcutaneous, Q8H [address], Jocelin Luna MD, 5,000 Units at 06/26/22 0644    HYDROcodone-acetaminophen (NORCO) 5-325 mg per tablet 1 tablet, 1 tablet, Oral, Q6H PRN, James Pop DO, 1 tablet at 06/26/22 0912    insulin glargine (LANTUS) subcutaneous injection 30 Units 0 3 mL, 30 Units, Subcutaneous, Q12H [address], James Pop DO, 30 Units at 06/26/22 0918    insulin lispro (HumaLOG) 100 units/mL subcutaneous injection 1-6 Units, 1-6 Units, Subcutaneous, 4x Daily (AC & HS), 4 Units at 06/25/22 2130 **AND** Fingerstick Glucose (POCT), , , 4x Daily AC and at bedtime, Debi House PA-C    insulin lispro (HumaLOG) 100 units/mL subcutaneous injection 10 Units, 10 Units, Subcutaneous, TID With Meals, James Pop DO    isosorbide mononitrate (IMDUR) 24 hr tablet 60 mg, 60 mg, Oral, Daily, Debi House PA-C, 60 mg at 06/26/22 0913    levothyroxine tablet 50 mcg, 50 mcg, Oral, Early Morning, Debi House PA-C, 50 mcg at 06/26/22 0644    naloxone (NARCAN) injection 2 mg, 2 mg, Intravenous, Once, Chasity Gambino DO    OLANZapine (ZyPREXA) tablet 5 mg, 5 mg, Oral, HS, Debi House PA-C, 5 mg at 06/25/22 2128    ondansetron (ZOFRAN) injection 4 mg, 4 mg, Intravenous, Q6H PRN, Debi House PA-C, 4 mg at 06/22/22 1755    senna-docusate sodium (SENOKOT S) 8 6-50 mg per tablet 1 tablet, 1 tablet, Oral, BID, Chasity Gambino DO, 1 tablet at 06/26/22 0913    sorbitol 70 % solution 30 mL, 30 mL, Oral, Daily, Chasity Gambino DO, 30 mL at 06/26/22 3956    Laboratory Results:  Results from last 7 days   Lab Units 06/26/22  0644 06/25/22  0552 06/24/22  0421 06/23/22  0500 06/22/22  0637 06/21/22  2249   WBC Thousand/uL 10 96* 10 77* 12 79* 11 81* 12 97* 13 06*   HEMOGLOBIN g/dL 7 9* 8 5* 8 1* 8 1* 8 1* 8 4*   HEMATOCRIT % 23 8* 26 0* 24 6* 24 9* 24 8* 26 3*   PLATELETS Thousands/uL 296 298 274 255 265 276   SODIUM mmol/L 137 134* 128* 127* 130* 129*   POTASSIUM mmol/L 5 0 4 5 4 1 4 2 4 2 4 2   CHLORIDE mmol/L 105 100 97* 95* 97* 96*   CO2 mmol/L 24 24 22 21 22 24   BUN mg/dL 82* 87* 86* 88* 81* 78*   CREATININE mg/dL 3 22* 3 68* 4 18* 4 52* 4 37* 4 23*   CALCIUM mg/dL 8 1* 8 1* 7 5* 7 5* 7 9* 7 9*   MAGNESIUM mg/dL 3 1*  --   --   --   --   --    PHOSPHORUS mg/dL 5 1*  --   --   --   --   --

## 2022-06-26 NOTE — PROGRESS NOTES
Progress Note - Cardiology   Steve Dubon 63 y o  female MRN: [id number]  Unit/Bed#: E4 -01 Encounter: [id number]    Assessment:    · Coronary artery disease with multiple PCIs and 5 stents  Intermittent chest discomfort initially when in hospital   Patient denies chest discomfort since yesterday  · Troponin elevation on admission peaking at 9361  · Left ventricular ejection fraction dropped from 45% last year to 30% this year with multiple new regional wall motion abnormalities  · Patient in acute renal failure on admission but creatinine presently improving  Creatinine today 3 22  Patient's previous baseline was 2 5 -2 9 with chronic kidney disease stage 4  · Near-syncope prior to admission suggestive of brief episode of orthostatic hypotension  · Nausea, the diarrhea, anorexia presenting symptom probably secondary to gastritis now resolved but may have been a cause of dehydration  · Hypertension  · Dyslipidemia  · Chronic anemia  · Diabetes mellitus type 2  · Polycystic ovary  Syndrome  · Fibromyalgia  · History of neurogenic bladder with suprapubic catheter  · Obstructive sleep apnea, refuses use of CPAP      Plan:     Nuclear stress test tomorrow to further risk stratify patient with chest discomfort on admission and significantly elevated troponin level along with reduction in ejection fraction and multiple new regional wall motion abnormalities  Interval history:  No further chest pain overnight  Kidneys continue to improve  Creatinine 3 68-3 22  Patient for nuclear stress test tomorrow      PROBLEM LIST:    Patient Active Problem List    Diagnosis Date Noted    Other artificial openings of urinary tract status ([address] ) 02/10/2022    Continuous opioid dependence ([facility] ) 02/10/2022    Adrenal nodule ([facility] ) 02/10/2022    Stable angina ([facility] ) 02/10/2022    Volume overload 02/10/2022    Acquired hypothyroidism 10/14/2021    Mixed hyperlipidemia 10/14/2021    Gastroesophageal reflux disease without esophagitis 10/13/2021    Other proteinuria 09/14/2021    Chronic combined systolic and diastolic CHF (congestive heart failure) ([facility] ) 08/24/2021    Prolonged QT interval 08/24/2021    Urinary tract infection associated with cystostomy catheter ([facility] ) 08/23/2021    Neurogenic bladder 08/23/2021    Morbid obesity with BMI of 45 0-49 9, adult ([place] [id number] ) 06/15/2021    History of heart artery stent 06/15/2021    Stage 4 chronic kidney disease ([place] [id number] ) 06/04/2021    Memory impairment 05/26/2021    Chronic bronchitis ([place] [address] ) 05/25/2021    Severe episode of recurrent major depressive disorder, without psychotic features ([place] [address] ) 05/25/2021    Skin ulcer of hand, limited to breakdown of skin ([place] [address] ) 02/25/2021    HTN (hypertension) 12/21/2020    Diabetic ulcer of toe of right foot associated with type 2 diabetes mellitus, with muscle involvement without evidence of necrosis ([place] [address] ) 11/25/2020    Head lump 11/11/2020    Anemia 09/09/2020    Abnormal LFTs 09/09/2020    S/P cervical spinal fusion 09/09/2020    Major depressive disorder 09/02/2020    Type 2 diabetes mellitus with stage 4 chronic kidney disease, with long-term current use of insulin ([place] [address] ) 09/02/2020    Anxiety 09/02/2020    CAD (coronary artery disease) 09/02/2020    Osteoarthritis of left knee 09/02/2020    Cellulitis of finger of right hand 08/26/2020    Hyponatremia 06/23/2022    History of anemia due to chronic kidney disease 06/23/2022    Bradycardia 06/23/2022    Headache 06/23/2022    Syncope 06/22/2022    Acute renal failure superimposed on chronic kidney disease ([facility] ) 06/22/2022    Chest pain 06/22/2022    Elevated troponin I level 06/22/2022    Hypotension 06/22/2022    Gastroenteritis 06/22/2022    ALFRED (acute kidney injury) ([place] [address] ) 03/03/2022    Encounter for examination following treatment at hospital 02/22/2022       Vitals: /72 (BP Location: Left arm)   Pulse 69   Temp (!) 96 9 °F (36 1 °C) (Temporal)   Resp 18   Ht 5' 8" (1 727 m)   Wt 129 kg (285 lb 0 9 oz)   SpO2 96%   BMI 43 34 kg/m²   PREVIOUS WEIGHTS:   Weight (last 2 days)     Date/Time Weight    06/25/22 0600 129 (285 06)    06/24/22 0551 130 (287 48)          Wt Readings from Last 2 Encounters:   06/25/22 129 kg (285 lb 0 9 oz)   06/19/22 128 kg (282 lb)       Labs/Data:        Results from last 7 days   Lab Units 06/26/22  0644 06/25/22  0552 06/24/22  0421   WBC Thousand/uL 10 96* 10 77* 12 79*   HEMOGLOBIN g/dL 7 9* 8 5* 8 1*   HEMATOCRIT % 23 8* 26 0* 24 6*   MCV fL 101* 101* 100*   MCH pg 33 5 32 9 33 1   MCHC g/dL 33 2 32 7 32 9   PLATELETS Thousands/uL 296 298 274     Results from last 7 days   Lab Units 06/26/22  0644 06/25/22  0552 06/24/22  0421   EGFR ml/min/1 73sq m 15 12 10   SODIUM mmol/L 137 134* 128*   POTASSIUM mmol/L 5 0 4 5 4 1   CHLORIDE mmol/L 105 100 97*   CO2 mmol/L 24 24 22   BUN mg/dL 82* 87* 86*   CREATININE mg/dL 3 22* 3 68* 4 18*     Results from last 7 days   Lab Units 06/26/22  0644 06/25/22  0552 06/24/22  0421 06/23/22  0500 06/22/22  0637 06/21/22  2249   CALCIUM mg/dL 8 1* 8 1* 7 5*   < > 7 9* 7 9*   AST U/L  --   --   --   --  110* 92*   ALT U/L  --   --   --   --  137* 110*   ALK PHOS U/L  --   --   --   --  100 103   MAGNESIUM mg/dL 3 1*  --   --   --   --   --     < > = values in this interval not displayed           Lab Results   Component Value Date    NTBNP 2,094 (H) 02/10/2022    NTBNP 985 (H) 08/23/2021     Lab Results   Component Value Date    CHOLESTEROL 120 06/24/2022    TRIG 138 06/24/2022    HDL 42 (L) 06/24/2022    LDLCALC 50 06/24/2022    HGBA1C 7 3 (A) 03/29/2022       Results from last 7 days   Lab Units 06/22/22  0027   INR  1 21*   PROTIME seconds 14 9*          Current Facility-Administered Medications:     acetaminophen (TYLENOL) tablet 650 mg, 650 mg, Oral, Q6H PRN, Wellington Perez MD, 650 mg at 06/26/22 0122    al mag oxide-diphenhydramine-lidocaine viscous (MAGIC MOUTHWASH) suspension 10 mL, 10 mL, Swish & Swallow, Q6H PRN, Relda Qualia, CRNP, 10 mL at 06/25/22 2129    allopurinol (ZYLOPRIM) tablet 300 mg, 300 mg, Oral, Daily, Debi House PA-C, 300 mg at 06/26/22 0913    aluminum-magnesium hydroxide-simethicone (MYLANTA) oral suspension 30 mL, 30 mL, Oral, Q4H PRN, [facility]    aspirin chewable tablet 81 mg, 81 mg, Oral, Daily, Lizzy Luba, DO, 81 mg at 06/26/22 0913    atorvastatin (LIPITOR) tablet 40 mg, 40 mg, Oral, Daily With Yarelis House PA-C, 40 mg at 06/25/22 1737    citalopram (CeleXA) tablet 40 mg, 40 mg, Oral, Daily, Debi House PA-C, 40 mg at 06/26/22 0913    clopidogrel (PLAVIX) tablet 75 mg, 75 mg, Oral, Daily, Debi House PA-C, 75 mg at 06/26/22 0913    heparin (porcine) subcutaneous injection 5,000 Units, 5,000 Units, Subcutaneous, Q8H [address], Paul Barry MD, 5,000 Units at 06/26/22 0644    HYDROcodone-acetaminophen (NORCO) 5-325 mg per tablet 1 tablet, 1 tablet, Oral, Q6H PRN, [facility], 1 tablet at 06/26/22 0912    insulin glargine (LANTUS) subcutaneous injection 30 Units 0 3 mL, 30 Units, Subcutaneous, Q12H [address], [facility], DO, 30 Units at 06/26/22 0918    insulin lispro (HumaLOG) 100 units/mL subcutaneous injection 1-6 Units, 1-6 Units, Subcutaneous, 4x Daily (AC & HS), 4 Units at 06/25/22 2130 **AND** Fingerstick Glucose (POCT), , , 4x Daily AC and at bedtime, Debi House PA-C    isosorbide mononitrate (IMDUR) 24 hr tablet 60 mg, 60 mg, Oral, Daily, Debi House PA-C, 60 mg at 06/26/22 0913    levothyroxine tablet 50 mcg, 50 mcg, Oral, Early Morning, Debi House PA-C, 50 mcg at 06/26/22 0644    naloxone [facility]) injection 2 mg, 2 mg, Intravenous, Once, Daryl Mayfield DO    OLANZapine (ZyPREXA) tablet 5 mg, 5 mg, Oral, HS, Debi House PA-C, 5 mg at 06/25/22 2128    ondansetron (ZOFRAN) injection 4 mg, 4 mg, Intravenous, Q6H PRN, Debi House PA-C, 4 mg at 06/22/22 1339   senna-docusate sodium (SENOKOT S) 8 6-50 mg per tablet 1 tablet, 1 tablet, Oral, BID, Ryanne Patrick DO, 1 tablet at 06/26/22 0913    sorbitol 70 % solution 30 mL, 30 mL, Oral, Daily, Daryl Mayfield DO, 30 mL at 06/26/22 3133  Allergies   Allergen Reactions    Codeine      Other reaction(s): Nausea and Vomiting    Latex Itching         Intake/Output Summary (Last 24 hours) at 6/26/2022 1027  Last data filed at 6/26/2022 0996  Gross per 24 hour   Intake --   Output 2600 ml   Net -2600 ml       Invasive Devices  Report    Peripheral Intravenous Line  Duration           Peripheral IV 06/26/22 Left;Ventral (anterior) Forearm <1 day          Drain  Duration           Suprapubic Catheter Non-latex 16 Fr  262 days                Review of Systems   Respiratory: Negative for cough, choking, chest tightness, shortness of breath and wheezing  Cardiovascular: Negative for chest pain, palpitations and leg swelling  Musculoskeletal: Negative for gait problem  Skin: Negative for rash  Neurological: Negative for dizziness, tremors, syncope, weakness, light-headedness, numbness and headaches  Psychiatric/Behavioral: Negative for agitation and behavioral problems  The patient is not hyperactive  Physical Exam  Constitutional:       General: She is not in acute distress  Appearance: She is well-developed  Comments: Obesity BMI 43 3   HENT:      Head: Normocephalic and atraumatic  Neck:      Thyroid: No thyromegaly  Vascular: No carotid bruit or JVD  Trachea: No tracheal deviation  Cardiovascular:      Rate and Rhythm: Normal rate and regular rhythm  Pulses: Normal pulses  Heart sounds: Normal heart sounds  No murmur heard  No friction rub  No gallop  Pulmonary:      Effort: Pulmonary effort is normal  No respiratory distress  Breath sounds: Normal breath sounds  No wheezing, rhonchi or rales  Chest:      Chest wall: No tenderness     Musculoskeletal:         General: Normal range of motion  Cervical back: Normal range of motion and neck supple  Right lower leg: No edema  Left lower leg: No edema  Skin:     General: Skin is warm and dry  Neurological:      General: No focal deficit present  Mental Status: She is alert and oriented to person, place, and time  Psychiatric:         Mood and Affect: Mood normal          Behavior: Behavior normal          Thought Content: Thought content normal          Judgment: Judgment normal          ======================================================     Imaging:   I have personally reviewed pertinent reports  Portions of the record may have been created with voice recognition software  Occasional wrong word or "sound a like" substitutions may have occurred due to the inherent limitations of voice recognition software  Read the chart carefully and recognize, using context, where substitutions have occurred

## 2022-06-27 LAB
ANION GAP SERPL CALCULATED.3IONS-SCNC: 9 MMOL/L (ref 4–13)
BUN SERPL-MCNC: 72 MG/DL (ref 5–25)
CALCIUM SERPL-MCNC: 8.4 MG/DL (ref 8.3–10.1)
CHLORIDE SERPL-SCNC: 107 MMOL/L (ref 100–108)
CO2 SERPL-SCNC: 24 MMOL/L (ref 21–32)
CREAT SERPL-MCNC: 2.66 MG/DL (ref 0.6–1.3)
FERRITIN SERPL-MCNC: 80 NG/ML (ref 8–388)
GFR SERPL CREATININE-BSD FRML MDRD: 18 ML/MIN/1.73SQ M
GLUCOSE SERPL-MCNC: 123 MG/DL (ref 65–140)
GLUCOSE SERPL-MCNC: 128 MG/DL (ref 65–140)
GLUCOSE SERPL-MCNC: 172 MG/DL (ref 65–140)
GLUCOSE SERPL-MCNC: 89 MG/DL (ref 65–140)
GLUCOSE SERPL-MCNC: 97 MG/DL (ref 65–140)
GLUCOSE SERPL-MCNC: 98 MG/DL (ref 65–140)
IRON SATN MFR SERPL: 17 % (ref 15–50)
IRON SERPL-MCNC: 35 UG/DL (ref 50–170)
POTASSIUM SERPL-SCNC: 4.3 MMOL/L (ref 3.5–5.3)
SODIUM SERPL-SCNC: 140 MMOL/L (ref 136–145)
TIBC SERPL-MCNC: 212 UG/DL (ref 250–450)
VIT B12 SERPL-MCNC: 670 PG/ML (ref 100–900)

## 2022-06-27 PROCEDURE — 80048 BASIC METABOLIC PNL TOTAL CA: CPT | Performed by: INTERNAL MEDICINE

## 2022-06-27 PROCEDURE — 99232 SBSQ HOSP IP/OBS MODERATE 35: CPT | Performed by: INTERNAL MEDICINE

## 2022-06-27 PROCEDURE — 83550 IRON BINDING TEST: CPT | Performed by: STUDENT IN AN ORGANIZED HEALTH CARE EDUCATION/TRAINING PROGRAM

## 2022-06-27 PROCEDURE — 82607 VITAMIN B-12: CPT | Performed by: STUDENT IN AN ORGANIZED HEALTH CARE EDUCATION/TRAINING PROGRAM

## 2022-06-27 PROCEDURE — 97116 GAIT TRAINING THERAPY: CPT

## 2022-06-27 PROCEDURE — 83540 ASSAY OF IRON: CPT | Performed by: STUDENT IN AN ORGANIZED HEALTH CARE EDUCATION/TRAINING PROGRAM

## 2022-06-27 PROCEDURE — 99232 SBSQ HOSP IP/OBS MODERATE 35: CPT | Performed by: STUDENT IN AN ORGANIZED HEALTH CARE EDUCATION/TRAINING PROGRAM

## 2022-06-27 PROCEDURE — 82948 REAGENT STRIP/BLOOD GLUCOSE: CPT

## 2022-06-27 PROCEDURE — 82728 ASSAY OF FERRITIN: CPT | Performed by: STUDENT IN AN ORGANIZED HEALTH CARE EDUCATION/TRAINING PROGRAM

## 2022-06-27 RX ORDER — AMLODIPINE BESYLATE 10 MG/1
10 TABLET ORAL DAILY
Status: DISCONTINUED | OUTPATIENT
Start: 2022-06-27 | End: 2022-07-02 | Stop reason: HOSPADM

## 2022-06-27 RX ADMIN — CLOPIDOGREL BISULFATE 75 MG: 75 TABLET ORAL at 09:04

## 2022-06-27 RX ADMIN — INSULIN LISPRO 1 UNITS: 100 INJECTION, SOLUTION INTRAVENOUS; SUBCUTANEOUS at 00:26

## 2022-06-27 RX ADMIN — SORBITOL SOLUTION (BULK) 30 ML: 70 SOLUTION at 09:05

## 2022-06-27 RX ADMIN — ASPIRIN 81 MG CHEWABLE TABLET 81 MG: 81 TABLET CHEWABLE at 09:04

## 2022-06-27 RX ADMIN — INSULIN LISPRO 10 UNITS: 100 INJECTION, SOLUTION INTRAVENOUS; SUBCUTANEOUS at 16:42

## 2022-06-27 RX ADMIN — HEPARIN SODIUM 5000 UNITS: 5000 INJECTION INTRAVENOUS; SUBCUTANEOUS at 00:26

## 2022-06-27 RX ADMIN — LEVOTHYROXINE SODIUM 50 MCG: 50 TABLET ORAL at 06:56

## 2022-06-27 RX ADMIN — HEPARIN SODIUM 5000 UNITS: 5000 INJECTION INTRAVENOUS; SUBCUTANEOUS at 22:55

## 2022-06-27 RX ADMIN — AMLODIPINE BESYLATE 10 MG: 10 TABLET ORAL at 22:53

## 2022-06-27 RX ADMIN — INSULIN LISPRO 1 UNITS: 100 INJECTION, SOLUTION INTRAVENOUS; SUBCUTANEOUS at 22:53

## 2022-06-27 RX ADMIN — HEPARIN SODIUM 5000 UNITS: 5000 INJECTION INTRAVENOUS; SUBCUTANEOUS at 06:56

## 2022-06-27 RX ADMIN — ISOSORBIDE MONONITRATE 60 MG: 60 TABLET, EXTENDED RELEASE ORAL at 09:04

## 2022-06-27 RX ADMIN — INSULIN GLARGINE 30 UNITS: 100 INJECTION, SOLUTION SUBCUTANEOUS at 22:53

## 2022-06-27 RX ADMIN — ALLOPURINOL 300 MG: 300 TABLET ORAL at 09:04

## 2022-06-27 RX ADMIN — OLANZAPINE 5 MG: 5 TABLET, FILM COATED ORAL at 00:27

## 2022-06-27 RX ADMIN — HYDROCODONE BITARTRATE AND ACETAMINOPHEN 1 TABLET: 5; 325 TABLET ORAL at 09:05

## 2022-06-27 RX ADMIN — HYDROCODONE BITARTRATE AND ACETAMINOPHEN 1 TABLET: 5; 325 TABLET ORAL at 22:53

## 2022-06-27 RX ADMIN — ACETAMINOPHEN 325MG 650 MG: 325 TABLET ORAL at 14:11

## 2022-06-27 RX ADMIN — DOCUSATE SODIUM 50MG AND SENNOSIDES 8.6MG 1 TABLET: 8.6; 5 TABLET, FILM COATED ORAL at 09:04

## 2022-06-27 RX ADMIN — HEPARIN SODIUM 5000 UNITS: 5000 INJECTION INTRAVENOUS; SUBCUTANEOUS at 14:07

## 2022-06-27 RX ADMIN — DOCUSATE SODIUM 50MG AND SENNOSIDES 8.6MG 1 TABLET: 8.6; 5 TABLET, FILM COATED ORAL at 16:41

## 2022-06-27 RX ADMIN — CITALOPRAM HYDROBROMIDE 40 MG: 20 TABLET ORAL at 09:04

## 2022-06-27 RX ADMIN — INSULIN LISPRO 10 UNITS: 100 INJECTION, SOLUTION INTRAVENOUS; SUBCUTANEOUS at 12:47

## 2022-06-27 RX ADMIN — HYDROCODONE BITARTRATE AND ACETAMINOPHEN 1 TABLET: 5; 325 TABLET ORAL at 00:26

## 2022-06-27 RX ADMIN — INSULIN GLARGINE 30 UNITS: 100 INJECTION, SOLUTION SUBCUTANEOUS at 00:26

## 2022-06-27 RX ADMIN — OLANZAPINE 5 MG: 5 TABLET, FILM COATED ORAL at 22:53

## 2022-06-27 RX ADMIN — INSULIN GLARGINE 30 UNITS: 100 INJECTION, SOLUTION SUBCUTANEOUS at 09:04

## 2022-06-27 RX ADMIN — ATORVASTATIN CALCIUM 40 MG: 40 TABLET, FILM COATED ORAL at 16:41

## 2022-06-27 NOTE — ASSESSMENT & PLAN NOTE
Hypovolemic hyponatremia  Resolved with IV fluids  Etiology likely secondary to GI losses      Recent Labs     06/25/22  0552 06/26/22  0644 06/27/22  0606   SODIUM 134* 137 140

## 2022-06-27 NOTE — PROGRESS NOTES
CARDIOLOGY INPATIENT FOLLOW-UP PROGRESS NOTE  *-*-*-*-*-*-*-*-*-*-*-*-*-*-*-*-*-*-*-*-*-*-*-*-*-*-*-*-*-*-*-*-*-*-*-*-*-*-*-*-*-*-*-*-*-*-*-*-*-*-*-*-*-*-  OKXIPGIWY DATE: 06/27/22 6:11 PM   AUTHOR: Yadira Odom MD  PATIENT: Osmani Bagley   1960    [id number]   63 y o    female  INPATIENT HOSPITALIST PHYSICIAN: Pat Herrera DO  DATE OF ADMISSION: 6/21/2022 10:21 PM; LENGTH OF STAY: 5 days  *-*-*-*-*-*-*-*-*-*-*-*-*-*-*-*-*-*-*-*-*-*-*-*-*-*-*-*-*-*-*-*-*-*-*-*-*-*-*-*-*-*-*-*-*-*-*-*-*-*-*-*-*-*-    CARDIOLOGY ASSESSMENT:  1  Coronary artery disease with history of multiple PCIs in the past with chronic total occlusion of RCA, now with unspecified chest pain  2  Acute change in left ventricular function with EF down to 30% with new regional wall motion abnormalities  3  Acute renal failure superimposed on chronic kidney disease  4  Recent acute enteritis  5  Ischemic cardiomyopathy with moderate to markedly reduced left ventricular systolic function  6  Dyslipidemia  7  Anemia of chronic kidney disease  8  Diabetes mellitus  9  Polycystic ovarian syndrome  10  Fibromyalgia  11  Neurogenic bladder with suprapubic catheter in place  12  Uncorrected obstructive sleep apnea  13  13  Mild pulmonary hypertension    *-*-*-*-*-*-*-*-*-*-*-*-*-*-*-*-*-*-*-*-*-*-*-*-*-*-*-*-*-*-*-*-*-*-*-*-*-*-*-*-*-*-*-*-*-*-*-*-*-*-*-*-*-*-    CURRENT CARDIAC MEDICATIONS:  Aspirin 81 mg daily, atorvastatin 40 mg daily, clopidogrel 75 mg daily, isosorbide mononitrate 60 mg daily,    PERTINENT LABS AND OTHER INVESTIGATIONS:  Creatinine down to 2 66, sodium 140, potassium 4 3, BUN 72, magnesium 3 1 yesterday  Lipids well controlled    Peak troponin during current admission was 9361    ECHOCARDIOGRAM AND OTHER CARDIAC TESTS RESULTS:  Echocardiogram June 22, 2022 showed EF of 30% by visual assessment with marked regional wall motion abnormalities, mild left atrial cavity enlargement, normal right ventricular size and systolic function, no aortic valve stenosis or regurgitation, no mitral valve stenosis or regurgitation, mild tricuspid valve regurgitation, mild pulmonic valve regurgitation, mild pulmonary hypertension with estimated RVSP/PASP 39 mmHg  *-*-*-*-*-*-*-*-*-*-*-*-*-*-*-*-*-*-*-*-*-*-*-*-*-*-*-*-*-*-*-*-*-*-*-*-*-*-*-*-*-*-*-*-*-*-*-*-*-*-*-*-*-*-  Patient hemodynamically stable but with elevated blood pressures and significant headache  Renal function continues to improve  No typical angina-like symptoms  Plan is for regadenoson nuclear stress test tomorrow  PLAN:  -- adding amlodipine 10 mg daily with 1st dose to be given today  -- continue other cardiac medications  -- if blood pressure remains elevated will add additional evening isosorbide mononitrate  Can also increase the morning dose to 90 mg daily  *-*-*-*-*-*-*-*-*-*-*-*-*-*-*-*-*-*-*-*-*-*-*-*-*-*-*-*-*-*-*-*-*-*-*-*-*-*-*-*-*-*-*-*-*-*-*-*-*-*-*-*-*-*-  INTERVAL CHANGES / HISTORY OF PRESENT ILLNESS:   No acute events overnight  Patient reports severe frontal headache and pain in the roof of the mouth  She reports that she gets this intermittently  Denies chest pain or shortness of breath or lightheadedness or palpitations  Patient could not undergo nuclear stress test today       *-*-*-*-*-*-*-*-*-*-*-*-*-*-*-*-*-*-*-*-*-*-*-*-*-*-*-*-*-*-*-*-*-*-*-*-*-*-*-*-*-*-*-*-*-*-*-*-*-*-*-*-*-*    PERTINENT REVIEW OF SYMPTOMS:  As noted above in HPI    *-*-*-*-*-*-*-*-*-*-*-*-*-*-*-*-*-*-*-*-*-*-*-*-*-*-*-*-*-*-*-*-*-*-*-*-*-*-*-*-*-*-*-*-*-*-*-*-*-*-*-*-*-*-  VITAL SIGNS:  Vitals:    22 0026 22 0600 22 0700 22 1610   BP: 170/79  169/77 160/79   BP Location: Left arm  Left arm Left arm   Pulse: 75  73 75   Resp: 18  18 18   Temp: 98 7 °F (37 1 °C)  97 7 °F (36 5 °C) (!) 97 4 °F (36 3 °C)   TempSrc: Temporal  Temporal Temporal   SpO2: 94%  96% 97%   Weight:  127 kg (279 lb 8 7 oz)     Height:          Temp (24hrs), Av 9 °F (36 6 °C), Min:97 4 °F (36 3 °C), Max:98 7 °F (37 1 °C)  Current: Temperature: (!) 97 4 °F (36 3 °C)    Weight    06/21/22 2225 06/22/22 1342 06/23/22 0600 06/24/22 0551   Weight: 130 kg (286 lb 9 6 oz) 130 kg (286 lb) 129 kg (285 lb 7 9 oz) 130 kg (287 lb 7 7 oz)    06/25/22 0600 06/27/22 0600   Weight: 129 kg (285 lb 0 9 oz) 127 kg (279 lb 8 7 oz)      Body mass index is 42 5 kg/m²  Wt Readings from Last 3 Encounters:   06/27/22 127 kg (279 lb 8 7 oz)   06/19/22 128 kg (282 lb)   04/13/22 128 kg (282 lb)      Intake/Output Summary (Last 24 hours) at 6/27/2022 1811  Last data filed at 6/27/2022 1121  Gross per 24 hour   Intake 480 ml   Output 2300 ml   Net -1820 ml          *-*-*-*-*-*-*-*-*-*-*-*-*-*-*-*-*-*-*-*-*-*-*-*-*-*-*-*-*-*-*-*-*-*-*-*-*-*-*-*-*-*-*-*-*-*-*-*-*-*-*-*-*-*-  PHYSICAL EXAM:  General Appearance:    Alert, cooperative, in no respiratory distress, appears stated age, appears uncomfortable due to headache   Head, Eyes, ENT:    No obvious abnormality, moist mucous mebranes  Neck:   Supple, no carotid bruit or JVD   Back:     Symmetric, no curvature  Lungs:     Respirations unlabored  Clear to auscultation bilaterally,    Chest wall:    No tenderness or deformity   Heart:    Regular rate and rhythm, S1 and S2 normal, no murmur, rub  or gallop  Abdomen:     Soft, non-tender, has suprapubic catheter    Extremities:   Extremities warm, no cyanosis or edema    Skin:   Skin color, texture, turgor normal, no rashes or lesions     *-*-*-*-*-*-*-*-*-*-*-*-*-*-*-*-*-*-*-*-*-*-*-*-*-*-*-*-*-*-*-*-*-*-*-*-*-*-*-*-*-*-*-*-*-*-*-*-*-*-*-*-*-*-  TELEMETRY, LAST ECG:  Telemetry reviewed      Not on telemetry Results for orders placed or performed during the hospital encounter of 06/21/22   ECG 12 lead   Result Value    Ventricular Rate 47    Atrial Rate 47    OK Interval 168    QRSD Interval 152    QT Interval 510    QTC Interval 451    P Axis 32    QRS Axis 148    T Wave Axis -67    Narrative    Marked sinus bradycardia  Non-specific intra-ventricular conduction block  Possible Lateral infarct (cited on or before 22-JUN-2022)  Abnormal ECG  When compared with ECG of 22-JUN-2022 04:34, (unconfirmed)  No significant change was found  Confirmed by Jacqueline Oconnor ([id number]) on 6/22/2022 9:03:28 AM      *-*-*-*-*-*-*-*-*-*-*-*-*-*-*-*-*-*-*-*-*-*-*-*-*-*-*-*-*-*-*-*-*-*-*-*-*-*-*-*-*-*-*-*-*-*-*-*-*-*-*-*-*-*-      CURRENT SCHEDULED MEDICATIONS:    Current Facility-Administered Medications:     acetaminophen (TYLENOL) tablet 650 mg, 650 mg, Oral, Q6H PRN, Yamilex Begum MD, 650 mg at 06/27/22 1411    al mag oxide-diphenhydramine-lidocaine viscous (MAGIC MOUTHWASH) suspension 10 mL, 10 mL, Swish & Swallow, Q6H PRN, CHERELLE Worley, 10 mL at 06/25/22 2129    allopurinol (ZYLOPRIM) tablet 300 mg, 300 mg, Oral, Daily, Debi House PA-C, 300 mg at 06/27/22 0904    aluminum-magnesium hydroxide-simethicone (MYLANTA) oral suspension 30 mL, 30 mL, Oral, Q4H PRN, Esha Sarah DO    aspirin chewable tablet 81 mg, 81 mg, Oral, Daily, Jose E Matthew DO, 81 mg at 06/27/22 9817    atorvastatin (LIPITOR) tablet 40 mg, 40 mg, Oral, Daily With Nydia House PA-C, 40 mg at 06/27/22 1641    citalopram (CeleXA) tablet 40 mg, 40 mg, Oral, Daily, Debi House PA-C, 40 mg at 06/27/22 0904    clopidogrel (PLAVIX) tablet 75 mg, 75 mg, Oral, Daily, Debi House PA-C, 75 mg at 06/27/22 0904    heparin (porcine) subcutaneous injection 5,000 Units, 5,000 Units, Subcutaneous, Q8H [facility], Derian Childs MD, 5,000 Units at 06/27/22 1407    HYDROcodone-acetaminophen (NORCO) 5-325 mg per tablet 1 tablet, 1 tablet, Oral, Q6H PRN, Esha Sarah DO, 1 tablet at 06/27/22 0905    insulin glargine (LANTUS) subcutaneous injection 30 Units 0 3 mL, 30 Units, Subcutaneous, Q12H [facility], Esha Sarah DO, 30 Units at 06/27/22 0904    insulin lispro (HumaLOG) 100 units/mL subcutaneous injection 1-6 Units, 1-6 Units, Subcutaneous, 4x Daily (AC & HS), 1 Units at 06/27/22 0026 **AND** Fingerstick Glucose (POCT), , , 4x Daily AC and at bedtime, Debi House PA-C    insulin lispro (HumaLOG) 100 units/mL subcutaneous injection 10 Units, 10 Units, Subcutaneous, TID With Meals, [facility], 10 Units at 06/27/22 1642    isosorbide mononitrate (IMDUR) 24 hr tablet 60 mg, 60 mg, Oral, Daily, Debi House PA-C, 60 mg at 06/27/22 0904    levothyroxine tablet 50 mcg, 50 mcg, Oral, Early Morning, Debi House PA-C, 50 mcg at 06/27/22 0656    naloxone (NARCAN) injection 2 mg, 2 mg, Intravenous, Once, [facility]    OLANZapine (ZyPREXA) tablet 5 mg, 5 mg, Oral, HS, Debi House PA-C, 5 mg at 06/27/22 0027    ondansetron (ZOFRAN) injection 4 mg, 4 mg, Intravenous, Q6H PRN, Debi House PA-C, 4 mg at 06/22/22 1755    senna-docusate sodium (SENOKOT S) 8 6-50 mg per tablet 1 tablet, 1 tablet, Oral, BID, [facility], 1 tablet at 06/27/22 1641    sorbitol 70 % solution 30 mL, 30 mL, Oral, Daily, [facility], 30 mL at 06/27/22 8682 ALLERGIES:  Allergies   Allergen Reactions    Codeine      Other reaction(s): Nausea and Vomiting    Latex Itching        *-*-*-*-*-*-*-*-*-*-*-*-*-*-*-*-*-*-*-*-*-*-*-*-*-*-*-*-*-*-*-*-*-*-*-*-*-*-*-*-*-*-*-*-*-*-*-*-*-*-*-*-*-*  LABORATORY DATA:  I have personally reviewed pertinent labs      CMP:  Results from last 7 days   Lab Units 06/27/22  0606 06/26/22  0644 06/25/22  0552 06/23/22  0500 06/22/22  0637 06/21/22  2249   SODIUM mmol/L 140 137 134*   < > 130* 129*   POTASSIUM mmol/L 4 3 5 0 4 5   < > 4 2 4 2   CHLORIDE mmol/L 107 105 100   < > 97* 96*   CO2 mmol/L 24 24 24   < > 22 24   BUN mg/dL 72* 82* 87*   < > 81* 78*   CREATININE mg/dL 2 66* 3 22* 3 68*   < > 4 37* 4 23*   CALCIUM mg/dL 8 4 8 1* 8 1*   < > 7 9* 7 9*   ALK PHOS U/L  --   --   --   --  100 103   ALT U/L  --   --   --   --  137* 110*   AST U/L  --   --   --   --  110* 92*    < > = values in this interval not displayed  Results from last 7 days   Lab Units 22  0644   MAGNESIUM mg/dL 3 1*     Results from last 7 days   Lab Units 22  0644   PHOSPHORUS mg/dL 5 1*    Cardiac Profile:   Results from last 7 days   Lab Units 22  2249   CK MB ng/mL 16 4*                CBC:   Results from last 7 days   Lab Units 22  0644 22  0552 22  0421   WBC Thousand/uL 10 96* 10 77* 12 79*   HEMOGLOBIN g/dL 7 9* 8 5* 8 1*   HEMATOCRIT % 23 8* 26 0* 24 6*   PLATELETS Thousands/uL 296 298 274     PT/INR: No results found for: PT, INR, Microbiology:          *-*-*-*-*-*-*-*-*-*-*-*-*-*-*-*-*-*-*-*-*-*-*-*-*-*-*-*-*-*-*-*-*-*-*-*-*-*-*-*-*-*-*-*-*-*-*-*-*-*-*-*-*-*-  IMAGING STUDIES REPORTS: Imaging studies results reviewed    No valid procedures specified  XR chest 2 views    Result Date: 2/10/2022  Impression Possible trace left pleural effusion  No consolidation or edema  Workstation performed: [id number]       *-*-*-*-*-*-*-*-*-*-*-*-*-*-*-*-*-*-*-*-*-*-*-*-*-*-*-*-*-*-*-*-*-*-*-*-*-*-*-*-*-*-*-*-*-*-*-*-*-*-*-*-*-*-    Results for orders placed during the hospital encounter of 21    Echo complete with contrast if indicated    Narrative  [facility] [address]    Transthoracic Echocardiogram  2D, M-mode, Doppler, and Color Doppler    Study date:  24-Aug-2021    Patient: Paulino Ojeda  MR number: [id number]  Account number: [de-identified]  : 1960  Age: 64 years  Gender: Female  Status: Inpatient  Location: [place]   Height: 68 in  Weight: 312 4 lb  BP: 149/ 79 mmHg    Indications: Heart failure    Diagnoses: I50 9 - Heart failure, unspecified    Sonographer:  CASANDRA French  Interpreting Physician:  NICOLE Leija    Primary Physician:  Joann Calderon MD  Referring Physician:  CHERELLE Couch  Group:  [facility]    SUMMARY    LEFT VENTRICLE:  Systolic function was mildly reduced by visual assessment  Ejection fraction was estimated to be 45 %  There were regional wall motion abnormalities that suggest coronary artery disease  There was severe hypokinesis of the basal-mid anteroseptal and basal-mid inferoseptal wall(s)  There was moderate hypokinesis of the basal-mid inferior wall(s)  Wall thickness was mildly increased  There was mild concentric hypertrophy  Features were consistent with grade 2 diastolic dysfunction  Doppler parameters were consistent with high ventricular filling pressure  E/E' was > 19    VENTRICULAR SEPTUM:  There was dyssynergic motion  These changes are consistent with LBBB  MITRAL VALVE:  There was mild "progressive" mitral stenosis  Mean gradient of 5 mmHg at 73 bpm  MVA by Doppler continuity equation is 2 15 cm2  TRICUSPID VALVE:  There was mild regurgitation  PULMONIC VALVE:  There was trace regurgitation  PERICARDIUM:  A trace pericardial effusion was identified  HISTORY: PRIOR HISTORY: CAD, CKD Risk factors: hypertension and diabetes  PROCEDURE: The study was performed in the [place] [id number]  This was a routine study  The transthoracic approach was used  The study included complete 2D imaging, M-mode, complete spectral Doppler, and color Doppler  The heart rate was 76  bpm, at the start of the study  Images were obtained from the parasternal, apical, subcostal, and suprasternal notch acoustic windows  Image quality was adequate  LEFT VENTRICLE: Size was normal  Systolic function was mildly reduced by visual assessment  Ejection fraction was estimated to be 45 %  There were regional wall motion abnormalities that suggest coronary artery disease  There was severe hypokinesis of the basal-mid anteroseptal and basal-mid inferoseptal wall(s)  There was moderate hypokinesis of the basal-mid inferior wall(s)  Wall thickness was mildly increased  There was mild concentric hypertrophy    DOPPLER: Features were consistent with grade 2 diastolic dysfunction  Doppler parameters were consistent with high ventricular filling pressure  E/E' was > 19    VENTRICULAR SEPTUM: There was dyssynergic motion  These changes are consistent with LBBB  RIGHT VENTRICLE: The size was normal  Systolic function was normal  Wall thickness was normal     LEFT ATRIUM: Size was within the limits of normal when indexed for body surface area  LA volume index 31 ml/m2  RIGHT ATRIUM: Size was normal     MITRAL VALVE: Valve structure was normal  There was mild thickening  There was normal leaflet separation  There was mildly restricted mobility of the anterior leaflet  DOPPLER: The transmitral velocity was within the normal range  There  was mild "progressive" mitral stenosis  Mean gradient of 5 mmHg at 73 bpm  MVA by Doppler continuity equation is 2 15 cm2  There was no regurgitation  AORTIC VALVE: The valve was trileaflet  Leaflets exhibited normal thickness and normal cuspal separation  DOPPLER: Transaortic velocity was within the normal range  There was no evidence for stenosis  There was no regurgitation  TRICUSPID VALVE: The valve structure was normal  There was normal leaflet separation  DOPPLER: The transtricuspid velocity was within the normal range  There was no evidence for stenosis  There was mild regurgitation  Estimated peak PA  pressure was 34 mmHg  PULMONIC VALVE: Not well visualized  DOPPLER: There was no evidence for stenosis  There was trace regurgitation  PERICARDIUM: A trace pericardial effusion was identified  The pericardium was normal in appearance  AORTA: The root exhibited normal size  SYSTEMIC VEINS: IVC: The inferior vena cava was normal in size and course   Respirophasic changes were normal     SYSTEM MEASUREMENT TABLES    2D  %FS: 25 15 %  Ao Diam: 2 77 cm  EDV(Teich): 136 76 ml  EF(Teich): 49 26 %  ESV(Teich): 69 39 ml  IVSd: 1 07 cm  LA Area: 30 26 cm2  LA Diam: 4 91 cm  LVEDV MOD A4C: 210 21 ml  LVEF MOD A4C: 46 53 %  LVESV MOD A4C: 112 41 ml  LVIDd: 5 32 cm  LVIDs: 3 99 cm  LVLd A4C: 9 67 cm  LVLs A4C: 8 21 cm  LVPWd: 1 1 cm  RA Area: 18 38 cm2  RVIDd: 3 28 cm  SV MOD A4C: 97 8 ml  SV(Teich): 67 38 ml    CW  TR Vmax: 2 78 m/s  TR maxP 99 mmHg    MM  TAPSE: 2 42 cm    PW  E' Sept: 0 06 m/s  E/E' Sept: 19 84  MV A Sant[place]: 1 49 m/s  MV Dec [place]: 9 08 m/s2  MV DecT: 137 82 ms  MV E Santosh: 1 25 m/s  MV E/A Ratio: 0 84  MV PHT: 39 97 ms  MVA By PHT: 5 5 cm2    [employer] Accredited Echocardiography [facility]    Prepared and electronically signed by    NICOLE Flores  Signed 24-Aug-2021 14:54:43    No results found for this or any previous visit  No results found for this or any previous visit  No results found for this or any previous visit         *-*-*-*-*-*-*-*-*-*-*-*-*-*-*-*-*-*-*-*-*-*-*-*-*-*-*-*-*-*-*-*-*-*-*-*-*-*-*-*-*-*-*-*-*-*-*-*-*-*-*-*-*-*-  SIGNATURES:   [unfilled]   Rhianna Dee MD

## 2022-06-27 NOTE — ASSESSMENT & PLAN NOTE
Possibly due to anemia of chronic disease  Stable  - no indication for transfusion at this time  - Iron panel    - Vitamin B12, folate      Recent Labs     06/25/22  0552 06/26/22  0644   HGB 8 5* 7 9*   * 101*   RDW 15 6* 15 9*

## 2022-06-27 NOTE — ASSESSMENT & PLAN NOTE
Wt Readings from Last 3 Encounters:   06/27/22 127 kg (279 lb 8 7 oz)   06/19/22 128 kg (282 lb)   04/13/22 128 kg (282 lb)     History of combined systolic and diastolic congestive heart failure  Repeat echo showing EF of 30% which is decreased from prior echo of 45%  Not in acute exacerbation   - beta-blocker on hold due to bradycardia, diuretics currently on hold by Renal due to ALFRED on superimposed CKD

## 2022-06-27 NOTE — PLAN OF CARE
Problem: MOBILITY - ADULT  Goal: Maintain or return to baseline ADL function  Description: INTERVENTIONS:  -  Assess patient's ability to carry out ADLs; assess patient's baseline for ADL function and identify physical deficits which impact ability to perform ADLs (bathing, care of mouth/teeth, toileting, grooming, dressing, etc )  - Assess/evaluate cause of self-care deficits   - Assess range of motion  - Assess patient's mobility; develop plan if impaired  - Assess patient's need for assistive devices and provide as appropriate  - Encourage maximum independence but intervene and supervise when necessary  - Involve family in performance of ADLs  - Assess for home care needs following discharge   - Consider OT consult to assist with ADL evaluation and planning for discharge  - Provide patient education as appropriate  Outcome: Progressing  Goal: Maintains/Returns to pre admission functional level  Description: INTERVENTIONS:  - Perform BMAT or MOVE assessment daily    - Set and communicate daily mobility goal to care team and patient/family/caregiver  - Collaborate with rehabilitation services on mobility goals if consulted  - Perform Range of Motion  times a day  - Reposition patient every  hours  - Dangle patient  times a day  - Stand patient  times a day  - Ambulate patient  times a day  - Out of bed to chair  times a day   - Out of bed for meals  times a day  - Out of bed for toileting  - Record patient progress and toleration of activity level   Outcome: Progressing     Problem: Nutrition/Hydration-ADULT  Goal: Nutrient/Hydration intake appropriate for improving, restoring or maintaining nutritional needs  Description: Monitor and assess patient's nutrition/hydration status for malnutrition  Collaborate with interdisciplinary team and initiate plan and interventions as ordered  Monitor patient's weight and dietary intake as ordered or per policy   Utilize nutrition screening tool and intervene as necessary  Determine patient's food preferences and provide high-protein, high-caloric foods as appropriate       INTERVENTIONS:  - Monitor oral intake, urinary output, labs, and treatment plans  - Assess nutrition and hydration status and recommend course of action  - Evaluate amount of meals eaten  - Assist patient with eating if necessary   - Allow adequate time for meals  - Recommend/ encourage appropriate diets, oral nutritional supplements, and vitamin/mineral supplements  - Order, calculate, and assess calorie counts as needed  - Recommend, monitor, and adjust tube feedings and TPN/PPN based on assessed needs  - Assess need for intravenous fluids  - Provide specific nutrition/hydration education as appropriate  - Include patient/family/caregiver in decisions related to nutrition  Outcome: Progressing     Problem: Potential for Falls  Goal: Patient will remain free of falls  Description: INTERVENTIONS:  - Educate patient/family on patient safety including physical limitations  - Instruct patient to call for assistance with activity   - Consult OT/PT to assist with strengthening/mobility   - Keep Call bell within reach  - Keep bed low and locked with side rails adjusted as appropriate  - Keep care items and personal belongings within reach  - Initiate and maintain comfort rounds  - Make Fall Risk Sign visible to staff  - Offer Toileting every  Hours, in advance of need  - Initiate/Maintain rm  - Obtain necessary fall risk management equipment:   - Apply yellow socks and bracelet for high fall risk patients  - Consider moving patient to room near nurses station  Outcome: Progressing

## 2022-06-27 NOTE — ASSESSMENT & PLAN NOTE
CAD   Presented to institution for chest pain evaluation  Most recent cardiac catheterization 8/21:  Revealed 100% chronic total occlusion of proximal RCA, 30% stenosis proximal LAD at the site of prior stent, mid LAD stent, distal LAD stent, proximal circumflex stent patent  · 2D echo with decreased ejection fraction of 30%, decreased from 45% last year  New wall motion abnormalities  · Continue Plavix, statin, isosorbide    No beta-blocker due to bradycardia  · For stress test today  · Pending additional Cardiology recommendations

## 2022-06-27 NOTE — ASSESSMENT & PLAN NOTE
Patient has a history of anemia secondary to chronic kidney disease  · On oral iron as an outpatient  · Continue monitor

## 2022-06-27 NOTE — ASSESSMENT & PLAN NOTE
Lab Results   Component Value Date    HGBA1C 7 3 (A) 03/29/2022       Recent Labs     06/26/22  1523 06/26/22  2129 06/27/22  0743 06/27/22  0915   POCGLU 184* 159* 89 128       Blood Sugar Average: Last 72 hrs:  (P) 326 [id number]     Patient has history of diabetes type 2, with long-term use of insulin, with associated chronic kidney disease stage  · Home regimen: Lantus 50U BID and scheduled humalog 20 t i d   Plus Trulicity once a week  · Inpatient regimen:  Lantus 30U BID, Humalog 10 units t i d , sliding scale Payton Gardiner  :  1946  DOS: 2022  MRN: [id number]    Sports Medicine Clinic Visit    PCP: Araseli Frazier    Payton Gardiner is a 75 year old female who is seen as a self referral presenting with right hip pain.    Injury: Fell onto her hip in 2021 into some decreative boulders while hauling wood. Initially was just a little sore but pain has increased. Pain located over right posterolateral hip without radiation. Reports constant pain. Additional Features:  Positive: pain only.  Symptoms are minimally better with heat and ibuprofen.  Symptoms are worse with: sleeping, walking, hip flexion and stairs.  Other evaluation and/or treatments so far consists of: ibuprofen and heating pad.  Recent imaging completed: No recent imaging completed.  Prior History of related problems: yes    Social History: retired    Review of Systems  Musculoskeletal: as above  Remainder of review of systems is negative including constitutional, CV, pulmonary, GI, Skin and Neurologic except as noted in HPI or medical history.    No past medical history on file.  Past Surgical History:   Procedure Laterality Date     CATARACT IOL, RT/LT       COLONOSCOPY  2016    incomplete     COLONOSCOPY  2016     DEXA - HIM SCAN  2015     EXCISE BREAST CYST/FIBROADENOMA/TUMOR/DUCT LESION/NIPPLE LESION/AREOLAR LESION Left 2001    left breast cyst aspiration     HYSTERECTOMY      took ovaries and cervix as well, removed due to cyst (benign)     Family History   Problem Relation Age of Onset     Hypertension Mother      Coronary Artery Disease Mother      Dementia Mother      Parkinsonism Mother      Other - See Comments Mother         lewy body disease     Macular Degeneration Mother      Cataracts Mother      Colon Cancer Father         age 93     Stomach Cancer Father      Breast Cancer Sister      Thyroid Cancer Sister      Brain Tumor Sister      Cataracts Sister      Tumor Sister         non-malignant brain  tumor     Pancreatic Cancer Other      Diabetes No family hx of        Objective  BP (!) 145/74   Wt 90.6 kg (199 lb 12.8 oz)   BMI 35.11 kg/m        General: healthy, alert and in no distress      HEENT: no scleral icterus or conjunctival erythema     Skin: no suspicious lesions or rash. No jaundice.     CV: regular rhythm by palpation, 2+ distal pulses, no pedal edema      Resp: normal respiratory effort without conversational dyspnea     Psych: normal mood and affect      Gait: mildly antalgic, appropriate coordination and balance     Neuro: normal light touch sensory exam of the extremities. Motor strength as noted below     Right hip exam    Inspection:        no edema or ecchymosis in hip area    ROM:       Full active and passive ROM     Strength:        flexion 5/5       extension 5/5       abduction 5/5       adduction 5/5    Tender:        greater trochanter       SI joint       glute med       TFL, mild anterior hip    Non Tender:        remainder of hip area    Sensation:        grossly intact in hip and thigh    Skin:       well perfused       capillary refill brisk    Special Tests:        neg (-) CARMEN       neg (-) FADIR       neg (-) scour       positive (+) Diego    Large Joint Injection/Arthocentesis: R greater trochanteric bursa    Date/Time: 2/11/2022 3:00 PM  Performed by: Luiz Acevedo DO  Authorized by: Luiz Acevedo DO     Indications:  Pain  Needle Size:  25 G  Guidance: ultrasound    Approach:  Lateral  Location:  Hip      Site:  R greater trochanteric bursa  Medications:  3 mL ropivacaine 5 MG/ML; 40 mg triamcinolone 40 MG/ML; 2 mL lidocaine 1 %  Medications comment:  2 mL 0.5% Bupivacaine  NDC: 9216-7082-24  CBU: [id number]  Exp: 03/01/2023    Outcome:  Tolerated well, no immediate complications  Procedure discussed: discussed risks, benefits, and alternatives    Consent Given by:  Patient  Timeout: timeout called immediately prior to procedure    Prep: patient was  prepped and draped in usual sterile fashion          Radiology  Recent Results (from the past 744 hour(s))   XR Pelvis w Hip RT 1 View    Narrative    PELVIS AND HIP RIGHT ONE VIEW  DATE/TIME: 2/11/2022 3:25 PM    INDICATION: Hip pain  COMPARISON: None available.      Impression    IMPRESSION: Anatomic alignment right hip. No acute displaced right hip  fracture. No significant hip joint space narrowing for age. Bilateral  acetabular over coverage/coxa profunda. No acute displaced pelvic  fracture. Both obturator rings intact. Degenerative change lower  lumbar spine.    TAZ MORENO MD         SYSTEM ID:  [id number]       Assessment:  1. Right hip pain    2. Hip injury, right, initial encounter    3. Trochanteric bursitis of right hip        Plan:  Discussed the assessment with the patient.  Follow up: prn based on clinical progress  Subacute right hip pain after injury, worsening lateral hip pain  Clinically consistent with gluteal tendinitis and hip bursitis after contusion  XR images independently visualized and reviewed with patient today in clinic  No significant degenerative change  Reviewed could be an intraarticular component, but less convincing on exam today  Trial of trochanteric bursa CSI today  PT options and low impact activity strategies reviewed  Oral Tylenol and topical Voltaren gel reviewed as safe OTC options, reviewed safe dosing strategies  Expectations and goals of CSI reviewed  Often 2-3 days for steroid effect, and can take up to two weeks for maximum effect  We discussed modified progressive pain-free activity as tolerated  Do not overuse in first two weeks if feeling better due to concern for vulnerability while steroid is working  Supportive care reviewed  All questions were answered today  Contact us with additional questions or concerns  Signs and sx of concern reviewed      Luiz Acevedo DO, RYAN  Sports Medicine Physician  [facility]      Time spent  in chart review, one-on-one evaluation, discussion with patient regarding: nature of problem, clinical course, prior treatments, therapeutic options, shared-decision making, potential procedures and referrals, and charting related to the visit: 32 minutes.  If applicable, time does not include time spent performing any procedure.      Disclaimer: This note consists of symbols derived from keyboarding, dictation and/or voice recognition software. As a result, there may be errors in the script that have gone undetected. Please consider this when interpreting information found in this chart.

## 2022-06-27 NOTE — ASSESSMENT & PLAN NOTE
Patient notes pain over both maxillary sinuses, sensation similar to previous sinus infection  - No focal neurologic signs symptoms or deficits  - CT scan the brain negative for sinusitis

## 2022-06-27 NOTE — PHYSICAL THERAPY NOTE
Physical Therapy Treatment Note    Patient's Name: Angelo Aragon  : 22 1442   PT Last Visit   PT Visit Date 22   Note Type   Note Type Treatment   Pain Assessment   Pain Assessment Tool 0-10   Pain Score 7   Pain Location/Orientation Location: Head   Hospital Pain Intervention(s) Ambulation/increased activity  (RN had administered pain meds prior to PT arrival)   Restrictions/Precautions   Weight Bearing Precautions Per Order No   Other Precautions Fall Risk;Pain   General   Chart Reviewed Yes   Response to Previous Treatment Patient with no complaints from previous session  Family/Caregiver Present No   Subjective   Subjective "I felt weak + a little SOB " (w/ ambulation)   Bed Mobility   Supine to Sit Unable to assess   Sit to Supine Unable to assess   Additional Comments Pt greeted seated EOB  Transfers   Sit to Stand 5  Supervision   Additional items Increased time required;Verbal cues   Stand to Sit 4  Minimal assistance  (CGA)   Additional items Assist x 1; Increased time required;Verbal cues   Stand pivot 4  Minimal assistance  (cga)   Additional items Assist x 1; Increased time required;Verbal cues   Additional Comments RW   Ambulation/Elevation   Gait pattern Improper Weight shift; Excessively slow; Short stride   Gait Assistance 4  Minimal assist   Additional items Assist x 1;Verbal cues; Tactile cues   Assistive Device Rolling walker; Other (Comment)  (rollator)   Distance 27' w/ RW + 80' w/ rollator   Ambulation/Elevation Additional Comments initially S w/ rollator, required MinAx1 w/ increased distance due to fatigue / PADILLA   Balance   Static Sitting Fair +   Dynamic Sitting Fair   Static Standing Fair -   Dynamic Standing Poor +   Ambulatory Poor +  (RW)   Endurance Deficit   Endurance Deficit Yes   Endurance Deficit Description PADILLA, weakness, fatigue   Activity Tolerance   Activity Tolerance Patient limited by fatigue;Treatment limited secondary to medical complications (Comment)  (PADILLA)   Assessment   Prognosis Good   Problem List Decreased strength;Decreased endurance; Impaired balance;Decreased mobility;Obesity;Pain   Assessment Pt seen for PT session w/ focus on gait training  Pt required encouragement to participate w/ PT due to reported headache  Initiated gait training w/ RW in pt's room, pt reported distaste for RW due to noisiness  Switched to rollator  Pt initially S w/ rollator but after ~40' pt noticeably dyspenic, increased unsteadiness w/ fatigue / SOB  SpO2 WNL on RA  Pt subjectively reported feeling weak + SOB  Spoke about d/c disposition when medically stable  At this time would recommend rehab upon d/c due to increased need for assistance from baseline + low functional endurance  Will continue to follow  Barriers to Discharge Inaccessible home environment;Decreased caregiver support  (dtr works 12-hour shift at night, 4-6 DOROTHEA)   Goals   Patient Goals get better   PT Treatment Day 1   Plan   Treatment/Interventions Functional transfer training;LE strengthening/ROM; Elevations; Therapeutic exercise; Endurance training;Patient/family training;Equipment eval/education; Bed mobility;Gait training  (balance training)   Progress Slow progress, decreased activity tolerance   PT Frequency 3-5x/wk   Recommendation   PT Discharge Recommendation (S)  Post acute rehabilitation services   Equipment Recommended (S)    (BSC)   AM-PAC Basic Mobility Inpatient   Turning in Bed Without Bedrails 4   Lying on Back to Sitting on Edge of Flat Bed 3   Moving Bed to Chair 3   Standing Up From Chair 3   Walk in Room 3   Climb 3-5 Stairs 2   Basic Mobility Inpatient Raw Score 18   Basic Mobility Standardized Score 41 05   Highest Level Of Mobility   JH-HLM Goal 6: Walk 10 steps or more   JH-HLM Achieved 7: Walk 25 feet or more   Education   Education Provided Mobility training;Assistive device   Patient Demonstrates acceptance/verbal understanding;Reinforcement needed   End of Consult   Patient Position at End of Consult Seated edge of bed; All needs within reach  (in NAD)     Luiz Ma, PT, DPT

## 2022-06-27 NOTE — ASSESSMENT & PLAN NOTE
Patient was admitted after syncopal episode with associated hypotension  Due to volume depletion  - status post IV fluids  - torsemide on hold

## 2022-06-27 NOTE — PLAN OF CARE
Problem: MOBILITY - ADULT  Goal: Maintain or return to baseline ADL function  Description: INTERVENTIONS:  -  Assess patient's ability to carry out ADLs; assess patient's baseline for ADL function and identify physical deficits which impact ability to perform ADLs (bathing, care of mouth/teeth, toileting, grooming, dressing, etc )  - Assess/evaluate cause of self-care deficits   - Assess range of motion  - Assess patient's mobility; develop plan if impaired  - Assess patient's need for assistive devices and provide as appropriate  - Encourage maximum independence but intervene and supervise when necessary  - Involve family in performance of ADLs  - Assess for home care needs following discharge   - Consider OT consult to assist with ADL evaluation and planning for discharge  - Provide patient education as appropriate  Outcome: Progressing  Goal: Maintains/Returns to pre admission functional level  Description: INTERVENTIONS:  - Perform BMAT or MOVE assessment daily    - Set and communicate daily mobility goal to care team and patient/family/caregiver  - Collaborate with rehabilitation services on mobility goals if consulted  - Perform Range of Motion 3 times a day  - Reposition patient every 2 hours  - Dangle patient 3 times a day  - Stand patient 3 times a day  - Ambulate patient 3 times a day  - Out of bed to chair for meals  - Out of bed for toileting  - Record patient progress and toleration of activity level   Outcome: Progressing     Problem: Nutrition/Hydration-ADULT  Goal: Nutrient/Hydration intake appropriate for improving, restoring or maintaining nutritional needs  Description: Monitor and assess patient's nutrition/hydration status for malnutrition  Collaborate with interdisciplinary team and initiate plan and interventions as ordered  Monitor patient's weight and dietary intake as ordered or per policy  Utilize nutrition screening tool and intervene as necessary   Determine patient's food preferences and provide high-protein, high-caloric foods as appropriate       INTERVENTIONS:  - Monitor oral intake, urinary output, labs, and treatment plans  - Assess nutrition and hydration status and recommend course of action  - Evaluate amount of meals eaten  - Assist patient with eating if necessary   - Allow adequate time for meals  - Recommend/ encourage appropriate diets, oral nutritional supplements, and vitamin/mineral supplements  - Order, calculate, and assess calorie counts as needed  - Recommend, monitor, and adjust tube feedings and TPN/PPN based on assessed needs  - Assess need for intravenous fluids  - Provide specific nutrition/hydration education as appropriate  - Include patient/family/caregiver in decisions related to nutrition  Outcome: Progressing     Problem: Potential for Falls  Goal: Patient will remain free of falls  Description: INTERVENTIONS:  - Educate patient/family on patient safety including physical limitations  - Instruct patient to call for assistance with activity   - Consult OT/PT to assist with strengthening/mobility   - Keep Call bell within reach  - Keep bed low and locked with side rails adjusted as appropriate  - Keep care items and personal belongings within reach  - Initiate and maintain comfort rounds  - Make Fall Risk Sign visible to staff  - Offer Toileting every 2 Hours, in advance of need  - Initiate/Maintain bed/chair alarm  - Obtain necessary fall risk management equipment: alarms, walker  - Apply yellow socks and bracelet for high fall risk patients  - Consider moving patient to room near nurses station  Outcome: Progressing     Problem: INFECTION - ADULT  Goal: Absence or prevention of progression during hospitalization  Description: INTERVENTIONS:  - Assess and monitor for signs and symptoms of infection  - Monitor lab/diagnostic results  - Monitor all insertion sites, i e  indwelling lines, tubes, and drains  - Monitor endotracheal if appropriate and nasal secretions for changes in amount and color  - [place] appropriate cooling/warming therapies per order  - Administer medications as ordered  - Instruct and encourage patient and family to use good hand hygiene technique  - Identify and instruct in appropriate isolation precautions for identified infection/condition  Outcome: Progressing     Problem: GENITOURINARY - ADULT  Goal: Urinary catheter remains patent  Description: INTERVENTIONS:  - Assess patency of urinary catheter  - If patient has a chronic morales, consider changing catheter if non-functioning  - Follow guidelines for intermittent irrigation of non-functioning urinary catheter  Outcome: Progressing     Problem: METABOLIC, FLUID AND ELECTROLYTES - ADULT  Goal: Fluid balance maintained  Description: INTERVENTIONS:  - Monitor labs   - Monitor I/O and WT  - Instruct patient on fluid and nutrition as appropriate  - Assess for signs & symptoms of volume excess or deficit  Outcome: Progressing  Goal: Glucose maintained within target range  Description: INTERVENTIONS:  - Monitor Blood Glucose as ordered  - Assess for signs and symptoms of hyperglycemia and hypoglycemia  - Administer ordered medications to maintain glucose within target range  - Assess nutritional intake and initiate nutrition service referral as needed  Outcome: Progressing     Problem: HEMATOLOGIC - ADULT  Goal: Maintains hematologic stability  Description: INTERVENTIONS  - Assess for signs and symptoms of bleeding or hemorrhage  - Monitor labs  - Administer supportive blood products/factors as ordered and appropriate  Outcome: Progressing     Problem: DISCHARGE PLANNING  Goal: Discharge to home or other facility with appropriate resources  Description: INTERVENTIONS:  - Identify barriers to discharge w/patient and caregiver  - Arrange for needed discharge resources and transportation as appropriate  - Identify discharge learning needs (meds, wound care, etc )  - Arrange for interpretive services to assist at discharge as needed  - Refer to Case Management Department for coordinating discharge planning if the patient needs post-hospital services based on physician/advanced practitioner order or complex needs related to functional status, cognitive ability, or social support system  Outcome: Progressing     Problem: Knowledge Deficit  Goal: Patient/family/caregiver demonstrates understanding of disease process, treatment plan, medications, and discharge instructions  Description: Complete learning assessment and assess knowledge base    Interventions:  - Provide teaching at level of understanding  - Provide teaching via preferred learning methods  Outcome: Progressing

## 2022-06-27 NOTE — PROGRESS NOTES
NEPHROLOGY PROGRESS NOTE   Jaylyn Barrios 63 y o  female MRN: [id number]  Unit/Bed#: E4 -01 Encounter: [id number]      ASSESSMENT & PLAN:  1  Acute kidney injury on top of CKD stage 4, baseline creatinine around 2 5-2 9  Creatinine peak of 4 52 on 04/23, kidney function improving with creatinine down to baseline at 2 6 today  Diuretics were previously held  Follow daily labs  2  Coronary artery disease with multiple PCIs, elevated troponins, syncopal   Cardiology on board, plan for stress test today  Diuretics currently on hold  3  Hemodynamics, blood pressure today elevated, reported patient was admitted with syncope with associated hypotension suspected secondary to volume depletion  Keep holding diuretics for today  Diuretics can be resumed before discharge as long as kidney function and hemodynamics remain stable    4  Urinary retention with chronic suprapubic catheter    5  Anemia, multifactorial in the setting of CKD, monitor H&H      SUBJECTIVE:  Patient seen and examined, complaining of headaches and some mild nausea, denies any chest pain, shortness of breath, no abdominal pain, no vomiting, no diarrhea        OBJECTIVE:  Current Weight: Weight - Scale: 127 kg (279 lb 8 7 oz)  Vitals:    06/27/22 0700   BP: 169/77   Pulse: 73   Resp: 18   Temp: 97 7 °F (36 5 °C)   SpO2: 96%       Intake/Output Summary (Last 24 hours) at 6/27/2022 1121  Last data filed at 6/27/2022 4501  Gross per 24 hour   Intake 480 ml   Output 3000 ml   Net -2520 ml     General: conscious, cooperative, in not acute distress  Eyes: conjunctivae pink, anicteric sclerae  ENT: lips and mucous membranes moist  Neck: supple, no JVD  Chest: clear breath sounds bilateral, no crackles, ronchus or wheezings  CVS: distinct S1 & S2, normal rate, regular rhythm  Abdomen: soft, non-tender, non-distended, normoactive bowel sounds  Extremities: no significant edema of both legs  Skin: no rash  Neuro: awake, alert, oriented  Genitourinary Camargo catheter in place        Medications:    Current Facility-Administered Medications:     acetaminophen (TYLENOL) tablet 650 mg, 650 mg, Oral, Q6H PRN, Kailash Richard MD, 650 mg at 06/26/22 0122    al mag oxide-diphenhydramine-lidocaine viscous (MAGIC MOUTHWASH) suspension 10 mL, 10 mL, Swish & Swallow, Q6H PRN, Evliana Hernán, CRNP, 10 mL at 06/25/22 2129    allopurinol (ZYLOPRIM) tablet 300 mg, 300 mg, Oral, Daily, Debi House PA-C, 300 mg at 06/27/22 0904    aluminum-magnesium hydroxide-simethicone (MYLANTA) oral suspension 30 mL, 30 mL, Oral, Q4H PRN, [facility], DO    aspirin chewable tablet 81 mg, 81 mg, Oral, Daily, Franck Solis DO, 81 mg at 06/27/22 5276    atorvastatin (LIPITOR) tablet 40 mg, 40 mg, Oral, Daily With Ansley House PA-C, 40 mg at 06/26/22 1652    citalopram (CeleXA) tablet 40 mg, 40 mg, Oral, Daily, Debi House PA-C, 40 mg at 06/27/22 0904    clopidogrel (PLAVIX) tablet 75 mg, 75 mg, Oral, Daily, Debi House PA-C, 75 mg at 06/27/22 0904    heparin (porcine) subcutaneous injection 5,000 Units, 5,000 Units, Subcutaneous, Q8H [address], Jose Luis Andrews MD, 5,000 Units at 06/27/22 0656    HYDROcodone-acetaminophen (NORCO) 5-325 mg per tablet 1 tablet, 1 tablet, Oral, Q6H PRN, [facility], DO, 1 tablet at 06/27/22 0905    insulin glargine (LANTUS) subcutaneous injection 30 Units 0 3 mL, 30 Units, Subcutaneous, Q12H [address], [facility], DO, 30 Units at 06/27/22 0904    insulin lispro (HumaLOG) 100 units/mL subcutaneous injection 1-6 Units, 1-6 Units, Subcutaneous, 4x Daily (AC & HS), 1 Units at 06/27/22 0026 **AND** Fingerstick Glucose (POCT), , , 4x Daily AC and at bedtime, Debi House PA-C    insulin lispro (HumaLOG) 100 units/mL subcutaneous injection 10 Units, 10 Units, Subcutaneous, TID With Meals, [facility], , 10 Units at 06/26/22 1654    isosorbide mononitrate (IMDUR) 24 hr tablet 60 mg, 60 mg, Oral, Daily, Debi House PA-C, 60 mg at 06/27/22 7421    levothyroxine tablet 50 mcg, 50 mcg, Oral, Early Morning, Debi House PA-C, 50 mcg at 06/27/22 0656    naloxone [facility]) injection 2 mg, 2 mg, Intravenous, Once, Daryl Mayfield,     OLANZapine (ZyPREXA) tablet 5 mg, 5 mg, Oral, HS, Debi House PA-C, 5 mg at 06/27/22 0027    ondansetron (ZOFRAN) injection 4 mg, 4 mg, Intravenous, Q6H PRN, Debi House PA-C, 4 mg at 06/22/22 1755    senna-docusate sodium (SENOKOT S) 8 6-50 mg per tablet 1 tablet, 1 tablet, Oral, BID, Elizabeth Catching, DO, 1 tablet at 06/27/22 0904    sorbitol 70 % solution 30 mL, 30 mL, Oral, Daily, Elizabeth Catching, DO, 30 mL at 06/27/22 0905    Invasive Devices:        Lab Results:   Results from last 7 days   Lab Units 06/27/22  0606 06/26/22  0644 06/25/22  0552 06/24/22  0421 06/23/22  0500 06/22/22  0637 06/21/22  2249   WBC Thousand/uL  --  10 96* 10 77* 12 79*   < > 12 97* 13 06*   HEMOGLOBIN g/dL  --  7 9* 8 5* 8 1*   < > 8 1* 8 4*   HEMATOCRIT %  --  23 8* 26 0* 24 6*   < > 24 8* 26 3*   PLATELETS Thousands/uL  --  296 298 274   < > 265 276   SODIUM mmol/L 140 137 134* 128*   < > 130* 129*   POTASSIUM mmol/L 4 3 5 0 4 5 4 1   < > 4 2 4 2   CHLORIDE mmol/L 107 105 100 97*   < > 97* 96*   CO2 mmol/L 24 24 24 22   < > 22 24   BUN mg/dL 72* 82* 87* 86*   < > 81* 78*   CREATININE mg/dL 2 66* 3 22* 3 68* 4 18*   < > 4 37* 4 23*   CALCIUM mg/dL 8 4 8 1* 8 1* 7 5*   < > 7 9* 7 9*   MAGNESIUM mg/dL  --  3 1*  --   --   --   --   --    PHOSPHORUS mg/dL  --  5 1*  --   --   --   --   --    ALK PHOS U/L  --   --   --   --   --  100 103   ALT U/L  --   --   --   --   --  137* 110*   AST U/L  --   --   --   --   --  110* 92*    < > = values in this interval not displayed  Previous work up:  See previous notes      Portions of the record may have been created with voice recognition software   Occasional wrong word or "sound a like" substitutions may have occurred due to the inherent limitations of voice recognition software  Read the chart carefully and recognize, using context, where substitutions have occurred  If you have any questions, please contact the dictating provider

## 2022-06-27 NOTE — PROGRESS NOTES
[facility]  Progress Note - Delsie Net 1960, 63 y o  female MRN: [id number]  Unit/Bed#: E4 -01 Encounter: [id number]  Primary Care Provider: CHERELLE Galarza   Date and time admitted to hospital: 6/21/2022 10:21 PM    * CAD (coronary artery disease)  Assessment & Plan  CAD  Presented to institution for chest pain evaluation  Most recent cardiac catheterization 8/21:  Revealed 100% chronic total occlusion of proximal RCA, 30% stenosis proximal LAD at the site of prior stent, mid LAD stent, distal LAD stent, proximal circumflex stent patent  · 2D echo with decreased ejection fraction of 30%, decreased from 45% last year  New wall motion abnormalities  · Continue Plavix, statin, isosorbide  No beta-blocker due to bradycardia  · For stress test today  · Pending additional Cardiology recommendations    Elevated troponin I level  Assessment & Plan  For stress test, then possible intervention depending on stress test findings  Syncope  Assessment & Plan  Patient was admitted after syncopal episode with associated hypotension  Due to volume depletion  - status post IV fluids  - torsemide on hold  Headache  Assessment & Plan  Patient notes pain over both maxillary sinuses, sensation similar to previous sinus infection  - No focal neurologic signs symptoms or deficits  - CT scan the brain negative for sinusitis    Bradycardia  Assessment & Plan  Sinus bradycardia with bundle branch block  - Cardiology recommendations appreciated  - beta-blocker currently on hold    History of anemia due to chronic kidney disease  Assessment & Plan  Patient has a history of anemia secondary to chronic kidney disease  · On oral iron as an outpatient  · Continue monitor    Hyponatremia  Assessment & Plan  Hypovolemic hyponatremia  Resolved with IV fluids  Etiology likely secondary to GI losses      Recent Labs     06/25/22  0552 06/26/22  0644 06/27/22  0606   SODIUM 134* 137 140             Gastroenteritis  Assessment & Plan  Likely secondary to viral gastroenteritis  Resolved    Hypotension  Assessment & Plan  Due to hypovolemia  Resolved  Acute renal failure superimposed on chronic kidney disease ([address] )  Assessment & Plan  Acute kidney injury on superimposed chronic kidney disease stage 4  Baseline creatinine is 2 5- 2 9  Etiology:  Possibly due to GI losses/volume depletion/ATN  Resolved  Back to baseline   - appreciate renal recommendations  - diuretics to be restarted by renal when indicated    Chronic combined systolic and diastolic CHF (congestive heart failure) ([facility])  Assessment & Plan  Wt Readings from Last 3 Encounters:   06/27/22 127 kg (279 lb 8 7 oz)   06/19/22 128 kg (282 lb)   04/13/22 128 kg (282 lb)     History of combined systolic and diastolic congestive heart failure  Repeat echo showing EF of 30% which is decreased from prior echo of 45%  Not in acute exacerbation   - beta-blocker on hold due to bradycardia, diuretics currently on hold by Renal due to ALFRED on superimposed CKD  Anemia  Assessment & Plan  Possibly due to anemia of chronic disease  Stable  - no indication for transfusion at this time  - Iron panel    - Vitamin B12, folate  Recent Labs     06/25/22  0552 06/26/22  0644   HGB 8 5* 7 9*   * 101*   RDW 15 6* 15 9*         Type 2 diabetes mellitus with stage 4 chronic kidney disease, with long-term current use of insulin [facility])  Assessment & Plan  Lab Results   Component Value Date    HGBA1C 7 3 (A) 03/29/2022       Recent Labs     06/26/22  1523 06/26/22  2129 06/27/22  0743 06/27/22  0915   POCGLU 184* 159* 89 128       Blood Sugar Average: Last 72 hrs:  (P) 423 [id number]     Patient has history of diabetes type 2, with long-term use of insulin, with associated chronic kidney disease stage  · Home regimen: Lantus 50U BID and scheduled humalog 20 t i d   Plus Trulicity once a week  · Inpatient regimen:  Lantus 30U BID, Humalog 10 units t i d , sliding scale      VTE Pharmacologic Prophylaxis:   Pharmacologic: Heparin  Mechanical VTE Prophylaxis in Place: Yes    Discussions with Specialists or Other Care Team Provider: nursing    Education and Discussions with Family / Patient: patient    Time Spent for Care: 30 minutes  More than 50% of total time spent on counseling and coordination of care as described above  Current Length of Stay: 5 day(s)    Current Patient Status: Inpatient   Certification Statement: The patient will continue to require additional inpatient hospital stay due to stress test, cards and renal reeval    Discharge Plan: active    Code Status: Level 1 - Full Code      Subjective:   Patient seen examined at bedside  Denies any chest pain or shortness of breath  Objective:     Vitals:   Temp (24hrs), Av 8 °F (36 6 °C), Min:97 1 °F (36 2 °C), Max:98 7 °F (37 1 °C)    Temp:  [97 1 °F (36 2 °C)-98 7 °F (37 1 °C)] 97 7 °F (36 5 °C)  HR:  [68-75] 73  Resp:  [18] 18  BP: (134-170)/(69-79) 169/77  SpO2:  [92 %-96 %] 96 %  Body mass index is 42 5 kg/m²  Input and Output Summary (last 24 hours): Intake/Output Summary (Last 24 hours) at 2022 1047  Last data filed at 2022 0905  Gross per 24 hour   Intake 480 ml   Output 3000 ml   Net -2520 ml       Physical Exam:     Physical Exam  Vitals reviewed  Constitutional:       General: She is not in acute distress  HENT:      Head: Normocephalic  Nose: Nose normal       Mouth/Throat:      Mouth: Mucous membranes are moist    Eyes:      General: No scleral icterus  Cardiovascular:      Rate and Rhythm: Normal rate  Pulmonary:      Effort: Pulmonary effort is normal  No respiratory distress  Abdominal:      General: There is no distension  Palpations: Abdomen is soft  Tenderness: There is no abdominal tenderness  Skin:     General: Skin is warm  Neurological:      Mental Status: She is alert  Mental status is at baseline  Psychiatric:         Mood and Affect: Mood normal          Behavior: Behavior normal        Additional Data:     Labs:    Results from last 7 days   Lab Units 06/26/22  0644   WBC Thousand/uL 10 96*   HEMOGLOBIN g/dL 7 9*   HEMATOCRIT % 23 8*   PLATELETS Thousands/uL 296   NEUTROS PCT % 69   LYMPHS PCT % 18   MONOS PCT % 9   EOS PCT % 3     Results from last 7 days   Lab Units 06/27/22  0606 06/23/22  0500 06/22/22  0637   SODIUM mmol/L 140   < > 130*   POTASSIUM mmol/L 4 3   < > 4 2   CHLORIDE mmol/L 107   < > 97*   CO2 mmol/L 24   < > 22   BUN mg/dL 72*   < > 81*   CREATININE mg/dL 2 66*   < > 4 37*   ANION GAP mmol/L 9   < > 11   CALCIUM mg/dL 8 4   < > 7 9*   ALBUMIN g/dL  --   --  2 7*   TOTAL BILIRUBIN mg/dL  --   --  0 22   ALK PHOS U/L  --   --  100   ALT U/L  --   --  137*   AST U/L  --   --  110*   GLUCOSE RANDOM mg/dL 97   < > 48*    < > = values in this interval not displayed  Results from last 7 days   Lab Units 06/22/22  0027   INR  1 21*     Results from last 7 days   Lab Units 06/27/22  0915 06/27/22  0743 06/26/22  2129 06/26/22  1523 06/26/22  1129 06/26/22  0739 06/25/22  2130 06/25/22  1551 06/25/22  1132 06/25/22  0734 06/24/22  2125 06/24/22  1550   POC GLUCOSE mg/dl 128 89 159* 184* 206* 137 280* 239* 255* 245* 317* 264*                   * I Have Reviewed All Lab Data Listed Above  * Additional Pertinent Lab Tests Reviewed:  [facility]    Imaging:    Imaging Reports Reviewed Today Include: xr chest, ct head, us kidney and bladder    Recent Cultures (last 7 days):           Last 24 Hours Medication List:   Current Facility-Administered Medications   Medication Dose Route Frequency Provider Last Rate    acetaminophen  650 mg Oral Q6H PRN Lauryn Rocha MD      al mag oxide-diphenhydramine-lidocaine viscous  10 mL Swish & Swallow Q6H PRN CHERELLE Rangel      allopurinol  300 mg Oral Daily Debi Wilson PA-C      aluminum-magnesium hydroxide-simethicone  30 mL Oral Q4H PRN Dayron Hermosilloer, DO      aspirin  81 mg Oral Daily Ami Lima DO      atorvastatin  40 mg Oral Daily With SETH Sy      citalopram  40 mg Oral Daily Debi House PA-C      clopidogrel  75 mg Oral Daily Debi House PA-C      heparin (porcine)  5,000 Units Subcutaneous [facility] Garry Marks MD      HYDROcodone-acetaminophen  1 tablet Oral Q6H PRN Dayron Hermosilloer, DO      insulin glargine  30 Units Subcutaneous Q12H [facility], DO      insulin lispro  1-6 Units Subcutaneous 4x Daily (AC & HS) Debi House PA-C      insulin lispro  10 Units Subcutaneous TID With Meals Dayron Corrigan DO      isosorbide mononitrate  60 mg Oral Daily Debi House PA-C      levothyroxine  50 mcg Oral Early Morning Debi House PA-C      naloxone  2 mg Intravenous Once Dayron Corrigan,       OLANZapine  5 mg Oral HS Debi House PA-C      ondansetron  4 mg Intravenous Q6H PRN Adriana Patton PA-C      senna-docusate sodium  1 tablet Oral BID Dayron Corrigan, DO      sorbitol  30 mL Oral Daily Dayron Corrigan,           Today, Patient Was Seen By: Malolry Pandey MD    ** Please Note: Dictation voice to text software may have been used in the creation of this document   **

## 2022-06-27 NOTE — ASSESSMENT & PLAN NOTE
Acute kidney injury on superimposed chronic kidney disease stage 4  Baseline creatinine is 2 5- 2 9  Etiology:  Possibly due to GI losses/volume depletion/ATN  Resolved    Back to baseline   - appreciate renal recommendations  - diuretics to be restarted by renal when indicated

## 2022-06-27 NOTE — PLAN OF CARE
Problem: PHYSICAL THERAPY ADULT  Goal: Performs mobility at highest level of function for planned discharge setting  See evaluation for individualized goals  Description: Treatment/Interventions: Functional transfer training, LE strengthening/ROM, Elevations, Therapeutic exercise, Endurance training, Patient/family training, Equipment eval/education, Bed mobility, Gait training, Compensatory technique education, Spoke to nursing  Equipment Recommended:  (pt owns rollator)       See flowsheet documentation for full assessment, interventions and recommendations  Outcome: Progressing  Note: Prognosis: Good  Problem List: Decreased strength, Decreased endurance, Impaired balance, Decreased mobility, Obesity, Pain  Assessment: Pt seen for PT session w/ focus on gait training  Pt required encouragement to participate w/ PT due to reported headache  Initiated gait training w/ RW in pt's room, pt reported distaste for RW due to noisiness  Switched to rollator  Pt initially S w/ rollator but after ~40' pt noticeably dyspenic, increased unsteadiness w/ fatigue / SOB  SpO2 WNL on RA  Pt subjectively reported feeling weak + SOB  Spoke about d/c disposition when medically stable  At this time would recommend rehab upon d/c due to increased need for assistance from baseline + low functional endurance  Will continue to follow  Barriers to Discharge: Inaccessible home environment, Decreased caregiver support (dtr works 12-hour shift at night, 4-6 DOROTHEA)  Barriers to Discharge Comments: 4-6 DOROTHEA     PT Discharge Recommendation: (S) Post acute rehabilitation services          See flowsheet documentation for full assessment

## 2022-06-27 NOTE — ASSESSMENT & PLAN NOTE
Sinus bradycardia with bundle branch block  - Cardiology recommendations appreciated  - beta-blocker currently on hold

## 2022-06-28 ENCOUNTER — APPOINTMENT (INPATIENT)
Dept: NON INVASIVE DIAGNOSTICS | Facility: HOSPITAL | Age: 62
DRG: 247 | End: 2022-06-28
Payer: COMMERCIAL

## 2022-06-28 ENCOUNTER — APPOINTMENT (INPATIENT)
Dept: NUCLEAR MEDICINE | Facility: HOSPITAL | Age: 62
DRG: 247 | End: 2022-06-28
Payer: COMMERCIAL

## 2022-06-28 LAB
ANION GAP SERPL CALCULATED.3IONS-SCNC: 9 MMOL/L (ref 4–13)
BASOPHILS # BLD AUTO: 0.06 THOUSANDS/ΜL (ref 0–0.1)
BASOPHILS NFR BLD AUTO: 1 % (ref 0–1)
BUN SERPL-MCNC: 63 MG/DL (ref 5–25)
CALCIUM SERPL-MCNC: 8.2 MG/DL (ref 8.3–10.1)
CHLORIDE SERPL-SCNC: 106 MMOL/L (ref 100–108)
CO2 SERPL-SCNC: 24 MMOL/L (ref 21–32)
CREAT SERPL-MCNC: 2.15 MG/DL (ref 0.6–1.3)
EOSINOPHIL # BLD AUTO: 0.29 THOUSAND/ΜL (ref 0–0.61)
EOSINOPHIL NFR BLD AUTO: 3 % (ref 0–6)
ERYTHROCYTE [DISTWIDTH] IN BLOOD BY AUTOMATED COUNT: 15.9 % (ref 11.6–15.1)
GFR SERPL CREATININE-BSD FRML MDRD: 24 ML/MIN/1.73SQ M
GLUCOSE SERPL-MCNC: 121 MG/DL (ref 65–140)
GLUCOSE SERPL-MCNC: 146 MG/DL (ref 65–140)
GLUCOSE SERPL-MCNC: 148 MG/DL (ref 65–140)
GLUCOSE SERPL-MCNC: 165 MG/DL (ref 65–140)
GLUCOSE SERPL-MCNC: 165 MG/DL (ref 65–140)
HCT VFR BLD AUTO: 25.5 % (ref 34.8–46.1)
HGB BLD-MCNC: 8.1 G/DL (ref 11.5–15.4)
IMM GRANULOCYTES # BLD AUTO: 0.11 THOUSAND/UL (ref 0–0.2)
IMM GRANULOCYTES NFR BLD AUTO: 1 % (ref 0–2)
LYMPHOCYTES # BLD AUTO: 1.95 THOUSANDS/ΜL (ref 0.6–4.47)
LYMPHOCYTES NFR BLD AUTO: 20 % (ref 14–44)
MAGNESIUM SERPL-MCNC: 2.6 MG/DL (ref 1.6–2.6)
MCH RBC QN AUTO: 32 PG (ref 26.8–34.3)
MCHC RBC AUTO-ENTMCNC: 31.8 G/DL (ref 31.4–37.4)
MCV RBC AUTO: 101 FL (ref 82–98)
MONOCYTES # BLD AUTO: 1 THOUSAND/ΜL (ref 0.17–1.22)
MONOCYTES NFR BLD AUTO: 10 % (ref 4–12)
NEUTROPHILS # BLD AUTO: 6.42 THOUSANDS/ΜL (ref 1.85–7.62)
NEUTS SEG NFR BLD AUTO: 65 % (ref 43–75)
NRBC BLD AUTO-RTO: 0 /100 WBCS
NUC STRESS EJECTION FRACTION: 32 %
PLATELET # BLD AUTO: 319 THOUSANDS/UL (ref 149–390)
PMV BLD AUTO: 9.5 FL (ref 8.9–12.7)
POTASSIUM SERPL-SCNC: 4.2 MMOL/L (ref 3.5–5.3)
RATE PRESSURE PRODUCT: NORMAL
RBC # BLD AUTO: 2.53 MILLION/UL (ref 3.81–5.12)
SL CV REST NUCLEAR ISOTOPE DOSE: 15.9 MCI
SL CV STRESS NUCLEAR ISOTOPE DOSE: 48.7 MCI
SL CV STRESS RECOVERY BP: NORMAL MMHG
SL CV STRESS RECOVERY HR: 79 BPM
SL CV STRESS RECOVERY O2 SAT: 96 %
SODIUM SERPL-SCNC: 139 MMOL/L (ref 136–145)
STRESS ANGINA INDEX: 0
STRESS BASELINE BP: NORMAL MMHG
STRESS BASELINE HR: 75 BPM
STRESS O2 SAT REST: 98 %
STRESS PEAK HR: 82 BPM
STRESS POST O2 SAT PEAK: 99 %
STRESS POST PEAK BP: 127 MMHG
STRESS ST DEPRESSION: 0 MM
STRESS ST ELEVATION: 0 MM
WBC # BLD AUTO: 9.83 THOUSAND/UL (ref 4.31–10.16)

## 2022-06-28 PROCEDURE — 80048 BASIC METABOLIC PNL TOTAL CA: CPT | Performed by: STUDENT IN AN ORGANIZED HEALTH CARE EDUCATION/TRAINING PROGRAM

## 2022-06-28 PROCEDURE — G1004 CDSM NDSC: HCPCS

## 2022-06-28 PROCEDURE — 82747 ASSAY OF FOLIC ACID RBC: CPT | Performed by: STUDENT IN AN ORGANIZED HEALTH CARE EDUCATION/TRAINING PROGRAM

## 2022-06-28 PROCEDURE — 99232 SBSQ HOSP IP/OBS MODERATE 35: CPT | Performed by: STUDENT IN AN ORGANIZED HEALTH CARE EDUCATION/TRAINING PROGRAM

## 2022-06-28 PROCEDURE — 99232 SBSQ HOSP IP/OBS MODERATE 35: CPT | Performed by: INTERNAL MEDICINE

## 2022-06-28 PROCEDURE — 93016 CV STRESS TEST SUPVJ ONLY: CPT | Performed by: INTERNAL MEDICINE

## 2022-06-28 PROCEDURE — 82948 REAGENT STRIP/BLOOD GLUCOSE: CPT

## 2022-06-28 PROCEDURE — A9502 TC99M TETROFOSMIN: HCPCS

## 2022-06-28 PROCEDURE — 93018 CV STRESS TEST I&R ONLY: CPT | Performed by: INTERNAL MEDICINE

## 2022-06-28 PROCEDURE — 83735 ASSAY OF MAGNESIUM: CPT | Performed by: STUDENT IN AN ORGANIZED HEALTH CARE EDUCATION/TRAINING PROGRAM

## 2022-06-28 PROCEDURE — 93017 CV STRESS TEST TRACING ONLY: CPT

## 2022-06-28 PROCEDURE — 85025 COMPLETE CBC W/AUTO DIFF WBC: CPT | Performed by: STUDENT IN AN ORGANIZED HEALTH CARE EDUCATION/TRAINING PROGRAM

## 2022-06-28 PROCEDURE — 78452 HT MUSCLE IMAGE SPECT MULT: CPT

## 2022-06-28 PROCEDURE — 78452 HT MUSCLE IMAGE SPECT MULT: CPT | Performed by: INTERNAL MEDICINE

## 2022-06-28 RX ORDER — BUTALBITAL, ACETAMINOPHEN AND CAFFEINE 50; 325; 40 MG/1; MG/1; MG/1
1 TABLET ORAL EVERY 4 HOURS PRN
Status: DISCONTINUED | OUTPATIENT
Start: 2022-06-28 | End: 2022-07-02 | Stop reason: HOSPADM

## 2022-06-28 RX ADMIN — DOCUSATE SODIUM 50MG AND SENNOSIDES 8.6MG 1 TABLET: 8.6; 5 TABLET, FILM COATED ORAL at 17:33

## 2022-06-28 RX ADMIN — ISOSORBIDE MONONITRATE 60 MG: 60 TABLET, EXTENDED RELEASE ORAL at 08:36

## 2022-06-28 RX ADMIN — CITALOPRAM HYDROBROMIDE 40 MG: 20 TABLET ORAL at 08:36

## 2022-06-28 RX ADMIN — HYDROCODONE BITARTRATE AND ACETAMINOPHEN 1 TABLET: 5; 325 TABLET ORAL at 23:40

## 2022-06-28 RX ADMIN — INSULIN GLARGINE 30 UNITS: 100 INJECTION, SOLUTION SUBCUTANEOUS at 21:38

## 2022-06-28 RX ADMIN — INSULIN LISPRO 10 UNITS: 100 INJECTION, SOLUTION INTRAVENOUS; SUBCUTANEOUS at 08:38

## 2022-06-28 RX ADMIN — OLANZAPINE 5 MG: 5 TABLET, FILM COATED ORAL at 21:38

## 2022-06-28 RX ADMIN — HEPARIN SODIUM 5000 UNITS: 5000 INJECTION INTRAVENOUS; SUBCUTANEOUS at 13:36

## 2022-06-28 RX ADMIN — INSULIN LISPRO 10 UNITS: 100 INJECTION, SOLUTION INTRAVENOUS; SUBCUTANEOUS at 17:33

## 2022-06-28 RX ADMIN — DOCUSATE SODIUM 50MG AND SENNOSIDES 8.6MG 1 TABLET: 8.6; 5 TABLET, FILM COATED ORAL at 08:36

## 2022-06-28 RX ADMIN — INSULIN LISPRO 10 UNITS: 100 INJECTION, SOLUTION INTRAVENOUS; SUBCUTANEOUS at 13:40

## 2022-06-28 RX ADMIN — LEVOTHYROXINE SODIUM 50 MCG: 50 TABLET ORAL at 05:24

## 2022-06-28 RX ADMIN — AMLODIPINE BESYLATE 10 MG: 10 TABLET ORAL at 08:36

## 2022-06-28 RX ADMIN — HYDROCODONE BITARTRATE AND ACETAMINOPHEN 1 TABLET: 5; 325 TABLET ORAL at 09:03

## 2022-06-28 RX ADMIN — CLOPIDOGREL BISULFATE 75 MG: 75 TABLET ORAL at 08:37

## 2022-06-28 RX ADMIN — HYDROCODONE BITARTRATE AND ACETAMINOPHEN 1 TABLET: 5; 325 TABLET ORAL at 17:33

## 2022-06-28 RX ADMIN — ASPIRIN 81 MG CHEWABLE TABLET 81 MG: 81 TABLET CHEWABLE at 08:36

## 2022-06-28 RX ADMIN — INSULIN LISPRO 1 UNITS: 100 INJECTION, SOLUTION INTRAVENOUS; SUBCUTANEOUS at 21:38

## 2022-06-28 RX ADMIN — ALLOPURINOL 300 MG: 300 TABLET ORAL at 08:36

## 2022-06-28 RX ADMIN — REGADENOSON 0.4 MG: 0.08 INJECTION, SOLUTION INTRAVENOUS at 11:49

## 2022-06-28 RX ADMIN — INSULIN GLARGINE 30 UNITS: 100 INJECTION, SOLUTION SUBCUTANEOUS at 08:36

## 2022-06-28 RX ADMIN — INSULIN LISPRO 1 UNITS: 100 INJECTION, SOLUTION INTRAVENOUS; SUBCUTANEOUS at 08:37

## 2022-06-28 RX ADMIN — HEPARIN SODIUM 5000 UNITS: 5000 INJECTION INTRAVENOUS; SUBCUTANEOUS at 21:38

## 2022-06-28 RX ADMIN — SORBITOL SOLUTION (BULK) 30 ML: 70 SOLUTION at 08:36

## 2022-06-28 RX ADMIN — ATORVASTATIN CALCIUM 40 MG: 40 TABLET, FILM COATED ORAL at 17:33

## 2022-06-28 RX ADMIN — ONDANSETRON 4 MG: 2 INJECTION INTRAMUSCULAR; INTRAVENOUS at 08:42

## 2022-06-28 RX ADMIN — ACETAMINOPHEN 325MG 650 MG: 325 TABLET ORAL at 08:36

## 2022-06-28 RX ADMIN — HEPARIN SODIUM 5000 UNITS: 5000 INJECTION INTRAVENOUS; SUBCUTANEOUS at 05:24

## 2022-06-28 NOTE — ASSESSMENT & PLAN NOTE
Patient notes pain over both maxillary sinuses, sensation similar to previous sinus infection  - No focal neurologic signs symptoms or deficits  - CT scan the brain negative for sinusitis  - Start fioricet for migraine

## 2022-06-28 NOTE — ASSESSMENT & PLAN NOTE
Acute kidney injury on superimposed chronic kidney disease stage 4  Baseline creatinine is 2 5- 2 9  Etiology:  Possibly due to GI losses/volume depletion/ATN  Resolved    Back to baseline   - appreciate renal recommendations  - diuretics to be restarted by renal when indicated    Recent Labs     06/26/22  0644 06/27/22  0606 06/28/22  0609   BUN 82* 72* 63*   CREATININE 3 22* 2 66* 2 15*   EGFR 15 18 24

## 2022-06-28 NOTE — PROGRESS NOTES
[facility]  Progress Note - Maryana Manrique 1960, 63 y o  female MRN: [id number]  Unit/Bed#: E4 -01 Encounter: [id number]  Primary Care Provider: CHERELLE Parikh   Date and time admitted to hospital: 6/21/2022 10:21 PM    * CAD (coronary artery disease)  Assessment & Plan  CAD  Presented to institution for chest pain evaluation  Most recent cardiac catheterization 8/21:  Revealed 100% chronic total occlusion of proximal RCA, 30% stenosis proximal LAD at the site of prior stent, mid LAD stent, distal LAD stent, proximal circumflex stent patent  · 2D echo with decreased ejection fraction of 30%, decreased from 45% last year  New wall motion abnormalities  · Continue Plavix, statin, isosorbide  No beta-blocker due to bradycardia    Stress test showing abnormal myocardial perfusion scan  Cardiology planning a repeat echocardiogram ayana  For possible cardiac cath with nephrology clearance  Elevated troponin I level  Assessment & Plan  For repeat echo    Syncope  Assessment & Plan  Patient was admitted after syncopal episode with associated hypotension  Due to volume depletion  - status post IV fluids   - Diuretics still on hold    Headache  Assessment & Plan  Patient notes pain over both maxillary sinuses, sensation similar to previous sinus infection  - No focal neurologic signs symptoms or deficits  - CT scan the brain negative for sinusitis  - Start fioricet for migraine    Bradycardia  Assessment & Plan  Sinus bradycardia with bundle branch block  - Cardiology recommendations appreciated  - beta-blocker currently on hold    History of anemia due to chronic kidney disease  Assessment & Plan  Patient has a history of anemia secondary to chronic kidney disease  · On oral iron as an outpatient  · Continue monitor    Hyponatremia  Assessment & Plan  Hypovolemic hyponatremia  Resolved with IV fluids  Etiology likely secondary to GI losses      Recent Labs 06/26/22  0644 06/27/22  0606 06/28/22  0609   SODIUM 137 140 139             Gastroenteritis  Assessment & Plan  Likely secondary to viral gastroenteritis  Resolved    Hypotension  Assessment & Plan  Due to hypovolemia  Resolved  Acute renal failure superimposed on chronic kidney disease ([facility] [address] )  Assessment & Plan  Acute kidney injury on superimposed chronic kidney disease stage 4  Baseline creatinine is 2 5- 2 9  Etiology:  Possibly due to GI losses/volume depletion/ATN  Resolved  Back to baseline   - appreciate renal recommendations  - diuretics to be restarted by renal when indicated    Recent Labs     06/26/22  0644 06/27/22  0606 06/28/22  0609   BUN 82* 72* 63*   CREATININE 3 22* 2 66* 2 15*   EGFR 15 18 24               Chronic combined systolic and diastolic CHF (congestive heart failure) ([facility])  Assessment & Plan  Wt Readings from Last 3 Encounters:   06/28/22 127 kg (280 lb 3 2 oz)   06/19/22 128 kg (282 lb)   04/13/22 128 kg (282 lb)     History of combined systolic and diastolic congestive heart failure  Repeat echo showing EF of 30% which is decreased from prior echo of 45%  Not in acute exacerbation   - beta-blocker on hold due to bradycardia, diuretics currently on hold by Renal due to ALFRED on superimposed CKD  Anemia  Assessment & Plan  Possibly due to anemia of chronic disease  Stable  - no indication for transfusion at this time      Recent Labs     06/26/22  0644 06/27/22  0606 06/28/22  0609   HGB 7 9*  --  8 1*   *  --  101*   RDW 15 9*  --  15 9*   IRON  --  35*  --    TIBC  --  212*  --    FERRITIN  --  80  --          Type 2 diabetes mellitus with stage 4 chronic kidney disease, with long-term current use of insulin [facility])  Assessment & Plan  Lab Results   Component Value Date    HGBA1C 7 3 (A) 03/29/2022       Recent Labs     06/27/22  2251 06/28/22  0746 06/28/22  1337 06/28/22  1620   POCGLU 172* 165* 121 148*       Blood Sugar Average: Last 72 hrs:  (P) 435 1652 Patient has history of diabetes type 2, with long-term use of insulin, with associated chronic kidney disease stage  · Home regimen: Lantus 50U BID and scheduled humalog 20 t i d  Plus Trulicity once a week  · Inpatient regimen:  Lantus 30U BID, Humalog 10 units t i d , sliding scale      VTE Pharmacologic Prophylaxis:   Pharmacologic: Heparin  Mechanical VTE Prophylaxis in Place: Yes    Discussions with Specialists or Other Care Team Provider: nursing    Education and Discussions with Family / Patient: patient    Time Spent for Care: 30 minutes  More than 50% of total time spent on counseling and coordination of care as described above  Current Length of Stay: 6 day(s)    Current Patient Status: Inpatient   Certification Statement: The patient will continue to require additional inpatient hospital stay due to cards reeval, repeat echo    Discharge Plan: active    Code Status: Level 1 - Full Code      Subjective:   Patient seen and examined at bedside  Complaining of headache  Objective:     Vitals:   Temp (24hrs), Av 5 °F (36 4 °C), Min:97 4 °F (36 3 °C), Max:97 6 °F (36 4 °C)    Temp:  [97 4 °F (36 3 °C)-97 6 °F (36 4 °C)] 97 4 °F (36 3 °C)  HR:  [73-80] 80  Resp:  [16-18] 18  BP: (138-141)/(73-75) 138/73  SpO2:  [96 %-97 %] 97 %  Body mass index is 42 6 kg/m²  Input and Output Summary (last 24 hours): Intake/Output Summary (Last 24 hours) at 2022 1834  Last data filed at 2022 0600  Gross per 24 hour   Intake --   Output 1850 ml   Net -1850 ml       Physical Exam:     Physical Exam  Vitals reviewed  Constitutional:       General: She is not in acute distress  HENT:      Head: Normocephalic  Nose: Nose normal       Mouth/Throat:      Mouth: Mucous membranes are moist    Eyes:      General: No scleral icterus  Cardiovascular:      Rate and Rhythm: Normal rate  Pulmonary:      Effort: Pulmonary effort is normal  No respiratory distress     Abdominal:      Palpations: Abdomen is soft  Tenderness: There is no abdominal tenderness  Skin:     General: Skin is warm  Neurological:      Mental Status: She is alert  Mental status is at baseline  Psychiatric:         Mood and Affect: Mood normal          Behavior: Behavior normal        Additional Data:     Labs:    Results from last 7 days   Lab Units 06/28/22  0609   WBC Thousand/uL 9 83   HEMOGLOBIN g/dL 8 1*   HEMATOCRIT % 25 5*   PLATELETS Thousands/uL 319   NEUTROS PCT % 65   LYMPHS PCT % 20   MONOS PCT % 10   EOS PCT % 3     Results from last 7 days   Lab Units 06/28/22  0609 06/23/22  0500 06/22/22  0637   SODIUM mmol/L 139   < > 130*   POTASSIUM mmol/L 4 2   < > 4 2   CHLORIDE mmol/L 106   < > 97*   CO2 mmol/L 24   < > 22   BUN mg/dL 63*   < > 81*   CREATININE mg/dL 2 15*   < > 4 37*   ANION GAP mmol/L 9   < > 11   CALCIUM mg/dL 8 2*   < > 7 9*   ALBUMIN g/dL  --   --  2 7*   TOTAL BILIRUBIN mg/dL  --   --  0 22   ALK PHOS U/L  --   --  100   ALT U/L  --   --  137*   AST U/L  --   --  110*   GLUCOSE RANDOM mg/dL 146*   < > 48*    < > = values in this interval not displayed  Results from last 7 days   Lab Units 06/22/22  0027   INR  1 21*     Results from last 7 days   Lab Units 06/28/22  1620 06/28/22  1337 06/28/22  0746 06/27/22  2251 06/27/22  1608 06/27/22  1118 06/27/22  0915 06/27/22  0743 06/26/22  2129 06/26/22  1523 06/26/22  1129 06/26/22  0739   POC GLUCOSE mg/dl 148* 121 165* 172* 98 123 128 89 159* 184* 206* 137                   * I Have Reviewed All Lab Data Listed Above  * Additional Pertinent Lab Tests Reviewed:  Ka[facility] Admission Reviewed    Imaging:    Imaging Reports Reviewed Today Include: stress test    Recent Cultures (last 7 days):           Last 24 Hours Medication List:   Current Facility-Administered Medications   Medication Dose Route Frequency Provider Last Rate    acetaminophen  650 mg Oral Q6H PRN MD Emanuel Bhatia oxide-diphenhydramine-lidocaine viscous  10 mL Swish & Swallow Q6H PRN CHERELLE Muñoz      allopurinol  300 mg Oral Daily Debi House PA-C      aluminum-magnesium hydroxide-simethicone  30 mL Oral Q4H PRN [facility], DO      amLODIPine  10 mg Oral Daily Delfina Thomas MD      aspirin  81 mg Oral Daily Zo Flores, DO      atorvastatin  40 mg Oral Daily With [employer] MARY House PA-C      butalbital-acetaminophen-caffeine  1 tablet Oral Q4H PRN Faina Lazo MD      citalopram  40 mg Oral Daily Debi House PA-C      clopidogrel  75 mg Oral Daily Debi House PA-C      heparin (porcine)  5,000 Units Subcutaneous [facility] Nura Desai MD      HYDROcodone-acetaminophen  1 tablet Oral Q6H PRN [facility], DO      insulin glargine  30 Units Subcutaneous Q12H [facility], DO      insulin lispro  1-6 Units Subcutaneous 4x Daily (AC & HS) Debi House PA-C      insulin lispro  10 Units Subcutaneous TID With Meals [facility], DO      isosorbide mononitrate  60 mg Oral Daily Debi House PA-C      levothyroxine  50 mcg Oral Early Morning Debi House PA-C      naloxone  2 mg Intravenous Once [facility], DO      OLANZapine  5 mg Oral HS Debi House PA-C      ondansetron  4 mg Intravenous Q6H PRN Lakeisha Meier PA-C      senna-docusate sodium  1 tablet Oral BID [facility], DO      sorbitol  30 mL Oral Daily [facility], DO          Today, Patient Was Seen By: Romaine Gutierres MD    ** Please Note: Dictation voice to text software may have been used in the creation of this document   **

## 2022-06-28 NOTE — PHYSICAL THERAPY NOTE
Physical Therapy Cancellation Note    Patient's Name: Max Mitchell       06/28/22 1458   PT Last Visit   PT Visit Date 06/28/22   Note Type   Note Type Cancelled Session   Cancel Reasons Other   Attempted to see pt for PT multiple times today - pt in the bathroom / off the floor at stress test  Will defer PT for today       Kia Weems, PT, DPT

## 2022-06-28 NOTE — ASSESSMENT & PLAN NOTE
CAD   Presented to institution for chest pain evaluation  Most recent cardiac catheterization 8/21:  Revealed 100% chronic total occlusion of proximal RCA, 30% stenosis proximal LAD at the site of prior stent, mid LAD stent, distal LAD stent, proximal circumflex stent patent  · 2D echo with decreased ejection fraction of 30%, decreased from 45% last year  New wall motion abnormalities  · Continue Plavix, statin, isosorbide  No beta-blocker due to bradycardia    Stress test showing abnormal myocardial perfusion scan  Cardiology planning a repeat echocardiogram ayana  For possible cardiac cath with nephrology clearance

## 2022-06-28 NOTE — ASSESSMENT & PLAN NOTE
Lab Results   Component Value Date    HGBA1C 7 3 (A) 03/29/2022       Recent Labs     06/27/22  2251 06/28/22  0746 06/28/22  1337 06/28/22  1620   POCGLU 172* 165* 121 148*       Blood Sugar Average: Last 72 hrs:  (P) 169 4873     Patient has history of diabetes type 2, with long-term use of insulin, with associated chronic kidney disease stage  · Home regimen: Lantus 50U BID and scheduled humalog 20 t i d   Plus Trulicity once a week  · Inpatient regimen:  Lantus 30U BID, Humalog 10 units t i d , sliding scale

## 2022-06-28 NOTE — QUICK NOTE
Went to see patient, she is off the floor for cardiac stress test   Labs reviewed, renal function continues to improve with creatinine down to 2 15 that is her baseline  Follow cardiac stress test   Diuretics currently on hold    Discussed with primary team

## 2022-06-28 NOTE — PLAN OF CARE
Problem: MOBILITY - ADULT  Goal: Maintain or return to baseline ADL function  Description: INTERVENTIONS:  -  Assess patient's ability to carry out ADLs; assess patient's baseline for ADL function and identify physical deficits which impact ability to perform ADLs (bathing, care of mouth/teeth, toileting, grooming, dressing, etc )  - Assess/evaluate cause of self-care deficits   - Assess range of motion  - Assess patient's mobility; develop plan if impaired  - Assess patient's need for assistive devices and provide as appropriate  - Encourage maximum independence but intervene and supervise when necessary  - Involve family in performance of ADLs  - Assess for home care needs following discharge   - Consider OT consult to assist with ADL evaluation and planning for discharge  - Provide patient education as appropriate  Outcome: Progressing  Goal: Maintains/Returns to pre admission functional level  Description: INTERVENTIONS:  - Perform BMAT or MOVE assessment daily    - Set and communicate daily mobility goal to care team and patient/family/caregiver  - Collaborate with rehabilitation services on mobility goals if consulted  - Perform Range of Motion  times a day  - Reposition patient every  hours  - Dangle patient  times a day  - Stand patient 3 times a day  - Ambulate patient 3 times a day  - Out of bed to chair 33 times a day   - Out of bed for meals 3 times a day  - Out of bed for toileting  - Record patient progress and toleration of activity level   Outcome: Progressing     Problem: Nutrition/Hydration-ADULT  Goal: Nutrient/Hydration intake appropriate for improving, restoring or maintaining nutritional needs  Description: Monitor and assess patient's nutrition/hydration status for malnutrition  Collaborate with interdisciplinary team and initiate plan and interventions as ordered  Monitor patient's weight and dietary intake as ordered or per policy   Utilize nutrition screening tool and intervene as necessary  Determine patient's food preferences and provide high-protein, high-caloric foods as appropriate       INTERVENTIONS:  - Monitor oral intake, urinary output, labs, and treatment plans  - Assess nutrition and hydration status and recommend course of action  - Evaluate amount of meals eaten  - Assist patient with eating if necessary   - Allow adequate time for meals  - Recommend/ encourage appropriate diets, oral nutritional supplements, and vitamin/mineral supplements  - Order, calculate, and assess calorie counts as needed  - Recommend, monitor, and adjust tube feedings and TPN/PPN based on assessed needs  - Assess need for intravenous fluids  - Provide specific nutrition/hydration education as appropriate  - Include patient/family/caregiver in decisions related to nutrition  Outcome: Progressing     Problem: Potential for Falls  Goal: Patient will remain free of falls  Description: INTERVENTIONS:  - Educate patient/family on patient safety including physical limitations  - Instruct patient to call for assistance with activity   - Consult OT/PT to assist with strengthening/mobility   - Keep Call bell within reach  - Keep bed low and locked with side rails adjusted as appropriate  - Keep care items and personal belongings within reach  - Initiate and maintain comfort rounds  - Make Fall Risk Sign visible to staff  - Offer Toileting every Hours, in advance of need  - Initiate/Maintain alarm  - Obtain necessary fall risk management equipment:   - Apply yellow socks and bracelet for high fall risk patients  - Consider moving patient to room near nurses station  Outcome: Progressing     Problem: INFECTION - ADULT  Goal: Absence or prevention of progression during hospitalization  Description: INTERVENTIONS:  - Assess and monitor for signs and symptoms of infection  - Monitor lab/diagnostic results  - Monitor all insertion sites, i e  indwelling lines, tubes, and drains  - Monitor endotracheal if appropriate and nasal secretions for changes in amount and color  - [place] appropriate cooling/warming therapies per order  - Administer medications as ordered  - Instruct and encourage patient and family to use good hand hygiene technique  - Identify and instruct in appropriate isolation precautions for identified infection/condition  Outcome: Progressing     Problem: GENITOURINARY - ADULT  Goal: Urinary catheter remains patent  Description: INTERVENTIONS:  - Assess patency of urinary catheter  - If patient has a chronic morales, consider changing catheter if non-functioning  - Follow guidelines for intermittent irrigation of non-functioning urinary catheter  Outcome: Progressing     Problem: METABOLIC, FLUID AND ELECTROLYTES - ADULT  Goal: Fluid balance maintained  Description: INTERVENTIONS:  - Monitor labs   - Monitor I/O and WT  - Instruct patient on fluid and nutrition as appropriate  - Assess for signs & symptoms of volume excess or deficit  Outcome: Progressing  Goal: Glucose maintained within target range  Description: INTERVENTIONS:  - Monitor Blood Glucose as ordered  - Assess for signs and symptoms of hyperglycemia and hypoglycemia  - Administer ordered medications to maintain glucose within target range  - Assess nutritional intake and initiate nutrition service referral as needed  Outcome: Progressing     Problem: HEMATOLOGIC - ADULT  Goal: Maintains hematologic stability  Description: INTERVENTIONS  - Assess for signs and symptoms of bleeding or hemorrhage  - Monitor labs  - Administer supportive blood products/factors as ordered and appropriate  Outcome: Progressing     Problem: DISCHARGE PLANNING  Goal: Discharge to home or other facility with appropriate resources  Description: INTERVENTIONS:  - Identify barriers to discharge w/patient and caregiver  - Arrange for needed discharge resources and transportation as appropriate  - Identify discharge learning needs (meds, wound care, etc )  - Arrange for interpretive services to assist at discharge as needed  - Refer to Case Management Department for coordinating discharge planning if the patient needs post-hospital services based on physician/advanced practitioner order or complex needs related to functional status, cognitive ability, or social support system  Outcome: Progressing     Problem: Knowledge Deficit  Goal: Patient/family/caregiver demonstrates understanding of disease process, treatment plan, medications, and discharge instructions  Description: Complete learning assessment and assess knowledge base    Interventions:  - Provide teaching at level of understanding  - Provide teaching via preferred learning methods  Outcome: Progressing

## 2022-06-28 NOTE — PLAN OF CARE
Problem: MOBILITY - ADULT  Goal: Maintain or return to baseline ADL function  Description: INTERVENTIONS:  -  Assess patient's ability to carry out ADLs; assess patient's baseline for ADL function and identify physical deficits which impact ability to perform ADLs (bathing, care of mouth/teeth, toileting, grooming, dressing, etc )  - Assess/evaluate cause of self-care deficits   - Assess range of motion  - Assess patient's mobility; develop plan if impaired  - Assess patient's need for assistive devices and provide as appropriate  - Encourage maximum independence but intervene and supervise when necessary  - Involve family in performance of ADLs  - Assess for home care needs following discharge   - Consider OT consult to assist with ADL evaluation and planning for discharge  - Provide patient education as appropriate  Outcome: Progressing  Goal: Maintains/Returns to pre admission functional level  Description: INTERVENTIONS:  - Perform BMAT or MOVE assessment daily    - Set and communicate daily mobility goal to care team and patient/family/caregiver  - Collaborate with rehabilitation services on mobility goals if consulted  - Perform Range of Motion  times a day  - Reposition patient every  hours  - Dangle patient  times a day  - Stand patient  times a day  - Ambulate patient  times a day  - Out of bed to chair  times a day   - Out of bed for meals  times a day  - Out of bed for toileting  - Record patient progress and toleration of activity level   Outcome: Progressing     Problem: Nutrition/Hydration-ADULT  Goal: Nutrient/Hydration intake appropriate for improving, restoring or maintaining nutritional needs  Description: Monitor and assess patient's nutrition/hydration status for malnutrition  Collaborate with interdisciplinary team and initiate plan and interventions as ordered  Monitor patient's weight and dietary intake as ordered or per policy   Utilize nutrition screening tool and intervene as necessary  Determine patient's food preferences and provide high-protein, high-caloric foods as appropriate       INTERVENTIONS:  - Monitor oral intake, urinary output, labs, and treatment plans  - Assess nutrition and hydration status and recommend course of action  - Evaluate amount of meals eaten  - Assist patient with eating if necessary   - Allow adequate time for meals  - Recommend/ encourage appropriate diets, oral nutritional supplements, and vitamin/mineral supplements  - Order, calculate, and assess calorie counts as needed  - Recommend, monitor, and adjust tube feedings and TPN/PPN based on assessed needs  - Assess need for intravenous fluids  - Provide specific nutrition/hydration education as appropriate  - Include patient/family/caregiver in decisions related to nutrition  Outcome: Progressing     Problem: Potential for Falls  Goal: Patient will remain free of falls  Description: INTERVENTIONS:  - Educate patient/family on patient safety including physical limitations  - Instruct patient to call for assistance with activity   - Consult OT/PT to assist with strengthening/mobility   - Keep Call bell within reach  - Keep bed low and locked with side rails adjusted as appropriate  - Keep care items and personal belongings within reach  - Initiate and maintain comfort rounds  - Make Fall Risk Sign visible to staff  - Offer Toileting every  Hours, in advance of need  - Initiate/Maintain arm  - Obtain necessary fall risk management equipment:   - Apply yellow socks and bracelet for high fall risk patients  - Consider moving patient to room near nurses station  Outcome: Progressing     Problem: INFECTION - ADULT  Goal: Absence or prevention of progression during hospitalization  Description: INTERVENTIONS:  - Assess and monitor for signs and symptoms of infection  - Monitor lab/diagnostic results  - Monitor all insertion sites, i e  indwelling lines, tubes, and drains  - Monitor endotracheal if appropriate and nasal secretions for changes in amount and color  - [place] appropriate cooling/warming therapies per order  - Administer medications as ordered  - Instruct and encourage patient and family to use good hand hygiene technique  - Identify and instruct in appropriate isolation precautions for identified infection/condition  Outcome: Progressing     Problem: GENITOURINARY - ADULT  Goal: Urinary catheter remains patent  Description: INTERVENTIONS:  - Assess patency of urinary catheter  - If patient has a chronic morales, consider changing catheter if non-functioning  - Follow guidelines for intermittent irrigation of non-functioning urinary catheter  Outcome: Progressing     Problem: METABOLIC, FLUID AND ELECTROLYTES - ADULT  Goal: Fluid balance maintained  Description: INTERVENTIONS:  - Monitor labs   - Monitor I/O and WT  - Instruct patient on fluid and nutrition as appropriate  - Assess for signs & symptoms of volume excess or deficit  Outcome: Progressing  Goal: Glucose maintained within target range  Description: INTERVENTIONS:  - Monitor Blood Glucose as ordered  - Assess for signs and symptoms of hyperglycemia and hypoglycemia  - Administer ordered medications to maintain glucose within target range  - Assess nutritional intake and initiate nutrition service referral as needed  Outcome: Progressing     Problem: HEMATOLOGIC - ADULT  Goal: Maintains hematologic stability  Description: INTERVENTIONS  - Assess for signs and symptoms of bleeding or hemorrhage  - Monitor labs  - Administer supportive blood products/factors as ordered and appropriate  Outcome: Progressing     Problem: DISCHARGE PLANNING  Goal: Discharge to home or other facility with appropriate resources  Description: INTERVENTIONS:  - Identify barriers to discharge w/patient and caregiver  - Arrange for needed discharge resources and transportation as appropriate  - Identify discharge learning needs (meds, wound care, etc )  - Arrange for interpretive services to assist at discharge as needed  - Refer to Case Management Department for coordinating discharge planning if the patient needs post-hospital services based on physician/advanced practitioner order or complex needs related to functional status, cognitive ability, or social support system  Outcome: Progressing     Problem: Knowledge Deficit  Goal: Patient/family/caregiver demonstrates understanding of disease process, treatment plan, medications, and discharge instructions  Description: Complete learning assessment and assess knowledge base    Interventions:  - Provide teaching at level of understanding  - Provide teaching via preferred learning methods  Outcome: Progressing

## 2022-06-28 NOTE — PROGRESS NOTES
CARDIOLOGY INPATIENT FOLLOW-UP PROGRESS NOTE  *-*-*-*-*-*-*-*-*-*-*-*-*-*-*-*-*-*-*-*-*-*-*-*-*-*-*-*-*-*-*-*-*-*-*-*-*-*-*-*-*-*-*-*-*-*-*-*-*-*-*-*-*-*-  [id number] DATE: 06/28/22 5:53 PM   AUTHOR: Madison Keane MD  PATIENT: Annmarie Anne   1960    [id number]   63 y o    female  INPATIENT HOSPITALIST PHYSICIAN: Pat Herrera DO  DATE OF ADMISSION: 6/21/2022 10:21 PM; LENGTH OF STAY: 6 days  *-*-*-*-*-*-*-*-*-*-*-*-*-*-*-*-*-*-*-*-*-*-*-*-*-*-*-*-*-*-*-*-*-*-*-*-*-*-*-*-*-*-*-*-*-*-*-*-*-*-*-*-*-*-    CARDIOLOGY ASSESSMENT:  1  Coronary artery disease with history of multiple PCIs in the past with chronic total occlusion of RCA, now with unspecified chest pain  2  Acute change in left ventricular function with EF down to 30% with new regional wall motion abnormalities  3  Acute renal failure superimposed on chronic kidney disease  4  Recent acute enteritis  5  Ischemic cardiomyopathy with moderate to markedly reduced left ventricular systolic function  6  Dyslipidemia  7  Anemia of chronic kidney disease  8  Diabetes mellitus  9  Polycystic ovarian syndrome  10  Fibromyalgia  11  Neurogenic bladder with suprapubic catheter in place  12  Uncorrected obstructive sleep apnea  13  Mild pulmonary hypertension    *-*-*-*-*-*-*-*-*-*-*-*-*-*-*-*-*-*-*-*-*-*-*-*-*-*-*-*-*-*-*-*-*-*-*-*-*-*-*-*-*-*-*-*-*-*-*-*-*-*-*-*-*-*-    CURRENT CARDIAC MEDICATIONS:  Aspirin 81 mg daily, atorvastatin 40 mg daily, clopidogrel 75 mg daily, isosorbide mononitrate 60 mg daily,    PERTINENT LABS AND OTHER INVESTIGATIONS:  Creatinine down to 2 66, sodium 140, potassium 4 3, BUN 72, magnesium 3 1 yesterday  Lipids well controlled    Peak troponin during current admission was 9361    ECHOCARDIOGRAM AND OTHER CARDIAC TESTS RESULTS:  Echocardiogram June 22, 2022 showed EF of 30% by visual assessment with marked regional wall motion abnormalities, mild left atrial cavity enlargement, normal right ventricular size and systolic function, no aortic valve stenosis or regurgitation, no mitral valve stenosis or regurgitation, mild tricuspid valve regurgitation, mild pulmonic valve regurgitation, mild pulmonary hypertension with estimated RVSP/PASP 39 mmHg  Test today adenoson nuclear stress today was significant for multiple regional wall motion abnormalitese however the study was affected by significant soft tissue and diaphragmatic attenuation artifact: There is large size, moderate intensity, reversible inferolateral and basal lateral defect consistent with ischemia although cannot exclude soft tissue attenuation artifact  There is moderate size mild intensity  anterior and anteroseptal reversible defect suggestive of ischemia although cannot definitively exclude breast attenuation artifact       There is small size moderate intensity partially reversible inferior apical defect consistent with ischemia  *-*-*-*-*-*-*-*-*-*-*-*-*-*-*-*-*-*-*-*-*-*-*-*-*-*-*-*-*-*-*-*-*-*-*-*-*-*-*-*-*-*-*-*-*-*-*-*-*-*-*-*-*-*-  Patient's blood pressure is better controlled  Significance of nuclear stress test findings is unclear  It could represent significant triple-vessel coronary artery disease or it could be due to significant soft tissue, breast and diaphragmatic attenuation artifact  Patient's renal function is improving however she continues to have significant risk for hemodialysis requiring CKD if she undergoes another cardiac catheterization  PLAN:  -- will continue current cardiac medications for now  -- patient should be ambulated in the morning with physical therapy is to evaluate for exertional angina-like symptoms  -- will do a limited echocardiogram with definity contrast tomorrow  -- we will review her images of last cardiac catheterization with our interventional cardiology colleague and depending on the renal function tomorrow and feasibility for interventions will plan for cardiac catheterization on Thursday    -- will check NT proBNP level with a m  Labs     -- continued close monitoring for signs of heart failure  -- requested Medicine comanagement of likely migraine headache as patient reports chronic headaches  -- amlodipine is being continued for now and will make a decision whether to replace it with increased dose of isosorbide after review of echocardiogram in a m  [facility] -- if blood pressure remains elevated will add additional evening isosorbide mononitrate  Can also increase the morning dose to 90 mg daily  *-*-*-*-*-*-*-*-*-*-*-*-*-*-*-*-*-*-*-*-*-*-*-*-*-*-*-*-*-*-*-*-*-*-*-*-*-*-*-*-*-*-*-*-*-*-*-*-*-*-*-*-*-*-  INTERVAL CHANGES / HISTORY OF PRESENT ILLNESS:   No acute events overnight  Patient has had no chest pain or shortness of breath or dizziness or lightheadedness  Come in use complain of severe frontal headache which is waxing and waning  *-*-*-*-*-*-*-*-*-*-*-*-*-*-*-*-*-*-*-*-*-*-*-*-*-*-*-*-*-*-*-*-*-*-*-*-*-*-*-*-*-*-*-*-*-*-*-*-*-*-*-*-*-*    PERTINENT REVIEW OF SYMPTOMS:  As noted above in HPI    *-*-*-*-*-*-*-*-*-*-*-*-*-*-*-*-*-*-*-*-*-*-*-*-*-*-*-*-*-*-*-*-*-*-*-*-*-*-*-*-*-*-*-*-*-*-*-*-*-*-*-*-*-*-  VITAL SIGNS:  Vitals:    22 2253 22 0600 22 0744 22 1537   BP: 140/75  141/73 138/73   BP Location: Left arm  Right arm Right arm   Pulse: 73  74 80   Resp: 16  18 18   Temp:   97 6 °F (36 4 °C) (!) 97 4 °F (36 3 °C)   TempSrc:   Temporal Temporal   SpO2: 97%  96% 97%   Weight:  127 kg (280 lb 3 2 oz)     Height:          Temp (24hrs), Av 5 °F (36 4 °C), Min:97 4 °F (36 3 °C), Max:97 6 °F (36 4 °C)  Current: Temperature: (!) 97 4 °F (36 3 °C)    Weight    22 1342 22 0600 22 0551 22 0600   Weight: 130 kg (286 lb) 129 kg (285 lb 7 9 oz) 130 kg (287 lb 7 7 oz) 129 kg (285 lb 0 9 oz)    22 0600 22 0600   Weight: 127 kg (279 lb 8 7 oz) 127 kg (280 lb 3 2 oz)      Body mass index is 42 6 kg/m²   Wt Readings from Last 3 Encounters: 06/28/22 127 kg (280 lb 3 2 oz)   06/19/22 128 kg (282 lb)   04/13/22 128 kg (282 lb)      Intake/Output Summary (Last 24 hours) at 6/28/2022 1753  Last data filed at 6/28/2022 0600  Gross per 24 hour   Intake --   Output 1850 ml   Net -1850 ml          *-*-*-*-*-*-*-*-*-*-*-*-*-*-*-*-*-*-*-*-*-*-*-*-*-*-*-*-*-*-*-*-*-*-*-*-*-*-*-*-*-*-*-*-*-*-*-*-*-*-*-*-*-*-  PHYSICAL EXAM:  General Appearance:    Alert, cooperative, in no respiratory distress, appears stated age, appears uncomfortable due to headache   Head, Eyes, ENT:    No obvious abnormality, moist mucous mebranes  Neck:   Supple, no carotid bruit or JVD   Back:     Symmetric, no curvature  Lungs:     Respirations unlabored  Clear to auscultation bilaterally,    Chest wall:    No tenderness or deformity   Heart:    Regular rate and rhythm, S1 and S2 normal, no murmur, rub  or gallop  Abdomen:     Soft, non-tender, has suprapubic catheter    Extremities:   Extremities warm, no cyanosis or edema    Skin:   Skin color, texture, turgor normal, no rashes or lesions     *-*-*-*-*-*-*-*-*-*-*-*-*-*-*-*-*-*-*-*-*-*-*-*-*-*-*-*-*-*-*-*-*-*-*-*-*-*-*-*-*-*-*-*-*-*-*-*-*-*-*-*-*-*-  TELEMETRY, LAST ECG:  Telemetry reviewed      Not on telemetry Results for orders placed or performed during the hospital encounter of 06/21/22   ECG 12 lead   Result Value    Ventricular Rate 47    Atrial Rate 47    AL Interval 168    QRSD Interval 152    QT Interval 510    QTC Interval 451    P Axis 32    QRS Axis 148    T Wave Axis -67    Narrative    Marked sinus bradycardia  Non-specific intra-ventricular conduction block  Possible Lateral infarct (cited on or before 22-JUN-2022)  Abnormal ECG  When compared with ECG of 22-JUN-2022 04:34, (unconfirmed)  No significant change was found  Confirmed by Delicia Gurrola ([id number]) on 6/22/2022 9:03:28 AM      *-*-*-*-*-*-*-*-*-*-*-*-*-*-*-*-*-*-*-*-*-*-*-*-*-*-*-*-*-*-*-*-*-*-*-*-*-*-*-*-*-*-*-*-*-*-*-*-*-*-*-*-*-*-      CURRENT SCHEDULED MEDICATIONS:    Current Facility-Administered Medications:     acetaminophen (TYLENOL) tablet 650 mg, 650 mg, Oral, Q6H PRN, Madeleine Tamayo MD, 650 mg at 06/28/22 0836    al mag oxide-diphenhydramine-lidocaine viscous (MAGIC MOUTHWASH) suspension 10 mL, 10 mL, Swish & Swallow, Q6H PRN, CHERELLE Dunaway, 10 mL at 06/25/22 2129    allopurinol (ZYLOPRIM) tablet 300 mg, 300 mg, Oral, Daily, Debi House PA-C, 300 mg at 06/28/22 0836    aluminum-magnesium hydroxide-simethicone (MYLANTA) oral suspension 30 mL, 30 mL, Oral, Q4H PRN, Elizabeth Tolentino DO    amLODIPine (NORVASC) tablet 10 mg, 10 mg, Oral, Daily, Delfina Thomas MD, 10 mg at 06/28/22 0836    aspirin chewable tablet 81 mg, 81 mg, Oral, Daily, Sahra Salinas DO, 81 mg at 06/28/22 0836    atorvastatin (LIPITOR) tablet 40 mg, 40 mg, Oral, Daily With Radhika House PA-C, 40 mg at 06/28/22 1733    citalopram (CeleXA) tablet 40 mg, 40 mg, Oral, Daily, Debi House PA-C, 40 mg at 06/28/22 0836    clopidogrel (PLAVIX) tablet 75 mg, 75 mg, Oral, Daily, Debi House PA-C, 75 mg at 06/28/22 0837    heparin (porcine) subcutaneous injection 5,000 Units, 5,000 Units, Subcutaneous, Q8H [address], Adi Bowman MD, 5,000 Units at 06/28/22 1336    HYDROcodone-acetaminophen (NORCO) 5-325 mg per tablet 1 tablet, 1 tablet, Oral, Q6H PRN, Elizabeth Tolentino DO, 1 tablet at 06/28/22 1733    insulin glargine (LANTUS) subcutaneous injection 30 Units 0 3 mL, 30 Units, Subcutaneous, Q12H [address], Elizabeth Tolentino DO, 30 Units at 06/28/22 0836    insulin lispro (HumaLOG) 100 units/mL subcutaneous injection 1-6 Units, 1-6 Units, Subcutaneous, 4x Daily (AC & HS), 1 Units at 06/28/22 0837 **AND** Fingerstick Glucose (POCT), , , 4x Daily AC and at bedtime, Debi House PA-C    insulin lispro (HumaLOG) 100 units/mL subcutaneous injection 10 Units, 10 Units, Subcutaneous, TID With Meals, Elizabeth Tolentino DO, 10 Units at 06/28/22 1733    isosorbide mononitrate (IMDUR) 24 hr tablet 60 mg, 60 mg, Oral, Daily, Debi House PA-C, 60 mg at 06/28/22 0836    levothyroxine tablet 50 mcg, 50 mcg, Oral, Early Morning, Debi House PA-C, 50 mcg at 06/28/22 0524    naloxone [facility]) injection 2 mg, 2 mg, Intravenous, Once, Esha Sarah DO    OLANZapine (ZyPREXA) tablet 5 mg, 5 mg, Oral, HS, Debi House PA-C, 5 mg at 06/27/22 2253    ondansetron (ZOFRAN) injection 4 mg, 4 mg, Intravenous, Q6H PRN, Debi House PA-C, 4 mg at 06/28/22 0842    senna-docusate sodium (SENOKOT S) 8 6-50 mg per tablet 1 tablet, 1 tablet, Oral, BID, Esha Sarah DO, 1 tablet at 06/28/22 1733    sorbitol 70 % solution 30 mL, 30 mL, Oral, Daily, Daryl Mayfield DO, 30 mL at 06/28/22 4212 ALLERGIES:  Allergies   Allergen Reactions    Codeine      Other reaction(s): Nausea and Vomiting    Latex Itching        *-*-*-*-*-*-*-*-*-*-*-*-*-*-*-*-*-*-*-*-*-*-*-*-*-*-*-*-*-*-*-*-*-*-*-*-*-*-*-*-*-*-*-*-*-*-*-*-*-*-*-*-*-*  LABORATORY DATA:  I have personally reviewed pertinent labs  CMP:  Results from last 7 days   Lab Units 06/28/22  0609 06/27/22  0606 06/26/22  0644 06/23/22  0500 06/22/22  0637 06/21/22  2249   SODIUM mmol/L 139 140 137   < > 130* 129*   POTASSIUM mmol/L 4 2 4 3 5 0   < > 4 2 4 2   CHLORIDE mmol/L 106 107 105   < > 97* 96*   CO2 mmol/L 24 24 24   < > 22 24   BUN mg/dL 63* 72* 82*   < > 81* 78*   CREATININE mg/dL 2 15* 2 66* 3 22*   < > 4 37* 4 23*   CALCIUM mg/dL 8 2* 8 4 8 1*   < > 7 9* 7 9*   ALK PHOS U/L  --   --   --   --  100 103   ALT U/L  --   --   --   --  137* 110*   AST U/L  --   --   --   --  110* 92*    < > = values in this interval not displayed          Results from last 7 days   Lab Units 06/28/22  0609 06/26/22  0644   MAGNESIUM mg/dL 2 6 3 1*     Results from last 7 days   Lab Units 06/26/22  0644   PHOSPHORUS mg/dL 5 1*    Cardiac Profile:   Results from last 7 days   Lab Units 06/21/22  2249   CK MB ng/mL 16 4*                CBC: Results from last 7 days   Lab Units 22  0609 22  0644 22  0552   WBC Thousand/uL 9 83 10 96* 10 77*   HEMOGLOBIN g/dL 8 1* 7 9* 8 5*   HEMATOCRIT % 25 5* 23 8* 26 0*   PLATELETS Thousands/uL 319 296 298     PT/INR: No results found for: PT, INR, Microbiology:          *-*-*-*-*-*-*-*-*-*-*-*-*-*-*-*-*-*-*-*-*-*-*-*-*-*-*-*-*-*-*-*-*-*-*-*-*-*-*-*-*-*-*-*-*-*-*-*-*-*-*-*-*-*-  IMAGING STUDIES REPORTS: Imaging studies results reviewed    No valid procedures specified  XR chest 2 views    Result Date: 2/10/2022  Impression Possible trace left pleural effusion  No consolidation or edema  Workstation performed: [id number]       *-*-*-*-*-*-*-*-*-*-*-*-*-*-*-*-*-*-*-*-*-*-*-*-*-*-*-*-*-*-*-*-*-*-*-*-*-*-*-*-*-*-*-*-*-*-*-*-*-*-*-*-*-*-    Results for orders placed during the hospital encounter of 21    Echo complete with contrast if indicated    Narrative  [address] [place] [address]    Transthoracic Echocardiogram  2D, M-mode, Doppler, and Color Doppler    Study date:  24-Aug-2021    Patient: Josiah Rudolph  MR number: [id number]  Account number: [de-identified]  : 1960  Age: 64 years  Gender: Female  Status: Inpatient  Location: [facility]  Height: 68 in  Weight: 312 4 lb  BP: 149/ 79 mmHg    Indications: Heart failure    Diagnoses: I50 9 - Heart failure, unspecified    Sonographer:  CASANDRA Avelar  Interpreting Physician:  Green Bijan, D O  Primary Physician:  Davide Brush MD  Referring Physician:  CHERELLE Galarza  Group:  [facility]    SUMMARY    LEFT VENTRICLE:  Systolic function was mildly reduced by visual assessment  Ejection fraction was estimated to be 45 %  There were regional wall motion abnormalities that suggest coronary artery disease  There was severe hypokinesis of the basal-mid anteroseptal and basal-mid inferoseptal wall(s)    There was moderate hypokinesis of the basal-mid inferior wall(s)  Wall thickness was mildly increased  There was mild concentric hypertrophy  Features were consistent with grade 2 diastolic dysfunction  Doppler parameters were consistent with high ventricular filling pressure  E/E' was > 19    VENTRICULAR SEPTUM:  There was dyssynergic motion  These changes are consistent with LBBB  MITRAL VALVE:  There was mild "progressive" mitral stenosis  Mean gradient of 5 mmHg at 73 bpm  MVA by Doppler continuity equation is 2 15 cm2  TRICUSPID VALVE:  There was mild regurgitation  PULMONIC VALVE:  There was trace regurgitation  PERICARDIUM:  A trace pericardial effusion was identified  HISTORY: PRIOR HISTORY: CAD, CKD Risk factors: hypertension and diabetes  PROCEDURE: The study was performed in the [place] [address]  This was a routine study  The transthoracic approach was used  The study included complete 2D imaging, M-mode, complete spectral Doppler, and color Doppler  The heart rate was 76  bpm, at the start of the study  Images were obtained from the parasternal, apical, subcostal, and suprasternal notch acoustic windows  Image quality was adequate  LEFT VENTRICLE: Size was normal  Systolic function was mildly reduced by visual assessment  Ejection fraction was estimated to be 45 %  There were regional wall motion abnormalities that suggest coronary artery disease  There was severe hypokinesis of the basal-mid anteroseptal and basal-mid inferoseptal wall(s)  There was moderate hypokinesis of the basal-mid inferior wall(s)  Wall thickness was mildly increased  There was mild concentric hypertrophy  DOPPLER: Features were consistent with grade 2 diastolic dysfunction  Doppler parameters were consistent with high ventricular filling pressure  E/E' was > 19    VENTRICULAR SEPTUM: There was dyssynergic motion  These changes are consistent with LBBB      RIGHT VENTRICLE: The size was normal  Systolic function was normal  Wall thickness was normal     LEFT ATRIUM: Size was within the limits of normal when indexed for body surface area  LA volume index 31 ml/m2  RIGHT ATRIUM: Size was normal     MITRAL VALVE: Valve structure was normal  There was mild thickening  There was normal leaflet separation  There was mildly restricted mobility of the anterior leaflet  DOPPLER: The transmitral velocity was within the normal range  There  was mild "progressive" mitral stenosis  Mean gradient of 5 mmHg at 73 bpm  MVA by Doppler continuity equation is 2 15 cm2  There was no regurgitation  AORTIC VALVE: The valve was trileaflet  Leaflets exhibited normal thickness and normal cuspal separation  DOPPLER: Transaortic velocity was within the normal range  There was no evidence for stenosis  There was no regurgitation  TRICUSPID VALVE: The valve structure was normal  There was normal leaflet separation  DOPPLER: The transtricuspid velocity was within the normal range  There was no evidence for stenosis  There was mild regurgitation  Estimated peak PA  pressure was 34 mmHg  PULMONIC VALVE: Not well visualized  DOPPLER: There was no evidence for stenosis  There was trace regurgitation  PERICARDIUM: A trace pericardial effusion was identified  The pericardium was normal in appearance  AORTA: The root exhibited normal size  SYSTEMIC VEINS: IVC: The inferior vena cava was normal in size and course   Respirophasic changes were normal     SYSTEM MEASUREMENT TABLES    2D  %FS: 25 15 %  Ao Diam: 2 77 cm  EDV(Teich): 136 76 ml  EF(Teich): 49 26 %  ESV(Teich): 69 39 ml  IVSd: 1 07 cm  LA Area: 30 26 cm2  LA Diam: 4 91 cm  LVEDV MOD A4C: 210 21 ml  LVEF MOD A4C: 46 53 %  LVESV MOD A4C: 112 41 ml  LVIDd: 5 32 cm  LVIDs: 3 99 cm  LVLd A4C: 9 67 cm  LVLs A4C: 8 21 cm  LVPWd: 1 1 cm  RA Area: 18 38 cm2  RVIDd: 3 28 cm  SV MOD A4C: 97 8 ml  SV(Teich): 67 38 ml    CW  TR Vmax: 2 78 m/s  TR maxP 99 mmHg    MM  TAPSE: 2 42 cm    PW  E' Sept: 0 06 m/s  E/E' Sept: 19 84  MV A [place]: 1 49 m/s  MV Dec [place]: 9 08 m/s2  MV DecT: 137 82 ms  MV E [place]: 1 25 m/s  MV E/A Ratio: 0 84  MV PHT: 39 97 ms  MVA By PHT: 5 5 cm2    [employer] Accredited Echocardi[facility]    Prepared and electronically signed by    NICOLE Del Rosario  Signed 24-Aug-2021 14:54:43    No results found for this or any previous visit  No results found for this or any previous visit  No results found for this or any previous visit         *-*-*-*-*-*-*-*-*-*-*-*-*-*-*-*-*-*-*-*-*-*-*-*-*-*-*-*-*-*-*-*-*-*-*-*-*-*-*-*-*-*-*-*-*-*-*-*-*-*-*-*-*-*-  SIGNATURES:   @XQT@   Madhu Catherine MD

## 2022-06-28 NOTE — ASSESSMENT & PLAN NOTE
Possibly due to anemia of chronic disease  Stable  - no indication for transfusion at this time      Recent Labs     06/26/22  0644 06/27/22  0606 06/28/22  0609   HGB 7 9*  --  8 1*   *  --  101*   RDW 15 9*  --  15 9*   IRON  --  35*  --    TIBC  --  212*  --    FERRITIN  --  80  --

## 2022-06-28 NOTE — OCCUPATIONAL THERAPY NOTE
Occupational Therapy Cancellation Note          Patient Name: Alvaro Diamond  [id number] Date: 6/28/2022 06/28/22 1355   OT Last Visit   OT Visit Date 06/28/22   Note Type   Note Type Cancelled Session   Cancel Reasons Patient off floor/test  (pt off floor at stress test  Will continue to follow to address OT POC )   Jacqueline Fleming MS, OTR/L

## 2022-06-28 NOTE — ASSESSMENT & PLAN NOTE
Wt Readings from Last 3 Encounters:   06/28/22 127 kg (280 lb 3 2 oz)   06/19/22 128 kg (282 lb)   04/13/22 128 kg (282 lb)     History of combined systolic and diastolic congestive heart failure  Repeat echo showing EF of 30% which is decreased from prior echo of 45%  Not in acute exacerbation   - beta-blocker on hold due to bradycardia, diuretics currently on hold by Renal due to ALFRED on superimposed CKD

## 2022-06-28 NOTE — ASSESSMENT & PLAN NOTE
Patient was admitted after syncopal episode with associated hypotension  Due to volume depletion     - status post IV fluids   - Diuretics still on hold

## 2022-06-28 NOTE — ASSESSMENT & PLAN NOTE
Hypovolemic hyponatremia  Resolved with IV fluids  Etiology likely secondary to GI losses      Recent Labs     06/26/22  0644 06/27/22  0606 06/28/22  0609   SODIUM 137 140 139

## 2022-06-29 ENCOUNTER — APPOINTMENT (INPATIENT)
Dept: NON INVASIVE DIAGNOSTICS | Facility: HOSPITAL | Age: 62
DRG: 247 | End: 2022-06-29
Payer: COMMERCIAL

## 2022-06-29 LAB
APICAL FOUR CHAMBER EJECTION FRACTION: 42 %
CHEST PAIN STATEMENT: NORMAL
CREAT UR-MCNC: 40.7 MG/DL
E WAVE DECELERATION TIME: 176 MS
FOLATE BLD-MCNC: 563 NG/ML
FOLATE RBC-MCNC: 2386 NG/ML
FRACTIONAL SHORTENING: 16 (ref 28–44)
GLUCOSE SERPL-MCNC: 102 MG/DL (ref 65–140)
GLUCOSE SERPL-MCNC: 136 MG/DL (ref 65–140)
GLUCOSE SERPL-MCNC: 159 MG/DL (ref 65–140)
GLUCOSE SERPL-MCNC: 224 MG/DL (ref 65–140)
HCT VFR BLD AUTO: 23.6 % (ref 34–46.6)
INTERVENTRICULAR SEPTUM IN DIASTOLE (PARASTERNAL SHORT AXIS VIEW): 1.5 CM
INTERVENTRICULAR SEPTUM: 1.5 CM (ref 0.6–1.1)
LEFT INTERNAL DIMENSION IN SYSTOLE: 4.2 CM (ref 2.1–4)
LEFT VENTRICULAR INTERNAL DIMENSION IN DIASTOLE: 5 CM (ref 3.5–6)
LEFT VENTRICULAR POSTERIOR WALL IN END DIASTOLE: 1.3 CM
LEFT VENTRICULAR STROKE VOLUME: 43 ML
LVSV (TEICH): 43 ML
MAX DIASTOLIC BP: 72 MMHG
MAX HEART RATE: 82 BPM
MAX PREDICTED HEART RATE: 161 BPM
MAX. SYSTOLIC BP: 155 MMHG
MICROALBUMIN UR-MCNC: 1480 MG/L (ref 0–20)
MICROALBUMIN/CREAT 24H UR: 3636 MG/G CREATININE (ref 0–30)
MV E'TISSUE VEL-SEP: 6 CM/S
MV PEAK A VEL: 1.39 M/S
MV PEAK E VEL: 138 CM/S
MV STENOSIS PRESSURE HALF TIME: 51 MS
MV VALVE AREA P 1/2 METHOD: 4.31
PROTOCOL NAME: NORMAL
REASON FOR TERMINATION: NORMAL
SL CV PED ECHO LEFT VENTRICLE DIASTOLIC VOLUME (MOD BIPLANE) 2D: 120 ML
SL CV PED ECHO LEFT VENTRICLE SYSTOLIC VOLUME (MOD BIPLANE) 2D: 77 ML
TARGET HR FORMULA: NORMAL
TIME IN EXERCISE PHASE: NORMAL
TR MAX PG: 15 MMHG
TR PEAK VELOCITY: 1.9 M/S
TRICUSPID VALVE PEAK REGURGITATION VELOCITY: 1.92 M/S

## 2022-06-29 PROCEDURE — 99232 SBSQ HOSP IP/OBS MODERATE 35: CPT | Performed by: INTERNAL MEDICINE

## 2022-06-29 PROCEDURE — 93325 DOPPLER ECHO COLOR FLOW MAPG: CPT | Performed by: INTERNAL MEDICINE

## 2022-06-29 PROCEDURE — 82570 ASSAY OF URINE CREATININE: CPT | Performed by: INTERNAL MEDICINE

## 2022-06-29 PROCEDURE — 82043 UR ALBUMIN QUANTITATIVE: CPT | Performed by: INTERNAL MEDICINE

## 2022-06-29 PROCEDURE — 93308 TTE F-UP OR LMTD: CPT

## 2022-06-29 PROCEDURE — 93325 DOPPLER ECHO COLOR FLOW MAPG: CPT

## 2022-06-29 PROCEDURE — 93308 TTE F-UP OR LMTD: CPT | Performed by: INTERNAL MEDICINE

## 2022-06-29 PROCEDURE — 93321 DOPPLER ECHO F-UP/LMTD STD: CPT | Performed by: INTERNAL MEDICINE

## 2022-06-29 PROCEDURE — 82948 REAGENT STRIP/BLOOD GLUCOSE: CPT

## 2022-06-29 PROCEDURE — 93321 DOPPLER ECHO F-UP/LMTD STD: CPT

## 2022-06-29 RX ORDER — BISOPROLOL FUMARATE 5 MG/1
2.5 TABLET, FILM COATED ORAL DAILY
Status: DISCONTINUED | OUTPATIENT
Start: 2022-06-29 | End: 2022-07-02 | Stop reason: HOSPADM

## 2022-06-29 RX ADMIN — SORBITOL SOLUTION (BULK) 30 ML: 70 SOLUTION at 08:36

## 2022-06-29 RX ADMIN — BUTALBITAL, ACETAMINOPHEN AND CAFFEINE 1 TABLET: 50; 325; 40 TABLET ORAL at 22:31

## 2022-06-29 RX ADMIN — INSULIN LISPRO 10 UNITS: 100 INJECTION, SOLUTION INTRAVENOUS; SUBCUTANEOUS at 17:17

## 2022-06-29 RX ADMIN — ALLOPURINOL 300 MG: 300 TABLET ORAL at 08:35

## 2022-06-29 RX ADMIN — AMLODIPINE BESYLATE 10 MG: 10 TABLET ORAL at 08:35

## 2022-06-29 RX ADMIN — CITALOPRAM HYDROBROMIDE 40 MG: 20 TABLET ORAL at 08:35

## 2022-06-29 RX ADMIN — BISOPROLOL FUMARATE 2.5 MG: 5 TABLET ORAL at 18:43

## 2022-06-29 RX ADMIN — ISOSORBIDE MONONITRATE 60 MG: 60 TABLET, EXTENDED RELEASE ORAL at 08:35

## 2022-06-29 RX ADMIN — INSULIN LISPRO 1 UNITS: 100 INJECTION, SOLUTION INTRAVENOUS; SUBCUTANEOUS at 12:06

## 2022-06-29 RX ADMIN — INSULIN LISPRO 2 UNITS: 100 INJECTION, SOLUTION INTRAVENOUS; SUBCUTANEOUS at 21:32

## 2022-06-29 RX ADMIN — HEPARIN SODIUM 5000 UNITS: 5000 INJECTION INTRAVENOUS; SUBCUTANEOUS at 21:33

## 2022-06-29 RX ADMIN — DOCUSATE SODIUM 50MG AND SENNOSIDES 8.6MG 1 TABLET: 8.6; 5 TABLET, FILM COATED ORAL at 17:17

## 2022-06-29 RX ADMIN — INSULIN LISPRO 10 UNITS: 100 INJECTION, SOLUTION INTRAVENOUS; SUBCUTANEOUS at 12:06

## 2022-06-29 RX ADMIN — CLOPIDOGREL BISULFATE 75 MG: 75 TABLET ORAL at 08:35

## 2022-06-29 RX ADMIN — BUTALBITAL, ACETAMINOPHEN AND CAFFEINE 1 TABLET: 50; 325; 40 TABLET ORAL at 18:43

## 2022-06-29 RX ADMIN — BUTALBITAL, ACETAMINOPHEN AND CAFFEINE 1 TABLET: 50; 325; 40 TABLET ORAL at 01:56

## 2022-06-29 RX ADMIN — INSULIN GLARGINE 30 UNITS: 100 INJECTION, SOLUTION SUBCUTANEOUS at 08:35

## 2022-06-29 RX ADMIN — ATORVASTATIN CALCIUM 40 MG: 40 TABLET, FILM COATED ORAL at 15:58

## 2022-06-29 RX ADMIN — OLANZAPINE 5 MG: 5 TABLET, FILM COATED ORAL at 21:33

## 2022-06-29 RX ADMIN — DOCUSATE SODIUM 50MG AND SENNOSIDES 8.6MG 1 TABLET: 8.6; 5 TABLET, FILM COATED ORAL at 08:35

## 2022-06-29 RX ADMIN — HEPARIN SODIUM 5000 UNITS: 5000 INJECTION INTRAVENOUS; SUBCUTANEOUS at 15:58

## 2022-06-29 RX ADMIN — LEVOTHYROXINE SODIUM 50 MCG: 50 TABLET ORAL at 06:03

## 2022-06-29 RX ADMIN — INSULIN LISPRO 10 UNITS: 100 INJECTION, SOLUTION INTRAVENOUS; SUBCUTANEOUS at 08:35

## 2022-06-29 RX ADMIN — HEPARIN SODIUM 5000 UNITS: 5000 INJECTION INTRAVENOUS; SUBCUTANEOUS at 06:03

## 2022-06-29 RX ADMIN — INSULIN GLARGINE 30 UNITS: 100 INJECTION, SOLUTION SUBCUTANEOUS at 21:33

## 2022-06-29 RX ADMIN — HYDROCODONE BITARTRATE AND ACETAMINOPHEN 1 TABLET: 5; 325 TABLET ORAL at 06:04

## 2022-06-29 RX ADMIN — ASPIRIN 81 MG CHEWABLE TABLET 81 MG: 81 TABLET CHEWABLE at 08:35

## 2022-06-29 NOTE — PLAN OF CARE
Problem: MOBILITY - ADULT  Goal: Maintain or return to baseline ADL function  Description: INTERVENTIONS:  -  Assess patient's ability to carry out ADLs; assess patient's baseline for ADL function and identify physical deficits which impact ability to perform ADLs (bathing, care of mouth/teeth, toileting, grooming, dressing, etc )  - Assess/evaluate cause of self-care deficits   - Assess range of motion  - Assess patient's mobility; develop plan if impaired  - Assess patient's need for assistive devices and provide as appropriate  - Encourage maximum independence but intervene and supervise when necessary  - Involve family in performance of ADLs  - Assess for home care needs following discharge   - Consider OT consult to assist with ADL evaluation and planning for discharge  - Provide patient education as appropriate  Outcome: Progressing  Goal: Maintains/Returns to pre admission functional level  Description: INTERVENTIONS:  - Perform BMAT or MOVE assessment daily    - Set and communicate daily mobility goal to care team and patient/family/caregiver  - Collaborate with rehabilitation services on mobility goals if consulted  - Perform Range of Motion 3 times a day  - Reposition patient every 3 hours  - Dangle patient 3 times a day  - Stand patient 3 times a day  - Ambulate patient 3 times a day  - Out of bed to chair 3 times a day   - Out of bed for meals 3 times a day  - Out of bed for toileting  - Record patient progress and toleration of activity level   Outcome: Progressing     Problem: Nutrition/Hydration-ADULT  Goal: Nutrient/Hydration intake appropriate for improving, restoring or maintaining nutritional needs  Description: Monitor and assess patient's nutrition/hydration status for malnutrition  Collaborate with interdisciplinary team and initiate plan and interventions as ordered  Monitor patient's weight and dietary intake as ordered or per policy   Utilize nutrition screening tool and intervene as necessary  Determine patient's food preferences and provide high-protein, high-caloric foods as appropriate       INTERVENTIONS:  - Monitor oral intake, urinary output, labs, and treatment plans  - Assess nutrition and hydration status and recommend course of action  - Evaluate amount of meals eaten  - Assist patient with eating if necessary   - Allow adequate time for meals  - Recommend/ encourage appropriate diets, oral nutritional supplements, and vitamin/mineral supplements  - Order, calculate, and assess calorie counts as needed  - Recommend, monitor, and adjust tube feedings and TPN/PPN based on assessed needs  - Assess need for intravenous fluids  - Provide specific nutrition/hydration education as appropriate  - Include patient/family/caregiver in decisions related to nutrition  Outcome: Progressing     Problem: Potential for Falls  Goal: Patient will remain free of falls  Description: INTERVENTIONS:  - Educate patient/family on patient safety including physical limitations  - Instruct patient to call for assistance with activity   - Consult OT/PT to assist with strengthening/mobility   - Keep Call bell within reach  - Keep bed low and locked with side rails adjusted as appropriate  - Keep care items and personal belongings within reach  - Initiate and maintain comfort rounds  - Make Fall Risk Sign visible to staff  - Offer Toileting every 3 Hours, in advance of need  - Initiate/Maintain alarm  - Obtain necessary fall risk management equipment  - Apply yellow socks and bracelet for high fall risk patients  - Consider moving patient to room near nurses station  Outcome: Progressing     Problem: INFECTION - ADULT  Goal: Absence or prevention of progression during hospitalization  Description: INTERVENTIONS:  - Assess and monitor for signs and symptoms of infection  - Monitor lab/diagnostic results  - Monitor all insertion sites, i e  indwelling lines, tubes, and drains  - Monitor endotracheal if appropriate and nasal secretions for changes in amount and color  - Notrees appropriate cooling/warming therapies per order  - Administer medications as ordered  - Instruct and encourage patient and family to use good hand hygiene technique  - Identify and instruct in appropriate isolation precautions for identified infection/condition  Outcome: Progressing     Problem: DISCHARGE PLANNING  Goal: Discharge to home or other facility with appropriate resources  Description: INTERVENTIONS:  - Identify barriers to discharge w/patient and caregiver  - Arrange for needed discharge resources and transportation as appropriate  - Identify discharge learning needs (meds, wound care, etc )  - Arrange for interpretive services to assist at discharge as needed  - Refer to Case Management Department for coordinating discharge planning if the patient needs post-hospital services based on physician/advanced practitioner order or complex needs related to functional status, cognitive ability, or social support system  Outcome: Progressing     Problem: Knowledge Deficit  Goal: Patient/family/caregiver demonstrates understanding of disease process, treatment plan, medications, and discharge instructions  Description: Complete learning assessment and assess knowledge base    Interventions:  - Provide teaching at level of understanding  - Provide teaching via preferred learning methods  Outcome: Progressing     Problem: GENITOURINARY - ADULT  Goal: Urinary catheter remains patent  Description: INTERVENTIONS:  - Assess patency of urinary catheter  - If patient has a chronic morales, consider changing catheter if non-functioning  - Follow guidelines for intermittent irrigation of non-functioning urinary catheter  Outcome: Progressing     Problem: METABOLIC, FLUID AND ELECTROLYTES - ADULT  Goal: Fluid balance maintained  Description: INTERVENTIONS:  - Monitor labs   - Monitor I/O and WT  - Instruct patient on fluid and nutrition as appropriate  - Assess for signs & symptoms of volume excess or deficit  Outcome: Progressing  Goal: Glucose maintained within target range  Description: INTERVENTIONS:  - Monitor Blood Glucose as ordered  - Assess for signs and symptoms of hyperglycemia and hypoglycemia  - Administer ordered medications to maintain glucose within target range  - Assess nutritional intake and initiate nutrition service referral as needed  Outcome: Progressing     Problem: HEMATOLOGIC - ADULT  Goal: Maintains hematologic stability  Description: INTERVENTIONS  - Assess for signs and symptoms of bleeding or hemorrhage  - Monitor labs  - Administer supportive blood products/factors as ordered and appropriate  Outcome: Progressing

## 2022-06-29 NOTE — PROGRESS NOTES
Progress Note - Jazmyn Obrien 63 y o  female MRN: [id number]    Unit/Bed#: E4 -01 Encounter: [id number]      Subjective: The patient feels pretty well at the moment  She has had no further chest pain  She denies shortness of breath  She slept pretty well last night  She is eating well  She has no abdominal pain, nausea, or vomiting  Physical Exam:   Temp:  [95 9 °F (35 5 °C)-97 6 °F (36 4 °C)] 95 9 °F (35 5 °C)  HR:  [62-80] 62  Resp:  [18] 18  BP: (126-163)/(60-74) 163/74    Gen:  Well-developed, obese, in no distress  Neck:  Supple  No lymphadenopathy, goiter, or bruit  Heart:  Regular rhythm  No murmur, gallop, or rub  Lungs:  Clear to auscultation and percussion  No wheezing, rales, or rhonchi  Abd:  Soft with active bowel sounds  No mass, tenderness, or organomegaly  Extremities:  No clubbing, cyanosis, or edema  No calf tenderness  Neuro:  Alert and oriented  No focal sign  Skin:  Warm and dry        LABS:   No new labs        Patient Active Problem List   Diagnosis    Cellulitis of finger of right hand    Major depressive disorder    Type 2 diabetes mellitus with stage 4 chronic kidney disease, with long-term current use of insulin (HCC)    Anxiety    CAD (coronary artery disease)    Osteoarthritis of left knee    Anemia    Abnormal LFTs    S/P cervical spinal fusion    Head lump    Diabetic ulcer of toe of right foot associated with type 2 diabetes mellitus, with muscle involvement without evidence of necrosis ([facility])    HTN (hypertension)    Skin ulcer of hand, limited to breakdown of skin ([facility])    Chronic bronchitis ([facility])    Severe episode of recurrent major depressive disorder, without psychotic features ([facility] [address] [facility]    Memory impairment    Stage 4 chronic kidney disease ([address] )    Morbid obesity with BMI of 45 0-49 9, adult ([facility])    History of heart artery stent    Urinary tract infection associated with cystostomy catheter ([address] )    Neurogenic bladder    Chronic combined systolic and diastolic CHF (congestive heart failure) (HCC)    Prolonged QT interval    Other proteinuria    Gastroesophageal reflux disease without esophagitis    Acquired hypothyroidism    Mixed hyperlipidemia    Other artificial openings of urinary tract status (HCC)    Continuous opioid dependence (HCC)    Adrenal nodule (HCC)    Stable angina (HCC)    Volume overload    Encounter for examination following treatment at hospital    ALFRED (acute kidney injury) ([facility] [address] )    Syncope    Acute renal failure superimposed on chronic kidney disease (HCC)    Chest pain    Elevated troponin I level    Hypotension    Gastroenteritis    Hyponatremia    History of anemia due to chronic kidney disease    Bradycardia    Headache       Assessment/Plan:  1  Coronary artery disease  2  Elevated troponin  3  Syncope secondary to hypotension  4  Chronic kidney disease stage 4 with acute kidney injury    5  Anemia of chronic kidney disease   6  Hyponatremia, resolved  7  Bradycardia, beta-blockers on hold  8  Chronic combined systolic and diastolic congestive heart failure  9  Type 2 diabetes with diabetic nephropathy    Fortunately, the patient has been stable overnight  Stress test showed multiple areas of ischemia  The patient's creatinine remains elevated though it has improved significantly since admission  Catheterization is being considered  Obviously, this carries increased risk because of the patient's kidney function  I will discuss the situation with Nephrology and Cardiology        VTE Pharmacologic Prophylaxis: Heparin  VTE Mechanical Prophylaxis: sequential compression device

## 2022-06-29 NOTE — PROGRESS NOTES
NEPHROLOGY PROGRESS NOTE   Hazel Bajwa 63 y o  female MRN: [id number]  Unit/Bed#: E4 -01 Encounter: [id number]      ASSESSMENT & PLAN:  1  Acute kidney injury on top of CKD stage 4, baseline creatinine around 2 5-2 9  Creatinine peak of 4 52 on 04/23  Diuretics on hold  Kidney function improved below baseline with a creatinine down to 2 15 yesterday  Follow labs in the morning  If cardiac catheterization is needed as below, patient will need intravenously fluids pre and post, keep holding diuretics    2  Coronary artery disease with multiple PCIs, elevated troponins, syncopal   Cardiology on board, status post stress test yesterday that showed multiple regional wall motion abnormality however affected by significant soft tissue and diaphragmatic attenuation artifact  Diuretics currently on hold  Plan for repeat echo today and based on results possibility of cardiac catheterization  Discussed with Cardiology, if cardiac catheterization needed, patient will need intravenously fluids pre and post    3  Hemodynamics, blood pressure fluctuating but overall stable, reported patient was admitted with syncope with associated hypotension suspected secondary to volume depletion  Diuretics on hold    4  Urinary retention with chronic suprapubic catheter    5   Anemia, multifactorial in the setting of CKD, monitor H&H, last hemoglobin 8 1 yesterday    My plan and recommendations were discussed with cardiology attending      SUBJECTIVE:  Patient seen and examined, semi sitting in bed, denies any complaints, no chest pain, shortness of breath, no nausea, no vomiting, no abdominal pain      OBJECTIVE:  Current Weight: Weight - Scale: 126 kg (278 lb 7 1 oz)  Vitals:    06/29/22 0752   BP: 163/74   Pulse: 62   Resp: 18   Temp: (!) 95 9 °F (35 5 °C)   SpO2: 93%     No intake or output data in the 24 hours ending 06/29/22 1232    General: conscious, cooperative, in not acute distress  Eyes: conjunctivae pink, anicteric sclerae  ENT: lips and mucous membranes moist  Neck: supple, no JVD  Chest: clear breath sounds bilateral, no crackles, ronchus or wheezings  CVS: distinct S1 & S2, normal rate, regular rhythm  Abdomen:  Obese, non-tender, non-distended, normoactive bowel sounds  Extremities: no edema of both legs  Skin: no rash  Neuro: awake, alert, oriented          Medications:    Current Facility-Administered Medications:     acetaminophen (TYLENOL) tablet 650 mg, 650 mg, Oral, Q6H PRN, Marin Rodgers MD, 650 mg at 06/28/22 0836    al mag oxide-diphenhydramine-lidocaine viscous (MAGIC MOUTHWASH) suspension 10 mL, 10 mL, Swish & Swallow, Q6H PRN, CHERELLE Christensen, 10 mL at 06/25/22 2129    allopurinol (ZYLOPRIM) tablet 300 mg, 300 mg, Oral, Daily, Debi House PA-C, 300 mg at 06/29/22 0835    aluminum-magnesium hydroxide-simethicone (MYLANTA) oral suspension 30 mL, 30 mL, Oral, Q4H PRN, Daryl Mayfield DO    amLODIPine (NORVASC) tablet 10 mg, 10 mg, Oral, Daily, eDlfina Thomas MD, 10 mg at 06/29/22 0835    aspirin chewable tablet 81 mg, 81 mg, Oral, Daily, Rommel Copeland DO, 81 mg at 06/29/22 0835    atorvastatin (LIPITOR) tablet 40 mg, 40 mg, Oral, Daily With Miguel House PA-C, 40 mg at 06/28/22 1733    butalbital-acetaminophen-caffeine (FIORICET,ESGIC) -40 mg per tablet 1 tablet, 1 tablet, Oral, Q4H PRN, Gerald Ramirez MD, 1 tablet at 06/29/22 0156    citalopram (CeleXA) tablet 40 mg, 40 mg, Oral, Daily, Debi House PA-C, 40 mg at 06/29/22 0835    clopidogrel (PLAVIX) tablet 75 mg, 75 mg, Oral, Daily, Debi House PA-C, 75 mg at 06/29/22 0835    heparin (porcine) subcutaneous injection 5,000 Units, 5,000 Units, Subcutaneous, Q8H [facility], Marcus Campos MD, 5,000 Units at 06/29/22 0603    HYDROcodone-acetaminophen (NORCO) 5-325 mg per tablet 1 tablet, 1 tablet, Oral, Q6H PRN, Yahaira Mc DO, 1 tablet at 06/29/22 0604    insulin glargine (LANTUS) subcutaneous injection 30 Units 0 3 mL, 30 Units, Subcutaneous, Q12H [address], [place] Pat, DO, 30 Units at 06/29/22 0835    insulin lispro (HumaLOG) 100 units/mL subcutaneous injection 1-6 Units, 1-6 Units, Subcutaneous, 4x Daily (AC & HS), 1 Units at 06/29/22 1206 **AND** Fingerstick Glucose (POCT), , , 4x Daily AC and at bedtime, Debi House PA-C    insulin lispro (HumaLOG) 100 units/mL subcutaneous injection 10 Units, 10 Units, Subcutaneous, TID With Meals, Suman Mercedes DO, 10 Units at 06/29/22 1206    isosorbide mononitrate (IMDUR) 24 hr tablet 60 mg, 60 mg, Oral, Daily, Debi House PA-C, 60 mg at 06/29/22 0835    levothyroxine tablet 50 mcg, 50 mcg, Oral, Early Morning, Debi House PA-C, 50 mcg at 06/29/22 0603    naloxone [facility]) injection 2 mg, 2 mg, Intravenous, Once, Suman Mercedes DO    OLANZapine (ZyPREXA) tablet 5 mg, 5 mg, Oral, HS, Debi House PA-C, 5 mg at 06/28/22 2138    ondansetron (ZOFRAN) injection 4 mg, 4 mg, Intravenous, Q6H PRN, Debi House PA-C, 4 mg at 06/28/22 0842    senna-docusate sodium (SENOKOT S) 8 6-50 mg per tablet 1 tablet, 1 tablet, Oral, BID, Suman Mercedes DO, 1 tablet at 06/29/22 0835    sorbitol 70 % solution 30 mL, 30 mL, Oral, Daily, Suman Mercedes, DO, 30 mL at 06/29/22 0836    Invasive Devices:        Lab Results:   Results from last 7 days   Lab Units 06/28/22  0609 06/27/22  0606 06/26/22  0644 06/25/22  0552   WBC Thousand/uL 9 83  --  10 96* 10 77*   HEMOGLOBIN g/dL 8 1*  --  7 9* 8 5*   HEMATOCRIT % 25 5*  --  23 8* 26 0*   PLATELETS Thousands/uL 319  --  296 298   SODIUM mmol/L 139 140 137 134*   POTASSIUM mmol/L 4 2 4 3 5 0 4 5   CHLORIDE mmol/L 106 107 105 100   CO2 mmol/L 24 24 24 24   BUN mg/dL 63* 72* 82* 87*   CREATININE mg/dL 2 15* 2 66* 3 22* 3 68*   CALCIUM mg/dL 8 2* 8 4 8 1* 8 1*   MAGNESIUM mg/dL 2 6  --  3 1*  --    PHOSPHORUS mg/dL  --   --  5 1*  --        Previous work up:  See previous notes      Portions of the record may have been created with voice recognition software  Occasional wrong word or "sound a like" substitutions may have occurred due to the inherent limitations of voice recognition software  Read the chart carefully and recognize, using context, where substitutions have occurred  If you have any questions, please contact the dictating provider

## 2022-06-29 NOTE — PROGRESS NOTES
CARDIOLOGY INPATIENT FOLLOW-UP PROGRESS NOTE  *-*-*-*-*-*-*-*-*-*-*-*-*-*-*-*-*-*-*-*-*-*-*-*-*-*-*-*-*-*-*-*-*-*-*-*-*-*-*-*-*-*-*-*-*-*-*-*-*-*-*-*-*-*-  [id number] DATE: 06/29/22 5:33 PM   AUTHOR: Aaron Lopez MD  PATIENT: Angelo Fear   1960    [id number]   63 y o    female  INPATIENT HOSPITALIST PHYSICIAN: Pat Herrera DO  DATE OF ADMISSION: 6/21/2022 10:21 PM; LENGTH OF STAY: 7 days  *-*-*-*-*-*-*-*-*-*-*-*-*-*-*-*-*-*-*-*-*-*-*-*-*-*-*-*-*-*-*-*-*-*-*-*-*-*-*-*-*-*-*-*-*-*-*-*-*-*-*-*-*-*-    CARDIOLOGY ASSESSMENT:  1  Coronary artery disease with history of multiple PCIs in the past with chronic total occlusion of RCA, now with unspecified chest pain  (cardiac catheterization August 2021 showed old mild left main disease, 30% proximal LAD stenosis at site of prior stent, patent mid and distal LAD stents, patent proximal circumflex stent and non dominant RCA with chronic total occlusion)  2  Acute change in left ventricular function with EF down to 30% with new regional wall motion abnormalities  3  Acute renal failure superimposed on chronic kidney disease  4  Recent acute enteritis  5  Ischemic cardiomyopathy with moderate to markedly reduced left ventricular systolic function  6  Dyslipidemia  7  Anemia of chronic kidney disease  8  Diabetes mellitus  9  Polycystic ovarian syndrome  10  Fibromyalgia  11  Neurogenic bladder with suprapubic catheter in place  12  Uncorrected obstructive sleep apnea  13  Mild pulmonary hypertension    *-*-*-*-*-*-*-*-*-*-*-*-*-*-*-*-*-*-*-*-*-*-*-*-*-*-*-*-*-*-*-*-*-*-*-*-*-*-*-*-*-*-*-*-*-*-*-*-*-*-*-*-*-*-    CURRENT CARDIAC MEDICATIONS:  Aspirin 81 mg daily, atorvastatin 40 mg daily, clopidogrel 75 mg daily, isosorbide mononitrate 60 mg daily,    PERTINENT LABS AND OTHER INVESTIGATIONS:  Creatinine down to 2 66, sodium 140, potassium 4 3, BUN 72, magnesium 3 1 yesterday  Lipids well controlled    Peak troponin during current admission was 9361    ECHOCARDIOGRAM AND OTHER CARDIAC TESTS RESULTS:      Limited echocardiogram June 29, 2022 is significant for moderate concentric LVH, moderately reduced left ventricular systolic function with marked regional wall motion abnormality similar to previous echo, EF is now 38%  There is normal right ventricular size and function, mild left atrial cavity enlargement, aortic valve sclerosis with trace aortic valve regurgitation, mild mitral and tricuspid and trace pulmonic valve regurgitation, no obvious pulmonary hypertension  There is varying degrees of trace to small circumferential pericardial effusion without echocardiographic evidence of cardiac tamponade    Echocardiogram June 22, 2022 showed EF of 30% by visual assessment with marked regional wall motion abnormalities, mild left atrial cavity enlargement, normal right ventricular size and systolic function, no aortic valve stenosis or regurgitation, no mitral valve stenosis or regurgitation, mild tricuspid valve regurgitation, mild pulmonic valve regurgitation, mild pulmonary hypertension with estimated RVSP/PASP 39 mmHg  Regadenoson nuclear stress test yesterday was significant for multiple regional wall motion abnormalitese however the study was affected by significant soft tissue and diaphragmatic attenuation artifact: There is large size, moderate intensity, reversible inferolateral and basal lateral defect consistent with ischemia although cannot exclude soft tissue attenuation artifact  There is moderate size mild intensity  anterior and anteroseptal reversible defect suggestive of ischemia although cannot definitively exclude breast attenuation artifact       There is small size moderate intensity partially reversible inferior apical defect consistent with ischemia  *-*-*-*-*-*-*-*-*-*-*-*-*-*-*-*-*-*-*-*-*-*-*-*-*-*-*-*-*-*-*-*-*-*-*-*-*-*-*-*-*-*-*-*-*-*-*-*-*-*-*-*-*-*-  Patient is overall hemodynamically stable    Echocardiogram today did demonstrate persisting LV dysfunction the LV function is slightly improved  Competing use to have wall motion abnormalities which are consistent with findings on nuclear stress test   In light of abnormal nuclear stress test and echocardiogram it does seem that she may have significant occlusive CAD and cardiac catheterization is recommended unless risk of going on hemodialysis is very high  Discussed case with nephrologist earlier who has recommended that it may be reasonable to proceed with cardiac catheterization with good hydration before and during and after the procedure and with use of nonionic contrast     PLAN:  -- restart beta-blocker therapy while continuing other medications  -- will plan for cardiac catheterization on Friday morning with hydration and other precautions as guided by our Nephrology colleagues  -- repeat basic metabolic panel with a m  Labs  Will also review case with our interventional cardiologist before scheduling it for Friday morning  *-*-*-*-*-*-*-*-*-*-*-*-*-*-*-*-*-*-*-*-*-*-*-*-*-*-*-*-*-*-*-*-*-*-*-*-*-*-*-*-*-*-*-*-*-*-*-*-*-*-*-*-*-*-  INTERVAL CHANGES / HISTORY OF PRESENT ILLNESS:   No acute events overnight  Patient reports that her headache is now much better after receiving Fioricet  Denies any chest pain shortness of breath or dizziness      *-*-*-*-*-*-*-*-*-*-*-*-*-*-*-*-*-*-*-*-*-*-*-*-*-*-*-*-*-*-*-*-*-*-*-*-*-*-*-*-*-*-*-*-*-*-*-*-*-*-*-*-*-*    PERTINENT REVIEW OF SYMPTOMS:  As noted above in HPI    *-*-*-*-*-*-*-*-*-*-*-*-*-*-*-*-*-*-*-*-*-*-*-*-*-*-*-*-*-*-*-*-*-*-*-*-*-*-*-*-*-*-*-*-*-*-*-*-*-*-*-*-*-*-  VITAL SIGNS:         Temp (24hrs), Av 8 °F (36 °C), Min:95 9 °F (35 5 °C), Max:97 6 °F (36 4 °C)  Current: Temperature: (!) 96 8 °F (36 °C)    Weight    22 0551 22 0600 22 0600 22 0600   Weight: 130 kg (287 lb 7 7 oz) 129 kg (285 lb 0 9 oz) 127 kg (279 lb 8 7 oz) 127 kg (280 lb 3 2 oz)    22 0600 22 1412   Weight: 126 kg (278 lb 7 1 oz) 126 kg (278 lb)      Body mass index is 42 27 kg/m²  Wt Readings from Last 3 Encounters:   06/29/22 126 kg (278 lb)   06/19/22 128 kg (282 lb)   04/13/22 128 kg (282 lb)    No intake or output data in the 24 hours ending 06/29/22 1733       *-*-*-*-*-*-*-*-*-*-*-*-*-*-*-*-*-*-*-*-*-*-*-*-*-*-*-*-*-*-*-*-*-*-*-*-*-*-*-*-*-*-*-*-*-*-*-*-*-*-*-*-*-*-  PHYSICAL EXAM:  General Appearance:    Alert, cooperative, in no respiratory distress, appears comfortable, large built   Head, Eyes, ENT:    No obvious abnormality, moist mucous mebranes  Neck:   Supple, no carotid bruit or JVD   Back:     Symmetric, no curvature  Lungs:     Respirations unlabored  Clear to auscultation bilaterally,    Chest wall:    No tenderness or deformity   Heart:    Regular rate and rhythm, S1 and S2 normal, no murmur, rub  or gallop  Abdomen:     Soft, non-tender, has suprapubic catheter    Extremities:   Extremities warm, no cyanosis, trace lower extremity edema    Skin:   Skin color, texture, turgor normal, no rashes or lesions     *-*-*-*-*-*-*-*-*-*-*-*-*-*-*-*-*-*-*-*-*-*-*-*-*-*-*-*-*-*-*-*-*-*-*-*-*-*-*-*-*-*-*-*-*-*-*-*-*-*-*-*-*-*-  TELEMETRY, LAST ECG:  Telemetry reviewed      Not on telemetry Results for orders placed or performed during the hospital encounter of 06/21/22   ECG 12 lead   Result Value    Ventricular Rate 47    Atrial Rate 47    WY Interval 168    QRSD Interval 152    QT Interval 510    QTC Interval 451    P Axis 32    QRS Axis 148    T Wave Axis -67    Narrative    Marked sinus bradycardia  Non-specific intra-ventricular conduction block  Possible Lateral infarct (cited on or before 22-JUN-2022)  Abnormal ECG  When compared with ECG of 22-JUN-2022 04:34, (unconfirmed)  No significant change was found  Confirmed by Samara Granger ([id number]) on 6/22/2022 9:03:28 AM *-*-*-*-*-*-*-*-*-*-*-*-*-*-*-*-*-*-*-*-*-*-*-*-*-*-*-*-*-*-*-*-*-*-*-*-*-*-*-*-*-*-*-*-*-*-*-*-*-*-*-*-*-*-      CURRENT SCHEDULED MEDICATIONS:    Current Facility-Administered Medications:     acetaminophen (TYLENOL) tablet 650 mg, 650 mg, Oral, Q6H PRN, Lauryn Rocha MD, 650 mg at 06/28/22 0836    al mag oxide-diphenhydramine-lidocaine viscous (MAGIC MOUTHWASH) suspension 10 mL, 10 mL, Swish & Swallow, Q6H PRN, CHERELLE Rangel, 10 mL at 06/25/22 2129    allopurinol (ZYLOPRIM) tablet 300 mg, 300 mg, Oral, Daily, Debi House PA-C, 300 mg at 06/29/22 0835    aluminum-magnesium hydroxide-simethicone (MYLANTA) oral suspension 30 mL, 30 mL, Oral, Q4H PRN, Brie Fitch DO    amLODIPine (NORVASC) tablet 10 mg, 10 mg, Oral, Daily, Delfina Thomas MD, 10 mg at 06/29/22 0835    aspirin chewable tablet 81 mg, 81 mg, Oral, Daily, Barbara Dickerson DO, 81 mg at 06/29/22 0835    atorvastatin (LIPITOR) tablet 40 mg, 40 mg, Oral, Daily With Lalo House PA-C, 40 mg at 06/29/22 1558    butalbital-acetaminophen-caffeine (FIORICET,ESGIC) -40 mg per tablet 1 tablet, 1 tablet, Oral, Q4H PRN, Swathi Merino MD, 1 tablet at 06/29/22 0156    citalopram (CeleXA) tablet 40 mg, 40 mg, Oral, Daily, Debi House PA-C, 40 mg at 06/29/22 0835    clopidogrel (PLAVIX) tablet 75 mg, 75 mg, Oral, Daily, Debi House PA-C, 75 mg at 06/29/22 0835    heparin (porcine) subcutaneous injection 5,000 Units, 5,000 Units, Subcutaneous, Q8H [address], Jeremy Valdez MD, 5,000 Units at 06/29/22 1558    HYDROcodone-acetaminophen (NORCO) 5-325 mg per tablet 1 tablet, 1 tablet, Oral, Q6H PRN, Brie Fitch DO, 1 tablet at 06/29/22 0604    insulin glargine (LANTUS) subcutaneous injection 30 Units 0 3 mL, 30 Units, Subcutaneous, Q12H [address], Brie Fitch DO, 30 Units at 06/29/22 0835    insulin lispro (HumaLOG) 100 units/mL subcutaneous injection 1-6 Units, 1-6 Units, Subcutaneous, 4x Daily (AC & HS), 1 Units at 06/29/22 1206 **AND** Fingerstick Glucose (POCT), , , 4x Daily AC and at bedtime, Debi House PA-C    insulin lispro (HumaLOG) 100 units/mL subcutaneous injection 10 Units, 10 Units, Subcutaneous, TID With Meals, Aryan Estevez DO, 10 Units at 06/29/22 1717    isosorbide mononitrate (IMDUR) 24 hr tablet 60 mg, 60 mg, Oral, Daily, Debi House PA-C, 60 mg at 06/29/22 0835    levothyroxine tablet 50 mcg, 50 mcg, Oral, Early Morning, Debi House PA-C, 50 mcg at 06/29/22 0603    naloxone [facility]) injection 2 mg, 2 mg, Intravenous, Once, Aryan Estevez DO    OLANZapine (ZyPREXA) tablet 5 mg, 5 mg, Oral, HS, Debi House PA-C, 5 mg at 06/28/22 2138    ondansetron (ZOFRAN) injection 4 mg, 4 mg, Intravenous, Q6H PRN, Debi House PA-C, 4 mg at 06/28/22 6668    perflutren lipid microsphere (DEFINITY) injection, 0 2 mL/min, Intravenous, Once in imaging, Ather MD William    senna-docusate sodium (SENOKOT S) 8 6-50 mg per tablet 1 tablet, 1 tablet, Oral, BID, Daryl Mayfield DO, 1 tablet at 06/29/22 1717    sorbitol 70 % solution 30 mL, 30 mL, Oral, Daily, Daryl Mayfield DO, 30 mL at 06/29/22 8979 ALLERGIES:  Allergies   Allergen Reactions    Codeine      Other reaction(s): Nausea and Vomiting    Latex Itching        *-*-*-*-*-*-*-*-*-*-*-*-*-*-*-*-*-*-*-*-*-*-*-*-*-*-*-*-*-*-*-*-*-*-*-*-*-*-*-*-*-*-*-*-*-*-*-*-*-*-*-*-*-*  LABORATORY DATA:  I have personally reviewed pertinent labs      CMP:  Results from last 7 days   Lab Units 06/28/22  0609 06/27/22  0606 06/26/22  0644   SODIUM mmol/L 139 140 137   POTASSIUM mmol/L 4 2 4 3 5 0   CHLORIDE mmol/L 106 107 105   CO2 mmol/L 24 24 24   BUN mg/dL 63* 72* 82*   CREATININE mg/dL 2 15* 2 66* 3 22*   CALCIUM mg/dL 8 2* 8 4 8 1*        Results from last 7 days   Lab Units 06/28/22  0609 06/26/22  0644   MAGNESIUM mg/dL 2 6 3 1*     Results from last 7 days   Lab Units 06/26/22  0644   PHOSPHORUS mg/dL 5 1*    Cardiac Profile: Invalid input(s): CK, CKMBP             CBC:   Results from last 7 days   Lab Units 22  0609 22  0644 22  0552   WBC Thousand/uL 9 83 10 96* 10 77*   HEMOGLOBIN g/dL 8 1* 7 9* 8 5*   HEMATOCRIT % 25 5* 23 8* 26 0*   HEMATOCRIT  % 23 6*  --   --    PLATELETS Thousands/uL 319 296 298     PT/INR: No results found for: PT, INR, Microbiology:          *-*-*-*-*-*-*-*-*-*-*-*-*-*-*-*-*-*-*-*-*-*-*-*-*-*-*-*-*-*-*-*-*-*-*-*-*-*-*-*-*-*-*-*-*-*-*-*-*-*-*-*-*-*-  IMAGING STUDIES REPORTS: Imaging studies results reviewed    No valid procedures specified  XR chest 2 views    Result Date: 2/10/2022  Impression Possible trace left pleural effusion  No consolidation or edema  Workstation performed: [id number]       *-*-*-*-*-*-*-*-*-*-*-*-*-*-*-*-*-*-*-*-*-*-*-*-*-*-*-*-*-*-*-*-*-*-*-*-*-*-*-*-*-*-*-*-*-*-*-*-*-*-*-*-*-*-    Results for orders placed during the hospital encounter of 21    Echo complete with contrast if indicated    Narrative  [address] [place] [address]    Transthoracic Echocardiogram  2D, M-mode, Doppler, and Color Doppler    Study date:  24-Aug-2021    Patient: Richard Plaza  MR number: [id number]  Account number: [de-identified]  : 1960  Age: 64 years  Gender: Female  Status: Inpatient  Location: [facility]  Height: 68 in  Weight: 312 4 lb  BP: 149/ 79 mmHg    Indications: Heart failure    Diagnoses: I50 9 - Heart failure, unspecified    Sonographer:  CASANDRA Kennedy  Interpreting Physician:  NICOLE Llanos  Primary Physician:  Tiffany Vance MD  Referring Physician:  CHERELLE Ellis  Group:  [facility]    SUMMARY    LEFT VENTRICLE:  Systolic function was mildly reduced by visual assessment  Ejection fraction was estimated to be 45 %  There were regional wall motion abnormalities that suggest coronary artery disease    There was severe hypokinesis of the basal-mid anteroseptal and basal-mid inferoseptal wall(s)  There was moderate hypokinesis of the basal-mid inferior wall(s)  Wall thickness was mildly increased  There was mild concentric hypertrophy  Features were consistent with grade 2 diastolic dysfunction  Doppler parameters were consistent with high ventricular filling pressure  E/E' was > 19    VENTRICULAR SEPTUM:  There was dyssynergic motion  These changes are consistent with LBBB  MITRAL VALVE:  There was mild "progressive" mitral stenosis  Mean gradient of 5 mmHg at 73 bpm  MVA by Doppler continuity equation is 2 15 cm2  TRICUSPID VALVE:  There was mild regurgitation  PULMONIC VALVE:  There was trace regurgitation  PERICARDIUM:  A trace pericardial effusion was identified  HISTORY: PRIOR HISTORY: CAD, CKD Risk factors: hypertension and diabetes  PROCEDURE: The study was performed in the [place] [address]  This was a routine study  The transthoracic approach was used  The study included complete 2D imaging, M-mode, complete spectral Doppler, and color Doppler  The heart rate was 76  bpm, at the start of the study  Images were obtained from the parasternal, apical, subcostal, and suprasternal notch acoustic windows  Image quality was adequate  LEFT VENTRICLE: Size was normal  Systolic function was mildly reduced by visual assessment  Ejection fraction was estimated to be 45 %  There were regional wall motion abnormalities that suggest coronary artery disease  There was severe hypokinesis of the basal-mid anteroseptal and basal-mid inferoseptal wall(s)  There was moderate hypokinesis of the basal-mid inferior wall(s)  Wall thickness was mildly increased  There was mild concentric hypertrophy  DOPPLER: Features were consistent with grade 2 diastolic dysfunction  Doppler parameters were consistent with high ventricular filling pressure  E/E' was > 19    VENTRICULAR SEPTUM: There was dyssynergic motion   These changes are consistent with LBBB     RIGHT VENTRICLE: The size was normal  Systolic function was normal  Wall thickness was normal     LEFT ATRIUM: Size was within the limits of normal when indexed for body surface area  LA volume index 31 ml/m2  RIGHT ATRIUM: Size was normal     MITRAL VALVE: Valve structure was normal  There was mild thickening  There was normal leaflet separation  There was mildly restricted mobility of the anterior leaflet  DOPPLER: The transmitral velocity was within the normal range  There  was mild "progressive" mitral stenosis  Mean gradient of 5 mmHg at 73 bpm  MVA by Doppler continuity equation is 2 15 cm2  There was no regurgitation  AORTIC VALVE: The valve was trileaflet  Leaflets exhibited normal thickness and normal cuspal separation  DOPPLER: Transaortic velocity was within the normal range  There was no evidence for stenosis  There was no regurgitation  TRICUSPID VALVE: The valve structure was normal  There was normal leaflet separation  DOPPLER: The transtricuspid velocity was within the normal range  There was no evidence for stenosis  There was mild regurgitation  Estimated peak PA  pressure was 34 mmHg  PULMONIC VALVE: Not well visualized  DOPPLER: There was no evidence for stenosis  There was trace regurgitation  PERICARDIUM: A trace pericardial effusion was identified  The pericardium was normal in appearance  AORTA: The root exhibited normal size  SYSTEMIC VEINS: IVC: The inferior vena cava was normal in size and course   Respirophasic changes were normal     SYSTEM MEASUREMENT TABLES    2D  %FS: 25 15 %  Ao Diam: 2 77 cm  EDV(Teich): 136 76 ml  EF(Teich): 49 26 %  ESV(Teich): 69 39 ml  IVSd: 1 07 cm  LA Area: 30 26 cm2  LA Diam: 4 91 cm  LVEDV MOD A4C: 210 21 ml  LVEF MOD A4C: 46 53 %  LVESV MOD A4C: 112 41 ml  LVIDd: 5 32 cm  LVIDs: 3 99 cm  LVLd A4C: 9 67 cm  LVLs A4C: 8 21 cm  LVPWd: 1 1 cm  RA Area: 18 38 cm2  RVIDd: 3 28 cm  SV MOD A4C: 97 8 ml  SV(Teich): 67 38 ml    CW  TR Vmax: 2 78 m/s  TR maxP 99 mmHg    MM  TAPSE: 2 42 cm    PW  E' Sept: 0 06 m/s  E/E' Sept: 19 84  MV A [place]: 1 49 m/s  MV Dec [place]: 9 08 m/s2  MV DecT: 137 82 ms  MV E Santosh: 1 25 m/s  MV E/A Ratio: 0 84  MV PHT: 39 97 ms  MVA By PHT: 5 5 cm2    [facility] Accredited Echocardi[facility]    Prepared and electronically signed by    NICOLE Leija  Signed 24-Aug-2021 14:54:43    No results found for this or any previous visit  No results found for this or any previous visit  No results found for this or any previous visit         *-*-*-*-*-*-*-*-*-*-*-*-*-*-*-*-*-*-*-*-*-*-*-*-*-*-*-*-*-*-*-*-*-*-*-*-*-*-*-*-*-*-*-*-*-*-*-*-*-*-*-*-*-*-  SIGNATURES:   [unfilled]   Niac Fontana MD

## 2022-06-29 NOTE — PLAN OF CARE
Problem: MOBILITY - ADULT  Goal: Maintain or return to baseline ADL function  Description: INTERVENTIONS:  -  Assess patient's ability to carry out ADLs; assess patient's baseline for ADL function and identify physical deficits which impact ability to perform ADLs (bathing, care of mouth/teeth, toileting, grooming, dressing, etc )  - Assess/evaluate cause of self-care deficits   - Assess range of motion  - Assess patient's mobility; develop plan if impaired  - Assess patient's need for assistive devices and provide as appropriate  - Encourage maximum independence but intervene and supervise when necessary  - Involve family in performance of ADLs  - Assess for home care needs following discharge   - Consider OT consult to assist with ADL evaluation and planning for discharge  - Provide patient education as appropriate  Outcome: Progressing  Goal: Maintains/Returns to pre admission functional level  Description: INTERVENTIONS:  - Perform BMAT or MOVE assessment daily    - Set and communicate daily mobility goal to care team and patient/family/caregiver  - Collaborate with rehabilitation services on mobility goals if consulted  - Perform Range of Motion 3 times a day  - Reposition patient every 3 hours  - Dangle patient 3 times a day  - Stand patient 3 times a day  - Ambulate patient 3 times a day  - Out of bed to chair 3 times a day   - Out of bed for meals 3 times a day  - Out of bed for toileting  - Record patient progress and toleration of activity level   Outcome: Progressing     Problem: Nutrition/Hydration-ADULT  Goal: Nutrient/Hydration intake appropriate for improving, restoring or maintaining nutritional needs  Description: Monitor and assess patient's nutrition/hydration status for malnutrition  Collaborate with interdisciplinary team and initiate plan and interventions as ordered  Monitor patient's weight and dietary intake as ordered or per policy   Utilize nutrition screening tool and intervene as necessary  Determine patient's food preferences and provide high-protein, high-caloric foods as appropriate       INTERVENTIONS:  - Monitor oral intake, urinary output, labs, and treatment plans  - Assess nutrition and hydration status and recommend course of action  - Evaluate amount of meals eaten  - Assist patient with eating if necessary   - Allow adequate time for meals  - Recommend/ encourage appropriate diets, oral nutritional supplements, and vitamin/mineral supplements  - Order, calculate, and assess calorie counts as needed  - Recommend, monitor, and adjust tube feedings and TPN/PPN based on assessed needs  - Assess need for intravenous fluids  - Provide specific nutrition/hydration education as appropriate  - Include patient/family/caregiver in decisions related to nutrition  Outcome: Progressing     Problem: Potential for Falls  Goal: Patient will remain free of falls  Description: INTERVENTIONS:  - Educate patient/family on patient safety including physical limitations  - Instruct patient to call for assistance with activity   - Consult OT/PT to assist with strengthening/mobility   - Keep Call bell within reach  - Keep bed low and locked with side rails adjusted as appropriate  - Keep care items and personal belongings within reach  - Initiate and maintain comfort rounds  - Make Fall Risk Sign visible to staff  - Offer Toileting every 2 Hours, in advance of need  - Initiate/Maintain bed alarm  - Obtain necessary fall risk management equipment:   - Apply yellow socks and bracelet for high fall risk patients  - Consider moving patient to room near nurses station  Outcome: Progressing     Problem: INFECTION - ADULT  Goal: Absence or prevention of progression during hospitalization  Description: INTERVENTIONS:  - Assess and monitor for signs and symptoms of infection  - Monitor lab/diagnostic results  - Monitor all insertion sites, i e  indwelling lines, tubes, and drains  - Monitor endotracheal if appropriate and nasal secretions for changes in amount and color  - [place] appropriate cooling/warming therapies per order  - Administer medications as ordered  - Instruct and encourage patient and family to use good hand hygiene technique  - Identify and instruct in appropriate isolation precautions for identified infection/condition  Outcome: Progressing     Problem: GENITOURINARY - ADULT  Goal: Urinary catheter remains patent  Description: INTERVENTIONS:  - Assess patency of urinary catheter  - If patient has a chronic morales, consider changing catheter if non-functioning  - Follow guidelines for intermittent irrigation of non-functioning urinary catheter  Outcome: Progressing     Problem: METABOLIC, FLUID AND ELECTROLYTES - ADULT  Goal: Fluid balance maintained  Description: INTERVENTIONS:  - Monitor labs   - Monitor I/O and WT  - Instruct patient on fluid and nutrition as appropriate  - Assess for signs & symptoms of volume excess or deficit  Outcome: Progressing  Goal: Glucose maintained within target range  Description: INTERVENTIONS:  - Monitor Blood Glucose as ordered  - Assess for signs and symptoms of hyperglycemia and hypoglycemia  - Administer ordered medications to maintain glucose within target range  - Assess nutritional intake and initiate nutrition service referral as needed  Outcome: Progressing     Problem: HEMATOLOGIC - ADULT  Goal: Maintains hematologic stability  Description: INTERVENTIONS  - Assess for signs and symptoms of bleeding or hemorrhage  - Monitor labs  - Administer supportive blood products/factors as ordered and appropriate  Outcome: Progressing     Problem: DISCHARGE PLANNING  Goal: Discharge to home or other facility with appropriate resources  Description: INTERVENTIONS:  - Identify barriers to discharge w/patient and caregiver  - Arrange for needed discharge resources and transportation as appropriate  - Identify discharge learning needs (meds, wound care, etc )  - Arrange for interpretive services to assist at discharge as needed  - Refer to Case Management Department for coordinating discharge planning if the patient needs post-hospital services based on physician/advanced practitioner order or complex needs related to functional status, cognitive ability, or social support system  Outcome: Progressing     Problem: Knowledge Deficit  Goal: Patient/family/caregiver demonstrates understanding of disease process, treatment plan, medications, and discharge instructions  Description: Complete learning assessment and assess knowledge base    Interventions:  - Provide teaching at level of understanding  - Provide teaching via preferred learning methods  Outcome: Progressing

## 2022-06-30 ENCOUNTER — PREP FOR PROCEDURE (OUTPATIENT)
Dept: CARDIOLOGY CLINIC | Facility: CLINIC | Age: 62
End: 2022-06-30

## 2022-06-30 DIAGNOSIS — I42.9 CARDIOMYOPATHY, UNSPECIFIED TYPE (HCC): Primary | ICD-10-CM

## 2022-06-30 LAB
ANION GAP SERPL CALCULATED.3IONS-SCNC: 11 MMOL/L (ref 4–13)
BASOPHILS # BLD AUTO: 0.05 THOUSANDS/ΜL (ref 0–0.1)
BASOPHILS NFR BLD AUTO: 1 % (ref 0–1)
BUN SERPL-MCNC: 55 MG/DL (ref 5–25)
CALCIUM SERPL-MCNC: 8.3 MG/DL (ref 8.3–10.1)
CHLORIDE SERPL-SCNC: 106 MMOL/L (ref 100–108)
CO2 SERPL-SCNC: 26 MMOL/L (ref 21–32)
CREAT SERPL-MCNC: 2.16 MG/DL (ref 0.6–1.3)
EOSINOPHIL # BLD AUTO: 0.33 THOUSAND/ΜL (ref 0–0.61)
EOSINOPHIL NFR BLD AUTO: 3 % (ref 0–6)
ERYTHROCYTE [DISTWIDTH] IN BLOOD BY AUTOMATED COUNT: 15.7 % (ref 11.6–15.1)
GFR SERPL CREATININE-BSD FRML MDRD: 24 ML/MIN/1.73SQ M
GLUCOSE SERPL-MCNC: 125 MG/DL (ref 65–140)
GLUCOSE SERPL-MCNC: 194 MG/DL (ref 65–140)
GLUCOSE SERPL-MCNC: 53 MG/DL (ref 65–140)
GLUCOSE SERPL-MCNC: 58 MG/DL (ref 65–140)
GLUCOSE SERPL-MCNC: 97 MG/DL (ref 65–140)
HCT VFR BLD AUTO: 26.3 % (ref 34.8–46.1)
HGB BLD-MCNC: 8.1 G/DL (ref 11.5–15.4)
IMM GRANULOCYTES # BLD AUTO: 0.05 THOUSAND/UL (ref 0–0.2)
IMM GRANULOCYTES NFR BLD AUTO: 1 % (ref 0–2)
LYMPHOCYTES # BLD AUTO: 2.62 THOUSANDS/ΜL (ref 0.6–4.47)
LYMPHOCYTES NFR BLD AUTO: 26 % (ref 14–44)
MCH RBC QN AUTO: 31 PG (ref 26.8–34.3)
MCHC RBC AUTO-ENTMCNC: 30.8 G/DL (ref 31.4–37.4)
MCV RBC AUTO: 101 FL (ref 82–98)
MONOCYTES # BLD AUTO: 0.89 THOUSAND/ΜL (ref 0.17–1.22)
MONOCYTES NFR BLD AUTO: 9 % (ref 4–12)
NEUTROPHILS # BLD AUTO: 6.12 THOUSANDS/ΜL (ref 1.85–7.62)
NEUTS SEG NFR BLD AUTO: 60 % (ref 43–75)
NRBC BLD AUTO-RTO: 0 /100 WBCS
PLATELET # BLD AUTO: 310 THOUSANDS/UL (ref 149–390)
PMV BLD AUTO: 9.2 FL (ref 8.9–12.7)
POTASSIUM SERPL-SCNC: 3.7 MMOL/L (ref 3.5–5.3)
RBC # BLD AUTO: 2.61 MILLION/UL (ref 3.81–5.12)
SODIUM SERPL-SCNC: 143 MMOL/L (ref 136–145)
WBC # BLD AUTO: 10.06 THOUSAND/UL (ref 4.31–10.16)

## 2022-06-30 PROCEDURE — 99232 SBSQ HOSP IP/OBS MODERATE 35: CPT | Performed by: INTERNAL MEDICINE

## 2022-06-30 PROCEDURE — 82948 REAGENT STRIP/BLOOD GLUCOSE: CPT

## 2022-06-30 PROCEDURE — 80048 BASIC METABOLIC PNL TOTAL CA: CPT | Performed by: INTERNAL MEDICINE

## 2022-06-30 PROCEDURE — 97530 THERAPEUTIC ACTIVITIES: CPT

## 2022-06-30 PROCEDURE — 85025 COMPLETE CBC W/AUTO DIFF WBC: CPT | Performed by: INTERNAL MEDICINE

## 2022-06-30 PROCEDURE — 97535 SELF CARE MNGMENT TRAINING: CPT

## 2022-06-30 PROCEDURE — 97116 GAIT TRAINING THERAPY: CPT

## 2022-06-30 RX ORDER — SODIUM CHLORIDE 9 MG/ML
75 INJECTION, SOLUTION INTRAVENOUS CONTINUOUS
Status: DISCONTINUED | OUTPATIENT
Start: 2022-07-01 | End: 2022-07-01 | Stop reason: SDUPTHER

## 2022-06-30 RX ADMIN — ASPIRIN 81 MG CHEWABLE TABLET 81 MG: 81 TABLET CHEWABLE at 08:58

## 2022-06-30 RX ADMIN — LEVOTHYROXINE SODIUM 50 MCG: 50 TABLET ORAL at 05:39

## 2022-06-30 RX ADMIN — CLOPIDOGREL BISULFATE 75 MG: 75 TABLET ORAL at 09:00

## 2022-06-30 RX ADMIN — INSULIN LISPRO 10 UNITS: 100 INJECTION, SOLUTION INTRAVENOUS; SUBCUTANEOUS at 12:29

## 2022-06-30 RX ADMIN — ISOSORBIDE MONONITRATE 60 MG: 60 TABLET, EXTENDED RELEASE ORAL at 09:00

## 2022-06-30 RX ADMIN — HEPARIN SODIUM 5000 UNITS: 5000 INJECTION INTRAVENOUS; SUBCUTANEOUS at 15:41

## 2022-06-30 RX ADMIN — INSULIN GLARGINE 30 UNITS: 100 INJECTION, SOLUTION SUBCUTANEOUS at 21:40

## 2022-06-30 RX ADMIN — ATORVASTATIN CALCIUM 40 MG: 40 TABLET, FILM COATED ORAL at 16:57

## 2022-06-30 RX ADMIN — INSULIN LISPRO 10 UNITS: 100 INJECTION, SOLUTION INTRAVENOUS; SUBCUTANEOUS at 16:56

## 2022-06-30 RX ADMIN — OLANZAPINE 5 MG: 5 TABLET, FILM COATED ORAL at 21:40

## 2022-06-30 RX ADMIN — HYDROCODONE BITARTRATE AND ACETAMINOPHEN 1 TABLET: 5; 325 TABLET ORAL at 02:05

## 2022-06-30 RX ADMIN — SORBITOL SOLUTION (BULK) 30 ML: 70 SOLUTION at 09:06

## 2022-06-30 RX ADMIN — HEPARIN SODIUM 5000 UNITS: 5000 INJECTION INTRAVENOUS; SUBCUTANEOUS at 21:40

## 2022-06-30 RX ADMIN — INSULIN LISPRO 5 UNITS: 100 INJECTION, SOLUTION INTRAVENOUS; SUBCUTANEOUS at 12:29

## 2022-06-30 RX ADMIN — BUTALBITAL, ACETAMINOPHEN AND CAFFEINE 1 TABLET: 50; 325; 40 TABLET ORAL at 09:06

## 2022-06-30 RX ADMIN — AMLODIPINE BESYLATE 10 MG: 10 TABLET ORAL at 09:00

## 2022-06-30 RX ADMIN — INSULIN GLARGINE 30 UNITS: 100 INJECTION, SOLUTION SUBCUTANEOUS at 09:06

## 2022-06-30 RX ADMIN — HEPARIN SODIUM 5000 UNITS: 5000 INJECTION INTRAVENOUS; SUBCUTANEOUS at 05:39

## 2022-06-30 RX ADMIN — ALLOPURINOL 300 MG: 300 TABLET ORAL at 09:00

## 2022-06-30 RX ADMIN — HYDROCODONE BITARTRATE AND ACETAMINOPHEN 1 TABLET: 5; 325 TABLET ORAL at 15:50

## 2022-06-30 RX ADMIN — CITALOPRAM HYDROBROMIDE 40 MG: 20 TABLET ORAL at 09:00

## 2022-06-30 RX ADMIN — BISOPROLOL FUMARATE 2.5 MG: 5 TABLET ORAL at 08:59

## 2022-06-30 RX ADMIN — ALUMINUM HYDROXIDE, MAGNESIUM HYDROXIDE, AND SIMETHICONE 30 ML: 200; 200; 20 SUSPENSION ORAL at 15:50

## 2022-06-30 NOTE — OCCUPATIONAL THERAPY NOTE
Occupational Therapy Progress Note     Patient Name: Ula Cogan TEITN'L Date: 6/30/2022  Problem List  Principal Problem:    CAD (coronary artery disease)  Active Problems:    Type 2 diabetes mellitus with stage 4 chronic kidney disease, with long-term current use of insulin (HCC)    Anemia    Chronic combined systolic and diastolic CHF (congestive heart failure) (HCC)    Syncope    Acute renal failure superimposed on chronic kidney disease (HCC)    Elevated troponin I level    Hypotension    Gastroenteritis    Hyponatremia    History of anemia due to chronic kidney disease    Bradycardia    Headache          06/30/22 1326   OT Last Visit   OT Visit Date 06/30/22   Restrictions/Precautions   Weight Bearing Precautions Per Order No   Other Precautions Pain; Fall Risk;Telemetry   Pain Assessment   Pain Assessment Tool 0-10   Pain Score 5   Pain Location/Orientation Orientation: Bilateral;Location: Generalized   Hospital Pain Intervention(s) Ambulation/increased activity;Repositioned; Emotional support   ADL   Where Assessed Chair   Grooming Assistance 5  Supervision/Setup   Grooming Deficit Setup;Verbal cueing;Supervision/safety; Increased time to complete   UB Bathing Assistance 5  Supervision/Setup   UB Bathing Deficit Setup;Supervision/safety; Increased time to complete   LB Dressing Assistance 5  Supervision/Setup   LB Dressing Deficit Don/doff L sock; Don/doff R sock   LB Dressing Comments don/doff socks while seated EOB   Transfers   Sit to Stand 5  Supervision   Additional items Increased time required;Armrests   Stand to Sit 5  Supervision   Additional items Assist x 1; Increased time required;Verbal cues;Armrests   Functional Mobility   Functional Mobility 4  Minimal assistance   Additional Comments assist x1 w/ increased time to complete in homelike environment w/ RW   Additional items Rolling walker   Therapeutic Exercise - ROM   UE-ROM Yes   ROM- Right Upper Extremities   R Shoulder AROM;Flexion; Extension   R Elbow AROM;Elbow flexion;Elbow extension  (punchouts)   R Weight/Reps/Sets 2 sets x 10 reps   RUE ROM Comment tolerated well   ROM - Left Upper Extremities    L Shoulder AROM; Flexion; Extension   L Elbow AROM;Elbow flexion;Elbow extension  (punchouts)   L Weight/Reps/Sets 2 sets x 10 reps   LUE ROM Comment tolerated well   Cognition   Overall Cognitive Status WFL   Arousal/Participation Alert; Cooperative   Attention Within functional limits   Orientation Level Oriented X4   Memory Within functional limits   Following Commands Follows one step commands without difficulty   Comments engages in appropriate conversations   Additional Activities   Additional Activities   (education on [facility])   Additional Activities Comments cues to initiate   Activity Tolerance   Activity Tolerance Patient limited by fatigue   Medical Staff Made Aware appropriate to see per RNSuzy   Assessment   Assessment Pt seen for skilled OT session focused on ADLs, functional transfers and mobility, energy conservation education and UE exercises  Pt seated EOB upon arrival and agreeable to participate  Pt setup with increased time to don/doff socks  Pt supervision sit to stand from bed and min assist functional mobility with RW to bathroom and supervision transfer at sink to complete grooming task and UB bathing with setup  Pt supervision sit to stand from chair and min assist functional mobility in homelike environment with RW  Pt completed b/l UE exercises seen above to increase strength and endurance for ADLs and functional tasks  Pt completed 2 sets x5reps sit to stand exercise with rest breaks needed  Pt with dyspnea and fatigue notes after tasks cues for proper breathing  Pt upright at end of session and all needs met    Pt continues to be limited due to decreased strength and endurance, impaired balance, inspired activity tolerance, fall risk, multiple lines increased pain, dyspnea all causing a decline in ADLs functional transfers and mobility  recommended STR vs home with home OT and family support pending progress  The patient's raw score on the AM-PAC Daily Activity inpatient short form is 18, standardized score is 38 66, less than 39 4  Patients at this level are likely to benefit from discharge to post-acute rehabilitation services  Please refer to the recommendation of the Occupational Therapist for safe discharge planning  Plan   Treatment Interventions ADL retraining;Functional transfer training; Endurance training;Cognitive reorientation;Patient/family training;UE strengthening/ROM; Equipment evaluation/education; Compensatory technique education; Activityengagement; Energy conservation   Goal Expiration Date 07/09/22   OT Treatment Day 1   OT Frequency 3-5x/wk   Recommendation   OT Discharge Recommendation Post acute rehabilitation services  (vs  HOME OT w/ family support)   OT - OK to Discharge Yes   AM-PAC Daily Activity Inpatient   Lower Body Dressing 3   Bathing 2   Toileting 3   Upper Body Dressing 3   Grooming 3   Eating 4   Daily Activity Raw Score 18   Daily Activity Standardized Score (Calc for Raw Score >=11) 38 66   AM-Located within [facility] Applied Cognition Inpatient   Following a Speech/Presentation 4   Understanding Ordinary Conversation 4   Taking Medications 4   Remembering Where Things Are Placed or Put Away 4   Remembering List of 4-5 Errands 3   Taking Care of Complicated Tasks 4   Applied Cognition Raw Score 23   Applied Cognition Standardized Score 53 08     Documentation completed by:  Tosin Gomez

## 2022-06-30 NOTE — PLAN OF CARE
Problem: MOBILITY - ADULT  Goal: Maintain or return to baseline ADL function  Description: INTERVENTIONS:  -  Assess patient's ability to carry out ADLs; assess patient's baseline for ADL function and identify physical deficits which impact ability to perform ADLs (bathing, care of mouth/teeth, toileting, grooming, dressing, etc )  - Assess/evaluate cause of self-care deficits   - Assess range of motion  - Assess patient's mobility; develop plan if impaired  - Assess patient's need for assistive devices and provide as appropriate  - Encourage maximum independence but intervene and supervise when necessary  - Involve family in performance of ADLs  - Assess for home care needs following discharge   - Consider OT consult to assist with ADL evaluation and planning for discharge  - Provide patient education as appropriate  Outcome: Progressing  Goal: Maintains/Returns to pre admission functional level  Description: INTERVENTIONS:  - Perform BMAT or MOVE assessment daily    - Set and communicate daily mobility goal to care team and patient/family/caregiver  - Collaborate with rehabilitation services on mobility goals if consulted  - Perform Range of Motion  times a day  - Reposition patient every  hours  - Dangle patient  times a day  - Stand patient  times a day  - Ambulate patient  times a day  - Out of bed to chair  times a day   - Out of bed for meals  times a day  - Out of bed for toileting  - Record patient progress and toleration of activity level   Outcome: Progressing     Problem: Nutrition/Hydration-ADULT  Goal: Nutrient/Hydration intake appropriate for improving, restoring or maintaining nutritional needs  Description: Monitor and assess patient's nutrition/hydration status for malnutrition  Collaborate with interdisciplinary team and initiate plan and interventions as ordered  Monitor patient's weight and dietary intake as ordered or per policy   Utilize nutrition screening tool and intervene as necessary  Determine patient's food preferences and provide high-protein, high-caloric foods as appropriate       INTERVENTIONS:  - Monitor oral intake, urinary output, labs, and treatment plans  - Assess nutrition and hydration status and recommend course of action  - Evaluate amount of meals eaten  - Assist patient with eating if necessary   - Allow adequate time for meals  - Recommend/ encourage appropriate diets, oral nutritional supplements, and vitamin/mineral supplements  - Order, calculate, and assess calorie counts as needed  - Recommend, monitor, and adjust tube feedings and TPN/PPN based on assessed needs  - Assess need for intravenous fluids  - Provide specific nutrition/hydration education as appropriate  - Include patient/family/caregiver in decisions related to nutrition  Outcome: Progressing     Problem: Potential for Falls  Goal: Patient will remain free of falls  Description: INTERVENTIONS:  - Educate patient/family on patient safety including physical limitations  - Instruct patient to call for assistance with activity   - Consult OT/PT to assist with strengthening/mobility   - Keep Call bell within reach  - Keep bed low and locked with side rails adjusted as appropriate  - Keep care items and personal belongings within reach  - Initiate and maintain comfort rounds  - Make Fall Risk Sign visible to staff  - Offer Toileting every  Hours, in advance of need  - Initiate/Maintain alarm  - Obtain necessary fall risk management equipment  - Apply yellow socks and bracelet for high fall risk patients  - Consider moving patient to room near nurses station  Outcome: Progressing     Problem: INFECTION - ADULT  Goal: Absence or prevention of progression during hospitalization  Description: INTERVENTIONS:  - Assess and monitor for signs and symptoms of infection  - Monitor lab/diagnostic results  - Monitor all insertion sites, i e  indwelling lines, tubes, and drains  - Monitor endotracheal if appropriate and nasal secretions for changes in amount and color  - [place] appropriate cooling/warming therapies per order  - Administer medications as ordered  - Instruct and encourage patient and family to use good hand hygiene technique  - Identify and instruct in appropriate isolation precautions for identified infection/condition  Outcome: Progressing     Problem: DISCHARGE PLANNING  Goal: Discharge to home or other facility with appropriate resources  Description: INTERVENTIONS:  - Identify barriers to discharge w/patient and caregiver  - Arrange for needed discharge resources and transportation as appropriate  - Identify discharge learning needs (meds, wound care, etc )  - Arrange for interpretive services to assist at discharge as needed  - Refer to Case Management Department for coordinating discharge planning if the patient needs post-hospital services based on physician/advanced practitioner order or complex needs related to functional status, cognitive ability, or social support system  Outcome: Progressing     Problem: Knowledge Deficit  Goal: Patient/family/caregiver demonstrates understanding of disease process, treatment plan, medications, and discharge instructions  Description: Complete learning assessment and assess knowledge base    Interventions:  - Provide teaching at level of understanding  - Provide teaching via preferred learning methods  Outcome: Progressing     Problem: GENITOURINARY - ADULT  Goal: Urinary catheter remains patent  Description: INTERVENTIONS:  - Assess patency of urinary catheter  - If patient has a chronic morales, consider changing catheter if non-functioning  - Follow guidelines for intermittent irrigation of non-functioning urinary catheter  Outcome: Progressing     Problem: METABOLIC, FLUID AND ELECTROLYTES - ADULT  Goal: Fluid balance maintained  Description: INTERVENTIONS:  - Monitor labs   - Monitor I/O and WT  - Instruct patient on fluid and nutrition as appropriate  - Assess for signs & symptoms of volume excess or deficit  Outcome: Progressing  Goal: Glucose maintained within target range  Description: INTERVENTIONS:  - Monitor Blood Glucose as ordered  - Assess for signs and symptoms of hyperglycemia and hypoglycemia  - Administer ordered medications to maintain glucose within target range  - Assess nutritional intake and initiate nutrition service referral as needed  Outcome: Progressing     Problem: HEMATOLOGIC - ADULT  Goal: Maintains hematologic stability  Description: INTERVENTIONS  - Assess for signs and symptoms of bleeding or hemorrhage  - Monitor labs  - Administer supportive blood products/factors as ordered and appropriate  Outcome: Progressing

## 2022-06-30 NOTE — PROGRESS NOTES
Cardiology         Progress Note - Cardiology   Max Mitchell 63 y o  female MRN: [id number]  Unit/Bed#: E4 -01 Encounter: [id number]          Assessment/Recommendations/Discussion:   1  Coronary artery disease with history of multiple PCIs in the past with chronic total occlusion of RCA, now with unspecified chest pain  (cardiac catheterization August 2021 showed old mild left main disease, 30% proximal LAD stenosis at site of prior stent, patent mid and distal LAD stents, patent proximal circumflex stent and non dominant RCA with chronic total occlusion)  2  Acute change in left ventricular function with EF down to 30% with new regional wall motion abnormalities  3  Acute renal failure superimposed on chronic kidney disease, improved  4  Recent acute enteritis  5  Ischemic cardiomyopathy with moderate to markedly reduced left ventricular systolic function  6  Dyslipidemia  7  Anemia of chronic kidney disease  8  Diabetes mellitus  9  Polycystic ovarian syndrome  10  Fibromyalgia  11  Neurogenic bladder with suprapubic catheter in place  12  Uncorrected obstructive sleep apnea  13  Mild pulmonary hypertension      · D/W nephrology  Renal fx optimized for cath  Will schedule for tomorrow  IVF before and after cath  Pt understands potential risks, but agrees should rule out worsening CAD in setting of prior LAD stenting, new regional WMA, and reduced LVEF  · Continue DAPT, statin, isosorbide, amlodipine        Subjective: Pt seen/examined    Feels well, better today, no complaints of CP, SOB          Physical Exam:  GEN:  NAD  HEENT:  MMM, NCAT, pink conjunctiva, EOMI, nonicteric sclera  CV:  NO JVD/HJR, RR, NO M/R/G, +S1/S2, NO PARASTERNAL HEAVE/THRILL, NO LE EDEMA, NO HEPATIC SYSTOLIC PULSATION, WARM EXTREMITIES  RESP:  CTAB/L  ABD:  SOFT, NT, NO GROSS ORGANOMEGALY        Vitals:   /64 (BP Location: Right arm)   Pulse 59   Temp (!) 96 °F (35 6 °C) (Temporal)   Resp 18   Ht 5' 8" (1 727 m)   Wt 126 kg (277 lb 1 9 oz)   SpO2 97%   BMI 42 14 kg/m²   Vitals:    06/29/22 1412 06/30/22 0600   Weight: 126 kg (278 lb) 126 kg (277 lb 1 9 oz)       Intake/Output Summary (Last 24 hours) at 6/30/2022 1407  Last data filed at 6/30/2022 0601  Gross per 24 hour   Intake --   Output 1125 ml   Net -1125 ml         Lab Results:  Results from last 7 days   Lab Units 06/30/22  0607   WBC Thousand/uL 10 06   HEMOGLOBIN g/dL 8 1*   HEMATOCRIT % 26 3*   PLATELETS Thousands/uL 310     Results from last 7 days   Lab Units 06/30/22  0607   POTASSIUM mmol/L 3 7   CHLORIDE mmol/L 106   CO2 mmol/L 26   BUN mg/dL 55*   CREATININE mg/dL 2 16*   CALCIUM mg/dL 8 3     Results from last 7 days   Lab Units 06/30/22  0607   POTASSIUM mmol/L 3 7   CHLORIDE mmol/L 106   CO2 mmol/L 26   BUN mg/dL 55*   CREATININE mg/dL 2 16*   CALCIUM mg/dL 8 3           Medications:    Current Facility-Administered Medications:     acetaminophen (TYLENOL) tablet 650 mg, 650 mg, Oral, Q6H PRN, Arleen Starkey MD, 650 mg at 06/28/22 0836    al mag oxide-diphenhydramine-lidocaine viscous (MAGIC MOUTHWASH) suspension 10 mL, 10 mL, Swish & Swallow, Q6H PRN, CHERELLE Grigsby, 10 mL at 06/25/22 2129    allopurinol (ZYLOPRIM) tablet 300 mg, 300 mg, Oral, Daily, Debi House PA-C, 300 mg at 06/30/22 0900    aluminum-magnesium hydroxide-simethicone (MYLANTA) oral suspension 30 mL, 30 mL, Oral, Q4H PRN, Daryl Mayfield DO    amLODIPine (NORVASC) tablet 10 mg, 10 mg, Oral, Daily, Delfina Thomas MD, 10 mg at 06/30/22 0900    aspirin chewable tablet 81 mg, 81 mg, Oral, Daily, Vaishali Kelley DO, 81 mg at 06/30/22 0858    atorvastatin (LIPITOR) tablet 40 mg, 40 mg, Oral, Daily With Cierra House PA-C, 40 mg at 06/29/22 1558    bisoprolol (ZEBETA) tablet 2 5 mg, 2 5 mg, Oral, Daily, Delfina Thomas MD, 2 5 mg at 06/30/22 0859    butalbital-acetaminophen-caffeine (FIORICET,ESGIC) -40 mg per tablet 1 tablet, 1 tablet, Oral, Q4H PRN, Christina Don MD, 1 tablet at 06/30/22 0906    citalopram (CeleXA) tablet 40 mg, 40 mg, Oral, Daily, Debi House PA-C, 40 mg at 06/30/22 0900    clopidogrel (PLAVIX) tablet 75 mg, 75 mg, Oral, Daily, Debi House PA-C, 75 mg at 06/30/22 0900    heparin (porcine) subcutaneous injection 5,000 Units, 5,000 Units, Subcutaneous, Q8H [address], Ras De León MD, 5,000 Units at 06/30/22 0539    HYDROcodone-acetaminophen (NORCO) 5-325 mg per tablet 1 tablet, 1 tablet, Oral, Q6H PRN, Dillon Pun, DO, 1 tablet at 06/30/22 0205    insulin glargine (LANTUS) subcutaneous injection 30 Units 0 3 mL, 30 Units, Subcutaneous, Q12H [address], Dillon Pun, DO, 30 Units at 06/30/22 0906    insulin lispro (HumaLOG) 100 units/mL subcutaneous injection 1-6 Units, 1-6 Units, Subcutaneous, 4x Daily (AC & HS), 5 Units at 06/30/22 1229 **AND** Fingerstick Glucose (POCT), , , 4x Daily AC and at bedtime, Debi House PA-C    insulin lispro (HumaLOG) 100 units/mL subcutaneous injection 10 Units, 10 Units, Subcutaneous, TID With Meals, Dillon Pun, DO, 10 Units at 06/30/22 1229    isosorbide mononitrate (IMDUR) 24 hr tablet 60 mg, 60 mg, Oral, Daily, Debi House PA-C, 60 mg at 06/30/22 0900    levothyroxine tablet 50 mcg, 50 mcg, Oral, Early Morning, Debi House PA-C, 50 mcg at 06/30/22 0539    naloxone [facility]) injection 2 mg, 2 mg, Intravenous, Once, Dillon Pun, DO    OLANZapine (ZyPREXA) tablet 5 mg, 5 mg, Oral, HS, Debi House PA-C, 5 mg at 06/29/22 2133    ondansetron (ZOFRAN) injection 4 mg, 4 mg, Intravenous, Q6H PRN, Debi House PA-C, 4 mg at 06/28/22 6504    perflutren lipid microsphere (DEFINITY) injection, 0 2 mL/min, Intravenous, Once in imaging, Ather MD William    senna-docusate sodium (SENOKOT S) 8 6-50 mg per tablet 1 tablet, 1 tablet, Oral, BID, Dillon Sandoval DO, 1 tablet at 06/29/22 1717    sorbitol 70 % solution 30 mL, 30 mL, Oral, Daily, [facility] DO Chaparro, 30 mL at 06/30/22 6450    This note was completed in part utilizing M-Modal Fluency Direct Software  Grammatical errors, random word insertions, spelling mistakes, and incomplete sentences may be an occasional consequence of this system secondary to software limitations, ambient noise, and hardware issues  If you have any questions or concerns about the content, text, or information contained within the body of this dictation, please contact the provider for clarification

## 2022-06-30 NOTE — CASE MANAGEMENT
Case Management Discharge Planning Note    Patient name Eric Hartmann  Location East 4 /E4 [address] 450-* MRN [id number]  : 1960 Date 2022       Current Admission Date: 2022  Current Admission Diagnosis:CAD (coronary artery disease)   Patient Active Problem List    Diagnosis Date Noted    Hyponatremia 2022    History of anemia due to chronic kidney disease 2022    Bradycardia 2022    Headache 2022    Syncope 2022    Acute renal failure superimposed on chronic kidney disease ([facility] [facility] ) 2022    Chest pain 2022    Elevated troponin I level 2022    Hypotension 2022    Gastroenteritis 2022    ALFRED (acute kidney injury) ([facility] [facility] ) 2022    Encounter for examination following treatment at hospital 2022    Other artificial openings of urinary tract status ([facility] [facility] ) 02/10/2022    Continuous opioid dependence ([facility] [facility] ) 02/10/2022    [facility]al nod[facility] 02/10/2022    Stable angina ([facility] ) 02/10/2022    Volume overload 02/10/2022    Acquired hypothyroidism 10/14/2021    Mixed hyperlipidemia 10/14/2021    Gastroesophageal reflux disease without esophagitis 10/13/2021    Other proteinuria 2021    Chronic combined systolic and diastolic CHF (congestive heart failure) ([facility] ) 2021    Prolonged QT interval 2021    Urinary tract infection associated with cystostomy catheter ([facility] ) 2021    Neurogenic bladder 2021    Morbid obesity with BMI of 45 0-49 9, adult ([facility] [facility] ) 06/15/2021    History of heart artery stent 06/15/2021    Stage 4 chronic kidney disease ([address] ) 2021    Memory impairment 2021    Chronic bronchitis ([facility] [facility] ) 2021    Severe episode of recurrent major depressive disorder, without psychotic features ([facility] [facility] ) 2021    Skin ulcer of hand, limited to breakdown of skin ([facility] [facility] ) 2021    HTN (hypertension) 2020    Diabetic ulcer of toe of right foot associated with type 2 diabetes mellitus, with muscle involvement without evidence of necrosis ([facility] [address] ) 11/25/2020    Head lump 11/11/2020    Anemia 09/09/2020    Abnormal LFTs 09/09/2020    S/P cervical spinal fusion 09/09/2020    Major depressive disorder 09/02/2020    Type 2 diabetes mellitus with stage 4 chronic kidney disease, with long-term current use of insulin ([facility] [facility] ) 09/02/2020    Anxiety 09/02/2020    CAD (coronary artery disease) 09/02/2020    Osteoarthritis of left knee 09/02/2020    Cellulitis of finger of right hand 08/26/2020      LOS (days): 8  Geometric Mean LOS (GMLOS) (days): 3 70  Days to GMLOS:-5     OBJECTIVE:  Risk of Unplanned Readmission Score: 47 67         Current admission status: Inpatient   Preferred Pharmacy:   [address] [place] [address], [facility] [address] [place] [address] [address]  [place] [address]  Phone: [phone] Fax: [phone]    Primary Care Provider: CHERELLE Reyes    Primary Insurance: [facility] REP  Secondary Insurance: [address] [facility]    DISCHARGE DETAILS:          Comments - Freedom of Choice: PT/OT are recommending in rehab  Met with pt and she is agreeable to IP Rehab in the [facility] area  Referrals made and will f/u with pt

## 2022-06-30 NOTE — PLAN OF CARE
Problem: PHYSICAL THERAPY ADULT  Goal: Performs mobility at highest level of function for planned discharge setting  See evaluation for individualized goals  Description: Treatment/Interventions: Functional transfer training, LE strengthening/ROM, Elevations, Therapeutic exercise, Endurance training, Patient/family training, Equipment eval/education, Bed mobility, Gait training, Compensatory technique education, Spoke to nursing  Equipment Recommended:  (pt owns rollator)       See flowsheet documentation for full assessment, interventions and recommendations  Outcome: Progressing  Note: Prognosis: Good  Problem List: Decreased strength, Decreased endurance, Impaired balance, Decreased mobility, Obesity  Assessment: Pt seen for PT session w/ focus on t/f training (including on to + off of rollator) + gait training  Pt w/ good functional progress this session noted by increased distance covered during gait training w/ decreased assistance  Pt overall limited by PADILLA but w/ good awareness of need for pacing + to take seated rest breaks when needed  Cues for safety required to t/f onto and off of rollator safely (for hand placement when turning, to turn completely w/ feet prior to releasing the brakes + beginning to walk again, also recommended parking rollator against wall prior to applying brakes + sitting down to further minimize movement of device during t/f)  From a PT standpoint recommend rehab vs HHPT w/ continued progress  Will continue to follow  Barriers to Discharge: Inaccessible home environment, Decreased caregiver support  Barriers to Discharge Comments: 4-6 DOROTHEA     PT Discharge Recommendation:  (rehab vs HHPT pending continued progress)          See flowsheet documentation for full assessment

## 2022-06-30 NOTE — PHYSICAL THERAPY NOTE
Physical Therapy Treatment Note    Patient's Name: Brooklyn Coello  : 22 1509   PT Last Visit   PT Visit Date 22   Note Type   Note Type Treatment   Pain Assessment   Pain Assessment Tool 0-10   Pain Score No Pain   Restrictions/Precautions   Weight Bearing Precautions Per Order No   Other Precautions Fall Risk;Telemetry   General   Chart Reviewed Yes   Response to Previous Treatment Patient with no complaints from previous session  Family/Caregiver Present No   Subjective   Subjective Pt agreeable to mobilize  Bed Mobility   Additional Comments Pt greeted seated EOB  Transfers   Sit to Stand 5  Supervision   Additional items Increased time required   Stand to Sit 5  Supervision   Additional items Increased time required   Stand pivot 5  Supervision   Additional items Increased time required   Additional Comments rollator, CS + verbal cues required for t/f on + off rollator   Ambulation/Elevation   Gait pattern Improper Weight shift; Excessively slow; Short stride   Gait Assistance 5  Supervision   Additional items Verbal cues   Assistive Device   (rollator)   Distance 83'x3 (w/ seated rests in b/w on rollator)   Stair Management Assistance Not tested   Ambulation/Elevation Additional Comments limited by PADILLA   Balance   Static Sitting Fair +   Dynamic Sitting Fair   Static Standing Fair   Dynamic Standing Fair -   Ambulatory Fair -  (rollator)   Endurance Deficit   Endurance Deficit Yes   Endurance Deficit Description PADILLA, weakness, fatigue, SpO2 99% RA throughout session   Activity Tolerance   Activity Tolerance Patient limited by fatigue   Medical Staff Made Aware nsg   Nurse Made Aware yes   Assessment   Prognosis Good   Problem List Decreased strength;Decreased endurance; Impaired balance;Decreased mobility;Obesity   Assessment Pt seen for PT session w/ focus on t/f training (including on to + off of rollator) + gait training   Pt w/ good functional progress this session noted by increased distance covered during gait training w/ decreased assistance  Pt overall limited by PADILLA but w/ good awareness of need for pacing + to take seated rest breaks when needed  Cues for safety required to t/f onto and off of rollator safely (for hand placement when turning, to turn completely w/ feet prior to releasing the brakes + beginning to walk again, also recommended parking rollator against wall prior to applying brakes + sitting down to further minimize movement of device during t/f)  From a PT standpoint recommend rehab vs HHPT w/ continued progress  Will continue to follow  Barriers to Discharge Inaccessible home environment;Decreased caregiver support   Goals   Patient Goals get better   PT Treatment Day 2   Plan   Treatment/Interventions Functional transfer training;LE strengthening/ROM; Therapeutic exercise;Elevations; Endurance training;Patient/family training;Equipment eval/education; Bed mobility;Gait training; Compensatory technique education;Spoke to nursing  (balance training)   Progress Progressing toward goals   PT Frequency 3-5x/wk   Recommendation   PT Discharge Recommendation   (rehab vs HHPT pending continued progress)   Equipment Recommended   (BSC, rollator)   AM-PAC Basic Mobility Inpatient   Turning in Bed Without Bedrails 4   Lying on Back to Sitting on Edge of Flat Bed 3   Moving Bed to Chair 3   Standing Up From Chair 3   Walk in Room 3   Climb 3-5 Stairs 3   Basic Mobility Inpatient Raw Score 19   Basic Mobility Standardized Score 42 48   Highest Level Of Mobility   JH-HLM Goal 6: Walk 10 steps or more   JH-HLM Achieved 7: Walk 25 feet or more   Education   Education Provided Mobility training;Assistive device   Patient Demonstrates acceptance/verbal understanding   End of Consult   Patient Position at End of Consult Seated edge of bed; All needs within reach  (all lines in tact)     João Doe, PT, DPT

## 2022-06-30 NOTE — PLAN OF CARE
Problem: OCCUPATIONAL THERAPY ADULT  Goal: Performs self-care activities at highest level of function for planned discharge setting  See evaluation for individualized goals  Description: Treatment Interventions: ADL retraining, Functional transfer training, UE strengthening/ROM, Endurance training, Cognitive reorientation, Patient/family training, Equipment evaluation/education, Compensatory technique education, Activityengagement, Energy conservation          See flowsheet documentation for full assessment, interventions and recommendations  Outcome: Progressing  Note: Limitation: Decreased ADL status, Decreased UE strength, Decreased Safe judgement during ADL, Decreased cognition, Decreased sensation, Decreased endurance, Decreased self-care trans, Decreased high-level ADLs  Prognosis: Good  Assessment: Pt seen for skilled OT session focused on ADLs, functional transfers and mobility, energy conservation education and UE exercises  Pt seated EOB upon arrival and agreeable to participate  Pt setup with increased time to don/doff socks  Pt supervision sit to stand from bed and min assist functional mobility with RW to bathroom and supervision transfer at sink to complete grooming task and UB bathing with setup  Pt supervision sit to stand from chair and min assist functional mobility in homelike environment with RW  Pt completed b/l UE exercises seen above to increase strength and endurance for ADLs and functional tasks  Pt completed 2 sets x5reps sit to stand exercise with rest breaks needed  Pt with dyspnea and fatigue notes after tasks cues for proper breathing  Pt upright at end of session and all needs met  Pt continues to be limited due to decreased strength and endurance, impaired balance, inspired activity tolerance, fall risk, multiple lines increased pain, dyspnea all causing a decline in ADLs functional transfers and mobility   recommended STR vs home with home OT and family support pending progress  The patient's raw score on the AM-PAC Daily Activity inpatient short form is 18, standardized score is 38 66, less than 39 4  Patients at this level are likely to benefit from discharge to post-acute rehabilitation services  Please refer to the recommendation of the Occupational Therapist for safe discharge planning  OT Discharge Recommendation: Post acute rehabilitation services (vs  HOME OT w/ family support)  OT - OK to Discharge:  Yes

## 2022-06-30 NOTE — PROGRESS NOTES
NEPHROLOGY PROGRESS NOTE   Bianka Bun 63 y o  female MRN: [id number]  Unit/Bed#: E4 -01 Encounter: [id number]      ASSESSMENT & PLAN:  1  Acute kidney injury on top of CKD stage 4, baseline creatinine around 2 5-2 9  Patient follows with Dr Pelon Livingston as an outpatient  Creatinine peak of 4 52 on 04/23  Diuretics on hold  Her kidney function improved and creatinine is down to 2 1 today  Follow daily labs  As per Cardiology notes from yesterday plan for cardiac catheterization tomorrow morning time unknown, recommend intravenously fluids as below, minimize intravenously dye dose as possible    2  Coronary artery disease with multiple PCIs, elevated troponins, syncopal   Cardiology on board, status post stress test yesterday that showed multiple regional wall motion abnormality however affected by significant soft tissue and diaphragmatic attenuation artifact  Diuretics currently on hold  Cardiology note from yesterday reviewed, after reviewing echo cardiology seen is reasonable to proceed with cardiac catheterization for Friday morning  Recommend intravenously fluid hydration with normal saline 1 cc/kilos for at least 4 hours before and 6 hours after as long as okay with Cardiology  Recommend to minimize intravenously dye as much as possible    3  Hemodynamics, blood pressure overall acceptable, reported patient was admitted with syncope with associated hypotension suspected secondary to volume depletion  Diuretics on hold    4  Urinary retention with chronic suprapubic catheter    5  Anemia, multifactorial in the setting of CKD, monitor H&H, last hemoglobin 8 1 today    My plan and recommendations were discussed with Dr Mabel Rodriguez from Internal Medicine team       SUBJECTIVE:  Patient seen and examined, not feeling well today complaining of severe headache as well as abdominal discomfort and diarrhea, denies any vomiting, no nausea, no chest pain, no shortness of breath        OBJECTIVE:  Current Weight: Weight - Scale: 126 kg (277 lb 1 9 oz)  Vitals:    06/30/22 0742   BP: 145/64   Pulse: 59   Resp: 18   Temp: (!) 96 °F (35 6 °C)   SpO2: 97%       Intake/Output Summary (Last 24 hours) at 6/30/2022 1104  Last data filed at 6/30/2022 0601  Gross per 24 hour   Intake --   Output 1125 ml   Net -1125 ml       General: conscious, cooperative, in not acute distress  Eyes: conjunctivae pale, anicteric sclerae  ENT: lips and mucous membranes moist  Neck: supple, no JVD  Chest: clear breath sounds bilateral, no crackles, ronchus or wheezings  CVS: distinct S1 & S2, normal rate, regular rhythm  Abdomen:  Obese, non-tender, non-distended, normoactive bowel sounds  Extremities:  No significant edema of both legs  Skin: no rash  Neuro: awake, alert, oriented        Medications:    Current Facility-Administered Medications:     acetaminophen (TYLENOL) tablet 650 mg, 650 mg, Oral, Q6H PRN, Mario Justice MD, 650 mg at 06/28/22 0836    al mag oxide-diphenhydramine-lidocaine viscous (MAGIC MOUTHWASH) suspension 10 mL, 10 mL, Swish & Swallow, Q6H PRN, CHERELLE Dubois, 10 mL at 06/25/22 2129    allopurinol (ZYLOPRIM) tablet 300 mg, 300 mg, Oral, Daily, Debi House PA-C, 300 mg at 06/30/22 0900    aluminum-magnesium hydroxide-simethicone (MYLANTA) oral suspension 30 mL, 30 mL, Oral, Q4H PRN, Daryl Mayfield DO    amLODIPine (NORVASC) tablet 10 mg, 10 mg, Oral, Daily, Delfina Thomas MD, 10 mg at 06/30/22 0900    aspirin chewable tablet 81 mg, 81 mg, Oral, Daily, Елена Agarwal DO, 81 mg at 06/30/22 0858    atorvastatin (LIPITOR) tablet 40 mg, 40 mg, Oral, Daily With Laurinda Lundborg M Delabre, PA-C, 40 mg at 06/29/22 1558    bisoprolol (ZEBETA) tablet 2 5 mg, 2 5 mg, Oral, Daily, Delfina Thomas MD, 2 5 mg at 06/30/22 0859    butalbital-acetaminophen-caffeine (FIORICET,ESGIC) -40 mg per tablet 1 tablet, 1 tablet, Oral, Q4H PRN, Clark Willis MD, 1 tablet at 06/30/22 0906    citalopram (CeleXA) tablet 40 mg, 40 mg, Oral, Daily, Debi House PA-C, 40 mg at 06/30/22 0900    clopidogrel (PLAVIX) tablet 75 mg, 75 mg, Oral, Daily, Debi House PA-C, 75 mg at 06/30/22 0900    heparin (porcine) subcutaneous injection 5,000 Units, 5,000 Units, Subcutaneous, Q8H [address], Jocelin Luna MD, 5,000 Units at 06/30/22 0539    HYDROcodone-acetaminophen (NORCO) 5-325 mg per tablet 1 tablet, 1 tablet, Oral, Q6H PRN, James Pop DO, 1 tablet at 06/30/22 0205    insulin glargine (LANTUS) subcutaneous injection 30 Units 0 3 mL, 30 Units, Subcutaneous, Q12H [address], James Pop DO, 30 Units at 06/30/22 0906    insulin lispro (HumaLOG) 100 units/mL subcutaneous injection 1-6 Units, 1-6 Units, Subcutaneous, 4x Daily (AC & HS), 2 Units at 06/29/22 2132 **AND** Fingerstick Glucose (POCT), , , 4x Daily AC and at bedtime, Debi House PA-C    insulin lispro (HumaLOG) 100 units/mL subcutaneous injection 10 Units, 10 Units, Subcutaneous, TID With Meals, James Pop DO, 10 Units at 06/29/22 1717    isosorbide mononitrate (IMDUR) 24 hr tablet 60 mg, 60 mg, Oral, Daily, Debi House PA-C, 60 mg at 06/30/22 0900    levothyroxine tablet 50 mcg, 50 mcg, Oral, Early Morning, Debi House PA-C, 50 mcg at 06/30/22 0539    naloxone [facility]) injection 2 mg, 2 mg, Intravenous, Once, James Pop DO    OLANZapine (ZyPREXA) tablet 5 mg, 5 mg, Oral, HS, Debi House PA-C, 5 mg at 06/29/22 2133    ondansetron (ZOFRAN) injection 4 mg, 4 mg, Intravenous, Q6H PRN, Debi House PA-C, 4 mg at 06/28/22 1076    perflutren lipid microsphere (DEFINITY) injection, 0 2 mL/min, Intravenous, Once in imaging, Ather MD William    senna-docusate sodium (SENOKOT S) 8 6-50 mg per tablet 1 tablet, 1 tablet, Oral, BID, James Pop DO, 1 tablet at 06/29/22 1717    sorbitol 70 % solution 30 mL, 30 mL, Oral, Daily, James Pop DO, 30 mL at 06/30/22 3007    Invasive Devices:        Lab Results:   Results from last 7 days   Lab Units 06/30/22  0607 06/28/22  0609 06/27/22  0606 06/26/22  0644   WBC Thousand/uL 10 06 9 83  --  10 96*   HEMOGLOBIN g/dL 8 1* 8 1*  --  7 9*   HEMATOCRIT % 26 3* 25 5*  --  23 8*   HEMATOCRIT  %  --  23 6*  --   --    PLATELETS Thousands/uL 310 319  --  296   SODIUM mmol/L 143 139 140 137   POTASSIUM mmol/L 3 7 4 2 4 3 5 0   CHLORIDE mmol/L 106 106 107 105   CO2 mmol/L 26 24 24 24   BUN mg/dL 55* 63* 72* 82*   CREATININE mg/dL 2 16* 2 15* 2 66* 3 22*   CALCIUM mg/dL 8 3 8 2* 8 4 8 1*   MAGNESIUM mg/dL  --  2 6  --  3 1*   PHOSPHORUS mg/dL  --   --   --  5 1*       Previous work up:  See previous notes      Portions of the record may have been created with voice recognition software  Occasional wrong word or "sound a like" substitutions may have occurred due to the inherent limitations of voice recognition software  Read the chart carefully and recognize, using context, where substitutions have occurred  If you have any questions, please contact the dictating provider

## 2022-06-30 NOTE — PROGRESS NOTES
Progress Note - Addy Kenney 63 y o  female MRN: [id number]    Unit/Bed#: E4 -01 Encounter: [id number]      Subjective: The patient feels pretty well  She had no chest pain overnight  She denies shortness of breath  She has no abdominal pain, nausea, or vomiting  She is eating well  Physical Exam:   Temp:  [96 °F (35 6 °C)-97 5 °F (36 4 °C)] 96 °F (35 6 °C)  HR:  [59-73] 59  Resp:  [18-20] 18  BP: (143-163)/(64-74) 145/64    Gen:  Well-developed, obese, in no distress  Neck:  Supple  No lymphadenopathy, goiter, or bruit  Heart:  Regular rhythm  No murmur, gallop, or rub  Lungs:  Clear to auscultation and percussion  No wheezing, rales, or rhonchi   Abd:  Soft with active bowel sounds  No mass, tenderness, or organomegaly  Extremities:  No clubbing, cyanosis, or edema  Neuro:  Alert and oriented  No focal sign  Skin:  Warm and dry  LABS:   CBC:   Lab Results   Component Value Date    WBC 10 06 06/30/2022    HGB 8 1 (L) 06/30/2022    HCT 26 3 (L) 06/30/2022     (H) 06/30/2022     06/30/2022    MCH 31 0 06/30/2022    MCHC 30 8 (L) 06/30/2022    RDW 15 7 (H) 06/30/2022    MPV 9 2 06/30/2022    NRBC 0 06/30/2022   , CMP:   Lab Results   Component Value Date    SODIUM 143 06/30/2022    K 3 7 06/30/2022     06/30/2022    CO2 26 06/30/2022    BUN 55 (H) 06/30/2022    CREATININE 2 16 (H) 06/30/2022    CALCIUM 8 3 06/30/2022    EGFR 24 06/30/2022             Assessment/Plan:  1  Coronary artery disease with chest pain  2  Elevated troponin  3  Syncope secondary to hypotension  4  Chronic kidney disease stage 4 with acute kidney injury  5  Anemia of chronic kidney disease  6  Hyponatremia, resolved  7  Bradycardia, beta-blockers have been resumed  8  Chronic combined systolic and diastolic congestive heart failure  9  Type 2 diabetes with neuropathy  10   Obesity    Evaluation thus far has revealed elevated troponin, deteriorating ventricular function, and areas of reversible ischemia on stress testing  The patient will undergo catheterization tomorrow  Efforts are progress to optimize her kidney function        VTE Pharmacologic Prophylaxis: Heparin  VTE Mechanical Prophylaxis: sequential compression device

## 2022-07-01 LAB
ANION GAP SERPL CALCULATED.3IONS-SCNC: 11 MMOL/L (ref 4–13)
BUN SERPL-MCNC: 51 MG/DL (ref 5–25)
CALCIUM SERPL-MCNC: 8.4 MG/DL (ref 8.3–10.1)
CHLORIDE SERPL-SCNC: 108 MMOL/L (ref 100–108)
CO2 SERPL-SCNC: 24 MMOL/L (ref 21–32)
CREAT SERPL-MCNC: 2.04 MG/DL (ref 0.6–1.3)
GFR SERPL CREATININE-BSD FRML MDRD: 26 ML/MIN/1.73SQ M
GLUCOSE SERPL-MCNC: 103 MG/DL (ref 65–140)
GLUCOSE SERPL-MCNC: 119 MG/DL (ref 65–140)
GLUCOSE SERPL-MCNC: 229 MG/DL (ref 65–140)
GLUCOSE SERPL-MCNC: 87 MG/DL (ref 65–140)
GLUCOSE SERPL-MCNC: 99 MG/DL (ref 65–140)
KCT BLD-ACNC: 263 SEC (ref 89–137)
POTASSIUM SERPL-SCNC: 4.5 MMOL/L (ref 3.5–5.3)
SODIUM SERPL-SCNC: 143 MMOL/L (ref 136–145)
SPECIMEN SOURCE: ABNORMAL

## 2022-07-01 PROCEDURE — 93454 CORONARY ARTERY ANGIO S&I: CPT | Performed by: INTERNAL MEDICINE

## 2022-07-01 PROCEDURE — 027034Z DILATION OF CORONARY ARTERY, ONE ARTERY WITH DRUG-ELUTING INTRALUMINAL DEVICE, PERCUTANEOUS APPROACH: ICD-10-PCS | Performed by: INTERNAL MEDICINE

## 2022-07-01 PROCEDURE — 82948 REAGENT STRIP/BLOOD GLUCOSE: CPT

## 2022-07-01 PROCEDURE — 85347 COAGULATION TIME ACTIVATED: CPT

## 2022-07-01 PROCEDURE — 92978 ENDOLUMINL IVUS OCT C 1ST: CPT | Performed by: INTERNAL MEDICINE

## 2022-07-01 PROCEDURE — 80048 BASIC METABOLIC PNL TOTAL CA: CPT | Performed by: INTERNAL MEDICINE

## 2022-07-01 PROCEDURE — B240ZZ3 ULTRASONOGRAPHY OF SINGLE CORONARY ARTERY, INTRAVASCULAR: ICD-10-PCS | Performed by: INTERNAL MEDICINE

## 2022-07-01 PROCEDURE — C1725 CATH, TRANSLUMIN NON-LASER: HCPCS | Performed by: INTERNAL MEDICINE

## 2022-07-01 PROCEDURE — 99233 SBSQ HOSP IP/OBS HIGH 50: CPT

## 2022-07-01 PROCEDURE — 99232 SBSQ HOSP IP/OBS MODERATE 35: CPT | Performed by: INTERNAL MEDICINE

## 2022-07-01 PROCEDURE — 99153 MOD SED SAME PHYS/QHP EA: CPT | Performed by: INTERNAL MEDICINE

## 2022-07-01 PROCEDURE — 4A0335C MEASUREMENT OF ARTERIAL FLOW, CORONARY, PERCUTANEOUS APPROACH: ICD-10-PCS | Performed by: INTERNAL MEDICINE

## 2022-07-01 PROCEDURE — C9600 PERC DRUG-EL COR STENT SING: HCPCS | Performed by: INTERNAL MEDICINE

## 2022-07-01 PROCEDURE — C1753 CATH, INTRAVAS ULTRASOUND: HCPCS | Performed by: INTERNAL MEDICINE

## 2022-07-01 PROCEDURE — NC001 PR NO CHARGE: Performed by: INTERNAL MEDICINE

## 2022-07-01 PROCEDURE — 99152 MOD SED SAME PHYS/QHP 5/>YRS: CPT | Performed by: INTERNAL MEDICINE

## 2022-07-01 PROCEDURE — 92928 PRQ TCAT PLMT NTRAC ST 1 LES: CPT | Performed by: INTERNAL MEDICINE

## 2022-07-01 PROCEDURE — C1769 GUIDE WIRE: HCPCS | Performed by: INTERNAL MEDICINE

## 2022-07-01 PROCEDURE — C1894 INTRO/SHEATH, NON-LASER: HCPCS | Performed by: INTERNAL MEDICINE

## 2022-07-01 PROCEDURE — 93571 IV DOP VEL&/PRESS C FLO 1ST: CPT | Performed by: INTERNAL MEDICINE

## 2022-07-01 PROCEDURE — C1887 CATHETER, GUIDING: HCPCS | Performed by: INTERNAL MEDICINE

## 2022-07-01 PROCEDURE — C1874 STENT, COATED/COV W/DEL SYS: HCPCS | Performed by: INTERNAL MEDICINE

## 2022-07-01 DEVICE — XIENCE SKYPOINT™ EVEROLIMUS ELUTING CORONARY STENT SYSTEM 3.50 MM X 33 MM / RAPID-EXCHANGE
Type: IMPLANTABLE DEVICE | Site: CORONARY | Status: FUNCTIONAL
Brand: XIENCE SKYPOINT™

## 2022-07-01 RX ORDER — HEPARIN SODIUM 1000 [USP'U]/ML
INJECTION, SOLUTION INTRAVENOUS; SUBCUTANEOUS AS NEEDED
Status: DISCONTINUED | OUTPATIENT
Start: 2022-07-01 | End: 2022-07-01 | Stop reason: HOSPADM

## 2022-07-01 RX ORDER — ONDANSETRON 2 MG/ML
INJECTION INTRAMUSCULAR; INTRAVENOUS AS NEEDED
Status: DISCONTINUED | OUTPATIENT
Start: 2022-07-01 | End: 2022-07-01 | Stop reason: HOSPADM

## 2022-07-01 RX ORDER — NITROGLYCERIN 20 MG/100ML
INJECTION INTRAVENOUS AS NEEDED
Status: DISCONTINUED | OUTPATIENT
Start: 2022-07-01 | End: 2022-07-01 | Stop reason: HOSPADM

## 2022-07-01 RX ORDER — LIDOCAINE HYDROCHLORIDE 10 MG/ML
INJECTION, SOLUTION EPIDURAL; INFILTRATION; INTRACAUDAL; PERINEURAL AS NEEDED
Status: DISCONTINUED | OUTPATIENT
Start: 2022-07-01 | End: 2022-07-01 | Stop reason: HOSPADM

## 2022-07-01 RX ORDER — FENTANYL CITRATE 50 UG/ML
INJECTION, SOLUTION INTRAMUSCULAR; INTRAVENOUS AS NEEDED
Status: DISCONTINUED | OUTPATIENT
Start: 2022-07-01 | End: 2022-07-01 | Stop reason: HOSPADM

## 2022-07-01 RX ORDER — NITROGLYCERIN 0.4 MG/1
0.4 TABLET SUBLINGUAL
Status: DISCONTINUED | OUTPATIENT
Start: 2022-07-01 | End: 2022-07-02 | Stop reason: HOSPADM

## 2022-07-01 RX ORDER — SODIUM CHLORIDE 9 MG/ML
75 INJECTION, SOLUTION INTRAVENOUS CONTINUOUS
Status: DISCONTINUED | OUTPATIENT
Start: 2022-07-01 | End: 2022-07-02

## 2022-07-01 RX ORDER — MIDAZOLAM HYDROCHLORIDE 2 MG/2ML
INJECTION, SOLUTION INTRAMUSCULAR; INTRAVENOUS AS NEEDED
Status: DISCONTINUED | OUTPATIENT
Start: 2022-07-01 | End: 2022-07-01 | Stop reason: HOSPADM

## 2022-07-01 RX ORDER — VERAPAMIL HCL 2.5 MG/ML
AMPUL (ML) INTRAVENOUS AS NEEDED
Status: DISCONTINUED | OUTPATIENT
Start: 2022-07-01 | End: 2022-07-01 | Stop reason: HOSPADM

## 2022-07-01 RX ADMIN — ATORVASTATIN CALCIUM 40 MG: 40 TABLET, FILM COATED ORAL at 18:34

## 2022-07-01 RX ADMIN — HEPARIN SODIUM 5000 UNITS: 5000 INJECTION INTRAVENOUS; SUBCUTANEOUS at 05:38

## 2022-07-01 RX ADMIN — SODIUM CHLORIDE 75 ML/HR: 0.9 INJECTION, SOLUTION INTRAVENOUS at 18:35

## 2022-07-01 RX ADMIN — LEVOTHYROXINE SODIUM 50 MCG: 50 TABLET ORAL at 05:38

## 2022-07-01 RX ADMIN — DOCUSATE SODIUM 50MG AND SENNOSIDES 8.6MG 1 TABLET: 8.6; 5 TABLET, FILM COATED ORAL at 08:20

## 2022-07-01 RX ADMIN — ASPIRIN 81 MG CHEWABLE TABLET 81 MG: 81 TABLET CHEWABLE at 08:20

## 2022-07-01 RX ADMIN — CITALOPRAM HYDROBROMIDE 40 MG: 20 TABLET ORAL at 08:20

## 2022-07-01 RX ADMIN — BISOPROLOL FUMARATE 2.5 MG: 5 TABLET ORAL at 08:20

## 2022-07-01 RX ADMIN — ALLOPURINOL 300 MG: 300 TABLET ORAL at 08:20

## 2022-07-01 RX ADMIN — INSULIN GLARGINE 30 UNITS: 100 INJECTION, SOLUTION SUBCUTANEOUS at 21:32

## 2022-07-01 RX ADMIN — OLANZAPINE 5 MG: 5 TABLET, FILM COATED ORAL at 21:34

## 2022-07-01 RX ADMIN — BUTALBITAL, ACETAMINOPHEN AND CAFFEINE 1 TABLET: 50; 325; 40 TABLET ORAL at 00:03

## 2022-07-01 RX ADMIN — CLOPIDOGREL BISULFATE 75 MG: 75 TABLET ORAL at 08:21

## 2022-07-01 RX ADMIN — ISOSORBIDE MONONITRATE 60 MG: 60 TABLET, EXTENDED RELEASE ORAL at 08:20

## 2022-07-01 RX ADMIN — HYDROCODONE BITARTRATE AND ACETAMINOPHEN 1 TABLET: 5; 325 TABLET ORAL at 22:58

## 2022-07-01 RX ADMIN — ACETAMINOPHEN 325MG 650 MG: 325 TABLET ORAL at 19:38

## 2022-07-01 RX ADMIN — AMLODIPINE BESYLATE 10 MG: 10 TABLET ORAL at 08:20

## 2022-07-01 RX ADMIN — SODIUM CHLORIDE 75 ML/HR: 0.9 INJECTION, SOLUTION INTRAVENOUS at 05:38

## 2022-07-01 RX ADMIN — INSULIN LISPRO 2 UNITS: 100 INJECTION, SOLUTION INTRAVENOUS; SUBCUTANEOUS at 21:33

## 2022-07-01 NOTE — PROGRESS NOTES
NEPHROLOGY PROGRESS NOTE   Kyrie Nina 63 y o  female MRN: [id number]  Unit/Bed#: E4 -01 Encounter: [id number]      ASSESSMENT & PLAN:  1  Acute kidney injury on top of CKD stage 4, baseline creatinine around 2 5-2 9  Patient follows with Dr Vandana Jones as an outpatient  Creatinine peak of 4 52 on 04/23  Diuretics were held  Kidney function improved and is currently below her baseline at 2 04 today  Plan for cardiac catheterization as per Cardiology today  Keep holding diuretics, was started on intravenously fluids pre and post   Follow daily labs after  2  Coronary artery disease with multiple PCIs, elevated troponins, syncopal   Cardiology on board, status post stress test yesterday that showed multiple regional wall motion abnormality however affected by significant soft tissue and diaphragmatic attenuation artifact  Per Cardiology plan for cardiac catheterization today  Started on intravenously fluids pre and post, keep holding diuretics  Minimize intravenously dye as possible    3  Hemodynamics, blood pressure fluctuating in the higher side with some occasional high readings, currently getting intravenously fluids in anticipation of cardiac catheterization  Keep holding diuretics    4  Urinary retention with chronic suprapubic catheter    5  Anemia, multifactorial in the setting of CKD, monitor H&H, last hemoglobin 8 1 on 06/30        SUBJECTIVE:  Patient seen and examined, lying in bed, headache improved, no more diarrhea, denies chest pain or shortness of breath, a little bit somnolent but arousable and interactive    Currently NPO, was started on intravenously fluids, plan for cardiac catheterization as per cardio      OBJECTIVE:  Current Weight: Weight - Scale: 126 kg (277 lb 9 oz)  Vitals:    07/01/22 0804   BP: (!) 174/90   Pulse: 65   Resp: 18   Temp: 97 5 °F (36 4 °C)   SpO2: 97%       Intake/Output Summary (Last 24 hours) at 7/1/2022 1125  Last data filed at 7/1/2022 0900  Gross per 24 hour   Intake 985 ml   Output 1950 ml   Net -965 ml       General: conscious, cooperative, in not acute distress  Eyes: conjunctivae pale, anicteric sclerae  ENT: lips and mucous membranes moist, on room air  Neck: supple, no JVD  Chest: clear breath sounds bilateral, no crackles, ronchus or wheezings  CVS: distinct S1 & S2, normal rate, regular rhythm  Abdomen:  Obese, non-tender, non-distended, normoactive bowel sounds  Extremities: no significant edema of both legs  Skin: no rash  Neuro: awake, alert, oriented        Medications:    Current Facility-Administered Medications:     acetaminophen (TYLENOL) tablet 650 mg, 650 mg, Oral, Q6H PRN, Madeleine Tamayo MD, 650 mg at 06/28/22 0836    al mag oxide-diphenhydramine-lidocaine viscous (MAGIC MOUTHWASH) suspension 10 mL, 10 mL, Swish & Swallow, Q6H PRN, CHREELLE Dunaway, 10 mL at 06/25/22 2129    allopurinol (ZYLOPRIM) tablet 300 mg, 300 mg, Oral, Daily, Debi House PA-C, 300 mg at 07/01/22 0820    aluminum-magnesium hydroxide-simethicone (MYLANTA) oral suspension 30 mL, 30 mL, Oral, Q4H PRN, Elizabeth Tolentino DO, 30 mL at 06/30/22 1550    amLODIPine (NORVASC) tablet 10 mg, 10 mg, Oral, Daily, Delfina Thomas MD, 10 mg at 07/01/22 0820    aspirin chewable tablet 81 mg, 81 mg, Oral, Daily, Sahra Salinas DO, 81 mg at 07/01/22 0820    atorvastatin (LIPITOR) tablet 40 mg, 40 mg, Oral, Daily With Pop Moss PA-C, 40 mg at 06/30/22 1657    bisoprolol (ZEBETA) tablet 2 5 mg, 2 5 mg, Oral, Daily, Delfina Thomas MD, 2 5 mg at 07/01/22 0820    butalbital-acetaminophen-caffeine (FIORICET,ESGIC) -40 mg per tablet 1 tablet, 1 tablet, Oral, Q4H PRN, Susie Nuñez MD, 1 tablet at 07/01/22 0003    citalopram (CeleXA) tablet 40 mg, 40 mg, Oral, Daily, Debi House PA-C, 40 mg at 07/01/22 0820    clopidogrel (PLAVIX) tablet 75 mg, 75 mg, Oral, Daily, Debi House PA-C, 75 mg at 07/01/22 0821    heparin (porcine) subcutaneous injection 5,000 Units, 5,000 Units, Subcutaneous, Q8H [address], Jenni Moffett MD, 5,000 Units at 07/01/22 0538    HYDROcodone-acetaminophen (NORCO) 5-325 mg per tablet 1 tablet, 1 tablet, Oral, Q6H PRN, Roylene Oz, DO, 1 tablet at 06/30/22 1550    insulin glargine (LANTUS) subcutaneous injection 30 Units 0 3 mL, 30 Units, Subcutaneous, Q12H [address], Roylene Oz, DO, 30 Units at 06/30/22 2140    insulin lispro (HumaLOG) 100 units/mL subcutaneous injection 1-6 Units, 1-6 Units, Subcutaneous, 4x Daily (AC & HS), 5 Units at 06/30/22 1229 **AND** Fingerstick Glucose (POCT), , , 4x Daily AC and at bedtime, Debi House PA-C    insulin lispro (HumaLOG) 100 units/mL subcutaneous injection 10 Units, 10 Units, Subcutaneous, TID With Meals, Roylene Oz, DO, 10 Units at 06/30/22 1656    isosorbide mononitrate (IMDUR) 24 hr tablet 60 mg, 60 mg, Oral, Daily, Debi House PA-C, 60 mg at 07/01/22 0820    levothyroxine tablet 50 mcg, 50 mcg, Oral, Early Morning, Debi House PA-C, 50 mcg at 07/01/22 0538    naloxone [facility]) injection 2 mg, 2 mg, Intravenous, Once, Roylene Oz, DO    OLANZapine (ZyPREXA) tablet 5 mg, 5 mg, Oral, HS, Debi House PA-C, 5 mg at 06/30/22 2140    ondansetron (ZOFRAN) injection 4 mg, 4 mg, Intravenous, Q6H PRN, Debi House PA-C, 4 mg at 06/28/22 7552    perflutren lipid microsphere (DEFINITY) injection, 0 2 mL/min, Intravenous, Once in imaging, Delfina Thoams MD    senna-docusate sodium (SENOKOT S) 8 6-50 mg per tablet 1 tablet, 1 tablet, Oral, BID, Roylene Oz, DO, 1 tablet at 07/01/22 0820    sodium chloride 0 9 % infusion, 75 mL/hr, Intravenous, Continuous, Rujul Castrejon, DO, Last Rate: 75 mL/hr at 07/01/22 0538, 75 mL/hr at 07/01/22 0538    sorbitol 70 % solution 30 mL, 30 mL, Oral, Daily, Roylene Oz, DO, 30 mL at 06/30/22 7351    Invasive Devices:        Lab Results:   Results from last 7 days   Lab Units 07/01/22  0942 06/30/22  2182 06/28/22  0609 06/27/22  0606 06/26/22  0644   WBC Thousand/uL  --  10 06 9 83  --  10 96*   HEMOGLOBIN g/dL  --  8 1* 8 1*  --  7 9*   HEMATOCRIT %  --  26 3* 25 5*  --  23 8*   HEMATOCRIT  %  --   --  23 6*  --   --    PLATELETS Thousands/uL  --  310 319  --  296   SODIUM mmol/L 143 143 139   < > 137   POTASSIUM mmol/L 4 5 3 7 4 2   < > 5 0   CHLORIDE mmol/L 108 106 106   < > 105   CO2 mmol/L 24 26 24   < > 24   BUN mg/dL 51* 55* 63*   < > 82*   CREATININE mg/dL 2 04* 2 16* 2 15*   < > 3 22*   CALCIUM mg/dL 8 4 8 3 8 2*   < > 8 1*   MAGNESIUM mg/dL  --   --  2 6  --  3 1*   PHOSPHORUS mg/dL  --   --   --   --  5 1*    < > = values in this interval not displayed  Previous work up:  See previous notes      Portions of the record may have been created with voice recognition software  Occasional wrong word or "sound a like" substitutions may have occurred due to the inherent limitations of voice recognition software  Read the chart carefully and recognize, using context, where substitutions have occurred  If you have any questions, please contact the dictating provider

## 2022-07-01 NOTE — PLAN OF CARE
Problem: MOBILITY - ADULT  Goal: Maintain or return to baseline ADL function  Description: INTERVENTIONS:  -  Assess patient's ability to carry out ADLs; assess patient's baseline for ADL function and identify physical deficits which impact ability to perform ADLs (bathing, care of mouth/teeth, toileting, grooming, dressing, etc )  - Assess/evaluate cause of self-care deficits   - Assess range of motion  - Assess patient's mobility; develop plan if impaired  - Assess patient's need for assistive devices and provide as appropriate  - Encourage maximum independence but intervene and supervise when necessary  - Involve family in performance of ADLs  - Assess for home care needs following discharge   - Consider OT consult to assist with ADL evaluation and planning for discharge  - Provide patient education as appropriate  Outcome: Progressing  Goal: Maintains/Returns to pre admission functional level  Description: INTERVENTIONS:  - Perform BMAT or MOVE assessment daily    - Set and communicate daily mobility goal to care team and patient/family/caregiver  - Collaborate with rehabilitation services on mobility goals if consulted  - Perform Range of Motion  times a day  - Reposition patient every hours  - Dangle patient  times a day  - Stand patient  times a day  - Ambulate patient  times a day  - Out of bed to chair times a day   - Out of bed for meals  times a day  - Out of bed for toileting  - Record patient progress and toleration of activity level   Outcome: Progressing     Problem: Nutrition/Hydration-ADULT  Goal: Nutrient/Hydration intake appropriate for improving, restoring or maintaining nutritional needs  Description: Monitor and assess patient's nutrition/hydration status for malnutrition  Collaborate with interdisciplinary team and initiate plan and interventions as ordered  Monitor patient's weight and dietary intake as ordered or per policy  Utilize nutrition screening tool and intervene as necessary  Determine patient's food preferences and provide high-protein, high-caloric foods as appropriate       INTERVENTIONS:  - Monitor oral intake, urinary output, labs, and treatment plans  - Assess nutrition and hydration status and recommend course of action  - Evaluate amount of meals eaten  - Assist patient with eating if necessary   - Allow adequate time for meals  - Recommend/ encourage appropriate diets, oral nutritional supplements, and vitamin/mineral supplements  - Order, calculate, and assess calorie counts as needed  - Recommend, monitor, and adjust tube feedings and TPN/PPN based on assessed needs  - Assess need for intravenous fluids  - Provide specific nutrition/hydration education as appropriate  - Include patient/family/caregiver in decisions related to nutrition  Outcome: Progressing     Problem: Potential for Falls  Goal: Patient will remain free of falls  Description: INTERVENTIONS:  - Educate patient/family on patient safety including physical limitations  - Instruct patient to call for assistance with activity   - Consult OT/PT to assist with strengthening/mobility   - Keep Call bell within reach  - Keep bed low and locked with side rails adjusted as appropriate  - Keep care items and personal belongings within reach  - Initiate and maintain comfort rounds  - Make Fall Risk Sign visible to staff  - Offer Toileting every Hours, in advance of need  - Initiate/Maintain alarm  - Obtain necessary fall risk management equipment:   - Apply yellow socks and bracelet for high fall risk patients  - Consider moving patient to room near nurses station  Outcome: Progressing     Problem: INFECTION - ADULT  Goal: Absence or prevention of progression during hospitalization  Description: INTERVENTIONS:  - Assess and monitor for signs and symptoms of infection  - Monitor lab/diagnostic results  - Monitor all insertion sites, i e  indwelling lines, tubes, and drains  - Monitor endotracheal if appropriate and nasal secretions for changes in amount and color  - [place] appropriate cooling/warming therapies per order  - Administer medications as ordered  - Instruct and encourage patient and family to use good hand hygiene technique  - Identify and instruct in appropriate isolation precautions for identified infection/condition  Outcome: Progressing     Problem: GENITOURINARY - ADULT  Goal: Urinary catheter remains patent  Description: INTERVENTIONS:  - Assess patency of urinary catheter  - If patient has a chronic morales, consider changing catheter if non-functioning  - Follow guidelines for intermittent irrigation of non-functioning urinary catheter  Outcome: Progressing     Problem: METABOLIC, FLUID AND ELECTROLYTES - ADULT  Goal: Fluid balance maintained  Description: INTERVENTIONS:  - Monitor labs   - Monitor I/O and WT  - Instruct patient on fluid and nutrition as appropriate  - Assess for signs & symptoms of volume excess or deficit  Outcome: Progressing  Goal: Glucose maintained within target range  Description: INTERVENTIONS:  - Monitor Blood Glucose as ordered  - Assess for signs and symptoms of hyperglycemia and hypoglycemia  - Administer ordered medications to maintain glucose within target range  - Assess nutritional intake and initiate nutrition service referral as needed  Outcome: Progressing     Problem: HEMATOLOGIC - ADULT  Goal: Maintains hematologic stability  Description: INTERVENTIONS  - Assess for signs and symptoms of bleeding or hemorrhage  - Monitor labs  - Administer supportive blood products/factors as ordered and appropriate  Outcome: Progressing     Problem: DISCHARGE PLANNING  Goal: Discharge to home or other facility with appropriate resources  Description: INTERVENTIONS:  - Identify barriers to discharge w/patient and caregiver  - Arrange for needed discharge resources and transportation as appropriate  - Identify discharge learning needs (meds, wound care, etc )  - Arrange for interpretive services to assist at discharge as needed  - Refer to Case Management Department for coordinating discharge planning if the patient needs post-hospital services based on physician/advanced practitioner order or complex needs related to functional status, cognitive ability, or social support system  Outcome: Progressing     Problem: Knowledge Deficit  Goal: Patient/family/caregiver demonstrates understanding of disease process, treatment plan, medications, and discharge instructions  Description: Complete learning assessment and assess knowledge base    Interventions:  - Provide teaching at level of understanding  - Provide teaching via preferred learning methods  Outcome: Progressing     Problem: Prexisting or High Potential for Compromised Skin Integrity  Goal: Skin integrity is maintained or improved  Description: INTERVENTIONS:  - Identify patients at risk for skin breakdown  - Assess and monitor skin integrity  - Assess and monitor nutrition and hydration status  - Monitor labs   - Assess for incontinence   - Turn and reposition patient  - Assist with mobility/ambulation  - Relieve pressure over bony prominences  - Avoid friction and shearing  - Provide appropriate hygiene as needed including keeping skin clean and dry  - Evaluate need for skin moisturizer/barrier cream  - Collaborate with interdisciplinary team   - Patient/family teaching  - Consider wound care consult   Outcome: Progressing

## 2022-07-01 NOTE — PROGRESS NOTES
Successful IVUS-guided PCI of large dominant circumflex  See cath report for details  Note: patient does not have an RCA   RCA is non-dominant, small, and severely diseased  The circumflex is dominant and supplies the PDA

## 2022-07-01 NOTE — PROGRESS NOTES
Progress Note - Hazel Bajwa 63 y o  female MRN: [id number]    Unit/Bed#: E4 -01 Encounter: [id number]      Subjective: The patient feels pretty well  She had no difficulties overnight  She denies chest pain, shortness of breath, abdominal pain, nausea, or vomiting  She slept pretty well  Physical Exam:   Temp:  [96 °F (35 6 °C)-97 6 °F (36 4 °C)] 97 6 °F (36 4 °C)  HR:  [59-69] 69  Resp:  [18] 18  BP: (135-151)/(64-72) 151/72    Gen:  Well-developed, obese, in no distress  Neck:  Supple  No lymphadenopathy, goiter, or bruit  Heart:  Regular rhythm  No murmur, gallop, or rub  Lungs:  Clear to auscultation percussion  No wheezing, rales, or rhonchi  Abd:  Soft with active bowel sounds  No mass, tenderness, or organomegaly  Extremities:  No clubbing, cyanosis, or edema  No calf tenderness  Neuro:  Alert and oriented  No focal sign  Skin:  Warm and dry  LABS:  No new labs  Assessment/Plan:  1  Chest pain  2  Coronary artery disease  3  Elevated troponin  4  Chronic kidney disease stage 4 with acute kidney injury, improved  5  Anemia of chronic kidney disease  6  Hyponatremia, resolved  7  Bradycardia, stable despite resumption of beta-blockers  8  Chronic combined systolic and diastolic congestive heart failure  9  Type 2 diabetes with neuropathy  10  Obesity    The patient has been stable overnight  She is scheduled for catheterization today  She is receiving IV fluid to minimize the risk of kidney injury  Her other issues are currently stable        VTE Pharmacologic Prophylaxis: Heparin  VTE Mechanical Prophylaxis: sequential compression device

## 2022-07-01 NOTE — PROGRESS NOTES
Progress Note - Cardiology   Aislinn Carrington 63 y o  female MRN: [id number]  Unit/Bed#: E4 -01 Encounter: [id number]      Assessment/Recommendations/Discussion:    Coronary artery disease with multiple PCIs in the past, known chronic total occlusion of RCA, now with chest pain on admission with new regional wall motion abnormalities and further reduction in LV systolic function now to EF 30%   Acute renal failure on chronic kidney disease, improved/resolved   Recent acute enteritis   Ischemic cardiomyopathy with moderate to markedly reduced LV systolic function   Dyslipidemia   Anemia of chronic kidney disease   Diabetes   PCOS   Fibromyalgia   Neurogenic bladder with suprapubic catheter in place   Obstructive sleep apnea, uncorrected   Mild pulmonary hypertension           PLAN   Planning for cardiac catheterization today given further reduction LV systolic function with new regional wall motion abnormality seen on echo and elevated cardiac enzymes on admission with chest pain, known underlying coronary artery disease   Will need IV fluids before and after catheterization   Continue dual antiplatelet therapy, statin, isosorbide, amlodipine   Further recommendations pending results of catheterization           Subjective:   HPI  Feeling well today, no chest pain, planning for catheterization today      Review of Systems: As noted in HPI  Rest of ROS is negative      Vitals:   BP (!) 174/90 (BP Location: Left arm)   Pulse 65   Temp 97 5 °F (36 4 °C) (Temporal)   Resp 18   Ht 5' 8" (1 727 m)   Wt 126 kg (277 lb 9 oz)   SpO2 97%   BMI 42 20 kg/m²   Vitals:    06/30/22 0600 07/01/22 0548   Weight: 126 kg (277 lb 1 9 oz) 126 kg (277 lb 9 oz)       Intake/Output Summary (Last 24 hours) at 7/1/2022 1121  Last data filed at 7/1/2022 0900  Gross per 24 hour   Intake 985 ml   Output 1950 ml   Net -965 ml       Physical Exam   Constitutional: awake, alert and oriented, in no acute distress, no obvious deformities, obese female  Head: Normocephalic, without obvious abnormality, atraumatic  Eyes: conjunctivae clear and moist  Sclera anicteric  No xanthelasmas  Pupils equal bilaterally  Extraocular motions are full  Ear nose mouth and throat: ears are symmetrical bilaterally, hearing appears to be equal bilaterally, no nasal discharge or epistaxis, oropharynx is clear with moist mucous membranes  Neck:  Trachea is midline, neck is supple, no thyromegaly or significant lymphadenopathy, there is full range of motion  Lungs: clear to auscultation bilaterally, no wheezes, no rales, no rhonchi, no accessory muscle use, breathing is nonlabored  Heart: regular rate and rhythm, S1, S2 normal, no murmur, no click, no rub and no gallop, no lower extremity edema  Abdomen:  Obese, soft, non-tender; bowel sounds normal; no masses,  no organomegaly  Psychiatric:  Patient is oriented to time, place, person, mood/affect is negative for depression, anxiety, agitation, appears to have appropriate insight  Skin: Skin is warm, dry, intact  No obvious rashes or lesions on exposed extremities  Nail beds are pink with no cyanosis or clubbing      TELEMETRY:   Lab Results:  Results from last 7 days   Lab Units 06/30/22  0607   WBC Thousand/uL 10 06   HEMOGLOBIN g/dL 8 1*   HEMATOCRIT % 26 3*   PLATELETS Thousands/uL 310     Results from last 7 days   Lab Units 07/01/22  0942   POTASSIUM mmol/L 4 5   CHLORIDE mmol/L 108   CO2 mmol/L 24   BUN mg/dL 51*   CREATININE mg/dL 2 04*   CALCIUM mg/dL 8 4     Results from last 7 days   Lab Units 07/01/22  0942   POTASSIUM mmol/L 4 5   CHLORIDE mmol/L 108   CO2 mmol/L 24   BUN mg/dL 51*   CREATININE mg/dL 2 04*   CALCIUM mg/dL 8 4           Medications:    Current Facility-Administered Medications:     acetaminophen (TYLENOL) tablet 650 mg, 650 mg, Oral, Q6H PRN, Arleen Hall MD, 650 mg at 06/28/22 0836    al mag oxide-diphenhydramine-lidocaine viscous (MAGIC MOUTHWASH) suspension 10 mL, 10 mL, Swish & Swallow, Q6H PRN, CHERELLE Rangel, 10 mL at 06/25/22 2129    allopurinol (ZYLOPRIM) tablet 300 mg, 300 mg, Oral, Daily, Debi House PA-C, 300 mg at 07/01/22 0820    aluminum-magnesium hydroxide-simethicone (MYLANTA) oral suspension 30 mL, 30 mL, Oral, Q4H PRN, Brie Fitch DO, 30 mL at 06/30/22 1550    amLODIPine (NORVASC) tablet 10 mg, 10 mg, Oral, Daily, Delfina Thomas MD, 10 mg at 07/01/22 0820    aspirin chewable tablet 81 mg, 81 mg, Oral, Daily, Barbara Dickerson DO, 81 mg at 07/01/22 0820    atorvastatin (LIPITOR) tablet 40 mg, 40 mg, Oral, Daily With Dinner, Christo Hyde PA-C, 40 mg at 06/30/22 1657    bisoprolol (ZEBETA) tablet 2 5 mg, 2 5 mg, Oral, Daily, Delfina Thomas MD, 2 5 mg at 07/01/22 0820    butalbital-acetaminophen-caffeine (FIORICET,ESGIC) -40 mg per tablet 1 tablet, 1 tablet, Oral, Q4H PRN, Swathi Merino MD, 1 tablet at 07/01/22 0003    citalopram (CeleXA) tablet 40 mg, 40 mg, Oral, Daily, Debi House PA-C, 40 mg at 07/01/22 0820    clopidogrel (PLAVIX) tablet 75 mg, 75 mg, Oral, Daily, Debi House PA-C, 75 mg at 07/01/22 9263    heparin (porcine) subcutaneous injection 5,000 Units, 5,000 Units, Subcutaneous, Q8H [facility], Jeremy Valdez MD, 5,000 Units at 07/01/22 0538    HYDROcodone-acetaminophen (NORCO) 5-325 mg per tablet 1 tablet, 1 tablet, Oral, Q6H PRN, Brie Fitch DO, 1 tablet at 06/30/22 1550    insulin glargine (LANTUS) subcutaneous injection 30 Units 0 3 mL, 30 Units, Subcutaneous, Q12H [facility], Brie Fitch DO, 30 Units at 06/30/22 2140    insulin lispro (HumaLOG) 100 units/mL subcutaneous injection 1-6 Units, 1-6 Units, Subcutaneous, 4x Daily (AC & HS), 5 Units at 06/30/22 1229 **AND** Fingerstick Glucose (POCT), , , 4x Daily AC and at bedtime, Debi House PA-C    insulin lispro (HumaLOG) 100 units/mL subcutaneous injection 10 Units, 10 Units, Subcutaneous, TID With Meals, Brie Fitch DO, 10 Units at 06/30/22 1656    isosorbide mononitrate (IMDUR) 24 hr tablet 60 mg, 60 mg, Oral, Daily, Debi House PA-C, 60 mg at 07/01/22 0820    levothyroxine tablet 50 mcg, 50 mcg, Oral, Early Morning, Debi House PA-C, 50 mcg at 07/01/22 0538    naloxone [facility]) injection 2 mg, 2 mg, Intravenous, Once, Elizabeth Tolentino DO    OLANZapine (ZyPREXA) tablet 5 mg, 5 mg, Oral, HS, Debi House PA-C, 5 mg at 06/30/22 2140    ondansetron (ZOFRAN) injection 4 mg, 4 mg, Intravenous, Q6H PRN, Debi House PA-C, 4 mg at 06/28/22 4876    perflutren lipid microsphere (DEFINITY) injection, 0 2 mL/min, Intravenous, Once in imaging, Ather MD William    senna-docusate sodium (SENOKOT S) 8 6-50 mg per tablet 1 tablet, 1 tablet, Oral, BID, Elizabeth Tolentino, DO, 1 tablet at 07/01/22 0820    sodium chloride 0 9 % infusion, 75 mL/hr, Intravenous, Continuous, Rumiguel Castrejon DO, Last Rate: 75 mL/hr at 07/01/22 0538, 75 mL/hr at 07/01/22 0538    sorbitol 70 % solution 30 mL, 30 mL, Oral, Daily, Elizabeth Tolentino, DO, 30 mL at 06/30/22 6200    This note was completed in part utilizing M-[employer] Fluency Direct Software  Grammatical errors, random word insertions, spelling mistakes, and incomplete sentences may be an occasional consequence of this system secondary to software limitations, ambient noise, and hardware issues  If you have any questions or concerns about the content, text, or information contained within the body of this dictation, please contact the provider for clarification      Levon Malave DO, [facility]  7/1/2022 11:21 AM

## 2022-07-01 NOTE — OCCUPATIONAL THERAPY NOTE
Occupational Therapy Cancellation Note        Patient Name: Addy Kenney  NQHCR'T Date: 7/1/2022    Attempted to see pt for OT session and pt declined as she is to go for cardiac procedure in the next hour  Will continue to follow to address OT POC       Jacqueline Fleming MS, OTR/L

## 2022-07-02 VITALS
BODY MASS INDEX: 42.7 KG/M2 | SYSTOLIC BLOOD PRESSURE: 160 MMHG | WEIGHT: 281.75 LBS | RESPIRATION RATE: 17 BRPM | DIASTOLIC BLOOD PRESSURE: 75 MMHG | OXYGEN SATURATION: 90 % | TEMPERATURE: 96.1 F | HEIGHT: 68 IN | HEART RATE: 63 BPM

## 2022-07-02 LAB
ANION GAP SERPL CALCULATED.3IONS-SCNC: 10 MMOL/L (ref 4–13)
BUN SERPL-MCNC: 48 MG/DL (ref 5–25)
CALCIUM SERPL-MCNC: 7.9 MG/DL (ref 8.3–10.1)
CHLORIDE SERPL-SCNC: 107 MMOL/L (ref 100–108)
CO2 SERPL-SCNC: 25 MMOL/L (ref 21–32)
CREAT SERPL-MCNC: 2.16 MG/DL (ref 0.6–1.3)
GFR SERPL CREATININE-BSD FRML MDRD: 24 ML/MIN/1.73SQ M
GLUCOSE SERPL-MCNC: 100 MG/DL (ref 65–140)
GLUCOSE SERPL-MCNC: 116 MG/DL (ref 65–140)
GLUCOSE SERPL-MCNC: 86 MG/DL (ref 65–140)
POTASSIUM SERPL-SCNC: 4 MMOL/L (ref 3.5–5.3)
SODIUM SERPL-SCNC: 142 MMOL/L (ref 136–145)

## 2022-07-02 PROCEDURE — 99232 SBSQ HOSP IP/OBS MODERATE 35: CPT

## 2022-07-02 PROCEDURE — 82948 REAGENT STRIP/BLOOD GLUCOSE: CPT

## 2022-07-02 PROCEDURE — 80048 BASIC METABOLIC PNL TOTAL CA: CPT | Performed by: INTERNAL MEDICINE

## 2022-07-02 PROCEDURE — 99239 HOSP IP/OBS DSCHRG MGMT >30: CPT | Performed by: INTERNAL MEDICINE

## 2022-07-02 RX ORDER — AMLODIPINE BESYLATE 10 MG/1
10 TABLET ORAL DAILY
Qty: 90 TABLET | Refills: 0 | Status: SHIPPED | OUTPATIENT
Start: 2022-07-02 | End: 2022-07-19

## 2022-07-02 RX ORDER — ATORVASTATIN CALCIUM 80 MG/1
80 TABLET, FILM COATED ORAL
Status: DISCONTINUED | OUTPATIENT
Start: 2022-07-02 | End: 2022-07-02 | Stop reason: HOSPADM

## 2022-07-02 RX ORDER — BISOPROLOL FUMARATE 5 MG/1
2.5 TABLET, FILM COATED ORAL DAILY
Qty: 90 TABLET | Refills: 0 | Status: SHIPPED | OUTPATIENT
Start: 2022-07-02 | End: 2022-09-01 | Stop reason: SDUPTHER

## 2022-07-02 RX ORDER — HYDROMORPHONE HCL IN WATER/PF 6 MG/30 ML
0.2 PATIENT CONTROLLED ANALGESIA SYRINGE INTRAVENOUS ONCE AS NEEDED
Status: COMPLETED | OUTPATIENT
Start: 2022-07-02 | End: 2022-07-02

## 2022-07-02 RX ORDER — TORSEMIDE 20 MG/1
40 TABLET ORAL DAILY
Qty: 180 TABLET | Refills: 0 | Status: SHIPPED | OUTPATIENT
Start: 2022-07-04 | End: 2022-07-19

## 2022-07-02 RX ADMIN — BISOPROLOL FUMARATE 2.5 MG: 5 TABLET ORAL at 08:50

## 2022-07-02 RX ADMIN — CLOPIDOGREL BISULFATE 75 MG: 75 TABLET ORAL at 08:51

## 2022-07-02 RX ADMIN — SORBITOL SOLUTION (BULK) 30 ML: 70 SOLUTION at 08:52

## 2022-07-02 RX ADMIN — AMLODIPINE BESYLATE 10 MG: 10 TABLET ORAL at 08:51

## 2022-07-02 RX ADMIN — ALLOPURINOL 300 MG: 300 TABLET ORAL at 08:50

## 2022-07-02 RX ADMIN — ASPIRIN 81 MG CHEWABLE TABLET 81 MG: 81 TABLET CHEWABLE at 08:50

## 2022-07-02 RX ADMIN — DOCUSATE SODIUM 50MG AND SENNOSIDES 8.6MG 1 TABLET: 8.6; 5 TABLET, FILM COATED ORAL at 08:50

## 2022-07-02 RX ADMIN — INSULIN GLARGINE 30 UNITS: 100 INJECTION, SOLUTION SUBCUTANEOUS at 08:51

## 2022-07-02 RX ADMIN — HYDROMORPHONE HYDROCHLORIDE 0.2 MG: 0.2 INJECTION, SOLUTION INTRAMUSCULAR; INTRAVENOUS; SUBCUTANEOUS at 00:39

## 2022-07-02 RX ADMIN — LEVOTHYROXINE SODIUM 50 MCG: 50 TABLET ORAL at 05:09

## 2022-07-02 RX ADMIN — SODIUM CHLORIDE 75 ML/HR: 0.9 INJECTION, SOLUTION INTRAVENOUS at 05:14

## 2022-07-02 RX ADMIN — ISOSORBIDE MONONITRATE 60 MG: 60 TABLET, EXTENDED RELEASE ORAL at 08:50

## 2022-07-02 RX ADMIN — HEPARIN SODIUM 5000 UNITS: 5000 INJECTION INTRAVENOUS; SUBCUTANEOUS at 05:09

## 2022-07-02 RX ADMIN — CITALOPRAM HYDROBROMIDE 40 MG: 20 TABLET ORAL at 08:51

## 2022-07-02 NOTE — QUICK NOTE
Lab review:  Creatinine is stable at 2 1 mg/dL (baseline 2 5-2 9)  Patient stable for discharge from our end, has appointment with Dr Neftaly Coronel on 9/6  I will message the office to move her appointment to be seen sooner    Discussed with Cardiology      Everton Huddleston MD  Nephrology Attending

## 2022-07-02 NOTE — PROGRESS NOTES
Progress Note - Cardiology   Erika Cherry 63 y o  female MRN: [id number]  Unit/Bed#: E4 -01 Encounter: [id number]      Assessment/Recommendations/Discussion:   · Coronary artery disease with multiple PCIs in the past, known chronic total occlusion of RCA, with chest pain on admission with new regional wall motion abnormalities and further reduction in LV systolic function now to EF 30% - status post cardiac catheterization on July 1, 2022 with PCI to the mid circumflex artery  · Acute renal failure on chronic kidney disease, improved/resolved  · Recent acute enteritis  · Ischemic cardiomyopathy with moderate to markedly reduced LV systolic function  · Dyslipidemia  · Anemia of chronic kidney disease  · Diabetes  · PCOS  · Fibromyalgia  · Neurogenic bladder with suprapubic catheter in place  · Obstructive sleep apnea, uncorrected  · Mild pulmonary hypertension      PLAN   Status post PCI to left circumflex artery yesterday   Will continue with dual antiplatelet therapy aspirin, Plavix 75, Lipitor would increase to 80 mg, continue with bisoprolol 2 5, Imdur 60   Will need close outpatient follow-up with Cardiology (myself) as well as Nephrology given her underlying renal disease and recent acute renal failure, now status post cardiac catheterization and PCI      Subjective:   HPI  Doing well today, no chest pain or shortness of breath  Stable from cardiovascular standpoint      Review of Systems: As noted in HPI  Rest of ROS is negative      Vitals:   /75 (BP Location: Left arm)   Pulse 63   Temp (!) 96 1 °F (35 6 °C) (Oral)   Resp 17   Ht 5' 8" (1 727 m)   Wt 128 kg (281 lb 12 oz)   SpO2 90%   BMI 42 84 kg/m²   Vitals:    07/01/22 0548 07/02/22 0600   Weight: 126 kg (277 lb 9 oz) 128 kg (281 lb 12 oz)       Intake/Output Summary (Last 24 hours) at 7/2/2022 1500  Last data filed at 7/2/2022 0701  Gross per 24 hour   Intake 1791 25 ml   Output 1975 ml   Net -183 75 ml       Physical Exam Constitutional: awake, alert and oriented, in no acute distress, no obvious deformities, obese female  Head: Normocephalic, without obvious abnormality, atraumatic  Eyes: conjunctivae clear and moist  Sclera anicteric  No xanthelasmas  Pupils equal bilaterally  Extraocular motions are full  Ear nose mouth and throat: ears are symmetrical bilaterally, hearing appears to be equal bilaterally, no nasal discharge or epistaxis, oropharynx is clear with moist mucous membranes  Neck:  Trachea is midline, neck is supple, no thyromegaly or significant lymphadenopathy, there is full range of motion  Lungs: clear to auscultation bilaterally, no wheezes, no rales, no rhonchi, no accessory muscle use, breathing is nonlabored  Heart: regular rate and rhythm, S1, S2 normal, no murmur, no click, no rub and no gallop, no lower extremity edema  Abdomen:  Obese, soft, non-tender; bowel sounds normal; no masses,  no organomegaly  Psychiatric:  Patient is oriented to time, place, person, mood/affect is negative for depression, anxiety, agitation, appears to have appropriate insight  Skin: Skin is warm, dry, intact  No obvious rashes or lesions on exposed extremities  Nail beds are pink with no cyanosis or clubbing      TELEMETRY:   Lab Results:  Results from last 7 days   Lab Units 06/30/22  0607   WBC Thousand/uL 10 06   HEMOGLOBIN g/dL 8 1*   HEMATOCRIT % 26 3*   PLATELETS Thousands/uL 310     Results from last 7 days   Lab Units 07/02/22  0510   POTASSIUM mmol/L 4 0   CHLORIDE mmol/L 107   CO2 mmol/L 25   BUN mg/dL 48*   CREATININE mg/dL 2 16*   CALCIUM mg/dL 7 9*     Results from last 7 days   Lab Units 07/02/22  0510   POTASSIUM mmol/L 4 0   CHLORIDE mmol/L 107   CO2 mmol/L 25   BUN mg/dL 48*   CREATININE mg/dL 2 16*   CALCIUM mg/dL 7 9*           Medications:    Current Facility-Administered Medications:     acetaminophen (TYLENOL) tablet 650 mg, 650 mg, Oral, Q6H PRN, Virgene Landau, MD, 650 mg at 07/01/22 1938    al mag oxide-diphenhydramine-lidocaine viscous (MAGIC MOUTHWASH) suspension 10 mL, 10 mL, Swish & Swallow, Q6H PRN, CHERELLE Zaragoza, 10 mL at 06/25/22 2129    allopurinol (ZYLOPRIM) tablet 300 mg, 300 mg, Oral, Daily, Debi House PA-C, 300 mg at 07/02/22 0850    aluminum-magnesium hydroxide-simethicone (MYLANTA) oral suspension 30 mL, 30 mL, Oral, Q4H PRN, Chasity Gambino DO, 30 mL at 06/30/22 1550    amLODIPine (NORVASC) tablet 10 mg, 10 mg, Oral, Daily, Delfina Thomas MD, 10 mg at 07/02/22 0851    aspirin chewable tablet 81 mg, 81 mg, Oral, Daily, Arlin Murguia DO, 81 mg at 07/02/22 0850    atorvastatin (LIPITOR) tablet 40 mg, 40 mg, Oral, Daily With Trina House PA-C, 40 mg at 07/01/22 1834    bisoprolol (ZEBETA) tablet 2 5 mg, 2 5 mg, Oral, Daily, Delfina Thomas MD, 2 5 mg at 07/02/22 0850    butalbital-acetaminophen-caffeine (FIORICET,ESGIC) -40 mg per tablet 1 tablet, 1 tablet, Oral, Q4H PRN, Deniz Chávez MD, 1 tablet at 07/01/22 0003    citalopram (CeleXA) tablet 40 mg, 40 mg, Oral, Daily, Debi House PA-C, 40 mg at 07/02/22 0851    clopidogrel (PLAVIX) tablet 75 mg, 75 mg, Oral, Daily, Debi House PA-C, 75 mg at 07/02/22 0851    heparin (porcine) subcutaneous injection 5,000 Units, 5,000 Units, Subcutaneous, Q8H [address], Irwin Luke MD, 5,000 Units at 07/02/22 0509    HYDROcodone-acetaminophen (NORCO) 5-325 mg per tablet 1 tablet, 1 tablet, Oral, Q6H PRN, Chasity Gambino DO, 1 tablet at 07/01/22 2258    insulin glargine (LANTUS) subcutaneous injection 30 Units 0 3 mL, 30 Units, Subcutaneous, Q12H [address], Chasity Gambino, DO, 30 Units at 07/02/22 0851    insulin lispro (HumaLOG) 100 units/mL subcutaneous injection 1-6 Units, 1-6 Units, Subcutaneous, 4x Daily (AC & HS), 2 Units at 07/01/22 2133 **AND** Fingerstick Glucose (POCT), , , 4x Daily AC and at bedtime, Debi House PA-C    insulin lispro (HumaLOG) 100 units/mL subcutaneous injection 10 Units, 10 Units, Subcutaneous, TID With Meals, Irish Pickett DO, 10 Units at 06/30/22 1656    isosorbide mononitrate (IMDUR) 24 hr tablet 60 mg, 60 mg, Oral, Daily, Debi House PA-C, 60 mg at 07/02/22 0850    levothyroxine tablet 50 mcg, 50 mcg, Oral, Early Morning, Debi House PA-C, 50 mcg at 07/02/22 0509    naloxone [facility]) injection 2 mg, 2 mg, Intravenous, Once, Irish Pickett, DO    nitroglycerin (NITROSTAT) SL tablet 0 4 mg, 0 4 mg, Sublingual, Q5 Min PRN, Margarita Grayson MD    OLANZapine (ZyPREXA) tablet 5 mg, 5 mg, Oral, HS, Debi House PA-C, 5 mg at 07/01/22 2134    ondansetron (ZOFRAN) injection 4 mg, 4 mg, Intravenous, Q6H PRN, Debi House PA-C, 4 mg at 06/28/22 5054    perflutren lipid microsphere (DEFINITY) injection, 0 2 mL/min, Intravenous, Once in imaging, Ather MD William    senna-docusate sodium (SENOKOT S) 8 6-50 mg per tablet 1 tablet, 1 tablet, Oral, BID, Irish Pickett, DO, 1 tablet at 07/02/22 0850    sorbitol 70 % solution 30 mL, 30 mL, Oral, Daily, Greysonquincy Carreran, DO, 30 mL at 07/02/22 8162    This note was completed in part utilizing [employer] Fluency Direct Software  Grammatical errors, random word insertions, spelling mistakes, and incomplete sentences may be an occasional consequence of this system secondary to software limitations, ambient noise, and hardware issues  If you have any questions or concerns about the content, text, or information contained within the body of this dictation, please contact the provider for clarification      Kaleb Ornelas DO, [facility]  7/2/2022 3:00 PM

## 2022-07-02 NOTE — PLAN OF CARE
Problem: MOBILITY - ADULT  Goal: Maintain or return to baseline ADL function  Description: INTERVENTIONS:  -  Assess patient's ability to carry out ADLs; assess patient's baseline for ADL function and identify physical deficits which impact ability to perform ADLs (bathing, care of mouth/teeth, toileting, grooming, dressing, etc )  - Assess/evaluate cause of self-care deficits   - Assess range of motion  - Assess patient's mobility; develop plan if impaired  - Assess patient's need for assistive devices and provide as appropriate  - Encourage maximum independence but intervene and supervise when necessary  - Involve family in performance of ADLs  - Assess for home care needs following discharge   - Consider OT consult to assist with ADL evaluation and planning for discharge  - Provide patient education as appropriate  Outcome: Progressing  Goal: Maintains/Returns to pre admission functional level  Description: INTERVENTIONS:  - Perform BMAT or MOVE assessment daily    - Set and communicate daily mobility goal to care team and patient/family/caregiver  - Collaborate with rehabilitation services on mobility goals if consulted  - Perform Range of Motion  times a day  - Reposition patient every hours  - Dangle patient  times a day  - Stand patient times a day  - Ambulate patient  times a day  - Out of bed to chair  times a day   - Out of bed for meals times a day  - Out of bed for toileting  - Record patient progress and toleration of activity level   Outcome: Progressing     Problem: Nutrition/Hydration-ADULT  Goal: Nutrient/Hydration intake appropriate for improving, restoring or maintaining nutritional needs  Description: Monitor and assess patient's nutrition/hydration status for malnutrition  Collaborate with interdisciplinary team and initiate plan and interventions as ordered  Monitor patient's weight and dietary intake as ordered or per policy  Utilize nutrition screening tool and intervene as necessary  Determine patient's food preferences and provide high-protein, high-caloric foods as appropriate       INTERVENTIONS:  - Monitor oral intake, urinary output, labs, and treatment plans  - Assess nutrition and hydration status and recommend course of action  - Evaluate amount of meals eaten  - Assist patient with eating if necessary   - Allow adequate time for meals  - Recommend/ encourage appropriate diets, oral nutritional supplements, and vitamin/mineral supplements  - Order, calculate, and assess calorie counts as needed  - Recommend, monitor, and adjust tube feedings and TPN/PPN based on assessed needs  - Assess need for intravenous fluids  - Provide specific nutrition/hydration education as appropriate  - Include patient/family/caregiver in decisions related to nutrition  Outcome: Progressing     Problem: Potential for Falls  Goal: Patient will remain free of falls  Description: INTERVENTIONS:  - Educate patient/family on patient safety including physical limitations  - Instruct patient to call for assistance with activity   - Consult OT/PT to assist with strengthening/mobility   - Keep Call bell within reach  - Keep bed low and locked with side rails adjusted as appropriate  - Keep care items and personal belongings within reach  - Initiate and maintain comfort rounds  - Make Fall Risk Sign visible to staff  - Offer Toileting every  Hours, in advance of need  - Initiate/Maintain alarm  - Obtain necessary fall risk management equipment:   - Apply yellow socks and bracelet for high fall risk patients  - Consider moving patient to room near nurses station  Outcome: Progressing     Problem: INFECTION - ADULT  Goal: Absence or prevention of progression during hospitalization  Description: INTERVENTIONS:  - Assess and monitor for signs and symptoms of infection  - Monitor lab/diagnostic results  - Monitor all insertion sites, i e  indwelling lines, tubes, and drains  - Monitor endotracheal if appropriate and nasal secretions for changes in amount and color  - [place] appropriate cooling/warming therapies per order  - Administer medications as ordered  - Instruct and encourage patient and family to use good hand hygiene technique  - Identify and instruct in appropriate isolation precautions for identified infection/condition  Outcome: Progressing     Problem: GENITOURINARY - ADULT  Goal: Urinary catheter remains patent  Description: INTERVENTIONS:  - Assess patency of urinary catheter  - If patient has a chronic morales, consider changing catheter if non-functioning  - Follow guidelines for intermittent irrigation of non-functioning urinary catheter  Outcome: Progressing     Problem: METABOLIC, FLUID AND ELECTROLYTES - ADULT  Goal: Fluid balance maintained  Description: INTERVENTIONS:  - Monitor labs   - Monitor I/O and WT  - Instruct patient on fluid and nutrition as appropriate  - Assess for signs & symptoms of volume excess or deficit  Outcome: Progressing  Goal: Glucose maintained within target range  Description: INTERVENTIONS:  - Monitor Blood Glucose as ordered  - Assess for signs and symptoms of hyperglycemia and hypoglycemia  - Administer ordered medications to maintain glucose within target range  - Assess nutritional intake and initiate nutrition service referral as needed  Outcome: Progressing     Problem: HEMATOLOGIC - ADULT  Goal: Maintains hematologic stability  Description: INTERVENTIONS  - Assess for signs and symptoms of bleeding or hemorrhage  - Monitor labs  - Administer supportive blood products/factors as ordered and appropriate  Outcome: Progressing     Problem: DISCHARGE PLANNING  Goal: Discharge to home or other facility with appropriate resources  Description: INTERVENTIONS:  - Identify barriers to discharge w/patient and caregiver  - Arrange for needed discharge resources and transportation as appropriate  - Identify discharge learning needs (meds, wound care, etc )  - Arrange for interpretive services to assist at discharge as needed  - Refer to Case Management Department for coordinating discharge planning if the patient needs post-hospital services based on physician/advanced practitioner order or complex needs related to functional status, cognitive ability, or social support system  Outcome: Progressing     Problem: Knowledge Deficit  Goal: Patient/family/caregiver demonstrates understanding of disease process, treatment plan, medications, and discharge instructions  Description: Complete learning assessment and assess knowledge base    Interventions:  - Provide teaching at level of understanding  - Provide teaching via preferred learning methods  Outcome: Progressing     Problem: Prexisting or High Potential for Compromised Skin Integrity  Goal: Skin integrity is maintained or improved  Description: INTERVENTIONS:  - Identify patients at risk for skin breakdown  - Assess and monitor skin integrity  - Assess and monitor nutrition and hydration status  - Monitor labs   - Assess for incontinence   - Turn and reposition patient  - Assist with mobility/ambulation  - Relieve pressure over bony prominences  - Avoid friction and shearing  - Provide appropriate hygiene as needed including keeping skin clean and dry  - Evaluate need for skin moisturizer/barrier cream  - Collaborate with interdisciplinary team   - Patient/family teaching  - Consider wound care consult   Outcome: Progressing

## 2022-07-02 NOTE — CASE MANAGEMENT
Case Management Discharge Planning Note    Patient name Cooper Lion  Location [facility] 4 /E[address] 450-* MRN [id number]  : 1960 Date 2022       Current Admission Date: 2022  Current Admission Diagnosis:CAD (coronary artery disease)   Patient Active Problem List    Diagnosis Date Noted    Hyponatremia 2022    History of anemia due to chronic kidney disease 2022    Bradycardia 2022    Syncope 2022    Acute renal failure superimposed on chronic kidney disease ([facility] ) 2022    Chest pain 2022    Elevated troponin I level 2022    Hypotension 2022    [facility]ED [facility]) [facility] 2022    Encounter for examination following treatment at hospital 2022    Other artificial openings of urinary tract status ([facility] ) 02/10/2022    Continuous opioid dependence ([facility] ) 02/10/2022    Adrenal nodule ([facility] ) 02/10/2022    Stable angina ([facility] ) 02/10/2022    Volume overload 02/10/2022    Acquired hypothyroidism 10/14/2021    Mixed hyperlipidemia 10/14/2021    Gastroesophageal reflux disease without esophagitis 10/13/2021    Other proteinuria 2021    Chronic combined systolic and diastolic CHF (congestive heart failure) ([facility] ) 2021    Prolonged QT interval 2021    Urinary tract infection associated with cystostomy catheter ([facility] ) 2021    Neurogenic bladder 2021    Morbid obesity with BMI of 45 0-49 9, adult ([facility] ) 06/15/2021    History of heart artery stent 06/15/2021    Stage 4 chronic kidney disease ([facility] ) 2021    Memory impairment 2021    Chronic bronchitis ([facility] ) 2021    Severe episode of recurrent major depressive disorder, without psychotic features ([facility] ) 2021    Skin ulcer of hand, limited to breakdown of skin ([facility] ) 2021    HTN (hypertension) 2020    Diabetic ulcer of toe of right foot associated with type 2 diabetes mellitus, with muscle involvement without evidence of necrosis ([facility] ) 11/25/2020    Head lump 11/11/2020    Anemia 09/09/2020    Abnormal LFTs 09/09/2020    S/P cervical spinal fusion 09/09/2020    Major depressive disorder 09/02/2020    Type 2 diabetes mellitus with stage 4 chronic kidney disease, with long-term current use of insulin ([facility] ) 09/02/2020    Anxiety 09/02/2020    CAD (coronary artery disease) 09/02/2020    Osteoarthritis of left knee 09/02/2020    Cellulitis of finger of right hand 08/26/2020      LOS (days): 10  Geometric Mean LOS (GMLOS) (days): 3 70  Days to GMLOS:-6 8     OBJECTIVE:  Risk of Unplanned Readmission Score: 53 56         Current admission status: Inpatient   Preferred Pharmacy:   [address] [place] [address], [address] [place] [address] Drive [facility]  [address] [place] [address]  Phone: [phone] Fax: [phone]    Primary Care Provider: CHERELLE Ortiz    Primary Insurance: [facility]  Secondary Insurance: [employer]    DISCHARGE DETAILS:    Discharge planning discussed with[de-identified] Rolando Faith (Daughter)   [phone] (M)  Freedom of Choice: Yes  Comments - Freedom of Choice: Pt will discharge home today with [facility] for PT/OT at home  Str Yenny informed of same  Str will transport Pt home    CM contacted family/caregiver?: Yes  Were Treatment Team discharge recommendations reviewed with patient/caregiver?: Yes  Did patient/caregiver verbalize understanding of patient care needs?: Yes  Were patient/caregiver advised of the risks associated with not following Treatment Team discharge recommendations?: Yes    Contacts  Patient Contacts: Rolando Faith (Daughter)   [phone] (M)  Relationship to Patient[de-identified] Family  Contact Method: Phone  Phone Number: Rolando Faith (Daughter)   [phone] Lizzie Mccord  Reason/Outcome: Continuity of Care, Emergency Contact, Discharge [address]         Is the patient interested in [facility] at discharge?: Yes  [address] requested[de-identified] Occupational Therapy, Physical R Zhen [address] Agency Name[de-identified] Other [facility])  [address] Provider[de-identified] PCP  Home Health Services Needed[de-identified] Evaluate Functional Status and Safety, Gait/ADL Training, Strengthening/Theraputic Exercises to Improve Function  Homebound Criteria Met[de-identified] Requires the Assistance of Another Person for Safe Ambulation or to Leave the Home, Uses an Assist Device (i e  cane, walker, etc)  Supporting Clincal Findings[de-identified] Fatigues Easliy in [employer], Limited Endurance    DME Referral Provided  Referral made for DME?: No    Treatment Team Recommendation: Home with [facility]  Discharge Destination Plan[de-identified] Home with Shu at Discharge : Automobile  Transfer Mode: Arya Porras by: Family member

## 2022-07-02 NOTE — DISCHARGE SUMMARY
Discharge Summary - Jarrett Ang, 1960, [id number]        Admission Date: 6/21/2022  Discharge Date:  7/2/2022    Admitting Diagnosis: Bradycardia [R00 1]  Syncope [R55]  Hypotension [I95 9]  Neurogenic bladder [N31 9]  NSTEMI (non-ST elevated myocardial infarction) ([facility] ) [I21 4]  Chronic anemia [D64 9]  ALFRED (acute kidney injury) [facility] ) [N17 9]  Elevated troponin I level [R77 8]  Acute renal failure superimposed on chronic kidney disease ([facility] ) [N17 9, N18 9]    Discharge Diagnosis:   1  Syncope secondary to orthostatic hypotension  2  Gastroenteritis  3  Acute kidney injury superimposed on chronic kidney disease stage 4  4  Elevated troponin  5  Coronary artery disease  6  Chronic combined systolic and diastolic congestive heart failure  7  Bradycardia secondary to beta-blockers  8  Hyponatremia  9  Type 2 diabetes with neuropathy  10  Neurogenic bladder with indwelling [place]   11  Severe obesity     Consulting Physicians:  1  Dr Niya Zhu, cardiology  2  Dr Rachael Brenner, nephrology     Procedures Performed:   Cardiac catheterization with angioplasty and stenting of the circumflex coronary artery    HPI:  The patient is a 53-year-old woman who had several days of nausea and diarrhea  She had no vomiting but she was not able to eat or drink well  She was evaluated in the emergency room on June 19th  She was treated and released but continued to feel poorly  On June 21st she had some left-sided chest discomfort  EMS was summoned  While they were there she became hypotensive and had a syncopal episode  She was brought to the emergency room  She was found to have acute kidney injury  Her blood pressure was low  She was admitted for further care  Hospital Course: The patient was admitted and monitored carefully on telemetry  She was gently hydrated with intravenous fluid  She was evaluated by Nephrology  With hydration, the patient's creatinine gradually improved and returned to baseline    The patient's GI symptoms resolved with supportive care  The patient's initial troponin was markedly elevated  She did have 1 episode of chest pain immediately before her admission  She was seen by Cardiology  The patient's subsequent troponins actually went down  Because of this, Cardiology did not think that she actually had an MI  They did recommend repeating an echo  This showed a significant reduction in her ejection fraction compared to her previous study  Therefore, stress test was performed which revealed multiple areas of reversible ischemia  Cardiac catheterization was recommended  After appropriate fluid administration guided by Nephrology, the patient was taken to the catheterization lab on July 1st   The patient was found to have a flow significant stenosis of a large circumflex artery  This was stented with a drug-eluting stent  Residual stenosis was 0  The patient tolerated the procedure well  Her creatinine remained stable despite dye administration  The patient's other issues remained stable during her hospitalization  On the day of discharge the patient was feeling well  Vital signs were stable  Lungs were clear  Cardiac exam revealed regular rhythm  The abdomen was soft and nontender  There was no edema  Disposition:  The patient was discharged home on July 2nd  She was asked to continue a diabetic diet  Her activity will be as tolerated  She was asked to arrange follow-up with her primary care provider within 1 week  She will be re-evaluated by Cardiology in about 2 weeks  Discharge instructions/Information to patient and family:   See after visit summary for information provided to patient and family  Provisions for Follow-Up Care:  See after visit summary for information related to follow-up care and any pertinent home health orders  Planned Readmission: No    Discharge Statement   I spent 40 minutes discharging the patient   This time was spent on the day of discharge  I had direct contact with the patient on the day of discharge  Discharge Medications:  See after visit summary for reconciled discharge medications provided to patient and family

## 2022-07-02 NOTE — NURSING NOTE
IV/Tele removed prior to discharge  Discharge instructions reviewed with daughter at bedside  Suprapubic morales remained in place

## 2022-07-02 NOTE — ARC ADMISSION
Per Case Management Bob Meyer, the patient will dc home with [facility] with home therapies as recommended   LAURIE Truong

## 2022-07-02 NOTE — PLAN OF CARE
Problem: MOBILITY - ADULT  Goal: Maintain or return to baseline ADL function  Description: INTERVENTIONS:  -  Assess patient's ability to carry out ADLs; assess patient's baseline for ADL function and identify physical deficits which impact ability to perform ADLs (bathing, care of mouth/teeth, toileting, grooming, dressing, etc )  - Assess/evaluate cause of self-care deficits   - Assess range of motion  - Assess patient's mobility; develop plan if impaired  - Assess patient's need for assistive devices and provide as appropriate  - Encourage maximum independence but intervene and supervise when necessary  - Involve family in performance of ADLs  - Assess for home care needs following discharge   - Consider OT consult to assist with ADL evaluation and planning for discharge  - Provide patient education as appropriate  Outcome: Progressing  Goal: Maintains/Returns to pre admission functional level  Description: INTERVENTIONS:  - Perform BMAT or MOVE assessment daily    - Set and communicate daily mobility goal to care team and patient/family/caregiver  - Collaborate with rehabilitation services on mobility goals if consulted  - Perform Range of Motion 3 times a day  - Reposition patient every 2 hours  - Dangle patient 3 times a day  - Stand patient 3 times a day  - Ambulate patient 3 times a day  - Out of bed to chair 3 times a day   - Out of bed for meals 3 times a day  - Out of bed for toileting  - Record patient progress and toleration of activity level   Outcome: Progressing     Problem: Nutrition/Hydration-ADULT  Goal: Nutrient/Hydration intake appropriate for improving, restoring or maintaining nutritional needs  Description: Monitor and assess patient's nutrition/hydration status for malnutrition  Collaborate with interdisciplinary team and initiate plan and interventions as ordered  Monitor patient's weight and dietary intake as ordered or per policy   Utilize nutrition screening tool and intervene as necessary  Determine patient's food preferences and provide high-protein, high-caloric foods as appropriate       INTERVENTIONS:  - Monitor oral intake, urinary output, labs, and treatment plans  - Assess nutrition and hydration status and recommend course of action  - Evaluate amount of meals eaten  - Assist patient with eating if necessary   - Allow adequate time for meals  - Recommend/ encourage appropriate diets, oral nutritional supplements, and vitamin/mineral supplements  - Order, calculate, and assess calorie counts as needed  - Recommend, monitor, and adjust tube feedings and TPN/PPN based on assessed needs  - Assess need for intravenous fluids  - Provide specific nutrition/hydration education as appropriate  - Include patient/family/caregiver in decisions related to nutrition  Outcome: Progressing     Problem: Potential for Falls  Goal: Patient will remain free of falls  Description: INTERVENTIONS:  - Educate patient/family on patient safety including physical limitations  - Instruct patient to call for assistance with activity   - Consult OT/PT to assist with strengthening/mobility   - Keep Call bell within reach  - Keep bed low and locked with side rails adjusted as appropriate  - Keep care items and personal belongings within reach  - Initiate and maintain comfort rounds  - Make Fall Risk Sign visible to staff  - Offer Toileting every 2 Hours, in advance of need  - Apply yellow socks and bracelet for high fall risk patients  - Consider moving patient to room near nurses station  Outcome: Progressing     Problem: INFECTION - ADULT  Goal: Absence or prevention of progression during hospitalization  Description: INTERVENTIONS:  - Assess and monitor for signs and symptoms of infection  - Monitor lab/diagnostic results  - Monitor all insertion sites, i e  indwelling lines, tubes, and drains  - Monitor endotracheal if appropriate and nasal secretions for changes in amount and color  - [place] appropriate cooling/warming therapies per order  - Administer medications as ordered  - Instruct and encourage patient and family to use good hand hygiene technique  - Identify and instruct in appropriate isolation precautions for identified infection/condition  Outcome: Progressing     Problem: GENITOURINARY - ADULT  Goal: Urinary catheter remains patent  Description: INTERVENTIONS:  - Assess patency of urinary catheter  - If patient has a chronic morales, consider changing catheter if non-functioning  - Follow guidelines for intermittent irrigation of non-functioning urinary catheter  Outcome: Progressing     Problem: METABOLIC, FLUID AND ELECTROLYTES - ADULT  Goal: Fluid balance maintained  Description: INTERVENTIONS:  - Monitor labs   - Monitor I/O and WT  - Instruct patient on fluid and nutrition as appropriate  - Assess for signs & symptoms of volume excess or deficit  Outcome: Progressing  Goal: Glucose maintained within target range  Description: INTERVENTIONS:  - Monitor Blood Glucose as ordered  - Assess for signs and symptoms of hyperglycemia and hypoglycemia  - Administer ordered medications to maintain glucose within target range  - Assess nutritional intake and initiate nutrition service referral as needed  Outcome: Progressing     Problem: HEMATOLOGIC - ADULT  Goal: Maintains hematologic stability  Description: INTERVENTIONS  - Assess for signs and symptoms of bleeding or hemorrhage  - Monitor labs  - Administer supportive blood products/factors as ordered and appropriate  Outcome: Progressing     Problem: DISCHARGE PLANNING  Goal: Discharge to home or other facility with appropriate resources  Description: INTERVENTIONS:  - Identify barriers to discharge w/patient and caregiver  - Arrange for needed discharge resources and transportation as appropriate  - Identify discharge learning needs (meds, wound care, etc )  - Arrange for interpretive services to assist at discharge as needed  - Refer to Case Management Department for coordinating discharge planning if the patient needs post-hospital services based on physician/advanced practitioner order or complex needs related to functional status, cognitive ability, or social support system  Outcome: Progressing     Problem: Knowledge Deficit  Goal: Patient/family/caregiver demonstrates understanding of disease process, treatment plan, medications, and discharge instructions  Description: Complete learning assessment and assess knowledge base    Interventions:  - Provide teaching at level of understanding  - Provide teaching via preferred learning methods  Outcome: Progressing     Problem: Prexisting or High Potential for Compromised Skin Integrity  Goal: Skin integrity is maintained or improved  Description: INTERVENTIONS:  - Identify patients at risk for skin breakdown  - Assess and monitor skin integrity  - Assess and monitor nutrition and hydration status  - Monitor labs   - Assess for incontinence   - Turn and reposition patient  - Assist with mobility/ambulation  - Relieve pressure over bony prominences  - Avoid friction and shearing  - Provide appropriate hygiene as needed including keeping skin clean and dry  - Evaluate need for skin moisturizer/barrier cream  - Collaborate with interdisciplinary team   - Patient/family teaching  - Consider wound care consult   Outcome: Progressing

## 2022-07-03 DIAGNOSIS — E11.42 TYPE 2 DIABETES MELLITUS WITH DIABETIC POLYNEUROPATHY, WITH LONG-TERM CURRENT USE OF INSULIN (HCC): ICD-10-CM

## 2022-07-03 DIAGNOSIS — Z79.4 TYPE 2 DIABETES MELLITUS WITH DIABETIC POLYNEUROPATHY, WITH LONG-TERM CURRENT USE OF INSULIN (HCC): ICD-10-CM

## 2022-07-05 ENCOUNTER — PATIENT OUTREACH (OUTPATIENT)
Dept: FAMILY MEDICINE CLINIC | Facility: CLINIC | Age: 62
End: 2022-07-05

## 2022-07-05 NOTE — PROGRESS NOTES
I called the patient to follow up after an 11 day hospital stay  The patient stated she is feeling much better  She notes she now has a total of 6 stents  The patient does report chest pain that "comes and goes" and rates it as "not bad"  She notes it is more of a "tightness"  She also reports shortness of breath with exertion and it takes quite some time to resolve after rest   The patient notes she has not been walking in her apartment much  The patient does not recall the topic of home oxygen being brought up during her stay  The patient reports edema of bilateral ankles/feet, L>R  She did note she will be elevating her legs after our call  She states she does not add salt to her meals but admits to eating some processed foods and realizes the sodium content is higher  I advised her to cut back on these meals and try to eat more fresh foods  The patient states she is taking her diuretic  She did not have a weight reading for me today but I asked her to check her weights every morning and record  The patient states she believes she has medications waiting to be picked up from the pharmacy  She stated her daughter will pick them up today or tomorrow  I stressed for her to pick them up today since the patient had been discharged 3 days ago  The patient stated she was very lonely during her hospital stay and has decided to move back to [place] in a few months  She states her daughter will most likely stay in [place]  The patient notes she misses her friends and family  The patient is aware of her Cardiology appointment for tomorrow but is in need of a hospital follow up; will send note to clerical     I will continue to follow  Chart reviewed

## 2022-07-05 NOTE — UTILIZATION REVIEW
Notification of Discharge   This is a Notification of Discharge from our facility [address]  Please be advised that this patient has been discharge from our facility  Below you will find the admission and discharge date and time including the patients disposition  UTILIZATION REVIEW CONTACT:  [address] [place]  Utilization   Network Utilization Review Department  Phone: [phone] x carefully listen to the prompts  All voicemails are confidential   Email: [email or web address]     PHYSICIAN ADVISORY SERVICES:  FOR [id number] REVIEW - MEDICAL NECESSITY DENIAL  Phone: [phone]  Fax: [phone]  Email: [email or web address]     PRESENTATION DATE: 6/21/2022 10:21 PM  OBERVATION ADMISSION DATE:  INPATIENT ADMISSION DATE: 6/22/22 12:02 AM   DISCHARGE DATE: 7/2/2022  6:12 PM  DISPOSITION: Home with [place] with Home Health Care      IMPORTANT INFORMATION:  Send all requests for admission clinical reviews, approved or denied determinations and any other requests to dedicated fax number below belonging to the campus where the patient is receiving treatment   List of dedicated fax numbers:  [address] [place] [address] DENIALS (Administrative/Medical Necessity) [phone]   [address] [place] [address] (Maternity/NICU/Pediatrics) [phone]   [facility] [phone]   [address] [facility] [phone]   [address] [place] [address] [phone]   [address] [place] [address] [address] [place] [address],[address] [phone]   [facility]  [phone]   [address] [place] [address]  [address] [facility] [address] [facility] [phone]

## 2022-07-06 ENCOUNTER — HOSPITAL ENCOUNTER (INPATIENT)
Facility: HOSPITAL | Age: 62
LOS: 13 days | Discharge: HOME | DRG: 291 | End: 2022-07-19
Attending: EMERGENCY MEDICINE | Admitting: INTERNAL MEDICINE
Payer: COMMERCIAL

## 2022-07-06 ENCOUNTER — APPOINTMENT (EMERGENCY)
Dept: RADIOLOGY | Facility: HOSPITAL | Age: 62
DRG: 291 | End: 2022-07-06
Payer: COMMERCIAL

## 2022-07-06 ENCOUNTER — PATIENT OUTREACH (OUTPATIENT)
Dept: FAMILY MEDICINE CLINIC | Facility: CLINIC | Age: 62
End: 2022-07-06

## 2022-07-06 ENCOUNTER — OFFICE VISIT (OUTPATIENT)
Dept: CARDIOLOGY CLINIC | Facility: CLINIC | Age: 62
End: 2022-07-06
Payer: COMMERCIAL

## 2022-07-06 VITALS
WEIGHT: 293 LBS | HEART RATE: 67 BPM | SYSTOLIC BLOOD PRESSURE: 120 MMHG | DIASTOLIC BLOOD PRESSURE: 60 MMHG | BODY MASS INDEX: 45.61 KG/M2

## 2022-07-06 DIAGNOSIS — R77.8 ELEVATED TROPONIN: ICD-10-CM

## 2022-07-06 DIAGNOSIS — I50.23 ACUTE ON CHRONIC SYSTOLIC HEART FAILURE (HCC): ICD-10-CM

## 2022-07-06 DIAGNOSIS — E16.2 HYPOGLYCEMIA: ICD-10-CM

## 2022-07-06 DIAGNOSIS — F41.9 ANXIETY: ICD-10-CM

## 2022-07-06 DIAGNOSIS — I10 HYPERTENSION, UNSPECIFIED TYPE: ICD-10-CM

## 2022-07-06 DIAGNOSIS — I50.42 CHRONIC COMBINED SYSTOLIC AND DIASTOLIC CHF (CONGESTIVE HEART FAILURE) (HCC): ICD-10-CM

## 2022-07-06 DIAGNOSIS — I50.9 CHF (CONGESTIVE HEART FAILURE) (HCC): Primary | ICD-10-CM

## 2022-07-06 DIAGNOSIS — Z98.1 S/P CERVICAL SPINAL FUSION: ICD-10-CM

## 2022-07-06 DIAGNOSIS — E11.621 DIABETIC ULCER OF TOE OF RIGHT FOOT ASSOCIATED WITH TYPE 2 DIABETES MELLITUS, WITH MUSCLE INVOLVEMENT WITHOUT EVIDENCE OF NECROSIS (HCC): ICD-10-CM

## 2022-07-06 DIAGNOSIS — N18.4 STAGE 4 CHRONIC KIDNEY DISEASE (HCC): ICD-10-CM

## 2022-07-06 DIAGNOSIS — N17.9 AKI (ACUTE KIDNEY INJURY) (HCC): ICD-10-CM

## 2022-07-06 DIAGNOSIS — N18.9 CKD (CHRONIC KIDNEY DISEASE): ICD-10-CM

## 2022-07-06 DIAGNOSIS — T83.510D URINARY TRACT INFECTION ASSOCIATED WITH CYSTOSTOMY CATHETER, SUBSEQUENT ENCOUNTER: ICD-10-CM

## 2022-07-06 DIAGNOSIS — L97.515 DIABETIC ULCER OF TOE OF RIGHT FOOT ASSOCIATED WITH TYPE 2 DIABETES MELLITUS, WITH MUSCLE INVOLVEMENT WITHOUT EVIDENCE OF NECROSIS (HCC): ICD-10-CM

## 2022-07-06 DIAGNOSIS — E11.621 TYPE 2 DIABETES MELLITUS WITH FOOT ULCER, WITH LONG-TERM CURRENT USE OF INSULIN (HCC): ICD-10-CM

## 2022-07-06 DIAGNOSIS — Z79.4 TYPE 2 DIABETES MELLITUS WITH FOOT ULCER, WITH LONG-TERM CURRENT USE OF INSULIN (HCC): ICD-10-CM

## 2022-07-06 DIAGNOSIS — R06.00 DYSPNEA: ICD-10-CM

## 2022-07-06 DIAGNOSIS — Z79.4 TYPE 2 DIABETES MELLITUS WITH DIABETIC POLYNEUROPATHY, WITH LONG-TERM CURRENT USE OF INSULIN (HCC): ICD-10-CM

## 2022-07-06 DIAGNOSIS — E11.42 TYPE 2 DIABETES MELLITUS WITH DIABETIC POLYNEUROPATHY, WITH LONG-TERM CURRENT USE OF INSULIN (HCC): ICD-10-CM

## 2022-07-06 DIAGNOSIS — I21.4 NSTEMI (NON-ST ELEVATED MYOCARDIAL INFARCTION) (HCC): Primary | ICD-10-CM

## 2022-07-06 DIAGNOSIS — N18.5 ACUTE RENAL FAILURE SUPERIMPOSED ON STAGE 5 CHRONIC KIDNEY DISEASE, NOT ON CHRONIC DIALYSIS, UNSPECIFIED ACUTE RENAL FAILURE TYPE (HCC): ICD-10-CM

## 2022-07-06 DIAGNOSIS — L97.509 TYPE 2 DIABETES MELLITUS WITH FOOT ULCER, WITH LONG-TERM CURRENT USE OF INSULIN (HCC): ICD-10-CM

## 2022-07-06 DIAGNOSIS — I25.10 CORONARY ARTERY DISEASE INVOLVING NATIVE CORONARY ARTERY OF NATIVE HEART WITHOUT ANGINA PECTORIS: ICD-10-CM

## 2022-07-06 DIAGNOSIS — N39.0 URINARY TRACT INFECTION ASSOCIATED WITH CYSTOSTOMY CATHETER, SUBSEQUENT ENCOUNTER: ICD-10-CM

## 2022-07-06 DIAGNOSIS — R07.9 CHEST PAIN: ICD-10-CM

## 2022-07-06 DIAGNOSIS — N17.9 ACUTE RENAL FAILURE SUPERIMPOSED ON STAGE 5 CHRONIC KIDNEY DISEASE, NOT ON CHRONIC DIALYSIS, UNSPECIFIED ACUTE RENAL FAILURE TYPE (HCC): ICD-10-CM

## 2022-07-06 LAB
2HR DELTA HS TROPONIN: -14 NG/L
4HR DELTA HS TROPONIN: -9 NG/L
ALBUMIN SERPL BCP-MCNC: 2.9 G/DL (ref 3.5–5)
ALP SERPL-CCNC: 113 U/L (ref 46–116)
ALT SERPL W P-5'-P-CCNC: 30 U/L (ref 12–78)
ANION GAP SERPL CALCULATED.3IONS-SCNC: 15 MMOL/L (ref 4–13)
APTT PPP: 30 SECONDS (ref 23–37)
AST SERPL W P-5'-P-CCNC: 11 U/L (ref 5–45)
ATRIAL RATE: 58 BPM
ATRIAL RATE: 66 BPM
ATRIAL RATE: 68 BPM
BASOPHILS # BLD AUTO: 0.08 THOUSANDS/ΜL (ref 0–0.1)
BASOPHILS NFR BLD AUTO: 1 % (ref 0–1)
BILIRUB SERPL-MCNC: 0.2 MG/DL (ref 0.2–1)
BUN SERPL-MCNC: 64 MG/DL (ref 5–25)
CALCIUM ALBUM COR SERPL-MCNC: 8.9 MG/DL (ref 8.3–10.1)
CALCIUM SERPL-MCNC: 8 MG/DL (ref 8.3–10.1)
CARDIAC TROPONIN I PNL SERPL HS: 115 NG/L
CARDIAC TROPONIN I PNL SERPL HS: 120 NG/L
CARDIAC TROPONIN I PNL SERPL HS: 129 NG/L
CHLORIDE SERPL-SCNC: 107 MMOL/L (ref 100–108)
CO2 SERPL-SCNC: 24 MMOL/L (ref 21–32)
CREAT SERPL-MCNC: 2.94 MG/DL (ref 0.6–1.3)
EOSINOPHIL # BLD AUTO: 0.18 THOUSAND/ΜL (ref 0–0.61)
EOSINOPHIL NFR BLD AUTO: 2 % (ref 0–6)
ERYTHROCYTE [DISTWIDTH] IN BLOOD BY AUTOMATED COUNT: 16.7 % (ref 11.6–15.1)
FLUAV RNA RESP QL NAA+PROBE: NEGATIVE
FLUBV RNA RESP QL NAA+PROBE: NEGATIVE
GFR SERPL CREATININE-BSD FRML MDRD: 16 ML/MIN/1.73SQ M
GLUCOSE SERPL-MCNC: 121 MG/DL (ref 65–140)
GLUCOSE SERPL-MCNC: 42 MG/DL (ref 65–140)
GLUCOSE SERPL-MCNC: 46 MG/DL (ref 65–140)
GLUCOSE SERPL-MCNC: 55 MG/DL (ref 65–140)
GLUCOSE SERPL-MCNC: 56 MG/DL (ref 65–140)
GLUCOSE SERPL-MCNC: 74 MG/DL (ref 65–140)
GLUCOSE SERPL-MCNC: 84 MG/DL (ref 65–140)
HCT VFR BLD AUTO: 27.1 % (ref 34.8–46.1)
HGB BLD-MCNC: 8.5 G/DL (ref 11.5–15.4)
IMM GRANULOCYTES # BLD AUTO: 0.04 THOUSAND/UL (ref 0–0.2)
IMM GRANULOCYTES NFR BLD AUTO: 0 % (ref 0–2)
INR PPP: 1.05 (ref 0.84–1.19)
LYMPHOCYTES # BLD AUTO: 1.83 THOUSANDS/ΜL (ref 0.6–4.47)
LYMPHOCYTES NFR BLD AUTO: 17 % (ref 14–44)
MCH RBC QN AUTO: 32.4 PG (ref 26.8–34.3)
MCHC RBC AUTO-ENTMCNC: 31.4 G/DL (ref 31.4–37.4)
MCV RBC AUTO: 103 FL (ref 82–98)
MONOCYTES # BLD AUTO: 1.03 THOUSAND/ΜL (ref 0.17–1.22)
MONOCYTES NFR BLD AUTO: 9 % (ref 4–12)
NEUTROPHILS # BLD AUTO: 7.83 THOUSANDS/ΜL (ref 1.85–7.62)
NEUTS SEG NFR BLD AUTO: 71 % (ref 43–75)
NRBC BLD AUTO-RTO: 0 /100 WBCS
NT-PROBNP SERPL-MCNC: ABNORMAL PG/ML
P AXIS: 55 DEGREES
P AXIS: 56 DEGREES
P AXIS: 59 DEGREES
PLATELET # BLD AUTO: 351 THOUSANDS/UL (ref 149–390)
PMV BLD AUTO: 9.7 FL (ref 8.9–12.7)
POTASSIUM SERPL-SCNC: 4.1 MMOL/L (ref 3.5–5.3)
PR INTERVAL: 148 MS
PR INTERVAL: 162 MS
PR INTERVAL: 164 MS
PROT SERPL-MCNC: 6.8 G/DL (ref 6.4–8.2)
PROTHROMBIN TIME: 13.4 SECONDS (ref 11.6–14.5)
QRS AXIS: -1 DEGREES
QRS AXIS: 59 DEGREES
QRS AXIS: 8 DEGREES
QRSD INTERVAL: 146 MS
QRSD INTERVAL: 152 MS
QRSD INTERVAL: 168 MS
QT INTERVAL: 414 MS
QT INTERVAL: 430 MS
QT INTERVAL: 652 MS
QTC INTERVAL: 434 MS
QTC INTERVAL: 457 MS
QTC INTERVAL: 640 MS
RBC # BLD AUTO: 2.62 MILLION/UL (ref 3.81–5.12)
RSV RNA RESP QL NAA+PROBE: NEGATIVE
SARS-COV-2 RNA RESP QL NAA+PROBE: NEGATIVE
SODIUM SERPL-SCNC: 146 MMOL/L (ref 136–145)
T WAVE AXIS: 1 DEGREES
T WAVE AXIS: 147 DEGREES
T WAVE AXIS: 198 DEGREES
VENTRICULAR RATE: 58 BPM
VENTRICULAR RATE: 66 BPM
VENTRICULAR RATE: 68 BPM
WBC # BLD AUTO: 10.99 THOUSAND/UL (ref 4.31–10.16)

## 2022-07-06 PROCEDURE — 96374 THER/PROPH/DIAG INJ IV PUSH: CPT

## 2022-07-06 PROCEDURE — 83880 ASSAY OF NATRIURETIC PEPTIDE: CPT | Performed by: EMERGENCY MEDICINE

## 2022-07-06 PROCEDURE — 99285 EMERGENCY DEPT VISIT HI MDM: CPT

## 2022-07-06 PROCEDURE — 93000 ELECTROCARDIOGRAM COMPLETE: CPT

## 2022-07-06 PROCEDURE — 93010 ELECTROCARDIOGRAM REPORT: CPT | Performed by: INTERNAL MEDICINE

## 2022-07-06 PROCEDURE — 85730 THROMBOPLASTIN TIME PARTIAL: CPT | Performed by: EMERGENCY MEDICINE

## 2022-07-06 PROCEDURE — 85025 COMPLETE CBC W/AUTO DIFF WBC: CPT | Performed by: EMERGENCY MEDICINE

## 2022-07-06 PROCEDURE — 0241U HB NFCT DS VIR RESP RNA 4 TRGT: CPT | Performed by: PHYSICIAN ASSISTANT

## 2022-07-06 PROCEDURE — 85610 PROTHROMBIN TIME: CPT | Performed by: EMERGENCY MEDICINE

## 2022-07-06 PROCEDURE — 93005 ELECTROCARDIOGRAM TRACING: CPT

## 2022-07-06 PROCEDURE — 84484 ASSAY OF TROPONIN QUANT: CPT | Performed by: EMERGENCY MEDICINE

## 2022-07-06 PROCEDURE — 3078F DIAST BP <80 MM HG: CPT

## 2022-07-06 PROCEDURE — 82948 REAGENT STRIP/BLOOD GLUCOSE: CPT

## 2022-07-06 PROCEDURE — 3074F SYST BP LT 130 MM HG: CPT

## 2022-07-06 PROCEDURE — 99285 EMERGENCY DEPT VISIT HI MDM: CPT | Performed by: EMERGENCY MEDICINE

## 2022-07-06 PROCEDURE — 80053 COMPREHEN METABOLIC PANEL: CPT | Performed by: EMERGENCY MEDICINE

## 2022-07-06 PROCEDURE — 99215 OFFICE O/P EST HI 40 MIN: CPT

## 2022-07-06 PROCEDURE — 71045 X-RAY EXAM CHEST 1 VIEW: CPT

## 2022-07-06 PROCEDURE — 36415 COLL VENOUS BLD VENIPUNCTURE: CPT

## 2022-07-06 PROCEDURE — 3066F NEPHROPATHY DOC TX: CPT

## 2022-07-06 PROCEDURE — 99223 1ST HOSP IP/OBS HIGH 75: CPT | Performed by: INTERNAL MEDICINE

## 2022-07-06 RX ORDER — OLANZAPINE 5 MG/1
5 TABLET ORAL
Status: DISCONTINUED | OUTPATIENT
Start: 2022-07-06 | End: 2022-07-19 | Stop reason: HOSPADM

## 2022-07-06 RX ORDER — ASPIRIN 81 MG/1
81 TABLET ORAL DAILY
Status: DISCONTINUED | OUTPATIENT
Start: 2022-07-07 | End: 2022-07-19 | Stop reason: HOSPADM

## 2022-07-06 RX ORDER — ATORVASTATIN CALCIUM 40 MG/1
40 TABLET, FILM COATED ORAL DAILY
Status: DISCONTINUED | OUTPATIENT
Start: 2022-07-07 | End: 2022-07-19 | Stop reason: HOSPADM

## 2022-07-06 RX ORDER — CITALOPRAM 20 MG/1
40 TABLET ORAL DAILY
Status: DISCONTINUED | OUTPATIENT
Start: 2022-07-07 | End: 2022-07-19 | Stop reason: HOSPADM

## 2022-07-06 RX ORDER — AMLODIPINE BESYLATE 10 MG/1
10 TABLET ORAL DAILY
Status: DISCONTINUED | OUTPATIENT
Start: 2022-07-07 | End: 2022-07-11

## 2022-07-06 RX ORDER — INSULIN LISPRO 100 [IU]/ML
2-12 INJECTION, SOLUTION INTRAVENOUS; SUBCUTANEOUS
Status: DISCONTINUED | OUTPATIENT
Start: 2022-07-07 | End: 2022-07-07

## 2022-07-06 RX ORDER — DEXTROSE MONOHYDRATE 25 G/50ML
25 INJECTION, SOLUTION INTRAVENOUS ONCE
Status: COMPLETED | OUTPATIENT
Start: 2022-07-06 | End: 2022-07-06

## 2022-07-06 RX ORDER — LEVOTHYROXINE SODIUM 0.05 MG/1
50 TABLET ORAL
Status: DISCONTINUED | OUTPATIENT
Start: 2022-07-07 | End: 2022-07-19 | Stop reason: HOSPADM

## 2022-07-06 RX ORDER — ALLOPURINOL 100 MG/1
300 TABLET ORAL DAILY
Status: DISCONTINUED | OUTPATIENT
Start: 2022-07-07 | End: 2022-07-19 | Stop reason: HOSPADM

## 2022-07-06 RX ORDER — ISOSORBIDE MONONITRATE 60 MG/1
60 TABLET, EXTENDED RELEASE ORAL DAILY
Status: DISCONTINUED | OUTPATIENT
Start: 2022-07-07 | End: 2022-07-11

## 2022-07-06 RX ORDER — CLOPIDOGREL BISULFATE 75 MG/1
75 TABLET ORAL DAILY
Status: DISCONTINUED | OUTPATIENT
Start: 2022-07-07 | End: 2022-07-19 | Stop reason: HOSPADM

## 2022-07-06 RX ORDER — PANTOPRAZOLE SODIUM 40 MG/1
40 TABLET, DELAYED RELEASE ORAL
Status: DISCONTINUED | OUTPATIENT
Start: 2022-07-06 | End: 2022-07-19 | Stop reason: HOSPADM

## 2022-07-06 RX ORDER — TIZANIDINE 4 MG/1
4 TABLET ORAL EVERY 8 HOURS PRN
Status: DISCONTINUED | OUTPATIENT
Start: 2022-07-06 | End: 2022-07-19 | Stop reason: HOSPADM

## 2022-07-06 RX ORDER — HYDROCODONE BITARTRATE AND ACETAMINOPHEN 5; 325 MG/1; MG/1
1 TABLET ORAL 3 TIMES DAILY PRN
Status: DISCONTINUED | OUTPATIENT
Start: 2022-07-06 | End: 2022-07-19 | Stop reason: HOSPADM

## 2022-07-06 RX ORDER — NYSTATIN 100000 [USP'U]/G
POWDER TOPICAL 2 TIMES DAILY
Status: DISCONTINUED | OUTPATIENT
Start: 2022-07-06 | End: 2022-07-19 | Stop reason: HOSPADM

## 2022-07-06 RX ORDER — INSULIN LISPRO 100 [IU]/ML
20 INJECTION, SOLUTION INTRAVENOUS; SUBCUTANEOUS
Qty: 15 ML | Refills: 2 | Status: ON HOLD | OUTPATIENT
Start: 2022-07-06 | End: 2022-07-19 | Stop reason: SDUPTHER

## 2022-07-06 RX ORDER — SENNOSIDES 8.6 MG
1 TABLET ORAL DAILY
Status: DISCONTINUED | OUTPATIENT
Start: 2022-07-07 | End: 2022-07-19 | Stop reason: HOSPADM

## 2022-07-06 RX ORDER — NITROGLYCERIN 0.4 MG/1
0.4 TABLET SUBLINGUAL
Status: DISCONTINUED | OUTPATIENT
Start: 2022-07-06 | End: 2022-07-19 | Stop reason: HOSPADM

## 2022-07-06 RX ORDER — POTASSIUM CHLORIDE 20 MEQ/1
40 TABLET, EXTENDED RELEASE ORAL DAILY
Status: DISCONTINUED | OUTPATIENT
Start: 2022-07-07 | End: 2022-07-12

## 2022-07-06 RX ORDER — ONDANSETRON 2 MG/ML
4 INJECTION INTRAMUSCULAR; INTRAVENOUS EVERY 6 HOURS PRN
Status: DISCONTINUED | OUTPATIENT
Start: 2022-07-06 | End: 2022-07-19 | Stop reason: HOSPADM

## 2022-07-06 RX ORDER — GABAPENTIN 400 MG/1
400 CAPSULE ORAL 4 TIMES DAILY
Status: DISCONTINUED | OUTPATIENT
Start: 2022-07-06 | End: 2022-07-11

## 2022-07-06 RX ORDER — BUMETANIDE 0.25 MG/ML
0.5 INJECTION INTRAMUSCULAR; INTRAVENOUS CONTINUOUS
Status: DISCONTINUED | OUTPATIENT
Start: 2022-07-06 | End: 2022-07-10

## 2022-07-06 RX ORDER — INSULIN LISPRO 100 [IU]/ML
2-12 INJECTION, SOLUTION INTRAVENOUS; SUBCUTANEOUS
Status: DISCONTINUED | OUTPATIENT
Start: 2022-07-06 | End: 2022-07-19 | Stop reason: HOSPADM

## 2022-07-06 RX ORDER — HEPARIN SODIUM 5000 [USP'U]/ML
5000 INJECTION, SOLUTION INTRAVENOUS; SUBCUTANEOUS EVERY 8 HOURS SCHEDULED
Status: DISCONTINUED | OUTPATIENT
Start: 2022-07-06 | End: 2022-07-19 | Stop reason: HOSPADM

## 2022-07-06 RX ORDER — ACETAMINOPHEN 325 MG/1
650 TABLET ORAL EVERY 6 HOURS PRN
Status: DISCONTINUED | OUTPATIENT
Start: 2022-07-06 | End: 2022-07-19 | Stop reason: HOSPADM

## 2022-07-06 RX ADMIN — PANTOPRAZOLE SODIUM 40 MG: 40 TABLET, DELAYED RELEASE ORAL at 21:10

## 2022-07-06 RX ADMIN — DEXTROSE MONOHYDRATE 25 ML: 25 INJECTION, SOLUTION INTRAVENOUS at 17:09

## 2022-07-06 RX ADMIN — NYSTATIN: 100000 POWDER TOPICAL at 21:14

## 2022-07-06 RX ADMIN — HEPARIN SODIUM 5000 UNITS: 5000 INJECTION INTRAVENOUS; SUBCUTANEOUS at 21:12

## 2022-07-06 RX ADMIN — HYDROCODONE BITARTRATE AND ACETAMINOPHEN 1 TABLET: 5; 325 TABLET ORAL at 22:24

## 2022-07-06 RX ADMIN — DEXTROSE MONOHYDRATE 25 ML: 25 INJECTION, SOLUTION INTRAVENOUS at 20:13

## 2022-07-06 RX ADMIN — GABAPENTIN 400 MG: 400 CAPSULE ORAL at 21:10

## 2022-07-06 RX ADMIN — OLANZAPINE 5 MG: 5 TABLET, FILM COATED ORAL at 22:24

## 2022-07-06 RX ADMIN — Medication 1 MG/HR: at 17:59

## 2022-07-06 RX ADMIN — DEXTROSE MONOHYDRATE 25 ML: 25 INJECTION, SOLUTION INTRAVENOUS at 17:58

## 2022-07-06 NOTE — PROGRESS NOTES
Cardiology   Rachele Shi, MD Nelta Brewster, MD Mylene Nissen, DO, [facility], [facility]  MD Bart Jansen DO, King Stanford DO, [facility]  -------------------------------------------------------------------  [facility]  [address], [place] [address]  [phone]  [phone]  07/06/22  Ula Cogan  YOB: 1960   MRN: [id number]      Referring Physician: Sudhir Bond, [address] [place] [address]     HPI: Ula Cogan is a 63 y o  female with:     Coronary artery disease with multiple PCIs in the past, known chronic total occlusion of RCA, with chest pain on admission with new regional wall motion abnormalities and further reduction in LV systolic function now to EF 30% - status post cardiac catheterization on July 1, 2022 with PCI to the mid circumflex artery  Recent Acute renal failure on chronic kidney disease  Recent acute enteritis  Ischemic cardiomyopathy with moderate to markedly reduced LV systolic function  Dyslipidemia  Anemia of chronic kidney disease  Diabetes  PCOS  Fibromyalgia  Neurogenic bladder with suprapubic catheter in place  Obstructive sleep apnea, uncorrected  Mild pulmonary hypertension    She presents today for office follow-up  She states that she has been having much worsening shortness of breath, weight gain and lower extremity edema to the point where she has fluid seeping out of her skin  She has gained over 20 lb since her hospital discharge 4 days ago  She has significant shortness of breath with exertion and can only walk a few feet before she is so short of breath and she has to stop    She had recent hospital admission for acute renal failure on chronic kidney disease as well as acute heart failure with worsening CAD status post stent to the left circumflex artery  Review of Systems   Constitutional: Negative for chills and fever     HENT: Negative for facial swelling and sore throat  Eyes: Negative for visual disturbance  Respiratory: Positive for shortness of breath  Negative for cough, chest tightness and wheezing  Cardiovascular: Positive for leg swelling  Negative for chest pain and palpitations  Gastrointestinal: Negative for abdominal pain, blood in stool, constipation, diarrhea, nausea and vomiting  Endocrine: Negative for cold intolerance and heat intolerance  Genitourinary: Negative for decreased urine volume, difficulty urinating, dysuria and hematuria  Musculoskeletal: Negative for arthralgias, back pain and myalgias  Skin: Negative for rash  Neurological: Negative for dizziness, syncope, weakness and numbness  Psychiatric/Behavioral: Negative for agitation, behavioral problems and confusion  The patient is not nervous/anxious  OBJECTIVE  Vitals:    07/06/22 1544   BP: 120/60   Pulse: 67       Physical Exam  Constitutional: awake, alert and oriented, in no acute distress, no obvious deformities, obese female  Head: Normocephalic, without obvious abnormality, atraumatic  Eyes: conjunctivae clear and moist  Sclera anicteric  No xanthelasmas  Pupils equal bilaterally  Extraocular motions are full  Ear nose mouth and throat: ears are symmetrical bilaterally, hearing appears to be equal bilaterally, no nasal discharge or epistaxis, oropharynx is clear with moist mucous membranes  Neck:  Trachea is midline, neck is supple, no thyromegaly or significant lymphadenopathy, there is full range of motion    Lungs: clear to auscultation bilaterally, no wheezes, no rales, no rhonchi, no accessory muscle use, breathing is nonlabored  Heart: regular rate and rhythm, S1, S2 normal, no murmur, no click, no rub and no gallop, no lower extremity edema  Abdomen:  Obese, soft, non-tender; bowel sounds normal; no masses,  no organomegaly  Psychiatric:  Patient is oriented to time, place, person, mood/affect is negative for depression, anxiety, agitation, appears to have appropriate insight  Skin: Skin is warm, dry, intact  No obvious rashes or lesions on exposed extremities  Nail beds are pink with no cyanosis or clubbing  EKG:  Results for orders placed or performed in visit on 07/06/22   POCT ECG    Impression    Sinus rhythm with left bundle-branch block        IMPRESSION:  Acute exacerbation heart failure  Coronary artery disease with multiple PCIs in the past, known chronic total occlusion of RCA, with chest pain on admission with new regional wall motion abnormalities and further reduction in LV systolic function now to EF 30% - status post cardiac catheterization on July 1, 2022 with PCI to the mid circumflex artery  Acute renal failure on chronic kidney disease, improved/resolved  Recent acute enteritis  Ischemic cardiomyopathy with moderate to markedly reduced LV systolic function  Dyslipidemia  Anemia of chronic kidney disease  Diabetes  PCOS  Fibromyalgia  Neurogenic bladder with suprapubic catheter in place  Obstructive sleep apnea, uncorrected  Mild pulmonary hypertension      DISCUSSION/RECOMMENDATIONS:  She presents today for hospital follow-up   Unfortunately she is in acute heart failure  She has significant bilateral lower extremity pitting edema with fluid weeping from her legs  She is up greater than 20 lb since hospital discharge 4 days ago  She is on torsemide 40 mg daily   I recommended that she immediately present to the emergency department to be admitted to the hospital for acute exacerbation heart failure, re-evaluate renal function given her recent episode of acute renal failure as well  Would check troponin given recent PCI    She states that she has been having significant dyspnea with exertion and some chest pressure with exertion, had recent intervention to the left circumflex artery   Continue with dual antiplatelet therapy with aspirin Plavix   Lipitor 80   Bisoprolol 2 5   Imdur 60  As long as renal function is stable likely would diurese with a Bumex drip to start with 1 milligram/hour  Will need daily standing weights, follow renal function, electrolytes, in's and out's    Kleber Hinkle DO, [facility], [facility]  --------------------------------------------------------------------------------  TREADMILL STRESS  No results found for this or any previous visit      ----------------------------------------------------------------------------------------------  NUCLEAR STRESS TEST: No results found for this or any previous visit  Results for orders placed during the hospital encounter of 06/21/22    NM myocardial perfusion spect (rx stress and/or rest)    Interpretation Summary  1  Nondiagnostic ECG portion of stress test due to presence of left bundle-branch block abnormality  No angina and no new ST T-wave abnormalities were noted during the stress test   2  Abnormal myocardial perfusion scan  There were multiple perfusion abnormalities involving the inferolateral and lateral wall and anterior and anteroseptal walls  Cannot exclude ischemia involving multiple vessels (triple-vessel and left main disease)  Imaging portion of the stress test is affected by significant soft tissue and diaphragmatic attenuation artifact  3  Dilated left ventricular cavity with moderate to markedly reduced left ventricular systolic function and regional wall motion abnormalities  Post-stress ejection fraction was calculated as 32% although visually it was around 40%  4  Elevated resting blood pressure with appropriate blood pressure response noted during stress test   5  Abnormal baseline ECG with left bundle-branch block  No significant changes and no significant arrhythmia noted during stress test     There is no prior study for comparison  Clinical correlation is recommended        --------------------------------------------------------------------------------  CATH:  Results for orders placed during the hospital encounter of 21    Cardiac catheterization    Narrative  [facility]  [place], [address]  [phone]    [facility]    Invasive Cardiovascular Lab Complete Report    Patient: Paulino Ojeda  MR number: [id number]  Account number: [de-identified]  Study date: 2021  Gender: Female  : 1960  Height: 68 1 in  Weight: 279 4 lb  BSA: 2 36 mï¾²    Allergies: CODEINE, LATEX    Diagnostic Cardiologist:  Ciera Chong MD  Primary Physician:  Janis Human    SUMMARY    CORONARY CIRCULATION:  Proximal RCA: There was a 100 % stenosis  This lesion is a chronic total occlusion  INDICATIONS:  --  Possible CAD: myocardial infarction without ST elevation (NSTEMI)  --  Congestive heart failure with ischemic cardiomyopathy  PROCEDURES PERFORMED    --  Left heart catheterization without ventriculogram   --  Left coronary angiography  --  Right coronary angiography  --  Inpatient  --  Mod Sedation Same Physician Initial 15min  --  Coronary Catheterization (w/ LHC)  PROCEDURE: The risks and alternatives of the procedures and conscious sedation were explained to the patient and informed consent was obtained  The patient was brought to the cath lab and placed on the table  The planned puncture sites  were prepped and draped in the usual sterile fashion  --  Right radial artery access  After performing an Erasto's test to verify adequate ulnar artery supply to the hand, the radial site was prepped  The puncture site was infiltrated with local anesthetic  The vessel was accessed using the  modified Seldinger technique, a wire was advanced into the vessel, and a sheath was advanced over the wire into the vessel  --  Left heart catheterization without ventriculogram  A catheter was advanced over a guidewire into the ascending aorta  After recording ascending aortic pressure, the catheter was advanced across the aortic valve and left ventricular  pressure was recorded  The catheter was pulled back across the aortic valve and into the ascending aorta and pullback pressures were obtained  --  Left coronary artery angiography  A catheter was advanced over a guidewire into the aorta and positioned in the left coronary artery ostium under fluoroscopic guidance  Angiography was performed  --  Right coronary artery angiography  A catheter was advanced over a guidewire into the aorta and positioned in the right coronary artery ostium under fluoroscopic guidance  Angiography was performed  --  Inpatient  --  Mod Sedation Same Physician Initial 15min  --  Coronary Catheterization (w/ [facility])  PROCEDURE COMPLETION: The patient tolerated the procedure well and was discharged from the cath lab  TIMING: Test started at 14:33  Test concluded at 14:45  HEMOSTASIS: The sheath was removed  The site was compressed with a Hemoband  device  Hemostasis was obtained  MEDICATIONS GIVEN: Fentanyl (1OOmcg/2 ml), 50 mcg, IV, at 14:30  Versed (2mg/2ml), 2 mg, IV, at 14:30  Zofran (Ondansetron), 4 mg, IV, at 14:34  1% Lidocaine, 2 ml, subcutaneously, at 14:35  Nitroglycerin  (200mcg/ml), 200 mcg, at 14:37  Verapamil (5mg/2ml), 5 mg, IV, at 14:37  Heparin 1000 units/ml, 4,000 units, IV, at 14:37  CONTRAST GIVEN: 30 ml Visipaque (320mg I /ml)  RADIATION EXPOSURE: Fluoroscopy time: 1 32 min  HEMODYNAMICS: Hemodynamic assessment demonstrated normal LVEDP  12-14    CORONARY VESSELS:   --  The coronary circulation is left dominant  --  Left main: The vessel was medium to large sized  Angiography showed mild atherosclerosis  --  LAD: The vessel was large sized  Angiography showed no evidence of disease  --  Proximal LAD: There was a discrete 30 % stenosis at the site of a prior stent  --  Mid LAD: There was a 0 % stenosis at the site of a prior stent  --  Distal LAD: There was a 0 % stenosis at the site of a prior stent  --  Proximal circumflex:  There was a 0 % stenosis at the site of a prior stent   --  RCA: The vessel was small (non-dominant)  --  Proximal RCA: There was a 100 % stenosis  This lesion is a chronic total occlusion  IMPRESSIONS:  Type 2 MI  There is significant single vessel coronary artery disease  RECOMMENDATIONS  The patient should continue with the present medications  DISPOSITION:  The patient left the catheterization laboratory in stable condition  Prepared and signed by    Ronaldo Jameson MD  Signed 2021 14:55:54    Study diagram    Angiographic findings  Native coronary lesions:  ï¾·Proximal LAD: Lesion 1: discrete, 30 % stenosis, site of prior stent  ï¾·Mid LAD: Lesion 1: 0 % stenosis, site of prior stent  ï¾·Distal LAD: Lesion 1: 0 % stenosis, site of prior stent  ï¾·Proximal circumflex: Lesion 1: 0 % stenosis, site of prior stent  ï¾·Proximal RCA: Lesion 1: 100 % stenosis      Hemodynamic tables    Pressures:  Baseline  Pressures:  - HR: 66  Pressures:  - Rhythm:  Pressures:  -- Aortic Pressure (S/D/M): 153/61/76  Pressures:  -- Left Ventricle (s/edp): 139/12/--  Pressures:  -- Left Ventricle (s/edp): 139/19/--    Outputs:  Baseline  Outputs:  -- CALCULATIONS: [age]  Outputs:  -- CALCULATIONS: Body Surface Area: 2 36  Outputs:  -- CALCULATIONS: Height in cm: 173 00  Outputs:  -- CALCULATIONS: Sex: Female  Outputs:  -- CALCULATIONS: Weight in k 00    --------------------------------------------------------------------------------  ECHO:   Results for orders placed during the hospital encounter of 21    Echo complete with contrast if indicated    Narrative  [facility] [address]    Transthoracic Echocardiogram  2D, M-mode, Doppler, and Color Doppler    Study date:  24-Aug-2021    Patient: Malaika Oconnell  MR number: [id number]  Account number: [de-identified]  : 1960  Age: 64 years  Gender: Female  Status: Inpatient  Location: [place]   Height: 68 in  Weight: 312 4 lb  BP: 149/ 79 mmHg    Indications: Heart failure    Diagnoses: I50 9 - Heart failure, unspecified    Sonographer:  CASANDRA More  Interpreting Physician:  NICOLE Gan  Primary Physician:  Bong Ley MD  Referring Physician:  CHERELLE Peñaloza  Group:  [facility]    SUMMARY    LEFT VENTRICLE:  Systolic function was mildly reduced by visual assessment  Ejection fraction was estimated to be 45 %  There were regional wall motion abnormalities that suggest coronary artery disease  There was severe hypokinesis of the basal-mid anteroseptal and basal-mid inferoseptal wall(s)  There was moderate hypokinesis of the basal-mid inferior wall(s)  Wall thickness was mildly increased  There was mild concentric hypertrophy  Features were consistent with grade 2 diastolic dysfunction  Doppler parameters were consistent with high ventricular filling pressure  E/E' was > 19    VENTRICULAR SEPTUM:  There was dyssynergic motion  These changes are consistent with LBBB  MITRAL VALVE:  There was mild "progressive" mitral stenosis  Mean gradient of 5 mmHg at 73 bpm  MVA by Doppler continuity equation is 2 15 cm2  TRICUSPID VALVE:  There was mild regurgitation  PULMONIC VALVE:  There was trace regurgitation  PERICARDIUM:  A trace pericardial effusion was identified  HISTORY: PRIOR HISTORY: CAD, CKD Risk factors: hypertension and diabetes  PROCEDURE: The study was performed in the [place] [address]  This was a routine study  The transthoracic approach was used  The study included complete 2D imaging, M-mode, complete spectral Doppler, and color Doppler  The heart rate was 76  bpm, at the start of the study  Images were obtained from the parasternal, apical, subcostal, and suprasternal notch acoustic windows  Image quality was adequate  LEFT VENTRICLE: Size was normal  Systolic function was mildly reduced by visual assessment   Ejection fraction was estimated to be 45 %  There were regional wall motion abnormalities that suggest coronary artery disease  There was severe hypokinesis of the basal-mid anteroseptal and basal-mid inferoseptal wall(s)  There was moderate hypokinesis of the basal-mid inferior wall(s)  Wall thickness was mildly increased  There was mild concentric hypertrophy  DOPPLER: Features were consistent with grade 2 diastolic dysfunction  Doppler parameters were consistent with high ventricular filling pressure  E/E' was > 19    VENTRICULAR SEPTUM: There was dyssynergic motion  These changes are consistent with LBBB  RIGHT VENTRICLE: The size was normal  Systolic function was normal  Wall thickness was normal     LEFT ATRIUM: Size was within the limits of normal when indexed for body surface area  LA volume index 31 ml/m2  RIGHT ATRIUM: Size was normal     MITRAL VALVE: Valve structure was normal  There was mild thickening  There was normal leaflet separation  There was mildly restricted mobility of the anterior leaflet  DOPPLER: The transmitral velocity was within the normal range  There  was mild "progressive" mitral stenosis  Mean gradient of 5 mmHg at 73 bpm  MVA by Doppler continuity equation is 2 15 cm2  There was no regurgitation  AORTIC VALVE: The valve was trileaflet  Leaflets exhibited normal thickness and normal cuspal separation  DOPPLER: Transaortic velocity was within the normal range  There was no evidence for stenosis  There was no regurgitation  TRICUSPID VALVE: The valve structure was normal  There was normal leaflet separation  DOPPLER: The transtricuspid velocity was within the normal range  There was no evidence for stenosis  There was mild regurgitation  Estimated peak PA  pressure was 34 mmHg  PULMONIC VALVE: Not well visualized  DOPPLER: There was no evidence for stenosis  There was trace regurgitation  PERICARDIUM: A trace pericardial effusion was identified  The pericardium was normal in appearance      AORTA: The root exhibited normal size  SYSTEMIC VEINS: IVC: The inferior vena cava was normal in size and course  Respirophasic changes were normal     SYSTEM MEASUREMENT TABLES    2D  %FS: 25 15 %  Ao Diam: 2 77 cm  EDV(Teich): 136 76 ml  EF(Teich): 49 26 %  ESV(Teich): 69 39 ml  IVSd: 1 07 cm  LA Area: 30 26 cm2  LA Diam: 4 91 cm  LVEDV MOD A4C: 210 21 ml  LVEF MOD A4C: 46 53 %  LVESV MOD A4C: 112 41 ml  LVIDd: 5 32 cm  LVIDs: 3 99 cm  LVLd A4C: 9 67 cm  LVLs A4C: 8 21 cm  LVPWd: 1 1 cm  RA Area: 18 38 cm2  RVIDd: 3 28 cm  SV MOD A4C: 97 8 ml  SV(Teich): 67 38 ml    CW  TR Vmax: 2 78 m/s  TR maxP 99 mmHg    MM  TAPSE: 2 42 cm    PW  E' Sept: 0 06 m/s  E/E' Sept: 19 84  MV A Santosh: 1 49 m/s  MV Dec Bailey: 9 08 m/s2  MV DecT: 137 82 ms  MV E Santosh: 1 25 m/s  MV E/A Ratio: 0 84  MV PHT: 39 97 ms  MVA By PHT: 5 5 cm2    [facility]    Prepared and electronically signed by    NICOLE Ruelas  Signed 24-Aug-2021 14:54:43    No results found for this or any previous visit     --------------------------------------------------------------------------------  HOLTER  No results found for this or any previous visit      No results found for this or any previous visit     --------------------------------------------------------------------------------  CAROTIDS  No results found for this or any previous visit      --------------------------------------------------------------------------------  Diagnoses and all orders for this visit:    NSTEMI (non-ST elevated myocardial infarction) ([facility] )  -     POCT ECG    Chronic combined systolic and diastolic CHF (congestive heart failure) ([place] [address] )    Hypertension, unspecified type    Coronary artery disease involving native coronary artery of native heart without angina pectoris    Diabetic ulcer of toe of right foot associated with type 2 diabetes mellitus, with muscle involvement without evidence of necrosis (HCC)    Stage 4 chronic kidney disease (HCC)    Urinary tract infection associated with cystostomy catheter, subsequent encounter    Anxiety    Acute on chronic systolic heart failure ([facility] )  -     Transfer to other facility     ======================================================    Past Medical History:   Diagnosis Date    Abnormal liver function     Anemia     Anxiety     Arthritis     Chronic kidney disease     stage 3    Chronic narcotic dependence (HCC)     Chronic pain disorder     lower back, hands , neck and knees    Coronary artery disease     Depression     Diabetes mellitus ([facility] )     Elevated troponin 2/11/2022    GERD (gastroesophageal reflux disease)     no meds at present    Heart murmur     murmur    Hyperlipidemia     Hypertension     Neurogenic bladder     Open toe wound 12/2020    right big toe open calus but is dry at present    Renal disorder     Shortness of breath     exertion    Sleep apnea     doesn't use cpap    Suprapubic catheter [facility])      Past Surgical History:   Procedure Laterality Date    AMPUTATION      ANGIOPLASTY  2017    5    BREAST EXCISIONAL BIOPSY Left     BREAST SURGERY      CARDIAC CATHETERIZATION      CARDIAC CATHETERIZATION N/A 7/1/2022    Procedure: Cardiac catheterization;  Surgeon: Catherine Cantu MD;  Location: [facility] CARDIAC [facility]ATH LAB; Service: Cardiology    CARDIAC CATHETERIZATION N/A 7/1/2022    Procedure: Cardiac pci;  Surgeon: Catherine Cantu MD;  Location: [facility];   Service: Cardiology    CARPAL TUNNEL RELEASE Bilateral     CERVICAL FUSION      HYSTERECTOMY  2008    IR SUPRAPUBIC CATHETER PLACEMENT  6/15/2021    KNEE SURGERY      OOPHORECTOMY  2008    NV EXC SKIN BENIG 3 1-4 CM REMAINDR BODY N/A 12/21/2020    Procedure: EXCISION SEBACEOUS CYST X 5 SCALP;  Surgeon: Hitesh Laird MD;  Location: [address] [place] [address];  Service: General    TOE AMPUTATION Left     TRIGGER FINGER RELEASE Right     4th Finger         Medications  Current Outpatient Medications Medication Sig Dispense Refill    allopurinol (ZYLOPRIM) 300 mg tablet Take 1 tablet (300 mg total) by mouth daily 90 tablet 1    amLODIPine (NORVASC) 10 mg tablet Take 1 tablet (10 mg total) by mouth daily 90 tablet 0    aspirin (ECOTRIN LOW STRENGTH) 81 mg EC tablet Take 1 tablet (81 mg total) by mouth daily 90 tablet 1    atorvastatin (LIPITOR) 40 mg tablet Take 1 tablet (40 mg total) by mouth daily 90 tablet 1    bisoprolol (ZEBETA) 5 mg tablet Take 0 5 tablets (2 5 mg total) by mouth daily 90 tablet 0    citalopram (CeleXA) 40 mg tablet Take 1 tablet (40 mg total) by mouth daily 90 tablet 1    clopidogrel (PLAVIX) 75 mg tablet Take 1 tablet (75 mg total) by mouth daily 90 tablet 1    Dulaglutide (Trulicity) 1 5 XS/7 8FL SOPN Inject 0 5 mL (1 5 mg total) under the skin once a week 6 mL 1    ferrous sulfate 325 (65 Fe) mg tablet Take 1 tablet (325 mg total) by mouth every other day 45 tablet 1    gabapentin (NEURONTIN) 800 mg tablet Take 1 tablet (800 mg total) by mouth 4 (four) times a day 120 tablet 2    HYDROcodone-acetaminophen (NORCO) 5-325 mg per tablet Take 1 tablet by mouth 3 (three) times a day as needed for pain For ongoing pain Max Daily Amount: 3 tablets 90 tablet 0    insulin glargine (Lantus SoloStar) 100 units/mL injection pen Inject 50 Units under the skin every 12 (twelve) hours 30 mL 3    insulin lispro (HumaLOG) 100 units/mL injection pen INJECT 20 UNITS UNDER THE SKIN 3 (THREE) TIMES A DAY WITH MEALS 15 mL 2    Insulin Pen Needle (BD Pen Needle Micro U/F) 32G X 6 MM MISC Use 5 (five) times a day 200 each 5    Insulin Syringe-Needle U-100 (BD Veo Insulin Syringe U/F) 31G X 15/64" 0 5 ML MISC Inject under the skin 3 (three) times a day 100 each 2    isosorbide mononitrate (IMDUR) 60 mg 24 hr tablet TAKE 1 TABLET BY MOUTH EVERY DAY 90 tablet 2    Lancets (OneTouch Delica Plus [id number]) MISC USE TO TEST 3 TIMES DAILY 100 each 2    levothyroxine 50 mcg tablet TAKE 1 TABLET BY MOUTH EVERY DAY 60 tablet 0    loperamide (IMODIUM) 2 mg capsule Take 1 capsule (2 mg total) by mouth 4 (four) times a day as needed for diarrhea 12 capsule 0    naloxone (NARCAN) 4 mg/0 1 mL nasal spray Administer 1 spray into a nostril  If breathing does not return to normal or if breathing difficulty resumes after 2-3 minutes, give another dose in the other nostril using a new spray  1 each 1    nitroglycerin (NITROSTAT) 0 4 mg SL tablet Place 1 tablet (0 4 mg total) under the tongue every 5 (five) minutes as needed for chest pain 25 tablet 1    nystatin (MYCOSTATIN) powder Apply topically 2 (two) times a day 30 g 1    OLANZapine (ZyPREXA) 5 mg tablet Take 1 tablet (5 mg total) by mouth daily at bedtime 90 tablet 0    ondansetron (ZOFRAN-ODT) 4 mg disintegrating tablet Take 1 tablet (4 mg total) by mouth every 8 (eight) hours as needed for nausea or vomiting 20 tablet 0    OneTouch Ultra test strip USE 1 EACH DAILY USE AS INSTRUCTED 100 strip 2    pantoprazole (PROTONIX) 40 mg tablet TAKE 1 TABLET BY MOUTH TWICE A  tablet 1    tiZANidine (ZANAFLEX) 4 mg tablet TAKE 1 TABLET (4 MG TOTAL) BY MOUTH EVERY 8 (EIGHT) HOURS AS NEEDED FOR MUSCLE SPASMS 270 tablet 1    torsemide (DEMADEX) 20 mg tablet Take 2 tablets (40 mg total) by mouth daily 180 tablet 0     No current facility-administered medications for this visit          Allergies   Allergen Reactions    Codeine      Other reaction(s): Nausea and Vomiting    Latex Itching       Social History     Socioeconomic History    Marital status:      Spouse name: Not on file    Number of children: Not on file    Years of education: Not on file    Highest education level: Not on file   Occupational History    Not on file   Tobacco Use    Smoking status: Former Smoker     Packs/day: 1 00     Years: 35 00     Pack years: 35 00     Types: Cigarettes     Quit date: 2012     Years since quitting: 10 5    Smokeless tobacco: Never Used   Vaping Use  Vaping Use: Never used   Substance and Sexual Activity    Alcohol use: Not Currently    Drug use: Never    Sexual activity: Not Currently   Other Topics Concern    Not on file   Social History Narrative    Not on file     Social Determinants of Health     Financial Resource Strain: Low Risk     Difficulty of Paying Living Expenses: Not hard at all   Food Insecurity: No Food Insecurity    Worried About Running Out of Food in the Last Year: Never true    Henry of Food in the Last Year: Never true   Transportation Needs: No Transportation Needs    Lack of Transportation (Medical): No    Lack of Transportation (Non-Medical):  No   Physical Activity: Not on file   Stress: Not on file   Social Connections: Not on file   Intimate Partner Violence: Not on file   Housing Stability: Low Risk     Unable to Pay for Housing in the Last Year: No    Number of Places Lived in the Last Year: 1    Unstable Housing in the Last Year: No        Family History   Problem Relation Age of Onset    Stroke Father     Heart disease Father     No Known Problems Mother     No Known Problems Sister     No Known Problems Daughter     No Known Problems Maternal Grandmother     No Known Problems Maternal Grandfather     No Known Problems Paternal Grandmother     No Known Problems Paternal Grandfather     No Known Problems Maternal Aunt     No Known Problems Maternal Aunt     No Known Problems Maternal Aunt     No Known Problems Maternal Aunt     No Known Problems Maternal Aunt     No Known Problems Maternal Aunt     No Known Problems Paternal Aunt        Lab Results   Component Value Date    WBC 10 06 06/30/2022    HGB 8 1 (L) 06/30/2022    HCT 26 3 (L) 06/30/2022     (H) 06/30/2022     06/30/2022      Lab Results   Component Value Date    SODIUM 142 07/02/2022    K 4 0 07/02/2022     07/02/2022    CO2 25 07/02/2022    BUN 48 (H) 07/02/2022    CREATININE 2 16 (H) 07/02/2022    GLUC 116 07/02/2022 CALCIUM 7 9 (L) 07/02/2022      Lab Results   Component Value Date    HGBA1C 7 3 (A) 03/29/2022      No results found for: CHOL  Lab Results   Component Value Date    HDL 42 (L) 06/24/2022    HDL 30 (L) 08/03/2021    HDL 45 12/18/2020     Lab Results   Component Value Date    LDLCALC 50 06/24/2022    LDLCALC 86 08/03/2021    LDLCALC 122 12/18/2020     Lab Results   Component Value Date    TRIG 138 06/24/2022    TRIG 180 (H) 08/03/2021    TRIG 162 (H) 12/18/2020     No results found for: [place], [place]   Lab Results   Component Value Date    INR 1 21 (H) 06/22/2022    INR 1 03 08/23/2021    INR 0 93 05/26/2021    PROTIME 14 9 (H) 06/22/2022    PROTIME 13 6 08/23/2021    PROTIME 12 3 05/26/2021          Patient Active Problem List    Diagnosis Date Noted    Chronic combined systolic and diastolic CHF (congestive heart failure) [facility] ) 08/24/2021    Prolonged QT interval 08/24/2021    Urinary tract infection associated with cystostomy catheter ([facility] ) 08/23/2021    Neurogenic bladder 08/23/2021    Morbid obesity with BMI of 45 0-49 9, adult ([facility] ) 06/15/2021    History of heart artery stent 06/15/2021    Memory impairment 05/26/2021    Chronic bronchitis ([facility] ) 05/25/2021    Severe episode of recurrent major depressive disorder, without psychotic features ([facility] ) 05/25/2021    Skin ulcer of hand, limited to breakdown of skin ([facility] ) 02/25/2021    HTN (hypertension) 12/21/2020    Diabetic ulcer of toe of right foot associated with type 2 diabetes mellitus, with muscle involvement without evidence of necrosis ([facility] ) 11/25/2020    Head lump 11/11/2020    Anemia 09/09/2020    Abnormal LFTs 09/09/2020    S/P cervical spinal fusion 09/09/2020    Major depressive disorder 09/02/2020    Type 2 diabetes mellitus with stage 4 chronic kidney disease, with long-term current use of insulin ([facility] ) 09/02/2020    Anxiety 09/02/2020    CAD (coronary artery disease) 09/02/2020    Osteoarthritis of left knee 09/02/2020    Cellulitis of finger of right hand 08/26/2020    Hyponatremia 06/23/2022    History of anemia due to chronic kidney disease 06/23/2022    Bradycardia 06/23/2022    Syncope 06/22/2022    Acute renal failure superimposed on chronic kidney disease ([address] ) 06/22/2022    Chest pain 06/22/2022    Elevated troponin I level 06/22/2022    Hypotension 06/22/2022    ALFRED (acute kidney injury) ([address] ) 03/03/2022    Encounter for examination following treatment at hospital 02/22/2022    Other artificial openings of urinary tract status ([address] ) 02/10/2022    Continuous opioid dependence ([address] ) 02/10/2022    Adrenal nodule ([address] ) 02/10/2022    Stable angina ([address] ) 02/10/2022    Volume overload 02/10/2022    Acquired hypothyroidism 10/14/2021    Mixed hyperlipidemia 10/14/2021    Gastroesophageal reflux disease without esophagitis 10/13/2021    Other proteinuria 09/14/2021    Stage 4 chronic kidney disease ([address] ) 06/04/2021       Portions of the record may have been created with voice recognition software  Occasional wrong word or "sound a like" substitutions may have occurred due to the inherent limitations of voice recognition software  Read the chart carefully and recognize, using context, where substitutions have occurred      Kleber Hinkle DO, [facility]  7/6/2022 4:17 PM

## 2022-07-06 NOTE — ED PROVIDER NOTES
nHistory  Chief Complaint   Patient presents with    Shortness of Breath     Patient sent over from cardiologist, reports 25lb weight gain in four days  History of CHF  Reports SOB at rest, swelling increased in legs  Patient takes torsemide daily  Patient reports mild chest pain and sob at rest       Patient is a 53-year-old female with a history of coronary artery disease status post PCI eyes as well as known chronic total occlusion of right Ca with most recent catheterization on July 1st with PCI to the mid circumflex artery as well as a history of chronic kidney disease, ischemic cardiomyopathy with moderate to severely reduced LV function, pulmonary hypertension, obstructive sleep apnea, PCOS, dyslipidemia, diabetes  She states that over the past 4 days she is having approximately 20 lb weight gain over the past several days  She with her cardiologist this morning who sent her in for further eval   She states when she tries to walk even short distances she becomes short of breath with some chest discomfort  At rest she has no chest pain or shortness of breath  She has been compliant with her medications and states that is no significant change  She has no fevers, palpitations or syncope  I did review chart as patient was seen by her cardiologist today  She is currently on torsemide 40 mg daily  Wishes for continued aspirin as well as Plavix and Lipitor    He did also write has no daily weights, renal function electrolytes, as well as diuresed with Bumex drip at 1 milligram/hour        History provided by:  Patient  Shortness of Breath  Severity:  Moderate  Onset quality:  Gradual  Duration:  4 days  Timing:  Constant  Progression:  Worsening  Chronicity:  Recurrent  Context: not activity, not animal exposure, not emotional upset, not fumes, not known allergens, not occupational exposure, not pollens, not smoke exposure, not strong odors, not URI and not weather changes    Relieved by: Nothing  Worsened by:  Nothing  Ineffective treatments:  None tried  Associated symptoms: chest pain    Associated symptoms: no abdominal pain, no claudication, no cough, no diaphoresis, no ear pain, no fever, no headaches, no hemoptysis, no neck pain, no PND, no rash, no sore throat, no syncope, no swollen glands, no vomiting and no wheezing    Risk factors: obesity    Risk factors: no recent alcohol use, no family hx of DVT and no hx of cancer        Prior to Admission Medications   Prescriptions Last Dose Informant Patient Reported? Taking?    Dulaglutide (Trulicity) 1 5 JX/4 9EW SOPN   No Yes   Sig: Inject 0 5 mL (1 5 mg total) under the skin once a week   HYDROcodone-acetaminophen (NORCO) 5-325 mg per tablet   No Yes   Sig: Take 1 tablet by mouth 3 (three) times a day as needed for pain For ongoing pain Max Daily Amount: 3 tablets   Insulin Pen Needle (BD Pen Needle Micro U/F) 32G X 6 MM MISC   No Yes   Sig: Use 5 (five) times a day   Insulin Syringe-Needle U-100 (BD Veo Insulin Syringe U/F) 31G X 15/64" 0 5 ML MISC   No Yes   Sig: Inject under the skin 3 (three) times a day   Lancets (OneTouch Delica Plus [id number]) MISC  Self No Yes   Sig: USE TO TEST 3 TIMES DAILY   OLANZapine (ZyPREXA) 5 mg tablet   No Yes   Sig: Take 1 tablet (5 mg total) by mouth daily at bedtime   OneTouch Ultra test strip  Self No Yes   Sig: USE 1 EACH DAILY USE AS INSTRUCTED   allopurinol (ZYLOPRIM) 300 mg tablet   No Yes   Sig: Take 1 tablet (300 mg total) by mouth daily   amLODIPine (NORVASC) 10 mg tablet   No Yes   Sig: Take 1 tablet (10 mg total) by mouth daily   aspirin (ECOTRIN LOW STRENGTH) 81 mg EC tablet   No Yes   Sig: Take 1 tablet (81 mg total) by mouth daily   atorvastatin (LIPITOR) 40 mg tablet   No Yes   Sig: Take 1 tablet (40 mg total) by mouth daily   bisoprolol (ZEBETA) 5 mg tablet   No Yes   Sig: Take 0 5 tablets (2 5 mg total) by mouth daily   citalopram (CeleXA) 40 mg tablet   No Yes   Sig: Take 1 tablet (40 mg total) by mouth daily   clopidogrel (PLAVIX) 75 mg tablet   No Yes   Sig: Take 1 tablet (75 mg total) by mouth daily   ferrous sulfate 325 (65 Fe) mg tablet   No Yes   Sig: Take 1 tablet (325 mg total) by mouth every other day   gabapentin (NEURONTIN) 800 mg tablet   No Yes   Sig: Take 1 tablet (800 mg total) by mouth 4 (four) times a day   insulin glargine (Lantus SoloStar) 100 units/mL injection pen   No Yes   Sig: Inject 50 Units under the skin every 12 (twelve) hours   insulin lispro (HumaLOG) 100 units/mL injection pen   No Yes   Sig: INJECT 20 UNITS UNDER THE SKIN 3 (THREE) TIMES A DAY WITH MEALS   isosorbide mononitrate (IMDUR) 60 mg 24 hr tablet   No Yes   Sig: TAKE 1 TABLET BY MOUTH EVERY DAY   levothyroxine 50 mcg tablet  Self No Yes   Sig: TAKE 1 TABLET BY MOUTH EVERY DAY   loperamide (IMODIUM) 2 mg capsule   No Yes   Sig: Take 1 capsule (2 mg total) by mouth 4 (four) times a day as needed for diarrhea   naloxone (NARCAN) 4 mg/0 1 mL nasal spray  Self No Yes   Sig: Administer 1 spray into a nostril  If breathing does not return to normal or if breathing difficulty resumes after 2-3 minutes, give another dose in the other nostril using a new spray     nitroglycerin (NITROSTAT) 0 4 mg SL tablet  Self No Yes   Sig: Place 1 tablet (0 4 mg total) under the tongue every 5 (five) minutes as needed for chest pain   nystatin (MYCOSTATIN) powder  Self No Yes   Sig: Apply topically 2 (two) times a day   ondansetron (ZOFRAN-ODT) 4 mg disintegrating tablet   No Yes   Sig: Take 1 tablet (4 mg total) by mouth every 8 (eight) hours as needed for nausea or vomiting   pantoprazole (PROTONIX) 40 mg tablet  Self No Yes   Sig: TAKE 1 TABLET BY MOUTH TWICE A DAY   tiZANidine (ZANAFLEX) 4 mg tablet   No Yes   Sig: TAKE 1 TABLET (4 MG TOTAL) BY MOUTH EVERY 8 (EIGHT) HOURS AS NEEDED FOR MUSCLE SPASMS   torsemide (DEMADEX) 20 mg tablet   No Yes   Sig: Take 2 tablets (40 mg total) by mouth daily      Facility-Administered Medications: None       Past Medical History:   Diagnosis Date    Abnormal liver function     Anemia     Anxiety     Arthritis     Chronic kidney disease     stage 3    Chronic narcotic dependence (HCC)     Chronic pain disorder     lower back, hands , neck and knees    Coronary artery disease     Depression     Diabetes mellitus ([facility] [address] )     Elevated troponin 2/11/2022    GERD (gastroesophageal reflux disease)     no meds at present    Heart murmur     murmur    Hyperlipidemia     Hypertension     Neurogenic bladder     Open toe wound 12/2020    right big toe open calus but is dry at present    Renal disorder     Shortness of breath     exertion    Sleep apnea     doesn't use cpap    Suprapubic catheter [facility])        Past Surgical History:   Procedure Laterality Date    AMPUTATION      ANGIOPLASTY  2017 5    BREAST EXCISIONAL BIOPSY Left     BREAST SURGERY      CARDIAC CATHETERIZATION      CARDIAC CATHETERIZATION N/A 7/1/2022    Procedure: Cardiac catheterization;  Surgeon: Nicole Gomez MD;  Location: [facility]; Service: Cardiology    CARDIAC CATHETERIZATION N/A 7/1/2022    Procedure: Cardiac pci;  Surgeon: Nicole Gomez MD;  Location: [facility];   Service: Cardiology    CARPAL TUNNEL RELEASE Bilateral     CERVICAL FUSION      HYSTERECTOMY  2008    IR SUPRAPUBIC CATHETER PLACEMENT  6/15/2021    KNEE SURGERY      OOPHORECTOMY  2008    NM EXC SKIN BENIG 3 1-4 CM REMAINDR BODY N/A 12/21/2020    Procedure: EXCISION SEBACEOUS CYST X 5 SCALP;  Surgeon: Jace King MD;  Location:  MAIN OR;  Service: General    TOE AMPUTATION Left     TRIGGER FINGER RELEASE Right     4th Finger       Family History   Problem Relation Age of Onset    Stroke Father     Heart disease Father     No Known Problems Mother     No Known Problems Sister     No Known Problems Daughter     No Known Problems Maternal Grandmother     No Known Problems Maternal Grandfather     No Known Problems Paternal Grandmother     No Known Problems Paternal Grandfather     No Known Problems Maternal Aunt     No Known Problems Maternal Aunt     No Known Problems Maternal Aunt     No Known Problems Maternal Aunt     No Known Problems Maternal Aunt     No Known Problems Maternal Aunt     No Known Problems Paternal Aunt      I have reviewed and agree with the history as documented  E-Cigarette/Vaping    E-Cigarette Use Never User      E-Cigarette/Vaping Substances    Nicotine No     THC No     CBD No     Flavoring No     Other No     Unknown No      Social History     Tobacco Use    Smoking status: Former Smoker     Packs/day: 1 00     Years: 35 00     Pack years: 35 00     Types: Cigarettes     Quit date: 2012     Years since quitting: 10 5    Smokeless tobacco: Never Used   Vaping Use    Vaping Use: Never used   Substance Use Topics    Alcohol use: Not Currently    Drug use: Never       Review of Systems   Constitutional: Negative  Negative for chills, diaphoresis and fever  HENT: Negative  Negative for ear pain and sore throat  Eyes: Negative for pain and visual disturbance  Respiratory: Positive for shortness of breath  Negative for cough, hemoptysis and wheezing  Cardiovascular: Positive for chest pain  Negative for palpitations, claudication, syncope and PND  Gastrointestinal: Negative  Negative for abdominal pain and vomiting  Genitourinary: Negative  Negative for dysuria and hematuria  Musculoskeletal: Negative  Negative for arthralgias, back pain and neck pain  Skin: Negative  Negative for color change and rash  Neurological: Negative  Negative for seizures, syncope and headaches  Hematological: Negative  Psychiatric/Behavioral: Negative  All other systems reviewed and are negative  Physical Exam  Physical Exam  Vitals and nursing note reviewed  Constitutional:       General: She is not in acute distress       Appearance: She is well-developed  HENT:      Head: Normocephalic and atraumatic  Comments: Patient maintaining airway and secretions  No stridor   No brawniness under tongue  Eyes:      Extraocular Movements: Extraocular movements intact  Conjunctiva/sclera: Conjunctivae normal       Pupils: Pupils are equal, round, and reactive to light  Cardiovascular:      Rate and Rhythm: Normal rate and regular rhythm  Pulses:           Radial pulses are 2+ on the right side and 2+ on the left side  Heart sounds: S1 normal and S2 normal  No murmur heard  Pulmonary:      Effort: Pulmonary effort is normal  No respiratory distress  Breath sounds: Examination of the right-middle field reveals decreased breath sounds  Examination of the left-middle field reveals decreased breath sounds  Examination of the right-lower field reveals decreased breath sounds  Examination of the left-lower field reveals decreased breath sounds  Decreased breath sounds present  Comments: No conversational dyspnea  Abdominal:      General: Bowel sounds are normal       Palpations: Abdomen is soft  Tenderness: There is no abdominal tenderness  Comments: obese   Musculoskeletal:      Cervical back: Neck supple  Right lower leg: Edema (3+) present  Left lower leg: Edema (3+) present  Comments: Edema extends up to mid thigh   Noted scattered areas of weeping to distal, anterior legs  No open wounds   Skin:     General: Skin is warm and dry  Capillary Refill: Capillary refill takes less than 2 seconds  Neurological:      General: No focal deficit present  Mental Status: She is alert and oriented to person, place, and time  GCS: GCS eye subscore is 4  GCS verbal subscore is 5  GCS motor subscore is 6  Cranial Nerves: Cranial nerves are intact  Sensory: Sensation is intact  Motor: Motor function is intact  Coordination: Coordination is intact     Psychiatric:         Mood and Affect: Mood normal          Behavior: Behavior normal          Vital Signs  ED Triage Vitals [07/06/22 1637]   Temperature Pulse Respirations Blood Pressure SpO2   97 9 °F (36 6 °C) 67 20 161/67 95 %      Temp Source Heart Rate Source Patient Position - Orthostatic VS BP Location FiO2 (%)   Oral Monitor Sitting Right arm --      Pain Score       4           Vitals:    07/06/22 1815 07/06/22 1915 07/06/22 1922 07/06/22 2015   BP: 142/60 142/61 142/61 167/64   Pulse: (!) 50 (!) 44 (!) 51 66   Patient Position - Orthostatic VS: Lying  Lying          Visual Acuity      ED Medications  Medications   bumetanide (BUMEX) 12 5 mg infusion 50 mL (1 mg/hr Intravenous New Bag 7/6/22 1759)   allopurinol (ZYLOPRIM) tablet 300 mg (has no administration in time range)   amLODIPine (NORVASC) tablet 10 mg (has no administration in time range)   aspirin (ECOTRIN LOW STRENGTH) EC tablet 81 mg (has no administration in time range)   atorvastatin (LIPITOR) tablet 40 mg (has no administration in time range)   citalopram (CeleXA) tablet 40 mg (has no administration in time range)   clopidogrel (PLAVIX) tablet 75 mg (has no administration in time range)   gabapentin (NEURONTIN) capsule 400 mg (400 mg Oral Given 7/6/22 2110)   HYDROcodone-acetaminophen (NORCO) 5-325 mg per tablet 1 tablet (has no administration in time range)   isosorbide mononitrate (IMDUR) 24 hr tablet 60 mg (has no administration in time range)   levothyroxine tablet 50 mcg (has no administration in time range)   nitroglycerin (NITROSTAT) SL tablet 0 4 mg (has no administration in time range)   nystatin (MYCOSTATIN) powder ( Topical Given 7/6/22 2114)   OLANZapine (ZyPREXA) tablet 5 mg (has no administration in time range)   pantoprazole (PROTONIX) EC tablet 40 mg (40 mg Oral Given 7/6/22 2110)   tiZANidine (ZANAFLEX) tablet 4 mg (has no administration in time range)   potassium chloride (K-DUR,KLOR-CON) CR tablet 40 mEq (has no administration in time range) acetaminophen (TYLENOL) tablet 650 mg (has no administration in time range)   senna (SENOKOT) tablet 8 6 mg (has no administration in time range)   ondansetron (ZOFRAN) injection 4 mg (has no administration in time range)   heparin (porcine) subcutaneous injection 5,000 Units (5,000 Units Subcutaneous Given 7/6/22 2112)   insulin lispro (HumaLOG) 100 units/mL subcutaneous injection 2-12 Units (has no administration in time range)   insulin lispro (HumaLOG) 100 units/mL subcutaneous injection 2-12 Units (0 Units Subcutaneous Not Given 7/6/22 2119)   dextrose 50 % IV solution 25 mL (25 mL Intravenous Given 7/6/22 1709)   dextrose 50 % IV solution 25 mL (25 mL Intravenous Given 7/6/22 1758)   dextrose 50 % IV solution 25 mL (25 mL Intravenous Given 7/6/22 2013)       Diagnostic Studies  Results Reviewed     Procedure Component Value Units Date/Time    HS Troponin I 4hr [[id number]] Collected: 07/06/22 2105    Lab Status: In process Specimen: Blood from Arm, Left Updated: 07/06/22 2122    COVID/FLU/RSV [[id number]] Collected: 07/06/22 2105    Lab Status:  In process Specimen: Nares from Nose Updated: 07/06/22 2119    Fingerstick Glucose (POCT) [[id number]]  (Normal) Collected: 07/06/22 2051    Lab Status: Final result Updated: 07/06/22 2052     POC Glucose 121 mg/dl     Platelet count [[id number]]     Lab Status: No result Specimen: Blood     Fingerstick Glucose (POCT) [[id number]]  (Normal) Collected: 07/06/22 1956    Lab Status: Final result Updated: 07/06/22 1958     POC Glucose 84 mg/dl     Fingerstick Glucose (POCT) [[id number]]  (Abnormal) Collected: 07/06/22 1910    Lab Status: Final result Updated: 07/06/22 1915     POC Glucose 55 mg/dl     Fingerstick Glucose (POCT) [[id number]]  (Normal) Collected: 07/06/22 1842    Lab Status: Final result Updated: 07/06/22 1852     POC Glucose 74 mg/dl     HS Troponin I 2hr [[id number]]  (Abnormal) Collected: 07/06/22 1806    Lab Status: Final result Specimen: Blood from Line, Venous Updated: 07/06/22 1839     hs TnI 2hr 115 ng/L      Delta 2hr hsTnI -14 ng/L     NT-BNP PRO [[id number]]  (Abnormal) Collected: 07/06/22 1648    Lab Status: Final result Specimen: Blood from Arm, Left Updated: 07/06/22 1831     NT-proBNP 14,930 pg/mL     Fingerstick Glucose (POCT) [[id number]]  (Abnormal) Collected: 07/06/22 1751    Lab Status: Final result Updated: 07/06/22 1751     POC Glucose 56 mg/dl     HS Troponin 0hr (reflex protocol) [[id number]]  (Abnormal) Collected: 07/06/22 1648    Lab Status: Final result Specimen: Blood from Arm, Left Updated: 07/06/22 1734     hs TnI 0hr 129 ng/L     Comprehensive metabolic panel [[id number]]  (Abnormal) Collected: 07/06/22 1648    Lab Status: Final result Specimen: Blood from Arm, Left Updated: 07/06/22 1732     Sodium 146 mmol/L      Potassium 4 1 mmol/L      Chloride 107 mmol/L      CO2 24 mmol/L      ANION GAP 15 mmol/L      BUN 64 mg/dL      Creatinine 2 94 mg/dL      Glucose 46 mg/dL      Calcium 8 0 mg/dL      Corrected Calcium 8 9 mg/dL      AST 11 U/L      ALT 30 U/L      Alkaline Phosphatase 113 U/L      Total Protein 6 8 g/dL      Albumin 2 9 g/dL      Total Bilirubin 0 20 mg/dL      eGFR 16 ml/min/1 73sq m     Narrative:      [place] guidelines for Chronic Kidney Disease (CKD):     Stage 1 with normal or high GFR (GFR > 90 mL/min/1 73 square meters)    Stage 2 Mild CKD (GFR = 60-89 mL/min/1 73 square meters)    Stage 3A Moderate CKD (GFR = 45-59 mL/min/1 73 square meters)    Stage 3B Moderate CKD (GFR = 30-44 mL/min/1 73 square meters)    Stage 4 Severe CKD (GFR = 15-29 mL/min/1 73 square meters)    Stage 5 End Stage CKD (GFR <15 mL/min/1 73 square meters)  Note: GFR calculation is accurate only with a steady state creatinine    Protime-INR [[id number]]  (Normal) Collected: 07/06/22 1648    Lab Status: Final result Specimen: Blood from Arm, Left Updated: 07/06/22 1719     Protime 13 4 seconds      INR 1 05    APTT [[id number]]  (Normal) Collected: 07/06/22 1648    Lab Status: Final result Specimen: Blood from Arm, Left Updated: 07/06/22 1719     PTT 30 seconds     Fingerstick Glucose (POCT) [[id number]]  (Abnormal) Collected: 07/06/22 1657    Lab Status: Final result Updated: 07/06/22 1703     POC Glucose 42 mg/dl     CBC and differential [[id number]]  (Abnormal) Collected: 07/06/22 1648    Lab Status: Final result Specimen: Blood from Arm, Left Updated: 07/06/22 1659     WBC 10 99 Thousand/uL      RBC 2 62 Million/uL      Hemoglobin 8 5 g/dL      Hematocrit 27 1 %       fL      MCH 32 4 pg      MCHC 31 4 g/dL      RDW 16 7 %      MPV 9 7 fL      Platelets 735 Thousands/uL      nRBC 0 /100 WBCs      Neutrophils Relative 71 %      Immat GRANS % 0 %      Lymphocytes Relative 17 %      Monocytes Relative 9 %      Eosinophils Relative 2 %      Basophils Relative 1 %      Neutrophils Absolute 7 83 Thousands/µL      Immature Grans Absolute 0 04 Thousand/uL      Lymphocytes Absolute 1 83 Thousands/µL      Monocytes Absolute 1 03 Thousand/µL      Eosinophils Absolute 0 18 Thousand/µL      Basophils Absolute 0 08 Thousands/µL                  XR chest 1 view portable    (Results Pending)              Procedures  Procedures         ED Course  ED Course as of 07/06/22 2124 Wed Jul 06, 2022 1718 Patient is a 51-year-old female with multiple comorbidities coming in today for worsening shortness of breath, water retention was sent in by her cardiologist for admission  On exam patient is well-appearing in no acute distress  Her fingerstick glucose was 42 and was given dextrose  Will recheck  Will also give p o  She is edematous without any conversational dyspnea  Will start medical workup and have patient admitted      Portions of the record may have been created with voice recognition software   Occasional wrong word or "sound a like" substitutions may have occurred due to the inherent limitations of voice recognition software  Read the chart carefully and recognize, using context, where substitutions have occurred  1728 Glucose 42 and given dextrose  Will repeat glucose  Hemoglobin 8 5 and stable   1738 Troponin is 12 9 however compared to 1 week ago greater than 9000 is decreasing  No ischemic changes on EKG  Creatinine 2 9 which is mildly increased  D/w Dr Terry Riley - wishes for SLIM admit and to start Bumex gtt       [id number] Discussed with Dr Koko Burr  We had a detailed discussion of the patient's condition and case,  including need for admission  Accepts to his/her service  Bed request/bridging orders placed  1756 Glucose 56  Will repeat dextrose   1921 Trop 115 and decreasing  Glucose low  Will replace             HEART Risk Score    Flowsheet Row Most Recent Value   Heart Score Risk Calculator    History 1 Filed at: 07/06/2022 1801   ECG 1 Filed at: 07/06/2022 1801   Age 1 Filed at: 07/06/2022 1801   Risk Factors 2 Filed at: 07/06/2022 1801   Troponin 2 Filed at: 07/06/2022 1801   HEART Score 7 Filed at: 07/06/2022 1801                                      MDM  Number of Diagnoses or Management Options  Diagnosis management comments:     EKG INTERPRETATION @ 1708  RHYTHM:  Normal sinus rhythm at 68 beats per minute  AXIS:  Normal axis  INTERVALS:  HI interval measured at 164 milliseconds  QRS COMPLEX:  QRS measured at 152 milliseconds  Left bundle branch block  ST SEGMENT:  Nonspecific ST segment changes    Diffuse artifact  QT INTERVAL:  QTC measured at 457 milliseconds  COMPARED WITH PRIOR no acute change compared to June 22nd  Interpretation by Britney Lion DO    Differential diagnosis includes but not limited to:  COPD exacerbation, asthma exacerbation, pneumonia, PE, pulmonary edema, pulmonary effusions, lung mass/malignancy, CHF, ACS, NSTEMI, acute kidney injury, electrolyte dysfunction, inhalation injury, anemia, valvular disorder, viral etiology,         Amount and/or Complexity of Data Reviewed  Clinical lab tests: ordered and reviewed  Tests in the radiology section of CPT®: reviewed and ordered  Tests in the medicine section of CPT®: ordered and reviewed  Review and summarize past medical records: yes  Independent visualization of images, tracings, or specimens: yes        Disposition  Final diagnoses:   CHF (congestive heart failure) ([facility])   Elevated troponin   CKD (chronic kidney disease)   Dyspnea   Chest pain   Hypoglycemia     Time reflects when diagnosis was documented in both MDM as applicable and the Disposition within this note     Time User Action Codes Description Comment    7/6/2022  5:44 PM Bendock, Sedrick Osbaldo L Add [I50 9] CHF (congestive heart failure) ([facility] [address] )     7/6/2022  5:44 PM Bendock, Sedrick Osbaldo L Add [R77 8] Elevated troponin     7/6/2022  5:44 PM Bendock, Sedrick Osbaldo L Add [N18 9] CKD (chronic kidney disease)     7/6/2022  5:44 PM Bendock, Sedrick Osbaldo L Add [R06 00] Dyspnea     7/6/2022  5:44 PM Bendock, Sedrick Osbaldo L Add [R07 9] Chest pain     7/6/2022  6:19 PM Bendock, Radalisha Constanza Add [E16 2] Hypoglycemia       ED Disposition     ED Disposition   Admit    Condition   Stable    Date/Time   Wed Jul 6, 2022  5:44 PM    Comment   Case was discussed with Dr Donato Gupta and Dr Rolando Baron and the patient's admission status was agreed to be Admission Status: inpatient status to the service of Dr Donato Gupta   Follow-up Information    None         Patient's Medications   Discharge Prescriptions    No medications on file       No discharge procedures on file      PDMP Review       Value Time User    PDMP Reviewed  Yes 6/26/2022 11:49 AM Goldie Camacho DO          ED Provider  Electronically Signed by           Kayleigh Ahuja DO  07/06/22 8154

## 2022-07-06 NOTE — ASSESSMENT & PLAN NOTE
Wt Readings from Last 3 Encounters:   07/06/22 135 kg (298 lb 1 oz)   07/06/22 136 kg (300 lb)   07/02/22 128 kg (281 lb 12 oz)     With a history of combined systolic and diastolic CHF, most recent echocardiogram revealing ejection fraction of 30%    · Patient recently admitted and underwent cardiac catheterization on 07/01 with PCI to the mid circumflex artery  · Followed up with Cardiology today, noted 20 lb weight gain since discharge 4 days ago  · Has been compliant with medications, maintained on Demadex 40 mg daily as an outpatient  · BNP elevated at 14,930  · Baseline weight approximately 280  · Monitor daily weights, monitor I's and O's  · Will initiate on Bumex drip per Cardiology recommendations

## 2022-07-06 NOTE — H&P
[address] [facility]  H&P- Cheri Safe 1960, 63 y o  female MRN: [id number]  Unit/Bed#: ED 29 Encounter: [id number]  Primary Care Provider: CHERELLE Robertson   Date and time admitted to hospital: 7/6/2022  4:59 PM    * 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 heart failure (HCC)  Assessment & Plan  Wt Readings from Last 3 Encounters:   07/06/22 135 kg (298 lb 1 oz)   07/06/22 136 kg (300 lb)   07/02/22 128 kg (281 lb 12 oz)     With a history of combined systolic and diastolic CHF, most recent echocardiogram revealing ejection fraction of 30%    · Patient recently admitted and underwent cardiac catheterization on 07/01 with PCI to the mid circumflex artery  · Followed up with Cardiology today, noted 20 lb weight gain since discharge 4 days ago  · Has been compliant with medications, maintained on Demadex 40 mg daily as an outpatient  · BNP elevated at 14,930  · Baseline weight approximately 280  · Monitor daily weights, monitor I's and O's  · Will initiate on Bumex drip per Cardiology recommendations        Mixed hyperlipidemia  Assessment & Plan  Lipitor 40    Acquired hypothyroidism  Assessment & Plan  Synthroid 50 mcg daily    Stage 4 chronic kidney disease [facility])  Assessment & Plan  Lab Results   Component Value Date    EGFR 16 07/06/2022    EGFR 24 07/02/2022    EGFR 26 07/01/2022    CREATININE 2 94 (H) 07/06/2022    CREATININE 2 16 (H) 07/02/2022    CREATININE 2 04 (H) 07/01/2022   With elevation in creatinine, will monitor as patient will be started on Bumex drip  · Consider Nephrology consultation  · Avoid hypotension  · Suspect that elevation in creatinine likely secondary to cardiorenal syndrome in the setting of severe volume overload    Anemia  Assessment & Plan  With history of anemia of chronic disease, hemoglobin currently at baseline    CAD (coronary artery disease)  Assessment & Plan  Status post stenting in 2012, then revised in 2017  · Known chronic total occlusion of RCA  · Most recent cardiac catheterization on 07/01 with PCI to mid circumflex  · Continue is Plavix, Lipitor, aspirin, Imdur, bisoprolol    Type 2 diabetes mellitus with stage 4 chronic kidney disease, with long-term current use of insulin [facility])  Assessment & Plan  Lab Results   Component Value Date    HGBA1C 7 3 (A) 03/29/2022       Recent Labs     07/06/22  1657 07/06/22  1751   POCGLU 42* 56*       Blood Sugar Average: Last 72 hrs:  (P) 49   Patient is maintained on Lantus 50 units b i d  And scheduled Humalog 20 units t i d  With associated sliding scale  · with hypoglycemia, most recent blood sugar checked was 79  · Will decrease patient's insulin regimen while inpatient  · Sliding scale insulin a c  HS      VTE Pharmacologic Prophylaxis:   Moderate Risk (Score 3-4) - Pharmacological DVT Prophylaxis Ordered: heparin  Code Status: Prior full code  Discussion with family: Patient declined call to   Anticipated Length of Stay: Patient will be admitted on an inpatient basis with an anticipated length of stay of greater than 2 midnights secondary to Diuresis  Total Time for Visit, including Counseling / Coordination of Care: 70 minutes Greater than 50% of this total time spent on direct patient counseling and coordination of care  Chief Complaint:  Volume overload    History of Present Illness:  Osmani Bagley is a 63 y o  female with a PMH of coronary artery disease status post PCI 7/1, known chronic occlusion of right coronary artery, CKD, EUN, dyslipidemia, diabetes who presents with 20 lb weight gain  The patient presents from Cardiology office  Of note, she was recently discharged 4 days ago after being admitted for NSTEMI for which she underwent PCI with stenting of mid circumflex artery  She notes approximately 20 lb weight gain over the last few days despite taking medications as prescribed including torsemide 40 mg daily    She also reports worsening shortness of breath especially with walking, even short distances as well as associated chest discomfort  She has difficult time lying flat  Discussed with Cardiology who recommends continuing aspirin, Plavix, Lipitor and initiating patient on Bumex drip at 1 mg an hour  Review of Systems:  Review of Systems   Constitutional: Negative for appetite change, chills, fever and unexpected weight change  HENT: Negative for hearing loss, rhinorrhea and sore throat  Eyes: Negative for pain, redness and visual disturbance  Respiratory: Positive for shortness of breath  Negative for cough and chest tightness  Cardiovascular: Positive for chest pain and leg swelling  Negative for palpitations  Gastrointestinal: Negative for constipation, diarrhea, nausea and vomiting  Endocrine: Negative for polydipsia, polyphagia and polyuria  Genitourinary: Negative for dysuria, frequency and urgency  Neurological: Negative for dizziness, light-headedness and headaches         Past Medical and Surgical History:   Past Medical History:   Diagnosis Date    Abnormal liver function     Anemia     Anxiety     Arthritis     Chronic kidney disease     stage 3    Chronic narcotic dependence (HCC)     Chronic pain disorder     lower back, hands , neck and knees    Coronary artery disease     Depression     Diabetes mellitus ([facility] [place] [address] [facility]     Elevated troponin 2/11/2022    GERD (gastroesophageal reflux disease)     no meds at present    Heart murmur     murmur    Hyperlipidemia     Hypertension     Neurogenic bladder     Open toe wound 12/2020    right big toe open calus but is dry at present    Renal disorder     Shortness of breath     exertion    Sleep apnea     doesn't use cpap    Suprapubic catheter [facility])        Past Surgical History:   Procedure Laterality Date    AMPUTATION      ANGIOPLASTY  2017    5    BREAST EXCISIONAL BIOPSY Left     BREAST SURGERY      CARDIAC CATHETERIZATION      CARDIAC CATHETERIZATION N/A 7/1/2022    Procedure: Cardiac catheterization;  Surgeon: Gisselle Roque MD;  Location: [facility] CARDIAC [facility]; Service: Cardiology    CARDIAC CATHETERIZATION N/A 7/1/2022    Procedure: Cardiac pci;  Surgeon: Gisselle Roque MD;  Location: [facility]; Service: Cardiology    CARPAL TUNNEL RELEASE Bilateral     CERVICAL FUSION      HYSTERECTOMY  2008    IR SUPRAPUBIC CATHETER PLACEMENT  6/15/2021    KNEE SURGERY      OOPHORECTOMY  2008    LA EXC SKIN BENIG 3 1-4 CM REMAINDR BODY N/A 12/21/2020    Procedure: EXCISION SEBACEOUS CYST X 5 SCALP;  Surgeon: Cleopatra Guerrero MD;  Location: [facility] MAIN OR;  Service: General    TOE AMPUTATION Left     TRIGGER FINGER RELEASE Right     4th Finger       Meds/Allergies:  Prior to Admission medications    Medication Sig Start Date End Date Taking?  Authorizing Provider   allopurinol (ZYLOPRIM) 300 mg tablet Take 1 tablet (300 mg total) by mouth daily 3/29/22  Yes CHERELLE Shi   amLODIPine (NORVASC) 10 mg tablet Take 1 tablet (10 mg total) by mouth daily 7/2/22  Yes Nedra Bess MD   aspirin (ECOTRIN LOW STRENGTH) 81 mg EC tablet Take 1 tablet (81 mg total) by mouth daily 3/29/22  Yes CHERELLE Shi   atorvastatin (LIPITOR) 40 mg tablet Take 1 tablet (40 mg total) by mouth daily 3/29/22  Yes CHERELLE Shi   bisoprolol (ZEBETA) 5 mg tablet Take 0 5 tablets (2 5 mg total) by mouth daily 7/2/22  Yes Nedra Bess MD   citalopram (CeleXA) 40 mg tablet Take 1 tablet (40 mg total) by mouth daily 3/29/22  Yes CHERELLE Shi   clopidogrel (PLAVIX) 75 mg tablet Take 1 tablet (75 mg total) by mouth daily 3/29/22  Yes CHERELLE Shi   Dulaglutide (Trulicity) 1 5 WG/5 9DC SOPN Inject 0 5 mL (1 5 mg total) under the skin once a week 3/29/22  Yes CHERELLE Shi   ferrous sulfate 325 (65 Fe) mg tablet Take 1 tablet (325 mg total) by mouth every other day 3/29/22  Yes CHERELLE Shi   gabapentin (NEURONTIN) 800 mg tablet Take 1 tablet (800 mg total) by mouth 4 (four) times a day 3/29/22  Yes CHERELLE Couch   HYDROcodone-acetaminophen (NORCO) 5-325 mg per tablet Take 1 tablet by mouth 3 (three) times a day as needed for pain For ongoing pain Max Daily Amount: 3 tablets 5/3/22  Yes CHERELLE Couch   insulin glargine (Lantus SoloStar) 100 units/mL injection pen Inject 50 Units under the skin every 12 (twelve) hours 5/3/22  Yes CHERELLE Couch   insulin lispro (HumaLOG) 100 units/mL injection pen INJECT 20 UNITS UNDER THE SKIN 3 (THREE) TIMES A DAY WITH MEALS 7/6/22  Yes CHERELLE Couch   Insulin Pen Needle (BD Pen Needle Micro U/F) 32G X 6 MM MISC Use 5 (five) times a day 3/29/22  Yes CHERELLE Couch   Insulin Syringe-Needle U-100 (BD Veo Insulin Syringe U/F) 31G X 15/64" 0 5 ML MISC Inject under the skin 3 (three) times a day 3/29/22  Yes CHERELLE Couch   isosorbide mononitrate (IMDUR) 60 mg 24 hr tablet TAKE 1 TABLET BY MOUTH EVERY DAY 3/1/22  Yes Naren Remy DO   Lancets (OneTouch Delica Plus GNHIND68R) MISC USE TO TEST 3 TIMES DAILY 3/10/21  Yes CHERELLE Sebastian   levothyroxine 50 mcg tablet TAKE 1 TABLET BY MOUTH EVERY DAY 2/4/22  Yes CHERELLE Couch   loperamide (IMODIUM) 2 mg capsule Take 1 capsule (2 mg total) by mouth 4 (four) times a day as needed for diarrhea 6/19/22  Yes Brett Fleming DO   naloxone [facility]) 4 mg/0 1 mL nasal spray Administer 1 spray into a nostril  If breathing does not return to normal or if breathing difficulty resumes after 2-3 minutes, give another dose in the other nostril using a new spray   3/10/21  Yes CHERELLE Sebastian   nitroglycerin (NITROSTAT) 0 4 mg SL tablet Place 1 tablet (0 4 mg total) under the tongue every 5 (five) minutes as needed for chest pain 4/26/21  Yes Naren Remy,    nystatin (MYCOSTATIN) powder Apply topically 2 (two) times a day 9/10/21  Yes Tim Valverde PA-C   OLANZapine (ZyPREXA) 5 mg tablet Take 1 tablet (5 mg total) by mouth daily at bedtime 3/29/22  Yes CHERELLE Jacobsen   ondansetron (ZOFRAN-ODT) 4 mg disintegrating tablet Take 1 tablet (4 mg total) by mouth every 8 (eight) hours as needed for nausea or vomiting 6/19/22  Yes Jatinder Hess,    OneTouch Ultra test strip USE 1 EACH DAILY USE AS INSTRUCTED 2/21/22  Yes CHERELLE Jacobsen   pantoprazole (PROTONIX) 40 mg tablet TAKE 1 TABLET BY MOUTH TWICE A DAY 2/21/22  Yes John Paul Mulligan MD   tiZANidine (ZANAFLEX) 4 mg tablet TAKE 1 TABLET (4 MG TOTAL) BY MOUTH EVERY 8 (EIGHT) HOURS AS NEEDED FOR MUSCLE SPASMS 5/27/22  Yes CHERELLE Jacobsen   torsemide (DEMADEX) 20 mg tablet Take 2 tablets (40 mg total) by mouth daily 7/4/22 8/3/22 Yes Peg Winchester PA-C   insulin lispro (HumaLOG) 100 units/mL injection pen Inject 20 Units under the skin 3 (three) times a day with meals 3/29/22 7/6/22  CHERELLE Jacobsen   oxygen gas Inhale 2 L/min continuous  Indications: Respiratory Failure 1/1/20 6/11/22  Historical Provider, MD     I have reviewed home medications with patient personally  Allergies:    Allergies   Allergen Reactions    Codeine      Other reaction(s): Nausea and Vomiting    Latex Itching       Social History:  Marital Status:    Occupation: unknown  Patient Pre-hospital Living Situation: Home  Patient Pre-hospital Level of Mobility: unable to be assessed at time of evaluation  Patient Pre-hospital Diet Restrictions: none  Substance Use History:   Social History     Substance and Sexual Activity   Alcohol Use Not Currently     Social History     Tobacco Use   Smoking Status Former Smoker    Packs/day: 1 00    Years: 35 00    Pack years: 35 00    Types: Cigarettes    Quit date: 2012    Years since quitting: 10 5   Smokeless Tobacco Never Used     Social History     Substance and Sexual Activity   Drug Use Never       Family History:  Family History   Problem Relation Age of Onset    Stroke Father     Heart disease Father     No Known Problems Mother     No Known Problems Sister     No Known Problems Daughter     No Known Problems Maternal Grandmother     No Known Problems Maternal Grandfather     No Known Problems Paternal Grandmother     No Known Problems Paternal Grandfather     No Known Problems Maternal Aunt     No Known Problems Maternal Aunt     No Known Problems Maternal Aunt     No Known Problems Maternal Aunt     No Known Problems Maternal Aunt     No Known Problems Maternal Aunt     No Known Problems Paternal Aunt        Physical Exam:     Vitals:   Blood Pressure: 142/60 (07/06/22 1815)  Pulse: (!) 50 (07/06/22 1815)  Temperature: 97 9 °F (36 6 °C) (07/06/22 1637)  Temp Source: Oral (07/06/22 1637)  Respirations: 13 (07/06/22 1815)  Weight - Scale: 135 kg (298 lb 1 oz) (07/06/22 1634)  SpO2: 96 % (07/06/22 1815)    Physical Exam  Vitals and nursing note reviewed  Constitutional:       General: She is not in acute distress  Appearance: Normal appearance  She is obese  She is not ill-appearing, toxic-appearing or diaphoretic  Eyes:      General: No scleral icterus  Conjunctiva/sclera: Conjunctivae normal    Cardiovascular:      Rate and Rhythm: Normal rate and regular rhythm  Heart sounds: No murmur heard  No friction rub  No gallop  Pulmonary:      Effort: Pulmonary effort is normal  No respiratory distress  Breath sounds: No stridor  Rales present  No wheezing or rhonchi  Chest:      Chest wall: No tenderness  Abdominal:      General: Abdomen is flat  Bowel sounds are normal  There is no distension  Palpations: Abdomen is soft  Tenderness: There is no abdominal tenderness  Musculoskeletal:      Right lower leg: Edema present  Left lower leg: Edema present  Comments: 3+ bilateral pitting edema   Skin:     General: Skin is warm and dry  Coloration: Skin is not jaundiced or pale  Neurological:      General: No focal deficit present        Mental Status: She is alert and oriented to person, place, and time  Mental status is at baseline  Additional Data:     Lab Results:  Results from last 7 days   Lab Units 07/06/22  1648   WBC Thousand/uL 10 99*   HEMOGLOBIN g/dL 8 5*   HEMATOCRIT % 27 1*   PLATELETS Thousands/uL 351   NEUTROS PCT % 71   LYMPHS PCT % 17   MONOS PCT % 9   EOS PCT % 2     Results from last 7 days   Lab Units 07/06/22  1648   SODIUM mmol/L 146*   POTASSIUM mmol/L 4 1   CHLORIDE mmol/L 107   CO2 mmol/L 24   BUN mg/dL 64*   CREATININE mg/dL 2 94*   ANION GAP mmol/L 15*   CALCIUM mg/dL 8 0*   ALBUMIN g/dL 2 9*   TOTAL BILIRUBIN mg/dL 0 20   ALK PHOS U/L 113   ALT U/L 30   AST U/L 11   GLUCOSE RANDOM mg/dL 46*     Results from last 7 days   Lab Units 07/06/22  1648   INR  1 05     Results from last 7 days   Lab Units 07/06/22  1842 07/06/22  1751 07/06/22  1657 07/02/22  1055 07/02/22  0736 07/01/22  2101 07/01/22  1537 07/01/22  1108 07/01/22  0726 06/30/22  2106 06/30/22  1640 06/30/22  1113   POC GLUCOSE mg/dl 74 56* 42* 100 86 229* 87 103 119 125 97 194*               Imaging: Reviewed radiology reports from this admission including: chest xray  XR chest 1 view portable    (Results Pending)       EKG and Other Studies Reviewed on Admission:   · EKG: Sinus Bradycardia  HR 50     ** Please Note: This note has been constructed using a voice recognition system   **

## 2022-07-06 NOTE — PROGRESS NOTES
I received a call from the patient concerned after she weighed herself this morning; weight was 302  She states she is "bloated" with edema of her abdominal area and her legs that she describes as "real bad"  She notes she had chest pain last night which was worse lying down and stated she did not need to take nitro for it  The patient reports she continues with shortness of breath  I asked the patient if she called Cardiology because of the weight gain and she noted she has an appointment with them this afternoon  I asked the patient to go to the ED because of the severity of her symptoms and her recent hospitalization  She stated she wanted to shower first but agreed to go  I will continue to follow

## 2022-07-06 NOTE — ASSESSMENT & PLAN NOTE
Lab Results   Component Value Date    EGFR 16 07/06/2022    EGFR 24 07/02/2022    EGFR 26 07/01/2022    CREATININE 2 94 (H) 07/06/2022    CREATININE 2 16 (H) 07/02/2022    CREATININE 2 04 (H) 07/01/2022   With elevation in creatinine, will monitor as patient will be started on Bumex drip  · Consider Nephrology consultation  · Avoid hypotension  · Suspect that elevation in creatinine likely secondary to cardiorenal syndrome in the setting of severe volume overload

## 2022-07-06 NOTE — ASSESSMENT & PLAN NOTE
Status post stenting in 2012, then revised in 2017  · Known chronic total occlusion of RCA  · Most recent cardiac catheterization on 07/01 with PCI to mid circumflex  · Continue is Plavix, Lipitor, aspirin, Imdur, bisoprolol

## 2022-07-06 NOTE — ED NOTES
Patient given sandwich and juice for BG of 55  Dr Crispin Ruvalcaba made aware  Patient asymptomatic at this time  Alert and oriented x 4  Will recheck blood sugar after patient is done eating        Guy Merino RN  07/06/22 [id number]

## 2022-07-06 NOTE — ASSESSMENT & PLAN NOTE
Lab Results   Component Value Date    HGBA1C 7 3 (A) 03/29/2022       Recent Labs     07/06/22  1657 07/06/22  1751   POCGLU 42* 56*       Blood Sugar Average: Last 72 hrs:  (P) 49   Patient is maintained on Lantus 50 units b i d  And scheduled Humalog 20 units t i d  With associated sliding scale    · with hypoglycemia, most recent blood sugar checked was 79  · Will decrease patient's insulin regimen while inpatient  · Sliding scale insulin a c  HS

## 2022-07-07 LAB
ANION GAP SERPL CALCULATED.3IONS-SCNC: 16 MMOL/L (ref 4–13)
BASOPHILS # BLD AUTO: 0.05 THOUSANDS/ΜL (ref 0–0.1)
BASOPHILS NFR BLD AUTO: 1 % (ref 0–1)
BUN SERPL-MCNC: 61 MG/DL (ref 5–25)
CALCIUM SERPL-MCNC: 7.6 MG/DL (ref 8.3–10.1)
CHLORIDE SERPL-SCNC: 107 MMOL/L (ref 100–108)
CO2 SERPL-SCNC: 21 MMOL/L (ref 21–32)
CREAT SERPL-MCNC: 2.65 MG/DL (ref 0.6–1.3)
EOSINOPHIL # BLD AUTO: 0.2 THOUSAND/ΜL (ref 0–0.61)
EOSINOPHIL NFR BLD AUTO: 3 % (ref 0–6)
ERYTHROCYTE [DISTWIDTH] IN BLOOD BY AUTOMATED COUNT: 16.3 % (ref 11.6–15.1)
GFR SERPL CREATININE-BSD FRML MDRD: 19 ML/MIN/1.73SQ M
GLUCOSE SERPL-MCNC: 119 MG/DL (ref 65–140)
GLUCOSE SERPL-MCNC: 149 MG/DL (ref 65–140)
GLUCOSE SERPL-MCNC: 192 MG/DL (ref 65–140)
GLUCOSE SERPL-MCNC: 195 MG/DL (ref 65–140)
GLUCOSE SERPL-MCNC: 47 MG/DL (ref 65–140)
GLUCOSE SERPL-MCNC: 57 MG/DL (ref 65–140)
HCT VFR BLD AUTO: 24.6 % (ref 34.8–46.1)
HGB BLD-MCNC: 7.6 G/DL (ref 11.5–15.4)
IMM GRANULOCYTES # BLD AUTO: 0.03 THOUSAND/UL (ref 0–0.2)
IMM GRANULOCYTES NFR BLD AUTO: 0 % (ref 0–2)
LYMPHOCYTES # BLD AUTO: 1.87 THOUSANDS/ΜL (ref 0.6–4.47)
LYMPHOCYTES NFR BLD AUTO: 25 % (ref 14–44)
MCH RBC QN AUTO: 31.7 PG (ref 26.8–34.3)
MCHC RBC AUTO-ENTMCNC: 30.9 G/DL (ref 31.4–37.4)
MCV RBC AUTO: 103 FL (ref 82–98)
MONOCYTES # BLD AUTO: 0.63 THOUSAND/ΜL (ref 0.17–1.22)
MONOCYTES NFR BLD AUTO: 9 % (ref 4–12)
NEUTROPHILS # BLD AUTO: 4.65 THOUSANDS/ΜL (ref 1.85–7.62)
NEUTS SEG NFR BLD AUTO: 62 % (ref 43–75)
NRBC BLD AUTO-RTO: 0 /100 WBCS
PLATELET # BLD AUTO: 298 THOUSANDS/UL (ref 149–390)
PMV BLD AUTO: 10 FL (ref 8.9–12.7)
POTASSIUM SERPL-SCNC: 4 MMOL/L (ref 3.5–5.3)
RBC # BLD AUTO: 2.4 MILLION/UL (ref 3.81–5.12)
SODIUM SERPL-SCNC: 144 MMOL/L (ref 136–145)
WBC # BLD AUTO: 7.43 THOUSAND/UL (ref 4.31–10.16)

## 2022-07-07 PROCEDURE — 99222 1ST HOSP IP/OBS MODERATE 55: CPT | Performed by: INTERNAL MEDICINE

## 2022-07-07 PROCEDURE — 82948 REAGENT STRIP/BLOOD GLUCOSE: CPT

## 2022-07-07 PROCEDURE — 99232 SBSQ HOSP IP/OBS MODERATE 35: CPT | Performed by: PHYSICIAN ASSISTANT

## 2022-07-07 PROCEDURE — 85025 COMPLETE CBC W/AUTO DIFF WBC: CPT | Performed by: PHYSICIAN ASSISTANT

## 2022-07-07 PROCEDURE — 80048 BASIC METABOLIC PNL TOTAL CA: CPT | Performed by: PHYSICIAN ASSISTANT

## 2022-07-07 RX ORDER — INSULIN LISPRO 100 [IU]/ML
2-12 INJECTION, SOLUTION INTRAVENOUS; SUBCUTANEOUS
Status: DISCONTINUED | OUTPATIENT
Start: 2022-07-08 | End: 2022-07-19 | Stop reason: HOSPADM

## 2022-07-07 RX ADMIN — ATORVASTATIN CALCIUM 40 MG: 40 TABLET, FILM COATED ORAL at 08:29

## 2022-07-07 RX ADMIN — ASPIRIN 81 MG: 81 TABLET, COATED ORAL at 08:29

## 2022-07-07 RX ADMIN — Medication 1 MG/HR: at 03:40

## 2022-07-07 RX ADMIN — HEPARIN SODIUM 5000 UNITS: 5000 INJECTION INTRAVENOUS; SUBCUTANEOUS at 21:54

## 2022-07-07 RX ADMIN — HEPARIN SODIUM 5000 UNITS: 5000 INJECTION INTRAVENOUS; SUBCUTANEOUS at 06:14

## 2022-07-07 RX ADMIN — HYDROCODONE BITARTRATE AND ACETAMINOPHEN 1 TABLET: 5; 325 TABLET ORAL at 21:50

## 2022-07-07 RX ADMIN — PANTOPRAZOLE SODIUM 40 MG: 40 TABLET, DELAYED RELEASE ORAL at 06:14

## 2022-07-07 RX ADMIN — Medication 1 MG/HR: at 11:34

## 2022-07-07 RX ADMIN — LEVOTHYROXINE SODIUM 50 MCG: 50 TABLET ORAL at 06:14

## 2022-07-07 RX ADMIN — NYSTATIN: 100000 POWDER TOPICAL at 17:23

## 2022-07-07 RX ADMIN — AMLODIPINE BESYLATE 10 MG: 10 TABLET ORAL at 08:29

## 2022-07-07 RX ADMIN — GABAPENTIN 400 MG: 400 CAPSULE ORAL at 11:33

## 2022-07-07 RX ADMIN — POTASSIUM CHLORIDE 40 MEQ: 1500 TABLET, EXTENDED RELEASE ORAL at 08:45

## 2022-07-07 RX ADMIN — OLANZAPINE 5 MG: 5 TABLET, FILM COATED ORAL at 21:50

## 2022-07-07 RX ADMIN — ACETAMINOPHEN 650 MG: 325 TABLET, FILM COATED ORAL at 19:51

## 2022-07-07 RX ADMIN — Medication 1 MG/HR: at 23:01

## 2022-07-07 RX ADMIN — INSULIN LISPRO 2 UNITS: 100 INJECTION, SOLUTION INTRAVENOUS; SUBCUTANEOUS at 17:23

## 2022-07-07 RX ADMIN — PANTOPRAZOLE SODIUM 40 MG: 40 TABLET, DELAYED RELEASE ORAL at 17:23

## 2022-07-07 RX ADMIN — GABAPENTIN 400 MG: 400 CAPSULE ORAL at 21:51

## 2022-07-07 RX ADMIN — HEPARIN SODIUM 5000 UNITS: 5000 INJECTION INTRAVENOUS; SUBCUTANEOUS at 13:14

## 2022-07-07 RX ADMIN — SENNOSIDES 8.6 MG: 8.6 TABLET, FILM COATED ORAL at 08:45

## 2022-07-07 RX ADMIN — HYDROCODONE BITARTRATE AND ACETAMINOPHEN 1 TABLET: 5; 325 TABLET ORAL at 15:35

## 2022-07-07 RX ADMIN — ALLOPURINOL 300 MG: 100 TABLET ORAL at 08:29

## 2022-07-07 RX ADMIN — NYSTATIN: 100000 POWDER TOPICAL at 08:29

## 2022-07-07 RX ADMIN — HYDROCODONE BITARTRATE AND ACETAMINOPHEN 1 TABLET: 5; 325 TABLET ORAL at 08:45

## 2022-07-07 RX ADMIN — GABAPENTIN 400 MG: 400 CAPSULE ORAL at 08:29

## 2022-07-07 RX ADMIN — CITALOPRAM HYDROBROMIDE 40 MG: 20 TABLET ORAL at 08:29

## 2022-07-07 RX ADMIN — GABAPENTIN 400 MG: 400 CAPSULE ORAL at 17:22

## 2022-07-07 RX ADMIN — ISOSORBIDE MONONITRATE 60 MG: 60 TABLET, EXTENDED RELEASE ORAL at 08:29

## 2022-07-07 RX ADMIN — CLOPIDOGREL BISULFATE 75 MG: 75 TABLET ORAL at 08:29

## 2022-07-07 RX ADMIN — INSULIN LISPRO 2 UNITS: 100 INJECTION, SOLUTION INTRAVENOUS; SUBCUTANEOUS at 21:51

## 2022-07-07 NOTE — ASSESSMENT & PLAN NOTE
Wt Readings from Last 3 Encounters:   07/07/22 133 kg (292 lb 8 8 oz)   07/06/22 136 kg (300 lb)   07/02/22 128 kg (281 lb 12 oz)     With a history of combined systolic and diastolic CHF, most recent echocardiogram revealing ejection fraction of 30%    · Patient recently admitted and underwent cardiac catheterization on 07/01 with PCI to the mid circumflex artery  · Followed up with Cardiology today, noted 20 lb weight gain since discharge 4 days ago  · Has been compliant with medications, maintained on Demadex 40 mg daily as an outpatient  · BNP elevated at 14,930  · Baseline weight approximately 280  · Weight on arrival 298, most recent 292-6 lb down  · Monitor daily weights, monitor I's and O's  · Will initiate on Bumex drip per Cardiology recommendations

## 2022-07-07 NOTE — PLAN OF CARE
Problem: PAIN - ADULT  Goal: Verbalizes/displays adequate comfort level or baseline comfort level  Description: Interventions:  - Encourage patient to monitor pain and request assistance  - Assess pain using appropriate pain scale  - Administer analgesics based on type and severity of pain and evaluate response  - Implement non-pharmacological measures as appropriate and evaluate response  - Consider cultural and social influences on pain and pain management  - Notify physician/advanced practitioner if interventions unsuccessful or patient reports new pain  Outcome: Progressing     Problem: INFECTION - ADULT  Goal: Absence or prevention of progression during hospitalization  Description: INTERVENTIONS:  - Assess and monitor for signs and symptoms of infection  - Monitor lab/diagnostic results  - Monitor all insertion sites, i e  indwelling lines, tubes, and drains  - Monitor endotracheal if appropriate and nasal secretions for changes in amount and color  - [place] appropriate cooling/warming therapies per order  - Administer medications as ordered  - Instruct and encourage patient and family to use good hand hygiene technique  - Identify and instruct in appropriate isolation precautions for identified infection/condition  Outcome: Progressing     Problem: SAFETY ADULT  Goal: Patient will remain free of falls  Description: INTERVENTIONS:  - Educate patient/family on patient safety including physical limitations  - Instruct patient to call for assistance with activity   - Consult OT/PT to assist with strengthening/mobility   - Keep Call bell within reach  - Keep bed low and locked with side rails adjusted as appropriate  - Keep care items and personal belongings within reach  - Initiate and maintain comfort rounds  - Make Fall Risk Sign visible to staff  - Offer Toileting every 2 Hours, in advance of need  - Initiate/Maintain alarm  - Obtain necessary fall risk management equipment  - Apply yellow socks and bracelet for high fall risk patients  - Consider moving patient to room near nurses station  Outcome: Progressing  Goal: Maintain or return to baseline ADL function  Description: INTERVENTIONS:  -  Assess patient's ability to carry out ADLs; assess patient's baseline for ADL function and identify physical deficits which impact ability to perform ADLs (bathing, care of mouth/teeth, toileting, grooming, dressing, etc )  - Assess/evaluate cause of self-care deficits   - Assess range of motion  - Assess patient's mobility; develop plan if impaired  - Assess patient's need for assistive devices and provide as appropriate  - Encourage maximum independence but intervene and supervise when necessary  - Involve family in performance of ADLs  - Assess for home care needs following discharge   - Consider OT consult to assist with ADL evaluation and planning for discharge  - Provide patient education as appropriate  Outcome: Progressing  Goal: Maintains/Returns to pre admission functional level  Description: INTERVENTIONS:  - Perform BMAT or MOVE assessment daily    - Set and communicate daily mobility goal to care team and patient/family/caregiver  - Collaborate with rehabilitation services on mobility goals if consulted  - Perform Range of Motion 3 times a day  - Reposition patient every 2 hours    - Dangle patient 3 times a day  - Stand patient 3 times a day  - Ambulate patient 3 times a day  - Out of bed to chair 3 times a day   - Out of bed for meals 3 times a day  - Out of bed for toileting  - Record patient progress and toleration of activity level   Outcome: Progressing     Problem: DISCHARGE PLANNING  Goal: Discharge to home or other facility with appropriate resources  Description: INTERVENTIONS:  - Identify barriers to discharge w/patient and caregiver  - Arrange for needed discharge resources and transportation as appropriate  - Identify discharge learning needs (meds, wound care, etc )  - Arrange for interpretive services to assist at discharge as needed  - Refer to Case Management Department for coordinating discharge planning if the patient needs post-hospital services based on physician/advanced practitioner order or complex needs related to functional status, cognitive ability, or social support system  Outcome: Progressing     Problem: Knowledge Deficit  Goal: Patient/family/caregiver demonstrates understanding of disease process, treatment plan, medications, and discharge instructions  Description: Complete learning assessment and assess knowledge base    Interventions:  - Provide teaching at level of understanding  - Provide teaching via preferred learning methods  Outcome: Progressing     Problem: CARDIOVASCULAR - ADULT  Goal: Maintains optimal cardiac output and hemodynamic stability  Description: INTERVENTIONS:  - Monitor I/O, vital signs and rhythm  - Monitor for S/S and trends of decreased cardiac output  - Administer and titrate ordered vasoactive medications to optimize hemodynamic stability  - Assess quality of pulses, skin color and temperature  - Assess for signs of decreased coronary artery perfusion  - Instruct patient to report change in severity of symptoms  Outcome: Progressing  Goal: Absence of cardiac dysrhythmias or at baseline rhythm  Description: INTERVENTIONS:  - Continuous cardiac monitoring, vital signs, obtain 12 lead EKG if ordered  - Administer antiarrhythmic and heart rate control medications as ordered  - Monitor electrolytes and administer replacement therapy as ordered  Outcome: Progressing     Problem: SKIN/TISSUE INTEGRITY - ADULT  Goal: Skin Integrity remains intact(Skin Breakdown Prevention)  Description: Assess:  -Perform [place] assessment   -Clean and moisturize skin   -Inspect skin when repositioning, toileting, and assisting with ADLS  -Assess under medical devices   -Assess extremities for adequate circulation and sensation     Bed Management:  -Have minimal linens on bed & keep smooth, unwrinkled  -Change linens as needed when moist or perspiring  -Avoid sitting or lying in one position for more than 2 hours while in bed  -Keep HOB at 30 degrees     Toileting:  -Offer bedside commode  -Assess for incontinence   -Use incontinent care products after each incontinent episode     Activity:  -Mobilize patient 3 times a day  -Encourage activity and walks on unit  -Encourage or provide ROM exercises   -Turn and reposition patient every  Hours  -Use appropriate equipment to lift or move patient in bed  -Instruct/ Assist with weight shifting every  when out of bed in chair  -Consider limitation of chair time 2 hour intervals    Skin Care:  -Avoid use of baby powder, tape, friction and shearing, hot water or constrictive clothing  -Relieve pressure over bony prominences   -Do not massage red bony areas    Next Steps:  -Teach patient strategies to minimize risks   -Consider consults to  interdisciplinary teams   Outcome: Progressing  Goal: Incision(s), wounds(s) or drain site(s) healing without S/S of infection  Description: INTERVENTIONS  - Assess and document dressing, incision, wound bed, drain sites and surrounding tissue  - Provide patient and family education  - Perform skin care/dressing changes   Outcome: Progressing     Problem: Potential for Falls  Goal: Patient will remain free of falls  Description: INTERVENTIONS:  - Educate patient/family on patient safety including physical limitations  - Instruct patient to call for assistance with activity   - Consult OT/PT to assist with strengthening/mobility   - Keep Call bell within reach  - Keep bed low and locked with side rails adjusted as appropriate  - Keep care items and personal belongings within reach  - Initiate and maintain comfort rounds  - Make Fall Risk Sign visible to staff  - Offer Toileting every 2 Hours, in advance of need  - Initiate/Maintain alarm  - Obtain necessary fall risk management equipment  - Apply yellow socks and bracelet for high fall risk patients  - Consider moving patient to room near nurses station  Outcome: Progressing     Problem: Prexisting or High Potential for Compromised Skin Integrity  Goal: Skin integrity is maintained or improved  Description: INTERVENTIONS:  - Identify patients at risk for skin breakdown  - Assess and monitor skin integrity  - Assess and monitor nutrition and hydration status  - Monitor labs   - Assess for incontinence   - Turn and reposition patient  - Assist with mobility/ambulation  - Relieve pressure over bony prominences  - Avoid friction and shearing  - Provide appropriate hygiene as needed including keeping skin clean and dry  - Evaluate need for skin moisturizer/barrier cream  - Collaborate with interdisciplinary team   - Patient/family teaching  - Consider wound care consult   Outcome: Progressing     Problem: MOBILITY - ADULT  Goal: Maintain or return to baseline ADL function  Description: INTERVENTIONS:  -  Assess patient's ability to carry out ADLs; assess patient's baseline for ADL function and identify physical deficits which impact ability to perform ADLs (bathing, care of mouth/teeth, toileting, grooming, dressing, etc )  - Assess/evaluate cause of self-care deficits   - Assess range of motion  - Assess patient's mobility; develop plan if impaired  - Assess patient's need for assistive devices and provide as appropriate  - Encourage maximum independence but intervene and supervise when necessary  - Involve family in performance of ADLs  - Assess for home care needs following discharge   - Consider OT consult to assist with ADL evaluation and planning for discharge  - Provide patient education as appropriate  Outcome: Progressing  Goal: Maintains/Returns to pre admission functional level  Description: INTERVENTIONS:  - Perform BMAT or MOVE assessment daily    - Set and communicate daily mobility goal to care team and patient/family/caregiver     - Collaborate with rehabilitation services on mobility goals if consulted  - Perform Range of Motion 3 times a day  - Reposition patient every 2 hours    - Dangle patient 3 times a day  - Stand patient 3 times a day  - Ambulate patient 3 times a day  - Out of bed to chair 3 times a day   - Out of bed for meals 3 times a day  - Out of bed for toileting  - Record patient progress and toleration of activity level   Outcome: Progressing

## 2022-07-07 NOTE — ASSESSMENT & PLAN NOTE
Lab Results   Component Value Date    HGBA1C 7 3 (A) 03/29/2022       Recent Labs     07/06/22 2051 07/07/22  0723 07/07/22  0828 07/07/22  1056   POCGLU 121 47* 119 149*       Blood Sugar Average: Last 72 hrs:  (P) 83   Patient is maintained on Lantus 50 units b i d  And scheduled Humalog 20 units t i d  With associated sliding scale    · with hypoglycemia  · Will continue to hold basal insulin until blood sugars improve  · Sliding scale insulin a c  HS  · Hypoglycemia protocol

## 2022-07-07 NOTE — NURSING NOTE
Agree with previous RN assessment  Patients vitals remain stable @ this time and Call bell is within reach

## 2022-07-07 NOTE — PROGRESS NOTES
[facility]  Progress Note - Delsie Net 1960, 63 y o  female MRN: [id number]  Unit/Bed#: [address] [place] [address] [id number] Encounter: [id number]  Primary Care Provider: CHERELLE Galarza   Date and time admitted to hospital: 7/6/2022  4:59 PM    * 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 heart failure (HCC)  Assessment & Plan  Wt Readings from Last 3 Encounters:   07/07/22 133 kg (292 lb 8 8 oz)   07/06/22 136 kg (300 lb)   07/02/22 128 kg (281 lb 12 oz)     With a history of combined systolic and diastolic CHF, most recent echocardiogram revealing ejection fraction of 30%    · Patient recently admitted and underwent cardiac catheterization on 07/01 with PCI to the mid circumflex artery  · Followed up with Cardiology today, noted 20 lb weight gain since discharge 4 days ago  · Has been compliant with medications, maintained on Demadex 40 mg daily as an outpatient  · BNP elevated at 14,930  · Baseline weight approximately 280  · Weight on arrival 298, most recent 292-6 lb down  · Monitor daily weights, monitor I's and O's  · Will initiate on Bumex drip per Cardiology recommendations        Mixed hyperlipidemia  Assessment & Plan  Lipitor 40    Acquired hypothyroidism  Assessment & Plan  Synthroid 50 mcg daily    Stage 4 chronic kidney disease [facility])  Assessment & Plan  Lab Results   Component Value Date    EGFR 19 07/07/2022    EGFR 16 07/06/2022    EGFR 24 07/02/2022    CREATININE 2 65 (H) 07/07/2022    CREATININE 2 94 (H) 07/06/2022    CREATININE 2 16 (H) 07/02/2022   Creatinine baseline, will monitor as patient will be started on Bumex drip  · Avoid hypotension  · Suspect that elevation in creatinine likely secondary to cardiorenal syndrome in the setting of severe volume overload    Anemia  Assessment & Plan  With history of anemia of chronic disease, hemoglobin currently at baseline    CAD (coronary artery disease)  Assessment & Plan  Status post stenting in 2012, then revised in 2017  · Known chronic total occlusion of RCA  · Most recent cardiac catheterization on 07/01 with PCI to mid circumflex  · Continue is Plavix, Lipitor, aspirin, Imdur, bisoprolol    Type 2 diabetes mellitus with stage 4 chronic kidney disease, with long-term current use of insulin [facility])  Assessment & Plan  Lab Results   Component Value Date    HGBA1C 7 3 (A) 03/29/2022       Recent Labs     07/06/22  2051 07/07/22  0723 07/07/22  0828 07/07/22  1056   POCGLU 121 47* 119 149*       Blood Sugar Average: Last 72 hrs:  (P) 83   Patient is maintained on Lantus 50 units b i d  And scheduled Humalog 20 units t i d  With associated sliding scale  · with hypoglycemia  · Will continue to hold basal insulin until blood sugars improve  · Sliding scale insulin a c  HS  · Hypoglycemia protocol        VTE Pharmacologic Prophylaxis:   Moderate Risk (Score 3-4) - Pharmacological DVT Prophylaxis Ordered: heparin  Patient Centered Rounds: I performed bedside rounds with nursing staff today  Discussions with Specialists or Other Care Team Provider:  None    Education and Discussions with Family / Patient: Patient declined call to   Time Spent for Care: 15 minutes  More than 50% of total time spent on counseling and coordination of care as described above  Current Length of Stay: 1 day(s)  Current Patient Status: Inpatient   Certification Statement: The patient will continue to require additional inpatient hospital stay due to Bumex drip  Discharge Plan: Anticipate discharge in 48-72 hrs to home  Code Status: Level 1 - Full Code    Subjective:   Patient is seen in chair at bedside  She states that her shortness of breath is improved from when she initially presented  She denies any worsening lower extremity swelling, chest pain, nausea, vomiting, diarrhea, constipation  She states that she did have low blood sugar this morning however she is feeling better      Objective:     Vitals:   Temp (24hrs), Av 7 °F (36 5 °C), Min:97 3 °F (36 3 °C), Max:98 °F (36 7 °C)    Temp:  [97 3 °F (36 3 °C)-98 °F (36 7 °C)] 97 3 °F (36 3 °C)  HR:  [44-68] 63  Resp:  [13-20] 20  BP: (120-168)/(60-79) 158/65  SpO2:  [94 %-98 %] 94 %  Body mass index is 44 48 kg/m²  Input and Output Summary (last 24 hours): Intake/Output Summary (Last 24 hours) at 2022 1124  Last data filed at 2022 0837  Gross per 24 hour   Intake 300 ml   Output 4350 ml   Net -4050 ml       Physical Exam:   Physical Exam  Vitals and nursing note reviewed  Constitutional:       General: She is not in acute distress  Appearance: Normal appearance  She is obese  She is not ill-appearing, toxic-appearing or diaphoretic  Eyes:      General: No scleral icterus  Conjunctiva/sclera: Conjunctivae normal    Cardiovascular:      Rate and Rhythm: Normal rate and regular rhythm  Heart sounds: No murmur heard  No friction rub  No gallop  Pulmonary:      Effort: Pulmonary effort is normal  No respiratory distress  Breath sounds: Normal breath sounds  No stridor  No wheezing, rhonchi or rales  Chest:      Chest wall: No tenderness  Abdominal:      General: Abdomen is flat  Bowel sounds are normal  There is no distension  Palpations: Abdomen is soft  Tenderness: There is no abdominal tenderness  Musculoskeletal:      Right lower leg: Edema present  Left lower leg: Edema present  Skin:     General: Skin is warm and dry  Coloration: Skin is not jaundiced or pale  Neurological:      General: No focal deficit present  Mental Status: She is alert and oriented to person, place, and time  Mental status is at baseline            Additional Data:     Labs:  Results from last 7 days   Lab Units 22  0507   WBC Thousand/uL 7 43   HEMOGLOBIN g/dL 7 6*   HEMATOCRIT % 24 6*   PLATELETS Thousands/uL 298   NEUTROS PCT % 62   LYMPHS PCT % 25   MONOS PCT % 9   EOS PCT % 3     Results from last 7 days   Lab Units 07/07/22  0507 07/06/22  1648   SODIUM mmol/L 144 146*   POTASSIUM mmol/L 4 0 4 1   CHLORIDE mmol/L 107 107   CO2 mmol/L 21 24   BUN mg/dL 61* 64*   CREATININE mg/dL 2 65* 2 94*   ANION GAP mmol/L 16* 15*   CALCIUM mg/dL 7 6* 8 0*   ALBUMIN g/dL  --  2 9*   TOTAL BILIRUBIN mg/dL  --  0 20   ALK PHOS U/L  --  113   ALT U/L  --  30   AST U/L  --  11   GLUCOSE RANDOM mg/dL 57* 46*     Results from last 7 days   Lab Units 07/06/22  1648   INR  1 05     Results from last 7 days   Lab Units 07/07/22  1056 07/07/22  0828 07/07/22  0723 07/06/22  2051 07/06/22  1956 07/06/22  1910 07/06/22  1842 07/06/22  1751 07/06/22  1657 07/02/22  1055 07/02/22  0736 07/01/22  2101   POC GLUCOSE mg/dl 149* 119 47* 121 84 55* 74 56* 42* 100 86 229*               Lines/Drains:  Invasive Devices  Report    Peripheral Intravenous Line  Duration           Peripheral IV 07/06/22 Left Antecubital <1 day          Drain  Duration           Suprapubic Catheter Non-latex 16 Fr  273 days                  Telemetry:  Telemetry Orders (From admission, onward)             48 Hour Telemetry Monitoring  (ED Bridging Orders Panel)  Continuous x 48 hours        References:    Telemetry Guidelines   Question:  Reason for 48 Hour Telemetry  Answer:  Acute Decompensated CHF (continuous diuretic infusion or total diuretic dose > 200 mg daily, associated electrolyte derangement, ionotropic drip, history of ventricular arrhythmia, or new EF <35%)                 Telemetry Reviewed: Normal Sinus Rhythm  Indication for Continued Telemetry Use: Acute CHF on >200 mg lasix/day or equivalent dose or with new reduced EF                Imaging: Reviewed radiology reports from this admission including: chest xray    Recent Cultures (last 7 days):         Last 24 Hours Medication List:   Current Facility-Administered Medications   Medication Dose Route Frequency Provider Last Rate    acetaminophen  650 mg Oral Q6H PRN Crystal Doll PA-C      allopurinol  300 mg Oral Daily [place], [place]      amLODIPine  10 mg Oral Daily [place], [place]      aspirin  81 mg Oral Daily [place], [place]      atorvastatin  40 mg Oral Daily [place], [place]      bumetanide  1 mg/hr Intravenous Continuous Florene Inch Bend[place], DO 1 mg/hr (07/07/22 0701)    citalopram  40 mg Oral Daily Alli SETH Sotomayor      clopidogrel  75 mg Oral Daily [facility] [place]      gabapentin  400 mg Oral 4x Daily Alli Sotomayor PA-C      heparin (porcine)  5,000 Units Subcutaneous [place], [place]      HYDROcodone-acetaminophen  1 tablet Oral TID PRN Alli Sotomayor PA-C      insulin lispro  2-12 Units Subcutaneous TID [place], [place]      insulin lispro  2-12 Units Subcutaneous HS Allijp Sotomayor PA-C      isosorbide mononitrate  60 mg Oral Daily [place], [place]      levothyroxine  50 mcg Oral Early Morning [facility] [place]      nitroglycerin  0 4 mg Sublingual Q5 Min PRN Alli [facility] [place]      nystatin   Topical BID [place], [place]      OLANZapine  5 mg Oral HS Allijp Sotomayor PA-C      ondansetron  4 mg Intravenous Q6H PRN Alli Sotomayor PA-C      pantoprazole  40 mg Oral BID AC Alli Sotomayor PA-C      potassium chloride  40 mEq Oral Daily [facility] [place]      senna  1 tablet Oral Daily [place], [place]      tiZANidine  4 mg Oral Q8H PRN Alli Sotomayor PA-C          Today, Patient Was Seen By: Alli Sotomayor PA-C    **Please Note: This note may have been constructed using a voice recognition system  **

## 2022-07-07 NOTE — ED NOTES
Patient given boxed lunch  Sitting at side of bed  Alert and oriented x 4  Breathing equal and unlabored       Orestes Vasquez RN  07/06/22 2026

## 2022-07-07 NOTE — PLAN OF CARE
Problem: PAIN - ADULT  Goal: Verbalizes/displays adequate comfort level or baseline comfort level  Description: Interventions:  - Encourage patient to monitor pain and request assistance  - Assess pain using appropriate pain scale  - Administer analgesics based on type and severity of pain and evaluate response  - Implement non-pharmacological measures as appropriate and evaluate response  - Consider cultural and social influences on pain and pain management  - Notify physician/advanced practitioner if interventions unsuccessful or patient reports new pain  Outcome: Progressing     Problem: INFECTION - ADULT  Goal: Absence or prevention of progression during hospitalization  Description: INTERVENTIONS:  - Assess and monitor for signs and symptoms of infection  - Monitor lab/diagnostic results  - Monitor all insertion sites, i e  indwelling lines, tubes, and drains  - Monitor endotracheal if appropriate and nasal secretions for changes in amount and color  - [place] appropriate cooling/warming therapies per order  - Administer medications as ordered  - Instruct and encourage patient and family to use good hand hygiene technique  - Identify and instruct in appropriate isolation precautions for identified infection/condition  Outcome: Progressing     Problem: SAFETY ADULT  Goal: Patient will remain free of falls  Description: INTERVENTIONS:  - Educate patient/family on patient safety including physical limitations  - Instruct patient to call for assistance with activity   - Consult OT/PT to assist with strengthening/mobility   - Keep Call bell within reach  - Keep bed low and locked with side rails adjusted as appropriate  - Keep care items and personal belongings within reach  - Initiate and maintain comfort rounds  - Make Fall Risk Sign visible to staff  - Offer Toileting every  Hours, in advance of need  - Initiate/Maintain alarm  - Obtain necessary fall risk management equipment:   - Apply yellow socks and bracelet for high fall risk patients  - Consider moving patient to room near nurses station  Outcome: Progressing  Goal: Maintain or return to baseline ADL function  Description: INTERVENTIONS:  -  Assess patient's ability to carry out ADLs; assess patient's baseline for ADL function and identify physical deficits which impact ability to perform ADLs (bathing, care of mouth/teeth, toileting, grooming, dressing, etc )  - Assess/evaluate cause of self-care deficits   - Assess range of motion  - Assess patient's mobility; develop plan if impaired  - Assess patient's need for assistive devices and provide as appropriate  - Encourage maximum independence but intervene and supervise when necessary  - Involve family in performance of ADLs  - Assess for home care needs following discharge   - Consider OT consult to assist with ADL evaluation and planning for discharge  - Provide patient education as appropriate  Outcome: Progressing  Goal: Maintains/Returns to pre admission functional level  Description: INTERVENTIONS:  - Perform BMAT or MOVE assessment daily    - Set and communicate daily mobility goal to care team and patient/family/caregiver  - Collaborate with rehabilitation services on mobility goals if consulted  - Perform Range of Motion  times a day  - Reposition patient every  hours    - Dangle patient  times a day  - Stand patient  times a day  - Ambulate patient  times a day  - Out of bed to chair  times a day   - Out of bed for meals  times a day  - Out of bed for toileting  - Record patient progress and toleration of activity level   Outcome: Progressing     Problem: DISCHARGE PLANNING  Goal: Discharge to home or other facility with appropriate resources  Description: INTERVENTIONS:  - Identify barriers to discharge w/patient and caregiver  - Arrange for needed discharge resources and transportation as appropriate  - Identify discharge learning needs (meds, wound care, etc )  - Arrange for interpretive services to assist at discharge as needed  - Refer to Case Management Department for coordinating discharge planning if the patient needs post-hospital services based on physician/advanced practitioner order or complex needs related to functional status, cognitive ability, or social support system  Outcome: Progressing     Problem: Knowledge Deficit  Goal: Patient/family/caregiver demonstrates understanding of disease process, treatment plan, medications, and discharge instructions  Description: Complete learning assessment and assess knowledge base    Interventions:  - Provide teaching at level of understanding  - Provide teaching via preferred learning methods  Outcome: Progressing     Problem: CARDIOVASCULAR - ADULT  Goal: Maintains optimal cardiac output and hemodynamic stability  Description: INTERVENTIONS:  - Monitor I/O, vital signs and rhythm  - Monitor for S/S and trends of decreased cardiac output  - Administer and titrate ordered vasoactive medications to optimize hemodynamic stability  - Assess quality of pulses, skin color and temperature  - Assess for signs of decreased coronary artery perfusion  - Instruct patient to report change in severity of symptoms  Outcome: Progressing  Goal: Absence of cardiac dysrhythmias or at baseline rhythm  Description: INTERVENTIONS:  - Continuous cardiac monitoring, vital signs, obtain 12 lead EKG if ordered  - Administer antiarrhythmic and heart rate control medications as ordered  - Monitor electrolytes and administer replacement therapy as ordered  Outcome: Progressing     Problem: SKIN/TISSUE INTEGRITY - ADULT  Goal: Skin Integrity remains intact(Skin Breakdown Prevention)  Description: Assess:  -Perform [place] assessment every   -Clean and moisturize skin every   -Inspect skin when repositioning, toileting, and assisting with ADLS  -Assess under medical devices such as  every   -Assess extremities for adequate circulation and sensation     Bed Management:  -Have minimal linens on bed & keep smooth, unwrinkled  -Change linens as needed when moist or perspiring  -Avoid sitting or lying in one position for more than  hours while in bed  -Keep HOB at degrees     Toileting:  -Offer bedside commode  -Assess for incontinence every   -Use incontinent care products after each incontinent episode such as     Activity:  -Mobilize patient  times a day  -Encourage activity and walks on unit  -Encourage or provide ROM exercises   -Turn and reposition patient every  Hours  -Use appropriate equipment to lift or move patient in bed  -Instruct/ Assist with weight shifting every  when out of bed in chair  -Consider limitation of chair time  hour intervals    Skin Care:  -Avoid use of baby powder, tape, friction and shearing, hot water or constrictive clothing  -Relieve pressure over bony prominences using   -Do not massage red bony areas    Next Steps:  -Teach patient strategies to minimize risks such as    -Consider consults to  interdisciplinary teams such as   Outcome: Progressing  Goal: Incision(s), wounds(s) or drain site(s) healing without S/S of infection  Description: INTERVENTIONS  - Assess and document dressing, incision, wound bed, drain sites and surrounding tissue  - Provide patient and family education  - Perform skin care/dressing changes everry  Outcome: Progressing

## 2022-07-07 NOTE — ASSESSMENT & PLAN NOTE
Lab Results   Component Value Date    EGFR 19 07/07/2022    EGFR 16 07/06/2022    EGFR 24 07/02/2022    CREATININE 2 65 (H) 07/07/2022    CREATININE 2 94 (H) 07/06/2022    CREATININE 2 16 (H) 07/02/2022   Creatinine baseline, will monitor as patient will be started on Bumex drip  · Avoid hypotension  · Suspect that elevation in creatinine likely secondary to cardiorenal syndrome in the setting of severe volume overload

## 2022-07-07 NOTE — UTILIZATION REVIEW
Initial Clinical Review    Admission: Date/Time/Statement:   Admission Orders (From admission, onward)     Ordered        07/06/22 1742  INPATIENT ADMISSION  Once                      Orders Placed This Encounter   Procedures    INPATIENT ADMISSION     Standing Status:   Standing     Number of Occurrences:   1     Order Specific Question:   Level of Care     Answer:   Level 2 Stepdown / HOT [14]     Order Specific Question:   Estimated length of stay     Answer:   More than 2 Midnights     Order Specific Question:   Certification     Answer:   I certify that inpatient services are medically necessary for this patient for a duration of greater than two midnights  See H&P and MD Progress Notes for additional information about the patient's course of treatment  ED Arrival Information     Expected   7/6/2022     Arrival   7/6/2022 16:26    Acuity   Emergent            Means of arrival   Wheelchair    Escorted by   Self    Service   Hospitalist    Admission type   Emergency            Arrival complaint   heart problem           Chief Complaint   Patient presents with    Shortness of Breath     Patient sent over from cardiologist, reports 25lb weight gain in four days  History of CHF  Reports SOB at rest, swelling increased in legs  Patient takes torsemide daily  Patient reports mild chest pain and sob at rest         Initial Presentation: 63 y o  female to the ED from home with complaints of shortness of breath, 25 lb weight gain in four days  H/O CAD with multiple PCIs, CKD, ischemic cardiomyopathy, EUN, pulmonary hypertension, PCOS, DM  Admitted to CRITICAL CARE step down for acute on chronic CHF  Most recent EF 30%  Has been compliant with medication  PRo BNP elevated  Started on Bumex drip  Cardiology consult  Lungs with rales  +3 bilateral lower extremity pitting edema  Strict I/Os  Continue with plavix, lipitor, asa, imdur, bisoprolol  Date: 7/7   Day 2: Continue with Bumex drip    Shortness of breath improving  LUngs clear  Cardiology consult:  LVEF 38%  Decreased breath sounds  Continue amlodipine, aspirin, high-intensity statin, Plavix, isosorbide  Admitted from 6/21/22-7/2 for NSTEMI receiving cardiac stent  Pain improved since getting diuretic  Has lost 6 lbs since admit       ED Triage Vitals [07/06/22 1637]   Temperature Pulse Respirations Blood Pressure SpO2   97 9 °F (36 6 °C) 67 20 161/67 95 %      Temp Source Heart Rate Source Patient Position - Orthostatic VS BP Location FiO2 (%)   Oral Monitor Sitting Right arm --      Pain Score       4          Wt Readings from Last 1 Encounters:   07/07/22 133 kg (292 lb 8 8 oz)     Additional Vital Signs:    Temp Pulse Resp BP MAP (mmHg) SpO2 O2 Device Patient Position - Orthostatic VS   07/07/22 07:32:18 97 3 °F (36 3 °C) Abnormal  63 20 158/65 96 94 % -- --   07/07/22 06:30:20 98 °F (36 7 °C) 68 16 163/69 100 96 % -- --   07/06/22 2300 -- -- -- -- -- -- None (Room air) --   07/06/22 22:19:34 97 5 °F (36 4 °C) 68 20 168/79 109 97 % -- --   07/06/22 2015 -- 66 17 167/64 92 94 % None (Room air) --   07/06/22 1922 -- 51 Abnormal  16 142/61 -- 98 % None (Room air) Lying   07/06/22 1915 -- 44 Abnormal  16 142/61 88 98 % None (Room air) --   07/06/22 1815 -- 50 Abnormal  13 142/60 87 96 % None (Room air) Lying   07/06/22 1807 -- 58 -- 142/60 -- 96 % None (Room air) Lying   07/06/22 1637 97 9 °F (36 6 °C) 67 20 161/67 -- 95 % None (Room air) Sitting   Intake/Output Summary (Last 24 hours) at 7/7/2022 0903  Last data filed at 7/7/2022 3485  Gross per 24 hour   Intake 300 ml   Output 4350 ml   Net -4050 ml          Pertinent Labs/Diagnostic Test Results:   7/6 EKG:  Normal sinus rhythm  Left bundle branch block  Abnormal ECG  When compared with ECG of 22-JUN-2022 04:34,  QRS axis Shifted left  Nonspecific T wave abnormality has replaced inverted T waves in Inferior leads  T wave inversion no longer evident in Anterior leads  XR chest 1 view portable   Final Result by Altagracia Looney MD (07/07 7286)      Unchanged enlargement of the cardiomediastinal silhouette  Possible small bilateral pleural effusions  Decreased right basilar opacity                    Workstation performed: [id number]           Results from last 7 days   Lab Units 07/06/22 2105   SARS-COV-2  Negative     Results from last 7 days   Lab Units 07/07/22  0507 07/06/22  1648   WBC Thousand/uL 7 43 10 99*   HEMOGLOBIN g/dL 7 6* 8 5*   HEMATOCRIT % 24 6* 27 1*   PLATELETS Thousands/uL 298 351   NEUTROS ABS Thousands/µL 4 65 7 83*         Results from last 7 days   Lab Units 07/07/22  0507 07/06/22  1648 07/02/22  0510 07/01/22  0942   SODIUM mmol/L 144 146* 142 143   POTASSIUM mmol/L 4 0 4 1 4 0 4 5   CHLORIDE mmol/L 107 107 107 108   CO2 mmol/L 21 24 25 24   ANION GAP mmol/L 16* 15* 10 11   BUN mg/dL 61* 64* 48* 51*   CREATININE mg/dL 2 65* 2 94* 2 16* 2 04*   EGFR ml/min/1 73sq m 19 16 24 26   CALCIUM mg/dL 7 6* 8 0* 7 9* 8 4     Results from last 7 days   Lab Units 07/06/22  1648   AST U/L 11   ALT U/L 30   ALK PHOS U/L 113   TOTAL PROTEIN g/dL 6 8   ALBUMIN g/dL 2 9*   TOTAL BILIRUBIN mg/dL 0 20     Results from last 7 days   Lab Units 07/07/22  0828 07/07/22  0723 07/06/22  2051 07/06/22  1956 07/06/22  1910 07/06/22  1842 07/06/22  1751 07/06/22  1657 07/02/22  1055 07/02/22  0736 07/01/22  2101 07/01/22  1537   POC GLUCOSE mg/dl 119 47* 121 84 55* 74 56* 42* 100 86 229* 87     Results from last 7 days   Lab Units 07/07/22  0507 07/06/22  1648 07/02/22  0510 07/01/22  0942   GLUCOSE RANDOM mg/dL 57* 46* 116 99       Results from last 7 days   Lab Units 07/06/22  2105 07/06/22  1806 07/06/22  1648   HS TNI 0HR ng/L  --   --  129*   HS TNI 2HR ng/L  --  115*  --    HSTNI D2 ng/L  --  -14  --    HS TNI 4HR ng/L 120*  --   --    HSTNI D4 ng/L -9  --   --          Results from last 7 days   Lab Units 07/06/22  1648   PROTIME seconds 13 4   INR  1 05   PTT seconds 30       Results from last 7 days Lab Units 07/06/22  1648   NT-PRO BNP pg/mL 14,930*       Results from last 7 days   Lab Units 07/06/22  2105   INFLUENZA A PCR  Negative   INFLUENZA B PCR  Negative   RSV PCR  Negative       ED Treatment:   Medication Administration from 07/06/2022 1626 to 07/06/2022 2213       Date/Time Order Dose Route Action     07/06/2022 1709 dextrose 50 % IV solution 25 mL 25 mL Intravenous Given     07/06/2022 1759 bumetanide (BUMEX) 12 5 mg infusion 50 mL 1 mg/hr Intravenous New Bag     07/06/2022 1758 dextrose 50 % IV solution 25 mL 25 mL Intravenous Given     07/06/2022 2110 gabapentin (NEURONTIN) capsule 400 mg 400 mg Oral Given     07/06/2022 2114 nystatin (MYCOSTATIN) powder   Topical Given     07/06/2022 2110 pantoprazole (PROTONIX) EC tablet 40 mg 40 mg Oral Given     07/06/2022 2112 heparin (porcine) subcutaneous injection 5,000 Units 5,000 Units Subcutaneous Given     07/06/2022 2013 dextrose 50 % IV solution 25 mL 25 mL Intravenous Given        Past Medical History:   Diagnosis Date    Abnormal liver function     Anemia     Anxiety     Arthritis     Chronic kidney disease     stage 3    Chronic narcotic dependence (HCC)     Chronic pain disorder     lower back, hands , neck and knees    Coronary artery disease     Depression     Diabetes mellitus (HCC)     Elevated troponin 2/11/2022    GERD (gastroesophageal reflux disease)     no meds at present    Heart murmur     murmur    Hyperlipidemia     Hypertension     Neurogenic bladder     Open toe wound 12/2020    right big toe open calus but is dry at present    Renal disorder     Shortness of breath     exertion    Sleep apnea     doesn't use cpap    Suprapubic catheter ([facility] )        Admitting Diagnosis: CHF (congestive heart failure) ([facility] [address] ) [I50 9]  Chest pain [R07 9]  Dyspnea [R06 00]  Hypoglycemia [E16 2]  CKD (chronic kidney disease) [N18 9]  Elevated troponin [R77 8]  Age/Sex: 63 y o  female  Admission Orders:  SCDS  I/O  1500 ml fluid restriciton  Scheduled Medications:  allopurinol, 300 mg, Oral, Daily  amLODIPine, 10 mg, Oral, Daily  aspirin, 81 mg, Oral, Daily  atorvastatin, 40 mg, Oral, Daily  citalopram, 40 mg, Oral, Daily  clopidogrel, 75 mg, Oral, Daily  gabapentin, 400 mg, Oral, 4x Daily  heparin (porcine), 5,000 Units, Subcutaneous, Q8H LIAM  insulin lispro, 2-12 Units, Subcutaneous, TID AC  insulin lispro, 2-12 Units, Subcutaneous, HS  isosorbide mononitrate, 60 mg, Oral, Daily  levothyroxine, 50 mcg, Oral, Early Morning  nystatin, , Topical, BID  OLANZapine, 5 mg, Oral, HS  pantoprazole, 40 mg, Oral, BID AC  potassium chloride, 40 mEq, Oral, Daily  senna, 1 tablet, Oral, Daily      Continuous IV Infusions:  bumetanide, 1 mg/hr, Intravenous, Continuous      PRN Meds:  acetaminophen, 650 mg, Oral, Q6H PRN  HYDROcodone-acetaminophen, 1 tablet, Oral, TID PRN  nitroglycerin, 0 4 mg, Sublingual, Q5 Min PRN  ondansetron, 4 mg, Intravenous, Q6H PRN  tiZANidine, 4 mg, Oral, Q8H PRN        IP CONSULT TO NUTRITION SERVICES  IP CONSULT TO CARDIOLOGY    Network Utilization Review Department  ATTENTION: Please call with any questions or concerns to [phone] and carefully listen to the prompts so that you are directed to the right person  All voicemails are confidential   [facility] all requests for admission clinical reviews, approved or denied determinations and any other requests to dedicated fax number below belonging to the campus where the patient is receiving treatment   List of dedicated fax numbers for the Facilities:  [address] DENIALS (Administrative/Medical Necessity) [phone]   [address]  (Maternity/NICU/Pediatrics) 261 [facility],[address] Floor [facility] 40 [address] [facility] [address] [place] [address] [address] [address] [place] [address] [address] Godinez [address] [phone]

## 2022-07-07 NOTE — CONSULTS
Cardiology Consultation  Rosy Councilman, MD Jerrell Brooklyn, MD Kenna Donning, DO, MD Lydia Gutierrez DO, Natali Greer DO, [facility] [place]  ----------------------------------------------------------------  [address]  [address], [address]    Latoya Alva 63 y o  female MRN: [id number]  Unit/Bed#: Metsa 68 2 [address] 87 206-01 Encounter: [id number]      Reason for Consultation: Heart failure      ASSESSMENT:   · Acute on chronic combined systolic and diastolic heart failure  · LVEF 38%, moderate LVH, mild LA dilated, AV sclerosis, trace AR, mild MR, MV sclerosis, mild TR, June 2022  · CAD with multiple PCIs  · NSTEMI s/p JANET-mLCx w/ small 95% pRCA and diffuse moderate disease of LAD, July 2022  · s/p -pRCA, August 2021  · s/p JANET-mLAD, JANET-D1 and JANET-LCx, December 2012  · Recent Acute renal failure on chronic kidney disease  · Recent acute enteritis  · Ischemic cardiomyopathy with moderate to markedly reduced LV systolic function  · Dyslipidemia  · Morbid obesity  · CKD stage 4  · Anemia of chronic kidney disease  · Type 2 diabetes mellitus  · PCOS  · Fibromyalgia  · Neurogenic bladder with suprapubic catheter in place  · Obstructive sleep apnea, uncorrected  · Syncope with orthostatic hypotension  · Mild pulmonary hypertension    PLAN:  Patient is clinically in acute heart failure likely from dietary indiscretion  She has gained 20 lb, but is now down 6 lb since admission  Bumex drip  Strict I/Os and daily weights  Keep potassium greater than 4 and magnesium greater than 2  The patient has had recent echocardiogram; would not repeat at this time with flat troponins and improved ECG  Continue amlodipine, aspirin, high-intensity statin, Plavix, isosorbide  Supportive care    Signed: Mary Ann Plaza DO, [facility] [place], FACP      History of Present Illness:  Latoya Alva is a 63 y o  female with CAD, chronic combined systolic and diastolic heart failure, CKD, dyslipidemia, type 2 diabetes mellitus, PCOS and EUN presents with progressive lower extremity swelling and weight gain over the course of 4 days  The patient was recently hospitalized for acute kidney injury on chronic kidney disease  She received drug-eluting stent to the circumflex  Following discharge, she gained 20 lb and was seen in the office by Dr Niya Zhu her primary cardiologist on July 6, 2022  She was then recommended for admission to [facility]  She was placed on Bumex drip and has lost 6 lb since admission  The patient admits to some dietary indiscretion in the past several days since discharge  On a regular basis, she will eat foods including a ham sandwich which cheese, cheetos/cheese puffs and butter popcorn  The patient admits to some difficulty with breathing that results in some heaviness with inspiration  This is nothing like her prior chest discomfort  This is improved since receiving diuretic  Admits to improved lower extremity swelling today  Denies orthopnea or paroxysmal nocturnal dyspnea  Denies lightheadedness, dizziness or palpitations  Review of Systems:  Review of Systems   Constitutional: Negative for decreased appetite, fever, weight gain and weight loss  HENT: Negative for congestion and sore throat  Eyes: Negative for visual disturbance  Cardiovascular: Positive for dyspnea on exertion and leg swelling  Negative for chest pain, near-syncope and palpitations  Respiratory: Positive for shortness of breath  Negative for cough  Hematologic/Lymphatic: Negative for bleeding problem  Skin: Negative for rash  Musculoskeletal: Negative for myalgias and neck pain  Gastrointestinal: Negative for abdominal pain and nausea  Neurological: Negative for light-headedness and weakness  Psychiatric/Behavioral: Negative for depression           Past Medical History:   Diagnosis Date    Abnormal liver function     Anemia     Anxiety     Arthritis     Chronic kidney disease     stage 3    Chronic narcotic dependence (HCC)     Chronic pain disorder     lower back, hands , neck and knees    Coronary artery disease     Depression     Diabetes mellitus ([facility] [address] )     Elevated troponin 2/11/2022    GERD (gastroesophageal reflux disease)     no meds at present    Heart murmur     murmur    Hyperlipidemia     Hypertension     Neurogenic bladder     Open toe wound 12/2020    right big toe open calus but is dry at present    Renal disorder     Shortness of breath     exertion    Sleep apnea     doesn't use cpap    Suprapubic catheter [facility])        Past Surgical History:   Procedure Laterality Date    AMPUTATION      ANGIOPLASTY  2017    5    BREAST EXCISIONAL BIOPSY Left     BREAST SURGERY      CARDIAC CATHETERIZATION      CARDIAC CATHETERIZATION N/A 7/1/2022    Procedure: Cardiac catheterization;  Surgeon: Mi Terry MD;  Location: [facility]; Service: Cardiology    CARDIAC CATHETERIZATION N/A 7/1/2022    Procedure: Cardiac pci;  Surgeon: Mi Terry MD;  [facility];   Service: Cardiology    CARPAL TUNNEL RELEASE Bilateral     CERVICAL FUSION      HYSTERECTOMY  2008    IR SUPRAPUBIC CATHETER PLACEMENT  6/15/2021    KNEE SURGERY      OOPHORECTOMY  2008    VT EXC SKIN BENIG 3 1-4 CM REMAINDR BODY N/A 12/21/2020    Procedure: EXCISION SEBACEOUS CYST X 5 SCALP;  Surgeon: Howie Pulliam MD;  Location: [address] [place] [address];  Service: General    TOE AMPUTATION Left     TRIGGER FINGER RELEASE Right     4th Finger       Social History     Socioeconomic History    Marital status:      Spouse name: None    Number of children: None    Years of education: None    Highest education level: None   Occupational History    None   Tobacco Use    Smoking status: Former Smoker     Packs/day: 1 00     Years: 35 00     Pack years: 35 00     Types: Cigarettes     Quit date: 2012     Years since quitting: 10 5    Smokeless tobacco: Never Used   Vaping Use    Vaping Use: Never used   Substance and Sexual Activity    Alcohol use: Not Currently    Drug use: Never    Sexual activity: Not Currently   Other Topics Concern    None   Social History Narrative    None     Social Determinants of Health     Financial Resource Strain: Low Risk     Difficulty of Paying Living Expenses: Not hard at all   Food Insecurity: No Food Insecurity    Worried About Running Out of Food in the Last Year: Never true    Henry of Food in the Last Year: Never true   Transportation Needs: No Transportation Needs    Lack of Transportation (Medical): No    Lack of Transportation (Non-Medical): No   Physical Activity: Not on file   Stress: Not on file   Social Connections: Not on file   Intimate Partner Violence: Not on file   Housing Stability: Low Risk     Unable to Pay for Housing in the Last Year: No    Number of Places Lived in the Last Year: 1    Unstable Housing in the Last Year: No       Family History   Problem Relation Age of Onset    Stroke Father     Heart disease Father     No Known Problems Mother     No Known Problems Sister     No Known Problems Daughter     No Known Problems Maternal Grandmother     No Known Problems Maternal Grandfather     No Known Problems Paternal Grandmother     No Known Problems Paternal Grandfather     No Known Problems Maternal Aunt     No Known Problems Maternal Aunt     No Known Problems Maternal Aunt     No Known Problems Maternal Aunt     No Known Problems Maternal Aunt     No Known Problems Maternal Aunt     No Known Problems Paternal Aunt        Allergies   Allergen Reactions    Codeine      Other reaction(s): Nausea and Vomiting    Latex Itching       No current facility-administered medications on file prior to encounter       Current Outpatient Medications on File Prior to Encounter   Medication Sig    allopurinol (ZYLOPRIM) 300 mg tablet Take 1 tablet (300 mg total) by mouth daily    amLODIPine (NORVASC) 10 mg tablet Take 1 tablet (10 mg total) by mouth daily    aspirin (ECOTRIN LOW STRENGTH) 81 mg EC tablet Take 1 tablet (81 mg total) by mouth daily    atorvastatin (LIPITOR) 40 mg tablet Take 1 tablet (40 mg total) by mouth daily    bisoprolol (ZEBETA) 5 mg tablet Take 0 5 tablets (2 5 mg total) by mouth daily    citalopram (CeleXA) 40 mg tablet Take 1 tablet (40 mg total) by mouth daily    clopidogrel (PLAVIX) 75 mg tablet Take 1 tablet (75 mg total) by mouth daily    Dulaglutide (Trulicity) 1 5 LW/9 8OM SOPN Inject 0 5 mL (1 5 mg total) under the skin once a week    ferrous sulfate 325 (65 Fe) mg tablet Take 1 tablet (325 mg total) by mouth every other day    gabapentin (NEURONTIN) 800 mg tablet Take 1 tablet (800 mg total) by mouth 4 (four) times a day    HYDROcodone-acetaminophen (NORCO) 5-325 mg per tablet Take 1 tablet by mouth 3 (three) times a day as needed for pain For ongoing pain Max Daily Amount: 3 tablets    insulin glargine (Lantus SoloStar) 100 units/mL injection pen Inject 50 Units under the skin every 12 (twelve) hours    insulin lispro (HumaLOG) 100 units/mL injection pen INJECT 20 UNITS UNDER THE SKIN 3 (THREE) TIMES A DAY WITH MEALS    Insulin Pen Needle (BD Pen Needle Micro U/F) 32G X 6 MM MISC Use 5 (five) times a day    Insulin Syringe-Needle U-100 (BD Veo Insulin Syringe U/F) 31G X 15/64" 0 5 ML MISC Inject under the skin 3 (three) times a day    isosorbide mononitrate (IMDUR) 60 mg 24 hr tablet TAKE 1 TABLET BY MOUTH EVERY DAY    Lancets (OneTouch Delica Plus [id number]) MISC USE TO TEST 3 TIMES DAILY    levothyroxine 50 mcg tablet TAKE 1 TABLET BY MOUTH EVERY DAY    loperamide (IMODIUM) 2 mg capsule Take 1 capsule (2 mg total) by mouth 4 (four) times a day as needed for diarrhea    naloxone (NARCAN) 4 mg/0 1 mL nasal spray Administer 1 spray into a nostril   If breathing does not return to normal or if breathing difficulty resumes after 2-3 minutes, give another dose in the other nostril using a new spray   nitroglycerin (NITROSTAT) 0 4 mg SL tablet Place 1 tablet (0 4 mg total) under the tongue every 5 (five) minutes as needed for chest pain    nystatin (MYCOSTATIN) powder Apply topically 2 (two) times a day    OLANZapine (ZyPREXA) 5 mg tablet Take 1 tablet (5 mg total) by mouth daily at bedtime    ondansetron (ZOFRAN-ODT) 4 mg disintegrating tablet Take 1 tablet (4 mg total) by mouth every 8 (eight) hours as needed for nausea or vomiting    OneTouch Ultra test strip USE 1 EACH DAILY USE AS INSTRUCTED    pantoprazole (PROTONIX) 40 mg tablet TAKE 1 TABLET BY MOUTH TWICE A DAY    tiZANidine (ZANAFLEX) 4 mg tablet TAKE 1 TABLET (4 MG TOTAL) BY MOUTH EVERY 8 (EIGHT) HOURS AS NEEDED FOR MUSCLE SPASMS    torsemide (DEMADEX) 20 mg tablet Take 2 tablets (40 mg total) by mouth daily    [DISCONTINUED] oxygen gas Inhale 2 L/min continuous   Indications: Respiratory Failure        Current Facility-Administered Medications   Medication Dose Route Frequency Provider Last Rate    acetaminophen  650 mg Oral Q6H PRN Anastacia Alex PA-C      allopurinol  300 mg Oral Daily Anastacia Alex PA-C      amLODIPine  10 mg Oral Daily Nanci Atwood      aspirin  81 mg Oral Daily Anastacia Alex [place]      atorvastatin  40 mg Oral Daily Anastacia Alex [place]      bumetanide  1 mg/hr Intravenous Continuous Nuris Sharps Bendock, DO 1 mg/hr (07/07/22 0701)    citalopram  40 mg Oral Daily Anastacia Alex PA-C      clopidogrel  75 mg Oral Daily Nanci Atwood      gabapentin  400 mg Oral 4x Daily Anastacia Alex PA-C      heparin (porcine)  5,000 Units Subcutaneous [facility] Nanci Atwood      HYDROcodone-acetaminophen  1 tablet Oral TID PRN Anastacia Alex PA-C      insulin lispro  2-12 Units Subcutaneous TID [place], [place]      insulin lispro  2-12 Units Subcutaneous HS Anastacia Alex PA-C      isosorbide mononitrate  60 mg Oral Daily Anastacia Alex PA-C      levothyroxine  50 mcg Oral Early Morning Nanci Forte      nitroglycerin  0 4 mg Sublingual Q5 Min PRN Nanci Forte      nystatin   Topical BID Nanci Forte      OLANZapine  5 mg Oral HS Modesta Crawley PA-C      ondansetron  4 mg Intravenous Q6H PRN Modesta Crawley PA-C      pantoprazole  40 mg Oral BID AC Nanci Forte      potassium chloride  40 mEq Oral Daily Nanci Forte      senna  1 tablet Oral Daily Nanci Forte      tiZANidine  4 mg Oral Q8H PRN Modesta Crawley PA-C         bumetanide, 1 mg/hr, Last Rate: 1 mg/hr (07/07/22 0701)        Vitals:    07/06/22 2219 07/07/22 0600 07/07/22 0630 07/07/22 0732   BP: 168/79  163/69 158/65   BP Location:       Pulse: 68  68 63   Resp: 20  16 20   Temp: 97 5 °F (36 4 °C)  98 °F (36 7 °C) (!) 97 3 °F (36 3 °C)   TempSrc:       SpO2: 97%  96% 94%   Weight:  133 kg (292 lb 8 8 oz)         I/O last 3 completed shifts:  In: -   Out: 3600 [Urine:3600]      Intake/Output Summary (Last 24 hours) at 7/7/2022 0903  Last data filed at 7/7/2022 0222  Gross per 24 hour   Intake 300 ml   Output 4350 ml   Net -4050 ml       Weight change:     PHYSICAL EXAMINATION:  Gen: Awake, Alert, NAD  Head/eyes: AT/NC, pupils equal and round, Anicteric  ENT: mmm  Neck: Supple, No elevated JVP, trachea midline  Resp:  Decreased breath sounds otherwise clear  CV: RRR +S1, S2, II/VI SM @ LLSB  Abd: Soft, obese, NT/ND + BS  Ext: +1 pitting LE edema bilaterally  Neuro:  Follows commands, moves all extermities  Psych: Appropriate affect, normal mood, pleasant attitude, non-combative  Skin: warm; no rash, erythema or venous stasis changes on exposed skin    Lab Results:  Results from last 7 days   Lab Units 07/07/22  0507   WBC Thousand/uL 7 43   HEMOGLOBIN g/dL 7 6*   HEMATOCRIT % 24 6*   PLATELETS Thousands/uL 298     Results from last 7 days   Lab Units 07/07/22  0507 07/06/22  1648   POTASSIUM mmol/L 4 0 4 1   CHLORIDE mmol/L 107 107   CO2 mmol/L 21 24   BUN mg/dL 61* 64*   CREATININE mg/dL 2 65* 2 94*   CALCIUM mg/dL 7 6* 8 0*   ALK PHOS U/L  --  113   ALT U/L  --  30   AST U/L  --  11     No results found for: TROPONINT      Results from last 7 days   Lab Units 22  2105 22  1806 22  1648   HS TNI 0HR ng/L  --   --  129*   HS TNI 2HR ng/L  --  115*  --    HS TNI 4HR ng/L 120*  --   --          Results from last 7 days   Lab Units 22  1648   INR  1 05           Results for orders placed during the hospital encounter of 21    Echo complete with contrast if indicated    Narrative  [employer]  [address] [facility], [address] [facility]    Transthoracic Echocardiogram  2D, M-mode, Doppler, and Color Doppler    Study date:  24-Aug-2021    Patient: Chula Jolley  MR number: [id number]  Account number: [de-identified]  : 1960  Age: 64 years  Gender: Female  Status: Inpatient  [facility]: [facility]  Height: 68 in  Weight: 312 4 lb  BP: 149/ 79 mmHg    Indications: Heart failure    Diagnoses: I50 9 - Heart failure, unspecified    Sonographer:  CASANDRA Calderon  Interpreting Physician:  NICOLE Christine  Primary Physician:  Poncho Vásquez MD  Referring Physician:  CHERELLE Richardson  Group:  [facility]    SUMMARY    LEFT VENTRICLE:  Systolic function was mildly reduced by visual assessment  Ejection fraction was estimated to be 45 %  There were regional wall motion abnormalities that suggest coronary artery disease  There was severe hypokinesis of the basal-mid anteroseptal and basal-mid inferoseptal wall(s)  There was moderate hypokinesis of the basal-mid inferior wall(s)  Wall thickness was mildly increased  There was mild concentric hypertrophy  Features were consistent with grade 2 diastolic dysfunction  Doppler parameters were consistent with high ventricular filling pressure  E/E' was > 19    VENTRICULAR SEPTUM:  There was dyssynergic motion  These changes are consistent with LBBB      MITRAL VALVE:  There was mild "progressive" mitral stenosis  Mean gradient of 5 mmHg at 73 bpm  MVA by Doppler continuity equation is 2 15 cm2  TRICUSPID VALVE:  There was mild regurgitation  PULMONIC VALVE:  There was trace regurgitation  PERICARDIUM:  A trace pericardial effusion was identified  HISTORY: PRIOR HISTORY: CAD, CKD Risk factors: hypertension and diabetes  PROCEDURE: The study was performed in the [place] [address]  This was a routine study  The transthoracic approach was used  The study included complete 2D imaging, M-mode, complete spectral Doppler, and color Doppler  The heart rate was 76  bpm, at the start of the study  Images were obtained from the parasternal, apical, subcostal, and suprasternal notch acoustic windows  Image quality was adequate  LEFT VENTRICLE: Size was normal  Systolic function was mildly reduced by visual assessment  Ejection fraction was estimated to be 45 %  There were regional wall motion abnormalities that suggest coronary artery disease  There was severe hypokinesis of the basal-mid anteroseptal and basal-mid inferoseptal wall(s)  There was moderate hypokinesis of the basal-mid inferior wall(s)  Wall thickness was mildly increased  There was mild concentric hypertrophy  DOPPLER: Features were consistent with grade 2 diastolic dysfunction  Doppler parameters were consistent with high ventricular filling pressure  E/E' was > 19    VENTRICULAR SEPTUM: There was dyssynergic motion  These changes are consistent with LBBB  RIGHT VENTRICLE: The size was normal  Systolic function was normal  Wall thickness was normal     LEFT ATRIUM: Size was within the limits of normal when indexed for body surface area  LA volume index 31 ml/m2  RIGHT ATRIUM: Size was normal     MITRAL VALVE: Valve structure was normal  There was mild thickening  There was normal leaflet separation  There was mildly restricted mobility of the anterior leaflet   DOPPLER: The transmitral velocity was within the normal range  There  was mild "progressive" mitral stenosis  Mean gradient of 5 mmHg at 73 bpm  MVA by Doppler continuity equation is 2 15 cm2  There was no regurgitation  AORTIC VALVE: The valve was trileaflet  Leaflets exhibited normal thickness and normal cuspal separation  DOPPLER: Transaortic velocity was within the normal range  There was no evidence for stenosis  There was no regurgitation  TRICUSPID VALVE: The valve structure was normal  There was normal leaflet separation  DOPPLER: The transtricuspid velocity was within the normal range  There was no evidence for stenosis  There was mild regurgitation  Estimated peak PA  pressure was 34 mmHg  PULMONIC VALVE: Not well visualized  DOPPLER: There was no evidence for stenosis  There was trace regurgitation  PERICARDIUM: A trace pericardial effusion was identified  The pericardium was normal in appearance  AORTA: The root exhibited normal size  SYSTEMIC VEINS: IVC: The inferior vena cava was normal in size and course  Respirophasic changes were normal     SYSTEM MEASUREMENT TABLES    2D  %FS: 25 15 %  Ao Diam: 2 77 cm  EDV(Teich): 136 76 ml  EF(Teich): 49 26 %  ESV(Teich): 69 39 ml  IVSd: 1 07 cm  LA Area: 30 26 cm2  LA Diam: 4 91 cm  LVEDV MOD A4C: 210 21 ml  LVEF MOD A4C: 46 53 %  LVESV MOD A4C: 112 41 ml  LVIDd: 5 32 cm  LVIDs: 3 99 cm  LVLd A4C: 9 67 cm  LVLs A4C: 8 21 cm  LVPWd: 1 1 cm  RA Area: 18 38 cm2  RVIDd: 3 28 cm  SV MOD A4C: 97 8 ml  SV(Teich): 67 38 ml    CW  TR Vmax: 2 78 m/s  TR maxP 99 mmHg    MM  TAPSE: 2 42 cm    PW  E' Sept: 0 06 m/s  E/E' Sept: 19 84  MV A Santosh: 1 49 m/s  MV Dec [place]: 9 08 m/s2  MV DecT: 137 82 ms  MV E Santosh: 1 25 m/s  MV E/A Ratio: 0 84  MV PHT: 39 97 ms  MVA By PHT: 5 5 cm2    [employer] Accredited Echocardiography Laboratory    Prepared and electronically signed by    NICOLE Jenkins    Signed 24-Aug-2021 14:54:43    Results for orders placed during the hospital encounter of 21    Cardiac catheterization    Narrative  [address] [facility]  [address]  [facility], [address]  [phone]    [facility]    Invasive Cardiovascular Lab Complete Report    Patient: Claudia Ruiz  MR number: [id number]  Account number: [de-identified]  Study date: 2021  Gender: Female  : 1960  Height: 68 1 in  Weight: 279 4 lb  BSA: 2 36 mï¾²    Allergies: CODEINE, LATEX    Diagnostic Cardiologist:  Jenean Osler, MD  Primary Physician:  Ramirez Andrade    SUMMARY    CORONARY CIRCULATION:  Proximal RCA: There was a 100 % stenosis  This lesion is a chronic total occlusion  INDICATIONS:  --  Possible CAD: myocardial infarction without ST elevation (NSTEMI)  --  Congestive heart failure with ischemic cardiomyopathy  PROCEDURES PERFORMED    --  Left heart catheterization without ventriculogram   --  Left coronary angiography  --  Right coronary angiography  --  Inpatient  --  Mod Sedation Same Physician Initial 15min  --  Coronary Catheterization (w/ LHC)  PROCEDURE: The risks and alternatives of the procedures and conscious sedation were explained to the patient and informed consent was obtained  The patient was brought to the cath lab and placed on the table  The planned puncture sites  were prepped and draped in the usual sterile fashion  --  Right radial artery access  After performing an Erasto's test to verify adequate ulnar artery supply to the hand, the radial site was prepped  The puncture site was infiltrated with local anesthetic  The vessel was accessed using the  modified Seldinger technique, a wire was advanced into the vessel, and a sheath was advanced over the wire into the vessel  --  Left heart catheterization without ventriculogram  A catheter was advanced over a guidewire into the ascending aorta   After recording ascending aortic pressure, the catheter was advanced across the aortic valve and left ventricular  pressure was recorded  The catheter was pulled back across the aortic valve and into the ascending aorta and pullback pressures were obtained  --  Left coronary artery angiography  A catheter was advanced over a guidewire into the aorta and positioned in the left coronary artery ostium under fluoroscopic guidance  Angiography was performed  --  Right coronary artery angiography  A catheter was advanced over a guidewire into the aorta and positioned in the right coronary artery ostium under fluoroscopic guidance  Angiography was performed  --  Inpatient  --  Mod Sedation Same Physician Initial 15min  --  Coronary Catheterization (w/ [facility])  PROCEDURE COMPLETION: The patient tolerated the procedure well and was discharged from the cath lab  TIMING: Test started at 14:33  Test concluded at 14:45  HEMOSTASIS: The sheath was removed  The site was compressed with a Hemoband  device  Hemostasis was obtained  MEDICATIONS GIVEN: Fentanyl (1OOmcg/2 ml), 50 mcg, IV, at 14:30  Versed (2mg/2ml), 2 mg, IV, at 14:30  Zofran (Ondansetron), 4 mg, IV, at 14:34  1% Lidocaine, 2 ml, subcutaneously, at 14:35  Nitroglycerin  (200mcg/ml), 200 mcg, at 14:37  Verapamil (5mg/2ml), 5 mg, IV, at 14:37  Heparin 1000 units/ml, 4,000 units, IV, at 14:37  CONTRAST GIVEN: 30 ml Visipaque (320mg I /ml)  RADIATION EXPOSURE: Fluoroscopy time: 1 32 min  HEMODYNAMICS: Hemodynamic assessment demonstrated normal LVEDP  12-14    CORONARY VESSELS:   --  The coronary circulation is left dominant  --  Left main: The vessel was medium to large sized  Angiography showed mild atherosclerosis  --  LAD: The vessel was large sized  Angiography showed no evidence of disease  --  Proximal LAD: There was a discrete 30 % stenosis at the site of a prior stent  --  Mid LAD: There was a 0 % stenosis at the site of a prior stent  --  Distal LAD: There was a 0 % stenosis at the site of a prior stent  --  Proximal circumflex:  There was a 0 % stenosis at the site of a prior stent  --  RCA: The vessel was small (non-dominant)  --  Proximal RCA: There was a 100 % stenosis  This lesion is a chronic total occlusion  IMPRESSIONS:  Type 2 MI  There is significant single vessel coronary artery disease  RECOMMENDATIONS  The patient should continue with the present medications  DISPOSITION:  The patient left the catheterization laboratory in stable condition  Prepared and signed by    Kilo Garcia MD  Signed 2021 14:55:54    Study diagram    Angiographic findings  Native coronary lesions:  ï¾·Proximal LAD: Lesion 1: discrete, 30 % stenosis, site of prior stent  ï¾·Mid LAD: Lesion 1: 0 % stenosis, site of prior stent  ï¾·Distal LAD: Lesion 1: 0 % stenosis, site of prior stent  ï¾·Proximal circumflex: Lesion 1: 0 % stenosis, site of prior stent  ï¾·Proximal RCA: Lesion 1: 100 % stenosis  Hemodynamic tables    Pressures:  Baseline  Pressures:  - HR: 66  Pressures:  - Rhythm:  Pressures:  -- Aortic Pressure (S/D/M): 153/61/76  Pressures:  -- Left Ventricle (s/edp): 139/12/--  Pressures:  -- Left Ventricle (s/edp): 139/19/--    Outputs:  Baseline  Outputs:  -- CALCULATIONS: [age]  Outputs:  -- CALCULATIONS: Body Surface Area: 2 36  Outputs:  -- CALCULATIONS: Height in cm: 173 00  Outputs:  -- CALCULATIONS: Sex: Female  Outputs:  -- CALCULATIONS: Weight in k 00    No results found for this or any previous visit  Results for orders placed during the hospital encounter of 22    NM myocardial perfusion spect (rx stress and/or rest)    Interpretation Summary  1  Nondiagnostic ECG portion of stress test due to presence of left bundle-branch block abnormality  No angina and no new ST T-wave abnormalities were noted during the stress test   2  Abnormal myocardial perfusion scan  There were multiple perfusion abnormalities involving the inferolateral and lateral wall and anterior and anteroseptal walls    Cannot exclude ischemia involving multiple vessels (triple-vessel and left main disease)  Imaging portion of the stress test is affected by significant soft tissue and diaphragmatic attenuation artifact  3  Dilated left ventricular cavity with moderate to markedly reduced left ventricular systolic function and regional wall motion abnormalities  Post-stress ejection fraction was calculated as 32% although visually it was around 40%  4  Elevated resting blood pressure with appropriate blood pressure response noted during stress test   5  Abnormal baseline ECG with left bundle-branch block  No significant changes and no significant arrhythmia noted during stress test     There is no prior study for comparison  Clinical correlation is recommended  CXR: Results for orders placed during the hospital encounter of 02/10/22    XR chest 2 views    Narrative  CHEST    INDICATION:   dyspnea  COMPARISON:  Chest radiograph 8/23/2021  EXAM PERFORMED/VIEWS:  XR CHEST PA & LATERAL      FINDINGS:    Cardiomediastinal silhouette appears unremarkable  The lungs are clear  No pneumothorax  Possible trace left pleural effusion  Osseous structures appear within normal limits for patient age  Impression  Possible trace left pleural effusion  No consolidation or edema  Workstation performed: [id number]    Results for orders placed during the hospital encounter of 06/21/22    XR chest portable    Narrative  CHEST    INDICATION:   possible MI     COMPARISON:  2/10/2022    EXAM PERFORMED/VIEWS:  XR CHEST PORTABLE  Single view    FINDINGS:    Cardiomediastinal silhouette has become mildly enlarged    Development of probable subsegmental atelectasis or infiltrate right lung base    No pneumothorax or pleural effusion  Osseous structures appear within normal limits for patient age    Lower cervical spinal fixation hardware again evident    Impression  Development of mild cardiomegaly and right basal infiltrate/atelectasis          Workstation performed: [id number]      ECG:  Sinus rhythm, left bundle-branch block    This note was completed in part utilizing M-Modal Fluency Direct Software  Grammatical errors, random word insertions, spelling mistakes, and incomplete sentences may be an occasional consequence of this system secondary to software limitations, ambient noise, and hardware issues  If you have any questions or concerns about the content, text, or information contained within the body of this dictation, please contact the provider for clarification

## 2022-07-07 NOTE — ED NOTES
EKG tigertexted to Dr Josue Llamas, KEVIN  07/06/22 2119       Yi Llamas [facility]  07/06/22 2119

## 2022-07-08 PROBLEM — I47.29 NSVT (NONSUSTAINED VENTRICULAR TACHYCARDIA): Status: ACTIVE | Noted: 2022-07-08

## 2022-07-08 PROBLEM — I47.2 NSVT (NONSUSTAINED VENTRICULAR TACHYCARDIA) (HCC): Status: ACTIVE | Noted: 2022-07-08

## 2022-07-08 LAB
ANION GAP SERPL CALCULATED.3IONS-SCNC: 12 MMOL/L (ref 4–13)
BUN SERPL-MCNC: 66 MG/DL (ref 5–25)
CALCIUM SERPL-MCNC: 7.6 MG/DL (ref 8.3–10.1)
CHLORIDE SERPL-SCNC: 106 MMOL/L (ref 100–108)
CO2 SERPL-SCNC: 26 MMOL/L (ref 21–32)
CREAT SERPL-MCNC: 2.98 MG/DL (ref 0.6–1.3)
ERYTHROCYTE [DISTWIDTH] IN BLOOD BY AUTOMATED COUNT: 16.3 % (ref 11.6–15.1)
GFR SERPL CREATININE-BSD FRML MDRD: 16 ML/MIN/1.73SQ M
GLUCOSE SERPL-MCNC: 131 MG/DL (ref 65–140)
GLUCOSE SERPL-MCNC: 150 MG/DL (ref 65–140)
GLUCOSE SERPL-MCNC: 178 MG/DL (ref 65–140)
GLUCOSE SERPL-MCNC: 191 MG/DL (ref 65–140)
GLUCOSE SERPL-MCNC: 219 MG/DL (ref 65–140)
HCT VFR BLD AUTO: 25.5 % (ref 34.8–46.1)
HGB BLD-MCNC: 7.9 G/DL (ref 11.5–15.4)
MAGNESIUM SERPL-MCNC: 1.8 MG/DL (ref 1.6–2.6)
MCH RBC QN AUTO: 32.1 PG (ref 26.8–34.3)
MCHC RBC AUTO-ENTMCNC: 31 G/DL (ref 31.4–37.4)
MCV RBC AUTO: 104 FL (ref 82–98)
PLATELET # BLD AUTO: 283 THOUSANDS/UL (ref 149–390)
PMV BLD AUTO: 10.3 FL (ref 8.9–12.7)
POTASSIUM SERPL-SCNC: 4.3 MMOL/L (ref 3.5–5.3)
RBC # BLD AUTO: 2.46 MILLION/UL (ref 3.81–5.12)
SODIUM SERPL-SCNC: 144 MMOL/L (ref 136–145)
WBC # BLD AUTO: 6.96 THOUSAND/UL (ref 4.31–10.16)

## 2022-07-08 PROCEDURE — 85027 COMPLETE CBC AUTOMATED: CPT | Performed by: PHYSICIAN ASSISTANT

## 2022-07-08 PROCEDURE — 80048 BASIC METABOLIC PNL TOTAL CA: CPT | Performed by: PHYSICIAN ASSISTANT

## 2022-07-08 PROCEDURE — 99232 SBSQ HOSP IP/OBS MODERATE 35: CPT | Performed by: PHYSICIAN ASSISTANT

## 2022-07-08 PROCEDURE — 99232 SBSQ HOSP IP/OBS MODERATE 35: CPT | Performed by: INTERNAL MEDICINE

## 2022-07-08 PROCEDURE — 82948 REAGENT STRIP/BLOOD GLUCOSE: CPT

## 2022-07-08 PROCEDURE — 83735 ASSAY OF MAGNESIUM: CPT | Performed by: FAMILY MEDICINE

## 2022-07-08 RX ORDER — BISOPROLOL FUMARATE 5 MG/1
2.5 TABLET, FILM COATED ORAL DAILY
Status: DISCONTINUED | OUTPATIENT
Start: 2022-07-08 | End: 2022-07-19 | Stop reason: HOSPADM

## 2022-07-08 RX ADMIN — SENNOSIDES 8.6 MG: 8.6 TABLET, FILM COATED ORAL at 08:00

## 2022-07-08 RX ADMIN — ALLOPURINOL 300 MG: 100 TABLET ORAL at 08:00

## 2022-07-08 RX ADMIN — PANTOPRAZOLE SODIUM 40 MG: 40 TABLET, DELAYED RELEASE ORAL at 15:58

## 2022-07-08 RX ADMIN — HYDROCODONE BITARTRATE AND ACETAMINOPHEN 1 TABLET: 5; 325 TABLET ORAL at 06:40

## 2022-07-08 RX ADMIN — PANTOPRAZOLE SODIUM 40 MG: 40 TABLET, DELAYED RELEASE ORAL at 06:35

## 2022-07-08 RX ADMIN — CITALOPRAM HYDROBROMIDE 40 MG: 20 TABLET ORAL at 08:00

## 2022-07-08 RX ADMIN — AMLODIPINE BESYLATE 10 MG: 10 TABLET ORAL at 08:00

## 2022-07-08 RX ADMIN — TIZANIDINE 4 MG: 4 TABLET ORAL at 22:25

## 2022-07-08 RX ADMIN — INSULIN LISPRO 2 UNITS: 100 INJECTION, SOLUTION INTRAVENOUS; SUBCUTANEOUS at 21:09

## 2022-07-08 RX ADMIN — INSULIN LISPRO 2 UNITS: 100 INJECTION, SOLUTION INTRAVENOUS; SUBCUTANEOUS at 11:24

## 2022-07-08 RX ADMIN — ASPIRIN 81 MG: 81 TABLET, COATED ORAL at 08:00

## 2022-07-08 RX ADMIN — Medication 1 MG/HR: at 11:29

## 2022-07-08 RX ADMIN — HYDROCODONE BITARTRATE AND ACETAMINOPHEN 1 TABLET: 5; 325 TABLET ORAL at 22:25

## 2022-07-08 RX ADMIN — HEPARIN SODIUM 5000 UNITS: 5000 INJECTION INTRAVENOUS; SUBCUTANEOUS at 06:35

## 2022-07-08 RX ADMIN — NYSTATIN: 100000 POWDER TOPICAL at 08:01

## 2022-07-08 RX ADMIN — CLOPIDOGREL BISULFATE 75 MG: 75 TABLET ORAL at 08:00

## 2022-07-08 RX ADMIN — HYDROCODONE BITARTRATE AND ACETAMINOPHEN 1 TABLET: 5; 325 TABLET ORAL at 17:07

## 2022-07-08 RX ADMIN — INSULIN LISPRO 4 UNITS: 100 INJECTION, SOLUTION INTRAVENOUS; SUBCUTANEOUS at 17:06

## 2022-07-08 RX ADMIN — NYSTATIN: 100000 POWDER TOPICAL at 17:06

## 2022-07-08 RX ADMIN — POTASSIUM CHLORIDE 40 MEQ: 1500 TABLET, EXTENDED RELEASE ORAL at 08:00

## 2022-07-08 RX ADMIN — BISOPROLOL FUMARATE 2.5 MG: 5 TABLET ORAL at 19:31

## 2022-07-08 RX ADMIN — GABAPENTIN 400 MG: 400 CAPSULE ORAL at 21:09

## 2022-07-08 RX ADMIN — ISOSORBIDE MONONITRATE 60 MG: 60 TABLET, EXTENDED RELEASE ORAL at 08:00

## 2022-07-08 RX ADMIN — ATORVASTATIN CALCIUM 40 MG: 40 TABLET, FILM COATED ORAL at 08:00

## 2022-07-08 RX ADMIN — GABAPENTIN 400 MG: 400 CAPSULE ORAL at 11:24

## 2022-07-08 RX ADMIN — HEPARIN SODIUM 5000 UNITS: 5000 INJECTION INTRAVENOUS; SUBCUTANEOUS at 21:09

## 2022-07-08 RX ADMIN — GABAPENTIN 400 MG: 400 CAPSULE ORAL at 17:06

## 2022-07-08 RX ADMIN — OLANZAPINE 5 MG: 5 TABLET, FILM COATED ORAL at 21:09

## 2022-07-08 RX ADMIN — GABAPENTIN 400 MG: 400 CAPSULE ORAL at 08:00

## 2022-07-08 RX ADMIN — HEPARIN SODIUM 5000 UNITS: 5000 INJECTION INTRAVENOUS; SUBCUTANEOUS at 13:24

## 2022-07-08 RX ADMIN — LEVOTHYROXINE SODIUM 50 MCG: 50 TABLET ORAL at 06:35

## 2022-07-08 NOTE — ASSESSMENT & PLAN NOTE
Patient was noted for an 8 beat V tach on telemetry, asymptomatic  Beta blocker was on hold with prior episode of bradycardia, will resume  Check Mg level  Continue telemetry monitoring

## 2022-07-08 NOTE — PROGRESS NOTES
[facility]  Progress Note - Ankit Moralez 1960, 63 y o  female MRN: [id number]  Unit/Bed#: [address] [place] [address] 206-01 Encounter: [id number]  Primary Care Provider: CHERELLE Chavira   Date and time admitted to hospital: 7/6/2022  4:59 PM    * 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 heart failure [facility])  Assessment & Plan  Wt Readings from Last 3 Encounters:   07/08/22 129 kg (283 lb 8 2 oz)   07/06/22 136 kg (300 lb)   07/02/22 128 kg (281 lb 12 oz)     With a history of combined systolic and diastolic CHF, most recent echocardiogram revealing ejection fraction of 30%    · Patient recently admitted and underwent cardiac catheterization on 07/01 with PCI to the mid circumflex artery  · Followed up with Cardiology today, noted 20 lb weight gain since discharge 4 days ago  · Has been compliant with medications, maintained on Demadex 40 mg daily as an outpatient  · BNP elevated at 14,930  · Baseline weight approximately 280  · Weight on arrival 298, down to 284 today   · Monitor daily weights, monitor I's and O's  · Continue Bumex drip per cardiology         Mixed hyperlipidemia  Assessment & Plan  · Continue Lipitor daily     Acquired hypothyroidism  Assessment & Plan  · Continue Synthroid 50 mcg daily    Stage 4 chronic kidney disease [facility])  Assessment & Plan  Lab Results   Component Value Date    EGFR 16 07/08/2022    EGFR 19 07/07/2022    EGFR 16 07/06/2022    CREATININE 2 98 (H) 07/08/2022    CREATININE 2 65 (H) 07/07/2022    CREATININE 2 94 (H) 07/06/2022     · Creatinine slightly worsening today to 2 98  · Continue monitoring while on IV diuretics   · Suspect that elevation in creatinine likely secondary to cardiorenal syndrome in the setting of severe volume overload    Anemia  Assessment & Plan  · With history of anemia of chronic disease  · Hemoglobin stable at baseline    CAD (coronary artery disease)  Assessment & Plan  Status post stenting in 2012, then revised in 2017  · Known chronic total occlusion of RCA  · Most recent cardiac catheterization on  with PCI to mid circumflex  · Continue is Plavix, Lipitor, aspirin, Imdur, bisoprolol    Type 2 diabetes mellitus with stage 4 chronic kidney disease, with long-term current use of insulin [facility])  Assessment & Plan  Lab Results   Component Value Date    HGBA1C 7 3 (A) 2022       Recent Labs     22  1056 22  1600 22  2056 22  0751   POCGLU 149* 195* 192* 131       Blood Sugar Average: Last 72 hrs:  (P) 772 [id number]   Patient is maintained on Lantus 50 units b i d  And scheduled Humalog 20 units t i d  With associated sliding scale  · with hypoglycemia during admission  · Will continue to hold basal insulin until blood sugars improve  · Sliding scale insulin a c  HS  · Hypoglycemia protocol        VTE Pharmacologic Prophylaxis:   High Risk (Score >/= 5) - Pharmacological DVT Prophylaxis Ordered: heparin  Sequential Compression Devices Ordered  Patient Centered Rounds: I performed bedside rounds with nursing staff today  Discussions with Specialists or Other Care Team Provider: case management     Education and Discussions with Family / Patient: Updated  (daughter in law) via phone  Time Spent for Care: 30 minutes  More than 50% of total time spent on counseling and coordination of care as described above  Current Length of Stay: 2 day(s)  Current Patient Status: Inpatient   Certification Statement: The patient will continue to require additional inpatient hospital stay due to CHF exacerbation requiring continued diuretics and monitoring  Discharge Plan: Anticipate discharge in 48-72 hrs to home  Code Status: Level 1 - Full Code    Subjective:   Patient notes she is doing well today  Denies any chest pain  Notes improvement in her SOB and lower extremity edema       Objective:     Vitals:   Temp (24hrs), Av 1 °F (36 7 °C), Min:97 4 °F (36 3 °C), Max:98 4 °F (36 9 °C)    Temp:  [97 4 °F (36 3 °C)-98 4 °F (36 9 °C)] 98 °F (36 7 °C)  HR:  [60-70] 64  Resp:  [18-20] 19  BP: (126-166)/(52-67) 166/67  SpO2:  [92 %-96 %] 93 %  Body mass index is 43 11 kg/m²  Input and Output Summary (last 24 hours): Intake/Output Summary (Last 24 hours) at 7/8/2022 1104  Last data filed at 7/8/2022 0802  Gross per 24 hour   Intake 600 ml   Output 4750 ml   Net -4150 ml       Physical Exam:   Physical Exam  Vitals reviewed  Constitutional:       General: She is not in acute distress  HENT:      Head: Normocephalic and atraumatic  Eyes:      General: No scleral icterus  Conjunctiva/sclera: Conjunctivae normal    Cardiovascular:      Rate and Rhythm: Normal rate and regular rhythm  Heart sounds: No murmur heard  Pulmonary:      Effort: Pulmonary effort is normal  No respiratory distress  Breath sounds: Normal breath sounds  Abdominal:      General: Bowel sounds are normal  There is no distension  Palpations: Abdomen is soft  Tenderness: There is no abdominal tenderness  Musculoskeletal:      Cervical back: Neck supple  Right lower leg: Edema present  Left lower leg: Edema present  Skin:     General: Skin is warm and dry  Neurological:      Mental Status: She is alert and oriented to person, place, and time     Psychiatric:         Mood and Affect: Mood normal          Behavior: Behavior normal           Additional Data:     Labs:  Results from last 7 days   Lab Units 07/08/22  0536 07/07/22  0507   WBC Thousand/uL 6 96 7 43   HEMOGLOBIN g/dL 7 9* 7 6*   HEMATOCRIT % 25 5* 24 6*   PLATELETS Thousands/uL 283 298   NEUTROS PCT %  --  62   LYMPHS PCT %  --  25   MONOS PCT %  --  9   EOS PCT %  --  3     Results from last 7 days   Lab Units 07/08/22  0536 07/07/22  0507 07/06/22  1648   SODIUM mmol/L 144   < > 146*   POTASSIUM mmol/L 4 3   < > 4 1   CHLORIDE mmol/L 106   < > 107   CO2 mmol/L 26   < > 24   BUN mg/dL 66*   < > 64* CREATININE mg/dL 2 98*   < > 2 94*   ANION GAP mmol/L 12   < > 15*   CALCIUM mg/dL 7 6*   < > 8 0*   ALBUMIN g/dL  --   --  2 9*   TOTAL BILIRUBIN mg/dL  --   --  0 20   ALK PHOS U/L  --   --  113   ALT U/L  --   --  30   AST U/L  --   --  11   GLUCOSE RANDOM mg/dL 150*   < > 46*    < > = values in this interval not displayed  Results from last 7 days   Lab Units 07/06/22  1648   INR  1 05     Results from last 7 days   Lab Units 07/08/22  0751 07/07/22 2056 07/07/22  1600 07/07/22  1056 07/07/22  0828 07/07/22  0723 07/06/22 2051 07/06/22  1956 07/06/22  1910 07/06/22  1842 07/06/22  1751 07/06/22  1657   POC GLUCOSE mg/dl 131 192* 195* 149* 119 47* 121 84 55* 74 56* 42*               Lines/Drains:  Invasive Devices  Report    Peripheral Intravenous Line  Duration           Peripheral IV 07/06/22 Left Antecubital 1 day          Drain  Duration           Suprapubic Catheter Non-latex 16 Fr  274 days                  Telemetry:  Telemetry Orders (From admission, onward)             48 Hour Telemetry Monitoring  (ED Bridging Orders Panel)  Continuous x 48 hours        Expiring   References:    Telemetry Guidelines   Question:  Reason for 48 Hour Telemetry  Answer:  Acute Decompensated CHF (continuous diuretic infusion or total diuretic dose > 200 mg daily, associated electrolyte derangement, ionotropic drip, history of ventricular arrhythmia, or new EF <35%)                 Telemetry Reviewed: Sinus Bradycardia  Indication for Continued Telemetry Use: Acute CHF on >200 mg lasix/day or equivalent dose or with new reduced EF  Imaging: No pertinent imaging reviewed      Recent Cultures (last 7 days):         Last 24 Hours Medication List:   Current Facility-Administered Medications   Medication Dose Route Frequency Provider Last Rate    acetaminophen  650 mg Oral Q6H PRN Antoine Hazel PA-C      allopurinol  300 mg Oral Daily Antoine Hazel PA-C      amLODIPine  10 mg Oral Daily Antoine Hazel [place]      aspirin  81 mg Oral Daily Frann Severe, PA-C      atorvastatin  40 mg Oral Daily [facility], [place]      bumetanide  1 mg/hr Intravenous Continuous Lorre Jolynn Coombs, DO 1 mg/hr (07/07/22 2301)    citalopram  40 mg Oral Daily Frann Severe, PA-C      clopidogrel  75 mg Oral Daily Frann Severe, [place]      gabapentin  400 mg Oral 4x Daily Frann Severe, PA-C      heparin (porcine)  5,000 Units Subcutaneous [facility] Frann Severe, [place]      HYDROcodone-acetaminophen  1 tablet Oral TID PRN Frann Severe, PA-C      insulin lispro  2-12 Units Subcutaneous HS Frann Severe, PA-C      insulin lispro  2-12 Units Subcutaneous TID  Bon Malhotra MD      isosorbide mononitrate  60 mg Oral Daily Frann Severe, [place]      levothyroxine  50 mcg Oral Early Morning Frann Severe, [place]      nitroglycerin  0 4 mg Sublingual Q5 Min PRN Frann Severe, [place]      nystatin   Topical BID Counts [facility], [place]      OLANZapine  5 mg Oral HS Frann Severe, PA-C      ondansetron  4 mg Intravenous Q6H PRN Frann Severe, PA-C      pantoprazole  40 mg Oral BID AC Frann Severe, PA-C      potassium chloride  40 mEq Oral Daily Counts include [facility], [place]      senna  1 tablet Oral Daily Counts include [facility] [place], [place]      tiZANidine  4 mg Oral Q8H PRN Frann Severe, PA-C          Today, Patient Was Seen By: Matthias Felty, PA-C    **Please Note: This note may have been constructed using a voice recognition system  **

## 2022-07-08 NOTE — ASSESSMENT & PLAN NOTE
Lab Results   Component Value Date    HGBA1C 7 3 (A) 03/29/2022       Recent Labs     07/07/22  1056 07/07/22  1600 07/07/22 2056 07/08/22  0751   POCGLU 149* 195* 192* 131       Blood Sugar Average: Last 72 hrs:  (P) 178 [id number]   Patient is maintained on Lantus 50 units b i d  And scheduled Humalog 20 units t i d  With associated sliding scale    · with hypoglycemia during admission  · Will continue to hold basal insulin until blood sugars improve  · Sliding scale insulin a c  HS  · Hypoglycemia protocol

## 2022-07-08 NOTE — PROGRESS NOTES
[facility]  Progress Note - Cooper Lion 1960, 63 y o  female MRN: [id number]  Unit/Bed#: [address] [place] [address] 206-01 Encounter: [id number]  Primary Care Provider: CHERELLE Zee   Date and time admitted to hospital: 7/6/2022  4:59 PM    NSVT (nonsustained ventricular tachycardia) [facility])  Assessment & Plan  Patient was noted for an 8 beat V tach on telemetry, asymptomatic  Beta blocker was on hold with prior episode of bradycardia, will resume  Check Mg level  Continue telemetry monitoring

## 2022-07-08 NOTE — PLAN OF CARE
Problem: PAIN - ADULT  Goal: Verbalizes/displays adequate comfort level or baseline comfort level  Description: Interventions:  - Encourage patient to monitor pain and request assistance  - Assess pain using appropriate pain scale  - Administer analgesics based on type and severity of pain and evaluate response  - Implement non-pharmacological measures as appropriate and evaluate response  - Consider cultural and social influences on pain and pain management  - Notify physician/advanced practitioner if interventions unsuccessful or patient reports new pain  Outcome: Progressing     Problem: INFECTION - ADULT  Goal: Absence or prevention of progression during hospitalization  Description: INTERVENTIONS:  - Assess and monitor for signs and symptoms of infection  - Monitor lab/diagnostic results  - Monitor all insertion sites, i e  indwelling lines, tubes, and drains  - Monitor endotracheal if appropriate and nasal secretions for changes in amount and color  - [place] appropriate cooling/warming therapies per order  - Administer medications as ordered  - Instruct and encourage patient and family to use good hand hygiene technique  - Identify and instruct in appropriate isolation precautions for identified infection/condition  Outcome: Progressing     Problem: SAFETY ADULT  Goal: Patient will remain free of falls  Description: INTERVENTIONS:  - Educate patient/family on patient safety including physical limitations  - Instruct patient to call for assistance with activity   - Consult OT/PT to assist with strengthening/mobility   - Keep Call bell within reach  - Keep bed low and locked with side rails adjusted as appropriate  - Keep care items and personal belongings within reach  - Initiate and maintain comfort rounds  - Make Fall Risk Sign visible to staff  - Offer Toileting every Hours, in advance of need  - Initiate/Maintain alarm  - Obtain necessary fall risk management equipment:   - Apply yellow socks and bracelet for high fall risk patients  - Consider moving patient to room near nurses station  Outcome: Progressing  Goal: Maintain or return to baseline ADL function  Description: INTERVENTIONS:  -  Assess patient's ability to carry out ADLs; assess patient's baseline for ADL function and identify physical deficits which impact ability to perform ADLs (bathing, care of mouth/teeth, toileting, grooming, dressing, etc )  - Assess/evaluate cause of self-care deficits   - Assess range of motion  - Assess patient's mobility; develop plan if impaired  - Assess patient's need for assistive devices and provide as appropriate  - Encourage maximum independence but intervene and supervise when necessary  - Involve family in performance of ADLs  - Assess for home care needs following discharge   - Consider OT consult to assist with ADL evaluation and planning for discharge  - Provide patient education as appropriate  Outcome: Progressing  Goal: Maintains/Returns to pre admission functional level  Description: INTERVENTIONS:  - Perform BMAT or MOVE assessment daily    - Set and communicate daily mobility goal to care team and patient/family/caregiver  - Collaborate with rehabilitation services on mobility goals if consulted  - Perform Range of Motion  times a day  - Reposition patient every  hours    - Dangle patient  times a day  - Stand patient  times a day  - Ambulate patient  times a day  - Out of bed to chair  times a day   - Out of bed for meals  times a day  - Out of bed for toileting  - Record patient progress and toleration of activity level   Outcome: Progressing     Problem: DISCHARGE PLANNING  Goal: Discharge to home or other facility with appropriate resources  Description: INTERVENTIONS:  - Identify barriers to discharge w/patient and caregiver  - Arrange for needed discharge resources and transportation as appropriate  - Identify discharge learning needs (meds, wound care, etc )  - Arrange for interpretive services to assist at discharge as needed  - Refer to Case Management Department for coordinating discharge planning if the patient needs post-hospital services based on physician/advanced practitioner order or complex needs related to functional status, cognitive ability, or social support system  Outcome: Progressing     Problem: Knowledge Deficit  Goal: Patient/family/caregiver demonstrates understanding of disease process, treatment plan, medications, and discharge instructions  Description: Complete learning assessment and assess knowledge base    Interventions:  - Provide teaching at level of understanding  - Provide teaching via preferred learning methods  Outcome: Progressing     Problem: CARDIOVASCULAR - ADULT  Goal: Maintains optimal cardiac output and hemodynamic stability  Description: INTERVENTIONS:  - Monitor I/O, vital signs and rhythm  - Monitor for S/S and trends of decreased cardiac output  - Administer and titrate ordered vasoactive medications to optimize hemodynamic stability  - Assess quality of pulses, skin color and temperature  - Assess for signs of decreased coronary artery perfusion  - Instruct patient to report change in severity of symptoms  Outcome: Progressing  Goal: Absence of cardiac dysrhythmias or at baseline rhythm  Description: INTERVENTIONS:  - Continuous cardiac monitoring, vital signs, obtain 12 lead EKG if ordered  - Administer antiarrhythmic and heart rate control medications as ordered  - Monitor electrolytes and administer replacement therapy as ordered  Outcome: Progressing     Problem: SKIN/TISSUE INTEGRITY - ADULT  Goal: Skin Integrity remains intact(Skin Breakdown Prevention)  Description: Assess:  -Perform [place] assessment every   -Clean and moisturize skin every   -Inspect skin when repositioning, toileting, and assisting with ADLS  -Assess under medical devices such as  every   -Assess extremities for adequate circulation and sensation     Bed Management:  -Have minimal linens on bed & keep smooth, unwrinkled  -Change linens as needed when moist or perspiring  -Avoid sitting or lying in one position for more than  hours while in bed  -Keep HOB at degrees     Toileting:  -Offer bedside commode  -Assess for incontinence every   -Use incontinent care products after each incontinent episode such as     Activity:  -Mobilize patient  times a day  -Encourage activity and walks on unit  -Encourage or provide ROM exercises   -Turn and reposition patient every  Hours  -Use appropriate equipment to lift or move patient in bed  -Instruct/ Assist with weight shifting every  when out of bed in chair  -Consider limitation of chair time  hour intervals    Skin Care:  -Avoid use of baby powder, tape, friction and shearing, hot water or constrictive clothing  -Relieve pressure over bony prominences using   -Do not massage red bony areas    Next Steps:  -Teach patient strategies to minimize risks such as    -Consider consults to  interdisciplinary teams such as   Outcome: Progressing  Goal: Incision(s), wounds(s) or drain site(s) healing without S/S of infection  Description: INTERVENTIONS  - Assess and document dressing, incision, wound bed, drain sites and surrounding tissue  - Provide patient and family education  - Perform skin care/dressing changes every   Outcome: Progressing     Problem: Potential for Falls  Goal: Patient will remain free of falls  Description: INTERVENTIONS:  - Educate patient/family on patient safety including physical limitations  - Instruct patient to call for assistance with activity   - Consult OT/PT to assist with strengthening/mobility   - Keep Call bell within reach  - Keep bed low and locked with side rails adjusted as appropriate  - Keep care items and personal belongings within reach  - Initiate and maintain comfort rounds  - Make Fall Risk Sign visible to staff  - Offer Toileting every  Hours, in advance of need  - Initiate/Maintain alarm  - Obtain necessary fall risk management equipment:   - Apply yellow socks and bracelet for high fall risk patients  - Consider moving patient to room near nurses station  Outcome: Progressing     Problem: Prexisting or High Potential for Compromised Skin Integrity  Goal: Skin integrity is maintained or improved  Description: INTERVENTIONS:  - Identify patients at risk for skin breakdown  - Assess and monitor skin integrity  - Assess and monitor nutrition and hydration status  - Monitor labs   - Assess for incontinence   - Turn and reposition patient  - Assist with mobility/ambulation  - Relieve pressure over bony prominences  - Avoid friction and shearing  - Provide appropriate hygiene as needed including keeping skin clean and dry  - Evaluate need for skin moisturizer/barrier cream  - Collaborate with interdisciplinary team   - Patient/family teaching  - Consider wound care consult   Outcome: Progressing     Problem: MOBILITY - ADULT  Goal: Maintain or return to baseline ADL function  Description: INTERVENTIONS:  -  Assess patient's ability to carry out ADLs; assess patient's baseline for ADL function and identify physical deficits which impact ability to perform ADLs (bathing, care of mouth/teeth, toileting, grooming, dressing, etc )  - Assess/evaluate cause of self-care deficits   - Assess range of motion  - Assess patient's mobility; develop plan if impaired  - Assess patient's need for assistive devices and provide as appropriate  - Encourage maximum independence but intervene and supervise when necessary  - Involve family in performance of ADLs  - Assess for home care needs following discharge   - Consider OT consult to assist with ADL evaluation and planning for discharge  - Provide patient education as appropriate  Outcome: Progressing  Goal: Maintains/Returns to pre admission functional level  Description: INTERVENTIONS:  - Perform BMAT or MOVE assessment daily    - Set and communicate daily mobility goal to care team and patient/family/caregiver  - Collaborate with rehabilitation services on mobility goals if consulted  - Perform Range of Motion  times a day  - Reposition patient every  hours    - Dangle patient  times a day  - Stand patient  times a day  - Ambulate patient  times a day  - Out of bed to chair  times a day   - Out of bed for meals times a day  - Out of bed for toileting  - Record patient progress and toleration of activity level   Outcome: Progressing

## 2022-07-08 NOTE — PROGRESS NOTES
Cardiology         Progress Note - Cardiology   Drew Rm 63 y o  female MRN: [id number]  Unit/Bed#: [facility] 2 -01 Encounter: [id number]          Assessment/Recommendations/Discussion:   · Acute on chronic combined systolic and diastolic heart failure  ? LVEF 38%, moderate LVH, mild LA dilated, AV sclerosis, trace AR, mild MR, MV sclerosis, mild TR, June 2022  · CAD with multiple PCIs  ? NSTEMI s/p JANET-mLCx w/ small 95% pRCA and diffuse moderate disease of LAD, July 2022  ? s/p -pRCA, August 2021  ? s/p JANET-mLAD, JANET-D1 and JANET-LCx, December 2012  · Recent Acute renal failure on chronic kidney disease  · Recent acute enteritis  · Ischemic cardiomyopathy with moderate to markedly reduced LV systolic function  · Dyslipidemia  · Morbid obesity  · CKD stage 4  · Anemia of chronic kidney disease  · Type 2 diabetes mellitus  · PCOS  · Fibromyalgia  · Neurogenic bladder with suprapubic catheter in place  · Obstructive sleep apnea, uncorrected  · Syncope with orthostatic hypotension  · Mild pulmonary hypertension      · Excellent urine output on Bumex infusion  Will continue the same  Still edematous  Await morning labs and morning weight  · Continue aspirin, clopidogrel, high-dose atorvastatin for CAD and recent stenting  · Continue amlodipine and isosorbide for hypertension    Elevated this morning, but will continue Bumex infusion which hopefully will continue improving blood pressure            Subjective:  Patient seen and examined, feels slightly better, still intermittent headaches                Physical Exam:  GEN:  NAD  HEENT:  MMM, NCAT, pink conjunctiva, EOMI, nonicteric sclera  CV:  NO JVD/HJR, RR, NO M/R/G, +S1/S2, NO PARASTERNAL HEAVE/THRILL, ++LE EDEMA, NO HEPATIC SYSTOLIC PULSATION, WARM EXTREMITIES  RESP:  CTAB/L  ABD:  SOFT, NT, NO GROSS ORGANOMEGALY        Vitals:   /67   Pulse 62   Temp 98 2 °F (36 8 °C)   Resp 18   Wt 133 kg (292 lb 8 8 oz)   SpO2 92%   BMI 44 48 kg/m² Vitals:    07/06/22 1634 07/07/22 0600   Weight: 135 kg (298 lb 1 oz) 133 kg (292 lb 8 8 oz)       Intake/Output Summary (Last 24 hours) at 7/8/2022 0717  Last data filed at 7/8/2022 0344  Gross per 24 hour   Intake 540 ml   Output 5500 ml   Net -4960 ml       TELEMETRY:  Sinus rhythm  Lab Results:  Results from last 7 days   Lab Units 07/08/22  0536   WBC Thousand/uL 6 96   HEMOGLOBIN g/dL 7 9*   HEMATOCRIT % 25 5*   PLATELETS Thousands/uL 283     Results from last 7 days   Lab Units 07/08/22  0536 07/07/22  0507 07/06/22  1648   POTASSIUM mmol/L 4 3   < > 4 1   CHLORIDE mmol/L 106   < > 107   CO2 mmol/L 26   < > 24   BUN mg/dL 66*   < > 64*   CREATININE mg/dL 2 98*   < > 2 94*   CALCIUM mg/dL 7 6*   < > 8 0*   ALK PHOS U/L  --   --  113   ALT U/L  --   --  30   AST U/L  --   --  11    < > = values in this interval not displayed       Results from last 7 days   Lab Units 07/08/22  0536   POTASSIUM mmol/L 4 3   CHLORIDE mmol/L 106   CO2 mmol/L 26   BUN mg/dL 66*   CREATININE mg/dL 2 98*   CALCIUM mg/dL 7 6*           Medications:    Current Facility-Administered Medications:     acetaminophen (TYLENOL) tablet 650 mg, 650 mg, Oral, Q6H PRN, Ravi Lignol, PA-C, 650 mg at 07/07/22 1951    allopurinol (ZYLOPRIM) tablet 300 mg, 300 mg, Oral, Daily, Ravi Merck PA-C, 300 mg at 07/07/22 0829    amLODIPine (NORVASC) tablet 10 mg, 10 mg, Oral, Daily, Ravi Merck PA-C, 10 mg at 07/07/22 0347    aspirin (ECOTRIN LOW STRENGTH) EC tablet 81 mg, 81 mg, Oral, Daily, Ravi Merck PA-C, 81 mg at 07/07/22 7740    atorvastatin (LIPITOR) tablet 40 mg, 40 mg, Oral, Daily, Ravi Merck PA-C, 40 mg at 07/07/22 0829    bumetanide (BUMEX) 12 5 mg infusion 50 mL, 1 mg/hr, Intravenous, Continuous, Vilma Coombs DO, Last Rate: 4 mL/hr at 07/07/22 2301, 1 mg/hr at 07/07/22 2301    citalopram (CeleXA) tablet 40 mg, 40 mg, Oral, Daily, Ravi Nickerson PA-C, 40 mg at 07/07/22 0829    clopidogrel (PLAVIX) tablet 75 mg, 75 mg, Oral, Daily, Mikel Richardson PA-C, 75 mg at 07/07/22 6663    gabapentin (NEURONTIN) capsule 400 mg, 400 mg, Oral, 4x Daily, Mikel Richardson PA-C, 400 mg at 07/07/22 2151    heparin (porcine) subcutaneous injection 5,000 Units, 5,000 Units, Subcutaneous, Q8H [address], 5,000 Units at 07/08/22 9196 **AND** [CANCELED] Platelet count, , , Once, Mikel Richardson PA-C    HYDROcodone-acetaminophen [facility]) 5-325 mg per tablet 1 tablet, 1 tablet, Oral, TID PRN, Mikel Richardson PA-C, 1 tablet at 07/08/22 0640    insulin lispro (HumaLOG) 100 units/mL subcutaneous injection 2-12 Units, 2-12 Units, Subcutaneous, HS, Mikel Richardson PA-C, 2 Units at 07/07/22 2151    insulin lispro (HumaLOG) 100 units/mL subcutaneous injection 2-12 Units, 2-12 Units, Subcutaneous, TID AC **AND** Fingerstick Glucose (POCT), , , 4x Daily AC and at bedtime, Becky Kee MD    isosorbide mononitrate (IMDUR) 24 hr tablet 60 mg, 60 mg, Oral, Daily, Mikel Richardson PA-C, 60 mg at 07/07/22 0282    levothyroxine tablet 50 mcg, 50 mcg, Oral, Early Morning, Mikel Richardson PA-C, 50 mcg at 07/08/22 3907    nitroglycerin (NITROSTAT) SL tablet 0 4 mg, 0 4 mg, Sublingual, Q5 Min PRN, Mikel Richardson PA-C    nystatin (MYCOSTATIN) powder, , Topical, BID, Mikel Richardson PA-C, Given at 07/07/22 1723    OLANZapine (ZyPREXA) tablet 5 mg, 5 mg, Oral, HS, Mikel Rcihardson PA-C, 5 mg at 07/07/22 2150    ondansetron (ZOFRAN) injection 4 mg, 4 mg, Intravenous, Q6H PRN, Mikel [place], PA-C    pantoprazole (PROTONIX) EC tablet 40 mg, 40 mg, Oral, BID AC, Mikel Richardson PA-C, 40 mg at 07/08/22 3590    potassium chloride (K-DUR,KLOR-CON) CR tablet 40 mEq, 40 mEq, Oral, Daily, Mikel Richardson PA-C, 40 mEq at 07/07/22 0845    senna (SENOKOT) tablet 8 6 mg, 1 tablet, Oral, Daily, Mikel Richardson PA-C, 8 6 mg at 07/07/22 0845    tiZANidine (ZANAFLEX) tablet 4 mg, 4 mg, Oral, Q8H PRN, Mikel Richardson PA-C    This note was completed in part utilizing [employer] Fluency Direct Software    Grammatical errors, random word insertions, spelling mistakes, and incomplete sentences may be an occasional consequence of this system secondary to software limitations, ambient noise, and hardware issues  If you have any questions or concerns about the content, text, or information contained within the body of this dictation, please contact the provider for clarification

## 2022-07-08 NOTE — ASSESSMENT & PLAN NOTE
Lab Results   Component Value Date    EGFR 16 07/08/2022    EGFR 19 07/07/2022    EGFR 16 07/06/2022    CREATININE 2 98 (H) 07/08/2022    CREATININE 2 65 (H) 07/07/2022    CREATININE 2 94 (H) 07/06/2022     · Creatinine slightly worsening today to 2 98  · Continue monitoring while on IV diuretics   · Suspect that elevation in creatinine likely secondary to cardiorenal syndrome in the setting of severe volume overload

## 2022-07-08 NOTE — PLAN OF CARE
Problem: PAIN - ADULT  Goal: Verbalizes/displays adequate comfort level or baseline comfort level  Description: Interventions:  - Encourage patient to monitor pain and request assistance  - Assess pain using appropriate pain scale  - Administer analgesics based on type and severity of pain and evaluate response  - Implement non-pharmacological measures as appropriate and evaluate response  - Consider cultural and social influences on pain and pain management  - Notify physician/advanced practitioner if interventions unsuccessful or patient reports new pain  Outcome: Progressing     Problem: INFECTION - ADULT  Goal: Absence or prevention of progression during hospitalization  Description: INTERVENTIONS:  - Assess and monitor for signs and symptoms of infection  - Monitor lab/diagnostic results  - Monitor all insertion sites, i e  indwelling lines, tubes, and drains  - Monitor endotracheal if appropriate and nasal secretions for changes in amount and color  - [place] appropriate cooling/warming therapies per order  - Administer medications as ordered  - Instruct and encourage patient and family to use good hand hygiene technique  - Identify and instruct in appropriate isolation precautions for identified infection/condition  Outcome: Progressing     Problem: SAFETY ADULT  Goal: Patient will remain free of falls  Description: INTERVENTIONS:  - Educate patient/family on patient safety including physical limitations  - Instruct patient to call for assistance with activity   - Consult OT/PT to assist with strengthening/mobility   - Keep Call bell within reach  - Keep bed low and locked with side rails adjusted as appropriate  - Keep care items and personal belongings within reach  - Initiate and maintain comfort rounds  - Make Fall Risk Sign visible to staff  - Offer Toileting every  2 Hours, in advance of need  - Obtain necessary fall risk management equipmen  - Apply yellow socks and bracelet for high fall risk patients  - Consider moving patient to room near nurses station  Outcome: Progressing  Goal: Maintain or return to baseline ADL function  Description: INTERVENTIONS:  -  Assess patient's ability to carry out ADLs; assess patient's baseline for ADL function and identify physical deficits which impact ability to perform ADLs (bathing, care of mouth/teeth, toileting, grooming, dressing, etc )  - Assess/evaluate cause of self-care deficits   - Assess range of motion  - Assess patient's mobility; develop plan if impaired  - Assess patient's need for assistive devices and provide as appropriate  - Encourage maximum independence but intervene and supervise when necessary  - Involve family in performance of ADLs  - Assess for home care needs following discharge   - Consider OT consult to assist with ADL evaluation and planning for discharge  - Provide patient education as appropriate  Outcome: Progressing  Goal: Maintains/Returns to pre admission functional level  Description: INTERVENTIONS:  - Perform BMAT or MOVE assessment daily    - Set and communicate daily mobility goal to care team and patient/family/caregiver     - Collaborate with rehabilitation services on mobility goals if consulted  - Ambulate patient 3 times a day  - Out of bed to chair 3  times a day   - Out of bed for meals 3 times a day  - Out of bed for toileting  - Record patient progress and toleration of activity level   Outcome: Progressing     Problem: DISCHARGE PLANNING  Goal: Discharge to home or other facility with appropriate resources  Description: INTERVENTIONS:  - Identify barriers to discharge w/patient and caregiver  - Arrange for needed discharge resources and transportation as appropriate  - Identify discharge learning needs (meds, wound care, etc )  - Arrange for interpretive services to assist at discharge as needed  - Refer to Case Management Department for coordinating discharge planning if the patient needs post-hospital services based on physician/advanced practitioner order or complex needs related to functional status, cognitive ability, or social support system  Outcome: Progressing     Problem: Knowledge Deficit  Goal: Patient/family/caregiver demonstrates understanding of disease process, treatment plan, medications, and discharge instructions  Description: Complete learning assessment and assess knowledge base    Interventions:  - Provide teaching at level of understanding  - Provide teaching via preferred learning methods  Outcome: Progressing     Problem: CARDIOVASCULAR - ADULT  Goal: Maintains optimal cardiac output and hemodynamic stability  Description: INTERVENTIONS:  - Monitor I/O, vital signs and rhythm  - Monitor for S/S and trends of decreased cardiac output  - Administer and titrate ordered vasoactive medications to optimize hemodynamic stability  - Assess quality of pulses, skin color and temperature  - Assess for signs of decreased coronary artery perfusion  - Instruct patient to report change in severity of symptoms  Outcome: Progressing  Goal: Absence of cardiac dysrhythmias or at baseline rhythm  Description: INTERVENTIONS:  - Continuous cardiac monitoring, vital signs, obtain 12 lead EKG if ordered  - Administer antiarrhythmic and heart rate control medications as ordered  - Monitor electrolytes and administer replacement therapy as ordered  Outcome: Progressing     Problem: SKIN/TISSUE INTEGRITY - ADULT  Goal: Skin Integrity remains intact(Skin Breakdown Prevention)  Description: Assess:  -Perform [place] assessment twice day  -Clean and moisturize skin every day    Outcome: Progressing  Goal: Incision(s), wounds(s) or drain site(s) healing without S/S of infection  Description: INTERVENTIONS  - Assess and document dressing, incision, wound bed, drain sites and surrounding tissue  - Provide patient and family education    Outcome: Progressing     Problem: Potential for Falls  Goal: Patient will remain free of falls  Description: INTERVENTIONS:  - Educate patient/family on patient safety including physical limitations  - Instruct patient to call for assistance with activity   - Consult OT/PT to assist with strengthening/mobility   - Keep Call bell within reach  - Keep bed low and locked with side rails adjusted as appropriate  - Keep care items and personal belongings within reach  - Initiate and maintain comfort rounds  - Make Fall Risk Sign visible to staff  - Offer Toileting every  2 Hours, in advance of need  - Obtain necessary fall risk management equipmen  - Apply yellow socks and bracelet for high fall risk patients  - Consider moving patient to room near nurses station  Outcome: Progressing     Problem: Prexisting or High Potential for Compromised Skin Integrity  Goal: Skin integrity is maintained or improved  Description: INTERVENTIONS:  - Identify patients at risk for skin breakdown  - Assess and monitor skin integrity  - Assess and monitor nutrition and hydration status  - Monitor labs   - Assess for incontinence   - Turn and reposition patient  - Assist with mobility/ambulation  - Relieve pressure over bony prominences  - Avoid friction and shearing  - Provide appropriate hygiene as needed including keeping skin clean and dry  - Evaluate need for skin moisturizer/barrier cream  - Collaborate with interdisciplinary team   - Patient/family teaching  - Consider wound care consult   Outcome: Progressing     Problem: MOBILITY - ADULT  Goal: Maintain or return to baseline ADL function  Description: INTERVENTIONS:  -  Assess patient's ability to carry out ADLs; assess patient's baseline for ADL function and identify physical deficits which impact ability to perform ADLs (bathing, care of mouth/teeth, toileting, grooming, dressing, etc )  - Assess/evaluate cause of self-care deficits   - Assess range of motion  - Assess patient's mobility; develop plan if impaired  - Assess patient's need for assistive devices and provide as appropriate  - Encourage maximum independence but intervene and supervise when necessary  - Involve family in performance of ADLs  - Assess for home care needs following discharge   - Consider OT consult to assist with ADL evaluation and planning for discharge  - Provide patient education as appropriate  Outcome: Progressing  Goal: Maintains/Returns to pre admission functional level  Description: INTERVENTIONS:  - Perform BMAT or MOVE assessment daily    - Set and communicate daily mobility goal to care team and patient/family/caregiver     - Collaborate with rehabilitation services on mobility goals if consulted  - Ambulate patient 3 times a day  - Out of bed to chair 3  times a day   - Out of bed for meals 3 times a day  - Out of bed for toileting  - Record patient progress and toleration of activity level   Outcome: Progressing

## 2022-07-08 NOTE — PLAN OF CARE
Problem: PAIN - ADULT  Goal: Verbalizes/displays adequate comfort level or baseline comfort level  Description: Interventions:  - Encourage patient to monitor pain and request assistance  - Assess pain using appropriate pain scale  - Administer analgesics based on type and severity of pain and evaluate response  - Implement non-pharmacological measures as appropriate and evaluate response  - Consider cultural and social influences on pain and pain management  - Notify physician/advanced practitioner if interventions unsuccessful or patient reports new pain  Outcome: Progressing     Problem: INFECTION - ADULT  Goal: Absence or prevention of progression during hospitalization  Description: INTERVENTIONS:  - Assess and monitor for signs and symptoms of infection  - Monitor lab/diagnostic results  - Monitor all insertion sites, i e  indwelling lines, tubes, and drains  - Monitor endotracheal if appropriate and nasal secretions for changes in amount and color  - [place] appropriate cooling/warming therapies per order  - Administer medications as ordered  - Instruct and encourage patient and family to use good hand hygiene technique  - Identify and instruct in appropriate isolation precautions for identified infection/condition  Outcome: Progressing     Problem: SAFETY ADULT  Goal: Patient will remain free of falls  Description: INTERVENTIONS:  - Educate patient/family on patient safety including physical limitations  - Instruct patient to call for assistance with activity   - Consult OT/PT to assist with strengthening/mobility   - Keep Call bell within reach  - Keep bed low and locked with side rails adjusted as appropriate  - Keep care items and personal belongings within reach  - Initiate and maintain comfort rounds  - Make Fall Risk Sign visible to staff  - Offer Toileting every  2 Hours, in advance of need  - Obtain necessary fall risk management equipmen  - Apply yellow socks and bracelet for high fall risk patients  - Consider moving patient to room near nurses station  Outcome: Progressing  Goal: Maintain or return to baseline ADL function  Description: INTERVENTIONS:  -  Assess patient's ability to carry out ADLs; assess patient's baseline for ADL function and identify physical deficits which impact ability to perform ADLs (bathing, care of mouth/teeth, toileting, grooming, dressing, etc )  - Assess/evaluate cause of self-care deficits   - Assess range of motion  - Assess patient's mobility; develop plan if impaired  - Assess patient's need for assistive devices and provide as appropriate  - Encourage maximum independence but intervene and supervise when necessary  - Involve family in performance of ADLs  - Assess for home care needs following discharge   - Consider OT consult to assist with ADL evaluation and planning for discharge  - Provide patient education as appropriate  Outcome: Progressing  Goal: Maintains/Returns to pre admission functional level  Description: INTERVENTIONS:  - Perform BMAT or MOVE assessment daily    - Set and communicate daily mobility goal to care team and patient/family/caregiver     - Collaborate with rehabilitation services on mobility goals if consulted  - Ambulate patient 3 times a day  - Out of bed to chair 3  times a day   - Out of bed for meals 3 times a day  - Out of bed for toileting  - Record patient progress and toleration of activity level   Outcome: Progressing     Problem: DISCHARGE PLANNING  Goal: Discharge to home or other facility with appropriate resources  Description: INTERVENTIONS:  - Identify barriers to discharge w/patient and caregiver  - Arrange for needed discharge resources and transportation as appropriate  - Identify discharge learning needs (meds, wound care, etc )  - Arrange for interpretive services to assist at discharge as needed  - Refer to Case Management Department for coordinating discharge planning if the patient needs post-hospital services based on physician/advanced practitioner order or complex needs related to functional status, cognitive ability, or social support system  Outcome: Progressing     Problem: Knowledge Deficit  Goal: Patient/family/caregiver demonstrates understanding of disease process, treatment plan, medications, and discharge instructions  Description: Complete learning assessment and assess knowledge base    Interventions:  - Provide teaching at level of understanding  - Provide teaching via preferred learning methods  Outcome: Progressing     Problem: CARDIOVASCULAR - ADULT  Goal: Maintains optimal cardiac output and hemodynamic stability  Description: INTERVENTIONS:  - Monitor I/O, vital signs and rhythm  - Monitor for S/S and trends of decreased cardiac output  - Administer and titrate ordered vasoactive medications to optimize hemodynamic stability  - Assess quality of pulses, skin color and temperature  - Assess for signs of decreased coronary artery perfusion  - Instruct patient to report change in severity of symptoms  Outcome: Progressing  Goal: Absence of cardiac dysrhythmias or at baseline rhythm  Description: INTERVENTIONS:  - Continuous cardiac monitoring, vital signs, obtain 12 lead EKG if ordered  - Administer antiarrhythmic and heart rate control medications as ordered  - Monitor electrolytes and administer replacement therapy as ordered  Outcome: Progressing     Problem: SKIN/TISSUE INTEGRITY - ADULT  Goal: Skin Integrity remains intact(Skin Breakdown Prevention)  Description: Assess:  -Perform [place] assessment twice day  -Clean and moisturize skin every day    Outcome: Progressing  Goal: Incision(s), wounds(s) or drain site(s) healing without S/S of infection  Description: INTERVENTIONS  - Assess and document dressing, incision, wound bed, drain sites and surrounding tissue  - Provide patient and family education    Outcome: Progressing     Problem: Potential for Falls  Goal: Patient will remain free of falls  Description: INTERVENTIONS:  - Educate patient/family on patient safety including physical limitations  - Instruct patient to call for assistance with activity   - Consult OT/PT to assist with strengthening/mobility   - Keep Call bell within reach  - Keep bed low and locked with side rails adjusted as appropriate  - Keep care items and personal belongings within reach  - Initiate and maintain comfort rounds  - Make Fall Risk Sign visible to staff  - Offer Toileting every  2 Hours, in advance of need  - Obtain necessary fall risk management equipmen  - Apply yellow socks and bracelet for high fall risk patients  - Consider moving patient to room near nurses station  Outcome: Progressing     Problem: Prexisting or High Potential for Compromised Skin Integrity  Goal: Skin integrity is maintained or improved  Description: INTERVENTIONS:  - Identify patients at risk for skin breakdown  - Assess and monitor skin integrity  - Assess and monitor nutrition and hydration status  - Monitor labs   - Assess for incontinence   - Turn and reposition patient  - Assist with mobility/ambulation  - Relieve pressure over bony prominences  - Avoid friction and shearing  - Provide appropriate hygiene as needed including keeping skin clean and dry  - Evaluate need for skin moisturizer/barrier cream  - Collaborate with interdisciplinary team   - Patient/family teaching  - Consider wound care consult   Outcome: Progressing     Problem: MOBILITY - ADULT  Goal: Maintain or return to baseline ADL function  Description: INTERVENTIONS:  -  Assess patient's ability to carry out ADLs; assess patient's baseline for ADL function and identify physical deficits which impact ability to perform ADLs (bathing, care of mouth/teeth, toileting, grooming, dressing, etc )  - Assess/evaluate cause of self-care deficits   - Assess range of motion  - Assess patient's mobility; develop plan if impaired  - Assess patient's need for assistive devices and provide as appropriate  - Encourage maximum independence but intervene and supervise when necessary  - Involve family in performance of ADLs  - Assess for home care needs following discharge   - Consider OT consult to assist with ADL evaluation and planning for discharge  - Provide patient education as appropriate  Outcome: Progressing  Goal: Maintains/Returns to pre admission functional level  Description: INTERVENTIONS:  - Perform BMAT or MOVE assessment daily    - Set and communicate daily mobility goal to care team and patient/family/caregiver     - Collaborate with rehabilitation services on mobility goals if consulted  - Ambulate patient 3 times a day  - Out of bed to chair 3  times a day   - Out of bed for meals 3 times a day  - Out of bed for toileting  - Record patient progress and toleration of activity level   Outcome: Progressing

## 2022-07-08 NOTE — CONSULTS
Consult: Heart Failure    Provided verbal and written Heart Failure Nutrition Therapy diet education  Reviewed foods high in Na to limit, provided alternate suggestions, discussed tips for flavoring versus salt, encouraged reading food labels  Discussed fluid restriction  Encouraged to monitor weight at home  Continue with cardiac diet, 1500mL fluid restriction  Added CCD1 diet

## 2022-07-08 NOTE — ASSESSMENT & PLAN NOTE
Wt Readings from Last 3 Encounters:   07/08/22 129 kg (283 lb 8 2 oz)   07/06/22 136 kg (300 lb)   07/02/22 128 kg (281 lb 12 oz)     With a history of combined systolic and diastolic CHF, most recent echocardiogram revealing ejection fraction of 30%    · Patient recently admitted and underwent cardiac catheterization on 07/01 with PCI to the mid circumflex artery  · Followed up with Cardiology today, noted 20 lb weight gain since discharge 4 days ago  · Has been compliant with medications, maintained on Demadex 40 mg daily as an outpatient  · BNP elevated at 14,930  · Baseline weight approximately 280  · Weight on arrival 298, down to 284 today   · Monitor daily weights, monitor I's and O's  · Continue Bumex drip per cardiology

## 2022-07-09 LAB
ANION GAP SERPL CALCULATED.3IONS-SCNC: 10 MMOL/L (ref 4–13)
ATRIAL RATE: 63 BPM
BUN SERPL-MCNC: 73 MG/DL (ref 5–25)
CALCIUM SERPL-MCNC: 7.9 MG/DL (ref 8.3–10.1)
CARDIAC TROPONIN I PNL SERPL HS: 35 NG/L (ref 8–18)
CHLORIDE SERPL-SCNC: 104 MMOL/L (ref 100–108)
CO2 SERPL-SCNC: 28 MMOL/L (ref 21–32)
CREAT SERPL-MCNC: 2.97 MG/DL (ref 0.6–1.3)
GFR SERPL CREATININE-BSD FRML MDRD: 16 ML/MIN/1.73SQ M
GLUCOSE SERPL-MCNC: 192 MG/DL (ref 65–140)
GLUCOSE SERPL-MCNC: 219 MG/DL (ref 65–140)
GLUCOSE SERPL-MCNC: 219 MG/DL (ref 65–140)
GLUCOSE SERPL-MCNC: 253 MG/DL (ref 65–140)
GLUCOSE SERPL-MCNC: 296 MG/DL (ref 65–140)
P AXIS: 27 DEGREES
POTASSIUM SERPL-SCNC: 4.2 MMOL/L (ref 3.5–5.3)
PR INTERVAL: 160 MS
QRS AXIS: -29 DEGREES
QRSD INTERVAL: 148 MS
QT INTERVAL: 458 MS
QTC INTERVAL: 468 MS
SODIUM SERPL-SCNC: 142 MMOL/L (ref 136–145)
T WAVE AXIS: 126 DEGREES
VENTRICULAR RATE: 63 BPM

## 2022-07-09 PROCEDURE — 93005 ELECTROCARDIOGRAM TRACING: CPT

## 2022-07-09 PROCEDURE — 93010 ELECTROCARDIOGRAM REPORT: CPT | Performed by: INTERNAL MEDICINE

## 2022-07-09 PROCEDURE — 80048 BASIC METABOLIC PNL TOTAL CA: CPT | Performed by: PHYSICIAN ASSISTANT

## 2022-07-09 PROCEDURE — 82948 REAGENT STRIP/BLOOD GLUCOSE: CPT

## 2022-07-09 PROCEDURE — 99232 SBSQ HOSP IP/OBS MODERATE 35: CPT | Performed by: INTERNAL MEDICINE

## 2022-07-09 PROCEDURE — 84484 ASSAY OF TROPONIN QUANT: CPT | Performed by: PHYSICIAN ASSISTANT

## 2022-07-09 PROCEDURE — 99232 SBSQ HOSP IP/OBS MODERATE 35: CPT | Performed by: PHYSICIAN ASSISTANT

## 2022-07-09 RX ORDER — LIDOCAINE 50 MG/G
1 PATCH TOPICAL DAILY
Status: DISCONTINUED | OUTPATIENT
Start: 2022-07-10 | End: 2022-07-19 | Stop reason: HOSPADM

## 2022-07-09 RX ADMIN — GABAPENTIN 400 MG: 400 CAPSULE ORAL at 12:01

## 2022-07-09 RX ADMIN — NITROGLYCERIN 0.4 MG: 0.4 TABLET SUBLINGUAL at 19:30

## 2022-07-09 RX ADMIN — HYDROCODONE BITARTRATE AND ACETAMINOPHEN 1 TABLET: 5; 325 TABLET ORAL at 09:00

## 2022-07-09 RX ADMIN — ASPIRIN 81 MG: 81 TABLET, COATED ORAL at 08:52

## 2022-07-09 RX ADMIN — LEVOTHYROXINE SODIUM 50 MCG: 50 TABLET ORAL at 05:28

## 2022-07-09 RX ADMIN — INSULIN LISPRO 6 UNITS: 100 INJECTION, SOLUTION INTRAVENOUS; SUBCUTANEOUS at 21:44

## 2022-07-09 RX ADMIN — GABAPENTIN 400 MG: 400 CAPSULE ORAL at 17:11

## 2022-07-09 RX ADMIN — SENNOSIDES 8.6 MG: 8.6 TABLET, FILM COATED ORAL at 08:53

## 2022-07-09 RX ADMIN — INSULIN LISPRO 2 UNITS: 100 INJECTION, SOLUTION INTRAVENOUS; SUBCUTANEOUS at 08:51

## 2022-07-09 RX ADMIN — INSULIN LISPRO 4 UNITS: 100 INJECTION, SOLUTION INTRAVENOUS; SUBCUTANEOUS at 17:15

## 2022-07-09 RX ADMIN — PANTOPRAZOLE SODIUM 40 MG: 40 TABLET, DELAYED RELEASE ORAL at 08:52

## 2022-07-09 RX ADMIN — CLOPIDOGREL BISULFATE 75 MG: 75 TABLET ORAL at 08:52

## 2022-07-09 RX ADMIN — Medication 0.5 MG/HR: at 18:23

## 2022-07-09 RX ADMIN — NYSTATIN: 100000 POWDER TOPICAL at 08:51

## 2022-07-09 RX ADMIN — POTASSIUM CHLORIDE 40 MEQ: 1500 TABLET, EXTENDED RELEASE ORAL at 08:53

## 2022-07-09 RX ADMIN — ATORVASTATIN CALCIUM 40 MG: 40 TABLET, FILM COATED ORAL at 08:53

## 2022-07-09 RX ADMIN — BISOPROLOL FUMARATE 2.5 MG: 5 TABLET ORAL at 08:52

## 2022-07-09 RX ADMIN — HEPARIN SODIUM 5000 UNITS: 5000 INJECTION INTRAVENOUS; SUBCUTANEOUS at 21:38

## 2022-07-09 RX ADMIN — CITALOPRAM HYDROBROMIDE 40 MG: 20 TABLET ORAL at 08:52

## 2022-07-09 RX ADMIN — HYDROCODONE BITARTRATE AND ACETAMINOPHEN 1 TABLET: 5; 325 TABLET ORAL at 21:39

## 2022-07-09 RX ADMIN — HYDROCODONE BITARTRATE AND ACETAMINOPHEN 1 TABLET: 5; 325 TABLET ORAL at 17:11

## 2022-07-09 RX ADMIN — Medication 1 MG/HR: at 02:16

## 2022-07-09 RX ADMIN — OLANZAPINE 5 MG: 5 TABLET, FILM COATED ORAL at 21:38

## 2022-07-09 RX ADMIN — INSULIN LISPRO 6 UNITS: 100 INJECTION, SOLUTION INTRAVENOUS; SUBCUTANEOUS at 12:02

## 2022-07-09 RX ADMIN — HEPARIN SODIUM 5000 UNITS: 5000 INJECTION INTRAVENOUS; SUBCUTANEOUS at 05:28

## 2022-07-09 RX ADMIN — PANTOPRAZOLE SODIUM 40 MG: 40 TABLET, DELAYED RELEASE ORAL at 17:11

## 2022-07-09 RX ADMIN — HEPARIN SODIUM 5000 UNITS: 5000 INJECTION INTRAVENOUS; SUBCUTANEOUS at 14:55

## 2022-07-09 RX ADMIN — ISOSORBIDE MONONITRATE 60 MG: 60 TABLET, EXTENDED RELEASE ORAL at 08:52

## 2022-07-09 RX ADMIN — ACETAMINOPHEN 650 MG: 325 TABLET, FILM COATED ORAL at 08:52

## 2022-07-09 RX ADMIN — AMLODIPINE BESYLATE 10 MG: 10 TABLET ORAL at 08:52

## 2022-07-09 RX ADMIN — GABAPENTIN 400 MG: 400 CAPSULE ORAL at 21:38

## 2022-07-09 RX ADMIN — ALLOPURINOL 300 MG: 100 TABLET ORAL at 08:52

## 2022-07-09 RX ADMIN — GABAPENTIN 400 MG: 400 CAPSULE ORAL at 08:53

## 2022-07-09 NOTE — ASSESSMENT & PLAN NOTE
Wt Readings from Last 3 Encounters:   07/09/22 126 kg (278 lb 10 6 oz)   07/06/22 136 kg (300 lb)   07/02/22 128 kg (281 lb 12 oz)     With a history of combined systolic and diastolic CHF, most recent echocardiogram revealing ejection fraction of 30%    · Patient recently admitted and underwent cardiac catheterization on 07/01 with PCI to the mid circumflex artery  · Followed up with Cardiology today, noted 20 lb weight gain since discharge 4 days ago  · Has been compliant with medications, maintained on Demadex 40 mg daily as an outpatient  · BNP elevated at 14,930  · Baseline weight approximately 280  · Weight on arrival 298, down to 278 today   · Monitor daily weights, monitor I's and O's  · Continue Bumex drip per cardiology however will decrease rate to 0 5 mg an hour

## 2022-07-09 NOTE — ASSESSMENT & PLAN NOTE
Lab Results   Component Value Date    EGFR 16 07/09/2022    EGFR 16 07/08/2022    EGFR 19 07/07/2022    CREATININE 2 97 (H) 07/09/2022    CREATININE 2 98 (H) 07/08/2022    CREATININE 2 65 (H) 07/07/2022     · Creatinine slightly worsening today to 2 98  · Continue monitoring while on IV diuretics   · Suspect that elevation in creatinine likely secondary to cardiorenal syndrome in the setting of severe volume overload

## 2022-07-09 NOTE — PLAN OF CARE
Problem: PAIN - ADULT  Goal: Verbalizes/displays adequate comfort level or baseline comfort level  Description: Interventions:  - Encourage patient to monitor pain and request assistance  - Assess pain using appropriate pain scale  - Administer analgesics based on type and severity of pain and evaluate response  - Implement non-pharmacological measures as appropriate and evaluate response  - Consider cultural and social influences on pain and pain management  - Notify physician/advanced practitioner if interventions unsuccessful or patient reports new pain  Outcome: Progressing     Problem: SAFETY ADULT  Goal: Patient will remain free of falls  Description: INTERVENTIONS:  - Educate patient/family on patient safety including physical limitations  - Instruct patient to call for assistance with activity   - Consult OT/PT to assist with strengthening/mobility   - Keep Call bell within reach  - Keep bed low and locked with side rails adjusted as appropriate  - Keep care items and personal belongings within reach  - Initiate and maintain comfort rounds  - Make Fall Risk Sign visible to staff  - Offer Toileting every  2 Hours, in advance of need  - Obtain necessary fall risk management equipmen  - Apply yellow socks and bracelet for high fall risk patients  - Consider moving patient to room near nurses station  Outcome: Progressing  Goal: Maintain or return to baseline ADL function  Description: INTERVENTIONS:  -  Assess patient's ability to carry out ADLs; assess patient's baseline for ADL function and identify physical deficits which impact ability to perform ADLs (bathing, care of mouth/teeth, toileting, grooming, dressing, etc )  - Assess/evaluate cause of self-care deficits   - Assess range of motion  - Assess patient's mobility; develop plan if impaired  - Assess patient's need for assistive devices and provide as appropriate  - Encourage maximum independence but intervene and supervise when necessary  - Involve family in performance of ADLs  - Assess for home care needs following discharge   - Consider OT consult to assist with ADL evaluation and planning for discharge  - Provide patient education as appropriate  Outcome: Progressing  Goal: Maintains/Returns to pre admission functional level  Description: INTERVENTIONS:  - Perform BMAT or MOVE assessment daily    - Set and communicate daily mobility goal to care team and patient/family/caregiver  - Collaborate with rehabilitation services on mobility goals if consulted  - Ambulate patient 3 times a day  - Out of bed to chair 3  times a day   - Out of bed for meals 3 times a day  - Out of bed for toileting  - Record patient progress and toleration of activity level   Outcome: Progressing     Problem: DISCHARGE PLANNING  Goal: Discharge to home or other facility with appropriate resources  Description: INTERVENTIONS:  - Identify barriers to discharge w/patient and caregiver  - Arrange for needed discharge resources and transportation as appropriate  - Identify discharge learning needs (meds, wound care, etc )  - Arrange for interpretive services to assist at discharge as needed  - Refer to Case Management Department for coordinating discharge planning if the patient needs post-hospital services based on physician/advanced practitioner order or complex needs related to functional status, cognitive ability, or social support system  Outcome: Progressing     Problem: Knowledge Deficit  Goal: Patient/family/caregiver demonstrates understanding of disease process, treatment plan, medications, and discharge instructions  Description: Complete learning assessment and assess knowledge base    Interventions:  - Provide teaching at level of understanding  - Provide teaching via preferred learning methods  Outcome: Progressing     Problem: CARDIOVASCULAR - ADULT  Goal: Maintains optimal cardiac output and hemodynamic stability  Description: INTERVENTIONS:  - Monitor I/O, vital signs and rhythm  - Monitor for S/S and trends of decreased cardiac output  - Administer and titrate ordered vasoactive medications to optimize hemodynamic stability  - Assess quality of pulses, skin color and temperature  - Assess for signs of decreased coronary artery perfusion  - Instruct patient to report change in severity of symptoms  Outcome: Progressing  Goal: Absence of cardiac dysrhythmias or at baseline rhythm  Description: INTERVENTIONS:  - Continuous cardiac monitoring, vital signs, obtain 12 lead EKG if ordered  - Administer antiarrhythmic and heart rate control medications as ordered  - Monitor electrolytes and administer replacement therapy as ordered  Outcome: Progressing     Problem: SKIN/TISSUE INTEGRITY - ADULT  Goal: Skin Integrity remains intact(Skin Breakdown Prevention)  Description: Assess:  -Perform [place] assessment twice day  -Clean and moisturize skin every day    Outcome: Progressing  Goal: Incision(s), wounds(s) or drain site(s) healing without S/S of infection  Description: INTERVENTIONS  - Assess and document dressing, incision, wound bed, drain sites and surrounding tissue  - Provide patient and family education    Outcome: Progressing     Problem: Potential for Falls  Goal: Patient will remain free of falls  Description: INTERVENTIONS:  - Educate patient/family on patient safety including physical limitations  - Instruct patient to call for assistance with activity   - Consult OT/PT to assist with strengthening/mobility   - Keep Call bell within reach  - Keep bed low and locked with side rails adjusted as appropriate  - Keep care items and personal belongings within reach  - Initiate and maintain comfort rounds  - Make Fall Risk Sign visible to staff  - Offer Toileting every  2 Hours, in advance of need  - Obtain necessary fall risk management equipmen  - Apply yellow socks and bracelet for high fall risk patients  - Consider moving patient to room near nurses station  Outcome: Progressing     Problem: Prexisting or High Potential for Compromised Skin Integrity  Goal: Skin integrity is maintained or improved  Description: INTERVENTIONS:  - Identify patients at risk for skin breakdown  - Assess and monitor skin integrity  - Assess and monitor nutrition and hydration status  - Monitor labs   - Assess for incontinence   - Turn and reposition patient  - Assist with mobility/ambulation  - Relieve pressure over bony prominences  - Avoid friction and shearing  - Provide appropriate hygiene as needed including keeping skin clean and dry  - Evaluate need for skin moisturizer/barrier cream  - Collaborate with interdisciplinary team   - Patient/family teaching  - Consider wound care consult   Outcome: Progressing     Problem: MOBILITY - ADULT  Goal: Maintain or return to baseline ADL function  Description: INTERVENTIONS:  -  Assess patient's ability to carry out ADLs; assess patient's baseline for ADL function and identify physical deficits which impact ability to perform ADLs (bathing, care of mouth/teeth, toileting, grooming, dressing, etc )  - Assess/evaluate cause of self-care deficits   - Assess range of motion  - Assess patient's mobility; develop plan if impaired  - Assess patient's need for assistive devices and provide as appropriate  - Encourage maximum independence but intervene and supervise when necessary  - Involve family in performance of ADLs  - Assess for home care needs following discharge   - Consider OT consult to assist with ADL evaluation and planning for discharge  - Provide patient education as appropriate  Outcome: Progressing  Goal: Maintains/Returns to pre admission functional level  Description: INTERVENTIONS:  - Perform BMAT or MOVE assessment daily    - Set and communicate daily mobility goal to care team and patient/family/caregiver     - Collaborate with rehabilitation services on mobility goals if consulted  - Ambulate patient 3 times a day  - Out of bed to chair 3 times a day   - Out of bed for meals 3 times a day  - Out of bed for toileting  - Record patient progress and toleration of activity level   Outcome: Progressing     Problem: INFECTION - ADULT  Goal: Absence or prevention of progression during hospitalization  Description: INTERVENTIONS:  - Assess and monitor for signs and symptoms of infection  - Monitor lab/diagnostic results  - Monitor all insertion sites, i e  indwelling lines, tubes, and drains  - Monitor endotracheal if appropriate and nasal secretions for changes in amount and color  - [place] appropriate cooling/warming therapies per order  - Administer medications as ordered  - Instruct and encourage patient and family to use good hand hygiene technique  - Identify and instruct in appropriate isolation precautions for identified infection/condition  Outcome: Completed

## 2022-07-09 NOTE — PROGRESS NOTES
Notified Ashlyn DE DIOS of patient's troponin result  Patient states CP unchanged  Per Ashlyn DE DIOS, lidocaine patch ordered for L shoulder and to administer patient's PRN nitro dose  Will alert oncoming nurse of same

## 2022-07-09 NOTE — ASSESSMENT & PLAN NOTE
Lab Results   Component Value Date    HGBA1C 7 3 (A) 03/29/2022       Recent Labs     07/08/22  1617 07/08/22  2105 07/09/22  0728 07/09/22  1118   POCGLU 219* 191* 192* 253*       Blood Sugar Average: Last 72 hrs:  (P) 135 [id number]   Patient is maintained on Lantus 50 units b i d  And scheduled Humalog 20 units t i d  With associated sliding scale    · with hypoglycemia during admission  · Will re-initiate basal insulin at bedtime starting with 10 units HS  · Sliding scale insulin a c  HS  · Hypoglycemia protocol

## 2022-07-09 NOTE — PROGRESS NOTES
Cardiology         Progress Note - Cardiology   Erika Cherry 63 y o  female MRN: [id number]  Unit/Bed#: [place] 2 -01 Encounter: [id number]          Assessment/Recommendations/Discussion:   · Acute on chronic combined systolic and diastolic heart failure  ? LVEF 38%, moderate LVH, mild LA dilated, AV sclerosis, trace AR, mild MR, MV sclerosis, mild TR, June 2022  · CAD with multiple PCIs  ? NSTEMI s/p JANET-mLCx w/ small 95% pRCA and diffuse moderate disease of LAD, July 2022  ? s/p -pRCA, August 2021  ? s/p JANET-mLAD, JANET-D1 and JANET-LCx, December 2012  · Recent Acute renal failure on chronic kidney disease  · Recent acute enteritis  · Ischemic cardiomyopathy with moderate to markedly reduced LV systolic function  · Dyslipidemia  · Morbid obesity  · CKD stage 4  · Anemia of chronic kidney disease  · Type 2 diabetes mellitus  · PCOS  · Fibromyalgia  · Neurogenic bladder with suprapubic catheter in place  · Obstructive sleep apnea, uncorrected  · Syncope with orthostatic hypotension  · Mild pulmonary hypertension      · Excellent urine output on Bumex infusion  Will reduce rate to 0 5 mg an hour  Patient states edema improved by about 80%  Weight seems almost at baseline    · Continue aspirin, clopidogrel, statin  · Continue amlodipine and isosorbide for hypertension which is well controlled          Subjective:  Patient seen and examined, feels better today                Physical Exam:  GEN:  NAD  HEENT:  MMM, NCAT, pink conjunctiva, EOMI, nonicteric sclera  CV:  NO JVD/HJR, RR, NO M/R/G, +S1/S2, NO PARASTERNAL HEAVE/THRILL, ++LE EDEMA, NO HEPATIC SYSTOLIC PULSATION, WARM EXTREMITIES  RESP:  CTAB/L  ABD:  SOFT, NT, NO GROSS ORGANOMEGALY        Vitals:   /59 (BP Location: Right arm)   Pulse 63   Temp 97 9 °F (36 6 °C) (Oral)   Resp 15   Ht 5' 8" (1 727 m)   Wt 126 kg (278 lb 10 6 oz)   SpO2 96%   BMI 42 37 kg/m²   Vitals:    07/08/22 0600 07/09/22 0557   Weight: 129 kg (283 lb 8 2 oz) 126 kg (278 lb 10 6 oz)       Intake/Output Summary (Last 24 hours) at 7/9/2022 1233  Last data filed at 7/9/2022 0911  Gross per 24 hour   Intake 1060 4 ml   Output 8000 ml   Net -6939 6 ml       TELEMETRY:  Sinus  Lab Results:  Results from last 7 days   Lab Units 07/08/22  0536   WBC Thousand/uL 6 96   HEMOGLOBIN g/dL 7 9*   HEMATOCRIT % 25 5*   PLATELETS Thousands/uL 283     Results from last 7 days   Lab Units 07/09/22  0506 07/07/22  0507 07/06/22  1648   POTASSIUM mmol/L 4 2   < > 4 1   CHLORIDE mmol/L 104   < > 107   CO2 mmol/L 28   < > 24   BUN mg/dL 73*   < > 64*   CREATININE mg/dL 2 97*   < > 2 94*   CALCIUM mg/dL 7 9*   < > 8 0*   ALK PHOS U/L  --   --  113   ALT U/L  --   --  30   AST U/L  --   --  11    < > = values in this interval not displayed       Results from last 7 days   Lab Units 07/09/22  0506   POTASSIUM mmol/L 4 2   CHLORIDE mmol/L 104   CO2 mmol/L 28   BUN mg/dL 73*   CREATININE mg/dL 2 97*   CALCIUM mg/dL 7 9*           Medications:    Current Facility-Administered Medications:     acetaminophen (TYLENOL) tablet 650 mg, 650 mg, Oral, Q6H PRN, [facility], PA-C, 650 mg at 07/09/22 5899    allopurinol (ZYLOPRIM) tablet 300 mg, 300 mg, Oral, Daily, [facility], PA-C, 300 mg at 07/09/22 6387    amLODIPine (NORVASC) tablet 10 mg, 10 mg, Oral, Daily, [facility], PA-C, 10 mg at 07/09/22 3587    aspirin (ECOTRIN LOW STRENGTH) EC tablet 81 mg, 81 mg, Oral, Daily, [facility], PA-C, 81 mg at 07/09/22 8806    atorvastatin (LIPITOR) tablet 40 mg, 40 mg, Oral, Daily, [facility], PA-C, 40 mg at 07/09/22 0853    bisoprolol (ZEBETA) tablet 2 5 mg, 2 5 mg, Oral, Daily, Glen Dubin, MD, 2 5 mg at 07/09/22 0852    bumetanide (BUMEX) 12 5 mg infusion 50 mL, 0 5 mg/hr, Intravenous, Continuous, Arnaud Castrejon DO, Last Rate: 2 mL/hr at 07/09/22 1201, 0 5 mg/hr at 07/09/22 1201    citalopram (CeleXA) tablet 40 mg, 40 mg, Oral, Daily, [facility], PA-PONCHO, 40 mg at 07/09/22 4736    clopidogrel (PLAVIX) tablet 75 mg, 75 mg, Oral, Daily, Beryle Meneses, PA-C, 75 mg at 07/09/22 3220    gabapentin (NEURONTIN) capsule 400 mg, 400 mg, Oral, 4x Daily, Beryle Meneses, PA-C, 400 mg at 07/09/22 1201    heparin (porcine) subcutaneous injection 5,000 Units, 5,000 Units, Subcutaneous, Q8H [facility], 5,000 Units at 07/09/22 0528 **AND** [CANCELED] Platelet count, , , Once, Beryle Meneses, PA-C    HYDROcodone-acetaminophen [facility]) 5-325 mg per tablet 1 tablet, 1 tablet, Oral, TID PRN, Beryle Meneses, PA-C, 1 tablet at 07/09/22 0900    insulin lispro (HumaLOG) 100 units/mL subcutaneous injection 2-12 Units, 2-12 Units, Subcutaneous, HS, Beryle Gideon, PA-C, 2 Units at 07/08/22 2109    insulin lispro (HumaLOG) 100 units/mL subcutaneous injection 2-12 Units, 2-12 Units, Subcutaneous, TID AC, 6 Units at 07/09/22 1202 **AND** Fingerstick Glucose (POCT), , , 4x Daily AC and at bedtime, Burak Bentley MD    isosorbide mononitrate (IMDUR) 24 hr tablet 60 mg, 60 mg, Oral, Daily, Beryle Meneses, PA-C, 60 mg at 07/09/22 8310    levothyroxine tablet 50 mcg, 50 mcg, Oral, Early Morning, Beryle Meneses, PA-C, 50 mcg at 07/09/22 0528    nitroglycerin (NITROSTAT) SL tablet 0 4 mg, 0 4 mg, Sublingual, Q5 Min PRN, Beryle Meneses, PA-C    nystatin (MYCOSTATIN) powder, , Topical, BID, Beryle Meneses, PA-C, Given at 07/09/22 0851    OLANZapine (ZyPREXA) tablet 5 mg, 5 mg, Oral, HS, Beryle Meneses, PA-C, 5 mg at 07/08/22 2109    ondansetron (ZOFRAN) injection 4 mg, 4 mg, Intravenous, Q6H PRN, Beryle Meneses, PA-C    pantoprazole (PROTONIX) EC tablet 40 mg, 40 mg, Oral, BID AC, Beryle Meneses, PA-C, 40 mg at 07/09/22 7936    potassium chloride (K-DUR,KLOR-CON) CR tablet 40 mEq, 40 mEq, Oral, Daily, Beryle Meneses, PA-C, 40 mEq at 07/09/22 0853    senna (SENOKOT) tablet 8 6 mg, 1 tablet, Oral, Daily, Beryle Meneses, PA-C, 8 6 mg at 07/09/22 0853    tiZANidine (ZANAFLEX) tablet 4 mg, 4 mg, Oral, Q8H PRN, Beryle Meneses, PA-C, 4 mg at 07/08/22 8962    This note was completed in part utilizing M-[employer] Direct Software  Grammatical errors, random word insertions, spelling mistakes, and incomplete sentences may be an occasional consequence of this system secondary to software limitations, ambient noise, and hardware issues  If you have any questions or concerns about the content, text, or information contained within the body of this dictation, please contact the provider for clarification

## 2022-07-09 NOTE — PROGRESS NOTES
[address]  Progress Note - Kartik Plata 1960, 63 y o  female MRN: [id number]  Unit/Bed#: [place] [address] [address] 206-01 Encounter: [id number]  Primary Care Provider: CHERELLE Myrick   Date and time admitted to hospital: 7/6/2022  4:59 PM    * 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 heart failure (HCC)  Assessment & Plan  Wt Readings from Last 3 Encounters:   07/09/22 126 kg (278 lb 10 6 oz)   07/06/22 136 kg (300 lb)   07/02/22 128 kg (281 lb 12 oz)     With a history of combined systolic and diastolic CHF, most recent echocardiogram revealing ejection fraction of 30%    · Patient recently admitted and underwent cardiac catheterization on 07/01 with PCI to the mid circumflex artery  · Followed up with Cardiology today, noted 20 lb weight gain since discharge 4 days ago  · Has been compliant with medications, maintained on Demadex 40 mg daily as an outpatient  · BNP elevated at 14,930  · Baseline weight approximately 280  · Weight on arrival 298, down to 278 today   · Monitor daily weights, monitor I's and O's  · Continue Bumex drip per cardiology however will decrease rate to 0 5 mg an hour        NSVT (nonsustained ventricular tachycardia) ([facility] [address] )  Assessment & Plan  Patient was noted for an 8 beat V tach on telemetry, asymptomatic  · Beta blocker was on hold with prior episode of bradycardia, will resume  · Magnesium normal  · Continue telemetry monitoring , no other events    Mixed hyperlipidemia  Assessment & Plan  · Continue Lipitor daily     Acquired hypothyroidism  Assessment & Plan  · Continue Synthroid 50 mcg daily    Stage 4 chronic kidney disease [facility])  Assessment & Plan  Lab Results   Component Value Date    EGFR 16 07/09/2022    EGFR 16 07/08/2022    EGFR 19 07/07/2022    CREATININE 2 97 (H) 07/09/2022    CREATININE 2 98 (H) 07/08/2022    CREATININE 2 65 (H) 07/07/2022     · Creatinine slightly worsening today to 2 98  · Continue monitoring while on IV diuretics   · Suspect that elevation in creatinine likely secondary to cardiorenal syndrome in the setting of severe volume overload    Anemia  Assessment & Plan  · With history of anemia of chronic disease  · Hemoglobin stable at baseline    CAD (coronary artery disease)  Assessment & Plan  Status post stenting in 2012, then revised in 2017  · Known chronic total occlusion of RCA  · Most recent cardiac catheterization on 07/01 with PCI to mid circumflex  · Continue is Plavix, Lipitor, aspirin, Imdur, bisoprolol    Type 2 diabetes mellitus with stage 4 chronic kidney disease, with long-term current use of insulin [facility])  Assessment & Plan  Lab Results   Component Value Date    HGBA1C 7 3 (A) 03/29/2022       Recent Labs     07/08/22  1617 07/08/22  2105 07/09/22  0728 07/09/22  1118   POCGLU 219* 191* 192* 253*       Blood Sugar Average: Last 72 hrs:  (P) 135 [id number]   Patient is maintained on Lantus 50 units b i d  And scheduled Humalog 20 units t i d  With associated sliding scale  · with hypoglycemia during admission  · Will re-initiate basal insulin at bedtime starting with 10 units HS  · Sliding scale insulin a c  HS  · Hypoglycemia protocol        VTE Pharmacologic Prophylaxis:   Moderate Risk (Score 3-4) - Pharmacological DVT Prophylaxis Ordered: heparin  Patient Centered Rounds: I performed bedside rounds with nursing staff today  Discussions with Specialists or Other Care Team Provider:  Cardiology    Education and Discussions with Family / Patient: Patient declined call to   Time Spent for Care: 15 minutes  More than 50% of total time spent on counseling and coordination of care as described above  Current Length of Stay: 3 day(s)  Current Patient Status: Inpatient   Certification Statement: The patient will continue to require additional inpatient hospital stay due to Bumex drip  Discharge Plan: Anticipate discharge in 24-48 hrs to home      Code Status: Level 1 - Full Code    Subjective:   Patient is seen resting in bed  She offers no complaints at this time  States she is feeling very well  Denies any shortness of breath or chest pain  Objective:     Vitals:   Temp (24hrs), Av 9 °F (36 6 °C), Min:97 6 °F (36 4 °C), Max:98 1 °F (36 7 °C)    Temp:  [97 6 °F (36 4 °C)-98 1 °F (36 7 °C)] 97 9 °F (36 6 °C)  HR:  [60-75] 63  Resp:  [15-22] 15  BP: (118-160)/(59-83) 118/59  SpO2:  [90 %-96 %] 96 %  Body mass index is 42 37 kg/m²  Input and Output Summary (last 24 hours): Intake/Output Summary (Last 24 hours) at 2022 1253  Last data filed at 2022 0911  Gross per 24 hour   Intake 1240 4 ml   Output 8000 ml   Net -6759 6 ml       Physical Exam:   Physical Exam  Vitals and nursing note reviewed  Constitutional:       General: She is not in acute distress  Appearance: Normal appearance  She is obese  She is not ill-appearing, toxic-appearing or diaphoretic  Eyes:      General: No scleral icterus  Conjunctiva/sclera: Conjunctivae normal    Cardiovascular:      Rate and Rhythm: Normal rate and regular rhythm  Heart sounds: No murmur heard  No friction rub  No gallop  Pulmonary:      Effort: Pulmonary effort is normal  No respiratory distress  Breath sounds: Normal breath sounds  No stridor  No wheezing, rhonchi or rales  Chest:      Chest wall: No tenderness  Abdominal:      General: Abdomen is flat  Bowel sounds are normal  There is no distension  Palpations: Abdomen is soft  Tenderness: There is no abdominal tenderness  Musculoskeletal:      Right lower leg: Edema present  Left lower leg: Edema present  Skin:     General: Skin is warm and dry  Coloration: Skin is not jaundiced or pale  Neurological:      General: No focal deficit present  Mental Status: She is alert and oriented to person, place, and time  Mental status is at baseline            Additional Data:     Labs:  Results from last 7 days   Lab Units 07/08/22  0536 07/07/22  0507   WBC Thousand/uL 6 96 7 43   HEMOGLOBIN g/dL 7 9* 7 6*   HEMATOCRIT % 25 5* 24 6*   PLATELETS Thousands/uL 283 298   NEUTROS PCT %  --  62   LYMPHS PCT %  --  25   MONOS PCT %  --  9   EOS PCT %  --  3     Results from last 7 days   Lab Units 07/09/22  0506 07/07/22  0507 07/06/22  1648   SODIUM mmol/L 142   < > 146*   POTASSIUM mmol/L 4 2   < > 4 1   CHLORIDE mmol/L 104   < > 107   CO2 mmol/L 28   < > 24   BUN mg/dL 73*   < > 64*   CREATININE mg/dL 2 97*   < > 2 94*   ANION GAP mmol/L 10   < > 15*   CALCIUM mg/dL 7 9*   < > 8 0*   ALBUMIN g/dL  --   --  2 9*   TOTAL BILIRUBIN mg/dL  --   --  0 20   ALK PHOS U/L  --   --  113   ALT U/L  --   --  30   AST U/L  --   --  11   GLUCOSE RANDOM mg/dL 219*   < > 46*    < > = values in this interval not displayed       Results from last 7 days   Lab Units 07/06/22  1648   INR  1 05     Results from last 7 days   Lab Units 07/09/22  1118 07/09/22  0728 07/08/22  2105 07/08/22  1617 07/08/22  1121 07/08/22  0751 07/07/22  2056 07/07/22  1600 07/07/22  1056 07/07/22  0828 07/07/22  0723 07/06/22  2051   POC GLUCOSE mg/dl 253* 192* 191* 219* 178* 131 192* 195* 149* 119 47* 121               Lines/Drains:  Invasive Devices  Report    Peripheral Intravenous Line  Duration           Peripheral IV 07/06/22 Left Antecubital 2 days          Drain  Duration           Suprapubic Catheter Non-latex 16 Fr  275 days                  Telemetry:  Telemetry Orders (From admission, onward)             48 Hour Telemetry Monitoring  (ED Bridging Orders Panel)  Continuous x 48 hours        References:    Telemetry Guidelines   Question:  Reason for 48 Hour Telemetry  Answer:  Acute Decompensated CHF (continuous diuretic infusion or total diuretic dose > 200 mg daily, associated electrolyte derangement, ionotropic drip, history of ventricular arrhythmia, or new EF <35%)                 Telemetry Reviewed: Normal Sinus Rhythm  Indication for Continued Telemetry Use: Acute CHF on >200 mg lasix/day or equivalent dose or with new reduced EF  Imaging: No pertinent imaging reviewed  Recent Cultures (last 7 days):         Last 24 Hours Medication List:   Current Facility-Administered Medications   Medication Dose Route Frequency Provider Last Rate    acetaminophen  650 mg Oral Q6H PRN Fermin Azul PA-C      allopurinol  300 mg Oral Daily Fermin Azul PA-C      amLODIPine  10 mg Oral Daily [place], [place]      aspirin  81 mg Oral Daily [place], [place]      atorvastatin  40 mg Oral Daily Fermin Azul PA-C      bisoprolol  2 5 mg Oral Daily Reggie Flowers MD      bumetanide  0 5 mg/hr Intravenous Continuous Rujul Castrejon, DO 0 5 mg/hr (07/09/22 1201)    citalopram  40 mg Oral Daily Fermin Azul PA-C      clopidogrel  75 mg Oral Daily [place], [place]      gabapentin  400 mg Oral 4x Daily Fermin Azul PA-C      heparin (porcine)  5,000 Units Subcutaneous [place], [place]      HYDROcodone-acetaminophen  1 tablet Oral TID PRN Fermin Azul PA-C      insulin lispro  2-12 Units Subcutaneous HS Fermin Azul PA-C      insulin lispro  2-12 Units Subcutaneous TID AC Reggie Flowers MD      isosorbide mononitrate  60 mg Oral Daily [place], [place]      levothyroxine  50 mcg Oral Early Morning [place], [place]      nitroglycerin  0 4 mg Sublingual Q5 Min PRN Fermin Azul PA-C      nystatin   Topical BID [place], [place]      OLANZapine  5 mg Oral HS Fermin SETH Azul      ondansetron  4 mg Intravenous Q6H PRN Fermin Azul PA-C      pantoprazole  40 mg Oral BID AC [place], [place]      potassium chloride  40 mEq Oral Daily [place], [place]      senna  1 tablet Oral Daily [place], [place]      tiZANidine  4 mg Oral Q8H PRN Fermin Azul PA-C          Today, Patient Was Seen By: Fermin Azul PA-C    **Please Note: This note may have been constructed using a voice recognition system  **

## 2022-07-09 NOTE — PROGRESS NOTES
Patient c/o L sided CP radiating into posterior L shoulder blade  States it feels "achey" and onset about 30 minutes prior  Patient states she thought she felt "fluttering" but currently denies palpitations or flutters  See flowsheet for stable VS  Telemetru continues to read NSR with BBB  No acute events except artifact noted  Patient remains on bumex gtt  Nanda Ren PA-C notified of same  EKG obtained - reads NSR with LBBB  Will monitor

## 2022-07-09 NOTE — ASSESSMENT & PLAN NOTE
Patient was noted for an 8 beat V tach on telemetry, asymptomatic  · Beta blocker was on hold with prior episode of bradycardia, will resume  · Magnesium normal  · Continue telemetry monitoring , no other events

## 2022-07-10 LAB
ANION GAP SERPL CALCULATED.3IONS-SCNC: 11 MMOL/L (ref 4–13)
BUN SERPL-MCNC: 76 MG/DL (ref 5–25)
CALCIUM SERPL-MCNC: 8.4 MG/DL (ref 8.3–10.1)
CHLORIDE SERPL-SCNC: 102 MMOL/L (ref 100–108)
CO2 SERPL-SCNC: 30 MMOL/L (ref 21–32)
CREAT SERPL-MCNC: 3.12 MG/DL (ref 0.6–1.3)
ERYTHROCYTE [DISTWIDTH] IN BLOOD BY AUTOMATED COUNT: 15.8 % (ref 11.6–15.1)
GFR SERPL CREATININE-BSD FRML MDRD: 15 ML/MIN/1.73SQ M
GLUCOSE SERPL-MCNC: 262 MG/DL (ref 65–140)
GLUCOSE SERPL-MCNC: 271 MG/DL (ref 65–140)
GLUCOSE SERPL-MCNC: 273 MG/DL (ref 65–140)
GLUCOSE SERPL-MCNC: 301 MG/DL (ref 65–140)
GLUCOSE SERPL-MCNC: 372 MG/DL (ref 65–140)
HCT VFR BLD AUTO: 28.7 % (ref 34.8–46.1)
HGB BLD-MCNC: 9 G/DL (ref 11.5–15.4)
MCH RBC QN AUTO: 32 PG (ref 26.8–34.3)
MCHC RBC AUTO-ENTMCNC: 31.4 G/DL (ref 31.4–37.4)
MCV RBC AUTO: 102 FL (ref 82–98)
PLATELET # BLD AUTO: 269 THOUSANDS/UL (ref 149–390)
PMV BLD AUTO: 10.2 FL (ref 8.9–12.7)
POTASSIUM SERPL-SCNC: 4.1 MMOL/L (ref 3.5–5.3)
RBC # BLD AUTO: 2.81 MILLION/UL (ref 3.81–5.12)
SODIUM SERPL-SCNC: 143 MMOL/L (ref 136–145)
WBC # BLD AUTO: 5 THOUSAND/UL (ref 4.31–10.16)

## 2022-07-10 PROCEDURE — 80048 BASIC METABOLIC PNL TOTAL CA: CPT | Performed by: PHYSICIAN ASSISTANT

## 2022-07-10 PROCEDURE — 85027 COMPLETE CBC AUTOMATED: CPT | Performed by: PHYSICIAN ASSISTANT

## 2022-07-10 PROCEDURE — 99232 SBSQ HOSP IP/OBS MODERATE 35: CPT | Performed by: INTERNAL MEDICINE

## 2022-07-10 PROCEDURE — 82948 REAGENT STRIP/BLOOD GLUCOSE: CPT

## 2022-07-10 PROCEDURE — 99232 SBSQ HOSP IP/OBS MODERATE 35: CPT | Performed by: PHYSICIAN ASSISTANT

## 2022-07-10 RX ORDER — TORSEMIDE 20 MG/1
40 TABLET ORAL
Status: DISCONTINUED | OUTPATIENT
Start: 2022-07-10 | End: 2022-07-12

## 2022-07-10 RX ORDER — INSULIN GLARGINE 100 [IU]/ML
10 INJECTION, SOLUTION SUBCUTANEOUS EVERY MORNING
Status: DISCONTINUED | OUTPATIENT
Start: 2022-07-10 | End: 2022-07-11

## 2022-07-10 RX ADMIN — INSULIN LISPRO 8 UNITS: 100 INJECTION, SOLUTION INTRAVENOUS; SUBCUTANEOUS at 12:18

## 2022-07-10 RX ADMIN — SENNOSIDES 8.6 MG: 8.6 TABLET, FILM COATED ORAL at 08:46

## 2022-07-10 RX ADMIN — OLANZAPINE 5 MG: 5 TABLET, FILM COATED ORAL at 21:11

## 2022-07-10 RX ADMIN — Medication 0.5 MG/HR: at 12:19

## 2022-07-10 RX ADMIN — AMLODIPINE BESYLATE 10 MG: 10 TABLET ORAL at 08:46

## 2022-07-10 RX ADMIN — POTASSIUM CHLORIDE 40 MEQ: 1500 TABLET, EXTENDED RELEASE ORAL at 08:46

## 2022-07-10 RX ADMIN — HYDROCODONE BITARTRATE AND ACETAMINOPHEN 1 TABLET: 5; 325 TABLET ORAL at 17:14

## 2022-07-10 RX ADMIN — TORSEMIDE 40 MG: 20 TABLET ORAL at 15:52

## 2022-07-10 RX ADMIN — HEPARIN SODIUM 5000 UNITS: 5000 INJECTION INTRAVENOUS; SUBCUTANEOUS at 15:52

## 2022-07-10 RX ADMIN — HEPARIN SODIUM 5000 UNITS: 5000 INJECTION INTRAVENOUS; SUBCUTANEOUS at 05:02

## 2022-07-10 RX ADMIN — INSULIN LISPRO 6 UNITS: 100 INJECTION, SOLUTION INTRAVENOUS; SUBCUTANEOUS at 17:15

## 2022-07-10 RX ADMIN — GABAPENTIN 400 MG: 400 CAPSULE ORAL at 08:46

## 2022-07-10 RX ADMIN — NYSTATIN: 100000 POWDER TOPICAL at 17:15

## 2022-07-10 RX ADMIN — GABAPENTIN 400 MG: 400 CAPSULE ORAL at 12:18

## 2022-07-10 RX ADMIN — INSULIN LISPRO 10 UNITS: 100 INJECTION, SOLUTION INTRAVENOUS; SUBCUTANEOUS at 21:11

## 2022-07-10 RX ADMIN — INSULIN LISPRO 6 UNITS: 100 INJECTION, SOLUTION INTRAVENOUS; SUBCUTANEOUS at 08:50

## 2022-07-10 RX ADMIN — ALLOPURINOL 300 MG: 100 TABLET ORAL at 08:45

## 2022-07-10 RX ADMIN — CITALOPRAM HYDROBROMIDE 40 MG: 20 TABLET ORAL at 08:46

## 2022-07-10 RX ADMIN — ATORVASTATIN CALCIUM 40 MG: 40 TABLET, FILM COATED ORAL at 08:45

## 2022-07-10 RX ADMIN — ASPIRIN 81 MG: 81 TABLET, COATED ORAL at 08:46

## 2022-07-10 RX ADMIN — BISOPROLOL FUMARATE 2.5 MG: 5 TABLET ORAL at 08:52

## 2022-07-10 RX ADMIN — CLOPIDOGREL BISULFATE 75 MG: 75 TABLET ORAL at 08:46

## 2022-07-10 RX ADMIN — PANTOPRAZOLE SODIUM 40 MG: 40 TABLET, DELAYED RELEASE ORAL at 08:49

## 2022-07-10 RX ADMIN — HYDROCODONE BITARTRATE AND ACETAMINOPHEN 1 TABLET: 5; 325 TABLET ORAL at 22:39

## 2022-07-10 RX ADMIN — INSULIN GLARGINE 10 UNITS: 100 INJECTION, SOLUTION SUBCUTANEOUS at 08:45

## 2022-07-10 RX ADMIN — GABAPENTIN 400 MG: 400 CAPSULE ORAL at 17:14

## 2022-07-10 RX ADMIN — GABAPENTIN 400 MG: 400 CAPSULE ORAL at 21:11

## 2022-07-10 RX ADMIN — HYDROCODONE BITARTRATE AND ACETAMINOPHEN 1 TABLET: 5; 325 TABLET ORAL at 08:50

## 2022-07-10 RX ADMIN — HEPARIN SODIUM 5000 UNITS: 5000 INJECTION INTRAVENOUS; SUBCUTANEOUS at 21:11

## 2022-07-10 RX ADMIN — ISOSORBIDE MONONITRATE 60 MG: 60 TABLET, EXTENDED RELEASE ORAL at 08:46

## 2022-07-10 RX ADMIN — LIDOCAINE 1 PATCH: 50 PATCH CUTANEOUS at 08:46

## 2022-07-10 RX ADMIN — NYSTATIN: 100000 POWDER TOPICAL at 08:45

## 2022-07-10 RX ADMIN — PANTOPRAZOLE SODIUM 40 MG: 40 TABLET, DELAYED RELEASE ORAL at 15:52

## 2022-07-10 RX ADMIN — LEVOTHYROXINE SODIUM 50 MCG: 50 TABLET ORAL at 05:02

## 2022-07-10 NOTE — ASSESSMENT & PLAN NOTE
Wt Readings from Last 3 Encounters:   07/10/22 126 kg (277 lb 1 9 oz)   07/06/22 136 kg (300 lb)   07/02/22 128 kg (281 lb 12 oz)     With a history of combined systolic and diastolic CHF, most recent echocardiogram revealing ejection fraction of 30%    · Patient recently admitted and underwent cardiac catheterization on 07/01 with PCI to the mid circumflex artery  · Followed up with Cardiology today, noted 20 lb weight gain since discharge 4 days ago  · Has been compliant with medications, maintained on Demadex 40 mg daily as an outpatient  · BNP elevated at 14,930  · Baseline weight approximately 280  · Weight on arrival 298, down to 277 today   · Monitor daily weights, monitor I's and O's  · Suspect likely discontinuation of Bumex drip today however will await formal recommendations Cardiology

## 2022-07-10 NOTE — PROGRESS NOTES
Cardiology         Progress Note - Cardiology   Cal Eaton 63 y o  female MRN: [id number]  Unit/Bed#: [place] [address] 2 -01 Encounter: [id number]          Assessment/Recommendations/Discussion:   · Acute on chronic combined systolic and diastolic heart failure  ? LVEF 38%, moderate LVH, mild LA dilated, AV sclerosis, trace AR, mild MR, MV sclerosis, mild TR, June 2022  · CAD with multiple PCIs  ? NSTEMI s/p JANET-mLCx w/ small 95% pRCA and diffuse moderate disease of LAD, July 2022  ? s/p -pRCA, August 2021  ? s/p JANET-mLAD, JANET-D1 and JANET-LCx, December 2012  · Recent Acute renal failure on chronic kidney disease  · Recent acute enteritis  · Ischemic cardiomyopathy with moderate to markedly reduced LV systolic function  · Dyslipidemia  · Morbid obesity  · CKD stage 4  · Anemia of chronic kidney disease  · Type 2 diabetes mellitus  · PCOS  · Fibromyalgia  · Neurogenic bladder with suprapubic catheter in place  · Obstructive sleep apnea, uncorrected  · Syncope with orthostatic hypotension  · Mild pulmonary hypertension      · Continue Bumex infusion    Will give torsemide 40 mg p o  B i d   · Continue aspirin, statin, clopidogrel  · Continue amlodipine and isosorbide for hypertension  · Recheck renal function tomorrow   · Discontinue telemetry            Subjective:  Patient seen and examined, feels better                Physical Exam:  GEN:  NAD  HEENT:  MMM, NCAT, pink conjunctiva, EOMI, nonicteric sclera  CV:  NO JVD/HJR, RR, NO M/R/G, +S1/S2, NO PARASTERNAL HEAVE/THRILL, 2+LE EDEMA, NO HEPATIC SYSTOLIC PULSATION, WARM EXTREMITIES  RESP:  CTAB/L  ABD:  SOFT, NT, NO GROSS ORGANOMEGALY        Vitals:   /71 (BP Location: Right arm)   Pulse 66   Temp (!) 97 2 °F (36 2 °C) (Oral)   Resp 17   Ht 5' 8" (1 727 m)   Wt 126 kg (277 lb 1 9 oz)   SpO2 95%   BMI 42 14 kg/m²   Vitals:    07/09/22 0557 07/10/22 0600   Weight: 126 kg (278 lb 10 6 oz) 126 kg (277 lb 1 9 oz)       Intake/Output Summary (Last 24 hours) at 7/10/2022 1224  Last data filed at 7/10/2022 0857  Gross per 24 hour   Intake 480 ml   Output 4750 ml   Net -4270 ml       TELEMETRY:  Sinus rhythm  Lab Results:  Results from last 7 days   Lab Units 07/10/22  0449   WBC Thousand/uL 5 00   HEMOGLOBIN g/dL 9 0*   HEMATOCRIT % 28 7*   PLATELETS Thousands/uL 269     Results from last 7 days   Lab Units 07/10/22  0449 07/07/22  0507 07/06/22  1648   POTASSIUM mmol/L 4 1   < > 4 1   CHLORIDE mmol/L 102   < > 107   CO2 mmol/L 30   < > 24   BUN mg/dL 76*   < > 64*   CREATININE mg/dL 3 12*   < > 2 94*   CALCIUM mg/dL 8 4   < > 8 0*   ALK PHOS U/L  --   --  113   ALT U/L  --   --  30   AST U/L  --   --  11    < > = values in this interval not displayed       Results from last 7 days   Lab Units 07/10/22  0449   POTASSIUM mmol/L 4 1   CHLORIDE mmol/L 102   CO2 mmol/L 30   BUN mg/dL 76*   CREATININE mg/dL 3 12*   CALCIUM mg/dL 8 4           Medications:    Current Facility-Administered Medications:     acetaminophen (TYLENOL) tablet 650 mg, 650 mg, Oral, Q6H PRN, Emilia Severe, PA-C, 650 mg at 07/09/22 4814    allopurinol (ZYLOPRIM) tablet 300 mg, 300 mg, Oral, Daily, Emilia Severe, PA-C, 300 mg at 07/10/22 0845    amLODIPine (NORVASC) tablet 10 mg, 10 mg, Oral, Daily, Emilia Severe, PA-C, 10 mg at 07/10/22 0846    aspirin (ECOTRIN LOW STRENGTH) EC tablet 81 mg, 81 mg, Oral, Daily, Emilia Severe, PA-C, 81 mg at 07/10/22 0846    atorvastatin (LIPITOR) tablet 40 mg, 40 mg, Oral, Daily, Emilia Severe, PA-C, 40 mg at 07/10/22 0845    bisoprolol (ZEBETA) tablet 2 5 mg, 2 5 mg, Oral, Daily, Bon Malhotra MD, 2 5 mg at 07/10/22 0852    bumetanide (BUMEX) 12 5 mg infusion 50 mL, 0 5 mg/hr, Intravenous, Continuous, Arnaud Castrejon DO, Last Rate: 2 mL/hr at 07/10/22 1219, 0 5 mg/hr at 07/10/22 1219    citalopram (CeleXA) tablet 40 mg, 40 mg, Oral, Daily, Frann Severe, PA-C, 40 mg at 07/10/22 0846    clopidogrel (PLAVIX) tablet 75 mg, 75 mg, Oral, Daily, Scottnn Severe, PA-C, 75 mg at 07/10/22 0846    gabapentin (NEURONTIN) capsule 400 mg, 400 mg, Oral, 4x Daily, Dirk Senate, PA-C, 400 mg at 07/10/22 1218    heparin (porcine) subcutaneous injection 5,000 Units, 5,000 Units, Subcutaneous, Q8H [address], 5,000 Units at 07/10/22 0502 **AND** [CANCELED] Platelet count, , , Once, Dirk Senate, PA-C    HYDROcodone-acetaminophen [facility]) 5-325 mg per tablet 1 tablet, 1 tablet, Oral, TID PRN, Dirk Senate, PA-C, 1 tablet at 07/10/22 0850    insulin glargine (LANTUS) subcutaneous injection 10 Units 0 1 mL, 10 Units, Subcutaneous, QAM, Dirk Senate, PA-C, 10 Units at 07/10/22 0845    insulin lispro (HumaLOG) 100 units/mL subcutaneous injection 2-12 Units, 2-12 Units, Subcutaneous, HS, Dirk Senate, PA-C, 6 Units at 07/09/22 2144    insulin lispro (HumaLOG) 100 units/mL subcutaneous injection 2-12 Units, 2-12 Units, Subcutaneous, TID AC, 8 Units at 07/10/22 1218 **AND** Fingerstick Glucose (POCT), , , 4x Daily AC and at bedtime, Ramsey Aguilar MD    isosorbide mononitrate (IMDUR) 24 hr tablet 60 mg, 60 mg, Oral, Daily, Dirk Senate, PA-C, 60 mg at 07/10/22 0846    levothyroxine tablet 50 mcg, 50 mcg, Oral, Early Morning, Dirk Senate, PA-C, 50 mcg at 07/10/22 0502    lidocaine (LIDODERM) 5 % patch 1 patch, 1 patch, Topical, Daily, Dirk Senate, PA-C, 1 patch at 07/10/22 0846    nitroglycerin (NITROSTAT) SL tablet 0 4 mg, 0 4 mg, Sublingual, Q5 Min PRN, Dirk Senate, PA-C, 0 4 mg at 07/09/22 1930    nystatin (MYCOSTATIN) powder, , Topical, BID, Dirk Senate, PA-C, Given at 07/10/22 0845    OLANZapine (ZyPREXA) tablet 5 mg, 5 mg, Oral, HS, Dirk Senate, PA-C, 5 mg at 07/09/22 2138    ondansetron (ZOFRAN) injection 4 mg, 4 mg, Intravenous, Q6H PRN, Dirk Senate, PA-C    pantoprazole (PROTONIX) EC tablet 40 mg, 40 mg, Oral, BID AC, Dirk Senate, PA-C, 40 mg at 07/10/22 0849    potassium chloride (K-DUR,KLOR-CON) CR tablet 40 mEq, 40 mEq, Oral, Daily, Dirk Senate, PA-C, 40 mEq at 07/10/22 0846    senna (SENOKOT) tablet 8 6 mg, 1 tablet, Oral, Daily, SETH [place], 8 6 mg at 07/10/22 0846    tiZANidine (ZANAFLEX) tablet 4 mg, 4 mg, Oral, Q8H PRN, SETH [place], 4 mg at 07/08/22 2225    This note was completed in part utilizing [employer] Software  Grammatical errors, random word insertions, spelling mistakes, and incomplete sentences may be an occasional consequence of this system secondary to software limitations, ambient noise, and hardware issues  If you have any questions or concerns about the content, text, or information contained within the body of this dictation, please contact the provider for clarification

## 2022-07-10 NOTE — PROGRESS NOTES
[address] [facility]  Progress Note - [place] 1960, 63 y o  female MRN: [id number]  Unit/Bed#: [address] [place] [address] [id number] Encounter: [id number]  Primary Care Provider: CHERELLE Krueger   Date and time admitted to hospital: 7/6/2022  4:59 PM    * 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 heart failure (HCC)  Assessment & Plan  Wt Readings from Last 3 Encounters:   07/10/22 126 kg (277 lb 1 9 oz)   07/06/22 136 kg (300 lb)   07/02/22 128 kg (281 lb 12 oz)     With a history of combined systolic and diastolic CHF, most recent echocardiogram revealing ejection fraction of 30%    · Patient recently admitted and underwent cardiac catheterization on 07/01 with PCI to the mid circumflex artery  · Followed up with Cardiology today, noted 20 lb weight gain since discharge 4 days ago  · Has been compliant with medications, maintained on Demadex 40 mg daily as an outpatient  · BNP elevated at 14,930  · Baseline weight approximately 280  · Weight on arrival 298, down to 277 today   · Monitor daily weights, monitor I's and O's  · Suspect likely discontinuation of Bumex drip today however will await formal recommendations Cardiology        NSVT (nonsustained ventricular tachycardia) ([facility] [address] )  Assessment & Plan  Patient was noted for an 8 beat V tach on telemetry, asymptomatic  · Beta blocker was on hold with prior episode of bradycardia, will resume  · Magnesium normal  · Continue telemetry monitoring , no other events    Mixed hyperlipidemia  Assessment & Plan  · Continue Lipitor daily     Acquired hypothyroidism  Assessment & Plan  · Continue Synthroid 50 mcg daily    Stage 4 chronic kidney disease [facility])  Assessment & Plan  Lab Results   Component Value Date    EGFR 15 07/10/2022    EGFR 16 07/09/2022    EGFR 16 07/08/2022    CREATININE 3 12 (H) 07/10/2022    CREATININE 2 97 (H) 07/09/2022    CREATININE 2 98 (H) 07/08/2022     · Creatinine slightly worsening today to 3 12  · Will likely discontinue Bumex drip today, BMP in a m  Anemia  Assessment & Plan  · With history of anemia of chronic disease  · Hemoglobin stable at baseline    CAD (coronary artery disease)  Assessment & Plan  Status post stenting in 2012, then revised in 2017  · Known chronic total occlusion of RCA  · Most recent cardiac catheterization on 07/01 with PCI to mid circumflex  · Continue is Plavix, Lipitor, aspirin, Imdur, bisoprolol    Type 2 diabetes mellitus with stage 4 chronic kidney disease, with long-term current use of insulin [facility])  Assessment & Plan  Lab Results   Component Value Date    HGBA1C 7 3 (A) 03/29/2022       Recent Labs     07/09/22  1118 07/09/22  1622 07/09/22  2107 07/10/22  0738   POCGLU 253* 219* 296* 271*       Blood Sugar Average: Last 72 hrs:  (P) 907 [id number]   Patient is maintained on Lantus 50 units b i d  And scheduled Humalog 20 units t i d  With associated sliding scale  · with hypoglycemia during admission  · Will re-initiate basal insulin at bedtime starting with 10 units HS  · Sliding scale insulin a c  HS  · Hypoglycemia protocol      VTE Pharmacologic Prophylaxis:   Moderate Risk (Score 3-4) - Pharmacological DVT Prophylaxis Ordered: heparin  Patient Centered Rounds: I performed bedside rounds with nursing staff today  Discussions with Specialists or Other Care Team Provider:  None    Education and Discussions with Family / Patient: Patient declined call to   Time Spent for Care: 15 minutes  More than 50% of total time spent on counseling and coordination of care as described above  Current Length of Stay: 4 day(s)  Current Patient Status: Inpatient   Certification Statement: The patient will continue to require additional inpatient hospital stay due to Elevated creatinine, diuresis  Discharge Plan: Anticipate discharge in 24-48 hrs to home  Code Status: Level 1 - Full Code    Subjective:   Patient states that she is feeling very sleepy today  She did not sleep very well last evening  She denies any current chest discomfort, palpitations, shortness of breath  Objective:     Vitals:   Temp (24hrs), Av 9 °F (36 6 °C), Min:97 2 °F (36 2 °C), Max:98 4 °F (36 9 °C)    Temp:  [97 2 °F (36 2 °C)-98 4 °F (36 9 °C)] 97 2 °F (36 2 °C)  HR:  [59-66] 66  Resp:  [15-22] 17  BP: (110-147)/(52-71) 147/71  SpO2:  [92 %-97 %] 95 %  Body mass index is 42 14 kg/m²  Input and Output Summary (last 24 hours): Intake/Output Summary (Last 24 hours) at 7/10/2022 1053  Last data filed at 7/10/2022 0857  Gross per 24 hour   Intake 960 ml   Output 4750 ml   Net -3790 ml       Physical Exam:   Physical Exam  Vitals and nursing note reviewed  Constitutional:       General: She is not in acute distress  Appearance: Normal appearance  She is obese  She is not ill-appearing, toxic-appearing or diaphoretic  Eyes:      General: No scleral icterus  Conjunctiva/sclera: Conjunctivae normal    Cardiovascular:      Rate and Rhythm: Normal rate and regular rhythm  Heart sounds: No murmur heard  No friction rub  No gallop  Pulmonary:      Effort: Pulmonary effort is normal  No respiratory distress  Breath sounds: Normal breath sounds  No stridor  No wheezing, rhonchi or rales  Chest:      Chest wall: No tenderness  Abdominal:      General: Abdomen is flat  Bowel sounds are normal  There is no distension  Palpations: Abdomen is soft  Tenderness: There is no abdominal tenderness  Musculoskeletal:      Right lower leg: Edema present  Left lower leg: Edema present  Skin:     General: Skin is warm and dry  Coloration: Skin is not jaundiced or pale  Neurological:      General: No focal deficit present  Mental Status: She is alert and oriented to person, place, and time  Mental status is at baseline            Additional Data:     Labs:  Results from last 7 days   Lab Units 07/10/22  0449 22  0536 22  0507   WBC Thousand/uL 5 00   < > 7 43   HEMOGLOBIN g/dL 9 0*   < > 7 6*   HEMATOCRIT % 28 7*   < > 24 6*   PLATELETS Thousands/uL 269   < > 298   NEUTROS PCT %  --   --  62   LYMPHS PCT %  --   --  25   MONOS PCT %  --   --  9   EOS PCT %  --   --  3    < > = values in this interval not displayed  Results from last 7 days   Lab Units 07/10/22  0449 07/07/22  0507 07/06/22  1648   SODIUM mmol/L 143   < > 146*   POTASSIUM mmol/L 4 1   < > 4 1   CHLORIDE mmol/L 102   < > 107   CO2 mmol/L 30   < > 24   BUN mg/dL 76*   < > 64*   CREATININE mg/dL 3 12*   < > 2 94*   ANION GAP mmol/L 11   < > 15*   CALCIUM mg/dL 8 4   < > 8 0*   ALBUMIN g/dL  --   --  2 9*   TOTAL BILIRUBIN mg/dL  --   --  0 20   ALK PHOS U/L  --   --  113   ALT U/L  --   --  30   AST U/L  --   --  11   GLUCOSE RANDOM mg/dL 262*   < > 46*    < > = values in this interval not displayed  Results from last 7 days   Lab Units 07/06/22  1648   INR  1 05     Results from last 7 days   Lab Units 07/10/22  0738 07/09/22  2107 07/09/22  1622 07/09/22  1118 07/09/22  0728 07/08/22  2105 07/08/22  1617 07/08/22  1121 07/08/22  0751 07/07/22  2056 07/07/22  1600 07/07/22  1056   POC GLUCOSE mg/dl 271* 296* 219* 253* 192* 191* 219* 178* 131 192* 195* 149*               Lines/Drains:  Invasive Devices  Report    Peripheral Intravenous Line  Duration           Peripheral IV 07/09/22 Dorsal (posterior); Right Forearm <1 day          Drain  Duration           Suprapubic Catheter Non-latex 16 Fr  276 days                  Telemetry:  Telemetry Orders (From admission, onward)             48 Hour Telemetry Monitoring  (ED Bridging Orders Panel)  Continuous x 48 hours        Expiring   References:    Telemetry Guidelines   Question:  Reason for 48 Hour Telemetry  Answer:  Acute Decompensated CHF (continuous diuretic infusion or total diuretic dose > 200 mg daily, associated electrolyte derangement, ionotropic drip, history of ventricular arrhythmia, or new EF <35%) Telemetry Reviewed: Normal Sinus Rhythm  Indication for Continued Telemetry Use: Acute CHF on >200 mg lasix/day or equivalent dose or with new reduced EF  Imaging: No pertinent imaging reviewed      Recent Cultures (last 7 days):         Last 24 Hours Medication List:   Current Facility-Administered Medications   Medication Dose Route Frequency Provider Last Rate    acetaminophen  650 mg Oral Q6H PRN Antoine SETH Hazel      allopurinol  300 mg Oral Daily [facility], SETH      amLODIPine  10 mg Oral Daily [place], [place]      aspirin  81 mg Oral Daily [place], [place]      atorvastatin  40 mg Oral Daily [facility]      bisoprolol  2 5 mg Oral Daily Sis Betancur MD      bumetanide  0 5 mg/hr Intravenous Continuous Rujul Castrejon, DO 0 5 mg/hr (07/09/22 1823)    citalopram  40 mg Oral Daily [facility]      clopidogrel  75 mg Oral Daily [place], [place]      gabapentin  400 mg Oral 4x Daily [facility] chavez      heparin (porcine)  5,000 Units Subcutaneous [place], [place]      HYDROcodone-acetaminophen  1 tablet Oral TID PRN [facility]      insulin glargine  10 Units Subcutaneous QAM [place], [place]      insulin lispro  2-12 Units Subcutaneous HS [facility], SETH      insulin lispro  2-12 Units Subcutaneous TID AC Sis Betancur MD      isosorbide mononitrate  60 mg Oral Daily [place], [place]      levothyroxine  50 mcg Oral Early Morning [facility]      lidocaine  1 patch Topical Daily [place], [place]      nitroglycerin  0 4 mg Sublingual Q5 Min PRN [facility]      nystatin   Topical BID [place], [place]      OLANZapine  5 mg Oral HS [facility]      ondansetron  4 mg Intravenous Q6H PRN Antoine SETH Hazel      pantoprazole  40 mg Oral BID AC Antoinesandra Hazel PA-C      potassium chloride  40 mEq Oral Daily [facility]      senna  1 tablet Oral Daily [place], [place]      tiZANidine  4 mg Oral Q8H PRN [facility], SETH          Today, Patient Was Seen By: Adriana Jacobo PA-C    **Please Note: This note may have been constructed using a voice recognition system  **

## 2022-07-10 NOTE — PLAN OF CARE
Problem: PAIN - ADULT  Goal: Verbalizes/displays adequate comfort level or baseline comfort level  Description: Interventions:  - Encourage patient to monitor pain and request assistance  - Assess pain using appropriate pain scale  - Administer analgesics based on type and severity of pain and evaluate response  - Implement non-pharmacological measures as appropriate and evaluate response  - Consider cultural and social influences on pain and pain management  - Notify physician/advanced practitioner if interventions unsuccessful or patient reports new pain  Outcome: Progressing     Problem: SAFETY ADULT  Goal: Patient will remain free of falls  Description: INTERVENTIONS:  - Educate patient/family on patient safety including physical limitations  - Instruct patient to call for assistance with activity   - Consult OT/PT to assist with strengthening/mobility   - Keep Call bell within reach  - Keep bed low and locked with side rails adjusted as appropriate  - Keep care items and personal belongings within reach  - Initiate and maintain comfort rounds  - Make Fall Risk Sign visible to staff  - Offer Toileting every  2 Hours, in advance of need  - Obtain necessary fall risk management equipmen  - Apply yellow socks and bracelet for high fall risk patients  - Consider moving patient to room near nurses station  Outcome: Progressing  Goal: Maintain or return to baseline ADL function  Description: INTERVENTIONS:  -  Assess patient's ability to carry out ADLs; assess patient's baseline for ADL function and identify physical deficits which impact ability to perform ADLs (bathing, care of mouth/teeth, toileting, grooming, dressing, etc )  - Assess/evaluate cause of self-care deficits   - Assess range of motion  - Assess patient's mobility; develop plan if impaired  - Assess patient's need for assistive devices and provide as appropriate  - Encourage maximum independence but intervene and supervise when necessary  - Involve family in performance of ADLs  - Assess for home care needs following discharge   - Consider OT consult to assist with ADL evaluation and planning for discharge  - Provide patient education as appropriate  Outcome: Progressing  Goal: Maintains/Returns to pre admission functional level  Description: INTERVENTIONS:  - Perform BMAT or MOVE assessment daily    - Set and communicate daily mobility goal to care team and patient/family/caregiver  - Collaborate with rehabilitation services on mobility goals if consulted  - Ambulate patient 3 times a day  - Out of bed to chair 3  times a day   - Out of bed for meals 3 times a day  - Out of bed for toileting  - Record patient progress and toleration of activity level   Outcome: Progressing     Problem: DISCHARGE PLANNING  Goal: Discharge to home or other facility with appropriate resources  Description: INTERVENTIONS:  - Identify barriers to discharge w/patient and caregiver  - Arrange for needed discharge resources and transportation as appropriate  - Identify discharge learning needs (meds, wound care, etc )  - Arrange for interpretive services to assist at discharge as needed  - Refer to Case Management Department for coordinating discharge planning if the patient needs post-hospital services based on physician/advanced practitioner order or complex needs related to functional status, cognitive ability, or social support system  Outcome: Progressing     Problem: Knowledge Deficit  Goal: Patient/family/caregiver demonstrates understanding of disease process, treatment plan, medications, and discharge instructions  Description: Complete learning assessment and assess knowledge base    Interventions:  - Provide teaching at level of understanding  - Provide teaching via preferred learning methods  Outcome: Progressing     Problem: CARDIOVASCULAR - ADULT  Goal: Maintains optimal cardiac output and hemodynamic stability  Description: INTERVENTIONS:  - Monitor I/O, vital signs and rhythm  - Monitor for S/S and trends of decreased cardiac output  - Administer and titrate ordered vasoactive medications to optimize hemodynamic stability  - Assess quality of pulses, skin color and temperature  - Assess for signs of decreased coronary artery perfusion  - Instruct patient to report change in severity of symptoms  Outcome: Progressing  Goal: Absence of cardiac dysrhythmias or at baseline rhythm  Description: INTERVENTIONS:  - Continuous cardiac monitoring, vital signs, obtain 12 lead EKG if ordered  - Administer antiarrhythmic and heart rate control medications as ordered  - Monitor electrolytes and administer replacement therapy as ordered  Outcome: Progressing     Problem: SKIN/TISSUE INTEGRITY - ADULT  Goal: Skin Integrity remains intact(Skin Breakdown Prevention)  Description: Assess:  -Perform [place] assessment twice day  -Clean and moisturize skin every day    Outcome: Progressing  Goal: Incision(s), wounds(s) or drain site(s) healing without S/S of infection  Description: INTERVENTIONS  - Assess and document dressing, incision, wound bed, drain sites and surrounding tissue  - Provide patient and family education    Outcome: Progressing     Problem: Potential for Falls  Goal: Patient will remain free of falls  Description: INTERVENTIONS:  - Educate patient/family on patient safety including physical limitations  - Instruct patient to call for assistance with activity   - Consult OT/PT to assist with strengthening/mobility   - Keep Call bell within reach  - Keep bed low and locked with side rails adjusted as appropriate  - Keep care items and personal belongings within reach  - Initiate and maintain comfort rounds  - Make Fall Risk Sign visible to staff  - Offer Toileting every  2 Hours, in advance of need  - Obtain necessary fall risk management equipmen  - Apply yellow socks and bracelet for high fall risk patients  - Consider moving patient to room near nurses station  Outcome: Progressing     Problem: Prexisting or High Potential for Compromised Skin Integrity  Goal: Skin integrity is maintained or improved  Description: INTERVENTIONS:  - Identify patients at risk for skin breakdown  - Assess and monitor skin integrity  - Assess and monitor nutrition and hydration status  - Monitor labs   - Assess for incontinence   - Turn and reposition patient  - Assist with mobility/ambulation  - Relieve pressure over bony prominences  - Avoid friction and shearing  - Provide appropriate hygiene as needed including keeping skin clean and dry  - Evaluate need for skin moisturizer/barrier cream  - Collaborate with interdisciplinary team   - Patient/family teaching  - Consider wound care consult   Outcome: Progressing     Problem: MOBILITY - ADULT  Goal: Maintain or return to baseline ADL function  Description: INTERVENTIONS:  -  Assess patient's ability to carry out ADLs; assess patient's baseline for ADL function and identify physical deficits which impact ability to perform ADLs (bathing, care of mouth/teeth, toileting, grooming, dressing, etc )  - Assess/evaluate cause of self-care deficits   - Assess range of motion  - Assess patient's mobility; develop plan if impaired  - Assess patient's need for assistive devices and provide as appropriate  - Encourage maximum independence but intervene and supervise when necessary  - Involve family in performance of ADLs  - Assess for home care needs following discharge   - Consider OT consult to assist with ADL evaluation and planning for discharge  - Provide patient education as appropriate  Outcome: Progressing  Goal: Maintains/Returns to pre admission functional level  Description: INTERVENTIONS:  - Perform BMAT or MOVE assessment daily    - Set and communicate daily mobility goal to care team and patient/family/caregiver     - Collaborate with rehabilitation services on mobility goals if consulted  - Ambulate patient 3 times a day  - Out of bed to chair 3 times a day   - Out of bed for meals 3 times a day  - Out of bed for toileting  - Record patient progress and toleration of activity level   Outcome: Progressing

## 2022-07-10 NOTE — ASSESSMENT & PLAN NOTE
Lab Results   Component Value Date    HGBA1C 7 3 (A) 03/29/2022       Recent Labs     07/09/22  1118 07/09/22  1622 07/09/22  2107 07/10/22  0738   POCGLU 253* 219* 296* 271*       Blood Sugar Average: Last 72 hrs:  (P) 019 [id number]   Patient is maintained on Lantus 50 units b i d  And scheduled Humalog 20 units t i d  With associated sliding scale    · with hypoglycemia during admission  · Will re-initiate basal insulin at bedtime starting with 10 units HS  · Sliding scale insulin a c  HS  · Hypoglycemia protocol

## 2022-07-10 NOTE — ASSESSMENT & PLAN NOTE
Lab Results   Component Value Date    EGFR 15 07/10/2022    EGFR 16 07/09/2022    EGFR 16 07/08/2022    CREATININE 3 12 (H) 07/10/2022    CREATININE 2 97 (H) 07/09/2022    CREATININE 2 98 (H) 07/08/2022     · Creatinine slightly worsening today to 3 12  · Will likely discontinue Bumex drip today, BMP in a m

## 2022-07-11 LAB
ANION GAP SERPL CALCULATED.3IONS-SCNC: 11 MMOL/L (ref 4–13)
BUN SERPL-MCNC: 86 MG/DL (ref 5–25)
CALCIUM SERPL-MCNC: 8.5 MG/DL (ref 8.3–10.1)
CHLORIDE SERPL-SCNC: 103 MMOL/L (ref 100–108)
CO2 SERPL-SCNC: 28 MMOL/L (ref 21–32)
CREAT SERPL-MCNC: 3.25 MG/DL (ref 0.6–1.3)
ERYTHROCYTE [DISTWIDTH] IN BLOOD BY AUTOMATED COUNT: 15.3 % (ref 11.6–15.1)
GFR SERPL CREATININE-BSD FRML MDRD: 14 ML/MIN/1.73SQ M
GLUCOSE SERPL-MCNC: 259 MG/DL (ref 65–140)
GLUCOSE SERPL-MCNC: 272 MG/DL (ref 65–140)
GLUCOSE SERPL-MCNC: 306 MG/DL (ref 65–140)
GLUCOSE SERPL-MCNC: 369 MG/DL (ref 65–140)
GLUCOSE SERPL-MCNC: 420 MG/DL (ref 65–140)
HCT VFR BLD AUTO: 26.6 % (ref 34.8–46.1)
HGB BLD-MCNC: 8.3 G/DL (ref 11.5–15.4)
MCH RBC QN AUTO: 31.8 PG (ref 26.8–34.3)
MCHC RBC AUTO-ENTMCNC: 31.2 G/DL (ref 31.4–37.4)
MCV RBC AUTO: 102 FL (ref 82–98)
PLATELET # BLD AUTO: 259 THOUSANDS/UL (ref 149–390)
PMV BLD AUTO: 10 FL (ref 8.9–12.7)
POTASSIUM SERPL-SCNC: 4.2 MMOL/L (ref 3.5–5.3)
RBC # BLD AUTO: 2.61 MILLION/UL (ref 3.81–5.12)
SODIUM SERPL-SCNC: 142 MMOL/L (ref 136–145)
WBC # BLD AUTO: 6.04 THOUSAND/UL (ref 4.31–10.16)

## 2022-07-11 PROCEDURE — 85027 COMPLETE CBC AUTOMATED: CPT | Performed by: PHYSICIAN ASSISTANT

## 2022-07-11 PROCEDURE — 99223 1ST HOSP IP/OBS HIGH 75: CPT | Performed by: INTERNAL MEDICINE

## 2022-07-11 PROCEDURE — 99232 SBSQ HOSP IP/OBS MODERATE 35: CPT | Performed by: INTERNAL MEDICINE

## 2022-07-11 PROCEDURE — 82948 REAGENT STRIP/BLOOD GLUCOSE: CPT

## 2022-07-11 PROCEDURE — 99232 SBSQ HOSP IP/OBS MODERATE 35: CPT | Performed by: PHYSICIAN ASSISTANT

## 2022-07-11 PROCEDURE — 80048 BASIC METABOLIC PNL TOTAL CA: CPT | Performed by: PHYSICIAN ASSISTANT

## 2022-07-11 RX ORDER — AMLODIPINE BESYLATE 10 MG/1
10 TABLET ORAL DAILY
Status: DISCONTINUED | OUTPATIENT
Start: 2022-07-12 | End: 2022-07-16

## 2022-07-11 RX ORDER — GABAPENTIN 400 MG/1
400 CAPSULE ORAL DAILY
Status: DISCONTINUED | OUTPATIENT
Start: 2022-07-12 | End: 2022-07-19 | Stop reason: HOSPADM

## 2022-07-11 RX ORDER — INSULIN LISPRO 100 [IU]/ML
5 INJECTION, SOLUTION INTRAVENOUS; SUBCUTANEOUS ONCE
Status: COMPLETED | OUTPATIENT
Start: 2022-07-11 | End: 2022-07-11

## 2022-07-11 RX ORDER — INSULIN GLARGINE 100 [IU]/ML
10 INJECTION, SOLUTION SUBCUTANEOUS EVERY 12 HOURS SCHEDULED
Status: DISCONTINUED | OUTPATIENT
Start: 2022-07-11 | End: 2022-07-12

## 2022-07-11 RX ORDER — ISOSORBIDE MONONITRATE 60 MG/1
60 TABLET, EXTENDED RELEASE ORAL DAILY
Status: DISCONTINUED | OUTPATIENT
Start: 2022-07-12 | End: 2022-07-19 | Stop reason: HOSPADM

## 2022-07-11 RX ADMIN — HEPARIN SODIUM 5000 UNITS: 5000 INJECTION INTRAVENOUS; SUBCUTANEOUS at 05:47

## 2022-07-11 RX ADMIN — PANTOPRAZOLE SODIUM 40 MG: 40 TABLET, DELAYED RELEASE ORAL at 05:47

## 2022-07-11 RX ADMIN — CLOPIDOGREL BISULFATE 75 MG: 75 TABLET ORAL at 09:22

## 2022-07-11 RX ADMIN — LEVOTHYROXINE SODIUM 50 MCG: 50 TABLET ORAL at 05:47

## 2022-07-11 RX ADMIN — NYSTATIN: 100000 POWDER TOPICAL at 09:09

## 2022-07-11 RX ADMIN — ISOSORBIDE MONONITRATE 60 MG: 60 TABLET, EXTENDED RELEASE ORAL at 09:06

## 2022-07-11 RX ADMIN — INSULIN LISPRO 5 UNITS: 100 INJECTION, SOLUTION INTRAVENOUS; SUBCUTANEOUS at 17:20

## 2022-07-11 RX ADMIN — SENNOSIDES 8.6 MG: 8.6 TABLET, FILM COATED ORAL at 09:05

## 2022-07-11 RX ADMIN — HYDROCODONE BITARTRATE AND ACETAMINOPHEN 1 TABLET: 5; 325 TABLET ORAL at 19:43

## 2022-07-11 RX ADMIN — GABAPENTIN 400 MG: 400 CAPSULE ORAL at 09:06

## 2022-07-11 RX ADMIN — INSULIN GLARGINE 10 UNITS: 100 INJECTION, SOLUTION SUBCUTANEOUS at 09:05

## 2022-07-11 RX ADMIN — HEPARIN SODIUM 5000 UNITS: 5000 INJECTION INTRAVENOUS; SUBCUTANEOUS at 22:14

## 2022-07-11 RX ADMIN — TORSEMIDE 40 MG: 20 TABLET ORAL at 17:21

## 2022-07-11 RX ADMIN — OLANZAPINE 5 MG: 5 TABLET, FILM COATED ORAL at 22:14

## 2022-07-11 RX ADMIN — LIDOCAINE 1 PATCH: 50 PATCH CUTANEOUS at 09:08

## 2022-07-11 RX ADMIN — POTASSIUM CHLORIDE 40 MEQ: 1500 TABLET, EXTENDED RELEASE ORAL at 09:05

## 2022-07-11 RX ADMIN — TORSEMIDE 40 MG: 20 TABLET ORAL at 09:05

## 2022-07-11 RX ADMIN — BISOPROLOL FUMARATE 2.5 MG: 5 TABLET ORAL at 09:07

## 2022-07-11 RX ADMIN — ALLOPURINOL 300 MG: 100 TABLET ORAL at 09:06

## 2022-07-11 RX ADMIN — ATORVASTATIN CALCIUM 40 MG: 40 TABLET, FILM COATED ORAL at 09:06

## 2022-07-11 RX ADMIN — CITALOPRAM HYDROBROMIDE 40 MG: 20 TABLET ORAL at 09:06

## 2022-07-11 RX ADMIN — AMLODIPINE BESYLATE 10 MG: 10 TABLET ORAL at 09:06

## 2022-07-11 RX ADMIN — INSULIN LISPRO 6 UNITS: 100 INJECTION, SOLUTION INTRAVENOUS; SUBCUTANEOUS at 09:04

## 2022-07-11 RX ADMIN — HEPARIN SODIUM 5000 UNITS: 5000 INJECTION INTRAVENOUS; SUBCUTANEOUS at 13:29

## 2022-07-11 RX ADMIN — PANTOPRAZOLE SODIUM 40 MG: 40 TABLET, DELAYED RELEASE ORAL at 17:21

## 2022-07-11 RX ADMIN — HYDROCODONE BITARTRATE AND ACETAMINOPHEN 1 TABLET: 5; 325 TABLET ORAL at 10:17

## 2022-07-11 RX ADMIN — INSULIN LISPRO 12 UNITS: 100 INJECTION, SOLUTION INTRAVENOUS; SUBCUTANEOUS at 22:15

## 2022-07-11 RX ADMIN — ASPIRIN 81 MG: 81 TABLET, COATED ORAL at 09:06

## 2022-07-11 RX ADMIN — INSULIN LISPRO 10 UNITS: 100 INJECTION, SOLUTION INTRAVENOUS; SUBCUTANEOUS at 17:20

## 2022-07-11 RX ADMIN — INSULIN LISPRO 8 UNITS: 100 INJECTION, SOLUTION INTRAVENOUS; SUBCUTANEOUS at 12:27

## 2022-07-11 RX ADMIN — HYDROCODONE BITARTRATE AND ACETAMINOPHEN 1 TABLET: 5; 325 TABLET ORAL at 22:17

## 2022-07-11 RX ADMIN — INSULIN GLARGINE 10 UNITS: 100 INJECTION, SOLUTION SUBCUTANEOUS at 22:15

## 2022-07-11 RX ADMIN — NYSTATIN: 100000 POWDER TOPICAL at 17:20

## 2022-07-11 NOTE — ASSESSMENT & PLAN NOTE
Lab Results   Component Value Date    HGBA1C 7 3 (A) 03/29/2022       Recent Labs     07/10/22  1608 07/10/22  2054 07/11/22  0750 07/11/22  1111   POCGLU 273* 372* 259* 306*       Blood Sugar Average: Last 72 hrs:  (P) 271 [id number]   Patient is maintained on Lantus 50 units b i d  And scheduled Humalog 20 units t i d  With associated sliding scale    · with hypoglycemia during admission  · Basal insulin re-initiated starting at 10 units Q 12  · Sliding scale insulin a c  HS  · Hypoglycemia protocol

## 2022-07-11 NOTE — CASE MANAGEMENT
Case Management Assessment & Discharge Planning Note    Patient name Annmarie Anne  Location [address] Eileen Brooks* MRN [id number]  : 1960 Date 2022       Current Admission Date: 2022  Current Admission Diagnosis: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 heart failure [facility])   Patient Active Problem List    Diagnosis Date Noted    NSVT (nonsustained ventricular tachycardia) ([facility] ) 2022    Hyponatremia 2022    History of anemia due to chronic kidney disease 2022    Bradycardia 2022    Syncope 2022    Acute renal failure superimposed on chronic kidney disease ([address] ) 2022    Chest pain 2022    Elevated troponin I level 2022    Hypotension 2022    ALFRED (acute kidney injury) ([facility] ) 2022    Encounter for examination following treatment at hospital 2022    Other artificial openings of urinary tract status ([address] ) 02/10/2022    Continuous opioid dependence ([address] ) 02/10/2022    Adrenal nodule ([facility] ) 02/10/2022    Stable angina ([address] ) 02/10/2022    Volume overload 02/10/2022    Acquired hypothyroidism 10/14/2021    Mixed hyperlipidemia 10/14/2021    Gastroesophageal reflux disease without esophagitis 10/13/2021    Other proteinuria 2021    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 heart failure ([address] ) 2021    Prolonged QT interval 2021    Urinary tract infection associated with cystostomy catheter ([facility] ) 2021    Neurogenic bladder 2021    Morbid obesity with BMI of 45 0-49 9, adult ([address] ) 06/15/2021    History of heart artery stent 06/15/2021    Stage 4 chronic kidney disease ([address] ) 2021    Memory impairment 2021    Chronic bronchitis ([address] ) 2021    Severe episode of recurrent major depressive disorder, without psychotic features ([facility] [facility] ) 2021    Skin ulcer of hand, limited to breakdown of skin ([facility] [facility] ) 2021    HTN (hypertension) 12/21/2020    Diabetic ulcer of toe of right foot associated with type 2 diabetes mellitus, with muscle involvement without evidence of necrosis [facility] ) 11/25/2020    Head lump 11/11/2020    Anemia 09/09/2020    Abnormal LFTs 09/09/2020    S/P cervical spinal fusion 09/09/2020    Major depressive disorder 09/02/2020    Type 2 diabetes mellitus with stage 4 chronic kidney disease, with long-term current use of insulin ([facility] [facility] ) 09/02/2020    Anxiety 09/02/2020    CAD (coronary artery disease) 09/02/2020    Osteoarthritis of left knee 09/02/2020    Cellulitis of finger of right hand 08/26/2020      LOS (days): 5  Geometric Mean LOS (GMLOS) (days): 3 80  Days to GMLOS:-1     OBJECTIVE:  PATIENT READMITTED TO HOSPITAL  Risk of Unplanned Readmission Score: 53 63         Current admission status: Inpatient       Preferred Pharmacy:   [address] [place] [address], [address] [facility]  [address]   [place] [address]  Phone: [phone] Fax: [phone]    Primary Care Provider: CHERELLE Chase    Primary Insurance: [facility]  Secondary Insurance: [address] [facility]    ASSESSMENT:  [facility] Representative - Daughter   Primary Phone: [phone] (Mobile)               Advance Directives  Does patient have a [address] [place] [address] Avenue?: No  Was patient offered paperwork?: Yes (pt declined; moving to [facility] next month)  Does patient currently have a Health Care decision maker?: No  Does patient have Advance Directives?: No  Was patient offered paperwork?: Yes (pt declined; moving to [facility] next month)  Primary Contact: Diana Lynn (dtr) [phone]         Readmission Root Cause  30 Day Readmission: Yes  Who directed you to return to the hospital?: Family  Did you understand whom to contact if you had questions or problems?: Yes  Did you get your prescriptions before you left the hospital?: Yes  Were you able to get your prescriptions filled when you left the hospital?: Yes  Did you take your medications as prescribed?: Yes  Were you able to get to your follow-up appointments?: Yes  During previous admission, was a post-acute recommendation made?: No  Patient was readmitted due to: 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 heart failure  Action Plan: Cardiology consulted, OP f/u once d/c    Patient Information  Admitted from[de-identified] Home  Mental Status: Alert  During Assessment patient was accompanied by: Not accompanied during assessment  Assessment information provided by[de-identified] Patient  Primary Caregiver: Family  Caregiver's Name[de-identified] Remi Purchase Relationship to Patient[de-identified] Family Member  Caregiver's Telephone Number[de-identified] [phone]  Support Systems: DaughterDax 61 of Residence: [address] do you live in?: [address] [place] [address] entry access options   Select all that apply : Stairs  Number of steps to enter home : 6  Do the steps have railings?: Yes  Type of Current Residence: 2 story home  Upon entering residence, is there a bedroom on the main floor (no further steps)?: Yes  Upon entering residence, is there a bathroom on the main floor (no further steps)?: Yes  In the last 12 months, was there a time when you were not able to pay the mortgage or rent on time?: No  In the last 12 months, how many places have you lived?: 1  In the last 12 months, was there a time when you did not have a steady place to sleep or slept in a shelter (including now)?: No  Homeless/housing insecurity resource given?: N/A  Living Arrangements: Lives w/ Daughter  Is patient a ?: No    Activities of Daily Living Prior to Admission  Functional Status: Independent  Completes ADLs independently?: Yes  Ambulates independently?: Yes  Does patient use assisted devices?: Yes  Assisted Devices (DME) used: Larry Newell Showwalker Chair  Does patient currently own DME?: Yes  What DME does the patient currently own?: Shower Chair, Straight [employer] Enter, Walker  Does patient have a history of Outpatient Therapy (PT/OT)?: Yes ([facility])  Does the patient have a history of Short-Term Rehab?: No  Does patient have a history of HHC?: Yes ([facility])  Does patient currently have [facility]?: No    Current Home Health Care  Type of Current Home Care Services: 24hr Caregiver, Other (Comment) (dtr)    Patient Information Continued  Income Source:  Other (Comment) (disability)  Does patient have prescription coverage?: Yes  Within the past 12 months, you worried that your food would run out before you got the money to buy more : Never true  Within the past 12 months, the food you bought just didn't last and you didn't have money to get more : Never true  Food insecurity resource given?: N/A  Does patient receive dialysis treatments?: No  Does patient have a history of substance abuse?: No  Does patient have a history of Mental Health Diagnosis?: Yes  Is patient receiving treatment for mental health?: Yes  Has patient received inpatient treatment related to mental health in the last 2 years?: No         Means of Transportation  Means of Transport to Appts[de-identified] Family transport  In the past 12 months, has lack of transportation kept you from medical appointments or from getting medications?: No  In the past 12 months, has lack of transportation kept you from meetings, work, or from getting things needed for daily living?: No  Was application for public transport provided?: N/A        DISCHARGE DETAILS:    Discharge planning discussed with[de-identified] patient        CM contacted family/caregiver?: No- see comments (pt declined)  Were Treatment Team discharge recommendations reviewed with patient/caregiver?: Yes  Did patient/caregiver verbalize understanding of patient care needs?: Yes  Were patient/caregiver advised of the risks associated with not following Treatment Team discharge recommendations?: Yes                             Treatment Team Recommendation: Home  Discharge Destination Plan[de-identified] Home                                         Additional Comments: CM met w/ pt at bedside to complete assessment  Pt lives in a 2 story home w/ her dtr & boyfriend w/ 6 steps to enter  Pt lives on the first floor of the home  Pt is able to complete her ADL's independently w/ the assistance of a cane, walker & shower chair  Pt has hx w/ [facility] & her dtr is her fulltime caretaker  Her dtr is availible to pick her up when ready to d/c  Pt recieves disability  Pt dtr is her EC but not POA  Pt does not have LW or AD  Declined paperwork because she is planning on moving to [place] w/ her friends  Pt denied any questions or concerns at this time  CM to continue to follow pt care & d/c

## 2022-07-11 NOTE — ASSESSMENT & PLAN NOTE
Lab Results   Component Value Date    EGFR 14 07/11/2022    EGFR 15 07/10/2022    EGFR 16 07/09/2022    CREATININE 3 25 (H) 07/11/2022    CREATININE 3 12 (H) 07/10/2022    CREATININE 2 97 (H) 07/09/2022     · Now with ALFRED  · Nephrology consulted, recommending decreasing gabapentin dose to 400 mg daily  · Avoid hypotension

## 2022-07-11 NOTE — PROGRESS NOTES
Progress Note - Cardiology   Maryana Manrique 63 y o  female MRN: [id number]  Unit/Bed#: [place] 2 -01 Encounter: [id number]      Asessment:  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 heart failure  Ischemic cardiomyopathy   - LVEF 30%, moderate LVH, mild LA dilatation, AV sclerosis, trace AR, mild MR, MV sclerosis, mild TR, June 2022  Coronary artery disease with multiple PCIs in the past   - NSTEMI s/p JANET-mLCx w/ small 95% pRCA and diffuse moderate disease of LAD, July 2022  - S/P -pRCA, August 2021    - S/P JANET-mLAD, JANET-D1 and JANET-LCx, December 2012  Mild acute kidney injury on chronic kidney disease stage IV  Nonsustained ventricular tachycardia, 8 beat run via telemetry  Hypertension  Dyslipidemia   - Rx, atorvastatin 40 mg daily  - lipid panel 6/24/22: Cholesterol 120, triglycerides 138, HDL 42, LDL 50  Morbid obesity  Anemia of chronic kidney disease  Type 2 diabetes mellitus  Polycystic ovarian syndrome  Neurogenic bladder with suprapubic catheter in place  Obstructive sleep apnea, not on CPAP  Hx syncope with orthostatic hypotension  Hypothyroid    Plan/ Discussion:  · Status post Bumex infusion, transitioned to torsemide 40 mg twice daily yesterday evening  Noted renal function today 3 25 mg/dL  Monitor closely in the setting of diuretics  Appreciate nephrology assistance  · Currently on amlodipine 10 mg daily, bisoprolol 2 5 mg daily, isosorbide mononitrate 60 mg daily Plavix 75 mg daily, aspirin 81 mg daily  Will increase atorvastatin to 80 mg daily  · Monitor volume status closely with strict intake/output, daily weight  · Monitor renal function and electrolytes closely; recommend to maintain potassium > 4, magnesium > 2  Currently on standing K-Dur 40 mEq daily  Subjective:  Patient seen and examined at the bedside  Patient states she felt like she had a chest spasm yesterday, however this did not feel like a pressure or pain   Patient had also noted that she had shoulder discomfort that improved with a lidocaine patch  Patient denies shortness of breath      Vitals:  Vitals:    07/09/22 0557 07/10/22 0600   Weight: 126 kg (278 lb 10 6 oz) 126 kg (277 lb 1 9 oz)   ,  Vitals:    07/10/22 2056 07/10/22 2352 07/11/22 0553 07/11/22 0747   BP: 121/53 148/61 150/62 150/61   BP Location:    Right arm   Pulse: 59 56 59 61   Resp: 18 18  16   Temp: 98 2 °F (36 8 °C) 97 6 °F (36 4 °C) (!) 97 4 °F (36 3 °C) 97 6 °F (36 4 °C)   TempSrc: Oral Oral  Oral   SpO2: 97% 95% 94% 97%   Weight:       Height:           Exam:  General: Alert awake and oriented, no acute distress  Heart:  Regular rate and rhythm, S1, S2  Respiratory effort/ Lungs:  Clear breath sounds bilaterally  Abdominal: Obese, rounded, normoactive bowel sounds  Lower Limbs: ++ bilateral lower extremity edema           Telemetry:       NSR on exam     Medications:    Current Facility-Administered Medications:     acetaminophen (TYLENOL) tablet 650 mg, 650 mg, Oral, Q6H PRN, Zula Imus, PA-C, 650 mg at 07/09/22 3985    allopurinol (ZYLOPRIM) tablet 300 mg, 300 mg, Oral, Daily, Zula Imus, PA-C, 300 mg at 07/11/22 0906    [START ON 7/12/2022] amLODIPine (NORVASC) tablet 10 mg, 10 mg, Oral, Daily, Betty Rodas MD    aspirin (ECOTRIN LOW STRENGTH) EC tablet 81 mg, 81 mg, Oral, Daily, Zula Imus, PA-C, 81 mg at 07/11/22 0906    atorvastatin (LIPITOR) tablet 40 mg, 40 mg, Oral, Daily, Zula Imus, PA-C, 40 mg at 07/11/22 0906    bisoprolol (ZEBETA) tablet 2 5 mg, 2 5 mg, Oral, Daily, Yana Whitten MD, 2 5 mg at 07/11/22 0907    citalopram (CeleXA) tablet 40 mg, 40 mg, Oral, Daily, Zula Imus, PA-C, 40 mg at 07/11/22 0906    clopidogrel (PLAVIX) tablet 75 mg, 75 mg, Oral, Daily, Zula Imus, PA-C, 75 mg at 07/11/22 3589    [START ON 7/12/2022] gabapentin (NEURONTIN) capsule 400 mg, 400 mg, Oral, Daily, Zula Imus, PA-C    heparin (porcine) subcutaneous injection 5,000 Units, 5,000 Units, Subcutaneous, Q8H [facility], 5,000 Units at 07/11/22 0547 **AND** [CANCELED] Platelet count, , , Once, Mikel Richardson PA-C    HYDROcodone-acetaminophen [facility]) 5-325 mg per tablet 1 tablet, 1 tablet, Oral, TID PRN, Mikel Richardson PA-C, 1 tablet at 07/11/22 1017    insulin glargine (LANTUS) subcutaneous injection 10 Units 0 1 mL, 10 Units, Subcutaneous, Q12H [address], Mikel Richardson PA-C, 10 Units at 07/11/22 0905    insulin lispro (HumaLOG) 100 units/mL subcutaneous injection 2-12 Units, 2-12 Units, Subcutaneous, HS, Mikel Richardson PA-C, 10 Units at 07/10/22 2111    insulin lispro (HumaLOG) 100 units/mL subcutaneous injection 2-12 Units, 2-12 Units, Subcutaneous, TID AC, 8 Units at 07/11/22 1227 **AND** Fingerstick Glucose (POCT), , , 4x Daily AC and at bedtime, MD Page Llamas  [START ON 7/12/2022] isosorbide mononitrate (IMDUR) 24 hr tablet 60 mg, 60 mg, Oral, Daily, Ok Resendez MD    levothyroxine tablet 50 mcg, 50 mcg, Oral, Early Morning, Mikel Richardson PA-C, 50 mcg at 07/11/22 0547    lidocaine (LIDODERM) 5 % patch 1 patch, 1 patch, Topical, Daily, Mikel Richardson PA-C, 1 patch at 07/11/22 0908    nitroglycerin (NITROSTAT) SL tablet 0 4 mg, 0 4 mg, Sublingual, Q5 Min PRN, Mikel Richardson PA-C, 0 4 mg at 07/09/22 1930    nystatin (MYCOSTATIN) powder, , Topical, BID, Mikel Richardson PA-C, Given at 07/11/22 0909    OLANZapine (ZyPREXA) tablet 5 mg, 5 mg, Oral, HS, Mikel Richardson PA-C, 5 mg at 07/10/22 2111    ondansetron (ZOFRAN) injection 4 mg, 4 mg, Intravenous, Q6H PRN, Mikel Dylan, PA-C    pantoprazole (PROTONIX) EC tablet 40 mg, 40 mg, Oral, BID AC, Mikel Gravelly, PA-C, 40 mg at 07/11/22 0547    potassium chloride (K-DUR,KLOR-CON) CR tablet 40 mEq, 40 mEq, Oral, Daily, Mikel Gravelly, PA-C, 40 mEq at 07/11/22 0905    senna (SENOKOT) tablet 8 6 mg, 1 tablet, Oral, Daily, Mikel Hongon, PA-C, 8 6 mg at 07/11/22 0905    tiZANidine (ZANAFLEX) tablet 4 mg, 4 mg, Oral, Q8H PRN, Mikel Hongon, PA-C, 4 mg at 07/08/22 2225    torsemide (DEMADEX) tablet 40 mg, 40 mg, Oral, BID (diuretic), Rujul Castrejon, DO, 40 mg at 07/11/22 0905      Labs/Data:        Results from last 7 days   Lab Units 07/11/22  0557 07/10/22  0449 07/08/22  0536   WBC Thousand/uL 6 04 5 00 6 96   HEMOGLOBIN g/dL 8 3* 9 0* 7 9*   HEMATOCRIT % 26 6* 28 7* 25 5*   PLATELETS Thousands/uL 259 269 283     Results from last 7 days   Lab Units 07/11/22  0557 07/10/22  0449 07/09/22  0506   POTASSIUM mmol/L 4 2 4 1 4 2   CHLORIDE mmol/L 103 102 104   CO2 mmol/L 28 30 28   BUN mg/dL 86* 76* 73*

## 2022-07-11 NOTE — PLAN OF CARE
Problem: PAIN - ADULT  Goal: Verbalizes/displays adequate comfort level or baseline comfort level  Description: Interventions:  - Encourage patient to monitor pain and request assistance  - Assess pain using appropriate pain scale  - Administer analgesics based on type and severity of pain and evaluate response  - Implement non-pharmacological measures as appropriate and evaluate response  - Consider cultural and social influences on pain and pain management  - Notify physician/advanced practitioner if interventions unsuccessful or patient reports new pain  Outcome: Progressing     Problem: SAFETY ADULT  Goal: Patient will remain free of falls  Description: INTERVENTIONS:  - Educate patient/family on patient safety including physical limitations  - Instruct patient to call for assistance with activity   - Consult OT/PT to assist with strengthening/mobility   - Keep Call bell within reach  - Keep bed low and locked with side rails adjusted as appropriate  - Keep care items and personal belongings within reach  - Initiate and maintain comfort rounds  - Make Fall Risk Sign visible to staff  - Offer Toileting every  2 Hours, in advance of need  - Obtain necessary fall risk management equipmen  - Apply yellow socks and bracelet for high fall risk patients  - Consider moving patient to room near nurses station  Outcome: Progressing  Goal: Maintain or return to baseline ADL function  Description: INTERVENTIONS:  -  Assess patient's ability to carry out ADLs; assess patient's baseline for ADL function and identify physical deficits which impact ability to perform ADLs (bathing, care of mouth/teeth, toileting, grooming, dressing, etc )  - Assess/evaluate cause of self-care deficits   - Assess range of motion  - Assess patient's mobility; develop plan if impaired  - Assess patient's need for assistive devices and provide as appropriate  - Encourage maximum independence but intervene and supervise when necessary  - Involve family in performance of ADLs  - Assess for home care needs following discharge   - Consider OT consult to assist with ADL evaluation and planning for discharge  - Provide patient education as appropriate  Outcome: Progressing  Goal: Maintains/Returns to pre admission functional level  Description: INTERVENTIONS:  - Perform BMAT or MOVE assessment daily    - Set and communicate daily mobility goal to care team and patient/family/caregiver  - Collaborate with rehabilitation services on mobility goals if consulted  - Ambulate patient 3 times a day  - Out of bed to chair 3  times a day   - Out of bed for meals 3 times a day  - Out of bed for toileting  - Record patient progress and toleration of activity level   Outcome: Progressing     Problem: DISCHARGE PLANNING  Goal: Discharge to home or other facility with appropriate resources  Description: INTERVENTIONS:  - Identify barriers to discharge w/patient and caregiver  - Arrange for needed discharge resources and transportation as appropriate  - Identify discharge learning needs (meds, wound care, etc )  - Arrange for interpretive services to assist at discharge as needed  - Refer to Case Management Department for coordinating discharge planning if the patient needs post-hospital services based on physician/advanced practitioner order or complex needs related to functional status, cognitive ability, or social support system  Outcome: Progressing     Problem: Knowledge Deficit  Goal: Patient/family/caregiver demonstrates understanding of disease process, treatment plan, medications, and discharge instructions  Description: Complete learning assessment and assess knowledge base    Interventions:  - Provide teaching at level of understanding  - Provide teaching via preferred learning methods  Outcome: Progressing

## 2022-07-11 NOTE — PROGRESS NOTES
[address]  Progress Note - Latoya Alva 1960, 63 y o  female MRN: [id number]  Unit/Bed#: [address] [place] [address] 206-01 Encounter: [id number]  Primary Care Provider: CHERELLE Wood   Date and time admitted to hospital: 7/6/2022  4:59 PM    * 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 heart failure (HCC)  Assessment & Plan  Wt Readings from Last 3 Encounters:   07/10/22 126 kg (277 lb 1 9 oz)   07/06/22 136 kg (300 lb)   07/02/22 128 kg (281 lb 12 oz)     With a history of combined systolic and diastolic CHF, most recent echocardiogram revealing ejection fraction of 30%    · Patient recently admitted and underwent cardiac catheterization on 07/01 with PCI to the mid circumflex artery  · Followed up with Cardiology today, noted 20 lb weight gain since discharge 4 days ago  · Has been compliant with medications, maintained on Demadex 40 mg daily as an outpatient  · BNP elevated at 14,930  · Baseline weight approximately 280  · Weight on arrival 298, down to 277 today   · Monitor daily weights, monitor I's and O's  · Bumex drip discontinued by Cardiology, started back on Demadex 40 mg b i d   · Now with a KI        NSVT (nonsustained ventricular tachycardia) ([facility] [address] )  Assessment & Plan  Patient was noted for an 8 beat V tach on telemetry, asymptomatic  · Beta blocker was on hold with prior episode of bradycardia, will resume  · Magnesium normal  · Continue telemetry monitoring , no other events    Mixed hyperlipidemia  Assessment & Plan  · Continue Lipitor daily     Acquired hypothyroidism  Assessment & Plan  · Continue Synthroid 50 mcg daily    Stage 4 chronic kidney disease [facility])  Assessment & Plan  Lab Results   Component Value Date    EGFR 14 07/11/2022    EGFR 15 07/10/2022    EGFR 16 07/09/2022    CREATININE 3 25 (H) 07/11/2022    CREATININE 3 12 (H) 07/10/2022    CREATININE 2 97 (H) 07/09/2022     · Now with ALFRED  · Nephrology consulted, recommending decreasing gabapentin dose to 400 mg daily  · Avoid hypotension    Anemia  Assessment & Plan  · With history of anemia of chronic disease  · Hemoglobin stable at baseline    CAD (coronary artery disease)  Assessment & Plan  Status post stenting in 2012, then revised in 2017  · Known chronic total occlusion of RCA  · Most recent cardiac catheterization on 07/01 with PCI to mid circumflex  · Continue is Plavix, Lipitor, aspirin, Imdur, bisoprolol    Type 2 diabetes mellitus with stage 4 chronic kidney disease, with long-term current use of insulin [facility])  Assessment & Plan  Lab Results   Component Value Date    HGBA1C 7 3 (A) 03/29/2022       Recent Labs     07/10/22  1608 07/10/22  2054 07/11/22  0750 07/11/22  1111   POCGLU 273* 372* 259* 306*       Blood Sugar Average: Last 72 hrs:  (P) 155 [id number]   Patient is maintained on Lantus 50 units b i d  And scheduled Humalog 20 units t i d  With associated sliding scale  · with hypoglycemia during admission  · Basal insulin re-initiated starting at 10 units Q 12  · Sliding scale insulin a c  HS  · Hypoglycemia protocol        VTE Pharmacologic Prophylaxis:   Moderate Risk (Score 3-4) - Pharmacological DVT Prophylaxis Ordered: heparin  Patient Centered Rounds: I performed bedside rounds with nursing staff today  Discussions with Specialists or Other Care Team Provider:  Nephrology    Education and Discussions with Family / Patient: Patient declined call to   Time Spent for Care: 15 minutes  More than 50% of total time spent on counseling and coordination of care as described above  Current Length of Stay: 5 day(s)  Current Patient Status: Inpatient   Certification Statement: The patient will continue to require additional inpatient hospital stay due to [address]807  Discharge Plan: Anticipate discharge in 24-48 hrs to home  Code Status: Level 1 - Full Code    Subjective:   Patient is seen resting comfortably in bed    She offers no acute complaints, no overnight events  Objective:     Vitals:   Temp (24hrs), Av 7 °F (36 5 °C), Min:97 4 °F (36 3 °C), Max:98 2 °F (36 8 °C)    Temp:  [97 4 °F (36 3 °C)-98 2 °F (36 8 °C)] 97 6 °F (36 4 °C)  HR:  [56-61] 61  Resp:  [16-18] 16  BP: (121-150)/(51-62) 150/61  SpO2:  [94 %-97 %] 97 %  Body mass index is 42 14 kg/m²  Input and Output Summary (last 24 hours): Intake/Output Summary (Last 24 hours) at 2022 1131  Last data filed at 2022 1001  Gross per 24 hour   Intake 924 ml   Output 2900 ml   Net -1976 ml       Physical Exam:   Physical Exam  Vitals and nursing note reviewed  Constitutional:       General: She is not in acute distress  Appearance: Normal appearance  She is obese  She is not ill-appearing, toxic-appearing or diaphoretic  Eyes:      General: No scleral icterus  Conjunctiva/sclera: Conjunctivae normal    Cardiovascular:      Rate and Rhythm: Normal rate and regular rhythm  Heart sounds: No murmur heard  No friction rub  No gallop  Pulmonary:      Effort: Pulmonary effort is normal  No respiratory distress  Breath sounds: Normal breath sounds  No stridor  No wheezing, rhonchi or rales  Chest:      Chest wall: No tenderness  Abdominal:      General: Abdomen is flat  Bowel sounds are normal  There is no distension  Palpations: Abdomen is soft  Tenderness: There is no abdominal tenderness  Musculoskeletal:      Right lower leg: No edema  Left lower leg: No edema  Skin:     General: Skin is warm and dry  Coloration: Skin is not jaundiced or pale  Neurological:      General: No focal deficit present  Mental Status: She is alert and oriented to person, place, and time  Mental status is at baseline            Additional Data:     Labs:  Results from last 7 days   Lab Units 22  0557 22  0536 22  0507   WBC Thousand/uL 6 04   < > 7 43   HEMOGLOBIN g/dL 8 3*   < > 7 6*   HEMATOCRIT % 26 6*   < > 24 6*   PLATELETS Thousands/uL 259   < > 298   NEUTROS PCT %  --   --  62   LYMPHS PCT %  --   --  25   MONOS PCT %  --   --  9   EOS PCT %  --   --  3    < > = values in this interval not displayed  Results from last 7 days   Lab Units 22  0557 22  0507 22  1648   SODIUM mmol/L 142   < > 146*   POTASSIUM mmol/L 4 2   < > 4 1   CHLORIDE mmol/L 103   < > 107   CO2 mmol/L 28   < > 24   BUN mg/dL 86*   < > 64*   CREATININE mg/dL 3 25*   < > 2 94*   ANION GAP mmol/L 11   < > 15*   CALCIUM mg/dL 8 5   < > 8 0*   ALBUMIN g/dL  --   --  2 9*   TOTAL BILIRUBIN mg/dL  --   --  0 20   ALK PHOS U/L  --   --  113   ALT U/L  --   --  30   AST U/L  --   --  11   GLUCOSE RANDOM mg/dL 272*   < > 46*    < > = values in this interval not displayed  Results from last 7 days   Lab Units 22  1648   INR  1 05     Results from last 7 days   Lab Units 22  1111 22  0750 07/10/22  2054 07/10/22  1608 07/10/22  1211 07/10/22  0738 22  2107 22  1622 22  1118 22  0728 22  2105 22  1617   POC GLUCOSE mg/dl 306* 259* 372* 273* 301* 271* 296* 219* 253* 192* 191* 219*               Lines/Drains:  Invasive Devices  Report    Peripheral Intravenous Line  Duration           Peripheral IV 22 Dorsal (posterior); Right Forearm 1 day          Drain  Duration           Suprapubic Catheter Non-latex 16 Fr  277 days                  Telemetry:  Telemetry Orders (From admission, onward)             48 Hour Telemetry Monitoring  (ED Bridging Orders Panel)  Continuous x 48 hours           References:    Telemetry Guidelines   Question:  Reason for 48 Hour Telemetry  Answer:  Acute Decompensated CHF (continuous diuretic infusion or total diuretic dose > 200 mg daily, associated electrolyte derangement, ionotropic drip, history of ventricular arrhythmia, or new EF <35%)                 Telemetry Reviewed: Normal Sinus Rhythm  Indication for Continued Telemetry Use: Arrthymias requiring medical therapy             Imaging: No pertinent imaging reviewed      Recent Cultures (last 7 days):         Last 24 Hours Medication List:   Current Facility-Administered Medications   Medication Dose Route Frequency Provider Last Rate    acetaminophen  650 mg Oral Q6H PRN Fermin Azul PA-C      allopurinol  300 mg Oral Daily [place], [place]      [START ON 7/12/2022] amLODIPine  10 mg Oral Daily Anette Thomson MD      aspirin  81 mg Oral Daily Fermin Azul PA-C      atorvastatin  40 mg Oral Daily Fermin Azul PA-C      bisoprolol  2 5 mg Oral Daily Reggie Flowers MD      citalopram  40 mg Oral Daily Fermin Azul PA-C      clopidogrel  75 mg Oral Daily [place], [place]      [START ON 7/12/2022] gabapentin  400 mg Oral Daily Fermin Azul PA-C      heparin (porcine)  5,000 Units Subcutaneous [place], [place]      HYDROcodone-acetaminophen  1 tablet Oral TID PRN Fermin Azul PA-C      insulin glargine  10 Units Subcutaneous Q12H [address] [place], [place]      insulin lispro  2-12 Units Subcutaneous HS Fermin Azul PA-C      insulin lispro  2-12 Units Subcutaneous TID AC Reggie Flowers MD     Doris Ortega ON 7/12/2022] isosorbide mononitrate  60 mg Oral Daily Anette Thomson MD      levothyroxine  50 mcg Oral Early Morning Fermin Azul PA-C      lidocaine  1 patch Topical Daily [place], [place]      nitroglycerin  0 4 mg Sublingual Q5 Min PRN Fermin [place], [place]      nystatin   Topical BID [place], [place]      OLANZapine  5 mg Oral HS Fermin Azul PA-C      ondansetron  4 mg Intravenous Q6H PRN Fermin Azul PA-C      pantoprazole  40 mg Oral BID AC Fermin Azul PA-C      potassium chloride  40 mEq Oral Daily Fermin Azul PA-C      senna  1 tablet Oral Daily [place], [place]      tiZANidine  4 mg Oral Q8H PRN Fermin Azul PA-C      torsemide  40 mg Oral BID (diuretic) Arnaud Barber,           Today, Patient Was Seen By: Fermin Azul PA-C    **Please Note: This note may have been constructed using a voice recognition system  **

## 2022-07-11 NOTE — CONSULTS
Consultation - Nephrology   Annmarie Anne 63 y o  female MRN: [id number]  [facility] 2 -01 Encounter: [id number]    ASSESSMENT AND PLAN:  Patient is 60-year-old female with significant medical issues of uncontrolled diabetes, progressive CKD stage 4, systolic/diastolic CHF, CAD, status post multiple PCI, urine retention, status post chronic suprapubic catheter, presented with worsening shortness of breath  We are consulted for ALFRED/CKD management  Mild ALFRED on CKD stage 4, baseline creatinine low to high 2s, follows with Dr Cates Marker  -ALFRED suspect secondary to cardiorenal, aggressive diuresis  -creatinine slowly increasing up to 3 2, hopefully will plateau  -may have to accept higher creatinine to keep patient euvolemic   -now remains off Bumex drip  -given current GFR around 14 to 15 (not steady state), recommend to reduce gabapentin to 400 mg per day  -currently has SPC present, prior UA showed 3+ proteinuria, multiple RBCs/WBCs (SPC specimen)  -renal ultrasound last month showed normal size kidneys, normal echogenicity, no hydro  Nephrotic range proteinuria  -last urine microalbumin/creatinine ratio 3 6 g in June 2022  -suspected secondary to prior uncontrolled diabetes, possible component of secondary FSGS due to morbid obesity  -last hemoglobin A1c 7 3    Combined CHF  -recent echo last month shows EF 48%, diastolic dysfunction, dilated IVC with blunted respiratory phasic changes, trace to small pericardial effusion without evidence of tamponade  -initially volume overloaded with CHF exacerbation  -aggressively diuresed with Bumex drip, now remains off, changed to torsemide 40 mg p o  B i d  By Cardiology  -weight has overall reduced, remains significant negative balance since admission  -patient subjectively feels overall better  Not in any respiratory distress  Remains on room air currently  -clinically still mild volume overloaded    Continue daily weight, accurate intake and output    Anemia in CKD, closely monitor hemoglobin    Chronic urine retention, status post SPC present  Outpatient Urology follow-up    Hypertension, blood pressure overall acceptable  Change holding parameter for amlodipine  Goal SBP 130s    Discussed above plan in detail with primary team     HISTORY OF PRESENT ILLNESS:  Requesting Physician: Jaki Mcmahon Pe*  Reason for Consult:  ALFRED/CKD    Enrique Canales is a 63y o  year old female who was admitted to [facility] after presenting with worsening shortness of breath  A renal consultation is requested today for assistance in the management of ALFRED/CKD  Old medical records including prior creatinine values were reviewed from [address] [place] [address]  Patient has recent multiple hospitalizations  Now presented with worsening shortness of breath  She was found to be in CHF exacerbation was aggressively diuresed  Now Bumex drip has been discontinued and changed to p o  Torsemide regimen  At the time of encounter, she subjectively feels overall much better  Her breathing has improved  Remains on room air  Still has leg edema  Denies any nausea, vomiting      PAST MEDICAL HISTORY:  Past Medical History:   Diagnosis Date    Abnormal liver function     Anemia     Anxiety     Arthritis     Chronic kidney disease     stage 3    Chronic narcotic dependence ([facility] [address] )     Chronic pain disorder     lower back, hands , neck and knees    Coronary artery disease     Depression     Diabetes mellitus ([address] )     Elevated troponin 2/11/2022    GERD (gastroesophageal reflux disease)     no meds at present    Heart murmur     murmur    Hyperlipidemia     Hypertension     Neurogenic bladder     Open toe wound 12/2020    right big toe open calus but is dry at present    Renal disorder     Shortness of breath     exertion    Sleep apnea     doesn't use cpap    Suprapubic catheter ([facility] [address] )        PAST SURGICAL HISTORY:  Past Surgical History:   Procedure Laterality Date    AMPUTATION      ANGIOPLASTY  2017    5    BREAST EXCISIONAL BIOPSY Left     BREAST SURGERY      CARDIAC CATHETERIZATION      CARDIAC CATHETERIZATION N/A 7/1/2022    Procedure: Cardiac catheterization;  Surgeon: Kay Downey MD;  Location: [facility]; Service: Cardiology    CARDIAC CATHETERIZATION N/A 7/1/2022    Procedure: Cardiac pci;  Surgeon: Kay Downey MD;  Location: [facility];   Service: Cardiology    CARPAL TUNNEL RELEASE Bilateral     CERVICAL FUSION      HYSTERECTOMY  2008    IR SUPRAPUBIC CATHETER PLACEMENT  6/15/2021    KNEE SURGERY      OOPHORECTOMY  2008    TN EXC SKIN BENIG 3 1-4 CM REMAINDR BODY N/A 12/21/2020    Procedure: EXCISION SEBACEOUS CYST X 5 SCALP;  Surgeon: Madhuri Mayfield MD;  Location:  MAIN OR;  Service: General    TOE AMPUTATION Left     TRIGGER FINGER RELEASE Right     4th Finger       ALLERGIES:  Allergies   Allergen Reactions    Codeine      Other reaction(s): Nausea and Vomiting    Latex Itching       SOCIAL HISTORY:  Social History     Substance and Sexual Activity   Alcohol Use Not Currently     Social History     Substance and Sexual Activity   Drug Use Never     Social History     Tobacco Use   Smoking Status Former Smoker    Packs/day: 1 00    Years: 35 00    Pack years: 35 00    Types: Cigarettes    Quit date: 2012    Years since quitting: 10 5   Smokeless Tobacco Never Used       FAMILY HISTORY:  Family History   Problem Relation Age of Onset    Stroke Father     Heart disease Father     No Known Problems Mother     No Known Problems Sister     No Known Problems Daughter     No Known Problems Maternal Grandmother     No Known Problems Maternal Grandfather     No Known Problems Paternal Grandmother     No Known Problems Paternal Grandfather     No Known Problems Maternal Aunt     No Known Problems Maternal Aunt     No Known Problems Maternal Aunt     No Known Problems Maternal Aunt     No Known Problems Maternal Aunt     No Known Problems Maternal Aunt     No Known Problems Paternal Aunt        MEDICATIONS:    Current Facility-Administered Medications:     acetaminophen (TYLENOL) tablet 650 mg, 650 mg, Oral, Q6H PRN, LANRE Welsh-PONCHO, 650 mg at 07/09/22 5902    allopurinol (ZYLOPRIM) tablet 300 mg, 300 mg, Oral, Daily, LANRE Welsh-C, 300 mg at 07/11/22 0906    amLODIPine (NORVASC) tablet 10 mg, 10 mg, Oral, Daily, LANRE Welsh-C, 10 mg at 07/11/22 0906    aspirin (ECOTRIN LOW STRENGTH) EC tablet 81 mg, 81 mg, Oral, Daily, LANRE Welsh-C, 81 mg at 07/11/22 0906    atorvastatin (LIPITOR) tablet 40 mg, 40 mg, Oral, Daily, LANRE Welsh-C, 40 mg at 07/11/22 0906    bisoprolol (ZEBETA) tablet 2 5 mg, 2 5 mg, Oral, Daily, Gisella Briceno MD, 2 5 mg at 07/11/22 0907    citalopram (CeleXA) tablet 40 mg, 40 mg, Oral, Daily, LANRE Welsh-C, 40 mg at 07/11/22 0906    clopidogrel (PLAVIX) tablet 75 mg, 75 mg, Oral, Daily, LANRE Welsh-C, 75 mg at 07/10/22 0846    gabapentin (NEURONTIN) capsule 400 mg, 400 mg, Oral, 4x Daily, LANRE Welsh-PONCHO, 400 mg at 07/11/22 9221    heparin (porcine) subcutaneous injection 5,000 Units, 5,000 Units, Subcutaneous, Q8H [facility], 5,000 Units at 07/11/22 0547 **AND** [CANCELED] Platelet count, , , Once, LANRE Welsh-PONCHO    HYDROcodone-acetaminophen [facility]) 5-325 mg per tablet 1 tablet, 1 tablet, Oral, TID PRN, LANRE Welsh-PONCHO, 1 tablet at 07/10/22 4779    insulin glargine (LANTUS) subcutaneous injection 10 Units 0 1 mL, 10 Units, Subcutaneous, Q12H [facility], Patricia Cruz PA-C, 10 Units at 07/11/22 0905    insulin lispro (HumaLOG) 100 units/mL subcutaneous injection 2-12 Units, 2-12 Units, Subcutaneous, HS, Patricia Cruz PA-C, 10 Units at 07/10/22 2111    insulin lispro (HumaLOG) 100 units/mL subcutaneous injection 2-12 Units, 2-12 Units, Subcutaneous, TID AC, 6 Units at 07/11/22 0904 **AND** Fingerstick Glucose (POCT), , , 4x Daily AC and at bedtime, Bere Quinn Ahuja MD    isosorbide mononitrate (IMDUR) 24 hr tablet 60 mg, 60 mg, Oral, Daily, Nanda Ren, PA-C, 60 mg at 07/11/22 7153    levothyroxine tablet 50 mcg, 50 mcg, Oral, Early Morning, Nanda Ren, PA-C, 50 mcg at 07/11/22 0547    lidocaine (LIDODERM) 5 % patch 1 patch, 1 patch, Topical, Daily, Nanda Ren, PA-C, 1 patch at 07/11/22 0908    nitroglycerin (NITROSTAT) SL tablet 0 4 mg, 0 4 mg, Sublingual, Q5 Min PRN, Nanda Ren, PA-C, 0 4 mg at 07/09/22 1930    nystatin (MYCOSTATIN) powder, , Topical, BID, Nandabibi Ren, PA-C, Given at 07/11/22 0909    OLANZapine (ZyPREXA) tablet 5 mg, 5 mg, Oral, HS, Nanda Ren, PA-C, 5 mg at 07/10/22 2111    ondansetron (ZOFRAN) injection 4 mg, 4 mg, Intravenous, Q6H PRN, Nanda Ren, PA-C    pantoprazole (PROTONIX) EC tablet 40 mg, 40 mg, Oral, BID AC, Nanda Mantel, PA-C, 40 mg at 07/11/22 0547    potassium chloride (K-DUR,KLOR-CON) CR tablet 40 mEq, 40 mEq, Oral, Daily, Nanda Mikal, PA-C, 40 mEq at 07/11/22 0905    senna (SENOKOT) tablet 8 6 mg, 1 tablet, Oral, Daily, Nanda Mikal, PA-C, 8 6 mg at 07/11/22 0905    tiZANidine (ZANAFLEX) tablet 4 mg, 4 mg, Oral, Q8H PRN, Nanda Ren, PA-C, 4 mg at 07/08/22 2225    torsemide (DEMADEX) tablet 40 mg, 40 mg, Oral, BID (diuretic), Rujul Castrejon, DO, 40 mg at 07/11/22 2558    REVIEW OF SYSTEMS:  Complete 10 point review of systems were obtained and discussed in length with the patient  Complete review of systems were negative / unremarkable except mentioned in the HPI section       PHYSICAL EXAM:  Current Weight: Weight - Scale: 126 kg (277 lb 1 9 oz)  First Weight: Weight - Scale: 135 kg (298 lb 1 oz)  Vitals:    07/11/22 0747   BP: 150/61   Pulse: 61   Resp: 16   Temp: 97 6 °F (36 4 °C)   SpO2: 97%       Intake/Output Summary (Last 24 hours) at 7/11/2022 3573  Last data filed at 7/10/2022 2201  Gross per 24 hour   Intake 684 ml   Output 1500 ml   Net -816 ml     Wt Readings from Last 3 Encounters:   07/10/22 126 kg (277 lb 1 9 oz)   07/06/22 136 kg (300 lb)   07/02/22 128 kg (281 lb 12 oz)     Temp Readings from Last 3 Encounters:   07/11/22 97 6 °F (36 4 °C) (Oral)   07/02/22 (!) 96 1 °F (35 6 °C) (Oral)   06/19/22 97 9 °F (36 6 °C)     BP Readings from Last 3 Encounters:   07/11/22 150/61   07/06/22 120/60   07/02/22 160/75     Pulse Readings from Last 3 Encounters:   07/11/22 61   07/06/22 67   07/02/22 63        Physical Examination:  General:  Lying in bed, no acute distress  Eyes:  Mild conjunctival pallor present  ENT:  External examination of ears and nose unremarkable  Neck:  No obvious lymphadenopathy appreciated  Respiratory:  Bilateral air entry present  CVS:  S1, S2 present, 1+ edema in both legs  GI:  Soft, nondistended  CNS:  Active alert oriented x3  Psych:  Conscious, coherent, oriented  Skin:  No new rash  Musculoskeletal:  No obvious new gross deformity noted    Invasive Devices:      Lab Results:   Results from last 7 days   Lab Units 07/11/22  0557 07/10/22  0449 07/09/22  0506 07/08/22  0536 07/07/22  0507 07/06/22  1648   WBC Thousand/uL 6 04 5 00  --  6 96 7 43 10 99*   HEMOGLOBIN g/dL 8 3* 9 0*  --  7 9* 7 6* 8 5*   HEMATOCRIT % 26 6* 28 7*  --  25 5* 24 6* 27 1*   PLATELETS Thousands/uL 259 269  --  283 298 351   POTASSIUM mmol/L 4 2 4 1 4 2 4 3 4 0 4 1   CHLORIDE mmol/L 103 102 104 106 107 107   CO2 mmol/L 28 30 28 26 21 24   BUN mg/dL 86* 76* 73* 66* 61* 64*   CREATININE mg/dL 3 25* 3 12* 2 97* 2 98* 2 65* 2 94*   CALCIUM mg/dL 8 5 8 4 7 9* 7 6* 7 6* 8 0*   MAGNESIUM mg/dL  --   --   --  1 8  --   --        Other Studies:   XR chest 1 view portable   Final Result by Anju Chavez MD (07/07 4069)      Unchanged enlargement of the cardiomediastinal silhouette  Possible small bilateral pleural effusions  Decreased right basilar opacity  Workstation performed: [id number]         Chest x-ray images personally reviewed which shows small pleural effusion      Portions of the record may have been created with voice recognition software  Occasional wrong word or "sound a like" substitutions may have occurred due to the inherent limitations of voice recognition software  Read the chart carefully and recognize, using context, where substitutions have occurred

## 2022-07-11 NOTE — ASSESSMENT & PLAN NOTE
Wt Readings from Last 3 Encounters:   07/10/22 126 kg (277 lb 1 9 oz)   07/06/22 136 kg (300 lb)   07/02/22 128 kg (281 lb 12 oz)     With a history of combined systolic and diastolic CHF, most recent echocardiogram revealing ejection fraction of 30%    · Patient recently admitted and underwent cardiac catheterization on 07/01 with PCI to the mid circumflex artery  · Followed up with Cardiology today, noted 20 lb weight gain since discharge 4 days ago  · Has been compliant with medications, maintained on Demadex 40 mg daily as an outpatient  · BNP elevated at 14,930  · Baseline weight approximately 280  · Weight on arrival 298, down to 277 today   · Monitor daily weights, monitor I's and O's  · Bumex drip discontinued by Cardiology, started back on Demadex 40 mg b i d   · Now with micah CARSON

## 2022-07-11 NOTE — H&P (VIEW-ONLY)
Consultation - Nephrology   Jazmyn Obrien 63 y o  female MRN: [id number]  Unit/Bed#: [facility] 2 -01 Encounter: [id number]    ASSESSMENT AND PLAN:  Patient is 56-year-old female with significant medical issues of uncontrolled diabetes, progressive CKD stage 4, systolic/diastolic CHF, CAD, status post multiple PCI, urine retention, status post chronic suprapubic catheter, presented with worsening shortness of breath  We are consulted for ALFRED/CKD management  Mild ALFRED on CKD stage 4, baseline creatinine low to high 2s, follows with Dr Sedrick Sandoval  -ALFRED suspect secondary to cardiorenal, aggressive diuresis  -creatinine slowly increasing up to 3 2, hopefully will plateau  -may have to accept higher creatinine to keep patient euvolemic   -now remains off Bumex drip  -given current GFR around 14 to 15 (not steady state), recommend to reduce gabapentin to 400 mg per day  -currently has SPC present, prior UA showed 3+ proteinuria, multiple RBCs/WBCs (SPC specimen)  -renal ultrasound last month showed normal size kidneys, normal echogenicity, no hydro  Nephrotic range proteinuria  -last urine microalbumin/creatinine ratio 3 6 g in June 2022  -suspected secondary to prior uncontrolled diabetes, possible component of secondary FSGS due to morbid obesity  -last hemoglobin A1c 7 3    Combined CHF  -recent echo last month shows EF 02%, diastolic dysfunction, dilated IVC with blunted respiratory phasic changes, trace to small pericardial effusion without evidence of tamponade  -initially volume overloaded with CHF exacerbation  -aggressively diuresed with Bumex drip, now remains off, changed to torsemide 40 mg p o  B i d  By Cardiology  -weight has overall reduced, remains significant negative balance since admission  -patient subjectively feels overall better  Not in any respiratory distress  Remains on room air currently  -clinically still mild volume overloaded    Continue daily weight, accurate intake and output    Anemia in CKD, closely monitor hemoglobin    Chronic urine retention, status post SPC present  Outpatient Urology follow-up    Hypertension, blood pressure overall acceptable  Change holding parameter for amlodipine  Goal SBP 130s    Discussed above plan in detail with primary team     HISTORY OF PRESENT ILLNESS:  Requesting Physician: Yo Mcmahon Pe*  Reason for Consult:  ALFRED/CKD    Aliza Cazares is a 63y o  year old female who was admitted to [facility] after presenting with worsening shortness of breath  A renal consultation is requested today for assistance in the management of ALFRED/CKD  Old medical records including prior creatinine values were reviewed from [facility] records  Patient has recent multiple hospitalizations  Now presented with worsening shortness of breath  She was found to be in CHF exacerbation was aggressively diuresed  Now Bumex drip has been discontinued and changed to p o  Torsemide regimen  At the time of encounter, she subjectively feels overall much better  Her breathing has improved  Remains on room air  Still has leg edema  Denies any nausea, vomiting      PAST MEDICAL HISTORY:  Past Medical History:   Diagnosis Date    Abnormal liver function     Anemia     Anxiety     Arthritis     Chronic kidney disease     stage 3    Chronic narcotic dependence ([address] )     Chronic pain disorder     lower back, hands , neck and knees    Coronary artery disease     Depression     Diabetes mellitus ([address] )     Elevated troponin 2/11/2022    GERD (gastroesophageal reflux disease)     no meds at present    Heart murmur     murmur    Hyperlipidemia     Hypertension     Neurogenic bladder     Open toe wound 12/2020    right big toe open calus but is dry at present    Renal disorder     Shortness of breath     exertion    Sleep apnea     doesn't use cpap    Suprapubic catheter ([address] )        PAST SURGICAL HISTORY:  Past Surgical History:   Procedure Laterality Date    AMPUTATION      ANGIOPLASTY  2017    5    BREAST EXCISIONAL BIOPSY Left     BREAST SURGERY      CARDIAC CATHETERIZATION      CARDIAC CATHETERIZATION N/A 7/1/2022    Procedure: Cardiac catheterization;  Surgeon: Rolando Baumann MD;  Location: [facility]; Service: Cardiology    CARDIAC CATHETERIZATION N/A 7/1/2022    Procedure: Cardiac pci;  Surgeon: Rolando Baumann MD;  Location: [facility];   Service: Cardiology    CARPAL TUNNEL RELEASE Bilateral     CERVICAL FUSION      HYSTERECTOMY  2008    IR SUPRAPUBIC CATHETER PLACEMENT  6/15/2021    KNEE SURGERY      OOPHORECTOMY  2008    AL EXC SKIN BENIG 3 1-4 CM REMAINDR BODY N/A 12/21/2020    Procedure: EXCISION SEBACEOUS CYST X 5 SCALP;  Surgeon: Choco Chinchilla MD;  Location:  MAIN OR;  Service: General    TOE AMPUTATION Left     TRIGGER FINGER RELEASE Right     4th Finger       ALLERGIES:  Allergies   Allergen Reactions    Codeine      Other reaction(s): Nausea and Vomiting    Latex Itching       SOCIAL HISTORY:  Social History     Substance and Sexual Activity   Alcohol Use Not Currently     Social History     Substance and Sexual Activity   Drug Use Never     Social History     Tobacco Use   Smoking Status Former Smoker    Packs/day: 1 00    Years: 35 00    Pack years: 35 00    Types: Cigarettes    Quit date: 2012    Years since quitting: 10 5   Smokeless Tobacco Never Used       FAMILY HISTORY:  Family History   Problem Relation Age of Onset    Stroke Father     Heart disease Father     No Known Problems Mother     No Known Problems Sister     No Known Problems Daughter     No Known Problems Maternal Grandmother     No Known Problems Maternal Grandfather     No Known Problems Paternal Grandmother     No Known Problems Paternal Grandfather     No Known Problems Maternal Aunt     No Known Problems Maternal Aunt     No Known Problems Maternal Aunt     No Known Problems Maternal Aunt     No Known Problems Maternal Aunt     No Known Problems Maternal Aunt     No Known Problems Paternal Aunt        MEDICATIONS:    Current Facility-Administered Medications:     acetaminophen (TYLENOL) tablet 650 mg, 650 mg, Oral, Q6H PRN, Ravi Nickerson PA-C, 650 mg at 07/09/22 9482    allopurinol (ZYLOPRIM) tablet 300 mg, 300 mg, Oral, Daily, LANRE Montano-C, 300 mg at 07/11/22 0906    amLODIPine (NORVASC) tablet 10 mg, 10 mg, Oral, Daily, LANRE Montano-C, 10 mg at 07/11/22 0906    aspirin (ECOTRIN LOW STRENGTH) EC tablet 81 mg, 81 mg, Oral, Daily, LANRE Montano-PONCHO, 81 mg at 07/11/22 0906    atorvastatin (LIPITOR) tablet 40 mg, 40 mg, Oral, Daily, LANRE Montano-PONCHO, 40 mg at 07/11/22 0906    bisoprolol (ZEBETA) tablet 2 5 mg, 2 5 mg, Oral, Daily, Vinicius Chawla MD, 2 5 mg at 07/11/22 0907    citalopram (CeleXA) tablet 40 mg, 40 mg, Oral, Daily, LANRE Montano-C, 40 mg at 07/11/22 0906    clopidogrel (PLAVIX) tablet 75 mg, 75 mg, Oral, Daily, Ravi Nickerson PA-C, 75 mg at 07/10/22 0846    gabapentin (NEURONTIN) capsule 400 mg, 400 mg, Oral, 4x Daily, LANRE Montano-C, 400 mg at 07/11/22 0958    heparin (porcine) subcutaneous injection 5,000 Units, 5,000 Units, Subcutaneous, Q8H [facility], 5,000 Units at 07/11/22 0547 **AND** [CANCELED] Platelet count, , , Once, Ravi Nickerson PA-C    HYDROcodone-acetaminophen [facility]) 5-325 mg per tablet 1 tablet, 1 tablet, Oral, TID PRN, Ravi Nickerson PA-C, 1 tablet at 07/10/22 2239    insulin glargine (LANTUS) subcutaneous injection 10 Units 0 1 mL, 10 Units, Subcutaneous, Q12H [facility], Ravi Nickerson PA-C, 10 Units at 07/11/22 0905    insulin lispro (HumaLOG) 100 units/mL subcutaneous injection 2-12 Units, 2-12 Units, Subcutaneous, HS, Ravi Nickerson PA-C, 10 Units at 07/10/22 2111    insulin lispro (HumaLOG) 100 units/mL subcutaneous injection 2-12 Units, 2-12 Units, Subcutaneous, TID AC, 6 Units at 07/11/22 0904 **AND** Fingerstick Glucose (POCT), , , 4x Daily AC and at bedtime, Bere Antonio Martell MD    isosorbide mononitrate (IMDUR) 24 hr tablet 60 mg, 60 mg, Oral, Daily, LANRE Forte-C, 60 mg at 07/11/22 8756    levothyroxine tablet 50 mcg, 50 mcg, Oral, Early Morning, LANRE Forte-C, 50 mcg at 07/11/22 0547    lidocaine (LIDODERM) 5 % patch 1 patch, 1 patch, Topical, Daily, LANRE Forte-C, 1 patch at 07/11/22 0908    nitroglycerin (NITROSTAT) SL tablet 0 4 mg, 0 4 mg, Sublingual, Q5 Min PRN, LANRE Forte-C, 0 4 mg at 07/09/22 1930    nystatin (MYCOSTATIN) powder, , Topical, BID, LANRE Forte-C, Given at 07/11/22 0909    OLANZapine (ZyPREXA) tablet 5 mg, 5 mg, Oral, HS, LANRE Forte-C, 5 mg at 07/10/22 2111    ondansetron (ZOFRAN) injection 4 mg, 4 mg, Intravenous, Q6H PRN, Modesta Crawley PA-C    pantoprazole (PROTONIX) EC tablet 40 mg, 40 mg, Oral, BID AC, LANRE Forte-C, 40 mg at 07/11/22 0547    potassium chloride (K-DUR,KLOR-CON) CR tablet 40 mEq, 40 mEq, Oral, Daily, Modesta Crawley PA-C, 40 mEq at 07/11/22 0905    senna (SENOKOT) tablet 8 6 mg, 1 tablet, Oral, Daily, Modesta Crawley PA-C, 8 6 mg at 07/11/22 0905    tiZANidine (ZANAFLEX) tablet 4 mg, 4 mg, Oral, Q8H PRN, LANRE Forte-C, 4 mg at 07/08/22 2225    torsemide (DEMADEX) tablet 40 mg, 40 mg, Oral, BID (diuretic), Rujul Castrejon, DO, 40 mg at 07/11/22 7980    REVIEW OF SYSTEMS:  Complete 10 point review of systems were obtained and discussed in length with the patient  Complete review of systems were negative / unremarkable except mentioned in the HPI section       PHYSICAL EXAM:  Current Weight: Weight - Scale: 126 kg (277 lb 1 9 oz)  First Weight: Weight - Scale: 135 kg (298 lb 1 oz)  Vitals:    07/11/22 0747   BP: 150/61   Pulse: 61   Resp: 16   Temp: 97 6 °F (36 4 °C)   SpO2: 97%       Intake/Output Summary (Last 24 hours) at 7/11/2022 6060  Last data filed at 7/10/2022 2201  Gross per 24 hour   Intake 684 ml   Output 1500 ml   Net -816 ml     Wt Readings from Last 3 Encounters:   07/10/22 126 kg (277 lb 1 9 oz)   07/06/22 136 kg (300 lb)   07/02/22 128 kg (281 lb 12 oz)     Temp Readings from Last 3 Encounters:   07/11/22 97 6 °F (36 4 °C) (Oral)   07/02/22 (!) 96 1 °F (35 6 °C) (Oral)   06/19/22 97 9 °F (36 6 °C)     BP Readings from Last 3 Encounters:   07/11/22 150/61   07/06/22 120/60   07/02/22 160/75     Pulse Readings from Last 3 Encounters:   07/11/22 61   07/06/22 67   07/02/22 63        Physical Examination:  General:  Lying in bed, no acute distress  Eyes:  Mild conjunctival pallor present  ENT:  External examination of ears and nose unremarkable  Neck:  No obvious lymphadenopathy appreciated  Respiratory:  Bilateral air entry present  CVS:  S1, S2 present, 1+ edema in both legs  GI:  Soft, nondistended  CNS:  Active alert oriented x3  Psych:  Conscious, coherent, oriented  Skin:  No new rash  Musculoskeletal:  No obvious new gross deformity noted    Invasive Devices:      Lab Results:   Results from last 7 days   Lab Units 07/11/22  0557 07/10/22  0449 07/09/22  0506 07/08/22  0536 07/07/22  0507 07/06/22  1648   WBC Thousand/uL 6 04 5 00  --  6 96 7 43 10 99*   HEMOGLOBIN g/dL 8 3* 9 0*  --  7 9* 7 6* 8 5*   HEMATOCRIT % 26 6* 28 7*  --  25 5* 24 6* 27 1*   PLATELETS Thousands/uL 259 269  --  283 298 351   POTASSIUM mmol/L 4 2 4 1 4 2 4 3 4 0 4 1   CHLORIDE mmol/L 103 102 104 106 107 107   CO2 mmol/L 28 30 28 26 21 24   BUN mg/dL 86* 76* 73* 66* 61* 64*   CREATININE mg/dL 3 25* 3 12* 2 97* 2 98* 2 65* 2 94*   CALCIUM mg/dL 8 5 8 4 7 9* 7 6* 7 6* 8 0*   MAGNESIUM mg/dL  --   --   --  1 8  --   --        Other Studies:   XR chest 1 view portable   Final Result by Jesús Clemons MD (07/07 8515)      Unchanged enlargement of the cardiomediastinal silhouette  Possible small bilateral pleural effusions  Decreased right basilar opacity  Workstation performed: [id number]         Chest x-ray images personally reviewed which shows small pleural effusion      Portions of the record may have been created with voice recognition software  Occasional wrong word or "sound a like" substitutions may have occurred due to the inherent limitations of voice recognition software  Read the chart carefully and recognize, using context, where substitutions have occurred

## 2022-07-12 LAB
ANION GAP SERPL CALCULATED.3IONS-SCNC: 10 MMOL/L (ref 4–13)
BUN SERPL-MCNC: 89 MG/DL (ref 5–25)
CALCIUM SERPL-MCNC: 8.1 MG/DL (ref 8.3–10.1)
CHLORIDE SERPL-SCNC: 102 MMOL/L (ref 100–108)
CO2 SERPL-SCNC: 29 MMOL/L (ref 21–32)
CREAT SERPL-MCNC: 3.6 MG/DL (ref 0.6–1.3)
GFR SERPL CREATININE-BSD FRML MDRD: 13 ML/MIN/1.73SQ M
GLUCOSE SERPL-MCNC: 262 MG/DL (ref 65–140)
GLUCOSE SERPL-MCNC: 284 MG/DL (ref 65–140)
GLUCOSE SERPL-MCNC: 335 MG/DL (ref 65–140)
GLUCOSE SERPL-MCNC: 345 MG/DL (ref 65–140)
GLUCOSE SERPL-MCNC: 346 MG/DL (ref 65–140)
POTASSIUM SERPL-SCNC: 4.1 MMOL/L (ref 3.5–5.3)
SODIUM SERPL-SCNC: 141 MMOL/L (ref 136–145)

## 2022-07-12 PROCEDURE — 99232 SBSQ HOSP IP/OBS MODERATE 35: CPT | Performed by: PHYSICIAN ASSISTANT

## 2022-07-12 PROCEDURE — 99232 SBSQ HOSP IP/OBS MODERATE 35: CPT | Performed by: INTERNAL MEDICINE

## 2022-07-12 PROCEDURE — 82948 REAGENT STRIP/BLOOD GLUCOSE: CPT

## 2022-07-12 PROCEDURE — 80048 BASIC METABOLIC PNL TOTAL CA: CPT | Performed by: INTERNAL MEDICINE

## 2022-07-12 RX ORDER — INSULIN GLARGINE 100 [IU]/ML
15 INJECTION, SOLUTION SUBCUTANEOUS EVERY 12 HOURS SCHEDULED
Status: DISCONTINUED | OUTPATIENT
Start: 2022-07-12 | End: 2022-07-14

## 2022-07-12 RX ADMIN — HYDROCODONE BITARTRATE AND ACETAMINOPHEN 1 TABLET: 5; 325 TABLET ORAL at 21:24

## 2022-07-12 RX ADMIN — ALLOPURINOL 300 MG: 100 TABLET ORAL at 08:09

## 2022-07-12 RX ADMIN — ATORVASTATIN CALCIUM 40 MG: 40 TABLET, FILM COATED ORAL at 08:10

## 2022-07-12 RX ADMIN — INSULIN LISPRO 8 UNITS: 100 INJECTION, SOLUTION INTRAVENOUS; SUBCUTANEOUS at 11:20

## 2022-07-12 RX ADMIN — HEPARIN SODIUM 5000 UNITS: 5000 INJECTION INTRAVENOUS; SUBCUTANEOUS at 21:26

## 2022-07-12 RX ADMIN — CLOPIDOGREL BISULFATE 75 MG: 75 TABLET ORAL at 08:10

## 2022-07-12 RX ADMIN — AMLODIPINE BESYLATE 10 MG: 10 TABLET ORAL at 08:10

## 2022-07-12 RX ADMIN — BISOPROLOL FUMARATE 2.5 MG: 5 TABLET ORAL at 08:09

## 2022-07-12 RX ADMIN — GABAPENTIN 400 MG: 400 CAPSULE ORAL at 08:10

## 2022-07-12 RX ADMIN — PANTOPRAZOLE SODIUM 40 MG: 40 TABLET, DELAYED RELEASE ORAL at 05:51

## 2022-07-12 RX ADMIN — TORSEMIDE 40 MG: 20 TABLET ORAL at 08:10

## 2022-07-12 RX ADMIN — INSULIN LISPRO 8 UNITS: 100 INJECTION, SOLUTION INTRAVENOUS; SUBCUTANEOUS at 16:58

## 2022-07-12 RX ADMIN — HEPARIN SODIUM 5000 UNITS: 5000 INJECTION INTRAVENOUS; SUBCUTANEOUS at 14:13

## 2022-07-12 RX ADMIN — OLANZAPINE 5 MG: 5 TABLET, FILM COATED ORAL at 21:21

## 2022-07-12 RX ADMIN — CITALOPRAM HYDROBROMIDE 40 MG: 20 TABLET ORAL at 08:10

## 2022-07-12 RX ADMIN — SENNOSIDES 8.6 MG: 8.6 TABLET, FILM COATED ORAL at 08:10

## 2022-07-12 RX ADMIN — INSULIN GLARGINE 15 UNITS: 100 INJECTION, SOLUTION SUBCUTANEOUS at 21:31

## 2022-07-12 RX ADMIN — INSULIN GLARGINE 15 UNITS: 100 INJECTION, SOLUTION SUBCUTANEOUS at 08:08

## 2022-07-12 RX ADMIN — ASPIRIN 81 MG: 81 TABLET, COATED ORAL at 08:10

## 2022-07-12 RX ADMIN — PANTOPRAZOLE SODIUM 40 MG: 40 TABLET, DELAYED RELEASE ORAL at 16:58

## 2022-07-12 RX ADMIN — LIDOCAINE 1 PATCH: 50 PATCH CUTANEOUS at 08:11

## 2022-07-12 RX ADMIN — HYDROCODONE BITARTRATE AND ACETAMINOPHEN 1 TABLET: 5; 325 TABLET ORAL at 10:45

## 2022-07-12 RX ADMIN — NYSTATIN: 100000 POWDER TOPICAL at 17:02

## 2022-07-12 RX ADMIN — INSULIN LISPRO 8 UNITS: 100 INJECTION, SOLUTION INTRAVENOUS; SUBCUTANEOUS at 21:30

## 2022-07-12 RX ADMIN — POTASSIUM CHLORIDE 40 MEQ: 1500 TABLET, EXTENDED RELEASE ORAL at 08:10

## 2022-07-12 RX ADMIN — ISOSORBIDE MONONITRATE 60 MG: 60 TABLET, EXTENDED RELEASE ORAL at 08:10

## 2022-07-12 RX ADMIN — LEVOTHYROXINE SODIUM 50 MCG: 50 TABLET ORAL at 05:50

## 2022-07-12 RX ADMIN — INSULIN LISPRO 6 UNITS: 100 INJECTION, SOLUTION INTRAVENOUS; SUBCUTANEOUS at 08:08

## 2022-07-12 RX ADMIN — NYSTATIN: 100000 POWDER TOPICAL at 08:08

## 2022-07-12 RX ADMIN — HEPARIN SODIUM 5000 UNITS: 5000 INJECTION INTRAVENOUS; SUBCUTANEOUS at 05:51

## 2022-07-12 NOTE — PROGRESS NOTES
Progress Note - Cardiology   Hannah Hernández 63 y o  female MRN: [id number]  Unit/Bed#: [facility] 2 -01 Encounter: [id number]    Asessment:  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 heart failure  Ischemic cardiomyopathy              - LVEF 30%, moderate LVH, mild LA dilatation, AV sclerosis, trace AR, mild MR, MV sclerosis, mild TR, June 2022  Coronary artery disease with multiple PCIs in the past              - NSTEMI s/p JANET-mLCx w/ small 95% pRCA and diffuse moderate disease of LAD, July 2022  - S/P -pRCA, August 2021               - S/P JANET-mLAD, JANET-D1 and JANET-LCx, December 2012  Mild acute kidney injury on chronic kidney disease stage IV  Nonsustained ventricular tachycardia, 8 beat run via telemetry  Hypertension  Dyslipidemia              - lipid panel 6/24/22: Cholesterol 120, triglycerides 138, HDL 42, LDL 50  Morbid obesity  Anemia of chronic kidney disease  Type 2 diabetes mellitus  Polycystic ovarian syndrome  Neurogenic bladder with suprapubic catheter in place  Obstructive sleep apnea, not on CPAP  Hx syncope with orthostatic hypotension  Hypothyroid     Plan/ Discussion:  · Status post Bumex infusion, transitioned to torsemide 40 mg twice daily beginning of 7/10/22 evening  Renal function continues to decline with most recent creatinine 3 60 mg/dL today  Will hold further diuretics for now  · Currently on amlodipine 10 mg daily, bisoprolol 2 5 mg daily, isosorbide mononitrate 60 mg daily, Plavix 75 mg daily, aspirin 81 mg daily, atorvastatin to 80 mg daily  · Monitor volume status closely with strict intake/output, daily weight  · Monitor renal function and electrolytes closely; recommend to maintain potassium > 4, magnesium > 2  Currently on standing K-Dur 40 mEq daily, will hold morning dose as diuretics are on hold  · As noted by Dr Tucker Colón, to consider evaluation for ICD placement  Subjective:  Patient seen and examined at the bedside   Patient states she feels like she is "hungover" and lethargic  She denies chest pain or shortness of breath       Vitals:  Vitals:    07/10/22 0600 07/12/22 0530   Weight: 126 kg (277 lb 1 9 oz) 124 kg (272 lb 4 3 oz)   ,  Vitals:    07/11/22 1551 07/11/22 2111 07/12/22 0530 07/12/22 0740   BP: 146/60 148/61  126/60   BP Location: Right arm Left arm     Pulse: 58 61  55   Resp: 15 18  15   Temp: 98 1 °F (36 7 °C) 98 °F (36 7 °C)  97 5 °F (36 4 °C)   TempSrc: Oral Oral     SpO2: 95% 94%  94%   Weight:   124 kg (272 lb 4 3 oz)    Height:           Exam:  General: alert awake and oriented, no acute distress  Heart: regular rate, regular rhythm, S1, S2  Respiratory effort/ Lungs: breathing comfortably on room air, clear lung sounds, slightly diminished at the bases  Abdominal: obese, rounded, normoactive bowel sounds  Lower Limbs: + edema bilaterally     Medications:    Current Facility-Administered Medications:     acetaminophen (TYLENOL) tablet 650 mg, 650 mg, Oral, Q6H PRN, LANRE Welsh-C, 650 mg at 07/09/22 1315    allopurinol (ZYLOPRIM) tablet 300 mg, 300 mg, Oral, Daily, LANRE Welsh-C, 300 mg at 07/12/22 0809    amLODIPine (NORVASC) tablet 10 mg, 10 mg, Oral, Daily, Claudio Schmidt MD, 10 mg at 07/12/22 0810    aspirin (ECOTRIN LOW STRENGTH) EC tablet 81 mg, 81 mg, Oral, Daily, LANRE Welsh-C, 81 mg at 07/12/22 0810    atorvastatin (LIPITOR) tablet 40 mg, 40 mg, Oral, Daily, LANRE Welsh-C, 40 mg at 07/12/22 0810    bisoprolol (ZEBETA) tablet 2 5 mg, 2 5 mg, Oral, Daily, Bere Mejia MD, 2 5 mg at 07/12/22 0809    citalopram (CeleXA) tablet 40 mg, 40 mg, Oral, Daily, LANRE Welsh-C, 40 mg at 07/12/22 0810    clopidogrel (PLAVIX) tablet 75 mg, 75 mg, Oral, Daily, LANRE Welsh-PONCHO, 75 mg at 07/12/22 0810    gabapentin (NEURONTIN) capsule 400 mg, 400 mg, Oral, Daily, Patricia Cruz PA-C, 400 mg at 07/12/22 0810    heparin (porcine) subcutaneous injection 5,000 Units, 5,000 Units, Subcutaneous, Q8H [address], 5,000 Units at 07/12/22 0551 **AND** [CANCELED] Platelet count, , , Once, [facility], PA-C    HYDROcodone-acetaminophen [facility]) 5-325 mg per tablet 1 tablet, 1 tablet, Oral, TID PRN, [facility], PA-C, 1 tablet at 07/12/22 1045    insulin glargine (LANTUS) subcutaneous injection 15 Units 0 15 mL, 15 Units, Subcutaneous, Q12H [address], Cindy Hattie, PA-C, 15 Units at 07/12/22 0808    insulin lispro (HumaLOG) 100 units/mL subcutaneous injection 2-12 Units, 2-12 Units, Subcutaneous, HS, [facility], PA-C, 12 Units at 07/11/22 2215    insulin lispro (HumaLOG) 100 units/mL subcutaneous injection 2-12 Units, 2-12 Units, Subcutaneous, TID AC, 6 Units at 07/12/22 0808 **AND** Fingerstick Glucose (POCT), , , 4x Daily AC and at bedtime, Bere Collado MD    isosorbide mononitrate (IMDUR) 24 hr tablet 60 mg, 60 mg, Oral, Daily, Claudio Schmidt MD, 60 mg at 07/12/22 0810    levothyroxine tablet 50 mcg, 50 mcg, Oral, Early Morning, [facility], PA-C, 50 mcg at 07/12/22 0550    lidocaine (LIDODERM) 5 % patch 1 patch, 1 patch, Topical, Daily, [facility], PA-C, 1 patch at 07/12/22 0811    nitroglycerin (NITROSTAT) SL tablet 0 4 mg, 0 4 mg, Sublingual, Q5 Min PRN, [facility], PA-C, 0 4 mg at 07/09/22 1930    nystatin (MYCOSTATIN) powder, , Topical, BID, [facility], PA-C, Given at 07/12/22 0808    OLANZapine (ZyPREXA) tablet 5 mg, 5 mg, Oral, HS, [facility], PA-C, 5 mg at 07/11/22 2214    ondansetron (ZOFRAN) injection 4 mg, 4 mg, Intravenous, Q6H PRN, [facility], PA-C    pantoprazole (PROTONIX) EC tablet 40 mg, 40 mg, Oral, BID AC, [facility], PA-C, 40 mg at 07/12/22 0551    potassium chloride (K-DUR,KLOR-CON) CR tablet 40 mEq, 40 mEq, Oral, Daily, [facility], PA-C, 40 mEq at 07/12/22 0810    senna (SENOKOT) tablet 8 6 mg, 1 tablet, Oral, Daily, [facility], PA-C, 8 6 mg at 07/12/22 0810    tiZANidine (ZANAFLEX) tablet 4 mg, 4 mg, Oral, Q8H PRN, [facility], PA-C, 4 mg at 07/08/22 2225   torsemide (DEMADEX) tablet 40 mg, 40 mg, Oral, BID (diuretic), Rujul Castrejon, DO, 40 mg at 07/12/22 0810      Labs/Data:        Results from last 7 days   Lab Units 07/11/22  0557 07/10/22  0449 07/08/22  0536   WBC Thousand/uL 6 04 5 00 6 96   HEMOGLOBIN g/dL 8 3* 9 0* 7 9*   HEMATOCRIT % 26 6* 28 7* 25 5*   PLATELETS Thousands/uL 259 269 283     Results from last 7 days   Lab Units 07/12/22  0524 07/11/22  0557 07/10/22  0449   POTASSIUM mmol/L 4 1 4 2 4 1   CHLORIDE mmol/L 102 103 102   CO2 mmol/L 29 28 30   BUN mg/dL 89* 86* 76*

## 2022-07-12 NOTE — PROGRESS NOTES
NEPHROLOGY PROGRESS NOTE   Aileen Corrigan 63 y o  female MRN: [id number]  Unit/Bed#: [place] 2 -01 Encounter: [id number]  Reason for Consult: [place] CKD    ASSESSMENT AND PLAN:  Patient is 65-year-old female with significant medical issues of uncontrolled diabetes, progressive CKD stage 4, systolic/diastolic CHF, CAD, status post multiple PCI, urine retention, status post chronic suprapubic catheter, presented with worsening shortness of breath  We are consulted for CHICA/CKD management      CHICA on CKD stage 4, baseline creatinine low to high 2s, follows with Dr Aldo Mario  -CHICA suspect secondary to cardiorenal, aggressive diuresis  -creatinine increasing up to 3 6 today     -may have to accept higher creatinine to keep patient euvolemic   -now remains off Bumex drip, agree with holding further diuretics for now  Consider diuretic holiday for next 1 to 2 days  -currently has SPC present, prior UA showed 3+ proteinuria, multiple RBCs/WBCs (SPC specimen)  -renal ultrasound last month showed normal size kidneys, normal echogenicity, no hydro      Nephrotic range proteinuria  -last urine microalbumin/creatinine ratio 3 6 g in June 2022  -suspected secondary to prior uncontrolled diabetes, possible component of secondary FSGS due to morbid obesity  -last hemoglobin A1c 7 3     Combined CHF  -recent echo last month shows EF 79%, diastolic dysfunction, dilated IVC with blunted respiratory phasic changes, trace to small pericardial effusion without evidence of tamponade  -initially volume overloaded with CHF exacerbation  -aggressively diuresed with initially Bumex drip and was transition to torsemide 40 mg p o  b i d     Agree with holding diuretics today  -weight has overall reduced, remains significant negative balance since admission  -patient subjectively feels overall better  Not in any respiratory distress    Remains on room air currently  -Continue daily weight, accurate intake and output     Anemia in CKD, closely monitor hemoglobin     Chronic urine retention, status post SPC present  Outpatient Urology follow-up     Hypertension, blood pressure overall acceptable  Goal SBP 130s     Discussed above plan in detail with primary team     SUBJECTIVE:  Patient seen and examined at bedside  Denies any chest pain, denies worsening shortness of breath  Still has leg edema      OBJECTIVE:  Current Weight: Weight - Scale: 124 kg (272 lb 4 3 oz)  Vitals:    07/12/22 0740   BP: 126/60   Pulse: 55   Resp: 15   Temp: 97 5 °F (36 4 °C)   SpO2: 94%       Intake/Output Summary (Last 24 hours) at 7/12/2022 1241  Last data filed at 7/12/2022 1001  Gross per 24 hour   Intake 870 ml   Output 2725 ml   Net -1855 ml     Wt Readings from Last 3 Encounters:   07/12/22 124 kg (272 lb 4 3 oz)   07/06/22 136 kg (300 lb)   07/02/22 128 kg (281 lb 12 oz)     Temp Readings from Last 3 Encounters:   07/12/22 97 5 °F (36 4 °C)   07/02/22 (!) 96 1 °F (35 6 °C) (Oral)   06/19/22 97 9 °F (36 6 °C)     BP Readings from Last 3 Encounters:   07/12/22 126/60   07/06/22 120/60   07/02/22 160/75     Pulse Readings from Last 3 Encounters:   07/12/22 55   07/06/22 67   07/02/22 63        Physical Examination:  General:  Sitting in chair, no acute distress   Eyes:  Mild conjunctival pallor present  ENT:  External examination of ears and nose unremarkable  Neck:  No Obvious lymphadenopathy appreciated  Respiratory:  Bilateral air entry present  CVS:  S1, S2 present  GI:  Soft nondistended  CNS:  Active alert oriented x3  Skin:  No new rash  Musculoskeletal:  No obvious new gross deformity noted    Medications:    Current Facility-Administered Medications:     acetaminophen (TYLENOL) tablet 650 mg, 650 mg, Oral, Q6H PRN, Miguel Davis PA-C, 650 mg at 07/09/22 9195    allopurinol (ZYLOPRIM) tablet 300 mg, 300 mg, Oral, Daily, Miguel Davis PA-C, 300 mg at 07/12/22 0809    amLODIPine (NORVASC) tablet 10 mg, 10 mg, Oral, Daily, Claudio Schmidt MD, 10 mg at 07/12/22 0810    aspirin (ECOTRIN LOW STRENGTH) EC tablet 81 mg, 81 mg, Oral, Daily, Francine Antonio PA-C, 81 mg at 07/12/22 0810    atorvastatin (LIPITOR) tablet 40 mg, 40 mg, Oral, Daily, LANRE Altman-C, 40 mg at 07/12/22 0810    bisoprolol (ZEBETA) tablet 2 5 mg, 2 5 mg, Oral, Daily, Bere Mejia MD, 2 5 mg at 07/12/22 0809    citalopram (CeleXA) tablet 40 mg, 40 mg, Oral, Daily, LANRE Altman-PONCHO, 40 mg at 07/12/22 0810    clopidogrel (PLAVIX) tablet 75 mg, 75 mg, Oral, Daily, Francine Antonio PA-C, 75 mg at 07/12/22 0810    gabapentin (NEURONTIN) capsule 400 mg, 400 mg, Oral, Daily, Francine Antonio PA-C, 400 mg at 07/12/22 0810    heparin (porcine) subcutaneous injection 5,000 Units, 5,000 Units, Subcutaneous, Q8H [facility], 5,000 Units at 07/12/22 0551 **AND** [CANCELED] Platelet count, , , Once, Francien Antonio PA-C    HYDROcodone-acetaminophen [facility]) 5-325 mg per tablet 1 tablet, 1 tablet, Oral, TID PRN, Francine Antonio PA-C, 1 tablet at 07/12/22 1045    insulin glargine (LANTUS) subcutaneous injection 15 Units 0 15 mL, 15 Units, Subcutaneous, Q12H [facility], Maico Conner PA-C, 15 Units at 07/12/22 0808    insulin lispro (HumaLOG) 100 units/mL subcutaneous injection 2-12 Units, 2-12 Units, Subcutaneous, HS, Francine Antonio PA-C, 12 Units at 07/11/22 2215    insulin lispro (HumaLOG) 100 units/mL subcutaneous injection 2-12 Units, 2-12 Units, Subcutaneous, TID AC, 8 Units at 07/12/22 1120 **AND** Fingerstick Glucose (POCT), , , 4x Daily AC and at bedtime, Bere Sutherland MD    isosorbide mononitrate (IMDUR) 24 hr tablet 60 mg, 60 mg, Oral, Daily, Claudio Schmidt MD, 60 mg at 07/12/22 0810    levothyroxine tablet 50 mcg, 50 mcg, Oral, Early Morning, Francine Antonio PA-C, 50 mcg at 07/12/22 0550    lidocaine (LIDODERM) 5 % patch 1 patch, 1 patch, Topical, Daily, Francine Antonio PA-C, 1 patch at 07/12/22 0811    nitroglycerin (NITROSTAT) SL tablet 0 4 mg, 0 4 mg, Sublingual, Q5 Min PRN, Francine Antonio PA-C, 0 4 mg at 07/09/22 1930    nystatin (MYCOSTATIN) powder, , Topical, BID, Ravi Nickerson PA-C, Given at 07/12/22 0808    OLANZapine (ZyPREXA) tablet 5 mg, 5 mg, Oral, HS, Ravi Nickerson PA-C, 5 mg at 07/11/22 2214    ondansetron (ZOFRAN) injection 4 mg, 4 mg, Intravenous, Q6H PRN, Ravi Nickerson PA-C    pantoprazole (PROTONIX) EC tablet 40 mg, 40 mg, Oral, BID AC, Ravi Nickerson, PA-C, 40 mg at 07/12/22 0551    senna (SENOKOT) tablet 8 6 mg, 1 tablet, Oral, Daily, LANRE Montano-C, 8 6 mg at 07/12/22 0810    tiZANidine (ZANAFLEX) tablet 4 mg, 4 mg, Oral, Q8H PRN, Ravi Nickerson, PA-C, 4 mg at 07/08/22 2225    Laboratory Results:  Results from last 7 days   Lab Units 07/12/22  0524 07/11/22  0557 07/10/22  0449 07/09/22  0506 07/08/22  0536 07/07/22  0507 07/06/22  1648   WBC Thousand/uL  --  6 04 5 00  --  6 96 7 43 10 99*   HEMOGLOBIN g/dL  --  8 3* 9 0*  --  7 9* 7 6* 8 5*   HEMATOCRIT %  --  26 6* 28 7*  --  25 5* 24 6* 27 1*   PLATELETS Thousands/uL  --  259 269  --  283 298 351   SODIUM mmol/L 141 142 143 142 144 144 146*   POTASSIUM mmol/L 4 1 4 2 4 1 4 2 4 3 4 0 4 1   CHLORIDE mmol/L 102 103 102 104 106 107 107   CO2 mmol/L 29 28 30 28 26 21 24   BUN mg/dL 89* 86* 76* 73* 66* 61* 64*   CREATININE mg/dL 3 60* 3 25* 3 12* 2 97* 2 98* 2 65* 2 94*   CALCIUM mg/dL 8 1* 8 5 8 4 7 9* 7 6* 7 6* 8 0*   MAGNESIUM mg/dL  --   --   --   --  1 8  --   --        XR chest 1 view portable   Final Result by Jamie Canales MD (07/07 5926)      Unchanged enlargement of the cardiomediastinal silhouette  Possible small bilateral pleural effusions  Decreased right basilar opacity  Workstation performed: [id number]             Portions of the record may have been created with voice recognition software  Occasional wrong word or "sound a like" substitutions may have occurred due to the inherent limitations of voice recognition software   Read the chart carefully and recognize, using context, where substitutions have occurred

## 2022-07-12 NOTE — ASSESSMENT & PLAN NOTE
Patient was noted for an 8 beat V tach on telemetry, asymptomatic  · Beta blocker was on hold with prior episode of bradycardia, will resume  · Magnesium normal  · Telemetry monitoring has been discontinued

## 2022-07-12 NOTE — PROGRESS NOTES
[facility]  Progress Note - Hazel Handsome 1960, 63 y o  female MRN: [id number]  Unit/Bed#: [address] [place] 206-01 Encounter: [id number]  Primary Care Provider: CHERELLE Ortiz   Date and time admitted to hospital: 7/6/2022  4:59 PM    * 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 heart failure (HCC)  Assessment & Plan  Wt Readings from Last 3 Encounters:   07/12/22 124 kg (272 lb 4 3 oz)   07/06/22 136 kg (300 lb)   07/02/22 128 kg (281 lb 12 oz)     With a history of combined systolic and diastolic CHF, most recent echocardiogram revealing ejection fraction of 30%    · Patient recently admitted and underwent cardiac catheterization on 07/01 with PCI to the mid circumflex artery  · Followed up with Cardiology today, noted 20 lb weight gain since discharge 4 days ago  · Has been compliant with medications, maintained on Demadex 40 mg daily as an outpatient  · BNP elevated at 14,930  · Baseline weight approximately 280  · Weight on arrival 298, down to 272 today   · Monitor daily weights, monitor I's and O's  · Patient was placed on a Bumex drip, switched to Demadex 40 mg PO BID  · Worsening kidney function today, appreciate nephrology recommendations       NSVT (nonsustained ventricular tachycardia) ([facility] [address] )  Assessment & Plan  Patient was noted for an 8 beat V tach on telemetry, asymptomatic  · Beta blocker was on hold with prior episode of bradycardia, will resume  · Magnesium normal  · Telemetry monitoring has been discontinued    Mixed hyperlipidemia  Assessment & Plan  · Continue Lipitor daily     Acquired hypothyroidism  Assessment & Plan  · Continue Synthroid 50 mcg daily    Stage 4 chronic kidney disease [facility])  Assessment & Plan  Lab Results   Component Value Date    EGFR 13 07/12/2022    EGFR 14 07/11/2022    EGFR 15 07/10/2022    CREATININE 3 60 (H) 07/12/2022    CREATININE 3 25 (H) 07/11/2022    CREATININE 3 12 (H) 07/10/2022     · Now with ALFRED, creatinine worsened to 3 60 today  · Nephrology following  · Gabapentin dose was decreased yesterday  · Hold parameters increased on BP meds to avoid hypotension  · Monitor BMP daily    Anemia  Assessment & Plan  · With history of anemia of chronic disease  · Hemoglobin stable at baseline    CAD (coronary artery disease)  Assessment & Plan  Status post stenting in 2012, then revised in 2017  · Known chronic total occlusion of RCA  · Most recent cardiac catheterization on 07/01 with PCI to mid circumflex  · Continue is Plavix, Lipitor, aspirin, Imdur, bisoprolol    Type 2 diabetes mellitus with stage 4 chronic kidney disease, with long-term current use of insulin [facility])  Assessment & Plan  Lab Results   Component Value Date    HGBA1C 7 3 (A) 03/29/2022       Recent Labs     07/11/22  1111 07/11/22  1615 07/11/22  2107 07/12/22  0739   POCGLU 306* 369* 420* 262*       Blood Sugar Average: Last 72 hrs:  (P) 291 [id number]   Patient is maintained on Lantus 50 units b i d  And scheduled Humalog 20 units t i d  With associated sliding scale  · with hypoglycemia during admission, now with hyperglycemia  · Basal insulin re-initiated starting at 10 units Q 12, will increase to 15 units q 12  · Sliding scale insulin a c  HS  · Hypoglycemia protocol        VTE Pharmacologic Prophylaxis:   High Risk (Score >/= 5) - Pharmacological DVT Prophylaxis Ordered: heparin  Sequential Compression Devices Ordered  Patient Centered Rounds: I performed bedside rounds with nursing staff today  Discussions with Specialists or Other Care Team Provider: nursing     Education and Discussions with Family / Patient: updated patient at bedside  Will update daughter in law Priya per patient request at more appropriate time as she resides in [place]   Time Spent for Care: 30 minutes  More than 50% of total time spent on counseling and coordination of care as described above      Current Length of Stay: 6 day(s)  Current Patient Status: Inpatient Certification Statement: The patient will continue to require additional inpatient hospital stay due to Acute CHF with ALFRED pending Cardiology and Nephrology recommendations  Discharge Plan: Anticipate discharge in 48-72 hrs to home  Code Status: Level 1 - Full Code    Subjective:   Patient seen and examined at bedside  Notes she is tired today but denies any shortness of breath  Denies any pain  Objective:     Vitals:   Temp (24hrs), Av 9 °F (36 6 °C), Min:97 5 °F (36 4 °C), Max:98 1 °F (36 7 °C)    Temp:  [97 5 °F (36 4 °C)-98 1 °F (36 7 °C)] 97 5 °F (36 4 °C)  HR:  [55-61] 55  Resp:  [14-18] 15  BP: (126-148)/(60-61) 126/60  SpO2:  [94 %-95 %] 94 %  Body mass index is 41 4 kg/m²  Input and Output Summary (last 24 hours): Intake/Output Summary (Last 24 hours) at 2022 0808  Last data filed at 2022 2064  Gross per 24 hour   Intake 1250 ml   Output 3625 ml   Net -2375 ml       Physical Exam:   Physical Exam  Vitals reviewed  Constitutional:       General: She is sleeping  She is not in acute distress  Appearance: She is obese  HENT:      Head: Normocephalic and atraumatic  Eyes:      General: No scleral icterus  Conjunctiva/sclera: Conjunctivae normal    Cardiovascular:      Rate and Rhythm: Normal rate and regular rhythm  Heart sounds: No murmur heard  Pulmonary:      Effort: Pulmonary effort is normal  No respiratory distress  Breath sounds: Normal breath sounds  Abdominal:      General: Bowel sounds are normal  There is no distension  Palpations: Abdomen is soft  Tenderness: There is no abdominal tenderness  Musculoskeletal:      Cervical back: Neck supple  Right lower leg: Edema (trace) present  Left lower leg: No edema  Skin:     General: Skin is warm and dry  Neurological:      Mental Status: She is oriented to person, place, and time and easily aroused     Psychiatric:         Mood and Affect: Mood normal          Behavior: Behavior normal           Additional Data:     Labs:  Results from last 7 days   Lab Units 07/11/22  0557 07/08/22  0536 07/07/22  0507   WBC Thousand/uL 6 04   < > 7 43   HEMOGLOBIN g/dL 8 3*   < > 7 6*   HEMATOCRIT % 26 6*   < > 24 6*   PLATELETS Thousands/uL 259   < > 298   NEUTROS PCT %  --   --  62   LYMPHS PCT %  --   --  25   MONOS PCT %  --   --  9   EOS PCT %  --   --  3    < > = values in this interval not displayed  Results from last 7 days   Lab Units 07/12/22  0524 07/07/22  0507 07/06/22  1648   SODIUM mmol/L 141   < > 146*   POTASSIUM mmol/L 4 1   < > 4 1   CHLORIDE mmol/L 102   < > 107   CO2 mmol/L 29   < > 24   BUN mg/dL 89*   < > 64*   CREATININE mg/dL 3 60*   < > 2 94*   ANION GAP mmol/L 10   < > 15*   CALCIUM mg/dL 8 1*   < > 8 0*   ALBUMIN g/dL  --   --  2 9*   TOTAL BILIRUBIN mg/dL  --   --  0 20   ALK PHOS U/L  --   --  113   ALT U/L  --   --  30   AST U/L  --   --  11   GLUCOSE RANDOM mg/dL 284*   < > 46*    < > = values in this interval not displayed  Results from last 7 days   Lab Units 07/06/22  1648   INR  1 05     Results from last 7 days   Lab Units 07/12/22  0739 07/11/22  2107 07/11/22  1615 07/11/22  1111 07/11/22  0750 07/10/22  2054 07/10/22  1608 07/10/22  1211 07/10/22  0738 07/09/22  2107 07/09/22  1622 07/09/22  1118   POC GLUCOSE mg/dl 262* 420* 369* 306* 259* 372* 273* 301* 271* 296* 219* 253*               Lines/Drains:  Invasive Devices  Report    Peripheral Intravenous Line  Duration           Peripheral IV 07/09/22 Dorsal (posterior); Right Forearm 2 days          Drain  Duration           Suprapubic Catheter Non-latex 16 Fr  278 days                      Imaging: No pertinent imaging reviewed      Recent Cultures (last 7 days):         Last 24 Hours Medication List:   Current Facility-Administered Medications   Medication Dose Route Frequency Provider Last Rate    acetaminophen  650 mg Oral Q6H PRN Sepideh Ribeior PA-C      allopurinol  300 mg Oral Daily Omari Silvestre SETH Mancini      amLODIPine  10 mg Oral Daily Fabian Loera MD      aspirin  81 mg Oral Daily [place], PA-C      atorvastatin  40 mg Oral Daily [place], [place]      bisoprolol  2 5 mg Oral Daily Bere Gonzalez MD      citalopram  40 mg Oral Daily [place]      clopidogrel  75 mg Oral Daily [place]      gabapentin  400 mg Oral Daily SETH De Leon      heparin (porcine)  5,000 Units Subcutaneous [place], [place]      HYDROcodone-acetaminophen  1 tablet Oral TID PRN SETH [place]      insulin glargine  15 Units Subcutaneous Q12H [address] [place], [place]      insulin lispro  2-12 Units Subcutaneous HS SETH [place]      insulin lispro  2-12 Units Subcutaneous TID AC Bere Gonzalez MD      isosorbide mononitrate  60 mg Oral Daily Fabian Loera MD      levothyroxine  50 mcg Oral Early Morning SETH De Leon      lidocaine  1 patch Topical Daily [place], [place]      nitroglycerin  0 4 mg Sublingual Q5 Min PRN SETH D eLeon      nystatin   Topical BID [place], [place]      OLANZapine  5 mg Oral HS [place], PA-C      ondansetron  4 mg Intravenous Q6H PRN SETH [place]      pantoprazole  40 mg Oral BID AC SETH De Leon      potassium chloride  40 mEq Oral Daily [place], PA-C      senna  1 tablet Oral Daily [place], [place]      tiZANidine  4 mg Oral Q8H PRN SETH De Leon      torsemide  40 mg Oral BID (diuretic) Arnaud Inman DO          Today, Patient Was Seen By: Anamika Patel PA-C    **Please Note: This note may have been constructed using a voice recognition system  **

## 2022-07-12 NOTE — ASSESSMENT & PLAN NOTE
Wt Readings from Last 3 Encounters:   07/12/22 124 kg (272 lb 4 3 oz)   07/06/22 136 kg (300 lb)   07/02/22 128 kg (281 lb 12 oz)     With a history of combined systolic and diastolic CHF, most recent echocardiogram revealing ejection fraction of 30%    · Patient recently admitted and underwent cardiac catheterization on 07/01 with PCI to the mid circumflex artery  · Followed up with Cardiology today, noted 20 lb weight gain since discharge 4 days ago  · Has been compliant with medications, maintained on Demadex 40 mg daily as an outpatient  · BNP elevated at 14,930  · Baseline weight approximately 280  · Weight on arrival 298, down to 272 today   · Monitor daily weights, monitor I's and O's  · Patient was placed on a Bumex drip, switched to Demadex 40 mg PO BID  · Worsening kidney function today, appreciate nephrology recommendations

## 2022-07-12 NOTE — ASSESSMENT & PLAN NOTE
Lab Results   Component Value Date    EGFR 13 07/12/2022    EGFR 14 07/11/2022    EGFR 15 07/10/2022    CREATININE 3 60 (H) 07/12/2022    CREATININE 3 25 (H) 07/11/2022    CREATININE 3 12 (H) 07/10/2022     · Now with ALFRED, creatinine worsened to 3 60 today  · Nephrology following  · Gabapentin dose was decreased yesterday  · Hold parameters increased on BP meds to avoid hypotension  · Monitor BMP daily

## 2022-07-12 NOTE — ASSESSMENT & PLAN NOTE
Lab Results   Component Value Date    HGBA1C 7 3 (A) 03/29/2022       Recent Labs     07/11/22  1111 07/11/22  1615 07/11/22  2107 07/12/22  0739   POCGLU 306* 369* 420* 262*       Blood Sugar Average: Last 72 hrs:  (P) 291 [id number]   Patient is maintained on Lantus 50 units b i d  And scheduled Humalog 20 units t i d  With associated sliding scale    · with hypoglycemia during admission, now with hyperglycemia  · Basal insulin re-initiated starting at 10 units Q 12, will increase to 15 units q 12  · Sliding scale insulin a c  HS  · Hypoglycemia protocol

## 2022-07-13 ENCOUNTER — HOME CARE VISIT (OUTPATIENT)
Dept: HOME HEALTH SERVICES | Facility: HOME HEALTHCARE | Age: 62
End: 2022-07-13

## 2022-07-13 LAB
ANION GAP SERPL CALCULATED.3IONS-SCNC: 11 MMOL/L (ref 4–13)
BASOPHILS # BLD AUTO: 0.04 THOUSANDS/ΜL (ref 0–0.1)
BASOPHILS NFR BLD AUTO: 1 % (ref 0–1)
BUN SERPL-MCNC: 93 MG/DL (ref 5–25)
CALCIUM SERPL-MCNC: 8.3 MG/DL (ref 8.3–10.1)
CHLORIDE SERPL-SCNC: 102 MMOL/L (ref 100–108)
CO2 SERPL-SCNC: 28 MMOL/L (ref 21–32)
CREAT SERPL-MCNC: 3.48 MG/DL (ref 0.6–1.3)
EOSINOPHIL # BLD AUTO: 0.37 THOUSAND/ΜL (ref 0–0.61)
EOSINOPHIL NFR BLD AUTO: 6 % (ref 0–6)
ERYTHROCYTE [DISTWIDTH] IN BLOOD BY AUTOMATED COUNT: 15.2 % (ref 11.6–15.1)
GFR SERPL CREATININE-BSD FRML MDRD: 13 ML/MIN/1.73SQ M
GLUCOSE SERPL-MCNC: 258 MG/DL (ref 65–140)
GLUCOSE SERPL-MCNC: 259 MG/DL (ref 65–140)
GLUCOSE SERPL-MCNC: 279 MG/DL (ref 65–140)
GLUCOSE SERPL-MCNC: 285 MG/DL (ref 65–140)
GLUCOSE SERPL-MCNC: 311 MG/DL (ref 65–140)
HCT VFR BLD AUTO: 25.6 % (ref 34.8–46.1)
HGB BLD-MCNC: 8.2 G/DL (ref 11.5–15.4)
IMM GRANULOCYTES # BLD AUTO: 0.02 THOUSAND/UL (ref 0–0.2)
IMM GRANULOCYTES NFR BLD AUTO: 0 % (ref 0–2)
LYMPHOCYTES # BLD AUTO: 1.49 THOUSANDS/ΜL (ref 0.6–4.47)
LYMPHOCYTES NFR BLD AUTO: 23 % (ref 14–44)
MCH RBC QN AUTO: 32.3 PG (ref 26.8–34.3)
MCHC RBC AUTO-ENTMCNC: 32 G/DL (ref 31.4–37.4)
MCV RBC AUTO: 101 FL (ref 82–98)
MONOCYTES # BLD AUTO: 0.45 THOUSAND/ΜL (ref 0.17–1.22)
MONOCYTES NFR BLD AUTO: 7 % (ref 4–12)
NEUTROPHILS # BLD AUTO: 4.12 THOUSANDS/ΜL (ref 1.85–7.62)
NEUTS SEG NFR BLD AUTO: 63 % (ref 43–75)
NRBC BLD AUTO-RTO: 0 /100 WBCS
PLATELET # BLD AUTO: 228 THOUSANDS/UL (ref 149–390)
PMV BLD AUTO: 10.4 FL (ref 8.9–12.7)
POTASSIUM SERPL-SCNC: 3.8 MMOL/L (ref 3.5–5.3)
RBC # BLD AUTO: 2.54 MILLION/UL (ref 3.81–5.12)
SODIUM SERPL-SCNC: 141 MMOL/L (ref 136–145)
WBC # BLD AUTO: 6.49 THOUSAND/UL (ref 4.31–10.16)

## 2022-07-13 PROCEDURE — 99232 SBSQ HOSP IP/OBS MODERATE 35: CPT | Performed by: INTERNAL MEDICINE

## 2022-07-13 PROCEDURE — 99232 SBSQ HOSP IP/OBS MODERATE 35: CPT

## 2022-07-13 PROCEDURE — 85025 COMPLETE CBC W/AUTO DIFF WBC: CPT | Performed by: PHYSICIAN ASSISTANT

## 2022-07-13 PROCEDURE — 82948 REAGENT STRIP/BLOOD GLUCOSE: CPT

## 2022-07-13 PROCEDURE — 80048 BASIC METABOLIC PNL TOTAL CA: CPT | Performed by: PHYSICIAN ASSISTANT

## 2022-07-13 PROCEDURE — 99232 SBSQ HOSP IP/OBS MODERATE 35: CPT | Performed by: PHYSICIAN ASSISTANT

## 2022-07-13 RX ORDER — LANOLIN ALCOHOL/MO/W.PET/CERES
6 CREAM (GRAM) TOPICAL
Status: DISCONTINUED | OUTPATIENT
Start: 2022-07-13 | End: 2022-07-19 | Stop reason: HOSPADM

## 2022-07-13 RX ORDER — TORSEMIDE 20 MG/1
40 TABLET ORAL DAILY
Status: DISCONTINUED | OUTPATIENT
Start: 2022-07-14 | End: 2022-07-15

## 2022-07-13 RX ORDER — INSULIN LISPRO 100 [IU]/ML
3 INJECTION, SOLUTION INTRAVENOUS; SUBCUTANEOUS
Status: DISCONTINUED | OUTPATIENT
Start: 2022-07-13 | End: 2022-07-14

## 2022-07-13 RX ADMIN — CLOPIDOGREL BISULFATE 75 MG: 75 TABLET ORAL at 08:57

## 2022-07-13 RX ADMIN — ALLOPURINOL 300 MG: 100 TABLET ORAL at 08:57

## 2022-07-13 RX ADMIN — ACETAMINOPHEN 650 MG: 325 TABLET, FILM COATED ORAL at 00:45

## 2022-07-13 RX ADMIN — INSULIN LISPRO 3 UNITS: 100 INJECTION, SOLUTION INTRAVENOUS; SUBCUTANEOUS at 12:34

## 2022-07-13 RX ADMIN — HEPARIN SODIUM 5000 UNITS: 5000 INJECTION INTRAVENOUS; SUBCUTANEOUS at 13:35

## 2022-07-13 RX ADMIN — HYDROCODONE BITARTRATE AND ACETAMINOPHEN 1 TABLET: 5; 325 TABLET ORAL at 09:11

## 2022-07-13 RX ADMIN — LIDOCAINE 1 PATCH: 50 PATCH CUTANEOUS at 09:00

## 2022-07-13 RX ADMIN — INSULIN LISPRO 8 UNITS: 100 INJECTION, SOLUTION INTRAVENOUS; SUBCUTANEOUS at 12:34

## 2022-07-13 RX ADMIN — INSULIN LISPRO 3 UNITS: 100 INJECTION, SOLUTION INTRAVENOUS; SUBCUTANEOUS at 16:44

## 2022-07-13 RX ADMIN — HEPARIN SODIUM 5000 UNITS: 5000 INJECTION INTRAVENOUS; SUBCUTANEOUS at 21:58

## 2022-07-13 RX ADMIN — HYDROCODONE BITARTRATE AND ACETAMINOPHEN 1 TABLET: 5; 325 TABLET ORAL at 23:04

## 2022-07-13 RX ADMIN — PANTOPRAZOLE SODIUM 40 MG: 40 TABLET, DELAYED RELEASE ORAL at 16:44

## 2022-07-13 RX ADMIN — CITALOPRAM HYDROBROMIDE 40 MG: 20 TABLET ORAL at 08:57

## 2022-07-13 RX ADMIN — ATORVASTATIN CALCIUM 40 MG: 40 TABLET, FILM COATED ORAL at 08:58

## 2022-07-13 RX ADMIN — AMLODIPINE BESYLATE 10 MG: 10 TABLET ORAL at 08:58

## 2022-07-13 RX ADMIN — OLANZAPINE 5 MG: 5 TABLET, FILM COATED ORAL at 21:58

## 2022-07-13 RX ADMIN — MELATONIN TAB 3 MG 6 MG: 3 TAB at 21:58

## 2022-07-13 RX ADMIN — INSULIN GLARGINE 15 UNITS: 100 INJECTION, SOLUTION SUBCUTANEOUS at 21:58

## 2022-07-13 RX ADMIN — NYSTATIN: 100000 POWDER TOPICAL at 08:57

## 2022-07-13 RX ADMIN — HYDROCODONE BITARTRATE AND ACETAMINOPHEN 1 TABLET: 5; 325 TABLET ORAL at 20:05

## 2022-07-13 RX ADMIN — ASPIRIN 81 MG: 81 TABLET, COATED ORAL at 08:57

## 2022-07-13 RX ADMIN — TIZANIDINE 4 MG: 4 TABLET ORAL at 00:45

## 2022-07-13 RX ADMIN — ISOSORBIDE MONONITRATE 60 MG: 60 TABLET, EXTENDED RELEASE ORAL at 08:58

## 2022-07-13 RX ADMIN — LEVOTHYROXINE SODIUM 50 MCG: 50 TABLET ORAL at 06:14

## 2022-07-13 RX ADMIN — INSULIN LISPRO 6 UNITS: 100 INJECTION, SOLUTION INTRAVENOUS; SUBCUTANEOUS at 16:44

## 2022-07-13 RX ADMIN — INSULIN LISPRO 6 UNITS: 100 INJECTION, SOLUTION INTRAVENOUS; SUBCUTANEOUS at 21:58

## 2022-07-13 RX ADMIN — BISOPROLOL FUMARATE 2.5 MG: 5 TABLET ORAL at 09:09

## 2022-07-13 RX ADMIN — INSULIN LISPRO 6 UNITS: 100 INJECTION, SOLUTION INTRAVENOUS; SUBCUTANEOUS at 08:58

## 2022-07-13 RX ADMIN — HEPARIN SODIUM 5000 UNITS: 5000 INJECTION INTRAVENOUS; SUBCUTANEOUS at 06:14

## 2022-07-13 RX ADMIN — MELATONIN TAB 3 MG 6 MG: 3 TAB at 00:45

## 2022-07-13 RX ADMIN — SENNOSIDES 8.6 MG: 8.6 TABLET, FILM COATED ORAL at 08:58

## 2022-07-13 RX ADMIN — NYSTATIN: 100000 POWDER TOPICAL at 16:44

## 2022-07-13 RX ADMIN — GABAPENTIN 400 MG: 400 CAPSULE ORAL at 08:58

## 2022-07-13 RX ADMIN — PANTOPRAZOLE SODIUM 40 MG: 40 TABLET, DELAYED RELEASE ORAL at 06:14

## 2022-07-13 RX ADMIN — INSULIN GLARGINE 15 UNITS: 100 INJECTION, SOLUTION SUBCUTANEOUS at 08:58

## 2022-07-13 NOTE — ASSESSMENT & PLAN NOTE
Wt Readings from Last 3 Encounters:   07/13/22 122 kg (269 lb 6 4 oz)   07/06/22 136 kg (300 lb)   07/02/22 128 kg (281 lb 12 oz)     With a history of combined systolic and diastolic CHF, most recent echocardiogram revealing ejection fraction of 30%    · Patient recently admitted and underwent cardiac catheterization on 07/01 with PCI to the mid circumflex artery  · Followed up with Cardiology today, noted 20 lb weight gain since discharge 4 days ago  · Has been compliant with medications, maintained on Demadex 40 mg daily as an outpatient  · BNP elevated at 14,930  · Baseline weight approximately 280  · Weight on arrival 298, down to 272 today   · Monitor daily weights, monitor I's and O's  · Patient was placed on a Bumex drip, switched to Demadex 40 mg PO BID  · Worsening kidney function yesterday  · Will likely require higher level creatinine in order to maintain euvolemia  · Cardiology discussing restarting Bumex drip

## 2022-07-13 NOTE — ASSESSMENT & PLAN NOTE
Lab Results   Component Value Date    HGBA1C 7 3 (A) 03/29/2022       Recent Labs     07/12/22  1615 07/12/22  2126 07/13/22  0724 07/13/22  1105   POCGLU 335* 345* 279* 311*       Blood Sugar Average: Last 72 hrs:  (P) 939 [id number]   Patient is maintained on Lantus 50 units b i d  And scheduled Humalog 20 units t i d  With associated sliding scale  · with hypoglycemia during admission, now with hyperglycemia  · Basal insulin re-initiated starting at 10 units Q 12, will increase to 20 units q 12 given continued hyperglycemia  · Scheduled Humalog 3 units t i d   With meals added  · Sliding scale insulin a c  HS  · Hypoglycemia protocol

## 2022-07-13 NOTE — PROGRESS NOTES
Follow up Consultation    Nephrology   Kalpana Jacoby 63 y o  female MRN: [id number]  Unit/Bed#: [place] [address] 2 [address] 87 206-01 Encounter: [id number]      Physician Requesting Consult: Leila Mcmahon Pe*        ASSESSMENT/PLAN:  74-year-old female with multiple comorbidities including uncontrolled diabetes, CKD stage 4, CHF, CAD, chronic suprapubic catheter admitted with worsening shortness of breath  Nephrology following for acute kidney injury  Acute kidney injury on CKD stage 4:  ALFRED multifactorial most likely secondary to cardiorenal syndrome with volume overload plus recent episode of acute kidney injury in June 2022 + some component of CKD progression  After review of records In [facility] as well as Care everywhere it appears that the patient has a baseline Creatinine of 2 2-2 9 mg/dL  patient was admitted with a creatinine of 2 94 mg/dL on 07/06/2022  patient's creatinine today is at 3 48 mg/dL improved from prior  May need to accept her baseline creatinine for euvolemia  Recommend holding torsemide today and restarting from tomorrow at 40 mg p o  Q day  No acute indication for dialysis at this time  check BMP in a m  Await renal recovery  Optimize hemodynamic status to avoid delay in renal recovery  Place on a renal diet when allowed diet order  Avoid nephrotoxins, adjust meds to appropriate GFR  Strict I/O  Daily weights  Urinary retention protocol if patient does not have a Camargo  Most likely has underlying CKD secondary to diabetic kidney disease plus obesity did have a filtration plus prior episode of acute kidney injury non dialysis requiring plus cardiorenal syndrome plus obesity related FSGS  will need to set up patient for follow up with Nephrology as an outpatient post hospitalization    for nephrology as an outpatient patient follows up with Dr Isabelle Camacho  If creatinine and volume status continues to be stable in the next 24-48 hours on the diuretic regimen then patient may be stable for discharge    Blood pressure/hypertension:  current medications:  Norvasc 10 mg p o  Q day, bisoprolol 2 5 mg p o  Q day, Imdur 60 mg p o  Q day  recommendations:  Hold torsemide today and start at 40 mg p o  Q day from 2022  Optimize hemodynamics  Maintain MAP > 65mmHg  Avoid BP fluctuations  H/H/anemia:  most recent hemoglobin at 8 2 grams/deciliter  maintain hemoglobin greater than 8 grams/deciliter    Acid-base electrolytes:    Electrolytes:      Acid-base:    Most recent bicarb at 28 stable    Other medical problems:  Proteinuria: Most recent microalbumin creatinine ratio 3 6 g as of 2022  Diabetes:  Management per primary team   On insulin  Most recent A1c 7 3%  CHF:  Management per primary team   Follow-up with cardiology most recent echo with EF of 38%  Hold Bumex drip and torsemide today restart torsemide 40 mg p o  Q day from 2022  Check daily weights  chronic suprapubic Camargo:  Follow-up with Urology      Thanks for the consult  Will continue to follow  Please call with questions/ concerns  Above-mentioned orders and Plan in terms of acute kidney injury was discussed with the team and Cardiology in depth via tiger text    Geovanna Albert MD, [facility] 2022, 12:44 PM              Objective :   Patient seen and examined in her room no overnight events hemodynamically stable got 1 dose of torsemide yesterday urine output close to 2 7 L last 24 hr continues to lose weight overall shortness of breath much improved was not on any oxygen when I saw her    Happy to hear renal parameters are improving hopeful for discharge soon      PHYSICAL EXAM  /59 (BP Location: Left arm)   Pulse (!) 54   Temp 97 5 °F (36 4 °C) (Oral)   Resp 20   Ht 5' 8" (1 727 m)   Wt 122 kg (269 lb 6 4 oz)   SpO2 93%   BMI 40 96 kg/m²   Temp (24hrs), Av 5 °F (36 4 °C), Min:97 5 °F (36 4 °C), Max:97 6 °F (36 4 °C)        Intake/Output Summary (Last 24 hours) at 2022 1244  Last data filed at 7/13/2022 0552  Gross per 24 hour   Intake 1455 ml   Output 2200 ml   Net -745 ml       I/O last 24 hours: In: 1855 [P O :1855]  Out: 2700 [Urine:2700]      Current Weight: Weight - Scale: 122 kg (269 lb 6 4 oz)  First Weight: Weight - Scale: 135 kg (298 lb 1 oz)  Physical Exam  Vitals and nursing note reviewed  Constitutional:       General: She is not in acute distress  Appearance: Normal appearance  She is obese  She is not ill-appearing, toxic-appearing or diaphoretic  HENT:      Head: Normocephalic and atraumatic  Mouth/Throat:      Mouth: Mucous membranes are moist       Pharynx: Oropharynx is clear  No oropharyngeal exudate  Eyes:      General: No scleral icterus  Conjunctiva/sclera: Conjunctivae normal    Cardiovascular:      Rate and Rhythm: Normal rate  Heart sounds: Normal heart sounds  No friction rub  Pulmonary:      Effort: Pulmonary effort is normal  No respiratory distress  Breath sounds: No wheezing  Comments: Minimal basal crackles  Abdominal:      General: There is no distension  Palpations: Abdomen is soft  Tenderness: There is no abdominal tenderness  Comments: Suprapubic catheter   Musculoskeletal:         General: Swelling present  Cervical back: Normal range of motion and neck supple  No rigidity  Comments: Trace edema bilateral lower extremities   Skin:     General: Skin is warm  Coloration: Skin is not jaundiced  Neurological:      General: No focal deficit present  Mental Status: She is alert and oriented to person, place, and time  Psychiatric:         Mood and Affect: Mood normal          Behavior: Behavior normal              Review of Systems   Constitutional: Negative for appetite change, chills and fatigue  HENT: Negative for congestion  Respiratory: Negative for cough, shortness of breath and wheezing  Cardiovascular: Positive for leg swelling     Gastrointestinal: Negative for abdominal pain, constipation, diarrhea, nausea and vomiting  Genitourinary: Negative for hematuria  Musculoskeletal: Negative for back pain  Skin: Negative for rash  Neurological: Negative for headaches  Psychiatric/Behavioral: Negative for agitation and confusion  All other systems reviewed and are negative        Scheduled Meds:  Current Facility-Administered Medications   Medication Dose Route Frequency Provider Last Rate    acetaminophen  650 mg Oral Q6H PRN Heena Rodriguez PA-C      allopurinol  300 mg Oral Daily Heena Rodriguez PA-C      amLODIPine  10 mg Oral Daily Yoko Harris MD      aspirin  81 mg Oral Daily Heena Rodriguez PA-C      atorvastatin  40 mg Oral Daily [place]      bisoprolol  2 5 mg Oral Daily Bere Bass MD      citalopram  40 mg Oral Daily [place], [place]      clopidogrel  75 mg Oral Daily Heena [place], [place]      gabapentin  400 mg Oral Daily Heena Rodriguez PA-C      heparin (porcine)  5,000 Units Subcutaneous [place], [place]      HYDROcodone-acetaminophen  1 tablet Oral TID PRN Heena Rodriguez PA-C      insulin glargine  15 Units Subcutaneous Q12H [facility], [place]      insulin lispro  2-12 Units Subcutaneous HS Heena Rodriguez PA-C      insulin lispro  2-12 Units Subcutaneous TID  Bere Bass MD      insulin lispro  3 Units Subcutaneous TID With Meals Heena Rodriguez PA-C      isosorbide mononitrate  60 mg Oral Daily Yoko Harris MD      levothyroxine  50 mcg Oral Early Morning Heena Rodriguez PA-C      lidocaine  1 patch Topical Daily [place], [place]      melatonin  6 mg Oral HS Debi Rodriguez PA-C      nitroglycerin  0 4 mg Sublingual Q5 Min PRN Heenanolan Rodriguez PA-C      nystatin   Topical BID [place], [place]      OLANZapine  5 mg Oral HS Heena Rodriguez PA-C      ondansetron  4 mg Intravenous Q6H PRN Heena Rodriguez PA-C      pantoprazole  40 mg Oral BID  Heena Rodriguez PA-C      senna  1 tablet Oral Daily [place], [place]      tiZANidine  4 mg Oral Q8H PRN Lux Brown PA-C      [START ON 7/14/2022] torsemide  40 mg Oral Daily Greer Weaver MD         PRN Meds:   acetaminophen    HYDROcodone-acetaminophen    nitroglycerin    ondansetron    tiZANidine    Continuous Infusions:       Invasive Devices: Invasive Devices  Report    Peripheral Intravenous Line  Duration           Peripheral IV 07/13/22 Left Forearm <1 day          Drain  Duration           Suprapubic Catheter Non-latex 16 Fr  279 days                  LABORATORY:    Results from last 7 days   Lab Units 07/13/22  0517 07/12/22  0524 07/11/22  0557 07/10/22  0449 07/09/22  0506 07/08/22  0536 07/07/22  0507 07/06/22  1648   WBC Thousand/uL 6 49  --  6 04 5 00  --  6 96 7 43 10 99*   HEMOGLOBIN g/dL 8 2*  --  8 3* 9 0*  --  7 9* 7 6* 8 5*   HEMATOCRIT % 25 6*  --  26 6* 28 7*  --  25 5* 24 6* 27 1*   PLATELETS Thousands/uL 228  --  259 269  --  283 298 351   POTASSIUM mmol/L 3 8 4 1 4 2 4 1 4 2 4 3 4 0 4 1   CHLORIDE mmol/L 102 102 103 102 104 106 107 107   CO2 mmol/L 28 29 28 30 28 26 21 24   BUN mg/dL 93* 89* 86* 76* 73* 66* 61* 64*   CREATININE mg/dL 3 48* 3 60* 3 25* 3 12* 2 97* 2 98* 2 65* 2 94*   CALCIUM mg/dL 8 3 8 1* 8 5 8 4 7 9* 7 6* 7 6* 8 0*   MAGNESIUM mg/dL  --   --   --   --   --  1 8  --   --       rest all reviewed    RADIOLOGY:  XR chest 1 view portable   Final Result by Domingo Mello MD (07/07 5528)      Unchanged enlargement of the cardiomediastinal silhouette  Possible small bilateral pleural effusions  Decreased right basilar opacity  Workstation performed: [id number]           Rest all reviewed    Portions of the record may have been created with voice recognition software  Occasional wrong word or "sound a like" substitutions may have occurred due to the inherent limitations of voice recognition software  Read the chart carefully and recognize, using context, where substitutions have occurred  If you have any questions, please contact the dictating provider

## 2022-07-13 NOTE — ASSESSMENT & PLAN NOTE
Lab Results   Component Value Date    EGFR 13 07/13/2022    EGFR 13 07/12/2022    EGFR 14 07/11/2022    CREATININE 3 48 (H) 07/13/2022    CREATININE 3 60 (H) 07/12/2022    CREATININE 3 25 (H) 07/11/2022     Now with ALFRED, creatinine worsened to 3 60 yesterday  · Nephrology following  · Gabapentin dose was decreased   · Hold parameters increased on BP meds to avoid hypotension  · Monitor BMP daily  · Will likely require higher creatinine in order to maintain euvolemia

## 2022-07-13 NOTE — PROGRESS NOTES
Progress Note - Cardiology   Hazel Jaimeome 63 y o  female MRN: [id number]  Unit/Bed#: [place] [address] -01 Encounter: [id number]      Assessment/Recommendations/Discussion:   1  Acute on chronic combined systolic and diastolic heart failure   ? LVEF 38%, moderate LVH, mild LA dilated, AV sclerosis, trace AR, mild MR, MV sclerosis, mild TR, June 2022   2  CAD with multiple PCIs   ? NSTEMI s/p JANET-mLCx w/ small 95% pRCA and diffuse moderate disease of LAD, July 2022   ? s/p -pRCA, August 2021   ? s/p JANET-mLAD, JANET-D1 and JANET-LCx, December 2012   3  Recent Acute renal failure on chronic kidney disease   4  Recent acute enteritis   5  Ischemic cardiomyopathy with moderate to markedly reduced LV systolic function   6  Dyslipidemia   7  Morbid obesity   8  CKD stage 4   9  Anemia of chronic kidney disease   10  Type 2 diabetes mellitus   11  PCOS   12  Fibromyalgia   13  Neurogenic bladder with suprapubic catheter in place   14  Obstructive sleep apnea, uncorrected   15  Syncope with orthostatic hypotension   16  Mild pulmonary hypertension       PLAN   Still with bilateral lower extremity edema   Creatinine still around 3 5   Diuretics were held yesterday   She is currently off Bumex drip   Await follow-up with Nephrology, I think we will definitely need to accept higher level of creatinine in order to keep her euvolemic  May need to restart Bumex drip   No angina symptoms at this time   EF is 38%    Subjective:   HPI  Less short of breath, still with bilateral lower extremity edema, no chest pain or pressure      Review of Systems: As noted in HPI  Rest of ROS is negative      Vitals:   /59   Pulse (!) 54   Temp 97 5 °F (36 4 °C)   Resp 20   Ht 5' 8" (1 727 m)   Wt 122 kg (269 lb 6 4 oz)   SpO2 93%   BMI 40 96 kg/m²   Vitals:    07/12/22 0530 07/13/22 0600   Weight: 124 kg (272 lb 4 3 oz) 122 kg (269 lb 6 4 oz)       Intake/Output Summary (Last 24 hours) at 7/13/2022 8830  Last data filed at 7/13/2022 0552  Gross per 24 hour   Intake 1855 ml   Output 2700 ml   Net -845 ml       Physical Exam   Constitutional: awake, alert and oriented, in no acute distress, no obvious deformities, obese female  Head: Normocephalic, without obvious abnormality, atraumatic  Eyes: conjunctivae clear and moist  Sclera anicteric  No xanthelasmas  Pupils equal bilaterally  Extraocular motions are full  Ear nose mouth and throat: ears are symmetrical bilaterally, hearing appears to be equal bilaterally, no nasal discharge or epistaxis, oropharynx is clear with moist mucous membranes  Neck:  Trachea is midline, neck is supple, no thyromegaly or significant lymphadenopathy, there is full range of motion  Lungs: clear to auscultation bilaterally, no wheezes, no rales, no rhonchi, no accessory muscle use, breathing is nonlabored  Heart: regular rate and rhythm, S1, S2 normal, no murmur, no click, no rub and no gallop, 2-3+ lower extremity edema  Abdomen:  Obese, soft, non-tender; bowel sounds normal; no masses,  no organomegaly  Psychiatric:  Patient is oriented to time, place, person, mood/affect is negative for depression, anxiety, agitation, appears to have appropriate insight  Skin: Skin is warm, dry, intact  No obvious rashes or lesions on exposed extremities  Nail beds are pink with no cyanosis or clubbing  TELEMETRY:   Lab Results:  Results from last 7 days   Lab Units 07/13/22  0517   WBC Thousand/uL 6 49   HEMOGLOBIN g/dL 8 2*   HEMATOCRIT % 25 6*   PLATELETS Thousands/uL 228     Results from last 7 days   Lab Units 07/13/22  0517 07/07/22  0507 07/06/22  1648   POTASSIUM mmol/L 3 8   < > 4 1   CHLORIDE mmol/L 102   < > 107   CO2 mmol/L 28   < > 24   BUN mg/dL 93*   < > 64*   CREATININE mg/dL 3 48*   < > 2 94*   CALCIUM mg/dL 8 3   < > 8 0*   ALK PHOS U/L  --   --  113   ALT U/L  --   --  30   AST U/L  --   --  11    < > = values in this interval not displayed       Results from last 7 days   Lab Units 07/13/22  0517   POTASSIUM mmol/L 3 8   CHLORIDE mmol/L 102   CO2 mmol/L 28   BUN mg/dL 93*   CREATININE mg/dL 3 48*   CALCIUM mg/dL 8 3           Medications:    Current Facility-Administered Medications:     acetaminophen (TYLENOL) tablet 650 mg, 650 mg, Oral, Q6H PRN, Sarah Jones PA-C, 650 mg at 07/13/22 0045    allopurinol (ZYLOPRIM) tablet 300 mg, 300 mg, Oral, Daily, Sarah Jones PA-C, 300 mg at 07/13/22 0857    amLODIPine (NORVASC) tablet 10 mg, 10 mg, Oral, Daily, Claudio Schmidt MD, 10 mg at 07/13/22 0858    aspirin (ECOTRIN LOW STRENGTH) EC tablet 81 mg, 81 mg, Oral, Daily, Sarah Jones PA-C, 81 mg at 07/13/22 0857    atorvastatin (LIPITOR) tablet 40 mg, 40 mg, Oral, Daily, Sarah Jones PA-C, 40 mg at 07/13/22 0858    bisoprolol (ZEBETA) tablet 2 5 mg, 2 5 mg, Oral, Daily, Bere Mejia MD, 2 5 mg at 07/13/22 0909    citalopram (CeleXA) tablet 40 mg, 40 mg, Oral, Daily, Sarah Jones PA-C, 40 mg at 07/13/22 0857    clopidogrel (PLAVIX) tablet 75 mg, 75 mg, Oral, Daily, Sarah Jones PA-C, 75 mg at 07/13/22 0857    gabapentin (NEURONTIN) capsule 400 mg, 400 mg, Oral, Daily, Sarah Jones PA-C, 400 mg at 07/13/22 0858    heparin (porcine) subcutaneous injection 5,000 Units, 5,000 Units, Subcutaneous, Q8H [address], 5,000 Units at 07/13/22 0614 **AND** [CANCELED] Platelet count, , , Once, Saarh Jones PA-C    HYDROcodone-acetaminophen [facility]) 5-325 mg per tablet 1 tablet, 1 tablet, Oral, TID PRN, Sarah Jones PA-C, 1 tablet at 07/13/22 0911    insulin glargine (LANTUS) subcutaneous injection 15 Units 0 15 mL, 15 Units, Subcutaneous, Q12H [address], Rhett Washington PA-C, 15 Units at 07/13/22 0858    insulin lispro (HumaLOG) 100 units/mL subcutaneous injection 2-12 Units, 2-12 Units, Subcutaneous, HS, Sarah Jones PA-C, 8 Units at 07/12/22 2130    insulin lispro (HumaLOG) 100 units/mL subcutaneous injection 2-12 Units, 2-12 Units, Subcutaneous, TID AC, 6 Units at 07/13/22 0858 **AND** Fingerstick Glucose (POCT), , , 4x Daily AC and at bedtime, Bere Mejia MD    isosorbide mononitrate (IMDUR) 24 hr tablet 60 mg, 60 mg, Oral, Daily, Claudio Schmidt MD, 60 mg at 07/13/22 0858    levothyroxine tablet 50 mcg, 50 mcg, Oral, Early Morning, Izzy Serve, PA-C, 50 mcg at 07/13/22 7364    lidocaine (LIDODERM) 5 % patch 1 patch, 1 patch, Topical, Daily, Izzy Serve, PA-C, 1 patch at 07/13/22 0900    melatonin tablet 6 mg, 6 mg, Oral, HS, Debi House, PA-C, 6 mg at 07/13/22 0045    nitroglycerin (NITROSTAT) SL tablet 0 4 mg, 0 4 mg, Sublingual, Q5 Min PRN, Izzy Serve, PA-C, 0 4 mg at 07/09/22 1930    nystatin (MYCOSTATIN) powder, , Topical, BID, Izzy Serve, PA-C, Given at 07/13/22 0857    OLANZapine (ZyPREXA) tablet 5 mg, 5 mg, Oral, HS, Izzy Serve, PA-C, 5 mg at 07/12/22 2121    ondansetron (ZOFRAN) injection 4 mg, 4 mg, Intravenous, Q6H PRN, Izzy Serve, PA-C    pantoprazole (PROTONIX) EC tablet 40 mg, 40 mg, Oral, BID AC, Izzy Serve, PA-C, 40 mg at 07/13/22 0184    senna (SENOKOT) tablet 8 6 mg, 1 tablet, Oral, Daily, Izzy Serve, PA-C, 8 6 mg at 07/13/22 0858    tiZANidine (ZANAFLEX) tablet 4 mg, 4 mg, Oral, Q8H PRN, Izzy Serve, PA-C, 4 mg at 07/13/22 0045    This note was completed in part utilizing [employer] Direct Software  Grammatical errors, random word insertions, spelling mistakes, and incomplete sentences may be an occasional consequence of this system secondary to software limitations, ambient noise, and hardware issues  If you have any questions or concerns about the content, text, or information contained within the body of this dictation, please contact the provider for clarification      Levon Malave DO, [facility]  7/13/2022 9:31 AM

## 2022-07-13 NOTE — CASE MANAGEMENT
Case Management Progress Note    Patient name Cooper Lion  [facility]y Ler[facility]* MRN [id number]  : 1960 Date 2022       LOS (days): 7  Geometric Mean LOS (GMLOS) (days): 3 80  Days to GMLOS:-3 2        OBJECTIVE:        Current admission status: Inpatient  Preferred Pharmacy:   [address] [place] [address], [facility]  [address] [place] [id number]  Phone: [phone] Fax: [phone]    Primary Care Provider: CHERELLE Zee    Primary Insurance: [facility] REP  Secondary Insurance: [address] [facility]    PROGRESS NOTE:  Pt states she has an RN (unknown agency) coming 1x/month to check suprapubic cath  Pt is in contact with agency and will inform when she is d/c    Daughter is her caregiver and works for [employer] [place]

## 2022-07-13 NOTE — PROGRESS NOTES
[facility]  Progress Note - Halley Urbina 1960, 63 y o  female MRN: [id number]  Unit/Bed#: [place] [address] [id number] Encounter: [id number]  Primary Care Provider: CHERELLE Ortega   Date and time admitted to hospital: 7/6/2022  4:59 PM    * 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 heart failure (HCC)  Assessment & Plan  Wt Readings from Last 3 Encounters:   07/13/22 122 kg (269 lb 6 4 oz)   07/06/22 136 kg (300 lb)   07/02/22 128 kg (281 lb 12 oz)     With a history of combined systolic and diastolic CHF, most recent echocardiogram revealing ejection fraction of 30%    · Patient recently admitted and underwent cardiac catheterization on 07/01 with PCI to the mid circumflex artery  · Followed up with Cardiology today, noted 20 lb weight gain since discharge 4 days ago  · Has been compliant with medications, maintained on Demadex 40 mg daily as an outpatient  · BNP elevated at 14,930  · Baseline weight approximately 280  · Weight on arrival 298, down to 272 today   · Monitor daily weights, monitor I's and O's  · Patient was placed on a Bumex drip, switched to Demadex 40 mg PO BID  · Worsening kidney function yesterday  · Will likely require higher level creatinine in order to maintain euvolemia  · Cardiology discussing restarting Bumex drip      NSVT (nonsustained ventricular tachycardia) ([facility] [address] )  Assessment & Plan  Patient was noted for an 8 beat V tach on telemetry, asymptomatic  · Beta blocker was on hold with prior episode of bradycardia, will resume  · Magnesium normal  · Telemetry monitoring has been discontinued    Mixed hyperlipidemia  Assessment & Plan  · Continue Lipitor daily     Acquired hypothyroidism  Assessment & Plan  · Continue Synthroid 50 mcg daily    Stage 4 chronic kidney disease [facility])  Assessment & Plan  Lab Results   Component Value Date    EGFR 13 07/13/2022    EGFR 13 07/12/2022    EGFR 14 07/11/2022    CREATININE 3 48 (H) 07/13/2022 CREATININE 3 60 (H) 07/12/2022    CREATININE 3 25 (H) 07/11/2022     Now with ALFRED, creatinine worsened to 3 60 yesterday  · Nephrology following  · Gabapentin dose was decreased   · Hold parameters increased on BP meds to avoid hypotension  · Monitor BMP daily  · Will likely require higher creatinine in order to maintain euvolemia    Anemia  Assessment & Plan  · With history of anemia of chronic disease  · Hemoglobin stable at baseline    CAD (coronary artery disease)  Assessment & Plan  Status post stenting in 2012, then revised in 2017  · Known chronic total occlusion of RCA  · Most recent cardiac catheterization on 07/01 with PCI to mid circumflex  · Continue is Plavix, Lipitor, aspirin, Imdur, bisoprolol    Type 2 diabetes mellitus with stage 4 chronic kidney disease, with long-term current use of insulin [facility])  Assessment & Plan  Lab Results   Component Value Date    HGBA1C 7 3 (A) 03/29/2022       Recent Labs     07/12/22  1615 07/12/22  2126 07/13/22  0724 07/13/22  1105   POCGLU 335* 345* 279* 311*       Blood Sugar Average: Last 72 hrs:  (P) 317 [id number]   Patient is maintained on Lantus 50 units b i d  And scheduled Humalog 20 units t i d  With associated sliding scale  · with hypoglycemia during admission, now with hyperglycemia  · Basal insulin re-initiated starting at 10 units Q 12, will increase to 20 units q 12 given continued hyperglycemia  · Scheduled Humalog 3 units t i d  With meals added  · Sliding scale insulin a c  HS  · Hypoglycemia protocol      VTE Pharmacologic Prophylaxis:   Moderate Risk (Score 3-4) - Pharmacological DVT Prophylaxis Ordered: heparin  Patient Centered Rounds: I performed bedside rounds with nursing staff today  Discussions with Specialists or Other Care Team Provider:  None    Education and Discussions with Family / Patient: Patient declined call to   Time Spent for Care: 15 minutes   More than 50% of total time spent on counseling and coordination of care as described above  Current Length of Stay: 7 day(s)  Current Patient Status: Inpatient   Certification Statement: The patient will continue to require additional inpatient hospital stay due to ALFRED  Discharge Plan: Anticipate discharge in 24-48 hrs to discharge location to be determined pending rehab evaluations  Code Status: Level 1 - Full Code    Subjective:   Patient is seen resting comfortably in bed  She denies any chest pain, shortness a breath, nausea, vomiting, diarrhea, constipation  States her legs are still mildly swollen however improved from initial presentation  Objective:     Vitals:   Temp (24hrs), Av 5 °F (36 4 °C), Min:97 5 °F (36 4 °C), Max:97 6 °F (36 4 °C)    Temp:  [97 5 °F (36 4 °C)-97 6 °F (36 4 °C)] 97 5 °F (36 4 °C)  HR:  [54-65] 54  Resp:  [15-20] 20  BP: (129-150)/(58-70) 137/59  SpO2:  [93 %-94 %] 93 %  Body mass index is 40 96 kg/m²  Input and Output Summary (last 24 hours): Intake/Output Summary (Last 24 hours) at 2022 1231  Last data filed at 2022 0552  Gross per 24 hour   Intake 1455 ml   Output 2200 ml   Net -745 ml       Physical Exam:   Physical Exam  Vitals and nursing note reviewed  Constitutional:       General: She is not in acute distress  Appearance: Normal appearance  She is obese  She is not ill-appearing, toxic-appearing or diaphoretic  Eyes:      General: No scleral icterus  Conjunctiva/sclera: Conjunctivae normal    Cardiovascular:      Rate and Rhythm: Normal rate and regular rhythm  Heart sounds: No murmur heard  No friction rub  No gallop  Pulmonary:      Effort: Pulmonary effort is normal  No respiratory distress  Breath sounds: Normal breath sounds  No stridor  No wheezing, rhonchi or rales  Chest:      Chest wall: No tenderness  Abdominal:      General: Abdomen is flat  Bowel sounds are normal  There is no distension  Palpations: Abdomen is soft  Tenderness:  There is no abdominal tenderness  Musculoskeletal:      Right lower leg: Edema present  Left lower leg: Edema present  Comments: Mild lower extremity edema, 1+ pitting   Skin:     General: Skin is warm and dry  Coloration: Skin is not jaundiced or pale  Neurological:      General: No focal deficit present  Mental Status: She is alert and oriented to person, place, and time  Mental status is at baseline  Additional Data:     Labs:  Results from last 7 days   Lab Units 07/13/22  0517   WBC Thousand/uL 6 49   HEMOGLOBIN g/dL 8 2*   HEMATOCRIT % 25 6*   PLATELETS Thousands/uL 228   NEUTROS PCT % 63   LYMPHS PCT % 23   MONOS PCT % 7   EOS PCT % 6     Results from last 7 days   Lab Units 07/13/22  0517 07/07/22  0507 07/06/22  1648   SODIUM mmol/L 141   < > 146*   POTASSIUM mmol/L 3 8   < > 4 1   CHLORIDE mmol/L 102   < > 107   CO2 mmol/L 28   < > 24   BUN mg/dL 93*   < > 64*   CREATININE mg/dL 3 48*   < > 2 94*   ANION GAP mmol/L 11   < > 15*   CALCIUM mg/dL 8 3   < > 8 0*   ALBUMIN g/dL  --   --  2 9*   TOTAL BILIRUBIN mg/dL  --   --  0 20   ALK PHOS U/L  --   --  113   ALT U/L  --   --  30   AST U/L  --   --  11   GLUCOSE RANDOM mg/dL 259*   < > 46*    < > = values in this interval not displayed  Results from last 7 days   Lab Units 07/06/22  1648   INR  1 05     Results from last 7 days   Lab Units 07/13/22  1105 07/13/22  0724 07/12/22  2126 07/12/22  1615 07/12/22  1116 07/12/22  0739 07/11/22  2107 07/11/22  1615 07/11/22  1111 07/11/22  0750 07/10/22  2054 07/10/22  1608   POC GLUCOSE mg/dl 311* 279* 345* 335* 346* 262* 420* 369* 306* 259* 372* 273*               Lines/Drains:  Invasive Devices  Report    Peripheral Intravenous Line  Duration           Peripheral IV 07/13/22 Left Forearm <1 day          Drain  Duration           Suprapubic Catheter Non-latex 16 Fr  279 days                      Imaging: No pertinent imaging reviewed      Recent Cultures (last 7 days):         Last 24 Hours Medication List:   Current Facility-Administered Medications   Medication Dose Route Frequency Provider Last Rate    acetaminophen  650 mg Oral Q6H PRN Margreta Query, PAALE      allopurinol  300 mg Oral Daily Margreta Query, SETH      amLODIPine  10 mg Oral Daily Stephen Ramesh MD      aspirin  81 mg Oral Daily Margreta Query, PAALE      atorvastatin  40 mg Oral Daily [facility], [place]      bisoprolol  2 5 mg Oral Daily Bere Bagley MD      citalopram  40 mg Oral Daily Margreta Query, [place]      clopidogrel  75 mg Oral Daily Margreta Query, [place]      gabapentin  400 mg Oral Daily Margreta Query, PAALE      heparin (porcine)  5,000 Units Subcutaneous [facility] Margreta Query, [place]      HYDROcodone-acetaminophen  1 tablet Oral TID PRN Margreta Query, SETH      insulin glargine  15 Units Subcutaneous Q12H [facility], [place]      insulin lispro  2-12 Units Subcutaneous HS Margreta Query, SETH      insulin lispro  2-12 Units Subcutaneous TID AC Bere Bagley MD      insulin lispro  3 Units Subcutaneous TID With Meals Margreta Query, SETH      isosorbide mononitrate  60 mg Oral Daily Stephen Ramesh MD      levothyroxine  50 mcg Oral Early Morning Margreta Query, SETH      lidocaine  1 patch Topical Daily [facility], [place]      melatonin  6 mg Oral HS Debi Ortiz, SETH      nitroglycerin  0 4 mg Sublingual Q5 Min PRN Margreta Query, PAALE      nystatin   Topical BID [facility], [place]      OLANZapine  5 mg Oral HS Margreta Query, SETH      ondansetron  4 mg Intravenous Q6H PRN Margreta Query, SETH      pantoprazole  40 mg Oral BID AC Margreta Query, SETH      senna  1 tablet Oral Daily [facility], [place]      tiZANidine  4 mg Oral Q8H PRN Margreta Query, SETH          Today, Patient Was Seen By: Adriana Jacobo PA-C    **Please Note: This note may have been constructed using a voice recognition system  **

## 2022-07-14 LAB
ANION GAP SERPL CALCULATED.3IONS-SCNC: 10 MMOL/L (ref 4–13)
BUN SERPL-MCNC: 90 MG/DL (ref 5–25)
CALCIUM SERPL-MCNC: 8.5 MG/DL (ref 8.3–10.1)
CHLORIDE SERPL-SCNC: 103 MMOL/L (ref 100–108)
CO2 SERPL-SCNC: 29 MMOL/L (ref 21–32)
CREAT SERPL-MCNC: 3.52 MG/DL (ref 0.6–1.3)
ERYTHROCYTE [DISTWIDTH] IN BLOOD BY AUTOMATED COUNT: 15 % (ref 11.6–15.1)
GFR SERPL CREATININE-BSD FRML MDRD: 13 ML/MIN/1.73SQ M
GLUCOSE SERPL-MCNC: 218 MG/DL (ref 65–140)
GLUCOSE SERPL-MCNC: 231 MG/DL (ref 65–140)
GLUCOSE SERPL-MCNC: 265 MG/DL (ref 65–140)
GLUCOSE SERPL-MCNC: 303 MG/DL (ref 65–140)
GLUCOSE SERPL-MCNC: 335 MG/DL (ref 65–140)
GLUCOSE SERPL-MCNC: 360 MG/DL (ref 65–140)
HCT VFR BLD AUTO: 28 % (ref 34.8–46.1)
HGB BLD-MCNC: 8.9 G/DL (ref 11.5–15.4)
MCH RBC QN AUTO: 32.2 PG (ref 26.8–34.3)
MCHC RBC AUTO-ENTMCNC: 31.8 G/DL (ref 31.4–37.4)
MCV RBC AUTO: 101 FL (ref 82–98)
PLATELET # BLD AUTO: 236 THOUSANDS/UL (ref 149–390)
PMV BLD AUTO: 10.1 FL (ref 8.9–12.7)
POTASSIUM SERPL-SCNC: 3.9 MMOL/L (ref 3.5–5.3)
RBC # BLD AUTO: 2.76 MILLION/UL (ref 3.81–5.12)
SODIUM SERPL-SCNC: 142 MMOL/L (ref 136–145)
WBC # BLD AUTO: 6.47 THOUSAND/UL (ref 4.31–10.16)

## 2022-07-14 PROCEDURE — 99232 SBSQ HOSP IP/OBS MODERATE 35: CPT

## 2022-07-14 PROCEDURE — 80048 BASIC METABOLIC PNL TOTAL CA: CPT | Performed by: PHYSICIAN ASSISTANT

## 2022-07-14 PROCEDURE — 85027 COMPLETE CBC AUTOMATED: CPT | Performed by: PHYSICIAN ASSISTANT

## 2022-07-14 PROCEDURE — 97535 SELF CARE MNGMENT TRAINING: CPT | Performed by: PHYSICAL THERAPIST

## 2022-07-14 PROCEDURE — 97530 THERAPEUTIC ACTIVITIES: CPT | Performed by: PHYSICAL THERAPIST

## 2022-07-14 PROCEDURE — 97163 PT EVAL HIGH COMPLEX 45 MIN: CPT | Performed by: PHYSICAL THERAPIST

## 2022-07-14 PROCEDURE — 82948 REAGENT STRIP/BLOOD GLUCOSE: CPT

## 2022-07-14 PROCEDURE — 99232 SBSQ HOSP IP/OBS MODERATE 35: CPT | Performed by: PHYSICIAN ASSISTANT

## 2022-07-14 PROCEDURE — 99232 SBSQ HOSP IP/OBS MODERATE 35: CPT | Performed by: INTERNAL MEDICINE

## 2022-07-14 RX ORDER — INSULIN GLARGINE 100 [IU]/ML
20 INJECTION, SOLUTION SUBCUTANEOUS EVERY 12 HOURS SCHEDULED
Status: DISCONTINUED | OUTPATIENT
Start: 2022-07-14 | End: 2022-07-16

## 2022-07-14 RX ORDER — INSULIN LISPRO 100 [IU]/ML
5 INJECTION, SOLUTION INTRAVENOUS; SUBCUTANEOUS
Status: DISCONTINUED | OUTPATIENT
Start: 2022-07-14 | End: 2022-07-19 | Stop reason: HOSPADM

## 2022-07-14 RX ADMIN — HEPARIN SODIUM 5000 UNITS: 5000 INJECTION INTRAVENOUS; SUBCUTANEOUS at 05:42

## 2022-07-14 RX ADMIN — ALLOPURINOL 300 MG: 100 TABLET ORAL at 08:13

## 2022-07-14 RX ADMIN — LIDOCAINE 1 PATCH: 50 PATCH CUTANEOUS at 08:13

## 2022-07-14 RX ADMIN — HYDROCODONE BITARTRATE AND ACETAMINOPHEN 1 TABLET: 5; 325 TABLET ORAL at 12:35

## 2022-07-14 RX ADMIN — INSULIN LISPRO 4 UNITS: 100 INJECTION, SOLUTION INTRAVENOUS; SUBCUTANEOUS at 08:14

## 2022-07-14 RX ADMIN — AMLODIPINE BESYLATE 10 MG: 10 TABLET ORAL at 08:13

## 2022-07-14 RX ADMIN — ASPIRIN 81 MG: 81 TABLET, COATED ORAL at 08:13

## 2022-07-14 RX ADMIN — ISOSORBIDE MONONITRATE 60 MG: 60 TABLET, EXTENDED RELEASE ORAL at 08:14

## 2022-07-14 RX ADMIN — INSULIN LISPRO 5 UNITS: 100 INJECTION, SOLUTION INTRAVENOUS; SUBCUTANEOUS at 16:53

## 2022-07-14 RX ADMIN — HEPARIN SODIUM 5000 UNITS: 5000 INJECTION INTRAVENOUS; SUBCUTANEOUS at 15:14

## 2022-07-14 RX ADMIN — HYDROCODONE BITARTRATE AND ACETAMINOPHEN 1 TABLET: 5; 325 TABLET ORAL at 23:59

## 2022-07-14 RX ADMIN — LEVOTHYROXINE SODIUM 50 MCG: 50 TABLET ORAL at 05:43

## 2022-07-14 RX ADMIN — CLOPIDOGREL BISULFATE 75 MG: 75 TABLET ORAL at 08:13

## 2022-07-14 RX ADMIN — TORSEMIDE 40 MG: 20 TABLET ORAL at 08:12

## 2022-07-14 RX ADMIN — INSULIN LISPRO 5 UNITS: 100 INJECTION, SOLUTION INTRAVENOUS; SUBCUTANEOUS at 12:28

## 2022-07-14 RX ADMIN — OLANZAPINE 5 MG: 5 TABLET, FILM COATED ORAL at 23:59

## 2022-07-14 RX ADMIN — INSULIN LISPRO 8 UNITS: 100 INJECTION, SOLUTION INTRAVENOUS; SUBCUTANEOUS at 16:53

## 2022-07-14 RX ADMIN — ATORVASTATIN CALCIUM 40 MG: 40 TABLET, FILM COATED ORAL at 08:12

## 2022-07-14 RX ADMIN — CITALOPRAM HYDROBROMIDE 40 MG: 20 TABLET ORAL at 08:13

## 2022-07-14 RX ADMIN — NYSTATIN: 100000 POWDER TOPICAL at 16:51

## 2022-07-14 RX ADMIN — HYDROCODONE BITARTRATE AND ACETAMINOPHEN 1 TABLET: 5; 325 TABLET ORAL at 17:06

## 2022-07-14 RX ADMIN — HEPARIN SODIUM 5000 UNITS: 5000 INJECTION INTRAVENOUS; SUBCUTANEOUS at 23:59

## 2022-07-14 RX ADMIN — INSULIN GLARGINE 15 UNITS: 100 INJECTION, SOLUTION SUBCUTANEOUS at 08:12

## 2022-07-14 RX ADMIN — GABAPENTIN 400 MG: 400 CAPSULE ORAL at 08:13

## 2022-07-14 RX ADMIN — BISOPROLOL FUMARATE 2.5 MG: 5 TABLET ORAL at 08:13

## 2022-07-14 RX ADMIN — SENNOSIDES 8.6 MG: 8.6 TABLET, FILM COATED ORAL at 08:12

## 2022-07-14 RX ADMIN — INSULIN LISPRO 10 UNITS: 100 INJECTION, SOLUTION INTRAVENOUS; SUBCUTANEOUS at 12:27

## 2022-07-14 RX ADMIN — INSULIN LISPRO 3 UNITS: 100 INJECTION, SOLUTION INTRAVENOUS; SUBCUTANEOUS at 08:14

## 2022-07-14 RX ADMIN — NYSTATIN: 100000 POWDER TOPICAL at 08:14

## 2022-07-14 RX ADMIN — PANTOPRAZOLE SODIUM 40 MG: 40 TABLET, DELAYED RELEASE ORAL at 05:42

## 2022-07-14 RX ADMIN — PANTOPRAZOLE SODIUM 40 MG: 40 TABLET, DELAYED RELEASE ORAL at 16:51

## 2022-07-14 NOTE — PHYSICAL THERAPY NOTE
Physical Therapy Evaluation and treatment session    Performed at least 2 patient identifiers during session:  Patient Active Problem List   Diagnosis    Cellulitis of finger of right hand    Major depressive disorder    Type 2 diabetes mellitus with stage 4 chronic kidney disease, with long-term current use of insulin (HCC)    Anxiety    CAD (coronary artery disease)    Osteoarthritis of left knee    Anemia    Abnormal LFTs    S/P cervical spinal fusion    Head lump    Diabetic ulcer of toe of right foot associated with type 2 diabetes mellitus, with muscle involvement without evidence of necrosis (HCC)    HTN (hypertension)    Skin ulcer of hand, limited to breakdown of skin (HCC)    Chronic bronchitis (HCC)    Severe episode of recurrent major depressive disorder, without psychotic features ([facility] )    Memory impairment    Stage 4 chronic kidney disease ([facility] )    Morbid obesity with BMI of 45 0-49 9, adult ([facility] )    History of heart artery stent    Urinary tract infection associated with cystostomy catheter (HCC)    Neurogenic bladder    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 heart failure (HCC)    Prolonged QT interval    Other proteinuria    Gastroesophageal reflux disease without esophagitis    Acquired hypothyroidism    Mixed hyperlipidemia    Other artificial openings of urinary tract status (HCC)    Continuous opioid dependence (HCC)    Adrenal nodule (HCC)    Stable angina (HCC)    Volume overload    Encounter for examination following treatment at hospital    ALFRED (acute kidney injury) ([facility] )    Syncope    Acute renal failure superimposed on chronic kidney disease (HCC)    Chest pain    Elevated troponin I level    Hypotension    Hyponatremia    History of anemia due to chronic kidney disease    Bradycardia    NSVT (nonsustained ventricular tachycardia) ([facility] )       Past Medical History:   Diagnosis Date    Abnormal liver function     Anemia     Anxiety     Arthritis     Chronic kidney disease     stage 3    Chronic narcotic dependence (HCC)     Chronic pain disorder     lower back, hands , neck and knees    Coronary artery disease     Depression     Diabetes mellitus (HCC)     Elevated troponin 2/11/2022    GERD (gastroesophageal reflux disease)     no meds at present    Heart murmur     murmur    Hyperlipidemia     Hypertension     Neurogenic bladder     Open toe wound 12/2020    right big toe open calus but is dry at present    Renal disorder     Shortness of breath     exertion    Sleep apnea     doesn't use cpap    Suprapubic catheter [facility])        Past Surgical History:   Procedure Laterality Date    AMPUTATION      ANGIOPLASTY  2017    5    BREAST EXCISIONAL BIOPSY Left     BREAST SURGERY      CARDIAC CATHETERIZATION      CARDIAC CATHETERIZATION N/A 7/1/2022    Procedure: Cardiac catheterization;  Surgeon: Violette Ordoñez MD;  Location: [facility]; Service: Cardiology    CARDIAC CATHETERIZATION N/A 7/1/2022    Procedure: Cardiac pci;  Surgeon: Violette Ordoñez MD;  Location: [facility];   Service: Cardiology    CARPAL TUNNEL RELEASE Bilateral     CERVICAL FUSION      HYSTERECTOMY  2008    IR SUPRAPUBIC CATHETER PLACEMENT  6/15/2021    KNEE SURGERY      OOPHORECTOMY  2008    MI EXC SKIN BENIG 3 1-4 CM REMAINDR BODY N/A 12/21/2020    Procedure: EXCISION SEBACEOUS CYST X 5 SCALP;  Surgeon: Misbah Londono MD;  Location: [facility] MAIN OR;  Service: General    TOE AMPUTATION Left     TRIGGER FINGER RELEASE Right     4th Finger        07/14/22 1442   PT Last Visit   PT Visit Date 07/14/22   Note Type   Note type Evaluation  (and treatment)   Pain Assessment   Pain Assessment Tool FLACC   Pain Location/Orientation Location: Shoulder;Orientation: Left   Pain Rating: FLACC (Rest) - Face 0   Pain Rating: FLACC (Rest) - Legs 0   Pain Rating: FLACC (Rest) - Activity 0   Pain Rating: FLACC (Rest) - Cry 0   Pain Rating: FLACC (Rest) - Consolability 0   Score: FLACC (Rest) 0   Pain Rating: FLACC (Activity) - Face 0   Pain Rating: FLACC (Activity) - Legs 0   Pain Rating: FLACC (Activity) - Activity 0   Pain Rating: FLACC (Activity) - Cry 1   Pain Rating: FLACC (Activity) - Consolability 0   Score: FLACC (Activity) 1   Restrictions/Precautions   Weight Bearing Precautions Per Order No   Other Precautions Telemetry;Multiple lines; Fall Risk   Home Living   Type of [facility] One level;Stairs to enter with rails; Performs ADLs on one level; Able to live on main level with bedroom/bathroom  (4-6 stairs to enter)   [address] [place] [address] chair   Home Equipment Walker;Cane   Prior Function   Level of [place] Independent with ADLs and functional mobility; Needs assistance with IADLs   Lives With Daughter   Receives Help From Family   ADL Assistance Independent   IADLs Needs assistance   Falls in the last 6 months 1 to 4   Comments pt lives with daughter who is her caregiver  pt uses rollator rw vs cane for amb  pt reoprts having gotten weaker from recent medical issues  pt falls have been OOB  pt requesting exercise program   General   Family/Caregiver Present No   Cognition   Overall Cognitive Status WFL   Orientation Level Oriented X4   Subjective   Subjective a little dizzy when first standing   RUE Assessment   RUE Assessment WFL   LUE Assessment   LUE Assessment WFL   RLE Assessment   RLE Assessment WNL   LLE Assessment   LLE Assessment WNL   Vision-Basic Assessment   Current Vision Wears glasses all the time   Patient Visual Report Blurring of vision when changing focal distance   Coordination   Movements are Fluid and Coordinated 1   Sensation X  (hands and below knees)   Light Touch   RLE Light Touch Absent   LLE Light Touch Absent   LLE Light Touch Comments below knee   Transfers   Sit to Stand 6  Modified independent   Stand to Sit 6  Modified independent   Ambulation/Elevation   Gait pattern WNL; Excessively slow   Gait Assistance 6  Modified independent   Assistive Device Straight cane Distance 200'   Balance   Static Sitting Normal   Dynamic Sitting Normal   Static Standing Fair +   Dynamic Standing Fair   Ambulatory Fair +   Endurance Deficit   Endurance Deficit Yes   Endurance Deficit Description rahman   Activity Tolerance   Activity Tolerance Treatment limited secondary to medical complications (Comment)   Nurse Made Aware yes   Assessment   Assessment pt admitted with leg swelling and shortness of breath, weakness and dx with acute on chronic chf, low blood glucose, dyspnea, chest pain, nwvt, elevated troponin and is referred to PT  pt was living with daughter who is her caregiver in single floor set up with 4-6 stairs to enter  pt was using rw vs spc for walking short distances, pt indep with self care but will need assist for compression stockings  pt demonstrated mobility at baseline but with restricted activity tolerance due to rahman  pt was able to amb 200' using spc, gait slow , noted rahman at 160'  pt did not report any light headedness  pt has chronic but exacerbated deficits in strength, balance, sensation, gait stability, cardiac and pulmonary function  with activity HR was 69 and spO2 96% on RA  pt will be able to return home, pt was provided with exercise program, red theraband, instructed in each exercise and progression of exercise   recommend OPPT   Barriers to Discharge None   Goals   Patient Goals get strength and endurance back   Recommendation   PT Discharge Recommendation Home with outpatient rehabilitation   [address] Mobility Inpatient   Turning in Bed Without Bedrails 4   Lying on Back to Sitting on Edge of Flat Bed 4   Moving Bed to Chair 4   Standing Up From Chair 4   Walk in Room 4   Climb 3-5 Stairs 3   Basic Mobility Inpatient Raw Score 23   Basic Mobility Standardized Score 50 88   Highest Level Of Mobility   JH-HLM Goal 7: Walk 25 feet or more   JH-HLM Achieved 7: Walk 25 feet or more   Additional Treatment Session   Start Time 1439   End Time 1442   Treatment Assessment pt demonstrated understanding and use of theraband and home ex  performed 1-2 reps of each exercise  staring at 1 set 10 reps and increasing as tolerated to 3 sets 10 reps, 1-2 times per day   Equipment Use theraband   Additional Treatment Day 1       An AM-PAC Basic Mobility standardized score less than 42 9 suggests the patient may benefit from discharge to post-acute rehab services      History: co - morbidities, fall risk, use of assistive device, assist for iadl's,  multiple lines  Exam: impairments in locomotion, musculoskeletal, balance, cardiac, pulmonary, sensation  Clinical: unstable/unpredictable- falls OOB, cardiac  Complexity:high  Shimon Manner, PT

## 2022-07-14 NOTE — ASSESSMENT & PLAN NOTE
Lab Results   Component Value Date    EGFR 13 07/14/2022    EGFR 13 07/13/2022    EGFR 13 07/12/2022    CREATININE 3 52 (H) 07/14/2022    CREATININE 3 48 (H) 07/13/2022    CREATININE 3 60 (H) 07/12/2022     Now with ALFRED, creatinine worsened to 3 60 yesterday  · Nephrology following  · Gabapentin dose was decreased   · Hold parameters increased on BP meds to avoid hypotension  · Monitor BMP daily  · Will likely require higher creatinine in order to maintain euvolemia  · BMP in a m , if stabilizes possibility for DC within 24-48 hours

## 2022-07-14 NOTE — PROGRESS NOTES
Progress Note - Cardiology   Bianka Bun 63 y o  female MRN: [id number]  Unit/Bed#: [place] [id number] Encounter: [id number]      Assessment/Recommendations/Discussion:   1  Acute on chronic combined systolic and diastolic heart failure   ? LVEF 38%, moderate LVH, mild LA dilated, AV sclerosis, trace AR, mild MR, MV sclerosis, mild TR, June 2022   2  CAD with multiple PCIs   ? NSTEMI s/p JANET-mLCx w/ small 95% pRCA and diffuse moderate disease of LAD, July 2022   ? s/p -pRCA, August 2021   ? s/p JANET-mLAD, JANET-D1 and JANET-LCx, December 2012   3  Recent Acute renal failure on chronic kidney disease   4  Recent acute enteritis   5  Ischemic cardiomyopathy with moderate to markedly reduced LV systolic function   6  Dyslipidemia   7  Morbid obesity   8  CKD stage 4   9  Anemia of chronic kidney disease   10  Type 2 diabetes mellitus   11  PCOS   12  Fibromyalgia   13  Neurogenic bladder with suprapubic catheter in place   14  Obstructive sleep apnea, uncorrected   15  Syncope with orthostatic hypotension   16  Mild pulmonary hypertension       PLAN   Still with bilateral lower extremity edema although having good urine output   Diuretics were held yesterday, creatinine still about the same around 3 5   Currently off IV diuretics   May be reasonable to restart torsemide, likely need to accept higher level of baseline creatinine  Will defer to Nephrology when okay to restart torsemide   Would add compression stockings, these may help mobilize fluid   She has no angina symptoms at this time, EF is 38%, no need for LifeVest   Will need very close monitoring as outpatient from cardiac standpoint as well as renal standpoint    Subjective:   HPI  Feels well today, denies significant shortness of breath, still with lower extremity edema      Review of Systems: As noted in HPI  Rest of ROS is negative      Vitals:   /57   Pulse 63   Temp 98 °F (36 7 °C)   Resp 16   Ht 5' 8" (1 727 m)   Wt 122 kg (269 lb 10 oz)   SpO2 94%   BMI 41 00 kg/m²   Vitals:    07/13/22 0600 07/14/22 0538   Weight: 122 kg (269 lb 6 4 oz) 122 kg (269 lb 10 oz)       Intake/Output Summary (Last 24 hours) at 7/14/2022 1105  Last data filed at 7/14/2022 0602  Gross per 24 hour   Intake 875 ml   Output 2200 ml   Net -1325 ml       Physical Exam   Constitutional: awake, alert and oriented, in no acute distress, no obvious deformities, obese female  Head: Normocephalic, without obvious abnormality, atraumatic  Eyes: conjunctivae clear and moist  Sclera anicteric  No xanthelasmas  Pupils equal bilaterally  Extraocular motions are full  Ear nose mouth and throat: ears are symmetrical bilaterally, hearing appears to be equal bilaterally, no nasal discharge or epistaxis, oropharynx is clear with moist mucous membranes  Neck:  Trachea is midline, neck is supple, no thyromegaly or significant lymphadenopathy, there is full range of motion  Lungs: clear to auscultation bilaterally, no wheezes, no rales, no rhonchi, no accessory muscle use, breathing is nonlabored  Heart: regular rate and rhythm, S1, S2 normal, no murmur, no click, no rub and no gallop, 2+ lower extremity edema  Abdomen:  Obese, soft, non-tender; bowel sounds normal; no masses,  no organomegaly  Psychiatric:  Patient is oriented to time, place, person, mood/affect is negative for depression, anxiety, agitation, appears to have appropriate insight  Skin: Skin is warm, dry, intact  No obvious rashes or lesions on exposed extremities  Nail beds are pink with no cyanosis or clubbing      TELEMETRY:   Lab Results:  Results from last 7 days   Lab Units 07/14/22  0504   WBC Thousand/uL 6 47   HEMOGLOBIN g/dL 8 9*   HEMATOCRIT % 28 0*   PLATELETS Thousands/uL 236     Results from last 7 days   Lab Units 07/14/22  0504   POTASSIUM mmol/L 3 9   CHLORIDE mmol/L 103   CO2 mmol/L 29   BUN mg/dL 90*   CREATININE mg/dL 3 52*   CALCIUM mg/dL 8 5     Results from last 7 days   Lab Units 07/14/22  0504   POTASSIUM mmol/L 3 9   CHLORIDE mmol/L 103   CO2 mmol/L 29   BUN mg/dL 90*   CREATININE mg/dL 3 52*   CALCIUM mg/dL 8 5           Medications:    Current Facility-Administered Medications:     acetaminophen (TYLENOL) tablet 650 mg, 650 mg, Oral, Q6H PRN, Enderlin Span, PA-C, 650 mg at 07/13/22 0045    allopurinol (ZYLOPRIM) tablet 300 mg, 300 mg, Oral, Daily, Enderlin Span, PA-C, 300 mg at 07/14/22 0813    amLODIPine (NORVASC) tablet 10 mg, 10 mg, Oral, Daily, Obdulia Ngo MD, 10 mg at 07/14/22 0813    aspirin (ECOTRIN LOW STRENGTH) EC tablet 81 mg, 81 mg, Oral, Daily, Enderlin Span, PA-C, 81 mg at 07/14/22 0813    atorvastatin (LIPITOR) tablet 40 mg, 40 mg, Oral, Daily, Enderlin Span, PA-C, 40 mg at 07/14/22 8510    bisoprolol (ZEBETA) tablet 2 5 mg, 2 5 mg, Oral, Daily, Bere Mejia MD, 2 5 mg at 07/14/22 0813    citalopram (CeleXA) tablet 40 mg, 40 mg, Oral, Daily, Enderlin Span, PA-C, 40 mg at 07/14/22 0813    clopidogrel (PLAVIX) tablet 75 mg, 75 mg, Oral, Daily, Enderlin Span, PA-C, 75 mg at 07/14/22 0813    gabapentin (NEURONTIN) capsule 400 mg, 400 mg, Oral, Daily, Enderlin Span, PA-C, 400 mg at 07/14/22 0813    heparin (porcine) subcutaneous injection 5,000 Units, 5,000 Units, Subcutaneous, Q8H [address], 5,000 Units at 07/14/22 0542 **AND** [CANCELED] Platelet count, , , Once, Enderlin Span, PA-C    HYDROcodone-acetaminophen [facility]) 5-325 mg per tablet 1 tablet, 1 tablet, Oral, TID PRN, Enderlin Span, PA-C, 1 tablet at 07/13/22 2304    insulin glargine (LANTUS) subcutaneous injection 20 Units 0 2 mL, 20 Units, Subcutaneous, Q12H [address], Lux Brown PA-C    insulin lispro (HumaLOG) 100 units/mL subcutaneous injection 2-12 Units, 2-12 Units, Subcutaneous, HS, Lux Brown PA-C, 6 Units at 07/13/22 2158    insulin lispro (HumaLOG) 100 units/mL subcutaneous injection 2-12 Units, 2-12 Units, Subcutaneous, TID AC, 4 Units at 07/14/22 0814 **AND** Fingerstick Glucose (POCT), , , 4x Daily AC and at bedtime, Bere Mejia MD    insulin lispro (HumaLOG) 100 units/mL subcutaneous injection 5 Units, 5 Units, Subcutaneous, TID With Meals, Heena Rodriguez PA-C    isosorbide mononitrate (IMDUR) 24 hr tablet 60 mg, 60 mg, Oral, Daily, Claudio Schmidt MD, 60 mg at 07/14/22 0814    levothyroxine tablet 50 mcg, 50 mcg, Oral, Early Morning, Heena Rodriguez PA-C, 50 mcg at 07/14/22 0543    lidocaine (LIDODERM) 5 % patch 1 patch, 1 patch, Topical, Daily, EMA CashC, 1 patch at 07/14/22 0813    melatonin tablet 6 mg, 6 mg, Oral, HS, Debi House PA-C, 6 mg at 07/13/22 2158    nitroglycerin (NITROSTAT) SL tablet 0 4 mg, 0 4 mg, Sublingual, Q5 Min PRN, Heena Rodriguez PA-C, 0 4 mg at 07/09/22 1930    nystatin (MYCOSTATIN) powder, , Topical, BID, LANRE Cash-C, Given at 07/14/22 0814    OLANZapine (ZyPREXA) tablet 5 mg, 5 mg, Oral, HS, LANRE Cash-C, 5 mg at 07/13/22 2158    ondansetron (ZOFRAN) injection 4 mg, 4 mg, Intravenous, Q6H PRN, Heena Rodriguez PA-C    pantoprazole (PROTONIX) EC tablet 40 mg, 40 mg, Oral, BID AC, LANRE Cash-C, 40 mg at 07/14/22 0542    senna (SENOKOT) tablet 8 6 mg, 1 tablet, Oral, Daily, LANRE Cash-C, 8 6 mg at 07/14/22 3418    tiZANidine (ZANAFLEX) tablet 4 mg, 4 mg, Oral, Q8H PRN, LANRE Cash-C, 4 mg at 07/13/22 0045    torsemide (DEMADEX) tablet 40 mg, 40 mg, Oral, Daily, Ladi Yan MD, 40 mg at 07/14/22 8443    This note was completed in part utilizing M-Modal Fluency Direct Software  Grammatical errors, random word insertions, spelling mistakes, and incomplete sentences may be an occasional consequence of this system secondary to software limitations, ambient noise, and hardware issues  If you have any questions or concerns about the content, text, or information contained within the body of this dictation, please contact the provider for clarification      Kaleb Ornelas DO, [facility]  7/14/2022 11:05 AM

## 2022-07-14 NOTE — PROGRESS NOTES
Follow up Consultation    Nephrology   Enrique Canales 63 y o  female MRN: [id number]  [facility] [address] [facility] Encounter: [id number]      Physician Requesting Consult: Jaki Mcmahon Pe*        ASSESSMENT/PLAN:  68-year-old female with multiple comorbidities including uncontrolled diabetes, CKD stage 4, CHF, CAD, chronic suprapubic catheter admitted with worsening shortness of breath  Nephrology following for acute kidney injury  Acute kidney injury on CKD stage 4:  ALFRED multifactorial most likely secondary to cardiorenal syndrome with volume overload plus recent episode of acute kidney injury in June 2022 + some component of CKD progression  After review of records [facility] as well as Care everywhere it appears that the patient has a baseline Creatinine of 2 2-2 9 mg/dL  patient was admitted with a creatinine of 2 94 mg/dL on 07/06/2022  patient's creatinine today is at 3 52 mg/dL improved from prior  Creatinine appears leveled  May need to accept her baseline creatinine for euvolemia  Appears to be in diuretic phase of ATN with good urine output despite no diuretics however still slightly positive overall volume status wise  Start torsemide 40 mg p o  Q day  No acute indication for dialysis at this time  Had a detailed discussion with the patient if renal parameters continue to deteriorate despite hypovolemia then we may need to initiate renal replacement therapy all renal replacement therapy risks and benefits were discussed in depth verbal consent was obtained  check BMP in a m  Await renal recovery  Optimize hemodynamic status to avoid delay in renal recovery  Place on a renal diet when allowed diet order  Avoid nephrotoxins, adjust meds to appropriate GFR  Strict I/O    Daily weights  Urinary retention protocol if patient does not have a [place]  Most likely has underlying CKD secondary to diabetic kidney disease plus obesity did have a filtration plus prior episode of acute kidney injury non dialysis requiring plus cardiorenal syndrome plus obesity related FSGS  will need to set up patient for follow up with Nephrology as an outpatient post hospitalization  for nephrology as an outpatient patient follows up with Dr Ata Sales  If creatinine and volume status continues to be stable in the next 24-48 hours on the diuretic regimen then patient may be stable for discharge    Blood pressure/hypertension:  current medications:  Norvasc 10 mg p o  Q day, bisoprolol 2 5 mg p o  Q day, Imdur 60 mg p o  Q day  recommendations:  Start torsemide 40 mg p o  Q day  Optimize hemodynamics  Maintain MAP > 65mmHg  Avoid BP fluctuations  H/H/anemia:  most recent hemoglobin at 8 9 grams/deciliter  maintain hemoglobin greater than 8 grams/deciliter    Acid-base electrolytes:    Electrolytes:      Acid-base:    Most recent bicarb at 29 stable    Other medical problems:  Proteinuria: Most recent microalbumin creatinine ratio 3 6 g as of 06/29/2022  Diabetes:  Management per primary team   On insulin  Most recent A1c 7 3%  CHF:  Management per primary team   Follow-up with cardiology most recent echo with EF of 38%  Start torsemide 40 mg p o  Q day  Check daily weights  chronic suprapubic Cam[place]:  Follow-up with Urology      Thanks for the consult  Will continue to follow  Please call with questions/ concerns  Above-mentioned orders and Plan in terms of acute kidney injury was discussed with the team and Cardiology in depth via tiger text    Neeraj Kellogg MD, [facility], 7/14/2022, 8:31 AM              Objective :   Patient seen and examined in her room no hemodynamic issues remains afebrile urine output close to 2 6 L last 24 hours overall stable without oxygen satting well no shortness of breath edema improving  No uremic symptoms  Weight stable since yesterday despite significant urine output and not getting dose of torsemide      PHYSICAL EXAM  /57   Pulse 63   Temp 98 °F (36 7 °C)   Resp 16   Ht 5' 8" (1 727 m)   Wt 122 kg (269 lb 10 oz)   SpO2 94%   BMI 41 00 kg/m²   Temp (24hrs), Av °F (36 7 °C), Min:98 °F (36 7 °C), Max:98 1 °F (36 7 °C)        Intake/Output Summary (Last 24 hours) at 2022 0831  Last data filed at 2022 0602  Gross per 24 hour   Intake 1115 ml   Output 2600 ml   Net -1485 ml       I/O last 24 hours: In: 1115 [P O :1115]  Out: 2600 [Urine:2600]      Current Weight: Weight - Scale: 122 kg (269 lb 10 oz)  First Weight: Weight - Scale: 135 kg (298 lb 1 oz)  Physical Exam  Vitals and nursing note reviewed  Constitutional:       General: She is not in acute distress  Appearance: Normal appearance  She is obese  She is not ill-appearing, toxic-appearing or diaphoretic  HENT:      Head: Normocephalic and atraumatic  Mouth/Throat:      Mouth: Mucous membranes are moist       Pharynx: Oropharynx is clear  No oropharyngeal exudate  Eyes:      General: No scleral icterus  Conjunctiva/sclera: Conjunctivae normal    Cardiovascular:      Rate and Rhythm: Normal rate  Heart sounds: Normal heart sounds  No murmur heard  No friction rub  Pulmonary:      Effort: Pulmonary effort is normal  No respiratory distress  Breath sounds: Normal breath sounds  No stridor  No wheezing  Abdominal:      General: There is no distension  Palpations: Abdomen is soft  There is no mass  Tenderness: There is no abdominal tenderness  Musculoskeletal:         General: Swelling present  Cervical back: Normal range of motion and neck supple  No rigidity  Comments: Trace edema bilateral lower extremities   Skin:     General: Skin is warm  Coloration: Skin is not jaundiced  Neurological:      General: No focal deficit present  Mental Status: She is alert and oriented to person, place, and time     Psychiatric:         Mood and Affect: Mood normal          Behavior: Behavior normal              Review of Systems   Constitutional: Negative for appetite change, chills, fatigue and fever  HENT: Negative for congestion  Respiratory: Negative for cough and shortness of breath  Cardiovascular: Positive for leg swelling  Gastrointestinal: Negative for abdominal pain, constipation, diarrhea, nausea and vomiting  Genitourinary: Negative for dysuria  Musculoskeletal: Negative for back pain  Neurological: Negative for headaches  Psychiatric/Behavioral: Negative for agitation and confusion  All other systems reviewed and are negative        Scheduled Meds:  Current Facility-Administered Medications   Medication Dose Route Frequency Provider Last Rate    acetaminophen  650 mg Oral Q6H PRN Anastacia Alex PA-C      allopurinol  300 mg Oral Daily Anastacia Alex PA-C      amLODIPine  10 mg Oral Daily Rainer Anderson MD      aspirin  81 mg Oral Daily Anastacia Alex PA-C      atorvastatin  40 mg Oral Daily Anastacia Alex [place]      bisoprolol  2 5 mg Oral Daily Bere Salcedo MD      citalopram  40 mg Oral Daily Anastacia Alex [place]      clopidogrel  75 mg Oral Daily Anastacia Alex [place]      gabapentin  400 mg Oral Daily Anastacia Alex PA-C      heparin (porcine)  5,000 Units Subcutaneous [facility] Nanci Atwood      HYDROcodone-acetaminophen  1 tablet Oral TID PRN Anastacia Alex PA-C      insulin glargine  15 Units Subcutaneous Q12H [address] [place]      insulin lispro  2-12 Units Subcutaneous HS Anastacia Alex PA-C      insulin lispro  2-12 Units Subcutaneous TID AC Bere Salcedo MD      insulin lispro  3 Units Subcutaneous TID With Meals Anastacia Alex PA-C      isosorbide mononitrate  60 mg Oral Daily Rainer Anderson MD      levothyroxine  50 mcg Oral Early Morning Anastacia Alex PA-C      lidocaine  1 patch Topical Daily Nanci Atwood      melatonin  6 mg Oral HS Debi Cavazos PA-C      nitroglycerin  0 4 mg Sublingual Q5 Min PRN Anastacia Alex PA-C      nystatin   Topical BID Anastacia Alex PA-C      OLANZapine  5 mg Oral HS Lorette Guard, SETH      ondansetron  4 mg Intravenous Q6H PRN Lorette Guard, SETH      pantoprazole  40 mg Oral BID  Lorsally Rodriguez, SETH      senna  1 tablet Oral Daily Lottie Guard, [place]      tiZANidine  4 mg Oral Q8H PRN Lorette Guard, SETH      torsemide  40 mg Oral Daily Isrrael Cali MD         PRN Meds:   acetaminophen    HYDROcodone-acetaminophen    nitroglycerin    ondansetron    tiZANidine    Continuous Infusions:       Invasive Devices: Invasive Devices  Report    Peripheral Intravenous Line  Duration           Peripheral IV 07/13/22 Left Forearm 1 day          Drain  Duration           Suprapubic Catheter Non-latex 16 Fr  280 days                  LABORATORY:    Results from last 7 days   Lab Units 07/14/22  0504 07/13/22  0517 07/12/22  0524 07/11/22  0557 07/10/22  0449 07/09/22  0506 07/08/22  0536   WBC Thousand/uL 6 47 6 49  --  6 04 5 00  --  6 96   HEMOGLOBIN g/dL 8 9* 8 2*  --  8 3* 9 0*  --  7 9*   HEMATOCRIT % 28 0* 25 6*  --  26 6* 28 7*  --  25 5*   PLATELETS Thousands/uL 236 228  --  259 269  --  283   POTASSIUM mmol/L 3 9 3 8 4 1 4 2 4 1 4 2 4 3   CHLORIDE mmol/L 103 102 102 103 102 104 106   CO2 mmol/L 29 28 29 28 30 28 26   BUN mg/dL 90* 93* 89* 86* 76* 73* 66*   CREATININE mg/dL 3 52* 3 48* 3 60* 3 25* 3 12* 2 97* 2 98*   CALCIUM mg/dL 8 5 8 3 8 1* 8 5 8 4 7 9* 7 6*   MAGNESIUM mg/dL  --   --   --   --   --   --  1 8      rest all reviewed    RADIOLOGY:  XR chest 1 view portable   Final Result by Jacob Pruett MD (07/07 5976)      Unchanged enlargement of the cardiomediastinal silhouette  Possible small bilateral pleural effusions  Decreased right basilar opacity  Workstation performed: [id number]           Rest all reviewed    Portions of the record may have been created with voice recognition software  Occasional wrong word or "sound a like" substitutions may have occurred due to the inherent limitations of voice recognition software   Read the chart carefully and recognize, using context, where substitutions have occurred  If you have any questions, please contact the dictating provider

## 2022-07-14 NOTE — ASSESSMENT & PLAN NOTE
Lab Results   Component Value Date    HGBA1C 7 3 (A) 03/29/2022       Recent Labs     07/13/22  1559 07/13/22  2105 07/14/22  0721 07/14/22  1129   POCGLU 285* 258* 218* 360*       Blood Sugar Average: Last 72 hrs:  (P) 962 [id number]   Patient is maintained on Lantus 50 units b i d  And scheduled Humalog 20 units t i d  With associated sliding scale  · with hypoglycemia during admission, now with hyperglycemia  · Basal insulin re-initiated starting at 10 units Q 12, will increase to 20 units q 12 given continued hyperglycemia  · Scheduled Humalog 5 units t i d   With meals added  · Sliding scale insulin a c  HS  · Hypoglycemia protocol

## 2022-07-14 NOTE — ASSESSMENT & PLAN NOTE
Wt Readings from Last 3 Encounters:   07/14/22 122 kg (269 lb 10 oz)   07/06/22 136 kg (300 lb)   07/02/22 128 kg (281 lb 12 oz)     With a history of combined systolic and diastolic CHF, most recent echocardiogram revealing ejection fraction of 30%    · Patient recently admitted and underwent cardiac catheterization on 07/01 with PCI to the mid circumflex artery  · Followed up with Cardiology today, noted 20 lb weight gain since discharge 4 days ago  · Has been compliant with medications, maintained on Demadex 40 mg daily as an outpatient  · BNP elevated at 14,930  · Baseline weight approximately 280  · Weight on arrival 298, down to 269 today   · Monitor daily weights, monitor I's and O's  · Patient was placed on a Bumex drip, switched to Demadex 40 mg PO  · Will likely require higher level creatinine in order to maintain euvolemia  · Was re-initiated on Demadex 40 mg daily today, will monitor creatinine tomorrow

## 2022-07-14 NOTE — PROGRESS NOTES
[address] [facility]  Progress Note - Osmani Bagley 1960, 63 y o  female MRN: [id number]  Unit/Bed#: [address] [place] 206-01 Encounter: [id number]  Primary Care Provider: CHERELLE Scott   Date and time admitted to hospital: 7/6/2022  4:59 PM    * 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 heart failure [facility])  Assessment & Plan  Wt Readings from Last 3 Encounters:   07/14/22 122 kg (269 lb 10 oz)   07/06/22 136 kg (300 lb)   07/02/22 128 kg (281 lb 12 oz)     With a history of combined systolic and diastolic CHF, most recent echocardiogram revealing ejection fraction of 30%    · Patient recently admitted and underwent cardiac catheterization on 07/01 with PCI to the mid circumflex artery  · Followed up with Cardiology today, noted 20 lb weight gain since discharge 4 days ago  · Has been compliant with medications, maintained on Demadex 40 mg daily as an outpatient  · BNP elevated at 14,930  · Baseline weight approximately 280  · Weight on arrival 298, down to 269 today   · Monitor daily weights, monitor I's and O's  · Patient was placed on a Bumex drip, switched to Demadex 40 mg PO  · Will likely require higher level creatinine in order to maintain euvolemia  · Was re-initiated on Demadex 40 mg daily today, will monitor creatinine tomorrow      NSVT (nonsustained ventricular tachycardia) ([facility] [address] )  Assessment & Plan  Patient was noted for an 8 beat V tach on telemetry, asymptomatic  · Beta blocker was on hold with prior episode of bradycardia, will resume  · Magnesium normal  · Telemetry monitoring has been discontinued    Mixed hyperlipidemia  Assessment & Plan  · Continue Lipitor daily     Acquired hypothyroidism  Assessment & Plan  · Continue Synthroid 50 mcg daily    Stage 4 chronic kidney disease [facility])  Assessment & Plan  Lab Results   Component Value Date    EGFR 13 07/14/2022    EGFR 13 07/13/2022    EGFR 13 07/12/2022    CREATININE 3 52 (H) 07/14/2022    CREATININE 3 48 (H) 07/13/2022    CREATININE 3 60 (H) 07/12/2022     Now with ALFRED, creatinine worsened to 3 60 yesterday  · Nephrology following  · Gabapentin dose was decreased   · Hold parameters increased on BP meds to avoid hypotension  · Monitor BMP daily  · Will likely require higher creatinine in order to maintain euvolemia  · BMP in a m , if stabilizes possibility for DC within 24-48 hours    Anemia  Assessment & Plan  · With history of anemia of chronic disease  · Hemoglobin stable at baseline    CAD (coronary artery disease)  Assessment & Plan  Status post stenting in 2012, then revised in 2017  · Known chronic total occlusion of RCA  · Most recent cardiac catheterization on 07/01 with PCI to mid circumflex  · Continue is Plavix, Lipitor, aspirin, Imdur, bisoprolol    Type 2 diabetes mellitus with stage 4 chronic kidney disease, with long-term current use of insulin [facility])  Assessment & Plan  Lab Results   Component Value Date    HGBA1C 7 3 (A) 03/29/2022       Recent Labs     07/13/22  1559 07/13/22  2105 07/14/22  0721 07/14/22  1129   POCGLU 285* 258* 218* 360*       Blood Sugar Average: Last 72 hrs:  (P) 310 [id number]   Patient is maintained on Lantus 50 units b i d  And scheduled Humalog 20 units t i d  With associated sliding scale  · with hypoglycemia during admission, now with hyperglycemia  · Basal insulin re-initiated starting at 10 units Q 12, will increase to 20 units q 12 given continued hyperglycemia  · Scheduled Humalog 5 units t i d  With meals added  · Sliding scale insulin a c  HS  · Hypoglycemia protocol        VTE Pharmacologic Prophylaxis:   Moderate Risk (Score 3-4) - Pharmacological DVT Prophylaxis Ordered: heparin  Patient Centered Rounds: I performed bedside rounds with nursing staff today  Discussions with Specialists or Other Care Team Provider:  Nephrology    Education and Discussions with Family / Patient: Patient declined call to        Time Spent for Care: 60 minutes  More than 50% of total time spent on counseling and coordination of care as described above  Current Length of Stay: 8 day(s)  Current Patient Status: Inpatient   Certification Statement: The patient will continue to require additional inpatient hospital stay due to Monitoring of creatinine  Discharge Plan: Anticipate discharge in 24-48 hrs to discharge location to be determined pending rehab evaluations  Code Status: Level 1 - Full Code    Subjective:   Patient is seen in chair bedside  She reports that she is feeling well and is requesting of physical therapy consultation as she has been in the bed a lot  She is hopeful to be getting out of the hospital soon however she understands that she needs to stay at this time in order to continue to monitor her labs  She denies any associated chest pain, shortness a breath, nausea, vomiting, diarrhea, constipation  Objective:     Vitals:   Temp (24hrs), Av °F (36 7 °C), Min:98 °F (36 7 °C), Max:98 1 °F (36 7 °C)    Temp:  [98 °F (36 7 °C)-98 1 °F (36 7 °C)] 98 °F (36 7 °C)  HR:  [55-63] 63  Resp:  [16-20] 16  BP: (132-136)/(57) 132/57  SpO2:  [94 %-96 %] 94 %  Body mass index is 41 kg/m²  Input and Output Summary (last 24 hours): Intake/Output Summary (Last 24 hours) at 2022 1210  Last data filed at 2022 0602  Gross per 24 hour   Intake 875 ml   Output 2200 ml   Net -1325 ml       Physical Exam:   Physical Exam  Vitals and nursing note reviewed  Constitutional:       General: She is not in acute distress  Appearance: Normal appearance  She is obese  She is not ill-appearing, toxic-appearing or diaphoretic  Eyes:      General: No scleral icterus  Conjunctiva/sclera: Conjunctivae normal    Cardiovascular:      Rate and Rhythm: Normal rate and regular rhythm  Heart sounds: No murmur heard  No friction rub  No gallop  Pulmonary:      Effort: Pulmonary effort is normal  No respiratory distress        Breath sounds: Normal breath sounds  No stridor  No wheezing, rhonchi or rales  Chest:      Chest wall: No tenderness  Abdominal:      General: Abdomen is flat  Bowel sounds are normal  There is no distension  Palpations: Abdomen is soft  Tenderness: There is no abdominal tenderness  Musculoskeletal:      Right lower leg: Edema present  Left lower leg: Edema present  Comments: Mild bilateral lower extremity edema, 1+   Skin:     General: Skin is warm and dry  Capillary Refill: Capillary refill takes less than 2 seconds  Coloration: Skin is not jaundiced or pale  Neurological:      General: No focal deficit present  Mental Status: She is alert and oriented to person, place, and time  Mental status is at baseline  Additional Data:     Labs:  Results from last 7 days   Lab Units 07/14/22  0504 07/13/22  0517   WBC Thousand/uL 6 47 6 49   HEMOGLOBIN g/dL 8 9* 8 2*   HEMATOCRIT % 28 0* 25 6*   PLATELETS Thousands/uL 236 228   NEUTROS PCT %  --  63   LYMPHS PCT %  --  23   MONOS PCT %  --  7   EOS PCT %  --  6     Results from last 7 days   Lab Units 07/14/22  0504   SODIUM mmol/L 142   POTASSIUM mmol/L 3 9   CHLORIDE mmol/L 103   CO2 mmol/L 29   BUN mg/dL 90*   CREATININE mg/dL 3 52*   ANION GAP mmol/L 10   CALCIUM mg/dL 8 5   GLUCOSE RANDOM mg/dL 231*         Results from last 7 days   Lab Units 07/14/22  1129 07/14/22  0721 07/13/22  2105 07/13/22  1559 07/13/22  1105 07/13/22  0724 07/12/22  2126 07/12/22  1615 07/12/22  1116 07/12/22  0739 07/11/22  2107 07/11/22  1615   POC GLUCOSE mg/dl 360* 218* 258* 285* 311* 279* 345* 335* 346* 262* 420* 369*               Lines/Drains:  Invasive Devices  Report    Peripheral Intravenous Line  Duration           Peripheral IV 07/13/22 Left Forearm 1 day          Drain  Duration           Suprapubic Catheter Non-latex 16 Fr  280 days                      Imaging: No pertinent imaging reviewed      Recent Cultures (last 7 days):         Last 24 Hours Medication List:   Current Facility-Administered Medications   Medication Dose Route Frequency Provider Last Rate    acetaminophen  650 mg Oral Q6H PRN Ravi Nickerson PA-C      allopurinol  300 mg Oral Daily Ravi Nickerson PA-C      amLODIPine  10 mg Oral Daily Nyasia Armstrong MD      aspirin  81 mg Oral Daily Ravi Nickerson PA-C      atorvastatin  40 mg Oral Daily [place]      bisoprolol  2 5 mg Oral Daily Bere Richards MD      citalopram  40 mg Oral Daily [place]      clopidogrel  75 mg Oral Daily [place]      gabapentin  400 mg Oral Daily Ravi Nickerson PA-C      heparin (porcine)  5,000 Units Subcutaneous [facility] [place]      HYDROcodone-acetaminophen  1 tablet Oral TID PRN Ravi Nickerson PA-C      insulin glargine  20 Units Subcutaneous Q12H [facility] [place], [place]      insulin lispro  2-12 Units Subcutaneous HS Ravi Nickerson PA-C      insulin lispro  2-12 Units Subcutaneous TID  Bere Richards MD      insulin lispro  5 Units Subcutaneous TID With Meals Ravi Nickerson PA-C      isosorbide mononitrate  60 mg Oral Daily Nyasia Armstrong MD      levothyroxine  50 mcg Oral Early Morning Ravi Nickerson PA-C      lidocaine  1 patch Topical Daily [place]      melatonin  6 mg Oral HS Debi House PA-C      nitroglycerin  0 4 mg Sublingual Q5 Min PRN Ravi Nickerson PA-C      nystatin   Topical BID [place]      OLANZapine  5 mg Oral HS Ravi Nickerson PA-C      ondansetron  4 mg Intravenous Q6H PRN Ravi Nickerson PA-C      pantoprazole  40 mg Oral BID AC Ravi Nickerson PA-C      senna  1 tablet Oral Daily [place], [place]      tiZANidine  4 mg Oral Q8H PRN Ravi Nickerson PA-C      torsemide  40 mg Oral Daily Laura Manzanares MD          Today, Patient Was Seen By: Ravi Nickerson PA-C    **Please Note: This note may have been constructed using a voice recognition system  **

## 2022-07-15 ENCOUNTER — APPOINTMENT (INPATIENT)
Dept: RADIOLOGY | Facility: HOSPITAL | Age: 62
DRG: 291 | End: 2022-07-15
Attending: INTERNAL MEDICINE
Payer: COMMERCIAL

## 2022-07-15 LAB
ANION GAP SERPL CALCULATED.3IONS-SCNC: 10 MMOL/L (ref 4–13)
BUN SERPL-MCNC: 90 MG/DL (ref 5–25)
CALCIUM SERPL-MCNC: 8.5 MG/DL (ref 8.3–10.1)
CHLORIDE SERPL-SCNC: 104 MMOL/L (ref 100–108)
CO2 SERPL-SCNC: 28 MMOL/L (ref 21–32)
CREAT SERPL-MCNC: 3.56 MG/DL (ref 0.6–1.3)
ERYTHROCYTE [DISTWIDTH] IN BLOOD BY AUTOMATED COUNT: 14.9 % (ref 11.6–15.1)
GFR SERPL CREATININE-BSD FRML MDRD: 13 ML/MIN/1.73SQ M
GLUCOSE SERPL-MCNC: 170 MG/DL (ref 65–140)
GLUCOSE SERPL-MCNC: 181 MG/DL (ref 65–140)
GLUCOSE SERPL-MCNC: 206 MG/DL (ref 65–140)
GLUCOSE SERPL-MCNC: 219 MG/DL (ref 65–140)
GLUCOSE SERPL-MCNC: 232 MG/DL (ref 65–140)
HCT VFR BLD AUTO: 26.7 % (ref 34.8–46.1)
HGB BLD-MCNC: 8.3 G/DL (ref 11.5–15.4)
MCH RBC QN AUTO: 31.9 PG (ref 26.8–34.3)
MCHC RBC AUTO-ENTMCNC: 31.1 G/DL (ref 31.4–37.4)
MCV RBC AUTO: 103 FL (ref 82–98)
PLATELET # BLD AUTO: 216 THOUSANDS/UL (ref 149–390)
PMV BLD AUTO: 10.5 FL (ref 8.9–12.7)
POTASSIUM SERPL-SCNC: 3.6 MMOL/L (ref 3.5–5.3)
RBC # BLD AUTO: 2.6 MILLION/UL (ref 3.81–5.12)
SODIUM SERPL-SCNC: 142 MMOL/L (ref 136–145)
WBC # BLD AUTO: 5.96 THOUSAND/UL (ref 4.31–10.16)

## 2022-07-15 PROCEDURE — 76937 US GUIDE VASCULAR ACCESS: CPT | Performed by: INTERNAL MEDICINE

## 2022-07-15 PROCEDURE — C1750 CATH, HEMODIALYSIS,LONG-TERM: HCPCS

## 2022-07-15 PROCEDURE — NC001 PR NO CHARGE: Performed by: INTERNAL MEDICINE

## 2022-07-15 PROCEDURE — 76937 US GUIDE VASCULAR ACCESS: CPT

## 2022-07-15 PROCEDURE — 80048 BASIC METABOLIC PNL TOTAL CA: CPT | Performed by: PHYSICIAN ASSISTANT

## 2022-07-15 PROCEDURE — 99152 MOD SED SAME PHYS/QHP 5/>YRS: CPT

## 2022-07-15 PROCEDURE — 02H633Z INSERTION OF INFUSION DEVICE INTO RIGHT ATRIUM, PERCUTANEOUS APPROACH: ICD-10-PCS | Performed by: INTERNAL MEDICINE

## 2022-07-15 PROCEDURE — 99152 MOD SED SAME PHYS/QHP 5/>YRS: CPT | Performed by: INTERNAL MEDICINE

## 2022-07-15 PROCEDURE — 99232 SBSQ HOSP IP/OBS MODERATE 35: CPT | Performed by: PHYSICIAN ASSISTANT

## 2022-07-15 PROCEDURE — 77001 FLUOROGUIDE FOR VEIN DEVICE: CPT | Performed by: INTERNAL MEDICINE

## 2022-07-15 PROCEDURE — 36558 INSERT TUNNELED CV CATH: CPT | Performed by: INTERNAL MEDICINE

## 2022-07-15 PROCEDURE — 36558 INSERT TUNNELED CV CATH: CPT

## 2022-07-15 PROCEDURE — 99232 SBSQ HOSP IP/OBS MODERATE 35: CPT

## 2022-07-15 PROCEDURE — 0JH63XZ INSERTION OF TUNNELED VASCULAR ACCESS DEVICE INTO CHEST SUBCUTANEOUS TISSUE AND FASCIA, PERCUTANEOUS APPROACH: ICD-10-PCS | Performed by: INTERNAL MEDICINE

## 2022-07-15 PROCEDURE — 99232 SBSQ HOSP IP/OBS MODERATE 35: CPT | Performed by: INTERNAL MEDICINE

## 2022-07-15 PROCEDURE — C1894 INTRO/SHEATH, NON-LASER: HCPCS

## 2022-07-15 PROCEDURE — 85027 COMPLETE CBC AUTOMATED: CPT | Performed by: PHYSICIAN ASSISTANT

## 2022-07-15 PROCEDURE — 82948 REAGENT STRIP/BLOOD GLUCOSE: CPT

## 2022-07-15 RX ORDER — MIDAZOLAM HYDROCHLORIDE 2 MG/2ML
INJECTION, SOLUTION INTRAMUSCULAR; INTRAVENOUS CODE/TRAUMA/SEDATION MEDICATION
Status: DISCONTINUED | OUTPATIENT
Start: 2022-07-15 | End: 2022-07-19 | Stop reason: HOSPADM

## 2022-07-15 RX ORDER — FENTANYL CITRATE 50 UG/ML
INJECTION, SOLUTION INTRAMUSCULAR; INTRAVENOUS CODE/TRAUMA/SEDATION MEDICATION
Status: DISCONTINUED | OUTPATIENT
Start: 2022-07-15 | End: 2022-07-19 | Stop reason: HOSPADM

## 2022-07-15 RX ORDER — TORSEMIDE 20 MG/1
40 TABLET ORAL 2 TIMES DAILY
Status: DISCONTINUED | OUTPATIENT
Start: 2022-07-15 | End: 2022-07-18

## 2022-07-15 RX ADMIN — LIDOCAINE 1 PATCH: 50 PATCH CUTANEOUS at 09:45

## 2022-07-15 RX ADMIN — AMLODIPINE BESYLATE 10 MG: 10 TABLET ORAL at 09:38

## 2022-07-15 RX ADMIN — INSULIN LISPRO 8 UNITS: 100 INJECTION, SOLUTION INTRAVENOUS; SUBCUTANEOUS at 00:00

## 2022-07-15 RX ADMIN — MELATONIN TAB 3 MG 6 MG: 3 TAB at 21:56

## 2022-07-15 RX ADMIN — INSULIN LISPRO 4 UNITS: 100 INJECTION, SOLUTION INTRAVENOUS; SUBCUTANEOUS at 21:59

## 2022-07-15 RX ADMIN — NYSTATIN: 100000 POWDER TOPICAL at 09:00

## 2022-07-15 RX ADMIN — MELATONIN TAB 3 MG 6 MG: 3 TAB at 00:00

## 2022-07-15 RX ADMIN — INSULIN LISPRO 4 UNITS: 100 INJECTION, SOLUTION INTRAVENOUS; SUBCUTANEOUS at 16:00

## 2022-07-15 RX ADMIN — GABAPENTIN 400 MG: 400 CAPSULE ORAL at 09:38

## 2022-07-15 RX ADMIN — HYDROCODONE BITARTRATE AND ACETAMINOPHEN 1 TABLET: 5; 325 TABLET ORAL at 15:59

## 2022-07-15 RX ADMIN — INSULIN GLARGINE 20 UNITS: 100 INJECTION, SOLUTION SUBCUTANEOUS at 21:59

## 2022-07-15 RX ADMIN — CLOPIDOGREL BISULFATE 75 MG: 75 TABLET ORAL at 09:38

## 2022-07-15 RX ADMIN — INSULIN GLARGINE 20 UNITS: 100 INJECTION, SOLUTION SUBCUTANEOUS at 09:37

## 2022-07-15 RX ADMIN — OLANZAPINE 5 MG: 5 TABLET, FILM COATED ORAL at 21:56

## 2022-07-15 RX ADMIN — ASPIRIN 81 MG: 81 TABLET, COATED ORAL at 09:38

## 2022-07-15 RX ADMIN — BISOPROLOL FUMARATE 2.5 MG: 5 TABLET ORAL at 09:38

## 2022-07-15 RX ADMIN — HEPARIN SODIUM 5000 UNITS: 5000 INJECTION INTRAVENOUS; SUBCUTANEOUS at 21:59

## 2022-07-15 RX ADMIN — SENNOSIDES 8.6 MG: 8.6 TABLET, FILM COATED ORAL at 09:37

## 2022-07-15 RX ADMIN — FENTANYL CITRATE 50 MCG: 50 INJECTION INTRAMUSCULAR; INTRAVENOUS at 14:59

## 2022-07-15 RX ADMIN — HYDROCODONE BITARTRATE AND ACETAMINOPHEN 1 TABLET: 5; 325 TABLET ORAL at 09:40

## 2022-07-15 RX ADMIN — CITALOPRAM HYDROBROMIDE 40 MG: 20 TABLET ORAL at 09:37

## 2022-07-15 RX ADMIN — ALLOPURINOL 300 MG: 100 TABLET ORAL at 09:37

## 2022-07-15 RX ADMIN — ISOSORBIDE MONONITRATE 60 MG: 60 TABLET, EXTENDED RELEASE ORAL at 09:37

## 2022-07-15 RX ADMIN — PANTOPRAZOLE SODIUM 40 MG: 40 TABLET, DELAYED RELEASE ORAL at 16:00

## 2022-07-15 RX ADMIN — ATORVASTATIN CALCIUM 40 MG: 40 TABLET, FILM COATED ORAL at 09:38

## 2022-07-15 RX ADMIN — TORSEMIDE 40 MG: 20 TABLET ORAL at 09:37

## 2022-07-15 RX ADMIN — LEVOTHYROXINE SODIUM 50 MCG: 50 TABLET ORAL at 05:59

## 2022-07-15 RX ADMIN — HEPARIN SODIUM 5000 UNITS: 5000 INJECTION INTRAVENOUS; SUBCUTANEOUS at 05:59

## 2022-07-15 RX ADMIN — INSULIN GLARGINE 20 UNITS: 100 INJECTION, SOLUTION SUBCUTANEOUS at 00:00

## 2022-07-15 RX ADMIN — FENTANYL CITRATE 50 MCG: 50 INJECTION INTRAMUSCULAR; INTRAVENOUS at 14:50

## 2022-07-15 RX ADMIN — PANTOPRAZOLE SODIUM 40 MG: 40 TABLET, DELAYED RELEASE ORAL at 06:00

## 2022-07-15 RX ADMIN — MIDAZOLAM 1 MG: 1 INJECTION INTRAMUSCULAR; INTRAVENOUS at 14:50

## 2022-07-15 RX ADMIN — TORSEMIDE 40 MG: 20 TABLET ORAL at 21:57

## 2022-07-15 RX ADMIN — NYSTATIN: 100000 POWDER TOPICAL at 16:02

## 2022-07-15 RX ADMIN — INSULIN LISPRO 5 UNITS: 100 INJECTION, SOLUTION INTRAVENOUS; SUBCUTANEOUS at 16:00

## 2022-07-15 RX ADMIN — HYDROCODONE BITARTRATE AND ACETAMINOPHEN 1 TABLET: 5; 325 TABLET ORAL at 21:56

## 2022-07-15 RX ADMIN — MIDAZOLAM 1 MG: 1 INJECTION INTRAMUSCULAR; INTRAVENOUS at 14:59

## 2022-07-15 NOTE — PROGRESS NOTES
Progress Note - Cardiology   Latoya Alva 63 y o  female MRN: [id number]  Unit/Bed#: [place] [id number] Encounter: [id number]      Assessment/Recommendations/Discussion:   1  Acute on chronic combined systolic and diastolic heart failure   ? LVEF 38%, moderate LVH, mild LA dilated, AV sclerosis, trace AR, mild MR, MV sclerosis, mild TR, June 2022   2  CAD with multiple PCIs   ? NSTEMI s/p JANET-mLCx w/ small 95% pRCA and diffuse moderate disease of LAD, July 2022   ? s/p -pRCA, August 2021   ? s/p JANET-mLAD, JANET-D1 and JANET-LCx, December 2012   3  Recent Acute renal failure on chronic kidney disease   4  Recent acute enteritis   5  Ischemic cardiomyopathy with moderate to markedly reduced LV systolic function   6  Dyslipidemia   7  Morbid obesity   8  CKD stage 4   9  Anemia of chronic kidney disease   10  Type 2 diabetes mellitus   11  PCOS   12  Fibromyalgia   13  Neurogenic bladder with suprapubic catheter in place   14  Obstructive sleep apnea, uncorrected   15  Syncope with orthostatic hypotension   16  Mild pulmonary hypertension     PLAN   Edema has improved with compression stockings   Unfortunately renal function is still poor, likely to have recurrent readmissions for difficult to control volume status   Plan to start hemodialysis per Nephrology, PermCath will be placed today   Would continue current dose of medications and plan to start hemodialysis per Nephrology    Subjective:   HPI  Doing well, denies any shortness of breath or chest pain overnight  Plan to start HD per Nephro      Review of Systems: As noted in HPI  Rest of ROS is negative      Vitals:   /59   Pulse 59   Temp 97 6 °F (36 4 °C)   Resp 18   Ht 5' 8" (1 727 m)   Wt 123 kg (270 lb 8 1 oz)   SpO2 (!) 88%   BMI 41 13 kg/m²   Vitals:    07/14/22 0538 07/15/22 0600   Weight: 122 kg (269 lb 10 oz) 123 kg (270 lb 8 1 oz)       Intake/Output Summary (Last 24 hours) at 7/15/2022 7441  Last data filed at 7/15/2022 2090  Gross per 24 hour   Intake 840 ml   Output 2325 ml   Net -1485 ml       Physical Exam   Constitutional: awake, alert and oriented, in no acute distress, no obvious deformities, obese female  Head: Normocephalic, without obvious abnormality, atraumatic  Eyes: conjunctivae clear and moist  Sclera anicteric  No xanthelasmas  Pupils equal bilaterally  Extraocular motions are full  Ear nose mouth and throat: ears are symmetrical bilaterally, hearing appears to be equal bilaterally, no nasal discharge or epistaxis, oropharynx is clear with moist mucous membranes  Neck:  Trachea is midline, neck is supple, no thyromegaly or significant lymphadenopathy, there is full range of motion  Lungs: clear to auscultation bilaterally, no wheezes, no rales, no rhonchi, no accessory muscle use, breathing is nonlabored  Heart: regular rate and rhythm, S1, S2 normal, no murmur, no click, no rub and no gallop, 2+ lower extremity edema, compression stockings are in place  Abdomen:  Obese, soft, non-tender; bowel sounds normal; no masses,  no organomegaly  Psychiatric:  Patient is oriented to time, place, person, mood/affect is negative for depression, anxiety, agitation, appears to have appropriate insight  Skin: Skin is warm, dry, intact  No obvious rashes or lesions on exposed extremities  Nail beds are pink with no cyanosis or clubbing      TELEMETRY:   Lab Results:  Results from last 7 days   Lab Units 07/15/22  0532   WBC Thousand/uL 5 96   HEMOGLOBIN g/dL 8 3*   HEMATOCRIT % 26 7*   PLATELETS Thousands/uL 216     Results from last 7 days   Lab Units 07/15/22  0532   POTASSIUM mmol/L 3 6   CHLORIDE mmol/L 104   CO2 mmol/L 28   BUN mg/dL 90*   CREATININE mg/dL 3 56*   CALCIUM mg/dL 8 5     Results from last 7 days   Lab Units 07/15/22  0532   POTASSIUM mmol/L 3 6   CHLORIDE mmol/L 104   CO2 mmol/L 28   BUN mg/dL 90*   CREATININE mg/dL 3 56*   CALCIUM mg/dL 8 5           Medications:    Current Facility-Administered Medications:    acetaminophen (TYLENOL) tablet 650 mg, 650 mg, Oral, Q6H PRN, [place] Span, PA-C, 650 mg at 07/13/22 0045    allopurinol (ZYLOPRIM) tablet 300 mg, 300 mg, Oral, Daily, [place] Span, PA-C, 300 mg at 07/15/22 4267    amLODIPine (NORVASC) tablet 10 mg, 10 mg, Oral, Daily, Claudio Schmidt MD, 10 mg at 07/15/22 5745    aspirin (ECOTRIN LOW STRENGTH) EC tablet 81 mg, 81 mg, Oral, Daily, [place] Span, PA-C, 81 mg at 07/15/22 2096    atorvastatin (LIPITOR) tablet 40 mg, 40 mg, Oral, Daily, [place] Span, PA-C, 40 mg at 07/15/22 0990    bisoprolol (ZEBETA) tablet 2 5 mg, 2 5 mg, Oral, Daily, Bere Mejia MD, 2 5 mg at 07/14/22 0813    citalopram (CeleXA) tablet 40 mg, 40 mg, Oral, Daily, [place] Span, PA-C, 40 mg at 07/15/22 9920    clopidogrel (PLAVIX) tablet 75 mg, 75 mg, Oral, Daily, [place] Span, PA-C, 75 mg at 07/15/22 3166    gabapentin (NEURONTIN) capsule 400 mg, 400 mg, Oral, Daily, [place] Span, PA-C, 400 mg at 07/15/22 1760    heparin (porcine) subcutaneous injection 5,000 Units, 5,000 Units, Subcutaneous, Q8H [facility], 5,000 Units at 07/15/22 0559 **AND** [CANCELED] Platelet count, , , Once, [place] Span, PA-C    HYDROcodone-acetaminophen [facility]) 5-325 mg per tablet 1 tablet, 1 tablet, Oral, TID PRN, [place] Span, PA-C, 1 tablet at 07/14/22 2359    insulin glargine (LANTUS) subcutaneous injection 20 Units 0 2 mL, 20 Units, Subcutaneous, Q12H [facility], Lux Brown, PA-C, 20 Units at 07/15/22 0937    insulin lispro (HumaLOG) 100 units/mL subcutaneous injection 2-12 Units, 2-12 Units, Subcutaneous, HS, Lux Brown PA-C, 8 Units at 07/15/22 0000    insulin lispro (HumaLOG) 100 units/mL subcutaneous injection 2-12 Units, 2-12 Units, Subcutaneous, TID AC, 8 Units at 07/14/22 1653 **AND** Fingerstick Glucose (POCT), , , 4x Daily AC and at bedtime, Bere Mejia MD    insulin lispro (HumaLOG) 100 units/mL subcutaneous injection 5 Units, 5 Units, Subcutaneous, TID With Meals, Lux Brown PA-C, 5 Units at 07/14/22 1653    isosorbide mononitrate (IMDUR) 24 hr tablet 60 mg, 60 mg, Oral, Daily, Claudio Schmidt MD, 60 mg at 07/15/22 0937    levothyroxine tablet 50 mcg, 50 mcg, Oral, Early Morning, Lorloki Antonio, PA-C, 50 mcg at 07/15/22 0559    lidocaine (LIDODERM) 5 % patch 1 patch, 1 patch, Topical, Daily, Francine Antonio PA-C, 1 patch at 07/14/22 0813    melatonin tablet 6 mg, 6 mg, Oral, HS, Debi House, PA-C, 6 mg at 07/15/22 0000    nitroglycerin (NITROSTAT) SL tablet 0 4 mg, 0 4 mg, Sublingual, Q5 Min PRN, Francine Antonio, PA-C, 0 4 mg at 07/09/22 1930    nystatin (MYCOSTATIN) powder, , Topical, BID, Lorre Marisela, PA-C, Given at 07/14/22 1651    OLANZapine (ZyPREXA) tablet 5 mg, 5 mg, Oral, HS, Lorre Bustahelga, PA-C, 5 mg at 07/14/22 2359    ondansetron (ZOFRAN) injection 4 mg, 4 mg, Intravenous, Q6H PRN, Lorre Bustahelga, PA-C    pantoprazole (PROTONIX) EC tablet 40 mg, 40 mg, Oral, BID AC, Lorre Bustard, PA-C, 40 mg at 07/15/22 0600    senna (SENOKOT) tablet 8 6 mg, 1 tablet, Oral, Daily, Lorre Busvamshi, PA-C, 8 6 mg at 07/15/22 2560    tiZANidine (ZANAFLEX) tablet 4 mg, 4 mg, Oral, Q8H PRN, Lorre Busvamshi, PA-C, 4 mg at 07/13/22 0045    torsemide (DEMADEX) tablet 40 mg, 40 mg, Oral, Daily, Bobby Perkins MD, 40 mg at 07/15/22 0121    This note was completed in part utilizing [employer]  Grammatical errors, random word insertions, spelling mistakes, and incomplete sentences may be an occasional consequence of this system secondary to software limitations, ambient noise, and hardware issues  If you have any questions or concerns about the content, text, or information contained within the body of this dictation, please contact the provider for clarification      Julio Ramesh DO, [facility]  7/15/2022 9:38 AM

## 2022-07-15 NOTE — PROGRESS NOTES
[facility]  Progress Note - Hazel Handsome 1960, 63 y o  female MRN: [id number]  Unit/Bed#: [address] [place] [address] [id number] Encounter: [id number]  Primary Care Provider: CHERELLE Ortiz   Date and time admitted to hospital: 7/6/2022  4:59 PM    * 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 heart failure [facility])  Assessment & Plan  Wt Readings from Last 3 Encounters:   07/15/22 123 kg (270 lb 8 1 oz)   07/06/22 136 kg (300 lb)   07/02/22 128 kg (281 lb 12 oz)     With a history of combined systolic and diastolic CHF, most recent echocardiogram revealing ejection fraction of 30%    · Patient recently admitted and underwent cardiac catheterization on 07/01 with PCI to the mid circumflex artery  · Followed up with Cardiology today, noted 20 lb weight gain since discharge 4 days ago  · Has been compliant with medications, maintained on Demadex 40 mg daily as an outpatient  · BNP elevated at 14,930  · Baseline weight approximately 280  · Weight on arrival 298, down to 269 today   · Monitor daily weights, monitor I's and O's  · Patient was placed on a Bumex drip, switched to Demadex 40 mg PO  · Will likely require higher level creatinine in order to maintain euvolemia  · Was re-initiated on Demadex 40 mg daily yesterday  · Patient will now require hemodialysis for volume control  · To undergo PermCath placement with IR today      Stage 4 chronic kidney disease [facility])  Assessment & Plan  Lab Results   Component Value Date    EGFR 13 07/15/2022    EGFR 13 07/14/2022    EGFR 13 07/13/2022    CREATININE 3 56 (H) 07/15/2022    CREATININE 3 52 (H) 07/14/2022    CREATININE 3 48 (H) 07/13/2022     Now with ALFRED, creatinine worsened to 3 60 yesterday  · Nephrology following  · Gabapentin dose was decreased   · Hold parameters increased on BP meds to avoid hypotension  · Monitor BMP daily  · Will likely require higher creatinine in order to maintain euvolemia  · Creatinine has appeared to plateau however given likely frequent readmissions for volume status and elevated creatinine, will now require hemodialysis  · Patient to undergo PermCath placement with IR today, 7/15  · Start hemodialysis tomorrow 7/16  · Case management to help coordinate outpatient dialysis sessions    NSVT (nonsustained ventricular tachycardia) ([facility] [address] )  Assessment & Plan  Patient was noted for an 8 beat V tach on telemetry, asymptomatic  · Beta blocker was on hold with prior episode of bradycardia, will resume  · Magnesium normal  · Telemetry monitoring has been discontinued    Mixed hyperlipidemia  Assessment & Plan  · Continue Lipitor daily     Acquired hypothyroidism  Assessment & Plan  · Continue Synthroid 50 mcg daily    Anemia  Assessment & Plan  · With history of anemia of chronic disease  · Hemoglobin stable at baseline    CAD (coronary artery disease)  Assessment & Plan  Status post stenting in 2012, then revised in 2017  · Known chronic total occlusion of RCA  · Most recent cardiac catheterization on 07/01 with PCI to mid circumflex  · Continue is Plavix, Lipitor, aspirin, Imdur, bisoprolol    Type 2 diabetes mellitus with stage 4 chronic kidney disease, with long-term current use of insulin [facility])  Assessment & Plan  Lab Results   Component Value Date    HGBA1C 7 3 (A) 03/29/2022       Recent Labs     07/14/22  1607 07/14/22  2045 07/14/22  2356 07/15/22  0738   POCGLU 335* 265* 303* 170*       Blood Sugar Average: Last 72 hrs:  (P) 290 [id number]   Patient is maintained on Lantus 50 units b i d  And scheduled Humalog 20 units t i d  With associated sliding scale  · with hypoglycemia during admission, now with hyperglycemia  · Basal insulin re-initiated starting at 10 units Q 12, will increase to 20 units q 12 given continued hyperglycemia  · Scheduled Humalog 5 units t i d   With meals added  · Sliding scale insulin a c  HS  · Hypoglycemia protocol      VTE Pharmacologic Prophylaxis:   Moderate Risk (Score 3-4) - Pharmacological DVT Prophylaxis Ordered: heparin  Patient Centered Rounds: I performed bedside rounds with nursing staff today  Discussions with Specialists or Other Care Team Provider: nephrology     Education and Discussions with Family / Patient: Patient declined call to   Time Spent for Care: 20 minutes  More than 50% of total time spent on counseling and coordination of care as described above  Current Length of Stay: 9 day(s)  Current Patient Status: Inpatient   Certification Statement: The patient will continue to require additional inpatient hospital stay due to PermCath placement, initiation of dialysis  Discharge Plan: Anticipate discharge in 48-72 hrs to home  Code Status: Level 1 - Full Code    Subjective:   Patient seen resting in bed  She reports that she feels nervous about getting a PermCath today  She is sad that she has to start dialysis  She denies any associated chest pain, shortness of breath, nausea, vomiting, diarrhea, constipation  Objective:     Vitals:   Temp (24hrs), Av 8 °F (36 6 °C), Min:97 6 °F (36 4 °C), Max:98 2 °F (36 8 °C)    Temp:  [97 6 °F (36 4 °C)-98 2 °F (36 8 °C)] 97 6 °F (36 4 °C)  HR:  [58-62] 59  Resp:  [16-18] 18  BP: (128-131)/(57-59) 131/59  SpO2:  [85 %-97 %] 88 %  Body mass index is 41 13 kg/m²  Input and Output Summary (last 24 hours): Intake/Output Summary (Last 24 hours) at 7/15/2022 1105  Last data filed at 7/15/2022 0214  Gross per 24 hour   Intake 840 ml   Output 2325 ml   Net -1485 ml       Physical Exam:   Physical Exam  Vitals and nursing note reviewed  Constitutional:       General: She is not in acute distress  Appearance: Normal appearance  She is obese  She is not ill-appearing, toxic-appearing or diaphoretic  Eyes:      General: No scleral icterus  Conjunctiva/sclera: Conjunctivae normal    Cardiovascular:      Rate and Rhythm: Normal rate and regular rhythm  Heart sounds: No murmur heard  No friction rub  No gallop  Pulmonary:      Effort: Pulmonary effort is normal  No respiratory distress  Breath sounds: Normal breath sounds  No stridor  No wheezing, rhonchi or rales  Chest:      Chest wall: No tenderness  Abdominal:      General: Abdomen is flat  Bowel sounds are normal  There is no distension  Palpations: Abdomen is soft  Tenderness: There is no abdominal tenderness  Musculoskeletal:      Right lower leg: No edema  Left lower leg: No edema  Skin:     General: Skin is warm and dry  Coloration: Skin is not jaundiced or pale  Neurological:      General: No focal deficit present  Mental Status: She is alert and oriented to person, place, and time  Mental status is at baseline  Additional Data:     Labs:  Results from last 7 days   Lab Units 07/15/22  0532 07/14/22  0504 07/13/22  0517   WBC Thousand/uL 5 96   < > 6 49   HEMOGLOBIN g/dL 8 3*   < > 8 2*   HEMATOCRIT % 26 7*   < > 25 6*   PLATELETS Thousands/uL 216   < > 228   NEUTROS PCT %  --   --  63   LYMPHS PCT %  --   --  23   MONOS PCT %  --   --  7   EOS PCT %  --   --  6    < > = values in this interval not displayed       Results from last 7 days   Lab Units 07/15/22  0532   SODIUM mmol/L 142   POTASSIUM mmol/L 3 6   CHLORIDE mmol/L 104   CO2 mmol/L 28   BUN mg/dL 90*   CREATININE mg/dL 3 56*   ANION GAP mmol/L 10   CALCIUM mg/dL 8 5   GLUCOSE RANDOM mg/dL 181*         Results from last 7 days   Lab Units 07/15/22  0738 07/14/22  2356 07/14/22  2045 07/14/22  1607 07/14/22  1129 07/14/22  0721 07/13/22  2105 07/13/22  1559 07/13/22  1105 07/13/22  0724 07/12/22  2126 07/12/22  1615   POC GLUCOSE mg/dl 170* 303* 265* 335* 360* 218* 258* 285* 311* 279* 345* 335*               Lines/Drains:  Invasive Devices  Report    Peripheral Intravenous Line  Duration           Peripheral IV 07/13/22 Left Forearm 2 days          Drain  Duration           Suprapubic Catheter Non-latex 16 Fr  281 days Imaging: No pertinent imaging reviewed      Recent Cultures (last 7 days):         Last 24 Hours Medication List:   Current Facility-Administered Medications   Medication Dose Route Frequency Provider Last Rate    acetaminophen  650 mg Oral Q6H PRN Homero Condon PA-C      allopurinol  300 mg Oral Daily Homero Condon PA-C      amLODIPine  10 mg Oral Daily Yi Reagan MD      aspirin  81 mg Oral Daily Homero RodSETH moyer      atorvastatin  40 mg Oral Daily [place], [place]      bisoprolol  2 5 mg Oral Daily Bere Silvestre MD      citalopram  40 mg Oral Daily [place], [place]      clopidogrel  75 mg Oral Daily [place], [place]      gabapentin  400 mg Oral Daily Homero Condon PA-C      heparin (porcine)  5,000 Units Subcutaneous [place], [place]      HYDROcodone-acetaminophen  1 tablet Oral TID PRN Homero Condon PA-C      insulin glargine  20 Units Subcutaneous Q12H [address] [place], [place]      insulin lispro  2-12 Units Subcutaneous HS Homero Condon PA-C      insulin lispro  2-12 Units Subcutaneous TID AC Bere Silvestre MD      insulin lispro  5 Units Subcutaneous TID With Meals Homero Condon PA-C      isosorbide mononitrate  60 mg Oral Daily Yi Reagan MD      levothyroxine  50 mcg Oral Early Morning Homero Condon PA-C      lidocaine  1 patch Topical Daily [place], [place]      melatonin  6 mg Oral HS Debi House PA-C      nitroglycerin  0 4 mg Sublingual Q5 Min PRN Homero Condon PA-C      nystatin   Topical BID [place], [place]      OLANZapine  5 mg Oral HS Homero Condon PA-C      ondansetron  4 mg Intravenous Q6H PRN Homero Condon PA-C      pantoprazole  40 mg Oral BID AC Homero Condon PA-C      senna  1 tablet Oral Daily [place], [place]      tiZANidine  4 mg Oral Q8H PRN Homero Condon PA-C      torsemide  40 mg Oral Daily Kamini Singer MD          Today, Patient Was Seen By: Homero Condon PA-C    **Please Note: This note may have been constructed using a voice recognition system  **

## 2022-07-15 NOTE — INTERVAL H&P NOTE
Update: (This section must be completed if the H&P was completed greater than 24 hrs to procedure or admission)    H&P reviewed  After examining the patient, I find no changed to the H&P since it had been written  /59   Pulse 59   Temp 97 6 °F (36 4 °C)   Resp 18   Ht 5' 8" (1 727 m)   Wt 123 kg (270 lb 8 1 oz)   SpO2 (!) 88%   BMI 41 13 kg/m²     Patient re-evaluated  Accept as history and physical     We will proceed with tunneled dialysis catheter placement today      Spencer Champion MD/July 15, 2022/2:27 PM

## 2022-07-15 NOTE — BRIEF OP NOTE (RAD/CATH)
INTERVENTIONAL RADIOLOGY PROCEDURE NOTE    Date: 7/15/2022    Procedure: Tunneled dialysis catheter placement    Preoperative diagnosis:   1  CHF (congestive heart failure) ([facility])    2  Elevated troponin    3  CKD (chronic kidney disease)    4  Dyspnea    5  Chest pain    6  Hypoglycemia    7  ALFRED (acute kidney injury) ([facility] [address] )    8  Acute renal failure superimposed on stage 5 chronic kidney disease, not on chronic dialysis, unspecified acute renal failure type ([facility] [address] )         Postoperative diagnosis: Same  Surgeon: Mildred Salcedo MD     Assistant: None  No qualified resident was available  Blood loss: 5 ml    Specimens: None     Findings: Placement of a right IJ tunneled dialysis catheter  Please see the radiology report for details  Complications: None immediate      Anesthesia: conscious sedation and local

## 2022-07-15 NOTE — ASSESSMENT & PLAN NOTE
Lab Results   Component Value Date    EGFR 13 07/15/2022    EGFR 13 07/14/2022    EGFR 13 07/13/2022    CREATININE 3 56 (H) 07/15/2022    CREATININE 3 52 (H) 07/14/2022    CREATININE 3 48 (H) 07/13/2022     Now with ALFRED, creatinine worsened to 3 60 yesterday  · Nephrology following  · Gabapentin dose was decreased   · Hold parameters increased on BP meds to avoid hypotension  · Monitor BMP daily  · Will likely require higher creatinine in order to maintain euvolemia  · Creatinine has appeared to plateau however given likely frequent readmissions for volume status and elevated creatinine, will now require hemodialysis  · Patient to undergo PermCath placement with IR today, 7/15  · Start hemodialysis tomorrow 7/16  · Case management to help coordinate outpatient dialysis sessions

## 2022-07-15 NOTE — ASSESSMENT & PLAN NOTE
Wt Readings from Last 3 Encounters:   07/15/22 123 kg (270 lb 8 1 oz)   07/06/22 136 kg (300 lb)   07/02/22 128 kg (281 lb 12 oz)     With a history of combined systolic and diastolic CHF, most recent echocardiogram revealing ejection fraction of 30%    · Patient recently admitted and underwent cardiac catheterization on 07/01 with PCI to the mid circumflex artery  · Followed up with Cardiology today, noted 20 lb weight gain since discharge 4 days ago  · Has been compliant with medications, maintained on Demadex 40 mg daily as an outpatient  · BNP elevated at 14,930  · Baseline weight approximately 280  · Weight on arrival 298, down to 269 today   · Monitor daily weights, monitor I's and O's  · Patient was placed on a Bumex drip, switched to Demadex 40 mg PO  · Will likely require higher level creatinine in order to maintain euvolemia  · Was re-initiated on Demadex 40 mg daily yesterday  · Patient will now require hemodialysis for volume control  · To undergo PermCath placement with IR today

## 2022-07-15 NOTE — TELEMEDICINE
e-Consult (IPC)  - Interventional Radiology  Drew Flanagan 63 y o  female MRN: [id number]  Unit/Bed#: [place] [address] [place] [address] 206-01 Encounter: [id number]          Interventional Radiology has been consulted to evaluate Drew Flanagan    We were consulted by nephrology concerning this patient with ALFRED on CKD  IP Consult to IR  Consult performed by: Mildred Salcedo MD  Consult ordered by: Marta Lopez MD        07/15/22    Assessment/Recommendation:   Patient is a 63year-old female with history of ALFRED on CKD stage 4 now requiring hemodialysis  We will plan on tunneled dialysis catheter placement today  Please maintain NPO status  Total time spent in review of data, discussion with requesting provider and rendering advice was 5 minutes  Thank you for allowing Interventional Radiology to participate in the care of Drew Flanagan  Please don't hesitate to call or TigerText us with any questions       Mildred Salcedo MD

## 2022-07-15 NOTE — PROGRESS NOTES
Follow up Consultation    Nephrology   Aliza Cazares 63 y o  female MRN: [id number]  Unit/Bed#: [address] 206-01 Encounter: [id number]      Physician Requesting Consult: Yo Mcmahon Pe*        ASSESSMENT/PLAN:  68-year-old female with multiple comorbidities including uncontrolled diabetes, CKD stage 4, CHF, CAD, chronic suprapubic catheter admitted with worsening shortness of breath  Nephrology following for acute kidney injury  Acute kidney injury on CKD stage 4 with progression to ESRD:  ALFRED multifactorial most likely secondary to cardiorenal syndrome with volume overload plus recent episode of acute kidney injury in June 2022 + some component of CKD progression to ESRD  After review of records In [facility] as well as Care everywhere it appears that the patient has a baseline Creatinine of 2 2-2 9 mg/dL  patient was admitted with a creatinine of 2 94 mg/dL on 07/06/2022  patient's creatinine today is at 3 56 mg/dL minimally elevated  Patient does have mild uremic symptoms  May need to accept her baseline creatinine for euvolemia  Continues with good urine output however continues to gain weight  She has had approximately 3-4 read hospitalizations due to her volume status in the last year  Would benefit from being on higher doses of diuretics to help with hypervolemia and more so dialysis  For now will increase torsemide to 40 mg p o  B i d  While awaiting initiation of dialysis  IR consult placed spoke with IR with regards to PermCath placement on 07/15/2022  Plan for 1st dialysis treatment on 07/16/2022  After 1st dialysis treatment then will need to place case management consultation for outpatient dialysis placement for ESRD  Patient is interested in-home hemodialysis  Had a detailed discussion with the patient with regards to her renal status risks and benefits of renal replacement therapy and with the procedure involves consent obtained and placed in the chart on 07/15/2022  check BMP in a m  Optimize hemodynamic status to avoid delay in renal recovery  Place on a renal diet when allowed diet order  Avoid nephrotoxins, adjust meds to appropriate GFR  Strict I/O  Daily weights  Urinary retention protocol if patient does not have a Cam[place]  Most likely has underlying CKD secondary to diabetic kidney disease plus obesity did have a filtration plus prior episode of acute kidney injury non dialysis requiring plus cardiorenal syndrome plus obesity related FSGS  for nephrology as an outpatient patient follows up with Dr Stefanie Durán    Blood pressure/hypertension:  current medications:  Norvasc 10 mg p o  Q day, bisoprolol 2 5 mg p o  Q day, Imdur 60 mg p o  Q day, torsemide 40 mg p o  Q day  recommendations:  Increase torsemide to 40 mg p o  B i d  For now  Optimize hemodynamics  Maintain MAP > 65mmHg  Avoid BP fluctuations  H/H/anemia:  most recent hemoglobin at 8 3 grams/deciliter  maintain hemoglobin greater than 8 grams/deciliter  Check iron studies to see patient may benefit from supplementation    Acid-base electrolytes:    Electrolytes:      Acid-base:    Most recent bicarb at 28 stable    Other medical problems:  Proteinuria: Most recent microalbumin creatinine ratio 3 6 g as of 06/29/2022  Diabetes:  Management per primary team   On insulin  Most recent A1c 7 3%  CHF:  Management per primary team   Follow-up with cardiology most recent echo with EF of 38%  Increase torsemide to 40 mg p o  B i d  Check daily weights  Increase UF while on dialysis and get to dry weight  chronic suprapubic Camargo:  Follow-up with Urology      Thanks for the consult  Will continue to follow  Please call with questions/ concerns  Above-mentioned orders and Plan in terms of acute kidney injury was discussed with the team and Cardiology in depth in-person    Uyen Hopkins MD, FASN, 7/15/2022, 8:07 AM              Objective :   Patient seen and examined in her room earlier this a m   Hemodynamically stable remains afebrile urine output close to 2 3 L  Still with edema  Has minimal uremic symptoms slight grogginess poor appetite had a detailed discussion with the patient with regards to initiation of dialysis versus being high risk for rehospitalization recurrent for concerns for volume overload  See above    PHYSICAL EXAM  /59   Pulse 59   Temp 97 6 °F (36 4 °C)   Resp 18   Ht 5' 8" (1 727 m)   Wt 123 kg (270 lb 8 1 oz)   SpO2 (!) 88%   BMI 41 13 kg/m²   Temp (24hrs), Av 8 °F (36 6 °C), Min:97 6 °F (36 4 °C), Max:98 2 °F (36 8 °C)        Intake/Output Summary (Last 24 hours) at 7/15/2022 0807  Last data filed at 7/15/2022 0605  Gross per 24 hour   Intake 840 ml   Output 2325 ml   Net -1485 ml       I/O last 24 hours: In: 840 [P O :840]  Out: 2325 [Urine:2325]      Current Weight: Weight - Scale: 123 kg (270 lb 8 1 oz)  First Weight: Weight - Scale: 135 kg (298 lb 1 oz)  Physical Exam  Vitals and nursing note reviewed  Constitutional:       General: She is not in acute distress  Appearance: Normal appearance  She is obese  She is not ill-appearing, toxic-appearing or diaphoretic  HENT:      Head: Normocephalic and atraumatic  Mouth/Throat:      Mouth: Mucous membranes are moist       Pharynx: Oropharynx is clear  No oropharyngeal exudate  Eyes:      General: No scleral icterus  Conjunctiva/sclera: Conjunctivae normal    Cardiovascular:      Rate and Rhythm: Normal rate  Heart sounds: Normal heart sounds  No friction rub  Pulmonary:      Effort: Pulmonary effort is normal  No respiratory distress  Breath sounds: No stridor  No wheezing  Comments: Crackles at bases  Abdominal:      General: There is no distension  Palpations: Abdomen is soft  There is no mass  Tenderness: There is no abdominal tenderness  Musculoskeletal:         General: Swelling present  Cervical back: Normal range of motion and neck supple  No rigidity     Skin: General: Skin is warm  Coloration: Skin is not jaundiced  Neurological:      General: No focal deficit present  Mental Status: She is alert and oriented to person, place, and time  Psychiatric:         Mood and Affect: Mood normal          Behavior: Behavior normal              Review of Systems   Constitutional: Positive for appetite change and fatigue  Negative for chills  HENT: Negative for congestion  Respiratory: Negative for cough, shortness of breath and wheezing  Cardiovascular: Positive for leg swelling  Gastrointestinal: Negative for abdominal pain, constipation, diarrhea and vomiting  Genitourinary: Negative for dysuria  Musculoskeletal: Negative for back pain  Neurological: Negative for headaches  Psychiatric/Behavioral: Negative for agitation and confusion  All other systems reviewed and are negative        Scheduled Meds:  Current Facility-Administered Medications   Medication Dose Route Frequency Provider Last Rate    acetaminophen  650 mg Oral Q6H PRN Modesta Crawley PA-C      allopurinol  300 mg Oral Daily Modesta Crawley PA-C      amLODIPine  10 mg Oral Daily Franca Hernandez MD      aspirin  81 mg Oral Daily Modestakristal Crawley PA-C      atorvastatin  40 mg Oral Daily [place], [place]      bisoprolol  2 5 mg Oral Daily Bere Rogers MD      citalopram  40 mg Oral Daily [place]      clopidogrel  75 mg Oral Daily Modestakristal [place], [place]      gabapentin  400 mg Oral Daily Modesta Crawley PA-C      heparin (porcine)  5,000 Units Subcutaneous [facility] [place], [place]      HYDROcodone-acetaminophen  1 tablet Oral TID PRN Modesta Crawley PA-C      insulin glargine  20 Units Subcutaneous Q12H [address] [facility] [place]      insulin lispro  2-12 Units Subcutaneous HS Modesta Crawley PA-C      insulin lispro  2-12 Units Subcutaneous TID AC Bere Rogers MD      insulin lispro  5 Units Subcutaneous TID With Meals Modesta Crawley PA-C      isosorbide mononitrate  60 mg Oral Daily Isla Giron MD      levothyroxine  50 mcg Oral Early Morning Kisha Morris PA-C      lidocaine  1 patch Topical Daily [place], [place]      melatonin  6 mg Oral HS Jasmin HollowaySETH      nitroglycerin  0 4 mg Sublingual Q5 Min PRN Kisha Morris PA-C      nystatin   Topical BID [place], [place]      OLANZapine  5 mg Oral HS Kisha Morris PA-C      ondansetron  4 mg Intravenous Q6H PRN Kisha Morris PA-C      pantoprazole  40 mg Oral BID AC Kisha Morris PA-C      senna  1 tablet Oral Daily [place], [place]      tiZANidine  4 mg Oral Q8H PRN Kisha Morris PA-C      torsemide  40 mg Oral Daily Joanne Lancaster MD         PRN Meds:   acetaminophen    HYDROcodone-acetaminophen    nitroglycerin    ondansetron    tiZANidine    Continuous Infusions:       Invasive Devices: Invasive Devices  Report    Peripheral Intravenous Line  Duration           Peripheral IV 07/13/22 Left Forearm 2 days          Drain  Duration           Suprapubic Catheter Non-latex 16 Fr  281 days                  LABORATORY:    Results from last 7 days   Lab Units 07/15/22  0532 07/14/22  0504 07/13/22  0517 07/12/22  0524 07/11/22  0557 07/10/22  0449 07/09/22  0506   WBC Thousand/uL 5 96 6 47 6 49  --  6 04 5 00  --    HEMOGLOBIN g/dL 8 3* 8 9* 8 2*  --  8 3* 9 0*  --    HEMATOCRIT % 26 7* 28 0* 25 6*  --  26 6* 28 7*  --    PLATELETS Thousands/uL 216 236 228  --  259 269  --    POTASSIUM mmol/L 3 6 3 9 3 8 4 1 4 2 4 1 4 2   CHLORIDE mmol/L 104 103 102 102 103 102 104   CO2 mmol/L 28 29 28 29 28 30 28   BUN mg/dL 90* 90* 93* 89* 86* 76* 73*   CREATININE mg/dL 3 56* 3 52* 3 48* 3 60* 3 25* 3 12* 2 97*   CALCIUM mg/dL 8 5 8 5 8 3 8 1* 8 5 8 4 7 9*      rest all reviewed    RADIOLOGY:  XR chest 1 view portable   Final Result by Caroline eMng MD ([id number]/67 [id number])      Unchanged enlargement of the cardiomediastinal silhouette  Possible small bilateral pleural effusions  Decreased right basilar opacity  Workstation performed: [id number]           Rest all reviewed    Portions of the record may have been created with voice recognition software  Occasional wrong word or "sound a like" substitutions may have occurred due to the inherent limitations of voice recognition software  Read the chart carefully and recognize, using context, where substitutions have occurred  If you have any questions, please contact the dictating provider

## 2022-07-15 NOTE — ASSESSMENT & PLAN NOTE
Lab Results   Component Value Date    HGBA1C 7 3 (A) 03/29/2022       Recent Labs     07/14/22  1607 07/14/22  2045 07/14/22  2356 07/15/22  0738   POCGLU 335* 265* 303* 170*       Blood Sugar Average: Last 72 hrs:  (P) 290 [id number]   Patient is maintained on Lantus 50 units b i d  And scheduled Humalog 20 units t i d  With associated sliding scale  · with hypoglycemia during admission, now with hyperglycemia  · Basal insulin re-initiated starting at 10 units Q 12, will increase to 20 units q 12 given continued hyperglycemia  · Scheduled Humalog 5 units t i d   With meals added  · Sliding scale insulin a c  HS  · Hypoglycemia protocol

## 2022-07-16 ENCOUNTER — APPOINTMENT (INPATIENT)
Dept: DIALYSIS | Facility: HOSPITAL | Age: 62
DRG: 291 | End: 2022-07-16
Payer: COMMERCIAL

## 2022-07-16 PROBLEM — N18.5 TYPE 2 DIABETES MELLITUS WITH STAGE 5 CHRONIC KIDNEY DISEASE NOT ON CHRONIC DIALYSIS, WITH LONG-TERM CURRENT USE OF INSULIN (HCC): Status: ACTIVE | Noted: 2020-09-02

## 2022-07-16 PROBLEM — N18.6 ESRD (END STAGE RENAL DISEASE) (HCC): Status: ACTIVE | Noted: 2021-06-04

## 2022-07-16 LAB
25(OH)D3 SERPL-MCNC: 21.1 NG/ML (ref 30–100)
ANION GAP SERPL CALCULATED.3IONS-SCNC: 10 MMOL/L (ref 4–13)
BUN SERPL-MCNC: 88 MG/DL (ref 5–25)
CALCIUM SERPL-MCNC: 8.4 MG/DL (ref 8.3–10.1)
CHLORIDE SERPL-SCNC: 103 MMOL/L (ref 100–108)
CO2 SERPL-SCNC: 29 MMOL/L (ref 21–32)
CREAT SERPL-MCNC: 3.86 MG/DL (ref 0.6–1.3)
ERYTHROCYTE [DISTWIDTH] IN BLOOD BY AUTOMATED COUNT: 15.3 % (ref 11.6–15.1)
FERRITIN SERPL-MCNC: 59 NG/ML (ref 8–388)
GFR SERPL CREATININE-BSD FRML MDRD: 12 ML/MIN/1.73SQ M
GLUCOSE SERPL-MCNC: 146 MG/DL (ref 65–140)
GLUCOSE SERPL-MCNC: 202 MG/DL (ref 65–140)
GLUCOSE SERPL-MCNC: 232 MG/DL (ref 65–140)
GLUCOSE SERPL-MCNC: 241 MG/DL (ref 65–140)
GLUCOSE SERPL-MCNC: 279 MG/DL (ref 65–140)
HBV CORE AB SER QL: NORMAL
HBV CORE IGM SER QL: NORMAL
HBV SURFACE AB SER-ACNC: <3.1 MIU/ML
HBV SURFACE AG SER QL: NORMAL
HCT VFR BLD AUTO: 26.7 % (ref 34.8–46.1)
HCV AB SER QL: NORMAL
HGB BLD-MCNC: 8.5 G/DL (ref 11.5–15.4)
IRON SATN MFR SERPL: 17 % (ref 15–50)
IRON SERPL-MCNC: 40 UG/DL (ref 50–170)
MCH RBC QN AUTO: 32.1 PG (ref 26.8–34.3)
MCHC RBC AUTO-ENTMCNC: 31.8 G/DL (ref 31.4–37.4)
MCV RBC AUTO: 101 FL (ref 82–98)
PLATELET # BLD AUTO: 211 THOUSANDS/UL (ref 149–390)
PMV BLD AUTO: 10.1 FL (ref 8.9–12.7)
POTASSIUM SERPL-SCNC: 3.5 MMOL/L (ref 3.5–5.3)
PTH-INTACT SERPL-MCNC: 120.2 PG/ML (ref 18.4–80.1)
RBC # BLD AUTO: 2.65 MILLION/UL (ref 3.81–5.12)
SODIUM SERPL-SCNC: 142 MMOL/L (ref 136–145)
TIBC SERPL-MCNC: 240 UG/DL (ref 250–450)
WBC # BLD AUTO: 6.61 THOUSAND/UL (ref 4.31–10.16)

## 2022-07-16 PROCEDURE — 82948 REAGENT STRIP/BLOOD GLUCOSE: CPT

## 2022-07-16 PROCEDURE — 90935 HEMODIALYSIS ONE EVALUATION: CPT | Performed by: INTERNAL MEDICINE

## 2022-07-16 PROCEDURE — 86705 HEP B CORE ANTIBODY IGM: CPT | Performed by: INTERNAL MEDICINE

## 2022-07-16 PROCEDURE — 99232 SBSQ HOSP IP/OBS MODERATE 35: CPT | Performed by: INTERNAL MEDICINE

## 2022-07-16 PROCEDURE — 83540 ASSAY OF IRON: CPT | Performed by: INTERNAL MEDICINE

## 2022-07-16 PROCEDURE — 86706 HEP B SURFACE ANTIBODY: CPT | Performed by: INTERNAL MEDICINE

## 2022-07-16 PROCEDURE — 86704 HEP B CORE ANTIBODY TOTAL: CPT | Performed by: INTERNAL MEDICINE

## 2022-07-16 PROCEDURE — 85027 COMPLETE CBC AUTOMATED: CPT | Performed by: PHYSICIAN ASSISTANT

## 2022-07-16 PROCEDURE — 80048 BASIC METABOLIC PNL TOTAL CA: CPT | Performed by: PHYSICIAN ASSISTANT

## 2022-07-16 PROCEDURE — 86803 HEPATITIS C AB TEST: CPT | Performed by: INTERNAL MEDICINE

## 2022-07-16 PROCEDURE — 82728 ASSAY OF FERRITIN: CPT | Performed by: INTERNAL MEDICINE

## 2022-07-16 PROCEDURE — 5A1D70Z PERFORMANCE OF URINARY FILTRATION, INTERMITTENT, LESS THAN 6 HOURS PER DAY: ICD-10-PCS | Performed by: INTERNAL MEDICINE

## 2022-07-16 PROCEDURE — 82306 VITAMIN D 25 HYDROXY: CPT | Performed by: INTERNAL MEDICINE

## 2022-07-16 PROCEDURE — 83970 ASSAY OF PARATHORMONE: CPT | Performed by: INTERNAL MEDICINE

## 2022-07-16 PROCEDURE — 83550 IRON BINDING TEST: CPT | Performed by: INTERNAL MEDICINE

## 2022-07-16 PROCEDURE — 87340 HEPATITIS B SURFACE AG IA: CPT | Performed by: INTERNAL MEDICINE

## 2022-07-16 RX ORDER — ISOSORBIDE MONONITRATE 30 MG/1
30 TABLET, EXTENDED RELEASE ORAL EVERY EVENING
Status: DISCONTINUED | OUTPATIENT
Start: 2022-07-17 | End: 2022-07-19 | Stop reason: HOSPADM

## 2022-07-16 RX ORDER — DIPHENHYDRAMINE HCL 25 MG
25 TABLET ORAL EVERY 6 HOURS PRN
Status: DISCONTINUED | OUTPATIENT
Start: 2022-07-16 | End: 2022-07-17

## 2022-07-16 RX ORDER — AMLODIPINE BESYLATE 2.5 MG/1
2.5 TABLET ORAL 2 TIMES DAILY
Status: DISCONTINUED | OUTPATIENT
Start: 2022-07-17 | End: 2022-07-19 | Stop reason: HOSPADM

## 2022-07-16 RX ORDER — INSULIN GLARGINE 100 [IU]/ML
40 INJECTION, SOLUTION SUBCUTANEOUS
Status: DISCONTINUED | OUTPATIENT
Start: 2022-07-16 | End: 2022-07-19 | Stop reason: HOSPADM

## 2022-07-16 RX ADMIN — AMLODIPINE BESYLATE 10 MG: 10 TABLET ORAL at 12:39

## 2022-07-16 RX ADMIN — BISOPROLOL FUMARATE 2.5 MG: 5 TABLET ORAL at 12:40

## 2022-07-16 RX ADMIN — HEPARIN SODIUM 5000 UNITS: 5000 INJECTION INTRAVENOUS; SUBCUTANEOUS at 21:28

## 2022-07-16 RX ADMIN — HEPARIN SODIUM 5000 UNITS: 5000 INJECTION INTRAVENOUS; SUBCUTANEOUS at 13:50

## 2022-07-16 RX ADMIN — NYSTATIN: 100000 POWDER TOPICAL at 16:53

## 2022-07-16 RX ADMIN — GABAPENTIN 400 MG: 400 CAPSULE ORAL at 08:21

## 2022-07-16 RX ADMIN — NYSTATIN: 100000 POWDER TOPICAL at 08:20

## 2022-07-16 RX ADMIN — INSULIN LISPRO 4 UNITS: 100 INJECTION, SOLUTION INTRAVENOUS; SUBCUTANEOUS at 12:40

## 2022-07-16 RX ADMIN — INSULIN LISPRO 5 UNITS: 100 INJECTION, SOLUTION INTRAVENOUS; SUBCUTANEOUS at 12:40

## 2022-07-16 RX ADMIN — INSULIN LISPRO 5 UNITS: 100 INJECTION, SOLUTION INTRAVENOUS; SUBCUTANEOUS at 08:20

## 2022-07-16 RX ADMIN — INSULIN GLARGINE 40 UNITS: 100 INJECTION, SOLUTION SUBCUTANEOUS at 21:29

## 2022-07-16 RX ADMIN — HYDROCODONE BITARTRATE AND ACETAMINOPHEN 1 TABLET: 5; 325 TABLET ORAL at 12:39

## 2022-07-16 RX ADMIN — PANTOPRAZOLE SODIUM 40 MG: 40 TABLET, DELAYED RELEASE ORAL at 16:52

## 2022-07-16 RX ADMIN — ACETAMINOPHEN 650 MG: 325 TABLET, FILM COATED ORAL at 04:10

## 2022-07-16 RX ADMIN — HYDROCODONE BITARTRATE AND ACETAMINOPHEN 1 TABLET: 5; 325 TABLET ORAL at 21:29

## 2022-07-16 RX ADMIN — INSULIN LISPRO 5 UNITS: 100 INJECTION, SOLUTION INTRAVENOUS; SUBCUTANEOUS at 16:52

## 2022-07-16 RX ADMIN — INSULIN GLARGINE 20 UNITS: 100 INJECTION, SOLUTION SUBCUTANEOUS at 08:21

## 2022-07-16 RX ADMIN — CLOPIDOGREL BISULFATE 75 MG: 75 TABLET ORAL at 08:21

## 2022-07-16 RX ADMIN — HEPARIN SODIUM 5000 UNITS: 5000 INJECTION INTRAVENOUS; SUBCUTANEOUS at 05:54

## 2022-07-16 RX ADMIN — INSULIN LISPRO 4 UNITS: 100 INJECTION, SOLUTION INTRAVENOUS; SUBCUTANEOUS at 08:21

## 2022-07-16 RX ADMIN — LIDOCAINE 1 PATCH: 50 PATCH CUTANEOUS at 08:21

## 2022-07-16 RX ADMIN — TORSEMIDE 40 MG: 20 TABLET ORAL at 12:39

## 2022-07-16 RX ADMIN — INSULIN LISPRO 6 UNITS: 100 INJECTION, SOLUTION INTRAVENOUS; SUBCUTANEOUS at 21:29

## 2022-07-16 RX ADMIN — ATORVASTATIN CALCIUM 40 MG: 40 TABLET, FILM COATED ORAL at 08:21

## 2022-07-16 RX ADMIN — ASPIRIN 81 MG: 81 TABLET, COATED ORAL at 08:21

## 2022-07-16 RX ADMIN — TORSEMIDE 40 MG: 20 TABLET ORAL at 21:29

## 2022-07-16 RX ADMIN — SENNOSIDES 8.6 MG: 8.6 TABLET, FILM COATED ORAL at 08:21

## 2022-07-16 RX ADMIN — DIPHENHYDRAMINE HCL 25 MG: 25 TABLET, COATED ORAL at 23:43

## 2022-07-16 RX ADMIN — PANTOPRAZOLE SODIUM 40 MG: 40 TABLET, DELAYED RELEASE ORAL at 05:53

## 2022-07-16 RX ADMIN — OLANZAPINE 5 MG: 5 TABLET, FILM COATED ORAL at 21:29

## 2022-07-16 RX ADMIN — LEVOTHYROXINE SODIUM 50 MCG: 50 TABLET ORAL at 05:54

## 2022-07-16 RX ADMIN — MELATONIN TAB 3 MG 6 MG: 3 TAB at 21:28

## 2022-07-16 RX ADMIN — CITALOPRAM HYDROBROMIDE 40 MG: 20 TABLET ORAL at 08:21

## 2022-07-16 RX ADMIN — ISOSORBIDE MONONITRATE 60 MG: 60 TABLET, EXTENDED RELEASE ORAL at 12:39

## 2022-07-16 RX ADMIN — HYDROCODONE BITARTRATE AND ACETAMINOPHEN 1 TABLET: 5; 325 TABLET ORAL at 06:05

## 2022-07-16 RX ADMIN — ALLOPURINOL 300 MG: 100 TABLET ORAL at 08:21

## 2022-07-16 NOTE — ASSESSMENT & PLAN NOTE
· Chronic combined CHF with worsening renal function now on hemodialysis  · Continue volume control via HD and torsemide 40 mg b i d   · Continue bisoprolol 2 5 mg daily    · Close follow-up with Cardiology after discharge

## 2022-07-16 NOTE — ASSESSMENT & PLAN NOTE
Lab Results   Component Value Date    HGBA1C 7 3 (A) 03/29/2022     · A1c is acceptable but inpatient blood sugars are not controlled with fasting and prandial hyperglycemia  · Simplify Lantus dosing to once daily, from 20 b i d   To 40 daily  · Continue Humalog 5 units with meals and sliding scale  · Diabetic diet

## 2022-07-16 NOTE — ASSESSMENT & PLAN NOTE
· Known history of cardiomyopathy with EF 30%  · Cardiology recommendations reviewed  · Possible life vest placement  · Continue beta-blocker

## 2022-07-16 NOTE — PROGRESS NOTES
[address]  Progress Note - Jazmyn Obrien 1960, 63 y o  female MRN: [id number]  Unit/Bed#: [place] [address] [address] 206-01 Encounter: [id number]  Primary Care Provider: CHERELLE Watson   Date and time admitted to hospital: 7/6/2022  4:59 PM    * 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 heart failure ([facility] )  Assessment & Plan  · Chronic combined CHF with worsening renal function now on hemodialysis  · Continue volume control via HD and torsemide 40 mg b i d   · Continue bisoprolol 2 5 mg daily  · Close follow-up with Cardiology after discharge      ESRD (end stage renal disease) ([place] [id number] )  Assessment & Plan  · With progression of renal disease now deemed to be end-stage  · Status post PermCath placement on 07/15/2022  · Tolerated 1st day of hemodialysis today  · Will need outpatient hemodialysis chair set up  CAD (coronary artery disease)  Assessment & Plan  · Known history of CAD with previous stenting in 2012  · Status post recent cardiac catheterization in July 1, 2022  · Continue aspirin, Plavix, bisoprolol 2 5 mg daily, and Imdur 60 mg daily Lipitor 40 mg daily    · Status post stenting in 2012, then revised in 2017    NSVT (nonsustained ventricular tachycardia) ([facility])  Assessment & Plan  · Known history of cardiomyopathy with EF 30%  · Cardiology recommendations reviewed  · Possible life vest placement  · Continue beta-blocker    Type 2 diabetes mellitus with stage 5 chronic kidney disease not on chronic dialysis, with long-term current use of insulin ([place] [id number] )  Assessment & Plan  Lab Results   Component Value Date    HGBA1C 7 3 (A) 03/29/2022     · A1c is acceptable but inpatient blood sugars are not controlled with fasting and prandial hyperglycemia  · Simplify Lantus dosing to once daily, from 20 b i d  To 40 daily  · Continue Humalog 5 units with meals and sliding scale  · Diabetic diet    Anemia  Assessment & Plan  Anemia secondary to renal disease    Monitor and transfuse as needed  Defer EPO administration/infusion to nephrology    Acquired hypothyroidism  Assessment & Plan  · Continue levothyroxine 50 mcg daily    Mixed hyperlipidemia  Assessment & Plan  · Continue Lipitor 40 mg daily         VTE Pharmacologic Prophylaxis:   Pharmacologic: Heparin  Mechanical VTE Prophylaxis in Place: No    Patient Centered Rounds: I have performed bedside rounds with nursing staff today  Discussions with Specialists or Other Care Team Provider:  Hospital course reviewed    Time Spent for Care: 20 minutes  More than 50% of total time spent on counseling and coordination of care as described above  Current Length of Stay: 10 day(s)    Current Patient Status: Inpatient   Certification Statement: The patient will continue to require additional inpatient hospital stay due to ESRD    Discharge Plan:  DC next week once outpatient hemodialysis chair is set up    Code Status: Level 1 - Full Code      Subjective: Tolerated hemodialysis today  No shortness of breath  She did have mild headache during the procedure  After the procedure she felt much better and felt mentally clear    Objective:     Vitals:   Temp (24hrs), Av 8 °F (36 6 °C), Min:97 5 °F (36 4 °C), Max:98 1 °F (36 7 °C)    Temp:  [97 5 °F (36 4 °C)-98 1 °F (36 7 °C)] 98 °F (36 7 °C)  HR:  [54-73] 61  Resp:  [0-20] 18  BP: (127-156)/(53-71) 135/56  SpO2:  [93 %-98 %] 98 %  Body mass index is 41 06 kg/m²  Input and Output Summary (last 24 hours): Intake/Output Summary (Last 24 hours) at 2022 1434  Last data filed at 2022 1301  Gross per 24 hour   Intake 1460 ml   Output 3758 ml   Net -2298 ml       Physical Exam:     Physical Exam  Constitutional:       Appearance: She is obese  She is not ill-appearing or diaphoretic  HENT:      Head: Normocephalic and atraumatic  Nose: No rhinorrhea  Eyes:      General: No scleral icterus  Cardiovascular:      Rate and Rhythm: Regular rhythm        Heart sounds: No murmur heard  No gallop  Pulmonary:      Effort: Pulmonary effort is normal  No respiratory distress  Breath sounds: No wheezing or rales  Abdominal:      General: Abdomen is flat  Palpations: Abdomen is soft  Musculoskeletal:      Cervical back: Neck supple  Right lower leg: No edema  Left lower leg: No edema  Skin:     General: Skin is warm and dry  Neurological:      Mental Status: She is alert and oriented to person, place, and time  Psychiatric:         Mood and Affect: Mood normal          Behavior: Behavior normal      Additional Data:     Labs:    Results from last 7 days   Lab Units 07/16/22  0500 07/14/22  0504 07/13/22  0517   WBC Thousand/uL 6 61   < > 6 49   HEMOGLOBIN g/dL 8 5*   < > 8 2*   HEMATOCRIT % 26 7*   < > 25 6*   PLATELETS Thousands/uL 211   < > 228   NEUTROS PCT %  --   --  63   LYMPHS PCT %  --   --  23   MONOS PCT %  --   --  7   EOS PCT %  --   --  6    < > = values in this interval not displayed  Results from last 7 days   Lab Units 07/16/22  0500   SODIUM mmol/L 142   POTASSIUM mmol/L 3 5   CHLORIDE mmol/L 103   CO2 mmol/L 29   BUN mg/dL 88*   CREATININE mg/dL 3 86*   ANION GAP mmol/L 10   CALCIUM mg/dL 8 4   GLUCOSE RANDOM mg/dL 202*         Results from last 7 days   Lab Units 07/16/22  1150 07/16/22  0734 07/15/22  2130 07/15/22  1549 07/15/22  1059 07/15/22  0738 07/14/22  2356 07/14/22  2045 07/14/22  1607 07/14/22  1129 07/14/22  0721 07/13/22  2105   POC GLUCOSE mg/dl 241* 232* 219* 232* 206* 170* 303* 265* 335* 360* 218* 258*                   * I Have Reviewed All Lab Data Listed Above  * Additional Pertinent Lab Tests Reviewed:  All Labs Within Last 24 Hours Reviewed    Imaging:    Imaging Reports Reviewed Today Include:  None    Recent Cultures (last 7 days):           Last 24 Hours Medication List:   Current Facility-Administered Medications   Medication Dose Route Frequency Provider Last Rate    acetaminophen  650 mg Oral Q6H PRN Nanda Mantel, PA-C      allopurinol  300 mg Oral Daily [facility]anda [facility], PA-C      amLODIPine  10 mg Oral Daily Miladis Low MD      aspirin  81 mg Oral Daily Nanda [facility], PA-C      atorvastatin  40 mg Oral Daily Nanda [facility], [place]      bisoprolol  2 5 mg Oral Daily Bere Flynn MD      citalopram  40 mg Oral Daily Nanda [facility], PA-C      clopidogrel  75 mg Oral Daily [place], [place]      fentanyl citrate (PF)   Intravenous Code/Trauma/Sedation Med Jodi Monroe MD      gabapentin  400 mg Oral Daily Nanda [facility], S[facility]      heparin (porcine)  5,000 Units Subcutaneous [place], [place]      HYDROcodone-acetaminophen  1 tablet Oral TID PRN Nanda Mantel, SETH      insulin glargine  40 Units Subcutaneous HS Miladis Morgan MD      insulin lispro  2-12 Units Subcutaneous HS Nanda Mikal, SETH      insulin lispro  2-12 Units Subcutaneous TID AC Bere Flynn MD      insulin lispro  5 Units Subcutaneous TID With Meals Nanda Mikal, PA-C      isosorbide mononitrate  60 mg Oral Daily Miladis Low MD      levothyroxine  50 mcg Oral Early Morning [facility], PA-C      lidocaine  1 patch Topical Daily [place], [place]      melatonin  6 mg Oral HS Debi Marrufo Mc, SETH      midazolam    Code/Trauma/Sedation Med Jodi Monroe MD      nitroglycerin  0 4 mg Sublingual Q5 Min PRN Nanda [facility], PA-C      nystatin   Topical BID [place], [place]      OLANZapine  5 mg Oral HS Nanda Mantel, PA-C      ondansetron  4 mg Intravenous Q6H PRN Nanda Mantel, PA-C      pantoprazole  40 mg Oral BID AC [facility], PA-C      senna  1 tablet Oral Daily [place], [place]      tiZANidine  4 mg Oral Q8H PRN Nanda Mantel, PA-C      torsemide  40 mg Oral BID Bandar Self MD          Today, Patient Was Seen By: Miladis Morgan MD    ** Please Note: Dictation voice to text software may have been used in the creation of this document   **

## 2022-07-16 NOTE — ASSESSMENT & PLAN NOTE
· Known history of CAD with previous stenting in 2012  · Status post recent cardiac catheterization in July 1, 2022  · Continue aspirin, Plavix, bisoprolol 2 5 mg daily, and Imdur 60 mg daily Lipitor 40 mg daily    · Status post stenting in 2012, then revised in 2017

## 2022-07-16 NOTE — ASSESSMENT & PLAN NOTE
· With progression of renal disease now deemed to be end-stage  · Status post PermCath placement on 07/15/2022  · Tolerated 1st day of hemodialysis today  · Will need outpatient hemodialysis chair set up

## 2022-07-16 NOTE — PROGRESS NOTES
Progress Note - Cardiology   Magdalena Baig 63 y o  female MRN: [id number]  Unit/Bed#: [place] 2 -01 Encounter: [id number]    Asessment:  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 heart failure  Ischemic cardiomyopathy              - LVEF 30%, moderate LVH, mild LA dilatation, AV sclerosis, trace AR, mild MR, MV sclerosis, mild TR, June 2022  Coronary artery disease with multiple PCIs in the past              - NSTEMI s/p JANET-mLCx w/ small 95% pRCA and diffuse moderate disease of LAD, July 2022               - S/P -pRCA, August 2021               - S/P JANET-mLAD, JANET-D1 and JANET-LCx, December 2012  Acute kidney injury on chronic kidney disease stage IV with progression to ESRD  Nonsustained ventricular tachycardia, 8 beat run via telemetry  Hypertension  Dyslipidemia              - lipid panel 6/24/22: Cholesterol 120, triglycerides 138, HDL 42, LDL 50  Morbid obesity  Anemia of chronic kidney disease  Type 2 diabetes mellitus  Polycystic ovarian syndrome  Neurogenic bladder with suprapubic catheter in place  Obstructive sleep apnea, not on CPAP  Hx syncope with orthostatic hypotension  Hypothyroid     Plan/ Discussion:  · S/P PermCath placement with initiation of hemodialysis today per Nephrology  Continues on torsemide 40 mg twice daily as well  Will defer diuretic management and adjustment to the Nephrology team as patient is now receiving dialysis  · Currently on amlodipine 10 mg daily, bisoprolol 2 5 mg daily, isosorbide mononitrate 60 mg daily, Plavix 75 mg daily, aspirin 81 mg daily, atorvastatin 40 mg daily  To consider GDMT for underlying cardiomyopathy as patient is now on hemodialysis  Will monitor blood pressure response to dialysis for medication adjustments  · To consider Lifevest prior to discharge; will review this with the patient  · Monitor volume status closely with strict intake/output, daily weight    · Recommend to maintain potassium > 4, magnesium > 2      Subjective:  Patient seen and examined at the bedside  She was receiving her first dialysis treatment without issue  She denies chest pain, shortness of breath       Vitals:  Vitals:    07/16/22 0508 07/16/22 0600   Weight: 123 kg (270 lb 1 oz) 123 kg (270 lb 1 oz)   ,  Vitals:    07/16/22 0736 07/16/22 0920 07/16/22 0930 07/16/22 1000   BP: 144/64 142/61 140/59 131/54   BP Location: Left arm      Pulse: 64 64 64 64   Resp: 20 20 20 20   Temp: 97 8 °F (36 6 °C) 98 1 °F (36 7 °C)     TempSrc: Oral      SpO2: 95%      Weight:       Height:           Exam:  General: alert awake and oriented, no acute distress  Heart: regular rate, regular rhythm, S1, S2   Respiratory effort/ Lungs: breathing comfortably on room air, diminished lung sounds bilateral bases   Abdominal: obese, rounded, normoactive bowel sounds  Lower Limbs: + bilateral lower extremity edema              Medications:    Current Facility-Administered Medications:     acetaminophen (TYLENOL) tablet 650 mg, 650 mg, Oral, Q6H PRN, Aurelio Rodriguez PA-C, 650 mg at 07/16/22 0410    allopurinol (ZYLOPRIM) tablet 300 mg, 300 mg, Oral, Daily, Aurelio Rodriguez PA-C, 300 mg at 07/16/22 2644    amLODIPine (NORVASC) tablet 10 mg, 10 mg, Oral, Daily, Claudio Schmidt MD, 10 mg at 07/15/22 3334    aspirin (ECOTRIN LOW STRENGTH) EC tablet 81 mg, 81 mg, Oral, Daily, Aurelio Rodriguez PA-C, 81 mg at 07/16/22 2010    atorvastatin (LIPITOR) tablet 40 mg, 40 mg, Oral, Daily, Aurelio Rodriguez PA-C, 40 mg at 07/16/22 8389    bisoprolol (ZEBETA) tablet 2 5 mg, 2 5 mg, Oral, Daily, Bere Mejia MD, 2 5 mg at 07/15/22 0938    citalopram (CeleXA) tablet 40 mg, 40 mg, Oral, Daily, Aurelio Rodriguez PA-C, 40 mg at 07/16/22 7229    clopidogrel (PLAVIX) tablet 75 mg, 75 mg, Oral, Daily, Aurelio Rodriguez PA-C, 75 mg at 07/16/22 6871    fentanyl citrate (PF) 100 MCG/2ML, , Intravenous, Code/Trauma/Sedation Med, Malini Chavez MD, 50 mcg at 07/15/22 3462    gabapentin (NEURONTIN) capsule 400 mg, 400 mg, Oral, Daily, Maico Senate, PA-C, 400 mg at 07/16/22 9616    heparin (porcine) subcutaneous injection 5,000 Units, 5,000 Units, Subcutaneous, Q8H [facility], 5,000 Units at 07/16/22 0554 **AND** [CANCELED] Platelet count, , , Once, Maico Senate, PA-C    HYDROcodone-acetaminophen [facility]) 5-325 mg per tablet 1 tablet, 1 tablet, Oral, TID PRN, Maico Olivaate, PA-C, 1 tablet at 07/16/22 0605    insulin glargine (LANTUS) subcutaneous injection 20 Units 0 2 mL, 20 Units, Subcutaneous, Q12H [facility], Maico Watt PA-C, 20 Units at 07/16/22 7618    insulin lispro (HumaLOG) 100 units/mL subcutaneous injection 2-12 Units, 2-12 Units, Subcutaneous, HS, Maico Watt, PA-C, 4 Units at 07/15/22 2159    insulin lispro (HumaLOG) 100 units/mL subcutaneous injection 2-12 Units, 2-12 Units, Subcutaneous, TID AC, 4 Units at 07/16/22 0821 **AND** Fingerstick Glucose (POCT), , , 4x Daily AC and at bedtime, Ankit Calderon MD    insulin lispro (HumaLOG) 100 units/mL subcutaneous injection 5 Units, 5 Units, Subcutaneous, TID With Meals, Maico Watt PA-C, 5 Units at 07/16/22 0820    isosorbide mononitrate (IMDUR) 24 hr tablet 60 mg, 60 mg, Oral, Daily, Claudio Schmidt MD, 60 mg at 07/15/22 4113    levothyroxine tablet 50 mcg, 50 mcg, Oral, Early Morning, Maico Olivaate, PA-C, 50 mcg at 07/16/22 0554    lidocaine (LIDODERM) 5 % patch 1 patch, 1 patch, Topical, Daily, Maico Olivaate, PA-C, 1 patch at 07/16/22 6295    melatonin tablet 6 mg, 6 mg, Oral, HS, LANRE Pardo-C, 6 mg at 07/15/22 2156    midazolam (VERSED) injection, , , Code/Trauma/Sedation Med, Js Ohara MD, 1 mg at 07/15/22 1459    nitroglycerin (NITROSTAT) SL tablet 0 4 mg, 0 4 mg, Sublingual, Q5 Min PRN, Dirk Senate, PA-C, 0 4 mg at 07/09/22 1930    nystatin (MYCOSTATIN) powder, , Topical, BID, Dirk Senate, PA-C, Given at 07/16/22 0820    OLANZapine (ZyPREXA) tablet 5 mg, 5 mg, Oral, HS, Dirk Senate, PA-C, 5 mg at 07/15/22 2156    [facility]ron [facility]) injection 4 mg, 4 mg, Intravenous, Q6H PRN, Ravi Merck, PA-C    pantoprazole (PROTONIX) EC tablet 40 mg, 40 mg, Oral, BID AC, Ravi Merck, PA-C, 40 mg at 07/16/22 0553    senna (SENOKOT) tablet 8 6 mg, 1 tablet, Oral, Daily, Ravi Merck, PA-C, 8 6 mg at 07/16/22 8953    tiZANidine (ZANAFLEX) tablet 4 mg, 4 mg, Oral, Q8H PRN, Ravi Merck, PA-C, 4 mg at 07/13/22 0045    torsemide (DEMADEX) tablet 40 mg, 40 mg, Oral, BID, Laura Manzanares MD, 40 mg at 07/15/22 1087      Labs/Data:        Results from last 7 days   Lab Units 07/16/22  0500 07/15/22  0532 07/14/22  0504   WBC Thousand/uL 6 61 5 96 6 47   HEMOGLOBIN g/dL 8 5* 8 3* 8 9*   HEMATOCRIT % 26 7* 26 7* 28 0*   PLATELETS Thousands/uL 211 216 236     Results from last 7 days   Lab Units 07/16/22  0500 07/15/22  0532 07/14/22  0504   POTASSIUM mmol/L 3 5 3 6 3 9   CHLORIDE mmol/L 103 104 103   CO2 mmol/L 29 28 29   BUN mg/dL 88* 90* 90*

## 2022-07-16 NOTE — ASSESSMENT & PLAN NOTE
Anemia secondary to renal disease  Monitor and transfuse as needed    Defer EPO administration/infusion to nephrology

## 2022-07-16 NOTE — PLAN OF CARE
Pre-tx:  UF 0 5L per order  Today is pt's first HD tx, Nephrology LANRE Rivera at bedside during tx initiation  Pt's BP stable to start tx at 142/61  Will cont to monitor  Post-Dialysis RN Treatment Note    Blood Pressure:  Pre 142/61 mm/Hg  Post 135/56 mmHg   EDW  TBD kg    Weight:  Pre 123 9 kg   Post 123 4  kg   Mode of weight measurement: Bed Scale   Volume Removed  500 ml    Treatment duration 2 5 hours   NS given  No    Treatment shortened?  No   Medications given during Rx Not Applicable   Estimated Kt/V  Not Applicable   Access type: Permacath/TDC   Access Issues: Lines reversed x 1 per protocol   Report called to primary nurse   Yes             Problem: METABOLIC, FLUID AND ELECTROLYTES - ADULT  Goal: Electrolytes maintained within normal limits  Description: INTERVENTIONS:  - Monitor labs and assess patient for signs and symptoms of electrolyte imbalances  - Administer electrolyte replacement as ordered  - Monitor response to electrolyte replacements, including repeat lab results as appropriate  - Instruct patient on fluid and nutrition as appropriate  Outcome: Progressing  Goal: Fluid balance maintained  Description: INTERVENTIONS:  - Monitor labs   - Monitor I/O and WT  - Instruct patient on fluid and nutrition as appropriate  - Assess for signs & symptoms of volume excess or deficit  Outcome: Progressing

## 2022-07-16 NOTE — CASE MANAGEMENT
Case Management Progress Note    Patient name Cooper Lion  [facility] 2 Becky Lerner* MRN [id number]  : 1960 Date 2022       LOS (days): 10  Geometric Mean LOS (GMLOS) (days): 3 80  Days to GMLOS:-6 1        OBJECTIVE:        Current admission status: Inpatient  Preferred Pharmacy:   [address] [place] [address], [facility] [address] [facility] [facility]  [address] [place] [address]  Phone: [phone] Fax: [phone]    Primary Care Provider: CHERELLE Zee    Primary Insurance: [facility]  Secondary Insurance: [id number] [facility]    PROGRESS NOTE: Dialysis Intake documents fax attached to [address] [place] [address] Drive

## 2022-07-16 NOTE — PROGRESS NOTES
NEPHROLOGY PROGRESS NOTE   Cooper Lion 63 y o  female MRN: [id number]  Unit/Bed#: [place] [address] 2 [address] 206-01 Encounter: [id number]  Reason for Consult: ALFRED, POA on CKD IV    HEMODIALYSIS PROCEDURE NOTE  The patient was seen and examined on hemodialysis  Patient tolerating procedure well  I was present bedside for the 1st 15 minutes of treatment today  No seizure activity or evidence of allergic reaction  Time:  2 5 hours  Sodium:  130 Blood flow:  250   Dialyzer: F160 Potassium:  4 Dialysate flow:  500   Access:  R IJV PermCath Bicarbonate:  35 Ultrafiltration goal:  500   Medications on HD:  none         65-year-old female with uncontrolled DM 2, CKD 4, CHF, CAD, chronic suprapubic catheter presents with worsening dyspnea  Nephrology consult to for management of ALFRED    ASSESSMENT/PLAN:  ALFRED (POA):  Suspect   -Admission creatinine 2 94 which continue to increase  Today's creatinine 3 86  -Peak creatinine 3 86  -baseline creatinine 2 2-2 9  -history of multiple episodes of ALFRDE  -Imaging: Renal ultrasound 6/23/2022 with normal echogenicity and contour or, no hydronephrosis  -nonoliguric   UO 2800 ml/24 hrs  -Plan:   New start hemodialysis today  Patient seen during hemodialysis  Tolerating well   Next hemodialysis treatment 7/18/2022   Strict I/Os, daily weights   Avoid nephrotoxins, NSAIDs, IV contrast if possible   Avoid hypotension    Maintain MAP >65   Trend BMP   Adjust meds to appropriate GFR   Optimize hemodynamic status to avoid delay in renal recovery    Chronic kidney disease IV with progression to ESRD:    -Etiology:  Suspect related to diabetic nephropathy, hypertensive nephrosclerosis, cardiorenal syndrome, multiple episodes of ALFRED and obesity  -Baseline creatinine 2 2-2 9  -Follows with Dr Audie Cain    Hypertension/Volume status:  -BP acceptable  -clinically, examines mildly hypervolemic  -Home medications:  Bisoprolol 2 5 mg daily, amlodipine 10 mg daily, Imdur 60 mg daily,  -Current medications:  Amlodipine 10 mg daily, bisoprolol 2 5 mg daily, Imdur 60 mg daily, torsemide 40 mg b i d   -will continue diuretics for now  -Optimize hemodynamic status to avoid delay in renal recovery  -recommend hold parameters on antihypertensive's for SBP <130 mmHg  -Avoid hypotension or fluctuations in blood pressure  Maintain MAP >65  -continue to trend    Chronic combined diastolic/systolic congestive heart failure:  -EF 45% with grade 2 diastolic dysfunction  -volume status appears mildly hypervolemic  -on beta-blockers and torsemide  -management per primary team    Electrolytes/acid base:  -stable  -will manage through dialysis  -continue to monitor    Anemia of CKD:  -Hgb 8 5  Goal >10  -Iron studies:  Iron 40, ferritin 59, iron saturation 17, TIBC 240  -would benefit from supplementation  -continue to trend  -Transfuse for Hgb <7 0  -management per primary team    Bone Mineral Disease of CKD:  -phosphorus 5 1 on 6/26/22, repeat  -continue to monitor and follow phosphorus, PTH , calcium, magnesium as outpatient    DM II:  -uncontrolled  -HgbA1C 7 3  -continue to optimize blood sugar control  -management per SLIM    SUBJECTIVE:  Patient awake, alert without complaints  Denies dyspnea  Plan for new start hemodialysis today  Patient seen and examined during hemodialysis and bedside for 1st 15 minutes of treatment  Patient tolerating well  VSS    A complete review of systems was performed  Pertinent positives and negatives noted in the HPI  Otherwise the review of systems was negative        OBJECTIVE:  Current Weight: Weight - Scale: 123 kg (270 lb 1 oz)  Vitals:    07/16/22 1100 07/16/22 1130 07/16/22 1200 07/16/22 1448   BP: 130/53 140/55 135/56 110/59   BP Location:       Pulse: 59 59 61 66   Resp: 20 20 18 20   Temp:   98 °F (36 7 °C) 98 4 °F (36 9 °C)   TempSrc:       SpO2: 95% 95% 98% 96%   Weight:       Height:           Intake/Output Summary (Last 24 hours) at 7/16/2022 1454  Last data filed at 7/16/2022 1301  Gross per 24 hour   Intake 1460 ml   Output 3758 ml   Net -2298 ml     General:  Awake, alert, appears comfortable and in no acute distress  Nontoxic  Obese  Skin:  No rash, warm, good skin turgor   Eyes:  PERRL, EOMI, sclerae nonicteric   no periorbital edema   ENT:  Moist mucous membranes  Neck:  Trachea midline, symmetric  No JVD  Chest:  Few bibasilar crackles  CVS:  Regular rate and rhythm without murmur, gallop or rub  S1 and S2 identified and normal   No S3, S4    Abdomen:  Soft, nontender, nondistended without masses  Normal bowel sounds x 4 quadrants  No bruit  Extremities:  Warm, pink, motor and sensory intact and well perfused  No cyanosis, pallor  1-2 + BLE edema  Neuro:  Awake, alert, oriented x3  Grossly intact  Psych:  Appropriate affect  Mentating appropriately  Normal mental status exam  :  Chronic suprapubic catheter in place draining concentrated urine          Medications:    Current Facility-Administered Medications:     acetaminophen (TYLENOL) tablet 650 mg, 650 mg, Oral, Q6H PRN, Dayan Cooley PA-C, 650 mg at 07/16/22 0410    allopurinol (ZYLOPRIM) tablet 300 mg, 300 mg, Oral, Daily, Dayan Cooley, PA-C, 300 mg at 07/16/22 1655    amLODIPine (NORVASC) tablet 10 mg, 10 mg, Oral, Daily, Claudio Schmidt MD, 10 mg at 07/16/22 1239    aspirin (ECOTRIN LOW STRENGTH) EC tablet 81 mg, 81 mg, Oral, Daily, Dayan Cooley PA-C, 81 mg at 07/16/22 5953    atorvastatin (LIPITOR) tablet 40 mg, 40 mg, Oral, Daily, Dayan Cooley PA-C, 40 mg at 07/16/22 4283    bisoprolol (ZEBETA) tablet 2 5 mg, 2 5 mg, Oral, Daily, Bere Mejia MD, 2 5 mg at 07/16/22 1240    citalopram (CeleXA) tablet 40 mg, 40 mg, Oral, Daily, Dayan Cooley PA-C, 40 mg at 07/16/22 8572    clopidogrel (PLAVIX) tablet 75 mg, 75 mg, Oral, Daily, Dayan Cooley PA-C, 75 mg at 07/16/22 1927    fentanyl citrate (PF) 100 MCG/2ML, , Intravenous, Code/Trauma/Sedation Med, Terrance Navarrete MD, 50 mcg at 07/15/22 1459    gabapentin (NEURONTIN) capsule 400 mg, 400 mg, Oral, Daily, Juliette Springfield, PA-C, 400 mg at 07/16/22 1742    heparin (porcine) subcutaneous injection 5,000 Units, 5,000 Units, Subcutaneous, Q8H [facility], 5,000 Units at 07/16/22 1350 **AND** [CANCELED] Platelet count, , , Once, Juliette Springfield, PA-C    HYDROcodone-acetaminophen [facility]) 5-325 mg per tablet 1 tablet, 1 tablet, Oral, TID PRN, Juliette Springfield, PA-C, 1 tablet at 07/16/22 1239    insulin glargine (LANTUS) subcutaneous injection 40 Units 0 4 mL, 40 Units, Subcutaneous, HS, Nataly Gillette MD    insulin lispro (HumaLOG) 100 units/mL subcutaneous injection 2-12 Units, 2-12 Units, Subcutaneous, HS, Juliette Springfield, PA-C, 4 Units at 07/15/22 2159    insulin lispro (HumaLOG) 100 units/mL subcutaneous injection 2-12 Units, 2-12 Units, Subcutaneous, TID AC, 4 Units at 07/16/22 1240 **AND** Fingerstick Glucose (POCT), , , 4x Daily AC and at bedtime, Eden Villanueva MD    insulin lispro (HumaLOG) 100 units/mL subcutaneous injection 5 Units, 5 Units, Subcutaneous, TID With Meals, Juliette Springfield, PA-C, 5 Units at 07/16/22 1240    isosorbide mononitrate (IMDUR) 24 hr tablet 60 mg, 60 mg, Oral, Daily, Claudio Schmidt MD, 60 mg at 07/16/22 1239    levothyroxine tablet 50 mcg, 50 mcg, Oral, Early Morning, Juliette Springfield, PA-C, 50 mcg at 07/16/22 0554    lidocaine (LIDODERM) 5 % patch 1 patch, 1 patch, Topical, Daily, Juliette Springfield, PA-C, 1 patch at 07/16/22 0643    melatonin tablet 6 mg, 6 mg, Oral, HS, Debi House PA-C, 6 mg at 07/15/22 2156    midazolam (VERSED) injection, , , Code/Trauma/Sedation Med, Martin Boss MD, 1 mg at 07/15/22 1459    nitroglycerin (NITROSTAT) SL tablet 0 4 mg, 0 4 mg, Sublingual, Q5 Min PRN, Juliette Springfield, PA-C, 0 4 mg at 07/09/22 1930    nystatin (MYCOSTATIN) powder, , Topical, BID, Juliette Springfield, PA-C, Given at 07/16/22 0820    OLANZapine (ZyPREXA) tablet 5 mg, 5 mg, Oral, HS, Juliette Springfield, PA-C, 5 mg at 07/15/22 2156    ondansetron (ZOFRAN) injection 4 mg, 4 mg, Intravenous, Q6H PRN, Nanda Ren PA-C    pantoprazole (PROTONIX) EC tablet 40 mg, 40 mg, Oral, BID AC, Nanda Ren, PA-C, 40 mg at 07/16/22 0553    senna (SENOKOT) tablet 8 6 mg, 1 tablet, Oral, Daily, Nanda Ren PA-C, 8 6 mg at 07/16/22 1317    tiZANidine (ZANAFLEX) tablet 4 mg, 4 mg, Oral, Q8H PRN, Nanda Ren, PA-C, 4 mg at 07/13/22 0045    torsemide (DEMADEX) tablet 40 mg, 40 mg, Oral, BID, Bandar Self MD, 40 mg at 07/16/22 1239    Laboratory Results:  Results from last 7 days   Lab Units 07/16/22  0500 07/15/22  0532 07/14/22  0504 07/13/22  0517 07/12/22  0524 07/11/22  0557 07/10/22  0449   WBC Thousand/uL 6 61 5 96 6 47 6 49  --  6 04 5 00   HEMOGLOBIN g/dL 8 5* 8 3* 8 9* 8 2*  --  8 3* 9 0*   HEMATOCRIT % 26 7* 26 7* 28 0* 25 6*  --  26 6* 28 7*   PLATELETS Thousands/uL 211 216 236 228  --  259 269   SODIUM mmol/L 142 142 142 141 141 142 143   POTASSIUM mmol/L 3 5 3 6 3 9 3 8 4 1 4 2 4 1   CHLORIDE mmol/L 103 104 103 102 102 103 102   CO2 mmol/L 29 28 29 28 29 28 30   BUN mg/dL 88* 90* 90* 93* 89* 86* 76*   CREATININE mg/dL 3 86* 3 56* 3 52* 3 48* 3 60* 3 25* 3 12*   CALCIUM mg/dL 8 4 8 5 8 5 8 3 8 1* 8 5 8 4       I have personally reviewed the blood work as stated above and in my note  I have personally reviewed internal Medicine, co-consultants and previous nephrology notes

## 2022-07-17 LAB
ANION GAP SERPL CALCULATED.3IONS-SCNC: 11 MMOL/L (ref 4–13)
BUN SERPL-MCNC: 65 MG/DL (ref 5–25)
CALCIUM SERPL-MCNC: 8.1 MG/DL (ref 8.3–10.1)
CHLORIDE SERPL-SCNC: 103 MMOL/L (ref 100–108)
CO2 SERPL-SCNC: 28 MMOL/L (ref 21–32)
CREAT SERPL-MCNC: 3.2 MG/DL (ref 0.6–1.3)
GFR SERPL CREATININE-BSD FRML MDRD: 15 ML/MIN/1.73SQ M
GLUCOSE SERPL-MCNC: 167 MG/DL (ref 65–140)
GLUCOSE SERPL-MCNC: 195 MG/DL (ref 65–140)
GLUCOSE SERPL-MCNC: 208 MG/DL (ref 65–140)
GLUCOSE SERPL-MCNC: 279 MG/DL (ref 65–140)
GLUCOSE SERPL-MCNC: 311 MG/DL (ref 65–140)
PHOSPHATE SERPL-MCNC: 4.1 MG/DL (ref 2.7–4.5)
POTASSIUM SERPL-SCNC: 3.6 MMOL/L (ref 3.5–5.3)
SARS-COV-2 RNA RESP QL NAA+PROBE: NEGATIVE
SODIUM SERPL-SCNC: 142 MMOL/L (ref 136–145)

## 2022-07-17 PROCEDURE — U0003 INFECTIOUS AGENT DETECTION BY NUCLEIC ACID (DNA OR RNA); SEVERE ACUTE RESPIRATORY SYNDROME CORONAVIRUS 2 (SARS-COV-2) (CORONAVIRUS DISEASE [COVID-19]), AMPLIFIED PROBE TECHNIQUE, MAKING USE OF HIGH THROUGHPUT TECHNOLOGIES AS DESCRIBED BY CMS-2020-01-R: HCPCS | Performed by: NURSE PRACTITIONER

## 2022-07-17 PROCEDURE — 80048 BASIC METABOLIC PNL TOTAL CA: CPT | Performed by: INTERNAL MEDICINE

## 2022-07-17 PROCEDURE — 99232 SBSQ HOSP IP/OBS MODERATE 35: CPT | Performed by: INTERNAL MEDICINE

## 2022-07-17 PROCEDURE — 84100 ASSAY OF PHOSPHORUS: CPT | Performed by: PHYSICIAN ASSISTANT

## 2022-07-17 PROCEDURE — U0005 INFEC AGEN DETEC AMPLI PROBE: HCPCS | Performed by: NURSE PRACTITIONER

## 2022-07-17 PROCEDURE — 82948 REAGENT STRIP/BLOOD GLUCOSE: CPT

## 2022-07-17 RX ORDER — LOSARTAN POTASSIUM 25 MG/1
25 TABLET ORAL DAILY
Status: DISCONTINUED | OUTPATIENT
Start: 2022-07-17 | End: 2022-07-19 | Stop reason: HOSPADM

## 2022-07-17 RX ORDER — DIPHENHYDRAMINE HCL 25 MG
50 TABLET ORAL EVERY 6 HOURS PRN
Status: DISCONTINUED | OUTPATIENT
Start: 2022-07-17 | End: 2022-07-19 | Stop reason: HOSPADM

## 2022-07-17 RX ADMIN — INSULIN LISPRO 2 UNITS: 100 INJECTION, SOLUTION INTRAVENOUS; SUBCUTANEOUS at 08:11

## 2022-07-17 RX ADMIN — ISOSORBIDE MONONITRATE 60 MG: 60 TABLET, EXTENDED RELEASE ORAL at 08:11

## 2022-07-17 RX ADMIN — DIPHENHYDRAMINE HCL 50 MG: 25 TABLET, COATED ORAL at 22:08

## 2022-07-17 RX ADMIN — PANTOPRAZOLE SODIUM 40 MG: 40 TABLET, DELAYED RELEASE ORAL at 17:21

## 2022-07-17 RX ADMIN — TORSEMIDE 40 MG: 20 TABLET ORAL at 08:11

## 2022-07-17 RX ADMIN — MELATONIN TAB 3 MG 6 MG: 3 TAB at 22:12

## 2022-07-17 RX ADMIN — CLOPIDOGREL BISULFATE 75 MG: 75 TABLET ORAL at 08:11

## 2022-07-17 RX ADMIN — LIDOCAINE 1 PATCH: 50 PATCH CUTANEOUS at 08:11

## 2022-07-17 RX ADMIN — NYSTATIN: 100000 POWDER TOPICAL at 08:20

## 2022-07-17 RX ADMIN — ALLOPURINOL 300 MG: 100 TABLET ORAL at 08:11

## 2022-07-17 RX ADMIN — INSULIN LISPRO 8 UNITS: 100 INJECTION, SOLUTION INTRAVENOUS; SUBCUTANEOUS at 12:19

## 2022-07-17 RX ADMIN — HEPARIN SODIUM 5000 UNITS: 5000 INJECTION INTRAVENOUS; SUBCUTANEOUS at 22:13

## 2022-07-17 RX ADMIN — ISOSORBIDE MONONITRATE 30 MG: 30 TABLET, EXTENDED RELEASE ORAL at 17:21

## 2022-07-17 RX ADMIN — AMLODIPINE BESYLATE 2.5 MG: 2.5 TABLET ORAL at 22:13

## 2022-07-17 RX ADMIN — NYSTATIN: 100000 POWDER TOPICAL at 17:23

## 2022-07-17 RX ADMIN — OLANZAPINE 5 MG: 5 TABLET, FILM COATED ORAL at 22:08

## 2022-07-17 RX ADMIN — CITALOPRAM HYDROBROMIDE 40 MG: 20 TABLET ORAL at 08:11

## 2022-07-17 RX ADMIN — ASPIRIN 81 MG: 81 TABLET, COATED ORAL at 08:11

## 2022-07-17 RX ADMIN — TORSEMIDE 40 MG: 20 TABLET ORAL at 22:13

## 2022-07-17 RX ADMIN — HYDROCODONE BITARTRATE AND ACETAMINOPHEN 1 TABLET: 5; 325 TABLET ORAL at 12:46

## 2022-07-17 RX ADMIN — INSULIN LISPRO 5 UNITS: 100 INJECTION, SOLUTION INTRAVENOUS; SUBCUTANEOUS at 08:12

## 2022-07-17 RX ADMIN — HYDROCODONE BITARTRATE AND ACETAMINOPHEN 1 TABLET: 5; 325 TABLET ORAL at 22:08

## 2022-07-17 RX ADMIN — HEPARIN SODIUM 5000 UNITS: 5000 INJECTION INTRAVENOUS; SUBCUTANEOUS at 13:56

## 2022-07-17 RX ADMIN — GABAPENTIN 400 MG: 400 CAPSULE ORAL at 08:11

## 2022-07-17 RX ADMIN — INSULIN LISPRO 6 UNITS: 100 INJECTION, SOLUTION INTRAVENOUS; SUBCUTANEOUS at 22:18

## 2022-07-17 RX ADMIN — INSULIN LISPRO 5 UNITS: 100 INJECTION, SOLUTION INTRAVENOUS; SUBCUTANEOUS at 17:21

## 2022-07-17 RX ADMIN — SENNOSIDES 8.6 MG: 8.6 TABLET, FILM COATED ORAL at 08:11

## 2022-07-17 RX ADMIN — BISOPROLOL FUMARATE 2.5 MG: 5 TABLET ORAL at 08:12

## 2022-07-17 RX ADMIN — PANTOPRAZOLE SODIUM 40 MG: 40 TABLET, DELAYED RELEASE ORAL at 06:37

## 2022-07-17 RX ADMIN — LEVOTHYROXINE SODIUM 50 MCG: 50 TABLET ORAL at 06:38

## 2022-07-17 RX ADMIN — AMLODIPINE BESYLATE 2.5 MG: 2.5 TABLET ORAL at 08:11

## 2022-07-17 RX ADMIN — ATORVASTATIN CALCIUM 40 MG: 40 TABLET, FILM COATED ORAL at 08:11

## 2022-07-17 RX ADMIN — INSULIN LISPRO 4 UNITS: 100 INJECTION, SOLUTION INTRAVENOUS; SUBCUTANEOUS at 17:21

## 2022-07-17 RX ADMIN — INSULIN GLARGINE 40 UNITS: 100 INJECTION, SOLUTION SUBCUTANEOUS at 22:15

## 2022-07-17 RX ADMIN — HEPARIN SODIUM 5000 UNITS: 5000 INJECTION INTRAVENOUS; SUBCUTANEOUS at 06:38

## 2022-07-17 RX ADMIN — LOSARTAN POTASSIUM 25 MG: 25 TABLET, FILM COATED ORAL at 12:19

## 2022-07-17 RX ADMIN — INSULIN LISPRO 5 UNITS: 100 INJECTION, SOLUTION INTRAVENOUS; SUBCUTANEOUS at 12:20

## 2022-07-17 NOTE — ASSESSMENT & PLAN NOTE
· Stable  · Known history of CAD with previous stenting in 2012  · Status post recent cardiac catheterization in July 1, 2022  · Continue aspirin, Plavix, bisoprolol 2 5 mg daily, and Imdur 60 mg daily Lipitor 40 mg daily    · Status post stenting in 2012, then revised in 2017

## 2022-07-17 NOTE — ASSESSMENT & PLAN NOTE
· CHF with worsening renal function now on hemodialysis  · Overall improved  · Continue volume control via HD and torsemide 40 mg b i d   · Continue bisoprolol 2 5 mg daily    · Close follow-up with Cardiology after discharge

## 2022-07-17 NOTE — CASE MANAGEMENT
Case Management Discharge Planning Note    Patient name Latoya Alva  Location [place] [address]* MRN [id number]  : 1960 Date 2022       Current Admission Date: 2022  Current Admission Diagnosis: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 heart failure [facility])   Patient Active Problem List    Diagnosis Date Noted    [facility]ust[facility] 20[facility]    Hyponatremia 2022    History of anemia due to chronic kidney disease 2022    Bradycardia 2022    Syncope 2022    Acute renal failure superimposed on chronic kidney disease ([facility] ) 2022    Chest pain 2022    Elevated troponin I level 2022    Hypotension 2022    ALFRED (acute kidney injury) ([facility] ) 2022    Encounter for examination following treatment at hospital 2022    Other artificial openings of urinary tract status ([facility] ) 02/10/2022    Continuous opioid dependence ([facility] ) 02/10/2022    Adrenal nodule ([facility] ) 02/10/2022    Stable angina ([facility] ) 02/10/2022    Volume overload 02/10/2022    Acquired hypothyroidism 10/14/2021    Mixed hyperlipidemia 10/14/2021    Gastroesophageal reflux disease without esophagitis 10/13/2021    Other proteinuria 2021    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 heart failure ([facility] ) 2021    Prolonged QT interval 2021    Urinary tract infection associated with cystostomy catheter ([facility] ) 2021    Neurogenic bladder 2021    Morbid obesity with BMI of 45 0-49 9, adult ([facility] ) 06/15/2021    History of heart artery stent 06/15/2021    ESRD (end stage renal disease) ([facility] ) 2021    Memory impairment 2021    Chronic bronchitis ([facility] ) 2021    Severe episode of recurrent major depressive disorder, without psychotic features ([facility] ) 2021    Skin ulcer of hand, limited to breakdown of skin ([facility] ) 2021    HTN (hypertension) 2020  Diabetic ulcer of toe of right foot associated with type 2 diabetes mellitus, with muscle involvement without evidence of necrosis ([email or web address] ) 11/25/2020    Head lump 11/11/2020    Anemia 09/09/2020    Abnormal LFTs 09/09/2020    S/P cervical spinal fusion 09/09/2020    Major depressive disorder 09/02/2020    Type 2 diabetes mellitus with stage 5 chronic kidney disease not on chronic dialysis, with long-term current use of insulin ([facility] ) 09/02/2020    Anxiety 09/02/2020    CAD (coronary artery disease) 09/02/2020    Osteoarthritis of left knee 09/02/2020    Cellulitis of finger of right hand 08/26/2020      LOS (days): 11  Geometric Mean LOS (GMLOS) (days): 3 80  Days to GMLOS:-7 1     OBJECTIVE:  Risk of Unplanned Readmission Score: 64 57         Current admission status: Inpatient   Preferred Pharmacy:   [address] [place] [address], [address] [place] [address] [address] [place] [address]  Phone: [phone] Fax: [phone]    Primary Care Provider: CHERELLE Scott    Primary Insurance: Shreya Braun [facility]  Secondary Insurance: [id number] [facility]    DISCHARGE DETAILS:     2173 - IMM completed w/ pt  Original placed in scan bin  Copy placed in pt's d/c folder, as pt requested

## 2022-07-17 NOTE — PLAN OF CARE
Problem: PAIN - ADULT  Goal: Verbalizes/displays adequate comfort level or baseline comfort level  Description: Interventions:  - Encourage patient to monitor pain and request assistance  - Assess pain using appropriate pain scale  - Administer analgesics based on type and severity of pain and evaluate response  - Implement non-pharmacological measures as appropriate and evaluate response  - Consider cultural and social influences on pain and pain management  - Notify physician/advanced practitioner if interventions unsuccessful or patient reports new pain  Outcome: Progressing     Problem: SAFETY ADULT  Goal: Patient will remain free of falls  Description: INTERVENTIONS:  - Educate patient/family on patient safety including physical limitations  - Instruct patient to call for assistance with activity   - Consult OT/PT to assist with strengthening/mobility   - Keep Call bell within reach  - Keep bed low and locked with side rails adjusted as appropriate  - Keep care items and personal belongings within reach  - Initiate and maintain comfort rounds  - Make Fall Risk Sign visible to staff  - Offer Toileting every  2 Hours, in advance of need  - Obtain necessary fall risk management equipmen  - Apply yellow socks and bracelet for high fall risk patients  - Consider moving patient to room near nurses station  Outcome: Progressing  Goal: Maintain or return to baseline ADL function  Description: INTERVENTIONS:  -  Assess patient's ability to carry out ADLs; assess patient's baseline for ADL function and identify physical deficits which impact ability to perform ADLs (bathing, care of mouth/teeth, toileting, grooming, dressing, etc )  - Assess/evaluate cause of self-care deficits   - Assess range of motion  - Assess patient's mobility; develop plan if impaired  - Assess patient's need for assistive devices and provide as appropriate  - Encourage maximum independence but intervene and supervise when necessary  - Involve family in performance of ADLs  - Assess for home care needs following discharge   - Consider OT consult to assist with ADL evaluation and planning for discharge  - Provide patient education as appropriate  Outcome: Progressing  Goal: Maintains/Returns to pre admission functional level  Description: INTERVENTIONS:  - Perform BMAT or MOVE assessment daily    - Set and communicate daily mobility goal to care team and patient/family/caregiver  - Collaborate with rehabilitation services on mobility goals if consulted  - Ambulate patient 3 times a day  - Out of bed to chair 3  times a day   - Out of bed for meals 3 times a day  - Out of bed for toileting  - Record patient progress and toleration of activity level   Outcome: Progressing     Problem: DISCHARGE PLANNING  Goal: Discharge to home or other facility with appropriate resources  Description: INTERVENTIONS:  - Identify barriers to discharge w/patient and caregiver  - Arrange for needed discharge resources and transportation as appropriate  - Identify discharge learning needs (meds, wound care, etc )  - Arrange for interpretive services to assist at discharge as needed  - Refer to Case Management Department for coordinating discharge planning if the patient needs post-hospital services based on physician/advanced practitioner order or complex needs related to functional status, cognitive ability, or social support system  Outcome: Progressing     Problem: Knowledge Deficit  Goal: Patient/family/caregiver demonstrates understanding of disease process, treatment plan, medications, and discharge instructions  Description: Complete learning assessment and assess knowledge base    Interventions:  - Provide teaching at level of understanding  - Provide teaching via preferred learning methods  Outcome: Progressing     Problem: CARDIOVASCULAR - ADULT  Goal: Maintains optimal cardiac output and hemodynamic stability  Description: INTERVENTIONS:  - Monitor I/O, vital signs and rhythm  - Monitor for S/S and trends of decreased cardiac output  - Administer and titrate ordered vasoactive medications to optimize hemodynamic stability  - Assess quality of pulses, skin color and temperature  - Assess for signs of decreased coronary artery perfusion  - Instruct patient to report change in severity of symptoms  Outcome: Progressing  Goal: Absence of cardiac dysrhythmias or at baseline rhythm  Description: INTERVENTIONS:  - Continuous cardiac monitoring, vital signs, obtain 12 lead EKG if ordered  - Administer antiarrhythmic and heart rate control medications as ordered  - Monitor electrolytes and administer replacement therapy as ordered  Outcome: Progressing     Problem: SKIN/TISSUE INTEGRITY - ADULT  Goal: Skin Integrity remains intact(Skin Breakdown Prevention)  Description: Assess:  -Perform Sarwat assessment twice day  -Clean and moisturize skin every day    Outcome: Progressing  Goal: Incision(s), wounds(s) or drain site(s) healing without S/S of infection  Description: INTERVENTIONS  - Assess and document dressing, incision, wound bed, drain sites and surrounding tissue  - Provide patient and family education    Outcome: Progressing     Problem: Potential for Falls  Goal: Patient will remain free of falls  Description: INTERVENTIONS:  - Educate patient/family on patient safety including physical limitations  - Instruct patient to call for assistance with activity   - Consult OT/PT to assist with strengthening/mobility   - Keep Call bell within reach  - Keep bed low and locked with side rails adjusted as appropriate  - Keep care items and personal belongings within reach  - Initiate and maintain comfort rounds  - Make Fall Risk Sign visible to staff  - Offer Toileting every  2 Hours, in advance of need  - Obtain necessary fall risk management equipmen  - Apply yellow socks and bracelet for high fall risk patients  - Consider moving patient to room near nurses station  Outcome: Progressing     Problem: Prexisting or High Potential for Compromised Skin Integrity  Goal: Skin integrity is maintained or improved  Description: INTERVENTIONS:  - Identify patients at risk for skin breakdown  - Assess and monitor skin integrity  - Assess and monitor nutrition and hydration status  - Monitor labs   - Assess for incontinence   - Turn and reposition patient  - Assist with mobility/ambulation  - Relieve pressure over bony prominences  - Avoid friction and shearing  - Provide appropriate hygiene as needed including keeping skin clean and dry  - Evaluate need for skin moisturizer/barrier cream  - Collaborate with interdisciplinary team   - Patient/family teaching  - Consider wound care consult   Outcome: Progressing     Problem: MOBILITY - ADULT  Goal: Maintain or return to baseline ADL function  Description: INTERVENTIONS:  -  Assess patient's ability to carry out ADLs; assess patient's baseline for ADL function and identify physical deficits which impact ability to perform ADLs (bathing, care of mouth/teeth, toileting, grooming, dressing, etc )  - Assess/evaluate cause of self-care deficits   - Assess range of motion  - Assess patient's mobility; develop plan if impaired  - Assess patient's need for assistive devices and provide as appropriate  - Encourage maximum independence but intervene and supervise when necessary  - Involve family in performance of ADLs  - Assess for home care needs following discharge   - Consider OT consult to assist with ADL evaluation and planning for discharge  - Provide patient education as appropriate  Outcome: Progressing  Goal: Maintains/Returns to pre admission functional level  Description: INTERVENTIONS:  - Perform BMAT or MOVE assessment daily    - Set and communicate daily mobility goal to care team and patient/family/caregiver     - Collaborate with rehabilitation services on mobility goals if consulted  - Ambulate patient 3 times a day  - Out of bed to chair 3 times a day   - Out of bed for meals 3 times a day  - Out of bed for toileting  - Record patient progress and toleration of activity level   Outcome: Progressing     Problem: METABOLIC, FLUID AND ELECTROLYTES - ADULT  Goal: Electrolytes maintained within normal limits  Description: INTERVENTIONS:  - Monitor labs and assess patient for signs and symptoms of electrolyte imbalances  - Administer electrolyte replacement as ordered  - Monitor response to electrolyte replacements, including repeat lab results as appropriate  - Instruct patient on fluid and nutrition as appropriate  Outcome: Progressing  Goal: Fluid balance maintained  Description: INTERVENTIONS:  - Monitor labs   - Monitor I/O and WT  - Instruct patient on fluid and nutrition as appropriate  - Assess for signs & symptoms of volume excess or deficit  Outcome: Progressing

## 2022-07-17 NOTE — PLAN OF CARE
Problem: PAIN - ADULT  Goal: Verbalizes/displays adequate comfort level or baseline comfort level  Description: Interventions:  - Encourage patient to monitor pain and request assistance  - Assess pain using appropriate pain scale  - Administer analgesics based on type and severity of pain and evaluate response  - Implement non-pharmacological measures as appropriate and evaluate response  - Consider cultural and social influences on pain and pain management  - Notify physician/advanced practitioner if interventions unsuccessful or patient reports new pain  Outcome: Progressing     Problem: SAFETY ADULT  Goal: Patient will remain free of falls  Description: INTERVENTIONS:  - Educate patient/family on patient safety including physical limitations  - Instruct patient to call for assistance with activity   - Consult OT/PT to assist with strengthening/mobility   - Keep Call bell within reach  - Keep bed low and locked with side rails adjusted as appropriate  - Keep care items and personal belongings within reach  - Initiate and maintain comfort rounds  - Make Fall Risk Sign visible to staff  - Offer Toileting every  2 Hours, in advance of need  - Obtain necessary fall risk management equipmen  - Apply yellow socks and bracelet for high fall risk patients  - Consider moving patient to room near nurses station  Outcome: Progressing  Goal: Maintain or return to baseline ADL function  Description: INTERVENTIONS:  -  Assess patient's ability to carry out ADLs; assess patient's baseline for ADL function and identify physical deficits which impact ability to perform ADLs (bathing, care of mouth/teeth, toileting, grooming, dressing, etc )  - Assess/evaluate cause of self-care deficits   - Assess range of motion  - Assess patient's mobility; develop plan if impaired  - Assess patient's need for assistive devices and provide as appropriate  - Encourage maximum independence but intervene and supervise when necessary  - Involve family in performance of ADLs  - Assess for home care needs following discharge   - Consider OT consult to assist with ADL evaluation and planning for discharge  - Provide patient education as appropriate  Outcome: Progressing  Goal: Maintains/Returns to pre admission functional level  Description: INTERVENTIONS:  - Perform BMAT or MOVE assessment daily    - Set and communicate daily mobility goal to care team and patient/family/caregiver  - Collaborate with rehabilitation services on mobility goals if consulted  - Ambulate patient 3 times a day  - Out of bed to chair 3  times a day   - Out of bed for meals 3 times a day  - Out of bed for toileting  - Record patient progress and toleration of activity level   Outcome: Progressing     Problem: DISCHARGE PLANNING  Goal: Discharge to home or other facility with appropriate resources  Description: INTERVENTIONS:  - Identify barriers to discharge w/patient and caregiver  - Arrange for needed discharge resources and transportation as appropriate  - Identify discharge learning needs (meds, wound care, etc )  - Arrange for interpretive services to assist at discharge as needed  - Refer to Case Management Department for coordinating discharge planning if the patient needs post-hospital services based on physician/advanced practitioner order or complex needs related to functional status, cognitive ability, or social support system  Outcome: Progressing     Problem: Knowledge Deficit  Goal: Patient/family/caregiver demonstrates understanding of disease process, treatment plan, medications, and discharge instructions  Description: Complete learning assessment and assess knowledge base    Interventions:  - Provide teaching at level of understanding  - Provide teaching via preferred learning methods  Outcome: Progressing     Problem: CARDIOVASCULAR - ADULT  Goal: Maintains optimal cardiac output and hemodynamic stability  Description: INTERVENTIONS:  - Monitor I/O, vital signs and rhythm  - Monitor for S/S and trends of decreased cardiac output  - Administer and titrate ordered vasoactive medications to optimize hemodynamic stability  - Assess quality of pulses, skin color and temperature  - Assess for signs of decreased coronary artery perfusion  - Instruct patient to report change in severity of symptoms  Outcome: Progressing  Goal: Absence of cardiac dysrhythmias or at baseline rhythm  Description: INTERVENTIONS:  - Continuous cardiac monitoring, vital signs, obtain 12 lead EKG if ordered  - Administer antiarrhythmic and heart rate control medications as ordered  - Monitor electrolytes and administer replacement therapy as ordered  Outcome: Progressing     Problem: SKIN/TISSUE INTEGRITY - ADULT  Goal: Skin Integrity remains intact(Skin Breakdown Prevention)  Description: Assess:  -Perform [place] assessment twice day  -Clean and moisturize skin every day    Outcome: Progressing  Goal: Incision(s), wounds(s) or drain site(s) healing without S/S of infection  Description: INTERVENTIONS  - Assess and document dressing, incision, wound bed, drain sites and surrounding tissue  - Provide patient and family education    Outcome: Progressing     Problem: METABOLIC, FLUID AND ELECTROLYTES - ADULT  Goal: Electrolytes maintained within normal limits  Description: INTERVENTIONS:  - Monitor labs and assess patient for signs and symptoms of electrolyte imbalances  - Administer electrolyte replacement as ordered  - Monitor response to electrolyte replacements, including repeat lab results as appropriate  - Instruct patient on fluid and nutrition as appropriate  Outcome: Progressing  Goal: Fluid balance maintained  Description: INTERVENTIONS:  - Monitor labs   - Monitor I/O and WT  - Instruct patient on fluid and nutrition as appropriate  - Assess for signs & symptoms of volume excess or deficit  Outcome: Progressing     Problem: Potential for Falls  Goal: Patient will remain free of falls  Description: INTERVENTIONS:  - Educate patient/family on patient safety including physical limitations  - Instruct patient to call for assistance with activity   - Consult OT/PT to assist with strengthening/mobility   - Keep Call bell within reach  - Keep bed low and locked with side rails adjusted as appropriate  - Keep care items and personal belongings within reach  - Initiate and maintain comfort rounds  - Make Fall Risk Sign visible to staff  - Offer Toileting every  2 Hours, in advance of need  - Obtain necessary fall risk management equipmen  - Apply yellow socks and bracelet for high fall risk patients  - Consider moving patient to room near nurses station  Outcome: Progressing     Problem: Prexisting or High Potential for Compromised Skin Integrity  Goal: Skin integrity is maintained or improved  Description: INTERVENTIONS:  - Identify patients at risk for skin breakdown  - Assess and monitor skin integrity  - Assess and monitor nutrition and hydration status  - Monitor labs   - Assess for incontinence   - Turn and reposition patient  - Assist with mobility/ambulation  - Relieve pressure over bony prominences  - Avoid friction and shearing  - Provide appropriate hygiene as needed including keeping skin clean and dry  - Evaluate need for skin moisturizer/barrier cream  - Collaborate with interdisciplinary team   - Patient/family teaching  - Consider wound care consult   Outcome: Progressing     Problem: MOBILITY - ADULT  Goal: Maintain or return to baseline ADL function  Description: INTERVENTIONS:  -  Assess patient's ability to carry out ADLs; assess patient's baseline for ADL function and identify physical deficits which impact ability to perform ADLs (bathing, care of mouth/teeth, toileting, grooming, dressing, etc )  - Assess/evaluate cause of self-care deficits   - Assess range of motion  - Assess patient's mobility; develop plan if impaired  - Assess patient's need for assistive devices and provide as appropriate  - Encourage maximum independence but intervene and supervise when necessary  - Involve family in performance of ADLs  - Assess for home care needs following discharge   - Consider OT consult to assist with ADL evaluation and planning for discharge  - Provide patient education as appropriate  Outcome: Progressing  Goal: Maintains/Returns to pre admission functional level  Description: INTERVENTIONS:  - Perform BMAT or MOVE assessment daily    - Set and communicate daily mobility goal to care team and patient/family/caregiver     - Collaborate with rehabilitation services on mobility goals if consulted  - Ambulate patient 3 times a day  - Out of bed to chair 3  times a day   - Out of bed for meals 3 times a day  - Out of bed for toileting  - Record patient progress and toleration of activity level   Outcome: Progressing

## 2022-07-17 NOTE — PROGRESS NOTES
NEPHROLOGY PROGRESS NOTE   Ines Pro 63 y o  female MRN: [id number]  Unit/Bed#: [place] [id number] -01 Encounter: [id number]  Reason for Consult: ALFRED, POA on CKD IV    68-year-old female with uncontrolled DM 2, CKD 4, CHF, CAD, chronic suprapubic catheter presents with worsening dyspnea  Nephrology consulted for management of ALFRED     ASSESSMENT/PLAN:  ALFRED (POA):  Suspect   -Admission creatinine 2 94 which continue to increase  Today's creatinine 3 20, s/p HD 7/16  -Peak creatinine 3 86  -baseline creatinine 2 2-2 9  -history of multiple episodes of ALFRED  -Imaging: Renal ultrasound 6/23/2022 with normal echogenicity and contour or, no hydronephrosis  -nonoliguric   UO 03539 ml/24 hrs  I/O -1600  Wt down 2 kg  -Plan:  · New start hemodialysis 7/16/22   ml  · Plan for 2nd hemodialysis treatment 7/18/2022  · Strict I/Os, daily weights  · Avoid nephrotoxins, NSAIDs, IV contrast if possible  · Avoid hypotension    Maintain MAP >65  · Trend BMP  · Adjust meds to appropriate GFR  · Optimize hemodynamic status to avoid delay in renal recovery  · Case management for outpatient dialysis center placement     Chronic kidney disease IV with progression to ESRD:    -Etiology:  Suspect related to diabetic nephropathy, hypertensive nephrosclerosis, cardiorenal syndrome, multiple episodes of ALFRED and obesity  -Baseline creatinine 2 2-2 9  -Follows with Dr Jocelin Vegas     Hypertension/Volume status:  -BP acceptable  -clinically, examines mildly hypervolemic  -Home medications:  Bisoprolol 2 5 mg daily, amlodipine 10 mg daily, Imdur 60 mg daily,  -Current medications:  Amlodipine 2 5 mg daily, bisoprolol 2 5 mg daily, Imdur 60 mg daily, Imdur 30 mg q p m , torsemide 40 mg b i d   -will continue BID diuretics for now,, consider deescalating after full HD treatment  -ARB being initiated by cardiology  -Optimize hemodynamic status to avoid delay in renal recovery  -recommend hold parameters on antihypertensive's for SBP <130 mmHg   -Avoid hypotension or fluctuations in blood pressure  Maintain MAP >65  -continue to trend     Acute on Chronic combined diastolic/systolic congestive heart failure:  -with new cardiomyopathy  -echo 6/19/2022:  EF 38% with marked RWMA,, +diastolic dysfunction, mild left atrial dilatation, mild AI, mild MR, mild TR, trace PI  Dilated IVC  -volume status appears mildly hypervolemic  -on beta-blockers, Indur and torsemide  Plan to start ARB per cardiology  D/w cardiology  -management per Cardiology     Electrolytes/acid base:  -stable  -will manage through dialysis  -continue to monitor     Anemia of CKD:  -Hgb 8 5  Goal >10  -Iron studies:  Iron 40, ferritin 59, iron saturation 17, TIBC 240  -would benefit from supplementation, IV Venofer once on fully dialysis treatments  -continue to trend  -Transfuse for Hgb <7 0  -management per primary team     Bone Mineral Disease of CKD:  -phosphorus 4 1, at goal  -continue to monitor and follow phosphorus, PTH , calcium, magnesium as outpatient     DM II:  -uncontrolled  -HgbA1C 7 3  -continue to optimize blood sugar control  -management per SLIM     Disposition:  Plan for 2nd hemodialysis treatment Monday, 7/18/2022    SUBJECTIVE:  Patient doing well, without complaints  Tolerating 1st hemodialysis treatment yesterday without incident  VSS    A complete review of systems was performed  Pertinent positives and negatives noted in the HPI  Otherwise the review of systems was negative      OBJECTIVE:  Current Weight: Weight - Scale: 121 kg (267 lb 3 2 oz)  Vitals:    07/16/22 2114 07/17/22 0453 07/17/22 0550 07/17/22 0738   BP: 148/58   144/58   BP Location: Left arm      Pulse: 64   64   Resp: 18   20   Temp: 98 °F (36 7 °C)   98 °F (36 7 °C)   TempSrc: Oral      SpO2: 95%   91%   Weight:  121 kg (267 lb 3 2 oz) 121 kg (267 lb 3 2 oz)    Height:           Intake/Output Summary (Last 24 hours) at 7/17/2022 0800  Last data filed at 7/16/2022 2200  Gross per 24 hour Intake 500 ml   Output 2133 ml   Net -1633 ml     General:  Awake, alert, appears comfortable and in no acute distress  Nontoxic  Skin:  No rash, warm, good skin turgor   Eyes:  PERRL, EOMI, sclerae nonicteric   no periorbital edema   ENT:  Moist mucous membranes  Neck:  Trachea midline, symmetric  No JVD  Chest:  Clear to auscultation bilaterally without wheezes, crackles or rhonchi  CVS:  Regular rate and rhythm without murmur, gallop or rub  S1 and S2 identified and normal   No S3, S4    Abdomen:  Soft, nontender, nondistended without masses  Normal bowel sounds x 4 quadrants  No bruit  Extremities:  Warm, pink, motor and sensory intact and well perfused  No cyanosis, pallor  trace BLE edema  Neuro:  Awake, alert, oriented x3  Grossly intact  Psych:  Appropriate affect  Mentating appropriately  Normal mental status exam  :  Chronic suprapubic catheter in place  Draining dilute urine  Access:  R IJV tunneled PermCath site clear with dressing intact  No erythema, induration, hematoma or drainage        Medications:    Current Facility-Administered Medications:     acetaminophen (TYLENOL) tablet 650 mg, 650 mg, Oral, Q6H PRN, SETH De Leon, 650 mg at 07/16/22 0410    allopurinol (ZYLOPRIM) tablet 300 mg, 300 mg, Oral, Daily, SETH De Leon, 300 mg at 07/16/22 6187    amLODIPine (NORVASC) tablet 2 5 mg, 2 5 mg, Oral, BID, Delfina Thomas MD    aspirin (ECOTRIN LOW STRENGTH) EC tablet 81 mg, 81 mg, Oral, Daily, SETH De Leon, 81 mg at 07/16/22 2436    atorvastatin (LIPITOR) tablet 40 mg, 40 mg, Oral, Daily, SETH De Leon, 40 mg at 07/16/22 8904    bisoprolol (ZEBETA) tablet 2 5 mg, 2 5 mg, Oral, Daily, Bere Mejia MD, 2 5 mg at 07/16/22 1240    citalopram (CeleXA) tablet 40 mg, 40 mg, Oral, Daily, SETH De Leon, 40 mg at 07/16/22 7509    clopidogrel (PLAVIX) tablet 75 mg, 75 mg, Oral, Daily, SETH De Leon, 75 mg at 07/16/22 4083    diphenhydrAMINE (BENADRYL) tablet 25 mg, 25 mg, Oral, Q6H PRN, LANRE Barnhart-C, 25 mg at 07/16/22 2343    fentanyl citrate (PF) 100 MCG/2ML, , Intravenous, Code/Trauma/Sedation Med, Js Ohara MD, 50 mcg at 07/15/22 1459    gabapentin (NEURONTIN) capsule 400 mg, 400 mg, Oral, Daily, Maico Watt PA-C, 400 mg at 07/16/22 8060    heparin (porcine) subcutaneous injection 5,000 Units, 5,000 Units, Subcutaneous, Q8H [facility], 5,000 Units at 07/17/22 3036 **AND** [CANCELED] Platelet count, , , Once, Maico Watt PA-C    HYDROcodone-acetaminophen [facility]) 5-325 mg per tablet 1 tablet, 1 tablet, Oral, TID PRN, Maico Watt PA-C, 1 tablet at 07/16/22 2129    insulin glargine (LANTUS) subcutaneous injection 40 Units 0 4 mL, 40 Units, Subcutaneous, HS, Chaparrita Hope MD, 40 Units at 07/16/22 2129    insulin lispro (HumaLOG) 100 units/mL subcutaneous injection 2-12 Units, 2-12 Units, Subcutaneous, HS, Maico Watt PA-C, 6 Units at 07/16/22 2129    insulin lispro (HumaLOG) 100 units/mL subcutaneous injection 2-12 Units, 2-12 Units, Subcutaneous, TID AC, 4 Units at 07/16/22 1240 **AND** Fingerstick Glucose (POCT), , , 4x Daily AC and at bedtime, Bere Hawley MD    insulin lispro (HumaLOG) 100 units/mL subcutaneous injection 5 Units, 5 Units, Subcutaneous, TID With Meals, Maico Watt PA-C, 5 Units at 07/16/22 1652    isosorbide mononitrate (IMDUR) 24 hr tablet 30 mg, 30 mg, Oral, QPM, Delfina Thomas MD    isosorbide mononitrate (IMDUR) 24 hr tablet 60 mg, 60 mg, Oral, Daily, Claudio Schmidt MD, 60 mg at 07/16/22 1239    levothyroxine tablet 50 mcg, 50 mcg, Oral, Early Morning, Dirk Senate, PA-C, 50 mcg at 07/17/22 7602    lidocaine (LIDODERM) 5 % patch 1 patch, 1 patch, Topical, Daily, Dirk Senate, PA-C, 1 patch at 07/16/22 0079    melatonin tablet 6 mg, 6 mg, Oral, HS, Debi House PA-C, 6 mg at 07/16/22 2128    midazolam (VERSED) injection, , , Code/Trauma/Sedation Med, Js Ohara MD, 1 mg at 07/15/22 1459    nitroglycerin (NITROSTAT) SL tablet 0 4 mg, 0 4 mg, Sublingual, Q5 Min PRN, Dayan Cooley, PA-C, 0 4 mg at 07/09/22 1930    nystatin (MYCOSTATIN) powder, , Topical, BID, Dayan Beams, PA-C, Given at 07/16/22 1653    OLANZapine (ZyPREXA) tablet 5 mg, 5 mg, Oral, HS, Dayan Beams, PA-C, 5 mg at 07/16/22 2129    ondansetron (ZOFRAN) injection 4 mg, 4 mg, Intravenous, Q6H PRN, Dayan Beams, PA-C    pantoprazole (PROTONIX) EC tablet 40 mg, 40 mg, Oral, BID AC, Dayan Beams, PA-C, 40 mg at 07/17/22 8417    senna (SENOKOT) tablet 8 6 mg, 1 tablet, Oral, Daily, Dayan Cooley, PA-C, 8 6 mg at 07/16/22 3602    tiZANidine (ZANAFLEX) tablet 4 mg, 4 mg, Oral, Q8H PRN, Dayan Cooley, PA-C, 4 mg at 07/13/22 0045    torsemide (DEMADEX) tablet 40 mg, 40 mg, Oral, BID, Geri Estrada MD, 40 mg at 07/16/22 2129    Laboratory Results:  Results from last 7 days   Lab Units 07/17/22  0450 07/16/22  0500 07/15/22  0532 07/14/22  0504 07/13/22  0517 07/12/22  0524 07/11/22  0557   WBC Thousand/uL  --  6 61 5 96 6 47 6 49  --  6 04   HEMOGLOBIN g/dL  --  8 5* 8 3* 8 9* 8 2*  --  8 3*   HEMATOCRIT %  --  26 7* 26 7* 28 0* 25 6*  --  26 6*   PLATELETS Thousands/uL  --  211 216 236 228  --  259   SODIUM mmol/L 142 142 142 142 141 141 142   POTASSIUM mmol/L 3 6 3 5 3 6 3 9 3 8 4 1 4 2   CHLORIDE mmol/L 103 103 104 103 102 102 103   CO2 mmol/L 28 29 28 29 28 29 28   BUN mg/dL 65* 88* 90* 90* 93* 89* 86*   CREATININE mg/dL 3 20* 3 86* 3 56* 3 52* 3 48* 3 60* 3 25*   CALCIUM mg/dL 8 1* 8 4 8 5 8 5 8 3 8 1* 8 5   PHOSPHORUS mg/dL 4 1  --   --   --   --   --   --        I have personally reviewed the blood work as stated above and in my note  I have personally reviewed internal Medicine, co-consultants and previous nephrology notes

## 2022-07-17 NOTE — PROGRESS NOTES
Progress Note - Cardiology   Eric Hartmann 63 y o  female MRN: [id number]  Unit/Bed#: [place] 2 -01 Encounter: [id number]    Asessment:  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 heart failure  Ischemic cardiomyopathy              - LVEF 30%, moderate LVH, mild LA dilatation, AV sclerosis, trace AR, mild MR, MV sclerosis, mild TR, June 2022  Coronary artery disease with multiple PCIs in the past              - NSTEMI s/p JANET-mLCx w/ small 95% pRCA and diffuse moderate disease of LAD, July 2022               - S/P -pRCA, August 2021               - S/P JANET-mLAD, JANET-D1 and JANET-LCx, December 2012  Acute kidney injury on chronic kidney disease stage IV with progression to ESRD  Nonsustained ventricular tachycardia, 8 beat run via telemetry  Hypertension  Dyslipidemia              - lipid panel 6/24/22: Cholesterol 120, triglycerides 138, HDL 42, LDL 50  Morbid obesity  Anemia of chronic kidney disease  Type 2 diabetes mellitus  Polycystic ovarian syndrome  Neurogenic bladder with suprapubic catheter in place  Obstructive sleep apnea, not on CPAP  Hx syncope with orthostatic hypotension  Hypothyroid     Plan/ Discussion:  · PermCath in place and patient initiated on hemodialysis yesterday, 7/16/22; patient tolerated the first treatment well without hypotension or angina  Plan for second treatment tomorrow, Monday  Continues on torsemide 40 mg twice daily as well  Will defer diuretic management and adjustment to the Nephrology team as patient is now receiving dialysis  · Currently on amlodipine 2 5 mg twice daily, bisoprolol 2 5 mg daily, isosorbide mononitrate 60 mg in the morning + 30 mg in the evening, Plavix 75 mg daily, aspirin 81 mg daily, atorvastatin 40 mg daily  Ok to initiate Losartan 25 mg daily after discussion with Nephrology today  Plan to transition patient off amlodipine as blood pressure tolerates  · To consider [facility] prior to discharge     · Monitor volume status closely with strict intake/output, daily weight  · Recommend to maintain potassium > 4, magnesium > 2        Subjective:  Patient seen and examined at the bedside  Patient sitting on the edge of the bed and feeling well  She denies chest pain or shortness of breath       Vitals:  Vitals:    07/17/22 0453 07/17/22 0550   Weight: 121 kg (267 lb 3 2 oz) 121 kg (267 lb 3 2 oz)   ,  Vitals:    07/16/22 2114 07/17/22 0453 07/17/22 0550 07/17/22 0738   BP: 148/58   144/58   BP Location: Left arm      Pulse: 64   64   Resp: 18   20   Temp: 98 °F (36 7 °C)   98 °F (36 7 °C)   TempSrc: Oral      SpO2: 95%   91%   Weight:  121 kg (267 lb 3 2 oz) 121 kg (267 lb 3 2 oz)    Height:           Exam:  General: alert awake and oriented, no acute distress  Heart: regular rate, regular rhythm , S1, S2   Respiratory effort/ Lungs: diminished breath sounds bilateral bases, otherwise clear   Abdominal: obese, rounded, normoactive bowel sounds   Lower Limbs: trace bilateral lower extremity edema, improving from prior    Medications:    Current Facility-Administered Medications:     acetaminophen (TYLENOL) tablet 650 mg, 650 mg, Oral, Q6H PRN, SETH De Leon, 650 mg at 07/16/22 0410    allopurinol (ZYLOPRIM) tablet 300 mg, 300 mg, Oral, Daily, SETH De Leon, 300 mg at 07/17/22 0811    amLODIPine (NORVASC) tablet 2 5 mg, 2 5 mg, Oral, BID, Delfina Thomas MD, 2 5 mg at 07/17/22 0811    aspirin (ECOTRIN LOW STRENGTH) EC tablet 81 mg, 81 mg, Oral, Daily, SETH De Leon, 81 mg at 07/17/22 7528    atorvastatin (LIPITOR) tablet 40 mg, 40 mg, Oral, Daily, SETH De Leon, 40 mg at 07/17/22 7572    bisoprolol (ZEBETA) tablet 2 5 mg, 2 5 mg, Oral, Daily, Bere Mejia MD, 2 5 mg at 07/17/22 0907    citalopram (CeleXA) tablet 40 mg, 40 mg, Oral, Daily, SETH De Leon, 40 mg at 07/17/22 1318    clopidogrel (PLAVIX) tablet 75 mg, 75 mg, Oral, Daily, SETH De Leon, 75 mg at 07/17/22 0811    diphenhydrAMINE (BENADRYL) tablet 25 mg, 25 mg, Oral, Q6H PRN, Isabella Jasso PA-C, 25 mg at 07/16/22 2343    fentanyl citrate (PF) 100 MCG/2ML, , Intravenous, Code/Trauma/Sedation Med, Anju Vick MD, 50 mcg at 07/15/22 1459    gabapentin (NEURONTIN) capsule 400 mg, 400 mg, Oral, Daily, Adriana Jacobo PA-C, 400 mg at 07/17/22 3575    heparin (porcine) subcutaneous injection 5,000 Units, 5,000 Units, Subcutaneous, Q8H [address], 5,000 Units at 07/17/22 3274 **AND** [CANCELED] Platelet count, , , Once, Adriana Jacobo PA-C    HYDROcodone-acetaminophen [facility]) 5-325 mg per tablet 1 tablet, 1 tablet, Oral, TID PRN, Adriana Jacobo PA-C, 1 tablet at 07/16/22 2129    insulin glargine (LANTUS) subcutaneous injection 40 Units 0 4 mL, 40 Units, Subcutaneous, HS, Vincent Baig MD, 40 Units at 07/16/22 2129    insulin lispro (HumaLOG) 100 units/mL subcutaneous injection 2-12 Units, 2-12 Units, Subcutaneous, HS, Adriana Jacobo PA-C, 6 Units at 07/16/22 2129    insulin lispro (HumaLOG) 100 units/mL subcutaneous injection 2-12 Units, 2-12 Units, Subcutaneous, TID AC, 2 Units at 07/17/22 0811 **AND** Fingerstick Glucose (POCT), , , 4x Daily AC and at bedtime, Bere Collado MD    insulin lispro (HumaLOG) 100 units/mL subcutaneous injection 5 Units, 5 Units, Subcutaneous, TID With Meals, Adriana Jacobo PA-C, 5 Units at 07/17/22 0812    isosorbide mononitrate (IMDUR) 24 hr tablet 30 mg, 30 mg, Oral, QPM, Delfina Thomas MD    isosorbide mononitrate (IMDUR) 24 hr tablet 60 mg, 60 mg, Oral, Daily, Clauido Schmidt MD, 60 mg at 07/17/22 0811    levothyroxine tablet 50 mcg, 50 mcg, Oral, Early Morning, Adriana Jacobo PA-C, 50 mcg at 07/17/22 4608    lidocaine (LIDODERM) 5 % patch 1 patch, 1 patch, Topical, Daily, Adriana Jacobo PA-C, 1 patch at 07/17/22 0811    melatonin tablet 6 mg, 6 mg, Oral, HS, Debi House PA-C, 6 mg at 07/16/22 2128    midazolam (VERSED) injection, , , Code/Trauma/Sedation Med, Anju Vick MD, 1 mg at 07/15/22 1459    nitroglycerin (NITROSTAT) SL tablet 0 4 mg, 0 4 mg, Sublingual, Q5 Min PRN, Dirk Senate, PA-C, 0 4 mg at 07/09/22 1930    nystatin (MYCOSTATIN) powder, , Topical, BID, Dirk Senate, PA-C, Given at 07/17/22 0820    OLANZapine (ZyPREXA) tablet 5 mg, 5 mg, Oral, HS, Dirk Senate, PA-C, 5 mg at 07/16/22 2129    ondansetron (ZOFRAN) injection 4 mg, 4 mg, Intravenous, Q6H PRN, Dirk Senate, PA-C    pantoprazole (PROTONIX) EC tablet 40 mg, 40 mg, Oral, BID AC, Dirk Senate, PA-C, 40 mg at 07/17/22 1777    senna (SENOKOT) tablet 8 6 mg, 1 tablet, Oral, Daily, Dirk Senate, PA-C, 8 6 mg at 07/17/22 6189    tiZANidine (ZANAFLEX) tablet 4 mg, 4 mg, Oral, Q8H PRN, Dirk Senate, PA-C, 4 mg at 07/13/22 0045    torsemide (DEMADEX) tablet 40 mg, 40 mg, Oral, BID, Kamar Koo MD, 40 mg at 07/17/22 0811      Labs/Data:        Results from last 7 days   Lab Units 07/16/22  0500 07/15/22  0532 07/14/22  0504   WBC Thousand/uL 6 61 5 96 6 47   HEMOGLOBIN g/dL 8 5* 8 3* 8 9*   HEMATOCRIT % 26 7* 26 7* 28 0*   PLATELETS Thousands/uL 211 216 236     Results from last 7 days   Lab Units 07/17/22  0450 07/16/22  0500 07/15/22  0532   POTASSIUM mmol/L 3 6 3 5 3 6   CHLORIDE mmol/L 103 103 104   CO2 mmol/L 28 29 28   BUN mg/dL 65* 88* 90*

## 2022-07-17 NOTE — ASSESSMENT & PLAN NOTE
Anemia secondary to renal disease  Monitor and transfuse as needed  Defer EPO administration/infusion to nephrology  Repeat CBC in a m

## 2022-07-17 NOTE — ASSESSMENT & PLAN NOTE
Lab Results   Component Value Date    HGBA1C 7 3 (A) 03/29/2022     · A1c is acceptable but inpatient blood sugars are not controlled with fasting and prandial hyperglycemia  · Simplified Lantus dosing to once daily, from 20 b i d  To 40 daily  · Continue mealtime Humalog 5 units with meals    · Watch for hypoglycemia due to  ESRD  · Continue  Diabetic diet

## 2022-07-17 NOTE — ASSESSMENT & PLAN NOTE
· With progression of renal disease now deemed to be end-stage    · Status post PermCath placement on 07/15/2022  · Tolerated 1st day of hemodialysis on 07/16/2022  · Next hemodialysis tomorrow 07/18/2020  · Will need outpatient hemodialysis chair set up prior to discharge

## 2022-07-17 NOTE — PROGRESS NOTES
[facility]  Progress Note - Annmarie Anne 1960, 63 y o  female MRN: [id number]  Unit/Bed#: [address] [place] 206-01 Encounter: [id number]  Primary Care Provider: CHERELLE Caraballo   Date and time admitted to hospital: 7/6/2022  4:59 PM    * 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 heart failure ([facility] )  Assessment & Plan  · CHF with worsening renal function now on hemodialysis  · Overall improved  · Continue volume control via HD and torsemide 40 mg b i d   · Continue bisoprolol 2 5 mg daily  · Close follow-up with Cardiology after discharge      ESRD (end stage renal disease) ([facility] )  Assessment & Plan  · With progression of renal disease now deemed to be end-stage  · Status post PermCath placement on 07/15/2022  · Tolerated 1st day of hemodialysis on 07/16/2022  · Next hemodialysis tomorrow 07/18/2020  · Will need outpatient hemodialysis chair set up prior to discharge    CAD (coronary artery disease)  Assessment & Plan  · Stable  · Known history of CAD with previous stenting in 2012  · Status post recent cardiac catheterization in July 1, 2022  · Continue aspirin, Plavix, bisoprolol 2 5 mg daily, and Imdur 60 mg daily Lipitor 40 mg daily    · Status post stenting in 2012, then revised in 2017    NSVT (nonsustained ventricular tachycardia) ([facility])  Assessment & Plan  · Known history of cardiomyopathy with EF 30%  · Cardiology recommendations reviewed  · Possible life vest placement  · Continue beta-blocker    Type 2 diabetes mellitus with stage 5 chronic kidney disease not on chronic dialysis, with long-term current use of insulin ([facility] )  Assessment & Plan  Lab Results   Component Value Date    HGBA1C 7 3 (A) 03/29/2022     · A1c is acceptable but inpatient blood sugars are not controlled with fasting and prandial hyperglycemia  · Simplified Lantus dosing to once daily, from 20 b i d  To 40 daily  · Continue mealtime Humalog 5 units with meals    · Watch for hypoglycemia due to  ESRD  · Continue  Diabetic diet    Anemia  Assessment & Plan  Anemia secondary to renal disease  Monitor and transfuse as needed  Defer EPO administration/infusion to nephrology  Repeat CBC in a m  Acquired hypothyroidism  Assessment & Plan  · Continue levothyroxine 50 mcg daily      VTE Pharmacologic Prophylaxis:   Pharmacologic: Heparin  Mechanical VTE Prophylaxis in Place: No    Patient Centered Rounds: I have performed bedside rounds with nursing staff today  Discussions with Specialists or Other Care Team Provider:  Cardiology    Education and Discussions with Family / Patient:  Spoke with daughter-in-law Priya and gave update    Time Spent for Care: 20 minutes  More than 50% of total time spent on counseling and coordination of care as described above  Current Length of Stay: 11 day(s)    Current Patient Status: Inpatient   Certification Statement: The patient will continue to require additional inpatient hospital stay due to ESRD    Discharge Plan:  Eventual home with outpatient rehab once outpatient hemodialysis chair is confirmed    Code Status: Level 1 - Full Code    Subjective: Tolerated hemodialysis yesterday  Currently without shortness of breath  No additional complaints    Objective:     Vitals:   Temp (24hrs), Av 1 °F (36 7 °C), Min:98 °F (36 7 °C), Max:98 4 °F (36 9 °C)    Temp:  [98 °F (36 7 °C)-98 4 °F (36 9 °C)] 98 °F (36 7 °C)  HR:  [64-66] 64  Resp:  [18-20] 20  BP: (110-148)/(58-59) 144/58  SpO2:  [91 %-96 %] 91 %  Body mass index is 40 63 kg/m²  Input and Output Summary (last 24 hours): Intake/Output Summary (Last 24 hours) at 2022 1253  Last data filed at 2022 1011  Gross per 24 hour   Intake --   Output 2750 ml   Net -2750 ml       Physical Exam:     Physical Exam  Constitutional:       Appearance: She is obese  HENT:      Head: Normocephalic and atraumatic  Nose: No rhinorrhea     Eyes:      General: No scleral icterus  Cardiovascular:      Rate and Rhythm: Normal rate and regular rhythm  Heart sounds: No murmur heard  No gallop  Pulmonary:      Effort: Pulmonary effort is normal  No respiratory distress  Breath sounds: No wheezing  Abdominal:      General: Abdomen is flat  There is no distension  Palpations: Abdomen is soft  Tenderness: There is no abdominal tenderness  Musculoskeletal:      Cervical back: Neck supple  Right lower leg: No edema  Left lower leg: No edema  Skin:     General: Skin is warm and dry  Neurological:      Mental Status: She is alert and oriented to person, place, and time  Psychiatric:         Mood and Affect: Mood normal          Behavior: Behavior normal      Labs:    Results from last 7 days   Lab Units 07/16/22  0500 07/14/22  0504 07/13/22  0517   WBC Thousand/uL 6 61   < > 6 49   HEMOGLOBIN g/dL 8 5*   < > 8 2*   HEMATOCRIT % 26 7*   < > 25 6*   PLATELETS Thousands/uL 211   < > 228   NEUTROS PCT %  --   --  63   LYMPHS PCT %  --   --  23   MONOS PCT %  --   --  7   EOS PCT %  --   --  6    < > = values in this interval not displayed  Results from last 7 days   Lab Units 07/17/22  0450   SODIUM mmol/L 142   POTASSIUM mmol/L 3 6   CHLORIDE mmol/L 103   CO2 mmol/L 28   BUN mg/dL 65*   CREATININE mg/dL 3 20*   ANION GAP mmol/L 11   CALCIUM mg/dL 8 1*   GLUCOSE RANDOM mg/dL 195*         Results from last 7 days   Lab Units 07/17/22  1145 07/17/22  0735 07/16/22  2110 07/16/22  1622 07/16/22  1150 07/16/22  0734 07/15/22  2130 07/15/22  1549 07/15/22  1059 07/15/22  0738 07/14/22  2356 07/14/22  2045   POC GLUCOSE mg/dl 311* 167* 279* 146* 241* 232* 219* 232* 206* 170* 303* 265*     * I Have Reviewed All Lab Data Listed Above  * Additional Pertinent Lab Tests Reviewed:  All Labs Within Last 24 Hours Reviewed    Imaging:    Imaging Reports Reviewed Today Include:  None      Recent Cultures (last 7 days):           Last 24 Hours Medication List: Current Facility-Administered Medications   Medication Dose Route Frequency Provider Last Rate    acetaminophen  650 mg Oral Q6H PRN Briseyda Abrams PA-C      allopurinol  300 mg Oral Daily Arthur Mancini PA-C      amLODIPine  2 5 mg Oral BID Delfina Thomas MD      aspirin  81 mg Oral Daily Briseyda Abrams PA-C      atorvastatin  40 mg Oral Daily Briseyda Abrams [place]      bisoprolol  2 5 mg Oral Daily Bere Jimenes MD      citalopram  40 mg Oral Daily Briseyda Abrams PA-C      clopidogrel  75 mg Oral Daily Nanci Webster      diphenhydrAMINE  25 mg Oral Q6H PRN Alona Monterroso PA-C      fentanyl citrate (PF)   Intravenous Code/Trauma/Sedation Med Kajal Wei MD      gabapentin  400 mg Oral Daily Briseyda Abrams PA-C      heparin (porcine)  5,000 Units Subcutaneous [facility] Briseyda Abrams, [place]      HYDROcodone-acetaminophen  1 tablet Oral TID PRN Briseyda Abrams PA-C      insulin glargine  40 Units Subcutaneous HS Karyn Marley MD      insulin lispro  2-12 Units Subcutaneous HS Briseyda Abrams PA-C      insulin lispro  2-12 Units Subcutaneous TID AC Bere Jimenes MD      insulin lispro  5 Units Subcutaneous TID With Meals Briseyda Abrams PA-C      isosorbide mononitrate  30 mg Oral QPM Delfina Thomas MD      isosorbide mononitrate  60 mg Oral Daily Nathen Garces MD      levothyroxine  50 mcg Oral Early Morning Briseyda Abrams PA-C      lidocaine  1 patch Topical Daily Briseyda Abrams PA-C      losartan  25 mg Oral Daily CHERELLE Alarcon      melatonin  6 mg Oral HS Debi Corona PA-C      midazolam    Code/Trauma/Sedation Med Kajal Wei MD      nitroglycerin  0 4 mg Sublingual Q5 Min PRN Briseyda Abrams PA-C      nystatin   Topical BID Briseyda Abrams [place]      OLANZapine  5 mg Oral HS Briseyda Abrams PA-C      ondansetron  4 mg Intravenous Q6H PRN Briseyda Abrams PA-C      pantoprazole  40 mg Oral BID AC Briseyda Abrams PA-C      senna  1 tablet Oral Daily Briseyda Abrams [place]  tiZANidine  4 mg Oral Q8H PRN Frann Severe, PA-C      torsemide  40 mg Oral BID Frantz Mark MD          Today, Patient Was Seen By: Donnelly Closs, MD    ** Please Note: Dictation voice to text software may have been used in the creation of this document   **

## 2022-07-18 ENCOUNTER — APPOINTMENT (INPATIENT)
Dept: DIALYSIS | Facility: HOSPITAL | Age: 62
DRG: 291 | End: 2022-07-18
Payer: COMMERCIAL

## 2022-07-18 LAB
ANION GAP SERPL CALCULATED.3IONS-SCNC: 11 MMOL/L (ref 4–13)
BASOPHILS # BLD AUTO: 0.05 THOUSANDS/ΜL (ref 0–0.1)
BASOPHILS NFR BLD AUTO: 1 % (ref 0–1)
BUN SERPL-MCNC: 75 MG/DL (ref 5–25)
CALCIUM SERPL-MCNC: 7.9 MG/DL (ref 8.3–10.1)
CHLORIDE SERPL-SCNC: 100 MMOL/L (ref 100–108)
CO2 SERPL-SCNC: 28 MMOL/L (ref 21–32)
CREAT SERPL-MCNC: 3.33 MG/DL (ref 0.6–1.3)
EOSINOPHIL # BLD AUTO: 0.34 THOUSAND/ΜL (ref 0–0.61)
EOSINOPHIL NFR BLD AUTO: 5 % (ref 0–6)
ERYTHROCYTE [DISTWIDTH] IN BLOOD BY AUTOMATED COUNT: 14.9 % (ref 11.6–15.1)
GFR SERPL CREATININE-BSD FRML MDRD: 14 ML/MIN/1.73SQ M
GLUCOSE SERPL-MCNC: 133 MG/DL (ref 65–140)
GLUCOSE SERPL-MCNC: 147 MG/DL (ref 65–140)
GLUCOSE SERPL-MCNC: 157 MG/DL (ref 65–140)
GLUCOSE SERPL-MCNC: 188 MG/DL (ref 65–140)
GLUCOSE SERPL-MCNC: 276 MG/DL (ref 65–140)
HCT VFR BLD AUTO: 26.2 % (ref 34.8–46.1)
HGB BLD-MCNC: 8.2 G/DL (ref 11.5–15.4)
IMM GRANULOCYTES # BLD AUTO: 0.01 THOUSAND/UL (ref 0–0.2)
IMM GRANULOCYTES NFR BLD AUTO: 0 % (ref 0–2)
LYMPHOCYTES # BLD AUTO: 1.74 THOUSANDS/ΜL (ref 0.6–4.47)
LYMPHOCYTES NFR BLD AUTO: 27 % (ref 14–44)
MCH RBC QN AUTO: 31.4 PG (ref 26.8–34.3)
MCHC RBC AUTO-ENTMCNC: 31.3 G/DL (ref 31.4–37.4)
MCV RBC AUTO: 100 FL (ref 82–98)
MONOCYTES # BLD AUTO: 0.6 THOUSAND/ΜL (ref 0.17–1.22)
MONOCYTES NFR BLD AUTO: 9 % (ref 4–12)
NEUTROPHILS # BLD AUTO: 3.65 THOUSANDS/ΜL (ref 1.85–7.62)
NEUTS SEG NFR BLD AUTO: 58 % (ref 43–75)
NRBC BLD AUTO-RTO: 0 /100 WBCS
PLATELET # BLD AUTO: 193 THOUSANDS/UL (ref 149–390)
PMV BLD AUTO: 10.4 FL (ref 8.9–12.7)
POTASSIUM SERPL-SCNC: 3.3 MMOL/L (ref 3.5–5.3)
RBC # BLD AUTO: 2.61 MILLION/UL (ref 3.81–5.12)
SODIUM SERPL-SCNC: 139 MMOL/L (ref 136–145)
WBC # BLD AUTO: 6.39 THOUSAND/UL (ref 4.31–10.16)

## 2022-07-18 PROCEDURE — 85025 COMPLETE CBC W/AUTO DIFF WBC: CPT | Performed by: INTERNAL MEDICINE

## 2022-07-18 PROCEDURE — 82948 REAGENT STRIP/BLOOD GLUCOSE: CPT

## 2022-07-18 PROCEDURE — 80048 BASIC METABOLIC PNL TOTAL CA: CPT | Performed by: INTERNAL MEDICINE

## 2022-07-18 PROCEDURE — 99232 SBSQ HOSP IP/OBS MODERATE 35: CPT | Performed by: INTERNAL MEDICINE

## 2022-07-18 PROCEDURE — 5A1D70Z PERFORMANCE OF URINARY FILTRATION, INTERMITTENT, LESS THAN 6 HOURS PER DAY: ICD-10-PCS | Performed by: INTERNAL MEDICINE

## 2022-07-18 RX ORDER — TORSEMIDE 20 MG/1
80 TABLET ORAL DAILY
Status: DISCONTINUED | OUTPATIENT
Start: 2022-07-19 | End: 2022-07-19 | Stop reason: HOSPADM

## 2022-07-18 RX ADMIN — ALLOPURINOL 300 MG: 100 TABLET ORAL at 08:15

## 2022-07-18 RX ADMIN — PANTOPRAZOLE SODIUM 40 MG: 40 TABLET, DELAYED RELEASE ORAL at 17:51

## 2022-07-18 RX ADMIN — INSULIN LISPRO 5 UNITS: 100 INJECTION, SOLUTION INTRAVENOUS; SUBCUTANEOUS at 12:30

## 2022-07-18 RX ADMIN — HEPARIN SODIUM 5000 UNITS: 5000 INJECTION INTRAVENOUS; SUBCUTANEOUS at 21:24

## 2022-07-18 RX ADMIN — HEPARIN SODIUM 5000 UNITS: 5000 INJECTION INTRAVENOUS; SUBCUTANEOUS at 05:51

## 2022-07-18 RX ADMIN — LIDOCAINE 1 PATCH: 50 PATCH CUTANEOUS at 08:13

## 2022-07-18 RX ADMIN — LEVOTHYROXINE SODIUM 50 MCG: 50 TABLET ORAL at 05:51

## 2022-07-18 RX ADMIN — HYDROCODONE BITARTRATE AND ACETAMINOPHEN 1 TABLET: 5; 325 TABLET ORAL at 10:04

## 2022-07-18 RX ADMIN — HEPARIN SODIUM 5000 UNITS: 5000 INJECTION INTRAVENOUS; SUBCUTANEOUS at 13:16

## 2022-07-18 RX ADMIN — INSULIN LISPRO 2 UNITS: 100 INJECTION, SOLUTION INTRAVENOUS; SUBCUTANEOUS at 12:30

## 2022-07-18 RX ADMIN — CITALOPRAM HYDROBROMIDE 40 MG: 20 TABLET ORAL at 08:16

## 2022-07-18 RX ADMIN — INSULIN LISPRO 6 UNITS: 100 INJECTION, SOLUTION INTRAVENOUS; SUBCUTANEOUS at 21:24

## 2022-07-18 RX ADMIN — PANTOPRAZOLE SODIUM 40 MG: 40 TABLET, DELAYED RELEASE ORAL at 05:51

## 2022-07-18 RX ADMIN — ATORVASTATIN CALCIUM 40 MG: 40 TABLET, FILM COATED ORAL at 08:16

## 2022-07-18 RX ADMIN — MELATONIN TAB 3 MG 6 MG: 3 TAB at 21:26

## 2022-07-18 RX ADMIN — CLOPIDOGREL BISULFATE 75 MG: 75 TABLET ORAL at 08:16

## 2022-07-18 RX ADMIN — SENNOSIDES 8.6 MG: 8.6 TABLET, FILM COATED ORAL at 08:16

## 2022-07-18 RX ADMIN — NYSTATIN: 100000 POWDER TOPICAL at 08:21

## 2022-07-18 RX ADMIN — INSULIN GLARGINE 40 UNITS: 100 INJECTION, SOLUTION SUBCUTANEOUS at 21:23

## 2022-07-18 RX ADMIN — ISOSORBIDE MONONITRATE 60 MG: 60 TABLET, EXTENDED RELEASE ORAL at 08:19

## 2022-07-18 RX ADMIN — HYDROCODONE BITARTRATE AND ACETAMINOPHEN 1 TABLET: 5; 325 TABLET ORAL at 19:45

## 2022-07-18 RX ADMIN — GABAPENTIN 400 MG: 400 CAPSULE ORAL at 08:16

## 2022-07-18 RX ADMIN — TORSEMIDE 40 MG: 20 TABLET ORAL at 08:19

## 2022-07-18 RX ADMIN — LOSARTAN POTASSIUM 25 MG: 25 TABLET, FILM COATED ORAL at 08:19

## 2022-07-18 RX ADMIN — BISOPROLOL FUMARATE 2.5 MG: 5 TABLET ORAL at 08:19

## 2022-07-18 RX ADMIN — OLANZAPINE 5 MG: 5 TABLET, FILM COATED ORAL at 21:26

## 2022-07-18 RX ADMIN — ASPIRIN 81 MG: 81 TABLET, COATED ORAL at 08:16

## 2022-07-18 RX ADMIN — INSULIN LISPRO 5 UNITS: 100 INJECTION, SOLUTION INTRAVENOUS; SUBCUTANEOUS at 17:52

## 2022-07-18 RX ADMIN — NYSTATIN: 100000 POWDER TOPICAL at 17:49

## 2022-07-18 RX ADMIN — ISOSORBIDE MONONITRATE 30 MG: 30 TABLET, EXTENDED RELEASE ORAL at 17:48

## 2022-07-18 RX ADMIN — AMLODIPINE BESYLATE 2.5 MG: 2.5 TABLET ORAL at 08:19

## 2022-07-18 RX ADMIN — INSULIN LISPRO 2 UNITS: 100 INJECTION, SOLUTION INTRAVENOUS; SUBCUTANEOUS at 17:52

## 2022-07-18 RX ADMIN — INSULIN LISPRO 5 UNITS: 100 INJECTION, SOLUTION INTRAVENOUS; SUBCUTANEOUS at 08:18

## 2022-07-18 NOTE — PROGRESS NOTES
[address] [facility]  Progress Note - Kyrie Nina 1960, 63 y o  female MRN: [id number]  [address] [place] [address] 206-01 Encounter: [id number]  Primary Care Provider: CHERELLE Villanueva   Date and time admitted to hospital: 7/6/2022  4:59 PM    * 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 heart failure ([facility] )  Assessment & Plan  · CHF with worsening renal function now on hemodialysis  · Overall improved  · Continue volume control via HD and torsemide 80 mg daily  · Close follow-up with Cardiology after discharge      NSVT (nonsustained ventricular tachycardia) ([facility] )  Assessment & Plan  · Known history of cardiomyopathy with EF 30%  · Cardiology recommendations reviewed  · Defer plan for LifeVest placement to Cardiology  · Continue beta-blocker    Mixed hyperlipidemia  Assessment & Plan  · Continue Lipitor 40 mg daily     Acquired hypothyroidism  Assessment & Plan  · Continue levothyroxine 50 mcg daily    ESRD (end stage renal disease) ([facility] )  Assessment & Plan  · With progression of renal disease now deemed to be end-stage  · Status post PermCath placement on 07/15/2022  · Tolerated 1st day of hemodialysis on 07/16/2022  · Ongoing management per Nephrology  · Will need outpatient hemodialysis chair set up prior to discharge    Anemia  Assessment & Plan  Anemia secondary to renal disease  Stable  Monitor and transfuse as needed        CAD (coronary artery disease)  Assessment & Plan  · Stable  · Known history of CAD with previous stenting in 2012  · Status post recent cardiac catheterization in July 1, 2022  · Status post stenting in 2012, then revised in 2017  · Continue aspirin, Plavix, bisoprolol 2 5 mg daily, and Imdur 60 mg daily Lipitor 40 mg daily      Type 2 diabetes mellitus with stage 5 chronic kidney disease not on chronic dialysis, with long-term current use of insulin [facility])  Assessment & Plan  Lab Results   Component Value Date    HGBA1C 7 3 (A) 03/29/2022 · Continue basal bolus protocol plus sliding scale    VTE Pharmacologic Prophylaxis: heparin     Patient Centered Rounds:  Patient care rounds were performed with nursing    Time Spent for Care: 30  More than 50% of total time spent on counseling and coordination of care as described above  Current Length of Stay: 12 day(s)    Current Patient Status: Inpatient   Certification Statement: The patient will continue to require additional inpatient hospital stay due to awaiting hemodialysis seat    Discharge Plan:  Awaiting hemodialysis seat    Code Status: Level 1 - Full Code      Subjective:   Patient seen and evaluated at bedside  No overnight events  No complaints  Objective:     Vitals:   Temp (24hrs), Av 9 °F (36 6 °C), Min:97 5 °F (36 4 °C), Max:98 1 °F (36 7 °C)    Temp:  [97 5 °F (36 4 °C)-98 1 °F (36 7 °C)] 98 1 °F (36 7 °C)  HR:  [61-71] 61  Resp:  [18-20] 18  BP: (133-135)/(59) 135/59  SpO2:  [94 %] 94 %  Body mass index is 40 73 kg/m²  Input and Output Summary (last 24 hours): Intake/Output Summary (Last 24 hours) at 2022 1433  Last data filed at 2022 0819  Gross per 24 hour   Intake --   Output 2150 ml   Net -2150 ml       Physical Exam:     Physical Exam  Vitals reviewed  Constitutional:       General: She is not in acute distress  Appearance: She is well-developed  She is not ill-appearing, toxic-appearing or diaphoretic  HENT:      Head: Normocephalic and atraumatic  Mouth/Throat:      Mouth: Mucous membranes are moist    Eyes:      General: No scleral icterus  Conjunctiva/sclera: Conjunctivae normal    Cardiovascular:      Rate and Rhythm: Normal rate and regular rhythm  Heart sounds: Normal heart sounds  Pulmonary:      Effort: Pulmonary effort is normal  No respiratory distress  Breath sounds: Normal breath sounds  No wheezing or rales  Abdominal:      General: There is no distension  Palpations: Abdomen is soft        Tenderness: There is no abdominal tenderness  There is no guarding or rebound  Musculoskeletal:         General: No swelling, tenderness or deformity  Skin:     General: Skin is warm and dry  Neurological:      General: No focal deficit present  Mental Status: She is alert  Mental status is at baseline  Psychiatric:         Mood and Affect: Mood normal          Behavior: Behavior normal          Thought Content: Thought content normal          Judgment: Judgment normal          Additional Data:     Labs:  I have reviewed pertinent results     Results from last 7 days   Lab Units 07/18/22  0525   WBC Thousand/uL 6 39   HEMOGLOBIN g/dL 8 2*   HEMATOCRIT % 26 2*   PLATELETS Thousands/uL 193   NEUTROS PCT % 58   LYMPHS PCT % 27   MONOS PCT % 9   EOS PCT % 5     Results from last 7 days   Lab Units 07/18/22  0525   SODIUM mmol/L 139   POTASSIUM mmol/L 3 3*   CHLORIDE mmol/L 100   CO2 mmol/L 28   BUN mg/dL 75*   CREATININE mg/dL 3 33*   ANION GAP mmol/L 11   CALCIUM mg/dL 7 9*   GLUCOSE RANDOM mg/dL 147*         Results from last 7 days   Lab Units 07/18/22  1208 07/18/22  0730 07/17/22  2116 07/17/22  1554 07/17/22  1145 07/17/22  0735 07/16/22  2110 07/16/22  1622 07/16/22  1150 07/16/22  0734 07/15/22  2130 07/15/22  1549   POC GLUCOSE mg/dl 188* 133 279* 208* 311* 167* 279* 146* 241* 232* 219* 232*                 Imaging: I have reviewed pertinent imaging       Recent Cultures (last 7 days):           Last 24 Hours Medication List:   Current Facility-Administered Medications   Medication Dose Route Frequency Provider Last Rate    acetaminophen  650 mg Oral Q6H PRN Aurelio Rodriguez PA-C      allopurinol  300 mg Oral Daily Aurelio Rodriguez PA-C      amLODIPine  2 5 mg Oral BID Delfina Thomas MD      aspirin  81 mg Oral Daily Aurelio Rodriguez PA-C      atorvastatin  40 mg Oral Daily Aurelio Rodriguez PA-C      bisoprolol  2 5 mg Oral Daily Bere Thomas MD      citalopram  40 mg Oral Daily Aurelio Rodriguez PA-C      clopidogrel  75 mg Oral Daily Frann Severe, PA-C      diphenhydrAMINE  50 mg Oral Q6H PRN CHERELLE Bertrand      fentanyl citrate (PF)   Intravenous Code/Trauma/Sedation Med Nadiya Frausto MD      gabapentin  400 mg Oral Daily Frann Severe, PA-C      heparin (porcine)  5,000 Units Subcutaneous [facility] Frann Severe, [place]      HYDROcodone-acetaminophen  1 tablet Oral TID PRN Frann Severe, PA-C      insulin glargine  40 Units Subcutaneous HS Donnelly Closs, MD      insulin lispro  2-12 Units Subcutaneous HS Frann Severe, PA-C      insulin lispro  2-12 Units Subcutaneous TID AC Bere Gaspar MD      insulin lispro  5 Units Subcutaneous TID With Meals Frann Severe, PA-C      isosorbide mononitrate  30 mg Oral QPM Delfina Thomas MD      isosorbide mononitrate  60 mg Oral Daily Florian Muñoz MD      levothyroxine  50 mcg Oral Early Morning Frann Severe, PA-C      lidocaine  1 patch Topical Daily Frann Severe, PA-C      losartan  25 mg Oral Daily CHERELLE Alarcon      melatonin  6 mg Oral HS Debi Trimble Null, PA-C      midazolam    Code/Trauma/Sedation Med Nadiya Frausto MD      nitroglycerin  0 4 mg Sublingual Q5 Min PRN [facility] Severe, PA-C      nystatin   Topical BID Frann Severe, [place]      OLANZapine  5 mg Oral HS [facility] Severe, PA-C      ondansetron  4 mg Intravenous Q6H PRN Frann Severe, PA-C      pantoprazole  40 mg Oral BID AC [facility] Severe, PA-C      senna  1 tablet Oral Daily Frann Severe, [place]      tiZANidine  4 mg Oral Q8H PRN Frann Severe, [place]      [START ON 7/19/2022] torsemide  80 mg Oral Daily CHERELLE May          Today, Patient Was Seen By: Tyrese Kong DO    ** Please Note: Dictation voice to text software may have been used in the creation of this document   **

## 2022-07-18 NOTE — PROGRESS NOTES
Cardiology         Progress Note - Cardiology   Kyrie Nina 63 y o  female MRN: [id number]  Unit/Bed#: [place] [facility] 2 -01 Encounter: [id number]          Assessment/Recommendations/Discussion:   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 heart failure  Ischemic cardiomyopathy              - LVEF 30%, moderate LVH, mild LA dilatation, AV sclerosis, trace AR, mild MR, MV sclerosis, mild TR, June 2022  Coronary artery disease with multiple PCIs in the past              - Worsening cardiomyopathy due to underlying progression of CAD, s/p JANET-mLCx w/ small 95% pRCA and diffuse moderate disease of LAD, July 2022               - S/P -pRCA, August 2021               - S/P JANET-mLAD, JANET-D1 and JANET-LCx, December 2012  Acute kidney injury on chronic kidney disease stage IV with progression to ESRD  Nonsustained ventricular tachycardia, 8 beat run via telemetry  Hypertension  Dyslipidemia              - lipid panel 6/24/22: Cholesterol 120, triglycerides 138, HDL 42, LDL 50  Morbid obesity  Anemia of chronic kidney disease  Type 2 diabetes mellitus  Polycystic ovarian syndrome  Neurogenic bladder with suprapubic catheter in place  Obstructive sleep apnea, not on CPAP  Hx syncope with orthostatic hypotension  Hypothyroid      · PermCath in place, patient tolerating dialysis    Will defer volume blood pressure management to Nephrology team management  · Currently tolerating current antihypertensive regimen with bisoprolol, isosorbide, amlodipine  · Continue dual anti-platelet therapy and high-dose atorvastatin for CAD  · Will schedule outpatient follow-up with Dr Fransisco Goldstein   · Cardiology will sign off, please call if questions                Subjective:  Patient seen and examined, no complaints                Physical Exam:  GEN:  NAD  HEENT:  MMM, NCAT, pink conjunctiva, EOMI, nonicteric sclera  CV:  NO JVD/HJR, RR, NO M/R/G, +S1/S2, NO PARASTERNAL HEAVE/THRILL, NO LE EDEMA, NO HEPATIC SYSTOLIC PULSATION, WARM EXTREMITIES  RESP:  CTAB/L  ABD:  SOFT, NT, NO GROSS ORGANOMEGALY        Vitals:   /59 (BP Location: Left arm)   Pulse 61   Temp 98 1 °F (36 7 °C) (Oral)   Resp 18   Ht 5' 8" (1 727 m)   Wt 121 kg (267 lb 13 7 oz)   SpO2 94%   BMI 40 73 kg/m²   Vitals:    07/17/22 0550 07/18/22 0532   Weight: 121 kg (267 lb 3 2 oz) 121 kg (267 lb 13 7 oz)       Intake/Output Summary (Last 24 hours) at 7/18/2022 1106  Last data filed at 7/18/2022 0819  Gross per 24 hour   Intake --   Output 2150 ml   Net -2150 ml       TELEMETRY:  Off  Lab Results:  Results from last 7 days   Lab Units 07/18/22  0525   WBC Thousand/uL 6 39   HEMOGLOBIN g/dL 8 2*   HEMATOCRIT % 26 2*   PLATELETS Thousands/uL 193     Results from last 7 days   Lab Units 07/18/22  0525   POTASSIUM mmol/L 3 3*   CHLORIDE mmol/L 100   CO2 mmol/L 28   BUN mg/dL 75*   CREATININE mg/dL 3 33*   CALCIUM mg/dL 7 9*     Results from last 7 days   Lab Units 07/18/22  0525   POTASSIUM mmol/L 3 3*   CHLORIDE mmol/L 100   CO2 mmol/L 28   BUN mg/dL 75*   CREATININE mg/dL 3 33*   CALCIUM mg/dL 7 9*           Medications:    Current Facility-Administered Medications:     acetaminophen (TYLENOL) tablet 650 mg, 650 mg, Oral, Q6H PRN, SETH De Leon, 650 mg at 07/16/22 0410    allopurinol (ZYLOPRIM) tablet 300 mg, 300 mg, Oral, Daily, SETH De Leon, 300 mg at 07/18/22 0815    amLODIPine (NORVASC) tablet 2 5 mg, 2 5 mg, Oral, BID, Delfina Thomas MD, 2 5 mg at 07/18/22 0819    aspirin (ECOTRIN LOW STRENGTH) EC tablet 81 mg, 81 mg, Oral, Daily, SETH De Leon, 81 mg at 07/18/22 0816    atorvastatin (LIPITOR) tablet 40 mg, 40 mg, Oral, Daily, SETH De Leon, 40 mg at 07/18/22 0816    bisoprolol (ZEBETA) tablet 2 5 mg, 2 5 mg, Oral, Daily, Bere Mejia MD, 2 5 mg at 07/18/22 0819    citalopram (CeleXA) tablet 40 mg, 40 mg, Oral, Daily, SETH De Leon, 40 mg at 07/18/22 0816    clopidogrel (PLAVIX) tablet 75 mg, 75 mg, Oral, Daily, Mikel Richardson PA-C, 75 mg at 07/18/22 0816    diphenhydrAMINE (BENADRYL) tablet 50 mg, 50 mg, Oral, Q6H PRN, CHERELLE Roberts, 50 mg at 07/17/22 2208    fentanyl citrate (PF) 100 MCG/2ML, , Intravenous, Code/Trauma/Sedation Med, Mildred Salcedo MD, 50 mcg at 07/15/22 1459    gabapentin (NEURONTIN) capsule 400 mg, 400 mg, Oral, Daily, Mikel Richardson PA-C, 400 mg at 07/18/22 0816    heparin (porcine) subcutaneous injection 5,000 Units, 5,000 Units, Subcutaneous, Q8H [address], 5,000 Units at 07/18/22 0551 **AND** [CANCELED] Platelet count, , , Once, Mikel Richardson PA-C    HYDROcodone-acetaminophen [facility]) 5-325 mg per tablet 1 tablet, 1 tablet, Oral, TID PRN, Mikel Richardson PA-C, 1 tablet at 07/18/22 1004    insulin glargine (LANTUS) subcutaneous injection 40 Units 0 4 mL, 40 Units, Subcutaneous, HS, Majorie Boxer, MD, 40 Units at 07/17/22 2215    insulin lispro (HumaLOG) 100 units/mL subcutaneous injection 2-12 Units, 2-12 Units, Subcutaneous, HS, Mikel Richardson PA-C, 6 Units at 07/17/22 2218    insulin lispro (HumaLOG) 100 units/mL subcutaneous injection 2-12 Units, 2-12 Units, Subcutaneous, TID AC, 4 Units at 07/17/22 1721 **AND** Fingerstick Glucose (POCT), , , 4x Daily AC and at bedtime, Bere Mejia MD    insulin lispro (HumaLOG) 100 units/mL subcutaneous injection 5 Units, 5 Units, Subcutaneous, TID With Meals, Mikel Richardson PA-C, 5 Units at 07/18/22 0818    isosorbide mononitrate (IMDUR) 24 hr tablet 30 mg, 30 mg, Oral, QPM, Delfina Thomas MD, 30 mg at 07/17/22 1721    isosorbide mononitrate (IMDUR) 24 hr tablet 60 mg, 60 mg, Oral, Daily, Claudio Schmidt MD, 60 mg at 07/18/22 0819    levothyroxine tablet 50 mcg, 50 mcg, Oral, Early Morning, Mikel Richardson PA-C, 50 mcg at 07/18/22 0551    lidocaine (LIDODERM) 5 % patch 1 patch, 1 patch, Topical, Daily, Mikel Richardson PA-C, 1 patch at 07/18/22 0813    losartan (COZAAR) tablet 25 mg, 25 mg, Oral, Daily, CHERELLE Alarcon, 25 mg at 07/18/22 0819    melatonin tablet 6 mg, 6 mg, Oral, HS, Debi House, PA-C, 6 mg at 07/17/22 2212    midazolam (VERSED) injection, , , Code/Trauma/Sedation Med, Richard Pitts MD, 1 mg at 07/15/22 1459    nitroglycerin (NITROSTAT) SL tablet 0 4 mg, 0 4 mg, Sublingual, Q5 Min PRN, Sharran Median, PA-C, 0 4 mg at 07/09/22 1930    nystatin (MYCOSTATIN) powder, , Topical, BID, Sharran Median, PA-C, Given at 07/18/22 3584    OLANZapine (ZyPREXA) tablet 5 mg, 5 mg, Oral, HS, Sharran Median, PA-C, 5 mg at 07/17/22 2208    ondansetron (ZOFRAN) injection 4 mg, 4 mg, Intravenous, Q6H PRN, Sharran Median, PA-C    pantoprazole (PROTONIX) EC tablet 40 mg, 40 mg, Oral, BID AC, Sharran Median, PA-C, 40 mg at 07/18/22 0551    senna (SENOKOT) tablet 8 6 mg, 1 tablet, Oral, Daily, Sharran Median, PA-C, 8 6 mg at 07/18/22 0816    tiZANidine (ZANAFLEX) tablet 4 mg, 4 mg, Oral, Q8H PRN, Sharran Median, PA-C, 4 mg at 07/13/22 0045    torsemide (DEMADEX) tablet 40 mg, 40 mg, Oral, BID, Javad Rahman MD, 40 mg at 07/18/22 8497    This note was completed in part utilizing M-Modal Fluency Direct Software  Grammatical errors, random word insertions, spelling mistakes, and incomplete sentences may be an occasional consequence of this system secondary to software limitations, ambient noise, and hardware issues  If you have any questions or concerns about the content, text, or information contained within the body of this dictation, please contact the provider for clarification

## 2022-07-18 NOTE — PROGRESS NOTES
Follow up Consultation    Nephrology   Latoya Nida 63 y o  female MRN: [id number]  Unit/Bed#: [facility] [address] 2 [address] 87 206-01 Encounter: [id number]      Physician Requesting Consult: Padmini Cutler DO        ASSESSMENT/PLAN:  61-year-old female with multiple comorbidities including uncontrolled diabetes, CKD stage 4, CHF, CAD, chronic suprapubic catheter admitted with worsening shortness of breath  Nephrology following for acute kidney injury  Acute kidney injury on CKD stage 4 with progression to ESRD:  ALFRED multifactorial most likely secondary to cardiorenal syndrome with volume overload plus recent episode of acute kidney injury in June 2022 + some component of CKD progression  After review of records [facility] as well as Care everywhere it appears that the patient has a baseline Creatinine of 2 2-2 9 mg/dL  patient was admitted with a creatinine of 2 94 mg/dL on 07/06/2022  patient's creatinine today is at 3 33 mg/dL  Change torsemide to 80 mg p o  q day  Status post PermCath placement right IJ by IR on 07/15/2022  Status post 1st dialysis treatment on 07/16/2022  2nd treatment on 07/18/2022  For now will place on MWF dialysis schedule while awaiting outpatient dialysis placement  check BMP in a m  Patient interested in pursuing possibly home hemodialysis upon discharge agreeable for getting AV fistula placed as an outpatient  Place on a renal diet when allowed diet order  Avoid nephrotoxins, adjust meds to appropriate GFR  Strict I/O  Daily weights  Urinary retention protocol if patient does not have a Cam[place]  Most likely has underlying CKD secondary to diabetic kidney disease plus obesity did have a filtration plus prior episode of acute kidney injury non dialysis requiring plus cardiorenal syndrome plus obesity related FSGS  will need to set up patient for follow up with Nephrology as an outpatient post hospitalization    for nephrology as an outpatient patient follows up with Dr Gagan Cuba  Follow up with case management once all outpatient arrangements are made for patient to go to [address] [place] [address] patient is stable for renal standpoint for discharge  Decrease gabapentin dosage based on GFR    Blood pressure/hypertension:  current medications:  Norvasc 2 5 mg p o  b i d , bisoprolol 2 5 mg p o  Q day, Imdur 30 mg p o  Q day, losartan 25 mg p o  q day, torsemide 40 mg p o  b i d   recommendations:  Change torsemide to 80 mg p o  q day for now  Optimize hemodynamics  Maintain MAP > 65mmHg  Avoid BP fluctuations  H/H/anemia:  most recent hemoglobin at 8 2 grams/deciliter  maintain hemoglobin greater than 8 grams/deciliter  Most recent iron studies from 07/16/2022 showing iron 40 ferritin 59 T sat 17%  We will start on Venofer 100 mg with dialysis total of 10 doses  Acid-base electrolytes:    Electrolytes:      Acid-base:    Most recent bicarb at 28 stable    Other medical problems:  Proteinuria: Most recent microalbumin creatinine ratio 3 6 g as of 06/29/2022  Vitamin-D deficiency:  Most recent vitamin-D level 21 1  Most recent intact  2 as of 07/16/2022  Started on ergocalciferol 02793 units p o  weekly  Follow by [facility] a dialysis unit  Diabetes:  Management per primary team   On insulin  Most recent A1c 7 3%  CHF:  Management per primary team   Follow-up with cardiology most recent echo with EF of 38%  Continue torsemide Check daily weights  chronic suprapubic Camargo:  Follow-up with Urology  We will follow up as an outpatient when patient's urine output declines we will discuss with Urology when catheter can be removed  Mrs  Sent to Urology Dr Junaid Flowers with regards to plan for suprapubic catheter  Thanks for the consult  Will continue to follow  Please call with questions/ concerns    Above-mentioned orders and Plan in terms of acute kidney injury was discussed with the team via tiger text    Xenia Samuels MD, FASN, 7/18/2022, 2:49 PM              Objective :   Patient seen and examined in her room earlier exam hemodynamically stable remains afebrile status post 1st dialysis treatment Saturday awaiting treatment later today  Feels well  Urine output close to 2 6 L last 24 hours and thus far today 1 2 L  Awaiting outpatient discharge dialysis arrangements  PHYSICAL EXAM  /59 (BP Location: Left arm)   Pulse 61   Temp 98 1 °F (36 7 °C) (Oral)   Resp 18   Ht 5' 8" (1 727 m)   Wt 121 kg (267 lb 13 7 oz)   SpO2 94%   BMI 40 73 kg/m²   Temp (24hrs), Av 9 °F (36 6 °C), Min:97 5 °F (36 4 °C), Max:98 1 °F (36 7 °C)        Intake/Output Summary (Last 24 hours) at 2022 1449  Last data filed at 2022 1430  Gross per 24 hour   Intake 200 ml   Output 2150 ml   Net -1950 ml       I/O last 24 hours: In: 200 [I V :200]  Out: 3750 [Urine:3750]      Current Weight: Weight - Scale: 121 kg (267 lb 13 7 oz)  First Weight: Weight - Scale: 135 kg (298 lb 1 oz)  Physical Exam  Vitals and nursing note reviewed  Constitutional:       General: She is not in acute distress  Appearance: Normal appearance  She is obese  She is not ill-appearing, toxic-appearing or diaphoretic  HENT:      Head: Normocephalic and atraumatic  Mouth/Throat:      Mouth: Mucous membranes are moist       Pharynx: Oropharynx is clear  No oropharyngeal exudate  Eyes:      General: No scleral icterus  Conjunctiva/sclera: Conjunctivae normal    Neck:      Comments: Right IJ PermCath  Cardiovascular:      Rate and Rhythm: Normal rate  Heart sounds: Normal heart sounds  No friction rub  Pulmonary:      Effort: Pulmonary effort is normal  No respiratory distress  Breath sounds: Normal breath sounds  No stridor  No wheezing  Abdominal:      General: There is no distension  Palpations: Abdomen is soft  There is no mass  Tenderness: There is no abdominal tenderness  Comments: Suprapubic catheter   Musculoskeletal:         General: Swelling present        Cervical back: Normal range of motion and neck supple  No rigidity  Comments: +1 edema bilateral lower extremities   Skin:     General: Skin is warm  Coloration: Skin is not jaundiced  Neurological:      General: No focal deficit present  Mental Status: She is alert and oriented to person, place, and time  Psychiatric:         Mood and Affect: Mood normal          Behavior: Behavior normal              Review of Systems   Constitutional: Negative for chills, fatigue and fever  HENT: Negative for congestion  Respiratory: Negative for cough, shortness of breath and wheezing  Cardiovascular: Positive for leg swelling  Gastrointestinal: Negative for abdominal pain, constipation, diarrhea, nausea and vomiting  Genitourinary: Negative for hematuria  Musculoskeletal: Negative for back pain  Neurological: Negative for dizziness and headaches  Psychiatric/Behavioral: Negative for agitation and confusion  All other systems reviewed and are negative        Scheduled Meds:  Current Facility-Administered Medications   Medication Dose Route Frequency Provider Last Rate    acetaminophen  650 mg Oral Q6H PRN LANRE Ibarra-PONCHO      allopurinol  300 mg Oral Daily LANRE Ibarra-PONCHO      amLODIPine  2 5 mg Oral BID Delfina Thomas MD      aspirin  81 mg Oral Daily Alli GoldSETH julian      atorvastatin  40 mg Oral Daily [place], [place]      bisoprolol  2 5 mg Oral Daily Bere Maria Isabel James MD      citalopram  40 mg Oral Daily Alli GoldSETH julian      clopidogrel  75 mg Oral Daily [place], [place]      diphenhydrAMINE  50 mg Oral Q6H PRN CHERELLE Mabry      fentanyl citrate (PF)   Intravenous Code/Trauma/Sedation Med Gilmer Tobar MD      gabapentin  400 mg Oral Daily Alli Sotomayor PA-C      heparin (porcine)  5,000 Units Subcutaneous [place], [place]      HYDROcodone-acetaminophen  1 tablet Oral TID PRN Alli Sotomayor PA-C      insulin glargine  40 Units Subcutaneous HS [place] Given Megha Silva MD      insulin lispro  2-12 Units Subcutaneous HS SETH De Leon      insulin lispro  2-12 Units Subcutaneous TID AC Bere Burrows MD      insulin lispro  5 Units Subcutaneous TID With Meals SETH De Leon      isosorbide mononitrate  30 mg Oral QPM Delfina Thomas MD      isosorbide mononitrate  60 mg Oral Daily Heidi Hammer MD      levothyroxine  50 mcg Oral Early Morning SETH De Leon      lidocaine  1 patch Topical Daily SETH De Leon      losartan  25 mg Oral Daily CHERELLE Alarcon      melatonin  6 mg Oral HS Debi Ríos PA-C      midazolam    Code/Trauma/Sedation Med Tae Santana MD      nitroglycerin  0 4 mg Sublingual Q5 Min PRN [place], PA-C      nystatin   Topical BID [place], [place]      OLANZapine  5 mg Oral HS [place], PA-C      ondansetron  4 mg Intravenous Q6H PRN SETH De Leon      pantoprazole  40 mg Oral BID [facility], [facility]      senna  1 tablet Oral Daily [place], [place]      tiZANidine  4 mg Oral Q8H PRN [place], [place]      [START ON 7/19/2022] torsemide  80 mg Oral Daily CHERELLE Ogden         PRN Meds:   acetaminophen    diphenhydrAMINE    fentanyl citrate (PF)    HYDROcodone-acetaminophen    midazolam    nitroglycerin    ondansetron    tiZANidine    Continuous Infusions:       Invasive Devices: Invasive Devices  Report    Peripheral Intravenous Line  Duration           Peripheral IV Dorsal (posterior); Right Hand -- days          Hemodialysis Catheter  Duration           HD Permanent Double Catheter 2 days          Drain  Duration           Suprapubic Catheter Non-latex 16 Fr  285 days                  LABORATORY:    Results from last 7 days   Lab Units 07/18/22  0525 07/17/22  0450 07/16/22  0500 07/15/22  0532 07/14/22  0504 07/13/22  0517 07/12/22  0524   WBC Thousand/uL 6 39  --  6 61 5 96 6 47 6 49  --    HEMOGLOBIN g/dL 8 2*  --  8 5* 8 3* 8 9* 8 2*  --    HEMATOCRIT % 26 2*  --  26 7* 26 7* 28 0* 25 6*  --    PLATELETS Thousands/uL 193  --  211 216 236 228  --    POTASSIUM mmol/L 3 3* 3 6 3 5 3 6 3 9 3 8 4 1   CHLORIDE mmol/L 100 103 103 104 103 102 102   CO2 mmol/L 28 28 29 28 29 28 29   BUN mg/dL 75* 65* 88* 90* 90* 93* 89*   CREATININE mg/dL 3 33* 3 20* 3 86* 3 56* 3 52* 3 48* 3 60*   CALCIUM mg/dL 7 9* 8 1* 8 4 8 5 8 5 8 3 8 1*   PHOSPHORUS mg/dL  --  4 1  --   --   --   --   --       rest all reviewed    RADIOLOGY:  IR tunneled dialysis catheter placement   Final Result by Jodi Monroe MD (07/18 0820)   Impression:   1  Successful ultrasound and fluoroscopically guided placement of a 24 cm Titan cath via the right internal jugular vein  The tip of the catheter is in the right atrium and may be used immediately  Workstation performed: [id number]         XR chest 1 view portable   Final Result by Tano Cabrera MD (07/07 2075)      Unchanged enlargement of the cardiomediastinal silhouette  Possible small bilateral pleural effusions  Decreased right basilar opacity  Workstation performed: [id number]           Rest all reviewed    Portions of the record may have been created with voice recognition software  Occasional wrong word or "sound a like" substitutions may have occurred due to the inherent limitations of voice recognition software  Read the chart carefully and recognize, using context, where substitutions have occurred  If you have any questions, please contact the dictating provider

## 2022-07-18 NOTE — ASSESSMENT & PLAN NOTE
· Known history of cardiomyopathy with EF 30%  · Cardiology recommendations reviewed  · Defer plan for LifeVest placement to Cardiology  · Continue beta-blocker

## 2022-07-18 NOTE — ASSESSMENT & PLAN NOTE
Lab Results   Component Value Date    HGBA1C 7 3 (A) 03/29/2022     · Continue basal bolus protocol plus sliding scale

## 2022-07-18 NOTE — ASSESSMENT & PLAN NOTE
· Stable  · Known history of CAD with previous stenting in 2012  · Status post recent cardiac catheterization in July 1, 2022  · Status post stenting in 2012, then revised in 2017  · Continue aspirin, Plavix, bisoprolol 2 5 mg daily, and Imdur 60 mg daily Lipitor 40 mg daily

## 2022-07-18 NOTE — PLAN OF CARE
Post-Dialysis RN Treatment Note    Blood Pressure:  Pre 113/59 mm/Hg  Post 124/68 mmHg   EDW  tbd    Weight:  Pre 122 9 kg   Post 122 kg   Mode of weight measurement: Bed Scale   Volume Removed  1000 ml    Treatment duration 150 minutes    NS given  No    Treatment shortened? No   Medications given during Rx None Reported   Estimated Kt/V  Not Applicable   Access type: Permacath/TDC   Access Issues: Yes, describe: intermittent high ap alarms especially when pt talking, reversed for improved flow    Report called to primary nurse   Yes     HD tx plan: 2 5 hrs removing 1L as korin  4K bath for K 3 3 7/18  Using Harmeet Cane placed 7/15       Problem: METABOLIC, FLUID AND ELECTROLYTES - ADULT  Goal: Electrolytes maintained within normal limits  Description: INTERVENTIONS:  - Monitor labs and assess patient for signs and symptoms of electrolyte imbalances  - Administer electrolyte replacement as ordered  - Monitor response to electrolyte replacements, including repeat lab results as appropriate  - Instruct patient on fluid and nutrition as appropriate  Outcome: Progressing     Problem: METABOLIC, FLUID AND ELECTROLYTES - ADULT  Goal: Fluid balance maintained  Description: INTERVENTIONS:  - Monitor labs   - Monitor I/O and WT  - Instruct patient on fluid and nutrition as appropriate  - Assess for signs & symptoms of volume excess or deficit  Outcome: Progressing

## 2022-07-18 NOTE — PLAN OF CARE
Problem: PAIN - ADULT  Goal: Verbalizes/displays adequate comfort level or baseline comfort level  Description: Interventions:  - Encourage patient to monitor pain and request assistance  - Assess pain using appropriate pain scale  - Administer analgesics based on type and severity of pain and evaluate response  - Implement non-pharmacological measures as appropriate and evaluate response  - Consider cultural and social influences on pain and pain management  - Notify physician/advanced practitioner if interventions unsuccessful or patient reports new pain  Outcome: Progressing     Problem: SAFETY ADULT  Goal: Patient will remain free of falls  Description: INTERVENTIONS:  - Educate patient/family on patient safety including physical limitations  - Instruct patient to call for assistance with activity   - Consult OT/PT to assist with strengthening/mobility   - Keep Call bell within reach  - Keep bed low and locked with side rails adjusted as appropriate  - Keep care items and personal belongings within reach  - Initiate and maintain comfort rounds  - Make Fall Risk Sign visible to staff  - Offer Toileting every  2 Hours, in advance of need  - Obtain necessary fall risk management equipmen  - Apply yellow socks and bracelet for high fall risk patients  - Consider moving patient to room near nurses station  Outcome: Progressing  Goal: Maintain or return to baseline ADL function  Description: INTERVENTIONS:  -  Assess patient's ability to carry out ADLs; assess patient's baseline for ADL function and identify physical deficits which impact ability to perform ADLs (bathing, care of mouth/teeth, toileting, grooming, dressing, etc )  - Assess/evaluate cause of self-care deficits   - Assess range of motion  - Assess patient's mobility; develop plan if impaired  - Assess patient's need for assistive devices and provide as appropriate  - Encourage maximum independence but intervene and supervise when necessary  - Involve family in performance of ADLs  - Assess for home care needs following discharge   - Consider OT consult to assist with ADL evaluation and planning for discharge  - Provide patient education as appropriate  Outcome: Progressing  Goal: Maintains/Returns to pre admission functional level  Description: INTERVENTIONS:  - Perform BMAT or MOVE assessment daily    - Set and communicate daily mobility goal to care team and patient/family/caregiver  - Collaborate with rehabilitation services on mobility goals if consulted  - Ambulate patient 3 times a day  - Out of bed to chair 3  times a day   - Out of bed for meals 3 times a day  - Out of bed for toileting  - Record patient progress and toleration of activity level   Outcome: Progressing     Problem: DISCHARGE PLANNING  Goal: Discharge to home or other facility with appropriate resources  Description: INTERVENTIONS:  - Identify barriers to discharge w/patient and caregiver  - Arrange for needed discharge resources and transportation as appropriate  - Identify discharge learning needs (meds, wound care, etc )  - Arrange for interpretive services to assist at discharge as needed  - Refer to Case Management Department for coordinating discharge planning if the patient needs post-hospital services based on physician/advanced practitioner order or complex needs related to functional status, cognitive ability, or social support system  Outcome: Progressing     Problem: Knowledge Deficit  Goal: Patient/family/caregiver demonstrates understanding of disease process, treatment plan, medications, and discharge instructions  Description: Complete learning assessment and assess knowledge base    Interventions:  - Provide teaching at level of understanding  - Provide teaching via preferred learning methods  Outcome: Progressing     Problem: CARDIOVASCULAR - ADULT  Goal: Maintains optimal cardiac output and hemodynamic stability  Description: INTERVENTIONS:  - Monitor I/O, vital signs and rhythm  - Monitor for S/S and trends of decreased cardiac output  - Administer and titrate ordered vasoactive medications to optimize hemodynamic stability  - Assess quality of pulses, skin color and temperature  - Assess for signs of decreased coronary artery perfusion  - Instruct patient to report change in severity of symptoms  Outcome: Progressing  Goal: Absence of cardiac dysrhythmias or at baseline rhythm  Description: INTERVENTIONS:  - Continuous cardiac monitoring, vital signs, obtain 12 lead EKG if ordered  - Administer antiarrhythmic and heart rate control medications as ordered  - Monitor electrolytes and administer replacement therapy as ordered  Outcome: Progressing     Problem: SKIN/TISSUE INTEGRITY - ADULT  Goal: Skin Integrity remains intact(Skin Breakdown Prevention)  Description: Assess:  -Perform [place] assessment twice day  -Clean and moisturize skin every day    Outcome: Progressing  Goal: Incision(s), wounds(s) or drain site(s) healing without S/S of infection  Description: INTERVENTIONS  - Assess and document dressing, incision, wound bed, drain sites and surrounding tissue  - Provide patient and family education    Outcome: Progressing     Problem: METABOLIC, FLUID AND ELECTROLYTES - ADULT  Goal: Electrolytes maintained within normal limits  Description: INTERVENTIONS:  - Monitor labs and assess patient for signs and symptoms of electrolyte imbalances  - Administer electrolyte replacement as ordered  - Monitor response to electrolyte replacements, including repeat lab results as appropriate  - Instruct patient on fluid and nutrition as appropriate  Outcome: Progressing  Goal: Fluid balance maintained  Description: INTERVENTIONS:  - Monitor labs   - Monitor I/O and WT  - Instruct patient on fluid and nutrition as appropriate  - Assess for signs & symptoms of volume excess or deficit  Outcome: Progressing     Problem: GENITOURINARY - ADULT  Goal: Urinary catheter remains patent  Description: INTERVENTIONS:  - Assess patency of urinary catheter  - If patient has a chronic morales, consider changing catheter if non-functioning  - Follow guidelines for intermittent irrigation of non-functioning urinary catheter  Outcome: Progressing     Problem: HEMATOLOGIC - ADULT  Goal: Maintains hematologic stability  Description: INTERVENTIONS  - Assess for signs and symptoms of bleeding or hemorrhage  - Monitor labs  - Administer supportive blood products/factors as ordered and appropriate  Outcome: Progressing     Problem: Potential for Falls  Goal: Patient will remain free of falls  Description: INTERVENTIONS:  - Educate patient/family on patient safety including physical limitations  - Instruct patient to call for assistance with activity   - Consult OT/PT to assist with strengthening/mobility   - Keep Call bell within reach  - Keep bed low and locked with side rails adjusted as appropriate  - Keep care items and personal belongings within reach  - Initiate and maintain comfort rounds  - Make Fall Risk Sign visible to staff  - Offer Toileting every  2 Hours, in advance of need  - Obtain necessary fall risk management equipmen  - Apply yellow socks and bracelet for high fall risk patients  - Consider moving patient to room near nurses station  Outcome: Progressing     Problem: Prexisting or High Potential for Compromised Skin Integrity  Goal: Skin integrity is maintained or improved  Description: INTERVENTIONS:  - Identify patients at risk for skin breakdown  - Assess and monitor skin integrity  - Assess and monitor nutrition and hydration status  - Monitor labs   - Assess for incontinence   - Turn and reposition patient  - Assist with mobility/ambulation  - Relieve pressure over bony prominences  - Avoid friction and shearing  - Provide appropriate hygiene as needed including keeping skin clean and dry  - Evaluate need for skin moisturizer/barrier cream  - Collaborate with interdisciplinary team - Patient/family teaching  - Consider wound care consult   Outcome: Progressing     Problem: MOBILITY - ADULT  Goal: Maintain or return to baseline ADL function  Description: INTERVENTIONS:  -  Assess patient's ability to carry out ADLs; assess patient's baseline for ADL function and identify physical deficits which impact ability to perform ADLs (bathing, care of mouth/teeth, toileting, grooming, dressing, etc )  - Assess/evaluate cause of self-care deficits   - Assess range of motion  - Assess patient's mobility; develop plan if impaired  - Assess patient's need for assistive devices and provide as appropriate  - Encourage maximum independence but intervene and supervise when necessary  - Involve family in performance of ADLs  - Assess for home care needs following discharge   - Consider OT consult to assist with ADL evaluation and planning for discharge  - Provide patient education as appropriate  Outcome: Progressing  Goal: Maintains/Returns to pre admission functional level  Description: INTERVENTIONS:  - Perform BMAT or MOVE assessment daily    - Set and communicate daily mobility goal to care team and patient/family/caregiver     - Collaborate with rehabilitation services on mobility goals if consulted  - Ambulate patient 3 times a day  - Out of bed to chair 3  times a day   - Out of bed for meals 3 times a day  - Out of bed for toileting  - Record patient progress and toleration of activity level   Outcome: Progressing

## 2022-07-18 NOTE — ASSESSMENT & PLAN NOTE
· CHF with worsening renal function now on hemodialysis    · Overall improved  · Continue volume control via HD and torsemide 80 mg daily  · Close follow-up with Cardiology after discharge

## 2022-07-18 NOTE — ASSESSMENT & PLAN NOTE
· With progression of renal disease now deemed to be end-stage    · Status post PermCath placement on 07/15/2022  · Tolerated 1st day of hemodialysis on 07/16/2022  · Ongoing management per Nephrology  · Will need outpatient hemodialysis chair set up prior to discharge

## 2022-07-19 VITALS
OXYGEN SATURATION: 95 % | WEIGHT: 269.84 LBS | DIASTOLIC BLOOD PRESSURE: 57 MMHG | TEMPERATURE: 97.9 F | HEART RATE: 62 BPM | RESPIRATION RATE: 18 BRPM | HEIGHT: 68 IN | SYSTOLIC BLOOD PRESSURE: 131 MMHG | BODY MASS INDEX: 40.9 KG/M2

## 2022-07-19 LAB
GLUCOSE SERPL-MCNC: 229 MG/DL (ref 65–140)
GLUCOSE SERPL-MCNC: 300 MG/DL (ref 65–140)

## 2022-07-19 PROCEDURE — 82948 REAGENT STRIP/BLOOD GLUCOSE: CPT

## 2022-07-19 PROCEDURE — 99239 HOSP IP/OBS DSCHRG MGMT >30: CPT | Performed by: INTERNAL MEDICINE

## 2022-07-19 RX ORDER — INSULIN GLARGINE 100 [IU]/ML
25 INJECTION, SOLUTION SUBCUTANEOUS EVERY 12 HOURS SCHEDULED
Qty: 30 ML | Refills: 0
Start: 2022-07-19 | End: 2022-09-01 | Stop reason: SDUPTHER

## 2022-07-19 RX ORDER — GABAPENTIN 800 MG/1
400 TABLET ORAL DAILY
Qty: 120 TABLET | Refills: 0
Start: 2022-07-19 | End: 2022-07-28

## 2022-07-19 RX ORDER — ISOSORBIDE MONONITRATE 30 MG/1
30 TABLET, EXTENDED RELEASE ORAL EVERY EVENING
Qty: 30 TABLET | Refills: 0 | Status: SHIPPED | OUTPATIENT
Start: 2022-07-19 | End: 2022-09-01 | Stop reason: SDUPTHER

## 2022-07-19 RX ORDER — INSULIN LISPRO 100 [IU]/ML
10 INJECTION, SOLUTION INTRAVENOUS; SUBCUTANEOUS
Qty: 15 ML | Refills: 0
Start: 2022-07-19 | End: 2022-09-01 | Stop reason: SDUPTHER

## 2022-07-19 RX ORDER — LOSARTAN POTASSIUM 25 MG/1
25 TABLET ORAL DAILY
Qty: 30 TABLET | Refills: 0 | Status: SHIPPED | OUTPATIENT
Start: 2022-07-20 | End: 2022-08-17

## 2022-07-19 RX ORDER — AMLODIPINE BESYLATE 2.5 MG/1
2.5 TABLET ORAL 2 TIMES DAILY
Qty: 60 TABLET | Refills: 0 | Status: SHIPPED | OUTPATIENT
Start: 2022-07-19 | End: 2022-08-18

## 2022-07-19 RX ADMIN — LOSARTAN POTASSIUM 25 MG: 25 TABLET, FILM COATED ORAL at 08:17

## 2022-07-19 RX ADMIN — BISOPROLOL FUMARATE 2.5 MG: 5 TABLET ORAL at 08:19

## 2022-07-19 RX ADMIN — LIDOCAINE 1 PATCH: 50 PATCH CUTANEOUS at 08:17

## 2022-07-19 RX ADMIN — HEPARIN SODIUM 5000 UNITS: 5000 INJECTION INTRAVENOUS; SUBCUTANEOUS at 05:28

## 2022-07-19 RX ADMIN — ATORVASTATIN CALCIUM 40 MG: 40 TABLET, FILM COATED ORAL at 08:17

## 2022-07-19 RX ADMIN — GABAPENTIN 400 MG: 400 CAPSULE ORAL at 08:17

## 2022-07-19 RX ADMIN — INSULIN LISPRO 4 UNITS: 100 INJECTION, SOLUTION INTRAVENOUS; SUBCUTANEOUS at 08:18

## 2022-07-19 RX ADMIN — ASPIRIN 81 MG: 81 TABLET, COATED ORAL at 08:17

## 2022-07-19 RX ADMIN — CITALOPRAM HYDROBROMIDE 40 MG: 20 TABLET ORAL at 08:17

## 2022-07-19 RX ADMIN — PANTOPRAZOLE SODIUM 40 MG: 40 TABLET, DELAYED RELEASE ORAL at 15:31

## 2022-07-19 RX ADMIN — HEPARIN SODIUM 5000 UNITS: 5000 INJECTION INTRAVENOUS; SUBCUTANEOUS at 13:43

## 2022-07-19 RX ADMIN — ACETAMINOPHEN 650 MG: 325 TABLET, FILM COATED ORAL at 11:10

## 2022-07-19 RX ADMIN — LEVOTHYROXINE SODIUM 50 MCG: 50 TABLET ORAL at 05:28

## 2022-07-19 RX ADMIN — NYSTATIN: 100000 POWDER TOPICAL at 08:18

## 2022-07-19 RX ADMIN — INSULIN LISPRO 5 UNITS: 100 INJECTION, SOLUTION INTRAVENOUS; SUBCUTANEOUS at 12:30

## 2022-07-19 RX ADMIN — PANTOPRAZOLE SODIUM 40 MG: 40 TABLET, DELAYED RELEASE ORAL at 05:28

## 2022-07-19 RX ADMIN — ALLOPURINOL 300 MG: 100 TABLET ORAL at 08:17

## 2022-07-19 RX ADMIN — HYDROCODONE BITARTRATE AND ACETAMINOPHEN 1 TABLET: 5; 325 TABLET ORAL at 05:34

## 2022-07-19 RX ADMIN — SENNOSIDES 8.6 MG: 8.6 TABLET, FILM COATED ORAL at 08:17

## 2022-07-19 RX ADMIN — TORSEMIDE 80 MG: 20 TABLET ORAL at 08:17

## 2022-07-19 RX ADMIN — CLOPIDOGREL BISULFATE 75 MG: 75 TABLET ORAL at 08:17

## 2022-07-19 RX ADMIN — ISOSORBIDE MONONITRATE 60 MG: 60 TABLET, EXTENDED RELEASE ORAL at 08:17

## 2022-07-19 RX ADMIN — INSULIN LISPRO 5 UNITS: 100 INJECTION, SOLUTION INTRAVENOUS; SUBCUTANEOUS at 08:18

## 2022-07-19 RX ADMIN — INSULIN LISPRO 8 UNITS: 100 INJECTION, SOLUTION INTRAVENOUS; SUBCUTANEOUS at 12:31

## 2022-07-19 RX ADMIN — AMLODIPINE BESYLATE 2.5 MG: 2.5 TABLET ORAL at 08:17

## 2022-07-19 NOTE — ASSESSMENT & PLAN NOTE
Lab Results   Component Value Date    HGBA1C 7 3 (A) 03/29/2022     · Patient's insulin requirements were significantly lower than home dose while inpatient  · Decreased Lantus to 25 units b i d , Humalog 10 units t i d   With meals

## 2022-07-19 NOTE — ASSESSMENT & PLAN NOTE
· With progression of renal disease now deemed to be end-stage    · Status post PermCath placement on 07/15/2022  · Tolerated 1st day of hemodialysis on 07/16/2022  · Ongoing management per Nephrology  · Patient establish chair at [facility] hemodialysis Monday Wednesday Friday prior to discharge

## 2022-07-19 NOTE — DISCHARGE INSTRUCTIONS
Dear Addy Kenney,     It was our pleasure to care for you here at [facility]  It is our hope that we were always able to exceed the expected standards for your care during your stay  You were hospitalized due to volume overload and worsening kidney dysfunction  You were cared for on the 2nd floor under the service of Cheryle Drilling, DO with the [facility] who covers for your primary care physician (PCP), Jennifer Allan, while you were hospitalized  If you have any questions or concerns related to this hospitalization, you may contact us at [phone]  For follow up as well as medication refills, we recommend that you follow up with your primary care physician  A registered nurse will reach out to you by phone within a few days after your discharge to answer any additional questions that you may have after going home  However, at this time we provide for you here, the most important instructions / recommendations at discharge:     Notable Medication Adjustments -   Decreasing amlodipine to 2 5 mg twice a day  Add additional dose of Imdur 30 mg at night  Start losartan 25 mg daily  Decreased dose of gabapentin due to renal clearance to 400 mg daily  Your insulin requirements were significantly lower in the hospital   Please half your home insulin dosage at this time  Follow up with the PCP for ongoing management  Testing Required after Discharge -   No further testing   Incidental findings that Require Follow Up   None  Important Follow Up Information -   Please see PCP within 2 weeks  Follow up at [facility] 7/20/2022 at 3:15 p m  Please review this entire after visit summary as additional general instructions including medication list, appointments, activity, diet, any pertinent wound care, and other additional recommendations from your care team that may be provided for you        Sincerely, Yahir Duval, DO

## 2022-07-19 NOTE — ASSESSMENT & PLAN NOTE
· Known history of cardiomyopathy with EF 38%  · Cardiology recommendations reviewed  · Continue beta-blocker

## 2022-07-19 NOTE — DISCHARGE SUMMARY
[facility]  Discharge- [place] 1960, 63 y o  female MRN: [id number]  Unit/Bed#: [place] [address] [address] 206[address] Encounter: [id number]  Primary Care Provider: CHERELLE Krueger   Date and time admitted to hospital: 7/6/2022  4:59 PM    * 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 heart failure ([facility] )  Assessment & Plan  · CHF with worsening renal function despite aggressive diuresis now on hemodialysis  · Volume status significantly improved  · Continue volume control via HD and torsemide 80 mg daily  · Close follow-up with Cardiology/Nephrology after discharge      NSVT (nonsustained ventricular tachycardia) ([facility])  Assessment & Plan  · Known history of cardiomyopathy with EF 38%  · Cardiology recommendations reviewed  · Continue beta-blocker    Acquired hypothyroidism  Assessment & Plan  · Continue levothyroxine 50 mcg daily    ESRD (end stage renal disease) ([facility] )  Assessment & Plan  · With progression of renal disease now deemed to be end-stage  · Status post PermCath placement on 07/15/2022  · Tolerated 1st day of hemodialysis on 07/16/2022  · Ongoing management per Nephrology  · Patient establish chair at [facility] hemodialysis Monday Wednesday Friday prior to discharge    Anemia  Assessment & Plan  Anemia secondary to renal disease  Stable  Monitor and transfuse as needed        CAD (coronary artery disease)  Assessment & Plan  · Stable  · Known history of CAD with previous stenting in 2012  · Status post recent cardiac catheterization in July 1, 2022  · Status post stenting in 2012, then revised in 2017  · Continue aspirin, Plavix, bisoprolol 2 5 mg daily, and Imdur 60 mg daily Lipitor 40 mg daily      Type 2 diabetes mellitus with stage 5 chronic kidney disease not on chronic dialysis, with long-term current use of insulin [facility])  Assessment & Plan  Lab Results   Component Value Date    HGBA1C 7 3 (A) 03/29/2022     · Patient's insulin requirements were significantly lower than home dose while inpatient  · Decreased Lantus to 25 units b i d , Humalog 10 units t i d  With meals    Discharging Physician / Practitioner: Abe Ramos DO  PCP: Purvi Root  Admission Date:   Admission Orders (From admission, onward)     Ordered        07/06/22 1742  INPATIENT ADMISSION  Once                      Discharge Date: 07/19/22    Medical Problems             Resolved Problems  Date Reviewed: 7/17/2022   None                 Consultations During Hospital Stay:  Cardiology, nutrition, nephrology    Procedures Performed: Tunneled dialysis catheter placement    Significant Findings / Test Results:     XR chest 1 view portable    Result Date: 7/7/2022  Impression: Unchanged enlargement of the cardiomediastinal silhouette  Possible small bilateral pleural effusions  Decreased right basilar opacity  Workstation performed: [id number]       Incidental Findings: None    Test Results Pending at Discharge (will require follow up): None     Outpatient Tests Requested: None    Reason for Admission:  Acute heart failure    Hospital Course:     Drew Flanagan is a 63 y o  female With past medical history of coronary artery disease, CKD, EUN, insulin-dependent type 2 diabetes presented with acute heart failure  Recommended for discharge after cardiology visit noted significant weight gain from recent discharge  Initially diuretics were attempted but were unsuccessful  Given patient's volume overload, recurrent admission and progression of kidney disease decision was made to initiate hemodialysis  Patient had IR tunneled catheter placement 7/15  The patient was initiated on intermittent hemodialysis 7/16/2022  She tolerated this well with improvement in volume status  Patient was set up at [facility] dialysis on [facility] Monday Wednesday Friday schedule  Please see above list of diagnoses and related plan for additional information  Condition at Discharge: stable     Discharge Day Visit / Exam:     Subjective:    Patient seen and evaluated at bedside  She feels well  No complaints  Vitals: Blood Pressure: 142/58 (07/19/22 0720)  Pulse: 56 (07/19/22 0720)  Temperature: 98 1 °F (36 7 °C) (07/19/22 0720)  Temp Source: Oral (07/19/22 0720)  Respirations: 18 (07/19/22 0720)  Height: 5' 8" (172 7 cm) (07/09/22 1119)  Weight - Scale: 122 kg (269 lb 13 5 oz) (07/19/22 0533)  SpO2: 95 % (07/19/22 0720)  Exam:   Physical Exam  Vitals reviewed  Constitutional:       General: She is not in acute distress  Appearance: She is well-developed  She is not ill-appearing, toxic-appearing or diaphoretic  HENT:      Head: Normocephalic and atraumatic  Mouth/Throat:      Mouth: Mucous membranes are moist    Eyes:      General: No scleral icterus  Conjunctiva/sclera: Conjunctivae normal    Cardiovascular:      Rate and Rhythm: Normal rate and regular rhythm  Heart sounds: Normal heart sounds  Pulmonary:      Effort: Pulmonary effort is normal  No respiratory distress  Breath sounds: Normal breath sounds  No wheezing or rales  Abdominal:      General: There is no distension  Palpations: Abdomen is soft  Tenderness: There is no abdominal tenderness  There is no guarding or rebound  Musculoskeletal:         General: No swelling, tenderness or deformity  Skin:     General: Skin is warm and dry  Neurological:      General: No focal deficit present  Mental Status: She is alert  Mental status is at baseline  Psychiatric:         Mood and Affect: Mood normal          Behavior: Behavior normal          Thought Content: Thought content normal          Judgment: Judgment normal            Discharge instructions/Information to patient and family:   See after visit summary for information provided to patient and family        Provisions for Follow-Up Care:  See after visit summary for information related to follow-up care and any pertinent home health orders  Disposition:     Home     Discharge Statement:  I spent 60 minutes discharging the patient  This time was spent on the day of discharge  I had direct contact with the patient on the day of discharge  Greater than 50% of the total time was spent examining patient, answering all patient questions, arranging and discussing plan of care with patient as well as directly providing post-discharge instructions  Additional time then spent on discharge activities  Discharge Medications:  See after visit summary for reconciled discharge medications provided to patient and family        ** Please Note: This note has been constructed using a voice recognition system **

## 2022-07-19 NOTE — CASE MANAGEMENT
Case Management Discharge Planning Note    Patient name Jazmyn Obrien  Location [place] [address] Pasquale Hester* MRN [id number]  : 1960 Date 2022       Current Admission Date: 2022  Current Admission Diagnosis: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 heart failure [facility])   Patient Active Problem List    Diagnosis Date Noted    NS[facility]onsustained [facility] tachycardia) [facility] ) 2022    Hyponatremia 2022    History of anemia due to chronic kidney disease 2022    Bradycardia 2022    Syncope 2022    Acute renal failure superimposed on chronic kidney disease ([facility] ) 2022    Chest pain 2022    Elevated troponin I level 2022    Hypotension 2022    ALFRED (acute kidney injury) ([facility] ) 2022    Encounter for examination following treatment at hospital 2022    Other artificial openings of urinary tract status ([facility] ) 02/10/2022    Continuous opioid dependence ([facility] ) 02/10/2022    Adrenal nodule ([facility] ) 02/10/2022    Stable angina ([facility] ) 02/10/2022    Volume overload 02/10/2022    Acquired hypothyroidism 10/14/2021    Mixed hyperlipidemia 10/14/2021    Gastroesophageal reflux disease without esophagitis 10/13/2021    Other proteinuria 2021    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 heart failure ([facility] ) 2021    Prolonged QT interval 2021    Urinary tract infection associated with cystostomy catheter ([facility] ) 2021    Neurogenic bladder 2021    Morbid obesity with BMI of 45 0-49 9, adult ([facility] ) 06/15/2021    History of heart artery stent 06/15/2021    ESRD (end stage renal disease) ([facility] ) 2021    Memory impairment 2021    Chronic bronchitis ([address] ) 2021    Severe episode of recurrent major depressive disorder, without psychotic features ([facility] ) 2021    Skin ulcer of hand, limited to breakdown of skin ([facility] ) 2021    HTN (hypertension) 2020  Diabetic ulcer of toe of right foot associated with type 2 diabetes mellitus, with muscle involvement without evidence of necrosis ([facility] ) 11/25/2020    Head lump 11/11/2020    Anemia 09/09/2020    Abnormal LFTs 09/09/2020    S/P cervical spinal fusion 09/09/2020    Major depressive disorder 09/02/2020    Type 2 diabetes mellitus with stage 5 chronic kidney disease not on chronic dialysis, with long-term current use of insulin ([facility] ) 09/02/2020    Anxiety 09/02/2020    CAD (coronary artery disease) 09/02/2020    Osteoarthritis of left knee 09/02/2020    Cellulitis of finger of right hand 08/26/2020      LOS (days): 13  Geometric Mean LOS (GMLOS) (days): 3 80  Days to GMLOS:-9 1     OBJECTIVE:  Risk of Unplanned Readmission Score: 65 65         Current admission status: Inpatient   Preferred Pharmacy:   [address] [place] [address], [address]  [place] [address]  Phone: [phone] Fax: [phone]    Primary Care Provider: CHERELLE Vee    Primary Insurance: IRIS Quinonez [employer] REP  Secondary Insurance: [address] [place] [address] DETAILS:  Provided pt with handwritten note about:   [address] [place] [address] Dialysis for the following:   MWF Shift 3 at 3:45 PM starting 7/20/2022 3:15 PM    Provided print out of directions and [employer] costs to/from [place] location/pts home  Pt has list of physical therapy locations and understands she must call location to make appointment for outpatient PT  Pt has her own cane

## 2022-07-19 NOTE — ASSESSMENT & PLAN NOTE
· CHF with worsening renal function despite aggressive diuresis now on hemodialysis    · Volume status significantly improved  · Continue volume control via HD and torsemide 80 mg daily  · Close follow-up with Cardiology/Nephrology after discharge

## 2022-07-19 NOTE — DISCHARGE INSTR - OTHER ORDERS
You must call a physical therapy location (list provided) and make an appointment for physical therapy follow up as an outpatient

## 2022-07-20 ENCOUNTER — TELEPHONE (OUTPATIENT)
Dept: NEPHROLOGY | Facility: CLINIC | Age: 62
End: 2022-07-20

## 2022-07-20 NOTE — TELEPHONE ENCOUNTER
----- Message from Macarena Scott MD sent at 7/19/2022  1:50 PM EDT -----  Regarding: FW: inpatient  This patient has been started on dialysis and is ESRD please cancel the appointment  As we will not need to come to the office anymore thank you    Anisa    ----- Message -----  From: Kirti Early MD  Sent: 7/18/2022   3:33 PM EDT  To: Macarena Scott MD  Subject: RE: inpatient                                    Yes we will continue with suprapubic catheter management and exchange every 4 to 6 weeks until it is deemed no longer necessary  Thanks  ----- Message -----  From: Macarena Scott MD  Sent: 7/18/2022   3:02 PM EDT  To: Kirti Early MD  Subject: inpatient                                        Hi  I just wanted to touch base on this patient of Dr Roach that you are following who has a suprapubic catheter  Just wanted to know what the plans are in terms of the catheter  Patient was just started on hemodialysis this hospitalization on 07/16/2022 and is getting discharged we will follow up as ESRD on dialysis  I would assume the catheter stays in for now and when her urine output totally stops then the catheter could be removed but I just wanted to know if you have any other suggestions or with your plan for follow-up for her were  She still has good urine output    Thank you    Ju Leonardo

## 2022-07-21 DIAGNOSIS — F51.05 INSOMNIA SECONDARY TO ANXIETY: ICD-10-CM

## 2022-07-21 DIAGNOSIS — F41.9 INSOMNIA SECONDARY TO ANXIETY: ICD-10-CM

## 2022-07-22 ENCOUNTER — HOME CARE VISIT (OUTPATIENT)
Dept: HOME HEALTH SERVICES | Facility: HOME HEALTHCARE | Age: 62
End: 2022-07-22

## 2022-07-22 PROCEDURE — 4010F ACE/ARB THERAPY RXD/TAKEN: CPT

## 2022-07-22 RX ORDER — OLANZAPINE 5 MG/1
5 TABLET ORAL
Qty: 90 TABLET | Refills: 0 | Status: SHIPPED | OUTPATIENT
Start: 2022-07-22 | End: 2022-08-31

## 2022-07-22 NOTE — CASE COMMUNICATION
[facility] Team  I was wandering how my pt was doing in the hospital and turns out she was discharged 7/19/[facility] no mentioning of [facility]  She was on [facility] services for suprapubic catheter management was hospitalized with exac of CHF and ended up on dialysis  Not sure what to do now  thank you

## 2022-07-25 ENCOUNTER — TELEPHONE (OUTPATIENT)
Dept: ADMINISTRATIVE | Facility: HOSPITAL | Age: 62
End: 2022-07-25

## 2022-07-25 ENCOUNTER — TRANSCRIBE ORDERS (OUTPATIENT)
Dept: VASCULAR SURGERY | Facility: CLINIC | Age: 62
End: 2022-07-25

## 2022-07-25 ENCOUNTER — TELEPHONE (OUTPATIENT)
Dept: VASCULAR SURGERY | Facility: CLINIC | Age: 62
End: 2022-07-25

## 2022-07-25 DIAGNOSIS — N18.6 ESRD (END STAGE RENAL DISEASE) (HCC): Primary | ICD-10-CM

## 2022-07-25 NOTE — TELEPHONE ENCOUNTER
I called pt to schedule her vein mapping and ov  I left a  for her to call back and schedule  The order was placed and is in her chart to schedule off of  From: Uyen Freeman <[email or web address]   Sent: Saturday, July 23, 2022 7:48 AM  To: Loni De Jesus <[email or web address]; Lela Edgar <Padmini  [email or web address]; Karen Merritt <[email or web address]; [place], [address] <[email or web address]  Cc: Karime Camargo <Karime  [email or web address]; Yahaira White <Yahaira  [email or web address]; Gennail Ruby <Gennail  [email or web address]; Devgeovanna Hoot <[email or web address]; [email or web address]; Mickey Grant <[email or web address]  Subject: [EXTERNAL] appts needed for [facility] pt BK please    WARNING! Based upon the critical issue with [employer] targeting Healthcare systems ALL attachments and links from this email should be heavily scrutinized  Hi Vascular Schedulers,    We have a new CVC pt, Jose Canada, at the [facility] who is ready to start vascular access planning  Nephrologist is Dr Kenya Morales and HD schedule is [facility] 3rd shift (3:30pm arrival)  Pt is planning to move to [place] in September, so expedited appts would be appreciated  Pt was educated by me last night and she is anticipating a call from your office to schedule vein mapping and surgical consult appts      Pt details:  Cheri Hart  63year old female  1960     [address] [place] [address]     [phone] (M)    Thank you,  Shahid Michaud

## 2022-07-25 NOTE — TELEPHONE ENCOUNTER
CALLED PT TO SCHEDULE VEIN MAPPING AND A FOLLOW UP OV  [facility] [place] AT [facility]  PT IS UNAVILABLE ON MWF FOR APPTS  LEFT PT A VM TO CALL BACK AND SCHEDULE

## 2022-07-25 NOTE — TELEPHONE ENCOUNTER
Pt states she is moving to [facility] august 5th  Doesn't know if she should get testing done now or wait until she moves

## 2022-07-28 ENCOUNTER — TELEPHONE (OUTPATIENT)
Dept: FAMILY MEDICINE CLINIC | Facility: CLINIC | Age: 62
End: 2022-07-28

## 2022-07-28 NOTE — TELEPHONE ENCOUNTER
Megan Bailey  30  / Order # [id number] form received on 07/28/22  to be completed by PCP  Copy made and placed in PCP folder  Forms to be delivered to PCP mailbox at assigned time        Order # [id number]      Note: a blank copy of the form has been made and placed at  copy Bin, under the letter K

## 2022-07-28 NOTE — TELEPHONE ENCOUNTER
PCP Nuria 52 FORM RECEIVED VIA FAX AND PLACED IN PCP FOLDER TO BE DELIVERED AT ASSIGNED TIMES        Order # [id number]

## 2022-07-29 ENCOUNTER — TELEPHONE (OUTPATIENT)
Dept: FAMILY MEDICINE CLINIC | Facility: CLINIC | Age: 62
End: 2022-07-29

## 2022-07-29 NOTE — TELEPHONE ENCOUNTER
PCP Nuria 52 FORM RECEIVED VIA FAX AND PLACED IN PCP FOLDER TO BE DELIVERED AT ASSIGNED TIMES        Order # [id number]

## 2022-07-29 NOTE — TELEPHONE ENCOUNTER
FAXED ON 07/29/22 TO [facility] at [phone]  FAX CONFIRMATION RECEIVED   [facility]  2  CHART     ORDER #[id number]

## 2022-07-29 NOTE — TELEPHONE ENCOUNTER
07/29/22    FAXED ON 07/29/22 TO [address] at [phone]  FAX CONFIRMATION RECEIVED  NOTE: form with Confirmation Fax has been scanned into Pt Chart      Order # [id number]

## 2022-07-31 ENCOUNTER — TELEPHONE (OUTPATIENT)
Dept: OTHER | Facility: OTHER | Age: 62
End: 2022-07-31

## 2022-07-31 NOTE — TELEPHONE ENCOUNTER
Elodia with Linda [facility]ozoya to advise that she is this patient's new nurse  and can be reached for any questions or concerns at the number provided

## 2022-08-01 ENCOUNTER — OFFICE VISIT (OUTPATIENT)
Dept: FAMILY MEDICINE CLINIC | Facility: CLINIC | Age: 62
End: 2022-08-01

## 2022-08-01 VITALS
RESPIRATION RATE: 18 BRPM | TEMPERATURE: 97.7 F | SYSTOLIC BLOOD PRESSURE: 138 MMHG | OXYGEN SATURATION: 97 % | BODY MASS INDEX: 40.22 KG/M2 | DIASTOLIC BLOOD PRESSURE: 68 MMHG | HEIGHT: 68 IN | HEART RATE: 70 BPM | WEIGHT: 265.4 LBS

## 2022-08-01 DIAGNOSIS — Z79.4 TYPE 2 DIABETES MELLITUS WITH STAGE 5 CHRONIC KIDNEY DISEASE NOT ON CHRONIC DIALYSIS, WITH LONG-TERM CURRENT USE OF INSULIN (HCC): ICD-10-CM

## 2022-08-01 DIAGNOSIS — N31.9 NEUROGENIC BLADDER: ICD-10-CM

## 2022-08-01 DIAGNOSIS — E11.22 TYPE 2 DIABETES MELLITUS WITH STAGE 5 CHRONIC KIDNEY DISEASE NOT ON CHRONIC DIALYSIS, WITH LONG-TERM CURRENT USE OF INSULIN (HCC): ICD-10-CM

## 2022-08-01 DIAGNOSIS — E03.9 ACQUIRED HYPOTHYROIDISM: ICD-10-CM

## 2022-08-01 DIAGNOSIS — Z98.1 S/P CERVICAL SPINAL FUSION: ICD-10-CM

## 2022-08-01 DIAGNOSIS — N18.6 ESRD (END STAGE RENAL DISEASE) (HCC): ICD-10-CM

## 2022-08-01 DIAGNOSIS — K59.00 CONSTIPATION, UNSPECIFIED CONSTIPATION TYPE: ICD-10-CM

## 2022-08-01 DIAGNOSIS — K21.9 GASTROESOPHAGEAL REFLUX DISEASE WITHOUT ESOPHAGITIS: Primary | ICD-10-CM

## 2022-08-01 DIAGNOSIS — E11.621 DIABETIC ULCER OF TOE OF RIGHT FOOT ASSOCIATED WITH TYPE 2 DIABETES MELLITUS, WITH MUSCLE INVOLVEMENT WITHOUT EVIDENCE OF NECROSIS (HCC): ICD-10-CM

## 2022-08-01 DIAGNOSIS — F11.20 CONTINUOUS OPIOID DEPENDENCE (HCC): ICD-10-CM

## 2022-08-01 DIAGNOSIS — I50.43 ACUTE ON CHRONIC COMBINED SYSTOLIC AND DIASTOLIC CONGESTIVE HEART FAILURE (HCC): ICD-10-CM

## 2022-08-01 DIAGNOSIS — E11.42 TYPE 2 DIABETES MELLITUS WITH DIABETIC POLYNEUROPATHY, WITH LONG-TERM CURRENT USE OF INSULIN (HCC): ICD-10-CM

## 2022-08-01 DIAGNOSIS — I10 HYPERTENSION, UNSPECIFIED TYPE: ICD-10-CM

## 2022-08-01 DIAGNOSIS — N18.5 TYPE 2 DIABETES MELLITUS WITH STAGE 5 CHRONIC KIDNEY DISEASE NOT ON CHRONIC DIALYSIS, WITH LONG-TERM CURRENT USE OF INSULIN (HCC): ICD-10-CM

## 2022-08-01 DIAGNOSIS — Z79.4 TYPE 2 DIABETES MELLITUS WITH DIABETIC POLYNEUROPATHY, WITH LONG-TERM CURRENT USE OF INSULIN (HCC): ICD-10-CM

## 2022-08-01 DIAGNOSIS — L97.515 DIABETIC ULCER OF TOE OF RIGHT FOOT ASSOCIATED WITH TYPE 2 DIABETES MELLITUS, WITH MUSCLE INVOLVEMENT WITHOUT EVIDENCE OF NECROSIS (HCC): ICD-10-CM

## 2022-08-01 LAB — SL AMB POCT HEMOGLOBIN AIC: 7 (ref ?–6.5)

## 2022-08-01 PROCEDURE — 83036 HEMOGLOBIN GLYCOSYLATED A1C: CPT | Performed by: NURSE PRACTITIONER

## 2022-08-01 PROCEDURE — 99495 TRANSJ CARE MGMT MOD F2F 14D: CPT | Performed by: NURSE PRACTITIONER

## 2022-08-01 RX ORDER — OXYCODONE HYDROCHLORIDE 5 MG/1
5 TABLET ORAL EVERY 8 HOURS PRN
Qty: 50 TABLET | Refills: 0 | Status: SHIPPED | OUTPATIENT
Start: 2022-08-01 | End: 2022-09-01 | Stop reason: SDUPTHER

## 2022-08-01 NOTE — PROGRESS NOTES
Assessment/Plan:    Gastroesophageal reflux disease without esophagitis  Continue PPI     Type 2 diabetes mellitus with stage 5 chronic kidney disease not on chronic dialysis, with long-term current use of insulin ([facility])    Lab Results   Component Value Date    HGBA1C 7 0 (A) 08/01/2022      She can continue with Lantus 25 units bid and Humalog 10 units tid  Would like her to start CGM to monitor for hypoglycemia     Acquired hypothyroidism  Lab Results   Component Value Date    [id number] 4 430 08/03/2021     Continue levothyroxine 50 mcg daily     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 heart failure (HCC)  Wt Readings from Last 3 Encounters:   08/01/22 120 kg (265 lb 6 4 oz)   07/19/22 122 kg (269 lb 13 5 oz)   07/06/22 136 kg (300 lb)       Required aggressive diuresis, renal function significantly decreased   Continue torsemide 80 mg daily   Follow up with cardiology       ESRD (end stage renal disease) ([facility] [address] )  Lab Results   Component Value Date    EGFR 14 07/18/2022    EGFR 15 07/17/2022    EGFR 12 07/16/2022    CREATININE 3 33 (H) 07/18/2022    CREATININE 3 20 (H) 07/17/2022    CREATININE 3 86 (H) 07/16/2022     Dialysis at [facility]  RCW tunneled catheter with plans for permanent access   Continue follow up with nephrology   Referral to palliative care     Shannon Bass was seen today for tcm discharge      Diagnoses and all orders for this visit:    Gastroesophageal reflux disease without esophagitis    Type 2 diabetes mellitus with stage 5 chronic kidney disease not on chronic dialysis, with long-term current use of insulin ([facility])  -     POCT blood glucose    Diabetic ulcer of toe of right foot associated with type 2 diabetes mellitus, with muscle involvement without evidence of necrosis ([facility])    Acquired hypothyroidism    Hypertension, unspecified type  -     CBC and differential; Future    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 heart failure (HCC)  -     CBC and differential; Future    Neurogenic bladder    Type 2 diabetes mellitus with diabetic polyneuropathy, with long-term current use of insulin (HCC)  -     Basic metabolic panel; Future  -     POCT hemoglobin A1c    ESRD (end stage renal disease) ([facility] [address] )  -     Ambulatory Referral to Palliative Care; Future    Constipation, unspecified constipation type  -     docusate sodium (COLACE) 50 mg capsule; Take 1 capsule (50 mg total) by mouth 2 (two) times a day    Continuous opioid dependence (HCC)  -     oxyCODONE (ROXICODONE) 5 immediate release tablet; Take 1 tablet (5 mg total) by mouth every 8 (eight) hours as needed for moderate pain Max Daily Amount: 15 mg    S/P cervical spinal fusion  -     gabapentin (Neurontin) 100 mg capsule; Take 1 capsule (100 mg total) by mouth 2 (two) times a day        Return in about 4 weeks (around 8/29/2022)  TCM Call     Date and time call was made  2/17/2022 11:51 AM    Hospital care reviewed  Records not available    Patient was hospitialized at  [facility] - CLOSED    Date of Admission  02/10/22    Date of discharge  02/16/22    Diagnosis  congestive heart failure    Disposition  Home    Were the patients medications reviewed and updated  Yes    Current Symptoms  None      TCM Call     Post hospital issues  None    Scheduled for follow up?   Yes    Did you obtain your prescribed medications  Yes    Do you need help managing your prescriptions or medications  No    Is transportation to your appointment needed  No    I have advised the patient to call PCP with any new or worsening symptoms  Jennifer ROQUEA    Living Arrangements  Family members    Support System  Family          Subjective:     Jazmyn Obrien is a 63 y o  female who  has a past medical history of Abnormal liver function, Anemia, Anxiety, Arthritis, Chronic kidney disease, Chronic narcotic dependence ([facility] [address] ), Chronic pain disorder, Coronary artery disease, Depression, Diabetes mellitus ([facility] [address] ), Elevated troponin, GERD (gastroesophageal reflux disease), Heart murmur, Hyperlipidemia, Hypertension, Neurogenic bladder, Open toe wound, Renal disorder, Shortness of breath, Sleep apnea, and Suprapubic catheter ([address] )  who presented to the office today for hospital follow up  She was hospitalized from 7/6 - 7/19 [facility] [address] [address]  She required aggressive diuresis and unfortunately kidney function declined and she has now started hemodialysis  Currently with access at right chest wall tunneled catheter  Plans for vein mapping/ permanent access  We discussed completing a POLST form, she is not ready to make those decisions today but will think about it and we will discuss again at next visit       The following portions of the patient's history were reviewed and updated as appropriate: allergies, current medications, past family history, past medical history, past social history, past surgical history and problem list     Current Outpatient Medications on File Prior to Visit   Medication Sig Dispense Refill    allopurinol (ZYLOPRIM) 300 mg tablet Take 1 tablet (300 mg total) by mouth daily 90 tablet 1    amLODIPine (NORVASC) 2 5 mg tablet Take 1 tablet (2 5 mg total) by mouth 2 (two) times a day 60 tablet 0    aspirin (ECOTRIN LOW STRENGTH) 81 mg EC tablet Take 1 tablet (81 mg total) by mouth daily 90 tablet 1    atorvastatin (LIPITOR) 40 mg tablet Take 1 tablet (40 mg total) by mouth daily 90 tablet 1    bisoprolol (ZEBETA) 5 mg tablet Take 0 5 tablets (2 5 mg total) by mouth daily 90 tablet 0    citalopram (CeleXA) 40 mg tablet Take 1 tablet (40 mg total) by mouth daily 90 tablet 1    clopidogrel (PLAVIX) 75 mg tablet Take 1 tablet (75 mg total) by mouth daily 90 tablet 1    Dulaglutide (Trulicity) 1 5 SA/1 5IG SOPN Inject 0 5 mL (1 5 mg total) under the skin once a week 6 mL 1    ferrous sulfate 325 (65 Fe) mg tablet TAKE 1 TABLET BY MOUTH EVERY OTHER DAY 45 tablet 0    insulin glargine (Lantus SoloStar) 100 units/mL injection pen Inject 25 Units under the skin every 12 (twelve) hours 30 mL 0    insulin lispro (HumaLOG) 100 units/mL injection pen Inject 10 Units under the skin 3 (three) times a day with meals 15 mL 0    Insulin Pen Needle (BD Pen Needle Micro U/F) 32G X 6 MM MISC Use 5 (five) times a day 200 each 5    Insulin Syringe-Needle U-100 (BD Veo Insulin Syringe U/F) 31G X 15/64" 0 5 ML MISC Inject under the skin 3 (three) times a day 100 each 2    isosorbide mononitrate (IMDUR) 30 mg 24 hr tablet Take 1 tablet (30 mg total) by mouth every evening 30 tablet 0    isosorbide mononitrate (IMDUR) 60 mg 24 hr tablet TAKE 1 TABLET BY MOUTH EVERY DAY 90 tablet 2    Lancets (OneTouch Delica Plus [id number]) MISC USE TO TEST 3 TIMES DAILY 100 each 2    levothyroxine 50 mcg tablet TAKE 1 TABLET BY MOUTH EVERY DAY 60 tablet 0    losartan (COZAAR) 25 mg tablet Take 1 tablet (25 mg total) by mouth daily 30 tablet 0    naloxone (NARCAN) 4 mg/0 1 mL nasal spray Administer 1 spray into a nostril  If breathing does not return to normal or if breathing difficulty resumes after 2-3 minutes, give another dose in the other nostril using a new spray   1 each 1    nitroglycerin (NITROSTAT) 0 4 mg SL tablet Place 1 tablet (0 4 mg total) under the tongue every 5 (five) minutes as needed for chest pain 25 tablet 1    nystatin (MYCOSTATIN) powder Apply topically 2 (two) times a day 30 g 1    OLANZapine (ZyPREXA) 5 mg tablet Take 1 tablet (5 mg total) by mouth daily at bedtime 90 tablet 0    ondansetron (ZOFRAN-ODT) 4 mg disintegrating tablet Take 1 tablet (4 mg total) by mouth every 8 (eight) hours as needed for nausea or vomiting 20 tablet 0    OneTouch Ultra test strip USE 1 EACH DAILY USE AS INSTRUCTED 100 strip 2    pantoprazole (PROTONIX) 40 mg tablet TAKE 1 TABLET BY MOUTH TWICE A  tablet 1    tiZANidine (ZANAFLEX) 4 mg tablet TAKE 1 TABLET (4 MG TOTAL) BY MOUTH EVERY 8 (EIGHT) HOURS AS NEEDED FOR MUSCLE SPASMS 270 tablet 1    torsemide 40 MG TABS Take 80 mg by mouth daily 30 tablet 0    [DISCONTINUED] gabapentin (NEURONTIN) 800 mg tablet TAKE 1 TABLET BY MOUTH 4 TIMES A DAY  90 tablet 0    [DISCONTINUED] oxygen gas Inhale 2 L/min continuous  Indications: Respiratory Failure       No current facility-administered medications on file prior to visit  Review of Systems   Constitutional: Positive for activity change and fatigue  Negative for chills and fever  HENT: Negative for ear pain and sore throat  Eyes: Negative for pain and visual disturbance  Respiratory: Negative for cough and shortness of breath  Cardiovascular: Negative for chest pain and palpitations  Gastrointestinal: Negative for abdominal pain and vomiting  Genitourinary: Positive for decreased urine volume and difficulty urinating  Negative for dysuria and hematuria  Musculoskeletal: Positive for arthralgias, back pain and myalgias  Skin: Negative for color change and rash  Neurological: Positive for weakness  Negative for seizures and syncope  Psychiatric/Behavioral: Positive for dysphoric mood and sleep disturbance  Negative for self-injury and suicidal ideas  The patient is nervous/anxious  All other systems reviewed and are negative  Objective:    /68 (BP Location: Left arm, Patient Position: Sitting, Cuff Size: Large)   Pulse 70   Temp 97 7 °F (36 5 °C) (Temporal)   Resp 18   Ht 5' 8" (1 727 m)   Wt 120 kg (265 lb 6 4 oz)   SpO2 97%   BMI 40 35 kg/m²     Physical Exam  Vitals and nursing note reviewed  Constitutional:       Appearance: She is ill-appearing  HENT:      Right Ear: External ear normal       Left Ear: External ear normal    Eyes:      General:         Right eye: No discharge  Left eye: No discharge  Cardiovascular:      Rate and Rhythm: Normal rate and regular rhythm  Pulmonary:      Effort: Pulmonary effort is normal  No respiratory distress  Comments: RCW catheter   Genitourinary:     Comments: +suprapubic catheter  Musculoskeletal:      Right lower leg: Edema present  Left lower leg: Edema present  Neurological:      Mental Status: She is alert and oriented to person, place, and time     Psychiatric:         Behavior: Behavior normal          CHERELLE Whaley  08/02/22  2:06 PM

## 2022-08-02 ENCOUNTER — PATIENT OUTREACH (OUTPATIENT)
Dept: FAMILY MEDICINE CLINIC | Facility: CLINIC | Age: 62
End: 2022-08-02

## 2022-08-02 PROBLEM — N17.9 AKI (ACUTE KIDNEY INJURY) (HCC): Status: RESOLVED | Noted: 2022-03-03 | Resolved: 2022-08-02

## 2022-08-02 PROBLEM — J42 CHRONIC BRONCHITIS (HCC): Status: RESOLVED | Noted: 2021-05-25 | Resolved: 2022-08-02

## 2022-08-02 RX ORDER — GABAPENTIN 100 MG/1
100 CAPSULE ORAL 2 TIMES DAILY
Qty: 60 CAPSULE | Refills: 0 | Status: SHIPPED | OUTPATIENT
Start: 2022-08-02 | End: 2022-08-31

## 2022-08-02 NOTE — ASSESSMENT & PLAN NOTE
Lab Results   Component Value Date    [id number] 4 430 08/03/2021     Continue levothyroxine 50 mcg daily

## 2022-08-02 NOTE — PROGRESS NOTES
I called the patient to follow up  She states she is doing well  She notes she is tolerating dialysis sessions and reports her most recent weight was 265  The patient denies any shortness of breath and notes her breathing is "a lot better"  She denies any leg edema and states she has been elevating often  The patient did not check her blood sugar today but states they have been stable  I congratulated her on lowering her A1C to 7 0  She notes visiting nurse is expected at the home today  The patient was seen by her PCP yesterday so I kept the call brief  The patient stated she is still planning on moving back down [address] but unsure of the time frame  She notes she was given the phone number for several dialysis centers to check out  The patient will call with any questions or concerns  She requested a reminder call for an upcoming appointment  Chart reviewed

## 2022-08-02 NOTE — ASSESSMENT & PLAN NOTE
Wt Readings from Last 3 Encounters:   08/01/22 120 kg (265 lb 6 4 oz)   07/19/22 122 kg (269 lb 13 5 oz)   07/06/22 136 kg (300 lb)       Required aggressive diuresis, renal function significantly decreased   Continue torsemide 80 mg daily   Follow up with cardiology

## 2022-08-02 NOTE — ASSESSMENT & PLAN NOTE
Lab Results   Component Value Date    EGFR 14 07/18/2022    EGFR 15 07/17/2022    EGFR 12 07/16/2022    CREATININE 3 33 (H) 07/18/2022    CREATININE 3 20 (H) 07/17/2022    CREATININE 3 86 (H) 07/16/2022     Dialysis at [facility]  RCW tunneled catheter with plans for permanent access   Continue follow up with nephrology   Referral to palliative care

## 2022-08-02 NOTE — ASSESSMENT & PLAN NOTE
Lab Results   Component Value Date    HGBA1C 7 0 (A) 08/01/2022      She can continue with Lantus 25 units bid and Humalog 10 units tid  Would like her to start CGM to monitor for hypoglycemia

## 2022-08-11 ENCOUNTER — TELEPHONE (OUTPATIENT)
Dept: FAMILY MEDICINE CLINIC | Facility: CLINIC | Age: 62
End: 2022-08-11

## 2022-08-11 NOTE — TELEPHONE ENCOUNTER
[facility] form received on 08/11/2022  to be completed by PCP  Copy made and placed in PCP folder  Forms to be delivered to PCP mailbox at assigned time      Order #: [id number]  Admit Date: 06/21/2022

## 2022-08-12 ENCOUNTER — TELEPHONE (OUTPATIENT)
Dept: FAMILY MEDICINE CLINIC | Facility: CLINIC | Age: 62
End: 2022-08-12

## 2022-08-12 NOTE — TELEPHONE ENCOUNTER
[facility] form received on 08/12/2022  to be completed by PCP   Copy made and placed in PCP folder      Forms to be delivered to PCP mailbox at assigned time      Order #: [id number]

## 2022-08-13 DIAGNOSIS — I10 HYPERTENSION, UNSPECIFIED TYPE: ICD-10-CM

## 2022-08-17 ENCOUNTER — PATIENT OUTREACH (OUTPATIENT)
Dept: FAMILY MEDICINE CLINIC | Facility: CLINIC | Age: 62
End: 2022-08-17

## 2022-08-17 RX ORDER — LOSARTAN POTASSIUM 25 MG/1
25 TABLET ORAL DAILY
Qty: 30 TABLET | Refills: 0 | Status: SHIPPED | OUTPATIENT
Start: 2022-08-17 | End: 2022-08-31

## 2022-08-17 NOTE — TELEPHONE ENCOUNTER
FAXED ON 08/17/22 TO [address] [place] [address] at [phone]  FAX CONFIRMATION RECEIVED  SCANNED INTO CHART

## 2022-08-17 NOTE — PROGRESS NOTES
I called the patient to remind her of her Cards appointment for tomorrow; she plans on attending  She notes her dialysis sessions are going well  I will continue to follow

## 2022-08-18 ENCOUNTER — OFFICE VISIT (OUTPATIENT)
Dept: CARDIOLOGY CLINIC | Facility: CLINIC | Age: 62
End: 2022-08-18
Payer: COMMERCIAL

## 2022-08-18 VITALS
HEART RATE: 76 BPM | BODY MASS INDEX: 39.19 KG/M2 | DIASTOLIC BLOOD PRESSURE: 56 MMHG | WEIGHT: 258.6 LBS | HEIGHT: 68 IN | SYSTOLIC BLOOD PRESSURE: 118 MMHG

## 2022-08-18 DIAGNOSIS — Z79.4 TYPE 2 DIABETES MELLITUS WITH STAGE 5 CHRONIC KIDNEY DISEASE NOT ON CHRONIC DIALYSIS, WITH LONG-TERM CURRENT USE OF INSULIN (HCC): ICD-10-CM

## 2022-08-18 DIAGNOSIS — I47.29 NSVT (NONSUSTAINED VENTRICULAR TACHYCARDIA): ICD-10-CM

## 2022-08-18 DIAGNOSIS — I50.43 ACUTE ON CHRONIC COMBINED SYSTOLIC AND DIASTOLIC CONGESTIVE HEART FAILURE (HCC): Primary | ICD-10-CM

## 2022-08-18 DIAGNOSIS — N18.5 TYPE 2 DIABETES MELLITUS WITH STAGE 5 CHRONIC KIDNEY DISEASE NOT ON CHRONIC DIALYSIS, WITH LONG-TERM CURRENT USE OF INSULIN (HCC): ICD-10-CM

## 2022-08-18 DIAGNOSIS — E11.22 TYPE 2 DIABETES MELLITUS WITH STAGE 5 CHRONIC KIDNEY DISEASE NOT ON CHRONIC DIALYSIS, WITH LONG-TERM CURRENT USE OF INSULIN (HCC): ICD-10-CM

## 2022-08-18 DIAGNOSIS — I10 HYPERTENSION, UNSPECIFIED TYPE: ICD-10-CM

## 2022-08-18 DIAGNOSIS — E78.2 MIXED HYPERLIPIDEMIA: ICD-10-CM

## 2022-08-18 DIAGNOSIS — I25.10 CORONARY ARTERY DISEASE INVOLVING NATIVE CORONARY ARTERY OF NATIVE HEART WITHOUT ANGINA PECTORIS: ICD-10-CM

## 2022-08-18 DIAGNOSIS — E66.01 MORBID OBESITY WITH BMI OF 45.0-49.9, ADULT (HCC): ICD-10-CM

## 2022-08-18 DIAGNOSIS — N18.6 ESRD (END STAGE RENAL DISEASE) (HCC): ICD-10-CM

## 2022-08-18 PROCEDURE — 99214 OFFICE O/P EST MOD 30 MIN: CPT

## 2022-08-18 NOTE — PROGRESS NOTES
Cardiology   Brown Bale, MD Radene Sandifer, MD Lus Mustache, DO, JESSICA Bone MD Genaro Davies, DO, Dania Tsang DO, Formerly [facility]  -------------------------------------------------------------------  [facility]  [address], [place] [id number] [phone]  [phone]  08/18/22  Cash Garcia  YOB: 1960   MRN: [id number]      Referring Physician: Jeannie Coburn, [address] [place] [address]     HPI: Cash Garcia is a 63 y o  female with:   Chronic combined systolic and diastolic heart failure   LVEF 38%, moderate LVH, mild LA dilated, AV sclerosis, trace AR, mild MR, MV sclerosis, mild TR, June 2022   CAD with multiple PCIs   NSTEMI s/p JANET-mLCx w/ small 95% pRCA and diffuse moderate disease of LAD, July 2022   s/p -pRCA, August 2021   s/p JANET-mLAD, JANET-D1 and JANET-LCx, December 2012   Recent Acute renal failure on chronic kidney disease   Recent acute enteritis   Ischemic cardiomyopathy with moderate to markedly reduced LV systolic function   Dyslipidemia   Morbid obesity   CKD stage 4   Anemia of chronic kidney disease   Type 2 diabetes mellitus   PCOS   Fibromyalgia   Neurogenic bladder with suprapubic catheter in place   Obstructive sleep apnea, uncorrected   Syncope with orthostatic hypotension   Mild pulmonary hypertension       She presents today for follow-up  She was recently hospitalized with acute on chronic systolic and diastolic heart failure, her edema has significantly improved, we continues to improve  Blood pressure has been borderline low which is causing some concern at dialysis  Otherwise she feels well, denies any chest pain today  She is about to move to [facility] in early September    Review of Systems   Constitutional: Negative for chills and fever  HENT: Negative for facial swelling and sore throat      Eyes: Negative for visual disturbance  Respiratory: Negative for cough, chest tightness, shortness of breath and wheezing  Cardiovascular: Negative for chest pain, palpitations and leg swelling  Gastrointestinal: Negative for abdominal pain, blood in stool, constipation, diarrhea, nausea and vomiting  Endocrine: Negative for cold intolerance and heat intolerance  Genitourinary: Negative for decreased urine volume, difficulty urinating, dysuria and hematuria  Musculoskeletal: Negative for arthralgias, back pain and myalgias  Skin: Negative for rash  Neurological: Negative for dizziness, syncope, weakness and numbness  Psychiatric/Behavioral: Negative for agitation, behavioral problems and confusion  The patient is not nervous/anxious  OBJECTIVE  Vitals:    08/18/22 1401   BP: 118/56   Pulse: 76       Physical Exam  Constitutional: awake, alert and oriented, in no acute distress, no obvious deformities, obese female  Head: Normocephalic, without obvious abnormality, atraumatic  Eyes: conjunctivae clear and moist  Sclera anicteric  No xanthelasmas  Pupils equal bilaterally  Extraocular motions are full  Ear nose mouth and throat: ears are symmetrical bilaterally, hearing appears to be equal bilaterally, no nasal discharge or epistaxis, oropharynx is clear with moist mucous membranes  Neck:  Trachea is midline, neck is supple, no thyromegaly or significant lymphadenopathy, there is full range of motion  Lungs: clear to auscultation bilaterally, no wheezes, no rales, no rhonchi, no accessory muscle use, breathing is nonlabored  Heart: regular rate and rhythm, S1, S2 normal, no murmur, no click, no rub and no gallop, trace lower extremity edema  Abdomen:  Obese, soft, non-tender; bowel sounds normal; no masses,  no organomegaly  Psychiatric:  Patient is oriented to time, place, person, mood/affect is negative for depression, anxiety, agitation, appears to have appropriate insight  Skin: Skin is warm, dry, intact   No obvious rashes or lesions on exposed extremities  Nail beds are pink with no cyanosis or clubbing  :  She has a suprapubic catheter    EKG  No results found for this visit on 08/18/22       IMPRESSION:  Chronic combined systolic and diastolic heart failure   LVEF 38%, moderate LVH, mild LA dilated, AV sclerosis, trace AR, mild MR, MV sclerosis, mild TR, June 2022   CAD with multiple PCIs   NSTEMI s/p JANET-mLCx w/ small 95% pRCA and diffuse moderate disease of LAD, July 2022   s/p -pRCA, August 2021   s/p JANET-mLAD, JANET-D1 and JANET-LCx, December 2012   Recent Acute renal failure on chronic kidney disease   Recent acute enteritis   Ischemic cardiomyopathy with moderate to markedly reduced LV systolic function   Dyslipidemia   Morbid obesity   CKD stage 4   Anemia of chronic kidney disease   Type 2 diabetes mellitus   PCOS   Fibromyalgia   Neurogenic bladder with suprapubic catheter in place   Obstructive sleep apnea, uncorrected   Syncope with orthostatic hypotension   Mild pulmonary hypertension       DISCUSSION/RECOMMENDATIONS:  She is stable at this time from cardiac standpoint   Continue with dual antiplatelet therapy with aspirin Plavix given recent stent placement to the circumflex artery in July of 2022   Her blood pressure has been borderline low, can stop amlodipine, continue with bisoprolol at 2 5 mg dose, she is currently taking 5 mg   Asked her to split up the torsemide to a m  and p m  40 mg each as opposed to full 80 mg in the morning  Will hold off on further cardiac testing at this time   She will need to establish with new cardiologist in [facility]  --------------------------------------------------------------------------------  TREADMILL STRESS  No results found for this or any previous visit      ----------------------------------------------------------------------------------------------  NUCLEAR STRESS TEST: No results found for this or any previous visit  Results for orders placed during the hospital encounter of 22    NM myocardial perfusion spect (rx stress and/or rest)    Interpretation Summary  1  Nondiagnostic ECG portion of stress test due to presence of left bundle-branch block abnormality  No angina and no new ST T-wave abnormalities were noted during the stress test   2  Abnormal myocardial perfusion scan  There were multiple perfusion abnormalities involving the inferolateral and lateral wall and anterior and anteroseptal walls  Cannot exclude ischemia involving multiple vessels (triple-vessel and left main disease)  Imaging portion of the stress test is affected by significant soft tissue and diaphragmatic attenuation artifact  3  Dilated left ventricular cavity with moderate to markedly reduced left ventricular systolic function and regional wall motion abnormalities  Post-stress ejection fraction was calculated as 32% although visually it was around 40%  4  Elevated resting blood pressure with appropriate blood pressure response noted during stress test   5  Abnormal baseline ECG with left bundle-branch block  No significant changes and no significant arrhythmia noted during stress test     There is no prior study for comparison  Clinical correlation is recommended  --------------------------------------------------------------------------------  CATH:  Results for orders placed during the hospital encounter of 21    Cardiac catheterization    Narrative  [address]    [place], [address]  [phone]    [facility]    Invasive Cardiovascular Lab Complete Report    Patient: Imani Ayala  MR number: [id number]  Account number: [de-identified]  Study date: 2021  Gender: Female  : 1960  Height: 68 1 in  Weight: 279 4 lb  BSA: 2 36 mï¾²    Allergies: CODEINE, LATEX    Diagnostic Cardiologist:  Ananth Laura MD  Primary Physician:  Amado Castro CARRIE BAUTISTA    CORONARY CIRCULATION:  Proximal RCA: There was a 100 % stenosis  This lesion is a chronic total occlusion  INDICATIONS:  --  Possible CAD: myocardial infarction without ST elevation (NSTEMI)  --  Congestive heart failure with ischemic cardiomyopathy  PROCEDURES PERFORMED    --  Left heart catheterization without ventriculogram   --  Left coronary angiography  --  Right coronary angiography  --  Inpatient  --  Mod Sedation Same Physician Initial 15min  --  Coronary Catheterization (w/ LHC)  PROCEDURE: The risks and alternatives of the procedures and conscious sedation were explained to the patient and informed consent was obtained  The patient was brought to the cath lab and placed on the table  The planned puncture sites  were prepped and draped in the usual sterile fashion  --  Right radial artery access  After performing an Erasto's test to verify adequate ulnar artery supply to the hand, the radial site was prepped  The puncture site was infiltrated with local anesthetic  The vessel was accessed using the  modified Seldinger technique, a wire was advanced into the vessel, and a sheath was advanced over the wire into the vessel  --  Left heart catheterization without ventriculogram  A catheter was advanced over a guidewire into the ascending aorta  After recording ascending aortic pressure, the catheter was advanced across the aortic valve and left ventricular  pressure was recorded  The catheter was pulled back across the aortic valve and into the ascending aorta and pullback pressures were obtained  --  Left coronary artery angiography  A catheter was advanced over a guidewire into the aorta and positioned in the left coronary artery ostium under fluoroscopic guidance  Angiography was performed  --  Right coronary artery angiography  A catheter was advanced over a guidewire into the aorta and positioned in the right coronary artery ostium under fluoroscopic guidance  Angiography was performed  --  Inpatient  --  Mod Sedation Same Physician Initial 15min  --  Coronary Catheterization (/ [facility])  PROCEDURE COMPLETION: The patient tolerated the procedure well and was discharged from the cath lab  TIMING: Test started at 14:33  Test concluded at 14:45  HEMOSTASIS: The sheath was removed  The site was compressed with a Hemoband  device  Hemostasis was obtained  MEDICATIONS GIVEN: Fentanyl (1OOmcg/2 ml), 50 mcg, IV, at 14:30  Versed (2mg/2ml), 2 mg, IV, at 14:30  Zofran (Ondansetron), 4 mg, IV, at 14:34  1% Lidocaine, 2 ml, subcutaneously, at 14:35  Nitroglycerin  (200mcg/ml), 200 mcg, at 14:37  Verapamil (5mg/2ml), 5 mg, IV, at 14:37  Heparin 1000 units/ml, 4,000 units, IV, at 14:37  CONTRAST GIVEN: 30 ml Visipaque (320mg I /ml)  RADIATION EXPOSURE: Fluoroscopy time: 1 32 min  HEMODYNAMICS: Hemodynamic assessment demonstrated normal LVEDP  12-14    CORONARY VESSELS:   --  The coronary circulation is left dominant  --  Left main: The vessel was medium to large sized  Angiography showed mild atherosclerosis  --  LAD: The vessel was large sized  Angiography showed no evidence of disease  --  Proximal LAD: There was a discrete 30 % stenosis at the site of a prior stent  --  Mid LAD: There was a 0 % stenosis at the site of a prior stent  --  Distal LAD: There was a 0 % stenosis at the site of a prior stent  --  Proximal circumflex: There was a 0 % stenosis at the site of a prior stent  --  RCA: The vessel was small (non-dominant)  --  Proximal RCA: There was a 100 % stenosis  This lesion is a chronic total occlusion  IMPRESSIONS:  Type 2 MI  There is significant single vessel coronary artery disease  RECOMMENDATIONS  The patient should continue with the present medications  DISPOSITION:  The patient left the catheterization laboratory in stable condition      Prepared and signed by    Kilo Garcia MD  Signed 08/31/2021 14:55:54    Study diagram    Angiographic findings  Native coronary lesions:  ï¾·Proximal LAD: Lesion 1: discrete, 30 % stenosis, site of prior stent  ï¾·Mid LAD: Lesion 1: 0 % stenosis, site of prior stent  ï¾·Distal LAD: Lesion 1: 0 % stenosis, site of prior stent  ï¾·Proximal circumflex: Lesion 1: 0 % stenosis, site of prior stent  ï¾·Proximal RCA: Lesion 1: 100 % stenosis  Hemodynamic tables    Pressures:  Baseline  Pressures:  - HR: 66  Pressures:  - Rhythm:  Pressures:  -- Aortic Pressure (S/D/M): 153/61/76  Pressures:  -- Left Ventricle (s/edp): 139/12/--  Pressures:  -- Left Ventricle (s/edp): 139/19/--    Outputs:  Baseline  Outputs:  -- CALCULATIONS: [age]  Outputs:  -- CALCULATIONS: Body Surface Area: 2 36  Outputs:  -- CALCULATIONS: Height in cm: 173 00  Outputs:  -- CALCULATIONS: Sex: Female  Outputs:  -- CALCULATIONS: Weight in k 00    --------------------------------------------------------------------------------  ECHO:   Results for orders placed during the hospital encounter of 21    Echo complete with contrast if indicated    Narrative  [facility] [employer] [address]    Transthoracic Echocardiogram  2D, M-mode, Doppler, and Color Doppler    Study date:  24-Aug-2021    Patient: Maricruz Borja  MR number: [id number]  Account number: [de-identified]  : 1960  Age: 64 years  Gender: Female  Status: Inpatient  Location: [place]   Height: 68 in  Weight: 312 4 lb  BP: 149/ 79 mmHg    Indications: Heart failure    Diagnoses: I50 9 - Heart failure, unspecified    Sonographer:  CASANDRA Wade  Interpreting Physician:  NICOLE Flores  Primary Physician:  Dov Sandhu MD  Referring Physician:  CHERELLE Ortiz  Group:  [facility]    SUMMARY    LEFT VENTRICLE:  Systolic function was mildly reduced by visual assessment  Ejection fraction was estimated to be 45 %    There were regional wall motion abnormalities that suggest coronary artery disease  There was severe hypokinesis of the basal-mid anteroseptal and basal-mid inferoseptal wall(s)  There was moderate hypokinesis of the basal-mid inferior wall(s)  Wall thickness was mildly increased  There was mild concentric hypertrophy  Features were consistent with grade 2 diastolic dysfunction  Doppler parameters were consistent with high ventricular filling pressure  E/E' was > 19    VENTRICULAR SEPTUM:  There was dyssynergic motion  These changes are consistent with LBBB  MITRAL VALVE:  There was mild "progressive" mitral stenosis  Mean gradient of 5 mmHg at 73 bpm  MVA by Doppler continuity equation is 2 15 cm2  TRICUSPID VALVE:  There was mild regurgitation  PULMONIC VALVE:  There was trace regurgitation  PERICARDIUM:  A trace pericardial effusion was identified  HISTORY: PRIOR HISTORY: CAD, CKD Risk factors: hypertension and diabetes  PROCEDURE: The study was performed in the [place] [address]  This was a routine study  The transthoracic approach was used  The study included complete 2D imaging, M-mode, complete spectral Doppler, and color Doppler  The heart rate was 76  bpm, at the start of the study  Images were obtained from the parasternal, apical, subcostal, and suprasternal notch acoustic windows  Image quality was adequate  LEFT VENTRICLE: Size was normal  Systolic function was mildly reduced by visual assessment  Ejection fraction was estimated to be 45 %  There were regional wall motion abnormalities that suggest coronary artery disease  There was severe hypokinesis of the basal-mid anteroseptal and basal-mid inferoseptal wall(s)  There was moderate hypokinesis of the basal-mid inferior wall(s)  Wall thickness was mildly increased  There was mild concentric hypertrophy  DOPPLER: Features were consistent with grade 2 diastolic dysfunction  Doppler parameters were consistent with high ventricular filling pressure   E/E' was > 19    VENTRICULAR SEPTUM: There was dyssynergic motion  These changes are consistent with LBBB  RIGHT VENTRICLE: The size was normal  Systolic function was normal  Wall thickness was normal     LEFT ATRIUM: Size was within the limits of normal when indexed for body surface area  LA volume index 31 ml/m2  RIGHT ATRIUM: Size was normal     MITRAL VALVE: Valve structure was normal  There was mild thickening  There was normal leaflet separation  There was mildly restricted mobility of the anterior leaflet  DOPPLER: The transmitral velocity was within the normal range  There  was mild "progressive" mitral stenosis  Mean gradient of 5 mmHg at 73 bpm  MVA by Doppler continuity equation is 2 15 cm2  There was no regurgitation  AORTIC VALVE: The valve was trileaflet  Leaflets exhibited normal thickness and normal cuspal separation  DOPPLER: Transaortic velocity was within the normal range  There was no evidence for stenosis  There was no regurgitation  TRICUSPID VALVE: The valve structure was normal  There was normal leaflet separation  DOPPLER: The transtricuspid velocity was within the normal range  There was no evidence for stenosis  There was mild regurgitation  Estimated peak PA  pressure was 34 mmHg  PULMONIC VALVE: Not well visualized  DOPPLER: There was no evidence for stenosis  There was trace regurgitation  PERICARDIUM: A trace pericardial effusion was identified  The pericardium was normal in appearance  AORTA: The root exhibited normal size  SYSTEMIC VEINS: IVC: The inferior vena cava was normal in size and course   Respirophasic changes were normal     SYSTEM MEASUREMENT TABLES    2D  %FS: 25 15 %  Ao Diam: 2 77 cm  EDV(Teich): 136 76 ml  EF(Teich): 49 26 %  ESV(Teich): 69 39 ml  IVSd: 1 07 cm  LA Area: 30 26 cm2  LA Diam: 4 91 cm  LVEDV MOD A4C: 210 21 ml  LVEF MOD A4C: 46 53 %  LVESV MOD A4C: 112 41 ml  LVIDd: 5 32 cm  LVIDs: 3 99 cm  LVLd A4C: 9 67 cm  LVLs A4C: 8 21 cm  LVPWd: 1 1 cm  RA Area: 18 38 cm2  RVIDd: 3 28 cm  SV MOD A4C: 97 8 ml  SV(Teich): 67 38 ml    CW  TR Vmax: 2 78 m/s  TR maxP 99 mmHg    MM  TAPSE: 2 42 cm    PW  E' Sept: 0 06 m/s  E/E' Sept: 19 84  MV A Santosh: 1 49 m/s  MV Dec [place]: 9 08 m/s2  MV DecT: 137 82 ms  MV E Santosh: 1 25 m/s  MV E/A Ratio: 0 84  MV PHT: 39 97 ms  MVA By PHT: 5 5 cm2    [facility]    Prepared and electronically signed by    NICOLE Venegas  Signed 24-Aug-2021 14:54:43    No results found for this or any previous visit     --------------------------------------------------------------------------------  HOLTER  No results found for this or any previous visit      No results found for this or any previous visit     --------------------------------------------------------------------------------  CAROTIDS  No results found for this or any previous visit      --------------------------------------------------------------------------------  Diagnoses and all orders for this visit:    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 heart failure ([facility] )    Hypertension, unspecified type    Coronary artery disease involving native coronary artery of native heart without angina pectoris    Type 2 diabetes mellitus with stage 5 chronic kidney disease not on chronic dialysis, with long-term current use of insulin ([facility])    NSVT (nonsustained ventricular tachycardia) (HCC)    ESRD (end stage renal disease) ([facility] )    Morbid obesity with BMI of 45 0-49 9, adult ([facility] )    Mixed hyperlipidemia     ======================================================    Past Medical History:   Diagnosis Date    Abnormal liver function     Anemia     Anxiety     Arthritis     Chronic kidney disease     stage 3    Chronic narcotic dependence (HCC)     Chronic pain disorder     lower back, hands , neck and knees    Coronary artery disease     Depression     Diabetes mellitus ([facility] )     Elevated troponin 2/11/2022    GERD (gastroesophageal reflux disease)     no meds at present    Heart murmur     murmur    Hyperlipidemia     Hypertension     Neurogenic bladder     Open toe wound 12/2020    right big toe open calus but is dry at present    Renal disorder     Shortness of breath     exertion    Sleep apnea     doesn't use cpap    Suprapubic catheter [facility])      Past Surgical History:   Procedure Laterality Date    AMPUTATION      ANGIOPLASTY  2017    5    BREAST EXCISIONAL BIOPSY Left     BREAST SURGERY      CARDIAC CATHETERIZATION      CARDIAC CATHETERIZATION N/A 7/1/2022    Procedure: Cardiac catheterization;  Surgeon: Catherine Cantu MD;  Location: [facility]ARD[facility]; Service: Cardiology    CARDIAC CATHETERIZATION N/A 7/1/2022    Procedure: Cardiac pci;  Surgeon: Catherine Cantu MD;  Location: [facility];   Service: Cardiology    CARPAL TUNNEL RELEASE Bilateral     CERVICAL FUSION      HYSTERECTOMY  2008    IR SUPRAPUBIC CATHETER PLACEMENT  6/15/2021    IR TUNNELED DIALYSIS CATHETER PLACEMENT  7/15/2022    KNEE SURGERY      OOPHORECTOMY  2008    MT EXC SKIN BENIG 3 1-4 CM REMAINDR BODY N/A 12/21/2020    Procedure: EXCISION SEBACEOUS CYST X 5 SCALP;  Surgeon: Hitesh Laird MD;  Location: [address] [place] [address];  Service: General    TOE AMPUTATION Left     TRIGGER FINGER RELEASE Right     4th Finger         Medications  Current Outpatient Medications   Medication Sig Dispense Refill    allopurinol (ZYLOPRIM) 300 mg tablet Take 1 tablet (300 mg total) by mouth daily 90 tablet 1    aspirin (ECOTRIN LOW STRENGTH) 81 mg EC tablet Take 1 tablet (81 mg total) by mouth daily 90 tablet 1    atorvastatin (LIPITOR) 40 mg tablet Take 1 tablet (40 mg total) by mouth daily 90 tablet 1    bisoprolol (ZEBETA) 5 mg tablet Take 0 5 tablets (2 5 mg total) by mouth daily 90 tablet 0    citalopram (CeleXA) 40 mg tablet Take 1 tablet (40 mg total) by mouth daily 90 tablet 1    clopidogrel (PLAVIX) 75 mg tablet Take 1 tablet (75 mg total) by mouth daily 90 tablet 1    docusate sodium (COLACE) 50 mg capsule Take 1 capsule (50 mg total) by mouth 2 (two) times a day 180 capsule 0    Dulaglutide (Trulicity) 1 5 XT/4 2VU SOPN Inject 0 5 mL (1 5 mg total) under the skin once a week 6 mL 1    ferrous sulfate 325 (65 Fe) mg tablet TAKE 1 TABLET BY MOUTH EVERY OTHER DAY 45 tablet 0    gabapentin (Neurontin) 100 mg capsule Take 1 capsule (100 mg total) by mouth 2 (two) times a day 60 capsule 0    insulin glargine (Lantus SoloStar) 100 units/mL injection pen Inject 25 Units under the skin every 12 (twelve) hours 30 mL 0    insulin lispro (HumaLOG) 100 units/mL injection pen Inject 10 Units under the skin 3 (three) times a day with meals 15 mL 0    Insulin Pen Needle (BD Pen Needle Micro U/F) 32G X 6 MM MISC Use 5 (five) times a day 200 each 5    Insulin Syringe-Needle U-100 (BD Veo Insulin Syringe U/F) 31G X 15/64" 0 5 ML MISC Inject under the skin 3 (three) times a day 100 each 2    isosorbide mononitrate (IMDUR) 30 mg 24 hr tablet Take 1 tablet (30 mg total) by mouth every evening 30 tablet 0    isosorbide mononitrate (IMDUR) 60 mg 24 hr tablet TAKE 1 TABLET BY MOUTH EVERY DAY 90 tablet 2    Lancets (OneTouch Delica Plus [id number]) MISC USE TO TEST 3 TIMES DAILY 100 each 2    levothyroxine 50 mcg tablet TAKE 1 TABLET BY MOUTH EVERY DAY 60 tablet 0    losartan (COZAAR) 25 mg tablet TAKE 1 TABLET (25 MG TOTAL) BY MOUTH DAILY  30 tablet 0    naloxone (NARCAN) 4 mg/0 1 mL nasal spray Administer 1 spray into a nostril  If breathing does not return to normal or if breathing difficulty resumes after 2-3 minutes, give another dose in the other nostril using a new spray   1 each 1    nitroglycerin (NITROSTAT) 0 4 mg SL tablet Place 1 tablet (0 4 mg total) under the tongue every 5 (five) minutes as needed for chest pain 25 tablet 1    nystatin (MYCOSTATIN) powder Apply topically 2 (two) times a day 30 g 1    OLANZapine (ZyPREXA) 5 mg tablet Take 1 tablet (5 mg total) by mouth daily at bedtime 90 tablet 0    ondansetron (ZOFRAN-ODT) 4 mg disintegrating tablet Take 1 tablet (4 mg total) by mouth every 8 (eight) hours as needed for nausea or vomiting 20 tablet 0    OneTouch Ultra test strip USE 1 EACH DAILY USE AS INSTRUCTED 100 strip 2    oxyCODONE (ROXICODONE) 5 immediate release tablet Take 1 tablet (5 mg total) by mouth every 8 (eight) hours as needed for moderate pain Max Daily Amount: 15 mg 50 tablet 0    pantoprazole (PROTONIX) 40 mg tablet TAKE 1 TABLET BY MOUTH TWICE A  tablet 1    tiZANidine (ZANAFLEX) 4 mg tablet TAKE 1 TABLET (4 MG TOTAL) BY MOUTH EVERY 8 (EIGHT) HOURS AS NEEDED FOR MUSCLE SPASMS 270 tablet 1    torsemide 40 MG TABS Take 80 mg by mouth daily 30 tablet 0     No current facility-administered medications for this visit          Allergies   Allergen Reactions    Codeine      Other reaction(s): Nausea and Vomiting    Latex Itching       Social History     Socioeconomic History    Marital status:      Spouse name: Not on file    Number of children: Not on file    Years of education: Not on file    Highest education level: Not on file   Occupational History    Not on file   Tobacco Use    Smoking status: Former Smoker     Packs/day: 1 00     Years: 35 00     Pack years: 35 00     Types: Cigarettes     Quit date: 2012     Years since quitting: 10 6    Smokeless tobacco: Never Used   Vaping Use    Vaping Use: Never used   Substance and Sexual Activity    Alcohol use: Not Currently    Drug use: Never    Sexual activity: Not Currently   Other Topics Concern    Not on file   Social History Narrative    Not on file     Social Determinants of Health     Financial Resource Strain: Low Risk     Difficulty of Paying Living Expenses: Not hard at all   Food Insecurity: No Food Insecurity    Worried About [address] in the Last Year: Never true    [address] in the Last Year: Never true   Transportation Needs: No Transportation Needs    Lack of Transportation (Medical): No    Lack of Transportation (Non-Medical):  No   Physical Activity: Not on file   Stress: Not on file   Social Connections: Not on file   Intimate Partner Violence: Not on file   Housing Stability: Low Risk     Unable to Pay for Housing in the Last Year: No    Number of Places Lived in the Last Year: 1    Unstable Housing in the Last Year: No        Family History   Problem Relation Age of Onset    Stroke Father     Heart disease Father     No Known Problems Mother     No Known Problems Sister     No Known Problems Daughter     No Known Problems Maternal Grandmother     No Known Problems Maternal Grandfather     No Known Problems Paternal Grandmother     No Known Problems Paternal Grandfather     No Known Problems Maternal Aunt     No Known Problems Maternal Aunt     No Known Problems Maternal Aunt     No Known Problems Maternal Aunt     No Known Problems Maternal Aunt     No Known Problems Maternal Aunt     No Known Problems Paternal Aunt        Lab Results   Component Value Date    WBC 6 39 07/18/2022    HGB 8 2 (L) 07/18/2022    HCT 26 2 (L) 07/18/2022     (H) 07/18/2022     07/18/2022      Lab Results   Component Value Date    SODIUM 139 07/18/2022    K 3 3 (L) 07/18/2022     07/18/2022    CO2 28 07/18/2022    BUN 75 (H) 07/18/2022    CREATININE 3 33 (H) 07/18/2022    GLUC 147 (H) 07/18/2022    CALCIUM 7 9 (L) 07/18/2022      Lab Results   Component Value Date    HGBA1C 7 0 (A) 08/01/2022      No results found for: CHOL  Lab Results   Component Value Date    HDL 42 (L) 06/24/2022    HDL 30 (L) 08/03/2021    HDL 45 12/18/2020     Lab Results   Component Value Date    LDLCALC 50 06/24/2022    LDLCALC 86 08/03/2021    LDLCALC 122 12/18/2020     Lab Results   Component Value Date    TRIG 138 06/24/2022    TRIG 180 (H) 08/03/2021    TRIG 162 (H) 12/18/2020     No results found for:  CHOLHDL   Lab Results   Component Value Date    INR 1 05 07/06/2022    INR 1 21 (H) 06/22/2022    INR 1 03 08/23/2021    PROTIME 13 4 07/06/2022    PROTIME 14 9 (H) 06/22/2022    PROTIME 13 6 08/23/2021          Patient Active Problem List    Diagnosis Date Noted    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 heart failure ([facility] ) 08/24/2021    Prolonged QT interval 08/24/2021    Urinary tract infection associated with cystostomy catheter ([facility] ) 08/23/2021    Neurogenic bladder 08/23/2021    Morbid obesity with BMI of 45 0-49 9, adult ([facility] ) 06/15/2021    History of heart artery stent 06/15/2021    Memory impairment 05/26/2021    Severe episode of recurrent major depressive disorder, without psychotic features ([facility] ) 05/25/2021    Skin ulcer of hand, limited to breakdown of skin ([place] [address] ) 02/25/2021    HTN (hypertension) 12/21/2020    Diabetic ulcer of toe of right foot associated with type 2 diabetes mellitus, with muscle involvement without evidence of necrosis [facility]  ) 11/25/2020    Head lump 11/11/2020    Anemia 09/09/2020    Abnormal LFTs 09/09/2020    S/P cervical spinal fusion 09/09/2020    Major depressive disorder 09/02/2020    Type 2 diabetes mellitus with stage 5 chronic kidney disease not on chronic dialysis, with long-term current use of insulin ([place] [address] ) 09/02/2020    Anxiety 09/02/2020    CAD (coronary artery disease) 09/02/2020    Osteoarthritis of left knee 09/02/2020    Cellulitis of finger of right hand 08/26/2020    NSVT (nonsustained ventricular tachycardia) ([place] [address] ) 07/08/2022    Hyponatremia 06/23/2022    History of anemia due to chronic kidney disease 06/23/2022    Bradycardia 06/23/2022    Syncope 06/22/2022    Acute renal failure superimposed on chronic kidney disease ([facility] ) 06/22/2022    Chest pain 06/22/2022    Elevated troponin I level 06/22/2022    Hypotension 06/22/2022    Encounter for examination following treatment at hospital 02/22/2022    Other artificial openings of urinary tract status ([facility] ) 02/10/2022    Continuous opioid dependence ([facility] ) 02/10/2022    Adrenal nodule ([facility] ) 02/10/2022    Stable angina ([facility] ) 02/10/2022    Volume overload 02/10/2022    Acquired hypothyroidism 10/14/2021    Mixed hyperlipidemia 10/14/2021    Gastroesophageal reflux disease without esophagitis 10/13/2021    Other proteinuria 09/14/2021    ESRD (end stage renal disease) ([facility] ) 06/04/2021       Portions of the record may have been created with voice recognition software  Occasional wrong word or "sound a like" substitutions may have occurred due to the inherent limitations of voice recognition software  Read the chart carefully and recognize, using context, where substitutions have occurred      Julio Ramesh DO, [facility]  8/18/2022 2:42 PM

## 2022-08-22 ENCOUNTER — TELEPHONE (OUTPATIENT)
Dept: FAMILY MEDICINE CLINIC | Facility: CLINIC | Age: 62
End: 2022-08-22

## 2022-08-22 NOTE — TELEPHONE ENCOUNTER
PCP [facility] FORM RECEIVED VIA FAX AND PLACED IN PCP FOLDER TO BE DELIVERED AT ASSIGNED TIMES

## 2022-08-23 ENCOUNTER — TELEPHONE (OUTPATIENT)
Dept: PALLIATIVE MEDICINE | Facility: CLINIC | Age: 62
End: 2022-08-23

## 2022-08-23 ENCOUNTER — TELEPHONE (OUTPATIENT)
Dept: FAMILY MEDICINE CLINIC | Facility: CLINIC | Age: 62
End: 2022-08-23

## 2022-08-23 NOTE — TELEPHONE ENCOUNTER
PCP [address]  FORM RECEIVED VIA FAX AND PLACED IN PCP FOLDER TO BE DELIVERED AT ASSIGNED TIMES        Order # [id number]

## 2022-08-23 NOTE — TELEPHONE ENCOUNTER
Reached out to patient no answer left message to give the office a call back to talk about Palliative care  Palliative Care is a specialty that helps take care of pts with complex medical conditions, that are often life limiting  [facility] provides supportive care along with other specialists and Primary Care providers  We can offer help with:  -Creating a living will/ACP  -Establishing a POA  -Discussing Goals of Care    Palliative Care is not the same as Hospice  [facility] can be [facility] or long term as we offer symptom management related to advanced disease or side effects of tx  Common dx/sx: Active CA or symptoms from tx   -Pain   -N/V   -Diarrhea/Constipation   -Neuropathy   -Anxiety/Depression   -Sleeplessness   -Loss of Appetite    Severe Heart disease: Congestive heart failure, Coronary artery disease (dx must be reviewed by office nurse)   -Severe SOB   -Chest pain   -02 Dependence    Severe lung disease: COPD, pulmonary fibrosis, pulmonary hypertension   -Severe SOB   -Active limitations   -02 Dependence    Severe Kidney Disease: Stage 4 or 5, patient on dialysis or considering dialysis   -GOC -Advanced Directives   -5 wishes   -Living Will   -Health Care Proxy    Severe Liver disease   -Abdominal swelling   -SOB    Neurological disease: Dementia, Multiple Sclerosis (dx must be reviewed by nurse), Parkinson's Disease, ALS, or Khushbu Gehrig's Disease   -Tremors   -anxiety   -sleeplessness   -appetite    We will not address Chronic Pain ONLY Active Cancer Pain

## 2022-08-25 ENCOUNTER — APPOINTMENT (OUTPATIENT)
Dept: RADIOLOGY | Facility: HOSPITAL | Age: 62
End: 2022-08-25
Payer: COMMERCIAL

## 2022-08-25 ENCOUNTER — TELEPHONE (OUTPATIENT)
Dept: FAMILY MEDICINE CLINIC | Facility: CLINIC | Age: 62
End: 2022-08-25

## 2022-08-25 ENCOUNTER — APPOINTMENT (EMERGENCY)
Dept: CT IMAGING | Facility: HOSPITAL | Age: 62
End: 2022-08-25
Payer: COMMERCIAL

## 2022-08-25 ENCOUNTER — HOSPITAL ENCOUNTER (INPATIENT)
Facility: HOSPITAL | Age: 62
LOS: 1 days | Discharge: HOME HEALTH | End: 2022-08-26
Attending: EMERGENCY MEDICINE | Admitting: INTERNAL MEDICINE
Payer: COMMERCIAL

## 2022-08-25 DIAGNOSIS — E11.9 DIABETES (HCC): ICD-10-CM

## 2022-08-25 DIAGNOSIS — R11.0 NAUSEA: Primary | ICD-10-CM

## 2022-08-25 DIAGNOSIS — I25.10 MULTIPLE VESSEL CORONARY ARTERY DISEASE: ICD-10-CM

## 2022-08-25 DIAGNOSIS — Z99.2 END STAGE RENAL DISEASE ON DIALYSIS (HCC): ICD-10-CM

## 2022-08-25 DIAGNOSIS — N18.6 END STAGE RENAL DISEASE ON DIALYSIS (HCC): ICD-10-CM

## 2022-08-25 DIAGNOSIS — R77.8 ELEVATED TROPONIN: ICD-10-CM

## 2022-08-25 LAB
2HR DELTA HS TROPONIN: -2 NG/L
ALBUMIN SERPL BCP-MCNC: 3.2 G/DL (ref 3.5–5)
ALP SERPL-CCNC: 112 U/L (ref 46–116)
ALT SERPL W P-5'-P-CCNC: 22 U/L (ref 12–78)
ANION GAP SERPL CALCULATED.3IONS-SCNC: 11 MMOL/L (ref 4–13)
AST SERPL W P-5'-P-CCNC: 18 U/L (ref 5–45)
ATRIAL RATE: 72 BPM
ATRIAL RATE: 76 BPM
ATRIAL RATE: 79 BPM
BACTERIA UR QL AUTO: ABNORMAL /HPF
BASOPHILS # BLD AUTO: 0.04 THOUSANDS/ΜL (ref 0–0.1)
BASOPHILS NFR BLD AUTO: 0 % (ref 0–1)
BILIRUB DIRECT SERPL-MCNC: 0.09 MG/DL (ref 0–0.2)
BILIRUB SERPL-MCNC: 0.48 MG/DL (ref 0.2–1)
BILIRUB UR QL STRIP: NEGATIVE
BUN SERPL-MCNC: 30 MG/DL (ref 5–25)
CALCIUM SERPL-MCNC: 9 MG/DL (ref 8.3–10.1)
CARDIAC TROPONIN I PNL SERPL HS: 92 NG/L
CARDIAC TROPONIN I PNL SERPL HS: 96 NG/L
CARDIAC TROPONIN I PNL SERPL HS: 98 NG/L
CHLORIDE SERPL-SCNC: 98 MMOL/L (ref 96–108)
CLARITY UR: ABNORMAL
CO2 SERPL-SCNC: 27 MMOL/L (ref 21–32)
COLOR UR: YELLOW
CREAT SERPL-MCNC: 2.36 MG/DL (ref 0.6–1.3)
EOSINOPHIL # BLD AUTO: 0.03 THOUSAND/ΜL (ref 0–0.61)
EOSINOPHIL NFR BLD AUTO: 0 % (ref 0–6)
ERYTHROCYTE [DISTWIDTH] IN BLOOD BY AUTOMATED COUNT: 14.6 % (ref 11.6–15.1)
FLUAV RNA RESP QL NAA+PROBE: NEGATIVE
FLUBV RNA RESP QL NAA+PROBE: NEGATIVE
GFR SERPL CREATININE-BSD FRML MDRD: 21 ML/MIN/1.73SQ M
GLUCOSE SERPL-MCNC: 403 MG/DL (ref 65–140)
GLUCOSE SERPL-MCNC: 412 MG/DL (ref 65–140)
GLUCOSE UR STRIP-MCNC: ABNORMAL MG/DL
HCT VFR BLD AUTO: 32.5 % (ref 34.8–46.1)
HGB BLD-MCNC: 11.3 G/DL (ref 11.5–15.4)
HGB UR QL STRIP.AUTO: ABNORMAL
IMM GRANULOCYTES # BLD AUTO: 0.03 THOUSAND/UL (ref 0–0.2)
IMM GRANULOCYTES NFR BLD AUTO: 0 % (ref 0–2)
KETONES UR STRIP-MCNC: ABNORMAL MG/DL
LEUKOCYTE ESTERASE UR QL STRIP: ABNORMAL
LIPASE SERPL-CCNC: 45 U/L (ref 73–393)
LYMPHOCYTES # BLD AUTO: 1.36 THOUSANDS/ΜL (ref 0.6–4.47)
LYMPHOCYTES NFR BLD AUTO: 15 % (ref 14–44)
MCH RBC QN AUTO: 33 PG (ref 26.8–34.3)
MCHC RBC AUTO-ENTMCNC: 34.8 G/DL (ref 31.4–37.4)
MCV RBC AUTO: 95 FL (ref 82–98)
MONOCYTES # BLD AUTO: 0.52 THOUSAND/ΜL (ref 0.17–1.22)
MONOCYTES NFR BLD AUTO: 6 % (ref 4–12)
NEUTROPHILS # BLD AUTO: 6.93 THOUSANDS/ΜL (ref 1.85–7.62)
NEUTS SEG NFR BLD AUTO: 79 % (ref 43–75)
NITRITE UR QL STRIP: NEGATIVE
NON-SQ EPI CELLS URNS QL MICRO: ABNORMAL /HPF
NRBC BLD AUTO-RTO: 0 /100 WBCS
P AXIS: 40 DEGREES
P AXIS: 44 DEGREES
P AXIS: 49 DEGREES
PH UR STRIP.AUTO: >=9 [PH] (ref 4.5–8)
PLATELET # BLD AUTO: 200 THOUSANDS/UL (ref 149–390)
PLATELET # BLD AUTO: 217 THOUSANDS/UL (ref 149–390)
PMV BLD AUTO: 8.7 FL (ref 8.9–12.7)
PMV BLD AUTO: 8.8 FL (ref 8.9–12.7)
POTASSIUM SERPL-SCNC: 4.3 MMOL/L (ref 3.5–5.3)
PR INTERVAL: 156 MS
PR INTERVAL: 158 MS
PR INTERVAL: 160 MS
PROT SERPL-MCNC: 7 G/DL (ref 6.4–8.4)
PROT UR STRIP-MCNC: >=300 MG/DL
QRS AXIS: -11 DEGREES
QRS AXIS: -12 DEGREES
QRS AXIS: 46 DEGREES
QRSD INTERVAL: 150 MS
QRSD INTERVAL: 158 MS
QRSD INTERVAL: 160 MS
QT INTERVAL: 420 MS
QT INTERVAL: 448 MS
QT INTERVAL: 452 MS
QTC INTERVAL: 481 MS
QTC INTERVAL: 494 MS
QTC INTERVAL: 504 MS
RBC # BLD AUTO: 3.42 MILLION/UL (ref 3.81–5.12)
RBC #/AREA URNS AUTO: ABNORMAL /HPF
RSV RNA RESP QL NAA+PROBE: NEGATIVE
SARS-COV-2 RNA RESP QL NAA+PROBE: NEGATIVE
SODIUM SERPL-SCNC: 136 MMOL/L (ref 135–147)
SP GR UR STRIP.AUTO: 1.01 (ref 1–1.03)
T WAVE AXIS: -26 DEGREES
T WAVE AXIS: 62 DEGREES
T WAVE AXIS: 83 DEGREES
TRI-PHOS CRY URNS QL MICRO: ABNORMAL /HPF
UROBILINOGEN UR QL STRIP.AUTO: 0.2 E.U./DL
VENTRICULAR RATE: 72 BPM
VENTRICULAR RATE: 76 BPM
VENTRICULAR RATE: 79 BPM
WBC # BLD AUTO: 8.91 THOUSAND/UL (ref 4.31–10.16)
WBC #/AREA URNS AUTO: ABNORMAL /HPF

## 2022-08-25 PROCEDURE — 84484 ASSAY OF TROPONIN QUANT: CPT | Performed by: EMERGENCY MEDICINE

## 2022-08-25 PROCEDURE — 82948 REAGENT STRIP/BLOOD GLUCOSE: CPT

## 2022-08-25 PROCEDURE — 96376 TX/PRO/DX INJ SAME DRUG ADON: CPT

## 2022-08-25 PROCEDURE — 96374 THER/PROPH/DIAG INJ IV PUSH: CPT

## 2022-08-25 PROCEDURE — 99285 EMERGENCY DEPT VISIT HI MDM: CPT

## 2022-08-25 PROCEDURE — 36415 COLL VENOUS BLD VENIPUNCTURE: CPT | Performed by: EMERGENCY MEDICINE

## 2022-08-25 PROCEDURE — 74176 CT ABD & PELVIS W/O CONTRAST: CPT

## 2022-08-25 PROCEDURE — 80048 BASIC METABOLIC PNL TOTAL CA: CPT | Performed by: EMERGENCY MEDICINE

## 2022-08-25 PROCEDURE — 85049 AUTOMATED PLATELET COUNT: CPT | Performed by: INTERNAL MEDICINE

## 2022-08-25 PROCEDURE — 99285 EMERGENCY DEPT VISIT HI MDM: CPT | Performed by: EMERGENCY MEDICINE

## 2022-08-25 PROCEDURE — 84484 ASSAY OF TROPONIN QUANT: CPT | Performed by: INTERNAL MEDICINE

## 2022-08-25 PROCEDURE — 80076 HEPATIC FUNCTION PANEL: CPT | Performed by: EMERGENCY MEDICINE

## 2022-08-25 PROCEDURE — 0241U HB NFCT DS VIR RESP RNA 4 TRGT: CPT | Performed by: EMERGENCY MEDICINE

## 2022-08-25 PROCEDURE — 93010 ELECTROCARDIOGRAM REPORT: CPT | Performed by: INTERNAL MEDICINE

## 2022-08-25 PROCEDURE — 99223 1ST HOSP IP/OBS HIGH 75: CPT | Performed by: INTERNAL MEDICINE

## 2022-08-25 PROCEDURE — 81001 URINALYSIS AUTO W/SCOPE: CPT

## 2022-08-25 PROCEDURE — G1004 CDSM NDSC: HCPCS

## 2022-08-25 PROCEDURE — 85025 COMPLETE CBC W/AUTO DIFF WBC: CPT | Performed by: EMERGENCY MEDICINE

## 2022-08-25 PROCEDURE — 83690 ASSAY OF LIPASE: CPT | Performed by: EMERGENCY MEDICINE

## 2022-08-25 PROCEDURE — 93005 ELECTROCARDIOGRAM TRACING: CPT

## 2022-08-25 PROCEDURE — 71045 X-RAY EXAM CHEST 1 VIEW: CPT

## 2022-08-25 RX ORDER — CLOPIDOGREL BISULFATE 75 MG/1
75 TABLET ORAL DAILY
Status: DISCONTINUED | OUTPATIENT
Start: 2022-08-26 | End: 2022-08-26 | Stop reason: HOSPADM

## 2022-08-25 RX ORDER — INSULIN LISPRO 100 [IU]/ML
1-5 INJECTION, SOLUTION INTRAVENOUS; SUBCUTANEOUS
Status: DISCONTINUED | OUTPATIENT
Start: 2022-08-26 | End: 2022-08-26 | Stop reason: HOSPADM

## 2022-08-25 RX ORDER — ISOSORBIDE MONONITRATE 30 MG/1
30 TABLET, EXTENDED RELEASE ORAL EVERY EVENING
Status: DISCONTINUED | OUTPATIENT
Start: 2022-08-25 | End: 2022-08-26 | Stop reason: HOSPADM

## 2022-08-25 RX ORDER — OXYCODONE HYDROCHLORIDE 5 MG/1
5 TABLET ORAL EVERY 8 HOURS PRN
Status: DISCONTINUED | OUTPATIENT
Start: 2022-08-25 | End: 2022-08-26 | Stop reason: HOSPADM

## 2022-08-25 RX ORDER — TORSEMIDE 20 MG/1
40 TABLET ORAL 2 TIMES DAILY
Status: DISCONTINUED | OUTPATIENT
Start: 2022-08-25 | End: 2022-08-26 | Stop reason: HOSPADM

## 2022-08-25 RX ORDER — INSULIN GLARGINE 100 [IU]/ML
20 INJECTION, SOLUTION SUBCUTANEOUS EVERY 12 HOURS SCHEDULED
Status: DISCONTINUED | OUTPATIENT
Start: 2022-08-25 | End: 2022-08-26

## 2022-08-25 RX ORDER — TORSEMIDE 20 MG/1
80 TABLET ORAL DAILY
Status: DISCONTINUED | OUTPATIENT
Start: 2022-08-26 | End: 2022-08-25

## 2022-08-25 RX ORDER — TIZANIDINE 4 MG/1
4 TABLET ORAL EVERY 8 HOURS PRN
Status: DISCONTINUED | OUTPATIENT
Start: 2022-08-25 | End: 2022-08-26 | Stop reason: HOSPADM

## 2022-08-25 RX ORDER — ONDANSETRON 2 MG/ML
4 INJECTION INTRAMUSCULAR; INTRAVENOUS ONCE
Status: COMPLETED | OUTPATIENT
Start: 2022-08-25 | End: 2022-08-25

## 2022-08-25 RX ORDER — CITALOPRAM 20 MG/1
40 TABLET ORAL DAILY
Status: DISCONTINUED | OUTPATIENT
Start: 2022-08-26 | End: 2022-08-26 | Stop reason: HOSPADM

## 2022-08-25 RX ORDER — OLANZAPINE 5 MG/1
5 TABLET ORAL
Status: DISCONTINUED | OUTPATIENT
Start: 2022-08-25 | End: 2022-08-26 | Stop reason: HOSPADM

## 2022-08-25 RX ORDER — ONDANSETRON 2 MG/ML
4 INJECTION INTRAMUSCULAR; INTRAVENOUS EVERY 6 HOURS PRN
Status: DISCONTINUED | OUTPATIENT
Start: 2022-08-25 | End: 2022-08-26 | Stop reason: HOSPADM

## 2022-08-25 RX ORDER — ISOSORBIDE MONONITRATE 60 MG/1
60 TABLET, EXTENDED RELEASE ORAL DAILY
Status: DISCONTINUED | OUTPATIENT
Start: 2022-08-26 | End: 2022-08-26 | Stop reason: HOSPADM

## 2022-08-25 RX ORDER — ATORVASTATIN CALCIUM 40 MG/1
40 TABLET, FILM COATED ORAL
Status: DISCONTINUED | OUTPATIENT
Start: 2022-08-25 | End: 2022-08-26 | Stop reason: HOSPADM

## 2022-08-25 RX ORDER — PANTOPRAZOLE SODIUM 40 MG/1
40 TABLET, DELAYED RELEASE ORAL 2 TIMES DAILY
Status: DISCONTINUED | OUTPATIENT
Start: 2022-08-25 | End: 2022-08-26 | Stop reason: HOSPADM

## 2022-08-25 RX ORDER — HEPARIN SODIUM 5000 [USP'U]/ML
5000 INJECTION, SOLUTION INTRAVENOUS; SUBCUTANEOUS EVERY 8 HOURS SCHEDULED
Status: DISCONTINUED | OUTPATIENT
Start: 2022-08-25 | End: 2022-08-26 | Stop reason: HOSPADM

## 2022-08-25 RX ORDER — LEVOTHYROXINE SODIUM 0.05 MG/1
50 TABLET ORAL
Status: DISCONTINUED | OUTPATIENT
Start: 2022-08-26 | End: 2022-08-26 | Stop reason: HOSPADM

## 2022-08-25 RX ORDER — ASPIRIN 81 MG/1
81 TABLET ORAL DAILY
Status: DISCONTINUED | OUTPATIENT
Start: 2022-08-26 | End: 2022-08-26 | Stop reason: HOSPADM

## 2022-08-25 RX ORDER — SODIUM CHLORIDE 9 MG/ML
100 INJECTION, SOLUTION INTRAVENOUS CONTINUOUS
Status: DISCONTINUED | OUTPATIENT
Start: 2022-08-25 | End: 2022-08-26

## 2022-08-25 RX ORDER — ALLOPURINOL 100 MG/1
300 TABLET ORAL DAILY
Status: DISCONTINUED | OUTPATIENT
Start: 2022-08-26 | End: 2022-08-26 | Stop reason: HOSPADM

## 2022-08-25 RX ORDER — BISOPROLOL FUMARATE 5 MG/1
2.5 TABLET, FILM COATED ORAL DAILY
Status: DISCONTINUED | OUTPATIENT
Start: 2022-08-26 | End: 2022-08-26 | Stop reason: HOSPADM

## 2022-08-25 RX ORDER — LOSARTAN POTASSIUM 25 MG/1
25 TABLET ORAL DAILY
Status: DISCONTINUED | OUTPATIENT
Start: 2022-08-26 | End: 2022-08-26 | Stop reason: HOSPADM

## 2022-08-25 RX ORDER — GABAPENTIN 100 MG/1
100 CAPSULE ORAL 2 TIMES DAILY
Status: DISCONTINUED | OUTPATIENT
Start: 2022-08-25 | End: 2022-08-26 | Stop reason: HOSPADM

## 2022-08-25 RX ADMIN — PANTOPRAZOLE SODIUM 40 MG: 40 TABLET, DELAYED RELEASE ORAL at 21:16

## 2022-08-25 RX ADMIN — INSULIN GLARGINE 20 UNITS: 100 INJECTION, SOLUTION SUBCUTANEOUS at 21:13

## 2022-08-25 RX ADMIN — TORSEMIDE 40 MG: 20 TABLET ORAL at 21:13

## 2022-08-25 RX ADMIN — ONDANSETRON 4 MG: 2 INJECTION INTRAMUSCULAR; INTRAVENOUS at 17:21

## 2022-08-25 RX ADMIN — GABAPENTIN 100 MG: 100 CAPSULE ORAL at 21:13

## 2022-08-25 RX ADMIN — ATORVASTATIN CALCIUM 40 MG: 40 TABLET, FILM COATED ORAL at 21:13

## 2022-08-25 RX ADMIN — ONDANSETRON 4 MG: 2 INJECTION INTRAMUSCULAR; INTRAVENOUS at 18:02

## 2022-08-25 RX ADMIN — OLANZAPINE 5 MG: 5 TABLET, FILM COATED ORAL at 21:13

## 2022-08-25 RX ADMIN — SODIUM CHLORIDE 100 ML/HR: 0.9 INJECTION, SOLUTION INTRAVENOUS at 23:12

## 2022-08-25 RX ADMIN — HEPARIN SODIUM 5000 UNITS: 5000 INJECTION INTRAVENOUS; SUBCUTANEOUS at 21:13

## 2022-08-25 RX ADMIN — ISOSORBIDE MONONITRATE 30 MG: 30 TABLET, EXTENDED RELEASE ORAL at 21:13

## 2022-08-25 NOTE — TELEPHONE ENCOUNTER
[address] FORM RECEIVED VIA FAX AND PLACED IN PCP FOLDER TO BE DELIVERED AT ASSIGNED TIMES        ORDER [id number]

## 2022-08-25 NOTE — TELEPHONE ENCOUNTER
Visiting nurse calling to report elevated BP out of her normal range and pt feeling nauseous with slight head pressure (pt's words she feels 'icky')    170/106    Due to pt complaints and medical history, I recommended she be taken to the ER for evaluation and call to follow up after

## 2022-08-25 NOTE — ED NOTES
Spoke to Yeimy Hartmann on [id number] about pt being on telemetry      Eileen Watson  08/25/22 1922

## 2022-08-25 NOTE — ED PROVIDER NOTES
History  Chief Complaint   Patient presents with    Nausea     Patient arrives ems coming from home c/o nausea and dizziness, patient denies vomiting and diarrhea  Patient dialysis M-W-F went yesterday without complications  Pt hx of htn  42-year-old female with past medical history significant for diabetes, end-stage renal disease on dialysis, multi-vessel coronary artery disease status post multiple PCIs, ischemic cardiomyopathy presented for evaluation of severe nausea along with some upper abdominal and lower chest discomfort/pressure  Patient states that she was feeling somewhat unwell yesterday but when she awoke this morning the nausea was much more severe  It has been constant throughout the day today without much change  She gets dialysis on Monday, Wednesday, Friday and did have a full dialysis session yesterday  Denies fevers  No localized numbness, weakness, tingling  History provided by:  Patient   used: No    Medical Problem  Location:  Upper abdomen/lower chest  Quality:  Severe nausea, mild pressure  Severity:  Moderate  Onset quality:  Gradual  Timing:  Constant  Progression:  Unchanged  Chronicity:  New  Context:  "I woke up like this"  Relieved by:  Nothing  Worsened by:  Nothing  Ineffective treatments:  None tried  Associated symptoms: abdominal pain, fatigue and nausea    Associated symptoms: no chest pain, no cough, no ear pain, no fever, no rash, no shortness of breath, no sore throat and no vomiting        Prior to Admission Medications   Prescriptions Last Dose Informant Patient Reported? Taking?    Dulaglutide (Trulicity) 1 5 PD/4 7AX SOPN   No No   Sig: Inject 0 5 mL (1 5 mg total) under the skin once a week   Insulin Pen Needle (BD Pen Needle Micro U/F) 32G X 6 MM MISC   No No   Sig: Use 5 (five) times a day   Insulin Syringe-Needle U-100 (BD Veo Insulin Syringe U/F) 31G X 15/64" 0 5 ML MISC   No No   Sig: Inject under the skin 3 (three) times a day Lancets (OneTouch Delica Plus [id number]) MISC  Self No No   Sig: USE TO TEST 3 TIMES DAILY   OLANZapine (ZyPREXA) 5 mg tablet   No No   Sig: Take 1 tablet (5 mg total) by mouth daily at bedtime   OneTouch Ultra test strip  Self No No   Sig: USE 1 EACH DAILY USE AS INSTRUCTED   allopurinol (ZYLOPRIM) 300 mg tablet   No No   Sig: Take 1 tablet (300 mg total) by mouth daily   aspirin (ECOTRIN LOW STRENGTH) 81 mg EC tablet   No No   Sig: Take 1 tablet (81 mg total) by mouth daily   atorvastatin (LIPITOR) 40 mg tablet   No No   Sig: Take 1 tablet (40 mg total) by mouth daily   bisoprolol (ZEBETA) 5 mg tablet   No No   Sig: Take 0 5 tablets (2 5 mg total) by mouth daily   citalopram (CeleXA) 40 mg tablet   No No   Sig: Take 1 tablet (40 mg total) by mouth daily   clopidogrel (PLAVIX) 75 mg tablet   No No   Sig: Take 1 tablet (75 mg total) by mouth daily   docusate sodium (COLACE) 50 mg capsule   No No   Sig: Take 1 capsule (50 mg total) by mouth 2 (two) times a day   ferrous sulfate 325 (65 Fe) mg tablet   No No   Sig: TAKE 1 TABLET BY MOUTH EVERY OTHER DAY   gabapentin (Neurontin) 100 mg capsule   No No   Sig: Take 1 capsule (100 mg total) by mouth 2 (two) times a day   insulin glargine (Lantus SoloStar) 100 units/mL injection pen   No No   Sig: Inject 25 Units under the skin every 12 (twelve) hours   insulin lispro (HumaLOG) 100 units/mL injection pen   No No   Sig: Inject 10 Units under the skin 3 (three) times a day with meals   isosorbide mononitrate (IMDUR) 30 mg 24 hr tablet   No No   Sig: Take 1 tablet (30 mg total) by mouth every evening   isosorbide mononitrate (IMDUR) 60 mg 24 hr tablet   No No   Sig: TAKE 1 TABLET BY MOUTH EVERY DAY   levothyroxine 50 mcg tablet  Self No No   Sig: TAKE 1 TABLET BY MOUTH EVERY DAY   losartan (COZAAR) 25 mg tablet   No No   Sig: TAKE 1 TABLET (25 MG TOTAL) BY MOUTH DAILY  naloxone (NARCAN) 4 mg/0 1 mL nasal spray  Self No No   Sig: Administer 1 spray into a nostril   If breathing does not return to normal or if breathing difficulty resumes after 2-3 minutes, give another dose in the other nostril using a new spray     nitroglycerin (NITROSTAT) 0 4 mg SL tablet  Self No No   Sig: Place 1 tablet (0 4 mg total) under the tongue every 5 (five) minutes as needed for chest pain   nystatin (MYCOSTATIN) powder  Self No No   Sig: Apply topically 2 (two) times a day   ondansetron (ZOFRAN-ODT) 4 mg disintegrating tablet   No No   Sig: Take 1 tablet (4 mg total) by mouth every 8 (eight) hours as needed for nausea or vomiting   oxyCODONE (ROXICODONE) 5 immediate release tablet   No No   Sig: Take 1 tablet (5 mg total) by mouth every 8 (eight) hours as needed for moderate pain Max Daily Amount: 15 mg   pantoprazole (PROTONIX) 40 mg tablet  Self No No   Sig: TAKE 1 TABLET BY MOUTH TWICE A DAY   tiZANidine (ZANAFLEX) 4 mg tablet   No No   Sig: TAKE 1 TABLET (4 MG TOTAL) BY MOUTH EVERY 8 (EIGHT) HOURS AS NEEDED FOR MUSCLE SPASMS   torsemide 40 MG TABS   No No   Sig: Take 80 mg by mouth daily      Facility-Administered Medications: None       Past Medical History:   Diagnosis Date    Abnormal liver function     Anemia     Anxiety     Arthritis     Chronic kidney disease     stage 3    Chronic narcotic dependence (HCC)     Chronic pain disorder     lower back, hands , neck and knees    Coronary artery disease     Depression     Diabetes mellitus (HCC)     Elevated troponin 2/11/2022    GERD (gastroesophageal reflux disease)     no meds at present    Heart murmur     murmur    Hyperlipidemia     Hypertension     Neurogenic bladder     Open toe wound 12/2020    right big toe open calus but is dry at present    Renal disorder     Shortness of breath     exertion    Sleep apnea     doesn't use cpap    Suprapubic catheter [facility])        Past Surgical History:   Procedure Laterality Date    AMPUTATION      ANGIOPLASTY  2017    5    BREAST EXCISIONAL BIOPSY Left     BREAST SURGERY  CARDIAC CATHETERIZATION      CARDIAC CATHETERIZATION N/A 7/1/2022    Procedure: Cardiac catheterization;  Surgeon: Parmjit Kennedy MD;  Location: [facility]; Service: Cardiology    CARDIAC CATHETERIZATION N/A 7/1/2022    Procedure: Cardiac pci;  Surgeon: Parmjit Kennedy MD;  Location: [facility]; Service: Cardiology    CARPAL TUNNEL RELEASE Bilateral     CERVICAL FUSION      HYSTERECTOMY  2008    IR SUPRAPUBIC CATHETER PLACEMENT  6/15/2021    IR TUNNELED DIALYSIS CATHETER PLACEMENT  7/15/2022    KNEE SURGERY      OOPHORECTOMY  2008    VT EXC SKIN BENIG 3 1-4 CM REMAINDR BODY N/A 12/21/2020    Procedure: EXCISION SEBACEOUS CYST X 5 SCALP;  Surgeon: Whit Sol MD;  Location:  MAIN OR;  Service: General    TOE AMPUTATION Left     TRIGGER FINGER RELEASE Right     4th Finger       Family History   Problem Relation Age of Onset    Stroke Father     Heart disease Father     No Known Problems Mother     No Known Problems Sister     No Known Problems Daughter     No Known Problems Maternal Grandmother     No Known Problems Maternal Grandfather     No Known Problems Paternal Grandmother     No Known Problems Paternal Grandfather     No Known Problems Maternal Aunt     No Known Problems Maternal Aunt     No Known Problems Maternal Aunt     No Known Problems Maternal Aunt     No Known Problems Maternal Aunt     No Known Problems Maternal Aunt     No Known Problems Paternal Aunt      I have reviewed and agree with the history as documented      E-Cigarette/Vaping    E-Cigarette Use Never User      E-Cigarette/Vaping Substances    Nicotine No     THC No     CBD No     Flavoring No     Other No     Unknown No      Social History     Tobacco Use    Smoking status: Former Smoker     Packs/day: 1 00     Years: 35 00     Pack years: 35 00     Types: Cigarettes     Quit date: 2012     Years since quitting: 10 6    Smokeless tobacco: Never Used   Vaping Use    Vaping Use: Never used   Substance Use Topics    Alcohol use: Not Currently    Drug use: Never       Review of Systems   Constitutional: Positive for fatigue  Negative for chills and fever  HENT: Negative for ear pain and sore throat  Eyes: Negative for pain and visual disturbance  Respiratory: Negative for cough and shortness of breath  Cardiovascular: Negative for chest pain and palpitations  Gastrointestinal: Positive for abdominal pain and nausea  Negative for vomiting  Genitourinary: Negative for dysuria and hematuria  Musculoskeletal: Negative for arthralgias and back pain  Skin: Negative for color change and rash  Neurological: Negative for seizures and syncope  All other systems reviewed and are negative  Physical Exam  Physical Exam  Vitals and nursing note reviewed  Constitutional:       General: She is not in acute distress  Appearance: She is well-developed  HENT:      Head: Normocephalic and atraumatic  Eyes:      Conjunctiva/sclera: Conjunctivae normal    Cardiovascular:      Rate and Rhythm: Normal rate and regular rhythm  Heart sounds: No murmur heard  Pulmonary:      Effort: Pulmonary effort is normal  No respiratory distress  Breath sounds: Normal breath sounds  Abdominal:      Palpations: Abdomen is soft  Tenderness: There is no abdominal tenderness  Comments: Suprapubic catheter noted in place draining somewhat cloudy urine   Musculoskeletal:      Cervical back: Neck supple  Skin:     General: Skin is warm and dry  Capillary Refill: Capillary refill takes less than 2 seconds  Neurological:      General: No focal deficit present  Mental Status: She is alert and oriented to person, place, and time           Vital Signs  ED Triage Vitals [08/25/22 1534]   Temperature Pulse Respirations Blood Pressure SpO2   97 7 °F (36 5 °C) 88 18 (!) 185/83 96 %      Temp Source Heart Rate Source Patient Position - Orthostatic VS BP Location FiO2 (%)   Oral Monitor Lying Left arm --      Pain Score       --           Vitals:    08/25/22 1534 08/25/22 1745   BP: (!) 185/83 (!) 189/81   Pulse: 88 70   Patient Position - Orthostatic VS: Lying Sitting         Visual Acuity      ED Medications  Medications   ondansetron (ZOFRAN) injection 4 mg (4 mg Intravenous Given 8/25/22 1721)   ondansetron (ZOFRAN) injection 4 mg (4 mg Intravenous Given 8/25/22 1802)       Diagnostic Studies  Results Reviewed     Procedure Component Value Units Date/Time    FLU/RSV/COVID - if FLU/RSV clinically relevant [[id number]]  (Normal) Collected: 08/25/22 1713    Lab Status: Final result Specimen: Nares from Nose Updated: 08/25/22 1757     SARS-CoV-2 Negative     INFLUENZA A PCR Negative     INFLUENZA B PCR Negative     RSV PCR Negative    Narrative:      FOR PEDIATRIC PATIENTS - copy/paste COVID Guidelines URL to browser: [email or web address]  [employer]    SARS-CoV-2 assay is a Nucleic Acid Amplification assay intended for the  qualitative detection of nucleic acid from SARS-CoV-2 in nasopharyngeal  swabs  Results are for the presumptive identification of SARS-CoV-2 RNA  Positive results are indicative of infection with SARS-CoV-2, the virus  causing COVID-19, but do not rule out bacterial infection or co-infection  with other viruses  Laboratories within the [place] and its  territories are required to report all positive results to the appropriate  public health authorities  Negative results do not preclude SARS-CoV-2  infection and should not be used as the sole basis for treatment or other  patient management decisions  Negative results must be combined with  clinical observations, patient history, and epidemiological information  This test has not been FDA cleared or approved  This test has been authorized by FDA under an Emergency Use Authorization  (EUA)   This test is only authorized for the duration of time the  declaration that circumstances exist justifying the authorization of the  emergency use of an in vitro diagnostic tests for detection of SARS-CoV-2  virus and/or diagnosis of COVID-19 infection under section 564(b)(1) of  the Act, 21 U  S C  197FSB-3(W)(4), unless the authorization is terminated  or revoked sooner  The test has been validated but independent review by FDA  and CLIA is pending  Test performed using [employer] GeneXpert: This RT-PCR assay targets N2,  a region unique to SARS-CoV-2  A conserved region in the E-gene was chosen  for pan-Sarbecovirus detection which includes SARS-CoV-2      HS Troponin I 2hr [[id number]]     Lab Status: No result Specimen: Blood     HS Troponin 0hr (reflex protocol) [[id number]]  (Abnormal) Collected: 08/25/22 1713    Lab Status: Final result Specimen: Blood from Arm, Right Updated: 08/25/22 1742     hs TnI 0hr 98 ng/L     Basic metabolic panel [[id number]]  (Abnormal) Collected: 08/25/22 1713    Lab Status: Final result Specimen: Blood from Arm, Right Updated: 08/25/22 1738     Sodium 136 mmol/L      Potassium 4 3 mmol/L      Chloride 98 mmol/L      CO2 27 mmol/L      ANION GAP 11 mmol/L      BUN 30 mg/dL      Creatinine 2 36 mg/dL      Glucose 403 mg/dL      Calcium 9 0 mg/dL      eGFR 21 ml/min/1 73sq m     Narrative:      [place] guidelines for Chronic Kidney Disease (CKD):     Stage 1 with normal or high GFR (GFR > 90 mL/min/1 73 square meters)    Stage 2 Mild CKD (GFR = 60-89 mL/min/1 73 square meters)    Stage 3A Moderate CKD (GFR = 45-59 mL/min/1 73 square meters)    Stage 3B Moderate CKD (GFR = 30-44 mL/min/1 73 square meters)    Stage 4 Severe CKD (GFR = 15-29 mL/min/1 73 square meters)    Stage 5 End Stage CKD (GFR <15 mL/min/1 73 square meters)  Note: GFR calculation is accurate only with a steady state creatinine    Hepatic function panel [[id number]]  (Abnormal) Collected: 08/25/22 1713    Lab Status: Final result Specimen: Blood from Arm, Right Updated: 08/25/22 1738     Total Bilirubin 0 48 mg/dL      Bilirubin, Direct 0 09 mg/dL      Alkaline Phosphatase 112 U/L      AST 18 U/L      ALT 22 U/L      Total Protein 7 0 g/dL      Albumin 3 2 g/dL     Lipase [[id number]]  (Abnormal) Collected: 08/25/22 1713    Lab Status: Final result Specimen: Blood from Arm, Right Updated: 08/25/22 1738     Lipase 45 u/L     CBC and differential [[id number]]  (Abnormal) Collected: 08/25/22 1713    Lab Status: Final result Specimen: Blood from Arm, Right Updated: 08/25/22 1720     WBC 8 91 Thousand/uL      RBC 3 42 Million/uL      Hemoglobin 11 3 g/dL      Hematocrit 32 5 %      MCV 95 fL      MCH 33 0 pg      MCHC 34 8 g/dL      RDW 14 6 %      MPV 8 8 fL      Platelets 455 Thousands/uL      nRBC 0 /100 WBCs      Neutrophils Relative 79 %      Immat GRANS % 0 %      Lymphocytes Relative 15 %      Monocytes Relative 6 %      Eosinophils Relative 0 %      Basophils Relative 0 %      Neutrophils Absolute 6 93 Thousands/µL      Immature Grans Absolute 0 03 Thousand/uL      Lymphocytes Absolute 1 36 Thousands/µL      Monocytes Absolute 0 52 Thousand/µL      Eosinophils Absolute 0 03 Thousand/µL      Basophils Absolute 0 04 Thousands/µL                  CT abdomen pelvis wo contrast   Final Result by Gwenette Gosselin, MD (08/25 1649)         1  No acute inflammatory process in the abdomen or the pelvis  2   Large fat-containing umbilical hernia  Workstation performed: [id number]         XR chest 1 view portable    (Results Pending)              Procedures  Procedures         ED Course  ED Course as of 08/25/22 1828   Thu Aug 25, 2022   1827 EKG reviewed by me, normal sinus rhythm with left bundle-branch block (present on previous EKG)  She has left axis deviation  Subtle nonspecific ST changes are noted in the inferior leads which may be somewhat different than her most recent EKG from July of 2022                HEART Risk Score    Flowsheet Row Most Recent Value Heart Score Risk Calculator    History 1 Filed at: 08/25/2022 1804   ECG 1 Filed at: 08/25/2022 1804   Age 1 Filed at: 08/25/2022 1804   Risk Factors 2 Filed at: 08/25/2022 1804   Troponin 2 Filed at: 08/25/2022 1804   HEART Score 7 Filed at: 08/25/2022 1804                                      MDM  Number of Diagnoses or Management Options  Diabetes [facility]): new and requires workup  Elevated troponin: new and requires workup  End stage renal disease on dialysis [facility]): new and requires workup  Multiple vessel coronary artery disease: new and requires workup  Nausea: new and requires workup  Diagnosis management comments: 29-year-old female with multiple medical problems presented for upper abdominal lower chest pressure/discomfort as well as severe nausea starting this morning  She is a very high-risk patient from a cardiac standpoint with history of multi-vessel disease and multiple interventions  She has known 95% stenosis of her RCA (though on most recent catheterization this was found to be non dominant)  Her initial troponin today was 98  While it was elevated, she does appear to have some chronically elevated troponins, perhaps due to her kidney disease  Still most times that her troponin has been trended has continued to rise and given her very high risk status we will plan to admit for further evaluation and treatment per Internal Medicine and Cardiology         Amount and/or Complexity of Data Reviewed  Clinical lab tests: reviewed and ordered  Tests in the radiology section of CPT®: reviewed and ordered  Review and summarize past medical records: yes  Discuss the patient with other providers: yes  Independent visualization of images, tracings, or specimens: yes        Disposition  Final diagnoses:   Nausea   Elevated troponin   Multiple vessel coronary artery disease   End stage renal disease on dialysis ([facility] [address] )   Diabetes ([facility] )     Time reflects when diagnosis was documented in both MDM as applicable and the Disposition within this note     Time User Action Codes Description Comment    8/25/2022  6:11 PM Myrla Chely Add [R11 0] Nausea     8/25/2022  6:12 PM Myrla Chely Add [R77 8] Elevated troponin     8/25/2022  6:12 PM Myrla Chely Add [I25 10] Multiple vessel coronary artery disease     8/25/2022  6:12 PM Myrla Chely Add [N18 6,  Z99 2] End stage renal disease on dialysis ([address] )     8/25/2022  6:13 PM Myrla Chely Add [E11 9] Diabetes [facility])       ED Disposition     ED Disposition   Admit    Condition   Stable    Date/Time   Thu Aug 25, 2022  6:11 PM    Comment   Case was discussed with MORELIA and the patient's admission status was agreed to be Admission Status: observation status to the service of Dr Jaret Wright  Follow-up Information    None         Patient's Medications   Discharge Prescriptions    No medications on file       No discharge procedures on file      PDMP Review       Value Time User    PDMP Reviewed  Yes 8/1/2022  2:22 PM Purvi Gregorio          ED Provider  Electronically Signed by           Karthik Mendes MD  08/25/22 7114

## 2022-08-26 ENCOUNTER — APPOINTMENT (INPATIENT)
Dept: DIALYSIS | Facility: HOSPITAL | Age: 62
End: 2022-08-26
Payer: COMMERCIAL

## 2022-08-26 ENCOUNTER — TELEPHONE (OUTPATIENT)
Dept: FAMILY MEDICINE CLINIC | Facility: CLINIC | Age: 62
End: 2022-08-26

## 2022-08-26 VITALS
SYSTOLIC BLOOD PRESSURE: 140 MMHG | RESPIRATION RATE: 18 BRPM | DIASTOLIC BLOOD PRESSURE: 66 MMHG | WEIGHT: 260.36 LBS | BODY MASS INDEX: 39.59 KG/M2 | TEMPERATURE: 98 F | OXYGEN SATURATION: 99 % | HEART RATE: 58 BPM

## 2022-08-26 LAB
ALBUMIN SERPL BCP-MCNC: 2.7 G/DL (ref 3.5–5)
ALP SERPL-CCNC: 94 U/L (ref 46–116)
ALT SERPL W P-5'-P-CCNC: 18 U/L (ref 12–78)
ANION GAP SERPL CALCULATED.3IONS-SCNC: 6 MMOL/L (ref 4–13)
AST SERPL W P-5'-P-CCNC: 13 U/L (ref 5–45)
BASOPHILS # BLD AUTO: 0.03 THOUSANDS/ΜL (ref 0–0.1)
BASOPHILS NFR BLD AUTO: 0 % (ref 0–1)
BILIRUB SERPL-MCNC: 0.24 MG/DL (ref 0.2–1)
BUN SERPL-MCNC: 36 MG/DL (ref 5–25)
CALCIUM ALBUM COR SERPL-MCNC: 9.4 MG/DL (ref 8.3–10.1)
CALCIUM SERPL-MCNC: 8.4 MG/DL (ref 8.3–10.1)
CHLORIDE SERPL-SCNC: 101 MMOL/L (ref 96–108)
CO2 SERPL-SCNC: 31 MMOL/L (ref 21–32)
CREAT SERPL-MCNC: 2.61 MG/DL (ref 0.6–1.3)
EOSINOPHIL # BLD AUTO: 0.09 THOUSAND/ΜL (ref 0–0.61)
EOSINOPHIL NFR BLD AUTO: 1 % (ref 0–6)
ERYTHROCYTE [DISTWIDTH] IN BLOOD BY AUTOMATED COUNT: 15.2 % (ref 11.6–15.1)
EST. AVERAGE GLUCOSE BLD GHB EST-MCNC: 186 MG/DL
GFR SERPL CREATININE-BSD FRML MDRD: 19 ML/MIN/1.73SQ M
GLUCOSE SERPL-MCNC: 240 MG/DL (ref 65–140)
GLUCOSE SERPL-MCNC: 358 MG/DL (ref 65–140)
GLUCOSE SERPL-MCNC: 374 MG/DL (ref 65–140)
GLUCOSE SERPL-MCNC: 381 MG/DL (ref 65–140)
HBA1C MFR BLD: 8.1 %
HCT VFR BLD AUTO: 29.4 % (ref 34.8–46.1)
HGB BLD-MCNC: 9.5 G/DL (ref 11.5–15.4)
IMM GRANULOCYTES # BLD AUTO: 0.03 THOUSAND/UL (ref 0–0.2)
IMM GRANULOCYTES NFR BLD AUTO: 0 % (ref 0–2)
LYMPHOCYTES # BLD AUTO: 1.97 THOUSANDS/ΜL (ref 0.6–4.47)
LYMPHOCYTES NFR BLD AUTO: 26 % (ref 14–44)
MCH RBC QN AUTO: 32.5 PG (ref 26.8–34.3)
MCHC RBC AUTO-ENTMCNC: 32.3 G/DL (ref 31.4–37.4)
MCV RBC AUTO: 101 FL (ref 82–98)
MONOCYTES # BLD AUTO: 0.57 THOUSAND/ΜL (ref 0.17–1.22)
MONOCYTES NFR BLD AUTO: 8 % (ref 4–12)
NEUTROPHILS # BLD AUTO: 4.91 THOUSANDS/ΜL (ref 1.85–7.62)
NEUTS SEG NFR BLD AUTO: 65 % (ref 43–75)
NRBC BLD AUTO-RTO: 0 /100 WBCS
PLATELET # BLD AUTO: 191 THOUSANDS/UL (ref 149–390)
PMV BLD AUTO: 9.8 FL (ref 8.9–12.7)
POTASSIUM SERPL-SCNC: 3.6 MMOL/L (ref 3.5–5.3)
PROT SERPL-MCNC: 5.9 G/DL (ref 6.4–8.4)
RBC # BLD AUTO: 2.92 MILLION/UL (ref 3.81–5.12)
SODIUM SERPL-SCNC: 138 MMOL/L (ref 135–147)
WBC # BLD AUTO: 7.6 THOUSAND/UL (ref 4.31–10.16)

## 2022-08-26 PROCEDURE — 99222 1ST HOSP IP/OBS MODERATE 55: CPT | Performed by: NURSE PRACTITIONER

## 2022-08-26 PROCEDURE — 90935 HEMODIALYSIS ONE EVALUATION: CPT | Performed by: INTERNAL MEDICINE

## 2022-08-26 PROCEDURE — 82948 REAGENT STRIP/BLOOD GLUCOSE: CPT

## 2022-08-26 PROCEDURE — 85025 COMPLETE CBC W/AUTO DIFF WBC: CPT | Performed by: INTERNAL MEDICINE

## 2022-08-26 PROCEDURE — 5A1D70Z PERFORMANCE OF URINARY FILTRATION, INTERMITTENT, LESS THAN 6 HOURS PER DAY: ICD-10-PCS | Performed by: INTERNAL MEDICINE

## 2022-08-26 PROCEDURE — 80053 COMPREHEN METABOLIC PANEL: CPT | Performed by: INTERNAL MEDICINE

## 2022-08-26 PROCEDURE — 83036 HEMOGLOBIN GLYCOSYLATED A1C: CPT | Performed by: PHYSICIAN ASSISTANT

## 2022-08-26 PROCEDURE — RECHECK: Performed by: PHYSICIAN ASSISTANT

## 2022-08-26 PROCEDURE — 99239 HOSP IP/OBS DSCHRG MGMT >30: CPT | Performed by: PHYSICIAN ASSISTANT

## 2022-08-26 PROCEDURE — 3052F HG A1C>EQUAL 8.0%<EQUAL 9.0%: CPT

## 2022-08-26 PROCEDURE — 99222 1ST HOSP IP/OBS MODERATE 55: CPT | Performed by: INTERNAL MEDICINE

## 2022-08-26 RX ORDER — HYDROXYZINE HYDROCHLORIDE 25 MG/1
25 TABLET, FILM COATED ORAL ONCE
Status: COMPLETED | OUTPATIENT
Start: 2022-08-26 | End: 2022-08-26

## 2022-08-26 RX ORDER — INSULIN GLARGINE 100 [IU]/ML
28 INJECTION, SOLUTION SUBCUTANEOUS EVERY 12 HOURS SCHEDULED
Status: DISCONTINUED | OUTPATIENT
Start: 2022-08-26 | End: 2022-08-26 | Stop reason: HOSPADM

## 2022-08-26 RX ADMIN — GABAPENTIN 100 MG: 100 CAPSULE ORAL at 08:23

## 2022-08-26 RX ADMIN — BISOPROLOL FUMARATE 2.5 MG: 5 TABLET ORAL at 08:22

## 2022-08-26 RX ADMIN — INSULIN LISPRO 3 UNITS: 100 INJECTION, SOLUTION INTRAVENOUS; SUBCUTANEOUS at 08:27

## 2022-08-26 RX ADMIN — LOSARTAN POTASSIUM 25 MG: 25 TABLET, FILM COATED ORAL at 08:23

## 2022-08-26 RX ADMIN — INSULIN GLARGINE 20 UNITS: 100 INJECTION, SOLUTION SUBCUTANEOUS at 08:27

## 2022-08-26 RX ADMIN — HEPARIN SODIUM 5000 UNITS: 5000 INJECTION INTRAVENOUS; SUBCUTANEOUS at 05:09

## 2022-08-26 RX ADMIN — IRON SUCROSE 50 MG: 20 INJECTION, SOLUTION INTRAVENOUS at 10:59

## 2022-08-26 RX ADMIN — HYDROXYZINE HYDROCHLORIDE 25 MG: 25 TABLET, FILM COATED ORAL at 01:36

## 2022-08-26 RX ADMIN — SODIUM CHLORIDE 100 ML/HR: 0.9 INJECTION, SOLUTION INTRAVENOUS at 08:29

## 2022-08-26 RX ADMIN — HEPARIN SODIUM 5000 UNITS: 5000 INJECTION INTRAVENOUS; SUBCUTANEOUS at 15:01

## 2022-08-26 RX ADMIN — CLOPIDOGREL BISULFATE 75 MG: 75 TABLET ORAL at 08:22

## 2022-08-26 RX ADMIN — CITALOPRAM HYDROBROMIDE 40 MG: 20 TABLET ORAL at 08:22

## 2022-08-26 RX ADMIN — LEVOTHYROXINE SODIUM 50 MCG: 50 TABLET ORAL at 05:09

## 2022-08-26 RX ADMIN — TORSEMIDE 40 MG: 20 TABLET ORAL at 08:22

## 2022-08-26 RX ADMIN — ALLOPURINOL 300 MG: 100 TABLET ORAL at 08:22

## 2022-08-26 RX ADMIN — ASPIRIN 81 MG: 81 TABLET, COATED ORAL at 08:23

## 2022-08-26 RX ADMIN — PANTOPRAZOLE SODIUM 40 MG: 40 TABLET, DELAYED RELEASE ORAL at 08:22

## 2022-08-26 RX ADMIN — OXYCODONE 5 MG: 5 TABLET ORAL at 01:36

## 2022-08-26 RX ADMIN — ISOSORBIDE MONONITRATE 60 MG: 60 TABLET, EXTENDED RELEASE ORAL at 08:23

## 2022-08-26 RX ADMIN — INSULIN LISPRO 2 UNITS: 100 INJECTION, SOLUTION INTRAVENOUS; SUBCUTANEOUS at 12:55

## 2022-08-26 NOTE — ASSESSMENT & PLAN NOTE
· Patient has history of multiple stents to LAD, stenting to RCA in August 2021  · Recent cardiac catheterization on 07/01/2022 revealed mid circumflex lesion 80%, proximal RCA lesion 95% diffuse moderate disease of LAD status post JANET of mid circumflex  · Maintained on aspirin, Plavix, isosorbide, statin

## 2022-08-26 NOTE — TELEPHONE ENCOUNTER
PCP SIGNATURE NEEDED FOR [facility] RECEIVED VIA FAX AND PLACED IN PCP FOLDER TO BE DELIVERED AT ASSIGNED TIMES        EPISODE DATES 08/11/2022-10/09/2022 [General Appearance - Alert] : alert [General Appearance - In No Acute Distress] : in no acute distress [Sclera] : the sclera and conjunctiva were normal [Outer Ear] : the ears and nose were normal in appearance [Neck Appearance] : the appearance of the neck was normal [] : no respiratory distress [Heart Rate And Rhythm] : heart rate was normal and rhythm regular [Heart Sounds] : normal S1 and S2 [Bowel Sounds] : normal bowel sounds [Abdomen Soft] : soft [No Palpable Adenopathy] : no palpable adenopathy [Musculoskeletal - Swelling] : no joint swelling [Motor Exam] : the motor exam was normal [No Focal Deficits] : no focal deficits [Oriented To Time, Place, And Person] : oriented to person, place, and time [FreeTextEntry1] : + rash in groin bilaterally and on labia majora

## 2022-08-26 NOTE — ASSESSMENT & PLAN NOTE
Lab Results   Component Value Date    EGFR 21 08/25/2022    EGFR 14 07/18/2022    EGFR 15 07/17/2022    CREATININE 2 36 (H) 08/25/2022    CREATININE 3 33 (H) 07/18/2022    CREATININE 3 20 (H) 07/17/2022   · On dialysis MWF  · Nephrology consultation for dialysis

## 2022-08-26 NOTE — CONSULTS
Consult - Cardiology   Rios Hollis 63 y o  female MRN: [id number]  Unit/Bed#: E4 -01 Encounter: [id number]        Reason For Consult:  Abnormal EKG            ASSESSMENT:  Elevated troponin   - high sensitivity troponin trend 90, 96, 92  Nausea  Chronic combined systolic and diastolic congestive heart failure  Ischemic cardiomyopathy              - LVEF 38%, mild left atrial cavity enlargement, AV sclerosis with trace AR, MV sclerosis with mild MR, mild TR, trace AK, varying degrees of trace to small circumferential pericardial effusion without echocardiographic evidence of cardiac tamponade, 6/29/22     - LVEF 30%, moderate LVH, mild LA dilatation, AV sclerosis, trace AR, mild MR, MV sclerosis, mild TR, June 2022  Coronary artery disease with multiple PCIs in the past              - NSTEMI s/p JANET-mLCx w/ small 95% pRCA and diffuse moderate disease of LAD, July 2022               - S/P -pRCA, August 2021               - S/P JANET-mLAD, JANET-D1 and JANET-LCx, December 2012  Nonsustained ventricular tachycardia, 8 beat run via telemetry  Hypertension  Dyslipidemia              - lipid panel 6/24/22: Cholesterol 120, triglycerides 138, HDL 42, LDL 50  ESRD on HD  Morbid obesity  Anemia of chronic kidney disease  Type 2 diabetes mellitus  Polycystic ovarian syndrome  Obstructive sleep apnea, uncorrected  Hx syncope with orthostatic hypotension  Hypothyroid    PLAN/ DISCUSSION:     · Elevated troponin with high sensitivity troponin trend as noted above  Troponin trend flat  Suspect non- MI troponin elevation in the setting of underlying cardiomyopathy, CAD, ESRD, malignant hypertension on arrival  Patient without chest pain, anginal symptoms  · Continue bisoprolol 2 5 mg daily, isosorbide mononitrate 60 mg in the morning + 30 mg in the evening, losartan 25 mg daily, torsemide 40 mg twice daily    · Continue aspirin 81 mg daily, Plavix 75 mg daily, statin therapy with atorvastatin 40 mg daily   · Appreciate Nephrology assistance with volume management via HD + diuretics as above  · Monitor volume status closely with strict intake/output, daily weight  · Recommend to maintain potassium > 4, magnesium > 2    · As noted below, patient will be moving in early September and will need to establish care with a Cardiologist in [place]  History Of Present Illness:  82-year-old female presented to [facility] with complaints of nausea  Per patient, she did not exhibit any vomiting or diarrhea with the nausea  Patient did endorse some dizziness with the nausea  Upon assessment and evaluation, patient resting in bed comfortably receiving dialysis  Patient without chest pain, shortness of breath, dizziness, lightheadedness, syncope, pre-syncope, palpitations  Please see significant cardiac history and problems enumerated above  Patient follows with outpatient cardiologist, Dr Erika Li with most recent visit noted on 8/18/22  Patient was seen for follow-up post recent hospitalization with acute on chronic systolic and diastolic heart failure  During hospitalization, patient was initiated on hemodialysis  During office visit, amlodipine was discontinued in bisoprolol was decreased to 2 5 mg daily  In addition, torsemide was split to 40 mg twice daily from 80 mg once daily  Per review, patient plans to move to [facility] in early September      Past Medical History:        Past Medical History:   Diagnosis Date    Abnormal liver function     Anemia     Anxiety     Arthritis     Chronic kidney disease     stage 3    Chronic narcotic dependence (HCC)     Chronic pain disorder     lower back, hands , neck and knees    Coronary artery disease     Depression     Diabetes mellitus ([facility] [address] )     Elevated troponin 2/11/2022    GERD (gastroesophageal reflux disease)     no meds at present    Heart murmur     murmur    Hyperlipidemia     Hypertension     Neurogenic bladder     Open toe wound 12/2020    right big toe open calus but is dry at present    Renal disorder     Shortness of breath     exertion    Sleep apnea     doesn't use cpap    Suprapubic catheter [facility])       Past Surgical History:   Procedure Laterality Date    AMPUTATION      ANGIOPLASTY  2017    5    BREAST EXCISIONAL BIOPSY Left     BREAST SURGERY      CARDIAC CATHETERIZATION      CARDIAC CATHETERIZATION N/A 7/1/2022    Procedure: Cardiac catheterization;  Surgeon: Margarita Grayson MD;  Location: [facility]; Service: Cardiology    CARDIAC CATHETERIZATION N/A 7/1/2022    Procedure: Cardiac pci;  Surgeon: Margarita Grayson MD;  [facility]: [facility]; Service: Cardiology    CARPAL TUNNEL RELEASE Bilateral     CERVICAL FUSION      HYSTERECTOMY  2008    IR SUPRAPUBIC CATHETER PLACEMENT  6/15/2021    IR TUNNELED DIALYSIS CATHETER PLACEMENT  7/15/2022    KNEE SURGERY      OOPHORECTOMY  2008    MT EXC SKIN BENIG 3 1-4 CM REMAINDR BODY N/A 12/21/2020    Procedure: EXCISION SEBACEOUS CYST X 5 SCALP;  Surgeon: Oanh Junior MD;  Location: [address] [place] [address];  Service: General    TOE AMPUTATION Left     TRIGGER FINGER RELEASE Right     4th Finger        Allergy:        Allergies   Allergen Reactions    Codeine      Other reaction(s): Nausea and Vomiting    Latex Itching       Medications:       Prior to Admission medications    Medication Sig Start Date End Date Taking?  Authorizing Provider   allopurinol (ZYLOPRIM) 300 mg tablet Take 1 tablet (300 mg total) by mouth daily 3/29/22   CHERELLE Richardson   aspirin (ECOTRIN LOW STRENGTH) 81 mg EC tablet Take 1 tablet (81 mg total) by mouth daily 3/29/22   CHERELLE Richardson   atorvastatin (LIPITOR) 40 mg tablet Take 1 tablet (40 mg total) by mouth daily 3/29/22   CHERELLE Richardson   bisoprolol (ZEBETA) 5 mg tablet Take 0 5 tablets (2 5 mg total) by mouth daily 7/2/22   Sai Hannon MD   citalopram (CeleXA) 40 mg tablet Take 1 tablet (40 mg total) by mouth daily 3/29/22   Cornelious CHERELLE Cueva   clopidogrel (PLAVIX) 75 mg tablet Take 1 tablet (75 mg total) by mouth daily 3/29/22   Cornelious CHERELLE Cueva   docusate sodium (COLACE) 50 mg capsule Take 1 capsule (50 mg total) by mouth 2 (two) times a day 8/1/22   Cornelious CHERLELE Cueva   Dulaglutide (Trulicity) 1 5 XP/6 5TY SOPN Inject 0 5 mL (1 5 mg total) under the skin once a week 3/29/22   Cornelious CHERELLE Cueva   ferrous sulfate 325 (65 Fe) mg tablet TAKE 1 TABLET BY MOUTH EVERY OTHER DAY 7/28/22   Cornelious CHERELLE Cueva   gabapentin (Neurontin) 100 mg capsule Take 1 capsule (100 mg total) by mouth 2 (two) times a day 8/2/22   Cornelious CHERELLE Cueva   insulin glargine (Lantus SoloStar) 100 units/mL injection pen Inject 25 Units under the skin every 12 (twelve) hours 7/19/22   Alli Pulling, DO   insulin lispro (HumaLOG) 100 units/mL injection pen Inject 10 Units under the skin 3 (three) times a day with meals 7/19/22   Alli Pulling, DO   Insulin Pen Needle (BD Pen Needle Micro U/F) 32G X 6 MM MISC Use 5 (five) times a day 3/29/22   Cornelious CHERELLE Cueva   Insulin Syringe-Needle U-100 (BD Veo Insulin Syringe U/F) 31G X 15/64" 0 5 ML MISC Inject under the skin 3 (three) times a day 3/29/22   Cornelious CHERELLE Cueva   isosorbide mononitrate (IMDUR) 30 mg 24 hr tablet Take 1 tablet (30 mg total) by mouth every evening 7/19/22   Alli Pulling, DO   isosorbide mononitrate (IMDUR) 60 mg 24 hr tablet TAKE 1 TABLET BY MOUTH EVERY DAY 3/1/22   Chet London,    Lancets (OneTouch Delica Plus [id number]) MISC USE TO TEST 3 TIMES DAILY 3/10/21   CHERELLE Fox   levothyroxine 50 mcg tablet TAKE 1 TABLET BY MOUTH EVERY DAY 2/4/22   CHERELLE Chun   losartan (COZAAR) 25 mg tablet TAKE 1 TABLET (25 MG TOTAL) BY MOUTH DAILY  8/17/22   CHERELLE Chun   naloxone [facility]) 4 mg/0 1 mL nasal spray Administer 1 spray into a nostril   If breathing does not return to normal or if breathing difficulty resumes after 2-3 minutes, give another dose in the other nostril using a new spray  3/10/21   CHERELLE Miller   nitroglycerin (NITROSTAT) 0 4 mg SL tablet Place 1 tablet (0 4 mg total) under the tongue every 5 (five) minutes as needed for chest pain 4/26/21   Cee Arevalo,    nystatin (MYCOSTATIN) powder Apply topically 2 (two) times a day 9/10/21   Tim Valverde PA-C   OLANZapine (ZyPREXA) 5 mg tablet Take 1 tablet (5 mg total) by mouth daily at bedtime 7/22/22   CHERELLE Bejarano   ondansetron (ZOFRAN-ODT) 4 mg disintegrating tablet Take 1 tablet (4 mg total) by mouth every 8 (eight) hours as needed for nausea or vomiting 6/19/22   Angela Gallagher,    OneTouch Ultra test strip USE 1 EACH DAILY USE AS INSTRUCTED 2/21/22   CHERELLE Krueger   oxyCODONE (ROXICODONE) 5 immediate release tablet Take 1 tablet (5 mg total) by mouth every 8 (eight) hours as needed for moderate pain Max Daily Amount: 15 mg 8/1/22   CHERELLE Krueger   pantoprazole (PROTONIX) 40 mg tablet TAKE 1 TABLET BY MOUTH TWICE A DAY 2/21/22   Henry Bañuelos MD   tiZANidine (ZANAFLEX) 4 mg tablet TAKE 1 TABLET (4 MG TOTAL) BY MOUTH EVERY 8 (EIGHT) HOURS AS NEEDED FOR MUSCLE SPASMS 5/27/22   CHERELLE Krueger   torsemide 40 MG TABS Take 80 mg by mouth daily 7/20/22   Althea More DO   oxygen gas Inhale 2 L/min continuous   Indications: Respiratory Failure 1/1/20 6/11/22  Historical Provider, MD       Family History:     Family History   Problem Relation Age of Onset    Stroke Father     Heart disease Father     No Known Problems Mother     No Known Problems Sister     No Known Problems Daughter     No Known Problems Maternal Grandmother     No Known Problems Maternal Grandfather     No Known Problems Paternal Grandmother     No Known Problems Paternal Grandfather     No Known Problems Maternal Aunt     No Known Problems Maternal Aunt     No Known Problems Maternal Aunt     No Known Problems Maternal Aunt     No Known Problems Maternal Aunt     No Known Problems Maternal Aunt     No Known Problems Paternal Aunt         Social History:       Social History     Socioeconomic History    Marital status:      Spouse name: None    Number of children: None    Years of education: None    Highest education level: None   Occupational History    None   Tobacco Use    Smoking status: Former Smoker     Packs/day: 1 00     Years: 35 00     Pack years: 35 00     Types: Cigarettes     Quit date: 2012     Years since quitting: 10 6    Smokeless tobacco: Never Used   Vaping Use    Vaping Use: Never used   Substance and Sexual Activity    Alcohol use: Not Currently    Drug use: Never    Sexual activity: Not Currently   Other Topics Concern    None   Social History Narrative    None     Social Determinants of Health     Financial Resource Strain: Low Risk     Difficulty of Paying Living Expenses: Not hard at all   Food Insecurity: No Food Insecurity    Worried About Running Out of Food in the Last Year: Never true    Henry of Food in the Last Year: Never true   Transportation Needs: No Transportation Needs    Lack of Transportation (Medical): No    Lack of Transportation (Non-Medical): No   Physical Activity: Not on file   Stress: Not on file   Social Connections: Not on file   Intimate Partner Violence: Not on file   Housing Stability: Low Risk     Unable to Pay for Housing in the Last Year: No    Number of Places Lived in the Last Year: 1    Unstable Housing in the Last Year: No       ROS:  Negative except otherwise aforementioned above  Remainder review of systems is negative    Exam:  General:  alert, oriented and in no distress, cooperative  Head: Normocephalic, atraumatic  Eyes:  EOMI  Pupils - equal, round, reactive to accomodation  No icterus  Normal Conjunctiva     Oropharynx: moist and normal-appearing mucosa  Neck: supple, symmetrical, trachea midline   Heart: bradycardia, regular rhythm, S1, S2 Respiratory effort / Chest Inspection: unlabored  Lungs: diminished lung sounds bilateral bases   Abdomen: obese, rounded, normoactive bowel sounds  Lower Limbs:  no pitting edema      DATA:      ECG:                       Telemetry: sinus bradycardia on exam           Echocardiogram completed on 6/29/22:         1  Normal left ventricular size, moderate concentric left ventricular hypertrophy, moderately reduced left ventricular systolic function with marked regional wall motion abnormalities as described below  Ejection fraction is estimated as around 38%  2  Normal right ventricular size and systolic function  3  Mild left atrial cavity enlargement  4  Aortic valve sclerosis, trace aortic valve regurgitation  5  Mitral valve sclerosis with some focal calcification, mild mitral valve regurgitation  6  Mild tricuspid and trace pulmonic valve regurgitation  7  No obvious pulmonary hypertension  8  Varying degrees of trace to small circumferential pericardial effusion without echocardiographic evidence of cardiac tamponade        Compared to report of previous echocardiogram from June 22, 2022 left ventricular ejection fraction is slightly better however wall motion abnormalities are persisting  pericardial effusion is new  Cardiac catheterization completed on 7/1/22:  · Mid Cx lesion is 80% stenosed  · Prox RCA lesion is 95% stenosed  CAD, with 80% stenosis, mid dominant circumflex  Lesion was just proximal to bifurcation of large OM1 branch  Circ lesion was flow-signifcant (iFR=0 76)  The large OM branch had been stented in the past, and contained a non-critical proximal stenosis  The small, non-dominant RCA was severely diseased  Successful IVUS-guided PCI of mid circumflex under GuideLiner support, with reduction in stenosis from 80% to 0% following placement of a 3 5x33mm JANET  The jailed OM1 branch remained widely patent  Plan: DAPT, statin, lifestyle intervention       Weights:     Wt Readings from Last 3 Encounters:   08/25/22 118 kg (260 lb 5 8 oz)   08/18/22 117 kg (258 lb 9 6 oz)   08/01/22 120 kg (265 lb 6 4 oz)   , Body mass index is 39 59 kg/m²           Lab Studies:             Results from last 7 days   Lab Units 08/26/22  0453 08/25/22  2214 08/25/22  1713   WBC Thousand/uL 7 60  --  8 91   HEMOGLOBIN g/dL 9 5*  --  11 3*   HEMATOCRIT % 29 4*  --  32 5*   PLATELETS Thousands/uL 191 200 217   ,   Results from last 7 days   Lab Units 08/26/22  0453 08/25/22  1713   POTASSIUM mmol/L 3 6 4 3   CHLORIDE mmol/L 101 98   CO2 mmol/L 31 27   BUN mg/dL 36* 30*   CREATININE mg/dL 2 61* 2 36*   CALCIUM mg/dL 8 4 9 0   ALK PHOS U/L 94 112   ALT U/L 18 22   AST U/L 13 18

## 2022-08-26 NOTE — HEMODIALYSIS
Post-Dialysis RN Treatment Note    Blood Pressure:  Pre 156/81 mm/Hg  Post 129/64 mmHg   EDW  118 kg    Weight:  Pre 116 5 kg   Post 115 1 kg   Mode of weight measurement: Standing Scale   Volume Removed  1000 ml    Treatment duration 180 minutes    NS given  No    Treatment shortened? No   Medications given during Rx Venofer 50 mg  Epogen delayed from pharmacy, primary nurse will administer once received  Estimated Kt/V  Not Applicable   Access type: Permacath/TDC   Access Issues: Yes, describe: High art pressure alarms, lines reversed for improved flow and able to maintain bfr 350  Report called to primary nurse   Yes  Bon Bauer RN    HD tx plan: 3 hrs removing 1L as korin, reviewed with Yg Hoffman at start of hd  4K for K 3 6 8/26  Using RIJ permacatjesus for hd

## 2022-08-26 NOTE — DISCHARGE SUMMARY
[address] [facility]  Discharge- Ines Bills 1960, 63 y o  female MRN: [id number]  Unit/Bed#: E4 -01 Encounter: [id number]  Primary Care Provider: CHERELLE Ontiveros   Date and time admitted to hospital: 8/25/2022  3:28 PM    * Nausea  Assessment & Plan  44-year-old female with history of ESRD on dialysis, depression, diabetes mellitus, hypertension, hyperlipidemia, chronic systolic and diastolic CHF presenting with nausea for the past day  Patient denies any vomiting, chest pain, palpitations, dyspnea  Denies any fever or chills  Denies any recent travel or sick contact    · Differentials include gastroenteritis versus GERD  · Seen by cards without concerns for ACS   · CT A/P without acute abnormality   · Continue supportive care   · Tolerated diet and feels well to go home       Chronic combined systolic and diastolic congestive heart failure (HCC)  Assessment & Plan  Wt Readings from Last 3 Encounters:   08/25/22 118 kg (260 lb 5 8 oz)   08/18/22 117 kg (258 lb 9 6 oz)   08/01/22 120 kg (265 lb 6 4 oz)     · Echocardiogram in 06/29/2022 revealed Ejection fraction 38%  · Continue torsemide 40 mg b i d   · volume control with dialysis    Continuous opioid dependence ([facility] [address] )  Assessment & Plan  · Patient is on oxycodone 5 mg q 8 hours p r n    · PDMP reviewed    Mixed hyperlipidemia  Assessment & Plan  · Continue statin    Acquired hypothyroidism  Assessment & Plan  · Continue Levothyroxine    Gastroesophageal reflux disease without esophagitis  Assessment & Plan  · Continue PPI    Neurogenic bladder  Assessment & Plan  · With chronic indwelling suprapubic catheter    ESRD (end stage renal disease) [facility])  Assessment & Plan  Lab Results   Component Value Date    EGFR 19 08/26/2022    EGFR 21 08/25/2022    EGFR 14 07/18/2022    CREATININE 2 61 (H) 08/26/2022    CREATININE 2 36 (H) 08/25/2022    CREATININE 3 33 (H) 07/18/2022   · On dialysis MWF  · Nephrology consultation for dialysis  · Receiving dialysis this morning per regular schedule     HTN (hypertension)  Assessment & Plan  · Continue losartan 25 mg daily, bisoprolol 2 5 mg daily, isosorbide 60 mg in the morning and 30 mg HS, torsemide 40 mg bid  · BP stable post HD treatment       CAD (coronary artery disease)  Assessment & Plan  · Patient has history of multiple stents to LAD, stenting to RCA in August 2021  · July 2022 cardiac cath: mid cx 80% stenoses, prox RCA 95 % stenosed s/p PCI with JANET mid Cx   · Recent cardiac catheterization on 07/01/2022 revealed mid circumflex lesion 80%, proximal RCA lesion 95% diffuse moderate disease of LAD status post JANET of mid circumflex  · Maintained on aspirin, Plavix, isosorbide, statin, bisoprolol   · No changes to meds or inpatient cardiac workup needed   · No chest pain   · Cleared by cardiology for discharge     Type 2 diabetes mellitus with stage 5 chronic kidney disease not on chronic dialysis, with long-term current use of insulin ([facility] [address] )  Assessment & Plan  Lab Results   Component Value Date    HGBA1C 7 0 (A) 08/01/2022       Blood Sugar Average: Last 72 hrs:  · (P) 340 [id number]   · at home patient is maintained on Lantus 25 units b i d    · Diabetic diet     Major depressive disorder  Assessment & Plan  · Continue citalopram 40 mg daily, zyprexa 5 mg HS      Medical Problems             Resolved Problems  Date Reviewed: 8/26/2022   None               Discharging Physician / Practitioner: Vini Watt PA-C  PCP: Purvi Villanueva [facility]  Admission Date:   Admission Orders (From admission, onward)     Ordered        08/25/22 1814  INPATIENT ADMISSION  Once                      Discharge Date: 08/26/22    Consultations During Hospital Stay:  · Nephrology   · Cardiology     Procedures Performed:   · none    Significant Findings / Test Results:   · CT A/P:   FINDINGS:     ABDOMEN     LOWER CHEST:  No clinically significant abnormality identified in the visualized lower chest      LIVER/BILIARY TREE:  Unremarkable      GALLBLADDER:  Gallbladder is surgically absent      SPLEEN:  Multiple calcified granuloma      PANCREAS:  There is marked pancreatic atrophy which has progressed since the prior study      ADRENAL GLANDS:  2 5 cm left adrenal nodule most likely represents an adenoma, and is stable since a CT scan dated [place] 3, 2021      KIDNEYS/URETERS:  Bilateral calcifications, most which are felt to be vascular  No obstructing renal calculi  No hydronephrosis  Bilateral perinephric edema is noted, nonspecific      STOMACH AND BOWEL:  No evidence of obstruction  No bowel wall thickening      APPENDIX:  No findings to suggest appendicitis      ABDOMINOPELVIC CAVITY:  No ascites  No pneumoperitoneum  No lymphadenopathy      VESSELS:  Atherosclerotic changes are present  No evidence of aneurysm      PELVIS     REPRODUCTIVE ORGANS:  Surgical changes of prior hysterectomy      URINARY BLADDER:  Suprapubic Camargo catheter in place  Bladder is collapsed      ABDOMINAL WALL/INGUINAL REGIONS:  Fat-containing umbilical hernia      Moderate body wall edema     OSSEOUS STRUCTURES:  No acute fracture or destructive osseous lesion      IMPRESSION:        1  No acute inflammatory process in the abdomen or the pelvis  2   Large fat-containing umbilical hernia  · CXR:   FINDINGS:  Right jugular catheter in lower SVC      Heart size exaggerated by the portable projection      The lungs are clear  No pneumothorax or pleural effusion      Cervicothoracic fusion      IMPRESSION:     No acute cardiopulmonary disease        Incidental Findings:   · none    Test Results Pending at Discharge (will require follow up):   · none     Outpatient Tests Requested:  · PCP     Complications:  none    Reason for Admission: nausea     Hospital Course:   Max Mitchell is a 63 y o  female patient who originally presented to the hospital on 8/25/2022 due to nausea   CT abdomen pelvis without acute abnormality  She tolerated diet here  She was seen by cardiology without angina equivalent and they cleared her for discharge  She was seen by Nephrology and received regularly scheduled dialysis today prior to discharge  Her nausea resolved and was most likely secondary to viral gastroenteritis versus GERD  She should followup with her PCP outaptient  The patient, initially admitted to the hospital as inpatient, was discharged earlier than expected given the following: cleared by Nephro and Cardiology, nausea resovled and she was tolerating diet   Please see above list of diagnoses and related plan for additional information  Condition at Discharge: stable    Discharge Day Visit / Exam:   Subjective:  Doing well post HD  No further nausea  toelring diet  No vomitine  No fevers, chills  No CP or SOB  No other complaints  Wishes to go home     Vitals: Blood Pressure: 140/66 (08/26/22 1515)  Pulse: 58 (08/26/22 1515)  Temperature: 98 °F (36 7 °C) (08/26/22 1515)  Temp Source: Temporal (08/26/22 1515)  Respirations: 18 (08/26/22 1515)  Weight - Scale: 118 kg (260 lb 5 8 oz) (08/25/22 1534)  SpO2: 99 % (08/26/22 1515)  Exam:   Physical Exam   Vitals and nursing note reviewed  Constitutional:       General: She is not in acute distress  Appearance: She is obese  She is not toxic-appearing  Eyes:      Conjunctiva/sclera: Conjunctivae normal    Cardiovascular:      Rate and Rhythm: Normal rate and regular rhythm  Pulmonary:      Effort: Pulmonary effort is normal       Breath sounds: Normal breath sounds  Comments: Room air, decreased throughout   Abdominal:      General: Bowel sounds are normal       Palpations: Abdomen is soft  Tenderness: There is no abdominal tenderness  Genitourinary:     Comments: NELSON morales   Neurological:      Mental Status: She is alert  Mental status is at baseline     Psychiatric:         Mood and Affect: Mood normal      Discharge instructions/Information to patient and family:   See after visit summary for information provided to patient and family  Provisions for Follow-Up Care:  See after visit summary for information related to follow-up care and any pertinent home health orders  Disposition:   Home    Planned Readmission: none     Discharge Statement:  I spent 60 minutes discharging the patient  This time was spent on the day of discharge  I had direct contact with the patient on the day of discharge  Greater than 50% of the total time was spent examining patient, answering all patient questions, arranging and discussing plan of care with patient as well as directly providing post-discharge instructions  Additional time then spent on discharge activities  Discharge Medications:  See after visit summary for reconciled discharge medications provided to patient and/or family        **Please Note: This note may have been constructed using a voice recognition system**

## 2022-08-26 NOTE — PROGRESS NOTES
[address]  Progress Note - Kimberly Saldaña 1960, 63 y o  female MRN: [id number]  Unit/Bed#: E4 -01 Encounter: [id number]  Primary Care Provider: CHERELLE Marlow   Date and time admitted to hospital: 8/25/2022  3:28 PM    * Nausea  Assessment & Plan  51-year-old female with history of ESRD on dialysis, depression, diabetes mellitus, hypertension, hyperlipidemia, chronic systolic and diastolic CHF presenting with nausea for the past day  Patient denies any vomiting, chest pain, palpitations, dyspnea  Denies any fever or chills  Denies any recent travel or sick contact    · Differentials include gastroenteritis versus GERD versus ACS as possible anginal equivalent  · HS trop 98, 96, 92   · CT A/P without acute abnormality   · Continue supportive care   · Await cardiology recs   · Denies chest pain     Chronic combined systolic and diastolic congestive heart failure (HCC)  Assessment & Plan  Wt Readings from Last 3 Encounters:   08/25/22 118 kg (260 lb 5 8 oz)   08/18/22 117 kg (258 lb 9 6 oz)   08/01/22 120 kg (265 lb 6 4 oz)     · Echocardiogram in 06/29/2022 revealed Ejection fraction 38%  · Continue torsemide 40 mg b i d   · volume control with dialysis    Continuous opioid dependence ([address] )  Assessment & Plan  · Patient is on oxycodone 5 mg q 8 hours p r n    · PDMP reviewed    Mixed hyperlipidemia  Assessment & Plan  · Continue statin    Acquired hypothyroidism  Assessment & Plan  · Continue Levothyroxine    Gastroesophageal reflux disease without esophagitis  Assessment & Plan  · Continue PPI    Neurogenic bladder  Assessment & Plan  · With chronic indwelling suprapubic catheter    ESRD (end stage renal disease) [facility])  Assessment & Plan  Lab Results   Component Value Date    EGFR 19 08/26/2022    EGFR 21 08/25/2022    EGFR 14 07/18/2022    CREATININE 2 61 (H) 08/26/2022    CREATININE 2 36 (H) 08/25/2022    CREATININE 3 33 (H) 07/18/2022   · On dialysis MWF  · Nephrology consultation for dialysis  · Receiving dialysis this morning per regular schedule     HTN (hypertension)  Assessment & Plan  · Continue losartan 25 mg daily, bisoprolol 2 5 mg daily, isosorbide 60 mg in the morning and 30 mg HS, torsemide 40 mg bid  · Blood pressure elevated this morning   · She is currently receiving dialysis, monitor BP post dialysis treatment     CAD (coronary artery disease)  Assessment & Plan  · Patient has history of multiple stents to LAD, stenting to RCA in August 2021  · Recent cardiac catheterization on 07/01/2022 revealed mid circumflex lesion 80%, proximal RCA lesion 95% diffuse moderate disease of LAD status post JANET of mid circumflex  · Maintained on aspirin, Plavix, isosorbide, statin, bisoprolol   · Consult to cardiology   · No chest pain currently     Type 2 diabetes mellitus with stage 5 chronic kidney disease not on chronic dialysis, with long-term current use of insulin [facility])  Assessment & Plan  Lab Results   Component Value Date    HGBA1C 7 0 (A) 08/01/2022       Recent Labs     08/25/22 2120 08/26/22  0740   POCGLU 412* 358*       Blood Sugar Average: Last 72 hrs:  · (P) 385   · at home patient is maintained on Lantus 25 units b i d    · Her A1c is well controlled but she is having hyperglycemia here   · Increase lantus 28 units bid   · Monitor Accu-Cheks, sliding scale for coverage    Major depressive disorder  Assessment & Plan  · Continue citalopram 40 mg daily, zyprexa 5 mg HS        VTE Pharmacologic Prophylaxis: VTE Score: 3 Moderate Risk (Score 3-4) - Pharmacological DVT Prophylaxis Ordered: heparin  Discussions with Specialists or Other Care Team Provider: will touch base with cardiology    Education and Discussions with Family / Patient: Patient declined call to   Time Spent for Care: 20 minutes  More than 50% of total time spent on counseling and coordination of care as described above      Current Length of Stay: 1 day(s)  Current Patient Status: Inpatient   Certification Statement: The patient will continue to require additional inpatient hospital stay due to nausea   Discharge Plan: Anticipate discharge tomorrow to home  Code Status: Level 1 - Full Code    Subjective:   Doing better today  She is receiving HD  No CP or palpaitions  No SOB  No lighteadedness or dizziness  No abdominal pain  Still occassional nasuea no vomiting  Objective:     Vitals:   Temp (24hrs), Av 6 °F (36 4 °C), Min:97 1 °F (36 2 °C), Max:97 8 °F (36 6 °C)    Temp:  [97 1 °F (36 2 °C)-97 8 °F (36 6 °C)] 97 7 °F (36 5 °C)  HR:  [62-88] 62  Resp:  [13-18] 18  BP: (125-189)/(66-85) 125/66  SpO2:  [95 %-98 %] 98 %  Body mass index is 39 59 kg/m²  Input and Output Summary (last 24 hours): Intake/Output Summary (Last 24 hours) at 2022 1011  Last data filed at 2022 0919  Gross per 24 hour   Intake 1211 66 ml   Output --   Net 1211 66 ml       Physical Exam:   Physical Exam  Vitals and nursing note reviewed  Constitutional:       General: She is not in acute distress  Appearance: She is obese  She is not toxic-appearing  Eyes:      Conjunctiva/sclera: Conjunctivae normal    Cardiovascular:      Rate and Rhythm: Normal rate and regular rhythm  Pulmonary:      Effort: Pulmonary effort is normal       Breath sounds: Normal breath sounds  Comments: Room air, decreased throughout   Abdominal:      General: Bowel sounds are normal       Palpations: Abdomen is soft  Tenderness: There is no abdominal tenderness  Genitourinary:     Comments: SPT morales   Neurological:      Mental Status: She is alert  Mental status is at baseline     Psychiatric:         Mood and Affect: Mood normal           Additional Data:     Labs:  Results from last 7 days   Lab Units 22  0453   WBC Thousand/uL 7 60   HEMOGLOBIN g/dL 9 5*   HEMATOCRIT % 29 4*   PLATELETS Thousands/uL 191   NEUTROS PCT % 65   LYMPHS PCT % 26   MONOS PCT % 8 EOS PCT % 1     Results from last 7 days   Lab Units 08/26/22  0453   SODIUM mmol/L 138   POTASSIUM mmol/L 3 6   CHLORIDE mmol/L 101   CO2 mmol/L 31   BUN mg/dL 36*   CREATININE mg/dL 2 61*   ANION GAP mmol/L 6   CALCIUM mg/dL 8 4   ALBUMIN g/dL 2 7*   TOTAL BILIRUBIN mg/dL 0 24   ALK PHOS U/L 94   ALT U/L 18   AST U/L 13   GLUCOSE RANDOM mg/dL 381*         Results from last 7 days   Lab Units 08/26/22  0740 08/25/22  2120   POC GLUCOSE mg/dl 358* 412*               Lines/Drains:  Invasive Devices  Report    Peripheral Intravenous Line  Duration           Peripheral IV 08/25/22 Right Antecubital <1 day          Hemodialysis Catheter  Duration           HD Permanent Double Catheter 41 days          Drain  Duration           Suprapubic Catheter Non-latex 16 Fr  323 days                  Telemetry:  Telemetry Orders (From admission, onward)             48 Hour Telemetry Monitoring  Continuous x 48 hours        References:    Telemetry Guidelines   Question:  Reason for 48 Hour Telemetry  Answer:  Acute Decompensated CHF (continuous diuretic infusion or total diuretic dose > 200 mg daily, associated electrolyte derangement, ionotropic drip, history of ventricular arrhythmia, or new EF <35%)                            Imaging: Reviewed radiology reports from this admission including: abdominal/pelvic CT    Recent Cultures (last 7 days):         Last 24 Hours Medication List:   Current Facility-Administered Medications   Medication Dose Route Frequency Provider Last Rate    allopurinol  300 mg Oral Daily Jaylan Ann MD      aspirin  81 mg Oral Daily Jaylan Ann MD      atorvastatin  40 mg Oral After Dennis Mendoza MD      bisoprolol  2 5 mg Oral Daily Jaylan Ann MD      citalopram  40 mg Oral Daily Jaylan Ann MD      clopidogrel  75 mg Oral Daily Jaylan Ann MD      epoetin bernardino  2,000 Units Intravenous After Dialysis CHERELLE Christie      gabapentin  100 mg Oral BID Jaylan Ann MD  heparin (porcine)  5,000 Units Subcutaneous [facility] Tejas Khanna MD      insulin glargine  28 Units Subcutaneous Q12H [address] Vita Kent PA-C      insulin lispro  1-5 Units Subcutaneous [facility] Tejas Khanna MD      iron sucrose  50 mg Intravenous Weekly [facility]      isosorbide mononitrate  30 mg Oral QPM Tejas Khanna MD      isosorbide mononitrate  60 mg Oral Daily Tejas Khanna MD      levothyroxine  50 mcg Oral Early Morning Tejas Khanna MD      losartan  25 mg Oral Daily Tejas Khanna MD      OLANZapine  5 mg Oral HS Tejas Khanna MD      ondansetron  4 mg Intravenous Q6H PRN Tejas Khanna MD      oxyCODONE  5 mg Oral Q8H PRN Tejas Khanna MD      pantoprazole  40 mg Oral BID Tejas Khanna MD      tiZANidine  4 mg Oral Q8H PRN Tejas Khanna MD      torsemide  40 mg Oral BID Tejas Khanna MD          Today, Patient Was Seen By: Geovanna Wheeler PA-C    **Please Note: This note may have been constructed using a voice recognition system  **

## 2022-08-26 NOTE — ASSESSMENT & PLAN NOTE
This is a 65-year-old female with history of ESRD on dialysis, depression, diabetes mellitus, hypertension, hyperlipidemia, chronic systolic and diastolic CHF presenting with nausea for the past 1 day  Patient denies any vomiting, chest pain, palpitations, dyspnea  Denies any fever or chills    Denies any recent travel or sick contact    Patient reported upper abdominal pain and chest pressure to the ED however denies any chest pain to myself    · Differentials include gastroenteritis versus GERD versus ACS as possible anginal equivalent  · First set of troponin 98  · EKG revealed left bundle branch block which is chronic  · Will trend troponin, monitor on telemetry  · Patient is high risk given prior risk factors, cardiology input will be appreciated if increasing troponins

## 2022-08-26 NOTE — ASSESSMENT & PLAN NOTE
Lab Results   Component Value Date    HGBA1C 7 0 (A) 08/01/2022       Recent Labs     08/25/22 2120 08/26/22  0740   POCGLU 412* 358*       Blood Sugar Average: Last 72 hrs:  · (P) 385   · at home patient is maintained on Lantus 25 units b i d    · Her A1c is well controlled but she is having hyperglycemia here   · Increase lantus 28 units bid   · Monitor Accu-Cheks, sliding scale for coverage

## 2022-08-26 NOTE — ASSESSMENT & PLAN NOTE
· Continue losartan 25 mg daily, bisoprolol 2 5 mg daily, isosorbide 60 mg in the morning and 30 mg HS, torsemide 80 mg daily

## 2022-08-26 NOTE — ASSESSMENT & PLAN NOTE
29-year-old female with history of ESRD on dialysis, depression, diabetes mellitus, hypertension, hyperlipidemia, chronic systolic and diastolic CHF presenting with nausea for the past day  Patient denies any vomiting, chest pain, palpitations, dyspnea  Denies any fever or chills    Denies any recent travel or sick contact    · Differentials include gastroenteritis versus GERD  · Seen by cards without concerns for ACS   · CT A/P without acute abnormality   · Continue supportive care   · Tolerated diet and feels well to go home

## 2022-08-26 NOTE — ASSESSMENT & PLAN NOTE
Wt Readings from Last 3 Encounters:   08/25/22 118 kg (260 lb 5 8 oz)   08/18/22 117 kg (258 lb 9 6 oz)   08/01/22 120 kg (265 lb 6 4 oz)     · Echocardiogram in 06/29/2022 revealed Ejection fraction 38%  · Continue torsemide 40 mg b i d   · volume control with dialysis

## 2022-08-26 NOTE — ASSESSMENT & PLAN NOTE
Lab Results   Component Value Date    HGBA1C 7 0 (A) 08/01/2022       No results for input(s): POCGLU in the last 72 hours      Blood Sugar Average: Last 72 hrs:  ·  at home patient is maintained on Lantus 25 units b i d , will start at lower dose of Lantus 20 units b i d   · Hold with meal coverage  · Monitor Accu-Cheks, sliding scale for coverage

## 2022-08-26 NOTE — ASSESSMENT & PLAN NOTE
Lab Results   Component Value Date    EGFR 19 08/26/2022    EGFR 21 08/25/2022    EGFR 14 07/18/2022    CREATININE 2 61 (H) 08/26/2022    CREATININE 2 36 (H) 08/25/2022    CREATININE 3 33 (H) 07/18/2022   · On dialysis MWF  · Nephrology consultation for dialysis  · Receiving dialysis this morning per regular schedule

## 2022-08-26 NOTE — ASSESSMENT & PLAN NOTE
Wt Readings from Last 3 Encounters:   08/25/22 118 kg (260 lb 5 8 oz)   08/18/22 117 kg (258 lb 9 6 oz)   08/01/22 120 kg (265 lb 6 4 oz)     · Echocardiogram in 06/29/2022 revealed Ejection fraction 38%  · Patient is euvolemic  · Continue torsemide 40 mg b i d   · Further volume control with dialysis

## 2022-08-26 NOTE — ASSESSMENT & PLAN NOTE
· Continue losartan 25 mg daily, bisoprolol 2 5 mg daily, isosorbide 60 mg in the morning and 30 mg HS, torsemide 40 mg bid  · BP stable post HD treatment

## 2022-08-26 NOTE — PLAN OF CARE
Problem: Potential for Falls  Goal: Patient will remain free of falls  Description: INTERVENTIONS:  - Educate patient/family on patient safety including physical limitations  - Instruct patient to call for assistance with activity   - Consult OT/PT to assist with strengthening/mobility   - Keep Call bell within reach  - Keep bed low and locked with side rails adjusted as appropriate  - Keep care items and personal belongings within reach  - Initiate and maintain comfort rounds  - Make Fall Risk Sign visible to staff  - Offer Toileting every  Hours, in advance of need  - Initiate/Maintain alarm  - Obtain necessary fall risk management equipment:   - Apply yellow socks and bracelet for high fall risk patients  - Consider moving patient to room near nurses station  8/26/2022 1534 by Dominique Martini RN  Outcome: Adequate for Discharge  8/26/2022 0924 by Dominique Martini RN  Outcome: Progressing     Problem: PAIN - ADULT  Goal: Verbalizes/displays adequate comfort level or baseline comfort level  Description: Interventions:  - Encourage patient to monitor pain and request assistance  - Assess pain using appropriate pain scale  - Administer analgesics based on type and severity of pain and evaluate response  - Implement non-pharmacological measures as appropriate and evaluate response  - Consider cultural and social influences on pain and pain management  - Notify physician/advanced practitioner if interventions unsuccessful or patient reports new pain  8/26/2022 1534 by Dominique Martini RN  Outcome: Adequate for Discharge  8/26/2022 0924 by Dominique Martini RN  Outcome: Progressing     Problem: INFECTION - ADULT  Goal: Absence or prevention of progression during hospitalization  Description: INTERVENTIONS:  - Assess and monitor for signs and symptoms of infection  - Monitor lab/diagnostic results  - Monitor all insertion sites, i e  indwelling lines, tubes, and drains  - Monitor endotracheal if appropriate and nasal secretions for changes in amount and color  - [place] appropriate cooling/warming therapies per order  - Administer medications as ordered  - Instruct and encourage patient and family to use good hand hygiene technique  - Identify and instruct in appropriate isolation precautions for identified infection/condition  8/26/2022 1534 by Olga Pierce RN  Outcome: Adequate for Discharge  8/26/2022 0924 by Olga Pierce RN  Outcome: Progressing  Goal: Absence of fever/infection during neutropenic period  Description: INTERVENTIONS:  - Monitor WBC    8/26/2022 1534 by Olga Pierce RN  Outcome: Adequate for Discharge  8/26/2022 0924 by Olga Pierce RN  Outcome: Progressing     Problem: SAFETY ADULT  Goal: Patient will remain free of falls  Description: INTERVENTIONS:  - Educate patient/family on patient safety including physical limitations  - Instruct patient to call for assistance with activity   - Consult OT/PT to assist with strengthening/mobility   - Keep Call bell within reach  - Keep bed low and locked with side rails adjusted as appropriate  - Keep care items and personal belongings within reach  - Initiate and maintain comfort rounds  - Make Fall Risk Sign visible to staff  - Offer Toileting every  Hours, in advance of need  - Initiate/Maintain alarm  - Obtain necessary fall risk management equipment:   - Apply yellow socks and bracelet for high fall risk patients  - Consider moving patient to room near nurses station  8/26/2022 1534 by Olga Pierce RN  Outcome: Adequate for Discharge  8/26/2022 0924 by Olga Pierce RN  Outcome: Progressing  Goal: Maintain or return to baseline ADL function  Description: INTERVENTIONS:  -  Assess patient's ability to carry out ADLs; assess patient's baseline for ADL function and identify physical deficits which impact ability to perform ADLs (bathing, care of mouth/teeth, toileting, grooming, dressing, etc )  - Assess/evaluate cause of self-care deficits   - Assess range of motion  - Assess patient's mobility; develop plan if impaired  - Assess patient's need for assistive devices and provide as appropriate  - Encourage maximum independence but intervene and supervise when necessary  - Involve family in performance of ADLs  - Assess for home care needs following discharge   - Consider OT consult to assist with ADL evaluation and planning for discharge  - Provide patient education as appropriate  8/26/2022 1534 by Olga Pierce RN  Outcome: Adequate for Discharge  8/26/2022 0924 by Olga Pierce RN  Outcome: Progressing  Goal: Maintains/Returns to pre admission functional level  Description: INTERVENTIONS:  - Perform BMAT or MOVE assessment daily    - Set and communicate daily mobility goal to care team and patient/family/caregiver  - Collaborate with rehabilitation services on mobility goals if consulted  - Perform Range of Motion  times a day  - Reposition patient every  hours    - Dangle patient  times a day  - Stand patient  times a day  - Ambulate patient  times a day  - Out of bed to chair  times a day   - Out of bed for meals times a day  - Out of bed for toileting  - Record patient progress and toleration of activity level   8/26/2022 1534 by Olga Pierce RN  Outcome: Adequate for Discharge  8/26/2022 0924 by Olga Pierce RN  Outcome: Progressing     Problem: DISCHARGE PLANNING  Goal: Discharge to home or other facility with appropriate resources  Description: INTERVENTIONS:  - Identify barriers to discharge w/patient and caregiver  - Arrange for needed discharge resources and transportation as appropriate  - Identify discharge learning needs (meds, wound care, etc )  - Arrange for interpretive services to assist at discharge as needed  - Refer to Case Management Department for coordinating discharge planning if the patient needs post-hospital services based on physician/advanced practitioner order or complex needs related to functional status, cognitive ability, or social support system  8/26/2022 1534 by Franck Pulido RN  Outcome: Adequate for Discharge  8/26/2022 0924 by Franck Pulido RN  Outcome: Progressing     Problem: Knowledge Deficit  Goal: Patient/family/caregiver demonstrates understanding of disease process, treatment plan, medications, and discharge instructions  Description: Complete learning assessment and assess knowledge base  Interventions:  - Provide teaching at level of understanding  - Provide teaching via preferred learning methods  8/26/2022 1534 by Franck Pulido RN  Outcome: Adequate for Discharge  8/26/2022 0924 by Franck Pulido RN  Outcome: Progressing     Problem: Nutrition/Hydration-ADULT  Goal: Nutrient/Hydration intake appropriate for improving, restoring or maintaining nutritional needs  Description: Monitor and assess patient's nutrition/hydration status for malnutrition  Collaborate with interdisciplinary team and initiate plan and interventions as ordered  Monitor patient's weight and dietary intake as ordered or per policy  Utilize nutrition screening tool and intervene as necessary  Determine patient's food preferences and provide high-protein, high-caloric foods as appropriate       INTERVENTIONS:  - Monitor oral intake, urinary output, labs, and treatment plans  - Assess nutrition and hydration status and recommend course of action  - Evaluate amount of meals eaten  - Assist patient with eating if necessary   - Allow adequate time for meals  - Recommend/ encourage appropriate diets, oral nutritional supplements, and vitamin/mineral supplements  - Order, calculate, and assess calorie counts as needed  - Recommend, monitor, and adjust tube feedings and TPN/PPN based on assessed needs  - Assess need for intravenous fluids  - Provide specific nutrition/hydration education as appropriate  - Include patient/family/caregiver in decisions related to nutrition  8/26/2022 1534 by Pete Estes RN  Outcome: Adequate for Discharge  8/26/2022 0924 by Pete Estes RN  Outcome: Progressing

## 2022-08-26 NOTE — ASSESSMENT & PLAN NOTE
· Continue losartan 25 mg daily, bisoprolol 2 5 mg daily, isosorbide 60 mg in the morning and 30 mg HS, torsemide 40 mg bid  · Blood pressure elevated this morning   · She is currently receiving dialysis, monitor BP post dialysis treatment

## 2022-08-26 NOTE — CONSULTS
Consultation - Nephrology   Magdalena Baig 63 y o  female MRN: [id number]  Unit/Bed#: E4 -01 Encounter: [id number]    ASSESSMENT:    ESRD on HD (MWF)  -Location: [facility]  -Dry weight: 118kg, under dry WT currently will plan for 1L removal as tolerated  -Last HD 8/24, next HD today 8/26 then Monday 8/29 if remaining IP    Access  -HD permcath, eventual OP vascular follow up for fistula creation    Blood pressure  -Outpatient regimen:  Bisoprolol 2 5 mg daily, Imdur 60 mg a m  30 mg p m , losartan 25 mg daily, torsemide 80 mg daily  -currently on bisoprolol 2 5 mg daily, under 30 mg HS, 60 mg daily a m , losartan 25 mg daily, torsemide 40 mg b i d  CKD Anemia:  Recent hgb:9 5  Medications: Epogen 1800u with HD, venofer 50mg weekly    CKD MBD  -Routine OP monitoring of PTH/phos  -Not on binders as OP    Heart failure with reduced ejection fraction  -6/2022 TTE: EF 97%, diastolic dysfunction  -Volume removal with torsemide and UF via HD    Nausea  -Reports nausea prior to Wednesday HS session, no other symptoms  -Minimal oral intake recently  -Reports this has improved overnight, willing to try oral intake  -Care per primary    Urinary retention  -Chronic SBP morales placement  -UA: 500 glucose, 1+ ketones, small blood, trace leukocytes, 300 protein, no RBC, 1-2 WBC    Additional medical problems: DM2, CAD, depression    HISTORY OF PRESENT ILLNESS:  Requesting Physician: Elisabeth Sanchez MD  Reason for Consult: ESRD on HD    Magdalena Baig is a 63y o  year old female who was admitted to [facility] after presenting with Nausea  Patient reports onset of nausea without any other associated symptoms since wed  , with minimal relief, has not been able to eat/drink much since then due to lack of appetite  Denies chest pain/shortness of breath/diarrhea/abd pain  A renal consultation is requested today for assistance in the management of ESRD   Magdalena Baig is a known ESRD patient who undergoes maintenance hemodialysis at [facility] on MWF  PAST MEDICAL HISTORY:  Past Medical History:   Diagnosis Date    Abnormal liver function     Anemia     Anxiety     Arthritis     Chronic kidney disease     stage 3    Chronic narcotic dependence (HCC)     Chronic pain disorder     lower back, hands , neck and knees    Coronary artery disease     Depression     Diabetes mellitus ([facility] [address] [facility]     Elevated troponin 2/11/2022    GERD (gastroesophageal reflux disease)     no meds at present    Heart murmur     murmur    Hyperlipidemia     Hypertension     Neurogenic bladder     Open toe wound 12/2020    right big toe open calus but is dry at present    Renal disorder     Shortness of breath     exertion    Sleep apnea     doesn't use cpap    Suprapubic catheter ([facility] [address] )        PAST SURGICAL HISTORY:  Past Surgical History:   Procedure Laterality Date    AMPUTATION      ANGIOPLASTY  2017 5    BREAST EXCISIONAL BIOPSY Left     BREAST SURGERY      CARDIAC CATHETERIZATION      CARDIAC CATHETERIZATION N/A 7/1/2022    Procedure: Cardiac catheterization;  Surgeon: Kajal Rascon MD;  Location: [facility]; Service: Cardiology    CARDIAC CATHETERIZATION N/A 7/1/2022    Procedure: Cardiac pci;  Surgeon: Kajal Rascon MD;  Location: [facility];   Service: Cardiology    CARPAL TUNNEL RELEASE Bilateral     CERVICAL FUSION      HYSTERECTOMY  2008    IR SUPRAPUBIC CATHETER PLACEMENT  6/15/2021    IR TUNNELED DIALYSIS CATHETER PLACEMENT  7/15/2022    KNEE SURGERY      OOPHORECTOMY  2008    ME EXC SKIN BENIG 3 1-4 CM REMAINDR BODY N/A 12/21/2020    Procedure: EXCISION SEBACEOUS CYST X 5 SCALP;  Surgeon: Sherrie Long MD;  Location: [facility] MAIN OR;  Service: General    TOE AMPUTATION Left     TRIGGER FINGER RELEASE Right     4th Finger       ALLERGIES:  Allergies   Allergen Reactions    Codeine      Other reaction(s): Nausea and Vomiting    Latex Itching       SOCIAL HISTORY:  Social History     Substance and Sexual Activity   Alcohol Use Not Currently     Social History     Substance and Sexual Activity   Drug Use Never     Social History     Tobacco Use   Smoking Status Former Smoker    Packs/day: 1 00    Years: 35 00    Pack years: 35 00    Types: Cigarettes    Quit date: 2012    Years since quitting: 10 6   Smokeless Tobacco Never Used       FAMILY HISTORY:  Family History   Problem Relation Age of Onset    Stroke Father     Heart disease Father     No Known Problems Mother     No Known Problems Sister     No Known Problems Daughter     No Known Problems Maternal Grandmother     No Known Problems Maternal Grandfather     No Known Problems Paternal Grandmother     No Known Problems Paternal Grandfather     No Known Problems Maternal Aunt     No Known Problems Maternal Aunt     No Known Problems Maternal Aunt     No Known Problems Maternal Aunt     No Known Problems Maternal Aunt     No Known Problems Maternal Aunt     No Known Problems Paternal Aunt        MEDICATIONS:    Current Facility-Administered Medications:     allopurinol (ZYLOPRIM) tablet 300 mg, 300 mg, Oral, Daily, Ainsley Hatchet, MD, 300 mg at 08/26/22 7640    aspirin (ECOTRIN LOW STRENGTH) EC tablet 81 mg, 81 mg, Oral, Daily, Ainsley Hatchet, MD, 81 mg at 08/26/22 8166    atorvastatin (LIPITOR) tablet 40 mg, 40 mg, Oral, After Dinner, Ainsley Hatchet, MD, 40 mg at 08/25/22 2113    bisoprolol (ZEBETA) tablet 2 5 mg, 2 5 mg, Oral, Daily, Ainsley Hatchet, MD, 2 5 mg at 08/26/22 8464    citalopram (CeleXA) tablet 40 mg, 40 mg, Oral, Daily, Ainsley Hatchet, MD, 40 mg at 08/26/22 7730    clopidogrel (PLAVIX) tablet 75 mg, 75 mg, Oral, Daily, Ainsley Hatchet, MD, 75 mg at 08/26/22 9917    gabapentin (NEURONTIN) capsule 100 mg, 100 mg, Oral, BID, Ainsley Hatchet, MD, 100 mg at 08/26/22 4080    heparin (porcine) subcutaneous injection 5,000 Units, 5,000 Units, Subcutaneous, Q8H [address], 5,000 Units at 08/26/22 8300 **AND** [COMPLETED] Platelet count, , , Once, Ernie Ge MD    insulin glargine (LANTUS) subcutaneous injection 28 Units 0 28 mL, 28 Units, Subcutaneous, Q12H [facility], Vita Kent, SETH    insulin lispro (HumaLOG) 100 units/mL subcutaneous injection 1-5 Units, 1-5 Units, Subcutaneous, TID AC, 3 Units at 08/26/22 0827 **AND** Fingerstick Glucose (POCT), , , TID AC, Ernie Ge MD    isosorbide mononitrate (IMDUR) 24 hr tablet 30 mg, 30 mg, Oral, QPM, Ernie Ge MD, 30 mg at 08/25/22 2113    isosorbide mononitrate (IMDUR) 24 hr tablet 60 mg, 60 mg, Oral, Daily, Ernie Ge MD, 60 mg at 08/26/22 7217    levothyroxine tablet 50 mcg, 50 mcg, Oral, Early Morning, Ernie Ge MD, 50 mcg at 08/26/22 0509    losartan (COZAAR) tablet 25 mg, 25 mg, Oral, Daily, Ernie Ge MD, 25 mg at 08/26/22 0823    OLANZapine (ZyPREXA) tablet 5 mg, 5 mg, Oral, HS, Ernie Ge MD, 5 mg at 08/25/22 2113    ondansetron (ZOFRAN) injection 4 mg, 4 mg, Intravenous, Q6H PRN, Ernie Ge MD    oxyCODONE (ROXICODONE) IR tablet 5 mg, 5 mg, Oral, Q8H PRN, Ernie Ge MD, 5 mg at 08/26/22 0136    pantoprazole (PROTONIX) EC tablet 40 mg, 40 mg, Oral, BID, Ernie Ge MD, 40 mg at 08/26/22 5242    sodium chloride 0 9 % infusion, 100 mL/hr, Intravenous, Continuous, Debra Jeffers MD, Last Rate: 100 mL/hr at 08/26/22 0829, 100 mL/hr at 08/26/22 0829    tiZANidine (ZANAFLEX) tablet 4 mg, 4 mg, Oral, Q8H PRN, Ernie Ge MD    torsemide [facility]) tablet 40 mg, 40 mg, Oral, BID, Ernie Ge MD, 40 mg at 08/26/22 9010    REVIEW OF SYSTEMS:  A complete 10 point review of systems was performed and found to be negative unless otherwise noted below or in the HPI  Review of Systems   Constitutional: Negative for chills, diaphoresis and fatigue  Respiratory: Negative for cough, choking and shortness of breath  Cardiovascular: Negative  Gastrointestinal: Positive for nausea   Negative for constipation, diarrhea and vomiting  Genitourinary: Negative  Musculoskeletal: Negative  Neurological: Negative  Hematological: Negative  Psychiatric/Behavioral: Negative  All other systems reviewed and are negative  PHYSICAL EXAM:  Current Weight: Weight - Scale: 118 kg (260 lb 5 8 oz)  First Weight: Weight - Scale: 118 kg (260 lb 5 8 oz)  Vitals:    08/25/22 1923 08/25/22 1958 08/25/22 2328 08/26/22 0738   BP: (!) 179/82 (!) 181/81 (!) 172/83 162/80   BP Location: Left arm Left arm Left arm Left arm   Pulse: 78 76 77 68   Resp: 18 16 18 18   Temp:  97 8 °F (36 6 °C) (!) 97 1 °F (36 2 °C) 97 6 °F (36 4 °C)   TempSrc:  Temporal Temporal Temporal   SpO2: 97%  98% 98%   Weight:           Intake/Output Summary (Last 24 hours) at 8/26/2022 0910  Last data filed at 8/26/2022 0829  Gross per 24 hour   Intake 928 33 ml   Output --   Net 928 33 ml     Physical Exam  Vitals and nursing note reviewed  Constitutional:       General: She is not in acute distress  Appearance: Normal appearance  She is obese  She is not toxic-appearing or diaphoretic  Comments: Awake sitting in bed on HD   HENT:      Head: Normocephalic and atraumatic  Nose: Nose normal       Mouth/Throat:      Mouth: Mucous membranes are moist    Eyes:      General: No scleral icterus  Cardiovascular:      Rate and Rhythm: Normal rate and regular rhythm  Pulses: Normal pulses  Heart sounds: Normal heart sounds  Pulmonary:      Effort: Pulmonary effort is normal  No respiratory distress  Breath sounds: Normal breath sounds  No wheezing or rales  Chest:       Abdominal:      General: Abdomen is flat  There is no distension  Palpations: Abdomen is soft  Tenderness: There is no abdominal tenderness  Genitourinary:     Comments: Camargo catheter with clear yellow urine  Musculoskeletal:      Cervical back: Neck supple  Right lower leg: No edema  Left lower leg: No edema  Skin:     General: Skin is warm and dry  Capillary Refill: Capillary refill takes less than 2 seconds  Neurological:      General: No focal deficit present  Mental Status: She is alert and oriented to person, place, and time  Psychiatric:         Mood and Affect: Mood normal           Invasive Devices:      Lab Results:   Results from last 7 days   Lab Units 08/26/22  0453 08/25/22  2214 08/25/22  1713   WBC Thousand/uL 7 60  --  8 91   HEMOGLOBIN g/dL 9 5*  --  11 3*   HEMATOCRIT % 29 4*  --  32 5*   PLATELETS Thousands/uL 191 200 217   POTASSIUM mmol/L 3 6  --  4 3   CHLORIDE mmol/L 101  --  98   CO2 mmol/L 31  --  27   BUN mg/dL 36*  --  30*   CREATININE mg/dL 2 61*  --  2 36*   CALCIUM mg/dL 8 4  --  9 0   ALK PHOS U/L 94  --  112   ALT U/L 18  --  22   AST U/L 13  --  18     Lab Results   Component Value Date     2 (H) 07/16/2022    CALCIUM 8 4 08/26/2022    PHOS 4 1 07/17/2022       I have personally reviewed the blood work as stated above and in my note  I have personally reviewed CXR imaging studies  I have personally reviewed internal medicine note

## 2022-08-26 NOTE — ASSESSMENT & PLAN NOTE
Lab Results   Component Value Date    HGBA1C 7 0 (A) 08/01/2022       Blood Sugar Average: Last 72 hrs:  · (P) 287 [id number]   · at home patient is maintained on Lantus 25 units b i d    · Diabetic diet

## 2022-08-26 NOTE — ASSESSMENT & PLAN NOTE
· Patient has history of multiple stents to LAD, stenting to RCA in August 2021  · Recent cardiac catheterization on 07/01/2022 revealed mid circumflex lesion 80%, proximal RCA lesion 95% diffuse moderate disease of LAD status post JANET of mid circumflex  · Maintained on aspirin, Plavix, isosorbide, statin, bisoprolol   · Consult to cardiology   · No chest pain currently

## 2022-08-26 NOTE — ASSESSMENT & PLAN NOTE
79-year-old female with history of ESRD on dialysis, depression, diabetes mellitus, hypertension, hyperlipidemia, chronic systolic and diastolic CHF presenting with nausea for the past day  Patient denies any vomiting, chest pain, palpitations, dyspnea  Denies any fever or chills    Denies any recent travel or sick contact    · Differentials include gastroenteritis versus GERD versus ACS as possible anginal equivalent  · HS trop 98, 96, 92   · CT A/P without acute abnormality   · Continue supportive care   · Await cardiology recs   · Denies chest pain

## 2022-08-26 NOTE — ASSESSMENT & PLAN NOTE
· Patient has history of multiple stents to LAD, stenting to RCA in August 2021  · July 2022 cardiac cath: mid cx 80% stenoses, prox RCA 95 % stenosed s/p PCI with JANET mid Cx   · Recent cardiac catheterization on 07/01/2022 revealed mid circumflex lesion 80%, proximal RCA lesion 95% diffuse moderate disease of LAD status post JANET of mid circumflex  · Maintained on aspirin, Plavix, isosorbide, statin, bisoprolol   · No changes to meds or inpatient cardiac workup needed   · No chest pain   · Cleared by cardiology for discharge

## 2022-08-26 NOTE — PLAN OF CARE
Problem: Potential for Falls  Goal: Patient will remain free of falls  Description: INTERVENTIONS:  - Educate patient/family on patient safety including physical limitations  - Instruct patient to call for assistance with activity   - Consult OT/PT to assist with strengthening/mobility   - Keep Call bell within reach  - Keep bed low and locked with side rails adjusted as appropriate  - Keep care items and personal belongings within reach  - Initiate and maintain comfort rounds  - Make Fall Risk Sign visible to staff  - Offer Toileting every  Hours, in advance of need  - Initiate/Maintain alarm  - Obtain necessary fall risk management equipment:   - Apply yellow socks and bracelet for high fall risk patients  - Consider moving patient to room near nurses station  Outcome: Progressing     Problem: PAIN - ADULT  Goal: Verbalizes/displays adequate comfort level or baseline comfort level  Description: Interventions:  - Encourage patient to monitor pain and request assistance  - Assess pain using appropriate pain scale  - Administer analgesics based on type and severity of pain and evaluate response  - Implement non-pharmacological measures as appropriate and evaluate response  - Consider cultural and social influences on pain and pain management  - Notify physician/advanced practitioner if interventions unsuccessful or patient reports new pain  Outcome: Progressing     Problem: INFECTION - ADULT  Goal: Absence or prevention of progression during hospitalization  Description: INTERVENTIONS:  - Assess and monitor for signs and symptoms of infection  - Monitor lab/diagnostic results  - Monitor all insertion sites, i e  indwelling lines, tubes, and drains  - Monitor endotracheal if appropriate and nasal secretions for changes in amount and color  - [place] appropriate cooling/warming therapies per order  - Administer medications as ordered  - Instruct and encourage patient and family to use good hand hygiene technique  - Identify and instruct in appropriate isolation precautions for identified infection/condition  Outcome: Progressing  Goal: Absence of fever/infection during neutropenic period  Description: INTERVENTIONS:  - Monitor WBC    Outcome: Progressing     Problem: SAFETY ADULT  Goal: Patient will remain free of falls  Description: INTERVENTIONS:  - Educate patient/family on patient safety including physical limitations  - Instruct patient to call for assistance with activity   - Consult OT/PT to assist with strengthening/mobility   - Keep Call bell within reach  - Keep bed low and locked with side rails adjusted as appropriate  - Keep care items and personal belongings within reach  - Initiate and maintain comfort rounds  - Make Fall Risk Sign visible to staff  - Offer Toileting every  Hours, in advance of need  - Initiate/Maintain alarm  - Obtain necessary fall risk management equipment:   - Apply yellow socks and bracelet for high fall risk patients  - Consider moving patient to room near nurses station  Outcome: Progressing  Goal: Maintain or return to baseline ADL function  Description: INTERVENTIONS:  -  Assess patient's ability to carry out ADLs; assess patient's baseline for ADL function and identify physical deficits which impact ability to perform ADLs (bathing, care of mouth/teeth, toileting, grooming, dressing, etc )  - Assess/evaluate cause of self-care deficits   - Assess range of motion  - Assess patient's mobility; develop plan if impaired  - Assess patient's need for assistive devices and provide as appropriate  - Encourage maximum independence but intervene and supervise when necessary  - Involve family in performance of ADLs  - Assess for home care needs following discharge   - Consider OT consult to assist with ADL evaluation and planning for discharge  - Provide patient education as appropriate  Outcome: Progressing  Goal: Maintains/Returns to pre admission functional level  Description: INTERVENTIONS:  - Perform BMAT or MOVE assessment daily    - Set and communicate daily mobility goal to care team and patient/family/caregiver  - Collaborate with rehabilitation services on mobility goals if consulted  - Perform Range of Motion  times a day  - Reposition patient every  hours  - Dangle patient  times a day  - Stand patient  times a day  - Ambulate patient  times a day  - Out of bed to chair  times a day   - Out of bed for meals times a day  - Out of bed for toileting  - Record patient progress and toleration of activity level   Outcome: Progressing     Problem: DISCHARGE PLANNING  Goal: Discharge to home or other facility with appropriate resources  Description: INTERVENTIONS:  - Identify barriers to discharge w/patient and caregiver  - Arrange for needed discharge resources and transportation as appropriate  - Identify discharge learning needs (meds, wound care, etc )  - Arrange for interpretive services to assist at discharge as needed  - Refer to Case Management Department for coordinating discharge planning if the patient needs post-hospital services based on physician/advanced practitioner order or complex needs related to functional status, cognitive ability, or social support system  Outcome: Progressing     Problem: Knowledge Deficit  Goal: Patient/family/caregiver demonstrates understanding of disease process, treatment plan, medications, and discharge instructions  Description: Complete learning assessment and assess knowledge base  Interventions:  - Provide teaching at level of understanding  - Provide teaching via preferred learning methods  Outcome: Progressing     Problem: Nutrition/Hydration-ADULT  Goal: Nutrient/Hydration intake appropriate for improving, restoring or maintaining nutritional needs  Description: Monitor and assess patient's nutrition/hydration status for malnutrition  Collaborate with interdisciplinary team and initiate plan and interventions as ordered    Monitor patient's weight and dietary intake as ordered or per policy  Utilize nutrition screening tool and intervene as necessary  Determine patient's food preferences and provide high-protein, high-caloric foods as appropriate       INTERVENTIONS:  - Monitor oral intake, urinary output, labs, and treatment plans  - Assess nutrition and hydration status and recommend course of action  - Evaluate amount of meals eaten  - Assist patient with eating if necessary   - Allow adequate time for meals  - Recommend/ encourage appropriate diets, oral nutritional supplements, and vitamin/mineral supplements  - Order, calculate, and assess calorie counts as needed  - Recommend, monitor, and adjust tube feedings and TPN/PPN based on assessed needs  - Assess need for intravenous fluids  - Provide specific nutrition/hydration education as appropriate  - Include patient/family/caregiver in decisions related to nutrition  Outcome: Progressing

## 2022-08-26 NOTE — H&P
[facility]  H&P- Kyrie Nina 1960, 63 y o  female MRN: [id number]  Unit/Bed#: E4 -01 Encounter: [id number]  Primary Care Provider: CHERELLE Villanueva   Date and time admitted to hospital: 8/25/2022  3:28 PM    * Nausea  Assessment & Plan  This is a 53-year-old female with history of ESRD on dialysis, depression, diabetes mellitus, hypertension, hyperlipidemia, chronic systolic and diastolic CHF presenting with nausea for the past 1 day  Patient denies any vomiting, chest pain, palpitations, dyspnea  Denies any fever or chills  Denies any recent travel or sick contact    · Differentials include gastroenteritis versus GERD versus ACS as possible anginal equivalent  · First set of troponin 98  · EKG revealed left bundle branch block which is chronic  · Will trend troponin, monitor on telemetry  · Patient is high risk given prior risk factors, cardiology input will be appreciated if increasing troponins    Continuous opioid dependence ([facility] [address] )  Assessment & Plan  · Patient is on oxycodone 5 mg q 8 hours p r n    · PDMP reviewed    Mixed hyperlipidemia  Assessment & Plan  · Continue statin    Acquired hypothyroidism  Assessment & Plan  · Continue Levothyroxine    Gastroesophageal reflux disease without esophagitis  Assessment & Plan  · Continue PPI    Chronic combined systolic and diastolic congestive heart failure (HCC)  Assessment & Plan  Wt Readings from Last 3 Encounters:   08/25/22 118 kg (260 lb 5 8 oz)   08/18/22 117 kg (258 lb 9 6 oz)   08/01/22 120 kg (265 lb 6 4 oz)     · Echocardiogram in 06/29/2022 revealed Ejection fraction 38%  · Patient is euvolemic  · Continue torsemide 40 mg b i d   · Further volume control with dialysis    Neurogenic bladder  Assessment & Plan  · With chronic indwelling suprapubic catheter    ESRD (end stage renal disease) [facility])  Assessment & Plan  Lab Results   Component Value Date    EGFR 21 08/25/2022    EGFR 14 07/18/2022    EGFR 15 07/17/2022    CREATININE 2 36 (H) 08/25/2022    CREATININE 3 33 (H) 07/18/2022    CREATININE 3 20 (H) 07/17/2022   · On dialysis MWF  · Nephrology consultation for dialysis    HTN (hypertension)  Assessment & Plan  · Continue losartan 25 mg daily, bisoprolol 2 5 mg daily, isosorbide 60 mg in the morning and 30 mg HS, torsemide 80 mg daily    CAD (coronary artery disease)  Assessment & Plan  · Patient has history of multiple stents to LAD, stenting to RCA in August 2021  · Recent cardiac catheterization on 07/01/2022 revealed mid circumflex lesion 80%, proximal RCA lesion 95% diffuse moderate disease of LAD status post JANET of mid circumflex  · Maintained on aspirin, Plavix, isosorbide, statin    Type 2 diabetes mellitus with stage 5 chronic kidney disease not on chronic dialysis, with long-term current use of insulin ([facility] )  Assessment & Plan  Lab Results   Component Value Date    HGBA1C 7 0 (A) 08/01/2022       No results for input(s): POCGLU in the last 72 hours  Blood Sugar Average: Last 72 hrs:  ·  at home patient is maintained on Lantus 25 units b i d , will start at lower dose of Lantus 20 units b i d   · Hold with meal coverage  · Monitor Accu-Cheks, sliding scale for coverage    Major depressive disorder  Assessment & Plan  · Continue citalopram, olanzapine HS        VTE Prophylaxis: Heparin  / sequential compression device   Code Status: Full  POLST: There is no POLST form on file for this patient (pre-hospital)    Anticipated Length of Stay:  Patient will be admitted on an Inpatient basis with an anticipated length of stay of  greater than 2 midnights  Justification for Hospital Stay:  Nausea, elevated troponin    Total Time for Visit, including Counseling / Coordination of Care: 45 minutes  Greater than 50% of this total time spent on direct patient counseling and coordination of care      Chief Complaint:   Nausea    History of Present Illness:    Steve Dubon is a 63 y o  female who presents with nausea  Patient has history of ESRD on dialysis, depression, diabetes mellitus, hypertension, hyperlipidemia, chronic systolic and diastolic CHF presenting with nausea for the past 1 day  Patient denies any vomiting, chest pain, palpitations, dyspnea  Denies any fever or chills  Denies any recent travel or sick contact    Review of Systems:    Review of Systems   Constitutional: Negative  HENT: Negative  Eyes: Negative  Respiratory: Negative  Cardiovascular: Negative  Gastrointestinal: Positive for nausea  Endocrine: Negative  Genitourinary: Negative  Musculoskeletal: Negative  Skin: Negative  Allergic/Immunologic: Negative  Neurological: Negative  Hematological: Negative  Psychiatric/Behavioral: Negative  Past Medical and Surgical History:     Past Medical History:   Diagnosis Date    Abnormal liver function     Anemia     Anxiety     Arthritis     Chronic kidney disease     stage 3    Chronic narcotic dependence (HCC)     Chronic pain disorder     lower back, hands , neck and knees    Coronary artery disease     Depression     Diabetes mellitus (HCC)     Elevated troponin 2/11/2022    GERD (gastroesophageal reflux disease)     no meds at present    Heart murmur     murmur    Hyperlipidemia     Hypertension     Neurogenic bladder     Open toe wound 12/2020    right big toe open calus but is dry at present    Renal disorder     Shortness of breath     exertion    Sleep apnea     doesn't use cpap    Suprapubic catheter [facility])        Past Surgical History:   Procedure Laterality Date    AMPUTATION      ANGIOPLASTY  2017 5    BREAST EXCISIONAL BIOPSY Left     BREAST SURGERY      CARDIAC CATHETERIZATION      CARDIAC CATHETERIZATION N/A 7/1/2022    Procedure: Cardiac catheterization;  Surgeon: Gail Alvarez MD;  Location: [facility];   Service: Cardiology    CARDIAC CATHETERIZATION N/A 7/1/2022    Procedure: Cardiac pci;  Surgeon: Jumana Tamez MD;  Location: [facility]; Service: Cardiology    CARPAL TUNNEL RELEASE Bilateral     CERVICAL FUSION      HYSTERECTOMY  2008    IR SUPRAPUBIC CATHETER PLACEMENT  6/15/2021    IR TUNNELED DIALYSIS CATHETER PLACEMENT  7/15/2022    KNEE SURGERY      OOPHORECTOMY  2008    NJ EXC SKIN BENIG 3 1-4 CM REMAINDR BODY N/A 12/21/2020    Procedure: EXCISION SEBACEOUS CYST X 5 SCALP;  Surgeon: Milena Moulton MD;  Location: [address] [place] [address] OR;  Service: General    TOE AMPUTATION Left     TRIGGER FINGER RELEASE Right     4th Finger       Meds/Allergies:    Prior to Admission medications    Medication Sig Start Date End Date Taking?  Authorizing Provider   allopurinol (ZYLOPRIM) 300 mg tablet Take 1 tablet (300 mg total) by mouth daily 3/29/22   CHERELLE Ortiz   aspirin (ECOTRIN LOW STRENGTH) 81 mg EC tablet Take 1 tablet (81 mg total) by mouth daily 3/29/22   CHERELLE Ortiz   atorvastatin (LIPITOR) 40 mg tablet Take 1 tablet (40 mg total) by mouth daily 3/29/22   CHERELLE Ortiz   bisoprolol (ZEBETA) 5 mg tablet Take 0 5 tablets (2 5 mg total) by mouth daily 7/2/22   Jayson aDve MD   citalopram (CeleXA) 40 mg tablet Take 1 tablet (40 mg total) by mouth daily 3/29/22   CHERELLE Ortiz   clopidogrel (PLAVIX) 75 mg tablet Take 1 tablet (75 mg total) by mouth daily 3/29/22   CHERELLE Ortiz   docusate sodium (COLACE) 50 mg capsule Take 1 capsule (50 mg total) by mouth 2 (two) times a day 8/1/22   CHERELLE Ortiz   Dulaglutide (Trulicity) 1 5 ML/0 3MX SOPN Inject 0 5 mL (1 5 mg total) under the skin once a week 3/29/22   CHERELLE Ortiz   ferrous sulfate 325 (65 Fe) mg tablet TAKE 1 TABLET BY MOUTH EVERY OTHER DAY 7/28/22   CHERELLE Ortiz   gabapentin (Neurontin) 100 mg capsule Take 1 capsule (100 mg total) by mouth 2 (two) times a day 8/2/22   CHERELLE Ortiz   insulin glargine (Lantus SoloStar) 100 units/mL injection pen Inject 25 Units under the skin every 12 (twelve) hours 7/19/22   Mariaa Freeman DO   insulin lispro (HumaLOG) 100 units/mL injection pen Inject 10 Units under the skin 3 (three) times a day with meals 7/19/22   Mariaa Freeman DO   Insulin Pen Needle (BD Pen Needle Micro U/F) 32G X 6 MM MISC Use 5 (five) times a day 3/29/22   CHERELLE Richardson   Insulin Syringe-Needle U-100 (BD Veo Insulin Syringe U/F) 31G X 15/64" 0 5 ML MISC Inject under the skin 3 (three) times a day 3/29/22   CHERELLE Richardson   isosorbide mononitrate (IMDUR) 30 mg 24 hr tablet Take 1 tablet (30 mg total) by mouth every evening 7/19/22   Mariaa Freeman DO   isosorbide mononitrate (IMDUR) 60 mg 24 hr tablet TAKE 1 TABLET BY MOUTH EVERY DAY 3/1/22   Durward Canavan, DO   Lancets (OneTouch Delica Plus [id number]) MISC USE TO TEST 3 TIMES DAILY 3/10/21   CHERELLE Perez   levothyroxine 50 mcg tablet TAKE 1 TABLET BY MOUTH EVERY DAY 2/4/22   CHERELLE Richardson   losartan (COZAAR) 25 mg tablet TAKE 1 TABLET (25 MG TOTAL) BY MOUTH DAILY  8/17/22   CHERELLE Richardson   naloxone [facility]) 4 mg/0 1 mL nasal spray Administer 1 spray into a nostril  If breathing does not return to normal or if breathing difficulty resumes after 2-3 minutes, give another dose in the other nostril using a new spray   3/10/21   CHERELLE Perez   nitroglycerin (NITROSTAT) 0 4 mg SL tablet Place 1 tablet (0 4 mg total) under the tongue every 5 (five) minutes as needed for chest pain 4/26/21   Durward Canavan, DO   nystatin (MYCOSTATIN) powder Apply topically 2 (two) times a day 9/10/21   Tim Valverde PA-C   OLANZapine (ZyPREXA) 5 mg tablet Take 1 tablet (5 mg total) by mouth daily at bedtime 7/22/22   CHERELLE Taylor   ondansetron (ZOFRAN-ODT) 4 mg disintegrating tablet Take 1 tablet (4 mg total) by mouth every 8 (eight) hours as needed for nausea or vomiting 6/19/22   Abdon Banerjee DO   OneTouch Ultra test strip USE 1 EACH DAILY USE AS INSTRUCTED 2/21/22   CHERELLE Richardson oxyCODONE (ROXICODONE) 5 immediate release tablet Take 1 tablet (5 mg total) by mouth every 8 (eight) hours as needed for moderate pain Max Daily Amount: 15 mg 8/1/22   CHERELLE Reyes   pantoprazole (PROTONIX) 40 mg tablet TAKE 1 TABLET BY MOUTH TWICE A DAY 2/21/22   Dominique Posada MD   tiZANidine (ZANAFLEX) 4 mg tablet TAKE 1 TABLET (4 MG TOTAL) BY MOUTH EVERY 8 (EIGHT) HOURS AS NEEDED FOR MUSCLE SPASMS 5/27/22   CHERELLE Reyes   torsemide 40 MG TABS Take 80 mg by mouth daily 7/20/22   Roya Mishra DO   oxygen gas Inhale 2 L/min continuous  Indications: Respiratory Failure 1/1/20 6/11/22  Historical Provider, MD     I have reviewed home medications with patient personally  Allergies:    Allergies   Allergen Reactions    Codeine      Other reaction(s): Nausea and Vomiting    Latex Itching       Social History:     Social History     Substance and Sexual Activity   Alcohol Use Not Currently     Social History     Tobacco Use   Smoking Status Former Smoker    Packs/day: 1 00    Years: 35 00    Pack years: 35 00    Types: Cigarettes    Quit date: 2012    Years since quitting: 10 6   Smokeless Tobacco Never Used     Social History     Substance and Sexual Activity   Drug Use Never       Family History:    Family History   Problem Relation Age of Onset    Stroke Father     Heart disease Father     No Known Problems Mother     No Known Problems Sister     No Known Problems Daughter     No Known Problems Maternal Grandmother     No Known Problems Maternal Grandfather     No Known Problems Paternal Grandmother     No Known Problems Paternal Grandfather     No Known Problems Maternal Aunt     No Known Problems Maternal Aunt     No Known Problems Maternal Aunt     No Known Problems Maternal Aunt     No Known Problems Maternal Aunt     No Known Problems Maternal Aunt     No Known Problems Paternal Aunt        Physical Exam:     Vitals:   Blood Pressure: (!) 181/81 (08/25/22 1958)  Pulse: 76 (08/25/22 1958)  Temperature: 97 8 °F (36 6 °C) (08/25/22 1958)  Temp Source: Temporal (08/25/22 1958)  Respirations: 16 (08/25/22 1958)  Weight - Scale: 118 kg (260 lb 5 8 oz) (08/25/22 1534)  SpO2: 97 % (08/25/22 1923)    Constitutional: Patient is oriented to person, place and time, no acute distress  HEENT:  Normocephalic, atraumatic  Cardiovascular: Normal S1S2, RRR, No murmurs/rubs/gallops appreciated  Pulmonary:  Bilateral air entry, No rhonchi/rales/wheezing appreciated  Abdominal: Soft, Bowel sounds present, Non-tender, Non-distended  Extremities:  No cyanosis, clubbing or edema  Neurological: Cranial nerves II-XII grossly intact, sensation intact, otherwise no focal neurological symptoms  Suprapubic catheter in place    Additional Data:     Lab Results: I have personally reviewed pertinent reports  Results from last 7 days   Lab Units 08/25/22  1713   WBC Thousand/uL 8 91   HEMOGLOBIN g/dL 11 3*   HEMATOCRIT % 32 5*   PLATELETS Thousands/uL 217   NEUTROS PCT % 79*   LYMPHS PCT % 15   MONOS PCT % 6   EOS PCT % 0     Results from last 7 days   Lab Units 08/25/22  1713   POTASSIUM mmol/L 4 3   CHLORIDE mmol/L 98   CO2 mmol/L 27   BUN mg/dL 30*   CREATININE mg/dL 2 36*   CALCIUM mg/dL 9 0   ALK PHOS U/L 112   ALT U/L 22   AST U/L 18           Imaging: I have personally reviewed pertinent reports  CT abdomen pelvis wo contrast    Result Date: 8/25/2022  Narrative: CT ABDOMEN AND PELVIS WITHOUT IV CONTRAST INDICATION:   Epigastric pain epigastic pain, n/v  COMPARISON:  Most recent prior CT scan is dated June 28, 2021 TECHNIQUE:  CT examination of the abdomen and pelvis was performed without intravenous contrast  Axial, sagittal, and coronal 2D reformatted images were created from the source data and submitted for interpretation  Radiation dose length product (DLP) for this visit:  1371 mGy-cm     This examination, like all CT scans performed in the [facility], was performed utilizing techniques to minimize radiation dose exposure, including the use of iterative reconstruction and automated exposure control  Enteric contrast was not administered  FINDINGS: ABDOMEN LOWER CHEST:  No clinically significant abnormality identified in the visualized lower chest  LIVER/BILIARY TREE:  Unremarkable  GALLBLADDER:  Gallbladder is surgically absent  SPLEEN:  Multiple calcified granuloma  PANCREAS:  There is marked pancreatic atrophy which has progressed since the prior study  ADRENAL GLANDS:  2 5 cm left adrenal nodule most likely represents an adenoma, and is stable since a CT scan dated [place] 3, 2021  KIDNEYS/URETERS:  Bilateral calcifications, most which are felt to be vascular  No obstructing renal calculi  No hydronephrosis  Bilateral perinephric edema is noted, nonspecific  STOMACH AND BOWEL:  No evidence of obstruction  No bowel wall thickening  APPENDIX:  No findings to suggest appendicitis  ABDOMINOPELVIC CAVITY:  No ascites  No pneumoperitoneum  No lymphadenopathy  VESSELS:  Atherosclerotic changes are present  No evidence of aneurysm  PELVIS REPRODUCTIVE ORGANS:  Surgical changes of prior hysterectomy  URINARY BLADDER:  Suprapubic Camargo catheter in place  Bladder is collapsed  ABDOMINAL WALL/INGUINAL REGIONS:  Fat-containing umbilical hernia  Moderate body wall edema OSSEOUS STRUCTURES:  No acute fracture or destructive osseous lesion  Impression: 1  No acute inflammatory process in the abdomen or the pelvis  2   Large fat-containing umbilical hernia  Workstation performed: [id number]       EKG, Pathology, and Other Studies Reviewed on Admission:   · EKG:  Chronic left bundle-branch block    Allscripts / Epic Records Reviewed: Yes     ** Please Note: This note has been constructed using a voice recognition system   **

## 2022-08-26 NOTE — UTILIZATION REVIEW
Initial Clinical Review    Admission: Date/Time/Statement:   Admission Orders (From admission, onward)     Ordered        08/25/22 1814  INPATIENT ADMISSION  Once                      Orders Placed This Encounter   Procedures    INPATIENT ADMISSION     Standing Status:   Standing     Number of Occurrences:   1     Order Specific Question:   Level of Care     Answer:   Med Surg [16]     Order Specific Question:   Estimated length of stay     Answer:   More than 2 Midnights     Order Specific Question:   Certification     Answer:   I certify that inpatient services are medically necessary for this patient for a duration of greater than two midnights  See H&P and MD Progress Notes for additional information about the patient's course of treatment  ED Arrival Information     Expected   -    Arrival   8/25/2022 15:28    Acuity   Urgent            Means of arrival   Ambulance    Escorted by   [place] ([address])    Service   Hospitalist    Admission type   Urgent            Arrival complaint   nausea           Chief Complaint   Patient presents with    Nausea     Patient arrives ems coming from home c/o nausea and dizziness, patient denies vomiting and diarrhea  Patient dialysis M-W-F went yesterday without complications  Pt hx of htn  Initial Presentation: 63 y o  female presents to the ED via EMS from home with c/o nausea w/o vomiting x 1 day  PMH: ESRD on HD MWF, depression, IDDM, HTN, HLD, combined CHF  In the ED she had Elevated lipase, troponins, BUN/Cr, glucose 403, UA + UTI  Imaging shows large fat containing umbilical hernia, neurogenic bladder with perm morales  Treated with IV Zofran x 2  On exam abd nontender, nondistended  She is admitted to INPATIENT status with Nausea - diff dx - gastroenteritis versus GERD versus ACS as possible anginal equivalent - trend troponins, Tele, cardio consult  Continuous opioid dependence - Oxycodone PRN  ESRD on HD - Nephrology consult  Date: 8/26   Day 2:   Remains with elevated BP today  Hgb down to 9 5 BUN/Cr elevating  Pt had 3 hr HD today and is being d/c post tx      8/26 Nephrology Consult - received 3 hr HD today, nausea resolved  Lungs clear  Will resume HD On normal days  Restart Epogen and Venofer with dialysis  Continue home BP meds - Bisoprolol 2 5 mg p o  Q day, Imdur 30 mg p o  Q h s , 60 mg p o  Q a m , losartan 25 mg p o  Q day, torsemide 40 mg p o  B i d       8/26 Cardio Consult -presents with nausea, pt now has good BP control today  Continue Bisoprolol 2 5 mg daily, Isosorbide, BID, Losartan daily and Torsemide BID  ASA, Clopidogrel, Lipitor to continue daily  Pt will be moving to [place] in September and will establish care there  OK for d/c       ED Triage Vitals   Temperature Pulse Respirations Blood Pressure SpO2   08/25/22 1534 08/25/22 1534 08/25/22 1534 08/25/22 1534 08/25/22 1534   97 7 °F (36 5 °C) 88 18 (!) 185/83 96 %      Temp Source Heart Rate Source Patient Position - Orthostatic VS BP Location FiO2 (%)   08/25/22 1534 08/25/22 1534 08/25/22 1534 08/25/22 1534 --   Oral Monitor Lying Left arm       Pain Score       08/26/22 0136       8          Wt Readings from Last 1 Encounters:   08/25/22 118 kg (260 lb 5 8 oz)     Additional Vital Signs:   08/26/22 0738 97 6 °F (36 4 °C) 68 18 162/80 -- 98 % -- Lying   08/25/22 2328 97 1 °F (36 2 °C) Abnormal  77 18 172/83 Abnormal  104 98 % None (Room air) Sitting   08/25/22 1958 97 8 °F (36 6 °C) 76 16 181/81 Abnormal  -- -- -- Sitting   08/25/22 1923 -- 78 18 179/82 Abnormal  -- 97 % None (Room air) Lying   08/25/22 1745 -- 70 13 189/81 Abnormal  116 95 % None (Room air) Sitting     Pertinent Labs/Diagnostic Test Results:     8/25 ECG - Normal sinus rhythm  Left bundle branch block  Abnormal ECG    XR chest 1 view portable   Final Result by Klaudia Dave MD (08/26 3855)      No acute cardiopulmonary disease                    Workstation performed: [id number]         CT abdomen pelvis wo contrast   Final Result by Mandeep Randle MD (08/25 1649)         1  No acute inflammatory process in the abdomen or the pelvis  2   Large fat-containing umbilical hernia              Workstation performed: [id number]           Results from last 7 days   Lab Units 08/25/22  1713   SARS-COV-2  Negative     Results from last 7 days   Lab Units 08/26/22  0453 08/25/22  2214 08/25/22  1713   WBC Thousand/uL 7 60  --  8 91   HEMOGLOBIN g/dL 9 5*  --  11 3*   HEMATOCRIT % 29 4*  --  32 5*   PLATELETS Thousands/uL 191 200 217   NEUTROS ABS Thousands/µL 4 91  --  6 93         Results from last 7 days   Lab Units 08/26/22  0453 08/25/22  1713   SODIUM mmol/L 138 136   POTASSIUM mmol/L 3 6 4 3   CHLORIDE mmol/L 101 98   CO2 mmol/L 31 27   ANION GAP mmol/L 6 11   BUN mg/dL 36* 30*   CREATININE mg/dL 2 61* 2 36*   EGFR ml/min/1 73sq m 19 21   CALCIUM mg/dL 8 4 9 0     Results from last 7 days   Lab Units 08/26/22  0453 08/25/22  1713   AST U/L 13 18   ALT U/L 18 22   ALK PHOS U/L 94 112   TOTAL PROTEIN g/dL 5 9* 7 0   ALBUMIN g/dL 2 7* 3 2*   TOTAL BILIRUBIN mg/dL 0 24 0 48   BILIRUBIN DIRECT mg/dL  --  0 09     Results from last 7 days   Lab Units 08/26/22  0740 08/25/22  2120   POC GLUCOSE mg/dl 358* 412*     Results from last 7 days   Lab Units 08/26/22  0453 08/25/22  1713   GLUCOSE RANDOM mg/dL 381* 403*     Results from last 7 days   Lab Units 08/25/22 2214 08/25/22  1917 08/25/22  1713   HS TNI 0HR ng/L 92*  --  98*   HS TNI 2HR ng/L  --  96*  --    HSTNI D2 ng/L  --  -2  --      Results from last 7 days   Lab Units 08/25/22  1713   LIPASE u/L 45*                 Results from last 7 days   Lab Units 08/25/22  1928   CLARITY UA  Cloudy   COLOR UA  Yellow   SPEC GRAV UA  1 015   PH UA  >=9 0*   GLUCOSE UA mg/dl 500 (1/2%)*   KETONES UA mg/dl 15 (1+)*   BLOOD UA  Small*   PROTEIN UA mg/dl >=300*   NITRITE UA  Negative   BILIRUBIN UA  Negative   UROBILINOGEN UA E U /dl 0 2 LEUKOCYTES UA  Trace*   WBC UA /hpf 1-2*   RBC UA /hpf None Seen   BACTERIA UA /hpf Innumerable*   EPITHELIAL CELLS WET PREP /hpf None Seen     Results from last 7 days   Lab Units 08/25/22  1713   INFLUENZA A PCR  Negative   INFLUENZA B PCR  Negative   RSV PCR  Negative     ED Treatment:   Medication Administration from 08/25/2022 1528 to 08/25/2022 1956    Date/Time Order Dose Route Action   08/25/2022 1721 ondansetron (ZOFRAN) injection 4 mg 4 mg Intravenous Given   08/25/2022 1802 ondansetron (ZOFRAN) injection 4 mg 4 mg Intravenous Given        Past Medical History:   Diagnosis Date    Abnormal liver function     Anemia     Anxiety     Arthritis     Chronic kidney disease     stage 3    Chronic narcotic dependence (HCC)     Chronic pain disorder     lower back, hands , neck and knees    Coronary artery disease     Depression     Diabetes mellitus ([facility] )     Elevated troponin 2/11/2022    GERD (gastroesophageal reflux disease)     no meds at present    Heart murmur     murmur    Hyperlipidemia     Hypertension     Neurogenic bladder     Open toe wound 12/2020    right big toe open calus but is dry at present    Renal disorder     Shortness of breath     exertion    Sleep apnea     doesn't use cpap    Suprapubic catheter ([facility] )      Present on Admission:   Chronic combined systolic and diastolic congestive heart failure (HCC)   Major depressive disorder   CAD (coronary artery disease)   HTN (hypertension)   ESRD (end stage renal disease) ([facility] )   Neurogenic bladder   Acquired hypothyroidism   Gastroesophageal reflux disease without esophagitis   Mixed hyperlipidemia   Continuous opioid dependence ([facility] )      Admitting Diagnosis: Diabetes ([facility] ) [E11 9]  Nausea [R11 0]  Elevated troponin [R77 8]  End stage renal disease on dialysis ([facility] ) [N18 6, Z99 2]  Multiple vessel coronary artery disease [I25 10]  Age/Sex: 63 y o  female  Admission Orders:  Scheduled Medications:  allopurinol, 300 mg, Oral, Daily  aspirin, 81 mg, Oral, Daily  atorvastatin, 40 mg, Oral, After Dinner  bisoprolol, 2 5 mg, Oral, Daily  citalopram, 40 mg, Oral, Daily  clopidogrel, 75 mg, Oral, Daily  gabapentin, 100 mg, Oral, BID  heparin (porcine), 5,000 Units, Subcutaneous, Q8H LIAM  insulin glargine, 28 Units, Subcutaneous, Q12H LIAM  insulin lispro, 1-5 Units, Subcutaneous, TID AC  isosorbide mononitrate, 30 mg, Oral, QPM  isosorbide mononitrate, 60 mg, Oral, Daily  levothyroxine, 50 mcg, Oral, Early Morning  losartan, 25 mg, Oral, Daily  OLANZapine, 5 mg, Oral, HS  pantoprazole, 40 mg, Oral, BID  torsemide, 40 mg, Oral, BID      Continuous IV Infusions:    IV NSS @ 100 ml/hr     PRN Meds:  ondansetron, 4 mg, Intravenous, Q6H PRN  oxyCODONE, 5 mg, Oral, Q8H PRN -x 1 8/26  tiZANidine, 4 mg, Oral, Q8H PRN    Tele  POC GLUCOSE AC/HS WITH SSI COVERAGE   NPO  IP CONSULT TO NEPHROLOGY  IP CONSULT TO CARDIOLOGY    Network Utilization Review Department  ATTENTION: Please call with any questions or concerns to [phone] and carefully listen to the prompts so that you are directed to the right person  All voicemails are confidential   Berto Hernandez all requests for admission clinical reviews, approved or denied determinations and any other requests to dedicated fax number below belonging to the campus where the patient is receiving treatment   List of dedicated fax numbers for the Facilities:  [address] DENIALS (Administrative/Medical Necessity) [phone]   [address] (Maternity/NICU/Pediatrics) [phone]   [address]  [address] [facility] [place] Jodiida Srinivas [place] [address] [address] [place] [address] [address] Bri Godinez [address] [phone]

## 2022-08-29 ENCOUNTER — PATIENT OUTREACH (OUTPATIENT)
Dept: FAMILY MEDICINE CLINIC | Facility: CLINIC | Age: 62
End: 2022-08-29

## 2022-08-29 NOTE — TELEPHONE ENCOUNTER
Reached out to patient to schedule a consult, patient stated she is moving to [facility] in about to weeks, did not want to schedule an appointment

## 2022-08-29 NOTE — UTILIZATION REVIEW
Karson Ordoñez, RN   Registered Nurse   Specialty:  Medical Surgical   Utilization Review       Addendum   Date of Service:  8/26/2022  9:18 AM                 Expand All Collapse All        Show:Clear all  [x]Manual[x]Template[x]Copied    Added by:  [x]Toya Simon RN      []Dinorah for details    Initial Clinical Review     Admission: Date/Time/Statement:       Admission Orders (From admission, onward)              Ordered          08/25/22 1814   INPATIENT ADMISSION  Once                                Orders Placed This Encounter   Procedures    INPATIENT ADMISSION       Standing Status:   Standing       Number of Occurrences:   1       Order Specific Question:   Level of Care       Answer:   Med Surg [16]       Order Specific Question:   Estimated length of stay       Answer:   More than 2 Midnights       Order Specific Question:   Certification       Answer:   I certify that inpatient services are medically necessary for this patient for a duration of greater than two midnights  See H&P and MD Progress Notes for additional information about the patient's course of treatment                ED Arrival Information               Expected   -    Arrival   8/25/2022 15:28    Acuity   Urgent              Means of arrival   Ambulance    Escorted by   [place] ([address] [facility])    Service   Hospitalist    Admission type   Urgent              Arrival complaint   nausea                  Chief Complaint   Patient presents with    Nausea       Patient arrives ems coming from home c/o nausea and dizziness, patient denies vomiting and diarrhea  Patient dialysis M-W-F went yesterday without complications  Pt hx of htn           Initial Presentation: 63 y o  female presents to the ED via EMS from home with c/o nausea w/o vomiting x 1 day  PMH: ESRD on HD MWF, depression, IDDM, HTN, HLD, combined CHF  In the ED she had Elevated lipase, troponins, BUN/Cr, glucose 403, UA + UTI    Imaging shows large fat containing umbilical hernia, neurogenic bladder with perm morales  Treated with IV Zofran x 2  On exam abd nontender, nondistended  She is admitted to INPATIENT status with Nausea - diff dx - gastroenteritis versus GERD versus ACS as possible anginal equivalent - trend troponins, Tele, cardio consult  Continuous opioid dependence - Oxycodone PRN  ESRD on HD - Nephrology consult        Date: 8/26   Day 2:   Remains with elevated BP today  Hgb down to 9 5 BUN/Cr elevating  Pt had 3 hr HD today and is being d/c post tx       8/26 Nephrology Consult - received 3 hr HD today, nausea resolved  Lungs clear  Will resume HD On normal days  Restart Epogen and Venofer with dialysis  Continue home BP meds - Bisoprolol 2 5 mg p o  Q day, Imdur 30 mg p o  Q h s , 60 mg p o  Q a m , losartan 25 mg p o  Q day, torsemide 40 mg p o  B i d        8/26 Cardio Consult -presents with nausea, pt now has good BP control today  Continue Bisoprolol 2 5 mg daily, Isosorbide, BID, Losartan daily and Torsemide BID  ASA, Clopidogrel, Lipitor to continue daily  Pt will be moving to [place] in September and will establish care there    OK for d/c               ED Triage Vitals   Temperature Pulse Respirations Blood Pressure SpO2   08/25/22 1534 08/25/22 1534 08/25/22 1534 08/25/22 1534 08/25/22 1534   97 7 °F (36 5 °C) 88 18 (!) 185/83 96 %       Temp Source Heart Rate Source Patient Position - Orthostatic VS BP Location FiO2 (%)   08/25/22 1534 08/25/22 1534 08/25/22 1534 08/25/22 1534 --   Oral Monitor Lying Left arm         Pain Score           08/26/22 0136           8                  Wt Readings from Last 1 Encounters:   08/25/22 118 kg (260 lb 5 8 oz)      Additional Vital Signs:   08/26/22 0738 97 6 °F (36 4 °C) 68 18 162/80 -- 98 % -- Lying   08/25/22 2328 97 1 °F (36 2 °C) Abnormal  77 18 172/83 Abnormal  104 98 % None (Room air) Sitting   08/25/22 1958 97 8 °F (36 6 °C) 76 16 181/81 Abnormal  -- -- -- Sitting   08/25/22 1923 -- 78 18 179/82 Abnormal  -- 97 % None (Room air) Lying   08/25/22 1745 -- 70 13 189/81 Abnormal  116 95 % None (Room air) Sitting      Pertinent Labs/Diagnostic Test Results:      8/25 ECG - Normal sinus rhythm  Left bundle branch block  Abnormal ECG     XR chest 1 view portable   Final Result by Arcadio Boast, MD (08/26 0029)       No acute cardiopulmonary disease                        Workstation performed: [id number]           CT abdomen pelvis wo contrast   Final Result by Nena Burkett MD (08/25 1649)           1  No acute inflammatory process in the abdomen or the pelvis     2   Large fat-containing umbilical hernia                Workstation performed: [id number]                   Results from last 7 days   Lab Units 08/25/22  1713   SARS-COV-2   Negative             Results from last 7 days   Lab Units 08/26/22 0453 08/25/22 2214 08/25/22  1713   WBC Thousand/uL 7 60  --  8 91   HEMOGLOBIN g/dL 9 5*  --  11 3*   HEMATOCRIT % 29 4*  --  32 5*   PLATELETS Thousands/uL 191 200 217   NEUTROS ABS Thousands/µL 4 91  --  6 93                Results from last 7 days   Lab Units 08/26/22  0453 08/25/22  1713   SODIUM mmol/L 138 136   POTASSIUM mmol/L 3 6 4 3   CHLORIDE mmol/L 101 98   CO2 mmol/L 31 27   ANION GAP mmol/L 6 11   BUN mg/dL 36* 30*   CREATININE mg/dL 2 61* 2 36*   EGFR ml/min/1 73sq m 19 21   CALCIUM mg/dL 8 4 9 0            Results from last 7 days   Lab Units 08/26/22 0453 08/25/22  1713   AST U/L 13 18   ALT U/L 18 22   ALK PHOS U/L 94 112   TOTAL PROTEIN g/dL 5 9* 7 0   ALBUMIN g/dL 2 7* 3 2*   TOTAL BILIRUBIN mg/dL 0 24 0 48   BILIRUBIN DIRECT mg/dL  --  0 09            Results from last 7 days   Lab Units 08/26/22  0740 08/25/22 2120   POC GLUCOSE mg/dl 358* 412*      Results from last 7 days   Lab Units 08/26/22  0453 08/25/22  1713   GLUCOSE RANDOM mg/dL 381* 403*             Results from last 7 days   Lab Units 08/25/22 2214 08/25/22 1917 08/25/22  1713   HS TNI 0HR ng/L 92*  --  98* HS TNI 2HR ng/L  --  96*  --    HSTNI D2 ng/L  --  -2  --            Results from last 7 days   Lab Units 08/25/22  1713   LIPASE u/L 45*                       Results from last 7 days   Lab Units 08/25/22  1928   CLARITY UA   Cloudy   COLOR UA   Yellow   SPEC GRAV UA   1 015   PH UA   >=9 0*   GLUCOSE UA mg/dl 500 (1/2%)*   KETONES UA mg/dl 15 (1+)*   BLOOD UA   Small*   PROTEIN UA mg/dl >=300*   NITRITE UA   Negative   BILIRUBIN UA   Negative   UROBILINOGEN UA E U /dl 0 2   LEUKOCYTES UA   Trace*   WBC UA /hpf 1-2*   RBC UA /hpf None Seen   BACTERIA UA /hpf Innumerable*   EPITHELIAL CELLS WET PREP /hpf None Seen           Results from last 7 days   Lab Units 08/25/22  1713   INFLUENZA A PCR   Negative   INFLUENZA B PCR   Negative   RSV PCR   Negative      ED Treatment:           Medication Administration from 08/25/2022 1528 to 08/25/2022 1956    Date/Time Order Dose Route Action   08/25/2022 1721 ondansetron (ZOFRAN) injection 4 mg 4 mg Intravenous Given   08/25/2022 1802 ondansetron (ZOFRAN) injection 4 mg 4 mg Intravenous Given          Medical History   Past Medical History:   Diagnosis Date    Abnormal liver function      Anemia      Anxiety      Arthritis      Chronic kidney disease       stage 3    Chronic narcotic dependence (HCC)      Chronic pain disorder       lower back, hands , neck and knees    Coronary artery disease      Depression      Diabetes mellitus (HCC)      Elevated troponin 2/11/2022    GERD (gastroesophageal reflux disease)       no meds at present    Heart murmur       murmur    Hyperlipidemia      Hypertension      Neurogenic bladder      Open toe wound 12/2020     right big toe open calus but is dry at present    Renal disorder      Shortness of breath       exertion    Sleep apnea       doesn't use cpap    Suprapubic catheter (HCC)           Present on Admission:   Chronic combined systolic and diastolic congestive heart failure (HCC)   Major depressive disorder   CAD (coronary artery disease)   HTN (hypertension)   ESRD (end stage renal disease) ([place] [address] )   Neurogenic bladder   Acquired hypothyroidism   Gastroesophageal reflux disease without esophagitis   Mixed hyperlipidemia   Continuous opioid dependence (HCC)        Admitting Diagnosis: Diabetes ([place] [address] ) [E11 9]  Nausea [R11 0]  Elevated troponin [R77 8]  End stage renal disease on dialysis ([place] [address] ) [N18 6, Z99 2]  Multiple vessel coronary artery disease [I25 10]  Age/Sex: 63 y o  female  Admission Orders:  Scheduled Medications:  allopurinol, 300 mg, Oral, Daily  aspirin, 81 mg, Oral, Daily  atorvastatin, 40 mg, Oral, After Dinner  bisoprolol, 2 5 mg, Oral, Daily  citalopram, 40 mg, Oral, Daily  clopidogrel, 75 mg, Oral, Daily  gabapentin, 100 mg, Oral, BID  heparin (porcine), 5,000 Units, Subcutaneous, Q8H LIAM  insulin glargine, 28 Units, Subcutaneous, Q12H LIAM  insulin lispro, 1-5 Units, Subcutaneous, TID AC  isosorbide mononitrate, 30 mg, Oral, QPM  isosorbide mononitrate, 60 mg, Oral, Daily  levothyroxine, 50 mcg, Oral, Early Morning  losartan, 25 mg, Oral, Daily  OLANZapine, 5 mg, Oral, HS  pantoprazole, 40 mg, Oral, BID  torsemide, 40 mg, Oral, BID        Continuous IV Infusions:     IV NSS @ 100 ml/hr  PRN Meds:  ondansetron, 4 mg, Intravenous, Q6H PRN  oxyCODONE, 5 mg, Oral, Q8H PRN -x 1 8/26  tiZANidine, 4 mg, Oral, Q8H PRN     Tele  POC GLUCOSE AC/HS WITH SSI COVERAGE   NPO  IP CONSULT TO NEPHROLOGY  IP CONSULT TO CARDIOLOGY        Notification of Discharge   This is a Notification of Discharge from our facility [address]  Please be advised that this patient has been discharge from our facility  Below you will find the admission and discharge date and time including the patients disposition  UTILIZATION REVIEW CONTACT:  Daniel Mcrae  Utilization   Network Utilization Review Department  Phone: [phone] x carefully listen to the prompts   All voicemails are confidential   Email: [email or web address]     PHYSICIAN ADVISORY SERVICES:  FOR [id number] REVIEW - MEDICAL NECESSITY DENIAL  Phone: [phone]  Fax: [phone]  Email: [email or web address]     PRESENTATION DATE: 8/25/2022  3:28 PM    INPATIENT ADMISSION DATE: 8/25/22  6:14 PM   DISCHARGE DATE: 8/26/2022  4:50 PM  DISPOSITION: Home with [place] with [address] INFORMATION:  Send all requests for admission clinical reviews, approved or denied determinations and any other requests to dedicated fax number below belonging to the campus where the patient is receiving treatment   List of dedicated fax numbers:  [address] DENIALS (Administrative/Medical Necessity) [phone]   [address] (Maternity/NICU/Pediatrics) [phone]   Ken Maharaj [phone]   [address] [facility] [address] [phone]   [address] [place] [address] [phone]   2000 [facility] [address] [place] [address],[address] [phone]   [facility]  [phone]   [address] [facility] [address]  [address] [facility] [address] [facility] [phone]

## 2022-08-29 NOTE — PROGRESS NOTES
I called the patient to follow up after a recent hospital stay for nausea  She states the nausea continues despite taking pantoprazole bid  She notes her last EGD was many years ago  The patient has a history of nausea and I question whether it is related to her medications  The patient informed me she is relocating and her friends will be picking her up this weekend; note sent to clerical to cancel next week's PCP appointment  She is willing to come in the office this week if there is availability  I advised the patient to be sure she has enough meds to cover a few months until she finds another provider; she states her daughter will assist with this  The patient was very grateful and appreciative of my help over the past few years  Chart reviewed

## 2022-08-30 DIAGNOSIS — F51.05 INSOMNIA SECONDARY TO ANXIETY: ICD-10-CM

## 2022-08-30 DIAGNOSIS — K21.9 GASTROESOPHAGEAL REFLUX DISEASE WITHOUT ESOPHAGITIS: ICD-10-CM

## 2022-08-30 DIAGNOSIS — I10 HYPERTENSION, UNSPECIFIED TYPE: ICD-10-CM

## 2022-08-30 DIAGNOSIS — Z98.1 S/P CERVICAL SPINAL FUSION: ICD-10-CM

## 2022-08-30 DIAGNOSIS — F41.9 INSOMNIA SECONDARY TO ANXIETY: ICD-10-CM

## 2022-08-30 DIAGNOSIS — D64.9 NORMOCHROMIC NORMOCYTIC ANEMIA: ICD-10-CM

## 2022-08-30 RX ORDER — PANTOPRAZOLE SODIUM 40 MG/1
TABLET, DELAYED RELEASE ORAL
Qty: 180 TABLET | Refills: 1 | Status: SHIPPED | OUTPATIENT
Start: 2022-08-30

## 2022-08-30 NOTE — TELEPHONE ENCOUNTER
FAXED ON 08/30/22 TO [address] [facility]  at [phone]  FAX CONFIRMATION RECEIVED   SCANNED IN CHART

## 2022-08-31 RX ORDER — LOSARTAN POTASSIUM 25 MG/1
25 TABLET ORAL DAILY
Qty: 30 TABLET | Refills: 0 | Status: SHIPPED | OUTPATIENT
Start: 2022-08-31 | End: 2022-09-01 | Stop reason: SDUPTHER

## 2022-08-31 RX ORDER — GABAPENTIN 100 MG/1
CAPSULE ORAL
Qty: 60 CAPSULE | Refills: 0 | Status: SHIPPED | OUTPATIENT
Start: 2022-08-31 | End: 2022-09-01 | Stop reason: SDUPTHER

## 2022-08-31 RX ORDER — FERROUS SULFATE 325(65) MG
TABLET ORAL
Qty: 45 TABLET | Refills: 0 | Status: SHIPPED | OUTPATIENT
Start: 2022-08-31 | End: 2022-09-01 | Stop reason: SDUPTHER

## 2022-08-31 RX ORDER — OLANZAPINE 5 MG/1
TABLET ORAL
Qty: 90 TABLET | Refills: 0 | Status: SHIPPED | OUTPATIENT
Start: 2022-08-31 | End: 2022-09-01 | Stop reason: SDUPTHER

## 2022-09-01 ENCOUNTER — OFFICE VISIT (OUTPATIENT)
Dept: FAMILY MEDICINE CLINIC | Facility: CLINIC | Age: 62
End: 2022-09-01

## 2022-09-01 VITALS
DIASTOLIC BLOOD PRESSURE: 70 MMHG | RESPIRATION RATE: 20 BRPM | HEIGHT: 68 IN | HEART RATE: 67 BPM | SYSTOLIC BLOOD PRESSURE: 150 MMHG | WEIGHT: 258.3 LBS | OXYGEN SATURATION: 97 % | BODY MASS INDEX: 39.15 KG/M2 | TEMPERATURE: 97.2 F

## 2022-09-01 DIAGNOSIS — E11.22 TYPE 2 DIABETES MELLITUS WITH STAGE 5 CHRONIC KIDNEY DISEASE NOT ON CHRONIC DIALYSIS, WITH LONG-TERM CURRENT USE OF INSULIN (HCC): Primary | ICD-10-CM

## 2022-09-01 DIAGNOSIS — R19.7 NAUSEA VOMITING AND DIARRHEA: ICD-10-CM

## 2022-09-01 DIAGNOSIS — E03.9 HYPOTHYROIDISM, UNSPECIFIED TYPE: ICD-10-CM

## 2022-09-01 DIAGNOSIS — R11.2 NAUSEA VOMITING AND DIARRHEA: ICD-10-CM

## 2022-09-01 DIAGNOSIS — K31.84 GASTROPARESIS: ICD-10-CM

## 2022-09-01 DIAGNOSIS — M10.9 GOUT, UNSPECIFIED CAUSE, UNSPECIFIED CHRONICITY, UNSPECIFIED SITE: ICD-10-CM

## 2022-09-01 DIAGNOSIS — Z79.899 HIGH RISK MEDICATION USE: ICD-10-CM

## 2022-09-01 DIAGNOSIS — N18.32 STAGE 3B CHRONIC KIDNEY DISEASE (HCC): ICD-10-CM

## 2022-09-01 DIAGNOSIS — Z79.4 CURRENT USE OF INSULIN (HCC): ICD-10-CM

## 2022-09-01 DIAGNOSIS — I10 HYPERTENSION, UNSPECIFIED TYPE: ICD-10-CM

## 2022-09-01 DIAGNOSIS — F51.05 INSOMNIA SECONDARY TO ANXIETY: ICD-10-CM

## 2022-09-01 DIAGNOSIS — I25.10 CORONARY ARTERY DISEASE INVOLVING NATIVE HEART WITHOUT ANGINA PECTORIS, UNSPECIFIED VESSEL OR LESION TYPE: ICD-10-CM

## 2022-09-01 DIAGNOSIS — L30.4 INTERTRIGO: ICD-10-CM

## 2022-09-01 DIAGNOSIS — Z79.4 TYPE 2 DIABETES MELLITUS WITH STAGE 5 CHRONIC KIDNEY DISEASE NOT ON CHRONIC DIALYSIS, WITH LONG-TERM CURRENT USE OF INSULIN (HCC): Primary | ICD-10-CM

## 2022-09-01 DIAGNOSIS — I20.8 STABLE ANGINA (HCC): ICD-10-CM

## 2022-09-01 DIAGNOSIS — Z79.4 TYPE 2 DIABETES MELLITUS WITH FOOT ULCER, WITH LONG-TERM CURRENT USE OF INSULIN (HCC): ICD-10-CM

## 2022-09-01 DIAGNOSIS — N18.5 TYPE 2 DIABETES MELLITUS WITH STAGE 5 CHRONIC KIDNEY DISEASE NOT ON CHRONIC DIALYSIS, WITH LONG-TERM CURRENT USE OF INSULIN (HCC): Primary | ICD-10-CM

## 2022-09-01 DIAGNOSIS — E11.42 TYPE 2 DIABETES MELLITUS WITH DIABETIC POLYNEUROPATHY, WITH LONG-TERM CURRENT USE OF INSULIN (HCC): ICD-10-CM

## 2022-09-01 DIAGNOSIS — Z98.1 S/P CERVICAL SPINAL FUSION: ICD-10-CM

## 2022-09-01 DIAGNOSIS — F11.20 CONTINUOUS OPIOID DEPENDENCE (HCC): ICD-10-CM

## 2022-09-01 DIAGNOSIS — K59.00 CONSTIPATION, UNSPECIFIED CONSTIPATION TYPE: ICD-10-CM

## 2022-09-01 DIAGNOSIS — F41.9 INSOMNIA SECONDARY TO ANXIETY: ICD-10-CM

## 2022-09-01 DIAGNOSIS — Z79.4 TYPE 2 DIABETES MELLITUS WITH DIABETIC POLYNEUROPATHY, WITH LONG-TERM CURRENT USE OF INSULIN (HCC): ICD-10-CM

## 2022-09-01 DIAGNOSIS — L97.509 TYPE 2 DIABETES MELLITUS WITH FOOT ULCER, WITH LONG-TERM CURRENT USE OF INSULIN (HCC): ICD-10-CM

## 2022-09-01 DIAGNOSIS — M17.12 PRIMARY OSTEOARTHRITIS OF LEFT KNEE: ICD-10-CM

## 2022-09-01 DIAGNOSIS — E11.621 TYPE 2 DIABETES MELLITUS WITH FOOT ULCER, WITH LONG-TERM CURRENT USE OF INSULIN (HCC): ICD-10-CM

## 2022-09-01 DIAGNOSIS — D64.9 NORMOCHROMIC NORMOCYTIC ANEMIA: ICD-10-CM

## 2022-09-01 DIAGNOSIS — G89.4 CHRONIC PAIN SYNDROME: ICD-10-CM

## 2022-09-01 LAB — SL AMB POCT GLUCOSE BLD: 132

## 2022-09-01 PROCEDURE — 3077F SYST BP >= 140 MM HG: CPT | Performed by: NURSE PRACTITIONER

## 2022-09-01 PROCEDURE — 82948 REAGENT STRIP/BLOOD GLUCOSE: CPT | Performed by: NURSE PRACTITIONER

## 2022-09-01 PROCEDURE — 80361 OPIATES 1 OR MORE: CPT | Performed by: NURSE PRACTITIONER

## 2022-09-01 PROCEDURE — 80307 DRUG TEST PRSMV CHEM ANLYZR: CPT | Performed by: NURSE PRACTITIONER

## 2022-09-01 PROCEDURE — 3066F NEPHROPATHY DOC TX: CPT | Performed by: NURSE PRACTITIONER

## 2022-09-01 PROCEDURE — 80365 DRUG SCREENING OXYCODONE: CPT | Performed by: NURSE PRACTITIONER

## 2022-09-01 PROCEDURE — 3078F DIAST BP <80 MM HG: CPT | Performed by: NURSE PRACTITIONER

## 2022-09-01 PROCEDURE — 3066F NEPHROPATHY DOC TX: CPT

## 2022-09-01 PROCEDURE — 99215 OFFICE O/P EST HI 40 MIN: CPT | Performed by: NURSE PRACTITIONER

## 2022-09-01 RX ORDER — ISOSORBIDE MONONITRATE 60 MG/1
60 TABLET, EXTENDED RELEASE ORAL DAILY
Qty: 90 TABLET | Refills: 2 | Status: SHIPPED | OUTPATIENT
Start: 2022-09-01

## 2022-09-01 RX ORDER — LANCETS 33 GAUGE
EACH MISCELLANEOUS
Qty: 100 EACH | Refills: 2 | Status: SHIPPED | OUTPATIENT
Start: 2022-09-01

## 2022-09-01 RX ORDER — ALLOPURINOL 300 MG/1
300 TABLET ORAL DAILY
Qty: 90 TABLET | Refills: 1 | Status: SHIPPED | OUTPATIENT
Start: 2022-09-01

## 2022-09-01 RX ORDER — ATORVASTATIN CALCIUM 40 MG/1
40 TABLET, FILM COATED ORAL DAILY
Qty: 90 TABLET | Refills: 1 | Status: SHIPPED | OUTPATIENT
Start: 2022-09-01

## 2022-09-01 RX ORDER — ONDANSETRON 4 MG/1
4 TABLET, ORALLY DISINTEGRATING ORAL EVERY 8 HOURS PRN
Qty: 20 TABLET | Refills: 0 | Status: SHIPPED | OUTPATIENT
Start: 2022-09-01

## 2022-09-01 RX ORDER — LEVOTHYROXINE SODIUM 0.05 MG/1
50 TABLET ORAL DAILY
Qty: 90 TABLET | Refills: 0 | Status: SHIPPED | OUTPATIENT
Start: 2022-09-01

## 2022-09-01 RX ORDER — FERROUS SULFATE 325(65) MG
1 TABLET ORAL EVERY OTHER DAY
Qty: 45 TABLET | Refills: 0 | Status: SHIPPED | OUTPATIENT
Start: 2022-09-01

## 2022-09-01 RX ORDER — NITROGLYCERIN 0.4 MG/1
0.4 TABLET SUBLINGUAL
Qty: 25 TABLET | Refills: 1 | Status: SHIPPED | OUTPATIENT
Start: 2022-09-01

## 2022-09-01 RX ORDER — ISOSORBIDE MONONITRATE 30 MG/1
30 TABLET, EXTENDED RELEASE ORAL EVERY EVENING
Qty: 30 TABLET | Refills: 0 | Status: SHIPPED | OUTPATIENT
Start: 2022-09-01 | End: 2022-09-30

## 2022-09-01 RX ORDER — ASPIRIN 81 MG/1
81 TABLET ORAL DAILY
Qty: 90 TABLET | Refills: 1 | Status: SHIPPED | OUTPATIENT
Start: 2022-09-01

## 2022-09-01 RX ORDER — LOSARTAN POTASSIUM 25 MG/1
25 TABLET ORAL DAILY
Qty: 30 TABLET | Refills: 0 | Status: SHIPPED | OUTPATIENT
Start: 2022-09-01

## 2022-09-01 RX ORDER — TIZANIDINE 4 MG/1
4 TABLET ORAL EVERY 8 HOURS PRN
Qty: 270 TABLET | Refills: 1 | Status: SHIPPED | OUTPATIENT
Start: 2022-09-01

## 2022-09-01 RX ORDER — OLANZAPINE 5 MG/1
5 TABLET ORAL
Qty: 90 TABLET | Refills: 0 | Status: SHIPPED | OUTPATIENT
Start: 2022-09-01

## 2022-09-01 RX ORDER — CLOPIDOGREL BISULFATE 75 MG/1
75 TABLET ORAL DAILY
Qty: 90 TABLET | Refills: 1 | Status: SHIPPED | OUTPATIENT
Start: 2022-09-01

## 2022-09-01 RX ORDER — SIMETHICONE 180 MG
180 CAPSULE ORAL EVERY 8 HOURS
Qty: 40 CAPSULE | Refills: 0 | Status: SHIPPED | OUTPATIENT
Start: 2022-09-01

## 2022-09-01 RX ORDER — CITALOPRAM 40 MG/1
40 TABLET ORAL DAILY
Qty: 90 TABLET | Refills: 1 | Status: SHIPPED | OUTPATIENT
Start: 2022-09-01

## 2022-09-01 RX ORDER — GABAPENTIN 100 MG/1
100 CAPSULE ORAL 2 TIMES DAILY
Qty: 60 CAPSULE | Refills: 1 | Status: SHIPPED | OUTPATIENT
Start: 2022-09-01

## 2022-09-01 RX ORDER — NYSTATIN 100000 [USP'U]/G
POWDER TOPICAL 2 TIMES DAILY
Qty: 30 G | Refills: 1 | Status: SHIPPED | OUTPATIENT
Start: 2022-09-01

## 2022-09-01 RX ORDER — BISOPROLOL FUMARATE 5 MG/1
2.5 TABLET, FILM COATED ORAL DAILY
Qty: 90 TABLET | Refills: 0 | Status: SHIPPED | OUTPATIENT
Start: 2022-09-01

## 2022-09-01 RX ORDER — PEN NEEDLE, DIABETIC 32 GX 1/4"
NEEDLE, DISPOSABLE MISCELLANEOUS
Qty: 200 EACH | Refills: 5 | Status: SHIPPED | OUTPATIENT
Start: 2022-09-01

## 2022-09-01 RX ORDER — OXYCODONE HYDROCHLORIDE 5 MG/1
5 TABLET ORAL EVERY 8 HOURS PRN
Qty: 90 TABLET | Refills: 0 | Status: SHIPPED | OUTPATIENT
Start: 2022-09-01

## 2022-09-01 RX ORDER — INSULIN LISPRO 100 [IU]/ML
10 INJECTION, SOLUTION INTRAVENOUS; SUBCUTANEOUS
Qty: 15 ML | Refills: 0 | Status: SHIPPED | OUTPATIENT
Start: 2022-09-01

## 2022-09-01 RX ORDER — INSULIN GLARGINE 100 [IU]/ML
25 INJECTION, SOLUTION SUBCUTANEOUS EVERY 12 HOURS SCHEDULED
Qty: 30 ML | Refills: 0 | Status: SHIPPED | OUTPATIENT
Start: 2022-09-01

## 2022-09-01 RX ORDER — DULAGLUTIDE 1.5 MG/.5ML
0.5 INJECTION, SOLUTION SUBCUTANEOUS WEEKLY
Qty: 6 ML | Refills: 1 | Status: SHIPPED | OUTPATIENT
Start: 2022-09-01

## 2022-09-01 RX ORDER — NALOXONE HYDROCHLORIDE 4 MG/.1ML
SPRAY NASAL
Qty: 1 EACH | Refills: 1 | Status: SHIPPED | OUTPATIENT
Start: 2022-09-01

## 2022-09-01 NOTE — PROGRESS NOTES
Assessment/Plan:    Macarena Bowers was seen today for diabetes  Diagnoses and all orders for this visit:    Type 2 diabetes mellitus with stage 5 chronic kidney disease not on chronic dialysis, with long-term current use of insulin ([facility])  -     POCT blood glucose    Gout, unspecified cause, unspecified chronicity, unspecified site  -     allopurinol (ZYLOPRIM) 300 mg tablet; Take 1 tablet (300 mg total) by mouth daily    Type 2 diabetes mellitus with diabetic polyneuropathy, with long-term current use of insulin ([facility])  -     aspirin (ECOTRIN LOW STRENGTH) 81 mg EC tablet; Take 1 tablet (81 mg total) by mouth daily  -     atorvastatin (LIPITOR) 40 mg tablet; Take 1 tablet (40 mg total) by mouth daily  -     citalopram (CeleXA) 40 mg tablet; Take 1 tablet (40 mg total) by mouth daily  -     clopidogrel (PLAVIX) 75 mg tablet; Take 1 tablet (75 mg total) by mouth daily  -     Dulaglutide (Trulicity) 1 5 MJ/4 8WS SOPN; Inject 0 5 mL (1 5 mg total) under the skin once a week  -     insulin lispro (HumaLOG) 100 units/mL injection pen; Inject 10 Units under the skin 3 (three) times a day with meals  -     Lancets (OneTouch Delica Plus [id number]) MISC; USE TO TEST 3 TIMES DAILY    Coronary artery disease involving native heart without angina pectoris, unspecified vessel or lesion type  -     aspirin (ECOTRIN LOW STRENGTH) 81 mg EC tablet; Take 1 tablet (81 mg total) by mouth daily  -     atorvastatin (LIPITOR) 40 mg tablet; Take 1 tablet (40 mg total) by mouth daily  -     citalopram (CeleXA) 40 mg tablet; Take 1 tablet (40 mg total) by mouth daily  -     clopidogrel (PLAVIX) 75 mg tablet; Take 1 tablet (75 mg total) by mouth daily  -     isosorbide mononitrate (IMDUR) 60 mg 24 hr tablet;  Take 1 tablet (60 mg total) by mouth daily  -     Lancets (OneTouch Delica Plus [id number]) MISC; USE TO TEST 3 TIMES DAILY  -     nitroglycerin (NITROSTAT) 0 4 mg SL tablet; Place 1 tablet (0 4 mg total) under the tongue every 5 (five) minutes as needed for chest pain    Stage 3b chronic kidney disease (HCC)  -     aspirin (ECOTRIN LOW STRENGTH) 81 mg EC tablet; Take 1 tablet (81 mg total) by mouth daily  -     atorvastatin (LIPITOR) 40 mg tablet; Take 1 tablet (40 mg total) by mouth daily  -     citalopram (CeleXA) 40 mg tablet; Take 1 tablet (40 mg total) by mouth daily  -     clopidogrel (PLAVIX) 75 mg tablet; Take 1 tablet (75 mg total) by mouth daily  -     Lancets (OneTouch Delica Plus [id number]) MISC; USE TO TEST 3 TIMES DAILY    Current use of insulin (formerly [facility])  -     aspirin (ECOTRIN LOW STRENGTH) 81 mg EC tablet; Take 1 tablet (81 mg total) by mouth daily  -     atorvastatin (LIPITOR) 40 mg tablet; Take 1 tablet (40 mg total) by mouth daily  -     citalopram (CeleXA) 40 mg tablet; Take 1 tablet (40 mg total) by mouth daily  -     clopidogrel (PLAVIX) 75 mg tablet; Take 1 tablet (75 mg total) by mouth daily  -     Lancets (OneTouch Delica Plus [id number]) MISC; USE TO TEST 3 TIMES DAILY    Hypertension, unspecified type  -     bisoprolol (ZEBETA) 5 mg tablet; Take 0 5 tablets (2 5 mg total) by mouth daily  -     isosorbide mononitrate (IMDUR) 30 mg 24 hr tablet; Take 1 tablet (30 mg total) by mouth every evening  -     losartan (COZAAR) 25 mg tablet; Take 1 tablet (25 mg total) by mouth daily  -     Torsemide 40 MG TABS; Take 80 mg by mouth daily    Constipation, unspecified constipation type  -     docusate sodium (COLACE) 50 mg capsule; Take 1 capsule (50 mg total) by mouth 2 (two) times a day    Normochromic normocytic anemia  -     ferrous sulfate 325 (65 Fe) mg tablet; Take 1 tablet (325 mg total) by mouth every other day    S/P cervical spinal fusion  -     gabapentin (NEURONTIN) 100 mg capsule; Take 1 capsule (100 mg total) by mouth 2 (two) times a day  -     tiZANidine (ZANAFLEX) 4 mg tablet;  Take 1 tablet (4 mg total) by mouth every 8 (eight) hours as needed for muscle spasms    Type 2 diabetes mellitus with foot ulcer, with long-term current use of insulin (HCC)  -     Insulin Glargine Solostar (Lantus SoloStar) 100 UNIT/ML SOPN; Inject 0 25 mL (25 Units total) under the skin every 12 (twelve) hours  -     Insulin Pen Needle (BD Pen Needle Micro U/F) 32G X 6 MM MISC; Use 5 (five) times a day    Stable angina ([facility])  -     isosorbide mononitrate (IMDUR) 60 mg 24 hr tablet; Take 1 tablet (60 mg total) by mouth daily    Hypothyroidism, unspecified type  -     levothyroxine 50 mcg tablet; Take 1 tablet (50 mcg total) by mouth daily    Primary osteoarthritis of left knee  -     naloxone (NARCAN) 4 mg/0 1 mL nasal spray; Administer 1 spray into a nostril  If breathing does not return to normal or if breathing difficulty resumes after 2-3 minutes, give another dose in the other nostril using a new spray  Intertrigo  -     nystatin (MYCOSTATIN) powder; Apply topically 2 (two) times a day    Insomnia secondary to anxiety  -     OLANZapine (ZyPREXA) 5 mg tablet; Take 1 tablet (5 mg total) by mouth daily at bedtime    Continuous opioid dependence ([facility] )  -     Toxicology screen, urine (Clinic collect)  -     Oxycodone/Oxymorphone urine (Clinic collect)  -     Hydrocodone and Metabolites, Urine (Clinic collect)  -     oxyCODONE (ROXICODONE) 5 immediate release tablet; Take 1 tablet (5 mg total) by mouth every 8 (eight) hours as needed for moderate pain Max Daily Amount: 15 mg    Chronic pain syndrome  -     Toxicology screen, urine (Clinic collect)  -     Oxycodone/Oxymorphone urine (Clinic collect)  -     Hydrocodone and Metabolites, Urine (Clinic collect)    High risk medication use  -     Toxicology screen, urine (Clinic collect)  -     Oxycodone/Oxymorphone urine (Clinic collect)  -     Hydrocodone and Metabolites, Urine (Clinic collect)    Nausea vomiting and diarrhea  -     ondansetron (ZOFRAN-ODT) 4 mg disintegrating tablet;  Take 1 tablet (4 mg total) by mouth every 8 (eight) hours as needed for nausea or vomiting    Gastroparesis  -     simethicone (MYLICON,GAS-X) 600 MG capsule; Take 1 capsule (180 mg total) by mouth every 8 (eight) hours  -     ondansetron (ZOFRAN-ODT) 4 mg disintegrating tablet; Take 1 tablet (4 mg total) by mouth every 8 (eight) hours as needed for nausea or vomiting             Subjective:     Jazmyn Obrien is a 63 y o  female who  has a past medical history of Abnormal liver function, Anemia, Anxiety, Arthritis, Chronic kidney disease, Chronic narcotic dependence ([facility] ), Chronic pain disorder, Coronary artery disease, Depression, Diabetes mellitus ([facility] ), Elevated troponin, GERD (gastroesophageal reflux disease), Heart murmur, Hyperlipidemia, Hypertension, Neurogenic bladder, Open toe wound, Renal disorder, Shortness of breath, Sleep apnea, and Suprapubic catheter ([facility] )  who presented to the office today for follow up  She reports nausea  No v/d, fever, dizziness, shortness of breath or chest pain  Recently admitted for same with benign workup  Reports that she is moving to [place] this weekend      The following portions of the patient's history were reviewed and updated as appropriate: allergies, current medications, past family history, past medical history, past social history, past surgical history and problem list     Current Outpatient Medications on File Prior to Visit   Medication Sig Dispense Refill    Insulin Syringe-Needle U-100 (BD Veo Insulin Syringe U/F) 31G X 15/64" 0 5 ML MISC Inject under the skin 3 (three) times a day 100 each 2    OneTouch Ultra test strip USE 1 EACH DAILY USE AS INSTRUCTED 100 strip 2    pantoprazole (PROTONIX) 40 mg tablet TAKE 1 TABLET BY MOUTH TWICE A  tablet 1    [DISCONTINUED] allopurinol (ZYLOPRIM) 300 mg tablet Take 1 tablet (300 mg total) by mouth daily 90 tablet 1    [DISCONTINUED] aspirin (ECOTRIN LOW STRENGTH) 81 mg EC tablet Take 1 tablet (81 mg total) by mouth daily 90 tablet 1    [DISCONTINUED] atorvastatin (LIPITOR) 40 mg tablet Take 1 tablet (40 mg total) by mouth daily 90 tablet 1    [DISCONTINUED] bisoprolol (ZEBETA) 5 mg tablet Take 0 5 tablets (2 5 mg total) by mouth daily 90 tablet 0    [DISCONTINUED] citalopram (CeleXA) 40 mg tablet Take 1 tablet (40 mg total) by mouth daily 90 tablet 1    [DISCONTINUED] clopidogrel (PLAVIX) 75 mg tablet Take 1 tablet (75 mg total) by mouth daily 90 tablet 1    [DISCONTINUED] docusate sodium (COLACE) 50 mg capsule Take 1 capsule (50 mg total) by mouth 2 (two) times a day 180 capsule 0    [DISCONTINUED] Dulaglutide (Trulicity) 1 5 UG/4 6DN SOPN Inject 0 5 mL (1 5 mg total) under the skin once a week 6 mL 1    [DISCONTINUED] ferrous sulfate 325 (65 Fe) mg tablet TAKE 1 TABLET BY MOUTH EVERY OTHER DAY 45 tablet 0    [DISCONTINUED] gabapentin (NEURONTIN) 100 mg capsule TAKE 1 CAPSULE BY MOUTH TWICE A DAY 60 capsule 0    [DISCONTINUED] insulin glargine (Lantus SoloStar) 100 units/mL injection pen Inject 25 Units under the skin every 12 (twelve) hours 30 mL 0    [DISCONTINUED] insulin lispro (HumaLOG) 100 units/mL injection pen Inject 10 Units under the skin 3 (three) times a day with meals 15 mL 0    [DISCONTINUED] Insulin Pen Needle (BD Pen Needle Micro U/F) 32G X 6 MM MISC Use 5 (five) times a day 200 each 5    [DISCONTINUED] isosorbide mononitrate (IMDUR) 30 mg 24 hr tablet Take 1 tablet (30 mg total) by mouth every evening 30 tablet 0    [DISCONTINUED] isosorbide mononitrate (IMDUR) 60 mg 24 hr tablet TAKE 1 TABLET BY MOUTH EVERY DAY 90 tablet 2    [DISCONTINUED] Lancets (OneTouch Delica Plus [id number]) MISC USE TO TEST 3 TIMES DAILY 100 each 2    [DISCONTINUED] levothyroxine 50 mcg tablet TAKE 1 TABLET BY MOUTH EVERY DAY 60 tablet 0    [DISCONTINUED] losartan (COZAAR) 25 mg tablet TAKE 1 TABLET (25 MG TOTAL) BY MOUTH DAILY  30 tablet 0    [DISCONTINUED] naloxone (NARCAN) 4 mg/0 1 mL nasal spray Administer 1 spray into a nostril   If breathing does not return to normal or if breathing difficulty resumes after 2-3 minutes, give another dose in the other nostril using a new spray  1 each 1    [DISCONTINUED] nitroglycerin (NITROSTAT) 0 4 mg SL tablet Place 1 tablet (0 4 mg total) under the tongue every 5 (five) minutes as needed for chest pain 25 tablet 1    [DISCONTINUED] nystatin (MYCOSTATIN) powder Apply topically 2 (two) times a day 30 g 1    [DISCONTINUED] OLANZapine (ZyPREXA) 5 mg tablet TAKE 1 TABLET BY MOUTH AT BEDTIME 90 tablet 0    [DISCONTINUED] ondansetron (ZOFRAN-ODT) 4 mg disintegrating tablet Take 1 tablet (4 mg total) by mouth every 8 (eight) hours as needed for nausea or vomiting 20 tablet 0    [DISCONTINUED] oxyCODONE (ROXICODONE) 5 immediate release tablet Take 1 tablet (5 mg total) by mouth every 8 (eight) hours as needed for moderate pain Max Daily Amount: 15 mg 50 tablet 0    [DISCONTINUED] oxygen gas Inhale 2 L/min continuous  Indications: Respiratory Failure      [DISCONTINUED] tiZANidine (ZANAFLEX) 4 mg tablet TAKE 1 TABLET (4 MG TOTAL) BY MOUTH EVERY 8 (EIGHT) HOURS AS NEEDED FOR MUSCLE SPASMS 270 tablet 1    [DISCONTINUED] torsemide 40 MG TABS Take 80 mg by mouth daily 30 tablet 0     No current facility-administered medications on file prior to visit  Review of Systems   Constitutional: Negative for chills and fever  HENT: Negative for ear pain and sore throat  Eyes: Negative for pain and visual disturbance  Respiratory: Negative for cough and shortness of breath  Cardiovascular: Negative for chest pain and palpitations  Gastrointestinal: Positive for nausea  Negative for abdominal pain, anal bleeding, blood in stool, constipation and vomiting  Genitourinary: Negative for difficulty urinating, dysuria and hematuria  Musculoskeletal: Negative for arthralgias and back pain  Skin: Negative for color change and rash  Neurological: Negative for seizures and syncope  All other systems reviewed and are negative              Objective:    /70 (BP Location: Left arm, Patient Position: Sitting, Cuff Size: Adult)   Pulse 67   Temp (!) 97 2 °F (36 2 °C) (Temporal)   Resp 20   Ht 5' 8" (1 727 m)   Wt 117 kg (258 lb 4 8 oz)   SpO2 97%   BMI 39 27 kg/m²     Physical Exam  Vitals and nursing note reviewed  Constitutional:       Appearance: She is ill-appearing  HENT:      Right Ear: External ear normal       Left Ear: External ear normal    Eyes:      General:         Right eye: No discharge  Left eye: No discharge  Cardiovascular:      Rate and Rhythm: Normal rate and regular rhythm  Pulmonary:      Effort: Pulmonary effort is normal  No respiratory distress  Abdominal:      General: Bowel sounds are normal  There is no distension  Tenderness: There is no abdominal tenderness  Genitourinary:     Comments: +suprapubic catheter  Musculoskeletal:      Right lower leg: Edema present  Left lower leg: Edema present  Neurological:      Mental Status: She is alert and oriented to person, place, and time     Psychiatric:         Behavior: Behavior normal          CHERELLE Dumont  09/01/22  4:56 PM

## 2022-09-02 ENCOUNTER — CLINICAL SUPPORT (OUTPATIENT)
Dept: FAMILY MEDICINE CLINIC | Facility: CLINIC | Age: 62
End: 2022-09-02

## 2022-09-02 DIAGNOSIS — Z11.52 ENCOUNTER FOR SCREENING FOR COVID-19: Primary | ICD-10-CM

## 2022-09-02 PROCEDURE — U0005 INFEC AGEN DETEC AMPLI PROBE: HCPCS | Performed by: NURSE PRACTITIONER

## 2022-09-02 PROCEDURE — U0003 INFECTIOUS AGENT DETECTION BY NUCLEIC ACID (DNA OR RNA); SEVERE ACUTE RESPIRATORY SYNDROME CORONAVIRUS 2 (SARS-COV-2) (CORONAVIRUS DISEASE [COVID-19]), AMPLIFIED PROBE TECHNIQUE, MAKING USE OF HIGH THROUGHPUT TECHNOLOGIES AS DESCRIBED BY CMS-2020-01-R: HCPCS | Performed by: NURSE PRACTITIONER

## 2022-09-03 LAB
AMPHETAMINES UR QL SCN: NEGATIVE NG/ML
BARBITURATES UR QL SCN: NEGATIVE NG/ML
BENZODIAZ UR QL: NEGATIVE NG/ML
BZE UR QL: NEGATIVE NG/ML
CANNABINOIDS UR QL SCN: NEGATIVE NG/ML
METHADONE UR QL SCN: NEGATIVE NG/ML
OPIATES UR QL: NEGATIVE NG/ML
PCP UR QL: NEGATIVE NG/ML
PROPOXYPH UR QL SCN: NEGATIVE NG/ML
SARS-COV-2 RNA RESP QL NAA+PROBE: NEGATIVE

## 2022-09-07 ENCOUNTER — TELEPHONE (OUTPATIENT)
Dept: FAMILY MEDICINE CLINIC | Facility: CLINIC | Age: 62
End: 2022-09-07

## 2022-09-07 NOTE — TELEPHONE ENCOUNTER
PCP Rose Claros VIA FAX AND PLACED IN PCP FOLDER TO BE DELIVERED AT ASSIGNED TIMES      Order# [id number]

## 2022-09-12 LAB
OXYCODONE+OXYMORPHONE SERPLBLD QL SCN: NEGATIVE
OXYCODONE+OXYMORPHONE UR QL SCN: NORMAL NG/ML
OXYCODONE+OXYMORPHONE UR QL SCN: POSITIVE
OXYMORPHONE UR CFM-MCNC: 181 NG/ML
OXYMORPHONE UR QL CFM: POSITIVE

## 2022-09-13 ENCOUNTER — PATIENT OUTREACH (OUTPATIENT)
Dept: FAMILY MEDICINE CLINIC | Facility: CLINIC | Age: 62
End: 2022-09-13

## 2022-09-13 LAB
HYDROCODONE UR QL: NEGATIVE NG/ML
HYDROMORPHONE UR QL: NEGATIVE NG/ML

## 2022-09-27 DIAGNOSIS — I10 HYPERTENSION, UNSPECIFIED TYPE: ICD-10-CM

## 2022-09-27 PROCEDURE — 4010F ACE/ARB THERAPY RXD/TAKEN: CPT

## 2022-09-27 PROCEDURE — 4010F ACE/ARB THERAPY RXD/TAKEN: CPT | Performed by: NURSE PRACTITIONER

## 2022-09-27 RX ORDER — LOSARTAN POTASSIUM 25 MG/1
25 TABLET ORAL DAILY
Qty: 90 TABLET | Refills: 1 | OUTPATIENT
Start: 2022-09-27

## 2022-09-29 DIAGNOSIS — I10 HYPERTENSION, UNSPECIFIED TYPE: ICD-10-CM

## 2022-09-30 RX ORDER — ISOSORBIDE MONONITRATE 30 MG/1
TABLET, EXTENDED RELEASE ORAL
Qty: 30 TABLET | Refills: 0 | Status: SHIPPED | OUTPATIENT
Start: 2022-09-30

## 2022-10-12 PROBLEM — T83.510A URINARY TRACT INFECTION ASSOCIATED WITH CYSTOSTOMY CATHETER (HCC): Status: RESOLVED | Noted: 2021-08-23 | Resolved: 2022-10-12

## 2022-10-12 PROBLEM — N39.0 URINARY TRACT INFECTION ASSOCIATED WITH CYSTOSTOMY CATHETER (HCC): Status: RESOLVED | Noted: 2021-08-23 | Resolved: 2022-10-12

## 2022-11-01 ENCOUNTER — PATIENT OUTREACH (OUTPATIENT)
Dept: FAMILY MEDICINE CLINIC | Facility: CLINIC | Age: 62
End: 2022-11-01

## 2022-11-01 ENCOUNTER — TELEPHONE (OUTPATIENT)
Dept: CARDIOLOGY CLINIC | Facility: CLINIC | Age: 62
End: 2022-11-01

## 2022-11-01 NOTE — TELEPHONE ENCOUNTER
Pt is calling for appt with Dr Dennise Fair she is currently living in [place] but is moving back to [facility] and wants appt with Dr Dennise Fair  She states she had episode which she fell and has been feeling lightheaded at times  Instructed her to go to ED in her present area for eval--she is also a  diaylsis patient  Told her not just wait for appt   what is happening may not just be cardiac related  Pt verbalized understanding

## 2022-11-01 NOTE — PROGRESS NOTES
I received a call from the patient stating she is moving back to [place] this weekend  She was currently at a dialysis session at the time of this call  She is requesting to schedule with her PCP; note sent to clerical    The patient states she has already placed calls to her specialists  Her dialysis center is contacting [phone] to schedule the patient for next week and will most likely be on a MWF schedule  The patient reports 2 episodes of dizziness with one fall  She states her blood pressure has been low  I informed the patient I will follow up with her after she schedules with her PCP

## 2022-11-15 ENCOUNTER — PATIENT OUTREACH (OUTPATIENT)
Dept: FAMILY MEDICINE CLINIC | Facility: CLINIC | Age: 62
End: 2022-11-15

## 2022-11-15 DIAGNOSIS — Z78.9 NEED FOR COMMUNITY RESOURCE: Primary | ICD-10-CM
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
## 2022-11-16 ENCOUNTER — TELEPHONE (OUTPATIENT)
Dept: FAMILY MEDICINE CLINIC | Facility: CLINIC | Age: 62
End: 2022-11-16

## 2022-11-16 NOTE — TELEPHONE ENCOUNTER
11/16/22    Tha Guerra,    Pt called office today and requested to speak to you  Pt stated is in regard of her conversation with KEVIN Meredith and a Referral that was placed  Pt stated that she is requesting to speak to you because she is comfortable and states that you are aware of who she is and has helped her in the past       Please contact Pt

## 2022-11-23 ENCOUNTER — TELEPHONE (OUTPATIENT)
Dept: VASCULAR SURGERY | Facility: CLINIC | Age: 62
End: 2022-11-23

## 2022-11-23 NOTE — TELEPHONE ENCOUNTER
I called the home and patient was currently in dialysis  I have her tentatively scheduled for VM on 12/16/22 @ 2:30 in [facility] and OV 12/28/22 @ 2:30 pm with Dr Christian Coombs in [facility]     From: Moraima Hall <[email or web address]   Sent: Tuesday, November 22, 2022 11:03 AM  To: Racheal Swain <[email or web address]; Katarina Carranza <[email or web address]; Celina Salinas <[email or web address]  Cc: Yahaira Villatoro <[email or web address] [email or web address]; Karime Camargo <[email or web address]; [email or web address]; Samantha Miller <[email or web address] Misha Silva <[email or web address])Rosalyanselmo Martínez <[email or web address]  Subject: Francine Kauffman pt BK needs appts please    WARNING! Based upon the critical issue with [employer] targeting Healthcare systems ALL attachments and links from this email should be heavily scrutinized  [facility],    We have a new CVC pt at [facility], who is ready to start vascular access planning  Nephrologist is Dr Mariano Trotter and HD schedule is T-Th-S 1st shift  She is very motivated and is anticipating a call from your office to schedule vein mapping and surgical consult appts       Pt details:  Akash Carpenter  63year old female  1960     [address] [place] [address]     [phone] (M)    Thank you,  Conor Tuttle

## 2022-11-28 ENCOUNTER — PATIENT OUTREACH (OUTPATIENT)
Dept: FAMILY MEDICINE CLINIC | Facility: CLINIC | Age: 62
End: 2022-11-28

## 2022-11-28 DIAGNOSIS — E03.9 HYPOTHYROIDISM, UNSPECIFIED TYPE: ICD-10-CM

## 2022-11-28 DIAGNOSIS — F41.9 INSOMNIA SECONDARY TO ANXIETY: ICD-10-CM

## 2022-11-28 DIAGNOSIS — F51.05 INSOMNIA SECONDARY TO ANXIETY: ICD-10-CM

## 2022-11-28 RX ORDER — OLANZAPINE 5 MG/1
TABLET ORAL
Qty: 90 TABLET | Refills: 0 | Status: SHIPPED | OUTPATIENT
Start: 2022-11-28

## 2022-11-28 RX ORDER — LEVOTHYROXINE SODIUM 0.05 MG/1
TABLET ORAL
Qty: 90 TABLET | Refills: 0 | Status: SHIPPED | OUTPATIENT
Start: 2022-11-28 | End: 2022-11-30 | Stop reason: SDUPTHER

## 2022-11-28 NOTE — PROGRESS NOTES
I called the patient to remind her of her PCP appointment for tomorrow at [phone]; she plans on attending  I will continue to follow

## 2022-11-29 ENCOUNTER — PATIENT OUTREACH (OUTPATIENT)
Dept: FAMILY MEDICINE CLINIC | Facility: CLINIC | Age: 62
End: 2022-11-29

## 2022-11-29 ENCOUNTER — OFFICE VISIT (OUTPATIENT)
Dept: FAMILY MEDICINE CLINIC | Facility: CLINIC | Age: 62
End: 2022-11-29

## 2022-11-29 VITALS
HEART RATE: 74 BPM | OXYGEN SATURATION: 97 % | RESPIRATION RATE: 19 BRPM | TEMPERATURE: 96 F | SYSTOLIC BLOOD PRESSURE: 106 MMHG | WEIGHT: 246.3 LBS | DIASTOLIC BLOOD PRESSURE: 70 MMHG | HEIGHT: 68 IN | BODY MASS INDEX: 37.33 KG/M2

## 2022-11-29 DIAGNOSIS — L30.4 INTERTRIGO: ICD-10-CM

## 2022-11-29 DIAGNOSIS — M10.9 GOUT, UNSPECIFIED CAUSE, UNSPECIFIED CHRONICITY, UNSPECIFIED SITE: ICD-10-CM

## 2022-11-29 DIAGNOSIS — E11.42 TYPE 2 DIABETES MELLITUS WITH DIABETIC POLYNEUROPATHY, WITH LONG-TERM CURRENT USE OF INSULIN (HCC): Primary | ICD-10-CM

## 2022-11-29 DIAGNOSIS — N18.5 TYPE 2 DIABETES MELLITUS WITH STAGE 5 CHRONIC KIDNEY DISEASE NOT ON CHRONIC DIALYSIS, WITH LONG-TERM CURRENT USE OF INSULIN (HCC): ICD-10-CM

## 2022-11-29 DIAGNOSIS — E03.9 HYPOTHYROIDISM, UNSPECIFIED TYPE: ICD-10-CM

## 2022-11-29 DIAGNOSIS — G89.4 CHRONIC PAIN SYNDROME: ICD-10-CM

## 2022-11-29 DIAGNOSIS — E11.621 TYPE 2 DIABETES MELLITUS WITH FOOT ULCER, WITH LONG-TERM CURRENT USE OF INSULIN (HCC): ICD-10-CM

## 2022-11-29 DIAGNOSIS — Z79.4 CURRENT USE OF INSULIN (HCC): ICD-10-CM

## 2022-11-29 DIAGNOSIS — Z79.4 TYPE 2 DIABETES MELLITUS WITH DIABETIC POLYNEUROPATHY, WITH LONG-TERM CURRENT USE OF INSULIN (HCC): Primary | ICD-10-CM

## 2022-11-29 DIAGNOSIS — K31.84 GASTROPARESIS: ICD-10-CM

## 2022-11-29 DIAGNOSIS — I25.10 CORONARY ARTERY DISEASE INVOLVING NATIVE HEART WITHOUT ANGINA PECTORIS, UNSPECIFIED VESSEL OR LESION TYPE: ICD-10-CM

## 2022-11-29 DIAGNOSIS — E11.22 TYPE 2 DIABETES MELLITUS WITH STAGE 5 CHRONIC KIDNEY DISEASE NOT ON CHRONIC DIALYSIS, WITH LONG-TERM CURRENT USE OF INSULIN (HCC): ICD-10-CM

## 2022-11-29 DIAGNOSIS — I20.8 STABLE ANGINA (HCC): ICD-10-CM

## 2022-11-29 DIAGNOSIS — Z79.4 TYPE 2 DIABETES MELLITUS WITH FOOT ULCER, WITH LONG-TERM CURRENT USE OF INSULIN (HCC): ICD-10-CM

## 2022-11-29 DIAGNOSIS — N18.32 STAGE 3B CHRONIC KIDNEY DISEASE (HCC): ICD-10-CM

## 2022-11-29 DIAGNOSIS — I10 HYPERTENSION, UNSPECIFIED TYPE: ICD-10-CM

## 2022-11-29 DIAGNOSIS — K21.9 GASTROESOPHAGEAL REFLUX DISEASE WITHOUT ESOPHAGITIS: ICD-10-CM

## 2022-11-29 DIAGNOSIS — N18.6 ESRD (END STAGE RENAL DISEASE) (HCC): ICD-10-CM

## 2022-11-29 DIAGNOSIS — Z98.1 S/P CERVICAL SPINAL FUSION: ICD-10-CM

## 2022-11-29 DIAGNOSIS — Z79.4 TYPE 2 DIABETES MELLITUS WITH STAGE 5 CHRONIC KIDNEY DISEASE NOT ON CHRONIC DIALYSIS, WITH LONG-TERM CURRENT USE OF INSULIN (HCC): ICD-10-CM

## 2022-11-29 DIAGNOSIS — K59.00 CONSTIPATION, UNSPECIFIED CONSTIPATION TYPE: ICD-10-CM

## 2022-11-29 DIAGNOSIS — F11.20 CONTINUOUS OPIOID DEPENDENCE (HCC): ICD-10-CM

## 2022-11-29 DIAGNOSIS — L97.509 TYPE 2 DIABETES MELLITUS WITH FOOT ULCER, WITH LONG-TERM CURRENT USE OF INSULIN (HCC): ICD-10-CM

## 2022-11-29 LAB — SL AMB POCT GLUCOSE BLD: 97

## 2022-11-29 RX ORDER — INSULIN GLARGINE 100 [IU]/ML
25 INJECTION, SOLUTION SUBCUTANEOUS EVERY 12 HOURS SCHEDULED
Qty: 30 ML | Refills: 0 | Status: SHIPPED | OUTPATIENT
Start: 2022-11-29

## 2022-11-29 RX ORDER — KETOCONAZOLE 20 MG/G
CREAM TOPICAL
COMMUNITY
Start: 2022-10-06

## 2022-11-29 RX ORDER — GABAPENTIN 100 MG/1
100 CAPSULE ORAL 2 TIMES DAILY
Qty: 60 CAPSULE | Refills: 1 | Status: CANCELLED | OUTPATIENT
Start: 2022-11-29

## 2022-11-29 RX ORDER — LANCETS 33 GAUGE
EACH MISCELLANEOUS
Qty: 100 EACH | Refills: 2 | Status: SHIPPED | OUTPATIENT
Start: 2022-11-29 | End: 2022-11-30 | Stop reason: SDUPTHER

## 2022-11-29 RX ORDER — CATHETER MALE,EXTERNAL 41MM
EACH MISCELLANEOUS
COMMUNITY
Start: 2022-10-31

## 2022-11-29 RX ORDER — DRAINAGE BAG
EACH MISCELLANEOUS
COMMUNITY
Start: 2022-10-31

## 2022-11-29 RX ORDER — CARVEDILOL 25 MG/1
TABLET ORAL
COMMUNITY
End: 2022-11-30 | Stop reason: SDUPTHER

## 2022-11-29 NOTE — PROGRESS NOTES
Outgoing Call:  11/29/2022    [facility] did call pt to discuss referral received for interest in applying for MA, SNAP and waiver services  All services have been applied for by [facility] in past however pt did move out of state and has now returned to [place] and would like to reinstate benefits  [facility] assessment was completed and pt agreed to services  Pt informed [facility] she had already applied for MA and SNAP benefits last week and is waiting on a response  [facility] informed pt once she is approved again we will be able to move forward with reinstating waiver services  Pt will then be able to use her [employer] services once she has her MA benefits in place  Pt expressed understanding  [facility] will assist pt in calling [employer] later this week to check on status of her application  Next outreach is scheduled for 12/2/2022

## 2022-11-30 PROBLEM — Z09 ENCOUNTER FOR EXAMINATION FOLLOWING TREATMENT AT HOSPITAL: Status: RESOLVED | Noted: 2022-02-22 | Resolved: 2022-11-30

## 2022-11-30 RX ORDER — NITROGLYCERIN 0.4 MG/1
0.4 TABLET SUBLINGUAL
Qty: 25 TABLET | Refills: 1 | Status: SHIPPED | OUTPATIENT
Start: 2022-11-30

## 2022-11-30 RX ORDER — ATORVASTATIN CALCIUM 40 MG/1
40 TABLET, FILM COATED ORAL DAILY
Qty: 90 TABLET | Refills: 1 | Status: SHIPPED | OUTPATIENT
Start: 2022-11-30

## 2022-11-30 RX ORDER — INSULIN LISPRO 100 [IU]/ML
10 INJECTION, SOLUTION INTRAVENOUS; SUBCUTANEOUS
Qty: 15 ML | Refills: 0 | Status: SHIPPED | OUTPATIENT
Start: 2022-11-30

## 2022-11-30 RX ORDER — DULAGLUTIDE 1.5 MG/.5ML
0.5 INJECTION, SOLUTION SUBCUTANEOUS WEEKLY
Qty: 6 ML | Refills: 1 | Status: SHIPPED | OUTPATIENT
Start: 2022-11-30

## 2022-11-30 RX ORDER — CLOPIDOGREL BISULFATE 75 MG/1
75 TABLET ORAL DAILY
Qty: 90 TABLET | Refills: 1 | Status: SHIPPED | OUTPATIENT
Start: 2022-11-30

## 2022-11-30 RX ORDER — ASPIRIN 81 MG/1
81 TABLET ORAL DAILY
Qty: 90 TABLET | Refills: 1 | Status: SHIPPED | OUTPATIENT
Start: 2022-11-30

## 2022-11-30 RX ORDER — NYSTATIN 100000 [USP'U]/G
POWDER TOPICAL 2 TIMES DAILY
Qty: 30 G | Refills: 1 | Status: SHIPPED | OUTPATIENT
Start: 2022-11-30

## 2022-11-30 RX ORDER — CARVEDILOL 25 MG/1
25 TABLET ORAL 2 TIMES DAILY WITH MEALS
Qty: 180 TABLET | Refills: 2 | Status: SHIPPED | OUTPATIENT
Start: 2022-11-30

## 2022-11-30 RX ORDER — LEVOTHYROXINE SODIUM 0.05 MG/1
50 TABLET ORAL DAILY
Qty: 90 TABLET | Refills: 0 | Status: SHIPPED | OUTPATIENT
Start: 2022-11-30

## 2022-11-30 RX ORDER — CITALOPRAM 40 MG/1
40 TABLET ORAL DAILY
Qty: 90 TABLET | Refills: 1 | Status: SHIPPED | OUTPATIENT
Start: 2022-11-30

## 2022-11-30 RX ORDER — OXYCODONE HYDROCHLORIDE 5 MG/1
5 TABLET ORAL EVERY 12 HOURS PRN
Qty: 60 TABLET | Refills: 0 | Status: SHIPPED | OUTPATIENT
Start: 2022-11-30

## 2022-11-30 RX ORDER — PEN NEEDLE, DIABETIC 32 GX 1/4"
NEEDLE, DISPOSABLE MISCELLANEOUS
Qty: 200 EACH | Refills: 5 | Status: SHIPPED | OUTPATIENT
Start: 2022-11-30

## 2022-11-30 RX ORDER — ISOSORBIDE MONONITRATE 30 MG/1
30 TABLET, EXTENDED RELEASE ORAL EVERY EVENING
Qty: 30 TABLET | Refills: 0 | Status: SHIPPED | OUTPATIENT
Start: 2022-11-30

## 2022-11-30 RX ORDER — TIZANIDINE 4 MG/1
4 TABLET ORAL EVERY 8 HOURS PRN
Qty: 270 TABLET | Refills: 1 | Status: SHIPPED | OUTPATIENT
Start: 2022-11-30

## 2022-11-30 RX ORDER — LANCETS 33 GAUGE
EACH MISCELLANEOUS
Qty: 100 EACH | Refills: 2 | Status: SHIPPED | OUTPATIENT
Start: 2022-11-30

## 2022-11-30 RX ORDER — ALLOPURINOL 300 MG/1
300 TABLET ORAL DAILY
Qty: 90 TABLET | Refills: 1 | Status: SHIPPED | OUTPATIENT
Start: 2022-11-30

## 2022-11-30 RX ORDER — PANTOPRAZOLE SODIUM 40 MG/1
40 TABLET, DELAYED RELEASE ORAL DAILY
Qty: 180 TABLET | Refills: 1 | Status: SHIPPED | OUTPATIENT
Start: 2022-11-30

## 2022-11-30 RX ORDER — LOSARTAN POTASSIUM 25 MG/1
25 TABLET ORAL DAILY
Qty: 30 TABLET | Refills: 0 | Status: SHIPPED | OUTPATIENT
Start: 2022-11-30

## 2022-11-30 RX ORDER — SIMETHICONE 180 MG
180 CAPSULE ORAL EVERY 8 HOURS
Qty: 40 CAPSULE | Refills: 0 | Status: SHIPPED | OUTPATIENT
Start: 2022-11-30

## 2022-11-30 NOTE — ASSESSMENT & PLAN NOTE
Lab Results   Component Value Date    EGFR 19 08/26/2022    EGFR 21 08/25/2022    EGFR 14 07/18/2022    CREATININE 2 61 (H) 08/26/2022    CREATININE 2 36 (H) 08/25/2022    CREATININE 3 33 (H) 07/18/2022     Has resumed dialysis [facility]  Continue follow up with nephrology

## 2022-11-30 NOTE — ASSESSMENT & PLAN NOTE
Wt Readings from Last 3 Encounters:   11/29/22 112 kg (246 lb 4 8 oz)   09/01/22 117 kg (258 lb 4 8 oz)   08/25/22 118 kg (260 lb 5 8 oz)       Euvolemic on exam today  Follow up with cardiology

## 2022-11-30 NOTE — ASSESSMENT & PLAN NOTE
BP is within goal, continue with carvedilol 25 mg bid and torsemide 80 mg daily, losartan 25 mg daily

## 2022-11-30 NOTE — PROGRESS NOTES
Assessment/Plan:    Type 2 diabetes mellitus with stage 5 chronic kidney disease not on chronic dialysis, with long-term current use of insulin ([facility])    Lab Results   Component Value Date    HGBA1C 8 1 (H) 08/26/2022     Unable to check A1c in office today, out of supplies  Will send her to lab to repeat A1c  At this time continue with current regimen: Lantus 25 units bid and Humalog 10 units tid  Would also like her to get Dexcom or other CGM due to very labile glucose levels   Referral to endocrinology     HTN (hypertension)  BP is within goal, continue with carvedilol 25 mg bid and torsemide 80 mg daily, losartan 25 mg daily     Chronic combined systolic and diastolic congestive heart failure ([facility] [address] )  Wt Readings from Last 3 Encounters:   11/29/22 112 kg (246 lb 4 8 oz)   09/01/22 117 kg (258 lb 4 8 oz)   08/25/22 118 kg (260 lb 5 8 oz)       Euvolemic on exam today  Follow up with cardiology       ESRD (end stage renal disease) ([place] [address] )  Lab Results   Component Value Date    EGFR 19 08/26/2022    EGFR 21 08/25/2022    EGFR 14 07/18/2022    CREATININE 2 61 (H) 08/26/2022    CREATININE 2 36 (H) 08/25/2022    CREATININE 3 33 (H) 07/18/2022     Has resumed dialysis [facility]  Continue follow up with nephrology     Pam Mcintosh was seen today for follow-up  Diagnoses and all orders for this visit:    Type 2 diabetes mellitus with diabetic polyneuropathy, with long-term current use of insulin ([facility] )  -     Cancel: POCT urine dip  -     POCT blood glucose  -     Discontinue: Lancets (OneTouch Delica Plus [id number]) MISC; USE TO TEST 3 TIMES DAILY  -     Ambulatory Referral to Podiatry; Future  -     Age 17 y+, BOOSTER (BIVALENT): COVID-19 Pfizer vac bivalent aisha-sucr  -     Ambulatory Referral to Endocrinology; Future  -     aspirin (ECOTRIN LOW STRENGTH) 81 mg EC tablet; Take 1 tablet (81 mg total) by mouth daily  -     atorvastatin (LIPITOR) 40 mg tablet;  Take 1 tablet (40 mg total) by mouth daily  -     citalopram (CeleXA) 40 mg tablet; Take 1 tablet (40 mg total) by mouth daily  -     clopidogrel (PLAVIX) 75 mg tablet; Take 1 tablet (75 mg total) by mouth daily  -     Dulaglutide (Trulicity) 1 5 OU/6 1KG SOPN; Inject 0 5 mL (1 5 mg total) under the skin once a week  -     insulin lispro (HumaLOG) 100 units/mL injection pen; Inject 10 Units under the skin 3 (three) times a day with meals  -     Lancets (OneTouch Delica Plus [id number]) MISC; USE TO TEST 3 TIMES DAILY    Gout, unspecified cause, unspecified chronicity, unspecified site  -     allopurinol (ZYLOPRIM) 300 mg tablet; Take 1 tablet (300 mg total) by mouth daily    S/P cervical spinal fusion  -     tiZANidine (ZANAFLEX) 4 mg tablet; Take 1 tablet (4 mg total) by mouth every 8 (eight) hours as needed for muscle spasms    Type 2 diabetes mellitus with foot ulcer, with long-term current use of insulin ([facility])  -     Insulin Glargine Solostar (Lantus SoloStar) 100 UNIT/ML SOPN; Inject 0 25 mL (25 Units total) under the skin every 12 (twelve) hours  -     Insulin Pen Needle (BD Pen Needle Micro U/F) 32G X 6 MM MISC; Use 5 (five) times a day    Coronary artery disease involving native heart without angina pectoris, unspecified vessel or lesion type  -     Discontinue: Lancets (OneTouch Delica Plus [id number]) MISC; USE TO TEST 3 TIMES DAILY  -     aspirin (ECOTRIN LOW STRENGTH) 81 mg EC tablet; Take 1 tablet (81 mg total) by mouth daily  -     atorvastatin (LIPITOR) 40 mg tablet; Take 1 tablet (40 mg total) by mouth daily  -     citalopram (CeleXA) 40 mg tablet; Take 1 tablet (40 mg total) by mouth daily  -     clopidogrel (PLAVIX) 75 mg tablet;  Take 1 tablet (75 mg total) by mouth daily  -     Lancets (OneTouch Delica Plus [id number]) MISC; USE TO TEST 3 TIMES DAILY  -     nitroglycerin (NITROSTAT) 0 4 mg SL tablet; Place 1 tablet (0 4 mg total) under the tongue every 5 (five) minutes as needed for chest pain    Stage 3b chronic kidney disease (HCC)  -     Discontinue: Lancets (OneTouch Delica Plus [id number]) MISC; USE TO TEST 3 TIMES DAILY  -     aspirin (ECOTRIN LOW STRENGTH) 81 mg EC tablet; Take 1 tablet (81 mg total) by mouth daily  -     atorvastatin (LIPITOR) 40 mg tablet; Take 1 tablet (40 mg total) by mouth daily  -     citalopram (CeleXA) 40 mg tablet; Take 1 tablet (40 mg total) by mouth daily  -     clopidogrel (PLAVIX) 75 mg tablet; Take 1 tablet (75 mg total) by mouth daily  -     Lancets (OneTouch Delica Plus [id number]) MISC; USE TO TEST 3 TIMES DAILY    Current use of insulin ([facility])  -     Discontinue: Lancets (OneTouch Delica Plus [id number]) MISC; USE TO TEST 3 TIMES DAILY  -     aspirin (ECOTRIN LOW STRENGTH) 81 mg EC tablet; Take 1 tablet (81 mg total) by mouth daily  -     atorvastatin (LIPITOR) 40 mg tablet; Take 1 tablet (40 mg total) by mouth daily  -     citalopram (CeleXA) 40 mg tablet; Take 1 tablet (40 mg total) by mouth daily  -     clopidogrel (PLAVIX) 75 mg tablet; Take 1 tablet (75 mg total) by mouth daily  -     Lancets (OneTouch Delica Plus [id number]) MISC; USE TO TEST 3 TIMES DAILY    Type 2 diabetes mellitus with stage 5 chronic kidney disease not on chronic dialysis, with long-term current use of insulin ([facility])    Hypertension, unspecified type  -     carvedilol (COREG) 25 mg tablet; Take 1 tablet (25 mg total) by mouth 2 (two) times a day with meals  -     isosorbide mononitrate (IMDUR) 30 mg 24 hr tablet; Take 1 tablet (30 mg total) by mouth every evening  -     losartan (COZAAR) 25 mg tablet; Take 1 tablet (25 mg total) by mouth daily  -     Torsemide 40 MG TABS; Take 80 mg by mouth daily    ESRD (end stage renal disease) ([facility])    Constipation, unspecified constipation type  -     docusate sodium (COLACE) 50 mg capsule; Take 1 capsule (50 mg total) by mouth 2 (two) times a day    Stable angina ([facility])    Hypothyroidism, unspecified type  -     levothyroxine 50 mcg tablet;  Take 1 tablet (50 mcg total) by mouth daily    Intertrigo  -     nystatin (MYCOSTATIN) powder; Apply topically 2 (two) times a day    Gastroesophageal reflux disease without esophagitis  -     pantoprazole (PROTONIX) 40 mg tablet; Take 1 tablet (40 mg total) by mouth daily    Gastroparesis  -     simethicone (MYLICON,GAS-X) 082 MG capsule; Take 1 capsule (180 mg total) by mouth every 8 (eight) hours    Continuous opioid dependence (HCC)  -     oxyCODONE (ROXICODONE) 5 immediate release tablet; Take 1 tablet (5 mg total) by mouth every 12 (twelve) hours as needed for severe pain Max Daily Amount: 10 mg    Chronic pain syndrome  -     oxyCODONE (ROXICODONE) 5 immediate release tablet; Take 1 tablet (5 mg total) by mouth every 12 (twelve) hours as needed for severe pain Max Daily Amount: 10 mg      Return in about 4 weeks (around 12/27/2022) for htn,diabetes   Subjective:     Robinson Clinton is a 63 y o  female who  has a past medical history of Abnormal liver function, Anemia, Anxiety, Arthritis, Chronic kidney disease, Chronic narcotic dependence ([address] ), Chronic pain disorder, Coronary artery disease, Depression, Diabetes mellitus ([address] ), Elevated troponin, GERD (gastroesophageal reflux disease), Heart murmur, Hyperlipidemia, Hypertension, Neurogenic bladder, Open toe wound, Renal disorder, Shortness of breath, Sleep apnea, and Suprapubic catheter ([address] )  who presented to the office today for follow up       The following portions of the patient's history were reviewed and updated as appropriate: allergies, current medications, past family history, past medical history, past social history, past surgical history and problem list     Current Outpatient Medications on File Prior to Visit   Medication Sig Dispense Refill   • Incontinence Supplies (Urinary Drainage Bag) MISC Attach one bag to suprapubic catheter as needed     • Incontinence Supplies (Urinary Leg Bag) KIT Attach one bag to suprapubic catheter morales as needed     • ketoconazole (NIZORAL) 2 % cream Apply to suprapubic catheter site twice daily  • OLANZapine (ZyPREXA) 5 mg tablet TAKE 1 TABLET BY MOUTH AT BEDTIME 90 tablet 0   • ferrous sulfate 325 (65 Fe) mg tablet Take 1 tablet (325 mg total) by mouth every other day 45 tablet 0   • Insulin Syringe-Needle U-100 (BD Veo Insulin Syringe U/F) 31G X 15/64" 0 5 ML MISC Inject under the skin 3 (three) times a day 100 each 2   • naloxone (NARCAN) 4 mg/0 1 mL nasal spray Administer 1 spray into a nostril  If breathing does not return to normal or if breathing difficulty resumes after 2-3 minutes, give another dose in the other nostril using a new spray   1 each 1   • ondansetron (ZOFRAN-ODT) 4 mg disintegrating tablet Take 1 tablet (4 mg total) by mouth every 8 (eight) hours as needed for nausea or vomiting 20 tablet 0   • OneTouch Ultra test strip USE 1 EACH DAILY USE AS INSTRUCTED 100 strip 2   • [DISCONTINUED] allopurinol (ZYLOPRIM) 300 mg tablet Take 1 tablet (300 mg total) by mouth daily 90 tablet 1   • [DISCONTINUED] aspirin (ECOTRIN LOW STRENGTH) 81 mg EC tablet Take 1 tablet (81 mg total) by mouth daily 90 tablet 1   • [DISCONTINUED] atorvastatin (LIPITOR) 40 mg tablet Take 1 tablet (40 mg total) by mouth daily 90 tablet 1   • [DISCONTINUED] carvedilol (COREG) 25 mg tablet Take by mouth     • [DISCONTINUED] citalopram (CeleXA) 40 mg tablet Take 1 tablet (40 mg total) by mouth daily 90 tablet 1   • [DISCONTINUED] clopidogrel (PLAVIX) 75 mg tablet Take 1 tablet (75 mg total) by mouth daily 90 tablet 1   • [DISCONTINUED] docusate sodium (COLACE) 50 mg capsule Take 1 capsule (50 mg total) by mouth 2 (two) times a day 180 capsule 0   • [DISCONTINUED] Dulaglutide (Trulicity) 1 5 DV/6 9PT SOPN Inject 0 5 mL (1 5 mg total) under the skin once a week 6 mL 1   • [DISCONTINUED] gabapentin (NEURONTIN) 100 mg capsule Take 1 capsule (100 mg total) by mouth 2 (two) times a day 60 capsule 1   • [DISCONTINUED] insulin lispro (HumaLOG) 100 units/mL injection pen Inject 10 Units under the skin 3 (three) times a day with meals 15 mL 0   • [DISCONTINUED] Insulin Pen Needle (BD Pen Needle Micro U/F) 32G X 6 MM MISC Use 5 (five) times a day 200 each 5   • [DISCONTINUED] isosorbide mononitrate (IMDUR) 30 mg 24 hr tablet TAKE 1 TABLET BY MOUTH EVERY EVENING  30 tablet 0   • [DISCONTINUED] isosorbide mononitrate (IMDUR) 60 mg 24 hr tablet Take 1 tablet (60 mg total) by mouth daily 90 tablet 2   • [DISCONTINUED] levothyroxine 50 mcg tablet TAKE 1 TABLET BY MOUTH EVERY DAY 90 tablet 0   • [DISCONTINUED] losartan (COZAAR) 25 mg tablet Take 1 tablet (25 mg total) by mouth daily 30 tablet 0   • [DISCONTINUED] nitroglycerin (NITROSTAT) 0 4 mg SL tablet Place 1 tablet (0 4 mg total) under the tongue every 5 (five) minutes as needed for chest pain 25 tablet 1   • [DISCONTINUED] nystatin (MYCOSTATIN) powder Apply topically 2 (two) times a day 30 g 1   • [DISCONTINUED] oxyCODONE (ROXICODONE) 5 immediate release tablet Take 1 tablet (5 mg total) by mouth every 8 (eight) hours as needed for moderate pain Max Daily Amount: 15 mg 90 tablet 0   • [DISCONTINUED] oxygen gas Inhale 2 L/min continuous  Indications: Respiratory Failure     • [DISCONTINUED] pantoprazole (PROTONIX) 40 mg tablet TAKE 1 TABLET BY MOUTH TWICE A  tablet 1   • [DISCONTINUED] simethicone (MYLICON,GAS-X) 387 MG capsule Take 1 capsule (180 mg total) by mouth every 8 (eight) hours 40 capsule 0   • [DISCONTINUED] tiZANidine (ZANAFLEX) 4 mg tablet Take 1 tablet (4 mg total) by mouth every 8 (eight) hours as needed for muscle spasms 270 tablet 1   • [DISCONTINUED] Torsemide 40 MG TABS Take 80 mg by mouth daily 180 tablet 0     No current facility-administered medications on file prior to visit  Review of Systems   Constitutional: Negative for chills and fever  HENT: Negative for ear pain and sore throat  Eyes: Negative for pain and visual disturbance  Respiratory: Negative for cough and shortness of breath      Cardiovascular: Negative for chest pain and palpitations  Gastrointestinal: Negative for abdominal pain and vomiting  Genitourinary: Negative for dysuria and hematuria  Musculoskeletal: Positive for arthralgias, back pain and myalgias  Skin: Negative for color change and rash  Neurological: Negative for seizures and syncope  All other systems reviewed and are negative  Objective:    /70 (BP Location: Right arm, Patient Position: Sitting, Cuff Size: Large)   Pulse 74   Temp (!) 96 °F (35 6 °C) (Temporal)   Resp 19   Ht 5' 8" (1 727 m)   Wt 112 kg (246 lb 4 8 oz)   SpO2 97%   BMI 37 45 kg/m²     Physical Exam  Vitals and nursing note reviewed  Constitutional:       Appearance: She is ill-appearing  HENT:      Right Ear: External ear normal       Left Ear: External ear normal    Eyes:      General:         Right eye: No discharge  Left eye: No discharge  Cardiovascular:      Rate and Rhythm: Normal rate and regular rhythm  Pulmonary:      Effort: Pulmonary effort is normal  No respiratory distress  Comments: RCW catheter   Abdominal:      General: Bowel sounds are normal  There is no distension  Tenderness: There is no abdominal tenderness  Genitourinary:     Comments: +suprapubic catheter  Musculoskeletal:      Right lower leg: Edema present  Left lower leg: Edema present  Neurological:      Mental Status: She is alert and oriented to person, place, and time     Psychiatric:         Behavior: Behavior normal          CHERELLE Salcido  11/30/22  5:22 PM

## 2022-11-30 NOTE — ASSESSMENT & PLAN NOTE
Lab Results   Component Value Date    HGBA1C 8 1 (H) 08/26/2022     Unable to check A1c in office today, out of supplies  Will send her to lab to repeat A1c  At this time continue with current regimen: Lantus 25 units bid and Humalog 10 units tid  Would also like her to get Dexcom or other CGM due to very labile glucose levels   Referral to endocrinology

## 2022-12-02 ENCOUNTER — PATIENT OUTREACH (OUTPATIENT)
Dept: FAMILY MEDICINE CLINIC | Facility: CLINIC | Age: 62
End: 2022-12-02

## 2022-12-02 NOTE — PROGRESS NOTES
Outgoing Call:  12/2/2022    [facility] did call pt to assist in calling [employer] to request an update for her SNAP and MA application  Pt stated that she did receive a call from her [facility] and was informed she was approved for emergency SNAP  Pt confirmed she has $284 in SNAP benefits on her EBT card  Pt stated that she will need to provide most recent bank statement to be approved for ongoing and SNAP  MA will not be considered until pt provides this document  Pt stated that she is going to discuss with her daughter Naye Rizo and attempt to retrieve a copy of her statement online and forward to [facility]  [facility] will forward to [employer] once received  [facility] to f/u  Next outreach is scheduled for 12/9/2022

## 2022-12-09 ENCOUNTER — PATIENT OUTREACH (OUTPATIENT)
Dept: FAMILY MEDICINE CLINIC | Facility: CLINIC | Age: 62
End: 2022-12-09

## 2022-12-09 NOTE — PROGRESS NOTES
Chart Review:  12/9/22    [facility] did receive a partial bank statement from pt via text  This was requested by [employer] to complete her MA application  [facility]OC did forward to [employer] for review  [facility] to f/u  Next outreach is scheduled for 12/14/22

## 2022-12-11 ENCOUNTER — APPOINTMENT (EMERGENCY)
Dept: RADIOLOGY | Facility: HOSPITAL | Age: 62
End: 2022-12-11

## 2022-12-11 ENCOUNTER — PREP FOR PROCEDURE (OUTPATIENT)
Dept: INTERVENTIONAL RADIOLOGY/VASCULAR | Facility: CLINIC | Age: 62
End: 2022-12-11

## 2022-12-11 ENCOUNTER — HOSPITAL ENCOUNTER (EMERGENCY)
Facility: HOSPITAL | Age: 62
Discharge: HOME | End: 2022-12-11
Attending: EMERGENCY MEDICINE

## 2022-12-11 VITALS
TEMPERATURE: 97.8 F | HEART RATE: 79 BPM | RESPIRATION RATE: 16 BRPM | DIASTOLIC BLOOD PRESSURE: 58 MMHG | OXYGEN SATURATION: 96 % | SYSTOLIC BLOOD PRESSURE: 99 MMHG | BODY MASS INDEX: 35.18 KG/M2 | WEIGHT: 232.14 LBS | HEIGHT: 68 IN

## 2022-12-11 DIAGNOSIS — Z99.2 ESRD ON HEMODIALYSIS (HCC): ICD-10-CM

## 2022-12-11 DIAGNOSIS — N18.6 ESRD ON HEMODIALYSIS (HCC): ICD-10-CM

## 2022-12-11 DIAGNOSIS — Z78.9 PROBLEM WITH VASCULAR ACCESS: Primary | ICD-10-CM

## 2022-12-11 DIAGNOSIS — N18.6 ESRD (END STAGE RENAL DISEASE) (HCC): Primary | ICD-10-CM

## 2022-12-11 LAB
ANION GAP SERPL CALCULATED.3IONS-SCNC: 13 MMOL/L (ref 4–13)
BASOPHILS # BLD AUTO: 0.05 THOUSANDS/ÂΜL (ref 0–0.1)
BASOPHILS NFR BLD AUTO: 1 % (ref 0–1)
BUN SERPL-MCNC: 58 MG/DL (ref 5–25)
CALCIUM SERPL-MCNC: 9.3 MG/DL (ref 8.3–10.1)
CHLORIDE SERPL-SCNC: 99 MMOL/L (ref 96–108)
CO2 SERPL-SCNC: 27 MMOL/L (ref 21–32)
CREAT SERPL-MCNC: 3.48 MG/DL (ref 0.6–1.3)
EOSINOPHIL # BLD AUTO: 0.11 THOUSAND/ÂΜL (ref 0–0.61)
EOSINOPHIL NFR BLD AUTO: 1 % (ref 0–6)
ERYTHROCYTE [DISTWIDTH] IN BLOOD BY AUTOMATED COUNT: 14.3 % (ref 11.6–15.1)
GFR SERPL CREATININE-BSD FRML MDRD: 13 ML/MIN/1.73SQ M
GLUCOSE SERPL-MCNC: 205 MG/DL (ref 65–140)
HCT VFR BLD AUTO: 34.9 % (ref 34.8–46.1)
HGB BLD-MCNC: 11.8 G/DL (ref 11.5–15.4)
IMM GRANULOCYTES # BLD AUTO: 0.02 THOUSAND/UL (ref 0–0.2)
IMM GRANULOCYTES NFR BLD AUTO: 0 % (ref 0–2)
LYMPHOCYTES # BLD AUTO: 1.97 THOUSANDS/ÂΜL (ref 0.6–4.47)
LYMPHOCYTES NFR BLD AUTO: 19 % (ref 14–44)
MCH RBC QN AUTO: 33.6 PG (ref 26.8–34.3)
MCHC RBC AUTO-ENTMCNC: 33.8 G/DL (ref 31.4–37.4)
MCV RBC AUTO: 99 FL (ref 82–98)
MONOCYTES # BLD AUTO: 0.76 THOUSAND/ÂΜL (ref 0.17–1.22)
MONOCYTES NFR BLD AUTO: 8 % (ref 4–12)
NEUTROPHILS # BLD AUTO: 7.29 THOUSANDS/ÂΜL (ref 1.85–7.62)
NEUTS SEG NFR BLD AUTO: 71 % (ref 43–75)
NRBC BLD AUTO-RTO: 0 /100 WBCS
PLATELET # BLD AUTO: 268 THOUSANDS/UL (ref 149–390)
PMV BLD AUTO: 9.1 FL (ref 8.9–12.7)
POTASSIUM SERPL-SCNC: 5.3 MMOL/L (ref 3.5–5.3)
RBC # BLD AUTO: 3.51 MILLION/UL (ref 3.81–5.12)
SODIUM SERPL-SCNC: 139 MMOL/L (ref 135–147)
WBC # BLD AUTO: 10.2 THOUSAND/UL (ref 4.31–10.16)

## 2022-12-11 RX ORDER — ONDANSETRON 2 MG/ML
4 INJECTION INTRAMUSCULAR; INTRAVENOUS ONCE
Status: COMPLETED | OUTPATIENT
Start: 2022-12-11 | End: 2022-12-11

## 2022-12-11 RX ADMIN — ONDANSETRON 4 MG: 2 INJECTION INTRAMUSCULAR; INTRAVENOUS at 20:08

## 2022-12-11 NOTE — ED PROVIDER NOTES
History  Chief Complaint   Patient presents with   • Vascular Access Problem     Dialysis port fell out at home     62y F ESRD, hemodialysis Tu, Th, Sat  had normal session yesterday and today chest wall dialysis access "fell out"  pt states was feeling "itchy" and then noticed it "laying on my stomach"  no significant bleeding from the site  Denies any f/c/s, no cp/pressure, no sob/rahman, no cough/congestion  Appetite okay, no n/v/d  No other complaints      History provided by:  Patient   used: No    Medical Problem  Location:  Lost chest wall dialysis access today  Quality:  Area is "itchy"  Severity:  Mild  Onset quality:  Sudden  Timing:  Constant  Progression:  Unchanged  Chronicity:  New  Context:  Esrd, hemodialysis  Relieved by:  N/a  Worsened by:  N/a  Ineffective treatments:  N/a  Associated symptoms: no chest pain, no congestion, no fever, no nausea, no shortness of breath and no vomiting        Prior to Admission Medications   Prescriptions Last Dose Informant Patient Reported? Taking?    Dulaglutide (Trulicity) 1 5 PJ/2 6CI SOPN   No No   Sig: Inject 0 5 mL (1 5 mg total) under the skin once a week   Incontinence Supplies (Urinary Drainage Bag) MISC   Yes No   Sig: Attach one bag to suprapubic catheter as needed   Incontinence Supplies (Urinary Leg Bag) KIT   Yes No   Sig: Attach one bag to suprapubic catheter morales as needed   Insulin Glargine Solostar (Lantus SoloStar) 100 UNIT/ML SOPN   No No   Sig: Inject 0 25 mL (25 Units total) under the skin every 12 (twelve) hours   Insulin Pen Needle (BD Pen Needle Micro U/F) 32G X 6 MM MISC   No No   Sig: Use 5 (five) times a day   Insulin Syringe-Needle U-100 (BD Veo Insulin Syringe U/F) 31G X 15/64" 0 5 ML MISC   No No   Sig: Inject under the skin 3 (three) times a day   Lancets (OneTouch Delica Plus [id number]) MISC   No No   Sig: USE TO TEST 3 TIMES DAILY   OLANZapine (ZyPREXA) 5 mg tablet   No No   Sig: TAKE 1 TABLET BY MOUTH AT BEDTIME   OneTouch Ultra test strip   No No   Sig: USE 1 EACH DAILY USE AS INSTRUCTED   Torsemide 40 MG TABS   No No   Sig: Take 80 mg by mouth daily   allopurinol (ZYLOPRIM) 300 mg tablet   No No   Sig: Take 1 tablet (300 mg total) by mouth daily   aspirin (ECOTRIN LOW STRENGTH) 81 mg EC tablet   No No   Sig: Take 1 tablet (81 mg total) by mouth daily   atorvastatin (LIPITOR) 40 mg tablet   No No   Sig: Take 1 tablet (40 mg total) by mouth daily   carvedilol (COREG) 25 mg tablet   No No   Sig: Take 1 tablet (25 mg total) by mouth 2 (two) times a day with meals   citalopram (CeleXA) 40 mg tablet   No No   Sig: Take 1 tablet (40 mg total) by mouth daily   clopidogrel (PLAVIX) 75 mg tablet   No No   Sig: Take 1 tablet (75 mg total) by mouth daily   docusate sodium (COLACE) 50 mg capsule   No No   Sig: Take 1 capsule (50 mg total) by mouth 2 (two) times a day   ferrous sulfate 325 (65 Fe) mg tablet   No No   Sig: Take 1 tablet (325 mg total) by mouth every other day   insulin lispro (HumaLOG) 100 units/mL injection pen   No No   Sig: Inject 10 Units under the skin 3 (three) times a day with meals   isosorbide mononitrate (IMDUR) 30 mg 24 hr tablet   No No   Sig: Take 1 tablet (30 mg total) by mouth every evening   ketoconazole (NIZORAL) 2 % cream   Yes No   Sig: Apply to suprapubic catheter site twice daily  levothyroxine 50 mcg tablet   No No   Sig: Take 1 tablet (50 mcg total) by mouth daily   losartan (COZAAR) 25 mg tablet   No No   Sig: Take 1 tablet (25 mg total) by mouth daily   naloxone (NARCAN) 4 mg/0 1 mL nasal spray   No No   Sig: Administer 1 spray into a nostril  If breathing does not return to normal or if breathing difficulty resumes after 2-3 minutes, give another dose in the other nostril using a new spray     nitroglycerin (NITROSTAT) 0 4 mg SL tablet   No No   Sig: Place 1 tablet (0 4 mg total) under the tongue every 5 (five) minutes as needed for chest pain   nystatin (MYCOSTATIN) powder   No No Sig: Apply topically 2 (two) times a day   ondansetron (ZOFRAN-ODT) 4 mg disintegrating tablet   No No   Sig: Take 1 tablet (4 mg total) by mouth every 8 (eight) hours as needed for nausea or vomiting   oxyCODONE (ROXICODONE) 5 immediate release tablet   No No   Sig: Take 1 tablet (5 mg total) by mouth every 12 (twelve) hours as needed for severe pain Max Daily Amount: 10 mg   pantoprazole (PROTONIX) 40 mg tablet   No No   Sig: Take 1 tablet (40 mg total) by mouth daily   simethicone (MYLICON,GAS-X) 090 MG capsule   No No   Sig: Take 1 capsule (180 mg total) by mouth every 8 (eight) hours   tiZANidine (ZANAFLEX) 4 mg tablet   No No   Sig: Take 1 tablet (4 mg total) by mouth every 8 (eight) hours as needed for muscle spasms      Facility-Administered Medications: None       Past Medical History:   Diagnosis Date   • Abnormal liver function    • Anemia    • Anxiety    • Arthritis    • Chronic kidney disease     stage 3   • Chronic narcotic dependence (HCC)    • Chronic pain disorder     lower back, hands , neck and knees   • Coronary artery disease    • Depression    • Diabetes mellitus (HCC)    • Elevated troponin 2/11/2022   • GERD (gastroesophageal reflux disease)     no meds at present   • Heart murmur     murmur   • Hyperlipidemia    • Hypertension    • Neurogenic bladder    • Open toe wound 12/2020    right big toe open calus but is dry at present   • Renal disorder    • Shortness of breath     exertion   • Sleep apnea     doesn't use cpap   • Suprapubic catheter [facility])        Past Surgical History:   Procedure Laterality Date   • AMPUTATION     • ANGIOPLASTY  2017 5   • BREAST EXCISIONAL BIOPSY Left    • BREAST SURGERY     • CARDIAC CATHETERIZATION     • CARDIAC CATHETERIZATION N/A 7/1/2022    Procedure: Cardiac catheterization;  Surgeon: Juliette Farley MD;  Location: [facility];   Service: Cardiology   • CARDIAC CATHETERIZATION N/A 7/1/2022    Procedure: Cardiac pci;  Surgeon: Juliette Farley MD; Location: [facility]; Service: Cardiology   • CARPAL TUNNEL RELEASE Bilateral    • CERVICAL FUSION     • HYSTERECTOMY  2008   • IR SUPRAPUBIC CATHETER PLACEMENT  6/15/2021   • IR TUNNELED DIALYSIS CATHETER PLACEMENT  7/15/2022   • KNEE SURGERY     • OOPHORECTOMY  2008   • NM EXC B9 LESION MRGN XCP SK TG S/N/H/F/G 3 1-4 0CM N/A 12/21/2020    Procedure: EXCISION SEBACEOUS CYST X 5 SCALP;  Surgeon: Oanh Junior MD;  Location:  MAIN OR;  Service: General   • TOE AMPUTATION Left    • TRIGGER FINGER RELEASE Right     4th Finger       Family History   Problem Relation Age of Onset   • Stroke Father    • Heart disease Father    • No Known Problems Mother    • No Known Problems Sister    • No Known Problems Daughter    • No Known Problems Maternal Grandmother    • No Known Problems Maternal Grandfather    • No Known Problems Paternal Grandmother    • No Known Problems Paternal Grandfather    • No Known Problems Maternal Aunt    • No Known Problems Maternal Aunt    • No Known Problems Maternal Aunt    • No Known Problems Maternal Aunt    • No Known Problems Maternal Aunt    • No Known Problems Maternal Aunt    • No Known Problems Paternal Aunt      I have reviewed and agree with the history as documented  E-Cigarette/Vaping   • E-Cigarette Use Never User      E-Cigarette/Vaping Substances   • Nicotine No    • THC No    • CBD No    • Flavoring No    • Other No    • Unknown No      Social History     Tobacco Use   • Smoking status: Former     Packs/day: 1 00     Years: 35 00     Pack years: 35 00     Types: Cigarettes     Quit date: 2012     Years since quitting: 10 9   • Smokeless tobacco: Never   Vaping Use   • Vaping Use: Never used   Substance Use Topics   • Alcohol use: Not Currently   • Drug use: Never       Review of Systems   Constitutional: Negative for chills, diaphoresis and fever  HENT: Negative for congestion  Respiratory: Negative for chest tightness and shortness of breath      Cardiovascular: Negative for chest pain and palpitations  Gastrointestinal: Negative for nausea and vomiting  Genitourinary: Negative for flank pain and hematuria  Neurological: Negative for weakness and numbness  All other systems reviewed and are negative  Physical Exam  Physical Exam  Vitals and nursing note reviewed  Constitutional:       Appearance: Normal appearance  HENT:      Nose: Nose normal    Eyes:      Conjunctiva/sclera: Conjunctivae normal    Cardiovascular:      Rate and Rhythm: Normal rate  Pulmonary:      Effort: Pulmonary effort is normal       Comments: Right chest wall access site c/d/i  Abdominal:      Palpations: Abdomen is soft  Musculoskeletal:         General: No tenderness  Cervical back: Normal range of motion  Skin:     General: Skin is warm  Neurological:      General: No focal deficit present  Mental Status: She is alert and oriented to person, place, and time     Psychiatric:         Mood and Affect: Mood normal          Vital Signs  ED Triage Vitals [12/11/22 1501]   Temperature Pulse Respirations Blood Pressure SpO2   97 8 °F (36 6 °C) 79 18 135/65 99 %      Temp Source Heart Rate Source Patient Position - Orthostatic VS BP Location FiO2 (%)   Oral Monitor Lying Right arm --      Pain Score       No Pain           Vitals:    12/11/22 1501 12/11/22 2011   BP: 135/65 99/58   Pulse: 79 79   Patient Position - Orthostatic VS: Lying Sitting         Visual Acuity      ED Medications  Medications   ondansetron (ZOFRAN) injection 4 mg (4 mg Intravenous Given 12/11/22 2008)       Diagnostic Studies  Results Reviewed     Procedure Component Value Units Date/Time    Basic metabolic panel [[id number]]  (Abnormal) Collected: 12/11/22 2005    Lab Status: Final result Specimen: Blood from Arm, Left Updated: 12/11/22 2023     Sodium 139 mmol/L      Potassium 5 3 mmol/L      Chloride 99 mmol/L      CO2 27 mmol/L      ANION GAP 13 mmol/L      BUN 58 mg/dL      Creatinine 3 48 mg/dL Glucose 205 mg/dL      Calcium 9 3 mg/dL      eGFR 13 ml/min/1 73sq m     Narrative:      National Kidney Disease Foundation guidelines for Chronic Kidney Disease (CKD):   •  Stage 1 with normal or high GFR (GFR > 90 mL/min/1 73 square meters)  •  Stage 2 Mild CKD (GFR = 60-89 mL/min/1 73 square meters)  •  Stage 3A Moderate CKD (GFR = 45-59 mL/min/1 73 square meters)  •  Stage 3B Moderate CKD (GFR = 30-44 mL/min/1 73 square meters)  •  Stage 4 Severe CKD (GFR = 15-29 mL/min/1 73 square meters)  •  Stage 5 End Stage CKD (GFR <15 mL/min/1 73 square meters)  Note: GFR calculation is accurate only with a steady state creatinine    CBC and differential [[id number]]  (Abnormal) Collected: 12/11/22 2005    Lab Status: Final result Specimen: Blood from Arm, Left Updated: 12/11/22 2010     WBC 10 20 Thousand/uL      RBC 3 51 Million/uL      Hemoglobin 11 8 g/dL      Hematocrit 34 9 %      MCV 99 fL      MCH 33 6 pg      MCHC 33 8 g/dL      RDW 14 3 %      MPV 9 1 fL      Platelets 557 Thousands/uL      nRBC 0 /100 WBCs      Neutrophils Relative 71 %      Immat GRANS % 0 %      Lymphocytes Relative 19 %      Monocytes Relative 8 %      Eosinophils Relative 1 %      Basophils Relative 1 %      Neutrophils Absolute 7 29 Thousands/µL      Immature Grans Absolute 0 02 Thousand/uL      Lymphocytes Absolute 1 97 Thousands/µL      Monocytes Absolute 0 76 Thousand/µL      Eosinophils Absolute 0 11 Thousand/µL      Basophils Absolute 0 05 Thousands/µL                  XR chest 2 views   ED Interpretation by Sharonda Oro DO (12/11 1626)   No acute findings                 Procedures  Procedures         ED Course  ED Course as of 12/12/22 0812   Sun Dec 11, 2022   1600 TT to Nephrology regarding plan   1605 TT sent to IR to see if they can accommodate patient tomorrow for outpt access placement   1626 Per Dr Hector Salas, IR, should be able to accommodate and will place orders for her procedure tomorrow   1700 Pt now reports "uti" symptoms of urgency and asking to be checked  Pt states suprapubic catheter not changed for at least 2-3 months ("last time in [place]")  Pt afebrile, no SIRS, no c/o f/c/s, no c/o abd pain or changes in urine outpt  Will change out suprapubic to obtain a fresh sample  1817 Attempted to change out suprapubic catheter - met significant resistance  Checked to see if all fluid out of balloon, attempted slow, steady pressure, and unable to remove existing catheter  Will TT Urology   [phone] D/w Magnolia Dalyt, Urology  No one "on site" to change out  Recommends obtaining labs  If elevated WBC, would recommend admission, empiric antibiotics (even w/o clean specimen) and urology will see the next day    If no elevated WBC, no empiric antibiotics and she can be seen in the office tomorrow for attempted change  If she does have calcifications, etc, may need to go to the [facility] transferred to Dr Abril Ramos - discussed plan with urology  SBIRT 20yo+    Flowsheet Row Most Recent Value   SBIRT (25 yo +)    In order to provide better care to our patients, we are screening all of our patients for alcohol and drug use  Would it be okay to ask you these screening questions?  Unable to answer at this time Filed at: 12/11/2022 1504                    MDM  Number of Diagnoses or Management Options  ESRD on hemodialysis [facility]): new and requires workup  Problem with vascular access: new and requires workup     Amount and/or Complexity of Data Reviewed  Clinical lab tests: ordered and reviewed  Discuss the patient with other providers: yes  Independent visualization of images, tracings, or specimens: yes        Disposition  Final diagnoses:   Problem with vascular access   ESRD on hemodialysis [facility])     Time reflects when diagnosis was documented in both MDM as applicable and the Disposition within this note     Time User Action Codes Description Comment    12/11/2022  4:35 PM Dawn Walsh Add [Z78 9] Problem with vascular access     12/11/2022  4:35 PM Efrain Garcia Add [N18 6,  Z99 2] ESRD on hemodialysis [facility])       ED Disposition     ED Disposition   Discharge    Condition   Stable    Date/Time   Sun Dec 11, 2022  4:34 PM    Comment   Eric Hartmann discharge to home/self care                 Follow-up Information     Follow up With Specialties Details Why [address] [facility] Schedule an appointment as soon as possible for a visit   [place] [id number]  5  [facility], [address], [facility] [place], [address] [phone]          Discharge Medication List as of 12/11/2022  8:31 PM      CONTINUE these medications which have NOT CHANGED    Details   OLANZapine (ZyPREXA) 5 mg tablet TAKE 1 TABLET BY MOUTH AT BEDTIME, Normal      allopurinol (ZYLOPRIM) 300 mg tablet Take 1 tablet (300 mg total) by mouth daily, Starting Wed 11/30/2022, Normal      aspirin (ECOTRIN LOW STRENGTH) 81 mg EC tablet Take 1 tablet (81 mg total) by mouth daily, Starting Wed 11/30/2022, Normal      atorvastatin (LIPITOR) 40 mg tablet Take 1 tablet (40 mg total) by mouth daily, Starting Wed 11/30/2022, Normal      carvedilol (COREG) 25 mg tablet Take 1 tablet (25 mg total) by mouth 2 (two) times a day with meals, Starting Wed 11/30/2022, Normal      citalopram (CeleXA) 40 mg tablet Take 1 tablet (40 mg total) by mouth daily, Starting Wed 11/30/2022, Normal      clopidogrel (PLAVIX) 75 mg tablet Take 1 tablet (75 mg total) by mouth daily, Starting Wed 11/30/2022, Normal      docusate sodium (COLACE) 50 mg capsule Take 1 capsule (50 mg total) by mouth 2 (two) times a day, Starting Wed 11/30/2022, Normal      Dulaglutide (Trulicity) 1 5 GG/7 1CA SOPN Inject 0 5 mL (1 5 mg total) under the skin once a week, Starting Wed 11/30/2022, Normal ferrous sulfate 325 (65 Fe) mg tablet Take 1 tablet (325 mg total) by mouth every other day, Starting Thu 9/1/2022, Normal      Incontinence Supplies (Urinary Drainage Bag) MISC Attach one bag to suprapubic catheter as needed, Historical Med      Incontinence Supplies (Urinary Leg Bag) KIT Attach one bag to suprapubic catheter morales as needed, Historical Med      Insulin Glargine Solostar (Lantus SoloStar) 100 UNIT/ML SOPN Inject 0 25 mL (25 Units total) under the skin every 12 (twelve) hours, Starting Tue 11/29/2022, Normal      insulin lispro (HumaLOG) 100 units/mL injection pen Inject 10 Units under the skin 3 (three) times a day with meals, Starting Wed 11/30/2022, Normal      Insulin Pen Needle (BD Pen Needle Micro U/F) 32G X 6 MM MISC Use 5 (five) times a day, Starting Wed 11/30/2022, Normal      Insulin Syringe-Needle U-100 (BD Veo Insulin Syringe U/F) 31G X 15/64" 0 5 ML MISC Inject under the skin 3 (three) times a day, Starting Tue 3/29/2022, Normal      isosorbide mononitrate (IMDUR) 30 mg 24 hr tablet Take 1 tablet (30 mg total) by mouth every evening, Starting Wed 11/30/2022, Normal      ketoconazole (NIZORAL) 2 % cream Apply to suprapubic catheter site twice daily  , Historical Med      Lancets (OneTouch Delica Plus [id number]) MISC USE TO TEST 3 TIMES DAILY, Normal      levothyroxine 50 mcg tablet Take 1 tablet (50 mcg total) by mouth daily, Starting Wed 11/30/2022, Normal      losartan (COZAAR) 25 mg tablet Take 1 tablet (25 mg total) by mouth daily, Starting Wed 11/30/2022, Normal      naloxone (NARCAN) 4 mg/0 1 mL nasal spray Administer 1 spray into a nostril   If breathing does not return to normal or if breathing difficulty resumes after 2-3 minutes, give another dose in the other nostril using a new spray , Normal      nitroglycerin (NITROSTAT) 0 4 mg SL tablet Place 1 tablet (0 4 mg total) under the tongue every 5 (five) minutes as needed for chest pain, Starting Wed 11/30/2022, Normal nystatin (MYCOSTATIN) powder Apply topically 2 (two) times a day, Starting Wed 11/30/2022, Normal      ondansetron (ZOFRAN-ODT) 4 mg disintegrating tablet Take 1 tablet (4 mg total) by mouth every 8 (eight) hours as needed for nausea or vomiting, Starting Thu 9/1/2022, Normal      OneTouch Ultra test strip USE 1 EACH DAILY USE AS INSTRUCTED, Starting Mon 2/21/2022, Normal      oxyCODONE (ROXICODONE) 5 immediate release tablet Take 1 tablet (5 mg total) by mouth every 12 (twelve) hours as needed for severe pain Max Daily Amount: 10 mg, Starting Wed 11/30/2022, Normal      pantoprazole (PROTONIX) 40 mg tablet Take 1 tablet (40 mg total) by mouth daily, Starting Wed 11/30/2022, Normal      simethicone (MYLICON,GAS-X) 182 MG capsule Take 1 capsule (180 mg total) by mouth every 8 (eight) hours, Starting Wed 11/30/2022, Normal      tiZANidine (ZANAFLEX) 4 mg tablet Take 1 tablet (4 mg total) by mouth every 8 (eight) hours as needed for muscle spasms, Starting Wed 11/30/2022, Normal      Torsemide 40 MG TABS Take 80 mg by mouth daily, Starting Wed 11/30/2022, Normal             No discharge procedures on file      PDMP Review       Value Time User    PDMP Reviewed  Yes 11/30/2022  5:19 PM Jono Anthony, [facility] Provider  Electronically Signed by           Sharonda Oro DO  12/12/22 5404

## 2022-12-11 NOTE — DISCHARGE INSTRUCTIONS
Nothing to eat or drink after midnight, however, you can take your morning medications with sips of water  The Interventional Radiology Department will contact you in the morning to let you know when you need to come in to have your dialysis access replaced  Return for any trouble breathing, persistent vomiting, fever more than 101, worsening symptoms or for any concerns

## 2022-12-12 ENCOUNTER — HOSPITAL ENCOUNTER (OUTPATIENT)
Facility: HOSPITAL | Age: 62
Setting detail: OBSERVATION
Discharge: HOME | End: 2022-12-13
Attending: EMERGENCY MEDICINE | Admitting: STUDENT IN AN ORGANIZED HEALTH CARE EDUCATION/TRAINING PROGRAM

## 2022-12-12 ENCOUNTER — APPOINTMENT (OUTPATIENT)
Dept: RADIOLOGY | Facility: HOSPITAL | Age: 62
End: 2022-12-12
Attending: RADIOLOGY

## 2022-12-12 ENCOUNTER — APPOINTMENT (OUTPATIENT)
Dept: CT IMAGING | Facility: HOSPITAL | Age: 62
End: 2022-12-12

## 2022-12-12 ENCOUNTER — TELEPHONE (OUTPATIENT)
Dept: UROLOGY | Facility: MEDICAL CENTER | Age: 62
End: 2022-12-12

## 2022-12-12 DIAGNOSIS — Z99.2 ESRD ON HEMODIALYSIS (HCC): ICD-10-CM

## 2022-12-12 DIAGNOSIS — Z93.59 CHRONIC SUPRAPUBIC CATHETER (HCC): ICD-10-CM

## 2022-12-12 DIAGNOSIS — Z78.9 PROBLEM WITH VASCULAR ACCESS: Primary | ICD-10-CM

## 2022-12-12 DIAGNOSIS — N18.6 ESRD ON HEMODIALYSIS (HCC): ICD-10-CM

## 2022-12-12 LAB
ANION GAP SERPL CALCULATED.3IONS-SCNC: 11 MMOL/L (ref 4–13)
BASOPHILS # BLD AUTO: 0.05 THOUSANDS/ÂΜL (ref 0–0.1)
BASOPHILS NFR BLD AUTO: 1 % (ref 0–1)
BUN SERPL-MCNC: 67 MG/DL (ref 5–25)
CALCIUM SERPL-MCNC: 9.6 MG/DL (ref 8.3–10.1)
CHLORIDE SERPL-SCNC: 100 MMOL/L (ref 96–108)
CO2 SERPL-SCNC: 28 MMOL/L (ref 21–32)
CREAT SERPL-MCNC: 3.91 MG/DL (ref 0.6–1.3)
EOSINOPHIL # BLD AUTO: 0.12 THOUSAND/ÂΜL (ref 0–0.61)
EOSINOPHIL NFR BLD AUTO: 1 % (ref 0–6)
ERYTHROCYTE [DISTWIDTH] IN BLOOD BY AUTOMATED COUNT: 14.4 % (ref 11.6–15.1)
GFR SERPL CREATININE-BSD FRML MDRD: 11 ML/MIN/1.73SQ M
GLUCOSE SERPL-MCNC: 136 MG/DL (ref 65–140)
GLUCOSE SERPL-MCNC: 260 MG/DL (ref 65–140)
HCT VFR BLD AUTO: 35.6 % (ref 34.8–46.1)
HGB BLD-MCNC: 11.7 G/DL (ref 11.5–15.4)
IMM GRANULOCYTES # BLD AUTO: 0.02 THOUSAND/UL (ref 0–0.2)
IMM GRANULOCYTES NFR BLD AUTO: 0 % (ref 0–2)
LYMPHOCYTES # BLD AUTO: 2.02 THOUSANDS/ÂΜL (ref 0.6–4.47)
LYMPHOCYTES NFR BLD AUTO: 20 % (ref 14–44)
MCH RBC QN AUTO: 33.4 PG (ref 26.8–34.3)
MCHC RBC AUTO-ENTMCNC: 32.9 G/DL (ref 31.4–37.4)
MCV RBC AUTO: 102 FL (ref 82–98)
MONOCYTES # BLD AUTO: 0.85 THOUSAND/ÂΜL (ref 0.17–1.22)
MONOCYTES NFR BLD AUTO: 8 % (ref 4–12)
NEUTROPHILS # BLD AUTO: 7.3 THOUSANDS/ÂΜL (ref 1.85–7.62)
NEUTS SEG NFR BLD AUTO: 70 % (ref 43–75)
NRBC BLD AUTO-RTO: 0 /100 WBCS
PLATELET # BLD AUTO: 263 THOUSANDS/UL (ref 149–390)
PMV BLD AUTO: 9.3 FL (ref 8.9–12.7)
POTASSIUM SERPL-SCNC: 5.2 MMOL/L (ref 3.5–5.3)
RBC # BLD AUTO: 3.5 MILLION/UL (ref 3.81–5.12)
SODIUM SERPL-SCNC: 139 MMOL/L (ref 135–147)
WBC # BLD AUTO: 10.36 THOUSAND/UL (ref 4.31–10.16)

## 2022-12-12 RX ORDER — FENTANYL CITRATE 50 UG/ML
INJECTION, SOLUTION INTRAMUSCULAR; INTRAVENOUS AS NEEDED
Status: COMPLETED | OUTPATIENT
Start: 2022-12-12 | End: 2022-12-12

## 2022-12-12 RX ORDER — OXYCODONE HYDROCHLORIDE 5 MG/1
5 TABLET ORAL EVERY 12 HOURS PRN
Status: DISCONTINUED | OUTPATIENT
Start: 2022-12-12 | End: 2022-12-13 | Stop reason: HOSPADM

## 2022-12-12 RX ORDER — CITALOPRAM 20 MG/1
40 TABLET ORAL DAILY
Status: DISCONTINUED | OUTPATIENT
Start: 2022-12-13 | End: 2022-12-13 | Stop reason: HOSPADM

## 2022-12-12 RX ORDER — HEPARIN SODIUM 5000 [USP'U]/ML
5000 INJECTION, SOLUTION INTRAVENOUS; SUBCUTANEOUS EVERY 8 HOURS SCHEDULED
Status: DISCONTINUED | OUTPATIENT
Start: 2022-12-12 | End: 2022-12-13 | Stop reason: HOSPADM

## 2022-12-12 RX ORDER — CEFAZOLIN SODIUM 2 G/50ML
SOLUTION INTRAVENOUS
Status: COMPLETED | OUTPATIENT
Start: 2022-12-12 | End: 2022-12-12

## 2022-12-12 RX ORDER — TIZANIDINE 4 MG/1
4 TABLET ORAL EVERY 8 HOURS PRN
Status: DISCONTINUED | OUTPATIENT
Start: 2022-12-12 | End: 2022-12-13 | Stop reason: HOSPADM

## 2022-12-12 RX ORDER — PANTOPRAZOLE SODIUM 40 MG/1
40 TABLET, DELAYED RELEASE ORAL
Status: DISCONTINUED | OUTPATIENT
Start: 2022-12-13 | End: 2022-12-13 | Stop reason: HOSPADM

## 2022-12-12 RX ORDER — ASPIRIN 81 MG/1
81 TABLET ORAL DAILY
Status: DISCONTINUED | OUTPATIENT
Start: 2022-12-13 | End: 2022-12-13 | Stop reason: HOSPADM

## 2022-12-12 RX ORDER — INSULIN LISPRO 100 [IU]/ML
10 INJECTION, SOLUTION INTRAVENOUS; SUBCUTANEOUS
Status: DISCONTINUED | OUTPATIENT
Start: 2022-12-12 | End: 2022-12-13 | Stop reason: HOSPADM

## 2022-12-12 RX ORDER — LOSARTAN POTASSIUM 25 MG/1
25 TABLET ORAL DAILY
Status: DISCONTINUED | OUTPATIENT
Start: 2022-12-13 | End: 2022-12-13 | Stop reason: HOSPADM

## 2022-12-12 RX ORDER — CARVEDILOL 25 MG/1
25 TABLET ORAL 2 TIMES DAILY WITH MEALS
Status: DISCONTINUED | OUTPATIENT
Start: 2022-12-12 | End: 2022-12-13 | Stop reason: HOSPADM

## 2022-12-12 RX ORDER — CLOPIDOGREL BISULFATE 75 MG/1
75 TABLET ORAL DAILY
Status: DISCONTINUED | OUTPATIENT
Start: 2022-12-13 | End: 2022-12-13 | Stop reason: HOSPADM

## 2022-12-12 RX ORDER — ATORVASTATIN CALCIUM 40 MG/1
40 TABLET, FILM COATED ORAL
Status: DISCONTINUED | OUTPATIENT
Start: 2022-12-12 | End: 2022-12-13 | Stop reason: HOSPADM

## 2022-12-12 RX ORDER — INSULIN LISPRO 100 [IU]/ML
1-5 INJECTION, SOLUTION INTRAVENOUS; SUBCUTANEOUS
Status: DISCONTINUED | OUTPATIENT
Start: 2022-12-12 | End: 2022-12-13 | Stop reason: HOSPADM

## 2022-12-12 RX ORDER — OLANZAPINE 5 MG/1
5 TABLET ORAL
Status: DISCONTINUED | OUTPATIENT
Start: 2022-12-12 | End: 2022-12-13 | Stop reason: HOSPADM

## 2022-12-12 RX ORDER — TORSEMIDE 20 MG/1
80 TABLET ORAL DAILY
Status: DISCONTINUED | OUTPATIENT
Start: 2022-12-13 | End: 2022-12-13 | Stop reason: HOSPADM

## 2022-12-12 RX ORDER — INSULIN GLARGINE 100 [IU]/ML
20 INJECTION, SOLUTION SUBCUTANEOUS EVERY 12 HOURS SCHEDULED
Status: DISCONTINUED | OUTPATIENT
Start: 2022-12-12 | End: 2022-12-13 | Stop reason: HOSPADM

## 2022-12-12 RX ORDER — ISOSORBIDE MONONITRATE 30 MG/1
30 TABLET, EXTENDED RELEASE ORAL EVERY EVENING
Status: DISCONTINUED | OUTPATIENT
Start: 2022-12-12 | End: 2022-12-13 | Stop reason: HOSPADM

## 2022-12-12 RX ADMIN — INSULIN LISPRO 2 UNITS: 100 INJECTION, SOLUTION INTRAVENOUS; SUBCUTANEOUS at 18:46

## 2022-12-12 RX ADMIN — FENTANYL CITRATE 50 MCG: 50 INJECTION INTRAMUSCULAR; INTRAVENOUS at 17:19

## 2022-12-12 RX ADMIN — CARVEDILOL 25 MG: 25 TABLET, FILM COATED ORAL at 18:40

## 2022-12-12 RX ADMIN — INSULIN GLARGINE 20 UNITS: 100 INJECTION, SOLUTION SUBCUTANEOUS at 20:15

## 2022-12-12 RX ADMIN — HEPARIN SODIUM 5000 UNITS: 5000 INJECTION INTRAVENOUS; SUBCUTANEOUS at 18:45

## 2022-12-12 RX ADMIN — OLANZAPINE 5 MG: 5 TABLET, FILM COATED ORAL at 21:11

## 2022-12-12 RX ADMIN — FENTANYL CITRATE 50 MCG: 50 INJECTION INTRAMUSCULAR; INTRAVENOUS at 17:37

## 2022-12-12 RX ADMIN — INSULIN LISPRO 10 UNITS: 100 INJECTION, SOLUTION INTRAVENOUS; SUBCUTANEOUS at 18:46

## 2022-12-12 RX ADMIN — CEFAZOLIN SODIUM 2000 MG: 2 SOLUTION INTRAVENOUS at 17:16

## 2022-12-12 RX ADMIN — ISOSORBIDE MONONITRATE 30 MG: 30 TABLET, EXTENDED RELEASE ORAL at 18:40

## 2022-12-12 RX ADMIN — ATORVASTATIN CALCIUM 40 MG: 40 TABLET, FILM COATED ORAL at 18:40

## 2022-12-12 RX ADMIN — OXYCODONE 5 MG: 5 TABLET ORAL at 23:51

## 2022-12-12 NOTE — TELEPHONE ENCOUNTER
Returned call to patient  Patient reports 2-3 months ago and was in [facility]  She has been home alittle over a month back to [place]  Previously seen [facility] to catheter changes and care  SPT was placed in January of february of this year  Patient went to ed yesterday for dialysis port came out  SPT is draining urine , clear yellow cloudy in color  No fevers chills or pain  Orders for Care [facility] are dated 8/26/22 in Epic from pcp  Dax Feng 49 spoke with Elayne Vera   Was advised that her last spt change was 8/14/22 and she was discharged 8/25/22 from their service   They did advised that the hospital did not send another referral to restart their service  They can restart patient service they would need  Last office note, H+ P , order from our practice  Patient has not been seen since 5/20/21 in office  Since patient has not been seen in a year scheduled patient for office visit for 12/13/22 CRNp and RN visit following for spt change  They will need orders for Care [facility]     Fax number is [phone]

## 2022-12-12 NOTE — Clinical Note
Case was discussed with Maria E Marx and the patient's admission status was agreed to be Admission Status: observation status to the service of Dr Maria E Marx

## 2022-12-12 NOTE — ASSESSMENT & PLAN NOTE
Lab Results   Component Value Date    HGBA1C 8 1 (H) 08/26/2022     Continue home insulin regimen  Decrease Lantus to 20 units every 12, Humalog 10 units 3 times daily with meals

## 2022-12-12 NOTE — ED PROVIDER NOTES
Labs reviewed, patient with no significant elevation in white count, has remained afebrile in ED  Patient noted to be awake and alert, ambulating in ED without difficulty and in no distress  Patient states she feels well to be discharged, will follow up with interventional radiology tomorrow for dialysis catheter placement, also discussed with patient follow-up with urology for evaluation and further management of her suprapubic catheter       Tiffany Bond MD  12/11/22 2031

## 2022-12-12 NOTE — TELEPHONE ENCOUNTER
Patient of Dr Matilde Jauregui at [address] [facility]    Patient called stating she was in the ER yesterday and they were not able to change her catheter yesterday due to a lot of calcium sediment buildup and they were not able to pull it out  She had no idea she needed to have it changed once a month  Its been three months since the last change  She stated she was in [place] and they changed it there but now she is back in [place] and she has not had it changed since then  She wants to know how to proceed      Patient can be reached at [phone]

## 2022-12-12 NOTE — PROCEDURES
Central Line    Date/Time: 12/12/2022 5:51 PM  Performed by: Kurtis Kuhn MD  Authorized by: Kurtis Kuhn MD     Patient location:  [facility] protocol:     Procedure explained and questions answered to patient or proxy's satisfaction: yes      Relevant documents present and verified: yes      Immediately prior to procedure, a time out was called: yes      Patient identity confirmed:  Verbally with patient, arm band and provided demographic data  Pre-procedure details:     Hand hygiene: Hand hygiene performed prior to insertion      Sterile barrier technique: All elements of maximal sterile technique followed      Skin preparation:  ChloraPrep    Skin preparation agent: Skin preparation agent completely dried prior to procedure    Anesthesia (see MAR for exact dosages): Anesthesia method:  Local infiltration    Local anesthetic:  Lidocaine 1% WITH epi  Procedure details:     Location:  Right internal jugular    Vessel type: vein      Approach: percutaneous technique used      Catheter type:  Double lumen    Catheter size:  15 5 Fr    Successful placement: yes      Vessel of catheter tip end:  Svc  Post-procedure details:     Post-procedure:  Dressing applied and line sutured    Assessment:  Blood return through all ports and placement verified by x-ray    Post-procedure complications: none      Patient tolerance of procedure:   Tolerated well, no immediate complications  Comments:      24cm HD cath replaced through previous tract

## 2022-12-12 NOTE — TELEMEDICINE
e-Consult (IPC)  - Interventional Radiology  Ankit Moralez 63 y o  female MRN: [id number]  Unit/Bed#: ED 29 Encounter: [id number]          Interventional Radiology has been consulted to evaluate Ankit Moralez    IP Consult to IR  Consult performed by: Luis Miguel Nava MD  Consult ordered by: Graeme Browning MD        12/12/22    Assessment/Recommendation:   63year-old on hemodialysis  Tunneled hemodialysis catheter fell out yesterday  Presented to the ER  Consult for replacement  Right IJ tunneled hemodialysis catheter was replaced under fluoroscopic guidance through previous tract  Okay to use immediately  11-20 minutes, >50% of the total time devoted to medical consultative verbal/EMR discussion between providers  Written report will be generated in the EMR  Thank you for allowing Interventional Radiology to participate in the care of Ankit Moralez  Please don't hesitate to call or TigerText us with any questions       Dara Pinedo MD

## 2022-12-12 NOTE — TELEPHONE ENCOUNTER
Change in provider in [facility] location   Left voice message for patient on machine     "If patient calls back please confirm appt for 12/14/22 @ 2:15pm and RN visit at 2:30pm for SPT change "

## 2022-12-12 NOTE — ASSESSMENT & PLAN NOTE
Patient presented with HD catheter malfunction  IR unable to place in the ED, admitted the place for tomorrow  N p o  midnight

## 2022-12-12 NOTE — SEDATION DOCUMENTATION
Patient states she came to the ed yesterday because she noticed her perm cath "came out" and was laying across her belly when she lifted her shirt  Patient states no one was able to replace it yesterday (Sunday) and there were no appointments for today (Monday) or tomorrow (Tuesday), she states she was told to come back to the ed to be put on the schedule  Patient states perm cath has been in place since about 1/2021  Patient transported from ED to IR holding room via litter, she met with Dr Hernesto Wing and the procedure that is to be attempted today was discussed  We are unable to provide sedation today as the ED provided patient was with full dinner tray

## 2022-12-12 NOTE — ASSESSMENT & PLAN NOTE
Wt Readings from Last 3 Encounters:   12/11/22 105 kg (232 lb 2 3 oz)   11/29/22 112 kg (246 lb 4 8 oz)   09/01/22 117 kg (258 lb 4 8 oz)     Currently euvolemic, continue torsemide, controlled with dialysis

## 2022-12-12 NOTE — H&P
[facility]  H&P- Eric Hartmann 1960, 63 y o  female MRN: [id number]  Unit/Bed#: ED 28 Encounter: [id number]  Primary Care Provider: CHERELLE Galarza   Date and time admitted to hospital: 12/12/2022  2:21 PM    * Problem with vascular access  Assessment & Plan  Patient presented with HD catheter malfunction  IR unable to place in the ED, admitted the place for tomorrow  N p o  midnight    Suprapubic catheter [facility])  Assessment & Plan  Suprapubic catheter last changed 3 months ago, also urology for extreme    Chronic combined systolic and diastolic congestive heart failure ([address] )  Assessment & Plan  Wt Readings from Last 3 Encounters:   12/11/22 105 kg (232 lb 2 3 oz)   11/29/22 112 kg (246 lb 4 8 oz)   09/01/22 117 kg (258 lb 4 8 oz)     Currently euvolemic, continue torsemide, controlled with dialysis          Type 2 diabetes mellitus with stage 5 chronic kidney disease not on chronic dialysis, with long-term current use of insulin ([facility] )  Assessment & Plan  Lab Results   Component Value Date    HGBA1C 8 1 (H) 08/26/2022     Continue home insulin regimen  Decrease Lantus to 20 units every 12, Humalog 10 units 3 times daily with meals      VTE Prophylaxis: Heparin  / sequential compression device   Code Status: FC  POLST: There is no POLST form on file for this patient (pre-hospital)  Discussion with family: patient    Anticipated Length of Stay:  Patient will be admitted on an Observation basis with an anticipated length of stay of 2 midnights  Justification for Hospital Stay: IR for HD cath placement, dialysis    Total Time for Visit, including Counseling / Coordination of Care: 45 minutes  Greater than 50% of this total time spent on direct patient counseling and coordination of care  Chief Complaint:   Vascular Access    History of Present Illness:    Eric Hartmann is a 63 y o  female who presents with HD catheter malfunction    Patient with history of ESRD, suprapubic catheter, type 2 diabetes and hypertension  Presented in for HD catheter malfunction, reportedly fell out this weekend  She typically gets dialysis Tuesday Thursday Saturday  No complaints at this time  Was supposed to get HD cath exchange in ER and discharged home, but IR was unable to get it done and recommended admission for tomorrow  Suprapubic catheter last changed 3 months ago, urology recommended that patient were to stay to consult him and will have it exchange inpatient  Review of Systems:    Review of Systems   Constitutional: Negative for activity change, appetite change, chills and fever  HENT: Negative for congestion, facial swelling, sinus pain and sore throat  Respiratory: Negative for cough, shortness of breath and wheezing  Cardiovascular: Negative for chest pain, palpitations and leg swelling  Gastrointestinal: Negative for abdominal distention, abdominal pain, constipation, diarrhea, nausea and vomiting  Endocrine: Negative for cold intolerance, heat intolerance, polydipsia, polyphagia and polyuria  Genitourinary: Negative for dysuria, flank pain and urgency  Skin: Negative for color change and pallor  Neurological: Negative for dizziness, syncope, weakness, light-headedness and headaches  Psychiatric/Behavioral: Negative for agitation, confusion and dysphoric mood         Past Medical and Surgical History:     Past Medical History:   Diagnosis Date   • Abnormal liver function    • Anemia    • Anxiety    • Arthritis    • Chronic kidney disease     stage 3   • Chronic narcotic dependence ([address] )    • Chronic pain disorder     lower back, hands , neck and knees   • Coronary artery disease    • Depression    • Diabetes mellitus ([address] )    • Elevated troponin 2/11/2022   • GERD (gastroesophageal reflux disease)     no meds at present   • Heart murmur     murmur   • Hyperlipidemia    • Hypertension    • Neurogenic bladder    • Open toe wound 12/2020    right big toe open calus but is dry at present   • Renal disorder    • Shortness of breath     exertion   • Sleep apnea     doesn't use cpap   • Suprapubic catheter [facility])        Past Surgical History:   Procedure Laterality Date   • AMPUTATION     • ANGIOPLASTY  2017 5   • BREAST EXCISIONAL BIOPSY Left    • BREAST SURGERY     • CARDIAC CATHETERIZATION     • CARDIAC CATHETERIZATION N/A 7/1/2022    Procedure: Cardiac catheterization;  Surgeon: Gerri Burr MD;  Location: [facility]ARD[facility]; Service: Cardiology   • CARDIAC CATHETERIZATION N/A 7/1/2022    Procedure: Cardiac pci;  Surgeon: Gerri Burr MD;  Location: [facility]; Service: Cardiology   • CARPAL TUNNEL RELEASE Bilateral    • CERVICAL FUSION     • HYSTERECTOMY  2008   • IR SUPRAPUBIC CATHETER PLACEMENT  6/15/2021   • IR TUNNELED DIALYSIS CATHETER PLACEMENT  7/15/2022   • KNEE SURGERY     • OOPHORECTOMY  2008   • HI EXC B9 LESION MRGN XCP SK TG S/N/H/F/G 3 1-4 0CM N/A 12/21/2020    Procedure: EXCISION SEBACEOUS CYST X 5 SCALP;  Surgeon: Rocío Lr MD;  Location: [address] [place] [address];  Service: General   • TOE AMPUTATION Left    • TRIGGER FINGER RELEASE Right     4th Finger       Meds/Allergies:    Prior to Admission medications    Medication Sig Start Date End Date Taking?  Authorizing Provider   OLANZapine (ZyPREXA) 5 mg tablet TAKE 1 TABLET BY MOUTH AT BEDTIME 11/28/22   CHERELLE Couch   allopurinol (ZYLOPRIM) 300 mg tablet Take 1 tablet (300 mg total) by mouth daily 11/30/22   CHERELLE Couch   aspirin (ECOTRIN LOW STRENGTH) 81 mg EC tablet Take 1 tablet (81 mg total) by mouth daily 11/30/22   CHERELLE Couch   atorvastatin (LIPITOR) 40 mg tablet Take 1 tablet (40 mg total) by mouth daily 11/30/22   CHERELLE Couch   carvedilol (COREG) 25 mg tablet Take 1 tablet (25 mg total) by mouth 2 (two) times a day with meals 11/30/22   CHERELLE Couch   citalopram (CeleXA) 40 mg tablet Take 1 tablet (40 mg total) by mouth daily 11/30/22   Preethi Padgett CHERELLE Bright   clopidogrel (PLAVIX) 75 mg tablet Take 1 tablet (75 mg total) by mouth daily 11/30/22   CHERELLE Richardson   docusate sodium (COLACE) 50 mg capsule Take 1 capsule (50 mg total) by mouth 2 (two) times a day 11/30/22   CHERELLE Richardson   Dulaglutide (Trulicity) 1 5 SJ/7 7BV SOPN Inject 0 5 mL (1 5 mg total) under the skin once a week 11/30/22   CHERELLE Richardson   ferrous sulfate 325 (65 Fe) mg tablet Take 1 tablet (325 mg total) by mouth every other day 9/1/22   CHERELLE Richardson   Incontinence Supplies (Urinary Drainage Bag) MISC Attach one bag to suprapubic catheter as needed 10/31/22   Historical Provider, MD   Incontinence Supplies (Urinary Leg Bag) KIT Attach one bag to suprapubic catheter morales as needed 10/31/22   Historical Provider, MD   Insulin Glargine Solostar (Lantus SoloStar) 100 UNIT/ML SOPN Inject 0 25 mL (25 Units total) under the skin every 12 (twelve) hours 11/29/22   CHERELLE Richardson   insulin lispro (HumaLOG) 100 units/mL injection pen Inject 10 Units under the skin 3 (three) times a day with meals 11/30/22   CHERELLE Richardson   Insulin Pen Needle (BD Pen Needle Micro U/F) 32G X 6 MM MISC Use 5 (five) times a day 11/30/22   CHERELLE Richardson   Insulin Syringe-Needle U-100 (BD Veo Insulin Syringe U/F) 31G X 15/64" 0 5 ML MISC Inject under the skin 3 (three) times a day 3/29/22   CHERELLE Richardson   isosorbide mononitrate (IMDUR) 30 mg 24 hr tablet Take 1 tablet (30 mg total) by mouth every evening 11/30/22   CHERELLE Richardson   ketoconazole (NIZORAL) 2 % cream Apply to suprapubic catheter site twice daily   10/6/22   Historical Provider, MD   Lancets (OneTouch Delica Plus [id number]) MISC USE TO TEST 3 TIMES DAILY 11/30/22   CHERELLE Richardson   levothyroxine 50 mcg tablet Take 1 tablet (50 mcg total) by mouth daily 11/30/22   CHERELLE Richardson   losartan (COZAAR) 25 mg tablet Take 1 tablet (25 mg total) by mouth daily 11/30/22   CHERELLE Richardson naloxone (NARCAN) 4 mg/0 1 mL nasal spray Administer 1 spray into a nostril  If breathing does not return to normal or if breathing difficulty resumes after 2-3 minutes, give another dose in the other nostril using a new spray  9/1/22   CHERELLE Ortiz   nitroglycerin (NITROSTAT) 0 4 mg SL tablet Place 1 tablet (0 4 mg total) under the tongue every 5 (five) minutes as needed for chest pain 11/30/22   CHERELLE Ortiz   nystatin (MYCOSTATIN) powder Apply topically 2 (two) times a day 11/30/22   CHERELLE Ortiz   ondansetron (ZOFRAN-ODT) 4 mg disintegrating tablet Take 1 tablet (4 mg total) by mouth every 8 (eight) hours as needed for nausea or vomiting 9/1/22   CHERELLE Ortiz   OneTouch Ultra test strip USE 1 EACH DAILY USE AS INSTRUCTED 2/21/22   CHERELLE Ortiz   oxyCODONE (ROXICODONE) 5 immediate release tablet Take 1 tablet (5 mg total) by mouth every 12 (twelve) hours as needed for severe pain Max Daily Amount: 10 mg 11/30/22   CHERELLE Ortiz   pantoprazole (PROTONIX) 40 mg tablet Take 1 tablet (40 mg total) by mouth daily 11/30/22   CHERELLE Ortiz   simethicone (MYLICON,GAS-X) 158 MG capsule Take 1 capsule (180 mg total) by mouth every 8 (eight) hours 11/30/22   CHERELLE Ortiz   tiZANidine (ZANAFLEX) 4 mg tablet Take 1 tablet (4 mg total) by mouth every 8 (eight) hours as needed for muscle spasms 11/30/22   CHERELLE Ortiz   Torsemide 40 MG TABS Take 80 mg by mouth daily 11/30/22   CHERELLE Ortiz   oxygen gas Inhale 2 L/min continuous  Indications: Respiratory Failure 1/1/20 6/11/22  Historical Provider, MD CURRY have reviewed home medications with patient personally  Allergies:    Allergies   Allergen Reactions   • Codeine      Other reaction(s): Nausea and Vomiting   • Latex Itching       Social History:      Marital Status:    Occupation:   Patient Pre-hospital Living Situation: home  Patient Pre-hospital Level of Mobility: ambulatory  Patient Pre-hospital Diet Restrictions: none  Substance Use History:   Social History     Substance and Sexual Activity   Alcohol Use Not Currently     Social History     Tobacco Use   Smoking Status Former   • Packs/day: 1 00   • Years: 35 00   • Pack years: 35 00   • Types: Cigarettes   • Quit date: 2012   • Years since quitting: 10 9   Smokeless Tobacco Never     Social History     Substance and Sexual Activity   Drug Use Never       Family History:    Family History   Problem Relation Age of Onset   • Stroke Father    • Heart disease Father    • No Known Problems Mother    • No Known Problems Sister    • No Known Problems Daughter    • No Known Problems Maternal Grandmother    • No Known Problems Maternal Grandfather    • No Known Problems Paternal Grandmother    • No Known Problems Paternal Grandfather    • No Known Problems Maternal Aunt    • No Known Problems Maternal Aunt    • No Known Problems Maternal Aunt    • No Known Problems Maternal Aunt    • No Known Problems Maternal Aunt    • No Known Problems Maternal Aunt    • No Known Problems Paternal Aunt        Physical Exam:     Vitals:   Blood Pressure: 158/67 (12/12/22 1321)  Pulse: 72 (12/12/22 1321)  Temperature: 98 3 °F (36 8 °C) (12/12/22 1321)  Respirations: 18 (12/12/22 1321)  SpO2: 99 % (12/12/22 1321)    Physical Exam  Vitals and nursing note reviewed  Constitutional:       Appearance: Normal appearance  She is obese  HENT:      Head: Normocephalic and atraumatic  Eyes:      General: No scleral icterus  Conjunctiva/sclera: Conjunctivae normal    Cardiovascular:      Rate and Rhythm: Normal rate and regular rhythm  Pulmonary:      Breath sounds: Normal breath sounds  No wheezing or rhonchi  Abdominal:      General: Bowel sounds are normal  There is no distension  Palpations: Abdomen is soft  Musculoskeletal:         General: No swelling  Right lower leg: No edema  Left lower leg: No edema     Skin:     General: Skin is warm and dry    Neurological:      General: No focal deficit present  Mental Status: She is alert  Mental status is at baseline  Additional Data:     Lab Results: I have personally reviewed pertinent reports  Results from last 7 days   Lab Units 12/12/22  1516   WBC Thousand/uL 10 36*   HEMOGLOBIN g/dL 11 7   HEMATOCRIT % 35 6   PLATELETS Thousands/uL 263   NEUTROS PCT % 70   LYMPHS PCT % 20   MONOS PCT % 8   EOS PCT % 1     Results from last 7 days   Lab Units 12/12/22  1516   SODIUM mmol/L 139   POTASSIUM mmol/L 5 2   CHLORIDE mmol/L 100   CO2 mmol/L 28   BUN mg/dL 67*   CREATININE mg/dL 3 91*   ANION GAP mmol/L 11   CALCIUM mg/dL 9 6   GLUCOSE RANDOM mg/dL 136                       Imaging: I have personally reviewed pertinent reports  IR tunneled dialysis catheter placement    (Results Pending)       EKG, Pathology, and Other Studies Reviewed on Admission:   · EKG: none    Allscripts / Epic Records Reviewed: Yes     ** Please Note: This note has been constructed using a voice recognition system   **

## 2022-12-12 NOTE — ED PROVIDER NOTES
History  Chief Complaint   Patient presents with   • Vascular Access Problem     States port fell out - seen yesterday for same  Was supposed to f/u with IR but did not  Also states having issues with urethral catheter, spoke with urology and told to come back to ED  61y F ESRD, Hemodialysis Tu, Th, Sat, oligouric w/ SPT  has been in/out of the area - most recently in [place] for ?2-3 months  had dialysis Saturday w/ no issues  reports chest wall access was "itchy" and at some point on Sunday the access "just fell out"  came here yesterday and case was discussed w/ Dr Kimmy Caba, nephrology who said to admit if IR couldn't replace her access today  was assured by on call IR she would be done today  in addition, c/o cloudy urine in SPT w/ concerns for UTI  hadn't been changed for 3m  Pt otherwise asymptomatic  Attempt x2 made in ED to change SPT but unsuccessful  Urology was okay w/ d/c if normal WBC for outpt management  Today, pt not contacted by IR until after  and told to return to the ED b/c would be unable to have procedure today/tomorrow  confirmed w/ Dr Sheila Elena, unlikely to get in today due to still having a lot of cases  d/w Urology - if admitted, they will be able to look at [facility] [place] and discuss options  Pt hasn't had anything to eat/drink today b/c was waiting for IR procedure, so notes decreased UOP  Denies f/c/s, no cp/pressure, no sob/rahman, no abd pain, no n/v/d  Pt always has fatigue/malaise which are unchanged        History provided by:  Patient and medical records   used: No    Medical Problem  Location:  Lost dialysis access  Quality:  See above  Severity:  Unable to specify  Onset quality:  Sudden  Duration:  1 day  Timing:  Constant  Progression:  Unchanged  Chronicity:  New  Context:  Esrd, hemodialysis  Relieved by:  N/a  Worsened by:  N/a  Ineffective treatments:  N/a  Associated symptoms: fatigue    Associated symptoms: no abdominal pain, no chest pain, no congestion, no fever, no nausea, no shortness of breath and no vomiting        Prior to Admission Medications   Prescriptions Last Dose Informant Patient Reported? Taking?    Dulaglutide (Trulicity) 1 5 KD/9 6QR SOPN   No No   Sig: Inject 0 5 mL (1 5 mg total) under the skin once a week   Incontinence Supplies (Urinary Drainage Bag) MISC   Yes No   Sig: Attach one bag to suprapubic catheter as needed   Incontinence Supplies (Urinary Leg Bag) KIT   Yes No   Sig: Attach one bag to suprapubic catheter morales as needed   Insulin Glargine Solostar (Lantus SoloStar) 100 UNIT/ML SOPN   No No   Sig: Inject 0 25 mL (25 Units total) under the skin every 12 (twelve) hours   Insulin Pen Needle (BD Pen Needle Micro U/F) 32G X 6 MM MISC   No No   Sig: Use 5 (five) times a day   Insulin Syringe-Needle U-100 (BD Veo Insulin Syringe U/F) 31G X 15/64" 0 5 ML MISC   No No   Sig: Inject under the skin 3 (three) times a day   Lancets (OneTouch Delica Plus NNEJJK20H) MISC   No No   Sig: USE TO TEST 3 TIMES DAILY   OLANZapine (ZyPREXA) 5 mg tablet   No No   Sig: TAKE 1 TABLET BY MOUTH AT BEDTIME   OneTouch Ultra test strip   No No   Sig: USE 1 EACH DAILY USE AS INSTRUCTED   Torsemide 40 MG TABS   No No   Sig: Take 80 mg by mouth daily   allopurinol (ZYLOPRIM) 300 mg tablet   No No   Sig: Take 1 tablet (300 mg total) by mouth daily   aspirin (ECOTRIN LOW STRENGTH) 81 mg EC tablet   No No   Sig: Take 1 tablet (81 mg total) by mouth daily   atorvastatin (LIPITOR) 40 mg tablet   No No   Sig: Take 1 tablet (40 mg total) by mouth daily   carvedilol (COREG) 25 mg tablet   No No   Sig: Take 1 tablet (25 mg total) by mouth 2 (two) times a day with meals   citalopram (CeleXA) 40 mg tablet   No No   Sig: Take 1 tablet (40 mg total) by mouth daily   clopidogrel (PLAVIX) 75 mg tablet   No No   Sig: Take 1 tablet (75 mg total) by mouth daily   docusate sodium (COLACE) 50 mg capsule   No No   Sig: Take 1 capsule (50 mg total) by mouth 2 (two) times a day ferrous sulfate 325 (65 Fe) mg tablet   No No   Sig: Take 1 tablet (325 mg total) by mouth every other day   insulin lispro (HumaLOG) 100 units/mL injection pen   No No   Sig: Inject 10 Units under the skin 3 (three) times a day with meals   isosorbide mononitrate (IMDUR) 30 mg 24 hr tablet   No No   Sig: Take 1 tablet (30 mg total) by mouth every evening   ketoconazole (NIZORAL) 2 % cream   Yes No   Sig: Apply to suprapubic catheter site twice daily  levothyroxine 50 mcg tablet   No No   Sig: Take 1 tablet (50 mcg total) by mouth daily   losartan (COZAAR) 25 mg tablet   No No   Sig: Take 1 tablet (25 mg total) by mouth daily   naloxone (NARCAN) 4 mg/0 1 mL nasal spray   No No   Sig: Administer 1 spray into a nostril  If breathing does not return to normal or if breathing difficulty resumes after 2-3 minutes, give another dose in the other nostril using a new spray     nitroglycerin (NITROSTAT) 0 4 mg SL tablet   No No   Sig: Place 1 tablet (0 4 mg total) under the tongue every 5 (five) minutes as needed for chest pain   nystatin (MYCOSTATIN) powder   No No   Sig: Apply topically 2 (two) times a day   ondansetron (ZOFRAN-ODT) 4 mg disintegrating tablet   No No   Sig: Take 1 tablet (4 mg total) by mouth every 8 (eight) hours as needed for nausea or vomiting   oxyCODONE (ROXICODONE) 5 immediate release tablet   No No   Sig: Take 1 tablet (5 mg total) by mouth every 12 (twelve) hours as needed for severe pain Max Daily Amount: 10 mg   pantoprazole (PROTONIX) 40 mg tablet   No No   Sig: Take 1 tablet (40 mg total) by mouth daily   simethicone (MYLICON,GAS-X) 894 MG capsule   No No   Sig: Take 1 capsule (180 mg total) by mouth every 8 (eight) hours   tiZANidine (ZANAFLEX) 4 mg tablet   No No   Sig: Take 1 tablet (4 mg total) by mouth every 8 (eight) hours as needed for muscle spasms      Facility-Administered Medications: None       Past Medical History:   Diagnosis Date   • Abnormal liver function    • Anemia    • Anxiety    • Arthritis    • Chronic kidney disease     stage 3   • Chronic narcotic dependence (HCC)    • Chronic pain disorder     lower back, hands , neck and knees   • Coronary artery disease    • Depression    • Diabetes mellitus ([address] )    • Elevated troponin 2/11/2022   • GERD (gastroesophageal reflux disease)     no meds at present   • Heart murmur     murmur   • Hyperlipidemia    • Hypertension    • Neurogenic bladder    • Open toe wound 12/2020    right big toe open calus but is dry at present   • Renal disorder    • Shortness of breath     exertion   • Sleep apnea     doesn't use cpap   • Suprapubic catheter [facility])        Past Surgical History:   Procedure Laterality Date   • AMPUTATION     • ANGIOPLASTY  2017    5   • BREAST EXCISIONAL BIOPSY Left    • BREAST SURGERY     • CARDIAC CATHETERIZATION     • CARDIAC CATHETERIZATION N/A 7/1/2022    Procedure: Cardiac catheterization;  Surgeon: Rolando Baumann MD;  Location: [facility]; Service: Cardiology   • CARDIAC CATHETERIZATION N/A 7/1/2022    Procedure: Cardiac pci;  Surgeon: Rolando Baumann MD;  Location: [facility];   Service: Cardiology   • CARPAL TUNNEL RELEASE Bilateral    • CERVICAL FUSION     • HYSTERECTOMY  2008   • IR SUPRAPUBIC CATHETER PLACEMENT  6/15/2021   • IR TUNNELED DIALYSIS CATHETER PLACEMENT  7/15/2022   • KNEE SURGERY     • OOPHORECTOMY  2008   • IA EXC B9 LESION MRGN XCP SK TG S/N/H/F/G 3 1-4 0CM N/A 12/21/2020    Procedure: EXCISION SEBACEOUS CYST X 5 SCALP;  Surgeon: Choco Chinchilla MD;  Location: [address] [place] [address];  Service: General   • TOE AMPUTATION Left    • TRIGGER FINGER RELEASE Right     4th Finger       Family History   Problem Relation Age of Onset   • Stroke Father    • Heart disease Father    • No Known Problems Mother    • No Known Problems Sister    • No Known Problems Daughter    • No Known Problems Maternal Grandmother    • No Known Problems Maternal Grandfather    • No Known Problems Paternal Grandmother    • No Known Problems Paternal Grandfather    • No Known Problems Maternal Aunt    • No Known Problems Maternal Aunt    • No Known Problems Maternal Aunt    • No Known Problems Maternal Aunt    • No Known Problems Maternal Aunt    • No Known Problems Maternal Aunt    • No Known Problems Paternal Aunt      I have reviewed and agree with the history as documented  E-Cigarette/Vaping   • E-Cigarette Use Never User      E-Cigarette/Vaping Substances   • Nicotine No    • THC No    • CBD No    • Flavoring No    • Other No    • Unknown No      Social History     Tobacco Use   • Smoking status: Former     Packs/day: 1 00     Years: 35 00     Pack years: 35 00     Types: Cigarettes     Quit date: 2012     Years since quitting: 10 9   • Smokeless tobacco: Never   Vaping Use   • Vaping Use: Never used   Substance Use Topics   • Alcohol use: Not Currently   • Drug use: Never       Review of Systems   Constitutional: Positive for fatigue  Negative for chills, diaphoresis and fever  HENT: Negative for congestion  Respiratory: Negative for chest tightness and shortness of breath  Cardiovascular: Negative for chest pain and palpitations  Gastrointestinal: Negative for abdominal pain, nausea and vomiting  Genitourinary: Positive for decreased urine volume  Skin: Negative for color change  Neurological: Negative for weakness and numbness  All other systems reviewed and are negative  Physical Exam  Physical Exam  Vitals and nursing note reviewed  Constitutional:       Appearance: Normal appearance  She is obese  HENT:      Mouth/Throat:      Mouth: Mucous membranes are moist    Eyes:      Conjunctiva/sclera: Conjunctivae normal    Cardiovascular:      Rate and Rhythm: Normal rate and regular rhythm  Pulmonary:      Effort: Pulmonary effort is normal       Breath sounds: Normal breath sounds  Comments: Former access site c/d/i  Abdominal:      Palpations: Abdomen is soft  Tenderness:  There is no abdominal tenderness  Musculoskeletal:         General: No tenderness  Cervical back: Normal range of motion  Skin:     General: Skin is warm  Findings: No erythema or rash  Neurological:      General: No focal deficit present  Mental Status: She is alert and oriented to person, place, and time     Psychiatric:         Mood and Affect: Mood normal          Vital Signs  ED Triage Vitals [12/12/22 1321]   Temperature Pulse Respirations Blood Pressure SpO2   98 3 °F (36 8 °C) 72 18 158/67 99 %      Temp src Heart Rate Source Patient Position - Orthostatic VS BP Location FiO2 (%)   -- Monitor Sitting Right arm --      Pain Score       --           Vitals:    12/12/22 1321   BP: 158/67   Pulse: 72   Patient Position - Orthostatic VS: Sitting         Visual Acuity      ED Medications  Medications   atorvastatin (LIPITOR) tablet 40 mg (has no administration in time range)   carvedilol (COREG) tablet 25 mg (has no administration in time range)   citalopram (CeleXA) tablet 40 mg (has no administration in time range)   clopidogrel (PLAVIX) tablet 75 mg (has no administration in time range)   aspirin (ECOTRIN LOW STRENGTH) EC tablet 81 mg (has no administration in time range)   insulin glargine (LANTUS) subcutaneous injection 20 Units 0 2 mL (has no administration in time range)   insulin lispro (HumaLOG) 100 units/mL subcutaneous injection 10 Units (has no administration in time range)   isosorbide mononitrate (IMDUR) 24 hr tablet 30 mg (has no administration in time range)   losartan (COZAAR) tablet 25 mg (has no administration in time range)   OLANZapine (ZyPREXA) tablet 5 mg (has no administration in time range)   oxyCODONE (ROXICODONE) IR tablet 5 mg (has no administration in time range)   pantoprazole (PROTONIX) EC tablet 40 mg (has no administration in time range)   tiZANidine (ZANAFLEX) tablet 4 mg (has no administration in time range)   torsemide (DEMADEX) tablet 80 mg (has no administration in time range) heparin (porcine) subcutaneous injection 5,000 Units (has no administration in time range)   insulin lispro (HumaLOG) 100 units/mL subcutaneous injection 1-5 Units (has no administration in time range)   ceFAZolin (ANCEF) IVPB (premix in dextrose) (2,000 mg Intravenous New Bag 12/12/22 1716)       Diagnostic Studies  Results Reviewed     Procedure Component Value Units Date/Time    Basic metabolic panel [[id number]]  (Abnormal) Collected: 12/12/22 1516    Lab Status: Final result Specimen: Blood from Arm, Left Updated: 12/12/22 1534     Sodium 139 mmol/L      Potassium 5 2 mmol/L      Chloride 100 mmol/L      CO2 28 mmol/L      ANION GAP 11 mmol/L      BUN 67 mg/dL      Creatinine 3 91 mg/dL      Glucose 136 mg/dL      Calcium 9 6 mg/dL      eGFR 11 ml/min/1 73sq m     Narrative:      [place] guidelines for Chronic Kidney Disease (CKD):   •  Stage 1 with normal or high GFR (GFR > 90 mL/min/1 73 square meters)  •  Stage 2 Mild CKD (GFR = 60-89 mL/min/1 73 square meters)  •  Stage 3A Moderate CKD (GFR = 45-59 mL/min/1 73 square meters)  •  Stage 3B Moderate CKD (GFR = 30-44 mL/min/1 73 square meters)  •  Stage 4 Severe CKD (GFR = 15-29 mL/min/1 73 square meters)  •  Stage 5 End Stage CKD (GFR <15 mL/min/1 73 square meters)  Note: GFR calculation is accurate only with a steady state creatinine    CBC and differential [[id number]]  (Abnormal) Collected: 12/12/22 1516    Lab Status: Final result Specimen: Blood from Arm, Left Updated: 12/12/22 1523     WBC 10 36 Thousand/uL      RBC 3 50 Million/uL      Hemoglobin 11 7 g/dL      Hematocrit 35 6 %       fL      MCH 33 4 pg      MCHC 32 9 g/dL      RDW 14 4 %      MPV 9 3 fL      Platelets 082 Thousands/uL      nRBC 0 /100 WBCs      Neutrophils Relative 70 %      Immat GRANS % 0 %      Lymphocytes Relative 20 %      Monocytes Relative 8 %      Eosinophils Relative 1 %      Basophils Relative 1 %      Neutrophils Absolute 7 30 Thousands/µL Immature Grans Absolute 0 02 Thousand/uL      Lymphocytes Absolute 2 02 Thousands/µL      Monocytes Absolute 0 85 Thousand/µL      Eosinophils Absolute 0 12 Thousand/µL      Basophils Absolute 0 05 Thousands/µL                  IR tunneled dialysis catheter placement    (Results Pending)              Procedures  Procedures  Conscious Sedation Assessment    Flowsheet Row Classification Score   ASA Scale Assessment 3-Severe systemic disease that results in functional limitation filed at 12/12/2022 1708   Mallampati Classification Class II: soft palate, uvula, fauces visible - No Difficulty filed at 12/12/2022 1708             ED Course  ED Course as of 12/12/22 1718   Mon Dec 12, 2022   1422 TT to IR   1435 Per Dr James Ca, IR, unlikely to be able to get pt in today - still has a large number of cases to finish today   1500 TT to nephro to discuss   1500 TT to urology to discuss   97 70 84 Per nephrology, no need for emergent dialysis   Will need catheter for dialysis tomorrow   1515 Per urology  - if pt admitted, will see inconsult   1530 TT to SLIM                                             MDM  Number of Diagnoses or Management Options  Chronic suprapubic catheter [facility]): new and requires workup  ESRD on hemodialysis [facility]): new and requires workup  Problem with vascular access: new and requires workup  Diagnosis management comments: No vascular access for her hemodialysis and ?problem with suprapubic catheter       Amount and/or Complexity of Data Reviewed  Clinical lab tests: ordered and reviewed  Discuss the patient with other providers: yes    Patient Progress  Patient progress: stable      Disposition  Final diagnoses:   Problem with vascular access   ESRD on hemodialysis [facility])   Chronic suprapubic catheter ([address] )     Time reflects when diagnosis was documented in both MDM as applicable and the Disposition within this note     Time User Action Codes Description Comment    12/12/2022  3:34 PM Dawn Walsh Add [Z78 9] Problem with vascular access     12/12/2022  3:34 PM Dean Maldonado Add [N18 6,  Z99 2] ESRD on hemodialysis ([facility] [address] )     12/12/2022  3:34 PM Harman MATTHEW Add [Z93 59] Chronic suprapubic catheter [facility])       ED Disposition     ED Disposition   Admit    Condition   Stable    Date/Time   Mon Dec 12, 2022  3:34 PM    Comment   Case was discussed with Emre Robin and the patient's admission status was agreed to be Admission Status: observation status to the service of Dr Emre Robin   Follow-up Information    None         Patient's Medications   Discharge Prescriptions    No medications on file       No discharge procedures on file      PDMP Review       Value Time User    PDMP Reviewed  Yes 11/30/2022  5:19 PM Jeannie Coburn [place]          ED Provider  Electronically Signed by           Jose Womack DO  12/12/22 2910

## 2022-12-13 ENCOUNTER — TELEPHONE (OUTPATIENT)
Dept: OTHER | Facility: HOSPITAL | Age: 62
End: 2022-12-13

## 2022-12-13 ENCOUNTER — APPOINTMENT (OUTPATIENT)
Dept: DIALYSIS | Facility: HOSPITAL | Age: 62
End: 2022-12-13

## 2022-12-13 VITALS
HEART RATE: 62 BPM | DIASTOLIC BLOOD PRESSURE: 60 MMHG | SYSTOLIC BLOOD PRESSURE: 113 MMHG | RESPIRATION RATE: 18 BRPM | OXYGEN SATURATION: 96 % | TEMPERATURE: 97.6 F

## 2022-12-13 LAB
FLUAV RNA RESP QL NAA+PROBE: NEGATIVE
FLUBV RNA RESP QL NAA+PROBE: NEGATIVE
GLUCOSE SERPL-MCNC: 128 MG/DL (ref 65–140)
GLUCOSE SERPL-MCNC: 153 MG/DL (ref 65–140)
RSV RNA RESP QL NAA+PROBE: NEGATIVE
SARS-COV-2 RNA RESP QL NAA+PROBE: NEGATIVE

## 2022-12-13 RX ADMIN — HEPARIN SODIUM 5000 UNITS: 5000 INJECTION INTRAVENOUS; SUBCUTANEOUS at 05:49

## 2022-12-13 RX ADMIN — CLOPIDOGREL BISULFATE 75 MG: 75 TABLET ORAL at 09:17

## 2022-12-13 RX ADMIN — OXYCODONE 5 MG: 5 TABLET ORAL at 10:50

## 2022-12-13 RX ADMIN — LOSARTAN POTASSIUM 25 MG: 25 TABLET, FILM COATED ORAL at 09:15

## 2022-12-13 RX ADMIN — INSULIN LISPRO 10 UNITS: 100 INJECTION, SOLUTION INTRAVENOUS; SUBCUTANEOUS at 09:23

## 2022-12-13 RX ADMIN — ASPIRIN 81 MG: 81 TABLET, COATED ORAL at 09:15

## 2022-12-13 RX ADMIN — CARVEDILOL 25 MG: 25 TABLET, FILM COATED ORAL at 09:16

## 2022-12-13 RX ADMIN — HEPARIN SODIUM 5000 UNITS: 5000 INJECTION INTRAVENOUS; SUBCUTANEOUS at 14:41

## 2022-12-13 RX ADMIN — PANTOPRAZOLE SODIUM 40 MG: 40 TABLET, DELAYED RELEASE ORAL at 05:49

## 2022-12-13 RX ADMIN — TORSEMIDE 80 MG: 20 TABLET ORAL at 09:15

## 2022-12-13 RX ADMIN — INSULIN GLARGINE 20 UNITS: 100 INJECTION, SOLUTION SUBCUTANEOUS at 09:17

## 2022-12-13 RX ADMIN — INSULIN LISPRO 10 UNITS: 100 INJECTION, SOLUTION INTRAVENOUS; SUBCUTANEOUS at 12:55

## 2022-12-13 RX ADMIN — CITALOPRAM HYDROBROMIDE 40 MG: 20 TABLET ORAL at 09:17

## 2022-12-13 RX ADMIN — INSULIN LISPRO 1 UNITS: 100 INJECTION, SOLUTION INTRAVENOUS; SUBCUTANEOUS at 09:23

## 2022-12-13 NOTE — CONSULTS
Consult - Urology   Amarjitmicah Dayana 1960, 63 y o  female MRN: [id number]    Unit/Bed#: ED 25 Encounter: [id number]    Assessment & Plan:  1  SP tube exchange  - SPT exchanged at bedside  See procedure note below  - CT showing collapsed bladder with SP morales, limited evaluation, no obstructive uropathy    - VSS, afebrile  - WBC 10 36, stable  - Cr 3 91, on dialysis  Pt getting dialysis in ED   - Pt will follow up outpatient in 1 month for routine SPT exchanges  - Urology will sign off at this time  Please do not hesitate to reach out with any questions or concerns    BEDSIDE PROCEDURE      Suprapubic Catheter Exchange PROCEDURE NOTE      Pre-operative Diagnosis: Neurogenic Bladder          Post-operative Diagnosis: same      INDICATION   63year old female with PMH ESRD, hemodialysis  long term SPT seen in ED for difficult SPT exchange    Consultation requested to perform SPT exchange  Last previous Catheter exchange date is 3 months ago  TIME OUT: Correct patient identity  PROCEDURE   Consent: Patient was agreeable  Formal Informed consent however, not applicable  Under sterile conditions the patient was positioned  Betadine solution and sterile drapes were utilized  Non- Petroleum based gel was used to lubricate insertion site  Utilized 16 Fr Catheter  Urine was obtained without any difficulties and Without evidence of hematuria or trauma  6 cc of normal saline was pushed into balloon  Findings  50 ml of clear yellow urine was obtained  Note: Patient's bladder was essentially empty at time of catheter insertion      Complications:  None; patient tolerated the procedure well  Condition: stable      Plan  Maintain SPT to straight drainage  Flush daily using Vonda syringe and 60 ml NSS  Next exchange in 4 weeks  No further  intervention required                Marlen Galaviz PA-C                          Subjective:   Pt is a 62 yo female with PMH ESRD on hemodialysis, DM2 who presented to ED today due to vascular access problem due to port falling out yesterday  Pt also reports cloudy urine in SPT, which had not been changed in 3 months  In the ED, two attempts were made to exchange SPT tube but unsuccessful  SPT was exchanged by myself at bedside today  Pt denies any fevers, chills, abdominal pain, flank pain  Review of Systems   Constitutional: Negative for chills and fever  Respiratory: Negative for cough and shortness of breath  Cardiovascular: Negative for chest pain and palpitations  Gastrointestinal: Negative for abdominal distention, abdominal pain, nausea and vomiting  Genitourinary: Negative for decreased urine volume, dysuria, flank pain and hematuria  Musculoskeletal: Negative for arthralgias and back pain  Skin: Negative for color change and rash  Neurological: Negative for seizures and syncope  All other systems reviewed and are negative  Objective:  Vitals: Blood pressure 150/67, pulse 97, temperature 97 8 °F (36 6 °C), temperature source Oral, resp  rate 18, SpO2 96 %, not currently breastfeeding  ,There is no height or weight on file to calculate BMI  Physical Exam  Vitals reviewed  Constitutional:       General: She is not in acute distress  Appearance: Normal appearance  She is obese  She is not ill-appearing, toxic-appearing or diaphoretic  HENT:      Head: Normocephalic and atraumatic  Right Ear: External ear normal       Left Ear: External ear normal       Nose: Nose normal       Mouth/Throat:      Mouth: Mucous membranes are moist    Eyes:      General: No scleral icterus  Conjunctiva/sclera: Conjunctivae normal    Cardiovascular:      Rate and Rhythm: Normal rate  Pulses: Normal pulses  Pulmonary:      Effort: Pulmonary effort is normal       Breath sounds: Normal breath sounds  Abdominal:      General: Abdomen is flat  There is no distension  Palpations: Abdomen is soft  There is no mass        Tenderness: There is no abdominal tenderness  There is no guarding  Comments: SPT in place draining clear yellow urine   Musculoskeletal:         General: Normal range of motion  Cervical back: Normal range of motion  Skin:     General: Skin is warm  Findings: No rash  Neurological:      General: No focal deficit present  Mental Status: She is alert and oriented to person, place, and time  Mental status is at baseline  Psychiatric:         Mood and Affect: Mood normal          Behavior: Behavior normal          Thought Content: Thought content normal          Judgment: Judgment normal          Imaging:  CT ABDOMEN AND PELVIS WITHOUT IV CONTRAST     INDICATION:   Bladder-neck obstruction  rule out bladder stones      COMPARISON:  8/25/2022      TECHNIQUE:  CT examination of the abdomen and pelvis was performed without intravenous contrast  Axial, sagittal, and coronal 2D reformatted images were created from the source data and submitted for interpretation       Radiation dose length product (DLP) for this visit:  868 42 mGy-cm   This examination, like all CT scans performed in the [facility], was performed utilizing techniques to minimize radiation dose exposure, including the use of iterative   reconstruction and automated exposure control       Enteric contrast was administered       FINDINGS:     ABDOMEN     LOWER CHEST:  Clear lung bases      LIVER/BILIARY TREE:  Unremarkable      GALLBLADDER:  Cholecystectomy      SPLEEN:  Punctate calcifications throughout the spleen suggesting prior granulomatous infection      PANCREAS:  Unremarkable      ADRENAL GLANDS:  Stable left adrenal 2 3 cm nodule      KIDNEYS/URETERS:  Extensive renal vascular calcifications  No obstructive uropathy      STOMACH AND BOWEL:  Unremarkable no evidence of bowel obstruction, colitis or diverticulitis    Stable fat-containing periumbilical hernia measuring 8 cm      APPENDIX:  No findings to suggest appendicitis      ABDOMINOPELVIC CAVITY:  No ascites  No pneumoperitoneum  No lymphadenopathy      VESSELS:  No abdominal aortic aneurysm      PELVIS     REPRODUCTIVE ORGANS:  No pelvic mass or fluid      URINARY BLADDER:  Collapsed bladder containing suprapubic catheter balloon  Limited evaluation      ABDOMINAL WALL/INGUINAL REGIONS:  Unremarkable      OSSEOUS STRUCTURES:  No acute fracture or destructive osseous lesion      IMPRESSION:     Collapsed bladder containing suprapubic Camargo catheter balloon  Limited evaluation  No obstructive uropathy         Labs:  Recent Labs     12/11/22 2005 12/12/22  1516   WBC 10 20* 10 36*     Recent Labs     12/11/22 2005 12/12/22  1516   HGB 11 8 11 7     Recent Labs     12/11/22 2005 12/12/22  1516   CREATININE 3 48* 3 91*         History:  Social History     Socioeconomic History   • Marital status:      Spouse name: None   • Number of children: None   • Years of education: None   • Highest education level: None   Occupational History   • None   Tobacco Use   • Smoking status: Former     Packs/day: 1 00     Years: 35 00     Pack years: 35 00     Types: Cigarettes     Quit date: 2012     Years since quitting: 10 9   • Smokeless tobacco: Never   Vaping Use   • Vaping Use: Never used   Substance and Sexual Activity   • Alcohol use: Not Currently   • Drug use: Never   • Sexual activity: Not Currently   Other Topics Concern   • None   Social History Narrative   • None     Social Determinants of Health     Financial Resource Strain: Low Risk    • Difficulty of Paying Living Expenses: Not hard at all   Food Insecurity: No Food Insecurity   • Worried About Running Out of Food in the Last Year: Never true   • Ran Out of Food in the Last Year: Never true   Transportation Needs: No Transportation Needs   • Lack of Transportation (Medical): No   • Lack of Transportation (Non-Medical):  No   Physical Activity: Not on file   Stress: Not on file   Social Connections: Not on file   Intimate Partner Violence: Not on file   Housing Stability: Low Risk    • Unable to Pay for Housing in the Last Year: No   • Number of Places Lived in the Last Year: 1   • Unstable Housing in the Last Year: No     Past Medical History:   Diagnosis Date   • Abnormal liver function    • Anemia    • Anxiety    • Arthritis    • Chronic kidney disease     stage 3   • Chronic narcotic dependence (HCC)    • Chronic pain disorder     lower back, hands , neck and knees   • Coronary artery disease    • Depression    • Diabetes mellitus ([address] )    • Elevated troponin 2/11/2022   • GERD (gastroesophageal reflux disease)     no meds at present   • Heart murmur     murmur   • Hyperlipidemia    • Hypertension    • Neurogenic bladder    • Open toe wound 12/2020    right big toe open calus but is dry at present   • Renal disorder    • Shortness of breath     exertion   • Sleep apnea     doesn't use cpap   • Suprapubic catheter [facility])      Past Surgical History:   Procedure Laterality Date   • AMPUTATION     • ANGIOPLASTY  2017 5   • BREAST EXCISIONAL BIOPSY Left    • BREAST SURGERY     • CARDIAC CATHETERIZATION     • CARDIAC CATHETERIZATION N/A 7/1/2022    Procedure: Cardiac catheterization;  Surgeon: Jessica Don MD;  [facility]: [facility]; Service: Cardiology   • CARDIAC CATHETERIZATION N/A 7/1/2022    Procedure: Cardiac pci;  Surgeon: Jessica Don MD;  [facility];   Service: Cardiology   • CARPAL TUNNEL RELEASE Bilateral    • CERVICAL FUSION     • HYSTERECTOMY  2008   • IR SUPRAPUBIC CATHETER PLACEMENT  6/15/2021   • IR TUNNELED DIALYSIS CATHETER PLACEMENT  7/15/2022   • KNEE SURGERY     • OOPHORECTOMY  2008   • AR EXC B9 LESION MRGN XCP SK TG S/N/H/F/G 3 1-4 0CM N/A 12/21/2020    Procedure: EXCISION SEBACEOUS CYST X 5 SCALP;  Surgeon: Aracelis Mary MD;  Location: [address] [place] [address];  Service: General   • TOE AMPUTATION Left    • TRIGGER FINGER RELEASE Right     4th Finger     Family History   Problem Relation Age of Onset   • Stroke Father    • Heart disease Father    • No Known Problems Mother    • No Known Problems Sister    • No Known Problems Daughter    • No Known Problems Maternal Grandmother    • No Known Problems Maternal Grandfather    • No Known Problems Paternal Grandmother    • No Known Problems Paternal Grandfather    • No Known Problems Maternal Aunt    • No Known Problems Maternal Aunt    • No Known Problems Maternal Aunt    • No Known Problems Maternal Aunt    • No Known Problems Maternal Aunt    • No Known Problems Maternal Aunt    • No Known Problems Paternal Aunt        Nanci Curry  Date: 12/13/2022 Time: 10:32 AM

## 2022-12-13 NOTE — CONSULTS
Consultation - Nephrology   Magdalena Baig 63 y o  female MRN: [id number]  Unit/Bed#: ED 25 Encounter: [id number]    ASSESSMENT:    ESRD on HD (TTS)  -Location: [facility]  -Dry weight: 108 kg, recent post HD weight of 107 kg, can continue to challenge dry weight as needed  -Last HD was 12/10, next HD today  -Stable for discharge today after HD if patient receives suprapubic catheter replacement    Access  -Tunneled HD PermCath placed 12/12 after prior tunneled HD line fell out spontaneously  -Eventual outpatient vascular referral for fistula    Blood pressure  -Continue outpatient regimen    CKD Anemia:  -Recent hgb: 11 7  -Medications: Epogen 2000 units 3 times a week and Venofer 100 mg 3 times a week with HD, can continue outpatient regimen    CKD MBD  -Secondary hyperparathyroidism, continue calcitriol 0 25 mcg 3 times a week    Urinary retention status post suprapubic catheter placement  -Needs replacement on admission, urology consulted    Additional medical problems: Hypothyroidism on Synthroid, morbid obesity, heart failure with reduced ejection fraction, gout on allopurinol      HISTORY OF PRESENT ILLNESS:  Requesting Physician: Nicole Young MD  Reason for Consult: ESRD on HD    Magdalena Baig is a 63y o  year old female who was [facility] after presenting with vascular access problems, patient reports her tunneled HD catheter fell out while at home  A renal consultation is requested today for assistance in the management of ESRD  Magdalena Baig is a known ESRD patient who undergoes maintenance hemodialysis at [facility] on TTS      PAST MEDICAL HISTORY:  Past Medical History:   Diagnosis Date   • Abnormal liver function    • Anemia    • Anxiety    • Arthritis    • Chronic kidney disease     stage 3   • Chronic narcotic dependence ([address] )    • Chronic pain disorder     lower back, hands , neck and knees   • Coronary artery disease    • Depression    • Diabetes mellitus ([facility] [address] )    • Elevated troponin 2/11/2022   • GERD (gastroesophageal reflux disease)     no meds at present   • Heart murmur     murmur   • Hyperlipidemia    • Hypertension    • Neurogenic bladder    • Open toe wound 12/2020    right big toe open calus but is dry at present   • Renal disorder    • Shortness of breath     exertion   • Sleep apnea     doesn't use cpap   • Suprapubic catheter ([facility] [address] )        PAST SURGICAL HISTORY:  Past Surgical History:   Procedure Laterality Date   • AMPUTATION     • ANGIOPLASTY  2017 5   • BREAST EXCISIONAL BIOPSY Left    • BREAST SURGERY     • CARDIAC CATHETERIZATION     • CARDIAC CATHETERIZATION N/A 7/1/2022    Procedure: Cardiac catheterization;  Surgeon: Jeferson Duran MD;  [facility]; Service: Cardiology   • CARDIAC CATHETERIZATION N/A 7/1/2022    Procedure: Cardiac pci;  Surgeon: Jeferson Duran MD;  [facility]: [facility];   Service: Cardiology   • CARPAL TUNNEL RELEASE Bilateral    • CERVICAL FUSION     • HYSTERECTOMY  2008   • IR SUPRAPUBIC CATHETER PLACEMENT  6/15/2021   • IR TUNNELED DIALYSIS CATHETER PLACEMENT  7/15/2022   • KNEE SURGERY     • OOPHORECTOMY  2008   • WA EXC SKIN BENIG 3 1-4 CM REMAINDR BODY N/A 12/21/2020    Procedure: EXCISION SEBACEOUS CYST X 5 SCALP;  Surgeon: Devendra Simon MD;  Location: [address] [place] [address];  Service: General   • TOE AMPUTATION Left    • TRIGGER FINGER RELEASE Right     4th Finger       ALLERGIES:  Allergies   Allergen Reactions   • Codeine      Other reaction(s): Nausea and Vomiting   • Latex Itching       SOCIAL HISTORY:  Social History     Substance and Sexual Activity   Alcohol Use Not Currently     Social History     Substance and Sexual Activity   Drug Use Never     Social History     Tobacco Use   Smoking Status Former   • Packs/day: 1 00   • Years: 35 00   • Pack years: 35 00   • Types: Cigarettes   • Quit date: 2012   • Years since quitting: 10 9   Smokeless Tobacco Never       FAMILY HISTORY:  Family History   Problem Relation Age of Onset   • Stroke Father    • Heart disease Father    • No Known Problems Mother    • No Known Problems Sister    • No Known Problems Daughter    • No Known Problems Maternal Grandmother    • No Known Problems Maternal Grandfather    • No Known Problems Paternal Grandmother    • No Known Problems Paternal Grandfather    • No Known Problems Maternal Aunt    • No Known Problems Maternal Aunt    • No Known Problems Maternal Aunt    • No Known Problems Maternal Aunt    • No Known Problems Maternal Aunt    • No Known Problems Maternal Aunt    • No Known Problems Paternal Aunt        MEDICATIONS:    Current Facility-Administered Medications:   •  aspirin (ECOTRIN LOW STRENGTH) EC tablet 81 mg, 81 mg, Oral, Daily, Carlton Lynch MD, 81 mg at 12/13/22 0915  •  atorvastatin (LIPITOR) tablet 40 mg, 40 mg, Oral, Daily With Rokcy Hair MD, 40 mg at 12/12/22 1840  •  carvedilol (COREG) tablet 25 mg, 25 mg, Oral, BID With Meals, Carlton Lynch MD, 25 mg at 12/13/22 0916  •  citalopram (CeleXA) tablet 40 mg, 40 mg, Oral, Daily, Carlton Lynch MD, 40 mg at 12/13/22 4096  •  clopidogrel (PLAVIX) tablet 75 mg, 75 mg, Oral, Daily, Carlton Lynch MD, 75 mg at 12/13/22 3569  •  heparin (porcine) subcutaneous injection 5,000 Units, 5,000 Units, Subcutaneous, Q8H [facility], Carlton Lynch MD, 5,000 Units at 12/13/22 0549  •  insulin glargine (LANTUS) subcutaneous injection 20 Units 0 2 mL, 20 Units, Subcutaneous, Q12H [facility], Carlton Lynch MD, 20 Units at 12/13/22 0917  •  insulin lispro (HumaLOG) 100 units/mL subcutaneous injection 1-5 Units, 1-5 Units, Subcutaneous, TID AC, 1 Units at 12/13/22 0923 **AND** Fingerstick Glucose (POCT), , , TID AC, Jacob Harvey MD  •  insulin lispro (HumaLOG) 100 units/mL subcutaneous injection 10 Units, 10 Units, Subcutaneous, TID With Meals, Carlton Lynch MD, 10 Units at 12/13/22 8750  •  isosorbide mononitrate (IMDUR) 24 hr tablet 30 mg, 30 mg, Oral, QPM, Magdalene Don MD, 30 mg at 12/12/22 1840  •  losartan (COZAAR) tablet 25 mg, 25 mg, Oral, Daily, Magdalene Don MD, 25 mg at 12/13/22 0915  •  OLANZapine (ZyPREXA) tablet 5 mg, 5 mg, Oral, HS, Magdalene Don MD, 5 mg at 12/12/22 2111  •  oxyCODONE (ROXICODONE) IR tablet 5 mg, 5 mg, Oral, Q12H PRN, Magdalene Don MD, 5 mg at 12/13/22 1050  •  pantoprazole (PROTONIX) EC tablet 40 mg, 40 mg, Oral, Early Morning, Magdalene Don MD, 40 mg at 12/13/22 0549  •  tiZANidine (ZANAFLEX) tablet 4 mg, 4 mg, Oral, Q8H PRN, Magdalene Don MD  •  torsemide [facility]) tablet 80 mg, 80 mg, Oral, Daily, Magdalene Don MD, 80 mg at 12/13/22 7750    Current Outpatient Medications:   •  allopurinol (ZYLOPRIM) 300 mg tablet, Take 1 tablet (300 mg total) by mouth daily, Disp: 90 tablet, Rfl: 1  •  aspirin (ECOTRIN LOW STRENGTH) 81 mg EC tablet, Take 1 tablet (81 mg total) by mouth daily, Disp: 90 tablet, Rfl: 1  •  atorvastatin (LIPITOR) 40 mg tablet, Take 1 tablet (40 mg total) by mouth daily, Disp: 90 tablet, Rfl: 1  •  carvedilol (COREG) 25 mg tablet, Take 1 tablet (25 mg total) by mouth 2 (two) times a day with meals, Disp: 180 tablet, Rfl: 2  •  citalopram (CeleXA) 40 mg tablet, Take 1 tablet (40 mg total) by mouth daily, Disp: 90 tablet, Rfl: 1  •  clopidogrel (PLAVIX) 75 mg tablet, Take 1 tablet (75 mg total) by mouth daily, Disp: 90 tablet, Rfl: 1  •  docusate sodium (COLACE) 50 mg capsule, Take 1 capsule (50 mg total) by mouth 2 (two) times a day, Disp: 180 capsule, Rfl: 0  •  Dulaglutide (Trulicity) 1 5 GI/9 8EA SOPN, Inject 0 5 mL (1 5 mg total) under the skin once a week, Disp: 6 mL, Rfl: 1  •  Incontinence Supplies (Urinary Drainage Bag) MISC, Attach one bag to suprapubic catheter as needed, Disp: , Rfl:   •  Incontinence Supplies (Urinary Leg Bag) KIT, Attach one bag to suprapubic catheter morales as needed, Disp: , Rfl:   •  Insulin Glargine Solostar (Lantus SoloStar) 100 UNIT/ML SOPN, Inject 0 25 mL (25 Units total) under the skin every 12 (twelve) hours, Disp: 30 mL, Rfl: 0  •  insulin lispro (HumaLOG) 100 units/mL injection pen, Inject 10 Units under the skin 3 (three) times a day with meals, Disp: 15 mL, Rfl: 0  •  Insulin Pen Needle (BD Pen Needle Micro U/F) 32G X 6 MM MISC, Use 5 (five) times a day, Disp: 200 each, Rfl: 5  •  Insulin Syringe-Needle U-100 (BD Veo Insulin Syringe U/F) 31G X 15/64" 0 5 ML MISC, Inject under the skin 3 (three) times a day, Disp: 100 each, Rfl: 2  •  isosorbide mononitrate (IMDUR) 30 mg 24 hr tablet, Take 1 tablet (30 mg total) by mouth every evening, Disp: 30 tablet, Rfl: 0  •  ketoconazole (NIZORAL) 2 % cream, Apply to suprapubic catheter site twice daily  , Disp: , Rfl:   •  Lancets (OneTouch Delica Plus [id number]) MISC, USE TO TEST 3 TIMES DAILY, Disp: 100 each, Rfl: 2  •  levothyroxine 50 mcg tablet, Take 1 tablet (50 mcg total) by mouth daily, Disp: 90 tablet, Rfl: 0  •  losartan (COZAAR) 25 mg tablet, Take 1 tablet (25 mg total) by mouth daily, Disp: 30 tablet, Rfl: 0  •  naloxone (NARCAN) 4 mg/0 1 mL nasal spray, Administer 1 spray into a nostril   If breathing does not return to normal or if breathing difficulty resumes after 2-3 minutes, give another dose in the other nostril using a new spray , Disp: 1 each, Rfl: 1  •  nitroglycerin (NITROSTAT) 0 4 mg SL tablet, Place 1 tablet (0 4 mg total) under the tongue every 5 (five) minutes as needed for chest pain, Disp: 25 tablet, Rfl: 1  •  nystatin (MYCOSTATIN) powder, Apply topically 2 (two) times a day, Disp: 30 g, Rfl: 1  •  OLANZapine (ZyPREXA) 5 mg tablet, TAKE 1 TABLET BY MOUTH AT BEDTIME, Disp: 90 tablet, Rfl: 0  •  ondansetron (ZOFRAN-ODT) 4 mg disintegrating tablet, Take 1 tablet (4 mg total) by mouth every 8 (eight) hours as needed for nausea or vomiting, Disp: 20 tablet, Rfl: 0  •  OneTouch Ultra test strip, USE 1 EACH DAILY USE AS INSTRUCTED, Disp: 100 strip, Rfl: 2  •  oxyCODONE (ROXICODONE) 5 immediate release tablet, Take 1 tablet (5 mg total) by mouth every 12 (twelve) hours as needed for severe pain Max Daily Amount: 10 mg, Disp: 60 tablet, Rfl: 0  •  pantoprazole (PROTONIX) 40 mg tablet, Take 1 tablet (40 mg total) by mouth daily, Disp: 180 tablet, Rfl: 1  •  simethicone (MYLICON,GAS-X) 685 MG capsule, Take 1 capsule (180 mg total) by mouth every 8 (eight) hours, Disp: 40 capsule, Rfl: 0  •  tiZANidine (ZANAFLEX) 4 mg tablet, Take 1 tablet (4 mg total) by mouth every 8 (eight) hours as needed for muscle spasms, Disp: 270 tablet, Rfl: 1  •  Torsemide 40 MG TABS, Take 80 mg by mouth daily, Disp: 180 tablet, Rfl: 0  •  ferrous sulfate 325 (65 Fe) mg tablet, Take 1 tablet (325 mg total) by mouth every other day (Patient not taking: Reported on 12/12/2022), Disp: 45 tablet, Rfl: 0    REVIEW OF SYSTEMS:  A complete 10 point review of systems was performed and found to be negative unless otherwise noted below or in the HPI  Review of Systems   Constitutional: Negative  HENT: Negative  Respiratory: Negative  Cardiovascular: Negative  Gastrointestinal: Negative  Genitourinary: Negative  Musculoskeletal: Negative  PHYSICAL EXAM:  Current Weight:    First Weight:    Vitals:    12/13/22 0745 12/13/22 0921 12/13/22 1030 12/13/22 1100   BP: 150/67  (!) 87/62 100/78   BP Location: Right arm  Right arm    Pulse: 97  86 68   Resp:   18 18   Temp:  97 8 °F (36 6 °C) 97 8 °F (36 6 °C)    TempSrc:  Oral Tympanic    SpO2: 96%          Intake/Output Summary (Last 24 hours) at 12/13/2022 1120  Last data filed at 12/13/2022 1030  Gross per 24 hour   Intake 250 ml   Output --   Net 250 ml     Physical Exam  Vitals and nursing note reviewed  Constitutional:       General: She is not in acute distress  Appearance: Normal appearance  She is obese  She is not toxic-appearing or diaphoretic  HENT:      Head: Normocephalic and atraumatic        Nose: Nose normal       Mouth/Throat:      Mouth: Mucous membranes are moist    Eyes:      General: No scleral icterus  Cardiovascular:      Rate and Rhythm: Normal rate and regular rhythm  Pulses: Normal pulses  Heart sounds: Normal heart sounds  Pulmonary:      Effort: Pulmonary effort is normal  No respiratory distress  Breath sounds: No wheezing or rales  Chest:       Abdominal:      General: Abdomen is flat  There is no distension  Palpations: Abdomen is soft  Genitourinary:     Comments: Suprapubic catheter with clear urine in bag  Musculoskeletal:      Cervical back: Neck supple  Comments: Trace lower extremity edema bilaterally   Skin:     General: Skin is warm and dry  Capillary Refill: Capillary refill takes less than 2 seconds  Neurological:      General: No focal deficit present  Mental Status: She is alert and oriented to person, place, and time  Psychiatric:         Mood and Affect: Mood normal           Invasive Devices:      Lab Results:   Results from last 7 days   Lab Units 12/12/22  1516 12/11/22 2005   WBC Thousand/uL 10 36* 10 20*   HEMOGLOBIN g/dL 11 7 11 8   HEMATOCRIT % 35 6 34 9   PLATELETS Thousands/uL 263 268   POTASSIUM mmol/L 5 2 5 3   CHLORIDE mmol/L 100 99   CO2 mmol/L 28 27   BUN mg/dL 67* 58*   CREATININE mg/dL 3 91* 3 48*   CALCIUM mg/dL 9 6 9 3     Lab Results   Component Value Date     2 (H) 07/16/2022    CALCIUM 9 6 12/12/2022    PHOS 4 1 07/17/2022       I have personally reviewed the blood work as stated above and in my note  I have personally reviewed CT abdomen pelvis imaging studies  I have personally reviewed internal medicine and urology note

## 2022-12-13 NOTE — TELEPHONE ENCOUNTER
Patient seen in consult at [facility] today  SPT exchanged today  Last exchange at [address] [facility] office on 10/6/22  Pls assist in scheduling nursing visit in 4 weeks for next exchange  Can cancel office visit tomorrow  Thanks!

## 2022-12-13 NOTE — ASSESSMENT & PLAN NOTE
Lab Results   Component Value Date    EGFR 11 12/12/2022    EGFR 13 12/11/2022    EGFR 19 08/26/2022    CREATININE 3 91 (H) 12/12/2022    CREATININE 3 48 (H) 12/11/2022    CREATININE 2 61 (H) 08/26/2022   Underwent dialysis session this morning now that new PermCath has been accessed

## 2022-12-13 NOTE — DISCHARGE SUMMARY
[address] [facility]  Discharge- [facility] Fa 1960, 63 y o  female MRN: [id number]  Unit/Bed#: ED 25 Encounter: [id number]  Primary Care Provider: CHERELLE Richardson   Date and time admitted to hospital: 12/12/2022  2:21 PM    * Problem with vascular access  Assessment & Plan  Patient presented with HD catheter malfunction  · IR placed HD catheter while in the ED last evening    ESRD (end stage renal disease) [facility])  Assessment & Plan  Lab Results   Component Value Date    EGFR 11 12/12/2022    EGFR 13 12/11/2022    EGFR 19 08/26/2022    CREATININE 3 91 (H) 12/12/2022    CREATININE 3 48 (H) 12/11/2022    CREATININE 2 61 (H) 08/26/2022   Underwent dialysis session this morning now that new PermCath has been accessed    Chronic combined systolic and diastolic congestive heart failure (HCC)  Assessment & Plan  Wt Readings from Last 3 Encounters:   12/11/22 105 kg (232 lb 2 3 oz)   11/29/22 112 kg (246 lb 4 8 oz)   09/01/22 117 kg (258 lb 4 8 oz)     Currently euvolemic, continue torsemide, controlled with dialysis          Suprapubic catheter ([address] )  Assessment & Plan  Suprapubic catheter last changed 3 months ago  · urology replaced catheter this morning    Type 2 diabetes mellitus with stage 5 chronic kidney disease not on chronic dialysis, with long-term current use of insulin ([address] )  Assessment & Plan  Lab Results   Component Value Date    HGBA1C 8 1 (H) 08/26/2022     Continue home insulin regimen  Decrease Lantus to 20 units every 12, Humalog 10 units 3 times daily with meals    Medical Problems     Resolved Problems  Date Reviewed: 12/12/2022   None       Discharging Physician / Practitioner: Heena Rodriguez PA-C  PCP: Purvi Richardson  Admission Date:   Admission Orders (From admission, onward)     Ordered        12/12/22 1535  Place in Observation  Once                      Discharge Date: 12/13/22    Consultations During Hospital Stay:  · Nephrology, IR, urology    Procedures Performed:   · None    Significant Findings / Test Results:   CT abdomen pelvis wo contrast    Result Date: 12/13/2022  Narrative: CT ABDOMEN AND PELVIS WITHOUT IV CONTRAST INDICATION:   Bladder-neck obstruction rule out bladder stones  COMPARISON:  8/25/2022  TECHNIQUE:  CT examination of the abdomen and pelvis was performed without intravenous contrast  Axial, sagittal, and coronal 2D reformatted images were created from the source data and submitted for interpretation  Radiation dose length product (DLP) for this visit:  868 42 mGy-cm   This examination, like all CT scans performed in the [facility], was performed utilizing techniques to minimize radiation dose exposure, including the use of iterative  reconstruction and automated exposure control  Enteric contrast was administered  FINDINGS: ABDOMEN LOWER CHEST:  Clear lung bases  LIVER/BILIARY TREE:  Unremarkable  GALLBLADDER:  Cholecystectomy  SPLEEN:  Punctate calcifications throughout the spleen suggesting prior granulomatous infection  PANCREAS:  Unremarkable  ADRENAL GLANDS:  Stable left adrenal 2 3 cm nodule  KIDNEYS/URETERS:  Extensive renal vascular calcifications  No obstructive uropathy  STOMACH AND BOWEL:  Unremarkable no evidence of bowel obstruction, colitis or diverticulitis  Stable fat-containing periumbilical hernia measuring 8 cm  APPENDIX:  No findings to suggest appendicitis  ABDOMINOPELVIC CAVITY:  No ascites  No pneumoperitoneum  No lymphadenopathy  VESSELS:  No abdominal aortic aneurysm  PELVIS REPRODUCTIVE ORGANS:  No pelvic mass or fluid  URINARY BLADDER:  Collapsed bladder containing suprapubic catheter balloon  Limited evaluation  ABDOMINAL WALL/INGUINAL REGIONS:  Unremarkable  OSSEOUS STRUCTURES:  No acute fracture or destructive osseous lesion  Impression: Collapsed bladder containing suprapubic [place] catheter balloon  Limited evaluation    No obstructive uropathy Workstation performed: [id number]     XR chest 2 views    Result Date: 12/12/2022  Narrative: CHEST INDICATION:   right chest wall dialysis access "fell out"    COMPARISON:  8/25/2022 EXAM PERFORMED/VIEWS:  XR CHEST PA & LATERAL  The frontal view was performed utilizing dual energy radiographic technique  FINDINGS: Cardiomediastinal silhouette appears unremarkable  The lungs are clear  No pneumothorax or pleural effusion  Cervical spine fusion hardware noted     Impression: No acute cardiopulmonary disease  Workstation performed: [id number]       Incidental Findings:   · none     Test Results Pending at Discharge (will require follow up):   · none     Outpatient Tests Requested:  · none    Complications:  none    Reason for Admission: catheter malfunction    Hospital Course:   Rios Hollis is a 63 y o  female patient who originally presented to the hospital on 12/12/2022 due to HD catheter malfunction  Patient has a history of ESRD on hemodialysis, suprapubic catheter secondary to neurogenic bladder, type 2 diabetes, hypertension, CHF  The patient initially presented the day prior to admission and the case was discussed with nephrology recommended admission if interventional radiology was unable to access it that day  She was then discharged home and recommended to have a change as an outpatient the following day however this was unable to happen and IR recommended she come to the ED for placement  She underwent HD catheter placement last evening, urology was additionally consulted as her suprapubic tube also needed changed  During her stay, urology changed her suprapubic tube and the patient underwent hemodialysis once HD catheter was back in place  She was discharged in stable condition on 12/13/2022  Please see above list of diagnoses and related plan for additional information  Condition at Discharge: stable    Discharge Day Visit / Exam:   Subjective: The patient is seen resting comfortably in bed    She is happy that she gets to go home today  She denies any shortness of breath, chest pain, nausea, vomiting, lower extremity swelling  Vitals: Blood Pressure: 113/60 (12/13/22 1400)  Pulse: 62 (12/13/22 1400)  Temperature: 97 8 °F (36 6 °C) (12/13/22 1030)  Temp Source: Tympanic (12/13/22 1030)  Respirations: 18 (12/13/22 1400)  SpO2: 96 % (12/13/22 0745)  Exam:   Physical Exam  Vitals and nursing note reviewed  Constitutional:       General: She is not in acute distress  Appearance: Normal appearance  She is obese  She is not ill-appearing, toxic-appearing or diaphoretic  HENT:      Head: Normocephalic and atraumatic  Cardiovascular:      Rate and Rhythm: Normal rate and regular rhythm  Heart sounds: No murmur heard  No friction rub  No gallop  Pulmonary:      Effort: Pulmonary effort is normal  No respiratory distress  Breath sounds: Normal breath sounds  No wheezing, rhonchi or rales  Abdominal:      General: Abdomen is flat  Bowel sounds are normal  There is no distension  Palpations: Abdomen is soft  Tenderness: There is no abdominal tenderness  Musculoskeletal:      Right lower leg: No edema  Left lower leg: No edema  Skin:     General: Skin is warm and dry  Coloration: Skin is not jaundiced or pale  Neurological:      General: No focal deficit present  Mental Status: She is alert  Mental status is at baseline  Discussion with Family: Patient declined call to   Discharge instructions/Information to patient and family:   See after visit summary for information provided to patient and family  Provisions for Follow-Up Care:  See after visit summary for information related to follow-up care and any pertinent home health orders  Disposition:   Home    Planned Readmission: n/a     Discharge Statement:  I spent 28 minutes discharging the patient  This time was spent on the day of discharge   I had direct contact with the patient on the day of discharge  Greater than 50% of the total time was spent examining patient, answering all patient questions, arranging and discussing plan of care with patient as well as directly providing post-discharge instructions  Additional time then spent on discharge activities  Discharge Medications:  See after visit summary for reconciled discharge medications provided to patient and/or family        **Please Note: This note may have been constructed using a voice recognition system**

## 2022-12-13 NOTE — ED NOTES
Patient is resting on bedside chair at this time; patient noted to be in no distress at this time       Lucy Carcamo RN  12/12/22 2631

## 2022-12-13 NOTE — TELEPHONE ENCOUNTER
Patient's SPT exchanged 10/22/2021  Patient does not come to office for monthly changes  Please see Shira's RN encounter dated 12/12/2022  Need to arrange home care service for patient

## 2022-12-13 NOTE — HEMODIALYSIS
Post-Dialysis RN Treatment Note    Blood Pressure:  Pre-87/62 mm/Hg  Post-113/60 mmHg   EDW-108 kg    Weight:  Pre-110 kg   Post-108 kg   Mode of weight measurement: bed scale    Volume Removed-2000 ml    Treatment duration-210 minutes    NS given - NO   Treatment shortened?   NO   Medications given during Rx- NA   Estimated Kt/V- NA   Access type: R IJ catheter    Access Issues: reversed lines     Report called to primary nurse- Jose Lai RN

## 2022-12-13 NOTE — ED NOTES
Salome Echavarria RN assumed care of patient at this time; patient noted to be resting comfortably at this time with call bell at bedside      Mary Feliciano RN  12/12/22 1930

## 2022-12-13 NOTE — ED NOTES
Jesus Cain RN went to check on patient at this time, patient noted to be sitting in chair playing games on her phone; patient denies needing anything at this time;      Blu Pat RN  12/12/22 1912

## 2022-12-13 NOTE — PROGRESS NOTES
Hemodialysis note    Patient seen and examined during dialysis treatment, her new PermCath is accessed, line has to be reversed, her blood pressure is stable, no acute issue with dialysis so far  Continue with dialysis following outpatient orders

## 2022-12-13 NOTE — ASSESSMENT & PLAN NOTE
Patient presented with HD catheter malfunction  · IR placed HD catheter while in the ED last evening

## 2022-12-13 NOTE — ED NOTES
Patient noted to still in be chair at this time; Stephanie Day RN offered to help patient to bed, patient states that she wants to sit in the chair       Gisel York RN  12/12/22 8587

## 2022-12-14 ENCOUNTER — PATIENT OUTREACH (OUTPATIENT)
Dept: FAMILY MEDICINE CLINIC | Facility: CLINIC | Age: 62
End: 2022-12-14

## 2022-12-14 DIAGNOSIS — N31.9 NEUROGENIC BLADDER: Primary | ICD-10-CM

## 2022-12-14 NOTE — TELEPHONE ENCOUNTER
Patient called to make sure she had addressed LK v/m from this am  I reminded her that she spoke with alexandria ROBLES after that and she was scheduled for cysto

## 2022-12-14 NOTE — PROGRESS NOTES
I called the patient to follow up after her recent hospital stay to replace her dialysis catheter  The patient notes the catheter unknowingly came out which alarmed her  She noted the area was itchy a few days prior  She has had dialysis since the change without any issues  The patient also needed her [facility] exchange as she has not had a new one placed in 3 months  The patient stated she experienced much discomfort with both catheter changes  The patient has not been checking her blood sugars because she lost her glucometer with her recent move  She needs to wait to get one until insurance can cover the cost   I asked her to continue with a strict diet  I reviewed her DM meds and she states she is taking them as prescribed  The patient states she is checking her feet daily for skin breakdown  The patient is aware of her Vascular appointment on 12/16 and plans on attending  She will call with any questions or concerns  I will continue to follow  Chart reviewed

## 2022-12-14 NOTE — TELEPHONE ENCOUNTER
Orders placed for [facility] for SPT changes    She also needs to be scheduled for cystoscopy to evaluate her neurogenic bladder due to chronic indwelling catheter

## 2022-12-15 ENCOUNTER — TELEPHONE (OUTPATIENT)
Dept: CARDIOLOGY CLINIC | Facility: CLINIC | Age: 62
End: 2022-12-15

## 2022-12-15 ENCOUNTER — PATIENT OUTREACH (OUTPATIENT)
Dept: FAMILY MEDICINE CLINIC | Facility: CLINIC | Age: 62
End: 2022-12-15

## 2022-12-15 NOTE — PROGRESS NOTES
Outgoing Calls:  12/15/2022    CMOC did call pt to assist in calling [employer] and inquire about status of MA application  We were informed pt now needs to provide copy of a lease or letter stating address she resides at and what her rent amount is  Pt expressed understanding and agreed to work on this tonight  Pt will forward to [facility] once completed  CMOC to f/u  Next outreach is scheduled for 12/19/2022

## 2022-12-15 NOTE — TELEPHONE ENCOUNTER
PC from patient stating she is having BP issues, BP running low  I looked at medication list and PCP gives her all her BP medication  Ireferredher to PCP for advisement  If cardiology needs to get involved, I told Jose Stover to give us a call back

## 2022-12-16 ENCOUNTER — HOSPITAL ENCOUNTER (OUTPATIENT)
Dept: NON INVASIVE DIAGNOSTICS | Facility: CLINIC | Age: 62
Discharge: HOME | End: 2022-12-16

## 2022-12-16 DIAGNOSIS — N18.6 ESRD (END STAGE RENAL DISEASE) (HCC): ICD-10-CM

## 2022-12-19 ENCOUNTER — PATIENT OUTREACH (OUTPATIENT)
Dept: FAMILY MEDICINE CLINIC | Facility: CLINIC | Age: 62
End: 2022-12-19

## 2022-12-19 NOTE — PROGRESS NOTES
Incoming Call / Email:  12/19/2022    [facility] received a call from pt stating that she has the letter [employer] is requesting from her daughter and [place]on [place] which states that pt is residing with them and what she pays in rent  She text copy to [facility] and [facility] did forward to [facility] via email for review  Pt will call [employer] tomorrow to check on updates and is aware [employer] can take up to 72 hours to acknowledge receipt of letter  CMOC to f/u  Next outreach is scheduled for 12/22/2022

## 2022-12-21 NOTE — MORECARE
CASE MANAGEMENT DISCHARGE SUMMARY
 
 
PATIENT: MIREYA BENOIT                  UNIT: [id number]
ACCOUNT#: [id number]                       ADM DATE: 20
AGE: 59     : [birth year]  SEX: F            ROOM/BED: D.2232    
AUTHOR: DIAMOND,DOC                             PHYSICIAN:                               
 
REFERRING PHYSICIAN: JOAQUINA BARFIELD MD    
DATE OF SERVICE: 20
Discharge Plan
 
 
Patient Name: MIREYA BENOIT
Facility: [facility]:[place]
Encounter #: [id number]
Medical Record #: [id number]
: 1960
Planned Disposition: 
Anticipated Discharge Date: 
 
Discharge Date: 2020
Expected LOS: 
Initial Reviewer: [email or web address]
Initial Review Date: 2020
Generated: 20   3:22 pm 
DCP- Discharge Planning
 
Updated by [id number]: Mya Butler on 20   8:59 am CT
Patient Name: MIREYA BENOIT                                    
Admission Status: Elective  
Accout number: [id number]                             
Admission Date: 2020  
: 1960                                                       
Admission Diagnosis:ABSCESS OF THE BREAST AND NIPPLE  
Attending: JOAQUINA BARFIELD                                               
Current LOS:  3  
 
Anticipated DC Date:   
Planned Disposition:   
Primary Insurance: [employer]  
 
 
Discharge Planning Comments: CM met with patient at bedside after explaining 
CM role and obtaining verbal consent. CM discussed availability / needs of 
home health, REHAB and medical equipment. WILL NEED HH WITH WOUND CARE AT 
TIME OF DC, JACKY SIGNED FOR [facility]. CM WILL FOLLOW AS NEEDED.  
 
 
 
 
 
 
 
: Mya Butler
 DCPIA - Discharge Planning Initial Assessment
 
Updated by [id number]: Mya Butler on 20   9:58 am
*  Is the patient Alert and Oriented?
Yes
*  PCP
MINDY
*  Pharmacy
[facility]
*  Preadmission Environment
Home with Family
*  ADLs
Independent
*  Other Equipment
CPAP, GLUCOMETER, CANE, WALKER
*  Additional services required to return to the preadmission environment?
Yes
*  Can the patient safely return to the preadmission environment?
Yes
*  Has this patient been hospitalized within the prior 30 days at any 
hospital?
No
 
 
 
 
 
Coverage Notice
 
Reviewer: [id number] - Mya Butler
 
Notice Issued Date-Time: 2020   9:59
Notice Type: Patient Choice Letter
 
Notice Delivered To: Patient
Relationship to Patient: 
Representative Name: 
 
Delivery Method: HAND - Hand Delivered
Elba Days:
Prior Verbal Notification: 
 
Recipient Understood Notice: Yes
Recipient Signature: Yes
Med Rec Note Co-signed by Attending:
 
Coverage Notice Comment:  [facility]
Reviewer: [email or web address] Obdulio Butler
 
 
Notice Issued Date-Time: 2020   9:59
Notice Type: IM Discharge Notice
 
Notice Delivered To: Patient
Relationship to Patient: 
Representative Name: 
 
Delivery Method: HAND - Hand Delivered
Elba Days:
Prior Verbal Notification: 
 
Recipient Understood Notice: Yes
Recipient Signature: Yes
Med Rec Note Co-signed by Attending:
 
Coverage Notice Comment:  
Reviewer: [id number] Butler
 
Notice Issued Date-Time: 2020   9:48
Notice Type: IM Discharge Notice
 
Notice Delivered To: Patient
Relationship to Patient: 
Representative Name: 
 
Delivery Method: HAND - Hand Delivered
Elba Days:
Prior Verbal Notification: 
 
Recipient Understood Notice: Yes
Recipient Signature: Yes
Med Rec Note Co-signed by Attending:
 
Coverage Notice Comment:  
 
Last DP export: 20  11:51 a
Patient Name: MIREYA BENOIT
 
Encounter #: [id number]
Page [id number]
 
 
 
 
 
Electronically Signed by TEO FIELDS on 20 at 1423
 
 
 
 
 
 
**All edits/amendments must be made on the electronic document**
 
DICTATION DATE: 20     : ROSALES  20 142     
RPT#: [id number]                                DC DATE:20
                                               STATUS: DIS IN  
[facility]
 [place] [address]
***END OF REPORT***
CASE MANAGEMENT DISCHARGE SUMMARY
 
 
PATIENT: MIREYA BENOIT                  UNIT: [id number]
ACCOUNT#: [id number]                       ADM DATE: 20
AGE: 59     : 64  SEX: F            ROOM/BED: D.2232    
AUTHOR: DIAMOND,DOC                             PHYSICIAN:                               
 
REFERRING PHYSICIAN: JOAQUINA BARFIELD MD    
DATE OF SERVICE: 20
Discharge Plan
 
 
Patient Name: MIREYA BENOIT
Facility: [facility]:[place]
Encounter #: [id number]
Medical Record #: [id number]
: 1960
Planned Disposition: 
Anticipated Discharge Date: 
 
Discharge Date: 2020
Expected LOS: 
Initial Reviewer: [id number]
Initial Review Date: 2020
Generated: 20   1:50 pm 
DCP- Discharge Planning
 
Updated by [id number]: Mya Butler on 20   8:59 am CT
Patient Name: MIREYA BENOIT                                    
Admission Status: Elective  
Accout number: [id number]                             
Admission Date: 2020  
: 1960                                                       
Admission Diagnosis:ABSCESS OF THE BREAST AND NIPPLE  
Attending: JOAQUINA BARFIELD                                               
Current LOS:  3  
 
Anticipated DC Date:   
Planned Disposition:   
Primary Insurance: [email or web address]  
 
 
Discharge Planning Comments: CM met with patient at bedside after explaining 
CM role and obtaining verbal consent. CM discussed availability / needs of 
home health, REHAB and medical equipment. WILL NEED HH WITH WOUND CARE AT 
TIME OF DC, [facility] SIGNED FOR [facility]. CM WILL FOLLOW AS NEEDED.  
 
 
 
 
 
 
 
: Mya Butler
 DCPIA - Discharge Planning Initial Assessment
 
Updated by [id number]: Mya Butler on 20   9:58 am
*  Is the patient Alert and Oriented?
Yes
*  PCP
MINDY
*  Pharmacy
[facility]
*  Preadmission Environment
Home with Family
*  ADLs
Independent
*  Other Equipment
CPAP, GLUCOMETER, CANE, WALKER
*  Additional services required to return to the preadmission environment?
Yes
*  Can the patient safely return to the preadmission environment?
Yes
*  Has this patient been hospitalized within the prior 30 days at any 
hospital?
No
 
External Providers
[facility]
 
Next Contact Date: 
Service Request Date: 
Service Type: 
Resolution: 
 
Reviewer: 
Comments: 
 
 
 
 
Coverage Notice
 
Reviewer: [id number] - Mya Butler
 
Notice Issued Date-Time: 2020   9:59
Notice Type: Patient Choice Letter
 
Notice Delivered To: Patient
Relationship to Patient: 
Representative Name: 
 
 
Delivery Method: HAND - Hand Delivered
Elba Days:
Prior Verbal Notification: 
 
Recipient Understood Notice: Yes
Recipient Signature: Yes
Med Rec Note Co-signed by Attending:
 
Coverage Notice Comment:  [facility]
Reviewer: [id number] Butler
 
Notice Issued Date-Time: 2020   9:59
Notice Type: IM Discharge Notice
 
Notice Delivered To: Patient
Relationship to Patient: 
Representative Name: 
 
Delivery Method: HAND - Hand Delivered
Elba Days:
Prior Verbal Notification: 
 
Recipient Understood Notice: Yes
Recipient Signature: Yes
Med Rec Note Co-signed by Attending:
 
Coverage Notice Comment:  
Reviewer: [id number] Butler
 
Notice Issued Date-Time: 2020   9:48
Notice Type: IM Discharge Notice
 
Notice Delivered To: Patient
Relationship to Patient: 
Representative Name: 
 
Delivery Method: HAND - Hand Delivered
Elba Days:
Prior Verbal Notification: 
 
Recipient Understood Notice: Yes
Recipient Signature: Yes
Med Rec Note Co-signed by Attending:
 
Coverage Notice Comment:  
 
Last DP export: 20   9:02 am
Patient Name: MIREYA BENOIT
 
Encounter #: [id number]
Page [id number]
 
 
 
 
 
Electronically Signed by TEO FIELDS on 20 at 1251
 
 
 
 
 
 
**All edits/amendments must be made on the electronic document**
 
DICTATION DATE: 20 1250     : ROSALES  20 1250     
RPT#: [id number]                                DC DATE:20
                                               STATUS: DIS IN  
[id number] [place] [address]
***END OF REPORT***
CASE MANAGEMENT DISCHARGE SUMMARY
 
 
PATIENT: MIREYA BENOIT                  UNIT: [id number]
ACCOUNT#: [id number]                       ADM DATE: 20
AGE: 59     : 64  SEX: F            ROOM/BED: D.2232    
AUTHOR: TEO FIELDS                             PHYSICIAN:                               
 
REFERRING PHYSICIAN: JOAQUINA BARFIELD MD    
DATE OF SERVICE: 20
Discharge Plan
 
 
Patient Name: MIREYA BENOIT
Facility: [facility]:[place]
Encounter #: [id number]
Medical Record #: [id number]
: 1960
Planned Disposition: 
Anticipated Discharge Date: 
 
Discharge Date: 
Expected LOS: 
Initial Reviewer: [email or web address]
Initial Review Date: 2020
Generated: 20  11:02 am 
Comments
 
DCP- Discharge Planning
 
Updated by [id number]: May Butler on 20   8:59 am CT
Patient Name: MIREYA BENOIT                                    
Admission Status: Elective  
Accout number: [id number]                             
Admission Date: 2020  
: 1960                                                       
Admission Diagnosis:ABSCESS OF THE BREAST AND NIPPLE  
Attending: JOAQUINA BARFIELD                                               
Current LOS:  3  
 
Anticipated DC Date:   
Planned Disposition:   
[facility]  
 
 
Discharge Planning Comments: CM met with patient at bedside after explaining 
CM role and obtaining verbal consent. CM discussed availability / needs of 
home health, REHAB and medical equipment. WILL NEED HH WITH WOUND CARE AT 
TIME OF DC, [facility] SIGNED FOR [facility]. CM WILL FOLLOW AS NEEDED.  
 
 
 
 
 
 
 
: Mya Butler
 DCPIA - Discharge Planning Initial Assessment
 
Updated by [id number]: Mya Butler on 20   9:58 am
*  Is the patient Alert and Oriented?
Yes
*  PCP
MINDY
*  Pharmacy
[facility]
*  Preadmission Environment
Home with Family
*  ADLs
Independent
*  Other Equipment
CPAP, GLUCOMETER, CANE, WALKER
*  Additional services required to return to the preadmission environment?
Yes
*  Can the patient safely return to the preadmission environment?
Yes
*  Has this patient been hospitalized within the prior 30 days at any 
hospital?
No
 
External Providers
External Provider: [facility]
 
Next Contact Date: 
Service Request Date: 
Service Type: 
Resolution: 
 
Reviewer: 
Comments: 
 
 
 
 
 
Patient Name: MIREYA BENOIT
 
Encounter #: [id number]
Page [id number]
 
 
 
 
 
Electronically Signed by TEO FIELDS on 20 at 1002
 
 
 
 
 
 
**All edits/amendments must be made on the electronic document**
 
DICTATION DATE: 20     : ROSALES  20 1002     
RPT#: [id number]                                DC DATE:        
                                               STATUS: ADM IN  
[facility]
 [place] [address]
***END OF REPORT***
Have Your Skin Lesions Been Treated?: not been treated
Is This A New Presentation, Or A Follow-Up?: Skin Lesion
How Severe Is Your Skin Lesion?: mild

## 2022-12-22 ENCOUNTER — PATIENT OUTREACH (OUTPATIENT)
Dept: FAMILY MEDICINE CLINIC | Facility: CLINIC | Age: 62
End: 2022-12-22

## 2022-12-22 NOTE — PROGRESS NOTES
Incoming /  Kumar Ice Calls:  12/22/2022    CMOC received a call from pt stating that she spoke with a [employer] rep and was informed she has been reopened for MA and SNAP benefits however her EBT card only states she has $12 in food stamps available to her  CMOC did facilitate a three way call to [employer] to inquire about status of EBT benefits  We were informed pt did receive her food stamps for this month in the amount of $281 on 12/2/22  Pt did acknowledge this  We were also informed pt will receive $139 on the 10th of every month, however for as long as the government still provides the [facility] emergency allotment pt may receive an additional payment on the 25th of every month for $142 beginning in January  Pt expressed understanding  [employer] rep also confirmed pt has her MA reinstated with [employer] as of yesterday  CMOC to forward insurance information to  to be added to pt chart  [facility] inquired about waiver benefits and were informed we need to call PA RASHIDB  CMOC did facilitate this call to LANRE COSME and were informed we would need to call [facility] and speak to pt's prior   CMOC did facilitate this call as well and left a detailed voice message requesting a call in return  [facility] was able to also request Priya's (SC) email and [facility] did send a message to request case is reinstated for waiver services  CMOC to f/u  Next outreach is scheduled for 12/29/2022

## 2022-12-27 NOTE — PROGRESS NOTES
Assessment/Plan:    ESRD (end stage renal disease) ([facility] )  Lab Results   Component Value Date    EGFR 11 12/12/2022    EGFR 13 12/11/2022    EGFR 19 08/26/2022    CREATININE 3 91 (H) 12/12/2022    CREATININE 3 48 (H) 12/11/2022    CREATININE 2 61 (H) 08/26/2022   Mayank Brink is a pleasant 77-year-old woman right-hand-dominant with end-stage renal disease currently dialyzing Tuesday, Thursday, and Saturdays via a right chest wall catheter who is referred to the office to discuss dialysis access creation  Her vein mapping suggest suitable left cephalic vein  Recommend left upper extremity AV fistula, possible graft  The procedure, risk, benefits, alternatives, and anticipated postoperative course were discussed in detail  All questions were addressed to her satisfaction  Patient was agreeable to proceed  Written consent was obtained  Diagnoses and all orders for this visit:    ESRD (end stage renal disease) ([place] [address] )  -     Case request operating room: CREATION FISTULA ARTERIOVENOUS (AV); Standing  -     CBC and Platelet; Future  -     Basic metabolic panel; Future  -     Ambulatory referral to Cardiology; Future  -     Case request operating room: CREATION FISTULA ARTERIOVENOUS (AV)    End stage renal disease ([place] [address] )  -     Ambulatory Referral to Vascular Surgery    Other orders  -     Diet NPO; Sips with meds; Standing  -     Void on call to OR; Standing  -     Insert peripheral IV; Standing  -     Nursing Communication CHG bath, have staff wash entire body (neck down) per pre op bathing protocol  Routine, evening prior to, and day of surgery ; Standing  -     Nursing Communication Swab both nares with Povidone-Iodine solution, EXCLUDE if patient has shellfish/Iodine allergy   Routine, day of surgery, on call to OR ; Standing  -     chlorhexidine (PERIDEX) 0 12 % oral rinse 15 mL  -     Place sequential compression device; Standing  -     chlorhexidine (PERIDEX) 0 12 % oral rinse 15 mL  -     ceFAZolin (ANCEF) IVPB (premix in dextrose) 2,000 mg 50 mL          Subjective:      Patient ID: Jenniffer Addison is a 63 y o  female  Patient present to review VM done on 12/16/22  Patient is currently taking ASA, Atorvastatin and Plavix  Patient goes to dialysis at [facility] on [address]       Patient is a 72-year-old woman who is referred to the office to discuss dialysis access creation  The following portions of the patient's history were reviewed and updated as appropriate: allergies, current medications, past family history, past medical history, past social history, past surgical history and problem list     Review of Systems   Constitutional: Negative  HENT: Negative  Eyes: Negative  Respiratory: Negative  Cardiovascular: Negative  Gastrointestinal: Negative  Endocrine: Negative  Genitourinary: Negative  Musculoskeletal: Negative  Skin: Negative  Allergic/Immunologic: Negative  Neurological: Negative  Hematological: Negative  Psychiatric/Behavioral: Negative  I have personally reviewed the ROS entered by MA and agree as documented  Objective:      /50 (BP Location: Left arm, Patient Position: Sitting, Cuff Size: Adult)   Pulse 65   Ht 5' 8" (1 727 m)   Wt 109 kg (241 lb 3 2 oz)   BMI 36 67 kg/m²          Physical Exam  Constitutional:       General: She is not in acute distress  Appearance: She is well-developed  HENT:      Head: Normocephalic and atraumatic  Eyes:      General: No scleral icterus  Conjunctiva/sclera: Conjunctivae normal    Neck:      Trachea: No tracheal deviation  Cardiovascular:      Rate and Rhythm: Normal rate and regular rhythm  Pulses:           Radial pulses are 2+ on the left side  Heart sounds: Normal heart sounds  Pulmonary:      Effort: Pulmonary effort is normal       Breath sounds: Normal breath sounds  Abdominal:      General: There is no distension  Palpations: Abdomen is soft   There is no mass (no appreciable aortic pulsation/aneurysm)  Tenderness: There is no abdominal tenderness  There is no guarding or rebound  Musculoskeletal:         General: Normal range of motion  Cervical back: Normal range of motion and neck supple  Skin:     General: Skin is warm and dry  Neurological:      Mental Status: She is alert and oriented to person, place, and time  Psychiatric:         Mood and Affect: Mood normal          Behavior: Behavior normal          Thought Content:  Thought content normal          Judgment: Judgment normal          Vein mapping:  FINDINGS:     Right                   Impression  Diameter AP (mm)  PSV (cm/s)    Subclavian                                                   200    Axillary                                                      87    Brachial Artery                                  5 2          74    Dist  Radial Artery                              2 1          57    Dist  Ulnar Artery      tortuous                 2 7          48    Bas Mid Arm                                      5 1                Median Cubital V                                 3 7                Basilic Lower Arm                                6 1                Bas AC                                           3 6                Basilic Upper Forearm                            3 6                Bas Mid Forearm                                  3 2                Basilic Lower Forearm                            2 3                Ceph Upper                                       2 9                Ceph Mid Arm                                     3 1                Cephalic Lower Arm                               5 7                Ceph AC                                          3 8                Cephalic Upper Forearm                           3 0                Ceph Mid Forearm                                 1 4                Cephalic Lower Forearm                           1 9 Left                    Impression  Diameter AP (mm)  PSV (cm/s)    Subclavian                                                    67    Axillary                                                      58    Brachial Artery                                  4 7         100    Dist  Radial Artery                              2 3          94    Dist  Ulnar Artery      tortuous                 2 2         102    Bas Mid Arm                                      5 5                Basilic Lower Arm                                4 3                Bas AC                                           2 8                Basilic Upper Forearm                            1 8                Bas Mid Forearm                                  1 7                Basilic Lower Forearm                            0 9                Ceph Upper                                       4 3                Ceph Mid Arm                                     4 6                Cephalic Lower Arm                               4 2                Ceph AC                                          5 0                Cephalic Upper Forearm                           1 3                Ceph Mid Forearm                                 1 3                Cephalic Lower Forearm                           2 3                         CONCLUSION:     Impression  RIGHT ARM:  The cephalic and basilic veins communicate with the median cubital vein  The cephalic vein is patent and remains >2mm in diameter to the elbow  There are multiple branches off the cephalic noted in the forearm  The basilic vein is patent and remains >2mm in diameter to the elbow  There are multiple branches off the basilic noted in the forearm  The brachial artery measures 5 2mm in its greatest diameter and bifurcates at  the elbow  The subclavian, axillary and brachial arteries are widely patent  The IJV, subclavian, axillary and brachial veins are patent       LEFT ARM:  The cephalic and basilic veins communicate with the median cubital vein  The cephalic vein is patent and remains >2mm in diameter to the proximal  forearm  There are multiple branches off the cephalic noted in the forearm  The basilic vein is patent and remains >2mm in diameter throughout the arm  The brachial artery measures 4 7mm in its greatest diameter and bifurcates at  the elbow  The subclavian, axillary and brachial arteries are widely patent  The IJV, subclavian, axillary and brachial veins are patent           Operative Scheduling Information:    Hospital:  [facility]    Physician:  Sangeetha Saha    Surgery: Creation of left upper extremity fistula, possible graft    Urgency:  Standard    Level:  Level 2: Outpatients to be scheduled for surgery with time dependent medical necessity within 2 weeks    Case Length:  3 hours    Post-op Bed:  Outpatient    OR Table:  Standard    Equipment Needs:  None    Medication Instructions:  Aspirin:   Continue (do not hold)    Hydration:  No    Contrast Allergy:  no

## 2022-12-28 ENCOUNTER — TELEPHONE (OUTPATIENT)
Dept: VASCULAR SURGERY | Facility: CLINIC | Age: 62
End: 2022-12-28

## 2022-12-28 ENCOUNTER — OFFICE VISIT (OUTPATIENT)
Dept: VASCULAR SURGERY | Facility: CLINIC | Age: 62
End: 2022-12-28

## 2022-12-28 VITALS
BODY MASS INDEX: 36.56 KG/M2 | HEART RATE: 65 BPM | DIASTOLIC BLOOD PRESSURE: 50 MMHG | WEIGHT: 241.2 LBS | SYSTOLIC BLOOD PRESSURE: 106 MMHG | HEIGHT: 68 IN

## 2022-12-28 DIAGNOSIS — N18.6 ESRD (END STAGE RENAL DISEASE) (HCC): Primary | ICD-10-CM

## 2022-12-28 DIAGNOSIS — N18.6 END STAGE RENAL DISEASE (HCC): ICD-10-CM

## 2022-12-28 RX ORDER — CHLORHEXIDINE GLUCONATE 0.12 MG/ML
15 RINSE ORAL ONCE
OUTPATIENT
Start: 2022-12-28 | End: 2022-12-28

## 2022-12-28 RX ORDER — CEFAZOLIN SODIUM 2 G/50ML
2000 SOLUTION INTRAVENOUS ONCE
OUTPATIENT
Start: 2022-12-28 | End: 2022-12-28

## 2022-12-28 RX ORDER — CHLORHEXIDINE GLUCONATE 0.12 MG/ML
15 RINSE ORAL EVERY 12 HOURS SCHEDULED
OUTPATIENT
Start: 2022-12-28

## 2022-12-28 NOTE — TELEPHONE ENCOUNTER
REMINDER: Under Reason For Call, comments MUST be formatted as:   (Surgeon's Initials) / (Procedure)      Special Instructions / FYI:     Procedure: Creation of left upper extremity fistula, possible graft    Level: 2 - Route clearance(s) to [facility] Clearance Pool     Allergies: Codeine and Latex    Instructions Given: NO Bowel Prep General Instructions     Dialysis: Yes  Where: [facility] // Days: Tuesday, Thursday, and Saturday    Return Visit Required Prior to Procedure: No     Consent: I certify that patient has signed, printed, timed, and dated their surgery consent  I certify that the patient's LEGAL NAME and DATE OF BIRTH are written in the upper left corner on BOTH sides of the consent  I certify that BOTH sides of the completed surgery consent have been scanned into the patient's Epic chart by myself on 12/28/2022  Yes, I have LABELED the consent in Epic as Consent for Vascular Procedure  For Surgical Clearances     Levels   1-3   ROUTE this encounter to [facility] Clearance Pool (AND)   [facility] Surgery Coordinator Pool     Level   4   ROUTE this encounter to [facility] Surgery Coordinator Pool       HYDRATION CLEARANCES   ONLY ROUTE TO  [facility] Clearance Pool     (1) ONE CLEARANCE NEEDED - Cardiology  Clearance // Clearing Provider's Name (Uday Baeza): Dr Renee Clinton //  Spoke with: Tor Granados  //  Office Contact Information P: [phone] - F: [phone]    Yes, I have ROUTED this encounter to [facility] Surgery Coordinator and/or [facility] Clearance Pool

## 2022-12-28 NOTE — ASSESSMENT & PLAN NOTE
Lab Results   Component Value Date    EGFR 11 12/12/2022    EGFR 13 12/11/2022    EGFR 19 08/26/2022    CREATININE 3 91 (H) 12/12/2022    CREATININE 3 48 (H) 12/11/2022    CREATININE 2 61 (H) 08/26/2022   Polina Manrique is a pleasant 29-year-old woman right-hand-dominant with end-stage renal disease currently dialyzing Tuesday, Thursday, and Saturdays via a right chest wall catheter who is referred to the office to discuss dialysis access creation  Her vein mapping suggest suitable left cephalic vein  Recommend left upper extremity AV fistula, possible graft  The procedure, risk, benefits, alternatives, and anticipated postoperative course were discussed in detail  All questions were addressed to her satisfaction  Patient was agreeable to proceed  Written consent was obtained

## 2022-12-28 NOTE — LETTER
22    Re: Cardiology  Clearance    Patient Name: Wicho Miller   Patient : 1960   Patient MRN: [id number]   Patient Phone: [phone]     Dr Renetta Aceves,    Dr Pascual Carranza, DO is requesting clearance for above patient, prior to proceeding with ligation of acquired arteriovenous (AV) fistula  Patient's procedure will be scheduled at [facility] once clearance has been obtained  Patient will be given general anesthesia  We spoke with your office today regarding clearance request   Sophia Goodson has scheduled patient's clearance appointment for 23 [facility] 10:00 [facility]  Please fax your recommendations, including any medication recommendations, to [phone], attention nursing  Or route your recommendations to Epic pool [facility] Clearance Pool [[id number]]  Please reach out with any questions or concerns      Sincerely,    [facility]

## 2022-12-30 ENCOUNTER — PATIENT OUTREACH (OUTPATIENT)
Dept: FAMILY MEDICINE CLINIC | Facility: CLINIC | Age: 62
End: 2022-12-30

## 2022-12-30 ENCOUNTER — OFFICE VISIT (OUTPATIENT)
Dept: FAMILY MEDICINE CLINIC | Facility: CLINIC | Age: 62
End: 2022-12-30

## 2022-12-30 VITALS
TEMPERATURE: 98.7 F | HEART RATE: 81 BPM | HEIGHT: 68 IN | BODY MASS INDEX: 37.59 KG/M2 | DIASTOLIC BLOOD PRESSURE: 60 MMHG | OXYGEN SATURATION: 97 % | SYSTOLIC BLOOD PRESSURE: 122 MMHG | RESPIRATION RATE: 16 BRPM | WEIGHT: 248 LBS

## 2022-12-30 DIAGNOSIS — I10 PRIMARY HYPERTENSION: Primary | ICD-10-CM

## 2022-12-30 DIAGNOSIS — M25.562 ACUTE PAIN OF BOTH KNEES: ICD-10-CM

## 2022-12-30 DIAGNOSIS — M25.512 CHRONIC LEFT SHOULDER PAIN: ICD-10-CM

## 2022-12-30 DIAGNOSIS — M25.561 ACUTE PAIN OF BOTH KNEES: ICD-10-CM

## 2022-12-30 DIAGNOSIS — G89.29 CHRONIC LEFT SHOULDER PAIN: ICD-10-CM

## 2022-12-30 PROBLEM — L03.011 CELLULITIS OF FINGER OF RIGHT HAND: Status: RESOLVED | Noted: 2020-08-26 | Resolved: 2022-12-30

## 2022-12-30 NOTE — PATIENT INSTRUCTIONS
Stop the losartan all together  Take only 30 mg daily of the isosorbide   Continue torsemide 80 mg daily  Continue carvedilol 25 mg twice a day

## 2022-12-30 NOTE — PROGRESS NOTES
Outgoing Calls:  12/30/2022    [facility] called Priya,  with [facility] to discuss status of pt's waiver services  Voice message and email were sent to her on 12/22/22 and she has not responded to either  [facility] left another detailed voice message requesting a call in return  [facility] called pt to update her and her daughter Nicole Tiwari and Dustin Strange answered the call  Yenny stated that pt was getting dressed as she has a scheduled appointment to see her doctor  Yenny requested this today as she is concerned pt's blood pressure tends to be on the lower side mainly in the evenings  Yenny stated that pt attempted to get up from her bed last night and fell back down and seemed to "be out of it " She stated that pt does not remember this  CMOC provided update on SC and informed her she will f/u next week       Next outreach is scheduled for 1/6/2023 0

## 2022-12-31 PROBLEM — G89.29 CHRONIC LEFT SHOULDER PAIN: Status: ACTIVE | Noted: 2022-12-31

## 2022-12-31 PROBLEM — N17.9 ACUTE RENAL FAILURE SUPERIMPOSED ON CHRONIC KIDNEY DISEASE (HCC): Status: RESOLVED | Noted: 2022-06-22 | Resolved: 2022-12-31

## 2022-12-31 PROBLEM — M25.512 CHRONIC LEFT SHOULDER PAIN: Status: ACTIVE | Noted: 2022-12-31

## 2022-12-31 PROBLEM — R55 SYNCOPE: Status: RESOLVED | Noted: 2022-06-22 | Resolved: 2022-12-31

## 2022-12-31 PROBLEM — N18.9 ACUTE RENAL FAILURE SUPERIMPOSED ON CHRONIC KIDNEY DISEASE (HCC): Status: RESOLVED | Noted: 2022-06-22 | Resolved: 2022-12-31

## 2022-12-31 PROBLEM — M25.562 ACUTE PAIN OF BOTH KNEES: Status: ACTIVE | Noted: 2022-12-31

## 2022-12-31 PROBLEM — M25.561 ACUTE PAIN OF BOTH KNEES: Status: ACTIVE | Noted: 2022-12-31

## 2022-12-31 PROBLEM — L98.491 SKIN ULCER OF HAND, LIMITED TO BREAKDOWN OF SKIN (HCC): Status: RESOLVED | Noted: 2021-02-25 | Resolved: 2022-12-31

## 2022-12-31 PROBLEM — E87.70 VOLUME OVERLOAD: Status: RESOLVED | Noted: 2022-02-10 | Resolved: 2022-12-31

## 2022-12-31 NOTE — ASSESSMENT & PLAN NOTE
Will order x-ray to evaluate further  Likely arthritic  Warm/cool compresses  Stretch throughout the day  Tylenol as needed  Patient agreeable to physical therapy

## 2022-12-31 NOTE — ASSESSMENT & PLAN NOTE
Normal blood pressure today in office, but patient states she has been out of her losartan for a couple days  Also states she usually takes 60 mg of her isosorbide daily, but today she only took one 30 mg tablet so far  Recommend d/c losartan for now and continue taking only 30 mg of isosorbide daily  Continue carvedilol 25 mg twice daily  Continue torsemide 80 mg daily  Patient has a follow up with her PCP next week, 1/5  Continue monitoring blood pressure daily at home  ER precautions given  Discussed keeping this appointment for follow up and recheck

## 2022-12-31 NOTE — PROGRESS NOTES
Name: Wicho Miller      : 1960      MRN: [id number]  Encounter Provider: Alton Alatorre  Encounter Date: 2022   Encounter department: [facility]    Assessment & Plan     1  Primary hypertension  Assessment & Plan:  Normal blood pressure today in office, but patient states she has been out of her losartan for a couple days  Also states she usually takes 60 mg of her isosorbide daily, but today she only took one 30 mg tablet so far  Recommend d/c losartan for now and continue taking only 30 mg of isosorbide daily  Continue carvedilol 25 mg twice daily  Continue torsemide 80 mg daily  Patient has a follow up with her PCP next week,   Continue monitoring blood pressure daily at home  ER precautions given  Discussed keeping this appointment for follow up and recheck  2  Acute pain of both knees  Assessment & Plan: Will order x-rays to rule out fractures  Patient also had a knee replacement of her right knee  Supportive treatment  Warm/cool compresses  Tylenol as needed  Orders:  -     XR knee 3 vw right non injury; Future; Expected date: 2022  -     XR knee 3 vw left non injury; Future; Expected date: 2022    3  Chronic left shoulder pain  Assessment & Plan: Will order x-ray to evaluate further  Likely arthritic  Warm/cool compresses  Stretch throughout the day  Tylenol as needed  Patient agreeable to physical therapy  Orders:  -     XR shoulder 2+ vw left; Future; Expected date: 2022  -     Ambulatory Referral to Physical Therapy; Future         Subjective      Patient is a 63year old female presenting for a same day appointment  She is concerned about low blood pressure  States it has been getting lower and lower  States last night it was 70s/50s  This caused her to fall  She landed on her knees then her side  Denies head trauma  States she has been having knee pain since  Admits to swelling   Denies redness or bruising  Denies numbness, tingling, or weakness  She is also complaining of left shoulder pain  This has been going on for a long time  Denies redness, swelling, or bruising  Denies numbness, tingling, or weakness  No injury or trauma  States she has difficulty moving the arm due to pain in the shoulder  She has not been doing anything for the pain  Denies chest pain, palpitations, and SOB  She has edema in her LE, but her water pill helps  She has been compliant with her dialysis treatments  She attends T, TH, and Sat  Review of Systems   Constitutional: Negative for appetite change, chills, diaphoresis, fatigue, fever and unexpected weight change  Respiratory: Negative for cough, chest tightness, shortness of breath and wheezing  Cardiovascular: Positive for leg swelling  Negative for chest pain and palpitations  Gastrointestinal: Negative for abdominal pain, diarrhea, nausea and vomiting  Musculoskeletal: Positive for arthralgias, gait problem, joint swelling and myalgias  Negative for back pain  Skin: Negative for rash  Neurological: Positive for light-headedness  Negative for dizziness, weakness, numbness and headaches  Hematological: Negative for adenopathy         Current Outpatient Medications on File Prior to Visit   Medication Sig   • allopurinol (ZYLOPRIM) 300 mg tablet Take 1 tablet (300 mg total) by mouth daily   • aspirin (ECOTRIN LOW STRENGTH) 81 mg EC tablet Take 1 tablet (81 mg total) by mouth daily   • atorvastatin (LIPITOR) 40 mg tablet Take 1 tablet (40 mg total) by mouth daily   • carvedilol (COREG) 25 mg tablet Take 1 tablet (25 mg total) by mouth 2 (two) times a day with meals   • citalopram (CeleXA) 40 mg tablet Take 1 tablet (40 mg total) by mouth daily   • clopidogrel (PLAVIX) 75 mg tablet Take 1 tablet (75 mg total) by mouth daily   • docusate sodium (COLACE) 50 mg capsule Take 1 capsule (50 mg total) by mouth 2 (two) times a day   • Dulaglutide (Trulicity) 1 5 MG/0 5ML SOPN Inject 0 5 mL (1 5 mg total) under the skin once a week   • Incontinence Supplies (Urinary Drainage Bag) MISC Attach one bag to suprapubic catheter as needed   • Incontinence Supplies (Urinary Leg Bag) KIT Attach one bag to suprapubic catheter morales as needed   • Insulin Glargine Solostar (Lantus SoloStar) 100 UNIT/ML SOPN Inject 0 25 mL (25 Units total) under the skin every 12 (twelve) hours   • insulin lispro (HumaLOG) 100 units/mL injection pen Inject 10 Units under the skin 3 (three) times a day with meals   • Insulin Pen Needle (BD Pen Needle Micro U/F) 32G X 6 MM MISC Use 5 (five) times a day   • Insulin Syringe-Needle U-100 (BD Veo Insulin Syringe U/F) 31G X 15/64" 0 5 ML MISC Inject under the skin 3 (three) times a day   • isosorbide mononitrate (IMDUR) 30 mg 24 hr tablet Take 1 tablet (30 mg total) by mouth every evening   • ketoconazole (NIZORAL) 2 % cream Apply to suprapubic catheter site twice daily  • Lancets (OneTouch Delica Plus [id number]) MISC USE TO TEST 3 TIMES DAILY   • levothyroxine 50 mcg tablet Take 1 tablet (50 mcg total) by mouth daily   • losartan (COZAAR) 25 mg tablet TAKE 1 TABLET (25 MG TOTAL) BY MOUTH DAILY  • naloxone (NARCAN) 4 mg/0 1 mL nasal spray Administer 1 spray into a nostril  If breathing does not return to normal or if breathing difficulty resumes after 2-3 minutes, give another dose in the other nostril using a new spray     • nitroglycerin (NITROSTAT) 0 4 mg SL tablet Place 1 tablet (0 4 mg total) under the tongue every 5 (five) minutes as needed for chest pain   • nystatin (MYCOSTATIN) powder Apply topically 2 (two) times a day   • OLANZapine (ZyPREXA) 5 mg tablet TAKE 1 TABLET BY MOUTH AT BEDTIME   • ondansetron (ZOFRAN-ODT) 4 mg disintegrating tablet Take 1 tablet (4 mg total) by mouth every 8 (eight) hours as needed for nausea or vomiting   • OneTouch Ultra test strip USE 1 EACH DAILY USE AS INSTRUCTED   • oxyCODONE (ROXICODONE) 5 immediate release tablet Take 1 tablet (5 mg total) by mouth every 12 (twelve) hours as needed for severe pain Max Daily Amount: 10 mg   • pantoprazole (PROTONIX) 40 mg tablet Take 1 tablet (40 mg total) by mouth daily   • simethicone (MYLICON,GAS-X) 112 MG capsule Take 1 capsule (180 mg total) by mouth every 8 (eight) hours   • tiZANidine (ZANAFLEX) 4 mg tablet Take 1 tablet (4 mg total) by mouth every 8 (eight) hours as needed for muscle spasms   • Torsemide 40 MG TABS Take 80 mg by mouth daily   • [DISCONTINUED] oxygen gas Inhale 2 L/min continuous  Indications: Respiratory Failure       Objective     /60 (BP Location: Right arm, Patient Position: Sitting, Cuff Size: Large)   Pulse 81   Temp 98 7 °F (37 1 °C) (Temporal)   Resp 16   Ht 5' 8" (1 727 m)   Wt 112 kg (248 lb)   SpO2 97%   Breastfeeding No   BMI 37 71 kg/m²     Physical Exam  Vitals and nursing note reviewed  Constitutional:       General: She is awake  She is not in acute distress  Appearance: Normal appearance  She is well-developed and well-groomed  She is obese  She is not ill-appearing  HENT:      Head: Normocephalic and atraumatic  Cardiovascular:      Rate and Rhythm: Normal rate and regular rhythm  Pulses: Normal pulses  Heart sounds: Normal heart sounds  No murmur heard  Comments: Nonpitting edema  Pulmonary:      Effort: Pulmonary effort is normal  No respiratory distress  Breath sounds: Normal breath sounds and air entry  No decreased air movement  No decreased breath sounds, wheezing, rhonchi or rales  Chest:      Comments: Port right upper chest  Abdominal:      General: Abdomen is flat  Bowel sounds are normal  There is no distension  Palpations: Abdomen is soft  There is no mass  Tenderness: There is no abdominal tenderness  There is no right CVA tenderness, left CVA tenderness, guarding or rebound  Hernia: No hernia is present     Musculoskeletal:      Right shoulder: Normal       Left shoulder: Tenderness (generalized) and bony tenderness present  No swelling, deformity, effusion, laceration or crepitus  Decreased range of motion (due to pain)  Normal strength  Normal pulse  Cervical back: Neck supple  Right lower leg: Edema present  Left lower leg: Edema present  Comments: Generalized tenderness of bilateral knees  No erythema edema, and ecchymosis  Exam limited due to pain  Lymphadenopathy:      Cervical: No cervical adenopathy  Skin:     General: Skin is warm  Coloration: Skin is not jaundiced  Findings: No rash  Neurological:      General: No focal deficit present  Mental Status: She is alert and oriented to person, place, and time  Mental status is at baseline  Gait: Gait abnormal (antalgic, uses cane)  Psychiatric:         Attention and Perception: Attention normal          Mood and Affect: Mood and affect normal          Speech: Speech normal          Behavior: Behavior normal  Behavior is cooperative  Thought Content:  Thought content normal          Cognition and Memory: Cognition normal        Cyndee Lou PA-C

## 2022-12-31 NOTE — ASSESSMENT & PLAN NOTE
Will order x-rays to rule out fractures  Patient also had a knee replacement of her right knee  Supportive treatment  Warm/cool compresses  Tylenol as needed

## 2023-01-01 NOTE — PROGRESS NOTES
I called the patient to follow up  She states she has been feeling well  She was happy to report her blood sugars have been stable with this morning's fasting reading of 113  She notes the highest she had was 191 but this was not fasting; it was prior to bedtime  She also reports a fasting of 88 and was asymptomatic  The patient has been checking her feet daily for skin breakdown and denies any currently  The patient reports her weight from this morning of 285 and yesterday's was 286  The patient notes slight extremity edema and has been elevating her legs often  She denies chest pain and reports shortness of breath which she attributed to anxiety (this occurred one time when she was on the phone with a nurse)  She denies shortness of breath with exertion, stating she needs to take her time doing activities  The patient states she has been exercising which I congratulated her on and provided encouragement  The patient is aware she will be needing the flu shot  She has not yet received her shingles vaccine  The patient notes her flight leaves at 6 am tomorrow and she will return 10/5  I wished her a happy birthday and will follow up when she returns from vacation  The patient states she has enough meds for her trip 
Ampicillin

## 2023-01-03 ENCOUNTER — PATIENT OUTREACH (OUTPATIENT)
Dept: FAMILY MEDICINE CLINIC | Facility: CLINIC | Age: 63
End: 2023-01-03

## 2023-01-03 NOTE — PROGRESS NOTES
Incoming / Ida Batters Calls:  1/3/2023    CMOC did receive a call from Jeremías Franklin U  64  at [facility] [employer]  She stated that she is aware pt is in need of waiver services however has never reinstated someone after services were cancelled  She agreed to email her supervisor today and ask how to go about reinstating waiver services for pt  CMOC informed her [employer] and [employer] were called and both stated that pt should not need to reapply as services have been canceled two months ago  Priya agreed to call [facility] with any updates by tomorrow  CMOC called pt and updated her on this  Pt expressed understanding  Next outreach is scheduled for 1/4/2023

## 2023-01-04 ENCOUNTER — PATIENT OUTREACH (OUTPATIENT)
Dept: FAMILY MEDICINE CLINIC | Facility: CLINIC | Age: 63
End: 2023-01-04

## 2023-01-04 NOTE — PROGRESS NOTES
I called the patient to remind her of her PCP appointment for tomorrow and Cards on 1/6; times provided  I also reminded her to get x-rays performed that were ordered on her 12/30 appointment  The patient states she is feeling well  I will continue to follow

## 2023-01-05 ENCOUNTER — OFFICE VISIT (OUTPATIENT)
Dept: FAMILY MEDICINE CLINIC | Facility: CLINIC | Age: 63
End: 2023-01-05

## 2023-01-05 ENCOUNTER — PATIENT OUTREACH (OUTPATIENT)
Dept: FAMILY MEDICINE CLINIC | Facility: CLINIC | Age: 63
End: 2023-01-05

## 2023-01-05 VITALS
TEMPERATURE: 96.6 F | HEART RATE: 81 BPM | WEIGHT: 241.3 LBS | RESPIRATION RATE: 18 BRPM | DIASTOLIC BLOOD PRESSURE: 84 MMHG | BODY MASS INDEX: 36.57 KG/M2 | HEIGHT: 68 IN | SYSTOLIC BLOOD PRESSURE: 146 MMHG | OXYGEN SATURATION: 92 %

## 2023-01-05 DIAGNOSIS — G89.4 CHRONIC PAIN SYNDROME: ICD-10-CM

## 2023-01-05 DIAGNOSIS — Z79.4 TYPE 2 DIABETES MELLITUS WITH CHRONIC KIDNEY DISEASE ON CHRONIC DIALYSIS, WITH LONG-TERM CURRENT USE OF INSULIN (HCC): ICD-10-CM

## 2023-01-05 DIAGNOSIS — F11.20 CONTINUOUS OPIOID DEPENDENCE (HCC): ICD-10-CM

## 2023-01-05 DIAGNOSIS — T23.231A SECOND DEGREE BURN OF TWO OR MORE DIGITS OF RIGHT HAND, INITIAL ENCOUNTER: ICD-10-CM

## 2023-01-05 DIAGNOSIS — E11.42 TYPE 2 DIABETES MELLITUS WITH DIABETIC POLYNEUROPATHY, WITH LONG-TERM CURRENT USE OF INSULIN (HCC): Primary | ICD-10-CM

## 2023-01-05 DIAGNOSIS — I10 PRIMARY HYPERTENSION: ICD-10-CM

## 2023-01-05 DIAGNOSIS — N18.6 ESRD (END STAGE RENAL DISEASE) ON DIALYSIS (HCC): ICD-10-CM

## 2023-01-05 DIAGNOSIS — Z79.4 TYPE 2 DIABETES MELLITUS WITH STAGE 5 CHRONIC KIDNEY DISEASE NOT ON CHRONIC DIALYSIS, WITH LONG-TERM CURRENT USE OF INSULIN (HCC): ICD-10-CM

## 2023-01-05 DIAGNOSIS — E11.22 TYPE 2 DIABETES MELLITUS WITH STAGE 5 CHRONIC KIDNEY DISEASE NOT ON CHRONIC DIALYSIS, WITH LONG-TERM CURRENT USE OF INSULIN (HCC): ICD-10-CM

## 2023-01-05 DIAGNOSIS — Z11.4 ENCOUNTER FOR SCREENING FOR HIV: ICD-10-CM

## 2023-01-05 DIAGNOSIS — E27.8 ADRENAL NODULE (HCC): ICD-10-CM

## 2023-01-05 DIAGNOSIS — Z79.4 TYPE 2 DIABETES MELLITUS WITH FOOT ULCER, WITH LONG-TERM CURRENT USE OF INSULIN (HCC): ICD-10-CM

## 2023-01-05 DIAGNOSIS — Z23 ENCOUNTER FOR IMMUNIZATION: ICD-10-CM

## 2023-01-05 DIAGNOSIS — L97.509 TYPE 2 DIABETES MELLITUS WITH FOOT ULCER, WITH LONG-TERM CURRENT USE OF INSULIN (HCC): ICD-10-CM

## 2023-01-05 DIAGNOSIS — E66.01 MORBID OBESITY WITH BMI OF 45.0-49.9, ADULT (HCC): ICD-10-CM

## 2023-01-05 DIAGNOSIS — K21.9 GASTROESOPHAGEAL REFLUX DISEASE WITHOUT ESOPHAGITIS: ICD-10-CM

## 2023-01-05 DIAGNOSIS — N25.81 SECONDARY HYPERPARATHYROIDISM OF RENAL ORIGIN (HCC): Primary | ICD-10-CM

## 2023-01-05 DIAGNOSIS — N18.5 TYPE 2 DIABETES MELLITUS WITH STAGE 5 CHRONIC KIDNEY DISEASE NOT ON CHRONIC DIALYSIS, WITH LONG-TERM CURRENT USE OF INSULIN (HCC): ICD-10-CM

## 2023-01-05 DIAGNOSIS — E11.22 TYPE 2 DIABETES MELLITUS WITH CHRONIC KIDNEY DISEASE ON CHRONIC DIALYSIS, WITH LONG-TERM CURRENT USE OF INSULIN (HCC): ICD-10-CM

## 2023-01-05 DIAGNOSIS — Z79.4 TYPE 2 DIABETES MELLITUS WITH DIABETIC POLYNEUROPATHY, WITH LONG-TERM CURRENT USE OF INSULIN (HCC): Primary | ICD-10-CM

## 2023-01-05 DIAGNOSIS — N18.6 ESRD (END STAGE RENAL DISEASE) (HCC): ICD-10-CM

## 2023-01-05 DIAGNOSIS — E03.9 ACQUIRED HYPOTHYROIDISM: ICD-10-CM

## 2023-01-05 DIAGNOSIS — I20.8 STABLE ANGINA (HCC): ICD-10-CM

## 2023-01-05 DIAGNOSIS — N18.6 TYPE 2 DIABETES MELLITUS WITH CHRONIC KIDNEY DISEASE ON CHRONIC DIALYSIS, WITH LONG-TERM CURRENT USE OF INSULIN (HCC): ICD-10-CM

## 2023-01-05 DIAGNOSIS — Z99.2 DEPENDENCE ON RENAL DIALYSIS (HCC): ICD-10-CM

## 2023-01-05 DIAGNOSIS — Z99.2 TYPE 2 DIABETES MELLITUS WITH CHRONIC KIDNEY DISEASE ON CHRONIC DIALYSIS, WITH LONG-TERM CURRENT USE OF INSULIN (HCC): ICD-10-CM

## 2023-01-05 DIAGNOSIS — I50.42 CHRONIC COMBINED SYSTOLIC AND DIASTOLIC CONGESTIVE HEART FAILURE (HCC): ICD-10-CM

## 2023-01-05 DIAGNOSIS — N18.6 END STAGE RENAL DISEASE (HCC): ICD-10-CM

## 2023-01-05 DIAGNOSIS — Z78.9 HEPATITIS B VACCINATION STATUS UNKNOWN: ICD-10-CM

## 2023-01-05 DIAGNOSIS — Z93.59 OTHER CYSTOSTOMY STATUS (HCC): ICD-10-CM

## 2023-01-05 DIAGNOSIS — E53.8 VITAMIN B12 DEFICIENCY: ICD-10-CM

## 2023-01-05 DIAGNOSIS — F33.2 SEVERE EPISODE OF RECURRENT MAJOR DEPRESSIVE DISORDER, WITHOUT PSYCHOTIC FEATURES (HCC): ICD-10-CM

## 2023-01-05 DIAGNOSIS — E11.621 TYPE 2 DIABETES MELLITUS WITH FOOT ULCER, WITH LONG-TERM CURRENT USE OF INSULIN (HCC): ICD-10-CM

## 2023-01-05 DIAGNOSIS — Z99.2 ESRD (END STAGE RENAL DISEASE) ON DIALYSIS (HCC): ICD-10-CM

## 2023-01-05 DIAGNOSIS — M25.50 POLYARTHRALGIA: ICD-10-CM

## 2023-01-05 DIAGNOSIS — Z89.422 ACQUIRED ABSENCE OF OTHER LEFT TOE(S) (HCC): ICD-10-CM

## 2023-01-05 DIAGNOSIS — I10 HYPERTENSION, UNSPECIFIED TYPE: ICD-10-CM

## 2023-01-05 DIAGNOSIS — I50.42 CHRONIC COMBINED SYSTOLIC (CONGESTIVE) AND DIASTOLIC (CONGESTIVE) HEART FAILURE (HCC): ICD-10-CM

## 2023-01-05 PROBLEM — R22.0 HEAD LUMP: Status: RESOLVED | Noted: 2020-11-11 | Resolved: 2023-01-05

## 2023-01-05 PROBLEM — Z86.2 HISTORY OF ANEMIA DUE TO CHRONIC KIDNEY DISEASE: Status: RESOLVED | Noted: 2022-06-23 | Resolved: 2023-01-05

## 2023-01-05 PROBLEM — F41.9 ANXIETY: Status: RESOLVED | Noted: 2020-09-02 | Resolved: 2023-01-05

## 2023-01-05 PROBLEM — I95.9 HYPOTENSION: Status: RESOLVED | Noted: 2022-06-22 | Resolved: 2023-01-05

## 2023-01-05 PROBLEM — M17.12 OSTEOARTHRITIS OF LEFT KNEE: Status: RESOLVED | Noted: 2020-09-02 | Resolved: 2023-01-05

## 2023-01-05 PROBLEM — N31.9 NEUROGENIC BLADDER: Status: RESOLVED | Noted: 2021-08-23 | Resolved: 2023-01-05

## 2023-01-05 PROBLEM — G89.29 CHRONIC PAIN OF BOTH KNEES: Status: ACTIVE | Noted: 2022-12-31

## 2023-01-05 PROBLEM — R79.89 ELEVATED TROPONIN I LEVEL: Status: RESOLVED | Noted: 2022-06-22 | Resolved: 2023-01-05

## 2023-01-05 PROBLEM — R79.89 ABNORMAL LFTS: Status: RESOLVED | Noted: 2020-09-09 | Resolved: 2023-01-05

## 2023-01-05 PROBLEM — R77.8 ELEVATED TROPONIN I LEVEL: Status: RESOLVED | Noted: 2022-06-22 | Resolved: 2023-01-05

## 2023-01-05 PROBLEM — M25.512 CHRONIC LEFT SHOULDER PAIN: Status: RESOLVED | Noted: 2022-12-31 | Resolved: 2023-01-05

## 2023-01-05 PROBLEM — R41.3 MEMORY IMPAIRMENT: Status: RESOLVED | Noted: 2021-05-26 | Resolved: 2023-01-05

## 2023-01-05 PROBLEM — L97.515 DIABETIC ULCER OF TOE OF RIGHT FOOT ASSOCIATED WITH TYPE 2 DIABETES MELLITUS, WITH MUSCLE INVOLVEMENT WITHOUT EVIDENCE OF NECROSIS (HCC): Status: RESOLVED | Noted: 2020-11-25 | Resolved: 2023-01-05

## 2023-01-05 PROBLEM — R00.1 BRADYCARDIA: Status: RESOLVED | Noted: 2022-06-23 | Resolved: 2023-01-05

## 2023-01-05 PROBLEM — E87.1 HYPONATREMIA: Status: RESOLVED | Noted: 2022-06-23 | Resolved: 2023-01-05

## 2023-01-05 PROBLEM — G89.29 CHRONIC LEFT SHOULDER PAIN: Status: RESOLVED | Noted: 2022-12-31 | Resolved: 2023-01-05

## 2023-01-05 PROBLEM — N18.9 HISTORY OF ANEMIA DUE TO CHRONIC KIDNEY DISEASE: Status: RESOLVED | Noted: 2022-06-23 | Resolved: 2023-01-05

## 2023-01-05 PROBLEM — R11.0 NAUSEA: Status: RESOLVED | Noted: 2022-08-25 | Resolved: 2023-01-05

## 2023-01-05 LAB — SL AMB POCT HEMOGLOBIN AIC: 6.7 (ref ?–6.5)

## 2023-01-05 RX ORDER — OXYCODONE HYDROCHLORIDE 5 MG/1
5 TABLET ORAL EVERY 8 HOURS PRN
Qty: 90 TABLET | Refills: 0 | Status: SHIPPED | OUTPATIENT
Start: 2023-01-05

## 2023-01-05 RX ORDER — CYANOCOBALAMIN 1000 UG/ML
1000 INJECTION, SOLUTION INTRAMUSCULAR; SUBCUTANEOUS
Status: SHIPPED | OUTPATIENT
Start: 2023-01-05

## 2023-01-05 RX ORDER — CYANOCOBALAMIN
1000 KIT
Qty: 6 ML | Refills: 0 | Status: SHIPPED | OUTPATIENT
Start: 2023-01-05

## 2023-01-05 RX ORDER — CALCITRIOL 0.25 UG/1
0.25 CAPSULE, LIQUID FILLED ORAL 3 TIMES WEEKLY
Qty: 36 CAPSULE | Refills: 3
Start: 2023-01-05

## 2023-01-05 RX ADMIN — CYANOCOBALAMIN 1000 MCG: 1000 INJECTION, SOLUTION INTRAMUSCULAR; SUBCUTANEOUS at 16:51

## 2023-01-05 NOTE — PROGRESS NOTES
Outgoing Calls:  1/5/2023    CMOC received a voice message from pt's previous SC, Priya at [facility] stating that she is unable to restore pt's waiver services as [employer] has pt listed as [place] and needs to be switched to [facility]  CMOC called pt and informed her of this  CMOC did facilitate a three way call to [employer] to inform of this  We were informed they would need to contact [employer] to initiate waiver  [employer] rep did facilitate a call to [employer] to request a fast reopen and we were informed pt would need to reapply  CMOC was able to schedule a phone interview for pt on 1/10/23 between 1-3PM  We were informed more than likely pt will not need to complete all six steps required to apply waiver services  Pt expressed understanding  Next outreach is scheduled for 1/11/2023

## 2023-01-05 NOTE — ASSESSMENT & PLAN NOTE
Wt Readings from Last 3 Encounters:   01/05/23 109 kg (241 lb 4 8 oz)   12/30/22 112 kg (248 lb)   12/28/22 109 kg (241 lb 3 2 oz)     Weight stable, Euvolemic on exam today  Follow up with cardiology

## 2023-01-05 NOTE — ASSESSMENT & PLAN NOTE
BP slightly above goal today, patient reports that she does not take BP meds on dialysis days  On review of recent encounters BP appears to be appropriate, she does not have home readings and denies dizziness  Recommend continue with current regimen, does have follow up tomorrow with cardiology

## 2023-01-05 NOTE — ASSESSMENT & PLAN NOTE
Lab Results   Component Value Date    HGBA1C 6 7 (A) 01/05/2023     Continue current regimen, Lantus 25 units bid and Humalog 10 units tid  Does not have CGM, does not have appointment with endo   Will ask clinical team to assist with checking coverage of CGM and referral team to schedule endo appointment

## 2023-01-05 NOTE — ASSESSMENT & PLAN NOTE
Lab Results   Component Value Date    [id number] 4 430 08/03/2021     Continue current dose: levothyroxine 50 mcg daily  Repeat level with next labs

## 2023-01-05 NOTE — ASSESSMENT & PLAN NOTE
Lab Results   Component Value Date    WBC 10 36 (H) 12/12/2022    HGB 11 7 12/12/2022    HCT 35 6 12/12/2022     (H) 12/12/2022     12/12/2022     Lab Results   Component Value Date    IRON 40 (L) 07/16/2022    TIBC 240 (L) 07/16/2022    FERRITIN 59 07/16/2022     Continue to monitor   B12 level ordered

## 2023-01-06 ENCOUNTER — HOME HEALTH ADMISSION (OUTPATIENT)
Dept: HOME HEALTH SERVICES | Facility: HOME HEALTHCARE | Age: 63
End: 2023-01-06
Payer: COMMERCIAL

## 2023-01-06 NOTE — TELEPHONE ENCOUNTER
Pt was n/s for clearance apt this am w/ cardiology, called pt, she apologized for forgetting apt and would like to r/s, transferred her to [facility] cardiology to r/s

## 2023-01-07 ENCOUNTER — HOME CARE VISIT (OUTPATIENT)
Dept: HOME HEALTH SERVICES | Facility: HOME HEALTHCARE | Age: 63
End: 2023-01-07

## 2023-01-07 NOTE — CASE COMMUNICATION
Spoke with pt today 1 7  to arrange SN visit for [facility] but pt is a dialysis and prefers another day     Pt on schedule for 1 8 23

## 2023-01-08 ENCOUNTER — HOME CARE VISIT (OUTPATIENT)
Dept: HOME HEALTH SERVICES | Facility: HOME HEALTHCARE | Age: 63
End: 2023-01-08

## 2023-01-08 VITALS
HEART RATE: 62 BPM | RESPIRATION RATE: 16 BRPM | TEMPERATURE: 96.4 F | SYSTOLIC BLOOD PRESSURE: 136 MMHG | OXYGEN SATURATION: 97 % | DIASTOLIC BLOOD PRESSURE: 64 MMHG

## 2023-01-08 NOTE — CASE COMMUNICATION
SPC Site WNL unable to see size of catheter numbers rubbed off  Do you want the [facility] SN to change catheter every 5-6 weeks? Ok to flush [facility] with with 30-60 ml NSS as need for blockage  An ok to change PRN for blockage?

## 2023-01-08 NOTE — CASE COMMUNICATION
[facility] has admitted your patient to [address] [place] [address] service with the following disciplines:      SN, PT, OT and MSW  Response needed, please respond via : In basket or Jailyn  Primary focus of home health care: Cardiac  Patient stated goals of care: Improve mobility and strength and health  Anticipated visit pattern: 3w1 2w3 1w1 and next visit date: 1/11/23 HTN SPC Med management  See medication list - meds in home differ fro m AVS: Patient is missing and needs a script called in for the following: glucometer, lancets, test strips, Allopurinol 300 mg daily, Aspirin 81 mg daily, Nitrostat, Zyprexa 5 mg HS, and Trulicity 1 5 mg weekly  - Patient is taking the following differently then the AVS: Calcitriol taking at HD, Colace PRN, Lantus 55 units evening, Ketoconazol ABD fold BID PRN, Protonix 40 mg BID, and Vit B-12 in office    - Patient is taking the follo wing in addition to the AVS: Benydryl 25 mg 4 tablets HS (for years), Vitamin D3 2000 IU daily, and B Complex with Vitamin C daily  Significant clinical findings:  Potential barriers to goal achievement: unsafe bed   Other pertinent information: MSW for assistance with advnced directives and new bed  Patient missed recent cardiology appointment, patient will reschedule  Patient still needs to get X-rays completed  Patient interes lexi in switching to delivered bubble packs for medications  Thank you for allowing us to participate in the care of your patient        Zaira Musa RN

## 2023-01-09 ENCOUNTER — TELEPHONE (OUTPATIENT)
Dept: CARDIOLOGY CLINIC | Facility: CLINIC | Age: 63
End: 2023-01-09

## 2023-01-09 ENCOUNTER — HOME CARE VISIT (OUTPATIENT)
Dept: HOME HEALTH SERVICES | Facility: HOME HEALTHCARE | Age: 63
End: 2023-01-09

## 2023-01-09 VITALS — DIASTOLIC BLOOD PRESSURE: 58 MMHG | SYSTOLIC BLOOD PRESSURE: 108 MMHG | OXYGEN SATURATION: 98 % | HEART RATE: 49 BPM

## 2023-01-09 VITALS — OXYGEN SATURATION: 98 % | SYSTOLIC BLOOD PRESSURE: 108 MMHG | DIASTOLIC BLOOD PRESSURE: 56 MMHG | HEART RATE: 49 BPM

## 2023-01-10 ENCOUNTER — HOME CARE VISIT (OUTPATIENT)
Dept: HOME HEALTH SERVICES | Facility: HOME HEALTHCARE | Age: 63
End: 2023-01-10

## 2023-01-10 NOTE — CASE COMMUNICATION
In basket message from Soheila Paiz LPN to Kendy Eric RN 1/10/23  Private insurances do not cover syringes, saline and only cover one catheter and one insertion kit a month

## 2023-01-10 NOTE — CASE COMMUNICATION
In basket message from Rodney Jaramillo MD to Tierra Linder RN 1/9/23  Thank you for the message  24 Fr spt is ideal size  Should be changed every 5-6 weeks or more frequently prn  Can be flushed as needed  Thank you

## 2023-01-10 NOTE — CASE COMMUNICATION
Ship to - Pt   Home     Insurance-[employer]  REP/[employer] MEDICARE COMPLETE  REP    24 fr 10 ml cath -2  Syringe 60cc [id number] -2   Syringe 10cc [id number] -2    10cc cath tray [id number] -2    Drain bag 2000cc [id number] -1   Leg bag large [id number] -1   Cath secure leg strap [id number] -1   Statlock  NOT STOCKED 330620 -6   Saline 100ml 572563-6

## 2023-01-11 ENCOUNTER — HOME CARE VISIT (OUTPATIENT)
Dept: HOME HEALTH SERVICES | Facility: HOME HEALTHCARE | Age: 63
End: 2023-01-11

## 2023-01-11 ENCOUNTER — TELEPHONE (OUTPATIENT)
Dept: FAMILY MEDICINE CLINIC | Facility: CLINIC | Age: 63
End: 2023-01-11

## 2023-01-11 ENCOUNTER — PATIENT OUTREACH (OUTPATIENT)
Dept: FAMILY MEDICINE CLINIC | Facility: CLINIC | Age: 63
End: 2023-01-11

## 2023-01-11 VITALS — SYSTOLIC BLOOD PRESSURE: 170 MMHG | DIASTOLIC BLOOD PRESSURE: 74 MMHG | OXYGEN SATURATION: 97 % | HEART RATE: 60 BPM

## 2023-01-11 VITALS
HEART RATE: 80 BPM | DIASTOLIC BLOOD PRESSURE: 68 MMHG | OXYGEN SATURATION: 100 % | SYSTOLIC BLOOD PRESSURE: 106 MMHG | RESPIRATION RATE: 18 BRPM | TEMPERATURE: 98 F

## 2023-01-11 VITALS — DIASTOLIC BLOOD PRESSURE: 70 MMHG | SYSTOLIC BLOOD PRESSURE: 142 MMHG | HEART RATE: 60 BPM

## 2023-01-11 NOTE — PROGRESS NOTES
Outgoing Call:  1/11/2023    [facility] completed a chart review and checked on status of pt's waiver application via [employer] website  Status states application is still pending and are waiting for PC form to be completed by PCP  CMOC checked in pt's chart and did not see any faxed PC forms from [employer]  [facility] completed pt demographics on new PC form and walked forms over to Purvi Gleason  She agreed to work on this  CMOC to f/u  CMOC did call pt and updated her on this  Pt confirmed she had her reevaluation completed to request waiver services again  She informed [facility] she was with her nurse and could not be on the phone too long  [facility] informed she will reach out within a week  Next outreach is scheduled for 1/18/2023

## 2023-01-11 NOTE — TELEPHONE ENCOUNTER
PCP SIGNATURE NEEDED FOR [employer] FORM RECEIVED VIA FAX AND PLACED IN PCP FOLDER TO BE DELIVERED AT ASSIGNED TIMES

## 2023-01-13 ENCOUNTER — TELEPHONE (OUTPATIENT)
Dept: PSYCHIATRY | Facility: CLINIC | Age: 63
End: 2023-01-13

## 2023-01-13 ENCOUNTER — HOME CARE VISIT (OUTPATIENT)
Dept: HOME HEALTH SERVICES | Facility: HOME HEALTHCARE | Age: 63
End: 2023-01-13

## 2023-01-13 VITALS
SYSTOLIC BLOOD PRESSURE: 152 MMHG | OXYGEN SATURATION: 96 % | RESPIRATION RATE: 18 BRPM | TEMPERATURE: 97 F | HEART RATE: 74 BPM | DIASTOLIC BLOOD PRESSURE: 80 MMHG

## 2023-01-13 DIAGNOSIS — F32.A ANXIETY AND DEPRESSION: Primary | ICD-10-CM

## 2023-01-13 DIAGNOSIS — F41.9 ANXIETY AND DEPRESSION: Primary | ICD-10-CM

## 2023-01-13 NOTE — CASE COMMUNICATION
I saw this pt for a home visit today  Pt continues to have elevated blood pressure  Today /90 and 180/82 while sitting  Once pt was in a standing position her blood pressure was 152/80  Pt asymptomatic at time of assessment  Pt does report a slight headache at times  Pt reports compliance with medications  Pt is also asking about glucometer as she is unable to check blood sugar at this time  Thank you

## 2023-01-16 ENCOUNTER — LAB REQUISITION (OUTPATIENT)
Dept: LAB | Facility: HOSPITAL | Age: 63
End: 2023-01-16

## 2023-01-16 ENCOUNTER — HOME CARE VISIT (OUTPATIENT)
Dept: HOME HEALTH SERVICES | Facility: HOME HEALTHCARE | Age: 63
End: 2023-01-16

## 2023-01-16 ENCOUNTER — TELEPHONE (OUTPATIENT)
Dept: PSYCHIATRY | Facility: CLINIC | Age: 63
End: 2023-01-16

## 2023-01-16 VITALS — OXYGEN SATURATION: 99 % | DIASTOLIC BLOOD PRESSURE: 90 MMHG | SYSTOLIC BLOOD PRESSURE: 184 MMHG | HEART RATE: 67 BPM

## 2023-01-16 VITALS
OXYGEN SATURATION: 97 % | HEART RATE: 70 BPM | SYSTOLIC BLOOD PRESSURE: 158 MMHG | DIASTOLIC BLOOD PRESSURE: 82 MMHG | TEMPERATURE: 96.4 F | RESPIRATION RATE: 18 BRPM

## 2023-01-16 DIAGNOSIS — N18.6 END STAGE RENAL DISEASE (HCC): ICD-10-CM

## 2023-01-16 DIAGNOSIS — Z11.4 ENCOUNTER FOR SCREENING FOR HUMAN IMMUNODEFICIENCY VIRUS (HIV): ICD-10-CM

## 2023-01-16 DIAGNOSIS — E03.9 HYPOTHYROIDISM, UNSPECIFIED: ICD-10-CM

## 2023-01-16 LAB
ALBUMIN SERPL BCP-MCNC: 3.5 G/DL (ref 3.5–5)
ALP SERPL-CCNC: 147 U/L (ref 46–116)
ALT SERPL W P-5'-P-CCNC: 33 U/L (ref 12–78)
ANION GAP SERPL CALCULATED.3IONS-SCNC: 9 MMOL/L (ref 4–13)
AST SERPL W P-5'-P-CCNC: 30 U/L (ref 5–45)
BASOPHILS # BLD AUTO: 0.05 THOUSANDS/ÂΜL (ref 0–0.1)
BASOPHILS NFR BLD AUTO: 1 % (ref 0–1)
BILIRUB SERPL-MCNC: 0.52 MG/DL (ref 0.2–1)
BUN SERPL-MCNC: 35 MG/DL (ref 5–25)
CALCIUM SERPL-MCNC: 9.2 MG/DL (ref 8.3–10.1)
CHLORIDE SERPL-SCNC: 101 MMOL/L (ref 96–108)
CO2 SERPL-SCNC: 32 MMOL/L (ref 21–32)
CREAT SERPL-MCNC: 2.25 MG/DL (ref 0.6–1.3)
EOSINOPHIL # BLD AUTO: 0.17 THOUSAND/ÂΜL (ref 0–0.61)
EOSINOPHIL NFR BLD AUTO: 2 % (ref 0–6)
ERYTHROCYTE [DISTWIDTH] IN BLOOD BY AUTOMATED COUNT: 13.6 % (ref 11.6–15.1)
GFR SERPL CREATININE-BSD FRML MDRD: 23 ML/MIN/1.73SQ M
GLUCOSE SERPL-MCNC: 103 MG/DL (ref 65–140)
HBV SURFACE AB SER-ACNC: <3.1 MIU/ML
HCT VFR BLD AUTO: 39.9 % (ref 34.8–46.1)
HGB BLD-MCNC: 13.1 G/DL (ref 11.5–15.4)
IMM GRANULOCYTES # BLD AUTO: 0.02 THOUSAND/UL (ref 0–0.2)
IMM GRANULOCYTES NFR BLD AUTO: 0 % (ref 0–2)
LYMPHOCYTES # BLD AUTO: 1.36 THOUSANDS/ÂΜL (ref 0.6–4.47)
LYMPHOCYTES NFR BLD AUTO: 19 % (ref 14–44)
MCH RBC QN AUTO: 33.5 PG (ref 26.8–34.3)
MCHC RBC AUTO-ENTMCNC: 32.8 G/DL (ref 31.4–37.4)
MCV RBC AUTO: 102 FL (ref 82–98)
MONOCYTES # BLD AUTO: 0.48 THOUSAND/ÂΜL (ref 0.17–1.22)
MONOCYTES NFR BLD AUTO: 7 % (ref 4–12)
NEUTROPHILS # BLD AUTO: 5.16 THOUSANDS/ÂΜL (ref 1.85–7.62)
NEUTS SEG NFR BLD AUTO: 71 % (ref 43–75)
NRBC BLD AUTO-RTO: 0 /100 WBCS
PLATELET # BLD AUTO: 233 THOUSANDS/UL (ref 149–390)
PMV BLD AUTO: 9.2 FL (ref 8.9–12.7)
POTASSIUM SERPL-SCNC: 3.9 MMOL/L (ref 3.5–5.3)
PROT SERPL-MCNC: 7.3 G/DL (ref 6.4–8.4)
RBC # BLD AUTO: 3.91 MILLION/UL (ref 3.81–5.12)
SODIUM SERPL-SCNC: 142 MMOL/L (ref 135–147)
TSH SERPL DL<=0.05 MIU/L-ACNC: 2.36 UIU/ML (ref 0.45–4.5)
WBC # BLD AUTO: 7.24 THOUSAND/UL (ref 4.31–10.16)

## 2023-01-17 ENCOUNTER — HOME CARE VISIT (OUTPATIENT)
Dept: HOME HEALTH SERVICES | Facility: HOME HEALTHCARE | Age: 63
End: 2023-01-17

## 2023-01-17 ENCOUNTER — PATIENT OUTREACH (OUTPATIENT)
Dept: FAMILY MEDICINE CLINIC | Facility: CLINIC | Age: 63
End: 2023-01-17

## 2023-01-17 ENCOUNTER — HOSPITAL ENCOUNTER (EMERGENCY)
Facility: HOSPITAL | Age: 63
Discharge: HOME | End: 2023-01-17
Attending: STUDENT IN AN ORGANIZED HEALTH CARE EDUCATION/TRAINING PROGRAM

## 2023-01-17 VITALS
WEIGHT: 252 LBS | BODY MASS INDEX: 38.32 KG/M2 | RESPIRATION RATE: 20 BRPM | SYSTOLIC BLOOD PRESSURE: 149 MMHG | HEART RATE: 61 BPM | TEMPERATURE: 98.7 F | DIASTOLIC BLOOD PRESSURE: 79 MMHG | OXYGEN SATURATION: 96 %

## 2023-01-17 DIAGNOSIS — R53.83 FATIGUE, UNSPECIFIED TYPE: ICD-10-CM

## 2023-01-17 DIAGNOSIS — Z99.2 TYPE 2 DIABETES MELLITUS WITH CHRONIC KIDNEY DISEASE ON CHRONIC DIALYSIS, WITH LONG-TERM CURRENT USE OF INSULIN (HCC): Primary | ICD-10-CM

## 2023-01-17 DIAGNOSIS — N18.6 TYPE 2 DIABETES MELLITUS WITH CHRONIC KIDNEY DISEASE ON CHRONIC DIALYSIS, WITH LONG-TERM CURRENT USE OF INSULIN (HCC): Primary | ICD-10-CM

## 2023-01-17 DIAGNOSIS — E11.22 TYPE 2 DIABETES MELLITUS WITH CHRONIC KIDNEY DISEASE ON CHRONIC DIALYSIS, WITH LONG-TERM CURRENT USE OF INSULIN (HCC): Primary | ICD-10-CM

## 2023-01-17 DIAGNOSIS — R11.0 NAUSEA: Primary | ICD-10-CM

## 2023-01-17 DIAGNOSIS — Z79.4 TYPE 2 DIABETES MELLITUS WITH CHRONIC KIDNEY DISEASE ON CHRONIC DIALYSIS, WITH LONG-TERM CURRENT USE OF INSULIN (HCC): Primary | ICD-10-CM

## 2023-01-17 LAB
ALBUMIN SERPL BCP-MCNC: 3.7 G/DL (ref 3.5–5)
ALP SERPL-CCNC: 113 U/L (ref 43–122)
ALT SERPL W P-5'-P-CCNC: 23 U/L
ANION GAP SERPL CALCULATED.3IONS-SCNC: 6 MMOL/L (ref 5–14)
AST SERPL W P-5'-P-CCNC: 27 U/L (ref 14–36)
ATRIAL RATE: 61 BPM
BACTERIA UR QL AUTO: ABNORMAL /HPF
BASOPHILS # BLD AUTO: 0.04 THOUSANDS/ÂΜL (ref 0–0.1)
BASOPHILS NFR BLD AUTO: 1 % (ref 0–1)
BILIRUB SERPL-MCNC: 0.4 MG/DL (ref 0.2–1)
BILIRUB UR QL STRIP: NEGATIVE
BUN SERPL-MCNC: 40 MG/DL (ref 5–25)
CALCIUM SERPL-MCNC: 8.6 MG/DL (ref 8.4–10.2)
CHLORIDE SERPL-SCNC: 101 MMOL/L (ref 96–108)
CLARITY UR: ABNORMAL
CO2 SERPL-SCNC: 30 MMOL/L (ref 21–32)
COLOR UR: YELLOW
CREAT SERPL-MCNC: 2.38 MG/DL (ref 0.6–1.2)
EOSINOPHIL # BLD AUTO: 0.11 THOUSAND/ÂΜL (ref 0–0.61)
EOSINOPHIL NFR BLD AUTO: 1 % (ref 0–6)
ERYTHROCYTE [DISTWIDTH] IN BLOOD BY AUTOMATED COUNT: 13.2 % (ref 11.6–15.1)
FINE GRAN CASTS URNS QL MICRO: ABNORMAL /LPF
FLUAV RNA RESP QL NAA+PROBE: NEGATIVE
FLUBV RNA RESP QL NAA+PROBE: NEGATIVE
GFR SERPL CREATININE-BSD FRML MDRD: 21 ML/MIN/1.73SQ M
GLUCOSE SERPL-MCNC: 65 MG/DL (ref 70–99)
GLUCOSE UR STRIP-MCNC: NEGATIVE MG/DL
HCT VFR BLD AUTO: 36.8 % (ref 34.8–46.1)
HGB BLD-MCNC: 12.1 G/DL (ref 11.5–15.4)
HGB UR QL STRIP.AUTO: 50
HIV 1+2 AB+HIV1 P24 AG SERPL QL IA: NORMAL
HIV 2 AB SERPL QL IA: NORMAL
HIV1 AB SERPL QL IA: NORMAL
HIV1 P24 AG SERPL QL IA: NORMAL
HYALINE CASTS #/AREA URNS LPF: ABNORMAL /LPF
IMM GRANULOCYTES # BLD AUTO: 0.03 THOUSAND/UL (ref 0–0.2)
IMM GRANULOCYTES NFR BLD AUTO: 0 % (ref 0–2)
KETONES UR STRIP-MCNC: NEGATIVE MG/DL
LEUKOCYTE ESTERASE UR QL STRIP: 100
LIPASE SERPL-CCNC: <10 U/L (ref 23–300)
LYMPHOCYTES # BLD AUTO: 1.29 THOUSANDS/ÂΜL (ref 0.6–4.47)
LYMPHOCYTES NFR BLD AUTO: 15 % (ref 14–44)
MAGNESIUM SERPL-MCNC: 2 MG/DL (ref 1.6–2.3)
MCH RBC QN AUTO: 33.4 PG (ref 26.8–34.3)
MCHC RBC AUTO-ENTMCNC: 32.9 G/DL (ref 31.4–37.4)
MCV RBC AUTO: 102 FL (ref 82–98)
MONOCYTES # BLD AUTO: 0.87 THOUSAND/ÂΜL (ref 0.17–1.22)
MONOCYTES NFR BLD AUTO: 10 % (ref 4–12)
NEUTROPHILS # BLD AUTO: 6.51 THOUSANDS/ÂΜL (ref 1.85–7.62)
NEUTS SEG NFR BLD AUTO: 73 % (ref 43–75)
NITRITE UR QL STRIP: NEGATIVE
NON-SQ EPI CELLS URNS QL MICRO: ABNORMAL /HPF
NRBC BLD AUTO-RTO: 0 /100 WBCS
OTHER STN SPEC: ABNORMAL
P AXIS: 30 DEGREES
PH UR STRIP.AUTO: 6 [PH]
PLATELET # BLD AUTO: 204 THOUSANDS/UL (ref 149–390)
PMV BLD AUTO: 8.6 FL (ref 8.9–12.7)
POTASSIUM SERPL-SCNC: 3.8 MMOL/L (ref 3.5–5.3)
PR INTERVAL: 160 MS
PROT SERPL-MCNC: 6.6 G/DL (ref 6.4–8.4)
PROT UR STRIP-MCNC: >=500 MG/DL
QRS AXIS: -7 DEGREES
QRSD INTERVAL: 128 MS
QT INTERVAL: 494 MS
QTC INTERVAL: 497 MS
RBC # BLD AUTO: 3.62 MILLION/UL (ref 3.81–5.12)
RBC #/AREA URNS AUTO: ABNORMAL /HPF
RSV RNA RESP QL NAA+PROBE: NEGATIVE
SARS-COV-2 RNA RESP QL NAA+PROBE: NEGATIVE
SODIUM SERPL-SCNC: 137 MMOL/L (ref 135–147)
SP GR UR STRIP.AUTO: 1.01 (ref 1–1.04)
T WAVE AXIS: 92 DEGREES
UROBILINOGEN UA: NEGATIVE MG/DL
VENTRICULAR RATE: 61 BPM
WBC # BLD AUTO: 8.85 THOUSAND/UL (ref 4.31–10.16)
WBC #/AREA URNS AUTO: ABNORMAL /HPF

## 2023-01-17 RX ORDER — LANCETS 33 GAUGE
EACH MISCELLANEOUS 3 TIMES DAILY
Qty: 100 EACH | Refills: 6 | Status: SHIPPED | OUTPATIENT
Start: 2023-01-17 | End: 2023-04-26

## 2023-01-17 RX ORDER — BLOOD-GLUCOSE METER
EACH MISCELLANEOUS DAILY
Qty: 1 KIT | Refills: 0 | Status: SHIPPED | OUTPATIENT
Start: 2023-01-17 | End: 2023-04-26

## 2023-01-17 RX ORDER — BLOOD SUGAR DIAGNOSTIC
STRIP MISCELLANEOUS
Qty: 100 STRIP | Refills: 6 | Status: SHIPPED | OUTPATIENT
Start: 2023-01-17 | End: 2023-04-26

## 2023-01-17 RX ORDER — ONDANSETRON 4 MG/1
4 TABLET, FILM COATED ORAL EVERY 8 HOURS PRN
Qty: 12 TABLET | Refills: 0 | Status: SHIPPED | OUTPATIENT
Start: 2023-01-17

## 2023-01-17 RX ORDER — ONDANSETRON 2 MG/ML
4 INJECTION INTRAMUSCULAR; INTRAVENOUS ONCE
Status: COMPLETED | OUTPATIENT
Start: 2023-01-17 | End: 2023-01-17

## 2023-01-17 RX ADMIN — ONDANSETRON 4 MG: 2 INJECTION INTRAMUSCULAR; INTRAVENOUS at 10:47

## 2023-01-17 NOTE — PROGRESS NOTES
I returned Yenny's call  She stated the patient is not herself and she is concerned  Yenny notes the patient was complaining of being hot and cold; temp was checked and there was no fever  The patient is also very fatigued with nausea and body aches  Jayme Chung denies the patient having any episodes of vomiting  She notes the patient's catheter is draining clear urine  Blood sugar could not be checked as the patient does not have a glucometer  I suggested to Jayme Chung for the patient to go to the ED  The patient is too weak and Jayme Chung states she will call the ambulance  The patient was due for a dialysis session this morning; Yenny will call to cancel  I will follow up once discharged

## 2023-01-17 NOTE — DISCHARGE INSTRUCTIONS
You were seen today for nausea  You had reassuring labs, and no signs of infection  You felt better after medicine and are being sent with a prescription for nausea medicine  Follow-up with your dialysis clinic to determine when you are able to get back in for dialysis  Return to the emergency department if you develop chest pain, difficulty breathing, fevers, or difficulty keeping food/fluids down

## 2023-01-17 NOTE — PROGRESS NOTES
I received a call from [place] stating the patient called her and she is being planned for discharge  [place] is not satisfied with this and that the patient missed her dialysis session  I encouraged her to call the patient and ask to speak to a nurse to further discuss

## 2023-01-17 NOTE — ED NOTES
Dialysis catheter in right upper chest intact, pt due for dialysis today     Rudy Gil RN  01/17/23 1002

## 2023-01-17 NOTE — ED PROVIDER NOTES
History  Chief Complaint   Patient presents with   • Weakness - Generalized     And nausea since yesterday     19-year-old female PMH significant for ESRD on HD here for evaluation of fatigue and nausea  Symptoms started yesterday  No vomiting, but states she feels like if she eats she will be sick to her stomach  Is on dialysis Tuesday Thursday Saturday, due today, no missed sessions prior to today  No fevers, no headache, no dizziness, no urinary complaints  Denies any diarrhea  Prior to Admission Medications   Prescriptions Last Dose Informant Patient Reported? Taking?    Blood Glucose Monitoring Suppl (OneTouch Verio) w/Device KIT   No No   Sig: Use in the morning   Cyanocobalamin (Vitamin Deficiency System-B12) 1000 MCG/ML KIT   No No   Sig: Inject 1 mL (1,000 mcg total) into a muscle every 30 (thirty) days   Dulaglutide (Trulicity) 1 5 NF/7 2AQ SOPN   No No   Sig: Inject 0 5 mL (1 5 mg total) under the skin once a week   Incontinence Supplies (Urinary Drainage Bag) MISC   Yes No   Sig: Attach one bag to suprapubic catheter as needed   Incontinence Supplies (Urinary Leg Bag) KIT   Yes No   Sig: Attach one bag to suprapubic catheter morales as needed   Insulin Glargine Solostar (Lantus SoloStar) 100 UNIT/ML SOPN   No No   Sig: Inject 0 25 mL (25 Units total) under the skin every 12 (twelve) hours   Insulin Pen Needle (BD Pen Needle Micro U/F) 32G X 6 MM MISC   No No   Sig: Use 5 (five) times a day   Insulin Syringe-Needle U-100 (BD Veo Insulin Syringe U/F) 31G X 15/64" 0 5 ML MISC   No No   Sig: Inject under the skin 3 (three) times a day   Lancets (OneTouch Delica Plus RYNABR44I) MISC   No No   Sig: USE TO TEST 3 TIMES DAILY   OLANZapine (ZyPREXA) 5 mg tablet   No No   Sig: TAKE 1 TABLET BY MOUTH AT BEDTIME   OneTouch Delica Lancets 90Q MISC   No No   Sig: Use 3 (three) times a day   OneTouch Ultra test strip   No No   Sig: USE 1 EACH DAILY USE AS INSTRUCTED   Torsemide 40 MG TABS   No No   Sig: Take 40 mg by mouth daily   allopurinol (ZYLOPRIM) 300 mg tablet   No No   Sig: Take 1 tablet (300 mg total) by mouth daily   aspirin (ECOTRIN LOW STRENGTH) 81 mg EC tablet   No No   Sig: Take 1 tablet (81 mg total) by mouth daily   atorvastatin (LIPITOR) 40 mg tablet   No No   Sig: Take 1 tablet (40 mg total) by mouth daily   calcitriol (ROCALTROL) 0 25 mcg capsule   No No   Sig: Take 1 capsule (0 25 mcg total) by mouth 3 (three) times a week   carvedilol (COREG) 25 mg tablet   No No   Sig: Take 1 tablet (25 mg total) by mouth 2 (two) times a day with meals   citalopram (CeleXA) 40 mg tablet   No No   Sig: Take 1 tablet (40 mg total) by mouth daily   clopidogrel (PLAVIX) 75 mg tablet   No No   Sig: Take 1 tablet (75 mg total) by mouth daily   docusate sodium (COLACE) 50 mg capsule   No No   Sig: Take 1 capsule (50 mg total) by mouth 2 (two) times a day   glucose blood (OneTouch Verio) test strip   No No   Sig: Use as instructed   insulin lispro (HumaLOG) 100 units/mL injection pen   No No   Sig: INJECT 20 UNITS UNDER THE SKIN 3 (THREE) TIMES A DAY WITH MEALS   isosorbide mononitrate (IMDUR) 30 mg 24 hr tablet   No No   Sig: Take 1 tablet (30 mg total) by mouth every evening   ketoconazole (NIZORAL) 2 % cream   Yes No   Sig: Apply to suprapubic catheter site twice daily  levothyroxine 50 mcg tablet   No No   Sig: Take 1 tablet (50 mcg total) by mouth daily   losartan (COZAAR) 25 mg tablet   No No   Sig: TAKE 1 TABLET (25 MG TOTAL) BY MOUTH DAILY  naloxone (NARCAN) 4 mg/0 1 mL nasal spray   No No   Sig: Administer 1 spray into a nostril  If breathing does not return to normal or if breathing difficulty resumes after 2-3 minutes, give another dose in the other nostril using a new spray     nitroglycerin (NITROSTAT) 0 4 mg SL tablet   No No   Sig: Place 1 tablet (0 4 mg total) under the tongue every 5 (five) minutes as needed for chest pain   nystatin (MYCOSTATIN) powder   No No   Sig: Apply topically 2 (two) times a day oxyCODONE (ROXICODONE) 5 immediate release tablet   No No   Sig: Take 1 tablet (5 mg total) by mouth every 8 (eight) hours as needed for severe pain Max Daily Amount: 15 mg   pantoprazole (PROTONIX) 40 mg tablet   No No   Sig: Take 1 tablet (40 mg total) by mouth daily   silver sulfadiazine (SILVADENE,SSD) 1 % cream   No No   Sig: Apply topically daily   tiZANidine (ZANAFLEX) 4 mg tablet   No No   Sig: Take 1 tablet (4 mg total) by mouth every 8 (eight) hours as needed for muscle spasms      Facility-Administered Medications Last Administration Doses Remaining   cyanocobalamin injection 1,000 mcg 1/5/2023  4:51 PM           Past Medical History:   Diagnosis Date   • Abnormal liver function    • Anemia    • Anxiety    • Arthritis    • CHF (congestive heart failure) ([facility])    • Chronic kidney disease     stage 3   • Chronic narcotic dependence ([facility])    • Chronic pain disorder     lower back, hands , neck and knees   • Coronary artery disease    • Depression    • Diabetes mellitus ([facility] )    • Elevated troponin 02/11/2022   • GERD (gastroesophageal reflux disease)     no meds at present   • Heart murmur     murmur   • Hyperlipidemia    • Hypertension    • Neurogenic bladder    • Open toe wound 12/2020    right big toe open calus but is dry at present   • Renal disorder    • Shortness of breath     exertion   • Skin ulcer of hand, limited to breakdown of skin ([facility] ) 02/25/2021   • Sleep apnea     doesn't use cpap   • Suprapubic catheter [facility])        Past Surgical History:   Procedure Laterality Date   • AMPUTATION     • ANGIOPLASTY  2017 5   • BREAST EXCISIONAL BIOPSY Left    • BREAST SURGERY     • CARDIAC CATHETERIZATION     • CARDIAC CATHETERIZATION N/A 7/1/2022    Procedure: Cardiac catheterization;  Surgeon: Chandan Aquino MD;  Location: [facility]; Service: Cardiology   • CARDIAC CATHETERIZATION N/A 7/1/2022    Procedure: Cardiac pci;  Surgeon: Chandan Aquino MD;  Location: [facility];   Service: Cardiology   • CARPAL TUNNEL RELEASE Bilateral    • CERVICAL FUSION     • HYSTERECTOMY     • IR SUPRAPUBIC CATHETER PLACEMENT  6/15/2021   • IR TUNNELED DIALYSIS CATHETER PLACEMENT  7/15/2022   • IR TUNNELED DIALYSIS CATHETER PLACEMENT  2022   • KNEE SURGERY     • OOPHORECTOMY     • HI EXC B9 LESION MRGN XCP SK TG S/N/H/F/G 3 1-4 0CM N/A 2020    Procedure: EXCISION SEBACEOUS CYST X 5 SCALP;  Surgeon: Rajan Leung MD;  Location: [address] [place] [address] MAIN OR;  Service: General   • TOE AMPUTATION Left    • TRIGGER FINGER RELEASE Right     4th Finger       Family History   Problem Relation Age of Onset   • Stroke Father    • Heart disease Father    • No Known Problems Mother    • No Known Problems Sister    • No Known Problems Daughter    • No Known Problems Maternal Grandmother    • No Known Problems Maternal Grandfather    • No Known Problems Paternal Grandmother    • No Known Problems Paternal Grandfather    • No Known Problems Maternal Aunt    • No Known Problems Maternal Aunt    • No Known Problems Maternal Aunt    • No Known Problems Maternal Aunt    • No Known Problems Maternal Aunt    • No Known Problems Maternal Aunt    • No Known Problems Paternal Aunt      I have reviewed and agree with the history as documented  E-Cigarette/Vaping   • E-Cigarette Use Never User      E-Cigarette/Vaping Substances   • Nicotine No    • THC No    • CBD No    • Flavoring No    • Other No    • Unknown No      Social History     Tobacco Use   • Smoking status: Former     Packs/day: 1 00     Years: 35 00     Pack years: 35 00     Types: Cigarettes     Quit date:      Years since quittin 0   • Smokeless tobacco: Never   Vaping Use   • Vaping Use: Never used   Substance Use Topics   • Alcohol use: Not Currently   • Drug use: Never       Review of Systems   Constitutional: Positive for appetite change and fatigue  Negative for chills and fever  Respiratory: Negative for shortness of breath      Gastrointestinal: Positive for nausea  Negative for abdominal pain and vomiting  Genitourinary: Negative for hematuria  Skin: Negative for wound  Neurological: Negative for dizziness  Physical Exam  Physical Exam  Vitals and nursing note reviewed  Constitutional:       General: She is not in acute distress  Appearance: She is not ill-appearing  HENT:      Head: Normocephalic and atraumatic  Eyes:      Conjunctiva/sclera: Conjunctivae normal    Cardiovascular:      Rate and Rhythm: Normal rate and regular rhythm  Pulmonary:      Effort: Pulmonary effort is normal  No respiratory distress  Breath sounds: Normal breath sounds  Abdominal:      General: There is no distension  Palpations: Abdomen is soft  Tenderness: There is no abdominal tenderness  Comments: Suprapubic catheter in place, no surrounding erythema or tenderness   Musculoskeletal:      Right lower leg: No edema  Left lower leg: No edema  Skin:     General: Skin is warm and dry  Neurological:      Mental Status: She is alert and oriented to person, place, and time           Vital Signs  ED Triage Vitals [01/17/23 0954]   Temperature Pulse Respirations Blood Pressure SpO2   98 7 °F (37 1 °C) 67 20 (!) 206/100 97 %      Temp Source Heart Rate Source Patient Position - Orthostatic VS BP Location FiO2 (%)   Oral Monitor Sitting Right arm --      Pain Score       --           Vitals:    01/17/23 0954 01/17/23 1045   BP: (!) 206/100 149/79   Pulse: 67 61   Patient Position - Orthostatic VS: Sitting Lying         Visual Acuity      ED Medications  Medications   ondansetron (ZOFRAN) injection 4 mg (4 mg Intravenous Given 1/17/23 1047)       Diagnostic Studies  Results Reviewed     Procedure Component Value Units Date/Time    FLU/RSV/COVID - if FLU/RSV clinically relevant [[id number]]  (Normal) Collected: 01/17/23 1139    Lab Status: Final result Specimen: Nares from Nasopharyngeal Swab Updated: 01/17/23 1223     SARS-CoV-2 Negative     INFLUENZA A PCR Negative     INFLUENZA B PCR Negative     RSV PCR Negative    Narrative:      FOR PEDIATRIC PATIENTS - copy/paste COVID Guidelines URL to browser: [email or web address]  [employer]    SARS-CoV-2 assay is a Nucleic Acid Amplification assay intended for the  qualitative detection of nucleic acid from SARS-CoV-2 in nasopharyngeal  swabs  Results are for the presumptive identification of SARS-CoV-2 RNA  Positive results are indicative of infection with SARS-CoV-2, the virus  causing COVID-19, but do not rule out bacterial infection or co-infection  with other viruses  Laboratories within the [place] and its  territories are required to report all positive results to the appropriate  public health authorities  Negative results do not preclude SARS-CoV-2  infection and should not be used as the sole basis for treatment or other  patient management decisions  Negative results must be combined with  clinical observations, patient history, and epidemiological information  This test has not been FDA cleared or approved  This test has been authorized by FDA under an Emergency Use Authorization  (EUA)  This test is only authorized for the duration of time the  declaration that circumstances exist justifying the authorization of the  emergency use of an in vitro diagnostic tests for detection of SARS-CoV-2  virus and/or diagnosis of COVID-19 infection under section 564(b)(1) of  the Act, 21 U  S C  095UDE-9(Y)(2), unless the authorization is terminated  or revoked sooner  The test has been validated but independent review by FDA  and CLIA is pending  Test performed using [employer] GeneXpert: This RT-PCR assay targets N2,  a region unique to SARS-CoV-2  A conserved region in the E-gene was chosen  for pan-Sarbecovirus detection which includes SARS-CoV-2      According to CMS-2020-01-R, this platform meets the definition of high-throughput technology  Urine Microscopic [[id number]]  (Abnormal) Collected: 01/17/23 1040    Lab Status: Final result Specimen: Urine, Suprapubic catheter Updated: 01/17/23 1106     RBC, UA 1-2 /hpf      WBC, UA 10-20 /hpf      Epithelial Cells Occasional /hpf      Bacteria, UA Innumerable /hpf      Hyaline Casts, UA 0-1 /lpf      Fine granular casts 0-1 /lpf      OTHER OBSERVATIONS Yeast Cells Present    Urine culture [[id number]] Collected: 01/17/23 1040    Lab Status:  In process Specimen: Urine, Suprapubic catheter Updated: 01/17/23 1105    Lipase [[id number]]  (Abnormal) Collected: 01/17/23 1031    Lab Status: Final result Specimen: Blood from Arm, Left Updated: 01/17/23 1101     Lipase <10 u/L     UA w Reflex to Microscopic w Reflex to Culture [[id number]]  (Abnormal) Collected: 01/17/23 1040    Lab Status: Final result Specimen: Urine, Suprapubic catheter Updated: 01/17/23 1058     Color, UA Yellow     Clarity, UA Cloudy     Specific [place], UA 1 010     pH, UA 6 0     Leukocytes,  0     Nitrite, UA Negative     Protein, UA >=500 mg/dl      Glucose, UA Negative mg/dl      Ketones, UA Negative mg/dl      Bilirubin, UA Negative     Occult Blood, UA 50 0     UROBILINOGEN UA Negative mg/dL     Magnesium [[id number]]  (Normal) Collected: 01/17/23 1031    Lab Status: Final result Specimen: Blood from Arm, Left Updated: 01/17/23 1052     Magnesium 2 0 mg/dL     Comprehensive metabolic panel [[id number]]  (Abnormal) Collected: 01/17/23 1031    Lab Status: Final result Specimen: Blood from Arm, Left Updated: 01/17/23 1052     Sodium 137 mmol/L      Potassium 3 8 mmol/L      Chloride 101 mmol/L      CO2 30 mmol/L      ANION GAP 6 mmol/L      BUN 40 mg/dL      Creatinine 2 38 mg/dL      Glucose 65 mg/dL      Calcium 8 6 mg/dL      AST 27 U/L      ALT 23 U/L      Alkaline Phosphatase 113 U/L      Total Protein 6 6 g/dL      Albumin 3 7 g/dL      Total Bilirubin 0 40 mg/dL      eGFR 21 ml/min/1 73sq m     Narrative: National Kidney Disease Foundation guidelines for Chronic Kidney Disease (CKD):   •  Stage 1 with normal or high GFR (GFR > 90 mL/min/1 73 square meters)  •  Stage 2 Mild CKD (GFR = 60-89 mL/min/1 73 square meters)  •  Stage 3A Moderate CKD (GFR = 45-59 mL/min/1 73 square meters)  •  Stage 3B Moderate CKD (GFR = 30-44 mL/min/1 73 square meters)  •  Stage 4 Severe CKD (GFR = 15-29 mL/min/1 73 square meters)  •  Stage 5 End Stage CKD (GFR <15 mL/min/1 73 square meters)  Note: GFR calculation is accurate only with a steady state creatinine    CBC and differential [[id number]]  (Abnormal) Collected: 01/17/23 1031    Lab Status: Final result Specimen: Blood from Arm, Left Updated: 01/17/23 1037     WBC 8 85 Thousand/uL      RBC 3 62 Million/uL      Hemoglobin 12 1 g/dL      Hematocrit 36 8 %       fL      MCH 33 4 pg      MCHC 32 9 g/dL      RDW 13 2 %      MPV 8 6 fL      Platelets 165 Thousands/uL      nRBC 0 /100 WBCs      Neutrophils Relative 73 %      Immat GRANS % 0 %      Lymphocytes Relative 15 %      Monocytes Relative 10 %      Eosinophils Relative 1 %      Basophils Relative 1 %      Neutrophils Absolute 6 51 Thousands/µL      Immature Grans Absolute 0 03 Thousand/uL      Lymphocytes Absolute 1 29 Thousands/µL      Monocytes Absolute 0 87 Thousand/µL      Eosinophils Absolute 0 11 Thousand/µL      Basophils Absolute 0 04 Thousands/µL                  No orders to display              Procedures  ECG 12 Lead Documentation Only    Date/Time: 1/17/2023 10:06 AM  Performed by: Deirdre Garnica MD  Authorized by: Deirdre Garnica MD     Indications / Diagnosis:  Fatigue  ECG reviewed by me, the ED Provider: yes    Patient location:  ED  Interpretation:     Interpretation: abnormal    Rate:     ECG rate:  61    ECG rate assessment: normal    Rhythm:     Rhythm: sinus rhythm    QRS:     QRS axis:  Normal    QRS intervals:  Normal  Comments:      LBBB             ED Course SBIRT 20yo+    Flowsheet Row Most Recent Value   SBIRT (25 yo +)    In order to provide better care to our patients, we are screening all of our patients for alcohol and drug use  Would it be okay to ask you these screening questions? Yes Filed at: 01/17/2023 1003   Initial Alcohol Screen: US AUDIT-C     1  How often do you have a drink containing alcohol? 0 Filed at: 01/17/2023 1003   2  How many drinks containing alcohol do you have on a typical day you are drinking? 0 Filed at: 01/17/2023 1003   3b  FEMALE Any Age, or MALE 65+: How often do you have 4 or more drinks on one occassion? 0 Filed at: 01/17/2023 1003   Audit-C Score 0 Filed at: 01/17/2023 1003   FAISAL: How many times in the past year have you    Used an illegal drug or used a prescription medication for non-medical reasons? Never Filed at: 01/17/2023 1003                    Medical Decision Making  Patient here with nausea and fatigue  She is overall well-appearing  Would consider viral syndrome versus electrolyte derangement versus anemia  Could also be a UTI  Labs, UA, viral swab, EKG  Symptomatic management with Zofran  Ultimately, work-up unremarkable  Unclear etiology of patient's symptoms  She did feel improved after Zofran, and was tolerating p o  in the emergency department  Will send with prescription for same  While she was in the ED, her dialysis team contacted the ED, and patient was recommended to call them after discharge to see if they could fit her in today or tomorrow to catch up on her dialysis that she missed today  Symptomatic management and return precautions were reviewed with the patient and she was comfortable with plan for discharge home  Fatigue, unspecified type: acute illness or injury  Nausea: acute illness or injury  Amount and/or Complexity of Data Reviewed  Labs: ordered       Details: Labs grossly unremarkable, no significant electrolyte derangements, no indications for emergent dialysis  ECG/medicine tests: ordered and independent interpretation performed  Risk  Prescription drug management  Disposition  Final diagnoses:   Nausea   Fatigue, unspecified type     Time reflects when diagnosis was documented in both MDM as applicable and the Disposition within this note     Time User Action Codes Description Comment    1/17/2023 12:40 PM Revonda Seals Add [R11 0] Nausea     1/17/2023 12:41 PM Revonda Seals Add [R53 1] Weakness     1/17/2023 12:41 PM Revonda Seals Remove [R53 1] Weakness     1/17/2023 12:41 PM Revonda Seals Add [R53 83] Fatigue, unspecified type       ED Disposition     ED Disposition   Discharge    Condition   Stable    Date/Time   Tue Jan 17, 2023 [place] discharge to home/self care                 Follow-up Information     Follow up With Specialties Details Why Contact Ara Blanco, [address], Nurse Practitioner Schedule an appointment as soon as possible for a visit in 3 days As needed, If symptoms worsen [address] [facility]  [address]  [facility]             Patient's Medications   Discharge Prescriptions    BLOOD GLUCOSE MONITORING SUPPL Tatianna Story) W/DEVICE KIT    Use in the morning       Start Date: 1/17/2023 End Date: --       Order Dose: --       Quantity: 1 kit    Refills: 0    GLUCOSE BLOOD (ONETOUCH VERIO) TEST STRIP    Use as instructed       Start Date: 1/17/2023 End Date: --       Order Dose: --       Quantity: 100 strip    Refills: 6    ONDANSETRON (ZOFRAN) 4 MG TABLET    Take 1 tablet (4 mg total) by mouth every 8 (eight) hours as needed for nausea or vomiting       Start Date: 1/17/2023 End Date: --       Order Dose: 4 mg       Quantity: 12 tablet    Refills: 0    ONETOUCH DELICA LANCETS 79G MISC    Use 3 (three) times a day       Start Date: 1/17/2023 End Date: --       Order Dose: --       Quantity: 100 each    Refills: 6       No discharge procedures on file     PDMP Review       Value Time User    PDMP Reviewed  Yes 1/5/2023  5:04 PM Devika Lindsey, [facility]           ED Provider  Electronically Signed by           Dee Dee Cespedes MD  01/17/23 [phone]

## 2023-01-18 ENCOUNTER — PATIENT OUTREACH (OUTPATIENT)
Dept: FAMILY MEDICINE CLINIC | Facility: CLINIC | Age: 63
End: 2023-01-18

## 2023-01-18 ENCOUNTER — TELEPHONE (OUTPATIENT)
Dept: FAMILY MEDICINE CLINIC | Facility: CLINIC | Age: 63
End: 2023-01-18

## 2023-01-18 ENCOUNTER — HOME CARE VISIT (OUTPATIENT)
Dept: HOME HEALTH SERVICES | Facility: HOME HEALTHCARE | Age: 63
End: 2023-01-18

## 2023-01-18 VITALS — DIASTOLIC BLOOD PRESSURE: 90 MMHG | OXYGEN SATURATION: 98 % | SYSTOLIC BLOOD PRESSURE: 180 MMHG | HEART RATE: 78 BPM

## 2023-01-18 DIAGNOSIS — I10 HYPERTENSION, UNSPECIFIED TYPE: ICD-10-CM

## 2023-01-18 NOTE — PROGRESS NOTES
I called the patient to follow up after her recent ED visit for weakness, fatigue and nausea  She states PT is currently with her and asked that I call back  I called the patient back  She states she continues to feel fatigued with body aches and decreased appetite  PT did not perform any therapy with the patient as the patient's blood pressure was elevated (after walking BP was 190/90 and after 10 minutes of rest it went down to 178/90)  Of note, patient's BP in the ED was 206/100  The patient states she has been watching her sodium intake  The patient missed dialysis yesterday because of the ED visit but will be going tomorrow  The patient states she sometimes sees Neph during her sessions  I encouraged her to ask to see Neph if they are present tomorrow (patient is scheduled to see Neph 2/6)  The patient has not been checking her blood sugar; daughter will be picking up the glucometer from the pharmacy today (as well as Zofran)  The patient has not eaten anything today but has been drinking water  She notes her catheter is draining clear urine  The patient states she is now taking carvedilol bid and all her medications as prescribed  She did not offer any symptoms as stated in the telephone note of 1:37 today  The patient/daughter will call with any questions or concerns  I provided ED precautions  I will continue to follow  Chart reviewed

## 2023-01-18 NOTE — Clinical Note
Sarwat Arce may benefit from medication delivery and possible bubble packing if SN thinks bubble packing is appropriate  As of PT visit, Sarwat Arce had yet to  Zofran or  glucometer  Has to rely on daughter to  medications  Unclear if Sarwat Arce always tells her daughter when medication needs to be picked up  She mentioned that she forgot about the glucometer  BP continues to be elevated  After ambulation, BP was 190/90   After 10 minute rest break, 178/90  after additional seated rest break of 5 minutes, /90

## 2023-01-18 NOTE — TELEPHONE ENCOUNTER
Pt visiting nurse call the office to let you know that pt BP today was 194/86 and hear rate was 72  P[t c/o headache across forehead and in the back of neck  Pt states she take all her meds at night time  Nurse advise pt that her carvedilol is 2 times a day pt will start taking 2 times a day now

## 2023-01-18 NOTE — PROGRESS NOTES
Outgoing Call:  1/18/2023    [facility] completed chart review and checked on status of waiver application via [place] [employer] website  Status states application is pending as they are waiting on PC form to be completed  [facility] did provide this form to [address] [place] [address] on 1/11/23  [facility] checked pt's chart and did not notice copy of form in chart  [facility] did TT Luana Camp who informed she did forward to Dr Malick Reyes for signature and states should have been placed in [facility] mailbox  Form is not in mailbox  [facility] did message Luana Camp and Dr Malick Reyes  Luana Katzless stated that she will f/u with Malick Reyes tomorrow and update [facility]  [facility] did call pt and informed step 3 of waiver application has been completed and are waiting on PC form  [facility] also informed she may be required to have a second evaluation completed with [employer]  Pt states she does not recall having this done  [facility] agreed to call [employer] tomorrow and look into this  [facility] to f/u  Next outreach is scheduled for 1/19/2023

## 2023-01-19 ENCOUNTER — TELEPHONE (OUTPATIENT)
Dept: FAMILY MEDICINE CLINIC | Facility: CLINIC | Age: 63
End: 2023-01-19

## 2023-01-19 ENCOUNTER — PATIENT OUTREACH (OUTPATIENT)
Dept: FAMILY MEDICINE CLINIC | Facility: CLINIC | Age: 63
End: 2023-01-19

## 2023-01-19 ENCOUNTER — TELEPHONE (OUTPATIENT)
Dept: OTHER | Facility: HOSPITAL | Age: 63
End: 2023-01-19

## 2023-01-19 VITALS
HEART RATE: 72 BPM | OXYGEN SATURATION: 97 % | RESPIRATION RATE: 12 BRPM | SYSTOLIC BLOOD PRESSURE: 194 MMHG | TEMPERATURE: 97.6 F | DIASTOLIC BLOOD PRESSURE: 86 MMHG

## 2023-01-19 DIAGNOSIS — N39.0 URINARY TRACT INFECTION WITHOUT HEMATURIA, SITE UNSPECIFIED: Primary | ICD-10-CM

## 2023-01-19 DIAGNOSIS — I10 PRIMARY HYPERTENSION: Primary | ICD-10-CM

## 2023-01-19 LAB
BACTERIA UR CULT: ABNORMAL
BACTERIA UR CULT: ABNORMAL
DME PARACHUTE DELIVERY DATE ACTUAL: NORMAL
DME PARACHUTE DELIVERY DATE EXPECTED: NORMAL
DME PARACHUTE DELIVERY DATE REQUESTED: NORMAL
DME PARACHUTE ITEM DESCRIPTION: NORMAL
DME PARACHUTE ORDER STATUS: NORMAL
DME PARACHUTE SUPPLIER NAME: NORMAL
DME PARACHUTE SUPPLIER PHONE: NORMAL

## 2023-01-19 RX ORDER — DOXYCYCLINE 100 MG/1
100 TABLET ORAL 2 TIMES DAILY
Qty: 10 TABLET | Refills: 0 | Status: SHIPPED | OUTPATIENT
Start: 2023-01-19 | End: 2023-01-24

## 2023-01-19 RX ORDER — LEVOFLOXACIN 250 MG/1
250 TABLET ORAL DAILY
Qty: 3 TABLET | Refills: 0 | Status: SHIPPED | OUTPATIENT
Start: 2023-01-19 | End: 2023-01-19 | Stop reason: CLARIF

## 2023-01-19 RX ORDER — HYDRALAZINE HYDROCHLORIDE 25 MG/1
25 TABLET, FILM COATED ORAL 3 TIMES DAILY
Qty: 90 TABLET | Refills: 0 | Status: SHIPPED | OUTPATIENT
Start: 2023-01-19 | End: 2023-02-04

## 2023-01-19 NOTE — TELEPHONE ENCOUNTER
Patient aware of urine culture  Antibiotic sent to pharmacy  Follow up with PCP       Patient has kidney disease, will treat with doxy

## 2023-01-19 NOTE — PROGRESS NOTES
Chart Review:  1/19/2023    [facility] received [place] [employer] form completed by Dr Mira Newman  Will fax to [place] [employer] to complete waiver application process  Next outreach is scheduled for 1/20/2023

## 2023-01-19 NOTE — CASE COMMUNICATION
Mary Phipps presented to ED the previous day 2/2 to fatigue and nausea  Seen by PT today, 1/18  She continues to report nausea and a slight headache pain rating 3/10  Reports generalized achiness  After ambulating from front door back to bedroom, BP was 190/90  After 10 minute rest break, /90  After additional 5 minute break, BP was 180/90   PT will continue to monitor BP during sessions

## 2023-01-19 NOTE — PROGRESS NOTES
I called Yenny to inform her PCP is starting a new BP medication  I asked Yenyn to locate their BP kit as she will need to check the patient's BP prior to giving meds (instructions given per PCP note)  The patient did not inform Yenny about picking up Zofran and the glucometer from the pharmacy  Gilmer Romel states the patient often forgets things; I will follow up with Gilmer Romel more often on future calls  Yenny notes the ED failed to remove the patient's IV prior to discharge  She stated she removed it without any issues and she denies the patient's arm being bruised or red  I had a lengthy discussion with Gilmer Urena regarding the patient's diet, asking her to be strict with the patient's sodium intake  Gilmerst Urena is having difficulty trying to keep the patient's diet healthy but it is very costly and Yenny recently lost her job  They do go to a local food [employer] but Gilmer Urena states much of the food is  or moldy  Yenny notes the patient drinks 3 cans of diet Pepsi a day; I asked her to try and cut down on this  I also asked that she avoid lunch meats, hot dogs, canned foods (unless labeled low sodium), etc       Gilmer Urena is anxiously awaiting the outcome of the waiver issue especially since she recently lost her job and having financial strains  Mari, [facility], is assisting with this  I asked Yenny to call with any questions or concerns  I will continue to follow  Of note: patient was at dialysis at the time of this call

## 2023-01-20 ENCOUNTER — HOME CARE VISIT (OUTPATIENT)
Dept: HOME HEALTH SERVICES | Facility: HOME HEALTHCARE | Age: 63
End: 2023-01-20

## 2023-01-20 ENCOUNTER — PATIENT OUTREACH (OUTPATIENT)
Dept: FAMILY MEDICINE CLINIC | Facility: CLINIC | Age: 63
End: 2023-01-20

## 2023-01-20 VITALS — SYSTOLIC BLOOD PRESSURE: 176 MMHG | DIASTOLIC BLOOD PRESSURE: 80 MMHG | HEART RATE: 68 BPM | OXYGEN SATURATION: 98 %

## 2023-01-20 VITALS — OXYGEN SATURATION: 71 % | DIASTOLIC BLOOD PRESSURE: 70 MMHG | SYSTOLIC BLOOD PRESSURE: 154 MMHG | HEART RATE: 98 BPM

## 2023-01-20 NOTE — PROGRESS NOTES
Fax:  1/20/2023    CMOC did fax Physician Certification form to [address] [place] [address] for review to complete waiver application  Fax confirmation received  Next outreach is scheduled for 1/24/2023  How Severe Is This Condition?: moderate

## 2023-01-23 ENCOUNTER — TELEPHONE (OUTPATIENT)
Dept: UROLOGY | Facility: MEDICAL CENTER | Age: 63
End: 2023-01-23

## 2023-01-23 ENCOUNTER — HOSPITAL ENCOUNTER (EMERGENCY)
Facility: HOSPITAL | Age: 63
Discharge: HOME | End: 2023-01-23
Attending: EMERGENCY MEDICINE

## 2023-01-23 ENCOUNTER — TELEPHONE (OUTPATIENT)
Dept: UROLOGY | Facility: CLINIC | Age: 63
End: 2023-01-23

## 2023-01-23 ENCOUNTER — HOME CARE VISIT (OUTPATIENT)
Dept: HOME HEALTH SERVICES | Facility: HOME HEALTHCARE | Age: 63
End: 2023-01-23

## 2023-01-23 VITALS
HEART RATE: 65 BPM | DIASTOLIC BLOOD PRESSURE: 78 MMHG | OXYGEN SATURATION: 95 % | TEMPERATURE: 96.5 F | SYSTOLIC BLOOD PRESSURE: 174 MMHG | RESPIRATION RATE: 18 BRPM

## 2023-01-23 VITALS
RESPIRATION RATE: 16 BRPM | OXYGEN SATURATION: 98 % | DIASTOLIC BLOOD PRESSURE: 72 MMHG | HEART RATE: 69 BPM | WEIGHT: 246.69 LBS | SYSTOLIC BLOOD PRESSURE: 164 MMHG | BODY MASS INDEX: 37.51 KG/M2 | TEMPERATURE: 97.6 F

## 2023-01-23 DIAGNOSIS — T83.010A SUPRAPUBIC CATHETER DYSFUNCTION, INITIAL ENCOUNTER (HCC): Primary | ICD-10-CM

## 2023-01-23 RX ORDER — LIDOCAINE HYDROCHLORIDE 20 MG/ML
1 JELLY TOPICAL ONCE
Status: COMPLETED | OUTPATIENT
Start: 2023-01-23 | End: 2023-01-23

## 2023-01-23 RX ORDER — LIDOCAINE HYDROCHLORIDE 20 MG/ML
1 JELLY TOPICAL ONCE
Status: DISCONTINUED | OUTPATIENT
Start: 2023-01-23 | End: 2023-01-23

## 2023-01-23 RX ADMIN — LIDOCAINE HYDROCHLORIDE 1 APPLICATION: 20 JELLY TOPICAL at 18:30

## 2023-01-23 NOTE — TELEPHONE ENCOUNTER
Call transferred by [facility] was out to the home today to do routine change for SPTtoday to #96G silicone latex catheter   They were not able to change and had to send patient to [facility] for evaluation of SPT   Was bleeding a good amount  RN was requesting if 24 f was the correct size or if the patient would need a smaller catheter   Calling to confirm orders and asking if catheter size needs to be down sized   Please send new orders to fax [phone]    Patient does have transportation sometimes , did not today that is why she was sent to Ed

## 2023-01-23 NOTE — TELEPHONE ENCOUNTER
I am not sure why she had a 24 Cypriot catheter, possibly due to obstruction or bleeding  We will see what the ER replaces today, but if there is no blood clots I think she can be downsized    We will have Dr Barksdale Query evaluate her at her upcoming appointment February 1

## 2023-01-23 NOTE — TELEPHONE ENCOUNTER
Returned call to patient advised per providers recommendations  Did advised if between now and her next appt with Dr Dung Cifuentes for cysto   If the patient is having an issue with SPT she should contact our office for further direction  SUSAN verbalized understanding

## 2023-01-23 NOTE — ED PROVIDER NOTES
Pt Name: Xiomara Hatch  MRN: [id number]  [place] 1960  Age/Sex: 63 y o  female  Date of evaluation: 1/23/2023  PCP: Dolly Hernandez, [address] [place] [address]    Chief Complaint   Patient presents with   • Urinary Catheter Problem     Patient presents because her home health nurse was unable to insert her suprapubic catheter at home  DARLING Panchal presents to the Emergency Department complaining of suprapubic catheter was unable to be replaced  Patient had a visiting nurse attempt to change it and was not able to get it back in  The site was bleeding  Patient otherwise feels well and isn't in any pain  HPI      Past Medical and Surgical History    Past Medical History:   Diagnosis Date   • Abnormal liver function    • Anemia    • Anxiety    • Arthritis    • CHF (congestive heart failure) ([facility])    • Chronic kidney disease     stage 3   • Chronic narcotic dependence ([facility])    • Chronic pain disorder     lower back, hands , neck and knees   • Coronary artery disease    • Depression    • Diabetes mellitus ([facility])    • Elevated troponin 02/11/2022   • GERD (gastroesophageal reflux disease)     no meds at present   • Heart murmur     murmur   • Hyperlipidemia    • Hypertension    • Neurogenic bladder    • Open toe wound 12/2020    right big toe open calus but is dry at present   • Renal disorder    • Shortness of breath     exertion   • Skin ulcer of hand, limited to breakdown of skin ([facility] [address] ) 02/25/2021   • Sleep apnea     doesn't use cpap   • Suprapubic catheter [facility])        Past Surgical History:   Procedure Laterality Date   • AMPUTATION     • ANGIOPLASTY  2017 5   • BREAST EXCISIONAL BIOPSY Left    • BREAST SURGERY     • CARDIAC CATHETERIZATION     • CARDIAC CATHETERIZATION N/A 7/1/2022    Procedure: Cardiac catheterization;  Surgeon: James Barger MD;  Location: [facility];   Service: Cardiology   • CARDIAC CATHETERIZATION N/A 7/1/2022    Procedure: Cardiac pci;  Surgeon: Jesús Tesfaye Verenice Fontaine MD;  Location: [facility]; Service: Cardiology   • CARPAL TUNNEL RELEASE Bilateral    • CERVICAL FUSION     • HYSTERECTOMY     • IR SUPRAPUBIC CATHETER PLACEMENT  6/15/2021   • IR TUNNELED DIALYSIS CATHETER PLACEMENT  7/15/2022   • IR TUNNELED DIALYSIS CATHETER PLACEMENT  2022   • KNEE SURGERY     • OOPHORECTOMY     • TX EXC B9 LESION MRGN XCP SK TG S/N/H/F/G 3 1-4 0CM N/A 2020    Procedure: EXCISION SEBACEOUS CYST X 5 SCALP;  Surgeon: Holden Ag MD;  Location:  MAIN OR;  Service: General   • TOE AMPUTATION Left    • TRIGGER FINGER RELEASE Right     4th Finger       Family History   Problem Relation Age of Onset   • Stroke Father    • Heart disease Father    • No Known Problems Mother    • No Known Problems Sister    • No Known Problems Daughter    • No Known Problems Maternal Grandmother    • No Known Problems Maternal Grandfather    • No Known Problems Paternal Grandmother    • No Known Problems Paternal Grandfather    • No Known Problems Maternal Aunt    • No Known Problems Maternal Aunt    • No Known Problems Maternal Aunt    • No Known Problems Maternal Aunt    • No Known Problems Maternal Aunt    • No Known Problems Maternal Aunt    • No Known Problems Paternal Aunt        Social History     Tobacco Use   • Smoking status: Former     Packs/day: 1 00     Years: 35 00     Pack years: 35 00     Types: Cigarettes     Quit date:      Years since quittin    • Smokeless tobacco: Never   Vaping Use   • Vaping Use: Never used   Substance Use Topics   • Alcohol use: Not Currently   • Drug use: Never           Allergies    Allergies   Allergen Reactions   • Codeine      Other reaction(s): Nausea and Vomiting   • Latex Itching       Home Medications    Prior to Admission medications    Medication Sig Start Date End Date Taking?  Authorizing Provider   allopurinol (ZYLOPRIM) 300 mg tablet Take 1 tablet (300 mg total) by mouth daily 22   CHERELLE Francis aspirin (ECOTRIN LOW STRENGTH) 81 mg EC tablet Take 1 tablet (81 mg total) by mouth daily 11/30/22   CHERELLE Nguyen   atorvastatin (LIPITOR) 40 mg tablet Take 1 tablet (40 mg total) by mouth daily 11/30/22   CHERELLE Nguyen   Blood Glucose Monitoring Suppl (OneTouch Verio) w/Device KIT Use in the morning 1/17/23   CHERELLE Nguyen   calcitriol (ROCALTROL) 0 25 mcg capsule Take 1 capsule (0 25 mcg total) by mouth 3 (three) times a week 1/5/23   Dianah Bumpers, MD   carvedilol (COREG) 25 mg tablet Take 1 tablet (25 mg total) by mouth 2 (two) times a day with meals 11/30/22   CHERELLE Nguyen   citalopram (CeleXA) 40 mg tablet Take 1 tablet (40 mg total) by mouth daily 11/30/22   CHERELLE Nguyen   clopidogrel (PLAVIX) 75 mg tablet Take 1 tablet (75 mg total) by mouth daily 11/30/22   CHERELLE Nguyen   Cyanocobalamin (Vitamin Deficiency System-B12) 1000 MCG/ML KIT Inject 1 mL (1,000 mcg total) into a muscle every 30 (thirty) days 1/5/23   CHERELLE Nguyen   docusate sodium (COLACE) 50 mg capsule Take 1 capsule (50 mg total) by mouth 2 (two) times a day 11/30/22   CHERELLE Nguyen   doxycycline (ADOXA) 100 MG tablet Take 1 tablet (100 mg total) by mouth 2 (two) times a day for 5 days 1/19/23 1/24/23  CHERELLE Bowling   Dulaglutide (Trulicity) 1 5 UP/5 3LY SOPN Inject 0 5 mL (1 5 mg total) under the skin once a week 11/30/22   CHERELLE Nguyen   glucose blood (OneTouch Verio) test strip Use as instructed 1/17/23   CHERELLE Nguyen   hydrALAZINE (APRESOLINE) 25 mg tablet Take 1 tablet (25 mg total) by mouth 3 (three) times a day Hold SBP <100 1/19/23   CHERELLE Nguyen   Incontinence Supplies (Urinary Drainage Bag) MISC Attach one bag to suprapubic catheter as needed 10/31/22   Historical Provider, MD   Incontinence Supplies (Urinary Leg Bag) KIT Attach one bag to suprapubic catheter morales as needed 10/31/22   Historical Provider, MD   Insulin Glargine Solostar (Lantus SoloStar) 100 UNIT/ML SOPN Inject 0 25 mL (25 Units total) under the skin every 12 (twelve) hours 11/29/22   Chikis Rival, CRNP   insulin lispro (HumaLOG) 100 units/mL injection pen INJECT 20 UNITS UNDER THE SKIN 3 (THREE) TIMES A DAY WITH MEALS 1/3/23   Chikis Rival, CRNP   Insulin Pen Needle (BD Pen Needle Micro U/F) 32G X 6 MM MISC Use 5 (five) times a day 11/30/22   Chikis Rival, CRNP   Insulin Syringe-Needle U-100 (BD Veo Insulin Syringe U/F) 31G X 15/64" 0 5 ML MISC Inject under the skin 3 (three) times a day 3/29/22   Chikis Rival, CRNP   isosorbide mononitrate (IMDUR) 30 mg 24 hr tablet Take 1 tablet (30 mg total) by mouth every evening 11/30/22   Chikis Rival, CRNP   ketoconazole (NIZORAL) 2 % cream Apply to suprapubic catheter site twice daily  10/6/22   Historical Provider, MD   Lancets (OneTouch Delica Plus [id number]) MISC USE TO TEST 3 TIMES DAILY 11/30/22   Chikis Rival, CRNP   levothyroxine 50 mcg tablet Take 1 tablet (50 mcg total) by mouth daily 11/30/22   Chikis Rival, CRNP   losartan (COZAAR) 25 mg tablet TAKE 1 TABLET (25 MG TOTAL) BY MOUTH DAILY  12/27/22   CHERELLE Johns   naloxone (NARCAN) 4 mg/0 1 mL nasal spray Administer 1 spray into a nostril  If breathing does not return to normal or if breathing difficulty resumes after 2-3 minutes, give another dose in the other nostril using a new spray   9/1/22   Chikis Rival, CRNP   nitroglycerin (NITROSTAT) 0 4 mg SL tablet Place 1 tablet (0 4 mg total) under the tongue every 5 (five) minutes as needed for chest pain 11/30/22   Chikis Rival, CRNP   nystatin (MYCOSTATIN) powder Apply topically 2 (two) times a day 11/30/22   Chikis Rival, CRNP   OLANZapine (ZyPREXA) 5 mg tablet TAKE 1 TABLET BY MOUTH AT BEDTIME 11/28/22   CHERELLE Muñiz   ondansetron [facility]) 4 mg tablet Take 1 tablet (4 mg total) by mouth every 8 (eight) hours as needed for nausea or vomiting 1/17/23   Rubi Webb MD   OneTouch Delica Lancets 32U MISC Use 3 (three) times a day 1/17/23   CHERELLE Rasheed   OneTouch Ultra test strip USE 1 EACH DAILY USE AS INSTRUCTED 2/21/22   CHERELLE Rasheed   oxyCODONE (ROXICODONE) 5 immediate release tablet Take 1 tablet (5 mg total) by mouth every 8 (eight) hours as needed for severe pain Max Daily Amount: 15 mg 1/5/23   CHERELLE Rasheed   pantoprazole (PROTONIX) 40 mg tablet Take 1 tablet (40 mg total) by mouth daily 11/30/22   CHERELLE Rasheed   silver sulfadiazine (SILVADENE,SSD) 1 % cream Apply topically daily 1/5/23   CHERELLE Rasheed   tiZANidine (ZANAFLEX) 4 mg tablet Take 1 tablet (4 mg total) by mouth every 8 (eight) hours as needed for muscle spasms 11/30/22   CHERELLE Rasheed   Torsemide 40 MG TABS Take 40 mg by mouth daily 1/18/23   Sana Golden Holstein, MD   oxygen gas Inhale 2 L/min continuous  Indications: Respiratory Failure 1/1/20 6/11/22  Historical Provider, MD           Review of Systems    Review of Systems   Constitutional: Negative for chills and fever  HENT: Negative for ear pain and sore throat  Eyes: Negative for pain and visual disturbance  Respiratory: Negative for cough and shortness of breath  Cardiovascular: Negative for chest pain and palpitations  Gastrointestinal: Negative for abdominal pain and vomiting  Genitourinary: Positive for difficulty urinating  Negative for dysuria and hematuria  Musculoskeletal: Negative for arthralgias and back pain  Skin: Negative for color change and rash  Neurological: Negative for seizures and syncope  All other systems reviewed and are negative  All other systems reviewed and negative      Physical Exam      ED Triage Vitals   Temperature Pulse Respirations Blood Pressure SpO2   01/23/23 1329 01/23/23 1327 01/23/23 1327 01/23/23 1327 01/23/23 1327   97 6 °F (36 4 °C) 69 16 164/72 98 %      Temp Source Heart Rate Source Patient Position - Orthostatic VS BP Location FiO2 (%)   01/23/23 1329 01/23/23 1327 01/23/23 1327 01/23/23 1327 --   Oral Monitor Sitting Right arm       Pain Score       --                      Physical Exam  Vitals and nursing note reviewed  Constitutional:       General: She is not in acute distress  Appearance: She is well-developed  She is obese  HENT:      Head: Normocephalic and atraumatic  Eyes:      Conjunctiva/sclera: Conjunctivae normal    Cardiovascular:      Rate and Rhythm: Normal rate and regular rhythm  Heart sounds: No murmur heard  Pulmonary:      Effort: Pulmonary effort is normal  No respiratory distress  Breath sounds: Normal breath sounds  Abdominal:      Palpations: Abdomen is soft  Tenderness: There is no abdominal tenderness  Musculoskeletal:         General: No swelling  Cervical back: Neck supple  Skin:     General: Skin is warm and dry  Capillary Refill: Capillary refill takes less than 2 seconds  Neurological:      Mental Status: She is alert  Psychiatric:         Mood and Affect: Mood normal                 Assessment and Plan    Eusebio Alba is a 63 y o  female who presents with need for suprapubic catheter replacement  Physical examination remarkable for same  Differential diagnosis (not completely inclusive) includes neurogenic bladder  Plan will be to perform diagnostic testing and treat symptomatically        MDM    Diagnostic Results      Labs:    Results for orders placed or performed in visit on 01/18/23   Semi Electric Bed Package   Result Value Ref Range    Supplier Name [facility]/[address]     Supplier Phone Number [phone]     Order Status Delivery Successful     Delivery Note      Delivery Request Date 01/18/2023     Date Delivered  01/19/2023     Supplier Name 01/19/2023     Item Description Semi-Electric Hospital Bed, Gel Overlay     Item Description Standard Mattress     Item Description Gel Overlay     Item Description Half Bed Rails        All labs reviewed and utilized in the medical decision making process    Radiology:    No orders to display       All radiology studies independently viewed by me and interpreted by the radiologist     Procedure    Procedures      ED Course of Care and Re-Assessments    I attempted twice to replace the catheter using 24Fr and 22Fr but it did not advance easily, met with some resistance and patient is already bleeding from the site  I spoke with urology who agreed with plan for outpatient replacement  I placed an order for IR who will hopefully contact her  She already has a cystoscopy scheduled for 2/1 with Dr Watson Area  Cam[place] placed by nursing and patient to be discharged  Medications   lidocaine (URO-JET) 2 % urethral/mucosal gel 1 application (1 application Urethral Given by Other 1/23/23 0930)           FINAL IMPRESSION    Final diagnoses:   Suprapubic catheter dysfunction, initial encounter [facility])         DISPOSITION/PLAN    Time reflects when diagnosis was documented in both MDM as applicable and the Disposition within this note     Time User Action Codes Description Comment    1/23/2023  7:11 PM Kandace Coleman Add [T83 [id number]] Suprapubic catheter dysfunction, initial encounter [facility])       ED Disposition     ED Disposition   Discharge    Condition   Stable    Date/Time   Mon Jan 23, 2023  7:11 PM    Comment   Jay Jay Saha discharge to home/self care                 Follow-up Information     Follow up With Specialties Details Why Contact Info Additional Information    Emmy Grady, [address], Nurse Practitioner Schedule an appointment as soon as possible for a visit   [address] [facility] [address]        [facility] [place] [facility] Schedule an appointment as soon as possible for a visit   [facility]  [address] [facility] [address]  0  [facility] [place], [facility] [address], [place], [place], [id number] [phone]            PATIENT REFERRED TO:    Harman Mendez, [address]  [address] [facility]  [address]  [facility] [address]     Schedule an appointment as soon as possible for a visit       [facility] [address] [address] [phone]  Schedule an appointment as soon as possible for a visit         DISCHARGE MEDICATIONS:    Discharge Medication List as of 1/23/2023  7:13 PM      CONTINUE these medications which have NOT CHANGED    Details   allopurinol (ZYLOPRIM) 300 mg tablet Take 1 tablet (300 mg total) by mouth daily, Starting Wed 11/30/2022, Normal      aspirin (ECOTRIN LOW STRENGTH) 81 mg EC tablet Take 1 tablet (81 mg total) by mouth daily, Starting Wed 11/30/2022, Normal      atorvastatin (LIPITOR) 40 mg tablet Take 1 tablet (40 mg total) by mouth daily, Starting Wed 11/30/2022, Normal      Blood Glucose Monitoring Suppl (OneTouch Verio) w/Device KIT Use in the morning, Starting Tue 1/17/2023, Normal      calcitriol (ROCALTROL) 0 25 mcg capsule Take 1 capsule (0 25 mcg total) by mouth 3 (three) times a week, Starting Thu 1/5/2023, No Print      carvedilol (COREG) 25 mg tablet Take 1 tablet (25 mg total) by mouth 2 (two) times a day with meals, Starting Wed 11/30/2022, Normal      citalopram (CeleXA) 40 mg tablet Take 1 tablet (40 mg total) by mouth daily, Starting Wed 11/30/2022, Normal      clopidogrel (PLAVIX) 75 mg tablet Take 1 tablet (75 mg total) by mouth daily, Starting Wed 11/30/2022, Normal      Cyanocobalamin (Vitamin Deficiency System-B12) 1000 MCG/ML KIT Inject 1 mL (1,000 mcg total) into a muscle every 30 (thirty) days, Starting Thu 1/5/2023, Normal      docusate sodium (COLACE) 50 mg capsule Take 1 capsule (50 mg total) by mouth 2 (two) times a day, Starting Wed 11/30/2022, Normal      doxycycline (ADOXA) 100 MG tablet Take 1 tablet (100 mg total) by mouth 2 (two) times a day for 5 days, Starting Thu 1/19/2023, Until Tue 1/24/2023, Normal      Dulaglutide (Trulicity) 1 5 PP/9 4GI SOPN Inject 0 5 mL (1 5 mg total) under the skin once a week, Starting Wed 11/30/2022, Normal      !! glucose blood (OneTouch Verio) test strip Use as instructed, Normal      hydrALAZINE (APRESOLINE) 25 mg tablet Take 1 tablet (25 mg total) by mouth 3 (three) times a day Hold SBP <100, Starting Thu 1/19/2023, Normal      Incontinence Supplies (Urinary Drainage Bag) MISC Attach one bag to suprapubic catheter as needed, Historical Med      Incontinence Supplies (Urinary Leg Bag) KIT Attach one bag to suprapubic catheter morales as needed, Historical Med      Insulin Glargine Solostar (Lantus SoloStar) 100 UNIT/ML SOPN Inject 0 25 mL (25 Units total) under the skin every 12 (twelve) hours, Starting Tue 11/29/2022, Normal      insulin lispro (HumaLOG) 100 units/mL injection pen INJECT 20 UNITS UNDER THE SKIN 3 (THREE) TIMES A DAY WITH MEALS, Normal      Insulin Pen Needle (BD Pen Needle Micro U/F) 32G X 6 MM MISC Use 5 (five) times a day, Starting Wed 11/30/2022, Normal      Insulin Syringe-Needle U-100 (BD Veo Insulin Syringe U/F) 31G X 15/64" 0 5 ML MISC Inject under the skin 3 (three) times a day, Starting Tue 3/29/2022, Normal      isosorbide mononitrate (IMDUR) 30 mg 24 hr tablet Take 1 tablet (30 mg total) by mouth every evening, Starting Wed 11/30/2022, Normal      ketoconazole (NIZORAL) 2 % cream Apply to suprapubic catheter site twice daily  , Historical Med      !! Lancets (OneTouch Delica Plus [id number]) MISC USE TO TEST 3 TIMES DAILY, Normal      levothyroxine 50 mcg tablet Take 1 tablet (50 mcg total) by mouth daily, Starting Wed 11/30/2022, Normal      losartan (COZAAR) 25 mg tablet TAKE 1 TABLET (25 MG TOTAL) BY MOUTH DAILY  , Starting Tue 12/27/2022, Normal      naloxone (NARCAN) 4 mg/0 1 mL nasal spray Administer 1 spray into a nostril   If breathing does not return to normal or if breathing difficulty resumes after 2-3 minutes, give another dose in the other nostril using a new spray , Normal      nitroglycerin (NITROSTAT) 0 4 mg SL tablet Place 1 tablet (0 4 mg total) under the tongue every 5 (five) minutes as needed for chest pain, Starting Wed 11/30/2022, Normal      nystatin (MYCOSTATIN) powder Apply topically 2 (two) times a day, Starting Wed 11/30/2022, Normal      OLANZapine (ZyPREXA) 5 mg tablet TAKE 1 TABLET BY MOUTH AT BEDTIME, Normal      ondansetron (ZOFRAN) 4 mg tablet Take 1 tablet (4 mg total) by mouth every 8 (eight) hours as needed for nausea or vomiting, Starting Tue 1/17/2023, Normal      !! OneTouch Delica Lancets 86S MISC Use 3 (three) times a day, Starting Tue 1/17/2023, Normal      !! OneTouch Ultra test strip USE 1 EACH DAILY USE AS INSTRUCTED, Starting Mon 2/21/2022, Normal      oxyCODONE (ROXICODONE) 5 immediate release tablet Take 1 tablet (5 mg total) by mouth every 8 (eight) hours as needed for severe pain Max Daily Amount: 15 mg, Starting Thu 1/5/2023, Normal      pantoprazole (PROTONIX) 40 mg tablet Take 1 tablet (40 mg total) by mouth daily, Starting Wed 11/30/2022, Normal      silver sulfadiazine (SILVADENE,SSD) 1 % cream Apply topically daily, Starting Thu 1/5/2023, Normal      tiZANidine (ZANAFLEX) 4 mg tablet Take 1 tablet (4 mg total) by mouth every 8 (eight) hours as needed for muscle spasms, Starting Wed 11/30/2022, Normal      Torsemide 40 MG TABS Take 40 mg by mouth daily, Starting Wed 1/18/2023, Normal       !! - Potential duplicate medications found  Please discuss with provider                     Reese Colon, [address] [place] [address], DO  01/26/23 8619

## 2023-01-24 ENCOUNTER — PATIENT OUTREACH (OUTPATIENT)
Dept: FAMILY MEDICINE CLINIC | Facility: CLINIC | Age: 63
End: 2023-01-24

## 2023-01-24 DIAGNOSIS — R33.9 URINARY RETENTION: Primary | ICD-10-CM

## 2023-01-24 RX ORDER — SODIUM CHLORIDE 9 MG/ML
75 INJECTION, SOLUTION INTRAVENOUS CONTINUOUS
OUTPATIENT
Start: 2023-01-24

## 2023-01-24 RX ORDER — CEFAZOLIN SODIUM 2 G/50ML
2000 SOLUTION INTRAVENOUS ONCE
OUTPATIENT
Start: 2023-02-06 | End: 2023-01-24

## 2023-01-24 NOTE — TELEPHONE ENCOUNTER
[facility] Work form 1-1 at [phone]  FAX CONFIRMATION RECEIVED        Form scanned into pt chart

## 2023-01-24 NOTE — PROGRESS NOTES
Outgoing Calls:  1/24/2023    CMOC did call pt to discuss status of waiver services/application  Pt stated that she has not heard back from [employer]  [facility]OC did facilitate a three way call to [employer] as they are supposed to assess pt for waiver services  Message left with answering service  CMOC did then call [employer] with pt and were informed they do have pt's Physician Certification form and are submitting request for [employer] to call pt and assess  [facility] informed pt she should call back by end of week if [employer] has not reached out to her  Pt agreed  CMOC to f/u  Next outreach is scheduled for 1/31/2023

## 2023-01-24 NOTE — TELEPHONE ENCOUNTER
Patient in [facility] ED with displaced SPT - [facility] and ED unable to replace SPT  They will insert a morales  Will need outpatient coordination of IR SPT replacement  Looks like she has a visit 2/1 with Dr Ej Brown for cystoscopy

## 2023-01-24 NOTE — TELEPHONE ENCOUNTER
Patient has an appt with Dr Waddell for cysto on 2/1/23 we can keep this appt and he can set her back up for IR SPT replacement  Just wanted to give you a heads up

## 2023-01-25 ENCOUNTER — PATIENT OUTREACH (OUTPATIENT)
Dept: FAMILY MEDICINE CLINIC | Facility: CLINIC | Age: 63
End: 2023-01-25

## 2023-01-25 ENCOUNTER — HOME CARE VISIT (OUTPATIENT)
Dept: HOME HEALTH SERVICES | Facility: HOME HEALTHCARE | Age: 63
End: 2023-01-25

## 2023-01-25 NOTE — PROGRESS NOTES
I called the patient to follow up after a recent ED visit for displaced SPC  She stated [facility] was unable to change the catheter and was referred to the ED who could not reinsert either  The patient currently has a [place] in place which she does not like as it is very uncomfortable  The patient is scheduled for IR catheter placement 2/3  The patient states she has not been checking her blood sugars  I strongly suggested she start checking so we can keep her daily sugars under control and her A1C level normal     I asked the patient if her blood pressure is being checked to see if she has been taking hydralazine  Daughter Ashly Grant was also on the call at this point and checked the patient's blood pressure, 183/83  I informed them with this reading the patient will need hydralazine  The medication could not be found; I am not confident that the patient's blood pressure has been checked on a regular basis since my call on 1/19 nor the patient been given the hydralazine  I urged them to look for the medication and call the pharmacy to confirm it was picked up  I ended the call so the medication could be located  I reminded the patient of her Cards appointment on Friday at 1000  I will continue to follow

## 2023-01-26 DIAGNOSIS — N18.6 ESRD (END STAGE RENAL DISEASE) ON DIALYSIS (HCC): ICD-10-CM

## 2023-01-26 DIAGNOSIS — Z99.2 ESRD (END STAGE RENAL DISEASE) ON DIALYSIS (HCC): ICD-10-CM

## 2023-01-26 DIAGNOSIS — N25.81 SECONDARY HYPERPARATHYROIDISM OF RENAL ORIGIN (HCC): ICD-10-CM

## 2023-01-26 RX ORDER — CALCITRIOL 0.5 UG/1
0.5 CAPSULE, LIQUID FILLED ORAL 3 TIMES WEEKLY
Qty: 45 CAPSULE | Refills: 5
Start: 2023-01-27

## 2023-01-27 ENCOUNTER — PREP FOR PROCEDURE (OUTPATIENT)
Dept: VASCULAR SURGERY | Facility: CLINIC | Age: 63
End: 2023-01-27

## 2023-01-27 ENCOUNTER — HOME CARE VISIT (OUTPATIENT)
Dept: HOME HEALTH SERVICES | Facility: HOME HEALTHCARE | Age: 63
End: 2023-01-27

## 2023-01-27 ENCOUNTER — TELEPHONE (OUTPATIENT)
Dept: RADIOLOGY | Facility: HOSPITAL | Age: 63
End: 2023-01-27

## 2023-01-27 ENCOUNTER — OFFICE VISIT (OUTPATIENT)
Dept: CARDIOLOGY CLINIC | Facility: CLINIC | Age: 63
End: 2023-01-27

## 2023-01-27 VITALS
HEART RATE: 60 BPM | BODY MASS INDEX: 37.04 KG/M2 | DIASTOLIC BLOOD PRESSURE: 45 MMHG | OXYGEN SATURATION: 96 % | SYSTOLIC BLOOD PRESSURE: 91 MMHG | HEIGHT: 68 IN | WEIGHT: 244.4 LBS

## 2023-01-27 VITALS — DIASTOLIC BLOOD PRESSURE: 52 MMHG | HEART RATE: 66 BPM | SYSTOLIC BLOOD PRESSURE: 136 MMHG | OXYGEN SATURATION: 96 %

## 2023-01-27 DIAGNOSIS — I10 PRIMARY HYPERTENSION: ICD-10-CM

## 2023-01-27 DIAGNOSIS — N18.6 TYPE 2 DIABETES MELLITUS WITH CHRONIC KIDNEY DISEASE ON CHRONIC DIALYSIS, WITH LONG-TERM CURRENT USE OF INSULIN (HCC): ICD-10-CM

## 2023-01-27 DIAGNOSIS — I50.42 CHRONIC COMBINED SYSTOLIC AND DIASTOLIC CONGESTIVE HEART FAILURE (HCC): Primary | ICD-10-CM

## 2023-01-27 DIAGNOSIS — I20.8 STABLE ANGINA (HCC): ICD-10-CM

## 2023-01-27 DIAGNOSIS — I25.10 CORONARY ARTERY DISEASE INVOLVING NATIVE CORONARY ARTERY OF NATIVE HEART WITHOUT ANGINA PECTORIS: ICD-10-CM

## 2023-01-27 DIAGNOSIS — Z99.2 TYPE 2 DIABETES MELLITUS WITH CHRONIC KIDNEY DISEASE ON CHRONIC DIALYSIS, WITH LONG-TERM CURRENT USE OF INSULIN (HCC): ICD-10-CM

## 2023-01-27 DIAGNOSIS — E27.8 ADRENAL NODULE (HCC): ICD-10-CM

## 2023-01-27 DIAGNOSIS — I47.29 NSVT (NONSUSTAINED VENTRICULAR TACHYCARDIA): ICD-10-CM

## 2023-01-27 DIAGNOSIS — N18.6 ESRD (END STAGE RENAL DISEASE) (HCC): ICD-10-CM

## 2023-01-27 DIAGNOSIS — E11.22 TYPE 2 DIABETES MELLITUS WITH CHRONIC KIDNEY DISEASE ON CHRONIC DIALYSIS, WITH LONG-TERM CURRENT USE OF INSULIN (HCC): ICD-10-CM

## 2023-01-27 DIAGNOSIS — Z99.2 DEPENDENCE ON RENAL DIALYSIS (HCC): ICD-10-CM

## 2023-01-27 DIAGNOSIS — Z79.4 TYPE 2 DIABETES MELLITUS WITH CHRONIC KIDNEY DISEASE ON CHRONIC DIALYSIS, WITH LONG-TERM CURRENT USE OF INSULIN (HCC): ICD-10-CM

## 2023-01-27 DIAGNOSIS — Z93.59 SUPRAPUBIC CATHETER (HCC): ICD-10-CM

## 2023-01-27 NOTE — PROGRESS NOTES
Cardiology   MD Paris Delgado MD Duke Leek, DO, Gareld Oto, FACP Ather Mansoor, MD Dianah Dandy, DO, Cecile Zambrano DO, [facility]  -------------------------------------------------------------------  [facility]  [address]  [place], [place] [address]  [phone]  [phone]  01/27/23  Hoang Krause  YOB: 1960   MRN: [id number]      Referring Physician: Brant De Jesus, [facility] [address]     HPI: Hoang Krause is a 63 y o  female with:   Chronic combined systolic and diastolic heart failure   LVEF 38%, moderate LVH, mild LA dilated, AV sclerosis, trace AR, mild MR, MV sclerosis, mild TR, June 2022   CAD with multiple PCIs   NSTEMI s/p JANET-mLCx w/ small 95% pRCA and diffuse moderate disease of LAD, July 2022   s/p -pRCA, August 2021   s/p JANET-mLAD, JANET-D1 and JANET-LCx, December 2012   Recent Acute renal failure on chronic kidney disease   Recent acute enteritis   Ischemic cardiomyopathy with moderate to markedly reduced LV systolic function   Dyslipidemia   Morbid obesity   CKD stage 4   Anemia of chronic kidney disease   Type 2 diabetes mellitus   PCOS   Fibromyalgia   Neurogenic bladder with suprapubic catheter in place   Obstructive sleep apnea, uncorrected   Syncope with orthostatic hypotension   Mild pulmonary hypertension     She presents today for cardiology follow-up as well as preoperative risk assessment prior to AV fistula creation left upper extremity with vascular  She states she denies any chest pain, has been having issues with her blood pressure where it has been running on the higher side but today in the office, checked twice, blood pressure is at the lower end of normal     Moved to [place] in August but then moved back here to [place] in October/November    Review of Systems   Constitutional: Negative for chills and fever     HENT: Negative for facial swelling and sore throat  Eyes: Negative for visual disturbance  Respiratory: Negative for cough, chest tightness, shortness of breath and wheezing  Cardiovascular: Negative for chest pain, palpitations and leg swelling  Gastrointestinal: Negative for abdominal pain, blood in stool, constipation, diarrhea, nausea and vomiting  Endocrine: Negative for cold intolerance and heat intolerance  Genitourinary: Negative for decreased urine volume, difficulty urinating, dysuria and hematuria  Musculoskeletal: Negative for arthralgias, back pain and myalgias  Skin: Negative for rash  Neurological: Negative for dizziness, syncope, weakness and numbness  Psychiatric/Behavioral: Negative for agitation, behavioral problems and confusion  The patient is not nervous/anxious  OBJECTIVE  Vitals:    01/27/23 1021   BP: (!) 91/45   Pulse: 60   SpO2:        Physical Exam  Constitutional: awake, alert and oriented, in no acute distress, no obvious deformities, obese female  Head: Normocephalic, without obvious abnormality, atraumatic  Eyes: conjunctivae clear and moist  Sclera anicteric  No xanthelasmas  Pupils equal bilaterally  Extraocular motions are full  Ear nose mouth and throat: ears are symmetrical bilaterally, hearing appears to be equal bilaterally, no nasal discharge or epistaxis, oropharynx is clear with moist mucous membranes  Neck: Trachea is midline, neck is supple, no thyromegaly or significant lymphadenopathy, there is full range of motion    Lungs: clear to auscultation bilaterally, no wheezes, no rales, no rhonchi, no accessory muscle use, breathing is nonlabored  Heart: regular rate and rhythm, S1, S2 normal, 2/6 systolic murmur at the left sternal border, no click, no rub and no gallop, no lower extremity edema    Abdomen: Obese, soft, non-tender; bowel sounds normal; no masses, no organomegaly  Psychiatric: Patient is oriented to time, place, person, mood/affect is negative for depression, anxiety, agitation, appears to have appropriate insight  Skin: sHe has a right upper chest tunneled dialysis catheter      EKG:  No results found for this visit on 01/27/23  IMPRESSION:  Chronic combined systolic and diastolic heart failure   LVEF 38%, moderate LVH, mild LA dilated, AV sclerosis, trace AR, mild MR, MV sclerosis, mild TR, June 2022   CAD with multiple PCIs   NSTEMI s/p JANET-mLCx w/ small 95% pRCA and diffuse moderate disease of LAD, July 2022   s/p -pRCA, August 2021   s/p JANET-mLAD, JANET-D1 and JANET-LCx, December 2012   Recent Acute renal failure on chronic kidney disease   Recent acute enteritis   Ischemic cardiomyopathy with moderate to markedly reduced LV systolic function   Dyslipidemia   Morbid obesity   CKD stage 4   Anemia of chronic kidney disease   Type 2 diabetes mellitus   PCOS   Fibromyalgia   Neurogenic bladder with suprapubic catheter in place   Obstructive sleep apnea, uncorrected   Syncope with orthostatic hypotension   Mild pulmonary hypertension   Preoperative cardiac risk assessment    DISCUSSION/RECOMMENDATIONS:  Presents today for follow-up  She is having no symptoms of angina at this time  She has a mild to moderately reduced ejection fraction on echo, underwent PCI in July 2022 for her coronary disease with PCI to the left circumflex artery  She does have a chronically total occlusion of the RCA and has stents in the mid LAD as well as the first diagonal artery as well  She has hypertension dyslipidemia end-stage renal disease on dialysis and is a type II diabetic  Given this her cardiac risk is definitely elevated for any type of surgery however for AV fistula creation, she is not with a prohibitive risk such so that she could not undergo surgery  Her blood pressure is at the lower end of normal today but has been running high on occasions  Would defer management of this to her nephrologist given her advanced kidney disease    Will be okay to proceed with AV fistula creation from cardiac standpoint without further testing at this time as she is remained asymptomatic since PCI in July  For her cardiac disease, would continue with aspirin 81, Plavix 75 mg daily carvedilol 25 twice a day, Imdur 30 mg daily, losartan 25 mg daily, and torsemide to help with volume control in addition to the hemodialysis  Last lipid panel shows total cholesterol 120 triglycerides 138 HDL 42 LDL 50  We will continue with current dose of atorvastatin for this    Honora DO Doreen, [address] [facility]  --------------------------------------------------------------------------------  TREADMILL STRESS  No results found for this or any previous visit      ----------------------------------------------------------------------------------------------  NUCLEAR STRESS TEST: No results found for this or any previous visit  Results for orders placed during the hospital encounter of 06/21/22    NM myocardial perfusion spect (rx stress and/or rest)    Interpretation Summary  1  Nondiagnostic ECG portion of stress test due to presence of left bundle-branch block abnormality  No angina and no new ST T-wave abnormalities were noted during the stress test   2  Abnormal myocardial perfusion scan  There were multiple perfusion abnormalities involving the inferolateral and lateral wall and anterior and anteroseptal walls  Cannot exclude ischemia involving multiple vessels (triple-vessel and left main disease)  Imaging portion of the stress test is affected by significant soft tissue and diaphragmatic attenuation artifact  3  Dilated left ventricular cavity with moderate to markedly reduced left ventricular systolic function and regional wall motion abnormalities  Post-stress ejection fraction was calculated as 32% although visually it was around 40%  4  Elevated resting blood pressure with appropriate blood pressure response noted during stress test   5  Abnormal baseline ECG with left bundle-branch block  No significant changes and no significant arrhythmia noted during stress test     There is no prior study for comparison  Clinical correlation is recommended  --------------------------------------------------------------------------------  CATH:  Results for orders placed during the hospital encounter of 21    Cardiac catheterization    Narrative  [address] [place] [address], [address]  [phone]    [facility]    Invasive Cardiovascular Lab Complete Report    Patient: Breann Nicole  MR number: [id number]  Account number: [de-identified]  Study date: 2021  Gender: Female  : 1960  Height: 68 1 in  Weight: 279 4 lb  BSA: 2 36 mï¾²    Allergies: CODEINE, LATEX    Diagnostic Cardiologist:  Nicole López MD  Primary Physician:  Martha BAUTISTA    CORONARY CIRCULATION:  Proximal RCA: There was a 100 % stenosis  This lesion is a chronic total occlusion  INDICATIONS:  --  Possible CAD: myocardial infarction without ST elevation (NSTEMI)  --  Congestive heart failure with ischemic cardiomyopathy  PROCEDURES PERFORMED    --  Left heart catheterization without ventriculogram   --  Left coronary angiography  --  Right coronary angiography  --  Inpatient  --  Mod Sedation Same Physician Initial 15min  --  Coronary Catheterization (w/ LHC)  PROCEDURE: The risks and alternatives of the procedures and conscious sedation were explained to the patient and informed consent was obtained  The patient was brought to the cath lab and placed on the table  The planned puncture sites  were prepped and draped in the usual sterile fashion  --  Right radial artery access  After performing an Erasto's test to verify adequate ulnar artery supply to the hand, the radial site was prepped  The puncture site was infiltrated with local anesthetic   The vessel was accessed using the  modified Seldinger technique, a wire was advanced into the vessel, and a sheath was advanced over the wire into the vessel  --  Left heart catheterization without ventriculogram  A catheter was advanced over a guidewire into the ascending aorta  After recording ascending aortic pressure, the catheter was advanced across the aortic valve and left ventricular  pressure was recorded  The catheter was pulled back across the aortic valve and into the ascending aorta and pullback pressures were obtained  --  Left coronary artery angiography  A catheter was advanced over a guidewire into the aorta and positioned in the left coronary artery ostium under fluoroscopic guidance  Angiography was performed  --  Right coronary artery angiography  A catheter was advanced over a guidewire into the aorta and positioned in the right coronary artery ostium under fluoroscopic guidance  Angiography was performed  --  Inpatient  --  Mod Sedation Same Physician Initial 15min  --  Coronary Catheterization (w/ LH)  PROCEDURE COMPLETION: The patient tolerated the procedure well and was discharged from the cath lab  TIMING: Test started at 14:33  Test concluded at 14:45  HEMOSTASIS: The sheath was removed  The site was compressed with a Hemoband  device  Hemostasis was obtained  MEDICATIONS GIVEN: Fentanyl (1OOmcg/2 ml), 50 mcg, IV, at 14:30  Versed (2mg/2ml), 2 mg, IV, at 14:30  Zofran (Ondansetron), 4 mg, IV, at 14:34  1% Lidocaine, 2 ml, subcutaneously, at 14:35  Nitroglycerin  (200mcg/ml), 200 mcg, at 14:37  Verapamil (5mg/2ml), 5 mg, IV, at 14:37  Heparin 1000 units/ml, 4,000 units, IV, at 14:37  CONTRAST GIVEN: 30 ml Visipaque (320mg I /ml)  RADIATION EXPOSURE: Fluoroscopy time: 1 32 min  HEMODYNAMICS: Hemodynamic assessment demonstrated normal LVEDP  12-14    CORONARY VESSELS:   --  The coronary circulation is left dominant  --  Left main: The vessel was medium to large sized  Angiography showed mild atherosclerosis  --  LAD: The vessel was large sized   Angiography showed no evidence of disease  --  Proximal LAD: There was a discrete 30 % stenosis at the site of a prior stent  --  Mid LAD: There was a 0 % stenosis at the site of a prior stent  --  Distal LAD: There was a 0 % stenosis at the site of a prior stent  --  Proximal circumflex: There was a 0 % stenosis at the site of a prior stent  --  RCA: The vessel was small (non-dominant)  --  Proximal RCA: There was a 100 % stenosis  This lesion is a chronic total occlusion  IMPRESSIONS:  Type 2 MI  There is significant single vessel coronary artery disease  RECOMMENDATIONS  The patient should continue with the present medications  DISPOSITION:  The patient left the catheterization laboratory in stable condition  Prepared and signed by    Tiana Clark MD  Signed 2021 14:55:54    Study diagram    Angiographic findings  Native coronary lesions:  ï¾·Proximal LAD: Lesion 1: discrete, 30 % stenosis, site of prior stent  ï¾·Mid LAD: Lesion 1: 0 % stenosis, site of prior stent  ï¾·Distal LAD: Lesion 1: 0 % stenosis, site of prior stent  ï¾·Proximal circumflex: Lesion 1: 0 % stenosis, site of prior stent  ï¾·Proximal RCA: Lesion 1: 100 % stenosis      Hemodynamic tables    Pressures:  Baseline  Pressures:  - HR: 66  Pressures:  - Rhythm:  Pressures:  -- Aortic Pressure (S/D/M): 153/61/76  Pressures:  -- Left Ventricle (s/edp): 139/12/--  Pressures:  -- Left Ventricle (s/edp): 139/19/--    Outputs:  Baseline  Outputs:  -- CALCULATIONS: [age]  Outputs:  -- CALCULATIONS: Body Surface Area: 2 36  Outputs:  -- CALCULATIONS: Height in cm: 173 00  Outputs:  -- CALCULATIONS: Sex: Female  Outputs:  -- CALCULATIONS: Weight in k 00    --------------------------------------------------------------------------------  ECHO:   Results for orders placed during the hospital encounter of 21    Echo complete with contrast if indicated    Narrative  [address] [facility]    Transthoracic Echocardiogram  2D, M-mode, Doppler, and Color Doppler    Study date:  24-Aug-2021    Patient: Sandrine Gore  MR number: [id number]  Account number: [de-identified]  : 1960  Age: 64 years  Gender: Female  Status: Inpatient  [facility]: [facility]  Height: 68 in  Weight: 312 4 lb  BP: 149/ 79 mmHg    Indications: Heart failure    Diagnoses: I50 9 - Heart failure, unspecified    Sonographer:  CASANDRA Wilcox  Interpreting Physician:  Romayne Limb, D O  Primary Physician:  Shayla Tuttle MD  Referring Physician:  CHERELLE Banuelos  Group:  [facility]    SUMMARY    LEFT VENTRICLE:  Systolic function was mildly reduced by visual assessment  Ejection fraction was estimated to be 45 %  There were regional wall motion abnormalities that suggest coronary artery disease  There was severe hypokinesis of the basal-mid anteroseptal and basal-mid inferoseptal wall(s)  There was moderate hypokinesis of the basal-mid inferior wall(s)  Wall thickness was mildly increased  There was mild concentric hypertrophy  Features were consistent with grade 2 diastolic dysfunction  Doppler parameters were consistent with high ventricular filling pressure  E/E' was > 19    VENTRICULAR SEPTUM:  There was dyssynergic motion  These changes are consistent with LBBB  MITRAL VALVE:  There was mild "progressive" mitral stenosis  Mean gradient of 5 mmHg at 73 bpm  MVA by Doppler continuity equation is 2 15 cm2  TRICUSPID VALVE:  There was mild regurgitation  PULMONIC VALVE:  There was trace regurgitation  PERICARDIUM:  A trace pericardial effusion was identified  HISTORY: PRIOR HISTORY: CAD, CKD Risk factors: hypertension and diabetes  PROCEDURE: The study was performed in the [place] [address]  This was a routine study  The transthoracic approach was used   The study included complete 2D imaging, M-mode, complete spectral Doppler, and color Doppler  The heart rate was 76  bpm, at the start of the study  Images were obtained from the parasternal, apical, subcostal, and suprasternal notch acoustic windows  Image quality was adequate  LEFT VENTRICLE: Size was normal  Systolic function was mildly reduced by visual assessment  Ejection fraction was estimated to be 45 %  There were regional wall motion abnormalities that suggest coronary artery disease  There was severe hypokinesis of the basal-mid anteroseptal and basal-mid inferoseptal wall(s)  There was moderate hypokinesis of the basal-mid inferior wall(s)  Wall thickness was mildly increased  There was mild concentric hypertrophy  DOPPLER: Features were consistent with grade 2 diastolic dysfunction  Doppler parameters were consistent with high ventricular filling pressure  E/E' was > 19    VENTRICULAR SEPTUM: There was dyssynergic motion  These changes are consistent with LBBB  RIGHT VENTRICLE: The size was normal  Systolic function was normal  Wall thickness was normal     LEFT ATRIUM: Size was within the limits of normal when indexed for body surface area  LA volume index 31 ml/m2  RIGHT ATRIUM: Size was normal     MITRAL VALVE: Valve structure was normal  There was mild thickening  There was normal leaflet separation  There was mildly restricted mobility of the anterior leaflet  DOPPLER: The transmitral velocity was within the normal range  There  was mild "progressive" mitral stenosis  Mean gradient of 5 mmHg at 73 bpm  MVA by Doppler continuity equation is 2 15 cm2  There was no regurgitation  AORTIC VALVE: The valve was trileaflet  Leaflets exhibited normal thickness and normal cuspal separation  DOPPLER: Transaortic velocity was within the normal range  There was no evidence for stenosis  There was no regurgitation  TRICUSPID VALVE: The valve structure was normal  There was normal leaflet separation  DOPPLER: The transtricuspid velocity was within the normal range  There was no evidence for stenosis  There was mild regurgitation  Estimated peak PA  pressure was 34 mmHg  PULMONIC VALVE: Not well visualized  DOPPLER: There was no evidence for stenosis  There was trace regurgitation  PERICARDIUM: A trace pericardial effusion was identified  The pericardium was normal in appearance  AORTA: The root exhibited normal size  SYSTEMIC VEINS: IVC: The inferior vena cava was normal in size and course  Respirophasic changes were normal     SYSTEM MEASUREMENT TABLES    2D  %FS: 25 15 %  Ao Diam: 2 77 cm  EDV(Teich): 136 76 ml  EF(Teich): 49 26 %  ESV(Teich): 69 39 ml  IVSd: 1 07 cm  LA Area: 30 26 cm2  LA Diam: 4 91 cm  LVEDV MOD A4C: 210 21 ml  LVEF MOD A4C: 46 53 %  LVESV MOD A4C: 112 41 ml  LVIDd: 5 32 cm  LVIDs: 3 99 cm  LVLd A4C: 9 67 cm  LVLs A4C: 8 21 cm  LVPWd: 1 1 cm  RA Area: 18 38 cm2  RVIDd: 3 28 cm  SV MOD A4C: 97 8 ml  SV(Teich): 67 38 ml    CW  TR Vmax: 2 78 m/s  TR maxP 99 mmHg    MM  TAPSE: 2 42 cm    PW  E' Sept: 0 06 m/s  E/E' Sept: 19 84  MV A Santosh: 1 49 m/s  MV Dec Gosper: 9 08 m/s2  MV DecT: 137 82 ms  MV E Santosh: 1 25 m/s  MV E/A Ratio: 0 84  MV PHT: 39 97 ms  MVA By PHT: 5 5 cm2    [facility]    Prepared and electronically signed by    NICOLE Isbell  Signed 24-Aug-2021 14:54:43    No results found for this or any previous visit     --------------------------------------------------------------------------------  HOLTER  No results found for this or any previous visit      No results found for this or any previous visit     --------------------------------------------------------------------------------  CAROTIDS  No results found for this or any previous visit      --------------------------------------------------------------------------------  Diagnoses and all orders for this visit:    Chronic combined systolic and diastolic congestive heart failure (HCC)    Stable angina (HCC)    Type 2 diabetes mellitus with chronic kidney disease on chronic dialysis, with long-term current use of insulin (HCC)    ESRD (end stage renal disease) ([place] [address] )    Coronary artery disease involving native coronary artery of native heart without angina pectoris    Primary hypertension    NSVT (nonsustained ventricular tachycardia)    Dependence on renal dialysis ([place] [address] )    Suprapubic catheter (HCC)    Adrenal nodule (HCC)     ======================================================    Past Medical History:   Diagnosis Date   • Abnormal liver function    • Anemia    • Anxiety    • Arthritis    • CHF (congestive heart failure) ([facility])    • Chronic kidney disease     stage 3   • Chronic narcotic dependence ([facility])    • Chronic pain disorder     lower back, hands , neck and knees   • Coronary artery disease    • Depression    • Diabetes mellitus ([place] [address] )    • Elevated troponin 02/11/2022   • GERD (gastroesophageal reflux disease)     no meds at present   • Heart murmur     murmur   • Hyperlipidemia    • Hypertension    • Neurogenic bladder    • Open toe wound 12/2020    right big toe open calus but is dry at present   • Renal disorder    • Shortness of breath     exertion   • Skin ulcer of hand, limited to breakdown of skin ([place] [address] ) 02/25/2021   • Sleep apnea     doesn't use cpap   • Suprapubic catheter [facility])      Past Surgical History:   Procedure Laterality Date   • AMPUTATION     • ANGIOPLASTY  2017    5   • BREAST EXCISIONAL BIOPSY Left    • BREAST SURGERY     • CARDIAC CATHETERIZATION     • CARDIAC CATHETERIZATION N/A 7/1/2022    Procedure: Cardiac catheterization;  Surgeon: Yao Cain MD;  Location: [facility] CARDIAC CATH LAB; Service: Cardiology   • CARDIAC CATHETERIZATION N/A 7/1/2022    Procedure: Cardiac pci;  Surgeon: Yao Cain MD;  Location: [facility] CARDIAC CATH LAB;   Service: Cardiology   • CARPAL TUNNEL RELEASE Bilateral    • CERVICAL FUSION     • HYSTERECTOMY  2008   • IR SUPRAPUBIC CATHETER PLACEMENT  6/15/2021   • IR TUNNELED DIALYSIS CATHETER PLACEMENT  7/15/2022   • IR TUNNELED DIALYSIS CATHETER PLACEMENT  12/12/2022   • KNEE SURGERY     • OOPHORECTOMY  2008   • TX EXC B9 LESION MRGN XCP SK TG S/N/H/F/G 3 1-4 0CM N/A 12/21/2020    Procedure: EXCISION SEBACEOUS CYST X 5 SCALP;  Surgeon: Bijal Gomez MD;  Location: [facility] MAIN OR;  Service: General   • TOE AMPUTATION Left    • TRIGGER FINGER RELEASE Right     4th Finger         Medications  Current Outpatient Medications   Medication Sig Dispense Refill   • allopurinol (ZYLOPRIM) 300 mg tablet Take 1 tablet (300 mg total) by mouth daily 90 tablet 1   • aspirin (ECOTRIN LOW STRENGTH) 81 mg EC tablet Take 1 tablet (81 mg total) by mouth daily 90 tablet 1   • atorvastatin (LIPITOR) 40 mg tablet Take 1 tablet (40 mg total) by mouth daily 90 tablet 1   • Blood Glucose Monitoring Suppl (OneTouch Verio) w/Device KIT Use in the morning 1 kit 0   • calcitriol (ROCALTROL) 0 5 MCG capsule Take 1 capsule (0 5 mcg total) by mouth 3 (three) times a week 45 capsule 5   • carvedilol (COREG) 25 mg tablet Take 1 tablet (25 mg total) by mouth 2 (two) times a day with meals 180 tablet 2   • citalopram (CeleXA) 40 mg tablet Take 1 tablet (40 mg total) by mouth daily 90 tablet 1   • clopidogrel (PLAVIX) 75 mg tablet Take 1 tablet (75 mg total) by mouth daily 90 tablet 1   • Cyanocobalamin (Vitamin Deficiency System-B12) 1000 MCG/ML KIT Inject 1 mL (1,000 mcg total) into a muscle every 30 (thirty) days 6 mL 0   • docusate sodium (COLACE) 50 mg capsule Take 1 capsule (50 mg total) by mouth 2 (two) times a day 180 capsule 0   • Dulaglutide (Trulicity) 1 5 PG/5 9PM SOPN Inject 0 5 mL (1 5 mg total) under the skin once a week 6 mL 1   • glucose blood (OneTouch Verio) test strip Use as instructed 100 strip 6   • hydrALAZINE (APRESOLINE) 25 mg tablet Take 1 tablet (25 mg total) by mouth 3 (three) times a day Hold SBP <100 90 tablet 0   • Incontinence Supplies (Urinary Drainage Bag) MISC Attach one bag to suprapubic catheter as needed     • Incontinence Supplies (Urinary Leg Bag) KIT Attach one bag to suprapubic catheter morales as needed     • Insulin Glargine Solostar (Lantus SoloStar) 100 UNIT/ML SOPN Inject 0 25 mL (25 Units total) under the skin every 12 (twelve) hours 30 mL 0   • insulin lispro (HumaLOG) 100 units/mL injection pen INJECT 20 UNITS UNDER THE SKIN 3 (THREE) TIMES A DAY WITH MEALS 15 mL 2   • Insulin Pen Needle (BD Pen Needle Micro U/F) 32G X 6 MM MISC Use 5 (five) times a day 200 each 5   • Insulin Syringe-Needle U-100 (BD Veo Insulin Syringe U/F) 31G X 15/64" 0 5 ML MISC Inject under the skin 3 (three) times a day 100 each 2   • isosorbide mononitrate (IMDUR) 30 mg 24 hr tablet Take 1 tablet (30 mg total) by mouth every evening 30 tablet 0   • ketoconazole (NIZORAL) 2 % cream Apply to suprapubic catheter site twice daily  • Lancets (OneTouch Delica Plus [id number]) MISC USE TO TEST 3 TIMES DAILY 100 each 2   • levothyroxine 50 mcg tablet Take 1 tablet (50 mcg total) by mouth daily 90 tablet 0   • losartan (COZAAR) 25 mg tablet TAKE 1 TABLET (25 MG TOTAL) BY MOUTH DAILY   90 tablet 1   • nitroglycerin (NITROSTAT) 0 4 mg SL tablet Place 1 tablet (0 4 mg total) under the tongue every 5 (five) minutes as needed for chest pain 25 tablet 1   • nystatin (MYCOSTATIN) powder Apply topically 2 (two) times a day 30 g 1   • OLANZapine (ZyPREXA) 5 mg tablet TAKE 1 TABLET BY MOUTH AT BEDTIME 90 tablet 0   • ondansetron (ZOFRAN) 4 mg tablet Take 1 tablet (4 mg total) by mouth every 8 (eight) hours as needed for nausea or vomiting 12 tablet 0   • OneTouch Delica Lancets 09W MISC Use 3 (three) times a day 100 each 6   • OneTouch Ultra test strip USE 1 EACH DAILY USE AS INSTRUCTED 100 strip 2   • oxyCODONE (ROXICODONE) 5 immediate release tablet Take 1 tablet (5 mg total) by mouth every 8 (eight) hours as needed for severe pain Max Daily Amount: 15 mg 90 tablet 0   • pantoprazole (PROTONIX) 40 mg tablet Take 1 tablet (40 mg total) by mouth daily 180 tablet 1   • silver sulfadiazine (SILVADENE,SSD) 1 % cream Apply topically daily 50 g 0   • tiZANidine (ZANAFLEX) 4 mg tablet Take 1 tablet (4 mg total) by mouth every 8 (eight) hours as needed for muscle spasms 270 tablet 1   • Torsemide 40 MG TABS Take 40 mg by mouth daily 270 tablet 3   • naloxone (NARCAN) 4 mg/0 1 mL nasal spray Administer 1 spray into a nostril  If breathing does not return to normal or if breathing difficulty resumes after 2-3 minutes, give another dose in the other nostril using a new spray   (Patient not taking: Reported on 2023) 1 each 1     Current Facility-Administered Medications   Medication Dose Route Frequency Provider Last Rate Last Admin   • cyanocobalamin injection 1,000 mcg  1,000 mcg Intramuscular Q30 Days CHERELLE Nguyen   1,000 mcg at 23 1651        Allergies   Allergen Reactions   • Codeine      Other reaction(s): Nausea and Vomiting   • Latex Itching       Social History     Socioeconomic History   • Marital status:      Spouse name: Not on file   • Number of children: Not on file   • Years of education: Not on file   • Highest education level: Not on file   Occupational History   • Not on file   Tobacco Use   • Smoking status: Former     Packs/day: 1 00     Years: 35 00     Pack years: 35 00     Types: Cigarettes     Quit date:      Years since quittin 0   • Smokeless tobacco: Never   Vaping Use   • Vaping Use: Never used   Substance and Sexual Activity   • Alcohol use: Not Currently   • Drug use: Never   • Sexual activity: Not Currently   Other Topics Concern   • Not on file   Social History Narrative   • Not on file     Social Determinants of Health     Financial Resource Strain: Low Risk    • Difficulty of Paying Living Expenses: Not hard at all   Food Insecurity: No Food Insecurity   • Worried About Running Out of Food in the Last Year: Never true   • Ran Out of Food in the Last Year: Never true   Transportation Needs: No Transportation Needs   • Lack of Transportation (Medical): No   • Lack of Transportation (Non-Medical):  No   Physical Activity: Not on file   Stress: Not on file   Social Connections: Not on file   Intimate Partner Violence: Not on file   Housing Stability: Low Risk    • Unable to Pay for Housing in the Last Year: No   • Number of Places Lived in the Last Year: 1   • Unstable Housing in the Last Year: No        Family History   Problem Relation Age of Onset   • Stroke Father    • Heart disease Father    • No Known Problems Mother    • No Known Problems Sister    • No Known Problems Daughter    • No Known Problems Maternal Grandmother    • No Known Problems Maternal Grandfather    • No Known Problems Paternal Grandmother    • No Known Problems Paternal Grandfather    • No Known Problems Maternal Aunt    • No Known Problems Maternal Aunt    • No Known Problems Maternal Aunt    • No Known Problems Maternal Aunt    • No Known Problems Maternal Aunt    • No Known Problems Maternal Aunt    • No Known Problems Paternal Aunt        Lab Results   Component Value Date    WBC 8 85 01/17/2023    HGB 12 1 01/17/2023    HCT 36 8 01/17/2023     (H) 01/17/2023     01/17/2023      Lab Results   Component Value Date    SODIUM 137 01/17/2023    K 3 8 01/17/2023     01/17/2023    CO2 30 01/17/2023    BUN 40 (H) 01/17/2023    CREATININE 2 38 (H) 01/17/2023    GLUC 65 (L) 01/17/2023    CALCIUM 8 6 01/17/2023      Lab Results   Component Value Date    HGBA1C 6 7 (A) 01/05/2023      No results found for: CHOL  Lab Results   Component Value Date    HDL 42 (L) 06/24/2022    HDL 30 (L) 08/03/2021    HDL 45 12/18/2020     Lab Results   Component Value Date    LDLCALC 50 06/24/2022    LDLCALC 86 08/03/2021    LDLCALC 122 12/18/2020     Lab Results   Component Value Date    TRIG 138 06/24/2022    TRIG 180 (H) 08/03/2021    TRIG 162 (H) 12/18/2020     No results found for: [place], [place]   Lab Results   Component Value Date INR 1 05 07/06/2022    INR 1 21 (H) 06/22/2022    INR 1 03 08/23/2021    PROTIME 13 4 07/06/2022    PROTIME 14 9 (H) 06/22/2022    PROTIME 13 6 08/23/2021          Patient Active Problem List    Diagnosis Date Noted   • Chronic combined systolic and diastolic congestive heart failure ([address] ) 08/24/2021   • Prolonged QT interval 08/24/2021   • History of heart artery stent 06/15/2021   • Severe episode of recurrent major depressive disorder, without psychotic features ([address] ) 05/25/2021   • HTN (hypertension) 12/21/2020   • Problem with vascular access 11/11/2020   • Anemia 09/09/2020   • S/P cervical spinal fusion 09/09/2020   • Major depressive disorder 09/02/2020   • Type 2 diabetes mellitus with chronic kidney disease on chronic dialysis, with long-term current use of insulin ([facility] ) 09/02/2020   • CAD (coronary artery disease) 09/02/2020   • Dependence on renal dialysis ([address] ) 01/05/2023   • Polyarthralgia 12/31/2022   • Suprapubic catheter ([address] ) 12/12/2022   • NSVT (nonsustained ventricular tachycardia) 07/08/2022   • Chest pain 06/22/2022   • Other artificial openings of urinary tract status ([address] ) 02/10/2022   • Continuous opioid dependence ([facility] ) 02/10/2022   • Adrenal nodule ([facility] [facility] ) 02/10/2022   • Stable angina ([facility] [facility] ) 02/10/2022   • Acquired hypothyroidism 10/14/2021   • Mixed hyperlipidemia 10/14/2021   • Gastroesophageal reflux disease without esophagitis 10/13/2021   • Other proteinuria 09/14/2021   • ESRD (end stage renal disease) ([facility] [facility] ) 06/04/2021       Portions of the record may have been created with voice recognition software  Occasional wrong word or "sound a like" substitutions may have occurred due to the inherent limitations of voice recognition software  Read the chart carefully and recognize, using context, where substitutions have occurred      Susan More DO, [facility]  1/27/2023 10:43 AM

## 2023-01-27 NOTE — CASE COMMUNICATION
Patient has appt with cardiology 1/27 and this is primary focus of care  Spoke with patient and instructed her to bring all pill bottles with to her cardiology appt  Confirmed that she did  the hydralazine from the pharmacy and reports compliance with taking it TID  She denies having any issues with urethral cath with the exception of discomfort  Denies having any drainage or issues at former [facility] cath site  Instructed her or her oswaldo zarate to monitor the SP site  She verbalizes understanding of same  Josefa De Oliveira, this is an FYI as SN is ordered to have 2 x week SN visits, but this week the patient will only be seen once due to the cardiology appt today  Thank you

## 2023-01-27 NOTE — CASE COMMUNICATION
STACEY pratt  to [facility]) urology and spoke with Ginger GARCÍA [facility] unable to reinsert SP catheter and ED was unable to reinsert SP catheter  Therefore a urethral cath was placed in ED on 1/23/23 and patient was sent home  Sn notified that the suprapubic catheter orders would be discontinued at this time    -Received orders to apply dry dressing PRN for leakage at [facility] site, but typically the former SP site closes up within 48 to 72 hours  -Received orders  for 16fr 10ml urethral catheter changes q 4 to 6 weeks and prn for leakage or blockage   -Received orders for catheter flushes as well  -Received orders for 2 PRN visits for catheter leakage or blockage  Dr Daniela Miller is seeing this patient on 2/1/23 and plans to perform cysto and replace urethral catheter at that time and will also plan to refer to IR for another suprapubic catheter placement  Careplan updated  no

## 2023-01-30 ENCOUNTER — HOME CARE VISIT (OUTPATIENT)
Dept: HOME HEALTH SERVICES | Facility: HOME HEALTHCARE | Age: 63
End: 2023-01-30

## 2023-01-31 DIAGNOSIS — I10 PRIMARY HYPERTENSION: Primary | ICD-10-CM

## 2023-01-31 RX ORDER — FUROSEMIDE 80 MG
80 TABLET ORAL DAILY
Qty: 90 TABLET | Refills: 3 | Status: SHIPPED | OUTPATIENT
Start: 2023-01-31

## 2023-02-01 ENCOUNTER — TELEPHONE (OUTPATIENT)
Dept: NEPHROLOGY | Facility: CLINIC | Age: 63
End: 2023-02-01

## 2023-02-01 ENCOUNTER — HOME CARE VISIT (OUTPATIENT)
Dept: HOME HEALTH SERVICES | Facility: HOME HEALTHCARE | Age: 63
End: 2023-02-01

## 2023-02-01 ENCOUNTER — PROCEDURE VISIT (OUTPATIENT)
Dept: UROLOGY | Facility: CLINIC | Age: 63
End: 2023-02-01

## 2023-02-01 VITALS
SYSTOLIC BLOOD PRESSURE: 160 MMHG | TEMPERATURE: 96.4 F | RESPIRATION RATE: 18 BRPM | OXYGEN SATURATION: 97 % | DIASTOLIC BLOOD PRESSURE: 76 MMHG | HEART RATE: 61 BPM

## 2023-02-01 VITALS
WEIGHT: 244 LBS | BODY MASS INDEX: 36.98 KG/M2 | HEIGHT: 68 IN | DIASTOLIC BLOOD PRESSURE: 80 MMHG | HEART RATE: 78 BPM | SYSTOLIC BLOOD PRESSURE: 130 MMHG

## 2023-02-01 DIAGNOSIS — N31.9 NEUROGENIC BLADDER: Primary | ICD-10-CM

## 2023-02-01 RX ORDER — SODIUM CHLORIDE 9 MG/ML
125 INJECTION, SOLUTION INTRAVENOUS CONTINUOUS
Status: CANCELLED | OUTPATIENT
Start: 2023-02-06

## 2023-02-01 NOTE — PROGRESS NOTES
2/1/2023    Olney Waverly  1960  [id number]        Assessment  History of neurogenic bladder secondary to diabetic neuropathy, history of CHF and end-stage renal disease on dialysis      Discussion  I recommend that she undergo replacement of her suprapubic tube in interventional radiology  Although she does not make a large amount of urine she still produces urine nonetheless and is unable to void  She is not able to perform clean intermittent catheterization  Once the suprapubic tube has been placed and the tract heals, we discussed that if her urine output is low she can simply And uncap her to periodically throughout the day and not have to use a drainage bag  I will defer cystoscopy until approximately 3 months with her primary urologist Dr Dania Raoz   The patient is amenable with this plan today  History of Present Illness  63 y o  female with a history of neurogenic bladder secondary to diabetic neuropathy  She had her initial suprapubic tube placed by interventional radiology in June 2021  More recently the suprapubic tube was dislodged  She was seen in the emergency room and an indwelling [place] catheter was placed  She scheduled today for a routine cystoscopy, however, she is scheduled to return to interventional radiology for replacement of her suprapubic tube  We discussed that she likely will have inflammatory changes within her bladder from her indwelling [place] catheter  She denies any hematuria  AUA Symptom Score      Review of Systems  Review of Systems   Constitutional: Negative  HENT: Negative  Eyes: Negative  Respiratory: Negative  Cardiovascular: Negative  Gastrointestinal: Negative  Endocrine: Negative  Genitourinary:        Per HPI   Musculoskeletal: Negative  Skin: Negative  Allergic/Immunologic: Negative  Neurological: Negative  Hematological: Negative  Psychiatric/Behavioral: Negative            Past Medical History  Past Medical History:   Diagnosis Date   • Abnormal liver function    • Anemia    • Anxiety    • Arthritis    • CHF (congestive heart failure) ([facility])    • Chronic kidney disease     stage 3   • Chronic narcotic dependence (HCC)    • Chronic pain disorder     lower back, hands , neck and knees   • Coronary artery disease    • Depression    • Diabetes mellitus ([facility] )    • Elevated troponin 02/11/2022   • GERD (gastroesophageal reflux disease)     no meds at present   • Heart murmur     murmur   • Hyperlipidemia    • Hypertension    • Neurogenic bladder    • Open toe wound 12/2020    right big toe open calus but is dry at present   • Renal disorder    • Shortness of breath     exertion   • Skin ulcer of hand, limited to breakdown of skin ([facility] ) 02/25/2021   • Sleep apnea     doesn't use cpap   • Suprapubic catheter [facility])        Past Social History  Past Surgical History:   Procedure Laterality Date   • AMPUTATION     • ANGIOPLASTY  2017 5   • BREAST EXCISIONAL BIOPSY Left    • BREAST SURGERY     • CARDIAC CATHETERIZATION     • CARDIAC CATHETERIZATION N/A 07/01/2022    Procedure: Cardiac catheterization;  Surgeon: Alison Suazo MD;  Location: [facility]; Service: Cardiology   • CARDIAC CATHETERIZATION N/A 07/01/2022    Procedure: Cardiac pci;  Surgeon: Alison Suazo MD;  Location: [facility];   Service: Cardiology   • CARPAL TUNNEL RELEASE Bilateral    • CERVICAL FUSION     • CYSTOSCOPY  02/01/2023    Nadira   • HYSTERECTOMY  2008   • IR SUPRAPUBIC CATHETER PLACEMENT  06/15/2021   • IR TUNNELED DIALYSIS CATHETER PLACEMENT  07/15/2022   • IR TUNNELED DIALYSIS CATHETER PLACEMENT  12/12/2022   • KNEE SURGERY     • OOPHORECTOMY  2008   • MS EXC B9 LESION MRGN XCP SK TG S/N/H/F/G 3 1-4 0CM N/A 12/21/2020    Procedure: EXCISION SEBACEOUS CYST X 5 SCALP;  Surgeon: Jose De Jesus Li MD;  Location: [address] [place] [address];  Service: General   • TOE AMPUTATION Left    • TRIGGER FINGER RELEASE Right     4th Finger       Past Family History  Family History   Problem Relation Age of Onset   • Stroke Father    • Heart disease Father    • No Known Problems Mother    • No Known Problems Sister    • No Known Problems Daughter    • No Known Problems Maternal Grandmother    • No Known Problems Maternal Grandfather    • No Known Problems Paternal Grandmother    • No Known Problems Paternal Grandfather    • No Known Problems Maternal Aunt    • No Known Problems Maternal Aunt    • No Known Problems Maternal Aunt    • No Known Problems Maternal Aunt    • No Known Problems Maternal Aunt    • No Known Problems Maternal Aunt    • No Known Problems Paternal Aunt        Past Social history  Social History     Socioeconomic History   • Marital status:      Spouse name: Not on file   • Number of children: Not on file   • Years of education: Not on file   • Highest education level: Not on file   Occupational History   • Not on file   Tobacco Use   • Smoking status: Former     Packs/day: 1 00     Years: 35 00     Pack years: 35 00     Types: Cigarettes     Quit date:      Years since quittin 0   • Smokeless tobacco: Never   Vaping Use   • Vaping Use: Never used   Substance and Sexual Activity   • Alcohol use: Not Currently   • Drug use: Never   • Sexual activity: Not Currently   Other Topics Concern   • Not on file   Social History Narrative   • Not on file     Social Determinants of Health     Financial Resource Strain: Low Risk    • Difficulty of Paying Living Expenses: Not hard at all   Food Insecurity: No Food Insecurity   • Worried About Running Out of Food in the Last Year: Never true   • Ran Out of Food in the Last Year: Never true   Transportation Needs: No Transportation Needs   • Lack of Transportation (Medical): No   • Lack of Transportation (Non-Medical):  No   Physical Activity: Not on file   Stress: Not on file   Social Connections: Not on file   Intimate Partner Violence: Not on file   Housing Stability: Low Risk    • Unable to Pay for Housing in the Last Year: No   • Number of Places Lived in the Last Year: 1   • Unstable Housing in the Last Year: No       Current Medications  Current Outpatient Medications   Medication Sig Dispense Refill   • allopurinol (ZYLOPRIM) 300 mg tablet Take 1 tablet (300 mg total) by mouth daily 90 tablet 1   • aspirin (ECOTRIN LOW STRENGTH) 81 mg EC tablet Take 1 tablet (81 mg total) by mouth daily 90 tablet 1   • atorvastatin (LIPITOR) 40 mg tablet Take 1 tablet (40 mg total) by mouth daily 90 tablet 1   • Blood Glucose Monitoring Suppl (OneTouch Verio) w/Device KIT Use in the morning 1 kit 0   • calcitriol (ROCALTROL) 0 5 MCG capsule Take 1 capsule (0 5 mcg total) by mouth 3 (three) times a week 45 capsule 5   • carvedilol (COREG) 25 mg tablet Take 1 tablet (25 mg total) by mouth 2 (two) times a day with meals 180 tablet 2   • citalopram (CeleXA) 40 mg tablet Take 1 tablet (40 mg total) by mouth daily 90 tablet 1   • clopidogrel (PLAVIX) 75 mg tablet Take 1 tablet (75 mg total) by mouth daily 90 tablet 1   • Cyanocobalamin (Vitamin Deficiency System-B12) 1000 MCG/ML KIT Inject 1 mL (1,000 mcg total) into a muscle every 30 (thirty) days 6 mL 0   • docusate sodium (COLACE) 50 mg capsule Take 1 capsule (50 mg total) by mouth 2 (two) times a day 180 capsule 0   • Dulaglutide (Trulicity) 1 5 VO/1 6QN SOPN Inject 0 5 mL (1 5 mg total) under the skin once a week 6 mL 1   • furosemide (LASIX) 80 mg tablet Take 1 tablet (80 mg total) by mouth daily 90 tablet 3   • glucose blood (OneTouch Verio) test strip Use as instructed 100 strip 6   • hydrALAZINE (APRESOLINE) 25 mg tablet Take 1 tablet (25 mg total) by mouth 3 (three) times a day Hold SBP <100 90 tablet 0   • Incontinence Supplies (Urinary Drainage Bag) MISC Attach one bag to suprapubic catheter as needed     • Incontinence Supplies (Urinary Leg Bag) KIT Attach one bag to suprapubic catheter morales as needed     • Insulin Glargine Solostar (Lantus SoloStar) 100 UNIT/ML SOPN Inject 0 25 mL (25 Units total) under the skin every 12 (twelve) hours 30 mL 0   • insulin lispro (HumaLOG) 100 units/mL injection pen INJECT 20 UNITS UNDER THE SKIN 3 (THREE) TIMES A DAY WITH MEALS 15 mL 2   • Insulin Pen Needle (BD Pen Needle Micro U/F) 32G X 6 MM MISC Use 5 (five) times a day 200 each 5   • Insulin Syringe-Needle U-100 (BD Veo Insulin Syringe U/F) 31G X 15/64" 0 5 ML MISC Inject under the skin 3 (three) times a day 100 each 2   • isosorbide mononitrate (IMDUR) 30 mg 24 hr tablet Take 1 tablet (30 mg total) by mouth every evening 30 tablet 0   • ketoconazole (NIZORAL) 2 % cream Apply to suprapubic catheter site twice daily  • Lancets (OneTouch Delica Plus [id number]) MISC USE TO TEST 3 TIMES DAILY 100 each 2   • levothyroxine 50 mcg tablet Take 1 tablet (50 mcg total) by mouth daily 90 tablet 0   • losartan (COZAAR) 25 mg tablet TAKE 1 TABLET (25 MG TOTAL) BY MOUTH DAILY  90 tablet 1   • naloxone (NARCAN) 4 mg/0 1 mL nasal spray Administer 1 spray into a nostril  If breathing does not return to normal or if breathing difficulty resumes after 2-3 minutes, give another dose in the other nostril using a new spray   1 each 1   • nitroglycerin (NITROSTAT) 0 4 mg SL tablet Place 1 tablet (0 4 mg total) under the tongue every 5 (five) minutes as needed for chest pain 25 tablet 1   • nystatin (MYCOSTATIN) powder Apply topically 2 (two) times a day 30 g 1   • OLANZapine (ZyPREXA) 5 mg tablet TAKE 1 TABLET BY MOUTH AT BEDTIME 90 tablet 0   • ondansetron (ZOFRAN) 4 mg tablet Take 1 tablet (4 mg total) by mouth every 8 (eight) hours as needed for nausea or vomiting 12 tablet 0   • OneTouch Delica Lancets 41C MISC Use 3 (three) times a day 100 each 6   • OneTouch Ultra test strip USE 1 EACH DAILY USE AS INSTRUCTED 100 strip 2   • oxyCODONE (ROXICODONE) 5 immediate release tablet Take 1 tablet (5 mg total) by mouth every 8 (eight) hours as needed for severe pain Max Daily Amount: 15 mg 90 tablet 0   • pantoprazole (PROTONIX) 40 mg tablet Take 1 tablet (40 mg total) by mouth daily 180 tablet 1   • silver sulfadiazine (SILVADENE,SSD) 1 % cream Apply topically daily 50 g 0   • tiZANidine (ZANAFLEX) 4 mg tablet Take 1 tablet (4 mg total) by mouth every 8 (eight) hours as needed for muscle spasms 270 tablet 1     Current Facility-Administered Medications   Medication Dose Route Frequency Provider Last Rate Last Admin   • cyanocobalamin injection 1,000 mcg  1,000 mcg Intramuscular Q30 Days CHERELLE Mckay   1,000 mcg at 01/05/23 1651       Allergies  Allergies   Allergen Reactions   • Codeine      Other reaction(s): Nausea and Vomiting   • Latex Itching       Past Medical History, Social History, Family History, medications and allergies were reviewed  Vitals  Vitals:    02/01/23 1334   Weight: 111 kg (244 lb)   Height: 5' 8" (1 727 m)       Physical Exam  Physical Exam  Nation she is in no acute distress  Gait is slow with the assistance of a cane      Results  No results found for: PSA  Lab Results   Component Value Date    CALCIUM 8 6 01/17/2023    K 3 8 01/17/2023    CO2 30 01/17/2023     01/17/2023    BUN 40 (H) 01/17/2023    CREATININE 2 38 (H) 01/17/2023     Lab Results   Component Value Date    WBC 8 85 01/17/2023    HGB 12 1 01/17/2023    HCT 36 8 01/17/2023     (H) 01/17/2023     01/17/2023         Office Urine Dip  No results found for this or any previous visit (from the past 1 hour(s)) ]

## 2023-02-01 NOTE — TELEPHONE ENCOUNTER
Called patient and left a voicemail stating that we are canceling her upcoming appointment due to her being a dialysis patient and we do not follow in the office but at the clinic  I asked the patient call back to ensure she received this message

## 2023-02-01 NOTE — TELEPHONE ENCOUNTER
----- Message from Javier Ta MD sent at 1/31/2023  2:48 PM EST -----  Yes cancel the appointment   ----- Message -----  From: Steffanie Orellana  Sent: 1/31/2023   2:44 PM EST  To: Javier Ta MD    Patient is on dialysis, would you like me to cancel this appointment?

## 2023-02-01 NOTE — H&P (VIEW-ONLY)
2/1/2023    Teresita Ray  1960  [id number]        Assessment  History of neurogenic bladder secondary to diabetic neuropathy, history of CHF and end-stage renal disease on dialysis      Discussion  I recommend that she undergo replacement of her suprapubic tube in interventional radiology  Although she does not make a large amount of urine she still produces urine nonetheless and is unable to void  She is not able to perform clean intermittent catheterization  Once the suprapubic tube has been placed and the tract heals, we discussed that if her urine output is low she can simply And uncap her to periodically throughout the day and not have to use a drainage bag  I will defer cystoscopy until approximately 3 months with her primary urologist Dr Mark Frausto   The patient is amenable with this plan today  History of Present Illness  63 y o  female with a history of neurogenic bladder secondary to diabetic neuropathy  She had her initial suprapubic tube placed by interventional radiology in June 2021  More recently the suprapubic tube was dislodged  She was seen in the emergency room and an indwelling [place] catheter was placed  She scheduled today for a routine cystoscopy, however, she is scheduled to return to interventional radiology for replacement of her suprapubic tube  We discussed that she likely will have inflammatory changes within her bladder from her indwelling [place] catheter  She denies any hematuria  AUA Symptom Score      Review of Systems  Review of Systems   Constitutional: Negative  HENT: Negative  Eyes: Negative  Respiratory: Negative  Cardiovascular: Negative  Gastrointestinal: Negative  Endocrine: Negative  Genitourinary:        Per HPI   Musculoskeletal: Negative  Skin: Negative  Allergic/Immunologic: Negative  Neurological: Negative  Hematological: Negative  Psychiatric/Behavioral: Negative            Past Medical History  Past Medical History:   Diagnosis Date   • Abnormal liver function    • Anemia    • Anxiety    • Arthritis    • CHF (congestive heart failure) ([facility])    • Chronic kidney disease     stage 3   • Chronic narcotic dependence (HCC)    • Chronic pain disorder     lower back, hands , neck and knees   • Coronary artery disease    • Depression    • Diabetes mellitus ([facility] )    • Elevated troponin 02/11/2022   • GERD (gastroesophageal reflux disease)     no meds at present   • Heart murmur     murmur   • Hyperlipidemia    • Hypertension    • Neurogenic bladder    • Open toe wound 12/2020    right big toe open calus but is dry at present   • Renal disorder    • Shortness of breath     exertion   • Skin ulcer of hand, limited to breakdown of skin ([facility] ) 02/25/2021   • Sleep apnea     doesn't use cpap   • Suprapubic catheter [facility])        Past Social History  Past Surgical History:   Procedure Laterality Date   • AMPUTATION     • ANGIOPLASTY  2017 5   • BREAST EXCISIONAL BIOPSY Left    • BREAST SURGERY     • CARDIAC CATHETERIZATION     • CARDIAC CATHETERIZATION N/A 07/01/2022    Procedure: Cardiac catheterization;  Surgeon: Juliet Copeland MD;  Location: [facility]; Service: Cardiology   • CARDIAC CATHETERIZATION N/A 07/01/2022    Procedure: Cardiac pci;  Surgeon: Juliet Copeland MD;  Location: [facility];   Service: Cardiology   • CARPAL TUNNEL RELEASE Bilateral    • CERVICAL FUSION     • CYSTOSCOPY  02/01/2023    Nad[facility]   • HYSTERECTOMY  2008   • IR SUPRAPUBIC CATHETER PLACEMENT  06/15/2021   • IR TUNNELED DIALYSIS CATHETER PLACEMENT  07/15/2022   • IR TUNNELED DIALYSIS CATHETER PLACEMENT  12/12/2022   • KNEE SURGERY     • OOPHORECTOMY  2008   • AR EXC B9 LESION MRGN XCP SK TG S/N/H/F/G 3 1-4 0CM N/A 12/21/2020    Procedure: EXCISION SEBACEOUS CYST X 5 SCALP;  Surgeon: Tracy Olguin MD;  Location: [address] [place] [address];  Service: General   • TOE AMPUTATION Left    • TRIGGER FINGER RELEASE Right     4th Finger       Past Family History  Family History   Problem Relation Age of Onset   • Stroke Father    • Heart disease Father    • No Known Problems Mother    • No Known Problems Sister    • No Known Problems Daughter    • No Known Problems Maternal Grandmother    • No Known Problems Maternal Grandfather    • No Known Problems Paternal Grandmother    • No Known Problems Paternal Grandfather    • No Known Problems Maternal Aunt    • No Known Problems Maternal Aunt    • No Known Problems Maternal Aunt    • No Known Problems Maternal Aunt    • No Known Problems Maternal Aunt    • No Known Problems Maternal Aunt    • No Known Problems Paternal Aunt        Past Social history  Social History     Socioeconomic History   • Marital status:      Spouse name: Not on file   • Number of children: Not on file   • Years of education: Not on file   • Highest education level: Not on file   Occupational History   • Not on file   Tobacco Use   • Smoking status: Former     Packs/day: 1 00     Years: 35 00     Pack years: 35 00     Types: Cigarettes     Quit date:      Years since quittin 0   • Smokeless tobacco: Never   Vaping Use   • Vaping Use: Never used   Substance and Sexual Activity   • Alcohol use: Not Currently   • Drug use: Never   • Sexual activity: Not Currently   Other Topics Concern   • Not on file   Social History Narrative   • Not on file     Social Determinants of Health     Financial Resource Strain: Low Risk    • Difficulty of Paying Living Expenses: Not hard at all   Food Insecurity: No Food Insecurity   • Worried About Running Out of Food in the Last Year: Never true   • Ran Out of Food in the Last Year: Never true   Transportation Needs: No Transportation Needs   • Lack of Transportation (Medical): No   • Lack of Transportation (Non-Medical):  No   Physical Activity: Not on file   Stress: Not on file   Social Connections: Not on file   Intimate Partner Violence: Not on file   Housing Stability: Low Risk    • Unable to Pay for Housing in the Last Year: No   • Number of Places Lived in the Last Year: 1   • Unstable Housing in the Last Year: No       Current Medications  Current Outpatient Medications   Medication Sig Dispense Refill   • allopurinol (ZYLOPRIM) 300 mg tablet Take 1 tablet (300 mg total) by mouth daily 90 tablet 1   • aspirin (ECOTRIN LOW STRENGTH) 81 mg EC tablet Take 1 tablet (81 mg total) by mouth daily 90 tablet 1   • atorvastatin (LIPITOR) 40 mg tablet Take 1 tablet (40 mg total) by mouth daily 90 tablet 1   • Blood Glucose Monitoring Suppl (OneTouch Verio) w/Device KIT Use in the morning 1 kit 0   • calcitriol (ROCALTROL) 0 5 MCG capsule Take 1 capsule (0 5 mcg total) by mouth 3 (three) times a week 45 capsule 5   • carvedilol (COREG) 25 mg tablet Take 1 tablet (25 mg total) by mouth 2 (two) times a day with meals 180 tablet 2   • citalopram (CeleXA) 40 mg tablet Take 1 tablet (40 mg total) by mouth daily 90 tablet 1   • clopidogrel (PLAVIX) 75 mg tablet Take 1 tablet (75 mg total) by mouth daily 90 tablet 1   • Cyanocobalamin (Vitamin Deficiency System-B12) 1000 MCG/ML KIT Inject 1 mL (1,000 mcg total) into a muscle every 30 (thirty) days 6 mL 0   • docusate sodium (COLACE) 50 mg capsule Take 1 capsule (50 mg total) by mouth 2 (two) times a day 180 capsule 0   • Dulaglutide (Trulicity) 1 5 FH/9 8QB SOPN Inject 0 5 mL (1 5 mg total) under the skin once a week 6 mL 1   • furosemide (LASIX) 80 mg tablet Take 1 tablet (80 mg total) by mouth daily 90 tablet 3   • glucose blood (OneTouch Verio) test strip Use as instructed 100 strip 6   • hydrALAZINE (APRESOLINE) 25 mg tablet Take 1 tablet (25 mg total) by mouth 3 (three) times a day Hold SBP <100 90 tablet 0   • Incontinence Supplies (Urinary Drainage Bag) MISC Attach one bag to suprapubic catheter as needed     • Incontinence Supplies (Urinary Leg Bag) KIT Attach one bag to suprapubic catheter morales as needed     • Insulin Glargine Solostar (Lantus SoloStar) 100 UNIT/ML SOPN Inject 0 25 mL (25 Units total) under the skin every 12 (twelve) hours 30 mL 0   • insulin lispro (HumaLOG) 100 units/mL injection pen INJECT 20 UNITS UNDER THE SKIN 3 (THREE) TIMES A DAY WITH MEALS 15 mL 2   • Insulin Pen Needle (BD Pen Needle Micro U/F) 32G X 6 MM MISC Use 5 (five) times a day 200 each 5   • Insulin Syringe-Needle U-100 (BD Veo Insulin Syringe U/F) 31G X 15/64" 0 5 ML MISC Inject under the skin 3 (three) times a day 100 each 2   • isosorbide mononitrate (IMDUR) 30 mg 24 hr tablet Take 1 tablet (30 mg total) by mouth every evening 30 tablet 0   • ketoconazole (NIZORAL) 2 % cream Apply to suprapubic catheter site twice daily  • Lancets (OneTouch Delica Plus [id number]) MISC USE TO TEST 3 TIMES DAILY 100 each 2   • levothyroxine 50 mcg tablet Take 1 tablet (50 mcg total) by mouth daily 90 tablet 0   • losartan (COZAAR) 25 mg tablet TAKE 1 TABLET (25 MG TOTAL) BY MOUTH DAILY  90 tablet 1   • naloxone (NARCAN) 4 mg/0 1 mL nasal spray Administer 1 spray into a nostril  If breathing does not return to normal or if breathing difficulty resumes after 2-3 minutes, give another dose in the other nostril using a new spray   1 each 1   • nitroglycerin (NITROSTAT) 0 4 mg SL tablet Place 1 tablet (0 4 mg total) under the tongue every 5 (five) minutes as needed for chest pain 25 tablet 1   • nystatin (MYCOSTATIN) powder Apply topically 2 (two) times a day 30 g 1   • OLANZapine (ZyPREXA) 5 mg tablet TAKE 1 TABLET BY MOUTH AT BEDTIME 90 tablet 0   • ondansetron (ZOFRAN) 4 mg tablet Take 1 tablet (4 mg total) by mouth every 8 (eight) hours as needed for nausea or vomiting 12 tablet 0   • OneTouch Delica Lancets 67A MISC Use 3 (three) times a day 100 each 6   • OneTouch Ultra test strip USE 1 EACH DAILY USE AS INSTRUCTED 100 strip 2   • oxyCODONE (ROXICODONE) 5 immediate release tablet Take 1 tablet (5 mg total) by mouth every 8 (eight) hours as needed for severe pain Max Daily Amount: 15 mg 90 tablet 0   • pantoprazole (PROTONIX) 40 mg tablet Take 1 tablet (40 mg total) by mouth daily 180 tablet 1   • silver sulfadiazine (SILVADENE,SSD) 1 % cream Apply topically daily 50 g 0   • tiZANidine (ZANAFLEX) 4 mg tablet Take 1 tablet (4 mg total) by mouth every 8 (eight) hours as needed for muscle spasms 270 tablet 1     Current Facility-Administered Medications   Medication Dose Route Frequency Provider Last Rate Last Admin   • cyanocobalamin injection 1,000 mcg  1,000 mcg Intramuscular Q30 Days CHERELLE Larson   1,000 mcg at 01/05/23 1651       Allergies  Allergies   Allergen Reactions   • Codeine      Other reaction(s): Nausea and Vomiting   • Latex Itching       Past Medical History, Social History, Family History, medications and allergies were reviewed  Vitals  Vitals:    02/01/23 1334   Weight: 111 kg (244 lb)   Height: 5' 8" (1 727 m)       Physical Exam  Physical Exam  Nation she is in no acute distress  Gait is slow with the assistance of a cane      Results  No results found for: PSA  Lab Results   Component Value Date    CALCIUM 8 6 01/17/2023    K 3 8 01/17/2023    CO2 30 01/17/2023     01/17/2023    BUN 40 (H) 01/17/2023    CREATININE 2 38 (H) 01/17/2023     Lab Results   Component Value Date    WBC 8 85 01/17/2023    HGB 12 1 01/17/2023    HCT 36 8 01/17/2023     (H) 01/17/2023     01/17/2023         Office Urine Dip  No results found for this or any previous visit (from the past 1 hour(s)) ]

## 2023-02-02 ENCOUNTER — ANESTHESIA EVENT (OUTPATIENT)
Dept: PERIOP | Facility: HOSPITAL | Age: 63
End: 2023-02-02

## 2023-02-02 ENCOUNTER — PATIENT OUTREACH (OUTPATIENT)
Dept: FAMILY MEDICINE CLINIC | Facility: CLINIC | Age: 63
End: 2023-02-02

## 2023-02-02 NOTE — PRE-PROCEDURE INSTRUCTIONS
Pre-Surgery Instructions:   Medication Instructions   • allopurinol (ZYLOPRIM) 300 mg tablet Take day of surgery  • aspirin (ECOTRIN LOW STRENGTH) 81 mg EC tablet Take day of surgery  • atorvastatin (LIPITOR) 40 mg tablet Take day of surgery  • calcitriol (ROCALTROL) 0 5 MCG capsule Take day of surgery  • carvedilol (COREG) 25 mg tablet Take day of surgery  • citalopram (CeleXA) 40 mg tablet Take day of surgery  • clopidogrel (PLAVIX) 75 mg tablet Take day of surgery  • Cyanocobalamin (Vitamin Deficiency System-B12) 1000 MCG/ML KIT Continue normal schedule   • docusate sodium (COLACE) 50 mg capsule Uses PRN- OK to take day of surgery   • Dulaglutide (Trulicity) 1 5 KL/5 5WW SOPN  Continue normal schedule   • furosemide (LASIX) 80 mg tablet Hold day of surgery  • hydrALAZINE (APRESOLINE) 25 mg tablet Take day of surgery  • Insulin Glargine Solostar (Lantus SoloStar) 100 UNIT/ML SOPN Take night before surgery   • insulin lispro (HumaLOG) 100 units/mL injection pen Hold day of surgery  • isosorbide mononitrate (IMDUR) 30 mg 24 hr tablet Take day of surgery  • ketoconazole (NIZORAL) 2 % cream Hold day of surgery  • levothyroxine 50 mcg tablet Take day of surgery  • losartan (COZAAR) 25 mg tablet Hold day of surgery  • naloxone (NARCAN) 4 mg/0 1 mL nasal spray Uses PRN- OK to take day of surgery   • nitroglycerin (NITROSTAT) 0 4 mg SL tablet Uses PRN- OK to take day of surgery   • nystatin (MYCOSTATIN) powder Hold day of surgery  • OLANZapine (ZyPREXA) 5 mg tablet Take night before surgery   • ondansetron (ZOFRAN) 4 mg tablet Uses PRN- OK to take day of surgery   • oxyCODONE (ROXICODONE) 5 immediate release tablet Uses PRN- OK to take day of surgery   • pantoprazole (PROTONIX) 40 mg tablet Take day of surgery  • tiZANidine (ZANAFLEX) 4 mg tablet Uses PRN- OK to take day of surgery   Covid screening negative as per patient  Fully vaccinated       Reviewed showering and medication instructions  Take medications morning of surgery with a small sip of water  Patient verbalized understanding  Advised NPO after MN and ASC will call with scheduled surgical time  Advised to contact surgeon's office for any illness prior to day of surgery

## 2023-02-02 NOTE — PROGRESS NOTES
I called the patient (per request of the patient when she spoke with [facility] earlier today)  She stated she just finished dialysis  The patient did report symptoms of nausea, headache, lethargy and had a bout of diarrhea at dialysis  I asked if the dialysis staff was aware she was not feeling well and she stated they did but did not refer her to the ED  The patient stated she wanted to "get in her time"  I provided ED precautions and the patient promised she will go if she worsens or does not improve  I will continue to follow

## 2023-02-02 NOTE — PROGRESS NOTES
Outgoing Calls:  2/2/2023    [facility] did complete a chart review and checked on status of waiver application  Status states application no longer active  [facility] called LANRE COSME to inquire about this and was informed pt was assessed by Malaika Gudino at [facility] and she determined pt does not meet criteria  She did report to PA CARMELINA pt is clinically ineligible  [facility] did call [employer] and left a detailed voice message with Kassi Zhang, Supervisor for assessment dept, to dispute this  Pt recently was receiving waiver services and her physical health condition has deteriorated since then  [facility] will assist pt in appealing this denial  [facility] did call pt and informed her of this  Pt was not feeling well and preparing to go to dialysis  [facility] updated pt on waiver denial and informed she will assist with filing an appeal  Pt thanked [facility] for her support  Pt stated that she is not well and would like a call from her Nurse, Alvaro Albright  Next outreach is scheduled for 2/9/2023

## 2023-02-03 ENCOUNTER — OFFICE VISIT (OUTPATIENT)
Dept: FAMILY MEDICINE CLINIC | Facility: CLINIC | Age: 63
End: 2023-02-03

## 2023-02-03 ENCOUNTER — PATIENT OUTREACH (OUTPATIENT)
Dept: FAMILY MEDICINE CLINIC | Facility: CLINIC | Age: 63
End: 2023-02-03

## 2023-02-03 ENCOUNTER — TELEPHONE (OUTPATIENT)
Dept: NEPHROLOGY | Facility: CLINIC | Age: 63
End: 2023-02-03

## 2023-02-03 ENCOUNTER — HOME CARE VISIT (OUTPATIENT)
Dept: HOME HEALTH SERVICES | Facility: HOME HEALTHCARE | Age: 63
End: 2023-02-03

## 2023-02-03 VITALS
TEMPERATURE: 97.4 F | DIASTOLIC BLOOD PRESSURE: 94 MMHG | RESPIRATION RATE: 19 BRPM | SYSTOLIC BLOOD PRESSURE: 124 MMHG | WEIGHT: 242 LBS | HEART RATE: 63 BPM | HEIGHT: 68 IN | OXYGEN SATURATION: 99 % | BODY MASS INDEX: 36.68 KG/M2

## 2023-02-03 DIAGNOSIS — I10 PRIMARY HYPERTENSION: ICD-10-CM

## 2023-02-03 DIAGNOSIS — G89.4 CHRONIC PAIN SYNDROME: ICD-10-CM

## 2023-02-03 DIAGNOSIS — R53.81 MALAISE: Primary | ICD-10-CM

## 2023-02-03 DIAGNOSIS — R39.89 ABNORMAL URINE COLOR: ICD-10-CM

## 2023-02-03 DIAGNOSIS — F11.20 CONTINUOUS OPIOID DEPENDENCE (HCC): ICD-10-CM

## 2023-02-03 DIAGNOSIS — R11.0 NAUSEA: ICD-10-CM

## 2023-02-03 LAB
SL AMB  POCT GLUCOSE, UA: NORMAL
SL AMB LEUKOCYTE ESTERASE,UA: NORMAL
SL AMB POCT BILIRUBIN,UA: NORMAL
SL AMB POCT BLOOD,UA: 250
SL AMB POCT CLARITY,UA: NORMAL
SL AMB POCT COLOR,UA: NORMAL
SL AMB POCT KETONES,UA: NORMAL
SL AMB POCT NITRITE,UA: NORMAL
SL AMB POCT PH,UA: 5
SL AMB POCT SPECIFIC GRAVITY,UA: 1.02
SL AMB POCT URINE PROTEIN: 500
SL AMB POCT UROBILINOGEN: NORMAL

## 2023-02-03 RX ORDER — OXYCODONE HYDROCHLORIDE 5 MG/1
5 TABLET ORAL EVERY 8 HOURS PRN
Qty: 90 TABLET | Refills: 0 | Status: SHIPPED | OUTPATIENT
Start: 2023-02-03

## 2023-02-03 RX ORDER — CIPROFLOXACIN 250 MG/1
250 TABLET, FILM COATED ORAL EVERY 12 HOURS SCHEDULED
Qty: 14 TABLET | Refills: 0 | Status: SHIPPED | OUTPATIENT
Start: 2023-02-03 | End: 2023-02-10

## 2023-02-03 NOTE — PROGRESS NOTES
I received a call from the patient stating she is "not so good" and had to cancel her [facility] placement that was scheduled for today  The patient was tearful throughout the conversation reporting symptoms of nausea with abdominal pain, diarrhea and body aches  She denies fever or vomiting  She did not sleep well because of these symptoms  The patient stated she was able to eat "a little bit" and trying to stay hydrated by drinking water and diet soda  She notes she took her medications today but did not check her blood sugar  The patient notes she has been taking Tylenol without relief and ran out of her other pain meds  I advised the patient to be evaluated at the ED but she did not want to go  I was able to secure a same day appointment this afternoon  I will continue to follow

## 2023-02-03 NOTE — ASSESSMENT & PLAN NOTE
Unknown etiology of increased malaise for past one week  Urine dip in office + Leuk, +blood, +protien  Will treat empirically again for UTI  Send Urine Culture

## 2023-02-03 NOTE — CASE COMMUNICATION
No visit made with pt today as pt had an appt with IR for suprapubic catheter placement  Pt was only seen once this week   Next visit planned for 2 8 23

## 2023-02-03 NOTE — TELEPHONE ENCOUNTER
Appointment Confirmation   Person confirmed appointment with  If not patient, name of the person Patient    Date and time of appointment 2/6/23 1030a    Patient acknowledged and will be at appointment? no    Did you advise the patient that they will need a urine sample if they are a new patient?  N/A    Did you advise the patient to bring their current medications for verification? (including any OTC) No    Additional Information  Visit cancelled due to patient on Dialysis

## 2023-02-04 RX ORDER — HYDRALAZINE HYDROCHLORIDE 25 MG/1
25 TABLET, FILM COATED ORAL 3 TIMES DAILY
Qty: 90 TABLET | Refills: 0 | Status: SHIPPED | OUTPATIENT
Start: 2023-02-04 | End: 2023-02-09 | Stop reason: SDUPTHER

## 2023-02-06 ENCOUNTER — ANESTHESIA (OUTPATIENT)
Dept: PERIOP | Facility: HOSPITAL | Age: 63
End: 2023-02-06

## 2023-02-06 ENCOUNTER — HOSPITAL ENCOUNTER (OUTPATIENT)
Facility: HOSPITAL | Age: 63
Setting detail: OUTPATIENT SURGERY
Discharge: HOME | End: 2023-02-06
Attending: SURGERY | Admitting: SURGERY

## 2023-02-06 ENCOUNTER — PATIENT OUTREACH (OUTPATIENT)
Dept: FAMILY MEDICINE CLINIC | Facility: CLINIC | Age: 63
End: 2023-02-06

## 2023-02-06 VITALS
TEMPERATURE: 97.7 F | HEART RATE: 75 BPM | WEIGHT: 250.22 LBS | DIASTOLIC BLOOD PRESSURE: 58 MMHG | RESPIRATION RATE: 16 BRPM | SYSTOLIC BLOOD PRESSURE: 121 MMHG | OXYGEN SATURATION: 95 % | BODY MASS INDEX: 37.92 KG/M2 | HEIGHT: 68 IN

## 2023-02-06 DIAGNOSIS — N18.6 ESRD (END STAGE RENAL DISEASE) (HCC): Primary | ICD-10-CM

## 2023-02-06 LAB
ANION GAP SERPL CALCULATED.3IONS-SCNC: 8 MMOL/L (ref 4–13)
BUN SERPL-MCNC: 47 MG/DL (ref 5–25)
CALCIUM SERPL-MCNC: 8.5 MG/DL (ref 8.4–10.2)
CHLORIDE SERPL-SCNC: 105 MMOL/L (ref 96–108)
CO2 SERPL-SCNC: 25 MMOL/L (ref 21–32)
CREAT SERPL-MCNC: 3.28 MG/DL (ref 0.6–1.3)
GFR SERPL CREATININE-BSD FRML MDRD: 14 ML/MIN/1.73SQ M
GLUCOSE P FAST SERPL-MCNC: 105 MG/DL (ref 65–99)
GLUCOSE SERPL-MCNC: 105 MG/DL (ref 65–140)
GLUCOSE SERPL-MCNC: 88 MG/DL (ref 65–140)
GLUCOSE SERPL-MCNC: 99 MG/DL (ref 65–140)
POTASSIUM SERPL-SCNC: 5 MMOL/L (ref 3.5–5.3)
SODIUM SERPL-SCNC: 138 MMOL/L (ref 135–147)

## 2023-02-06 RX ORDER — CHLORHEXIDINE GLUCONATE 0.12 MG/ML
15 RINSE ORAL ONCE
Status: COMPLETED | OUTPATIENT
Start: 2023-02-06 | End: 2023-02-06

## 2023-02-06 RX ORDER — SODIUM CHLORIDE 9 MG/ML
50 INJECTION, SOLUTION INTRAVENOUS CONTINUOUS
Status: DISCONTINUED | OUTPATIENT
Start: 2023-02-06 | End: 2023-02-06 | Stop reason: HOSPADM

## 2023-02-06 RX ORDER — LIDOCAINE HYDROCHLORIDE 10 MG/ML
INJECTION, SOLUTION EPIDURAL; INFILTRATION; INTRACAUDAL; PERINEURAL AS NEEDED
Status: DISCONTINUED | OUTPATIENT
Start: 2023-02-06 | End: 2023-02-06 | Stop reason: HOSPADM

## 2023-02-06 RX ORDER — ONDANSETRON 2 MG/ML
INJECTION INTRAMUSCULAR; INTRAVENOUS AS NEEDED
Status: DISCONTINUED | OUTPATIENT
Start: 2023-02-06 | End: 2023-02-06

## 2023-02-06 RX ORDER — SUCCINYLCHOLINE/SOD CL,ISO/PF 100 MG/5ML
SYRINGE (ML) INTRAVENOUS AS NEEDED
Status: DISCONTINUED | OUTPATIENT
Start: 2023-02-06 | End: 2023-02-06

## 2023-02-06 RX ORDER — NEOSTIGMINE METHYLSULFATE 1 MG/ML
INJECTION INTRAVENOUS AS NEEDED
Status: DISCONTINUED | OUTPATIENT
Start: 2023-02-06 | End: 2023-02-06

## 2023-02-06 RX ORDER — CHLORHEXIDINE GLUCONATE 0.12 MG/ML
15 RINSE ORAL EVERY 12 HOURS SCHEDULED
Status: DISCONTINUED | OUTPATIENT
Start: 2023-02-06 | End: 2023-02-06 | Stop reason: HOSPADM

## 2023-02-06 RX ORDER — OXYCODONE HYDROCHLORIDE AND ACETAMINOPHEN 5; 325 MG/1; MG/1
1 TABLET ORAL EVERY 4 HOURS PRN
Status: DISCONTINUED | OUTPATIENT
Start: 2023-02-06 | End: 2023-02-06 | Stop reason: HOSPADM

## 2023-02-06 RX ORDER — HEPARIN SODIUM 1000 [USP'U]/ML
INJECTION, SOLUTION INTRAVENOUS; SUBCUTANEOUS AS NEEDED
Status: DISCONTINUED | OUTPATIENT
Start: 2023-02-06 | End: 2023-02-06

## 2023-02-06 RX ORDER — EPHEDRINE SULFATE 50 MG/ML
INJECTION INTRAVENOUS AS NEEDED
Status: DISCONTINUED | OUTPATIENT
Start: 2023-02-06 | End: 2023-02-06

## 2023-02-06 RX ORDER — PROPOFOL 10 MG/ML
INJECTION, EMULSION INTRAVENOUS AS NEEDED
Status: DISCONTINUED | OUTPATIENT
Start: 2023-02-06 | End: 2023-02-06

## 2023-02-06 RX ORDER — SODIUM CHLORIDE 9 MG/ML
125 INJECTION, SOLUTION INTRAVENOUS CONTINUOUS
Status: DISCONTINUED | OUTPATIENT
Start: 2023-02-06 | End: 2023-02-06

## 2023-02-06 RX ORDER — ONDANSETRON 2 MG/ML
4 INJECTION INTRAMUSCULAR; INTRAVENOUS EVERY 6 HOURS PRN
Status: DISCONTINUED | OUTPATIENT
Start: 2023-02-06 | End: 2023-02-06 | Stop reason: HOSPADM

## 2023-02-06 RX ORDER — PROMETHAZINE HYDROCHLORIDE 25 MG/ML
6.25 INJECTION, SOLUTION INTRAMUSCULAR; INTRAVENOUS ONCE AS NEEDED
Status: COMPLETED | OUTPATIENT
Start: 2023-02-06 | End: 2023-02-06

## 2023-02-06 RX ORDER — FENTANYL CITRATE 50 UG/ML
INJECTION, SOLUTION INTRAMUSCULAR; INTRAVENOUS AS NEEDED
Status: DISCONTINUED | OUTPATIENT
Start: 2023-02-06 | End: 2023-02-06

## 2023-02-06 RX ORDER — ROCURONIUM BROMIDE 10 MG/ML
INJECTION, SOLUTION INTRAVENOUS AS NEEDED
Status: DISCONTINUED | OUTPATIENT
Start: 2023-02-06 | End: 2023-02-06

## 2023-02-06 RX ORDER — OXYCODONE HYDROCHLORIDE AND ACETAMINOPHEN 5; 325 MG/1; MG/1
1 TABLET ORAL EVERY 6 HOURS PRN
Qty: 20 TABLET | Refills: 0 | Status: SHIPPED | OUTPATIENT
Start: 2023-02-06 | End: 2023-02-16

## 2023-02-06 RX ORDER — LIDOCAINE HYDROCHLORIDE 20 MG/ML
INJECTION, SOLUTION EPIDURAL; INFILTRATION; INTRACAUDAL; PERINEURAL AS NEEDED
Status: DISCONTINUED | OUTPATIENT
Start: 2023-02-06 | End: 2023-02-06

## 2023-02-06 RX ORDER — MIDAZOLAM HYDROCHLORIDE 2 MG/2ML
INJECTION, SOLUTION INTRAMUSCULAR; INTRAVENOUS AS NEEDED
Status: DISCONTINUED | OUTPATIENT
Start: 2023-02-06 | End: 2023-02-06

## 2023-02-06 RX ORDER — CEFAZOLIN SODIUM 2 G/50ML
2000 SOLUTION INTRAVENOUS ONCE
Status: COMPLETED | OUTPATIENT
Start: 2023-02-06 | End: 2023-02-06

## 2023-02-06 RX ORDER — MAGNESIUM HYDROXIDE 1200 MG/15ML
LIQUID ORAL AS NEEDED
Status: DISCONTINUED | OUTPATIENT
Start: 2023-02-06 | End: 2023-02-06 | Stop reason: HOSPADM

## 2023-02-06 RX ORDER — GLYCOPYRROLATE 0.2 MG/ML
INJECTION INTRAMUSCULAR; INTRAVENOUS AS NEEDED
Status: DISCONTINUED | OUTPATIENT
Start: 2023-02-06 | End: 2023-02-06

## 2023-02-06 RX ADMIN — MIDAZOLAM 2 MG: 1 INJECTION INTRAMUSCULAR; INTRAVENOUS at 10:42

## 2023-02-06 RX ADMIN — SODIUM CHLORIDE 50 ML/HR: 0.9 INJECTION, SOLUTION INTRAVENOUS at 09:54

## 2023-02-06 RX ADMIN — Medication 100 MG: at 10:53

## 2023-02-06 RX ADMIN — HEPARIN SODIUM 5000 UNITS: 1000 INJECTION INTRAVENOUS; SUBCUTANEOUS at 11:34

## 2023-02-06 RX ADMIN — PROPOFOL 180 MG: 10 INJECTION, EMULSION INTRAVENOUS at 10:53

## 2023-02-06 RX ADMIN — GLYCOPYRROLATE 0.6 MG: 0.2 INJECTION INTRAMUSCULAR; INTRAVENOUS at 12:19

## 2023-02-06 RX ADMIN — FENTANYL CITRATE 50 MCG: 50 INJECTION INTRAMUSCULAR; INTRAVENOUS at 11:22

## 2023-02-06 RX ADMIN — CHLORHEXIDINE GLUCONATE 0.12% ORAL RINSE 15 ML: 1.2 LIQUID ORAL at 09:53

## 2023-02-06 RX ADMIN — ROCURONIUM BROMIDE 5 MG: 10 INJECTION, SOLUTION INTRAVENOUS at 10:53

## 2023-02-06 RX ADMIN — EPHEDRINE SULFATE 10 MG: 50 INJECTION, SOLUTION INTRAVENOUS at 11:43

## 2023-02-06 RX ADMIN — FENTANYL CITRATE 100 MCG: 50 INJECTION INTRAMUSCULAR; INTRAVENOUS at 10:53

## 2023-02-06 RX ADMIN — ROCURONIUM BROMIDE 30 MG: 10 INJECTION, SOLUTION INTRAVENOUS at 11:00

## 2023-02-06 RX ADMIN — PROPOFOL 20 MG: 10 INJECTION, EMULSION INTRAVENOUS at 11:59

## 2023-02-06 RX ADMIN — SODIUM CHLORIDE 50 ML/HR: 0.9 INJECTION, SOLUTION INTRAVENOUS at 13:26

## 2023-02-06 RX ADMIN — FENTANYL CITRATE 50 MCG: 50 INJECTION INTRAMUSCULAR; INTRAVENOUS at 11:59

## 2023-02-06 RX ADMIN — NEOSTIGMINE METHYLSULFATE 3.5 MG: 1 INJECTION INTRAVENOUS at 12:19

## 2023-02-06 RX ADMIN — ONDANSETRON 4 MG: 2 INJECTION INTRAMUSCULAR; INTRAVENOUS at 12:19

## 2023-02-06 RX ADMIN — ROCURONIUM BROMIDE 10 MG: 10 INJECTION, SOLUTION INTRAVENOUS at 11:59

## 2023-02-06 RX ADMIN — CEFAZOLIN SODIUM 2000 MG: 2 SOLUTION INTRAVENOUS at 10:37

## 2023-02-06 RX ADMIN — PROMETHAZINE HYDROCHLORIDE 6.25 MG: 25 INJECTION INTRAMUSCULAR; INTRAVENOUS at 13:21

## 2023-02-06 RX ADMIN — EPHEDRINE SULFATE 10 MG: 50 INJECTION, SOLUTION INTRAVENOUS at 12:03

## 2023-02-06 RX ADMIN — ONDANSETRON HYDROCHLORIDE 4 MG: 2 SOLUTION INTRAMUSCULAR; INTRAVENOUS at 12:56

## 2023-02-06 RX ADMIN — LIDOCAINE HYDROCHLORIDE 80 MG: 20 INJECTION, SOLUTION EPIDURAL; INFILTRATION; INTRACAUDAL; PERINEURAL at 10:53

## 2023-02-06 NOTE — PROGRESS NOTES
Ashly / Isidra García:  2/6/2023    [facility] received a call from pt's daughter, Nicole Tiwari and [place]  She requested an update on waiver status  [address] [place] [address] informed she did leave a message for Tere Roblero at [facility] to request an appeal and has not heard back from her  [facility] did facilitate a three way call to [facility] and left another detailed voice message requesting a call from Tere Ramon thanked [address] [place] [address] for her support and is aware [address] [place] [address] will call her with any updates  Next outreach is scheduled for 2/9/2023

## 2023-02-06 NOTE — ANESTHESIA POSTPROCEDURE EVALUATION
Post-Op Assessment Note    CV Status:  Stable    Pain management: adequate     Mental Status:  Alert and awake   Hydration Status:  Euvolemic   PONV Controlled:  Controlled   Airway Patency:  Patent      Post Op Vitals Reviewed: Yes      Staff: Anesthesiologist, CRNA         No notable events documented      BP      Temp     Pulse     Resp      SpO2      /56   Pulse 73   Temp (!) 97 3 °F (36 3 °C) (Temporal)   Resp 16   Ht 5' 8" (1 727 m)   Wt 113 kg (250 lb 3 6 oz)   SpO2 97%   BMI 38 05 kg/m²

## 2023-02-06 NOTE — ANESTHESIA PREPROCEDURE EVALUATION
Procedure:  CREATION FISTULA ARTERIOVENOUS (AV), psb graft (Left: Arm Upper)    Relevant Problems   CARDIO   (+) CAD (coronary artery disease)   (+) Chest pain   (+) Chronic combined systolic and diastolic congestive heart failure (HCC)   (+) HTN (hypertension)   (+) Mixed hyperlipidemia   (+) Stable angina (HCC)      ENDO   (+) Acquired hypothyroidism   (+) Type 2 diabetes mellitus with chronic kidney disease on chronic dialysis, with long-term current use of insulin (HCC)      GI/HEPATIC   (+) Gastroesophageal reflux disease without esophagitis      /RENAL   (+) Dependence on renal dialysis (HCC)   (+) ESRD (end stage renal disease) (HCC)      HEMATOLOGY   (+) Anemia      NEURO/PSYCH   (+) Continuous opioid dependence (HCC)   (+) Major depressive disorder   (+) Severe episode of recurrent major depressive disorder, without psychotic features ([facility])   (+) Stable angina (HCC)             Anesthesia Plan  ASA Score- 3     Anesthesia Type- general with ASA Monitors  Additional Monitors:   Airway Plan: ETT  Comment: 2D Echo 6/2022    Interpretation Summary       Limited transthoracic echocardiography performed  Patient had complete echocardiogram on June 22, 2022:     1  Normal left ventricular size, moderate concentric left ventricular hypertrophy, moderately reduced left ventricular systolic function with marked regional wall motion abnormalities as described below  Ejection fraction is estimated as around 38%  2  Normal right ventricular size and systolic function  3  Mild left atrial cavity enlargement  4  Aortic valve sclerosis, trace aortic valve regurgitation  5  Mitral valve sclerosis with some focal calcification, mild mitral valve regurgitation  6  Mild tricuspid and trace pulmonic valve regurgitation  7  No obvious pulmonary hypertension    8  Varying degrees of trace to small circumferential pericardial effusion without echocardiographic evidence of cardiac tamponade              Plan Factors-Exercise tolerance (METS): >4 METS  Chart reviewed  EKG reviewed  Existing labs reviewed  Patient summary reviewed  Patient is not a current smoker  Induction- intravenous  Postoperative Plan- Plan for postoperative opioid use  Informed Consent- Anesthetic plan and risks discussed with patient

## 2023-02-06 NOTE — OP NOTE
OPERATIVE REPORT  PATIENT NAME: Omkar Meier    :  1960  MRN: [id number]  Pt Location: [place] [address]    SURGERY DATE: 2023    Surgeon(s) and Role:     * Leila Ross, DO - Primary     * Hali Way MD - Assisting    Preop Diagnosis:  ESRD (end stage renal disease) ([facility] ) [N18 6]    Post-Op Diagnosis Codes:     * ESRD (end stage renal disease) ([facility] [address] ) [N18 6]    Procedure(s):  Left - CREATION FISTULA  ARTERIOVENOUS (AV)    Specimen(s):  * No specimens in log *    Estimated Blood Loss:   Minimal    Drains:  Urethral Catheter 16 Fr  (Active)   Number of days: 14       Suprapubic Catheter 16 Fr  (Active)   Number of days: 488       Anesthesia Type:   General    Operative Indications:  ESRD (end stage renal disease) ([facility] ) [N18 6]  Patient is a 63-year-old woman with end-stage renal disease currently dialyzing through a right chest wall catheter who was referred to the office for evaluation of primary dialysis access  She underwent vein mapping which suggest a suitable cephalic vein for fistula creation  The procedure, risk, benefits, alternatives, and anticipated postop course were discussed in detail  Patient was agreeable to proceed  Written consent was obtained  Patient was brought to the operating room for the above-mentioned procedure  Operative Findings:  Brachial artery approximately 6 mm in diameter  Cephalic vein approximately 4 mm in diameter  Good thrill at conclusion of case  Complications:   None    Procedure and Technique:  The patient was probably identified in the preop holding area  The patient's name, laterality, and age procedure verified  The operative site was marked patient was brought to the operating room where she was positioned supine on the OR table with the left upper extremity in 9 degrees abduction on an arm table    After adequate duction of general anesthesia the left upper extremity was circumferentially prepped using chlorhexidine and draped in usual sterile fashion  A preoperative dose of antibiotics was administered  A formal timeout was performed and all were in agreement  A transverse skin incision was carried out approximate 1 fingerbreadth below the left antecubital crease  The incision was deepened down through the subcutaneous tissue using electrocautery  Dissection was carried down onto the cephalic vein which was dissected free from the surrounding tissue  There were several branches which were divided between silk ties  Through the same incision the bicipital aponeurosis was partially divided  Dissection was carried down onto the easily palpable brachial artery  The brachial artery was eventually dissected free from its surrounding tissue and encircled with Vesseloops proximally and distally  5000 units of IV heparin was given  After appropriate circulation time the vein was ligated distally and transected  The brachial artery was controlled using the Vesseloops  An arteriotomy was carried out using normal limits Extended with [facility]  A stay suture was Placed to facilitate Exposure of the Arteriotomy Site  The Vein Was Cut to the appropriate Length and Spatulated  An end to side Anastomosis was fashioned Using a 6-0 Prolene Suture  Prior to completing the anastomosis the vessels were backbled  The anastomosis was completed and suture line secured  The Vesseloops were released initiating flow through the fistula  The anastomosis was noted to be hemostatic  There was an easily appreciable thrill  The wound was irrigated  The incision was closed in a multilayered fashion  All needles, instruments, sponge counts were reported correct  The patient tolerated procedure well  She was awakened, expanded transferred to recovery room in stable condition     I was present for the entire procedure    Patient Disposition:  PACU         SIGNATURE: Reginald Brittle, DO  DATE: February 6, 2023  TIME: 12:17 PM       Vascular Quality Initiative - Hemodialysis Access Placement    Pre-admission Information   Functional status: Restricted in physically strenuous activity but ambulatory and able to carry out work of a light or sedentary nature  ESRD: ESRD: Hemodialysis dependent  Current Access Type : Tunneled Catheter    Historical Information      Previous Access: none    Access Type/Location:         Procedure Information      Status: Outpatient     Side:left      Anesthesia: General     Access Type: Access Type: Surgical AVF Inflow Artery is: Brachial, Antecubital Intraoperative Artery taget diameter is: 6mm  Outflow Vein is: Cephalic, Forearm  Intraoperative Vein target diameter is: 4mm  Anastomosis configuration is: End to Side    Cocomitant Procedure performed-: None    Completion Fistulogram: no     Preop ARTERIAL evaluation and/or treatment:     Preop VENOUS evaluation and/or treatment: ultrasound mapping    *Obtain Target Diameters from study          Post op Information     Discharge Status: Home    Post op Complications: None

## 2023-02-07 ENCOUNTER — PATIENT OUTREACH (OUTPATIENT)
Dept: FAMILY MEDICINE CLINIC | Facility: CLINIC | Age: 63
End: 2023-02-07

## 2023-02-07 NOTE — PROGRESS NOTES
Incoming / Dayan Burns Calls:  2/7/2023    CMOC received a call from Sb Amezcua, Supervisor at [facility]  She reviewed pt's evaluation for waiver services with [facility] and agreed it would be a good idea to reassess pt  Denia stated that they only review ADL's for three days prior to assessment and not life long issues  She stated that pt and daughter both stated that pt is able to complete ADL's on her own and this would make her ineligible for services  CMOC expressed understanding  Denia asked if [facility] could forward copy of Physician Certification form to her and [facility] agreed to fax to her tomorrow  She stated that she will have someone reach out to pt and schedule a new assessment  CMOC called pt and updated her on this  Pt was at dialysis at the time of call  Pt was thankful for being able to have another assessment  [facility] informed pt she will need to be sure she mentions daily struggle with ADL's  [facility] did call pt's daughter Nicole Tiwari and [place] and informed her of new assessment to take place  Yenny stated that she really needs this job (taking care of pt) as she is financially struggling and wants to gather money to make a trip to [facility] informed she will need to focus on pt's needs for this assessment as they are looking to see if pt is in need of services and not concerned about providing employment for her  She expressed understanding  [facility] informed if pt is denied again she will need to wait one year to reapply  Yenny expressed understanding  Next outreach is scheduled for 2/8/2023

## 2023-02-08 ENCOUNTER — TELEPHONE (OUTPATIENT)
Dept: RADIOLOGY | Facility: HOSPITAL | Age: 63
End: 2023-02-08

## 2023-02-08 ENCOUNTER — PATIENT OUTREACH (OUTPATIENT)
Dept: FAMILY MEDICINE CLINIC | Facility: CLINIC | Age: 63
End: 2023-02-08

## 2023-02-08 ENCOUNTER — TELEPHONE (OUTPATIENT)
Dept: FAMILY MEDICINE CLINIC | Facility: CLINIC | Age: 63
End: 2023-02-08

## 2023-02-08 ENCOUNTER — HOME CARE VISIT (OUTPATIENT)
Dept: HOME HEALTH SERVICES | Facility: HOME HEALTHCARE | Age: 63
End: 2023-02-08

## 2023-02-08 ENCOUNTER — TELEPHONE (OUTPATIENT)
Dept: VASCULAR SURGERY | Facility: CLINIC | Age: 63
End: 2023-02-08

## 2023-02-08 VITALS
HEART RATE: 70 BPM | DIASTOLIC BLOOD PRESSURE: 76 MMHG | OXYGEN SATURATION: 99 % | RESPIRATION RATE: 18 BRPM | SYSTOLIC BLOOD PRESSURE: 140 MMHG | TEMPERATURE: 96.8 F

## 2023-02-08 NOTE — TELEPHONE ENCOUNTER
Hi, my name is Christy Tolentino  I'm calling with the [facility]  I am currently with Danisha Macedo, [facility]  W 's date of birth is [birth year] [de-identified], [de-identified] two  I am trying to get her set up with blister packs from [facility]  And just for like better Med compliance because she's very unorganized with her medications in the home  And I had talked to them, they are requesting that we send a prescription for all of her medications, like a new prescription to their pharmacy so that they can get the patient set up with a refill can like at the same time for each medication so that they can sell blister packs for her on a weekly basis  If you can, that would be sent to [address] [place] [address] in [facility] [address] [facility] for all of her medications  And you can give me a call back, my number is four, eight, four, eight, nine, six, zero, seven, two, two  Thank you

## 2023-02-08 NOTE — CASE COMMUNICATION
I did a home visit with the pt and her daughter today  I am trying to get the pt set up with [address] for medication blister packs for better management of the pt's medications as she often has medications scattered throughout her room and is unsure if she is taking medications appropriately  The pharmacy is requesting that you send in a new script for all of her current medications so that they can get pt started  on blister packs as soon as possible  Thank you

## 2023-02-08 NOTE — TELEPHONE ENCOUNTER
Vascular Nurse Navigator Post Op Call    Procedure: Left - CREATION FISTULA  ARTERIOVENOUS (AV)    Date of Procedure:  2/6/23    Surgeon:    Naren Ascencio DO - Primary     * Noah Whyte MD - Assisting      Painful tingling or numbness in your fingers?: No    Paleness/Coolness in hands/fingers?: No    Redness, swelling or pus from your wound?: No    Bleeding?: No    Thrill present?: Yes    Anticoagulation pt was discharged on post op?: Aspirin and Clopidogrel (Plavix)    Statin pt was discharged on post op?:  Lipitor (atorvastatin)    Fever/chills?: No    Uncontrolled Pain?: No      Reviewed discharge instructions and incision care with patient  Dialysis Days and Location:  [facility] at [address] [place] [address] OFFICE VISIT:  2/22/23 at 11:30 am with Dr Yoselin Jasso at The [address] [place] [address] Road    Transportation Confirmed?: Yes      Any Questions or Concerns? Patient stated that she is doing good since procedure  She stated that she has occasional tingling in her hand/fingers, but it goes away with movement  Reviewed incision care with her - wash daily with soap and water  Reviewed discharge medications - Aspirin, Plavix, and Lipitor  All questions answered  No concerns expressed at this time

## 2023-02-08 NOTE — PROGRESS NOTES
Fax:  2/8/2023    [facility] did fax updated PA IEB form to [de-identified] M at [facility] as requested so that pt can be reassessed for waiver services  Fax confirmation received  Copy of forms provided to  to be scanned to pt's chart  Next outreach is scheduled for 2/15/2023

## 2023-02-08 NOTE — PRE-PROCEDURE INSTRUCTIONS
Pre-procedure Instructions for Interventional Radiology  [address]  [place] [address] [address] [phone]    You are scheduled for a/an Suprapubic Catheter Placement  On Tuesday 2/14/23  Your tentative arrival time is 0715  Short stay will notify you the day before your procedure with the exact arrival time and the location to arrive  To prepare for your procedure:  1  Please arrange for someone to drive you home after the procedure and stay with you until the next morning if you are instructed to do so  This is typically for patients receiving some type of sedative or anesthetic for the procedure  2  DO NOT EAT OR DRINK ANYTHING after midnight on the evening before your procedure including candy & gum   3  ONLY SIPS OF WATER with your medications are allowed on the morning of your procedure  4  TAKE ALL OF YOUR REGULAR MEDICATIONS THE MORNING OF YOUR PROCEDURE with sips of water! We may call you to stop some of your blood sugar, blood pressure and blood thinning medications depending on the procedure  Please take all of these medications unless we instruct you to stop them  5  If you have an allergy to x-ray dye, please contact Interventional Radiology for an x-ray dye preparation which usually consists of an oral steroid and Benadryl  The day of your procedure:  1  Bring a list of the medications you take at home  2  Bring medications you take for breathing problems (such as inhalers), medications for chest pain, or both  3  Bring a case for your glasses or contacts  4  Bring your insurance card and a form of photo ID   5  Please leave all valuables such as credit cards and jewelry at home  6  Report to the registration desk in the main lobby at the [facility]  Ask to be directed to [facility]    7  While your procedure is being performed, your family may wait in the Radiology Waiting Room on the 1st floor in Radiology  if they need to leave, they may provide a number to be called following the procedure  8  Be prepared to stay overnight just in case  Sometimes procedures will indicate the need for further observation or treatment  9  If you are scheduled for a follow-up visit with the Interventional Radiologist after your procedure, you will be called with a date and time  Special Instructions (Medications to stop taking before your procedure etc ):  ASA and Plavix LD 2/8/23 and restart 2/15/23; Hold AM Humalog insulin AM 2/14/23  Above reviewed with the patient and her daughter [place] and [place]

## 2023-02-08 NOTE — PROGRESS NOTES
I called the patient to follow up after her recent surgery for creation of AV fistula  She states the area is tender and slightly swollen but denies any signs of infection  The patient states she is fatigued today but did not check her blood sugar  She states her daughter has been checking her blood pressure and giving hydralazine if needed  VNA is expected at the patient's home in a few hours  The patient will call with questions or concerns  I will continue to follow  Chart reviewed

## 2023-02-09 DIAGNOSIS — Z79.4 CURRENT USE OF INSULIN (HCC): ICD-10-CM

## 2023-02-09 DIAGNOSIS — Z79.4 TYPE 2 DIABETES MELLITUS WITH FOOT ULCER, WITH LONG-TERM CURRENT USE OF INSULIN (HCC): ICD-10-CM

## 2023-02-09 DIAGNOSIS — E53.8 VITAMIN B12 DEFICIENCY: ICD-10-CM

## 2023-02-09 DIAGNOSIS — E11.42 TYPE 2 DIABETES MELLITUS WITH DIABETIC POLYNEUROPATHY, WITH LONG-TERM CURRENT USE OF INSULIN (HCC): ICD-10-CM

## 2023-02-09 DIAGNOSIS — I10 PRIMARY HYPERTENSION: ICD-10-CM

## 2023-02-09 DIAGNOSIS — K21.9 GASTROESOPHAGEAL REFLUX DISEASE WITHOUT ESOPHAGITIS: ICD-10-CM

## 2023-02-09 DIAGNOSIS — N18.32 STAGE 3B CHRONIC KIDNEY DISEASE (HCC): ICD-10-CM

## 2023-02-09 DIAGNOSIS — I25.10 CORONARY ARTERY DISEASE INVOLVING NATIVE HEART WITHOUT ANGINA PECTORIS, UNSPECIFIED VESSEL OR LESION TYPE: ICD-10-CM

## 2023-02-09 DIAGNOSIS — Z79.4 TYPE 2 DIABETES MELLITUS WITH DIABETIC POLYNEUROPATHY, WITH LONG-TERM CURRENT USE OF INSULIN (HCC): ICD-10-CM

## 2023-02-09 DIAGNOSIS — M10.9 GOUT, UNSPECIFIED CAUSE, UNSPECIFIED CHRONICITY, UNSPECIFIED SITE: ICD-10-CM

## 2023-02-09 DIAGNOSIS — K59.00 CONSTIPATION, UNSPECIFIED CONSTIPATION TYPE: ICD-10-CM

## 2023-02-09 DIAGNOSIS — E11.621 TYPE 2 DIABETES MELLITUS WITH FOOT ULCER, WITH LONG-TERM CURRENT USE OF INSULIN (HCC): ICD-10-CM

## 2023-02-09 DIAGNOSIS — E03.9 HYPOTHYROIDISM, UNSPECIFIED TYPE: ICD-10-CM

## 2023-02-09 DIAGNOSIS — L97.509 TYPE 2 DIABETES MELLITUS WITH FOOT ULCER, WITH LONG-TERM CURRENT USE OF INSULIN (HCC): ICD-10-CM

## 2023-02-09 DIAGNOSIS — Z98.1 S/P CERVICAL SPINAL FUSION: ICD-10-CM

## 2023-02-09 DIAGNOSIS — I10 HYPERTENSION, UNSPECIFIED TYPE: ICD-10-CM

## 2023-02-09 DIAGNOSIS — F51.05 INSOMNIA SECONDARY TO ANXIETY: ICD-10-CM

## 2023-02-09 DIAGNOSIS — F41.9 INSOMNIA SECONDARY TO ANXIETY: ICD-10-CM

## 2023-02-09 RX ORDER — LOSARTAN POTASSIUM 25 MG/1
25 TABLET ORAL DAILY
Qty: 90 TABLET | Refills: 1 | Status: SHIPPED | OUTPATIENT
Start: 2023-02-09

## 2023-02-09 RX ORDER — PANTOPRAZOLE SODIUM 40 MG/1
40 TABLET, DELAYED RELEASE ORAL DAILY
Qty: 180 TABLET | Refills: 1 | Status: SHIPPED | OUTPATIENT
Start: 2023-02-09

## 2023-02-09 RX ORDER — CITALOPRAM 20 MG/1
20 TABLET ORAL DAILY
Qty: 90 TABLET | Refills: 1 | Status: SHIPPED | OUTPATIENT
Start: 2023-02-09 | End: 2023-07-22

## 2023-02-09 RX ORDER — ISOSORBIDE MONONITRATE 30 MG/1
30 TABLET, EXTENDED RELEASE ORAL EVERY EVENING
Qty: 30 TABLET | Refills: 0 | Status: SHIPPED | OUTPATIENT
Start: 2023-02-09 | End: 2023-03-13

## 2023-02-09 RX ORDER — ALLOPURINOL 300 MG/1
300 TABLET ORAL DAILY
Qty: 90 TABLET | Refills: 1 | Status: SHIPPED | OUTPATIENT
Start: 2023-02-09 | End: 2023-04-05

## 2023-02-09 RX ORDER — DULAGLUTIDE 1.5 MG/.5ML
0.5 INJECTION, SOLUTION SUBCUTANEOUS WEEKLY
Qty: 6 ML | Refills: 1 | Status: SHIPPED | OUTPATIENT
Start: 2023-02-09 | End: 2023-03-24 | Stop reason: SDUPTHER

## 2023-02-09 RX ORDER — ASPIRIN 81 MG/1
81 TABLET ORAL DAILY
Qty: 90 TABLET | Refills: 1 | Status: SHIPPED | OUTPATIENT
Start: 2023-02-09 | End: 2023-08-01

## 2023-02-09 RX ORDER — CYANOCOBALAMIN
1000 KIT
Qty: 6 ML | Refills: 0 | Status: SHIPPED | OUTPATIENT
Start: 2023-02-09 | End: 2023-04-05

## 2023-02-09 RX ORDER — ATORVASTATIN CALCIUM 40 MG/1
40 TABLET, FILM COATED ORAL DAILY
Qty: 90 TABLET | Refills: 1 | Status: SHIPPED | OUTPATIENT
Start: 2023-02-09 | End: 2023-08-29

## 2023-02-09 RX ORDER — CARVEDILOL 25 MG/1
25 TABLET ORAL 2 TIMES DAILY WITH MEALS
Qty: 180 TABLET | Refills: 2 | Status: SHIPPED | OUTPATIENT
Start: 2023-02-09

## 2023-02-09 RX ORDER — INSULIN GLARGINE 100 [IU]/ML
55 INJECTION, SOLUTION SUBCUTANEOUS DAILY
Qty: 20 ML | Refills: 0 | Status: SHIPPED | OUTPATIENT
Start: 2023-02-09 | End: 2023-05-25

## 2023-02-09 RX ORDER — NITROGLYCERIN 0.4 MG/1
0.4 TABLET SUBLINGUAL
Qty: 25 TABLET | Refills: 1 | Status: SHIPPED | OUTPATIENT
Start: 2023-02-09

## 2023-02-09 RX ORDER — HYDRALAZINE HYDROCHLORIDE 25 MG/1
25 TABLET, FILM COATED ORAL 3 TIMES DAILY
Qty: 90 TABLET | Refills: 0 | Status: SHIPPED | OUTPATIENT
Start: 2023-02-09 | End: 2023-03-13

## 2023-02-09 RX ORDER — CLOPIDOGREL BISULFATE 75 MG/1
75 TABLET ORAL DAILY
Qty: 90 TABLET | Refills: 1 | Status: SHIPPED | OUTPATIENT
Start: 2023-02-09 | End: 2023-07-22

## 2023-02-09 RX ORDER — LEVOTHYROXINE SODIUM 0.05 MG/1
50 TABLET ORAL DAILY
Qty: 90 TABLET | Refills: 0 | Status: SHIPPED | OUTPATIENT
Start: 2023-02-09 | End: 2023-05-16

## 2023-02-09 RX ORDER — OLANZAPINE 5 MG/1
5 TABLET ORAL
Qty: 90 TABLET | Refills: 0 | Status: SHIPPED | OUTPATIENT
Start: 2023-02-09 | End: 2023-05-08

## 2023-02-13 ENCOUNTER — ANESTHESIA EVENT (OUTPATIENT)
Dept: ANESTHESIOLOGY | Facility: HOSPITAL | Age: 63
End: 2023-02-13

## 2023-02-13 ENCOUNTER — ANESTHESIA (OUTPATIENT)
Dept: ANESTHESIOLOGY | Facility: HOSPITAL | Age: 63
End: 2023-02-13

## 2023-02-14 ENCOUNTER — ANESTHESIA EVENT (OUTPATIENT)
Dept: RADIOLOGY | Facility: HOSPITAL | Age: 63
End: 2023-02-14

## 2023-02-14 ENCOUNTER — ANESTHESIA (OUTPATIENT)
Dept: RADIOLOGY | Facility: HOSPITAL | Age: 63
End: 2023-02-14

## 2023-02-14 ENCOUNTER — HOSPITAL ENCOUNTER (OUTPATIENT)
Dept: RADIOLOGY | Facility: HOSPITAL | Age: 63
Discharge: HOME | End: 2023-02-14
Attending: RADIOLOGY | Admitting: INTERNAL MEDICINE

## 2023-02-14 VITALS
HEIGHT: 68 IN | SYSTOLIC BLOOD PRESSURE: 152 MMHG | TEMPERATURE: 97.8 F | HEART RATE: 72 BPM | RESPIRATION RATE: 18 BRPM | WEIGHT: 237.8 LBS | BODY MASS INDEX: 36.04 KG/M2 | DIASTOLIC BLOOD PRESSURE: 87 MMHG | OXYGEN SATURATION: 95 %

## 2023-02-14 DIAGNOSIS — R33.9 URINARY RETENTION: ICD-10-CM

## 2023-02-14 LAB
INR PPP: 0.91 (ref 0.84–1.19)
PROTHROMBIN TIME: 12.5 SECONDS (ref 11.6–14.5)

## 2023-02-14 RX ORDER — LIDOCAINE HYDROCHLORIDE 20 MG/ML
JELLY TOPICAL AS NEEDED
Status: COMPLETED | OUTPATIENT
Start: 2023-02-14 | End: 2023-02-14

## 2023-02-14 RX ORDER — SODIUM CHLORIDE 9 MG/ML
125 INJECTION, SOLUTION INTRAVENOUS CONTINUOUS
Status: DISCONTINUED | OUTPATIENT
Start: 2023-02-14 | End: 2023-02-15 | Stop reason: HOSPADM

## 2023-02-14 RX ORDER — CEFAZOLIN SODIUM 2 G/50ML
2000 SOLUTION INTRAVENOUS ONCE
Status: COMPLETED | OUTPATIENT
Start: 2023-02-14 | End: 2023-02-14

## 2023-02-14 RX ORDER — PROPOFOL 10 MG/ML
INJECTION, EMULSION INTRAVENOUS CONTINUOUS PRN
Status: DISCONTINUED | OUTPATIENT
Start: 2023-02-14 | End: 2023-02-14

## 2023-02-14 RX ORDER — ONDANSETRON 2 MG/ML
INJECTION INTRAMUSCULAR; INTRAVENOUS AS NEEDED
Status: DISCONTINUED | OUTPATIENT
Start: 2023-02-14 | End: 2023-02-14

## 2023-02-14 RX ORDER — LIDOCAINE HYDROCHLORIDE 10 MG/ML
INJECTION, SOLUTION EPIDURAL; INFILTRATION; INTRACAUDAL; PERINEURAL AS NEEDED
Status: COMPLETED | OUTPATIENT
Start: 2023-02-14 | End: 2023-02-14

## 2023-02-14 RX ORDER — SODIUM CHLORIDE 9 MG/ML
INJECTION, SOLUTION INTRAVENOUS CONTINUOUS PRN
Status: DISCONTINUED | OUTPATIENT
Start: 2023-02-14 | End: 2023-02-14

## 2023-02-14 RX ORDER — SODIUM CHLORIDE 9 MG/ML
75 INJECTION, SOLUTION INTRAVENOUS CONTINUOUS
Status: DISCONTINUED | OUTPATIENT
Start: 2023-02-14 | End: 2023-02-15 | Stop reason: HOSPADM

## 2023-02-14 RX ORDER — CEFAZOLIN SODIUM 2 G/50ML
SOLUTION INTRAVENOUS AS NEEDED
Status: DISCONTINUED | OUTPATIENT
Start: 2023-02-14 | End: 2023-02-14

## 2023-02-14 RX ORDER — LIDOCAINE HYDROCHLORIDE 10 MG/ML
INJECTION, SOLUTION EPIDURAL; INFILTRATION; INTRACAUDAL; PERINEURAL AS NEEDED
Status: DISCONTINUED | OUTPATIENT
Start: 2023-02-14 | End: 2023-02-14

## 2023-02-14 RX ORDER — FENTANYL CITRATE 50 UG/ML
INJECTION, SOLUTION INTRAMUSCULAR; INTRAVENOUS AS NEEDED
Status: DISCONTINUED | OUTPATIENT
Start: 2023-02-14 | End: 2023-02-14

## 2023-02-14 RX ADMIN — FENTANYL CITRATE 25 MCG: 50 INJECTION INTRAMUSCULAR; INTRAVENOUS at 11:19

## 2023-02-14 RX ADMIN — Medication 20 MG: at 11:10

## 2023-02-14 RX ADMIN — LIDOCAINE HYDROCHLORIDE 50 MG: 10 INJECTION, SOLUTION EPIDURAL; INFILTRATION; INTRACAUDAL; PERINEURAL at 11:06

## 2023-02-14 RX ADMIN — CEFAZOLIN SODIUM 2000 MG: 2 SOLUTION INTRAVENOUS at 11:05

## 2023-02-14 RX ADMIN — LIDOCAINE HYDROCHLORIDE 10 ML: 10 INJECTION, SOLUTION EPIDURAL; INFILTRATION; INTRACAUDAL; PERINEURAL at 11:15

## 2023-02-14 RX ADMIN — SODIUM CHLORIDE: 0.9 INJECTION, SOLUTION INTRAVENOUS at 11:00

## 2023-02-14 RX ADMIN — PROPOFOL 100 MCG/KG/MIN: 10 INJECTION, EMULSION INTRAVENOUS at 11:05

## 2023-02-14 RX ADMIN — Medication 10 MG: at 11:19

## 2023-02-14 RX ADMIN — ONDANSETRON HYDROCHLORIDE 4 MG: 2 INJECTION, SOLUTION INTRAMUSCULAR; INTRAVENOUS at 11:02

## 2023-02-14 RX ADMIN — IOHEXOL 15 ML: 350 INJECTION, SOLUTION INTRAVENOUS at 11:25

## 2023-02-14 RX ADMIN — LIDOCAINE HYDROCHLORIDE 1 APPLICATION: 20 JELLY TOPICAL at 11:12

## 2023-02-14 RX ADMIN — FENTANYL CITRATE 25 MCG: 50 INJECTION INTRAMUSCULAR; INTRAVENOUS at 11:06

## 2023-02-14 RX ADMIN — SODIUM CHLORIDE 75 ML/HR: 0.9 INJECTION, SOLUTION INTRAVENOUS at 08:45

## 2023-02-14 NOTE — INTERVAL H&P NOTE
Update: (This section must be completed if the H&P was completed greater than 24 hrs to procedure or admission)    H&P reviewed  After examining the patient, I find no changed to the H&P since it had been written  /67   Pulse 70   Temp (!) 97 3 °F (36 3 °C) (Temporal)   Resp 19   Ht 5' 8" (1 727 m)   Wt 108 kg (237 lb 12 8 oz)   SpO2 97%   BMI 36 16 kg/m²     Patient re-evaluated  Accept as history and physical     We will proceed with suprapubic catheter replacement/placement today      Morena Padron MD/February 14, 2023/11:03 AM

## 2023-02-14 NOTE — SEDATION DOCUMENTATION
Suprapubic catheter placement performed by Dr Janelle Nguyen without complication  Patient tolerated procedure well  Anesthesia present throughout case  Urethral morales removed  Report called to short stay nurse  IR Procedure Bedrest Start Time is 11:30

## 2023-02-14 NOTE — BRIEF OP NOTE (RAD/CATH)
INTERVENTIONAL RADIOLOGY PROCEDURE NOTE    Date: 2/14/2023    Procedure:   Procedure Summary     Date: 02/14/23 Room / Location: [address] [place] [address] Interventional Radiology    Anesthesia Start: 9454 Anesthesia Stop: 1535    Procedure: IR SUPRAPUBIC CATHETER PLACEMENT Diagnosis:       Urinary retention      (Unable to replace previoius SPT  replace via existing tract vs new catheter )    Scheduled Providers: Jose Mosley MD Responsible Provider: Jose Mosley MD    Anesthesia Type: IV sedation with anesthesia ASA Status: 4          Preoperative diagnosis:   1  Urinary retention         Postoperative diagnosis: Same  Surgeon: Sedrick Calderon MD     Assistant: None  No qualified resident was available  Blood loss: None    Specimens: None     Findings: Placement of a 16 [place] tip suprapubic morales catheter  Patient should follow-up with urology for exchanges  Complications: None immediate      Anesthesia: MAC sedation

## 2023-02-14 NOTE — DISCHARGE INSTRUCTIONS
Moderate Sedation   WHAT YOU NEED TO KNOW:   Moderate sedation, or conscious sedation, is medicine used during procedures to help you feel relaxed and calm  You will be awake and able to follow directions without anxiety or pain  You will remember little to none of the procedure  You may feel tired, weak, or unsteady on your feet after you get sedation  You may also have trouble concentrating or short-term memory loss  These symptoms should go away in 24 hours or less  DISCHARGE INSTRUCTIONS:   Call 911 or have someone else call for any of the following: You have sudden trouble breathing  You cannot be woken  Seek care immediately if:   You have a severe headache or dizziness  Your heart is beating faster than usual   Contact your healthcare provider if:   You have a fever  You have nausea or are vomiting for more than 8 hours after the procedure  Your skin is itchy, swollen, or you have a rash  You have questions or concerns about your condition or care  Self-care:   Have someone stay with you for 24 hours  This person can drive you to errands and help you do things around the house  This person can also watch for problems  Rest and do quiet activities for 24 hours  Do not exercise, ride a bike, or play sports  Stand up slowly to prevent dizziness and falls  Take short walks around the house with another person  Slowly return to your usual activities the next day  Do not drive or use dangerous machines or tools for 24 hours  You may injure yourself or others  Examples include a lawnmower, saw, or drill  Do not return to work for 24 hours if you use dangerous machines or tools for work  Do not make important decisions for 24 hours  For example, do not sign important papers or invest money  Drink liquids as directed  Liquids help flush the sedation medicine out of your body  Ask how much liquid to drink each day and which liquids are best for you        Eat small, frequent meals to prevent nausea and vomiting  Start with clear liquids such as juice or broth  If you do not vomit after clear liquids, you can eat your usual foods  Do not drink alcohol or take medicines that make you drowsy  This includes medicines that help you sleep and anxiety medicines  Ask your healthcare provider if it is safe for you to take pain medicine  Follow up with your healthcare provider as directed: Write down your questions so you remember to ask them during your visits  © 2017 [facility] Information is for End User's use only and may not be sold, redistributed or otherwise used for commercial purposes  All illustrations and images included in CareNotes® are the copyrighted property of A D A M , Inc  or [employer]  The above information is an  only  It is not intended as medical advice for individual conditions or treatments  Talk to your doctor, nurse or pharmacist before following any medical regimen to see if it is safe and effective for you  Suprapubic Cystostomy     WHAT YOU NEED TO KNOW:   Suprapubic cystostomy is surgery to create a stoma (opening) through your abdomen into your bladder  This opening is where a catheter is inserted to drain urine  You may need a cystostomy if your urine flow is blocked  DISCHARGE INSTRUCTIONS:   Resume your normal diet  Small sips of flat soda will help with mild nausea  Follow up with your healthcare provider as directed: You may need to return to have your suprapubic catheter changed or removed  Write down your questions so you remember to ask them during your visits  Empty your urine drainage bag: Empty your urine drainage bag when it is ½ to ? full, or every 8 hours  If you have a smaller leg bag, empty it every 3 to 4 hours  Do the following when you empty your urine drainage bag:  Hold the urine bag over a toilet or large container      Remove the drain spout from its sleeve at the bottom of the urine bag  Do not touch the tip of the drain spout  Open the slide valve on the spout  Let the urine flow out of the urine bag into the toilet or container  Do not let the drainage tube touch anything  Clean the end of the drain spout with alcohol when the bag is empty  Ask which cleaning solution is best to use  Close the slide valve and put the drain spout into its sleeve at the bottom of the urine bag  Write down how much urine was in your bag if you were asked to keep a record  Prevent an infection:   Clean the stoma and skin around it daily  Wash your hands before and after cystostomy care  Put on a new pair of clean medical gloves  Change your urine bag or clean reusable bags  Ask how often you need to change or clean your urine drainage bag  You may need to change your reusable bag at least once a week  Keep the bag below your waist  This will prevent urine from flowing back into your bladder and causing an infection or other problems  Also, keep the tube free of kinks so the urine will drain properly  Do not pull on the catheter  This can cause pain and bleeding and may cause the catheter to come out  Contact Interventional Radiology at [phone] Al[place] PATIENTS: Contact Interventional Radiology at [phone]) Luis Enrique Adams PATIENTS: Contact Interventional Radiology at [phone]) if any of the following occur: You have a fever or chills  Persistent nausea or vomiting  You have severe pain  You have a burning pain in your stoma  Your stoma is red, swollen, or draining pus  You have blood in your urine  You have questions or concerns about your condition or care  Seek care immediately or call 911 if:   No urine is draining into the urine bag

## 2023-02-14 NOTE — ANESTHESIA PREPROCEDURE EVALUATION
Procedure:  IR SUPRAPUBIC CATHETER PLACEMENT    Relevant Problems   CARDIO   (+) CAD (coronary artery disease)   (+) Chest pain   (+) Chronic combined systolic and diastolic congestive heart failure (HCC)   (+) HTN (hypertension)   (+) Mixed hyperlipidemia   (+) Stable angina (HCC)      ENDO   (+) Acquired hypothyroidism   (+) Type 2 diabetes mellitus with chronic kidney disease on chronic dialysis, with long-term current use of insulin (HCC)      GI/HEPATIC   (+) Gastroesophageal reflux disease without esophagitis      /RENAL   (+) Dependence on renal dialysis (HCC)   (+) ESRD (end stage renal disease) (HCC)      HEMATOLOGY   (+) Anemia      NEURO/PSYCH   (+) Continuous opioid dependence ([facility])   (+) Major depressive disorder   (+) Severe episode of recurrent major depressive disorder, without psychotic features ([facility])   (+) Stable angina ([facility])        Physical Exam    Airway    Mallampati score: II  TM Distance: >3 FB  Neck ROM: full     Dental   No notable dental hx     Cardiovascular  Cardiovascular exam normal    Pulmonary  Pulmonary exam normal     Other Findings    7/1/22     • Mid Cx lesion is 80% stenosed  • Prox RCA lesion is 95% stenosed  CAD, with 80% stenosis, mid dominant circumflex  Lesion was just proximal to bifurcation of large OM1 branch  Circ lesion was flow-signifcant (iFR=0 76)  The large OM branch had been stented in the past, and contained a non-critical proximal stenosis  The small, non-dominant RCA was severely diseased  Successful IVUS-guided PCI of mid circumflex under GuideLiner support, with reduction in stenosis from 80% to 0% following placement of a 3 5x33mm JANET  The jailed OM1 branch remained widely patent  Plan: DAPT, statin, lifestyle intervention       Normal sinus rhythm  Left bundle branch block  Abnormal ECG  When compared with ECG of 25-AUG-2022 19:15,  QRS duration has decreased  T wave inversion no longer evident in Inferior leads  T wave inversion more evident in Lateral leads       1  Normal left ventricular size, moderate concentric left ventricular hypertrophy, moderately reduced left ventricular systolic function with marked regional wall motion abnormalities as described below  Ejection fraction is estimated as around 38%  2  Normal right ventricular size and systolic function  3  Mild left atrial cavity enlargement  4  Aortic valve sclerosis, trace aortic valve regurgitation  5  Mitral valve sclerosis with some focal calcification, mild mitral valve regurgitation  6  Mild tricuspid and trace pulmonic valve regurgitation  7  No obvious pulmonary hypertension  8  Varying degrees of trace to small circumferential pericardial effusion without echocardiographic evidence of cardiac tamponade  Anesthesia Plan  ASA Score- 4     Anesthesia Type- IV sedation with anesthesia with ASA Monitors  Additional Monitors:   Airway Plan:           Plan Factors-Exercise tolerance (METS): >4 METS  Chart reviewed  EKG reviewed  Imaging results reviewed  Existing labs reviewed  Patient summary reviewed  Patient is not a current smoker  Patient not instructed to abstain from smoking on day of procedure  Patient did not smoke on day of surgery  Obstructive sleep apnea risk education given perioperatively  Induction-     Postoperative Plan-     Informed Consent- Anesthetic plan and risks discussed with patient  I personally reviewed this patient with the CRNA  Discussed and agreed on the Anesthesia Plan with the CRNA  Michael Wilson

## 2023-02-14 NOTE — ANESTHESIA POSTPROCEDURE EVALUATION
Post-Op Assessment Note    CV Status:  Stable  Pain Score: 0    Pain management: adequate  Multimodal analgesia used between 6 hours prior to anesthesia start to PACU discharge    Mental Status:  Alert   Hydration Status:  Euvolemic   PONV Controlled:  None   Airway Patency:  Patent      Post Op Vitals Reviewed: Yes      Staff: CRNA         No notable events documented      BP   131/63   Temp     Pulse  69   Resp   12   SpO2   96

## 2023-02-15 ENCOUNTER — HOME CARE VISIT (OUTPATIENT)
Dept: HOME HEALTH SERVICES | Facility: HOME HEALTHCARE | Age: 63
End: 2023-02-15

## 2023-02-15 VITALS
RESPIRATION RATE: 18 BRPM | TEMPERATURE: 97.2 F | OXYGEN SATURATION: 97 % | DIASTOLIC BLOOD PRESSURE: 62 MMHG | SYSTOLIC BLOOD PRESSURE: 130 MMHG | HEART RATE: 69 BPM

## 2023-02-15 NOTE — CASE COMMUNICATION
Seen pt for home visit today  Pt has been complaining on ongoing nausea x 1 month  Pt did not know she had zofran and SN found it among pills and gave pt a dose, however pt reports the nausea is preventing pt from eating and drinking  Pt has yellow urine draining from suprapubic catheter  Despite not being able to eat, pt still taking insulin as prescribed and blood sugar 167 during visit today  Education provided on nausea management a nd diet  Pt and cg concerned for this ongoing nausea and would like to discuss with you

## 2023-02-16 ENCOUNTER — PATIENT OUTREACH (OUTPATIENT)
Dept: FAMILY MEDICINE CLINIC | Facility: CLINIC | Age: 63
End: 2023-02-16

## 2023-02-16 NOTE — PROGRESS NOTES
Outgoing Call:  2/16/2023    Cici Mariamaal called pt to discuss status of assessment appointment with [employer] of Aging  Pt informed she did complete this yesterday afternoon and was informed she does meet criteria for waiver services  Cici Mcarthur informed AAA does have 48 hours to report to [facility] and did check on status/update on [facility] website  Status still inactive  Cici Lyubov informed she will call [facility] on Monday to request an update  Pt expressed understanding  Pt appeared to be tired and informed she just has not been feeling well for the past two weeks  CMOC expressed understanding  Pt stated that she is hopefull she will have better days ahead of her  Cici Mcarthur agreed  Next outreach is scheduled for 2/20/2023

## 2023-02-20 ENCOUNTER — PATIENT OUTREACH (OUTPATIENT)
Dept: FAMILY MEDICINE CLINIC | Facility: CLINIC | Age: 63
End: 2023-02-20

## 2023-02-20 NOTE — PROGRESS NOTES
I received a call from the patient stating she was offered edibles by her PCP; note sent  The patient is willing to give this a try as she reports her pain is severe  She notes the pain is mostly located in her back, shoulders and knees  The patient states Oxycodone is not helping and she has been taking a lot of Tylenol without improvement either  She notes the pain is keeping her awake at nighttime  I will continue to follow

## 2023-02-21 ENCOUNTER — PATIENT OUTREACH (OUTPATIENT)
Dept: FAMILY MEDICINE CLINIC | Facility: CLINIC | Age: 63
End: 2023-02-21

## 2023-02-21 ENCOUNTER — TELEPHONE (OUTPATIENT)
Dept: VASCULAR SURGERY | Facility: CLINIC | Age: 63
End: 2023-02-21

## 2023-02-21 NOTE — PROGRESS NOTES
Chart Review / Outgoing Calls:  2/21/2023    [address] completed a chart review and checked on status of pt's waiver application  Status states application is inactive  CMOC called [employer] and was informed pt not eligible  [address] informed pt was reassessed by AAA just two weeks ago and was informed pt is now eligible  CMOC was placed on hold while review was completed and then informed they do see a new status however would not confirm pt was approved  CMOC was informed a medical review will need to take place and should have an update by end of week  CMOC called pt and informed her of this  CMOC also informed they may want to complete a new financial review with [employer]  If [employer] decides not to complete a financial review, pt may receive a call from her new Service Coordinating agency  Pt expressed understanding and thanked Seth Callejas Karrie Sohail for her time  Next outreach is scheduled for 2/24/2023  Incoming Call:  Seth Sauceda received call from pt at end of day and informed she is interested in applying for [employer]  Pt states she previously received this service and would like for it to be restored  CMOC expressed understanding and informed pt she will reach out to AAA tomorrow and inquire about [employer] for her  CMOC to f/u

## 2023-02-21 NOTE — TELEPHONE ENCOUNTER
Patients Name: Kartik Plata  : 1960  Phone Number: [phone]  Appointment Date:  23  Appointment time: 11:30am   Address: [address] [place] [address]  Drop off Facility/Office Name: [address] [place] [address],[address]  Drop off  Address: [address] [place] [address], [place], [address]: [phone]  Special notes/directions for   Note for scheduling team

## 2023-02-21 NOTE — PROGRESS NOTES
I called the patient's daughter, Cierra Calixto, since the patient is currently at dialysis  I informed her of the process to obtain a medical marijuana card and asked that she go online to find local physicians who prescribe  Yenny asked for a local dental office; I provided the phone number for [facility]     I will continue to follow

## 2023-02-22 ENCOUNTER — OFFICE VISIT (OUTPATIENT)
Dept: VASCULAR SURGERY | Facility: CLINIC | Age: 63
End: 2023-02-22

## 2023-02-22 ENCOUNTER — PATIENT OUTREACH (OUTPATIENT)
Dept: FAMILY MEDICINE CLINIC | Facility: CLINIC | Age: 63
End: 2023-02-22

## 2023-02-22 VITALS
OXYGEN SATURATION: 98 % | WEIGHT: 243.2 LBS | HEIGHT: 68 IN | DIASTOLIC BLOOD PRESSURE: 40 MMHG | SYSTOLIC BLOOD PRESSURE: 114 MMHG | BODY MASS INDEX: 36.86 KG/M2 | HEART RATE: 76 BPM

## 2023-02-22 DIAGNOSIS — N18.6 ESRD (END STAGE RENAL DISEASE) (HCC): Primary | ICD-10-CM

## 2023-02-22 RX ORDER — CYANOCOBALAMIN 1000 UG/ML
INJECTION, SOLUTION INTRAMUSCULAR; SUBCUTANEOUS
COMMUNITY
Start: 2023-02-09 | End: 2023-02-22 | Stop reason: SDUPTHER

## 2023-02-22 NOTE — PROGRESS NOTES
Assessment/Plan:    ESRD (end stage renal disease) ([facility] )  Lab Results   Component Value Date    EGFR 14 02/06/2023    EGFR 21 01/17/2023    EGFR 23 01/16/2023    CREATININE 3 28 (H) 02/06/2023    CREATININE 2 38 (H) 01/17/2023    CREATININE 2 25 (H) 01/16/2023   s/p L BC AVF 2/6  Incision has healed nicely  There is a good thrill and bruit    Will obtain a HD duplex to assess for maturation in 2 weeks  Suspect if fistula has matured adequately that she will require superficialization due to arm circumference  Diagnoses and all orders for this visit:    ESRD (end stage renal disease) ([facility] )  -     VAS hemodialysis access duplex left upper limb avf; Future    Other orders  -     Discontinue: cyanocobalamin 1,000 mcg/mL          Subjective:      Patient ID: Drew Flanagan is a 63 y o  female  Patient is here today s/p a LUE creation of a AVF done 2/6/2023  Patient also had a supra pubic catheter place on 2/14/2023 by IR/Dr Christopher Harding  Her LUE incision is clean and dry  She is positive for bruit and thrill  She c/o soreness at the site of the incision  She c/o numbness and tingling in her left hand and fingers, but she admits to neuropathy in her had prior to the surgery  Patient is getting dialysis via perm-cath on right side of chest on T-TH-S at [facility] in [facility]  Patient is taking ASA 81 mg, Plavix and Atorvastatin  She is a former smoker  Alexa Garcia returns to the office for routine postoperative visit status post left brachiocephalic AV fistula creation  The following portions of the patient's history were reviewed and updated as appropriate: allergies, current medications, past family history, past medical history, past social history, past surgical history and problem list     Review of Systems   Constitutional: Positive for fatigue  HENT: Negative  Eyes: Negative  Respiratory: Positive for shortness of breath (SOB with Activity)  Cardiovascular: Positive for chest pain  Gastrointestinal: Negative  Endocrine: Negative  Genitourinary:        Patient has supra pubic cath   Musculoskeletal: Positive for arthralgias  Skin: Negative  Allergic/Immunologic: Negative  Neurological: Negative  Hematological: Bruises/bleeds easily  Psychiatric/Behavioral: Negative  Objective:      BP (!) 114/40 (BP Location: Right arm, Patient Position: Sitting, Cuff Size: Large)   Pulse 76   Ht 5' 8" (1 727 m)   Wt 110 kg (243 lb 3 2 oz)   SpO2 98%   BMI 36 98 kg/m²          Physical Exam        LUE Fistula:   Incision well healed  Good thrill and bruit  Fistula appears deep and lik

## 2023-02-22 NOTE — ASSESSMENT & PLAN NOTE
Lab Results   Component Value Date    EGFR 14 02/06/2023    EGFR 21 01/17/2023    EGFR 23 01/16/2023    CREATININE 3 28 (H) 02/06/2023    CREATININE 2 38 (H) 01/17/2023    CREATININE 2 25 (H) 01/16/2023   s/p L BC AVF 2/6  Incision has healed nicely  There is a good thrill and bruit    Will obtain a HD duplex to assess for maturation in 2 weeks  Suspect if fistula has matured adequately that she will require superficialization due to arm circumference

## 2023-02-22 NOTE — PROGRESS NOTES
Outgoing Calls:  2/22/2023    [facility] Britta Sauceda completed a chart review  Checked on status of waiver application  Pt has been approved  A financial review may be needed and we are now waiting to hear from [facility] for an update  CMOC called [employer] however they are not accepting calls at this time due to technical difficulties  [facility] Britta Sauceda attempted to reach out via their website however that is down as well  [facility] Britta Sauceda called pt and updated her on waiver and [employer] status  Pt thanked [address] [place] [address] Karrie Sauceda for the update  [facility] Britta Sauceda informed pt she will call her again tomorrow and we can attempt to reach out to [address] again  Pt agreed  CMOC also informed pt once she is assigned to a new [place] for waiver services they will be able to enroll her immediately into a food delivery program  Pt expressed understanding  Next outreach is scheduled for 2/23/2023

## 2023-02-22 NOTE — TELEPHONE ENCOUNTER
Elen Rodas; YOHAN Patient [facility]  Scheduled for a [phone]  today, thank you!

## 2023-02-23 ENCOUNTER — HOME CARE VISIT (OUTPATIENT)
Dept: HOME HEALTH SERVICES | Facility: HOME HEALTHCARE | Age: 63
End: 2023-02-23

## 2023-02-23 ENCOUNTER — PATIENT OUTREACH (OUTPATIENT)
Dept: FAMILY MEDICINE CLINIC | Facility: CLINIC | Age: 63
End: 2023-02-23

## 2023-02-23 VITALS
SYSTOLIC BLOOD PRESSURE: 110 MMHG | TEMPERATURE: 97.1 F | RESPIRATION RATE: 16 BRPM | HEART RATE: 66 BPM | DIASTOLIC BLOOD PRESSURE: 64 MMHG | OXYGEN SATURATION: 94 %

## 2023-02-23 NOTE — PROGRESS NOTES
Outgoing Calls:  2/23/2023    [facility] called pt to assist in calling [employer] to inquire about status of waiver and financial review  Once call was connected we were informed [employer] just received the waiver application this morning and will need a few days to review it  [facility] informed pt we can try calling again tomorrow  Pt agreed  [facility] attempted to call [employer] and phone lines are still down  Calls go through but recording mentions lines are down once call is routed to   [facility] checked website and that is still down as well  CMOC to f/u  Next outreach is scheduled for 2/24/2023

## 2023-02-23 NOTE — CASE COMMUNICATION
Ship to Pt   Home       Insurance [facility]    Drain sponges 4x4 split [id number]  Transpore white  2in [id number]

## 2023-02-24 ENCOUNTER — PATIENT OUTREACH (OUTPATIENT)
Dept: FAMILY MEDICINE CLINIC | Facility: CLINIC | Age: 63
End: 2023-02-24

## 2023-02-24 NOTE — PROGRESS NOTES
Outgoing Calls:  2/24/2023    [facility] called pt and assisted in calling QUEENIE to inquire about status of waiver application and if pt will need to complete a financial review  We were informed application has not been reviewed yet by CW and pt will receive notification via mail with nest week or so  CMOC will f/u again with pt  CMOC called [employer] with pt and recording states lines are down however was able to leave a VM requesting a call in return  CMOC to f/u  Next outreach is scheduled for 3/3/2023

## 2023-02-28 ENCOUNTER — PATIENT OUTREACH (OUTPATIENT)
Dept: FAMILY MEDICINE CLINIC | Facility: CLINIC | Age: 63
End: 2023-02-28

## 2023-02-28 ENCOUNTER — OFFICE VISIT (OUTPATIENT)
Dept: FAMILY MEDICINE CLINIC | Facility: CLINIC | Age: 63
End: 2023-02-28

## 2023-02-28 VITALS
HEART RATE: 86 BPM | SYSTOLIC BLOOD PRESSURE: 150 MMHG | TEMPERATURE: 97.4 F | DIASTOLIC BLOOD PRESSURE: 70 MMHG | WEIGHT: 251 LBS | OXYGEN SATURATION: 97 % | BODY MASS INDEX: 38.04 KG/M2 | HEIGHT: 68 IN | RESPIRATION RATE: 16 BRPM

## 2023-02-28 DIAGNOSIS — J06.9 VIRAL URI: ICD-10-CM

## 2023-02-28 DIAGNOSIS — N30.90 CYSTITIS: Primary | ICD-10-CM

## 2023-02-28 DIAGNOSIS — G89.29 CHRONIC LEFT SHOULDER PAIN: ICD-10-CM

## 2023-02-28 DIAGNOSIS — M25.512 CHRONIC LEFT SHOULDER PAIN: ICD-10-CM

## 2023-02-28 LAB
SARS-COV-2 AG UPPER RESP QL IA: NEGATIVE
SL AMB  POCT GLUCOSE, UA: ABNORMAL
SL AMB LEUKOCYTE ESTERASE,UA: ABNORMAL
SL AMB POCT BILIRUBIN,UA: ABNORMAL
SL AMB POCT BLOOD,UA: ABNORMAL
SL AMB POCT CLARITY,UA: ABNORMAL
SL AMB POCT COLOR,UA: YELLOW
SL AMB POCT KETONES,UA: ABNORMAL
SL AMB POCT NITRITE,UA: ABNORMAL
SL AMB POCT PH,UA: 6
SL AMB POCT SPECIFIC GRAVITY,UA: 1.01
SL AMB POCT URINE PROTEIN: ABNORMAL
SL AMB POCT UROBILINOGEN: ABNORMAL
VALID CONTROL: NORMAL

## 2023-02-28 RX ORDER — LIDOCAINE 40 MG/G
CREAM TOPICAL AS NEEDED
Qty: 30 G | Refills: 0 | Status: SHIPPED | OUTPATIENT
Start: 2023-02-28

## 2023-02-28 RX ORDER — AMOXICILLIN AND CLAVULANATE POTASSIUM 875; 125 MG/1; MG/1
1 TABLET, FILM COATED ORAL EVERY 12 HOURS SCHEDULED
Qty: 14 TABLET | Refills: 0 | Status: SHIPPED | OUTPATIENT
Start: 2023-02-28 | End: 2023-03-07

## 2023-02-28 NOTE — PROGRESS NOTES
I received a call from the patient stating she is having sinus pressure and congestion for 2 days  She denies fever but is requesting an appointment to be prescribed antibiotics  The patient also reported symptoms of urgency  She notes she recently had a catheter reinsertion and states she is draining cloudy urine with sediment  The patient did not check her blood sugar today  She states she has been eating and drinking with issues  Note sent to clerical and patient was scheduled for a same-day appointment this afternoon  I will continue to follow

## 2023-02-28 NOTE — PROGRESS NOTES
Name: Hoang Krause      : 1960      MRN: [id number]  Encounter Provider: [facility]  Encounter Date: 2023   Encounter department: [address] [place] [address]     1  Cystitis  Assessment & Plan:  -POCT ua consistent with UTI  -Reviewed urine Culture from 2023  -Will provide Augmentin  -Follow up with urology outpatient for Catheter exchange    Orders:  -     POCT urine dip  -     amoxicillin-clavulanate (Augmentin) 875-125 mg per tablet; Take 1 tablet by mouth every 12 (twelve) hours for 7 days  -     Urine culture; Future  -     Urine culture    2  Viral URI  Assessment & Plan:  Rapid covid negative  Recommend supportive care    Orders:  -     POCT Rapid Covid Ag  -     sodium chloride (OCEAN) 0 65 % nasal spray; 1 spray into each nostril as needed for congestion    3  Chronic left shoulder pain  Assessment & Plan:  -Acute on Chronic  -Recommend obtaining Xray ordered by PCP  -Recommend tylenol 1000 mg Q8  -Referral to Orthopedic per patient request      Orders:  -     lidocaine (LMX) 4 % cream; Apply topically as needed for mild pain  -     Ambulatory Referral to Orthopedic Surgery; Future         Subjective      46-year-old female with a past medical history of end-stage renal disease who presents today reporting that her sinuses has been acting up  She reports 2-day onset of nasal congestion and dry cough  She denies any sick contact  She denies any recent travel  She reports that she has not been feeling herself lately  She also reports chronic left shoulder pain  She has not gotten x-rays done ordered by PCP for this  Her shoulder pain has gotten worse the past couple of days  she is requesting to see a specialist   She also reports burning on urination and urgency for the past 3 days  She has had urinary tract infection before  She denies any fever, nausea, vomiting or flank pain            Review of Systems Constitutional: Negative for chills, fatigue and fever  Respiratory: Negative for shortness of breath  Gastrointestinal: Negative for abdominal pain, diarrhea, nausea and vomiting  Musculoskeletal: Positive for arthralgias and back pain  Skin: Negative for rash  Neurological: Negative for seizures, syncope and headaches         Current Outpatient Medications on File Prior to Visit   Medication Sig   • allopurinol (ZYLOPRIM) 300 mg tablet Take 1 tablet (300 mg total) by mouth daily   • aspirin (ECOTRIN LOW STRENGTH) 81 mg EC tablet Take 1 tablet (81 mg total) by mouth daily   • atorvastatin (LIPITOR) 40 mg tablet Take 1 tablet (40 mg total) by mouth daily   • Blood Glucose Monitoring Suppl (OneTouch Verio) w/Device KIT Use in the morning   • calcitriol (ROCALTROL) 0 5 MCG capsule Take 1 capsule (0 5 mcg total) by mouth 3 (three) times a week   • carvedilol (COREG) 25 mg tablet Take 1 tablet (25 mg total) by mouth 2 (two) times a day with meals   • citalopram (CeleXA) 20 mg tablet Take 1 tablet (20 mg total) by mouth daily   • clopidogrel (PLAVIX) 75 mg tablet Take 1 tablet (75 mg total) by mouth daily   • Cyanocobalamin (Vitamin Deficiency System-B12) 1000 MCG/ML KIT Inject 1 mL (1,000 mcg total) into a muscle every 30 (thirty) days   • docusate sodium (COLACE) 50 mg capsule Take 1 capsule (50 mg total) by mouth 2 (two) times a day   • dulaglutide (Trulicity) 1 5 WH/6 7AX injection Inject 0 5 mL (1 5 mg total) under the skin once a week   • furosemide (LASIX) 80 mg tablet Take 1 tablet (80 mg total) by mouth daily   • glucose blood (OneTouch Verio) test strip Use as instructed   • hydrALAZINE (APRESOLINE) 25 mg tablet Take 1 tablet (25 mg total) by mouth 3 (three) times a day Hold SBP <100   • Incontinence Supplies (Urinary Drainage Bag) MISC Attach one bag to suprapubic catheter as needed   • Incontinence Supplies (Urinary Leg Bag) KIT Attach one bag to suprapubic catheter morales as needed   • Insulin Glargine Solostar (Lantus SoloStar) 100 UNIT/ML SOPN Inject 0 55 mL (55 Units total) under the skin daily IN PM   • insulin lispro (HumaLOG) 100 units/mL injection pen INJECT 20 UNITS UNDER THE SKIN 3 (THREE) TIMES A DAY WITH MEALS   • Insulin Pen Needle (BD Pen Needle Micro U/F) 32G X 6 MM MISC Use 5 (five) times a day   • Insulin Pen Needle (BD Pen Needle Joanne 2nd Gen) 32G X 4 MM MISC Inject under the skin 4 (four) times a day Use as directed   • Insulin Syringe-Needle U-100 (BD Veo Insulin Syringe U/F) 31G X 15/64" 0 5 ML MISC Inject under the skin 3 (three) times a day   • isosorbide mononitrate (IMDUR) 30 mg 24 hr tablet Take 1 tablet (30 mg total) by mouth every evening   • ketoconazole (NIZORAL) 2 % cream Apply to suprapubic catheter site twice daily  • Lancets (OneTouch Delica Plus [id number]) MISC USE TO TEST 3 TIMES DAILY   • levothyroxine 50 mcg tablet Take 1 tablet (50 mcg total) by mouth daily   • losartan (COZAAR) 25 mg tablet Take 1 tablet (25 mg total) by mouth daily   • naloxone (NARCAN) 4 mg/0 1 mL nasal spray Administer 1 spray into a nostril  If breathing does not return to normal or if breathing difficulty resumes after 2-3 minutes, give another dose in the other nostril using a new spray     • nitroglycerin (NITROSTAT) 0 4 mg SL tablet Place 1 tablet (0 4 mg total) under the tongue every 5 (five) minutes as needed for chest pain   • nystatin (MYCOSTATIN) powder Apply topically 2 (two) times a day   • OLANZapine (ZyPREXA) 5 mg tablet Take 1 tablet (5 mg total) by mouth daily at bedtime   • ondansetron (ZOFRAN) 4 mg tablet Take 1 tablet (4 mg total) by mouth every 8 (eight) hours as needed for nausea or vomiting   • OneTouch Delica Lancets 03L MISC Use 3 (three) times a day   • OneTouch Ultra test strip USE 1 EACH DAILY USE AS INSTRUCTED   • oxyCODONE (ROXICODONE) 5 immediate release tablet Take 1 tablet (5 mg total) by mouth every 8 (eight) hours as needed for severe pain Max Daily Amount: 15 mg • pantoprazole (PROTONIX) 40 mg tablet Take 1 tablet (40 mg total) by mouth daily   • silver sulfadiazine (SILVADENE,SSD) 1 % cream Apply topically daily   • [DISCONTINUED] oxygen gas Inhale 2 L/min continuous  Indications: Respiratory Failure   • [DISCONTINUED] Torsemide 40 MG TABS Take 40 mg by mouth daily       Objective     /70 (BP Location: Right arm, Patient Position: Sitting, Cuff Size: Large)   Pulse 86   Temp (!) 97 4 °F (36 3 °C) (Temporal)   Resp 16   Ht 5' 8" (1 727 m)   Wt 114 kg (251 lb)   SpO2 97%   BMI 38 16 kg/m²     Physical Exam  Vitals and nursing note reviewed  Constitutional:       General: She is not in acute distress  Appearance: Normal appearance  She is well-developed  She is not ill-appearing, toxic-appearing or diaphoretic  HENT:      Head: Normocephalic and atraumatic  Right Ear: External ear normal       Left Ear: External ear normal       Nose: Nose normal       Mouth/Throat:      Mouth: Mucous membranes are moist       Pharynx: No oropharyngeal exudate or posterior oropharyngeal erythema  Eyes:      Extraocular Movements: Extraocular movements intact  Conjunctiva/sclera: Conjunctivae normal    Neck:      Thyroid: No thyromegaly  Vascular: No JVD  Trachea: No tracheal deviation  Cardiovascular:      Rate and Rhythm: Normal rate and regular rhythm  Pulses:           Dorsalis pedis pulses are 2+ on the right side and 2+ on the left side  Posterior tibial pulses are 2+ on the right side and 2+ on the left side  Heart sounds: Normal heart sounds  No murmur heard  No friction rub  No gallop  Pulmonary:      Effort: Pulmonary effort is normal  No respiratory distress  Breath sounds: Normal breath sounds  No stridor  No wheezing or rhonchi  Abdominal:      General: Bowel sounds are normal  There is no distension  Palpations: Abdomen is soft  There is no mass  Tenderness: There is no guarding or rebound  Musculoskeletal:      Right shoulder: No swelling, deformity, effusion, tenderness, bony tenderness or crepitus  Normal range of motion  Normal strength  Normal pulse  Left shoulder: Bony tenderness present  No swelling, deformity, effusion, tenderness or crepitus  Decreased range of motion  Normal strength  Normal pulse  Cervical back: Normal range of motion and neck supple  Right lower leg: No edema  Left lower leg: No edema  Feet:      Right foot:      Skin integrity: No ulcer, skin breakdown, erythema, warmth, callus or dry skin  Left foot:      Skin integrity: No ulcer, skin breakdown, erythema, warmth, callus or dry skin  Skin:     General: Skin is warm  Capillary Refill: Capillary refill takes less than 2 seconds  Findings: No lesion or rash  Neurological:      General: No focal deficit present  Mental Status: She is alert and oriented to person, place, and time  Motor: No abnormal muscle tone     Psychiatric:         Mood and Affect: Mood normal          Behavior: Behavior normal        [address], [place]

## 2023-02-28 NOTE — ASSESSMENT & PLAN NOTE
-POCT ua consistent with UTI  -Reviewed urine Culture from 1/2023  -Will provide Augmentin  -Follow up with urology outpatient for Catheter exchange

## 2023-02-28 NOTE — ASSESSMENT & PLAN NOTE
-Acute on Chronic  -Recommend obtaining Xray ordered by PCP  -Recommend tylenol 1000 mg Q8  -Referral to Orthopedic per patient request

## 2023-03-01 ENCOUNTER — TELEPHONE (OUTPATIENT)
Dept: OTHER | Facility: OTHER | Age: 63
End: 2023-03-01

## 2023-03-01 ENCOUNTER — HOME CARE VISIT (OUTPATIENT)
Dept: HOME HEALTH SERVICES | Facility: HOME HEALTHCARE | Age: 63
End: 2023-03-01

## 2023-03-01 ENCOUNTER — PATIENT OUTREACH (OUTPATIENT)
Dept: FAMILY MEDICINE CLINIC | Facility: CLINIC | Age: 63
End: 2023-03-01

## 2023-03-01 VITALS
DIASTOLIC BLOOD PRESSURE: 80 MMHG | OXYGEN SATURATION: 95 % | RESPIRATION RATE: 18 BRPM | HEART RATE: 96 BPM | TEMPERATURE: 97.8 F | SYSTOLIC BLOOD PRESSURE: 150 MMHG

## 2023-03-01 NOTE — PROGRESS NOTES
Incoming / Jm Mccoy Calls:  3/1/2023    CMOC did receive a call from pt requesting an update on waiver and [employer]  [address] [place] [address] assisted pt in calling [employer] to inquire about status of waiver  We were informed pt will need to provide proof she no longer owns a home in [place]  Pt stated that she does not know how to get that information and does not remember name of person she used to pay the rent to  Pt states that it was a rent to own contract and she never followed through with it  DPW rep informed letter will explain exactly what is needed  [address] [place] [address] requested a call from CW to discuss expediting this case  CMOC was informed she will receive call within 72 business hours  CMOC called [employer] with pt and were informed by rep they are currently taking calls and do not have any access to pt records as their entire system has been down for just over a week  Pt was encouraged to call tomorrow and f/u  Pt agreed to call with her daughter Nicole Tiwari and [place]  Next outreach is scheduled for 3/3/2023

## 2023-03-01 NOTE — TELEPHONE ENCOUNTER
Call from patient advising she has a catheter in and was advised to follow up with urology  Please return her call

## 2023-03-02 LAB
BACTERIA UR CULT: ABNORMAL

## 2023-03-02 NOTE — TELEPHONE ENCOUNTER
Called and spoke with Crystal  Patient had 16 Fr Silicone SPT placed by IR on 2/14/23  Plan for 1st exchange in 6 weeks  Patient scheduled 3/27/23 @ 0930 in the [facility]  Provider in office  Patient requested appointment information be placed in the mail as well, as she does not use her MyChart  Appointment details printed and placed in outgoing mail on this date

## 2023-03-03 ENCOUNTER — PATIENT OUTREACH (OUTPATIENT)
Dept: FAMILY MEDICINE CLINIC | Facility: CLINIC | Age: 63
End: 2023-03-03

## 2023-03-04 NOTE — PROGRESS NOTES
Text / Web:  3/3/2023    [facility] received a text message from pt's daughter, Nicolasa Galicia she reached out to [employer] however their system is still down  She asked for [facility] to refer pt to MOW  CMOC completed MOW referral online and informed Yenny process will take approximately two weeks  Pt should expect a call next week to complete phone assessment  If approved will begin to receive services a week or two after assessment  CMOC to f/u  Next outreach is scheduled for 3/6/2023

## 2023-03-06 ENCOUNTER — PATIENT OUTREACH (OUTPATIENT)
Dept: FAMILY MEDICINE CLINIC | Facility: CLINIC | Age: 63
End: 2023-03-06

## 2023-03-06 NOTE — PROGRESS NOTES
Outgoing Calls:  3/6/2023    OC called pt to discuss MOW application and status of waiver  [address] [place] [address] informed pt MOW application was placed last Friday and can take up to two weeks to receive call from [facility]  Pt expressed understanding  [address] [place] [address] also discussed waiver status and pt states she has not received a letter from Mattie Dhillon if she will need a financial review  [address] [place] [address] facilitated a three way call to [facility] to inquire  After a lengthy hold period we were assisted and informed a decision has not been made and pt should receive a call from [facility] by end of day  OC to f/u  Next outreach is scheduled for 3/7/2023

## 2023-03-07 ENCOUNTER — PATIENT OUTREACH (OUTPATIENT)
Dept: FAMILY MEDICINE CLINIC | Facility: CLINIC | Age: 63
End: 2023-03-07

## 2023-03-07 DIAGNOSIS — I10 PRIMARY HYPERTENSION: ICD-10-CM

## 2023-03-07 RX ORDER — FUROSEMIDE 80 MG
80 TABLET ORAL DAILY
Qty: 90 TABLET | Refills: 3 | Status: SHIPPED | OUTPATIENT
Start: 2023-03-07

## 2023-03-07 NOTE — PROGRESS NOTES
Outgoing Calls:  3/7/2023    [facility] called pt to assist her in calling [employer] to inquire about status of financial review needed to complete waiver application  Pt was supposed to receive a call by yesterday from [facility] and this did not happen  Call was made to [facility] and we were informed a decision on financial review has not been made  [facility] informed we did not receive call promised by CW to discuss status  We were informed a CW will call pt within 48 hours  Ticket number is [phone]  Pt thanked [facility] for her time  Next outreach is scheduled for 3/10/2023

## 2023-03-08 ENCOUNTER — HOME CARE VISIT (OUTPATIENT)
Dept: HOME HEALTH SERVICES | Facility: HOME HEALTHCARE | Age: 63
End: 2023-03-08

## 2023-03-08 VITALS
SYSTOLIC BLOOD PRESSURE: 138 MMHG | HEART RATE: 71 BPM | OXYGEN SATURATION: 97 % | RESPIRATION RATE: 18 BRPM | DIASTOLIC BLOOD PRESSURE: 60 MMHG | TEMPERATURE: 96.8 F

## 2023-03-09 NOTE — CASE COMMUNICATION
Home Health need continues for: disease process management, teaching pt's daughter care and medication management  Current level of functional ability: requires assistance for daughter to complete tasks  Homebound status and living arrangements: high fall risk and decreased balance and endurance  Goals accomplished and/or measurable progress toward unmet goals in past 60 days: for daughter to learn her medications and become her paid ca regiver  Focus of care for next 60 days for each discipline ordered: teaching pt's daughter medication management, when to report changes, disease process, bp management  Skin integrity/wound status: rash in abdominal fold  Code status:   Most recent fall risk: high

## 2023-03-10 ENCOUNTER — PATIENT OUTREACH (OUTPATIENT)
Dept: FAMILY MEDICINE CLINIC | Facility: CLINIC | Age: 63
End: 2023-03-10

## 2023-03-10 DIAGNOSIS — N39.0 URINARY TRACT INFECTION WITHOUT HEMATURIA, SITE UNSPECIFIED: Primary | ICD-10-CM

## 2023-03-10 DIAGNOSIS — F11.20 CONTINUOUS OPIOID DEPENDENCE (HCC): ICD-10-CM

## 2023-03-10 DIAGNOSIS — G89.4 CHRONIC PAIN SYNDROME: ICD-10-CM

## 2023-03-10 RX ORDER — CEPHALEXIN 500 MG/1
500 CAPSULE ORAL EVERY 12 HOURS SCHEDULED
Qty: 14 CAPSULE | Refills: 0 | Status: SHIPPED | OUTPATIENT
Start: 2023-03-10 | End: 2023-03-17

## 2023-03-10 NOTE — PROGRESS NOTES
I received a call from the patient stating she believes she is having another UTI; note sent to PCP  The patient reports a feeling of urgency and burning  She notes her catheter is draining yellow urine with sediment  She denies any fever or hematuria  The patient states she has been eating and drinking normally  She denies her symptoms presenting like a yeast infection  She reports her blood sugar from yesterday was 128 and notes all have been <200  The patient will be leaving for dialysis in a few hours  I informed her if she does not hear back from me to inform the staff during her session  The patient requested med refill; submitted to PCP  I will continue to follow

## 2023-03-10 NOTE — PROGRESS NOTES
Outgoing Call:  3/10/2023    [facility] called pt to discuss status of financial review with [employer] for waiver services  Pt informed she did receive a call from Dalia and he requested proof of bank statements from Nov 2022 to present  He also requested a letter from pt stating that she does not own any property  Pt informed her daughter, Get Arizmendi, did download statements and would like to email to [facility] so that it can be forwarded to [facility]  CMOC provided email and Get Arizmendi agreed to send today  CMOC to forward to [employer] once received  Next outreach is scheduled for 3/13/2023

## 2023-03-11 DIAGNOSIS — I10 PRIMARY HYPERTENSION: ICD-10-CM

## 2023-03-11 DIAGNOSIS — I10 HYPERTENSION, UNSPECIFIED TYPE: ICD-10-CM

## 2023-03-13 ENCOUNTER — HOME CARE VISIT (OUTPATIENT)
Dept: HOME HEALTH SERVICES | Facility: HOME HEALTHCARE | Age: 63
End: 2023-03-13

## 2023-03-13 ENCOUNTER — PATIENT OUTREACH (OUTPATIENT)
Dept: FAMILY MEDICINE CLINIC | Facility: CLINIC | Age: 63
End: 2023-03-13

## 2023-03-13 VITALS
RESPIRATION RATE: 20 BRPM | DIASTOLIC BLOOD PRESSURE: 80 MMHG | HEART RATE: 84 BPM | OXYGEN SATURATION: 97 % | TEMPERATURE: 98.3 F | SYSTOLIC BLOOD PRESSURE: 152 MMHG

## 2023-03-13 RX ORDER — OXYCODONE HYDROCHLORIDE 5 MG/1
5 TABLET ORAL EVERY 8 HOURS PRN
Qty: 90 TABLET | Refills: 0 | Status: SHIPPED | OUTPATIENT
Start: 2023-03-13

## 2023-03-13 RX ORDER — HYDRALAZINE HYDROCHLORIDE 25 MG/1
25 TABLET, FILM COATED ORAL 3 TIMES DAILY
Qty: 90 TABLET | Refills: 0 | Status: SHIPPED | OUTPATIENT
Start: 2023-03-13

## 2023-03-13 RX ORDER — ISOSORBIDE MONONITRATE 30 MG/1
30 TABLET, EXTENDED RELEASE ORAL EVERY EVENING
Qty: 30 TABLET | Refills: 0 | Status: SHIPPED | OUTPATIENT
Start: 2023-03-13

## 2023-03-13 NOTE — PROGRESS NOTES
Email:  3/13/2023    Seth Sauceda received a call from pt to inform she emailed bank statements and letter needed to complete financial review for waiver services  [facility] Britta Sauceda forwarded to [facility] for review  Next outreach is scheduled for  3/17/2023

## 2023-03-14 ENCOUNTER — HOME CARE VISIT (OUTPATIENT)
Dept: HOME HEALTH SERVICES | Facility: HOME HEALTHCARE | Age: 63
End: 2023-03-14

## 2023-03-15 ENCOUNTER — HOSPITAL ENCOUNTER (OUTPATIENT)
Dept: NON INVASIVE DIAGNOSTICS | Facility: CLINIC | Age: 63
Discharge: HOME | End: 2023-03-15

## 2023-03-15 DIAGNOSIS — N18.6 ESRD (END STAGE RENAL DISEASE) (HCC): ICD-10-CM

## 2023-03-17 ENCOUNTER — PATIENT OUTREACH (OUTPATIENT)
Dept: FAMILY MEDICINE CLINIC | Facility: CLINIC | Age: 63
End: 2023-03-17

## 2023-03-18 NOTE — PROGRESS NOTES
Outgoing Call:  3/17/2023    [facility] completed chart review and checked on status of pt's waiver application via [place] [employer] website  Status states pt is now approved and will receive a call from her   CMOC called pt and informed her of this  [facility] reminded pt she will receive a call from [facility] agency to schedule a time to complete final evaluation to determine how many waiver hours pt will be approved for  Pt expressed understanding  [facility] informed she will call pt on Monday to assist in calling [employer] and request information on [place] agency to begin process of ordering [employer]  Pt thanked [facility] for her time  Next outreach is scheduled for 3/20/2023

## 2023-03-20 ENCOUNTER — PATIENT OUTREACH (OUTPATIENT)
Dept: FAMILY MEDICINE CLINIC | Facility: CLINIC | Age: 63
End: 2023-03-20

## 2023-03-20 NOTE — PROGRESS NOTES
Outgoing Call:  3/20/2023    [facility] called pt to discuss status of waiver services  Pt stated that she did receive a call from her new  and appointment was scheduled for 3/27/23 at 1:30PM via phone to determine how many waiver hours pt will be approved for  Pt is unsure of name of [place] or agency  [facility] requested pt provide this information at next outreach to include in pt chart  Pt agreed  [facility] informed pt of what to expect with upcoming evaluation with [place] and pt expressed understanding  CMOC informed pt [place] will be able to begin [id number] for her  Pt is happy about this  Next outreach is scheduled for 3/27/2023

## 2023-03-22 ENCOUNTER — HOME CARE VISIT (OUTPATIENT)
Dept: HOME HEALTH SERVICES | Facility: HOME HEALTHCARE | Age: 63
End: 2023-03-22

## 2023-03-23 VITALS — RESPIRATION RATE: 18 BRPM

## 2023-03-24 ENCOUNTER — PATIENT OUTREACH (OUTPATIENT)
Dept: FAMILY MEDICINE CLINIC | Facility: CLINIC | Age: 63
End: 2023-03-24

## 2023-03-24 DIAGNOSIS — E11.42 TYPE 2 DIABETES MELLITUS WITH DIABETIC POLYNEUROPATHY, WITH LONG-TERM CURRENT USE OF INSULIN (HCC): ICD-10-CM

## 2023-03-24 DIAGNOSIS — Z79.4 TYPE 2 DIABETES MELLITUS WITH DIABETIC POLYNEUROPATHY, WITH LONG-TERM CURRENT USE OF INSULIN (HCC): ICD-10-CM

## 2023-03-24 RX ORDER — DULAGLUTIDE 1.5 MG/.5ML
0.5 INJECTION, SOLUTION SUBCUTANEOUS WEEKLY
Qty: 6 ML | Refills: 2 | Status: SHIPPED | OUTPATIENT
Start: 2023-03-24

## 2023-03-24 NOTE — PROGRESS NOTES
I called the patient to follow up  She states she is feeling better after having a recent UTI  The patient notes her catheter is draining clear, yellow urine but reported seeing blood in her bag yesterday  The patient is aware of her Uro appointment on Monday, 3/27  The patient noted she had an appointment with [facility] Transplant Surgery in hopes of getting a new kidney  The patient reports her blood sugars have been stable, <150  I reviewed her DM meds and she states she is taking them as prescribed except for Trulicity which she ran out of a few weeks ago and forgot to get the refill; request sent to PCP  The patient states her feet are being checked daily and denies any skin breakdown  She states she is watching her diet/carbs  The patient states her daughter has been checking the patient's blood pressure; no readings available but was told they are normal   The patient states her BP runs low on dialysis days  She also notes she has been taking hydralazine usually only once a day  The patient agreed for me to give her reminder call for her PCP appointment in a few weeks  I will continue to follow

## 2023-03-27 ENCOUNTER — TELEPHONE (OUTPATIENT)
Dept: UROLOGY | Facility: AMBULATORY SURGERY CENTER | Age: 63
End: 2023-03-27

## 2023-03-27 NOTE — TELEPHONE ENCOUNTER
Pt under care of Kevan Waddell    PT called and left VM at 10:22am on 3/27/23 stating she went to the incorrect urology office and needs to r/s appt for SPT exchange     Pt call [phone]

## 2023-03-27 NOTE — TELEPHONE ENCOUNTER
Contacted the patient to reschedule her SPT exchange  Patient scheduled 3/31/2023 at 1300 at the [facility] office  Office address given to patient

## 2023-03-28 DIAGNOSIS — I10 PRIMARY HYPERTENSION: ICD-10-CM

## 2023-03-28 RX ORDER — FUROSEMIDE 80 MG
80 TABLET ORAL DAILY
Qty: 90 TABLET | Refills: 3 | Status: SHIPPED | OUTPATIENT
Start: 2023-03-28

## 2023-03-29 ENCOUNTER — PATIENT OUTREACH (OUTPATIENT)
Dept: FAMILY MEDICINE CLINIC | Facility: CLINIC | Age: 63
End: 2023-03-29

## 2023-03-29 NOTE — PROGRESS NOTES
I received a call from the patient stating her  just left her home  The patient is requesting a refill of gabapentin; chart reviewed and it is not on her med list   The patient notes it has been awhile since she was taking this (last time when [place] was [place])  Note sent to PCP asking to refill and add to blister packed meds  I reviewed upcoming appointments with the patient: Uro 3/31, Transplant and PCP 4/5  I will continue to follow

## 2023-03-31 ENCOUNTER — PATIENT OUTREACH (OUTPATIENT)
Dept: FAMILY MEDICINE CLINIC | Facility: CLINIC | Age: 63
End: 2023-03-31

## 2023-03-31 ENCOUNTER — HOME CARE VISIT (OUTPATIENT)
Dept: HOME HEALTH SERVICES | Facility: HOME HEALTHCARE | Age: 63
End: 2023-03-31

## 2023-03-31 ENCOUNTER — PROCEDURE VISIT (OUTPATIENT)
Dept: UROLOGY | Facility: CLINIC | Age: 63
End: 2023-03-31

## 2023-03-31 VITALS
DIASTOLIC BLOOD PRESSURE: 60 MMHG | HEART RATE: 64 BPM | WEIGHT: 251 LBS | RESPIRATION RATE: 20 BRPM | HEIGHT: 68 IN | SYSTOLIC BLOOD PRESSURE: 140 MMHG | BODY MASS INDEX: 38.04 KG/M2

## 2023-03-31 DIAGNOSIS — R33.9 URINARY RETENTION: Primary | ICD-10-CM

## 2023-03-31 NOTE — PROGRESS NOTES
I returned the patient's call  She wanted to confirm upcoming appointments  I informed her she has a Uro appointment today at 1pm and that I will call her next week to remind her of her PCP appointment

## 2023-03-31 NOTE — PROGRESS NOTES
"3/31/2023    Kim Han  1960  [id number]    Diagnosis  Chief Complaint    Urinary Retention         Patient presents for initial SPT change managed by Dr Ray Silva scheduled appointment on 5/8/2023 for cystoscopy with Dr Isra Lopez to resume SPT exchanges  Await urine culture results  Procedure: Suprapubic Tube Change         Cystostomy tube change     Date/Time 3/31/2023 2:02 PM     Performed by  Tanya Boyer RN     Authorized by Latisha Marrufo MD      Universal Protocol   Consent: Verbal consent obtained  Consent given by: patient       Procedure Details   Patient tolerance: patient tolerated the procedure well with no immediate complications               Current catheter removed without difficulty after deflation of an intact balloon  Site prepped with Hibiclens, new 16F  suprapubic spt change via aseptic technique without incident, 10 ml balloon inflated with sterile water  irrigated easily for pink-tinged return, mild spasm noted  Patient tolerated well  Attached to drainage bag  Patient complaining of hematuria and the feeling she needs to urinate always  No fever/chills  No flank pain  Urine culture obtained from the new SPT    Vitals:    03/31/23 1316   BP: 140/60   Pulse: 64   Resp: 20   Weight: 114 kg (251 lb)   Height: 5' 8\" (1 727 m)         Tanya Boyer RN  "

## 2023-04-02 LAB — BACTERIA UR CULT: ABNORMAL

## 2023-04-03 ENCOUNTER — HOME CARE VISIT (OUTPATIENT)
Dept: HOME HEALTH SERVICES | Facility: HOME HEALTHCARE | Age: 63
End: 2023-04-03

## 2023-04-04 ENCOUNTER — HOSPITAL ENCOUNTER (OUTPATIENT)
Dept: RADIOLOGY | Facility: HOSPITAL | Age: 63
Discharge: HOME | End: 2023-04-04
Admitting: RADIOLOGY

## 2023-04-04 DIAGNOSIS — Z99.2 ESRD (END STAGE RENAL DISEASE) ON DIALYSIS (HCC): Primary | ICD-10-CM

## 2023-04-04 DIAGNOSIS — N18.6 ESRD (END STAGE RENAL DISEASE) ON DIALYSIS (HCC): Primary | ICD-10-CM

## 2023-04-04 DIAGNOSIS — Z99.2 HEMODIALYSIS PATIENT (HCC): ICD-10-CM

## 2023-04-04 RX ADMIN — IOHEXOL 4 ML: 350 INJECTION, SOLUTION INTRAVENOUS at 15:30

## 2023-04-04 NOTE — SEDATION DOCUMENTATION
Patient states she was at Dialysis this afternoon when her permcath became dislodged and was then removed the rest of the way by Dr Michael Calderon  She presents today to have it replaced her in IR

## 2023-04-04 NOTE — BRIEF OP NOTE (RAD/CATH)
INTERVENTIONAL RADIOLOGY PROCEDURE NOTE    Date: 4/4/2023    Procedure:   Procedure Summary     Date: 04/04/23 Room / Location: [address] [place] [address] Interventional Radiology    Anesthesia Start:  Anesthesia Stop:     Procedure: IR TUNNELED CENTRAL LINE PLACEMENT Diagnosis:       Hemodialysis patient ([address] )      (Central line fell out)    Scheduled Providers:  Responsible Provider:     Anesthesia Type: Not recorded ASA Status: Not recorded          Preoperative diagnosis:   1  Hemodialysis patient [facility])         Postoperative diagnosis: Same  Surgeon: Lucy Treadwell MD     Assistant: None  No qualified resident was available  Blood loss: none    Specimens: none     Findings: 24 cm tunneled dialysis catheter replaced through previous tract  Complications: None immediate      Anesthesia: local

## 2023-04-05 ENCOUNTER — PATIENT OUTREACH (OUTPATIENT)
Dept: FAMILY MEDICINE CLINIC | Facility: CLINIC | Age: 63
End: 2023-04-05

## 2023-04-05 ENCOUNTER — OFFICE VISIT (OUTPATIENT)
Dept: FAMILY MEDICINE CLINIC | Facility: CLINIC | Age: 63
End: 2023-04-05

## 2023-04-05 VITALS
OXYGEN SATURATION: 98 % | HEART RATE: 82 BPM | WEIGHT: 248.2 LBS | BODY MASS INDEX: 37.62 KG/M2 | HEIGHT: 68 IN | SYSTOLIC BLOOD PRESSURE: 138 MMHG | DIASTOLIC BLOOD PRESSURE: 66 MMHG | TEMPERATURE: 97.1 F | RESPIRATION RATE: 18 BRPM

## 2023-04-05 DIAGNOSIS — N18.6 ESRD (END STAGE RENAL DISEASE) (HCC): ICD-10-CM

## 2023-04-05 DIAGNOSIS — F11.20 CONTINUOUS OPIOID DEPENDENCE (HCC): ICD-10-CM

## 2023-04-05 DIAGNOSIS — I10 PRIMARY HYPERTENSION: ICD-10-CM

## 2023-04-05 DIAGNOSIS — Z99.2 DEPENDENCE ON RENAL DIALYSIS (HCC): ICD-10-CM

## 2023-04-05 DIAGNOSIS — G89.4 CHRONIC PAIN SYNDROME: ICD-10-CM

## 2023-04-05 DIAGNOSIS — Z79.4 TYPE 2 DIABETES MELLITUS WITH DIABETIC POLYNEUROPATHY, WITH LONG-TERM CURRENT USE OF INSULIN (HCC): Primary | ICD-10-CM

## 2023-04-05 DIAGNOSIS — E11.42 TYPE 2 DIABETES MELLITUS WITH DIABETIC POLYNEUROPATHY, WITH LONG-TERM CURRENT USE OF INSULIN (HCC): Primary | ICD-10-CM

## 2023-04-05 DIAGNOSIS — G89.29 CHRONIC LEFT SHOULDER PAIN: ICD-10-CM

## 2023-04-05 DIAGNOSIS — M10.9 GOUT, UNSPECIFIED CAUSE, UNSPECIFIED CHRONICITY, UNSPECIFIED SITE: ICD-10-CM

## 2023-04-05 DIAGNOSIS — T14.8XXA BLISTER: ICD-10-CM

## 2023-04-05 DIAGNOSIS — I50.42 CHRONIC COMBINED SYSTOLIC AND DIASTOLIC CONGESTIVE HEART FAILURE (HCC): ICD-10-CM

## 2023-04-05 DIAGNOSIS — M25.512 CHRONIC LEFT SHOULDER PAIN: ICD-10-CM

## 2023-04-05 DIAGNOSIS — M17.12 PRIMARY OSTEOARTHRITIS OF LEFT KNEE: ICD-10-CM

## 2023-04-05 DIAGNOSIS — N39.0 URINARY TRACT INFECTION WITHOUT HEMATURIA, SITE UNSPECIFIED: ICD-10-CM

## 2023-04-05 LAB
SL AMB  POCT GLUCOSE, UA: NORMAL
SL AMB LEUKOCYTE ESTERASE,UA: ABNORMAL
SL AMB POCT BILIRUBIN,UA: ABNORMAL
SL AMB POCT BLOOD,UA: ABNORMAL
SL AMB POCT CLARITY,UA: ABNORMAL
SL AMB POCT COLOR,UA: ABNORMAL
SL AMB POCT HEMOGLOBIN AIC: 8.1 (ref ?–6.5)
SL AMB POCT KETONES,UA: ABNORMAL
SL AMB POCT NITRITE,UA: ABNORMAL
SL AMB POCT PH,UA: 5
SL AMB POCT SPECIFIC GRAVITY,UA: 1.02
SL AMB POCT URINE PROTEIN: ABNORMAL
SL AMB POCT UROBILINOGEN: NORMAL

## 2023-04-05 RX ORDER — LIDOCAINE 40 MG/G
CREAM TOPICAL AS NEEDED
Qty: 30 G | Refills: 0 | Status: SHIPPED | OUTPATIENT
Start: 2023-04-05

## 2023-04-05 RX ORDER — NALOXONE HYDROCHLORIDE 4 MG/.1ML
SPRAY NASAL
Qty: 1 EACH | Refills: 1 | Status: SHIPPED | OUTPATIENT
Start: 2023-04-05

## 2023-04-05 RX ORDER — CEPHALEXIN 500 MG/1
500 CAPSULE ORAL EVERY 12 HOURS SCHEDULED
Qty: 14 CAPSULE | Refills: 0 | Status: SHIPPED | OUTPATIENT
Start: 2023-04-05 | End: 2023-04-12

## 2023-04-05 RX ORDER — ALLOPURINOL 200 MG/1
200 TABLET ORAL DAILY
Qty: 90 TABLET | Refills: 0 | Status: SHIPPED | OUTPATIENT
Start: 2023-04-05

## 2023-04-05 RX ORDER — OXYCODONE HYDROCHLORIDE 5 MG/1
7.5 TABLET ORAL EVERY 8 HOURS PRN
Qty: 90 TABLET | Refills: 0 | Status: SHIPPED | OUTPATIENT
Start: 2023-04-05

## 2023-04-05 NOTE — PROGRESS NOTES
I called the patient to remind her of 2 appointments for today:  [facility] Transplant at 1pm and PCP at 4pm   The patient will follow up with UTI concerns with PCP later today  I will continue to follow

## 2023-04-05 NOTE — PROGRESS NOTES
Outgoing Call:  4/5/2023    [facility] called pt to discuss status of waiver  Pt had appointment scheduled with her  last week to determine hours she will be eligible for  VM left  Next outreach is scheduled for 4/12/2023
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
## 2023-04-07 VITALS
HEART RATE: 62 BPM | DIASTOLIC BLOOD PRESSURE: 70 MMHG | RESPIRATION RATE: 16 BRPM | OXYGEN SATURATION: 93 % | SYSTOLIC BLOOD PRESSURE: 132 MMHG

## 2023-04-12 ENCOUNTER — SOCIAL WORK (OUTPATIENT)
Dept: PALLIATIVE MEDICINE | Facility: CLINIC | Age: 63
End: 2023-04-12

## 2023-04-12 DIAGNOSIS — Z71.89 COUNSELING AND COORDINATION OF CARE: Primary | ICD-10-CM

## 2023-04-24 NOTE — PROGRESS NOTES
"[facility] CANDACE met with patient and her son in law to introduce the roll of [facility] CANDACE in her care/treatment  Patient was given the opportunity to have their questions/concerns addressed  [facility] CANDACE encouraged patient to reach out to  as needed for support and/or resources as needed  Palliative Outpatient Assessment of Need    MSW completed an assessment of need which was completed with patient in the office     Relationship status:     Duration of relationship:     Name of significant other:N/A    Children and Ages: The pt has an adult daughter and an adult son that resides in [address] [place] [address]    Other important family information:    Living situation (where and whom): The pt resides in [address] located on the second floor ([address]  with her daughter and son-n- law     Patient's primary caregiver:  Daughter and neighbors    Any limitations of caregiver:none    Environmental concerns or barriers:none     history:none    Employment history/source of income: SSD    Disability:  Yes    Concerns regarding literacy: none    Spirituality/ Synagogue:  The pt believes in God    Patient's strengths, social supports, and resources:daughter and neighbors, son-n-law and , Mari Arreola    Cultural information:     Mental Health current or previous:Depression but the pt states when she takes her medication she feels, \"good\"    Substance use or history: The pt used to smoke cigarettes 1-2 Paks per day over 20 years ago    Sleep: The pt states she sleeps well about 6-8 hours    Exercise:Walking    Diet/nutrition:The pt states she has a good appetite  She states she has 2 meals per day and a snack  MOMS meals    Durable Medical Equipment needs:the pt uses a walker and a cane  Life Alert has been ordered    Transportation:[facility]/ or her son-n-law    Financial concerns:none at this time    Advanced Directive: The pt has identified her daughter as her POA   A copy of her AD is in the " chart    Other medical or social work providers involved: The pt has an OPCM, [employer]    Patient/caregiver current level of coping:pt is depressed     Understanding:fair    Patient/family concerns and areas of need: The pt states her medication addresses her depression  The pt is declining information about therapy  Patient's interests: the pt has applied waiver services with [employer]  The pt daughter would like 24 hour care for the pt    I have spent 35 minutes with Patient and son-n-law today in which greater than 50% of this time was spent in counseling/coordination of care regarding ongoing emotional support  *All questions may not be answered due to constraints    Follow-up discussions may need to occur

## 2023-04-25 DIAGNOSIS — E83.39 HYPERPHOSPHATEMIA: Primary | ICD-10-CM

## 2023-04-25 RX ORDER — SEVELAMER CARBONATE 800 MG/1
1600 TABLET, FILM COATED ORAL
Qty: 540 TABLET | Refills: 4 | Status: SHIPPED | OUTPATIENT
Start: 2023-04-25

## 2023-04-26 ENCOUNTER — TELEPHONE (OUTPATIENT)
Dept: VASCULAR SURGERY | Facility: CLINIC | Age: 63
End: 2023-04-26

## 2023-04-26 ENCOUNTER — HOSPITAL ENCOUNTER (OUTPATIENT)
Dept: RADIOLOGY | Facility: HOSPITAL | Age: 63
Discharge: HOME | End: 2023-04-26

## 2023-04-26 ENCOUNTER — HOSPITAL ENCOUNTER (OUTPATIENT)
Dept: INTERVENTIONAL RADIOLOGY/VASCULAR | Facility: HOSPITAL | Age: 63
Discharge: HOME | End: 2023-04-26
Attending: STUDENT IN AN ORGANIZED HEALTH CARE EDUCATION/TRAINING PROGRAM

## 2023-04-26 VITALS
DIASTOLIC BLOOD PRESSURE: 67 MMHG | BODY MASS INDEX: 36.68 KG/M2 | TEMPERATURE: 98.3 F | OXYGEN SATURATION: 95 % | WEIGHT: 242 LBS | HEIGHT: 68 IN | RESPIRATION RATE: 18 BRPM | SYSTOLIC BLOOD PRESSURE: 125 MMHG | HEART RATE: 80 BPM

## 2023-04-26 DIAGNOSIS — M25.561 ACUTE PAIN OF BOTH KNEES: ICD-10-CM

## 2023-04-26 DIAGNOSIS — N18.6 ESRD (END STAGE RENAL DISEASE) (HCC): ICD-10-CM

## 2023-04-26 DIAGNOSIS — M25.512 CHRONIC LEFT SHOULDER PAIN: ICD-10-CM

## 2023-04-26 DIAGNOSIS — G89.29 CHRONIC LEFT SHOULDER PAIN: ICD-10-CM

## 2023-04-26 DIAGNOSIS — M25.562 ACUTE PAIN OF BOTH KNEES: ICD-10-CM

## 2023-04-26 LAB — GLUCOSE SERPL-MCNC: 129 MG/DL (ref 65–140)

## 2023-04-26 RX ORDER — LIDOCAINE HYDROCHLORIDE 10 MG/ML
INJECTION, SOLUTION EPIDURAL; INFILTRATION; INTRACAUDAL; PERINEURAL AS NEEDED
Status: COMPLETED | OUTPATIENT
Start: 2023-04-26 | End: 2023-04-26

## 2023-04-26 RX ORDER — MIDAZOLAM HYDROCHLORIDE 2 MG/2ML
INJECTION, SOLUTION INTRAMUSCULAR; INTRAVENOUS AS NEEDED
Status: COMPLETED | OUTPATIENT
Start: 2023-04-26 | End: 2023-04-26

## 2023-04-26 RX ORDER — FENTANYL CITRATE 50 UG/ML
INJECTION, SOLUTION INTRAMUSCULAR; INTRAVENOUS AS NEEDED
Status: COMPLETED | OUTPATIENT
Start: 2023-04-26 | End: 2023-04-26

## 2023-04-26 RX ADMIN — FENTANYL CITRATE 50 MCG: 50 INJECTION, SOLUTION INTRAMUSCULAR; INTRAVENOUS at 11:05

## 2023-04-26 RX ADMIN — FENTANYL CITRATE 50 MCG: 50 INJECTION, SOLUTION INTRAMUSCULAR; INTRAVENOUS at 11:09

## 2023-04-26 RX ADMIN — LIDOCAINE HYDROCHLORIDE 2 ML: 10 INJECTION, SOLUTION EPIDURAL; INFILTRATION; INTRACAUDAL; PERINEURAL at 10:52

## 2023-04-26 RX ADMIN — MIDAZOLAM 1 MG: 1 INJECTION INTRAMUSCULAR; INTRAVENOUS at 11:05

## 2023-04-26 RX ADMIN — LIDOCAINE HYDROCHLORIDE 1 ML: 10 INJECTION, SOLUTION EPIDURAL; INFILTRATION; INTRACAUDAL; PERINEURAL at 11:01

## 2023-04-26 NOTE — DISCHARGE INSTRUCTIONS
Fistulagram   WHAT YOU NEED TO KNOW:   Your arm or leg my  be sore, swollen, and bruised after the procedure  This is normal and should get better in a few days  DISCHARGE INSTRUCTIONS:     Contact Interventional Radiology at [phone] and follow prompts if you experience any:    You have a fever or chills  Your puncture site is red, swollen, or draining pus  You have nausea or are vomiting  Your skin is itchy, swollen, or you have a rash  You cannot feel a thrill over your graft or fistula  You have questions or concerns about your condition or care  Seek care immediately if:     You have bleeding that does not stop after 10 minutes of holding firm, direct pressure over the puncture site  Blood soaks through your bandage  Your hand or foot closest to the graft or fistula feels cold, painful, or numb  Your hand or foot closest to the graft or fistula is pale or blue  You have trouble moving your arm or leg closest to the graft or fistula  Your bruise suddenly gets bigger  Care for your wound as directed:  Remove the bandage in 4 to 6 hours or as         directed  Wash the area once a day with soap and water  Gently pat the area dry  Apply firm, steady pressure to the puncture site if it bleeds  Use a clean gauze or towel to hold pressure for 10 to 15 minutes  Call 911 if you cannot stop the bleeding or the bleeding gets heavier  Feel for a thrill once a day or as directed  Place your index and second finger over your fistula or graft as directed  You should feel a vibration  The vibration means that blood is flowing through your graft or fistula correctly  Rest your arm or leg as directed  Do not lift anything heavier than 5 pounds or do strenuous activity for 24 hours  Prevent damage to your graft or fistula  Do not wear tight-fitting clothing over your graft or fistula  Do not wear tight jewelry on the arm or leg with the graft or fistula   Tell healthcare providers not to do, IVs, blood draws, and blood pressure readings in the arm with your graft or fistula  Do not allow flu shots or vaccinations in your arm with your graft or fistula  Follow up with your healthcare provider as directed Procedural Sedation   WHAT YOU NEED TO KNOW:   Procedural sedation is medicine used during procedures to help you feel relaxed and calm  You will remember little to none of the procedure  After sedation you may feel tired, weak, or unsteady on your feet  You may also have trouble concentrating or short-term memory loss  These symptoms should go away in 24 hours or less  DISCHARGE INSTRUCTIONS:     Call 911 or have someone else call for any of the following: You have sudden trouble breathing  You cannot be woken  Contact Interventional Radiology at [phone]   Alonso PATIENTS: Contact Interventional Radiology at [phone]) Alanis Mendez PATIENTS: Contact Interventional Radiology at [phone]) if any of the following occur: You have a severe headache or dizziness  Your heart is beating faster than usual     You have a fever or chills  Your skin is itchy, swollen, or you have a rash  You have nausea or are vomiting for more than 8 hours after the procedure  You have questions or concerns about your condition or care  Self-care:   Have someone stay with you for 24 hours  This person can drive you to errands and help you do things around the house  This person can also watch for problems  Rest and do quiet activities for 24 hours  Do not exercise, ride a bike, or play sports  Stand up slowly to prevent dizziness and falls  Take short walks around the house with another person  Slowly return to your usual activities the next day  Do not drive or use dangerous machines or tools for 24 hours  You may injure yourself or others  Examples include a lawnmower, saw, or drill   Do not return to work for 24 hours if you use dangerous machines or tools for work  Do not make important decisions for 24 hours  For example, do not sign important papers or invest money  Drink liquids as directed  Liquids help flush the sedation medicine out of your body  Ask how much liquid to drink each day and which liquids are best for you  Eat small, frequent meals to prevent nausea and vomiting  Start with clear liquids such as juice or broth  If you do not vomit after clear liquids, you can eat your usual foods  Do not drink alcohol or take medicines that make you drowsy  This includes medicines that help you sleep and anxiety medicines  Ask your healthcare provider if it is safe for you to take pain medicine  Follow up with your healthcare provider as directed: Write down your questions so you remember to ask them during your visits  Procedural Sedation   WHAT YOU NEED TO KNOW:   Procedural sedation is medicine used during procedures to help you feel relaxed and calm  You will remember little to none of the procedure  After sedation you may feel tired, weak, or unsteady on your feet  You may also have trouble concentrating or short-term memory loss  These symptoms should go away in 24 hours or less  DISCHARGE INSTRUCTIONS:     Call 911 or have someone else call for any of the following: You have sudden trouble breathing  You cannot be woken  Contact Interventional Radiology at [phone]   Alonso PATIENTS: Contact Interventional Radiology at [phone]) Sarah Jenkins PATIENTS: Contact Interventional Radiology at [phone]) if any of the following occur: You have a severe headache or dizziness  Your heart is beating faster than usual     You have a fever or chills  Your skin is itchy, swollen, or you have a rash  You have nausea or are vomiting for more than 8 hours after the procedure  You have questions or concerns about your condition or care  Self-care:   Have someone stay with you for 24 hours   This person can drive you to errands and help you do things around the house  This person can also watch for problems  Rest and do quiet activities for 24 hours  Do not exercise, ride a bike, or play sports  Stand up slowly to prevent dizziness and falls  Take short walks around the house with another person  Slowly return to your usual activities the next day  Do not drive or use dangerous machines or tools for 24 hours  You may injure yourself or others  Examples include a lawnmower, saw, or drill  Do not return to work for 24 hours if you use dangerous machines or tools for work  Do not make important decisions for 24 hours  For example, do not sign important papers or invest money  Drink liquids as directed  Liquids help flush the sedation medicine out of your body  Ask how much liquid to drink each day and which liquids are best for you  Eat small, frequent meals to prevent nausea and vomiting  Start with clear liquids such as juice or broth  If you do not vomit after clear liquids, you can eat your usual foods  Do not drink alcohol or take medicines that make you drowsy  This includes medicines that help you sleep and anxiety medicines  Ask your healthcare provider if it is safe for you to take pain medicine  Follow up with your healthcare provider as directed: Write down your questions so you remember to ask them during your visits

## 2023-04-26 NOTE — TELEPHONE ENCOUNTER
From: Klaudia Robert <[email or web address]   Sent: Wednesday, April 26, 2023 11:47 AM  To: Lily Salazar <[email or web address]; Aida Parker <[email or web address]  Cc: Gurmeet Pina <[email or web address]  Subject: [EXTERNAL] mutual pt BK details    WARNING! Based upon the critical issue with ransomware targeting Healthcare systems ALL attachments and links from this email should be heavily scrutinized  Hi Kamlesh Arguello,    I was at [facility] IR with mutual patient Shantel Fent [birth year]  Her AVF is deep and will need superficialization by Dr Simi Stewart  She is [facility] at [facility]  She had labs sent yesterday and I will forward the results by Friday  Just wanted to put her on your radar      Thanks,  Kem Peguero

## 2023-04-26 NOTE — BRIEF OP NOTE (RAD/CATH)
IR AV FISTULAGRAM/GRAFTOGRAM  Procedure Note    PATIENT NAME: Xenia Fitzgerald  : 1960  MRN: [id number]     Pre-op Diagnosis:   1  [facility]      Post-op Diagnosis:   1  ESRD (end stage renal disease) [facility])        Surgeon:   Lazaro Clay DO  Assistants:     No qualified resident was available  Estimated Blood Loss: None  Findings:   Patent L BCF with some small collaterals, possible JA stensosis and outflow vein depth roughly 12 mm near the venous cannulation needle  6 mm POBA across with JA/AA      Specimens: none    Complications:  none    Anesthesia: conscious sedation and local    Lazaro Clay DO     Date: 2023  Time: 11:32 AM

## 2023-04-26 NOTE — NURSING NOTE
Woggle removed by IR staff member  Dressing dry and intact  No bleeding or hematoma  Positive bruit/thrill

## 2023-04-27 ENCOUNTER — TELEPHONE (OUTPATIENT)
Dept: FAMILY MEDICINE CLINIC | Facility: CLINIC | Age: 63
End: 2023-04-27

## 2023-04-27 NOTE — TELEPHONE ENCOUNTER
From: Marcus Hernández <[email or web address]   Sent: Thursday, April 27, 2023 3:56 PM  To: Eddie Russo <[email or web address]; Lily Salazar <[email or web address]; Aida Parker <[email or web address]  Cc: Lazaro Servin <[email or web address]; Anita Pelaez <[email or web address]; Michael Acevedo <[email or web address]  Subject: RE: Amna Eagle mutual pt BK details    She only had 1 cannulation 4/13 resulting in a small bruise - no hematoma  I changed her to CVC only after that tx on 4/13  The only other access was from the IR sheath yesterday 4/26  She already left dialysis so I cannot send you a picture of her arm  I can send a picture on Monday if that would expedite this process      Thanks,  [employer]

## 2023-04-27 NOTE — TELEPHONE ENCOUNTER
Pt is requesting a refill on her oxy I let her know she is not due for a refill, I asked how much she was taking a day and she stated 2-3

## 2023-04-27 NOTE — TELEPHONE ENCOUNTER
From: Evelyn Cota <[email or web address]   Sent: Thursday, April 27, 2023 2:37 PM  To: Alpha Dakin <[email or web address]  Cc: [facility] [email or web address]  Subject: Re: [EXTERNAL] mutual pt BK details    I would like to see what her arm looks like…if there is lots of bruising from any recent infiltration then right now wouldn’t be best time to proceed as it risks further injury to the fistula     Email sent to Call Center to set up an office visit with Dr Clarke Freshwater

## 2023-04-28 ENCOUNTER — PATIENT OUTREACH (OUTPATIENT)
Dept: FAMILY MEDICINE CLINIC | Facility: CLINIC | Age: 63
End: 2023-04-28

## 2023-04-28 NOTE — PROGRESS NOTES
I called the patient to follow up  She states she has been feeling well  The patient notes her blood sugars have been stable but could not recall any readings  She states she or [place] are checking her feet daily and deny and any skin breakdown  The patient states the blisters on her fingers are nearly completely healed  She states she is taking Lantus and humalog as prescribed  I encouraged her to keep a strict watch on her diet as her A1C has increased  The patient reports her blood pressures have been on the low side, not needing hydralazine  I asked that she have [place] record home BP readings to be reviewed  She states she was almost turned away at dialysis because her BP was borderline  The patient reports her weight is stable, ~240  She notes she has been trying to limit her fluid intake  The patient states her daughter will be starting soon as her HHA and will be working 2 jobs  The patient requested a refill of Oxycodone; I informed her the refill is due next week  She is also requesting a medication she took in the past; note sent to PCP  The patient is aware of the need to  Renvela ordered by Aracelis Bailey  I reminded the patient of her Transplant appointment on Monday, 5/1; she plans on attending  I will continue to follow

## 2023-04-28 NOTE — PROGRESS NOTES
I received a call from the patient stating she is in need of an appointment with her PCP due to having increased memory issues; note sent to clerical

## 2023-04-29 DIAGNOSIS — G89.4 CHRONIC PAIN SYNDROME: ICD-10-CM

## 2023-04-29 DIAGNOSIS — F11.20 CONTINUOUS OPIOID DEPENDENCE (HCC): ICD-10-CM

## 2023-04-29 PROBLEM — J06.9 VIRAL URI: Status: RESOLVED | Noted: 2023-02-28 | Resolved: 2023-04-29

## 2023-04-29 RX ORDER — OXYCODONE HYDROCHLORIDE 5 MG/1
7.5 TABLET ORAL EVERY 8 HOURS PRN
Qty: 120 TABLET | Refills: 0 | Status: SHIPPED | OUTPATIENT
Start: 2023-04-29 | End: 2023-05-09

## 2023-05-01 NOTE — TELEPHONE ENCOUNTER
"From: Yelena Cooper <Liliana  [email or web address]   Sent: Friday, April 28, 2023 4:37 PM  To: Minna Connell <Calogero  [email or web address]; Malachi Rg <[email or web address]  Cc: Mahendra Oshea <[email or web address]  [email or web address]; Teo Blanca <[email or web address]  [email or web address]; Justo Woods <[email or web address]; Martin, Lily <[email or web address]; Aida Parker <[email or web address]; Malachi Rg <Hoa  [email or web address]  Subject: RE: Johnny Redd pt BK details    Kary Navarro  I didnt think it was very bruised on the surface  I was only present for the angiography and missed the ultrasound portion, so maybe Cherelle Mcintyre saw trauma under the surface during ultrasound  Thanks for letting me know  Im all for holding off in that case  Thanks,  Liliana        From: Hans Kat <Calogero  [email or web address]   Sent: Friday, April 28, 2023 4:16 PM  To: Malachi Rg <[email or web address]  Cc: Mahendra Oshea <[email or web address]; Teo Blanca <[email or web address]  [email or web address]; Justo Woods <[email or web address]; V[facility] [email or web address]; Liliana Sosa <[email or web address]  [email or web address]  Subject: Re: Jodi santana pt BK details    The email I received from Dr Rachele Larios specifically said that Chris Hallman is quite a bit of bruising\"    I was just going by what I was emailed      Erich     "

## 2023-05-03 ENCOUNTER — HOME CARE VISIT (OUTPATIENT)
Dept: HOME HEALTH SERVICES | Facility: HOME HEALTHCARE | Age: 63
End: 2023-05-03

## 2023-05-05 VITALS
RESPIRATION RATE: 18 BRPM | OXYGEN SATURATION: 96 % | SYSTOLIC BLOOD PRESSURE: 162 MMHG | TEMPERATURE: 97.4 F | DIASTOLIC BLOOD PRESSURE: 68 MMHG | HEART RATE: 69 BPM

## 2023-05-05 DIAGNOSIS — F51.05 INSOMNIA SECONDARY TO ANXIETY: ICD-10-CM

## 2023-05-05 DIAGNOSIS — F41.9 INSOMNIA SECONDARY TO ANXIETY: ICD-10-CM

## 2023-05-06 NOTE — CASE COMMUNICATION
Home Health need continues for: sp catheter management  Current level of functional ability: requires assistance to ambulate and performs adls  Homebound status and living arrangements: has decreased balance, is a high fall risk, decreased endurance, and requires use of a walker and human assistance to ambulate  Goals accomplished and/or measurable progress toward unmet goals in past 60 days: ongoing management of sp catheter  Focus of  care for next 60 days for each discipline ordered: sp catheter changes every 4 to 6 weeks  Skin integrity/wound status: skin intact  Code status:   Most recent fall risk: high

## 2023-05-08 ENCOUNTER — PROCEDURE VISIT (OUTPATIENT)
Dept: UROLOGY | Facility: CLINIC | Age: 63
End: 2023-05-08

## 2023-05-08 ENCOUNTER — OFFICE VISIT (OUTPATIENT)
Dept: FAMILY MEDICINE CLINIC | Facility: CLINIC | Age: 63
End: 2023-05-08

## 2023-05-08 VITALS
RESPIRATION RATE: 16 BRPM | WEIGHT: 244 LBS | HEIGHT: 68 IN | HEART RATE: 79 BPM | DIASTOLIC BLOOD PRESSURE: 70 MMHG | BODY MASS INDEX: 36.98 KG/M2 | OXYGEN SATURATION: 97 % | TEMPERATURE: 98.7 F | SYSTOLIC BLOOD PRESSURE: 140 MMHG

## 2023-05-08 VITALS
SYSTOLIC BLOOD PRESSURE: 130 MMHG | HEIGHT: 68 IN | BODY MASS INDEX: 37.13 KG/M2 | HEART RATE: 70 BPM | DIASTOLIC BLOOD PRESSURE: 70 MMHG | WEIGHT: 245 LBS | OXYGEN SATURATION: 95 %

## 2023-05-08 DIAGNOSIS — G89.29 CHRONIC LEFT SHOULDER PAIN: ICD-10-CM

## 2023-05-08 DIAGNOSIS — M25.562 CHRONIC PAIN OF BOTH KNEES: ICD-10-CM

## 2023-05-08 DIAGNOSIS — M25.561 CHRONIC PAIN OF BOTH KNEES: ICD-10-CM

## 2023-05-08 DIAGNOSIS — G89.29 CHRONIC PAIN OF BOTH KNEES: ICD-10-CM

## 2023-05-08 DIAGNOSIS — I47.29 NSVT (NONSUSTAINED VENTRICULAR TACHYCARDIA) (HCC): ICD-10-CM

## 2023-05-08 DIAGNOSIS — I10 PRIMARY HYPERTENSION: ICD-10-CM

## 2023-05-08 DIAGNOSIS — G89.4 CHRONIC PAIN SYNDROME: ICD-10-CM

## 2023-05-08 DIAGNOSIS — M25.512 CHRONIC LEFT SHOULDER PAIN: ICD-10-CM

## 2023-05-08 DIAGNOSIS — F17.210 SMOKES LESS THAN 1 PACK A DAY WITH GREATER THAN 20 PACK YEAR HISTORY: ICD-10-CM

## 2023-05-08 DIAGNOSIS — M25.50 POLYARTHRALGIA: ICD-10-CM

## 2023-05-08 DIAGNOSIS — Z12.31 BREAST CANCER SCREENING BY MAMMOGRAM: ICD-10-CM

## 2023-05-08 DIAGNOSIS — Z12.11 COLON CANCER SCREENING: ICD-10-CM

## 2023-05-08 DIAGNOSIS — N31.9 NEUROGENIC BLADDER: Primary | ICD-10-CM

## 2023-05-08 DIAGNOSIS — R41.3 MEMORY PROBLEM: ICD-10-CM

## 2023-05-08 DIAGNOSIS — Z12.2 ENCOUNTER FOR SCREENING FOR LUNG CANCER: ICD-10-CM

## 2023-05-08 DIAGNOSIS — Z79.4 TYPE 2 DIABETES MELLITUS WITH DIABETIC POLYNEUROPATHY, WITH LONG-TERM CURRENT USE OF INSULIN (HCC): ICD-10-CM

## 2023-05-08 DIAGNOSIS — N18.6 ESRD (END STAGE RENAL DISEASE) (HCC): ICD-10-CM

## 2023-05-08 DIAGNOSIS — G89.4 CHRONIC PAIN DISORDER: Primary | ICD-10-CM

## 2023-05-08 DIAGNOSIS — Z93.59 SUPRAPUBIC CATHETER (HCC): ICD-10-CM

## 2023-05-08 DIAGNOSIS — M17.12 PRIMARY OSTEOARTHRITIS OF LEFT KNEE: ICD-10-CM

## 2023-05-08 DIAGNOSIS — E11.42 TYPE 2 DIABETES MELLITUS WITH DIABETIC POLYNEUROPATHY, WITH LONG-TERM CURRENT USE OF INSULIN (HCC): ICD-10-CM

## 2023-05-08 RX ORDER — DULAGLUTIDE 1.5 MG/.5ML
1.5 INJECTION, SOLUTION SUBCUTANEOUS
COMMUNITY
Start: 2023-02-09

## 2023-05-08 RX ORDER — OLANZAPINE 5 MG/1
5 TABLET ORAL
Qty: 90 TABLET | Refills: 0 | Status: SHIPPED | OUTPATIENT
Start: 2023-05-08

## 2023-05-08 RX ORDER — LEVOFLOXACIN 500 MG/1
500 TABLET, FILM COATED ORAL EVERY OTHER DAY
Qty: 3 TABLET | Refills: 0 | Status: SHIPPED | OUTPATIENT
Start: 2023-05-08 | End: 2023-05-14

## 2023-05-08 RX ORDER — TIZANIDINE 4 MG/1
4 TABLET ORAL EVERY 6 HOURS PRN
COMMUNITY
End: 2023-05-08 | Stop reason: SDUPTHER

## 2023-05-08 RX ORDER — FERROUS SULFATE 325(65) MG
325 TABLET ORAL DAILY
COMMUNITY

## 2023-05-08 RX ORDER — ALLOPURINOL 100 MG/1
TABLET ORAL
COMMUNITY
Start: 2023-04-05

## 2023-05-08 RX ORDER — INSULIN LISPRO 100 [IU]/ML
INJECTION, SOLUTION INTRAVENOUS; SUBCUTANEOUS
Qty: 15 ML | Refills: 2 | Status: SHIPPED | OUTPATIENT
Start: 2023-05-08

## 2023-05-08 RX ORDER — DIPHENHYDRAMINE HCL 25 MG
25 CAPSULE ORAL EVERY 4 HOURS PRN
COMMUNITY

## 2023-05-08 RX ORDER — TIZANIDINE 4 MG/1
4 TABLET ORAL EVERY 8 HOURS PRN
Qty: 60 TABLET | Refills: 1 | Status: SHIPPED | OUTPATIENT
Start: 2023-05-08

## 2023-05-08 RX ORDER — RANITIDINE 300 MG/1
300 TABLET ORAL DAILY
COMMUNITY

## 2023-05-08 NOTE — PROGRESS NOTES
Patient with history of neurogenic bladder, managed by SP tube  There was difficulty with exchange by VNA and tract was lost  She had to have SPT replaced in IR  She is doing well now  Cystoscopy     Date/Time 5/8/2023 1:00 PM     Performed by  Mary Gonzalez MD     Authorized by CHERELLE Beal          Procedure Details:  Procedure type: cystoscopy    Additional Procedure Details: SPT removed  Site prepped with chlorhexidine  The 12 Bhutanese scope was placed  The bladder is small capacity  Mucosa is normal  Patient did not tolerate the procedure and had pain  A new latex-free 16 Fr SPT was placed with 7 ml in balloon  It irrigated well  Due to spasm and small bladder, I decided to evaluate placement further  In the frogleg position, I prepped the urethra  The cystoscope was placed  The catheter and balloon are confirmed in the bladder  Plan  SPT exchange every 6 weeks  Do not plan to schedule routine cystoscopy as she did not tolerate it well  Cysto only if needed

## 2023-05-08 NOTE — ASSESSMENT & PLAN NOTE
Patient would benefit from strengthening exercises due to weakness and frequent falls, chronic pain, referral to home  PT/OT
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
## 2023-05-09 ENCOUNTER — PATIENT OUTREACH (OUTPATIENT)
Dept: FAMILY MEDICINE CLINIC | Facility: CLINIC | Age: 63
End: 2023-05-09

## 2023-05-09 DIAGNOSIS — F11.20 CONTINUOUS OPIOID DEPENDENCE (HCC): ICD-10-CM

## 2023-05-09 DIAGNOSIS — G89.4 CHRONIC PAIN SYNDROME: ICD-10-CM

## 2023-05-09 RX ORDER — OXYCODONE HYDROCHLORIDE 5 MG/1
7.5 TABLET ORAL EVERY 8 HOURS PRN
Qty: 120 TABLET | Refills: 0 | Status: SHIPPED | OUTPATIENT
Start: 2023-05-09 | End: 2023-06-13 | Stop reason: SDUPTHER

## 2023-05-09 NOTE — PROGRESS NOTES
"I received a call from the patient stating she has not received her oxycodone  Chart reviewed  There is a confirmation receipt of the pharmacy receiving the order  I called the pharmacy and was told they need a new order since the current order states \"not to be filled again before 5/30/2023\"; note sent to PCP  I informed the patient a new order is needed             "

## 2023-05-10 ENCOUNTER — TELEPHONE (OUTPATIENT)
Dept: VASCULAR SURGERY | Facility: CLINIC | Age: 63
End: 2023-05-10

## 2023-05-10 ENCOUNTER — OFFICE VISIT (OUTPATIENT)
Dept: VASCULAR SURGERY | Facility: CLINIC | Age: 63
End: 2023-05-10

## 2023-05-10 ENCOUNTER — HOME CARE VISIT (OUTPATIENT)
Dept: HOME HEALTH SERVICES | Facility: HOME HEALTHCARE | Age: 63
End: 2023-05-10

## 2023-05-10 ENCOUNTER — TELEPHONE (OUTPATIENT)
Dept: UROLOGY | Facility: AMBULATORY SURGERY CENTER | Age: 63
End: 2023-05-10

## 2023-05-10 VITALS — SYSTOLIC BLOOD PRESSURE: 128 MMHG | OXYGEN SATURATION: 98 % | HEART RATE: 64 BPM | DIASTOLIC BLOOD PRESSURE: 74 MMHG

## 2023-05-10 VITALS
WEIGHT: 242 LBS | SYSTOLIC BLOOD PRESSURE: 110 MMHG | OXYGEN SATURATION: 99 % | DIASTOLIC BLOOD PRESSURE: 40 MMHG | HEART RATE: 55 BPM | BODY MASS INDEX: 36.68 KG/M2 | HEIGHT: 68 IN

## 2023-05-10 DIAGNOSIS — N18.6 ESRD (END STAGE RENAL DISEASE) (HCC): ICD-10-CM

## 2023-05-10 DIAGNOSIS — Z99.2 ESRD (END STAGE RENAL DISEASE) ON DIALYSIS (HCC): Primary | ICD-10-CM

## 2023-05-10 DIAGNOSIS — N18.6 ESRD (END STAGE RENAL DISEASE) ON DIALYSIS (HCC): Primary | ICD-10-CM

## 2023-05-10 RX ORDER — CEFAZOLIN SODIUM 2 G/50ML
2000 SOLUTION INTRAVENOUS ONCE
OUTPATIENT
Start: 2023-05-10 | End: 2023-05-10

## 2023-05-10 RX ORDER — CHLORHEXIDINE GLUCONATE 0.12 MG/ML
15 RINSE ORAL ONCE
OUTPATIENT
Start: 2023-05-10 | End: 2023-05-10

## 2023-05-10 NOTE — TELEPHONE ENCOUNTER
Verified patient's insurance   CONFIRMED - Patient's insurance is [employer] [address] [place]P  Is patient requesting a call when authorization has been obtained? Patient did not request a call  Surgery Date: 5/26/23  Primary Surgeon: Akshat Mendez // Madisyn Metcalf (NPI: [id number])  Assisting Surgeon: Not Applicable (N/A)  Facility: [facility] (Tax: [id number] / NPI: [id number])  Inpatient / Outpatient: Outpatient  Level: 2    Clearance Received: No clearance ordered  Consent Received: Yes, scanned into Epic on 5/10/23  Medication Hold / Last Dose: Aspirin:   Continue (do not hold)       IR Notified: Not Applicable (N/A)  Rep  Notified: Not Applicable (N/A)  Equipment Needs: Not Applicable (N/A)  Vas Lab Requested: Not Applicable (N/A)  Patient Contacted: 5/10/23    Diagnosis: N18 6, Z99 2 ESRD (end stage renal disease) on dialysis   Procedure/ CPT Code(s): REVISION of Arteriovenous Graft withOUT Thrombosis // CPT: 95760    For varicose vein related procedures:   Last LEVDR: Not Applicable (N/A)  CEAP Classification: Not Applicable (N/A)  VCSS: Not Applicable (N/A)    Post Operative Date/ Time: 6/12/23 , Kat Butt with CHERELLE Pang (NPI: [id number])     *Please review medication hold(s), PATs, and check H&P with patient  *  PATIENT WAS MAILED SURGERY/SHOWERING/DISCHARGE/COVID INSTRUCTIONS AFTER REVIEWING WITH THEM VIA PHONE CALL

## 2023-05-10 NOTE — PROGRESS NOTES
Assessment/Plan:    ESRD (end stage renal disease) ([facility] )  Lab Results   Component Value Date    EGFR 14 02/06/2023    EGFR 21 01/17/2023    EGFR 23 01/16/2023    CREATININE 3 28 (H) 02/06/2023    CREATININE 2 38 (H) 01/17/2023    CREATININE 2 25 (H) 01/16/2023   Status post left brachiocephalic AV fistula  Attempted access resulted in infiltration  The fistula was deemed to be located deep in the mid upper arm  She did undergo a fistulogram which did not reveal any significant outflow stenosis  She is referred back to the office for superficialization of her fistula  Procedure, risk, benefits, alternatives, and anticipated postop course were reviewed in detail  Patient was agreeable to proceed  Written consent was obtained  Diagnoses and all orders for this visit:    ESRD (end stage renal disease) on dialysis [facility])  -     Case request operating room: REVISION AV FISTULA; Standing  -     Basic metabolic panel; Future  -     Case request operating room: REVISION AV FISTULA    ESRD (end stage renal disease) ([facility] )    Other orders  -     Diet NPO; Sips with meds; Standing  -     Void on call to OR; Standing  -     Insert peripheral IV; Standing  -     Nursing Communication CHG bath, have staff wash entire body (neck down) per pre op bathing protocol  Routine, evening prior to, and day of surgery ; Standing  -     Nursing Communication Swab both nares with Povidone-Iodine solution, EXCLUDE if patient has shellfish/Iodine allergy, and replace with nasal alcohol swabstick  Routine, day of surgery, on call to OR ; Standing  -     chlorhexidine (PERIDEX) 0 12 % oral rinse 15 mL  -     Place sequential compression device; Standing  -     ceFAZolin (ANCEF) IVPB (premix in dextrose) 2,000 mg 50 mL          Subjective:      Patient ID: Shaq Catherine is a 63 y o  female  Patient is here today to review results of a IR fistulagram done 4/26/2023 and she had a HD duplex done  3/15/2023   Patient is s/p creation "of a LUE AVF done 2/6/2023  Patient denies any pain, numbness or tingling in her Left arm or hand  Patient is getting T[facility] at [facility] in [facility]  She is taking ASA 81 mg and Atorvastatin  Patient is a former smoker  HPI    The following portions of the patient's history were reviewed and updated as appropriate: allergies, current medications, past family history, past medical history, past social history, past surgical history and problem list     Review of Systems   Constitutional: Negative  HENT: Negative  Eyes: Negative  Respiratory: Negative  Cardiovascular: Negative  Gastrointestinal: Negative  Endocrine: Negative  Genitourinary:        Pt has a morales catheter     Musculoskeletal: Negative  Skin: Negative  Allergic/Immunologic: Negative  Neurological: Negative  Hematological: Negative  Psychiatric/Behavioral: Negative  Objective:      BP (!) 110/40 (BP Location: Left arm, Patient Position: Sitting, Cuff Size: Standard)   Pulse 55   Ht 5' 8\" (1 727 m)   Wt 110 kg (242 lb)   SpO2 99%   BMI 36 80 kg/m²          Physical Exam  Constitutional:       General: She is not in acute distress  Appearance: She is well-developed  HENT:      Head: Normocephalic and atraumatic  Eyes:      General: No scleral icterus  Conjunctiva/sclera: Conjunctivae normal    Neck:      Trachea: No tracheal deviation  Cardiovascular:      Rate and Rhythm: Normal rate and regular rhythm  Heart sounds: Normal heart sounds  Comments: Left arm fistula with good thrill and bruit  There is resolving ecchymosis/bruising from infiltration site  Pulmonary:      Effort: Pulmonary effort is normal       Breath sounds: Normal breath sounds  Abdominal:      General: There is no distension  Palpations: Abdomen is soft  There is no mass (no appreciable aortic pulsation/aneurysm)  Tenderness: There is no abdominal tenderness  There is no guarding or rebound   " Musculoskeletal:         General: Normal range of motion  Cervical back: Normal range of motion and neck supple  Skin:     General: Skin is warm and dry  Neurological:      Mental Status: She is alert and oriented to person, place, and time  Psychiatric:         Mood and Affect: Mood normal          Behavior: Behavior normal          Thought Content:  Thought content normal          Judgment: Judgment normal              Operative Scheduling Information:    Hospital:  [facility]    Physician:  Astrid Redo    Surgery: Revision of left arm fistula     Urgency:  Standard    Level:  Level 2: Outpatients to be scheduled for surgery with time dependent medical necessity within 2 weeks    Case Length:  2 hours    Post-op Bed:  Outpatient    OR Table:  Standard    Equipment Needs:  None    Medication Instructions:  Aspirin:   Continue (do not hold)    Hydration:  No    Contrast Allergy:  no

## 2023-05-10 NOTE — TELEPHONE ENCOUNTER
Medical supply company called to confirm that the patient is with Dr Darron Curling and will be sending a form for completion for supplies

## 2023-05-10 NOTE — TELEPHONE ENCOUNTER
REMINDER: Under Reason For Call, comments MUST be formatted as:   (Surgeon's Initials) / (Procedure)      Special Instructions / FYI:     Procedure: Revision of left arm fistula     Level: 2 - Route clearance(s) to [facility] Clearance Pool     Allergies: Codeine and Latex    Instructions Given: NO Bowel Prep General Instructions     Dialysis: Yes  Where: [id number] // Days: Tuesday, Thursday, and Saturday    Return Visit Required Prior to Procedure: No     Consent: I certify that patient has signed, printed, timed, and dated their surgery consent  I certify that the patient's LEGAL NAME and DATE OF BIRTH are written in the upper left corner on BOTH sides of the consent  I certify that BOTH sides of the completed surgery consent have been scanned into the patient's Epic chart by myself on 5/10/2023  Yes, I have LABELED the consent in Epic as Consent for Vascular Procedure  For Surgical Clearances     Levels   1-3   ROUTE this encounter to [facility] Clearance Pool (AND)   [facility] Surgery Coordinator Pool     Level   4   ROUTE this encounter to [facility] Surgery Coordinator Pool       HYDRATION CLEARANCES   ONLY ROUTE TO  [facility] Clearance Pool     Patient does not require any pre operative clearance  Yes, I have ROUTED this encounter to [facility] Surgery Coordinator and/or [facility] Clearance Pool

## 2023-05-10 NOTE — ASSESSMENT & PLAN NOTE
Lab Results   Component Value Date    EGFR 14 02/06/2023    EGFR 21 01/17/2023    EGFR 23 01/16/2023    CREATININE 3 28 (H) 02/06/2023    CREATININE 2 38 (H) 01/17/2023    CREATININE 2 25 (H) 01/16/2023   Status post left brachiocephalic AV fistula  Attempted access resulted in infiltration  The fistula was deemed to be located deep in the mid upper arm  She did undergo a fistulogram which did not reveal any significant outflow stenosis  She is referred back to the office for superficialization of her fistula  Procedure, risk, benefits, alternatives, and anticipated postop course were reviewed in detail  Patient was agreeable to proceed  Written consent was obtained

## 2023-05-11 NOTE — TELEPHONE ENCOUNTER
Authorization requirements reviewed  Please refer to [facility] / Hilda Conc number [id number] for case updates      No authorization required

## 2023-05-12 DIAGNOSIS — E03.9 HYPOTHYROIDISM, UNSPECIFIED TYPE: ICD-10-CM

## 2023-05-12 LAB
DME PARACHUTE DELIVERY DATE ACTUAL: NORMAL
DME PARACHUTE DELIVERY DATE EXPECTED: NORMAL
DME PARACHUTE DELIVERY DATE REQUESTED: NORMAL
DME PARACHUTE ITEM DESCRIPTION: NORMAL
DME PARACHUTE ITEM DESCRIPTION: NORMAL
DME PARACHUTE ORDER STATUS: NORMAL
DME PARACHUTE SUPPLIER NAME: NORMAL
DME PARACHUTE SUPPLIER PHONE: NORMAL

## 2023-05-15 ENCOUNTER — HOME CARE VISIT (OUTPATIENT)
Dept: HOME HEALTH SERVICES | Facility: HOME HEALTHCARE | Age: 63
End: 2023-05-15

## 2023-05-15 VITALS — HEART RATE: 60 BPM | OXYGEN SATURATION: 98 %

## 2023-05-16 RX ORDER — LEVOTHYROXINE SODIUM 0.05 MG/1
50 TABLET ORAL DAILY
Qty: 30 TABLET | Refills: 1 | Status: SHIPPED | OUTPATIENT
Start: 2023-05-16

## 2023-05-17 ENCOUNTER — HOME CARE VISIT (OUTPATIENT)
Dept: HOME HEALTH SERVICES | Facility: HOME HEALTHCARE | Age: 63
End: 2023-05-17

## 2023-05-17 VITALS — OXYGEN SATURATION: 99 % | HEART RATE: 68 BPM

## 2023-05-19 ENCOUNTER — TELEPHONE (OUTPATIENT)
Dept: PSYCHIATRY | Facility: CLINIC | Age: 63
End: 2023-05-19

## 2023-05-20 NOTE — PATIENT INSTRUCTIONS
You are here for your 1st visit  You moved here about a year ago  The creatinine which is the blood test your kidney function was running 1 4-1 6 and you did have protein in your urine as you recall from [place] where used to live  More than likely that is due to diabetic involvement of your kidney  Recently the kidney blood test the creatinine increased  Your discovered to have some urine retention underwent a catheter placement so it is possible that the urine retention may have been playing a role  Also I noticed that the amount of protein in the urine measured was higher over the last few months so it is possible the diabetic involvement of the kidney had gotten a little bit worse  Your latest creatinine was 2 4  We are going to check some blood work in the urine test and I will call you with those results to try and rule out that there is not another superimposed issue on her kidneys  You also were on lisinopril which has been discontinued and that is a good medicine for diabetic kidney disease but was held because your number increased so that was the appropriate thing to do at that time      Obtain lab work I have provided you and I will call you with results  Follow-up as scheduled
pt biba complaining of chest pain and vomiting for the past 3 days.  mg PO given by EMS. multiple times attempted to do an EKG on the patient and the patient refused and kept pulling the leads off.

## 2023-05-22 ENCOUNTER — HOME CARE VISIT (OUTPATIENT)
Dept: HOME HEALTH SERVICES | Facility: HOME HEALTHCARE | Age: 63
End: 2023-05-22

## 2023-05-22 ENCOUNTER — TELEPHONE (OUTPATIENT)
Dept: PSYCHIATRY | Facility: CLINIC | Age: 63
End: 2023-05-22

## 2023-05-22 VITALS — HEART RATE: 62 BPM | OXYGEN SATURATION: 98 %

## 2023-05-23 ENCOUNTER — PATIENT OUTREACH (OUTPATIENT)
Dept: FAMILY MEDICINE CLINIC | Facility: CLINIC | Age: 63
End: 2023-05-23

## 2023-05-23 DIAGNOSIS — L97.509 TYPE 2 DIABETES MELLITUS WITH FOOT ULCER, WITH LONG-TERM CURRENT USE OF INSULIN (HCC): ICD-10-CM

## 2023-05-23 DIAGNOSIS — J06.9 VIRAL URI: ICD-10-CM

## 2023-05-23 DIAGNOSIS — E11.621 TYPE 2 DIABETES MELLITUS WITH FOOT ULCER, WITH LONG-TERM CURRENT USE OF INSULIN (HCC): ICD-10-CM

## 2023-05-23 DIAGNOSIS — Z79.4 TYPE 2 DIABETES MELLITUS WITH FOOT ULCER, WITH LONG-TERM CURRENT USE OF INSULIN (HCC): ICD-10-CM

## 2023-05-23 NOTE — PROGRESS NOTES
Outgoing Call:  5/23/2023    [facility] returned missed call from pt  Pt informs she still is not receiving waiver services of which she was approved for over one month ago  Pt informs she is also not receiving [employer] and her daughter Margarita Laura has not been paid for [facility] services provided to pt  [facility] did facilitate a three way call to [address] [place] [address] to speak with pt's , Caridad Song who informed on 4/20/23 that pt was approved for 55 5 hours of services a week  [employer] did speak with us and informed she was unaware that services did not begin and provided number to Ezequiel at [employer] in [place]  Margarita Laura states she has not been able to get a hold of anyone in the [facility]  [employer] states she will also call Courtney and inquire about this issue  [employer] will also be reaching out to [employer] to try and figure out why pt is not receiving meals  [employer] did confirm all services have been authorized and mentioned she is able to see that Margarita Laura has been approved for hours she states she is working  [employer] agreed to provide updates to [facility]  [facility] did assist pt in calling Courtney at [employer] and left a detailed VM requesting a call in return  Pt agreed to call [facility] if she receives a call from any of the mentioned agencies  [facility] to f/u  Next outreach is scheduled for 5/24/2023

## 2023-05-24 ENCOUNTER — HOME CARE VISIT (OUTPATIENT)
Dept: HOME HEALTH SERVICES | Facility: HOME HEALTHCARE | Age: 63
End: 2023-05-24

## 2023-05-24 VITALS — OXYGEN SATURATION: 98 % | HEART RATE: 64 BPM

## 2023-05-25 RX ORDER — INSULIN GLARGINE 100 [IU]/ML
INJECTION, SOLUTION SUBCUTANEOUS
Qty: 15 ML | Refills: 3 | Status: SHIPPED | OUTPATIENT
Start: 2023-05-25

## 2023-05-25 RX ORDER — SODIUM CHLORIDE 0.65 %
AEROSOL, SPRAY (ML) NASAL
Qty: 104 ML | Refills: 3 | Status: SHIPPED | OUTPATIENT
Start: 2023-05-25

## 2023-05-26 ENCOUNTER — HOME CARE VISIT (OUTPATIENT)
Dept: HOME HEALTH SERVICES | Facility: HOME HEALTHCARE | Age: 63
End: 2023-05-26

## 2023-05-26 ENCOUNTER — TELEPHONE (OUTPATIENT)
Dept: OTHER | Facility: OTHER | Age: 63
End: 2023-05-26

## 2023-05-26 NOTE — TELEPHONE ENCOUNTER
Pt was scheduled for surgery this morning with Dr Warren Kothari but called and cancelled as she is sick  RN Sup and Provider was TC to advise  She would like a call back to reschedule

## 2023-05-31 ENCOUNTER — OFFICE VISIT (OUTPATIENT)
Dept: OBGYN CLINIC | Facility: MEDICAL CENTER | Age: 63
End: 2023-05-31

## 2023-05-31 ENCOUNTER — HOME CARE VISIT (OUTPATIENT)
Dept: HOME HEALTH SERVICES | Facility: HOME HEALTHCARE | Age: 63
End: 2023-05-31

## 2023-05-31 ENCOUNTER — PATIENT OUTREACH (OUTPATIENT)
Dept: FAMILY MEDICINE CLINIC | Facility: CLINIC | Age: 63
End: 2023-05-31

## 2023-05-31 VITALS
WEIGHT: 242 LBS | HEART RATE: 65 BPM | SYSTOLIC BLOOD PRESSURE: 121 MMHG | BODY MASS INDEX: 36.68 KG/M2 | HEIGHT: 68 IN | DIASTOLIC BLOOD PRESSURE: 66 MMHG

## 2023-05-31 VITALS — HEART RATE: 65 BPM | OXYGEN SATURATION: 98 %

## 2023-05-31 DIAGNOSIS — G89.29 CHRONIC LEFT SHOULDER PAIN: Primary | ICD-10-CM

## 2023-05-31 DIAGNOSIS — M75.82 TENDINITIS OF LEFT ROTATOR CUFF: ICD-10-CM

## 2023-05-31 DIAGNOSIS — M25.562 CHRONIC PAIN OF BOTH KNEES: ICD-10-CM

## 2023-05-31 DIAGNOSIS — M25.561 CHRONIC PAIN OF BOTH KNEES: ICD-10-CM

## 2023-05-31 DIAGNOSIS — M25.512 CHRONIC LEFT SHOULDER PAIN: Primary | ICD-10-CM

## 2023-05-31 DIAGNOSIS — G89.29 CHRONIC PAIN OF BOTH KNEES: ICD-10-CM

## 2023-05-31 RX ADMIN — TRIAMCINOLONE ACETONIDE 40 MG: 40 INJECTION, SUSPENSION INTRA-ARTICULAR; INTRAMUSCULAR at 14:15

## 2023-05-31 RX ADMIN — BUPIVACAINE HYDROCHLORIDE 4 ML: 2.5 INJECTION, SOLUTION INFILTRATION; PERINEURAL at 14:15

## 2023-05-31 NOTE — PROGRESS NOTES
3    Assessment/Plan:    Diagnoses and all orders for this visit:    Chronic left shoulder pain  -     Ambulatory Referral to Sports Medicine  -     Large joint arthrocentesis: L subacromial bursa    Tendinitis of left rotator cuff  -     Large joint arthrocentesis: L subacromial bursa    Chronic pain of both knees  -     Ambulatory Referral to Sports Medicine    Provided Left shoulder CSI today patient tolerated well  She may f/u with me for her knee pain     Discussed treatment options including OTC and prescription medicines, ice, activity modification, physical therapy and steroid injections  Patient opted for steroid injection today in the office  Patient treats for DM, thus discussed risks of hyperglycemia from steroid injections  Patient understands and has elected to proceed with procedure  I have reviewed previous HA1c    No follow-ups on file  Subjective:   Patient ID: Karen Rosa is a 63 y o  female  New patient presents for chronic left shoulder and knee pain      Review of Systems    The following portions of the patient's chart were reviewed and updated as appropriate:    Allergy:    Allergies   Allergen Reactions   • Latex Itching   • Codeine GI Intolerance       Medications:    Current Outpatient Medications:   •  allopurinol (ZYLOPRIM) 100 mg tablet, , Disp: , Rfl:   •  allopurinol 200 MG TABS, Take 200 mg by mouth daily, Disp: 90 tablet, Rfl: 0  •  aspirin (ECOTRIN LOW STRENGTH) 81 mg EC tablet, Take 1 tablet (81 mg total) by mouth daily, Disp: 90 tablet, Rfl: 1  •  atorvastatin (LIPITOR) 40 mg tablet, Take 1 tablet (40 mg total) by mouth daily, Disp: 90 tablet, Rfl: 1  •  calcitriol (ROCALTROL) 0 5 MCG capsule, Take 1 capsule (0 5 mcg total) by mouth 3 (three) times a week, Disp: 45 capsule, Rfl: 5  •  carvedilol (COREG) 25 mg tablet, Take 1 tablet (25 mg total) by mouth 2 (two) times a day with meals, Disp: 180 tablet, Rfl: 2  •  citalopram (CeleXA) 20 mg tablet, Take 1 tablet (20 mg total) by mouth daily, Disp: 90 tablet, Rfl: 1  •  clopidogrel (PLAVIX) 75 mg tablet, Take 1 tablet (75 mg total) by mouth daily, Disp: 90 tablet, Rfl: 1  •  Deep Sea Nasal Spray 0 65 % nasal spray, INSTILL 1 SPRAY INTO EACH NOSTRIL AS NEEDED FOR CONGESTION, Disp: 104 mL, Rfl: 3  •  diphenhydrAMINE (BENADRYL) 25 mg capsule, Take 25 mg by mouth every 4 (four) hours as needed, Disp: , Rfl:   •  dulaglutide (Trulicity) 1 5 BX/6 9IS injection, Inject 1 5 mcg under the skin every 7 days, Disp: , Rfl:   •  ferrous sulfate 325 (65 Fe) mg tablet, Take 325 mg by mouth daily, Disp: , Rfl:   •  furosemide (LASIX) 80 mg tablet, Take 1 tablet (80 mg total) by mouth daily, Disp: 90 tablet, Rfl: 3  •  insulin lispro (HumaLOG) 100 units/mL injection pen, INJECT 20 UNITS UNDER THE SKIN 3 (THREE) TIMES A DAY WITH MEALS, Disp: 15 mL, Rfl: 2  •  isosorbide mononitrate (IMDUR) 30 mg 24 hr tablet, TAKE 1 TABLET (30 MG TOTAL) BY MOUTH EVERY EVENING, Disp: 30 tablet, Rfl: 0  •  ketoconazole (NIZORAL) 2 % cream, Apply to suprapubic catheter site twice daily  , Disp: , Rfl:   •  Lantus SoloStar 100 units/mL SOPN, INJECT 50 UNITS UNDER THE SKIN EVERY 12 (TWELVE) HOURS, Disp: 15 mL, Rfl: 3  •  levothyroxine 50 mcg tablet, TAKE 1 TABLET (50 MCG TOTAL) BY MOUTH DAILY, Disp: 30 tablet, Rfl: 1  •  lidocaine (LMX) 4 % cream, Apply topically as needed for mild pain, Disp: 30 g, Rfl: 0  •  losartan (COZAAR) 25 mg tablet, Take 1 tablet (25 mg total) by mouth daily, Disp: 90 tablet, Rfl: 1  •  nitroglycerin (NITROSTAT) 0 4 mg SL tablet, Place 1 tablet (0 4 mg total) under the tongue every 5 (five) minutes as needed for chest pain, Disp: 25 tablet, Rfl: 1  •  nystatin (MYCOSTATIN) powder, Apply topically 2 (two) times a day, Disp: 30 g, Rfl: 1  •  OLANZapine (ZyPREXA) 5 mg tablet, TAKE 1 TABLET (5 MG TOTAL) BY MOUTH DAILY AT BEDTIME, Disp: 90 tablet, Rfl: 0  •  ondansetron (ZOFRAN) 4 mg tablet, Take 1 tablet (4 mg total) by mouth every 8 (eight) hours as needed for nausea or vomiting, Disp: 12 tablet, Rfl: 0  •  oxyCODONE (ROXICODONE) 5 immediate release tablet, Take 1 5 tablets (7 5 mg total) by mouth every 8 (eight) hours as needed for severe pain 30 days supply Max Daily Amount: 22 5 mg, Disp: 120 tablet, Rfl: 0  •  pantoprazole (PROTONIX) 40 mg tablet, Take 1 tablet (40 mg total) by mouth daily, Disp: 180 tablet, Rfl: 1  •  ranitidine (ZANTAC) 300 MG tablet, Take 300 mg by mouth daily, Disp: , Rfl:   •  sevelamer carbonate (RENVELA) 800 mg tablet, Take 2 tablets (1,600 mg total) by mouth 3 (three) times a day with meals, Disp: 540 tablet, Rfl: 4  •  tiZANidine (ZANAFLEX) 4 mg tablet, Take 1 tablet (4 mg total) by mouth every 8 (eight) hours as needed for muscle spasms, Disp: 60 tablet, Rfl: 1  •  naloxone (NARCAN) 4 mg/0 1 mL nasal spray, Administer 1 spray into a nostril  If breathing does not return to normal or if breathing difficulty resumes after 2-3 minutes, give another dose in the other nostril using a new spray   (Patient not taking: Reported on 5/23/2023), Disp: 1 each, Rfl: 1  •  silver sulfadiazine (SILVADENE,SSD) 1 % cream, Apply topically daily (Patient not taking: Reported on 3/31/2023), Disp: 50 g, Rfl: 0  •  sodium chloride 0 9 % SOLN 100 mL with insulin regular 100 units/mL SOLN 100 Units, Inject 20 Units under the skin 3 (three) times a day with meals (Patient not taking: Reported on 5/23/2023), Disp: , Rfl:     Current Facility-Administered Medications:   •  cyanocobalamin injection 1,000 mcg, 1,000 mcg, Intramuscular, Q30 Days, CHERELLE Ross, 1,000 mcg at 01/05/23 1651    Patient Active Problem List   Diagnosis   • Major depressive disorder   • Type 2 diabetes mellitus with chronic kidney disease on chronic dialysis, with long-term current use of insulin (HCC)   • CAD (coronary artery disease)   • Anemia   • S/P cervical spinal fusion   • Problem with vascular access   • HTN (hypertension)   • Severe episode of "recurrent major depressive disorder, without psychotic features ([facility] )   • Memory problem   • ESRD (end stage renal disease) ([facility] )   • History of heart artery stent   • Chronic combined systolic and diastolic congestive heart failure ([facility])   • Prolonged QT interval   • Other proteinuria   • Gastroesophageal reflux disease without esophagitis   • Acquired hypothyroidism   • Mixed hyperlipidemia   • Other artificial openings of urinary tract status ([facility])   • Continuous opioid dependence ([facility])   • Adrenal nodule ([facility])   • Stable angina ([facility])   • Chest pain   • History of anemia due to chronic kidney disease   • NSVT (nonsustained ventricular tachycardia) ([facility])   • Suprapubic catheter ([facility] )   • Polyarthralgia   • Chronic left shoulder pain   • Dependence on renal dialysis ([facility] )   • Malaise   • Cystitis   • Chronic pain disorder       Objective:  /66   Pulse 65   Ht 5' 8\" (1 727 m)   Wt 110 kg (242 lb)   BMI 36 80 kg/m²     Left Shoulder Exam     Range of Motion   Active abduction: abnormal   External rotation: normal     Muscle Strength   External rotation: 5/5     Tests   Drop arm: negative    Other   Erythema: absent             Physical Exam      Neurologic Exam    Large joint arthrocentesis: L subacromial bursa  Universal Protocol:  Consent: Verbal consent obtained  Risks and benefits: risks, benefits and alternatives were discussed  Consent given by: patient  Time out: Immediately prior to procedure a \"time out\" was called to verify the correct patient, procedure, equipment, support staff and site/side marked as required    Timeout called at: 5/31/2023 2:46 PM   Patient understanding: patient states understanding of the procedure being performed  Test results: test results available and properly labeled  Site marked: the operative site was marked  Patient identity confirmed: verbally with patient    Supporting Documentation  Indications: pain   Procedure Details  Location: shoulder - L subacromial " bursa  Preparation: Patient was prepped and draped in the usual sterile fashion  Needle size: 22 G  Ultrasound guidance: no  Approach: posterolateral  Medications administered: 40 mg triamcinolone acetonide 40 mg/mL; 4 mL bupivacaine 0 25 %    Patient tolerance: patient tolerated the procedure well with no immediate complications  Dressing:  Sterile dressing applied    No erythema of Shoulder(s)          I have personally reviewed pertinent films in PACS  and I have personally reviewed the written report of the pertinent studies  Xray Left shoulder and Left Knee            Past Medical History:   Diagnosis Date   • Abnormal liver function    • Anemia    • Anxiety    • Arthritis    • CHF (congestive heart failure) ([facility])    • Chronic kidney disease     stage 3   • Chronic narcotic dependence ([facility])    • Chronic pain disorder     lower back, hands , neck and knees   • Coronary artery disease    • Depression    • Diabetes mellitus ([facility] )    • Elevated troponin 02/11/2022   • GERD (gastroesophageal reflux disease)     no meds at present   • Heart murmur     murmur   • Hyperlipidemia    • Hypertension    • Neurogenic bladder    • Open toe wound 12/2020    right big toe open calus but is dry at present   • PONV (postoperative nausea and vomiting)    • Renal disorder    • Shortness of breath     exertion   • Skin ulcer of hand, limited to breakdown of skin ([facility] ) 02/25/2021   • Sleep apnea     doesn't use cpap   • Suprapubic catheter [facility])    • Urinary retention        Past Surgical History:   Procedure Laterality Date   • AMPUTATION Left     2nd toe   • ANGIOPLASTY  2017 5   • BREAST EXCISIONAL BIOPSY Left    • BREAST SURGERY     • CARDIAC CATHETERIZATION     • CARDIAC CATHETERIZATION N/A 07/01/2022    Procedure: Cardiac catheterization;  Surgeon: Aidee Puckett MD;  Location: [facility] CARDIAC [facility]ATH [facility];   Service: Cardiology   • CARDIAC CATHETERIZATION N/A 07/01/2022    Procedure: Cardiac pci;  Surgeon: Aidee Puckett MD; Location: [facility]; Service: Cardiology   • CARPAL TUNNEL RELEASE Bilateral    • CERVICAL FUSION     • HYSTERECTOMY     • IR AV FISTULAGRAM/GRAFTOGRAM  2023   • IR SUPRAPUBIC CATHETER PLACEMENT  06/15/2021   • IR SUPRAPUBIC CATHETER PLACEMENT  2023   • IR TUNNELED CENTRAL LINE PLACEMENT  2023   • IR TUNNELED DIALYSIS CATHETER PLACEMENT  07/15/2022   • IR TUNNELED DIALYSIS CATHETER PLACEMENT  2022   • KNEE SURGERY     • OOPHORECTOMY     • SC ARTERIOVENOUS ANASTOMOSIS OPEN DIRECT Left 2023    Procedure: CREATION FISTULA  ARTERIOVENOUS (AV);   Surgeon: Corrinne Riches, DO;  Location: [facility] Main OR;  Service: Vascular   • SC EXC B9 LESION MRGN XCP SK TG S/N/H/F/G 3 1-4 0CM N/A 2020    Procedure: EXCISION SEBACEOUS CYST X 5 SCALP;  Surgeon: Sage Frausto MD;  Location: [address] [place] [address] MAIN OR;  Service: General   • TOE AMPUTATION Left    • TRIGGER FINGER RELEASE Right     4th Finger       Social History     Socioeconomic History   • Marital status:      Spouse name: Not on file   • Number of children: Not on file   • Years of education: Not on file   • Highest education level: Not on file   Occupational History   • Not on file   Tobacco Use   • Smoking status: Former     Packs/day: 1 00     Years: 35 00     Total pack years: 35 00     Types: Cigarettes     Quit date:      Years since quittin 4   • Smokeless tobacco: Never   Vaping Use   • Vaping Use: Never used   Substance and Sexual Activity   • Alcohol use: Not Currently   • Drug use: Never   • Sexual activity: Not Currently   Other Topics Concern   • Not on file   Social History Narrative   • Not on file     Social Determinants of Health     Financial Resource Strain: Low Risk  (2022)    Overall Financial Resource Strain (CARDIA)    • Difficulty of Paying Living Expenses: Not hard at all   Food Insecurity: No Food Insecurity (2022)    Hunger Vital Sign    • Worried About Running Out of Food in the Last Year: Never true    • Ran Out of Food in the Last Year: Never true   Transportation Needs: No Transportation Needs (7/11/2022)    PRAPARE - Transportation    • Lack of Transportation (Medical): No    • Lack of Transportation (Non-Medical):  No   Physical Activity: Not on file   Stress: Not on file   Social Connections: Not on file   Intimate Partner Violence: Not on file   Housing Stability: Low Risk  (7/11/2022)    Housing Stability Vital Sign    • Unable to Pay for Housing in the Last Year: No    • Number of Places Lived in the Last Year: 1    • Unstable Housing in the Last Year: No       Family History   Problem Relation Age of Onset   • Stroke Father    • Heart disease Father    • No Known Problems Mother    • No Known Problems Sister    • No Known Problems Daughter    • No Known Problems Maternal Grandmother    • No Known Problems Maternal Grandfather    • No Known Problems Paternal Grandmother    • No Known Problems Paternal Grandfather    • No Known Problems Maternal Aunt    • No Known Problems Maternal Aunt    • No Known Problems Maternal Aunt    • No Known Problems Maternal Aunt    • No Known Problems Maternal Aunt    • No Known Problems Maternal Aunt    • No Known Problems Paternal Aunt

## 2023-05-31 NOTE — PROGRESS NOTES
I called the patient but she stated she was at her Ortho appointment; she agreed for me to call next week

## 2023-06-01 ENCOUNTER — HOME CARE VISIT (OUTPATIENT)
Dept: HOME HEALTH SERVICES | Facility: HOME HEALTHCARE | Age: 63
End: 2023-06-01

## 2023-06-02 RX ORDER — TRIAMCINOLONE ACETONIDE 40 MG/ML
40 INJECTION, SUSPENSION INTRA-ARTICULAR; INTRAMUSCULAR
Status: COMPLETED | OUTPATIENT
Start: 2023-05-31 | End: 2023-05-31

## 2023-06-02 RX ORDER — BUPIVACAINE HYDROCHLORIDE 2.5 MG/ML
4 INJECTION, SOLUTION INFILTRATION; PERINEURAL
Status: COMPLETED | OUTPATIENT
Start: 2023-05-31 | End: 2023-05-31

## 2023-06-05 ENCOUNTER — HOME CARE VISIT (OUTPATIENT)
Dept: HOME HEALTH SERVICES | Facility: HOME HEALTHCARE | Age: 63
End: 2023-06-05
Payer: COMMERCIAL

## 2023-06-07 ENCOUNTER — PATIENT OUTREACH (OUTPATIENT)
Dept: FAMILY MEDICINE CLINIC | Facility: CLINIC | Age: 63
End: 2023-06-07

## 2023-06-07 ENCOUNTER — HOME CARE VISIT (OUTPATIENT)
Dept: HOME HEALTH SERVICES | Facility: HOME HEALTHCARE | Age: 63
End: 2023-06-07
Payer: COMMERCIAL

## 2023-06-07 VITALS — HEART RATE: 60 BPM | OXYGEN SATURATION: 98 %

## 2023-06-07 PROCEDURE — G0151 HHCP-SERV OF PT,EA 15 MIN: HCPCS

## 2023-06-07 NOTE — PROGRESS NOTES
I called the patient but received voicemail  Message was left stating I will call back next week and I also reminded her of her Transplant appointment 6/12 at 0940

## 2023-06-13 ENCOUNTER — PATIENT OUTREACH (OUTPATIENT)
Dept: FAMILY MEDICINE CLINIC | Facility: CLINIC | Age: 63
End: 2023-06-13

## 2023-06-13 DIAGNOSIS — F11.20 CONTINUOUS OPIOID DEPENDENCE (HCC): ICD-10-CM

## 2023-06-13 DIAGNOSIS — G89.4 CHRONIC PAIN SYNDROME: ICD-10-CM

## 2023-06-14 ENCOUNTER — PATIENT OUTREACH (OUTPATIENT)
Dept: FAMILY MEDICINE CLINIC | Facility: CLINIC | Age: 63
End: 2023-06-14

## 2023-06-14 DIAGNOSIS — G89.4 CHRONIC PAIN SYNDROME: ICD-10-CM

## 2023-06-14 RX ORDER — OXYCODONE HYDROCHLORIDE 5 MG/1
7.5 TABLET ORAL EVERY 8 HOURS PRN
Qty: 120 TABLET | Refills: 0 | Status: SHIPPED | OUTPATIENT
Start: 2023-06-14

## 2023-06-14 NOTE — PROGRESS NOTES
"I called the patient to follow up  She states she has been feeling \"ok, achy\"  She believes her blood sugars have been stable but admits to not checking them recently  The patient did note she is taking her DM meds as ordered  She states her daughter is checking her feet daily and denies any skin breakdown  The patient notes she is trying to watch her diet but it is difficult with the food she receives from the food bank  The patient notes hydralazine was discontinued because her blood pressures were on the low side  The patient denies chest pain, shortness of breath or extremity edema  I informed the patient her pain meds were ordered and she was thankful  I reminded the patient of her Uro appointment on 6/19 at 1100  I will continue to follow        "

## 2023-06-15 RX ORDER — TIZANIDINE 4 MG/1
4 TABLET ORAL EVERY 8 HOURS PRN
Qty: 60 TABLET | Refills: 1 | Status: SHIPPED | OUTPATIENT
Start: 2023-06-15

## 2023-06-19 ENCOUNTER — PROCEDURE VISIT (OUTPATIENT)
Dept: UROLOGY | Facility: CLINIC | Age: 63
End: 2023-06-19
Payer: COMMERCIAL

## 2023-06-19 VITALS
HEIGHT: 68 IN | HEART RATE: 66 BPM | DIASTOLIC BLOOD PRESSURE: 70 MMHG | BODY MASS INDEX: 36.83 KG/M2 | WEIGHT: 243 LBS | RESPIRATION RATE: 20 BRPM | SYSTOLIC BLOOD PRESSURE: 110 MMHG

## 2023-06-19 DIAGNOSIS — N31.9 NEUROGENIC BLADDER: Primary | ICD-10-CM

## 2023-06-19 PROCEDURE — 51705 CHANGE OF BLADDER TUBE: CPT

## 2023-06-19 NOTE — PROGRESS NOTES
"6/19/2023    Morteza Swan  1960  [id number]    Diagnosis  Chief Complaint    Neurogenic bladder         Patient presents for SPT change managed by Dr Tomeka Lara  Follow up in 6 weeks for SPT exchange    Procedure: Suprapubic Tube Change         Cystostomy tube change     Date/Time 6/19/2023 11:00 AM     Performed by  Porfirio Morales RN   Authorized by Annie Solano MD     [place] Protocol   Consent: Verbal consent obtained  Consent given by: patient       Procedure Details   Patient tolerance: patient tolerated the procedure well with no immediate complications               Current catheter removed without difficulty after deflation of an intact balloon  Site prepped with Hibiclens, new 16F  suprapubic spt change via aseptic technique without incident, 7 ml balloon inflated with sterile water  irrigated easily for straw-yellow return, mild spasm noted  Patient tolerated well  Attached to drainage bag       Vitals:    06/19/23 1133   BP: 110/70   Pulse: 66   Resp: 20   Weight: 110 kg (243 lb)   Height: 5' 8\" (1 727 m)         Porfirio Morales RN  "

## 2023-06-23 ENCOUNTER — ANESTHESIA EVENT (OUTPATIENT)
Dept: PERIOP | Facility: HOSPITAL | Age: 63
End: 2023-06-23
Payer: COMMERCIAL

## 2023-06-23 ENCOUNTER — PATIENT OUTREACH (OUTPATIENT)
Dept: FAMILY MEDICINE CLINIC | Facility: CLINIC | Age: 63
End: 2023-06-23

## 2023-06-23 ENCOUNTER — HOME CARE VISIT (OUTPATIENT)
Dept: HOME HEALTH SERVICES | Facility: HOME HEALTHCARE | Age: 63
End: 2023-06-23
Payer: COMMERCIAL

## 2023-06-23 NOTE — PROGRESS NOTES
I received a call from the patient asking for the phone number to reschedule her VascSurg appointment; number was provided  The patient could not recall the reason for the surgery  I will continue to follow

## 2023-06-23 NOTE — CASE COMMUNICATION
I was keeping this patient open to home health for suprapubic catheter changes but the pt has decided to continue to go to the urology office for catheter changes  Can you put in a dc oasis as of your last visit as nursing will no longer be making visits  Thank you

## 2023-07-01 DIAGNOSIS — Z79.4 TYPE 2 DIABETES MELLITUS WITH FOOT ULCER, WITH LONG-TERM CURRENT USE OF INSULIN (HCC): Primary | ICD-10-CM

## 2023-07-01 DIAGNOSIS — L97.509 TYPE 2 DIABETES MELLITUS WITH FOOT ULCER, WITH LONG-TERM CURRENT USE OF INSULIN (HCC): Primary | ICD-10-CM

## 2023-07-01 DIAGNOSIS — E11.621 TYPE 2 DIABETES MELLITUS WITH FOOT ULCER, WITH LONG-TERM CURRENT USE OF INSULIN (HCC): Primary | ICD-10-CM

## 2023-07-03 ENCOUNTER — ANESTHESIA (OUTPATIENT)
Dept: PERIOP | Facility: HOSPITAL | Age: 63
End: 2023-07-03
Payer: COMMERCIAL

## 2023-07-03 ENCOUNTER — HOSPITAL ENCOUNTER (OUTPATIENT)
Facility: HOSPITAL | Age: 63
Setting detail: OUTPATIENT SURGERY
Discharge: HOME | End: 2023-07-03
Attending: SURGERY | Admitting: SURGERY
Payer: COMMERCIAL

## 2023-07-03 VITALS
OXYGEN SATURATION: 94 % | WEIGHT: 244.93 LBS | TEMPERATURE: 97.9 F | BODY MASS INDEX: 38.44 KG/M2 | HEIGHT: 67 IN | RESPIRATION RATE: 18 BRPM | HEART RATE: 66 BPM | SYSTOLIC BLOOD PRESSURE: 121 MMHG | DIASTOLIC BLOOD PRESSURE: 58 MMHG

## 2023-07-03 LAB
GLUCOSE SERPL-MCNC: 113 MG/DL (ref 65–140)
GLUCOSE SERPL-MCNC: 188 MG/DL (ref 65–140)
POTASSIUM SERPL-SCNC: 4.4 MMOL/L (ref 3.5–5.3)

## 2023-07-03 PROCEDURE — 82948 REAGENT STRIP/BLOOD GLUCOSE: CPT

## 2023-07-03 PROCEDURE — 36832 AV FISTULA REVISION OPEN: CPT

## 2023-07-03 PROCEDURE — 84132 ASSAY OF SERUM POTASSIUM: CPT | Performed by: ANESTHESIOLOGY

## 2023-07-03 PROCEDURE — 99024 POSTOP FOLLOW-UP VISIT: CPT | Performed by: SURGERY

## 2023-07-03 PROCEDURE — 36832 AV FISTULA REVISION OPEN: CPT | Performed by: SURGERY

## 2023-07-03 RX ORDER — SODIUM CHLORIDE 9 MG/ML
125 INJECTION, SOLUTION INTRAVENOUS CONTINUOUS
Status: DISCONTINUED | OUTPATIENT
Start: 2023-07-03 | End: 2023-07-03

## 2023-07-03 RX ORDER — ONDANSETRON 2 MG/ML
4 INJECTION INTRAMUSCULAR; INTRAVENOUS ONCE AS NEEDED
Status: COMPLETED | OUTPATIENT
Start: 2023-07-03 | End: 2023-07-03

## 2023-07-03 RX ORDER — FENTANYL CITRATE/PF 50 MCG/ML
50 SYRINGE (ML) INJECTION
Status: DISCONTINUED | OUTPATIENT
Start: 2023-07-03 | End: 2023-07-03 | Stop reason: HOSPADM

## 2023-07-03 RX ORDER — FENTANYL CITRATE 50 UG/ML
INJECTION, SOLUTION INTRAMUSCULAR; INTRAVENOUS AS NEEDED
Status: DISCONTINUED | OUTPATIENT
Start: 2023-07-03 | End: 2023-07-03

## 2023-07-03 RX ORDER — MEPERIDINE HYDROCHLORIDE 25 MG/ML
12.5 INJECTION INTRAMUSCULAR; INTRAVENOUS; SUBCUTANEOUS ONCE AS NEEDED
Status: DISCONTINUED | OUTPATIENT
Start: 2023-07-03 | End: 2023-07-03 | Stop reason: HOSPADM

## 2023-07-03 RX ORDER — CEFAZOLIN SODIUM 1 G/3ML
INJECTION, POWDER, FOR SOLUTION INTRAMUSCULAR; INTRAVENOUS AS NEEDED
Status: DISCONTINUED | OUTPATIENT
Start: 2023-07-03 | End: 2023-07-03

## 2023-07-03 RX ORDER — DULAGLUTIDE 1.5 MG/.5ML
INJECTION, SOLUTION SUBCUTANEOUS
Qty: 6 ML | Refills: 0 | Status: SHIPPED | OUTPATIENT
Start: 2023-07-03

## 2023-07-03 RX ORDER — HYDROMORPHONE HCL/PF 1 MG/ML
0.5 SYRINGE (ML) INJECTION
Status: DISCONTINUED | OUTPATIENT
Start: 2023-07-03 | End: 2023-07-03 | Stop reason: HOSPADM

## 2023-07-03 RX ORDER — PROMETHAZINE HYDROCHLORIDE 25 MG/ML
6.25 INJECTION, SOLUTION INTRAMUSCULAR; INTRAVENOUS ONCE AS NEEDED
Status: DISCONTINUED | OUTPATIENT
Start: 2023-07-03 | End: 2023-07-03 | Stop reason: HOSPADM

## 2023-07-03 RX ORDER — EPHEDRINE SULFATE 50 MG/ML
INJECTION INTRAVENOUS AS NEEDED
Status: DISCONTINUED | OUTPATIENT
Start: 2023-07-03 | End: 2023-07-03

## 2023-07-03 RX ORDER — OXYCODONE HYDROCHLORIDE AND ACETAMINOPHEN 5; 325 MG/1; MG/1
1 TABLET ORAL EVERY 4 HOURS PRN
Status: DISCONTINUED | OUTPATIENT
Start: 2023-07-03 | End: 2023-07-03 | Stop reason: HOSPADM

## 2023-07-03 RX ORDER — OXYCODONE HYDROCHLORIDE AND ACETAMINOPHEN 5; 325 MG/1; MG/1
2 TABLET ORAL EVERY 4 HOURS PRN
Status: DISCONTINUED | OUTPATIENT
Start: 2023-07-03 | End: 2023-07-03 | Stop reason: HOSPADM

## 2023-07-03 RX ORDER — HYDROMORPHONE HCL/PF 1 MG/ML
0.2 SYRINGE (ML) INJECTION
Status: DISCONTINUED | OUTPATIENT
Start: 2023-07-03 | End: 2023-07-03 | Stop reason: HOSPADM

## 2023-07-03 RX ORDER — LIDOCAINE HYDROCHLORIDE 10 MG/ML
INJECTION, SOLUTION EPIDURAL; INFILTRATION; INTRACAUDAL; PERINEURAL AS NEEDED
Status: DISCONTINUED | OUTPATIENT
Start: 2023-07-03 | End: 2023-07-03

## 2023-07-03 RX ORDER — SODIUM CHLORIDE 9 MG/ML
50 INJECTION, SOLUTION INTRAVENOUS CONTINUOUS
Status: DISCONTINUED | OUTPATIENT
Start: 2023-07-03 | End: 2023-07-03 | Stop reason: HOSPADM

## 2023-07-03 RX ORDER — MIDAZOLAM HYDROCHLORIDE 2 MG/2ML
INJECTION, SOLUTION INTRAMUSCULAR; INTRAVENOUS AS NEEDED
Status: DISCONTINUED | OUTPATIENT
Start: 2023-07-03 | End: 2023-07-03

## 2023-07-03 RX ORDER — PROPOFOL 10 MG/ML
INJECTION, EMULSION INTRAVENOUS AS NEEDED
Status: DISCONTINUED | OUTPATIENT
Start: 2023-07-03 | End: 2023-07-03

## 2023-07-03 RX ORDER — ONDANSETRON 2 MG/ML
INJECTION INTRAMUSCULAR; INTRAVENOUS AS NEEDED
Status: DISCONTINUED | OUTPATIENT
Start: 2023-07-03 | End: 2023-07-03

## 2023-07-03 RX ADMIN — CEFAZOLIN 2000 MG: 1 INJECTION, POWDER, FOR SOLUTION INTRAMUSCULAR; INTRAVENOUS at 09:28

## 2023-07-03 RX ADMIN — FENTANYL CITRATE 25 MCG: 50 INJECTION INTRAMUSCULAR; INTRAVENOUS at 10:25

## 2023-07-03 RX ADMIN — FENTANYL CITRATE 25 MCG: 50 INJECTION INTRAMUSCULAR; INTRAVENOUS at 09:34

## 2023-07-03 RX ADMIN — FENTANYL CITRATE 25 MCG: 50 INJECTION INTRAMUSCULAR; INTRAVENOUS at 09:25

## 2023-07-03 RX ADMIN — EPHEDRINE SULFATE 5 MG: 50 INJECTION, SOLUTION INTRAVENOUS at 10:05

## 2023-07-03 RX ADMIN — ONDANSETRON 4 MG: 2 INJECTION INTRAMUSCULAR; INTRAVENOUS at 11:26

## 2023-07-03 RX ADMIN — SODIUM CHLORIDE 50 ML/HR: 0.9 INJECTION, SOLUTION INTRAVENOUS at 08:37

## 2023-07-03 RX ADMIN — LIDOCAINE HYDROCHLORIDE 100 MG: 10 INJECTION, SOLUTION EPIDURAL; INFILTRATION; INTRACAUDAL; PERINEURAL at 09:16

## 2023-07-03 RX ADMIN — OXYCODONE HYDROCHLORIDE AND ACETAMINOPHEN 1 TABLET: 5; 325 TABLET ORAL at 12:21

## 2023-07-03 RX ADMIN — MIDAZOLAM 2 MG: 1 INJECTION INTRAMUSCULAR; INTRAVENOUS at 09:09

## 2023-07-03 RX ADMIN — FENTANYL CITRATE 50 MCG: 50 INJECTION INTRAMUSCULAR; INTRAVENOUS at 09:45

## 2023-07-03 RX ADMIN — FENTANYL CITRATE 25 MCG: 50 INJECTION INTRAMUSCULAR; INTRAVENOUS at 10:27

## 2023-07-03 RX ADMIN — FENTANYL CITRATE 50 MCG: 50 INJECTION, SOLUTION INTRAMUSCULAR; INTRAVENOUS at 11:34

## 2023-07-03 RX ADMIN — PROPOFOL 150 MG: 10 INJECTION, EMULSION INTRAVENOUS at 09:16

## 2023-07-03 RX ADMIN — ONDANSETRON 4 MG: 2 INJECTION INTRAMUSCULAR; INTRAVENOUS at 10:50

## 2023-07-03 RX ADMIN — FENTANYL CITRATE 50 MCG: 50 INJECTION, SOLUTION INTRAMUSCULAR; INTRAVENOUS at 11:26

## 2023-07-03 NOTE — OP NOTE
OPERATIVE REPORT  PATIENT NAME: Grace Cooper    :  1960  MRN: [id number]  Pt Location: [facility]    SURGERY DATE: 7/3/2023    Surgeon(s) and Role:     * Minna Herbert, DO - Primary     * Kera Bay PA-C - Assisting    Preop Diagnosis:  ESRD (end stage renal disease) on dialysis ([address]) [N18.6, Z99.2]    Post-Op Diagnosis Codes:     * ESRD (end stage renal disease) on dialysis ([address]) [N18.6, Z99.2]    Procedure(s):  Left - REVISION AV FISTULA    Specimen(s):  * No specimens in log *    Estimated Blood Loss:   50 mL    Drains:  Suprapubic Catheter 16 Fr. (Active)   Number of days: 94       Suprapubic Catheter 16 Fr. (Active)   Number of days: 14       Anesthesia Type:   General    Operative Indications:  ESRD (end stage renal disease) on dialysis ([address]) [N18.6, Z99.2]  Harmony Corral is a pleasant 51-year-old woman who previously underwent a left brachiocephalic AV fistula back in February. Despite her fistula adequately maturing it was located deep due to the circumference of the arm. She did undergo a fistulogram demonstrating adequately matured outflow tract with several collateral branches. Left brachiocephalic AV fistula revision with ligation of collateral branches as well as superficialization of fistula recommended. The procedure, risk, benefits, alternatives, and anticipated postoperative course ellie in detail. Patient is agreeable to proceed. Risks and was obtained. Patient brought to the operating room for the above-mentioned procedure. Operative Findings:  Excellent well-developed cephalic outflow tract. Following superficialization and skin closure patient with good thrill. Of note due to patient's circumference and vein length limited ability to transpose vein. The vein however was adequately superficialized beneath the skin incision. Complications:   None    Procedure and Technique:  The patient was probably identified in the preop holding area.   The patient's name, laterality, and age procedure verified. The operative site was marked. The patient was brought to the operating room where she was positioned supine on the OR table. After adequate duction of general anesthesia patient's left upper extremity was placed in 90 degrees abduction on an arm table. The left upper extremity circumferentially prepped using chlorhexidine and draped in usual sterile fashion. A preoperative dose of antibiotics was given. A formal timeout was performed. A skin incision was carried out directly overlying the palpable fistula in the mid to proximal arm. The incision was deepened down through the subcutaneous tissue electrocautery. Self-retaining retractors were positioned to facilitate exposure. The vein was identified and circumferentially dissected free from the surrounding tissue. Several large branches were identified and divided between suture ligatures/ties. Once adequate mobilization of the vein was performed a subcutaneous skin flap was created using the electrocautery. The wound was irrigated and hemostasis secured. The vein was next elevated and the subcutaneous flap delivered beneath the vein and sutured in place using 2-0 Vicryl suture. The dermis was next reapproximated using interrupted 3-0 Monocryl suture. The skin was reapproximated using a 4-0 Monocryl subcuticular stitch. Exofin glue was applied to the incision site. A sterile dressing consisting of Telfa and Tegaderm was applied. All needles, instruments, and sponge counts were reported correct. The patient was awakened, extubated and transferred to recovery room in stable condition. I was present for the entire procedure., A qualified resident physician was not available. and A physician assistant was required during the procedure for retraction, tissue handling, dissection and suturing.     Patient Disposition:  PACU         SIGNATURE: Bishop Parnell DO  DATE: July 3, 2023  TIME: 11:18 AM

## 2023-07-03 NOTE — ANESTHESIA PREPROCEDURE EVALUATION
Procedure:  REVISION AV FISTULA (Left: Arm)    Relevant Problems   CARDIO   (+) CAD (coronary artery disease)   (+) Chest pain   (+) Chronic combined systolic and diastolic congestive heart failure (HCC)   (+) HTN (hypertension)   (+) Mixed hyperlipidemia   (+) Stable angina (HCC)      ENDO   (+) Acquired hypothyroidism   (+) Type 2 diabetes mellitus with chronic kidney disease on chronic dialysis, with long-term current use of insulin (HCC)      GI/HEPATIC   (+) Gastroesophageal reflux disease without esophagitis      /RENAL   (+) Dependence on renal dialysis (HCC)   (+) ESRD (end stage renal disease) (HCC)      HEMATOLOGY   (+) Anemia      NEURO/PSYCH   (+) Chronic left shoulder pain   (+) Chronic pain disorder   (+) Continuous opioid dependence ([facility])   (+) Major depressive disorder   (+) Severe episode of recurrent major depressive disorder, without psychotic features ([facility])   (+) Stable angina (HCC)        Physical Exam    Airway    Mallampati score: III  TM Distance: >3 FB  Neck ROM: full     Dental   Comment: " only 8 teeth in there",     Cardiovascular  Rhythm: regular, Rate: normal, Cardiovascular exam normal    Pulmonary  Pulmonary exam normal Breath sounds clear to auscultation,     Other Findings    7/1/22     • Mid Cx lesion is 80% stenosed. • Prox RCA lesion is 95% stenosed. CAD, with 80% stenosis, mid dominant circumflex. Lesion was just proximal to bifurcation of large OM1 branch. Circ lesion was flow-signifcant (iFR=0.76). The large OM branch had been stented in the past, and contained a non-critical proximal stenosis. The small, non-dominant RCA was severely diseased. Successful IVUS-guided PCI of mid circumflex under GuideLiner support, with reduction in stenosis from 80% to 0% following placement of a 3.5x33mm JANET. The jailed OM1 branch remained widely patent. Plan: DAPT, statin, lifestyle intervention.      Normal sinus rhythm  Left bundle branch block  Abnormal ECG  When compared with ECG of 25-AUG-2022 19:15,  QRS duration has decreased  T wave inversion no longer evident in Inferior leads  T wave inversion more evident in Lateral leads       1. Normal left ventricular size, moderate concentric left ventricular hypertrophy, moderately reduced left ventricular systolic function with marked regional wall motion abnormalities as described below. Ejection fraction is estimated as around 38%. 2. Normal right ventricular size and systolic function. 3. Mild left atrial cavity enlargement. 4. Aortic valve sclerosis, trace aortic valve regurgitation. 5. Mitral valve sclerosis with some focal calcification, mild mitral valve regurgitation. 6. Mild tricuspid and trace pulmonic valve regurgitation. 7. No obvious pulmonary hypertension. 8. Varying degrees of trace to small circumferential pericardial effusion without echocardiographic evidence of cardiac tamponade. Anesthesia Plan  ASA Score- 4     Anesthesia Type- general with ASA Monitors. Additional Monitors:   Airway Plan:           Plan Factors-Exercise tolerance (METS): >4 METS. Chart reviewed. EKG reviewed. Existing labs reviewed. Patient summary reviewed. Patient is not a current smoker. Patient not instructed to abstain from smoking on day of procedure. Patient did not smoke on day of surgery. Obstructive sleep apnea risk education given perioperatively. Induction-     Postoperative Plan-     Informed Consent- Anesthetic plan and risks discussed with patient.

## 2023-07-03 NOTE — ANESTHESIA POSTPROCEDURE EVALUATION
Post-Op Assessment Note    CV Status:  Stable    Pain management: adequate     Mental Status:  Awake   Hydration Status:  Stable   PONV Controlled:  Controlled   Airway Patency:  Patent      Post Op Vitals Reviewed: Yes      Staff: Anesthesiologist         No notable events documented.     BP      Temp      Pulse     Resp      SpO2      /58   Pulse 64   Temp 97.9 °F (36.6 °C)   Resp 17   Ht 5' 7" (1.702 m)   Wt 111 kg (244 lb 14.9 oz)   SpO2 98%   BMI 38.36 kg/m²
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
## 2023-07-03 NOTE — DISCHARGE INSTR - AVS FIRST PAGE
DISCHARGE INSTRUCTIONS  DIALYSIS FISTULA SURGERY    ACTIVITY:  Limit use of the operated arm to what is necessary for the first day after surgery. On the second day after surgery, you may start to increase use of your arm as tolerated. Avoid heavy lifting (no more than 15 lbs) for the first one week. You should start to exercise your hand on the side of the fistula by squeezing a stress ball or a rolled-up sock. This increases blood flow in your fistula and arm so your fistula will function better. Feel for a thrill every day. The thrill is the vibration or pulse you feel over the fistula that means the blood is flowing through it. If you cannot feel a thrill, call our office ([phone]). DIET:   Resume your normal diet. Good nutrition is important for healing of your incision. DRESSING:   You may have surgical glue at your surgical site. There are stitches present under the skin which will absorb on their own. The glue is used to cover the incision, assist in closure, and prevent contamination. This adhesive will darken and peel away on its own within one to two weeks. Do not pick at it. If you have a dressing over your surgical site, remove this on the second day after surgery. INCISION:   If you do not have a dialysis catheter in place, you may shower and get your incision wet. Wash incision daily with soap and water, but do not rub or scrub the incision; rinse thoroughly and pat dry. You may have stitches or staples to close your incision and it is okay for these to get wet. Do not bathe in a tub or swim for the first 4 week following surgery or if you have any open wounds. It is normal to have mild swelling or discoloration around the incision. If increasing redness or pain develops, call our office immediately. Numbness in the region of the incision may occur following the surgery. This normally improves over six to twelve months.   If you have numbness or pain in your hand, please call our office immediately. DO NOT put any powders, creams, ointments, or lotions on your incision. ARM SWELLING:    Most patients have some noticeable arm swelling after surgery. This usually disappears within a few weeks. If swelling is present, elevate the arm whenever possible. RESTRICTIONS:   Do NOT have blood draws, IV's, or blood pressures performed on the operated arm. FISTULA USE:    Your fistula will not be used until it has fully matured - approximately 6 to 12 weeks. If you are using a catheter for dialysis, this will not be removed until after your fistula has matured and is being used for dialysis without any issues. FOLLOW UP STUDIES:  A Doppler ultrasound will be performed about 5-6 weeks after surgery. Your surgeon will arrange this at your first postoperative visit. FOLLOW UP APPOINTMENTS:  Making and keeping follow up appointments and ultrasound tests are important to your recovery. If you have difficulty making it to or keeping your follow up appointments, call the office. If you have increased pain, fever >101.5, increased drainage, redness or a bad smell at your surgery site, new coldness/numbness of your arm or leg, please call us immediately and GO directly to the ER. PLEASE CALL THE OFFICE IF YOU HAVE ANY QUESTIONS  [phone]  [phone]  [address] [facility], [address], [place]), [address] [facility]  [address] [facility],  [address] [facility] [address]  [address], [employer], [address] [facility]  [address] [facility], [address] [facility], [facility], [address]  [address] [facility],Sixth [address], 1st Floor, [place], [address]  [address], [address], [address], [place], [address]  [address] [place] [id number] [facility] [place] ([place]), [address] [facility]  [address] [facility], [address] [facility], [address] [facility], [facility] [address]  [address] [facility] [place] [facility], [facility]  [address]

## 2023-07-05 ENCOUNTER — PATIENT OUTREACH (OUTPATIENT)
Dept: FAMILY MEDICINE CLINIC | Facility: CLINIC | Age: 63
End: 2023-07-05

## 2023-07-05 ENCOUNTER — OFFICE VISIT (OUTPATIENT)
Dept: FAMILY MEDICINE CLINIC | Facility: CLINIC | Age: 63
End: 2023-07-05

## 2023-07-05 ENCOUNTER — TELEPHONE (OUTPATIENT)
Dept: VASCULAR SURGERY | Facility: CLINIC | Age: 63
End: 2023-07-05

## 2023-07-05 VITALS
SYSTOLIC BLOOD PRESSURE: 132 MMHG | RESPIRATION RATE: 18 BRPM | OXYGEN SATURATION: 99 % | HEART RATE: 80 BPM | HEIGHT: 67 IN | DIASTOLIC BLOOD PRESSURE: 86 MMHG | WEIGHT: 247 LBS | BODY MASS INDEX: 38.77 KG/M2 | TEMPERATURE: 97.5 F

## 2023-07-05 DIAGNOSIS — Z78.9 NEED FOR FOLLOW-UP BY SOCIAL WORKER: Primary | ICD-10-CM

## 2023-07-05 DIAGNOSIS — R41.3 MEMORY PROBLEM: ICD-10-CM

## 2023-07-05 DIAGNOSIS — Z99.2 TYPE 2 DIABETES MELLITUS WITH CHRONIC KIDNEY DISEASE ON CHRONIC DIALYSIS, WITH LONG-TERM CURRENT USE OF INSULIN (HCC): Primary | ICD-10-CM

## 2023-07-05 DIAGNOSIS — Z79.4 TYPE 2 DIABETES MELLITUS WITH CHRONIC KIDNEY DISEASE ON CHRONIC DIALYSIS, WITH LONG-TERM CURRENT USE OF INSULIN (HCC): Primary | ICD-10-CM

## 2023-07-05 DIAGNOSIS — E11.22 TYPE 2 DIABETES MELLITUS WITH CHRONIC KIDNEY DISEASE ON CHRONIC DIALYSIS, WITH LONG-TERM CURRENT USE OF INSULIN (HCC): Primary | ICD-10-CM

## 2023-07-05 DIAGNOSIS — E11.621 TYPE 2 DIABETES MELLITUS WITH FOOT ULCER, WITH LONG-TERM CURRENT USE OF INSULIN (HCC): ICD-10-CM

## 2023-07-05 DIAGNOSIS — J44.9 CHRONIC OBSTRUCTIVE PULMONARY DISEASE, UNSPECIFIED COPD TYPE (HCC): ICD-10-CM

## 2023-07-05 DIAGNOSIS — R41.3 MEMORY CHANGES: ICD-10-CM

## 2023-07-05 DIAGNOSIS — N18.6 TYPE 2 DIABETES MELLITUS WITH CHRONIC KIDNEY DISEASE ON CHRONIC DIALYSIS, WITH LONG-TERM CURRENT USE OF INSULIN (HCC): Primary | ICD-10-CM

## 2023-07-05 DIAGNOSIS — I10 PRIMARY HYPERTENSION: ICD-10-CM

## 2023-07-05 DIAGNOSIS — Z79.4 TYPE 2 DIABETES MELLITUS WITH FOOT ULCER, WITH LONG-TERM CURRENT USE OF INSULIN (HCC): ICD-10-CM

## 2023-07-05 DIAGNOSIS — L97.509 TYPE 2 DIABETES MELLITUS WITH FOOT ULCER, WITH LONG-TERM CURRENT USE OF INSULIN (HCC): ICD-10-CM

## 2023-07-05 LAB — SL AMB POCT HEMOGLOBIN AIC: 6.6 (ref ?–6.5)

## 2023-07-05 PROCEDURE — 99214 OFFICE O/P EST MOD 30 MIN: CPT | Performed by: NURSE PRACTITIONER

## 2023-07-05 PROCEDURE — 83036 HEMOGLOBIN GLYCOSYLATED A1C: CPT | Performed by: NURSE PRACTITIONER

## 2023-07-05 RX ORDER — BLOOD-GLUCOSE METER
KIT MISCELLANEOUS
Qty: 1 KIT | Refills: 0 | Status: SHIPPED | OUTPATIENT
Start: 2023-07-05

## 2023-07-05 RX ORDER — BLOOD SUGAR DIAGNOSTIC
STRIP MISCELLANEOUS
Qty: 100 EACH | Refills: 3 | Status: SHIPPED | OUTPATIENT
Start: 2023-07-05

## 2023-07-05 RX ORDER — LANCETS 33 GAUGE
EACH MISCELLANEOUS
Qty: 100 EACH | Refills: 3 | Status: SHIPPED | OUTPATIENT
Start: 2023-07-05

## 2023-07-05 NOTE — ASSESSMENT & PLAN NOTE
Lab Results   Component Value Date    HGBA1C 6.6 (A) 07/05/2023     Continue current regimen, Lantus 25 units bid and Humalog 10 units tid, and Trulicity 1.5 mg weekly

## 2023-07-05 NOTE — PROGRESS NOTES
I called the patient to follow up after her recent procedure for revision AV fistula. She states the area is tender but denies any fever or signs of infection. The patient is aware of her PCP appointment later today and plans on attending. I will continue to follow. Chart reviewed.

## 2023-07-05 NOTE — PATIENT INSTRUCTIONS
Details  Date Type Department Care Team Description   06/27/2023 Orders Only [facility] - [address], [place] [address] [phone]   Vasu Rankin LCSW   ESRD (end stage renal disease) ([address] ) (Primary Dx); Pre-transplant evaluation for kidney transplant; Anxiety and depression           Memory Loss in Older Adults   AMBULATORY CARE:   Some memory loss  is common with aging. You may have sharp long-term memories from many years ago but have trouble remembering new information. Normal memory loss does not get worse and does not affect daily activities. Memory loss that gets worse over time or affects daily activities can be a sign of a serious medical problem, such as Alzheimer disease. Talk with your healthcare provider if you or someone close to you notices that your memory is worsening. Signs and symptoms of memory loss that may happen with aging:   Not remembering where you put your keys or glasses    Trouble recalling a familiar person's name, but then remembering it    Trouble remembering why you walked into a room    Missing an appointment because you forgot about it    Forgetting someone's birthday or an anniversary    Signs and symptoms of severe memory loss:   The following may be signs of a more serious health problem that needs treatment:  Not knowing how to do something you used to do    Trouble learning new facts, or trouble learning skills that need you to remember steps    Trouble following directions, or getting lost, even in an area you know    Not remembering if you took your medicine or finished a task    Not being able to remember events from your past, such as a trip you took    Thinking events that happened years ago happened recently    Forgetting to bathe, brush your teeth, or do other daily care tasks    Not recognizing a family member or friend you have known a long time    You or someone close to you should contact your healthcare provider if:   You have new, sudden, or worsening memory problems. You have questions or concerns about your condition or care. Follow up with your healthcare provider as directed: You may need to have regular memory tests to check for new or worsening problems. Write down your questions so you remember to ask them during your visits. Manage memory loss:  Some memory loss cannot be treated, but you may be able to stop it from getting worse. Your healthcare provider may need to stop or change certain medicines you are taking, or change the dose. The provider may also recommend vitamins or supplements to help improve your memory. The following are ways to help manage memory loss:  Ask someone to help you if needed. Ask the person to help you create lists of things you need to do or set up medicine reminders. The person might be able to call you to remind you of an upcoming task, event, or anniversary. Find a place for items you use often. You may be able to remember where your keys, wallet, glasses, or other items are if you create a place for each item. It may also help if you say that you are returning an item to its place. This may trigger your memory later when you are looking for the item. Set up reminders. Calendars, timers, or alarm clocks can help you remember to do tasks such as taking your medicine. Some medicine dispensers can be set to sound an alarm when it is time to take the medicine. Containers are also available that are labelled for each day of the week. These containers can help you remember if you need to take the medicine or already took it. You may be able to set up reminders with your bank to help you remember to pay your bills on time. Write down anything you need to remember. Examples include upcoming healthcare appointments and medication refills. Put the reminders where you will see them, such as on your bathroom mirror or refrigerator.  You may want to make a list of things you need to do each day. You can cross the item off when the task is finished. Create a quiet learning environment. This may help you remember new information more easily. Try to find a place where you will not be distracted by noise, light, or other people. Go slowly and repeat the information to help you remember. Prevent your memory loss from getting worse:   Play brain games. Games such as crossword puzzles, jigsaw puzzles, or math games can help sharpen your memory. Spend time with other people. This can help strengthen your memory, especially if you talk about past or upcoming events. Take a class to learn something new. This will help you think in new ways and make your memory work harder. Eat a variety of healthy foods. Healthy foods include fruits, vegetables, low-fat dairy products, lean meats, fish, whole-grain breads, and cooked beans. Eat more foods with high amounts of omega-3 fatty acids. Examples include salmon, tuna, walnuts, and flaxseeds. Ask your healthcare provider for a list of foods that contain fatty acids and how much you should eat each day. Exercise regularly. Exercise improves blood flow and can help you think and remember more easily. Most healthy adults should try to get at least 30 minutes of exercise on most days of the week. Ask your healthcare provider how much exercise you need and which exercises are best for you. Create a sleep routine. Try to go to bed and wake up at the same times each day. Sleep is important for memory. Talk to your healthcare provider if you are having trouble sleeping. Do not smoke. Nicotine and other chemicals in cigarettes and cigars can reduce the amount of oxygen going to your brain. This can make memory problems worse. Ask your healthcare provider for information if you currently smoke and need help to quit. E-cigarettes or smokeless tobacco still contain nicotine.  Talk to your healthcare provider before you use these products. Limit or do not drink alcohol. Alcohol can lead to both short-term and long-term memory problems. Ask your healthcare provider if alcohol is safe for you, and how much is safe to drink. © Copyright [employer] 2022 Information is for End User's use only and may not be sold, redistributed or otherwise used for commercial purposes. The above information is an  only. It is not intended as medical advice for individual conditions or treatments. Talk to your doctor, nurse or pharmacist before following any medical regimen to see if it is safe and effective for you.

## 2023-07-05 NOTE — ASSESSMENT & PLAN NOTE
Lab Results   Component Value Date    THQ1XIPEMHPK 2.364 01/16/2023     Continue levothyroxine 50 mcg daily

## 2023-07-05 NOTE — PROGRESS NOTES
Name: Bebeto Mena      : 1960      MRN: [id number]  Encounter Provider: CHERELLE Mabry  Encounter Date: 2023   Encounter department: [place] [facility]    Assessment & Plan     1. Type 2 diabetes mellitus with chronic kidney disease on chronic dialysis, with long-term current use of insulin (HCC)  Assessment & Plan:    Lab Results   Component Value Date    HGBA1C 6.6 (A) 2023     Continue current regimen, Lantus 25 units bid and Humalog 10 units tid, and Trulicity 1.5 mg weekly       2. Type 2 diabetes mellitus with foot ulcer, with long-term current use of insulin (HCC)  -     POCT hemoglobin A1c  -     Blood Glucose Monitoring Suppl (OneTouch Verio Reflect) w/Device KIT; Check blood sugars once daily. Please substitute with appropriate alternative as covered by patient's insurance. Dx: E11.65  -     glucose blood (OneTouch Verio) test strip; Check blood sugars once daily. Please substitute with appropriate alternative as covered by patient's insurance. Dx: E11.65  -     OneTouch Delica Lancets 43E MISC; Check blood sugars once daily. Please substitute with appropriate alternative as covered by patient's insurance. Dx: E11.65    3. Memory changes  -     Ambulatory Referral to Neurology; Future    4. Chronic obstructive pulmonary disease, unspecified COPD type ([address])    5. Primary hypertension  Assessment & Plan:  BP is within goal, continue with carvedilol 25 mg bid, losartan 25 mg daily       6.  Memory problem  Assessment & Plan:  Recommend word searches, crossword puzzles, journaling   Referral to neurology            Subjective     Bebeto Mena is a 63 y.o. female who  has a past medical history of Abnormal liver function, Anemia, Anxiety, Arthritis, Chronic kidney disease, Chronic narcotic dependence ([address]), Chronic pain disorder, Coronary artery disease, Depression, Diabetes mellitus ([address]), Elevated troponin, GERD (gastroesophageal reflux disease), Heart murmur, Hyperlipidemia, Hypertension, Neurogenic bladder, Open toe wound, Renal disorder, Shortness of breath, Sleep apnea, and Suprapubic catheter ([address] who presented to the office today for follow up. she reports that she is having trouble with her short term memory. She is having trouble remembering appointments, phone calls, etc. She had revision of the left AV fistula performed yesterday. incision is CDI. Review of Systems   Constitutional: Positive for fatigue. Negative for chills and fever. Respiratory: Negative for cough, choking and shortness of breath. Cardiovascular: Negative for chest pain, palpitations and leg swelling. Musculoskeletal: Positive for arthralgias, back pain, gait problem, joint swelling and myalgias. Skin: Positive for wound. Neurological: Negative for seizures, syncope and weakness.        Past Medical History:   Diagnosis Date   • Abnormal liver function    • Anemia    • Anxiety    • Arthritis    • CHF (congestive heart failure) ([facility])    • Chronic kidney disease     stage 3   • Chronic narcotic dependence ([facility])    • Chronic pain disorder     lower back, hands , neck and knees   • Coronary artery disease    • Depression    • Diabetes mellitus ([address])    • Elevated troponin 02/11/2022   • GERD (gastroesophageal reflux disease)     no meds at present   • Heart murmur     murmur   • Hyperlipidemia    • Hypertension    • Neurogenic bladder    • Open toe wound 12/2020    right big toe open calus but is dry at present   • PONV (postoperative nausea and vomiting)    • Renal disorder    • Shortness of breath     exertion   • Skin ulcer of hand, limited to breakdown of skin ([address]) 02/25/2021   • Sleep apnea     doesn't use cpap   • Suprapubic catheter [facility])    • Urinary retention      Past Surgical History:   Procedure Laterality Date   • AMPUTATION Left     2nd toe   • ANGIOPLASTY  2017 5   • BREAST EXCISIONAL BIOPSY Left    • BREAST SURGERY     • CARDIAC CATHETERIZATION     • CARDIAC CATHETERIZATION N/A 07/01/2022    Procedure: Cardiac catheterization;  Surgeon: Bunny Lozada MD;  Location: [facility]ARD[facility]; Service: Cardiology   • CARDIAC CATHETERIZATION N/A 07/01/2022    Procedure: Cardiac pci;  Surgeon: Bunny Lozada MD;  Location: [facility] CARDI[facility]; Service: Cardiology   • CARPAL TUNNEL RELEASE Bilateral    • CERVICAL FUSION     • HYSTERECTOMY  2008   • IR AV FISTULAGRAM/GRAFTOGRAM  04/26/2023   • IR SUPRAPUBIC CATHETER PLACEMENT  06/15/2021   • IR SUPRAPUBIC CATHETER PLACEMENT  02/14/2023   • IR TUNNELED CENTRAL LINE PLACEMENT  04/04/2023   • IR TUNNELED DIALYSIS CATHETER PLACEMENT  07/15/2022   • IR TUNNELED DIALYSIS CATHETER PLACEMENT  12/12/2022   • KNEE SURGERY     • OOPHORECTOMY  2008   • IA ARTERIOVENOUS ANASTOMOSIS OPEN DIRECT Left 02/06/2023    Procedure: CREATION FISTULA  ARTERIOVENOUS (AV);   Surgeon: Yee Reed DO;  Location: [facility];  Service: Vascular   • IA EXC B9 LESION MRGN XCP SK TG S/N/H/F/G 3.1-4.0CM N/A 12/21/2020    Procedure: EXCISION SEBACEOUS CYST X 5 SCALP;  Surgeon: Cali Morales MD;  Location:  MAIN OR;  Service: General   • TOE AMPUTATION Left    • TRIGGER FINGER RELEASE Right     4th Finger     Family History   Problem Relation Age of Onset   • Stroke Father    • Heart disease Father    • No Known Problems Mother    • No Known Problems Sister    • No Known Problems Daughter    • No Known Problems Maternal Grandmother    • No Known Problems Maternal Grandfather    • No Known Problems Paternal Grandmother    • No Known Problems Paternal Grandfather    • No Known Problems Maternal Aunt    • No Known Problems Maternal Aunt    • No Known Problems Maternal Aunt    • No Known Problems Maternal Aunt    • No Known Problems Maternal Aunt    • No Known Problems Maternal Aunt    • No Known Problems Paternal Aunt      Social History     Socioeconomic History   • Marital status:      Spouse name: None   • Number of children: None   • Years of education: None   • Highest education level: None   Occupational History   • None   Tobacco Use   • Smoking status: Former     Packs/day: 1.00     Years: 35.00     Total pack years: 35.00     Types: Cigarettes     Quit date:      Years since quittin.5   • Smokeless tobacco: Never   Vaping Use   • Vaping Use: Never used   Substance and Sexual Activity   • Alcohol use: Not Currently   • Drug use: Never   • Sexual activity: Not Currently   Other Topics Concern   • None   Social History Narrative   • None     Social Determinants of Health     Financial Resource Strain: Low Risk  (2022)    Overall Financial Resource Strain (CARDIA)    • Difficulty of Paying Living Expenses: Not hard at all   Food Insecurity: No Food Insecurity (2022)    Hunger Vital Sign    • Worried About Running Out of Food in the Last Year: Never true    • Ran Out of Food in the Last Year: Never true   Transportation Needs: No Transportation Needs (2022)    PRAPARE - Transportation    • Lack of Transportation (Medical): No    • Lack of Transportation (Non-Medical):  No   Physical Activity: Not on file   Stress: Not on file   Social Connections: Not on file   Intimate Partner Violence: Not on file   Housing Stability: Low Risk  (2022)    Housing Stability Vital Sign    • Unable to Pay for Housing in the Last Year: No    • Number of Places Lived in the Last Year: 1    • Unstable Housing in the Last Year: No     Current Outpatient Medications on File Prior to Visit   Medication Sig   • allopurinol (ZYLOPRIM) 100 mg tablet    • allopurinol 200 MG TABS Take 200 mg by mouth daily   • aspirin (ECOTRIN LOW STRENGTH) 81 mg EC tablet Take 1 tablet (81 mg total) by mouth daily   • atorvastatin (LIPITOR) 40 mg tablet Take 1 tablet (40 mg total) by mouth daily   • calcitriol (ROCALTROL) 0.5 MCG capsule Take 1 capsule (0.5 mcg total) by mouth 3 (three) times a week   • carvedilol (COREG) 25 mg tablet Take 1 tablet (25 mg total) by mouth 2 (two) times a day with meals   • citalopram (CeleXA) 20 mg tablet Take 1 tablet (20 mg total) by mouth daily   • clopidogrel (PLAVIX) 75 mg tablet Take 1 tablet (75 mg total) by mouth daily   • Deep Sea Nasal Spray 0.65 % nasal spray INSTILL 1 SPRAY INTO EACH NOSTRIL AS NEEDED FOR CONGESTION   • diphenhydrAMINE (BENADRYL) 25 mg capsule Take 25 mg by mouth every 4 (four) hours as needed   • ferrous sulfate 325 (65 Fe) mg tablet Take 325 mg by mouth daily (Patient not taking: Reported on 6/19/2023)   • furosemide (LASIX) 80 mg tablet Take 1 tablet (80 mg total) by mouth daily   • insulin lispro (HumaLOG) 100 units/mL injection pen INJECT 20 UNITS UNDER THE SKIN 3 (THREE) TIMES A DAY WITH MEALS   • isosorbide mononitrate (IMDUR) 30 mg 24 hr tablet TAKE 1 TABLET (30 MG TOTAL) BY MOUTH EVERY EVENING   • ketoconazole (NIZORAL) 2 % cream Apply to suprapubic catheter site twice daily. • Lantus SoloStar 100 units/mL SOPN INJECT 50 UNITS UNDER THE SKIN EVERY 12 (TWELVE) HOURS   • levothyroxine 50 mcg tablet TAKE 1 TABLET (50 MCG TOTAL) BY MOUTH DAILY   • lidocaine (LMX) 4 % cream Apply topically as needed for mild pain   • losartan (COZAAR) 25 mg tablet Take 1 tablet (25 mg total) by mouth daily   • naloxone (NARCAN) 4 mg/0.1 mL nasal spray Administer 1 spray into a nostril. If breathing does not return to normal or if breathing difficulty resumes after 2-3 minutes, give another dose in the other nostril using a new spray.  (Patient not taking: Reported on 5/23/2023)   • nitroglycerin (NITROSTAT) 0.4 mg SL tablet Place 1 tablet (0.4 mg total) under the tongue every 5 (five) minutes as needed for chest pain (Patient not taking: Reported on 6/19/2023)   • nystatin (MYCOSTATIN) powder Apply topically 2 (two) times a day   • OLANZapine (ZyPREXA) 5 mg tablet TAKE 1 TABLET (5 MG TOTAL) BY MOUTH DAILY AT BEDTIME   • ondansetron (ZOFRAN) 4 mg tablet Take 1 tablet (4 mg total) by mouth every 8 (eight) hours as needed for nausea or vomiting (Patient not taking: Reported on 6/19/2023)   • oxyCODONE (ROXICODONE) 5 immediate release tablet Take 1.5 tablets (7.5 mg total) by mouth every 8 (eight) hours as needed for severe pain 30 days supply Max Daily Amount: 22.5 mg   • pantoprazole (PROTONIX) 40 mg tablet Take 1 tablet (40 mg total) by mouth daily   • ranitidine (ZANTAC) 300 MG tablet Take 300 mg by mouth daily   • sevelamer carbonate (RENVELA) 800 mg tablet Take 2 tablets (1,600 mg total) by mouth 3 (three) times a day with meals   • silver sulfadiazine (SILVADENE,SSD) 1 % cream Apply topically daily (Patient not taking: Reported on 3/31/2023)   • sodium chloride 0.9 % SOLN 100 mL with insulin regular 100 units/mL SOLN 100 Units Inject 20 Units under the skin 3 (three) times a day with meals (Patient not taking: Reported on 5/23/2023)   • tiZANidine (ZANAFLEX) 4 mg tablet TAKE 1 TABLET (4 MG TOTAL) BY MOUTH EVERY 8 (EIGHT) HOURS AS NEEDED FOR MUSCLE SPASMS   • Trulicity 1.5 ZT/7.1CH injection INJECT 0.5 ML (1.5 MG TOTAL) UNDER THE SKIN ONCE A WEEK   • [DISCONTINUED] oxygen gas Inhale 2 L/min continuous.  Indications: Respiratory Failure   • [DISCONTINUED] Torsemide 40 MG TABS Take 40 mg by mouth daily     Allergies   Allergen Reactions   • Latex Itching   • Codeine GI Intolerance     Immunization History   Administered Date(s) Administered   • COVID-19 MODERNA VACC 0.5 ML IM 04/07/2021, 05/10/2021   • COVID-19 Pfizer Vac BIVALENT Jose J-sucrose 12 Yr+ IM (BOOSTER ONLY) 11/29/2022, 01/05/2023   • INFLUENZA 10/01/2017, 11/11/2019, 11/10/2020, 10/13/2021   • Influenza Quadrivalent Preservative Free 3 years and older IM 02/15/2013, 11/11/2019, 10/13/2021   • Influenza Split High Dose Preservative Free IM 10/01/2016   • Influenza, recombinant, quadrivalent,injectable, preservative free 11/10/2020, 10/13/2021   • Influenza, seasonal, injectable, preservative free 02/15/2013, 10/13/2021   • Pneumococcal Conjugate 13-Valent 12/16/2021   • Pneumococcal Polysaccharide PPV23 01/01/2012, 02/15/2013, 12/16/2021   • Tuberculin Skin Test-PPD Intradermal 07/20/2022   • Unknown 08/17/2022       Objective     /86 (BP Location: Right arm, Patient Position: Sitting, Cuff Size: Large)   Pulse 80   Temp 97.5 °F (36.4 °C) (Temporal)   Resp 18   Ht 5' 7" (1.702 m)   Wt 112 kg (247 lb)   SpO2 99%   BMI 38.69 kg/m²     Physical Exam  Vitals and nursing note reviewed. Constitutional:       Appearance: She is ill-appearing. HENT:      Right Ear: External ear normal.      Left Ear: External ear normal.   Eyes:      General:         Right eye: No discharge. Left eye: No discharge. Cardiovascular:      Rate and Rhythm: Normal rate and regular rhythm. Pulmonary:      Effort: Pulmonary effort is normal. No respiratory distress. Comments: RCW catheter   Abdominal:      General: Bowel sounds are normal. There is no distension. Tenderness: There is no abdominal tenderness. Genitourinary:     Comments: +suprapubic catheter  Musculoskeletal:      Right lower leg: Edema present. Left lower leg: Edema present. Skin:     Comments: Left upper arm surgical incision CDI   Neurological:      Mental Status: She is alert and oriented to person, place, and time.    Psychiatric:         Behavior: Behavior normal.       Cristofer Chinchilla

## 2023-07-05 NOTE — TELEPHONE ENCOUNTER
Vascular Nurse [facility] Op Call    Procedure: Left - REVISION AV FISTULA      Date of Procedure:  7/3/23    Surgeon:    * Severo Baeza DO - Primary     * Sarah Rodriguez - Assisting      Painful tingling or numbness in your fingers?: No    Paleness/Coolness in hands/fingers?: No    Redness, swelling or pus from your wound?: No    Bleeding?: No    Thrill present?: Yes    Anticoagulation pt was discharged on post op?: Aspirin and Clopidogrel (Plavix)    Statin pt was discharged on post op?:  Lipitor (atorvastatin)    Fever/chills?: No    Uncontrolled Pain?: No      Reviewed discharge instructions and incision care with patient. Dialysis Days and Location:  [facility] [place] OFFICE VISIT:  Not scheduled at time of call    Transportation Confirmed?: Yes      Any Questions or Concerns? Patient stated that she is doing good since procedure. She stated that her arm is tender and somewhat swollen. She stated that she is taking pain medication as prescribed which makes it tolerable. She is elevating her arm. Reviewed incision care with her - wash daily with soap and water. She informed that she still has the dressing on from day of procedure. Advised her that she can remove this and she removed while on the phone. She stated that the incision looks good and without any signs of infection, but is bruised. Reviewed discharge medications - Aspirin and Plavix. All questions answered. No concerns expressed at this time. Informed her that she does not have a post op appointment. She was agreeable to make appointment and call was transferred to [facility] in the Call Center to schedule appointment.

## 2023-07-06 DIAGNOSIS — M10.9 GOUT, UNSPECIFIED CAUSE, UNSPECIFIED CHRONICITY, UNSPECIFIED SITE: ICD-10-CM

## 2023-07-07 RX ORDER — ALLOPURINOL 100 MG/1
TABLET ORAL
Qty: 180 TABLET | Refills: 1 | Status: SHIPPED | OUTPATIENT
Start: 2023-07-07

## 2023-07-19 ENCOUNTER — OFFICE VISIT (OUTPATIENT)
Dept: VASCULAR SURGERY | Facility: CLINIC | Age: 63
End: 2023-07-19

## 2023-07-19 VITALS
SYSTOLIC BLOOD PRESSURE: 138 MMHG | HEART RATE: 76 BPM | BODY MASS INDEX: 39.21 KG/M2 | OXYGEN SATURATION: 96 % | DIASTOLIC BLOOD PRESSURE: 60 MMHG | WEIGHT: 249.8 LBS | HEIGHT: 67 IN

## 2023-07-19 DIAGNOSIS — N18.6 ESRD (END STAGE RENAL DISEASE) (HCC): Primary | ICD-10-CM

## 2023-07-19 PROCEDURE — 99024 POSTOP FOLLOW-UP VISIT: CPT | Performed by: SURGERY

## 2023-07-19 NOTE — ASSESSMENT & PLAN NOTE
Lab Results   Component Value Date    EGFR 14 02/06/2023    EGFR 21 01/17/2023    EGFR 23 01/16/2023    CREATININE 3.28 (H) 02/06/2023    CREATININE 2.38 (H) 01/17/2023    CREATININE 2.25 (H) 01/16/2023   s/p revision/superificialization of left brachiocephalic AVF   Incision is healing well. Excellent thrill/bruit    Likely ok to access in 2-3 weeks.     Please call with any questions/concerns

## 2023-07-22 DIAGNOSIS — F41.9 INSOMNIA SECONDARY TO ANXIETY: ICD-10-CM

## 2023-07-22 DIAGNOSIS — Z79.4 CURRENT USE OF INSULIN (HCC): ICD-10-CM

## 2023-07-22 DIAGNOSIS — F51.05 INSOMNIA SECONDARY TO ANXIETY: ICD-10-CM

## 2023-07-22 DIAGNOSIS — I10 PRIMARY HYPERTENSION: ICD-10-CM

## 2023-07-22 DIAGNOSIS — Z79.4 TYPE 2 DIABETES MELLITUS WITH DIABETIC POLYNEUROPATHY, WITH LONG-TERM CURRENT USE OF INSULIN (HCC): ICD-10-CM

## 2023-07-22 DIAGNOSIS — N18.32 STAGE 3B CHRONIC KIDNEY DISEASE (HCC): ICD-10-CM

## 2023-07-22 DIAGNOSIS — I25.10 CORONARY ARTERY DISEASE INVOLVING NATIVE HEART WITHOUT ANGINA PECTORIS, UNSPECIFIED VESSEL OR LESION TYPE: ICD-10-CM

## 2023-07-22 DIAGNOSIS — E11.42 TYPE 2 DIABETES MELLITUS WITH DIABETIC POLYNEUROPATHY, WITH LONG-TERM CURRENT USE OF INSULIN (HCC): ICD-10-CM

## 2023-07-22 DIAGNOSIS — E03.9 HYPOTHYROIDISM, UNSPECIFIED TYPE: ICD-10-CM

## 2023-07-22 RX ORDER — OLANZAPINE 5 MG/1
5 TABLET ORAL
Qty: 90 TABLET | Refills: 0 | Status: SHIPPED | OUTPATIENT
Start: 2023-07-22

## 2023-07-22 RX ORDER — CITALOPRAM 20 MG/1
20 TABLET ORAL DAILY
Qty: 90 TABLET | Refills: 0 | Status: SHIPPED | OUTPATIENT
Start: 2023-07-22

## 2023-07-22 RX ORDER — CLOPIDOGREL BISULFATE 75 MG/1
75 TABLET ORAL DAILY
Qty: 90 TABLET | Refills: 0 | Status: SHIPPED | OUTPATIENT
Start: 2023-07-22

## 2023-07-22 RX ORDER — HYDRALAZINE HYDROCHLORIDE 25 MG/1
25 TABLET, FILM COATED ORAL 3 TIMES DAILY
Qty: 90 TABLET | Refills: 0 | Status: SHIPPED | OUTPATIENT
Start: 2023-07-22

## 2023-07-22 RX ORDER — LEVOTHYROXINE SODIUM 0.05 MG/1
50 TABLET ORAL DAILY
Qty: 90 TABLET | Refills: 0 | Status: SHIPPED | OUTPATIENT
Start: 2023-07-22

## 2023-07-26 ENCOUNTER — PATIENT OUTREACH (OUTPATIENT)
Dept: FAMILY MEDICINE CLINIC | Facility: CLINIC | Age: 63
End: 2023-07-26

## 2023-07-26 DIAGNOSIS — F11.20 CONTINUOUS OPIOID DEPENDENCE (HCC): ICD-10-CM

## 2023-07-26 DIAGNOSIS — G89.4 CHRONIC PAIN SYNDROME: ICD-10-CM

## 2023-07-26 NOTE — PROGRESS NOTES
I called the patient but received voicemail. Message was left reminding her of her Uro appointment 7/28 at 1100 and that I will call her again next week. The patient called back and left a message on my VM asking for med RF; submitted to PCP.

## 2023-07-27 RX ORDER — OXYCODONE HYDROCHLORIDE 5 MG/1
7.5 TABLET ORAL EVERY 8 HOURS PRN
Qty: 120 TABLET | Refills: 0 | Status: SHIPPED | OUTPATIENT
Start: 2023-07-27

## 2023-07-28 DIAGNOSIS — E11.42 TYPE 2 DIABETES MELLITUS WITH DIABETIC POLYNEUROPATHY, WITH LONG-TERM CURRENT USE OF INSULIN (HCC): ICD-10-CM

## 2023-07-28 DIAGNOSIS — Z79.4 TYPE 2 DIABETES MELLITUS WITH DIABETIC POLYNEUROPATHY, WITH LONG-TERM CURRENT USE OF INSULIN (HCC): ICD-10-CM

## 2023-07-28 DIAGNOSIS — N18.32 STAGE 3B CHRONIC KIDNEY DISEASE (HCC): ICD-10-CM

## 2023-07-28 DIAGNOSIS — I25.10 CORONARY ARTERY DISEASE INVOLVING NATIVE HEART WITHOUT ANGINA PECTORIS, UNSPECIFIED VESSEL OR LESION TYPE: ICD-10-CM

## 2023-07-28 DIAGNOSIS — Z79.4 CURRENT USE OF INSULIN (HCC): ICD-10-CM

## 2023-07-31 ENCOUNTER — PROCEDURE VISIT (OUTPATIENT)
Dept: UROLOGY | Facility: CLINIC | Age: 63
End: 2023-07-31
Payer: COMMERCIAL

## 2023-07-31 VITALS
RESPIRATION RATE: 20 BRPM | SYSTOLIC BLOOD PRESSURE: 140 MMHG | DIASTOLIC BLOOD PRESSURE: 60 MMHG | BODY MASS INDEX: 39.55 KG/M2 | WEIGHT: 252 LBS | HEIGHT: 67 IN | HEART RATE: 60 BPM

## 2023-07-31 DIAGNOSIS — N31.9 NEUROGENIC BLADDER: Primary | ICD-10-CM

## 2023-07-31 PROCEDURE — 51705 CHANGE OF BLADDER TUBE: CPT

## 2023-07-31 NOTE — PROGRESS NOTES
7/31/2023    Manjinder Espinoza  1960  [id number]    Diagnosis  Chief Complaint    Neurogenic Bladder         Patient presents for SPT change managed by Corby    Plan  Follow up in 6 weeks for SPT change    Procedure: Suprapubic Tube Change         Cystostomy tube change     Date/Time 7/31/2023 11:00 AM     Performed by  Ariane Riggs RN   Authorized by Erika Chiu MD     Universal Protocol   Consent: Verbal consent obtained. Consent given by: patient       Procedure Details   Patient tolerance: patient tolerated the procedure well with no immediate complications               Current catheter removed without difficulty after deflation of an intact balloon. Site prepped with Hibiclens, new 16F  suprapubic spt change via aseptic technique without incident, 10 ml balloon inflated with sterile water. irrigated easily for pink-tinged return, mild spasm noted. Patient tolerated well. Attached to drainage bag.    16 fr silicone placed  Vitals:    07/31/23 1103   BP: 140/60   Pulse: 60   Resp: 20   Weight: 114 kg (252 lb)   Height: 5' 7" (1.702 m)         Ariane Riggs RN

## 2023-07-31 NOTE — ASSESSMENT & PLAN NOTE
· Insulin-dependent type 2 diabetic with polyneuropathy and neurogenic bladder managed with suprapubic tube  · 16 Fr SPT exchanged yesterday by Jori Kramer PA-C   · 1200 cc output overnight  Currently patent draining clear yellow urine  · Creatinine trending down from 2 6 yesterday to 2 1 today  · US kidney and bladder ordered yesterday by Dr Bria Thomas for further evaluation of upper tract secondary to bump in creatinine  · Will follow up with results  · Continue to monitor  · Nephrology following  · Patient due for cardiac catheterization today  · Postponed from yesterday secondary to bump in creatinine  · Continue IV Abx per primary team for positive urine culture 8/23  Bilobed Transposition Flap Text: The defect edges were debeveled with a #15 scalpel blade. Given the location of the defect and the proximity to free margins a bilobed transposition flap was deemed most appropriate. Using a sterile surgical marker, an appropriate bilobe flap drawn around the defect. The area thus outlined was incised deep to adipose tissue with a #15 scalpel blade. The skin margins were undermined to an appropriate distance in all directions utilizing iris scissors. Following this, the designed flap was carried over into the primary defect and sutured into place.

## 2023-08-01 RX ORDER — ASPIRIN 81 MG/1
81 TABLET, COATED ORAL DAILY
Qty: 90 TABLET | Refills: 0 | Status: SHIPPED | OUTPATIENT
Start: 2023-08-01

## 2023-08-02 ENCOUNTER — PATIENT OUTREACH (OUTPATIENT)
Dept: FAMILY MEDICINE CLINIC | Facility: CLINIC | Age: 63
End: 2023-08-02

## 2023-08-02 NOTE — PROGRESS NOTES
I called the patient but received voicemail. Message was left asking if she is keeping her 8/8 PCP appointment. Her last PCP appointment (7/5)  asked the patient to return in 3 months (scheduled for 10/6). I asked her to call myself or the office to let us know. I will continue further outreach.

## 2023-08-26 DIAGNOSIS — Z79.4 CURRENT USE OF INSULIN (HCC): ICD-10-CM

## 2023-08-26 DIAGNOSIS — N18.32 STAGE 3B CHRONIC KIDNEY DISEASE (HCC): ICD-10-CM

## 2023-08-26 DIAGNOSIS — E11.42 TYPE 2 DIABETES MELLITUS WITH DIABETIC POLYNEUROPATHY, WITH LONG-TERM CURRENT USE OF INSULIN (HCC): ICD-10-CM

## 2023-08-26 DIAGNOSIS — Z79.4 TYPE 2 DIABETES MELLITUS WITH DIABETIC POLYNEUROPATHY, WITH LONG-TERM CURRENT USE OF INSULIN (HCC): ICD-10-CM

## 2023-08-26 DIAGNOSIS — I25.10 CORONARY ARTERY DISEASE INVOLVING NATIVE HEART WITHOUT ANGINA PECTORIS, UNSPECIFIED VESSEL OR LESION TYPE: ICD-10-CM

## 2023-08-28 ENCOUNTER — PATIENT OUTREACH (OUTPATIENT)
Dept: FAMILY MEDICINE CLINIC | Facility: CLINIC | Age: 63
End: 2023-08-28

## 2023-08-28 DIAGNOSIS — G89.4 CHRONIC PAIN SYNDROME: ICD-10-CM

## 2023-08-28 DIAGNOSIS — F11.20 CONTINUOUS OPIOID DEPENDENCE (HCC): ICD-10-CM

## 2023-08-28 NOTE — TELEPHONE ENCOUNTER
Rec'd teams msg from [address] [place] [address] requesting coordination to obtain Cgm     Pt is on insulin with medicare.  Should be approved, but has to go thru DME    Sent dexcom g7 for approval to PCP via [employer]     Janeth DelaneyD, [facility] Clinical Pharmacist     ---------------------------------------    Pharmacist Tracking Tool  Reason For Outreach: Embedded Pharmacist  Demographics:  Intervention Method: Phone  Type of Intervention: New  Topics Addressed: Diabetes  Pharmacologic Interventions: Medication Initiation  Non-Pharmacologic Interventions: Personal CGM  Time:  Direct Patient Care: 0 mins  Care Coordination: 10 mins  Recommendation Recipient: Patient/Caregiver  Outcome: Accepted

## 2023-08-28 NOTE — PROGRESS NOTES
I received a call from the patient requesting med refill; submitted to PCP. The patient stated she is getting her port removed tomorrow and will finally be able to shower again. She is requesting a shower chair stating she cannot stand for long periods of time; note sent to PCP. The patient notes dialysis is going well. They keep her weight ~110kg. The patient reports her blood sugars are "ok" but did not provide any readings. She is requesting a CGM as it would be easier for her to check her sugars. The patient notes both she and [place] check her feet daily and deny any skin breakdown. She has not seen Podiatry recently; note sent to PCP. The patient states she has been battling with depression for years and does not feel her current dose of Celexa is helping. She states she has been on the current dose for years; note sent to PCP. The patient was appreciative of the help. She will call with any further questions or concerns. I will continue to follow.

## 2023-08-29 ENCOUNTER — HOSPITAL ENCOUNTER (OUTPATIENT)
Dept: RADIOLOGY | Facility: HOSPITAL | Age: 63
Discharge: HOME | End: 2023-08-29
Attending: INTERNAL MEDICINE | Admitting: RADIOLOGY
Payer: COMMERCIAL

## 2023-08-29 DIAGNOSIS — N18.9 CKD (CHRONIC KIDNEY DISEASE): ICD-10-CM

## 2023-08-29 LAB
DME PARACHUTE DELIVERY DATE REQUESTED: NORMAL
DME PARACHUTE ITEM DESCRIPTION: NORMAL
DME PARACHUTE ITEM DESCRIPTION: NORMAL
DME PARACHUTE ORDER STATUS: NORMAL
DME PARACHUTE SUPPLIER NAME: NORMAL
DME PARACHUTE SUPPLIER PHONE: NORMAL

## 2023-08-29 PROCEDURE — 36589 REMOVAL TUNNELED CV CATH: CPT

## 2023-08-29 PROCEDURE — 36589 REMOVAL TUNNELED CV CATH: CPT | Performed by: NURSE PRACTITIONER

## 2023-08-29 RX ORDER — OXYCODONE HYDROCHLORIDE 5 MG/1
7.5 TABLET ORAL EVERY 8 HOURS PRN
Qty: 120 TABLET | Refills: 0 | Status: SHIPPED | OUTPATIENT
Start: 2023-08-29

## 2023-08-29 RX ORDER — ATORVASTATIN CALCIUM 40 MG/1
40 TABLET, FILM COATED ORAL DAILY
Qty: 90 TABLET | Refills: 0 | Status: SHIPPED | OUTPATIENT
Start: 2023-08-29

## 2023-08-29 RX ADMIN — Medication 10 ML: at 11:16

## 2023-08-29 NOTE — BRIEF OP NOTE (RAD/CATH)
IR TUNNELED DIALYSIS CATHETER REMOVAL Procedure Note    PATIENT NAME: Lillian Escalante  : 1960  MRN: [id number]    Pre-op Diagnosis:   1. CKD (chronic kidney disease)      Post-op Diagnosis:   1.  CKD (chronic kidney disease)        Provider:   Fer Love  Assistants:     No qualified resident was available, Resident is only observing    Estimated Blood Loss: none     Findings: RIJ permacath removal with lido with epi    Specimens: none    Complications:  None immediate     Anesthesia: local    CHERELLE Khan     Date: 2023  Time: 11:30 AM

## 2023-08-29 NOTE — DISCHARGE INSTRUCTIONS
Perma-cath Removal   WHAT YOU NEED TO KNOW:   A perma-cath is a catheter placed through a vein into or near your right atrium. Your right atrium is the right upper chamber of your heart. A perma-cath is used for dialysis in an emergency or until a long-term device is ready to use. Or you no longer need dialysis. DISCHARGE INSTRUCTIONS:   Call 911 for any of the following: You feel lightheaded, short of breath, and have chest pain. You start bleeding    Contact Interventional Radiology at [phone] [place] PATIENTS: Contact Interventional Radiology at [phone]) Taylor Cardenas PATIENTS: Contact Interventional Radiology at [phone]) if:  Blood soaks through your bandage. You have new swelling in your arm, neck, face, or chest on your right side. Your bruises or pain get worse. You have a fever or chills. Persistent nausea or vomiting. Your incision is red, swollen, or draining pus. You have questions or concerns about your condition or care. Self-care:   Resume your normal diet. Small sips of flat soda will help with nausea. Keep your dressings dry. Do not get your perma-cath site wet until the incision closes. You may take a tub bath, but do not get your dressings wet. Water in your wound can cause bacteria to grow and cause an infection. If your dressing gets wet, remove the wet dressing and apply a dry bandaid. Keep it covered until the incision closes. This should only take a few days to heal. Do not use soaps or ointments. Immediately after your catheter is removed, no strenuous activity for twenty four hours and stay in an upright sitting position for two hours. Follow up with your healthcare provider as directed: Write down your questions so you remember to ask them during your visits.

## 2023-08-31 DIAGNOSIS — E11.42 TYPE 2 DIABETES MELLITUS WITH DIABETIC POLYNEUROPATHY, WITH LONG-TERM CURRENT USE OF INSULIN (HCC): Primary | ICD-10-CM

## 2023-08-31 DIAGNOSIS — Z79.4 TYPE 2 DIABETES MELLITUS WITH DIABETIC POLYNEUROPATHY, WITH LONG-TERM CURRENT USE OF INSULIN (HCC): Primary | ICD-10-CM

## 2023-08-31 DIAGNOSIS — I95.9 HYPOTENSION, UNSPECIFIED HYPOTENSION TYPE: Primary | ICD-10-CM

## 2023-08-31 RX ORDER — MIDODRINE HYDROCHLORIDE 5 MG/1
5 TABLET ORAL AS NEEDED
Qty: 30 TABLET | Refills: 4 | Status: SHIPPED | OUTPATIENT
Start: 2023-08-31

## 2023-09-05 LAB
DME PARACHUTE DELIVERY DATE ACTUAL: NORMAL
DME PARACHUTE DELIVERY DATE REQUESTED: NORMAL
DME PARACHUTE ITEM DESCRIPTION: NORMAL
DME PARACHUTE ITEM DESCRIPTION: NORMAL
DME PARACHUTE ORDER STATUS: NORMAL
DME PARACHUTE SUPPLIER NAME: NORMAL
DME PARACHUTE SUPPLIER PHONE: NORMAL

## 2023-09-06 LAB
DME PARACHUTE DELIVERY DATE ACTUAL: NORMAL
DME PARACHUTE DELIVERY DATE REQUESTED: NORMAL
DME PARACHUTE ITEM DESCRIPTION: NORMAL
DME PARACHUTE ORDER STATUS: NORMAL
DME PARACHUTE SUPPLIER NAME: NORMAL
DME PARACHUTE SUPPLIER PHONE: NORMAL

## 2023-09-08 ENCOUNTER — TELEPHONE (OUTPATIENT)
Dept: UROLOGY | Facility: CLINIC | Age: 63
End: 2023-09-08

## 2023-09-08 NOTE — TELEPHONE ENCOUNTER
Voicemail left for patient asking to call the office to reschedule her SPT exchange. Patient " no show" for today.

## 2023-09-11 ENCOUNTER — CLINICAL SUPPORT (OUTPATIENT)
Dept: FAMILY MEDICINE CLINIC | Facility: CLINIC | Age: 63
End: 2023-09-11

## 2023-09-11 VITALS — SYSTOLIC BLOOD PRESSURE: 90 MMHG | HEART RATE: 73 BPM | DIASTOLIC BLOOD PRESSURE: 51 MMHG

## 2023-09-11 DIAGNOSIS — E11.22 TYPE 2 DIABETES MELLITUS WITH CHRONIC KIDNEY DISEASE ON CHRONIC DIALYSIS, WITH LONG-TERM CURRENT USE OF INSULIN (HCC): Primary | ICD-10-CM

## 2023-09-11 DIAGNOSIS — Z99.2 TYPE 2 DIABETES MELLITUS WITH CHRONIC KIDNEY DISEASE ON CHRONIC DIALYSIS, WITH LONG-TERM CURRENT USE OF INSULIN (HCC): Primary | ICD-10-CM

## 2023-09-11 DIAGNOSIS — N18.6 TYPE 2 DIABETES MELLITUS WITH CHRONIC KIDNEY DISEASE ON CHRONIC DIALYSIS, WITH LONG-TERM CURRENT USE OF INSULIN (HCC): Primary | ICD-10-CM

## 2023-09-11 DIAGNOSIS — Z79.4 TYPE 2 DIABETES MELLITUS WITH FOOT ULCER, WITH LONG-TERM CURRENT USE OF INSULIN (HCC): ICD-10-CM

## 2023-09-11 DIAGNOSIS — E11.621 TYPE 2 DIABETES MELLITUS WITH FOOT ULCER, WITH LONG-TERM CURRENT USE OF INSULIN (HCC): ICD-10-CM

## 2023-09-11 DIAGNOSIS — Z79.4 TYPE 2 DIABETES MELLITUS WITH CHRONIC KIDNEY DISEASE ON CHRONIC DIALYSIS, WITH LONG-TERM CURRENT USE OF INSULIN (HCC): Primary | ICD-10-CM

## 2023-09-11 DIAGNOSIS — L97.509 TYPE 2 DIABETES MELLITUS WITH FOOT ULCER, WITH LONG-TERM CURRENT USE OF INSULIN (HCC): ICD-10-CM

## 2023-09-11 RX ORDER — INSULIN GLARGINE 100 [IU]/ML
INJECTION, SOLUTION SUBCUTANEOUS
Qty: 30 ML | Refills: 5 | Status: SHIPPED | OUTPATIENT
Start: 2023-09-11

## 2023-09-11 NOTE — PROGRESS NOTES
[address], 600 [facility]  [facility]  [phone]  Clinical Pharmacy Consultation    Encounter provider: Brenda Ortega, Pharmacist    This patient was referred by PCP for pharmacy consultation. Thank you for the referral. Sending this note to PCP. Assessment/Plan  1. Type 2 diabetes mellitus with chronic kidney disease on chronic dialysis, with long-term current use of insulin (HCC)      Type 2 diabetes:    • Blood glucose adequately controlled   • Most recent A1c below goal of <7%. SMBGs goals: Preprandial: <110mg/dL and HS: 110-200mg/dL  • Recent fasting BG range 30 in middle night and postprandial BG range 280  • Medications previously tried: metformin in past   • Current regimen:   o Trulicity 1.5 mg weekly  o Lantus - 50 units twice daily   o Humalog - 15-20 units three times daily before meals  • Will pend Rx for PCP signature/concurrence/Orders placed under CPA  o Pt desires to lose weight for her health. Recommend increasing GLP1 and decreasing basal insulin  • Pt was wearing dexcom in clinic today. Doing well with it. Likes device. Forgot  however, therefore couldn't do download   • Check blood sugars 3  times daily and record. • Counseled lifestyle improvement weight loss, follow a diabetic diet, and decrease high carbohydrates snacks. • Counseled on medication compliance and exercise as tolerated. • Showed pt how to customize dexcom alarms as hers has been going off when away from meter  o Reviewed pressure lows and how to avoid   • New DM regimen:  o INC: Trulicity 3.0 mg weekly  o DEC: Lantus - 35 units twice daily   o Humalog - 20 units three times daily before meals, sometimes 15    • FUTURE: If next A1c moves above goal, consider further inc trulicity   • RTC in in 6 weeks for assessment of glucose readings/medication review    Hyperlipidemia with clinical ASCVD event:   The ASCVD Risk score (Vahe RANDHAWA, et al., 2019) failed to calculate for the following reasons:  •   The valid total cholesterol range is 130 to 320 mg/dL   • Most recent lipid panel is within an acceptable range  • 2018 ACC/AHA blood cholesterol guidelines recommends high-intensity statin. Patient currently on moderate/high-intensity statin and tolerating well without side effects. • Continue lipitor as prescribed. HTN:   • BP goal: <130/80 mmHg   • In-office BP below goal, HR acceptable. - slightly low  o Pt has midodrine - prescribed prn  o May be able to d/c one of BP meds if remains low   • Current regimen: pt unsure, needs to bring in pill pack to confirm - bring to next visit   • Continue all as prescribed. FYI to PCP:   o Will pend Rx for PCP signature/concurrence  o Orders placed under CPA    Medication list- med rec will be completed next visit     The patient communicates understanding of the treatment plan. M*Modal Dragon software was used to dictate this note. It may contain errors with dictating incorrect words or incorrect spelling. Please contact the provider directly with any questions. Thank you for your understanding. Kory Munoz is a 63 y.o. female who presents in-person for initial clinical pharmacist consult. Reason for visit: diabetes. No particular questions or concerns.   HPI   30 years     Social History     Tobacco Use   Smoking Status Former   • Packs/day: 1.00   • Years: 35.00   • Total pack years: 35.00   • Types: Cigarettes   • Quit date:    • Years since quittin.7   Smokeless Tobacco Never        Allergies   Allergen Reactions   • Latex Itching   • Codeine GI Intolerance          Current Outpatient Medications:   •  allopurinol (ZYLOPRIM) 100 mg tablet, , Disp: , Rfl:   •  allopurinol (ZYLOPRIM) 100 mg tablet, TAKE 2 TABLETS (200 MG) BY MOUTH DAILY, Disp: 180 tablet, Rfl: 1  •  Aspirin Low Dose 81 MG EC tablet, TAKE 1 TABLET (81 MG TOTAL) BY MOUTH DAILY, Disp: 90 tablet, Rfl: 0  • atorvastatin (LIPITOR) 40 mg tablet, TAKE 1 TABLET (40 MG TOTAL) BY MOUTH DAILY, Disp: 90 tablet, Rfl: 0  •  Blood Glucose Monitoring Suppl (OneTouch Verio Reflect) w/Device KIT, Check blood sugars once daily. Please substitute with appropriate alternative as covered by patient's insurance. Dx: E11.65, Disp: 1 kit, Rfl: 0  •  calcitriol (ROCALTROL) 0.5 MCG capsule, Take 1 capsule (0.5 mcg total) by mouth 3 (three) times a week, Disp: 45 capsule, Rfl: 5  •  carvedilol (COREG) 25 mg tablet, Take 1 tablet (25 mg total) by mouth 2 (two) times a day with meals, Disp: 180 tablet, Rfl: 2  •  citalopram (CeleXA) 20 mg tablet, TAKE 1 TABLET (20 MG TOTAL) BY MOUTH DAILY, Disp: 90 tablet, Rfl: 0  •  clopidogrel (PLAVIX) 75 mg tablet, TAKE 1 TABLET (75 MG TOTAL) BY MOUTH DAILY, Disp: 90 tablet, Rfl: 0  •  Deep Sea Nasal Spray 0.65 % nasal spray, INSTILL 1 SPRAY INTO EACH NOSTRIL AS NEEDED FOR CONGESTION, Disp: 104 mL, Rfl: 3  •  diphenhydrAMINE (BENADRYL) 25 mg capsule, Take 25 mg by mouth every 4 (four) hours as needed, Disp: , Rfl:   •  ferrous sulfate 325 (65 Fe) mg tablet, Take 325 mg by mouth daily (Patient not taking: Reported on 6/19/2023), Disp: , Rfl:   •  furosemide (LASIX) 80 mg tablet, Take 1 tablet (80 mg total) by mouth daily, Disp: 90 tablet, Rfl: 3  •  glucose blood (OneTouch Verio) test strip, Check blood sugars once daily. Please substitute with appropriate alternative as covered by patient's insurance.  Dx: E11.65, Disp: 100 each, Rfl: 3  •  hydrALAZINE (APRESOLINE) 25 mg tablet, TAKE 1 TABLET (25 MG TOTAL) BY MOUTH 3 (THREE) TIMES A DAY HOLD SBP <100 (Patient not taking: Reported on 7/31/2023), Disp: 90 tablet, Rfl: 0  •  insulin lispro (HumaLOG) 100 units/mL injection pen, INJECT 20 UNITS UNDER THE SKIN 3 (THREE) TIMES A DAY WITH MEALS, Disp: 15 mL, Rfl: 2  •  isosorbide mononitrate (IMDUR) 30 mg 24 hr tablet, TAKE 1 TABLET (30 MG TOTAL) BY MOUTH EVERY EVENING, Disp: 30 tablet, Rfl: 0  •  ketoconazole (NIZORAL) 2 % cream, Apply to suprapubic catheter site twice daily. , Disp: , Rfl:   •  Lantus SoloStar 100 units/mL SOPN, INJECT 50 UNITS UNDER THE SKIN EVERY 12 (TWELVE) HOURS, Disp: 15 mL, Rfl: 3  •  levothyroxine 50 mcg tablet, TAKE 1 TABLET (50 MCG TOTAL) BY MOUTH DAILY, Disp: 90 tablet, Rfl: 0  •  lidocaine (LMX) 4 % cream, Apply topically as needed for mild pain (Patient not taking: Reported on 7/31/2023), Disp: 30 g, Rfl: 0  •  losartan (COZAAR) 25 mg tablet, Take 1 tablet (25 mg total) by mouth daily, Disp: 90 tablet, Rfl: 1  •  midodrine (PROAMATINE) 5 mg tablet, Take 1 tablet (5 mg total) by mouth as needed (if sbp<100 with dialysis), Disp: 30 tablet, Rfl: 4  •  naloxone (NARCAN) 4 mg/0.1 mL nasal spray, Administer 1 spray into a nostril. If breathing does not return to normal or if breathing difficulty resumes after 2-3 minutes, give another dose in the other nostril using a new spray. (Patient not taking: Reported on 5/23/2023), Disp: 1 each, Rfl: 1  •  nitroglycerin (NITROSTAT) 0.4 mg SL tablet, Place 1 tablet (0.4 mg total) under the tongue every 5 (five) minutes as needed for chest pain (Patient not taking: Reported on 7/31/2023), Disp: 25 tablet, Rfl: 1  •  nystatin (MYCOSTATIN) powder, Apply topically 2 (two) times a day, Disp: 30 g, Rfl: 1  •  OLANZapine (ZyPREXA) 5 mg tablet, TAKE 1 TABLET (5 MG TOTAL) BY MOUTH DAILY AT BEDTIME, Disp: 90 tablet, Rfl: 0  •  ondansetron (ZOFRAN) 4 mg tablet, Take 1 tablet (4 mg total) by mouth every 8 (eight) hours as needed for nausea or vomiting (Patient not taking: Reported on 6/19/2023), Disp: 12 tablet, Rfl: 0  •  OneTouch Delica Lancets 86L MISC, Check blood sugars once daily. Please substitute with appropriate alternative as covered by patient's insurance.  Dx: E11.65, Disp: 100 each, Rfl: 3  •  oxyCODONE (ROXICODONE) 5 immediate release tablet, Take 1.5 tablets (7.5 mg total) by mouth every 8 (eight) hours as needed for severe pain 30 days supply Max Daily Amount: 22.5 mg, Disp: 120 tablet, Rfl: 0  •  pantoprazole (PROTONIX) 40 mg tablet, Take 1 tablet (40 mg total) by mouth daily, Disp: 180 tablet, Rfl: 1  •  ranitidine (ZANTAC) 300 MG tablet, Take 300 mg by mouth daily, Disp: , Rfl:   •  sevelamer carbonate (RENVELA) 800 mg tablet, Take 2 tablets (1,600 mg total) by mouth 3 (three) times a day with meals, Disp: 540 tablet, Rfl: 4  •  silver sulfadiazine (SILVADENE,SSD) 1 % cream, Apply topically daily (Patient not taking: Reported on 3/31/2023), Disp: 50 g, Rfl: 0  •  sodium chloride 0.9 % SOLN 100 mL with insulin regular 100 units/mL SOLN 100 Units, Inject 20 Units under the skin 3 (three) times a day with meals (Patient not taking: Reported on 5/23/2023), Disp: , Rfl:   •  tiZANidine (ZANAFLEX) 4 mg tablet, TAKE 1 TABLET (4 MG TOTAL) BY MOUTH EVERY 8 (EIGHT) HOURS AS NEEDED FOR MUSCLE SPASMS, Disp: 60 tablet, Rfl: 1  •  Trulicity 1.5 HR/5.7OA injection, INJECT 0.5 ML (1.5 MG TOTAL) UNDER THE SKIN ONCE A WEEK, Disp: 6 mL, Rfl: 0    Current Facility-Administered Medications:   •  cyanocobalamin injection 1,000 mcg, 1,000 mcg, Intramuscular, Q30 Days, CHERELLE Nowak, 1,000 mcg at 01/05/23 1651    Objective  Vitals:    Wt Readings from Last 3 Encounters:   07/31/23 114 kg (252 lb)   07/19/23 113 kg (249 lb 12.8 oz)   07/05/23 112 kg (247 lb)     Temp Readings from Last 3 Encounters:   07/05/23 97.5 °F (36.4 °C) (Temporal)   07/03/23 97.9 °F (36.6 °C)   05/08/23 98.7 °F (37.1 °C) (Temporal)     BP Readings from Last 3 Encounters:   07/31/23 140/60   07/19/23 138/60   07/05/23 132/86     Pulse Readings from Last 3 Encounters:   07/31/23 60   07/19/23 76   07/05/23 80       Labs:   Lab Results   Component Value Date/Time    HGBA1C 6.6 (A) 07/05/2023 04:04 PM    HGBA1C 8.1 (A) 04/05/2023 04:35 PM    HGBA1C 6.7 (A) 01/05/2023 04:21 PM    HGBA1C 8.1 (H) 08/26/2022 04:53 AM    HGBA1C 7.1 (H) 08/03/2021 04:13 PM    HGBA1C 12.3 (H) 12/18/2020 02:53 PM       Lab Results   Component Value Date/Time    BUN 47 (H) 02/06/2023 09:50 AM    BUN 40 (H) 01/17/2023 10:31 AM    BUN 35 (H) 01/16/2023 10:20 AM    CREATININE 3.28 (H) 02/06/2023 09:50 AM    CREATININE 2.38 (H) 01/17/2023 10:31 AM    CREATININE 2.25 (H) 01/16/2023 10:20 AM    EGFR 14 02/06/2023 09:50 AM    EGFR 21 01/17/2023 10:31 AM    EGFR 23 01/16/2023 10:20 AM       Lab Results   Component Value Date/Time    CREATININEUR 40.7 06/29/2022 08:41 AM    CREATININEUR 105.0 08/03/2021 04:13 PM    CREATININEUR 101.0 08/03/2021 04:13 PM    LABMICR 1,480.0 (H) 06/29/2022 08:41 AM    LABMICR 5,510.0 (H) 08/03/2021 04:13 PM    MICROALBCRE 3,636 (H) 06/29/2022 08:41 AM    MICROALBCRE 5,248 (H) 08/03/2021 04:13 PM       Lab Results   Component Value Date/Time    CHOLESTEROL 120 06/24/2022 04:21 AM    CHOLESTEROL 152 08/03/2021 04:13 PM    TRIG 138 06/24/2022 04:21 AM    TRIG 180 (H) 08/03/2021 04:13 PM    HDL 42 (L) 06/24/2022 04:21 AM    HDL 30 (L) 08/03/2021 04:13 PM    LDLCALC 50 06/24/2022 04:21 AM    LDLCALC 86 08/03/2021 04:13 PM       Eye Exam:   Lab Results   Component Value Date/Time    RIGHTDIABRET None 04/09/2021 12:53 PM    LEFTDIABRET None 04/09/2021 12:53 PM     Thuy Ernandez    -------------------------------------------------------------------------------------------------------------------------------------------    Pharmacist Tracking Tool  Reason For Outreach: Embedded Pharmacist  Demographics:  Intervention Method:  In Person  Type of Intervention: New  Topics Addressed: Diabetes  Pharmacologic Interventions: Medication Initiation and Medication Discontinuation  Non-Pharmacologic Interventions: Adherence addressed, Chart update and Medication Monitoring  Time:  Direct Patient Care: 30 mins  Care Coordination: 10 mins  Recommendation Recipient: Patient/Caregiver  Outcome: Accepted     Aj Cardoza, PharmD, [facility]  Ambulatory Care Clinical Pharmacist

## 2023-09-11 NOTE — TELEPHONE ENCOUNTER
Called and left another VM for patient to please return call to re-schedule nurse visit in the [facility] for SPT change.

## 2023-09-11 NOTE — TELEPHONE ENCOUNTER
Saw pt today fot T2DM visit    She is actually already wearing dexcom and doing well! Completed med review to best of ability. She is adherant to therapy overall    Her DM is well controlled, she is at goal overall. She really doesn't "need" any therapy changes,    She however wishes to lose weight. She is tolerating trulicity well. If you are in agreement, could increase trulicity to 3.0 mg weekly, and decrease basal insulin to ~ 35 units twice daily to help encourage weight loss and decrease hypogylcemia risk     Pended for your approval if agree.  (Pt aware of this discussion, I can call her with further update if needed)    Thank you,     Deric Abad, PharmD, [facility]  Ambulatory Care Clinical Pharmacist

## 2023-09-12 ENCOUNTER — TELEPHONE (OUTPATIENT)
Dept: FAMILY MEDICINE CLINIC | Facility: CLINIC | Age: 63
End: 2023-09-12

## 2023-09-12 DIAGNOSIS — L03.115 CELLULITIS OF RIGHT LOWER EXTREMITY: Primary | ICD-10-CM

## 2023-09-12 DIAGNOSIS — S81.801A NON-HEALING WOUND OF LOWER EXTREMITY, RIGHT, INITIAL ENCOUNTER: Primary | ICD-10-CM

## 2023-09-12 RX ORDER — CEPHALEXIN 500 MG/1
500 CAPSULE ORAL EVERY 12 HOURS SCHEDULED
Qty: 14 CAPSULE | Refills: 0 | Status: SHIPPED | OUTPATIENT
Start: 2023-09-12 | End: 2023-09-19

## 2023-09-12 NOTE — TELEPHONE ENCOUNTER
Hi, my name is Ricki Dhillon. I am a nurse with [facility] calling about a mutual patient. Davion Mckeon YOB: 1960 . We are home based medical care not a home health agency. 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 And the nurse practitioner thought of referral to a wound clinic for this might be in order. So that's why I'm calling in hopes that maybe you can refer her to a wound clinic for that wound. My callback number is [phone] if you have any questions. Thank you.

## 2023-09-18 ENCOUNTER — PROCEDURE VISIT (OUTPATIENT)
Dept: UROLOGY | Facility: CLINIC | Age: 63
End: 2023-09-18
Payer: COMMERCIAL

## 2023-09-18 VITALS
HEART RATE: 70 BPM | WEIGHT: 248 LBS | RESPIRATION RATE: 20 BRPM | OXYGEN SATURATION: 97 % | HEIGHT: 67 IN | DIASTOLIC BLOOD PRESSURE: 60 MMHG | BODY MASS INDEX: 38.92 KG/M2 | SYSTOLIC BLOOD PRESSURE: 140 MMHG

## 2023-09-18 DIAGNOSIS — R33.9 URINARY RETENTION: Primary | ICD-10-CM

## 2023-09-18 PROCEDURE — 51705 CHANGE OF BLADDER TUBE: CPT

## 2023-09-18 NOTE — PROGRESS NOTES
9/18/2023    Ryann Short  1960  [id number]    Diagnosis  Chief Complaint    Urinary Retention         Patient presents for SPT change managed by Dr. Satinder Nuñez  Follow up in 6 weeks for SPT exchange    Procedure: Suprapubic Tube Change         Cystostomy tube change     Date/Time 9/18/2023 1:30 PM     Performed by  Steffany Van RN   Authorized by Gaurav Bateman MD     Universal Protocol   Consent: Verbal consent obtained. Consent given by: patient       Procedure Details   Patient tolerance: patient tolerated the procedure well with no immediate complications               Current catheter removed without difficulty after deflation of an intact balloon. Site prepped with Hibiclens, new 16F  suprapubic spt change via aseptic technique without incident, 10 ml balloon inflated with sterile water. irrigated easily for blood-tinged return, mild spasm noted. Patient tolerated well. Attached to drainage bag.    16 fr silicone placed  Vitals:    09/18/23 1403   BP: 140/60   Pulse: 70   Resp: 20   SpO2: 97%   Weight: 112 kg (248 lb)   Height: 5' 7" (1.702 m)         Steffany Van RN

## 2023-09-22 DIAGNOSIS — I95.9 HYPOTENSION, UNSPECIFIED HYPOTENSION TYPE: ICD-10-CM

## 2023-09-22 RX ORDER — MIDODRINE HYDROCHLORIDE 5 MG/1
5 TABLET ORAL AS NEEDED
Qty: 90 TABLET | Refills: 2 | Status: SHIPPED | OUTPATIENT
Start: 2023-09-22

## 2023-09-29 ENCOUNTER — PATIENT OUTREACH (OUTPATIENT)
Dept: FAMILY MEDICINE CLINIC | Facility: CLINIC | Age: 63
End: 2023-09-29

## 2023-09-29 DIAGNOSIS — G89.4 CHRONIC PAIN SYNDROME: ICD-10-CM

## 2023-09-29 DIAGNOSIS — F11.20 CONTINUOUS OPIOID DEPENDENCE (HCC): ICD-10-CM

## 2023-09-29 DIAGNOSIS — K59.00 CONSTIPATION, UNSPECIFIED CONSTIPATION TYPE: Primary | ICD-10-CM

## 2023-09-29 RX ORDER — AMOXICILLIN 250 MG
1 CAPSULE ORAL DAILY
Qty: 30 TABLET | Refills: 0 | Status: SHIPPED | OUTPATIENT
Start: 2023-09-29

## 2023-09-29 RX ORDER — OXYCODONE HYDROCHLORIDE 5 MG/1
7.5 TABLET ORAL EVERY 8 HOURS PRN
Qty: 120 TABLET | Refills: 0 | Status: SHIPPED | OUTPATIENT
Start: 2023-09-29

## 2023-09-29 NOTE — PROGRESS NOTES
I received a call from the patient requesting a med refill; submitted to PCP. The patient is reporting constipation that has not improved by using Miralax; note sent to PCP. The patient stated she had a very small BM yesterday which was difficult to pass. I encouraged the patient to increase her water intake and try to walk to stimulate her bowels. The patient has no other concerns.

## 2023-10-02 ENCOUNTER — RA CDI HCC (OUTPATIENT)
Dept: OTHER | Facility: HOSPITAL | Age: 63
End: 2023-10-02

## 2023-10-02 NOTE — PROGRESS NOTES
[address] [facility] coding opportunities    I13.0      Chart Reviewed number of suggestions sent to Provider: 1     Patients Insurance     [facility]icare [facility]

## 2023-10-05 ENCOUNTER — PATIENT OUTREACH (OUTPATIENT)
Dept: FAMILY MEDICINE CLINIC | Facility: CLINIC | Age: 63
End: 2023-10-05

## 2023-10-05 NOTE — PROGRESS NOTES
I called the patient but received voicemail. Message was left reminding her of her PCP appointment for tomorrow at 1pm.  I will continue further outreach. The patient returned my call. I informed her of the above.

## 2023-10-06 LAB
LEFT EYE DIABETIC RETINOPATHY: NORMAL
RIGHT EYE DIABETIC RETINOPATHY: NORMAL

## 2023-10-09 ENCOUNTER — TELEPHONE (OUTPATIENT)
Dept: FAMILY MEDICINE CLINIC | Facility: CLINIC | Age: 63
End: 2023-10-09

## 2023-10-09 NOTE — TELEPHONE ENCOUNTER
Hi, this is [employer]. I had an appointment I'm supposed to be at right now and I overslept and I missed it and I was calling. I apologize if there's any way I could reschedule. My number is [phone]. Thank you. mary Cavazos.      Left voicemail for patient to call back.

## 2023-10-11 ENCOUNTER — PATIENT OUTREACH (OUTPATIENT)
Dept: FAMILY MEDICINE CLINIC | Facility: CLINIC | Age: 63
End: 2023-10-11

## 2023-10-11 NOTE — PROGRESS NOTES
I received a call from the patient stating she believes she has a UTI. She reports symptoms of burning with urination, urgency and a foul odor; note sent to PCP. She notes her urine is also quite dark but believes it might be a side effect from another med. The patient cannot tell if there is blood in the urine because it is dark. She denies fevers. She notes she has been increasing her water but needs to be careful since she is on fluid restriction.

## 2023-10-12 ENCOUNTER — PATIENT OUTREACH (OUTPATIENT)
Dept: FAMILY MEDICINE CLINIC | Facility: CLINIC | Age: 63
End: 2023-10-12

## 2023-10-12 DIAGNOSIS — Z79.4 TYPE 2 DIABETES MELLITUS WITH FOOT ULCER, WITH LONG-TERM CURRENT USE OF INSULIN (HCC): ICD-10-CM

## 2023-10-12 DIAGNOSIS — L97.509 TYPE 2 DIABETES MELLITUS WITH FOOT ULCER, WITH LONG-TERM CURRENT USE OF INSULIN (HCC): ICD-10-CM

## 2023-10-12 DIAGNOSIS — E11.621 TYPE 2 DIABETES MELLITUS WITH FOOT ULCER, WITH LONG-TERM CURRENT USE OF INSULIN (HCC): ICD-10-CM

## 2023-10-12 RX ORDER — LANCETS 33 GAUGE
EACH MISCELLANEOUS
Qty: 100 EACH | Refills: 5 | Status: SHIPPED | OUTPATIENT
Start: 2023-10-12

## 2023-10-12 NOTE — PROGRESS NOTES
I received a call from the patient stating she needs to reschedule her appointment; note sent to clerical.  She states she will not have a ride today but prefers an appointment for tomorrow afternoon. I informed the patient she has RF remaining on her insulin and to call the pharmacy. The patient is also requesting another med refill; submitted to PCP.

## 2023-10-13 ENCOUNTER — OFFICE VISIT (OUTPATIENT)
Dept: FAMILY MEDICINE CLINIC | Facility: CLINIC | Age: 63
End: 2023-10-13

## 2023-10-13 VITALS
HEART RATE: 92 BPM | RESPIRATION RATE: 16 BRPM | BODY MASS INDEX: 38.3 KG/M2 | HEIGHT: 67 IN | TEMPERATURE: 98.6 F | DIASTOLIC BLOOD PRESSURE: 70 MMHG | OXYGEN SATURATION: 94 % | WEIGHT: 244 LBS | SYSTOLIC BLOOD PRESSURE: 124 MMHG

## 2023-10-13 DIAGNOSIS — Z79.4 TYPE 2 DIABETES MELLITUS WITH FOOT ULCER, WITH LONG-TERM CURRENT USE OF INSULIN (HCC): Primary | ICD-10-CM

## 2023-10-13 DIAGNOSIS — I25.10 CORONARY ARTERY DISEASE INVOLVING NATIVE HEART WITHOUT ANGINA PECTORIS, UNSPECIFIED VESSEL OR LESION TYPE: ICD-10-CM

## 2023-10-13 DIAGNOSIS — F51.05 INSOMNIA SECONDARY TO ANXIETY: ICD-10-CM

## 2023-10-13 DIAGNOSIS — N18.32 STAGE 3B CHRONIC KIDNEY DISEASE (HCC): ICD-10-CM

## 2023-10-13 DIAGNOSIS — Z79.4 TYPE 2 DIABETES MELLITUS WITH DIABETIC POLYNEUROPATHY, WITH LONG-TERM CURRENT USE OF INSULIN (HCC): ICD-10-CM

## 2023-10-13 DIAGNOSIS — F41.9 INSOMNIA SECONDARY TO ANXIETY: ICD-10-CM

## 2023-10-13 DIAGNOSIS — N30.01 ACUTE CYSTITIS WITH HEMATURIA: ICD-10-CM

## 2023-10-13 DIAGNOSIS — E11.621 TYPE 2 DIABETES MELLITUS WITH FOOT ULCER, WITH LONG-TERM CURRENT USE OF INSULIN (HCC): Primary | ICD-10-CM

## 2023-10-13 DIAGNOSIS — E11.42 TYPE 2 DIABETES MELLITUS WITH DIABETIC POLYNEUROPATHY, WITH LONG-TERM CURRENT USE OF INSULIN (HCC): ICD-10-CM

## 2023-10-13 DIAGNOSIS — Z79.4 CURRENT USE OF INSULIN (HCC): ICD-10-CM

## 2023-10-13 DIAGNOSIS — L97.509 TYPE 2 DIABETES MELLITUS WITH FOOT ULCER, WITH LONG-TERM CURRENT USE OF INSULIN (HCC): Primary | ICD-10-CM

## 2023-10-13 PROCEDURE — 87086 URINE CULTURE/COLONY COUNT: CPT | Performed by: NURSE PRACTITIONER

## 2023-10-13 PROCEDURE — 99214 OFFICE O/P EST MOD 30 MIN: CPT | Performed by: NURSE PRACTITIONER

## 2023-10-13 PROCEDURE — 87147 CULTURE TYPE IMMUNOLOGIC: CPT | Performed by: NURSE PRACTITIONER

## 2023-10-13 PROCEDURE — 87077 CULTURE AEROBIC IDENTIFY: CPT | Performed by: NURSE PRACTITIONER

## 2023-10-13 PROCEDURE — 87186 SC STD MICRODIL/AGAR DIL: CPT | Performed by: NURSE PRACTITIONER

## 2023-10-13 RX ORDER — CEPHALEXIN 500 MG/1
500 CAPSULE ORAL 2 TIMES DAILY
Qty: 14 CAPSULE | Refills: 0 | Status: SHIPPED | OUTPATIENT
Start: 2023-10-13 | End: 2023-10-20

## 2023-10-13 NOTE — PROGRESS NOTES
Name: Marian Lerner      : 1960      MRN: [id number]  Encounter Provider: Gin N Arlyn Malin  Encounter Date: 10/13/2023   Encounter department: [address] [facility],[address]     1. Type 2 diabetes mellitus with foot ulcer, with long-term current use of insulin (HCC)  -     CBC and differential; Future  -     Comprehensive metabolic panel; Future  -     Hemoglobin A1C; Future  -     Lipid panel; Future    2. Stage 3b chronic kidney disease (HCC)  -     CBC and differential; Future  -     Comprehensive metabolic panel; Future  -     Hemoglobin A1C; Future  -     Lipid panel; Future    3. Acute cystitis with hematuria  -     cephalexin (KEFLEX) 500 mg capsule; Take 1 capsule (500 mg total) by mouth 2 (two) times a day for 7 days  -     Urine culture         Urine dip + leukocytes, blood, protein --> will send for culture   ED parameters reviewed     Subjective     67 YO female here today for same day visit. Urinary Tract Infection   This is a new problem. The current episode started in the past 7 days. The problem occurs intermittently. The problem has been waxing and waning. The quality of the pain is described as aching. The pain is at a severity of 6/10. The pain is moderate. There has been no fever. She is Not sexually active. There is No history of pyelonephritis. Associated symptoms include flank pain. Pertinent negatives include no chills. She has tried nothing for the symptoms. Her past medical history is significant for catheterization and recurrent UTIs. Review of Systems   Constitutional:  Positive for fatigue. Negative for chills and fever. Respiratory:  Negative for cough, choking and shortness of breath. Cardiovascular:  Negative for chest pain, palpitations and leg swelling. Genitourinary:  Positive for flank pain. Musculoskeletal:  Positive for arthralgias, back pain, gait problem, joint swelling and myalgias.    Skin: Negative for wound. Neurological:  Negative for seizures, syncope and weakness. Past Medical History:   Diagnosis Date   • Abnormal liver function    • Anemia    • Anxiety    • Arthritis    • CHF (congestive heart failure) (HCC)    • Chronic kidney disease     stage 3   • Chronic narcotic dependence (HCC)    • Chronic pain disorder     lower back, hands , neck and knees   • Coronary artery disease    • Depression    • Diabetes mellitus ([address])    • Elevated troponin 02/11/2022   • GERD (gastroesophageal reflux disease)     no meds at present   • Heart murmur     murmur   • Hyperlipidemia    • Hypertension    • Neurogenic bladder    • Open toe wound 12/2020    right big toe open calus but is dry at present   • PONV (postoperative nausea and vomiting)    • Renal disorder    • Shortness of breath     exertion   • Skin ulcer of hand, limited to breakdown of skin ([address]) 02/25/2021   • Sleep apnea     doesn't use cpap   • Suprapubic catheter [facility])    • Urinary retention      Past Surgical History:   Procedure Laterality Date   • AMPUTATION Left     2nd toe   • ANGIOPLASTY  2017 5   • BREAST EXCISIONAL BIOPSY Left    • BREAST SURGERY     • CARDIAC CATHETERIZATION     • CARDIAC CATHETERIZATION N/A 07/01/2022    Procedure: Cardiac catheterization;  Surgeon: Carmen Velasquez MD;  Location: [facility]; Service: Cardiology   • CARDIAC CATHETERIZATION N/A 07/01/2022    Procedure: Cardiac pci;  Surgeon: Carmen Velasquez MD;  Location: [facility];   Service: Cardiology   • CARPAL TUNNEL RELEASE Bilateral    • CERVICAL FUSION     • HYSTERECTOMY  2008   • IR AV FISTULAGRAM/GRAFTOGRAM  04/26/2023   • IR SUPRAPUBIC CATHETER PLACEMENT  06/15/2021   • IR SUPRAPUBIC CATHETER PLACEMENT  02/14/2023   • IR TUNNELED CENTRAL LINE PLACEMENT  04/04/2023   • IR TUNNELED DIALYSIS CATHETER PLACEMENT  07/15/2022   • IR TUNNELED DIALYSIS CATHETER PLACEMENT  12/12/2022   • IR TUNNELED DIALYSIS CATHETER REMOVAL  8/29/2023   • KNEE SURGERY     • OOPHORECTOMY     • IA ARTERIOVENOUS ANASTOMOSIS OPEN DIRECT Left 2023    Procedure: CREATION FISTULA  ARTERIOVENOUS (AV);   Surgeon: Ivette Melgar DO;  Location: [facility];  Service: Vascular   • IA EXC B9 LESION MRGN XCP SK TG S/N/H/F/G 3.1-4.0CM N/A 2020    Procedure: EXCISION SEBACEOUS CYST X 5 SCALP;  Surgeon: Elier Mina MD;  Location:  MAIN OR;  Service: General   • IA REVJ OPN [facility] FSTL [facility]O [address] [facility] [facility] Left 7/3/2023    Procedure: REVISION AV FISTULA;  Surgeon: Ivette Melgar DO;  Location: [facility];  Service: Vascular   • TOE AMPUTATION Left    • TRIGGER FINGER RELEASE Right     4th Finger     Family History   Problem Relation Age of Onset   • Stroke Father    • Heart disease Father    • No Known Problems Mother    • No Known Problems Sister    • No Known Problems Daughter    • No Known Problems Maternal Grandmother    • No Known Problems Maternal Grandfather    • No Known Problems Paternal Grandmother    • No Known Problems Paternal Grandfather    • No Known Problems Maternal Aunt    • No Known Problems Maternal Aunt    • No Known Problems Maternal Aunt    • No Known Problems Maternal Aunt    • No Known Problems Maternal Aunt    • No Known Problems Maternal Aunt    • No Known Problems Paternal Aunt      Social History     Socioeconomic History   • Marital status:      Spouse name: None   • Number of children: None   • Years of education: None   • Highest education level: None   Occupational History   • None   Tobacco Use   • Smoking status: Former     Packs/day: 1.00     Years: 35.00     Total pack years: 35.00     Types: Cigarettes     Quit date:      Years since quittin.7   • Smokeless tobacco: Never   Vaping Use   • Vaping Use: Never used   Substance and Sexual Activity   • Alcohol use: Not Currently   • Drug use: Never   • Sexual activity: Not Currently   Other Topics Concern   • None   Social History Narrative   • None     Social Determinants of Health     Financial Resource Strain: Low Risk  (2/22/2022)    Overall Financial Resource Strain (CARDIA)    • Difficulty of Paying Living Expenses: Not hard at all   Food Insecurity: No Food Insecurity (7/11/2022)    Hunger Vital Sign    • Worried About Running Out of Food in the Last Year: Never true    • Ran Out of Food in the Last Year: Never true   Transportation Needs: No Transportation Needs (7/11/2022)    PRAPARE - Transportation    • Lack of Transportation (Medical): No    • Lack of Transportation (Non-Medical): No   Physical Activity: Not on file   Stress: Not on file   Social Connections: Not on file   Intimate Partner Violence: Not on file   Housing Stability: Low Risk  (7/11/2022)    Housing Stability Vital Sign    • Unable to Pay for Housing in the Last Year: No    • Number of Places Lived in the Last Year: 1    • Unstable Housing in the Last Year: No     Current Outpatient Medications on File Prior to Visit   Medication Sig   • allopurinol (ZYLOPRIM) 100 mg tablet    • allopurinol (ZYLOPRIM) 100 mg tablet TAKE 2 TABLETS (200 MG) BY MOUTH DAILY   • Aspirin Low Dose 81 MG EC tablet TAKE 1 TABLET (81 MG TOTAL) BY MOUTH DAILY   • atorvastatin (LIPITOR) 40 mg tablet TAKE 1 TABLET (40 MG TOTAL) BY MOUTH DAILY   • Blood Glucose Monitoring Suppl (OneTouch Verio Reflect) w/Device KIT Check blood sugars once daily. Please substitute with appropriate alternative as covered by patient's insurance.  Dx: E11.65   • calcitriol (ROCALTROL) 0.5 MCG capsule Take 1 capsule (0.5 mcg total) by mouth 3 (three) times a week   • carvedilol (COREG) 25 mg tablet Take 1 tablet (25 mg total) by mouth 2 (two) times a day with meals (Patient not taking: Reported on 9/18/2023)   • citalopram (CeleXA) 20 mg tablet TAKE 1 TABLET (20 MG TOTAL) BY MOUTH DAILY   • clopidogrel (PLAVIX) 75 mg tablet TAKE 1 TABLET (75 MG TOTAL) BY MOUTH DAILY   • Deep Sea Nasal Spray 0.65 % nasal spray INSTILL 1 SPRAY INTO EACH NOSTRIL AS NEEDED FOR CONGESTION   • diphenhydrAMINE (BENADRYL) 25 mg capsule Take 25 mg by mouth every 4 (four) hours as needed   • dulaglutide (Trulicity) 3 DI/5.8PK injection Inject 0.5 mL (3 mg total) under the skin every 7 days   • ferrous sulfate 325 (65 Fe) mg tablet Take 325 mg by mouth daily (Patient not taking: Reported on 6/19/2023)   • furosemide (LASIX) 80 mg tablet Take 1 tablet (80 mg total) by mouth daily   • glucose blood (OneTouch Verio) test strip Check blood sugars once daily. Please substitute with appropriate alternative as covered by patient's insurance. Dx: E11.65   • hydrALAZINE (APRESOLINE) 25 mg tablet TAKE 1 TABLET (25 MG TOTAL) BY MOUTH 3 (THREE) TIMES A DAY HOLD SBP <100 (Patient not taking: Reported on 7/31/2023)   • Insulin Glargine Solostar (Lantus SoloStar) 100 UNIT/ML SOPN Inject 35 units twice daily   • insulin lispro (HumaLOG) 100 units/mL injection pen INJECT 20 UNITS UNDER THE SKIN 3 (THREE) TIMES A DAY WITH MEALS   • isosorbide mononitrate (IMDUR) 30 mg 24 hr tablet TAKE 1 TABLET (30 MG TOTAL) BY MOUTH EVERY EVENING   • ketoconazole (NIZORAL) 2 % cream Apply to suprapubic catheter site twice daily. • levothyroxine 50 mcg tablet TAKE 1 TABLET (50 MCG TOTAL) BY MOUTH DAILY   • lidocaine (LMX) 4 % cream Apply topically as needed for mild pain (Patient not taking: Reported on 7/31/2023)   • losartan (COZAAR) 25 mg tablet Take 1 tablet (25 mg total) by mouth daily   • midodrine (PROAMATINE) 5 mg tablet TAKE 1 TABLET (5 MG TOTAL) BY MOUTH AS NEEDED (IF SBP<100 WITH DIALYSIS)   • naloxone (NARCAN) 4 mg/0.1 mL nasal spray Administer 1 spray into a nostril. If breathing does not return to normal or if breathing difficulty resumes after 2-3 minutes, give another dose in the other nostril using a new spray.  (Patient not taking: Reported on 5/23/2023)   • nitroglycerin (NITROSTAT) 0.4 mg SL tablet Place 1 tablet (0.4 mg total) under the tongue every 5 (five) minutes as needed for chest pain (Patient not taking: Reported on 7/31/2023)   • nystatin (MYCOSTATIN) powder Apply topically 2 (two) times a day   • OLANZapine (ZyPREXA) 5 mg tablet TAKE 1 TABLET (5 MG TOTAL) BY MOUTH DAILY AT BEDTIME   • ondansetron (ZOFRAN) 4 mg tablet Take 1 tablet (4 mg total) by mouth every 8 (eight) hours as needed for nausea or vomiting (Patient not taking: Reported on 6/19/2023)   • OneTouch Delica Lancets 53V MISC Check blood sugars once daily. Please substitute with appropriate alternative as covered by patient's insurance. Dx: E11.65   • oxyCODONE (ROXICODONE) 5 immediate release tablet Take 1.5 tablets (7.5 mg total) by mouth every 8 (eight) hours as needed for severe pain 30 days supply Max Daily Amount: 22.5 mg   • pantoprazole (PROTONIX) 40 mg tablet Take 1 tablet (40 mg total) by mouth daily   • ranitidine (ZANTAC) 300 MG tablet Take 300 mg by mouth daily (Patient not taking: Reported on 9/18/2023)   • senna-docusate sodium (SENOKOT S) 8.6-50 mg per tablet Take 1 tablet by mouth daily   • sevelamer carbonate (RENVELA) 800 mg tablet Take 2 tablets (1,600 mg total) by mouth 3 (three) times a day with meals   • silver sulfadiazine (SILVADENE,SSD) 1 % cream Apply topically daily (Patient not taking: Reported on 3/31/2023)   • sodium chloride 0.9 % SOLN 100 mL with insulin regular 100 units/mL SOLN 100 Units Inject 20 Units under the skin 3 (three) times a day with meals (Patient not taking: Reported on 5/23/2023)   • tiZANidine (ZANAFLEX) 4 mg tablet TAKE 1 TABLET (4 MG TOTAL) BY MOUTH EVERY 8 (EIGHT) HOURS AS NEEDED FOR MUSCLE SPASMS   • [DISCONTINUED] oxygen gas Inhale 2 L/min continuous.  Indications: Respiratory Failure   • [DISCONTINUED] Torsemide 40 MG TABS Take 40 mg by mouth daily     Allergies   Allergen Reactions   • Latex Itching   • Codeine GI Intolerance     Immunization History   Administered Date(s) Administered   • COVID-19 MODERNA VACC 0.5 ML IM 04/07/2021, 05/10/2021   • Travisfort Vac BIVALENT Jose J-sucrose 12 Yr+ IM 11/29/2022, 01/05/2023   • INFLUENZA 10/01/2017, 11/11/2019, 11/10/2020, 10/13/2021   • Influenza Quadrivalent Preservative Free 3 years and older IM 02/15/2013, 11/11/2019, 10/13/2021   • Influenza Split High Dose Preservative Free IM 10/01/2016   • Influenza, recombinant, quadrivalent,injectable, preservative free 11/10/2020, 10/13/2021   • Influenza, seasonal, injectable, preservative free 02/15/2013, 10/13/2021   • Pneumococcal Conjugate 13-Valent 12/16/2021   • Pneumococcal Polysaccharide PPV23 01/01/2012, 02/15/2013, 12/16/2021   • Tuberculin Skin Test-PPD Intradermal 07/20/2022   • Unknown 08/17/2022       Objective     /70 (BP Location: Right arm, Patient Position: Sitting, Cuff Size: Standard)   Pulse 92   Temp 98.6 °F (37 °C) (Temporal)   Resp 16   Ht 5' 7" (1.702 m)   Wt 111 kg (244 lb)   SpO2 94%   BMI 38.22 kg/m²     Physical Exam  Vitals and nursing note reviewed. Constitutional:       Appearance: She is ill-appearing. HENT:      Right Ear: External ear normal.      Left Ear: External ear normal.   Eyes:      General:         Right eye: No discharge. Left eye: No discharge. Cardiovascular:      Rate and Rhythm: Normal rate and regular rhythm. Pulmonary:      Effort: Pulmonary effort is normal. No respiratory distress. Comments: RCW catheter   Abdominal:      General: Bowel sounds are normal. There is no distension. Tenderness: There is no abdominal tenderness. Genitourinary:     Comments: +suprapubic catheter  Musculoskeletal:      Right lower leg: Edema present. Left lower leg: Edema present. Neurological:      Mental Status: She is alert and oriented to person, place, and time.    Psychiatric:         Behavior: Behavior normal.       [address]

## 2023-10-15 LAB
BACTERIA UR CULT: ABNORMAL
BACTERIA UR CULT: ABNORMAL

## 2023-10-16 LAB
BACTERIA UR CULT: ABNORMAL
BACTERIA UR CULT: ABNORMAL

## 2023-10-17 RX ORDER — CLOPIDOGREL BISULFATE 75 MG/1
75 TABLET ORAL DAILY
Qty: 90 TABLET | Refills: 0 | Status: SHIPPED | OUTPATIENT
Start: 2023-10-17

## 2023-10-17 RX ORDER — OLANZAPINE 5 MG/1
5 TABLET ORAL
Qty: 90 TABLET | Refills: 0 | Status: SHIPPED | OUTPATIENT
Start: 2023-10-17

## 2023-10-18 DIAGNOSIS — N30.90 CYSTITIS: Primary | ICD-10-CM

## 2023-10-18 RX ORDER — CIPROFLOXACIN 500 MG/1
500 TABLET, FILM COATED ORAL EVERY 24 HOURS
Qty: 5 TABLET | Refills: 0 | Status: SHIPPED | OUTPATIENT
Start: 2023-10-18 | End: 2023-10-23 | Stop reason: ALTCHOICE

## 2023-10-22 NOTE — ASSESSMENT & PLAN NOTE
- patient was following with Orthopedic surgery while in [place] for her osteoarthritis of left knee  - patient is status post knee replacement in on the right side in 2017   - patient signed for record release to be sent to us  - referral for [facility] orthopedics given today  Negative

## 2023-10-23 ENCOUNTER — PATIENT OUTREACH (OUTPATIENT)
Dept: FAMILY MEDICINE CLINIC | Facility: CLINIC | Age: 63
End: 2023-10-23

## 2023-10-23 ENCOUNTER — CLINICAL SUPPORT (OUTPATIENT)
Dept: FAMILY MEDICINE CLINIC | Facility: CLINIC | Age: 63
End: 2023-10-23

## 2023-10-23 DIAGNOSIS — N18.6 TYPE 2 DIABETES MELLITUS WITH CHRONIC KIDNEY DISEASE ON CHRONIC DIALYSIS, WITH LONG-TERM CURRENT USE OF INSULIN (HCC): ICD-10-CM

## 2023-10-23 DIAGNOSIS — Z79.4 TYPE 2 DIABETES MELLITUS WITH CHRONIC KIDNEY DISEASE ON CHRONIC DIALYSIS, WITH LONG-TERM CURRENT USE OF INSULIN (HCC): ICD-10-CM

## 2023-10-23 DIAGNOSIS — Z99.2 TYPE 2 DIABETES MELLITUS WITH CHRONIC KIDNEY DISEASE ON CHRONIC DIALYSIS, WITH LONG-TERM CURRENT USE OF INSULIN (HCC): ICD-10-CM

## 2023-10-23 DIAGNOSIS — F33.2 SEVERE EPISODE OF RECURRENT MAJOR DEPRESSIVE DISORDER, WITHOUT PSYCHOTIC FEATURES (HCC): Primary | ICD-10-CM

## 2023-10-23 DIAGNOSIS — Z79.4 TYPE 2 DIABETES MELLITUS WITH CHRONIC KIDNEY DISEASE ON CHRONIC DIALYSIS, WITH LONG-TERM CURRENT USE OF INSULIN (HCC): Primary | ICD-10-CM

## 2023-10-23 DIAGNOSIS — E11.22 TYPE 2 DIABETES MELLITUS WITH CHRONIC KIDNEY DISEASE ON CHRONIC DIALYSIS, WITH LONG-TERM CURRENT USE OF INSULIN (HCC): Primary | ICD-10-CM

## 2023-10-23 DIAGNOSIS — E11.22 TYPE 2 DIABETES MELLITUS WITH CHRONIC KIDNEY DISEASE ON CHRONIC DIALYSIS, WITH LONG-TERM CURRENT USE OF INSULIN (HCC): ICD-10-CM

## 2023-10-23 DIAGNOSIS — Z99.2 TYPE 2 DIABETES MELLITUS WITH CHRONIC KIDNEY DISEASE ON CHRONIC DIALYSIS, WITH LONG-TERM CURRENT USE OF INSULIN (HCC): Primary | ICD-10-CM

## 2023-10-23 DIAGNOSIS — N18.6 TYPE 2 DIABETES MELLITUS WITH CHRONIC KIDNEY DISEASE ON CHRONIC DIALYSIS, WITH LONG-TERM CURRENT USE OF INSULIN (HCC): Primary | ICD-10-CM

## 2023-10-23 RX ORDER — CITALOPRAM 40 MG/1
TABLET ORAL
Qty: 90 TABLET | Refills: 1 | Status: SHIPPED | OUTPATIENT
Start: 2023-10-23

## 2023-10-23 RX ORDER — INSULIN DEGLUDEC 200 U/ML
INJECTION, SOLUTION SUBCUTANEOUS
Qty: 27 ML | Refills: 3 | Status: SHIPPED | OUTPATIENT
Start: 2023-10-23

## 2023-10-23 NOTE — PROGRESS NOTES
[address] [facility]  [address] [place] [address], [address]  [phone]  Clinical Pharmacy Consultation    Encounter provider: Toby Brooke, Pharmacist    This patient was referred by PCP for pharmacy consultation. Thank you for the referral. Sending this note to PCP. Assessment/Plan  1. Type 2 diabetes mellitus with chronic kidney disease on chronic dialysis, with long-term current use of insulin (HCC)        Type 2 diabetes:    Current regimen:   Trulicity 3 mg weekly  Lantus 35 units twice daily  Humalog 20 units three times daily before meals   Pt requests tresiba instead of lantus so she can give only once daily. This is fine. [employer] says it should be covered for her. Visit focused on   trulicity tolerance, explained typically 6 weeks till reaches new baseline  Dexcom obtain. Spoke with daughter. Eventually determined pt uses [facility], provided them [facility] number for refills   Blood glucose marginally controlled   Most recent A1c below goal of <7%. Currently asymptomatic and adherant overall with treatment plan  Admits to missing humalog sometimes   See dexcom download below  Medications previously tried: see last note  Sending this encounter note for PCP review/concurrence. Check blood sugars  2 times daily and record. Counseled lifestyle improvement, to follow a diabetic diet, and decrease high carbohydrates snacks. Counseled on medication compliance and exercise as tolerated. New DM regimen:   Trulicity 3 mg weekly  STOP: Lantus   Tresiba 60 units daily   Humalog 20 units three times daily before meals   FUTURE: is trulicity being tolerated? RTC in  8 weeks  for assessment of glucose readings/medication review                    Hyperlipidemia with clinical ASCVD event:  Secondary prevention  Most recent lipid panel is within an acceptable range  2018 ACC/AHA blood cholesterol guidelines recommends moderate-intensity statin.  Patient currently on moderate-intensity statin and tolerating well without side effects. Continue atorv as prescribed. HTN:   BP goal: <130/80 mmHg   In-office BP below goal, HR acceptable. Current regimen: see med list, updated today  Continue regimen as prescribed. Mood problems lately. Pt requests to switch off of citalopram. However, has helped pt in past. Reviewed mechanism of action, adverse effects, benefits, risks, administration, frequency, and likely duration of therapy of citalopram. Consider dose increase. Discuss with pcp      BMI Counseling: There is no height or weight on file to calculate BMI. The BMI is above normal. Nutrition recommendations include reducing portion sizes and decreasing overall calorie intake. Exercise recommendations include exercising 3-5 times per week and strength training exercises. Pharmacotherapy was ordered for patient to aid in weight loss. BMI Categories:  Underweight = <18.5  Normal weight = 18.5-24.9  Overweight = 25-29.9  Obesity = BMI of 30 or greater     FYI to PCP:   Will pend Rx for PCP signature/concurrence  Orders placed under CPA    Medication list was updated and discussed with patient. Discontinued medications: multiple, please see updated med list   Clinically significant drug interactions identified: none   Side effects from medication(s) reported: none    The patient communicates understanding of the treatment plan. M*Modal Dragon software was used to dictate this note. It may contain errors with dictating incorrect words or incorrect spelling. Please contact the provider directly with any questions. Thank you for your understanding. Kory Mcgill is a 66 y.o. female who presents in-person for initial clinical pharmacist consult. Reason for visit: diabetes. No particular questions or concerns.   HPI   No major changes, pt obtained dexcom and is doing well with it     Social History     Tobacco Use   Smoking Status Former    Packs/day: 1.00    Years: 35.00    Total pack years: 35.00    Types: Cigarettes    Quit date:     Years since quittin.8   Smokeless Tobacco Never        Allergies   Allergen Reactions    Latex Itching    Codeine GI Intolerance          Current Outpatient Medications:     allopurinol (ZYLOPRIM) 100 mg tablet, TAKE 2 TABLETS (200 MG) BY MOUTH DAILY, Disp: 180 tablet, Rfl: 1    Aspirin Low Dose 81 MG EC tablet, TAKE 1 TABLET (81 MG TOTAL) BY MOUTH DAILY, Disp: 90 tablet, Rfl: 0    atorvastatin (LIPITOR) 40 mg tablet, TAKE 1 TABLET (40 MG TOTAL) BY MOUTH DAILY, Disp: 90 tablet, Rfl: 0    Blood Glucose Monitoring Suppl (OneTouch Verio Reflect) w/Device KIT, Check blood sugars once daily. Please substitute with appropriate alternative as covered by patient's insurance. Dx: E11.65, Disp: 1 kit, Rfl: 0    calcitriol (ROCALTROL) 0.5 MCG capsule, Take 1 capsule (0.5 mcg total) by mouth 3 (three) times a week, Disp: 45 capsule, Rfl: 5    carvedilol (COREG) 25 mg tablet, Take 1 tablet (25 mg total) by mouth 2 (two) times a day with meals (Patient not taking: Reported on 2023), Disp: 180 tablet, Rfl: 2    citalopram (CeleXA) 20 mg tablet, TAKE 1 TABLET (20 MG TOTAL) BY MOUTH DAILY, Disp: 90 tablet, Rfl: 0    clopidogrel (PLAVIX) 75 mg tablet, TAKE 1 TABLET (75 MG TOTAL) BY MOUTH DAILY, Disp: 90 tablet, Rfl: 0    Deep Sea Nasal Spray 0.65 % nasal spray, INSTILL 1 SPRAY INTO EACH NOSTRIL AS NEEDED FOR CONGESTION, Disp: 104 mL, Rfl: 3    diphenhydrAMINE (BENADRYL) 25 mg capsule, Take 25 mg by mouth every 4 (four) hours as needed, Disp: , Rfl:     dulaglutide (Trulicity) 3 WF/1.0AX injection, Inject 0.5 mL (3 mg total) under the skin every 7 days, Disp: 6 mL, Rfl: 1    furosemide (LASIX) 80 mg tablet, Take 1 tablet (80 mg total) by mouth daily, Disp: 90 tablet, Rfl: 3    glucose blood (OneTouch Verio) test strip, Check blood sugars once daily. Please substitute with appropriate alternative as covered by patient's insurance.  Dx: E11.65, Disp: 100 each, Rfl: 3    Insulin Glargine Solostar (Lantus SoloStar) 100 UNIT/ML SOPN, Inject 35 units twice daily, Disp: 30 mL, Rfl: 5    insulin lispro (HumaLOG) 100 units/mL injection pen, INJECT 20 UNITS UNDER THE SKIN 3 (THREE) TIMES A DAY WITH MEALS, Disp: 15 mL, Rfl: 2    isosorbide mononitrate (IMDUR) 30 mg 24 hr tablet, TAKE 1 TABLET (30 MG TOTAL) BY MOUTH EVERY EVENING, Disp: 30 tablet, Rfl: 0    ketoconazole (NIZORAL) 2 % cream, Apply to suprapubic catheter site twice daily. , Disp: , Rfl:     levothyroxine 50 mcg tablet, TAKE 1 TABLET (50 MCG TOTAL) BY MOUTH DAILY, Disp: 90 tablet, Rfl: 0    losartan (COZAAR) 25 mg tablet, Take 1 tablet (25 mg total) by mouth daily, Disp: 90 tablet, Rfl: 1    midodrine (PROAMATINE) 5 mg tablet, TAKE 1 TABLET (5 MG TOTAL) BY MOUTH AS NEEDED (IF SBP<100 WITH DIALYSIS), Disp: 90 tablet, Rfl: 2    naloxone (NARCAN) 4 mg/0.1 mL nasal spray, Administer 1 spray into a nostril. If breathing does not return to normal or if breathing difficulty resumes after 2-3 minutes, give another dose in the other nostril using a new spray. (Patient not taking: Reported on 5/23/2023), Disp: 1 each, Rfl: 1    nitroglycerin (NITROSTAT) 0.4 mg SL tablet, Place 1 tablet (0.4 mg total) under the tongue every 5 (five) minutes as needed for chest pain (Patient not taking: Reported on 7/31/2023), Disp: 25 tablet, Rfl: 1    OLANZapine (ZyPREXA) 5 mg tablet, TAKE 1 TABLET (5 MG TOTAL) BY MOUTH DAILY AT BEDTIME, Disp: 90 tablet, Rfl: 0    ondansetron (ZOFRAN) 4 mg tablet, Take 1 tablet (4 mg total) by mouth every 8 (eight) hours as needed for nausea or vomiting (Patient not taking: Reported on 6/19/2023), Disp: 12 tablet, Rfl: 0    OneTouch Delica Lancets 16H MISC, Check blood sugars once daily. Please substitute with appropriate alternative as covered by patient's insurance.  Dx: E11.65, Disp: 100 each, Rfl: 5    oxyCODONE (ROXICODONE) 5 immediate release tablet, Take 1.5 tablets (7.5 mg total) by mouth every 8 (eight) hours as needed for severe pain 30 days supply Max Daily Amount: 22.5 mg, Disp: 120 tablet, Rfl: 0    pantoprazole (PROTONIX) 40 mg tablet, Take 1 tablet (40 mg total) by mouth daily, Disp: 180 tablet, Rfl: 1    senna-docusate sodium (SENOKOT S) 8.6-50 mg per tablet, Take 1 tablet by mouth daily, Disp: 30 tablet, Rfl: 0    sevelamer carbonate (RENVELA) 800 mg tablet, Take 2 tablets (1,600 mg total) by mouth 3 (three) times a day with meals, Disp: 540 tablet, Rfl: 4    silver sulfadiazine (SILVADENE,SSD) 1 % cream, Apply topically daily (Patient not taking: Reported on 3/31/2023), Disp: 50 g, Rfl: 0    tiZANidine (ZANAFLEX) 4 mg tablet, TAKE 1 TABLET (4 MG TOTAL) BY MOUTH EVERY 8 (EIGHT) HOURS AS NEEDED FOR MUSCLE SPASMS, Disp: 60 tablet, Rfl: 1    Current Facility-Administered Medications:     cyanocobalamin injection 1,000 mcg, 1,000 mcg, Intramuscular, Q30 Days, Vicki Lisa, CRNP, 1,000 mcg at 01/05/23 1651    Objective  Vitals:    Wt Readings from Last 3 Encounters:   10/13/23 111 kg (244 lb)   09/18/23 112 kg (248 lb)   07/31/23 114 kg (252 lb)     Temp Readings from Last 3 Encounters:   10/13/23 98.6 °F (37 °C) (Temporal)   07/05/23 97.5 °F (36.4 °C) (Temporal)   07/03/23 97.9 °F (36.6 °C)     BP Readings from Last 3 Encounters:   10/13/23 124/70   09/18/23 140/60   09/11/23 90/51     Pulse Readings from Last 3 Encounters:   10/13/23 92   09/18/23 70   09/11/23 73       Labs:   Lab Results   Component Value Date/Time    HGBA1C 6.6 (A) 07/05/2023 04:04 PM    HGBA1C 8.1 (A) 04/05/2023 04:35 PM    HGBA1C 6.7 (A) 01/05/2023 04:21 PM    HGBA1C 8.1 (H) 08/26/2022 04:53 AM    HGBA1C 7.1 (H) 08/03/2021 04:13 PM    HGBA1C 12.3 (H) 12/18/2020 02:53 PM       Lab Results   Component Value Date/Time    BUN 47 (H) 02/06/2023 09:50 AM    BUN 40 (H) 01/17/2023 10:31 AM    BUN 35 (H) 01/16/2023 10:20 AM    CREATININE 3.28 (H) 02/06/2023 09:50 AM    CREATININE 2.38 (H) 01/17/2023 10:31 AM CREATININE 2.25 (H) 01/16/2023 10:20 AM    EGFR 14 02/06/2023 09:50 AM    EGFR 21 01/17/2023 10:31 AM    EGFR 23 01/16/2023 10:20 AM       Lab Results   Component Value Date/Time    CREATININEUR 40.7 06/29/2022 08:41 AM    CREATININEUR 105.0 08/03/2021 04:13 PM    CREATININEUR 101.0 08/03/2021 04:13 PM    LABMICR 1,480.0 (H) 06/29/2022 08:41 AM    LABMICR 5,510.0 (H) 08/03/2021 04:13 PM    MICROALBCRE 3,636 (H) 06/29/2022 08:41 AM    MICROALBCRE 5,248 (H) 08/03/2021 04:13 PM       Lab Results   Component Value Date/Time    CHOLESTEROL 120 06/24/2022 04:21 AM    CHOLESTEROL 152 08/03/2021 04:13 PM    TRIG 138 06/24/2022 04:21 AM    TRIG 180 (H) 08/03/2021 04:13 PM    HDL 42 (L) 06/24/2022 04:21 AM    HDL 30 (L) 08/03/2021 04:13 PM    LDLCALC 50 06/24/2022 04:21 AM    LDLCALC 86 08/03/2021 04:13 PM       Eye Exam:   Lab Results   Component Value Date/Time    RIGHTDIABRET None 10/06/2023 10:38 AM    LEFTDIABRET None 10/06/2023 10:38 AM     Thuy Neff    -----------------------------------------------------------------------------------------------    Pharmacist Tracking Tool  Reason For Outreach: Embedded Pharmacist  Demographics:  Intervention Method:  In Person  Type of Intervention: Follow-Up  Topics Addressed: Diabetes  Pharmacologic Interventions: Medication Initiation and Dose or Frequency Adjusted  Non-Pharmacologic Interventions: Personal CGM  Time:  Direct Patient Care:  50  mins  Care Coordination:  20  mins  Recommendation Recipient: Patient/Caregiver and Provider  Outcome: Accepted

## 2023-10-23 NOTE — TELEPHONE ENCOUNTER
Pt seen today for T2DM f/u    She requests to change lantus to tresiba to change twice daily dosing to once daily. This is reasonable. (According to google, it is on her formulary) - subtract 10 units TDD off to decrease hypoglycemia risk - Pended     She also states she lost a family member yesterday, struggeling with depression exacerbation.  Requests to switch citalopram    However this med has helped her in the past. Recommend instead possibility increasing citalopram? Will pend to PCP for thoughts    Maximo Saez, PharmD, [address] [facility] Pharmacist

## 2023-10-23 NOTE — PROGRESS NOTES
I called the patient to remind her of her appointment with the pharmacist this afternoon. She states she will be attending. The patient noted a family member recently passed away which was very unexpected. I provided condolences. I will continue to follow.

## 2023-10-24 DIAGNOSIS — L03.116 CELLULITIS OF LEFT LOWER EXTREMITY: Primary | ICD-10-CM

## 2023-10-24 RX ORDER — CEPHALEXIN 500 MG/1
500 CAPSULE ORAL EVERY 12 HOURS SCHEDULED
Qty: 14 CAPSULE | Refills: 0 | Status: SHIPPED | OUTPATIENT
Start: 2023-10-24 | End: 2023-10-31

## 2023-10-25 ENCOUNTER — VBI (OUTPATIENT)
Dept: ADMINISTRATIVE | Facility: OTHER | Age: 63
End: 2023-10-25

## 2023-10-27 ENCOUNTER — PROCEDURE VISIT (OUTPATIENT)
Dept: UROLOGY | Facility: CLINIC | Age: 63
End: 2023-10-27
Payer: COMMERCIAL

## 2023-10-27 VITALS
HEIGHT: 67 IN | HEART RATE: 82 BPM | WEIGHT: 242 LBS | DIASTOLIC BLOOD PRESSURE: 70 MMHG | OXYGEN SATURATION: 96 % | RESPIRATION RATE: 20 BRPM | BODY MASS INDEX: 37.98 KG/M2 | SYSTOLIC BLOOD PRESSURE: 140 MMHG

## 2023-10-27 DIAGNOSIS — N31.9 NEUROGENIC BLADDER: Primary | ICD-10-CM

## 2023-10-27 PROCEDURE — 51705 CHANGE OF BLADDER TUBE: CPT

## 2023-10-27 NOTE — PROGRESS NOTES
10/27/2023    Neto Ma  1960  [id number]    Diagnosis  Chief Complaint    Neurogenic Bladder         Patient presents for SPT morales managed by Dr. Javier Gutierres  Follow up in 6 weeks for SPT change    Procedure: Suprapubic Tube Change       Cystostomy tube change     Date/Time  10/27/2023 11:00 AM     Performed by  Josie El RN   Authorized by  Keiko Benson MD     Universal Protocol   Consent: Verbal consent obtained. Consent given by: patient     Procedure Details   Patient tolerance: patient tolerated the procedure well with no immediate complications               Current catheter removed without difficulty after deflation of an intact balloon. Site prepped with Hibiclens, new 16F  suprapubic spt change via aseptic technique without incident, 10 ml balloon inflated with sterile water. irrigated easily for straw-yellow return, mild spasm noted. Patient tolerated well. Attached to drainage bag.    16 fr silicone placed  Vitals:    10/27/23 1128   BP: 140/70   Pulse: 82   Resp: 20   SpO2: 96%   Weight: 110 kg (242 lb)   Height: 5' 7" (1.702 m)         Josie El RN

## 2023-10-31 ENCOUNTER — PATIENT OUTREACH (OUTPATIENT)
Dept: FAMILY MEDICINE CLINIC | Facility: CLINIC | Age: 63
End: 2023-10-31

## 2023-10-31 DIAGNOSIS — F11.20 CONTINUOUS OPIOID DEPENDENCE (HCC): ICD-10-CM

## 2023-10-31 DIAGNOSIS — G89.4 CHRONIC PAIN SYNDROME: ICD-10-CM

## 2023-10-31 NOTE — PROGRESS NOTES
I received a call from the patient requesting a med refill; submitted to PCP. I will continue to follow.

## 2023-11-01 RX ORDER — OXYCODONE HYDROCHLORIDE 5 MG/1
7.5 TABLET ORAL EVERY 8 HOURS PRN
Qty: 120 TABLET | Refills: 0 | Status: SHIPPED | OUTPATIENT
Start: 2023-11-01

## 2023-11-03 DIAGNOSIS — Z79.4 CURRENT USE OF INSULIN (HCC): ICD-10-CM

## 2023-11-03 DIAGNOSIS — I10 HYPERTENSION, UNSPECIFIED TYPE: ICD-10-CM

## 2023-11-03 DIAGNOSIS — E11.42 TYPE 2 DIABETES MELLITUS WITH DIABETIC POLYNEUROPATHY, WITH LONG-TERM CURRENT USE OF INSULIN (HCC): ICD-10-CM

## 2023-11-03 DIAGNOSIS — Z79.4 TYPE 2 DIABETES MELLITUS WITH DIABETIC POLYNEUROPATHY, WITH LONG-TERM CURRENT USE OF INSULIN (HCC): ICD-10-CM

## 2023-11-03 DIAGNOSIS — I25.10 CORONARY ARTERY DISEASE INVOLVING NATIVE HEART WITHOUT ANGINA PECTORIS, UNSPECIFIED VESSEL OR LESION TYPE: ICD-10-CM

## 2023-11-03 DIAGNOSIS — N18.32 STAGE 3B CHRONIC KIDNEY DISEASE (HCC): ICD-10-CM

## 2023-11-03 DIAGNOSIS — I10 PRIMARY HYPERTENSION: ICD-10-CM

## 2023-11-03 DIAGNOSIS — E03.9 HYPOTHYROIDISM, UNSPECIFIED TYPE: ICD-10-CM

## 2023-11-03 RX ORDER — CITALOPRAM 20 MG/1
20 TABLET ORAL DAILY
OUTPATIENT
Start: 2023-11-03

## 2023-11-03 RX ORDER — LEVOTHYROXINE SODIUM 0.05 MG/1
50 TABLET ORAL DAILY
Qty: 90 TABLET | Refills: 1 | Status: SHIPPED | OUTPATIENT
Start: 2023-11-03

## 2023-11-03 RX ORDER — LOSARTAN POTASSIUM 25 MG/1
25 TABLET ORAL DAILY
Qty: 90 TABLET | Refills: 0 | Status: SHIPPED | OUTPATIENT
Start: 2023-11-03

## 2023-11-03 RX ORDER — HYDRALAZINE HYDROCHLORIDE 25 MG/1
25 TABLET, FILM COATED ORAL 3 TIMES DAILY
Qty: 90 TABLET | Refills: 0 | Status: SHIPPED | OUTPATIENT
Start: 2023-11-03

## 2023-11-03 RX ORDER — ASPIRIN 81 MG/1
81 TABLET, COATED ORAL DAILY
Qty: 90 TABLET | Refills: 1 | Status: SHIPPED | OUTPATIENT
Start: 2023-11-03

## 2023-11-03 RX ORDER — ISOSORBIDE MONONITRATE 30 MG/1
30 TABLET, EXTENDED RELEASE ORAL EVERY EVENING
Qty: 90 TABLET | Refills: 1 | Status: SHIPPED | OUTPATIENT
Start: 2023-11-03

## 2023-11-03 RX ORDER — CARVEDILOL 25 MG/1
25 TABLET ORAL 2 TIMES DAILY WITH MEALS
Qty: 180 TABLET | Refills: 1 | Status: SHIPPED | OUTPATIENT
Start: 2023-11-03

## 2023-11-03 RX ORDER — ATORVASTATIN CALCIUM 40 MG/1
40 TABLET, FILM COATED ORAL DAILY
Qty: 90 TABLET | Refills: 1 | Status: SHIPPED | OUTPATIENT
Start: 2023-11-03

## 2023-11-08 ENCOUNTER — PATIENT OUTREACH (OUTPATIENT)
Dept: FAMILY MEDICINE CLINIC | Facility: CLINIC | Age: 63
End: 2023-11-08

## 2023-11-08 NOTE — PROGRESS NOTES
Chart review indicates that a referral was made on 7/5 by AUGUSTO Rehman for behavioral health services from [facility] coordinator. RN Mary Goncalves is on pt caseload and follows up with pt regularly. No PHQ-9 evaluations done at recent OV's. Pt does have depression diagnosis listed and is prescribed celexa. [address] [place] [address] was involved 5/23 for waiver and [facility] meals and pt was connected with her [facility]  who was assisting to have aides put in place. SWCM outreached pt today to discuss above, pt did not , VM left. SWCM to follow.

## 2023-11-14 DIAGNOSIS — K59.00 CONSTIPATION, UNSPECIFIED CONSTIPATION TYPE: Primary | ICD-10-CM

## 2023-11-14 RX ORDER — LACTULOSE 20 G/30ML
20 SOLUTION ORAL DAILY PRN
Qty: 946 ML | Refills: 1 | Status: SHIPPED | OUTPATIENT
Start: 2023-11-14

## 2023-11-15 ENCOUNTER — PATIENT OUTREACH (OUTPATIENT)
Dept: FAMILY MEDICINE CLINIC | Facility: CLINIC | Age: 63
End: 2023-11-15

## 2023-11-15 NOTE — PROGRESS NOTES
I called the patient but received voicemail. Message was left stating I will call back at another time.

## 2023-11-17 ENCOUNTER — PATIENT OUTREACH (OUTPATIENT)
Dept: FAMILY MEDICINE CLINIC | Facility: CLINIC | Age: 63
End: 2023-11-17

## 2023-11-17 NOTE — PROGRESS NOTES
CANDACE SNOW called pt to follow up with referral that was received to assist with getting pt connected to OP . CANDACE SNOW was able to speak to pt and introduced self, role and reason for call. Pt expressed that she is interested in getting connected to OP [address] [place] [address] services. Pt confirmed insurance as [employer]. CANDACE SNOW informed pt about waitlist and pt was in agreement. CANDACE SNOW discussed [facility] and pt was in agreement with being placed on wait list.     Pt inquired if pt had any other questions or concerns regarding housing, food, transport etc and pt stated that they did not. CANDACE SNOW routed note to provider and requested that referral be sent for [facility]. CANDACE SNOW will close referral at this time as they are no other needs. CANDACE SNOW will be available for any additional needs as requested.

## 2023-11-22 ENCOUNTER — PATIENT OUTREACH (OUTPATIENT)
Dept: FAMILY MEDICINE CLINIC | Facility: CLINIC | Age: 63
End: 2023-11-22

## 2023-11-22 NOTE — PROGRESS NOTES
I called the patient to follow up. She was currently at dialysis as her scheduled changed this week because of the holiday. The patient states she has been feeling well. She reports her blood sugars have been stable but does not recall any readings. She notes she has been taking trulicity and humalog as ordered but has increased tresiba to 40U bid. The patient notes she needs a refill of her sensors; note sent to pharmacist (patient is to call DME for refills--Yenny informed). The patient states she is watching her diet and notes Yenny has been checking her feet daily. She is unaware of her weight which is regulated at dialysis. I reminded the patient she is due for a lab draw. I will continue to remind her. I will continue to follow.

## 2023-11-29 ENCOUNTER — TELEPHONE (OUTPATIENT)
Dept: UROLOGY | Facility: CLINIC | Age: 63
End: 2023-11-29

## 2023-11-29 DIAGNOSIS — B37.9 YEAST INFECTION: Primary | ICD-10-CM

## 2023-11-29 RX ORDER — NYSTATIN 100000 [USP'U]/G
POWDER TOPICAL 3 TIMES DAILY
Qty: 30 G | Refills: 3 | Status: SHIPPED | OUTPATIENT
Start: 2023-11-29

## 2023-11-29 NOTE — TELEPHONE ENCOUNTER
Please see refill request for nystatin powder.  [facility] is where the orders need to go

## 2023-11-29 NOTE — TELEPHONE ENCOUNTER
Patient called asking for a refill of her Nystatin powder. Patient with SPT and uses under skin folds. Please send to [address]   Thanks

## 2023-11-30 ENCOUNTER — PATIENT OUTREACH (OUTPATIENT)
Dept: FAMILY MEDICINE CLINIC | Facility: CLINIC | Age: 63
End: 2023-11-30

## 2023-11-30 DIAGNOSIS — F11.20 CONTINUOUS OPIOID DEPENDENCE (HCC): ICD-10-CM

## 2023-11-30 DIAGNOSIS — G89.4 CHRONIC PAIN SYNDROME: ICD-10-CM

## 2023-11-30 RX ORDER — OXYCODONE HYDROCHLORIDE 5 MG/1
7.5 TABLET ORAL EVERY 8 HOURS PRN
Qty: 120 TABLET | Refills: 0 | Status: SHIPPED | OUTPATIENT
Start: 2023-11-30

## 2023-12-13 ENCOUNTER — PATIENT OUTREACH (OUTPATIENT)
Dept: FAMILY MEDICINE CLINIC | Facility: CLINIC | Age: 63
End: 2023-12-13

## 2023-12-13 NOTE — PROGRESS NOTES
I called the patient to remind her of her Uro appointment on Friday; she was aware. I also reminded her to have labs drawn. I suggested she go to the lab prior to her Uro appointment since there is a lab located in the same building. I asked her to be at least 10 hours fasting for the draw. The patient reports a 2 day history of dysuria and urgency. She denies fever or back pain. She notes she does not urinate often and reports the urine in her bag is dark with sediment; note sent to PCP. I will continue to follow.

## 2023-12-15 ENCOUNTER — PATIENT OUTREACH (OUTPATIENT)
Dept: FAMILY MEDICINE CLINIC | Facility: CLINIC | Age: 63
End: 2023-12-15

## 2023-12-15 ENCOUNTER — PROCEDURE VISIT (OUTPATIENT)
Dept: UROLOGY | Facility: CLINIC | Age: 63
End: 2023-12-15
Payer: COMMERCIAL

## 2023-12-15 VITALS
WEIGHT: 250 LBS | SYSTOLIC BLOOD PRESSURE: 134 MMHG | OXYGEN SATURATION: 98 % | RESPIRATION RATE: 20 BRPM | HEIGHT: 67 IN | BODY MASS INDEX: 39.24 KG/M2 | DIASTOLIC BLOOD PRESSURE: 70 MMHG | HEART RATE: 74 BPM

## 2023-12-15 DIAGNOSIS — N31.9 NEUROGENIC BLADDER: Primary | ICD-10-CM

## 2023-12-15 PROCEDURE — 51705 CHANGE OF BLADDER TUBE: CPT

## 2023-12-15 NOTE — PROGRESS NOTES
12/15/2023    Raquel Salvador  1960  [id number]    Diagnosis  Chief Complaint    Neurogenic Bladder         Patient presents for SPT change managed by Dr. Irma Adam  Follow up in 6 weeks for SPT change    Procedure: Suprapubic Tube Change       Cystostomy tube change     Date/Time  12/15/2023 11:00 AM     Performed by  Mickey Diane RN   Authorized by  Dennie Leep, MD     Universal Protocol   Consent: Verbal consent obtained. Consent given by: patient     Procedure Details   Patient tolerance: patient tolerated the procedure well with no immediate complications               Current catheter removed without difficulty after deflation of an intact balloon. Site prepped with Hibiclens, new 16F  suprapubic spt change via aseptic technique without incident, 10 ml balloon inflated with sterile water. irrigated easily for clear return, mild spasm noted. Patient tolerated well. Attached to drainage bag.    16 fr silicone SPT placed  Vitals:    12/15/23 1108   BP: 134/70   Pulse: 74   Resp: 20   SpO2: 98%   Weight: 113 kg (250 lb)   Height: 5' 7" (1.702 m)         Mickey Diane RN

## 2023-12-15 NOTE — PROGRESS NOTES
I called the patient to remind her to get labs drawn after her Uro appointment. I also informed her PCP needs a urine sample before ordering antibiotics. I encouraged her to ask Uro since she is still at her appointment. Otherwise, she will need an appointment.

## 2023-12-18 ENCOUNTER — PATIENT OUTREACH (OUTPATIENT)
Dept: FAMILY MEDICINE CLINIC | Facility: CLINIC | Age: 63
End: 2023-12-18

## 2023-12-18 NOTE — PROGRESS NOTES
I called the patient but received voicemail.  Message was left reminding her of her appointment with the Pharmacist today at 4pm.  I also reminded her to have labs drawn and the need to be fasting.    I will continue to follow.

## 2023-12-22 ENCOUNTER — PATIENT OUTREACH (OUTPATIENT)
Dept: FAMILY MEDICINE CLINIC | Facility: CLINIC | Age: 63
End: 2023-12-22

## 2023-12-22 NOTE — PROGRESS NOTES
I called the patient to remind her of getting her labs drawn so the results will be in to review at her upcoming PCP appointment in 2 weeks.  I also asked the patient if she wanted to reschedule with the Pharmacist; she stated she will call to reschedule.    I will continue to follow.

## 2023-12-30 DIAGNOSIS — K21.9 GASTROESOPHAGEAL REFLUX DISEASE WITHOUT ESOPHAGITIS: ICD-10-CM

## 2023-12-30 DIAGNOSIS — M10.9 GOUT, UNSPECIFIED CAUSE, UNSPECIFIED CHRONICITY, UNSPECIFIED SITE: ICD-10-CM

## 2024-01-02 RX ORDER — ALLOPURINOL 100 MG/1
TABLET ORAL
Qty: 180 TABLET | Refills: 0 | Status: SHIPPED | OUTPATIENT
Start: 2024-01-02

## 2024-01-02 RX ORDER — PANTOPRAZOLE SODIUM 40 MG/1
40 TABLET, DELAYED RELEASE ORAL DAILY
Qty: 30 TABLET | Refills: 2 | Status: SHIPPED | OUTPATIENT
Start: 2024-01-02

## 2024-01-03 ENCOUNTER — OFFICE VISIT (OUTPATIENT)
Dept: FAMILY MEDICINE CLINIC | Facility: CLINIC | Age: 64
End: 2024-01-03

## 2024-01-03 VITALS
BODY MASS INDEX: 41.44 KG/M2 | WEIGHT: 264 LBS | TEMPERATURE: 98.8 F | HEART RATE: 80 BPM | SYSTOLIC BLOOD PRESSURE: 128 MMHG | HEIGHT: 67 IN | OXYGEN SATURATION: 95 % | RESPIRATION RATE: 20 BRPM | DIASTOLIC BLOOD PRESSURE: 74 MMHG

## 2024-01-03 DIAGNOSIS — Z79.4 TYPE 2 DIABETES MELLITUS WITH FOOT ULCER, WITH LONG-TERM CURRENT USE OF INSULIN (HCC): ICD-10-CM

## 2024-01-03 DIAGNOSIS — F11.20 CONTINUOUS OPIOID DEPENDENCE (HCC): ICD-10-CM

## 2024-01-03 DIAGNOSIS — F41.9 INSOMNIA SECONDARY TO ANXIETY: ICD-10-CM

## 2024-01-03 DIAGNOSIS — Z79.4 TYPE 2 DIABETES MELLITUS WITH CHRONIC KIDNEY DISEASE ON CHRONIC DIALYSIS, WITH LONG-TERM CURRENT USE OF INSULIN (HCC): Primary | ICD-10-CM

## 2024-01-03 DIAGNOSIS — I10 PRIMARY HYPERTENSION: ICD-10-CM

## 2024-01-03 DIAGNOSIS — Z79.4 CURRENT USE OF INSULIN (HCC): ICD-10-CM

## 2024-01-03 DIAGNOSIS — F51.05 INSOMNIA SECONDARY TO ANXIETY: ICD-10-CM

## 2024-01-03 DIAGNOSIS — N18.32 STAGE 3B CHRONIC KIDNEY DISEASE (HCC): ICD-10-CM

## 2024-01-03 DIAGNOSIS — I10 HYPERTENSION, UNSPECIFIED TYPE: ICD-10-CM

## 2024-01-03 DIAGNOSIS — I25.10 CORONARY ARTERY DISEASE INVOLVING NATIVE HEART WITHOUT ANGINA PECTORIS, UNSPECIFIED VESSEL OR LESION TYPE: ICD-10-CM

## 2024-01-03 DIAGNOSIS — F33.2 SEVERE EPISODE OF RECURRENT MAJOR DEPRESSIVE DISORDER, WITHOUT PSYCHOTIC FEATURES (HCC): ICD-10-CM

## 2024-01-03 DIAGNOSIS — L97.509 TYPE 2 DIABETES MELLITUS WITH FOOT ULCER, WITH LONG-TERM CURRENT USE OF INSULIN (HCC): ICD-10-CM

## 2024-01-03 DIAGNOSIS — G89.4 CHRONIC PAIN SYNDROME: ICD-10-CM

## 2024-01-03 DIAGNOSIS — E11.42 TYPE 2 DIABETES MELLITUS WITH DIABETIC POLYNEUROPATHY, WITH LONG-TERM CURRENT USE OF INSULIN (HCC): ICD-10-CM

## 2024-01-03 DIAGNOSIS — Z99.2 TYPE 2 DIABETES MELLITUS WITH CHRONIC KIDNEY DISEASE ON CHRONIC DIALYSIS, WITH LONG-TERM CURRENT USE OF INSULIN (HCC): Primary | ICD-10-CM

## 2024-01-03 DIAGNOSIS — R11.0 NAUSEA: ICD-10-CM

## 2024-01-03 DIAGNOSIS — Z23 ENCOUNTER FOR IMMUNIZATION: ICD-10-CM

## 2024-01-03 DIAGNOSIS — Z79.4 TYPE 2 DIABETES MELLITUS WITH DIABETIC POLYNEUROPATHY, WITH LONG-TERM CURRENT USE OF INSULIN (HCC): ICD-10-CM

## 2024-01-03 DIAGNOSIS — N18.6 TYPE 2 DIABETES MELLITUS WITH CHRONIC KIDNEY DISEASE ON CHRONIC DIALYSIS, WITH LONG-TERM CURRENT USE OF INSULIN (HCC): Primary | ICD-10-CM

## 2024-01-03 DIAGNOSIS — N18.6 ESRD (END STAGE RENAL DISEASE) (HCC): ICD-10-CM

## 2024-01-03 DIAGNOSIS — E11.22 TYPE 2 DIABETES MELLITUS WITH CHRONIC KIDNEY DISEASE ON CHRONIC DIALYSIS, WITH LONG-TERM CURRENT USE OF INSULIN (HCC): Primary | ICD-10-CM

## 2024-01-03 DIAGNOSIS — E11.621 TYPE 2 DIABETES MELLITUS WITH FOOT ULCER, WITH LONG-TERM CURRENT USE OF INSULIN (HCC): ICD-10-CM

## 2024-01-03 LAB — SL AMB POCT HEMOGLOBIN AIC: 6.3 (ref ?–6.5)

## 2024-01-03 PROCEDURE — 90471 IMMUNIZATION ADMIN: CPT

## 2024-01-03 PROCEDURE — 90686 IIV4 VACC NO PRSV 0.5 ML IM: CPT

## 2024-01-03 PROCEDURE — 99214 OFFICE O/P EST MOD 30 MIN: CPT | Performed by: FAMILY MEDICINE

## 2024-01-03 PROCEDURE — 83036 HEMOGLOBIN GLYCOSYLATED A1C: CPT | Performed by: FAMILY MEDICINE

## 2024-01-03 RX ORDER — HYDRALAZINE HYDROCHLORIDE 25 MG/1
25 TABLET, FILM COATED ORAL 3 TIMES DAILY
Qty: 90 TABLET | Refills: 0 | Status: SHIPPED | OUTPATIENT
Start: 2024-01-03

## 2024-01-03 RX ORDER — OXYCODONE HYDROCHLORIDE 5 MG/1
7.5 TABLET ORAL EVERY 8 HOURS PRN
Qty: 120 TABLET | Refills: 0 | Status: SHIPPED | OUTPATIENT
Start: 2024-01-03

## 2024-01-03 RX ORDER — LOSARTAN POTASSIUM 25 MG/1
25 TABLET ORAL DAILY
Qty: 90 TABLET | Refills: 0 | Status: SHIPPED | OUTPATIENT
Start: 2024-01-03

## 2024-01-03 RX ORDER — ONDANSETRON 4 MG/1
4 TABLET, FILM COATED ORAL EVERY 8 HOURS PRN
Qty: 12 TABLET | Refills: 0 | Status: SHIPPED | OUTPATIENT
Start: 2024-01-03

## 2024-01-03 RX ORDER — OLANZAPINE 5 MG/1
5 TABLET ORAL
Qty: 90 TABLET | Refills: 0 | Status: SHIPPED | OUTPATIENT
Start: 2024-01-03

## 2024-01-03 RX ORDER — INSULIN LISPRO 100 [IU]/ML
INJECTION, SOLUTION INTRAVENOUS; SUBCUTANEOUS
Qty: 15 ML | Refills: 2 | Status: SHIPPED | OUTPATIENT
Start: 2024-01-03

## 2024-01-03 NOTE — PATIENT INSTRUCTIONS
^^^^^^^^^^^^^^^^^^^^^^^^^^^  Your safety and wellbeing as well as that of those around you is important to us. Should you or someone you know be in need, here are some area resources that may help:    Do you feel like you might be in crisis and potentially need professional services immediately?     [place] Suicide Prevention Lifeline: Dial #980  If you prefer to text, you can reach the Crisis Text Line by texting “[place]” to 052169    ¿Te encuentras en crisis? Envía un mensaje de texto con la palabra AYUDA al [id number] para comunicarte de manera gratuita con un Consejero de Crisis  Apoyo gratuito las 24 horas del día, los 7 días de la semana, al alcance de tu mano.    Alternatively, you can Visit https://www.dhs.[place].gov/Services/Mental-Health-In-[place]/Documents/Suicide_Prevention_Hotlines.pdf to find your Critical access hospital's 24/7 crisis phone line.    Are you feeling overwhelmed, want to speak with a supportive person (NOT for crisis), and/or are you looking for additional resources?     [facility] Warmline: Call (236) [phone].  This Warmline provides telephone service for Adult residents (18 years and older) of [facility] who need emotional support, help with problem solving, or information and referral. (Hours 7 Days a week 6:00 AM and 2:00 AM)    [facility] [place]: 24/7 Helpline: [phone] or Toll-free: [phone] TTY: 525.116.8847 or online: Snabboteket.org/our-services/  [facility] is a safe place where ALL survivors of domestic and intimate partner abuse and their children can find refuge. [facility] offers confidential resources to individuals and families including supportive mental health services and connection to resources. We provide services in [place] and [facility].    [place] Coalition Against Domestic Violence: Dial 1-863.962.1140 or Visit https://www.pcadv.org  Among the services provided to domestic violence victims are: crisis intervention; counseling;  accompaniment to police, medical, and court facilities; and temporary emergency shelter for victims and their dependent children. Prevention and educational programs are provided to lessen the risk of domestic violence in the community at large.    Find a local Alcohol Anonymous meeting: Dial 1-262.704.6262 or Visit https://www.aaAtlas Local.Euroffice/qc-vkwltsd-zgpvoaji/[place]/  Find a local Narcotics Anonymous meeting: Visit https://www.na.org/meetingsearch/     [employer] is a website where you can look for accessible care and many other services including financial assistance, food pantries, medical care, and other free or reduced-cost resources in your area, even if you don't have insurance. Visit: https://NewHoundcommunityhealth.[place].Euroffice/    Career link is your resource for all things job and employment related. [facility], [place] [address]  Walk in or call Monday through Friday 8:00AM - 4:30 PM: [phone]/TTY: [phone]  www.careerlinkle[facility].org    If you need to connect with resources in your community, but don't know where to look,  is a great place to start. From help with a utilities bill, to housing assistance, after-school programs for kids, and more, you can dial 211 or text your zip code to [address] to talk with a  for free.    [facility]:  This resource assists individuals in need of connection to healthcare services of all types including primary care, substance dependence and acute psychiatric care on a limited basis.  Team Phone: [phone] or [phone]    [facility] Information & Referral Office is the entry point for [facility] Human Services departments and information about community resources. Professional Caseworkers will work with you to determination whether a referral requires further assessment and may either refer you to a community agency, or, complete a formal referral to a  Human Services specialized office (Aging and Adult Services, Children and Youth, Mental Health, Developmental Programs or Early Intervention).     [facility], [address] [place] [address]  Dial: [phone]  Walk-in hours are 8:30AM to 4:15PM, Monday through Friday-- no appointment is needed.    If you feel at risk of immediate harm to yourself or another, call 9-1-1 or go directly to the Emergency Department.  ^^^^^^^^^^^^^^^^^^^^^^^^^^^^^^ This is a short list of agencies that provide older adult day services in the local area. Many of these organizations provider other services as well for individuals age 60+. Day services typically include a daily meal, structured and unstructured social activities, and case management/support. Some organizations provide transportation as well; however, we encourage you to reach out directly to discuss what is available.    [facility]  [address] [place] [address]  Phone: [phone]  Web. https://[employer]/affiliate-home-[facility]-pa/    [facility] ([facility])  [address] [place] [address]  Phone: [phone], ext. [phone]  Website: https://www.[facility].org/rell-\A Chronology of [place] [facility]als\""-[facility]/    [facility]  [address] [place], [address], [place] [address]  Phone: [phone]  Website [employer]    [facility] Office of Aging and Adult Services   [address] [place] [address], [address]  [place] [address]  Https://www.[facility].org/Departments/Human-Services/Aging-and-Adult-Services/Programs/Adult-Day-Services

## 2024-01-03 NOTE — ASSESSMENT & PLAN NOTE
Sent a message to the PCP regarding pt's Oxycodone dosage  Will await response before adjusting any dosing  For now will refill Oxycodone 7.5 mg tid

## 2024-01-03 NOTE — ASSESSMENT & PLAN NOTE
PHQ 9: 9 today  Taking Celexa 40 mg and Zyprexa 5 mg  Depressive sx not well controlled  +passive suicidal ideation
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
## 2024-01-03 NOTE — ASSESSMENT & PLAN NOTE
Lab Results   Component Value Date    HGBA1C 6.3 01/03/2024     Stable  Current Medication: Tresiba 40 units bid and Humalog 20 units per meal, and Trulicity 3 mg weekly   Will Continue current medications  Disused hypoglycemia  Encouraged Diabetic Diet, Regular exercise, Weight loss

## 2024-01-08 ENCOUNTER — TELEPHONE (OUTPATIENT)
Dept: PSYCHIATRY | Facility: CLINIC | Age: 64
End: 2024-01-08

## 2024-01-08 NOTE — TELEPHONE ENCOUNTER
"Behavioral Health Outpatient Intake Questions    Referred By   : SELF     Please advise interviewee that they need to answer all questions truthfully to allow for best care, and any misrepresentations of information may affect their ability to be seen at this clinic   => Was this discussed? Yes     If Minor Child (under age 18)    Who is/are the legal guardian(s) of the child?     Is there a custody agreement? No     If \"YES\"- Custody orders must be obtained prior to scheduling the first appointment  In addition, Consent to Treatment must be signed by all legal guardians prior to scheduling the first appointment    If \"NO\"- Consent to Treatment must be signed by all legal guardians prior to scheduling the first appointment    Behavioral Health Outpatient Intake History -     Presenting Problem (in patient's own words): \" I just feel like I need therapy just to talk to someone. I have problems with depression\"     Are there any communication barriers for this patient?     No                                               If yes, please describe barriers:  N/a   If there is a unique situation, please refer to Se Valdivia/Jeniffer Connor for final determination.    Are you taking any psychiatric medications? Yes     If \"YES\" -What are they Celexa     If \"YES\" -Who prescribes?     Has the Patient previously received outpatient Talk Therapy or Medication Management from [facility]  Yes        If \"YES\"- When, Where and with Whom? \" A few years ago in [facility]\"        If \"NO\" -Has Patient received these services elsewhere?       If \"YES\" -When, Where, and with Whom?n/a    Has the Patient abused alcohol or other substances in the last 6 months ? No  No concerns of substance abuse are reported.     If \"YES\" -What substance, How much, How often?n/a     If illegal substance: Refer to [facility] (for EMELIA) or SHARE[facility]MAT Offices.   If Alcohol in excess of 10 drinks per week:  Refer to [facility] (for EMELIA) or SHARE/MAT " "Offices    Legal History-     Is this treatment court ordered? No   If \"yes \"send to :  Talk Therapy : Send to Se Valdivia/Jeniffer Connor for final determination   Med Management: Send to Dr Cabrera for final determination     Has the Patient been convicted of a felony?  No   If \"Yes\" send to -When, What?n/a  Talk Therapy : Send to Se Connor for final determination   Med Management: Send to Dr Cabrera for final determination     ACCEPTED as a patient Yes  If \"Yes\" Appointment Date: 1/15/2024     Referred Elsewhere? No  If “Yes” - (Where? Ex: [facility], [facility], [facility], [facility], etc.) N/a      Name of Insurance Co:[facility]   Insurance ID#[id number]   Insurance Phone #[phone]  If ins is primary or secondary?Primary   If patient is a minor, parents information such as Name, D.O.B of guarantor.  "

## 2024-01-17 ENCOUNTER — TRANSCRIBE ORDERS (OUTPATIENT)
Dept: RADIOLOGY | Facility: HOSPITAL | Age: 64
End: 2024-01-17

## 2024-01-17 ENCOUNTER — PREP FOR PROCEDURE (OUTPATIENT)
Dept: INTERVENTIONAL RADIOLOGY/VASCULAR | Facility: CLINIC | Age: 64
End: 2024-01-17

## 2024-01-17 DIAGNOSIS — N18.6 ESRD (END STAGE RENAL DISEASE) (HCC): Primary | ICD-10-CM

## 2024-01-23 DIAGNOSIS — I10 PRIMARY HYPERTENSION: ICD-10-CM

## 2024-01-23 DIAGNOSIS — N18.6 TYPE 2 DIABETES MELLITUS WITH CHRONIC KIDNEY DISEASE ON CHRONIC DIALYSIS, WITH LONG-TERM CURRENT USE OF INSULIN (HCC): Primary | ICD-10-CM

## 2024-01-23 DIAGNOSIS — Z79.4 TYPE 2 DIABETES MELLITUS WITH CHRONIC KIDNEY DISEASE ON CHRONIC DIALYSIS, WITH LONG-TERM CURRENT USE OF INSULIN (HCC): Primary | ICD-10-CM

## 2024-01-23 DIAGNOSIS — E11.22 TYPE 2 DIABETES MELLITUS WITH CHRONIC KIDNEY DISEASE ON CHRONIC DIALYSIS, WITH LONG-TERM CURRENT USE OF INSULIN (HCC): Primary | ICD-10-CM

## 2024-01-23 DIAGNOSIS — K59.00 CONSTIPATION, UNSPECIFIED CONSTIPATION TYPE: ICD-10-CM

## 2024-01-23 DIAGNOSIS — Z99.2 TYPE 2 DIABETES MELLITUS WITH CHRONIC KIDNEY DISEASE ON CHRONIC DIALYSIS, WITH LONG-TERM CURRENT USE OF INSULIN (HCC): Primary | ICD-10-CM

## 2024-01-23 RX ORDER — LACTULOSE 20 G/30ML
20 SOLUTION ORAL DAILY PRN
Qty: 946 ML | Refills: 3 | Status: SHIPPED | OUTPATIENT
Start: 2024-01-23

## 2024-01-23 RX ORDER — FUROSEMIDE 80 MG
160 TABLET ORAL DAILY
Qty: 90 TABLET | Refills: 3 | Status: SHIPPED | OUTPATIENT
Start: 2024-01-23

## 2024-01-24 RX ORDER — PEN NEEDLE, DIABETIC 32GX 5/32"
NEEDLE, DISPOSABLE MISCELLANEOUS
Qty: 100 EACH | Refills: 1 | Status: SHIPPED | OUTPATIENT
Start: 2024-01-24

## 2024-01-26 ENCOUNTER — PATIENT OUTREACH (OUTPATIENT)
Dept: FAMILY MEDICINE CLINIC | Facility: CLINIC | Age: 64
End: 2024-01-26

## 2024-01-26 NOTE — PROGRESS NOTES
I called the patient but received voicemail.  Message was left stating I will call back next week.

## 2024-02-02 ENCOUNTER — PROCEDURE VISIT (OUTPATIENT)
Dept: UROLOGY | Facility: CLINIC | Age: 64
End: 2024-02-02
Payer: COMMERCIAL

## 2024-02-02 ENCOUNTER — PATIENT OUTREACH (OUTPATIENT)
Dept: FAMILY MEDICINE CLINIC | Facility: CLINIC | Age: 64
End: 2024-02-02

## 2024-02-02 VITALS
BODY MASS INDEX: 40.02 KG/M2 | HEIGHT: 67 IN | DIASTOLIC BLOOD PRESSURE: 70 MMHG | RESPIRATION RATE: 20 BRPM | SYSTOLIC BLOOD PRESSURE: 120 MMHG | WEIGHT: 255 LBS | HEART RATE: 76 BPM | OXYGEN SATURATION: 97 %

## 2024-02-02 DIAGNOSIS — N31.9 NEUROGENIC BLADDER: Primary | ICD-10-CM

## 2024-02-02 PROCEDURE — 51705 CHANGE OF BLADDER TUBE: CPT
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
## 2024-02-02 NOTE — PROGRESS NOTES
"2/2/2024    Dee Dee Shaffer  1960  [id number]    Diagnosis  Chief Complaint    Neurogenic Bladder         Patient presents for SPT change managed by Dr. Tavarez    Plan  Follow up in 6 weeks for SPT change    Procedure: Suprapubic Tube Change       Cystostomy tube change     Date/Time  2/2/2024 10:30 AM     Performed by  Iveth Jerry RN   Authorized by  Minor Tavarez MD     [place] Protocol   Consent: Verbal consent obtained.  Consent given by: patient     Procedure Details   Patient tolerance: patient tolerated the procedure well with no immediate complications               Current catheter removed without difficulty after deflation of an intact balloon. Site prepped with Hibiclens, new 16F  siliconesuprapubic spt change via aseptic technique without incident, 10 ml balloon inflated with sterile water. irrigated easily for straw-yellow return, mild spasm noted. Patient tolerated well. Attached to drainage bag.     Vitals:    02/02/24 1035   BP: 120/70   Pulse: 76   Resp: 20   SpO2: 97%   Weight: 116 kg (255 lb)   Height: 5' 7\" (1.702 m)         Iveth Jerry RN   "

## 2024-02-05 ENCOUNTER — PATIENT OUTREACH (OUTPATIENT)
Dept: FAMILY MEDICINE CLINIC | Facility: CLINIC | Age: 64
End: 2024-02-05

## 2024-02-05 DIAGNOSIS — F11.20 CONTINUOUS OPIOID DEPENDENCE (HCC): ICD-10-CM

## 2024-02-05 DIAGNOSIS — G89.4 CHRONIC PAIN SYNDROME: ICD-10-CM

## 2024-02-05 PROBLEM — F33.2 SEVERE EPISODE OF RECURRENT MAJOR DEPRESSIVE DISORDER, WITHOUT PSYCHOTIC FEATURES (HCC): Status: ACTIVE | Noted: 2020-09-02

## 2024-02-05 PROBLEM — I25.118 CORONARY ARTERY DISEASE WITH STABLE ANGINA PECTORIS (HCC): Status: ACTIVE | Noted: 2022-02-10

## 2024-02-05 PROBLEM — Z93.59 SUPRAPUBIC CATHETER (HCC): Status: ACTIVE | Noted: 2022-02-10

## 2024-02-05 PROBLEM — I13.2 HYPERTENSIVE HEART AND KIDNEY DISEASE WITH HEART FAILURE AND END-STAGE RENAL FAILURE (HCC): Status: ACTIVE | Noted: 2020-12-21

## 2024-02-05 PROBLEM — N25.81 SECONDARY HYPERPARATHYROIDISM OF RENAL ORIGIN (HCC): Status: ACTIVE | Noted: 2024-02-05

## 2024-02-05 PROBLEM — N18.6 HYPERTENSIVE HEART AND KIDNEY DISEASE WITH HEART FAILURE AND END-STAGE RENAL FAILURE (HCC): Status: ACTIVE | Noted: 2020-12-21

## 2024-02-05 PROBLEM — I25.118 CORONARY ARTERY DISEASE WITH STABLE ANGINA PECTORIS (HCC): Status: ACTIVE | Noted: 2020-09-02

## 2024-02-06 DIAGNOSIS — L03.012 CELLULITIS OF FINGER OF LEFT HAND: Primary | ICD-10-CM

## 2024-02-06 RX ORDER — CEPHALEXIN 500 MG/1
500 CAPSULE ORAL EVERY 12 HOURS SCHEDULED
Qty: 14 CAPSULE | Refills: 0 | Status: SHIPPED | OUTPATIENT
Start: 2024-02-06 | End: 2024-02-13

## 2024-02-07 RX ORDER — OXYCODONE HYDROCHLORIDE 5 MG/1
7.5 TABLET ORAL EVERY 8 HOURS PRN
Qty: 120 TABLET | Refills: 0 | Status: SHIPPED | OUTPATIENT
Start: 2024-02-07

## 2024-02-08 ENCOUNTER — TELEPHONE (OUTPATIENT)
Dept: INTERVENTIONAL RADIOLOGY/VASCULAR | Facility: HOSPITAL | Age: 64
End: 2024-02-08

## 2024-02-09 ENCOUNTER — TELEPHONE (OUTPATIENT)
Dept: RADIOLOGY | Facility: HOSPITAL | Age: 64
End: 2024-02-09

## 2024-02-12 DIAGNOSIS — Z99.2 TYPE 2 DIABETES MELLITUS WITH CHRONIC KIDNEY DISEASE ON CHRONIC DIALYSIS, WITH LONG-TERM CURRENT USE OF INSULIN (HCC): Primary | ICD-10-CM

## 2024-02-12 DIAGNOSIS — E11.22 TYPE 2 DIABETES MELLITUS WITH CHRONIC KIDNEY DISEASE ON CHRONIC DIALYSIS, WITH LONG-TERM CURRENT USE OF INSULIN (HCC): Primary | ICD-10-CM

## 2024-02-12 DIAGNOSIS — N18.6 TYPE 2 DIABETES MELLITUS WITH CHRONIC KIDNEY DISEASE ON CHRONIC DIALYSIS, WITH LONG-TERM CURRENT USE OF INSULIN (HCC): Primary | ICD-10-CM

## 2024-02-12 DIAGNOSIS — Z79.4 TYPE 2 DIABETES MELLITUS WITH CHRONIC KIDNEY DISEASE ON CHRONIC DIALYSIS, WITH LONG-TERM CURRENT USE OF INSULIN (HCC): Primary | ICD-10-CM

## 2024-02-12 NOTE — TELEPHONE ENCOUNTER
Trulicity convert to ozempic convo.  Asked patient why she is interested in this.  She states even though her glucose is relatively well-controlled, she would like to be healthier overall and is interested in additional weight loss.  Ozempic is on her formulary.    Recommend switching directly to Ozempic 0.5 mg once weekly for 4 weeks.  Would then increase dose to 1 mg weekly.  This would then be a therapeutically stronger dose than Trulicity 3 mg weekly.  Could consider increasing Ozempic to 2 mg in the future, but may not be necessary.    Additionally, patient inquires about her pain therapy.  She requests 10 mg of oxycodone instead of 7.5 so she does not have to break her tablets in half.  Reviewed some of the risks and downsides of oxycodone.  Had patient describe pain, some sounds like it does have a neuropathy component.  Inquired if patient has ever been on gabapentin.  She was in the past and stopped due to renal function.  She is currently on dialysis, could consider restarting gabapentin and dosing conservatively.  Standard dose is 100 mg 3 times per week after hemodialysis.  Can titrate to 300 mg 3 times per week given after hemodialysis days.  No literature to support giving any more than 300 mg/day.  Explained to patient she may need to schedule a visit with Madhuri before anything is sent in    Luther Pena, Kat, BCACP  Ambulatory Care Clinical Pharmacist

## 2024-02-28 ENCOUNTER — HOSPITAL ENCOUNTER (OUTPATIENT)
Dept: INTERVENTIONAL RADIOLOGY/VASCULAR | Facility: HOSPITAL | Age: 64
Discharge: HOME | End: 2024-02-28
Attending: STUDENT IN AN ORGANIZED HEALTH CARE EDUCATION/TRAINING PROGRAM
Payer: COMMERCIAL

## 2024-02-28 VITALS
DIASTOLIC BLOOD PRESSURE: 68 MMHG | TEMPERATURE: 97.9 F | RESPIRATION RATE: 18 BRPM | SYSTOLIC BLOOD PRESSURE: 120 MMHG | OXYGEN SATURATION: 96 % | HEART RATE: 69 BPM

## 2024-02-28 DIAGNOSIS — N18.6 ESRD (END STAGE RENAL DISEASE) (HCC): ICD-10-CM

## 2024-02-28 LAB — GLUCOSE SERPL-MCNC: 136 MG/DL (ref 65–140)

## 2024-02-28 PROCEDURE — 36902 INTRO CATH DIALYSIS CIRCUIT: CPT

## 2024-02-28 PROCEDURE — 82948 REAGENT STRIP/BLOOD GLUCOSE: CPT

## 2024-02-28 PROCEDURE — 99152 MOD SED SAME PHYS/QHP 5/>YRS: CPT

## 2024-02-28 PROCEDURE — C1725 CATH, TRANSLUMIN NON-LASER: HCPCS

## 2024-02-28 PROCEDURE — C1894 INTRO/SHEATH, NON-LASER: HCPCS

## 2024-02-28 PROCEDURE — 99153 MOD SED SAME PHYS/QHP EA: CPT

## 2024-02-28 PROCEDURE — C2623 CATH, TRANSLUMIN, DRUG-COAT: HCPCS

## 2024-02-28 PROCEDURE — 99152 MOD SED SAME PHYS/QHP 5/>YRS: CPT | Performed by: RADIOLOGY

## 2024-02-28 PROCEDURE — C1887 CATHETER, GUIDING: HCPCS

## 2024-02-28 PROCEDURE — C1769 GUIDE WIRE: HCPCS

## 2024-02-28 PROCEDURE — 36902 INTRO CATH DIALYSIS CIRCUIT: CPT | Performed by: RADIOLOGY

## 2024-02-28 RX ORDER — LIDOCAINE WITH 8.4% SOD BICARB 0.9%(10ML)
SYRINGE (ML) INJECTION AS NEEDED
Status: COMPLETED | OUTPATIENT
Start: 2024-02-28 | End: 2024-02-28

## 2024-02-28 RX ORDER — MIDAZOLAM HYDROCHLORIDE 2 MG/2ML
INJECTION, SOLUTION INTRAMUSCULAR; INTRAVENOUS AS NEEDED
Status: COMPLETED | OUTPATIENT
Start: 2024-02-28 | End: 2024-02-28

## 2024-02-28 RX ORDER — FENTANYL CITRATE 50 UG/ML
INJECTION, SOLUTION INTRAMUSCULAR; INTRAVENOUS AS NEEDED
Status: COMPLETED | OUTPATIENT
Start: 2024-02-28 | End: 2024-02-28

## 2024-02-28 RX ADMIN — Medication 3 ML: at 11:01

## 2024-02-28 RX ADMIN — FENTANYL CITRATE 50 MCG: 50 INJECTION, SOLUTION INTRAMUSCULAR; INTRAVENOUS at 11:03

## 2024-02-28 RX ADMIN — FENTANYL CITRATE 50 MCG: 50 INJECTION, SOLUTION INTRAMUSCULAR; INTRAVENOUS at 11:19

## 2024-02-28 RX ADMIN — IOHEXOL 36 ML: 350 INJECTION, SOLUTION INTRAVENOUS at 11:48

## 2024-02-28 RX ADMIN — MIDAZOLAM 1 MG: 1 INJECTION INTRAMUSCULAR; INTRAVENOUS at 11:03

## 2024-02-28 RX ADMIN — Medication 3 ML: at 11:20

## 2024-02-28 RX ADMIN — MIDAZOLAM 1 MG: 1 INJECTION INTRAMUSCULAR; INTRAVENOUS at 11:19

## 2024-02-28 NOTE — NURSING NOTE
Positive bruit/thrill and radial pulse on left. Woggles (2) and dressing clean, dry and intact. No drainage. Denies pain.

## 2024-02-28 NOTE — NURSING NOTE
No distress. Denies pain. Woggles (2) and dressing clean, dry and intact. No drainage. Positive bruit/thrill and radial pulse on left arm.

## 2024-02-28 NOTE — H&P
Interventional Radiology  History and Physical 2/28/2024     Dee Dee Shaffer   1960   [id number]    H&P reviewed. There have been no interval changes since the time the H&P was written.    There were no vitals taken for this visit.    Prior imaging was reviewed.  Presents today for evaluation of left upper arm brachiocephalic fistula for inadequacy.  She has undergone cephalic vein transposition since her prior fistulogram.  She had angioplasty of her anastomosis on prior intervention.  Today, there is an excellent thrill peripherally though the remainder of the fistula is only pulsatile.    Procedure discussed with her and all questions answered.    Informed written consent was obtained.    Joon Castrejon MD

## 2024-02-28 NOTE — BRIEF OP NOTE (RAD/CATH)
INTERVENTIONAL RADIOLOGY PROCEDURE NOTE    Date: 2/28/2024    Procedure:   Procedure Summary       Date: 02/28/24 Room / Location: [facility] Interventional Radiology    Anesthesia Start:  Anesthesia Stop:     Procedure: IR AV FISTULAGRAM/GRAFTOGRAM Diagnosis:       ESRD (end stage renal disease) (HCC)      (low adequacy)    Scheduled Providers:  Responsible Provider:     Anesthesia Type: Not recorded ASA Status: Not recorded            Preoperative diagnosis:   1. ESRD (end stage renal disease) (HCC)         Postoperative diagnosis: Same.    Surgeon: Joon Castrejon MD     Assistant: None. No qualified resident was available.    Blood loss: Minimal    Specimens: None    Findings:     Severe cephalic arch stenosis successfully treated with 10 mm angioplasty and DCB    AV anastomotic angioplasty to 7 mm to maximize inflow    Complications: None immediate.    Anesthesia: conscious sedation

## 2024-02-28 NOTE — NURSING NOTE
Woggles (2) removed by IR staff member. Dressings clean, dry and intact over sites. Positive bruit/thrill and radial pulse. Denies pain.

## 2024-02-28 NOTE — DISCHARGE INSTRUCTIONS
Fistulagram   WHAT YOU NEED TO KNOW:   Your arm or leg my  be sore, swollen, and bruised after the procedure. This is normal and should get better in a few days.     DISCHARGE INSTRUCTIONS:     Contact Interventional Radiology at [phone] and follow prompts if you experience any:    You have a fever or chills.    Your puncture site is red, swollen, or draining pus.    You have nausea or are vomiting.    Your skin is itchy, swollen, or you have a rash.    You cannot feel a thrill over your graft or fistula.     You have questions or concerns about your condition or care.    Seek care immediately if:     You have bleeding that does not stop after 10 minutes of holding firm, direct pressure over the puncture site.    Blood soaks through your bandage.    Your hand or foot closest to the graft or fistula feels cold, painful, or numb.     Your hand or foot closest to the graft or fistula is pale or blue.     You have trouble moving your arm or leg closest to the graft or fistula.     Your bruise suddenly gets bigger.    Care for your wound as directed:  Remove the bandage in 4 to 6 hours or as         directed. Wash the area once a day with soap and water. Gently pat the area dry.     Apply firm, steady pressure to the puncture site if it bleeds.  Use a clean gauze or towel to hold pressure for 10 to 15 minutes. Call 911 if you cannot stop the bleeding or the bleeding gets heavier.     Feel for a thrill once a day or as directed.  Place your index and second finger over your fistula or graft as directed. You should feel a vibration. The vibration means that blood is flowing through your graft or fistula correctly.     Rest your arm or leg as directed.  Do not lift anything heavier than 5 pounds or do strenuous activity for 24 hours.     Prevent damage to your graft or fistula.  Do not wear tight-fitting clothing over your graft or fistula. Do not wear tight jewelry on the arm or leg with the graft or fistula. Tell  healthcare providers not to do, IVs, blood draws, and blood pressure readings in the arm with your graft or fistula. Do not allow flu shots or vaccinations in your arm with your graft or fistula.    Follow up with your healthcare provider as directedProcedural Sedation   WHAT YOU NEED TO KNOW:   Procedural sedation is medicine used during procedures to help you feel relaxed and calm. You will remember little to none of the procedure. After sedation you may feel tired, weak, or unsteady on your feet. You may also have trouble concentrating or short-term memory loss. These symptoms should go away in 24 hours or less.   DISCHARGE INSTRUCTIONS:     Call 911 or have someone else call for any of the following:   You have sudden trouble breathing.     You cannot be woken.      Contact Interventional Radiology at [phone]   ([facility] PATIENTS: Contact Interventional Radiology at [phone]) ([facility] PATIENTS: Contact Interventional Radiology at [phone]) if any of the following occur:     You have a severe headache or dizziness.     Your heart is beating faster than usual.    You have a fever or chills.     Your skin is itchy, swollen, or you have a rash.     You have nausea or are vomiting for more than 8 hours after the procedure.      You have questions or concerns about your condition or care.  Self-care:   Have someone stay with you for 24 hours. This person can drive you to errands and help you do things around the house. This person can also watch for problems.      Rest and do quiet activities for 24 hours. Do not exercise, ride a bike, or play sports. Stand up slowly to prevent dizziness and falls. Take short walks around the house with another person. Slowly return to your usual activities the next day.      Do not drive or use dangerous machines or tools for 24 hours. You may injure yourself or others. Examples include a lawnmower, saw, or drill. Do not return to work for 24 hours if you use dangerous  machines or tools for work.      Do not make important decisions for 24 hours. For example, do not sign important papers or invest money.      Drink liquids as directed. Liquids help flush the sedation medicine out of your body. Ask how much liquid to drink each day and which liquids are best for you.      Eat small, frequent meals to prevent nausea and vomiting. Start with clear liquids such as juice or broth. If you do not vomit after clear liquids, you can eat your usual foods.      Do not drink alcohol or take medicines that make you drowsy. This includes medicines that help you sleep and anxiety medicines. Ask your healthcare provider if it is safe for you to take pain medicine.  Follow up with your healthcare provider as directed: Write down your questions so you remember to ask them during your visits. Procedural Sedation   WHAT YOU NEED TO KNOW:   Procedural sedation is medicine used during procedures to help you feel relaxed and calm. You will remember little to none of the procedure. After sedation you may feel tired, weak, or unsteady on your feet. You may also have trouble concentrating or short-term memory loss. These symptoms should go away in 24 hours or less.   DISCHARGE INSTRUCTIONS:     Call 911 or have someone else call for any of the following:   You have sudden trouble breathing.     You cannot be woken.      Contact Interventional Radiology at [phone]   ([facility] PATIENTS: Contact Interventional Radiology at [phone]) ([facility] PATIENTS: Contact Interventional Radiology at [phone]) if any of the following occur:     You have a severe headache or dizziness.     Your heart is beating faster than usual.    You have a fever or chills.     Your skin is itchy, swollen, or you have a rash.     You have nausea or are vomiting for more than 8 hours after the procedure.      You have questions or concerns about your condition or care.  Self-care:   Have someone stay with you for 24 hours. This  person can drive you to errands and help you do things around the house. This person can also watch for problems.      Rest and do quiet activities for 24 hours. Do not exercise, ride a bike, or play sports. Stand up slowly to prevent dizziness and falls. Take short walks around the house with another person. Slowly return to your usual activities the next day.      Do not drive or use dangerous machines or tools for 24 hours. You may injure yourself or others. Examples include a lawnmower, saw, or drill. Do not return to work for 24 hours if you use dangerous machines or tools for work.      Do not make important decisions for 24 hours. For example, do not sign important papers or invest money.      Drink liquids as directed. Liquids help flush the sedation medicine out of your body. Ask how much liquid to drink each day and which liquids are best for you.      Eat small, frequent meals to prevent nausea and vomiting. Start with clear liquids such as juice or broth. If you do not vomit after clear liquids, you can eat your usual foods.      Do not drink alcohol or take medicines that make you drowsy. This includes medicines that help you sleep and anxiety medicines. Ask your healthcare provider if it is safe for you to take pain medicine.  Follow up with your healthcare provider as directed: Write down your questions so you remember to ask them during your visits.

## 2024-03-02 DIAGNOSIS — I25.10 CORONARY ARTERY DISEASE INVOLVING NATIVE HEART WITHOUT ANGINA PECTORIS, UNSPECIFIED VESSEL OR LESION TYPE: ICD-10-CM

## 2024-03-02 DIAGNOSIS — E11.42 TYPE 2 DIABETES MELLITUS WITH DIABETIC POLYNEUROPATHY, WITH LONG-TERM CURRENT USE OF INSULIN (HCC): ICD-10-CM

## 2024-03-02 DIAGNOSIS — Z79.4 CURRENT USE OF INSULIN (HCC): ICD-10-CM

## 2024-03-02 DIAGNOSIS — Z79.4 TYPE 2 DIABETES MELLITUS WITH DIABETIC POLYNEUROPATHY, WITH LONG-TERM CURRENT USE OF INSULIN (HCC): ICD-10-CM

## 2024-03-02 DIAGNOSIS — N18.32 STAGE 3B CHRONIC KIDNEY DISEASE (HCC): ICD-10-CM

## 2024-03-04 RX ORDER — CLOPIDOGREL BISULFATE 75 MG/1
75 TABLET ORAL DAILY
Qty: 30 TABLET | Refills: 1 | Status: SHIPPED | OUTPATIENT
Start: 2024-03-04

## 2024-03-06 ENCOUNTER — PATIENT OUTREACH (OUTPATIENT)
Dept: FAMILY MEDICINE CLINIC | Facility: CLINIC | Age: 64
End: 2024-03-06

## 2024-03-06 ENCOUNTER — OFFICE VISIT (OUTPATIENT)
Dept: FAMILY MEDICINE CLINIC | Facility: CLINIC | Age: 64
End: 2024-03-06

## 2024-03-06 ENCOUNTER — TELEPHONE (OUTPATIENT)
Dept: FAMILY MEDICINE CLINIC | Facility: CLINIC | Age: 64
End: 2024-03-06

## 2024-03-06 VITALS
HEART RATE: 80 BPM | RESPIRATION RATE: 18 BRPM | DIASTOLIC BLOOD PRESSURE: 60 MMHG | HEIGHT: 67 IN | BODY MASS INDEX: 40.02 KG/M2 | SYSTOLIC BLOOD PRESSURE: 124 MMHG | WEIGHT: 255 LBS | OXYGEN SATURATION: 99 % | TEMPERATURE: 96.6 F

## 2024-03-06 DIAGNOSIS — N39.0 COMPLICATED UTI (URINARY TRACT INFECTION): Primary | ICD-10-CM

## 2024-03-06 DIAGNOSIS — F11.20 CONTINUOUS OPIOID DEPENDENCE (HCC): ICD-10-CM

## 2024-03-06 DIAGNOSIS — G89.4 CHRONIC PAIN SYNDROME: ICD-10-CM

## 2024-03-06 PROBLEM — R07.9 CHEST PAIN: Status: RESOLVED | Noted: 2022-06-22 | Resolved: 2024-03-06

## 2024-03-06 LAB
SL AMB  POCT GLUCOSE, UA: ABNORMAL
SL AMB LEUKOCYTE ESTERASE,UA: ABNORMAL
SL AMB POCT BILIRUBIN,UA: ABNORMAL
SL AMB POCT BLOOD,UA: ABNORMAL
SL AMB POCT CLARITY,UA: ABNORMAL
SL AMB POCT COLOR,UA: YELLOW
SL AMB POCT KETONES,UA: ABNORMAL
SL AMB POCT NITRITE,UA: ABNORMAL
SL AMB POCT PH,UA: 6
SL AMB POCT SPECIFIC GRAVITY,UA: 1.02
SL AMB POCT URINE PROTEIN: ABNORMAL
SL AMB POCT UROBILINOGEN: ABNORMAL

## 2024-03-06 PROCEDURE — G2211 COMPLEX E/M VISIT ADD ON: HCPCS | Performed by: FAMILY MEDICINE

## 2024-03-06 PROCEDURE — 87077 CULTURE AEROBIC IDENTIFY: CPT | Performed by: FAMILY MEDICINE

## 2024-03-06 PROCEDURE — 99214 OFFICE O/P EST MOD 30 MIN: CPT | Performed by: FAMILY MEDICINE

## 2024-03-06 PROCEDURE — 87086 URINE CULTURE/COLONY COUNT: CPT | Performed by: FAMILY MEDICINE

## 2024-03-06 PROCEDURE — 81003 URINALYSIS AUTO W/O SCOPE: CPT | Performed by: FAMILY MEDICINE

## 2024-03-06 PROCEDURE — 87186 SC STD MICRODIL/AGAR DIL: CPT | Performed by: FAMILY MEDICINE

## 2024-03-06 RX ORDER — CEFPODOXIME PROXETIL 200 MG/1
200 TABLET, FILM COATED ORAL DAILY
Qty: 10 TABLET | Refills: 0 | Status: SHIPPED | OUTPATIENT
Start: 2024-03-06 | End: 2024-03-11 | Stop reason: ALTCHOICE

## 2024-03-06 RX ORDER — OXYCODONE HYDROCHLORIDE 5 MG/1
7.5 TABLET ORAL EVERY 8 HOURS PRN
Qty: 120 TABLET | Refills: 0 | Status: SHIPPED | OUTPATIENT
Start: 2024-03-08

## 2024-03-06 NOTE — ASSESSMENT & PLAN NOTE
Treat with cefpodaxime due to high risk for resistant bacteria. Sent culture. Abx renally dosed. Er precautions given.

## 2024-03-06 NOTE — PROGRESS NOTES
I received a call from the patient stating she believes she has a UTI . The patient reports symptoms of urgency and burning.  She notes the urine in her bag is cloudy with a foul odor.  Note sent to clerical to schedule; appointment scheduled for 3pm today.      I also reminded the patient of her PCP follow up appointment on 3/11; she was aware and plans on attending.

## 2024-03-06 NOTE — TELEPHONE ENCOUNTER
Discussed case with Dr. SABINO Bennett    Treating UTI    Hx of enterococcus and Providencia UTI    Plan will be to cover more likely pathogen with oral 3rd gen cephalasporin, culture urine, and if enterococcus is present can add another agent at that time    Reviewed recommended hemodialysis dosing for cefpodoxime    Counseled pt briefly on medication and what to expect    Pharmacist Tracking Tool  Reason For Outreach: Embedded Pharmacist  Demographics:  Intervention Method: In Person  Type of Intervention: Follow-Up  Topics Addressed: Infectious Disease   Pharmacologic Interventions: Medication Initiation  Non-Pharmacologic Interventions: Care coordination and Medication/Device education  Time:  Direct Patient Care:  5  mins  Care Coordination:  5  mins  Recommendation Recipient: Patient/Caregiver and Provider  Outcome: Accepted     Luther Pena, PharmD, BCACP  Ambulatory Care Clinical Pharmacist

## 2024-03-06 NOTE — PROGRESS NOTES
Assessment/Plan:    No problem-specific Assessment & Plan notes found for this encounter.       {Assess/PlanSmartLinks:59498}      Subjective: possible uti     Patient ID: Dee Dee Shaffer is a 63 y.o. female with a ho controlled IDDM2, MDD, HFrEF, CAD, neurogenic bladder s/p suprapubic catheter, ESRD on HD T-TH-S.     HPI  Here for foul smelling, cloudy urine. Pt has a suprapubic cath for a neurogenic bladder.        {Common ambulatory SmartLinks:32138}    Review of Systems      PHQ-2/9 Depression Screening         [unfilled]    Objective:      There were no vitals taken for this visit.         Physical Exam

## 2024-03-06 NOTE — PROGRESS NOTES
Assessment/Plan:    Complicated UTI (urinary tract infection)  Treat with cefpodaxime due to high risk for resistant bacteria. Sent culture. Abx renally dosed. Er precautions given.          Diagnoses and all orders for this visit:    Complicated UTI (urinary tract infection)  -     cefpodoxime (VANTIN) 200 mg tablet; Take 1 tablet (200 mg total) by mouth in the morning for 10 days    Continuous opioid dependence (HCC)  -     oxyCODONE (ROXICODONE) 5 immediate release tablet; Take 1.5 tablets (7.5 mg total) by mouth every 8 (eight) hours as needed for severe pain 30 days supply Max Daily Amount: 22.5 mg Do not start before March 8, 2024.    Chronic pain syndrome  -     oxyCODONE (ROXICODONE) 5 immediate release tablet; Take 1.5 tablets (7.5 mg total) by mouth every 8 (eight) hours as needed for severe pain 30 days supply Max Daily Amount: 22.5 mg Do not start before March 8, 2024.          Subjective:      Patient ID: Dee Dee Shaffer is a 63 y.o. female with a ho IDDM2, neurogenic bladder s/p suprapubic catheter, ESRF on HD tues-Thursday- sat, frequent UTIs    HPI  Here for urinary symptoms including urinary hesitancy, frequency, dysuria, cloudy and foul smelling urine. She is also feeling fatigued. No fevers, flank pain, abd/suprapubic pain. She usually doesn't void however occasionally does when she has a UTI.        The following portions of the patient's history were reviewed and updated as appropriate: allergies, current medications, past family history, past medical history, past social history, past surgical history, and problem list.    Review of Systems   Constitutional:  Positive for fatigue. Negative for fever and unexpected weight change.   HENT:  Negative for ear pain, sore throat and trouble swallowing.    Eyes:  Negative for pain and visual disturbance.   Respiratory:  Negative for cough, chest tightness, shortness of breath and wheezing.    Cardiovascular:  Negative for chest pain.  "  Gastrointestinal:  Negative for abdominal distention, abdominal pain, blood in stool, constipation, diarrhea, nausea and vomiting.   Endocrine: Negative for polydipsia and polyuria.   Genitourinary:  Positive for dysuria, frequency and urgency. Negative for hematuria.   Musculoskeletal:  Negative for back pain and myalgias.   Skin:  Negative for rash.   Neurological:  Negative for syncope and headaches.   Psychiatric/Behavioral:  Negative for suicidal ideas.          PHQ-2/9 Depression Screening             Objective:      /60 (BP Location: Right arm, Patient Position: Sitting, Cuff Size: Standard)   Pulse 80   Temp (!) 96.6 °F (35.9 °C) (Temporal)   Resp 18   Ht 5' 7\" (1.702 m)   Wt 116 kg (255 lb)   SpO2 99%   BMI 39.94 kg/m²          Physical Exam  Constitutional:       Appearance: She is well-developed.   HENT:      Head: Normocephalic and atraumatic.      Right Ear: External ear normal.      Left Ear: External ear normal.      Mouth/Throat:      Pharynx: No oropharyngeal exudate.   Eyes:      General: No scleral icterus.     Extraocular Movements: Extraocular movements intact.      Conjunctiva/sclera: Conjunctivae normal.      Pupils: Pupils are equal, round, and reactive to light.   Cardiovascular:      Rate and Rhythm: Normal rate and regular rhythm.      Heart sounds: No murmur heard.     No friction rub. No gallop.   Pulmonary:      Effort: Pulmonary effort is normal. No respiratory distress.      Breath sounds: Normal breath sounds. No wheezing or rales.   Abdominal:      General: Bowel sounds are normal. There is no distension.      Palpations: Abdomen is soft. There is no mass.      Tenderness: There is no abdominal tenderness. There is no rebound.   Musculoskeletal:         General: No swelling. Normal range of motion.      Cervical back: Normal range of motion and neck supple.      Comments: No cva tenderness   Skin:     General: Skin is warm and dry.   Neurological:      Mental Status: " She is alert and oriented to person, place, and time. Mental status is at baseline.   Psychiatric:         Behavior: Behavior normal.

## 2024-03-07 DIAGNOSIS — E11.22 TYPE 2 DIABETES MELLITUS WITH CHRONIC KIDNEY DISEASE ON CHRONIC DIALYSIS, WITH LONG-TERM CURRENT USE OF INSULIN (HCC): ICD-10-CM

## 2024-03-07 DIAGNOSIS — N18.6 TYPE 2 DIABETES MELLITUS WITH CHRONIC KIDNEY DISEASE ON CHRONIC DIALYSIS, WITH LONG-TERM CURRENT USE OF INSULIN (HCC): ICD-10-CM

## 2024-03-07 DIAGNOSIS — Z99.2 TYPE 2 DIABETES MELLITUS WITH CHRONIC KIDNEY DISEASE ON CHRONIC DIALYSIS, WITH LONG-TERM CURRENT USE OF INSULIN (HCC): ICD-10-CM

## 2024-03-07 DIAGNOSIS — Z79.4 TYPE 2 DIABETES MELLITUS WITH CHRONIC KIDNEY DISEASE ON CHRONIC DIALYSIS, WITH LONG-TERM CURRENT USE OF INSULIN (HCC): ICD-10-CM

## 2024-03-07 RX ORDER — SEMAGLUTIDE 0.68 MG/ML
INJECTION, SOLUTION SUBCUTANEOUS
Qty: 3 ML | Refills: 0 | Status: SHIPPED | OUTPATIENT
Start: 2024-03-07

## 2024-03-08 LAB
BACTERIA UR CULT: ABNORMAL
BACTERIA UR CULT: ABNORMAL

## 2024-03-11 DIAGNOSIS — N30.90 CYSTITIS: Primary | ICD-10-CM

## 2024-03-11 DIAGNOSIS — N39.0 COMPLICATED UTI (URINARY TRACT INFECTION): ICD-10-CM

## 2024-03-11 RX ORDER — CIPROFLOXACIN 500 MG/1
500 TABLET, FILM COATED ORAL EVERY 24 HOURS
Qty: 10 TABLET | Refills: 0 | Status: SHIPPED | OUTPATIENT
Start: 2024-03-11 | End: 2024-03-21

## 2024-03-11 NOTE — PROGRESS NOTES
Spoke with Dee Dee about the culture results. She will stop vantin and switch to cipro which was renally dosed.

## 2024-03-15 ENCOUNTER — PROCEDURE VISIT (OUTPATIENT)
Dept: UROLOGY | Facility: CLINIC | Age: 64
End: 2024-03-15
Payer: COMMERCIAL

## 2024-03-15 VITALS
OXYGEN SATURATION: 97 % | WEIGHT: 252 LBS | RESPIRATION RATE: 20 BRPM | HEIGHT: 67 IN | BODY MASS INDEX: 39.55 KG/M2 | SYSTOLIC BLOOD PRESSURE: 124 MMHG | DIASTOLIC BLOOD PRESSURE: 80 MMHG | HEART RATE: 76 BPM

## 2024-03-15 DIAGNOSIS — N31.9 NEUROGENIC BLADDER: Primary | ICD-10-CM

## 2024-03-15 PROCEDURE — 51705 CHANGE OF BLADDER TUBE: CPT

## 2024-03-15 NOTE — PROGRESS NOTES
"3/15/2024    Dee Dee Shaffer  1960  [id number]    Diagnosis  Chief Complaint    Neurogenic Bladder         Patient presents for SPT change managed by Dr. Tavarez    Plan  Follow up in 6 weeks for APT change    Procedure: Suprapubic Tube Change       Cystostomy tube change     Date/Time  3/15/2024 10:30 AM     Performed by  Iveth Jerry RN   Authorized by  Minor Tavarez MD     [place] Protocol   Consent: Verbal consent obtained.  Consent given by: patient     Procedure Details   Patient tolerance: patient tolerated the procedure well with no immediate complications               Current catheter removed without difficulty after deflation of an intact balloon. Site prepped with Hibiclens, new 16F  silicone suprapubic spt change via aseptic technique without incident, 10 ml balloon inflated with sterile water. irrigated easily for pink-tinged return, mild spasm noted. Patient tolerated well. Attached to drainage bag.     Vitals:    03/15/24 1025   BP: 124/80   Pulse: 76   Resp: 20   SpO2: 97%   Weight: 114 kg (252 lb)   Height: 5' 7\" (1.702 m)         Iveth Jerry RN   "

## 2024-03-22 ENCOUNTER — PATIENT OUTREACH (OUTPATIENT)
Dept: FAMILY MEDICINE CLINIC | Facility: CLINIC | Age: 64
End: 2024-03-22

## 2024-03-22 NOTE — PROGRESS NOTES
I called the patient but received voicemail.  Message was left reminding her to have labs drawn and that I will send a note to clerical to call the patient to reschedule her PCP appointment.

## 2024-03-25 DIAGNOSIS — K21.9 GASTROESOPHAGEAL REFLUX DISEASE WITHOUT ESOPHAGITIS: ICD-10-CM

## 2024-03-25 RX ORDER — PANTOPRAZOLE SODIUM 40 MG/1
40 TABLET, DELAYED RELEASE ORAL DAILY
Qty: 90 TABLET | Refills: 1 | Status: SHIPPED | OUTPATIENT
Start: 2024-03-25

## 2024-03-30 DIAGNOSIS — F33.2 SEVERE EPISODE OF RECURRENT MAJOR DEPRESSIVE DISORDER, WITHOUT PSYCHOTIC FEATURES (HCC): ICD-10-CM

## 2024-03-30 DIAGNOSIS — M10.9 GOUT, UNSPECIFIED CAUSE, UNSPECIFIED CHRONICITY, UNSPECIFIED SITE: ICD-10-CM

## 2024-04-01 DIAGNOSIS — F51.05 INSOMNIA SECONDARY TO ANXIETY: ICD-10-CM

## 2024-04-01 DIAGNOSIS — F41.9 INSOMNIA SECONDARY TO ANXIETY: ICD-10-CM

## 2024-04-01 RX ORDER — ALLOPURINOL 100 MG/1
TABLET ORAL
Qty: 180 TABLET | Refills: 0 | Status: SHIPPED | OUTPATIENT
Start: 2024-04-01

## 2024-04-01 RX ORDER — CITALOPRAM 40 MG/1
TABLET ORAL
Qty: 90 TABLET | Refills: 0 | Status: SHIPPED | OUTPATIENT
Start: 2024-04-01

## 2024-04-01 RX ORDER — OLANZAPINE 5 MG/1
5 TABLET ORAL
Qty: 90 TABLET | Refills: 0 | Status: SHIPPED | OUTPATIENT
Start: 2024-04-01

## 2024-04-03 ENCOUNTER — OFFICE VISIT (OUTPATIENT)
Dept: FAMILY MEDICINE CLINIC | Facility: CLINIC | Age: 64
End: 2024-04-03

## 2024-04-03 VITALS
OXYGEN SATURATION: 95 % | HEART RATE: 76 BPM | TEMPERATURE: 97.3 F | WEIGHT: 247 LBS | BODY MASS INDEX: 38.77 KG/M2 | RESPIRATION RATE: 18 BRPM | DIASTOLIC BLOOD PRESSURE: 61 MMHG | SYSTOLIC BLOOD PRESSURE: 100 MMHG | HEIGHT: 67 IN

## 2024-04-03 DIAGNOSIS — K59.00 CONSTIPATION, UNSPECIFIED CONSTIPATION TYPE: ICD-10-CM

## 2024-04-03 DIAGNOSIS — N18.6 HYPERTENSIVE HEART AND KIDNEY DISEASE WITH HEART FAILURE AND END-STAGE RENAL FAILURE (HCC): ICD-10-CM

## 2024-04-03 DIAGNOSIS — Z79.4 TYPE 2 DIABETES MELLITUS WITH CHRONIC KIDNEY DISEASE ON CHRONIC DIALYSIS, WITH LONG-TERM CURRENT USE OF INSULIN (HCC): Primary | ICD-10-CM

## 2024-04-03 DIAGNOSIS — E11.22 TYPE 2 DIABETES MELLITUS WITH CHRONIC KIDNEY DISEASE ON CHRONIC DIALYSIS, WITH LONG-TERM CURRENT USE OF INSULIN (HCC): Primary | ICD-10-CM

## 2024-04-03 DIAGNOSIS — N18.6 TYPE 2 DIABETES MELLITUS WITH CHRONIC KIDNEY DISEASE ON CHRONIC DIALYSIS, WITH LONG-TERM CURRENT USE OF INSULIN (HCC): Primary | ICD-10-CM

## 2024-04-03 DIAGNOSIS — R30.0 DYSURIA: ICD-10-CM

## 2024-04-03 DIAGNOSIS — Z99.2 TYPE 2 DIABETES MELLITUS WITH CHRONIC KIDNEY DISEASE ON CHRONIC DIALYSIS, WITH LONG-TERM CURRENT USE OF INSULIN (HCC): Primary | ICD-10-CM

## 2024-04-03 DIAGNOSIS — I10 HYPERTENSION, UNSPECIFIED TYPE: ICD-10-CM

## 2024-04-03 DIAGNOSIS — R53.81 PHYSICAL DECONDITIONING: ICD-10-CM

## 2024-04-03 DIAGNOSIS — E66.01 OBESITY, MORBID (HCC): ICD-10-CM

## 2024-04-03 DIAGNOSIS — S61.209D FINGER WOUND, SIMPLE, OPEN, SUBSEQUENT ENCOUNTER: ICD-10-CM

## 2024-04-03 DIAGNOSIS — F11.20 CONTINUOUS OPIOID DEPENDENCE (HCC): ICD-10-CM

## 2024-04-03 DIAGNOSIS — R94.31 PROLONGED QT INTERVAL: ICD-10-CM

## 2024-04-03 DIAGNOSIS — M17.12 PRIMARY OSTEOARTHRITIS OF LEFT KNEE: ICD-10-CM

## 2024-04-03 DIAGNOSIS — J44.9 CHRONIC OBSTRUCTIVE PULMONARY DISEASE, UNSPECIFIED COPD TYPE (HCC): ICD-10-CM

## 2024-04-03 DIAGNOSIS — G89.4 CHRONIC PAIN SYNDROME: ICD-10-CM

## 2024-04-03 DIAGNOSIS — E03.9 ACQUIRED HYPOTHYROIDISM: ICD-10-CM

## 2024-04-03 DIAGNOSIS — I50.42 CHRONIC COMBINED SYSTOLIC AND DIASTOLIC CONGESTIVE HEART FAILURE (HCC): ICD-10-CM

## 2024-04-03 DIAGNOSIS — N39.0 COMPLICATED UTI (URINARY TRACT INFECTION): ICD-10-CM

## 2024-04-03 DIAGNOSIS — N18.6 ESRD (END STAGE RENAL DISEASE) (HCC): ICD-10-CM

## 2024-04-03 DIAGNOSIS — Z79.4 TYPE 2 DIABETES MELLITUS WITH DIABETIC POLYNEUROPATHY, WITH LONG-TERM CURRENT USE OF INSULIN (HCC): ICD-10-CM

## 2024-04-03 DIAGNOSIS — F33.2 SEVERE EPISODE OF RECURRENT MAJOR DEPRESSIVE DISORDER, WITHOUT PSYCHOTIC FEATURES (HCC): ICD-10-CM

## 2024-04-03 DIAGNOSIS — I13.2 HYPERTENSIVE HEART AND KIDNEY DISEASE WITH HEART FAILURE AND END-STAGE RENAL FAILURE (HCC): ICD-10-CM

## 2024-04-03 DIAGNOSIS — E11.42 TYPE 2 DIABETES MELLITUS WITH DIABETIC POLYNEUROPATHY, WITH LONG-TERM CURRENT USE OF INSULIN (HCC): ICD-10-CM

## 2024-04-03 DIAGNOSIS — Z93.59 SUPRAPUBIC CATHETER (HCC): ICD-10-CM

## 2024-04-03 DIAGNOSIS — N32.89 BLADDER SPASMS: ICD-10-CM

## 2024-04-03 PROBLEM — N30.90 CYSTITIS: Status: RESOLVED | Noted: 2023-02-28 | Resolved: 2024-04-03

## 2024-04-03 PROBLEM — I73.89 OTHER SPECIFIED PERIPHERAL VASCULAR DISEASES (HCC): Status: ACTIVE | Noted: 2024-04-03

## 2024-04-03 PROBLEM — R80.8 OTHER PROTEINURIA: Status: RESOLVED | Noted: 2021-09-14 | Resolved: 2024-04-03

## 2024-04-03 LAB — SL AMB POCT HEMOGLOBIN AIC: 5.5 (ref ?–6.5)

## 2024-04-03 PROCEDURE — 99215 OFFICE O/P EST HI 40 MIN: CPT | Performed by: NURSE PRACTITIONER

## 2024-04-03 PROCEDURE — 80365 DRUG SCREENING OXYCODONE: CPT | Performed by: NURSE PRACTITIONER

## 2024-04-03 PROCEDURE — 83036 HEMOGLOBIN GLYCOSYLATED A1C: CPT | Performed by: NURSE PRACTITIONER

## 2024-04-03 PROCEDURE — 80361 OPIATES 1 OR MORE: CPT | Performed by: NURSE PRACTITIONER

## 2024-04-03 PROCEDURE — 80307 DRUG TEST PRSMV CHEM ANLYZR: CPT | Performed by: NURSE PRACTITIONER

## 2024-04-03 PROCEDURE — 87086 URINE CULTURE/COLONY COUNT: CPT | Performed by: NURSE PRACTITIONER

## 2024-04-03 PROCEDURE — 87186 SC STD MICRODIL/AGAR DIL: CPT | Performed by: NURSE PRACTITIONER

## 2024-04-03 PROCEDURE — 87077 CULTURE AEROBIC IDENTIFY: CPT | Performed by: NURSE PRACTITIONER

## 2024-04-03 RX ORDER — AMOXICILLIN 250 MG
1 CAPSULE ORAL DAILY
Qty: 90 TABLET | Refills: 0 | Status: SHIPPED | OUTPATIENT
Start: 2024-04-03

## 2024-04-03 RX ORDER — INSULIN DEGLUDEC 200 U/ML
INJECTION, SOLUTION SUBCUTANEOUS
Start: 2024-04-03

## 2024-04-03 RX ORDER — ACYCLOVIR 400 MG/1
1 TABLET ORAL
Qty: 9 EACH | Refills: 3 | Status: SHIPPED | OUTPATIENT
Start: 2024-04-03

## 2024-04-03 RX ORDER — NALOXONE HYDROCHLORIDE 4 MG/.1ML
SPRAY NASAL
Qty: 1 EACH | Refills: 1 | Status: SHIPPED | OUTPATIENT
Start: 2024-04-03

## 2024-04-03 RX ORDER — OXYCODONE HYDROCHLORIDE 10 MG/1
10 TABLET ORAL EVERY 8 HOURS PRN
Qty: 90 TABLET | Refills: 0 | Status: SHIPPED | OUTPATIENT
Start: 2024-04-03

## 2024-04-03 RX ORDER — INSULIN LISPRO 100 [IU]/ML
INJECTION, SOLUTION INTRAVENOUS; SUBCUTANEOUS
Start: 2024-04-03

## 2024-04-03 NOTE — ASSESSMENT & PLAN NOTE
Dosage increased from 7.5 mg to 10 mg tid PRN to better treat patient's pain   UDS sent today and updated pain contract signed   PDMP reviewed, no red flags

## 2024-04-03 NOTE — ASSESSMENT & PLAN NOTE
Patient was recently treated for UTI, reports sx returning, will send urine for Cx before initiating treatment, ED parameters reviewed

## 2024-04-03 NOTE — PROGRESS NOTES
Name: Dee Dee Shaffer      : 1960      MRN: [id number]  Encounter Provider: CHERELLE Franklin  Encounter Date: 4/3/2024   Encounter department: [facility]    Assessment & Plan     1. Type 2 diabetes mellitus with chronic kidney disease on chronic dialysis, with long-term current use of insulin (HCC)  Assessment & Plan:    Lab Results   Component Value Date    HGBA1C 5.5 2024     Decreasing insulin - Tresiba 35 units, Humalog 15 units at meals   Continue decreasing based on CGM data   Semaglutide increased to 2 mg daily, monitor carefully, any increase in n/v/d or constipation she will need to return to previous dose - discussed this with patient   Follow up in 4 weeks     Orders:  -     POCT hemoglobin A1c  -     insulin degludec (Tresiba FlexTouch) 200 units/mL CONCENTRATED U-200 injection pen; Inject 35 units once daily  -     semaglutide, 1 mg/dose, (Ozempic) 2 mg/1.5 mL injection pen; Inject 0.75 mL (1 mg total) under the skin every 7 days  -     Continuous Blood Gluc Sensor (Dexcom G7 Sensor); Use 1 Device every 10 days  -     Comprehensive metabolic panel; Future    2. Continuous opioid dependence (HCC)  Assessment & Plan:  Dosage increased from 7.5 mg to 10 mg tid PRN to better treat patient's pain   UDS sent today and updated pain contract signed   PDMP reviewed, no red flags     Orders:  -     oxyCODONE (ROXICODONE) 10 MG TABS; Take 1 tablet (10 mg total) by mouth every 8 (eight) hours as needed for severe pain 30 days supply Max Daily Amount: 30 mg  -     Drug Screen Routine w /Conf and Adulteration, urine.; Future  -     Drug Screen Routine w /Conf and Adulteration, urine.    3. Chronic pain syndrome  -     oxyCODONE (ROXICODONE) 10 MG TABS; Take 1 tablet (10 mg total) by mouth every 8 (eight) hours as needed for severe pain 30 days supply Max Daily Amount: 30 mg  -     Drug Screen Routine w /Conf and Adulteration, urine.; Future  -     Drug  Screen Routine w /Conf and Adulteration, urine.    4. Type 2 diabetes mellitus with diabetic polyneuropathy, with long-term current use of insulin ([facility])  -     insulin lispro (HumaLOG) 100 units/mL injection pen; Inject 15 units under skin three times a day before meals    5. Hypertension, unspecified type  -     Comprehensive metabolic panel; Future  -     CBC and differential; Future    6. Obesity, morbid ([facility])  -     TSH, 3rd generation with Free T4 reflex; Future  -     Comprehensive metabolic panel; Future  -     CBC and differential; Future  -     Lipid panel; Future    7. Suprapubic catheter ([facility])  Assessment & Plan:  Managed by urology   Insertion site with erythema, no sx/sx skin infection  She has been in the clinic often for UTI, last treatment 3/11/24   Today will send out a culture for recurring symptoms of suprapubic pain      8. Chronic obstructive pulmonary disease, unspecified COPD type ([facility])  Assessment & Plan:  Stable, does not require medication  May benefit from PFT's for diagnosis purposes if possible      9. Chronic combined systolic and diastolic congestive heart failure ([facility])  Assessment & Plan:  Wt Readings from Last 3 Encounters:   04/03/24 112 kg (247 lb)   03/15/24 114 kg (252 lb)   03/06/24 116 kg (255 lb)     Follow up with cardiology         10. Constipation, unspecified constipation type  Assessment & Plan:  Chronic opioids contributing, discussed with patient importance of regular bowel regimen   She will take lactulose daily and we will follow up on progress in several weeks     Orders:  -     senna-docusate sodium (SENOKOT S) 8.6-50 mg per tablet; Take 1 tablet by mouth daily    11. Physical deconditioning  -     Ambulatory Referral to Physical Therapy; Future    12. Finger wound, simple, open, subsequent encounter  Assessment & Plan:  Patient has non-healing ulcers/ wounds to multiple fingers with erythema   Previously burned her fingers 2/2 neuropathy   Please see physical exam  for pictures  Referral to wound care     Orders:  -     Ambulatory Referral to Wound Care; Future    13. Bladder spasms  -     Urine culture    14. Dysuria  -     Urine culture    15. Primary osteoarthritis of left knee  -     naloxone (NARCAN) 4 mg/0.1 mL nasal spray; Administer 1 spray into a nostril. If breathing does not return to normal or if breathing difficulty resumes after 2-3 minutes, give another dose in the other nostril using a new spray.    16. Severe episode of recurrent major depressive disorder, without psychotic features ([facility])  -     Continuous Blood Gluc Sensor (Dexcom G7 Sensor); Use 1 Device every 10 days    17. Complicated UTI (urinary tract infection)  Assessment & Plan:  Patient was recently treated for UTI, reports sx returning, will send urine for Cx before initiating treatment, ED parameters reviewed       18. 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 Acquired hypothyroidism  Assessment & Plan:  Lab Results   Component Value Date    [id number] 2.364 01/16/2023     Continue levothyroxine 50 mcg daily   Repeat TSH     Orders:  -     TSH, 3rd generation with Free T4 reflex; Future    20. ESRD (end stage renal disease) ([facility])  Assessment & Plan:  Lab Results   Component Value Date    EGFR 14 02/06/2023    EGFR 21 01/17/2023    EGFR 23 01/16/2023    CREATININE 3.28 (H) 02/06/2023    CREATININE 2.38 (H) 01/17/2023    CREATININE 2.25 (H) 01/16/2023     Continue dialysis TTHSA  Continue follow up with nephrology     Orders:  -     Comprehensive metabolic panel; Future  -     PTH, intact; Future  -     Vitamin D 25 hydroxy; Future    21. Prolonged QT interval  Assessment & Plan:  Plan to taper off of  Celexa at next visit         BMI Counseling: Body mass index is 38.69 kg/m². The BMI is above normal. Nutrition recommendations include decreasing portion sizes, encouraging healthy choices of fruits and vegetables, decreasing fast food intake, consuming healthier snacks and limiting drinks that contain sugar. Exercise recommendations include exercising 3-5 times per week. Rationale for BMI follow-up plan is due to patient being overweight or obese.         Kory Shaffer is a 63 y.o. female who  has a past medical history of Abnormal liver function, Anemia, Anxiety, Arthritis, Chronic kidney disease, Chronic narcotic dependence (HCC), Chronic pain disorder, Coronary artery disease, Depression, Diabetes mellitus (HCC), Elevated troponin, GERD (gastroesophageal reflux disease), Heart murmur, Hyperlipidemia, Hypertension, Neurogenic bladder, Open toe wound, Renal disorder, Shortness of breath, Sleep apnea, and Suprapubic catheter (HCC). who presented to the office today for follow up.     The following portions of the patient's history were reviewed and updated as appropriate: allergies, current medications, past family history, past medical history, past social history, past surgical history and problem list.        Review of Systems   Constitutional:  Negative for chills and fever.   HENT:  Negative for ear pain and sore throat.    Eyes:  Negative for pain and visual disturbance.   Respiratory:  Negative for cough and shortness of breath.    Cardiovascular:  Negative for chest pain and palpitations.   Gastrointestinal:  Positive for abdominal pain. Negative for vomiting.   Genitourinary:  Positive for dysuria.   Musculoskeletal:  Positive for arthralgias, gait problem and myalgias. Negative for back pain.   Skin:  Negative for color change and rash.   Neurological:  Negative for seizures and syncope.   All other systems reviewed and are negative.      Past Medical History:   Diagnosis Date     Abnormal liver function     Anemia     Anxiety     Arthritis     CHF (congestive heart failure) (HCC)     Chronic kidney disease     stage 3    Chronic narcotic dependence (HCC)     Chronic pain disorder     lower back, hands , neck and knees    Coronary artery disease     Depression     Diabetes mellitus (HCC)     Elevated troponin 02/11/2022    GERD (gastroesophageal reflux disease)     no meds at present    Heart murmur     murmur    Hyperlipidemia     Hypertension     Neurogenic bladder     Open toe wound 12/2020    right big toe open calus but is dry at present    PONV (postoperative nausea and vomiting)     Renal disorder     Shortness of breath     exertion    Skin ulcer of hand, limited to breakdown of skin (HCC) 02/25/2021    Sleep apnea     doesn't use cpap    Suprapubic catheter (HCC)     Urinary retention      Past Surgical History:   Procedure Laterality Date    AMPUTATION Left     2nd toe    ANGIOPLASTY  2017    5    BREAST EXCISIONAL BIOPSY Left     BREAST SURGERY      CARDIAC CATHETERIZATION      CARDIAC CATHETERIZATION N/A 07/01/2022    Procedure: Cardiac catheterization;  Surgeon: Minh Franz MD;  Location: [facility];  Service: Cardiology    CARDIAC CATHETERIZATION N/A 07/01/2022    Procedure: Cardiac pci;  Surgeon: Minh Franz MD;  Location: [facility];  Service: Cardiology    CARPAL TUNNEL RELEASE Bilateral     CERVICAL FUSION      HYSTERECTOMY  2008    IR AV FISTULAGRAM/GRAFTOGRAM  04/26/2023    IR AV FISTULAGRAM/GRAFTOGRAM  2/28/2024    IR SUPRAPUBIC CATHETER PLACEMENT  06/15/2021    IR SUPRAPUBIC CATHETER PLACEMENT  02/14/2023    IR TUNNELED CENTRAL LINE PLACEMENT  04/04/2023    IR TUNNELED DIALYSIS CATHETER PLACEMENT  07/15/2022    IR TUNNELED DIALYSIS CATHETER PLACEMENT  12/12/2022    IR TUNNELED DIALYSIS CATHETER REMOVAL  8/29/2023    KNEE SURGERY      OOPHORECTOMY  2008    VA ARTERIOVENOUS ANASTOMOSIS OPEN DIRECT Left 02/06/2023    Procedure: CREATION FISTULA   ARTERIOVENOUS (AV);  Surgeon: Minna Herbert DO;  Location: [facility];  Service: Vascular    OK EXC B9 LESION MRGN XCP SK TG S/N/H/F/G 3.1-4.0CM N/A 2020    Procedure: EXCISION SEBACEOUS CYST X 5 SCALP;  Surgeon: Minh Benton MD;  Location:  MAIN OR;  Service: General    OK REVJ OPN ARVEN FSTL W/O THRMBC DIAL GRF Left 7/3/2023    Procedure: REVISION AV FISTULA;  Surgeon: Minna Herbert DO;  Location: [facility];  Service: Vascular    TOE AMPUTATION Left     TRIGGER FINGER RELEASE Right     4th Finger     Family History   Problem Relation Age of Onset    Stroke Father     Heart disease Father     No Known Problems Mother     No Known Problems Sister     No Known Problems Daughter     No Known Problems Maternal Grandmother     No Known Problems Maternal Grandfather     No Known Problems Paternal Grandmother     No Known Problems Paternal Grandfather     No Known Problems Maternal Aunt     No Known Problems Maternal Aunt     No Known Problems Maternal Aunt     No Known Problems Maternal Aunt     No Known Problems Maternal Aunt     No Known Problems Maternal Aunt     No Known Problems Paternal Aunt      Social History     Socioeconomic History    Marital status:      Spouse name: None    Number of children: None    Years of education: None    Highest education level: None   Occupational History    None   Tobacco Use    Smoking status: Former     Current packs/day: 0.00     Average packs/day: 1 pack/day for 35.0 years (35.0 ttl pk-yrs)     Types: Cigarettes     Start date:      Quit date:      Years since quittin.2    Smokeless tobacco: Never   Vaping Use    Vaping status: Never Used   Substance and Sexual Activity    Alcohol use: Not Currently    Drug use: Never    Sexual activity: Not Currently   Other Topics Concern    None   Social History Narrative    None     Social Determinants of Health     Financial Resource Strain: Low Risk  (1/3/2024)    Overall Financial Resource Strain  (CARDIA)     Difficulty of Paying Living Expenses: Not hard at all   Food Insecurity: No Food Insecurity (1/3/2024)    Hunger Vital Sign     Worried About Running Out of Food in the Last Year: Never true     Ran Out of Food in the Last Year: Never true   Transportation Needs: No Transportation Needs (1/3/2024)    PRAPARE - Transportation     Lack of Transportation (Medical): No     Lack of Transportation (Non-Medical): No   Physical Activity: Not on file   Stress: Not on file   Social Connections: Not on file   Intimate Partner Violence: Not on file   Housing Stability: Unknown (4/3/2024)    Housing Stability Vital Sign     Unable to Pay for Housing in the Last Year: No     Number of Places Lived in the Last Year: Not on file     Unstable Housing in the Last Year: No     Current Outpatient Medications on File Prior to Visit   Medication Sig    allopurinol (ZYLOPRIM) 100 mg tablet TAKE 2 TABLETS (200 MG) BY MOUTH DAILY    aspirin (Aspirin Low Dose) 81 mg EC tablet TAKE 1 TABLET (81 MG TOTAL) BY MOUTH DAILY    atorvastatin (LIPITOR) 40 mg tablet TAKE 1 TABLET (40 MG TOTAL) BY MOUTH DAILY    Blood Glucose Monitoring Suppl (OneTouch Verio Reflect) w/Device KIT Check blood sugars once daily. Please substitute with appropriate alternative as covered by patient's insurance. Dx: E11.65    calcitriol (ROCALTROL) 0.5 MCG capsule Take 1 capsule (0.5 mcg total) by mouth 3 (three) times a week    citalopram (CeleXA) 40 mg tablet TAKE 1 TABLET BY MOUTH DAILY    clopidogrel (PLAVIX) 75 mg tablet TAKE 1 TABLET (75 MG TOTAL) BY MOUTH DAILY    furosemide (LASIX) 80 mg tablet Take 2 tablets (160 mg total) by mouth daily    glucose blood (OneTouch Verio) test strip Check blood sugars once daily. Please substitute with appropriate alternative as covered by patient's insurance. Dx: E11.65    hydrALAZINE (APRESOLINE) 25 mg tablet Take 1 tablet (25 mg total) by mouth 3 (three) times a day Hold SBP <100    Insulin Pen Needle (BD Pen Needle  Joanne 2nd Gen) 32G X 4 MM MISC INJECT UNDER THE SKIN 4 (FOUR) TIMES A DAY USE AS DIRECTED    isosorbide mononitrate (IMDUR) 30 mg 24 hr tablet TAKE 1 TABLET (30 MG TOTAL) BY MOUTH EVERY EVENING    ketoconazole (NIZORAL) 2 % cream Apply to suprapubic catheter site twice daily. (Patient not taking: Reported on 2/2/2024)    lactulose 20 g/30 mL Take 30 mL (20 g total) by mouth daily as needed (For constipation)    levothyroxine 50 mcg tablet TAKE 1 TABLET (50 MCG TOTAL) BY MOUTH DAILY    losartan (COZAAR) 25 mg tablet Take 1 tablet (25 mg total) by mouth daily    midodrine (PROAMATINE) 5 mg tablet TAKE 1 TABLET (5 MG TOTAL) BY MOUTH AS NEEDED (IF SBP<100 WITH DIALYSIS)    nitroglycerin (NITROSTAT) 0.4 mg SL tablet Place 1 tablet (0.4 mg total) under the tongue every 5 (five) minutes as needed for chest pain (Patient not taking: Reported on 7/31/2023)    nystatin (MYCOSTATIN) powder Apply topically 3 (three) times a day    OLANZapine (ZyPREXA) 5 mg tablet TAKE 1 TABLET (5 MG TOTAL) BY MOUTH DAILY AT BEDTIME    ondansetron (ZOFRAN) 4 mg tablet Take 1 tablet (4 mg total) by mouth every 8 (eight) hours as needed for nausea or vomiting    OneTouch Delica Lancets 33G MISC Check blood sugars once daily. Please substitute with appropriate alternative as covered by patient's insurance. Dx: E11.65    pantoprazole (PROTONIX) 40 mg tablet TAKE 1 TABLET (40 MG TOTAL) BY MOUTH DAILY    sevelamer carbonate (RENVELA) 800 mg tablet Take 2 tablets (1,600 mg total) by mouth 3 (three) times a day with meals    silver sulfadiazine (SILVADENE,SSD) 1 % cream Apply topically daily (Patient not taking: Reported on 3/31/2023)    [DISCONTINUED] oxygen gas Inhale 2 L/min continuous. Indications: Respiratory Failure    [DISCONTINUED] Torsemide 40 MG TABS Take 40 mg by mouth daily     Allergies   Allergen Reactions    Latex Itching    Codeine GI Intolerance     Immunization History   Administered Date(s) Administered    COVID-19 MODERNA VACC 0.5 ML  "IM 04/07/2021, 05/10/2021, 03/01/2022    COVID-19 Pfizer Vac BIVALENT Jose J-sucrose 12 Yr+ IM 11/29/2022, 01/05/2023    INFLUENZA 10/01/2017, 11/11/2019, 11/10/2020, 10/13/2021    Influenza Quadrivalent Preservative Free 3 years and older IM 02/15/2013, 11/11/2019, 10/13/2021    Influenza Split High Dose Preservative Free IM 10/01/2016    Influenza, injectable, quadrivalent, preservative free 0.5 mL 01/03/2024    Influenza, recombinant, quadrivalent,injectable, preservative free 11/10/2020, 10/13/2021    Influenza, seasonal, injectable, preservative free 02/15/2013, 10/13/2021    Pneumococcal Conjugate 13-Valent 12/16/2021    Pneumococcal Polysaccharide PPV23 01/01/2012, 02/15/2013, 12/16/2021    Tuberculin Skin Test-PPD Intradermal 07/20/2022    Unknown 08/17/2022       Objective     /61 (BP Location: Right arm, Patient Position: Sitting, Cuff Size: Large)   Pulse 76   Temp (!) 97.3 °F (36.3 °C) (Temporal)   Resp 18   Ht 5' 7\" (1.702 m)   Wt 112 kg (247 lb)   SpO2 95%   BMI 38.69 kg/m²     Physical Exam  Vitals and nursing note reviewed.   Constitutional:       General: She is not in acute distress.     Appearance: She is well-developed. She is not diaphoretic.   HENT:      Head: Normocephalic.      Right Ear: External ear normal.      Left Ear: External ear normal.   Eyes:      General:         Right eye: No discharge.         Left eye: No discharge.   Cardiovascular:      Rate and Rhythm: Normal rate.      Heart sounds: Murmur heard.   Pulmonary:      Effort: Pulmonary effort is normal. No respiratory distress.      Breath sounds: No wheezing.   Abdominal:      Comments: +suprapubic catheter, erythema around insertion site    Musculoskeletal:         General: Deformity present.      Cervical back: Neck supple.   Skin:     Findings: Wound present.      Comments: Wounds to finger tips of left hand per pictures below    Neurological:      Mental Status: She is alert and oriented to person, place, and " time.      Gait: Gait abnormal.   Psychiatric:         Mood and Affect: Mood normal.               CHERELLE Franklin

## 2024-04-03 NOTE — ASSESSMENT & PLAN NOTE
Lab Results   Component Value Date    HGBA1C 5.5 04/03/2024     Decreasing insulin - Tresiba 35 units, Humalog 15 units at meals   Continue decreasing based on CGM data   Semaglutide increased to 2 mg daily, monitor carefully, any increase in n/v/d or constipation she will need to return to previous dose - discussed this with patient   Follow up in 4 weeks

## 2024-04-04 ENCOUNTER — TELEPHONE (OUTPATIENT)
Dept: FAMILY MEDICINE CLINIC | Facility: CLINIC | Age: 64
End: 2024-04-04

## 2024-04-04 PROBLEM — S61.209D: Status: ACTIVE | Noted: 2024-04-04

## 2024-04-04 PROBLEM — K59.00 CONSTIPATION: Status: ACTIVE | Noted: 2024-04-04

## 2024-04-04 NOTE — ASSESSMENT & PLAN NOTE
BP Readings from Last 3 Encounters:   04/03/24 100/61   03/15/24 124/80   03/06/24 124/60       BP low normal today, typically at goal w/ fluctuations on dialysis days   Continue current regimen, monitor BP

## 2024-04-04 NOTE — ASSESSMENT & PLAN NOTE
Lab Results   Component Value Date    EGFR 14 02/06/2023    EGFR 21 01/17/2023    EGFR 23 01/16/2023    CREATININE 3.28 (H) 02/06/2023    CREATININE 2.38 (H) 01/17/2023    CREATININE 2.25 (H) 01/16/2023     Continue dialysis TTA  Continue follow up with nephrology

## 2024-04-04 NOTE — ASSESSMENT & PLAN NOTE
Managed by urology   Insertion site with erythema, no sx/sx skin infection  She has been in the clinic often for UTI, last treatment 3/11/24   Today will send out a culture for recurring symptoms of suprapubic pain

## 2024-04-04 NOTE — ASSESSMENT & PLAN NOTE
Lab Results   Component Value Date    [id number] 2.364 01/16/2023     Continue levothyroxine 50 mcg daily   Repeat TSH

## 2024-04-04 NOTE — ASSESSMENT & PLAN NOTE
Wt Readings from Last 3 Encounters:   04/03/24 112 kg (247 lb)   03/15/24 114 kg (252 lb)   03/06/24 116 kg (255 lb)     Follow up with cardiology

## 2024-04-04 NOTE — TELEPHONE ENCOUNTER
----- Message from Jonna Pathak sent at 4/4/2024 10:28 AM EDT -----    ----- Message -----  From: CHERELLE Franklin  Sent: 4/4/2024   7:19 AM EDT  To: [facility]erical    Patient is scheduled for follow up, please change appointment to either 40 minute follow up for diabetes or 1 hour AWV - require extra time due to medical complexity

## 2024-04-04 NOTE — ASSESSMENT & PLAN NOTE
Chronic opioids contributing, discussed with patient importance of regular bowel regimen   She will take lactulose daily and we will follow up on progress in several weeks

## 2024-04-04 NOTE — TELEPHONE ENCOUNTER
04/04/24    first attempt to contact patient. no answer    LEFT DETAILED MESSAGE       IF PT CONTACT OFFICE, PLEASE ASSIST PT WITH CHANGING APPT TO 10:40 AM IF STILL AVAILABLE AND CHANGE LENGTH TIME TO 40 MIN PER PCP IN-BASKET MESSAGE     IF NO AVAILABILITY PLEASE [facility]ULIANA APPT

## 2024-04-04 NOTE — ASSESSMENT & PLAN NOTE
Patient has non-healing ulcers/ wounds to multiple fingers with erythema   Previously burned her fingers 2/2 neuropathy   Please see physical exam for pictures  Referral to wound care

## 2024-04-05 ENCOUNTER — PATIENT OUTREACH (OUTPATIENT)
Dept: FAMILY MEDICINE CLINIC | Facility: CLINIC | Age: 64
End: 2024-04-05

## 2024-04-05 DIAGNOSIS — N39.0 COMPLICATED UTI (URINARY TRACT INFECTION): Primary | ICD-10-CM

## 2024-04-05 DIAGNOSIS — Z79.4 TYPE 2 DIABETES MELLITUS WITH CHRONIC KIDNEY DISEASE ON CHRONIC DIALYSIS, WITH LONG-TERM CURRENT USE OF INSULIN (HCC): Primary | ICD-10-CM

## 2024-04-05 DIAGNOSIS — N18.6 TYPE 2 DIABETES MELLITUS WITH CHRONIC KIDNEY DISEASE ON CHRONIC DIALYSIS, WITH LONG-TERM CURRENT USE OF INSULIN (HCC): Primary | ICD-10-CM

## 2024-04-05 DIAGNOSIS — E11.22 TYPE 2 DIABETES MELLITUS WITH CHRONIC KIDNEY DISEASE ON CHRONIC DIALYSIS, WITH LONG-TERM CURRENT USE OF INSULIN (HCC): Primary | ICD-10-CM

## 2024-04-05 DIAGNOSIS — Z99.2 TYPE 2 DIABETES MELLITUS WITH CHRONIC KIDNEY DISEASE ON CHRONIC DIALYSIS, WITH LONG-TERM CURRENT USE OF INSULIN (HCC): Primary | ICD-10-CM

## 2024-04-05 RX ORDER — CIPROFLOXACIN 500 MG/1
500 TABLET, FILM COATED ORAL EVERY 24 HOURS
Qty: 14 TABLET | Refills: 0 | Status: SHIPPED | OUTPATIENT
Start: 2024-04-05 | End: 2024-04-19

## 2024-04-05 NOTE — PROGRESS NOTES
I received a call from the patient stating she is due for her Ozempic injection today but the pharmacy did not receive the order.  Chart reviewed.  PCP ordered Ozempic 4/3 with a confirmation receipt by pharmacy.  PCP notes state Ozempic was to be increased to 2mg; note sent to PCP.    I called the patient's pharmacy who informed me the Ozempic order was cancelled by the office.  I am waiting for PCP response and will contact pharmacy then.    PCP placed new order for Ozempic 2mg.  I called the pharmacy who stated they received the order and the patient will receive it this afternoon.    I called the patient to inform her that her meds will be delivered this afternoon; she was very appreciative.    I will continue to follow.

## 2024-04-06 LAB
6MAM UR QL SCN: NEGATIVE NG/ML
ACCEPTABLE CREAT UR QL: 186 MG/DL
AMPHET UR QL SCN: NEGATIVE NG/ML
BACTERIA UR CULT: ABNORMAL
BACTERIA UR CULT: ABNORMAL
BARBITURATES UR QL SCN: NEGATIVE NG/ML
BENZODIAZ UR QL SCN: NEGATIVE NG/ML
BUPRENORPHINE UR QL CFM: NEGATIVE NG/ML
CANNABINOIDS UR QL SCN: NEGATIVE NG/ML
CARISOPRODOL UR QL: NEGATIVE NG/ML
COCAINE+BZE UR QL SCN: NEGATIVE NG/ML
CODEINE UR QL CFM: NOT DETECTED NG/MG CREAT
DHC UR CFM-MCNC: NOT DETECTED NG/MG CREAT
ETHYL GLUCURONIDE UR QL SCN: NEGATIVE NG/ML
FENTANYL UR QL SCN: NEGATIVE NG/ML
GABAPENTIN SERPLBLD QL SCN: NEGATIVE UG/ML
HYDROCODONE UR QL CFM: NOT DETECTED NG/MG CREAT
HYDROMORPHONE UR QL CFM: NOT DETECTED NG/MG CREAT
METHADONE UR QL SCN: NEGATIVE NG/ML
MORPHINE UR QL CFM: NOT DETECTED NG/MG CREAT
NITRITE UR QL STRIP: NEGATIVE UG/ML
NORCODEINE/CREAT UR CFM: NOT DETECTED NG/MG CREAT
NORHYDROCODONE UR CFM-MCNC: NOT DETECTED NG/MG CREAT
NORMORPHINE: NOT DETECTED NG/MG CREAT
NOROXYCODONE UR CFM-MCNC: 1687 NG/MG CREAT
NOROXYMORPHONE/CREAT UR CFM: 441 NG/MG CREAT
OPIATES UR QL SCN: NEGATIVE NG/ML
OXYCODONE UR QL CFM: 1135 NG/MG CREAT
OXYCODONE+OXYMORPHONE UR QL SCN: ABNORMAL NG/ML
OXYMORPHONE UR QL CFM: 294 NG/MG CREAT
PCP UR QL SCN: NEGATIVE NG/ML
PROPOXYPH UR QL SCN: NEGATIVE NG/ML
SPECIMEN PH ACCEPTABLE UR: 5.6 (ref 4.5–8.9)
TAPENTADOL UR QL SCN: NEGATIVE NG/ML
TRAMADOL UR QL SCN: NEGATIVE NG/ML

## 2024-04-08 ENCOUNTER — PATIENT OUTREACH (OUTPATIENT)
Dept: FAMILY MEDICINE CLINIC | Facility: CLINIC | Age: 64
End: 2024-04-08

## 2024-04-08 ENCOUNTER — APPOINTMENT (OUTPATIENT)
Dept: LAB | Facility: HOSPITAL | Age: 64
End: 2024-04-08
Payer: COMMERCIAL

## 2024-04-08 DIAGNOSIS — N39.0 COMPLICATED UTI (URINARY TRACT INFECTION): Primary | ICD-10-CM

## 2024-04-08 DIAGNOSIS — Z99.2 TYPE 2 DIABETES MELLITUS WITH CHRONIC KIDNEY DISEASE ON CHRONIC DIALYSIS, WITH LONG-TERM CURRENT USE OF INSULIN (HCC): ICD-10-CM

## 2024-04-08 DIAGNOSIS — N18.6 ESRD (END STAGE RENAL DISEASE) (HCC): ICD-10-CM

## 2024-04-08 DIAGNOSIS — Z79.4 TYPE 2 DIABETES MELLITUS WITH CHRONIC KIDNEY DISEASE ON CHRONIC DIALYSIS, WITH LONG-TERM CURRENT USE OF INSULIN (HCC): ICD-10-CM

## 2024-04-08 DIAGNOSIS — E03.9 ACQUIRED HYPOTHYROIDISM: ICD-10-CM

## 2024-04-08 DIAGNOSIS — E66.01 OBESITY, MORBID (HCC): ICD-10-CM

## 2024-04-08 DIAGNOSIS — N18.6 TYPE 2 DIABETES MELLITUS WITH CHRONIC KIDNEY DISEASE ON CHRONIC DIALYSIS, WITH LONG-TERM CURRENT USE OF INSULIN (HCC): ICD-10-CM

## 2024-04-08 DIAGNOSIS — I10 HYPERTENSION, UNSPECIFIED TYPE: ICD-10-CM

## 2024-04-08 DIAGNOSIS — E11.22 TYPE 2 DIABETES MELLITUS WITH CHRONIC KIDNEY DISEASE ON CHRONIC DIALYSIS, WITH LONG-TERM CURRENT USE OF INSULIN (HCC): ICD-10-CM

## 2024-04-08 RX ORDER — AMPICILLIN 500 MG/1
500 CAPSULE ORAL 4 TIMES DAILY
Qty: 28 CAPSULE | Refills: 0 | Status: SHIPPED | OUTPATIENT
Start: 2024-04-08 | End: 2024-04-15

## 2024-04-08 NOTE — PROGRESS NOTES
IB message received from PCP.    I called the patient to inform her PCP needs to switch antibiotics per urine culture results.  PCP stated the dosing of the antibiotic is dependent upon labs which patient needs to have drawn. I informed the patient of this and urged her to get labs drawn today; she will check with her daughter.      I will continue to follow.

## 2024-04-10 ENCOUNTER — APPOINTMENT (OUTPATIENT)
Dept: LAB | Facility: HOSPITAL | Age: 64
End: 2024-04-10
Payer: COMMERCIAL

## 2024-04-10 DIAGNOSIS — L97.509 TYPE 2 DIABETES MELLITUS WITH FOOT ULCER, WITH LONG-TERM CURRENT USE OF INSULIN (HCC): ICD-10-CM

## 2024-04-10 DIAGNOSIS — Z99.2 ESRD (END STAGE RENAL DISEASE) ON DIALYSIS (HCC): ICD-10-CM

## 2024-04-10 DIAGNOSIS — E11.621 TYPE 2 DIABETES MELLITUS WITH FOOT ULCER, WITH LONG-TERM CURRENT USE OF INSULIN (HCC): ICD-10-CM

## 2024-04-10 DIAGNOSIS — Z79.4 TYPE 2 DIABETES MELLITUS WITH FOOT ULCER, WITH LONG-TERM CURRENT USE OF INSULIN (HCC): ICD-10-CM

## 2024-04-10 DIAGNOSIS — N18.6 ESRD (END STAGE RENAL DISEASE) ON DIALYSIS (HCC): ICD-10-CM

## 2024-04-10 DIAGNOSIS — N18.32 STAGE 3B CHRONIC KIDNEY DISEASE (HCC): ICD-10-CM

## 2024-04-10 LAB
25(OH)D3 SERPL-MCNC: <7 NG/ML (ref 30–100)
ALBUMIN SERPL BCP-MCNC: 3.5 G/DL (ref 3.5–5)
ALP SERPL-CCNC: 118 U/L (ref 34–104)
ALT SERPL W P-5'-P-CCNC: 14 U/L (ref 7–52)
ANION GAP SERPL CALCULATED.3IONS-SCNC: 9 MMOL/L (ref 4–13)
AST SERPL W P-5'-P-CCNC: 16 U/L (ref 13–39)
BILIRUB SERPL-MCNC: 0.38 MG/DL (ref 0.2–1)
BUN SERPL-MCNC: 26 MG/DL (ref 5–25)
CALCIUM SERPL-MCNC: 8.5 MG/DL (ref 8.4–10.2)
CHLORIDE SERPL-SCNC: 98 MMOL/L (ref 96–108)
CHOLEST SERPL-MCNC: 100 MG/DL
CO2 SERPL-SCNC: 33 MMOL/L (ref 21–32)
CREAT SERPL-MCNC: 3.51 MG/DL (ref 0.6–1.3)
EST. AVERAGE GLUCOSE BLD GHB EST-MCNC: 117 MG/DL
GFR SERPL CREATININE-BSD FRML MDRD: 13 ML/MIN/1.73SQ M
GLUCOSE P FAST SERPL-MCNC: 130 MG/DL (ref 65–99)
HBA1C MFR BLD: 5.7 %
HDLC SERPL-MCNC: 37 MG/DL
LDLC SERPL CALC-MCNC: 30 MG/DL (ref 0–100)
NONHDLC SERPL-MCNC: 63 MG/DL
POTASSIUM SERPL-SCNC: 3.8 MMOL/L (ref 3.5–5.3)
PROT SERPL-MCNC: 5.8 G/DL (ref 6.4–8.4)
PTH-INTACT SERPL-MCNC: 165.9 PG/ML (ref 12–88)
SODIUM SERPL-SCNC: 140 MMOL/L (ref 135–147)
TRIGL SERPL-MCNC: 164 MG/DL
TSH SERPL DL<=0.05 MIU/L-ACNC: 2.6 UIU/ML (ref 0.45–4.5)

## 2024-04-10 PROCEDURE — 83970 ASSAY OF PARATHORMONE: CPT

## 2024-04-10 PROCEDURE — 83036 HEMOGLOBIN GLYCOSYLATED A1C: CPT

## 2024-04-10 PROCEDURE — 84443 ASSAY THYROID STIM HORMONE: CPT

## 2024-04-10 PROCEDURE — 82306 VITAMIN D 25 HYDROXY: CPT

## 2024-04-10 PROCEDURE — 80053 COMPREHEN METABOLIC PANEL: CPT

## 2024-04-10 PROCEDURE — 36415 COLL VENOUS BLD VENIPUNCTURE: CPT

## 2024-04-10 PROCEDURE — 80061 LIPID PANEL: CPT

## 2024-04-12 DIAGNOSIS — Z79.4 TYPE 2 DIABETES MELLITUS WITH DIABETIC POLYNEUROPATHY, WITH LONG-TERM CURRENT USE OF INSULIN (HCC): ICD-10-CM

## 2024-04-12 DIAGNOSIS — I10 HYPERTENSION, UNSPECIFIED TYPE: ICD-10-CM

## 2024-04-12 DIAGNOSIS — I25.10 CORONARY ARTERY DISEASE INVOLVING NATIVE HEART WITHOUT ANGINA PECTORIS, UNSPECIFIED VESSEL OR LESION TYPE: ICD-10-CM

## 2024-04-12 DIAGNOSIS — I10 PRIMARY HYPERTENSION: ICD-10-CM

## 2024-04-12 DIAGNOSIS — E03.9 HYPOTHYROIDISM, UNSPECIFIED TYPE: ICD-10-CM

## 2024-04-12 DIAGNOSIS — E11.42 TYPE 2 DIABETES MELLITUS WITH DIABETIC POLYNEUROPATHY, WITH LONG-TERM CURRENT USE OF INSULIN (HCC): ICD-10-CM

## 2024-04-12 DIAGNOSIS — Z79.4 CURRENT USE OF INSULIN (HCC): ICD-10-CM

## 2024-04-12 DIAGNOSIS — N18.32 STAGE 3B CHRONIC KIDNEY DISEASE (HCC): ICD-10-CM

## 2024-04-12 RX ORDER — HYDRALAZINE HYDROCHLORIDE 25 MG/1
25 TABLET, FILM COATED ORAL 3 TIMES DAILY
Qty: 90 TABLET | Refills: 0 | Status: SHIPPED | OUTPATIENT
Start: 2024-04-12

## 2024-04-12 RX ORDER — LOSARTAN POTASSIUM 25 MG/1
25 TABLET ORAL DAILY
Qty: 90 TABLET | Refills: 0 | Status: SHIPPED | OUTPATIENT
Start: 2024-04-12

## 2024-04-12 RX ORDER — ISOSORBIDE MONONITRATE 30 MG/1
30 TABLET, EXTENDED RELEASE ORAL EVERY EVENING
Qty: 90 TABLET | Refills: 0 | Status: SHIPPED | OUTPATIENT
Start: 2024-04-12

## 2024-04-12 RX ORDER — ATORVASTATIN CALCIUM 40 MG/1
40 TABLET, FILM COATED ORAL DAILY
Qty: 90 TABLET | Refills: 0 | Status: SHIPPED | OUTPATIENT
Start: 2024-04-12

## 2024-04-12 RX ORDER — LEVOTHYROXINE SODIUM 0.05 MG/1
50 TABLET ORAL DAILY
Qty: 90 TABLET | Refills: 0 | Status: SHIPPED | OUTPATIENT
Start: 2024-04-12

## 2024-04-17 DIAGNOSIS — E55.9 VITAMIN D DEFICIENCY: Primary | ICD-10-CM

## 2024-04-17 RX ORDER — ERGOCALCIFEROL 1.25 MG/1
50000 CAPSULE ORAL 3 TIMES WEEKLY
Qty: 16 CAPSULE | Refills: 0 | Status: SHIPPED | OUTPATIENT
Start: 2024-04-17

## 2024-04-19 ENCOUNTER — PATIENT OUTREACH (OUTPATIENT)
Dept: FAMILY MEDICINE CLINIC | Facility: CLINIC | Age: 64
End: 2024-04-19

## 2024-04-19 DIAGNOSIS — Z79.4 TYPE 2 DIABETES MELLITUS WITH DIABETIC POLYNEUROPATHY, WITH LONG-TERM CURRENT USE OF INSULIN (HCC): Primary | ICD-10-CM

## 2024-04-19 DIAGNOSIS — E11.42 TYPE 2 DIABETES MELLITUS WITH DIABETIC POLYNEUROPATHY, WITH LONG-TERM CURRENT USE OF INSULIN (HCC): ICD-10-CM

## 2024-04-19 DIAGNOSIS — Z79.4 TYPE 2 DIABETES MELLITUS WITH DIABETIC POLYNEUROPATHY, WITH LONG-TERM CURRENT USE OF INSULIN (HCC): ICD-10-CM

## 2024-04-19 DIAGNOSIS — E11.42 TYPE 2 DIABETES MELLITUS WITH DIABETIC POLYNEUROPATHY, WITH LONG-TERM CURRENT USE OF INSULIN (HCC): Primary | ICD-10-CM

## 2024-04-19 DIAGNOSIS — K59.03 DRUG-INDUCED CONSTIPATION: Primary | ICD-10-CM

## 2024-04-19 RX ORDER — PEN NEEDLE, DIABETIC 32 GX 1/4"
NEEDLE, DISPOSABLE MISCELLANEOUS 3 TIMES DAILY
Qty: 200 EACH | Refills: 3 | Status: SHIPPED | OUTPATIENT
Start: 2024-04-19

## 2024-04-19 RX ORDER — DOCUSATE SODIUM 100 MG/1
100 CAPSULE, LIQUID FILLED ORAL 2 TIMES DAILY
Qty: 60 CAPSULE | Refills: 2 | Status: SHIPPED | OUTPATIENT
Start: 2024-04-19

## 2024-04-19 RX ORDER — PEN NEEDLE, DIABETIC 32 GX 1/4"
NEEDLE, DISPOSABLE MISCELLANEOUS
Qty: 200 EACH | Refills: 3 | Status: SHIPPED | OUTPATIENT
Start: 2024-04-19 | End: 2024-04-19 | Stop reason: SDUPTHER

## 2024-04-19 NOTE — PROGRESS NOTES
I received a message on my VM from the patient requesting a refill of her pen needles; order had already been placed.  The patient requests that she receives them today before running out; note sent to PCP.    The patient reports worsening constipation.  She states she had a bowel movement this morning but it was very painful.  She does report occasional blood on the toilet tissue.  The patient states she is currently taking Senokot, Miralax and just started lactulose; note sent to PCP.  I informed her being on chronic oxycodone can cause slower movement of stool in the intestines.  I encouraged her to eat more fresh fruit and veggies and try prunes.  I also encouraged her to walk which she knows she needs to do more of.  I suggested she walk in her apartment a few times every day and when the weather is nicer she is planning on walking outside with her daughter.      The patient noted she may be relocating to [place] next month.  I asked her to be sure she has enough meds prior to leaving to hold her over until she finds a new PCP.    I will continue to follow.

## 2024-04-22 ENCOUNTER — PATIENT OUTREACH (OUTPATIENT)
Dept: FAMILY MEDICINE CLINIC | Facility: CLINIC | Age: 64
End: 2024-04-22

## 2024-04-22 NOTE — PROGRESS NOTES
I called the patient but received voicemail.  Message was left informing her that her PCP placed orders for pen needles and a med for constipation.  I asked her to call back if she had any questions.

## 2024-04-30 ENCOUNTER — PATIENT OUTREACH (OUTPATIENT)
Dept: FAMILY MEDICINE CLINIC | Facility: CLINIC | Age: 64
End: 2024-04-30

## 2024-04-30 NOTE — PROGRESS NOTES
I called the patient to remind her of her PCP appointment tomorrow at 1040; she was aware and plans on attending.  I will continue to follow.

## 2024-05-01 ENCOUNTER — TELEPHONE (OUTPATIENT)
Dept: FAMILY MEDICINE CLINIC | Facility: CLINIC | Age: 64
End: 2024-05-01

## 2024-05-01 PROBLEM — N39.0 COMPLICATED UTI (URINARY TRACT INFECTION): Status: RESOLVED | Noted: 2021-08-23 | Resolved: 2024-05-01

## 2024-05-01 NOTE — TELEPHONE ENCOUNTER
first attempt to contact patient. left message to return my call on answering machine, to reschedule appointment.

## 2024-05-02 ENCOUNTER — PATIENT OUTREACH (OUTPATIENT)
Dept: FAMILY MEDICINE CLINIC | Facility: CLINIC | Age: 64
End: 2024-05-02

## 2024-05-02 DIAGNOSIS — F11.20 CONTINUOUS OPIOID DEPENDENCE (HCC): ICD-10-CM

## 2024-05-02 DIAGNOSIS — G89.4 CHRONIC PAIN SYNDROME: ICD-10-CM

## 2024-05-02 NOTE — PROGRESS NOTES
I received a call from the patient requesting med RF; submitted to PCP.    The patient reports severe headache that started during the night. She notes she was unable to sleep.  She does report a history of migraines; note sent to PCP.  I asked that she rest and stay in a dark room.  I provided ED precautions.    The patient reports her blood sugars and weight have remained stable and she otherwise feels well.    The patient noted she had to cancel her recent PCP appointment due to transportation issues. She is rescheduled for 5/17.    I will continue to follow.

## 2024-05-02 NOTE — PROGRESS NOTES
I called the patient to inform her no migraine meds are appropriate due her to kidney function and suggested she take Tylenol (per PCP note); patient understands.

## 2024-05-03 ENCOUNTER — OFFICE VISIT (OUTPATIENT)
Dept: UROLOGY | Facility: CLINIC | Age: 64
End: 2024-05-03
Payer: COMMERCIAL

## 2024-05-03 VITALS
OXYGEN SATURATION: 98 % | HEIGHT: 67 IN | SYSTOLIC BLOOD PRESSURE: 116 MMHG | HEART RATE: 74 BPM | WEIGHT: 247 LBS | BODY MASS INDEX: 38.77 KG/M2 | DIASTOLIC BLOOD PRESSURE: 76 MMHG

## 2024-05-03 DIAGNOSIS — N31.9 NEUROGENIC BLADDER: Primary | ICD-10-CM

## 2024-05-03 PROCEDURE — 99213 OFFICE O/P EST LOW 20 MIN: CPT

## 2024-05-03 RX ORDER — OXYCODONE HYDROCHLORIDE 10 MG/1
10 TABLET ORAL EVERY 8 HOURS PRN
Qty: 90 TABLET | Refills: 0 | Status: SHIPPED | OUTPATIENT
Start: 2024-05-03

## 2024-05-03 NOTE — PROGRESS NOTES
Office Visit- Urology  Dee Dee Shaffer 1960 MRN: [id number]      Assessment/Discussion/Plan    63 y.o. female managed by     Neurogenic bladder  -Managed by routine exchange of SPT 16 F catheter   -Continue to maintain SPT patient with routine exchanges every 6 weeks  -Patient had a cystoscopy in May 2023 that she did not tolerate well.  Per previous physician documentation only obtain cystoscopy if needed  -Patient denies any gross hematuria  -Obtain ultrasound of the kidneys and bladder as patient has not had recent imaging and since point of last imaging patient did have progression of her chronic kidney disease to end-stage renal disease on hemodialysis.   -Patient does describe what sounds to be bladder spasms.  She defers consideration of anticholinergics due to concern for side effects.  Patient would not be a candidate for trospium or beta 3 agonist due to end-stage renal disease  -Patient may be moving in the next 1 to 2 months.  Follow-up for 1 year will be made in case patient stays in the area or if she decides to commute to office (moving to [place]) but otherwise we will provide referral to urology group as needed patient did describe some    2.  End-stage renal disease on hemodialysis        Chief Complaint:   Dee Dee is a 63 y.o. female presenting to the office for a follow up visit regarding neurogenic bladder        Subjective    Patient is a 63-year-old female with a history of neurogenic bladder managed with routine SPT exchange.  She was last seen in the office by provider in May 2023 for cystoscopy.  Bladder was found to be small capacity with normal mucosa.  She denies any gross hematuria since she was last seen.  She presents to the office today with her daughter.  She denies any problems.  She comes into the office for routine exchange every 6 weeks.  Shedoes have end-stage renal disease on hemodialysis       ROS:   Review of Systems   Constitutional: Negative.  Negative for chills,  fatigue and fever.   HENT: Negative.     Respiratory:  Negative for shortness of breath.    Cardiovascular:  Negative for chest pain.   Gastrointestinal: Negative.  Negative for abdominal pain.   Endocrine: Negative.    Musculoskeletal: Negative.    Skin: Negative.    Neurological: Negative.  Negative for dizziness and light-headedness.   Hematological: Negative.    Psychiatric/Behavioral: Negative.           Past Medical History  Past Medical History:   Diagnosis Date    Abnormal liver function     Anemia     Anxiety     Arthritis     CHF (congestive heart failure) ([facility])     Chronic kidney disease     stage 3    Chronic narcotic dependence ([facility])     Chronic pain disorder     lower back, hands , neck and knees    Coronary artery disease     Depression     Diabetes mellitus ([facility])     Elevated troponin 02/11/2022    GERD (gastroesophageal reflux disease)     no meds at present    Heart murmur     murmur    Hyperlipidemia     Hypertension     Neurogenic bladder     Open toe wound 12/2020    right big toe open calus but is dry at present    PONV (postoperative nausea and vomiting)     Renal disorder     Shortness of breath     exertion    Skin ulcer of hand, limited to breakdown of skin ([facility]) 02/25/2021    Sleep apnea     doesn't use cpap    Suprapubic catheter ([facility])     Urinary retention        Past Surgical History  Past Surgical History:   Procedure Laterality Date    AMPUTATION Left     2nd toe    ANGIOPLASTY  2017    5    BREAST EXCISIONAL BIOPSY Left     BREAST SURGERY      CARDIAC CATHETERIZATION      CARDIAC CATHETERIZATION N/A 07/01/2022    Procedure: Cardiac catheterization;  Surgeon: Minh Franz MD;  Location: [facility];  Service: Cardiology    CARDIAC CATHETERIZATION N/A 07/01/2022    Procedure: Cardiac pci;  Surgeon: Minh Franz MD;  Location: [facility];  Service: Cardiology    CARPAL TUNNEL RELEASE Bilateral     CERVICAL FUSION      HYSTERECTOMY  2008    IR AV FISTULAGRAM/GRAFTOGRAM   04/26/2023    IR AV FISTULAGRAM/GRAFTOGRAM  2/28/2024    IR SUPRAPUBIC CATHETER PLACEMENT  06/15/2021    IR SUPRAPUBIC CATHETER PLACEMENT  02/14/2023    IR TUNNELED CENTRAL LINE PLACEMENT  04/04/2023    IR TUNNELED DIALYSIS CATHETER PLACEMENT  07/15/2022    IR TUNNELED DIALYSIS CATHETER PLACEMENT  12/12/2022    IR TUNNELED DIALYSIS CATHETER REMOVAL  8/29/2023    KNEE SURGERY      OOPHORECTOMY  2008    MT ARTERIOVENOUS ANASTOMOSIS OPEN DIRECT Left 02/06/2023    Procedure: CREATION FISTULA  ARTERIOVENOUS (AV);  Surgeon: Minna Herbert DO;  Location: [facility];  Service: Vascular    MT EXC B9 LESION MRGN XCP SK TG S/N/H/F/G 3.1-4.0CM N/A 12/21/2020    Procedure: EXCISION SEBACEOUS CYST X 5 SCALP;  Surgeon: Minh Benton MD;  Location:  MAIN OR;  Service: General    MT REVJ OPN ARVEN FSTL W/O THRMBC DIAL GRF Left 7/3/2023    Procedure: REVISION AV FISTULA;  Surgeon: Minna Herbert DO;  Location: [facility];  Service: Vascular    TOE AMPUTATION Left     TRIGGER FINGER RELEASE Right     4th Finger       Past Family History  Family History   Problem Relation Age of Onset    Stroke Father     Heart disease Father     No Known Problems Mother     No Known Problems Sister     No Known Problems Daughter     No Known Problems Maternal Grandmother     No Known Problems Maternal Grandfather     No Known Problems Paternal Grandmother     No Known Problems Paternal Grandfather     No Known Problems Maternal Aunt     No Known Problems Maternal Aunt     No Known Problems Maternal Aunt     No Known Problems Maternal Aunt     No Known Problems Maternal Aunt     No Known Problems Maternal Aunt     No Known Problems Paternal Aunt        Past Social history  Social History     Socioeconomic History    Marital status:      Spouse name: Not on file    Number of children: Not on file    Years of education: Not on file    Highest education level: Not on file   Occupational History    Not on file   Tobacco Use    Smoking  status: Former     Current packs/day: 0.00     Average packs/day: 1 pack/day for 35.0 years (35.0 ttl pk-yrs)     Types: Cigarettes     Start date:      Quit date:      Years since quittin.3    Smokeless tobacco: Never   Vaping Use    Vaping status: Never Used   Substance and Sexual Activity    Alcohol use: Not Currently    Drug use: Never    Sexual activity: Not Currently   Other Topics Concern    Not on file   Social History Narrative    Not on file     Social Determinants of Health     Financial Resource Strain: Low Risk  (1/3/2024)    Overall Financial Resource Strain (CARDIA)     Difficulty of Paying Living Expenses: Not hard at all   Food Insecurity: No Food Insecurity (1/3/2024)    Hunger Vital Sign     Worried About Running Out of Food in the Last Year: Never true     Ran Out of Food in the Last Year: Never true   Transportation Needs: No Transportation Needs (1/3/2024)    PRAPARE - Transportation     Lack of Transportation (Medical): No     Lack of Transportation (Non-Medical): No   Physical Activity: Not on file   Stress: Not on file   Social Connections: Not on file   Intimate Partner Violence: Not on file   Housing Stability: Unknown (4/3/2024)    Housing Stability Vital Sign     Unable to Pay for Housing in the Last Year: No     Number of Places Lived in the Last Year: Not on file     Unstable Housing in the Last Year: No       Current Medications  Current Outpatient Medications   Medication Sig Dispense Refill    allopurinol (ZYLOPRIM) 100 mg tablet TAKE 2 TABLETS (200 MG) BY MOUTH DAILY 180 tablet 0    aspirin (Aspirin Low Dose) 81 mg EC tablet TAKE 1 TABLET (81 MG TOTAL) BY MOUTH DAILY 90 tablet 1    atorvastatin (LIPITOR) 40 mg tablet TAKE 1 TABLET (40 MG TOTAL) BY MOUTH DAILY 90 tablet 0    Blood Glucose Monitoring Suppl (OneTouch Verio Reflect) w/Device KIT Check blood sugars once daily. Please substitute with appropriate alternative as covered by patient's insurance. Dx: E11.65 1 kit  0    calcitriol (ROCALTROL) 0.5 MCG capsule Take 1 capsule (0.5 mcg total) by mouth 3 (three) times a week 45 capsule 5    citalopram (CeleXA) 40 mg tablet TAKE 1 TABLET BY MOUTH DAILY 90 tablet 0    clopidogrel (PLAVIX) 75 mg tablet TAKE 1 TABLET (75 MG TOTAL) BY MOUTH DAILY 30 tablet 1    Continuous Blood Gluc Sensor (Dexcom G7 Sensor) Use 1 Device every 10 days 9 each 3    docusate sodium (COLACE) 100 mg capsule Take 1 capsule (100 mg total) by mouth 2 (two) times a day 60 capsule 2    ergocalciferol (VITAMIN D2) 50,000 units Take 1 capsule (50,000 Units total) by mouth 3 (three) times a week 16 capsule 0    furosemide (LASIX) 80 mg tablet Take 2 tablets (160 mg total) by mouth daily 90 tablet 3    glucose blood (OneTouch Verio) test strip Check blood sugars once daily. Please substitute with appropriate alternative as covered by patient's insurance. Dx: E11.65 100 each 3    hydrALAZINE (APRESOLINE) 25 mg tablet TAKE 1 TABLET (25 MG TOTAL) BY MOUTH 3 (THREE) TIMES A DAY HOLD SBP <100 90 tablet 0    insulin degludec (Tresiba FlexTouch) 200 units/mL CONCENTRATED U-200 injection pen Inject 35 units once daily      insulin lispro (HumaLOG) 100 units/mL injection pen Inject 15 units under skin three times a day before meals      Insulin Pen Needle (BD Pen Needle Micro U/F) 32G X 6 MM MISC Inject under the skin 3 (three) times a day 200 each 3    Insulin Pen Needle (BD Pen Needle Joanne 2nd Gen) 32G X 4 MM MISC INJECT UNDER THE SKIN 4 (FOUR) TIMES A DAY USE AS DIRECTED 100 each 1    isosorbide mononitrate (IMDUR) 30 mg 24 hr tablet TAKE 1 TABLET (30 MG TOTAL) BY MOUTH EVERY EVENING 90 tablet 0    ketoconazole (NIZORAL) 2 % cream       lactulose 20 g/30 mL Take 30 mL (20 g total) by mouth daily as needed (For constipation) 946 mL 3    levothyroxine 50 mcg tablet TAKE 1 TABLET (50 MCG TOTAL) BY MOUTH DAILY 90 tablet 0    losartan (COZAAR) 25 mg tablet TAKE 1 TABLET (25 MG TOTAL) BY MOUTH DAILY 90 tablet 0    midodrine  (PROAMATINE) 5 mg tablet TAKE 1 TABLET (5 MG TOTAL) BY MOUTH AS NEEDED (IF SBP<100 WITH DIALYSIS) 90 tablet 2    naloxone (NARCAN) 4 mg/0.1 mL nasal spray Administer 1 spray into a nostril. If breathing does not return to normal or if breathing difficulty resumes after 2-3 minutes, give another dose in the other nostril using a new spray. 1 each 1    nitroglycerin (NITROSTAT) 0.4 mg SL tablet Place 1 tablet (0.4 mg total) under the tongue every 5 (five) minutes as needed for chest pain 25 tablet 1    nystatin (MYCOSTATIN) powder Apply topically 3 (three) times a day 30 g 3    OLANZapine (ZyPREXA) 5 mg tablet TAKE 1 TABLET (5 MG TOTAL) BY MOUTH DAILY AT BEDTIME 90 tablet 0    ondansetron (ZOFRAN) 4 mg tablet Take 1 tablet (4 mg total) by mouth every 8 (eight) hours as needed for nausea or vomiting 12 tablet 0    OneTouch Delica Lancets 33G MISC Check blood sugars once daily. Please substitute with appropriate alternative as covered by patient's insurance. Dx: E11.65 100 each 5    oxyCODONE (ROXICODONE) 10 MG TABS Take 1 tablet (10 mg total) by mouth every 8 (eight) hours as needed for severe pain 30 days supply Max Daily Amount: 30 mg 90 tablet 0    pantoprazole (PROTONIX) 40 mg tablet TAKE 1 TABLET (40 MG TOTAL) BY MOUTH DAILY 90 tablet 1    semaglutide, 2 mg/dose, (Ozempic) 8 mg/ mL injection pen Inject 0.75 mL (2 mg total) under the skin every 7 days 9 mL 1    senna-docusate sodium (SENOKOT S) 8.6-50 mg per tablet Take 1 tablet by mouth daily 90 tablet 0    sevelamer carbonate (RENVELA) 800 mg tablet Take 2 tablets (1,600 mg total) by mouth 3 (three) times a day with meals 540 tablet 4    silver sulfadiazine (SILVADENE,SSD) 1 % cream Apply topically daily 50 g 0     Current Facility-Administered Medications   Medication Dose Route Frequency Provider Last Rate Last Admin    cyanocobalamin injection 1,000 mcg  1,000 mcg Intramuscular Q30 Days CHERELLE Franklin   1,000 mcg at 01/05/23 4055  "      Allergies  Allergies   Allergen Reactions    Latex Itching    Codeine GI Intolerance       OBJECTIVE    Vitals   Vitals:    05/03/24 1001   BP: 116/76   BP Location: Left arm   Patient Position: Sitting   Cuff Size: Large   Pulse: 74   SpO2: 98%   Weight: 112 kg (247 lb)   Height: 5' 7\" (1.702 m)       PVR:    Physical Exam  Constitutional:       General: She is not in acute distress.     Appearance: Normal appearance. She is normal weight. She is not ill-appearing or toxic-appearing.   HENT:      Head: Normocephalic and atraumatic.   Eyes:      Conjunctiva/sclera: Conjunctivae normal.   Cardiovascular:      Rate and Rhythm: Normal rate.   Pulmonary:      Effort: Pulmonary effort is normal. No respiratory distress.   Skin:     General: Skin is warm and dry.   Neurological:      General: No focal deficit present.      Mental Status: She is alert and oriented to person, place, and time.      Cranial Nerves: No cranial nerve deficit.   Psychiatric:         Mood and Affect: Mood normal.         Behavior: Behavior normal.         Thought Content: Thought content normal.          Labs:     Lab Results   Component Value Date    CREATININE 3.51 (H) 04/10/2024      Lab Results   Component Value Date    HGBA1C 5.7 (H) 04/10/2024     Lab Results   Component Value Date    CALCIUM 8.5 04/10/2024    K 3.8 04/10/2024    CO2 33 (H) 04/10/2024    CL 98 04/10/2024    BUN 26 (H) 04/10/2024    CREATININE 3.51 (H) 04/10/2024       I have personally reviewed all pertinent lab results and reviewed with patient    Imaging       Aleksey Reeves PA-C  Date: 5/3/2024 Time: 10:28 AM  [facility]    This note was written using fluency dictation software. Please excuse any resulting minor grammatical errors.      "

## 2024-05-15 ENCOUNTER — PATIENT OUTREACH (OUTPATIENT)
Dept: FAMILY MEDICINE CLINIC | Facility: CLINIC | Age: 64
End: 2024-05-15

## 2024-05-15 NOTE — PROGRESS NOTES
I called the patient to remind her of her ultrasound appointment 5/17 at 1030 and PCP appointment at 3pm; she was happy for the reminder.    The patient notes she will be relocating in the next few weeks.  She will let me know when she has a definitive date.

## 2024-05-17 ENCOUNTER — HOSPITAL ENCOUNTER (OUTPATIENT)
Dept: ULTRASOUND IMAGING | Facility: MEDICAL CENTER | Age: 64
Discharge: HOME | End: 2024-05-17
Payer: COMMERCIAL

## 2024-05-17 DIAGNOSIS — N31.9 NEUROGENIC BLADDER: ICD-10-CM

## 2024-05-17 PROCEDURE — 76775 US EXAM ABDO BACK WALL LIM: CPT

## 2024-05-22 DIAGNOSIS — N18.32 STAGE 3B CHRONIC KIDNEY DISEASE (HCC): ICD-10-CM

## 2024-05-22 DIAGNOSIS — Z79.4 CURRENT USE OF INSULIN (HCC): ICD-10-CM

## 2024-05-22 DIAGNOSIS — Z79.4 TYPE 2 DIABETES MELLITUS WITH DIABETIC POLYNEUROPATHY, WITH LONG-TERM CURRENT USE OF INSULIN (HCC): ICD-10-CM

## 2024-05-22 DIAGNOSIS — E11.42 TYPE 2 DIABETES MELLITUS WITH DIABETIC POLYNEUROPATHY, WITH LONG-TERM CURRENT USE OF INSULIN (HCC): ICD-10-CM

## 2024-05-22 DIAGNOSIS — I25.10 CORONARY ARTERY DISEASE INVOLVING NATIVE HEART WITHOUT ANGINA PECTORIS, UNSPECIFIED VESSEL OR LESION TYPE: ICD-10-CM

## 2024-05-22 DIAGNOSIS — I10 PRIMARY HYPERTENSION: ICD-10-CM

## 2024-05-22 RX ORDER — INSULIN LISPRO 100 [IU]/ML
INJECTION, SOLUTION INTRAVENOUS; SUBCUTANEOUS
Qty: 15 ML | Refills: 1 | Status: SHIPPED | OUTPATIENT
Start: 2024-05-22

## 2024-05-22 RX ORDER — HYDRALAZINE HYDROCHLORIDE 25 MG/1
25 TABLET, FILM COATED ORAL 3 TIMES DAILY
Qty: 90 TABLET | Refills: 0 | Status: SHIPPED | OUTPATIENT
Start: 2024-05-22

## 2024-05-22 RX ORDER — CLOPIDOGREL BISULFATE 75 MG/1
75 TABLET ORAL DAILY
Qty: 30 TABLET | Refills: 0 | Status: SHIPPED | OUTPATIENT
Start: 2024-05-22

## 2024-05-30 ENCOUNTER — TELEPHONE (OUTPATIENT)
Dept: UROLOGY | Facility: AMBULATORY SURGERY CENTER | Age: 64
End: 2024-05-30

## 2024-05-30 NOTE — TELEPHONE ENCOUNTER
Contacted Dee Dee and made her aware of renal US results and plan to follow up next year as scheduled. Patient also aware of next SPT change appointment.

## 2024-05-30 NOTE — TELEPHONE ENCOUNTER
Can you please call Dee Dee and let her know that there is nothing concerning on her most recent ultrasound of her kidneys and bladder.  She should plan to follow-up with Aleksey next year.

## 2024-05-31 ENCOUNTER — TELEPHONE (OUTPATIENT)
Dept: PSYCHIATRY | Facility: CLINIC | Age: 64
End: 2024-05-31

## 2024-06-03 ENCOUNTER — PATIENT OUTREACH (OUTPATIENT)
Dept: FAMILY MEDICINE CLINIC | Facility: CLINIC | Age: 64
End: 2024-06-03

## 2024-06-03 DIAGNOSIS — F11.20 CONTINUOUS OPIOID DEPENDENCE (HCC): ICD-10-CM

## 2024-06-03 DIAGNOSIS — G89.4 CHRONIC PAIN SYNDROME: ICD-10-CM

## 2024-06-03 RX ORDER — OXYCODONE HYDROCHLORIDE 10 MG/1
10 TABLET ORAL EVERY 8 HOURS PRN
Qty: 90 TABLET | Refills: 0 | Status: SHIPPED | OUTPATIENT
Start: 2024-06-03

## 2024-06-03 NOTE — PROGRESS NOTES
I received a call from the patient asking for a med refill; submitted to PCP.  I reminded the patient of her PCP appointment on 6/7 at 3pm; she plans on attending.  I will continue to follow.

## 2024-06-07 ENCOUNTER — OFFICE VISIT (OUTPATIENT)
Dept: FAMILY MEDICINE CLINIC | Facility: CLINIC | Age: 64
End: 2024-06-07

## 2024-06-07 VITALS
BODY MASS INDEX: 37.67 KG/M2 | HEART RATE: 76 BPM | HEIGHT: 67 IN | DIASTOLIC BLOOD PRESSURE: 70 MMHG | WEIGHT: 240 LBS | OXYGEN SATURATION: 99 % | SYSTOLIC BLOOD PRESSURE: 117 MMHG | TEMPERATURE: 97 F | RESPIRATION RATE: 16 BRPM

## 2024-06-07 DIAGNOSIS — Z12.11 COLON CANCER SCREENING: ICD-10-CM

## 2024-06-07 DIAGNOSIS — M54.42 CHRONIC BILATERAL LOW BACK PAIN WITH BILATERAL SCIATICA: ICD-10-CM

## 2024-06-07 DIAGNOSIS — Z12.2 ENCOUNTER FOR SCREENING FOR LUNG CANCER: ICD-10-CM

## 2024-06-07 DIAGNOSIS — E28.39 ESTROGEN DEFICIENCY: ICD-10-CM

## 2024-06-07 DIAGNOSIS — E03.9 ACQUIRED HYPOTHYROIDISM: ICD-10-CM

## 2024-06-07 DIAGNOSIS — S61.209D FINGER WOUND, SIMPLE, OPEN, SUBSEQUENT ENCOUNTER: ICD-10-CM

## 2024-06-07 DIAGNOSIS — K21.9 GASTROESOPHAGEAL REFLUX DISEASE WITHOUT ESOPHAGITIS: ICD-10-CM

## 2024-06-07 DIAGNOSIS — Z23 ENCOUNTER FOR IMMUNIZATION: Primary | ICD-10-CM

## 2024-06-07 DIAGNOSIS — E11.22 TYPE 2 DIABETES MELLITUS WITH CHRONIC KIDNEY DISEASE ON CHRONIC DIALYSIS, WITH LONG-TERM CURRENT USE OF INSULIN (HCC): ICD-10-CM

## 2024-06-07 DIAGNOSIS — G89.29 CHRONIC BILATERAL LOW BACK PAIN WITH BILATERAL SCIATICA: ICD-10-CM

## 2024-06-07 DIAGNOSIS — Z79.4 TYPE 2 DIABETES MELLITUS WITH CHRONIC KIDNEY DISEASE ON CHRONIC DIALYSIS, WITH LONG-TERM CURRENT USE OF INSULIN (HCC): ICD-10-CM

## 2024-06-07 DIAGNOSIS — Z99.2 TYPE 2 DIABETES MELLITUS WITH CHRONIC KIDNEY DISEASE ON CHRONIC DIALYSIS, WITH LONG-TERM CURRENT USE OF INSULIN (HCC): ICD-10-CM

## 2024-06-07 DIAGNOSIS — F11.20 CONTINUOUS OPIOID DEPENDENCE (HCC): ICD-10-CM

## 2024-06-07 DIAGNOSIS — N18.6 HYPERTENSIVE HEART AND KIDNEY DISEASE WITH HEART FAILURE AND END-STAGE RENAL FAILURE (HCC): ICD-10-CM

## 2024-06-07 DIAGNOSIS — Z13.820 OSTEOPOROSIS SCREENING: ICD-10-CM

## 2024-06-07 DIAGNOSIS — N18.6 TYPE 2 DIABETES MELLITUS WITH CHRONIC KIDNEY DISEASE ON CHRONIC DIALYSIS, WITH LONG-TERM CURRENT USE OF INSULIN (HCC): ICD-10-CM

## 2024-06-07 DIAGNOSIS — Z93.59 SUPRAPUBIC CATHETER (HCC): ICD-10-CM

## 2024-06-07 DIAGNOSIS — I50.42 CHRONIC COMBINED SYSTOLIC AND DIASTOLIC CONGESTIVE HEART FAILURE (HCC): ICD-10-CM

## 2024-06-07 DIAGNOSIS — R30.0 DYSURIA: ICD-10-CM

## 2024-06-07 DIAGNOSIS — M54.41 CHRONIC BILATERAL LOW BACK PAIN WITH BILATERAL SCIATICA: ICD-10-CM

## 2024-06-07 DIAGNOSIS — S61.209A OPEN WOUND OF FINGER, INITIAL ENCOUNTER: ICD-10-CM

## 2024-06-07 DIAGNOSIS — F17.210 SMOKING GREATER THAN 20 PACK YEARS: ICD-10-CM

## 2024-06-07 DIAGNOSIS — I13.2 HYPERTENSIVE HEART AND KIDNEY DISEASE WITH HEART FAILURE AND END-STAGE RENAL FAILURE (HCC): ICD-10-CM

## 2024-06-07 DIAGNOSIS — Z12.31 BREAST CANCER SCREENING BY MAMMOGRAM: ICD-10-CM

## 2024-06-07 DIAGNOSIS — N18.6 ESRD (END STAGE RENAL DISEASE) (HCC): ICD-10-CM

## 2024-06-07 PROCEDURE — 99215 OFFICE O/P EST HI 40 MIN: CPT | Performed by: NURSE PRACTITIONER

## 2024-06-07 PROCEDURE — 87077 CULTURE AEROBIC IDENTIFY: CPT | Performed by: NURSE PRACTITIONER

## 2024-06-07 PROCEDURE — 87086 URINE CULTURE/COLONY COUNT: CPT | Performed by: NURSE PRACTITIONER

## 2024-06-07 PROCEDURE — 87186 SC STD MICRODIL/AGAR DIL: CPT | Performed by: NURSE PRACTITIONER

## 2024-06-07 PROCEDURE — G2211 COMPLEX E/M VISIT ADD ON: HCPCS | Performed by: NURSE PRACTITIONER

## 2024-06-07 RX ORDER — CIPROFLOXACIN 500 MG/1
500 TABLET, FILM COATED ORAL EVERY 24 HOURS
Qty: 7 TABLET | Refills: 0 | Status: SHIPPED | OUTPATIENT
Start: 2024-06-07 | End: 2024-06-14

## 2024-06-07 NOTE — ASSESSMENT & PLAN NOTE
Wt Readings from Last 3 Encounters:   06/07/24 109 kg (240 lb)   05/03/24 112 kg (247 lb)   04/03/24 112 kg (247 lb)       No sx/sx hypervolemia on exam today

## 2024-06-07 NOTE — ASSESSMENT & PLAN NOTE
Managed by urology   Insertion site with erythema, no sx/sx skin infection  She has been in the clinic often for UTI, reports sx today   will send out a culture for recurring symptoms of suprapubic pain

## 2024-06-07 NOTE — ASSESSMENT & PLAN NOTE
Lab Results   Component Value Date    [id number] 2.599 04/10/2024     Continue levothyroxine 50 mcg daily   Repeat TSH

## 2024-06-07 NOTE — ASSESSMENT & PLAN NOTE
Lab Results   Component Value Date    EGFR 13 04/10/2024    EGFR 14 02/06/2023    EGFR 21 01/17/2023    CREATININE 3.51 (H) 04/10/2024    CREATININE 3.28 (H) 02/06/2023    CREATININE 2.38 (H) 01/17/2023     Continue dialysis TTA  Continue follow up with nephrology

## 2024-06-07 NOTE — ASSESSMENT & PLAN NOTE
Patient has non-healing ulcers/ wounds to multiple fingers with erythema   Previously burned her fingers 2/2 neuropathy   Please see physical exam for pictures  Previously referred to wound care

## 2024-06-07 NOTE — ASSESSMENT & PLAN NOTE
Wt Readings from Last 3 Encounters:   06/07/24 109 kg (240 lb)   05/03/24 112 kg (247 lb)   04/03/24 112 kg (247 lb)       BP is within goal, continue current regimen

## 2024-06-07 NOTE — PROGRESS NOTES
Ambulatory Visit  Name: Dee Dee Shaffer      : 1960      MRN: [id number]  Encounter Provider: CHERELLE Franklin  Encounter Date: 2024   Encounter department: [facility]    Assessment & Plan   1. Encounter for immunization  -     HEPATITIS B VACCINE ADULT IM  2. Breast cancer screening by mammogram  -     Mammo screening bilateral w 3d & cad; Future  3. Encounter for screening for lung cancer  -     CT lung screening program; Future; Expected date: 2024  4. Colon cancer screening  -     Ambulatory Referral to Gastroenterology; Future  5. Osteoporosis screening  -     DXA bone density spine hip and pelvis; Future; Expected date: 2024  6. Estrogen deficiency  -     DXA bone density spine hip and pelvis; Future; Expected date: 2024  7. Smoking greater than 20 pack years  -     CT lung screening program; Future; Expected date: 2024  8. Dysuria  -     Urine culture  -     ciprofloxacin (CIPRO) 500 mg tablet; Take 1 tablet (500 mg total) by mouth every 24 hours for 7 days  9. 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 Chronic combined systolic and diastolic congestive heart failure (HCC)  Assessment & Plan:  Wt Readings from Last 3 Encounters:   24 109 kg (240 lb)   24 112 kg (247 lb)   24 112 kg (247 lb)       No sx/sx hypervolemia on exam today       11. Gastroesophageal reflux disease without esophagitis  Assessment & Plan:  Stable, continue pantoprazole daily at this time   12. Type 2 diabetes mellitus with chronic kidney disease on chronic dialysis, with long-term current use of insulin (Formerly [facility])  Assessment & Plan:    Lab Results   Component Value Date    HGBA1C 5.7 (H)  04/10/2024     Decreasing insulin - Tresiba 35 units, Humalog 15 units at meals   Continue decreasing based on CGM data   Semaglutide increased to 2 mg daily, monitor carefully, any increase in n/v/d or constipation she will need to return to previous dose - discussed this with patient     13. Acquired hypothyroidism  Assessment & Plan:  Lab Results   Component Value Date    EYE1NFJBHTYM 2.599 04/10/2024     Continue levothyroxine 50 mcg daily   Repeat TSH   14. ESRD (end stage renal disease) (Formerly [facility])  Assessment & Plan:  Lab Results   Component Value Date    EGFR 13 04/10/2024    EGFR 14 02/06/2023    EGFR 21 01/17/2023    CREATININE 3.51 (H) 04/10/2024    CREATININE 3.28 (H) 02/06/2023    CREATININE 2.38 (H) 01/17/2023     Continue dialysis TTHSA  Continue follow up with nephrology   15. Suprapubic catheter (Formerly [facility])  Assessment & Plan:  Managed by urology   Insertion site with erythema, no sx/sx skin infection  She has been in the clinic often for UTI, reports sx today   will send out a culture for recurring symptoms of suprapubic pain  16. Continuous opioid dependence (Formerly [facility])  Assessment & Plan:  Dosage is oxycodone 10 mg tid PRN    PDMP reviewed, no red flags   Discussed acetaminophen use very sparingly, and no NSAIDS  Referral to pain management for possible injections   17. Chronic bilateral low back pain with bilateral sciatica  -     Ambulatory referral to Spine & Pain Management; Future  18. Open wound of finger, initial encounter  19. Finger wound, simple, open, subsequent encounter  Assessment & Plan:  Patient has non-healing ulcers/ wounds to multiple fingers with erythema   Previously burned her fingers 2/2 neuropathy   Please see physical exam for pictures  Previously referred to wound care          History of Present Illness     Dee Dee Shaffer is a 63 y.o. female who  has a past medical history of Abnormal liver function, Anemia, Anxiety, Arthritis, Chronic kidney disease, Chronic narcotic dependence  (HCC), Chronic pain disorder, Coronary artery disease, Depression, Diabetes mellitus (HCC), Elevated troponin, GERD (gastroesophageal reflux disease), Heart murmur, Hyperlipidemia, Hypertension, Neurogenic bladder, Open toe wound, Renal disorder, Shortness of breath, Sleep apnea, and Suprapubic catheter (HCC). who presented to the office today for follow up. She reports that her and her daughter will be moving to [facility] at the end of the summer.     The following portions of the patient's history were reviewed and updated as appropriate: allergies, current medications, past family history, past medical history, past social history, past surgical history and problem list.        Review of Systems   Constitutional:  Negative for chills and fever.   HENT:  Negative for ear pain and sore throat.    Eyes:  Negative for pain and visual disturbance.   Respiratory:  Negative for cough and shortness of breath.    Cardiovascular:  Negative for chest pain and palpitations.   Gastrointestinal:  Positive for abdominal pain. Negative for vomiting.   Genitourinary:  Positive for dysuria.   Musculoskeletal:  Positive for arthralgias, back pain, gait problem and myalgias.   Skin:  Negative for color change and rash.   Neurological:  Negative for seizures and syncope.   All other systems reviewed and are negative.    Past Medical History:   Diagnosis Date    Abnormal liver function     Anemia     Anxiety     Arthritis     CHF (congestive heart failure) (Formerly [facility])     Chronic kidney disease     stage 3    Chronic narcotic dependence (HCC)     Chronic pain disorder     lower back, hands , neck and knees    Coronary artery disease     Depression     Diabetes mellitus (HCC)     Elevated troponin 02/11/2022    GERD (gastroesophageal reflux disease)     no meds at present    Heart murmur     murmur    Hyperlipidemia     Hypertension     Neurogenic bladder     Open toe wound 12/2020    right big toe open calus but is dry at present    PONV  (postoperative nausea and vomiting)     Renal disorder     Shortness of breath     exertion    Skin ulcer of hand, limited to breakdown of skin (HCC) 02/25/2021    Sleep apnea     doesn't use cpap    Suprapubic catheter (HCC)     Urinary retention      Past Surgical History:   Procedure Laterality Date    AMPUTATION Left     2nd toe    ANGIOPLASTY  2017    5    BREAST EXCISIONAL BIOPSY Left     BREAST SURGERY      CARDIAC CATHETERIZATION      CARDIAC CATHETERIZATION N/A 07/01/2022    Procedure: Cardiac catheterization;  Surgeon: Minh Franz MD;  Location: [facility];  Service: Cardiology    CARDIAC CATHETERIZATION N/A 07/01/2022    Procedure: Cardiac pci;  Surgeon: Minh Franz MD;  Location: [facility];  Service: Cardiology    CARPAL TUNNEL RELEASE Bilateral     CERVICAL FUSION      HYSTERECTOMY  2008    IR AV FISTULAGRAM/GRAFTOGRAM  04/26/2023    IR AV FISTULAGRAM/GRAFTOGRAM  2/28/2024    IR SUPRAPUBIC CATHETER PLACEMENT  06/15/2021    IR SUPRAPUBIC CATHETER PLACEMENT  02/14/2023    IR TUNNELED CENTRAL LINE PLACEMENT  04/04/2023    IR TUNNELED DIALYSIS CATHETER PLACEMENT  07/15/2022    IR TUNNELED DIALYSIS CATHETER PLACEMENT  12/12/2022    IR TUNNELED DIALYSIS CATHETER REMOVAL  8/29/2023    KNEE SURGERY      OOPHORECTOMY  2008    HI ARTERIOVENOUS ANASTOMOSIS OPEN DIRECT Left 02/06/2023    Procedure: CREATION FISTULA  ARTERIOVENOUS (AV);  Surgeon: Minna Herbert DO;  Location: [facility];  Service: Vascular    HI EXC B9 LESION MRGN XCP SK TG S/N/H/F/G 3.1-4.0CM N/A 12/21/2020    Procedure: EXCISION SEBACEOUS CYST X 5 SCALP;  Surgeon: Minh Benton MD;  Location:  MAIN OR;  Service: General    HI REVJ OPN ARVEN FSTL W/O THRMBC DIAL GRF Left 7/3/2023    Procedure: REVISION AV FISTULA;  Surgeon: Minna Herbert DO;  Location: [facility];  Service: Vascular    TOE AMPUTATION Left     TRIGGER FINGER RELEASE Right     4th Finger     Family History   Problem Relation Age of Onset    Stroke  Father     Heart disease Father     No Known Problems Mother     No Known Problems Sister     No Known Problems Daughter     No Known Problems Maternal Grandmother     No Known Problems Maternal Grandfather     No Known Problems Paternal Grandmother     No Known Problems Paternal Grandfather     No Known Problems Maternal Aunt     No Known Problems Maternal Aunt     No Known Problems Maternal Aunt     No Known Problems Maternal Aunt     No Known Problems Maternal Aunt     No Known Problems Maternal Aunt     No Known Problems Paternal Aunt      Social History     Tobacco Use    Smoking status: Former     Current packs/day: 0.00     Average packs/day: 1 pack/day for 35.0 years (35.0 ttl pk-yrs)     Types: Cigarettes     Start date:      Quit date:      Years since quittin.4    Smokeless tobacco: Never   Vaping Use    Vaping status: Never Used   Substance and Sexual Activity    Alcohol use: Not Currently    Drug use: Never    Sexual activity: Not Currently     Current Outpatient Medications on File Prior to Visit   Medication Sig    allopurinol (ZYLOPRIM) 100 mg tablet TAKE 2 TABLETS (200 MG) BY MOUTH DAILY    aspirin (Aspirin Low Dose) 81 mg EC tablet TAKE 1 TABLET (81 MG TOTAL) BY MOUTH DAILY    atorvastatin (LIPITOR) 40 mg tablet TAKE 1 TABLET (40 MG TOTAL) BY MOUTH DAILY    Blood Glucose Monitoring Suppl (OneTouch Verio Reflect) w/Device KIT Check blood sugars once daily. Please substitute with appropriate alternative as covered by patient's insurance. Dx: E11.65    calcitriol (ROCALTROL) 0.5 MCG capsule Take 1 capsule (0.5 mcg total) by mouth 3 (three) times a week    citalopram (CeleXA) 40 mg tablet TAKE 1 TABLET BY MOUTH DAILY    clopidogrel (PLAVIX) 75 mg tablet TAKE 1 TABLET (75 MG TOTAL) BY MOUTH DAILY    Continuous Blood Gluc Sensor (Dexcom G7 Sensor) Use 1 Device every 10 days    docusate sodium (COLACE) 100 mg capsule Take 1 capsule (100 mg total) by mouth 2 (two) times a day    ergocalciferol  (VITAMIN D2) 50,000 units Take 1 capsule (50,000 Units total) by mouth 3 (three) times a week    furosemide (LASIX) 80 mg tablet Take 2 tablets (160 mg total) by mouth daily    glucose blood (OneTouch Verio) test strip Check blood sugars once daily. Please substitute with appropriate alternative as covered by patient's insurance. Dx: E11.65    hydrALAZINE (APRESOLINE) 25 mg tablet TAKE 1 TABLET (25 MG TOTAL) BY MOUTH 3 (THREE) TIMES A DAY HOLD SBP <100    insulin degludec (Tresiba FlexTouch) 200 units/mL CONCENTRATED U-200 injection pen Inject 35 units once daily    insulin lispro (HumaLOG) 100 units/mL injection pen INJECT 20 UNITS UNDER SKIN THREE TIMES A DAY BEFORE MEALS    Insulin Pen Needle (BD Pen Needle Micro U/F) 32G X 6 MM MISC Inject under the skin 3 (three) times a day    Insulin Pen Needle (BD Pen Needle Joanne 2nd Gen) 32G X 4 MM MISC INJECT UNDER THE SKIN 4 (FOUR) TIMES A DAY USE AS DIRECTED    isosorbide mononitrate (IMDUR) 30 mg 24 hr tablet TAKE 1 TABLET (30 MG TOTAL) BY MOUTH EVERY EVENING    ketoconazole (NIZORAL) 2 % cream     lactulose 20 g/30 mL Take 30 mL (20 g total) by mouth daily as needed (For constipation)    levothyroxine 50 mcg tablet TAKE 1 TABLET (50 MCG TOTAL) BY MOUTH DAILY    losartan (COZAAR) 25 mg tablet TAKE 1 TABLET (25 MG TOTAL) BY MOUTH DAILY    midodrine (PROAMATINE) 5 mg tablet TAKE 1 TABLET (5 MG TOTAL) BY MOUTH AS NEEDED (IF SBP<100 WITH DIALYSIS)    naloxone (NARCAN) 4 mg/0.1 mL nasal spray Administer 1 spray into a nostril. If breathing does not return to normal or if breathing difficulty resumes after 2-3 minutes, give another dose in the other nostril using a new spray.    nitroglycerin (NITROSTAT) 0.4 mg SL tablet Place 1 tablet (0.4 mg total) under the tongue every 5 (five) minutes as needed for chest pain    nystatin (MYCOSTATIN) powder Apply topically 3 (three) times a day    OLANZapine (ZyPREXA) 5 mg tablet TAKE 1 TABLET (5 MG TOTAL) BY MOUTH DAILY AT BEDTIME     ondansetron (ZOFRAN) 4 mg tablet Take 1 tablet (4 mg total) by mouth every 8 (eight) hours as needed for nausea or vomiting    OneTouch Delica Lancets 33G MISC Check blood sugars once daily. Please substitute with appropriate alternative as covered by patient's insurance. Dx: E11.65    oxyCODONE (ROXICODONE) 10 MG TABS Take 1 tablet (10 mg total) by mouth every 8 (eight) hours as needed for severe pain 30 days supply Max Daily Amount: 30 mg    pantoprazole (PROTONIX) 40 mg tablet TAKE 1 TABLET (40 MG TOTAL) BY MOUTH DAILY    semaglutide, 2 mg/dose, (Ozempic) 8 mg/ mL injection pen Inject 0.75 mL (2 mg total) under the skin every 7 days    senna-docusate sodium (SENOKOT S) 8.6-50 mg per tablet Take 1 tablet by mouth daily    sevelamer carbonate (RENVELA) 800 mg tablet Take 2 tablets (1,600 mg total) by mouth 3 (three) times a day with meals    silver sulfadiazine (SILVADENE,SSD) 1 % cream Apply topically daily    [DISCONTINUED] oxygen gas Inhale 2 L/min continuous. Indications: Respiratory Failure    [DISCONTINUED] Torsemide 40 MG TABS Take 40 mg by mouth daily     Allergies   Allergen Reactions    Latex Itching    Codeine GI Intolerance     Immunization History   Administered Date(s) Administered    COVID-19 MODERNA VACC 0.5 ML IM 04/07/2021, 05/10/2021, 03/01/2022    COVID-19 Pfizer Vac BIVALENT Jose J-sucrose 12 Yr+ IM 11/29/2022, 01/05/2023    Hep B, adult 08/17/2022    INFLUENZA 10/01/2017, 11/11/2019, 11/10/2020, 10/13/2021    Influenza Quadrivalent Preservative Free 3 years and older IM 02/15/2013, 11/11/2019, 10/13/2021    Influenza Split High Dose Preservative Free IM 10/01/2016    Influenza, injectable, quadrivalent, preservative free 0.5 mL 01/03/2024    Influenza, recombinant, quadrivalent,injectable, preservative free 11/10/2020, 10/13/2021    Influenza, seasonal, injectable, preservative free 02/15/2013, 10/13/2021    Pneumococcal Conjugate 13-Valent 12/16/2021    Pneumococcal Polysaccharide PPV23  "01/01/2012, 02/15/2013, 12/16/2021    Tuberculin Skin Test-PPD Intradermal 07/20/2022    Unknown 08/17/2022    Zoster Vaccine Recombinant 03/01/2022     Objective     /70 (BP Location: Right arm, Patient Position: Sitting, Cuff Size: Large)   Pulse 76   Temp (!) 97 °F (36.1 °C) (Temporal)   Resp 16   Ht 5' 7\" (1.702 m)   Wt 109 kg (240 lb)   SpO2 99%   BMI 37.59 kg/m²     Physical Exam  Vitals and nursing note reviewed.   Constitutional:       General: She is not in acute distress.     Appearance: She is well-developed.   HENT:      Head: Normocephalic and atraumatic.      Right Ear: External ear normal.      Left Ear: External ear normal.   Eyes:      General:         Right eye: No discharge.         Left eye: No discharge.      Conjunctiva/sclera: Conjunctivae normal.   Cardiovascular:      Rate and Rhythm: Normal rate and regular rhythm.   Pulmonary:      Effort: Pulmonary effort is normal. No respiratory distress.      Breath sounds: Normal breath sounds.   Musculoskeletal:         General: Deformity present.      Cervical back: Neck supple.      Right lower leg: Edema present.      Left lower leg: Edema present.      Comments: Deformity of third digit on both hands   Left third digit with small open area at tip      Skin:     General: Skin is warm and dry.   Neurological:      Mental Status: She is alert and oriented to person, place, and time.   Psychiatric:         Behavior: Behavior normal.                   Administrative Statements   I have spent a total time of 60 minutes on 06/07/24 In caring for this patient including Prognosis, Risks and benefits of tx options, Instructions for management, Patient and family education, Importance of tx compliance, Risk factor reductions, Impressions, Counseling / Coordination of care, Documenting in the medical record, Reviewing / ordering tests, medicine, procedures  , Obtaining or reviewing history  , and Communicating with other healthcare professionals " .

## 2024-06-07 NOTE — ASSESSMENT & PLAN NOTE
Dosage is oxycodone 10 mg tid PRN    PDMP reviewed, no red flags   Discussed acetaminophen use very sparingly, and no NSAIDS  Referral to pain management for possible injections

## 2024-06-07 NOTE — ASSESSMENT & PLAN NOTE
Lab Results   Component Value Date    HGBA1C 5.7 (H) 04/10/2024     Decreasing insulin - Tresiba 35 units, Humalog 15 units at meals   Continue decreasing based on CGM data   Semaglutide increased to 2 mg daily, monitor carefully, any increase in n/v/d or constipation she will need to return to previous dose - discussed this with patient

## 2024-06-10 LAB
BACTERIA UR CULT: ABNORMAL

## 2024-06-21 ENCOUNTER — TELEPHONE (OUTPATIENT)
Age: 64
End: 2024-06-21

## 2024-06-21 ENCOUNTER — PROCEDURE VISIT (OUTPATIENT)
Dept: UROLOGY | Facility: CLINIC | Age: 64
End: 2024-06-21
Payer: COMMERCIAL

## 2024-06-21 ENCOUNTER — VBI (OUTPATIENT)
Dept: ADMINISTRATIVE | Facility: OTHER | Age: 64
End: 2024-06-21

## 2024-06-21 VITALS
HEART RATE: 76 BPM | SYSTOLIC BLOOD PRESSURE: 170 MMHG | WEIGHT: 245 LBS | HEIGHT: 67 IN | RESPIRATION RATE: 20 BRPM | OXYGEN SATURATION: 96 % | BODY MASS INDEX: 38.45 KG/M2 | DIASTOLIC BLOOD PRESSURE: 70 MMHG

## 2024-06-21 DIAGNOSIS — N31.9 NEUROGENIC BLADDER: Primary | ICD-10-CM

## 2024-06-21 PROCEDURE — 51705 CHANGE OF BLADDER TUBE: CPT

## 2024-06-21 NOTE — TELEPHONE ENCOUNTER
06/21/24 9:10 AM     Chart reviewed for CRC: Colonoscopy ; nothing is submitted to the patient's insurance at this time.     Shira Orellana MA   [facility]

## 2024-06-21 NOTE — PROGRESS NOTES
"6/21/2024    Dee Dee Shaffer  1960  [id number]    Diagnosis  Chief Complaint    Neurogenic Bladder         Patient presents for SPT change managed by Dr. Tavarez    Plan  Follow up in 6 weeks for SPT change    Procedure: Suprapubic Tube Change       Cystostomy tube change     Date/Time  6/21/2024 11:30 AM     Performed by  Iveth Jerry RN   Authorized by  Minor Tavarez MD     [place] Protocol   Consent: Verbal consent obtained.  Consent given by: patient     Procedure Details   Patient tolerance: patient tolerated the procedure well with no immediate complications             Current catheter removed without difficulty after deflation of an intact balloon. Site prepped with Hibiclens, new 16F  silicone  suprapubic spt change via aseptic technique without incident, 10 ml balloon inflated with sterile water. irrigated easily for pink-tinged return, mild spasm noted. Patient tolerated well. Attached to drainage bag.     Vitals:    06/21/24 1126   BP: 170/70   Pulse: 76   Resp: 20   SpO2: 96%   Weight: 111 kg (245 lb)   Height: 5' 7\" (1.702 m)         Iveth Jerry RN   "

## 2024-06-21 NOTE — TELEPHONE ENCOUNTER
Note:  06/21/24  Screened by: Jazmin Alcantara     Referring Provider Madhuri Sewell     Pre- Screening:      Body mass index is 37.59 kg/m².  Has patient been referred for a routine screening Colonoscopy? no  Is the patient between 45-75 years old? no        Previous Colonoscopy no   If yes:    Date:     Facility:     Reason:         SCHEDULING STAFF: If the patient is between 45yrs-49yrs, please advise patient to confirm benefits/coverage with their insurance company for a routine screening colonoscopy, some insurance carriers will only cover at 50yrs or older. If the patient is over 75years old, please schedule an office visit.      Does the patient want to see a Gastroenterologist prior to their procedure OR are they having any GI symptoms? no     Has the patient been hospitalized or had abdominal surgery in the past 6 months? no     Does the patient use supplemental oxygen? no     Does the patient take Coumadin, Lovenox, Plavix, Elliquis, Xarelto, or other blood thinning medication? no     Has the patient had a stroke, cardiac event, or stent placed in the past year? no     SCHEDULING STAFF: If patient answers NO to above questions, then schedule procedure. If patient answers YES to above questions, then schedule office appointment.      If patient is between 45yrs - 49yrs, please advise patient that we will have to confirm benefits & coverage with their insurance company for a routine screening colonoscopy.       ASC Screening    ASC Screening  BMI > than 45: No  Are you currently pregnant?: No  Do you rely on a wheelchair for mobility?: No  Do you need oxygen during the day?: No  Have you ever been informed by anesthesia that you have a difficult airway?: No  Have you been diagnosed with End Stage Renal Disease (ESRD)?: Yes  Are you actively on dialysis?: Yes  Have you been diagnosed with Pulmonary Hypertension?: No  Do you have a pacemaker or an Automatic Implantable Cardioverter Defibrillator (AICD)?: No  Have  you ever had an organ transplant?: Yes  Have you had a stroke, heart attack, myocardial infarction (MI) within the last 6 months?: No  Have you ever been diagnosed with Aortic Stenosis?: No  Have you ever been diagnosed  with Congestive Heart Failure?: Yes  Have you ever been diagnosed with a heart valve disease?: No  Are you Diabetic?: Yes  If you are Diabetic, has your A1C been greater than 12 within the last six months?: No       Appointment Information   Name: Dee Dee Shaffer MRN: [id number]   Date: 9/20/2024 Status: [facility]   Time: 10:30 AM  10:30 AM Length: 30  30   Visit Type: GI COLONOSCOPY [1102] Copay: $0.00   Provider: Diana M Jaiyeola, MD   GI ROOM 04       Bowel prep reviewed with patient:miralax  Instructions reviewed with patient by:yolande tim  Clearances: none

## 2024-06-24 DIAGNOSIS — E83.39 HYPERPHOSPHATEMIA: ICD-10-CM

## 2024-06-24 RX ORDER — SEVELAMER CARBONATE 800 MG/1
1600 TABLET, FILM COATED ORAL
Qty: 540 TABLET | Refills: 3 | Status: SHIPPED | OUTPATIENT
Start: 2024-06-24

## 2024-07-01 ENCOUNTER — PATIENT OUTREACH (OUTPATIENT)
Dept: FAMILY MEDICINE CLINIC | Facility: CLINIC | Age: 64
End: 2024-07-01

## 2024-07-01 DIAGNOSIS — G89.4 CHRONIC PAIN SYNDROME: ICD-10-CM

## 2024-07-01 DIAGNOSIS — F11.20 CONTINUOUS OPIOID DEPENDENCE (HCC): ICD-10-CM

## 2024-07-01 NOTE — PROGRESS NOTES
I called the patient to follow up.  She stated she was napping because she woke up early today at 530am.      The patient notes her dialysis sessions are going well.   She reports her catheter is draining without issues.    I informed the patient she is due for her annual eye exam.  She notes [facility] will not see her any longer since she missed 2 appointments.  The patient is also due for a Podiatry follow up.  She notes she is planning on relocating at the end of August; she will find new physicians once she moves.  I encouraged her to schedule her chest CT prior to moving.      The patient reported her blood sugar during this call was 68. She denied any symptoms.  I kept the call brief so she could eat lunch.    The patient is due for med refill; request sent to PCP.    I will continue to follow.

## 2024-07-03 RX ORDER — OXYCODONE HYDROCHLORIDE 10 MG/1
10 TABLET ORAL EVERY 8 HOURS PRN
Qty: 90 TABLET | Refills: 0 | Status: SHIPPED | OUTPATIENT
Start: 2024-07-03

## 2024-07-05 DIAGNOSIS — I10 PRIMARY HYPERTENSION: ICD-10-CM

## 2024-07-05 DIAGNOSIS — Z79.4 TYPE 2 DIABETES MELLITUS WITH DIABETIC POLYNEUROPATHY, WITH LONG-TERM CURRENT USE OF INSULIN (HCC): ICD-10-CM

## 2024-07-05 DIAGNOSIS — N18.32 STAGE 3B CHRONIC KIDNEY DISEASE (HCC): ICD-10-CM

## 2024-07-05 DIAGNOSIS — I10 HYPERTENSION, UNSPECIFIED TYPE: ICD-10-CM

## 2024-07-05 DIAGNOSIS — E11.42 TYPE 2 DIABETES MELLITUS WITH DIABETIC POLYNEUROPATHY, WITH LONG-TERM CURRENT USE OF INSULIN (HCC): ICD-10-CM

## 2024-07-05 DIAGNOSIS — E03.9 HYPOTHYROIDISM, UNSPECIFIED TYPE: ICD-10-CM

## 2024-07-05 DIAGNOSIS — I25.10 CORONARY ARTERY DISEASE INVOLVING NATIVE HEART WITHOUT ANGINA PECTORIS, UNSPECIFIED VESSEL OR LESION TYPE: ICD-10-CM

## 2024-07-05 DIAGNOSIS — Z79.4 CURRENT USE OF INSULIN (HCC): ICD-10-CM

## 2024-07-06 DIAGNOSIS — F33.2 SEVERE EPISODE OF RECURRENT MAJOR DEPRESSIVE DISORDER, WITHOUT PSYCHOTIC FEATURES (HCC): ICD-10-CM

## 2024-07-06 DIAGNOSIS — E55.9 VITAMIN D DEFICIENCY: ICD-10-CM

## 2024-07-06 DIAGNOSIS — F51.05 INSOMNIA SECONDARY TO ANXIETY: ICD-10-CM

## 2024-07-06 DIAGNOSIS — E11.42 TYPE 2 DIABETES MELLITUS WITH DIABETIC POLYNEUROPATHY, WITH LONG-TERM CURRENT USE OF INSULIN (HCC): ICD-10-CM

## 2024-07-06 DIAGNOSIS — F41.9 INSOMNIA SECONDARY TO ANXIETY: ICD-10-CM

## 2024-07-06 DIAGNOSIS — Z79.4 CURRENT USE OF INSULIN (HCC): ICD-10-CM

## 2024-07-06 DIAGNOSIS — K59.00 CONSTIPATION, UNSPECIFIED CONSTIPATION TYPE: ICD-10-CM

## 2024-07-06 DIAGNOSIS — I10 HYPERTENSION, UNSPECIFIED TYPE: ICD-10-CM

## 2024-07-06 DIAGNOSIS — I10 PRIMARY HYPERTENSION: ICD-10-CM

## 2024-07-06 DIAGNOSIS — M10.9 GOUT, UNSPECIFIED CAUSE, UNSPECIFIED CHRONICITY, UNSPECIFIED SITE: ICD-10-CM

## 2024-07-06 DIAGNOSIS — N18.32 STAGE 3B CHRONIC KIDNEY DISEASE (HCC): ICD-10-CM

## 2024-07-06 DIAGNOSIS — I25.10 CORONARY ARTERY DISEASE INVOLVING NATIVE HEART WITHOUT ANGINA PECTORIS, UNSPECIFIED VESSEL OR LESION TYPE: ICD-10-CM

## 2024-07-06 DIAGNOSIS — Z79.4 TYPE 2 DIABETES MELLITUS WITH DIABETIC POLYNEUROPATHY, WITH LONG-TERM CURRENT USE OF INSULIN (HCC): ICD-10-CM

## 2024-07-06 RX ORDER — LACTULOSE 10 G/15ML
SOLUTION ORAL
Qty: 946 ML | Refills: 2 | Status: SHIPPED | OUTPATIENT
Start: 2024-07-06

## 2024-07-08 RX ORDER — HYDRALAZINE HYDROCHLORIDE 25 MG/1
25 TABLET, FILM COATED ORAL 3 TIMES DAILY
Qty: 90 TABLET | Refills: 0 | Status: SHIPPED | OUTPATIENT
Start: 2024-07-08

## 2024-07-08 RX ORDER — ISOSORBIDE MONONITRATE 30 MG/1
30 TABLET, EXTENDED RELEASE ORAL EVERY EVENING
Qty: 90 TABLET | Refills: 0 | Status: SHIPPED | OUTPATIENT
Start: 2024-07-08

## 2024-07-08 RX ORDER — OLANZAPINE 5 MG/1
5 TABLET ORAL
Qty: 90 TABLET | Refills: 0 | Status: SHIPPED | OUTPATIENT
Start: 2024-07-08

## 2024-07-08 RX ORDER — ATORVASTATIN CALCIUM 40 MG/1
40 TABLET, FILM COATED ORAL DAILY
Qty: 30 TABLET | Refills: 1 | Status: SHIPPED | OUTPATIENT
Start: 2024-07-08

## 2024-07-08 RX ORDER — LEVOTHYROXINE SODIUM 0.05 MG/1
50 TABLET ORAL DAILY
Qty: 30 TABLET | Refills: 1 | Status: SHIPPED | OUTPATIENT
Start: 2024-07-08

## 2024-07-08 RX ORDER — INSULIN LISPRO 100 [IU]/ML
INJECTION, SOLUTION INTRAVENOUS; SUBCUTANEOUS
Qty: 15 ML | Refills: 0 | Status: SHIPPED | OUTPATIENT
Start: 2024-07-08

## 2024-07-08 RX ORDER — ASPIRIN 81 MG/1
81 TABLET, COATED ORAL DAILY
Qty: 90 TABLET | Refills: 0 | Status: SHIPPED | OUTPATIENT
Start: 2024-07-08

## 2024-07-08 RX ORDER — CITALOPRAM 40 MG/1
TABLET ORAL
Qty: 90 TABLET | Refills: 0 | Status: SHIPPED | OUTPATIENT
Start: 2024-07-08

## 2024-07-08 RX ORDER — ALLOPURINOL 100 MG/1
TABLET ORAL
Qty: 180 TABLET | Refills: 0 | Status: SHIPPED | OUTPATIENT
Start: 2024-07-08

## 2024-07-08 RX ORDER — CLOPIDOGREL BISULFATE 75 MG/1
75 TABLET ORAL DAILY
Qty: 90 TABLET | Refills: 0 | Status: SHIPPED | OUTPATIENT
Start: 2024-07-08

## 2024-07-08 RX ORDER — LOSARTAN POTASSIUM 25 MG/1
25 TABLET ORAL DAILY
Qty: 90 TABLET | Refills: 0 | Status: SHIPPED | OUTPATIENT
Start: 2024-07-08

## 2024-07-08 RX ORDER — ERGOCALCIFEROL 1.25 MG/1
50000 CAPSULE ORAL 3 TIMES WEEKLY
Qty: 16 CAPSULE | Refills: 0 | Status: SHIPPED | OUTPATIENT
Start: 2024-07-08

## 2024-07-20 DIAGNOSIS — I10 PRIMARY HYPERTENSION: ICD-10-CM

## 2024-07-20 RX ORDER — FUROSEMIDE 80 MG
160 TABLET ORAL DAILY
Qty: 100 TABLET | Refills: 1 | Status: SHIPPED | OUTPATIENT
Start: 2024-07-20

## 2024-08-02 ENCOUNTER — PROCEDURE VISIT (OUTPATIENT)
Dept: UROLOGY | Facility: CLINIC | Age: 64
End: 2024-08-02
Payer: COMMERCIAL

## 2024-08-02 VITALS
OXYGEN SATURATION: 98 % | HEART RATE: 66 BPM | BODY MASS INDEX: 37.67 KG/M2 | HEIGHT: 67 IN | SYSTOLIC BLOOD PRESSURE: 130 MMHG | RESPIRATION RATE: 20 BRPM | DIASTOLIC BLOOD PRESSURE: 70 MMHG | WEIGHT: 240 LBS

## 2024-08-02 DIAGNOSIS — N31.9 NEUROGENIC BLADDER: Primary | ICD-10-CM

## 2024-08-02 PROCEDURE — 51705 CHANGE OF BLADDER TUBE: CPT

## 2024-08-02 NOTE — PROGRESS NOTES
"8/2/2024    Dee Dee Shaffer  1960  [id number]    Diagnosis  Chief Complaint    Neurogenic Bladder         Patient presents for SPT change managed by our office    Plan  Patient moving at the end of the month.  Plans to establish care in [facility].    Procedure: Suprapubic Tube Change       Cystostomy tube change     Date/Time  8/2/2024 11:00 AM     Performed by  Iveth Jerry RN   Authorized by  Minor Tavarez MD     [place] Protocol   Consent: Verbal consent obtained.  Consent given by: patient     Procedure Details   Patient tolerance: patient tolerated the procedure well with no immediate complications               Current catheter removed without difficulty after deflation of an intact balloon. Site prepped with Hibiclens, new 16F  silicone  suprapubic spt change via aseptic technique without incident, 10 ml balloon inflated with sterile water. irrigated easily for straw-yellow return, mild spasm noted. Patient tolerated well. Attached to drainage bag.     Vitals:    08/02/24 1112   BP: 130/70   Pulse: 66   Resp: 20   SpO2: 98%   Weight: 109 kg (240 lb)   Height: 5' 7\" (1.702 m)         Iveth Jerry RN   "

## 2024-08-07 DIAGNOSIS — Z99.2 TYPE 2 DIABETES MELLITUS WITH CHRONIC KIDNEY DISEASE ON CHRONIC DIALYSIS, WITH LONG-TERM CURRENT USE OF INSULIN (HCC): ICD-10-CM

## 2024-08-07 DIAGNOSIS — K59.00 CONSTIPATION, UNSPECIFIED CONSTIPATION TYPE: ICD-10-CM

## 2024-08-07 DIAGNOSIS — Z79.4 TYPE 2 DIABETES MELLITUS WITH CHRONIC KIDNEY DISEASE ON CHRONIC DIALYSIS, WITH LONG-TERM CURRENT USE OF INSULIN (HCC): ICD-10-CM

## 2024-08-07 DIAGNOSIS — E11.22 TYPE 2 DIABETES MELLITUS WITH CHRONIC KIDNEY DISEASE ON CHRONIC DIALYSIS, WITH LONG-TERM CURRENT USE OF INSULIN (HCC): ICD-10-CM

## 2024-08-07 DIAGNOSIS — E11.42 TYPE 2 DIABETES MELLITUS WITH DIABETIC POLYNEUROPATHY, WITH LONG-TERM CURRENT USE OF INSULIN (HCC): ICD-10-CM

## 2024-08-07 DIAGNOSIS — N18.6 TYPE 2 DIABETES MELLITUS WITH CHRONIC KIDNEY DISEASE ON CHRONIC DIALYSIS, WITH LONG-TERM CURRENT USE OF INSULIN (HCC): ICD-10-CM

## 2024-08-07 DIAGNOSIS — Z79.4 TYPE 2 DIABETES MELLITUS WITH DIABETIC POLYNEUROPATHY, WITH LONG-TERM CURRENT USE OF INSULIN (HCC): ICD-10-CM

## 2024-08-07 DIAGNOSIS — E55.9 VITAMIN D DEFICIENCY: ICD-10-CM

## 2024-08-07 RX ORDER — INSULIN LISPRO 100 [IU]/ML
INJECTION, SOLUTION INTRAVENOUS; SUBCUTANEOUS
Qty: 15 ML | Refills: 0 | Status: SHIPPED | OUTPATIENT
Start: 2024-08-07

## 2024-08-07 RX ORDER — ERGOCALCIFEROL 1.25 MG/1
50000 CAPSULE ORAL 3 TIMES WEEKLY
Qty: 16 CAPSULE | Refills: 0 | Status: SHIPPED | OUTPATIENT
Start: 2024-08-07

## 2024-08-07 RX ORDER — DOCUSATE SODIUM 50MG AND SENNOSIDES 8.6MG 8.6; 5 MG/1; MG/1
1 TABLET, FILM COATED ORAL DAILY
Qty: 90 TABLET | Refills: 0 | Status: SHIPPED | OUTPATIENT
Start: 2024-08-07

## 2024-08-07 RX ORDER — SEMAGLUTIDE 2.68 MG/ML
INJECTION, SOLUTION SUBCUTANEOUS
Qty: 9 ML | Refills: 0 | Status: SHIPPED | OUTPATIENT
Start: 2024-08-07

## 2024-08-08 ENCOUNTER — PATIENT OUTREACH (OUTPATIENT)
Dept: FAMILY MEDICINE CLINIC | Facility: CLINIC | Age: 64
End: 2024-08-08

## 2024-08-08 DIAGNOSIS — G89.4 CHRONIC PAIN SYNDROME: ICD-10-CM

## 2024-08-08 DIAGNOSIS — F11.20 CONTINUOUS OPIOID DEPENDENCE (HCC): ICD-10-CM

## 2024-08-08 NOTE — PROGRESS NOTES
I received a call from the patient asking for a med refill; submitted to PCP.    The patient states she has not been feeling well but not offering any specific cause/symptoms.  She reports her blood sugars have been stable with frequent low readings.  She self-adjusted her insulin to 14U tid and notes she has been eating better.  The patient denies chest pain, shortness of breath or extremity edema.  Her weight is controlled with dialysis [facility].      The patient is requesting a sooner appointment than 9/9 since she is relocating 9/1; note sent to clerical.    I will continue to follow.

## 2024-08-09 ENCOUNTER — PATIENT OUTREACH (OUTPATIENT)
Dept: FAMILY MEDICINE CLINIC | Facility: CLINIC | Age: 64
End: 2024-08-09

## 2024-08-09 RX ORDER — OXYCODONE HYDROCHLORIDE 10 MG/1
10 TABLET ORAL EVERY 8 HOURS PRN
Qty: 90 TABLET | Refills: 0 | Status: SHIPPED | OUTPATIENT
Start: 2024-08-09

## 2024-08-09 NOTE — PROGRESS NOTES
I received another call from the patient regarding her refill of Oxy.  The patient noted she is late on getting this refilled as she usually gets it refilled the third of every month; note sent to PCP.

## 2024-08-14 ENCOUNTER — PATIENT OUTREACH (OUTPATIENT)
Dept: FAMILY MEDICINE CLINIC | Facility: CLINIC | Age: 64
End: 2024-08-14

## 2024-08-14 NOTE — PROGRESS NOTES
I called the patient to remind her of her mammo for this afternoon.  She requested it be rescheduled.  I called Central Scheduling and rescheduled for 8/30 at 120pm.    The patient states she is feeling better than last week.  She notes she received her medications and was thankful.    I reminded her of her PCP appointment 8/16 at 240; she plans on attending.    I will continue to follow.

## 2024-08-16 ENCOUNTER — TELEPHONE (OUTPATIENT)
Dept: FAMILY MEDICINE CLINIC | Facility: CLINIC | Age: 64
End: 2024-08-16

## 2024-08-16 NOTE — TELEPHONE ENCOUNTER
Patient message: Hi, I was calling about wondering about an appointment for my mother Dee Dee Weiss. I totally me and her cannot figure out. You guys called me about his appointment change and I just want to make sure it was exactly for what I remember. I couldn't remember it was 2:00 or 2:40. You guys said today or her appointments like 11. I don't know and she don't know either. My phone number is [phone] and my mother's phone number is [phone]. Thank you.      Patient call and cancel her fabiana due to not able to make it and she stating that for September she is not going to be around for fabiana

## 2024-08-19 ENCOUNTER — OFFICE VISIT (OUTPATIENT)
Dept: FAMILY MEDICINE CLINIC | Facility: CLINIC | Age: 64
End: 2024-08-19

## 2024-08-19 VITALS
HEART RATE: 73 BPM | HEIGHT: 67 IN | TEMPERATURE: 97.9 F | RESPIRATION RATE: 18 BRPM | BODY MASS INDEX: 37.2 KG/M2 | OXYGEN SATURATION: 98 % | DIASTOLIC BLOOD PRESSURE: 73 MMHG | SYSTOLIC BLOOD PRESSURE: 153 MMHG | WEIGHT: 237 LBS

## 2024-08-19 DIAGNOSIS — N30.00 ACUTE CYSTITIS WITHOUT HEMATURIA: Primary | ICD-10-CM

## 2024-08-19 LAB
SL AMB  POCT GLUCOSE, UA: NORMAL
SL AMB LEUKOCYTE ESTERASE,UA: POSITIVE
SL AMB POCT BILIRUBIN,UA: NEGATIVE
SL AMB POCT BLOOD,UA: ABNORMAL
SL AMB POCT CLARITY,UA: ABNORMAL
SL AMB POCT COLOR,UA: YELLOW
SL AMB POCT KETONES,UA: NEGATIVE
SL AMB POCT NITRITE,UA: NEGATIVE
SL AMB POCT PH,UA: 8
SL AMB POCT SPECIFIC GRAVITY,UA: 1.01
SL AMB POCT URINE PROTEIN: ABNORMAL
SL AMB POCT UROBILINOGEN: NEGATIVE

## 2024-08-19 PROCEDURE — 87077 CULTURE AEROBIC IDENTIFY: CPT

## 2024-08-19 PROCEDURE — 87086 URINE CULTURE/COLONY COUNT: CPT

## 2024-08-19 PROCEDURE — 87186 SC STD MICRODIL/AGAR DIL: CPT

## 2024-08-19 RX ORDER — SULFAMETHOXAZOLE/TRIMETHOPRIM 800-160 MG
1 TABLET ORAL DAILY
Qty: 7 TABLET | Refills: 0 | Status: SHIPPED | OUTPATIENT
Start: 2024-08-19 | End: 2024-08-22

## 2024-08-19 RX ORDER — SULFAMETHOXAZOLE/TRIMETHOPRIM 800-160 MG
5 TABLET ORAL DAILY
Qty: 25 TABLET | Refills: 0 | Status: SHIPPED | OUTPATIENT
Start: 2024-08-19 | End: 2024-08-19

## 2024-08-19 RX ORDER — SULFAMETHOXAZOLE/TRIMETHOPRIM 800-160 MG
5 TABLET ORAL DAILY
Qty: 14 TABLET | Refills: 0 | Status: SHIPPED | OUTPATIENT
Start: 2024-08-19 | End: 2024-08-19

## 2024-08-19 NOTE — PROGRESS NOTES
"Ambulatory Visit  Name: Dee Dee Shaffer      : 1960      MRN: [id number]  Encounter Provider: Ke Miller MD  Encounter Date: 2024   Encounter department: [facility]    Assessment & Plan   1. Acute cystitis without hematuria  Assessment & Plan:  Urinary urgency and POCT urine dip + for leukocyte esterase   No findings that would indicate severe cystitis or pyelonephritis   Patient has neurogenic bladder and has cystostomy tube    Plan:  Bactrim (dose adjusted to ESD)  Oral hydration encouraged as well as proper hygiene when manipulating catheter   ED precautions given for sepsis and pyelonephritis symptoms   Follow up 2 weeks   Orders:  -     POCT urine dip  -     Urine culture  -     sulfamethoxazole-trimethoprim (BACTRIM DS) 800-160 mg per tablet; Take 1 tablet by mouth daily for 7 days       History of Present Illness     Dee Dee is a 63 y.o female with complex PMH who presents today for evaluation of urgency for the past week. She also reports malodorous urine. Patient suffers from neurogenic bladder and End of stage renal disease.  Denies any fevers or CVA tenderness. She has history of recurrent UTIs. States that last one was about a couple of months ago. She has a cystostomy tube. Last changed on 24.   POCT urine dip: + Leukocytes esterase     Patient goes to dialysis on  ,  and           Review of Systems   Constitutional:  Negative for fever.   Genitourinary:  Positive for urgency. Negative for decreased urine volume, hematuria and pelvic pain.       Objective     /73 (BP Location: Right arm, Patient Position: Sitting, Cuff Size: Large)   Pulse 73   Temp 97.9 °F (36.6 °C) (Temporal)   Resp 18   Ht 5' 7\" (1.702 m)   Wt 108 kg (237 lb)   SpO2 98%   BMI 37.12 kg/m²     Physical Exam  Constitutional:       General: She is not in acute distress.     Appearance: She is obese. She is not ill-appearing.   HENT:      " Head: Normocephalic and atraumatic.      Nose: Nose normal.   Eyes:      Conjunctiva/sclera: Conjunctivae normal.   Cardiovascular:      Rate and Rhythm: Normal rate and regular rhythm.      Heart sounds: Murmur heard.   Pulmonary:      Effort: Pulmonary effort is normal. No respiratory distress.      Breath sounds: Normal breath sounds. No wheezing.   Abdominal:      Palpations: Abdomen is soft.      Tenderness: There is no abdominal tenderness. There is no right CVA tenderness, left CVA tenderness or guarding.      Hernia: A hernia is present.   Skin:     General: Skin is warm and dry.      Capillary Refill: Capillary refill takes less than 2 seconds.   Neurological:      Mental Status: She is alert.   Psychiatric:         Mood and Affect: Mood normal.       Administrative Statements

## 2024-08-19 NOTE — ASSESSMENT & PLAN NOTE
Urinary urgency and POCT urine dip + for leukocyte esterase   No findings that would indicate severe cystitis or pyelonephritis   Patient has neurogenic bladder and has cystostomy tube    Plan:  Bactrim (dose adjusted to ESD)  Oral hydration encouraged as well as proper hygiene when manipulating catheter   ED precautions given for sepsis and pyelonephritis symptoms   Follow up 2 weeks

## 2024-08-21 ENCOUNTER — TELEPHONE (OUTPATIENT)
Dept: FAMILY MEDICINE CLINIC | Facility: CLINIC | Age: 64
End: 2024-08-21

## 2024-08-21 LAB
BACTERIA UR CULT: ABNORMAL

## 2024-08-21 NOTE — TELEPHONE ENCOUNTER
PCP SIGNATURE NEEDED FOR [employer] FOR[employer] RECEIVED VIA FAX AND PLACED IN PCP FOLDER TO BE DELIVERED AT ASSIGNED TIMES.

## 2024-08-23 ENCOUNTER — TELEPHONE (OUTPATIENT)
Dept: FAMILY MEDICINE CLINIC | Facility: CLINIC | Age: 64
End: 2024-08-23

## 2024-08-23 NOTE — TELEPHONE ENCOUNTER
Called patient in 8/22/24 to review urine culture and antibiogram with patient.   Antibiotic was switched from Bactrim to amoxicillin.   Patient understood and agreed with management.   ED precautions were reviewed. All questions were answered to patient satisfaction.

## 2024-08-26 NOTE — TELEPHONE ENCOUNTER
FAXED ON 08/26/24 TO Myla at [phone]. FAX CONFIRMATION RECEIVED.    Scanned into pt chart.

## 2024-08-28 ENCOUNTER — PATIENT OUTREACH (OUTPATIENT)
Dept: FAMILY MEDICINE CLINIC | Facility: CLINIC | Age: 64
End: 2024-08-28

## 2024-08-28 NOTE — PROGRESS NOTES
I called the patient to follow up.  She states she has been sick for the past week.  The patient notes she recently completed a course of antibiotics for a UTI.  She then started with symptoms of sinus issues and coughing up dark green/brown phlegm with blood.  She notes a loss of appetite.  She denies fevers.  The patient states she missed dialysis yesterday because she was ill.  She notes she does not feel like she is filling with fluid.    The patient noted her daughter was not home at the present time. I asked the patient to discuss with her daughter regarding going to the ED or scheduling an appointment as she should be assessed; she agreed.    The patient stated they are still planning on leaving the area between 9/1 and 9/3.    I asked the patient to call if she needs assistance.

## 2024-08-30 ENCOUNTER — APPOINTMENT (EMERGENCY)
Dept: RADIOLOGY | Facility: HOSPITAL | Age: 64
DRG: 981 | End: 2024-08-30
Payer: COMMERCIAL

## 2024-08-30 ENCOUNTER — HOSPITAL ENCOUNTER (INPATIENT)
Facility: HOSPITAL | Age: 64
LOS: 7 days | Discharge: HOME | DRG: 981 | End: 2024-09-06
Attending: EMERGENCY MEDICINE | Admitting: INTERNAL MEDICINE
Payer: COMMERCIAL

## 2024-08-30 ENCOUNTER — PATIENT OUTREACH (OUTPATIENT)
Dept: FAMILY MEDICINE CLINIC | Facility: CLINIC | Age: 64
End: 2024-08-30

## 2024-08-30 DIAGNOSIS — Z79.4 TYPE 2 DIABETES MELLITUS WITH CHRONIC KIDNEY DISEASE ON CHRONIC DIALYSIS, WITH LONG-TERM CURRENT USE OF INSULIN (HCC): ICD-10-CM

## 2024-08-30 DIAGNOSIS — I50.9 ACUTE CONGESTIVE HEART FAILURE (HCC): Primary | ICD-10-CM

## 2024-08-30 DIAGNOSIS — N18.6 TYPE 2 DIABETES MELLITUS WITH CHRONIC KIDNEY DISEASE ON CHRONIC DIALYSIS, WITH LONG-TERM CURRENT USE OF INSULIN (HCC): ICD-10-CM

## 2024-08-30 DIAGNOSIS — E11.42 TYPE 2 DIABETES MELLITUS WITH DIABETIC POLYNEUROPATHY, WITH LONG-TERM CURRENT USE OF INSULIN (HCC): ICD-10-CM

## 2024-08-30 DIAGNOSIS — I25.118 CORONARY ARTERY DISEASE WITH STABLE ANGINA PECTORIS (HCC): ICD-10-CM

## 2024-08-30 DIAGNOSIS — R09.02 HYPOXIA: ICD-10-CM

## 2024-08-30 DIAGNOSIS — D64.9 ANEMIA: ICD-10-CM

## 2024-08-30 DIAGNOSIS — N18.6 ESRD (END STAGE RENAL DISEASE) (HCC): ICD-10-CM

## 2024-08-30 DIAGNOSIS — N17.9 AKI (ACUTE KIDNEY INJURY) (HCC): ICD-10-CM

## 2024-08-30 DIAGNOSIS — Z99.2 TYPE 2 DIABETES MELLITUS WITH CHRONIC KIDNEY DISEASE ON CHRONIC DIALYSIS, WITH LONG-TERM CURRENT USE OF INSULIN (HCC): ICD-10-CM

## 2024-08-30 DIAGNOSIS — E11.22 TYPE 2 DIABETES MELLITUS WITH CHRONIC KIDNEY DISEASE ON CHRONIC DIALYSIS, WITH LONG-TERM CURRENT USE OF INSULIN (HCC): ICD-10-CM

## 2024-08-30 DIAGNOSIS — R79.89 ELEVATED TROPONIN: ICD-10-CM

## 2024-08-30 DIAGNOSIS — R79.89 TROPONIN LEVEL ELEVATED: ICD-10-CM

## 2024-08-30 DIAGNOSIS — S90.829A BLISTER OF FOOT: ICD-10-CM

## 2024-08-30 DIAGNOSIS — Z79.4 TYPE 2 DIABETES MELLITUS WITH DIABETIC POLYNEUROPATHY, WITH LONG-TERM CURRENT USE OF INSULIN (HCC): ICD-10-CM

## 2024-08-30 DIAGNOSIS — N18.9 CKD (CHRONIC KIDNEY DISEASE): ICD-10-CM

## 2024-08-30 LAB
2HR DELTA HS TROPONIN: -1344 NG/L
4HR DELTA HS TROPONIN: -1196 NG/L
ALBUMIN SERPL BCG-MCNC: 3.6 G/DL (ref 3.5–5)
ALP SERPL-CCNC: 93 U/L (ref 34–104)
ALT SERPL W P-5'-P-CCNC: 91 U/L (ref 7–52)
AMORPH URATE CRY URNS QL MICRO: ABNORMAL
ANION GAP SERPL CALCULATED.3IONS-SCNC: 18 MMOL/L (ref 4–13)
APTT PPP: 38 SECONDS (ref 23–34)
AST SERPL W P-5'-P-CCNC: 188 U/L (ref 13–39)
ATRIAL RATE: 70 BPM
ATRIAL RATE: 72 BPM
BACTERIA UR QL AUTO: ABNORMAL /HPF
BASOPHILS # BLD AUTO: 0.01 THOUSANDS/ÂΜL (ref 0–0.1)
BASOPHILS NFR BLD AUTO: 0 % (ref 0–1)
BILIRUB SERPL-MCNC: 0.67 MG/DL (ref 0.2–1)
BILIRUB UR QL STRIP: ABNORMAL
BNP SERPL-MCNC: 3596 PG/ML (ref 0–100)
BUN SERPL-MCNC: 50 MG/DL (ref 5–25)
CALCIUM SERPL-MCNC: 8.4 MG/DL (ref 8.4–10.2)
CARDIAC TROPONIN I PNL SERPL HS: ABNORMAL NG/L
CHLORIDE SERPL-SCNC: 90 MMOL/L (ref 96–108)
CLARITY UR: CLEAR
CO2 SERPL-SCNC: 23 MMOL/L (ref 21–32)
COLOR UR: YELLOW
CREAT SERPL-MCNC: 4.56 MG/DL (ref 0.6–1.3)
EOSINOPHIL # BLD AUTO: 0 THOUSAND/ÂΜL (ref 0–0.61)
EOSINOPHIL NFR BLD AUTO: 0 % (ref 0–6)
ERYTHROCYTE [DISTWIDTH] IN BLOOD BY AUTOMATED COUNT: 13.3 % (ref 11.6–15.1)
GFR SERPL CREATININE-BSD FRML MDRD: 9 ML/MIN/1.73SQ M
GLUCOSE SERPL-MCNC: 213 MG/DL (ref 65–140)
GLUCOSE UR STRIP-MCNC: NEGATIVE MG/DL
HCT VFR BLD AUTO: 28.1 % (ref 34.8–46.1)
HGB BLD-MCNC: 9.6 G/DL (ref 11.5–15.4)
HGB UR QL STRIP.AUTO: ABNORMAL
HYALINE CASTS #/AREA URNS LPF: ABNORMAL /LPF
IMM GRANULOCYTES # BLD AUTO: 0.07 THOUSAND/UL (ref 0–0.2)
IMM GRANULOCYTES NFR BLD AUTO: 1 % (ref 0–2)
INR PPP: 1.37 (ref 0.85–1.19)
KETONES UR STRIP-MCNC: ABNORMAL MG/DL
LEUKOCYTE ESTERASE UR QL STRIP: ABNORMAL
LYMPHOCYTES # BLD AUTO: 1.19 THOUSANDS/ÂΜL (ref 0.6–4.47)
LYMPHOCYTES NFR BLD AUTO: 15 % (ref 14–44)
MCH RBC QN AUTO: 33.6 PG (ref 26.8–34.3)
MCHC RBC AUTO-ENTMCNC: 34.2 G/DL (ref 31.4–37.4)
MCV RBC AUTO: 98 FL (ref 82–98)
MONOCYTES # BLD AUTO: 0.65 THOUSAND/ÂΜL (ref 0.17–1.22)
MONOCYTES NFR BLD AUTO: 8 % (ref 4–12)
MUCOUS THREADS UR QL AUTO: ABNORMAL
NEUTROPHILS # BLD AUTO: 6.1 THOUSANDS/ÂΜL (ref 1.85–7.62)
NEUTS SEG NFR BLD AUTO: 76 % (ref 43–75)
NITRITE UR QL STRIP: NEGATIVE
NON-SQ EPI CELLS URNS QL MICRO: ABNORMAL /HPF
NRBC BLD AUTO-RTO: 0 /100 WBCS
P AXIS: 35 DEGREES
P AXIS: 46 DEGREES
PH UR STRIP.AUTO: 5 [PH] (ref 4.5–8)
PLATELET # BLD AUTO: 225 THOUSANDS/UL (ref 149–390)
PMV BLD AUTO: 9.9 FL (ref 8.9–12.7)
POTASSIUM SERPL-SCNC: 3.9 MMOL/L (ref 3.5–5.3)
PR INTERVAL: 156 MS
PR INTERVAL: 160 MS
PROT SERPL-MCNC: 6.8 G/DL (ref 6.4–8.4)
PROT UR STRIP-MCNC: >=300 MG/DL
PROTHROMBIN TIME: 17 SECONDS (ref 12.3–15)
QRS AXIS: -22 DEGREES
QRS AXIS: 134 DEGREES
QRSD INTERVAL: 162 MS
QRSD INTERVAL: 166 MS
QT INTERVAL: 470 MS
QT INTERVAL: 472 MS
QTC INTERVAL: 509 MS
QTC INTERVAL: 514 MS
RBC # BLD AUTO: 2.86 MILLION/UL (ref 3.81–5.12)
RBC #/AREA URNS AUTO: ABNORMAL /HPF
SODIUM SERPL-SCNC: 131 MMOL/L (ref 135–147)
SP GR UR STRIP.AUTO: >=1.03 (ref 1–1.03)
T WAVE AXIS: -55 DEGREES
T WAVE AXIS: 171 DEGREES
TSH SERPL DL<=0.05 MIU/L-ACNC: 1.8 UIU/ML (ref 0.45–4.5)
UROBILINOGEN UR QL STRIP.AUTO: 0.2 E.U./DL
VENTRICULAR RATE: 70 BPM
VENTRICULAR RATE: 72 BPM
WBC # BLD AUTO: 8.02 THOUSAND/UL (ref 4.31–10.16)
WBC #/AREA URNS AUTO: ABNORMAL /HPF

## 2024-08-30 PROCEDURE — 85025 COMPLETE CBC W/AUTO DIFF WBC: CPT | Performed by: EMERGENCY MEDICINE

## 2024-08-30 PROCEDURE — 87186 SC STD MICRODIL/AGAR DIL: CPT

## 2024-08-30 PROCEDURE — 81001 URINALYSIS AUTO W/SCOPE: CPT

## 2024-08-30 PROCEDURE — 80053 COMPREHEN METABOLIC PANEL: CPT | Performed by: EMERGENCY MEDICINE

## 2024-08-30 PROCEDURE — 84484 ASSAY OF TROPONIN QUANT: CPT | Performed by: EMERGENCY MEDICINE

## 2024-08-30 PROCEDURE — 96365 THER/PROPH/DIAG IV INF INIT: CPT

## 2024-08-30 PROCEDURE — 96375 TX/PRO/DX INJ NEW DRUG ADDON: CPT

## 2024-08-30 PROCEDURE — 83880 ASSAY OF NATRIURETIC PEPTIDE: CPT | Performed by: EMERGENCY MEDICINE

## 2024-08-30 PROCEDURE — 36415 COLL VENOUS BLD VENIPUNCTURE: CPT | Performed by: EMERGENCY MEDICINE

## 2024-08-30 PROCEDURE — 85730 THROMBOPLASTIN TIME PARTIAL: CPT | Performed by: INTERNAL MEDICINE

## 2024-08-30 PROCEDURE — 99285 EMERGENCY DEPT VISIT HI MDM: CPT

## 2024-08-30 PROCEDURE — 85610 PROTHROMBIN TIME: CPT | Performed by: INTERNAL MEDICINE

## 2024-08-30 PROCEDURE — 87086 URINE CULTURE/COLONY COUNT: CPT

## 2024-08-30 PROCEDURE — 93005 ELECTROCARDIOGRAM TRACING: CPT

## 2024-08-30 PROCEDURE — 84443 ASSAY THYROID STIM HORMONE: CPT | Performed by: EMERGENCY MEDICINE

## 2024-08-30 PROCEDURE — 99223 1ST HOSP IP/OBS HIGH 75: CPT | Performed by: INTERNAL MEDICINE

## 2024-08-30 PROCEDURE — 99291 CRITICAL CARE FIRST HOUR: CPT | Performed by: EMERGENCY MEDICINE

## 2024-08-30 PROCEDURE — 87081 CULTURE SCREEN ONLY: CPT | Performed by: INTERNAL MEDICINE

## 2024-08-30 PROCEDURE — 71046 X-RAY EXAM CHEST 2 VIEWS: CPT

## 2024-08-30 PROCEDURE — 87077 CULTURE AEROBIC IDENTIFY: CPT

## 2024-08-30 RX ORDER — CALCITRIOL 0.25 UG/1
0.5 CAPSULE, LIQUID FILLED ORAL 3 TIMES WEEKLY
Status: DISCONTINUED | OUTPATIENT
Start: 2024-08-30 | End: 2024-08-31

## 2024-08-30 RX ORDER — OXYCODONE HYDROCHLORIDE 5 MG/1
5 TABLET ORAL EVERY 4 HOURS PRN
Status: DISCONTINUED | OUTPATIENT
Start: 2024-08-30 | End: 2024-09-04 | Stop reason: SDUPTHER

## 2024-08-30 RX ORDER — LACTULOSE 10 G/15ML
20 SOLUTION ORAL DAILY PRN
Status: DISCONTINUED | OUTPATIENT
Start: 2024-08-30 | End: 2024-09-06 | Stop reason: HOSPADM

## 2024-08-30 RX ORDER — LOSARTAN POTASSIUM 25 MG/1
25 TABLET ORAL DAILY
Status: DISCONTINUED | OUTPATIENT
Start: 2024-08-31 | End: 2024-09-02

## 2024-08-30 RX ORDER — OLANZAPINE 5 MG/1
5 TABLET ORAL
Status: DISCONTINUED | OUTPATIENT
Start: 2024-08-30 | End: 2024-09-06 | Stop reason: HOSPADM

## 2024-08-30 RX ORDER — ALLOPURINOL 100 MG/1
200 TABLET ORAL DAILY
Status: DISCONTINUED | OUTPATIENT
Start: 2024-08-31 | End: 2024-09-06 | Stop reason: HOSPADM

## 2024-08-30 RX ORDER — PANTOPRAZOLE SODIUM 40 MG/1
40 TABLET, DELAYED RELEASE ORAL
Status: DISCONTINUED | OUTPATIENT
Start: 2024-08-31 | End: 2024-09-06 | Stop reason: HOSPADM

## 2024-08-30 RX ORDER — CITALOPRAM HYDROBROMIDE 20 MG/1
40 TABLET ORAL DAILY
Status: DISCONTINUED | OUTPATIENT
Start: 2024-08-31 | End: 2024-09-06 | Stop reason: HOSPADM

## 2024-08-30 RX ORDER — MIDODRINE HYDROCHLORIDE 5 MG/1
5 TABLET ORAL
Status: DISCONTINUED | OUTPATIENT
Start: 2024-08-30 | End: 2024-09-06 | Stop reason: HOSPADM

## 2024-08-30 RX ORDER — HEPARIN SODIUM 5000 [USP'U]/ML
5000 INJECTION, SOLUTION INTRAVENOUS; SUBCUTANEOUS EVERY 8 HOURS SCHEDULED
Status: DISCONTINUED | OUTPATIENT
Start: 2024-08-30 | End: 2024-08-30

## 2024-08-30 RX ORDER — ATORVASTATIN CALCIUM 40 MG/1
40 TABLET, FILM COATED ORAL DAILY
Status: DISCONTINUED | OUTPATIENT
Start: 2024-08-31 | End: 2024-08-31

## 2024-08-30 RX ORDER — HEPARIN SODIUM 1000 [USP'U]/ML
2000 INJECTION, SOLUTION INTRAVENOUS; SUBCUTANEOUS EVERY 6 HOURS PRN
Status: DISCONTINUED | OUTPATIENT
Start: 2024-08-30 | End: 2024-09-04

## 2024-08-30 RX ORDER — HEPARIN SODIUM 1000 [USP'U]/ML
4000 INJECTION, SOLUTION INTRAVENOUS; SUBCUTANEOUS ONCE
Status: COMPLETED | OUTPATIENT
Start: 2024-08-30 | End: 2024-08-30

## 2024-08-30 RX ORDER — LEVOTHYROXINE SODIUM 50 UG/1
50 TABLET ORAL
Status: DISCONTINUED | OUTPATIENT
Start: 2024-08-31 | End: 2024-09-06 | Stop reason: HOSPADM

## 2024-08-30 RX ORDER — HEPARIN SODIUM 10000 [USP'U]/100ML
3-20 INJECTION, SOLUTION INTRAVENOUS
Status: DISCONTINUED | OUTPATIENT
Start: 2024-08-30 | End: 2024-09-04

## 2024-08-30 RX ORDER — SEVELAMER HYDROCHLORIDE 800 MG/1
800 TABLET, FILM COATED ORAL
Status: DISCONTINUED | OUTPATIENT
Start: 2024-08-31 | End: 2024-08-31

## 2024-08-30 RX ORDER — CLOPIDOGREL BISULFATE 75 MG/1
75 TABLET ORAL DAILY
Status: DISCONTINUED | OUTPATIENT
Start: 2024-08-31 | End: 2024-09-06 | Stop reason: HOSPADM

## 2024-08-30 RX ORDER — ASPIRIN 81 MG/1
324 TABLET, CHEWABLE ORAL ONCE
Status: COMPLETED | OUTPATIENT
Start: 2024-08-30 | End: 2024-08-30

## 2024-08-30 RX ORDER — ONDANSETRON 2 MG/ML
4 INJECTION INTRAMUSCULAR; INTRAVENOUS EVERY 6 HOURS PRN
Status: DISCONTINUED | OUTPATIENT
Start: 2024-08-30 | End: 2024-09-06 | Stop reason: HOSPADM

## 2024-08-30 RX ORDER — AMOXICILLIN 250 MG
1 CAPSULE ORAL DAILY
Status: DISCONTINUED | OUTPATIENT
Start: 2024-08-31 | End: 2024-09-06 | Stop reason: HOSPADM

## 2024-08-30 RX ORDER — ERGOCALCIFEROL 1.25 MG/1
50000 CAPSULE, LIQUID FILLED ORAL 3 TIMES WEEKLY
Status: DISCONTINUED | OUTPATIENT
Start: 2024-08-30 | End: 2024-09-06 | Stop reason: HOSPADM

## 2024-08-30 RX ORDER — FUROSEMIDE 10 MG/ML
40 INJECTION INTRAMUSCULAR; INTRAVENOUS ONCE
Status: DISCONTINUED | OUTPATIENT
Start: 2024-08-30 | End: 2024-08-30

## 2024-08-30 RX ORDER — INSULIN GLARGINE 100 [IU]/ML
20 INJECTION, SOLUTION SUBCUTANEOUS EVERY MORNING
Status: DISCONTINUED | OUTPATIENT
Start: 2024-08-31 | End: 2024-09-06 | Stop reason: HOSPADM

## 2024-08-30 RX ORDER — FUROSEMIDE 10 MG/ML
120 INJECTION INTRAMUSCULAR; INTRAVENOUS 2 TIMES DAILY
Status: DISCONTINUED | OUTPATIENT
Start: 2024-08-30 | End: 2024-09-01

## 2024-08-30 RX ORDER — DOCUSATE SODIUM 100 MG/1
100 CAPSULE, LIQUID FILLED ORAL 2 TIMES DAILY
Status: DISCONTINUED | OUTPATIENT
Start: 2024-08-30 | End: 2024-09-06 | Stop reason: HOSPADM

## 2024-08-30 RX ORDER — SODIUM CHLORIDE, SODIUM GLUCONATE, SODIUM ACETATE, POTASSIUM CHLORIDE, MAGNESIUM CHLORIDE, SODIUM PHOSPHATE, DIBASIC, AND POTASSIUM PHOSPHATE .53; .5; .37; .037; .03; .012; .00082 G/100ML; G/100ML; G/100ML; G/100ML; G/100ML; G/100ML; G/100ML
1000 INJECTION, SOLUTION INTRAVENOUS ONCE
Status: COMPLETED | OUTPATIENT
Start: 2024-08-30 | End: 2024-08-30

## 2024-08-30 RX ORDER — ACETAMINOPHEN 325 MG/1
650 TABLET ORAL EVERY 4 HOURS PRN
Status: DISCONTINUED | OUTPATIENT
Start: 2024-08-30 | End: 2024-09-04 | Stop reason: SDUPTHER

## 2024-08-30 RX ORDER — FUROSEMIDE 10 MG/ML
40 INJECTION INTRAMUSCULAR; INTRAVENOUS ONCE
Status: COMPLETED | OUTPATIENT
Start: 2024-08-30 | End: 2024-08-30

## 2024-08-30 RX ORDER — ISOSORBIDE MONONITRATE 30 MG/1
30 TABLET, EXTENDED RELEASE ORAL EVERY EVENING
Status: DISCONTINUED | OUTPATIENT
Start: 2024-08-30 | End: 2024-09-06 | Stop reason: HOSPADM

## 2024-08-30 RX ORDER — OXYCODONE HYDROCHLORIDE 10 MG/1
10 TABLET ORAL EVERY 4 HOURS PRN
Status: DISCONTINUED | OUTPATIENT
Start: 2024-08-30 | End: 2024-09-06 | Stop reason: HOSPADM

## 2024-08-30 RX ORDER — HEPARIN SODIUM 1000 [USP'U]/ML
4000 INJECTION, SOLUTION INTRAVENOUS; SUBCUTANEOUS EVERY 6 HOURS PRN
Status: DISCONTINUED | OUTPATIENT
Start: 2024-08-30 | End: 2024-09-04

## 2024-08-30 RX ADMIN — CALCITRIOL 0.5 MCG: 0.25 CAPSULE, LIQUID FILLED ORAL at 20:56

## 2024-08-30 RX ADMIN — FUROSEMIDE 40 MG: 10 INJECTION, SOLUTION INTRAMUSCULAR; INTRAVENOUS at 16:47

## 2024-08-30 RX ADMIN — ASPIRIN 81 MG CHEWABLE TABLET 324 MG: 81 TABLET CHEWABLE at 16:33

## 2024-08-30 RX ADMIN — SODIUM CHLORIDE, SODIUM GLUCONATE, SODIUM ACETATE, POTASSIUM CHLORIDE, MAGNESIUM CHLORIDE, SODIUM PHOSPHATE, DIBASIC, AND POTASSIUM PHOSPHATE 1000 ML: .53; .5; .37; .037; .03; .012; .00082 INJECTION, SOLUTION INTRAVENOUS at 15:29

## 2024-08-30 RX ADMIN — OXYCODONE HYDROCHLORIDE 10 MG: 10 TABLET ORAL at 22:44

## 2024-08-30 RX ADMIN — OLANZAPINE 5 MG: 5 TABLET, FILM COATED ORAL at 20:57

## 2024-08-30 RX ADMIN — HEPARIN SODIUM 11.1 UNITS/KG/HR: 10000 INJECTION, SOLUTION INTRAVENOUS at 22:42

## 2024-08-30 RX ADMIN — ERGOCALCIFEROL 50000 UNITS: 1.25 CAPSULE ORAL at 20:56

## 2024-08-30 RX ADMIN — FUROSEMIDE 120 MG: 10 INJECTION, SOLUTION INTRAVENOUS at 20:57

## 2024-08-30 RX ADMIN — HEPARIN SODIUM 5000 UNITS: 5000 INJECTION INTRAVENOUS; SUBCUTANEOUS at 20:58

## 2024-08-30 RX ADMIN — HEPARIN SODIUM 4000 UNITS: 1000 INJECTION INTRAVENOUS; SUBCUTANEOUS at 22:42

## 2024-08-30 RX ADMIN — DOCUSATE SODIUM 100 MG: 100 CAPSULE, LIQUID FILLED ORAL at 20:56

## 2024-08-30 RX ADMIN — ISOSORBIDE MONONITRATE 30 MG: 30 TABLET, EXTENDED RELEASE ORAL at 20:56

## 2024-08-30 NOTE — ED NOTES
Pt's urine catheter bag changed at this time for sterile collection of urine sample       Kaci Shah RN  08/30/24 [id number]

## 2024-08-30 NOTE — ASSESSMENT & PLAN NOTE
Patient currently 98% SpO2 on 2 L nasal cannula  Likely in acute decompensated heart failure  Do not suspect active COPD exacerbation, continue home medications

## 2024-08-30 NOTE — ASSESSMENT & PLAN NOTE
Lab Results   Component Value Date    EGFR 9 08/30/2024    EGFR 13 04/10/2024    EGFR 14 02/06/2023    CREATININE 4.56 (H) 08/30/2024    CREATININE 3.51 (H) 04/10/2024    CREATININE 3.28 (H) 02/06/2023     Patient with ESRD on dialysis Tuesdays, Thursdays, Saturdays  Consult to nephrology; supportive care

## 2024-08-30 NOTE — ASSESSMENT & PLAN NOTE
"Lab Results   Component Value Date    HGBA1C 5.7 (H) 04/10/2024       No results for input(s): \"POCGLU\" in the last 72 hours.    Blood Sugar Average: Last 72 hrs:    SSI; Subcutaneous Insulin Order Set  Blood Glucose checks TIDWM and QHS (Q6H for NPO patients)  Hold oral medications  Blood Glucose goal while inpatient is 140-180  Reduce basal insulin by 25-50% while inpatient  Consistent Carbohydrate Diet      "

## 2024-08-30 NOTE — ED PROVIDER NOTES
History  Chief Complaint   Patient presents with    Weakness - Generalized     Pt c/o generalized weakness for the past x 1 week. Pt denies cp/sob/v/d or fevers. Pt states she does have nausea. Pt just had dialysis yesterday.     63-year-old female presents for evaluation of worsening shortness of breath over the past week and weakness.  Patient states that she has been feeling short of breath has been constant, getting progressively worse and is worse with exertion.  She states she feels weak with this as well at the point that she to be carried up her stairs prompting her to seek evaluation.  She denies chest pain, abdominal pain or back/flank pain, cough neurosymptoms, fevers, chills, risk factors for DVT or PE.      History provided by:  Patient      Prior to Admission Medications   Prescriptions Last Dose Informant Patient Reported? Taking?   Blood Glucose Monitoring Suppl (OneTouch Verio Reflect) w/Device KIT  Self No No   Sig: Check blood sugars once daily. Please substitute with appropriate alternative as covered by patient's insurance. Dx: E11.65   Continuous Blood Gluc Sensor (Dexcom G7 Sensor)  Self No No   Sig: Use 1 Device every 10 days   Insulin Pen Needle (BD Pen Needle Micro U/F) 32G X 6 MM MISC  Self No No   Sig: Inject under the skin 3 (three) times a day   Insulin Pen Needle (BD Pen Needle Joanne 2nd Gen) 32G X 4 MM MISC  Self No No   Sig: INJECT UNDER THE SKIN 4 (FOUR) TIMES A DAY USE AS DIRECTED   OLANZapine (ZyPREXA) 5 mg tablet   No No   Sig: TAKE 1 TABLET (5 MG TOTAL) BY MOUTH DAILY AT BEDTIME   OneTouch Delica Lancets 33G MISC  Self No No   Sig: Check blood sugars once daily. Please substitute with appropriate alternative as covered by patient's insurance. Dx: E11.65   Ozempic, 2 MG/DOSE, 8 MG/3ML injection pen   No No   Sig: INJECT 0.75 ML (2 MG TOTAL) UNDER THE SKIN EVERY 7 DAYS   allopurinol (ZYLOPRIM) 100 mg tablet   No No   Sig: TAKE 2 TABLETS (200 MG) BY MOUTH DAILY   amoxicillin  (AMOXIL) 500 mg capsule   No No   Sig: Take 1 capsule (500 mg total) by mouth every 24 hours for 7 days   aspirin (Aspirin Low Dose) 81 mg EC tablet   No No   Sig: TAKE 1 TABLET (81 MG TOTAL) BY MOUTH DAILY   atorvastatin (LIPITOR) 40 mg tablet   No No   Sig: TAKE 1 TABLET (40 MG TOTAL) BY MOUTH DAILY   calcitriol (ROCALTROL) 0.5 MCG capsule  Self No No   Sig: Take 1 capsule (0.5 mcg total) by mouth 3 (three) times a week   citalopram (CeleXA) 40 mg tablet   No No   Sig: TAKE 1 TABLET BY MOUTH DAILY   clopidogrel (PLAVIX) 75 mg tablet   No No   Sig: TAKE 1 TABLET (75 MG TOTAL) BY MOUTH DAILY   docusate sodium (COLACE) 100 mg capsule  Self No No   Sig: Take 1 capsule (100 mg total) by mouth 2 (two) times a day   ergocalciferol (VITAMIN D2) 50,000 units   No No   Sig: TAKE 1 CAPSULE (50,000 UNITS TOTAL) BY MOUTH 3 (THREE) TIMES A WEEK   furosemide (LASIX) 80 mg tablet   No No   Sig: TAKE 2 TABLETS (160 MG TOTAL) BY MOUTH DAILY   glucose blood (OneTouch Verio) test strip  Self No No   Sig: Check blood sugars once daily. Please substitute with appropriate alternative as covered by patient's insurance. Dx: E11.65   hydrALAZINE (APRESOLINE) 25 mg tablet   No No   Sig: TAKE 1 TABLET (25 MG TOTAL) BY MOUTH 3 (THREE) TIMES A DAY HOLD SBP <100   hydrALAZINE (APRESOLINE) 25 mg tablet   No No   Sig: TAKE 1 TABLET (25 MG TOTAL) BY MOUTH 3 (THREE) TIMES A DAY HOLD SBP <100   insulin degludec (Tresiba FlexTouch) 200 units/mL CONCENTRATED U-200 injection pen  Self No No   Sig: Inject 35 units once daily   insulin lispro (HumaLOG) 100 units/mL injection pen   No No   Sig: INJECT 20 UNITS UNDER SKIN THREE TIMES A DAY BEFORE MEALS   isosorbide mononitrate (IMDUR) 30 mg 24 hr tablet   No No   Sig: TAKE 1 TABLET (30 MG TOTAL) BY MOUTH EVERY EVENING   ketoconazole (NIZORAL) 2 % cream  Self Yes No   lactulose (CHRONULAC) 10 g/15 mL solution   No No   Sig: TAKE 30 ML (20 G TOTAL) BY MOUTH DAILY AS NEEDED (FOR CONSTIPATION)   levothyroxine  50 mcg tablet   No No   Sig: TAKE 1 TABLET (50 MCG TOTAL) BY MOUTH DAILY   losartan (COZAAR) 25 mg tablet   No No   Sig: TAKE 1 TABLET (25 MG TOTAL) BY MOUTH DAILY   midodrine (PROAMATINE) 5 mg tablet  Self No No   Sig: TAKE 1 TABLET (5 MG TOTAL) BY MOUTH AS NEEDED (IF SBP<100 WITH DIALYSIS)   naloxone (NARCAN) 4 mg/0.1 mL nasal spray  Self No No   Sig: Administer 1 spray into a nostril. If breathing does not return to normal or if breathing difficulty resumes after 2-3 minutes, give another dose in the other nostril using a new spray.   nitroglycerin (NITROSTAT) 0.4 mg SL tablet  Self No No   Sig: Place 1 tablet (0.4 mg total) under the tongue every 5 (five) minutes as needed for chest pain   nystatin (MYCOSTATIN) powder  Self No No   Sig: Apply topically 3 (three) times a day   ondansetron (ZOFRAN) 4 mg tablet  Self No No   Sig: Take 1 tablet (4 mg total) by mouth every 8 (eight) hours as needed for nausea or vomiting   oxyCODONE (ROXICODONE) 10 MG TABS   No No   Sig: Take 1 tablet (10 mg total) by mouth every 8 (eight) hours as needed for severe pain 30 days supply Max Daily Amount: 30 mg   pantoprazole (PROTONIX) 40 mg tablet  Self No No   Sig: TAKE 1 TABLET (40 MG TOTAL) BY MOUTH DAILY   senna-docusate sodium (Senexon-S) 8.6-50 mg per tablet   No No   Sig: TAKE 1 TABLET BY MOUTH DAILY   sevelamer carbonate (RENVELA) 800 mg tablet   No No   Sig: TAKE 2 TABLETS (1,600 MG TOTAL) BY MOUTH 3 (THREE) TIMES A DAY WITH MEALS   silver sulfadiazine (SILVADENE,SSD) 1 % cream  Self No No   Sig: Apply topically daily      Facility-Administered Medications Last Administration Doses Remaining   cyanocobalamin injection 1,000 mcg 1/5/2023  4:51 PM           Past Medical History:   Diagnosis Date    Abnormal liver function     Anemia     Anxiety     Arthritis     CHF (congestive heart failure) (HCC)     Chronic kidney disease     stage 3    Chronic narcotic dependence (HCC)     Chronic pain disorder     lower back, hands , neck  and knees    Coronary artery disease     Depression     Diabetes mellitus (HCC)     Elevated troponin 02/11/2022    GERD (gastroesophageal reflux disease)     no meds at present    Heart murmur     murmur    Hyperlipidemia     Hypertension     Neurogenic bladder     Open toe wound 12/2020    right big toe open calus but is dry at present    PONV (postoperative nausea and vomiting)     Renal disorder     Shortness of breath     exertion    Skin ulcer of hand, limited to breakdown of skin (HCC) 02/25/2021    Sleep apnea     doesn't use cpap    Suprapubic catheter (HCC)     Urinary retention        Past Surgical History:   Procedure Laterality Date    AMPUTATION Left     2nd toe    ANGIOPLASTY  2017    5    BREAST EXCISIONAL BIOPSY Left     BREAST SURGERY      CARDIAC CATHETERIZATION      CARDIAC CATHETERIZATION N/A 07/01/2022    Procedure: Cardiac catheterization;  Surgeon: Minh Franz MD;  Location: [facility];  Service: Cardiology    CARDIAC CATHETERIZATION N/A 07/01/2022    Procedure: Cardiac pci;  Surgeon: Minh Franz MD;  Location: [facility];  Service: Cardiology    CARPAL TUNNEL RELEASE Bilateral     CERVICAL FUSION      HYSTERECTOMY  2008    IR AV FISTULAGRAM/GRAFTOGRAM  04/26/2023    IR AV FISTULAGRAM/GRAFTOGRAM  2/28/2024    IR SUPRAPUBIC CATHETER PLACEMENT  06/15/2021    IR SUPRAPUBIC CATHETER PLACEMENT  02/14/2023    IR TUNNELED CENTRAL LINE PLACEMENT  04/04/2023    IR TUNNELED DIALYSIS CATHETER PLACEMENT  07/15/2022    IR TUNNELED DIALYSIS CATHETER PLACEMENT  12/12/2022    IR TUNNELED DIALYSIS CATHETER REMOVAL  8/29/2023    KNEE SURGERY      OOPHORECTOMY  2008    VA ARTERIOVENOUS ANASTOMOSIS OPEN DIRECT Left 02/06/2023    Procedure: CREATION FISTULA  ARTERIOVENOUS (AV);  Surgeon: Minna Herbert DO;  Location: [facility];  Service: Vascular    VA EXC B9 LESION MRGN XCP SK TG S/N/H/F/G 3.1-4.0CM N/A 12/21/2020    Procedure: EXCISION SEBACEOUS CYST X 5 SCALP;  Surgeon: Minh Benton  MD;  Location:  MAIN OR;  Service: General    WV REVJ OPN ARVEN FSTL W/O THRMBC DIAL GRF Left 7/3/2023    Procedure: REVISION AV FISTULA;  Surgeon: Minna Herbert DO;  Location: [facility];  Service: Vascular    TOE AMPUTATION Left     TRIGGER FINGER RELEASE Right     4th Finger       Family History   Problem Relation Age of Onset    Stroke Father     Heart disease Father     No Known Problems Mother     No Known Problems Sister     No Known Problems Daughter     No Known Problems Maternal Grandmother     No Known Problems Maternal Grandfather     No Known Problems Paternal Grandmother     No Known Problems Paternal Grandfather     No Known Problems Maternal Aunt     No Known Problems Maternal Aunt     No Known Problems Maternal Aunt     No Known Problems Maternal Aunt     No Known Problems Maternal Aunt     No Known Problems Maternal Aunt     No Known Problems Paternal Aunt      I have reviewed and agree with the history as documented.    E-Cigarette/Vaping    E-Cigarette Use Never User      E-Cigarette/Vaping Substances    Nicotine No     THC No     CBD No     Flavoring No     Other No     Unknown No      Social History     Tobacco Use    Smoking status: Former     Current packs/day: 0.00     Average packs/day: 1 pack/day for 35.0 years (35.0 ttl pk-yrs)     Types: Cigarettes     Start date:      Quit date:      Years since quittin.6     Passive exposure: Past    Smokeless tobacco: Never   Vaping Use    Vaping status: Never Used   Substance Use Topics    Alcohol use: Not Currently    Drug use: Never       Review of Systems   Constitutional:  Positive for fatigue. Negative for activity change, appetite change and fever.   HENT:  Negative for congestion, dental problem, ear pain, rhinorrhea and sore throat.    Eyes:  Negative for pain and redness.   Respiratory:  Positive for shortness of breath. Negative for chest tightness and wheezing.    Cardiovascular:  Negative for chest pain and  palpitations.   Gastrointestinal:  Negative for abdominal pain, blood in stool, constipation, diarrhea, nausea and vomiting.   Endocrine: Negative for cold intolerance and heat intolerance.   Genitourinary:  Negative for dysuria, frequency and hematuria.   Musculoskeletal:  Negative for arthralgias and myalgias.   Skin:  Negative for color change, pallor and rash.   Neurological:  Positive for weakness. Negative for numbness.   Hematological:  Does not bruise/bleed easily.   Psychiatric/Behavioral:  Negative for agitation, hallucinations and suicidal ideas.        Physical Exam  Physical Exam  Constitutional:       Appearance: She is well-developed.   HENT:      Head: Normocephalic and atraumatic.   Eyes:      Extraocular Movements: EOM normal.      Pupils: Pupils are equal, round, and reactive to light.   Neck:      Vascular: No JVD.      Trachea: No tracheal deviation.   Cardiovascular:      Rate and Rhythm: Normal rate and regular rhythm.   Pulmonary:      Effort: Pulmonary effort is normal. No tachypnea, accessory muscle usage or respiratory distress.      Breath sounds: Rales present.   Abdominal:      General: There is no distension.   Musculoskeletal:      Cervical back: Normal range of motion. No rigidity or tenderness.      Right lower leg: Normal.      Left lower leg: Normal.   Lymphadenopathy:      Cervical: No cervical adenopathy.   Skin:     General: Skin is warm.      Capillary Refill: Capillary refill takes less than 2 seconds.   Neurological:      General: No focal deficit present.      Mental Status: She is alert and oriented to person, place, and time.      Cranial Nerves: No cranial nerve deficit.      Sensory: No sensory deficit.      Motor: No weakness.   Psychiatric:         Mood and Affect: Mood and affect normal.         Behavior: Behavior normal.         Vital Signs  ED Triage Vitals [08/30/24 1416]   Temperature Pulse Respirations Blood Pressure SpO2   97.6 °F (36.4 °C) 78 16 121/58 94 %       Temp Source Heart Rate Source Patient Position - Orthostatic VS BP Location FiO2 (%)   Oral Monitor Lying Right arm --      Pain Score       6           Vitals:    08/30/24 1416 08/30/24 1536 08/30/24 1635   BP: 121/58 118/62 109/60   Pulse: 78 77 79   Patient Position - Orthostatic VS: Lying Lying Lying         Visual Acuity      ED Medications  Medications   multi-electrolyte (ISOLYTE-S PH 7.4) bolus 1,000 mL (0 mL Intravenous Stopped 8/30/24 1632)   aspirin chewable tablet 324 mg (324 mg Oral Given 8/30/24 1633)   furosemide (LASIX) injection 40 mg (40 mg Intravenous Given 8/30/24 1647)       Diagnostic Studies  Results Reviewed       Procedure Component Value Units Date/Time    TSH, 3rd generation with Free T4 reflex [[id number]]  (Normal) Collected: 08/30/24 1528    Lab Status: Final result Specimen: Blood from Arm, Right Updated: 08/30/24 1610     TSH 3RD GENERATON 1.798 uIU/mL     HS Troponin I 4hr [[id number]]     Lab Status: No result Specimen: Blood     B-Type Natriuretic Peptide(BNP) [[id number]]  (Abnormal) Collected: 08/30/24 1528    Lab Status: Final result Specimen: Blood from Arm, Right Updated: 08/30/24 1600     BNP 3,596 pg/mL     HS Troponin 0hr (reflex protocol) [[id number]]  (Abnormal) Collected: 08/30/24 1528    Lab Status: Final result Specimen: Blood from Arm, Right Updated: 08/30/24 1559     hs TnI 0hr 18,297 ng/L     HS Troponin I 2hr [[id number]]     Lab Status: No result Specimen: Blood     Comprehensive metabolic panel [[id number]]  (Abnormal) Collected: 08/30/24 1528    Lab Status: Final result Specimen: Blood from Arm, Right Updated: 08/30/24 1553     Sodium 131 mmol/L      Potassium 3.9 mmol/L      Chloride 90 mmol/L      CO2 23 mmol/L      ANION GAP 18 mmol/L      BUN 50 mg/dL      Creatinine 4.56 mg/dL      Glucose 213 mg/dL      Calcium 8.4 mg/dL       U/L      ALT 91 U/L      Alkaline Phosphatase 93 U/L      Total Protein 6.8 g/dL      Albumin 3.6 g/dL      Total Bilirubin 0.67  mg/dL      eGFR 9 ml/min/1.73sq m     Narrative:      National Kid[employer] guidelines for Chronic Kidney Disease (CKD):     Stage 1 with normal or high GFR (GFR > 90 mL/min/1.73 square meters)    Stage 2 Mild CKD (GFR = 60-89 mL/min/1.73 square meters)    Stage 3A Moderate CKD (GFR = 45-59 mL/min/1.73 square meters)    Stage 3B Moderate CKD (GFR = 30-44 mL/min/1.73 square meters)    Stage 4 Severe CKD (GFR = 15-29 mL/min/1.73 square meters)    Stage 5 End Stage CKD (GFR <15 mL/min/1.73 square meters)  Note: GFR calculation is accurate only with a steady state creatinine    CBC and differential [[id number]]  (Abnormal) Collected: 08/30/24 1528    Lab Status: Final result Specimen: Blood from Arm, Right Updated: 08/30/24 1535     WBC 8.02 Thousand/uL      RBC 2.86 Million/uL      Hemoglobin 9.6 g/dL      Hematocrit 28.1 %      MCV 98 fL      MCH 33.6 pg      MCHC 34.2 g/dL      RDW 13.3 %      MPV 9.9 fL      Platelets 225 Thousands/uL      nRBC 0 /100 WBCs      Segmented % 76 %      Immature Grans % 1 %      Lymphocytes % 15 %      Monocytes % 8 %      Eosinophils Relative 0 %      Basophils Relative 0 %      Absolute Neutrophils 6.10 Thousands/µL      Absolute Immature Grans 0.07 Thousand/uL      Absolute Lymphocytes 1.19 Thousands/µL      Absolute Monocytes 0.65 Thousand/µL      Eosinophils Absolute 0.00 Thousand/µL      Basophils Absolute 0.01 Thousands/µL                    XR chest 2 views   ED Interpretation by Doyle Chris MD (08/30 8546)   Independently reviewed: pulmonary interstitial edema.      Final Result by Jaime Mckeon MD (08/30 3792)      Small bilateral pleural effusions and mild to moderate pulmonary interstitial edema.            Workstation performed: [id number]                    Procedures  ECG 12 Lead Documentation Only    Date/Time: 8/30/2024 3:50 PM    Performed by: Doyle Chris MD  Authorized by: Doyle Chris MD    ECG reviewed by me, the ED Provider: yes     Patient location:  ED  Previous ECG:     Previous ECG:  Compared to current    Comparison ECG info:  1/17/23    Similarity:  No change  Rate:     ECG rate:  75    ECG rate assessment: normal    Rhythm:     Rhythm: sinus rhythm    Ectopy:     Ectopy: none    QRS:     QRS axis:  Normal    QRS intervals:  Normal  Conduction:     Conduction: abnormal      Abnormal conduction: complete LBBB    ST segments:     ST segments:  Normal  T waves:     T waves: normal    CriticalCare Time    Date/Time: 8/30/2024 4:34 PM    Performed by: Doyle Chris MD  Authorized by: Doyle Chris MD    Critical care provider statement:     Critical care time (minutes):  35    Critical care time was exclusive of:  Separately billable procedures and treating other patients and teaching time    Critical care was necessary to treat or prevent imminent or life-threatening deterioration of the following conditions:  Respiratory failure and cardiac failure    Critical care was time spent personally by me on the following activities:  Blood draw for specimens, obtaining history from patient or surrogate, development of treatment plan with patient or surrogate, discussions with consultants, evaluation of patient's response to treatment, examination of patient, interpretation of cardiac output measurements, ordering and performing treatments and interventions, ordering and review of laboratory studies, ordering and review of radiographic studies, re-evaluation of patient's condition and review of old charts           ED Course  ED Course as of 08/30/24 1720   Fri Aug 30, 2024   1627 hs TnI 0hr(!): 18,297  Nonischemic EKG, no active chest pain, will tx with asa, trend   1627 Hemoglobin(!): 9.6   1627 Creatinine(!): 4.56  Acute on chronic                                               Medical Decision Making  Shortness of breath, weakness-no history exam finding suggest central sternal system pathology and CT of head is not indicated.  No history  exam finding suggest pulmonary embolism and workup is not indicated.  Will do cardiac workup throughout ACS, pneumonia, pneumothorax, congestive heart failure, dysrhythmia, electro abnormality, anemia, urine dip throughout UTI, admit.    Amount and/or Complexity of Data Reviewed  Labs: ordered. Decision-making details documented in ED Course.  Radiology: ordered.    Risk  OTC drugs.  Prescription drug management.  Decision regarding hospitalization.                 Disposition  Final diagnoses:   Acute congestive heart failure (HCC)   Elevated troponin   Anemia   Hypoxia   ALFRED (acute kidney injury) (HCC)   CKD (chronic kidney disease)     Time reflects when diagnosis was documented in both MDM as applicable and the Disposition within this note       Time User Action Codes Description Comment    8/30/2024  5:01 PM Doyle Chris [I50.9] Acute congestive heart failure (HCC)     8/30/2024  5:01 PM Doyle Chris Add [R79.89] Elevated troponin     8/30/2024  5:01 PM Doyle Chris Add [D64.9] Anemia     8/30/2024  5:01 PM Doyle Chris Add [R09.02] Hypoxia     8/30/2024  5:02 PM Doyle Chris Add [N17.9] ALFRED (acute kidney injury) (HCC)     8/30/2024  5:02 PM Doyle Chris Add [N18.9] CKD (chronic kidney disease)           ED Disposition       ED Disposition   Admit    Condition   Stable    Date/Time   Fri Aug 30, 2024  4:28 PM    Comment   --             Follow-up Information    None         Patient's Medications   Discharge Prescriptions    No medications on file       No discharge procedures on file.    PDMP Review         Value Time User    PDMP Reviewed  Yes 8/9/2024  5:07 PM CHERELLE Franklin            ED Provider  Electronically Signed by             Doyle Chris MD  08/30/24 3102

## 2024-08-30 NOTE — PROGRESS NOTES
I called the patient to see how she was feeling (refer to 8/28 note).  The patient reports she is no better and is very weak.  She noted she only ate a 1/2 banana yesterday and 1/2 today.  The patient stated she was unable to go up her stairs and needed the help of 2 men to assist.  I offered to call the ambulance for her but she declined.  She agreed for me to call her daughter.    I called Yenny and encouraged her to take the patient to the [facility].  Yenny notes she is packing and getting herself/patient ready to relocate and is understandably stressed.  I informed her the patient needs medical attention; she agreed to take her to the hospital.    I called [facility] to cancel the patient's mammo that was scheduled for today.    I will continue to follow.

## 2024-08-30 NOTE — ASSESSMENT & PLAN NOTE
Wt Readings from Last 3 Encounters:   08/30/24 117 kg (257 lb 0.9 oz)   08/19/24 108 kg (237 lb)   08/02/24 109 kg (240 lb)     - pt with hx of HFpEF (last EF = 38% in 2022)  -Patient reports worsening dyspnea on exertion; has recent 20 pound weight gain  - BNP in ED is 3596; no baseline on record  - Start Lasix  bid  - Strict intake/output  - Daily weights  - supplementary O2 to maintain sats >94%  - Echocardiogram  - pt counselled regarding Na and H2O ingestion and their contribution to CHF symptoms  -Consult to nephrology to assess need for expedited dialysis to remove fluid

## 2024-08-30 NOTE — ASSESSMENT & PLAN NOTE
Blood pressure well-controlled on admission  Continue home dose losartan; will hold hydralazine 25 mg 3 times daily for now as BP controlled

## 2024-08-30 NOTE — ASSESSMENT & PLAN NOTE
History of coronary artery disease with multiple coronary artery stents  Denies chest pain and appears comfortable on exam; but does have highly elevated troponin  Monitor on telemetry; EKG with no acute ischemic findings  Consult to cardiology; continue aspirin, Plavix, and statin; will initiate heparin drip, has been aspirin loaded

## 2024-08-31 ENCOUNTER — APPOINTMENT (INPATIENT)
Dept: DIALYSIS | Facility: HOSPITAL | Age: 64
DRG: 981 | End: 2024-08-31
Payer: COMMERCIAL

## 2024-08-31 DIAGNOSIS — Z79.4 TYPE 2 DIABETES MELLITUS WITH DIABETIC POLYNEUROPATHY, WITH LONG-TERM CURRENT USE OF INSULIN (HCC): ICD-10-CM

## 2024-08-31 DIAGNOSIS — E11.42 TYPE 2 DIABETES MELLITUS WITH DIABETIC POLYNEUROPATHY, WITH LONG-TERM CURRENT USE OF INSULIN (HCC): ICD-10-CM

## 2024-08-31 DIAGNOSIS — I10 PRIMARY HYPERTENSION: ICD-10-CM

## 2024-08-31 LAB
ALBUMIN SERPL BCG-MCNC: 3.5 G/DL (ref 3.5–5)
ALP SERPL-CCNC: 85 U/L (ref 34–104)
ALT SERPL W P-5'-P-CCNC: 140 U/L (ref 7–52)
ANION GAP SERPL CALCULATED.3IONS-SCNC: 15 MMOL/L (ref 4–13)
APTT PPP: 45 SECONDS (ref 23–34)
APTT PPP: 65 SECONDS (ref 23–34)
APTT PPP: >210 SECONDS (ref 23–34)
AST SERPL W P-5'-P-CCNC: 223 U/L (ref 13–39)
BASOPHILS # BLD AUTO: 0.02 THOUSANDS/ÂΜL (ref 0–0.1)
BASOPHILS NFR BLD AUTO: 0 % (ref 0–1)
BILIRUB SERPL-MCNC: 0.56 MG/DL (ref 0.2–1)
BUN SERPL-MCNC: 55 MG/DL (ref 5–25)
CALCIUM SERPL-MCNC: 8 MG/DL (ref 8.4–10.2)
CHLORIDE SERPL-SCNC: 92 MMOL/L (ref 96–108)
CO2 SERPL-SCNC: 25 MMOL/L (ref 21–32)
CREAT SERPL-MCNC: 5.19 MG/DL (ref 0.6–1.3)
EOSINOPHIL # BLD AUTO: 0.08 THOUSAND/ÂΜL (ref 0–0.61)
EOSINOPHIL NFR BLD AUTO: 1 % (ref 0–6)
ERYTHROCYTE [DISTWIDTH] IN BLOOD BY AUTOMATED COUNT: 13.2 % (ref 11.6–15.1)
ERYTHROCYTE [DISTWIDTH] IN BLOOD BY AUTOMATED COUNT: 13.2 % (ref 11.6–15.1)
EST. AVERAGE GLUCOSE BLD GHB EST-MCNC: 140 MG/DL
FLUAV RNA RESP QL NAA+PROBE: NEGATIVE
FLUBV RNA RESP QL NAA+PROBE: NEGATIVE
GFR SERPL CREATININE-BSD FRML MDRD: 8 ML/MIN/1.73SQ M
GLUCOSE SERPL-MCNC: 113 MG/DL (ref 65–140)
GLUCOSE SERPL-MCNC: 157 MG/DL (ref 65–140)
GLUCOSE SERPL-MCNC: 170 MG/DL (ref 65–140)
GLUCOSE SERPL-MCNC: 206 MG/DL (ref 65–140)
HBA1C MFR BLD: 6.5 %
HCT VFR BLD AUTO: 24.8 % (ref 34.8–46.1)
HCT VFR BLD AUTO: 24.9 % (ref 34.8–46.1)
HGB BLD-MCNC: 8.3 G/DL (ref 11.5–15.4)
HGB BLD-MCNC: 8.7 G/DL (ref 11.5–15.4)
IMM GRANULOCYTES # BLD AUTO: 0.06 THOUSAND/UL (ref 0–0.2)
IMM GRANULOCYTES NFR BLD AUTO: 1 % (ref 0–2)
LYMPHOCYTES # BLD AUTO: 2.15 THOUSANDS/ÂΜL (ref 0.6–4.47)
LYMPHOCYTES NFR BLD AUTO: 29 % (ref 14–44)
MAGNESIUM SERPL-MCNC: 2.7 MG/DL (ref 1.9–2.7)
MCH RBC QN AUTO: 33.3 PG (ref 26.8–34.3)
MCH RBC QN AUTO: 34.3 PG (ref 26.8–34.3)
MCHC RBC AUTO-ENTMCNC: 33.5 G/DL (ref 31.4–37.4)
MCHC RBC AUTO-ENTMCNC: 34.9 G/DL (ref 31.4–37.4)
MCV RBC AUTO: 100 FL (ref 82–98)
MCV RBC AUTO: 98 FL (ref 82–98)
MONOCYTES # BLD AUTO: 0.54 THOUSAND/ÂΜL (ref 0.17–1.22)
MONOCYTES NFR BLD AUTO: 7 % (ref 4–12)
NEUTROPHILS # BLD AUTO: 4.46 THOUSANDS/ÂΜL (ref 1.85–7.62)
NEUTS SEG NFR BLD AUTO: 62 % (ref 43–75)
NRBC BLD AUTO-RTO: 0 /100 WBCS
PHOSPHATE SERPL-MCNC: 5.2 MG/DL (ref 2.3–4.1)
PLATELET # BLD AUTO: 206 THOUSANDS/UL (ref 149–390)
PLATELET # BLD AUTO: 226 THOUSANDS/UL (ref 149–390)
PMV BLD AUTO: 9.5 FL (ref 8.9–12.7)
PMV BLD AUTO: 9.7 FL (ref 8.9–12.7)
POTASSIUM SERPL-SCNC: 3.7 MMOL/L (ref 3.5–5.3)
PROT SERPL-MCNC: 6.2 G/DL (ref 6.4–8.4)
RBC # BLD AUTO: 2.49 MILLION/UL (ref 3.81–5.12)
RBC # BLD AUTO: 2.54 MILLION/UL (ref 3.81–5.12)
RSV RNA RESP QL NAA+PROBE: NEGATIVE
SARS-COV-2 RNA RESP QL NAA+PROBE: POSITIVE
SODIUM SERPL-SCNC: 132 MMOL/L (ref 135–147)
WBC # BLD AUTO: 7.31 THOUSAND/UL (ref 4.31–10.16)
WBC # BLD AUTO: 8.21 THOUSAND/UL (ref 4.31–10.16)

## 2024-08-31 PROCEDURE — 80053 COMPREHEN METABOLIC PANEL: CPT | Performed by: INTERNAL MEDICINE

## 2024-08-31 PROCEDURE — XW033E5 INTRODUCTION OF REMDESIVIR ANTI-INFECTIVE INTO PERIPHERAL VEIN, PERCUTANEOUS APPROACH, NEW TECHNOLOGY GROUP 5: ICD-10-PCS | Performed by: STUDENT IN AN ORGANIZED HEALTH CARE EDUCATION/TRAINING PROGRAM

## 2024-08-31 PROCEDURE — 83735 ASSAY OF MAGNESIUM: CPT | Performed by: INTERNAL MEDICINE

## 2024-08-31 PROCEDURE — 84100 ASSAY OF PHOSPHORUS: CPT | Performed by: INTERNAL MEDICINE

## 2024-08-31 PROCEDURE — 82948 REAGENT STRIP/BLOOD GLUCOSE: CPT

## 2024-08-31 PROCEDURE — 99222 1ST HOSP IP/OBS MODERATE 55: CPT | Performed by: INTERNAL MEDICINE

## 2024-08-31 PROCEDURE — 85730 THROMBOPLASTIN TIME PARTIAL: CPT | Performed by: INTERNAL MEDICINE

## 2024-08-31 PROCEDURE — 99223 1ST HOSP IP/OBS HIGH 75: CPT | Performed by: INTERNAL MEDICINE

## 2024-08-31 PROCEDURE — 85025 COMPLETE CBC W/AUTO DIFF WBC: CPT | Performed by: INTERNAL MEDICINE

## 2024-08-31 PROCEDURE — 99233 SBSQ HOSP IP/OBS HIGH 50: CPT | Performed by: INTERNAL MEDICINE

## 2024-08-31 PROCEDURE — 0241U HB NFCT DS VIR RESP RNA 4 TRGT: CPT | Performed by: INTERNAL MEDICINE

## 2024-08-31 PROCEDURE — 97163 PT EVAL HIGH COMPLEX 45 MIN: CPT

## 2024-08-31 PROCEDURE — 83036 HEMOGLOBIN GLYCOSYLATED A1C: CPT | Performed by: INTERNAL MEDICINE

## 2024-08-31 PROCEDURE — 97166 OT EVAL MOD COMPLEX 45 MIN: CPT

## 2024-08-31 PROCEDURE — 85027 COMPLETE CBC AUTOMATED: CPT | Performed by: INTERNAL MEDICINE

## 2024-08-31 RX ORDER — INSULIN LISPRO 100 [IU]/ML
1-6 INJECTION, SOLUTION INTRAVENOUS; SUBCUTANEOUS
Status: DISCONTINUED | OUTPATIENT
Start: 2024-08-31 | End: 2024-09-06 | Stop reason: HOSPADM

## 2024-08-31 RX ORDER — SEVELAMER HYDROCHLORIDE 800 MG/1
1600 TABLET, FILM COATED ORAL
Status: DISCONTINUED | OUTPATIENT
Start: 2024-08-31 | End: 2024-09-06 | Stop reason: HOSPADM

## 2024-08-31 RX ORDER — ATORVASTATIN CALCIUM 80 MG/1
80 TABLET, FILM COATED ORAL DAILY
Status: DISCONTINUED | OUTPATIENT
Start: 2024-09-01 | End: 2024-09-06 | Stop reason: HOSPADM

## 2024-08-31 RX ADMIN — HEPARIN SODIUM 13.1 UNITS/KG/HR: 10000 INJECTION, SOLUTION INTRAVENOUS at 19:19

## 2024-08-31 RX ADMIN — ATORVASTATIN CALCIUM 40 MG: 40 TABLET, FILM COATED ORAL at 08:42

## 2024-08-31 RX ADMIN — CLOPIDOGREL BISULFATE 75 MG: 75 TABLET ORAL at 08:43

## 2024-08-31 RX ADMIN — OXYCODONE HYDROCHLORIDE 5 MG: 5 TABLET ORAL at 19:29

## 2024-08-31 RX ADMIN — INSULIN LISPRO 1 UNITS: 100 INJECTION, SOLUTION INTRAVENOUS; SUBCUTANEOUS at 22:18

## 2024-08-31 RX ADMIN — ISOSORBIDE MONONITRATE 30 MG: 30 TABLET, EXTENDED RELEASE ORAL at 17:09

## 2024-08-31 RX ADMIN — ALLOPURINOL 200 MG: 100 TABLET ORAL at 08:42

## 2024-08-31 RX ADMIN — LOSARTAN POTASSIUM 25 MG: 25 TABLET, FILM COATED ORAL at 08:43

## 2024-08-31 RX ADMIN — FUROSEMIDE 120 MG: 10 INJECTION, SOLUTION INTRAVENOUS at 08:42

## 2024-08-31 RX ADMIN — OLANZAPINE 5 MG: 5 TABLET, FILM COATED ORAL at 22:18

## 2024-08-31 RX ADMIN — PANTOPRAZOLE SODIUM 40 MG: 40 TABLET, DELAYED RELEASE ORAL at 05:53

## 2024-08-31 RX ADMIN — SEVELAMER HYDROCHLORIDE 1600 MG: 800 TABLET ORAL at 17:09

## 2024-08-31 RX ADMIN — HEPARIN SODIUM 2000 UNITS: 1000 INJECTION INTRAVENOUS; SUBCUTANEOUS at 07:21

## 2024-08-31 RX ADMIN — CITALOPRAM HYDROBROMIDE 40 MG: 20 TABLET ORAL at 08:43

## 2024-08-31 RX ADMIN — DOCUSATE SODIUM 100 MG: 100 CAPSULE, LIQUID FILLED ORAL at 17:09

## 2024-08-31 RX ADMIN — MIDODRINE HYDROCHLORIDE 5 MG: 5 TABLET ORAL at 15:42

## 2024-08-31 RX ADMIN — ASPIRIN 81 MG: 81 TABLET, COATED ORAL at 08:42

## 2024-08-31 RX ADMIN — FUROSEMIDE 120 MG: 10 INJECTION, SOLUTION INTRAVENOUS at 17:10

## 2024-08-31 RX ADMIN — LEVOTHYROXINE SODIUM 50 MCG: 50 TABLET ORAL at 06:11

## 2024-08-31 RX ADMIN — SEVELAMER HYDROCHLORIDE 800 MG: 800 TABLET, FILM COATED ORAL at 08:43

## 2024-08-31 RX ADMIN — INSULIN LISPRO 2 UNITS: 100 INJECTION, SOLUTION INTRAVENOUS; SUBCUTANEOUS at 13:00

## 2024-08-31 RX ADMIN — REMDESIVIR 200 MG: 100 INJECTION, POWDER, LYOPHILIZED, FOR SOLUTION INTRAVENOUS at 18:16

## 2024-08-31 RX ADMIN — SENNOSIDES AND DOCUSATE SODIUM 1 TABLET: 50; 8.6 TABLET ORAL at 08:43

## 2024-08-31 RX ADMIN — DOCUSATE SODIUM 100 MG: 100 CAPSULE, LIQUID FILLED ORAL at 08:44

## 2024-08-31 RX ADMIN — INSULIN GLARGINE 20 UNITS: 100 INJECTION, SOLUTION SUBCUTANEOUS at 08:43

## 2024-08-31 NOTE — PROGRESS NOTES
[facility]  Name: Dee Dee Shaffer I  MRN: [id number]  Unit/Bed#: E4 -01 I Date of Admission: 8/30/2024   Date of Service: 8/31/2024 I Hospital Day: 1    Assessment & Plan   * Acute decompensated heart failure (HCC)  Assessment & Plan  Wt Readings from Last 3 Encounters:   08/31/24 116 kg (256 lb 13.4 oz)   08/19/24 108 kg (237 lb)   08/02/24 109 kg (240 lb)     Patient reports generalized weakness.  Suspect volume overload secondary to increased fluid intake  Continue Lasix 120 twice daily  Volume management with hemodialysis  Given sick contact and generalized weakness will swab for COVID            Hypertension  Assessment & Plan  Continue home BP meds    Chronic obstructive pulmonary disease, unspecified COPD type ([facility])  Assessment & Plan  Not currently in exacerbation    Coronary artery disease with stable angina pectoris ([facility])  Assessment & Plan  History of coronary artery disease with multiple coronary artery stents  Significantly elevated troponin without chest pain  Suspect type II non-MI related troponin elevation in setting of acute heart failure  Continue medical management with Plavix, statin, heparin drip, Imdur  Cardiology consultation    Mixed hyperlipidemia  Assessment & Plan  Continue statin therapy    Anemia due to chronic kidney disease, on chronic dialysis ([facility])  Assessment & Plan  Continue to monitor, transfuse as needed    Neurogenic bladder  Assessment & Plan  Chronic Camargo catheter    ESRD (end stage renal disease) ([facility])  Assessment & Plan  Lab Results   Component Value Date    EGFR 8 08/31/2024    EGFR 9 08/30/2024    EGFR 13 04/10/2024    CREATININE 5.19 (H) 08/31/2024    CREATININE 4.56 (H) 08/30/2024    CREATININE 3.51 (H) 04/10/2024     Appreciate nephrology recommendations    Type 2 diabetes mellitus with chronic kidney disease on chronic dialysis, with long-term current use of insulin ([facility])  Assessment & Plan  Continue basal bolus  regimen plus sliding scale insulin             VTE Pharmacologic Prophylaxis: heparin     Patient Centered Rounds:  Patient care rounds were performed with nursing    Discussions with Specialists or Other Care Team Provider: Independently reviewed case with nephrology team    Education and Discussions with Family / Patient: Patient at the bedside    Time Spent for Care: I have spent a total time of 51 minutes on 24 in caring for this patient including Diagnostic results, Risks and benefits of tx options, Instructions for management, Patient and family education, Importance of tx compliance, Impressions, Counseling / Coordination of care, Reviewing / ordering tests, medicine, procedures  , Obtaining or reviewing history  , and Communicating with other healthcare professionals .      Current Length of Stay: 1 day(s)    Current Patient Status: Inpatient   Certification Statement: The patient will continue to require additional inpatient hospital stay due to management of volume overload, elevated troponin    Discharge Plan: Suspect 48 to 72 hours    Code Status: Level 1 - Full Code      Subjective:   Patient seen and evaluated at bedside.  She reports still feeling fatigued.  Denies any fevers or chills.  Reports that her daughter was recently in the hospital and did have some upper respiratory tract symptoms.    Objective:     Vitals:   Temp (24hrs), Av.5 °F (36.4 °C), Min:97 °F (36.1 °C), Max:97.7 °F (36.5 °C)    Temp:  [97 °F (36.1 °C)-97.7 °F (36.5 °C)] 97 °F (36.1 °C)  HR:  [68-82] 71  Resp:  [16-18] 18  BP: (108-133)/(57-68) 133/63  SpO2:  [89 %-99 %] 95 %  Body mass index is 40.23 kg/m².     Input and Output Summary (last 24 hours):       Intake/Output Summary (Last 24 hours) at 2024 1039  Last data filed at 2024 0801  Gross per 24 hour   Intake 120 ml   Output --   Net 120 ml       Physical Exam:     Physical Exam  Vitals reviewed.   Constitutional:       General: She is not in acute  distress.     Appearance: She is well-developed. She is not ill-appearing, toxic-appearing or diaphoretic.   HENT:      Head: Normocephalic and atraumatic.      Mouth/Throat:      Mouth: Mucous membranes are moist.      Pharynx: No oropharyngeal exudate.   Eyes:      General: No scleral icterus.     Extraocular Movements: Extraocular movements intact.      Conjunctiva/sclera: Conjunctivae normal.   Cardiovascular:      Rate and Rhythm: Normal rate and regular rhythm.      Heart sounds: Normal heart sounds.   Pulmonary:      Effort: Pulmonary effort is normal. No respiratory distress.      Breath sounds: Rales present. No wheezing.   Abdominal:      General: There is no distension.      Palpations: Abdomen is soft.      Tenderness: There is no abdominal tenderness. There is no guarding or rebound.   Musculoskeletal:         General: No swelling, tenderness or deformity.      Cervical back: Normal range of motion.      Right lower leg: Edema present.      Left lower leg: Edema present.   Skin:     General: Skin is warm and dry.   Neurological:      General: No focal deficit present.      Mental Status: She is alert. Mental status is at baseline.   Psychiatric:         Mood and Affect: Mood normal.         Behavior: Behavior normal.         Thought Content: Thought content normal.         Judgment: Judgment normal.         Additional Data:     Labs: I have reviewed pertinent results     Results from last 7 days   Lab Units 08/31/24  0545   WBC Thousand/uL 7.31   HEMOGLOBIN g/dL 8.3*   HEMATOCRIT % 24.8*   PLATELETS Thousands/uL 206   SEGS PCT % 62   LYMPHO PCT % 29   MONO PCT % 7   EOS PCT % 1     Results from last 7 days   Lab Units 08/31/24  0545   SODIUM mmol/L 132*   POTASSIUM mmol/L 3.7   CHLORIDE mmol/L 92*   CO2 mmol/L 25   BUN mg/dL 55*   CREATININE mg/dL 5.19*   ANION GAP mmol/L 15*   CALCIUM mg/dL 8.0*   ALBUMIN g/dL 3.5   TOTAL BILIRUBIN mg/dL 0.56   ALK PHOS U/L 85   ALT U/L 140*   AST U/L 223*   GLUCOSE  RANDOM mg/dL 170*     Results from last 7 days   Lab Units 08/30/24  2236   INR  1.37*                     Imaging: I have reviewed pertinent imaging       Recent Cultures (last 7 days):           Last 24 Hours Medication List:   Current Facility-Administered Medications   Medication Dose Route Frequency Provider Last Rate    acetaminophen  650 mg Oral Q4H PRN Ismael Rivas MD      allopurinol  200 mg Oral Daily Ismael Rivas MD      aspirin  81 mg Oral Daily Ismael Rivas MD      atorvastatin  40 mg Oral Daily Ismael Rivas MD      calcitriol  0.5 mcg Oral Once per day on Monday Wednesday Friday Ismael Rivas MD      citalopram  40 mg Oral Daily Ismael Rivas MD      clopidogrel  75 mg Oral Daily Ismael Rivas MD      docusate sodium  100 mg Oral BID Ismael Rivas MD      ergocalciferol  50,000 Units Oral Once per day on Monday Wednesday Friday Ismael Rivas MD      furosemide  120 mg Intravenous BID Ismael Rivas MD      heparin (porcine)  3-20 Units/kg/hr (Order-Specific) Intravenous Titrated Ismael Rivas MD 13.1 Units/kg/hr (08/31/24 0718)    heparin (porcine)  2,000 Units Intravenous Q6H PRN Ismael Rivas MD      heparin (porcine)  4,000 Units Intravenous Q6H PRN Ismael Rivas MD      insulin glargine  20 Units Subcutaneous QAM Ismael Rivas MD      insulin lispro  1-6 Units Subcutaneous 4x Daily (AC & HS) Garcia Lopez DO      isosorbide mononitrate  30 mg Oral QPM Ismael Rivas MD      lactulose  20 g Oral Daily PRN Ismael Rivas MD      levothyroxine  50 mcg Oral Daily Before Breakfast Ismael Rivas MD      losartan  25 mg Oral Daily Ismael Rivas MD      midodrine  5 mg Oral Before Dialysis Ismael Rivas MD      OLANZapine  5 mg Oral HS Ismael Rivas MD      ondansetron  4 mg Intravenous Q6H PRN Ismael Rivas MD      oxyCODONE  10 mg Oral Q4H PRN Ismael Rivas MD      oxyCODONE  5 mg Oral Q4H PRN Ismael Rivas MD      pantoprazole  40 mg Oral Early Morning Ismael Rivas MD      senna-docusate sodium   1 tablet Oral Daily Ismael Rivas MD      sevelamer  800 mg Oral TID With Meals Ismael Rivas MD          Today, Patient Was Seen By: Garcia Lopez DO    ** Please Note: Dictation voice to text software may have been used in the creation of this document. **

## 2024-08-31 NOTE — PLAN OF CARE
Problem: OCCUPATIONAL THERAPY ADULT  Goal: Performs self-care activities at highest level of function for planned discharge setting.  See evaluation for individualized goals.  Description: Treatment Interventions: ADL retraining, Functional transfer training, UE strengthening/ROM, Endurance training, Patient/family training, Equipment evaluation/education, Compensatory technique education, Continued evaluation, Energy conservation, Activityengagement          See flowsheet documentation for full assessment, interventions and recommendations.   Note: Limitation: Decreased ADL status, Decreased UE strength, Decreased endurance, Decreased self-care trans, Decreased high-level ADLs  Prognosis: Good  Assessment: Pt is a 63 y.o. female seen for OT evaluation s/p adm to [facility] on 8/30/2024 w/ Acute decompensated heart failure. Comorbidities affecting pt’s functional performance include a significant PMH of CAD s/p coronary artery stenting, ESRD on HD, Anemia, Anxiety, Arthritis, CHF, CKD, HTN, HLD, type 2 diabetes. Pt with active OT orders and activity orders for Up and OOB as tolerated. Pt lives with dtr and dtr's S.O. in a one level house with 4-6 DOROTHEA. Pt reports her dtr is her paid FT caregiver. (-) . At baseline, pt required assist w/ ADLs (I w/ toileting), assist w/ IADLs, and Mod I for functional transfers/mobility w/ use of SPC. Pt reports use of Rollator x1 wk 2* weakness. (-) . Denies falls PTA. Upon evaluation, pt currently requires Supervision for UB ADLs, Min A for LB ADLs, Min A for toileting, and Supervision for functional mobility/transfers 2* the following deficits impacting occupational performance: decreased strength , decreased balance, decreased activity tolerance, limited functional reach, impaired sensation, and increased pain. These impairments, as well at pt’s personal factors of: DOROTHEA home environment, difficulty performing ADLs, difficulty performing transfers/mobility,  fall risk , functional decline , new O2 requirements, and increased reliance on DME  limit pt’s ability to safely engage in all baseline areas of occupation. Pt to continue to benefit from continued acute OT services during hospital stay to address defined deficits and to maximize level of functional independence in the following Occupational Performance areas: grooming, bathing/shower, toilet hygiene, dressing, health maintenance, functional mobility, community mobility, clothing management, and social participation. The patient's raw score on the -PAC Daily Activity Inpatient Short Form is 19. A raw score of greater than or equal to 19 suggests the patient may benefit from discharge to home. Please refer to the recommendation of the Occupational Therapist for safe discharge planning. OT will continue to follow pt 2-4x/wk to address the following goals to  w/in 10-14 days:     Rehab Resource Intensity Level, OT: III (Minimum Resource Intensity) (pending progress and stair trial w/ PT)

## 2024-08-31 NOTE — H&P
"[facility]  H&P  Name: Dee Dee Shaffer 63 y.o. female I MRN: [id number]  Unit/Bed#: E4 -01 I Date of Admission: 8/30/2024   Date of Service: 8/30/2024 I Hospital Day: 0      Assessment & Plan   Hypertension  Assessment & Plan  Blood pressure well-controlled on admission  Continue home dose losartan; will hold hydralazine 25 mg 3 times daily for now as BP controlled    Chronic obstructive pulmonary disease, unspecified COPD type ([facility])  Assessment & Plan  Patient currently 98% SpO2 on 2 L nasal cannula  Likely in acute decompensated heart failure  Do not suspect active COPD exacerbation, continue home medications    Coronary artery disease with stable angina pectoris ([facility])  Assessment & Plan  History of coronary artery disease with multiple coronary artery stents  Denies chest pain and appears comfortable on exam; but does have highly elevated troponin  Monitor on telemetry; EKG with no acute ischemic findings  Consult to cardiology; continue aspirin, Plavix, and statin; will initiate heparin drip, has been aspirin loaded      Mixed hyperlipidemia  Assessment & Plan  Continue statin therapy    Anemia due to chronic kidney disease, on chronic dialysis ([facility])  Assessment & Plan  Hemoglobin stable at 9.6, monitor and transfuse as needed    ESRD (end stage renal disease) ([facility])  Assessment & Plan  Lab Results   Component Value Date    EGFR 9 08/30/2024    EGFR 13 04/10/2024    EGFR 14 02/06/2023    CREATININE 4.56 (H) 08/30/2024    CREATININE 3.51 (H) 04/10/2024    CREATININE 3.28 (H) 02/06/2023     Patient with ESRD on dialysis Tuesdays, Thursdays, Saturdays  Consult to nephrology; supportive care    Type 2 diabetes mellitus with chronic kidney disease on chronic dialysis, with long-term current use of insulin ([facility])  Assessment & Plan  Lab Results   Component Value Date    HGBA1C 5.7 (H) 04/10/2024       No results for input(s): \"POCGLU\" in the last 72 hours.    Blood Sugar Average: " Last 72 hrs:    SSI; Subcutaneous Insulin Order Set  Blood Glucose checks TIDWM and QHS (Q6H for NPO patients)  Hold oral medications  Blood Glucose goal while inpatient is 140-180  Reduce basal insulin by 25-50% while inpatient  Consistent Carbohydrate Diet        * Acute decompensated heart failure (HCC)  Assessment & Plan  Wt Readings from Last 3 Encounters:   08/30/24 117 kg (257 lb 0.9 oz)   08/19/24 108 kg (237 lb)   08/02/24 109 kg (240 lb)     - pt with hx of HFpEF (last EF = 38% in 2022)  -Patient reports worsening dyspnea on exertion; has recent 20 pound weight gain  - BNP in ED is 3596; no baseline on record  - Start Lasix  bid  - Strict intake/output  - Daily weights  - supplementary O2 to maintain sats >94%  - Echocardiogram  - pt counselled regarding Na and H2O ingestion and their contribution to CHF symptoms  -Consult to nephrology to assess need for expedited dialysis to remove fluid                   VTE Pharmacologic Prophylaxis: VTE Score: 3 Moderate Risk (Score 3-4) - Pharmacological DVT Prophylaxis Ordered: heparin drip.  Code Status: Level 1 - Full Code   Discussion with family: Care plan discussed with patient who voiced understanding and agrees with recommendations.      Anticipated Length of Stay: Patient will be admitted on an inpatient basis with an anticipated length of stay of greater than 2 midnights secondary to acute decompensated heart failure.    Total Time Spent on Date of Encounter in care of patient: 60 mins. This time was spent on one or more of the following: performing physical exam; counseling and coordination of care; obtaining or reviewing history; documenting in the medical record; reviewing/ordering tests, medications or procedures; communicating with other healthcare professionals and discussing with patient's family/caregivers.    Chief Complaint: Weakness    History of Present Illness:  Dee Dee Shaffer is a 63 y.o. female with a PMH of coronary artery disease  (status post coronary artery stenting), ESRD (on HD T, T, S), anemia, hypertension, hyperlipidemia, type 2 diabetes who presents with worsening weakness and shortness of breath x 1 week.  Patient reports having worsening weakness to where she was unable to climb stairs.  Has been compliant with medications and dialysis; but in the ED appears to have approximately 17 pound weight gain over the last month.  Also noted to have highly elevated troponin of 18K although she denies any chest pain, abdominal pain, fever/chills, nausea/vomiting, back pain, headaches, visual changes.  Will be admitted for treatment of acute decompensated heart failure and elevated troponins.    Review of Systems:  Review of Systems   Constitutional:  Positive for activity change and unexpected weight change. Negative for appetite change, chills and fever.   HENT:  Negative for congestion, ear discharge, mouth sores and trouble swallowing.    Eyes:  Negative for photophobia, discharge and itching.   Respiratory:  Positive for shortness of breath. Negative for apnea, cough and choking.    Cardiovascular:  Negative for chest pain, palpitations and leg swelling.   Gastrointestinal:  Negative for abdominal distention, constipation, diarrhea, nausea and vomiting.   Endocrine: Negative.    Genitourinary:  Negative for difficulty urinating, dysuria, flank pain and hematuria.   Musculoskeletal:  Negative for arthralgias, back pain, gait problem and myalgias.   Neurological:  Positive for weakness. Negative for dizziness, syncope, facial asymmetry, light-headedness and headaches.   Psychiatric/Behavioral:  Negative for agitation, behavioral problems, confusion and suicidal ideas. The patient is not nervous/anxious.        Past Medical and Surgical History:   Past Medical History:   Diagnosis Date    Abnormal liver function     Anemia     Anxiety     Arthritis     CHF (congestive heart failure) (HCC)     Chronic kidney disease     stage 3    Chronic  narcotic dependence (HCC)     Chronic pain disorder     lower back, hands , neck and knees    Coronary artery disease     Depression     Diabetes mellitus (HCC)     Elevated troponin 02/11/2022    GERD (gastroesophageal reflux disease)     no meds at present    Heart murmur     murmur    Hyperlipidemia     Hypertension     Neurogenic bladder     Open toe wound 12/2020    right big toe open calus but is dry at present    PONV (postoperative nausea and vomiting)     Renal disorder     Shortness of breath     exertion    Skin ulcer of hand, limited to breakdown of skin (HCC) 02/25/2021    Sleep apnea     doesn't use cpap    Suprapubic catheter ([facility])     Urinary retention        Past Surgical History:   Procedure Laterality Date    AMPUTATION Left     2nd toe    ANGIOPLASTY  2017    5    BREAST EXCISIONAL BIOPSY Left     BREAST SURGERY      CARDIAC CATHETERIZATION      CARDIAC CATHETERIZATION N/A 07/01/2022    Procedure: Cardiac catheterization;  Surgeon: Minh Franz MD;  Location: [facility];  Service: Cardiology    CARDIAC CATHETERIZATION N/A 07/01/2022    Procedure: Cardiac pci;  Surgeon: Minh Franz MD;  Location: [facility];  Service: Cardiology    CARPAL TUNNEL RELEASE Bilateral     CERVICAL FUSION      HYSTERECTOMY  2008    IR AV FISTULAGRAM/GRAFTOGRAM  04/26/2023    IR AV FISTULAGRAM/GRAFTOGRAM  2/28/2024    IR SUPRAPUBIC CATHETER PLACEMENT  06/15/2021    IR SUPRAPUBIC CATHETER PLACEMENT  02/14/2023    IR TUNNELED CENTRAL LINE PLACEMENT  04/04/2023    IR TUNNELED DIALYSIS CATHETER PLACEMENT  07/15/2022    IR TUNNELED DIALYSIS CATHETER PLACEMENT  12/12/2022    IR TUNNELED DIALYSIS CATHETER REMOVAL  8/29/2023    KNEE SURGERY      OOPHORECTOMY  2008    TX ARTERIOVENOUS ANASTOMOSIS OPEN DIRECT Left 02/06/2023    Procedure: CREATION FISTULA  ARTERIOVENOUS (AV);  Surgeon: Minna Herbert DO;  Location: [facility];  Service: Vascular    TX EXC B9 LESION MRGN XCP SK TG S/N/H/F/G 3.1-4.0CM N/A  12/21/2020    Procedure: EXCISION SEBACEOUS CYST X 5 SCALP;  Surgeon: Minh Benton MD;  Location:  MAIN OR;  Service: General    MA REVJ OPN ARVEN FSTL W/O THRMBC DIAL GRF Left 7/3/2023    Procedure: REVISION AV FISTULA;  Surgeon: Minna Herbert DO;  Location: [facility];  Service: Vascular    TOE AMPUTATION Left     TRIGGER FINGER RELEASE Right     4th Finger       Meds/Allergies:  Prior to Admission medications    Medication Sig Start Date End Date Taking? Authorizing Provider   allopurinol (ZYLOPRIM) 100 mg tablet TAKE 2 TABLETS (200 MG) BY MOUTH DAILY 7/8/24  Yes CHERELLE Franklin   aspirin (Aspirin Low Dose) 81 mg EC tablet TAKE 1 TABLET (81 MG TOTAL) BY MOUTH DAILY 7/8/24  Yes CHERELLE Franklin   atorvastatin (LIPITOR) 40 mg tablet TAKE 1 TABLET (40 MG TOTAL) BY MOUTH DAILY 7/8/24  Yes CHERELLE Franklin   Blood Glucose Monitoring Suppl (OneTouch Verio Reflect) w/Device KIT Check blood sugars once daily. Please substitute with appropriate alternative as covered by patient's insurance. Dx: E11.65 7/5/23  Yes CHERELLE Franklin   calcitriol (ROCALTROL) 0.5 MCG capsule Take 1 capsule (0.5 mcg total) by mouth 3 (three) times a week 1/27/23  Yes Anisa Kuo MD   citalopram (CeleXA) 40 mg tablet TAKE 1 TABLET BY MOUTH DAILY 7/8/24  Yes CHERELLE Franklin   clopidogrel (PLAVIX) 75 mg tablet TAKE 1 TABLET (75 MG TOTAL) BY MOUTH DAILY 7/8/24  Yes CHERELLE Franklin   Continuous Blood Gluc Sensor (Dexcom G7 Sensor) Use 1 Device every 10 days 4/3/24  Yes CHERELLE Franklin   docusate sodium (COLACE) 100 mg capsule Take 1 capsule (100 mg total) by mouth 2 (two) times a day 4/19/24  Yes CHERELLE Franklin   ergocalciferol (VITAMIN D2) 50,000 units TAKE 1 CAPSULE (50,000 UNITS TOTAL) BY MOUTH 3 (THREE) TIMES A WEEK 8/7/24  Yes CHERELLE Franklin   glucose blood (OneTouch Verio) test strip Check blood sugars once daily. Please substitute  with appropriate alternative as covered by patient's insurance. Dx: E11.65 7/5/23  Yes CHERELLE Franklin   insulin degludec (Tresiba FlexTouch) 200 units/mL CONCENTRATED U-200 injection pen Inject 35 units once daily 4/3/24  Yes CHERELLE Franklin   insulin lispro (HumaLOG) 100 units/mL injection pen INJECT 20 UNITS UNDER SKIN THREE TIMES A DAY BEFORE MEALS 8/7/24  Yes Nicci Huffman MD   Insulin Pen Needle (BD Pen Needle Micro U/F) 32G X 6 MM MISC Inject under the skin 3 (three) times a day 4/19/24  Yes CHERELLE Franklin   Insulin Pen Needle (BD Pen Needle Joanne 2nd Gen) 32G X 4 MM MISC INJECT UNDER THE SKIN 4 (FOUR) TIMES A DAY USE AS DIRECTED 1/24/24  Yes CHERELLE Franklin   isosorbide mononitrate (IMDUR) 30 mg 24 hr tablet TAKE 1 TABLET (30 MG TOTAL) BY MOUTH EVERY EVENING 7/8/24  Yes CHERELLE Franklin   lactulose (CHRONULAC) 10 g/15 mL solution TAKE 30 ML (20 G TOTAL) BY MOUTH DAILY AS NEEDED (FOR CONSTIPATION) 7/6/24  Yes Anisa Kuo MD   levothyroxine 50 mcg tablet TAKE 1 TABLET (50 MCG TOTAL) BY MOUTH DAILY 7/8/24  Yes CHERELLE Franklin   losartan (COZAAR) 25 mg tablet TAKE 1 TABLET (25 MG TOTAL) BY MOUTH DAILY 7/8/24  Yes CHERELLE Franklin   midodrine (PROAMATINE) 5 mg tablet TAKE 1 TABLET (5 MG TOTAL) BY MOUTH AS NEEDED (IF SBP<100 WITH DIALYSIS) 9/22/23  Yes Courtney Rivera PA-C   nystatin (MYCOSTATIN) powder Apply topically 3 (three) times a day 11/29/23  Yes Chandni Pozo PA-C   OLANZapine (ZyPREXA) 5 mg tablet TAKE 1 TABLET (5 MG TOTAL) BY MOUTH DAILY AT BEDTIME 7/8/24  Yes Nicci Huffman MD   ondansetron (ZOFRAN) 4 mg tablet Take 1 tablet (4 mg total) by mouth every 8 (eight) hours as needed for nausea or vomiting 1/3/24  Yes Nicci Huffman MD   OneTouch Delica Lancets 33G MISC Check blood sugars once daily. Please substitute with appropriate alternative as covered by patient's insurance. Dx: E11.65 10/12/23  Yes Madhuri  CHERELLE Barbosa   oxyCODONE (ROXICODONE) 10 MG TABS Take 1 tablet (10 mg total) by mouth every 8 (eight) hours as needed for severe pain 30 days supply Max Daily Amount: 30 mg 8/9/24  Yes CHERELLE Franklin   Ozempic, 2 MG/DOSE, 8 MG/3ML injection pen INJECT 0.75 ML (2 MG TOTAL) UNDER THE SKIN EVERY 7 DAYS 8/7/24  Yes CHERELLE Franklin   senna-docusate sodium (Senexon-S) 8.6-50 mg per tablet TAKE 1 TABLET BY MOUTH DAILY 8/7/24  Yes CHERELLE Franklin   sevelamer carbonate (RENVELA) 800 mg tablet TAKE 2 TABLETS (1,600 MG TOTAL) BY MOUTH 3 (THREE) TIMES A DAY WITH MEALS 6/24/24  Yes Anisa Kuo MD   silver sulfadiazine (SILVADENE,SSD) 1 % cream Apply topically daily 1/5/23  Yes CHERELLE Franklin   amoxicillin (AMOXIL) 500 mg capsule Take 1 capsule (500 mg total) by mouth every 24 hours for 7 days 8/22/24 8/29/24  Ke Miller MD   furosemide (LASIX) 80 mg tablet TAKE 2 TABLETS (160 MG TOTAL) BY MOUTH DAILY 7/20/24   Anisa Kuo MD   hydrALAZINE (APRESOLINE) 25 mg tablet TAKE 1 TABLET (25 MG TOTAL) BY MOUTH 3 (THREE) TIMES A DAY HOLD SBP <100 7/8/24   CHERELLE Franklin   hydrALAZINE (APRESOLINE) 25 mg tablet TAKE 1 TABLET (25 MG TOTAL) BY MOUTH 3 (THREE) TIMES A DAY HOLD SBP <100 7/8/24   Nicci Huffman MD   ketoconazole (NIZORAL) 2 % cream  10/6/22   Historical Provider, MD   naloxone (NARCAN) 4 mg/0.1 mL nasal spray Administer 1 spray into a nostril. If breathing does not return to normal or if breathing difficulty resumes after 2-3 minutes, give another dose in the other nostril using a new spray.  Patient not taking: Reported on 8/30/2024 4/3/24   CHERELLE Franklin   nitroglycerin (NITROSTAT) 0.4 mg SL tablet Place 1 tablet (0.4 mg total) under the tongue every 5 (five) minutes as needed for chest pain  Patient not taking: Reported on 8/30/2024 2/9/23   CHERELLE Franklin   pantoprazole (PROTONIX) 40 mg tablet TAKE 1 TABLET (40 MG  "TOTAL) BY MOUTH DAILY 3/25/24   CHERELLE Franklin   oxygen gas Inhale 2 L/min continuous. Indications: Respiratory Failure 20  Historical Provider, MD   Torsemide 40 MG TABS Take 40 mg by mouth daily 23  Anisa Kuo MD     I have reviewed home medications using recent Epic encounter.    Allergies:   Allergies   Allergen Reactions    Latex Itching    Codeine GI Intolerance       Social History:  Marital Status:    Occupation:   Patient Pre-hospital Living Situation: Home  Patient Pre-hospital Level of Mobility: walks  Patient Pre-hospital Diet Restrictions: Diabetic  Substance Use History:   Social History     Substance and Sexual Activity   Alcohol Use Not Currently     Social History     Tobacco Use   Smoking Status Former    Current packs/day: 0.00    Average packs/day: 1 pack/day for 35.0 years (35.0 ttl pk-yrs)    Types: Cigarettes    Start date:     Quit date:     Years since quittin.6    Passive exposure: Past   Smokeless Tobacco Never     Social History     Substance and Sexual Activity   Drug Use Never       Family History:  Family History   Problem Relation Age of Onset    Stroke Father     Heart disease Father     No Known Problems Mother     No Known Problems Sister     No Known Problems Daughter     No Known Problems Maternal Grandmother     No Known Problems Maternal Grandfather     No Known Problems Paternal Grandmother     No Known Problems Paternal Grandfather     No Known Problems Maternal Aunt     No Known Problems Maternal Aunt     No Known Problems Maternal Aunt     No Known Problems Maternal Aunt     No Known Problems Maternal Aunt     No Known Problems Maternal Aunt     No Known Problems Paternal Aunt        Physical Exam:     Vitals:   Blood Pressure: 110/58 (24)  Pulse: 80 (24)  Temperature: 97.5 °F (36.4 °C) (24)  Temp Source: Temporal (24)  Respirations: 17 (24)  Height: 5' 7\" " (170.2 cm) (08/30/24 2112)  Weight - Scale: 117 kg (257 lb 15 oz) (08/30/24 2112)  SpO2: 96 % (08/30/24 2112)    Physical Exam  Vitals and nursing note reviewed.   Constitutional:       General: She is not in acute distress.     Appearance: She is well-developed.   HENT:      Head: Normocephalic and atraumatic.   Eyes:      Conjunctiva/sclera: Conjunctivae normal.   Cardiovascular:      Rate and Rhythm: Normal rate.   Pulmonary:      Effort: No respiratory distress.      Breath sounds: Normal breath sounds.      Comments: Lungs clear to auscultation  Abdominal:      Palpations: Abdomen is soft.      Tenderness: There is no abdominal tenderness.   Musculoskeletal:      Cervical back: Neck supple.      Right lower leg: Edema present.      Left lower leg: Edema present.   Skin:     General: Skin is warm and dry.   Neurological:      Mental Status: She is alert and oriented to person, place, and time.   Psychiatric:         Mood and Affect: Mood normal.            Additional Data:     Lab Results:  Results from last 7 days   Lab Units 08/30/24  1528   WBC Thousand/uL 8.02   HEMOGLOBIN g/dL 9.6*   HEMATOCRIT % 28.1*   PLATELETS Thousands/uL 225   SEGS PCT % 76*   LYMPHO PCT % 15   MONO PCT % 8   EOS PCT % 0     Results from last 7 days   Lab Units 08/30/24  1528   SODIUM mmol/L 131*   POTASSIUM mmol/L 3.9   CHLORIDE mmol/L 90*   CO2 mmol/L 23   BUN mg/dL 50*   CREATININE mg/dL 4.56*   ANION GAP mmol/L 18*   CALCIUM mg/dL 8.4   ALBUMIN g/dL 3.6   TOTAL BILIRUBIN mg/dL 0.67   ALK PHOS U/L 93   ALT U/L 91*   AST U/L 188*   GLUCOSE RANDOM mg/dL 213*             Lab Results   Component Value Date    HGBA1C 5.7 (H) 04/10/2024    HGBA1C 5.5 04/03/2024    HGBA1C 6.3 01/03/2024           Lines/Drains:  Invasive Devices       Central Venous Catheter Line  Duration             CVC Central Lines 12/12/22 Double 627 days              Peripheral Intravenous Line  Duration             Peripheral IV 08/30/24 Right Antecubital <1 day               Line  Duration             Hemodialysis AV Fistula 02/06/23 Left Forearm 571 days              Hemodialysis Catheter  Duration             HD Permanent Double Catheter 514 days              Drain  Duration             Suprapubic Catheter 16 Fr. 518 days    Suprapubic Catheter 16 Fr. 28 days                    Central Line:  Goal for removal: Port accessed. Will de-access as appropriate.           Imaging: Reviewed radiology reports from this admission including: chest xray  XR chest 2 views   ED Interpretation by Doyle Chris MD (08/30 9446)   Independently reviewed: pulmonary interstitial edema.      Final Result by Jaime Mckeon MD (08/30 6062)      Small bilateral pleural effusions and mild to moderate pulmonary interstitial edema.            Workstation performed: [id number]             EKG and Other Studies Reviewed on Admission:   EKG: NSR. HR 70.    ** Please Note: This note has been constructed using a voice recognition system. **

## 2024-08-31 NOTE — CONSULTS
Cardiology Consultation  MD Yonathan Xavier MD, FACC  Alexander Scherer DO, [facility]  MD Arnaud Jansen DO, [facility]  Irwin England DO, [facility]  ----------------------------------------------------------------  [address] [address]    Dee Dee Shaffer 63 y.o. female MRN: [id number]  Unit/Bed#: E4 -01 Encounter: [id number]      Reason for Consultation: Elevated troponin      ASSESSMENT:   Volume overload  Chronic HFrEF  LVEF 38%, moderate LVH, probable diastolic dysfunction, mild LA dilatation, AV sclerosis, trace AR, MV sclerosis, mild MR/TR, small pericardial effusion, June 2022  Elevated troponin with precordial chest pain  Ischemic cardiomyopathy  CAD   s/p cath w/ JANET-mLCx, moderate diffuse LAD and 95% small RCA (treated medically), July 2022  s/p PCI x5 to unknown vessels in 2012 and 2017 in [place]  Type 2 diabetes mellitus  Hypertension  Dyslipidemia  ESRD on HD  Neurogenic bladder with suprapubic catheter    PLAN:  Patient has some degree of volume overload with pulmonary edema and elevated troponins  Will treat as ACS pending further evaluation  Patient is now symptom-free and ECG shows chronic LBBB  Check 2D echocardiogram to assess cardiac structure and function  Heparin, aspirin, high intensity statin, Plavix  Continue losartan and isosorbide  Hemodialysis as per nephrology  Continue Lasix 120 mg IV twice daily  Strict I's/O's and daily weights  Keep potassium greater than 4 magnesium greater than 2  Further recommendations to follow testing    Signed: Alexander Scherer DO, FACC, FASE, MARIE, FACP      History of Present Illness:  Dee Dee Shaffer is a 63 y.o. female with CAD with multiple stents with most recent stent to mid circumflex in July 2022 with residual severe small RCA disease, hypertension, dyslipidemia, type 2 diabetes mellitus, ESRD on HD, HFrEF with ischemic cardiomyopathy and neurogenic bladder presented with  generalized fatigue and an episode of chest discomfort.  The patient had chest discomfort 1 to 2 days prior to presentation.  The chest discomfort was short-lived lasting several minutes and then resolving.  She did not think much of the chest discomfort at the time, but did experience significant fatigue afterward.  Due to her fatigue, she presented to [facility] for evaluation.  Chest x-ray was performed demonstrating mild to moderate interstitial pulmonary edema with small bilateral effusions.  Cardiac enzymes were obtained demonstrating a troponin of 18,297.  We were asked to see the patient regarding elevated troponin with volume overload.      Review of Systems:  Review of Systems   Constitutional: Negative for decreased appetite, fever, weight gain and weight loss.   HENT:  Negative for congestion and sore throat.    Eyes:  Negative for visual disturbance.   Cardiovascular:  Positive for chest pain and dyspnea on exertion. Negative for leg swelling, near-syncope and palpitations.   Respiratory:  Positive for shortness of breath. Negative for cough.    Hematologic/Lymphatic: Negative for bleeding problem.   Skin:  Negative for rash.   Musculoskeletal:  Negative for myalgias and neck pain.   Gastrointestinal:  Negative for abdominal pain and nausea.   Neurological:  Negative for light-headedness and weakness.   Psychiatric/Behavioral:  Negative for depression.          Past Medical History:   Diagnosis Date    Abnormal liver function     Anemia     Anxiety     Arthritis     CHF (congestive heart failure) (HCC)     Chronic kidney disease     stage 3    Chronic narcotic dependence (HCC)     Chronic pain disorder     lower back, hands , neck and knees    Coronary artery disease     Depression     Diabetes mellitus (HCC)     Elevated troponin 02/11/2022    GERD (gastroesophageal reflux disease)     no meds at present    Heart murmur     murmur    Hyperlipidemia     Hypertension     Neurogenic  bladder     Open toe wound 12/2020    right big toe open calus but is dry at present    PONV (postoperative nausea and vomiting)     Renal disorder     Shortness of breath     exertion    Skin ulcer of hand, limited to breakdown of skin (HCC) 02/25/2021    Sleep apnea     doesn't use cpap    Suprapubic catheter ([facility])     Urinary retention        Past Surgical History:   Procedure Laterality Date    AMPUTATION Left     2nd toe    ANGIOPLASTY  2017    5    BREAST EXCISIONAL BIOPSY Left     BREAST SURGERY      CARDIAC CATHETERIZATION      CARDIAC CATHETERIZATION N/A 07/01/2022    Procedure: Cardiac catheterization;  Surgeon: Minh Franz MD;  Location: [facility] CARDIAC C[facility];  Service: Cardiology    CARDIAC CATHETERIZATION N/A 07/01/2022    Procedure: Cardiac pci;  Surgeon: Minh Franz MD;  Location: [facility] CARD[facility]AC [facility];  Service: Cardiology    CARPAL TUNNEL RELEASE Bilateral     CERVICAL FUSION      HYSTERECTOMY  2008    IR AV FISTULAGRAM/GRAFTOGRAM  04/26/2023    IR AV FISTULAGRAM/GRAFTOGRAM  2/28/2024    IR SUPRAPUBIC CATHETER PLACEMENT  06/15/2021    IR SUPRAPUBIC CATHETER PLACEMENT  02/14/2023    IR TUNNELED CENTRAL LINE PLACEMENT  04/04/2023    IR TUNNELED DIALYSIS CATHETER PLACEMENT  07/15/2022    IR TUNNELED DIALYSIS CATHETER PLACEMENT  12/12/2022    IR TUNNELED DIALYSIS CATHETER REMOVAL  8/29/2023    KNEE SURGERY      OOPHORECTOMY  2008    TN ARTERIOVENOUS ANASTOMOSIS OPEN DIRECT Left 02/06/2023    Procedure: CREATION FISTULA  ARTERIOVENOUS (AV);  Surgeon: Minna Herbert DO;  Location: [facility] OR;  Service: Vascular    TN EXC B9 LESION MRGN XCP SK TG S/N/H/F/G 3.1-4.0CM N/A 12/21/2020    Procedure: EXCISION SEBACEOUS CYST X 5 SCALP;  Surgeon: Minh Benton MD;  Location:  MAIN OR;  Service: General    TN REVJ OPN ARVEN FSTL W/O THRMBC DIAL GRF Left 7/3/2023    Procedure: REVISION AV FISTULA;  Surgeon: Minna Herbert DO;  Location: [facility];  Service: Vascular    TOE AMPUTATION Left     TRIGGER FINGER  RELEASE Right     4th Finger       Social History     Socioeconomic History    Marital status:      Spouse name: None    Number of children: None    Years of education: None    Highest education level: None   Occupational History    None   Tobacco Use    Smoking status: Former     Current packs/day: 0.00     Average packs/day: 1 pack/day for 35.0 years (35.0 ttl pk-yrs)     Types: Cigarettes     Start date:      Quit date:      Years since quittin.6     Passive exposure: Past    Smokeless tobacco: Never   Vaping Use    Vaping status: Never Used   Substance and Sexual Activity    Alcohol use: Not Currently    Drug use: Never    Sexual activity: Not Currently   Other Topics Concern    None   Social History Narrative    None     Social Determinants of Health     Financial Resource Strain: Low Risk  (1/3/2024)    Overall Financial Resource Strain (CARDIA)     Difficulty of Paying Living Expenses: Not hard at all   Food Insecurity: No Food Insecurity (1/3/2024)    Hunger Vital Sign     Worried About Running Out of Food in the Last Year: Never true     Ran Out of Food in the Last Year: Never true   Transportation Needs: No Transportation Needs (1/3/2024)    PRAPARE - Transportation     Lack of Transportation (Medical): No     Lack of Transportation (Non-Medical): No   Physical Activity: Not on file   Stress: Not on file   Social Connections: Not on file   Intimate Partner Violence: Not on file   Housing Stability: Unknown (4/3/2024)    Housing Stability Vital Sign     Unable to Pay for Housing in the Last Year: No     Number of Times Moved in the Last Year: Not on file     Homeless in the Last Year: No       Family History   Problem Relation Age of Onset    Stroke Father     Heart disease Father     No Known Problems Mother     No Known Problems Sister     No Known Problems Daughter     No Known Problems Maternal Grandmother     No Known Problems Maternal Grandfather     No Known Problems Paternal  Grandmother     No Known Problems Paternal Grandfather     No Known Problems Maternal Aunt     No Known Problems Maternal Aunt     No Known Problems Maternal Aunt     No Known Problems Maternal Aunt     No Known Problems Maternal Aunt     No Known Problems Maternal Aunt     No Known Problems Paternal Aunt        Allergies   Allergen Reactions    Latex Itching    Codeine GI Intolerance       No current facility-administered medications on file prior to encounter.     Current Outpatient Medications on File Prior to Encounter   Medication Sig    allopurinol (ZYLOPRIM) 100 mg tablet TAKE 2 TABLETS (200 MG) BY MOUTH DAILY    aspirin (Aspirin Low Dose) 81 mg EC tablet TAKE 1 TABLET (81 MG TOTAL) BY MOUTH DAILY    atorvastatin (LIPITOR) 40 mg tablet TAKE 1 TABLET (40 MG TOTAL) BY MOUTH DAILY    Blood Glucose Monitoring Suppl (OneTouch Verio Reflect) w/Device KIT Check blood sugars once daily. Please substitute with appropriate alternative as covered by patient's insurance. Dx: E11.65    calcitriol (ROCALTROL) 0.5 MCG capsule Take 1 capsule (0.5 mcg total) by mouth 3 (three) times a week    citalopram (CeleXA) 40 mg tablet TAKE 1 TABLET BY MOUTH DAILY    clopidogrel (PLAVIX) 75 mg tablet TAKE 1 TABLET (75 MG TOTAL) BY MOUTH DAILY    Continuous Blood Gluc Sensor (Dexcom G7 Sensor) Use 1 Device every 10 days    docusate sodium (COLACE) 100 mg capsule Take 1 capsule (100 mg total) by mouth 2 (two) times a day    ergocalciferol (VITAMIN D2) 50,000 units TAKE 1 CAPSULE (50,000 UNITS TOTAL) BY MOUTH 3 (THREE) TIMES A WEEK    glucose blood (OneTouch Verio) test strip Check blood sugars once daily. Please substitute with appropriate alternative as covered by patient's insurance. Dx: E11.65    insulin degludec (Tresiba FlexTouch) 200 units/mL CONCENTRATED U-200 injection pen Inject 35 units once daily    insulin lispro (HumaLOG) 100 units/mL injection pen INJECT 20 UNITS UNDER SKIN THREE TIMES A DAY BEFORE MEALS    Insulin Pen  Needle (BD Pen Needle Micro U/F) 32G X 6 MM MISC Inject under the skin 3 (three) times a day    Insulin Pen Needle (BD Pen Needle Joanne 2nd Gen) 32G X 4 MM MISC INJECT UNDER THE SKIN 4 (FOUR) TIMES A DAY USE AS DIRECTED    isosorbide mononitrate (IMDUR) 30 mg 24 hr tablet TAKE 1 TABLET (30 MG TOTAL) BY MOUTH EVERY EVENING    lactulose (CHRONULAC) 10 g/15 mL solution TAKE 30 ML (20 G TOTAL) BY MOUTH DAILY AS NEEDED (FOR CONSTIPATION)    levothyroxine 50 mcg tablet TAKE 1 TABLET (50 MCG TOTAL) BY MOUTH DAILY    losartan (COZAAR) 25 mg tablet TAKE 1 TABLET (25 MG TOTAL) BY MOUTH DAILY    midodrine (PROAMATINE) 5 mg tablet TAKE 1 TABLET (5 MG TOTAL) BY MOUTH AS NEEDED (IF SBP<100 WITH DIALYSIS)    nystatin (MYCOSTATIN) powder Apply topically 3 (three) times a day    OLANZapine (ZyPREXA) 5 mg tablet TAKE 1 TABLET (5 MG TOTAL) BY MOUTH DAILY AT BEDTIME    ondansetron (ZOFRAN) 4 mg tablet Take 1 tablet (4 mg total) by mouth every 8 (eight) hours as needed for nausea or vomiting    OneTouch Delica Lancets 33G MISC Check blood sugars once daily. Please substitute with appropriate alternative as covered by patient's insurance. Dx: E11.65    oxyCODONE (ROXICODONE) 10 MG TABS Take 1 tablet (10 mg total) by mouth every 8 (eight) hours as needed for severe pain 30 days supply Max Daily Amount: 30 mg    Ozempic, 2 MG/DOSE, 8 MG/3ML injection pen INJECT 0.75 ML (2 MG TOTAL) UNDER THE SKIN EVERY 7 DAYS    senna-docusate sodium (Senexon-S) 8.6-50 mg per tablet TAKE 1 TABLET BY MOUTH DAILY    sevelamer carbonate (RENVELA) 800 mg tablet TAKE 2 TABLETS (1,600 MG TOTAL) BY MOUTH 3 (THREE) TIMES A DAY WITH MEALS    silver sulfadiazine (SILVADENE,SSD) 1 % cream Apply topically daily    furosemide (LASIX) 80 mg tablet TAKE 2 TABLETS (160 MG TOTAL) BY MOUTH DAILY    hydrALAZINE (APRESOLINE) 25 mg tablet TAKE 1 TABLET (25 MG TOTAL) BY MOUTH 3 (THREE) TIMES A DAY HOLD SBP <100    hydrALAZINE (APRESOLINE) 25 mg tablet TAKE 1 TABLET (25 MG TOTAL)  BY MOUTH 3 (THREE) TIMES A DAY HOLD SBP <100    ketoconazole (NIZORAL) 2 % cream     naloxone (NARCAN) 4 mg/0.1 mL nasal spray Administer 1 spray into a nostril. If breathing does not return to normal or if breathing difficulty resumes after 2-3 minutes, give another dose in the other nostril using a new spray. (Patient not taking: Reported on 8/30/2024)    nitroglycerin (NITROSTAT) 0.4 mg SL tablet Place 1 tablet (0.4 mg total) under the tongue every 5 (five) minutes as needed for chest pain (Patient not taking: Reported on 8/30/2024)    pantoprazole (PROTONIX) 40 mg tablet TAKE 1 TABLET (40 MG TOTAL) BY MOUTH DAILY    [DISCONTINUED] oxygen gas Inhale 2 L/min continuous. Indications: Respiratory Failure    [DISCONTINUED] Torsemide 40 MG TABS Take 40 mg by mouth daily        Current Facility-Administered Medications   Medication Dose Route Frequency Provider Last Rate    acetaminophen  650 mg Oral Q4H PRN Ismael Rivas MD      allopurinol  200 mg Oral Daily Ismael Rivas MD      aspirin  81 mg Oral Daily Ismael Rivas MD      atorvastatin  40 mg Oral Daily Ismael Rivas MD      citalopram  40 mg Oral Daily Ismael Rivas MD      clopidogrel  75 mg Oral Daily Ismael Rivas MD      docusate sodium  100 mg Oral BID Ismael Rivas MD      ergocalciferol  50,000 Units Oral Once per day on Monday Wednesday Friday Ismael Rivas MD      furosemide  120 mg Intravenous BID Ismael Rivas MD      heparin (porcine)  3-20 Units/kg/hr (Order-Specific) Intravenous Titrated Ismael Rivas MD 13.1 Units/kg/hr (08/31/24 0718)    heparin (porcine)  2,000 Units Intravenous Q6H PRN Ismael Rivas MD      heparin (porcine)  4,000 Units Intravenous Q6H PRN Ismael Rivas MD      insulin glargine  20 Units Subcutaneous QAM Ismael Rivas MD      insulin lispro  1-6 Units Subcutaneous 4x Daily (AC & HS) Garcia Lopez DO      isosorbide mononitrate  30 mg Oral QPM Ismael Rivas MD      lactulose  20 g Oral Daily PRN Ismael Rivas MD       levothyroxine  50 mcg Oral Daily Before Breakfast Ismael Rivas MD      losartan  25 mg Oral Daily Ismael Rivas MD      midodrine  5 mg Oral Before Dialysis Ismael Rivas MD      OLANZapine  5 mg Oral HS Ismael Rivas MD      ondansetron  4 mg Intravenous Q6H PRN Ismael Rivas MD      oxyCODONE  10 mg Oral Q4H PRN Ismael Rivas MD      oxyCODONE  5 mg Oral Q4H PRN Ismael Rivas MD      pantoprazole  40 mg Oral Early Morning Ismael Rivas MD      senna-docusate sodium  1 tablet Oral Daily Ismael Rivas MD      sevelamer  1,600 mg Oral TID With Meals Birgit Olguin PA-C         heparin (porcine), 3-20 Units/kg/hr (Order-Specific), Last Rate: 13.1 Units/kg/hr (08/31/24 0718)        Vitals:    08/31/24 0300 08/31/24 0555 08/31/24 0736 08/31/24 1108   BP: 108/64  133/63 118/60   BP Location: Right arm  Right arm Right arm   Pulse: 68  71 69   Resp: 16  18 18   Temp: 97.7 °F (36.5 °C)  (!) 97 °F (36.1 °C) (!) 97.1 °F (36.2 °C)   TempSrc: Temporal  Temporal Temporal   SpO2:   95% 97%   Weight:  116 kg (256 lb 13.4 oz)     Height:           No intake/output data recorded.      Intake/Output Summary (Last 24 hours) at 8/31/2024 1209  Last data filed at 8/31/2024 0801  Gross per 24 hour   Intake 120 ml   Output --   Net 120 ml       Weight change:     PHYSICAL EXAMINATION:  Gen: Awake, Alert, NAD  Head/eyes: AT/NC, pupils equal and round, Anicteric  ENT: mmm  Neck: Supple, No elevated JVP, trachea midline  Resp: CTA bilaterally no w/r/r  CV: RRR +S1, S2, No m/r/g  Abd: Soft, obese, NT/ND + BS  Ext: no LE edema bilaterally  Neuro: Follows commands, moves all extermities  Psych: Appropriate affect, normal mood, pleasant attitude, non-combative  Skin: warm; no rash, erythema or venous stasis changes on exposed skin    Lab Results:  Results from last 7 days   Lab Units 08/31/24  0545   WBC Thousand/uL 7.31   HEMOGLOBIN g/dL 8.3*   HEMATOCRIT % 24.8*   PLATELETS Thousands/uL 206     Results from last 7 days   Lab Units  "24  0545   POTASSIUM mmol/L 3.7   CHLORIDE mmol/L 92*   CO2 mmol/L 25   BUN mg/dL 55*   CREATININE mg/dL 5.19*   CALCIUM mg/dL 8.0*   ALK PHOS U/L 85   ALT U/L 140*   AST U/L 223*     No results found for: \"TROPONINT\"      Results from last 7 days   Lab Units 24  1932 24  1733 24  1528   HS TNI 0HR ng/L  --   --  18,297*   HS TNI 2HR ng/L  --  16,953*  --    HS TNI 4HR ng/L 17,101*  --   --          Results from last 7 days   Lab Units 24  2236   INR  1.37*           Results for orders placed during the hospital encounter of 21    Echo complete with contrast if indicated    [place] [id number]    Transthoracic Echocardiogram  2D, M-mode, Doppler, and Color Doppler    Study date:  24-Aug-2021    Patient: CARLY GOLDBERG  MR number: [id number]  Account number: [id number]  : 1960  Age: 60 years  Gender: Female  Status: Inpatient  Location: [facility]  Height: 68 in  Weight: 312.4 lb  BP: 149/ 79 mmHg    Indications: Heart failure    Diagnoses: I50.9 - Heart failure, unspecified    Sonographer:  CASANDRA Sotomayor  Interpreting Physician:  Irwin England D.O.  Primary Physician:  Catarino Phillip MD  Referring Physician:  CHERELLE Nichole  Group:  [facility]    SUMMARY    LEFT VENTRICLE:  Systolic function was mildly reduced by visual assessment. Ejection fraction was estimated to be 45 %.  There were regional wall motion abnormalities that suggest coronary artery disease.  There was severe hypokinesis of the basal-mid anteroseptal and basal-mid inferoseptal wall(s).  There was moderate hypokinesis of the basal-mid inferior wall(s).  Wall thickness was mildly increased.  There was mild concentric hypertrophy.  Features were consistent with grade 2 diastolic dysfunction.  Doppler parameters were consistent with high ventricular filling pressure. E/E' was > " "19    VENTRICULAR SEPTUM:  There was dyssynergic motion. These changes are consistent with LBBB.    MITRAL VALVE:  There was mild \"progressive\" mitral stenosis. Mean gradient of 5 mmHg at 73 bpm. MVA by Doppler continuity equation is 2.15 cm2.    TRICUSPID VALVE:  There was mild regurgitation.    PULMONIC VALVE:  There was trace regurgitation.    PERICARDIUM:  A trace pericardial effusion was identified.    HISTORY: PRIOR HISTORY: CAD, CKD Risk factors: hypertension and diabetes.    PROCEDURE: The study was performed in the [facility]. This was a routine study. The transthoracic approach was used. The study included complete 2D imaging, M-mode, complete spectral Doppler, and color Doppler. The heart rate was 76  bpm, at the start of the study. Images were obtained from the parasternal, apical, subcostal, and suprasternal notch acoustic windows. Image quality was adequate.    LEFT VENTRICLE: Size was normal. Systolic function was mildly reduced by visual assessment. Ejection fraction was estimated to be 45 %. There were regional wall motion abnormalities that suggest coronary artery disease.  There was severe hypokinesis of the basal-mid anteroseptal and basal-mid inferoseptal wall(s). There was moderate hypokinesis of the basal-mid inferior wall(s). Wall thickness was mildly increased. There was mild concentric hypertrophy.  DOPPLER: Features were consistent with grade 2 diastolic dysfunction. Doppler parameters were consistent with high ventricular filling pressure. E/E' was > 19    VENTRICULAR SEPTUM: There was dyssynergic motion. These changes are consistent with LBBB.    RIGHT VENTRICLE: The size was normal. Systolic function was normal. Wall thickness was normal.    LEFT ATRIUM: Size was within the limits of normal when indexed for body surface area. LA volume index 31 ml/m2.    RIGHT ATRIUM: Size was normal.    MITRAL VALVE: Valve structure was normal. There was mild thickening. There was normal leaflet " "separation. There was mildly restricted mobility of the anterior leaflet. DOPPLER: The transmitral velocity was within the normal range. There  was mild \"progressive\" mitral stenosis. Mean gradient of 5 mmHg at 73 bpm. MVA by Doppler continuity equation is 2.15 cm2. There was no regurgitation.    AORTIC VALVE: The valve was trileaflet. Leaflets exhibited normal thickness and normal cuspal separation. DOPPLER: Transaortic velocity was within the normal range. There was no evidence for stenosis. There was no regurgitation.    TRICUSPID VALVE: The valve structure was normal. There was normal leaflet separation. DOPPLER: The transtricuspid velocity was within the normal range. There was no evidence for stenosis. There was mild regurgitation. Estimated peak PA  pressure was 34 mmHg.    PULMONIC VALVE: Not well visualized. DOPPLER: There was no evidence for stenosis. There was trace regurgitation.    PERICARDIUM: A trace pericardial effusion was identified. The pericardium was normal in appearance.    AORTA: The root exhibited normal size.    SYSTEMIC VEINS: IVC: The inferior vena cava was normal in size and course. Respirophasic changes were normal.    SYSTEM MEASUREMENT TABLES    2D  %FS: 25.15 %  Ao Diam: 2.77 cm  EDV(Teich): 136.76 ml  EF(Teich): 49.26 %  ESV(Teich): 69.39 ml  IVSd: 1.07 cm  LA Area: 30.26 cm2  LA Diam: 4.91 cm  LVEDV MOD A4C: 210.21 ml  LVEF MOD A4C: 46.53 %  LVESV MOD A4C: 112.41 ml  LVIDd: 5.32 cm  LVIDs: 3.99 cm  LVLd A4C: 9.67 cm  LVLs A4C: 8.21 cm  LVPWd: 1.1 cm  RA Area: 18.38 cm2  RVIDd: 3.28 cm  SV MOD A4C: 97.8 ml  SV(Teich): 67.38 ml    CW  TR Vmax: 2.78 m/s  TR maxP.99 mmHg    MM  TAPSE: 2.42 cm    PW  E' Sept: 0.06 m/s  E/E' Sept: 19.84  MV A Santosh: 1.49 m/s  MV Dec [place]: 9.08 m/s2  MV DecT: 137.82 ms  MV E Santosh: 1.25 m/s  MV E/A Ratio: 0.84  MV PHT: 39.97 ms  MVA By PHT: 5.5 cm2    [facility] Accredited Echocardiography Laboratory    Prepared and electronically signed " by    Irwin England D.O.  Signed 24-Aug-2021 14:54:43    Results for orders placed during the hospital encounter of 21    Cardiac catheterization    Narrative  [address] [address] [phone]    ([place])    Invasive Cardiovascular Lab Complete Report    Patient: CARLY GOLDBERG  MR number: [id number]  Account number: [id number]  Study date: 2021  Gender: Female  : 1960  Height: 68.1 in  Weight: 279.4 lb  BSA: 2.36 mï¾²    Allergies: CODEINE, LATEX    Diagnostic Cardiologist:  Dajuan Mac MD  Primary Physician:  CARRIE QUINTANILLA    SUMMARY    CORONARY CIRCULATION:  Proximal RCA: There was a 100 % stenosis. This lesion is a chronic total occlusion.    INDICATIONS:  --  Possible CAD: myocardial infarction without ST elevation (NSTEMI).  --  Congestive heart failure with ischemic cardiomyopathy.    PROCEDURES PERFORMED    --  Left heart catheterization without ventriculogram.  --  Left coronary angiography.  --  Right coronary angiography.  --  Inpatient.  --  Mod Sedation Same Physician Initial 15min.  --  Coronary Catheterization (w/ [facility]).    PROCEDURE: The risks and alternatives of the procedures and conscious sedation were explained to the patient and informed consent was obtained. The patient was brought to the cath lab and placed on the table. The planned puncture sites  were prepped and draped in the usual sterile fashion.    --  Right radial artery access. After performing an Erasto's test to verify adequate ulnar artery supply to the hand, the radial site was prepped. The puncture site was infiltrated with local anesthetic. The vessel was accessed using the  modified Seldinger technique, a wire was advanced into the vessel, and a sheath was advanced over the wire into the vessel.    --  Left heart catheterization without ventriculogram. A catheter was advanced over a guidewire into the ascending aorta. After recording  ascending aortic pressure, the catheter was advanced across the aortic valve and left ventricular  pressure was recorded. The catheter was pulled back across the aortic valve and into the ascending aorta and pullback pressures were obtained.    --  Left coronary artery angiography. A catheter was advanced over a guidewire into the aorta and positioned in the left coronary artery ostium under fluoroscopic guidance. Angiography was performed.    --  Right coronary artery angiography. A catheter was advanced over a guidewire into the aorta and positioned in the right coronary artery ostium under fluoroscopic guidance. Angiography was performed.    --  Inpatient.    --  Mod Sedation Same Physician Initial 15min.    --  Coronary Catheterization (/ [facility] [facility]).    PROCEDURE COMPLETION: The patient tolerated the procedure well and was discharged from the cath lab. TIMING: Test started at 14:33. Test concluded at 14:45. HEMOSTASIS: The sheath was removed. The site was compressed with a Hemoband  device. Hemostasis was obtained. MEDICATIONS GIVEN: Fentanyl (1OOmcg/2 ml), 50 mcg, IV, at 14:30. Versed (2mg/2ml), 2 mg, IV, at 14:30. Zofran (Ondansetron), 4 mg, IV, at 14:34. 1% Lidocaine, 2 ml, subcutaneously, at 14:35. Nitroglycerin  (200mcg/ml), 200 mcg, at 14:37. Verapamil (5mg/2ml), 5 mg, IV, at 14:37. Heparin 1000 units/ml, 4,000 units, IV, at 14:37. CONTRAST GIVEN: 30 ml Visipaque (320mg I /ml). RADIATION EXPOSURE: Fluoroscopy time: 1.32 min.    HEMODYNAMICS: Hemodynamic assessment demonstrated normal LVEDP. 12-14    CORONARY VESSELS:   --  The coronary circulation is left dominant.  --  Left main: The vessel was medium to large sized. Angiography showed mild atherosclerosis.  --  LAD: The vessel was large sized. Angiography showed no evidence of disease.  --  Proximal LAD: There was a discrete 30 % stenosis at the site of a prior stent.  --  Mid LAD: There was a 0 % stenosis at the site of a prior stent.  --  Distal LAD: There  was a 0 % stenosis at the site of a prior stent.  --  Proximal circumflex: There was a 0 % stenosis at the site of a prior stent.  --  RCA: The vessel was small (non-dominant).  --  Proximal RCA: There was a 100 % stenosis. This lesion is a chronic total occlusion.    IMPRESSIONS:  Type 2 MI  There is significant single vessel coronary artery disease.    RECOMMENDATIONS  The patient should continue with the present medications.    DISPOSITION:  The patient left the catheterization laboratory in stable condition.    Prepared and signed by    Dajuan Mac MD  Signed 2021 14:55:54    Study diagram    Angiographic findings  Native coronary lesions:  ï¾·Proximal LAD: Lesion 1: discrete, 30 % stenosis, site of prior stent.  ï¾·Mid LAD: Lesion 1: 0 % stenosis, site of prior stent.  ï¾·Distal LAD: Lesion 1: 0 % stenosis, site of prior stent.  ï¾·Proximal circumflex: Lesion 1: 0 % stenosis, site of prior stent.  ï¾·Proximal RCA: Lesion 1: 100 % stenosis.    Hemodynamic tables    Pressures:  Baseline  Pressures:  - HR: 66  Pressures:  - Rhythm:  Pressures:  -- Aortic Pressure (S/D/M): 153/61/76  Pressures:  -- Left Ventricle (s/edp): 139/12/--  Pressures:  -- Left Ventricle (s/edp): 139/19/--    Outputs:  Baseline  Outputs:  -- CALCULATIONS[age]  Outputs:  -- CALCULATIONS: Body Surface Area: 2.36  Outputs:  -- CALCULATIONS: Height in cm: 173.00  Outputs:  -- CALCULATIONS: Sex: Female  Outputs:  -- CALCULATIONS: Weight in k.00    No results found for this or any previous visit.    Results for orders placed during the hospital encounter of 22    NM myocardial perfusion spect (rx stress and/or rest)    Interpretation Summary  1. Nondiagnostic ECG portion of stress test due to presence of left bundle-branch block abnormality.  No angina and no new ST T-wave abnormalities were noted during the stress test.  2. Abnormal myocardial perfusion scan.  There were multiple perfusion abnormalities  involving the inferolateral and lateral wall and anterior and anteroseptal walls.  Cannot exclude ischemia involving multiple vessels (triple-vessel and left main disease).  Imaging portion of the stress test is affected by significant soft tissue and diaphragmatic attenuation artifact.  3. Dilated left ventricular cavity with moderate to markedly reduced left ventricular systolic function and regional wall motion abnormalities.  Post-stress ejection fraction was calculated as 32% although visually it was around 40%.  4. Elevated resting blood pressure with appropriate blood pressure response noted during stress test.  5. Abnormal baseline ECG with left bundle-branch block.  No significant changes and no significant arrhythmia noted during stress test.    There is no prior study for comparison.  Clinical correlation is recommended.      CXR: Results for orders placed during the hospital encounter of 08/30/24    XR chest 2 views    Narrative  XR CHEST PA AND LATERAL    INDICATION: weakness.    COMPARISON: Radiograph 12/11/2022    FINDINGS:    Small to moderate bilateral pleural effusions and diffuse interstitial opacities. No pneumothorax.    Normal cardiomediastinal silhouette.    Cervical spine ACDF.    Normal upper abdomen.    Impression  Small bilateral pleural effusions and mild to moderate pulmonary interstitial edema.        Workstation performed: [id number]    Results for orders placed during the hospital encounter of 08/25/22    XR chest 1 view portable    Narrative  CHEST    INDICATION:   sob.    COMPARISON:  Chest radiograph from 7/6/2022, abdomen CT from 8/25/2022.    EXAM PERFORMED/VIEWS:  XR CHEST PORTABLE      FINDINGS:  Right jugular catheter in lower SVC.    Heart size exaggerated by the portable projection.    The lungs are clear.  No pneumothorax or pleural effusion.    Cervicothoracic fusion.    Impression  No acute cardiopulmonary disease.            Workstation performed: [id number]      ECG:  Sinus rhythm left bundle branch block    This note was completed in part utilizing M-Modal Fluency Direct Software.  Grammatical errors, random word insertions, spelling mistakes, and incomplete sentences may be an occasional consequence of this system secondary to software limitations, ambient noise, and hardware issues.  If you have any questions or concerns about the content, text, or information contained within the body of this dictation, please contact the provider for clarification.

## 2024-08-31 NOTE — HEMODIALYSIS
Post-Dialysis RN Treatment Note    Blood Pressure:  Pre 116/59 mm/Hg  Post 126/63 mmHg   EDW  108.5 kg    Weight:  Pre 110.9 kg   Post 107.9 kg   Mode of weight measurement: Standing Scale   Volume Removed  2400 ml    Treatment duration 183 minutes    NS given  No    Treatment shortened? Yes, describe: pt c/o severe urge to pass bm and wanted to end tx after 3 hrs   Medications given during Rx Midodrine 5mg   Estimated Kt/V  Not Applicable   Access type: AV fistula   Access Issues: No    Report called to primary nurse   Yes Lauren Delcid RN    Overall stable tx, pt unable to complete 3.5 hrs due to constipation, wanted to use toilet.

## 2024-08-31 NOTE — PHYSICAL THERAPY NOTE
Physical Therapy Evaluation:    2 forms of pt ID verified:name,birthdate and pt ID [email or web address]    Patient's Name: Dee Dee Shaffer    Admitting Diagnosis  Acute congestive heart failure (HCC) [I50.9]  Weakness [R53.1]  Anemia [D64.9]  Hypoxia [R09.02]  ESRD (end stage renal disease) ([facility]) [N18.6]  CKD (chronic kidney disease) [N18.9]  Elevated troponin [R79.89]  ALFRED (acute kidney injury) ([facility]) [N17.9]    Problem List  Patient Active Problem List   Diagnosis    Type 2 diabetes mellitus with chronic kidney disease on chronic dialysis, with long-term current use of insulin (HCC)    Anemia    S/P cervical spinal fusion    Problem with vascular access    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 ([facility])    Severe episode of recurrent major depressive disorder, without psychotic features ([facility])    Memory problem    CKD (chronic kidney disease) stage 4, GFR 15-29 ml/min (HCC)    ESRD (end stage renal disease) ([facility])    History of heart artery stent    Neurogenic bladder    Anemia due to chronic kidney disease, on chronic dialysis (HCC)    Chronic combined systolic and diastolic congestive heart failure (HCC)    Prolonged QT interval    Gastroesophageal reflux disease without esophagitis    Acquired hypothyroidism    Mixed hyperlipidemia    Continuous opioid dependence (HCC)    Adrenal nodule (HCC)    Coronary artery disease with stable angina pectoris ([facility])    History of anemia due to chronic kidney disease    NSVT (nonsustained ventricular tachycardia) ([facility])    Suprapubic catheter ([facility])    Polyarthralgia    Chronic left shoulder pain    Acquired absence of other left toe(s) ([facility])    Dependence on renal dialysis ([facility])    Physical deconditioning    Acute cystitis without hematuria    Chronic pain disorder    Chronic obstructive pulmonary disease, unspecified COPD type (HCC)    Secondary hyperparathyroidism of renal origin (HCC)    Other specified peripheral vascular diseases ([facility])     Obesity, morbid (HCC)    Constipation    Finger wound, simple, open, subsequent encounter    Hypertension    Acute decompensated heart failure (HCC)       Past Medical History  Past Medical History:   Diagnosis Date    Abnormal liver function     Anemia     Anxiety     Arthritis     CHF (congestive heart failure) (HCC)     Chronic kidney disease     stage 3    Chronic narcotic dependence (HCC)     Chronic pain disorder     lower back, hands , neck and knees    Coronary artery disease     Depression     Diabetes mellitus (HCC)     Elevated troponin 02/11/2022    GERD (gastroesophageal reflux disease)     no meds at present    Heart murmur     murmur    Hyperlipidemia     Hypertension     Neurogenic bladder     Open toe wound 12/2020    right big toe open calus but is dry at present    PONV (postoperative nausea and vomiting)     Renal disorder     Shortness of breath     exertion    Skin ulcer of hand, limited to breakdown of skin (HCC) 02/25/2021    Sleep apnea     doesn't use cpap    Suprapubic catheter (HCC)     Urinary retention        Past Surgical History  Past Surgical History:   Procedure Laterality Date    AMPUTATION Left     2nd toe    ANGIOPLASTY  2017    5    BREAST EXCISIONAL BIOPSY Left     BREAST SURGERY      CARDIAC CATHETERIZATION      CARDIAC CATHETERIZATION N/A 07/01/2022    Procedure: Cardiac catheterization;  Surgeon: Minh Franz MD;  Location: [facility];  Service: Cardiology    CARDIAC CATHETERIZATION N/A 07/01/2022    Procedure: Cardiac pci;  Surgeon: Minh Franz MD;  Location: [facility];  Service: Cardiology    CARPAL TUNNEL RELEASE Bilateral     CERVICAL FUSION      HYSTERECTOMY  2008    IR AV FISTULAGRAM/GRAFTOGRAM  04/26/2023    IR AV FISTULAGRAM/GRAFTOGRAM  2/28/2024    IR SUPRAPUBIC CATHETER PLACEMENT  06/15/2021    IR SUPRAPUBIC CATHETER PLACEMENT  02/14/2023    IR TUNNELED CENTRAL LINE PLACEMENT  04/04/2023    IR TUNNELED DIALYSIS CATHETER PLACEMENT  07/15/2022     IR TUNNELED DIALYSIS CATHETER PLACEMENT  12/12/2022    IR TUNNELED DIALYSIS CATHETER REMOVAL  8/29/2023    KNEE SURGERY      OOPHORECTOMY  2008    ME ARTERIOVENOUS ANASTOMOSIS OPEN DIRECT Left 02/06/2023    Procedure: CREATION FISTULA  ARTERIOVENOUS (AV);  Surgeon: Minna Herbert DO;  Location: [facility];  Service: Vascular    ME EXC B9 LESION MRGN XCP SK TG S/N/H/F/G 3.1-4.0CM N/A 12/21/2020    Procedure: EXCISION SEBACEOUS CYST X 5 SCALP;  Surgeon: Minh Benton MD;  Location:  MAIN OR;  Service: General    ME REVJ OPN ARVEN FSTL W/O THRMBC DIAL GRF Left 7/3/2023    Procedure: REVISION AV FISTULA;  Surgeon: Minna Herbert DO;  Location: [facility];  Service: Vascular    TOE AMPUTATION Left     TRIGGER FINGER RELEASE Right     4th Finger        08/31/24 0952   PT Last Visit   PT Visit Date 08/31/24   Note Type   Note type Evaluation   Pain Assessment   Pain Assessment Tool 0-10   Pain Score 6   Pain Location/Orientation Location: Back;Orientation: Bilateral;Location: Knee  (pt reports chronic back and B knee pain)   Hospital Pain Intervention(s) Repositioned;Ambulation/increased activity;Elevated;Emotional support;Rest   Restrictions/Precautions   Other Precautions Chair Alarm;Bed Alarm;Multiple lines;Telemetry;O2;Fall Risk;Contact/isolation  (r/o COVID)   Home Living   Type of Home House   Home Layout One level;Stairs to enter with rails;Performs ADLs on one level;Able to live on main level with bedroom/bathroom  (4-6 DOROTHEA)   Home Equipment Walker;Cane  (rollator)   Additional Comments pt lives with daughter and daughter's S.O. in [place], (+)[place], reports daughter is primary and paid caregiver, pt reports never being alone at home,(-)drive, disability, reports no recent falls, needs A for IADLs and ADLs   Prior Function   Level of [place] Independent with ADLs;Needs assistance with ADLs;Needs assistance with IADLS   Lives With Daughter;Other (Comment)  (daughter's S.O.)   Receives Help From Family  "  IADLs Family/Friend/Other provides transportation;Family/Friend/Other provides meals;Family/Friend/Other provides medication management   Falls in the last 6 months 0   Vocational On disability   General   Additional Pertinent History worsening SOB and weakness x1 week, 17 pound weight gain in the past month,acute CHF,anemia, hypoxia, ESRD, CKD   Family/Caregiver Present No   Cognition   Overall Cognitive Status WFL   Arousal/Participation Cooperative   Attention Within functional limits   Orientation Level Oriented X4   Following Commands Follows one step commands without difficulty   Subjective   Subjective Pt sitting at EOB upon arrival;pt willing and agreeable to work with PT and to participate in therapy intervention; \"I just still feel weak and I dont want to fall\".   RLE Assessment   RLE Assessment   (at least 4/5 grossly throughout)   LLE Assessment   LLE Assessment   (at least 4/5 grossly throughout)   Vision-Basic Assessment   Current Vision Wears glasses all the time   Coordination   Movements are Fluid and Coordinated 0   Coordination and Movement Description dec BLE step length, shuffling gait pattern at times,pain,forward flexed posture   Sensation WFL   Light Touch   RLE Light Touch Grossly intact   LLE Light Touch Grossly intact   Bed Mobility   Supine to Sit Unable to assess  (pt in static sit pre and post mobility)   Transfers   Sit to Stand 5  Supervision   Additional items Assist x 1;Bedrails;Increased time required;Verbal cues   Stand to Sit 5  Supervision   Additional items Assist x 1;Armrests;Increased time required;Verbal cues   Ambulation/Elevation   Gait pattern Poor UE support;Antalgic;Narrow SHELTON;Forward Flexion;Shuffling;Inconsistent marilyn;Foward flexed;Short stride;Ataxia;Excessively slow   Gait Assistance 5  Supervision   Additional items Assist x 1;Verbal cues   Assistive Device Rolling walker   Distance 22 feet with use of RW;limited ambulation distance 2* fatigue, fear of " "falling, weakness and SOB   Balance   Static Sitting Fair   Dynamic Sitting Fair   Static Standing Fair   Dynamic Standing Fair   Ambulatory Fair   Endurance Deficit   Endurance Deficit Yes   Endurance Deficit Description 2 L NC O2;dec activity tolerance, dec endurance, limited ambulation distance 2* pain, weakness and dec endurance->pt reports \"I dont want to fall\".   Activity Tolerance   Activity Tolerance Patient limited by pain;Patient limited by fatigue  (fair)   Medical Staff Made Aware OTR Mechelle   Nurse Made Aware yes   Assessment   Prognosis Fair   Problem List Decreased strength;Decreased endurance;Impaired balance;Decreased mobility;Obesity;Decreased skin integrity;Pain   Assessment Pt is a 62 yo female admitted to [facility]* acute CHF, weakness, anemia, hypoxia, ESRD, CKD, reports worsening SOB and weakness x1 week, 17 # weight gain in the past month. Pt lvies with primary and paid caregiver daughter in [place], (+)DOROTHEA, reports no recent falls, use of walker PTA and pt reports needing A for ADLs and IADLs. Pt currently is not at functional mobility baseline, needs A for mobility with use of RW, use of 2 L NC O2 currently,ataxic and unsteady gait and movement pattern,chronic pain in back and B knees,multiple lines,masimo monitoring. Pt demonstrates limited mobility and gait 2* dec endurance, dec balance, dec BLE strength, ataxic and unsteady gait and movement pattern,pain, slow marilyn and movement and needs S TT and GT with use of RW. Pt would cont to benefit from skilled inpt PT services to maximize functional independence and to dec caregiver burden upon being DC from the hospital.   Barriers to Discharge Inaccessible home environment  (DOROTHEA)   Goals   Patient Goals to not fall   STG Expiration Date 09/10/24   Short Term Goal #1 in 7-10 days: (1) Pt will be able to ambulate greater than 100 feet with use of RW on various surfaces needing S to mod (I) level of A in order to A pt to return to PLOF, (2) activity " tolerance:45 mins/45mins, (3) pt will be able to perform sit to stand transfers needing mod (I) level of A to and from various surfaces consistently in order to return to PLOF, (4) pt will be able to perform BM needing mod (I) level of A to A pt to return to PLOF, (5) (I) with BLE therapeutic ex HEP in various positions to A pt to inc balance,strength,mobility,endurance and to A to dec pain, (6) inc balance 1/2 grade in order to dec fall risk, (7) pt will be able to go up and down 6 steps needing S level of A in order to navigate DOROTHEA as able and as needed prior to D/C, (8) cont to provide pt and pt family education for safe D/C planning, (9) inc BLE strength 1/2 to 1 full grade in order to A pt to inc balance,strength,mobility,endurance and to A to dec pain   PT Treatment Day 0   Plan   Treatment/Interventions Functional transfer training;LE strengthening/ROM;Elevations;Therapeutic exercise;Endurance training;Patient/family training;Equipment eval/education;Bed mobility;Gait training;Spoke to nursing;OT   PT Frequency 3-5x/wk   Discharge Recommendation   Rehab Resource Intensity Level, PT III (Minimum Resource Intensity)   Equipment Recommended Walker  (pt owns rollator at home for use)   AM-PAC Basic Mobility Inpatient   Turning in Flat Bed Without Bedrails 3   Lying on Back to Sitting on Edge of Flat Bed Without Bedrails 3   Moving Bed to Chair 3   Standing Up From Chair Using Arms 3   Walk in Room 3   Climb 3-5 Stairs With Railing 2   Basic Mobility Inpatient Raw Score 17   Basic Mobility Standardized Score 39.67   [facility] Highest Level Of Mobility   -HLM Goal 5: Stand one or more mins   -HLM Achieved 6: Walk 10 steps or more           @Meenu Jaime, PT, DPT@

## 2024-08-31 NOTE — PLAN OF CARE
Target UF Goal 3 L as tolerated. Patient dialyzing for 4 hours on 4 K bath for serum K of  3.7  per protocol. Treatment plan reviewed with Nephrology.     Problem: METABOLIC, FLUID AND ELECTROLYTES - ADULT  Goal: Fluid balance maintained  Description: INTERVENTIONS:  - Monitor labs   - Monitor I/O and WT  - Instruct patient on fluid and nutrition as appropriate  - Assess for signs & symptoms of volume excess or deficit  Outcome: Progressing     Problem: METABOLIC, FLUID AND ELECTROLYTES - ADULT  Goal: Electrolytes maintained within normal limits  Description: INTERVENTIONS:  - Monitor labs and assess patient for signs and symptoms of electrolyte imbalances  - Administer electrolyte replacement as ordered  - Monitor response to electrolyte replacements, including repeat lab results as appropriate  - Instruct patient on fluid and nutrition as appropriate  Outcome: Progressing

## 2024-08-31 NOTE — OCCUPATIONAL THERAPY NOTE
Occupational Therapy Evaluation     Patient Name: Dee Dee Shfafer  Today's Date: 8/31/2024  Problem List  Principal Problem:    Acute decompensated heart failure (HCC)  Active Problems:    Type 2 diabetes mellitus with chronic kidney disease on chronic dialysis, with long-term current use of insulin (Formerly [facility])    ESRD (end stage renal disease) (Formerly [facility])    Neurogenic bladder    Anemia due to chronic kidney disease, on chronic dialysis (Formerly [facility])    Mixed hyperlipidemia    Coronary artery disease with stable angina pectoris (Formerly [facility])    Chronic obstructive pulmonary disease, unspecified COPD type (Formerly [facility])    Hypertension    Past Medical History  Past Medical History:   Diagnosis Date    Abnormal liver function     Anemia     Anxiety     Arthritis     CHF (congestive heart failure) (Formerly [facility])     Chronic kidney disease     stage 3    Chronic narcotic dependence (Formerly [facility])     Chronic pain disorder     lower back, hands , neck and knees    Coronary artery disease     Depression     Diabetes mellitus ([facility]ly [facility])     Elevated troponin 02/11/2022    GERD (gastroesophageal reflux disease)     no meds at present    Heart murmur     murmur    Hyperlipidemia     Hypertension     Neurogenic bladder     Open toe wound 12/2020    right big toe open calus but is dry at present    PONV (postoperative nausea and vomiting)     Renal disorder     Shortness of breath     exertion    Skin ulcer of hand, limited to breakdown of skin ([facility]) 02/25/2021    Sleep apnea     doesn't use cpap    Suprapubic catheter ([facility]ly [facility])     Urinary retention      Past Surgical History  Past Surgical History:   Procedure Laterality Date    AMPUTATION Left     2nd toe    ANGIOPLASTY  2017    5    BREAST EXCISIONAL BIOPSY Left     BREAST SURGERY      CARDIAC CATHETERIZATION      CARDIAC CATHETERIZATION N/A 07/01/2022    Procedure: Cardiac catheterization;  Surgeon: Minh Franz MD;  Location: [facility];  Service: Cardiology    CARDIAC CATHETERIZATION N/A 07/01/2022    Procedure:  Cardiac pci;  Surgeon: Minh Franz MD;  Location: [facility];  Service: Cardiology    CARPAL TUNNEL RELEASE Bilateral     CERVICAL FUSION      HYSTERECTOMY  2008    IR AV FISTULAGRAM/GRAFTOGRAM  04/26/2023    IR AV FISTULAGRAM/GRAFTOGRAM  2/28/2024    IR SUPRAPUBIC CATHETER PLACEMENT  06/15/2021    IR SUPRAPUBIC CATHETER PLACEMENT  02/14/2023    IR TUNNELED CENTRAL LINE PLACEMENT  04/04/2023    IR TUNNELED DIALYSIS CATHETER PLACEMENT  07/15/2022    IR TUNNELED DIALYSIS CATHETER PLACEMENT  12/12/2022    IR TUNNELED DIALYSIS CATHETER REMOVAL  8/29/2023    KNEE SURGERY      OOPHORECTOMY  2008    MS ARTERIOVENOUS ANASTOMOSIS OPEN DIRECT Left 02/06/2023    Procedure: CREATION FISTULA  ARTERIOVENOUS (AV);  Surgeon: Minna Herbert DO;  Location: [facility];  Service: Vascular    MS EXC B9 LESION MRGN XCP SK TG S/N/H/F/G 3.1-4.0CM N/A 12/21/2020    Procedure: EXCISION SEBACEOUS CYST X 5 SCALP;  Surgeon: Minh Benton MD;  Location:  MAIN OR;  Service: General    MS REVJ OPN ARVEN FSTL W/O THRMBC DIAL GRF Left 7/3/2023    Procedure: REVISION AV FISTULA;  Surgeon: Minna Herbert DO;  Location: [facility];  Service: Vascular    TOE AMPUTATION Left     TRIGGER FINGER RELEASE Right     4th Finger           08/31/24 0949   OT Last Visit   OT Visit Date 08/31/24   Note Type   Note type Evaluation   Pain Assessment   Pain Assessment Tool 0-10   Pain Score 6   Pain Location/Orientation Location: Back;Location: Knee;Orientation: Bilateral   Patient's Stated Pain Goal No pain   Hospital Pain Intervention(s) Repositioned;Ambulation/increased activity;Emotional support;Rest   Multiple Pain Sites No   Restrictions/Precautions   Weight Bearing Precautions Per Order No   Other Precautions Chair Alarm;Bed Alarm;Multiple lines;Telemetry;O2;Fall Risk;Pain;Limb alert  (2.5L O2 > RA)   Home Living   Type of Home House   Home Layout One level;Stairs to enter with rails  (4-6 DOROTHEA)   Bathroom Shower/Tub Tub/shower unit    Bathroom Toilet Standard   Bathroom Equipment   (Denies DME)   Bathroom Accessibility Accessible   Home Equipment Cane;Other (Comment)  (Rollator)   Additional Comments Pt lives with dtr and dtr's S.O. in a one level house with 4-6 DOROTHEA. Pt reports her dtr is her paid FT caregiver. (-) home alone   Prior Function   Level of [place] Independent with functional mobility;Needs assistance with ADLs;Needs assistance with IADLS  (I w/ toileting. Assist w/ dressing and bathing)   Lives With Daughter;Other (Comment)  (Dtr's S.O.)   Receives Help From Family   IADLs Family/Friend/Other provides transportation;Family/Friend/Other provides meals;Family/Friend/Other provides medication management   Falls in the last 6 months 0   Vocational On disability   Comments At baseline, pt required assist w/ ADLs (I w/ toileting), assist w/ IADLs, and Mod I for functional transfers/mobility w/ use of SPC. Pt reports use of Rollator x1 wk 2* weakness. (-) . Denies falls PTA   Lifestyle   Autonomy At baseline, pt required assist w/ ADLs (I w/ toileting), assist w/ IADLs, and Mod I for functional transfers/mobility w/ use of SPC. Pt reports use of Rollator x1 wk 2* weakness. (-) . Denies falls PTA   Reciprocal Relationships Dtr, dtr's S.O.   Service to Others On disability   ADL   Where Assessed Chair   Eating Assistance 7  Independent   Grooming Assistance 5  Supervision/Setup   UB Bathing Assistance 5  Supervision/Setup   LB Bathing Assistance 4  Minimal Assistance   UB Dressing Assistance 5  Supervision/Setup   LB Dressing Assistance 4  Minimal Assistance   Toileting Assistance  4  Minimal Assistance   Bed Mobility   Supine to Sit Unable to assess  (Pt seated EOB)   Sit to Supine Unable to assess   Additional Comments Resting SpO2: 99% on 2.5L O2. HR: 72 bpm. Pt seated OOB in chair with chair alarm activated at end of session. Call bell and phone within reach. All needs met and pt reports no further questions for OT at  this time.   Transfers   Sit to Stand 5  Supervision   Additional items Increased time required;Verbal cues   Stand to Sit 5  Supervision   Additional items Armrests;Increased time required;Verbal cues   Additional Comments Cues for safe technique and hand placement   Functional Mobility   Functional Mobility 5  Supervision   Additional Comments SpO2 post-mobility: 96% on RA   Additional items Rolling walker   Balance   Static Sitting Fair +   Dynamic Sitting Fair   Static Standing Fair   Dynamic Standing Fair -   Ambulatory Fair -   Activity Tolerance   Activity Tolerance Patient tolerated treatment well;Patient limited by fatigue   Medical Staff Made Aware GLADYS Corrigan   Nurse Made Aware yes; KEVIN Aguilar   RUE Assessment   RUE Assessment WFL  (4-/5 throughout)   LUE Assessment   LUE Assessment WFL  (4-/5 throughout)   Hand Function   Gross Motor Coordination Functional   Fine Motor Coordination Functional   Sensation   Light Touch Partial deficits in the RUE;Partial deficits in the LUE;Partial deficits in the RLE;Partial deficits in the LLE   Proprioception   Proprioception No apparent deficits   Vision-Basic Assessment   Current Vision Wears glasses all the time   Vision - Complex Assessment   Ocular Range of Motion Intact   Acuity Able to read clock/calendar on wall without difficulty;Able to read employee name badge without difficulty   Psychosocial   Psychosocial (WDL) WDL   Perception   Inattention/Neglect Appears intact   Cognition   Overall Cognitive Status WFL   Arousal/Participation Alert;Cooperative   Attention Within functional limits   Orientation Level Oriented X4   Memory Decreased recall of precautions   Following Commands Follows one step commands without difficulty   Comments Pleasant and cooperative   Assessment   Limitation Decreased ADL status;Decreased UE strength;Decreased endurance;Decreased self-care trans;Decreased high-level ADLs   Prognosis Good   Assessment Pt is a 63 y.o. female seen  for OT evaluation s/p adm to [facility] on [facility]/30[facility]2024 w/ Acute decompensated heart failure. Comorbidities affecting pt’s functional performance include a significant PMH of CAD s/p coronary artery stenting, ESRD on HD, Anemia, Anxiety, Arthritis, CHF, CKD, HTN, HLD, type 2 diabetes. Pt with active OT orders and activity orders for Up and OOB as tolerated. Pt lives with dtr and dtr's S.O. in a one level house with 4-6 DOROTHEA. Pt reports her dtr is her paid FT caregiver. (-) . At baseline, pt required assist w/ ADLs (I w/ toileting), assist w/ IADLs, and Mod I for functional transfers/mobility w/ use of SPC. Pt reports use of Rollator x1 wk 2* weakness. (-) . Denies falls PTA. Upon evaluation, pt currently requires Supervision for UB ADLs, Min A for LB ADLs, Min A for toileting, and Supervision for functional mobility/transfers 2* the following deficits impacting occupational performance: decreased strength , decreased balance, decreased activity tolerance, limited functional reach, impaired sensation, and increased pain. These impairments, as well at pt’s personal factors of: DOROTHEA home environment, difficulty performing ADLs, difficulty performing transfers/mobility, fall risk , functional decline , new O2 requirements, and increased reliance on DME  limit pt’s ability to safely engage in all baseline areas of occupation. Pt to continue to benefit from continued acute OT services during hospital stay to address defined deficits and to maximize level of functional independence in the following Occupational Performance areas: grooming, bathing/shower, toilet hygiene, dressing, health maintenance, functional mobility, community mobility, clothing management, and social participation. The patient's raw score on the AM-PAC Daily Activity Inpatient Short Form is 19. A raw score of greater than or equal to 19 suggests the patient may benefit from discharge to home. Please refer to the recommendation of the  Occupational Therapist for safe discharge planning. OT will continue to follow pt 2-4x/wk to address the following goals to  w/in 10-14 days:   Goals   Patient Goals To get stronger   LTG Time Frame 10-14   Long Term Goal Please refer to LTGs listed below   Plan   Treatment Interventions ADL retraining;Functional transfer training;UE strengthening/ROM;Endurance training;Patient/family training;Equipment evaluation/education;Compensatory technique education;Continued evaluation;Energy conservation;Activityengagement   Goal Expiration Date 24   OT Treatment Day 0   OT Frequency Other (comment)  (2-4x/wk)   Discharge Recommendation   Rehab Resource Intensity Level, OT III (Minimum Resource Intensity)  (pending progress and stair trial w/ PT)   AM-PAC Daily Activity Inpatient   Lower Body Dressing 3   Bathing 3   Toileting 3   Upper Body Dressing 3   Grooming 3   Eating 4   Daily Activity Raw Score 19   Daily Activity Standardized Score (Calc for Raw Score >=11) 40.22   AM-PAC Applied Cognition Inpatient   Following a Speech/Presentation 4   Understanding Ordinary Conversation 4   Taking Medications 3   Remembering Where Things Are Placed or Put Away 3   Remembering List of 4-5 Errands 3   Taking Care of Complicated Tasks 3   Applied Cognition Raw Score 20   Applied Cognition Standardized Score 41.76        GOALS    Pt will improve activity tolerance to G for min 30 min txment sessions for increase engagement in functional tasks    Pt will complete bed mobility at a Mod I level w/ G balance/safety demonstrated to decrease caregiver assistance required     Pt will complete UB dressing/self care w/ mod I using adaptive device and DME as needed     Pt will complete LB dressing/self care w/ mod I using adaptive device and DME as needed    Pt will complete toileting w/ mod I w/ G hygiene/thoroughness using DME as needed    Pt will improve functional transfers to Mod I on/off all surfaces using DME as needed w/ G  balance/safety     Pt will improve functional mobility during ADL/IADL/leisure tasks to Mod I using DME as needed w/ G balance/safety     Pt will be attentive 100% of the time during ongoing cognitive assessment w/ G participation to assist w/ safe d/c planning/recommendations    Pt will demonstrate G carryover of pt/caregiver education and training as appropriate w/o cues w/ good tolerance to increase safety during functional tasks    Pt will demonstrate 100% carryover of energy conservation techniques t/o functional I/ADL/leisure tasks w/o cues s/p skilled education to increase endurance during functional tasks    Pt will increase BUE strength by 1MM grade via AROM exercises to increase independence in ADLs and transfers    Pt will verbalize 3 potential fall hazards and identify appropriate compensatory techniques to decrease fall risk in home environment     Pt will increase standing tolerance to 5-8 mins with Fair+ dynamic standing balance to increase safety during participation in ADLs     Pt will be able to state 6/6 CHF management techniques and demonstrate ability to weigh oneself at a Mod I level after education from therapist to increase self management of CHF in home environment and reduce risk for readmission       Mechelle Pimentel, OTR/L

## 2024-08-31 NOTE — ASSESSMENT & PLAN NOTE
Wt Readings from Last 3 Encounters:   08/31/24 116 kg (256 lb 13.4 oz)   08/19/24 108 kg (237 lb)   08/02/24 109 kg (240 lb)     Patient reports generalized weakness.  Suspect volume overload secondary to increased fluid intake  Continue Lasix 120 twice daily  Volume management with hemodialysis  Given sick contact and generalized weakness will swab for COVID

## 2024-08-31 NOTE — ASSESSMENT & PLAN NOTE
History of coronary artery disease with multiple coronary artery stents  Significantly elevated troponin without chest pain  Suspect type II non-MI related troponin elevation in setting of acute heart failure  Continue medical management with Plavix, statin, heparin drip, Imdur  Cardiology consultation

## 2024-08-31 NOTE — PLAN OF CARE
Problem: PHYSICAL THERAPY ADULT  Goal: Performs mobility at highest level of function for planned discharge setting.  See evaluation for individualized goals.  Description: Treatment/Interventions: Functional transfer training, LE strengthening/ROM, Elevations, Therapeutic exercise, Endurance training, Patient/family training, Equipment eval/education, Bed mobility, Gait training, Spoke to nursing, OT  Equipment Recommended: Walker (pt owns rollator at home for use)       See flowsheet documentation for full assessment, interventions and recommendations.  Note: Prognosis: Fair  Problem List: Decreased strength, Decreased endurance, Impaired balance, Decreased mobility, Obesity, Decreased skin integrity, Pain  Assessment: Pt is a 62 yo female [facility] 2* acute CHF, weakness, anemia, hypoxia, ESRD, CKD, reports worsening SOB and weakness x1 week, 17 # weight gain in the past month. Pt lvies with primary and paid caregiver daughter in 1 SH, (+)DOROTHEA, reports no recent falls, use of walker PTA and pt reports needing A for ADLs and IADLs. Pt currently is not at functional mobility baseline, needs A for mobility with use of RW, use of 2 L NC O2 currently,ataxic and unsteady gait and movement pattern,chronic pain in back and B knees,multiple lines,masimo monitoring. Pt demonstrates limited mobility and gait 2* dec endurance, dec balance, dec BLE strength, ataxic and unsteady gait and movement pattern,pain, slow marilyn and movement and needs S TT and GT with use of RW. Pt would cont to benefit from skilled inpt PT services to maximize functional independence and to dec caregiver burden upon being DC from the hospital.  Barriers to Discharge: Inaccessible home environment (DOROTHEA)     Rehab Resource Intensity Level, PT: III (Minimum Resource Intensity)    See flowsheet documentation for full assessment.

## 2024-08-31 NOTE — PLAN OF CARE
Problem: PAIN - ADULT  Goal: Verbalizes/displays adequate comfort level or baseline comfort level  Description: Interventions:  - Encourage patient to monitor pain and request assistance  - Assess pain using appropriate pain scale  - Administer analgesics based on type and severity of pain and evaluate response  - Implement non-pharmacological measures as appropriate and evaluate response  - Consider cultural and social influences on pain and pain management  - Notify physician/advanced practitioner if interventions unsuccessful or patient reports new pain  Outcome: Progressing     Problem: INFECTION - ADULT  Goal: Absence or prevention of progression during hospitalization  Description: INTERVENTIONS:  - Assess and monitor for signs and symptoms of infection  - Monitor lab/diagnostic results  - Monitor all insertion sites, i.e. indwelling lines, tubes, and drains  - Monitor endotracheal if appropriate and nasal secretions for changes in amount and color  - [place] appropriate cooling/warming therapies per order  - Administer medications as ordered  - Instruct and encourage patient and family to use good hand hygiene technique  - Identify and instruct in appropriate isolation precautions for identified infection/condition  Outcome: Progressing     Problem: RESPIRATORY - ADULT  Goal: Achieves optimal ventilation and oxygenation  Description: INTERVENTIONS:  - Assess for changes in respiratory status  - Assess for changes in mentation and behavior  - Position to facilitate oxygenation and minimize respiratory effort  - Oxygen administered by appropriate delivery if ordered  - Initiate smoking cessation education as indicated  - Encourage broncho-pulmonary hygiene including cough, deep breathe, Incentive Spirometry  - Assess the need for suctioning and aspirate as needed  - Assess and instruct to report SOB or any respiratory difficulty  - Respiratory Therapy support as indicated  Outcome: Progressing     Problem:  CARDIOVASCULAR - ADULT  Goal: Maintains optimal cardiac output and hemodynamic stability  Description: INTERVENTIONS:  - Monitor I/O, vital signs and rhythm  - Monitor for S/S and trends of decreased cardiac output  - Administer and titrate ordered vasoactive medications to optimize hemodynamic stability  - Assess quality of pulses, skin color and temperature  - Assess for signs of decreased coronary artery perfusion  - Instruct patient to report change in severity of symptoms  Outcome: Progressing     Problem: GENITOURINARY - ADULT  Goal: Maintains or returns to baseline urinary function  Description: INTERVENTIONS:  - Assess urinary function  - Encourage oral fluids to ensure adequate hydration if ordered  - Administer IV fluids as ordered to ensure adequate hydration  - Administer ordered medications as needed  - Offer frequent toileting  - Follow urinary retention protocol if ordered  Outcome: Progressing     Problem: METABOLIC, FLUID AND ELECTROLYTES - ADULT  Goal: Electrolytes maintained within normal limits  Description: INTERVENTIONS:  - Monitor labs and assess patient for signs and symptoms of electrolyte imbalances  - Administer electrolyte replacement as ordered  - Monitor response to electrolyte replacements, including repeat lab results as appropriate  - Instruct patient on fluid and nutrition as appropriate  Outcome: Progressing

## 2024-08-31 NOTE — ASSESSMENT & PLAN NOTE
Lab Results   Component Value Date    EGFR 8 08/31/2024    EGFR 9 08/30/2024    EGFR 13 04/10/2024    CREATININE 5.19 (H) 08/31/2024    CREATININE 4.56 (H) 08/30/2024    CREATININE 3.51 (H) 04/10/2024     [facility] nephrology recommendations

## 2024-09-01 PROBLEM — U07.1 COVID-19: Status: ACTIVE | Noted: 2024-09-01

## 2024-09-01 LAB
ANION GAP SERPL CALCULATED.3IONS-SCNC: 11 MMOL/L (ref 4–13)
APTT PPP: 44 SECONDS (ref 23–34)
APTT PPP: 46 SECONDS (ref 23–34)
APTT PPP: 81 SECONDS (ref 23–34)
ATRIAL RATE: 75 BPM
ATRIAL RATE: 77 BPM
BACTERIA UR CULT: ABNORMAL
BACTERIA UR CULT: ABNORMAL
BUN SERPL-MCNC: 34 MG/DL (ref 5–25)
CALCIUM SERPL-MCNC: 7.9 MG/DL (ref 8.4–10.2)
CHLORIDE SERPL-SCNC: 96 MMOL/L (ref 96–108)
CO2 SERPL-SCNC: 28 MMOL/L (ref 21–32)
CREAT SERPL-MCNC: 3.74 MG/DL (ref 0.6–1.3)
ERYTHROCYTE [DISTWIDTH] IN BLOOD BY AUTOMATED COUNT: 13.2 % (ref 11.6–15.1)
GFR SERPL CREATININE-BSD FRML MDRD: 12 ML/MIN/1.73SQ M
GLUCOSE SERPL-MCNC: 144 MG/DL (ref 65–140)
GLUCOSE SERPL-MCNC: 145 MG/DL (ref 65–140)
GLUCOSE SERPL-MCNC: 158 MG/DL (ref 65–140)
GLUCOSE SERPL-MCNC: 163 MG/DL (ref 65–140)
GLUCOSE SERPL-MCNC: 202 MG/DL (ref 65–140)
HCT VFR BLD AUTO: 24.6 % (ref 34.8–46.1)
HGB BLD-MCNC: 8.2 G/DL (ref 11.5–15.4)
MAGNESIUM SERPL-MCNC: 2.2 MG/DL (ref 1.9–2.7)
MCH RBC QN AUTO: 33.9 PG (ref 26.8–34.3)
MCHC RBC AUTO-ENTMCNC: 33.3 G/DL (ref 31.4–37.4)
MCV RBC AUTO: 102 FL (ref 82–98)
MRSA NOSE QL CULT: NORMAL
P AXIS: 41 DEGREES
P AXIS: 65 DEGREES
PLATELET # BLD AUTO: 221 THOUSANDS/UL (ref 149–390)
PMV BLD AUTO: 9.7 FL (ref 8.9–12.7)
POTASSIUM SERPL-SCNC: 3.6 MMOL/L (ref 3.5–5.3)
PR INTERVAL: 150 MS
PR INTERVAL: 160 MS
QRS AXIS: -1 DEGREES
QRS AXIS: -3 DEGREES
QRSD INTERVAL: 166 MS
QRSD INTERVAL: 166 MS
QT INTERVAL: 440 MS
QT INTERVAL: 446 MS
QTC INTERVAL: 491 MS
QTC INTERVAL: 501 MS
RBC # BLD AUTO: 2.42 MILLION/UL (ref 3.81–5.12)
SODIUM SERPL-SCNC: 135 MMOL/L (ref 135–147)
T WAVE AXIS: 147 DEGREES
T WAVE AXIS: 181 DEGREES
VENTRICULAR RATE: 75 BPM
VENTRICULAR RATE: 76 BPM
WBC # BLD AUTO: 8.13 THOUSAND/UL (ref 4.31–10.16)

## 2024-09-01 PROCEDURE — 99232 SBSQ HOSP IP/OBS MODERATE 35: CPT | Performed by: INTERNAL MEDICINE

## 2024-09-01 PROCEDURE — 82948 REAGENT STRIP/BLOOD GLUCOSE: CPT

## 2024-09-01 PROCEDURE — 85027 COMPLETE CBC AUTOMATED: CPT | Performed by: INTERNAL MEDICINE

## 2024-09-01 PROCEDURE — 93010 ELECTROCARDIOGRAM REPORT: CPT | Performed by: INTERNAL MEDICINE

## 2024-09-01 PROCEDURE — 80048 BASIC METABOLIC PNL TOTAL CA: CPT | Performed by: INTERNAL MEDICINE

## 2024-09-01 PROCEDURE — 99233 SBSQ HOSP IP/OBS HIGH 50: CPT | Performed by: INTERNAL MEDICINE

## 2024-09-01 PROCEDURE — 85730 THROMBOPLASTIN TIME PARTIAL: CPT | Performed by: INTERNAL MEDICINE

## 2024-09-01 PROCEDURE — 83735 ASSAY OF MAGNESIUM: CPT | Performed by: INTERNAL MEDICINE

## 2024-09-01 RX ORDER — FUROSEMIDE 80 MG
160 TABLET ORAL DAILY
Status: DISCONTINUED | OUTPATIENT
Start: 2024-09-02 | End: 2024-09-06 | Stop reason: HOSPADM

## 2024-09-01 RX ADMIN — FUROSEMIDE 120 MG: 10 INJECTION, SOLUTION INTRAVENOUS at 08:37

## 2024-09-01 RX ADMIN — CITALOPRAM HYDROBROMIDE 40 MG: 20 TABLET ORAL at 08:37

## 2024-09-01 RX ADMIN — LEVOTHYROXINE SODIUM 50 MCG: 50 TABLET ORAL at 05:12

## 2024-09-01 RX ADMIN — OLANZAPINE 5 MG: 5 TABLET, FILM COATED ORAL at 21:01

## 2024-09-01 RX ADMIN — ALLOPURINOL 200 MG: 100 TABLET ORAL at 08:37

## 2024-09-01 RX ADMIN — ISOSORBIDE MONONITRATE 30 MG: 30 TABLET, EXTENDED RELEASE ORAL at 17:08

## 2024-09-01 RX ADMIN — DOCUSATE SODIUM 100 MG: 100 CAPSULE, LIQUID FILLED ORAL at 17:09

## 2024-09-01 RX ADMIN — SEVELAMER HYDROCHLORIDE 1600 MG: 800 TABLET ORAL at 08:37

## 2024-09-01 RX ADMIN — INSULIN LISPRO 1 UNITS: 100 INJECTION, SOLUTION INTRAVENOUS; SUBCUTANEOUS at 17:15

## 2024-09-01 RX ADMIN — LOSARTAN POTASSIUM 25 MG: 25 TABLET, FILM COATED ORAL at 08:38

## 2024-09-01 RX ADMIN — PANTOPRAZOLE SODIUM 40 MG: 40 TABLET, DELAYED RELEASE ORAL at 05:12

## 2024-09-01 RX ADMIN — SEVELAMER HYDROCHLORIDE 1600 MG: 800 TABLET ORAL at 17:08

## 2024-09-01 RX ADMIN — HEPARIN SODIUM 14.1 UNITS/KG/HR: 10000 INJECTION, SOLUTION INTRAVENOUS at 21:06

## 2024-09-01 RX ADMIN — ASPIRIN 81 MG: 81 TABLET, COATED ORAL at 08:38

## 2024-09-01 RX ADMIN — INSULIN LISPRO 1 UNITS: 100 INJECTION, SOLUTION INTRAVENOUS; SUBCUTANEOUS at 21:02

## 2024-09-01 RX ADMIN — REMDESIVIR 100 MG: 100 INJECTION, POWDER, LYOPHILIZED, FOR SOLUTION INTRAVENOUS at 17:08

## 2024-09-01 RX ADMIN — CLOPIDOGREL BISULFATE 75 MG: 75 TABLET ORAL at 08:37

## 2024-09-01 RX ADMIN — HEPARIN SODIUM 2000 UNITS: 1000 INJECTION INTRAVENOUS; SUBCUTANEOUS at 08:57

## 2024-09-01 RX ADMIN — INSULIN GLARGINE 20 UNITS: 100 INJECTION, SOLUTION SUBCUTANEOUS at 12:36

## 2024-09-01 RX ADMIN — DOCUSATE SODIUM 100 MG: 100 CAPSULE, LIQUID FILLED ORAL at 08:37

## 2024-09-01 RX ADMIN — OXYCODONE HYDROCHLORIDE 5 MG: 5 TABLET ORAL at 15:59

## 2024-09-01 RX ADMIN — INSULIN LISPRO 2 UNITS: 100 INJECTION, SOLUTION INTRAVENOUS; SUBCUTANEOUS at 12:37

## 2024-09-01 RX ADMIN — SENNOSIDES AND DOCUSATE SODIUM 1 TABLET: 50; 8.6 TABLET ORAL at 08:37

## 2024-09-01 RX ADMIN — ATORVASTATIN CALCIUM 80 MG: 80 TABLET, FILM COATED ORAL at 08:37

## 2024-09-01 RX ADMIN — SEVELAMER HYDROCHLORIDE 1600 MG: 800 TABLET ORAL at 13:04

## 2024-09-01 RX ADMIN — ACETAMINOPHEN 325MG 650 MG: 325 TABLET ORAL at 17:08

## 2024-09-01 RX ADMIN — HEPARIN SODIUM 2000 UNITS: 1000 INJECTION INTRAVENOUS; SUBCUTANEOUS at 15:54

## 2024-09-01 NOTE — ASSESSMENT & PLAN NOTE
Lab Results   Component Value Date    EGFR 12 09/01/2024    EGFR 8 08/31/2024    EGFR 9 08/30/2024    CREATININE 3.74 (H) 09/01/2024    CREATININE 5.19 (H) 08/31/2024    CREATININE 4.56 (H) 08/30/2024     [facility] nephrology recommendations

## 2024-09-01 NOTE — UTILIZATION REVIEW
Initial Clinical Review    Admission: Date/Time/Statement:   Admission Orders (From admission, onward)       Ordered        08/30/24 1720  INPATIENT ADMISSION  Once                          Orders Placed This Encounter   Procedures    INPATIENT ADMISSION     Standing Status:   Standing     Number of Occurrences:   1     Order Specific Question:   Level of Care     Answer:   Med Surg [16]     Order Specific Question:   Estimated length of stay     Answer:   More than 2 Midnights     Order Specific Question:   Certification     Answer:   I certify that inpatient services are medically necessary for this patient for a duration of greater than two midnights. See H&P and MD Progress Notes for additional information about the patient's course of treatment.     ED Arrival Information       Expected   -    Arrival   8/30/2024 14:04    Acuity   Urgent              Means of arrival   Ambulance    Escorted by   [facility] ([facility])    Service   Hospitalist    Admission type   Emergency              Arrival complaint   Weakness             Chief Complaint   Patient presents with    Weakness - Generalized     Pt c/o generalized weakness for the past x 1 week. Pt denies cp/sob/v/d or fevers. Pt states she does have nausea. Pt just had dialysis yesterday.       Initial Presentation: 63 y.o. female to ED by ems admitted Inpatient d/t  Hypertension. PMH of coronary artery disease (status post coronary artery stenting), ESRD (on HD T, T, S), anemia, hypertension, hyperlipidemia, type 2 diabetes who presents with worsening weakness and shortness of breath x 1 week.  Patient reports having worsening weakness to where she was unable to climb stairs.   O2 SAT 89%. . BUN 50, CREAT 4.56. , ALT 91. BNP 3596.PT 17, INR 1.37, PTT 38.. TROPONIN 18,297.HGB 9.6, HCT 28.1. + COVID.urine cx +gram -rods.IV heparin gtt, IV remdesivir.losartan; will hold hydralazine.O2@2L.    Anticipated Length of Stay/Certification  Statement: Patient will be admitted on an inpatient basis with an anticipated length of stay of greater than 2 midnights secondary to acute decompensated heart failure.     Date: 8/31   Day 2: still feeling fatigued. Denies any fevers or chills. Reports that her daughter was recently in the hospital and did have some upper respiratory tract symptoms. IV heparin gtt, IV remdesivir.losartan.    8/31 NEPHROLOGY CONSULT:ESRD on HD. TTS at [facility]. .5 kg. May need to decrease dry weight, last treatment 08/29 patient started treatment at her dry weight no fluid was removed but now admitted with volume overload. We will try for 2 to 3 L UF as tolerated on dialysis. Access is left upper extremity AV fistula. Dyspnea. Chest x-ray with small bilateral pleural effusions and mild to moderate pulmonary interstitial edema. Decrease dry weight as above. She does make urine and on IV Lasix. Hypertension with intradialytic hypotension. Continue Lasix, Imdur, losartan. Continue midodrine. Anemia in CKD. Outpatient Mircera 50 mcg every 2 weeks and Venofer 50 mg weekly. Secondary hyperparathyroidism in CKD. Continue Renagel.ACS. Currently on IV heparin per cardiology.     8/31 CARDIOLOGY CONSULT:Volume overload  Chronic HFrEF  LVEF 38%, moderate LVH, probable diastolic dysfunction, mild LA dilatation, AV sclerosis, trace AR, MV sclerosis, mild MR/TR, small pericardial effusion, June 2022  Elevated troponin with precordial chest pain  Ischemic cardiomyopathy  CAD   s/p cath w/ JANET-mLCx, moderate diffuse LAD and 95% small RCA (treated medically), July 2022  s/p PCI x5 to unknown vessels in 2012 and 2017 in [place]  Type 2 diabetes mellitus  Hypertension  Dyslipidemia  ESRD on HD  Neurogenic bladder with suprapubic catheter     PLAN:  Patient has some degree of volume overload with pulmonary edema and elevated troponins  Will treat as ACS pending further evaluation  Patient is now symptom-free and ECG shows chronic  LBBB  Check 2D echocardiogram to assess cardiac structure and function  Heparin, aspirin, high intensity statin, Plavix  Continue losartan and isosorbide  Hemodialysis as per nephrology  Continue Lasix 120 mg IV twice daily  Strict I's/O's and daily weights  Keep potassium greater than 4 magnesium greater than 2      Date: 9/1  Day 3: Has surpassed a 2nd midnight with active treatments and services.Feeling little better with less shortness of breath. Still coughing up some mucus. IV heparin gtt, IV remdesivir.losartan.        ED Triage Vitals [08/30/24 1416]   Temperature Pulse Respirations Blood Pressure SpO2 Pain Score   97.6 °F (36.4 °C) 78 16 121/58 94 % 6     Weight (last 2 days)       Date/Time Weight    09/01/24 0543 116 (256.62)    08/31/24 0555 116 (256.84)    08/30/24 2112 117 (257.94)    08/30/24 1416 117 (257.06)            Vital Signs (last 3 days)       Date/Time Temp Pulse Resp BP MAP (mmHg) SpO2 Calculated FIO2 (%) - Nasal Cannula Nasal Cannula O2 Flow Rate (L/min) O2 Device Patient Position - Orthostatic VS Pain    09/01/24 1121 97 °F (36.1 °C) 71 18 157/64 -- 97 % -- -- None (Room air) Sitting --    09/01/24 0742 97 °F (36.1 °C) 74 16 139/79 -- 93 % -- -- None (Room air) Lying --    08/31/24 2310 97.9 °F (36.6 °C) 72 16 144/76 94 97 % -- -- None (Room air) Lying --    08/31/24 1931 97.9 °F (36.6 °C) 74 18 125/67 86 100 % -- -- None (Room air) Lying --    08/31/24 1929 -- -- -- -- -- -- -- -- -- -- 6    08/31/24 1915 -- -- -- -- -- -- -- -- -- -- No Pain    08/31/24 1720 97.4 °F (36.3 °C) 70 18 126/63 -- -- -- -- -- Lying --    08/31/24 1700 -- 71 20 124/55 -- -- -- -- -- -- --    08/31/24 1630 -- 70 18 120/61 -- -- -- -- -- -- --    08/31/24 1600 -- 75 18 125/59 -- -- -- -- -- -- --    08/31/24 1530 -- 76 18 100/46 -- -- -- -- -- -- --    08/31/24 1500 -- 72 18 109/55 -- -- -- -- -- -- --    08/31/24 1430 -- 68 18 114/59 -- -- -- -- -- -- --    08/31/24 1415 -- 69 18 120/61 -- -- -- -- -- -- --     08/31/24 1410 -- 73 18 116/59 -- -- -- -- -- Lying --    08/31/24 1108 97.1 °F (36.2 °C) 69 18 118/60 82 97 % -- -- -- Sitting --    08/31/24 0952 -- -- -- -- -- -- -- -- -- -- 6    08/31/24 0949 -- -- -- -- -- -- -- -- -- -- 6    08/31/24 0845 -- -- -- -- -- -- -- -- -- -- No Pain    08/31/24 0736 97 °F (36.1 °C) 71 18 133/63 89 95 % 28 2 L/min Nasal cannula Lying --    08/31/24 0300 97.7 °F (36.5 °C) 68 16 108/64 74 -- 28 2 L/min Nasal cannula Lying --    08/30/24 2323 -- -- -- -- -- -- -- -- -- -- No Pain    08/30/24 2300 97.7 °F (36.5 °C) 69 16 110/68 83 95 % 28 2 L/min Nasal cannula Lying --    08/30/24 2244 -- -- -- -- -- -- -- -- -- -- 8    08/30/24 2112 -- -- -- -- -- 96 % 28 2 L/min Nasal cannula -- --    08/30/24 2100 -- -- -- -- -- -- -- -- -- -- No Pain    08/30/24 2031 97.5 °F (36.4 °C) 80 17 110/58 79 94 % 28 2 L/min Nasal cannula Lying --    08/30/24 2024 -- 82 16 108/57 -- 99 % 28 2 L/min Nasal cannula Lying --    08/30/24 1830 -- 70 16 115/59 79 98 % 28 2 L/min Nasal cannula Lying --    08/30/24 1635 -- 79 18 109/60 -- 98 % 28 2 L/min Nasal cannula Lying --    08/30/24 1536 -- 77 18 118/62 -- 96 % 28 2 L/min Nasal cannula Lying 6    08/30/24 1535 -- -- -- -- -- 89 % -- -- None (Room air) -- --    08/30/24 1419 -- -- -- -- -- -- -- -- None (Room air) -- --    08/30/24 1416 97.6 °F (36.4 °C) 78 16 121/58 -- 94 % -- -- None (Room air) Lying 6              Pertinent Labs/Diagnostic Test Results:   Radiology:  XR chest 2 views   ED Interpretation by Doyle Chris MD (08/30 1626)   Independently reviewed: pulmonary interstitial edema.      Final Interpretation by Jaime Mckeon MD (08/30 7852)      Small bilateral pleural effusions and mild to moderate pulmonary interstitial edema.            Workstation performed: [id number]           Cardiology:  ECG 12 lead   Final Result by Alexander Scherer DO (09/01 1019)   Age and gender specific ECG analysis    Normal sinus rhythm   Left bundle branch  block   Abnormal ECG   When compared with ECG of 30-AUG-2024 15:35, (unconfirmed)   No significant change was found   Confirmed by Alexander Scherer ([id number]) on 9/1/2024 10:19:32 AM      ECG 12 lead   Final Result by Alexander Scherer DO (09/01 1019)   Age and gender specific ECG analysis    Normal sinus rhythm   Left bundle branch block   Abnormal ECG   When compared with ECG of 17-JAN-2023 10:06,   QRS duration has increased   T wave inversion more evident in Lateral leads   Confirmed by Alexander Scherer ([id number]) on 9/1/2024 10:19:30 AM      8/30 ECHO:PENDING        Narrative    Doyle Chris MD     8/30/2024  5:20 PM  ECG 12 Lead Documentation Only    Date/Time: 8/30/2024 3:50 PM    Performed by: Doyle Chris MD  Authorized by: Doyle Chris MD    ECG reviewed by me, the ED Provider: yes    Patient location:  ED  Previous ECG:    Previous ECG:  Compared to current    Comparison ECG info:  1/17/23    Similarity:  No change  Rate:    ECG rate:  75    ECG rate assessment: normal    Rhythm:    Rhythm: sinus rhythm    Ectopy:    Ectopy: none    QRS:    QRS axis:  Normal    QRS intervals:  Normal  Conduction:    Conduction: abnormal      Abnormal conduction: complete LBBB    ST segments:    ST segments:  Normal  T waves:    T waves: normal                Normal sinus rhythm  Left bundle branch block  Abnormal ECG  When compared with ECG of 30-AUG-2024 15:35, (unconfirmed)  No significant change was found  Confirmed by Alexander Scherer ([id number]) on 9/1/2024 10:19:32 AM    Normal sinus rhythm  Left bundle branch block  Abnormal ECG  When compared with ECG of 17-JAN-2023 10:06,  QRS duration has increased  T wave inversion more evident in Lateral leads  Confirmed by Alexander Scherer ([id number]) on 9/1/2024 10:19:30 AM        Results from last 7 days   Lab Units 08/31/24  1409   SARS-COV-2  Positive*     Results from last 7 days   Lab Units 09/01/24  0551 08/31/24  0545 08/31/24  0023 08/30/24  1528   WBC Thousand/uL 8.13 7.31  8.21 8.02   HEMOGLOBIN g/dL 8.2* 8.3* 8.7* 9.6*   HEMATOCRIT % 24.6* 24.8* 24.9* 28.1*   PLATELETS Thousands/uL 221 206 226 225   TOTAL NEUT ABS Thousands/µL  --  4.46  --  6.10         Results from last 7 days   Lab Units 09/01/24  0551 08/31/24  0545 08/30/24  1528   SODIUM mmol/L 135 132* 131*   POTASSIUM mmol/L 3.6 3.7 3.9   CHLORIDE mmol/L 96 92* 90*   CO2 mmol/L 28 25 23   ANION GAP mmol/L 11 15* 18*   BUN mg/dL 34* 55* 50*   CREATININE mg/dL 3.74* 5.19* 4.56*   EGFR ml/min/1.73sq m 12 8 9   CALCIUM mg/dL 7.9* 8.0* 8.4   MAGNESIUM mg/dL 2.2 2.7  --    PHOSPHORUS mg/dL  --  5.2*  --      Results from last 7 days   Lab Units 08/31/24  0545 08/30/24  1528   AST U/L 223* 188*   ALT U/L 140* 91*   ALK PHOS U/L 85 93   TOTAL PROTEIN g/dL 6.2* 6.8   ALBUMIN g/dL 3.5 3.6   TOTAL BILIRUBIN mg/dL 0.56 0.67     Results from last 7 days   Lab Units 09/01/24  1117 09/01/24  0739 08/31/24  2110 08/31/24  1603 08/31/24  1106   POC GLUCOSE mg/dl 202* 145* 157* 113 206*     Results from last 7 days   Lab Units 09/01/24  0551 08/31/24  0545 08/30/24  1528   GLUCOSE RANDOM mg/dL 144* 170* 213*         Results from last 7 days   Lab Units 08/31/24  1412   HEMOGLOBIN A1C % 6.5*   EAG mg/dl 140       Results from last 7 days   Lab Units 08/30/24  1932 08/30/24  1733 08/30/24  1528   HS TNI 0HR ng/L  --   --  18,297*   HS TNI 2HR ng/L  --  16,953*  --    HSTNI D2 ng/L  --  -1,344  --    HS TNI 4HR ng/L 17,101*  --   --    HSTNI D4 ng/L -1,196  --   --          Results from last 7 days   Lab Units 09/01/24  0551 08/31/24  2140 08/31/24  1412 08/31/24  0545 08/30/24  2236   PROTIME seconds  --   --   --   --  17.0*   INR   --   --   --   --  1.37*   PTT seconds 44* >210* 65*   < > 38*    < > = values in this interval not displayed.     Results from last 7 days   Lab Units 08/30/24  1528   TSH 3RD GENERATON uIU/mL 1.798       Results from last 7 days   Lab Units 08/30/24  1528   BNP pg/mL 3,596*       Results from last 7 days   Lab  Units 08/30/24  1854   CLARITY UA  Clear   COLOR UA  Yellow   SPEC GRAV UA  >=1.030   PH UA  5.0   GLUCOSE UA mg/dl Negative   KETONES UA mg/dl 15 (1+)*   BLOOD UA  Moderate*   PROTEIN UA mg/dl >=300*   NITRITE UA  Negative   BILIRUBIN UA  Moderate*   UROBILINOGEN UA E.U./dl 0.2   LEUKOCYTES UA  Small*   WBC UA /hpf Innumerable*   RBC UA /hpf 4-10*   BACTERIA UA /hpf Moderate*   EPITHELIAL CELLS WET PREP /hpf Occasional   MUCUS THREADS  Occasional*     Results from last 7 days   Lab Units 08/31/24  1409   INFLUENZA A PCR  Negative   INFLUENZA B PCR  Negative   RSV PCR  Negative       Results from last 7 days   Lab Units 08/30/24  1854   URINE CULTURE  >100,000 cfu/ml Gram Negative Mason*       ED Treatment-Medication Administration from 08/30/2024 1404 to 08/30/2024 2030         Date/Time Order Dose Route Action     08/30/2024 1529 multi-electrolyte (ISOLYTE-S PH 7.4) bolus 1,000 mL 1,000 mL Intravenous New Bag     08/30/2024 1633 aspirin chewable tablet 324 mg 324 mg Oral Given     08/30/2024 1647 furosemide (LASIX) injection 40 mg 40 mg Intravenous Given            Past Medical History:   Diagnosis Date    Abnormal liver function     Anemia     Anxiety     Arthritis     CHF (congestive heart failure) ([facility])     Chronic kidney disease     stage 3    Chronic narcotic dependence ([facility])     Chronic pain disorder     lower back, hands , neck and knees    Coronary artery disease     Depression     Diabetes mellitus ([facility])     Elevated troponin 02/11/2022    GERD (gastroesophageal reflux disease)     no meds at present    Heart murmur     murmur    Hyperlipidemia     Hypertension     Neurogenic bladder     Open toe wound 12/2020    right big toe open calus but is dry at present    PONV (postoperative nausea and vomiting)     Renal disorder     Shortness of breath     exertion    Skin ulcer of hand, limited to breakdown of skin ([facility]) 02/25/2021    Sleep apnea     doesn't use cpap    Suprapubic catheter ([facility])     Urinary  retention      Present on Admission:   Hypertension   Mixed hyperlipidemia   Coronary artery disease with stable angina pectoris (HCC)   Chronic obstructive pulmonary disease, unspecified COPD type (HCC)   ESRD (end stage renal disease) ([facility])   Acute decompensated heart failure ([facility])   Neurogenic bladder      Admitting Diagnosis: Acute congestive heart failure ([facility]) [I50.9]  Weakness [R53.1]  Anemia [D64.9]  Hypoxia [R09.02]  ESRD (end stage renal disease) ([facility]) [N18.6]  CKD (chronic kidney disease) [N18.9]  Elevated troponin [R79.89]  ALFRED (acute kidney injury) ([facility]) [N17.9]  Age/Sex: 63 y.o. female  Admission Orders:  Scheduled Medications:  allopurinol, 200 mg, Oral, Daily  aspirin, 81 mg, Oral, Daily  atorvastatin, 80 mg, Oral, Daily  citalopram, 40 mg, Oral, Daily  clopidogrel, 75 mg, Oral, Daily  docusate sodium, 100 mg, Oral, BID  ergocalciferol, 50,000 Units, Oral, Once per day on Monday Wednesday Friday  [START ON 9/2/2024] furosemide, 160 mg, Oral, Daily  insulin glargine, 20 Units, Subcutaneous, QAM  insulin lispro, 1-6 Units, Subcutaneous, 4x Daily (AC & HS)  isosorbide mononitrate, 30 mg, Oral, QPM  levothyroxine, 50 mcg, Oral, Daily Before Breakfast  losartan, 25 mg, Oral, Daily  OLANZapine, 5 mg, Oral, HS  pantoprazole, 40 mg, Oral, Early Morning  remdesivir, 100 mg, Intravenous, Q24H  senna-docusate sodium, 1 tablet, Oral, Daily  sevelamer, 1,600 mg, Oral, TID With Meals      Continuous IV Infusions:  heparin (porcine), 3-20 Units/kg/hr (Order-Specific), Intravenous, Titrated      PRN Meds:  acetaminophen, 650 mg, Oral, Q4H PRN  heparin (porcine), 2,000 Units, Intravenous, Q6H PRN 8/31 X 1, 9/1 X 1  heparin (porcine), 4,000 Units, Intravenous, Q6H PRN  lactulose, 20 g, Oral, Daily PRN  midodrine, 5 mg, Oral, Before Dialysis 8/31 X 1  ondansetron, 4 mg, Intravenous, Q6H PRN  oxyCODONE, 10 mg, Oral, Q4H PRN 8/30 X 1  oxyCODONE, 5 mg, Oral, Q4H PRN 8/31 X 1    TELE  CONTACT AND [employer]ORNE  ISOLATION  PT/OT  I&O  OOB  DAILY WEIGHTS  CONS CARB DIET      IP CONSULT TO NEPHROLOGY  IP CONSULT TO CARDIOLOGY    Network Utilization Review Department  ATTENTION: Please call with any questions or concerns to [phone] and carefully listen to the prompts so that you are directed to the right person. All voicemails are confidential.   For Discharge needs, contact Care Management DC Support Team at [phone] opt. [phone]  Send all requests for admission clinical reviews, approved or denied determinations and any other requests to dedicated fax number below belonging to the [place] where the patient is receiving treatment. List of dedicated fax numbers for the Facilities:  FACILITY NAME UR FAX NUMBER   ADMISSION DENIALS (Administrative/Medical Necessity) [phone]   DISCHARGE SUPPORT TEAM (NETWORK) [phone]   PARENT CHILD HEALTH (Maternity/NICU/Pediatrics) [phone]   [facility] [phone]   [facility] [phone]   [facility] [phone]   [facility] [phone]   [facility] [phone]   [facility] [phone]   [facility] [phone]   [facility] [phone]   [facility] [phone]   [facility] [phone]   [facility] [phone]   [facility] [phone]

## 2024-09-01 NOTE — ASSESSMENT & PLAN NOTE
Wt Readings from Last 3 Encounters:   09/01/24 116 kg (256 lb 9.9 oz)   08/19/24 108 kg (237 lb)   08/02/24 109 kg (240 lb)     Patient reports generalized weakness.  Suspect volume overload secondary to increased fluid intake  Continue Lasix  Continue volume removal with hemodialysis

## 2024-09-01 NOTE — PROGRESS NOTES
NEPHROLOGY PROGRESS NOTE   Dee Dee Shaffer 63 y.o. female MRN: [id number]  Unit/Bed#: E4 -01 Encounter: [id number]      ASSESSMENT/PLAN:  ESRD on HD TTS at [facility]  Next HD Tuesday  Decrease EDW to 108 kg  Access: Left upper extremity AV fistula  Shortness of breath in the setting of volume overload and COVID  Chest x-ray: Small bilateral pleural effusions and mild to moderate pulmonary interstitial edema  Decrease dry weight as above  Will stop IV Lasix and changed back to p.o.  Hypertension with intradialytic hypotension  Continue Lasix, Imdur 30 mg daily, losartan 25 mg daily  Midodrine 5 mg pre-HD  Anemia of CKD  Outpatient Mircera 50 mcg every 2 weeks and Venofer 50 mg weekly  Mineral bone disease of CKD/secondary hyperparathyroidism  Renagel 2 tabs 3 times daily with meals    Plan Summary:   Next HD Tuesday  Can DC IV Lasix and resume her home dose  Okay to discharge from a nephrology standpoint when cleared by primary team    SUBJECTIVE:  Feeling little better with less shortness of breath.  Still coughing up some mucus.    OBJECTIVE:  Current Weight: Weight - Scale: 116 kg (256 lb 9.9 oz)  Vitals:    09/01/24 0742   BP: 139/79   Pulse: 74   Resp: 16   Temp: (!) 97 °F (36.1 °C)   SpO2: 93%       Intake/Output Summary (Last 24 hours) at 9/1/2024 1056  Last data filed at 8/31/2024 2145  Gross per 24 hour   Intake 1220 ml   Output 3300 ml   Net -2080 ml       General:  appears comfortable and in no acute distress   Skin:  No rash  Eyes:  Sclerae anicteric, no periorbital edema   ENT:  Moist mucous membranes  Neck:  Trachea midline, symmetric   Chest: Crackles bilaterally  CVS:  Regular rate and rhythm  Abdomen:  Soft, nontender, nondistended  Neuro:  Awake and alert  Psych:  Appropriate affect  Extremities: no lower extremity edema         Medications:  Scheduled Meds:  Current Facility-Administered Medications   Medication Dose Route Frequency Provider Last Rate    acetaminophen  650 mg Oral  Q4H PRN Ismael Rivas MD      allopurinol  200 mg Oral Daily Ismael Rivas MD      aspirin  81 mg Oral Daily Ismael Rivas MD      atorvastatin  80 mg Oral Daily Alexander Scherer,       citalopram  40 mg Oral Daily Ismael Rivas MD      clopidogrel  75 mg Oral Daily Ismael Rivas MD      docusate sodium  100 mg Oral BID Ismael Rivas MD      ergocalciferol  50,000 Units Oral Once per day on Monday Wednesday Friday Ismael Rivas MD      furosemide  120 mg Intravenous BID Ismael Rivas MD      heparin (porcine)  3-20 Units/kg/hr (Order-Specific) Intravenous Titrated Ismael Rivas MD 12.1 Units/kg/hr (09/01/24 0856)    heparin (porcine)  2,000 Units Intravenous Q6H PRN Ismael Rivas MD      heparin (porcine)  4,000 Units Intravenous Q6H PRN Ismael Rivas MD      insulin glargine  20 Units Subcutaneous QAM Ismael Rivas MD      insulin lispro  1-6 Units Subcutaneous 4x Daily (AC & HS) Garcia Lopez DO      isosorbide mononitrate  30 mg Oral QPM sImael Rivas MD      lactulose  20 g Oral Daily PRN Ismael Rivas MD      levothyroxine  50 mcg Oral Daily Before Breakfast Ismael Rivas MD      losartan  25 mg Oral Daily Ismael Rivas MD      midodrine  5 mg Oral Before Dialysis Ismael Rivas MD      OLANZapine  5 mg Oral HS Ismael Rivas MD      ondansetron  4 mg Intravenous Q6H PRN Ismael Rivas MD      oxyCODONE  10 mg Oral Q4H PRN Ismael Rivas MD      oxyCODONE  5 mg Oral Q4H PRN Ismael Rivas MD      pantoprazole  40 mg Oral Early Morning Ismael Rivas MD      remdesivir  100 mg Intravenous Q24H Garcia Lopez DO      senna-docusate sodium  1 tablet Oral Daily Ismael Rivas MD      sevelamer  1,600 mg Oral TID With Meals Birgit Olguin PA-C         PRN Meds:.  acetaminophen    heparin (porcine)    heparin (porcine)    lactulose    midodrine    ondansetron    oxyCODONE    oxyCODONE    Continuous Infusions:heparin (porcine), 3-20 Units/kg/hr (Order-Specific), Last Rate: 12.1 Units/kg/hr (09/01/24  0856)        Laboratory Results:  Results from last 7 days   Lab Units 09/01/24  0551 08/31/24  0545 08/31/24  0023 08/30/24  1528   WBC Thousand/uL 8.13 7.31 8.21 8.02   HEMOGLOBIN g/dL 8.2* 8.3* 8.7* 9.6*   HEMATOCRIT % 24.6* 24.8* 24.9* 28.1*   PLATELETS Thousands/uL 221 206 226 225   SODIUM mmol/L 135 132*  --  131*   POTASSIUM mmol/L 3.6 3.7  --  3.9   CHLORIDE mmol/L 96 92*  --  90*   CO2 mmol/L 28 25  --  23   BUN mg/dL 34* 55*  --  50*   CREATININE mg/dL 3.74* 5.19*  --  4.56*   CALCIUM mg/dL 7.9* 8.0*  --  8.4   MAGNESIUM mg/dL 2.2 2.7  --   --    PHOSPHORUS mg/dL  --  5.2*  --   --

## 2024-09-01 NOTE — PLAN OF CARE
Problem: Potential for Falls  Goal: Patient will remain free of falls  Description: INTERVENTIONS:  - Educate patient/family on patient safety including physical limitations  - Instruct patient to call for assistance with activity   - Consult OT/PT to assist with strengthening/mobility   - Keep Call bell within reach  - Keep bed low and locked with side rails adjusted as appropriate  - Keep care items and personal belongings within reach  - Initiate and maintain comfort rounds  - Make Fall Risk Sign visible to staff  - Offer Toileting every 2 Hours, in advance of need  - Initiate/Maintain bed alarm  - Obtain necessary fall risk management equipment: In place   - Apply yellow socks and bracelet for high fall risk patients  - Consider moving patient to room near nurses station  Outcome: Progressing     Problem: Prexisting or High Potential for Compromised Skin Integrity  Goal: Skin integrity is maintained or improved  Description: INTERVENTIONS:  - Identify patients at risk for skin breakdown  - Assess and monitor skin integrity  - Assess and monitor nutrition and hydration status  - Monitor labs   - Assess for incontinence   - Turn and reposition patient  - Assist with mobility/ambulation  - Relieve pressure over bony prominences  - Avoid friction and shearing  - Provide appropriate hygiene as needed including keeping skin clean and dry  - Evaluate need for skin moisturizer/barrier cream  - Collaborate with interdisciplinary team   - Patient/family teaching  - Consider wound care consult   Outcome: Progressing     Problem: PAIN - ADULT  Goal: Verbalizes/displays adequate comfort level or baseline comfort level  Description: Interventions:  - Encourage patient to monitor pain and request assistance  - Assess pain using appropriate pain scale  - Administer analgesics based on type and severity of pain and evaluate response  - Implement non-pharmacological measures as appropriate and evaluate response  - Consider  cultural and social influences on pain and pain management  - Notify physician/advanced practitioner if interventions unsuccessful or patient reports new pain  Outcome: Progressing     Problem: INFECTION - ADULT  Goal: Absence or prevention of progression during hospitalization  Description: INTERVENTIONS:  - Assess and monitor for signs and symptoms of infection  - Monitor lab/diagnostic results  - Monitor all insertion sites, i.e. indwelling lines, tubes, and drains  - Monitor endotracheal if appropriate and nasal secretions for changes in amount and color  - [place] appropriate cooling/warming therapies per order  - Administer medications as ordered  - Instruct and encourage patient and family to use good hand hygiene technique  - Identify and instruct in appropriate isolation precautions for identified infection/condition  Outcome: Progressing  Goal: Absence of fever/infection during neutropenic period  Description: INTERVENTIONS:  - Monitor WBC    Outcome: Progressing     Problem: SAFETY ADULT  Goal: Patient will remain free of falls  Description: INTERVENTIONS:  - Educate patient/family on patient safety including physical limitations  - Instruct patient to call for assistance with activity   - Consult OT/PT to assist with strengthening/mobility   - Keep Call bell within reach  - Keep bed low and locked with side rails adjusted as appropriate  - Keep care items and personal belongings within reach  - Initiate and maintain comfort rounds  - Make Fall Risk Sign visible to staff  - Offer Toileting every 2 Hours, in advance of need  - Initiate/Maintain bed alarm  - Obtain necessary fall risk management equipment: In place   - Apply yellow socks and bracelet for high fall risk patients  - Consider moving patient to room near nurses station  Outcome: Progressing  Goal: Maintain or return to baseline ADL function  Description: INTERVENTIONS:  -  Assess patient's ability to carry out ADLs; assess patient's baseline  for ADL function and identify physical deficits which impact ability to perform ADLs (bathing, care of mouth/teeth, toileting, grooming, dressing, etc.)  - Assess/evaluate cause of self-care deficits   - Assess range of motion  - Assess patient's mobility; develop plan if impaired  - Assess patient's need for assistive devices and provide as appropriate  - Encourage maximum independence but intervene and supervise when necessary  - Involve family in performance of ADLs  - Assess for home care needs following discharge   - Consider OT consult to assist with ADL evaluation and planning for discharge  - Provide patient education as appropriate  Outcome: Progressing  Goal: Maintains/Returns to pre admission functional level  Description: INTERVENTIONS:  - Perform AM-PAC 6 Click Basic Mobility/ Daily Activity assessment daily.  - Set and communicate daily mobility goal to care team and patient/family/caregiver.   - Collaborate with rehabilitation services on mobility goals if consulted  - Perform Range of Motion 3 times a day.  - Reposition patient every 2 hours.  - Dangle patient 3 times a day  - Stand patient 3 times a day  - Ambulate patient 3 times a day  - Out of bed to chair 3 times a day   - Out of bed for meals 3 times a day  - Out of bed for toileting  - Record patient progress and toleration of activity level   Outcome: Progressing     Problem: DISCHARGE PLANNING  Goal: Discharge to home or other facility with appropriate resources  Description: INTERVENTIONS:  - Identify barriers to discharge w/patient and caregiver  - Arrange for needed discharge resources and transportation as appropriate  - Identify discharge learning needs (meds, wound care, etc.)  - Arrange for interpretive services to assist at discharge as needed  - Refer to Case Management Department for coordinating discharge planning if the patient needs post-hospital services based on physician/advanced practitioner order or complex needs related to  functional status, cognitive ability, or social support system  Outcome: Progressing     Problem: Knowledge Deficit  Goal: Patient/family/caregiver demonstrates understanding of disease process, treatment plan, medications, and discharge instructions  Description: Complete learning assessment and assess knowledge base.  Interventions:  - Provide teaching at level of understanding  - Provide teaching via preferred learning methods  Outcome: Progressing     Problem: CARDIOVASCULAR - ADULT  Goal: Maintains optimal cardiac output and hemodynamic stability  Description: INTERVENTIONS:  - Monitor I/O, vital signs and rhythm  - Monitor for S/S and trends of decreased cardiac output  - Administer and titrate ordered vasoactive medications to optimize hemodynamic stability  - Assess quality of pulses, skin color and temperature  - Assess for signs of decreased coronary artery perfusion  - Instruct patient to report change in severity of symptoms  Outcome: Progressing  Goal: Absence of cardiac dysrhythmias or at baseline rhythm  Description: INTERVENTIONS:  - Continuous cardiac monitoring, vital signs, obtain 12 lead EKG if ordered  - Administer antiarrhythmic and heart rate control medications as ordered  - Monitor electrolytes and administer replacement therapy as ordered  Outcome: Progressing     Problem: RESPIRATORY - ADULT  Goal: Achieves optimal ventilation and oxygenation  Description: INTERVENTIONS:  - Assess for changes in respiratory status  - Assess for changes in mentation and behavior  - Position to facilitate oxygenation and minimize respiratory effort  - Oxygen administered by appropriate delivery if ordered  - Initiate smoking cessation education as indicated  - Encourage broncho-pulmonary hygiene including cough, deep breathe, Incentive Spirometry  - Assess the need for suctioning and aspirate as needed  - Assess and instruct to report SOB or any respiratory difficulty  - Respiratory Therapy support as  indicated  Outcome: Progressing     Problem: GASTROINTESTINAL - ADULT  Goal: Minimal or absence of nausea and/or vomiting  Description: INTERVENTIONS:  - Administer IV fluids if ordered to ensure adequate hydration  - Maintain NPO status until nausea and vomiting are resolved  - Nasogastric tube if ordered  - Administer ordered antiemetic medications as needed  - Provide nonpharmacologic comfort measures as appropriate  - Advance diet as tolerated, if ordered  - Consider nutrition services referral to assist patient with adequate nutrition and appropriate food choices  Outcome: Progressing  Goal: Maintains or returns to baseline bowel function  Description: INTERVENTIONS:  - Assess bowel function  - Encourage oral fluids to ensure adequate hydration  - Administer IV fluids if ordered to ensure adequate hydration  - Administer ordered medications as needed  - Encourage mobilization and activity  - Consider nutritional services referral to assist patient with adequate nutrition and appropriate food choices  Outcome: Progressing  Goal: Maintains adequate nutritional intake  Description: INTERVENTIONS:  - Monitor percentage of each meal consumed  - Identify factors contributing to decreased intake, treat as appropriate  - Assist with meals as needed  - Monitor I&O, weight, and lab values if indicated  - Obtain nutrition services referral as needed  Outcome: Progressing  Goal: Establish and maintain optimal ostomy function  Description: INTERVENTIONS:  - Assess bowel function  - Encourage oral fluids to ensure adequate hydration  - Administer IV fluids if ordered to ensure adequate hydration   - Administer ordered medications as needed  - Encourage mobilization and activity  - Nutrition services referral to assist patient with appropriate food choices  - Assess stoma site  - Consider wound care consult   Outcome: Progressing  Goal: Oral mucous membranes remain intact  Description: INTERVENTIONS  - Assess oral mucosa and  hygiene practices  - Implement preventative oral hygiene regimen  - Implement oral medicated treatments as ordered  - Initiate Nutrition services referral as needed  Outcome: Progressing     Problem: GENITOURINARY - ADULT  Goal: Maintains or returns to baseline urinary function  Description: INTERVENTIONS:  - Assess urinary function  - Encourage oral fluids to ensure adequate hydration if ordered  - Administer IV fluids as ordered to ensure adequate hydration  - Administer ordered medications as needed  - Offer frequent toileting  - Follow urinary retention protocol if ordered  Outcome: Progressing  Goal: Absence of urinary retention  Description: INTERVENTIONS:  - Assess patient’s ability to void and empty bladder  - Monitor I/O  - Bladder scan as needed  - Discuss with physician/AP medications to alleviate retention as needed  - Discuss catheterization for long term situations as appropriate  Outcome: Progressing  Goal: Urinary catheter remains patent  Description: INTERVENTIONS:  - Assess patency of urinary catheter  - If patient has a chronic morales, consider changing catheter if non-functioning  - Follow guidelines for intermittent irrigation of non-functioning urinary catheter  Outcome: Progressing     Problem: METABOLIC, FLUID AND ELECTROLYTES - ADULT  Goal: Electrolytes maintained within normal limits  Description: INTERVENTIONS:  - Monitor labs and assess patient for signs and symptoms of electrolyte imbalances  - Administer electrolyte replacement as ordered  - Monitor response to electrolyte replacements, including repeat lab results as appropriate  - Instruct patient on fluid and nutrition as appropriate  Outcome: Progressing  Goal: Fluid balance maintained  Description: INTERVENTIONS:  - Monitor labs   - Monitor I/O and WT  - Instruct patient on fluid and nutrition as appropriate  - Assess for signs & symptoms of volume excess or deficit  Outcome: Progressing  Goal: Glucose maintained within target  range  Description: INTERVENTIONS:  - Monitor Blood Glucose as ordered  - Assess for signs and symptoms of hyperglycemia and hypoglycemia  - Administer ordered medications to maintain glucose within target range  - Assess nutritional intake and initiate nutrition service referral as needed  Outcome: Progressing     Problem: Nutrition/Hydration-ADULT  Goal: Nutrient/Hydration intake appropriate for improving, restoring or maintaining nutritional needs  Description: Monitor and assess patient's nutrition/hydration status for malnutrition. Collaborate with interdisciplinary team and initiate plan and interventions as ordered.  Monitor patient's weight and dietary intake as ordered or per policy. Utilize nutrition screening tool and intervene as necessary. Determine patient's food preferences and provide high-protein, high-caloric foods as appropriate.     INTERVENTIONS:  - Monitor oral intake, urinary output, labs, and treatment plans  - Assess nutrition and hydration status and recommend course of action  - Evaluate amount of meals eaten  - Assist patient with eating if necessary   - Allow adequate time for meals  - Recommend/ encourage appropriate diets, oral nutritional supplements, and vitamin/mineral supplements  - Order, calculate, and assess calorie counts as needed  - Recommend, monitor, and adjust tube feedings and TPN/PPN based on assessed needs  - Assess need for intravenous fluids  - Provide specific nutrition/hydration education as appropriate  - Include patient/family/caregiver in decisions related to nutrition  Outcome: Progressing

## 2024-09-01 NOTE — PROGRESS NOTES
[facility]  Progress Note  Name: Dee Dee Shaffer I  MRN: [id number]  Unit/Bed#: E4 -01 I Date of Admission: 8/30/2024   Date of Service: 9/1/2024 I Hospital Day: 2    Assessment & Plan   * Acute decompensated heart failure (HCC)  Assessment & Plan  Wt Readings from Last 3 Encounters:   09/01/24 116 kg (256 lb 9.9 oz)   08/19/24 108 kg (237 lb)   08/02/24 109 kg (240 lb)     Patient reports generalized weakness.  Suspect volume overload secondary to increased fluid intake  Continue Lasix  Continue volume removal with hemodialysis            COVID-19  Assessment & Plan  Suspect this is contributing to admitting symptoms, possibly complicated by myocarditis  Continue remdesivir    Hypertension  Assessment & Plan  Continue home BP meds    Chronic obstructive pulmonary disease, unspecified COPD type ([facility])  Assessment & Plan  Not currently in exacerbation    Troponin level elevated  Assessment & Plan  Significantly elevated troponin without chest pain  Suspect type II non-MI related troponin elevation in setting of acute heart failure versus ACS versus myocarditis  Continue heparin drip, aspirin, statin, Imdur  Plan for echo possible cath per cardiology    Coronary artery disease with stable angina pectoris ([facility])  Assessment & Plan  History of coronary artery disease with multiple coronary artery stents  Continue medical management with Plavix, statin, heparin drip, Imdur    Mixed hyperlipidemia  Assessment & Plan  Continue statin therapy    Anemia due to chronic kidney disease, on chronic dialysis ([facility])  Assessment & Plan  Continue to monitor, transfuse as needed    Neurogenic bladder  Assessment & Plan  Chronic [place] catheter    ESRD (end stage renal disease) ([facility])  Assessment & Plan  Lab Results   Component Value Date    EGFR 12 09/01/2024    EGFR 8 08/31/2024    EGFR 9 08/30/2024    CREATININE 3.74 (H) 09/01/2024    CREATININE 5.19 (H) 08/31/2024    CREATININE 4.56 (H) 08/30/2024      Appreciate nephrology recommendations    Type 2 diabetes mellitus with chronic kidney disease on chronic dialysis, with long-term current use of insulin (HCC)  Assessment & Plan  Continue basal bolus regimen plus sliding scale insulin             VTE Pharmacologic Prophylaxis: heparin     Patient Centered Rounds:  Patient care rounds were performed with nursing    Discussions with Specialists or Other Care Team Provider: Independently Reviewed with cardiology team    Education and Discussions with Family / Patient: Updated daughter on the telephone during rounds     Time Spent for Care: I have spent a total time of 51 minutes on 24 in caring for this patient including Diagnostic results, Risks and benefits of tx options, Instructions for management, Patient and family education, Importance of tx compliance, Impressions, Counseling / Coordination of care, Reviewing / ordering tests, medicine, procedures  , Obtaining or reviewing history  , and Communicating with other healthcare professionals .      Current Length of Stay: 2 day(s)    Current Patient Status: Inpatient   Certification Statement: The patient will continue to require additional inpatient hospital stay due to management of acute heart failure, troponin elevation, COVID    Discharge Plan: Suspect greater than 72 hours    Code Status: Level 1 - Full Code      Subjective:   Patient seen and evaluated at bedside.  She feels very fatigued.    Objective:     Vitals:   Temp (24hrs), Av.4 °F (36.3 °C), Min:97 °F (36.1 °C), Max:97.9 °F (36.6 °C)    Temp:  [97 °F (36.1 °C)-97.9 °F (36.6 °C)] 97.1 °F (36.2 °C)  HR:  [68-74] 68  Resp:  [16-20] 18  BP: (124-157)/(55-81) 136/81  SpO2:  [93 %-100 %] 95 %  Body mass index is 40.19 kg/m².     Input and Output Summary (last 24 hours):       Intake/Output Summary (Last 24 hours) at 2024 1631  Last data filed at 2024 1625  Gross per 24 hour   Intake 1740 ml   Output 3300 ml   Net -1560 ml        Physical Exam:     Physical Exam  Vitals reviewed.   Constitutional:       General: She is not in acute distress.     Appearance: She is well-developed. She is ill-appearing. She is not toxic-appearing or diaphoretic.   HENT:      Head: Normocephalic and atraumatic.      Mouth/Throat:      Mouth: Mucous membranes are moist.   Eyes:      General: No scleral icterus.     Extraocular Movements: Extraocular movements intact.   Cardiovascular:      Rate and Rhythm: Normal rate and regular rhythm.      Heart sounds: Normal heart sounds.   Pulmonary:      Effort: Pulmonary effort is normal. No respiratory distress.      Breath sounds: Rales present. No wheezing.      Comments: Decreased breath sounds in the bases  Abdominal:      General: There is no distension.      Palpations: Abdomen is soft.      Tenderness: There is no abdominal tenderness. There is no guarding or rebound.   Musculoskeletal:         General: No swelling, tenderness or deformity.      Right lower leg: Edema present.      Left lower leg: Edema present.   Skin:     General: Skin is warm and dry.   Neurological:      General: No focal deficit present.      Mental Status: She is alert. Mental status is at baseline.   Psychiatric:         Mood and Affect: Mood normal.         Behavior: Behavior normal.         Thought Content: Thought content normal.         Judgment: Judgment normal.         Additional Data:     Labs: I have reviewed pertinent results     Results from last 7 days   Lab Units 09/01/24  0551 08/31/24  0545   WBC Thousand/uL 8.13 7.31   HEMOGLOBIN g/dL 8.2* 8.3*   HEMATOCRIT % 24.6* 24.8*   PLATELETS Thousands/uL 221 206   SEGS PCT %  --  62   LYMPHO PCT %  --  29   MONO PCT %  --  7   EOS PCT %  --  1     Results from last 7 days   Lab Units 09/01/24  0551 08/31/24  0545   SODIUM mmol/L 135 132*   POTASSIUM mmol/L 3.6 3.7   CHLORIDE mmol/L 96 92*   CO2 mmol/L 28 25   BUN mg/dL 34* 55*   CREATININE mg/dL 3.74* 5.19*   ANION GAP mmol/L  11 15*   CALCIUM mg/dL 7.9* 8.0*   ALBUMIN g/dL  --  3.5   TOTAL BILIRUBIN mg/dL  --  0.56   ALK PHOS U/L  --  85   ALT U/L  --  140*   AST U/L  --  223*   GLUCOSE RANDOM mg/dL 144* 170*     Results from last 7 days   Lab Units 08/30/24  2236   INR  1.37*     Results from last 7 days   Lab Units 09/01/24  1623 09/01/24  1117 09/01/24  0739 08/31/24  2110 08/31/24  1603 08/31/24  1106   POC GLUCOSE mg/dl 158* 202* 145* 157* 113 206*     Results from last 7 days   Lab Units 08/31/24  1412   HEMOGLOBIN A1C % 6.5*             Imaging: I have reviewed pertinent imaging       Recent Cultures (last 7 days):     Results from last 7 days   Lab Units 08/30/24  1854   URINE CULTURE  >100,000 cfu/ml Gram Negative Mason*       Last 24 Hours Medication List:   Current Facility-Administered Medications   Medication Dose Route Frequency Provider Last Rate    acetaminophen  650 mg Oral Q4H PRN Ismael Rivas MD      allopurinol  200 mg Oral Daily Ismael Rivas MD      aspirin  81 mg Oral Daily Ismael Rivas MD      atorvastatin  80 mg Oral Daily Alexander Scherer DO      citalopram  40 mg Oral Daily Ismael Rivas MD      clopidogrel  75 mg Oral Daily Ismael Rivas MD      docusate sodium  100 mg Oral BID Ismael Rivas MD      ergocalciferol  50,000 Units Oral Once per day on Monday Wednesday Friday Ismael Rivas MD      [START ON 9/2/2024] furosemide  160 mg Oral Daily Birgit Olguin PA-C      heparin (porcine)  3-20 Units/kg/hr (Order-Specific) Intravenous Titrated Ismael Rivas MD 14.1 Units/kg/hr (09/01/24 1548)    heparin (porcine)  2,000 Units Intravenous Q6H PRN Ismael Rivas MD      heparin (porcine)  4,000 Units Intravenous Q6H PRN Ismael Rivas MD      insulin glargine  20 Units Subcutaneous QAM Ismael Rivas MD      insulin lispro  1-6 Units Subcutaneous 4x Daily (AC & HS) Garcia Lopez DO      isosorbide mononitrate  30 mg Oral QPM Ismael Rivas MD      lactulose  20 g Oral Daily PRN Ismael Rivas MD       levothyroxine  50 mcg Oral Daily Before Breakfast Ismael Rivas MD      losartan  25 mg Oral Daily Ismael Rivas MD      midodrine  5 mg Oral Before Dialysis Ismael Riavs MD      OLANZapine  5 mg Oral HS Ismael Rivas MD      ondansetron  4 mg Intravenous Q6H PRN Ismael Rivas MD      oxyCODONE  10 mg Oral Q4H PRN Ismael Rivas MD      oxyCODONE  5 mg Oral Q4H PRN Ismael Rivas MD      pantoprazole  40 mg Oral Early Morning Ismael Rivas MD      remdesivir  100 mg Intravenous Q24H Garcia Lopez DO      senna-docusate sodium  1 tablet Oral Daily Ismael Rivas MD      sevelamer  1,600 mg Oral TID With Meals Birgit Olguin PA-C          Today, Patient Was Seen By: Garcia Lopez DO    ** Please Note: Dictation voice to text software may have been used in the creation of this document. **

## 2024-09-01 NOTE — ASSESSMENT & PLAN NOTE
Significantly elevated troponin without chest pain  Suspect type II non-MI related troponin elevation in setting of acute heart failure versus ACS versus myocarditis  Continue heparin drip, aspirin, statin, Imdur  Plan for echo possible cath per cardiology

## 2024-09-01 NOTE — ASSESSMENT & PLAN NOTE
History of coronary artery disease with multiple coronary artery stents  Continue medical management with Plavix, statin, heparin drip, Imdur

## 2024-09-01 NOTE — ASSESSMENT & PLAN NOTE
Suspect this is contributing to admitting symptoms, possibly complicated by myocarditis  Continue remdesivir

## 2024-09-02 ENCOUNTER — APPOINTMENT (INPATIENT)
Dept: NON INVASIVE DIAGNOSTICS | Facility: HOSPITAL | Age: 64
DRG: 981 | End: 2024-09-02
Payer: COMMERCIAL

## 2024-09-02 LAB
ANION GAP SERPL CALCULATED.3IONS-SCNC: 11 MMOL/L (ref 4–13)
APTT PPP: 72 SECONDS (ref 23–34)
ATRIAL RATE: 72 BPM
BUN SERPL-MCNC: 42 MG/DL (ref 5–25)
CALCIUM SERPL-MCNC: 8 MG/DL (ref 8.4–10.2)
CHLORIDE SERPL-SCNC: 98 MMOL/L (ref 96–108)
CO2 SERPL-SCNC: 26 MMOL/L (ref 21–32)
CREAT SERPL-MCNC: 4.48 MG/DL (ref 0.6–1.3)
ERYTHROCYTE [DISTWIDTH] IN BLOOD BY AUTOMATED COUNT: 13.7 % (ref 11.6–15.1)
GFR SERPL CREATININE-BSD FRML MDRD: 9 ML/MIN/1.73SQ M
GLUCOSE SERPL-MCNC: 125 MG/DL (ref 65–140)
GLUCOSE SERPL-MCNC: 140 MG/DL (ref 65–140)
GLUCOSE SERPL-MCNC: 220 MG/DL (ref 65–140)
GLUCOSE SERPL-MCNC: 224 MG/DL (ref 65–140)
GLUCOSE SERPL-MCNC: 275 MG/DL (ref 65–140)
HCT VFR BLD AUTO: 25.8 % (ref 34.8–46.1)
HGB BLD-MCNC: 8.3 G/DL (ref 11.5–15.4)
MCH RBC QN AUTO: 33.5 PG (ref 26.8–34.3)
MCHC RBC AUTO-ENTMCNC: 32.2 G/DL (ref 31.4–37.4)
MCV RBC AUTO: 104 FL (ref 82–98)
P AXIS: 46 DEGREES
PLATELET # BLD AUTO: 236 THOUSANDS/UL (ref 149–390)
PMV BLD AUTO: 9.8 FL (ref 8.9–12.7)
POTASSIUM SERPL-SCNC: 3.4 MMOL/L (ref 3.5–5.3)
PR INTERVAL: 156 MS
QRS AXIS: -22 DEGREES
QRSD INTERVAL: 166 MS
QT INTERVAL: 470 MS
QTC INTERVAL: 514 MS
RBC # BLD AUTO: 2.48 MILLION/UL (ref 3.81–5.12)
SODIUM SERPL-SCNC: 135 MMOL/L (ref 135–147)
T WAVE AXIS: 171 DEGREES
VENTRICULAR RATE: 72 BPM
WBC # BLD AUTO: 8.08 THOUSAND/UL (ref 4.31–10.16)

## 2024-09-02 PROCEDURE — 82948 REAGENT STRIP/BLOOD GLUCOSE: CPT

## 2024-09-02 PROCEDURE — 85730 THROMBOPLASTIN TIME PARTIAL: CPT | Performed by: INTERNAL MEDICINE

## 2024-09-02 PROCEDURE — 99232 SBSQ HOSP IP/OBS MODERATE 35: CPT | Performed by: STUDENT IN AN ORGANIZED HEALTH CARE EDUCATION/TRAINING PROGRAM

## 2024-09-02 PROCEDURE — 85027 COMPLETE CBC AUTOMATED: CPT | Performed by: INTERNAL MEDICINE

## 2024-09-02 PROCEDURE — 80048 BASIC METABOLIC PNL TOTAL CA: CPT | Performed by: INTERNAL MEDICINE

## 2024-09-02 PROCEDURE — 93306 TTE W/DOPPLER COMPLETE: CPT | Performed by: INTERNAL MEDICINE

## 2024-09-02 PROCEDURE — 99233 SBSQ HOSP IP/OBS HIGH 50: CPT | Performed by: INTERNAL MEDICINE

## 2024-09-02 PROCEDURE — C8929 TTE W OR WO FOL WCON,DOPPLER: HCPCS

## 2024-09-02 RX ORDER — LOSARTAN POTASSIUM 25 MG/1
25 TABLET ORAL DAILY
Status: DISCONTINUED | OUTPATIENT
Start: 2024-09-04 | End: 2024-09-04 | Stop reason: HOSPADM

## 2024-09-02 RX ADMIN — SEVELAMER HYDROCHLORIDE 1600 MG: 800 TABLET ORAL at 17:10

## 2024-09-02 RX ADMIN — INSULIN LISPRO 4 UNITS: 100 INJECTION, SOLUTION INTRAVENOUS; SUBCUTANEOUS at 17:11

## 2024-09-02 RX ADMIN — ACETAMINOPHEN 325MG 650 MG: 325 TABLET ORAL at 16:01

## 2024-09-02 RX ADMIN — ALLOPURINOL 200 MG: 100 TABLET ORAL at 08:34

## 2024-09-02 RX ADMIN — REMDESIVIR 100 MG: 100 INJECTION, POWDER, LYOPHILIZED, FOR SOLUTION INTRAVENOUS at 17:10

## 2024-09-02 RX ADMIN — SEVELAMER HYDROCHLORIDE 1600 MG: 800 TABLET ORAL at 08:34

## 2024-09-02 RX ADMIN — INSULIN LISPRO 2 UNITS: 100 INJECTION, SOLUTION INTRAVENOUS; SUBCUTANEOUS at 12:53

## 2024-09-02 RX ADMIN — INSULIN LISPRO 2 UNITS: 100 INJECTION, SOLUTION INTRAVENOUS; SUBCUTANEOUS at 22:28

## 2024-09-02 RX ADMIN — FUROSEMIDE 160 MG: 80 TABLET ORAL at 08:34

## 2024-09-02 RX ADMIN — CLOPIDOGREL BISULFATE 75 MG: 75 TABLET ORAL at 08:34

## 2024-09-02 RX ADMIN — PERFLUTREN 0.4 ML/MIN: 6.52 INJECTION, SUSPENSION INTRAVENOUS at 11:39

## 2024-09-02 RX ADMIN — OLANZAPINE 5 MG: 5 TABLET, FILM COATED ORAL at 22:28

## 2024-09-02 RX ADMIN — PANTOPRAZOLE SODIUM 40 MG: 40 TABLET, DELAYED RELEASE ORAL at 05:49

## 2024-09-02 RX ADMIN — LOSARTAN POTASSIUM 25 MG: 25 TABLET, FILM COATED ORAL at 08:34

## 2024-09-02 RX ADMIN — INSULIN GLARGINE 20 UNITS: 100 INJECTION, SOLUTION SUBCUTANEOUS at 08:34

## 2024-09-02 RX ADMIN — OXYCODONE HYDROCHLORIDE 10 MG: 10 TABLET ORAL at 16:00

## 2024-09-02 RX ADMIN — ASPIRIN 81 MG: 81 TABLET, COATED ORAL at 08:34

## 2024-09-02 RX ADMIN — LEVOTHYROXINE SODIUM 50 MCG: 50 TABLET ORAL at 05:49

## 2024-09-02 RX ADMIN — SENNOSIDES AND DOCUSATE SODIUM 1 TABLET: 50; 8.6 TABLET ORAL at 08:34

## 2024-09-02 RX ADMIN — CITALOPRAM HYDROBROMIDE 40 MG: 20 TABLET ORAL at 08:34

## 2024-09-02 RX ADMIN — ERGOCALCIFEROL 50000 UNITS: 1.25 CAPSULE ORAL at 12:52

## 2024-09-02 RX ADMIN — DOCUSATE SODIUM 100 MG: 100 CAPSULE, LIQUID FILLED ORAL at 08:34

## 2024-09-02 RX ADMIN — HEPARIN SODIUM 14.1 UNITS/KG/HR: 10000 INJECTION, SOLUTION INTRAVENOUS at 17:18

## 2024-09-02 RX ADMIN — DOCUSATE SODIUM 100 MG: 100 CAPSULE, LIQUID FILLED ORAL at 17:10

## 2024-09-02 RX ADMIN — SEVELAMER HYDROCHLORIDE 1600 MG: 800 TABLET ORAL at 12:52

## 2024-09-02 RX ADMIN — ISOSORBIDE MONONITRATE 30 MG: 30 TABLET, EXTENDED RELEASE ORAL at 17:10

## 2024-09-02 RX ADMIN — ATORVASTATIN CALCIUM 80 MG: 80 TABLET, FILM COATED ORAL at 08:34

## 2024-09-02 NOTE — PLAN OF CARE
Problem: Potential for Falls  Goal: Patient will remain free of falls  Description: INTERVENTIONS:  - Educate patient/family on patient safety including physical limitations  - Instruct patient to call for assistance with activity   - Consult OT/PT to assist with strengthening/mobility   - Keep Call bell within reach  - Keep bed low and locked with side rails adjusted as appropriate  - Keep care items and personal belongings within reach  - Initiate and maintain comfort rounds  - Make Fall Risk Sign visible to staff  - Offer Toileting every  Hours, in advance of need  - Initiate/Maintain alarm  - Obtain necessary fall risk management equipment:   - Apply yellow socks and bracelet for high fall risk patients  - Consider moving patient to room near nurses station  Outcome: Progressing     Problem: Prexisting or High Potential for Compromised Skin Integrity  Goal: Skin integrity is maintained or improved  Description: INTERVENTIONS:  - Identify patients at risk for skin breakdown  - Assess and monitor skin integrity  - Assess and monitor nutrition and hydration status  - Monitor labs   - Assess for incontinence   - Turn and reposition patient  - Assist with mobility/ambulation  - Relieve pressure over bony prominences  - Avoid friction and shearing  - Provide appropriate hygiene as needed including keeping skin clean and dry  - Evaluate need for skin moisturizer/barrier cream  - Collaborate with interdisciplinary team   - Patient/family teaching  - Consider wound care consult   Outcome: Progressing     Problem: PAIN - ADULT  Goal: Verbalizes/displays adequate comfort level or baseline comfort level  Description: Interventions:  - Encourage patient to monitor pain and request assistance  - Assess pain using appropriate pain scale  - Administer analgesics based on type and severity of pain and evaluate response  - Implement non-pharmacological measures as appropriate and evaluate response  - Consider cultural and  social influences on pain and pain management  - Notify physician/advanced practitioner if interventions unsuccessful or patient reports new pain  Outcome: Progressing     Problem: INFECTION - ADULT  Goal: Absence or prevention of progression during hospitalization  Description: INTERVENTIONS:  - Assess and monitor for signs and symptoms of infection  - Monitor lab/diagnostic results  - Monitor all insertion sites, i.e. indwelling lines, tubes, and drains  - Monitor endotracheal if appropriate and nasal secretions for changes in amount and color  - Schwertner appropriate cooling/warming therapies per order  - Administer medications as ordered  - Instruct and encourage patient and family to use good hand hygiene technique  - Identify and instruct in appropriate isolation precautions for identified infection/condition  Outcome: Progressing  Goal: Absence of fever/infection during neutropenic period  Description: INTERVENTIONS:  - Monitor WBC    Outcome: Progressing     Problem: SAFETY ADULT  Goal: Patient will remain free of falls  Description: INTERVENTIONS:  - Educate patient/family on patient safety including physical limitations  - Instruct patient to call for assistance with activity   - Consult OT/PT to assist with strengthening/mobility   - Keep Call bell within reach  - Keep bed low and locked with side rails adjusted as appropriate  - Keep care items and personal belongings within reach  - Initiate and maintain comfort rounds  - Make Fall Risk Sign visible to staff  - Offer Toileting every  Hours, in advance of need  - Initiate/Maintain alarm  - Obtain necessary fall risk management equipment:   - Apply yellow socks and bracelet for high fall risk patients  - Consider moving patient to room near nurses station  Outcome: Progressing  Goal: Maintain or return to baseline ADL function  Description: INTERVENTIONS:  -  Assess patient's ability to carry out ADLs; assess patient's baseline for ADL function and  identify physical deficits which impact ability to perform ADLs (bathing, care of mouth/teeth, toileting, grooming, dressing, etc.)  - Assess/evaluate cause of self-care deficits   - Assess range of motion  - Assess patient's mobility; develop plan if impaired  - Assess patient's need for assistive devices and provide as appropriate  - Encourage maximum independence but intervene and supervise when necessary  - Involve family in performance of ADLs  - Assess for home care needs following discharge   - Consider OT consult to assist with ADL evaluation and planning for discharge  - Provide patient education as appropriate  Outcome: Progressing  Goal: Maintains/Returns to pre admission functional level  Description: INTERVENTIONS:  - Perform AM-PAC 6 Click Basic Mobility/ Daily Activity assessment daily.  - Set and communicate daily mobility goal to care team and patient/family/caregiver.   - Collaborate with rehabilitation services on mobility goals if consulted  - Perform Range of Motion  times a day.  - Reposition patient every  hours.  - Dangle patient  times a day  - Stand patient  times a day  - Ambulate patient  times a day  - Out of bed to chair  times a day   - Out of bed for meals  times a day  - Out of bed for toileting  - Record patient progress and toleration of activity level   Outcome: Progressing     Problem: DISCHARGE PLANNING  Goal: Discharge to home or other facility with appropriate resources  Description: INTERVENTIONS:  - Identify barriers to discharge w/patient and caregiver  - Arrange for needed discharge resources and transportation as appropriate  - Identify discharge learning needs (meds, wound care, etc.)  - Arrange for interpretive services to assist at discharge as needed  - Refer to Case Management Department for coordinating discharge planning if the patient needs post-hospital services based on physician/advanced practitioner order or complex needs related to functional status,  cognitive ability, or social support system  Outcome: Progressing     Problem: Knowledge Deficit  Goal: Patient/family/caregiver demonstrates understanding of disease process, treatment plan, medications, and discharge instructions  Description: Complete learning assessment and assess knowledge base.  Interventions:  - Provide teaching at level of understanding  - Provide teaching via preferred learning methods  Outcome: Progressing     Problem: CARDIOVASCULAR - ADULT  Goal: Maintains optimal cardiac output and hemodynamic stability  Description: INTERVENTIONS:  - Monitor I/O, vital signs and rhythm  - Monitor for S/S and trends of decreased cardiac output  - Administer and titrate ordered vasoactive medications to optimize hemodynamic stability  - Assess quality of pulses, skin color and temperature  - Assess for signs of decreased coronary artery perfusion  - Instruct patient to report change in severity of symptoms  Outcome: Progressing  Goal: Absence of cardiac dysrhythmias or at baseline rhythm  Description: INTERVENTIONS:  - Continuous cardiac monitoring, vital signs, obtain 12 lead EKG if ordered  - Administer antiarrhythmic and heart rate control medications as ordered  - Monitor electrolytes and administer replacement therapy as ordered  Outcome: Progressing     Problem: RESPIRATORY - ADULT  Goal: Achieves optimal ventilation and oxygenation  Description: INTERVENTIONS:  - Assess for changes in respiratory status  - Assess for changes in mentation and behavior  - Position to facilitate oxygenation and minimize respiratory effort  - Oxygen administered by appropriate delivery if ordered  - Initiate smoking cessation education as indicated  - Encourage broncho-pulmonary hygiene including cough, deep breathe, Incentive Spirometry  - Assess the need for suctioning and aspirate as needed  - Assess and instruct to report SOB or any respiratory difficulty  - Respiratory Therapy support as indicated  Outcome:  Progressing     Problem: GASTROINTESTINAL - ADULT  Goal: Minimal or absence of nausea and/or vomiting  Description: INTERVENTIONS:  - Administer IV fluids if ordered to ensure adequate hydration  - Maintain NPO status until nausea and vomiting are resolved  - Nasogastric tube if ordered  - Administer ordered antiemetic medications as needed  - Provide nonpharmacologic comfort measures as appropriate  - Advance diet as tolerated, if ordered  - Consider nutrition services referral to assist patient with adequate nutrition and appropriate food choices  Outcome: Progressing  Goal: Maintains or returns to baseline bowel function  Description: INTERVENTIONS:  - Assess bowel function  - Encourage oral fluids to ensure adequate hydration  - Administer IV fluids if ordered to ensure adequate hydration  - Administer ordered medications as needed  - Encourage mobilization and activity  - Consider nutritional services referral to assist patient with adequate nutrition and appropriate food choices  Outcome: Progressing  Goal: Maintains adequate nutritional intake  Description: INTERVENTIONS:  - Monitor percentage of each meal consumed  - Identify factors contributing to decreased intake, treat as appropriate  - Assist with meals as needed  - Monitor I&O, weight, and lab values if indicated  - Obtain nutrition services referral as needed  Outcome: Progressing  Goal: Establish and maintain optimal ostomy function  Description: INTERVENTIONS:  - Assess bowel function  - Encourage oral fluids to ensure adequate hydration  - Administer IV fluids if ordered to ensure adequate hydration   - Administer ordered medications as needed  - Encourage mobilization and activity  - Nutrition services referral to assist patient with appropriate food choices  - Assess stoma site  - Consider wound care consult   Outcome: Progressing  Goal: Oral mucous membranes remain intact  Description: INTERVENTIONS  - Assess oral mucosa and hygiene practices  -  Implement preventative oral hygiene regimen  - Implement oral medicated treatments as ordered  - Initiate Nutrition services referral as needed  Outcome: Progressing     Problem: GENITOURINARY - ADULT  Goal: Maintains or returns to baseline urinary function  Description: INTERVENTIONS:  - Assess urinary function  - Encourage oral fluids to ensure adequate hydration if ordered  - Administer IV fluids as ordered to ensure adequate hydration  - Administer ordered medications as needed  - Offer frequent toileting  - Follow urinary retention protocol if ordered  Outcome: Progressing  Goal: Absence of urinary retention  Description: INTERVENTIONS:  - Assess patient’s ability to void and empty bladder  - Monitor I/O  - Bladder scan as needed  - Discuss with physician/AP medications to alleviate retention as needed  - Discuss catheterization for long term situations as appropriate  Outcome: Progressing  Goal: Urinary catheter remains patent  Description: INTERVENTIONS:  - Assess patency of urinary catheter  - If patient has a chronic morales, consider changing catheter if non-functioning  - Follow guidelines for intermittent irrigation of non-functioning urinary catheter  Outcome: Progressing     Problem: METABOLIC, FLUID AND ELECTROLYTES - ADULT  Goal: Electrolytes maintained within normal limits  Description: INTERVENTIONS:  - Monitor labs and assess patient for signs and symptoms of electrolyte imbalances  - Administer electrolyte replacement as ordered  - Monitor response to electrolyte replacements, including repeat lab results as appropriate  - Instruct patient on fluid and nutrition as appropriate  Outcome: Progressing  Goal: Fluid balance maintained  Description: INTERVENTIONS:  - Monitor labs   - Monitor I/O and WT  - Instruct patient on fluid and nutrition as appropriate  - Assess for signs & symptoms of volume excess or deficit  Outcome: Progressing  Goal: Glucose maintained within target range  Description:  INTERVENTIONS:  - Monitor Blood Glucose as ordered  - Assess for signs and symptoms of hyperglycemia and hypoglycemia  - Administer ordered medications to maintain glucose within target range  - Assess nutritional intake and initiate nutrition service referral as needed  Outcome: Progressing     Problem: Nutrition/Hydration-ADULT  Goal: Nutrient/Hydration intake appropriate for improving, restoring or maintaining nutritional needs  Description: Monitor and assess patient's nutrition/hydration status for malnutrition. Collaborate with interdisciplinary team and initiate plan and interventions as ordered.  Monitor patient's weight and dietary intake as ordered or per policy. Utilize nutrition screening tool and intervene as necessary. Determine patient's food preferences and provide high-protein, high-caloric foods as appropriate.     INTERVENTIONS:  - Monitor oral intake, urinary output, labs, and treatment plans  - Assess nutrition and hydration status and recommend course of action  - Evaluate amount of meals eaten  - Assist patient with eating if necessary   - Allow adequate time for meals  - Recommend/ encourage appropriate diets, oral nutritional supplements, and vitamin/mineral supplements  - Order, calculate, and assess calorie counts as needed  - Recommend, monitor, and adjust tube feedings and TPN/PPN based on assessed needs  - Assess need for intravenous fluids  - Provide specific nutrition/hydration education as appropriate  - Include patient/family/caregiver in decisions related to nutrition  Outcome: Progressing

## 2024-09-02 NOTE — ASSESSMENT & PLAN NOTE
History of coronary artery disease with multiple coronary artery stents  Continue medical management with Plavix, statin, heparin drip, Imdur  Tentatively planning for cardiac cath  2D echo pending

## 2024-09-02 NOTE — PROGRESS NOTES
[facility]  Progress Note  Name: Dee Dee Shaffer I  MRN: [id number]  Unit/Bed#: E4 -01 I Date of Admission: 8/30/2024   Date of Service: 9/2/2024 I Hospital Day: 3    Assessment & Plan   COVID-19  Assessment & Plan  Suspect this is contributing to admitting symptoms, possibly complicated by myocarditis  Continue remdesivir    Hypertension  Assessment & Plan  Continue home BP meds    Chronic obstructive pulmonary disease, unspecified COPD type ([facility])  Assessment & Plan  Not currently in exacerbation    Troponin level elevated  Assessment & Plan  Significantly elevated troponin without chest pain  Suspect type II non-MI related troponin elevation in setting of acute heart failure versus ACS versus myocarditis  Continue heparin drip, aspirin, statin, Imdur  2D echo pending  Cardiology evaluated, recommending cardiac cath    Coronary artery disease with stable angina pectoris ([facility])  Assessment & Plan  History of coronary artery disease with multiple coronary artery stents  Continue medical management with Plavix, statin, heparin drip, Imdur  Tentatively planning for cardiac cath  2D echo pending    Mixed hyperlipidemia  Assessment & Plan  Continue statin therapy    Anemia due to chronic kidney disease, on chronic dialysis ([facility])  Assessment & Plan  Continue to monitor, transfuse as needed    Neurogenic bladder  Assessment & Plan  Chronic Cam[place] catheter    ESRD (end stage renal disease) ([facility])  Assessment & Plan  Lab Results   Component Value Date    EGFR 9 09/02/2024    EGFR 12 09/01/2024    EGFR 8 08/31/2024    CREATININE 4.48 (H) 09/02/2024    CREATININE 3.74 (H) 09/01/2024    CREATININE 5.19 (H) 08/31/2024     Appreciate nephrology recommendations    Type 2 diabetes mellitus with chronic kidney disease on chronic dialysis, with long-term current use of insulin ([facility])  Assessment & Plan  Continue basal bolus regimen plus sliding scale insulin      * Acute decompensated heart failure  ([facility])  Assessment & Plan  Wt Readings from Last 3 Encounters:   24 116 kg (256 lb)   24 108 kg (237 lb)   24 109 kg (240 lb)     Patient reports generalized weakness.  Suspect volume overload secondary to increased fluid intake  Continue Lasix 160mg po  Continue volume removal with hemodialysis  Patient symptoms appear to improve, currently on room air.  Denies any shortness of breath             VTE Pharmacologic Prophylaxis:   Pharmacologic: Heparin Drip  Mechanical VTE Prophylaxis in Place: Yes    Current Length of Stay: 3 day(s)    Current Patient Status: Inpatient   Certification Statement: The patient will continue to require additional inpatient hospital stay due to cardiac cath    Discharge Plan: pending    Code Status: Level 1 - Full Code      Subjective:   No events overnight.  Reports doing well.  Denies any chest pain or shortness of breath.  Has been tolerating diet.      Objective:     Vitals:   Temp (24hrs), Av.8 °F (36.6 °C), Min:97.1 °F (36.2 °C), Max:98.5 °F (36.9 °C)    Temp:  [97.1 °F (36.2 °C)-98.5 °F (36.9 °C)] 97.3 °F (36.3 °C)  HR:  [] 75  Resp:  [16-18] 18  BP: (124-146)/(61-97) 146/75  SpO2:  [95 %-98 %] 98 %  Body mass index is 40.1 kg/m².     Input and Output Summary (last 24 hours):       Intake/Output Summary (Last 24 hours) at 2024 1352  Last data filed at 2024  Gross per 24 hour   Intake 630 ml   Output --   Net 630 ml       Physical Exam:     Physical Exam  Vitals and nursing note reviewed.   Constitutional:       Appearance: Normal appearance. She is obese.   HENT:      Head: Normocephalic.   Eyes:      Conjunctiva/sclera: Conjunctivae normal.   Cardiovascular:      Rate and Rhythm: Normal rate.   Pulmonary:      Effort: Pulmonary effort is normal. No respiratory distress.      Breath sounds: No wheezing.   Abdominal:      General: Bowel sounds are normal. There is no distension.      Palpations: Abdomen is soft.   Musculoskeletal:          General: No swelling.      Right lower leg: No edema.      Left lower leg: No edema.   Skin:     General: Skin is warm and dry.   Neurological:      General: No focal deficit present.      Mental Status: She is alert. Mental status is at baseline.         Additional Data:     Labs:    Results from last 7 days   Lab Units 09/02/24  0434 09/01/24  0551 08/31/24  0545   WBC Thousand/uL 8.08   < > 7.31   HEMOGLOBIN g/dL 8.3*   < > 8.3*   HEMATOCRIT % 25.8*   < > 24.8*   PLATELETS Thousands/uL 236   < > 206   SEGS PCT %  --   --  62   LYMPHO PCT %  --   --  29   MONO PCT %  --   --  7   EOS PCT %  --   --  1    < > = values in this interval not displayed.     Results from last 7 days   Lab Units 09/02/24  0434 09/01/24  0551 08/31/24  0545   SODIUM mmol/L 135   < > 132*   POTASSIUM mmol/L 3.4*   < > 3.7   CHLORIDE mmol/L 98   < > 92*   CO2 mmol/L 26   < > 25   BUN mg/dL 42*   < > 55*   CREATININE mg/dL 4.48*   < > 5.19*   ANION GAP mmol/L 11   < > 15*   CALCIUM mg/dL 8.0*   < > 8.0*   ALBUMIN g/dL  --   --  3.5   TOTAL BILIRUBIN mg/dL  --   --  0.56   ALK PHOS U/L  --   --  85   ALT U/L  --   --  140*   AST U/L  --   --  223*   GLUCOSE RANDOM mg/dL 125   < > 170*    < > = values in this interval not displayed.     Results from last 7 days   Lab Units 08/30/24  2236   INR  1.37*     Results from last 7 days   Lab Units 09/02/24  1141 09/02/24  0835 09/01/24  2044 09/01/24  1623 09/01/24  1117 09/01/24  0739 08/31/24  2110 08/31/24  1603 08/31/24  1106   POC GLUCOSE mg/dl 220* 140 163* 158* 202* 145* 157* 113 206*     Results from last 7 days   Lab Units 08/31/24  1412   HEMOGLOBIN A1C % 6.5*               * I Have Reviewed All Lab Data Listed Above.  * Additional Pertinent Lab Tests Reviewed: All Labs For Current Hospital Admission Reviewed    Mobility:  Basic Mobility Inpatient Raw Score: 18  JH-HLM Goal: 6: Walk 10 steps or more  JH-HLM Achieved: 3: Sit at edge of bed    Lines:     Invasive Devices       Peripheral  Intravenous Line  Duration             Peripheral IV 08/30/24 Right Antecubital 2 days              Line  Duration             Hemodialysis Access 02/06/23 Left Upper arm 574 days              Drain  Duration             Suprapubic Catheter 16 Fr. 521 days    Suprapubic Catheter 16 Fr. 31 days                       Imaging:    Imaging Reports Reviewed Today Include:     XR chest 2 views    Result Date: 8/30/2024  Impression: Small bilateral pleural effusions and mild to moderate pulmonary interstitial edema. Workstation performed: [id number]        Recent Cultures (last 7 days):     Results from last 7 days   Lab Units 08/30/24  1854   URINE CULTURE  >100,000 cfu/ml Enterobacter cloacae*  >100,000 cfu/ml Pseudomonas aeruginosa*       Last 24 Hours Medication List:   Current Facility-Administered Medications   Medication Dose Route Frequency Provider Last Rate    acetaminophen  650 mg Oral Q4H PRN Ismael Rivas MD      allopurinol  200 mg Oral Daily Ismael Rivas MD      aspirin  81 mg Oral Daily Ismael Rivas MD      atorvastatin  80 mg Oral Daily Alexander Scherer DO      citalopram  40 mg Oral Daily Ismael Rivas MD      clopidogrel  75 mg Oral Daily Ismael Rivas MD      docusate sodium  100 mg Oral BID Ismael Rivas MD      ergocalciferol  50,000 Units Oral Once per day on Monday Wednesday Friday Ismael Rivas MD      furosemide  160 mg Oral Daily Birgit Olguin PA-C      heparin (porcine)  3-20 Units/kg/hr (Order-Specific) Intravenous Titrated Ismael Rivas MD 14.1 Units/kg/hr (09/01/24 2106)    heparin (porcine)  2,000 Units Intravenous Q6H PRN Ismael Rivas MD      heparin (porcine)  4,000 Units Intravenous Q6H PRN Ismael Rivas MD      insulin glargine  20 Units Subcutaneous QAM Ismael Rivas MD      insulin lispro  1-6 Units Subcutaneous 4x Daily (AC & HS) Garcia Lopez DO      isosorbide mononitrate  30 mg Oral QPM Ismael Rivas MD      lactulose  20 g Oral Daily PRN Ismael Rivas MD       levothyroxine  50 mcg Oral Daily Before Breakfast Ismael Rivas MD      losartan  25 mg Oral Daily Ismael Rivas MD      midodrine  5 mg Oral Before Dialysis Ismael Rivas MD      OLANZapine  5 mg Oral HS Ismael Rivas MD      ondansetron  4 mg Intravenous Q6H PRN Ismael Rivas MD      oxyCODONE  10 mg Oral Q4H PRN Ismael Rivas MD      oxyCODONE  5 mg Oral Q4H PRN Ismael Rivas MD      pantoprazole  40 mg Oral Early Morning Ismael Rivas MD      remdesivir  100 mg Intravenous Q24H Garcia Lopez DO Stopped (09/01/24 5019)    senna-docusate sodium  1 tablet Oral Daily Ismael Rivas MD      sevelamer  1,600 mg Oral TID With Meals Birgit Olguin PA-C          Today, Patient Was Seen By: Jacob Monk MD    ** Please Note: Dictation voice to text software may have been used in the creation of this document. **

## 2024-09-02 NOTE — PLAN OF CARE
Problem: Potential for Falls  Goal: Patient will remain free of falls  Description: INTERVENTIONS:  - Educate patient/family on patient safety including physical limitations  - Instruct patient to call for assistance with activity   - Consult OT/PT to assist with strengthening/mobility   - Keep Call bell within reach  - Keep bed low and locked with side rails adjusted as appropriate  - Keep care items and personal belongings within reach  - Initiate and maintain comfort rounds  - Make Fall Risk Sign visible to staff  - Offer Toileting every 2 Hours, in advance of need  - Initiate/Maintain bed alarm  - Obtain necessary fall risk management equipment:   - Apply yellow socks and bracelet for high fall risk patients  - Consider moving patient to room near nurses station  Outcome: Progressing     Problem: Prexisting or High Potential for Compromised Skin Integrity  Goal: Skin integrity is maintained or improved  Description: INTERVENTIONS:  - Identify patients at risk for skin breakdown  - Assess and monitor skin integrity  - Assess and monitor nutrition and hydration status  - Monitor labs   - Assess for incontinence   - Turn and reposition patient  - Assist with mobility/ambulation  - Relieve pressure over bony prominences  - Avoid friction and shearing  - Provide appropriate hygiene as needed including keeping skin clean and dry  - Evaluate need for skin moisturizer/barrier cream  - Collaborate with interdisciplinary team   - Patient/family teaching  - Consider wound care consult   Outcome: Progressing     Problem: PAIN - ADULT  Goal: Verbalizes/displays adequate comfort level or baseline comfort level  Description: Interventions:  - Encourage patient to monitor pain and request assistance  - Assess pain using appropriate pain scale  - Administer analgesics based on type and severity of pain and evaluate response  - Implement non-pharmacological measures as appropriate and evaluate response  - Consider cultural and  social influences on pain and pain management  - Notify physician/advanced practitioner if interventions unsuccessful or patient reports new pain  Outcome: Progressing     Problem: INFECTION - ADULT  Goal: Absence or prevention of progression during hospitalization  Description: INTERVENTIONS:  - Assess and monitor for signs and symptoms of infection  - Monitor lab/diagnostic results  - Monitor all insertion sites, i.e. indwelling lines, tubes, and drains  - Monitor endotracheal if appropriate and nasal secretions for changes in amount and color  - [place] appropriate cooling/warming therapies per order  - Administer medications as ordered  - Instruct and encourage patient and family to use good hand hygiene technique  - Identify and instruct in appropriate isolation precautions for identified infection/condition  Outcome: Progressing  Goal: Absence of fever/infection during neutropenic period  Description: INTERVENTIONS:  - Monitor WBC    Outcome: Progressing     Problem: SAFETY ADULT  Goal: Patient will remain free of falls  Description: INTERVENTIONS:  - Educate patient/family on patient safety including physical limitations  - Instruct patient to call for assistance with activity   - Consult OT/PT to assist with strengthening/mobility   - Keep Call bell within reach  - Keep bed low and locked with side rails adjusted as appropriate  - Keep care items and personal belongings within reach  - Initiate and maintain comfort rounds  - Make Fall Risk Sign visible to staff  - Offer Toileting every 2 Hours, in advance of need  - Initiate/Maintain bed alarm  - Obtain necessary fall risk management equipment:   - Apply yellow socks and bracelet for high fall risk patients  - Consider moving patient to room near nurses station  Outcome: Progressing  Goal: Maintain or return to baseline ADL function  Description: INTERVENTIONS:  -  Assess patient's ability to carry out ADLs; assess patient's baseline for ADL function and  identify physical deficits which impact ability to perform ADLs (bathing, care of mouth/teeth, toileting, grooming, dressing, etc.)  - Assess/evaluate cause of self-care deficits   - Assess range of motion  - Assess patient's mobility; develop plan if impaired  - Assess patient's need for assistive devices and provide as appropriate  - Encourage maximum independence but intervene and supervise when necessary  - Involve family in performance of ADLs  - Assess for home care needs following discharge   - Consider OT consult to assist with ADL evaluation and planning for discharge  - Provide patient education as appropriate  Outcome: Progressing  Goal: Maintains/Returns to pre admission functional level  Description: INTERVENTIONS:  - Perform AM-PAC 6 Click Basic Mobility/ Daily Activity assessment daily.  - Set and communicate daily mobility goal to care team and patient/family/caregiver.   - Collaborate with rehabilitation services on mobility goals if consulted  - Perform Range of Motion 3 times a day.  - Reposition patient every 2 hours.  - Dangle patient 3 times a day  - Stand patient 3 times a day  - Ambulate patient 3 times a day  - Out of bed to chair 3 times a day   - Out of bed for meals 3 times a day  - Out of bed for toileting  - Record patient progress and toleration of activity level   Outcome: Progressing     Problem: DISCHARGE PLANNING  Goal: Discharge to home or other facility with appropriate resources  Description: INTERVENTIONS:  - Identify barriers to discharge w/patient and caregiver  - Arrange for needed discharge resources and transportation as appropriate  - Identify discharge learning needs (meds, wound care, etc.)  - Arrange for interpretive services to assist at discharge as needed  - Refer to Case Management Department for coordinating discharge planning if the patient needs post-hospital services based on physician/advanced practitioner order or complex needs related to functional status,  cognitive ability, or social support system  Outcome: Progressing     Problem: Knowledge Deficit  Goal: Patient/family/caregiver demonstrates understanding of disease process, treatment plan, medications, and discharge instructions  Description: Complete learning assessment and assess knowledge base.  Interventions:  - Provide teaching at level of understanding  - Provide teaching via preferred learning methods  Outcome: Progressing     Problem: CARDIOVASCULAR - ADULT  Goal: Maintains optimal cardiac output and hemodynamic stability  Description: INTERVENTIONS:  - Monitor I/O, vital signs and rhythm  - Monitor for S/S and trends of decreased cardiac output  - Administer and titrate ordered vasoactive medications to optimize hemodynamic stability  - Assess quality of pulses, skin color and temperature  - Assess for signs of decreased coronary artery perfusion  - Instruct patient to report change in severity of symptoms  Outcome: Progressing  Goal: Absence of cardiac dysrhythmias or at baseline rhythm  Description: INTERVENTIONS:  - Continuous cardiac monitoring, vital signs, obtain 12 lead EKG if ordered  - Administer antiarrhythmic and heart rate control medications as ordered  - Monitor electrolytes and administer replacement therapy as ordered  Outcome: Progressing     Problem: RESPIRATORY - ADULT  Goal: Achieves optimal ventilation and oxygenation  Description: INTERVENTIONS:  - Assess for changes in respiratory status  - Assess for changes in mentation and behavior  - Position to facilitate oxygenation and minimize respiratory effort  - Oxygen administered by appropriate delivery if ordered  - Initiate smoking cessation education as indicated  - Encourage broncho-pulmonary hygiene including cough, deep breathe, Incentive Spirometry  - Assess the need for suctioning and aspirate as needed  - Assess and instruct to report SOB or any respiratory difficulty  - Respiratory Therapy support as indicated  Outcome:  Progressing     Problem: GASTROINTESTINAL - ADULT  Goal: Minimal or absence of nausea and/or vomiting  Description: INTERVENTIONS:  - Administer IV fluids if ordered to ensure adequate hydration  - Maintain NPO status until nausea and vomiting are resolved  - Nasogastric tube if ordered  - Administer ordered antiemetic medications as needed  - Provide nonpharmacologic comfort measures as appropriate  - Advance diet as tolerated, if ordered  - Consider nutrition services referral to assist patient with adequate nutrition and appropriate food choices  Outcome: Progressing  Goal: Maintains or returns to baseline bowel function  Description: INTERVENTIONS:  - Assess bowel function  - Encourage oral fluids to ensure adequate hydration  - Administer IV fluids if ordered to ensure adequate hydration  - Administer ordered medications as needed  - Encourage mobilization and activity  - Consider nutritional services referral to assist patient with adequate nutrition and appropriate food choices  Outcome: Progressing  Goal: Maintains adequate nutritional intake  Description: INTERVENTIONS:  - Monitor percentage of each meal consumed  - Identify factors contributing to decreased intake, treat as appropriate  - Assist with meals as needed  - Monitor I&O, weight, and lab values if indicated  - Obtain nutrition services referral as needed  Outcome: Progressing  Goal: Establish and maintain optimal ostomy function  Description: INTERVENTIONS:  - Assess bowel function  - Encourage oral fluids to ensure adequate hydration  - Administer IV fluids if ordered to ensure adequate hydration   - Administer ordered medications as needed  - Encourage mobilization and activity  - Nutrition services referral to assist patient with appropriate food choices  - Assess stoma site  - Consider wound care consult   Outcome: Progressing  Goal: Oral mucous membranes remain intact  Description: INTERVENTIONS  - Assess oral mucosa and hygiene practices  -  Implement preventative oral hygiene regimen  - Implement oral medicated treatments as ordered  - Initiate Nutrition services referral as needed  Outcome: Progressing     Problem: GENITOURINARY - ADULT  Goal: Maintains or returns to baseline urinary function  Description: INTERVENTIONS:  - Assess urinary function  - Encourage oral fluids to ensure adequate hydration if ordered  - Administer IV fluids as ordered to ensure adequate hydration  - Administer ordered medications as needed  - Offer frequent toileting  - Follow urinary retention protocol if ordered  Outcome: Progressing  Goal: Absence of urinary retention  Description: INTERVENTIONS:  - Assess patient’s ability to void and empty bladder  - Monitor I/O  - Bladder scan as needed  - Discuss with physician/AP medications to alleviate retention as needed  - Discuss catheterization for long term situations as appropriate  Outcome: Progressing  Goal: Urinary catheter remains patent  Description: INTERVENTIONS:  - Assess patency of urinary catheter  - If patient has a chronic morales, consider changing catheter if non-functioning  - Follow guidelines for intermittent irrigation of non-functioning urinary catheter  Outcome: Progressing     Problem: METABOLIC, FLUID AND ELECTROLYTES - ADULT  Goal: Electrolytes maintained within normal limits  Description: INTERVENTIONS:  - Monitor labs and assess patient for signs and symptoms of electrolyte imbalances  - Administer electrolyte replacement as ordered  - Monitor response to electrolyte replacements, including repeat lab results as appropriate  - Instruct patient on fluid and nutrition as appropriate  Outcome: Progressing  Goal: Fluid balance maintained  Description: INTERVENTIONS:  - Monitor labs   - Monitor I/O and WT  - Instruct patient on fluid and nutrition as appropriate  - Assess for signs & symptoms of volume excess or deficit  Outcome: Progressing  Goal: Glucose maintained within target range  Description:  INTERVENTIONS:  - Monitor Blood Glucose as ordered  - Assess for signs and symptoms of hyperglycemia and hypoglycemia  - Administer ordered medications to maintain glucose within target range  - Assess nutritional intake and initiate nutrition service referral as needed  Outcome: Progressing     Problem: Nutrition/Hydration-ADULT  Goal: Nutrient/Hydration intake appropriate for improving, restoring or maintaining nutritional needs  Description: Monitor and assess patient's nutrition/hydration status for malnutrition. Collaborate with interdisciplinary team and initiate plan and interventions as ordered.  Monitor patient's weight and dietary intake as ordered or per policy. Utilize nutrition screening tool and intervene as necessary. Determine patient's food preferences and provide high-protein, high-caloric foods as appropriate.     INTERVENTIONS:  - Monitor oral intake, urinary output, labs, and treatment plans  - Assess nutrition and hydration status and recommend course of action  - Evaluate amount of meals eaten  - Assist patient with eating if necessary   - Allow adequate time for meals  - Recommend/ encourage appropriate diets, oral nutritional supplements, and vitamin/mineral supplements  - Order, calculate, and assess calorie counts as needed  - Recommend, monitor, and adjust tube feedings and TPN/PPN based on assessed needs  - Assess need for intravenous fluids  - Provide specific nutrition/hydration education as appropriate  - Include patient/family/caregiver in decisions related to nutrition  Outcome: Progressing

## 2024-09-02 NOTE — ASSESSMENT & PLAN NOTE
Wt Readings from Last 3 Encounters:   09/02/24 116 kg (256 lb)   08/19/24 108 kg (237 lb)   08/02/24 109 kg (240 lb)     Patient reports generalized weakness.  Suspect volume overload secondary to increased fluid intake  Continue Lasix 160mg po  Continue volume removal with hemodialysis  Patient symptoms appear to improve, currently on room air.  Denies any shortness of breath

## 2024-09-02 NOTE — PROGRESS NOTES
Cardiology Progress Note   MD [facility], [facility]  Alexander Scherer DO, [facility]  MD Arnaud Jansen DO, [facility]  Irwin England DO [facility]  ----------------------------------------------------------------  [address] [address]    Dee Dee Shaffer 63 y.o. female MRN: [id number]  Unit/Bed#: E4 -01 Encounter: [id number]      ASSESSMENT:   Volume overload  Chronic HFrEF  LVEF 38%, moderate LVH, probable diastolic dysfunction, mild LA dilatation, AV sclerosis, trace AR, MV sclerosis, mild MR/TR, small pericardial effusion, June 2022  Elevated troponin with precordial chest pain  COVID-19 infection, August 2024  Ischemic cardiomyopathy  CAD   s/p cath w/ JANET-mLCx, moderate diffuse LAD and 95% small RCA (treated medically), July 2022  s/p PCI x5 to unknown vessels in 2012 and 2017 in [place]  Type 2 diabetes mellitus  Hypertension  Dyslipidemia  ESRD on HD  Neurogenic bladder with suprapubic catheter    PLAN:  Patient is continuing to feel better  Heparin drip, aspirin, high intensity statin, Plavix, losartan and isosorbide  2D echocardiogram pending to assess cardiac structure and function  Unclear if patient's elevated troponin is type I NSTEMI, type II MI related to underlying CAD with COVID infection or myocarditis, but given the severity of the patient's troponin elevation, it is reasonable to send for invasive coronary angiography  N.p.o. after midnight for cardiac catheterization  Risks, benefits and alternatives to cardiac catheterization have been discussed at length including the risk of bleeding, infection, renal failure, CVA, myocardial infarction and death; patient understands these risks and wishes to proceed.  Oral Lasix  Strict I's/O's and daily weights  Keep potassium greater than 4 and magnesium greater than 2  Hemodialysis as per nephrology    Signed: Alexander Scherer DO, LAYLA, MARISOL, MARIE, FACP      History of Present  Illness:  Patient seen and examined. Denies chest pain, pressure, tightness or squeezing.  Denies lightheadedness, dizziness or palpitations.  Denies lower extremity swelling, orthopnea or paroxysmal nocturnal dyspnea. Resting in bed comfortably.       Review of Systems:  Review of Systems   Constitutional: Negative for decreased appetite, fever, weight gain and weight loss.   HENT:  Negative for congestion and sore throat.    Eyes:  Negative for visual disturbance.   Cardiovascular:  Negative for chest pain, dyspnea on exertion, leg swelling, near-syncope and palpitations.   Respiratory:  Negative for cough and shortness of breath.    Hematologic/Lymphatic: Negative for bleeding problem.   Skin:  Negative for rash.   Musculoskeletal:  Negative for myalgias and neck pain.   Gastrointestinal:  Negative for abdominal pain and nausea.   Neurological:  Negative for light-headedness and weakness.   Psychiatric/Behavioral:  Negative for depression.        Allergies   Allergen Reactions    Latex Itching    Codeine GI Intolerance       No current facility-administered medications on file prior to encounter.     Current Outpatient Medications on File Prior to Encounter   Medication Sig    allopurinol (ZYLOPRIM) 100 mg tablet TAKE 2 TABLETS (200 MG) BY MOUTH DAILY    aspirin (Aspirin Low Dose) 81 mg EC tablet TAKE 1 TABLET (81 MG TOTAL) BY MOUTH DAILY    atorvastatin (LIPITOR) 40 mg tablet TAKE 1 TABLET (40 MG TOTAL) BY MOUTH DAILY    Blood Glucose Monitoring Suppl (OneTouch Verio Reflect) w/Device KIT Check blood sugars once daily. Please substitute with appropriate alternative as covered by patient's insurance. Dx: E11.65    calcitriol (ROCALTROL) 0.5 MCG capsule Take 1 capsule (0.5 mcg total) by mouth 3 (three) times a week    citalopram (CeleXA) 40 mg tablet TAKE 1 TABLET BY MOUTH DAILY    clopidogrel (PLAVIX) 75 mg tablet TAKE 1 TABLET (75 MG TOTAL) BY MOUTH DAILY    Continuous Blood Gluc Sensor (Dexcom G7 Sensor) Use 1  Device every 10 days    docusate sodium (COLACE) 100 mg capsule Take 1 capsule (100 mg total) by mouth 2 (two) times a day    ergocalciferol (VITAMIN D2) 50,000 units TAKE 1 CAPSULE (50,000 UNITS TOTAL) BY MOUTH 3 (THREE) TIMES A WEEK    glucose blood (OneTouch Verio) test strip Check blood sugars once daily. Please substitute with appropriate alternative as covered by patient's insurance. Dx: E11.65    insulin degludec (Tresiba FlexTouch) 200 units/mL CONCENTRATED U-200 injection pen Inject 35 units once daily    insulin lispro (HumaLOG) 100 units/mL injection pen INJECT 20 UNITS UNDER SKIN THREE TIMES A DAY BEFORE MEALS    Insulin Pen Needle (BD Pen Needle Micro U/F) 32G X 6 MM MISC Inject under the skin 3 (three) times a day    Insulin Pen Needle (BD Pen Needle Joanne 2nd Gen) 32G X 4 MM MISC INJECT UNDER THE SKIN 4 (FOUR) TIMES A DAY USE AS DIRECTED    isosorbide mononitrate (IMDUR) 30 mg 24 hr tablet TAKE 1 TABLET (30 MG TOTAL) BY MOUTH EVERY EVENING    lactulose (CHRONULAC) 10 g/15 mL solution TAKE 30 ML (20 G TOTAL) BY MOUTH DAILY AS NEEDED (FOR CONSTIPATION)    levothyroxine 50 mcg tablet TAKE 1 TABLET (50 MCG TOTAL) BY MOUTH DAILY    losartan (COZAAR) 25 mg tablet TAKE 1 TABLET (25 MG TOTAL) BY MOUTH DAILY    midodrine (PROAMATINE) 5 mg tablet TAKE 1 TABLET (5 MG TOTAL) BY MOUTH AS NEEDED (IF SBP<100 WITH DIALYSIS)    nystatin (MYCOSTATIN) powder Apply topically 3 (three) times a day    OLANZapine (ZyPREXA) 5 mg tablet TAKE 1 TABLET (5 MG TOTAL) BY MOUTH DAILY AT BEDTIME    ondansetron (ZOFRAN) 4 mg tablet Take 1 tablet (4 mg total) by mouth every 8 (eight) hours as needed for nausea or vomiting    OneTouch Delica Lancets 33G MISC Check blood sugars once daily. Please substitute with appropriate alternative as covered by patient's insurance. Dx: E11.65    oxyCODONE (ROXICODONE) 10 MG TABS Take 1 tablet (10 mg total) by mouth every 8 (eight) hours as needed for severe pain 30 days supply Max Daily Amount: 30  mg    Ozempic, 2 MG/DOSE, 8 MG/3ML injection pen INJECT 0.75 ML (2 MG TOTAL) UNDER THE SKIN EVERY 7 DAYS    senna-docusate sodium (Senexon-S) 8.6-50 mg per tablet TAKE 1 TABLET BY MOUTH DAILY    sevelamer carbonate (RENVELA) 800 mg tablet TAKE 2 TABLETS (1,600 MG TOTAL) BY MOUTH 3 (THREE) TIMES A DAY WITH MEALS    silver sulfadiazine (SILVADENE,SSD) 1 % cream Apply topically daily    furosemide (LASIX) 80 mg tablet TAKE 2 TABLETS (160 MG TOTAL) BY MOUTH DAILY    hydrALAZINE (APRESOLINE) 25 mg tablet TAKE 1 TABLET (25 MG TOTAL) BY MOUTH 3 (THREE) TIMES A DAY HOLD SBP <100    hydrALAZINE (APRESOLINE) 25 mg tablet TAKE 1 TABLET (25 MG TOTAL) BY MOUTH 3 (THREE) TIMES A DAY HOLD SBP <100    ketoconazole (NIZORAL) 2 % cream     naloxone (NARCAN) 4 mg/0.1 mL nasal spray Administer 1 spray into a nostril. If breathing does not return to normal or if breathing difficulty resumes after 2-3 minutes, give another dose in the other nostril using a new spray. (Patient not taking: Reported on 8/30/2024)    nitroglycerin (NITROSTAT) 0.4 mg SL tablet Place 1 tablet (0.4 mg total) under the tongue every 5 (five) minutes as needed for chest pain (Patient not taking: Reported on 8/30/2024)    pantoprazole (PROTONIX) 40 mg tablet TAKE 1 TABLET (40 MG TOTAL) BY MOUTH DAILY    [DISCONTINUED] oxygen gas Inhale 2 L/min continuous. Indications: Respiratory Failure    [DISCONTINUED] Torsemide 40 MG TABS Take 40 mg by mouth daily        Current Facility-Administered Medications   Medication Dose Route Frequency Provider Last Rate    acetaminophen  650 mg Oral Q4H PRN Ismael Rivas MD      allopurinol  200 mg Oral Daily Ismael Rivas MD      aspirin  81 mg Oral Daily Ismael Rivas MD      atorvastatin  80 mg Oral Daily Alexander Scherer DO      citalopram  40 mg Oral Daily Ismeal Rivas MD      clopidogrel  75 mg Oral Daily Ismael Rivas MD      docusate sodium  100 mg Oral BID Ismael Rivas MD      ergocalciferol  50,000 Units Oral Once per  day on Monday Wednesday Friday Ismael Rivas MD      furosemide  160 mg Oral Daily Birgti Olguin PA-C      heparin (porcine)  3-20 Units/kg/hr (Order-Specific) Intravenous Titrated Ismael Rivas MD 14.1 Units/kg/hr (09/01/24 2106)    heparin (porcine)  2,000 Units Intravenous Q6H PRN Ismael Rivas MD      heparin (porcine)  4,000 Units Intravenous Q6H PRN Ismael Rivas MD      insulin glargine  20 Units Subcutaneous QAM Ismael Rivas MD      insulin lispro  1-6 Units Subcutaneous 4x Daily (AC & HS) Garcia Lopez DO      isosorbide mononitrate  30 mg Oral QPM Ismael Rivas MD      lactulose  20 g Oral Daily PRN Ismael Rivas MD      levothyroxine  50 mcg Oral Daily Before Breakfast Ismael Rivas MD      losartan  25 mg Oral Daily Ismael Rivas MD      midodrine  5 mg Oral Before Dialysis Ismael Rivas MD      OLANZapine  5 mg Oral HS Ismael Rivas MD      ondansetron  4 mg Intravenous Q6H PRN Ismael Rivas MD      oxyCODONE  10 mg Oral Q4H PRN Ismael Rivas MD      oxyCODONE  5 mg Oral Q4H PRN Ismael Rivas MD      pantoprazole  40 mg Oral Early Morning Ismael Rivas MD      remdesivir  100 mg Intravenous Q24H Garcia Lopez DO Stopped (09/01/24 1746)    senna-docusate sodium  1 tablet Oral Daily Ismael Rivas MD      sevelamer  1,600 mg Oral TID With Meals Birgit Olguin PA-C         heparin (porcine), 3-20 Units/kg/hr (Order-Specific), Last Rate: 14.1 Units/kg/hr (09/01/24 2106)        Vitals:    09/01/24 2257 09/02/24 0214 09/02/24 0545 09/02/24 0837   BP: 124/63 125/61  145/97   BP Location: Right arm Right arm  Left arm   Pulse: 68 104  96   Resp: 18 16  18   Temp: 98.3 °F (36.8 °C)   98.5 °F (36.9 °C)   TempSrc: Temporal   Temporal   SpO2: 97% 96%  98%   Weight:   116 kg (256 lb 2.8 oz)    Height:         Body mass index is 40.12 kg/m².      Intake/Output Summary (Last 24 hours) at 9/2/2024 0958  Last data filed at 9/1/2024 2030  Gross per 24 hour   Intake 990 ml   Output --   Net 990 ml  "      Weight change: -0.2 kg (-7.1 oz)    PHYSICAL EXAMINATION:  Gen: Awake, Alert, NAD  Head/eyes: AT/NC, pupils equal and round, Anicteric  ENT: mmm  Neck: Supple, No elevated JVP, trachea midline  Resp: CTA bilaterally no w/r/r  CV: RRR +S1, S2, No m/r/g  Abd: Soft, obese, NT/ND + BS  Ext: +1 pitting LE edema bilaterally  Neuro: Follows commands, moves all extermities  Psych: Appropriate affect, pleasant mood, pleasant attitude, non-combative  Skin: warm; no rash, erythema or venous stasis changes on exposed skin    Lab Results:  Results from last 7 days   Lab Units 09/02/24  0434   WBC Thousand/uL 8.08   HEMOGLOBIN g/dL 8.3*   HEMATOCRIT % 25.8*   PLATELETS Thousands/uL 236     Results from last 7 days   Lab Units 09/02/24  0434 09/01/24  0551 08/31/24  0545   POTASSIUM mmol/L 3.4*   < > 3.7   CHLORIDE mmol/L 98   < > 92*   CO2 mmol/L 26   < > 25   BUN mg/dL 42*   < > 55*   CREATININE mg/dL 4.48*   < > 5.19*   CALCIUM mg/dL 8.0*   < > 8.0*   ALK PHOS U/L  --   --  85   ALT U/L  --   --  140*   AST U/L  --   --  223*    < > = values in this interval not displayed.     No results found for: \"TROPONINT\"      Results from last 7 days   Lab Units 08/30/24  1932 08/30/24  1733 08/30/24  1528   HS TNI 0HR ng/L  --   --  18,297*   HS TNI 2HR ng/L  --  16,953*  --    HS TNI 4HR ng/L 17,101*  --   --      Results from last 7 days   Lab Units 08/30/24  2236   INR  1.37*       Tele:     This note was completed in part utilizing M-Modal Fluency Direct Software.  Grammatical errors, random word insertions, spelling mistakes, and incomplete sentences may be an occasional consequence of this system secondary to software limitations, ambient noise, and hardware issues.  If you have any questions or concerns about the content, text, or information contained within the body of this dictation, please contact the provider for clarification.      "

## 2024-09-02 NOTE — PROGRESS NOTES
Cardiology Progress Note   MD Yonathan Xavier MD, [facility]  Alexander Scherer DO, [facility]  MD Arnaud Jansen DO, [facility]  Irwin England DO, [facility]  ----------------------------------------------------------------  [address] [address]    Dee Dee Shaffer 63 y.o. female MRN: [id number]  Unit/Bed#: E4 -01 Encounter: [id number]      ASSESSMENT:   Volume overload  Chronic HFrEF  LVEF 38%, moderate LVH, probable diastolic dysfunction, mild LA dilatation, AV sclerosis, trace AR, MV sclerosis, mild MR/TR, small pericardial effusion, June 2022  Elevated troponin with precordial chest pain  COVID-19 infection, August 2024  Ischemic cardiomyopathy  CAD   s/p cath w/ JANET-mLCx, moderate diffuse LAD and 95% small RCA (treated medically), July 2022  s/p PCI x5 to unknown vessels in 2012 and 2017 in [place]  Type 2 diabetes mellitus  Hypertension  Dyslipidemia  ESRD on HD  Neurogenic bladder with suprapubic catheter    PLAN:  Patient was found to have acute COVID-19 infection, but did experience chest discomfort with some generalized fatigue  Unclear if the patient's troponin elevation is consistent with myocarditis, possible type II or type I MI  Will treat for ACS regardless  Continue heparin drip, aspirin, high intensity statin, Plavix  2D echocardiogram pending to assess cardiac structure and function  Continue losartan and isosorbide  Transition to oral Lasix tomorrow  Strict I's/O's and daily weights  Keep potassium greater than 4 magnesium greater than 2  May consider invasive coronary angiography on Tuesday  Hemodialysis as per nephrology    Signed: Alexander Scherer DO, [facility] MARIE DAMON, FACP      History of Present Illness:  Patient seen and examined. Denies chest pain, pressure, tightness or squeezing.  Denies lightheadedness, dizziness or palpitations.  Denies lower extremity swelling, orthopnea or paroxysmal nocturnal dyspnea.      Review of  Systems:  Review of Systems   Constitutional: Negative for decreased appetite, fever, weight gain and weight loss.   HENT:  Negative for congestion and sore throat.    Eyes:  Negative for visual disturbance.   Cardiovascular:  Negative for chest pain, dyspnea on exertion, leg swelling, near-syncope and palpitations.   Respiratory:  Negative for cough and shortness of breath.    Hematologic/Lymphatic: Negative for bleeding problem.   Skin:  Negative for rash.   Musculoskeletal:  Negative for myalgias and neck pain.   Gastrointestinal:  Negative for abdominal pain and nausea.   Neurological:  Negative for light-headedness and weakness.   Psychiatric/Behavioral:  Negative for depression.        Allergies   Allergen Reactions    Latex Itching    Codeine GI Intolerance       No current facility-administered medications on file prior to encounter.     Current Outpatient Medications on File Prior to Encounter   Medication Sig    allopurinol (ZYLOPRIM) 100 mg tablet TAKE 2 TABLETS (200 MG) BY MOUTH DAILY    aspirin (Aspirin Low Dose) 81 mg EC tablet TAKE 1 TABLET (81 MG TOTAL) BY MOUTH DAILY    atorvastatin (LIPITOR) 40 mg tablet TAKE 1 TABLET (40 MG TOTAL) BY MOUTH DAILY    Blood Glucose Monitoring Suppl (OneTouch Verio Reflect) w/Device KIT Check blood sugars once daily. Please substitute with appropriate alternative as covered by patient's insurance. Dx: E11.65    calcitriol (ROCALTROL) 0.5 MCG capsule Take 1 capsule (0.5 mcg total) by mouth 3 (three) times a week    citalopram (CeleXA) 40 mg tablet TAKE 1 TABLET BY MOUTH DAILY    clopidogrel (PLAVIX) 75 mg tablet TAKE 1 TABLET (75 MG TOTAL) BY MOUTH DAILY    Continuous Blood Gluc Sensor (Dexcom G7 Sensor) Use 1 Device every 10 days    docusate sodium (COLACE) 100 mg capsule Take 1 capsule (100 mg total) by mouth 2 (two) times a day    ergocalciferol (VITAMIN D2) 50,000 units TAKE 1 CAPSULE (50,000 UNITS TOTAL) BY MOUTH 3 (THREE) TIMES A WEEK    glucose blood (OneTouch  Verio) test strip Check blood sugars once daily. Please substitute with appropriate alternative as covered by patient's insurance. Dx: E11.65    insulin degludec (Tresiba FlexTouch) 200 units/mL CONCENTRATED U-200 injection pen Inject 35 units once daily    insulin lispro (HumaLOG) 100 units/mL injection pen INJECT 20 UNITS UNDER SKIN THREE TIMES A DAY BEFORE MEALS    Insulin Pen Needle (BD Pen Needle Micro U/F) 32G X 6 MM MISC Inject under the skin 3 (three) times a day    Insulin Pen Needle (BD Pen Needle Joanne 2nd Gen) 32G X 4 MM MISC INJECT UNDER THE SKIN 4 (FOUR) TIMES A DAY USE AS DIRECTED    isosorbide mononitrate (IMDUR) 30 mg 24 hr tablet TAKE 1 TABLET (30 MG TOTAL) BY MOUTH EVERY EVENING    lactulose (CHRONULAC) 10 g/15 mL solution TAKE 30 ML (20 G TOTAL) BY MOUTH DAILY AS NEEDED (FOR CONSTIPATION)    levothyroxine 50 mcg tablet TAKE 1 TABLET (50 MCG TOTAL) BY MOUTH DAILY    losartan (COZAAR) 25 mg tablet TAKE 1 TABLET (25 MG TOTAL) BY MOUTH DAILY    midodrine (PROAMATINE) 5 mg tablet TAKE 1 TABLET (5 MG TOTAL) BY MOUTH AS NEEDED (IF SBP<100 WITH DIALYSIS)    nystatin (MYCOSTATIN) powder Apply topically 3 (three) times a day    OLANZapine (ZyPREXA) 5 mg tablet TAKE 1 TABLET (5 MG TOTAL) BY MOUTH DAILY AT BEDTIME    ondansetron (ZOFRAN) 4 mg tablet Take 1 tablet (4 mg total) by mouth every 8 (eight) hours as needed for nausea or vomiting    OneTouch Delica Lancets 33G MISC Check blood sugars once daily. Please substitute with appropriate alternative as covered by patient's insurance. Dx: E11.65    oxyCODONE (ROXICODONE) 10 MG TABS Take 1 tablet (10 mg total) by mouth every 8 (eight) hours as needed for severe pain 30 days supply Max Daily Amount: 30 mg    Ozempic, 2 MG/DOSE, 8 MG/3ML injection pen INJECT 0.75 ML (2 MG TOTAL) UNDER THE SKIN EVERY 7 DAYS    senna-docusate sodium (Senexon-S) 8.6-50 mg per tablet TAKE 1 TABLET BY MOUTH DAILY    sevelamer carbonate (RENVELA) 800 mg tablet TAKE 2 TABLETS (1,600 MG  TOTAL) BY MOUTH 3 (THREE) TIMES A DAY WITH MEALS    silver sulfadiazine (SILVADENE,SSD) 1 % cream Apply topically daily    furosemide (LASIX) 80 mg tablet TAKE 2 TABLETS (160 MG TOTAL) BY MOUTH DAILY    hydrALAZINE (APRESOLINE) 25 mg tablet TAKE 1 TABLET (25 MG TOTAL) BY MOUTH 3 (THREE) TIMES A DAY HOLD SBP <100    hydrALAZINE (APRESOLINE) 25 mg tablet TAKE 1 TABLET (25 MG TOTAL) BY MOUTH 3 (THREE) TIMES A DAY HOLD SBP <100    ketoconazole (NIZORAL) 2 % cream     naloxone (NARCAN) 4 mg/0.1 mL nasal spray Administer 1 spray into a nostril. If breathing does not return to normal or if breathing difficulty resumes after 2-3 minutes, give another dose in the other nostril using a new spray. (Patient not taking: Reported on 8/30/2024)    nitroglycerin (NITROSTAT) 0.4 mg SL tablet Place 1 tablet (0.4 mg total) under the tongue every 5 (five) minutes as needed for chest pain (Patient not taking: Reported on 8/30/2024)    pantoprazole (PROTONIX) 40 mg tablet TAKE 1 TABLET (40 MG TOTAL) BY MOUTH DAILY    [DISCONTINUED] oxygen gas Inhale 2 L/min continuous. Indications: Respiratory Failure    [DISCONTINUED] Torsemide 40 MG TABS Take 40 mg by mouth daily        Current Facility-Administered Medications   Medication Dose Route Frequency Provider Last Rate    acetaminophen  650 mg Oral Q4H PRN Ismael Rivas MD      allopurinol  200 mg Oral Daily Ismael Rivas MD      aspirin  81 mg Oral Daily Ismael Rivas MD      atorvastatin  80 mg Oral Daily Alexander Scherer DO      citalopram  40 mg Oral Daily Ismael Rivas MD      clopidogrel  75 mg Oral Daily Ismael Rivas MD      docusate sodium  100 mg Oral BID Ismael Rivas MD      ergocalciferol  50,000 Units Oral Once per day on Monday Wednesday Friday Ismael Rivas MD      [START ON 9/2/2024] furosemide  160 mg Oral Daily Birgit Olguin PA-C      heparin (porcine)  3-20 Units/kg/hr (Order-Specific) Intravenous Titrated Ismael Rivas MD 14.1 Units/kg/hr (09/01/24 2106)     heparin (porcine)  2,000 Units Intravenous Q6H PRN Ismael Rivas MD      heparin (porcine)  4,000 Units Intravenous Q6H PRN Ismael Rivas MD      insulin glargine  20 Units Subcutaneous QAM Ismael Rivas MD      insulin lispro  1-6 Units Subcutaneous 4x Daily (AC & HS) Garcia Lopez DO      isosorbide mononitrate  30 mg Oral QPM Ismael Rivas MD      lactulose  20 g Oral Daily PRN Ismael Rivas MD      levothyroxine  50 mcg Oral Daily Before Breakfast Ismael Rivas MD      losartan  25 mg Oral Daily Ismael Rivas MD      midodrine  5 mg Oral Before Dialysis Ismael Rivas MD      OLANZapine  5 mg Oral HS Ismael Rivas MD      ondansetron  4 mg Intravenous Q6H PRN Ismael Rivas MD      oxyCODONE  10 mg Oral Q4H PRN Ismael Rivas MD      oxyCODONE  5 mg Oral Q4H PRN Ismael Rivas MD      pantoprazole  40 mg Oral Early Morning Ismael Rivas MD      remdesivir  100 mg Intravenous Q24H Garcia Lopez DO Stopped (09/01/24 1746)    senna-docusate sodium  1 tablet Oral Daily Ismael Rivas MD      sevelamer  1,600 mg Oral TID With Meals Birgit Olguin PA-C         heparin (porcine), 3-20 Units/kg/hr (Order-Specific), Last Rate: 14.1 Units/kg/hr (09/01/24 2106)        Vitals:    09/01/24 0742 09/01/24 1121 09/01/24 1513 09/01/24 1907   BP: 139/79 157/64 136/81 125/62   BP Location: Right arm Right arm Right arm Right arm   Pulse: 74 71 68 68   Resp: 16 18 18 18   Temp: (!) 97 °F (36.1 °C) (!) 97 °F (36.1 °C) (!) 97.1 °F (36.2 °C) 97.6 °F (36.4 °C)   TempSrc: Temporal Temporal Temporal Temporal   SpO2: 93% 97% 95% 98%   Weight:       Height:         Body mass index is 40.19 kg/m².      Intake/Output Summary (Last 24 hours) at 9/1/2024 2155  Last data filed at 9/1/2024 1625  Gross per 24 hour   Intake 960 ml   Output --   Net 960 ml       Weight change: -0.2 kg (-7.1 oz)    PHYSICAL EXAMINATION:  Gen: Awake, Alert, NAD  Head/eyes: AT/NC, pupils equal and round, Anicteric  ENT: mmm  Neck: Supple, No elevated JVP,  "trachea midline  Resp: CTA bilaterally no w/r/r  CV: RRR +S1, S2, No m/r/g  Abd: Soft, obese, NT/ND + BS  Ext: no LE edema bilaterally  Neuro: Follows commands, moves all extermities  Psych: Appropriate affect, happy mood, pleasant attitude, non-combative  Skin: warm; no rash, erythema or venous stasis changes on exposed skin    Lab Results:  Results from last 7 days   Lab Units 09/01/24  0551   WBC Thousand/uL 8.13   HEMOGLOBIN g/dL 8.2*   HEMATOCRIT % 24.6*   PLATELETS Thousands/uL 221     Results from last 7 days   Lab Units 09/01/24  0551 08/31/24  0545   POTASSIUM mmol/L 3.6 3.7   CHLORIDE mmol/L 96 92*   CO2 mmol/L 28 25   BUN mg/dL 34* 55*   CREATININE mg/dL 3.74* 5.19*   CALCIUM mg/dL 7.9* 8.0*   ALK PHOS U/L  --  85   ALT U/L  --  140*   AST U/L  --  223*     No results found for: \"TROPONINT\"      Results from last 7 days   Lab Units 08/30/24  1932 08/30/24  1733 08/30/24  1528   HS TNI 0HR ng/L  --   --  18,297*   HS TNI 2HR ng/L  --  16,953*  --    HS TNI 4HR ng/L 17,101*  --   --      Results from last 7 days   Lab Units 08/30/24  2236   INR  1.37*       Tele: SR    This note was completed in part utilizing M-Modal Fluency Direct Software.  Grammatical errors, random word insertions, spelling mistakes, and incomplete sentences may be an occasional consequence of this system secondary to software limitations, ambient noise, and hardware issues.  If you have any questions or concerns about the content, text, or information contained within the body of this dictation, please contact the provider for clarification.      "

## 2024-09-02 NOTE — ASSESSMENT & PLAN NOTE
Lab Results   Component Value Date    EGFR 9 09/02/2024    EGFR 12 09/01/2024    EGFR 8 08/31/2024    CREATININE 4.48 (H) 09/02/2024    CREATININE 3.74 (H) 09/01/2024    CREATININE 5.19 (H) 08/31/2024     [facility] nephrology recommendations

## 2024-09-02 NOTE — QUICK NOTE
Patient is stable from a nephrology perspective.  Plan for next hemodialysis session tomorrow.  Avoid aggressive potassium repletion in this patient with ESRD.  Please call or text with questions.

## 2024-09-02 NOTE — ASSESSMENT & PLAN NOTE
Significantly elevated troponin without chest pain  Suspect type II non-MI related troponin elevation in setting of acute heart failure versus ACS versus myocarditis  Continue heparin drip, aspirin, statin, Imdur  2D echo pending  Cardiology evaluated, recommending cardiac cath

## 2024-09-03 ENCOUNTER — APPOINTMENT (INPATIENT)
Dept: DIALYSIS | Facility: HOSPITAL | Age: 64
DRG: 981 | End: 2024-09-03
Payer: COMMERCIAL

## 2024-09-03 PROBLEM — R82.71 BACTERIURIA: Status: ACTIVE | Noted: 2024-09-03

## 2024-09-03 LAB
ANION GAP SERPL CALCULATED.3IONS-SCNC: 11 MMOL/L (ref 4–13)
AORTIC VALVE MEAN VELOCITY: 12.5 M/S
APICAL FOUR CHAMBER EJECTION FRACTION: 24 %
APTT PPP: 54 SECONDS (ref 23–34)
APTT PPP: 63 SECONDS (ref 23–34)
ATRIAL RATE: 70 BPM
AV AREA BY CONTINUOUS VTI: 1.1 CM2
AV AREA PEAK VELOCITY: 1 CM2
AV LVOT MEAN GRADIENT: 2 MMHG
AV LVOT PEAK GRADIENT: 3 MMHG
AV MEAN GRADIENT: 7 MMHG
AV PEAK GRADIENT: 16 MMHG
AV VALVE AREA: 1.06 CM2
AV VELOCITY RATIO: 0.44
BASOPHILS # BLD AUTO: 0.04 THOUSANDS/ÂΜL (ref 0–0.1)
BASOPHILS NFR BLD AUTO: 0 % (ref 0–1)
BSA FOR ECHO PROCEDURE: 2.25 M2
BUN SERPL-MCNC: 51 MG/DL (ref 5–25)
CALCIUM SERPL-MCNC: 8.2 MG/DL (ref 8.4–10.2)
CHLORIDE SERPL-SCNC: 96 MMOL/L (ref 96–108)
CO2 SERPL-SCNC: 26 MMOL/L (ref 21–32)
CREAT SERPL-MCNC: 5 MG/DL (ref 0.6–1.3)
DOP CALC AO PEAK VEL: 1.97 M/S
DOP CALC AO VTI: 38.93 CM
DOP CALC LVOT AREA: 2.27 CM2
DOP CALC LVOT CARDIAC INDEX: 1.29 L/MIN/M2
DOP CALC LVOT CARDIAC OUTPUT: 2.9 L/MIN
DOP CALC LVOT DIAMETER: 1.7 CM
DOP CALC LVOT PEAK VEL VTI: 18.12 CM
DOP CALC LVOT PEAK VEL: 0.87 M/S
DOP CALC LVOT STROKE INDEX: 17.8 ML/M2
DOP CALC LVOT STROKE VOLUME: 41.11
E WAVE DECELERATION TIME: 164 MS
E/A RATIO: 0.95
EOSINOPHIL # BLD AUTO: 0.14 THOUSAND/ÂΜL (ref 0–0.61)
EOSINOPHIL NFR BLD AUTO: 2 % (ref 0–6)
ERYTHROCYTE [DISTWIDTH] IN BLOOD BY AUTOMATED COUNT: 14.1 % (ref 11.6–15.1)
FRACTIONAL SHORTENING: 21 (ref 28–44)
GFR SERPL CREATININE-BSD FRML MDRD: 8 ML/MIN/1.73SQ M
GLUCOSE SERPL-MCNC: 181 MG/DL (ref 65–140)
GLUCOSE SERPL-MCNC: 182 MG/DL (ref 65–140)
GLUCOSE SERPL-MCNC: 184 MG/DL (ref 65–140)
GLUCOSE SERPL-MCNC: 210 MG/DL (ref 65–140)
GLUCOSE SERPL-MCNC: 215 MG/DL (ref 65–140)
HCT VFR BLD AUTO: 26.9 % (ref 34.8–46.1)
HGB BLD-MCNC: 8.8 G/DL (ref 11.5–15.4)
IMM GRANULOCYTES # BLD AUTO: 0.12 THOUSAND/UL (ref 0–0.2)
IMM GRANULOCYTES NFR BLD AUTO: 1 % (ref 0–2)
INTERVENTRICULAR SEPTUM IN DIASTOLE (PARASTERNAL SHORT AXIS VIEW): 1.3 CM
INTERVENTRICULAR SEPTUM: 1.3 CM (ref 0.6–1.1)
LAAS-AP2: 29.1 CM2
LAAS-AP4: 25.7 CM2
LEFT ATRIUM AREA SYSTOLE SINGLE PLANE A4C: 26.7 CM2
LEFT ATRIUM SIZE: 4.1 CM
LEFT ATRIUM VOLUME (MOD BIPLANE): 96 ML
LEFT ATRIUM VOLUME INDEX (MOD BIPLANE): 42.7 ML/M2
LEFT INTERNAL DIMENSION IN SYSTOLE: 4.6 CM (ref 2.1–4)
LEFT VENTRICLE DIASTOLIC VOLUME (MOD BIPLANE): 212 ML
LEFT VENTRICLE DIASTOLIC VOLUME INDEX (MOD BIPLANE): 94.2 ML/M2
LEFT VENTRICLE SYSTOLIC VOLUME (MOD BIPLANE): 155 ML
LEFT VENTRICLE SYSTOLIC VOLUME INDEX (MOD BIPLANE): 68.9 ML/M2
LEFT VENTRICULAR INTERNAL DIMENSION IN DIASTOLE: 5.8 CM (ref 3.5–6)
LEFT VENTRICULAR POSTERIOR WALL IN END DIASTOLE: 1.4 CM
LEFT VENTRICULAR STROKE VOLUME: 67 ML
LV EF: 27 %
LVSV (TEICH): 67 ML
LYMPHOCYTES # BLD AUTO: 2.02 THOUSANDS/ÂΜL (ref 0.6–4.47)
LYMPHOCYTES NFR BLD AUTO: 23 % (ref 14–44)
MCH RBC QN AUTO: 34.9 PG (ref 26.8–34.3)
MCHC RBC AUTO-ENTMCNC: 32.7 G/DL (ref 31.4–37.4)
MCV RBC AUTO: 107 FL (ref 82–98)
MONOCYTES # BLD AUTO: 0.57 THOUSAND/ÂΜL (ref 0.17–1.22)
MONOCYTES NFR BLD AUTO: 6 % (ref 4–12)
MV E'TISSUE VEL-LAT: 7 CM/S
MV E'TISSUE VEL-SEP: 7 CM/S
MV PEAK A VEL: 1.37 M/S
MV PEAK E VEL: 130 CM/S
MV STENOSIS PRESSURE HALF TIME: 48 MS
MV VALVE AREA P 1/2 METHOD: 4.58
NEUTROPHILS # BLD AUTO: 6 THOUSANDS/ÂΜL (ref 1.85–7.62)
NEUTS SEG NFR BLD AUTO: 68 % (ref 43–75)
NRBC BLD AUTO-RTO: 0 /100 WBCS
P AXIS: 35 DEGREES
PLATELET # BLD AUTO: 242 THOUSANDS/UL (ref 149–390)
PMV BLD AUTO: 10.4 FL (ref 8.9–12.7)
POTASSIUM SERPL-SCNC: 3.5 MMOL/L (ref 3.5–5.3)
PR INTERVAL: 160 MS
QRS AXIS: 134 DEGREES
QRSD INTERVAL: 162 MS
QT INTERVAL: 472 MS
QTC INTERVAL: 509 MS
RBC # BLD AUTO: 2.52 MILLION/UL (ref 3.81–5.12)
RIGHT ATRIUM AREA SYSTOLE A4C: 22.9 CM2
RIGHT VENTRICLE ID DIMENSION: 4.4 CM
SL CV LEFT ATRIUM LENGTH A2C: 6.5 CM
SL CV LV EF: 23
SL CV PED ECHO LEFT VENTRICLE DIASTOLIC VOLUME (MOD BIPLANE) 2D: 165 ML
SL CV PED ECHO LEFT VENTRICLE SYSTOLIC VOLUME (MOD BIPLANE) 2D: 98 ML
SODIUM SERPL-SCNC: 133 MMOL/L (ref 135–147)
T WAVE AXIS: -55 DEGREES
TR MAX PG: 34 MMHG
TR PEAK VELOCITY: 2.9 M/S
TRICUSPID ANNULAR PLANE SYSTOLIC EXCURSION: 2.6 CM
TRICUSPID VALVE PEAK REGURGITATION VELOCITY: 2.9 M/S
VENTRICULAR RATE: 70 BPM
WBC # BLD AUTO: 8.89 THOUSAND/UL (ref 4.31–10.16)

## 2024-09-03 PROCEDURE — 82948 REAGENT STRIP/BLOOD GLUCOSE: CPT

## 2024-09-03 PROCEDURE — 90935 HEMODIALYSIS ONE EVALUATION: CPT | Performed by: INTERNAL MEDICINE

## 2024-09-03 PROCEDURE — 99232 SBSQ HOSP IP/OBS MODERATE 35: CPT | Performed by: STUDENT IN AN ORGANIZED HEALTH CARE EDUCATION/TRAINING PROGRAM

## 2024-09-03 PROCEDURE — 80048 BASIC METABOLIC PNL TOTAL CA: CPT | Performed by: INTERNAL MEDICINE

## 2024-09-03 PROCEDURE — 85730 THROMBOPLASTIN TIME PARTIAL: CPT | Performed by: STUDENT IN AN ORGANIZED HEALTH CARE EDUCATION/TRAINING PROGRAM

## 2024-09-03 PROCEDURE — 5A1D70Z PERFORMANCE OF URINARY FILTRATION, INTERMITTENT, LESS THAN 6 HOURS PER DAY: ICD-10-PCS | Performed by: INTERNAL MEDICINE

## 2024-09-03 PROCEDURE — 85025 COMPLETE CBC W/AUTO DIFF WBC: CPT | Performed by: INTERNAL MEDICINE

## 2024-09-03 RX ORDER — HYDRALAZINE HYDROCHLORIDE 25 MG/1
25 TABLET, FILM COATED ORAL 3 TIMES DAILY
Qty: 270 TABLET | Refills: 0 | Status: SHIPPED | OUTPATIENT
Start: 2024-09-03 | End: 2024-09-06

## 2024-09-03 RX ORDER — INSULIN LISPRO 100 [IU]/ML
INJECTION, SOLUTION INTRAVENOUS; SUBCUTANEOUS
Qty: 15 ML | Refills: 0 | Status: SHIPPED | OUTPATIENT
Start: 2024-09-03 | End: 2024-09-06

## 2024-09-03 RX ADMIN — FUROSEMIDE 160 MG: 80 TABLET ORAL at 17:37

## 2024-09-03 RX ADMIN — INSULIN GLARGINE 20 UNITS: 100 INJECTION, SOLUTION SUBCUTANEOUS at 09:20

## 2024-09-03 RX ADMIN — SEVELAMER HYDROCHLORIDE 1600 MG: 800 TABLET ORAL at 09:17

## 2024-09-03 RX ADMIN — PANTOPRAZOLE SODIUM 40 MG: 40 TABLET, DELAYED RELEASE ORAL at 05:27

## 2024-09-03 RX ADMIN — DOCUSATE SODIUM 100 MG: 100 CAPSULE, LIQUID FILLED ORAL at 09:17

## 2024-09-03 RX ADMIN — CITALOPRAM HYDROBROMIDE 40 MG: 20 TABLET ORAL at 09:17

## 2024-09-03 RX ADMIN — HEPARIN SODIUM 14.1 UNITS/KG/HR: 10000 INJECTION, SOLUTION INTRAVENOUS at 04:33

## 2024-09-03 RX ADMIN — INSULIN LISPRO 1 UNITS: 100 INJECTION, SOLUTION INTRAVENOUS; SUBCUTANEOUS at 09:21

## 2024-09-03 RX ADMIN — ALLOPURINOL 200 MG: 100 TABLET ORAL at 09:17

## 2024-09-03 RX ADMIN — LEVOTHYROXINE SODIUM 50 MCG: 50 TABLET ORAL at 05:27

## 2024-09-03 RX ADMIN — CLOPIDOGREL BISULFATE 75 MG: 75 TABLET ORAL at 09:17

## 2024-09-03 RX ADMIN — SENNOSIDES AND DOCUSATE SODIUM 1 TABLET: 50; 8.6 TABLET ORAL at 09:17

## 2024-09-03 RX ADMIN — INSULIN LISPRO 2 UNITS: 100 INJECTION, SOLUTION INTRAVENOUS; SUBCUTANEOUS at 21:21

## 2024-09-03 RX ADMIN — INSULIN LISPRO 2 UNITS: 100 INJECTION, SOLUTION INTRAVENOUS; SUBCUTANEOUS at 17:38

## 2024-09-03 RX ADMIN — HEPARIN SODIUM 16.1 UNITS/KG/HR: 10000 INJECTION, SOLUTION INTRAVENOUS at 23:29

## 2024-09-03 RX ADMIN — ASPIRIN 81 MG: 81 TABLET, COATED ORAL at 09:18

## 2024-09-03 RX ADMIN — SEVELAMER HYDROCHLORIDE 1600 MG: 800 TABLET ORAL at 12:05

## 2024-09-03 RX ADMIN — ATORVASTATIN CALCIUM 80 MG: 80 TABLET, FILM COATED ORAL at 09:18

## 2024-09-03 RX ADMIN — HEPARIN SODIUM 2000 UNITS: 1000 INJECTION INTRAVENOUS; SUBCUTANEOUS at 12:05

## 2024-09-03 RX ADMIN — OLANZAPINE 5 MG: 5 TABLET, FILM COATED ORAL at 21:20

## 2024-09-03 RX ADMIN — DOCUSATE SODIUM 100 MG: 100 CAPSULE, LIQUID FILLED ORAL at 17:37

## 2024-09-03 RX ADMIN — SEVELAMER HYDROCHLORIDE 1600 MG: 800 TABLET ORAL at 17:36

## 2024-09-03 RX ADMIN — INSULIN LISPRO 1 UNITS: 100 INJECTION, SOLUTION INTRAVENOUS; SUBCUTANEOUS at 12:10

## 2024-09-03 RX ADMIN — OXYCODONE HYDROCHLORIDE 10 MG: 10 TABLET ORAL at 17:37

## 2024-09-03 RX ADMIN — MIDODRINE HYDROCHLORIDE 5 MG: 5 TABLET ORAL at 09:45

## 2024-09-03 RX ADMIN — ISOSORBIDE MONONITRATE 30 MG: 30 TABLET, EXTENDED RELEASE ORAL at 17:37

## 2024-09-03 NOTE — ASSESSMENT & PLAN NOTE
Significantly elevated troponin without chest pain  Suspect type II non-MI related troponin elevation in setting of acute heart failure versus ACS versus myocarditis  Continue heparin drip, aspirin, statin, Imdur  2D echo pending  Cardiology evaluated, recommending cardiac cath tmr

## 2024-09-03 NOTE — PROGRESS NOTES
NEPHROLOGY PROGRESS NOTE   Dee Dee Shaffer 63 y.o. female MRN: [id number]  Unit/Bed#: E4 -01 Encounter: [id number]      ASSESSMENT & PLAN:  1 ESRD on HD TTS at [facility].  Dry weight was recently adjusted to 108 kg.  Patient seen and examined during dialysis treatment, continue UF as tolerated to bring her to her dry weight.  AV fistula cannulated with good blood flow.    2.  Shortness of breath, volume overload, COVID.  Continue UF as tolerated with dialysis.  COVID management per primary team    #3 hypertension with intradialytic hypotension, continue with midodrine pre dialysis    4 anemia chronic kidney disease, Mircera and Venofer as an outpatient, monitor H&H and recommend blood transfusion for hemoglobin less than 7    #5 mineral bone disease, continue with renal diet and binders with meals    6. left upper extremity AV fistula cannulated with good blood flow      SUBJECTIVE:  Patient seen and examined during dialysis treatment, feeling better, denies significant chest pain or shortness of breath, cough improving, no nausea, no vomiting, no abdominal pain.      OBJECTIVE:  Current Weight: Weight - Scale: 116 kg (256 lb)  Vitals:    09/03/24 0930   BP: 105/84   Pulse: 76   Resp: 16   Temp:    SpO2:        Intake/Output Summary (Last 24 hours) at 9/3/2024 0959  Last data filed at 9/3/2024 0915  Gross per 24 hour   Intake 440 ml   Output 800 ml   Net -360 ml       General: conscious, cooperative, in not acute distress  Eyes: conjunctivae pink, anicteric sclerae  ENT: lips and mucous membranes moist  Neck: supple, no JVD  Chest: clear breath sounds bilateral, no crackles, ronchus or wheezings  CVS: distinct S1 & S2, normal rate, regular rhythm  Abdomen: soft, non-tender, non-distended, normoactive bowel sounds  Extremities: no edema of both legs, AV fistula cannulated with good blood flow  Skin: no rash  Neuro: awake, alert, oriented      Medications:    Current Facility-Administered Medications:      acetaminophen (TYLENOL) tablet 650 mg, 650 mg, Oral, Q4H PRN, Ismael Rivas MD, 650 mg at 09/02/24 1601    allopurinol (ZYLOPRIM) tablet 200 mg, 200 mg, Oral, Daily, Ismael Rivas MD, 200 mg at 09/03/24 0917    aspirin (ECOTRIN LOW STRENGTH) EC tablet 81 mg, 81 mg, Oral, Daily, Ismael Rivas MD, 81 mg at 09/03/24 0918    atorvastatin (LIPITOR) tablet 80 mg, 80 mg, Oral, Daily, Alexander Scherer DO, 80 mg at 09/03/24 0918    citalopram (CeleXA) tablet 40 mg, 40 mg, Oral, Daily, Ismael Rivas MD, 40 mg at 09/03/24 0917    clopidogrel (PLAVIX) tablet 75 mg, 75 mg, Oral, Daily, Ismael Rivas MD, 75 mg at 09/03/24 0917    docusate sodium (COLACE) capsule 100 mg, 100 mg, Oral, BID, Ismael Rivas MD, 100 mg at 09/03/24 0917    ergocalciferol (VITAMIN D2) capsule 50,000 Units, 50,000 Units, Oral, Once per day on Monday Wednesday Friday, Ismael Rivas MD, 50,000 Units at 09/02/24 1252    furosemide (LASIX) tablet 160 mg, 160 mg, Oral, Daily, Birgit Olguin PA-C, 160 mg at 09/02/24 0834    heparin (porcine) 25,000 units in 0.45% NaCl 250 mL infusion (premix), 3-20 Units/kg/hr (Order-Specific), Intravenous, Titrated, Ismael Rivas MD, Last Rate: 12.7 mL/hr at 09/03/24 0433, 14.1 Units/kg/hr at 09/03/24 0433    heparin (porcine) injection 2,000 Units, 2,000 Units, Intravenous, Q6H PRN, Ismael Rivas MD, 2,000 Units at 09/01/24 1554    heparin (porcine) injection 4,000 Units, 4,000 Units, Intravenous, Q6H PRN, Ismael Rivas MD    insulin glargine (LANTUS) subcutaneous injection 20 Units 0.2 mL, 20 Units, Subcutaneous, QAM, Ismael Rivas MD, 20 Units at 09/03/24 0920    insulin lispro (HumALOG/ADMELOG) 100 units/mL subcutaneous injection 1-6 Units, 1-6 Units, Subcutaneous, 4x Daily (AC & HS), 1 Units at 09/03/24 0921 **AND** Fingerstick Glucose (POCT), , , 4x Daily AC and at bedtime, Garcia Lopez,     isosorbide mononitrate (IMDUR) 24 hr tablet 30 mg, 30 mg, Oral, QPM, Ismael Rivas MD, 30 mg at 09/02/24 1710     lactulose (CHRONULAC) oral solution 20 g, 20 g, Oral, Daily PRN, Ismael Rivas MD    levothyroxine tablet 50 mcg, 50 mcg, Oral, Daily Before Breakfast, Ismael Rivas MD, 50 mcg at 09/03/24 0527    [START ON 9/4/2024] losartan (COZAAR) tablet 25 mg, 25 mg, Oral, Daily, Kenneth Burgos PA-C    midodrine (PROAMATINE) tablet 5 mg, 5 mg, Oral, Before Dialysis, Isamel Rivas MD, 5 mg at 09/03/24 0945    OLANZapine (ZyPREXA) tablet 5 mg, 5 mg, Oral, HS, Ismael Rivas MD, 5 mg at 09/02/24 2228    ondansetron (ZOFRAN) injection 4 mg, 4 mg, Intravenous, Q6H PRN, Ismael Rivas MD    oxyCODONE (ROXICODONE) immediate release tablet 10 mg, 10 mg, Oral, Q4H PRN, Ismael Rivas MD, 10 mg at 09/02/24 1600    oxyCODONE (ROXICODONE) IR tablet 5 mg, 5 mg, Oral, Q4H PRN, Ismael Rivas MD, 5 mg at 09/01/24 1559    pantoprazole (PROTONIX) EC tablet 40 mg, 40 mg, Oral, Early Morning, Ismael Rivas MD, 40 mg at 09/03/24 0527    senna-docusate sodium (SENOKOT S) 8.6-50 mg per tablet 1 tablet, 1 tablet, Oral, Daily, Ismael Rivas MD, 1 tablet at 09/03/24 0917    sevelamer (RENAGEL) tablet 1,600 mg, 1,600 mg, Oral, TID With Meals, Birgit Olguin PA-C, 1,600 mg at 09/03/24 0917    Invasive Devices:        Lab Results:   Results from last 7 days   Lab Units 09/03/24  0512 09/02/24  0434 09/01/24  0551 08/31/24  0545 08/31/24  0023 08/30/24  1528   WBC Thousand/uL 8.89 8.08 8.13 7.31   < > 8.02   HEMOGLOBIN g/dL 8.8* 8.3* 8.2* 8.3*   < > 9.6*   HEMATOCRIT % 26.9* 25.8* 24.6* 24.8*   < > 28.1*   PLATELETS Thousands/uL 242 236 221 206   < > 225   SODIUM mmol/L 133* 135 135 132*  --  131*   POTASSIUM mmol/L 3.5 3.4* 3.6 3.7  --  3.9   CHLORIDE mmol/L 96 98 96 92*  --  90*   CO2 mmol/L 26 26 28 25  --  23   BUN mg/dL 51* 42* 34* 55*  --  50*   CREATININE mg/dL 5.00* 4.48* 3.74* 5.19*  --  4.56*   CALCIUM mg/dL 8.2* 8.0* 7.9* 8.0*  --  8.4   MAGNESIUM mg/dL  --   --  2.2 2.7  --   --    PHOSPHORUS mg/dL  --   --   --  5.2*  --   --    ALK PHOS U/L   "--   --   --  85  --  93   ALT U/L  --   --   --  140*  --  91*   AST U/L  --   --   --  223*  --  188*    < > = values in this interval not displayed.       Previous work up:  See previous notes      Portions of the record may have been created with voice recognition software. Occasional wrong word or \"sound a like\" substitutions may have occurred due to the inherent limitations of voice recognition software. Read the chart carefully and recognize, using context, where substitutions have occurred.If you have any questions, please contact the dictating provider.  " right lower extremity findings/no redness or swelling, no point tenderness, FROM no redness or swelling, no point tenderness, Limted full flexion of R knee, no swelling/erythema/right lower extremity findings

## 2024-09-03 NOTE — CASE MANAGEMENT
Case Management Assessment & Discharge Planning Note    Patient name Dee Dee Shaffer  Location [facility] /E4 -* MRN [id number]  : 1960 Date 9/3/2024       Current Admission Date: 2024  Current Admission Diagnosis:Acute decompensated heart failure (HCC)   Patient Active Problem List    Diagnosis Date Noted Date Diagnosed    COVID-19 2024     Acute decompensated heart failure (HCC) 2024     Hypertension      Constipation 2024     Finger wound, simple, open, subsequent encounter 2024     Other specified peripheral vascular diseases (HCC) 2024     Obesity, morbid ([facility]) 2024     Secondary hyperparathyroidism of renal origin ([facility]) 2024     Chronic obstructive pulmonary disease, unspecified COPD type ([facility]) 2023     Chronic pain disorder      Acute cystitis without hematuria 2023     Physical deconditioning 2023     Acquired absence of other left toe(s) ([facility]) 2023     Dependence on renal dialysis ([facility]) 2023     Polyarthralgia 2022     Chronic left shoulder pain 2022     NSVT (nonsustained ventricular tachycardia) ([facility]) 2022     History of anemia due to chronic kidney disease 2022     Troponin level elevated 2022     Continuous opioid dependence ([facility]) 02/10/2022     Adrenal nodule ([facility]) 02/10/2022     Suprapubic catheter ([facility]) 02/10/2022     Acquired hypothyroidism 10/14/2021     Mixed hyperlipidemia 10/14/2021     Gastroesophageal reflux disease without esophagitis 10/13/2021     Anemia due to chronic kidney disease, on chronic dialysis ([facility]) 2021     Chronic combined systolic and diastolic congestive heart failure ([facility]) 2021     Prolonged QT interval 2021     Neurogenic bladder 2021     History of heart artery stent 06/15/2021     CKD (chronic kidney disease) stage 4, GFR 15-29 ml/min ([facility]) 2021     ESRD (end stage renal disease) ([facility]) 2021     Memory  problem 05/26/2021     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 ([facility]) 12/21/2020     Problem with vascular access 11/11/2020     Anemia 09/09/2020     S/P cervical spinal fusion 09/09/2020     Type 2 diabetes mellitus with chronic kidney disease on chronic dialysis, with long-term current use of insulin ([facility]) 09/02/2020     Severe episode of recurrent major depressive disorder, without psychotic features ([facility]) 09/02/2020     Coronary artery disease with stable angina pectoris ([facility]) 09/02/2020       LOS (days): 4  Geometric Mean LOS (GMLOS) (days):   Days to GMLOS:     OBJECTIVE:    Risk of Unplanned Readmission Score: 34.89         Current admission status: Inpatient       Preferred Pharmacy:   [facility] [place] - [address] [facility]  [address] [place] [address]  Phone: [phone] Fax: [phone]    [id number] - [place] - [address] [facility] [address]  [address] [facility] [address]  Phone: [phone] Fax: [phone]    Primary Care Provider: CHERELLE Franklin    Primary Insurance: [facility]  Secondary Insurance: [facility]    ASSESSMENT:  Active Health Care Proxies       Yenny Shaffer Health Care Representative - Daughter   Primary Phone: [phone] (Mobile)                 Advance Directives  Does patient have a Health Care POA?: No  Was patient offered paperwork?: Yes (Declined)  Does patient currently have a Health Care decision maker?: Yes, please see Health Care Proxy section  Does patient have Advance Directives?: No  Was patient offered paperwork?: Yes (Declined)  Primary Contact: Yenny Oskaralex (Daughter)   [phone]         Readmission Root Cause  30 Day Readmission: No    Patient Information  Admitted from:: Home  Mental Status: Alert  During Assessment patient was accompanied by: Not accompanied during assessment  Assessment information provided by:: Daughter  Primary  Caregiver: Child  Caregiver's Name:: Yenny Shaffer (Daughter)   [phone]  Caregiver's Relationship to Patient:: Family Member  Caregiver's Telephone Number:: Yenny Shaffer (Daughter)   [phone]  Support Systems: Family members, Daughter  County of Residence: [place]  What city do you live in?: [place]  Home entry access options. Select all that apply.: Stairs  Number of steps to enter home.: 4  Do the steps have railings?: Yes  Type of Current Residence: Apartment  Floor Level: 1  Upon entering residence, is there a bedroom on the main floor (no further steps)?: Yes  Upon entering residence, is there a bathroom on the main floor (no further steps)?: Yes  Living Arrangements: Lives w/ Daughter  Is patient a ?: No    Activities of Daily Living Prior to Admission  Functional Status: Assistance  Completes ADLs independently?: No  Level of ADL dependence: Assistance  Ambulates independently?: Yes  Does patient use assisted devices?: Yes  Assisted Devices (DME) used: Rollator, Shower Chair, Hospital Bed  Does patient currently own DME?: Yes  What DME does the patient currently own?: Hospital Bed, Rollator, Shower Chair, Stair Chair/[place]  Does patient have a history of Outpatient Therapy (PT/OT)?: No  Does the patient have a history of Short-Term Rehab?: No  Does patient have a history of HHC?: No  Does patient currently have HHC?: No         Patient Information Continued  Income Source: SSI/SSD  Does patient have prescription coverage?: Yes  Does patient receive dialysis treatments?: Yes (T,TH, [facility] , [facility])  Does patient have a history of substance abuse?: No  Does patient have a history of Mental Health Diagnosis?: Yes (Depression, anxiety, Major depressive disorder)  Is patient receiving treatment for mental health?: No. Patient declined treatment information.  Has patient received inpatient treatment related to mental health in the last 2 years?: No         Means of Transportation  Means  of Transport to Appts:: [facility]      Social Determinants of Health (SDOH)      Flowsheet Row Most Recent Value   Housing Stability    In the last 12 months, was there a time when you were not able to pay the mortgage or rent on time? N   In the past 12 months, how many times have you moved where you were living? 1   At any time in the past 12 months, were you homeless or living in a shelter (including now)? N   Transportation Needs    In the past 12 months, has lack of transportation kept you from medical appointments or from getting medications? no   In the past 12 months, has lack of transportation kept you from meetings, work, or from getting things needed for daily living? No   Food Insecurity    Within the past 12 months, you worried that your food would run out before you got the money to buy more. Never true   Within the past 12 months, the food you bought just didn't last and you didn't have money to get more. Never true   Utilities    In the past 12 months has the electric, gas, oil, or water company threatened to shut off services in your home? No            DISCHARGE DETAILS:    Discharge planning discussed with:: Patient's daughter  Freedom of Choice: Yes  Comments - Freedom of Choice: OP PT  CM contacted family/caregiver?: Yes  Were Treatment Team discharge recommendations reviewed with patient/caregiver?: Yes  Did patient/caregiver verbalize understanding of patient care needs?: Yes  Were patient/caregiver advised of the risks associated with not following Treatment Team discharge recommendations?: Yes    Contacts  Patient Contacts: Yenny Shaffer (Daughter)   [phone] (M)  Relationship to Patient:: Family  Contact Method: Phone  Phone Number: Yenny Shaffer (Daughter)   [phone]  Reason/Outcome: Continuity of Care, Emergency Contact, Discharge Planning    Requested Home Health Care         Is the patient interested in HHC at discharge?: No    DME Referral Provided  Referral made for DME?: No          Would you like to participate in our [facility] service program?  : No - Declined    Treatment Team Recommendation: Home  Discharge Destination Plan:: Home  Transport at Discharge : Family                             IMM Given (Date):: 09/03/24  IMM Given to:: Family        IMM reviewed with patient's caregiver, patient's caregiver agrees with discharge determination.    CM spoke with patient's daughter via PC to introduce self and role with DC planning. Patient resides with her daughter and daughters paramour in an apartment with 4 steps to enter.  Patient and family will be moving this week to [address] [place] [address] [place].  This home will have no steps to enter and will have a chair glide.  Patient currently has a rollator, hospital bed, SPC, and shower chair.  Patient receives HD T, TH, S at [facility] and is transported by [facility].  Patient's daughter will be calling to arrange transfer to [facility] in [place], CM encouraged her to do this as soon as possible. Family will continue to provide transport to dialysis until new office is established.  Patient's daughter is a paid caregiver, 54 hours a week through [employer].  Patient requires assistance with bathing, dressing,and IADL's.  CM reviewed therapy recommendation of HHC vs OP PT, family would prefer OP PT at this time due to move.  Family understands that if patient wants HHC in the future they can go through their PCP.  Patient's family will transport her home at time of DC.  Family does not anticipate additional CM needs at this time, CM department remains available.

## 2024-09-03 NOTE — ASSESSMENT & PLAN NOTE
History of coronary artery disease with multiple coronary artery stents  Continue medical management with Plavix, statin, heparin drip, Imdur  Tentatively planning for cardiac cath tomorrow  NPO midnight  2D echo pending

## 2024-09-03 NOTE — PLAN OF CARE
Pre-tx:  UF 2-3 L as tolerated toward EDW.  Midodrine given during start of HD for hypotension.  BETTIE AVF cannulated without issue.  4K bath per serum K of 3.5.      Post-Dialysis RN Treatment Note    Blood Pressure:  Pre 127/81 mm/Hg  Post  145/68 mmHg   EDW  108.5 kg    Weight:  Pre  112.6 kg   Post 109.6 kg   Mode of weight measurement: Bed Scale   Volume Removed   3000 ml    Treatment duration 3 hours    NS given  No    Treatment shortened? No   Medications given during Rx Not Applicable   Estimated Kt/V  Not Applicable   Access type: AV fistula   Access Issues: No    Report called to primary nurse   Yes          Problem: METABOLIC, FLUID AND ELECTROLYTES - ADULT  Goal: Electrolytes maintained within normal limits  Description: INTERVENTIONS:  - Monitor labs and assess patient for signs and symptoms of electrolyte imbalances  - Administer electrolyte replacement as ordered  - Monitor response to electrolyte replacements, including repeat lab results as appropriate  - Instruct patient on fluid and nutrition as appropriate  Outcome: Progressing  Goal: Fluid balance maintained  Description: INTERVENTIONS:  - Monitor labs   - Monitor I/O and WT  - Instruct patient on fluid and nutrition as appropriate  - Assess for signs & symptoms of volume excess or deficit  Outcome: Progressing

## 2024-09-03 NOTE — ASSESSMENT & PLAN NOTE
History of ESRD nonoliguric, with chronic [place] catheter  UA did grow Pseudomonas Enterobacter, suspect likely colonization without any urinary symptoms  Monitor off antibiotics

## 2024-09-03 NOTE — ASSESSMENT & PLAN NOTE
Lab Results   Component Value Date    EGFR 8 09/03/2024    EGFR 9 09/02/2024    EGFR 12 09/01/2024    CREATININE 5.00 (H) 09/03/2024    CREATININE 4.48 (H) 09/02/2024    CREATININE 3.74 (H) 09/01/2024     Appreciate nephrology recommendations

## 2024-09-03 NOTE — PLAN OF CARE
Problem: Potential for Falls  Goal: Patient will remain free of falls  Description: INTERVENTIONS:  - Educate patient/family on patient safety including physical limitations  - Instruct patient to call for assistance with activity   - Consult OT/PT to assist with strengthening/mobility   - Keep Call bell within reach  - Keep bed low and locked with side rails adjusted as appropriate  - Keep care items and personal belongings within reach  - Initiate and maintain comfort rounds  - Make Fall Risk Sign visible to staff  - Offer Toileting every 2 Hours, in advance of need  - Initiate/Maintain bed alarm  - Obtain necessary fall risk management equipment: In place  - Apply yellow socks and bracelet for high fall risk patients  - Consider moving patient to room near nurses station  Outcome: Progressing     Problem: Prexisting or High Potential for Compromised Skin Integrity  Goal: Skin integrity is maintained or improved  Description: INTERVENTIONS:  - Identify patients at risk for skin breakdown  - Assess and monitor skin integrity  - Assess and monitor nutrition and hydration status  - Monitor labs   - Assess for incontinence   - Turn and reposition patient  - Assist with mobility/ambulation  - Relieve pressure over bony prominences  - Avoid friction and shearing  - Provide appropriate hygiene as needed including keeping skin clean and dry  - Evaluate need for skin moisturizer/barrier cream  - Collaborate with interdisciplinary team   - Patient/family teaching  - Consider wound care consult   Outcome: Progressing     Problem: PAIN - ADULT  Goal: Verbalizes/displays adequate comfort level or baseline comfort level  Description: Interventions:  - Encourage patient to monitor pain and request assistance  - Assess pain using appropriate pain scale  - Administer analgesics based on type and severity of pain and evaluate response  - Implement non-pharmacological measures as appropriate and evaluate response  - Consider  cultural and social influences on pain and pain management  - Notify physician/advanced practitioner if interventions unsuccessful or patient reports new pain  Outcome: Progressing     Problem: INFECTION - ADULT  Goal: Absence or prevention of progression during hospitalization  Description: INTERVENTIONS:  - Assess and monitor for signs and symptoms of infection  - Monitor lab/diagnostic results  - Monitor all insertion sites, i.e. indwelling lines, tubes, and drains  - Monitor endotracheal if appropriate and nasal secretions for changes in amount and color  - [place] appropriate cooling/warming therapies per order  - Administer medications as ordered  - Instruct and encourage patient and family to use good hand hygiene technique  - Identify and instruct in appropriate isolation precautions for identified infection/condition  Outcome: Progressing  Goal: Absence of fever/infection during neutropenic period  Description: INTERVENTIONS:  - Monitor WBC    Outcome: Progressing     Problem: SAFETY ADULT  Goal: Patient will remain free of falls  Description: INTERVENTIONS:  - Educate patient/family on patient safety including physical limitations  - Instruct patient to call for assistance with activity   - Consult OT/PT to assist with strengthening/mobility   - Keep Call bell within reach  - Keep bed low and locked with side rails adjusted as appropriate  - Keep care items and personal belongings within reach  - Initiate and maintain comfort rounds  - Make Fall Risk Sign visible to staff  - Offer Toileting every 2 Hours, in advance of need  - Initiate/Maintain bed alarm  - Obtain necessary fall risk management equipment: In place   - Apply yellow socks and bracelet for high fall risk patients  - Consider moving patient to room near nurses station  Outcome: Progressing  Goal: Maintain or return to baseline ADL function  Description: INTERVENTIONS:  -  Assess patient's ability to carry out ADLs; assess patient's baseline  for ADL function and identify physical deficits which impact ability to perform ADLs (bathing, care of mouth/teeth, toileting, grooming, dressing, etc.)  - Assess/evaluate cause of self-care deficits   - Assess range of motion  - Assess patient's mobility; develop plan if impaired  - Assess patient's need for assistive devices and provide as appropriate  - Encourage maximum independence but intervene and supervise when necessary  - Involve family in performance of ADLs  - Assess for home care needs following discharge   - Consider OT consult to assist with ADL evaluation and planning for discharge  - Provide patient education as appropriate  Outcome: Progressing  Goal: Maintains/Returns to pre admission functional level  Description: INTERVENTIONS:  - Perform AM-PAC 6 Click Basic Mobility/ Daily Activity assessment daily.  - Set and communicate daily mobility goal to care team and patient/family/caregiver.   - Collaborate with rehabilitation services on mobility goals if consulted  - Perform Range of Motion 3 times a day.  - Reposition patient every 2 hours.  - Dangle patient 3 times a day  - Stand patient 3 times a day  - Ambulate patient 3 times a day  - Out of bed to chair 3 times a day   - Out of bed for meals 3 times a day  - Out of bed for toileting  - Record patient progress and toleration of activity level   Outcome: Progressing     Problem: DISCHARGE PLANNING  Goal: Discharge to home or other facility with appropriate resources  Description: INTERVENTIONS:  - Identify barriers to discharge w/patient and caregiver  - Arrange for needed discharge resources and transportation as appropriate  - Identify discharge learning needs (meds, wound care, etc.)  - Arrange for interpretive services to assist at discharge as needed  - Refer to Case Management Department for coordinating discharge planning if the patient needs post-hospital services based on physician/advanced practitioner order or complex needs related to  functional status, cognitive ability, or social support system  Outcome: Progressing     Problem: Knowledge Deficit  Goal: Patient/family/caregiver demonstrates understanding of disease process, treatment plan, medications, and discharge instructions  Description: Complete learning assessment and assess knowledge base.  Interventions:  - Provide teaching at level of understanding  - Provide teaching via preferred learning methods  Outcome: Progressing     Problem: CARDIOVASCULAR - ADULT  Goal: Maintains optimal cardiac output and hemodynamic stability  Description: INTERVENTIONS:  - Monitor I/O, vital signs and rhythm  - Monitor for S/S and trends of decreased cardiac output  - Administer and titrate ordered vasoactive medications to optimize hemodynamic stability  - Assess quality of pulses, skin color and temperature  - Assess for signs of decreased coronary artery perfusion  - Instruct patient to report change in severity of symptoms  Outcome: Progressing  Goal: Absence of cardiac dysrhythmias or at baseline rhythm  Description: INTERVENTIONS:  - Continuous cardiac monitoring, vital signs, obtain 12 lead EKG if ordered  - Administer antiarrhythmic and heart rate control medications as ordered  - Monitor electrolytes and administer replacement therapy as ordered  Outcome: Progressing     Problem: RESPIRATORY - ADULT  Goal: Achieves optimal ventilation and oxygenation  Description: INTERVENTIONS:  - Assess for changes in respiratory status  - Assess for changes in mentation and behavior  - Position to facilitate oxygenation and minimize respiratory effort  - Oxygen administered by appropriate delivery if ordered  - Initiate smoking cessation education as indicated  - Encourage broncho-pulmonary hygiene including cough, deep breathe, Incentive Spirometry  - Assess the need for suctioning and aspirate as needed  - Assess and instruct to report SOB or any respiratory difficulty  - Respiratory Therapy support as  indicated  Outcome: Progressing     Problem: GASTROINTESTINAL - ADULT  Goal: Minimal or absence of nausea and/or vomiting  Description: INTERVENTIONS:  - Administer IV fluids if ordered to ensure adequate hydration  - Maintain NPO status until nausea and vomiting are resolved  - Nasogastric tube if ordered  - Administer ordered antiemetic medications as needed  - Provide nonpharmacologic comfort measures as appropriate  - Advance diet as tolerated, if ordered  - Consider nutrition services referral to assist patient with adequate nutrition and appropriate food choices  Outcome: Progressing  Goal: Maintains or returns to baseline bowel function  Description: INTERVENTIONS:  - Assess bowel function  - Encourage oral fluids to ensure adequate hydration  - Administer IV fluids if ordered to ensure adequate hydration  - Administer ordered medications as needed  - Encourage mobilization and activity  - Consider nutritional services referral to assist patient with adequate nutrition and appropriate food choices  Outcome: Progressing  Goal: Maintains adequate nutritional intake  Description: INTERVENTIONS:  - Monitor percentage of each meal consumed  - Identify factors contributing to decreased intake, treat as appropriate  - Assist with meals as needed  - Monitor I&O, weight, and lab values if indicated  - Obtain nutrition services referral as needed  Outcome: Progressing  Goal: Establish and maintain optimal ostomy function  Description: INTERVENTIONS:  - Assess bowel function  - Encourage oral fluids to ensure adequate hydration  - Administer IV fluids if ordered to ensure adequate hydration   - Administer ordered medications as needed  - Encourage mobilization and activity  - Nutrition services referral to assist patient with appropriate food choices  - Assess stoma site  - Consider wound care consult   Outcome: Progressing  Goal: Oral mucous membranes remain intact  Description: INTERVENTIONS  - Assess oral mucosa and  hygiene practices  - Implement preventative oral hygiene regimen  - Implement oral medicated treatments as ordered  - Initiate Nutrition services referral as needed  Outcome: Progressing     Problem: GENITOURINARY - ADULT  Goal: Maintains or returns to baseline urinary function  Description: INTERVENTIONS:  - Assess urinary function  - Encourage oral fluids to ensure adequate hydration if ordered  - Administer IV fluids as ordered to ensure adequate hydration  - Administer ordered medications as needed  - Offer frequent toileting  - Follow urinary retention protocol if ordered  Outcome: Progressing  Goal: Absence of urinary retention  Description: INTERVENTIONS:  - Assess patient’s ability to void and empty bladder  - Monitor I/O  - Bladder scan as needed  - Discuss with physician/AP medications to alleviate retention as needed  - Discuss catheterization for long term situations as appropriate  Outcome: Progressing  Goal: Urinary catheter remains patent  Description: INTERVENTIONS:  - Assess patency of urinary catheter  - If patient has a chronic morales, consider changing catheter if non-functioning  - Follow guidelines for intermittent irrigation of non-functioning urinary catheter  Outcome: Progressing     Problem: METABOLIC, FLUID AND ELECTROLYTES - ADULT  Goal: Electrolytes maintained within normal limits  Description: INTERVENTIONS:  - Monitor labs and assess patient for signs and symptoms of electrolyte imbalances  - Administer electrolyte replacement as ordered  - Monitor response to electrolyte replacements, including repeat lab results as appropriate  - Instruct patient on fluid and nutrition as appropriate  Outcome: Progressing  Goal: Fluid balance maintained  Description: INTERVENTIONS:  - Monitor labs   - Monitor I/O and WT  - Instruct patient on fluid and nutrition as appropriate  - Assess for signs & symptoms of volume excess or deficit  Outcome: Progressing  Goal: Glucose maintained within target  range  Description: INTERVENTIONS:  - Monitor Blood Glucose as ordered  - Assess for signs and symptoms of hyperglycemia and hypoglycemia  - Administer ordered medications to maintain glucose within target range  - Assess nutritional intake and initiate nutrition service referral as needed  Outcome: Progressing     Problem: Nutrition/Hydration-ADULT  Goal: Nutrient/Hydration intake appropriate for improving, restoring or maintaining nutritional needs  Description: Monitor and assess patient's nutrition/hydration status for malnutrition. Collaborate with interdisciplinary team and initiate plan and interventions as ordered.  Monitor patient's weight and dietary intake as ordered or per policy. Utilize nutrition screening tool and intervene as necessary. Determine patient's food preferences and provide high-protein, high-caloric foods as appropriate.     INTERVENTIONS:  - Monitor oral intake, urinary output, labs, and treatment plans  - Assess nutrition and hydration status and recommend course of action  - Evaluate amount of meals eaten  - Assist patient with eating if necessary   - Allow adequate time for meals  - Recommend/ encourage appropriate diets, oral nutritional supplements, and vitamin/mineral supplements  - Order, calculate, and assess calorie counts as needed  - Recommend, monitor, and adjust tube feedings and TPN/PPN based on assessed needs  - Assess need for intravenous fluids  - Provide specific nutrition/hydration education as appropriate  - Include patient/family/caregiver in decisions related to nutrition  Outcome: Progressing

## 2024-09-03 NOTE — ASSESSMENT & PLAN NOTE
Suspect this is contributing to admitting symptoms, possibly complicated by myocarditis  Completed 3 days of remdesivir

## 2024-09-03 NOTE — PROGRESS NOTES
[facility]  Progress Note  Name: Dee Dee Shaffer I  MRN: [id number]  Unit/Bed#: E4 -01 I Date of Admission: 8/30/2024   Date of Service: 9/3/2024 I Hospital Day: 4    Assessment & Plan   Bacteriuria  Assessment & Plan  History of ESRD nonoliguric, with chronic Camargo catheter  UA did grow Pseudomonas Enterobacter, suspect likely colonization without any urinary symptoms  Monitor off antibiotics    COVID-19  Assessment & Plan  Suspect this is contributing to admitting symptoms, possibly complicated by myocarditis  Completed 3 days of remdesivir    Hypertension  Assessment & Plan  Continue home BP meds    Chronic obstructive pulmonary disease, unspecified COPD type ([facility])  Assessment & Plan  Not currently in exacerbation    Troponin level elevated  Assessment & Plan  Significantly elevated troponin without chest pain  Suspect type II non-MI related troponin elevation in setting of acute heart failure versus ACS versus myocarditis  Continue heparin drip, aspirin, statin, Imdur  2D echo pending  Cardiology evaluated, recommending cardiac cath tmr    Coronary artery disease with stable angina pectoris ([facility])  Assessment & Plan  History of coronary artery disease with multiple coronary artery stents  Continue medical management with Plavix, statin, heparin drip, Imdur  Tentatively planning for cardiac cath tomorrow  NPO midnight  2D echo pending    Mixed hyperlipidemia  Assessment & Plan  Continue statin therapy    Anemia due to chronic kidney disease, on chronic dialysis ([facility])  Assessment & Plan  Continue to monitor, transfuse as needed    Neurogenic bladder  Assessment & Plan  Chronic Camargo catheter    ESRD (end stage renal disease) ([facility])  Assessment & Plan  Lab Results   Component Value Date    EGFR 8 09/03/2024    EGFR 9 09/02/2024    EGFR 12 09/01/2024    CREATININE 5.00 (H) 09/03/2024    CREATININE 4.48 (H) 09/02/2024    CREATININE 3.74 (H) 09/01/2024     Appreciate nephrology  recommendations    Type 2 diabetes mellitus with chronic kidney disease on chronic dialysis, with long-term current use of insulin (HCC)  Assessment & Plan  Continue basal bolus regimen plus sliding scale insulin      * Acute decompensated heart failure (HCC)  Assessment & Plan  Wt Readings from Last 3 Encounters:   24 116 kg (256 lb)   24 108 kg (237 lb)   24 109 kg (240 lb)     Patient reports generalized weakness.  Suspect volume overload secondary to increased fluid intake  Continue Lasix 160mg po  Continue volume removal with hemodialysis  Patient symptoms appear to improve, currently on room air.  Denies any shortness of breath             VTE Pharmacologic Prophylaxis:   Pharmacologic: Heparin Drip  Mechanical VTE Prophylaxis in Place: Yes    Current Length of Stay: 4 day(s)    Current Patient Status: Inpatient   Certification Statement: The patient will continue to require additional inpatient hospital stay due to Pending cardiac cath    Discharge Plan: pending    Code Status: Level 1 - Full Code      Subjective:   No events overnight. Denies any CP or SOB. Tolerating diet. Seen while undergoing dialysis today.    Objective:     Vitals:   Temp (24hrs), Av.4 °F (36.3 °C), Min:96.7 °F (35.9 °C), Max:97.8 °F (36.6 °C)    Temp:  [96.7 °F (35.9 °C)-97.8 °F (36.6 °C)] 97.6 °F (36.4 °C)  HR:  [70-96] 78  Resp:  [16-18] 16  BP: ()/(57-84) 142/57  SpO2:  [98 %-99 %] 98 %  Body mass index is 40.1 kg/m².     Input and Output Summary (last 24 hours):       Intake/Output Summary (Last 24 hours) at 9/3/2024 1140  Last data filed at 9/3/2024 0915  Gross per 24 hour   Intake 440 ml   Output 800 ml   Net -360 ml       Physical Exam:     Physical Exam  Vitals and nursing note reviewed.   Constitutional:       Appearance: She is obese.   HENT:      Head: Normocephalic.   Eyes:      Conjunctiva/sclera: Conjunctivae normal.   Cardiovascular:      Rate and Rhythm: Normal rate.   Pulmonary:      Effort:  Pulmonary effort is normal. No respiratory distress.   Abdominal:      General: Bowel sounds are normal. There is no distension.      Palpations: Abdomen is soft.   Musculoskeletal:         General: No swelling.      Right lower leg: No edema.      Left lower leg: No edema.   Skin:     General: Skin is warm and dry.   Neurological:      General: No focal deficit present.      Mental Status: She is alert. Mental status is at baseline.         Additional Data:     Labs:    Results from last 7 days   Lab Units 09/03/24  0512   WBC Thousand/uL 8.89   HEMOGLOBIN g/dL 8.8*   HEMATOCRIT % 26.9*   PLATELETS Thousands/uL 242   SEGS PCT % 68   LYMPHO PCT % 23   MONO PCT % 6   EOS PCT % 2     Results from last 7 days   Lab Units 09/03/24  0512 09/01/24  0551 08/31/24  0545   SODIUM mmol/L 133*   < > 132*   POTASSIUM mmol/L 3.5   < > 3.7   CHLORIDE mmol/L 96   < > 92*   CO2 mmol/L 26   < > 25   BUN mg/dL 51*   < > 55*   CREATININE mg/dL 5.00*   < > 5.19*   ANION GAP mmol/L 11   < > 15*   CALCIUM mg/dL 8.2*   < > 8.0*   ALBUMIN g/dL  --   --  3.5   TOTAL BILIRUBIN mg/dL  --   --  0.56   ALK PHOS U/L  --   --  85   ALT U/L  --   --  140*   AST U/L  --   --  223*   GLUCOSE RANDOM mg/dL 182*   < > 170*    < > = values in this interval not displayed.     Results from last 7 days   Lab Units 08/30/24  2236   INR  1.37*     Results from last 7 days   Lab Units 09/03/24  0910 09/02/24  2127 09/02/24  1543 09/02/24  1141 09/02/24  0835 09/01/24  2044 09/01/24  1623 09/01/24  1117 09/01/24  0739 08/31/24  2110 08/31/24  1603 08/31/24  1106   POC GLUCOSE mg/dl 184* 224* 275* 220* 140 163* 158* 202* 145* 157* 113 206*     Results from last 7 days   Lab Units 08/31/24  1412   HEMOGLOBIN A1C % 6.5*               * I Have Reviewed All Lab Data Listed Above.  * Additional Pertinent Lab Tests Reviewed: All Labs For Current Hospital Admission Reviewed    Mobility:  Basic Mobility Inpatient Raw Score: 18  [facility] Goal: 6: Walk 10 steps or  more  -[facility] Achieved: 3: Sit at edge of bed    Lines:     Invasive Devices       Peripheral Intravenous Line  Duration             Peripheral IV 08/30/24 Right Antecubital 3 days              Line  Duration             Hemodialysis Access 02/06/23 Left Upper arm 575 days              Drain  Duration             Suprapubic Catheter 16 Fr. 521 days    Suprapubic Catheter 16 Fr. 32 days                       Imaging:    Imaging Reports Reviewed Today Include:     XR chest 2 views    Result Date: 8/30/2024  Impression: Small bilateral pleural effusions and mild to moderate pulmonary interstitial edema. Workstation performed: [id number]        Recent Cultures (last 7 days):     Results from last 7 days   Lab Units 08/30/24  1854   URINE CULTURE  >100,000 cfu/ml Enterobacter cloacae*  >100,000 cfu/ml Pseudomonas aeruginosa*       Last 24 Hours Medication List:   Current Facility-Administered Medications   Medication Dose Route Frequency Provider Last Rate    acetaminophen  650 mg Oral Q4H PRN Ismael Rivas MD      allopurinol  200 mg Oral Daily Ismael Rivas MD      aspirin  81 mg Oral Daily Ismael Rivas MD      atorvastatin  80 mg Oral Daily Alexander Scherer DO      citalopram  40 mg Oral Daily Ismael Rivas MD      clopidogrel  75 mg Oral Daily Ismael Rivas MD      docusate sodium  100 mg Oral BID Ismael Rivas MD      ergocalciferol  50,000 Units Oral Once per day on Monday Wednesday Friday Ismael Rivas MD      furosemide  160 mg Oral Daily Birgit Olguin PA-C      heparin (porcine)  3-20 Units/kg/hr (Order-Specific) Intravenous Titrated Ismael Rivas MD 14.1 Units/kg/hr (09/03/24 0433)    heparin (porcine)  2,000 Units Intravenous Q6H PRN Ismael Rivas MD      heparin (porcine)  4,000 Units Intravenous Q6H PRN Ismael Rivas MD      insulin glargine  20 Units Subcutaneous QAM Ismael Rivas MD      insulin lispro  1-6 Units Subcutaneous 4x Daily (AC & HS) Garcia Lopez DO      isosorbide mononitrate  30  mg Oral QPM Ismael Rivas MD      lactulose  20 g Oral Daily PRN Ismael Rivas MD      levothyroxine  50 mcg Oral Daily Before Breakfast Ismael Rivas MD      [START ON 9/4/2024] losartan  25 mg Oral Daily Kenneth Burgos PA-C      midodrine  5 mg Oral Before Dialysis Ismael Rivas MD      OLANZapine  5 mg Oral HS Ismael Rivas MD      ondansetron  4 mg Intravenous Q6H PRN Ismael Rivas MD      oxyCODONE  10 mg Oral Q4H PRN Ismael Rivas MD      oxyCODONE  5 mg Oral Q4H PRN Ismael Rivas MD      pantoprazole  40 mg Oral Early Morning Ismael Rivas MD      senna-docusate sodium  1 tablet Oral Daily Ismael Rivas MD      sevelamer  1,600 mg Oral TID With Meals Birgit Olguin PA-C          Today, Patient Was Seen By: Jacob Monk MD    ** Please Note: Dictation voice to text software may have been used in the creation of this document. **

## 2024-09-03 NOTE — PLAN OF CARE
Problem: Potential for Falls  Goal: Patient will remain free of falls  Description: INTERVENTIONS:  - Educate patient/family on patient safety including physical limitations  - Instruct patient to call for assistance with activity   - Consult OT/PT to assist with strengthening/mobility   - Keep Call bell within reach  - Keep bed low and locked with side rails adjusted as appropriate  - Keep care items and personal belongings within reach  - Initiate and maintain comfort rounds  - Make Fall Risk Sign visible to staff  - Offer Toileting every 2 Hours, in advance of need  - Initiate/Maintain bed alarm  - Obtain necessary fall risk management equipment: alarm   - Apply yellow socks and bracelet for high fall risk patients  - Consider moving patient to room near nurses station  Outcome: Progressing     Problem: Prexisting or High Potential for Compromised Skin Integrity  Goal: Skin integrity is maintained or improved  Description: INTERVENTIONS:  - Identify patients at risk for skin breakdown  - Assess and monitor skin integrity  - Assess and monitor nutrition and hydration status  - Monitor labs   - Assess for incontinence   - Turn and reposition patient  - Assist with mobility/ambulation  - Relieve pressure over bony prominences  - Avoid friction and shearing  - Provide appropriate hygiene as needed including keeping skin clean and dry  - Evaluate need for skin moisturizer/barrier cream  - Collaborate with interdisciplinary team   - Patient/family teaching  - Consider wound care consult   Outcome: Progressing     Problem: PAIN - ADULT  Goal: Verbalizes/displays adequate comfort level or baseline comfort level  Description: Interventions:  - Encourage patient to monitor pain and request assistance  - Assess pain using appropriate pain scale  - Administer analgesics based on type and severity of pain and evaluate response  - Implement non-pharmacological measures as appropriate and evaluate response  - Consider  cultural and social influences on pain and pain management  - Notify physician/advanced practitioner if interventions unsuccessful or patient reports new pain  Outcome: Progressing     Problem: INFECTION - ADULT  Goal: Absence or prevention of progression during hospitalization  Description: INTERVENTIONS:  - Assess and monitor for signs and symptoms of infection  - Monitor lab/diagnostic results  - Monitor all insertion sites, i.e. indwelling lines, tubes, and drains  - Monitor endotracheal if appropriate and nasal secretions for changes in amount and color  - [place] appropriate cooling/warming therapies per order  - Administer medications as ordered  - Instruct and encourage patient and family to use good hand hygiene technique  - Identify and instruct in appropriate isolation precautions for identified infection/condition  Outcome: Progressing  Goal: Absence of fever/infection during neutropenic period  Description: INTERVENTIONS:  - Monitor WBC    Outcome: Progressing     Problem: SAFETY ADULT  Goal: Patient will remain free of falls  Description: INTERVENTIONS:  - Educate patient/family on patient safety including physical limitations  - Instruct patient to call for assistance with activity   - Consult OT/PT to assist with strengthening/mobility   - Keep Call bell within reach  - Keep bed low and locked with side rails adjusted as appropriate  - Keep care items and personal belongings within reach  - Initiate and maintain comfort rounds  - Make Fall Risk Sign visible to staff  - Offer Toileting every 2 Hours, in advance of need  - Initiate/Maintain bed alarm  - Obtain necessary fall risk management equipment: alar   - Apply yellow socks and bracelet for high fall risk patients  - Consider moving patient to room near nurses station  Outcome: Progressing  Goal: Maintain or return to baseline ADL function  Description: INTERVENTIONS:  -  Assess patient's ability to carry out ADLs; assess patient's baseline for  ADL function and identify physical deficits which impact ability to perform ADLs (bathing, care of mouth/teeth, toileting, grooming, dressing, etc.)  - Assess/evaluate cause of self-care deficits   - Assess range of motion  - Assess patient's mobility; develop plan if impaired  - Assess patient's need for assistive devices and provide as appropriate  - Encourage maximum independence but intervene and supervise when necessary  - Involve family in performance of ADLs  - Assess for home care needs following discharge   - Consider OT consult to assist with ADL evaluation and planning for discharge  - Provide patient education as appropriate  Outcome: Progressing  Goal: Maintains/Returns to pre admission functional level  Description: INTERVENTIONS:  - Perform AM-PAC 6 Click Basic Mobility/ Daily Activity assessment daily.  - Set and communicate daily mobility goal to care team and patient/family/caregiver.   - Collaborate with rehabilitation services on mobility goals if consulted  - Perform Range of Motion  times a day.  - Reposition patient every  hours.  - Dangle patient  times a day  - Stand patient  times a day  - Ambulate patient  times a day  - Out of bed to chair  times a day   - Out of bed for meals times a day  - Out of bed for toileting  - Record patient progress and toleration of activity level   Outcome: Progressing     Problem: DISCHARGE PLANNING  Goal: Discharge to home or other facility with appropriate resources  Description: INTERVENTIONS:  - Identify barriers to discharge w/patient and caregiver  - Arrange for needed discharge resources and transportation as appropriate  - Identify discharge learning needs (meds, wound care, etc.)  - Arrange for interpretive services to assist at discharge as needed  - Refer to Case Management Department for coordinating discharge planning if the patient needs post-hospital services based on physician/advanced practitioner order or complex needs related to functional  status, cognitive ability, or social support system  Outcome: Progressing     Problem: Knowledge Deficit  Goal: Patient/family/caregiver demonstrates understanding of disease process, treatment plan, medications, and discharge instructions  Description: Complete learning assessment and assess knowledge base.  Interventions:  - Provide teaching at level of understanding  - Provide teaching via preferred learning methods  Outcome: Progressing     Problem: CARDIOVASCULAR - ADULT  Goal: Maintains optimal cardiac output and hemodynamic stability  Description: INTERVENTIONS:  - Monitor I/O, vital signs and rhythm  - Monitor for S/S and trends of decreased cardiac output  - Administer and titrate ordered vasoactive medications to optimize hemodynamic stability  - Assess quality of pulses, skin color and temperature  - Assess for signs of decreased coronary artery perfusion  - Instruct patient to report change in severity of symptoms  Outcome: Progressing  Goal: Absence of cardiac dysrhythmias or at baseline rhythm  Description: INTERVENTIONS:  - Continuous cardiac monitoring, vital signs, obtain 12 lead EKG if ordered  - Administer antiarrhythmic and heart rate control medications as ordered  - Monitor electrolytes and administer replacement therapy as ordered  Outcome: Progressing     Problem: RESPIRATORY - ADULT  Goal: Achieves optimal ventilation and oxygenation  Description: INTERVENTIONS:  - Assess for changes in respiratory status  - Assess for changes in mentation and behavior  - Position to facilitate oxygenation and minimize respiratory effort  - Oxygen administered by appropriate delivery if ordered  - Initiate smoking cessation education as indicated  - Encourage broncho-pulmonary hygiene including cough, deep breathe, Incentive Spirometry  - Assess the need for suctioning and aspirate as needed  - Assess and instruct to report SOB or any respiratory difficulty  - Respiratory Therapy support as  indicated  Outcome: Progressing     Problem: GASTROINTESTINAL - ADULT  Goal: Minimal or absence of nausea and/or vomiting  Description: INTERVENTIONS:  - Administer IV fluids if ordered to ensure adequate hydration  - Maintain NPO status until nausea and vomiting are resolved  - Nasogastric tube if ordered  - Administer ordered antiemetic medications as needed  - Provide nonpharmacologic comfort measures as appropriate  - Advance diet as tolerated, if ordered  - Consider nutrition services referral to assist patient with adequate nutrition and appropriate food choices  Outcome: Progressing  Goal: Maintains or returns to baseline bowel function  Description: INTERVENTIONS:  - Assess bowel function  - Encourage oral fluids to ensure adequate hydration  - Administer IV fluids if ordered to ensure adequate hydration  - Administer ordered medications as needed  - Encourage mobilization and activity  - Consider nutritional services referral to assist patient with adequate nutrition and appropriate food choices  Outcome: Progressing  Goal: Maintains adequate nutritional intake  Description: INTERVENTIONS:  - Monitor percentage of each meal consumed  - Identify factors contributing to decreased intake, treat as appropriate  - Assist with meals as needed  - Monitor I&O, weight, and lab values if indicated  - Obtain nutrition services referral as needed  Outcome: Progressing  Goal: Establish and maintain optimal ostomy function  Description: INTERVENTIONS:  - Assess bowel function  - Encourage oral fluids to ensure adequate hydration  - Administer IV fluids if ordered to ensure adequate hydration   - Administer ordered medications as needed  - Encourage mobilization and activity  - Nutrition services referral to assist patient with appropriate food choices  - Assess stoma site  - Consider wound care consult   Outcome: Progressing  Goal: Oral mucous membranes remain intact  Description: INTERVENTIONS  - Assess oral mucosa and  hygiene practices  - Implement preventative oral hygiene regimen  - Implement oral medicated treatments as ordered  - Initiate Nutrition services referral as needed  Outcome: Progressing     Problem: GENITOURINARY - ADULT  Goal: Maintains or returns to baseline urinary function  Description: INTERVENTIONS:  - Assess urinary function  - Encourage oral fluids to ensure adequate hydration if ordered  - Administer IV fluids as ordered to ensure adequate hydration  - Administer ordered medications as needed  - Offer frequent toileting  - Follow urinary retention protocol if ordered  Outcome: Progressing  Goal: Absence of urinary retention  Description: INTERVENTIONS:  - Assess patient’s ability to void and empty bladder  - Monitor I/O  - Bladder scan as needed  - Discuss with physician/AP medications to alleviate retention as needed  - Discuss catheterization for long term situations as appropriate  Outcome: Progressing  Goal: Urinary catheter remains patent  Description: INTERVENTIONS:  - Assess patency of urinary catheter  - If patient has a chronic morales, consider changing catheter if non-functioning  - Follow guidelines for intermittent irrigation of non-functioning urinary catheter  Outcome: Progressing     Problem: METABOLIC, FLUID AND ELECTROLYTES - ADULT  Goal: Electrolytes maintained within normal limits  Description: INTERVENTIONS:  - Monitor labs and assess patient for signs and symptoms of electrolyte imbalances  - Administer electrolyte replacement as ordered  - Monitor response to electrolyte replacements, including repeat lab results as appropriate  - Instruct patient on fluid and nutrition as appropriate  Outcome: Progressing  Goal: Fluid balance maintained  Description: INTERVENTIONS:  - Monitor labs   - Monitor I/O and WT  - Instruct patient on fluid and nutrition as appropriate  - Assess for signs & symptoms of volume excess or deficit  Outcome: Progressing  Goal: Glucose maintained within target  range  Description: INTERVENTIONS:  - Monitor Blood Glucose as ordered  - Assess for signs and symptoms of hyperglycemia and hypoglycemia  - Administer ordered medications to maintain glucose within target range  - Assess nutritional intake and initiate nutrition service referral as needed  Outcome: Progressing     Problem: Nutrition/Hydration-ADULT  Goal: Nutrient/Hydration intake appropriate for improving, restoring or maintaining nutritional needs  Description: Monitor and assess patient's nutrition/hydration status for malnutrition. Collaborate with interdisciplinary team and initiate plan and interventions as ordered.  Monitor patient's weight and dietary intake as ordered or per policy. Utilize nutrition screening tool and intervene as necessary. Determine patient's food preferences and provide high-protein, high-caloric foods as appropriate.     INTERVENTIONS:  - Monitor oral intake, urinary output, labs, and treatment plans  - Assess nutrition and hydration status and recommend course of action  - Evaluate amount of meals eaten  - Assist patient with eating if necessary   - Allow adequate time for meals  - Recommend/ encourage appropriate diets, oral nutritional supplements, and vitamin/mineral supplements  - Order, calculate, and assess calorie counts as needed  - Recommend, monitor, and adjust tube feedings and TPN/PPN based on assessed needs  - Assess need for intravenous fluids  - Provide specific nutrition/hydration education as appropriate  - Include patient/family/caregiver in decisions related to nutrition  Outcome: Progressing

## 2024-09-04 LAB
ANION GAP SERPL CALCULATED.3IONS-SCNC: 11 MMOL/L (ref 4–13)
APTT PPP: 58 SECONDS (ref 23–34)
APTT PPP: >210 SECONDS (ref 23–34)
ATRIAL RATE: 82 BPM
ATRIAL RATE: 83 BPM
BUN SERPL-MCNC: 36 MG/DL (ref 5–25)
CALCIUM SERPL-MCNC: 8.4 MG/DL (ref 8.4–10.2)
CHLORIDE SERPL-SCNC: 97 MMOL/L (ref 96–108)
CO2 SERPL-SCNC: 26 MMOL/L (ref 21–32)
CREAT SERPL-MCNC: 3.98 MG/DL (ref 0.6–1.3)
ERYTHROCYTE [DISTWIDTH] IN BLOOD BY AUTOMATED COUNT: 14.4 % (ref 11.6–15.1)
GFR SERPL CREATININE-BSD FRML MDRD: 11 ML/MIN/1.73SQ M
GLUCOSE SERPL-MCNC: 129 MG/DL (ref 65–140)
GLUCOSE SERPL-MCNC: 135 MG/DL (ref 65–140)
GLUCOSE SERPL-MCNC: 139 MG/DL (ref 65–140)
GLUCOSE SERPL-MCNC: 178 MG/DL (ref 65–140)
GLUCOSE SERPL-MCNC: 214 MG/DL (ref 65–140)
HCT VFR BLD AUTO: 29.8 % (ref 34.8–46.1)
HGB BLD-MCNC: 9.7 G/DL (ref 11.5–15.4)
KCT BLD-ACNC: 274 SEC (ref 89–137)
MCH RBC QN AUTO: 34 PG (ref 26.8–34.3)
MCHC RBC AUTO-ENTMCNC: 32.6 G/DL (ref 31.4–37.4)
MCV RBC AUTO: 105 FL (ref 82–98)
P AXIS: 44 DEGREES
P AXIS: 45 DEGREES
PLATELET # BLD AUTO: 283 THOUSANDS/UL (ref 149–390)
PMV BLD AUTO: 10 FL (ref 8.9–12.7)
POTASSIUM SERPL-SCNC: 5.5 MMOL/L (ref 3.5–5.3)
PR INTERVAL: 150 MS
PR INTERVAL: 156 MS
QRS AXIS: -20 DEGREES
QRS AXIS: -23 DEGREES
QRSD INTERVAL: 160 MS
QRSD INTERVAL: 162 MS
QT INTERVAL: 442 MS
QT INTERVAL: 452 MS
QTC INTERVAL: 519 MS
QTC INTERVAL: 528 MS
RBC # BLD AUTO: 2.85 MILLION/UL (ref 3.81–5.12)
SODIUM SERPL-SCNC: 134 MMOL/L (ref 135–147)
SPECIMEN SOURCE: ABNORMAL
T WAVE AXIS: 129 DEGREES
T WAVE AXIS: 135 DEGREES
VENTRICULAR RATE: 82 BPM
VENTRICULAR RATE: 83 BPM
WBC # BLD AUTO: 10.39 THOUSAND/UL (ref 4.31–10.16)

## 2024-09-04 PROCEDURE — 85347 COAGULATION TIME ACTIVATED: CPT

## 2024-09-04 PROCEDURE — C1725 CATH, TRANSLUMIN NON-LASER: HCPCS | Performed by: INTERNAL MEDICINE

## 2024-09-04 PROCEDURE — C1753 CATH, INTRAVAS ULTRASOUND: HCPCS | Performed by: INTERNAL MEDICINE

## 2024-09-04 PROCEDURE — 85027 COMPLETE CBC AUTOMATED: CPT | Performed by: STUDENT IN AN ORGANIZED HEALTH CARE EDUCATION/TRAINING PROGRAM

## 2024-09-04 PROCEDURE — B2111ZZ FLUOROSCOPY OF MULTIPLE CORONARY ARTERIES USING LOW OSMOLAR CONTRAST: ICD-10-PCS | Performed by: INTERNAL MEDICINE

## 2024-09-04 PROCEDURE — 92928 PRQ TCAT PLMT NTRAC ST 1 LES: CPT | Performed by: INTERNAL MEDICINE

## 2024-09-04 PROCEDURE — C1769 GUIDE WIRE: HCPCS | Performed by: INTERNAL MEDICINE

## 2024-09-04 PROCEDURE — 99152 MOD SED SAME PHYS/QHP 5/>YRS: CPT | Performed by: INTERNAL MEDICINE

## 2024-09-04 PROCEDURE — 027034Z DILATION OF CORONARY ARTERY, ONE ARTERY WITH DRUG-ELUTING INTRALUMINAL DEVICE, PERCUTANEOUS APPROACH: ICD-10-PCS | Performed by: INTERNAL MEDICINE

## 2024-09-04 PROCEDURE — C1887 CATHETER, GUIDING: HCPCS | Performed by: INTERNAL MEDICINE

## 2024-09-04 PROCEDURE — 92978 ENDOLUMINL IVUS OCT C 1ST: CPT | Performed by: INTERNAL MEDICINE

## 2024-09-04 PROCEDURE — 82948 REAGENT STRIP/BLOOD GLUCOSE: CPT

## 2024-09-04 PROCEDURE — 80048 BASIC METABOLIC PNL TOTAL CA: CPT | Performed by: STUDENT IN AN ORGANIZED HEALTH CARE EDUCATION/TRAINING PROGRAM

## 2024-09-04 PROCEDURE — 93010 ELECTROCARDIOGRAM REPORT: CPT | Performed by: INTERNAL MEDICINE

## 2024-09-04 PROCEDURE — 93454 CORONARY ARTERY ANGIO S&I: CPT | Performed by: INTERNAL MEDICINE

## 2024-09-04 PROCEDURE — C1874 STENT, COATED/COV W/DEL SYS: HCPCS | Performed by: INTERNAL MEDICINE

## 2024-09-04 PROCEDURE — 85730 THROMBOPLASTIN TIME PARTIAL: CPT | Performed by: STUDENT IN AN ORGANIZED HEALTH CARE EDUCATION/TRAINING PROGRAM

## 2024-09-04 PROCEDURE — 92979 ENDOLUMINL IVUS OCT C EA: CPT | Performed by: INTERNAL MEDICINE

## 2024-09-04 PROCEDURE — 99232 SBSQ HOSP IP/OBS MODERATE 35: CPT | Performed by: STUDENT IN AN ORGANIZED HEALTH CARE EDUCATION/TRAINING PROGRAM

## 2024-09-04 PROCEDURE — C1894 INTRO/SHEATH, NON-LASER: HCPCS | Performed by: INTERNAL MEDICINE

## 2024-09-04 PROCEDURE — 4A023N7 MEASUREMENT OF CARDIAC SAMPLING AND PRESSURE, LEFT HEART, PERCUTANEOUS APPROACH: ICD-10-PCS | Performed by: INTERNAL MEDICINE

## 2024-09-04 PROCEDURE — 99153 MOD SED SAME PHYS/QHP EA: CPT | Performed by: INTERNAL MEDICINE

## 2024-09-04 PROCEDURE — 99232 SBSQ HOSP IP/OBS MODERATE 35: CPT | Performed by: INTERNAL MEDICINE

## 2024-09-04 PROCEDURE — C9600 PERC DRUG-EL COR STENT SING: HCPCS | Performed by: INTERNAL MEDICINE

## 2024-09-04 PROCEDURE — 93005 ELECTROCARDIOGRAM TRACING: CPT

## 2024-09-04 DEVICE — XIENCE SKYPOINT™ EVEROLIMUS ELUTING CORONARY STENT SYSTEM 3.25 MM X 12 MM / RAPID-EXCHANGE
Type: IMPLANTABLE DEVICE | Status: FUNCTIONAL
Brand: XIENCE SKYPOINT™

## 2024-09-04 RX ORDER — LIDOCAINE HYDROCHLORIDE 10 MG/ML
INJECTION, SOLUTION EPIDURAL; INFILTRATION; INTRACAUDAL; PERINEURAL CODE/TRAUMA/SEDATION MEDICATION
Status: DISCONTINUED | OUTPATIENT
Start: 2024-09-04 | End: 2024-09-04 | Stop reason: HOSPADM

## 2024-09-04 RX ORDER — OXYCODONE AND ACETAMINOPHEN 5; 325 MG/1; MG/1
1 TABLET ORAL EVERY 4 HOURS PRN
Status: DISCONTINUED | OUTPATIENT
Start: 2024-09-04 | End: 2024-09-06 | Stop reason: HOSPADM

## 2024-09-04 RX ORDER — MIDAZOLAM HYDROCHLORIDE 2 MG/2ML
INJECTION, SOLUTION INTRAMUSCULAR; INTRAVENOUS CODE/TRAUMA/SEDATION MEDICATION
Status: DISCONTINUED | OUTPATIENT
Start: 2024-09-04 | End: 2024-09-04 | Stop reason: HOSPADM

## 2024-09-04 RX ORDER — FENTANYL CITRATE 50 UG/ML
INJECTION, SOLUTION INTRAMUSCULAR; INTRAVENOUS CODE/TRAUMA/SEDATION MEDICATION
Status: DISCONTINUED | OUTPATIENT
Start: 2024-09-04 | End: 2024-09-04 | Stop reason: HOSPADM

## 2024-09-04 RX ORDER — VERAPAMIL HYDROCHLORIDE 2.5 MG/ML
INJECTION, SOLUTION INTRAVENOUS CODE/TRAUMA/SEDATION MEDICATION
Status: DISCONTINUED | OUTPATIENT
Start: 2024-09-04 | End: 2024-09-04 | Stop reason: HOSPADM

## 2024-09-04 RX ORDER — HEPARIN SODIUM 1000 [USP'U]/ML
INJECTION, SOLUTION INTRAVENOUS; SUBCUTANEOUS CODE/TRAUMA/SEDATION MEDICATION
Status: DISCONTINUED | OUTPATIENT
Start: 2024-09-04 | End: 2024-09-04 | Stop reason: HOSPADM

## 2024-09-04 RX ORDER — ONDANSETRON 2 MG/ML
INJECTION INTRAMUSCULAR; INTRAVENOUS CODE/TRAUMA/SEDATION MEDICATION
Status: DISCONTINUED | OUTPATIENT
Start: 2024-09-04 | End: 2024-09-04 | Stop reason: HOSPADM

## 2024-09-04 RX ORDER — ACETAMINOPHEN 325 MG/1
650 TABLET ORAL EVERY 4 HOURS PRN
Status: DISCONTINUED | OUTPATIENT
Start: 2024-09-04 | End: 2024-09-06 | Stop reason: HOSPADM

## 2024-09-04 RX ADMIN — NITROGLYCERIN 1 INCH: 20 OINTMENT TOPICAL at 21:47

## 2024-09-04 RX ADMIN — SENNOSIDES AND DOCUSATE SODIUM 1 TABLET: 50; 8.6 TABLET ORAL at 09:13

## 2024-09-04 RX ADMIN — ERGOCALCIFEROL 50000 UNITS: 1.25 CAPSULE ORAL at 09:03

## 2024-09-04 RX ADMIN — INSULIN LISPRO 2 UNITS: 100 INJECTION, SOLUTION INTRAVENOUS; SUBCUTANEOUS at 21:47

## 2024-09-04 RX ADMIN — LOSARTAN POTASSIUM 25 MG: 25 TABLET, FILM COATED ORAL at 09:12

## 2024-09-04 RX ADMIN — INSULIN GLARGINE 20 UNITS: 100 INJECTION, SOLUTION SUBCUTANEOUS at 09:16

## 2024-09-04 RX ADMIN — ALLOPURINOL 200 MG: 100 TABLET ORAL at 09:12

## 2024-09-04 RX ADMIN — ASPIRIN 81 MG: 81 TABLET, COATED ORAL at 09:03

## 2024-09-04 RX ADMIN — OXYCODONE HYDROCHLORIDE 10 MG: 10 TABLET ORAL at 21:47

## 2024-09-04 RX ADMIN — CITALOPRAM HYDROBROMIDE 40 MG: 20 TABLET ORAL at 09:12

## 2024-09-04 RX ADMIN — SEVELAMER HYDROCHLORIDE 1600 MG: 800 TABLET ORAL at 16:30

## 2024-09-04 RX ADMIN — ATORVASTATIN CALCIUM 80 MG: 80 TABLET, FILM COATED ORAL at 09:12

## 2024-09-04 RX ADMIN — DOCUSATE SODIUM 100 MG: 100 CAPSULE, LIQUID FILLED ORAL at 17:16

## 2024-09-04 RX ADMIN — FUROSEMIDE 160 MG: 80 TABLET ORAL at 09:13

## 2024-09-04 RX ADMIN — DOCUSATE SODIUM 100 MG: 100 CAPSULE, LIQUID FILLED ORAL at 09:03

## 2024-09-04 RX ADMIN — LEVOTHYROXINE SODIUM 50 MCG: 50 TABLET ORAL at 09:13

## 2024-09-04 RX ADMIN — HEPARIN SODIUM 2000 UNITS: 1000 INJECTION INTRAVENOUS; SUBCUTANEOUS at 03:42

## 2024-09-04 RX ADMIN — CLOPIDOGREL BISULFATE 75 MG: 75 TABLET ORAL at 09:13

## 2024-09-04 RX ADMIN — OLANZAPINE 5 MG: 5 TABLET, FILM COATED ORAL at 21:47

## 2024-09-04 RX ADMIN — PANTOPRAZOLE SODIUM 40 MG: 40 TABLET, DELAYED RELEASE ORAL at 05:14

## 2024-09-04 NOTE — PROGRESS NOTES
NEPHROLOGY PROGRESS NOTE   Dee Dee Shaffer 63 y.o. female MRN: [id number]  Unit/Bed#: E4 -01 Encounter: [id number]      ASSESSMENT & PLAN:  1 ESRD on HD TTS at [facility].  Dry weight was recently adjusted to 108 kg.  Last dialysis treatment yesterday, tolerated almost 3 L UF.  Next HD tomorrow and continue UF as tolerated    2.  Shortness of breath, volume overload, COVID.  Clinically improving, continue UF during dialysis.  COVID management as per primary team    #3 hypertension with intradialytic hypotension, continue with midodrine pre dialysis, blood pressure improving    4 anemia chronic kidney disease, Mircera and Venofer as an outpatient, monitor H&H and recommend blood transfusion for hemoglobin less than 7 hemoglobin 9.7    #5 mineral bone disease, continue with renal diet and binders with meals    6.  Ischemic cardiomyopathy, elevated troponin with precordial chest pain, history of CAD status post cath and PCI, cardiology on board, plan for cardiac cath this afternoon    #7 Access, left upper extremity AV fistula in place      Recommendations as above were discussed with internal medicine attending, he agreed with the plan, next HD tomorrow    SUBJECTIVE:  Seen and examined, in general feeling better, denies any chest pain or shortness of breath, continue with some mild cough, no abdominal pain, no nausea vomiting      OBJECTIVE:  Current Weight: Weight - Scale: 111 kg (244 lb 11.4 oz)  Vitals:    09/04/24 0911   BP: 129/76   Pulse: 78   Resp: 18   Temp:    SpO2: 96%       Intake/Output Summary (Last 24 hours) at 9/4/2024 1038  Last data filed at 9/3/2024 1215  Gross per 24 hour   Intake 300 ml   Output 3487 ml   Net -3187 ml       General: conscious, cooperative, in not acute distress  Eyes: conjunctivae pink, anicteric sclerae  ENT: lips and mucous membranes moist  Neck: supple, no JVD  Chest: clear breath sounds bilateral, no crackles, ronchus or wheezings  CVS: distinct S1 & S2, normal  rate, regular rhythm  Abdomen: soft, non-tender, non-distended, normoactive bowel sounds  Extremities: no edema of both legs  Skin: no rash  Neuro: awake, alert, oriented        Medications:    Current Facility-Administered Medications:     acetaminophen (TYLENOL) tablet 650 mg, 650 mg, Oral, Q4H PRN, Ismael Rivas MD, 650 mg at 09/02/24 1601    allopurinol (ZYLOPRIM) tablet 200 mg, 200 mg, Oral, Daily, Ismael Rivas MD, 200 mg at 09/04/24 0912    aspirin (ECOTRIN LOW STRENGTH) EC tablet 81 mg, 81 mg, Oral, Daily, Ismael Rivas MD, 81 mg at 09/04/24 0903    atorvastatin (LIPITOR) tablet 80 mg, 80 mg, Oral, Daily, Alexander Scherer DO, 80 mg at 09/04/24 0912    citalopram (CeleXA) tablet 40 mg, 40 mg, Oral, Daily, Ismael Rivas MD, 40 mg at 09/04/24 0912    clopidogrel (PLAVIX) tablet 75 mg, 75 mg, Oral, Daily, Ismael Rivas MD, 75 mg at 09/04/24 0913    docusate sodium (COLACE) capsule 100 mg, 100 mg, Oral, BID, Ismael Rivas MD, 100 mg at 09/04/24 0903    ergocalciferol (VITAMIN D2) capsule 50,000 Units, 50,000 Units, Oral, Once per day on Monday Wednesday Friday, Ismael Rivas MD, 50,000 Units at 09/04/24 0903    furosemide (LASIX) tablet 160 mg, 160 mg, Oral, Daily, Birgit Olguin PA-C, 160 mg at 09/04/24 0913    heparin (porcine) 25,000 units in 0.45% NaCl 250 mL infusion (premix), 3-20 Units/kg/hr (Order-Specific), Intravenous, Titrated, Ismael Rivas MD, Last Rate: 16.3 mL/hr at 09/04/24 0343, 18.1 Units/kg/hr at 09/04/24 0343    heparin (porcine) injection 2,000 Units, 2,000 Units, Intravenous, Q6H PRN, Ismael Rivas MD, 2,000 Units at 09/04/24 0342    heparin (porcine) injection 4,000 Units, 4,000 Units, Intravenous, Q6H PRN, Ismael Rivas MD    insulin glargine (LANTUS) subcutaneous injection 20 Units 0.2 mL, 20 Units, Subcutaneous, QAM, Ismael Rivas MD, 20 Units at 09/04/24 0916    insulin lispro (HumALOG/ADMELOG) 100 units/mL subcutaneous injection 1-6 Units, 1-6 Units, Subcutaneous, 4x Daily (AC &  HS), 2 Units at 09/03/24 2121 **AND** Fingerstick Glucose (POCT), , , 4x Daily AC and at bedtime, Garcia Lopez DO    isosorbide mononitrate (IMDUR) 24 hr tablet 30 mg, 30 mg, Oral, QPM, Ismael Rivas MD, 30 mg at 09/03/24 1737    lactulose (CHRONULAC) oral solution 20 g, 20 g, Oral, Daily PRN, Ismael Rivas MD    levothyroxine tablet 50 mcg, 50 mcg, Oral, Daily Before Breakfast, Ismael Rivas MD, 50 mcg at 09/04/24 0913    losartan (COZAAR) tablet 25 mg, 25 mg, Oral, Daily, Kneneth Burgos PA-C, 25 mg at 09/04/24 0912    midodrine (PROAMATINE) tablet 5 mg, 5 mg, Oral, Before Dialysis, Ismael Rivas MD, 5 mg at 09/03/24 0945    OLANZapine (ZyPREXA) tablet 5 mg, 5 mg, Oral, HS, Ismael Rivas MD, 5 mg at 09/03/24 2120    ondansetron (ZOFRAN) injection 4 mg, 4 mg, Intravenous, Q6H PRN, Ismael Rivas MD    oxyCODONE (ROXICODONE) immediate release tablet 10 mg, 10 mg, Oral, Q4H PRN, Ismael Rivas MD, 10 mg at 09/03/24 1737    oxyCODONE (ROXICODONE) IR tablet 5 mg, 5 mg, Oral, Q4H PRN, Ismael Rivas MD, 5 mg at 09/01/24 1559    pantoprazole (PROTONIX) EC tablet 40 mg, 40 mg, Oral, Early Morning, Ismael Rivas MD, 40 mg at 09/04/24 0514    senna-docusate sodium (SENOKOT S) 8.6-50 mg per tablet 1 tablet, 1 tablet, Oral, Daily, Ismael Rivas MD, 1 tablet at 09/04/24 0913    sevelamer (RENAGEL) tablet 1,600 mg, 1,600 mg, Oral, TID With Meals, Birgit Olguin PA-C, 1,600 mg at 09/03/24 1736    Invasive Devices:        Lab Results:   Results from last 7 days   Lab Units 09/04/24  0228 09/03/24  0512 09/02/24  0434 09/01/24  0551 08/31/24  0545 08/31/24  0023 08/30/24  1528   WBC Thousand/uL 10.39* 8.89 8.08 8.13 7.31   < > 8.02   HEMOGLOBIN g/dL 9.7* 8.8* 8.3* 8.2* 8.3*   < > 9.6*   HEMATOCRIT % 29.8* 26.9* 25.8* 24.6* 24.8*   < > 28.1*   PLATELETS Thousands/uL 283 242 236 221 206   < > 225   SODIUM mmol/L 134* 133* 135 135 132*  --  131*   POTASSIUM mmol/L 5.5* 3.5 3.4* 3.6 3.7  --  3.9   CHLORIDE mmol/L 97 96 98 96 92*  " --  90*   CO2 mmol/L 26 26 26 28 25  --  23   BUN mg/dL 36* 51* 42* 34* 55*  --  50*   CREATININE mg/dL 3.98* 5.00* 4.48* 3.74* 5.19*  --  4.56*   CALCIUM mg/dL 8.4 8.2* 8.0* 7.9* 8.0*  --  8.4   MAGNESIUM mg/dL  --   --   --  2.2 2.7  --   --    PHOSPHORUS mg/dL  --   --   --   --  5.2*  --   --    ALK PHOS U/L  --   --   --   --  85  --  93   ALT U/L  --   --   --   --  140*  --  91*   AST U/L  --   --   --   --  223*  --  188*    < > = values in this interval not displayed.           Portions of the record may have been created with voice recognition software. Occasional wrong word or \"sound a like\" substitutions may have occurred due to the inherent limitations of voice recognition software. Read the chart carefully and recognize, using context, where substitutions have occurred.If you have any questions, please contact the dictating provider.  "

## 2024-09-04 NOTE — PLAN OF CARE
Problem: Potential for Falls  Goal: Patient will remain free of falls  Description: INTERVENTIONS:  - Educate patient/family on patient safety including physical limitations  - Instruct patient to call for assistance with activity   - Consult OT/PT to assist with strengthening/mobility   - Keep Call bell within reach  - Keep bed low and locked with side rails adjusted as appropriate  - Keep care items and personal belongings within reach  - Initiate and maintain comfort rounds  - Make Fall Risk Sign visible to staff  - Offer Toileting every 2 Hours, in advance of need  - Initiate/Maintain bed alarm  - Obtain necessary fall risk management equipment: alarm   - Apply yellow socks and bracelet for high fall risk patients  - Consider moving patient to room near nurses station  Outcome: Progressing     Problem: Prexisting or High Potential for Compromised Skin Integrity  Goal: Skin integrity is maintained or improved  Description: INTERVENTIONS:  - Identify patients at risk for skin breakdown  - Assess and monitor skin integrity  - Assess and monitor nutrition and hydration status  - Monitor labs   - Assess for incontinence   - Turn and reposition patient  - Assist with mobility/ambulation  - Relieve pressure over bony prominences  - Avoid friction and shearing  - Provide appropriate hygiene as needed including keeping skin clean and dry  - Evaluate need for skin moisturizer/barrier cream  - Collaborate with interdisciplinary team   - Patient/family teaching  - Consider wound care consult   Outcome: Progressing     Problem: PAIN - ADULT  Goal: Verbalizes/displays adequate comfort level or baseline comfort level  Description: Interventions:  - Encourage patient to monitor pain and request assistance  - Assess pain using appropriate pain scale  - Administer analgesics based on type and severity of pain and evaluate response  - Implement non-pharmacological measures as appropriate and evaluate response  - Consider  cultural and social influences on pain and pain management  - Notify physician/advanced practitioner if interventions unsuccessful or patient reports new pain  Outcome: Progressing     Problem: INFECTION - ADULT  Goal: Absence or prevention of progression during hospitalization  Description: INTERVENTIONS:  - Assess and monitor for signs and symptoms of infection  - Monitor lab/diagnostic results  - Monitor all insertion sites, i.e. indwelling lines, tubes, and drains  - Monitor endotracheal if appropriate and nasal secretions for changes in amount and color  - [place] appropriate cooling/warming therapies per order  - Administer medications as ordered  - Instruct and encourage patient and family to use good hand hygiene technique  - Identify and instruct in appropriate isolation precautions for identified infection/condition  Outcome: Progressing  Goal: Absence of fever/infection during neutropenic period  Description: INTERVENTIONS:  - Monitor WBC    Outcome: Progressing     Problem: SAFETY ADULT  Goal: Patient will remain free of falls  Description: INTERVENTIONS:  - Educate patient/family on patient safety including physical limitations  - Instruct patient to call for assistance with activity   - Consult OT/PT to assist with strengthening/mobility   - Keep Call bell within reach  - Keep bed low and locked with side rails adjusted as appropriate  - Keep care items and personal belongings within reach  - Initiate and maintain comfort rounds  - Make Fall Risk Sign visible to staff  - Offer Toileting every 2 Hours, in advance of need  - Initiate/Maintain be alarm  - Obtain necessary fall risk management equipment: alarm   - Apply yellow socks and bracelet for high fall risk patients  - Consider moving patient to room near nurses station  Outcome: Progressing  Goal: Maintain or return to baseline ADL function  Description: INTERVENTIONS:  -  Assess patient's ability to carry out ADLs; assess patient's baseline for  ADL function and identify physical deficits which impact ability to perform ADLs (bathing, care of mouth/teeth, toileting, grooming, dressing, etc.)  - Assess/evaluate cause of self-care deficits   - Assess range of motion  - Assess patient's mobility; develop plan if impaired  - Assess patient's need for assistive devices and provide as appropriate  - Encourage maximum independence but intervene and supervise when necessary  - Involve family in performance of ADLs  - Assess for home care needs following discharge   - Consider OT consult to assist with ADL evaluation and planning for discharge  - Provide patient education as appropriate  Outcome: Progressing  Goal: Maintains/Returns to pre admission functional level  Description: INTERVENTIONS:  - Perform AM-PAC 6 Click Basic Mobility/ Daily Activity assessment daily.  - Set and communicate daily mobility goal to care team and patient/family/caregiver.   - Collaborate with rehabilitation services on mobility goals if consulted  - Perform Range of Motion 3 times a day.  - Reposition patient every 2 hours.  - Dangle patient 3 times a day  - Stand patient 3 times a day  - Ambulate patient 3 times a day  - Out of bed to chair 3 times a day   - Out of bed for meals 3 times a day  - Out of bed for toileting  - Record patient progress and toleration of activity level   Outcome: Progressing     Problem: DISCHARGE PLANNING  Goal: Discharge to home or other facility with appropriate resources  Description: INTERVENTIONS:  - Identify barriers to discharge w/patient and caregiver  - Arrange for needed discharge resources and transportation as appropriate  - Identify discharge learning needs (meds, wound care, etc.)  - Arrange for interpretive services to assist at discharge as needed  - Refer to Case Management Department for coordinating discharge planning if the patient needs post-hospital services based on physician/advanced practitioner order or complex needs related to  functional status, cognitive ability, or social support system  Outcome: Progressing     Problem: Knowledge Deficit  Goal: Patient/family/caregiver demonstrates understanding of disease process, treatment plan, medications, and discharge instructions  Description: Complete learning assessment and assess knowledge base.  Interventions:  - Provide teaching at level of understanding  - Provide teaching via preferred learning methods  Outcome: Progressing     Problem: CARDIOVASCULAR - ADULT  Goal: Maintains optimal cardiac output and hemodynamic stability  Description: INTERVENTIONS:  - Monitor I/O, vital signs and rhythm  - Monitor for S/S and trends of decreased cardiac output  - Administer and titrate ordered vasoactive medications to optimize hemodynamic stability  - Assess quality of pulses, skin color and temperature  - Assess for signs of decreased coronary artery perfusion  - Instruct patient to report change in severity of symptoms  Outcome: Progressing  Goal: Absence of cardiac dysrhythmias or at baseline rhythm  Description: INTERVENTIONS:  - Continuous cardiac monitoring, vital signs, obtain 12 lead EKG if ordered  - Administer antiarrhythmic and heart rate control medications as ordered  - Monitor electrolytes and administer replacement therapy as ordered  Outcome: Progressing     Problem: RESPIRATORY - ADULT  Goal: Achieves optimal ventilation and oxygenation  Description: INTERVENTIONS:  - Assess for changes in respiratory status  - Assess for changes in mentation and behavior  - Position to facilitate oxygenation and minimize respiratory effort  - Oxygen administered by appropriate delivery if ordered  - Initiate smoking cessation education as indicated  - Encourage broncho-pulmonary hygiene including cough, deep breathe, Incentive Spirometry  - Assess the need for suctioning and aspirate as needed  - Assess and instruct to report SOB or any respiratory difficulty  - Respiratory Therapy support as  indicated  Outcome: Progressing     Problem: GASTROINTESTINAL - ADULT  Goal: Minimal or absence of nausea and/or vomiting  Description: INTERVENTIONS:  - Administer IV fluids if ordered to ensure adequate hydration  - Maintain NPO status until nausea and vomiting are resolved  - Nasogastric tube if ordered  - Administer ordered antiemetic medications as needed  - Provide nonpharmacologic comfort measures as appropriate  - Advance diet as tolerated, if ordered  - Consider nutrition services referral to assist patient with adequate nutrition and appropriate food choices  Outcome: Progressing  Goal: Maintains or returns to baseline bowel function  Description: INTERVENTIONS:  - Assess bowel function  - Encourage oral fluids to ensure adequate hydration  - Administer IV fluids if ordered to ensure adequate hydration  - Administer ordered medications as needed  - Encourage mobilization and activity  - Consider nutritional services referral to assist patient with adequate nutrition and appropriate food choices  Outcome: Progressing  Goal: Maintains adequate nutritional intake  Description: INTERVENTIONS:  - Monitor percentage of each meal consumed  - Identify factors contributing to decreased intake, treat as appropriate  - Assist with meals as needed  - Monitor I&O, weight, and lab values if indicated  - Obtain nutrition services referral as needed  Outcome: Progressing  Goal: Establish and maintain optimal ostomy function  Description: INTERVENTIONS:  - Assess bowel function  - Encourage oral fluids to ensure adequate hydration  - Administer IV fluids if ordered to ensure adequate hydration   - Administer ordered medications as needed  - Encourage mobilization and activity  - Nutrition services referral to assist patient with appropriate food choices  - Assess stoma site  - Consider wound care consult   Outcome: Progressing  Goal: Oral mucous membranes remain intact  Description: INTERVENTIONS  - Assess oral mucosa and  hygiene practices  - Implement preventative oral hygiene regimen  - Implement oral medicated treatments as ordered  - Initiate Nutrition services referral as needed  Outcome: Progressing     Problem: GENITOURINARY - ADULT  Goal: Maintains or returns to baseline urinary function  Description: INTERVENTIONS:  - Assess urinary function  - Encourage oral fluids to ensure adequate hydration if ordered  - Administer IV fluids as ordered to ensure adequate hydration  - Administer ordered medications as needed  - Offer frequent toileting  - Follow urinary retention protocol if ordered  Outcome: Progressing  Goal: Absence of urinary retention  Description: INTERVENTIONS:  - Assess patient’s ability to void and empty bladder  - Monitor I/O  - Bladder scan as needed  - Discuss with physician/AP medications to alleviate retention as needed  - Discuss catheterization for long term situations as appropriate  Outcome: Progressing  Goal: Urinary catheter remains patent  Description: INTERVENTIONS:  - Assess patency of urinary catheter  - If patient has a chronic morales, consider changing catheter if non-functioning  - Follow guidelines for intermittent irrigation of non-functioning urinary catheter  Outcome: Progressing     Problem: METABOLIC, FLUID AND ELECTROLYTES - ADULT  Goal: Electrolytes maintained within normal limits  Description: INTERVENTIONS:  - Monitor labs and assess patient for signs and symptoms of electrolyte imbalances  - Administer electrolyte replacement as ordered  - Monitor response to electrolyte replacements, including repeat lab results as appropriate  - Instruct patient on fluid and nutrition as appropriate  Outcome: Progressing  Goal: Fluid balance maintained  Description: INTERVENTIONS:  - Monitor labs   - Monitor I/O and WT  - Instruct patient on fluid and nutrition as appropriate  - Assess for signs & symptoms of volume excess or deficit  Outcome: Progressing  Goal: Glucose maintained within target  range  Description: INTERVENTIONS:  - Monitor Blood Glucose as ordered  - Assess for signs and symptoms of hyperglycemia and hypoglycemia  - Administer ordered medications to maintain glucose within target range  - Assess nutritional intake and initiate nutrition service referral as needed  Outcome: Progressing     Problem: Nutrition/Hydration-ADULT  Goal: Nutrient/Hydration intake appropriate for improving, restoring or maintaining nutritional needs  Description: Monitor and assess patient's nutrition/hydration status for malnutrition. Collaborate with interdisciplinary team and initiate plan and interventions as ordered.  Monitor patient's weight and dietary intake as ordered or per policy. Utilize nutrition screening tool and intervene as necessary. Determine patient's food preferences and provide high-protein, high-caloric foods as appropriate.     INTERVENTIONS:  - Monitor oral intake, urinary output, labs, and treatment plans  - Assess nutrition and hydration status and recommend course of action  - Evaluate amount of meals eaten  - Assist patient with eating if necessary   - Allow adequate time for meals  - Recommend/ encourage appropriate diets, oral nutritional supplements, and vitamin/mineral supplements  - Order, calculate, and assess calorie counts as needed  - Recommend, monitor, and adjust tube feedings and TPN/PPN based on assessed needs  - Assess need for intravenous fluids  - Provide specific nutrition/hydration education as appropriate  - Include patient/family/caregiver in decisions related to nutrition  Outcome: Progressing

## 2024-09-04 NOTE — ASSESSMENT & PLAN NOTE
Wt Readings from Last 3 Encounters:   09/04/24 111 kg (244 lb 11.4 oz)   08/19/24 108 kg (237 lb)   08/02/24 109 kg (240 lb)     Patient reports generalized weakness.  Suspect volume overload secondary to increased fluid intake  Continue Lasix 160mg po  Continue volume removal with hemodialysis  Patient symptoms appear to improve, currently on room air.  Denies any shortness of breath

## 2024-09-04 NOTE — PLAN OF CARE
Problem: Potential for Falls  Goal: Patient will remain free of falls  Description: INTERVENTIONS:  - Educate patient/family on patient safety including physical limitations  - Instruct patient to call for assistance with activity   - Consult OT/PT to assist with strengthening/mobility   - Keep Call bell within reach  - Keep bed low and locked with side rails adjusted as appropriate  - Keep care items and personal belongings within reach  - Initiate and maintain comfort rounds  - Make Fall Risk Sign visible to staff  - Offer Toileting every 2 Hours, in advance of need  - Initiate/Maintain bed alarm  - Obtain necessary fall risk management equipment:   - Apply yellow socks and bracelet for high fall risk patients  - Consider moving patient to room near nurses station  9/4/2024 0224 by Rhianna Mcpherson RN  Outcome: Progressing  9/4/2024 0224 by Rhianna Mcpherson RN  Outcome: Progressing     Problem: Prexisting or High Potential for Compromised Skin Integrity  Goal: Skin integrity is maintained or improved  Description: INTERVENTIONS:  - Identify patients at risk for skin breakdown  - Assess and monitor skin integrity  - Assess and monitor nutrition and hydration status  - Monitor labs   - Assess for incontinence   - Turn and reposition patient  - Assist with mobility/ambulation  - Relieve pressure over bony prominences  - Avoid friction and shearing  - Provide appropriate hygiene as needed including keeping skin clean and dry  - Evaluate need for skin moisturizer/barrier cream  - Collaborate with interdisciplinary team   - Patient/family teaching  - Consider wound care consult   9/4/2024 0224 by Rhianna Mcpherson RN  Outcome: Progressing  9/4/2024 0224 by Rhianna Mcpherson RN  Outcome: Progressing     Problem: PAIN - ADULT  Goal: Verbalizes/displays adequate comfort level or baseline comfort level  Description: Interventions:  - Encourage patient to monitor pain and request assistance  - Assess pain using appropriate pain  scale  - Administer analgesics based on type and severity of pain and evaluate response  - Implement non-pharmacological measures as appropriate and evaluate response  - Consider cultural and social influences on pain and pain management  - Notify physician/advanced practitioner if interventions unsuccessful or patient reports new pain  9/4/2024 0224 by Rhianna Mcpherson RN  Outcome: Progressing  9/4/2024 0224 by Rhianna Mcpherson RN  Outcome: Progressing     Problem: INFECTION - ADULT  Goal: Absence or prevention of progression during hospitalization  Description: INTERVENTIONS:  - Assess and monitor for signs and symptoms of infection  - Monitor lab/diagnostic results  - Monitor all insertion sites, i.e. indwelling lines, tubes, and drains  - Monitor endotracheal if appropriate and nasal secretions for changes in amount and color  - [place] appropriate cooling/warming therapies per order  - Administer medications as ordered  - Instruct and encourage patient and family to use good hand hygiene technique  - Identify and instruct in appropriate isolation precautions for identified infection/condition  9/4/2024 0224 by Rhianna Mcpherson RN  Outcome: Progressing  9/4/2024 0224 by Rhianna Mcpherson RN  Outcome: Progressing  Goal: Absence of fever/infection during neutropenic period  Description: INTERVENTIONS:  - Monitor WBC    9/4/2024 0224 by Rhianna Mcpherson RN  Outcome: Progressing  9/4/2024 0224 by Rhianna Mcpherson RN  Outcome: Progressing     Problem: SAFETY ADULT  Goal: Patient will remain free of falls  Description: INTERVENTIONS:  - Educate patient/family on patient safety including physical limitations  - Instruct patient to call for assistance with activity   - Consult OT/PT to assist with strengthening/mobility   - Keep Call bell within reach  - Keep bed low and locked with side rails adjusted as appropriate  - Keep care items and personal belongings within reach  - Initiate and maintain comfort rounds  - Make Fall Risk  Sign visible to staff  - Offer Toileting every 2 Hours, in advance of need  - Initiate/Maintain bed alarm  - Obtain necessary fall risk management equipment:   - Apply yellow socks and bracelet for high fall risk patients  - Consider moving patient to room near nurses station  9/4/2024 0224 by Rhianna Mcpherson RN  Outcome: Progressing  9/4/2024 0224 by Rhianna Mcpherson RN  Outcome: Progressing  Goal: Maintain or return to baseline ADL function  Description: INTERVENTIONS:  -  Assess patient's ability to carry out ADLs; assess patient's baseline for ADL function and identify physical deficits which impact ability to perform ADLs (bathing, care of mouth/teeth, toileting, grooming, dressing, etc.)  - Assess/evaluate cause of self-care deficits   - Assess range of motion  - Assess patient's mobility; develop plan if impaired  - Assess patient's need for assistive devices and provide as appropriate  - Encourage maximum independence but intervene and supervise when necessary  - Involve family in performance of ADLs  - Assess for home care needs following discharge   - Consider OT consult to assist with ADL evaluation and planning for discharge  - Provide patient education as appropriate  9/4/2024 0224 by Rhianna Mcpherson RN  Outcome: Progressing  9/4/2024 0224 by Rhianna Mcpherson RN  Outcome: Progressing  Goal: Maintains/Returns to pre admission functional level  Description: INTERVENTIONS:  - Perform AM-PAC 6 Click Basic Mobility/ Daily Activity assessment daily.  - Set and communicate daily mobility goal to care team and patient/family/caregiver.   - Collaborate with rehabilitation services on mobility goals if consulted  - Perform Range of Motion 3 times a day.  - Reposition patient every 2 hours.  - Dangle patient 3 times a day  - Stand patient 3 times a day  - Ambulate patient 3 times a day  - Out of bed to chair 3 times a day   - Out of bed for meals 3 times a day  - Out of bed for toileting  - Record patient progress and  toleration of activity level   9/4/2024 0224 by Rhianna Mcpherson RN  Outcome: Progressing  9/4/2024 0224 by Rhianna Mcpherson RN  Outcome: Progressing     Problem: DISCHARGE PLANNING  Goal: Discharge to home or other facility with appropriate resources  Description: INTERVENTIONS:  - Identify barriers to discharge w/patient and caregiver  - Arrange for needed discharge resources and transportation as appropriate  - Identify discharge learning needs (meds, wound care, etc.)  - Arrange for interpretive services to assist at discharge as needed  - Refer to Case Management Department for coordinating discharge planning if the patient needs post-hospital services based on physician/advanced practitioner order or complex needs related to functional status, cognitive ability, or social support system  9/4/2024 0224 by Rhianna Mcpherson RN  Outcome: Progressing  9/4/2024 0224 by Rhianna Mcpherson RN  Outcome: Progressing     Problem: Knowledge Deficit  Goal: Patient/family/caregiver demonstrates understanding of disease process, treatment plan, medications, and discharge instructions  Description: Complete learning assessment and assess knowledge base.  Interventions:  - Provide teaching at level of understanding  - Provide teaching via preferred learning methods  9/4/2024 0224 by Rhianna Mcpherson RN  Outcome: Progressing  9/4/2024 0224 by Rhianna Mcpherson RN  Outcome: Progressing     Problem: CARDIOVASCULAR - ADULT  Goal: Maintains optimal cardiac output and hemodynamic stability  Description: INTERVENTIONS:  - Monitor I/O, vital signs and rhythm  - Monitor for S/S and trends of decreased cardiac output  - Administer and titrate ordered vasoactive medications to optimize hemodynamic stability  - Assess quality of pulses, skin color and temperature  - Assess for signs of decreased coronary artery perfusion  - Instruct patient to report change in severity of symptoms  9/4/2024 0224 by Rhianna Mcpherson RN  Outcome: Progressing  9/4/2024 0224  by Rhianna Mcpherson RN  Outcome: Progressing  Goal: Absence of cardiac dysrhythmias or at baseline rhythm  Description: INTERVENTIONS:  - Continuous cardiac monitoring, vital signs, obtain 12 lead EKG if ordered  - Administer antiarrhythmic and heart rate control medications as ordered  - Monitor electrolytes and administer replacement therapy as ordered  9/4/2024 0224 by Rhianna Mcpherson RN  Outcome: Progressing  9/4/2024 0224 by Rhianna Mcpherson RN  Outcome: Progressing     Problem: RESPIRATORY - ADULT  Goal: Achieves optimal ventilation and oxygenation  Description: INTERVENTIONS:  - Assess for changes in respiratory status  - Assess for changes in mentation and behavior  - Position to facilitate oxygenation and minimize respiratory effort  - Oxygen administered by appropriate delivery if ordered  - Initiate smoking cessation education as indicated  - Encourage broncho-pulmonary hygiene including cough, deep breathe, Incentive Spirometry  - Assess the need for suctioning and aspirate as needed  - Assess and instruct to report SOB or any respiratory difficulty  - Respiratory Therapy support as indicated  9/4/2024 0224 by Rhianna Mcpherson RN  Outcome: Progressing  9/4/2024 0224 by Rhianna Mcpherson RN  Outcome: Progressing     Problem: GASTROINTESTINAL - ADULT  Goal: Minimal or absence of nausea and/or vomiting  Description: INTERVENTIONS:  - Administer IV fluids if ordered to ensure adequate hydration  - Maintain NPO status until nausea and vomiting are resolved  - Nasogastric tube if ordered  - Administer ordered antiemetic medications as needed  - Provide nonpharmacologic comfort measures as appropriate  - Advance diet as tolerated, if ordered  - Consider nutrition services referral to assist patient with adequate nutrition and appropriate food choices  9/4/2024 0224 by Rhianna Mcpherson RN  Outcome: Progressing  9/4/2024 0224 by Rhianna Mcpherson RN  Outcome: Progressing  Goal: Maintains or returns to baseline bowel  function  Description: INTERVENTIONS:  - Assess bowel function  - Encourage oral fluids to ensure adequate hydration  - Administer IV fluids if ordered to ensure adequate hydration  - Administer ordered medications as needed  - Encourage mobilization and activity  - Consider nutritional services referral to assist patient with adequate nutrition and appropriate food choices  9/4/2024 0224 by Rhianna Mcpherson RN  Outcome: Progressing  9/4/2024 0224 by Rhianna Mcpherson RN  Outcome: Progressing  Goal: Maintains adequate nutritional intake  Description: INTERVENTIONS:  - Monitor percentage of each meal consumed  - Identify factors contributing to decreased intake, treat as appropriate  - Assist with meals as needed  - Monitor I&O, weight, and lab values if indicated  - Obtain nutrition services referral as needed  9/4/2024 0224 by Rhianna Mcpherson RN  Outcome: Progressing  9/4/2024 0224 by Rhianna Mcpherson RN  Outcome: Progressing  Goal: Establish and maintain optimal ostomy function  Description: INTERVENTIONS:  - Assess bowel function  - Encourage oral fluids to ensure adequate hydration  - Administer IV fluids if ordered to ensure adequate hydration   - Administer ordered medications as needed  - Encourage mobilization and activity  - Nutrition services referral to assist patient with appropriate food choices  - Assess stoma site  - Consider wound care consult   9/4/2024 0224 by Rhianna Mcpherson RN  Outcome: Progressing  9/4/2024 0224 by Rhianna Mcpherson RN  Outcome: Progressing  Goal: Oral mucous membranes remain intact  Description: INTERVENTIONS  - Assess oral mucosa and hygiene practices  - Implement preventative oral hygiene regimen  - Implement oral medicated treatments as ordered  - Initiate Nutrition services referral as needed  9/4/2024 0224 by Rhianna Mcpherson RN  Outcome: Progressing  9/4/2024 0224 by Rhianna Mcpherson RN  Outcome: Progressing     Problem: GENITOURINARY - ADULT  Goal: Maintains or returns to baseline  urinary function  Description: INTERVENTIONS:  - Assess urinary function  - Encourage oral fluids to ensure adequate hydration if ordered  - Administer IV fluids as ordered to ensure adequate hydration  - Administer ordered medications as needed  - Offer frequent toileting  - Follow urinary retention protocol if ordered  9/4/2024 0224 by Rhianna Mcpherson RN  Outcome: Progressing  9/4/2024 0224 by Rhianna Mcpherson RN  Outcome: Progressing  Goal: Absence of urinary retention  Description: INTERVENTIONS:  - Assess patient’s ability to void and empty bladder  - Monitor I/O  - Bladder scan as needed  - Discuss with physician/AP medications to alleviate retention as needed  - Discuss catheterization for long term situations as appropriate  9/4/2024 0224 by Rhianna Mcpherson RN  Outcome: Progressing  9/4/2024 0224 by Rhianna Mcpherson RN  Outcome: Progressing  Goal: Urinary catheter remains patent  Description: INTERVENTIONS:  - Assess patency of urinary catheter  - If patient has a chronic morales, consider changing catheter if non-functioning  - Follow guidelines for intermittent irrigation of non-functioning urinary catheter  9/4/2024 0224 by Rhianna Mcpherson RN  Outcome: Progressing  9/4/2024 0224 by Rhianna Mcpherson RN  Outcome: Progressing     Problem: METABOLIC, FLUID AND ELECTROLYTES - ADULT  Goal: Electrolytes maintained within normal limits  Description: INTERVENTIONS:  - Monitor labs and assess patient for signs and symptoms of electrolyte imbalances  - Administer electrolyte replacement as ordered  - Monitor response to electrolyte replacements, including repeat lab results as appropriate  - Instruct patient on fluid and nutrition as appropriate  9/4/2024 0224 by Rhianna Mcpherson RN  Outcome: Progressing  9/4/2024 0224 by Rhianna Mcpherson RN  Outcome: Progressing  Goal: Fluid balance maintained  Description: INTERVENTIONS:  - Monitor labs   - Monitor I/O and WT  - Instruct patient on fluid and nutrition as appropriate  - Assess  for signs & symptoms of volume excess or deficit  9/4/2024 0224 by Rhianna Mcpherson RN  Outcome: Progressing  9/4/2024 0224 by Rhianna Mcpherson RN  Outcome: Progressing  Goal: Glucose maintained within target range  Description: INTERVENTIONS:  - Monitor Blood Glucose as ordered  - Assess for signs and symptoms of hyperglycemia and hypoglycemia  - Administer ordered medications to maintain glucose within target range  - Assess nutritional intake and initiate nutrition service referral as needed  9/4/2024 0224 by Rhianna Mcpherson RN  Outcome: Progressing  9/4/2024 0224 by Rhianna Mcpherson RN  Outcome: Progressing     Problem: Nutrition/Hydration-ADULT  Goal: Nutrient/Hydration intake appropriate for improving, restoring or maintaining nutritional needs  Description: Monitor and assess patient's nutrition/hydration status for malnutrition. Collaborate with interdisciplinary team and initiate plan and interventions as ordered.  Monitor patient's weight and dietary intake as ordered or per policy. Utilize nutrition screening tool and intervene as necessary. Determine patient's food preferences and provide high-protein, high-caloric foods as appropriate.     INTERVENTIONS:  - Monitor oral intake, urinary output, labs, and treatment plans  - Assess nutrition and hydration status and recommend course of action  - Evaluate amount of meals eaten  - Assist patient with eating if necessary   - Allow adequate time for meals  - Recommend/ encourage appropriate diets, oral nutritional supplements, and vitamin/mineral supplements  - Order, calculate, and assess calorie counts as needed  - Recommend, monitor, and adjust tube feedings and TPN/PPN based on assessed needs  - Assess need for intravenous fluids  - Provide specific nutrition/hydration education as appropriate  - Include patient/family/caregiver in decisions related to nutrition  9/4/2024 0224 by Rhianna Mcpherson RN  Outcome: Progressing  9/4/2024 0224 by Rhianna Mcpherson  RN  Outcome: Progressing

## 2024-09-04 NOTE — OCCUPATIONAL THERAPY NOTE
Occupational Therapy Cancel Note:    Patient Name: Dee Dee Shaffer  Today's Date: 9/4/2024    Chart reviewed. Pt off floor at Cardiac cath lab. Will cx and complete OT session at later time as schedule permits.    Mechelle Pimentel, OTR/L

## 2024-09-04 NOTE — ASSESSMENT & PLAN NOTE
History of coronary artery disease with multiple coronary artery stents  Continue medical management with Plavix, statin, heparin drip, Imdur  2D echo showing EF of 23% with severe global hypokinesis, regional variation wall motion abnormality  Pending cardiac cath, currently n.p.o.

## 2024-09-04 NOTE — PROGRESS NOTES
[facility]  Progress Note  Name: Dee Dee Shaffer I  MRN: [id number]  Unit/Bed#: E4 -01 I Date of Admission: 8/30/2024   Date of Service: 9/4/2024 I Hospital Day: 5    Assessment & Plan   Bacteriuria  Assessment & Plan  History of ESRD nonoliguric, with chronic Camargo catheter  UA did grow Pseudomonas Enterobacter, suspect likely colonization without any urinary symptoms  Monitor off antibiotics    COVID-19  Assessment & Plan  Suspect this is contributing to admitting symptoms, possibly complicated by myocarditis  Completed 3 days of remdesivir  Currently improving, on room air    Hypertension  Assessment & Plan  Continue home BP meds    Chronic obstructive pulmonary disease, unspecified COPD type ([facility])  Assessment & Plan  Not currently in exacerbation    Troponin level elevated  Assessment & Plan  Significantly elevated troponin without chest pain  Suspect type II non-MI related troponin elevation in setting of acute heart failure versus ACS versus myocarditis  Continue heparin drip, aspirin, statin, Imdur  Cath today    Coronary artery disease with stable angina pectoris ([facility])  Assessment & Plan  History of coronary artery disease with multiple coronary artery stents  Continue medical management with Plavix, statin, heparin drip, Imdur  2D echo showing EF of 23% with severe global hypokinesis, regional variation wall motion abnormality  Pending cardiac cath, currently n.p.o.    Mixed hyperlipidemia  Assessment & Plan  Continue statin therapy    Anemia due to chronic kidney disease, on chronic dialysis ([facility])  Assessment & Plan  Continue to monitor, transfuse as needed    Neurogenic bladder  Assessment & Plan  Chronic Camargo catheter    ESRD (end stage renal disease) ([facility])  Assessment & Plan  Lab Results   Component Value Date    EGFR 11 09/04/2024    EGFR 8 09/03/2024    EGFR 9 09/02/2024    CREATININE 3.98 (H) 09/04/2024    CREATININE 5.00 (H) 09/03/2024    CREATININE 4.48 (H)  2024     Appreciate nephrology recommendations    Type 2 diabetes mellitus with chronic kidney disease on chronic dialysis, with long-term current use of insulin (HCC)  Assessment & Plan  Continue basal bolus regimen plus sliding scale insulin      * Acute decompensated heart failure (HCC)  Assessment & Plan  Wt Readings from Last 3 Encounters:   24 111 kg (244 lb 11.4 oz)   24 108 kg (237 lb)   24 109 kg (240 lb)     Patient reports generalized weakness.  Suspect volume overload secondary to increased fluid intake  Continue Lasix 160mg po  Continue volume removal with hemodialysis  Patient symptoms appear to improve, currently on room air.  Denies any shortness of breath             VTE Pharmacologic Prophylaxis:   Pharmacologic: Heparin Drip  Mechanical VTE Prophylaxis in Place: Yes    Current Length of Stay: 5 day(s)    Current Patient Status: Inpatient   Certification Statement: The patient will continue to require additional inpatient hospital stay due to pending cardiac cath    Discharge Plan: 24 to 48 hours    Code Status: Level 1 - Full Code      Subjective:   Patient seen at bedside this morning.  Reports doing well with no complaints.  Denies any chest pain or shortness of breath.    Objective:     Vitals:   Temp (24hrs), Av.4 °F (36.3 °C), Min:97.1 °F (36.2 °C), Max:97.7 °F (36.5 °C)    Temp:  [97.1 °F (36.2 °C)-97.7 °F (36.5 °C)] 97.1 °F (36.2 °C)  HR:  [72-78] 72  Resp:  [16-18] 18  BP: (129-140)/(69-78) 140/78  SpO2:  [96 %-100 %] 98 %  Body mass index is 38.33 kg/m².     Input and Output Summary (last 24 hours):     No intake or output data in the 24 hours ending 24 1219    Physical Exam:     Physical Exam  Vitals and nursing note reviewed.   Constitutional:       Appearance: She is obese.   HENT:      Head: Normocephalic.   Eyes:      Conjunctiva/sclera: Conjunctivae normal.   Cardiovascular:      Rate and Rhythm: Normal rate.   Pulmonary:      Effort: Pulmonary  effort is normal. No respiratory distress.   Abdominal:      General: Bowel sounds are normal. There is no distension.      Palpations: Abdomen is soft.   Musculoskeletal:         General: No swelling.      Right lower leg: No edema.      Left lower leg: No edema.   Skin:     General: Skin is warm and dry.   Neurological:      General: No focal deficit present.      Mental Status: She is alert. Mental status is at baseline.         Additional Data:     Labs:    Results from last 7 days   Lab Units 09/04/24 0228 09/03/24  0512   WBC Thousand/uL 10.39* 8.89   HEMOGLOBIN g/dL 9.7* 8.8*   HEMATOCRIT % 29.8* 26.9*   PLATELETS Thousands/uL 283 242   SEGS PCT %  --  68   LYMPHO PCT %  --  23   MONO PCT %  --  6   EOS PCT %  --  2     Results from last 7 days   Lab Units 09/04/24 0228 09/01/24  0551 08/31/24  0545   SODIUM mmol/L 134*   < > 132*   POTASSIUM mmol/L 5.5*   < > 3.7   CHLORIDE mmol/L 97   < > 92*   CO2 mmol/L 26   < > 25   BUN mg/dL 36*   < > 55*   CREATININE mg/dL 3.98*   < > 5.19*   ANION GAP mmol/L 11   < > 15*   CALCIUM mg/dL 8.4   < > 8.0*   ALBUMIN g/dL  --   --  3.5   TOTAL BILIRUBIN mg/dL  --   --  0.56   ALK PHOS U/L  --   --  85   ALT U/L  --   --  140*   AST U/L  --   --  223*   GLUCOSE RANDOM mg/dL 178*   < > 170*    < > = values in this interval not displayed.     Results from last 7 days   Lab Units 08/30/24  2236   INR  1.37*     Results from last 7 days   Lab Units 09/04/24  1138 09/04/24  0910 09/03/24  2036 09/03/24  1651 09/03/24  1205 09/03/24  0910 09/02/24  2127 09/02/24  1543 09/02/24  1141 09/02/24  0835 09/01/24  2044 09/01/24  1623   POC GLUCOSE mg/dl 135 139 210* 215* 181* 184* 224* 275* 220* 140 163* 158*     Results from last 7 days   Lab Units 08/31/24  1412   HEMOGLOBIN A1C % 6.5*               * I Have Reviewed All Lab Data Listed Above.  * Additional Pertinent Lab Tests Reviewed: All Labs For Current Hospital Admission Reviewed    Mobility:  Basic Mobility Inpatient Raw Score:  21  -HLM Goal: 6: Walk 10 steps or more  JH-HLM Achieved: 6: Walk 10 steps or more    Lines:     Invasive Devices       Peripheral Intravenous Line  Duration             Peripheral IV 08/30/24 Right Antecubital 4 days    Peripheral IV 09/04/24 Right;Upper;Ventral (anterior) Arm <1 day              Line  Duration             Hemodialysis Access 02/06/23 Left Upper arm 576 days              Drain  Duration             Suprapubic Catheter 16 Fr. 33 days                       Imaging:    Imaging Reports Reviewed Today Include:     XR chest 2 views    Result Date: 8/30/2024  Impression: Small bilateral pleural effusions and mild to moderate pulmonary interstitial edema. Workstation performed: [id number]        Recent Cultures (last 7 days):     Results from last 7 days   Lab Units 08/30/24  1854   URINE CULTURE  >100,000 cfu/ml Enterobacter cloacae*  >100,000 cfu/ml Pseudomonas aeruginosa*       Last 24 Hours Medication List:   Current Facility-Administered Medications   Medication Dose Route Frequency Provider Last Rate    acetaminophen  650 mg Oral Q4H PRN Ismael Rivas MD      allopurinol  200 mg Oral Daily Ismael Rivas MD      aspirin  81 mg Oral Daily Ismael Rivas MD      atorvastatin  80 mg Oral Daily Alexander Scherer DO      citalopram  40 mg Oral Daily Ismael Rivas MD      clopidogrel  75 mg Oral Daily Ismael Rivas MD      docusate sodium  100 mg Oral BID Ismael Rivas MD      ergocalciferol  50,000 Units Oral Once per day on Monday Wednesday Friday Ismael Rivas MD      furosemide  160 mg Oral Daily Birgit Olguin PA-C      heparin (porcine)  3-20 Units/kg/hr (Order-Specific) Intravenous Titrated Ismael Rivas MD 18.1 Units/kg/hr (09/04/24 0343)    heparin (porcine)  2,000 Units Intravenous Q6H PRN Ismael Rivas MD      heparin (porcine)  4,000 Units Intravenous Q6H PRN Ismael Rivas MD      insulin glargine  20 Units Subcutaneous QAM Ismael Rivas MD      insulin lispro  1-6 Units Subcutaneous 4x  Daily (AC & HS) Garcia Lopez DO      isosorbide mononitrate  30 mg Oral QPM Ismael Rivas MD      lactulose  20 g Oral Daily PRN Ismael Rivas MD      levothyroxine  50 mcg Oral Daily Before Breakfast Ismael Rivas MD      losartan  25 mg Oral Daily Kenneth Burgos PA-C      midodrine  5 mg Oral Before Dialysis Ismael Rivas MD      OLANZapine  5 mg Oral HS Ismael Rivas MD      ondansetron  4 mg Intravenous Q6H PRN Ismael Rivas MD      oxyCODONE  10 mg Oral Q4H PRN Ismael Rivas MD      oxyCODONE  5 mg Oral Q4H PRN Ismael Rivas MD      pantoprazole  40 mg Oral Early Morning Ismael Rivas MD      senna-docusate sodium  1 tablet Oral Daily Ismael Rivas MD      sevelamer  1,600 mg Oral TID With Meals Birgit Olguin PA-C          Today, Patient Was Seen By: Jacob Monk MD    ** Please Note: Dictation voice to text software may have been used in the creation of this document. **

## 2024-09-04 NOTE — ASSESSMENT & PLAN NOTE
Lab Results   Component Value Date    EGFR 11 09/04/2024    EGFR 8 09/03/2024    EGFR 9 09/02/2024    CREATININE 3.98 (H) 09/04/2024    CREATININE 5.00 (H) 09/03/2024    CREATININE 4.48 (H) 09/02/2024     [facility] nephrology recommendations

## 2024-09-04 NOTE — ASSESSMENT & PLAN NOTE
Significantly elevated troponin without chest pain  Suspect type II non-MI related troponin elevation in setting of acute heart failure versus ACS versus myocarditis  Continue heparin drip, aspirin, statin, Imdur  Cath today

## 2024-09-05 ENCOUNTER — APPOINTMENT (INPATIENT)
Dept: DIALYSIS | Facility: HOSPITAL | Age: 64
DRG: 981 | End: 2024-09-05
Attending: INTERNAL MEDICINE
Payer: COMMERCIAL

## 2024-09-05 LAB
AMORPH URATE CRY URNS QL MICRO: ABNORMAL
ANION GAP SERPL CALCULATED.3IONS-SCNC: 7 MMOL/L (ref 4–13)
BACTERIA UR QL AUTO: ABNORMAL /HPF
BILIRUB UR QL STRIP: NEGATIVE
BUN SERPL-MCNC: 40 MG/DL (ref 5–25)
CALCIUM SERPL-MCNC: 8.5 MG/DL (ref 8.4–10.2)
CHLORIDE SERPL-SCNC: 98 MMOL/L (ref 96–108)
CLARITY UR: ABNORMAL
CO2 SERPL-SCNC: 27 MMOL/L (ref 21–32)
COLOR UR: ABNORMAL
CREAT SERPL-MCNC: 4.76 MG/DL (ref 0.6–1.3)
ERYTHROCYTE [DISTWIDTH] IN BLOOD BY AUTOMATED COUNT: 15.1 % (ref 11.6–15.1)
GFR SERPL CREATININE-BSD FRML MDRD: 9 ML/MIN/1.73SQ M
GLUCOSE SERPL-MCNC: 102 MG/DL (ref 65–140)
GLUCOSE SERPL-MCNC: 142 MG/DL (ref 65–140)
GLUCOSE SERPL-MCNC: 158 MG/DL (ref 65–140)
GLUCOSE SERPL-MCNC: 196 MG/DL (ref 65–140)
GLUCOSE SERPL-MCNC: 241 MG/DL (ref 65–140)
GLUCOSE UR STRIP-MCNC: ABNORMAL MG/DL
HCT VFR BLD AUTO: 24.7 % (ref 34.8–46.1)
HGB BLD-MCNC: 8.2 G/DL (ref 11.5–15.4)
HGB UR QL STRIP.AUTO: ABNORMAL
HYALINE CASTS #/AREA URNS LPF: ABNORMAL /LPF
KETONES UR STRIP-MCNC: NEGATIVE MG/DL
LEUKOCYTE ESTERASE UR QL STRIP: ABNORMAL
MCH RBC QN AUTO: 34.6 PG (ref 26.8–34.3)
MCHC RBC AUTO-ENTMCNC: 33.2 G/DL (ref 31.4–37.4)
MCV RBC AUTO: 104 FL (ref 82–98)
NITRITE UR QL STRIP: NEGATIVE
NON-SQ EPI CELLS URNS QL MICRO: ABNORMAL /HPF
PH UR STRIP.AUTO: 6 [PH]
PLATELET # BLD AUTO: 253 THOUSANDS/UL (ref 149–390)
PMV BLD AUTO: 9.7 FL (ref 8.9–12.7)
POTASSIUM SERPL-SCNC: 4.3 MMOL/L (ref 3.5–5.3)
PROT UR STRIP-MCNC: ABNORMAL MG/DL
RBC # BLD AUTO: 2.37 MILLION/UL (ref 3.81–5.12)
RBC #/AREA URNS AUTO: ABNORMAL /HPF
SODIUM SERPL-SCNC: 132 MMOL/L (ref 135–147)
SP GR UR STRIP.AUTO: 1.04 (ref 1–1.03)
UROBILINOGEN UR STRIP-ACNC: <2 MG/DL
WBC # BLD AUTO: 9.98 THOUSAND/UL (ref 4.31–10.16)
WBC #/AREA URNS AUTO: ABNORMAL /HPF
WBC CLUMPS # UR AUTO: PRESENT /UL

## 2024-09-05 PROCEDURE — 80048 BASIC METABOLIC PNL TOTAL CA: CPT | Performed by: STUDENT IN AN ORGANIZED HEALTH CARE EDUCATION/TRAINING PROGRAM

## 2024-09-05 PROCEDURE — 97535 SELF CARE MNGMENT TRAINING: CPT

## 2024-09-05 PROCEDURE — 97530 THERAPEUTIC ACTIVITIES: CPT

## 2024-09-05 PROCEDURE — 97110 THERAPEUTIC EXERCISES: CPT

## 2024-09-05 PROCEDURE — 90935 HEMODIALYSIS ONE EVALUATION: CPT | Performed by: INTERNAL MEDICINE

## 2024-09-05 PROCEDURE — 99232 SBSQ HOSP IP/OBS MODERATE 35: CPT | Performed by: STUDENT IN AN ORGANIZED HEALTH CARE EDUCATION/TRAINING PROGRAM

## 2024-09-05 PROCEDURE — 82948 REAGENT STRIP/BLOOD GLUCOSE: CPT

## 2024-09-05 PROCEDURE — 85027 COMPLETE CBC AUTOMATED: CPT | Performed by: STUDENT IN AN ORGANIZED HEALTH CARE EDUCATION/TRAINING PROGRAM

## 2024-09-05 PROCEDURE — 81001 URINALYSIS AUTO W/SCOPE: CPT | Performed by: INTERNAL MEDICINE

## 2024-09-05 PROCEDURE — 97116 GAIT TRAINING THERAPY: CPT

## 2024-09-05 PROCEDURE — 99232 SBSQ HOSP IP/OBS MODERATE 35: CPT | Performed by: INTERNAL MEDICINE

## 2024-09-05 PROCEDURE — NC001 PR NO CHARGE: Performed by: PODIATRIST

## 2024-09-05 RX ORDER — HEPARIN SODIUM 5000 [USP'U]/ML
5000 INJECTION, SOLUTION INTRAVENOUS; SUBCUTANEOUS EVERY 8 HOURS SCHEDULED
Status: DISCONTINUED | OUTPATIENT
Start: 2024-09-05 | End: 2024-09-06 | Stop reason: HOSPADM

## 2024-09-05 RX ADMIN — ISOSORBIDE MONONITRATE 30 MG: 30 TABLET, EXTENDED RELEASE ORAL at 17:22

## 2024-09-05 RX ADMIN — OXYCODONE HYDROCHLORIDE AND ACETAMINOPHEN 1 TABLET: 5; 325 TABLET ORAL at 21:57

## 2024-09-05 RX ADMIN — DOCUSATE SODIUM 100 MG: 100 CAPSULE, LIQUID FILLED ORAL at 17:22

## 2024-09-05 RX ADMIN — SEVELAMER HYDROCHLORIDE 1600 MG: 800 TABLET ORAL at 17:22

## 2024-09-05 RX ADMIN — SEVELAMER HYDROCHLORIDE 1600 MG: 800 TABLET ORAL at 12:12

## 2024-09-05 RX ADMIN — CITALOPRAM HYDROBROMIDE 40 MG: 20 TABLET ORAL at 10:03

## 2024-09-05 RX ADMIN — INSULIN GLARGINE 20 UNITS: 100 INJECTION, SOLUTION SUBCUTANEOUS at 10:05

## 2024-09-05 RX ADMIN — PANTOPRAZOLE SODIUM 40 MG: 40 TABLET, DELAYED RELEASE ORAL at 06:19

## 2024-09-05 RX ADMIN — HEPARIN SODIUM 5000 UNITS: 5000 INJECTION INTRAVENOUS; SUBCUTANEOUS at 17:22

## 2024-09-05 RX ADMIN — ALLOPURINOL 200 MG: 100 TABLET ORAL at 10:03

## 2024-09-05 RX ADMIN — INSULIN LISPRO 3 UNITS: 100 INJECTION, SOLUTION INTRAVENOUS; SUBCUTANEOUS at 21:53

## 2024-09-05 RX ADMIN — HEPARIN SODIUM 5000 UNITS: 5000 INJECTION INTRAVENOUS; SUBCUTANEOUS at 21:53

## 2024-09-05 RX ADMIN — INSULIN LISPRO 2 UNITS: 100 INJECTION, SOLUTION INTRAVENOUS; SUBCUTANEOUS at 17:23

## 2024-09-05 RX ADMIN — NITROGLYCERIN 1 INCH: 20 OINTMENT TOPICAL at 04:14

## 2024-09-05 RX ADMIN — CLOPIDOGREL BISULFATE 75 MG: 75 TABLET ORAL at 10:02

## 2024-09-05 RX ADMIN — SENNOSIDES AND DOCUSATE SODIUM 1 TABLET: 50; 8.6 TABLET ORAL at 10:03

## 2024-09-05 RX ADMIN — FUROSEMIDE 160 MG: 80 TABLET ORAL at 12:13

## 2024-09-05 RX ADMIN — DOCUSATE SODIUM 100 MG: 100 CAPSULE, LIQUID FILLED ORAL at 10:03

## 2024-09-05 RX ADMIN — ATORVASTATIN CALCIUM 80 MG: 80 TABLET, FILM COATED ORAL at 10:02

## 2024-09-05 RX ADMIN — OLANZAPINE 5 MG: 5 TABLET, FILM COATED ORAL at 21:53

## 2024-09-05 RX ADMIN — ASPIRIN 81 MG: 81 TABLET, COATED ORAL at 12:13

## 2024-09-05 NOTE — PLAN OF CARE
Problem: PHYSICAL THERAPY ADULT  Goal: Performs mobility at highest level of function for planned discharge setting.  See evaluation for individualized goals.  Description: Treatment/Interventions: Functional transfer training, LE strengthening/ROM, Elevations, Therapeutic exercise, Endurance training, Patient/family training, Equipment eval/education, Bed mobility, Gait training, Spoke to nursing, OT  Equipment Recommended: Walker (pt owns rollator at home for use)       See flowsheet documentation for full assessment, interventions and recommendations.  Outcome: Progressing  Note: Prognosis: Fair  Problem List: Decreased strength, Decreased endurance, Impaired balance, Decreased mobility, Impaired judgement, Obesity, Decreased skin integrity, Pain  Assessment: Pt seen for PT treatment session this date with interventions consisting of transfer training, gait training, and HEP, and education provided as needed for safety and direction to improve functional mobility, safety awareness, and activity tolerance. Pt agreeable to PT treatment session upon arrival, pt found seated out of bed in recliner . At end of session, pt left  seated out of bed in recliner with all needs in reach. In comparison to previous session, pt with improvement in activity tolerance, endurance, ambulation distances, AM- pac score, and functional mobility.  Pt is showing progress with improvements as noted.  Pt progressed with ambulation distances to 50' with use of Rw  with min- cg assist x1.  No gross lob noted,  noted  mildly antalgic gait on L le.  Pt fearful of falling due to feeling weak and pt reporting fatigue.  No dizziness reported.  Pt  performs transfers with supervision with verbal cues for safe technique,verbal cues for reminders to maintain use of Rw when turing and backing up to chair.  Pt  performs seated b/l le arom x 15 reps.  Pt tolerated well.     Continue to recommend  level III minimal rehab resource intensity  at time  of d/c in order to maximize pt's functional independence and safety w/ mobility. Pt continues to be functioning below baseline level. PT will continue to see pt while here in order to address the deficits listed above and provide interventions consistent w/ POC in effort to achieve STGs.    The patient's AM-PAC Basic Mobility Inpatient Short Form Raw Score is 19. A raw score greater than 16 suggests the patient may benefit from discharge to home. Please also refer to the recommendation of the Physical Therapist for safe discharge planning.  Barriers to Discharge: Inaccessible home environment ([place])     Rehab Resource Intensity Level, PT: III (Minimum Resource Intensity)    See flowsheet documentation for full assessment.

## 2024-09-05 NOTE — PROGRESS NOTES
NEPHROLOGY PROGRESS NOTE   Dee Dee Shaffer 63 y.o. female MRN: [id number]  Unit/Bed#: E4 -01 Encounter: [id number]      ASSESSMENT & PLAN:  1 ESRD on HD TTS at [facility].  Dry weight was recently adjusted to 108 kg.  Patient seen and examined this morning during dialysis treatment, AV fistula cannulated with good blood flow, plan for UF to keep her at her new dry weight  Stable from kidney standpoint of view to be discharged after as long as okay with other specialties    2.  Shortness of breath, volume overload, COVID.  Clinically improving, continue UF during dialysis.  COVID management as per primary team    #3 hypertension with intradialytic hypotension, continue with midodrine pre dialysis, blood pressure this morning is stable, continue with UF    4 anemia chronic kidney disease, Mircera and Venofer as an outpatient, monitor H&H and recommend blood transfusion for hemoglobin less than 7, hemoglobin this morning 8.2    #5 mineral bone disease, continue with renal diet and binders with meals    6.  Ischemic cardiomyopathy, elevated troponin with precordial chest pain, history of CAD status post cath and PCI, cardiology on board.  Status post cardiac cath with PCI on 9/4    #7 Access, left upper extremity AV fistula in place cannulated with good blood flow      Recommendations as above were discussed with internal medicine attending, seen and examined during dialysis, continue with UF, stable from kidney standpoint of view to be discharged as long as okay with other specialties and then continue with dialysis on Saturday at her outpatient unit    SUBJECTIVE:  Patient seen and examined during dialysis treatment, in general feeling better, denies any chest pain or shortness of breath, continues with some intermittent cough, no nausea, no vomiting, no abdominal pain      OBJECTIVE:  Current Weight: Weight - Scale: 112 kg (245 lb 13 oz)  Vitals:    09/05/24 0930   BP: 120/57   Pulse: 67   Resp: 16    Temp:    SpO2:        Intake/Output Summary (Last 24 hours) at 9/5/2024 0957  Last data filed at 9/5/2024 0835  Gross per 24 hour   Intake 200 ml   Output 625 ml   Net -425 ml       General: conscious, cooperative, in not acute distress  Eyes: conjunctivae pink, anicteric sclerae  ENT: lips and mucous membranes moist  Neck: supple, no JVD  Chest: clear breath sounds bilateral, no crackles, ronchus or wheezings  CVS: distinct S1 & S2, normal rate, regular rhythm  Abdomen: soft, non-tender, non-distended, normoactive bowel sounds  Extremities: no edema of both legs, AV fistula cannulated with good blood flow.  Skin: no rash  Neuro: awake, alert, oriented        Medications:    Current Facility-Administered Medications:     acetaminophen (TYLENOL) tablet 650 mg, 650 mg, Oral, Q4H PRN, Jacob Monk MD    allopurinol (ZYLOPRIM) tablet 200 mg, 200 mg, Oral, Daily, Jacob Monk MD, 200 mg at 09/04/24 0912    aspirin (ECOTRIN LOW STRENGTH) EC tablet 81 mg, 81 mg, Oral, Daily, Jacob Monk MD, 81 mg at 09/04/24 0903    atorvastatin (LIPITOR) tablet 80 mg, 80 mg, Oral, Daily, Jacob Monk MD, 80 mg at 09/04/24 0912    citalopram (CeleXA) tablet 40 mg, 40 mg, Oral, Daily, Jacob Monk MD, 40 mg at 09/04/24 0912    clopidogrel (PLAVIX) tablet 75 mg, 75 mg, Oral, Daily, Jacob Monk MD, 75 mg at 09/04/24 0913    docusate sodium (COLACE) capsule 100 mg, 100 mg, Oral, BID, Jacob Monk MD, 100 mg at 09/04/24 1716    ergocalciferol (VITAMIN D2) capsule 50,000 Units, 50,000 Units, Oral, Once per day on Monday Wednesday Friday, Jacob Monk MD, 50,000 Units at 09/04/24 0903    furosemide (LASIX) tablet 160 mg, 160 mg, Oral, Daily, Jacob Monk MD, 160 mg at 09/04/24 0913    insulin glargine (LANTUS) subcutaneous injection 20 Units 0.2 mL, 20 Units, Subcutaneous, [facility], Jacob Monk MD, 20 Units at 09/04/24 0916    insulin lispro (HumALOG/ADMELOG) 100 units/mL subcutaneous injection 1-6 Units, 1-6 Units,  Subcutaneous, 4x Daily (AC & HS), 2 Units at 09/04/24 2147 **AND** Fingerstick Glucose (POCT), , , 4x Daily AC and at bedtime, Jacob Monk MD    isosorbide mononitrate (IMDUR) 24 hr tablet 30 mg, 30 mg, Oral, QPM, Jacob Monk MD, 30 mg at 09/03/24 1737    lactulose (CHRONULAC) oral solution 20 g, 20 g, Oral, Daily PRN, Jacob Monk MD    levothyroxine tablet 50 mcg, 50 mcg, Oral, Daily Before Breakfast, Jacob Monk MD, 50 mcg at 09/04/24 0913    midodrine (PROAMATINE) tablet 5 mg, 5 mg, Oral, Before Dialysis, Jacob Monk MD, 5 mg at 09/03/24 0945    OLANZapine (ZyPREXA) tablet 5 mg, 5 mg, Oral, HS, Jacob Monk MD, 5 mg at 09/04/24 2147    ondansetron (ZOFRAN) injection 4 mg, 4 mg, Intravenous, Q6H PRN, Jacob Monk MD    oxyCODONE (ROXICODONE) immediate release tablet 10 mg, 10 mg, Oral, Q4H PRN, Jacob Monk MD, 10 mg at 09/04/24 2147    oxyCODONE-acetaminophen (PERCOCET) 5-325 mg per tablet 1 tablet, 1 tablet, Oral, Q4H PRN, Jacob Monk MD    pantoprazole (PROTONIX) EC tablet 40 mg, 40 mg, Oral, Early Morning, Jacob Monk MD, 40 mg at 09/05/24 0619    senna-docusate sodium (SENOKOT S) 8.6-50 mg per tablet 1 tablet, 1 tablet, Oral, Daily, Jacob Monk MD, 1 tablet at 09/04/24 0913    sevelamer (RENAGEL) tablet 1,600 mg, 1,600 mg, Oral, TID With Meals, Jacob Monk MD, 1,600 mg at 09/04/24 1630    Invasive Devices:        Lab Results:   Results from last 7 days   Lab Units 09/05/24  0855 09/04/24  0228 09/03/24  0512 09/02/24  0434 09/01/24  0551 08/31/24  0545 08/31/24  0023 08/30/24  1528   WBC Thousand/uL 9.98 10.39* 8.89   < > 8.13 7.31   < > 8.02   HEMOGLOBIN g/dL 8.2* 9.7* 8.8*   < > 8.2* 8.3*   < > 9.6*   HEMATOCRIT % 24.7* 29.8* 26.9*   < > 24.6* 24.8*   < > 28.1*   PLATELETS Thousands/uL 253 283 242   < > 221 206   < > 225   SODIUM mmol/L 132* 134* 133*   < > 135 132*  --  131*   POTASSIUM mmol/L 4.3 5.5* 3.5   < > 3.6 3.7  --  3.9   CHLORIDE mmol/L 98 97 96   < >  "96 92*  --  90*   CO2 mmol/L 27 26 26   < > 28 25  --  23   BUN mg/dL 40* 36* 51*   < > 34* 55*  --  50*   CREATININE mg/dL 4.76* 3.98* 5.00*   < > 3.74* 5.19*  --  4.56*   CALCIUM mg/dL 8.5 8.4 8.2*   < > 7.9* 8.0*  --  8.4   MAGNESIUM mg/dL  --   --   --   --  2.2 2.7  --   --    PHOSPHORUS mg/dL  --   --   --   --   --  5.2*  --   --    ALK PHOS U/L  --   --   --   --   --  85  --  93   ALT U/L  --   --   --   --   --  140*  --  91*   AST U/L  --   --   --   --   --  223*  --  188*    < > = values in this interval not displayed.           Portions of the record may have been created with voice recognition software. Occasional wrong word or \"sound a like\" substitutions may have occurred due to the inherent limitations of voice recognition software. Read the chart carefully and recognize, using context, where substitutions have occurred.If you have any questions, please contact the dictating provider.  "

## 2024-09-05 NOTE — ASSESSMENT & PLAN NOTE
Suspect this is contributing to admitting symptoms, possibly complicated by myocarditis  Completed 3 days of remdesivir  Currently improving, on room air

## 2024-09-05 NOTE — CASE MANAGEMENT
Case Management Discharge Planning Note    Patient name Dee Dee Shaffer  Location [facility] /E4 -* MRN [id number]  : 1960 Date 2024       Current Admission Date: 2024  Current Admission Diagnosis:Acute decompensated heart failure (HCC)   Patient Active Problem List    Diagnosis Date Noted Date Diagnosed    Bacteriuria 2024     COVID-19 2024     Acute decompensated heart failure ([facility]) 2024     Hypertension      Constipation 2024     Finger wound, simple, open, subsequent encounter 2024     Other specified peripheral vascular diseases ([facility]) 2024     Obesity, morbid ([facility]) 2024     Secondary hyperparathyroidism of renal origin ([facility]) 2024     Chronic obstructive pulmonary disease, unspecified COPD type ([facility]) 2023     Chronic pain disorder      Acute cystitis without hematuria 2023     Physical deconditioning 2023     Acquired absence of other left toe(s) ([facility]) 2023     Dependence on renal dialysis ([facility]) 2023     Polyarthralgia 2022     Chronic left shoulder pain 2022     NSVT (nonsustained ventricular tachycardia) ([facility]) 2022     History of anemia due to chronic kidney disease 2022     Troponin level elevated 2022     Continuous opioid dependence ([facility]) 02/10/2022     Adrenal nodule ([facility]) 02/10/2022     Suprapubic catheter ([facility]) 02/10/2022     Acquired hypothyroidism 10/14/2021     Mixed hyperlipidemia 10/14/2021     Gastroesophageal reflux disease without esophagitis 10/13/2021     Anemia due to chronic kidney disease, on chronic dialysis ([facility]) 2021     Chronic combined systolic and diastolic congestive heart failure ([facility]) 2021     Prolonged QT interval 2021     Neurogenic bladder 2021     History of heart artery stent 06/15/2021     CKD (chronic kidney disease) stage 4, GFR 15-29 ml/min ([facility]) 2021     ESRD (end stage renal disease) ([facility]) 2021      Memory problem 05/26/2021     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 ([facility]) 12/21/2020     Problem with vascular access 11/11/2020     Anemia 09/09/2020     S/P cervical spinal fusion 09/09/2020     Type 2 diabetes mellitus with chronic kidney disease on chronic dialysis, with long-term current use of insulin ([facility]) 09/02/2020     Severe episode of recurrent major depressive disorder, without psychotic features ([facility]) 09/02/2020     Coronary artery disease with stable angina pectoris ([facility]) 09/02/2020       LOS (days): 6  Geometric Mean LOS (GMLOS) (days):   Days to GMLOS:     OBJECTIVE:  Risk of Unplanned Readmission Score: 28.77         Current admission status: Inpatient   Preferred Pharmacy:   [facility] - [place] - [address] [address]  Phone: [phone] Fax: [phone]    [id number] - [place] - [address] [facility]  [address] [facility] [address]  Phone: [phone] Fax: [phone]    Primary Care Provider: CHERELLE Franklin    Primary Insurance: [facility]  Secondary Insurance: [facility]    DISCHARGE DETAILS:    Discharge planning discussed with:: Patients daughter  Freedom of Choice: Yes  Comments - Freedom of Choice: OP PT  CM contacted family/caregiver?: Yes  Were Treatment Team discharge recommendations reviewed with patient/caregiver?: Yes  Did patient/caregiver verbalize understanding of patient care needs?: Yes  Were patient/caregiver advised of the risks associated with not following Treatment Team discharge recommendations?: Yes    Contacts  Patient Contacts: Yenny Shaffer (Daughter)   [phone] (M)  Relationship to Patient:: Family  Contact Method: Phone  Phone Number: Yenny Shaffer (Daughter)   [phone]  Reason/Outcome: Continuity of Care, Emergency Contact, Discharge Planning    Requested Home Health Care         Is the patient  interested in HHC at discharge?: No    DME Referral Provided  Referral made for DME?: No         Would you like to participate in our [facility] service program?  : No - Declined    Treatment Team Recommendation: Home  Discharge Destination Plan:: Home  Transport at Discharge : Family                IMM Given (Date):: 09/05/24  IMM Given to:: Family          IMM reviewed with patient's caregiver, patient's caregiver agrees with discharge determination.    CM received PC from patient's daughter, they are moving all of patient's belongings today and are able to accommodate DC home tomorrow 9.6.24.  Family will be transporting patient home at time of DC.  CM notified SLIM.

## 2024-09-05 NOTE — ASSESSMENT & PLAN NOTE
Significantly elevated troponin without chest pain  Suspect type II non-MI related troponin elevation in setting of acute heart failure versus ACS versus myocarditis  S/p cardiac cath with PCI x 2  Continue aspirin, statin and Plavix

## 2024-09-05 NOTE — PHYSICAL THERAPY NOTE
PHYSICAL THERAPY NOTE          Patient Name: Dee Dee Shaffer  Today's Date: 9/5/2024 09/05/24 1525   Note Type   Note Type Treatment   Pain Assessment   Pain Assessment Tool 0-10   Pain Score 5   Pain Location/Orientation Orientation: Left;Location: [facility] Pain Intervention(s) Ambulation/increased activity;Emotional support;Rest   Restrictions/Precautions   Other Precautions Chair Alarm;Pain;Fall Risk;WBS;Suicidal;Contact/isolation;Airborne/isolation;Limb alert   General   Chart Reviewed Yes   Family/Caregiver Present No   Cognition   Overall Cognitive Status WFL   Arousal/Participation Alert;Responsive;Cooperative   Attention Within functional limits   Orientation Level Oriented X4   Memory Decreased recall of precautions   Following Commands Follows multistep commands without difficulty   Subjective   Subjective They said I may go home tomorrow.   Bed Mobility   Additional Comments pt  out of bed in chair upon arrival.   Transfers   Sit to Stand 5  Supervision   Additional items Assist x 1;Armrests;Increased time required;Verbal cues   Stand to Sit   (cg- close supervision x1)   Additional items Assist x 1;Increased time required;Verbal cues;Armrests  (cg x1.)   Additional Comments verbal cues for safe technique and to maintain use of Rw when turing and backing up to chair.   Ambulation/Elevation   Gait pattern Forward Flexion;Decreased L stance;Short stride;Excessively slow   Gait Assistance   (min- cg assist x1.)   Additional items Assist x 1;Verbal cues   Assistive Device Bariatric Rolling walker   Distance 50' x1   Balance   Static Sitting Normal   Dynamic Sitting Good   Static Standing Fair   Dynamic Standing Fair -   Ambulatory Fair -   Activity Tolerance   Activity Tolerance Patient limited by fatigue;Patient tolerated treatment well   Exercises   Hip Abduction Sitting;15 reps;AROM;Bilateral   Hip Adduction  Sitting;15 reps;AROM;Bilateral  (isometric hip add  x 15 reps.)   Knee AROM Long Arc Quad Sitting;15 reps;AROM;Bilateral   Ankle Pumps Sitting;15 reps;AROM;Bilateral  (heel/ toe raises in sitting)   Marching Sitting;15 reps;AROM;Bilateral   Assessment   Prognosis Fair   Problem List Decreased strength;Decreased endurance;Impaired balance;Decreased mobility;Impaired judgement;Obesity;Decreased skin integrity;Pain   Assessment Pt seen for PT treatment session this date with interventions consisting of transfer training, gait training, and HEP, and education provided as needed for safety and direction to improve functional mobility, safety awareness, and activity tolerance. Pt agreeable to PT treatment session upon arrival, pt found seated out of bed in recliner . At end of session, pt left  seated out of bed in recliner with all needs in reach. In comparison to previous session, pt with improvement in activity tolerance, endurance, ambulation distances, AM- pac score, and functional mobility.  Pt is showing progress with improvements as noted.  Pt progressed with ambulation distances to 50' with use of Rw  with min- cg assist x1.  No gross lob noted,  noted  mildly antalgic gait on L le.  Pt fearful of falling due to feeling weak and pt reporting fatigue.  No dizziness reported.  Pt  performs transfers with supervision with verbal cues for safe technique,verbal cues for reminders to maintain use of Rw when turing and backing up to chair.  Pt  performs seated b/l le arom x 15 reps.  Pt tolerated well.     Continue to recommend  level III minimal rehab resource intensity  at time of d/c in order to maximize pt's functional independence and safety w/ mobility. Pt continues to be functioning below baseline level. PT will continue to see pt while here in order to address the deficits listed above and provide interventions consistent w/ POC in effort to achieve STGs.    The patient's AM-PAC Basic Mobility Inpatient Short  Form Raw Score is 19. A raw score greater than 16 suggests the patient may benefit from discharge to home. Please also refer to the recommendation of the Physical Therapist for safe discharge planning.   Goals   Patient Goals to go home and be able tro get around.   STG Expiration Date 09/10/24   PT Treatment Day 1   Plan   Treatment/Interventions Functional transfer training;LE strengthening/ROM;Therapeutic exercise;Endurance training;Patient/family training;Equipment eval/education;Gait training;Spoke to nursing   Progress Progressing toward goals   PT Frequency 3-5x/wk   Discharge Recommendation   Rehab Resource Intensity Level, PT III (Minimum Resource Intensity)   AM-PAC Basic Mobility Inpatient   Turning in Flat Bed Without Bedrails 3   Lying on Back to Sitting on Edge of Flat Bed Without Bedrails 3   Moving Bed to Chair 4   Standing Up From Chair Using Arms 4   Walk in Room 3   Climb 3-5 Stairs With Railing 2   Basic Mobility Inpatient Raw Score 19   Basic Mobility Standardized Score 42.48   [facility] Highest Level Of Mobility   JH-HLM Goal 6: Walk 10 steps or more   JH-HLM Achieved 7: Walk 25 feet or more   Education   Education Provided Mobility training;Home exercise program;Assistive device   Patient Demonstrates acceptance/verbal understanding;Reinforcement needed   End of Consult   Patient Position at End of Consult Bedside chair;Bed/Chair alarm activated;All needs within reach     Praveena Fall, PTA

## 2024-09-05 NOTE — ASSESSMENT & PLAN NOTE
History of ESRD nonoliguric, with chronic Camargo catheter  UA did grow Pseudomonas Enterobacter, suspect likely colonization without any urinary symptoms  Patient requested to check UA, it is abnormal.  The patient denies any urinary symptoms  Would not recommend treating with antibiotics at this time   forearm

## 2024-09-05 NOTE — PROGRESS NOTES
[facility]  Progress Note  Name: Dee Dee Shaffer I  MRN: [id number]  Unit/Bed#: E4 -01 I Date of Admission: 8/30/2024   Date of Service: 9/5/2024 I Hospital Day: 6    Assessment & Plan   Bacteriuria  Assessment & Plan  History of ESRD nonoliguric, with chronic Camargo catheter  UA did grow Pseudomonas Enterobacter, suspect likely colonization without any urinary symptoms  Patient requested to check UA, it is abnormal.  The patient denies any urinary symptoms  Would not recommend treating with antibiotics at this time    COVID-19  Assessment & Plan  Suspect this is contributing to admitting symptoms, possibly complicated by myocarditis  Completed 3 days of remdesivir  Currently improving, on room air    Hypertension  Assessment & Plan  Continue home BP meds    Chronic obstructive pulmonary disease, unspecified COPD type ([facility])  Assessment & Plan  Not currently in exacerbation    Troponin level elevated  Assessment & Plan  Significantly elevated troponin without chest pain  Suspect type II non-MI related troponin elevation in setting of acute heart failure versus ACS versus myocarditis  S/p cardiac cath with PCI x 2  Continue aspirin, statin and Plavix    Coronary artery disease with stable angina pectoris (HCC)  Assessment & Plan  History of coronary artery disease with multiple coronary artery stents  Continue medical management with Plavix, statin, imdur  2D echo showing EF of 23% with severe global hypokinesis, regional variation wall motion abnormality  S/p cardiac cath requiring intervention PCI of left main and ostial left circumflex  Continue aspirin Plavix and statin  Recommend cardiac rehab on discharge    Mixed hyperlipidemia  Assessment & Plan  Continue statin therapy    Anemia due to chronic kidney disease, on chronic dialysis (HCC)  Assessment & Plan  Hemoglobin currently stable    Neurogenic bladder  Assessment & Plan  Chronic Camargo catheter    ESRD (end stage renal  disease) ([facility])  Assessment & Plan  Lab Results   Component Value Date    EGFR 9 2024    EGFR 11 2024    EGFR 8 2024    CREATININE 4.76 (H) 2024    CREATININE 3.98 (H) 2024    CREATININE 5.00 (H) 2024     Appreciate nephrology recommendations    Type 2 diabetes mellitus with chronic kidney disease on chronic dialysis, with long-term current use of insulin ([facility])  Assessment & Plan  Continue basal bolus regimen plus sliding scale insulin      * Acute decompensated heart failure ([facility])  Assessment & Plan  Wt Readings from Last 3 Encounters:   24 112 kg (245 lb 13 oz)   24 108 kg (237 lb)   24 109 kg (240 lb)     Patient reports generalized weakness.  Suspect volume overload secondary to increased fluid intake  Continue Lasix 160mg po  Continue volume removal with hemodialysis  Patient symptoms appear to improve, currently on room air.  Denies any shortness of breath             VTE Pharmacologic Prophylaxis:   Pharmacologic: Heparin  Mechanical VTE Prophylaxis in Place: Yes    Current Length of Stay: 6 day(s)    Current Patient Status: Inpatient   Certification Statement: The patient will continue to require additional inpatient hospital stay due to family currently moving    Discharge Plan: home tomorrow    Code Status: Level 1 - Full Code      Subjective:   No events overnight.  Denies any chest pain or shortness of breath.  Discussed discharge today, reports that her family is currently moving.  There is concern that they may not have electricity tonight.    Objective:     Vitals:   Temp (24hrs), Av.1 °F (36.2 °C), Min:96.9 °F (36.1 °C), Max:97.6 °F (36.4 °C)    Temp:  [96.9 °F (36.1 °C)-97.6 °F (36.4 °C)] 97 °F (36.1 °C)  HR:  [65-84] 69  Resp:  [16-18] 16  BP: (100-142)/(47-74) 124/62  SpO2:  [85 %-100 %] 95 %  Body mass index is 38.5 kg/m².     Input and Output Summary (last 24 hours):       Intake/Output Summary (Last 24 hours) at 2024 1238  Last data  filed at 9/5/2024 0835  Gross per 24 hour   Intake 200 ml   Output 625 ml   Net -425 ml       Physical Exam:     Physical Exam  Vitals and nursing note reviewed.   Constitutional:       Appearance: She is obese.   HENT:      Head: Normocephalic.   Eyes:      Conjunctiva/sclera: Conjunctivae normal.   Cardiovascular:      Rate and Rhythm: Normal rate.   Pulmonary:      Effort: Pulmonary effort is normal. No respiratory distress.   Abdominal:      General: Bowel sounds are normal. There is no distension.      Palpations: Abdomen is soft.   Musculoskeletal:         General: No swelling.      Right lower leg: No edema.      Left lower leg: No edema.   Skin:     General: Skin is warm and dry.   Neurological:      General: No focal deficit present.      Mental Status: She is alert. Mental status is at baseline.           Additional Data:     Labs:    Results from last 7 days   Lab Units 09/05/24  0855 09/04/24  0228 09/03/24  0512   WBC Thousand/uL 9.98   < > 8.89   HEMOGLOBIN g/dL 8.2*   < > 8.8*   HEMATOCRIT % 24.7*   < > 26.9*   PLATELETS Thousands/uL 253   < > 242   SEGS PCT %  --   --  68   LYMPHO PCT %  --   --  23   MONO PCT %  --   --  6   EOS PCT %  --   --  2    < > = values in this interval not displayed.     Results from last 7 days   Lab Units 09/05/24  0855 09/01/24  0551 08/31/24  0545   SODIUM mmol/L 132*   < > 132*   POTASSIUM mmol/L 4.3   < > 3.7   CHLORIDE mmol/L 98   < > 92*   CO2 mmol/L 27   < > 25   BUN mg/dL 40*   < > 55*   CREATININE mg/dL 4.76*   < > 5.19*   ANION GAP mmol/L 7   < > 15*   CALCIUM mg/dL 8.5   < > 8.0*   ALBUMIN g/dL  --   --  3.5   TOTAL BILIRUBIN mg/dL  --   --  0.56   ALK PHOS U/L  --   --  85   ALT U/L  --   --  140*   AST U/L  --   --  223*   GLUCOSE RANDOM mg/dL 158*   < > 170*    < > = values in this interval not displayed.     Results from last 7 days   Lab Units 08/30/24  2236   INR  1.37*     Results from last 7 days   Lab Units 09/05/24  1206 09/05/24  0735 09/04/24  203  09/04/24  1613 09/04/24  1138 09/04/24  0910 09/03/24  2036 09/03/24  1651 09/03/24  1205 09/03/24  0910 09/02/24  2127 09/02/24  1543   POC GLUCOSE mg/dl 142* 102 214* 129 135 139 210* 215* 181* 184* 224* 275*     Results from last 7 days   Lab Units 08/31/24  1412   HEMOGLOBIN A1C % 6.5*               * I Have Reviewed All Lab Data Listed Above.  * Additional Pertinent Lab Tests Reviewed: All Labs For Current Hospital Admission Reviewed    Mobility:  Basic Mobility Inpatient Raw Score: 21  -[facility] Goal: 6: Walk 10 steps or more  -[facility] Achieved: 6: Walk 10 steps or more    Lines:     Invasive Devices       Peripheral Intravenous Line  Duration             Peripheral IV 09/04/24 Right;Upper;Ventral (anterior) Arm 1 day              Line  Duration             Hemodialysis Access 02/06/23 Left Upper arm 577 days              Drain  Duration             Suprapubic Catheter 16 Fr. 34 days                       Imaging:    Imaging Reports Reviewed Today Include:     XR chest 2 views    Result Date: 8/30/2024  Impression: Small bilateral pleural effusions and mild to moderate pulmonary interstitial edema. Workstation performed: [id number]        Recent Cultures (last 7 days):     Results from last 7 days   Lab Units 08/30/24  1854   URINE CULTURE  >100,000 cfu/ml Enterobacter cloacae*  >100,000 cfu/ml Pseudomonas aeruginosa*       Last 24 Hours Medication List:   Current Facility-Administered Medications   Medication Dose Route Frequency Provider Last Rate    acetaminophen  650 mg Oral Q4H PRN Jacob Monk MD      allopurinol  200 mg Oral Daily Jacob Monk MD      aspirin  81 mg Oral Daily Jacob Monk MD      atorvastatin  80 mg Oral Daily Jacob Monk MD      citalopram  40 mg Oral Daily Jacob Monk MD      clopidogrel  75 mg Oral Daily Jacob Monk MD      docusate sodium  100 mg Oral BID Jacob Monk MD      ergocalciferol  50,000 Units Oral Once per day on Monday Wednesday Friday Jacob Grider  MD Lacho      furosemide  160 mg Oral Daily Jacob Monk MD      insulin glargine  20 Units Subcutaneous QAM Jacob Monk MD      insulin lispro  1-6 Units Subcutaneous 4x Daily (AC & HS) Jacob Monk MD      isosorbide mononitrate  30 mg Oral QPM Jacob Monk MD      lactulose  20 g Oral Daily PRN Jacob Monk MD      levothyroxine  50 mcg Oral Daily Before Breakfast Jacob Monk MD      midodrine  5 mg Oral Before Dialysis Jacob Monk MD      OLANZapine  5 mg Oral HS Jacob Monk MD      ondansetron  4 mg Intravenous Q6H PRN Jacob Monk MD      oxyCODONE  10 mg Oral Q4H PRN Jacob Monk MD      oxyCODONE-acetaminophen  1 tablet Oral Q4H PRN Jacob Monk MD      pantoprazole  40 mg Oral Early Morning Jacob Monk MD      senna-docusate sodium  1 tablet Oral Daily Jacob Monk MD      sevelamer  1,600 mg Oral TID With Meals Jacob Monk MD          Today, Patient Was Seen By: Jacob Monk MD    ** Please Note: Dictation voice to text software may have been used in the creation of this document. **

## 2024-09-05 NOTE — CONSULTS
Podiatry - Consultation    Patient Information:   Dee Dee Shaffer 63 y.o. female MRN: [id number]  Unit/Bed#: E4 -01 Encounter: [id number]  PCP: CHERELLE Franklin  Date of Admission:  8/30/2024  Date of Consultation: 09/05/24  Requesting Physician: Jacob Monk MD      ASSESSMENT:    Dee Dee Shaffer is a 63 y.o. female with:    L foot blister  T2DM  Acute decompensated heart failure  ESRD    PLAN:    L foot serous blister drained, remains stable with no acute clinical signs of infection. Podiatry signing off at this time. Consult again if needed.   Local wound care consisting of adaptic DSD. Wound care instructions placed. Appreciate nursing assistance with dressing changes.   Elevation and offloading on green foam wedges or pillows when non-ambulatory.  Rest of care per primary team.  Will discuss this plan with my attending and update as needed.    Weightbearing status: Weightbearing as tolerated    SUBJECTIVE:    History of Present Illness:    Dee Dee Shaffer is a 63 y.o. female who is originally admitted 8/30/2024 due to acute decompensated heart failure. Patient has a past medical history of T2DM, COPD, CAD, heart failure, ESRD.  We are consulted for L foot blister. Patient states that she noticed this blister about 1 hour ago. She denies any pain to her feet. Reports some discomfort to left foot. Reports numbness to feet b/l.     Review of Systems:    Constitutional: Negative.    HENT: Negative.    Eyes: Negative.    Respiratory: Negative.    Cardiovascular: Negative.    Gastrointestinal: Negative.    Musculoskeletal: negative   Skin:left foot blister   Neurological: numbness to b/l feet   Psych: Negative.     Past Medical and Surgical History:     Past Medical History:   Diagnosis Date    Abnormal liver function     Anemia     Anxiety     Arthritis     CHF (congestive heart failure) (HCC)     Chronic kidney disease     stage 3    Chronic narcotic dependence (HCC)     Chronic pain  disorder     lower back, hands , neck and knees    Coronary artery disease     Depression     Diabetes mellitus (HCC)     Elevated troponin 02/11/2022    GERD (gastroesophageal reflux disease)     no meds at present    Heart murmur     murmur    Hyperlipidemia     Hypertension     Neurogenic bladder     Open toe wound 12/2020    right big toe open calus but is dry at present    PONV (postoperative nausea and vomiting)     Renal disorder     Shortness of breath     exertion    Skin ulcer of hand, limited to breakdown of skin (HCC) 02/25/2021    Sleep apnea     doesn't use cpap    Suprapubic catheter ([facility])     Urinary retention        Past Surgical History:   Procedure Laterality Date    AMPUTATION Left     2nd toe    ANGIOPLASTY  2017 5    BREAST EXCISIONAL BIOPSY Left     BREAST SURGERY      CARDIAC CATHETERIZATION      CARDIAC CATHETERIZATION N/A 07/01/2022    Procedure: Cardiac catheterization;  Surgeon: Minh Franz MD;  Location: [facility];  Service: Cardiology    CARDIAC CATHETERIZATION N/A 07/01/2022    Procedure: Cardiac pci;  Surgeon: Minh Franz MD;  [facility];  Service: Cardiology    CARDIAC CATHETERIZATION Left 9/4/2024    Procedure: Cardiac Left Heart Cath;  Surgeon: Dajuan Mac MD;  [facility];  Service: Cardiology    CARDIAC CATHETERIZATION N/A 9/4/2024    Procedure: Cardiac Coronary Angiogram;  Surgeon: Dajuan Mac MD;  [facility];  Service: Cardiology    CARDIAC CATHETERIZATION N/A 9/4/2024    Procedure: Cardiac PCI Stent;  Surgeon: Dajuan Mac MD;  [facility];  Service: Cardiology    CARPAL TUNNEL RELEASE Bilateral     CERVICAL FUSION      HYSTERECTOMY  2008    IR AV FISTULAGRAM/GRAFTOGRAM  04/26/2023    IR AV FISTULAGRAM/GRAFTOGRAM  2/28/2024    IR SUPRAPUBIC CATHETER PLACEMENT  06/15/2021    IR SUPRAPUBIC CATHETER PLACEMENT  02/14/2023    IR TUNNELED CENTRAL LINE PLACEMENT  04/04/2023    IR TUNNELED  DIALYSIS CATHETER PLACEMENT  07/15/2022    IR TUNNELED DIALYSIS CATHETER PLACEMENT  12/12/2022    IR TUNNELED DIALYSIS CATHETER REMOVAL  8/29/2023    KNEE SURGERY      OOPHORECTOMY  2008    WI ARTERIOVENOUS ANASTOMOSIS OPEN DIRECT Left 02/06/2023    Procedure: CREATION FISTULA  ARTERIOVENOUS (AV);  Surgeon: Minna Herbert DO;  Location: [facility];  Service: Vascular    WI EXC B9 LESION MRGN XCP SK TG S/N/H/F/G 3.1-4.0CM N/A 12/21/2020    Procedure: EXCISION SEBACEOUS CYST X 5 SCALP;  Surgeon: Minh Benton MD;  Location: [facility] MAIN OR;  Service: General    WI REVJ OPN ARVEN FSTL W/O THRMBC DIAL GRF Left 7/3/2023    Procedure: REVISION AV FISTULA;  Surgeon: Minna Herbert DO;  Location: [facility];  Service: Vascular    TOE AMPUTATION Left     TRIGGER FINGER RELEASE Right     4th Finger       Meds/Allergies:      Facility-Administered Medications Prior to Admission:     cyanocobalamin injection 1,000 mcg    Medications Prior to Admission:     allopurinol (ZYLOPRIM) 100 mg tablet    aspirin (Aspirin Low Dose) 81 mg EC tablet    atorvastatin (LIPITOR) 40 mg tablet    Blood Glucose Monitoring Suppl (OneTouch Verio Reflect) w/Device KIT    calcitriol (ROCALTROL) 0.5 MCG capsule    citalopram (CeleXA) 40 mg tablet    clopidogrel (PLAVIX) 75 mg tablet    Continuous Blood Gluc Sensor (Dexcom G7 Sensor)    docusate sodium (COLACE) 100 mg capsule    ergocalciferol (VITAMIN D2) 50,000 units    glucose blood (OneTouch Verio) test strip    insulin degludec (Tresiba FlexTouch) 200 units/mL CONCENTRATED U-200 injection pen    Insulin Pen Needle (BD Pen Needle Micro U/F) 32G X 6 MM MISC    Insulin Pen Needle (BD Pen Needle Joanne 2nd Gen) 32G X 4 MM MISC    isosorbide mononitrate (IMDUR) 30 mg 24 hr tablet    lactulose (CHRONULAC) 10 g/15 mL solution    levothyroxine 50 mcg tablet    losartan (COZAAR) 25 mg tablet    midodrine (PROAMATINE) 5 mg tablet    nystatin (MYCOSTATIN) powder    OLANZapine (ZyPREXA) 5 mg tablet     ondansetron (ZOFRAN) 4 mg tablet    OneTouch Delica Lancets 33G MISC    oxyCODONE (ROXICODONE) 10 MG TABS    Ozempic, 2 MG/DOSE, 8 MG/3ML injection pen    senna-docusate sodium (Senexon-S) 8.6-50 mg per tablet    sevelamer carbonate (RENVELA) 800 mg tablet    silver sulfadiazine (SILVADENE,SSD) 1 % cream    [] amoxicillin (AMOXIL) 500 mg capsule    furosemide (LASIX) 80 mg tablet    hydrALAZINE (APRESOLINE) 25 mg tablet    ketoconazole (NIZORAL) 2 % cream    naloxone (NARCAN) 4 mg/0.1 mL nasal spray    nitroglycerin (NITROSTAT) 0.4 mg SL tablet    pantoprazole (PROTONIX) 40 mg tablet    Allergies   Allergen Reactions    Latex Itching    Codeine GI Intolerance       Social History:     Marital Status:     Substance Use History:   Social History     Substance and Sexual Activity   Alcohol Use Not Currently     Social History     Tobacco Use   Smoking Status Former    Current packs/day: 0.00    Average packs/day: 1 pack/day for 35.0 years (35.0 ttl pk-yrs)    Types: Cigarettes    Start date:     Quit date:     Years since quittin.6    Passive exposure: Past   Smokeless Tobacco Never     Social History     Substance and Sexual Activity   Drug Use Never       Family History:    Family History   Problem Relation Age of Onset    Stroke Father     Heart disease Father     No Known Problems Mother     No Known Problems Sister     No Known Problems Daughter     No Known Problems Maternal Grandmother     No Known Problems Maternal Grandfather     No Known Problems Paternal Grandmother     No Known Problems Paternal Grandfather     No Known Problems Maternal Aunt     No Known Problems Maternal Aunt     No Known Problems Maternal Aunt     No Known Problems Maternal Aunt     No Known Problems Maternal Aunt     No Known Problems Maternal Aunt     No Known Problems Paternal Aunt          OBJECTIVE:    Vitals:   Blood Pressure: 118/60 (24 1135)  Pulse: 70 (24 1135)  Temperature: (!) 97.2 °F  "(36.2 °C) (09/05/24 1135)  Temp Source: Tympanic (09/05/24 1135)  Respirations: 16 (09/05/24 1135)  Height: 5' 7\" (170.2 cm) (09/02/24 1135)  Weight - Scale: 112 kg (245 lb 13 oz) (09/05/24 0600)  SpO2: 95 % (09/05/24 0700)    Physical Exam:    General Appearance: Alert, cooperative, no distress.  HEENT: Head normocephalic, atraumatic, without obvious abnormality.  Heart: Normal rate and rhythm.  Lungs: Non-labored breathing. No respiratory distress.  Abdomen: Without distension.  Psychiatric: AAOx3  Lower Extremity:    Vascular:   DP: Right: 1+ Left: 1+  PT: Right: 1+ Left: 1+  CRT < 3 seconds at the digits. +3/4 edema noted at bilateral lower extremities.   Pedal hair is absent.   Skin temperature is WNL bilaterally.    Musculoskeletal:  MMT is 5/5 in all muscle compartments bilaterally.   ROM at the 1st MPJ and ankle joint are WNL bilaterally with the leg extended.   No Pain on palpation of foot and ankle.   No gross deformities noted.     Dermatological:  L foot with blister to dorsal foot. Fluctuant serous blister with intact roof. No purulence or bloody drainage.     Neurological:  Gross sensation is intact.   Light touch is diminished.   Protective sensation is diminished.    Clinical Images 09/05/24:      Additional data:     Lab Results: I have personally reviewed pertinent labs including:    Results from last 7 days   Lab Units 09/05/24  0855 09/04/24  0228 09/03/24  0512   WBC Thousand/uL 9.98   < > 8.89   HEMOGLOBIN g/dL 8.2*   < > 8.8*   HEMATOCRIT % 24.7*   < > 26.9*   PLATELETS Thousands/uL 253   < > 242   SEGS PCT %  --   --  68   LYMPHO PCT %  --   --  23   MONO PCT %  --   --  6   EOS PCT %  --   --  2    < > = values in this interval not displayed.     Results from last 7 days   Lab Units 09/05/24  0855 09/01/24  0551 08/31/24  0545   POTASSIUM mmol/L 4.3   < > 3.7   CHLORIDE mmol/L 98   < > 92*   CO2 mmol/L 27   < > 25   BUN mg/dL 40*   < > 55*   CREATININE mg/dL 4.76*   < > 5.19*   CALCIUM mg/dL " "8.5   < > 8.0*   ALK PHOS U/L  --   --  85   ALT U/L  --   --  140*   AST U/L  --   --  223*    < > = values in this interval not displayed.     Results from last 7 days   Lab Units 08/30/24  2236   INR  1.37*       Cultures: I have personally reviewed pertinent cultures including:    Results from last 7 days   Lab Units 08/30/24  1854   URINE CULTURE  >100,000 cfu/ml Enterobacter cloacae*  >100,000 cfu/ml Pseudomonas aeruginosa*           Imaging: I have personally reviewed pertinent reports in PACS.  EKG, Pathology, and Other Studies: I have personally reviewed pertinent reports.    Time Spent for Care: 30 minutes.  More than 50% of total time spent on counseling and coordination of care as described above.      ** Please Note: Portions of the record may have been created with voice recognition software. Occasional wrong word or \"sound a like\" substitutions may have occurred due to the inherent limitations of voice recognition software. Read the chart carefully and recognize, using context, where substitutions have occurred. **    "

## 2024-09-05 NOTE — ASSESSMENT & PLAN NOTE
History of coronary artery disease with multiple coronary artery stents  Continue medical management with Plavix, statin, imdur  2D echo showing EF of 23% with severe global hypokinesis, regional variation wall motion abnormality  S/p cardiac cath requiring intervention PCI of left main and ostial left circumflex  Continue aspirin Plavix and statin  Recommend cardiac rehab on discharge

## 2024-09-05 NOTE — ASSESSMENT & PLAN NOTE
Wt Readings from Last 3 Encounters:   09/05/24 112 kg (245 lb 13 oz)   08/19/24 108 kg (237 lb)   08/02/24 109 kg (240 lb)     Patient reports generalized weakness.  Suspect volume overload secondary to increased fluid intake  Continue Lasix 160mg po  Continue volume removal with hemodialysis  Patient symptoms appear to improve, currently on room air.  Denies any shortness of breath

## 2024-09-05 NOTE — OCCUPATIONAL THERAPY NOTE
Occupational Therapy Progress Note     Patient Name: Dee Dee Shaffer  Today's Date: 9/5/2024  Problem List  Principal Problem:    Acute decompensated heart failure (HCC)  Active Problems:    Type 2 diabetes mellitus with chronic kidney disease on chronic dialysis, with long-term current use of insulin (HCC)    ESRD (end stage renal disease) ([facility])    Neurogenic bladder    Anemia due to chronic kidney disease, on chronic dialysis ([facility])    Mixed hyperlipidemia    Coronary artery disease with stable angina pectoris ([facility])    Troponin level elevated    Chronic obstructive pulmonary disease, unspecified COPD type ([facility])    Hypertension    COVID-19    Bacteriuria          09/05/24 1329   OT Last Visit   OT Visit Date 09/05/24   Note Type   Note Type Treatment   Pain Assessment   Pain Assessment Tool 0-10   Pain Score 5   Pain Location/Orientation Orientation: Bilateral;Location: Back;Location: Leg   Patient's Stated Pain Goal No pain   Hospital Pain Intervention(s) Repositioned;Ambulation/increased activity;Emotional support;Rest   Multiple Pain Sites No   Restrictions/Precautions   Weight Bearing Precautions Per Order No   Other Precautions Chair Alarm;Bed Alarm;Contact/isolation;Airborne/isolation;Fall Risk;Pain;Limb alert   ADL   Where Assessed Chair   Grooming Assistance 6  Modified Independent   Grooming Deficit Increased time to complete   UB Dressing Assistance 5  Supervision/Setup   UB Dressing Deficit Setup;Supervision/safety;Increased time to complete   LB Dressing Assistance 5  Supervision/Setup   LB Dressing Deficit Setup;Supervision/safety;Increased time to complete   Functional Standing Tolerance   Time ~3 mins   Activity Dynamic standing balance activity   Comments Pt requiring 1 seated rest break 2* fatigue. Fair- dynamic standing balance demonstrated   Bed Mobility   Supine to Sit 6  Modified independent   Additional items HOB elevated;Bedrails;Increased time required   Sit to Supine Unable to assess    Additional Comments Pt seated OOB in chair with chair alarm activated at end of session. Call bell and phone within reach. All needs met and pt reports no further questions for OT at this time.   Transfers   Sit to Stand 5  Supervision   Additional items Bedrails;Increased time required   Stand to Sit 5  Supervision   Additional items Armrests;Increased time required;Verbal cues   Functional Mobility   Functional Mobility 5  Supervision   Additional items Rolling walker   Cognition   Overall Cognitive Status WFL   Arousal/Participation Alert;Cooperative   Attention Within functional limits   Orientation Level Oriented X4   Memory Decreased recall of precautions   Following Commands Follows one step commands without difficulty   Activity Tolerance   Activity Tolerance Patient tolerated treatment well;Patient limited by fatigue   Medical Staff Made Aware KEVIN Matthews   Assessment   Assessment Pt seen for OT treatment session focusing on functional activity tolerance, bed mobility, ADLs, functional transfers/mobility, and Safe transfer techniques. Pt alert and cooperative throughout session. Pt lying supine at start of session, completing bed mobility (supine>sit) at a Mod I level w/ use of bed rails and increased time to complete. Transfers (sit<>stand) completed w/ Supervision w/ verbal cues for safe technique and hand placement. Supervision required for functional mobility with Fair- dynamic standing balance demonstrated w/ RW. Pt required 1 seated rest break 2* increased fatigue reported. ADL tasks completed while seated in chair w/ overall Supervision 2* setup required and increased time to complete. Pt w/ Fair- dynamic standing balance when standing w/o UE support to simulate pulling underwear over hips. Pt noted w/ large blister on L foot upon doffing L sock- Pt reports blister is new. RN aware. Pt seated OOB in chair with chair alarm activated at end of session. Call bell and phone within reach. All needs met  and pt reports no further questions for OT at this time. The patient's raw score on the AM-PAC Daily Activity Inpatient Short Form is 20. A raw score of greater than or equal to 19 suggests the patient may benefit from discharge to home. Please refer to the recommendation of the Occupational Therapist for safe discharge planning. OT to follow pt on caseload.   Plan   Treatment Interventions ADL retraining;Functional transfer training;Endurance training;Patient/family training;Equipment evaluation/education;Compensatory technique education;Continued evaluation;Activityengagement   Goal Expiration Date 09/14/24   OT Treatment Day 1   OT Frequency Other (comment)  (2-4x/wk)   Discharge Recommendation   Rehab Resource Intensity Level, OT III (Minimum Resource Intensity)   AM-PAC Daily Activity Inpatient   Lower Body Dressing 3   Bathing 3   Toileting 3   Upper Body Dressing 3   Grooming 4   Eating 4   Daily Activity Raw Score 20   Daily Activity Standardized Score (Calc for Raw Score >=11) 42.03   -[facility] Applied Cognition Inpatient   Following a Speech/Presentation 4   Understanding Ordinary Conversation 4   Taking Medications 3   Remembering Where Things Are Placed or Put Away 3   Remembering List of 4-5 Errands 3   Taking Care of Complicated Tasks 3   Applied Cognition Raw Score 20   Applied Cognition Standardized Score 41.76         Mechelle Pimentel, OTR/L

## 2024-09-05 NOTE — ASSESSMENT & PLAN NOTE
Lab Results   Component Value Date    EGFR 9 09/05/2024    EGFR 11 09/04/2024    EGFR 8 09/03/2024    CREATININE 4.76 (H) 09/05/2024    CREATININE 3.98 (H) 09/04/2024    CREATININE 5.00 (H) 09/03/2024     Appreciate nephrology recommendations

## 2024-09-05 NOTE — PROGRESS NOTES
"  Cardiology         Progress Note - Cardiology   Dee Dee Shaffer 63 y.o. female MRN: [id number]  Unit/Bed#: E4 -01 Encounter: [id number]          Assessment/Recommendations/Discussion:   Volume overload  Chronic HFrEF  LVEF 38%, moderate LVH, probable diastolic dysfunction, mild LA dilatation, AV sclerosis, trace AR, MV sclerosis, mild MR/TR, small pericardial effusion, June 2022  Elevated troponin with precordial chest pain, likely type 1 NSTEMI  COVID-19 infection, August 2024  Ischemic cardiomyopathy  Type 1 NSTEMI s/p PCI to ostial LCX 9/4/24  s/p cath w/ JANET-mLCx, moderate diffuse LAD and 95% small RCA (treated medically), July 2022  s/p PCI x5 to unknown vessels in 2012 and 2017 in [place]  Type 2 diabetes mellitus  Hypertension  Dyslipidemia  ESRD on HD  Neurogenic bladder with suprapubic catheter      Doing well in the aftermath of ostial circumflex PCI yesterday.  Continue aspirin, clopidogrel, high-dose atorvastatin  Continue p.o. furosemide, guided by nephrology along with dialysis for volume management  Continue losartan, isosorbide  Will arrange outpatient follow-up with Dr. England          Subjective: Patient seen and examined, feels well, denies chest pain, shortness of breath                Physical Exam:  GEN:  NAD  HEENT:  MMM, NCAT, pink conjunctiva, EOMI, nonicteric sclera  CV:  NO JVD/HJR, RR, NO M/R/G, +S1/S2, NO PARASTERNAL HEAVE/THRILL, NO LE EDEMA, NO HEPATIC SYSTOLIC PULSATION, WARM EXTREMITIES  RESP:  CTAB/L  ABD:  SOFT, NT, NO GROSS ORGANOMEGALY        Vitals:   /62   Pulse 69   Temp (!) 97 °F (36.1 °C) (Tympanic)   Resp 16   Ht 5' 7\" (1.702 m)   Wt 112 kg (245 lb 13 oz)   SpO2 95%   BMI 38.50 kg/m²   Vitals:    09/04/24 0623 09/05/24 0600   Weight: 111 kg (244 lb 11.4 oz) 112 kg (245 lb 13 oz)       Intake/Output Summary (Last 24 hours) at 9/5/2024 1248  Last data filed at 9/5/2024 0835  Gross per 24 hour   Intake 200 ml   Output 625 ml   Net -425 ml     Lab " Results:  Results from last 7 days   Lab Units 09/05/24  0855   WBC Thousand/uL 9.98   HEMOGLOBIN g/dL 8.2*   HEMATOCRIT % 24.7*   PLATELETS Thousands/uL 253     Results from last 7 days   Lab Units 09/05/24  0855 09/01/24  0551 08/31/24  0545   POTASSIUM mmol/L 4.3   < > 3.7   CHLORIDE mmol/L 98   < > 92*   CO2 mmol/L 27   < > 25   BUN mg/dL 40*   < > 55*   CREATININE mg/dL 4.76*   < > 5.19*   CALCIUM mg/dL 8.5   < > 8.0*   ALK PHOS U/L  --   --  85   ALT U/L  --   --  140*   AST U/L  --   --  223*    < > = values in this interval not displayed.     Results from last 7 days   Lab Units 09/05/24  0855   POTASSIUM mmol/L 4.3   CHLORIDE mmol/L 98   CO2 mmol/L 27   BUN mg/dL 40*   CREATININE mg/dL 4.76*   CALCIUM mg/dL 8.5           Medications:    Current Facility-Administered Medications:     acetaminophen (TYLENOL) tablet 650 mg, 650 mg, Oral, Q4H PRN, Jacob Monk MD    allopurinol (ZYLOPRIM) tablet 200 mg, 200 mg, Oral, Daily, Jacob Monk MD, 200 mg at 09/05/24 1003    aspirin (ECOTRIN LOW STRENGTH) EC tablet 81 mg, 81 mg, Oral, Daily, Jacob Monk MD, 81 mg at 09/05/24 1213    atorvastatin (LIPITOR) tablet 80 mg, 80 mg, Oral, Daily, Jacob Monk MD, 80 mg at 09/05/24 1002    citalopram (CeleXA) tablet 40 mg, 40 mg, Oral, Daily, Jacob Monk MD, 40 mg at 09/05/24 1003    clopidogrel (PLAVIX) tablet 75 mg, 75 mg, Oral, Daily, Jacob Monk MD, 75 mg at 09/05/24 1002    docusate sodium (COLACE) capsule 100 mg, 100 mg, Oral, BID, Jacob Monk MD, 100 mg at 09/05/24 1003    ergocalciferol (VITAMIN D2) capsule 50,000 Units, 50,000 Units, Oral, Once per day on Monday Wednesday Friday, Jacob Monk MD, 50,000 Units at 09/04/24 0903    furosemide (LASIX) tablet 160 mg, 160 mg, Oral, Daily, Jacob Monk MD, 160 mg at 09/05/24 1213    heparin (porcine) subcutaneous injection 5,000 Units, 5,000 Units, Subcutaneous, Q8H [facility], Jacob Monk MD    insulin glargine (LANTUS) subcutaneous injection 20  Units 0.2 mL, 20 Units, Subcutaneous, QAM, Jacob Monk MD, 20 Units at 09/05/24 1005    insulin lispro (HumALOG/ADMELOG) 100 units/mL subcutaneous injection 1-6 Units, 1-6 Units, Subcutaneous, 4x Daily (AC & HS), 2 Units at 09/04/24 2147 **AND** Fingerstick Glucose (POCT), , , 4x Daily AC and at bedtime, Jacob Monk MD    isosorbide mononitrate (IMDUR) 24 hr tablet 30 mg, 30 mg, Oral, QPM, Jacob Monk MD, 30 mg at 09/03/24 1737    lactulose (CHRONULAC) oral solution 20 g, 20 g, Oral, Daily PRN, Jacob Monk MD    levothyroxine tablet 50 mcg, 50 mcg, Oral, Daily Before Breakfast, Jacob Monk MD, 50 mcg at 09/04/24 0913    midodrine (PROAMATINE) tablet 5 mg, 5 mg, Oral, Before Dialysis, Jacob Monk MD, 5 mg at 09/03/24 0945    OLANZapine (ZyPREXA) tablet 5 mg, 5 mg, Oral, HS, Jacob Monk MD, 5 mg at 09/04/24 2147    ondansetron (ZOFRAN) injection 4 mg, 4 mg, Intravenous, Q6H PRN, Jacob Monk MD    oxyCODONE (ROXICODONE) immediate release tablet 10 mg, 10 mg, Oral, Q4H PRN, Jacob Monk MD, 10 mg at 09/04/24 2147    oxyCODONE-acetaminophen (PERCOCET) 5-325 mg per tablet 1 tablet, 1 tablet, Oral, Q4H PRN, Jacob Monk MD    pantoprazole (PROTONIX) EC tablet 40 mg, 40 mg, Oral, Early Morning, Jacob Monk MD, 40 mg at 09/05/24 0619    senna-docusate sodium (SENOKOT S) 8.6-50 mg per tablet 1 tablet, 1 tablet, Oral, Daily, Jacob Monk MD, 1 tablet at 09/05/24 1003    sevelamer (RENAGEL) tablet 1,600 mg, 1,600 mg, Oral, TID With Meals, Jacob Monk MD, 1,600 mg at 09/05/24 1212    This note was completed in part utilizing M-Modal Fluency Direct Software.  Grammatical errors, random word insertions, spelling mistakes, and incomplete sentences may be an occasional consequence of this system secondary to software limitations, ambient noise, and hardware issues.  If you have any questions or concerns about the content, text, or information contained within the body of this  dictation, please contact the provider for clarification.

## 2024-09-05 NOTE — PLAN OF CARE
Problem: Potential for Falls  Goal: Patient will remain free of falls  Description: INTERVENTIONS:  - Educate patient/family on patient safety including physical limitations  - Instruct patient to call for assistance with activity   - Consult OT/PT to assist with strengthening/mobility   - Keep Call bell within reach  - Keep bed low and locked with side rails adjusted as appropriate  - Keep care items and personal belongings within reach  - Initiate and maintain comfort rounds  - Make Fall Risk Sign visible to staff  - Offer Toileting every 2 Hours, in advance of need  - Initiate/Maintain bed alarm  - Obtain necessary fall risk management equipment:   - Apply yellow socks and bracelet for high fall risk patients  - Consider moving patient to room near nurses station  Outcome: Progressing     Problem: Prexisting or High Potential for Compromised Skin Integrity  Goal: Skin integrity is maintained or improved  Description: INTERVENTIONS:  - Identify patients at risk for skin breakdown  - Assess and monitor skin integrity  - Assess and monitor nutrition and hydration status  - Monitor labs   - Assess for incontinence   - Turn and reposition patient  - Assist with mobility/ambulation  - Relieve pressure over bony prominences  - Avoid friction and shearing  - Provide appropriate hygiene as needed including keeping skin clean and dry  - Evaluate need for skin moisturizer/barrier cream  - Collaborate with interdisciplinary team   - Patient/family teaching  - Consider wound care consult   Outcome: Progressing     Problem: PAIN - ADULT  Goal: Verbalizes/displays adequate comfort level or baseline comfort level  Description: Interventions:  - Encourage patient to monitor pain and request assistance  - Assess pain using appropriate pain scale  - Administer analgesics based on type and severity of pain and evaluate response  - Implement non-pharmacological measures as appropriate and evaluate response  - Consider cultural and  social influences on pain and pain management  - Notify physician/advanced practitioner if interventions unsuccessful or patient reports new pain  Outcome: Progressing     Problem: INFECTION - ADULT  Goal: Absence or prevention of progression during hospitalization  Description: INTERVENTIONS:  - Assess and monitor for signs and symptoms of infection  - Monitor lab/diagnostic results  - Monitor all insertion sites, i.e. indwelling lines, tubes, and drains  - Monitor endotracheal if appropriate and nasal secretions for changes in amount and color  - [place] appropriate cooling/warming therapies per order  - Administer medications as ordered  - Instruct and encourage patient and family to use good hand hygiene technique  - Identify and instruct in appropriate isolation precautions for identified infection/condition  Outcome: Progressing  Goal: Absence of fever/infection during neutropenic period  Description: INTERVENTIONS:  - Monitor WBC    Outcome: Progressing     Problem: SAFETY ADULT  Goal: Patient will remain free of falls  Description: INTERVENTIONS:  - Educate patient/family on patient safety including physical limitations  - Instruct patient to call for assistance with activity   - Consult OT/PT to assist with strengthening/mobility   - Keep Call bell within reach  - Keep bed low and locked with side rails adjusted as appropriate  - Keep care items and personal belongings within reach  - Initiate and maintain comfort rounds  - Make Fall Risk Sign visible to staff  - Offer Toileting every 2 Hours, in advance of need  - Initiate/Maintain bed alarm  - Obtain necessary fall risk management equipment:   - Apply yellow socks and bracelet for high fall risk patients  - Consider moving patient to room near nurses station  Outcome: Progressing  Goal: Maintain or return to baseline ADL function  Description: INTERVENTIONS:  -  Assess patient's ability to carry out ADLs; assess patient's baseline for ADL function and  identify physical deficits which impact ability to perform ADLs (bathing, care of mouth/teeth, toileting, grooming, dressing, etc.)  - Assess/evaluate cause of self-care deficits   - Assess range of motion  - Assess patient's mobility; develop plan if impaired  - Assess patient's need for assistive devices and provide as appropriate  - Encourage maximum independence but intervene and supervise when necessary  - Involve family in performance of ADLs  - Assess for home care needs following discharge   - Consider OT consult to assist with ADL evaluation and planning for discharge  - Provide patient education as appropriate  Outcome: Progressing  Goal: Maintains/Returns to pre admission functional level  Description: INTERVENTIONS:  - Perform AM-PAC 6 Click Basic Mobility/ Daily Activity assessment daily.  - Set and communicate daily mobility goal to care team and patient/family/caregiver.   - Collaborate with rehabilitation services on mobility goals if consulted  - Perform Range of Motion 3 times a day.  - Reposition patient every 2 hours.  - Dangle patient 3 times a day  - Stand patient 3 times a day  - Ambulate patient 3 times a day  - Out of bed to chair 3 times a day   - Out of bed for meals 3 times a day  - Out of bed for toileting  - Record patient progress and toleration of activity level   Outcome: Progressing     Problem: DISCHARGE PLANNING  Goal: Discharge to home or other facility with appropriate resources  Description: INTERVENTIONS:  - Identify barriers to discharge w/patient and caregiver  - Arrange for needed discharge resources and transportation as appropriate  - Identify discharge learning needs (meds, wound care, etc.)  - Arrange for interpretive services to assist at discharge as needed  - Refer to Case Management Department for coordinating discharge planning if the patient needs post-hospital services based on physician/advanced practitioner order or complex needs related to functional status,  cognitive ability, or social support system  Outcome: Progressing     Problem: Knowledge Deficit  Goal: Patient/family/caregiver demonstrates understanding of disease process, treatment plan, medications, and discharge instructions  Description: Complete learning assessment and assess knowledge base.  Interventions:  - Provide teaching at level of understanding  - Provide teaching via preferred learning methods  Outcome: Progressing     Problem: CARDIOVASCULAR - ADULT  Goal: Maintains optimal cardiac output and hemodynamic stability  Description: INTERVENTIONS:  - Monitor I/O, vital signs and rhythm  - Monitor for S/S and trends of decreased cardiac output  - Administer and titrate ordered vasoactive medications to optimize hemodynamic stability  - Assess quality of pulses, skin color and temperature  - Assess for signs of decreased coronary artery perfusion  - Instruct patient to report change in severity of symptoms  Outcome: Progressing  Goal: Absence of cardiac dysrhythmias or at baseline rhythm  Description: INTERVENTIONS:  - Continuous cardiac monitoring, vital signs, obtain 12 lead EKG if ordered  - Administer antiarrhythmic and heart rate control medications as ordered  - Monitor electrolytes and administer replacement therapy as ordered  Outcome: Progressing     Problem: RESPIRATORY - ADULT  Goal: Achieves optimal ventilation and oxygenation  Description: INTERVENTIONS:  - Assess for changes in respiratory status  - Assess for changes in mentation and behavior  - Position to facilitate oxygenation and minimize respiratory effort  - Oxygen administered by appropriate delivery if ordered  - Initiate smoking cessation education as indicated  - Encourage broncho-pulmonary hygiene including cough, deep breathe, Incentive Spirometry  - Assess the need for suctioning and aspirate as needed  - Assess and instruct to report SOB or any respiratory difficulty  - Respiratory Therapy support as indicated  Outcome:  Progressing     Problem: GASTROINTESTINAL - ADULT  Goal: Minimal or absence of nausea and/or vomiting  Description: INTERVENTIONS:  - Administer IV fluids if ordered to ensure adequate hydration  - Maintain NPO status until nausea and vomiting are resolved  - Nasogastric tube if ordered  - Administer ordered antiemetic medications as needed  - Provide nonpharmacologic comfort measures as appropriate  - Advance diet as tolerated, if ordered  - Consider nutrition services referral to assist patient with adequate nutrition and appropriate food choices  Outcome: Progressing  Goal: Maintains or returns to baseline bowel function  Description: INTERVENTIONS:  - Assess bowel function  - Encourage oral fluids to ensure adequate hydration  - Administer IV fluids if ordered to ensure adequate hydration  - Administer ordered medications as needed  - Encourage mobilization and activity  - Consider nutritional services referral to assist patient with adequate nutrition and appropriate food choices  Outcome: Progressing  Goal: Maintains adequate nutritional intake  Description: INTERVENTIONS:  - Monitor percentage of each meal consumed  - Identify factors contributing to decreased intake, treat as appropriate  - Assist with meals as needed  - Monitor I&O, weight, and lab values if indicated  - Obtain nutrition services referral as needed  Outcome: Progressing  Goal: Establish and maintain optimal ostomy function  Description: INTERVENTIONS:  - Assess bowel function  - Encourage oral fluids to ensure adequate hydration  - Administer IV fluids if ordered to ensure adequate hydration   - Administer ordered medications as needed  - Encourage mobilization and activity  - Nutrition services referral to assist patient with appropriate food choices  - Assess stoma site  - Consider wound care consult   Outcome: Progressing  Goal: Oral mucous membranes remain intact  Description: INTERVENTIONS  - Assess oral mucosa and hygiene practices  -  Implement preventative oral hygiene regimen  - Implement oral medicated treatments as ordered  - Initiate Nutrition services referral as needed  Outcome: Progressing     Problem: GENITOURINARY - ADULT  Goal: Maintains or returns to baseline urinary function  Description: INTERVENTIONS:  - Assess urinary function  - Encourage oral fluids to ensure adequate hydration if ordered  - Administer IV fluids as ordered to ensure adequate hydration  - Administer ordered medications as needed  - Offer frequent toileting  - Follow urinary retention protocol if ordered  Outcome: Progressing  Goal: Absence of urinary retention  Description: INTERVENTIONS:  - Assess patient’s ability to void and empty bladder  - Monitor I/O  - Bladder scan as needed  - Discuss with physician/AP medications to alleviate retention as needed  - Discuss catheterization for long term situations as appropriate  Outcome: Progressing  Goal: Urinary catheter remains patent  Description: INTERVENTIONS:  - Assess patency of urinary catheter  - If patient has a chronic morales, consider changing catheter if non-functioning  - Follow guidelines for intermittent irrigation of non-functioning urinary catheter  Outcome: Progressing     Problem: METABOLIC, FLUID AND ELECTROLYTES - ADULT  Goal: Electrolytes maintained within normal limits  Description: INTERVENTIONS:  - Monitor labs and assess patient for signs and symptoms of electrolyte imbalances  - Administer electrolyte replacement as ordered  - Monitor response to electrolyte replacements, including repeat lab results as appropriate  - Instruct patient on fluid and nutrition as appropriate  Outcome: Progressing  Goal: Fluid balance maintained  Description: INTERVENTIONS:  - Monitor labs   - Monitor I/O and WT  - Instruct patient on fluid and nutrition as appropriate  - Assess for signs & symptoms of volume excess or deficit  Outcome: Progressing  Goal: Glucose maintained within target range  Description:  INTERVENTIONS:  - Monitor Blood Glucose as ordered  - Assess for signs and symptoms of hyperglycemia and hypoglycemia  - Administer ordered medications to maintain glucose within target range  - Assess nutritional intake and initiate nutrition service referral as needed  Outcome: Progressing     Problem: Nutrition/Hydration-ADULT  Goal: Nutrient/Hydration intake appropriate for improving, restoring or maintaining nutritional needs  Description: Monitor and assess patient's nutrition/hydration status for malnutrition. Collaborate with interdisciplinary team and initiate plan and interventions as ordered.  Monitor patient's weight and dietary intake as ordered or per policy. Utilize nutrition screening tool and intervene as necessary. Determine patient's food preferences and provide high-protein, high-caloric foods as appropriate.     INTERVENTIONS:  - Monitor oral intake, urinary output, labs, and treatment plans  - Assess nutrition and hydration status and recommend course of action  - Evaluate amount of meals eaten  - Assist patient with eating if necessary   - Allow adequate time for meals  - Recommend/ encourage appropriate diets, oral nutritional supplements, and vitamin/mineral supplements  - Order, calculate, and assess calorie counts as needed  - Recommend, monitor, and adjust tube feedings and TPN/PPN based on assessed needs  - Assess need for intravenous fluids  - Provide specific nutrition/hydration education as appropriate  - Include patient/family/caregiver in decisions related to nutrition  Outcome: Progressing

## 2024-09-05 NOTE — PLAN OF CARE
Post-Dialysis RN Treatment Note    Blood Pressure:  Pre-142/69 mm/Hg  Post-118/60 mmHg   EDW-108 kg    Weight:  Pre-111.5 kg   Post-108.5 kg   Mode of weight measurement: Standing Scale   Volume Removed-3000 ml    Treatment duration-180 minutes    NS given-  No    Treatment shortened? No   Medications given during Rx-Not Applicable, pt did not need Midodrine    Estimated Kt/V- Not Applicable   Access type: AV fistula   Access Issues: No    Report called to primary nurse   Yes-Cassie Brock RN        Goal- remove 3 L as tolerated using 2 K+ bath over 4 hr hd tx.    Problem: METABOLIC, FLUID AND ELECTROLYTES - ADULT  Goal: Electrolytes maintained within normal limits  Description: INTERVENTIONS:  - Monitor labs and assess patient for signs and symptoms of electrolyte imbalances  - Administer electrolyte replacement as ordered  - Monitor response to electrolyte replacements, including repeat lab results as appropriate  - Instruct patient on fluid and nutrition as appropriate  Outcome: Progressing

## 2024-09-05 NOTE — PLAN OF CARE
Problem: OCCUPATIONAL THERAPY ADULT  Goal: Performs self-care activities at highest level of function for planned discharge setting.  See evaluation for individualized goals.  Description: Treatment Interventions: ADL retraining, Functional transfer training, UE strengthening/ROM, Endurance training, Patient/family training, Equipment evaluation/education, Compensatory technique education, Continued evaluation, Energy conservation, Activityengagement          See flowsheet documentation for full assessment, interventions and recommendations.   Outcome: Progressing  Note: Limitation: Decreased ADL status, Decreased UE strength, Decreased endurance, Decreased self-care trans, Decreased high-level ADLs  Prognosis: Good  Assessment: Pt seen for OT treatment session focusing on functional activity tolerance, bed mobility, ADLs, functional transfers/mobility, and Safe transfer techniques. Pt alert and cooperative throughout session. Pt lying supine at start of session, completing bed mobility (supine>sit) at a Mod I level w/ use of bed rails and increased time to complete. Transfers (sit<>stand) completed w/ Supervision w/ verbal cues for safe technique and hand placement. Supervision required for functional mobility with Fair- dynamic standing balance demonstrated w/ RW. Pt required 1 seated rest break 2* increased fatigue reported. ADL tasks completed while seated in chair w/ overall Supervision 2* setup required and increased time to complete. Pt w/ Fair- dynamic standing balance when standing w/o UE support to simulate pulling underwear over hips. Pt noted w/ large blister on L foot upon doffing L sock- Pt reports blister is new. RN aware. Pt seated OOB in chair with chair alarm activated at end of session. Call bell and phone within reach. All needs met and pt reports no further questions for OT at this time. The patient's raw score on the AM-PAC Daily Activity Inpatient Short Form is 20. A raw score of greater  than or equal to 19 suggests the patient may benefit from discharge to home. Please refer to the recommendation of the Occupational Therapist for safe discharge planning. OT to follow pt on caseload.     Rehab Resource Intensity Level, OT: III (Minimum Resource Intensity)

## 2024-09-06 ENCOUNTER — PATIENT OUTREACH (OUTPATIENT)
Dept: FAMILY MEDICINE CLINIC | Facility: CLINIC | Age: 64
End: 2024-09-06

## 2024-09-06 VITALS
WEIGHT: 243.17 LBS | RESPIRATION RATE: 18 BRPM | OXYGEN SATURATION: 95 % | DIASTOLIC BLOOD PRESSURE: 66 MMHG | SYSTOLIC BLOOD PRESSURE: 147 MMHG | TEMPERATURE: 97.1 F | HEIGHT: 67 IN | HEART RATE: 78 BPM | BODY MASS INDEX: 38.17 KG/M2

## 2024-09-06 DIAGNOSIS — F11.20 CONTINUOUS OPIOID DEPENDENCE (HCC): ICD-10-CM

## 2024-09-06 DIAGNOSIS — G89.4 CHRONIC PAIN SYNDROME: ICD-10-CM

## 2024-09-06 PROBLEM — S90.829A BLISTER OF FOOT: Status: ACTIVE | Noted: 2024-09-06

## 2024-09-06 LAB
GLUCOSE SERPL-MCNC: 171 MG/DL (ref 65–140)
GLUCOSE SERPL-MCNC: 234 MG/DL (ref 65–140)

## 2024-09-06 PROCEDURE — 82948 REAGENT STRIP/BLOOD GLUCOSE: CPT

## 2024-09-06 PROCEDURE — 99232 SBSQ HOSP IP/OBS MODERATE 35: CPT | Performed by: INTERNAL MEDICINE

## 2024-09-06 PROCEDURE — 99239 HOSP IP/OBS DSCHRG MGMT >30: CPT | Performed by: STUDENT IN AN ORGANIZED HEALTH CARE EDUCATION/TRAINING PROGRAM

## 2024-09-06 PROCEDURE — 5A1D70Z PERFORMANCE OF URINARY FILTRATION, INTERMITTENT, LESS THAN 6 HOURS PER DAY: ICD-10-PCS | Performed by: INTERNAL MEDICINE

## 2024-09-06 RX ORDER — INSULIN LISPRO 100 [IU]/ML
5 INJECTION, SOLUTION INTRAVENOUS; SUBCUTANEOUS
Start: 2024-09-06

## 2024-09-06 RX ORDER — METOPROLOL SUCCINATE 25 MG/1
12.5 TABLET, EXTENDED RELEASE ORAL DAILY
Qty: 15 TABLET | Refills: 0 | Status: SHIPPED | OUTPATIENT
Start: 2024-09-06 | End: 2024-10-06

## 2024-09-06 RX ORDER — INSULIN DEGLUDEC 200 U/ML
20 INJECTION, SOLUTION SUBCUTANEOUS DAILY
Start: 2024-09-06

## 2024-09-06 RX ADMIN — SENNOSIDES AND DOCUSATE SODIUM 1 TABLET: 50; 8.6 TABLET ORAL at 08:47

## 2024-09-06 RX ADMIN — ASPIRIN 81 MG: 81 TABLET, COATED ORAL at 08:47

## 2024-09-06 RX ADMIN — PANTOPRAZOLE SODIUM 40 MG: 40 TABLET, DELAYED RELEASE ORAL at 06:57

## 2024-09-06 RX ADMIN — CLOPIDOGREL BISULFATE 75 MG: 75 TABLET ORAL at 08:47

## 2024-09-06 RX ADMIN — FUROSEMIDE 160 MG: 80 TABLET ORAL at 08:48

## 2024-09-06 RX ADMIN — ERGOCALCIFEROL 50000 UNITS: 1.25 CAPSULE ORAL at 09:34

## 2024-09-06 RX ADMIN — CITALOPRAM HYDROBROMIDE 40 MG: 20 TABLET ORAL at 08:47

## 2024-09-06 RX ADMIN — INSULIN LISPRO 1 UNITS: 100 INJECTION, SOLUTION INTRAVENOUS; SUBCUTANEOUS at 08:45

## 2024-09-06 RX ADMIN — ALLOPURINOL 200 MG: 100 TABLET ORAL at 08:47

## 2024-09-06 RX ADMIN — OXYCODONE HYDROCHLORIDE AND ACETAMINOPHEN 1 TABLET: 5; 325 TABLET ORAL at 13:15

## 2024-09-06 RX ADMIN — SEVELAMER HYDROCHLORIDE 1600 MG: 800 TABLET ORAL at 08:50

## 2024-09-06 RX ADMIN — INSULIN LISPRO 3 UNITS: 100 INJECTION, SOLUTION INTRAVENOUS; SUBCUTANEOUS at 12:01

## 2024-09-06 RX ADMIN — HEPARIN SODIUM 5000 UNITS: 5000 INJECTION INTRAVENOUS; SUBCUTANEOUS at 13:15

## 2024-09-06 RX ADMIN — SEVELAMER HYDROCHLORIDE 1600 MG: 800 TABLET ORAL at 12:01

## 2024-09-06 RX ADMIN — DOCUSATE SODIUM 100 MG: 100 CAPSULE, LIQUID FILLED ORAL at 08:47

## 2024-09-06 RX ADMIN — ATORVASTATIN CALCIUM 80 MG: 80 TABLET, FILM COATED ORAL at 08:47

## 2024-09-06 RX ADMIN — LEVOTHYROXINE SODIUM 50 MCG: 50 TABLET ORAL at 06:57

## 2024-09-06 RX ADMIN — INSULIN GLARGINE 20 UNITS: 100 INJECTION, SOLUTION SUBCUTANEOUS at 08:41

## 2024-09-06 RX ADMIN — HEPARIN SODIUM 5000 UNITS: 5000 INJECTION INTRAVENOUS; SUBCUTANEOUS at 06:57

## 2024-09-06 NOTE — ASSESSMENT & PLAN NOTE
Wt Readings from Last 3 Encounters:   09/06/24 110 kg (243 lb 2.7 oz)   08/19/24 108 kg (237 lb)   08/02/24 109 kg (240 lb)     Patient reports generalized weakness.  Suspect volume overload secondary to increased fluid intake  Continue Lasix 160mg po  Continue volume removal with hemodialysis  Patient symptoms appear to improve, currently on room air.  Denies any shortness of breath

## 2024-09-06 NOTE — ASSESSMENT & PLAN NOTE
Continue home blood pressure medications Imdur 30 mg once daily, losartan 25 mg daily and added Toprol-XL 12.5 mg daily  Discontinue hydralazine on discharge

## 2024-09-06 NOTE — PLAN OF CARE
Problem: Potential for Falls  Goal: Patient will remain free of falls  Description: INTERVENTIONS:  - Educate patient/family on patient safety including physical limitations  - Instruct patient to call for assistance with activity   - Consult OT/PT to assist with strengthening/mobility   - Keep Call bell within reach  - Keep bed low and locked with side rails adjusted as appropriate  - Keep care items and personal belongings within reach  - Initiate and maintain comfort rounds  - Make Fall Risk Sign visible to staff  Outcome: Completed     Problem: Prexisting or High Potential for Compromised Skin Integrity  Goal: Skin integrity is maintained or improved  Description: INTERVENTIONS:  - Identify patients at risk for skin breakdown  - Assess and monitor skin integrity  - Assess and monitor nutrition and hydration status  - Monitor labs   - Assess for incontinence   - Turn and reposition patient  - Assist with mobility/ambulation  - Relieve pressure over bony prominences  - Avoid friction and shearing  - Provide appropriate hygiene as needed including keeping skin clean and dry  - Evaluate need for skin moisturizer/barrier cream  - Collaborate with interdisciplinary team   - Patient/family teaching  - Consider wound care consult   Outcome: Completed     Problem: PAIN - ADULT  Goal: Verbalizes/displays adequate comfort level or baseline comfort level  Description: Interventions:  - Encourage patient to monitor pain and request assistance  - Assess pain using appropriate pain scale  - Administer analgesics based on type and severity of pain and evaluate response  - Implement non-pharmacological measures as appropriate and evaluate response  - Consider cultural and social influences on pain and pain management  - Notify physician/advanced practitioner if interventions unsuccessful or patient reports new pain  Outcome: Completed     Problem: INFECTION - ADULT  Goal: Absence or prevention of progression during  hospitalization  Description: INTERVENTIONS:  - Assess and monitor for signs and symptoms of infection  - Monitor lab/diagnostic results  - Monitor all insertion sites, i.e. indwelling lines, tubes, and drains  - Monitor endotracheal if appropriate and nasal secretions for changes in amount and color  - [place] appropriate cooling/warming therapies per order  - Administer medications as ordered  - Instruct and encourage patient and family to use good hand hygiene technique  - Identify and instruct in appropriate isolation precautions for identified infection/condition  Outcome: Completed  Goal: Absence of fever/infection during neutropenic period  Description: INTERVENTIONS:  - Monitor WBC    Outcome: Completed     Problem: SAFETY ADULT  Goal: Patient will remain free of falls  Description: INTERVENTIONS:  - Educate patient/family on patient safety including physical limitations  - Instruct patient to call for assistance with activity   - Consult OT/PT to assist with strengthening/mobility   - Keep Call bell within reach  - Keep bed low and locked with side rails adjusted as appropriate  - Keep care items and personal belongings within reach  - Initiate and maintain comfort rounds  - Make Fall Risk Sign visible to staff  - Consider moving patient to room near nurses station  Outcome: Completed  Goal: Maintain or return to baseline ADL function  Description: INTERVENTIONS:  -  Assess patient's ability to carry out ADLs; assess patient's baseline for ADL function and identify physical deficits which impact ability to perform ADLs (bathing, care of mouth/teeth, toileting, grooming, dressing, etc.)  - Assess/evaluate cause of self-care deficits   - Assess range of motion  - Assess patient's mobility; develop plan if impaired  - Assess patient's need for assistive devices and provide as appropriate  - Encourage maximum independence but intervene and supervise when necessary  - Involve family in performance of ADLs  -  Assess for home care needs following discharge   - Consider OT consult to assist with ADL evaluation and planning for discharge  - Provide patient education as appropriate  Outcome: Completed  Goal: Maintains/Returns to pre admission functional level  Description: INTERVENTIONS:  - Perform AM-PAC 6 Click Basic Mobility/ Daily Activity assessment daily.  - Set and communicate daily mobility goal to care team and patient/family/caregiver.   - Collaborate with rehabilitation services on mobility goals if consulted  - Record patient progress and toleration of activity level   Outcome: Completed     Problem: DISCHARGE PLANNING  Goal: Discharge to home or other facility with appropriate resources  Description: INTERVENTIONS:  - Identify barriers to discharge w/patient and caregiver  - Arrange for needed discharge resources and transportation as appropriate  - Identify discharge learning needs (meds, wound care, etc.)  - Arrange for interpretive services to assist at discharge as needed  - Refer to Case Management Department for coordinating discharge planning if the patient needs post-hospital services based on physician/advanced practitioner order or complex needs related to functional status, cognitive ability, or social support system  Outcome: Completed     Problem: Knowledge Deficit  Goal: Patient/family/caregiver demonstrates understanding of disease process, treatment plan, medications, and discharge instructions  Description: Complete learning assessment and assess knowledge base.  Interventions:  - Provide teaching at level of understanding  - Provide teaching via preferred learning methods  Outcome: Completed     Problem: CARDIOVASCULAR - ADULT  Goal: Maintains optimal cardiac output and hemodynamic stability  Description: INTERVENTIONS:  - Monitor I/O, vital signs and rhythm  - Monitor for S/S and trends of decreased cardiac output  - Administer and titrate ordered vasoactive medications to optimize hemodynamic  stability  - Assess quality of pulses, skin color and temperature  - Assess for signs of decreased coronary artery perfusion  - Instruct patient to report change in severity of symptoms  Outcome: Completed  Goal: Absence of cardiac dysrhythmias or at baseline rhythm  Description: INTERVENTIONS:  - Continuous cardiac monitoring, vital signs, obtain 12 lead EKG if ordered  - Administer antiarrhythmic and heart rate control medications as ordered  - Monitor electrolytes and administer replacement therapy as ordered  Outcome: Completed     Problem: RESPIRATORY - ADULT  Goal: Achieves optimal ventilation and oxygenation  Description: INTERVENTIONS:  - Assess for changes in respiratory status  - Assess for changes in mentation and behavior  - Position to facilitate oxygenation and minimize respiratory effort  - Oxygen administered by appropriate delivery if ordered  - Initiate smoking cessation education as indicated  - Encourage broncho-pulmonary hygiene including cough, deep breathe, Incentive Spirometry  - Assess the need for suctioning and aspirate as needed  - Assess and instruct to report SOB or any respiratory difficulty  - Respiratory Therapy support as indicated  Outcome: Completed     Problem: GASTROINTESTINAL - ADULT  Goal: Minimal or absence of nausea and/or vomiting  Description: INTERVENTIONS:  - Administer IV fluids if ordered to ensure adequate hydration  - Maintain NPO status until nausea and vomiting are resolved  - Nasogastric tube if ordered  - Administer ordered antiemetic medications as needed  - Provide nonpharmacologic comfort measures as appropriate  - Advance diet as tolerated, if ordered  - Consider nutrition services referral to assist patient with adequate nutrition and appropriate food choices  Outcome: Completed  Goal: Maintains or returns to baseline bowel function  Description: INTERVENTIONS:  - Assess bowel function  - Encourage oral fluids to ensure adequate hydration  - Administer IV  fluids if ordered to ensure adequate hydration  - Administer ordered medications as needed  - Encourage mobilization and activity  - Consider nutritional services referral to assist patient with adequate nutrition and appropriate food choices  Outcome: Completed  Goal: Maintains adequate nutritional intake  Description: INTERVENTIONS:  - Monitor percentage of each meal consumed  - Identify factors contributing to decreased intake, treat as appropriate  - Assist with meals as needed  - Monitor I&O, weight, and lab values if indicated  - Obtain nutrition services referral as needed  Outcome: Completed  Goal: Establish and maintain optimal ostomy function  Description: INTERVENTIONS:  - Assess bowel function  - Encourage oral fluids to ensure adequate hydration  - Administer IV fluids if ordered to ensure adequate hydration   - Administer ordered medications as needed  - Encourage mobilization and activity  - Nutrition services referral to assist patient with appropriate food choices  - Assess stoma site  - Consider wound care consult   Outcome: Completed  Goal: Oral mucous membranes remain intact  Description: INTERVENTIONS  - Assess oral mucosa and hygiene practices  - Implement preventative oral hygiene regimen  - Implement oral medicated treatments as ordered  - Initiate Nutrition services referral as needed  Outcome: Completed     Problem: GENITOURINARY - ADULT  Goal: Maintains or returns to baseline urinary function  Description: INTERVENTIONS:  - Assess urinary function  - Encourage oral fluids to ensure adequate hydration if ordered  - Administer IV fluids as ordered to ensure adequate hydration  - Administer ordered medications as needed  - Offer frequent toileting  - Follow urinary retention protocol if ordered  Outcome: Completed  Goal: Absence of urinary retention  Description: INTERVENTIONS:  - Assess patient’s ability to void and empty bladder  - Monitor I/O  - Bladder scan as needed  - Discuss with  physician/AP medications to alleviate retention as needed  - Discuss catheterization for long term situations as appropriate  Outcome: Completed  Goal: Urinary catheter remains patent  Description: INTERVENTIONS:  - Assess patency of urinary catheter  - If patient has a chronic morales, consider changing catheter if non-functioning  - Follow guidelines for intermittent irrigation of non-functioning urinary catheter  Outcome: Completed     Problem: METABOLIC, FLUID AND ELECTROLYTES - ADULT  Goal: Electrolytes maintained within normal limits  Description: INTERVENTIONS:  - Monitor labs and assess patient for signs and symptoms of electrolyte imbalances  - Administer electrolyte replacement as ordered  - Monitor response to electrolyte replacements, including repeat lab results as appropriate  - Instruct patient on fluid and nutrition as appropriate  Outcome: Completed  Goal: Fluid balance maintained  Description: INTERVENTIONS:  - Monitor labs   - Monitor I/O and WT  - Instruct patient on fluid and nutrition as appropriate  - Assess for signs & symptoms of volume excess or deficit  Outcome: Completed  Goal: Glucose maintained within target range  Description: INTERVENTIONS:  - Monitor Blood Glucose as ordered  - Assess for signs and symptoms of hyperglycemia and hypoglycemia  - Administer ordered medications to maintain glucose within target range  - Assess nutritional intake and initiate nutrition service referral as needed  Outcome: Completed     Problem: Nutrition/Hydration-ADULT  Goal: Nutrient/Hydration intake appropriate for improving, restoring or maintaining nutritional needs  Description: Monitor and assess patient's nutrition/hydration status for malnutrition. Collaborate with interdisciplinary team and initiate plan and interventions as ordered.  Monitor patient's weight and dietary intake as ordered or per policy. Utilize nutrition screening tool and intervene as necessary. Determine patient's food  preferences and provide high-protein, high-caloric foods as appropriate.     INTERVENTIONS:  - Monitor oral intake, urinary output, labs, and treatment plans  - Assess nutrition and hydration status and recommend course of action  - Evaluate amount of meals eaten  - Assist patient with eating if necessary   - Allow adequate time for meals  - Recommend/ encourage appropriate diets, oral nutritional supplements, and vitamin/mineral supplements  - Order, calculate, and assess calorie counts as needed  - Recommend, monitor, and adjust tube feedings and TPN/PPN based on assessed needs  - Assess need for intravenous fluids  - Provide specific nutrition/hydration education as appropriate  - Include patient/family/caregiver in decisions related to nutrition  Outcome: Completed

## 2024-09-06 NOTE — DISCHARGE SUMMARY
[facility]  Discharge- Dee Dee Shaffer 1960, 63 y.o. female MRN: [id number]  Unit/Bed#: E4 -01 Encounter: [id number]  Primary Care Provider: CHERELLE Franklin   Date and time admitted to hospital: 8/30/2024  2:04 PM    Blister of foot  Assessment & Plan  Left foot blister, podiatry was consulted and drained  Local wound care recommended    Bacteriuria  Assessment & Plan  History of ESRD nonoliguric, with chronic Camargo catheter  UA did grow Pseudomonas Enterobacter, suspect likely colonization without any urinary symptoms  Patient requested to check UA, it is abnormal.  The patient denies any urinary symptoms  Would not recommend treating with antibiotics at this time    COVID-19  Assessment & Plan  Suspect this is contributing to admitting symptoms, possibly complicated by myocarditis  Completed 3 days of remdesivir  Currently improving, on room air    Hypertension  Assessment & Plan  Continue home blood pressure medications Imdur 30 mg once daily, losartan 25 mg daily and added Toprol-XL 12.5 mg daily  Discontinue hydralazine on discharge    Chronic obstructive pulmonary disease, unspecified COPD type (HCC)  Assessment & Plan  Not currently in exacerbation    NSTEMI type I   Assessment & Plan  Significantly elevated troponin without chest pain  CAD s/p PCI to ostial LCX 9/4/24  s/p cath w/ JANET-mLCx, moderate diffuse LAD and 95% small RCA (treated medically)  Continue aspirin, statin and Plavix    Coronary artery disease with stable angina pectoris (HCC)  Assessment & Plan  History of coronary artery disease with multiple coronary artery stents  Continue medical management with Plavix, statin, imdur  2D echo showing EF of 23% with severe global hypokinesis, regional variation wall motion abnormality  S/p cardiac cath requiring intervention PCI of ostial left circumflex  Continue aspirin Plavix and statin  Recommend cardiac rehab on discharge  Follow-up with  cardiology outpatient    Mixed hyperlipidemia  Assessment & Plan  Continue statin therapy    Anemia due to chronic kidney disease, on chronic dialysis ([facility])  Assessment & Plan  Hemoglobin currently stable    Neurogenic bladder  Assessment & Plan  Chronic Camargo catheter    ESRD (end stage renal disease) ([facility])  Assessment & Plan  Lab Results   Component Value Date    EGFR 9 09/05/2024    EGFR 11 09/04/2024    EGFR 8 09/03/2024    CREATININE 4.76 (H) 09/05/2024    CREATININE 3.98 (H) 09/04/2024    CREATININE 5.00 (H) 09/03/2024     Appreciate nephrology recommendations    Type 2 diabetes mellitus with chronic kidney disease on chronic dialysis, with long-term current use of insulin ([facility])  Assessment & Plan  Recommend decreased dose of home insulin regimen  Would recommend Tresiba 20 units once a day, Humalog 5 units 3 times daily with meals  Monitor blood sugars, follow-up with PCP as patient will likely need further insulin titration    * Chronic Heart Failure  Assessment & Plan  Wt Readings from Last 3 Encounters:   09/06/24 110 kg (243 lb 2.7 oz)   08/19/24 108 kg (237 lb)   08/02/24 109 kg (240 lb)     Patient reports generalized weakness.  Suspect volume overload secondary to increased fluid intake  Continue Lasix 160mg po  Continue volume removal with hemodialysis  Patient symptoms appear to improve, currently on room air.  Denies any shortness of breath        Discharging Physician / Practitioner: Jacob Monk MD  PCP: CHERELLE Franklin  Admission Date:   Admission Orders (From admission, onward)       Ordered        08/30/24 1720  INPATIENT ADMISSION  Once                          Discharge Date: 09/06/24    Medical Problems       Resolved Problems  Date Reviewed: 9/6/2024   None         Consultations During Hospital Stay:  Cardiology  Nephrology  Podiatry    Procedures Performed:   Cardiac Cath  Impression         Dist LM lesion is 40% stenosed.    Ost Cx lesion is 85% stenosed.    Prox RCA lesion  is 90% stenosed.    1st Mrg lesion is 60% stenosed.    Prox Cx to Mid Cx lesion is 50% stenosed.    2nd LPL lesion is 60% stenosed.    Mid LAD lesion is 10% stenosed.     Patent LAD stents  Patent LCX stent (1 focal stenosis 50%, 6mm2)  Severe ostial LCX (EDDIE 1.9mm2)/Intervened on  dLM disease 40% (area 8.8 mm2, 55% by IVUS area)    2D Echo  Interpretation Summary  Show Result Comparison     Left Ventricle: Left ventricular cavity size is mildly dilated. There is mild concentric hypertrophy. The left ventricular ejection fraction is 23% by visual estimation. Systolic function is severely reduced. There is severe global hypokinesis with regional variation and somewhat paradoxical wall motion consistent with conduction abnormality. Diastolic function is moderately abnormal, consistent with grade II (pseudonormal) relaxation.  Left atrial filling pressure is elevated.    Right Ventricle: Right ventricular cavity size is mildly dilated. Systolic function is normal.    Left Atrium: The atrium is moderately dilated.    Right Atrium: The atrium is mildly dilated.    Aortic Valve: There is aortic valve sclerosis.    Mitral Valve: There is mild annular calcification. There is mild to moderate regurgitation.    Tricuspid Valve: There is mild regurgitation. Pulmonary artery systolic pressures are estimated at 49 mmHg.    Compared to report from June 29, 2022, there is further reduction in left ventricular systolic function with mild to moderate mitral regurgitation and mild pulmonary hypertension.    Significant Findings / Test Results:   XR chest 2 views    Result Date: 8/30/2024  Impression: Small bilateral pleural effusions and mild to moderate pulmonary interstitial edema. Workstation performed: [id number]        Incidental Findings:   none     Test Results Pending at Discharge (will require follow up):   none     Outpatient Tests Requested:  none    Complications:  none    Reason for Admission: Weakness, SOB    Hospital  "Course:     Dee Dee Shaffer is a 63 y.o. female patient who originally presented to the hospital on 8/30/2024 due to weakness and shortness of breath.  Upon evaluation in the ED, she was noted to be volume overloaded with increased weight gain.  Troponins were elevated.  Treated for acute heart failure.  Nephrology was consulted as patient has ESRD, volume was managed with dialysis.  2D echo completed did show some wall motion abnormality.  Cardiology was consulted, recommended cardiac cath.  With elevated troponins, she was started on heparin drip and underwent cardiac cath.  Noted to have lesion involving her left ostial circumflex requiring PCI.  Patient's chest pain and shortness breath had resolved.  Her volume had improved back to her baseline per nephrology.  Cardiology cleared for discharge, she is recommend to continue her home medications aspirin, Plavix and added Toprol-XL 12.5 mg.  Follow-up with cardiology outpatient, cardiac referral given on discharge.    Patient was diagnosed with COVID, fairly asymptomatic and had been on room air.  Completed 3 days of remdesivir.    Please see above list of diagnoses and related plan for additional information.     Condition at Discharge: fair     Discharge Day Visit / Exam:     Subjective:    Reports doing well today.  Denies any chest pain or shortness of breath.  Tolerant diet without any nausea or vomiting.  Vitals: Blood Pressure: 147/66 (09/06/24 0810)  Pulse: 78 (09/06/24 0810)  Temperature: (!) 97.1 °F (36.2 °C) (09/06/24 0810)  Temp Source: Temporal (09/06/24 0810)  Respirations: 18 (09/06/24 0810)  Height: 5' 7\" (170.2 cm) (09/02/24 1135)  Weight - Scale: 110 kg (243 lb 2.7 oz) (09/06/24 0600)  SpO2: 95 % (09/06/24 0810)  Exam:   Physical Exam  Vitals and nursing note reviewed.   Constitutional:       Appearance: She is obese.   HENT:      Head: Normocephalic.   Eyes:      Conjunctiva/sclera: Conjunctivae normal.   Cardiovascular:      Rate and Rhythm: " Normal rate.   Pulmonary:      Effort: Pulmonary effort is normal. No respiratory distress.   Abdominal:      General: Bowel sounds are normal. There is no distension.      Palpations: Abdomen is soft.   Musculoskeletal:         General: No swelling.      Right lower leg: No edema.      Left lower leg: No edema.   Skin:     General: Skin is warm and dry.   Neurological:      General: No focal deficit present.      Mental Status: She is alert. Mental status is at baseline.         Discharge instructions/Information to patient and family:   See after visit summary for information provided to patient and family.      Provisions for Follow-Up Care:  See after visit summary for information related to follow-up care and any pertinent home health orders.      Disposition:     Home     Discharge Statement:  I spent 40 minutes discharging the patient. This time was spent on the day of discharge. I had direct contact with the patient on the day of discharge. Greater than 50% of the total time was spent examining patient, answering all patient questions, arranging and discussing plan of care with patient as well as directly providing post-discharge instructions.  Additional time then spent on discharge activities.    Discharge Medications:  See after visit summary for reconciled discharge medications provided to patient and family.      ** Please Note: This note has been constructed using a voice recognition system **

## 2024-09-06 NOTE — ASSESSMENT & PLAN NOTE
History of ESRD nonoliguric, with chronic Camargo catheter  UA did grow Pseudomonas Enterobacter, suspect likely colonization without any urinary symptoms  Patient requested to check UA, it is abnormal.  The patient denies any urinary symptoms  Would not recommend treating with antibiotics at this time

## 2024-09-06 NOTE — PROGRESS NOTES
NEPHROLOGY PROGRESS NOTE   Dee Dee Shaffer 63 y.o. female MRN: [id number]  Unit/Bed#: E4 -01 Encounter: [id number]      ASSESSMENT & PLAN:  1 ESRD on HD TTS at [facility].  Dry weight was recently adjusted to 108 kg.  Last dialysis treatment yesterday.  Patient is stable from kidney standpoint of view to be discharged as long as okay with other specialties and then continue with dialysis tomorrow at her outpatient unit    2.  Shortness of breath, volume overload, COVID.  Clinically improved after UF on dialysis.  COVID management as per primary team    #3 hypertension with intradialytic hypotension, continue with midodrine pre dialysis, blood pressure overall is stable    4 anemia chronic kidney disease, Mircera and Venofer as an outpatient, monitor H&H and recommend blood transfusion for hemoglobin less than 7, hemoglobin low but is stable at 8.2 on 09/5    #5 mineral bone disease, continue with renal diet and binders with meals    6.  Ischemic cardiomyopathy, elevated troponin with precordial chest pain, history of CAD status post cath and PCI, cardiology on board.  Status post cardiac cath with PCI on 9/4    #7 Access, left upper extremity AV fistula in place      Stable from kidney standpoint to be to be discharged as long as okay with other specialties and continue with dialysis tomorrow at her outpatient unit    SUBJECTIVE:  Patient seen and examined, in general feeling better, denies any chest pain or shortness of breath, no nausea, no vomiting, no abdominal pain, no issues with dialysis yesterday      OBJECTIVE:  Current Weight: Weight - Scale: 110 kg (243 lb 2.7 oz)  Vitals:    09/06/24 0810   BP: 147/66   Pulse: 78   Resp: 18   Temp: (!) 97.1 °F (36.2 °C)   SpO2: 95%       Intake/Output Summary (Last 24 hours) at 9/6/2024 1026  Last data filed at 9/6/2024 0901  Gross per 24 hour   Intake 1180 ml   Output 3500 ml   Net -2320 ml       General: Morbidly obese, cooperative, in not acute  distress  Eyes: conjunctivae pink, anicteric sclerae  ENT: lips and mucous membranes moist  Neck: supple, no JVD  Chest: clear breath sounds bilateral, no crackles, ronchus or wheezings  CVS: distinct S1 & S2, normal rate, regular rhythm  Abdomen: soft, non-tender, non-distended, normoactive bowel sounds  Extremities: no significant edema of both legs  Skin: no rash  Neuro: awake, alert, oriented      Medications:    Current Facility-Administered Medications:     acetaminophen (TYLENOL) tablet 650 mg, 650 mg, Oral, Q4H PRN, Jacob Monk MD    allopurinol (ZYLOPRIM) tablet 200 mg, 200 mg, Oral, Daily, Jacob Monk MD, 200 mg at 09/06/24 0847    aspirin (ECOTRIN LOW STRENGTH) EC tablet 81 mg, 81 mg, Oral, Daily, Jacob Monk MD, 81 mg at 09/06/24 0847    atorvastatin (LIPITOR) tablet 80 mg, 80 mg, Oral, Daily, Jacob Monk MD, 80 mg at 09/06/24 0847    citalopram (CeleXA) tablet 40 mg, 40 mg, Oral, Daily, Jacob Monk MD, 40 mg at 09/06/24 0847    clopidogrel (PLAVIX) tablet 75 mg, 75 mg, Oral, Daily, Jacob Monk MD, 75 mg at 09/06/24 0847    docusate sodium (COLACE) capsule 100 mg, 100 mg, Oral, BID, Jacob Monk MD, 100 mg at 09/06/24 0847    ergocalciferol (VITAMIN D2) capsule 50,000 Units, 50,000 Units, Oral, Once per day on Monday Wednesday Friday, Jacob Monk MD, 50,000 Units at 09/06/24 0934    furosemide (LASIX) tablet 160 mg, 160 mg, Oral, Daily, Jacob Monk MD, 160 mg at 09/06/24 0848    heparin (porcine) subcutaneous injection 5,000 Units, 5,000 Units, Subcutaneous, Q8H [facility], Jacob Monk MD, 5,000 Units at 09/06/24 0657    insulin glargine (LANTUS) subcutaneous injection 20 Units 0.2 mL, 20 Units, Subcutaneous, QAM, Jacob Monk MD, 20 Units at 09/06/24 0841    insulin lispro (HumALOG/ADMELOG) 100 units/mL subcutaneous injection 1-6 Units, 1-6 Units, Subcutaneous, 4x Daily (AC & HS), 1 Units at 09/06/24 0845 **AND** Fingerstick Glucose (POCT), , , 4x Daily AC and at  bedtime, Jacob Monk MD    isosorbide mononitrate (IMDUR) 24 hr tablet 30 mg, 30 mg, Oral, QPM, Jacob Monk MD, 30 mg at 09/05/24 1722    lactulose (CHRONULAC) oral solution 20 g, 20 g, Oral, Daily PRN, Jacob Monk MD    levothyroxine tablet 50 mcg, 50 mcg, Oral, Daily Before Breakfast, Jacob Monk MD, 50 mcg at 09/06/24 0657    midodrine (PROAMATINE) tablet 5 mg, 5 mg, Oral, Before Dialysis, Jacob Monk MD, 5 mg at 09/03/24 0945    OLANZapine (ZyPREXA) tablet 5 mg, 5 mg, Oral, HS, Jacob Monk MD, 5 mg at 09/05/24 2153    ondansetron (ZOFRAN) injection 4 mg, 4 mg, Intravenous, Q6H PRN, Jacob Monk MD    oxyCODONE (ROXICODONE) immediate release tablet 10 mg, 10 mg, Oral, Q4H PRN, Jacob Monk MD, 10 mg at 09/04/24 2147    oxyCODONE-acetaminophen (PERCOCET) 5-325 mg per tablet 1 tablet, 1 tablet, Oral, Q4H PRN, Jacob Monk MD, 1 tablet at 09/05/24 2157    pantoprazole (PROTONIX) EC tablet 40 mg, 40 mg, Oral, Early Morning, Jacob Monk MD, 40 mg at 09/06/24 0657    senna-docusate sodium (SENOKOT S) 8.6-50 mg per tablet 1 tablet, 1 tablet, Oral, Daily, Jacob Monk MD, 1 tablet at 09/06/24 0847    sevelamer (RENAGEL) tablet 1,600 mg, 1,600 mg, Oral, TID With Meals, Jacob Monk MD, 1,600 mg at 09/06/24 0850    Invasive Devices:        Lab Results:   Results from last 7 days   Lab Units 09/05/24  0855 09/04/24  0228 09/03/24  0512 09/02/24  0434 09/01/24  0551 08/31/24  0545 08/31/24  0023 08/30/24  1528   WBC Thousand/uL 9.98 10.39* 8.89   < > 8.13 7.31   < > 8.02   HEMOGLOBIN g/dL 8.2* 9.7* 8.8*   < > 8.2* 8.3*   < > 9.6*   HEMATOCRIT % 24.7* 29.8* 26.9*   < > 24.6* 24.8*   < > 28.1*   PLATELETS Thousands/uL 253 283 242   < > 221 206   < > 225   SODIUM mmol/L 132* 134* 133*   < > 135 132*  --  131*   POTASSIUM mmol/L 4.3 5.5* 3.5   < > 3.6 3.7  --  3.9   CHLORIDE mmol/L 98 97 96   < > 96 92*  --  90*   CO2 mmol/L 27 26 26   < > 28 25  --  23   BUN mg/dL 40* 36* 51*   < > 34*  "55*  --  50*   CREATININE mg/dL 4.76* 3.98* 5.00*   < > 3.74* 5.19*  --  4.56*   CALCIUM mg/dL 8.5 8.4 8.2*   < > 7.9* 8.0*  --  8.4   MAGNESIUM mg/dL  --   --   --   --  2.2 2.7  --   --    PHOSPHORUS mg/dL  --   --   --   --   --  5.2*  --   --    ALK PHOS U/L  --   --   --   --   --  85  --  93   ALT U/L  --   --   --   --   --  140*  --  91*   AST U/L  --   --   --   --   --  223*  --  188*    < > = values in this interval not displayed.           Portions of the record may have been created with voice recognition software. Occasional wrong word or \"sound a like\" substitutions may have occurred due to the inherent limitations of voice recognition software. Read the chart carefully and recognize, using context, where substitutions have occurred.If you have any questions, please contact the dictating provider.  "

## 2024-09-06 NOTE — NURSING NOTE
Patient has been discharged, instructions reviewed and expressed understanding.. Stent information and heart failure booklet also reviewed., medications to [facility]. Accompanied off unit by family and RN via wheelchair.

## 2024-09-06 NOTE — ASSESSMENT & PLAN NOTE
History of coronary artery disease with multiple coronary artery stents  Continue medical management with Plavix, statin, imdur  2D echo showing EF of 23% with severe global hypokinesis, regional variation wall motion abnormality  S/p cardiac cath requiring intervention PCI of ostial left circumflex  Continue aspirin Plavix and statin  Recommend cardiac rehab on discharge  Follow-up with cardiology outpatient

## 2024-09-06 NOTE — PLAN OF CARE
Problem: Potential for Falls  Goal: Patient will remain free of falls  Description: INTERVENTIONS:  - Educate patient/family on patient safety including physical limitations  - Instruct patient to call for assistance with activity   - Consult OT/PT to assist with strengthening/mobility   - Keep Call bell within reach  - Keep bed low and locked with side rails adjusted as appropriate  - Keep care items and personal belongings within reach  - Initiate and maintain comfort rounds  - Make Fall Risk Sign visible to staff  - Offer Toileting every 2 Hours, in advance of need  - Initiate/Maintain bed alarm  - Obtain necessary fall risk management equipment:   - Apply yellow socks and bracelet for high fall risk patients  - Consider moving patient to room near nurses station  Outcome: Progressing     Problem: Prexisting or High Potential for Compromised Skin Integrity  Goal: Skin integrity is maintained or improved  Description: INTERVENTIONS:  - Identify patients at risk for skin breakdown  - Assess and monitor skin integrity  - Assess and monitor nutrition and hydration status  - Monitor labs   - Assess for incontinence   - Turn and reposition patient  - Assist with mobility/ambulation  - Relieve pressure over bony prominences  - Avoid friction and shearing  - Provide appropriate hygiene as needed including keeping skin clean and dry  - Evaluate need for skin moisturizer/barrier cream  - Collaborate with interdisciplinary team   - Patient/family teaching  - Consider wound care consult   Outcome: Progressing     Problem: PAIN - ADULT  Goal: Verbalizes/displays adequate comfort level or baseline comfort level  Description: Interventions:  - Encourage patient to monitor pain and request assistance  - Assess pain using appropriate pain scale  - Administer analgesics based on type and severity of pain and evaluate response  - Implement non-pharmacological measures as appropriate and evaluate response  - Consider cultural and  social influences on pain and pain management  - Notify physician/advanced practitioner if interventions unsuccessful or patient reports new pain  Outcome: Progressing     Problem: INFECTION - ADULT  Goal: Absence or prevention of progression during hospitalization  Description: INTERVENTIONS:  - Assess and monitor for signs and symptoms of infection  - Monitor lab/diagnostic results  - Monitor all insertion sites, i.e. indwelling lines, tubes, and drains  - Monitor endotracheal if appropriate and nasal secretions for changes in amount and color  - [place] appropriate cooling/warming therapies per order  - Administer medications as ordered  - Instruct and encourage patient and family to use good hand hygiene technique  - Identify and instruct in appropriate isolation precautions for identified infection/condition  Outcome: Progressing  Goal: Absence of fever/infection during neutropenic period  Description: INTERVENTIONS:  - Monitor WBC    Outcome: Progressing     Problem: SAFETY ADULT  Goal: Patient will remain free of falls  Description: INTERVENTIONS:  - Educate patient/family on patient safety including physical limitations  - Instruct patient to call for assistance with activity   - Consult OT/PT to assist with strengthening/mobility   - Keep Call bell within reach  - Keep bed low and locked with side rails adjusted as appropriate  - Keep care items and personal belongings within reach  - Initiate and maintain comfort rounds  - Make Fall Risk Sign visible to staff  - Offer Toileting every 2 Hours, in advance of need  - Initiate/Maintain bed alarm  - Obtain necessary fall risk management equipment:   - Apply yellow socks and bracelet for high fall risk patients  - Consider moving patient to room near nurses station  Outcome: Progressing  Goal: Maintain or return to baseline ADL function  Description: INTERVENTIONS:  -  Assess patient's ability to carry out ADLs; assess patient's baseline for ADL function and  identify physical deficits which impact ability to perform ADLs (bathing, care of mouth/teeth, toileting, grooming, dressing, etc.)  - Assess/evaluate cause of self-care deficits   - Assess range of motion  - Assess patient's mobility; develop plan if impaired  - Assess patient's need for assistive devices and provide as appropriate  - Encourage maximum independence but intervene and supervise when necessary  - Involve family in performance of ADLs  - Assess for home care needs following discharge   - Consider OT consult to assist with ADL evaluation and planning for discharge  - Provide patient education as appropriate  Outcome: Progressing  Goal: Maintains/Returns to pre admission functional level  Description: INTERVENTIONS:  - Perform AM-PAC 6 Click Basic Mobility/ Daily Activity assessment daily.  - Set and communicate daily mobility goal to care team and patient/family/caregiver.   - Collaborate with rehabilitation services on mobility goals if consulted  - Perform Range of Motion 3 times a day.  - Reposition patient every 2 hours.  - Dangle patient 3 times a day  - Stand patient 3 times a day  - Ambulate patient 3 times a day  - Out of bed to chair 3 times a day   - Out of bed for meals 3 times a day  - Out of bed for toileting  - Record patient progress and toleration of activity level   Outcome: Progressing     Problem: DISCHARGE PLANNING  Goal: Discharge to home or other facility with appropriate resources  Description: INTERVENTIONS:  - Identify barriers to discharge w/patient and caregiver  - Arrange for needed discharge resources and transportation as appropriate  - Identify discharge learning needs (meds, wound care, etc.)  - Arrange for interpretive services to assist at discharge as needed  - Refer to Case Management Department for coordinating discharge planning if the patient needs post-hospital services based on physician/advanced practitioner order or complex needs related to functional status,  cognitive ability, or social support system  Outcome: Progressing     Problem: Knowledge Deficit  Goal: Patient/family/caregiver demonstrates understanding of disease process, treatment plan, medications, and discharge instructions  Description: Complete learning assessment and assess knowledge base.  Interventions:  - Provide teaching at level of understanding  - Provide teaching via preferred learning methods  Outcome: Progressing     Problem: CARDIOVASCULAR - ADULT  Goal: Maintains optimal cardiac output and hemodynamic stability  Description: INTERVENTIONS:  - Monitor I/O, vital signs and rhythm  - Monitor for S/S and trends of decreased cardiac output  - Administer and titrate ordered vasoactive medications to optimize hemodynamic stability  - Assess quality of pulses, skin color and temperature  - Assess for signs of decreased coronary artery perfusion  - Instruct patient to report change in severity of symptoms  Outcome: Progressing  Goal: Absence of cardiac dysrhythmias or at baseline rhythm  Description: INTERVENTIONS:  - Continuous cardiac monitoring, vital signs, obtain 12 lead EKG if ordered  - Administer antiarrhythmic and heart rate control medications as ordered  - Monitor electrolytes and administer replacement therapy as ordered  Outcome: Progressing     Problem: RESPIRATORY - ADULT  Goal: Achieves optimal ventilation and oxygenation  Description: INTERVENTIONS:  - Assess for changes in respiratory status  - Assess for changes in mentation and behavior  - Position to facilitate oxygenation and minimize respiratory effort  - Oxygen administered by appropriate delivery if ordered  - Initiate smoking cessation education as indicated  - Encourage broncho-pulmonary hygiene including cough, deep breathe, Incentive Spirometry  - Assess the need for suctioning and aspirate as needed  - Assess and instruct to report SOB or any respiratory difficulty  - Respiratory Therapy support as indicated  Outcome:  Progressing     Problem: GASTROINTESTINAL - ADULT  Goal: Minimal or absence of nausea and/or vomiting  Description: INTERVENTIONS:  - Administer IV fluids if ordered to ensure adequate hydration  - Maintain NPO status until nausea and vomiting are resolved  - Nasogastric tube if ordered  - Administer ordered antiemetic medications as needed  - Provide nonpharmacologic comfort measures as appropriate  - Advance diet as tolerated, if ordered  - Consider nutrition services referral to assist patient with adequate nutrition and appropriate food choices  Outcome: Progressing  Goal: Maintains or returns to baseline bowel function  Description: INTERVENTIONS:  - Assess bowel function  - Encourage oral fluids to ensure adequate hydration  - Administer IV fluids if ordered to ensure adequate hydration  - Administer ordered medications as needed  - Encourage mobilization and activity  - Consider nutritional services referral to assist patient with adequate nutrition and appropriate food choices  Outcome: Progressing  Goal: Maintains adequate nutritional intake  Description: INTERVENTIONS:  - Monitor percentage of each meal consumed  - Identify factors contributing to decreased intake, treat as appropriate  - Assist with meals as needed  - Monitor I&O, weight, and lab values if indicated  - Obtain nutrition services referral as needed  Outcome: Progressing  Goal: Establish and maintain optimal ostomy function  Description: INTERVENTIONS:  - Assess bowel function  - Encourage oral fluids to ensure adequate hydration  - Administer IV fluids if ordered to ensure adequate hydration   - Administer ordered medications as needed  - Encourage mobilization and activity  - Nutrition services referral to assist patient with appropriate food choices  - Assess stoma site  - Consider wound care consult   Outcome: Progressing  Goal: Oral mucous membranes remain intact  Description: INTERVENTIONS  - Assess oral mucosa and hygiene practices  -  Implement preventative oral hygiene regimen  - Implement oral medicated treatments as ordered  - Initiate Nutrition services referral as needed  Outcome: Progressing     Problem: GENITOURINARY - ADULT  Goal: Maintains or returns to baseline urinary function  Description: INTERVENTIONS:  - Assess urinary function  - Encourage oral fluids to ensure adequate hydration if ordered  - Administer IV fluids as ordered to ensure adequate hydration  - Administer ordered medications as needed  - Offer frequent toileting  - Follow urinary retention protocol if ordered  Outcome: Progressing  Goal: Absence of urinary retention  Description: INTERVENTIONS:  - Assess patient’s ability to void and empty bladder  - Monitor I/O  - Bladder scan as needed  - Discuss with physician/AP medications to alleviate retention as needed  - Discuss catheterization for long term situations as appropriate  Outcome: Progressing  Goal: Urinary catheter remains patent  Description: INTERVENTIONS:  - Assess patency of urinary catheter  - If patient has a chronic morales, consider changing catheter if non-functioning  - Follow guidelines for intermittent irrigation of non-functioning urinary catheter  Outcome: Progressing     Problem: METABOLIC, FLUID AND ELECTROLYTES - ADULT  Goal: Electrolytes maintained within normal limits  Description: INTERVENTIONS:  - Monitor labs and assess patient for signs and symptoms of electrolyte imbalances  - Administer electrolyte replacement as ordered  - Monitor response to electrolyte replacements, including repeat lab results as appropriate  - Instruct patient on fluid and nutrition as appropriate  Outcome: Progressing  Goal: Fluid balance maintained  Description: INTERVENTIONS:  - Monitor labs   - Monitor I/O and WT  - Instruct patient on fluid and nutrition as appropriate  - Assess for signs & symptoms of volume excess or deficit  Outcome: Progressing  Goal: Glucose maintained within target range  Description:  INTERVENTIONS:  - Monitor Blood Glucose as ordered  - Assess for signs and symptoms of hyperglycemia and hypoglycemia  - Administer ordered medications to maintain glucose within target range  - Assess nutritional intake and initiate nutrition service referral as needed  Outcome: Progressing     Problem: Nutrition/Hydration-ADULT  Goal: Nutrient/Hydration intake appropriate for improving, restoring or maintaining nutritional needs  Description: Monitor and assess patient's nutrition/hydration status for malnutrition. Collaborate with interdisciplinary team and initiate plan and interventions as ordered.  Monitor patient's weight and dietary intake as ordered or per policy. Utilize nutrition screening tool and intervene as necessary. Determine patient's food preferences and provide high-protein, high-caloric foods as appropriate.     INTERVENTIONS:  - Monitor oral intake, urinary output, labs, and treatment plans  - Assess nutrition and hydration status and recommend course of action  - Evaluate amount of meals eaten  - Assist patient with eating if necessary   - Allow adequate time for meals  - Recommend/ encourage appropriate diets, oral nutritional supplements, and vitamin/mineral supplements  - Order, calculate, and assess calorie counts as needed  - Recommend, monitor, and adjust tube feedings and TPN/PPN based on assessed needs  - Assess need for intravenous fluids  - Provide specific nutrition/hydration education as appropriate  - Include patient/family/caregiver in decisions related to nutrition  Outcome: Progressing

## 2024-09-06 NOTE — ASSESSMENT & PLAN NOTE
Recommend decreased dose of home insulin regimen  Would recommend Tresiba 20 units once a day, Humalog 5 units 3 times daily with meals  Monitor blood sugars, follow-up with PCP as patient will likely need further insulin titration

## 2024-09-06 NOTE — ASSESSMENT & PLAN NOTE
Significantly elevated troponin without chest pain  CAD s/p PCI to ostial LCX 9/4/24  s/p cath w/ [id number], moderate diffuse LAD and 95% small RCA (treated medically)  Continue aspirin, statin and Plavix

## 2024-09-06 NOTE — PROGRESS NOTES
I received a call from the patient who is currently IP. Her discharge is being planned for later today.  She is requesting her pain med refill before she leaves to [place]; sent to PCP.  Patient will be picking up the medication; call placed to pharmacy to inform them not to deliver.      The patient stated she will be returning tomorrow for her last dialysis session here before her care is transferred to [place].  The patient noted she is willing to return for a TCM appointment.    I will follow up next week once the patient is discharged.

## 2024-09-07 ENCOUNTER — TELEPHONE (OUTPATIENT)
Dept: OTHER | Facility: HOSPITAL | Age: 64
End: 2024-09-07

## 2024-09-07 NOTE — TELEPHONE ENCOUNTER
Please call and offer this patient an appointment with us to follow up on the blister on her foot in the next week.  May force on.

## 2024-09-09 ENCOUNTER — TELEPHONE (OUTPATIENT)
Dept: CARDIOLOGY CLINIC | Facility: CLINIC | Age: 64
End: 2024-09-09

## 2024-09-09 ENCOUNTER — PATIENT OUTREACH (OUTPATIENT)
Dept: FAMILY MEDICINE CLINIC | Facility: CLINIC | Age: 64
End: 2024-09-09

## 2024-09-09 ENCOUNTER — TELEPHONE (OUTPATIENT)
Dept: UROLOGY | Facility: CLINIC | Age: 64
End: 2024-09-09

## 2024-09-09 ENCOUNTER — TELEPHONE (OUTPATIENT)
Dept: GASTROENTEROLOGY | Facility: MEDICAL CENTER | Age: 64
End: 2024-09-09

## 2024-09-09 DIAGNOSIS — Z79.4 TYPE 2 DIABETES MELLITUS WITH DIABETIC POLYNEUROPATHY, WITH LONG-TERM CURRENT USE OF INSULIN (HCC): ICD-10-CM

## 2024-09-09 DIAGNOSIS — E11.42 TYPE 2 DIABETES MELLITUS WITH DIABETIC POLYNEUROPATHY, WITH LONG-TERM CURRENT USE OF INSULIN (HCC): ICD-10-CM

## 2024-09-09 DIAGNOSIS — I10 PRIMARY HYPERTENSION: ICD-10-CM

## 2024-09-09 DIAGNOSIS — Z79.4 CURRENT USE OF INSULIN (HCC): ICD-10-CM

## 2024-09-09 DIAGNOSIS — E03.9 HYPOTHYROIDISM, UNSPECIFIED TYPE: ICD-10-CM

## 2024-09-09 DIAGNOSIS — I25.10 CORONARY ARTERY DISEASE INVOLVING NATIVE HEART WITHOUT ANGINA PECTORIS, UNSPECIFIED VESSEL OR LESION TYPE: ICD-10-CM

## 2024-09-09 DIAGNOSIS — K21.9 GASTROESOPHAGEAL REFLUX DISEASE WITHOUT ESOPHAGITIS: ICD-10-CM

## 2024-09-09 DIAGNOSIS — I50.9 ACUTE DECOMPENSATED HEART FAILURE (HCC): ICD-10-CM

## 2024-09-09 DIAGNOSIS — K59.03 DRUG-INDUCED CONSTIPATION: ICD-10-CM

## 2024-09-09 DIAGNOSIS — Z71.89 COORDINATION OF COMPLEX CARE: Primary | ICD-10-CM

## 2024-09-09 DIAGNOSIS — N18.32 STAGE 3B CHRONIC KIDNEY DISEASE (HCC): ICD-10-CM

## 2024-09-09 RX ORDER — FUROSEMIDE 80 MG
160 TABLET ORAL DAILY
Qty: 180 TABLET | Refills: 3 | Status: SHIPPED | OUTPATIENT
Start: 2024-09-09

## 2024-09-09 NOTE — TELEPHONE ENCOUNTER
Voicemail left for the patient asking to please call the office to schedule an appointment for SPT change.    Received incoming call from Martha Mckeon RN earlier today asking if I could place a call to the patient to schedule an appointment.

## 2024-09-09 NOTE — TELEPHONE ENCOUNTER
Spoke with patient.    Self-management/education and teach back:  Primary learner: self  Following low sodium diet: following  Following fluid restriction: 32 oz  Hospital discharge weight: 243 lbs  Weighing daily: not yet, has moved and has not unpacked scale yet. Goes to dialysis Tues Thurs Sat.               Recording: no  1st home weight         Weight today:  Monitoring symptoms: yes  Any current symptoms: feeling weak from COVID and being hospitalized, has swelling of her legs, about the same as when hospitalized. States she has left foot blisters, was seeing podiatry in the hospital  Knows when to call provider: yes  Medication reviewed and taking all as prescribed (bring medications to follow up visits): reviewed all cardiac medications, patient was taking whole tablet of metoprolol, advised she should only be taking 1/2 tablet. States she will start tomorrow.  Knows name of diuretic: yes, patient states she has only been taking lasix 80 mg daily, even before admission. When reviewing chart, looks like dose was changed to 160 mg daily 1/23/24. Patient has blister pack and instructions say 80 mg daily. Spoke with pharmacist, states they received a new order 7/22/24 for increased dose, but didn't update pill pack, then states patient was admitted. Messaged DR Kuo (nephrologist) for direction.   Any labs/studies needed for follow up: NA  Escalation plan: reviewed  HF education reviewed/reinforced including low sodium diet, 64 oz fluid restriction, activity, symptoms of decompensation and when and who to call.     Care Coordination:  Aware of cardiology follow up appointment: 9/16/24  Aware of PCP follow up appointment: 9/12/24  Transportation: family  Social Support: family  Home health care: not yet  Health literacy: good  Engagement: good  Personal Goal: prevent rehospitalization  Additional comments: ok for weekly calls

## 2024-09-09 NOTE — TELEPHONE ENCOUNTER
Patient Calls  (Newest Message First)  View All Conversations on this Encounter  Martha Mckeon RN  G[facility] [place] Clerical; Cardiology Janina Provider; Madhuri RomeeCHERELLE15 minutes ago (12:12 PM)     SHAHLA  GI-please cancel pt's 9/20 appt as she moved out of the area.  Thanks.      Ирина LLANOS    Madhuri-pt awaiting med refill.  Thank you.     Martha Mckeon RN15 minutes ago (12:11 PM)     SHAHLA  ADT alert received.  IB received re HF  Patient.     I received a call from the patient asking for her pain meds to be refilled; they have not yet been signed off by PCP.     The patient reports she is feeling better since her hospital discharge last week.  She states she continues with chronic pain and is still slightly weak recovering from Covid and HF exacerbation.  The patient denies chest pain or shortness of breath.  She and daughter Yenny notes the patient has slight edema of her legs.  Yenny will look for their scale (as they are unpacking from their move).  The patient believes her weight at dialysis on Saturday was 240.  Yenny notes the patient's appetite has increased.     The patient states she had dialysis Saturday and is scheduled for her next session tomorrow at [facility] (staying on the same schedule, [facility]).     The patient states she is overdue for a catheter exchange; note sent to [facility].  They will call to schedule.  The patient approved for me to cancel her DEXA and colonoscopy (called Central Scheduling and note sent to GI).     The patient is requesting to see PCP one last time before transferring care to a provider close to her home; note sent to clerical.  I will continue to follow until she finds a provider.  She is aware of her Cards appointment 9/16.      Chart reviewed.                  Note        Martha Mckeon RN  central sched34 minutes ago (11:52 AM)     KEVIN Sagastume Brenda Jane [phone] hour ago (11:22 AM)     Dee Dee Shaffer [phone]  Martha  Mike RN1 hour ago (11:14 AM)     ADT  Martha Mckeon RN3 days ago     Recent Patient Communication     Last Update Description Specialty     Today -- Cardiology     [facility], Patricia Closed     2 days ago -- ---     Surgery Physician Richy Abernathy Open     3 days ago oxyCODONE HCl (10 mg Oral Every 8 hours PRN, 30 days supply) Family Medicine      Martha García Open     6 days ago Meds approved: 2 Family Medicine      Nicci Bonilla Closed     1 week ago Meds refused: 4 Family Medicine      Madhuri Garcia Open       There are additional recent communications with this patient. View the rest in Chart Review.

## 2024-09-09 NOTE — UTILIZATION REVIEW
NOTIFICATION OF ADMISSION DISCHARGE   This is a Notification of Discharge from [facility]. Please be advised that this patient has been discharge from our facility. Below you will find the admission and discharge date and time including the patient’s disposition.   UTILIZATION REVIEW CONTACT:  Nerissa Smith  Utilization   Network Utilization Review Department  Phone: [phone] x carefully listen to the prompts. All voicemails are confidential.  Email: [email or web address]     ADMISSION INFORMATION  PRESENTATION DATE: 8/30/2024  2:04 PM  OBERVATION ADMISSION DATE: N/A  INPATIENT ADMISSION DATE: 8/30/24  5:20 PM   DISCHARGE DATE: 9/6/2024  2:24 PM   DISPOSITION:Home/Self Care    Network Utilization Review Department  ATTENTION: Please call with any questions or concerns to [phone] and carefully listen to the prompts so that you are directed to the right person. All voicemails are confidential.   For Discharge needs, contact Care Management DC Support Team at [phone] opt. [phone]  Send all requests for admission clinical reviews, approved or denied determinations and any other requests to dedicated fax number below belonging to the campus where the patient is receiving treatment. List of dedicated fax numbers for the Facilities:  FACILITY NAME UR FAX NUMBER   ADMISSION DENIALS (Administrative/Medical Necessity) [phone]   DISCHARGE SUPPORT TEAM ([facility]) [phone]   PARENT CHILD HEALTH (Maternity/NICU/Pediatrics) [phone]   [facility] [phone]   [facility] [phone]   [facility] [phone]   [facility] [phone]   [facility] [phone]   [facility] [phone]   [facility] [phone]   [facility] [phone]    [facility] [phone]   [facility] [phone]   [facility] [phone]   [facility] [phone]

## 2024-09-09 NOTE — TELEPHONE ENCOUNTER
Secure chat Dr Kuo:      MD ANTOINETTE Rosario sent over again the 160mg script    Spoke with the pharmacist at [facility], he did see the order and will be filling for patient, unsure if it will be pill pack since he did not get any new orders, will reach out to patient and PCP.

## 2024-09-09 NOTE — TELEPHONE ENCOUNTER
Spoke to pt she has dialysis Tuedsays and Thursdays spoke to Carrie and we are going to put pt in with matheus 9/16 pt confirmed

## 2024-09-10 RX ORDER — LEVOTHYROXINE SODIUM 50 UG/1
50 TABLET ORAL DAILY
Qty: 90 TABLET | Refills: 0 | Status: SHIPPED | OUTPATIENT
Start: 2024-09-10

## 2024-09-10 RX ORDER — OXYCODONE HYDROCHLORIDE 10 MG/1
10 TABLET ORAL EVERY 8 HOURS PRN
Qty: 90 TABLET | Refills: 0 | Status: SHIPPED | OUTPATIENT
Start: 2024-09-10

## 2024-09-10 RX ORDER — PANTOPRAZOLE SODIUM 40 MG/1
40 TABLET, DELAYED RELEASE ORAL DAILY
Qty: 90 TABLET | Refills: 0 | Status: SHIPPED | OUTPATIENT
Start: 2024-09-10

## 2024-09-10 RX ORDER — ATORVASTATIN CALCIUM 40 MG/1
40 TABLET, FILM COATED ORAL DAILY
Qty: 90 TABLET | Refills: 0 | Status: SHIPPED | OUTPATIENT
Start: 2024-09-10

## 2024-09-10 RX ORDER — DOCUSATE SODIUM 100 MG/1
100 CAPSULE, LIQUID FILLED ORAL 2 TIMES DAILY
Qty: 90 CAPSULE | Refills: 1 | Status: SHIPPED | OUTPATIENT
Start: 2024-09-10

## 2024-09-11 ENCOUNTER — TELEPHONE (OUTPATIENT)
Dept: FAMILY MEDICINE CLINIC | Facility: CLINIC | Age: 64
End: 2024-09-11

## 2024-09-11 NOTE — TELEPHONE ENCOUNTER
Call placed to the patient and spoke with Dee Dee to assist with scheduling an appointment for SPT change.  Patient scheduled 9/20/2024 at 130 pm at the [facility] office.

## 2024-09-13 PROBLEM — I50.22 CHRONIC HFREF (HEART FAILURE WITH REDUCED EJECTION FRACTION) (HCC): Status: ACTIVE | Noted: 2021-08-24

## 2024-09-13 NOTE — ASSESSMENT & PLAN NOTE
- s/p PCI to ostial LCX 9/4/24   - July 2022: s/p cath w/ JANET-mLCx, moderate diffuse LAD and 95% small RCA (treated medically)   - s/p PCI x5 to unknown vessels in 2012 and 2017 in [place]   - DAPT: Plavix 75 mg daily and ASA 81 mg   - on Toprol-XL 12.5 mg daily and atorvastatin 40 mg daily  - antianginal: Imdur 30 mg daily     Pt denies any anginal chest pain today. States she has been compliant with DAPT. She recently moved and would like to switch to the [place] office.  - continue DAPT, Toprol- XL and atorvastatin  - year supply of plavix and ASA sent to the pharmacy   - wrote down number for cardiac rehab as pt states she is interested

## 2024-09-13 NOTE — PROGRESS NOTES
Dee Dee Shaffer  1960  [id number]  CARDIOVASC PHYSICIAN  [address] [place] [id number]  [phone]  [phone]    1. Coronary artery disease with stable angina pectoris ([facility])  aspirin (Aspirin Low Dose) 81 mg EC tablet    clopidogrel (PLAVIX) 75 mg tablet      2. Chronic HFrEF (heart failure with reduced ejection fraction) ([facility])        3. ESRD (end stage renal disease) ([facility])        4. Type 2 diabetes mellitus with chronic kidney disease on chronic dialysis, with long-term current use of insulin ([facility])        5. Mixed hyperlipidemia            1. Coronary artery disease with stable angina pectoris ([facility])  Assessment & Plan:  - s/p PCI to ostial LCX 9/4/24   - July 2022: s/p cath w/ JANET-mLCx, moderate diffuse LAD and 95% small RCA (treated medically)   - s/p PCI x5 to unknown vessels in 2012 and 2017 in [place]   - DAPT: Plavix 75 mg daily and ASA 81 mg   - on Toprol-XL 12.5 mg daily and atorvastatin 40 mg daily  - antianginal: Imdur 30 mg daily     Pt denies any anginal chest pain today. States she has been compliant with DAPT. She recently moved and would like to switch to the [place] office.  - continue DAPT, Toprol- XL and atorvastatin  - year supply of plavix and ASA sent to the pharmacy   - wrote down number for cardiac rehab as pt states she is interested   Orders:  -     aspirin (Aspirin Low Dose) 81 mg EC tablet; Take 1 tablet (81 mg total) by mouth daily  -     clopidogrel (PLAVIX) 75 mg tablet; Take 1 tablet (75 mg total) by mouth daily  2. Chronic HFrEF (heart failure with reduced ejection fraction) ([facility])  Assessment & Plan:  - etiology: ischemic cardiomyopathy   - TTE 09/02/24: LVEF 23%, severe global hypokinesis, grade II diastolic dysfunction, moderate LAE, mild DIDIER, aortic valve sclerosis, mild MAC, mild to moderate MR, mild TR, moderate pHTN  - discharge weight (09/06/24): 243 lbs  - office weight: 235 lbs  - Neurohormonal Blockade    -- beta blocker:  Toprol-XL 12.5 mg daily    -- ACE/ARB/ARNi: losartan 25 mg daily    -- aldosterone antagonist: none    -- diuretic: lasix 80 mg BID (managed by nephrology)    She has 1+ pitting edema bilaterally.  - volume status managed by nephrology  - encouraged low salt diet and daily weights  3. ESRD (end stage renal disease) ([facility])  Assessment & Plan:  - on hemodialysis Tu/Th/Sat  - volume status including diuretic managed by nephrology  4. Type 2 diabetes mellitus with chronic kidney disease on chronic dialysis, with long-term current use of insulin ([facility])  Assessment & Plan:  - A1C 08/31/24: 6.5  - maintained on insulin     5. Mixed hyperlipidemia  Assessment & Plan:  - Lipid panel 04/10/24: cholesterol 100, triglycerides 164, HDL 37, LDL 30  - on atorvastatin 80 mg daily       RTO 10/23/24 month with Dr. Martin.    Most recent cardiac testing   [facility] 09/04/24: patent LAD stents, patent Lcx stents (1 focal stenosis 50%), severe ostial Lcx (intervened on), dLM disease  TTE 09/02/24: LVEV 23%. Systolic function severally reduced, severe global hypokinesis, grade II diastolic dysfunction, moderate LAE, mild DIDIER, aoritc valve sclerosis, mild MAC, mild to moderate MR  TTE 06/39/24: LVEF 38%, mild LAE, aortic valve sclerosis, focal MAC, mild MR    Interval History:   This is a 62 y/o female with a PMH of CAD s/p multiple PCI/stents, combined systolic and diastolic heart failure, HLD, ESRD, and TIIDM who is presenting today for follow up. She was hospitalized at [facility] 08/30/24-09/06/24 with weakness and SOB. She was volume up on admission and had elevated troponin > 18,000, BNP of 3,596, and CXR that showed small to moderate bilateral pleural effusions. She underwent cardiac catheterization and had successful PCI to the ostial LCX. She was discharged home on DAPT, atorvastatin and a beta blocker.     Pt states since she has been home from the hospital she is doing okay. Notes some fatigue and SOB. States more fatigued after dialysis  days. She recently moved to [place] and is in the process of switching over her care to the [facility]. She uses a cane for ambulation and her son in [place] takes her to most of her appointments. She has been complaint with all her cardiac medications. We discussed cardiac rehab and she did seem interested. She denies any chest pain, palpitations, lightheadedness, dizziness, and syncope.    Significant family history of CAD in her mom who required CABG. She is a former smoker and denies current alcohol or tobacco use.     Past Medical History:   Diagnosis Date    Abnormal liver function     Anemia     Anxiety     Arthritis     CHF (congestive heart failure) ([facility])     Chronic kidney disease     stage 3    Chronic narcotic dependence ([facility])     Chronic pain disorder     lower back, hands , neck and knees    Coronary artery disease     Depression     Diabetes mellitus ([facility])     Elevated troponin 2022    GERD (gastroesophageal reflux disease)     no meds at present    Heart murmur     murmur    Hyperlipidemia     Hypertension     Neurogenic bladder     Open toe wound 2020    right big toe open calus but is dry at present    PONV (postoperative nausea and vomiting)     Renal disorder     Shortness of breath     exertion    Skin ulcer of hand, limited to breakdown of skin ([facility]) 2021    Sleep apnea     doesn't use cpap    Suprapubic catheter ([facility])     Urinary retention      Social History     Socioeconomic History    Marital status:      Spouse name: Not on file    Number of children: Not on file    Years of education: Not on file    Highest education level: Not on file   Occupational History    Not on file   Tobacco Use    Smoking status: Former     Current packs/day: 0.00     Average packs/day: 1 pack/day for 35.0 years (35.0 ttl pk-yrs)     Types: Cigarettes     Start date:      Quit date:      Years since quittin.7     Passive exposure: Past    Smokeless tobacco: Never   Vaping  Use    Vaping status: Never Used   Substance and Sexual Activity    Alcohol use: Not Currently    Drug use: Never    Sexual activity: Not Currently   Other Topics Concern    Not on file   Social History Narrative    Not on file     Social Determinants of Health     Financial Resource Strain: Low Risk  (1/3/2024)    Overall Financial Resource Strain (CARDIA)     Difficulty of Paying Living Expenses: Not hard at all   Food Insecurity: No Food Insecurity (9/3/2024)    Hunger Vital Sign     Worried About Running Out of Food in the Last Year: Never true     Ran Out of Food in the Last Year: Never true   Transportation Needs: No Transportation Needs (9/3/2024)    PRAPARE - Transportation     Lack of Transportation (Medical): No     Lack of Transportation (Non-Medical): No   Physical Activity: Not on file   Stress: Not on file   Social Connections: Not on file   Intimate Partner Violence: Not on file   Housing Stability: Low Risk  (9/3/2024)    Housing Stability Vital Sign     Unable to Pay for Housing in the Last Year: No     Number of Times Moved in the Last Year: 1     Homeless in the Last Year: No      Family History   Problem Relation Age of Onset    Stroke Father     Heart disease Father     No Known Problems Mother     No Known Problems Sister     No Known Problems Daughter     No Known Problems Maternal Grandmother     No Known Problems Maternal Grandfather     No Known Problems Paternal Grandmother     No Known Problems Paternal Grandfather     No Known Problems Maternal Aunt     No Known Problems Maternal Aunt     No Known Problems Maternal Aunt     No Known Problems Maternal Aunt     No Known Problems Maternal Aunt     No Known Problems Maternal Aunt     No Known Problems Paternal Aunt      Past Surgical History:   Procedure Laterality Date    AMPUTATION Left     2nd toe    ANGIOPLASTY  2017    5    BREAST EXCISIONAL BIOPSY Left     BREAST SURGERY      CARDIAC CATHETERIZATION      CARDIAC CATHETERIZATION N/A  07/01/2022    Procedure: Cardiac catheterization;  Surgeon: Minh Franz MD;  Location: [facility];  Service: Cardiology    CARDIAC CATHETERIZATION N/A 07/01/2022    Procedure: Cardiac pci;  Surgeon: Minh Franz MD;  [facility]: [facility];  Service: Cardiology    CARDIAC CATHETERIZATION Left 9/4/2024    Procedure: Cardiac Left Heart Cath;  Surgeon: Dajuan Mac MD;  [facility]: [facility];  Service: Cardiology    CARDIAC CATHETERIZATION N/A 9/4/2024    Procedure: Cardiac Coronary Angiogram;  Surgeon: Dajuan Mac MD;  Location: [facility];  Service: Cardiology    CARDIAC CATHETERIZATION N/A 9/4/2024    Procedure: Cardiac PCI Stent;  Surgeon: Dajuan Mac MD;  Location: [facility];  Service: Cardiology    CARPAL TUNNEL RELEASE Bilateral     CERVICAL FUSION      HYSTERECTOMY  2008    IR AV FISTULAGRAM/GRAFTOGRAM  04/26/2023    IR AV FISTULAGRAM/GRAFTOGRAM  2/28/2024    IR SUPRAPUBIC CATHETER PLACEMENT  06/15/2021    IR SUPRAPUBIC CATHETER PLACEMENT  02/14/2023    IR TUNNELED CENTRAL LINE PLACEMENT  04/04/2023    IR TUNNELED DIALYSIS CATHETER PLACEMENT  07/15/2022    IR TUNNELED DIALYSIS CATHETER PLACEMENT  12/12/2022    IR TUNNELED DIALYSIS CATHETER REMOVAL  8/29/2023    KNEE SURGERY      OOPHORECTOMY  2008    MO ARTERIOVENOUS ANASTOMOSIS OPEN DIRECT Left 02/06/2023    Procedure: CREATION FISTULA  ARTERIOVENOUS (AV);  Surgeon: Minna Herbert DO;  Location: [facility];  Service: Vascular    MO EXC B9 LESION MRGN XCP SK TG S/N/H/F/G 3.1-4.0CM N/A 12/21/2020    Procedure: EXCISION SEBACEOUS CYST X 5 SCALP;  Surgeon: Minh Benton MD;  Location:  MAIN OR;  Service: General    MO REVJ OPN ARVEN FSTL W/O THRMBC DIAL GRF Left 7/3/2023    Procedure: REVISION AV FISTULA;  Surgeon: Minna Herbert DO;  Location: [facility];  Service: Vascular    TOE AMPUTATION Left     TRIGGER FINGER RELEASE Right     4th Finger       Current Outpatient Medications:     allopurinol  (ZYLOPRIM) 100 mg tablet, TAKE 2 TABLETS (200 MG) BY MOUTH DAILY, Disp: 180 tablet, Rfl: 0    aspirin (Aspirin Low Dose) 81 mg EC tablet, Take 1 tablet (81 mg total) by mouth daily, Disp: 90 tablet, Rfl: 3    atorvastatin (LIPITOR) 40 mg tablet, TAKE 1 TABLET (40 MG TOTAL) BY MOUTH DAILY, Disp: 90 tablet, Rfl: 0    Blood Glucose Monitoring Suppl (OneTouch Verio Reflect) w/Device KIT, Check blood sugars once daily. Please substitute with appropriate alternative as covered by patient's insurance. Dx: E11.65, Disp: 1 kit, Rfl: 0    calcitriol (ROCALTROL) 0.5 MCG capsule, Take 1 capsule (0.5 mcg total) by mouth 3 (three) times a week, Disp: 45 capsule, Rfl: 5    citalopram (CeleXA) 40 mg tablet, TAKE 1 TABLET BY MOUTH DAILY, Disp: 90 tablet, Rfl: 0    clopidogrel (PLAVIX) 75 mg tablet, Take 1 tablet (75 mg total) by mouth daily, Disp: 90 tablet, Rfl: 3    Continuous Blood Gluc Sensor (Dexcom G7 Sensor), Use 1 Device every 10 days, Disp: 9 each, Rfl: 3    docusate sodium (COLACE) 100 mg capsule, TAKE 1 CAPSULE (100 MG TOTAL) BY MOUTH 2 (TWO) TIMES A DAY, Disp: 90 capsule, Rfl: 1    ergocalciferol (VITAMIN D2) 50,000 units, TAKE 1 CAPSULE (50,000 UNITS TOTAL) BY MOUTH 3 (THREE) TIMES A WEEK, Disp: 16 capsule, Rfl: 0    furosemide (LASIX) 80 mg tablet, Take 2 tablets (160 mg total) by mouth daily, Disp: 180 tablet, Rfl: 3    glucose blood (OneTouch Verio) test strip, Check blood sugars once daily. Please substitute with appropriate alternative as covered by patient's insurance. Dx: E11.65, Disp: 100 each, Rfl: 3    insulin degludec (Tresiba FlexTouch) 200 units/mL CONCENTRATED U-200 injection pen, Inject 20 Units under the skin daily, Disp: , Rfl:     insulin lispro (HumaLOG) 100 units/mL injection pen, Inject 5 Units under the skin 3 (three) times a day with meals INJECT 20 UNITS UNDER SKIN THREE TIMES A DAY BEFORE MEALS, Disp: , Rfl:     Insulin Pen Needle (BD Pen Needle Micro U/F) 32G X 6 MM MISC, Inject under the skin  3 (three) times a day, Disp: 200 each, Rfl: 3    Insulin Pen Needle (BD Pen Needle Joanne 2nd Gen) 32G X 4 MM MISC, INJECT UNDER THE SKIN 4 (FOUR) TIMES A DAY USE AS DIRECTED, Disp: 100 each, Rfl: 1    isosorbide mononitrate (IMDUR) 30 mg 24 hr tablet, TAKE 1 TABLET (30 MG TOTAL) BY MOUTH EVERY EVENING, Disp: 90 tablet, Rfl: 0    ketoconazole (NIZORAL) 2 % cream, , Disp: , Rfl:     lactulose (CHRONULAC) 10 g/15 mL solution, TAKE 30 ML (20 G TOTAL) BY MOUTH DAILY AS NEEDED (FOR CONSTIPATION), Disp: 946 mL, Rfl: 2    levothyroxine 50 mcg tablet, TAKE 1 TABLET (50 MCG TOTAL) BY MOUTH DAILY, Disp: 90 tablet, Rfl: 0    losartan (COZAAR) 25 mg tablet, TAKE 1 TABLET (25 MG TOTAL) BY MOUTH DAILY, Disp: 90 tablet, Rfl: 0    metoprolol succinate (TOPROL-XL) 25 mg 24 hr tablet, Take 0.5 tablets (12.5 mg total) by mouth daily, Disp: 15 tablet, Rfl: 0    midodrine (PROAMATINE) 5 mg tablet, TAKE 1 TABLET (5 MG TOTAL) BY MOUTH AS NEEDED (IF SBP<100 WITH DIALYSIS), Disp: 90 tablet, Rfl: 2    naloxone (NARCAN) 4 mg/0.1 mL nasal spray, Administer 1 spray into a nostril. If breathing does not return to normal or if breathing difficulty resumes after 2-3 minutes, give another dose in the other nostril using a new spray., Disp: 1 each, Rfl: 1    nitroglycerin (NITROSTAT) 0.4 mg SL tablet, Place 1 tablet (0.4 mg total) under the tongue every 5 (five) minutes as needed for chest pain, Disp: 25 tablet, Rfl: 1    nystatin (MYCOSTATIN) powder, Apply topically 3 (three) times a day, Disp: 30 g, Rfl: 3    OLANZapine (ZyPREXA) 5 mg tablet, TAKE 1 TABLET (5 MG TOTAL) BY MOUTH DAILY AT BEDTIME, Disp: 90 tablet, Rfl: 0    ondansetron (ZOFRAN) 4 mg tablet, Take 1 tablet (4 mg total) by mouth every 8 (eight) hours as needed for nausea or vomiting, Disp: 12 tablet, Rfl: 0    OneTouch Delica Lancets 33G MISC, Check blood sugars once daily. Please substitute with appropriate alternative as covered by patient's insurance. Dx: E11.65, Disp: 100 each, Rfl:  5    oxyCODONE (ROXICODONE) 10 MG TABS, Take 1 tablet (10 mg total) by mouth every 8 (eight) hours as needed for severe pain 30 days supply Max Daily Amount: 30 mg, Disp: 90 tablet, Rfl: 0    Ozempic, 2 MG/DOSE, 8 MG/3ML injection pen, INJECT 0.75 ML (2 MG TOTAL) UNDER THE SKIN EVERY 7 DAYS, Disp: 9 mL, Rfl: 0    pantoprazole (PROTONIX) 40 mg tablet, TAKE 1 TABLET (40 MG TOTAL) BY MOUTH DAILY, Disp: 90 tablet, Rfl: 0    senna-docusate sodium (Senexon-S) 8.6-50 mg per tablet, TAKE 1 TABLET BY MOUTH DAILY, Disp: 90 tablet, Rfl: 0    sevelamer carbonate (RENVELA) 800 mg tablet, TAKE 2 TABLETS (1,600 MG TOTAL) BY MOUTH 3 (THREE) TIMES A DAY WITH MEALS, Disp: 540 tablet, Rfl: 3    silver sulfadiazine (SILVADENE,SSD) 1 % cream, Apply topically daily, Disp: 50 g, Rfl: 0    Current Facility-Administered Medications:     cyanocobalamin injection 1,000 mcg, 1,000 mcg, Intramuscular, Q30 Days, CHERELLE Franklin, 1,000 mcg at 01/05/23 1651  Allergies   Allergen Reactions    Latex Itching    Codeine GI Intolerance       Labs:     Chemistry        Component Value Date/Time    K 4.3 09/05/2024 0855    CL 98 09/05/2024 0855    CO2 27 09/05/2024 0855    BUN 40 (H) 09/05/2024 0855    CREATININE 4.76 (H) 09/05/2024 0855        Component Value Date/Time    CALCIUM 8.5 09/05/2024 0855    ALKPHOS 85 08/31/2024 0545     (H) 08/31/2024 0545     (H) 08/31/2024 0545        Lab Results   Component Value Date    HDL 37 (L) 04/10/2024    HDL 42 (L) 06/24/2022    HDL 30 (L) 08/03/2021     Lab Results   Component Value Date    LDLCALC 30 04/10/2024    LDLCALC 50 06/24/2022    LDLCALC 86 08/03/2021     Lab Results   Component Value Date    TRIG 164 (H) 04/10/2024    TRIG 138 06/24/2022    TRIG 180 (H) 08/03/2021     Imaging: Cardiac catheterization    Result Date: 9/4/2024  Narrative:   Dist LM lesion is 40% stenosed.   Ost Cx lesion is 85% stenosed.   Prox RCA lesion is 90% stenosed.   1st Mrg lesion is 60% stenosed.   Prox  Cx to Mid Cx lesion is 50% stenosed.   2nd LPL lesion is 60% stenosed.   Mid LAD lesion is 10% stenosed. Patent LAD stents Patent LCX stent (1 focal stenosis 50%, 6mm2) Severe ostial LCX (EDDIE 1.9mm2)/Intervened on dLM disease 40% (area 8.8 mm2, 55% by IVUS area)     Echo complete w/ contrast if indicated    Result Date: 9/3/2024  Narrative:   Left Ventricle: Left ventricular cavity size is mildly dilated. There is mild concentric hypertrophy. The left ventricular ejection fraction is 23% by visual estimation. Systolic function is severely reduced. There is severe global hypokinesis with regional variation and somewhat paradoxical wall motion consistent with conduction abnormality. Diastolic function is moderately abnormal, consistent with grade II (pseudonormal) relaxation.  Left atrial filling pressure is elevated.   Right Ventricle: Right ventricular cavity size is mildly dilated. Systolic function is normal.   Left Atrium: The atrium is moderately dilated.   Right Atrium: The atrium is mildly dilated.   Aortic Valve: There is aortic valve sclerosis.   Mitral Valve: There is mild annular calcification. There is mild to moderate regurgitation.   Tricuspid Valve: There is mild regurgitation. Pulmonary artery systolic pressures are estimated at 49 mmHg.   Compared to report from June 29, 2022, there is further reduction in left ventricular systolic function with mild to moderate mitral regurgitation and mild pulmonary hypertension.     XR chest 2 views    Result Date: 8/30/2024  Narrative: XR CHEST PA AND LATERAL INDICATION: weakness. COMPARISON: Radiograph 12/11/2022 FINDINGS: Small to moderate bilateral pleural effusions and diffuse interstitial opacities. No pneumothorax. Normal cardiomediastinal silhouette. Cervical spine ACDF. Normal upper abdomen.     Impression: Small bilateral pleural effusions and mild to moderate pulmonary interstitial edema. Workstation performed: [id number]     Review of Systems  "  Constitutional: Positive for malaise/fatigue. Negative for chills, diaphoresis, fever and weight gain.   Cardiovascular:  Negative for chest pain, dyspnea on exertion, irregular heartbeat, leg swelling, near-syncope, orthopnea, palpitations, paroxysmal nocturnal dyspnea and syncope.   Respiratory:  Positive for shortness of breath. Negative for cough, sleep disturbances due to breathing, snoring and wheezing.    Skin:  Negative for rash.   Gastrointestinal:  Negative for bloating, abdominal pain and nausea.   Neurological:  Negative for dizziness and light-headedness.       Vitals:    09/16/24 1408   BP: 112/58   Pulse: 64     Vitals:    09/16/24 1408   Weight: 107 kg (235 lb 9.6 oz)     Height: 5' 7\" (170.2 cm)   Body mass index is 36.9 kg/m².    Physical Exam  Constitutional:       General: She is not in acute distress.     Appearance: Normal appearance. She is not ill-appearing.   HENT:      Head: Normocephalic and atraumatic.      Nose: Nose normal.      Mouth/Throat:      Mouth: Mucous membranes are moist.   Eyes:      General: No scleral icterus.  Cardiovascular:      Rate and Rhythm: Normal rate and regular rhythm.      Pulses:           Radial pulses are 2+ on the right side and 2+ on the left side.      Heart sounds: No murmur heard.     No friction rub. No gallop. No S3 or S4 sounds.   Pulmonary:      Effort: Pulmonary effort is normal. No respiratory distress.      Breath sounds: Normal breath sounds. No stridor. No wheezing, rhonchi or rales.   Abdominal:      General: Abdomen is flat.   Musculoskeletal:         General: No swelling.      Right lower leg: No edema.      Left lower leg: No edema.   Skin:     General: Skin is warm.      Capillary Refill: Capillary refill takes less than 2 seconds.   Neurological:      Mental Status: She is alert. Mental status is at baseline.   Psychiatric:         Thought Content: Thought content normal.        "

## 2024-09-13 NOTE — ASSESSMENT & PLAN NOTE
- etiology: ischemic cardiomyopathy   - TTE 09/02/24: LVEF 23%, severe global hypokinesis, grade II diastolic dysfunction, moderate LAE, mild DIDIER, aortic valve sclerosis, mild MAC, mild to moderate MR, mild TR, moderate pHTN  - discharge weight (09/06/24): 243 lbs  - office weight: 235 lbs  - Neurohormonal Blockade    -- beta blocker: Toprol-XL 12.5 mg daily    -- ACE/ARB/ARNi: losartan 25 mg daily    -- aldosterone antagonist: none    -- diuretic: lasix 80 mg BID (managed by nephrology)    She has 1+ pitting edema bilaterally.  - volume status managed by nephrology  - encouraged low salt diet and daily weights

## 2024-09-16 ENCOUNTER — OFFICE VISIT (OUTPATIENT)
Dept: CARDIOLOGY CLINIC | Facility: CLINIC | Age: 64
End: 2024-09-16
Payer: MEDICARE

## 2024-09-16 VITALS
DIASTOLIC BLOOD PRESSURE: 58 MMHG | BODY MASS INDEX: 36.98 KG/M2 | SYSTOLIC BLOOD PRESSURE: 112 MMHG | WEIGHT: 235.6 LBS | HEIGHT: 67 IN | HEART RATE: 64 BPM

## 2024-09-16 DIAGNOSIS — I25.118 CORONARY ARTERY DISEASE WITH STABLE ANGINA PECTORIS (HCC): Primary | ICD-10-CM

## 2024-09-16 DIAGNOSIS — I50.22 CHRONIC HFREF (HEART FAILURE WITH REDUCED EJECTION FRACTION) (HCC): ICD-10-CM

## 2024-09-16 DIAGNOSIS — E11.22 TYPE 2 DIABETES MELLITUS WITH CHRONIC KIDNEY DISEASE ON CHRONIC DIALYSIS, WITH LONG-TERM CURRENT USE OF INSULIN (HCC): ICD-10-CM

## 2024-09-16 DIAGNOSIS — Z79.4 TYPE 2 DIABETES MELLITUS WITH CHRONIC KIDNEY DISEASE ON CHRONIC DIALYSIS, WITH LONG-TERM CURRENT USE OF INSULIN (HCC): ICD-10-CM

## 2024-09-16 DIAGNOSIS — N18.6 ESRD (END STAGE RENAL DISEASE) (HCC): ICD-10-CM

## 2024-09-16 DIAGNOSIS — N18.6 TYPE 2 DIABETES MELLITUS WITH CHRONIC KIDNEY DISEASE ON CHRONIC DIALYSIS, WITH LONG-TERM CURRENT USE OF INSULIN (HCC): ICD-10-CM

## 2024-09-16 DIAGNOSIS — Z99.2 TYPE 2 DIABETES MELLITUS WITH CHRONIC KIDNEY DISEASE ON CHRONIC DIALYSIS, WITH LONG-TERM CURRENT USE OF INSULIN (HCC): ICD-10-CM

## 2024-09-16 DIAGNOSIS — E78.2 MIXED HYPERLIPIDEMIA: ICD-10-CM

## 2024-09-16 PROCEDURE — 99214 OFFICE O/P EST MOD 30 MIN: CPT

## 2024-09-16 RX ORDER — CLOPIDOGREL BISULFATE 75 MG/1
75 TABLET ORAL DAILY
Qty: 90 TABLET | Refills: 3 | Status: SHIPPED | OUTPATIENT
Start: 2024-09-16 | End: 2024-09-27 | Stop reason: SDUPTHER

## 2024-09-16 RX ORDER — ASPIRIN 81 MG/1
81 TABLET ORAL DAILY
Qty: 90 TABLET | Refills: 3 | Status: SHIPPED | OUTPATIENT
Start: 2024-09-16 | End: 2024-09-27 | Stop reason: SDUPTHER

## 2024-09-17 ENCOUNTER — TRANSITIONAL CARE MANAGEMENT (OUTPATIENT)
Dept: FAMILY MEDICINE CLINIC | Facility: CLINIC | Age: 64
End: 2024-09-17

## 2024-09-18 ENCOUNTER — PATIENT OUTREACH (OUTPATIENT)
Dept: FAMILY MEDICINE CLINIC | Facility: CLINIC | Age: 64
End: 2024-09-18

## 2024-09-18 PROBLEM — N30.00 ACUTE CYSTITIS WITHOUT HEMATURIA: Status: RESOLVED | Noted: 2023-02-28 | Resolved: 2024-09-18

## 2024-09-18 NOTE — PROGRESS NOTES
I called the patient to follow up.  She states she is feeling better and having energy to start unpacking/organizing in her new residence.  The patient denies chest pain.  She reports shortness of breath only with exertion as well as mild ankle edema, L>R.  I reminded her to elevate her legs frequently and continue watching her sodium intake.    The patient notes dialysis at her new facility is going well noting it is very quiet compared to the one in [place].  She believes her weight from yesterday was 235.  I still encouraged her to check daily weights once they find the scale.  The patient reports her blood sugars have been stable.  I again commended her on lowering her A1C to 6.5 and asked her to keep up the good work.    I reminded the patient of her Uro appointment on Friday for a catheter exchange.  She is anxious about this as she is overdue for a new one and knows it will be uncomfortable. I encouraged her to ask Uro for an antispasmodic to see if this helps.    The patient is scheduled for a follow up at [facility] but would benefit from seeing her PCP; note sent to clerical to reschedule with Madhuri.    I will continue weekly follow up for now.

## 2024-09-20 ENCOUNTER — PATIENT OUTREACH (OUTPATIENT)
Dept: FAMILY MEDICINE CLINIC | Facility: CLINIC | Age: 64
End: 2024-09-20

## 2024-09-20 ENCOUNTER — PROCEDURE VISIT (OUTPATIENT)
Dept: UROLOGY | Facility: CLINIC | Age: 64
End: 2024-09-20
Payer: COMMERCIAL

## 2024-09-20 VITALS
DIASTOLIC BLOOD PRESSURE: 70 MMHG | WEIGHT: 239 LBS | HEART RATE: 76 BPM | RESPIRATION RATE: 20 BRPM | OXYGEN SATURATION: 97 % | BODY MASS INDEX: 37.51 KG/M2 | SYSTOLIC BLOOD PRESSURE: 140 MMHG | HEIGHT: 67 IN

## 2024-09-20 DIAGNOSIS — N31.9 NEUROGENIC BLADDER: Primary | ICD-10-CM

## 2024-09-20 PROCEDURE — 51705 CHANGE OF BLADDER TUBE: CPT

## 2024-09-20 NOTE — PROGRESS NOTES
I received a call from the patient's daughter, Yenny.  She was unsure when the patient's next appointment is. I informed her the clerical team is trying to reschedule with Madhuri for a follow up (see 9/18 note).  I reminded Yenny the patient has an appointment this afternoon with Uro; she states she is already there and is early.  I encouraged Yenny to find a Uro closer to home so future catheter exchanges can be done there instead of driving so far every month; she agreed.  Yenny notes she is trying to keep up on the patient's care and appointments which I commended her for.      She will call with any further questions or concerns.

## 2024-09-20 NOTE — PROGRESS NOTES
"9/20/2024    Dee Dee Shaffer  1960  [id number]    Diagnosis  Chief Complaint    Neurogenic Bladder         Patient presents for SPT change managed by Dr. Tavarez    Plan  Patient has moved to [address] but will contact the office if she needs an appointment.    Procedure: Suprapubic Tube Change       Cystostomy tube change     Date/Time  9/20/2024 1:30 PM     Performed by  Iveth Jerry RN   Authorized by  Minor Tavarez MD     [place] Protocol   Consent: Verbal consent obtained.  Consent given by: patient     Procedure Details   Patient tolerance: patient tolerated the procedure well with no immediate complications               Current catheter removed without difficulty after deflation of an intact balloon. Site prepped with Hibiclens, new 16F  silicone  suprapubic spt change via aseptic technique without incident, 10 ml balloon inflated with sterile water. irrigated easily for pink-tinged return, mild spasm noted. Patient tolerated well. Attached to drainage bag.     Vitals:    09/20/24 1057   BP: 140/70   Pulse: 76   Resp: 20   SpO2: 97%   Weight: 108 kg (239 lb)   Height: 5' 7\" (1.702 m)         Iveth Jerry RN   "

## 2024-09-23 ENCOUNTER — TELEPHONE (OUTPATIENT)
Dept: CARDIOLOGY CLINIC | Facility: CLINIC | Age: 64
End: 2024-09-23

## 2024-09-23 NOTE — TELEPHONE ENCOUNTER
Spoke with patient, states she is doing well. States she has someone there now, advised I will call back later.     1524~spoke with patient    Symptoms:  -leg swelling [x] no different  -abdominal bloating, distension, pants fitting tighter []              -loss of appetite, feeling full sooner than usual []  -worsening shortness of breath/PADILLA, activity intolerance[]  -difficulty lying flat, waking up a night feeling breathless, using more pillows, sleeping in a recliner []  -palpitations []  -chest pain/pressure []  -new or worsening cough []  -unusual fatigue []    Comments:        Weight:   -weighs self daily [] patient still unpacking and did not find her scale, advised it is important to try to find her scale and weight herself daily. Nephrology is managing volume status  -dry/target weight 243 lbs, 9/16 OV weight 235 lbs  -today's weight    -understands what to do for rapid weight gain, ie 3lbs in 24 hours or 5 lbs in one week[x]     Comments: patient goes to dialysis Tues Thurs Sat        Diet:   -consuming any high sodium foods (canned soups, lunch meats, fast food/take out, snack food, prepared foods, sport drinks) yes[]no[x]  - fluid consumed per day-     32 OZ  -2g (2000 mg) Sodium, 2L (2000 ml, around 60-64 oz) fluid restriction] reinforced [x]        Comments:        Medications:  -med list reviewed []   -missed doses or taking a different dose than prescribed?  yes[]no[x]  -still urinates well on current diuretic ? yes[]no[]          Comments:        Home vital signs: (if available)NA  BP ____  HR____  Pulse ox____          Other possible triggers ?: NA  Recent viral symptoms/infection []  NSAID use []  Steroids []  Anemia []  COPD/hypoxia []  Not using CPAP/BiPAP []     Comments:patient will be new patient with Dr Martin 10/23/24

## 2024-09-25 ENCOUNTER — TELEPHONE (OUTPATIENT)
Age: 64
End: 2024-09-25

## 2024-09-25 NOTE — TELEPHONE ENCOUNTER
Pt was contacted in regard to a 5/2/25 appointment on the reschedule list. Pt stated she did not wish to reschedule the appointment at this time as she recently moved to [facility] and is unsure if she will be continuing care with our practice.

## 2024-09-27 ENCOUNTER — PATIENT OUTREACH (OUTPATIENT)
Dept: FAMILY MEDICINE CLINIC | Facility: CLINIC | Age: 64
End: 2024-09-27

## 2024-09-27 ENCOUNTER — CLINICAL SUPPORT (OUTPATIENT)
Dept: CARDIAC REHAB | Facility: HOSPITAL | Age: 64
End: 2024-09-27
Attending: INTERNAL MEDICINE
Payer: COMMERCIAL

## 2024-09-27 DIAGNOSIS — N18.32 STAGE 3B CHRONIC KIDNEY DISEASE (HCC): ICD-10-CM

## 2024-09-27 DIAGNOSIS — I10 HYPERTENSION, UNSPECIFIED TYPE: ICD-10-CM

## 2024-09-27 DIAGNOSIS — R79.89 TROPONIN LEVEL ELEVATED: ICD-10-CM

## 2024-09-27 DIAGNOSIS — I25.10 CORONARY ARTERY DISEASE INVOLVING NATIVE HEART WITHOUT ANGINA PECTORIS, UNSPECIFIED VESSEL OR LESION TYPE: ICD-10-CM

## 2024-09-27 DIAGNOSIS — N18.6 TYPE 2 DIABETES MELLITUS WITH CHRONIC KIDNEY DISEASE ON CHRONIC DIALYSIS, WITH LONG-TERM CURRENT USE OF INSULIN (HCC): ICD-10-CM

## 2024-09-27 DIAGNOSIS — Z79.4 CURRENT USE OF INSULIN (HCC): ICD-10-CM

## 2024-09-27 DIAGNOSIS — Z95.5 STENTED CORONARY ARTERY: Primary | ICD-10-CM

## 2024-09-27 DIAGNOSIS — F41.9 INSOMNIA SECONDARY TO ANXIETY: ICD-10-CM

## 2024-09-27 DIAGNOSIS — I25.118 CORONARY ARTERY DISEASE WITH STABLE ANGINA PECTORIS (HCC): ICD-10-CM

## 2024-09-27 DIAGNOSIS — F33.2 SEVERE EPISODE OF RECURRENT MAJOR DEPRESSIVE DISORDER, WITHOUT PSYCHOTIC FEATURES (HCC): ICD-10-CM

## 2024-09-27 DIAGNOSIS — Z99.2 TYPE 2 DIABETES MELLITUS WITH CHRONIC KIDNEY DISEASE ON CHRONIC DIALYSIS, WITH LONG-TERM CURRENT USE OF INSULIN (HCC): ICD-10-CM

## 2024-09-27 DIAGNOSIS — F51.05 INSOMNIA SECONDARY TO ANXIETY: ICD-10-CM

## 2024-09-27 DIAGNOSIS — E11.42 TYPE 2 DIABETES MELLITUS WITH DIABETIC POLYNEUROPATHY, WITH LONG-TERM CURRENT USE OF INSULIN (HCC): ICD-10-CM

## 2024-09-27 DIAGNOSIS — M10.9 GOUT, UNSPECIFIED CAUSE, UNSPECIFIED CHRONICITY, UNSPECIFIED SITE: ICD-10-CM

## 2024-09-27 DIAGNOSIS — Z79.4 TYPE 2 DIABETES MELLITUS WITH CHRONIC KIDNEY DISEASE ON CHRONIC DIALYSIS, WITH LONG-TERM CURRENT USE OF INSULIN (HCC): ICD-10-CM

## 2024-09-27 DIAGNOSIS — I10 PRIMARY HYPERTENSION: ICD-10-CM

## 2024-09-27 DIAGNOSIS — E03.9 HYPOTHYROIDISM, UNSPECIFIED TYPE: ICD-10-CM

## 2024-09-27 DIAGNOSIS — Z79.4 TYPE 2 DIABETES MELLITUS WITH DIABETIC POLYNEUROPATHY, WITH LONG-TERM CURRENT USE OF INSULIN (HCC): ICD-10-CM

## 2024-09-27 DIAGNOSIS — E11.22 TYPE 2 DIABETES MELLITUS WITH CHRONIC KIDNEY DISEASE ON CHRONIC DIALYSIS, WITH LONG-TERM CURRENT USE OF INSULIN (HCC): ICD-10-CM

## 2024-09-27 PROCEDURE — 93797 PHYS/QHP OP CAR RHAB WO ECG: CPT

## 2024-09-27 RX ORDER — LOSARTAN POTASSIUM 25 MG/1
25 TABLET ORAL DAILY
Qty: 30 TABLET | Refills: 0 | Status: SHIPPED | OUTPATIENT
Start: 2024-09-27

## 2024-09-27 RX ORDER — FUROSEMIDE 80 MG
160 TABLET ORAL DAILY
Qty: 60 TABLET | Refills: 0 | Status: SHIPPED | OUTPATIENT
Start: 2024-09-27

## 2024-09-27 RX ORDER — CLOPIDOGREL BISULFATE 75 MG/1
75 TABLET ORAL DAILY
Qty: 30 TABLET | Refills: 0 | Status: SHIPPED | OUTPATIENT
Start: 2024-09-27

## 2024-09-27 RX ORDER — INSULIN LISPRO 100 [IU]/ML
5 INJECTION, SOLUTION INTRAVENOUS; SUBCUTANEOUS
Qty: 5 ML | Refills: 0 | Status: SHIPPED | OUTPATIENT
Start: 2024-09-27

## 2024-09-27 RX ORDER — ATORVASTATIN CALCIUM 40 MG/1
40 TABLET, FILM COATED ORAL DAILY
Qty: 30 TABLET | Refills: 0 | Status: SHIPPED | OUTPATIENT
Start: 2024-09-27

## 2024-09-27 RX ORDER — OLANZAPINE 5 MG/1
5 TABLET ORAL
Qty: 30 TABLET | Refills: 0 | Status: SHIPPED | OUTPATIENT
Start: 2024-09-27

## 2024-09-27 RX ORDER — ASPIRIN 81 MG/1
81 TABLET ORAL DAILY
Qty: 30 TABLET | Refills: 0 | Status: SHIPPED | OUTPATIENT
Start: 2024-09-27

## 2024-09-27 RX ORDER — ALLOPURINOL 100 MG/1
200 TABLET ORAL DAILY
Qty: 60 TABLET | Refills: 0 | Status: SHIPPED | OUTPATIENT
Start: 2024-09-27

## 2024-09-27 RX ORDER — CITALOPRAM HYDROBROMIDE 40 MG/1
TABLET ORAL
Qty: 30 TABLET | Refills: 0 | Status: SHIPPED | OUTPATIENT
Start: 2024-09-27

## 2024-09-27 RX ORDER — LEVOTHYROXINE SODIUM 50 UG/1
50 TABLET ORAL DAILY
Qty: 30 TABLET | Refills: 0 | Status: SHIPPED | OUTPATIENT
Start: 2024-09-27

## 2024-09-27 RX ORDER — INSULIN DEGLUDEC 200 U/ML
20 INJECTION, SOLUTION SUBCUTANEOUS DAILY
Qty: 3 ML | Refills: 0 | Status: SHIPPED | OUTPATIENT
Start: 2024-09-27

## 2024-09-27 RX ORDER — METOPROLOL SUCCINATE 25 MG/1
12.5 TABLET, EXTENDED RELEASE ORAL DAILY
Qty: 15 TABLET | Refills: 0 | Status: SHIPPED | OUTPATIENT
Start: 2024-09-27 | End: 2024-10-27

## 2024-09-27 NOTE — PROGRESS NOTES
I called the patient to follow up.  She could not talk because she was completing forms at Cardiac Rehab.  She did state she is feeling well denying any chest pain. She reports slight shortness of breath with exertion and sometimes at rest. I provided ED precautions.  She notes weight is stable via dialysis.    The patient did state she lost her medications.  I encouraged her to call the pharmacy to see if they could get an override from her insurance.     I wished the patient a Happy Birthday and will follow up next week.

## 2024-09-27 NOTE — PROGRESS NOTES
I received a message on my VM from the patient stating she called the pharmacy and was told new orders need to be placed; note sent to PCP.

## 2024-09-27 NOTE — PROGRESS NOTES
I called Yenny to remind her of the patient's PCP appointment on Monday as I failed to remind the patient when I spoke to her earlier.  Yenny stated the patient will be attending the appointment.    Yenny also noted the patient's bag of meds cannot be located.  She believes they are just misplaced in the midst of unpacking.  I asked her to contact the pharmacy to see if they could provide a few days worth of meds until the bag can be found.  I also stressed the need for her to find a local pharmacy soon so she has availability to her meds nearby and she agreed.    I will continue to follow.

## 2024-09-27 NOTE — PROGRESS NOTES
CARDIAC REHABILITATION   ASSESSMENT AND INDIVIDUALIZED TREATMENT PLAN  INITIAL           Today's date: 2024   # of Exercise Sessions Completed: 1-evaluation  Patient name: Dee Dee Shaffer      : 1960  Age: 63 y.o.       MRN: [id number]  Referring Physician: Dr. Martin  Cardiologist: Dr. Martin  Provider: Mariposa  Clinician: Fabiana Dickinson MS, CEP      Treatment is tailored to this patient's individual needs.  The ITP was reviewed with the patient and all questions were answered to their satisfaction.  Additional ITP documentation can be found electronically including daily and monthly exercise summaries, daily session notes with ECG summaries, education notes, daily medication reconciliation, and daily physician supervision.      Comments: N/A        Dx:   Encounter Diagnoses   Name Primary?    Troponin level elevated     Coronary artery disease with stable angina pectoris (HCC)        Description of Diagnosis: JANET-mLCx  Date of onset: 2024  Other Cardiac History: s/p PCI x5 to unknown vessels in  and , CHF-EF 23%        ASSESSMENT    Medical History:   Past Medical History:   Diagnosis Date    Abnormal liver function     Anemia     Anxiety     Arthritis     CHF (congestive heart failure) (HCC)     Chronic kidney disease     stage 3    Chronic narcotic dependence (HCC)     Chronic pain disorder     lower back, hands , neck and knees    Coronary artery disease     Depression     Diabetes mellitus (HCC)     Elevated troponin 2022    GERD (gastroesophageal reflux disease)     no meds at present    Heart murmur     murmur    Hyperlipidemia     Hypertension     Neurogenic bladder     Open toe wound 2020    right big toe open calus but is dry at present    PONV (postoperative nausea and vomiting)     Renal disorder     Shortness of breath     exertion    Skin ulcer of hand, limited to breakdown of skin (HCC) 2021    Sleep apnea     doesn't use cpap    Suprapubic  catheter (HCC)     Urinary retention        Family History:  Family History   Problem Relation Age of Onset    Stroke Father     Heart disease Father     No Known Problems Mother     No Known Problems Sister     No Known Problems Daughter     No Known Problems Maternal Grandmother     No Known Problems Maternal Grandfather     No Known Problems Paternal Grandmother     No Known Problems Paternal Grandfather     No Known Problems Maternal Aunt     No Known Problems Maternal Aunt     No Known Problems Maternal Aunt     No Known Problems Maternal Aunt     No Known Problems Maternal Aunt     No Known Problems Maternal Aunt     No Known Problems Paternal Aunt        Allergies:   Latex and Codeine    Current Medications:   Current Outpatient Medications   Medication Sig Dispense Refill    allopurinol (ZYLOPRIM) 100 mg tablet TAKE 2 TABLETS (200 MG) BY MOUTH DAILY 180 tablet 0    aspirin (Aspirin Low Dose) 81 mg EC tablet Take 1 tablet (81 mg total) by mouth daily 90 tablet 3    atorvastatin (LIPITOR) 40 mg tablet TAKE 1 TABLET (40 MG TOTAL) BY MOUTH DAILY 90 tablet 0    Blood Glucose Monitoring Suppl (OneTouch Verio Reflect) w/Device KIT Check blood sugars once daily. Please substitute with appropriate alternative as covered by patient's insurance. Dx: E11.65 1 kit 0    calcitriol (ROCALTROL) 0.5 MCG capsule Take 1 capsule (0.5 mcg total) by mouth 3 (three) times a week 45 capsule 5    citalopram (CeleXA) 40 mg tablet TAKE 1 TABLET BY MOUTH DAILY 90 tablet 0    clopidogrel (PLAVIX) 75 mg tablet Take 1 tablet (75 mg total) by mouth daily 90 tablet 3    Continuous Blood Gluc Sensor (Dexcom G7 Sensor) Use 1 Device every 10 days 9 each 3    docusate sodium (COLACE) 100 mg capsule TAKE 1 CAPSULE (100 MG TOTAL) BY MOUTH 2 (TWO) TIMES A DAY 90 capsule 1    ergocalciferol (VITAMIN D2) 50,000 units TAKE 1 CAPSULE (50,000 UNITS TOTAL) BY MOUTH 3 (THREE) TIMES A WEEK 16 capsule 0    furosemide (LASIX) 80 mg tablet Take 2 tablets  (160 mg total) by mouth daily 180 tablet 3    glucose blood (OneTouch Verio) test strip Check blood sugars once daily. Please substitute with appropriate alternative as covered by patient's insurance. Dx: E11.65 100 each 3    insulin degludec (Tresiba FlexTouch) 200 units/mL CONCENTRATED U-200 injection pen Inject 20 Units under the skin daily      insulin lispro (HumaLOG) 100 units/mL injection pen Inject 5 Units under the skin 3 (three) times a day with meals INJECT 20 UNITS UNDER SKIN THREE TIMES A DAY BEFORE MEALS      Insulin Pen Needle (BD Pen Needle Micro U/F) 32G X 6 MM MISC Inject under the skin 3 (three) times a day 200 each 3    Insulin Pen Needle (BD Pen Needle Joanne 2nd Gen) 32G X 4 MM MISC INJECT UNDER THE SKIN 4 (FOUR) TIMES A DAY USE AS DIRECTED 100 each 1    isosorbide mononitrate (IMDUR) 30 mg 24 hr tablet TAKE 1 TABLET (30 MG TOTAL) BY MOUTH EVERY EVENING 90 tablet 0    ketoconazole (NIZORAL) 2 % cream       lactulose (CHRONULAC) 10 g/15 mL solution TAKE 30 ML (20 G TOTAL) BY MOUTH DAILY AS NEEDED (FOR CONSTIPATION) 946 mL 2    levothyroxine 50 mcg tablet TAKE 1 TABLET (50 MCG TOTAL) BY MOUTH DAILY 90 tablet 0    losartan (COZAAR) 25 mg tablet TAKE 1 TABLET (25 MG TOTAL) BY MOUTH DAILY 90 tablet 0    metoprolol succinate (TOPROL-XL) 25 mg 24 hr tablet Take 0.5 tablets (12.5 mg total) by mouth daily 15 tablet 0    midodrine (PROAMATINE) 5 mg tablet TAKE 1 TABLET (5 MG TOTAL) BY MOUTH AS NEEDED (IF SBP<100 WITH DIALYSIS) 90 tablet 2    naloxone (NARCAN) 4 mg/0.1 mL nasal spray Administer 1 spray into a nostril. If breathing does not return to normal or if breathing difficulty resumes after 2-3 minutes, give another dose in the other nostril using a new spray. 1 each 1    nitroglycerin (NITROSTAT) 0.4 mg SL tablet Place 1 tablet (0.4 mg total) under the tongue every 5 (five) minutes as needed for chest pain 25 tablet 1    nystatin (MYCOSTATIN) powder Apply topically 3 (three) times a day 30 g 3     OLANZapine (ZyPREXA) 5 mg tablet TAKE 1 TABLET (5 MG TOTAL) BY MOUTH DAILY AT BEDTIME 90 tablet 0    ondansetron (ZOFRAN) 4 mg tablet Take 1 tablet (4 mg total) by mouth every 8 (eight) hours as needed for nausea or vomiting 12 tablet 0    OneTouch Delica Lancets 33G MISC Check blood sugars once daily. Please substitute with appropriate alternative as covered by patient's insurance. Dx: E11.65 100 each 5    oxyCODONE (ROXICODONE) 10 MG TABS Take 1 tablet (10 mg total) by mouth every 8 (eight) hours as needed for severe pain 30 days supply Max Daily Amount: 30 mg 90 tablet 0    Ozempic, 2 MG/DOSE, 8 MG/3ML injection pen INJECT 0.75 ML (2 MG TOTAL) UNDER THE SKIN EVERY 7 DAYS 9 mL 0    pantoprazole (PROTONIX) 40 mg tablet TAKE 1 TABLET (40 MG TOTAL) BY MOUTH DAILY 90 tablet 0    senna-docusate sodium (Senexon-S) 8.6-50 mg per tablet TAKE 1 TABLET BY MOUTH DAILY 90 tablet 0    sevelamer carbonate (RENVELA) 800 mg tablet TAKE 2 TABLETS (1,600 MG TOTAL) BY MOUTH 3 (THREE) TIMES A DAY WITH MEALS 540 tablet 3    silver sulfadiazine (SILVADENE,SSD) 1 % cream Apply topically daily 50 g 0     Current Facility-Administered Medications   Medication Dose Route Frequency Provider Last Rate Last Admin    cyanocobalamin injection 1,000 mcg  1,000 mcg Intramuscular Q30 Days CHERELLE Franklin   1,000 mcg at 01/05/23 1651       Medication compliance: Yes   Comments: Pt reports to be compliant with medications    Physical Limitations: back, knee pain    Fall Risk: Moderate   Comments:  patient using wheelchair arriving to rehab today    Cultural needs: none      CAD Risk Factors:  Cholesterol: Yes  HTN: Yes  DM: Type 2   Obesity: Yes   Inactivity: Yes      EXERCISE ASSESSMENT:     Initial Fitness Assessment: NuStep submaximal ETT:  Resting:  BP: 118/82  HR: 66, Exercise:  BP: 142/82  HR: 78, METs:  1.9, ECG Summary: NSR, LBBB, and Test terminated at:  RPE 6      ECG INTERPRETATION:  NSR, LBBB    Current Functional  Status:  Occupation: retired  Recreation/Physical Activity: none  ADL’s:Capable of performing light ADLs only limited by Dyspnea  Weakness  [place]: No limitations  Home exercise: none  Other Comments: n/a      SMART Exercise Goals:   10% improvement in functional capacity based on max METs achieved in initial fitness assessment  reduced dyspnea with physical activity    improved DASI score by 10%  increased exercise capacity by 40% based on peak METs tolerated in cardiac rehab exercise session  maintain > 150 minutes per week of moderate intensity exercise    Patient Specific EXERCISE GOALS:       Increase strength and stamina with regular exercise  Establish a regular exercise program in CR      Functional Capacity Screening Tool:  [facility] Activity Status Index:  3.08 METs      PSYCHOSOCIAL ASSESSMENT:    Date of last Assessment:  9/27/2024  Depression screening:  PHQ-9 = 18    Interpretation:  15-19 = Moderately Severe Depression-admits to feelings of depression and thinking may be better off dead (r/t health), but does not have thoughts to harm herself  Anxiety screening:  ADI-7 = 12    Interpretation: 10-14 = Moderate anxiety    Pt self-report of depression and anxiety   Patient has a history of depression  Patient has a history of anxiety   Patient reports feelings of depression   Patient reports feelings of anxiety  Reports sufficient emotional support     Self-reported stress level:  6   Stressors:  recently moved and unpacking, health concerns  Stress Management Tools: practice Relaxation Techniques, keep a positive mindset, and spend time with family    SMART Psychosocial Goals:     Feelings in [place] Score < 3, Physical Fitness in [place] Score < 3, Social Support in [place] Score < 3, Daily Activity in [place] Score < 3, Social Activities in [place] Score < 3, Pain in [place] Score < 3, [facility] Health in [facility] Score < 3, Quality of Life in [place] Score < 3 , Change in Health in  "[facility] Score < 3 ,  Increased interest and pleasure in doing things,  reduced time feeling down, depressed or hopeless, feel less tired with more energy, Improved appetite control, reduced time feeling like a failure and having negative self thoughts, improved concentration, reduced incidence of restlessness and/or lethargy, reduced time feeling nervous or on edge, control or stop worrying, take time to relax, reduced feelings of restlessness, and reduced irritability    Patient Specific PSYCHOCOSOCIAL GOALS:    Increase strength to be able to do more activities without limitations to help improve mood        Quality of Life Screen:  (Higher score indicates disease impact on QOL)  [facility] COOP score: 33/45     Social Support:   children  Community/Social Activities: none     Psychosocial Assessment as it relates to rehabilitation:   Patient denies issues with his/her family or home life that may affect their rehabilitation efforts.       NUTRITION ASSESSMENT:    Initial Weight:  236.2 lb  Current Weight:     Height:   Ht Readings from Last 1 Encounters:   09/20/24 5' 7\" (1.702 m)       Rate Your Plate Score: 56/81    Diabetes: T2D, on Insulin   A1c: 6.5    last measured: 8/31/2024      Lipid management: Discussed diet and lipid management and Last lipid profile 4/10/2024  Chol 100    HDL 37  LDL 30    Current Dietary Habits:  Tries to follow lower salt diet, does not follow specific diet    SMART Nutrition Goals:   LDL <100, HDL >40, TRG <150, CHOL <200, fasting BG , and 2.5-5%  wt loss    Patient Specific NUTRITION GOALS:     1. Learn more about heart healthy eating   2. Manage DM 2 with diet and exercise       Drug/Alcohol Use:   No      OTHER CORE COMPONENT ASSESSMENT:    Tobacco Use:     N/A: Pt has a remote history of smoking    Anginal Symptoms:  None   NTG use: No prescription    SMART Goals:   consistent, controlled resting BP < 130/80 and medication compliance    Patient Specific CORE " COMPONENT GOALS:    Manage CHF with diet, exercise, and medications      INDIVIDUALIZED TREATMENT PLAN      EXERCISE GOALS and PLAN      Progress toward Exercise goals:   Reviewed Pt goals and determined plan of care    Exercise Intervention/plan:    education on home exercise guidelines, home exercise 30+ mins 2 days opposite CR, and Group class: Risk Factors for Heart Disease    The patient was counseled on exercise guidelines to achieve a minimum of 150 mins/wk of moderate intensity (RPE 4-6)   exercise and encouraged to add 1-2 days of exercise on opposite days of cardiac rehab as tolerated.       PHYSICIAN PRESCRIBED EXERCISE:    Current Aerobic Exercise Prescription:      Frequency: 3 days/week   Supplement with home exercise 2+ days/wk as tolerated       Minutes: 20 - 40         METS: 1.0-2.0            HR: RHR +30-40bpm   RPE: 4-6         Modalities: UBE, NuStep, and Room walking     Exercise workloads will be progressed gradually as tolerated, within limits of patient's ability, and according to the patient's   response to the exercise program.      Aerobic Exercise Prescription Plan for Progression   Frequency: 3 days/week of cardiac rehab       Supplement with home exercise 2+ days/wk as tolerated    Minutes: 40       >150 mins/wk of moderate intensity exercise   METS: 2.0   HR: RHR +30-40bpm     RPE: 4-6   Modalities: UBE, NuStep, and Room walking    Strength trainin-3 days / week  12-15 repetitions  1-2 sets per modality   Will be added following 2-3 weeks of monitored exercise sessions   Modalities: Arm Curl, Upright Rows, Front Raises, and Shoulder Shrugs    Home Exercise: none    Exercise Education: benefit of exercise for CAD risk factors, home exercise guidelines, AHA guidelines to achieve >150 mins/wk of moderate exercise, and RPE scale     Readiness to change: Contemplation:  (Acknowledging that there is a problem but not yet ready or sure of wanting to make a change)      NUTRITION GOALS  AND PLAN      Nutritional   Reviewed patient's Rate your Plate. Discussed key elements of heart healthy eating. Reviewed patient goals for dietary modifications and their clinical implications.  Reviewed most recent lipid profile.     Patient's progress toward Nutrition goals:    Reviewed Pt goals and determined plan of care      Nutrition Intervention/plan:   group class: Reading Food Labels and group Class: Heart Healthy Eating    Measurable goals were based Rate Your Plate Dietary Self-Assessment. These are the areas in which the patient could score higher on the assessment.  Goals include recommendations for a heart healthy diet based on American Heart Association.    Nutrition Education:   heart healthy eating principles  weight loss and management strategies  low sodium diet  maintaining hydration  nutrition for  lipid management  nutrition for Improved BG control  target goal for A1c <7.0  exercise and diabetes management   healthy choices while dining out  portion control    Readiness to change: Contemplation:  (Acknowledging that there is a problem but not yet ready or sure of wanting to make a change)      PSYCHOSOCIAL GOALS AND PLAN    Psychosocial Assessment as it relates to rehabilitation:   Patient denies issues with his/her family or home life that may affect their rehabilitation efforts.     Patient's progress toward Psychosocial goals:    Reviewed Pt goals and determined plan of care    Psychosocial Intervention/plan:   Class: Stress and Your Health, Class: Relaxation, PHQ-9 >5 will refer to MD, Enroll in XDx, Practice relaxation techniques, and Keep a positive mindset    Psychosocial Education: signs/sxs of depression, benefits of a positive support system, stress management techniques, benefits of enrolling in XDx, and depression and CAD    Information to utilize [employer] was provided as well as contact information for counseling through  Behavioral Health and group  psychotherapy groups available.    Readiness to change: Contemplation:  (Acknowledging that there is a problem but not yet ready or sure of wanting to make a change)      OTHER CORE COMPONENTS GOALS and PLAN      Blood Pressure will be monitored throughout the program and cardiologist will be notified of elevated trends.    Pt will be encouraged to monitor home BP if advised by cardiologist.    Tobacco Intervention/plan:   N/A:  Pt has a remote history of smoking.      Progress toward Core Component goals:   Reviewed Pt goals and determined plan of care    Other Core Components Intervention:   group class: Understanding Heart Disease, group class: Common Heart Medications, medication compliance, avoid processed foods, engage in regular exercise, eliminate salt shaker at the table, use salt substitutes, check labels for sodium content, and monitor home BP    Group and Individual Education:  understanding high blood pressure and it's relationship to CAD, components of blood pressure management, and low sodium diet and heart failure    Readiness to change: Contemplation:  (Acknowledging that there is a problem but not yet ready or sure of wanting to make a change)

## 2024-09-30 ENCOUNTER — APPOINTMENT (OUTPATIENT)
Dept: CARDIAC REHAB | Facility: HOSPITAL | Age: 64
End: 2024-09-30
Attending: INTERNAL MEDICINE
Payer: COMMERCIAL

## 2024-09-30 ENCOUNTER — TELEPHONE (OUTPATIENT)
Dept: CARDIOLOGY CLINIC | Facility: CLINIC | Age: 64
End: 2024-09-30

## 2024-09-30 NOTE — TELEPHONE ENCOUNTER
Symptoms:  -leg swelling [x] small amount of left foot swelling  -abdominal bloating, distension, pants fitting tighter []              -loss of appetite, feeling full sooner than usual []  -worsening shortness of breath/PADILLA, activity intolerance[]  -difficulty lying flat, waking up a night feeling breathless, using more pillows, sleeping in a recliner []  -palpitations []  -chest pain/pressure []  -new or worsening cough []  -unusual fatigue []    Comments:        Weight:   -weighs self daily [] NO, still needs to find scale, states they do monitor her weights at dialysis and she thinks she has been losing some weight.   -dry/target weight  243 lbs  -today's weight  9/16  lbs, 9/27 at rehab weight was 236 lbs  -understands what to do for rapid weight gain, ie 3lbs in 24 hours or 5 lbs in one week[x]     Comments:        Diet:   -consuming any high sodium foods (canned soups, lunch meats, fast food/take out, snack food, prepared foods, sport drinks) yes[]no[x]  - fluid consumed per day-      OZ  -2g (2000 mg) Sodium, 2L (2000 ml, around 60-64 oz) fluid restriction] reinforced [x] fluid managed by nephrology        Comments:        Medications:  -med list reviewed []   -missed doses or taking a different dose than prescribed?  yes[]no[x]     Comments:        Home vital signs: (if available) NA  BP ____  HR____  Pulse ox____     Recent labs: NA  BMP/CMP -   BNP, NT Pro BNP-  CBC-     Device check: NA  Arrhythmia burden ?      Optivol/Corvue crossed ?        Other possible triggers ?: NA  Recent viral symptoms/infection []  NSAID use []  Steroids []  Anemia []  COPD/hypoxia []  Not using CPAP/BiPAP []     Comments: patient does have new patient appointment with Dr Martin 10/23/24  Patient did start cardiac rehab but not sure if she can continue, it is far away and she doesn't have a ride, her son in law is looking for a job. Advised will discuss with [facility] patient outreach to see if she has any suggestions.

## 2024-10-03 ENCOUNTER — TELEPHONE (OUTPATIENT)
Dept: FAMILY MEDICINE CLINIC | Facility: CLINIC | Age: 64
End: 2024-10-03

## 2024-10-03 NOTE — TELEPHONE ENCOUNTER
PCP SIGNATURE NEEDED FOR [facility] [employer] FORM RECEIVED VIA FAX AND PLACED IN PCP FOLDER TO BE DELIVERED AT ASSIGNED TIMES.    Tracking # [id number]

## 2024-10-04 ENCOUNTER — PATIENT OUTREACH (OUTPATIENT)
Dept: FAMILY MEDICINE CLINIC | Facility: CLINIC | Age: 64
End: 2024-10-04

## 2024-10-04 NOTE — PROGRESS NOTES
"I called the patient before she leaves for Rehab.  She reports she is feeling well.  She denies chest pain or shortness of breath.  The patient notes continued \"minimal\" edema of her left leg.  She cannot recall what her weight was yesterday at dialysis.  She did not check her blood sugar as she was just awakening.    The patient states she found her bag of medications.  I noted her upcoming appointment is not with her PCP.  I will call her prior to the appointment as a reminder.  I did encourage her finding a new PCP closer to her home as well asa new Uro for her catheter exchanges; she states she and her daughter are working on it.    The 30 day HF  ends 10/6.  As stated, I will call the patient in a few week to remind her of her [place] appointment.      "

## 2024-10-08 ENCOUNTER — TELEPHONE (OUTPATIENT)
Dept: CARDIOLOGY CLINIC | Facility: CLINIC | Age: 64
End: 2024-10-08

## 2024-10-08 NOTE — TELEPHONE ENCOUNTER
Spoke with patient, states she is at dialysis right now. Doing ok. States her weight at dialysis is 101 (assuming kg) Does not weight herself at home.   States she does get some PADILLA, is the same as previous.  States he left foot edema is better at times and needs to keep it elevated.   Patient unable to get to rehab due to transportation issues.   I did remind patient of appointment with DR Martin 10/23/24. Patient unsure of that appointment, unable write down information. Called patient and left detailed message with date, time and address.     Advised patient this will be my last call, advised to call with any questions or concerns. Left message with phone #

## 2024-10-09 ENCOUNTER — PATIENT OUTREACH (OUTPATIENT)
Dept: FAMILY MEDICINE CLINIC | Facility: CLINIC | Age: 64
End: 2024-10-09

## 2024-10-09 DIAGNOSIS — F11.20 CONTINUOUS OPIOID DEPENDENCE (HCC): ICD-10-CM

## 2024-10-09 DIAGNOSIS — G89.4 CHRONIC PAIN SYNDROME: ICD-10-CM

## 2024-10-09 RX ORDER — OXYCODONE HYDROCHLORIDE 10 MG/1
10 TABLET ORAL EVERY 8 HOURS PRN
Qty: 90 TABLET | Refills: 0 | Status: CANCELLED | OUTPATIENT
Start: 2024-10-09

## 2024-10-09 NOTE — PROGRESS NOTES
I received a call from the patient requesting a med refill; submitted to PCP.    I will call her next week to remind her of her upcoming appointment.

## 2024-10-11 ENCOUNTER — PATIENT OUTREACH (OUTPATIENT)
Dept: FAMILY MEDICINE CLINIC | Facility: CLINIC | Age: 64
End: 2024-10-11

## 2024-10-11 DIAGNOSIS — G89.4 CHRONIC PAIN SYNDROME: ICD-10-CM

## 2024-10-11 DIAGNOSIS — F11.20 CONTINUOUS OPIOID DEPENDENCE (HCC): ICD-10-CM

## 2024-10-11 RX ORDER — OXYCODONE HYDROCHLORIDE 10 MG/1
10 TABLET ORAL EVERY 8 HOURS PRN
Qty: 90 TABLET | Refills: 0 | Status: SHIPPED | OUTPATIENT
Start: 2024-10-11

## 2024-10-11 NOTE — PROGRESS NOTES
I received a message on my VM from the patient asking for her med refill; note sent to PCP.          The patient called again regarding her med refill. I informed her I am waiting for the PCP to sign off on it.  Once signed, I will call the patient to inform her.          IB message received.  PCP signed off on med.  I called the patient to inform her; she was very appreciative.    I will continue to follow.

## 2024-10-14 ENCOUNTER — TELEPHONE (OUTPATIENT)
Dept: PULMONOLOGY | Facility: HOSPITAL | Age: 64
End: 2024-10-14

## 2024-10-15 ENCOUNTER — PATIENT OUTREACH (OUTPATIENT)
Dept: FAMILY MEDICINE CLINIC | Facility: CLINIC | Age: 64
End: 2024-10-15

## 2024-10-15 NOTE — PROGRESS NOTES
I called the patient to remind her of her appointment at [facility] tomorrow at 1040; she plans on attending.  I will continue to follow.

## 2024-10-16 ENCOUNTER — TELEPHONE (OUTPATIENT)
Dept: FAMILY MEDICINE CLINIC | Facility: CLINIC | Age: 64
End: 2024-10-16

## 2024-10-16 ENCOUNTER — OFFICE VISIT (OUTPATIENT)
Dept: FAMILY MEDICINE CLINIC | Facility: CLINIC | Age: 64
End: 2024-10-16

## 2024-10-16 VITALS
DIASTOLIC BLOOD PRESSURE: 60 MMHG | RESPIRATION RATE: 16 BRPM | OXYGEN SATURATION: 99 % | WEIGHT: 224.4 LBS | HEIGHT: 67 IN | TEMPERATURE: 96.7 F | BODY MASS INDEX: 35.22 KG/M2 | SYSTOLIC BLOOD PRESSURE: 120 MMHG | HEART RATE: 74 BPM

## 2024-10-16 DIAGNOSIS — L97.521 DIABETIC ULCER OF TOE OF LEFT FOOT ASSOCIATED WITH DIABETES MELLITUS DUE TO UNDERLYING CONDITION, LIMITED TO BREAKDOWN OF SKIN (HCC): ICD-10-CM

## 2024-10-16 DIAGNOSIS — Z97.8 CHRONIC INDWELLING FOLEY CATHETER: ICD-10-CM

## 2024-10-16 DIAGNOSIS — E11.9 ENCOUNTER FOR DIABETIC FOOT EXAM (HCC): Primary | ICD-10-CM

## 2024-10-16 DIAGNOSIS — Z99.2 TYPE 2 DIABETES MELLITUS WITH CHRONIC KIDNEY DISEASE ON CHRONIC DIALYSIS, WITH LONG-TERM CURRENT USE OF INSULIN (HCC): ICD-10-CM

## 2024-10-16 DIAGNOSIS — I50.43 ACUTE ON CHRONIC COMBINED SYSTOLIC AND DIASTOLIC CONGESTIVE HEART FAILURE (HCC): ICD-10-CM

## 2024-10-16 DIAGNOSIS — Z89.422 ACQUIRED ABSENCE OF OTHER LEFT TOE(S) (HCC): ICD-10-CM

## 2024-10-16 DIAGNOSIS — I47.29 NSVT (NONSUSTAINED VENTRICULAR TACHYCARDIA) (HCC): ICD-10-CM

## 2024-10-16 DIAGNOSIS — E08.621 DIABETIC ULCER OF TOE OF LEFT FOOT ASSOCIATED WITH DIABETES MELLITUS DUE TO UNDERLYING CONDITION, LIMITED TO BREAKDOWN OF SKIN (HCC): ICD-10-CM

## 2024-10-16 DIAGNOSIS — E11.22 TYPE 2 DIABETES MELLITUS WITH CHRONIC KIDNEY DISEASE ON CHRONIC DIALYSIS, WITH LONG-TERM CURRENT USE OF INSULIN (HCC): ICD-10-CM

## 2024-10-16 DIAGNOSIS — I73.89 OTHER SPECIFIED PERIPHERAL VASCULAR DISEASES (HCC): ICD-10-CM

## 2024-10-16 DIAGNOSIS — Z79.4 TYPE 2 DIABETES MELLITUS WITH CHRONIC KIDNEY DISEASE ON CHRONIC DIALYSIS, WITH LONG-TERM CURRENT USE OF INSULIN (HCC): ICD-10-CM

## 2024-10-16 DIAGNOSIS — N18.6 TYPE 2 DIABETES MELLITUS WITH CHRONIC KIDNEY DISEASE ON CHRONIC DIALYSIS, WITH LONG-TERM CURRENT USE OF INSULIN (HCC): ICD-10-CM

## 2024-10-16 PROCEDURE — 99215 OFFICE O/P EST HI 40 MIN: CPT

## 2024-10-16 PROCEDURE — G2211 COMPLEX E/M VISIT ADD ON: HCPCS

## 2024-10-16 RX ORDER — DOXYCYCLINE HYCLATE 100 MG
100 TABLET ORAL 2 TIMES DAILY
Qty: 14 TABLET | Refills: 0 | Status: SHIPPED | OUTPATIENT
Start: 2024-10-16 | End: 2024-10-23

## 2024-10-16 RX ORDER — PHENAZOPYRIDINE HYDROCHLORIDE 100 MG/1
100 TABLET, FILM COATED ORAL 3 TIMES DAILY PRN
Qty: 10 TABLET | Refills: 0 | Status: SHIPPED | OUTPATIENT
Start: 2024-10-16

## 2024-10-16 RX ORDER — ACYCLOVIR 400 MG/1
1 TABLET ORAL
Qty: 9 EACH | Refills: 3 | Status: SHIPPED | OUTPATIENT
Start: 2024-10-16

## 2024-10-16 NOTE — TELEPHONE ENCOUNTER
Patient in check out and ask for scrip for bed due to [place] one [place] and is [place] away         Please send scrip to fax # [phone]

## 2024-10-16 NOTE — PROGRESS NOTES
Dialysis through [facility], taken off of gabapentin, worsening neuropathy    Podiatry has not been following    Low of 30, when it gets 50 to 70 then symptomatic happens 1-2 a week   On ozempic    High 300     Takes 5 units even if you don't eat    Dr langford from [facility]

## 2024-10-17 ENCOUNTER — PATIENT OUTREACH (OUTPATIENT)
Dept: FAMILY MEDICINE CLINIC | Facility: CLINIC | Age: 64
End: 2024-10-17

## 2024-10-17 NOTE — PROGRESS NOTES
I received a message on my VM from the patient stating the wrong fax number was given yesterday.  She left [phone] as the number for the order for the hospital bed to be faxed to.

## 2024-10-20 NOTE — ASSESSMENT & PLAN NOTE
Patients legs/feet found to be cold to touch. Patient has long history of Diabetes, Hypertension, CAD and CKD. On physical exam patient's extremity pulses were weak.     Consider Vascular consult for wound healing in foot.

## 2024-10-20 NOTE — ASSESSMENT & PLAN NOTE
Wt Readings from Last 3 Encounters:   10/16/24 102 kg (224 lb 6.4 oz)   09/20/24 108 kg (239 lb)   09/16/24 107 kg (235 lb 9.6 oz)     Patient recently hospitalized for CAD and CHF s/p PCI in [facility]. Patient currently stable on Lipitor 40 mg, Plavix 75 mg, ASA 81mg, Lasix 80 mg BID, Cozaar 25 mg and Toprol-XL 25 mg.    Patient advised to continue compliance with medication.   Patient encouraged to continue following cardiology.

## 2024-10-20 NOTE — ASSESSMENT & PLAN NOTE
Lab Results   Component Value Date    HGBA1C 6.5 (H) 08/31/2024     Patient reports frequent episodes of symptomatic hypoglycemia. Patient reports she continues to take her meal time insulin even if she doesn't eat a meal. Patient reports lowest recorded BGL as 40's.    Patient advised to follow-up with PCP in 1 month.  Patient encouraged to fulfill orders such as dexcom and DM education.   Patient told to not take meal time insulin if she does not eat a meal.   Patient educated on diabetes and signs/symptoms of hypoglycemia such as altered mental status and death.   Patient advised to bring log or dexcom to next visit to see if meal time insulin should be decreased.     Orders:    Continuous Glucose Sensor (Dexcom G7 Sensor); Use 1 Device every 10 days    Ambulatory Referral to Diabetic Education; Future    semaglutide, 2 mg/dose, (Ozempic, 2 MG/DOSE,) 8 mg/ mL injection pen; Inject 0.75 mL (2 mg total) under the skin every 7 days

## 2024-10-20 NOTE — PROGRESS NOTES
Ambulatory Visit  Name: Dee Dee Shaffer      : 1960      MRN: [id number]  Encounter Provider: Richard Whitman MD  Encounter Date: 10/16/2024   Encounter department: [facility]    Assessment & Plan  Encounter for diabetic foot exam (HCC)  Patient's Risk Category is level 3/ Very High. Previous amputation of 2nd toe on left, active ulcer with possible necrotic tissue. Please see specifics below.     Contacted referral team directly was unable to get podiatry consult stat however was able to get wound care to assess patient at soonest availability.   Was in direct contact with referral team and making appointments.        Type 2 diabetes mellitus with chronic kidney disease on chronic dialysis, with long-term current use of insulin ([facility])    Lab Results   Component Value Date    HGBA1C 6.5 (H) 2024     Patient reports frequent episodes of symptomatic hypoglycemia. Patient reports she continues to take her meal time insulin even if she doesn't eat a meal. Patient reports lowest recorded BGL as 40's.    Patient advised to follow-up with PCP in 1 month.  Patient encouraged to fulfill orders such as dexcom and DM education.   Patient told to not take meal time insulin if she does not eat a meal.   Patient educated on diabetes and signs/symptoms of hypoglycemia such as altered mental status and death.   Patient advised to bring log or dexcom to next visit to see if meal time insulin should be decreased.     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 Patient advised to take antibiotic as prescribed. History of MRSA.     Orders:    Ambulatory Referral to Podiatry; Future    doxycycline hyclate (VIBRA-TABS) 100 mg tablet; Take 1 tablet (100 mg total) by mouth 2 (two) times a day for 7 days    Chronic indwelling Cam[place] catheter  Patient reports chronic feeling of discomfort and feels like she has to pee constantly. Patient denies any acute symptoms.     Patient advised to follow-up with urology.   Monitor sign of acute infection such as change in output, bleeding, pain, fever and chills.  Patient advised on side-effects of medications.  Patient previously had MDRO bacteriuria.   Patient given ED precautions if any red flag symptoms arise.    Orders:    phenazopyridine (Pyridium) 100 mg tablet; Take 1 tablet (100 mg total) by mouth 3 (three) times a day as needed for bladder spasms    Cranberry 125 MG TABS; Take 1 tablet (125 mg total) by mouth in the morning    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 heart failure (HCC)  Wt Readings from Last 3 Encounters:   10/16/24 102 kg (224 lb 6.4 oz)   09/20/24 108 kg (239 lb)   09/16/24 107 kg (235 lb 9.6 oz)     Patient recently hospitalized for CAD and CHF s/p PCI in [place]. Patient currently stable on Lipitor 40 mg, Plavix 75 mg, ASA 81mg, Lasix 80 mg BID, Cozaar 25 mg and Toprol-XL 25 mg.    Patient advised to continue compliance with medication.   Patient encouraged to continue following cardiology.          Acquired absence of other left toe(s) (HCC)  Previous amputation of Left 2nd toe from DM ulcer.     Patient referral to podiatry and then Wound care.        NSVT (nonsustained ventricular tachycardia) (HCC)  Patient on chart review had previous history of NSVT. Patient not currently complaining of any palpitations, chest pain or weakness. Patient reports some exercise intolerance which worsened since prior to  hospitalization.    Continue to monitor.       Other specified peripheral vascular diseases (HCC)  Patients legs/feet found to be cold to touch. Patient has long history of Diabetes, Hypertension, CAD and CKD. On physical exam patient's extremity pulses were weak.     Consider Vascular consult for wound healing in foot.             History of Present Illness     Patient most recently hospitalized in [facility] between 8/30 and 9/6 for acute exacerbation of Cornary Artery disease and Congestive heart failure as evidenced by lower limb edema/blisters. Patient received Drug eluding PCI to LCX (85% stenosed) on 9/4/24 with moderate diffuse LAD stenosis and 90% Proximal RCA stenosis. Patient reports since being discharged she has only went to 1 visit to Cardiac rehab/therapy however she says she has been doing well. Patient reported she has been complaint with her medication regimen. Patient report she has also followed up with Cardiology and Urology since.     Patient reports concern that her blood sugar has not been well controlled and frequently has episodes of hypoglycemia. Patient reports she still takes her short acting insulin when she skips meals. Patient reported she has seen her BGL as low as 40 and has needed immediate sugar intake. Patient reports symptomatic hypoglycemia happens approximately 2-3 times a week.       Review of Systems   Constitutional:  Positive for fatigue. Negative for chills and fever.   HENT:  Negative for ear pain and sore throat.    Eyes:  Negative for pain and visual disturbance.   Respiratory:  Negative for cough and shortness of breath.    Cardiovascular:  Negative for chest pain and palpitations.   Gastrointestinal:  Negative for abdominal pain, constipation, diarrhea, nausea and vomiting.   Endocrine: Positive for polydipsia and polyuria.   Genitourinary:  Positive for difficulty urinating (chronic morales), dysuria (chronic, no acute worening.) and frequency. Negative for  decreased urine volume, enuresis, flank pain, hematuria and pelvic pain.   Musculoskeletal:  Positive for arthralgias, back pain, gait problem and myalgias.   Skin:  Positive for wound (left foot). Negative for color change and rash.   Neurological:  Negative for dizziness, seizures, syncope, facial asymmetry, weakness and headaches.   Psychiatric/Behavioral:  Negative for agitation and behavioral problems.    All other systems reviewed and are negative.    Past Medical History:   Diagnosis Date    Abnormal liver function     Anemia     Anxiety     Arthritis     CHF (congestive heart failure) ([facility])     Chronic kidney disease     stage 3    Chronic narcotic dependence ([facility])     Chronic pain disorder     lower back, hands , neck and knees    Coronary artery disease     Depression     Diabetes mellitus ([facility])     Elevated troponin 02/11/2022    GERD (gastroesophageal reflux disease)     no meds at present    Heart murmur     murmur    Hyperlipidemia     Hypertension     Neurogenic bladder     Open toe wound 12/2020    right big toe open calus but is dry at present    PONV (postoperative nausea and vomiting)     Renal disorder     Shortness of breath     exertion    Skin ulcer of hand, limited to breakdown of skin ([facility]) 02/25/2021    Sleep apnea     doesn't use cpap    Suprapubic catheter ([facility])     Urinary retention      Past Surgical History:   Procedure Laterality Date    AMPUTATION Left     2nd toe    ANGIOPLASTY  2017 5    BREAST EXCISIONAL BIOPSY Left     BREAST SURGERY      CARDIAC CATHETERIZATION      CARDIAC CATHETERIZATION N/A 07/01/2022    Procedure: Cardiac catheterization;  Surgeon: Minh Franz MD;  Location: [facility] CARDIAC CATH LAB;  Service: Cardiology    CARDIAC CATHETERIZATION N/A 07/01/2022    Procedure: Cardiac pci;  Surgeon: Minh Franz MD;  Location: [facility]ARD[facility];  Service: Cardiology    CARDIAC CATHETERIZATION Left 9/4/2024    Procedure: Cardiac Left Heart Cath;  Surgeon: Dajuan Mac MD;   Location: [facility];  Service: Cardiology    CARDIAC CATHETERIZATION N/A 9/4/2024    Procedure: Cardiac Coronary Angiogram;  Surgeon: Dajuan Mac MD;  Location: [facility];  Service: Cardiology    CARDIAC CATHETERIZATION N/A 9/4/2024    Procedure: Cardiac PCI Stent;  Surgeon: Dajuan Mac MD;  Location: [facility];  Service: Cardiology    CARPAL TUNNEL RELEASE Bilateral     CERVICAL FUSION      HYSTERECTOMY  2008    IR AV FISTULAGRAM/GRAFTOGRAM  04/26/2023    IR AV FISTULAGRAM/GRAFTOGRAM  2/28/2024    IR SUPRAPUBIC CATHETER PLACEMENT  06/15/2021    IR SUPRAPUBIC CATHETER PLACEMENT  02/14/2023    IR TUNNELED CENTRAL LINE PLACEMENT  04/04/2023    IR TUNNELED DIALYSIS CATHETER PLACEMENT  07/15/2022    IR TUNNELED DIALYSIS CATHETER PLACEMENT  12/12/2022    IR TUNNELED DIALYSIS CATHETER REMOVAL  8/29/2023    KNEE SURGERY      OOPHORECTOMY  2008    MD ARTERIOVENOUS ANASTOMOSIS OPEN DIRECT Left 02/06/2023    Procedure: CREATION FISTULA  ARTERIOVENOUS (AV);  Surgeon: Minna Herbert DO;  Location: [facility];  Service: Vascular    MD EXC B9 LESION MRGN XCP SK TG S/N/H/F/G 3.1-4.0CM N/A 12/21/2020    Procedure: EXCISION SEBACEOUS CYST X 5 SCALP;  Surgeon: Minh Benton MD;  Location:  MAIN OR;  Service: General    MD REVJ OPN ARVEN FSTL W/O THRMBC DIAL GRF Left 7/3/2023    Procedure: REVISION AV FISTULA;  Surgeon: Minna Herbert DO;  Location: [facility];  Service: Vascular    TOE AMPUTATION Left     TRIGGER FINGER RELEASE Right     4th Finger     Family History   Problem Relation Age of Onset    Stroke Father     Heart disease Father     No Known Problems Mother     No Known Problems Sister     No Known Problems Daughter     No Known Problems Maternal Grandmother     No Known Problems Maternal Grandfather     No Known Problems Paternal Grandmother     No Known Problems Paternal Grandfather     No Known Problems Maternal Aunt     No Known Problems Maternal Aunt     No Known Problems  Maternal Aunt     No Known Problems Maternal Aunt     No Known Problems Maternal Aunt     No Known Problems Maternal Aunt     No Known Problems Paternal Aunt      Social History     Tobacco Use    Smoking status: Former     Current packs/day: 0.00     Average packs/day: 1 pack/day for 35.0 years (35.0 ttl pk-yrs)     Types: Cigarettes     Start date:      Quit date:      Years since quittin.8     Passive exposure: Past    Smokeless tobacco: Never   Vaping Use    Vaping status: Never Used   Substance and Sexual Activity    Alcohol use: Not Currently    Drug use: Never    Sexual activity: Not Currently     Current Outpatient Medications on File Prior to Visit   Medication Sig    allopurinol (ZYLOPRIM) 100 mg tablet Take 2 tablets (200 mg total) by mouth daily    aspirin (Aspirin Low Dose) 81 mg EC tablet Take 1 tablet (81 mg total) by mouth daily    atorvastatin (LIPITOR) 40 mg tablet Take 1 tablet (40 mg total) by mouth daily    Blood Glucose Monitoring Suppl (OneTouch Verio Reflect) w/Device KIT Check blood sugars once daily. Please substitute with appropriate alternative as covered by patient's insurance. Dx: E11.65    calcitriol (ROCALTROL) 0.5 MCG capsule Take 1 capsule (0.5 mcg total) by mouth 3 (three) times a week    citalopram (CeleXA) 40 mg tablet TAKE 1 TABLET BY MOUTH DAILY    clopidogrel (PLAVIX) 75 mg tablet Take 1 tablet (75 mg total) by mouth daily    Continuous Blood Gluc Sensor (Dexcom G7 Sensor) Use 1 Device every 10 days    docusate sodium (COLACE) 100 mg capsule TAKE 1 CAPSULE (100 MG TOTAL) BY MOUTH 2 (TWO) TIMES A DAY    ergocalciferol (VITAMIN D2) 50,000 units TAKE 1 CAPSULE (50,000 UNITS TOTAL) BY MOUTH 3 (THREE) TIMES A WEEK    furosemide (LASIX) 80 mg tablet Take 2 tablets (160 mg total) by mouth daily    glucose blood (OneTouch Verio) test strip Check blood sugars once daily. Please substitute with appropriate alternative as covered by patient's insurance. Dx: E11.65    insulin  degludec (Tresiba FlexTouch) 200 units/mL CONCENTRATED U-200 injection pen Inject 20 Units under the skin daily    insulin lispro (HumaLOG) 100 units/mL injection pen Inject 5 Units under the skin 3 (three) times a day with meals INJECT 20 UNITS UNDER SKIN THREE TIMES A DAY BEFORE MEALS    Insulin Pen Needle (BD Pen Needle Micro U/F) 32G X 6 MM MISC Inject under the skin 3 (three) times a day    Insulin Pen Needle (BD Pen Needle Joanne 2nd Gen) 32G X 4 MM MISC INJECT UNDER THE SKIN 4 (FOUR) TIMES A DAY USE AS DIRECTED    isosorbide mononitrate (IMDUR) 30 mg 24 hr tablet TAKE 1 TABLET (30 MG TOTAL) BY MOUTH EVERY EVENING    ketoconazole (NIZORAL) 2 % cream     lactulose (CHRONULAC) 10 g/15 mL solution TAKE 30 ML (20 G TOTAL) BY MOUTH DAILY AS NEEDED (FOR CONSTIPATION)    levothyroxine 50 mcg tablet Take 1 tablet (50 mcg total) by mouth daily    losartan (COZAAR) 25 mg tablet Take 1 tablet (25 mg total) by mouth daily    metoprolol succinate (TOPROL-XL) 25 mg 24 hr tablet Take 0.5 tablets (12.5 mg total) by mouth daily    midodrine (PROAMATINE) 5 mg tablet TAKE 1 TABLET (5 MG TOTAL) BY MOUTH AS NEEDED (IF SBP<100 WITH DIALYSIS)    naloxone (NARCAN) 4 mg/0.1 mL nasal spray Administer 1 spray into a nostril. If breathing does not return to normal or if breathing difficulty resumes after 2-3 minutes, give another dose in the other nostril using a new spray.    nitroglycerin (NITROSTAT) 0.4 mg SL tablet Place 1 tablet (0.4 mg total) under the tongue every 5 (five) minutes as needed for chest pain    nystatin (MYCOSTATIN) powder Apply topically 3 (three) times a day    OLANZapine (ZyPREXA) 5 mg tablet Take 1 tablet (5 mg total) by mouth daily at bedtime    ondansetron (ZOFRAN) 4 mg tablet Take 1 tablet (4 mg total) by mouth every 8 (eight) hours as needed for nausea or vomiting    OneTouch Delica Lancets 33G MISC Check blood sugars once daily. Please substitute with appropriate alternative as covered by patient's insurance.  "Dx: E11.65    oxyCODONE (ROXICODONE) 10 MG TABS Take 1 tablet (10 mg total) by mouth every 8 (eight) hours as needed for severe pain 30 days supply Max Daily Amount: 30 mg    pantoprazole (PROTONIX) 40 mg tablet TAKE 1 TABLET (40 MG TOTAL) BY MOUTH DAILY    senna-docusate sodium (Senexon-S) 8.6-50 mg per tablet TAKE 1 TABLET BY MOUTH DAILY    sevelamer carbonate (RENVELA) 800 mg tablet TAKE 2 TABLETS (1,600 MG TOTAL) BY MOUTH 3 (THREE) TIMES A DAY WITH MEALS    silver sulfadiazine (SILVADENE,SSD) 1 % cream Apply topically daily    [DISCONTINUED] oxygen gas Inhale 2 L/min continuous. Indications: Respiratory Failure    [DISCONTINUED] Torsemide 40 MG TABS Take 40 mg by mouth daily     Allergies   Allergen Reactions    Latex Itching    Codeine GI Intolerance     Immunization History   Administered Date(s) Administered    COVID-19 MODERNA VACC 0.5 ML IM 04/07/2021, 05/10/2021, 03/01/2022    COVID-19 Pfizer Vac BIVALENT Jose J-sucrose 12 Yr+ IM 11/29/2022, 01/05/2023    Hep B, adult 08/17/2022    INFLUENZA 10/01/2017, 11/11/2019, 11/10/2020, 10/13/2021    Influenza Quadrivalent Preservative Free 3 years and older IM 02/15/2013, 11/11/2019, 10/13/2021    Influenza Split High Dose Preservative Free IM 10/01/2016    Influenza, injectable, quadrivalent, preservative free 0.5 mL 01/03/2024    Influenza, recombinant, quadrivalent,injectable, preservative free 11/10/2020, 10/13/2021    Influenza, seasonal, injectable, preservative free 02/15/2013, 10/13/2021    Pneumococcal Conjugate 13-Valent 12/16/2021    Pneumococcal Polysaccharide PPV23 01/01/2012, 02/15/2013, 12/16/2021    Tuberculin Skin Test-PPD Intradermal 07/20/2022    Unknown 08/17/2022    Zoster Vaccine Recombinant 03/01/2022     Objective     /60 (BP Location: Right arm, Patient Position: Sitting, Cuff Size: Standard)   Pulse 74   Temp (!) 96.7 °F (35.9 °C) (Temporal)   Resp 16   Ht 5' 7\" (1.702 m)   Wt 102 kg (224 lb 6.4 oz)   SpO2 99%   BMI 35.15 kg/m² "     Physical Exam  Constitutional:       General: She is not in acute distress.     Appearance: She is obese. She is not ill-appearing.   HENT:      Head: Normocephalic and atraumatic.      Nose: Nose normal.   Eyes:      Conjunctiva/sclera: Conjunctivae normal.   Cardiovascular:      Rate and Rhythm: Normal rate and regular rhythm.      Pulses: Pulses are weak.           Dorsalis pedis pulses are 1+ on the right side and 1+ on the left side.        Posterior tibial pulses are 1+ on the right side and 1+ on the left side.      Heart sounds: Murmur heard.   Pulmonary:      Effort: Pulmonary effort is normal. No respiratory distress.      Breath sounds: Normal breath sounds. No wheezing.   Abdominal:      Palpations: Abdomen is soft.      Tenderness: There is no abdominal tenderness. There is no right CVA tenderness, left CVA tenderness or guarding.      Hernia: A hernia is present.   Feet:      Right foot:      Skin integrity: Callus and dry skin present. No ulcer, erythema or warmth (cold to touch).      Left foot: amputated     Skin integrity: Ulcer, erythema, callus and dry skin present. No warmth (cold to touch).   Skin:     General: Skin is warm and dry.      Capillary Refill: Capillary refill takes less than 2 seconds.      Findings: Wound present.          Neurological:      Mental Status: She is alert.   Psychiatric:         Mood and Affect: Mood normal.     Patient's shoes and socks removed.    Right Foot/Ankle   Right Foot Inspection  Skin Exam: skin intact, dry skin, callus, pre-ulcer and callus. No warmth (cold to touch), no erythema and no ulcer.     Toe Exam: right toe deformity. No swelling, no tenderness and erythema    Sensory   Vibration: absent  Proprioception: absent  Monofilament testing: absent    Vascular  Capillary refills: elevated  The right DP pulse is 1+. The right PT pulse is 1+.     Left Foot/Ankle  Left Foot Inspection  Skin Exam: dry skin, erythema, pre-ulcer, ulcer and callus. Skin  not intact and no warmth (cold to touch). Amputation: amputation left foot (Comments: previous 2nd toe amputation)    Toe Exam: erythema and left toe deformity. No swelling and no tenderness.     Sensory   Vibration: absent  Proprioception: absent  Monofilament testing: absent    Vascular  Capillary refills: elevated  The left DP pulse is 1+. The left PT pulse is 1+.     Assign Risk Category  Deformity present  Loss of protective sensation  Weak pulses  Risk: 3     Administrative Statements   I have spent a total time of 45 minutes in caring for this patient on the day of the visit/encounter including Diagnostic results, Prognosis, Risks and benefits of tx options, Instructions for management, Patient and family education, Importance of tx compliance, Risk factor reductions, Impressions, Counseling / Coordination of care, Documenting in the medical record, Reviewing / ordering tests, medicine, procedures  , Obtaining or reviewing history  , and Communicating with other healthcare professionals .

## 2024-10-20 NOTE — ASSESSMENT & PLAN NOTE
Previous amputation of Left 2nd toe from DM ulcer.     Patient referral to podiatry and then Wound care.

## 2024-10-20 NOTE — ASSESSMENT & PLAN NOTE
Patient on chart review had previous history of NSVT. Patient not currently complaining of any palpitations, chest pain or weakness. Patient reports some exercise intolerance which worsened since prior to hospitalization.    Continue to monitor.

## 2024-11-08 ENCOUNTER — RA CDI HCC (OUTPATIENT)
Dept: OTHER | Facility: HOSPITAL | Age: 64
End: 2024-11-08

## 2024-11-11 ENCOUNTER — TELEPHONE (OUTPATIENT)
Age: 64
End: 2024-11-11

## 2024-11-11 ENCOUNTER — PATIENT OUTREACH (OUTPATIENT)
Dept: FAMILY MEDICINE CLINIC | Facility: CLINIC | Age: 64
End: 2024-11-11

## 2024-11-11 DIAGNOSIS — G89.4 CHRONIC PAIN SYNDROME: ICD-10-CM

## 2024-11-11 DIAGNOSIS — F11.20 CONTINUOUS OPIOID DEPENDENCE (HCC): ICD-10-CM

## 2024-11-11 NOTE — TELEPHONE ENCOUNTER
Patient calling in to schedule SPT change with nursing staff in office. She is requesting phone call back from nursing staff in regards to this.     CB: [phone]

## 2024-11-11 NOTE — PROGRESS NOTES
I received a call from the patient asking for a med refil; submitted to PCP.    I reminded the patient of her PCP appointment on Friday, 11/15 at 1140; she plans on attending.  Yenny came on the line and asked if they should contact Uro for a catheter change. I informed them I did  encouraged them on a previous call to find a Uro nearer to home since this is an ongoing procedure and it would beneficial to see someone closer to home; they agreed.      I will continue to follow.

## 2024-11-11 NOTE — TELEPHONE ENCOUNTER
Voicemail left for the patient asking to please call the office to schedule an appointment for SPT change

## 2024-11-12 ENCOUNTER — PATIENT OUTREACH (OUTPATIENT)
Dept: FAMILY MEDICINE CLINIC | Facility: CLINIC | Age: 64
End: 2024-11-12

## 2024-11-12 DIAGNOSIS — Z79.4 TYPE 2 DIABETES MELLITUS WITH DIABETIC POLYNEUROPATHY, WITH LONG-TERM CURRENT USE OF INSULIN (HCC): ICD-10-CM

## 2024-11-12 DIAGNOSIS — E11.42 TYPE 2 DIABETES MELLITUS WITH DIABETIC POLYNEUROPATHY, WITH LONG-TERM CURRENT USE OF INSULIN (HCC): ICD-10-CM

## 2024-11-12 RX ORDER — PEN NEEDLE, DIABETIC 32 GX 1/4"
NEEDLE, DISPOSABLE MISCELLANEOUS 3 TIMES DAILY
Qty: 200 EACH | Refills: 3 | Status: SHIPPED | OUTPATIENT
Start: 2024-11-12

## 2024-11-12 NOTE — TELEPHONE ENCOUNTER
Patient scheduled 11/13/2024 for SPT change at the [facility] office.

## 2024-11-13 ENCOUNTER — PATIENT OUTREACH (OUTPATIENT)
Dept: FAMILY MEDICINE CLINIC | Facility: CLINIC | Age: 64
End: 2024-11-13

## 2024-11-13 ENCOUNTER — PROCEDURE VISIT (OUTPATIENT)
Dept: UROLOGY | Facility: CLINIC | Age: 64
End: 2024-11-13
Payer: MEDICARE

## 2024-11-13 VITALS
HEIGHT: 67 IN | RESPIRATION RATE: 20 BRPM | DIASTOLIC BLOOD PRESSURE: 70 MMHG | SYSTOLIC BLOOD PRESSURE: 140 MMHG | BODY MASS INDEX: 36.26 KG/M2 | HEART RATE: 80 BPM | OXYGEN SATURATION: 100 % | WEIGHT: 231 LBS

## 2024-11-13 DIAGNOSIS — B37.9 YEAST INFECTION: ICD-10-CM

## 2024-11-13 DIAGNOSIS — N31.9 NEUROGENIC BLADDER: Primary | ICD-10-CM

## 2024-11-13 PROCEDURE — 51705 CHANGE OF BLADDER TUBE: CPT

## 2024-11-13 RX ORDER — OXYCODONE HYDROCHLORIDE 10 MG/1
10 TABLET ORAL EVERY 8 HOURS PRN
Qty: 90 TABLET | Refills: 0 | Status: SHIPPED | OUTPATIENT
Start: 2024-11-13 | End: 2024-11-15 | Stop reason: SDUPTHER

## 2024-11-13 NOTE — PROGRESS NOTES
I called the patient to inform her PCP signed off on med refills.  The patient was relieved as she states she had a bad night last night.  I encouraged the patient to try and find a PCP closer to home.  She states she is holding off as she realizes it is difficult finding someone new.  I also asked that she try and find a closer Uro so SPC changes are so frequent and the upcoming winter weather is a factor.  The patient is scheduled for a catheter change this afternoon.  She notes there is a Uro in [place] but they did not have any openings until January.    I will continue to follow.

## 2024-11-13 NOTE — PROGRESS NOTES
"11/13/2024    Dee Dee Shaffer  1960  [id number]    Diagnosis  Chief Complaint    Neurogenic Bladder         Patient presents for SPT change managed by Dr. Tavarez    Plan  Follow up in 5 weeks for SPT change    Procedure: Suprapubic Tube Change       Cystostomy tube change     Date/Time  11/13/2024 1:30 PM     Performed by  Iveth Jerry RN   Authorized by  Minor Tavarez MD     [place] Protocol   Consent: Verbal consent obtained.  Consent given by: patient     Procedure Details   Patient tolerance: patient tolerated the procedure well with no immediate complications               Current catheter removed without difficulty after deflation of an intact balloon. Site prepped with Hibiclens, new 16F  silicone  suprapubic spt change via aseptic technique without incident, 10 ml balloon inflated with sterile water. irrigated easily for straw-yellow return, mild spasm noted. Patient tolerated well. Attached to drainage bag.     Vitals:    11/13/24 1356   BP: 140/70   BP Location: Right arm   Patient Position: Sitting   Cuff Size: Adult   Pulse: 80   Resp: 20   SpO2: 100%   Weight: 105 kg (231 lb)   Height: 5' 7\" (1.702 m)         Iveth Jerry RN   "

## 2024-11-13 NOTE — PROGRESS NOTES
I received a call from the patient stating she was at the pharmacy and was told her medication needed a PA; note sent to PCP.  The patient noted she had to pay >$40 last month and was told the cost increased since then if she had to pay OOP again.    The patient is aware of her appointment on Friday at 1140.      I received another call from the patient asking to switch pharmacies to one that is closer to home; change made in chart.      I will continue to follow.

## 2024-11-14 RX ORDER — NYSTATIN 100000 [USP'U]/G
POWDER TOPICAL 3 TIMES DAILY
Qty: 30 G | Refills: 3 | Status: SHIPPED | OUTPATIENT
Start: 2024-11-14

## 2024-11-15 ENCOUNTER — OFFICE VISIT (OUTPATIENT)
Dept: FAMILY MEDICINE CLINIC | Facility: CLINIC | Age: 64
End: 2024-11-15

## 2024-11-15 ENCOUNTER — PATIENT OUTREACH (OUTPATIENT)
Dept: FAMILY MEDICINE CLINIC | Facility: CLINIC | Age: 64
End: 2024-11-15

## 2024-11-15 VITALS
HEART RATE: 74 BPM | DIASTOLIC BLOOD PRESSURE: 60 MMHG | TEMPERATURE: 97.6 F | BODY MASS INDEX: 36.1 KG/M2 | HEIGHT: 67 IN | OXYGEN SATURATION: 98 % | SYSTOLIC BLOOD PRESSURE: 114 MMHG | WEIGHT: 230 LBS | RESPIRATION RATE: 18 BRPM

## 2024-11-15 DIAGNOSIS — Z23 NEED FOR COVID-19 VACCINE: Primary | ICD-10-CM

## 2024-11-15 DIAGNOSIS — F11.20 CONTINUOUS OPIOID DEPENDENCE (HCC): ICD-10-CM

## 2024-11-15 DIAGNOSIS — N18.6 ANEMIA DUE TO CHRONIC KIDNEY DISEASE, ON CHRONIC DIALYSIS (HCC): ICD-10-CM

## 2024-11-15 DIAGNOSIS — G89.4 CHRONIC PAIN SYNDROME: ICD-10-CM

## 2024-11-15 DIAGNOSIS — F51.05 INSOMNIA SECONDARY TO ANXIETY: ICD-10-CM

## 2024-11-15 DIAGNOSIS — N18.6 ESRD (END STAGE RENAL DISEASE) (HCC): ICD-10-CM

## 2024-11-15 DIAGNOSIS — I50.84 END STAGE HEART FAILURE (HCC): ICD-10-CM

## 2024-11-15 DIAGNOSIS — I50.43 ACUTE ON CHRONIC COMBINED SYSTOLIC AND DIASTOLIC CONGESTIVE HEART FAILURE (HCC): ICD-10-CM

## 2024-11-15 DIAGNOSIS — F41.9 INSOMNIA SECONDARY TO ANXIETY: ICD-10-CM

## 2024-11-15 DIAGNOSIS — D63.1 ANEMIA DUE TO CHRONIC KIDNEY DISEASE, ON CHRONIC DIALYSIS (HCC): ICD-10-CM

## 2024-11-15 DIAGNOSIS — E03.9 ACQUIRED HYPOTHYROIDISM: ICD-10-CM

## 2024-11-15 DIAGNOSIS — L97.521 DIABETIC ULCER OF TOE OF LEFT FOOT ASSOCIATED WITH DIABETES MELLITUS DUE TO UNDERLYING CONDITION, LIMITED TO BREAKDOWN OF SKIN (HCC): ICD-10-CM

## 2024-11-15 DIAGNOSIS — E08.621 DIABETIC ULCER OF TOE OF LEFT FOOT ASSOCIATED WITH DIABETES MELLITUS DUE TO UNDERLYING CONDITION, LIMITED TO BREAKDOWN OF SKIN (HCC): ICD-10-CM

## 2024-11-15 DIAGNOSIS — Z93.59 SUPRAPUBIC CATHETER (HCC): ICD-10-CM

## 2024-11-15 DIAGNOSIS — N18.32 STAGE 3B CHRONIC KIDNEY DISEASE (HCC): ICD-10-CM

## 2024-11-15 DIAGNOSIS — Z79.4 TYPE 2 DIABETES MELLITUS WITH DIABETIC POLYNEUROPATHY, WITH LONG-TERM CURRENT USE OF INSULIN (HCC): ICD-10-CM

## 2024-11-15 DIAGNOSIS — K59.03 DRUG-INDUCED CONSTIPATION: ICD-10-CM

## 2024-11-15 DIAGNOSIS — E11.42 TYPE 2 DIABETES MELLITUS WITH DIABETIC POLYNEUROPATHY, WITH LONG-TERM CURRENT USE OF INSULIN (HCC): ICD-10-CM

## 2024-11-15 DIAGNOSIS — Z99.2 ANEMIA DUE TO CHRONIC KIDNEY DISEASE, ON CHRONIC DIALYSIS (HCC): ICD-10-CM

## 2024-11-15 DIAGNOSIS — Z79.891 OPIOID USE AGREEMENT EXISTS: ICD-10-CM

## 2024-11-15 DIAGNOSIS — E78.2 MIXED HYPERLIPIDEMIA: ICD-10-CM

## 2024-11-15 DIAGNOSIS — I25.118 CORONARY ARTERY DISEASE WITH STABLE ANGINA PECTORIS (HCC): ICD-10-CM

## 2024-11-15 DIAGNOSIS — R79.89 TROPONIN LEVEL ELEVATED: ICD-10-CM

## 2024-11-15 DIAGNOSIS — I10 HYPERTENSION, UNSPECIFIED TYPE: ICD-10-CM

## 2024-11-15 PROBLEM — R82.71 BACTERIURIA: Status: RESOLVED | Noted: 2024-09-03 | Resolved: 2024-11-15

## 2024-11-15 PROBLEM — U07.1 COVID-19: Status: RESOLVED | Noted: 2024-09-01 | Resolved: 2024-11-15

## 2024-11-15 PROBLEM — G89.29 CHRONIC LEFT SHOULDER PAIN: Status: RESOLVED | Noted: 2022-12-31 | Resolved: 2024-11-15

## 2024-11-15 PROBLEM — E11.22 TYPE 2 DIABETES MELLITUS WITH CHRONIC KIDNEY DISEASE ON CHRONIC DIALYSIS, WITH LONG-TERM CURRENT USE OF INSULIN (HCC): Status: RESOLVED | Noted: 2020-09-02 | Resolved: 2024-11-15

## 2024-11-15 PROBLEM — M25.512 CHRONIC LEFT SHOULDER PAIN: Status: RESOLVED | Noted: 2022-12-31 | Resolved: 2024-11-15

## 2024-11-15 PROBLEM — K59.00 CONSTIPATION: Status: RESOLVED | Noted: 2024-04-04 | Resolved: 2024-11-15

## 2024-11-15 PROBLEM — D64.9 ANEMIA: Status: RESOLVED | Noted: 2020-09-09 | Resolved: 2024-11-15

## 2024-11-15 PROBLEM — R94.31 PROLONGED QT INTERVAL: Status: RESOLVED | Noted: 2021-08-24 | Resolved: 2024-11-15

## 2024-11-15 PROBLEM — M25.50 POLYARTHRALGIA: Status: RESOLVED | Noted: 2022-12-31 | Resolved: 2024-11-15

## 2024-11-15 PROCEDURE — 80373 DRUG SCREENING TRAMADOL: CPT | Performed by: NURSE PRACTITIONER

## 2024-11-15 PROCEDURE — 80366 DRUG SCREENING PREGABALIN: CPT | Performed by: NURSE PRACTITIONER

## 2024-11-15 PROCEDURE — 80307 DRUG TEST PRSMV CHEM ANLYZR: CPT | Performed by: NURSE PRACTITIONER

## 2024-11-15 PROCEDURE — 99214 OFFICE O/P EST MOD 30 MIN: CPT | Performed by: NURSE PRACTITIONER

## 2024-11-15 PROCEDURE — 80355 GABAPENTIN NON-BLOOD: CPT | Performed by: NURSE PRACTITIONER

## 2024-11-15 PROCEDURE — 91320 SARSCV2 VAC 30MCG TRS-SUC IM: CPT | Performed by: NURSE PRACTITIONER

## 2024-11-15 PROCEDURE — 90480 ADMN SARSCOV2 VAC 1/ONLY CMP: CPT | Performed by: NURSE PRACTITIONER

## 2024-11-15 RX ORDER — OXYCODONE HYDROCHLORIDE 10 MG/1
10 TABLET ORAL EVERY 8 HOURS PRN
Qty: 90 TABLET | Refills: 0 | Status: SHIPPED | OUTPATIENT
Start: 2024-11-15

## 2024-11-15 RX ORDER — ISOSORBIDE MONONITRATE 30 MG/1
30 TABLET, EXTENDED RELEASE ORAL EVERY EVENING
Qty: 90 TABLET | Refills: 0 | Status: SHIPPED | OUTPATIENT
Start: 2024-11-15

## 2024-11-15 RX ORDER — DOCUSATE SODIUM 100 MG/1
100 CAPSULE, LIQUID FILLED ORAL 2 TIMES DAILY
Qty: 90 CAPSULE | Refills: 1 | Status: SHIPPED | OUTPATIENT
Start: 2024-11-15

## 2024-11-15 RX ORDER — OLANZAPINE 5 MG/1
5 TABLET ORAL
Qty: 30 TABLET | Refills: 0 | Status: SHIPPED | OUTPATIENT
Start: 2024-11-15

## 2024-11-15 RX ORDER — LOSARTAN POTASSIUM 25 MG/1
25 TABLET ORAL DAILY
Qty: 30 TABLET | Refills: 0 | Status: SHIPPED | OUTPATIENT
Start: 2024-11-15

## 2024-11-15 RX ORDER — METOPROLOL SUCCINATE 25 MG/1
12.5 TABLET, EXTENDED RELEASE ORAL DAILY
Qty: 15 TABLET | Refills: 0 | Status: SHIPPED | OUTPATIENT
Start: 2024-11-15 | End: 2024-12-15

## 2024-11-15 NOTE — ASSESSMENT & PLAN NOTE
Lab Results   Component Value Date    EGFR 9 09/05/2024    EGFR 11 09/04/2024    EGFR 8 09/03/2024    CREATININE 4.76 (H) 09/05/2024    CREATININE 3.98 (H) 09/04/2024    CREATININE 5.00 (H) 09/03/2024     Lab Results   Component Value Date    WBC 9.98 09/05/2024    HGB 8.2 (L) 09/05/2024    HCT 24.7 (L) 09/05/2024     (H) 09/05/2024     09/05/2024

## 2024-11-15 NOTE — ASSESSMENT & PLAN NOTE
Lab Results   Component Value Date    CHOLESTEROL 100 04/10/2024    CHOLESTEROL 120 06/24/2022    CHOLESTEROL 152 08/03/2021     Lab Results   Component Value Date    HDL 37 (L) 04/10/2024    HDL 42 (L) 06/24/2022    HDL 30 (L) 08/03/2021     Lab Results   Component Value Date    TRIG 164 (H) 04/10/2024    TRIG 138 06/24/2022    TRIG 180 (H) 08/03/2021     Lab Results   Component Value Date    NONHDLC 63 04/10/2024    NONHDLC 122 08/03/2021    NONHDLC 154 12/18/2020     Repeat lipid panel prior to next visit   Continue with atorvastatin 40 mg daily

## 2024-11-15 NOTE — ASSESSMENT & PLAN NOTE
Lab Results   Component Value Date    EGFR 9 09/05/2024    EGFR 11 09/04/2024    EGFR 8 09/03/2024    CREATININE 4.76 (H) 09/05/2024    CREATININE 3.98 (H) 09/04/2024    CREATININE 5.00 (H) 09/03/2024       Orders:    Bed

## 2024-11-15 NOTE — ASSESSMENT & PLAN NOTE
Orders:    losartan (COZAAR) 25 mg tablet; Take 1 tablet (25 mg total) by mouth daily    isosorbide mononitrate (IMDUR) 30 mg 24 hr tablet; Take 1 tablet (30 mg total) by mouth every evening

## 2024-11-15 NOTE — PROGRESS NOTES
Name: Dee Dee Shaffer      : 1960      MRN: [id number]  Encounter Provider: CHERELLE Franklin  Encounter Date: 11/15/2024   Encounter department: [facility]  :  Assessment & Plan  Need for COVID-19 vaccine    Orders:    COVID-19 Pfizer mRNA vaccine 12 yr and older (Comirnaty pre-filled syringe)    Type 2 diabetes mellitus with diabetic polyneuropathy, with long-term current use of insulin (HCC)    Lab Results   Component Value Date    HGBA1C 6.5 (H) 2024       Orders:    Bed    Diabetic ulcer of toe of left foot associated with diabetes mellitus due to underlying condition, limited to breakdown of skin (HCC)    Lab Results   Component Value Date    HGBA1C 6.5 (H) 2024       Orders:    Bed    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 heart failure (HCC)  Wt Readings from Last 3 Encounters:   11/15/24 104 kg (230 lb)   24 105 kg (231 lb)   10/16/24 102 kg (224 lb 6.4 oz)               Orders:    Bed    Drug-induced constipation    Orders:    docusate sodium (COLACE) 100 mg capsule; Take 1 capsule (100 mg total) by mouth 2 (two) times a day    Hypertension, unspecified type    Orders:    losartan (COZAAR) 25 mg tablet; Take 1 tablet (25 mg total) by mouth daily    isosorbide mononitrate (IMDUR) 30 mg 24 hr tablet; Take 1 tablet (30 mg total) by mouth every evening    Coronary artery disease with stable angina pectoris (HCC)    Orders:    metoprolol succinate (TOPROL-XL) 25 mg 24 hr tablet; Take 0.5 tablets (12.5 mg total) by mouth daily    Troponin level elevated    Orders:    metoprolol succinate (TOPROL-XL) 25 mg 24 hr tablet; Take 0.5 tablets (12.5 mg total) by mouth daily    Insomnia secondary to anxiety    Orders:    OLANZapine (ZyPREXA) 5 mg tablet; Take 1 tablet (5 mg total) by mouth daily at bedtime    Opioid use agreement exists    Orders:    Drug Screen Routine w /Conf and Adulteration, urine.    Continuous opioid  dependence ([facility])  UDS sent today  Pain contract UTD        Chronic pain syndrome  PDMP w/no red flags  Continue oxycodone 10 mg tid prn        ESRD (end stage renal disease) ([facility])  Lab Results   Component Value Date    EGFR 9 09/05/2024    EGFR 11 09/04/2024    EGFR 8 09/03/2024    CREATININE 4.76 (H) 09/05/2024    CREATININE 3.98 (H) 09/04/2024    CREATININE 5.00 (H) 09/03/2024       T,TH, SAT Dialysis          Acquired hypothyroidism  Lab Results   Component Value Date    HIC1EBLXFZMN 1.798 08/30/2024     Continue levothyroxine 50 mcg daily   Repeat TSH          Anemia due to chronic kidney disease, on chronic dialysis ([facility])  Lab Results   Component Value Date    EGFR 9 09/05/2024    EGFR 11 09/04/2024    EGFR 8 09/03/2024    CREATININE 4.76 (H) 09/05/2024    CREATININE 3.98 (H) 09/04/2024    CREATININE 5.00 (H) 09/03/2024     Lab Results   Component Value Date    WBC 9.98 09/05/2024    HGB 8.2 (L) 09/05/2024    HCT 24.7 (L) 09/05/2024     (H) 09/05/2024     09/05/2024              Suprapubic catheter ([facility])  Managed by urology   Insertion site with erythema, no sx/sx skin infection  She has been in the clinic often for UTI, reports sx today   will send out a culture for recurring symptoms of suprapubic pain         Mixed hyperlipidemia  Lab Results   Component Value Date    CHOLESTEROL 100 04/10/2024    CHOLESTEROL 120 06/24/2022    CHOLESTEROL 152 08/03/2021     Lab Results   Component Value Date    HDL 37 (L) 04/10/2024    HDL 42 (L) 06/24/2022    HDL 30 (L) 08/03/2021     Lab Results   Component Value Date    TRIG 164 (H) 04/10/2024    TRIG 138 06/24/2022    TRIG 180 (H) 08/03/2021     Lab Results   Component Value Date    NONHDLC 63 04/10/2024    NONHDLC 122 08/03/2021    NONHDLC 154 12/18/2020     Repeat lipid panel prior to next visit   Continue with atorvastatin 40 mg daily                   History of Present Illness     Dee Dee Shaffer is a 64 y.o. female who  has a past medical history  "of Abnormal liver function, Anemia, Anxiety, Arthritis, Chronic kidney disease, Chronic narcotic dependence (HCC), Chronic pain disorder, Coronary artery disease, Depression, Diabetes mellitus (HCC), Elevated troponin, GERD (gastroesophageal reflux disease), Heart murmur, Hyperlipidemia, Hypertension, Neurogenic bladder, Open toe wound, Renal disorder, Shortness of breath, Sleep apnea, and Suprapubic catheter (HCC). who presented to the office today for follow up. She reports that she lives in [facility]. She is requesting medications to be transferred to the pharmacy there and a new hospital bed to be ordered.    The following portions of the patient's history were reviewed and updated as appropriate: allergies, current medications, past family history, past medical history, past social history, past surgical history and problem list.        Review of Systems   Constitutional:  Negative for chills and fever.   HENT:  Negative for ear pain and sore throat.    Eyes:  Negative for pain and visual disturbance.   Respiratory:  Negative for cough and shortness of breath.    Cardiovascular:  Negative for chest pain and palpitations.   Gastrointestinal:  Positive for abdominal pain. Negative for vomiting.   Genitourinary:  Positive for dysuria.   Musculoskeletal:  Positive for arthralgias, back pain, gait problem and myalgias.   Skin:  Negative for color change and rash.   Neurological:  Negative for seizures and syncope.   All other systems reviewed and are negative.         Objective   /60 (BP Location: Right arm, Patient Position: Sitting, Cuff Size: Large)   Pulse 74   Temp 97.6 °F (36.4 °C) (Temporal)   Resp 18   Ht 5' 7\" (1.702 m)   Wt 104 kg (230 lb)   SpO2 98%   BMI 36.02 kg/m²      Physical Exam  Vitals and nursing note reviewed.   Constitutional:       General: She is not in acute distress.     Appearance: She is well-developed.   HENT:      Head: Normocephalic and atraumatic.      Right Ear: External " ear normal.      Left Ear: External ear normal.   Eyes:      General:         Right eye: No discharge.         Left eye: No discharge.      Conjunctiva/sclera: Conjunctivae normal.   Cardiovascular:      Rate and Rhythm: Normal rate and regular rhythm.      Heart sounds: Murmur heard.   Pulmonary:      Effort: Pulmonary effort is normal. No respiratory distress.      Breath sounds: Normal breath sounds.   Musculoskeletal:         General: Deformity present.      Cervical back: Neck supple.      Right lower leg: Edema present.      Left lower leg: Edema present.      Comments: Deformity of third digit on both hands   Left third digit with small open area at tip      Skin:     General: Skin is warm and dry.   Neurological:      Mental Status: She is alert and oriented to person, place, and time.   Psychiatric:         Behavior: Behavior normal.

## 2024-11-15 NOTE — ASSESSMENT & PLAN NOTE
Lab Results   Component Value Date    EGFR 9 09/05/2024    EGFR 11 09/04/2024    EGFR 8 09/03/2024    CREATININE 4.76 (H) 09/05/2024    CREATININE 3.98 (H) 09/04/2024    CREATININE 5.00 (H) 09/03/2024       T,TH, SAT Dialysis

## 2024-11-15 NOTE — ASSESSMENT & PLAN NOTE
Lab Results   Component Value Date    GKX3FQYYSLQN 1.798 08/30/2024     Continue levothyroxine 50 mcg daily   Repeat TSH

## 2024-11-15 NOTE — ASSESSMENT & PLAN NOTE
Wt Readings from Last 3 Encounters:   11/15/24 104 kg (230 lb)   11/13/24 105 kg (231 lb)   10/16/24 102 kg (224 lb 6.4 oz)               Orders:    Bed

## 2024-11-15 NOTE — ASSESSMENT & PLAN NOTE
Lab Results   Component Value Date    HGBA1C 6.5 (H) 08/31/2024       Orders:    Bed     Artificial Tears: One drop to both eyes 3-4 times daily. We recommend Systane or Refresh lubricating eye drops which can be found at any pharmacy.

## 2024-11-15 NOTE — ASSESSMENT & PLAN NOTE
Lab Results   Component Value Date    EGFR 9 09/05/2024    EGFR 11 09/04/2024    EGFR 8 09/03/2024    CREATININE 4.76 (H) 09/05/2024    CREATININE 3.98 (H) 09/04/2024    CREATININE 5.00 (H) 09/03/2024     She is continuing dialysis in [facility], next is tomorrow

## 2024-11-15 NOTE — ASSESSMENT & PLAN NOTE
Orders:    metoprolol succinate (TOPROL-XL) 25 mg 24 hr tablet; Take 0.5 tablets (12.5 mg total) by mouth daily

## 2024-11-15 NOTE — PROGRESS NOTES
I received a call from the patient.  She stated she just left her PCP appointment and wanted to be sure her meds are being sent to her pharmacy in [place]. I reassured her I changed her pharmacy and the meds ordered today were sent to [facility].  The patient wants to confirm her Oxy order, which needed a PA, is also sent to [place]; note sent to PCP.

## 2024-11-18 ENCOUNTER — TELEPHONE (OUTPATIENT)
Dept: FAMILY MEDICINE CLINIC | Facility: CLINIC | Age: 64
End: 2024-11-18

## 2024-11-18 NOTE — TELEPHONE ENCOUNTER
Prior authorization for Oxycodone    FAXED ON 11/18/24 TO [facility] at [phone]. FAX CONFIRMATION RECEIVED.    Scanned into pt chart.

## 2024-11-18 NOTE — TELEPHONE ENCOUNTER
[email or web address] (Electric [employer] )    FAXED ON 11/18/24 TO [facility]  at [phone]. FAX CONFIRMATION RECEIVED.    Scanned into pt chart.

## 2024-11-20 LAB
6MAM UR QL SCN: NEGATIVE NG/ML
ACCEPTABLE CREAT UR QL: 66 MG/DL
AMPHET UR QL SCN: NEGATIVE NG/ML
ANTICONVULSANTS: ABNORMAL
BARBITURATES UR QL SCN: NEGATIVE NG/ML
BENZODIAZ UR QL SCN: NEGATIVE NG/ML
BUPRENORPHINE UR QL CFM: NEGATIVE NG/ML
CANNABINOIDS UR QL SCN: NEGATIVE NG/ML
CARISOPRODOL UR QL: NEGATIVE NG/ML
COCAINE+BZE UR QL SCN: NEGATIVE NG/ML
ETHYL GLUCURONIDE UR QL SCN: NEGATIVE NG/ML
FENTANYL UR QL SCN: NEGATIVE NG/ML
GABAPENTIN SERPLBLD QL SCN: ABNORMAL UG/ML
GABAPENTIN UR QL CFM: PRESENT
METHADONE UR QL SCN: NEGATIVE NG/ML
N-NORTRAMADOL/CREAT UR CFM: 465 NG/MG CREAT
NITRITE UR QL STRIP: NEGATIVE UG/ML
O-NORTRAMADOL UR QL: 398 NG/MG CREAT
OPIATES UR QL SCN: NEGATIVE NG/ML
OXYCODONE+OXYMORPHONE UR QL SCN: NEGATIVE NG/ML
PCP UR QL SCN: NEGATIVE NG/ML
PREGABALIN UR QL CFM: NOT DETECTED
PROPOXYPH UR QL SCN: NEGATIVE NG/ML
SPECIMEN PH ACCEPTABLE UR: 9.5 (ref 4.5–8.9)
TAPENTADOL UR QL SCN: NEGATIVE NG/ML
TRAMADOL & MTBS: ABNORMAL
TRAMADOL UR QL CFM: 1997 NG/MG CREAT
TRAMADOL UR QL SCN: ABNORMAL NG/ML

## 2024-11-21 ENCOUNTER — RESULTS FOLLOW-UP (OUTPATIENT)
Dept: FAMILY MEDICINE CLINIC | Facility: CLINIC | Age: 64
End: 2024-11-21

## 2024-12-02 DIAGNOSIS — I25.118 CORONARY ARTERY DISEASE WITH STABLE ANGINA PECTORIS (HCC): ICD-10-CM

## 2024-12-02 DIAGNOSIS — R79.89 TROPONIN LEVEL ELEVATED: ICD-10-CM

## 2024-12-02 DIAGNOSIS — I10 HYPERTENSION, UNSPECIFIED TYPE: ICD-10-CM

## 2024-12-02 RX ORDER — LOSARTAN POTASSIUM 25 MG/1
25 TABLET ORAL DAILY
Qty: 90 TABLET | Refills: 1 | Status: SHIPPED | OUTPATIENT
Start: 2024-12-02

## 2024-12-02 RX ORDER — METOPROLOL SUCCINATE 25 MG/1
12.5 TABLET, EXTENDED RELEASE ORAL DAILY
Qty: 45 TABLET | Refills: 1 | Status: SHIPPED | OUTPATIENT
Start: 2024-12-02

## 2024-12-18 ENCOUNTER — PROCEDURE VISIT (OUTPATIENT)
Dept: UROLOGY | Facility: CLINIC | Age: 64
End: 2024-12-18
Payer: MEDICARE

## 2024-12-18 VITALS
HEIGHT: 67 IN | WEIGHT: 230 LBS | DIASTOLIC BLOOD PRESSURE: 70 MMHG | BODY MASS INDEX: 36.1 KG/M2 | HEART RATE: 70 BPM | OXYGEN SATURATION: 98 % | SYSTOLIC BLOOD PRESSURE: 130 MMHG

## 2024-12-18 DIAGNOSIS — N31.9 NEUROGENIC BLADDER: Primary | ICD-10-CM

## 2024-12-18 PROCEDURE — 51710 CHANGE OF BLADDER TUBE: CPT | Performed by: UROLOGY

## 2024-12-18 RX ORDER — CIPROFLOXACIN 500 MG/1
500 TABLET, FILM COATED ORAL DAILY
COMMUNITY
Start: 2024-12-12 | End: 2024-12-19

## 2024-12-18 RX ORDER — DOXYCYCLINE 50 MG/1
50 TABLET ORAL 2 TIMES DAILY
Qty: 6 TABLET | Refills: 0 | Status: SHIPPED | OUTPATIENT
Start: 2024-12-18 | End: 2024-12-21

## 2024-12-18 RX ORDER — CARVEDILOL 25 MG/1
TABLET ORAL
COMMUNITY

## 2024-12-18 RX ORDER — DIPHENHYDRAMINE HCL 25 MG
25 CAPSULE ORAL
COMMUNITY

## 2024-12-18 RX ORDER — TORSEMIDE 20 MG/1
20 TABLET ORAL DAILY
COMMUNITY

## 2024-12-18 NOTE — PROGRESS NOTES
"12/18/2024    Dee Dee Shaffer  1960  [id number]    Diagnosis  Chief Complaint    neurogenic bladder         Patient presents for SPT exchange managed by Dr. Tavarez    Plan  Return to office in 6 weeks for SPT exchange    Procedure: Suprapubic Tube Change       Cystostomy tube change     Date/Time  12/18/2024 1:30 PM     Performed by  Kallie Bryson RN   Authorized by  Minor Tavarez MD     [place] Protocol   Consent: Verbal consent obtained.  Risks and benefits: risks, benefits and alternatives were discussed  Consent given by: patient  Patient understanding: patient states understanding of the procedure being performed  Patient identity confirmed: verbally with patient      Local anesthesia used: no     Anesthesia   Local anesthesia used: no     Sedation   Patient sedated: no       Procedure Details   Patient tolerance of procedure: patient tolerated well despite difficult removal.             Current catheter removed with some difficulty after deflation of an intact balloon. Site prepped with Hibiclens, new 16F  suprapubic spt change via aseptic technique without incident, 10 ml balloon inflated with sterile water. irrigated easily for straw-yellow return, no spasm noted. Patient tolerated well. Attached to drainage bag.     Vitals:    12/18/24 1355   BP: 130/70   BP Location: Right arm   Patient Position: Sitting   Cuff Size: Adult   Pulse: 70   SpO2: 98%   Weight: 104 kg (230 lb)   Height: 5' 7\" (1.702 m)     Nurse deflated balloon and could not remove catheter. LANRE Moctezuma helped and could not remove either. LANRE Ryder helped and was able to remove catheter. LANRE Ryder exchanged catheter and prescribed an abx just as a precaution. Advised pt of ED precautions.     Kallie Bryson RN   "

## 2024-12-20 ENCOUNTER — PATIENT OUTREACH (OUTPATIENT)
Dept: FAMILY MEDICINE CLINIC | Facility: CLINIC | Age: 64
End: 2024-12-20

## 2024-12-20 ENCOUNTER — TELEPHONE (OUTPATIENT)
Dept: FAMILY MEDICINE CLINIC | Facility: CLINIC | Age: 64
End: 2024-12-20

## 2024-12-20 NOTE — PROGRESS NOTES
I called the patient to follow up but noted she is now seeing a new provider closer to home.  She stated she had an appointment this afternoon but it needs to be rescheduled.  Of note, patient established care on 12/12 with [facility] in [place].  I informed the patient I will no longer be calling her.  She was very appreciate of the help I provided over the past few years.    Case is being closed.

## 2024-12-26 NOTE — TELEPHONE ENCOUNTER
12/26/24  2:16 PM    Thank you for your request.  This PCP item is one the locked fields and cannot be edited; it is informational. PCP is not showing in the PCP-General field. PCP removal request is not needed.    Thank you  Kingsley Burnett

## 2025-01-24 DIAGNOSIS — I10 HYPERTENSION, UNSPECIFIED TYPE: ICD-10-CM

## 2025-01-24 RX ORDER — LOSARTAN POTASSIUM 25 MG/1
25 TABLET ORAL DAILY
Qty: 90 TABLET | Refills: 2 | Status: SHIPPED | OUTPATIENT
Start: 2025-01-24

## 2025-01-29 ENCOUNTER — TELEPHONE (OUTPATIENT)
Dept: UROLOGY | Facility: CLINIC | Age: 65
End: 2025-01-29

## 2025-01-29 NOTE — TELEPHONE ENCOUNTER
Spoke with patient today in regards to her nurse appointment that she canceled for today.  Patient stated that she has transferred her care closer to her home in [place] and is now seeing [facility].  Patient expressed how she appreciated everything that the staff has done for her.

## 2025-03-02 DIAGNOSIS — I10 HYPERTENSION, UNSPECIFIED TYPE: ICD-10-CM

## 2025-03-03 ENCOUNTER — TELEPHONE (OUTPATIENT)
Dept: FAMILY MEDICINE CLINIC | Facility: CLINIC | Age: 65
End: 2025-03-03

## 2025-03-03 RX ORDER — ISOSORBIDE MONONITRATE 30 MG/1
30 TABLET, EXTENDED RELEASE ORAL EVERY EVENING
Qty: 90 TABLET | Refills: 0 | OUTPATIENT
Start: 2025-03-03

## 2025-03-05 DIAGNOSIS — Z99.2 TYPE 2 DIABETES MELLITUS WITH CHRONIC KIDNEY DISEASE ON CHRONIC DIALYSIS, WITH LONG-TERM CURRENT USE OF INSULIN (HCC): ICD-10-CM

## 2025-03-05 DIAGNOSIS — E11.22 TYPE 2 DIABETES MELLITUS WITH CHRONIC KIDNEY DISEASE ON CHRONIC DIALYSIS, WITH LONG-TERM CURRENT USE OF INSULIN (HCC): ICD-10-CM

## 2025-03-05 DIAGNOSIS — N18.6 TYPE 2 DIABETES MELLITUS WITH CHRONIC KIDNEY DISEASE ON CHRONIC DIALYSIS, WITH LONG-TERM CURRENT USE OF INSULIN (HCC): ICD-10-CM

## 2025-03-05 DIAGNOSIS — Z79.4 TYPE 2 DIABETES MELLITUS WITH CHRONIC KIDNEY DISEASE ON CHRONIC DIALYSIS, WITH LONG-TERM CURRENT USE OF INSULIN (HCC): ICD-10-CM

## 2025-03-05 RX ORDER — SEMAGLUTIDE 2.68 MG/ML
INJECTION, SOLUTION SUBCUTANEOUS
Qty: 9 ML | Refills: 0 | Status: SHIPPED | OUTPATIENT
Start: 2025-03-05

## 2025-03-14 ENCOUNTER — TELEPHONE (OUTPATIENT)
Age: 65
End: 2025-03-14

## 2025-03-14 NOTE — TELEPHONE ENCOUNTER
Patient has been added to the Medication Management and Talk Therapy wait list without a referral.    Insurance: [facility]  Insurance Type:    Commercial [x]   Medicaid []   [facility] (if applicable)   Medicare []  Location Preference: any  Provider Preference: any  Virtual: Yes [x] No []  Were outside resources sent: Yes [x] No []

## 2025-03-21 ENCOUNTER — TELEPHONE (OUTPATIENT)
Age: 65
End: 2025-03-21

## 2025-03-21 NOTE — TELEPHONE ENCOUNTER
Patient said she had her last SPT changed at [facility] about 6 weeks ago. She was admitted.

## 2025-03-24 NOTE — TELEPHONE ENCOUNTER
Pt called to ensure all of her upcoming appointments were cancelled, transferred care to a new office closer to home.

## 2025-03-24 NOTE — TELEPHONE ENCOUNTER
03/24/25  3:39 PM    Thank you for your request.  This PCP item is one the locked fields and cannot be edited; it is informational. PCP is not showing in the PCP-General field. PCP removal request is not needed.    Thank you  Kingsley Burnett

## 2025-06-01 DIAGNOSIS — Z79.4 TYPE 2 DIABETES MELLITUS WITH CHRONIC KIDNEY DISEASE ON CHRONIC DIALYSIS, WITH LONG-TERM CURRENT USE OF INSULIN (HCC): ICD-10-CM

## 2025-06-01 DIAGNOSIS — Z99.2 TYPE 2 DIABETES MELLITUS WITH CHRONIC KIDNEY DISEASE ON CHRONIC DIALYSIS, WITH LONG-TERM CURRENT USE OF INSULIN (HCC): ICD-10-CM

## 2025-06-01 DIAGNOSIS — N18.6 TYPE 2 DIABETES MELLITUS WITH CHRONIC KIDNEY DISEASE ON CHRONIC DIALYSIS, WITH LONG-TERM CURRENT USE OF INSULIN (HCC): ICD-10-CM

## 2025-06-01 DIAGNOSIS — E11.22 TYPE 2 DIABETES MELLITUS WITH CHRONIC KIDNEY DISEASE ON CHRONIC DIALYSIS, WITH LONG-TERM CURRENT USE OF INSULIN (HCC): ICD-10-CM

## 2025-06-04 RX ORDER — SEMAGLUTIDE 2.68 MG/ML
INJECTION, SOLUTION SUBCUTANEOUS
OUTPATIENT
Start: 2025-06-04

## 2025-06-04 NOTE — TELEPHONE ENCOUNTER
Good morning,    Pt has been scheduled, please address medication so telephone encounter may be closed out.

## (undated) DEVICE — FLEXIBLE ADHESIVE BANDAGE,X-LARGE: Brand: CURITY

## (undated) DEVICE — LIGACLIP MCA MULTIPLE CLIP APPLIERS, 20 MEDIUM CLIPS: Brand: LIGACLIP

## (undated) DEVICE — GLOVE SRG LF STRL BGL SKNSNS 7.5 PF

## (undated) DEVICE — SUT SILK 3-0 30 IN A304H

## (undated) DEVICE — SKIN MARKER DUAL TIP WITH RULER CAP, FLEXIBLE RULER AND LABELS: Brand: DEVON

## (undated) DEVICE — DGW .035 FC J3MM 260CM TEF: Brand: EMERALD

## (undated) DEVICE — RADIFOCUS OPTITORQUE ANGIOGRAPHIC CATHETER: Brand: OPTITORQUE

## (undated) DEVICE — CATH IVUS EAGLE EYE PLATINUM 5FR 150CM

## (undated) DEVICE — RUNTHROUGH NS EXTRA FLOPPY PTCA GUIDEWIRE: Brand: RUNTHROUGH

## (undated) DEVICE — STERILE POLYISOPRENE POWDER-FREE SURGICAL GLOVES: Brand: PROTEXIS

## (undated) DEVICE — HI-TORQUE PILOT 50 GUIDE WIRE .014 STRAIGHT TIP 3.0 CM X 190 CM: Brand: HI-TORQUE PILOT

## (undated) DEVICE — GUIDEWIRE WHOLEY HI TORQUE INTERM MOD J .035 145CM

## (undated) DEVICE — SYRINGE 10ML LL CONTROL TOP

## (undated) DEVICE — SUT PROLENE 5-0 BV-1 24 IN 9702H

## (undated) DEVICE — SURGICEL FIBRILLAR 1 X 2

## (undated) DEVICE — LIGACLIP MCA MULTIPLE CLIP APPLIERS, 20 SMALL CLIPS: Brand: LIGACLIP

## (undated) DEVICE — INTENDED FOR TISSUE SEPARATION, AND OTHER PROCEDURES THAT REQUIRE A SHARP SURGICAL BLADE TO PUNCTURE OR CUT.: Brand: BARD-PARKER SAFETY BLADES SIZE 15, STERILE

## (undated) DEVICE — SCD SEQUENTIAL COMPRESSION COMFORT SLEEVE MEDIUM KNEE LENGTH: Brand: KENDALL SCD

## (undated) DEVICE — ADHESIVE SKIN HIGH VISCOSITY EXOFIN 1ML

## (undated) DEVICE — GLOVE INDICATOR PI UNDERGLOVE SZ 8 BLUE

## (undated) DEVICE — PETRI DISH STERILE

## (undated) DEVICE — NEEDLE 25G X 1 1/2

## (undated) DEVICE — 10FR FRAZIER SUCTION HANDLE: Brand: CARDINAL HEALTH

## (undated) DEVICE — SUT ETHILON 4-0 PS-2 18 IN 1667H

## (undated) DEVICE — 40529 DERMAPROX PAD 11'' X 15'' X 1'': Brand: 40529 DERMAPROX PAD 11'' X 15'' X 1''

## (undated) DEVICE — COBAN 4 IN STERILE

## (undated) DEVICE — PLASTIC ADHESIVE BANDAGE: Brand: CURITY

## (undated) DEVICE — TR BAND RADIAL ARTERY COMPRESSION DEVICE: Brand: TR BAND

## (undated) DEVICE — LIGHT GLOVE GREEN

## (undated) DEVICE — BAG DECANTER

## (undated) DEVICE — DRAPE SHEET THREE QUARTER

## (undated) DEVICE — BALLOON NC EUPHORA 3 X 15MM

## (undated) DEVICE — TIBURON SPLIT SHEET: Brand: CONVERTORS

## (undated) DEVICE — SUT SILK 4-0 30 IN A303H

## (undated) DEVICE — SUT PROLENE 7-0 BV 175-6 24 IN 8735H

## (undated) DEVICE — GLIDESHEATH SLENDER STAINLESS STEEL KIT: Brand: GLIDESHEATH SLENDER

## (undated) DEVICE — ULTRASOUND GEL STERILE FOIL PK

## (undated) DEVICE — SUT MONOCRYL 4-0 PS-2 27 IN Y426H

## (undated) DEVICE — 3M™ STERI-STRIP™ REINFORCED ADHESIVE SKIN CLOSURES, R1547, 1/2 IN X 4 IN (12 MM X 100 MM), 6 STRIPS/ENVELOPE: Brand: 3M™ STERI-STRIP™

## (undated) DEVICE — GUIDELINER CATHETERS ARE INTENDED TO BE USED IN CONJUNCTION WITH GUIDE CATHETERS TO ACCESS DISCRETE REGIONS OF THE CORONARY AND/OR PERIPHERAL VASCULATURE, AND TO FACILITATE PLACEMENT OF INTERVENTIONAL DEVICES.: Brand: GUIDELINER® V3 CATHETER

## (undated) DEVICE — SUT PROLENE 6-0 BV130 30 IN 8709H

## (undated) DEVICE — GUIDEWIRE FFR VERRATA PLUS 185CM STR

## (undated) DEVICE — SUT VICRYL 2-0 CT-2 27 IN J269H

## (undated) DEVICE — MINOR PROCEDURE DRAPE: Brand: CONVERTORS

## (undated) DEVICE — SUT SILK 2-0 30 IN A305H

## (undated) DEVICE — NDL CNTR 20CT FM MAG: Brand: MEDLINE INDUSTRIES, INC.

## (undated) DEVICE — BALLOON EUPHORA RX 3 X 15MM

## (undated) DEVICE — CORONARY IMAGING CATHETER: Brand: OPTICROSS™ 6 HD

## (undated) DEVICE — UNDERGLOVE PROTEXIS  BLUE SZ 7

## (undated) DEVICE — SPONGE 4 X 4 XRAY 16 PLY STRL LF RFD

## (undated) DEVICE — CATH GUIDE LAUNCHER 6FR EBU 3.5

## (undated) DEVICE — IV CATH INTROCAN 18G X 1 1/4 SAFETY

## (undated) DEVICE — DRAPE SHEET X-LG

## (undated) DEVICE — PENCIL ELECTROSURG E-Z CLEAN -0035H

## (undated) DEVICE — STRL BETHLEHEM A V FISTULA PK: Brand: CARDINAL HEALTH

## (undated) DEVICE — PROXIMATE SKIN STAPLERS (35 WIDE) CONTAINS 35 STAINLESS STEEL STAPLES (FIXED HEAD): Brand: PROXIMATE

## (undated) DEVICE — SUT VICRYL 3-0 SH 27 IN J416H

## (undated) DEVICE — SUT MONOCRYL 3-0 SH 27 IN Y416H

## (undated) DEVICE — NEEDLE BLUNT 18 G X 1 1/2IN

## (undated) DEVICE — GLIDESHEATH BASIC HYDROPHILIC COATED INTRODUCER SHEATH: Brand: GLIDESHEATH

## (undated) DEVICE — TREK CORONARY DILATATION CATHETER 3.0 MM X 15 MM / RAPID-EXCHANGE: Brand: TREK

## (undated) DEVICE — SYRINGE 30ML LL

## (undated) DEVICE — BASIC PACK: Brand: CONVERTORS

## (undated) DEVICE — GLOVE SRG BIOGEL 7.5

## (undated) DEVICE — DECANTER: Brand: UNBRANDED